# Patient Record
Sex: FEMALE | Race: WHITE | NOT HISPANIC OR LATINO | Employment: OTHER | URBAN - NONMETROPOLITAN AREA
[De-identification: names, ages, dates, MRNs, and addresses within clinical notes are randomized per-mention and may not be internally consistent; named-entity substitution may affect disease eponyms.]

---

## 2017-01-06 ENCOUNTER — HOSPITAL ENCOUNTER (OUTPATIENT)
Dept: GENERAL RADIOLOGY | Facility: HOSPITAL | Age: 72
Discharge: HOME | End: 2017-01-06
Attending: INTERNAL MEDICINE

## 2017-01-06 ENCOUNTER — APPOINTMENT (OUTPATIENT)
Dept: MAMMOGRAPHY | Facility: HOSPITAL | Age: 72
End: 2017-01-06
Attending: INTERNAL MEDICINE

## 2017-01-09 ENCOUNTER — LAB (OUTPATIENT)
Dept: INTERNAL MEDICINE | Facility: CLINIC | Age: 72
End: 2017-01-09

## 2017-01-09 DIAGNOSIS — R73.9 HYPERGLYCEMIA: ICD-10-CM

## 2017-01-09 DIAGNOSIS — E55.9 VITAMIN D DEFICIENCY: ICD-10-CM

## 2017-01-09 DIAGNOSIS — I10 ESSENTIAL HYPERTENSION: ICD-10-CM

## 2017-01-09 DIAGNOSIS — R53.83 OTHER FATIGUE: ICD-10-CM

## 2017-01-09 DIAGNOSIS — E78.5 HYPERLIPIDEMIA, UNSPECIFIED HYPERLIPIDEMIA TYPE: ICD-10-CM

## 2017-01-09 LAB
25(OH)D3 SERPL-MCNC: 28.2 NG/ML
ALBUMIN SERPL-MCNC: 4.3 G/DL (ref 3.2–4.8)
ALBUMIN/GLOB SERPL: 1.7 G/DL (ref 1.5–2.5)
ALP SERPL-CCNC: 78 U/L (ref 25–100)
ALT SERPL W P-5'-P-CCNC: 15 U/L (ref 7–40)
ANION GAP SERPL CALCULATED.3IONS-SCNC: 8 MMOL/L (ref 3–11)
ARTICHOKE IGE QN: 90 MG/DL (ref 0–130)
AST SERPL-CCNC: 23 U/L (ref 0–33)
BACTERIA UR QL AUTO: ABNORMAL /HPF
BASOPHILS # BLD AUTO: 0.04 10*3/MM3 (ref 0–0.2)
BASOPHILS NFR BLD AUTO: 0.5 % (ref 0–1)
BILIRUB SERPL-MCNC: 0.2 MG/DL (ref 0.3–1.2)
BILIRUB UR QL STRIP: NEGATIVE
BUN BLD-MCNC: 22 MG/DL (ref 9–23)
BUN/CREAT SERPL: 31.4 (ref 7–25)
CALCIUM SPEC-SCNC: 9.4 MG/DL (ref 8.7–10.4)
CHLORIDE SERPL-SCNC: 107 MMOL/L (ref 99–109)
CHOLEST SERPL-MCNC: 177 MG/DL (ref 0–200)
CK SERPL-CCNC: 82 U/L (ref 26–174)
CLARITY UR: CLEAR
CO2 SERPL-SCNC: 31 MMOL/L (ref 20–31)
COLOR UR: YELLOW
CREAT BLD-MCNC: 0.7 MG/DL (ref 0.6–1.3)
DEPRECATED RDW RBC AUTO: 49.4 FL (ref 37–54)
EOSINOPHIL # BLD AUTO: 0.16 10*3/MM3 (ref 0.1–0.3)
EOSINOPHIL NFR BLD AUTO: 2 % (ref 0–3)
ERYTHROCYTE [DISTWIDTH] IN BLOOD BY AUTOMATED COUNT: 14.6 % (ref 11.3–14.5)
GFR SERPL CREATININE-BSD FRML MDRD: 83 ML/MIN/1.73
GLOBULIN UR ELPH-MCNC: 2.5 GM/DL
GLUCOSE BLD-MCNC: 101 MG/DL (ref 70–100)
GLUCOSE UR STRIP-MCNC: NEGATIVE MG/DL
HBA1C MFR BLD: 6.2 % (ref 4.8–5.6)
HCT VFR BLD AUTO: 49 % (ref 34.5–44)
HDLC SERPL-MCNC: 66 MG/DL (ref 40–60)
HGB BLD-MCNC: 15.6 G/DL (ref 11.5–15.5)
HGB UR QL STRIP.AUTO: ABNORMAL
HYALINE CASTS UR QL AUTO: ABNORMAL /LPF
IMM GRANULOCYTES # BLD: 0.02 10*3/MM3 (ref 0–0.03)
IMM GRANULOCYTES NFR BLD: 0.3 % (ref 0–0.6)
KETONES UR QL STRIP: NEGATIVE
LEUKOCYTE ESTERASE UR QL STRIP.AUTO: NEGATIVE
LYMPHOCYTES # BLD AUTO: 3.28 10*3/MM3 (ref 0.6–4.8)
LYMPHOCYTES NFR BLD AUTO: 41.3 % (ref 24–44)
MCH RBC QN AUTO: 29.5 PG (ref 27–31)
MCHC RBC AUTO-ENTMCNC: 31.8 G/DL (ref 32–36)
MCV RBC AUTO: 92.6 FL (ref 80–99)
MONOCYTES # BLD AUTO: 0.42 10*3/MM3 (ref 0–1)
MONOCYTES NFR BLD AUTO: 5.3 % (ref 0–12)
NEUTROPHILS # BLD AUTO: 4.02 10*3/MM3 (ref 1.5–8.3)
NEUTROPHILS NFR BLD AUTO: 50.6 % (ref 41–71)
NITRITE UR QL STRIP: NEGATIVE
PH UR STRIP.AUTO: 6 [PH] (ref 5–8)
PLATELET # BLD AUTO: 256 10*3/MM3 (ref 150–450)
PMV BLD AUTO: 11.2 FL (ref 6–12)
POTASSIUM BLD-SCNC: 4.1 MMOL/L (ref 3.5–5.5)
PROT SERPL-MCNC: 6.8 G/DL (ref 5.7–8.2)
PROT UR QL STRIP: ABNORMAL
RBC # BLD AUTO: 5.29 10*6/MM3 (ref 3.89–5.14)
RBC # UR: ABNORMAL /HPF
REF LAB TEST METHOD: ABNORMAL
SODIUM BLD-SCNC: 146 MMOL/L (ref 132–146)
SP GR UR STRIP: 1.02 (ref 1–1.03)
SQUAMOUS #/AREA URNS HPF: ABNORMAL /HPF
TRIGL SERPL-MCNC: 74 MG/DL (ref 0–150)
TSH SERPL DL<=0.05 MIU/L-ACNC: 2.7 MIU/ML (ref 0.35–5.35)
UROBILINOGEN UR QL STRIP: ABNORMAL
WBC NRBC COR # BLD: 7.94 10*3/MM3 (ref 3.5–10.8)
WBC UR QL AUTO: ABNORMAL /HPF

## 2017-01-09 PROCEDURE — 85025 COMPLETE CBC W/AUTO DIFF WBC: CPT | Performed by: INTERNAL MEDICINE

## 2017-01-09 PROCEDURE — 36415 COLL VENOUS BLD VENIPUNCTURE: CPT | Performed by: INTERNAL MEDICINE

## 2017-01-09 PROCEDURE — 82550 ASSAY OF CK (CPK): CPT | Performed by: INTERNAL MEDICINE

## 2017-01-09 PROCEDURE — 80061 LIPID PANEL: CPT | Performed by: INTERNAL MEDICINE

## 2017-01-09 PROCEDURE — 83036 HEMOGLOBIN GLYCOSYLATED A1C: CPT | Performed by: INTERNAL MEDICINE

## 2017-01-09 PROCEDURE — 82306 VITAMIN D 25 HYDROXY: CPT | Performed by: INTERNAL MEDICINE

## 2017-01-09 PROCEDURE — 81001 URINALYSIS AUTO W/SCOPE: CPT | Performed by: INTERNAL MEDICINE

## 2017-01-09 PROCEDURE — 84443 ASSAY THYROID STIM HORMONE: CPT | Performed by: INTERNAL MEDICINE

## 2017-01-09 PROCEDURE — 80053 COMPREHEN METABOLIC PANEL: CPT | Performed by: INTERNAL MEDICINE

## 2017-01-09 RX ORDER — NITROFURANTOIN MACROCRYSTALS 100 MG/1
100 CAPSULE ORAL 2 TIMES DAILY
Qty: 14 CAPSULE | Refills: 0 | Status: SHIPPED | OUTPATIENT
Start: 2017-01-09 | End: 2017-05-15

## 2017-01-16 RX ORDER — CLONAZEPAM 1 MG/1
TABLET ORAL
Qty: 30 TABLET | Refills: 1 | Status: SHIPPED | OUTPATIENT
Start: 2017-01-16 | End: 2017-03-11 | Stop reason: SDUPTHER

## 2017-01-16 RX ORDER — TRAZODONE HYDROCHLORIDE 50 MG/1
TABLET ORAL
Qty: 60 TABLET | Refills: 2 | Status: SHIPPED | OUTPATIENT
Start: 2017-01-16 | End: 2017-04-08 | Stop reason: SDUPTHER

## 2017-01-17 ENCOUNTER — OFFICE VISIT (OUTPATIENT)
Dept: INTERNAL MEDICINE | Facility: CLINIC | Age: 72
End: 2017-01-17

## 2017-01-17 VITALS
RESPIRATION RATE: 14 BRPM | BODY MASS INDEX: 20.83 KG/M2 | SYSTOLIC BLOOD PRESSURE: 100 MMHG | TEMPERATURE: 98.3 F | WEIGHT: 122 LBS | DIASTOLIC BLOOD PRESSURE: 62 MMHG | HEART RATE: 86 BPM | OXYGEN SATURATION: 96 % | HEIGHT: 64 IN

## 2017-01-17 DIAGNOSIS — N39.0 URINARY TRACT INFECTION WITH HEMATURIA, SITE UNSPECIFIED: ICD-10-CM

## 2017-01-17 DIAGNOSIS — J43.9 PULMONARY EMPHYSEMA, UNSPECIFIED EMPHYSEMA TYPE (HCC): ICD-10-CM

## 2017-01-17 DIAGNOSIS — E55.9 VITAMIN D DEFICIENCY: ICD-10-CM

## 2017-01-17 DIAGNOSIS — F41.9 ANXIETY: ICD-10-CM

## 2017-01-17 DIAGNOSIS — Z23 NEED FOR IMMUNIZATION AGAINST INFLUENZA: ICD-10-CM

## 2017-01-17 DIAGNOSIS — R31.9 HEMATURIA: ICD-10-CM

## 2017-01-17 DIAGNOSIS — N64.59 INVERSION OF NIPPLE: ICD-10-CM

## 2017-01-17 DIAGNOSIS — I10 ESSENTIAL HYPERTENSION: ICD-10-CM

## 2017-01-17 DIAGNOSIS — R31.9 URINARY TRACT INFECTION WITH HEMATURIA, SITE UNSPECIFIED: ICD-10-CM

## 2017-01-17 DIAGNOSIS — M79.7 FIBROMYALGIA: ICD-10-CM

## 2017-01-17 DIAGNOSIS — D35.00 ADRENAL ADENOMA, UNSPECIFIED LATERALITY: ICD-10-CM

## 2017-01-17 DIAGNOSIS — E78.5 HYPERLIPIDEMIA, UNSPECIFIED HYPERLIPIDEMIA TYPE: Primary | ICD-10-CM

## 2017-01-17 DIAGNOSIS — M81.0 OSTEOPOROSIS: ICD-10-CM

## 2017-01-17 PROCEDURE — G0008 ADMIN INFLUENZA VIRUS VAC: HCPCS | Performed by: INTERNAL MEDICINE

## 2017-01-17 PROCEDURE — 90662 IIV NO PRSV INCREASED AG IM: CPT | Performed by: INTERNAL MEDICINE

## 2017-01-17 PROCEDURE — 99214 OFFICE O/P EST MOD 30 MIN: CPT | Performed by: INTERNAL MEDICINE

## 2017-01-17 NOTE — MR AVS SNAPSHOT
Gabi Wells Octavia   1/17/2017 2:30 PM   Office Visit    Provider:  Paul Quinteros MD   Department:  [facility] PRIMARY CARE   Dept Phone:  [phone]                Your Full Care Plan              Your Updated Medication List          This list is accurate as of: 1/17/17  3:24 PM.  Always use your most recent med list.                * albuterol 1.25 MG/3ML nebulizer solution   Commonly known as:  ACCUNEB       * albuterol 108 (90 BASE) MCG/ACT inhaler   Commonly known as:  PROVENTIL HFA;VENTOLIN HFA   Inhale 2 puffs every 4 (four) hours as needed for wheezing.       CLARITIN 10 MG tablet   Generic drug:  loratadine       clonazePAM 1 MG tablet   Commonly known as:  KlonoPIN   take 1 tablet by mouth at bedtime if needed       cyanocobalamin 1000 MCG/ML injection       cyclobenzaprine 10 MG tablet   Commonly known as:  FLEXERIL   Take 1 tablet by mouth 3 (Three) Times a Day As Needed for muscle spasms.       DULoxetine 60 MG capsule   Commonly known as:  CYMBALTA   take 1 capsule by mouth once daily       fluticasone 50 MCG/ACT nasal spray   Commonly known as:  FLONASE       HYDROcodone-acetaminophen 7.5-325 MG per tablet   Commonly known as:  NORCO       lovastatin 40 MG tablet   Commonly known as:  MEVACOR   take 1 tablet by mouth at bedtime       nitrofurantoin 100 MG capsule   Commonly known as:  MACRODANTIN   Take 1 capsule by mouth 2 (Two) Times a Day.       pantoprazole 40 MG EC tablet   Commonly known as:  PROTONIX       sertraline 100 MG tablet   Commonly known as:  ZOLOFT   take 1.5 tablets by mouth at bedtime       traZODone 50 MG tablet   Commonly known as:  DESYREL   take 1 to 2 tablets by mouth at bedtime if needed for insomnia       ZOSTAVAX 64781 UNT/0.65ML injection   Generic drug:  zoster vaccine live PF       * Notice:  This list has 2 medication(s) that are the same as other medications prescribed for you. Read the directions carefully, and ask your doctor  "or other care provider to review them with you.            You Were Diagnosed With        Codes Comments    Hyperlipidemia, unspecified hyperlipidemia type    -  Primary ICD-10-CM: E78.5  ICD-9-CM: 272.4     Essential hypertension     ICD-10-CM: I10  ICD-9-CM: 401.9     Pulmonary emphysema, unspecified emphysema type     ICD-10-CM: J43.9  ICD-9-CM: 492.8     Vitamin D deficiency     ICD-10-CM: E55.9  ICD-9-CM: 268.9     Adrenal adenoma, unspecified laterality     ICD-10-CM: D35.00  ICD-9-CM: 227.0     Fibromyalgia     ICD-10-CM: M79.7  ICD-9-CM: 729.1     Osteoporosis     ICD-10-CM: M81.0  ICD-9-CM: 733.00     Inversion of nipple     ICD-10-CM: N64.59  ICD-9-CM: 611.79     Anxiety     ICD-10-CM: F41.9  ICD-9-CM: 300.00     Hematuria     ICD-10-CM: R31.9  ICD-9-CM: 599.70     Urinary tract infection with hematuria, site unspecified     ICD-10-CM: N39.0, R31.9  ICD-9-CM: 599.0       Instructions     None    Patient Instructions History      MyChart Signup     Our records indicate that you have declined [employer] signup. If you would like to sign up for [employer], please email [email or web address] or call [phone] to obtain an activation code.             Other Info from Your Visit           Allergies     No Known Allergies      Reason for Visit     Results Here for follow up on labs       Vital Signs     Blood Pressure Pulse Temperature Respirations Height Weight    100/62 86 98.3 °F (36.8 °C) 14 64\" (162.6 cm) 122 lb (55.3 kg)    Oxygen Saturation Body Mass Index Smoking Status             96% 20.94 kg/m2 Former Smoker         Problems and Diagnoses Noted     Benign tumor of adrenal gland    Anxiety problem    Fibromyalgia    Blood in urine    High cholesterol or triglycerides    High blood pressure    Inverted nipple    Osteoporosis    Emphysema    Urinary tract infection with hematuria    Vitamin D deficiency      No Longer an Issue     Nodular radiologic density      "

## 2017-01-17 NOTE — PROGRESS NOTES
Subjective   Gabi Dudley is a 71 y.o. female.     Chief Complaint   Patient presents with   • Results     Here for follow up on labs        History of Present Illness   still tob.emphysema improved after medications. Anxiety stable on medication, need refill. Insomnia patient really not medication. Hyperlipidemia stable on medication. Hypertension BP normal now, no cp or soa, glucose high, urine dip abnormal.vitD low. Heme +. Fibromyalgia stable on medication.tob smoke again, c/o allergy running nose and zyrtec helps some. Hematuria need urinalysis repeat. Adrenal gland tumor no significant changes, s/p colonoscopy multiple tubular adenoma polyps removed, c/o left nipple inversed since summer, with pain and mass    Current Outpatient Prescriptions:   •  albuterol (ACCUNEB) 1.25 MG/3ML nebulizer solution, Albuterol Sulfate 1.25 MG/3ML Inhalation Nebulization Solution; Patient Sig: Albuterol Sulfate 1.25 MG/3ML Inhalation Nebulization Solution USE 1 UNIT DOSE IN NEBULIZER EVERY 4 TO 6 HOURS AS NEEDED.; 100; 3; 21-Oct-2013; Active, Disp: , Rfl:   •  albuterol (PROVENTIL HFA;VENTOLIN HFA) 108 (90 BASE) MCG/ACT inhaler, Inhale 2 puffs every 4 (four) hours as needed for wheezing., Disp: 1 inhaler, Rfl: 5  •  clonazePAM (KlonoPIN) 1 MG tablet, take 1 tablet by mouth at bedtime if needed, Disp: 30 tablet, Rfl: 1  •  cyanocobalamin 1000 MCG/ML injection, , Disp: , Rfl:   •  cyclobenzaprine (FLEXERIL) 10 MG tablet, Take 1 tablet by mouth 3 (Three) Times a Day As Needed for muscle spasms., Disp: 30 tablet, Rfl: 3  •  DULoxetine (CYMBALTA) 60 MG capsule, take 1 capsule by mouth once daily, Disp: 30 capsule, Rfl: 3  •  fluticasone (FLONASE) 50 MCG/ACT nasal spray, into each nostril daily., Disp: , Rfl:   •  HYDROcodone-acetaminophen (NORCO) 7.5-325 MG per tablet, Take  by mouth., Disp: , Rfl:   •  loratadine (CLARITIN) 10 MG tablet, Take  by mouth., Disp: , Rfl:   •  lovastatin (MEVACOR) 40 MG tablet, take 1 tablet by mouth  at bedtime, Disp: 90 tablet, Rfl: 3  •  nitrofurantoin (MACRODANTIN) 100 MG capsule, Take 1 capsule by mouth 2 (Two) Times a Day., Disp: 14 capsule, Rfl: 0  •  pantoprazole (PROTONIX) 40 MG EC tablet, Take  by mouth., Disp: , Rfl:   •  sertraline (ZOLOFT) 100 MG tablet, take 1.5 tablets by mouth at bedtime, Disp: 45 tablet, Rfl: 5  •  traZODone (DESYREL) 50 MG tablet, take 1 to 2 tablets by mouth at bedtime if needed for insomnia, Disp: 60 tablet, Rfl: 2  •  ZOSTAVAX 41056 UNT/0.65ML injection, , Disp: , Rfl:     The following portions of the patient's history were reviewed and updated as appropriate: allergies, current medications, past family history, past medical history, past social history, past surgical history and problem list.    Review of Systems   Constitutional: Negative.    HENT: Negative.    Eyes: Negative.    Respiratory: Negative.    Cardiovascular: Negative.    Gastrointestinal: Negative.    Endocrine: Negative.    Genitourinary: Negative.    Musculoskeletal: Negative.    Skin: Negative.    Allergic/Immunologic: Negative.    Neurological: Negative.    Hematological: Negative.    Psychiatric/Behavioral: Negative.        Objective   Physical Exam   Constitutional: She is oriented to person, place, and time. She appears well-nourished.   Neck: Neck supple.   Cardiovascular: Normal rate, regular rhythm and normal heart sounds.    Pulmonary/Chest: Effort normal and breath sounds normal.   Abdominal: Bowel sounds are normal.   Neurological: She is alert and oriented to person, place, and time.   Skin: Skin is warm.   Psychiatric: She has a normal mood and affect.       All tests have been reviewed.    Assessment/Plan   There are no diagnoses linked to this encounter.          tob, CT lung 2014, lung nodule, scarring and emphysema, repeat CT lung no change 1/2017  hematuria on dip do micro showed UTI abx  hyperglycemia, do A1c 6.2  vitD low continue vitD3 1000u daily  anxiety continue zoloft 150mg and  klonopin 1mg daily prn , continue cymbalta  CT scan showed liver cyst, large kidney cyst, adrenal gland adenoma, kidney stones non-obstructing. repeat showed no change 1/2017  right knee OA on xray aleve prn knee brace help  hip pain s/p steroid helped improved  Hyperlipidemia stable on medicine continue  Hypertension no more low bp echo normal  Osteoporosis continue medication, refuse dexa   heme+, Colon polyps repeat 6/2019. EGD done  gastritis continue protonix  COPD continue medication. need to Quit tobacco. tudorsa, proair, continue O2 prn  Insomnia continue trazodone 100mg refill  Fibromyalgia stable watch continue medicine and follow up with pain clinic  vacUTD  Tension HA continue flexeril and naproxen  allergy flonase continue  sinus polyp per dentist, obtain rec.   Nipple retraction  mamm and US:negative pathology  tob to quit, RBC high, counselling  follow up in 4 mo

## 2017-03-13 RX ORDER — CLONAZEPAM 1 MG/1
TABLET ORAL
Qty: 30 TABLET | Refills: 1 | Status: SHIPPED | OUTPATIENT
Start: 2017-03-13 | End: 2017-05-13 | Stop reason: SDUPTHER

## 2017-04-10 RX ORDER — SERTRALINE HYDROCHLORIDE 100 MG/1
TABLET, FILM COATED ORAL
Qty: 45 TABLET | Refills: 5 | Status: SHIPPED | OUTPATIENT
Start: 2017-04-10 | End: 2017-09-19 | Stop reason: SDUPTHER

## 2017-04-10 RX ORDER — TRAZODONE HYDROCHLORIDE 50 MG/1
TABLET ORAL
Qty: 60 TABLET | Refills: 2 | Status: SHIPPED | OUTPATIENT
Start: 2017-04-10 | End: 2017-07-06 | Stop reason: SDUPTHER

## 2017-04-10 RX ORDER — DULOXETIN HYDROCHLORIDE 60 MG/1
CAPSULE, DELAYED RELEASE ORAL
Qty: 30 CAPSULE | Refills: 3 | Status: SHIPPED | OUTPATIENT
Start: 2017-04-10 | End: 2017-08-03 | Stop reason: SDUPTHER

## 2017-05-09 ENCOUNTER — TELEPHONE (OUTPATIENT)
Dept: INTERNAL MEDICINE | Facility: CLINIC | Age: 72
End: 2017-05-09

## 2017-05-09 RX ORDER — FLUTICASONE PROPIONATE 50 MCG
1 SPRAY, SUSPENSION (ML) NASAL DAILY
Qty: 1 EACH | Refills: 11 | Status: SHIPPED | OUTPATIENT
Start: 2017-05-09 | End: 2018-02-09 | Stop reason: SDUPTHER

## 2017-05-15 ENCOUNTER — OFFICE VISIT (OUTPATIENT)
Dept: INTERNAL MEDICINE | Facility: CLINIC | Age: 72
End: 2017-05-15

## 2017-05-15 VITALS
BODY MASS INDEX: 21.84 KG/M2 | TEMPERATURE: 97 F | SYSTOLIC BLOOD PRESSURE: 130 MMHG | RESPIRATION RATE: 14 BRPM | HEART RATE: 84 BPM | DIASTOLIC BLOOD PRESSURE: 78 MMHG | OXYGEN SATURATION: 96 % | HEIGHT: 63 IN | WEIGHT: 123.25 LBS

## 2017-05-15 DIAGNOSIS — R31.9 URINARY TRACT INFECTION WITH HEMATURIA, SITE UNSPECIFIED: ICD-10-CM

## 2017-05-15 DIAGNOSIS — M79.7 FIBROMYALGIA: ICD-10-CM

## 2017-05-15 DIAGNOSIS — N39.0 URINARY TRACT INFECTION WITH HEMATURIA, SITE UNSPECIFIED: ICD-10-CM

## 2017-05-15 DIAGNOSIS — I10 ESSENTIAL HYPERTENSION: ICD-10-CM

## 2017-05-15 DIAGNOSIS — N39.0 URINARY TRACT INFECTION, SITE UNSPECIFIED: Primary | ICD-10-CM

## 2017-05-15 DIAGNOSIS — M17.9 OSTEOARTHRITIS OF KNEE, UNSPECIFIED LATERALITY, UNSPECIFIED OSTEOARTHRITIS TYPE: ICD-10-CM

## 2017-05-15 DIAGNOSIS — J43.9 PULMONARY EMPHYSEMA, UNSPECIFIED EMPHYSEMA TYPE (HCC): ICD-10-CM

## 2017-05-15 DIAGNOSIS — M81.0 OSTEOPOROSIS: ICD-10-CM

## 2017-05-15 DIAGNOSIS — E78.5 HYPERLIPIDEMIA, UNSPECIFIED HYPERLIPIDEMIA TYPE: ICD-10-CM

## 2017-05-15 DIAGNOSIS — F41.9 ANXIETY: ICD-10-CM

## 2017-05-15 DIAGNOSIS — E55.9 VITAMIN D DEFICIENCY: ICD-10-CM

## 2017-05-15 LAB
APPEARANCE UR: CLEAR
BACTERIA #/AREA URNS HPF: ABNORMAL /HPF
BILIRUB UR QL STRIP: ABNORMAL
CASTS URNS MICRO: ABNORMAL
COLOR UR: YELLOW
CRYSTALS URNS MICRO: ABNORMAL
EPI CELLS #/AREA URNS HPF: ABNORMAL /HPF
GLUCOSE UR QL: NEGATIVE
HGB UR QL STRIP: NEGATIVE
KETONES UR QL STRIP: NEGATIVE
LEUKOCYTE ESTERASE UR QL STRIP: NEGATIVE
MUCOUS THREADS URNS QL MICRO: ABNORMAL /HPF
NITRITE UR QL STRIP: NEGATIVE
PH UR STRIP: 6 [PH] (ref 5–8)
PROT UR QL STRIP: ABNORMAL
RBC #/AREA URNS HPF: ABNORMAL /HPF
SP GR UR: ABNORMAL (ref 1–1.03)
UROBILINOGEN UR STRIP-MCNC: ABNORMAL MG/DL
WBC #/AREA URNS HPF: ABNORMAL /HPF

## 2017-05-15 PROCEDURE — 99214 OFFICE O/P EST MOD 30 MIN: CPT | Performed by: INTERNAL MEDICINE

## 2017-05-15 RX ORDER — CLONAZEPAM 1 MG/1
TABLET ORAL
Qty: 30 TABLET | Refills: 1 | Status: SHIPPED | OUTPATIENT
Start: 2017-05-15 | End: 2017-07-09 | Stop reason: SDUPTHER

## 2017-06-05 ENCOUNTER — OFFICE VISIT (OUTPATIENT)
Dept: INTERNAL MEDICINE | Facility: CLINIC | Age: 72
End: 2017-06-05

## 2017-06-05 ENCOUNTER — APPOINTMENT (OUTPATIENT)
Dept: CT IMAGING | Facility: HOSPITAL | Age: 72
End: 2017-06-05

## 2017-06-05 ENCOUNTER — HOSPITAL ENCOUNTER (EMERGENCY)
Facility: HOSPITAL | Age: 72
Discharge: HOME | End: 2017-06-05
Attending: EMERGENCY MEDICINE | Admitting: EMERGENCY MEDICINE

## 2017-06-05 VITALS
HEIGHT: 64 IN | TEMPERATURE: 97.9 F | DIASTOLIC BLOOD PRESSURE: 74 MMHG | BODY MASS INDEX: 21 KG/M2 | OXYGEN SATURATION: 93 % | HEART RATE: 91 BPM | WEIGHT: 123 LBS | RESPIRATION RATE: 18 BRPM | SYSTOLIC BLOOD PRESSURE: 94 MMHG

## 2017-06-05 VITALS
SYSTOLIC BLOOD PRESSURE: 80 MMHG | DIASTOLIC BLOOD PRESSURE: 50 MMHG | RESPIRATION RATE: 18 BRPM | BODY MASS INDEX: 21.79 KG/M2 | HEIGHT: 63 IN | WEIGHT: 123 LBS | OXYGEN SATURATION: 92 % | TEMPERATURE: 98.6 F | HEART RATE: 86 BPM

## 2017-06-05 DIAGNOSIS — R10.9 ABDOMINAL PAIN, UNSPECIFIED LOCATION: Primary | ICD-10-CM

## 2017-06-05 DIAGNOSIS — N39.0 URINARY TRACT INFECTION WITHOUT HEMATURIA, SITE UNSPECIFIED: ICD-10-CM

## 2017-06-05 DIAGNOSIS — R11.2 NAUSEA AND VOMITING, INTRACTABILITY OF VOMITING NOT SPECIFIED, UNSPECIFIED VOMITING TYPE: ICD-10-CM

## 2017-06-05 DIAGNOSIS — K52.9 ENTERITIS: Primary | ICD-10-CM

## 2017-06-05 LAB
ALBUMIN SERPL-MCNC: 4.3 G/DL (ref 3.5–5)
ALBUMIN/GLOB SERPL: 1.3 G/DL (ref 1–2)
ALP SERPL-CCNC: 80 U/L (ref 38–126)
ALT SERPL W P-5'-P-CCNC: 18 U/L (ref 13–69)
ANION GAP SERPL CALCULATED.3IONS-SCNC: 14.2 MMOL/L
AST SERPL-CCNC: 26 U/L (ref 15–46)
BACTERIA UR QL AUTO: ABNORMAL /HPF
BASOPHILS # BLD AUTO: 0.03 10*3/MM3 (ref 0–0.2)
BASOPHILS NFR BLD AUTO: 0.3 % (ref 0–2.5)
BILIRUB SERPL-MCNC: 0.5 MG/DL (ref 0.2–1.3)
BILIRUB UR QL STRIP: ABNORMAL
BUN BLD-MCNC: 24 MG/DL (ref 7–20)
BUN/CREAT SERPL: 30 (ref 7.1–23.5)
CALCIUM SPEC-SCNC: 9.3 MG/DL (ref 8.4–10.2)
CHLORIDE SERPL-SCNC: 98 MMOL/L (ref 98–107)
CLARITY UR: ABNORMAL
CO2 SERPL-SCNC: 33 MMOL/L (ref 26–30)
COD CRY URNS QL: ABNORMAL /HPF
COLOR UR: ABNORMAL
CREAT BLD-MCNC: 0.8 MG/DL (ref 0.6–1.3)
DEPRECATED RDW RBC AUTO: 46.5 FL (ref 37–54)
EOSINOPHIL # BLD AUTO: 0.01 10*3/MM3 (ref 0–0.7)
EOSINOPHIL NFR BLD AUTO: 0.1 % (ref 0–7)
ERYTHROCYTE [DISTWIDTH] IN BLOOD BY AUTOMATED COUNT: 13.9 % (ref 11.5–14.5)
GFR SERPL CREATININE-BSD FRML MDRD: 71 ML/MIN/1.73
GLOBULIN UR ELPH-MCNC: 3.2 GM/DL
GLUCOSE BLD-MCNC: 116 MG/DL (ref 74–98)
GLUCOSE UR STRIP-MCNC: NEGATIVE MG/DL
HCT VFR BLD AUTO: 50.1 % (ref 37–47)
HGB BLD-MCNC: 16.1 G/DL (ref 12–16)
HGB UR QL STRIP.AUTO: ABNORMAL
HOLD SPECIMEN: NORMAL
HOLD SPECIMEN: NORMAL
HYALINE CASTS UR QL AUTO: ABNORMAL /LPF
IMM GRANULOCYTES # BLD: 0.04 10*3/MM3 (ref 0–0.06)
IMM GRANULOCYTES NFR BLD: 0.4 % (ref 0–0.6)
KETONES UR QL STRIP: NEGATIVE
LEUKOCYTE ESTERASE UR QL STRIP.AUTO: ABNORMAL
LIPASE SERPL-CCNC: 61 U/L (ref 23–300)
LYMPHOCYTES # BLD AUTO: 2.38 10*3/MM3 (ref 0.6–3.4)
LYMPHOCYTES NFR BLD AUTO: 20.9 % (ref 10–50)
MCH RBC QN AUTO: 29.2 PG (ref 27–31)
MCHC RBC AUTO-ENTMCNC: 32.1 G/DL (ref 30–37)
MCV RBC AUTO: 90.9 FL (ref 81–99)
MONOCYTES # BLD AUTO: 0.61 10*3/MM3 (ref 0–0.9)
MONOCYTES NFR BLD AUTO: 5.4 % (ref 0–12)
MUCOUS THREADS URNS QL MICRO: ABNORMAL /HPF
NEUTROPHILS # BLD AUTO: 8.31 10*3/MM3 (ref 2–6.9)
NEUTROPHILS NFR BLD AUTO: 72.9 % (ref 37–80)
NITRITE UR QL STRIP: NEGATIVE
NRBC BLD MANUAL-RTO: 0 /100 WBC (ref 0–0)
PH UR STRIP.AUTO: 6 [PH] (ref 5–8)
PLATELET # BLD AUTO: 246 10*3/MM3 (ref 130–400)
PMV BLD AUTO: 10.4 FL (ref 6–12)
POTASSIUM BLD-SCNC: 3.2 MMOL/L (ref 3.5–5.1)
PROT SERPL-MCNC: 7.5 G/DL (ref 6.3–8.2)
PROT UR QL STRIP: ABNORMAL
RBC # BLD AUTO: 5.51 10*6/MM3 (ref 4.2–5.4)
RBC # UR: ABNORMAL /HPF
REF LAB TEST METHOD: ABNORMAL
SODIUM BLD-SCNC: 142 MMOL/L (ref 137–145)
SP GR UR STRIP: 1.02 (ref 1–1.03)
SQUAMOUS #/AREA URNS HPF: ABNORMAL /HPF
TRANS CELLS #/AREA URNS HPF: ABNORMAL /HPF
UROBILINOGEN UR QL STRIP: ABNORMAL
WBC NRBC COR # BLD: 11.38 10*3/MM3 (ref 4.8–10.8)
WBC UR QL AUTO: ABNORMAL /HPF
WHOLE BLOOD HOLD SPECIMEN: NORMAL
WHOLE BLOOD HOLD SPECIMEN: NORMAL
YEAST URNS QL MICRO: ABNORMAL /HPF

## 2017-06-05 PROCEDURE — 0 IOPAMIDOL 61 % SOLUTION: Performed by: EMERGENCY MEDICINE

## 2017-06-05 PROCEDURE — 99213 OFFICE O/P EST LOW 20 MIN: CPT | Performed by: INTERNAL MEDICINE

## 2017-06-05 PROCEDURE — 96376 TX/PRO/DX INJ SAME DRUG ADON: CPT

## 2017-06-05 PROCEDURE — 25010000002 MORPHINE PER 10 MG: Performed by: EMERGENCY MEDICINE

## 2017-06-05 PROCEDURE — 99283 EMERGENCY DEPT VISIT LOW MDM: CPT

## 2017-06-05 PROCEDURE — 85025 COMPLETE CBC W/AUTO DIFF WBC: CPT | Performed by: EMERGENCY MEDICINE

## 2017-06-05 PROCEDURE — 80053 COMPREHEN METABOLIC PANEL: CPT | Performed by: EMERGENCY MEDICINE

## 2017-06-05 PROCEDURE — 74177 CT ABD & PELVIS W/CONTRAST: CPT

## 2017-06-05 PROCEDURE — 25010000002 ONDANSETRON PER 1 MG: Performed by: EMERGENCY MEDICINE

## 2017-06-05 PROCEDURE — 96374 THER/PROPH/DIAG INJ IV PUSH: CPT

## 2017-06-05 PROCEDURE — 81001 URINALYSIS AUTO W/SCOPE: CPT | Performed by: EMERGENCY MEDICINE

## 2017-06-05 PROCEDURE — 96375 TX/PRO/DX INJ NEW DRUG ADDON: CPT

## 2017-06-05 PROCEDURE — 83690 ASSAY OF LIPASE: CPT | Performed by: EMERGENCY MEDICINE

## 2017-06-05 PROCEDURE — 96361 HYDRATE IV INFUSION ADD-ON: CPT

## 2017-06-05 PROCEDURE — 87086 URINE CULTURE/COLONY COUNT: CPT | Performed by: EMERGENCY MEDICINE

## 2017-06-05 RX ORDER — METRONIDAZOLE 500 MG/1
500 TABLET ORAL 3 TIMES DAILY
Qty: 21 TABLET | Refills: 0 | Status: SHIPPED | OUTPATIENT
Start: 2017-06-05 | End: 2017-06-12

## 2017-06-05 RX ORDER — MORPHINE SULFATE 4 MG/ML
4 INJECTION, SOLUTION INTRAMUSCULAR; INTRAVENOUS ONCE
Status: COMPLETED | OUTPATIENT
Start: 2017-06-05 | End: 2017-06-05

## 2017-06-05 RX ORDER — SODIUM CHLORIDE 0.9 % (FLUSH) 0.9 %
10 SYRINGE (ML) INJECTION AS NEEDED
Status: DISCONTINUED | OUTPATIENT
Start: 2017-06-05 | End: 2017-06-05 | Stop reason: HOSPADM

## 2017-06-05 RX ORDER — PROMETHAZINE HYDROCHLORIDE 25 MG/1
25 TABLET ORAL EVERY 6 HOURS PRN
Qty: 20 TABLET | Refills: 0 | Status: SHIPPED | OUTPATIENT
Start: 2017-06-05 | End: 2018-01-04

## 2017-06-05 RX ORDER — ONDANSETRON 2 MG/ML
4 INJECTION INTRAMUSCULAR; INTRAVENOUS ONCE
Status: COMPLETED | OUTPATIENT
Start: 2017-06-05 | End: 2017-06-05

## 2017-06-05 RX ORDER — PROMETHAZINE HYDROCHLORIDE 25 MG/1
25 TABLET ORAL EVERY 6 HOURS PRN
Qty: 30 TABLET | Refills: 1 | Status: SHIPPED | OUTPATIENT
Start: 2017-06-05 | End: 2017-12-26

## 2017-06-05 RX ORDER — CIPROFLOXACIN 500 MG/1
500 TABLET, FILM COATED ORAL 2 TIMES DAILY
Qty: 14 TABLET | Refills: 0 | Status: SHIPPED | OUTPATIENT
Start: 2017-06-05 | End: 2017-06-12

## 2017-06-05 RX ADMIN — IOPAMIDOL 99 ML: 612 INJECTION, SOLUTION INTRAVENOUS at 18:30

## 2017-06-05 RX ADMIN — MORPHINE SULFATE 4 MG: 4 INJECTION, SOLUTION INTRAMUSCULAR; INTRAVENOUS at 19:10

## 2017-06-05 RX ADMIN — MORPHINE SULFATE 4 MG: 4 INJECTION, SOLUTION INTRAMUSCULAR; INTRAVENOUS at 18:14

## 2017-06-05 RX ADMIN — SODIUM CHLORIDE 1000 ML: 9 INJECTION, SOLUTION INTRAVENOUS at 18:14

## 2017-06-05 RX ADMIN — ONDANSETRON 4 MG: 2 INJECTION INTRAMUSCULAR; INTRAVENOUS at 19:10

## 2017-06-05 RX ADMIN — ONDANSETRON 4 MG: 2 INJECTION INTRAMUSCULAR; INTRAVENOUS at 18:14

## 2017-06-05 NOTE — PROGRESS NOTES
Subjective   Gabi Dudley is a 72 y.o. female.     Chief Complaint   Patient presents with   • Vomiting     severe projectile - since yesterday    • Nausea   • Abdominal Pain     severe       History of Present Illness   Patient started to have abdominal pain right lower quadrant severe cramping in nature tenderness fever nausea projectile vomiting.  No diarrhea or constipation pain radiated to the back.  Denies any chill.  No appetite.    Current Outpatient Prescriptions:   •  albuterol (ACCUNEB) 1.25 MG/3ML nebulizer solution, Albuterol Sulfate 1.25 MG/3ML Inhalation Nebulization Solution; Patient Sig: Albuterol Sulfate 1.25 MG/3ML Inhalation Nebulization Solution USE 1 UNIT DOSE IN NEBULIZER EVERY 4 TO 6 HOURS AS NEEDED.; 100; 3; 21-Oct-2013; Active, Disp: , Rfl:   •  albuterol (ACCUNEB) 1.25 MG/3ML nebulizer solution, Take 3 mL by nebulization Every 6 (Six) Hours As Needed for Wheezing., Disp: 30 vial, Rfl: 3  •  albuterol (PROVENTIL HFA;VENTOLIN HFA) 108 (90 BASE) MCG/ACT inhaler, Inhale 2 puffs every 4 (four) hours as needed for wheezing., Disp: 1 inhaler, Rfl: 5  •  clonazePAM (KlonoPIN) 1 MG tablet, take 1 tablet by mouth at bedtime if needed, Disp: 30 tablet, Rfl: 1  •  cyclobenzaprine (FLEXERIL) 10 MG tablet, Take 1 tablet by mouth 3 (Three) Times a Day As Needed for muscle spasms., Disp: 30 tablet, Rfl: 3  •  DULoxetine (CYMBALTA) 60 MG capsule, take 1 capsule by mouth once daily, Disp: 30 capsule, Rfl: 3  •  fluticasone (FLONASE) 50 MCG/ACT nasal spray, 1 spray into each nostril Daily., Disp: 1 each, Rfl: 11  •  HYDROcodone-acetaminophen (NORCO) 7.5-325 MG per tablet, Take  by mouth 3 (Three) Times a Day., Disp: , Rfl:   •  loratadine (CLARITIN) 10 MG tablet, Take  by mouth., Disp: , Rfl:   •  lovastatin (MEVACOR) 40 MG tablet, take 1 tablet by mouth at bedtime, Disp: 90 tablet, Rfl: 3  •  pantoprazole (PROTONIX) 40 MG EC tablet, Take  by mouth As Needed., Disp: , Rfl:   •  sertraline (ZOLOFT) 100 MG  tablet, take 1.5 tablets by mouth at bedtime, Disp: 45 tablet, Rfl: 5  •  traZODone (DESYREL) 50 MG tablet, take 1 to 2 tablets by mouth at bedtime if needed for insomnia, Disp: 60 tablet, Rfl: 2  •  ZOSTAVAX [id number] UNT/0.65ML injection, , Disp: , Rfl:     The following portions of the patient's history were reviewed and updated as appropriate: allergies, current medications, past family history, past medical history, past social history, past surgical history and problem list.    Review of Systems   Constitutional: Negative.    Respiratory: Negative.    Cardiovascular: Negative.    Gastrointestinal: Positive for abdominal distention, abdominal pain and nausea. Negative for constipation and diarrhea.   Musculoskeletal: Negative.    Skin: Negative.    Neurological: Negative.    Psychiatric/Behavioral: Negative.        Objective   Physical Exam   Constitutional: She is oriented to person, place, and time. She appears well-nourished.   Neck: Neck supple.   Cardiovascular: Normal rate, regular rhythm and normal heart sounds.    Pulmonary/Chest: Effort normal and breath sounds normal.   Abdominal: Soft. Bowel sounds are normal. She exhibits no mass. There is tenderness. There is rebound. There is no guarding. No hernia.   Neurological: She is alert and oriented to person, place, and time.   Skin: Skin is warm.   Psychiatric: She has a normal mood and affect.       All tests have been reviewed.    Assessment/Plan   Diagnoses and all orders for this visit:    Abdominal pain, unspecified location            Abdominal pain with nausea vomiting tenderness rebound fever.  Referred to emergency room.[email or web address]

## 2017-06-05 NOTE — ED PROVIDER NOTES
Subjective   HPI Comments: 72-year-old female presenting with abdominal pain.  She states that yesterday she had severe diffuse lower abdominal pain, she had a subjective fever, went to sleep and the pain subsided.  Today the pain has been localized more to the right lower quadrant.  She's had multiple episodes of nausea and nonbloody/nonbilious vomiting.  She denies any diarrhea, constipation, urinary symptoms, chest pain, shortness of breath.  She states that several years ago she had pain similar to this, was admitted to the hospital and had an extensive workup and no diagnosis was made.      Review of Systems   Constitutional: Negative for chills and fever.   HENT: Negative for congestion, rhinorrhea and sore throat.    Eyes: Negative for pain.   Respiratory: Negative for cough and shortness of breath.    Cardiovascular: Negative for chest pain, palpitations and leg swelling.   Gastrointestinal: Positive for abdominal pain, nausea and vomiting. Negative for diarrhea.   Genitourinary: Negative for dysuria.   Musculoskeletal: Negative for arthralgias.   Skin: Negative for rash.   Neurological: Negative for weakness and numbness.   Psychiatric/Behavioral: Negative for behavioral problems.       Past Medical History:   Diagnosis Date   • Adrenal adenoma    • Anemia    • Anxiety    • Asthma    • Back pain    • Benign colonic polyp    • COPD (chronic obstructive pulmonary disease) 2005   • Depression    • Fibromyalgia    • Gastritis    • Generalized anxiety disorder    • Hemorrhoids    • History of blood transfusion    • History of endometriosis    • History of osteoarthritis    • Hypertension    • Kidney stone    • Liver cyst    • Nodular radiologic density    • Occult GI bleeding     · Last Impression: 03 Mar 2015  Likely secondary to erosive gastritis.  Additionally the   patient has history of vascular ectasias within the colon in the past.  There is no   history of anemia in the laboratory tests available.   Goyo Nunez (Gastroenterology)    • Osteoarthritis    • Renal cyst    • Sinus problem     2014   • Sinusitis    • Skin cancer     basal cell carcinoma   • Tobacco use    • Vitamin D deficiency    • Weight loss      · Last Impression: 03 Mar 2015  Improved.  Goyo Nunez (Gastroenterology)       No Known Allergies    Past Surgical History:   Procedure Laterality Date   • APPENDECTOMY  1980s   • CATARACT EXTRACTION  2013    both eyes   • COLONOSCOPY  2013    Dr Nunez, tubular adenoma   • COLONOSCOPY W/ BIOPSIES AND POLYPECTOMY     • HYSTERECTOMY  1980s    partial   • TONSILLECTOMY  1987   • UPPER GASTROINTESTINAL ENDOSCOPY  2015       Family History   Problem Relation Age of Onset   • Stomach cancer Brother    • Colon cancer Maternal Aunt    • No Known Problems Mother    • No Known Problems Father    • No Known Problems Sister    • No Known Problems Maternal Uncle    • No Known Problems Paternal Aunt    • No Known Problems Paternal Uncle    • No Known Problems Maternal Grandmother    • No Known Problems Maternal Grandfather    • No Known Problems Paternal Grandmother    • Lung cancer Paternal Grandfather    • Celiac disease Neg Hx    • Cirrhosis Neg Hx    • Colon polyps Neg Hx    • Crohn's disease Neg Hx    • Cystic fibrosis Neg Hx    • Esophageal cancer Neg Hx    • Hemochromatosis Neg Hx    • Inflammatory bowel disease Neg Hx    • Irritable bowel syndrome Neg Hx    • Liver cancer Neg Hx    • Liver disease Neg Hx    • Rectal cancer Neg Hx    • Ulcerative colitis Neg Hx    • Esdras's disease Neg Hx    • Alcohol abuse Neg Hx    • Pancreatitis Neg Hx        Social History     Social History   • Marital status:      Spouse name: N/A   • Number of children: N/A   • Years of education: N/A     Social History Main Topics   • Smoking status: Former Smoker     Types: Cigarettes   • Smokeless tobacco: Never Used      Comment: using nicotin patches   • Alcohol use No      Comment: glass of wine once/twice a year   •  Drug use: No   • Sexual activity: Defer     Other Topics Concern   • None     Social History Narrative           Objective   Physical Exam   Constitutional: She is oriented to person, place, and time. She appears well-developed and well-nourished. No distress.   HENT:   Head: Normocephalic and atraumatic.   Right Ear: External ear normal.   Left Ear: External ear normal.   Nose: Nose normal.   Mouth/Throat: Oropharynx is clear and moist.   Eyes: Conjunctivae and EOM are normal. Pupils are equal, round, and reactive to light.   Neck: Normal range of motion. Neck supple.   Cardiovascular: Normal rate, regular rhythm, normal heart sounds and intact distal pulses.    Pulmonary/Chest: Effort normal and breath sounds normal. No respiratory distress.   Abdominal: Soft. Bowel sounds are normal. She exhibits no distension. There is no rebound and no guarding.   Minimal diffuse tenderness, no rebound or guarding   Musculoskeletal: Normal range of motion. She exhibits no edema, tenderness or deformity.   Neurological: She is alert and oriented to person, place, and time.   Skin: Skin is warm and dry. No rash noted.   Psychiatric: She has a normal mood and affect. Her behavior is normal.   Nursing note and vitals reviewed.      Procedures         ED Course  ED Course                  MDM  Number of Diagnoses or Management Options  Enteritis:   Nausea and vomiting, intractability of vomiting not specified, unspecified vomiting type:   Urinary tract infection without hematuria, site unspecified:   Diagnosis management comments: 72-year-old female with abdominal pain, nausea/vomiting.  Well-developed, well-nourished elderly female in no distress with normal vital signs here and exam notable for lower abdominal tenderness.  Given her age and multiple surgeries in the past concern for obstruction versus other intra-abdominal pathology.  We'll treat symptoms and check labs, CT scan.  Disposition pending  workup.  -labs  -ua  -ct  -ivf  -iv meds    Ddx: obstruction, colitis, diverticulitis, stone, uti    Here is unremarkable other than a mild leukocytosis.  UA does have some signs of infection.  CT scan shows findings consistent with an enteritis.  No other acute findings.  She is feeling better.  She has remained stable throughout her stay here.  We'll cover with Cipro/Flagyl.  Very strict return precautions discussed.  She is comfortable with and understanding of the plan.      Final diagnoses:   Enteritis   Urinary tract infection without hematuria, site unspecified   Nausea and vomiting, intractability of vomiting not specified, unspecified vomiting type            Alberto Cox MD  06/05/17 2357

## 2017-06-07 LAB — BACTERIA SPEC AEROBE CULT: NO GROWTH

## 2017-07-06 RX ORDER — TRAZODONE HYDROCHLORIDE 50 MG/1
TABLET ORAL
Qty: 60 TABLET | Refills: 2 | Status: SHIPPED | OUTPATIENT
Start: 2017-07-06 | End: 2017-09-29 | Stop reason: SDUPTHER

## 2017-07-10 RX ORDER — CLONAZEPAM 1 MG/1
TABLET ORAL
Qty: 30 TABLET | Refills: 1 | Status: SHIPPED | OUTPATIENT
Start: 2017-07-10 | End: 2017-09-11 | Stop reason: SDUPTHER

## 2017-08-03 RX ORDER — DULOXETIN HYDROCHLORIDE 60 MG/1
CAPSULE, DELAYED RELEASE ORAL
Qty: 30 CAPSULE | Refills: 3 | Status: SHIPPED | OUTPATIENT
Start: 2017-08-03 | End: 2017-11-27 | Stop reason: SDUPTHER

## 2017-08-09 RX ORDER — LOVASTATIN 40 MG/1
TABLET ORAL
Qty: 90 TABLET | Refills: 3 | Status: SHIPPED | OUTPATIENT
Start: 2017-08-09 | End: 2018-03-16 | Stop reason: ALTCHOICE

## 2017-09-11 RX ORDER — CLONAZEPAM 1 MG/1
TABLET ORAL
Qty: 30 TABLET | Refills: 3 | Status: SHIPPED | OUTPATIENT
Start: 2017-09-11 | End: 2017-09-19 | Stop reason: SDUPTHER

## 2017-09-19 ENCOUNTER — OFFICE VISIT (OUTPATIENT)
Dept: INTERNAL MEDICINE | Facility: CLINIC | Age: 72
End: 2017-09-19

## 2017-09-19 VITALS
HEART RATE: 82 BPM | TEMPERATURE: 97.8 F | RESPIRATION RATE: 14 BRPM | DIASTOLIC BLOOD PRESSURE: 68 MMHG | HEIGHT: 64 IN | SYSTOLIC BLOOD PRESSURE: 106 MMHG | OXYGEN SATURATION: 94 % | WEIGHT: 120 LBS | BODY MASS INDEX: 20.49 KG/M2

## 2017-09-19 DIAGNOSIS — G44.209 TENSION HEADACHE: ICD-10-CM

## 2017-09-19 DIAGNOSIS — F41.9 ANXIETY: ICD-10-CM

## 2017-09-19 DIAGNOSIS — R31.9 HEMATURIA: ICD-10-CM

## 2017-09-19 DIAGNOSIS — E55.9 VITAMIN D DEFICIENCY: ICD-10-CM

## 2017-09-19 DIAGNOSIS — E78.5 HYPERLIPIDEMIA, UNSPECIFIED HYPERLIPIDEMIA TYPE: Primary | ICD-10-CM

## 2017-09-19 DIAGNOSIS — J43.9 PULMONARY EMPHYSEMA, UNSPECIFIED EMPHYSEMA TYPE (HCC): ICD-10-CM

## 2017-09-19 DIAGNOSIS — I10 ESSENTIAL HYPERTENSION: ICD-10-CM

## 2017-09-19 DIAGNOSIS — N39.0 URINARY TRACT INFECTION WITH HEMATURIA, SITE UNSPECIFIED: ICD-10-CM

## 2017-09-19 DIAGNOSIS — R31.9 URINARY TRACT INFECTION WITH HEMATURIA, SITE UNSPECIFIED: ICD-10-CM

## 2017-09-19 DIAGNOSIS — M17.9 OSTEOARTHRITIS OF KNEE, UNSPECIFIED LATERALITY, UNSPECIFIED OSTEOARTHRITIS TYPE: ICD-10-CM

## 2017-09-19 DIAGNOSIS — M79.7 FIBROMYALGIA: ICD-10-CM

## 2017-09-19 PROBLEM — R10.9 ABDOMINAL PAIN: Status: RESOLVED | Noted: 2017-06-05 | Resolved: 2017-09-19

## 2017-09-19 PROCEDURE — 99214 OFFICE O/P EST MOD 30 MIN: CPT | Performed by: INTERNAL MEDICINE

## 2017-09-19 RX ORDER — CLONAZEPAM 1 MG/1
1 TABLET ORAL 2 TIMES DAILY PRN
Qty: 30 TABLET | Refills: 3 | Status: SHIPPED | OUTPATIENT
Start: 2017-09-19 | End: 2018-02-09 | Stop reason: SDUPTHER

## 2017-09-19 RX ORDER — SERTRALINE HYDROCHLORIDE 100 MG/1
100 TABLET, FILM COATED ORAL DAILY
Qty: 30 TABLET | Refills: 5 | Status: SHIPPED | OUTPATIENT
Start: 2017-09-19 | End: 2018-02-09 | Stop reason: SDUPTHER

## 2017-09-19 NOTE — PROGRESS NOTES
Subjective   Gabi Dudley is a 72 y.o. female.     Chief Complaint   Patient presents with   • Hypertension     here for follow up    • Med Refill     clonazapam        History of Present Illness   Patient here for follow-up.  Abdominal pain resolved after antibiotics for enteritis.  Mild dysuria history of hematuria need to repeat.  Anxiety stable medication patient needs refill of clonazepam.  Hypertension stable medication.  Hyperlipidemia stable medication.  COPD stable on medication.  Insomnia stable medication.  Headache is stable medication.  Vitamin D low on supplement stable.    Current Outpatient Prescriptions:   •  albuterol (ACCUNEB) 1.25 MG/3ML nebulizer solution, Take 3 mL by nebulization Every 6 (Six) Hours As Needed for Wheezing., Disp: 30 vial, Rfl: 3  •  albuterol (PROVENTIL HFA;VENTOLIN HFA) 108 (90 BASE) MCG/ACT inhaler, Inhale 2 puffs every 4 (four) hours as needed for wheezing., Disp: 1 inhaler, Rfl: 5  •  clonazePAM (KlonoPIN) 1 MG tablet, take 1 tablet by mouth at bedtime, Disp: 30 tablet, Rfl: 3  •  cyclobenzaprine (FLEXERIL) 10 MG tablet, Take 1 tablet by mouth 3 (Three) Times a Day As Needed for muscle spasms., Disp: 30 tablet, Rfl: 3  •  DULoxetine (CYMBALTA) 60 MG capsule, take 1 capsule by mouth once daily, Disp: 30 capsule, Rfl: 3  •  fluticasone (FLONASE) 50 MCG/ACT nasal spray, 1 spray into each nostril Daily., Disp: 1 each, Rfl: 11  •  HYDROcodone-acetaminophen (NORCO) 7.5-325 MG per tablet, Take  by mouth 3 (Three) Times a Day., Disp: , Rfl:   •  loratadine (CLARITIN) 10 MG tablet, Take  by mouth., Disp: , Rfl:   •  lovastatin (MEVACOR) 40 MG tablet, take 1 tablet by mouth at bedtime, Disp: 90 tablet, Rfl: 3  •  pantoprazole (PROTONIX) 40 MG EC tablet, Take  by mouth As Needed., Disp: , Rfl:   •  promethazine (PHENERGAN) 25 MG tablet, Take 1 tablet by mouth Every 6 (Six) Hours As Needed for Nausea or Vomiting., Disp: 30 tablet, Rfl: 1  •  promethazine (PHENERGAN) 25 MG tablet,  Take 1 tablet by mouth Every 6 (Six) Hours As Needed for Nausea or Vomiting., Disp: 20 tablet, Rfl: 0  •  sertraline (ZOLOFT) 100 MG tablet, take 1.5 tablets by mouth at bedtime, Disp: 45 tablet, Rfl: 5  •  traZODone (DESYREL) 50 MG tablet, take 1 to 2 tablets by mouth at bedtime if needed for insomnia, Disp: 60 tablet, Rfl: 2  •  ZOSTAVAX [id number] UNT/0.65ML injection, , Disp: , Rfl:     The following portions of the patient's history were reviewed and updated as appropriate: allergies, current medications, past family history, past medical history, past social history, past surgical history and problem list.    Review of Systems   Constitutional: Negative.    HENT: Positive for sore throat.    Respiratory: Positive for cough.    Cardiovascular: Negative.    Gastrointestinal: Negative.    Musculoskeletal: Positive for arthralgias and myalgias.   Skin: Negative.    Neurological: Negative.    Psychiatric/Behavioral: Positive for agitation.       Objective   Physical Exam   Constitutional: She is oriented to person, place, and time. She appears well-developed and well-nourished.   HENT:   Head: Normocephalic and atraumatic.   Eyes: Conjunctivae are normal. Pupils are equal, round, and reactive to light.   Neck: Normal range of motion. Neck supple.   Cardiovascular: Normal rate, regular rhythm and normal heart sounds.    Pulmonary/Chest: Effort normal and breath sounds normal.   Abdominal: Bowel sounds are normal.   Musculoskeletal: She exhibits tenderness.   Neurological: She is alert and oriented to person, place, and time.   Skin: Skin is warm.   Psychiatric: She has a normal mood and affect.       All tests have been reviewed.    Assessment/Plan   There are no diagnoses linked to this encounter.          tob, CT lung 2014, lung nodule, scarring and emphysema, repeat CT lung no change 1/2017  hematuria on dip and recent dysuria do micro--  hyperglycemia, A1c 6.2 continue diet  vitD low continue vitD3 1000u  daily  anxiety continue zoloft 150mg change to 100mg  and klonopin 1mg daily prn , continue cymbalta 60, refill klonopin  URI start tylenol and mucinex and zyrtec start--  CT scan showed liver cyst, large kidney cyst, adrenal gland adenoma, kidney stones non-obstructing. repeat showed no change 1/2017  right knee OA on xray aleve prn knee brace help  hip pain s/p steroid helped improved  Hyperlipidemia stable on medicine continue  Hypertension no more low bp echo normal  Osteoporosis continue medication, refuse dexa   heme+, Colon polyps repeat 6/2019. EGD done  gastritis continue protonix  COPD continue medication. need to Quit tobacco. tudorsa, proair, continue O2 prn  Insomnia continue trazodone 100mg refill  Fibromyalgia stable watch continue medicine and follow up with pain clinic,with lortab, continue followup with pain clinic  VacUTD, pneumovax again 9/14/17, in pharmacy  Tension HA continue flexeril and naproxen  allergy flonase continue  sinus polyp per dentist, obtain rec.    Nipple retraction mamm and US:negative pathology  tob to quit, RBC high, recheck. counselling  follow up in 3 mo

## 2017-09-29 RX ORDER — TRAZODONE HYDROCHLORIDE 50 MG/1
TABLET ORAL
Qty: 60 TABLET | Refills: 2 | Status: SHIPPED | OUTPATIENT
Start: 2017-09-29 | End: 2017-12-27 | Stop reason: SDUPTHER

## 2017-11-20 ENCOUNTER — APPOINTMENT (OUTPATIENT)
Dept: ULTRASOUND IMAGING | Facility: HOSPITAL | Age: 72
End: 2017-11-20

## 2017-11-20 ENCOUNTER — HOSPITAL ENCOUNTER (OUTPATIENT)
Dept: ULTRASOUND IMAGING | Facility: HOSPITAL | Age: 72
Discharge: HOME | End: 2017-11-20
Admitting: NURSE PRACTITIONER

## 2017-11-20 DIAGNOSIS — R11.2 NAUSEA AND VOMITING, INTRACTABILITY OF VOMITING NOT SPECIFIED, UNSPECIFIED VOMITING TYPE: ICD-10-CM

## 2017-11-20 DIAGNOSIS — R10.11 RIGHT UPPER QUADRANT ABDOMINAL PAIN: ICD-10-CM

## 2017-11-20 DIAGNOSIS — K80.50 BILIARY PAIN: ICD-10-CM

## 2017-11-20 PROCEDURE — 76705 ECHO EXAM OF ABDOMEN: CPT

## 2017-11-21 ENCOUNTER — APPOINTMENT (OUTPATIENT)
Dept: CT IMAGING | Facility: HOSPITAL | Age: 72
End: 2017-11-21

## 2017-11-21 ENCOUNTER — HOSPITAL ENCOUNTER (EMERGENCY)
Facility: HOSPITAL | Age: 72
Discharge: HOME | End: 2017-11-21
Attending: STUDENT IN AN ORGANIZED HEALTH CARE EDUCATION/TRAINING PROGRAM | Admitting: STUDENT IN AN ORGANIZED HEALTH CARE EDUCATION/TRAINING PROGRAM

## 2017-11-21 VITALS
WEIGHT: 120 LBS | OXYGEN SATURATION: 94 % | BODY MASS INDEX: 19.99 KG/M2 | RESPIRATION RATE: 18 BRPM | SYSTOLIC BLOOD PRESSURE: 151 MMHG | HEART RATE: 78 BPM | TEMPERATURE: 97.9 F | HEIGHT: 65 IN | DIASTOLIC BLOOD PRESSURE: 87 MMHG

## 2017-11-21 DIAGNOSIS — D35.02 ADRENAL ADENOMA, LEFT: ICD-10-CM

## 2017-11-21 DIAGNOSIS — R10.84 DIFFUSE ABDOMINAL PAIN: Primary | ICD-10-CM

## 2017-11-21 DIAGNOSIS — K29.00 ACUTE GASTRITIS WITHOUT HEMORRHAGE, UNSPECIFIED GASTRITIS TYPE: ICD-10-CM

## 2017-11-21 LAB
ALBUMIN SERPL-MCNC: 3.6 G/DL (ref 3.5–5)
ALBUMIN/GLOB SERPL: 1.4 G/DL (ref 1–2)
ALP SERPL-CCNC: 60 U/L (ref 38–126)
ALT SERPL W P-5'-P-CCNC: 28 U/L (ref 13–69)
ANION GAP SERPL CALCULATED.3IONS-SCNC: 11 MMOL/L
AST SERPL-CCNC: 22 U/L (ref 15–46)
BASOPHILS # BLD AUTO: 0.05 10*3/MM3 (ref 0–0.2)
BASOPHILS NFR BLD AUTO: 0.6 % (ref 0–2.5)
BILIRUB SERPL-MCNC: 0.3 MG/DL (ref 0.2–1.3)
BILIRUB UR QL STRIP: NEGATIVE
BUN BLD-MCNC: 15 MG/DL (ref 7–20)
BUN/CREAT SERPL: 25 (ref 7.1–23.5)
CALCIUM SPEC-SCNC: 8.8 MG/DL (ref 8.4–10.2)
CHLORIDE SERPL-SCNC: 101 MMOL/L (ref 98–107)
CLARITY UR: CLEAR
CO2 SERPL-SCNC: 33 MMOL/L (ref 26–30)
COLOR UR: YELLOW
CREAT BLD-MCNC: 0.6 MG/DL (ref 0.6–1.3)
D-LACTATE SERPL-SCNC: 0.5 MMOL/L (ref 0.5–2)
DEPRECATED RDW RBC AUTO: 47.3 FL (ref 37–54)
EOSINOPHIL # BLD AUTO: 0.04 10*3/MM3 (ref 0–0.7)
EOSINOPHIL NFR BLD AUTO: 0.5 % (ref 0–7)
ERYTHROCYTE [DISTWIDTH] IN BLOOD BY AUTOMATED COUNT: 14.2 % (ref 11.5–14.5)
GFR SERPL CREATININE-BSD FRML MDRD: 99 ML/MIN/1.73
GLOBULIN UR ELPH-MCNC: 2.5 GM/DL
GLUCOSE BLD-MCNC: 87 MG/DL (ref 74–98)
GLUCOSE UR STRIP-MCNC: NEGATIVE MG/DL
HCT VFR BLD AUTO: 45.7 % (ref 37–47)
HGB BLD-MCNC: 14.3 G/DL (ref 12–16)
HGB UR QL STRIP.AUTO: NEGATIVE
HOLD SPECIMEN: NORMAL
HOLD SPECIMEN: NORMAL
IMM GRANULOCYTES # BLD: 0.02 10*3/MM3 (ref 0–0.06)
IMM GRANULOCYTES NFR BLD: 0.3 % (ref 0–0.6)
KETONES UR QL STRIP: NEGATIVE
LEUKOCYTE ESTERASE UR QL STRIP.AUTO: NEGATIVE
LIPASE SERPL-CCNC: 47 U/L (ref 23–300)
LYMPHOCYTES # BLD AUTO: 2.16 10*3/MM3 (ref 0.6–3.4)
LYMPHOCYTES NFR BLD AUTO: 27.8 % (ref 10–50)
MCH RBC QN AUTO: 28.3 PG (ref 27–31)
MCHC RBC AUTO-ENTMCNC: 31.3 G/DL (ref 30–37)
MCV RBC AUTO: 90.3 FL (ref 81–99)
MONOCYTES # BLD AUTO: 0.41 10*3/MM3 (ref 0–0.9)
MONOCYTES NFR BLD AUTO: 5.3 % (ref 0–12)
NEUTROPHILS # BLD AUTO: 5.1 10*3/MM3 (ref 2–6.9)
NEUTROPHILS NFR BLD AUTO: 65.5 % (ref 37–80)
NITRITE UR QL STRIP: NEGATIVE
NRBC BLD MANUAL-RTO: 0 /100 WBC (ref 0–0)
PH UR STRIP.AUTO: 8.5 [PH] (ref 5–8)
PLATELET # BLD AUTO: 232 10*3/MM3 (ref 130–400)
PMV BLD AUTO: 10.6 FL (ref 6–12)
POTASSIUM BLD-SCNC: 4 MMOL/L (ref 3.5–5.1)
PROT SERPL-MCNC: 6.1 G/DL (ref 6.3–8.2)
PROT UR QL STRIP: ABNORMAL
RBC # BLD AUTO: 5.06 10*6/MM3 (ref 4.2–5.4)
SODIUM BLD-SCNC: 141 MMOL/L (ref 137–145)
SP GR UR STRIP: 1.01 (ref 1–1.03)
UROBILINOGEN UR QL STRIP: ABNORMAL
WBC NRBC COR # BLD: 7.78 10*3/MM3 (ref 4.8–10.8)
WHOLE BLOOD HOLD SPECIMEN: NORMAL
WHOLE BLOOD HOLD SPECIMEN: NORMAL

## 2017-11-21 PROCEDURE — 83690 ASSAY OF LIPASE: CPT | Performed by: STUDENT IN AN ORGANIZED HEALTH CARE EDUCATION/TRAINING PROGRAM

## 2017-11-21 PROCEDURE — 25010000002 MORPHINE PER 10 MG: Performed by: STUDENT IN AN ORGANIZED HEALTH CARE EDUCATION/TRAINING PROGRAM

## 2017-11-21 PROCEDURE — 96375 TX/PRO/DX INJ NEW DRUG ADDON: CPT

## 2017-11-21 PROCEDURE — 96374 THER/PROPH/DIAG INJ IV PUSH: CPT

## 2017-11-21 PROCEDURE — 81003 URINALYSIS AUTO W/O SCOPE: CPT | Performed by: STUDENT IN AN ORGANIZED HEALTH CARE EDUCATION/TRAINING PROGRAM

## 2017-11-21 PROCEDURE — 0 IOPAMIDOL 61 % SOLUTION: Performed by: STUDENT IN AN ORGANIZED HEALTH CARE EDUCATION/TRAINING PROGRAM

## 2017-11-21 PROCEDURE — 99283 EMERGENCY DEPT VISIT LOW MDM: CPT

## 2017-11-21 PROCEDURE — 96361 HYDRATE IV INFUSION ADD-ON: CPT

## 2017-11-21 PROCEDURE — 74177 CT ABD & PELVIS W/CONTRAST: CPT

## 2017-11-21 PROCEDURE — 83605 ASSAY OF LACTIC ACID: CPT | Performed by: PHYSICIAN ASSISTANT

## 2017-11-21 PROCEDURE — 25010000002 ONDANSETRON PER 1 MG: Performed by: STUDENT IN AN ORGANIZED HEALTH CARE EDUCATION/TRAINING PROGRAM

## 2017-11-21 PROCEDURE — 80053 COMPREHEN METABOLIC PANEL: CPT | Performed by: STUDENT IN AN ORGANIZED HEALTH CARE EDUCATION/TRAINING PROGRAM

## 2017-11-21 PROCEDURE — 85025 COMPLETE CBC W/AUTO DIFF WBC: CPT | Performed by: STUDENT IN AN ORGANIZED HEALTH CARE EDUCATION/TRAINING PROGRAM

## 2017-11-21 RX ORDER — MORPHINE SULFATE 2 MG/ML
4 INJECTION, SOLUTION INTRAMUSCULAR; INTRAVENOUS ONCE
Status: COMPLETED | OUTPATIENT
Start: 2017-11-21 | End: 2017-11-21

## 2017-11-21 RX ORDER — SODIUM CHLORIDE 0.9 % (FLUSH) 0.9 %
10 SYRINGE (ML) INJECTION AS NEEDED
Status: DISCONTINUED | OUTPATIENT
Start: 2017-11-21 | End: 2017-11-21 | Stop reason: HOSPADM

## 2017-11-21 RX ORDER — ONDANSETRON 4 MG/1
4 TABLET, ORALLY DISINTEGRATING ORAL EVERY 6 HOURS PRN
Qty: 10 TABLET | Refills: 0 | Status: SHIPPED | OUTPATIENT
Start: 2017-11-21 | End: 2017-12-26

## 2017-11-21 RX ORDER — PANTOPRAZOLE SODIUM 40 MG/1
40 TABLET, DELAYED RELEASE ORAL DAILY
Qty: 30 TABLET | Refills: 0 | Status: SHIPPED | OUTPATIENT
Start: 2017-11-21 | End: 2018-09-19 | Stop reason: SDUPTHER

## 2017-11-21 RX ORDER — PANTOPRAZOLE SODIUM 40 MG/1
40 TABLET, DELAYED RELEASE ORAL ONCE
Status: COMPLETED | OUTPATIENT
Start: 2017-11-21 | End: 2017-11-21

## 2017-11-21 RX ORDER — FAMOTIDINE 10 MG/ML
20 INJECTION, SOLUTION INTRAVENOUS ONCE
Status: COMPLETED | OUTPATIENT
Start: 2017-11-21 | End: 2017-11-21

## 2017-11-21 RX ORDER — ONDANSETRON 2 MG/ML
4 INJECTION INTRAMUSCULAR; INTRAVENOUS ONCE
Status: COMPLETED | OUTPATIENT
Start: 2017-11-21 | End: 2017-11-21

## 2017-11-21 RX ADMIN — ONDANSETRON 4 MG: 2 INJECTION INTRAMUSCULAR; INTRAVENOUS at 18:31

## 2017-11-21 RX ADMIN — IOPAMIDOL 90 ML: 612 INJECTION, SOLUTION INTRAVENOUS at 19:05

## 2017-11-21 RX ADMIN — PANTOPRAZOLE SODIUM 40 MG: 40 TABLET, DELAYED RELEASE ORAL at 19:56

## 2017-11-21 RX ADMIN — SODIUM CHLORIDE 1000 ML: 9 INJECTION, SOLUTION INTRAVENOUS at 18:12

## 2017-11-21 RX ADMIN — FAMOTIDINE 20 MG: 10 INJECTION, SOLUTION INTRAVENOUS at 18:12

## 2017-11-21 RX ADMIN — MORPHINE SULFATE 4 MG: 2 INJECTION, SOLUTION INTRAMUSCULAR; INTRAVENOUS at 18:31

## 2017-11-21 NOTE — ED PROVIDER NOTES
Subjective   HPI Comments: 72-year-old female that is here with a one-week history of severe, sharp, stabbing pain in the epigastric, right upper quadrant regions that is now diffusely throughout the abdomen for about one week, progressively worsening.  Symptoms developed after eating pizza and salad one week ago.  Some nausea and vomiting without blood in the vomitus.  No diarrhea.  She is constipated.  Also complains of dysuria that is chronic in nature.  Apparently was seen at the [facility] for this and they performed a gallbladder ultrasound that showed no gallstones.  She is scheduled to have a HIDA scan on Friday.  Apparently has some plain films of her abdomen on 11/17/17 that revealed a possible ileus and was sent to the ER for this.  She was told that she might have a bowel obstruction on the phone today and was sent to the ER.  Her last bowel movement was Friday.    Aggravating factors: Palpation of the abdomen, eating and drinking.  Alleviating factors: None.  Treatment prior to arrival: Called urgent care and sent here.    She is status post appendectomy and hysterectomy.  Long-standing history of acid reflux and she is no longer taking Protonix daily.      History provided by:  Patient  History limited by: nothing.   used: No        Review of Systems   Constitutional: Negative.    HENT: Negative.    Eyes: Negative.    Respiratory: Negative.    Cardiovascular: Negative.  Negative for chest pain.   Gastrointestinal: Positive for abdominal pain, nausea and vomiting.   Endocrine: Negative.    Genitourinary: Negative for dysuria.   Musculoskeletal: Negative.    Skin: Negative.    Allergic/Immunologic: Negative.    Neurological: Negative.    Hematological: Negative.    Psychiatric/Behavioral: Negative.    All other systems reviewed and are negative.      Past Medical History:   Diagnosis Date   • Adrenal adenoma    • Anemia    • Anxiety    • Asthma    • Back pain    • Benign  colonic polyp    • COPD (chronic obstructive pulmonary disease) 2005   • Depression    • Fibromyalgia    • Gastritis    • Generalized anxiety disorder    • Hemorrhoids    • History of blood transfusion    • History of endometriosis    • History of osteoarthritis    • Hypertension    • Kidney stone    • Liver cyst    • Nodular radiologic density    • Occult GI bleeding     · Last Impression: 03 Mar 2015  Likely secondary to erosive gastritis.  Additionally the   patient has history of vascular ectasias within the colon in the past.  There is no   history of anemia in the laboratory tests available.  Goyo Nunez (Gastroenterology)    • Osteoarthritis    • Renal cyst    • Sinus problem     2014   • Sinusitis    • Skin cancer     basal cell carcinoma   • Tobacco use    • Vitamin D deficiency    • Weight loss      · Last Impression: 03 Mar 2015  Improved.  Goyo Nunez (Gastroenterology)       No Known Allergies    Past Surgical History:   Procedure Laterality Date   • APPENDECTOMY  1980s   • CATARACT EXTRACTION  2013    both eyes   • COLONOSCOPY  2013    Dr Nunez, tubular adenoma   • COLONOSCOPY W/ BIOPSIES AND POLYPECTOMY     • HYSTERECTOMY  1980s    partial   • TONSILLECTOMY  1987   • UPPER GASTROINTESTINAL ENDOSCOPY  2015       Family History   Problem Relation Age of Onset   • Stomach cancer Brother    • Colon cancer Maternal Aunt    • No Known Problems Mother    • No Known Problems Father    • No Known Problems Sister    • No Known Problems Maternal Uncle    • No Known Problems Paternal Aunt    • No Known Problems Paternal Uncle    • No Known Problems Maternal Grandmother    • No Known Problems Maternal Grandfather    • No Known Problems Paternal Grandmother    • Lung cancer Paternal Grandfather    • Celiac disease Neg Hx    • Cirrhosis Neg Hx    • Colon polyps Neg Hx    • Crohn's disease Neg Hx    • Cystic fibrosis Neg Hx    • Esophageal cancer Neg Hx    • Hemochromatosis Neg Hx    • Inflammatory bowel disease  Neg Hx    • Irritable bowel syndrome Neg Hx    • Liver cancer Neg Hx    • Liver disease Neg Hx    • Rectal cancer Neg Hx    • Ulcerative colitis Neg Hx    • Esdras's disease Neg Hx    • Alcohol abuse Neg Hx    • Pancreatitis Neg Hx        Social History     Social History   • Marital status:      Spouse name: N/A   • Number of children: N/A   • Years of education: N/A     Social History Main Topics   • Smoking status: Former Smoker     Types: Cigarettes   • Smokeless tobacco: Never Used      Comment: using nicotin patches   • Alcohol use No      Comment: glass of wine once/twice a year   • Drug use: No   • Sexual activity: Defer     Other Topics Concern   • None     Social History Narrative           Objective   Physical Exam   Constitutional: She is oriented to person, place, and time. She appears well-developed and well-nourished. No distress.   HENT:   Head: Normocephalic and atraumatic.   Right Ear: External ear normal.   Left Ear: External ear normal.   Eyes: EOM are normal. Pupils are equal, round, and reactive to light.   Neck: Normal range of motion. Neck supple.   Cardiovascular: Normal rate, regular rhythm and normal heart sounds.    Pulmonary/Chest: Effort normal and breath sounds normal. No stridor. She has no wheezes. She exhibits no tenderness.   Abdominal: Soft. She exhibits no distension. There is tenderness. There is no rebound and no guarding.   The patient has mild-to-moderate diffuse tenderness, worst in the epigastric region.  No rebound or guarding.  No distention noted.   Musculoskeletal: Normal range of motion. She exhibits no edema.   Neurological: She is alert and oriented to person, place, and time.   Skin: Skin is warm and dry. No rash noted. She is not diaphoretic.   Psychiatric: She has a normal mood and affect. Her behavior is normal. Judgment and thought content normal.   Nursing note and vitals reviewed.      Procedures         ED Course  ED Course   Comment By Time   I have  reviewed the patient's old records.  She had a flat and upright abdomen as well as a gallbladder ultrasound performed on 11/17/17 revealing a possible ileus on the x-rays and no gallstones on the ultrasound. Sandie Lugo PA-C 11/21 1754                  MDM  Number of Diagnoses or Management Options  Acute gastritis without hemorrhage, unspecified gastritis type: new and requires workup  Adrenal adenoma, left: new and requires workup  Diffuse abdominal pain: new and requires workup  Diagnosis management comments: I discussed the patient's CT findings with her including the left adrenal adenoma, bilateral intrarenal stones and gastritis.  We are treating for gastritis with Protonix and she understands she needs to see Dr. Meraz for consideration of stomach scope.  She also understands she needs to have this left adrenal adenoma followed up on by her primary care M.D.       Amount and/or Complexity of Data Reviewed  Clinical lab tests: reviewed and ordered  Tests in the radiology section of CPT®: ordered and reviewed    Risk of Complications, Morbidity, and/or Mortality  Presenting problems: moderate  Diagnostic procedures: low  Management options: moderate    Patient Progress  Patient progress: stable      Final diagnoses:   Diffuse abdominal pain   Acute gastritis without hemorrhage, unspecified gastritis type   Adrenal adenoma, left            Sandie Lugo PA-C  11/21/17 1932

## 2017-11-21 NOTE — PROGRESS NOTES
Please call the patient regarding her abnormal result.    Scheduled for HIDA scan Friday per patient.

## 2017-11-22 NOTE — DISCHARGE INSTRUCTIONS
Return to the ER for markedly worsening abdominal pain, fever, vomiting blood, passing blood through your bowels or having black tarry stool.  Follow-up with your family doctor tomorrow for abdomen reexamination, further workup, treatment and evaluation.    Follow-up with Dr. Meraz, gastroenterologist, for consideration of stomach scope.  Call for appointment.    Discontinue ibuprofen, Aleve.  Avoid greasy or spicy foods.  Avoid caffeine and cigarettes.

## 2017-11-24 ENCOUNTER — HOSPITAL ENCOUNTER (OUTPATIENT)
Dept: NUCLEAR MEDICINE | Facility: HOSPITAL | Age: 72
Discharge: HOME | End: 2017-11-24

## 2017-11-24 DIAGNOSIS — K80.50 BILIARY PAIN: ICD-10-CM

## 2017-11-24 DIAGNOSIS — R11.2 NAUSEA AND VOMITING, INTRACTABILITY OF VOMITING NOT SPECIFIED, UNSPECIFIED VOMITING TYPE: ICD-10-CM

## 2017-11-24 DIAGNOSIS — R10.11 RIGHT UPPER QUADRANT ABDOMINAL PAIN: ICD-10-CM

## 2017-11-24 PROCEDURE — 78227 HEPATOBIL SYST IMAGE W/DRUG: CPT

## 2017-11-24 PROCEDURE — A9537 TC99M MEBROFENIN: HCPCS | Performed by: NURSE PRACTITIONER

## 2017-11-24 PROCEDURE — 0 TECHNETIUM TC 99M MEBROFENIN KIT: Performed by: NURSE PRACTITIONER

## 2017-11-24 PROCEDURE — 25010000002 SINCALIDE PER 5 MCG: Performed by: NURSE PRACTITIONER

## 2017-11-24 RX ORDER — KIT FOR THE PREPARATION OF TECHNETIUM TC 99M MEBROFENIN 45 MG/10ML
1 INJECTION, POWDER, LYOPHILIZED, FOR SOLUTION INTRAVENOUS
Status: COMPLETED | OUTPATIENT
Start: 2017-11-24 | End: 2017-11-24

## 2017-11-24 RX ADMIN — MEBROFENIN 1 DOSE: 45 INJECTION, POWDER, LYOPHILIZED, FOR SOLUTION INTRAVENOUS at 12:19

## 2017-11-24 RX ADMIN — SINCALIDE 1.1 MCG: 5 INJECTION, POWDER, LYOPHILIZED, FOR SOLUTION INTRAVENOUS at 13:04

## 2017-11-27 RX ORDER — DULOXETIN HYDROCHLORIDE 60 MG/1
CAPSULE, DELAYED RELEASE ORAL
Qty: 30 CAPSULE | Refills: 3 | Status: SHIPPED | OUTPATIENT
Start: 2017-11-27 | End: 2018-02-09 | Stop reason: SDUPTHER

## 2017-12-18 RX ORDER — ALBUTEROL SULFATE 90 UG/1
2 AEROSOL, METERED RESPIRATORY (INHALATION) EVERY 4 HOURS PRN
Qty: 1 INHALER | Refills: 5 | Status: SHIPPED | OUTPATIENT
Start: 2017-12-18 | End: 2018-04-26 | Stop reason: SDUPTHER

## 2017-12-18 NOTE — TELEPHONE ENCOUNTER
Medication refill for Inhaler.    Patients medication has been sent to [facility] at [address] [place].    Patient would also like to schedule a mammogram.  She would like this done on the same day as her appointment on Jan 4th. Patient would like to have it done down stairs.    OK to order?

## 2017-12-26 ENCOUNTER — OFFICE VISIT (OUTPATIENT)
Dept: INTERNAL MEDICINE | Facility: CLINIC | Age: 72
End: 2017-12-26

## 2017-12-26 DIAGNOSIS — J01.00 ACUTE MAXILLARY SINUSITIS, RECURRENCE NOT SPECIFIED: ICD-10-CM

## 2017-12-26 DIAGNOSIS — R68.89 FLU-LIKE SYMPTOMS: Primary | ICD-10-CM

## 2017-12-26 LAB
EXPIRATION DATE: NORMAL
FLUAV AG NPH QL: NORMAL
FLUBV AG NPH QL: NORMAL
INTERNAL CONTROL: NORMAL
Lab: NORMAL

## 2017-12-26 PROCEDURE — 87804 INFLUENZA ASSAY W/OPTIC: CPT | Performed by: PHYSICIAN ASSISTANT

## 2017-12-26 PROCEDURE — 99213 OFFICE O/P EST LOW 20 MIN: CPT | Performed by: PHYSICIAN ASSISTANT

## 2017-12-26 RX ORDER — HYDROCODONE BITARTRATE AND ACETAMINOPHEN 10; 325 MG/1; MG/1
TABLET ORAL 4 TIMES DAILY PRN
Refills: 0 | COMMUNITY
Start: 2017-11-27 | End: 2021-03-11

## 2017-12-26 RX ORDER — AMOXICILLIN AND CLAVULANATE POTASSIUM 875; 125 MG/1; MG/1
1 TABLET, FILM COATED ORAL EVERY 12 HOURS SCHEDULED
Qty: 20 TABLET | Refills: 0 | Status: SHIPPED | OUTPATIENT
Start: 2017-12-26 | End: 2018-01-04

## 2017-12-27 RX ORDER — TRAZODONE HYDROCHLORIDE 50 MG/1
TABLET ORAL
Qty: 60 TABLET | Refills: 2 | Status: SHIPPED | OUTPATIENT
Start: 2017-12-27 | End: 2018-04-26 | Stop reason: SDUPTHER

## 2017-12-28 VITALS
WEIGHT: 116 LBS | DIASTOLIC BLOOD PRESSURE: 70 MMHG | SYSTOLIC BLOOD PRESSURE: 120 MMHG | OXYGEN SATURATION: 91 % | HEIGHT: 65 IN | HEART RATE: 93 BPM | TEMPERATURE: 97.3 F | BODY MASS INDEX: 19.33 KG/M2

## 2017-12-28 NOTE — PROGRESS NOTES
"Subjective     Chief Complaint: sinus pressure, cough    History of Present Illness     Gabi Dudley is a 72 y.o. female presenting with complaints of headache, sinus pressure, ear pain, rhinorrhea, productive cough. She has been using her albuterol inhaler more often at home. Denies fevers, chills.    The following portions of the patient's history were reviewed and updated as appropriate: current medications, allergies, PMH.    Review of Systems   Constitutional: Positive for fatigue. Negative for chills and fever.   HENT: Positive for congestion, ear pain, rhinorrhea and sinus pressure. Negative for sore throat.    Respiratory: Positive for cough and shortness of breath. Negative for chest tightness and wheezing.    Cardiovascular: Negative for chest pain, palpitations and leg swelling.   Gastrointestinal: Negative for abdominal pain, diarrhea, nausea and vomiting.   Neurological: Positive for headaches. Negative for dizziness, weakness and light-headedness.       Objective     Vitals:    12/26/17 1607 12/26/17 1627   BP: 120/70    Pulse: 93    Temp: 97.3 °F (36.3 °C)    SpO2: (!) 89% 91%   Weight: 52.6 kg (116 lb)    Height: 165.1 cm (65\")        Physical Exam   Constitutional:   Thin female, ill-appearing.   HENT:   Head: Normocephalic and atraumatic.   Right Ear: Tympanic membrane and external ear normal.   Left Ear: Tympanic membrane and external ear normal.   Nose: Rhinorrhea present. Right sinus exhibits maxillary sinus tenderness. Left sinus exhibits maxillary sinus tenderness.   Mouth/Throat: Posterior oropharyngeal erythema present.   Cardiovascular: Normal rate, regular rhythm and normal heart sounds.    Pulmonary/Chest: She has decreased breath sounds. She has no wheezes. She has no rales. She exhibits no tenderness.      Assessment/Plan     Diagnoses and all orders for this visit:    Flu-like symptoms  -     POC Influenza A / B    Acute maxillary sinusitis, not specified as recurrent  -     " amoxicillin-clavulanate (AUGMENTIN) 875-125 MG per tablet; Take 1 tablet by mouth Every 12 (Twelve) Hours for 10 days.    Influenza negative.   Rest, push fluids.  RTC if symptoms worsen or fail to improve.    Gardenia Huang PA-C  12/26/2017         Please note that portions of this note were completed with a voice recognition program. Efforts were made to edit dictation, but occasionally words are mistranscribed.

## 2018-01-04 ENCOUNTER — OFFICE VISIT (OUTPATIENT)
Dept: INTERNAL MEDICINE | Facility: CLINIC | Age: 73
End: 2018-01-04

## 2018-01-04 VITALS
WEIGHT: 114 LBS | OXYGEN SATURATION: 98 % | TEMPERATURE: 97.2 F | HEART RATE: 90 BPM | DIASTOLIC BLOOD PRESSURE: 60 MMHG | BODY MASS INDEX: 18.99 KG/M2 | SYSTOLIC BLOOD PRESSURE: 102 MMHG | HEIGHT: 65 IN | RESPIRATION RATE: 14 BRPM

## 2018-01-04 DIAGNOSIS — D35.00 ADRENAL ADENOMA, UNSPECIFIED LATERALITY: ICD-10-CM

## 2018-01-04 DIAGNOSIS — M79.7 FIBROMYALGIA: ICD-10-CM

## 2018-01-04 DIAGNOSIS — E55.9 VITAMIN D DEFICIENCY: ICD-10-CM

## 2018-01-04 DIAGNOSIS — I10 ESSENTIAL HYPERTENSION: ICD-10-CM

## 2018-01-04 DIAGNOSIS — N28.1 SIMPLE RENAL CYST: ICD-10-CM

## 2018-01-04 DIAGNOSIS — F41.9 ANXIETY: ICD-10-CM

## 2018-01-04 DIAGNOSIS — Z12.39 BREAST SCREENING: Primary | ICD-10-CM

## 2018-01-04 DIAGNOSIS — E78.5 HYPERLIPIDEMIA, UNSPECIFIED HYPERLIPIDEMIA TYPE: Primary | ICD-10-CM

## 2018-01-04 PROBLEM — N39.0 URINARY TRACT INFECTION WITH HEMATURIA: Status: RESOLVED | Noted: 2017-01-17 | Resolved: 2018-01-04

## 2018-01-04 PROBLEM — R31.9 URINARY TRACT INFECTION WITH HEMATURIA: Status: RESOLVED | Noted: 2017-01-17 | Resolved: 2018-01-04

## 2018-01-04 PROCEDURE — 99214 OFFICE O/P EST MOD 30 MIN: CPT | Performed by: INTERNAL MEDICINE

## 2018-01-04 NOTE — PROGRESS NOTES
Subjective   Gabi Dudley is a 72 y.o. female.     Chief Complaint   Patient presents with   • Follow-up     weight loss not intentional - has been sick    • Hypertension       History of Present Illness   Recent GE and weight loss 6 Ib. Getting better now , appetite normal and no n/v/d/c. Anxiety and depression on meds and patient still have some depression no SI. HTN stable on med one. Tob still. HLD stable on med and GERD stable on med    Current Outpatient Prescriptions:   •  albuterol (ACCUNEB) 1.25 MG/3ML nebulizer solution, Take 3 mL by nebulization Every 6 (Six) Hours As Needed for Wheezing., Disp: 30 vial, Rfl: 3  •  albuterol (PROVENTIL HFA;VENTOLIN HFA) 108 (90 Base) MCG/ACT inhaler, Inhale 2 puffs Every 4 (Four) Hours As Needed for Wheezing., Disp: 1 inhaler, Rfl: 5  •  clonazePAM (KlonoPIN) 1 MG tablet, Take 1 tablet by mouth 2 (Two) Times a Day As Needed for Seizures., Disp: 30 tablet, Rfl: 3  •  cyclobenzaprine (FLEXERIL) 10 MG tablet, Take 1 tablet by mouth 3 (Three) Times a Day As Needed for muscle spasms., Disp: 30 tablet, Rfl: 3  •  DULoxetine (CYMBALTA) 60 MG capsule, take 1 capsule by mouth once daily, Disp: 30 capsule, Rfl: 3  •  fluticasone (FLONASE) 50 MCG/ACT nasal spray, 1 spray into each nostril Daily., Disp: 1 each, Rfl: 11  •  HYDROcodone-acetaminophen (NORCO)  MG per tablet, take 1 tablet by mouth three times a day, Disp: , Rfl: 0  •  loratadine (CLARITIN) 10 MG tablet, Take  by mouth., Disp: , Rfl:   •  lovastatin (MEVACOR) 40 MG tablet, take 1 tablet by mouth at bedtime, Disp: 90 tablet, Rfl: 3  •  ondansetron ODT (ZOFRAN-ODT) 4 MG disintegrating tablet, Take 1 tablet by mouth Every 8 (Eight) Hours As Needed for Nausea or Vomiting., Disp: 15 tablet, Rfl: 1  •  pantoprazole (PROTONIX) 40 MG EC tablet, Take 1 tablet by mouth Daily., Disp: 30 tablet, Rfl: 0  •  promethazine (PHENERGAN) 25 MG tablet, Take 1 tablet by mouth Every 6 (Six) Hours As Needed for Nausea or Vomiting.,  Disp: 20 tablet, Rfl: 0  •  sertraline (ZOLOFT) 100 MG tablet, Take 1 tablet by mouth Daily., Disp: 30 tablet, Rfl: 5  •  traZODone (DESYREL) 50 MG tablet, take 1 to 2 tablets by mouth at bedtime if needed for insomnia, Disp: 60 tablet, Rfl: 2  •  VOLTAREN 1 % gel gel, , Disp: , Rfl: 0  •  ZOSTAVAX 44109 UNT/0.65ML injection, , Disp: , Rfl:     The following portions of the patient's history were reviewed and updated as appropriate: allergies, current medications, past family history, past medical history, past social history, past surgical history and problem list.    Review of Systems   Constitutional: Negative.    Respiratory: Negative.    Cardiovascular: Negative.    Gastrointestinal: Negative.    Musculoskeletal: Negative.    Skin: Negative.    Neurological: Negative.    Psychiatric/Behavioral: Negative.        Objective   Physical Exam   Constitutional: She is oriented to person, place, and time. She appears well-nourished.   Neck: Neck supple.   Cardiovascular: Normal rate, regular rhythm and normal heart sounds.    Pulmonary/Chest: Effort normal and breath sounds normal.   Abdominal: Bowel sounds are normal.   Neurological: She is alert and oriented to person, place, and time.   Skin: Skin is warm.   Psychiatric: She has a normal mood and affect.       All tests have been reviewed.    Assessment/Plan   There are no diagnoses linked to this encounter.           tob, CT lung 2014, lung nodule, scarring and emphysema, repeat CT lung no change 1/2017  hematuria repeat normal  hyperglycemia, A1c 6.2 continue diet  vitD low continue vitD3 1000u daily  anxiety continue zoloft 150mg change to 100mg  and klonopin 1mg daily prn , continue cymbalta 60, refill klonopin prn.   Weight loss 6 Ib over 6 week due to viral infection  CT scan showed liver cyst, large kidney cyst, adrenal gland adenoma, kidney stones non-obstructing. repeat showed no change 11/2017  right knee OA on xray aleve prn knee brace help  hip pain s/p  steroid helped improved  Hyperlipidemia stable on medicine continue  Hypertension no more low bp echo normal  Osteoporosis continue medication, refuse dexa   heme+, Colon polyps repeat 6/2019. EGD done  gastritis continue protonix  COPD continue medication. need to Quit tobacco. tudorsa, proair, continue O2 prn  Insomnia continue trazodone 100mg refill  Fibromyalgia stable watch continue medicine and follow up with pain clinic,with lortab, continue followup with pain clinic  VacUTD, pneumovax again 9/14/17, in pharmacy  Tension HA continue flexeril and naproxen  allergy flonase continue  sinus polyp per dentist, obtain rec.    Nipple retraction mamm and US:negative pathology  tob to quit,   Cysts in liver and kidney, watch  RBC high, recheck. normal  follow up in 3 mo

## 2018-01-08 ENCOUNTER — TRANSCRIBE ORDERS (OUTPATIENT)
Dept: MAMMOGRAPHY | Facility: HOSPITAL | Age: 73
End: 2018-01-08

## 2018-01-08 DIAGNOSIS — Z12.39 BREAST CANCER SCREENING: Primary | ICD-10-CM

## 2018-01-22 ENCOUNTER — OFFICE VISIT (OUTPATIENT)
Dept: INTERNAL MEDICINE | Facility: CLINIC | Age: 73
End: 2018-01-22

## 2018-01-22 ENCOUNTER — DOCUMENTATION (OUTPATIENT)
Dept: ADMINISTRATIVE | Facility: HOSPITAL | Age: 73
End: 2018-01-22

## 2018-01-22 ENCOUNTER — APPOINTMENT (OUTPATIENT)
Dept: CT IMAGING | Facility: HOSPITAL | Age: 73
End: 2018-01-22

## 2018-01-22 ENCOUNTER — HOSPITAL ENCOUNTER (EMERGENCY)
Facility: HOSPITAL | Age: 73
Discharge: HOME | End: 2018-01-22
Attending: EMERGENCY MEDICINE | Admitting: EMERGENCY MEDICINE

## 2018-01-22 VITALS
SYSTOLIC BLOOD PRESSURE: 115 MMHG | OXYGEN SATURATION: 92 % | DIASTOLIC BLOOD PRESSURE: 78 MMHG | WEIGHT: 110 LBS | HEIGHT: 65 IN | BODY MASS INDEX: 18.33 KG/M2 | HEART RATE: 112 BPM | TEMPERATURE: 98.1 F | RESPIRATION RATE: 18 BRPM

## 2018-01-22 VITALS
OXYGEN SATURATION: 89 % | TEMPERATURE: 94.6 F | HEIGHT: 65 IN | SYSTOLIC BLOOD PRESSURE: 118 MMHG | HEART RATE: 92 BPM | BODY MASS INDEX: 18.33 KG/M2 | DIASTOLIC BLOOD PRESSURE: 62 MMHG | WEIGHT: 110 LBS

## 2018-01-22 DIAGNOSIS — I31.39 PERICARDIAL EFFUSION: ICD-10-CM

## 2018-01-22 DIAGNOSIS — J44.1 COPD EXACERBATION (HCC): Primary | ICD-10-CM

## 2018-01-22 LAB
ALBUMIN SERPL-MCNC: 3.6 G/DL (ref 3.5–5)
ALBUMIN/GLOB SERPL: 1.1 G/DL (ref 1–2)
ALP SERPL-CCNC: 77 U/L (ref 38–126)
ALT SERPL W P-5'-P-CCNC: 17 U/L (ref 13–69)
ANION GAP SERPL CALCULATED.3IONS-SCNC: 11.7 MMOL/L
AST SERPL-CCNC: 17 U/L (ref 15–46)
BASOPHILS # BLD AUTO: 0.04 10*3/MM3 (ref 0–0.2)
BASOPHILS NFR BLD AUTO: 0.4 % (ref 0–2.5)
BILIRUB SERPL-MCNC: 0.4 MG/DL (ref 0.2–1.3)
BUN BLD-MCNC: 21 MG/DL (ref 7–20)
BUN/CREAT SERPL: 30 (ref 7.1–23.5)
CALCIUM SPEC-SCNC: 9.5 MG/DL (ref 8.4–10.2)
CHLORIDE SERPL-SCNC: 101 MMOL/L (ref 98–107)
CO2 SERPL-SCNC: 33 MMOL/L (ref 26–30)
CREAT BLD-MCNC: 0.7 MG/DL (ref 0.6–1.3)
DEPRECATED RDW RBC AUTO: 46.5 FL (ref 37–54)
EOSINOPHIL # BLD AUTO: 0.08 10*3/MM3 (ref 0–0.7)
EOSINOPHIL NFR BLD AUTO: 0.8 % (ref 0–7)
ERYTHROCYTE [DISTWIDTH] IN BLOOD BY AUTOMATED COUNT: 14.2 % (ref 11.5–14.5)
FLUAV AG NPH QL: NEGATIVE
FLUBV AG NPH QL IA: NEGATIVE
GFR SERPL CREATININE-BSD FRML MDRD: 83 ML/MIN/1.73
GLOBULIN UR ELPH-MCNC: 3.2 GM/DL
GLUCOSE BLD-MCNC: 175 MG/DL (ref 74–98)
HCT VFR BLD AUTO: 44.9 % (ref 37–47)
HGB BLD-MCNC: 13.9 G/DL (ref 12–16)
IMM GRANULOCYTES # BLD: 0.05 10*3/MM3 (ref 0–0.06)
IMM GRANULOCYTES NFR BLD: 0.5 % (ref 0–0.6)
LYMPHOCYTES # BLD AUTO: 2.15 10*3/MM3 (ref 0.6–3.4)
LYMPHOCYTES NFR BLD AUTO: 22.2 % (ref 10–50)
MCH RBC QN AUTO: 27.7 PG (ref 27–31)
MCHC RBC AUTO-ENTMCNC: 31 G/DL (ref 30–37)
MCV RBC AUTO: 89.6 FL (ref 81–99)
MONOCYTES # BLD AUTO: 0.63 10*3/MM3 (ref 0–0.9)
MONOCYTES NFR BLD AUTO: 6.5 % (ref 0–12)
NEUTROPHILS # BLD AUTO: 6.75 10*3/MM3 (ref 2–6.9)
NEUTROPHILS NFR BLD AUTO: 69.6 % (ref 37–80)
NRBC BLD MANUAL-RTO: 0 /100 WBC (ref 0–0)
PLATELET # BLD AUTO: 233 10*3/MM3 (ref 130–400)
PMV BLD AUTO: 11 FL (ref 6–12)
POTASSIUM BLD-SCNC: 3.7 MMOL/L (ref 3.5–5.1)
PROT SERPL-MCNC: 6.8 G/DL (ref 6.3–8.2)
RBC # BLD AUTO: 5.01 10*6/MM3 (ref 4.2–5.4)
S PYO AG THROAT QL: NEGATIVE
SODIUM BLD-SCNC: 142 MMOL/L (ref 137–145)
TROPONIN I SERPL-MCNC: <0.012 NG/ML (ref 0–0.03)
WBC NRBC COR # BLD: 9.7 10*3/MM3 (ref 4.8–10.8)

## 2018-01-22 PROCEDURE — 87880 STREP A ASSAY W/OPTIC: CPT | Performed by: EMERGENCY MEDICINE

## 2018-01-22 PROCEDURE — 99284 EMERGENCY DEPT VISIT MOD MDM: CPT

## 2018-01-22 PROCEDURE — 93005 ELECTROCARDIOGRAM TRACING: CPT | Performed by: PHYSICIAN ASSISTANT

## 2018-01-22 PROCEDURE — 87081 CULTURE SCREEN ONLY: CPT | Performed by: EMERGENCY MEDICINE

## 2018-01-22 PROCEDURE — 99214 OFFICE O/P EST MOD 30 MIN: CPT | Performed by: PHYSICIAN ASSISTANT

## 2018-01-22 PROCEDURE — 84484 ASSAY OF TROPONIN QUANT: CPT | Performed by: PHYSICIAN ASSISTANT

## 2018-01-22 PROCEDURE — 94640 AIRWAY INHALATION TREATMENT: CPT

## 2018-01-22 PROCEDURE — 71250 CT THORAX DX C-: CPT

## 2018-01-22 PROCEDURE — 80053 COMPREHEN METABOLIC PANEL: CPT | Performed by: PHYSICIAN ASSISTANT

## 2018-01-22 PROCEDURE — 87804 INFLUENZA ASSAY W/OPTIC: CPT | Performed by: EMERGENCY MEDICINE

## 2018-01-22 PROCEDURE — 85025 COMPLETE CBC W/AUTO DIFF WBC: CPT | Performed by: PHYSICIAN ASSISTANT

## 2018-01-22 PROCEDURE — 94799 UNLISTED PULMONARY SVC/PX: CPT

## 2018-01-22 PROCEDURE — 63710000001 PREDNISONE PER 5 MG: Performed by: PHYSICIAN ASSISTANT

## 2018-01-22 RX ORDER — DOXYCYCLINE 100 MG/1
100 CAPSULE ORAL ONCE
Status: COMPLETED | OUTPATIENT
Start: 2018-01-22 | End: 2018-01-22

## 2018-01-22 RX ORDER — DOXYCYCLINE 100 MG/1
100 CAPSULE ORAL 2 TIMES DAILY
Qty: 14 CAPSULE | Refills: 0 | Status: SHIPPED | OUTPATIENT
Start: 2018-01-22 | End: 2018-01-29

## 2018-01-22 RX ORDER — METHYLPREDNISOLONE 4 MG/1
TABLET ORAL
Qty: 21 EACH | Refills: 0 | Status: SHIPPED | OUTPATIENT
Start: 2018-01-22 | End: 2018-02-09

## 2018-01-22 RX ORDER — IPRATROPIUM BROMIDE AND ALBUTEROL SULFATE 2.5; .5 MG/3ML; MG/3ML
3 SOLUTION RESPIRATORY (INHALATION) ONCE
Status: COMPLETED | OUTPATIENT
Start: 2018-01-22 | End: 2018-01-22

## 2018-01-22 RX ADMIN — DOXYCYCLINE 100 MG: 100 CAPSULE ORAL at 20:21

## 2018-01-22 RX ADMIN — PREDNISONE 60 MG: 50 TABLET ORAL at 20:21

## 2018-01-22 RX ADMIN — SODIUM CHLORIDE 1000 ML: 9 INJECTION, SOLUTION INTRAVENOUS at 17:20

## 2018-01-22 RX ADMIN — IPRATROPIUM BROMIDE AND ALBUTEROL SULFATE 3 ML: .5; 3 SOLUTION RESPIRATORY (INHALATION) at 17:11

## 2018-01-22 NOTE — PROGRESS NOTES
"Subjective     Chief Complaint: congestion, cough    History of Present Illness     Gabi Dudley is a 72 y.o. female presenting with complaints of 2 weeks fever, chills, body aches, dizziness, sinus pressure, congestion, productive cough with thick green/yellow sputum, worsening shortness of breath and wheezing.  Experiences pain with deep breathing.  I saw the patient nearly one month ago and prescribed a course of Augmentin for acute sinusitis.  States she felt better for a few days and immediately felt sick again, this time much worse.  States she has no energy and no appetite.  Reports a 7 pound weight loss over the past month.  She's been wearing oxygen at home especially at night and having to use her albuterol inhaler more frequently.  Feels dizzy and lightheaded but denies any falls.  Denies chest pain, palpitations.    Daughter is present for the appointment.  States she has a history of pneumonia.    The following portions of the patient's history were reviewed and updated as appropriate: current medications, allergies, PMH.    Review of Systems   Constitutional: Positive for chills, fatigue and fever.   HENT: Positive for congestion, sinus pressure and sore throat. Negative for ear pain.    Respiratory: Positive for cough, shortness of breath and wheezing.    Cardiovascular: Negative for chest pain, palpitations and leg swelling.   Gastrointestinal: Negative for abdominal pain, blood in stool, constipation, diarrhea, nausea and vomiting.   Musculoskeletal: Positive for arthralgias and myalgias.   Neurological: Positive for dizziness, weakness, light-headedness and headaches.       Objective     Vitals:    01/22/18 1332   BP: 118/62   Pulse: 92   Temp: 94.6 °F (34.8 °C)   SpO2: (!) 89%   Weight: 49.9 kg (110 lb)   Height: 165.1 cm (65\")       Physical Exam   Constitutional: She is oriented to person, place, and time.   Frail.  Ill appearing.   HENT:   Head: Normocephalic and atraumatic.   Right Ear: " External ear normal.   Left Ear: External ear normal.   Nose: Right sinus exhibits frontal sinus tenderness. Left sinus exhibits frontal sinus tenderness.   Mouth/Throat: Oropharynx is clear and moist.   Eyes: EOM are normal. Pupils are equal, round, and reactive to light.   Neck: Normal range of motion. Neck supple.   Cardiovascular: Normal rate, regular rhythm and normal heart sounds.    Pulmonary/Chest: No respiratory distress. She has decreased breath sounds. She has wheezes in the right upper field and the right middle field.   Musculoskeletal: Normal range of motion.   Lymphadenopathy:     She has no cervical adenopathy.   Neurological: She is alert and oriented to person, place, and time.   Skin: Skin is warm and dry.   Psychiatric: She has a normal mood and affect.      Assessment/Plan     Diagnoses and all orders for this visit:    COPD exacerbation      COPD exacerbation, chest x-ray to rule out pneumonia.  O2 given in office, sats improved from 88 to 89%.  Patient's daughter states she will drive her to the ER.  I will attempt to get in touch with hospitalist regarding admission.    Gardenia Huang PA-C  01/22/2018         Please note that portions of this note were completed with a voice recognition program. Efforts were made to edit dictation, but occasionally words are mistranscribed.

## 2018-01-22 NOTE — ED PROVIDER NOTES
Subjective   HPI Comments: 72-year-old female presents with cough congestion intermittently for 2-3 weeks.  She's also had a sore throat body aches.  She states she has a history of COPD and she's dramatically on oxygen at home.  She also uses a nebulizer machine and an inhaler.  She was on antibiotics before [place] for COPD exacerbation.  She got better and symptoms returned.  She also states that she is being spitting up clear to gray sputum.      History provided by:  Patient   used: No        Review of Systems   Respiratory: Positive for cough, shortness of breath and wheezing.    All other systems reviewed and are negative.      Past Medical History:   Diagnosis Date   • Adrenal adenoma    • Anemia    • Anxiety    • Asthma    • Back pain    • Benign colonic polyp    • COPD (chronic obstructive pulmonary disease) 2005   • Depression    • Fibromyalgia    • Gastritis    • Generalized anxiety disorder    • Hemorrhoids    • History of blood transfusion    • History of endometriosis    • History of osteoarthritis    • Hypertension    • Kidney stone    • Liver cyst    • Nodular radiologic density    • Occult GI bleeding     · Last Impression: 03 Mar 2015  Likely secondary to erosive gastritis.  Additionally the   patient has history of vascular ectasias within the colon in the past.  There is no   history of anemia in the laboratory tests available.  Goyo Nunez (Gastroenterology)    • Osteoarthritis    • Renal cyst    • Sinus problem     2014   • Sinusitis    • Skin cancer     basal cell carcinoma   • Tobacco use    • Vitamin D deficiency    • Weight loss      · Last Impression: 03 Mar 2015  Improved.  Goyo Nunez (Gastroenterology)       No Known Allergies    Past Surgical History:   Procedure Laterality Date   • APPENDECTOMY  1980s   • CATARACT EXTRACTION  2013    both eyes   • COLONOSCOPY  2013    Dr Nunez, tubular adenoma   • COLONOSCOPY W/ BIOPSIES AND POLYPECTOMY     • HYSTERECTOMY   1980s    partial   • TONSILLECTOMY  1987   • UPPER GASTROINTESTINAL ENDOSCOPY  2015       Family History   Problem Relation Age of Onset   • Stomach cancer Brother    • Colon cancer Maternal Aunt    • No Known Problems Mother    • No Known Problems Father    • No Known Problems Sister    • No Known Problems Maternal Uncle    • No Known Problems Paternal Aunt    • No Known Problems Paternal Uncle    • No Known Problems Maternal Grandmother    • No Known Problems Maternal Grandfather    • No Known Problems Paternal Grandmother    • Lung cancer Paternal Grandfather    • Celiac disease Neg Hx    • Cirrhosis Neg Hx    • Colon polyps Neg Hx    • Crohn's disease Neg Hx    • Cystic fibrosis Neg Hx    • Esophageal cancer Neg Hx    • Hemochromatosis Neg Hx    • Inflammatory bowel disease Neg Hx    • Irritable bowel syndrome Neg Hx    • Liver cancer Neg Hx    • Liver disease Neg Hx    • Rectal cancer Neg Hx    • Ulcerative colitis Neg Hx    • Esdras's disease Neg Hx    • Alcohol abuse Neg Hx    • Pancreatitis Neg Hx        Social History     Social History   • Marital status:      Spouse name: N/A   • Number of children: N/A   • Years of education: N/A     Social History Main Topics   • Smoking status: Former Smoker     Types: Cigarettes   • Smokeless tobacco: Never Used      Comment: using nicotin patches   • Alcohol use No      Comment: glass of wine once/twice a year   • Drug use: No   • Sexual activity: Defer     Other Topics Concern   • None     Social History Narrative           Objective   Physical Exam   Constitutional: She is oriented to person, place, and time. She appears well-developed and well-nourished.   Eyes: EOM are normal.   Neck: Normal range of motion. Neck supple.   Cardiovascular: Normal rate and regular rhythm.    Pulmonary/Chest: Effort normal and breath sounds normal. She has no wheezes.   Abdominal: Soft. Bowel sounds are normal.   Musculoskeletal: Normal range of motion.   Neurological: She  is alert and oriented to person, place, and time. She has normal reflexes.   Skin: Skin is warm and dry.   Psychiatric: She has a normal mood and affect. Her behavior is normal.   Nursing note and vitals reviewed.      Procedures         ED Course  ED Course   Comment By Time   Discussed the EKG findings of PEG. fib and the moderate pericardial effusion with Dr. Dr. Siegel he is going to evaluate the patient in the emergency department Maximo Narayanan Jr., PA-C 01/22 1849   Dr. Siegel performed a bedside echo, he states that there is fluid but no cardiac tamponade.  He did not feel she needed emergent admission however she needs close follow-up with she and will arrange to see the patient within the next week.  I will treat her with antibiotics and steroids for COPD exacerbation Maximo Narayanan Jr., PA-C 01/22 1959                  [facility]    Final diagnoses:   COPD exacerbation   Pericardial effusion            Maximo Narayanan Jr., PA-C  01/22/18 2003

## 2018-01-23 ENCOUNTER — EPISODE CHANGES (OUTPATIENT)
Dept: CASE MANAGEMENT | Facility: OTHER | Age: 73
End: 2018-01-23

## 2018-01-23 NOTE — PROGRESS NOTES
Jero Siegel MD  CARDIOLOGY CONSULT    PATIENT: Gabi Dudley                                                   DATE: 18   Room/bed info not found  : 1945     PRIMARY PHYSICIAN: Paul Quinteros M.D.    CHIEF COMPLAINT: Paroxysmal atrial fibrillation, and pericardial effusion and known CAD    HISTORY OF PRESENT ILLNESS:  Patient is 72 y.o. old  female with risk profile for atherosclerotic cardiovascular disease comprising of history of long-standing hypertension with hypertensive heart disease, untreated dyslipidemia, glucose intolerance, age and continued tobacco abuse, who reportedly had diffuse atherosclerotic process including nonflow limiting CAD based on remote angiographic studies, who has history of apparent paroxysmal atrial fibrillation and presented emergency room on account of increasing shortness of air from last several weeks and was found to have radiological evidence of small pericardial effusion with suggestion of inflammatory exudate though without evidence of pericardial temponade.    Patient, who lives with her extended family, relates history of significant functional decline for more than a decade or so on account of rather advanced COPD with rather frequent exacerbations for last several months.  Patient apparently has not been able to walk significant distances on account of exertional dyspnea but she denies orthopnea, PND's or significant dependent edema.    Patient, who has had history of chest discomfort for about a month or so and retrosternal area, apparently has had more frequent episodes which occurred possibly several times a week though several of these episodes are not necessarily related with physical activities.  She describes vague sensation of heaviness across the chest without any definitive radiation into neck, jaw or shoulder area.    Patient, who reportedly had history of paroxysmal atrial fibrillation, volunteers history of occasional episode of  palpitation though she herself was not aware of any palpitation today.  There is no recent history of dizziness, presyncope or loss of consciousness.    Review of systems: constitutional:  Patient believes that she has lost substantial weight and has been afebrile. HEENT: No history of nasal discharge/nasal obstruction or sore throat. CNS: No history of headache and no history of visual complaints, seizure disorder, or sensory or motor complaints. BRONCHOPULMONARY: Patient has no history of cough, pleuritic chest pain or hemoptysis. GI: No history of nausea, vomiting, hematemesis, melena; No history of rectal bleeding : No history of nocturia, hematuria or dysuria; MUSCULOSKELETAL: No history of myalgias or athralgias DERM: No history of skin rash. ENDO: No history of cold or heat intolerance or thyromegaly. HEMATOPOIETEC: No history of bleeding from any site, anemia or lymphadenopathy. PSYCH: No history depression or anxiety; SLEEP: Normal sleeping pattern    PAST MEDICAL HISTORY:   1. Atherosclerotic Cardiovascular Disease:   • History of known CAD based on remote angiographic studies apparently 2005.  She was found to have evidence of nonflow limiting plaque involving several different vascular territories.  Patient apparently has been on intermittent antiplatelet therapy.  • History of discomfort involving lower extremities which is thought to reflect possible peripheral arterial disease though prior workup is not available.  2. History of hypertension with hypertensive heart disease.  Patient is not known to have secondary etiologies of hypertension.  She does have prior history of hematuria and has large renal cyst.  She does have prior history of adrenal gland adenoma and nephrolithiasis.  3. History of long-standing dyslipidemia for which patient was on statin which was discontinued for unspecified reason.  4. History of atrial fibrillation for which patient has multiple substrates.  There is no prior  history of stroke and patient has not been on anticoagulant therapy.  5. History of diffuse osteoarthrosis including right knee osteoarthrosis.  She also has history of hip pain and is known to have osteoporosis.  Patient is known to have fibromyalgia as well.  Patient has been under care of pain management clinic and has been on Lortab.  Patient apparently has history of headache and has been on when necessary naproxen therapy.  6. History of anemia.  She is known to have heme-positive stools and has undergone polypectomy under care of Dr. Aguilar.  She also has history of gastritis.  7. History of known underlying COPD with apparent respiratory failure.  Patient apparently has been on when necessary supplemental O2.  8. History of depression and anxiety state.  Patient has prior history of insomnia and has been on trazodone therapy.  9. History of hepatic and renal cysts.    PAST SURGICAL HISTORY:    1. History of hysterectomy  2. History of appendectomy    SOCIAL HISTORY: Patient is single and is living with her daughter and granddaughter.  Patient had smoked for several decades now.  She had worked as a hairdresser for about 30 years.    FAMILY HISTORY: Her mother apparently  complications of peripheral arterial disease.  Father lived to be 88.    PHYSICAL EXAMINATION:  GENERAL: Very pleasant adult female There were no vitals taken for this visit. There is no height or weight on file to calculate BMI. HEENT:  Head: Atraumatic, normocephalic, No tenderness over paranasal sinuses, external nares normal. No oral or nasal mucosal lesion. Sclera non-icteric ocular movements are normal with pupils reacting both to light and accommodations. Ocular fundi not seen. NECK: Carotid upstroke is normal, there are no carotid bruits, no JVD, no thyromegaly, no cervical or axillary lymphadenopathy. HEART: [place]ity beat is not displaced, no thrill is appreciated and both heart sounds are normal.  There is no murmur or rub audible.  BRONCHOPULMONARY: Patient has decreased air entry bilaterally with bronchovesicular sounds. No adventious sounds audible except for or crackles. GI & : Soft, no tenderness elicited and no viscera are palpable. Bowel sounds are positive and there is no renal bruits audible. EXTREMITIES:  There is no radio-femoral delay .  Distal vessels are poorly palpable and there are no femoral bruits audible. Patient does not have dependent edema. DERM: No skin rash. CNS: No gross motor neurological deficit. MUSCULOSKELETAL: No joint swelling notice and patient has normal range of motion in lower extremity.     [unfilled]  Wt Readings from Last 7 Encounters:   01/22/18 49.9 kg (110 lb)   01/22/18 49.9 kg (110 lb)   01/04/18 51.7 kg (114 lb)   12/26/17 52.6 kg (116 lb)   11/21/17 54.4 kg (120 lb)   11/17/17 54.9 kg (121 lb)   09/19/17 54.4 kg (120 lb)     LABS:     Results from last 7 days  Lab Units 01/22/18  1715   WBC 10*3/mm3 9.70   HEMOGLOBIN g/dL 13.9   HEMATOCRIT % 44.9   PLATELETS 10*3/mm3 233       Results from last 7 days  Lab Units 01/22/18  1716   SODIUM mmol/L 142   POTASSIUM mmol/L 3.7   CHLORIDE mmol/L 101   CO2 mmol/L 33.0*   BUN mg/dL 21*   CREATININE mg/dL 0.70   GLUCOSE mg/dL 175*   CALCIUM mg/dL 9.5       Results from last 7 days  Lab Units 01/22/18  1716   TROPONIN I ng/mL <0.012              Lab Results   Component Value Date    HGBA1C 6.20 (H) 01/09/2017           RADIOLOGY: Imaging Results (last 24 hours)     ** No results found for the last 24 hours. **          No Known Allergies       ASSESSMENT /PLAN:    1. Patient is 72 y.o. with risk profile for atherosclerotic cardiovascular disease as outlined previously, who presented emergency room on account of worsening of her baseline shortness of air and on subsequent telemetry she was found to have paroxysmal atrial fibrillation.  Patient does appear to have multiple substrates for dysrhythmias including hypertensive heart disease, underlying COPD as well as  pericardial effusion.  Pathophysiology of atrial dysrhythmia and its implications have been discussed with the patient extensively.  She would likely merit introduction of low-dose beta blocker therapy given known underlying CAD.  As for as potential for intramural thrombus and cerebral thromboembolism and stroke, patient does appear to have relatively low risk though if she is diabetic and she would likely merit introduction of long-term anticoagulant therapy preferably with newer and novel anticoagulant agents.  All these issues have been discussed with the patient and she would likely merit long-term monitoring to ascertain A. fib burden.  2. Patient was found to have small epicardial effusion with suggestion of inflammatory exudate which may reflect infectious versus inflammatory processes the given history of weight loss possibility of malignancy needs to be entertained she would likely merit complete echocardiographic studies as well as surveillance.  Clinically and echocardiographically there was no evidence of pericardial temponade.  All these issues have been discussed with the patient and her family extensively.  3. Patient has history of known CAD plaque based on remote angiographic studies.Pathogenesis of vascular plaques, their progression and especially potential for their rupture leading to vascular events including future potential for fatal and non-fatal myocardial infarction and cerebrovascular morbidity and mortality were discussed in details and strategies for reduction of cardiovascular events were emphasized as well.  Patient has had history of intermittent chest discomfort with both typical and atypical features but was found to have stable cardiac markers.  She would likely merit further workup which included scintigraphic or angiographic studies.  Patient would be strong candidate for lifelong antiplatelet therapy, beta blocker therapy and statin.  4. Patient has a significant shortness of  air for last several months which conceivably reflects COPD with rather frequent exacerbations.  She may merit assessment of LV and RV function and may merit CT scan of the PE protocol.  5. Need for absolute and prompt smoking cessation was discussed as patient remains at significant risk of sudden cardiac death secondary to plaque rupture. Multiple potential strategies for smoking cessation were discussed with the patient in detail patient was appraised of different options including nicotine replacement as well as potential pharmacotherapeutic options.   6. Other issues noted include history of chronic pain syndrome and history of multiple gastrointestinal issues.  She also appears to have rather gauze COPD with respiratory failure..                   In closing, I sincerely appreciate opportunity to participate in care of this patient. If I can be of any further assistance with the management of this patient, please don’t hesitate to contact me.    DORA TRAVIS M.D. [facility]

## 2018-01-24 LAB — BACTERIA SPEC AEROBE CULT: NORMAL

## 2018-01-30 ENCOUNTER — TELEPHONE (OUTPATIENT)
Dept: INTERNAL MEDICINE | Facility: CLINIC | Age: 73
End: 2018-01-30

## 2018-01-30 NOTE — TELEPHONE ENCOUNTER
PT WAS CONFUSED AS TO WHY SHE NEEDS TO SEE DR TRAVIS WITHOUT SEEING DR PRADHAN FIRST. I LET HER KNOW THAT WE CAN GET HER IN HERE BEFORE SHE HAS TO SEE ANGY. PT WAS OK WITH THAT SO I SCHEDULED HER.

## 2018-01-30 NOTE — TELEPHONE ENCOUNTER
Patient called and wants to speak with someone about some concerns she has from recent er visit and wants to now dr cooper opinion on dr gunn. Call her at [phone]

## 2018-02-07 ENCOUNTER — PATIENT OUTREACH (OUTPATIENT)
Dept: CASE MANAGEMENT | Facility: OTHER | Age: 73
End: 2018-02-07

## 2018-02-07 NOTE — OUTREACH NOTE
CA contacted patient for HRCM.  Explained role of CA and contact info given to patient.  Stated she is feeling better than she did when in the ER.  Stated she completed her medication given from ER.  Patient given phone number to the 24/7 Nurse Line ([phone]).  Assured patient knew of  Urgent Care location and phone number, in case needed.  Patient voiced clear understanding of MD appt dates, PCP this Fri., 2/9, and Dr. Siegel (Cardio) the next Fri., 2/16.  Reviewed Gaps in Care and need to schedule Annual Wellness Visit.  She said she would ask to get AWV scheduled while in the PCP office this week.  No questions or concerns voiced at his time.  Will continue to follow for HRCM, as needed.

## 2018-02-09 ENCOUNTER — OFFICE VISIT (OUTPATIENT)
Dept: INTERNAL MEDICINE | Facility: CLINIC | Age: 73
End: 2018-02-09

## 2018-02-09 VITALS
WEIGHT: 115 LBS | RESPIRATION RATE: 14 BRPM | HEIGHT: 65 IN | TEMPERATURE: 98.2 F | DIASTOLIC BLOOD PRESSURE: 60 MMHG | SYSTOLIC BLOOD PRESSURE: 110 MMHG | HEART RATE: 106 BPM | BODY MASS INDEX: 19.16 KG/M2 | OXYGEN SATURATION: 94 %

## 2018-02-09 DIAGNOSIS — I31.39 PERICARDIAL EFFUSION: ICD-10-CM

## 2018-02-09 DIAGNOSIS — E78.5 HYPERLIPIDEMIA, UNSPECIFIED HYPERLIPIDEMIA TYPE: Primary | ICD-10-CM

## 2018-02-09 DIAGNOSIS — F41.9 ANXIETY: ICD-10-CM

## 2018-02-09 DIAGNOSIS — I10 ESSENTIAL HYPERTENSION: ICD-10-CM

## 2018-02-09 DIAGNOSIS — I48.91 ATRIAL FIBRILLATION, UNSPECIFIED TYPE (HCC): ICD-10-CM

## 2018-02-09 DIAGNOSIS — M79.7 FIBROMYALGIA: ICD-10-CM

## 2018-02-09 DIAGNOSIS — E55.9 VITAMIN D DEFICIENCY: ICD-10-CM

## 2018-02-09 PROCEDURE — 99214 OFFICE O/P EST MOD 30 MIN: CPT | Performed by: INTERNAL MEDICINE

## 2018-02-09 RX ORDER — FLUTICASONE PROPIONATE 50 MCG
1 SPRAY, SUSPENSION (ML) NASAL DAILY
Qty: 1 BOTTLE | Refills: 6 | Status: SHIPPED | OUTPATIENT
Start: 2018-02-09 | End: 2019-02-19 | Stop reason: SDUPTHER

## 2018-02-09 RX ORDER — DULOXETIN HYDROCHLORIDE 60 MG/1
60 CAPSULE, DELAYED RELEASE ORAL 2 TIMES DAILY
Qty: 60 CAPSULE | Refills: 3 | Status: SHIPPED | OUTPATIENT
Start: 2018-02-09 | End: 2018-06-16 | Stop reason: SDUPTHER

## 2018-02-09 RX ORDER — CLONAZEPAM 1 MG/1
1 TABLET ORAL NIGHTLY PRN
Qty: 30 TABLET | Refills: 3 | Status: SHIPPED | OUTPATIENT
Start: 2018-02-09 | End: 2018-04-26 | Stop reason: SDUPTHER

## 2018-02-09 NOTE — PROGRESS NOTES
Subjective   Gabi Dudley is a 72 y.o. female.     Chief Complaint   Patient presents with   • Follow-up     discuss upcoming apt with Dr. Siegel       History of Present Illness    to emergency room for pneumonia desaturation in the hospital patient was discovered to have paroxysmal atrial fibrillation and also pericardial effusion.  Patient was seen by cardiologist doing well now denies any chest pain or chest pressure.  Patient does complain of pain all over mainly in the muscle area neck muscle showed the back leg on.  Patient has a fibromyalgia history. Patient self discontinued Neurontin in the past.  Patient is on Lortab by pain clinic.  Recently patient is stressed to the max due to illness.  Blood pressure stable on medication.  COPD patient occasional smoker still.    Current Outpatient Prescriptions:   •  albuterol (ACCUNEB) 1.25 MG/3ML nebulizer solution, Take 3 mL by nebulization Every 6 (Six) Hours As Needed for Wheezing., Disp: 30 vial, Rfl: 3  •  albuterol (PROVENTIL HFA;VENTOLIN HFA) 108 (90 Base) MCG/ACT inhaler, Inhale 2 puffs Every 4 (Four) Hours As Needed for Wheezing., Disp: 1 inhaler, Rfl: 5  •  clonazePAM (KlonoPIN) 1 MG tablet, Take 1 tablet by mouth 2 (Two) Times a Day As Needed for Seizures., Disp: 30 tablet, Rfl: 3  •  DULoxetine (CYMBALTA) 60 MG capsule, take 1 capsule by mouth once daily, Disp: 30 capsule, Rfl: 3  •  fluticasone (FLONASE) 50 MCG/ACT nasal spray, 1 spray into each nostril Daily., Disp: 1 each, Rfl: 11  •  HYDROcodone-acetaminophen (NORCO)  MG per tablet, take 1 tablet by mouth three times a day, Disp: , Rfl: 0  •  loratadine (CLARITIN) 10 MG tablet, Take  by mouth., Disp: , Rfl:   •  lovastatin (MEVACOR) 40 MG tablet, take 1 tablet by mouth at bedtime, Disp: 90 tablet, Rfl: 3  •  ondansetron ODT (ZOFRAN-ODT) 4 MG disintegrating tablet, Take 1 tablet by mouth Every 8 (Eight) Hours As Needed for Nausea or Vomiting., Disp: 15 tablet, Rfl: 1  •  pantoprazole  (PROTONIX) 40 MG EC tablet, Take 1 tablet by mouth Daily., Disp: 30 tablet, Rfl: 0  •  sertraline (ZOLOFT) 100 MG tablet, Take 1 tablet by mouth Daily., Disp: 30 tablet, Rfl: 5  •  traZODone (DESYREL) 50 MG tablet, take 1 to 2 tablets by mouth at bedtime if needed for insomnia, Disp: 60 tablet, Rfl: 2    The following portions of the patient's history were reviewed and updated as appropriate: allergies, current medications, past family history, past medical history, past social history, past surgical history and problem list.    Review of Systems   Constitutional: Negative.    Respiratory: Negative.    Cardiovascular: Positive for palpitations.   Gastrointestinal: Negative.    Musculoskeletal: Positive for arthralgias and myalgias.        Diffuse fibromyalgia tender points   Skin: Negative.    Neurological: Negative.    Psychiatric/Behavioral: Positive for agitation. The patient is nervous/anxious.         Depression       Objective   Physical Exam   Constitutional: She is oriented to person, place, and time. She appears well-developed and well-nourished.   Neck: Neck supple.   Cardiovascular: Normal rate, regular rhythm and normal heart sounds.    Pulmonary/Chest: Effort normal and breath sounds normal.   Abdominal: Bowel sounds are normal.   Musculoskeletal: She exhibits tenderness (fibromyalgia tender).   Neurological: She is alert and oriented to person, place, and time.   Skin: Skin is warm.   Psychiatric: She has a normal mood and affect.   Anxious and depressed       All tests have been reviewed.    Assessment/Plan   There are no diagnoses linked to this encounter.           tob, CT lung 2014, lung nodule, scarring and emphysema, repeat CT lung no change 1/2017  hematuria repeat normal  hyperglycemia, A1c 6.2 continue diet  vitD low continue vitD3 1000u daily  anxiety continue zoloft 100mg change to 50.   and klonopin 1mg daily prn , continue cymbalta 60,increase to 60 bid. refill klonopin prn. --  Weight  loss 6 Ib over 6 week due to viral infection  CT scan showed liver cyst, large kidney cyst, adrenal gland adenoma, kidney stones non-obstructing. repeat showed no change 11/2017  right knee OA on xray aleve prn knee brace help  hip pain s/p steroid helped improved  Hyperlipidemia stable on medicine continue  Hypertension no more low bp echo normal without med  Osteoporosis continue medication, refuse dexa   heme+, Colon polyps repeat 6/2019. EGD done  gastritis continue protonix  COPD continue medication. need to Quit tobacco. tudorsa, proair, continue O2 prn  Insomnia continue trazodone 100mg refill  Fibromyalgia stable watch continue medicine and follow up with pain clinic,with lortab, continue followup with pain clinic patient is going to discuss with pain doctor regarding Neurontin or increase of clonazepam  VacUTD, pneumovax again 9/14/17, in pharmacy  Tension HA continue flexeril and naproxen  allergy flonase continue  sinus polyp per dentist, obtain rec.    Nipple retraction mamm and US:negative pathology  tob to quit,   Cysts in liver and kidney, watch  A.fib and pericardia eff, follow cardio  follow up in 1 mo

## 2018-03-16 ENCOUNTER — OFFICE VISIT (OUTPATIENT)
Dept: INTERNAL MEDICINE | Facility: CLINIC | Age: 73
End: 2018-03-16

## 2018-03-16 VITALS
HEIGHT: 65 IN | SYSTOLIC BLOOD PRESSURE: 94 MMHG | HEART RATE: 72 BPM | DIASTOLIC BLOOD PRESSURE: 60 MMHG | OXYGEN SATURATION: 95 % | WEIGHT: 108.8 LBS | TEMPERATURE: 97 F | BODY MASS INDEX: 18.13 KG/M2

## 2018-03-16 DIAGNOSIS — I48.91 ATRIAL FIBRILLATION, UNSPECIFIED TYPE (HCC): ICD-10-CM

## 2018-03-16 DIAGNOSIS — M81.0 OSTEOPOROSIS, UNSPECIFIED OSTEOPOROSIS TYPE, UNSPECIFIED PATHOLOGICAL FRACTURE PRESENCE: ICD-10-CM

## 2018-03-16 DIAGNOSIS — F41.9 ANXIETY: ICD-10-CM

## 2018-03-16 DIAGNOSIS — E55.9 VITAMIN D DEFICIENCY: ICD-10-CM

## 2018-03-16 DIAGNOSIS — M17.9 OSTEOARTHRITIS OF KNEE, UNSPECIFIED LATERALITY, UNSPECIFIED OSTEOARTHRITIS TYPE: ICD-10-CM

## 2018-03-16 DIAGNOSIS — D35.00 ADRENAL ADENOMA, UNSPECIFIED LATERALITY: ICD-10-CM

## 2018-03-16 DIAGNOSIS — E78.5 HYPERLIPIDEMIA, UNSPECIFIED HYPERLIPIDEMIA TYPE: Primary | ICD-10-CM

## 2018-03-16 DIAGNOSIS — M79.7 FIBROMYALGIA: ICD-10-CM

## 2018-03-16 DIAGNOSIS — I10 ESSENTIAL HYPERTENSION: ICD-10-CM

## 2018-03-16 DIAGNOSIS — G47.00 INSOMNIA, UNSPECIFIED TYPE: ICD-10-CM

## 2018-03-16 DIAGNOSIS — N28.89 RENAL MASS: ICD-10-CM

## 2018-03-16 DIAGNOSIS — J43.9 PULMONARY EMPHYSEMA, UNSPECIFIED EMPHYSEMA TYPE (HCC): ICD-10-CM

## 2018-03-16 PROCEDURE — 99214 OFFICE O/P EST MOD 30 MIN: CPT | Performed by: INTERNAL MEDICINE

## 2018-03-16 RX ORDER — ATORVASTATIN CALCIUM 20 MG/1
20 TABLET, FILM COATED ORAL DAILY
Refills: 0 | COMMUNITY
Start: 2018-03-06 | End: 2019-04-29 | Stop reason: SDUPTHER

## 2018-03-16 NOTE — PROGRESS NOTES
Subjective   Gabi Dudley is a 73 y.o. female.     Chief Complaint   Patient presents with   • Hyperlipidemia     1mo f/u   • Other     Patient also following up on cardiology, stress test results.   • Sleep Apnea     f/u       History of Present Illness   Patient here for follow-up.  Anxiety stable on medication.  Patient still has some anxiousness even though.  CT scan from the emergency room showed kidney mass need a further workup.  Hyperlipidemia on medication stable.  Blood pressure low normal without medications.  COPD encourage patient to quit smoking.  Patient denies any significant short of breath.  Fibromyalgia improved after increase Cymbalta.  Atrial fibrillation pericardial effusion patient is seeing cardiologist.  Sleep apnea questionnaire by cardiologist patient is seeing cardiologist for that.    Current Outpatient Prescriptions:   •  albuterol (ACCUNEB) 1.25 MG/3ML nebulizer solution, Take 3 mL by nebulization Every 6 (Six) Hours As Needed for Wheezing., Disp: 30 vial, Rfl: 3  •  albuterol (PROVENTIL HFA;VENTOLIN HFA) 108 (90 Base) MCG/ACT inhaler, Inhale 2 puffs Every 4 (Four) Hours As Needed for Wheezing., Disp: 1 inhaler, Rfl: 5  •  clonazePAM (KlonoPIN) 1 MG tablet, Take 1 tablet by mouth At Night As Needed for Seizures., Disp: 30 tablet, Rfl: 3  •  DULoxetine (CYMBALTA) 60 MG capsule, Take 1 capsule by mouth 2 (Two) Times a Day., Disp: 60 capsule, Rfl: 3  •  fluticasone (FLONASE) 50 MCG/ACT nasal spray, 1 spray into each nostril Daily., Disp: 1 bottle, Rfl: 6  •  HYDROcodone-acetaminophen (NORCO)  MG per tablet, take 1 tablet by mouth three times a day, Disp: , Rfl: 0  •  loratadine (CLARITIN) 10 MG tablet, Take  by mouth., Disp: , Rfl:   •  ondansetron ODT (ZOFRAN-ODT) 4 MG disintegrating tablet, Take 1 tablet by mouth Every 8 (Eight) Hours As Needed for Nausea or Vomiting., Disp: 15 tablet, Rfl: 1  •  pantoprazole (PROTONIX) 40 MG EC tablet, Take 1 tablet by mouth Daily., Disp: 30  tablet, Rfl: 0  •  sertraline (ZOLOFT) 50 MG tablet, Take 1 tablet by mouth Daily., Disp: 30 tablet, Rfl: 3  •  traZODone (DESYREL) 50 MG tablet, take 1 to 2 tablets by mouth at bedtime if needed for insomnia, Disp: 60 tablet, Rfl: 2  •  atorvastatin (LIPITOR) 20 MG tablet, Take 20 mg by mouth Daily., Disp: , Rfl: 0    The following portions of the patient's history were reviewed and updated as appropriate: allergies, current medications, past family history, past medical history, past social history, past surgical history and problem list.    Review of Systems   Constitutional: Negative.    Respiratory: Negative.    Cardiovascular: Negative.    Gastrointestinal: Negative.    Musculoskeletal: Positive for arthralgias.   Skin: Negative.    Neurological: Negative.    Psychiatric/Behavioral: Negative.        Objective   Physical Exam   Constitutional: She is oriented to person, place, and time. She appears well-nourished.   Neck: Neck supple.   Cardiovascular: Normal rate, regular rhythm and normal heart sounds.    Pulmonary/Chest: Effort normal and breath sounds normal.   Abdominal: Bowel sounds are normal.   Musculoskeletal: She exhibits tenderness.   Neurological: She is alert and oriented to person, place, and time.   Skin: Skin is warm.   Psychiatric: She has a normal mood and affect.       All tests have been reviewed.    Assessment/Plan   There are no diagnoses linked to this encounter.               tob, CT lung 2014, lung nodule, scarring and emphysema,  CT lung no change 1/2018  hematuria repeat normal  hyperglycemia, A1c 6.2 continue diet  vitD low continue vitD3 1000u daily  anxiety continue zoloft 50.   and klonopin 1mg daily prn , continue cymbalta 60 bid.   CT scan showed liver cyst, large kidney cyst, adrenal gland adenoma, kidney stones non-obstructing. repeat showed no change 1/2018  right knee OA on xray aleve prn knee brace help  hip pain s/p steroid helped improved  Hyperlipidemia stable on  medicine continue  Hypertension no more low bp echo normal without med, watch  Osteoporosis continue medication, refuse dexa   heme+, Colon polyps repeat 6/2019. EGD done  gastritis continue protonix  COPD continue medication. need to Quit tobacco. tudorsa, proair, continue O2 prn,   Insomnia continue trazodone 100mg refill  Fibromyalgia stable watch continue medicine and follow up with pain clinic,with lortab, continue followup with pain clinic  increased cymbalta helps--  VacUTD, pneumovax again 9/14/17, in pharmacy  Tension HA continue flexeril and naproxen  allergy flonase continue  sinus polyp per dentist, obtain rec.    Nipple retraction mamm and US:negative pathology  tob to quit,   Cysts in liver and kidney, watch  Kidney lesion do renal image CT renal protocol--  A.fib and pericardia eff, follow cardio  follow up in 1 mo medicare [employer]

## 2018-04-04 ENCOUNTER — HOSPITAL ENCOUNTER (OUTPATIENT)
Dept: CT IMAGING | Facility: HOSPITAL | Age: 73
Discharge: HOME | End: 2018-04-04
Attending: INTERNAL MEDICINE | Admitting: INTERNAL MEDICINE

## 2018-04-04 LAB — CREAT BLDA-MCNC: 0.8 MG/DL (ref 0.6–1.3)

## 2018-04-04 PROCEDURE — 0 IOPAMIDOL PER 1 ML: Performed by: INTERNAL MEDICINE

## 2018-04-04 PROCEDURE — 74170 CT ABD WO CNTRST FLWD CNTRST: CPT

## 2018-04-04 PROCEDURE — 82565 ASSAY OF CREATININE: CPT

## 2018-04-04 RX ADMIN — IOPAMIDOL 95 ML: 755 INJECTION, SOLUTION INTRAVENOUS at 13:25

## 2018-04-26 ENCOUNTER — OFFICE VISIT (OUTPATIENT)
Dept: INTERNAL MEDICINE | Facility: CLINIC | Age: 73
End: 2018-04-26

## 2018-04-26 VITALS
HEART RATE: 70 BPM | SYSTOLIC BLOOD PRESSURE: 122 MMHG | HEIGHT: 65 IN | OXYGEN SATURATION: 94 % | BODY MASS INDEX: 18.49 KG/M2 | TEMPERATURE: 97.6 F | WEIGHT: 111 LBS | RESPIRATION RATE: 14 BRPM | DIASTOLIC BLOOD PRESSURE: 60 MMHG

## 2018-04-26 DIAGNOSIS — K57.90 DIVERTICULOSIS OF INTESTINE WITHOUT BLEEDING, UNSPECIFIED INTESTINAL TRACT LOCATION: ICD-10-CM

## 2018-04-26 DIAGNOSIS — M81.0 OSTEOPOROSIS, UNSPECIFIED OSTEOPOROSIS TYPE, UNSPECIFIED PATHOLOGICAL FRACTURE PRESENCE: ICD-10-CM

## 2018-04-26 DIAGNOSIS — E55.9 VITAMIN D DEFICIENCY: ICD-10-CM

## 2018-04-26 DIAGNOSIS — E78.5 HYPERLIPIDEMIA, UNSPECIFIED HYPERLIPIDEMIA TYPE: Primary | ICD-10-CM

## 2018-04-26 DIAGNOSIS — M17.9 OSTEOARTHRITIS OF KNEE, UNSPECIFIED LATERALITY, UNSPECIFIED OSTEOARTHRITIS TYPE: ICD-10-CM

## 2018-04-26 DIAGNOSIS — J20.9 ACUTE BRONCHITIS, UNSPECIFIED ORGANISM: ICD-10-CM

## 2018-04-26 DIAGNOSIS — I10 ESSENTIAL HYPERTENSION: ICD-10-CM

## 2018-04-26 DIAGNOSIS — F41.9 ANXIETY: ICD-10-CM

## 2018-04-26 DIAGNOSIS — G47.00 INSOMNIA, UNSPECIFIED TYPE: ICD-10-CM

## 2018-04-26 DIAGNOSIS — M79.7 FIBROMYALGIA: ICD-10-CM

## 2018-04-26 DIAGNOSIS — D35.00 ADRENAL ADENOMA, UNSPECIFIED LATERALITY: ICD-10-CM

## 2018-04-26 DIAGNOSIS — J43.9 PULMONARY EMPHYSEMA, UNSPECIFIED EMPHYSEMA TYPE (HCC): ICD-10-CM

## 2018-04-26 DIAGNOSIS — R53.83 OTHER FATIGUE: ICD-10-CM

## 2018-04-26 DIAGNOSIS — N28.1 SIMPLE RENAL CYST: ICD-10-CM

## 2018-04-26 PROCEDURE — G0439 PPPS, SUBSEQ VISIT: HCPCS | Performed by: INTERNAL MEDICINE

## 2018-04-26 RX ORDER — TRAZODONE HYDROCHLORIDE 50 MG/1
50 TABLET ORAL NIGHTLY
Qty: 60 TABLET | Refills: 2 | Status: SHIPPED | OUTPATIENT
Start: 2018-04-26 | End: 2019-03-18 | Stop reason: SDUPTHER

## 2018-04-26 RX ORDER — BUDESONIDE AND FORMOTEROL FUMARATE DIHYDRATE 160; 4.5 UG/1; UG/1
2 AEROSOL RESPIRATORY (INHALATION)
Qty: 1 INHALER | Refills: 12 | Status: SHIPPED | OUTPATIENT
Start: 2018-04-26 | End: 2019-04-29 | Stop reason: SDUPTHER

## 2018-04-26 RX ORDER — ALBUTEROL SULFATE 1.25 MG/3ML
1 SOLUTION RESPIRATORY (INHALATION) EVERY 6 HOURS PRN
Qty: 30 VIAL | Refills: 3 | Status: SHIPPED | OUTPATIENT
Start: 2018-04-26 | End: 2018-05-30 | Stop reason: SDUPTHER

## 2018-04-26 RX ORDER — CLONAZEPAM 1 MG/1
1 TABLET ORAL NIGHTLY PRN
Qty: 30 TABLET | Refills: 3 | Status: SHIPPED | OUTPATIENT
Start: 2018-04-26 | End: 2018-11-17 | Stop reason: SDUPTHER

## 2018-04-26 RX ORDER — ALBUTEROL SULFATE 90 UG/1
2 AEROSOL, METERED RESPIRATORY (INHALATION) EVERY 4 HOURS PRN
Qty: 1 INHALER | Refills: 5 | Status: SHIPPED | OUTPATIENT
Start: 2018-04-26 | End: 2020-09-08

## 2018-04-26 NOTE — PROGRESS NOTES
QUICK REFERENCE INFORMATION:  The ABCs of the Annual Wellness Visit    Subsequent Medicare Wellness Visit    HEALTH RISK ASSESSMENT    1945    Recent Hospitalizations:  No hospitalization(s) within the last year..        Current Medical Providers:  Patient Care Team:  Paul Quinteros MD as PCP - General  Paul Quinteros MD as PCP - Family Medicine  Akiko Polo MD as PCP - Claims Attributed  Ioana Garrido RN as Care Coordinator (Population Health)        Smoking Status:  History   Smoking Status   • Former Smoker   • Types: Cigarettes   Smokeless Tobacco   • Never Used     Comment: using nicotin patches       Alcohol Consumption:  History   Alcohol Use No     Comment: glass of wine once/twice a year       Depression Screen:   PHQ-2/PHQ-9 Depression Screening 12/20/2016   Little interest or pleasure in doing things 0   Feeling down, depressed, or hopeless 1   Trouble falling or staying asleep, or sleeping too much 0   Feeling tired or having little energy 0   Poor appetite or overeating 0   Feeling bad about yourself - or that you are a failure or have let yourself or your family down 0   Trouble concentrating on things, such as reading the newspaper or watching television 0   Moving or speaking so slowly that other people could have noticed. Or the opposite - being so fidgety or restless that you have been moving around a lot more than usual 0   Thoughts that you would be better off dead, or of hurting yourself in some way 0   Total Score 1   If you checked off any problems, how difficult have these problems made it for you to do your work, take care of things at home, or get along with other people? Not difficult at all       Health Habits and Functional and Cognitive Screening:  Functional & Cognitive Status 12/20/2016   Do you have difficulty preparing food and eating? No   Do you have difficulty bathing yourself, getting dressed or grooming yourself? No   Do you have difficulty using the toilet? No   Do  you have difficulty moving around from place to place? No   In the past year have you fallen or experienced a near fall? No   Do you need help using the phone?  No   Are you deaf or do you have serious difficulty hearing?  No   Do you need help with transportation? No   Do you need help shopping? No   Do you need help preparing meals?  No   Do you need help with housework?  No   Do you need help with laundry? No   Do you need help taking your medications? No   Do you need help managing money? No   Do you have difficulty concentrating, remembering or making decisions? No           Does the patient have evidence of cognitive impairment? No    Aspirin use counseling: Taking ASA appropriately as indicated      Recent Lab Results:  CMP:  Lab Results   Component Value Date    GLU 93 11/17/2017    BUN 21 (H) 01/22/2018    CREATININE 0.80 04/04/2018    EGFRIFNONA 83 01/22/2018    EGFRIFAFRI 74 11/17/2017    BCR 30.0 (H) 01/22/2018     01/22/2018    K 3.7 01/22/2018    CO2 33.0 (H) 01/22/2018    CALCIUM 9.5 01/22/2018    PROTENTOTREF 7.3 11/17/2017    ALBUMIN 3.60 01/22/2018    LABGLOBREF 2.9 11/17/2017    LABIL2 1.1 01/22/2018    BILITOT 0.4 01/22/2018    ALKPHOS 77 01/22/2018    AST 17 01/22/2018    ALT 17 01/22/2018     Lipid Panel:  Lab Results   Component Value Date    CHOL 177 01/09/2017    TRIG 74 01/09/2017    HDL 66 (H) 01/09/2017     HbA1c:  Lab Results   Component Value Date    HGBA1C 6.20 (H) 01/09/2017       Visual Acuity:  No exam data present    Age-appropriate Screening Schedule:  Refer to the list below for future screening recommendations based on patient's age, sex and/or medical conditions. Orders for these recommended tests are listed in the plan section. The patient has been provided with a written plan.    Health Maintenance   Topic Date Due   • PNEUMOCOCCAL VACCINES (65+ LOW/MEDIUM RISK) (2 of 2 - PPSV23) 10/12/2013   • LIPID PANEL  01/09/2018   • MAMMOGRAM  05/17/2018   • INFLUENZA VACCINE   08/01/2018   • DXA SCAN  01/17/2019   • TDAP/TD VACCINES (2 - Td) 02/02/2020   • COLONOSCOPY  06/21/2026   • ZOSTER VACCINE  Completed        Subjective   History of Present Illness    Gabi Dudley is a 73 y.o. female who presents for an Subsequent Wellness Visit.Patient here for Medicare wellness.  Anxiety stable medication.  Hyperglycemia on diet.  Vitamin D low on supplement stable.  Hyperlipidemia stable medication.  Hypertension stable without medication.  COPD patient still smokes patient is not taking any inhaler self discontinued.  Fibromyalgia patient is seeing pain doctor on pain medicine.  Atrial fibrillation stable left adrenal tumor benign on x-ray.  Patient also has liver cyst and kidney cysts and nonobstructing kidney stone.    The following portions of the patient's history were reviewed and updated as appropriate: allergies, current medications, past family history, past medical history, past social history, past surgical history and problem list.    Outpatient Medications Prior to Visit   Medication Sig Dispense Refill   • albuterol (ACCUNEB) 1.25 MG/3ML nebulizer solution Take 3 mL by nebulization Every 6 (Six) Hours As Needed for Wheezing. 30 vial 3   • albuterol (PROVENTIL HFA;VENTOLIN HFA) 108 (90 Base) MCG/ACT inhaler Inhale 2 puffs Every 4 (Four) Hours As Needed for Wheezing. 1 inhaler 5   • atorvastatin (LIPITOR) 20 MG tablet Take 20 mg by mouth Daily.  0   • clonazePAM (KlonoPIN) 1 MG tablet Take 1 tablet by mouth At Night As Needed for Seizures. 30 tablet 3   • DULoxetine (CYMBALTA) 60 MG capsule Take 1 capsule by mouth 2 (Two) Times a Day. 60 capsule 3   • fluticasone (FLONASE) 50 MCG/ACT nasal spray 1 spray into each nostril Daily. 1 bottle 6   • HYDROcodone-acetaminophen (NORCO)  MG per tablet take 1 tablet by mouth three times a day  0   • loratadine (CLARITIN) 10 MG tablet Take  by mouth.     • ondansetron ODT (ZOFRAN-ODT) 4 MG disintegrating tablet Take 1 tablet by mouth  Every 8 (Eight) Hours As Needed for Nausea or Vomiting. 15 tablet 1   • pantoprazole (PROTONIX) 40 MG EC tablet Take 1 tablet by mouth Daily. 30 tablet 0   • sertraline (ZOLOFT) 50 MG tablet Take 1 tablet by mouth Daily. 30 tablet 3   • traZODone (DESYREL) 50 MG tablet take 1 to 2 tablets by mouth at bedtime if needed for insomnia 60 tablet 2     No facility-administered medications prior to visit.        Patient Active Problem List   Diagnosis   • Adrenal adenoma   • Anxiety   • Fatigue   • Fibromyalgia   • Hyperlipidemia   • Hypertension   • Insomnia   • Osteoarthritis of knee   • Osteoporosis   • Pulmonary emphysema   • Simple renal cyst   • Tension headache   • Vitamin D deficiency   • Diverticulosis   • Inversion of nipple   • Atrial fibrillation   • Pericardial effusion       Advance Care Planning:  has NO advance directive - information provided to the patient today    Identification of Risk Factors:  Risk factors include: inactivity.    Review of Systems   Constitutional: Negative.    Respiratory: Negative.    Cardiovascular: Negative.    Gastrointestinal: Negative.    Musculoskeletal: Negative.    Skin: Negative.    Neurological: Negative.    Psychiatric/Behavioral: Negative.        Compared to one year ago, the patient feels her physical health is the same.  Compared to one year ago, the patient feels her mental health is the same.    Objective     Physical Exam   Constitutional: She is oriented to person, place, and time. She appears well-nourished.   Neck: Neck supple.   Cardiovascular: Normal rate, regular rhythm and normal heart sounds.    Pulmonary/Chest: Effort normal. She has wheezes.   Abdominal: Bowel sounds are normal.   Neurological: She is alert and oriented to person, place, and time.   Skin: Skin is warm.   Psychiatric: She has a normal mood and affect.       Vitals:    04/26/18 1343   BP: 122/60   Pulse: 70   Resp: 14   Temp: 97.6 °F (36.4 °C)   SpO2: 94%   Weight: 50.3 kg (111 lb)   Height:  "165.1 cm (65\")       Patient's Body mass index is 18.47 kg/m². BMI is within normal parameters. No follow-up required.      Assessment/Plan   Patient Self-Management and Personalized Health Advice  The patient has been provided with information about: diet and exercise and preventive services including:   · Advance directive.    Visit Diagnoses:    ICD-10-CM ICD-9-CM   1. Acute bronchitis, unspecified organism J20.9 466.0       No orders of the defined types were placed in this encounter.      Outpatient Encounter Prescriptions as of 4/26/2018   Medication Sig Dispense Refill   • albuterol (ACCUNEB) 1.25 MG/3ML nebulizer solution Take 3 mL by nebulization Every 6 (Six) Hours As Needed for Wheezing. 30 vial 3   • albuterol (PROVENTIL HFA;VENTOLIN HFA) 108 (90 Base) MCG/ACT inhaler Inhale 2 puffs Every 4 (Four) Hours As Needed for Wheezing. 1 inhaler 5   • atorvastatin (LIPITOR) 20 MG tablet Take 20 mg by mouth Daily.  0   • clonazePAM (KlonoPIN) 1 MG tablet Take 1 tablet by mouth At Night As Needed for Seizures. 30 tablet 3   • DULoxetine (CYMBALTA) 60 MG capsule Take 1 capsule by mouth 2 (Two) Times a Day. 60 capsule 3   • fluticasone (FLONASE) 50 MCG/ACT nasal spray 1 spray into each nostril Daily. 1 bottle 6   • HYDROcodone-acetaminophen (NORCO)  MG per tablet take 1 tablet by mouth three times a day  0   • loratadine (CLARITIN) 10 MG tablet Take  by mouth.     • ondansetron ODT (ZOFRAN-ODT) 4 MG disintegrating tablet Take 1 tablet by mouth Every 8 (Eight) Hours As Needed for Nausea or Vomiting. 15 tablet 1   • pantoprazole (PROTONIX) 40 MG EC tablet Take 1 tablet by mouth Daily. 30 tablet 0   • sertraline (ZOLOFT) 50 MG tablet Take 1 tablet by mouth Daily. 30 tablet 3   • traZODone (DESYREL) 50 MG tablet take 1 to 2 tablets by mouth at bedtime if needed for insomnia 60 tablet 2     No facility-administered encounter medications on file as of 4/26/2018.        Reviewed use of high risk medication in the " elderly: yes  Reviewed for potential of harmful drug interactions in the elderly: no    Follow Up:  No Follow-up on file.     An After Visit Summary and PPPS with all of these plans were given to the patient.         tob, CT lung 2014, lung nodule, scarring and emphysema,  CT lung no change 1/2018  hyperglycemia, A1c 6.2 continue diet  vitD low continue vitD3 1000u daily  anxiety continue zoloft 50.   and klonopin 1mg daily prn , continue cymbalta 60 bid.   CT scan showed liver cyst, large kidney cyst, adrenal gland adenoma, kidney stones non-obstructing. repeat showed no change 1/2018  right knee OA on xray aleve prn knee brace help  hip pain s/p steroid helped improved  Hyperlipidemia stable on medicine continue  Hypertension no more low bp echo normal without med, watch  Osteoporosis continue medication, refuse dexa   heme+, Colon polyps repeat 6/2019. EGD done  gastritis continue protonix  COPD continue medication. need to Quit tobacco. tudorsa, proair, continue O2 prn, restart symbicort   Insomnia continue trazodone 100mg refill  Fibromyalgia stable watch continue medicine and follow up with pain clinic,with lortab, continue followup with pain clinic  VacUTD, pneumovax again 9/14/17, in pharmacy  Tension HA continue flexeril and naproxen  allergy flonase continue  sinus polyp per dentist, obtain rec.    Nipple retraction mamm and US:negative pathology  tob to quit,   Cysts in liver and kidney, watch  Kidney lesion renal image CT renal protocol cyst and stone non obstructing  A.fib and pericardia eff, follow cardio  Left adrenal tu benign per radilogist   Liver cyst on CT watch      3weeks after labs

## 2018-05-22 LAB
25(OH)D3+25(OH)D2 SERPL-MCNC: 23.5 NG/ML
ALBUMIN SERPL-MCNC: 3.8 G/DL (ref 3.5–5)
ALBUMIN/GLOB SERPL: 1.5 G/DL (ref 1–2)
ALP SERPL-CCNC: 110 U/L (ref 38–126)
ALT SERPL-CCNC: 25 U/L (ref 13–69)
APPEARANCE UR: ABNORMAL
AST SERPL-CCNC: 25 U/L (ref 15–46)
BACTERIA #/AREA URNS HPF: ABNORMAL /HPF
BASOPHILS # BLD AUTO: 0.07 10*3/MM3 (ref 0–0.2)
BASOPHILS NFR BLD AUTO: 0.8 % (ref 0–2.5)
BILIRUB SERPL-MCNC: 0.3 MG/DL (ref 0.2–1.3)
BILIRUB UR QL STRIP: ABNORMAL
BUN SERPL-MCNC: 19 MG/DL (ref 7–20)
BUN/CREAT SERPL: 31.7 (ref 7.1–23.5)
CALCIUM SERPL-MCNC: 8.9 MG/DL (ref 8.4–10.2)
CHLORIDE SERPL-SCNC: 102 MMOL/L (ref 98–107)
CHOLEST SERPL-MCNC: 137 MG/DL (ref 0–199)
CO2 SERPL-SCNC: 31 MMOL/L (ref 26–30)
COLOR UR: ABNORMAL
CREAT SERPL-MCNC: 0.6 MG/DL (ref 0.6–1.3)
CRYSTALS URNS MICRO: ABNORMAL
EOSINOPHIL # BLD AUTO: 0.11 10*3/MM3 (ref 0–0.7)
EOSINOPHIL NFR BLD AUTO: 1.3 % (ref 0–7)
EPI CELLS #/AREA URNS HPF: ABNORMAL /HPF
ERYTHROCYTE [DISTWIDTH] IN BLOOD BY AUTOMATED COUNT: 15.6 % (ref 11.5–14.5)
GFR SERPLBLD CREATININE-BSD FMLA CKD-EPI: 119 ML/MIN/1.73
GFR SERPLBLD CREATININE-BSD FMLA CKD-EPI: 99 ML/MIN/1.73
GLOBULIN SER CALC-MCNC: 2.6 GM/DL
GLUCOSE SERPL-MCNC: 94 MG/DL (ref 74–98)
GLUCOSE UR QL: NEGATIVE
HCT VFR BLD AUTO: 46 % (ref 37–47)
HCV AB S/CO SERPL IA: <0.1 S/CO RATIO (ref 0–0.9)
HDLC SERPL-MCNC: 70 MG/DL (ref 40–60)
HGB BLD-MCNC: 14.4 G/DL (ref 12–16)
HGB UR QL STRIP: NEGATIVE
IMM GRANULOCYTES # BLD: 0.02 10*3/MM3 (ref 0–0.06)
IMM GRANULOCYTES NFR BLD: 0.2 % (ref 0–0.6)
KETONES UR QL STRIP: ABNORMAL
LDLC SERPL CALC-MCNC: 51 MG/DL (ref 0–99)
LEUKOCYTE ESTERASE UR QL STRIP: ABNORMAL
LYMPHOCYTES # BLD AUTO: 2.49 10*3/MM3 (ref 0.6–3.4)
LYMPHOCYTES NFR BLD AUTO: 29.5 % (ref 10–50)
MCH RBC QN AUTO: 28.4 PG (ref 27–31)
MCHC RBC AUTO-ENTMCNC: 31.3 G/DL (ref 30–37)
MCV RBC AUTO: 90.7 FL (ref 81–99)
MONOCYTES # BLD AUTO: 0.42 10*3/MM3 (ref 0–0.9)
MONOCYTES NFR BLD AUTO: 5 % (ref 0–12)
MUCOUS THREADS URNS QL MICRO: ABNORMAL /HPF
NEUTROPHILS # BLD AUTO: 5.34 10*3/MM3 (ref 2–6.9)
NEUTROPHILS NFR BLD AUTO: 63.2 % (ref 37–80)
NITRITE UR QL STRIP: NEGATIVE
NRBC BLD AUTO-RTO: 0 /100 WBC (ref 0–0)
PH UR STRIP: 6.5 [PH] (ref 5–8)
PLATELET # BLD AUTO: 232 10*3/MM3 (ref 130–400)
POTASSIUM SERPL-SCNC: 4.1 MMOL/L (ref 3.5–5.1)
PROT SERPL-MCNC: 6.4 G/DL (ref 6.3–8.2)
PROT UR QL STRIP: ABNORMAL
RBC # BLD AUTO: 5.07 10*6/MM3 (ref 4.2–5.4)
RBC #/AREA URNS HPF: ABNORMAL /HPF
SODIUM SERPL-SCNC: 141 MMOL/L (ref 137–145)
SP GR UR: 1.02 (ref 1–1.03)
TRIGL SERPL-MCNC: 82 MG/DL
TSH SERPL DL<=0.005 MIU/L-ACNC: 2.22 MIU/ML (ref 0.47–4.68)
UROBILINOGEN UR STRIP-MCNC: ABNORMAL MG/DL
VLDLC SERPL CALC-MCNC: 16.4 MG/DL
WBC # BLD AUTO: 8.45 10*3/MM3 (ref 4.8–10.8)
WBC #/AREA URNS HPF: ABNORMAL /HPF

## 2018-05-22 RX ORDER — CEPHALEXIN 500 MG/1
500 CAPSULE ORAL 3 TIMES DAILY
Qty: 21 CAPSULE | Refills: 0 | Status: SHIPPED | OUTPATIENT
Start: 2018-05-22 | End: 2019-04-29

## 2018-05-24 ENCOUNTER — OFFICE VISIT (OUTPATIENT)
Dept: INTERNAL MEDICINE | Facility: CLINIC | Age: 73
End: 2018-05-24

## 2018-05-24 VITALS
SYSTOLIC BLOOD PRESSURE: 122 MMHG | OXYGEN SATURATION: 95 % | HEART RATE: 46 BPM | WEIGHT: 111 LBS | RESPIRATION RATE: 14 BRPM | TEMPERATURE: 97.1 F | HEIGHT: 65 IN | BODY MASS INDEX: 18.49 KG/M2 | DIASTOLIC BLOOD PRESSURE: 80 MMHG

## 2018-05-24 DIAGNOSIS — M79.7 FIBROMYALGIA: ICD-10-CM

## 2018-05-24 DIAGNOSIS — I48.91 ATRIAL FIBRILLATION, UNSPECIFIED TYPE (HCC): ICD-10-CM

## 2018-05-24 DIAGNOSIS — J43.9 PULMONARY EMPHYSEMA, UNSPECIFIED EMPHYSEMA TYPE (HCC): ICD-10-CM

## 2018-05-24 DIAGNOSIS — E78.5 HYPERLIPIDEMIA, UNSPECIFIED HYPERLIPIDEMIA TYPE: Primary | ICD-10-CM

## 2018-05-24 DIAGNOSIS — G44.209 TENSION HEADACHE: ICD-10-CM

## 2018-05-24 DIAGNOSIS — F41.9 ANXIETY: ICD-10-CM

## 2018-05-24 DIAGNOSIS — M17.9 OSTEOARTHRITIS OF KNEE, UNSPECIFIED LATERALITY, UNSPECIFIED OSTEOARTHRITIS TYPE: ICD-10-CM

## 2018-05-24 DIAGNOSIS — I31.39 PERICARDIAL EFFUSION: ICD-10-CM

## 2018-05-24 DIAGNOSIS — I10 ESSENTIAL HYPERTENSION: ICD-10-CM

## 2018-05-24 PROCEDURE — 99214 OFFICE O/P EST MOD 30 MIN: CPT | Performed by: INTERNAL MEDICINE

## 2018-05-24 NOTE — PROGRESS NOTES
Subjective   Gabi Dudley is a 73 y.o. female.     Chief Complaint   Patient presents with   • Follow-up       History of Present Illness   Patient here for follow-up.  The hyperlipidemia stable on medication.  Hypertension stable  on diet.  COPD stable medication now breathing improved.  No short of breath.  Vitamin D low on supplemented.  Anxiety stable on medication.  Osteoporosis on medication stable.  Patient still smokes.  H of fibrillation stable now.    Current Outpatient Prescriptions:   •  albuterol (ACCUNEB) 1.25 MG/3ML nebulizer solution, Take 3 mL by nebulization Every 6 (Six) Hours As Needed for Wheezing., Disp: 30 vial, Rfl: 3  •  albuterol (PROVENTIL HFA;VENTOLIN HFA) 108 (90 Base) MCG/ACT inhaler, Inhale 2 puffs Every 4 (Four) Hours As Needed for Wheezing., Disp: 1 inhaler, Rfl: 5  •  atorvastatin (LIPITOR) 20 MG tablet, Take 20 mg by mouth Daily., Disp: , Rfl: 0  •  budesonide-formoterol (SYMBICORT) 160-4.5 MCG/ACT inhaler, Inhale 2 puffs 2 (Two) Times a Day., Disp: 1 inhaler, Rfl: 12  •  cephalexin (KEFLEX) 500 MG capsule, Take 1 capsule by mouth 3 (Three) Times a Day., Disp: 21 capsule, Rfl: 0  •  clonazePAM (KlonoPIN) 1 MG tablet, Take 1 tablet by mouth At Night As Needed for Seizures., Disp: 30 tablet, Rfl: 3  •  DULoxetine (CYMBALTA) 60 MG capsule, Take 1 capsule by mouth 2 (Two) Times a Day., Disp: 60 capsule, Rfl: 3  •  fluticasone (FLONASE) 50 MCG/ACT nasal spray, 1 spray into each nostril Daily., Disp: 1 bottle, Rfl: 6  •  HYDROcodone-acetaminophen (NORCO)  MG per tablet, take 1 tablet by mouth three times a day, Disp: , Rfl: 0  •  loratadine (CLARITIN) 10 MG tablet, Take  by mouth., Disp: , Rfl:   •  ondansetron ODT (ZOFRAN-ODT) 4 MG disintegrating tablet, Take 1 tablet by mouth Every 8 (Eight) Hours As Needed for Nausea or Vomiting., Disp: 15 tablet, Rfl: 1  •  pantoprazole (PROTONIX) 40 MG EC tablet, Take 1 tablet by mouth Daily., Disp: 30 tablet, Rfl: 0  •  sertraline (ZOLOFT)  50 MG tablet, Take 1 tablet by mouth Daily., Disp: 30 tablet, Rfl: 3  •  traZODone (DESYREL) 50 MG tablet, Take 1 tablet by mouth Every Night., Disp: 60 tablet, Rfl: 2    The following portions of the patient's history were reviewed and updated as appropriate: allergies, current medications, past family history, past medical history, past social history, past surgical history and problem list.    Review of Systems   Constitutional: Negative.    Respiratory: Negative.    Cardiovascular: Negative.    Gastrointestinal: Negative.    Musculoskeletal: Negative.    Skin: Negative.    Neurological: Negative.    Psychiatric/Behavioral: Negative.        Objective   Physical Exam   Constitutional: She is oriented to person, place, and time. She appears well-nourished.   Neck: Neck supple.   Cardiovascular: Normal rate, regular rhythm and normal heart sounds.    Pulmonary/Chest: Effort normal and breath sounds normal.   Abdominal: Bowel sounds are normal.   Neurological: She is alert and oriented to person, place, and time.   Skin: Skin is warm.   Psychiatric: She has a normal mood and affect.       All tests have been reviewed.    Assessment/Plan   There are no diagnoses linked to this encounter.            tob, CT lung 2014, lung nodule, scarring and emphysema,  CT lung no change 1/2018  hyperglycemia, continue diet  vitD low continue vitD3 1000u daily  anxiety continue zoloft 50.   and klonopin 1mg daily prn , continue cymbalta 60 bid.   CT scan showed liver cyst, large kidney cyst, adrenal gland adenoma, kidney stones non-obstructing. repeat showed no change 1/2018  right knee OA on xray aleve prn knee brace help  hip pain s/p steroid helped improved  Hyperlipidemia stable on medicine continue  Hypertension no more low bp echo normal without med, watch  Osteoporosis continue medication, refuse dexa   heme+, Colon polyps repeat 6/2019. EGD done  gastritis continue protonix  COPD continue medication. need to Quit tobacco.  tudorsa, proair, continue O2 prn, CONTINUE symbicort   Insomnia continue trazodone 100mg refill  Fibromyalgia stable watch continue medicine and follow up with pain clinic,with lortab, continue followup with pain clinic  VacUTD, pneumovax again 9/14/17, in pharmacy  Tension HA continue flexeril and naproxen  allergy flonase continue  sinus polyp per dentist, obtain rec.    Nipple retraction mamm and US:negative pathology  tob to quit,   Cysts in liver and kidney, watch  Kidney lesion renal image CT renal protocol cyst and stone non obstructing  A.fib and pericardia eff, follow cardio  Left adrenal tu benign per radilogist   Liver cyst on CT watch      4MO

## 2018-05-30 ENCOUNTER — TELEPHONE (OUTPATIENT)
Dept: INTERNAL MEDICINE | Facility: CLINIC | Age: 73
End: 2018-05-30

## 2018-05-30 DIAGNOSIS — J20.9 ACUTE BRONCHITIS, UNSPECIFIED ORGANISM: ICD-10-CM

## 2018-05-30 RX ORDER — ALBUTEROL SULFATE 1.25 MG/3ML
SOLUTION RESPIRATORY (INHALATION)
Qty: 30 VIAL | Refills: 3 | Status: SHIPPED | OUTPATIENT
Start: 2018-05-30 | End: 2019-04-29 | Stop reason: SDUPTHER

## 2018-05-30 NOTE — TELEPHONE ENCOUNTER
[facility] on [address] said you were taking care of DWO form on this patient. Stated they couldn't fill without this form.

## 2018-05-30 NOTE — TELEPHONE ENCOUNTER
"PATIENT'S DAUGHTER ROSSY CALLED AND SAID THAT THEY ARE HAVING TROUBLE PICKING UP THE ALBUTEROL FOR THE NEBULIZER. THE PHARMACY IS NEEDING A \"DWO\" WRITTEN BEFORE MEDICARE PART B WILL PAY FOR THE SUPPLIES. IF YOU HAVE ANY QUESTIONS YOU CAN GIVE HER A CALL BACK [phone].  "

## 2018-06-18 RX ORDER — DULOXETIN HYDROCHLORIDE 60 MG/1
CAPSULE, DELAYED RELEASE ORAL
Qty: 60 CAPSULE | Refills: 3 | Status: SHIPPED | OUTPATIENT
Start: 2018-06-18 | End: 2018-10-15 | Stop reason: SDUPTHER

## 2018-09-19 RX ORDER — PANTOPRAZOLE SODIUM 40 MG/1
TABLET, DELAYED RELEASE ORAL
Qty: 30 TABLET | Refills: 5 | Status: SHIPPED | OUTPATIENT
Start: 2018-09-19 | End: 2019-02-12 | Stop reason: SDUPTHER

## 2018-10-15 RX ORDER — DULOXETIN HYDROCHLORIDE 60 MG/1
CAPSULE, DELAYED RELEASE ORAL
Qty: 60 CAPSULE | Refills: 3 | Status: SHIPPED | OUTPATIENT
Start: 2018-10-15 | End: 2019-02-12 | Stop reason: SDUPTHER

## 2018-10-22 ENCOUNTER — OFFICE VISIT (OUTPATIENT)
Dept: INTERNAL MEDICINE | Facility: CLINIC | Age: 73
End: 2018-10-22

## 2018-10-22 VITALS
SYSTOLIC BLOOD PRESSURE: 122 MMHG | BODY MASS INDEX: 19.33 KG/M2 | DIASTOLIC BLOOD PRESSURE: 78 MMHG | HEIGHT: 65 IN | WEIGHT: 116 LBS | HEART RATE: 77 BPM | OXYGEN SATURATION: 95 %

## 2018-10-22 DIAGNOSIS — F41.9 ANXIETY: ICD-10-CM

## 2018-10-22 DIAGNOSIS — I48.91 ATRIAL FIBRILLATION, UNSPECIFIED TYPE (HCC): ICD-10-CM

## 2018-10-22 DIAGNOSIS — I10 ESSENTIAL HYPERTENSION: ICD-10-CM

## 2018-10-22 DIAGNOSIS — E78.5 HYPERLIPIDEMIA, UNSPECIFIED HYPERLIPIDEMIA TYPE: Primary | ICD-10-CM

## 2018-10-22 DIAGNOSIS — M79.7 FIBROMYALGIA: ICD-10-CM

## 2018-10-22 DIAGNOSIS — J43.9 PULMONARY EMPHYSEMA, UNSPECIFIED EMPHYSEMA TYPE (HCC): ICD-10-CM

## 2018-10-22 PROCEDURE — 99214 OFFICE O/P EST MOD 30 MIN: CPT | Performed by: INTERNAL MEDICINE

## 2018-10-22 RX ORDER — SERTRALINE HYDROCHLORIDE 100 MG/1
100 TABLET, FILM COATED ORAL DAILY
Qty: 30 TABLET | Refills: 5 | Status: SHIPPED | OUTPATIENT
Start: 2018-10-22 | End: 2019-02-12 | Stop reason: SDUPTHER

## 2018-10-22 NOTE — PROGRESS NOTES
Subjective   Gabi Dudley is a 73 y.o. female.     Chief Complaint   Patient presents with   • Follow-up     5 month       History of Present Illness   Patient here for follow-up.  Hyperglycemia on diet to stable.  Anxiety still some on medication.  Hyperlipidemia on medication stable need the blood tests.  Hypertension stable medication.  COPD stable and now. Insomnia on med still occa sleep problems    Current Outpatient Prescriptions:   •  albuterol (ACCUNEB) 1.25 MG/3ML nebulizer solution, 1 ampule Q6H. J44.1- J96.21, Disp: 30 vial, Rfl: 3  •  albuterol (PROVENTIL HFA;VENTOLIN HFA) 108 (90 Base) MCG/ACT inhaler, Inhale 2 puffs Every 4 (Four) Hours As Needed for Wheezing., Disp: 1 inhaler, Rfl: 5  •  atorvastatin (LIPITOR) 20 MG tablet, Take 20 mg by mouth Daily., Disp: , Rfl: 0  •  budesonide-formoterol (SYMBICORT) 160-4.5 MCG/ACT inhaler, Inhale 2 puffs 2 (Two) Times a Day., Disp: 1 inhaler, Rfl: 12  •  cephalexin (KEFLEX) 500 MG capsule, Take 1 capsule by mouth 3 (Three) Times a Day., Disp: 21 capsule, Rfl: 0  •  clonazePAM (KlonoPIN) 1 MG tablet, Take 1 tablet by mouth At Night As Needed for Seizures., Disp: 30 tablet, Rfl: 3  •  DULoxetine (CYMBALTA) 60 MG capsule, take 1 capsule by mouth twice a day, Disp: 60 capsule, Rfl: 3  •  fluticasone (FLONASE) 50 MCG/ACT nasal spray, 1 spray into each nostril Daily., Disp: 1 bottle, Rfl: 6  •  HYDROcodone-acetaminophen (NORCO)  MG per tablet, take 1 tablet by mouth three times a day, Disp: , Rfl: 0  •  loratadine (CLARITIN) 10 MG tablet, Take  by mouth., Disp: , Rfl:   •  ondansetron ODT (ZOFRAN-ODT) 4 MG disintegrating tablet, Take 1 tablet by mouth Every 8 (Eight) Hours As Needed for Nausea or Vomiting., Disp: 15 tablet, Rfl: 1  •  pantoprazole (PROTONIX) 40 MG EC tablet, take 1 tablet by mouth every morning 30 MINUTES BEFORE BREAKFAST, Disp: 30 tablet, Rfl: 5  •  sertraline (ZOLOFT) 50 MG tablet, take 1 tablet by mouth once daily, Disp: 30 tablet, Rfl:  3  •  traZODone (DESYREL) 50 MG tablet, Take 1 tablet by mouth Every Night., Disp: 60 tablet, Rfl: 2    The following portions of the patient's history were reviewed and updated as appropriate: allergies, current medications, past family history, past medical history, past social history, past surgical history and problem list.    Review of Systems   Constitutional: Negative.    Respiratory: Negative.    Cardiovascular: Negative.    Gastrointestinal: Negative.    Musculoskeletal: Negative.    Skin: Negative.    Neurological: Negative.    Psychiatric/Behavioral: Negative.        Objective   Physical Exam   Constitutional: She is oriented to person, place, and time. She appears well-nourished.   Neck: Neck supple.   Cardiovascular: Normal rate, regular rhythm and normal heart sounds.    Pulmonary/Chest: Effort normal and breath sounds normal.   Abdominal: Bowel sounds are normal.   Neurological: She is alert and oriented to person, place, and time.   Skin: Skin is warm.   Psychiatric: She has a normal mood and affect.       All tests have been reviewed.    Assessment/Plan   There are no diagnoses linked to this encounter.            tob, CT lung 2014, lung nodule, scarring and emphysema,  CT lung no change 1/2018  hyperglycemia, continue diet  vitD low continue vitD3 1000u daily  anxiety continue zoloft 50 increase to 100mg daily.   and klonopin 1mg daily prn , continue cymbalta 60 bid.   CT scan showed liver cyst, large kidney cyst, adrenal gland adenoma, kidney stones non-obstructing. repeat showed no change 1/2018  right knee OA on xray aleve prn knee brace help  hip pain s/p steroid helped improved  Hyperlipidemia stable on medicine continue  Hypertension no more low bp echo normal without med, watch  Osteoporosis continue medication, refuse dexa   heme+, Colon polyps repeat 6/2019. EGD done  gastritis continue protonix  COPD continue medication. need to Quit tobacco. tudorsa, proair, continue O2 prn, symbicort    Insomnia continue trazodone 100mg refill  Fibromyalgia stable watch continue medicine and follow up with pain clinic,with lortab  VacUTD, pneumovax again 9/14/17, in pharmacy  Tension HA continue flexeril and naproxen  allergy flonase continue  sinus polyp per dentist, obtain rec.    Nipple retraction mamm and US:negative pathology  tob to quit,   Cysts in liver and kidney, watch  Kidney lesion renal image CT renal protocol cyst and stone non obstructing  A.fib and pericardia eff, follow cardio  Left adrenal tu benign per radilogist   Liver cyst on CT watch  Right neck SK watch for now, patient to call if increase in size or change in color      6 MO wellness

## 2018-11-02 ENCOUNTER — EPISODE CHANGES (OUTPATIENT)
Dept: CASE MANAGEMENT | Facility: OTHER | Age: 73
End: 2018-11-02

## 2018-11-05 ENCOUNTER — EPISODE CHANGES (OUTPATIENT)
Dept: CASE MANAGEMENT | Facility: OTHER | Age: 73
End: 2018-11-05

## 2018-11-06 ENCOUNTER — EPISODE CHANGES (OUTPATIENT)
Dept: CASE MANAGEMENT | Facility: OTHER | Age: 73
End: 2018-11-06

## 2018-11-09 ENCOUNTER — EPISODE CHANGES (OUTPATIENT)
Dept: CASE MANAGEMENT | Facility: OTHER | Age: 73
End: 2018-11-09

## 2018-11-18 ENCOUNTER — PATIENT OUTREACH (OUTPATIENT)
Dept: CASE MANAGEMENT | Facility: OTHER | Age: 73
End: 2018-11-18

## 2018-11-19 ENCOUNTER — TELEPHONE (OUTPATIENT)
Dept: INTERNAL MEDICINE | Facility: CLINIC | Age: 73
End: 2018-11-19

## 2018-11-19 RX ORDER — CLONAZEPAM 1 MG/1
TABLET ORAL
Qty: 30 TABLET | Refills: 3 | Status: SHIPPED | OUTPATIENT
Start: 2018-11-19 | End: 2019-03-21 | Stop reason: SDUPTHER

## 2019-01-02 ENCOUNTER — OFFICE VISIT (OUTPATIENT)
Dept: INTERNAL MEDICINE | Facility: CLINIC | Age: 74
End: 2019-01-02

## 2019-01-02 VITALS
TEMPERATURE: 98.5 F | SYSTOLIC BLOOD PRESSURE: 104 MMHG | BODY MASS INDEX: 18.99 KG/M2 | RESPIRATION RATE: 16 BRPM | WEIGHT: 114 LBS | OXYGEN SATURATION: 98 % | HEIGHT: 65 IN | HEART RATE: 94 BPM | DIASTOLIC BLOOD PRESSURE: 62 MMHG

## 2019-01-02 DIAGNOSIS — R53.83 FATIGUE, UNSPECIFIED TYPE: ICD-10-CM

## 2019-01-02 DIAGNOSIS — J44.1 COPD EXACERBATION (HCC): ICD-10-CM

## 2019-01-02 DIAGNOSIS — M19.90 ARTHRITIS: Primary | ICD-10-CM

## 2019-01-02 PROCEDURE — 99214 OFFICE O/P EST MOD 30 MIN: CPT | Performed by: INTERNAL MEDICINE

## 2019-01-02 RX ORDER — DOXYCYCLINE 100 MG/1
100 CAPSULE ORAL EVERY 12 HOURS SCHEDULED
Qty: 28 CAPSULE | Refills: 0 | Status: SHIPPED | OUTPATIENT
Start: 2019-01-02 | End: 2019-04-29

## 2019-01-02 RX ORDER — METHYLPREDNISOLONE 4 MG/1
TABLET ORAL
Qty: 21 EACH | Refills: 0 | Status: SHIPPED | OUTPATIENT
Start: 2019-01-02 | End: 2019-04-29

## 2019-01-02 NOTE — PROGRESS NOTES
Subjective     Patient ID: Gabi Dudley is a 73 y.o. female. Patient is here for management of multiple medical problems.     Chief Complaint   Patient presents with   • URI     patient coughing, chest feels tight, nasal drainage is yellow x 3 to 4 days     History of Present Illness       Cough and nasal discharge. X 4 days and getting worse. occ fever and chills. Advanced copd and asthma. Has home o2 and portable o2.    Has o2 for travel but not needed till now.      otc meds: mucinex and alk cold med.    Pt grew up sleeping in tents and on the ground.  [place], [place], [place].  This was holiday time for her.  Many ticks.       The following portions of the patient's history were reviewed and updated as appropriate: allergies, current medications, past family history, past medical history, past social history, past surgical history and problem list.    Review of Systems   Constitutional: Positive for fatigue.   Respiratory: Positive for cough and shortness of breath.    Cardiovascular: Negative for chest pain, palpitations and leg swelling.   Gastrointestinal: Negative for abdominal distention.   Musculoskeletal: Negative for arthralgias.   Psychiatric/Behavioral: Negative for sleep disturbance.   All other systems reviewed and are negative.      Current Outpatient Medications:   •  albuterol (ACCUNEB) 1.25 MG/3ML nebulizer solution, 1 ampule Q6H. J44.1- J96.21, Disp: 30 vial, Rfl: 3  •  albuterol (PROVENTIL HFA;VENTOLIN HFA) 108 (90 Base) MCG/ACT inhaler, Inhale 2 puffs Every 4 (Four) Hours As Needed for Wheezing., Disp: 1 inhaler, Rfl: 5  •  atorvastatin (LIPITOR) 20 MG tablet, Take 20 mg by mouth Daily., Disp: , Rfl: 0  •  budesonide-formoterol (SYMBICORT) 160-4.5 MCG/ACT inhaler, Inhale 2 puffs 2 (Two) Times a Day., Disp: 1 inhaler, Rfl: 12  •  cephalexin (KEFLEX) 500 MG capsule, Take 1 capsule by mouth 3 (Three) Times a Day., Disp: 21 capsule, Rfl: 0  •  clonazePAM (KlonoPIN) 1 MG tablet, take 1 tablet by  "mouth every evening if needed for SEIZURES, Disp: 30 tablet, Rfl: 3  •  DULoxetine (CYMBALTA) 60 MG capsule, take 1 capsule by mouth twice a day, Disp: 60 capsule, Rfl: 3  •  fluticasone (FLONASE) 50 MCG/ACT nasal spray, 1 spray into each nostril Daily., Disp: 1 bottle, Rfl: 6  •  HYDROcodone-acetaminophen (NORCO)  MG per tablet, take 1 tablet by mouth three times a day, Disp: , Rfl: 0  •  loratadine (CLARITIN) 10 MG tablet, Take  by mouth., Disp: , Rfl:   •  ondansetron ODT (ZOFRAN-ODT) 4 MG disintegrating tablet, Take 1 tablet by mouth Every 8 (Eight) Hours As Needed for Nausea or Vomiting., Disp: 15 tablet, Rfl: 1  •  pantoprazole (PROTONIX) 40 MG EC tablet, take 1 tablet by mouth every morning 30 MINUTES BEFORE BREAKFAST, Disp: 30 tablet, Rfl: 5  •  sertraline (ZOLOFT) 100 MG tablet, Take 1 tablet by mouth Daily., Disp: 30 tablet, Rfl: 5  •  traZODone (DESYREL) 50 MG tablet, Take 1 tablet by mouth Every Night., Disp: 60 tablet, Rfl: 2  •  doxycycline (MONODOX) 100 MG capsule, Take 1 capsule by mouth Every 12 (Twelve) Hours., Disp: 28 capsule, Rfl: 0  •  MethylPREDNISolone (MEDROL, ELBA,) 4 MG tablet, Take as directed on package instructions., Disp: 21 each, Rfl: 0    Objective      Blood pressure 104/62, pulse 94, temperature 98.5 °F (36.9 °C), temperature source Oral, resp. rate 16, height 165.1 cm (65\"), weight 51.7 kg (114 lb), SpO2 98 %.    Physical Exam     General Appearance:    Alert, cooperative, no distress, appears stated age   Head:    Normocephalic, without obvious abnormality, atraumatic   Eyes:    PERRL, conjunctiva/corneas clear, EOM's intact   Ears:    Normal TM's and external ear canals, both ears   Nose:   Nares normal, septum midline, mucosa normal, no drainage   or sinus tenderness   Throat:   Lips, mucosa, and tongue normal; teeth and gums normal   Neck:   Supple, symmetrical, trachea midline, no adenopathy;        thyroid:  No enlargement/tenderness/nodules; no carotid    bruit or JVD "   Back:     Symmetric, no curvature, ROM normal, no CVA tenderness   Lungs:     Clear to auscultation bilaterally, respirations unlabored   Chest wall:    No tenderness or deformity   Heart:    Regular rate and rhythm, S1 and S2 normal, no murmur,        rub or gallop   Abdomen:     Soft, non-tender, bowel sounds active all four quadrants,     no masses, no organomegaly   Extremities:   Extremities normal, atraumatic, no cyanosis or edema   Pulses:   2+ and symmetric all extremities   Skin:   Skin color, texture, turgor normal, no rashes or lesions   Lymph nodes:   Cervical, supraclavicular, and axillary nodes normal   Neurologic:   CNII-XII intact. Normal strength, sensation and reflexes       throughout      Results for orders placed or performed in visit on 04/26/18   Comprehensive Metabolic Panel   Result Value Ref Range    Glucose 94 74 - 98 mg/dL    BUN 19 7 - 20 mg/dL    Creatinine 0.60 0.60 - 1.30 mg/dL    eGFR Non African Am 99 >60 mL/min/1.73    eGFR African Am 119 >60 mL/min/1.73    BUN/Creatinine Ratio 31.7 (H) 7.1 - 23.5    Sodium 141 137 - 145 mmol/L    Potassium 4.1 3.5 - 5.1 mmol/L    Chloride 102 98 - 107 mmol/L    Total CO2 31.0 (H) 26.0 - 30.0 mmol/L    Calcium 8.9 8.4 - 10.2 mg/dL    Total Protein 6.4 6.3 - 8.2 g/dL    Albumin 3.80 3.50 - 5.00 g/dL    Globulin 2.6 gm/dL    A/G Ratio 1.5 1.0 - 2.0 g/dL    Total Bilirubin 0.3 0.2 - 1.3 mg/dL    Alkaline Phosphatase 110 38 - 126 U/L    AST (SGOT) 25 15 - 46 U/L    ALT (SGPT) 25 13 - 69 U/L   Lipid Panel   Result Value Ref Range    Total Cholesterol 137 0 - 199 mg/dL    Triglycerides 82 <150 mg/dL    HDL Cholesterol 70 (H) 40 - 60 mg/dL    VLDL Cholesterol 16.4 mg/dL    LDL Cholesterol  51 0 - 99 mg/dL   TSH   Result Value Ref Range    TSH 2.220 0.470 - 4.680 mIU/mL   Urinalysis With / Microscopic If Indicated - Urine, Clean Catch   Result Value Ref Range    Specific Gravity, UA 1.025 1.005 - 1.030    pH, UA 6.5 5.0 - 8.0    Color, UA See below: (A)      Appearance, UA See below: (A) Clear    Leukocytes, UA See below: (A) Negative    Protein See below: (A) Negative    Glucose, UA Negative Negative    Ketones Trace (A) Negative    Blood, UA Negative Negative    Bilirubin, UA See below: (A) Negative    Urobilinogen, UA Comment     Nitrite, UA Negative Negative   Vitamin D 25 Hydroxy   Result Value Ref Range    25 Hydroxy, Vitamin D 23.5 ng/ml   Hepatitis C Antibody   Result Value Ref Range    Hep C Virus Ab <0.1 0.0 - 0.9 s/co ratio   Microscopic Examination   Result Value Ref Range    WBC, UA 6-12 (A) None Seen /HPF    RBC, UA 0-2 (A) None Seen /HPF    Epithelial Cells (non renal) 3-6 (A) /HPF    Crystal Type Comment     Mucus, UA See below: (A) /HPF    Bacteria, UA Trace (A) None Seen /HPF   CBC & Differential   Result Value Ref Range    WBC 8.45 4.80 - 10.80 10*3/mm3    RBC 5.07 4.20 - 5.40 10*6/mm3    Hemoglobin 14.4 12.0 - 16.0 g/dL    Hematocrit 46.0 37.0 - 47.0 %    MCV 90.7 81.0 - 99.0 fL    MCH 28.4 27.0 - 31.0 pg    MCHC 31.3 30.0 - 37.0 g/dL    RDW 15.6 (H) 11.5 - 14.5 %    Platelets 232 130 - 400 10*3/mm3    Neutrophil Rel % 63.2 37.0 - 80.0 %    Lymphocyte Rel % 29.5 10.0 - 50.0 %    Monocyte Rel % 5.0 0.0 - 12.0 %    Eosinophil Rel % 1.3 0.0 - 7.0 %    Basophil Rel % 0.8 0.0 - 2.5 %    Neutrophils Absolute 5.34 2.00 - 6.90 10*3/mm3    Lymphocytes Absolute 2.49 0.60 - 3.40 10*3/mm3    Monocytes Absolute 0.42 0.00 - 0.90 10*3/mm3    Eosinophils Absolute 0.11 0.00 - 0.70 10*3/mm3    Basophils Absolute 0.07 0.00 - 0.20 10*3/mm3    Immature Granulocyte Rel % 0.2 0.0 - 0.6 %    Immature Grans Absolute 0.02 0.00 - 0.06 10*3/mm3    nRBC 0.0 0.0 - 0.0 /100 WBC         Assessment/Plan       Pt with arthritis and copd with excerebration.  Arthritis never worked up.      Pt grew up in [place]  Many tick bites in past.  Pt will watch for improvement on doxy and if so will call and ask for a month long rx.  Steroids will only last a short time one week. Will give  2 weeks of doxy.      Gabi was seen today for uri.    Diagnoses and all orders for this visit:    Arthritis  -     [place] Spotted Fever, IgM  -     [place] Spotted Fever, IgG  -     Ehrlichia Antibody Panel  -     Lyme Disease, PCR  -     Ehrlichia Chaffeensis PCR  -     Rheumatoid Factor  -     Sedimentation Rate  -     Uric Acid  -     Cyclic Citrul Peptide Antibody, IgG / IgA  -     Antistreptolysin O Titer  -     C-reactive Protein    COPD exacerbation (CMS/HCC)  -     doxycycline (MONODOX) 100 MG capsule; Take 1 capsule by mouth Every 12 (Twelve) Hours.  -     MethylPREDNISolone (MEDROL, ELBA,) 4 MG tablet; Take as directed on package instructions.  -     [place] Spotted Fever, IgM  -     [place] Spotted Fever, IgG  -     Ehrlichia Antibody Panel  -     Lyme Disease, PCR  -     Ehrlichia Chaffeensis PCR  -     Rheumatoid Factor  -     Sedimentation Rate  -     Uric Acid  -     Cyclic Citrul Peptide Antibody, IgG / IgA  -     Antistreptolysin O Titer  -     C-reactive Protein    Fatigue, unspecified type  -     [place] Spotted Fever, IgM  -     [place] Spotted Fever, IgG  -     Ehrlichia Antibody Panel  -     Lyme Disease, PCR  -     Ehrlichia Chaffeensis PCR  -     Rheumatoid Factor  -     Sedimentation Rate  -     Uric Acid  -     Cyclic Citrul Peptide Antibody, IgG / IgA  -     Antistreptolysin O Titer  -     C-reactive Protein    pt will f/u with Dr cooper.        No Follow-up on file.          There are no Patient Instructions on file for this visit.     Alberto Cartagena MD    Assessment/Plan

## 2019-01-05 ENCOUNTER — DOCUMENTATION (OUTPATIENT)
Dept: INTERNAL MEDICINE | Facility: CLINIC | Age: 74
End: 2019-01-05

## 2019-01-05 ENCOUNTER — TELEPHONE (OUTPATIENT)
Dept: INTERNAL MEDICINE | Facility: CLINIC | Age: 74
End: 2019-01-05

## 2019-01-05 RX ORDER — AMOXICILLIN AND CLAVULANATE POTASSIUM 875; 125 MG/1; MG/1
1 TABLET, FILM COATED ORAL 2 TIMES DAILY
Qty: 14 TABLET | Refills: 0 | Status: SHIPPED | OUTPATIENT
Start: 2019-01-05 | End: 2019-01-12

## 2019-01-05 NOTE — TELEPHONE ENCOUNTER
Patients daughter called and wanted to know if a stronger medication could be called in for her mother other than doxycycline. She was seen by  this past week. She said she isn't much better.     Please advise patients daughter  [phone]    Confirmed Pharmacy

## 2019-02-13 RX ORDER — PANTOPRAZOLE SODIUM 40 MG/1
TABLET, DELAYED RELEASE ORAL
Qty: 30 TABLET | Refills: 5 | Status: SHIPPED | OUTPATIENT
Start: 2019-02-13 | End: 2020-01-23

## 2019-02-13 RX ORDER — DULOXETIN HYDROCHLORIDE 60 MG/1
CAPSULE, DELAYED RELEASE ORAL
Qty: 60 CAPSULE | Refills: 2 | Status: SHIPPED | OUTPATIENT
Start: 2019-02-13 | End: 2019-12-08 | Stop reason: SDUPTHER

## 2019-02-13 RX ORDER — SERTRALINE HYDROCHLORIDE 100 MG/1
TABLET, FILM COATED ORAL
Qty: 90 TABLET | Refills: 0 | Status: SHIPPED | OUTPATIENT
Start: 2019-02-13 | End: 2019-04-29 | Stop reason: SDUPTHER

## 2019-02-20 RX ORDER — FLUTICASONE PROPIONATE 50 MCG
SPRAY, SUSPENSION (ML) NASAL
Qty: 48 G | Refills: 6 | Status: SHIPPED | OUTPATIENT
Start: 2019-02-20 | End: 2019-10-24

## 2019-03-18 RX ORDER — TRAZODONE HYDROCHLORIDE 50 MG/1
TABLET ORAL
Qty: 180 TABLET | Refills: 0 | Status: SHIPPED | OUTPATIENT
Start: 2019-03-18 | End: 2019-09-18 | Stop reason: SDUPTHER

## 2019-03-21 RX ORDER — CLONAZEPAM 1 MG/1
TABLET ORAL
Qty: 30 TABLET | Refills: 3 | Status: SHIPPED | OUTPATIENT
Start: 2019-03-21 | End: 2019-07-20 | Stop reason: SDUPTHER

## 2019-04-29 ENCOUNTER — OFFICE VISIT (OUTPATIENT)
Dept: INTERNAL MEDICINE | Facility: CLINIC | Age: 74
End: 2019-04-29

## 2019-04-29 VITALS
WEIGHT: 122.8 LBS | SYSTOLIC BLOOD PRESSURE: 124 MMHG | TEMPERATURE: 96.4 F | BODY MASS INDEX: 20.46 KG/M2 | HEART RATE: 88 BPM | OXYGEN SATURATION: 94 % | HEIGHT: 65 IN | DIASTOLIC BLOOD PRESSURE: 76 MMHG

## 2019-04-29 DIAGNOSIS — E78.5 HYPERLIPIDEMIA, UNSPECIFIED HYPERLIPIDEMIA TYPE: Primary | ICD-10-CM

## 2019-04-29 DIAGNOSIS — F41.9 ANXIETY: ICD-10-CM

## 2019-04-29 DIAGNOSIS — G47.00 INSOMNIA, UNSPECIFIED TYPE: ICD-10-CM

## 2019-04-29 DIAGNOSIS — N64.59 INVERSION OF NIPPLE: ICD-10-CM

## 2019-04-29 DIAGNOSIS — M81.0 OSTEOPOROSIS, UNSPECIFIED OSTEOPOROSIS TYPE, UNSPECIFIED PATHOLOGICAL FRACTURE PRESENCE: ICD-10-CM

## 2019-04-29 DIAGNOSIS — E55.9 VITAMIN D DEFICIENCY: ICD-10-CM

## 2019-04-29 DIAGNOSIS — Z12.39 BREAST CANCER SCREENING: ICD-10-CM

## 2019-04-29 DIAGNOSIS — G44.209 TENSION HEADACHE: ICD-10-CM

## 2019-04-29 DIAGNOSIS — Z87.891 PERSONAL HISTORY OF TOBACCO USE, PRESENTING HAZARDS TO HEALTH: ICD-10-CM

## 2019-04-29 DIAGNOSIS — D35.00 ADRENAL ADENOMA, UNSPECIFIED LATERALITY: ICD-10-CM

## 2019-04-29 DIAGNOSIS — J43.9 PULMONARY EMPHYSEMA, UNSPECIFIED EMPHYSEMA TYPE (HCC): ICD-10-CM

## 2019-04-29 DIAGNOSIS — Z12.11 COLON CANCER SCREENING: ICD-10-CM

## 2019-04-29 DIAGNOSIS — M79.7 FIBROMYALGIA: ICD-10-CM

## 2019-04-29 DIAGNOSIS — N28.1 SIMPLE RENAL CYST: ICD-10-CM

## 2019-04-29 DIAGNOSIS — M17.9 OSTEOARTHRITIS OF KNEE, UNSPECIFIED LATERALITY, UNSPECIFIED OSTEOARTHRITIS TYPE: ICD-10-CM

## 2019-04-29 DIAGNOSIS — J20.9 ACUTE BRONCHITIS, UNSPECIFIED ORGANISM: ICD-10-CM

## 2019-04-29 DIAGNOSIS — K57.90 DIVERTICULOSIS OF INTESTINE WITHOUT BLEEDING, UNSPECIFIED INTESTINAL TRACT LOCATION: ICD-10-CM

## 2019-04-29 DIAGNOSIS — I31.39 PERICARDIAL EFFUSION: ICD-10-CM

## 2019-04-29 DIAGNOSIS — I10 ESSENTIAL HYPERTENSION: ICD-10-CM

## 2019-04-29 DIAGNOSIS — I48.91 ATRIAL FIBRILLATION, UNSPECIFIED TYPE (HCC): ICD-10-CM

## 2019-04-29 PROCEDURE — G0439 PPPS, SUBSEQ VISIT: HCPCS | Performed by: INTERNAL MEDICINE

## 2019-04-29 RX ORDER — SERTRALINE HYDROCHLORIDE 100 MG/1
100 TABLET, FILM COATED ORAL DAILY
Qty: 90 TABLET | Refills: 0 | Status: SHIPPED | OUTPATIENT
Start: 2019-04-29 | End: 2019-09-05 | Stop reason: SDUPTHER

## 2019-04-29 RX ORDER — BUDESONIDE AND FORMOTEROL FUMARATE DIHYDRATE 160; 4.5 UG/1; UG/1
2 AEROSOL RESPIRATORY (INHALATION)
Qty: 1 INHALER | Refills: 12 | Status: SHIPPED | OUTPATIENT
Start: 2019-04-29 | End: 2020-09-08 | Stop reason: SDUPTHER

## 2019-04-29 RX ORDER — ATORVASTATIN CALCIUM 20 MG/1
20 TABLET, FILM COATED ORAL DAILY
Qty: 90 TABLET | Refills: 3 | Status: SHIPPED | OUTPATIENT
Start: 2019-04-29 | End: 2020-03-05 | Stop reason: SDUPTHER

## 2019-04-29 RX ORDER — ALBUTEROL SULFATE 1.25 MG/3ML
SOLUTION RESPIRATORY (INHALATION)
Qty: 30 VIAL | Refills: 3 | Status: SHIPPED | OUTPATIENT
Start: 2019-04-29 | End: 2020-02-03 | Stop reason: SDUPTHER

## 2019-04-29 NOTE — PROGRESS NOTES
Patient here for annual wellness check QUICK REFERENCE INFORMATION:  The ABCs of the Annual Wellness Visit    Subsequent Medicare Wellness Visit     HEALTH RISK ASSESSMENT    : 1945    Recent Hospitalizations:  No hospitalization(s) within the last year..  ccc      Current Medical Providers:  Patient Care Team:  Paul Quinteros MD as PCP - General  Paul Quinteros MD as PCP - Family Medicine  Akiko Polo MD as PCP - Claims Attributed        Smoking Status:  Social History     Tobacco Use   Smoking Status Light Tobacco Smoker   • Types: Cigarettes   Smokeless Tobacco Never Used   Tobacco Comment    Uses Nicotine patches occasionally.        Alcohol Consumption:  Social History     Substance and Sexual Activity   Alcohol Use No       Depression Screen:   PHQ-2/PHQ-9 Depression Screening 2019   Little interest or pleasure in doing things 0   Feeling down, depressed, or hopeless 1   Trouble falling or staying asleep, or sleeping too much -   Feeling tired or having little energy -   Poor appetite or overeating -   Feeling bad about yourself - or that you are a failure or have let yourself or your family down -   Trouble concentrating on things, such as reading the newspaper or watching television -   Moving or speaking so slowly that other people could have noticed. Or the opposite - being so fidgety or restless that you have been moving around a lot more than usual -   Thoughts that you would be better off dead, or of hurting yourself in some way -   Total Score 1   If you checked off any problems, how difficult have these problems made it for you to do your work, take care of things at home, or get along with other people? -       Health Habits and Functional and Cognitive Screening:  Functional & Cognitive Status 2019   Do you have difficulty preparing food and eating? No   Do you have difficulty bathing yourself, getting dressed or grooming yourself? No   Do you have difficulty using the toilet?  No   Do you have difficulty moving around from place to place? No   Do you have trouble with steps or getting out of a bed or a chair? No   In the past year have you fallen or experienced a near fall? No   Current Diet Well Balanced Diet   Dental Exam Up to date   Eye Exam Up to date   Exercise (times per week) 7 times per week   Current Exercise Activities Include Housecleaning   Do you need help using the phone?  No   Are you deaf or do you have serious difficulty hearing?  No   Do you need help with transportation? No   Do you need help shopping? No   Do you need help preparing meals?  No   Do you need help with housework?  No   Do you need help with laundry? No   Do you need help taking your medications? No   Do you need help managing money? No   Do you ever drive or ride in a car without wearing a seat belt? No   Have you felt unusual stress, anger or loneliness in the last month? No   Who do you live with? Child   If you need help, do you have trouble finding someone available to you? No   Have you been bothered in the last four weeks by sexual problems? No   Do you have difficulty concentrating, remembering or making decisions? Yes           Does the patient have evidence of cognitive impairment? No    Asiprin use counseling: Start ASA 81 mg daily       Recent Lab Results:    Lab Results   Component Value Date    GLU 94 05/21/2018     Lab Results   Component Value Date    HGBA1C 6.20 (H) 01/09/2017     Lab Results   Component Value Date    CHOL 177 01/09/2017    TRIG 82 05/21/2018    HDL 70 (H) 05/21/2018    VLDL 16.4 05/21/2018           Age-appropriate Screening Schedule:  Refer to the list below for future screening recommendations based on patient's age, sex and/or medical conditions. Orders for these recommended tests are listed in the plan section. The patient has been provided with a written plan.    Health Maintenance   Topic Date Due   • ZOSTER VACCINE (2 of 2) 06/10/2016   • MAMMOGRAM  01/06/2019    • DXA SCAN  01/17/2019   • LIPID PANEL  05/21/2019   • INFLUENZA VACCINE  08/01/2019   • TDAP/TD VACCINES (2 - Td) 02/02/2020   • COLONOSCOPY  06/21/2026   • PNEUMOCOCCAL VACCINES (65+ LOW/MEDIUM RISK)  Completed        Subjective   History of Present Illness  Patient here for annual wellness check.  Anxiety stable on medication.  The vitamin D stable on supplement.  Patient still smokes even though patient smokes 1 cigarette a day now.  Hyperlipidemia stable on medication.  Hypertension stable without medication.  COPD stable now.  Insomnia on medication stable.  Fibromyalgia stable patient is seeing pain clinic on pain medication.  Atrial fibrillation patient is seeing cardiologist   Gabi Russell Dudley is a 74 y.o. female who presents for an Annual Wellness Visit.    The following portions of the patient's history were reviewed and updated as appropriate: allergies, current medications, past family history, past medical history, past social history, past surgical history and problem list.    Outpatient Medications Prior to Visit   Medication Sig Dispense Refill   • albuterol (PROVENTIL HFA;VENTOLIN HFA) 108 (90 Base) MCG/ACT inhaler Inhale 2 puffs Every 4 (Four) Hours As Needed for Wheezing. 1 inhaler 5   • DULoxetine (CYMBALTA) 60 MG capsule take 1 capsule by mouth twice a day 60 capsule 2   • fluticasone (FLONASE) 50 MCG/ACT nasal spray instill 1 spray into each nostril once daily 48 g 6   • HYDROcodone-acetaminophen (NORCO)  MG per tablet take 1 tablet by mouth three times a day  0   • loratadine (CLARITIN) 10 MG tablet Take  by mouth.     • pantoprazole (PROTONIX) 40 MG EC tablet take 1 tablet by mouth every morning 30 MINUTES BEFORE BREAKFAST 30 tablet 5   • traZODone (DESYREL) 50 MG tablet take 1 tablet by mouth every evening 180 tablet 0   • albuterol (ACCUNEB) 1.25 MG/3ML nebulizer solution 1 ampule Q6H. J44.1- J96.21 30 vial 3   • atorvastatin (LIPITOR) 20 MG tablet Take 20 mg by mouth Daily.  0   •  budesonide-formoterol (SYMBICORT) 160-4.5 MCG/ACT inhaler Inhale 2 puffs 2 (Two) Times a Day. 1 inhaler 12   • sertraline (ZOLOFT) 100 MG tablet take 1 tablet by mouth once daily 90 tablet 0   • clonazePAM (KlonoPIN) 1 MG tablet take 1 tablet by mouth every evening if needed 30 tablet 3   • cephalexin (KEFLEX) 500 MG capsule Take 1 capsule by mouth 3 (Three) Times a Day. 21 capsule 0   • doxycycline (MONODOX) 100 MG capsule Take 1 capsule by mouth Every 12 (Twelve) Hours. 28 capsule 0   • MethylPREDNISolone (MEDROL, ELBA,) 4 MG tablet Take as directed on package instructions. 21 each 0   • ondansetron ODT (ZOFRAN-ODT) 4 MG disintegrating tablet Take 1 tablet by mouth Every 8 (Eight) Hours As Needed for Nausea or Vomiting. 15 tablet 1     No facility-administered medications prior to visit.        Patient Active Problem List   Diagnosis   • Adrenal adenoma   • Anxiety   • Fatigue   • Fibromyalgia   • Hyperlipidemia   • Hypertension   • Insomnia   • Osteoarthritis of knee   • Osteoporosis   • Pulmonary emphysema (CMS/HCC)   • Simple renal cyst   • Tension headache   • Vitamin D deficiency   • Diverticulosis   • Inversion of nipple   • Atrial fibrillation (CMS/HCC)   • Pericardial effusion       Advance Care Planning:  Patient does not have an advance directive - not interested in additional information    Identification of Risk Factors:  Risk factors include: inactivity.    Review of Systems   Constitutional: Negative.    Respiratory: Negative.    Cardiovascular: Negative.    Gastrointestinal: Negative.    Skin: Negative.    Psychiatric/Behavioral: Negative.        Compared to one year ago, the patient feels her physical health is the same.  Compared to one year ago, the patient feels her mental health is the same.    Objective     Physical Exam   Constitutional: She is oriented to person, place, and time. She appears well-developed and well-nourished.   Neck: Neck supple.   Cardiovascular: Normal rate, regular rhythm  "and normal heart sounds.   Pulmonary/Chest: Effort normal and breath sounds normal.   Abdominal: Soft. Bowel sounds are normal.   Neurological: She is alert and oriented to person, place, and time.   Psychiatric: She has a normal mood and affect. Her behavior is normal.       Vitals:    04/29/19 1410   BP: 124/76   Pulse: 88   Temp: 96.4 °F (35.8 °C)   TempSrc: Temporal   SpO2: 94%   Weight: 55.7 kg (122 lb 12.8 oz)   Height: 165.1 cm (65\")   PainSc:   6   PainLoc: Shoulder       Patient's Body mass index is 20.43 kg/m². BMI is within normal parameters. No follow-up required..      Assessment/Plan   Patient Self-Management and Personalized Health Advice  The patient has been provided with information about: exercise and preventive services including:   · Advance directive.    Visit Diagnoses:    ICD-10-CM ICD-9-CM   1. Acute bronchitis, unspecified organism J20.9 466.0       No orders of the defined types were placed in this encounter.      Outpatient Encounter Medications as of 4/29/2019   Medication Sig Dispense Refill   • albuterol (ACCUNEB) 1.25 MG/3ML nebulizer solution 1 ampule Q6H. J44.1- J96.21 30 vial 3   • albuterol (PROVENTIL HFA;VENTOLIN HFA) 108 (90 Base) MCG/ACT inhaler Inhale 2 puffs Every 4 (Four) Hours As Needed for Wheezing. 1 inhaler 5   • atorvastatin (LIPITOR) 20 MG tablet Take 1 tablet by mouth Daily. 90 tablet 3   • budesonide-formoterol (SYMBICORT) 160-4.5 MCG/ACT inhaler Inhale 2 puffs 2 (Two) Times a Day. 1 inhaler 12   • DULoxetine (CYMBALTA) 60 MG capsule take 1 capsule by mouth twice a day 60 capsule 2   • fluticasone (FLONASE) 50 MCG/ACT nasal spray instill 1 spray into each nostril once daily 48 g 6   • HYDROcodone-acetaminophen (NORCO)  MG per tablet take 1 tablet by mouth three times a day  0   • loratadine (CLARITIN) 10 MG tablet Take  by mouth.     • pantoprazole (PROTONIX) 40 MG EC tablet take 1 tablet by mouth every morning 30 MINUTES BEFORE BREAKFAST 30 tablet 5   • " sertraline (ZOLOFT) 100 MG tablet Take 1 tablet by mouth Daily. 90 tablet 0   • traZODone (DESYREL) 50 MG tablet take 1 tablet by mouth every evening 180 tablet 0   • [DISCONTINUED] albuterol (ACCUNEB) 1.25 MG/3ML nebulizer solution 1 ampule Q6H. J44.1- J96.21 30 vial 3   • [DISCONTINUED] atorvastatin (LIPITOR) 20 MG tablet Take 20 mg by mouth Daily.  0   • [DISCONTINUED] budesonide-formoterol (SYMBICORT) 160-4.5 MCG/ACT inhaler Inhale 2 puffs 2 (Two) Times a Day. 1 inhaler 12   • [DISCONTINUED] sertraline (ZOLOFT) 100 MG tablet take 1 tablet by mouth once daily 90 tablet 0   • clonazePAM (KlonoPIN) 1 MG tablet take 1 tablet by mouth every evening if needed 30 tablet 3   • [DISCONTINUED] cephalexin (KEFLEX) 500 MG capsule Take 1 capsule by mouth 3 (Three) Times a Day. 21 capsule 0   • [DISCONTINUED] doxycycline (MONODOX) 100 MG capsule Take 1 capsule by mouth Every 12 (Twelve) Hours. 28 capsule 0   • [DISCONTINUED] MethylPREDNISolone (MEDROL, ELBA,) 4 MG tablet Take as directed on package instructions. 21 each 0   • [DISCONTINUED] ondansetron ODT (ZOFRAN-ODT) 4 MG disintegrating tablet Take 1 tablet by mouth Every 8 (Eight) Hours As Needed for Nausea or Vomiting. 15 tablet 1     No facility-administered encounter medications on file as of 4/29/2019.        Reviewed use of high risk medication in the elderly: yes  Reviewed for potential of harmful drug interactions in the elderly: no    Follow Up:  No Follow-up on file.     An After Visit Summary and PPPS with all of these plans were given to the patient.            tob, CT lung 2014, lung nodule, scarring and emphysema,  CT lung no change 1/2018. Resume low dose CT --  hyperglycemia, continue diet  vitD low continue vitD3 1000u daily  anxiety continue zoloft 100mg daily.   and klonopin 1mg daily prn , continue cymbalta 60 bid.   CT scan showed liver cyst, large kidney cyst, adrenal gland adenoma, kidney stones non-obstructing. repeat showed no change 1/2018  right  knee OA on xray aleve prn knee brace help  hip pain s/p steroid helped improved  Hyperlipidemia stable on medicine continue  Hypertension normal without med,   Osteoporosis continue medication, refuse dexa   heme+, Colon polyps repeat 6/2019. EGD done  gastritis continue protonix  COPD continue medication. need to Quit tobacco. tudorsa, proair, continue O2 prn, symbicort   Insomnia continue trazodone 100mg refill  Fibromyalgia stable watch continue medicine and follow up with pain clinic,with lortab  VacUTD, pneumovax again 9/14/17, in pharmacy  Tension HA continue flexeril and naproxen  allergy flonase continue  sinus polyp per dentist, obtain rec.    Nipple retraction mamm and US:negative pathology  tob to quit,   Cysts in liver and kidney, watch  Kidney lesion renal image CT renal protocol cyst and stone non obstructing  A.fib and pericardia eff, follow cardio  Left adrenal tu benign per radilogist   Liver cyst on CT watch  Right neck SK watch for now, patient to call if increase in size or change in color  1 mo after labs

## 2019-05-02 ENCOUNTER — TRANSCRIBE ORDERS (OUTPATIENT)
Dept: ADMINISTRATIVE | Facility: HOSPITAL | Age: 74
End: 2019-05-02

## 2019-05-08 ENCOUNTER — APPOINTMENT (OUTPATIENT)
Dept: CT IMAGING | Facility: HOSPITAL | Age: 74
End: 2019-05-08

## 2019-05-21 ENCOUNTER — HOSPITAL ENCOUNTER (OUTPATIENT)
Dept: CT IMAGING | Facility: HOSPITAL | Age: 74
Discharge: HOME | End: 2019-05-21
Admitting: INTERNAL MEDICINE

## 2019-05-21 PROCEDURE — G0297 LDCT FOR LUNG CA SCREEN: HCPCS

## 2019-06-07 ENCOUNTER — APPOINTMENT (OUTPATIENT)
Dept: OTHER | Facility: HOSPITAL | Age: 74
End: 2019-06-07

## 2019-06-07 ENCOUNTER — HOSPITAL ENCOUNTER (OUTPATIENT)
Dept: MAMMOGRAPHY | Facility: HOSPITAL | Age: 74
Discharge: HOME | End: 2019-06-07
Admitting: INTERNAL MEDICINE

## 2019-06-07 DIAGNOSIS — Z92.89 HX OF MAMMOGRAM: ICD-10-CM

## 2019-06-07 PROCEDURE — 77067 SCR MAMMO BI INCL CAD: CPT

## 2019-06-07 PROCEDURE — 77067 SCR MAMMO BI INCL CAD: CPT | Performed by: RADIOLOGY

## 2019-06-07 PROCEDURE — 77063 BREAST TOMOSYNTHESIS BI: CPT

## 2019-06-07 PROCEDURE — 77063 BREAST TOMOSYNTHESIS BI: CPT | Performed by: RADIOLOGY

## 2019-06-24 ENCOUNTER — OFFICE VISIT (OUTPATIENT)
Dept: GASTROENTEROLOGY | Facility: CLINIC | Age: 74
End: 2019-06-24

## 2019-06-24 VITALS
BODY MASS INDEX: 20.49 KG/M2 | HEIGHT: 65 IN | DIASTOLIC BLOOD PRESSURE: 76 MMHG | HEART RATE: 84 BPM | SYSTOLIC BLOOD PRESSURE: 119 MMHG | TEMPERATURE: 97.4 F | RESPIRATION RATE: 16 BRPM | WEIGHT: 123 LBS

## 2019-06-24 DIAGNOSIS — Z12.11 COLON CANCER SCREENING: Primary | ICD-10-CM

## 2019-06-24 DIAGNOSIS — R12 HEARTBURN: Chronic | ICD-10-CM

## 2019-06-24 PROCEDURE — 99213 OFFICE O/P EST LOW 20 MIN: CPT | Performed by: NURSE PRACTITIONER

## 2019-06-24 RX ORDER — SODIUM CHLORIDE 9 MG/ML
70 INJECTION, SOLUTION INTRAVENOUS CONTINUOUS PRN
Status: CANCELLED | OUTPATIENT
Start: 2019-07-24

## 2019-06-24 RX ORDER — ASPIRIN 81 MG/1
81 TABLET ORAL DAILY
COMMUNITY
End: 2020-01-07

## 2019-06-24 NOTE — PROGRESS NOTES
Chief Complaint   Patient presents with   • Colon Cancer Screening     The patient denies recent change in bowel habits. There is no diarrhea or constipation. There is no history of abdominal pain. There is no history of overt GI bleed (hematemesis melena or hematochezia). The patient denies nausea or vomiting. There is a history of heartburn. Heartburn is well controlled with Pantoprazole. The patient denies dysphagia or odynophagia. There is no history of recent significant weight loss. There is no history of liver disease in the past. There is a family history of colon cancer in an aunt. The patient's last colonoscopy was in 2016.    Heartburn   She complains of heartburn. She reports no abdominal pain, no chest pain, no coughing or no nausea. This is a chronic problem. Episode onset: over 5 years ago. The problem occurs occasionally. The problem has been unchanged. The heartburn duration is an hour. The heartburn is located in the substernum. The heartburn is of mild intensity. The heartburn does not wake her from sleep. Nothing aggravates the symptoms. Pertinent negatives include no fatigue. She has tried a PPI for the symptoms. The treatment provided significant relief. Past procedures include an EGD (2015).     Review of Systems   Constitutional: Negative for appetite change, chills, fatigue, fever and unexpected weight change.   HENT: Negative for mouth sores, nosebleeds and trouble swallowing.    Eyes: Negative for discharge and redness.   Respiratory: Negative for apnea, cough and shortness of breath.    Cardiovascular: Negative for chest pain, palpitations and leg swelling.   Gastrointestinal: Positive for heartburn. Negative for abdominal distention, abdominal pain, anal bleeding, blood in stool, constipation, diarrhea, nausea and vomiting.   Endocrine: Negative for cold intolerance, heat intolerance and polydipsia.   Genitourinary: Negative for dysuria, hematuria and urgency.   Musculoskeletal: Positive  for arthralgias. Negative for joint swelling and myalgias.   Skin: Negative for rash.   Allergic/Immunologic: Negative for food allergies and immunocompromised state.   Neurological: Positive for headaches. Negative for dizziness, seizures and syncope.   Hematological: Negative for adenopathy. Bruises/bleeds easily.   Psychiatric/Behavioral: Negative for dysphoric mood. The patient is nervous/anxious. The patient is not hyperactive.      Patient Active Problem List   Diagnosis   • Adrenal adenoma   • Anxiety   • Fibromyalgia   • Hyperlipidemia   • Hypertension   • Insomnia   • Osteoarthritis of knee   • Osteoporosis   • Pulmonary emphysema (CMS/HCC)   • Simple renal cyst   • Tension headache   • Vitamin D deficiency   • Diverticulosis   • Inversion of nipple   • Atrial fibrillation (CMS/HCC)   • Pericardial effusion   • Heartburn     Past Medical History:   Diagnosis Date   • Adrenal adenoma    • Anemia    • Anxiety    • Asthma    • Back pain    • Benign colonic polyp    • COPD (chronic obstructive pulmonary disease) (CMS/HCC) 2005   • Depression    • Fibromyalgia    • Gastritis    • Generalized anxiety disorder    • Hemorrhoids    • History of blood transfusion    • History of endometriosis    • History of osteoarthritis    • Hypertension    • Kidney stone    • Liver cyst    • Nodular radiologic density    • Osteoarthritis    • Renal cyst    • Sinus problem     2014   • Sinusitis    • Skin cancer     basal cell carcinoma   • Tobacco use    • Vitamin D deficiency      Past Surgical History:   Procedure Laterality Date   • APPENDECTOMY  1980s   • CATARACT EXTRACTION  2013    both eyes   • COLONOSCOPY  03/11/2013   • COLONOSCOPY  06/21/2016   • COLONOSCOPY W/ BIOPSIES AND POLYPECTOMY     • HYSTERECTOMY  1980s    partial   • TONSILLECTOMY  1987   • UPPER GASTROINTESTINAL ENDOSCOPY  01/13/2015     Family History   Problem Relation Age of Onset   • Stomach cancer Brother    • Colon cancer Maternal Aunt    • Brain cancer  "Father    • Lung cancer Paternal Grandfather    • Breast cancer Neg Hx    • Ovarian cancer Neg Hx      Social History     Tobacco Use   • Smoking status: Light Tobacco Smoker     Types: Cigarettes   • Smokeless tobacco: Never Used   • Tobacco comment: Uses Nicotine patches occasionally.    Substance Use Topics   • Alcohol use: No       Current Outpatient Medications:   •  albuterol (ACCUNEB) 1.25 MG/3ML nebulizer solution, 1 ampule Q6H. J44.1- J96.21, Disp: 30 vial, Rfl: 3  •  albuterol (PROVENTIL HFA;VENTOLIN HFA) 108 (90 Base) MCG/ACT inhaler, Inhale 2 puffs Every 4 (Four) Hours As Needed for Wheezing., Disp: 1 inhaler, Rfl: 5  •  aspirin 81 MG EC tablet, Take 81 mg by mouth Daily., Disp: , Rfl:   •  atorvastatin (LIPITOR) 20 MG tablet, Take 1 tablet by mouth Daily., Disp: 90 tablet, Rfl: 3  •  budesonide-formoterol (SYMBICORT) 160-4.5 MCG/ACT inhaler, Inhale 2 puffs 2 (Two) Times a Day., Disp: 1 inhaler, Rfl: 12  •  clonazePAM (KlonoPIN) 1 MG tablet, take 1 tablet by mouth every evening if needed, Disp: 30 tablet, Rfl: 3  •  DULoxetine (CYMBALTA) 60 MG capsule, take 1 capsule by mouth twice a day, Disp: 60 capsule, Rfl: 2  •  fluticasone (FLONASE) 50 MCG/ACT nasal spray, instill 1 spray into each nostril once daily, Disp: 48 g, Rfl: 6  •  HYDROcodone-acetaminophen (NORCO)  MG per tablet, take 1 tablet by mouth three times a day, Disp: , Rfl: 0  •  loratadine (CLARITIN) 10 MG tablet, Take  by mouth., Disp: , Rfl:   •  pantoprazole (PROTONIX) 40 MG EC tablet, take 1 tablet by mouth every morning 30 MINUTES BEFORE BREAKFAST, Disp: 30 tablet, Rfl: 5  •  sertraline (ZOLOFT) 100 MG tablet, Take 1 tablet by mouth Daily., Disp: 90 tablet, Rfl: 0  •  traZODone (DESYREL) 50 MG tablet, take 1 tablet by mouth every evening, Disp: 180 tablet, Rfl: 0    No Known Allergies    /76   Pulse 84   Temp 97.4 °F (36.3 °C)   Resp 16   Ht 165.1 cm (65\")   Wt 55.8 kg (123 lb)   BMI 20.47 kg/m²     Physical Exam "   Constitutional: She is oriented to person, place, and time. She appears well-developed and well-nourished. No distress.   HENT:   Head: Normocephalic and atraumatic.   Right Ear: Hearing and external ear normal.   Left Ear: Hearing and external ear normal.   Nose: Nose normal.   Mouth/Throat: Oropharynx is clear and moist and mucous membranes are normal. Mucous membranes are not pale, not dry and not cyanotic. No oral lesions. No oropharyngeal exudate.   Eyes: Conjunctivae and EOM are normal. Right eye exhibits no discharge. Left eye exhibits no discharge.   Neck: Trachea normal. Neck supple. No JVD present. No edema present. No thyroid mass and no thyromegaly present.   Cardiovascular: Normal rate, regular rhythm, S2 normal and normal heart sounds. Exam reveals no gallop, no S3 and no friction rub.   No murmur heard.  Pulmonary/Chest: Effort normal and breath sounds normal. No respiratory distress. She exhibits no tenderness.   Abdominal: Normal appearance and bowel sounds are normal. She exhibits no distension, no ascites and no mass. There is no splenomegaly or hepatomegaly. There is no tenderness. There is no rigidity, no rebound and no guarding. No hernia.     Vascular Status -  Her right foot exhibits no edema. Her left foot exhibits no edema.  Lymphadenopathy:     She has no cervical adenopathy.        Left: No supraclavicular adenopathy present.   Neurological: She is alert and oriented to person, place, and time. She has normal strength. No cranial nerve deficit or sensory deficit.   Skin: No rash noted. She is not diaphoretic. No cyanosis. No pallor. Nails show no clubbing.   Psychiatric: She has a normal mood and affect.   Nursing note and vitals reviewed.  Stigmata of chronic liver disease:  None.  Asterixis:  None.    Procedures:  Upon review of records:    EGD dated 1/13/2015 reveals esophagitis.  Erosive erythematous gastritis.  Evidence of healed ulceration.  Small polypoid area noted.  Duodenum  second portion biopsy reveals no pathologic alterations.  Negative for celiac disease.  Negative for microorganisms, inflammation or atypia.  Antrum and body biopsy reveals reactive gastropathy, chemical gastropathy.  No H. pylori identified.  Intestinal metaplasia without dysplasia.  Negative for atrophy, ulceration or malignancy.     Colonoscopy dated 3/11/2013 reveals colon polyp.  Scant vascular ectasia.  Left-sided diverticulosis.  Internal hemorrhoids.  Transverse colon polyp biopsy reveals tubular adenoma, no evidence of high-grade dysplasia.  Cecal polyp biopsy reveals tubular adenoma, no evidence of high-grade dysplasia.  Descending colon polyp reveals tubular adenoma, no evidence of high-grade dysplasia.  Distal descending colon polyp biopsy reveals tubular adenoma, no evidence of high-grade dysplasia.    Colonoscopy dated 6/21/2016 reveals left-sided diverticulosis.  Colon polyps including flat lesion in ascending colon.  Internal hemorrhoids.  Ascending colon polyp biopsy reveals sessile serrated adenoma with focal low-grade conventional adenomatous change.  Transverse colon polyp biopsy reveals sessile serrated adenoma.  Tubular adenoma.  Colon splenic flexure polyp biopsy reveals tubular adenoma.  Descending colon polyp biopsy reveals tubular adenoma.  Sigmoid colon polyp biopsy reveals hyperplastic polyps.    Assessment:      ICD-10-CM ICD-9-CM   1. Colon cancer screening Z12.11 V76.51   2. Heartburn R12 787.1     Plan/  Patient Instructions   1. Antireflux measures: Avoid fried, fatty foods, alcohol, chocolate, coffee, tea,  soft drinks, peppermint and spearmint, spicy foods, tomatoes and tomato based foods, onion based foods, and smoking. Other antireflux measures include weight reduction if overweight, avoiding tight clothing around the abdomen, elevating the head of the bed 6 inches with blocks under the head board, and don't drink or eat before going to bed and avoid lying down immediately after  meals.  2. Pantoprazole 40 mg 1 tablet by mouth in the am 30 minutes before breakfast.  3. Colonoscopy: Description of the procedure, risks, benefits, alternatives and options, including nonoperative options, were discussed with the patient in detail. The patient understands and wishes to proceed.     REGINA Peng

## 2019-07-03 ENCOUNTER — HOSPITAL ENCOUNTER (OUTPATIENT)
Dept: MAMMOGRAPHY | Facility: HOSPITAL | Age: 74
Discharge: HOME | End: 2019-07-03
Admitting: RADIOLOGY

## 2019-07-03 ENCOUNTER — HOSPITAL ENCOUNTER (OUTPATIENT)
Dept: ULTRASOUND IMAGING | Facility: HOSPITAL | Age: 74
Discharge: HOME | End: 2019-07-03

## 2019-07-03 DIAGNOSIS — R92.8 ABNORMAL MAMMOGRAM: ICD-10-CM

## 2019-07-03 PROCEDURE — G0279 TOMOSYNTHESIS, MAMMO: HCPCS | Performed by: RADIOLOGY

## 2019-07-03 PROCEDURE — 77065 DX MAMMO INCL CAD UNI: CPT

## 2019-07-03 PROCEDURE — G0279 TOMOSYNTHESIS, MAMMO: HCPCS

## 2019-07-03 PROCEDURE — 76642 ULTRASOUND BREAST LIMITED: CPT

## 2019-07-03 PROCEDURE — 77065 DX MAMMO INCL CAD UNI: CPT | Performed by: RADIOLOGY

## 2019-07-03 PROCEDURE — 76642 ULTRASOUND BREAST LIMITED: CPT | Performed by: RADIOLOGY

## 2019-07-07 DIAGNOSIS — N64.59 INVERSION OF NIPPLE: Primary | ICD-10-CM

## 2019-07-10 PROBLEM — Z12.11 COLON CANCER SCREENING: Status: ACTIVE | Noted: 2019-07-10

## 2019-07-22 RX ORDER — CLONAZEPAM 1 MG/1
1 TABLET ORAL NIGHTLY PRN
Qty: 30 TABLET | Refills: 3 | Status: SHIPPED | OUTPATIENT
Start: 2019-07-22 | End: 2019-10-19 | Stop reason: SDUPTHER

## 2019-07-22 NOTE — TELEPHONE ENCOUNTER
Pt canceled her appt for June, doesn't have another follow up scheduled. KAUSHAL hardy. Please advise

## 2019-07-24 ENCOUNTER — ANESTHESIA (OUTPATIENT)
Dept: GASTROENTEROLOGY | Facility: HOSPITAL | Age: 74
End: 2019-07-24

## 2019-07-24 ENCOUNTER — HOSPITAL ENCOUNTER (OUTPATIENT)
Facility: HOSPITAL | Age: 74
Setting detail: HOSPITAL OUTPATIENT SURGERY
Discharge: HOME | End: 2019-07-24
Attending: INTERNAL MEDICINE | Admitting: INTERNAL MEDICINE

## 2019-07-24 ENCOUNTER — ANESTHESIA EVENT (OUTPATIENT)
Dept: GASTROENTEROLOGY | Facility: HOSPITAL | Age: 74
End: 2019-07-24

## 2019-07-24 VITALS
TEMPERATURE: 98.1 F | HEIGHT: 65 IN | SYSTOLIC BLOOD PRESSURE: 116 MMHG | RESPIRATION RATE: 16 BRPM | HEART RATE: 68 BPM | OXYGEN SATURATION: 94 % | DIASTOLIC BLOOD PRESSURE: 70 MMHG | BODY MASS INDEX: 20.49 KG/M2 | WEIGHT: 123 LBS

## 2019-07-24 DIAGNOSIS — Z12.11 COLON CANCER SCREENING: ICD-10-CM

## 2019-07-24 PROCEDURE — 45385 COLONOSCOPY W/LESION REMOVAL: CPT | Performed by: INTERNAL MEDICINE

## 2019-07-24 PROCEDURE — 25010000002 FENTANYL CITRATE (PF) 100 MCG/2ML SOLUTION: Performed by: NURSE ANESTHETIST, CERTIFIED REGISTERED

## 2019-07-24 PROCEDURE — 25010000002 MIDAZOLAM PER 1 MG: Performed by: NURSE ANESTHETIST, CERTIFIED REGISTERED

## 2019-07-24 PROCEDURE — 45380 COLONOSCOPY AND BIOPSY: CPT | Performed by: INTERNAL MEDICINE

## 2019-07-24 PROCEDURE — 25010000002 PROPOFOL 1000 MG/100ML EMULSION: Performed by: NURSE ANESTHETIST, CERTIFIED REGISTERED

## 2019-07-24 RX ORDER — MIDAZOLAM HYDROCHLORIDE 1 MG/ML
INJECTION INTRAMUSCULAR; INTRAVENOUS AS NEEDED
Status: DISCONTINUED | OUTPATIENT
Start: 2019-07-24 | End: 2019-07-24 | Stop reason: SURG

## 2019-07-24 RX ORDER — SODIUM CHLORIDE 0.9 % (FLUSH) 0.9 %
3 SYRINGE (ML) INJECTION AS NEEDED
Status: DISCONTINUED | OUTPATIENT
Start: 2019-07-24 | End: 2019-07-24 | Stop reason: HOSPADM

## 2019-07-24 RX ORDER — PROPOFOL 10 MG/ML
INJECTION, EMULSION INTRAVENOUS AS NEEDED
Status: DISCONTINUED | OUTPATIENT
Start: 2019-07-24 | End: 2019-07-24 | Stop reason: SURG

## 2019-07-24 RX ORDER — LIDOCAINE 50 MG/G
OINTMENT TOPICAL AS NEEDED
Status: DISCONTINUED | OUTPATIENT
Start: 2019-07-24 | End: 2019-07-24 | Stop reason: HOSPADM

## 2019-07-24 RX ORDER — SIMETHICONE 20 MG/.3ML
EMULSION ORAL AS NEEDED
Status: DISCONTINUED | OUTPATIENT
Start: 2019-07-24 | End: 2019-07-24 | Stop reason: HOSPADM

## 2019-07-24 RX ORDER — FENTANYL CITRATE 50 UG/ML
INJECTION, SOLUTION INTRAMUSCULAR; INTRAVENOUS AS NEEDED
Status: DISCONTINUED | OUTPATIENT
Start: 2019-07-24 | End: 2019-07-24 | Stop reason: SURG

## 2019-07-24 RX ORDER — SODIUM CHLORIDE 9 MG/ML
70 INJECTION, SOLUTION INTRAVENOUS CONTINUOUS PRN
Status: DISCONTINUED | OUTPATIENT
Start: 2019-07-24 | End: 2019-07-24 | Stop reason: HOSPADM

## 2019-07-24 RX ADMIN — PROPOFOL 50 MG: 10 INJECTION, EMULSION INTRAVENOUS at 10:00

## 2019-07-24 RX ADMIN — FENTANYL CITRATE 100 MCG: 50 INJECTION, SOLUTION INTRAMUSCULAR; INTRAVENOUS at 09:30

## 2019-07-24 RX ADMIN — SODIUM CHLORIDE 70 ML/HR: 9 INJECTION, SOLUTION INTRAVENOUS at 08:40

## 2019-07-24 RX ADMIN — PROPOFOL 50 MG: 10 INJECTION, EMULSION INTRAVENOUS at 10:20

## 2019-07-24 RX ADMIN — MIDAZOLAM HYDROCHLORIDE 2 MG: 1 INJECTION, SOLUTION INTRAMUSCULAR; INTRAVENOUS at 09:30

## 2019-07-24 RX ADMIN — PROPOFOL 50 MG: 10 INJECTION, EMULSION INTRAVENOUS at 09:45

## 2019-07-24 RX ADMIN — PROPOFOL 50 MG: 10 INJECTION, EMULSION INTRAVENOUS at 09:30

## 2019-07-24 NOTE — ANESTHESIA PREPROCEDURE EVALUATION
Anesthesia Evaluation     history of anesthetic complications: PONV  NPO Solid Status: > 8 hours  NPO Liquid Status: > 8 hours           Airway   Mallampati: II  TM distance: >3 FB  Neck ROM: full  No difficulty expected  Dental - normal exam     Pulmonary - normal exam   (+) COPD mild, asthma, shortness of breath,   Cardiovascular - normal exam  Exercise tolerance: good (4-7 METS)    (+) hypertension well controlled less than 2 medications, dysrhythmias, pericardial effusion, hyperlipidemia,       Neuro/Psych  (+) headaches,     GI/Hepatic/Renal/Endo    (+)  GERD well controlled,  liver disease fatty liver disease,     Musculoskeletal     (+) back pain,   Abdominal  - normal exam   Substance History      OB/GYN          Other   (+) arthritis   history of cancer                    Anesthesia Plan    ASA 3     MAC     intravenous induction   Anesthetic plan, all risks, benefits, and alternatives have been provided, discussed and informed consent has been obtained with: patient.    Plan discussed with CRNA.

## 2019-07-24 NOTE — DISCHARGE INSTRUCTIONS
Rest today  No pushing,pulling,tugging,heavy lifting, or strenuous activity   No major decision making,driving,or drinking alcoholic beverages for 24 hours due to the sedation you received  Always use good hand hygiene/washing technique  No driving on pain medication.    To assist you in voiding:  Drink plenty of fluids  Listen to running water while attempting to void.    If you are unable to urinate and you have an uncomfortable urge to void or it has been   6 hours since you were discharged, return to the Emergency Room.    ************************************************************************************    Postprocedure instructions:    1. Nothing by mouth to fully alert.  2. Once fully alert may have clear liquid diet.  3. Advance diet as tolerated.  4. Vital signs as routine.    Diet:     1. Low-fat diet.  2. [place] All Bran-Bran Buds 1/4 cup daily.  3. May add Honey Bunches of Oats with Almonds 1/4 cup for taste.  4. Use almond milk, 1 or 2% milk.  5. Drink liberal amounts of water.    Blood Thinner Directions:    Avoid Aspirin & other NSAIDS for _7__ days. Tylenol is okay.    Treatments:  If still constipated may use over-the-counter [place] magnesium caplet. Take one caplet one orally at night.    Other Instructions:    Call [facility] at [phone] or come to the Emergency Department if you experience the following: Chest pain, abdominal pain, bleeding (vomiting of blood or coffee colored material, black stools or mari blood in stools), fever/chills, nausea and vomiting or dizziness.      Follow-up:  DR. CHIRAG NUNEZ in 4 weeks.Office phone # [phone].    Follow-up colonoscopy: in 3 years.    ************************************************************************************    Notes to the patient and the family from Dr. Nunez.    Dear patient/family member,    Today your colon was examined extensively from rectum to cecum and beyond into the small intestine twice.   Findings  on today's procedure are as follows:    1. Diverticulosis. These are benign outpouchings in the colon.   2. Colon polyps. 11 were found and removed.   3. No cancer. No inflammation or colitis. No suggestion of Crohn's disease.  4. Internal hemorrhoids.    Recommendations:    1. As above.  2. Use CORTIZONE 10 OINTMENT (hydrocortisone 1% ointment) over the counter. AVOID CREAM OR GEL.  Apply anorectally  2-3 times a day for 1 week.  May need to use a liner to protect the garments.        Should you have more questions please do not hesitate to talk to the nurse who can call me and let me talk to you.      I hope you feel better.    Goyo Nunez M.D., FACP, FACG.

## 2019-07-24 NOTE — OP NOTE
PROCEDURE:  Colonoscopy to the terminal ileum with       DATE OF PROCEDURE:  July 24, 2019    REFERRING PROVIDER:  Paul Quinteros MD     INSTRUMENT USED:  Olympus PCF H 190 videocolonoscope.      INDICATIONS OF THE PROCEDURE: Colon cancer screening.    PREVIOUS COLONOSCOPY: 2016.  Multiple colon polyps and flat lesion in the ascending colon.    BIOPSIES: Ascending colon: One cold biopsy polypectomy.  One cold snare polypectomy (not retrieved).  Transverse colon: 2 cold biopsy polypectomies.  Descending colon: One hot snare polypectomy.  Sigmoid colon: 1 cold biopsy polypectomy.  Rectum: 5 cold biopsy polypectomies.      PHOTOGRAPHS:  Photographs were included in the medical records.     MEDICATIONS:  MAC.       CONSENT/PREPROCEDURE EVALUATION:  Risks, benefits, alternatives and options of the procedure including risks of sedation/anesthesia were discussed with the patient and informed consent was obtained prior to the procedure.  History and physical examination were performed and nothing precluded the test.      REPORT:  The patient was placed in left lateral decubitus position and a digital examination was performed.  Once under the influence of IV sedation, the instrument was inserted into the rectum and advanced under direct vision to cecum which was identified by the ileocecal valve, triradiate folds and appendiceal orifice. The scope was then maneuvered into the terminal ileum.        FINDINGS:      Digital rectal examination:  Good anal tone.  No perianal pathology.  No mass.        Terminal ileum:  7-8 cm.  Normal.     Cecum and ascending colon: A 3 mm sessile polyp was removed with cold biopsy forceps in the ascending colon.  A 5 mm sessile polyp was removed with cold snare.  However this could not be retrieved.  Scant diverticulosis involving the right colon.     Hepatic flexure, transverse colon, splenic flexure: Scant diverticulosis involving the transverse colon.  2, 3 mm sessile polyps in the  transverse colon were removed with biopsy forceps.         Descending colon, sigmoid colon and rectum: Diverticulosis.  An 8 mm sessile polyp in the descending colon was removed with hot snare.  In the sigmoid colon a 3 mm sessile polyp was removed with cold biopsy forceps.  In the rectum 5 small 3 mm size polyps were removed with cold biopsy forceps.  A retroflex examination within the rectum revealed internal hemorrhoids.        The scope was then straightened, the lower GI tract was decompressed, and the scope was pulled out of the patient.  The patient tolerated the procedure well.  There were no immediate complications and the patient was transferred in stable condition for post procedure observation.      TECHNICAL DATA:   1. Henderson prep score: 8 (3+2+3).    2. Anus to cecal time: 4  minutes.  3. Difficulty of examination:  Average.  The colon was noted to be significantly tortuous, redundant and spastic.  4. Withdrawal time: 25 minutes.  5. Procedure time: 1 hour and 1 minute.  6. Retroflex examination in right colon: Yes.    7. Second look Rectum to cecum with decompression: Yes.    DIAGNOSES:    1. Pandiverticulosis more pronounced in the left colon as compared to the transverse colon and right colon.  2. Colon polyps.  3-8 mm in size.  11 were removed.     3. Internal hemorrhoids.    RECOMMENDATIONS:     1. Dietary instructions.  2. Follow biopsies.    3. Follow-up:      4. Followup colonoscopy:  3 years in view of multiple colon polyps significantly tortuous redundant and spastic colon.       Thank you very much for letting me participate in the care of this patient. Please do not hesitate to call me if you have any questions.

## 2019-07-24 NOTE — ADDENDUM NOTE
Addendum  created 07/24/19 1142 by Tanmay Carranza CRNA    Intraprocedure Event edited, Intraprocedure Staff edited

## 2019-07-24 NOTE — INTERVAL H&P NOTE
"  H&P reviewed. The patient was examined and there are no changes to the H&P.       No recent shortness of breath or chest pains.      Blood pressure 103/63, pulse 89, temperature 99 °F (37.2 °C), temperature source Temporal, resp. rate 18, height 165.1 cm (65\"), weight 55.8 kg (123 lb), SpO2 91 %, not currently breastfeeding.    Chest: clear to auscultation.  Cardiac exam: No S3 no murmurs.   Abdomen: soft bowel sounds present nondistended nontender.        "

## 2019-07-24 NOTE — ANESTHESIA POSTPROCEDURE EVALUATION
Patient: Gabi Dudley    Procedure Summary     Date:  07/24/19 Room / Location:  [facility] ENDOSCOPY 2 / [facility] ENDOSCOPY    Anesthesia Start:  0933 Anesthesia Stop:  1105    Procedure:  COLONOSCOPY W/ COLD FORCEP POLYPECTOMIES; HOT SNARE POLYPECTOMIES; COLD SNARE POLYPECTOMY (N/A Anus) Diagnosis:       Diverticulosis of colon      Colon polyps      Internal hemorrhoid      (Colon cancer screening [Z12.11])    Surgeon:  Goyo Nunez MD Provider:  Tanmay Carranza CRNA    Anesthesia Type:  MAC ASA Status:  3          Anesthesia Type: MAC  Last vitals  BP   117/91 (07/24/19 1050)   Temp   98.1 °F (36.7 °C) (07/24/19 1050)   Pulse   80 (07/24/19 1050)   Resp   16 (07/24/19 1050)     SpO2   95 % (07/24/19 1050)     Post Anesthesia Care and Evaluation    Patient location during evaluation: bedside  Patient participation: complete - patient participated  Level of consciousness: awake  Pain score: 0  Pain management: adequate  Airway patency: patent  Anesthetic complications: No anesthetic complications  PONV Status: controlled  Cardiovascular status: acceptable and stable  Respiratory status: acceptable and room air  Hydration status: acceptable

## 2019-08-07 LAB
LAB AP CASE REPORT: NORMAL
PATH REPORT.ADDENDUM SPEC: NORMAL
PATH REPORT.FINAL DX SPEC: NORMAL

## 2019-08-14 RX ORDER — ALBUTEROL SULFATE 90 UG/1
AEROSOL, METERED RESPIRATORY (INHALATION)
Qty: 18 G | Refills: 5 | Status: SHIPPED | OUTPATIENT
Start: 2019-08-14 | End: 2020-10-05 | Stop reason: SDUPTHER

## 2019-09-05 RX ORDER — SERTRALINE HYDROCHLORIDE 100 MG/1
TABLET, FILM COATED ORAL
Qty: 90 TABLET | Refills: 0 | Status: SHIPPED | OUTPATIENT
Start: 2019-09-05 | End: 2019-10-19 | Stop reason: SDUPTHER

## 2019-09-18 RX ORDER — TRAZODONE HYDROCHLORIDE 50 MG/1
TABLET ORAL
Qty: 30 TABLET | Refills: 0 | Status: SHIPPED | OUTPATIENT
Start: 2019-09-18 | End: 2019-10-19 | Stop reason: SDUPTHER

## 2019-10-19 ENCOUNTER — OFFICE VISIT (OUTPATIENT)
Dept: INTERNAL MEDICINE | Facility: CLINIC | Age: 74
End: 2019-10-19

## 2019-10-19 VITALS
WEIGHT: 121 LBS | SYSTOLIC BLOOD PRESSURE: 108 MMHG | TEMPERATURE: 97 F | BODY MASS INDEX: 20.16 KG/M2 | HEIGHT: 65 IN | DIASTOLIC BLOOD PRESSURE: 82 MMHG | OXYGEN SATURATION: 96 % | HEART RATE: 93 BPM | RESPIRATION RATE: 18 BRPM

## 2019-10-19 DIAGNOSIS — J43.9 PULMONARY EMPHYSEMA, UNSPECIFIED EMPHYSEMA TYPE (HCC): ICD-10-CM

## 2019-10-19 DIAGNOSIS — M79.7 FIBROMYALGIA: ICD-10-CM

## 2019-10-19 DIAGNOSIS — E78.5 HYPERLIPIDEMIA, UNSPECIFIED HYPERLIPIDEMIA TYPE: Primary | ICD-10-CM

## 2019-10-19 DIAGNOSIS — M81.0 OSTEOPOROSIS, UNSPECIFIED OSTEOPOROSIS TYPE, UNSPECIFIED PATHOLOGICAL FRACTURE PRESENCE: ICD-10-CM

## 2019-10-19 DIAGNOSIS — I31.39 PERICARDIAL EFFUSION: ICD-10-CM

## 2019-10-19 DIAGNOSIS — N64.59 INVERSION OF NIPPLE: ICD-10-CM

## 2019-10-19 DIAGNOSIS — M17.9 OSTEOARTHRITIS OF KNEE, UNSPECIFIED LATERALITY, UNSPECIFIED OSTEOARTHRITIS TYPE: ICD-10-CM

## 2019-10-19 DIAGNOSIS — F41.9 ANXIETY: ICD-10-CM

## 2019-10-19 DIAGNOSIS — G47.00 INSOMNIA, UNSPECIFIED TYPE: ICD-10-CM

## 2019-10-19 DIAGNOSIS — I10 ESSENTIAL HYPERTENSION: ICD-10-CM

## 2019-10-19 DIAGNOSIS — G44.209 TENSION HEADACHE: ICD-10-CM

## 2019-10-19 DIAGNOSIS — D35.00 ADRENAL ADENOMA, UNSPECIFIED LATERALITY: ICD-10-CM

## 2019-10-19 DIAGNOSIS — E55.9 VITAMIN D DEFICIENCY: ICD-10-CM

## 2019-10-19 DIAGNOSIS — I48.91 ATRIAL FIBRILLATION, UNSPECIFIED TYPE (HCC): ICD-10-CM

## 2019-10-19 PROBLEM — Z12.11 COLON CANCER SCREENING: Status: RESOLVED | Noted: 2019-07-10 | Resolved: 2019-10-19

## 2019-10-19 PROCEDURE — G0008 ADMIN INFLUENZA VIRUS VAC: HCPCS | Performed by: INTERNAL MEDICINE

## 2019-10-19 PROCEDURE — 99214 OFFICE O/P EST MOD 30 MIN: CPT | Performed by: INTERNAL MEDICINE

## 2019-10-19 PROCEDURE — 90653 IIV ADJUVANT VACCINE IM: CPT | Performed by: INTERNAL MEDICINE

## 2019-10-19 RX ORDER — CLONAZEPAM 1 MG/1
TABLET ORAL
Qty: 30 TABLET | Refills: 1 | Status: SHIPPED | OUTPATIENT
Start: 2019-10-19 | End: 2020-05-22

## 2019-10-19 RX ORDER — SERTRALINE HYDROCHLORIDE 100 MG/1
TABLET, FILM COATED ORAL
Qty: 135 TABLET | Refills: 1 | Status: SHIPPED | OUTPATIENT
Start: 2019-10-19 | End: 2020-03-05 | Stop reason: SDUPTHER

## 2019-10-19 RX ORDER — AZELASTINE HYDROCHLORIDE 137 UG/1
1 SPRAY, METERED NASAL 2 TIMES DAILY
Qty: 30 ML | Refills: 1 | Status: SHIPPED | OUTPATIENT
Start: 2019-10-19 | End: 2021-11-24 | Stop reason: SDUPTHER

## 2019-10-19 RX ORDER — TRAZODONE HYDROCHLORIDE 100 MG/1
100 TABLET ORAL EVERY EVENING
Qty: 30 TABLET | Refills: 1 | Status: SHIPPED | OUTPATIENT
Start: 2019-10-19 | End: 2019-12-11 | Stop reason: SDUPTHER

## 2019-10-19 NOTE — PROGRESS NOTES
Subjective   Gabi Dudley is a 74 y.o. female.     Chief Complaint   Patient presents with   • Follow-up     1 mo f/u for HLD, HTN.       History of Present Illness   Patient here for follow-up of.  Patient still feels very anxious.  Patient is taking clonazepam 1 mg daily usually at nighttime.  Patient requests take clonazepam in the daytime and then increase trazodone to 100 mg.  Patient actually was told to take daytime only as needed in the past.  Patient is taking medicine basically daily.  Patient still has a lot of anxiety.  Also complains nasal congestion runny nose.  Over-the-counter medicines Zyrtec Claritin Flonase no significant help.  Patient refused to see ENT doctor for sinus polyp.  Fibromyalgia on Lortab patient is seeing pain clinic.  Hypertension stable without medication.  Hyperlipidemia stable on medication.  Osteoporosis on medication stable.  COPD patient requests nebulizer to be refilled.  Nipple retraction mammogram no significant disease.  Patient still smokes.  Atrial fibrillation patient seen by  cardiologist.    Current Outpatient Medications:   •  albuterol (ACCUNEB) 1.25 MG/3ML nebulizer solution, 1 ampule Q6H. J44.1- J96.21, Disp: 30 vial, Rfl: 3  •  albuterol (PROVENTIL HFA;VENTOLIN HFA) 108 (90 Base) MCG/ACT inhaler, Inhale 2 puffs Every 4 (Four) Hours As Needed for Wheezing., Disp: 1 inhaler, Rfl: 5  •  amoxicillin-clavulanate (AUGMENTIN) 875-125 MG per tablet, Take 1 tablet by mouth Every 12 (Twelve) Hours for 7 days., Disp: 14 tablet, Rfl: 0  •  aspirin 81 MG EC tablet, Take 81 mg by mouth Daily., Disp: , Rfl:   •  atorvastatin (LIPITOR) 20 MG tablet, Take 1 tablet by mouth Daily., Disp: 90 tablet, Rfl: 3  •  budesonide-formoterol (SYMBICORT) 160-4.5 MCG/ACT inhaler, Inhale 2 puffs 2 (Two) Times a Day., Disp: 1 inhaler, Rfl: 12  •  clonazePAM (KlonoPIN) 1 MG tablet, Take 1 tablet by mouth At Night As Needed (anxiety)., Disp: 30 tablet, Rfl: 3  •  cyanocobalamin 1000 MCG/ML  injection, Inject 1 mL into the appropriate muscle as directed by prescriber Every 28 (Twenty-Eight) Days for 280 days., Disp: 10 mL, Rfl: 0  •  DULoxetine (CYMBALTA) 60 MG capsule, take 1 capsule by mouth twice a day, Disp: 60 capsule, Rfl: 2  •  fluticasone (FLONASE) 50 MCG/ACT nasal spray, instill 1 spray into each nostril once daily, Disp: 48 g, Rfl: 6  •  HYDROcodone-acetaminophen (NORCO)  MG per tablet, take 1 tablet by mouth three times a day, Disp: , Rfl: 0  •  loratadine (CLARITIN) 10 MG tablet, Take  by mouth., Disp: , Rfl:   •  metoclopramide (REGLAN) 5 MG tablet, See Admin Instructions., Disp: , Rfl: 0  •  pantoprazole (PROTONIX) 40 MG EC tablet, take 1 tablet by mouth every morning 30 MINUTES BEFORE BREAKFAST, Disp: 30 tablet, Rfl: 5  •  predniSONE (DELTASONE) 10 MG (21) tablet pack, Daily taper- 6/5/4/3/2/1, Disp: 21 tablet, Rfl: 0  •  RA LAXATIVE 5 MG EC tablet, See Admin Instructions., Disp: , Rfl: 0  •  RA MAGNESIUM CITRATE 1.745 GM/30ML solution solution, See Admin Instructions., Disp: , Rfl: 0  •  sertraline (ZOLOFT) 100 MG tablet, take 1 tablet by mouth once daily, Disp: 90 tablet, Rfl: 0  •  traZODone (DESYREL) 50 MG tablet, take 1 tablet by mouth every evening, Disp: 30 tablet, Rfl: 0  •  VENTOLIN  (90 Base) MCG/ACT inhaler, inhale 2 puffs by mouth every 4 hours if needed for wheezing, Disp: 18 g, Rfl: 5    The following portions of the patient's history were reviewed and updated as appropriate: allergies, current medications, past family history, past medical history, past social history, past surgical history and problem list.    Review of Systems   Constitutional: Negative.    HENT: Positive for congestion, rhinorrhea and sinus pressure.    Respiratory: Positive for shortness of breath.    Cardiovascular: Negative.  Negative for chest pain, palpitations and leg swelling.   Gastrointestinal: Negative.    Musculoskeletal: Positive for arthralgias and myalgias.   Skin: Negative.     Neurological: Negative.    Psychiatric/Behavioral: Positive for agitation and sleep disturbance. The patient is nervous/anxious.        Objective   Physical Exam   Constitutional: She is oriented to person, place, and time. She appears well-developed and well-nourished.   HENT:   Head: Normocephalic and atraumatic.   TM cloudy   Neck: Neck supple.   Cardiovascular: Normal rate, regular rhythm and normal heart sounds.   Pulmonary/Chest: Effort normal and breath sounds normal.   Abdominal: Bowel sounds are normal.   Musculoskeletal: She exhibits tenderness.   Neurological: She is alert and oriented to person, place, and time.   Skin: Skin is warm.   Psychiatric:   anxious       All tests have been reviewed.    Assessment/Plan   There are no diagnoses linked to this encounter.            tob, CT lung 2014, lung nodule, scarring and emphysema. Stable low dose CT repeat 4/29/19  hyperglycemia, continue diet  vitD low continue vitD3 1000u daily  anxiety continue zoloft 100mg daily increased to 150 mg daily.   and klonopin 1mg, 1/2 tab po prn , again emphasized as needed basis.  Continue cymbalta 60 bid.  Patient refused to take BuSpar or see psychiatrist.  CT scan showed liver cyst, large kidney cyst, adrenal gland adenoma, kidney stones non-obstructing. repeat showed no change 1/2018  right knee OA on xray aleve prn knee brace help  hip pain s/p steroid helped improved  Hyperlipidemia stable on medicine continue need a blood tests  Hypertension normal without med,   Osteoporosis continue medication, refuse dexa   heme+, Colon polyps repeat 6/2019. EGD done  gastritis continue protonix  COPD continue medication. need to Quit tobacco. tudorsa, proair, continue O2 prn, symbicort refill nebulizer  Insomnia continue trazodone 100mg   Fibromyalgia stable watch continue medicine and follow up with pain clinic,with lortab  VacUTD, pneumovax again 9/14/17, in pharmacy flu shot today  Tension HA continue flexeril and  naproxen  allergy flonase continue  sinus polyp per dentist, obtain rec.  ref to ENT.patient refuses, azelastine and Claritin D 5 mg in the morning  Nipple retraction mamm and US:negative pathology  tob to quit,   Cysts in liver and kidney, watch  Kidney lesion renal image CT renal protocol cyst and stone non obstructing  A.fib and pericardia eff, follow cardio patient to schedule  Left adrenal tu benign per radilogist   Liver cyst on CT watch  Right neck SK watch for now, patient to call if increase in size or change in color  Colon 7/24/19, repeat in 3 years  1 mo after labs

## 2019-10-24 ENCOUNTER — OFFICE VISIT (OUTPATIENT)
Dept: GASTROENTEROLOGY | Facility: CLINIC | Age: 74
End: 2019-10-24

## 2019-10-24 VITALS
SYSTOLIC BLOOD PRESSURE: 138 MMHG | WEIGHT: 124 LBS | RESPIRATION RATE: 20 BRPM | HEART RATE: 95 BPM | DIASTOLIC BLOOD PRESSURE: 85 MMHG | BODY MASS INDEX: 20.66 KG/M2 | TEMPERATURE: 97.3 F | HEIGHT: 65 IN

## 2019-10-24 DIAGNOSIS — R12 HEARTBURN: ICD-10-CM

## 2019-10-24 DIAGNOSIS — R21 SKIN RASH: ICD-10-CM

## 2019-10-24 DIAGNOSIS — R10.31 RIGHT LOWER QUADRANT ABDOMINAL PAIN: Primary | ICD-10-CM

## 2019-10-24 DIAGNOSIS — R14.0 ABDOMINAL DISTENTION: ICD-10-CM

## 2019-10-24 DIAGNOSIS — R14.0 BLOATING: ICD-10-CM

## 2019-10-24 PROCEDURE — 99214 OFFICE O/P EST MOD 30 MIN: CPT | Performed by: INTERNAL MEDICINE

## 2019-10-24 PROCEDURE — 99406 BEHAV CHNG SMOKING 3-10 MIN: CPT | Performed by: INTERNAL MEDICINE

## 2019-10-28 ENCOUNTER — TELEPHONE (OUTPATIENT)
Dept: INTERNAL MEDICINE | Facility: CLINIC | Age: 74
End: 2019-10-28

## 2019-10-28 NOTE — TELEPHONE ENCOUNTER
Pt called and stated that she has been on antibiotics for possible pneumonia but has now turned into a yeast infection, pt is wondering if you can call in some Diflucan to help with this? She uses [facility] [place].

## 2019-10-29 RX ORDER — FLUCONAZOLE 50 MG/1
50 TABLET ORAL DAILY
Qty: 1 TABLET | Refills: 0 | Status: SHIPPED | OUTPATIENT
Start: 2019-10-29 | End: 2020-01-07

## 2019-10-29 NOTE — TELEPHONE ENCOUNTER
Pt's daughter called back and stated that her mother has an ear infection in her right ear. She is having pain in the right ear and vertigo. She is requesting amoxicillin as the Augmentin was harsh on her stomach.    Her pharmacy is the [facility] the [facility].

## 2019-10-30 NOTE — TELEPHONE ENCOUNTER
Malu call and said the pharmacy has not received a prescription for the Amoxacillin for the pt. Malu is requesting it be resent.

## 2019-10-31 RX ORDER — AMOXICILLIN 500 MG/1
500 CAPSULE ORAL 3 TIMES DAILY
Qty: 21 CAPSULE | Refills: 0 | Status: SHIPPED | OUTPATIENT
Start: 2019-10-31 | End: 2020-01-07

## 2019-10-31 RX ORDER — AMOXICILLIN 500 MG/1
1000 CAPSULE ORAL 3 TIMES DAILY
Qty: 7 CAPSULE | Refills: 0 | Status: SHIPPED | OUTPATIENT
Start: 2019-10-31 | End: 2019-10-31 | Stop reason: SDUPTHER

## 2019-11-19 LAB
25(OH)D3+25(OH)D2 SERPL-MCNC: 14.7 NG/ML (ref 30–100)
ALBUMIN SERPL-MCNC: 4.5 G/DL (ref 3.5–5.2)
ALBUMIN/GLOB SERPL: 2 G/DL
ALP SERPL-CCNC: 79 U/L (ref 39–117)
ALT SERPL-CCNC: 17 U/L (ref 1–33)
APPEARANCE UR: CLEAR
AST SERPL-CCNC: 22 U/L (ref 1–32)
BACTERIA #/AREA URNS HPF: ABNORMAL /HPF
BASOPHILS # BLD AUTO: 0.04 10*3/MM3 (ref 0–0.2)
BASOPHILS NFR BLD AUTO: 0.6 % (ref 0–1.5)
BILIRUB SERPL-MCNC: 0.2 MG/DL (ref 0.2–1.2)
BILIRUB UR QL STRIP: NEGATIVE
BUN SERPL-MCNC: 25 MG/DL (ref 8–23)
BUN/CREAT SERPL: 32.9 (ref 7–25)
CALCIUM SERPL-MCNC: 9 MG/DL (ref 8.6–10.5)
CHLORIDE SERPL-SCNC: 102 MMOL/L (ref 98–107)
CHOLEST SERPL-MCNC: 178 MG/DL (ref 0–200)
CK SERPL-CCNC: 77 U/L (ref 20–180)
CO2 SERPL-SCNC: 30.9 MMOL/L (ref 22–29)
COLOR UR: ABNORMAL
CREAT SERPL-MCNC: 0.76 MG/DL (ref 0.57–1)
EOSINOPHIL # BLD AUTO: 0.04 10*3/MM3 (ref 0–0.4)
EOSINOPHIL NFR BLD AUTO: 0.6 % (ref 0.3–6.2)
EPI CELLS #/AREA URNS HPF: ABNORMAL /HPF
ERYTHROCYTE [DISTWIDTH] IN BLOOD BY AUTOMATED COUNT: 13.6 % (ref 12.3–15.4)
GLOBULIN SER CALC-MCNC: 2.2 GM/DL
GLUCOSE SERPL-MCNC: 99 MG/DL (ref 65–99)
GLUCOSE UR QL: NEGATIVE
HCT VFR BLD AUTO: 43.3 % (ref 34–46.6)
HDLC SERPL-MCNC: 74 MG/DL (ref 40–60)
HGB BLD-MCNC: 14.4 G/DL (ref 12–15.9)
HGB UR QL STRIP: NEGATIVE
IGA SERPL-MCNC: 199 MG/DL (ref 64–422)
IMM GRANULOCYTES # BLD AUTO: 0.02 10*3/MM3 (ref 0–0.05)
IMM GRANULOCYTES NFR BLD AUTO: 0.3 % (ref 0–0.5)
KETONES UR QL STRIP: ABNORMAL
LDLC SERPL CALC-MCNC: 93 MG/DL (ref 0–100)
LEUKOCYTE ESTERASE UR QL STRIP: ABNORMAL
LYMPHOCYTES # BLD AUTO: 1.33 10*3/MM3 (ref 0.7–3.1)
LYMPHOCYTES NFR BLD AUTO: 20.8 % (ref 19.6–45.3)
MCH RBC QN AUTO: 29.6 PG (ref 26.6–33)
MCHC RBC AUTO-ENTMCNC: 33.3 G/DL (ref 31.5–35.7)
MCV RBC AUTO: 88.9 FL (ref 79–97)
MONOCYTES # BLD AUTO: 0.33 10*3/MM3 (ref 0.1–0.9)
MONOCYTES NFR BLD AUTO: 5.2 % (ref 5–12)
NEUTROPHILS # BLD AUTO: 4.63 10*3/MM3 (ref 1.7–7)
NEUTROPHILS NFR BLD AUTO: 72.5 % (ref 42.7–76)
NITRITE UR QL STRIP: NEGATIVE
NRBC BLD AUTO-RTO: 0 /100 WBC (ref 0–0.2)
PH UR STRIP: 5.5 [PH] (ref 5–8)
PLATELET # BLD AUTO: 256 10*3/MM3 (ref 140–450)
POTASSIUM SERPL-SCNC: 4.4 MMOL/L (ref 3.5–5.2)
PROT SERPL-MCNC: 6.7 G/DL (ref 6–8.5)
PROT UR QL STRIP: ABNORMAL
RBC # BLD AUTO: 4.87 10*6/MM3 (ref 3.77–5.28)
RBC #/AREA URNS HPF: ABNORMAL /HPF
SODIUM SERPL-SCNC: 142 MMOL/L (ref 136–145)
SP GR UR: ABNORMAL (ref 1–1.03)
TRIGL SERPL-MCNC: 55 MG/DL (ref 0–150)
TSH SERPL DL<=0.005 MIU/L-ACNC: 2.03 UIU/ML (ref 0.27–4.2)
TTG IGA SER-ACNC: <2 U/ML (ref 0–3)
UROBILINOGEN UR STRIP-MCNC: ABNORMAL MG/DL
VLDLC SERPL CALC-MCNC: 11 MG/DL
WBC # BLD AUTO: 6.39 10*3/MM3 (ref 3.4–10.8)
WBC #/AREA URNS HPF: ABNORMAL /HPF

## 2019-11-19 RX ORDER — CLONAZEPAM 1 MG/1
TABLET ORAL
Qty: 120 TABLET | Refills: 0 | Status: SHIPPED | OUTPATIENT
Start: 2019-11-19 | End: 2020-01-07

## 2019-11-20 ENCOUNTER — OFFICE VISIT (OUTPATIENT)
Dept: INTERNAL MEDICINE | Facility: CLINIC | Age: 74
End: 2019-11-20

## 2019-11-20 VITALS
SYSTOLIC BLOOD PRESSURE: 118 MMHG | TEMPERATURE: 97 F | DIASTOLIC BLOOD PRESSURE: 82 MMHG | HEART RATE: 84 BPM | RESPIRATION RATE: 18 BRPM | WEIGHT: 123.12 LBS | BODY MASS INDEX: 20.51 KG/M2 | OXYGEN SATURATION: 93 % | HEIGHT: 65 IN

## 2019-11-20 DIAGNOSIS — J32.9 SINUSITIS, UNSPECIFIED CHRONICITY, UNSPECIFIED LOCATION: ICD-10-CM

## 2019-11-20 DIAGNOSIS — F41.9 ANXIETY: Primary | ICD-10-CM

## 2019-11-20 DIAGNOSIS — I10 ESSENTIAL HYPERTENSION: ICD-10-CM

## 2019-11-20 DIAGNOSIS — E78.5 HYPERLIPIDEMIA, UNSPECIFIED HYPERLIPIDEMIA TYPE: ICD-10-CM

## 2019-11-20 DIAGNOSIS — I48.91 ATRIAL FIBRILLATION, UNSPECIFIED TYPE (HCC): ICD-10-CM

## 2019-11-20 DIAGNOSIS — J43.9 PULMONARY EMPHYSEMA, UNSPECIFIED EMPHYSEMA TYPE (HCC): ICD-10-CM

## 2019-11-20 DIAGNOSIS — Z72.0 TOBACCO USE: ICD-10-CM

## 2019-11-20 PROCEDURE — 99214 OFFICE O/P EST MOD 30 MIN: CPT | Performed by: INTERNAL MEDICINE

## 2019-11-20 NOTE — PROGRESS NOTES
Subjective   Gabi Dudley is a 74 y.o. female.     Chief Complaint   Patient presents with   • Follow-up     1 mo f/u labs   • URI     pt c/o runny nose and a lot of sinus headaches.       History of Present Illness   Patient here for follow-up of.  Anxiety improved after increase Zoloft to 150 mg daily.  Blood pressure stable now.  Hyperlipidemia stable.  Tobacco use the patient states already cut down.  Emphysema stable now.  Atrial fibrillation patient yet to see a cardiologist.  Sinus congestion improved after medication.    Current Outpatient Medications:   •  albuterol (ACCUNEB) 1.25 MG/3ML nebulizer solution, 1 ampule Q6H. J44.1- J96.21, Disp: 30 vial, Rfl: 3  •  albuterol (PROVENTIL HFA;VENTOLIN HFA) 108 (90 Base) MCG/ACT inhaler, Inhale 2 puffs Every 4 (Four) Hours As Needed for Wheezing., Disp: 1 inhaler, Rfl: 5  •  amoxicillin (AMOXIL) 500 MG capsule, Take 1 capsule by mouth 3 (Three) Times a Day., Disp: 21 capsule, Rfl: 0  •  aspirin 81 MG EC tablet, Take 81 mg by mouth Daily., Disp: , Rfl:   •  atorvastatin (LIPITOR) 20 MG tablet, Take 1 tablet by mouth Daily., Disp: 90 tablet, Rfl: 3  •  Azelastine HCl 137 MCG/SPRAY solution, 1 spray into the nostril(s) as directed by provider 2 (Two) Times a Day., Disp: 30 mL, Rfl: 1  •  budesonide-formoterol (SYMBICORT) 160-4.5 MCG/ACT inhaler, Inhale 2 puffs 2 (Two) Times a Day., Disp: 1 inhaler, Rfl: 12  •  clonazePAM (KlonoPIN) 1 MG tablet, 1/2 tab bid po prn, Disp: 30 tablet, Rfl: 1  •  clonazePAM (KlonoPIN) 1 MG tablet, take 1 tablet by mouth every evening if needed for anxiety, Disp: 120 tablet, Rfl: 0  •  cyanocobalamin 1000 MCG/ML injection, Inject 1 mL into the appropriate muscle as directed by prescriber Every 28 (Twenty-Eight) Days for 280 days., Disp: 10 mL, Rfl: 0  •  DULoxetine (CYMBALTA) 60 MG capsule, take 1 capsule by mouth twice a day, Disp: 60 capsule, Rfl: 2  •  fluconazole (DIFLUCAN) 50 MG tablet, Take 1 tablet by mouth Daily., Disp: 1 tablet,  Rfl: 0  •  HYDROcodone-acetaminophen (NORCO)  MG per tablet, take 1 tablet by mouth three times a day, Disp: , Rfl: 0  •  loratadine (CLARITIN) 10 MG tablet, Take  by mouth., Disp: , Rfl:   •  pantoprazole (PROTONIX) 40 MG EC tablet, take 1 tablet by mouth every morning 30 MINUTES BEFORE BREAKFAST, Disp: 30 tablet, Rfl: 5  •  predniSONE (DELTASONE) 10 MG (21) tablet pack, Daily taper- 6/5/4/3/2/1, Disp: 21 tablet, Rfl: 0  •  sertraline (ZOLOFT) 100 MG tablet, 1 and 1/2 tab daily po, Disp: 135 tablet, Rfl: 1  •  traZODone (DESYREL) 100 MG tablet, Take 1 tablet by mouth Every Evening., Disp: 30 tablet, Rfl: 1  •  VENTOLIN  (90 Base) MCG/ACT inhaler, inhale 2 puffs by mouth every 4 hours if needed for wheezing, Disp: 18 g, Rfl: 5    The following portions of the patient's history were reviewed and updated as appropriate: allergies, current medications, past family history, past medical history, past social history, past surgical history and problem list.    Review of Systems   Constitutional: Negative.    HENT: Positive for congestion and rhinorrhea.    Eyes: Negative.    Respiratory: Negative.    Cardiovascular: Negative.    Gastrointestinal: Negative.    Endocrine: Negative.    Genitourinary: Negative.    Musculoskeletal: Negative.    Skin: Negative.    Allergic/Immunologic: Negative.    Neurological: Negative.    Hematological: Negative.    Psychiatric/Behavioral: Negative.        Objective   Physical Exam   Constitutional: She is oriented to person, place, and time. She appears well-developed and well-nourished.   Neck: Neck supple.   Cardiovascular: Normal rate, regular rhythm and normal heart sounds.   Pulmonary/Chest: Effort normal and breath sounds normal.   Abdominal: Bowel sounds are normal.   Neurological: She is alert and oriented to person, place, and time.   Skin: Skin is warm.   Psychiatric: She has a normal mood and affect.       All tests have been reviewed.    Assessment/Plan   Diagnoses  and all orders for this visit:    Anxiety continue medication    Essential hypertension continue medication    Hyperlipidemia, unspecified hyperlipidemia type continue medication    Tobacco use encourage patient to quit    Pulmonary emphysema, unspecified emphysema type (CMS/HCC) continue medication    Atrial fibrillation, unspecified type (CMS/HCC)  -     Ambulatory Referral to Cardiology    Sinusitis, unspecified chronicity, unspecified location continue medication      Follow-up in 3 months           tob, CT lung 2014, lung nodule, scarring and emphysema. Stable low dose CT repeat 4/29/19  hyperglycemia, continue diet  vitD low continue vitD3 1000u daily  anxiety continue zoloft 100mg daily increased to 150 mg daily.   and klonopin 1mg, 1/2 tab po prn , again emphasized as needed basis.  Continue cymbalta 60 bid.  Patient refused to take BuSpar or see psychiatrist.  CT scan showed liver cyst, large kidney cyst, adrenal gland adenoma, kidney stones non-obstructing. repeat showed no change 1/2018  right knee OA on xray aleve prn knee brace help  hip pain s/p steroid helped improved  Hyperlipidemia stable on medicine continue need a blood tests  Hypertension normal without med,   Osteoporosis continue medication, refuse dexa   heme+, Colon polyps repeat 6/2019. EGD done  gastritis continue protonix  COPD continue medication. need to Quit tobacco. tudorsa, proair, continue O2 prn, symbicort refill nebulizer  Insomnia continue trazodone 100mg   Fibromyalgia stable watch continue medicine and follow up with pain clinic,with lortab  VacUTD, pneumovax again 9/14/17, in pharmacy flu shot today  Tension HA continue flexeril and naproxen  allergy flonase continue  sinus polyp per dentist, obtain rec.  ref to ENT.patient refuses, azelastine and Claritin D 5 mg in the morning  Nipple retraction mamm and US:negative pathology  tob to quit,   Cysts in liver and kidney, watch  Kidney lesion renal image CT renal protocol cyst and  stone non obstructing  A.fib and pericardia eff, follow cardio patient to schedule  Left adrenal tu benign per radilogist   Liver cyst on CT watch  Right neck SK watch for now, patient to call if increase in size or change in color  Colon 7/24/19, repeat in 3 years  1 mo after labs

## 2019-12-09 RX ORDER — DULOXETIN HYDROCHLORIDE 60 MG/1
CAPSULE, DELAYED RELEASE ORAL
Qty: 180 CAPSULE | Refills: 0 | Status: SHIPPED | OUTPATIENT
Start: 2019-12-09 | End: 2020-03-05 | Stop reason: SDUPTHER

## 2019-12-11 RX ORDER — TRAZODONE HYDROCHLORIDE 100 MG/1
100 TABLET ORAL EVERY EVENING
Qty: 60 TABLET | Refills: 0 | Status: SHIPPED | OUTPATIENT
Start: 2019-12-11 | End: 2020-02-24

## 2020-01-07 ENCOUNTER — CONSULT (OUTPATIENT)
Dept: CARDIOLOGY | Facility: CLINIC | Age: 75
End: 2020-01-07

## 2020-01-07 VITALS
DIASTOLIC BLOOD PRESSURE: 64 MMHG | HEART RATE: 88 BPM | WEIGHT: 119 LBS | HEIGHT: 65 IN | BODY MASS INDEX: 19.83 KG/M2 | OXYGEN SATURATION: 96 % | SYSTOLIC BLOOD PRESSURE: 84 MMHG

## 2020-01-07 DIAGNOSIS — G90.9 AUTONOMIC DYSFUNCTION: ICD-10-CM

## 2020-01-07 DIAGNOSIS — I10 ESSENTIAL HYPERTENSION: ICD-10-CM

## 2020-01-07 DIAGNOSIS — I48.0 PAROXYSMAL ATRIAL FIBRILLATION (HCC): Primary | ICD-10-CM

## 2020-01-07 DIAGNOSIS — I25.10 CORONARY ARTERY DISEASE INVOLVING NATIVE CORONARY ARTERY OF NATIVE HEART WITHOUT ANGINA PECTORIS: ICD-10-CM

## 2020-01-07 DIAGNOSIS — Z72.0 TOBACCO USE: ICD-10-CM

## 2020-01-07 PROCEDURE — 93000 ELECTROCARDIOGRAM COMPLETE: CPT | Performed by: INTERNAL MEDICINE

## 2020-01-07 PROCEDURE — 99204 OFFICE O/P NEW MOD 45 MIN: CPT | Performed by: INTERNAL MEDICINE

## 2020-01-07 NOTE — PROGRESS NOTES
"     [facility] Cardiology OP Consult Note    Gabi Dudley  [id number]  2020    Referred By: Paul Quinteros MD    Chief Complaint: Paroxysmal atrial fibrillation    History of Present Illness:   Mrs. Gabi Dudley is a 74 y.o. female who presents to the Cardiology Clinic for evaluation of atrial fibrillation.  The patient has a past medical history significant for hypertension, dyslipidemia, prior pulmonary embolism, fibromyalgia, anxiety, and chronic tobacco use complicated by COPD.  Her past cardiac history is significant for atrial fibrillation, with an ECG in  demonstrating atrial fibrillation with a ventricular rate of 118 bpm.  It does not appear she was started on anticoagulation at that time.  Since her initial diagnosis, it appears she has continued to have episodes of paroxysmal atrial fibrillation.  Her cardiac history also reportedly includes nonobstructive coronary artery disease based on remote angiography.  In terms of the patient's atrial fibrillation, it is currently unclear if she is having symptomatic episodes of PAF.  The patient does report intermittent episodes of a \"pounding\" heart rate.  The episodes tend to be brief and are not associated with dizziness, lightheadedness, or syncope.  The patient does, however, report frequent orthostatic symptoms as well as prior syncopal episodes when rising from a lying to standing position quickly.  On review of prior records, this has limited the ability to start beta-blocker therapy in the past.  At this time, the patient denies chest pain or exertional chest discomfort.  She reports chronic exertional dyspnea, which is related to her underlying COPD and ongoing tobacco use.  She also reports general fatigue, which also appears to be chronic and significantly progressive over the past year.  Denies orthopnea, PND, or significant lower extremity edema.  No other specific complaints at this time.    Past Cardiac Testin. " Last Coronary Angio: Reportedly remote, report unavailable  2. Prior Stress Testing: Remote, with report unavailable  3. Last Echo: None  4. Prior Holter Monitor: None    Review of Systems:   Review of Systems   Constitutional: Positive for fatigue. Negative for activity change, appetite change, chills, diaphoresis, fever, unexpected weight gain and unexpected weight loss.   Respiratory: Negative for cough, chest tightness, shortness of breath and wheezing.    Cardiovascular: Positive for palpitations. Negative for chest pain and leg swelling.   Gastrointestinal: Negative for abdominal pain, anal bleeding, blood in stool and GERD.   Endocrine: Negative for cold intolerance and heat intolerance.   Genitourinary: Negative for hematuria.   Neurological: Positive for dizziness, syncope and light-headedness. Negative for weakness.   Hematological: Does not bruise/bleed easily.   Psychiatric/Behavioral: Negative for depressed mood and stress. The patient is not nervous/anxious.        Past Medical History:   Past Medical History:   Diagnosis Date   • Adrenal adenoma    • Anemia    • Anxiety    • Arrhythmia    • Asthma    • Back pain    • Benign colonic polyp    • Chronic bronchitis (CMS/HCC)    • Chronic bronchitis with COPD (chronic obstructive pulmonary disease) (CMS/HCC)    • COPD (chronic obstructive pulmonary disease) (CMS/HCC) 2005   • Depression    • Environmental and seasonal allergies    • Fibromyalgia    • Fibromyalgia    • Gastritis    • Generalized anxiety disorder    • GERD (gastroesophageal reflux disease)    • H/O mammogram    • Hemorrhoids    • History of blood transfusion 1985   • History of blood transfusion 1985   • History of echocardiogram    • History of endometriosis    • History of nuclear stress test    • History of osteoarthritis    • Hypertension    • IBS (irritable bowel syndrome)    • Inverted nipple    • Kidney disease    • Kidney stone    • Liver cyst    • Nodular radiologic density    •  Nodule of left lung    • On home oxygen therapy     2 liters NC QHS   • Osteoarthritis    • Osteoporosis    • PONV (postoperative nausea and vomiting)    • Renal cyst    • Sinus problem     2014   • Sinusitis    • Skin cancer     basal cell carcinoma   • SOB (shortness of breath)    • Tobacco use    • Vitamin D deficiency        Past Surgical History:   Past Surgical History:   Procedure Laterality Date   • APPENDECTOMY  1980s   • CARDIAC CATHETERIZATION  1975   • CATARACT EXTRACTION  2013    both eyes   • COLONOSCOPY  03/11/2013   • COLONOSCOPY  06/21/2016   • COLONOSCOPY N/A 7/24/2019    Procedure: COLONOSCOPY W/ COLD FORCEP POLYPECTOMIES; HOT SNARE POLYPECTOMIES; COLD SNARE POLYPECTOMY;  Surgeon: Goyo Nunez MD;  Location: [facility] ENDOSCOPY;  Service: Gastroenterology   • COLONOSCOPY W/ BIOPSIES AND POLYPECTOMY     • HYSTERECTOMY  1980s    partial   • TONSILLECTOMY  1987   • UPPER GASTROINTESTINAL ENDOSCOPY  01/13/2015   • VAGINAL DELIVERY      x2       Family History:   Family History   Problem Relation Age of Onset   • Stomach cancer Brother    • Colon cancer Maternal Aunt    • Brain cancer Father    • Arthritis Father    • Hypertension Father    • Lung cancer Paternal Grandfather    • Breast cancer Neg Hx    • Ovarian cancer Neg Hx        Social History:   Social History     Socioeconomic History   • Marital status:      Spouse name: Not on file   • Number of children: Not on file   • Years of education: Not on file   • Highest education level: Not on file   Tobacco Use   • Smoking status: Current Every Day Smoker     Packs/day: 0.50     Years: 30.00     Pack years: 15.00     Types: Cigarettes   • Smokeless tobacco: Never Used   • Tobacco comment: Uses Nicotine patches occasionally.    Substance and Sexual Activity   • Alcohol use: No   • Drug use: No   • Sexual activity: Defer       Medications:     Current Outpatient Medications:   •  albuterol (ACCUNEB) 1.25 MG/3ML nebulizer solution, 1 ampule  Q6H. J44.1- J96.21, Disp: 30 vial, Rfl: 3  •  albuterol (PROVENTIL HFA;VENTOLIN HFA) 108 (90 Base) MCG/ACT inhaler, Inhale 2 puffs Every 4 (Four) Hours As Needed for Wheezing., Disp: 1 inhaler, Rfl: 5  •  atorvastatin (LIPITOR) 20 MG tablet, Take 1 tablet by mouth Daily., Disp: 90 tablet, Rfl: 3  •  Azelastine HCl 137 MCG/SPRAY solution, 1 spray into the nostril(s) as directed by provider 2 (Two) Times a Day., Disp: 30 mL, Rfl: 1  •  budesonide-formoterol (SYMBICORT) 160-4.5 MCG/ACT inhaler, Inhale 2 puffs 2 (Two) Times a Day., Disp: 1 inhaler, Rfl: 12  •  clonazePAM (KlonoPIN) 1 MG tablet, 1/2 tab bid po prn, Disp: 30 tablet, Rfl: 1  •  cyanocobalamin 1000 MCG/ML injection, Inject 1 mL into the appropriate muscle as directed by prescriber Every 28 (Twenty-Eight) Days for 280 days., Disp: 10 mL, Rfl: 0  •  DULoxetine (CYMBALTA) 60 MG capsule, take 1 capsule by mouth twice a day, Disp: 180 capsule, Rfl: 0  •  HYDROcodone-acetaminophen (NORCO)  MG per tablet, take 1 tablet by mouth three times a day, Disp: , Rfl: 0  •  loratadine (CLARITIN) 10 MG tablet, Take  by mouth., Disp: , Rfl:   •  pantoprazole (PROTONIX) 40 MG EC tablet, take 1 tablet by mouth every morning 30 MINUTES BEFORE BREAKFAST, Disp: 30 tablet, Rfl: 5  •  sertraline (ZOLOFT) 100 MG tablet, 1 and 1/2 tab daily po, Disp: 135 tablet, Rfl: 1  •  traZODone (DESYREL) 100 MG tablet, take 1 tablet by mouth every evening, Disp: 60 tablet, Rfl: 0  •  VENTOLIN  (90 Base) MCG/ACT inhaler, inhale 2 puffs by mouth every 4 hours if needed for wheezing, Disp: 18 g, Rfl: 5  •  apixaban (ELIQUIS) 5 MG tablet tablet, Take 1 tablet by mouth Every 12 (Twelve) Hours., Disp: 60 tablet, Rfl: 6    Allergies:   No Known Allergies    Physical Exam:  Vital Signs:   Vitals:    01/07/20 1358 01/07/20 1408 01/07/20 1409   BP: 110/70 110/64 (!) 84/64   BP Location: Right arm Left arm Left arm   Patient Position: Sitting Sitting Standing   Cuff Size: Adult Adult Adult  "  Pulse: 88     SpO2: 96%     Weight: 54 kg (119 lb)     Height: 165.1 cm (65\")         Physical Exam   Constitutional: She is oriented to person, place, and time. She appears well-developed and well-nourished. No distress.   HENT:   Head: Normocephalic and atraumatic.   Moist Mucous Membranes.    Eyes: Pupils are equal, round, and reactive to light. EOM are normal. No scleral icterus.   Neck: No tracheal deviation present.   Cardiovascular: Normal rate, regular rhythm, normal heart sounds and intact distal pulses. Exam reveals no gallop and no friction rub.   No murmur heard.  Normal JVD.  No peripheral edema   Pulmonary/Chest: Effort normal. No stridor. No respiratory distress. She has no rales. She exhibits no tenderness.   Scattered expiratory wheezes with prolonged expiratory phase.   Abdominal: Soft. Bowel sounds are normal. She exhibits no distension. There is no tenderness. There is no rebound and no guarding.   Musculoskeletal: Normal range of motion. She exhibits no edema.   Lymphadenopathy:     She has no cervical adenopathy.   Neurological: She is alert and oriented to person, place, and time.   Skin: Skin is warm and dry. She is not diaphoretic. No erythema.   Psychiatric: She has a normal mood and affect. Her behavior is normal.       Results Review:   I reviewed the patient's new clinical results.  I personally viewed and interpreted the patient's EKG/Telemetry data  Labs reviewed from 11/18/2019  1.  TSH: 2.03  2.  Lipid profile: Total cholesterol 178, triglycerides 55, HDL 74, LDL 93  3.  CMP: Creatinine 0.76, GFR 75, sodium 142, potassium 4.4  4.  CBC: Hemoglobin 14.4, platelets 256      ECG 12 Lead  Date/Time: 1/7/2020 4:43 PM  Performed by: Taiwo Curry MD  Authorized by: Taiwo Curry MD   Comparison: not compared with previous ECG   Rhythm: sinus rhythm  Rate: normal  Conduction: conduction normal  QRS axis: normal    Clinical impression: normal ECG            Assessment / Plan: "     1.  Paroxysmal atrial fibrillation   --Known history of paroxysmal atrial fibrillation  --ECG today shows sinus rhythm  --Currently denies palpitations, however unclear if symptomatic as the patient does complain of fatigue  --Given elevated CHADS2-VASc score of 4 discussed the risks and benefits of anticoagulation, and will start Eliquis for CVA prophylaxis  --Given symptomatic orthostatic hypotension, the patient is unlikely to be able to tolerate rate control medications other than digoxin  --If the patient continues to experience fatigue at the time of her next follow-up, will then consider utpatient cardiac monitoring to assess burden of atrial fibrillation and rate control  --Follow-up in 3 months, or sooner if required    2.  Coronary artery disease   --Reported history of nonobstructive coronary artery disease based on remote angiography  --Currently without chest pain or anginal symptoms  --ECG shows sinus rhythm without acute or prior ischemic changes  --If persistent or progressive fatigue, may then consider echocardiogram and MPS to further rule out obstructive CAD as contributing to her fatigue  --Continue atorvastatin  --Will hold aspirin for now, given patient's concerns regarding bleeding in the setting of starting therapeutic anticoagulation.  Reconsider starting aspirin 81 mg if the patient tolerates Eliquis without significant bleeding or bruising    3.  Essential hypertension  --History of hypertension, however currently normotensive    4.  Autonomic dysfunction  --The patient presents with orthostatic symptoms and orthostatic vitals today, likely due to underlying autonomic dysfunction  --Discussed conservative measures regarding hydration, slow changes in position, etc...  --If persistent orthostatic symptoms, may then consider initiation of Midrin or fluticasone  --Reevaluate symptoms at the time of next follow-up    5.  Chronic tobacco use  --Complicated by COPD, which may be contributing  to fatigue and mild exertional dyspnea  --Strongly advised cessation      Preventative Cardiology:   Tobacco Cessation: Cessation Counseling Provided   Obstructive Sleep Apnea Screening: Deffered  AAA Screening: N/A  Peripheral Arterial Disease Screening: N/A    Follow Up:   Return in about 3 months (around 4/7/2020).      Thank you for allowing me to participate in the care of your patient. Please to not hesitate to contact me with additional questions or concerns.     VIDAL Curry MD  Interventional Cardiology   01/07/2020  2:17 PM

## 2020-01-23 RX ORDER — PANTOPRAZOLE SODIUM 40 MG/1
TABLET, DELAYED RELEASE ORAL
Qty: 90 TABLET | Refills: 0 | Status: SHIPPED | OUTPATIENT
Start: 2020-01-23 | End: 2020-03-05 | Stop reason: SDUPTHER

## 2020-01-31 ENCOUNTER — TELEPHONE (OUTPATIENT)
Dept: INTERNAL MEDICINE | Facility: CLINIC | Age: 75
End: 2020-01-31

## 2020-01-31 NOTE — TELEPHONE ENCOUNTER
Patient's daughter called in to request a refill of albuterol (ACCUNEB) 1.25 MG/3ML nebulizer solution. Patient's daughter stated she was informed by the pharmacy that they need a diagnosis code in order to process the refill. Please call and advise.    [facility] on [facility] in [place] confirmed      Patient's daughter call back [phone]

## 2020-02-03 DIAGNOSIS — J20.9 ACUTE BRONCHITIS, UNSPECIFIED ORGANISM: ICD-10-CM

## 2020-02-03 RX ORDER — ALBUTEROL SULFATE 1.25 MG/3ML
SOLUTION RESPIRATORY (INHALATION)
Qty: 30 VIAL | Refills: 3 | Status: SHIPPED | OUTPATIENT
Start: 2020-02-03 | End: 2020-09-08

## 2020-02-24 RX ORDER — TRAZODONE HYDROCHLORIDE 100 MG/1
100 TABLET ORAL EVERY EVENING
Qty: 60 TABLET | Refills: 5 | Status: SHIPPED | OUTPATIENT
Start: 2020-02-24 | End: 2020-03-05 | Stop reason: SDUPTHER

## 2020-02-26 ENCOUNTER — TELEPHONE (OUTPATIENT)
Dept: CARDIOLOGY | Facility: CLINIC | Age: 75
End: 2020-02-26

## 2020-03-05 ENCOUNTER — OFFICE VISIT (OUTPATIENT)
Dept: INTERNAL MEDICINE | Facility: CLINIC | Age: 75
End: 2020-03-05

## 2020-03-05 VITALS
SYSTOLIC BLOOD PRESSURE: 122 MMHG | RESPIRATION RATE: 16 BRPM | HEART RATE: 89 BPM | BODY MASS INDEX: 20.49 KG/M2 | WEIGHT: 123 LBS | DIASTOLIC BLOOD PRESSURE: 64 MMHG | OXYGEN SATURATION: 93 % | HEIGHT: 65 IN | TEMPERATURE: 97.3 F

## 2020-03-05 DIAGNOSIS — I48.0 PAROXYSMAL ATRIAL FIBRILLATION (HCC): Primary | ICD-10-CM

## 2020-03-05 DIAGNOSIS — M17.9 OSTEOARTHRITIS OF KNEE, UNSPECIFIED LATERALITY, UNSPECIFIED OSTEOARTHRITIS TYPE: ICD-10-CM

## 2020-03-05 DIAGNOSIS — E53.8 B12 DEFICIENCY: ICD-10-CM

## 2020-03-05 DIAGNOSIS — R12 HEARTBURN: Chronic | ICD-10-CM

## 2020-03-05 DIAGNOSIS — I10 ESSENTIAL HYPERTENSION: ICD-10-CM

## 2020-03-05 DIAGNOSIS — F41.9 ANXIETY: ICD-10-CM

## 2020-03-05 DIAGNOSIS — J43.9 PULMONARY EMPHYSEMA, UNSPECIFIED EMPHYSEMA TYPE (HCC): ICD-10-CM

## 2020-03-05 DIAGNOSIS — E55.9 VITAMIN D DEFICIENCY: ICD-10-CM

## 2020-03-05 DIAGNOSIS — Z72.0 TOBACCO USE: ICD-10-CM

## 2020-03-05 DIAGNOSIS — M79.7 FIBROMYALGIA: ICD-10-CM

## 2020-03-05 DIAGNOSIS — I25.10 CORONARY ARTERY DISEASE INVOLVING NATIVE CORONARY ARTERY OF NATIVE HEART WITHOUT ANGINA PECTORIS: ICD-10-CM

## 2020-03-05 DIAGNOSIS — G47.00 INSOMNIA, UNSPECIFIED TYPE: ICD-10-CM

## 2020-03-05 DIAGNOSIS — M81.0 OSTEOPOROSIS, UNSPECIFIED OSTEOPOROSIS TYPE, UNSPECIFIED PATHOLOGICAL FRACTURE PRESENCE: ICD-10-CM

## 2020-03-05 DIAGNOSIS — G44.209 TENSION HEADACHE: ICD-10-CM

## 2020-03-05 PROCEDURE — 99214 OFFICE O/P EST MOD 30 MIN: CPT | Performed by: INTERNAL MEDICINE

## 2020-03-05 RX ORDER — SERTRALINE HYDROCHLORIDE 100 MG/1
TABLET, FILM COATED ORAL
Qty: 135 TABLET | Refills: 1 | Status: SHIPPED | OUTPATIENT
Start: 2020-03-05 | End: 2020-11-23

## 2020-03-05 RX ORDER — TRAZODONE HYDROCHLORIDE 100 MG/1
100 TABLET ORAL EVERY EVENING
Qty: 60 TABLET | Refills: 5 | Status: SHIPPED | OUTPATIENT
Start: 2020-03-05 | End: 2021-03-05

## 2020-03-05 RX ORDER — ATORVASTATIN CALCIUM 20 MG/1
20 TABLET, FILM COATED ORAL DAILY
Qty: 90 TABLET | Refills: 3 | Status: SHIPPED | OUTPATIENT
Start: 2020-03-05 | End: 2020-08-04

## 2020-03-05 RX ORDER — DULOXETIN HYDROCHLORIDE 60 MG/1
60 CAPSULE, DELAYED RELEASE ORAL 2 TIMES DAILY
Qty: 180 CAPSULE | Refills: 0 | Status: SHIPPED | OUTPATIENT
Start: 2020-03-05 | End: 2020-08-04

## 2020-03-05 RX ORDER — PANTOPRAZOLE SODIUM 40 MG/1
40 TABLET, DELAYED RELEASE ORAL DAILY
Qty: 90 TABLET | Refills: 0 | Status: SHIPPED | OUTPATIENT
Start: 2020-03-05 | End: 2020-08-04

## 2020-03-05 NOTE — PROGRESS NOTES
Subjective   Gabi Dudley is a 75 y.o. female.     Chief Complaint   Patient presents with   • Hyperlipidemia   • Hypertension       History of Present Illness   Patient here for follow-up.  Hypertension stable on medication.  Hyperlipidemia stable on medication.  Atrial fibrillation on Eliquis is stable.  CAD stable.  Anxiety depression stable on medication.  Fibromyalgia patient is still taking Lortab from pain clinic.  The vitamin D is low now.    Current Outpatient Medications:   •  albuterol (ACCUNEB) 1.25 MG/3ML nebulizer solution, 1 ampule Q6H. J43.9, Disp: 30 vial, Rfl: 3  •  albuterol (PROVENTIL HFA;VENTOLIN HFA) 108 (90 Base) MCG/ACT inhaler, Inhale 2 puffs Every 4 (Four) Hours As Needed for Wheezing., Disp: 1 inhaler, Rfl: 5  •  apixaban (ELIQUIS) 5 MG tablet tablet, Take 1 tablet by mouth Every 12 (Twelve) Hours., Disp: 60 tablet, Rfl: 6  •  atorvastatin (LIPITOR) 20 MG tablet, Take 1 tablet by mouth Daily., Disp: 90 tablet, Rfl: 3  •  Azelastine HCl 137 MCG/SPRAY solution, 1 spray into the nostril(s) as directed by provider 2 (Two) Times a Day., Disp: 30 mL, Rfl: 1  •  budesonide-formoterol (SYMBICORT) 160-4.5 MCG/ACT inhaler, Inhale 2 puffs 2 (Two) Times a Day., Disp: 1 inhaler, Rfl: 12  •  clonazePAM (KlonoPIN) 1 MG tablet, 1/2 tab bid po prn, Disp: 30 tablet, Rfl: 1  •  cyanocobalamin 1000 MCG/ML injection, Inject 1 mL into the appropriate muscle as directed by prescriber Every 28 (Twenty-Eight) Days for 280 days., Disp: 10 mL, Rfl: 0  •  DULoxetine (CYMBALTA) 60 MG capsule, Take 1 capsule by mouth 2 (Two) Times a Day., Disp: 180 capsule, Rfl: 0  •  HYDROcodone-acetaminophen (NORCO)  MG per tablet, take 1 tablet by mouth three times a day, Disp: , Rfl: 0  •  pantoprazole (PROTONIX) 40 MG EC tablet, Take 1 tablet by mouth Daily., Disp: 90 tablet, Rfl: 0  •  sertraline (ZOLOFT) 100 MG tablet, 1 and 1/2 tab daily po, Disp: 135 tablet, Rfl: 1  •  traZODone (DESYREL) 100 MG tablet, Take 1 tablet  by mouth Every Evening., Disp: 60 tablet, Rfl: 5  •  VENTOLIN  (90 Base) MCG/ACT inhaler, inhale 2 puffs by mouth every 4 hours if needed for wheezing, Disp: 18 g, Rfl: 5    The following portions of the patient's history were reviewed and updated as appropriate: allergies, current medications, past family history, past medical history, past social history, past surgical history and problem list.    Review of Systems   Constitutional: Negative.    Respiratory: Negative.    Cardiovascular: Negative.    Gastrointestinal: Negative.    Musculoskeletal: Negative.    Skin: Negative.    Neurological: Negative.    Psychiatric/Behavioral: Negative.        Objective   Physical Exam   Constitutional: She is oriented to person, place, and time. She appears well-nourished.   Neck: Neck supple.   Cardiovascular: Normal rate, regular rhythm and normal heart sounds.   Pulmonary/Chest: Effort normal and breath sounds normal.   Abdominal: Bowel sounds are normal.   Neurological: She is alert and oriented to person, place, and time.   Skin: Skin is warm.   Psychiatric: She has a normal mood and affect.       All tests have been reviewed.    Assessment/Plan   Diagnoses and all orders for this visit:    Paroxysmal atrial fibrillation (CMS/HCC) continue Eliquis    Coronary artery disease involving native coronary artery of native heart without angina pectoris continue medication    Pulmonary emphysema, unspecified emphysema type (CMS/HCC)    Fibromyalgia continue Lortab up from pain clinic    Anxiety continue clonazepam encourage patient take only as needed    Insomnia, unspecified type    Tobacco use encouraged to quit    Osteoarthritis of knee, unspecified laterality, unspecified osteoarthritis type    Essential hypertension continue medication    Heartburn    Vitamin D deficiency    Tension headache    Osteoporosis, unspecified osteoporosis type, unspecified pathological fracture presence    Other orders  -     DULoxetine  (CYMBALTA) 60 MG capsule; Take 1 capsule by mouth 2 (Two) Times a Day.  -     pantoprazole (PROTONIX) 40 MG EC tablet; Take 1 tablet by mouth Daily.  -     sertraline (ZOLOFT) 100 MG tablet; 1 and 1/2 tab daily po  -     traZODone (DESYREL) 100 MG tablet; Take 1 tablet by mouth Every Evening.  -     atorvastatin (LIPITOR) 20 MG tablet; Take 1 tablet by mouth Daily.    2 months forWellness check after blood tests           historical data below   tob, CT lung 2014, lung nodule, scarring and emphysema. Stable low dose CT repeat 4/29/19  hyperglycemia, continue diet  vitD low continue vitD3 1000u daily  anxiety continue zoloft 100mg daily increased to 150 mg daily.   and klonopin 1mg, 1/2 tab po prn , again emphasized as needed basis.  Continue cymbalta 60 bid.  Patient refused to take BuSpar or see psychiatrist.  CT scan showed liver cyst, large kidney cyst, adrenal gland adenoma, kidney stones non-obstructing. repeat showed no change 1/2018  right knee OA on xray aleve prn knee brace help  hip pain s/p steroid helped improved  Hyperlipidemia stable on medicine continue need a blood tests  Hypertension normal without med,   Osteoporosis continue medication, refuse dexa   heme+, Colon polyps repeat 6/2019. EGD done  gastritis continue protonix  COPD continue medication. need to Quit tobacco. tudorsa, proair, continue O2 prn, symbicort refill nebulizer  Insomnia continue trazodone 100mg   Fibromyalgia stable watch continue medicine and follow up with pain clinic,with lortab  VacUTD, pneumovax again 9/14/17, in pharmacy flu shot today  Tension HA continue flexeril and naproxen  allergy flonase continue  sinus polyp per dentist, obtain rec.  ref to ENT.patient refuses, azelastine and Claritin D 5 mg in the morning  Nipple retraction mamm and US:negative pathology  tob to quit,   Cysts in liver and kidney, watch  Kidney lesion renal image CT renal protocol cyst and stone non obstructing  A.fib and pericardia eff, follow  cardio patient to schedule  Left adrenal tu benign per radilogist   Liver cyst on CT watch  Right neck SK watch for now, patient to call if increase in size or change in color  Colon 7/24/19, repeat in 3 years

## 2020-03-06 NOTE — TELEPHONE ENCOUNTER
Please have the patient schedule an appointment with me to discuss switching anticoagulants.     Thanks

## 2020-03-10 NOTE — TELEPHONE ENCOUNTER
Attempted to contact patient again. Could not leave vm.  LCM at [facility] number for patient it call back.

## 2020-03-24 ENCOUNTER — TELEPHONE (OUTPATIENT)
Dept: CARDIOLOGY | Facility: CLINIC | Age: 75
End: 2020-03-24

## 2020-03-24 ENCOUNTER — TELEPHONE (OUTPATIENT)
Dept: INTERNAL MEDICINE | Facility: CLINIC | Age: 75
End: 2020-03-24

## 2020-03-24 NOTE — TELEPHONE ENCOUNTER
I have spoken to the patient regarding options for anticoagulation, and is decided to switch from Eliquis to Xarelto due to the risk of coronavirus if she had to come the lab for INR checks.  Please check for samples of Xarelto, and if we have 2-month supply of Xarelto 20 mg p.o. daily, please call the patient and notify her she can come to  the samples.  She should then start the Xarelto 24 hours after her last dose of Eliquis.  Patient additionally be scheduled for a follow-up appointment just prior to her Xarelto samples run out.

## 2020-03-24 NOTE — TELEPHONE ENCOUNTER
Patient has appointment today and daughter states she needs to cancel it due to epidemic.  Daughter wanted to know if you can change patient's eliquis to something else without her coming in for an appointment. Advise.

## 2020-03-24 NOTE — TELEPHONE ENCOUNTER
Called patient daughter and advised her we didn't have any samples of Eliquis, I told her that where we had no drugs reps coming right now we were low on samples.

## 2020-03-24 NOTE — TELEPHONE ENCOUNTER
Patient's daughter Malu would like to know if we have samples of Eliquis available that the patient could have. Malu stated the patient is prescribed Eliquis but it is too expensive.    Please call and advise. Malu's call back [phone]

## 2020-04-03 ENCOUNTER — OFFICE VISIT (OUTPATIENT)
Dept: INTERNAL MEDICINE | Facility: CLINIC | Age: 75
End: 2020-04-03

## 2020-04-03 ENCOUNTER — TELEPHONE (OUTPATIENT)
Dept: INTERNAL MEDICINE | Facility: CLINIC | Age: 75
End: 2020-04-03

## 2020-04-03 DIAGNOSIS — R10.33 PERIUMBILICAL ABDOMINAL PAIN: Primary | ICD-10-CM

## 2020-04-03 PROCEDURE — G2025 DIS SITE TELE SVCS RHC/FQHC: HCPCS | Performed by: INTERNAL MEDICINE

## 2020-04-03 NOTE — TELEPHONE ENCOUNTER
Patient called and requested a prescription to be called in, in regards to a stomach bug. Patient doesn't believe its COVID 19 related but she has been vomiting.  Please return call and advise.

## 2020-04-03 NOTE — PROGRESS NOTES
You have chosen to receive care through a telephone visit today. Do you consent to use a telephone visit for your medical care today? Yes    Telephone visit discussed with   Bony Fong Yin MD Joann Layne    Patient complains 3-day ago after eating some nuts patient developed abdominal pain umbilical area nausea vomiting pain radiated to the back on Wednesday pain is much improved already still has some nausea no vomiting denies any diarrhea constipation except loose stool.  Patient does have decreased appetite denies any fever chills no upper respiratory symptoms no UTI symptoms patient complains pain is about 4 out of 10 when resting self tenderness.  Phenergan helps with nausea.    Review of systems  Abdominal pain nausea loss of appetite loose stool no fever chills no upper respiratory symptoms    Assessment and plan     Mid abdominal pain possible due to indigestion or viral GE.  Encourage patient to do Phenergan, Tylenol, clear liquids    Abdominal pain 4 out of 10 had radiation to the back 2 days ago suspecting possible gallbladder versus a pancreatic pathology.  Patient declined to have any lab tests or CT scan done at this point patient does have some tenderness in umbilical area.  Agree to have Phenergan clear liquids for now if condition gets worse we may have to do tests.  Patient will let us know.    This visit has been rescheduled as a phone visit to comply with patient safety concerns in accordance with CDC recommendations. Total time of discussion was 14 minutes.

## 2020-04-27 ENCOUNTER — TELEPHONE (OUTPATIENT)
Dept: INTERNAL MEDICINE | Facility: CLINIC | Age: 75
End: 2020-04-27

## 2020-04-27 NOTE — TELEPHONE ENCOUNTER
Patient's daughter , Malu, called requesting that Dr. Quinteros order for the patient a backpack portable oxygen tank from [facility]. Patient was told by [facility] that she can't get one without an order.    Please call once this has been complete.

## 2020-05-18 DIAGNOSIS — I48.0 PAROXYSMAL ATRIAL FIBRILLATION (HCC): ICD-10-CM

## 2020-05-18 DIAGNOSIS — I25.10 CORONARY ARTERY DISEASE INVOLVING NATIVE CORONARY ARTERY OF NATIVE HEART WITHOUT ANGINA PECTORIS: Primary | ICD-10-CM

## 2020-05-22 RX ORDER — CLONAZEPAM 1 MG/1
TABLET ORAL
Qty: 90 TABLET | Refills: 0 | Status: SHIPPED | OUTPATIENT
Start: 2020-05-22 | End: 2020-09-28

## 2020-05-26 ENCOUNTER — OFFICE VISIT (OUTPATIENT)
Dept: CARDIOLOGY | Facility: CLINIC | Age: 75
End: 2020-05-26

## 2020-05-26 VITALS
BODY MASS INDEX: 20.16 KG/M2 | DIASTOLIC BLOOD PRESSURE: 74 MMHG | HEIGHT: 65 IN | WEIGHT: 121 LBS | HEART RATE: 99 BPM | SYSTOLIC BLOOD PRESSURE: 120 MMHG | OXYGEN SATURATION: 98 %

## 2020-05-26 DIAGNOSIS — R53.82 CHRONIC FATIGUE: ICD-10-CM

## 2020-05-26 DIAGNOSIS — Z72.0 TOBACCO USE: ICD-10-CM

## 2020-05-26 DIAGNOSIS — I10 ESSENTIAL HYPERTENSION: ICD-10-CM

## 2020-05-26 DIAGNOSIS — I25.10 CORONARY ARTERY DISEASE INVOLVING NATIVE CORONARY ARTERY OF NATIVE HEART WITHOUT ANGINA PECTORIS: ICD-10-CM

## 2020-05-26 DIAGNOSIS — I48.0 PAROXYSMAL ATRIAL FIBRILLATION (HCC): Primary | ICD-10-CM

## 2020-05-26 DIAGNOSIS — M79.7 FIBROMYALGIA: ICD-10-CM

## 2020-05-26 PROCEDURE — 99214 OFFICE O/P EST MOD 30 MIN: CPT | Performed by: INTERNAL MEDICINE

## 2020-05-26 NOTE — PROGRESS NOTES
"             [facility] Cardiology Office Follow Up Note    Gabi Dudley  [id number]  2020    Primary Care Provider: Paul Quinteros MD    Chief Complaint: Regular follow-up    History of Present Illness:   Mrs. Gabi Dudley is a 75 y.o. female who presents to the Cardiology Clinic for follow-up of paroxysmal atrial fibrillation.  The patient has a past medical history significant for hypertension, dyslipidemia, prior pulmonary embolism, fibromyalgia, anxiety, and chronic tobacco use complicated by COPD.  Her past cardiac history is significant for atrial fibrillation, with an ECG in  demonstrating atrial fibrillation with a ventricular rate of 118 bpm.   The patient also has a history of nonobstructive coronary artery disease, reportedly diagnosed on remote coronary angiography.  She presents the cardiology clinic today for regular follow-up.  Since her last appointment, the patient notes persistent fatigue.  She reports that her fatigue is intermittent, with \"some days being worse than others.\"  She does report that her fatigue tends to limit her daily activities.  She denies any recurrent episodes of palpitations or symptoms to suggest recurrent symptomatic atrial fibrillation.  She notes improvement in her orthostatic symptoms since her last appointment, with intermittent dizziness and lightheadedness when standing too quickly.  She denies exertional chest pain or chest discomfort.  No orthopnea, lower extremity edema, or PND.  No presyncopal or syncopal events.  She continues to have chronic exertional dyspnea, in the setting of COPD with ongoing tobacco use.  No other specific complaints at this time.     Past Cardiac Testin. Last Coronary Angio:  , reportedly nonobstructive disease.  Report unavailable  2. Prior Stress Testing: Remote, with report unavailable  3. Last Echo: None  4. Prior Holter Monitor: None    Review of Systems:   Review of Systems   Constitutional: " Positive for fatigue. Negative for activity change, appetite change, chills, diaphoresis, fever, unexpected weight gain and unexpected weight loss.   Eyes: Negative for blurred vision and double vision.   Respiratory: Negative for cough, chest tightness, shortness of breath and wheezing.    Cardiovascular: Negative for chest pain, palpitations and leg swelling.   Gastrointestinal: Negative for abdominal pain, anal bleeding, blood in stool and GERD.   Endocrine: Negative for cold intolerance and heat intolerance.   Genitourinary: Negative for hematuria.   Neurological: Positive for dizziness and light-headedness. Negative for syncope and weakness.   Hematological: Does not bruise/bleed easily.   Psychiatric/Behavioral: Negative for depressed mood and stress. The patient is not nervous/anxious.        I have reviewed and/or updated the patient's past medical, past surgical, family, social history, problem list and allergies as appropriate.     Medications:     Current Outpatient Medications:   •  albuterol (ACCUNEB) 1.25 MG/3ML nebulizer solution, 1 ampule Q6H. J43.9, Disp: 30 vial, Rfl: 3  •  albuterol (PROVENTIL HFA;VENTOLIN HFA) 108 (90 Base) MCG/ACT inhaler, Inhale 2 puffs Every 4 (Four) Hours As Needed for Wheezing., Disp: 1 inhaler, Rfl: 5  •  atorvastatin (LIPITOR) 20 MG tablet, Take 1 tablet by mouth Daily., Disp: 90 tablet, Rfl: 3  •  Azelastine HCl 137 MCG/SPRAY solution, 1 spray into the nostril(s) as directed by provider 2 (Two) Times a Day., Disp: 30 mL, Rfl: 1  •  clonazePAM (KlonoPIN) 1 MG tablet, TAKE 1 TABLET BY MOUTH EVERY EVENING IF NEEDED FOR ANXIETY, Disp: 90 tablet, Rfl: 0  •  cyanocobalamin 1000 MCG/ML injection, Inject 1 mL into the appropriate muscle as directed by prescriber Every 28 (Twenty-Eight) Days for 280 days., Disp: 10 mL, Rfl: 0  •  DULoxetine (CYMBALTA) 60 MG capsule, Take 1 capsule by mouth 2 (Two) Times a Day., Disp: 180 capsule, Rfl: 0  •  HYDROcodone-acetaminophen (NORCO)   "MG per tablet, take 1 tablet by mouth three times a day, Disp: , Rfl: 0  •  pantoprazole (PROTONIX) 40 MG EC tablet, Take 1 tablet by mouth Daily., Disp: 90 tablet, Rfl: 0  •  rivaroxaban (Xarelto) 20 MG tablet, Take 1 tablet by mouth Daily., Disp: 30 tablet, Rfl: 6  •  sertraline (ZOLOFT) 100 MG tablet, 1 and 1/2 tab daily po, Disp: 135 tablet, Rfl: 1  •  traZODone (DESYREL) 100 MG tablet, Take 1 tablet by mouth Every Evening., Disp: 60 tablet, Rfl: 5  •  budesonide-formoterol (SYMBICORT) 160-4.5 MCG/ACT inhaler, Inhale 2 puffs 2 (Two) Times a Day., Disp: 1 inhaler, Rfl: 12  •  VENTOLIN  (90 Base) MCG/ACT inhaler, inhale 2 puffs by mouth every 4 hours if needed for wheezing, Disp: 18 g, Rfl: 5    Physical Exam:  Vital Signs:   Vitals:    05/26/20 1435   BP: 120/74   BP Location: Left arm   Patient Position: Sitting   Cuff Size: Adult   Pulse: 99   SpO2: 98%   Weight: 54.9 kg (121 lb)   Height: 165.1 cm (65\")       Physical Exam   Constitutional: She is oriented to person, place, and time. She appears well-developed and well-nourished.   Sitting in chair in no acute distress.   HENT:   Head: Normocephalic and atraumatic.   Moist Mucous Membranes.    Eyes: Pupils are equal, round, and reactive to light. EOM are normal. No scleral icterus.   Neck: No tracheal deviation present.   Cardiovascular: Normal rate, regular rhythm, normal heart sounds and intact distal pulses. Exam reveals no gallop and no friction rub.   No murmur heard.  Normal JVD.  No significant murmurs.   Pulmonary/Chest: Effort normal. No stridor. No respiratory distress. She has no rales. She exhibits no tenderness.   Few scattered wheezes.  Prolonged expiratory phase.   Abdominal: Soft. Bowel sounds are normal. She exhibits no distension. There is no tenderness. There is no rebound and no guarding.   Musculoskeletal: Normal range of motion. She exhibits no edema.   Lymphadenopathy:     She has no cervical adenopathy.   Neurological: She is " alert and oriented to person, place, and time.   Skin: Skin is warm and dry. She is not diaphoretic. No erythema.   Psychiatric: She has a normal mood and affect. Her behavior is normal.       Results Review:   I reviewed the patient's new clinical results.        Assessment / Plan:     1.  Paroxysmal atrial fibrillation   --Known history of paroxysmal atrial fibrillation  --No palpitations, however fatigue could potentially be related to recurrent PAF and will therefore proceed with outpatient cardiac monitoring to recurrent PAF and assess burden  --Given elevated CHADS2-VASc score of 4 discussed the risks and benefits of anticoagulation, and will start Eliquis for CVA prophylaxis  --Given symptomatic orthostatic hypotension, the patient is unlikely to be able to tolerate rate control medications other than digoxin  --Follow-up in 3 months, or sooner if required     2.  Coronary artery disease   --Reported history of nonobstructive coronary artery disease based on remote angiography  --Denies anginal symptoms  --Given the the potential of fatigue being an anginal equivalent, if echocardiogram and outpatient cardiac monitoring unremarkable will then proceed with MPS  --Continue atorvastatin     3.  Essential hypertension  --Remains normotensive without antihypertensive therapy    4.  Fatigue  --Multiple potential underlying etiologies for fatigue including fibromyalgia, COPD, deconditioning, etc.  --Will obtain echocardiogram to rule out underlying structural or valvular abnormalities  --Outpatient cardiac monitoring to evaluate for recurrent PAF which could be due to underlying etiology for fatigue  --Patient results of echo and cardiac monitoring, will consider MPS to rule out ischemia as contributing to fatigue     5. Autonomic dysfunction  --History of autonomic dysfunction with intermittent symptoms of orthostasis  --Symptoms currently appear to be minimal  --If worsening of symptoms, may then consider  initiation of Miodrin or fluticasone     6. Chronic tobacco use  --Complicated by COPD, which may be contributing to fatigue and mild exertional dyspnea  --Strongly advised cessation        Preventative Cardiology:   Tobacco Cessation: Cessation Counseling Provided   Obstructive Sleep Apnea Screening: Deffered  AAA Screening: N/A  Peripheral Arterial Disease Screening: N/A    Follow Up:   Return in about 3 months (around 8/26/2020).      Thank you for allowing me to participate in the care of your patient. Please to not hesitate to contact me with additional questions or concerns.     VIDAL Curry MD  Interventional Cardiology   05/26/2020  2:51 PM

## 2020-06-16 DIAGNOSIS — I25.10 CORONARY ARTERY DISEASE INVOLVING NATIVE CORONARY ARTERY OF NATIVE HEART WITHOUT ANGINA PECTORIS: ICD-10-CM

## 2020-06-16 NOTE — TELEPHONE ENCOUNTER
Pt called asking for samples of Xarelto. Samples was sent at  for Pts daughter. Pt was also asking about PA for Eliquis.

## 2020-06-25 ENCOUNTER — OFFICE VISIT (OUTPATIENT)
Dept: INTERNAL MEDICINE | Facility: CLINIC | Age: 75
End: 2020-06-25

## 2020-06-25 VITALS
TEMPERATURE: 97.1 F | BODY MASS INDEX: 20.16 KG/M2 | HEIGHT: 65 IN | OXYGEN SATURATION: 94 % | DIASTOLIC BLOOD PRESSURE: 74 MMHG | WEIGHT: 121 LBS | RESPIRATION RATE: 16 BRPM | SYSTOLIC BLOOD PRESSURE: 122 MMHG | HEART RATE: 83 BPM

## 2020-06-25 DIAGNOSIS — K57.90 DIVERTICULOSIS: ICD-10-CM

## 2020-06-25 DIAGNOSIS — R53.82 CHRONIC FATIGUE: ICD-10-CM

## 2020-06-25 DIAGNOSIS — I48.0 PAROXYSMAL ATRIAL FIBRILLATION (HCC): ICD-10-CM

## 2020-06-25 DIAGNOSIS — M17.9 OSTEOARTHRITIS OF KNEE, UNSPECIFIED LATERALITY, UNSPECIFIED OSTEOARTHRITIS TYPE: ICD-10-CM

## 2020-06-25 DIAGNOSIS — I31.39 PERICARDIAL EFFUSION: ICD-10-CM

## 2020-06-25 DIAGNOSIS — N64.59 INVERSION OF NIPPLE: ICD-10-CM

## 2020-06-25 DIAGNOSIS — R12 HEARTBURN: Chronic | ICD-10-CM

## 2020-06-25 DIAGNOSIS — R91.1 LUNG NODULE: ICD-10-CM

## 2020-06-25 DIAGNOSIS — N28.1 SIMPLE RENAL CYST: ICD-10-CM

## 2020-06-25 DIAGNOSIS — G47.00 INSOMNIA, UNSPECIFIED TYPE: ICD-10-CM

## 2020-06-25 DIAGNOSIS — I25.10 CORONARY ARTERY DISEASE INVOLVING NATIVE CORONARY ARTERY OF NATIVE HEART WITHOUT ANGINA PECTORIS: ICD-10-CM

## 2020-06-25 DIAGNOSIS — I10 ESSENTIAL HYPERTENSION: Primary | ICD-10-CM

## 2020-06-25 DIAGNOSIS — M79.7 FIBROMYALGIA: ICD-10-CM

## 2020-06-25 DIAGNOSIS — E55.9 VITAMIN D DEFICIENCY: ICD-10-CM

## 2020-06-25 DIAGNOSIS — G44.209 TENSION HEADACHE: ICD-10-CM

## 2020-06-25 DIAGNOSIS — G90.9 AUTONOMIC DYSFUNCTION: ICD-10-CM

## 2020-06-25 DIAGNOSIS — M81.0 OSTEOPOROSIS, UNSPECIFIED OSTEOPOROSIS TYPE, UNSPECIFIED PATHOLOGICAL FRACTURE PRESENCE: ICD-10-CM

## 2020-06-25 DIAGNOSIS — R10.31 RIGHT LOWER QUADRANT ABDOMINAL PAIN: ICD-10-CM

## 2020-06-25 DIAGNOSIS — Z72.0 TOBACCO USE: ICD-10-CM

## 2020-06-25 DIAGNOSIS — D35.00 ADRENAL ADENOMA, UNSPECIFIED LATERALITY: ICD-10-CM

## 2020-06-25 DIAGNOSIS — F41.9 ANXIETY: ICD-10-CM

## 2020-06-25 DIAGNOSIS — J43.9 PULMONARY EMPHYSEMA, UNSPECIFIED EMPHYSEMA TYPE (HCC): ICD-10-CM

## 2020-06-25 PROBLEM — R10.33 PERIUMBILICAL ABDOMINAL PAIN: Status: RESOLVED | Noted: 2020-04-03 | Resolved: 2020-06-25

## 2020-06-25 PROCEDURE — G0439 PPPS, SUBSEQ VISIT: HCPCS | Performed by: INTERNAL MEDICINE

## 2020-06-25 NOTE — PROGRESS NOTES
The ABCs of the Annual Wellness Visit  Subsequent Medicare Wellness Visit    Chief Complaint   Patient presents with   • Medicare Wellness-subsequent   • Headache     Pt states that she has been having headaches due to having samples of Xarelto from Dr. Curry.        Subjective   History of Present Illness:  Gabi Dudley is a 75 y.o. female who presents for a Subsequent Medicare Wellness Visit.  Patient here for follow-up.  Also Medicare wellness hypertension stable on medication for hyperlipidemia stable on medication.  GERD stable.  The patient complains a headache comes and goes Tylenol helps some, none now.  Also complains of right lower abdominal pain comes and goes tenderness no nausea vomiting diarrhea constipation.  History of a lung nodule needs to be followed up.  Depression anxiety stable on medication.  Atrial fibrillation stable on medication.    HEALTH RISK ASSESSMENT    Recent Hospitalizations:  No hospitalization(s) within the last year.    Current Medical Providers:  Patient Care Team:  Paul Quinteros MD as PCP - General  Paul Quinteros MD as PCP - Family Medicine  Sly Tabor MD as PCP - Claims Attributed    Smoking Status:  Social History     Tobacco Use   Smoking Status Current Every Day Smoker   • Packs/day: 0.50   • Years: 30.00   • Pack years: 15.00   • Types: Cigarettes   Smokeless Tobacco Never Used   Tobacco Comment    Uses Nicotine patches occasionally.        Alcohol Consumption:  Social History     Substance and Sexual Activity   Alcohol Use No       Depression Screen:   PHQ-2/PHQ-9 Depression Screening 4/29/2019   Little interest or pleasure in doing things 0   Feeling down, depressed, or hopeless 1   Trouble falling or staying asleep, or sleeping too much -   Feeling tired or having little energy -   Poor appetite or overeating -   Feeling bad about yourself - or that you are a failure or have let yourself or your family down -   Trouble concentrating on things, such as  reading the newspaper or watching television -   Moving or speaking so slowly that other people could have noticed. Or the opposite - being so fidgety or restless that you have been moving around a lot more than usual -   Thoughts that you would be better off dead, or of hurting yourself in some way -   Total Score 1   If you checked off any problems, how difficult have these problems made it for you to do your work, take care of things at home, or get along with other people? -       Fall Risk Screen:  LAURE Fall Risk Assessment has not been completed.    Health Habits and Functional and Cognitive Screening:  Functional & Cognitive Status 6/25/2020   Do you have difficulty preparing food and eating? No   Do you have difficulty bathing yourself, getting dressed or grooming yourself? No   Do you have difficulty using the toilet? No   Do you have difficulty moving around from place to place? No   Do you have trouble with steps or getting out of a bed or a chair? No   Current Diet Well Balanced Diet   Dental Exam Up to date   Eye Exam Up to date   Exercise (times per week) 3 times per week   Current Exercise Activities Include Walking   Do you need help using the phone?  No   Are you deaf or do you have serious difficulty hearing?  No   Do you need help with transportation? No   Do you need help shopping? No   Do you need help preparing meals?  No   Do you need help with housework?  No   Do you need help with laundry? No   Do you need help taking your medications? No   Do you need help managing money? No   Do you ever drive or ride in a car without wearing a seat belt? No   Have you felt unusual stress, anger or loneliness in the last month? Yes   Who do you live with? Child   If you need help, do you have trouble finding someone available to you? No   Have you been bothered in the last four weeks by sexual problems? No   Do you have difficulty concentrating, remembering or making decisions? No         Does the  patient have evidence of cognitive impairment? No    Asprin use counseling:Does not need ASA (and currently is not on it)    Age-appropriate Screening Schedule:  Refer to the list below for future screening recommendations based on patient's age, sex and/or medical conditions. Orders for these recommended tests are listed in the plan section. The patient has been provided with a written plan.    Health Maintenance   Topic Date Due   • ZOSTER VACCINE (2 of 3) 06/10/2016   • TDAP/TD VACCINES (3 - Td) 07/25/2020   • INFLUENZA VACCINE  08/01/2020   • LIPID PANEL  11/18/2020   • MAMMOGRAM  07/03/2021   • COLONOSCOPY  07/24/2029   • DXA SCAN  Discontinued          The following portions of the patient's history were reviewed and updated as appropriate: allergies, current medications, past family history, past medical history, past social history, past surgical history and problem list.    Outpatient Medications Prior to Visit   Medication Sig Dispense Refill   • albuterol (ACCUNEB) 1.25 MG/3ML nebulizer solution 1 ampule Q6H. J43.9 30 vial 3   • albuterol (PROVENTIL HFA;VENTOLIN HFA) 108 (90 Base) MCG/ACT inhaler Inhale 2 puffs Every 4 (Four) Hours As Needed for Wheezing. 1 inhaler 5   • atorvastatin (LIPITOR) 20 MG tablet Take 1 tablet by mouth Daily. 90 tablet 3   • Azelastine HCl 137 MCG/SPRAY solution 1 spray into the nostril(s) as directed by provider 2 (Two) Times a Day. 30 mL 1   • budesonide-formoterol (SYMBICORT) 160-4.5 MCG/ACT inhaler Inhale 2 puffs 2 (Two) Times a Day. 1 inhaler 12   • clonazePAM (KlonoPIN) 1 MG tablet TAKE 1 TABLET BY MOUTH EVERY EVENING IF NEEDED FOR ANXIETY 90 tablet 0   • cyanocobalamin 1000 MCG/ML injection Inject 1 mL into the appropriate muscle as directed by prescriber Every 28 (Twenty-Eight) Days for 280 days. 10 mL 0   • DULoxetine (CYMBALTA) 60 MG capsule Take 1 capsule by mouth 2 (Two) Times a Day. 180 capsule 0   • HYDROcodone-acetaminophen (NORCO)  MG per tablet take 1  tablet by mouth three times a day  0   • pantoprazole (PROTONIX) 40 MG EC tablet Take 1 tablet by mouth Daily. 90 tablet 0   • rivaroxaban (Xarelto) 20 MG tablet Take 1 tablet by mouth Daily. 30 tablet 6   • sertraline (ZOLOFT) 100 MG tablet 1 and 1/2 tab daily po 135 tablet 1   • traZODone (DESYREL) 100 MG tablet Take 1 tablet by mouth Every Evening. 60 tablet 5   • VENTOLIN  (90 Base) MCG/ACT inhaler inhale 2 puffs by mouth every 4 hours if needed for wheezing 18 g 5     No facility-administered medications prior to visit.        Patient Active Problem List   Diagnosis   • Adrenal adenoma   • Anxiety   • Chronic fatigue   • Fibromyalgia   • Hypertension   • Insomnia   • Osteoarthritis of knee   • Osteoporosis   • Pulmonary emphysema (CMS/HCC)   • Simple renal cyst   • Tension headache   • Vitamin D deficiency   • Diverticulosis   • Inversion of nipple   • Paroxysmal atrial fibrillation (CMS/HCC)   • Pericardial effusion   • Heartburn   • Tobacco use   • Coronary artery disease involving native coronary artery of native heart without angina pectoris   • Autonomic dysfunction       Advanced Care Planning:  ACP discussion was held with the patient during this visit. Patient has an advance directive in EMR which is still valid.     Review of Systems   Constitutional: Negative.    Eyes: Negative.    Respiratory: Negative.    Cardiovascular: Negative.    Gastrointestinal: Positive for abdominal pain.   Endocrine: Negative.    Genitourinary: Negative.    Musculoskeletal: Negative.    Skin: Negative.    Allergic/Immunologic: Negative.    Neurological: Positive for headaches.   Hematological: Negative.    Psychiatric/Behavioral: Negative.        Compared to one year ago, the patient feels her physical health is the same.  Compared to one year ago, the patient feels her mental health is the same.    Reviewed chart for potential of high risk medication in the elderly: yes  Reviewed chart for potential of harmful drug  "interactions in the elderly:no    Objective         Vitals:    06/25/20 1430   BP: 122/74   Pulse: 83   Resp: 16   Temp: 97.1 °F (36.2 °C)   TempSrc: Temporal   SpO2: 94%   Weight: 54.9 kg (121 lb)   Height: 165.1 cm (65\")   PainSc: 0-No pain       Body mass index is 20.14 kg/m².  Discussed the patient's BMI with her. The BMI is in the acceptable range.    Physical Exam   Constitutional: She is oriented to person, place, and time. She appears well-developed and well-nourished.   HENT:   Head: Normocephalic and atraumatic.   Right Ear: External ear normal.   Left Ear: External ear normal.   Nose: Nose normal.   Mouth/Throat: Oropharynx is clear and moist.   Eyes: Pupils are equal, round, and reactive to light. Conjunctivae and EOM are normal.   Neck: Normal range of motion. Neck supple.   Cardiovascular: Normal rate, regular rhythm, normal heart sounds and intact distal pulses.   Pulmonary/Chest: Effort normal and breath sounds normal.   Abdominal: Soft. Bowel sounds are normal. There is tenderness ( right mid and lower abd tender mild).   Musculoskeletal: Normal range of motion.   Neurological: She is alert and oriented to person, place, and time. She has normal reflexes.   Skin: Skin is warm and dry.   Psychiatric: She has a normal mood and affect. Her behavior is normal. Judgment and thought content normal.             Assessment/Plan   Medicare Risks and Personalized Health Plan  CMS Preventative Services Quick Reference  Advance Directive Discussion    The above risks/problems have been discussed with the patient.  Pertinent information has been shared with the patient in the After Visit Summary.  Follow up plans and orders are seen below in the Assessment/Plan Section.    Diagnoses and all orders for this visit:    1. Essential hypertension (Primary)    2. Paroxysmal atrial fibrillation (CMS/HCC)    3. Pericardial effusion    4. Coronary artery disease involving native coronary artery of native heart without " angina pectoris    5. Pulmonary emphysema, unspecified emphysema type (CMS/HCC)    6. Heartburn    7. Vitamin D deficiency    8. Diverticulosis    9. Adrenal adenoma, unspecified laterality  -     CT Abdomen Pelvis With & Without Contrast    10. Fibromyalgia    11. Tension headache tylenol prn for now    12. Autonomic dysfunction    13. Osteoarthritis of knee, unspecified laterality, unspecified osteoarthritis type    14. Osteoporosis, unspecified osteoporosis type, unspecified pathological fracture presence    15. Simple renal cyst    16. Anxiety cont med    17. Chronic fatigue    18. Insomnia, unspecified type    19. Tobacco use    20. Inversion of nipple    21. Lung nodule  -     CT Chest Without Contrast    22. Right lower quadrant abdominal pain  -     CT Abdomen Pelvis With & Without Contrast    Vaccine dexa  next  Follow Up:  Return in about 6 months (around 12/25/2020) for Recheck.   1 mo after lab and CT  An After Visit Summary and PPPS were given to the patient.         historical data below   tob, CT lung 2014, lung nodule, scarring and emphysema. Stable low dose CT repeat   hyperglycemia, continue diet  vitD low continue vitD3 1000u daily  anxiety continue zoloft 150 mg daily.   and klonopin 1mg, 1/2 tab po prn , again emphasized as needed basis.  Continue cymbalta 60 bid.  Patient refused to take BuSpar or see psychiatrist.  CT scan showed liver cyst, large kidney cyst, adrenal gland adenoma, kidney stones non-obstructing. repeat showed no change 1/2018  right knee OA on xray aleve prn knee brace help  hip pain s/p steroid helped improved  Hyperlipidemia stable on medicine continue need a blood tests  Hypertension normal without med,   Osteoporosis continue medication, refuse dexa   heme+, Colon polyps repeat 6/2019. EGD done  gastritis continue protonix  COPD continue medication. need to Quit tobacco. tudorsa, proair, continue O2 prn, symbicort refill nebulizer  Insomnia continue trazodone 100mg    Fibromyalgia stable watch continue medicine and follow up with pain clinic,with lortab  VacUTD, pneumovax again 9/14/17, in pharmacy flu shot today  Tension HA continue flexeril and naproxen  allergy flonase continue  sinus polyp per dentist, obtain rec.  ref to ENT.patient refuses, azelastine and Claritin D 5 mg in the morning  Nipple retraction mamm and US:negative pathology  tob to quit,   Cysts in liver and kidney, watch  Kidney lesion renal image CT renal protocol cyst and stone non obstructing  A.fib and pericardia eff, follow cardio patient to schedule  Left adrenal tu benign per radilogist   Liver cyst on CT watch  Right neck SK watch for now, patient to call if increase in size or change in color  Colon 7/24/19, repeat in 3 years

## 2020-07-02 ENCOUNTER — HOSPITAL ENCOUNTER (OUTPATIENT)
Dept: CT IMAGING | Facility: HOSPITAL | Age: 75
Discharge: HOME | End: 2020-07-02

## 2020-07-02 ENCOUNTER — HOSPITAL ENCOUNTER (OUTPATIENT)
Dept: CT IMAGING | Facility: HOSPITAL | Age: 75
Discharge: HOME | End: 2020-07-02
Admitting: INTERNAL MEDICINE

## 2020-07-02 LAB — CREAT BLDA-MCNC: 0.9 MG/DL (ref 0.6–1.3)

## 2020-07-02 PROCEDURE — 82565 ASSAY OF CREATININE: CPT

## 2020-07-02 PROCEDURE — 74178 CT ABD&PLV WO CNTR FLWD CNTR: CPT

## 2020-07-02 PROCEDURE — 71250 CT THORAX DX C-: CPT

## 2020-07-02 PROCEDURE — 25010000002 IOPAMIDOL 61 % SOLUTION: Performed by: INTERNAL MEDICINE

## 2020-07-02 RX ADMIN — IOPAMIDOL 100 ML: 612 INJECTION, SOLUTION INTRAVENOUS at 16:07

## 2020-07-08 RX ORDER — DOXYCYCLINE 50 MG/1
50 CAPSULE ORAL 2 TIMES DAILY
Qty: 28 CAPSULE | Refills: 0 | Status: SHIPPED | OUTPATIENT
Start: 2020-07-08 | End: 2020-09-08

## 2020-07-16 ENCOUNTER — OFFICE VISIT (OUTPATIENT)
Dept: INTERNAL MEDICINE | Facility: CLINIC | Age: 75
End: 2020-07-16

## 2020-07-16 VITALS
TEMPERATURE: 97.3 F | BODY MASS INDEX: 20.16 KG/M2 | HEIGHT: 65 IN | SYSTOLIC BLOOD PRESSURE: 122 MMHG | RESPIRATION RATE: 16 BRPM | WEIGHT: 121 LBS | OXYGEN SATURATION: 93 % | DIASTOLIC BLOOD PRESSURE: 84 MMHG | HEART RATE: 91 BPM

## 2020-07-16 DIAGNOSIS — N28.1 SIMPLE RENAL CYST: ICD-10-CM

## 2020-07-16 DIAGNOSIS — L98.9 SKIN LESION: ICD-10-CM

## 2020-07-16 DIAGNOSIS — E78.5 HYPERLIPIDEMIA, UNSPECIFIED HYPERLIPIDEMIA TYPE: ICD-10-CM

## 2020-07-16 DIAGNOSIS — K21.9 GASTROESOPHAGEAL REFLUX DISEASE WITHOUT ESOPHAGITIS: ICD-10-CM

## 2020-07-16 DIAGNOSIS — D35.00 ADRENAL ADENOMA, UNSPECIFIED LATERALITY: ICD-10-CM

## 2020-07-16 DIAGNOSIS — K80.21 CALCULUS OF GALLBLADDER WITH BILIARY OBSTRUCTION BUT WITHOUT CHOLECYSTITIS: ICD-10-CM

## 2020-07-16 DIAGNOSIS — J98.4 LUNG ABNORMALITY: ICD-10-CM

## 2020-07-16 DIAGNOSIS — R32 URINARY INCONTINENCE, UNSPECIFIED TYPE: ICD-10-CM

## 2020-07-16 DIAGNOSIS — J43.9 PULMONARY EMPHYSEMA, UNSPECIFIED EMPHYSEMA TYPE (HCC): ICD-10-CM

## 2020-07-16 DIAGNOSIS — I48.0 PAROXYSMAL ATRIAL FIBRILLATION (HCC): ICD-10-CM

## 2020-07-16 DIAGNOSIS — R53.82 CHRONIC FATIGUE: ICD-10-CM

## 2020-07-16 DIAGNOSIS — Z72.0 TOBACCO USE: ICD-10-CM

## 2020-07-16 DIAGNOSIS — I10 ESSENTIAL HYPERTENSION: Primary | ICD-10-CM

## 2020-07-16 PROBLEM — R12 HEARTBURN: Chronic | Status: RESOLVED | Noted: 2019-06-24 | Resolved: 2020-07-16

## 2020-07-16 PROCEDURE — 99214 OFFICE O/P EST MOD 30 MIN: CPT | Performed by: INTERNAL MEDICINE

## 2020-07-16 RX ORDER — OXYBUTYNIN CHLORIDE 10 MG/1
10 TABLET, EXTENDED RELEASE ORAL DAILY
Qty: 30 TABLET | Refills: 1 | Status: SHIPPED | OUTPATIENT
Start: 2020-07-16 | End: 2020-08-17

## 2020-07-16 NOTE — PROGRESS NOTES
Subjective   Gabi Dudley is a 75 y.o. female.     Chief Complaint   Patient presents with   • Pneumonia     Pt f/u on pneumonia from CT       History of Present Illness   Patient here for follow-up of.  CT scan of the chest that showed patchy  opacification patient is taking antibiotics now need to repeat in 1 month CT scan.  Abdominal CT scan showed gallstone otherwise adrenal tumor renal cysts liver cysts no change.  Patient occasional have a abdominal pain.  Still has right upper quadrant pain comes and goes.  Also complains neck skin lesion patient worried about history of skin cancer.  Still smokes.  Also complains of urine incontinence cannot wait for too long patient does use a depend.  Blood pressure stable now.  Atrial fibrillation stable on medication.  Emphysema stable.    Current Outpatient Medications:   •  albuterol (ACCUNEB) 1.25 MG/3ML nebulizer solution, 1 ampule Q6H. J43.9, Disp: 30 vial, Rfl: 3  •  albuterol (PROVENTIL HFA;VENTOLIN HFA) 108 (90 Base) MCG/ACT inhaler, Inhale 2 puffs Every 4 (Four) Hours As Needed for Wheezing., Disp: 1 inhaler, Rfl: 5  •  atorvastatin (LIPITOR) 20 MG tablet, Take 1 tablet by mouth Daily., Disp: 90 tablet, Rfl: 3  •  Azelastine HCl 137 MCG/SPRAY solution, 1 spray into the nostril(s) as directed by provider 2 (Two) Times a Day., Disp: 30 mL, Rfl: 1  •  budesonide-formoterol (SYMBICORT) 160-4.5 MCG/ACT inhaler, Inhale 2 puffs 2 (Two) Times a Day., Disp: 1 inhaler, Rfl: 12  •  clonazePAM (KlonoPIN) 1 MG tablet, TAKE 1 TABLET BY MOUTH EVERY EVENING IF NEEDED FOR ANXIETY, Disp: 90 tablet, Rfl: 0  •  cyanocobalamin 1000 MCG/ML injection, Inject 1 mL into the appropriate muscle as directed by prescriber Every 28 (Twenty-Eight) Days for 280 days., Disp: 10 mL, Rfl: 0  •  doxycycline (MONODOX) 50 MG capsule, Take 1 capsule by mouth 2 (Two) Times a Day., Disp: 28 capsule, Rfl: 0  •  DULoxetine (CYMBALTA) 60 MG capsule, Take 1 capsule by mouth 2 (Two) Times a Day., Disp:  180 capsule, Rfl: 0  •  HYDROcodone-acetaminophen (NORCO)  MG per tablet, take 1 tablet by mouth three times a day, Disp: , Rfl: 0  •  pantoprazole (PROTONIX) 40 MG EC tablet, Take 1 tablet by mouth Daily., Disp: 90 tablet, Rfl: 0  •  rivaroxaban (Xarelto) 20 MG tablet, Take 1 tablet by mouth Daily., Disp: 30 tablet, Rfl: 6  •  sertraline (ZOLOFT) 100 MG tablet, 1 and 1/2 tab daily po, Disp: 135 tablet, Rfl: 1  •  traZODone (DESYREL) 100 MG tablet, Take 1 tablet by mouth Every Evening., Disp: 60 tablet, Rfl: 5  •  VENTOLIN  (90 Base) MCG/ACT inhaler, inhale 2 puffs by mouth every 4 hours if needed for wheezing, Disp: 18 g, Rfl: 5  •  oxybutynin XL (Ditropan XL) 10 MG 24 hr tablet, Take 1 tablet by mouth Daily., Disp: 30 tablet, Rfl: 1    The following portions of the patient's history were reviewed and updated as appropriate: allergies, current medications, past family history, past medical history, past social history, past surgical history and problem list.    Review of Systems   Constitutional: Negative.    Respiratory: Negative.    Cardiovascular: Negative.    Gastrointestinal: Negative.    Musculoskeletal: Negative.    Skin: Negative.         Skin lesion neck right   Neurological: Negative.    Psychiatric/Behavioral: Negative.        Objective   Physical Exam   Constitutional: She is oriented to person, place, and time. She appears well-nourished.   Neck: Neck supple.   Cardiovascular: Normal rate, regular rhythm and normal heart sounds.   Pulmonary/Chest: Effort normal and breath sounds normal.   Abdominal: Bowel sounds are normal.   Neurological: She is alert and oriented to person, place, and time.   Skin: Skin is warm.   Right neck skin lesion    Psychiatric: She has a normal mood and affect.       All tests have been reviewed.    Assessment/Plan   Diagnoses and all orders for this visit:    Essential hypertension continue medication    Paroxysmal atrial fibrillation (CMS/HCC) continue  medication    Pulmonary emphysema, unspecified emphysema type (CMS/HCC) continue medication    Adrenal adenoma, unspecified laterality no change on CT scan    Simple renal cyst no change on CT scan    Chronic fatigue stable    Tobacco use  To quit  Gastroesophageal reflux disease without esophagitis continue medication stable now on medication    Hyperlipidemia, unspecified hyperlipidemia type continue medication    Lung abnormality patchy infiltrate repeat in 1 months  -     CT Chest Without Contrast; Future    Skin lesion need to do a biopsy next time    Urinary incontinence, unspecified type initiate medication  -     oxybutynin XL (Ditropan XL) 10 MG 24 hr tablet; Take 1 tablet by mouth Daily.    Calculus of gallbladder with biliary obstruction but without cholecystitis right upper quadrant pain comes and goes patient declines to see surgeon for the time being  Follow-up within 5 weeks              historical data below   tob, CT lung 2014, lung nodule, scarring and emphysema. Stable low dose CT repeat   hyperglycemia, continue diet  vitD low continue vitD3 1000u daily  anxiety continue zoloft 150 mg daily.   and klonopin 1mg, 1/2 tab po prn , again emphasized as needed basis.  Continue cymbalta 60 bid.  Patient refused to take BuSpar or see psychiatrist.  CT scan showed liver cyst, large kidney cyst, adrenal gland adenoma, kidney stones non-obstructing. repeat showed no change 6/2020,  Positive gallstone patient wants to wait to see surgeon.  Abdominal pain comes and goes  right knee OA on xray aleve prn knee brace help  hip pain s/p steroid helped improved  Hyperlipidemia stable on medicine continue need a blood tests  Hypertension normal without med,   Osteoporosis continue medication, refuse dexa   heme+, Colon polyps repeat 6/2019. EGD done  gastritis continue protonix  COPD continue medication. need to Quit tobacco. tudorsa, proair, continue O2 prn, symbicort refill nebulizer  Insomnia continue trazodone  100mg   Fibromyalgia stable watch continue medicine and follow up with pain clinic,with lortab  VacUTD, pneumovax again 9/14/17, in pharmacy flu shot today  Tension HA continue flexeril and naproxen  allergy flonase continue  sinus polyp per dentist, obtain rec.  ref to ENT.patient refuses, azelastine and Claritin D 5 mg in the morning  Nipple retraction mamm and US:negative pathology  tob to quit,   Cysts in liver and kidney, watch  Kidney lesion renal image CT renal protocol cyst and stone non obstructing  A.fib and pericardia eff, follow cardio patient to schedule  Left adrenal tu benign per radilogist   Liver cyst on CT watch  Right neck SK watch for now, patient to call if increase in size or change in color  Colon 7/24/19, repeat in 3 years

## 2020-07-21 DIAGNOSIS — I25.10 CORONARY ARTERY DISEASE INVOLVING NATIVE CORONARY ARTERY OF NATIVE HEART WITHOUT ANGINA PECTORIS: ICD-10-CM

## 2020-07-30 ENCOUNTER — TELEPHONE (OUTPATIENT)
Dept: INTERNAL MEDICINE | Facility: CLINIC | Age: 75
End: 2020-07-30

## 2020-07-30 NOTE — TELEPHONE ENCOUNTER
Patient's daughter called and stated that the patient has an appointment 8/5/20, needs a repeat CT Scan.  Issue with order from Dr. Quinteros: [facility]  Where does the scan need to be performed?    [facility] Callback: [phone]    Please advise

## 2020-07-30 NOTE — TELEPHONE ENCOUNTER
Is patient suppose to have repeat CT scan?? I cannot find a new order in chart for a new CT scan to be done. Please advise?

## 2020-07-31 ENCOUNTER — TELEPHONE (OUTPATIENT)
Dept: INTERNAL MEDICINE | Facility: CLINIC | Age: 75
End: 2020-07-31

## 2020-07-31 NOTE — TELEPHONE ENCOUNTER
Called [facility] patient's daughter in regards to her CT scan, I notified her that patient can get CT done downstairs as long as insurance approves this. I did give the daughter a number to call to downstairs to ask more questions about the CT scan.

## 2020-07-31 NOTE — TELEPHONE ENCOUNTER
CALLING REGARDING THE CT SCAN AND THE HOSPITAL SAID YOU CHECKED THE WRONG BOX SO THEY ARE UNABLE TO SCHEDULE IT. SHE WOULD LIKE TO TALK TO TRICIA CARRIZALES (DAUGHTER) PH: [phone]

## 2020-07-31 NOTE — TELEPHONE ENCOUNTER
Patient states she will discuss with Dr. Quinteros about having another CT scan done at her next appointment with him next Wednesday

## 2020-08-04 ENCOUNTER — HOSPITAL ENCOUNTER (OUTPATIENT)
Dept: CT IMAGING | Facility: HOSPITAL | Age: 75
Discharge: HOME | End: 2020-08-04
Admitting: INTERNAL MEDICINE

## 2020-08-04 DIAGNOSIS — J98.4 LUNG ABNORMALITY: ICD-10-CM

## 2020-08-04 PROCEDURE — 71250 CT THORAX DX C-: CPT

## 2020-08-04 RX ORDER — ATORVASTATIN CALCIUM 20 MG/1
TABLET, FILM COATED ORAL
Qty: 90 TABLET | Refills: 3 | Status: SHIPPED | OUTPATIENT
Start: 2020-08-04 | End: 2021-08-11

## 2020-08-04 RX ORDER — DULOXETIN HYDROCHLORIDE 60 MG/1
CAPSULE, DELAYED RELEASE ORAL
Qty: 180 CAPSULE | Refills: 1 | Status: SHIPPED | OUTPATIENT
Start: 2020-08-04 | End: 2021-02-22

## 2020-08-04 RX ORDER — PANTOPRAZOLE SODIUM 40 MG/1
40 TABLET, DELAYED RELEASE ORAL DAILY
Qty: 90 TABLET | Refills: 3 | Status: SHIPPED | OUTPATIENT
Start: 2020-08-04 | End: 2021-10-05

## 2020-08-05 ENCOUNTER — TELEPHONE (OUTPATIENT)
Dept: CARDIOLOGY | Facility: CLINIC | Age: 75
End: 2020-08-05

## 2020-08-05 ENCOUNTER — OFFICE VISIT (OUTPATIENT)
Dept: INTERNAL MEDICINE | Facility: CLINIC | Age: 75
End: 2020-08-05

## 2020-08-05 VITALS
HEIGHT: 65 IN | HEART RATE: 94 BPM | RESPIRATION RATE: 16 BRPM | TEMPERATURE: 97.8 F | BODY MASS INDEX: 20.68 KG/M2 | OXYGEN SATURATION: 93 % | WEIGHT: 124.12 LBS | DIASTOLIC BLOOD PRESSURE: 72 MMHG | SYSTOLIC BLOOD PRESSURE: 110 MMHG

## 2020-08-05 DIAGNOSIS — Z72.0 TOBACCO USE: ICD-10-CM

## 2020-08-05 DIAGNOSIS — I31.39 PERICARDIAL EFFUSION: ICD-10-CM

## 2020-08-05 DIAGNOSIS — I25.10 CORONARY ARTERY DISEASE INVOLVING NATIVE CORONARY ARTERY OF NATIVE HEART WITHOUT ANGINA PECTORIS: ICD-10-CM

## 2020-08-05 DIAGNOSIS — J98.4 LUNG ABNORMALITY: ICD-10-CM

## 2020-08-05 DIAGNOSIS — L98.9 SKIN LESION OF NECK: ICD-10-CM

## 2020-08-05 DIAGNOSIS — K21.9 GASTROESOPHAGEAL REFLUX DISEASE WITHOUT ESOPHAGITIS: ICD-10-CM

## 2020-08-05 DIAGNOSIS — J43.9 PULMONARY EMPHYSEMA, UNSPECIFIED EMPHYSEMA TYPE (HCC): ICD-10-CM

## 2020-08-05 DIAGNOSIS — K80.21 CALCULUS OF GALLBLADDER WITH BILIARY OBSTRUCTION BUT WITHOUT CHOLECYSTITIS: ICD-10-CM

## 2020-08-05 DIAGNOSIS — E55.9 VITAMIN D DEFICIENCY: ICD-10-CM

## 2020-08-05 DIAGNOSIS — E78.5 HYPERLIPIDEMIA, UNSPECIFIED HYPERLIPIDEMIA TYPE: Primary | ICD-10-CM

## 2020-08-05 DIAGNOSIS — N39.41 URGE INCONTINENCE: ICD-10-CM

## 2020-08-05 DIAGNOSIS — I48.0 PAROXYSMAL ATRIAL FIBRILLATION (HCC): ICD-10-CM

## 2020-08-05 DIAGNOSIS — I10 ESSENTIAL HYPERTENSION: ICD-10-CM

## 2020-08-05 DIAGNOSIS — D35.00 ADRENAL ADENOMA, UNSPECIFIED LATERALITY: ICD-10-CM

## 2020-08-05 PROCEDURE — 11104 PUNCH BX SKIN SINGLE LESION: CPT | Performed by: INTERNAL MEDICINE

## 2020-08-05 PROCEDURE — 99214 OFFICE O/P EST MOD 30 MIN: CPT | Performed by: INTERNAL MEDICINE

## 2020-08-05 NOTE — TELEPHONE ENCOUNTER
Pt called asking for samples of Xarelto. We are currently out of the 20mg and Pts daughter states that she was given 15mg last time per your okay. Is it okay if we give her 15mg again? Or is it okay to do 10mg tablet and she double the dosing? Please advise.

## 2020-08-05 NOTE — PROGRESS NOTES
Subjective   Gabi Dudley is a 75 y.o. female.     Chief Complaint   Patient presents with   • Imaging Only     f/u on CT results        History of Present Illness   Patient here for follow-up.  Lung nodule opacity improved after antibiotics.  Patient states air movement improved transiently is able to be coughed up.  Urine incontinence improved after Ditropan.  Right-sided neck nevus now change in shape patient worried about it patient requests to have it removed for biopsy.  Blood pressure stable now.  GERD stable now.  Denies any abdominal pain now even though patient has a gallstone.  Patient still smokes.    Current Outpatient Medications:   •  albuterol (ACCUNEB) 1.25 MG/3ML nebulizer solution, 1 ampule Q6H. J43.9, Disp: 30 vial, Rfl: 3  •  albuterol (PROVENTIL HFA;VENTOLIN HFA) 108 (90 Base) MCG/ACT inhaler, Inhale 2 puffs Every 4 (Four) Hours As Needed for Wheezing., Disp: 1 inhaler, Rfl: 5  •  atorvastatin (LIPITOR) 20 MG tablet, TAKE 1 TABLET BY MOUTH ONCE DAILY, Disp: 90 tablet, Rfl: 3  •  Azelastine HCl 137 MCG/SPRAY solution, 1 spray into the nostril(s) as directed by provider 2 (Two) Times a Day., Disp: 30 mL, Rfl: 1  •  budesonide-formoterol (SYMBICORT) 160-4.5 MCG/ACT inhaler, Inhale 2 puffs 2 (Two) Times a Day., Disp: 1 inhaler, Rfl: 12  •  clonazePAM (KlonoPIN) 1 MG tablet, TAKE 1 TABLET BY MOUTH EVERY EVENING IF NEEDED FOR ANXIETY, Disp: 90 tablet, Rfl: 0  •  doxycycline (MONODOX) 50 MG capsule, Take 1 capsule by mouth 2 (Two) Times a Day., Disp: 28 capsule, Rfl: 0  •  DULoxetine (CYMBALTA) 60 MG capsule, TAKE ONE CAPSULE BY MOUTH TWICE DAILY, Disp: 180 capsule, Rfl: 1  •  HYDROcodone-acetaminophen (NORCO)  MG per tablet, take 1 tablet by mouth three times a day, Disp: , Rfl: 0  •  oxybutynin XL (Ditropan XL) 10 MG 24 hr tablet, Take 1 tablet by mouth Daily., Disp: 30 tablet, Rfl: 1  •  pantoprazole (PROTONIX) 40 MG EC tablet, TAKE 1 TABLET BY MOUTH DAILY, Disp: 90 tablet, Rfl: 3  •   rivaroxaban (Xarelto) 20 MG tablet, Take 1 tablet by mouth Daily., Disp: 30 tablet, Rfl: 6  •  sertraline (ZOLOFT) 100 MG tablet, 1 and 1/2 tab daily po, Disp: 135 tablet, Rfl: 1  •  traZODone (DESYREL) 100 MG tablet, Take 1 tablet by mouth Every Evening., Disp: 60 tablet, Rfl: 5  •  VENTOLIN  (90 Base) MCG/ACT inhaler, inhale 2 puffs by mouth every 4 hours if needed for wheezing, Disp: 18 g, Rfl: 5    The following portions of the patient's history were reviewed and updated as appropriate: allergies, current medications, past family history, past medical history, past social history, past surgical history and problem list.    Review of Systems   Constitutional: Negative.    Respiratory: Negative.    Cardiovascular: Negative.    Gastrointestinal: Negative.  Negative for abdominal pain.   Genitourinary: Positive for difficulty urinating.   Musculoskeletal: Negative.    Skin:        Right posterior neck nevus change in size   Neurological: Negative.    Psychiatric/Behavioral: Negative.        Objective   Physical Exam   Constitutional: She is oriented to person, place, and time. She appears well-developed and well-nourished.   Neck: Neck supple.   Cardiovascular: Normal rate, regular rhythm and normal heart sounds.   Pulmonary/Chest: Effort normal and breath sounds normal.   Abdominal: Bowel sounds are normal.   Neurological: She is alert and oriented to person, place, and time.   Skin: Skin is warm.   Right posterior neck 2 mm nevus changing in size   Psychiatric: She has a normal mood and affect.       All tests have been reviewed.    Assessment/Plan   Diagnoses and all orders for this visit:    Hyperlipidemia, unspecified hyperlipidemia type    Essential hypertension    Paroxysmal atrial fibrillation (CMS/HCC)    Pericardial effusion    Coronary artery disease involving native coronary artery of native heart without angina pectoris    Pulmonary emphysema, unspecified emphysema type (CMS/HCC)    Lung  abnormality    Vitamin D deficiency    Gastroesophageal reflux disease without esophagitis    Calculus of gallbladder with biliary obstruction but without cholecystitis    Adrenal adenoma, unspecified laterality    Tobacco use    Skin lesion of neck  -     Reference Histopathology    Urge incontinence    Other orders  -     Biopsy        Follow-up in 1 month may need to repeat CT scan of the chest to follow-up with opacity.      Essential hypertension continue medication     Paroxysmal atrial fibrillation (CMS/HCC) continue medication     Pulmonary emphysema, unspecified emphysema type (CMS/HCC) continue medication     Adrenal adenoma, unspecified laterality no change on CT scan    Lung opacification stable after medicine, repeat in a month--     Simple renal cyst no change on CT scan     Chronic fatigue stable     Tobacco use To quit    Gastroesophageal reflux disease without esophagitis continue medication stable now on medication     Hyperlipidemia, unspecified hyperlipidemia type continue medication     Skin lesion  biopsy today     Urinary incontinence,improved after med     Calculus of gallbladder with biliary obstruction but without cholecystitis right upper quadrant pain comes and goes patient declines to see surgeon for the time being        Biopsy  Date/Time: 8/5/2020 5:16 PM  Performed by: Paul Quinteros MD  Authorized by: Paul Quinteros MD     Procedure Details - Skin Biopsy:     Body area: head/neck    Head/neck location: neck    Initial size (mm): 2    Malignancy: benign lesion      Destruction method: punch biopsy      Comments:   Right posterior neck dark skin lesion                  historical data below   tob, CT lung 2014, lung nodule, scarring and emphysema. Stable low dose CT repeat   hyperglycemia, continue diet  vitD low continue vitD3 1000u daily  anxiety continue zoloft 150 mg daily.   and klonopin 1mg, 1/2 tab po prn , again emphasized as needed basis.  Continue cymbalta 60 bid.  Patient  refused to take BuSpar or see psychiatrist.  CT scan showed liver cyst, large kidney cyst, adrenal gland adenoma, kidney stones non-obstructing. repeat showed no change 6/2020,  Positive gallstone patient wants to wait to see surgeon.  Abdominal pain comes and goes  right knee OA on xray aleve prn knee brace help  hip pain s/p steroid helped improved  Hyperlipidemia stable on medicine continue need a blood tests  Hypertension normal without med,   Osteoporosis continue medication, refuse dexa   heme+, Colon polyps repeat 6/2019. EGD done  gastritis continue protonix  COPD continue medication. need to Quit tobacco. tudorsa, proair, continue O2 prn, symbicort refill nebulizer  Insomnia continue trazodone 100mg   Fibromyalgia stable watch continue medicine and follow up with pain clinic,with lortab  VacUTD, pneumovax again 9/14/17, in pharmacy flu shot today  Tension HA continue flexeril and naproxen  allergy flonase continue  sinus polyp per dentist, obtain rec.  ref to ENT.patient refuses, azelastine and Claritin D 5 mg in the morning  Nipple retraction mamm and US:negative pathology  tob to quit,   Cysts in liver and kidney, watch  Kidney lesion renal image CT renal protocol cyst and stone non obstructing  A.fib and pericardia eff, follow cardio patient to schedule  Left adrenal tu benign per radilogist   Liver cyst on CT watch  Right neck SK watch for now, patient to call if increase in size or change in color  Colon 7/24/19, repeat in 3 years

## 2020-08-10 DIAGNOSIS — I25.10 CORONARY ARTERY DISEASE INVOLVING NATIVE CORONARY ARTERY OF NATIVE HEART WITHOUT ANGINA PECTORIS: ICD-10-CM

## 2020-08-10 NOTE — TELEPHONE ENCOUNTER
"Per    \"OK to give 15 mg samples for now, we will discuss anticoagulants at next follow-up\"  Samples left at  for Pt .   "

## 2020-08-16 DIAGNOSIS — R32 URINARY INCONTINENCE, UNSPECIFIED TYPE: ICD-10-CM

## 2020-08-17 RX ORDER — OXYBUTYNIN CHLORIDE 10 MG/1
10 TABLET, EXTENDED RELEASE ORAL DAILY
Qty: 90 TABLET | Refills: 1 | Status: SHIPPED | OUTPATIENT
Start: 2020-08-17 | End: 2020-09-22

## 2020-08-25 ENCOUNTER — OFFICE VISIT (OUTPATIENT)
Dept: CARDIOLOGY | Facility: CLINIC | Age: 75
End: 2020-08-25

## 2020-08-25 VITALS
OXYGEN SATURATION: 97 % | DIASTOLIC BLOOD PRESSURE: 62 MMHG | SYSTOLIC BLOOD PRESSURE: 112 MMHG | HEIGHT: 65 IN | BODY MASS INDEX: 20.66 KG/M2 | HEART RATE: 91 BPM | WEIGHT: 124 LBS

## 2020-08-25 DIAGNOSIS — I48.0 PAROXYSMAL ATRIAL FIBRILLATION (HCC): Primary | ICD-10-CM

## 2020-08-25 DIAGNOSIS — I10 ESSENTIAL HYPERTENSION: ICD-10-CM

## 2020-08-25 DIAGNOSIS — E78.5 HYPERLIPIDEMIA, UNSPECIFIED HYPERLIPIDEMIA TYPE: ICD-10-CM

## 2020-08-25 DIAGNOSIS — Z72.0 TOBACCO USE: ICD-10-CM

## 2020-08-25 DIAGNOSIS — I25.10 CORONARY ARTERY DISEASE INVOLVING NATIVE CORONARY ARTERY OF NATIVE HEART WITHOUT ANGINA PECTORIS: ICD-10-CM

## 2020-08-25 PROCEDURE — 99213 OFFICE O/P EST LOW 20 MIN: CPT | Performed by: INTERNAL MEDICINE

## 2020-08-25 NOTE — PROGRESS NOTES
[facility] Office Follow Up Note    Gabi Dudley  [id number]  2020    Primary Care Provider: Paul Quinteros MD    Chief Complaint: Regular follow-up    History of Present Illness:   Mrs. Gabi Dudley is a 75 y.o. female who presents to the Cardiology Clinic for  regular follow-up.  The patient has a past medical history significant for hypertension, dyslipidemia, prior pulmonary embolism, fibromyalgia, anxiety, and chronic tobacco use complicated by COPD.  Her past cardiac history is significant for atrial fibrillation, with an ECG in  demonstrating atrial fibrillation with a ventricular rate of 118 bpm. The patient also has a history of nonobstructive coronary artery disease, reportedly diagnosed on remote coronary angiography.  She presents the cardiology clinic today for regular follow-up.  Since her last appointment, the patient reports she has been well.  She denies any significant changes in her health.  She denies any recent episodes of palpitations.  No dizziness, lightheadedness, or presyncopal symptoms.  No syncopal events.  She denies exertional dyspnea.  No exertional chest pain or anginal symptoms.  She continues to tolerate Xarelto without significant bleeding or bruising.  No specific complaints at this time.        Past Cardiac Testin. Last Coronary Angio:  , reportedly nonobstructive disease.  Report unavailable  2. Prior Stress Testing: Remote, with report unavailable  3. Last Echo: 2020   1.  Normal left ventricular size with low normal LV systolic function, LVEF 50-55%.   2.  Mild concentric LVH.   3.  Normal LV diastolic filling pattern.   4.  Mild right ventricular dilation with normal RV systolic function.   5.  Normal left and right atrial size.   6.  Trace MR and TR.  4. Prior Holter Monitor: 48-hour Holter 2020   1.  The predominant rhythm is sinus rhythm with an average heart rate 90 bpm.   2.  Normal atrioventricular  "conduction.   3.  No sustained supraventricular or ventricular arrhythmias.   4.  No episodes of paroxysmal atrial fibrillation.   5.  Rare supraventricular ectopy with isolated PACs, burden <0.1%.   6.  Rare ventricular ectopy with isolated and pairs of PVCs, burden <0.1%.   7.  One symptomatic episode of \"anxiety, chest pain, shortness of air\" corresponded to NSR at 91 bpm.    Review of Systems:   Review of Systems   Constitutional: Negative for activity change, appetite change, chills, diaphoresis, fatigue, fever, unexpected weight gain and unexpected weight loss.   Eyes: Negative for blurred vision and double vision.   Respiratory: Negative for cough, chest tightness, shortness of breath and wheezing.    Cardiovascular: Negative for chest pain, palpitations and leg swelling.   Gastrointestinal: Negative for abdominal pain, anal bleeding, blood in stool and GERD.   Endocrine: Negative for cold intolerance and heat intolerance.   Genitourinary: Negative for hematuria.   Neurological: Negative for dizziness, syncope, weakness and light-headedness.   Hematological: Does not bruise/bleed easily.   Psychiatric/Behavioral: Negative for depressed mood and stress. The patient is not nervous/anxious.        I have reviewed and/or updated the patient's past medical, past surgical, family, social history, problem list and allergies as appropriate.     Medications:     Current Outpatient Medications:   •  albuterol (ACCUNEB) 1.25 MG/3ML nebulizer solution, 1 ampule Q6H. J43.9, Disp: 30 vial, Rfl: 3  •  albuterol (PROVENTIL HFA;VENTOLIN HFA) 108 (90 Base) MCG/ACT inhaler, Inhale 2 puffs Every 4 (Four) Hours As Needed for Wheezing., Disp: 1 inhaler, Rfl: 5  •  atorvastatin (LIPITOR) 20 MG tablet, TAKE 1 TABLET BY MOUTH ONCE DAILY, Disp: 90 tablet, Rfl: 3  •  Azelastine HCl 137 MCG/SPRAY solution, 1 spray into the nostril(s) as directed by provider 2 (Two) Times a Day., Disp: 30 mL, Rfl: 1  •  clonazePAM (KlonoPIN) 1 MG tablet, " "TAKE 1 TABLET BY MOUTH EVERY EVENING IF NEEDED FOR ANXIETY, Disp: 90 tablet, Rfl: 0  •  DULoxetine (CYMBALTA) 60 MG capsule, TAKE ONE CAPSULE BY MOUTH TWICE DAILY, Disp: 180 capsule, Rfl: 1  •  HYDROcodone-acetaminophen (NORCO)  MG per tablet, take 1 tablet by mouth three times a day, Disp: , Rfl: 0  •  oxybutynin XL (DITROPAN-XL) 10 MG 24 hr tablet, TAKE 1 TABLET BY MOUTH DAILY, Disp: 90 tablet, Rfl: 1  •  pantoprazole (PROTONIX) 40 MG EC tablet, TAKE 1 TABLET BY MOUTH DAILY, Disp: 90 tablet, Rfl: 3  •  sertraline (ZOLOFT) 100 MG tablet, 1 and 1/2 tab daily po, Disp: 135 tablet, Rfl: 1  •  traZODone (DESYREL) 100 MG tablet, Take 1 tablet by mouth Every Evening., Disp: 60 tablet, Rfl: 5  •  VENTOLIN  (90 Base) MCG/ACT inhaler, inhale 2 puffs by mouth every 4 hours if needed for wheezing, Disp: 18 g, Rfl: 5  •  budesonide-formoterol (SYMBICORT) 160-4.5 MCG/ACT inhaler, Inhale 2 puffs 2 (Two) Times a Day., Disp: 1 inhaler, Rfl: 12  •  doxycycline (MONODOX) 50 MG capsule, Take 1 capsule by mouth 2 (Two) Times a Day., Disp: 28 capsule, Rfl: 0  •  rivaroxaban (Xarelto) 20 MG tablet, Take 1 tablet by mouth Daily., Disp: 30 tablet, Rfl: 6    Physical Exam:  Vital Signs:   Vitals:    08/25/20 1526   BP: 112/62   BP Location: Right arm   Patient Position: Sitting   Cuff Size: Adult   Pulse: 91   SpO2: 97%   Weight: 56.2 kg (124 lb)   Height: 165.1 cm (65\")       Physical Exam   Constitutional: She is oriented to person, place, and time. She appears well-developed and well-nourished. No distress.   HENT:   Head: Normocephalic and atraumatic.   Moist Mucous Membranes.    Eyes: Pupils are equal, round, and reactive to light. EOM are normal. No scleral icterus.   Neck: No tracheal deviation present.   Cardiovascular: Normal rate, regular rhythm, normal heart sounds and intact distal pulses. Exam reveals no gallop and no friction rub.   No murmur heard.  Normal JVD.   Pulmonary/Chest: Effort normal and breath sounds " normal. No stridor. No respiratory distress. She has no wheezes. She has no rales. She exhibits no tenderness.   Abdominal: Soft. Bowel sounds are normal. She exhibits no distension. There is no tenderness. There is no rebound and no guarding.   Musculoskeletal: Normal range of motion. She exhibits no edema.   Lymphadenopathy:     She has no cervical adenopathy.   Neurological: She is alert and oriented to person, place, and time.   Skin: Skin is warm and dry. She is not diaphoretic. No erythema.   Psychiatric: She has a normal mood and affect. Her behavior is normal.       Results Review:   I reviewed the patient's new clinical results.        Assessment / Plan:     1.  Paroxysmal atrial fibrillation   --Known history of paroxysmal atrial fibrillation  --Currently appears to be asymptomatic, no recent palpitations  --Given elevated CHADS2-VASc score of 4, continue Xarelto for CVA prophylaxis  --Given financial concerns, will look into options for financial assistance with either Xarelto or Eliquis.  If unable to improve the price, may then need to consider Coumadin  --Unlikely to be able to tolerate AV taya blockade due to orthostatic hypotension  --Follow-up in 6 months, or sooner if required     2.  Coronary artery disease   --History of nonobstructive coronary artery disease based on remote coronary angiography  --No current anginal symptoms  --Continue atorvastatin      3.  Essential hypertension  --Normotensive today     4.  Autonomic dysfunction  --History of autonomic dysfunction with intermittent symptoms of orthostasis  --Orthostatic symptoms remain minimal, no progression since last follow-up  --If worsening of symptoms, may then consider initiation of Miodrin or fluticasone     5.  Chronic tobacco use  --Complete cessation advised        Preventative Cardiology:   Tobacco Cessation: Cessation Counseling Provided   Obstructive Sleep Apnea Screening: Deffered  AAA Screening: N/A  Peripheral Arterial  Disease Screening: N/A    Follow Up:   Return in about 6 months (around 2/25/2021).      Thank you for allowing me to participate in the care of your patient. Please to not hesitate to contact me with additional questions or concerns.     VIDAL Curry MD  Interventional Cardiology   08/25/2020  3:31 PM

## 2020-09-08 ENCOUNTER — OFFICE VISIT (OUTPATIENT)
Dept: INTERNAL MEDICINE | Facility: CLINIC | Age: 75
End: 2020-09-08

## 2020-09-08 VITALS
SYSTOLIC BLOOD PRESSURE: 110 MMHG | TEMPERATURE: 97.7 F | BODY MASS INDEX: 20.99 KG/M2 | DIASTOLIC BLOOD PRESSURE: 74 MMHG | HEART RATE: 97 BPM | WEIGHT: 126 LBS | HEIGHT: 65 IN | OXYGEN SATURATION: 94 %

## 2020-09-08 DIAGNOSIS — I48.0 PAROXYSMAL ATRIAL FIBRILLATION (HCC): ICD-10-CM

## 2020-09-08 DIAGNOSIS — K80.21 CALCULUS OF GALLBLADDER WITH BILIARY OBSTRUCTION BUT WITHOUT CHOLECYSTITIS: ICD-10-CM

## 2020-09-08 DIAGNOSIS — E55.9 VITAMIN D DEFICIENCY: ICD-10-CM

## 2020-09-08 DIAGNOSIS — K21.9 GASTROESOPHAGEAL REFLUX DISEASE WITHOUT ESOPHAGITIS: ICD-10-CM

## 2020-09-08 DIAGNOSIS — E78.5 HYPERLIPIDEMIA, UNSPECIFIED HYPERLIPIDEMIA TYPE: ICD-10-CM

## 2020-09-08 DIAGNOSIS — Z23 NEED FOR IMMUNIZATION AGAINST INFLUENZA: Primary | ICD-10-CM

## 2020-09-08 DIAGNOSIS — D35.00 ADRENAL ADENOMA, UNSPECIFIED LATERALITY: ICD-10-CM

## 2020-09-08 DIAGNOSIS — N39.41 URGE INCONTINENCE: ICD-10-CM

## 2020-09-08 DIAGNOSIS — I10 ESSENTIAL HYPERTENSION: ICD-10-CM

## 2020-09-08 DIAGNOSIS — J43.9 PULMONARY EMPHYSEMA, UNSPECIFIED EMPHYSEMA TYPE (HCC): ICD-10-CM

## 2020-09-08 DIAGNOSIS — Z72.0 TOBACCO USE: ICD-10-CM

## 2020-09-08 PROBLEM — L98.9 SKIN LESION OF NECK: Status: RESOLVED | Noted: 2020-07-16 | Resolved: 2020-09-08

## 2020-09-08 PROCEDURE — 90694 VACC AIIV4 NO PRSRV 0.5ML IM: CPT | Performed by: INTERNAL MEDICINE

## 2020-09-08 PROCEDURE — G0008 ADMIN INFLUENZA VIRUS VAC: HCPCS | Performed by: INTERNAL MEDICINE

## 2020-09-08 PROCEDURE — 99214 OFFICE O/P EST MOD 30 MIN: CPT | Performed by: INTERNAL MEDICINE

## 2020-09-08 RX ORDER — IPRATROPIUM BROMIDE AND ALBUTEROL SULFATE 2.5; .5 MG/3ML; MG/3ML
3 SOLUTION RESPIRATORY (INHALATION) EVERY 4 HOURS PRN
Qty: 360 ML | Refills: 11 | Status: SHIPPED | OUTPATIENT
Start: 2020-09-08 | End: 2021-12-06

## 2020-09-08 RX ORDER — BUDESONIDE AND FORMOTEROL FUMARATE DIHYDRATE 160; 4.5 UG/1; UG/1
2 AEROSOL RESPIRATORY (INHALATION)
Qty: 1 INHALER | Refills: 12 | Status: SHIPPED | OUTPATIENT
Start: 2020-09-08 | End: 2021-10-12

## 2020-09-08 NOTE — PROGRESS NOTES
Subjective   Gabi Dudley is a 75 y.o. female.     Chief Complaint   Patient presents with   • Hyperlipidemia     1 mo f/u    • Hypertension       History of Present Illness   Patient here for follow-up.  Hyperlipidemia stable on medication.  Atrial fibrillation stable on medication.  Hypertension stable on medication.  Pulmonary emphysema patient is not taking Symbicort due to price.  Lung nodule no significant changes.  GERD stable on medication.  Gallstone patient complains of occasional right upper quadrant abdominal pain radiated to the back.  Patient still smokes off and on.  Patient does have some wheezing.  Skin lesion benign urge incontinence improved after medication.    Current Outpatient Medications:   •  atorvastatin (LIPITOR) 20 MG tablet, TAKE 1 TABLET BY MOUTH ONCE DAILY, Disp: 90 tablet, Rfl: 3  •  Azelastine HCl 137 MCG/SPRAY solution, 1 spray into the nostril(s) as directed by provider 2 (Two) Times a Day., Disp: 30 mL, Rfl: 1  •  budesonide-formoterol (Symbicort) 160-4.5 MCG/ACT inhaler, Inhale 2 puffs 2 (Two) Times a Day., Disp: 1 inhaler, Rfl: 12  •  clonazePAM (KlonoPIN) 1 MG tablet, TAKE 1 TABLET BY MOUTH EVERY EVENING IF NEEDED FOR ANXIETY, Disp: 90 tablet, Rfl: 0  •  DULoxetine (CYMBALTA) 60 MG capsule, TAKE ONE CAPSULE BY MOUTH TWICE DAILY, Disp: 180 capsule, Rfl: 1  •  HYDROcodone-acetaminophen (NORCO)  MG per tablet, take 1 tablet by mouth three times a day, Disp: , Rfl: 0  •  oxybutynin XL (DITROPAN-XL) 10 MG 24 hr tablet, TAKE 1 TABLET BY MOUTH DAILY, Disp: 90 tablet, Rfl: 1  •  pantoprazole (PROTONIX) 40 MG EC tablet, TAKE 1 TABLET BY MOUTH DAILY, Disp: 90 tablet, Rfl: 3  •  rivaroxaban (Xarelto) 20 MG tablet, Take 1 tablet by mouth Daily., Disp: 30 tablet, Rfl: 6  •  sertraline (ZOLOFT) 100 MG tablet, 1 and 1/2 tab daily po, Disp: 135 tablet, Rfl: 1  •  traZODone (DESYREL) 100 MG tablet, Take 1 tablet by mouth Every Evening., Disp: 60 tablet, Rfl: 5  •  VENTOLIN  (90  Base) MCG/ACT inhaler, inhale 2 puffs by mouth every 4 hours if needed for wheezing, Disp: 18 g, Rfl: 5  •  ipratropium-albuterol (DUO-NEB) 0.5-2.5 mg/3 ml nebulizer, Take 3 mL by nebulization Every 4 (Four) Hours As Needed for Wheezing., Disp: 360 mL, Rfl: 11    The following portions of the patient's history were reviewed and updated as appropriate: allergies, current medications, past family history, past medical history, past social history, past surgical history and problem list.    Review of Systems   Constitutional: Negative.    Respiratory: Negative.    Cardiovascular: Negative.    Gastrointestinal: Negative.    Musculoskeletal: Negative.    Skin: Negative.    Neurological: Negative.    Psychiatric/Behavioral: Negative.        Objective   Physical Exam   Constitutional: She is oriented to person, place, and time. She appears well-developed and well-nourished.   Neck: Neck supple.   Cardiovascular: Normal rate, regular rhythm and normal heart sounds.   Pulmonary/Chest: Effort normal and breath sounds normal.   Abdominal: Bowel sounds are normal.   Neurological: She is alert and oriented to person, place, and time.   Skin: Skin is warm.   Psychiatric: She has a normal mood and affect.       All tests have been reviewed.    Assessment/Plan   Diagnoses and all orders for this visit:    Need for immunization against influenza  -     Fluad Quad 65+ yrs ([id number])    Hyperlipidemia, continue medication    Essential hypertension continue medication    Paroxysmal atrial fibrillation (CMS/HCC) continue medication    Gastroesophageal reflux disease without esophagitis continue medication    Tobacco use to quit    Urge incontinence continue medication    Pulmonary emphysema, unspecified emphysema type (CMS/HCC) resume Symbicort and initiate DuoNeb  -     ipratropium-albuterol (DUO-NEB) 0.5-2.5 mg/3 ml nebulizer; Take 3 mL by nebulization Every 4 (Four) Hours As Needed for Wheezing.    Vitamin D deficiency continue  supplement    Adrenal adenoma, unspecified laterality no significant changes on July 2020 CT scan    Calculus of gallbladder with biliary obstruction but without cholecystitis  -     Ambulatory Referral to General Surgery  2 months after blood tests               ----historical data below   tob, CT lung 2014, lung nodule, scarring and emphysema. Stable low dose CT   anxiety continue zoloft 150 mg daily.   and klonopin 1mg, 1/2 tab po prn , again emphasized as needed basis.  Continue cymbalta 60 bid.  Patient refused to take BuSpar or see psychiatrist.  CT scan showed liver cyst, large kidney cyst, adrenal gland adenoma, kidney stones non-obstructing. repeat showed no change 7/2020,  Positive gallstone patient wants to wait to see surgeon.  Abdominal pain comes and goes  right knee OA on xray aleve prn knee brace help  hip pain s/p steroid helped improved  Osteoporosis continue medication, refuse dexa   heme+, Colon polyps repeat 6/2019. EGD done  gastritis continue protonix  COPD continue medication. need to Quit tobacco. tudorsa, proair, continue O2 prn, symbicort refill nebulizer  Fibromyalgia stable watch continue medicine and follow up with pain clinic,with lortab  VacUTD, pneumovax again 9/14/17, in pharmacy flu shot today  Tension HA continue flexeril and naproxen  allergy flonase continue  sinus polyp per dentist, obtain rec.  ref to ENT.patient refuses, azelastine and Claritin D 5 mg in the morning  Nipple retraction mamm and US:negative pathology  tob to quit,   Cysts in liver and kidney, watch  Kidney lesion renal image CT renal protocol cyst and stone non obstructing  A.fib and pericardia eff, follow cardio patient to schedule  Left adrenal tu benign stable per radilogist   Liver cyst on CT watch  Right neck SK watch for now, patient to call if increase in size or change in color  Colon 7/24/19, repeat in 3 years

## 2020-09-09 DIAGNOSIS — I25.10 CORONARY ARTERY DISEASE INVOLVING NATIVE CORONARY ARTERY OF NATIVE HEART WITHOUT ANGINA PECTORIS: ICD-10-CM

## 2020-09-22 ENCOUNTER — TELEPHONE (OUTPATIENT)
Dept: CARDIOLOGY | Facility: CLINIC | Age: 75
End: 2020-09-22

## 2020-09-22 DIAGNOSIS — R32 URINARY INCONTINENCE, UNSPECIFIED TYPE: ICD-10-CM

## 2020-09-22 RX ORDER — OXYBUTYNIN CHLORIDE 10 MG/1
10 TABLET, EXTENDED RELEASE ORAL DAILY
Qty: 90 TABLET | Refills: 1 | Status: SHIPPED | OUTPATIENT
Start: 2020-09-22 | End: 2021-12-28

## 2020-09-22 NOTE — TELEPHONE ENCOUNTER
Pt is wanting to switch from xarelto back to eliquis. Pt daughter states that she is more fatigued, and has more complaints of headaches with the xarelto, and has less bruising with eliquis. Is it okay to do a tier reduction on eliquis? Please advise.

## 2020-09-24 ENCOUNTER — TELEPHONE (OUTPATIENT)
Dept: CARDIOLOGY | Facility: CLINIC | Age: 75
End: 2020-09-24

## 2020-09-25 ENCOUNTER — TELEPHONE (OUTPATIENT)
Dept: CARDIOLOGY | Facility: CLINIC | Age: 75
End: 2020-09-25

## 2020-09-28 RX ORDER — CLONAZEPAM 1 MG/1
TABLET ORAL
Qty: 30 TABLET | Refills: 0 | Status: SHIPPED | OUTPATIENT
Start: 2020-09-28 | End: 2020-11-03

## 2020-10-05 RX ORDER — ALBUTEROL SULFATE 90 UG/1
2 AEROSOL, METERED RESPIRATORY (INHALATION) EVERY 4 HOURS PRN
Qty: 18 G | Refills: 5 | Status: SHIPPED | OUTPATIENT
Start: 2020-10-05 | End: 2020-12-14 | Stop reason: SDUPTHER

## 2020-10-05 RX ORDER — ALBUTEROL SULFATE 90 UG/1
2 AEROSOL, METERED RESPIRATORY (INHALATION) EVERY 4 HOURS PRN
Qty: 18 G | Refills: 5 | Status: CANCELLED | OUTPATIENT
Start: 2020-10-05

## 2020-10-05 NOTE — TELEPHONE ENCOUNTER
PHARMACY CALLED REQUESTING A REFILL FOR:  VENTOLIN  (90 Base) MCG/ACT inhaler    [id number] [place] [address] [facility] - [phone]  - [phone] FX

## 2020-10-14 NOTE — PROGRESS NOTES
Patient: Gabi Dudley    YOB: 1945    Date: 10/16/2020    Primary Care Provider: Paul Quinteros MD    Chief Complaint   Patient presents with   • Establish Care   • Cholelithiasis     Patient states she has severe pain, and nausea,       SUBJECTIVE:    History of present illness:  Patient is in the office today for evaluation and consultation of right upper quadrant pain. Patient states  occasional right upper quadrant abdominal pain radiated to the back. Patient does have a history of GERD however it is stable with medication. Patient complains of severe right upper quadrant pain and nausea, for the last couple of months.  CT scan and ultrasound confirm multiple gallstones.  Patient has fatty food intolerance, pain right upper quadrant radiates to her back and is at times 9 out of 10 intensity.  The last 1-1/2 to 2 hours after eating.  No rectal bleeding or change in bowel habits.  Patient was cleared by cardiology 2 weeks ago for surgery.  Patient uses oxygen for COPD on a as needed basis only.    The following portions of the patient's history were reviewed and updated as appropriate: allergies, current medications, past family history, past medical history, past social history, past surgical history and problem list.      Review of Systems   Constitutional: Negative for chills, fever and unexpected weight change.   HENT: Negative for hearing loss, trouble swallowing and voice change.    Eyes: Negative for visual disturbance.   Respiratory: Negative for apnea, cough, chest tightness, shortness of breath and wheezing.    Cardiovascular: Negative for chest pain, palpitations and leg swelling.   Gastrointestinal: Positive for abdominal pain and nausea. Negative for abdominal distention, anal bleeding, blood in stool, constipation, diarrhea, rectal pain and vomiting.   Endocrine: Negative for cold intolerance and heat intolerance.   Genitourinary: Negative for difficulty urinating, dysuria and flank  pain.   Musculoskeletal: Negative for back pain and gait problem.   Skin: Negative for color change, rash and wound.   Neurological: Negative for dizziness, syncope, speech difficulty, weakness, light-headedness, numbness and headaches.   Hematological: Negative for adenopathy. Does not bruise/bleed easily.   Psychiatric/Behavioral: Negative for confusion. The patient is not nervous/anxious.        Allergies:  No Known Allergies    Medications:    Current Outpatient Medications:   •  albuterol sulfate HFA (Ventolin HFA) 108 (90 Base) MCG/ACT inhaler, Inhale 2 puffs Every 4 (Four) Hours As Needed for Wheezing., Disp: 18 g, Rfl: 5  •  apixaban (ELIQUIS) 5 MG tablet tablet, Take 5 mg by mouth 2 (Two) Times a Day., Disp: , Rfl:   •  atorvastatin (LIPITOR) 20 MG tablet, TAKE 1 TABLET BY MOUTH ONCE DAILY, Disp: 90 tablet, Rfl: 3  •  Azelastine HCl 137 MCG/SPRAY solution, 1 spray into the nostril(s) as directed by provider 2 (Two) Times a Day., Disp: 30 mL, Rfl: 1  •  clonazePAM (KlonoPIN) 1 MG tablet, TAKE 1 TABLET BY MOUTH EVERY EVENING AS NEEDED FOR ANXIETY, Disp: 30 tablet, Rfl: 0  •  DULoxetine (CYMBALTA) 60 MG capsule, TAKE ONE CAPSULE BY MOUTH TWICE DAILY, Disp: 180 capsule, Rfl: 1  •  HYDROcodone-acetaminophen (NORCO)  MG per tablet, take 1 tablet by mouth three times a day, Disp: , Rfl: 0  •  ipratropium-albuterol (DUO-NEB) 0.5-2.5 mg/3 ml nebulizer, Take 3 mL by nebulization Every 4 (Four) Hours As Needed for Wheezing., Disp: 360 mL, Rfl: 11  •  oxybutynin XL (DITROPAN-XL) 10 MG 24 hr tablet, TAKE 1 TABLET BY MOUTH DAILY, Disp: 90 tablet, Rfl: 1  •  pantoprazole (PROTONIX) 40 MG EC tablet, TAKE 1 TABLET BY MOUTH DAILY, Disp: 90 tablet, Rfl: 3  •  sertraline (ZOLOFT) 100 MG tablet, 1 and 1/2 tab daily po, Disp: 135 tablet, Rfl: 1  •  traZODone (DESYREL) 100 MG tablet, Take 1 tablet by mouth Every Evening., Disp: 60 tablet, Rfl: 5  •  budesonide-formoterol (Symbicort) 160-4.5 MCG/ACT inhaler, Inhale 2 puffs 2  (Two) Times a Day., Disp: 1 inhaler, Rfl: 12  •  rivaroxaban (Xarelto) 20 MG tablet, Take 1 tablet by mouth Daily., Disp: 30 tablet, Rfl: 6    History:  Past Medical History:   Diagnosis Date   • Adrenal adenoma    • Anemia    • Anxiety    • Arrhythmia    • Asthma    • Back pain    • Benign colonic polyp    • Cholelithiasis    • Chronic bronchitis (CMS/HCC)    • Chronic bronchitis with COPD (chronic obstructive pulmonary disease) (CMS/HCC)    • COPD (chronic obstructive pulmonary disease) (CMS/HCC) 2005   • Depression    • Environmental and seasonal allergies    • Fibromyalgia    • Fibromyalgia    • Gastritis    • Generalized anxiety disorder    • GERD (gastroesophageal reflux disease)    • H/O mammogram    • Hemorrhoids    • History of blood transfusion 1985   • History of blood transfusion 1985   • History of echocardiogram    • History of endometriosis    • History of nuclear stress test    • History of osteoarthritis    • Hypertension    • IBS (irritable bowel syndrome)    • Inverted nipple    • Kidney disease    • Kidney stone    • Liver cyst    • Nodular radiologic density    • Nodule of left lung    • On home oxygen therapy     2 liters NC QHS   • Osteoarthritis    • Osteoporosis    • PONV (postoperative nausea and vomiting)    • Renal cyst    • Sinus problem     2014   • Sinusitis    • Skin cancer     basal cell carcinoma   • SOB (shortness of breath)    • Tobacco use    • Vitamin D deficiency        Past Surgical History:   Procedure Laterality Date   • APPENDECTOMY  1980s   • CARDIAC CATHETERIZATION  1975   • CATARACT EXTRACTION  2013    both eyes   • COLONOSCOPY  03/11/2013   • COLONOSCOPY  06/21/2016   • COLONOSCOPY N/A 7/24/2019    Procedure: COLONOSCOPY W/ COLD FORCEP POLYPECTOMIES; HOT SNARE POLYPECTOMIES; COLD SNARE POLYPECTOMY;  Surgeon: Goyo Nunez MD;  Location: [facility] ENDOSCOPY;  Service: Gastroenterology   • COLONOSCOPY W/ BIOPSIES AND POLYPECTOMY     • HYSTERECTOMY  1980s    partial   •  "TONSILLECTOMY  1987   • UPPER GASTROINTESTINAL ENDOSCOPY  01/13/2015   • VAGINAL DELIVERY      x2       Family History   Problem Relation Age of Onset   • Stomach cancer Brother    • Colon cancer Maternal Aunt    • Brain cancer Father    • Arthritis Father    • Hypertension Father    • Lung cancer Paternal Grandfather    • Breast cancer Neg Hx    • Ovarian cancer Neg Hx        Social History     Tobacco Use   • Smoking status: Current Every Day Smoker     Packs/day: 0.50     Years: 30.00     Pack years: 15.00     Types: Cigarettes   • Smokeless tobacco: Never Used   • Tobacco comment: Uses Nicotine patches occasionally.    Substance Use Topics   • Alcohol use: No   • Drug use: No        OBJECTIVE:    Vital Signs:   Vitals:    10/16/20 1505   BP: 100/60   Pulse: 96   Temp: 97 °F (36.1 °C)   SpO2: 98%   Weight: 57.4 kg (126 lb 9.6 oz)   Height: 165.1 cm (65\")       Physical Exam:   General Appearance:    Alert, cooperative, in no acute distress   Head:    Normocephalic, without obvious abnormality, atraumatic   Eyes:            Lids and lashes normal, conjunctivae and sclerae normal, no   icterus, no pallor, corneas clear, PERRLA   Ears:    Ears appear intact with no abnormalities noted   Throat:   No oral lesions, no thrush, oral mucosa moist   Neck:   No adenopathy, supple, trachea midline, no thyromegaly, no   carotid bruit, no JVD   Lungs:     Clear to auscultation,respirations regular, even and                  unlabored    Heart:    Regular rhythm and normal rate, normal S1 and S2, no            murmur, no gallop, no rub, no click   Chest Wall:    No abnormalities observed   Abdomen:     Normal bowel sounds, no masses, no organomegaly, soft        non-tender, non-distended, no guarding, no rebound                tenderness   Extremities:   Moves all extremities well, no edema, no cyanosis, no             redness   Pulses:   Pulses palpable and equal bilaterally   Skin:   No bleeding, bruising or rash   Lymph " nodes:   No palpable adenopathy   Neurologic:   Cranial nerves 2 - 12 grossly intact, sensation intact, DTR       present and equal bilaterally     Results Review:   I reviewed the patient's new clinical results.    Review of Systems was reviewed and confirmed as accurate as documented by the MA.    ASSESSMENT/PLAN:    1. Pain of upper abdomen    2. Calculus of gallbladder without cholecystitis without obstruction    3. Pulmonary emphysema, unspecified emphysema type (CMS/HCC)        Patient scheduled for laparoscopic cholecystectomy, risk of bleeding infection and possible bowel and duct injury discussed with the laparoscopic approach and she is agreeable.  Risk of bile leak also discussed and patient agreeable.    I discussed the patients findings and my recommendations with patient        Electronically signed by Sima Moses MD  10/16/20

## 2020-10-16 ENCOUNTER — OFFICE VISIT (OUTPATIENT)
Dept: SURGERY | Facility: CLINIC | Age: 75
End: 2020-10-16

## 2020-10-16 VITALS
TEMPERATURE: 97 F | WEIGHT: 126.6 LBS | HEIGHT: 65 IN | DIASTOLIC BLOOD PRESSURE: 60 MMHG | SYSTOLIC BLOOD PRESSURE: 100 MMHG | OXYGEN SATURATION: 98 % | BODY MASS INDEX: 21.09 KG/M2 | HEART RATE: 96 BPM

## 2020-10-16 DIAGNOSIS — K80.20 CALCULUS OF GALLBLADDER WITHOUT CHOLECYSTITIS WITHOUT OBSTRUCTION: ICD-10-CM

## 2020-10-16 DIAGNOSIS — J43.9 PULMONARY EMPHYSEMA, UNSPECIFIED EMPHYSEMA TYPE (HCC): ICD-10-CM

## 2020-10-16 DIAGNOSIS — Z01.818 PRE-OP TESTING: Primary | ICD-10-CM

## 2020-10-16 DIAGNOSIS — R10.10 PAIN OF UPPER ABDOMEN: Primary | ICD-10-CM

## 2020-10-16 PROCEDURE — 99203 OFFICE O/P NEW LOW 30 MIN: CPT | Performed by: SURGERY

## 2020-10-16 RX ORDER — SODIUM CHLORIDE 9 MG/ML
100 INJECTION, SOLUTION INTRAVENOUS CONTINUOUS
Status: CANCELLED | OUTPATIENT
Start: 2020-10-30

## 2020-10-27 ENCOUNTER — LAB (OUTPATIENT)
Dept: LAB | Facility: HOSPITAL | Age: 75
End: 2020-10-27

## 2020-10-27 ENCOUNTER — TELEPHONE (OUTPATIENT)
Dept: CARDIOLOGY | Facility: CLINIC | Age: 75
End: 2020-10-27

## 2020-10-27 ENCOUNTER — APPOINTMENT (OUTPATIENT)
Dept: PREADMISSION TESTING | Facility: HOSPITAL | Age: 75
End: 2020-10-27

## 2020-10-27 ENCOUNTER — HOSPITAL ENCOUNTER (OUTPATIENT)
Dept: GENERAL RADIOLOGY | Facility: HOSPITAL | Age: 75
Discharge: HOME | End: 2020-10-27

## 2020-10-27 VITALS
DIASTOLIC BLOOD PRESSURE: 68 MMHG | HEIGHT: 65 IN | WEIGHT: 129.8 LBS | HEART RATE: 95 BPM | BODY MASS INDEX: 21.63 KG/M2 | OXYGEN SATURATION: 95 % | SYSTOLIC BLOOD PRESSURE: 99 MMHG

## 2020-10-27 DIAGNOSIS — Z01.818 PRE-OP TESTING: Primary | ICD-10-CM

## 2020-10-27 LAB
ANION GAP SERPL CALCULATED.3IONS-SCNC: 7.5 MMOL/L (ref 5–15)
BUN SERPL-MCNC: 27 MG/DL (ref 8–23)
BUN/CREAT SERPL: 37 (ref 7–25)
CALCIUM SPEC-SCNC: 9.2 MG/DL (ref 8.6–10.5)
CHLORIDE SERPL-SCNC: 99 MMOL/L (ref 98–107)
CO2 SERPL-SCNC: 31.5 MMOL/L (ref 22–29)
CREAT SERPL-MCNC: 0.73 MG/DL (ref 0.57–1)
DEPRECATED RDW RBC AUTO: 45.3 FL (ref 37–54)
ERYTHROCYTE [DISTWIDTH] IN BLOOD BY AUTOMATED COUNT: 14.3 % (ref 12.3–15.4)
GFR SERPL CREATININE-BSD FRML MDRD: 78 ML/MIN/1.73
GLUCOSE SERPL-MCNC: 95 MG/DL (ref 65–99)
HCT VFR BLD AUTO: 36.9 % (ref 34–46.6)
HGB BLD-MCNC: 11.4 G/DL (ref 12–15.9)
MCH RBC QN AUTO: 26.8 PG (ref 26.6–33)
MCHC RBC AUTO-ENTMCNC: 30.9 G/DL (ref 31.5–35.7)
MCV RBC AUTO: 86.8 FL (ref 79–97)
PLATELET # BLD AUTO: 218 10*3/MM3 (ref 140–450)
PMV BLD AUTO: 10.3 FL (ref 6–12)
POTASSIUM SERPL-SCNC: 4.5 MMOL/L (ref 3.5–5.2)
QT INTERVAL: 424 MS
QTC INTERVAL: 486 MS
RBC # BLD AUTO: 4.25 10*6/MM3 (ref 3.77–5.28)
SODIUM SERPL-SCNC: 138 MMOL/L (ref 136–145)
WBC # BLD AUTO: 6.18 10*3/MM3 (ref 3.4–10.8)

## 2020-10-27 PROCEDURE — 80048 BASIC METABOLIC PNL TOTAL CA: CPT

## 2020-10-27 PROCEDURE — 71045 X-RAY EXAM CHEST 1 VIEW: CPT

## 2020-10-27 PROCEDURE — 36415 COLL VENOUS BLD VENIPUNCTURE: CPT

## 2020-10-27 PROCEDURE — C9803 HOPD COVID-19 SPEC COLLECT: HCPCS

## 2020-10-27 PROCEDURE — 93005 ELECTROCARDIOGRAM TRACING: CPT

## 2020-10-27 PROCEDURE — 85027 COMPLETE CBC AUTOMATED: CPT

## 2020-10-27 PROCEDURE — U0004 COV-19 TEST NON-CDC HGH THRU: HCPCS

## 2020-10-27 NOTE — TELEPHONE ENCOUNTER
Pt. Is to have gallbladder surgery on Friday 10-30 w/ Dr. Moses , she is on Xarelto.  Can she HOLD Xarelto? Does she need anything further with us?

## 2020-10-28 LAB
25(OH)D3+25(OH)D2 SERPL-MCNC: 43.5 NG/ML (ref 30–100)
ALBUMIN SERPL-MCNC: 4.3 G/DL (ref 3.5–5.2)
ALBUMIN/GLOB SERPL: 2.3 G/DL
ALP SERPL-CCNC: 71 U/L (ref 39–117)
ALT SERPL-CCNC: 14 U/L (ref 1–33)
AST SERPL-CCNC: 20 U/L (ref 1–32)
BASOPHILS # BLD AUTO: 0.06 10*3/MM3 (ref 0–0.2)
BASOPHILS NFR BLD AUTO: 0.9 % (ref 0–1.5)
BILIRUB SERPL-MCNC: 0.2 MG/DL (ref 0–1.2)
BUN SERPL-MCNC: 26 MG/DL (ref 8–23)
BUN/CREAT SERPL: 37.7 (ref 7–25)
CALCIUM SERPL-MCNC: 8.7 MG/DL (ref 8.6–10.5)
CHLORIDE SERPL-SCNC: 100 MMOL/L (ref 98–107)
CHOLEST SERPL-MCNC: 149 MG/DL (ref 0–200)
CK SERPL-CCNC: 121 U/L (ref 20–180)
CO2 SERPL-SCNC: 32.4 MMOL/L (ref 22–29)
CREAT SERPL-MCNC: 0.69 MG/DL (ref 0.57–1)
EOSINOPHIL # BLD AUTO: 0.1 10*3/MM3 (ref 0–0.4)
EOSINOPHIL NFR BLD AUTO: 1.5 % (ref 0.3–6.2)
ERYTHROCYTE [DISTWIDTH] IN BLOOD BY AUTOMATED COUNT: 13.7 % (ref 12.3–15.4)
GLOBULIN SER CALC-MCNC: 1.9 GM/DL
GLUCOSE SERPL-MCNC: 88 MG/DL (ref 65–99)
HCT VFR BLD AUTO: 35.4 % (ref 34–46.6)
HDLC SERPL-MCNC: 72 MG/DL (ref 40–60)
HGB BLD-MCNC: 11.1 G/DL (ref 12–15.9)
IMM GRANULOCYTES # BLD AUTO: 0.01 10*3/MM3 (ref 0–0.05)
IMM GRANULOCYTES NFR BLD AUTO: 0.2 % (ref 0–0.5)
LDLC SERPL CALC-MCNC: 63 MG/DL (ref 0–100)
LYMPHOCYTES # BLD AUTO: 1.91 10*3/MM3 (ref 0.7–3.1)
LYMPHOCYTES NFR BLD AUTO: 29.4 % (ref 19.6–45.3)
MCH RBC QN AUTO: 26.6 PG (ref 26.6–33)
MCHC RBC AUTO-ENTMCNC: 31.4 G/DL (ref 31.5–35.7)
MCV RBC AUTO: 84.7 FL (ref 79–97)
MONOCYTES # BLD AUTO: 0.45 10*3/MM3 (ref 0.1–0.9)
MONOCYTES NFR BLD AUTO: 6.9 % (ref 5–12)
NEUTROPHILS # BLD AUTO: 3.96 10*3/MM3 (ref 1.7–7)
NEUTROPHILS NFR BLD AUTO: 61.1 % (ref 42.7–76)
NRBC BLD AUTO-RTO: 0 /100 WBC (ref 0–0.2)
PLATELET # BLD AUTO: 228 10*3/MM3 (ref 140–450)
POTASSIUM SERPL-SCNC: 4.4 MMOL/L (ref 3.5–5.2)
PROT SERPL-MCNC: 6.2 G/DL (ref 6–8.5)
RBC # BLD AUTO: 4.18 10*6/MM3 (ref 3.77–5.28)
SARS-COV-2 RNA RESP QL NAA+PROBE: NOT DETECTED
SODIUM SERPL-SCNC: 140 MMOL/L (ref 136–145)
TRIGL SERPL-MCNC: 68 MG/DL (ref 0–150)
TSH SERPL DL<=0.005 MIU/L-ACNC: 2.91 UIU/ML (ref 0.27–4.2)
VIT B12 SERPL-MCNC: 608 PG/ML (ref 211–946)
VLDLC SERPL CALC-MCNC: 14 MG/DL (ref 5–40)
WBC # BLD AUTO: 6.49 10*3/MM3 (ref 3.4–10.8)

## 2020-10-30 ENCOUNTER — ANESTHESIA (OUTPATIENT)
Dept: PERIOP | Facility: HOSPITAL | Age: 75
End: 2020-10-30

## 2020-10-30 ENCOUNTER — ANESTHESIA EVENT (OUTPATIENT)
Dept: PERIOP | Facility: HOSPITAL | Age: 75
End: 2020-10-30

## 2020-10-30 ENCOUNTER — APPOINTMENT (OUTPATIENT)
Dept: GENERAL RADIOLOGY | Facility: HOSPITAL | Age: 75
End: 2020-10-30

## 2020-10-30 ENCOUNTER — HOSPITAL ENCOUNTER (OUTPATIENT)
Facility: HOSPITAL | Age: 75
Setting detail: HOSPITAL OUTPATIENT SURGERY
Discharge: HOME | End: 2020-10-30
Attending: SURGERY | Admitting: SURGERY

## 2020-10-30 VITALS
DIASTOLIC BLOOD PRESSURE: 61 MMHG | OXYGEN SATURATION: 91 % | HEART RATE: 73 BPM | RESPIRATION RATE: 18 BRPM | TEMPERATURE: 99 F | SYSTOLIC BLOOD PRESSURE: 103 MMHG

## 2020-10-30 DIAGNOSIS — R10.10 PAIN OF UPPER ABDOMEN: ICD-10-CM

## 2020-10-30 DIAGNOSIS — K80.20 CALCULUS OF GALLBLADDER WITHOUT CHOLECYSTITIS WITHOUT OBSTRUCTION: ICD-10-CM

## 2020-10-30 DIAGNOSIS — J43.9 PULMONARY EMPHYSEMA, UNSPECIFIED EMPHYSEMA TYPE (HCC): ICD-10-CM

## 2020-10-30 PROCEDURE — 25010000002 PROPOFOL 200 MG/20ML EMULSION: Performed by: NURSE ANESTHETIST, CERTIFIED REGISTERED

## 2020-10-30 PROCEDURE — 94640 AIRWAY INHALATION TREATMENT: CPT

## 2020-10-30 PROCEDURE — 25010000002 ONDANSETRON PER 1 MG: Performed by: NURSE ANESTHETIST, CERTIFIED REGISTERED

## 2020-10-30 PROCEDURE — 74300 X-RAY BILE DUCTS/PANCREAS: CPT

## 2020-10-30 PROCEDURE — 25010000002 FENTANYL CITRATE (PF) 100 MCG/2ML SOLUTION: Performed by: NURSE ANESTHETIST, CERTIFIED REGISTERED

## 2020-10-30 PROCEDURE — 25010000002 DEXAMETHASONE PER 1 MG: Performed by: NURSE ANESTHETIST, CERTIFIED REGISTERED

## 2020-10-30 PROCEDURE — 47563 LAPARO CHOLECYSTECTOMY/GRAPH: CPT | Performed by: SURGERY

## 2020-10-30 PROCEDURE — 88304 TISSUE EXAM BY PATHOLOGIST: CPT | Performed by: SURGERY

## 2020-10-30 PROCEDURE — 25010000002 HYDROMORPHONE 1 MG/ML SOLUTION

## 2020-10-30 PROCEDURE — 25010000002 IOPAMIDOL 61 % SOLUTION: Performed by: SURGERY

## 2020-10-30 PROCEDURE — 25010000003 AMPICILLIN-SULBACTAM PER 1.5 G: Performed by: SURGERY

## 2020-10-30 PROCEDURE — 25010000002 MIDAZOLAM PER 1MG: Performed by: NURSE ANESTHETIST, CERTIFIED REGISTERED

## 2020-10-30 DEVICE — LIGACLIP 10-M/L, 10MM ENDOSCOPIC ROTATING MULTIPLE CLIP APPLIERS
Type: IMPLANTABLE DEVICE | Site: ABDOMEN | Status: FUNCTIONAL
Brand: LIGACLIP

## 2020-10-30 RX ORDER — BUPIVACAINE HYDROCHLORIDE 5 MG/ML
INJECTION, SOLUTION EPIDURAL; INTRACAUDAL AS NEEDED
Status: DISCONTINUED | OUTPATIENT
Start: 2020-10-30 | End: 2020-10-30 | Stop reason: HOSPADM

## 2020-10-30 RX ORDER — ONDANSETRON 2 MG/ML
INJECTION INTRAMUSCULAR; INTRAVENOUS AS NEEDED
Status: DISCONTINUED | OUTPATIENT
Start: 2020-10-30 | End: 2020-10-30 | Stop reason: SURG

## 2020-10-30 RX ORDER — ROCURONIUM BROMIDE 10 MG/ML
INJECTION, SOLUTION INTRAVENOUS AS NEEDED
Status: DISCONTINUED | OUTPATIENT
Start: 2020-10-30 | End: 2020-10-30 | Stop reason: SURG

## 2020-10-30 RX ORDER — MAGNESIUM HYDROXIDE 1200 MG/15ML
LIQUID ORAL AS NEEDED
Status: DISCONTINUED | OUTPATIENT
Start: 2020-10-30 | End: 2020-10-30 | Stop reason: HOSPADM

## 2020-10-30 RX ORDER — PROPOFOL 10 MG/ML
INJECTION, EMULSION INTRAVENOUS AS NEEDED
Status: DISCONTINUED | OUTPATIENT
Start: 2020-10-30 | End: 2020-10-30 | Stop reason: SURG

## 2020-10-30 RX ORDER — HYDROCODONE BITARTRATE AND ACETAMINOPHEN 5; 325 MG/1; MG/1
1-2 TABLET ORAL EVERY 4 HOURS PRN
Qty: 14 TABLET | Refills: 0 | Status: SHIPPED | OUTPATIENT
Start: 2020-10-30 | End: 2020-11-28 | Stop reason: HOSPADM

## 2020-10-30 RX ORDER — FENTANYL CITRATE 50 UG/ML
INJECTION, SOLUTION INTRAMUSCULAR; INTRAVENOUS AS NEEDED
Status: DISCONTINUED | OUTPATIENT
Start: 2020-10-30 | End: 2020-10-30 | Stop reason: SURG

## 2020-10-30 RX ORDER — LIDOCAINE HYDROCHLORIDE 20 MG/ML
INJECTION, SOLUTION INTRAVENOUS AS NEEDED
Status: DISCONTINUED | OUTPATIENT
Start: 2020-10-30 | End: 2020-10-30 | Stop reason: SURG

## 2020-10-30 RX ORDER — ONDANSETRON 2 MG/ML
4 INJECTION INTRAMUSCULAR; INTRAVENOUS ONCE AS NEEDED
Status: DISCONTINUED | OUTPATIENT
Start: 2020-10-30 | End: 2020-10-30 | Stop reason: HOSPADM

## 2020-10-30 RX ORDER — DEXAMETHASONE SODIUM PHOSPHATE 4 MG/ML
INJECTION, SOLUTION INTRA-ARTICULAR; INTRALESIONAL; INTRAMUSCULAR; INTRAVENOUS; SOFT TISSUE AS NEEDED
Status: DISCONTINUED | OUTPATIENT
Start: 2020-10-30 | End: 2020-10-30 | Stop reason: SURG

## 2020-10-30 RX ORDER — SODIUM CHLORIDE 9 MG/ML
100 INJECTION, SOLUTION INTRAVENOUS CONTINUOUS
Status: DISCONTINUED | OUTPATIENT
Start: 2020-10-30 | End: 2020-10-30 | Stop reason: HOSPADM

## 2020-10-30 RX ORDER — MIDAZOLAM HYDROCHLORIDE 2 MG/2ML
1 INJECTION, SOLUTION INTRAMUSCULAR; INTRAVENOUS
Status: DISCONTINUED | OUTPATIENT
Start: 2020-10-30 | End: 2020-10-30 | Stop reason: HOSPADM

## 2020-10-30 RX ORDER — IPRATROPIUM BROMIDE AND ALBUTEROL SULFATE 2.5; .5 MG/3ML; MG/3ML
3 SOLUTION RESPIRATORY (INHALATION) ONCE
Status: COMPLETED | OUTPATIENT
Start: 2020-10-30 | End: 2020-10-30

## 2020-10-30 RX ADMIN — FENTANYL CITRATE 100 MCG: 50 INJECTION INTRAMUSCULAR; INTRAVENOUS at 08:51

## 2020-10-30 RX ADMIN — ONDANSETRON 4 MG: 2 INJECTION INTRAMUSCULAR; INTRAVENOUS at 08:43

## 2020-10-30 RX ADMIN — Medication 0.5 MG: at 09:47

## 2020-10-30 RX ADMIN — LIDOCAINE HYDROCHLORIDE 60 MG: 20 INJECTION, SOLUTION INTRAVENOUS at 08:31

## 2020-10-30 RX ADMIN — FENTANYL CITRATE 100 MCG: 50 INJECTION INTRAMUSCULAR; INTRAVENOUS at 08:31

## 2020-10-30 RX ADMIN — HYDROMORPHONE HYDROCHLORIDE 0.5 MG: 1 INJECTION, SOLUTION INTRAMUSCULAR; INTRAVENOUS; SUBCUTANEOUS at 10:03

## 2020-10-30 RX ADMIN — DEXAMETHASONE SODIUM PHOSPHATE 8 MG: 4 INJECTION, SOLUTION INTRAMUSCULAR; INTRAVENOUS at 08:43

## 2020-10-30 RX ADMIN — ROCURONIUM BROMIDE 30 MG: 10 INJECTION INTRAVENOUS at 08:34

## 2020-10-30 RX ADMIN — IPRATROPIUM BROMIDE AND ALBUTEROL SULFATE 3 ML: .5; 3 SOLUTION RESPIRATORY (INHALATION) at 07:39

## 2020-10-30 RX ADMIN — SODIUM CHLORIDE 100 ML/HR: 9 INJECTION, SOLUTION INTRAVENOUS at 07:32

## 2020-10-30 RX ADMIN — SODIUM CHLORIDE 3 G: 900 INJECTION INTRAVENOUS at 08:32

## 2020-10-30 RX ADMIN — HYDROMORPHONE HYDROCHLORIDE 0.5 MG: 1 INJECTION, SOLUTION INTRAMUSCULAR; INTRAVENOUS; SUBCUTANEOUS at 09:47

## 2020-10-30 RX ADMIN — MIDAZOLAM HYDROCHLORIDE 1 MG: 1 INJECTION, SOLUTION INTRAMUSCULAR; INTRAVENOUS at 07:51

## 2020-10-30 RX ADMIN — Medication 0.5 MG: at 10:03

## 2020-10-30 RX ADMIN — PROPOFOL 150 MG: 10 INJECTION, EMULSION INTRAVENOUS at 08:33

## 2020-10-30 NOTE — ANESTHESIA PROCEDURE NOTES
Airway  Urgency: elective    Date/Time: 10/30/2020 8:33 AM  Airway not difficult    General Information and Staff    Patient location during procedure: OR  CRNA: Duran Vogel CRNA    Indications and Patient Condition  Indications for airway management: airway protection    Preoxygenated: yes      Final Airway Details  Final airway type: endotracheal airway      Successful airway: ETT  Cuffed: yes   Successful intubation technique: direct laryngoscopy  Endotracheal tube insertion site: oral  Blade: Roland  Blade size: 2  ETT size (mm): 7.5  Cormack-Lehane Classification: grade I - full view of glottis  Placement verified by: chest auscultation and capnometry   Number of attempts at approach: 1

## 2020-10-30 NOTE — ANESTHESIA PREPROCEDURE EVALUATION
Anesthesia Evaluation     Patient summary reviewed and Nursing notes reviewed   history of anesthetic complications:  NPO Solid Status: > 8 hours  NPO Liquid Status: > 8 hours           Airway   Mallampati: II  TM distance: >3 FB  Neck ROM: full  No difficulty expected  Dental - normal exam     Pulmonary - normal exam   (+) COPD moderate, asthma,home oxygen, shortness of breath,   Cardiovascular - normal exam    ECG reviewed  Rhythm: regular  Rate: normal    (+) hypertension, CAD, dysrhythmias PAC, pericardial effusion, hyperlipidemia,       Neuro/Psych  (+) headaches, psychiatric history Anxiety and Depression,     GI/Hepatic/Renal/Endo    (+)  GERD,  liver disease, renal disease stones,     Musculoskeletal     (+) back pain,   Abdominal    Substance History      OB/GYN          Other   arthritis,    history of cancer    ROS/Med Hx Other: 1.  Normal left ventricular size with low normal LV systolic function, LVEF 50-55%.  2.  Mild concentric LVH.  3.  Normal LV diastolic filling pattern.  4.  Mild right ventricular dilation with normal RV systolic function.  5.  Normal left and right atrial size.  6.  Trace MR and TR.                Anesthesia Plan    ASA 4     general   (Risks and benefits discussed including risk of aspiration, recall and dental damage. All patient questions answered.    Patient told that a breathing tube will be used to manage the airway.    Will continue with plan of care.)  intravenous induction     Anesthetic plan, all risks, benefits, and alternatives have been provided, discussed and informed consent has been obtained with: patient.    Plan discussed with CRNA.

## 2020-10-30 NOTE — ANESTHESIA POSTPROCEDURE EVALUATION
Patient: Gabi Dudley    Procedure Summary     Date: 10/30/20 Room / Location: [facility] OR  /  [facility]    Anesthesia Start: 0830 Anesthesia Stop: 0930    Procedure: CHOLECYSTECTOMY LAPAROSCOPIC INTRAOPERATIVE CHOLANGIOGRAPHY (N/A Abdomen) Diagnosis:       Pain of upper abdomen      Calculus of gallbladder without cholecystitis without obstruction      Pulmonary emphysema, unspecified emphysema type (CMS/HCC)      (Pain of upper abdomen [R10.10])      (Calculus of gallbladder without cholecystitis without obstruction [K80.20])      (Pulmonary emphysema, unspecified emphysema type (CMS/HCC) [J43.9])    Surgeon: Sima Moses MD Provider: Duran oVgel CRNA    Anesthesia Type: general ASA Status: 4          Anesthesia Type: general    Vitals  Vitals Value Taken Time   /61 10/30/20 0929   Temp     Pulse 73 10/30/20 0930   Resp     SpO2 100 % 10/30/20 0930   Vitals shown include unvalidated device data.        Post Anesthesia Care and Evaluation    Patient location during evaluation: PACU  Patient participation: complete - patient participated  Level of consciousness: awake and alert and sleepy but conscious  Pain score: 2  Pain management: adequate  Airway patency: patent  Anesthetic complications: No anesthetic complications  PONV Status: none  Cardiovascular status: acceptable  Respiratory status: acceptable and face mask  Hydration status: acceptable

## 2020-11-02 LAB
LAB AP CASE REPORT: NORMAL
PATH REPORT.FINAL DX SPEC: NORMAL

## 2020-11-03 RX ORDER — CLONAZEPAM 1 MG/1
TABLET ORAL
Qty: 30 TABLET | Refills: 0 | Status: SHIPPED | OUTPATIENT
Start: 2020-11-03 | End: 2020-12-14 | Stop reason: SDUPTHER

## 2020-11-05 NOTE — PROGRESS NOTES
"Patient: Gabi Dudley    YOB: 1945    Date: 11/06/2020    Primary Care Provider: Paul Quinteros MD    Chief Complaint   Patient presents with   • Post-op     lap mamta       History of present illness:  I saw the patient in the office today as a followup from their recent laparoscopic cholecystectomy. The pathology report did show mild chronic cholecystitis. They state that they have done well and are having no complaints.    Vital Signs:   Vitals:    11/06/20 1332   BP: 90/62   Pulse: 98   Temp: 98.6 °F (37 °C)   SpO2: 99%   Weight: 58.5 kg (129 lb)   Height: 165.1 cm (65\")       Physical Exam:   General Appearance:    Alert, cooperative, in no acute distress   Abdomen:     no masses, no organomegaly, soft non-tender, non-distended, no guarding, wounds are well healed     Assessment / Plan :    1. Postoperative visit        I did discuss the situation with the patient today in the office and they have done well from their recent laparoscopic cholecystectomy.  I have released the patient back to normal activity, they understand that they need to be careful about heavy lifting.  I need to see the patient back in the office only if they are having further problems, they know to call me if they are.    Electronically signed by Sima Moses MD  11/06/20                  "

## 2020-11-06 ENCOUNTER — OFFICE VISIT (OUTPATIENT)
Dept: SURGERY | Facility: CLINIC | Age: 75
End: 2020-11-06

## 2020-11-06 VITALS
DIASTOLIC BLOOD PRESSURE: 62 MMHG | TEMPERATURE: 98.6 F | OXYGEN SATURATION: 99 % | WEIGHT: 129 LBS | HEART RATE: 98 BPM | HEIGHT: 65 IN | BODY MASS INDEX: 21.49 KG/M2 | SYSTOLIC BLOOD PRESSURE: 90 MMHG

## 2020-11-06 DIAGNOSIS — Z48.89 POSTOPERATIVE VISIT: Primary | ICD-10-CM

## 2020-11-06 PROCEDURE — 99024 POSTOP FOLLOW-UP VISIT: CPT | Performed by: SURGERY

## 2020-11-09 ENCOUNTER — OFFICE VISIT (OUTPATIENT)
Dept: INTERNAL MEDICINE | Facility: CLINIC | Age: 75
End: 2020-11-09

## 2020-11-09 VITALS
OXYGEN SATURATION: 94 % | HEIGHT: 65 IN | TEMPERATURE: 96.9 F | WEIGHT: 126.8 LBS | DIASTOLIC BLOOD PRESSURE: 66 MMHG | BODY MASS INDEX: 21.13 KG/M2 | HEART RATE: 91 BPM | SYSTOLIC BLOOD PRESSURE: 114 MMHG

## 2020-11-09 DIAGNOSIS — J43.9 PULMONARY EMPHYSEMA, UNSPECIFIED EMPHYSEMA TYPE (HCC): ICD-10-CM

## 2020-11-09 DIAGNOSIS — D64.9 ANEMIA, UNSPECIFIED TYPE: ICD-10-CM

## 2020-11-09 DIAGNOSIS — E78.5 HYPERLIPIDEMIA, UNSPECIFIED HYPERLIPIDEMIA TYPE: Primary | ICD-10-CM

## 2020-11-09 DIAGNOSIS — I10 ESSENTIAL HYPERTENSION: ICD-10-CM

## 2020-11-09 DIAGNOSIS — F41.9 ANXIETY: ICD-10-CM

## 2020-11-09 DIAGNOSIS — I48.0 PAROXYSMAL ATRIAL FIBRILLATION (HCC): ICD-10-CM

## 2020-11-09 PROCEDURE — 99214 OFFICE O/P EST MOD 30 MIN: CPT | Performed by: INTERNAL MEDICINE

## 2020-11-09 NOTE — PROGRESS NOTES
Subjective   Gabi Dudley is a 75 y.o. female.     Chief Complaint   Patient presents with   • Labs Only     2 mo f/u        History of Present Illness   Patient here for follow-up of.  October 30 patient had a gallbladder removed surgery.  Patient is doing well still some incision pain.  No diarrhea no fever no nausea vomiting patient has a good appetite.  Weight is stable.  Hypertension stable on medication.  Hyperlipidemia stable on medication.  Atrial fibrillation stable on medication.  Emphysema patient still on nighttime oxygen.  Anxiety stable on medication.  Hemoglobin decreased.also has sinus congestion and postnasal drainage. mucinex helps some.     Current Outpatient Medications:   •  albuterol sulfate HFA (Ventolin HFA) 108 (90 Base) MCG/ACT inhaler, Inhale 2 puffs Every 4 (Four) Hours As Needed for Wheezing., Disp: 18 g, Rfl: 5  •  apixaban (ELIQUIS) 5 MG tablet tablet, Take 5 mg by mouth 2 (Two) Times a Day., Disp: , Rfl:   •  atorvastatin (LIPITOR) 20 MG tablet, TAKE 1 TABLET BY MOUTH ONCE DAILY, Disp: 90 tablet, Rfl: 3  •  Azelastine HCl 137 MCG/SPRAY solution, 1 spray into the nostril(s) as directed by provider 2 (Two) Times a Day., Disp: 30 mL, Rfl: 1  •  Cholecalciferol (vitamin D3) 125 MCG (5000 UT) capsule capsule, Take 5,000 Units by mouth Daily., Disp: , Rfl:   •  clonazePAM (KlonoPIN) 1 MG tablet, TAKE 1 TABLET BY MOUTH EVERY EVENING AS NEEDED FOR ANXIETY, Disp: 30 tablet, Rfl: 0  •  DULoxetine (CYMBALTA) 60 MG capsule, TAKE ONE CAPSULE BY MOUTH TWICE DAILY, Disp: 180 capsule, Rfl: 1  •  HYDROcodone-acetaminophen (NORCO)  MG per tablet, take 1 tablet by mouth three times a day, Disp: , Rfl: 0  •  HYDROcodone-acetaminophen (NORCO) 5-325 MG per tablet, Take 1-2 tablets by mouth Every 4 (Four) Hours As Needed for pain, Disp: 14 tablet, Rfl: 0  •  ipratropium-albuterol (DUO-NEB) 0.5-2.5 mg/3 ml nebulizer, Take 3 mL by nebulization Every 4 (Four) Hours As Needed for Wheezing., Disp: 360  mL, Rfl: 11  •  MAGNESIUM-ZINC PO, Take 1 tablet by mouth Daily., Disp: , Rfl:   •  oxybutynin XL (DITROPAN-XL) 10 MG 24 hr tablet, TAKE 1 TABLET BY MOUTH DAILY, Disp: 90 tablet, Rfl: 1  •  pantoprazole (PROTONIX) 40 MG EC tablet, TAKE 1 TABLET BY MOUTH DAILY, Disp: 90 tablet, Rfl: 3  •  rivaroxaban (Xarelto) 20 MG tablet, Take 1 tablet by mouth Daily., Disp: 30 tablet, Rfl: 6  •  sertraline (ZOLOFT) 100 MG tablet, 1 and 1/2 tab daily po, Disp: 135 tablet, Rfl: 1  •  traZODone (DESYREL) 100 MG tablet, Take 1 tablet by mouth Every Evening., Disp: 60 tablet, Rfl: 5  •  budesonide-formoterol (Symbicort) 160-4.5 MCG/ACT inhaler, Inhale 2 puffs 2 (Two) Times a Day., Disp: 1 inhaler, Rfl: 12    The following portions of the patient's history were reviewed and updated as appropriate: allergies, current medications, past family history, past medical history, past social history, past surgical history and problem list.    Review of Systems   Constitutional: Negative.    Respiratory: Negative.    Cardiovascular: Negative.    Gastrointestinal: Negative.    Musculoskeletal: Negative.    Skin: Negative.    Neurological: Negative.    Psychiatric/Behavioral: Negative.        Objective   Physical Exam  Neck:      Musculoskeletal: Neck supple.   Cardiovascular:      Rate and Rhythm: Normal rate and regular rhythm.      Heart sounds: Normal heart sounds.   Pulmonary:      Effort: Pulmonary effort is normal.      Breath sounds: Normal breath sounds.   Abdominal:      General: Bowel sounds are normal.   Skin:     General: Skin is warm.   Neurological:      Mental Status: She is alert and oriented to person, place, and time.         All tests have been reviewed.    Assessment/Plan   Diagnoses and all orders for this visit:    Hyperlipidemia, unspecified hyperlipidemia type continue medication    Essential hypertension continue medication    Paroxysmal atrial fibrillation (CMS/HCC) continue medication    Pulmonary emphysema, unspecified  emphysema type (CMS/HCC) continue oxygen    Anxiety continue medication    Sinusitis, cont mucinex zyrtec and tylenol     Anemia, unspecified type repeat blood test  -     CBC & Differential    1 month follow-up                                  ----historical data below  S/p cholecystectomy   tob, CT lung 8/2020, lung nodule, scarring and emphysema.   anxiety continue zoloft 150 mg daily.   and klonopin 1mg, 1/2 tab po prn , again emphasized as needed basis.  Continue cymbalta 60 bid.  Patient refused to take BuSpar or see psychiatrist.  CT scan showed liver cyst, large kidney cyst, adrenal gland adenoma, kidney stones non-obstructing. repeat showed no change 7/2020,benign per radiologist  right knee OA on xray aleve prn knee brace help  hip pain s/p steroid helped improved  Osteoporosis continue medication, refuse dexa   heme+, Colon polyps repeat 6/2019. EGD done  COPD continue medication. need to Quit tobacco. tudorsa, proair, continue O2 prn, symbicort refill nebulizer  Fibromyalgia stable watch continue medicine and follow up with pain clinic,with lortab  Tension HA continue flexeril and naproxen  sinus polyp per dentist, obtain rec.  ref to ENT.patient refuses, azelastine and Claritin D 5 mg in the morning  Nipple retraction mamm and US:negative pathology  Cysts in liver and kidney, watch  Kidney lesion renal image CT renal protocol cyst and stone non obstructing  A.fib and pericardia eff, follow cardio patient to schedule  Liver cyst on CT watch  Right neck SK watch for now, patient to call if increase in size or change in color  Colon 7/24/19, repeat in 3 years

## 2020-11-23 ENCOUNTER — ANESTHESIA (OUTPATIENT)
Dept: PERIOP | Facility: HOSPITAL | Age: 75
End: 2020-11-23

## 2020-11-23 ENCOUNTER — APPOINTMENT (OUTPATIENT)
Dept: CT IMAGING | Facility: HOSPITAL | Age: 75
End: 2020-11-23

## 2020-11-23 ENCOUNTER — ANESTHESIA EVENT (OUTPATIENT)
Dept: PERIOP | Facility: HOSPITAL | Age: 75
End: 2020-11-23

## 2020-11-23 ENCOUNTER — OFFICE VISIT (OUTPATIENT)
Dept: SURGERY | Facility: CLINIC | Age: 75
End: 2020-11-23

## 2020-11-23 ENCOUNTER — APPOINTMENT (OUTPATIENT)
Dept: ULTRASOUND IMAGING | Facility: HOSPITAL | Age: 75
End: 2020-11-23

## 2020-11-23 ENCOUNTER — HOSPITAL ENCOUNTER (INPATIENT)
Facility: HOSPITAL | Age: 75
LOS: 5 days | Discharge: HOME | End: 2020-11-28
Attending: EMERGENCY MEDICINE | Admitting: SURGERY

## 2020-11-23 VITALS
HEIGHT: 65 IN | TEMPERATURE: 98.8 F | OXYGEN SATURATION: 98 % | WEIGHT: 126 LBS | SYSTOLIC BLOOD PRESSURE: 136 MMHG | HEART RATE: 81 BPM | DIASTOLIC BLOOD PRESSURE: 84 MMHG | BODY MASS INDEX: 20.99 KG/M2

## 2020-11-23 DIAGNOSIS — R10.84 GENERALIZED ABDOMINAL PAIN: Primary | ICD-10-CM

## 2020-11-23 DIAGNOSIS — K56.601 COMPLETE INTESTINAL OBSTRUCTION, UNSPECIFIED CAUSE (HCC): ICD-10-CM

## 2020-11-23 DIAGNOSIS — K56.2 SMALL BOWEL VOLVULUS (HCC): ICD-10-CM

## 2020-11-23 DIAGNOSIS — K56.2 VOLVULUS (HCC): Primary | ICD-10-CM

## 2020-11-23 DIAGNOSIS — K52.9 CHRONIC DIARRHEA: ICD-10-CM

## 2020-11-23 LAB
ALBUMIN SERPL-MCNC: 4 G/DL (ref 3.5–5.2)
ALBUMIN/GLOB SERPL: 1.3 G/DL
ALP SERPL-CCNC: 82 U/L (ref 39–117)
ALT SERPL W P-5'-P-CCNC: 10 U/L (ref 1–33)
ANION GAP SERPL CALCULATED.3IONS-SCNC: 9.3 MMOL/L (ref 5–15)
AST SERPL-CCNC: 14 U/L (ref 1–32)
BACTERIA UR QL AUTO: ABNORMAL /HPF
BASOPHILS # BLD AUTO: 0.04 10*3/MM3 (ref 0–0.2)
BASOPHILS NFR BLD AUTO: 0.4 % (ref 0–1.5)
BILIRUB SERPL-MCNC: 0.2 MG/DL (ref 0–1.2)
BILIRUB UR QL STRIP: NEGATIVE
BUN SERPL-MCNC: 33 MG/DL (ref 8–23)
BUN/CREAT SERPL: 44.6 (ref 7–25)
CALCIUM SPEC-SCNC: 9.7 MG/DL (ref 8.6–10.5)
CHLORIDE SERPL-SCNC: 100 MMOL/L (ref 98–107)
CLARITY UR: CLEAR
CO2 SERPL-SCNC: 30.7 MMOL/L (ref 22–29)
COD CRY URNS QL: ABNORMAL /HPF
COLOR UR: YELLOW
CREAT SERPL-MCNC: 0.74 MG/DL (ref 0.57–1)
D-LACTATE SERPL-SCNC: 1.2 MMOL/L (ref 0.5–2)
DEPRECATED RDW RBC AUTO: 45.5 FL (ref 37–54)
EOSINOPHIL # BLD AUTO: 0.01 10*3/MM3 (ref 0–0.4)
EOSINOPHIL NFR BLD AUTO: 0.1 % (ref 0.3–6.2)
ERYTHROCYTE [DISTWIDTH] IN BLOOD BY AUTOMATED COUNT: 14.6 % (ref 12.3–15.4)
GFR SERPL CREATININE-BSD FRML MDRD: 77 ML/MIN/1.73
GLOBULIN UR ELPH-MCNC: 3 GM/DL
GLUCOSE SERPL-MCNC: 116 MG/DL (ref 65–99)
GLUCOSE UR STRIP-MCNC: NEGATIVE MG/DL
HCT VFR BLD AUTO: 41.7 % (ref 34–46.6)
HGB BLD-MCNC: 12.9 G/DL (ref 12–15.9)
HGB UR QL STRIP.AUTO: ABNORMAL
HYALINE CASTS UR QL AUTO: ABNORMAL /LPF
IMM GRANULOCYTES # BLD AUTO: 0.03 10*3/MM3 (ref 0–0.05)
IMM GRANULOCYTES NFR BLD AUTO: 0.3 % (ref 0–0.5)
KETONES UR QL STRIP: ABNORMAL
LEUKOCYTE ESTERASE UR QL STRIP.AUTO: ABNORMAL
LIPASE SERPL-CCNC: 24 U/L (ref 13–60)
LYMPHOCYTES # BLD AUTO: 2.67 10*3/MM3 (ref 0.7–3.1)
LYMPHOCYTES NFR BLD AUTO: 23.8 % (ref 19.6–45.3)
MCH RBC QN AUTO: 26.4 PG (ref 26.6–33)
MCHC RBC AUTO-ENTMCNC: 30.9 G/DL (ref 31.5–35.7)
MCV RBC AUTO: 85.3 FL (ref 79–97)
MONOCYTES # BLD AUTO: 0.66 10*3/MM3 (ref 0.1–0.9)
MONOCYTES NFR BLD AUTO: 5.9 % (ref 5–12)
MUCOUS THREADS URNS QL MICRO: ABNORMAL /HPF
NEUTROPHILS NFR BLD AUTO: 69.5 % (ref 42.7–76)
NEUTROPHILS NFR BLD AUTO: 7.8 10*3/MM3 (ref 1.7–7)
NITRITE UR QL STRIP: NEGATIVE
NRBC BLD AUTO-RTO: 0 /100 WBC (ref 0–0.2)
PH UR STRIP.AUTO: 6 [PH] (ref 5–8)
PLATELET # BLD AUTO: 315 10*3/MM3 (ref 140–450)
PMV BLD AUTO: 10.1 FL (ref 6–12)
POTASSIUM SERPL-SCNC: 3.6 MMOL/L (ref 3.5–5.2)
PROT SERPL-MCNC: 7 G/DL (ref 6–8.5)
PROT UR QL STRIP: ABNORMAL
RBC # BLD AUTO: 4.89 10*6/MM3 (ref 3.77–5.28)
RBC # UR: ABNORMAL /HPF
REF LAB TEST METHOD: ABNORMAL
SARS-COV-2 RNA PNL SPEC NAA+PROBE: NOT DETECTED
SODIUM SERPL-SCNC: 140 MMOL/L (ref 136–145)
SP GR UR STRIP: 1.03 (ref 1–1.03)
SQUAMOUS #/AREA URNS HPF: ABNORMAL /HPF
UROBILINOGEN UR QL STRIP: ABNORMAL
WBC # BLD AUTO: 11.21 10*3/MM3 (ref 3.4–10.8)
WBC UR QL AUTO: ABNORMAL /HPF

## 2020-11-23 PROCEDURE — 81001 URINALYSIS AUTO W/SCOPE: CPT | Performed by: EMERGENCY MEDICINE

## 2020-11-23 PROCEDURE — 25010000003 CEFAZOLIN SODIUM-DEXTROSE 2-3 GM-%(50ML) RECONSTITUTED SOLUTION: Performed by: SURGERY

## 2020-11-23 PROCEDURE — 25010000002 HYDROMORPHONE PER 4 MG: Performed by: NURSE ANESTHETIST, CERTIFIED REGISTERED

## 2020-11-23 PROCEDURE — 83605 ASSAY OF LACTIC ACID: CPT | Performed by: EMERGENCY MEDICINE

## 2020-11-23 PROCEDURE — 0DTF0ZZ RESECTION OF RIGHT LARGE INTESTINE, OPEN APPROACH: ICD-10-PCS | Performed by: SURGERY

## 2020-11-23 PROCEDURE — 80053 COMPREHEN METABOLIC PANEL: CPT | Performed by: EMERGENCY MEDICINE

## 2020-11-23 PROCEDURE — 99222 1ST HOSP IP/OBS MODERATE 55: CPT | Performed by: SURGERY

## 2020-11-23 PROCEDURE — 44626 REPAIR BOWEL OPENING: CPT | Performed by: SURGERY

## 2020-11-23 PROCEDURE — 25010000002 PIPERACILLIN SOD-TAZOBACTAM PER 1 G: Performed by: EMERGENCY MEDICINE

## 2020-11-23 PROCEDURE — 0DN80ZZ RELEASE SMALL INTESTINE, OPEN APPROACH: ICD-10-PCS | Performed by: SURGERY

## 2020-11-23 PROCEDURE — 25010000002 HYDROMORPHONE 1 MG/ML SOLUTION: Performed by: EMERGENCY MEDICINE

## 2020-11-23 PROCEDURE — 25010000002 ONDANSETRON PER 1 MG: Performed by: NURSE ANESTHETIST, CERTIFIED REGISTERED

## 2020-11-23 PROCEDURE — 88307 TISSUE EXAM BY PATHOLOGIST: CPT | Performed by: SURGERY

## 2020-11-23 PROCEDURE — 0DSH0ZZ REPOSITION CECUM, OPEN APPROACH: ICD-10-PCS | Performed by: SURGERY

## 2020-11-23 PROCEDURE — 25010000002 ONDANSETRON PER 1 MG: Performed by: EMERGENCY MEDICINE

## 2020-11-23 PROCEDURE — 87635 SARS-COV-2 COVID-19 AMP PRB: CPT | Performed by: EMERGENCY MEDICINE

## 2020-11-23 PROCEDURE — 0DQV0ZZ REPAIR MESENTERY, OPEN APPROACH: ICD-10-PCS | Performed by: SURGERY

## 2020-11-23 PROCEDURE — 44050 REDUCE BOWEL OBSTRUCTION: CPT | Performed by: SURGERY

## 2020-11-23 PROCEDURE — 25010000002 PROPOFOL 200 MG/20ML EMULSION: Performed by: NURSE ANESTHETIST, CERTIFIED REGISTERED

## 2020-11-23 PROCEDURE — 99284 EMERGENCY DEPT VISIT MOD MDM: CPT

## 2020-11-23 PROCEDURE — 83690 ASSAY OF LIPASE: CPT | Performed by: EMERGENCY MEDICINE

## 2020-11-23 PROCEDURE — 25010000002 HYDROMORPHONE 1 MG/ML SOLUTION

## 2020-11-23 PROCEDURE — 85025 COMPLETE CBC W/AUTO DIFF WBC: CPT | Performed by: EMERGENCY MEDICINE

## 2020-11-23 PROCEDURE — 74176 CT ABD & PELVIS W/O CONTRAST: CPT

## 2020-11-23 PROCEDURE — 25010000002 MORPHINE PER 10 MG: Performed by: EMERGENCY MEDICINE

## 2020-11-23 PROCEDURE — 25010000002 FENTANYL CITRATE (PF) 100 MCG/2ML SOLUTION: Performed by: NURSE ANESTHETIST, CERTIFIED REGISTERED

## 2020-11-23 PROCEDURE — 25010000002 DEXAMETHASONE PER 1 MG: Performed by: NURSE ANESTHETIST, CERTIFIED REGISTERED

## 2020-11-23 PROCEDURE — 94799 UNLISTED PULMONARY SVC/PX: CPT

## 2020-11-23 DEVICE — PROXIMATE LINEAR CUTTER RELOAD (STNADARD) , BLUE, 55MM
Type: IMPLANTABLE DEVICE | Site: ABDOMEN | Status: FUNCTIONAL
Brand: PROXIMATE

## 2020-11-23 DEVICE — PROXIMATE RELOADABLE LINEAR CUTTER WITH SAFETY LOCK-OUT.  55MM LINEAR CUTTER.
Type: IMPLANTABLE DEVICE | Site: ABDOMEN | Status: FUNCTIONAL
Brand: PROXIMATE

## 2020-11-23 DEVICE — PROXIMATE RELOADABLE LINEAR STAPLER
Type: IMPLANTABLE DEVICE | Site: ABDOMEN | Status: FUNCTIONAL
Brand: PROXIMATE

## 2020-11-23 RX ORDER — NALOXONE HCL 0.4 MG/ML
0.1 VIAL (ML) INJECTION
Status: DISCONTINUED | OUTPATIENT
Start: 2020-11-23 | End: 2020-11-28 | Stop reason: HOSPADM

## 2020-11-23 RX ORDER — SODIUM CHLORIDE 0.9 % (FLUSH) 0.9 %
10 SYRINGE (ML) INJECTION AS NEEDED
Status: DISCONTINUED | OUTPATIENT
Start: 2020-11-23 | End: 2020-11-23 | Stop reason: HOSPADM

## 2020-11-23 RX ORDER — LORAZEPAM 2 MG/ML
0.25 INJECTION INTRAMUSCULAR AS NEEDED
Status: DISCONTINUED | OUTPATIENT
Start: 2020-11-23 | End: 2020-11-23 | Stop reason: HOSPADM

## 2020-11-23 RX ORDER — FENTANYL CITRATE 50 UG/ML
INJECTION, SOLUTION INTRAMUSCULAR; INTRAVENOUS AS NEEDED
Status: DISCONTINUED | OUTPATIENT
Start: 2020-11-23 | End: 2020-11-23 | Stop reason: SURG

## 2020-11-23 RX ORDER — MAGNESIUM HYDROXIDE 1200 MG/15ML
LIQUID ORAL AS NEEDED
Status: DISCONTINUED | OUTPATIENT
Start: 2020-11-23 | End: 2020-11-23 | Stop reason: HOSPADM

## 2020-11-23 RX ORDER — SODIUM CHLORIDE 0.9 % (FLUSH) 0.9 %
10 SYRINGE (ML) INJECTION AS NEEDED
Status: DISCONTINUED | OUTPATIENT
Start: 2020-11-23 | End: 2020-11-28 | Stop reason: HOSPADM

## 2020-11-23 RX ORDER — CEFAZOLIN SODIUM 2 G/50ML
2 SOLUTION INTRAVENOUS ONCE
Status: COMPLETED | OUTPATIENT
Start: 2020-11-23 | End: 2020-11-23

## 2020-11-23 RX ORDER — PROMETHAZINE HYDROCHLORIDE 25 MG/1
25 TABLET ORAL ONCE AS NEEDED
Status: DISCONTINUED | OUTPATIENT
Start: 2020-11-23 | End: 2020-11-23 | Stop reason: HOSPADM

## 2020-11-23 RX ORDER — ALBUTEROL SULFATE 2.5 MG/3ML
2.5 SOLUTION RESPIRATORY (INHALATION) EVERY 4 HOURS PRN
Status: DISCONTINUED | OUTPATIENT
Start: 2020-11-23 | End: 2020-11-28 | Stop reason: HOSPADM

## 2020-11-23 RX ORDER — ONDANSETRON 2 MG/ML
4 INJECTION INTRAMUSCULAR; INTRAVENOUS ONCE
Status: COMPLETED | OUTPATIENT
Start: 2020-11-23 | End: 2020-11-23

## 2020-11-23 RX ORDER — ONDANSETRON 2 MG/ML
4 INJECTION INTRAMUSCULAR; INTRAVENOUS EVERY 6 HOURS PRN
Status: DISCONTINUED | OUTPATIENT
Start: 2020-11-23 | End: 2020-11-28 | Stop reason: HOSPADM

## 2020-11-23 RX ORDER — MORPHINE SULFATE 4 MG/ML
4 INJECTION, SOLUTION INTRAMUSCULAR; INTRAVENOUS ONCE
Status: COMPLETED | OUTPATIENT
Start: 2020-11-23 | End: 2020-11-23

## 2020-11-23 RX ORDER — IPRATROPIUM BROMIDE AND ALBUTEROL SULFATE 2.5; .5 MG/3ML; MG/3ML
3 SOLUTION RESPIRATORY (INHALATION) EVERY 4 HOURS PRN
Status: DISCONTINUED | OUTPATIENT
Start: 2020-11-23 | End: 2020-11-28 | Stop reason: HOSPADM

## 2020-11-23 RX ORDER — ALBUTEROL SULFATE 90 UG/1
2 AEROSOL, METERED RESPIRATORY (INHALATION) EVERY 4 HOURS PRN
Status: DISCONTINUED | OUTPATIENT
Start: 2020-11-23 | End: 2020-11-23

## 2020-11-23 RX ORDER — KETAMINE HCL IN NACL, ISO-OSM 100MG/10ML
SYRINGE (ML) INJECTION AS NEEDED
Status: DISCONTINUED | OUTPATIENT
Start: 2020-11-23 | End: 2020-11-23 | Stop reason: SURG

## 2020-11-23 RX ORDER — SODIUM CHLORIDE 0.9 % (FLUSH) 0.9 %
3 SYRINGE (ML) INJECTION EVERY 12 HOURS SCHEDULED
Status: DISCONTINUED | OUTPATIENT
Start: 2020-11-23 | End: 2020-11-28 | Stop reason: HOSPADM

## 2020-11-23 RX ORDER — LIDOCAINE HYDROCHLORIDE 20 MG/ML
INJECTION, SOLUTION INTRAVENOUS AS NEEDED
Status: DISCONTINUED | OUTPATIENT
Start: 2020-11-23 | End: 2020-11-23 | Stop reason: SURG

## 2020-11-23 RX ORDER — ONDANSETRON 2 MG/ML
4 INJECTION INTRAMUSCULAR; INTRAVENOUS ONCE AS NEEDED
Status: DISCONTINUED | OUTPATIENT
Start: 2020-11-23 | End: 2020-11-23 | Stop reason: HOSPADM

## 2020-11-23 RX ORDER — HYDROMORPHONE HCL 110MG/55ML
PATIENT CONTROLLED ANALGESIA SYRINGE INTRAVENOUS AS NEEDED
Status: DISCONTINUED | OUTPATIENT
Start: 2020-11-23 | End: 2020-11-23 | Stop reason: SURG

## 2020-11-23 RX ORDER — ONDANSETRON 4 MG/1
4 TABLET, FILM COATED ORAL EVERY 6 HOURS PRN
Status: DISCONTINUED | OUTPATIENT
Start: 2020-11-23 | End: 2020-11-28 | Stop reason: HOSPADM

## 2020-11-23 RX ORDER — SERTRALINE HYDROCHLORIDE 100 MG/1
TABLET, FILM COATED ORAL
Qty: 135 TABLET | Refills: 1 | Status: SHIPPED | OUTPATIENT
Start: 2020-11-23 | End: 2021-05-12

## 2020-11-23 RX ORDER — BUDESONIDE AND FORMOTEROL FUMARATE DIHYDRATE 160; 4.5 UG/1; UG/1
2 AEROSOL RESPIRATORY (INHALATION)
Status: DISCONTINUED | OUTPATIENT
Start: 2020-11-23 | End: 2020-11-28 | Stop reason: HOSPADM

## 2020-11-23 RX ORDER — SODIUM CHLORIDE, SODIUM LACTATE, POTASSIUM CHLORIDE, CALCIUM CHLORIDE 600; 310; 30; 20 MG/100ML; MG/100ML; MG/100ML; MG/100ML
1000 INJECTION, SOLUTION INTRAVENOUS CONTINUOUS
Status: DISCONTINUED | OUTPATIENT
Start: 2020-11-23 | End: 2020-11-24

## 2020-11-23 RX ORDER — DEXTROSE AND SODIUM CHLORIDE 5; .45 G/100ML; G/100ML
50 INJECTION, SOLUTION INTRAVENOUS CONTINUOUS
Status: DISCONTINUED | OUTPATIENT
Start: 2020-11-23 | End: 2020-11-28 | Stop reason: HOSPADM

## 2020-11-23 RX ORDER — DEXAMETHASONE SODIUM PHOSPHATE 4 MG/ML
INJECTION, SOLUTION INTRA-ARTICULAR; INTRALESIONAL; INTRAMUSCULAR; INTRAVENOUS; SOFT TISSUE AS NEEDED
Status: DISCONTINUED | OUTPATIENT
Start: 2020-11-23 | End: 2020-11-23 | Stop reason: SURG

## 2020-11-23 RX ORDER — BUPIVACAINE HCL/0.9 % NACL/PF 0.125 %
PLASTIC BAG, INJECTION (ML) EPIDURAL AS NEEDED
Status: DISCONTINUED | OUTPATIENT
Start: 2020-11-23 | End: 2020-11-23 | Stop reason: SURG

## 2020-11-23 RX ORDER — FAMOTIDINE 10 MG/ML
20 INJECTION, SOLUTION INTRAVENOUS 2 TIMES DAILY
Status: DISCONTINUED | OUTPATIENT
Start: 2020-11-23 | End: 2020-11-28 | Stop reason: HOSPADM

## 2020-11-23 RX ORDER — ONDANSETRON 2 MG/ML
INJECTION INTRAMUSCULAR; INTRAVENOUS AS NEEDED
Status: DISCONTINUED | OUTPATIENT
Start: 2020-11-23 | End: 2020-11-23 | Stop reason: SURG

## 2020-11-23 RX ORDER — PROPOFOL 10 MG/ML
INJECTION, EMULSION INTRAVENOUS AS NEEDED
Status: DISCONTINUED | OUTPATIENT
Start: 2020-11-23 | End: 2020-11-23 | Stop reason: SURG

## 2020-11-23 RX ORDER — IPRATROPIUM BROMIDE AND ALBUTEROL SULFATE 2.5; .5 MG/3ML; MG/3ML
3 SOLUTION RESPIRATORY (INHALATION) ONCE AS NEEDED
Status: DISCONTINUED | OUTPATIENT
Start: 2020-11-23 | End: 2020-11-23 | Stop reason: HOSPADM

## 2020-11-23 RX ORDER — AZELASTINE 1 MG/ML
1 SPRAY, METERED NASAL 2 TIMES DAILY
Status: DISCONTINUED | OUTPATIENT
Start: 2020-11-23 | End: 2020-11-28 | Stop reason: HOSPADM

## 2020-11-23 RX ORDER — PROMETHAZINE HYDROCHLORIDE 25 MG/1
25 SUPPOSITORY RECTAL ONCE AS NEEDED
Status: DISCONTINUED | OUTPATIENT
Start: 2020-11-23 | End: 2020-11-23 | Stop reason: HOSPADM

## 2020-11-23 RX ORDER — HYDROCODONE BITARTRATE AND ACETAMINOPHEN 7.5; 325 MG/1; MG/1
1 TABLET ORAL EVERY 4 HOURS PRN
Status: DISCONTINUED | OUTPATIENT
Start: 2020-11-23 | End: 2020-11-24

## 2020-11-23 RX ORDER — SODIUM CHLORIDE 0.9 % (FLUSH) 0.9 %
3 SYRINGE (ML) INJECTION EVERY 12 HOURS SCHEDULED
Status: DISCONTINUED | OUTPATIENT
Start: 2020-11-23 | End: 2020-11-23 | Stop reason: HOSPADM

## 2020-11-23 RX ORDER — ROCURONIUM BROMIDE 10 MG/ML
INJECTION, SOLUTION INTRAVENOUS AS NEEDED
Status: DISCONTINUED | OUTPATIENT
Start: 2020-11-23 | End: 2020-11-23 | Stop reason: SURG

## 2020-11-23 RX ORDER — SODIUM CHLORIDE 0.9 % (FLUSH) 0.9 %
3-10 SYRINGE (ML) INJECTION AS NEEDED
Status: DISCONTINUED | OUTPATIENT
Start: 2020-11-23 | End: 2020-11-28 | Stop reason: HOSPADM

## 2020-11-23 RX ADMIN — CEFAZOLIN SODIUM 2 G: 2 SOLUTION INTRAVENOUS at 17:27

## 2020-11-23 RX ADMIN — ONDANSETRON 4 MG: 2 INJECTION, SOLUTION INTRAMUSCULAR; INTRAVENOUS at 15:37

## 2020-11-23 RX ADMIN — PROPOFOL 130 MG: 10 INJECTION, EMULSION INTRAVENOUS at 17:25

## 2020-11-23 RX ADMIN — SODIUM CHLORIDE, POTASSIUM CHLORIDE, SODIUM LACTATE AND CALCIUM CHLORIDE 1000 ML: 600; 310; 30; 20 INJECTION, SOLUTION INTRAVENOUS at 17:10

## 2020-11-23 RX ADMIN — Medication 200 MCG: at 17:35

## 2020-11-23 RX ADMIN — HYDROMORPHONE HYDROCHLORIDE 1 MG: 1 INJECTION, SOLUTION INTRAMUSCULAR; INTRAVENOUS; SUBCUTANEOUS at 16:54

## 2020-11-23 RX ADMIN — TAZOBACTAM SODIUM AND PIPERACILLIN SODIUM 3.38 G: 375; 3 INJECTION, SOLUTION INTRAVENOUS at 16:42

## 2020-11-23 RX ADMIN — MORPHINE SULFATE 4 MG: 4 INJECTION, SOLUTION INTRAMUSCULAR; INTRAVENOUS at 15:39

## 2020-11-23 RX ADMIN — Medication 30 MG: at 17:25

## 2020-11-23 RX ADMIN — HYDROMORPHONE HYDROCHLORIDE 0.5 MG: 2 INJECTION, SOLUTION INTRAMUSCULAR; INTRAVENOUS; SUBCUTANEOUS at 17:59

## 2020-11-23 RX ADMIN — Medication 0.5 MG: at 19:31

## 2020-11-23 RX ADMIN — HYDROCODONE BITARTRATE AND ACETAMINOPHEN 1 TABLET: 7.5; 325 TABLET ORAL at 21:13

## 2020-11-23 RX ADMIN — ROCURONIUM BROMIDE 30 MG: 10 INJECTION INTRAVENOUS at 18:21

## 2020-11-23 RX ADMIN — FENTANYL CITRATE 100 MCG: 50 INJECTION INTRAMUSCULAR; INTRAVENOUS at 17:25

## 2020-11-23 RX ADMIN — HYDROMORPHONE HYDROCHLORIDE 0.5 MG: 2 INJECTION, SOLUTION INTRAMUSCULAR; INTRAVENOUS; SUBCUTANEOUS at 17:36

## 2020-11-23 RX ADMIN — LIDOCAINE HYDROCHLORIDE 60 MG: 20 INJECTION, SOLUTION INTRAVENOUS at 17:25

## 2020-11-23 RX ADMIN — DEXAMETHASONE SODIUM PHOSPHATE 8 MG: 4 INJECTION, SOLUTION INTRAMUSCULAR; INTRAVENOUS at 17:25

## 2020-11-23 RX ADMIN — FAMOTIDINE 20 MG: 10 INJECTION INTRAVENOUS at 21:13

## 2020-11-23 RX ADMIN — SODIUM CHLORIDE, PRESERVATIVE FREE 3 ML: 5 INJECTION INTRAVENOUS at 21:31

## 2020-11-23 RX ADMIN — HYDROMORPHONE HYDROCHLORIDE 0.5 MG: 1 INJECTION, SOLUTION INTRAMUSCULAR; INTRAVENOUS; SUBCUTANEOUS at 19:31

## 2020-11-23 RX ADMIN — Medication 200 MCG: at 17:45

## 2020-11-23 RX ADMIN — DEXTROSE AND SODIUM CHLORIDE 100 ML/HR: 5; 450 INJECTION, SOLUTION INTRAVENOUS at 21:31

## 2020-11-23 RX ADMIN — ROCURONIUM BROMIDE 30 MG: 10 INJECTION INTRAVENOUS at 17:25

## 2020-11-23 RX ADMIN — ONDANSETRON 4 MG: 2 INJECTION INTRAMUSCULAR; INTRAVENOUS at 17:25

## 2020-11-23 NOTE — PROGRESS NOTES
"Patient: Gabi Dudley    YOB: 1945    Date: 11/23/2020    Primary Care Provider: Paul Quinteros MD    Chief Complaint   Patient presents with   • Post-op     lap mamta        History of present illness:  I saw the patient in the office today as a followup from their recent laparoscopic cholecystectomy, the pathology report did show mild chronic cholecystitis. She is complaining of nausea, vomiting, and diarrhea. She states she is miserable and rates her pain a solid 10.  Patient had been doing well, she is now 24 days status post laparoscopic cholecystectomy.  Pain started about 4 days ago.  Not severe in mainly epigastric region and diffuse throughout.  Associated diarrhea, nausea and vomiting.  Patient has sweats and chills but no fever.    Vital Signs:   Vitals:    11/23/20 1404   BP: 136/84   Pulse: 81   Temp: 98.8 °F (37.1 °C)   SpO2: 98%   Weight: 57.2 kg (126 lb)   Height: 165.1 cm (65\")       Physical Exam:   General Appearance:    Alert, cooperative, in no acute distress   Abdomen:     no masses, no organomegaly, soft and diffusely tender.  Some peritoneal signs and guarding.  No abdominal hernias.  All wounds look good with no redness or drainage.     Assessment / Plan :    1. Generalized abdominal pain    2. Chronic diarrhea        Patient appears concerning for abdominal pain, diarrhea and chills.  I have concerned she may have Covid symptoms and possibility of intra-abdominal pathology.  Recommend patient go to emergency room and get evaluated with CT scan and work-up.  May require admission for IV fluids and hydration and further work-up.    Electronically signed by Sima Moses MD  11/23/20                  "

## 2020-11-23 NOTE — ANESTHESIA PROCEDURE NOTES
Airway  Urgency: elective    Date/Time: 11/23/2020 5:25 PM  Airway not difficult    General Information and Staff    Patient location during procedure: OR  CRNA: Parish Rao CRNA    Indications and Patient Condition  Indications for airway management: airway protection    Preoxygenated: yes  MILS maintained throughout  Mask difficulty assessment: 1 - vent by mask    Final Airway Details  Final airway type: endotracheal airway      Successful airway: ETT  Cuffed: yes   Successful intubation technique: direct laryngoscopy  Facilitating devices/methods: intubating stylet  Endotracheal tube insertion site: oral  Blade: Jalen  Blade size: 3  ETT size (mm): 7.5  Cormack-Lehane Classification: grade I - full view of glottis  Placement verified by: chest auscultation and capnometry   Cuff volume (mL): 6  Measured from: teeth  ETT/EBT  to teeth (cm): 21  Number of attempts at approach: 1  Assessment: lips, teeth, and gum same as pre-op and atraumatic intubation    Additional Comments  Airway placed without problems. Dentition and lips as noted on pre-induction. ETT cuff inflated to minimal occlusive pressure. ETT secured. Upon close inspection patient was noted to have extraordinarily poor dentition. Large linear crack in what appears to be the right upper incisor. Pt missing multiple teeth and remaining teeth are in poor condition. Mouth, teeth, gums unchanged after instrumentation of airway.

## 2020-11-23 NOTE — ANESTHESIA PREPROCEDURE EVALUATION
Anesthesia Evaluation     Patient summary reviewed and Nursing notes reviewed   history of anesthetic complications: PONV  NPO Solid Status: Waived due to emergency  NPO Liquid Status: Waived due to emergency           Airway   Mallampati: II  TM distance: >3 FB  Neck ROM: full  No difficulty expected  Dental - normal exam     Pulmonary - normal exam   (+) COPD moderate, asthma,home oxygen, shortness of breath,   Cardiovascular - normal exam    ECG reviewed  PT is on anticoagulation therapy  Rhythm: regular  Rate: normal    (+) hypertension well controlled, CAD, dysrhythmias PAC, pericardial effusion, hyperlipidemia,       Neuro/Psych  (+) headaches, psychiatric history Anxiety and Depression,     GI/Hepatic/Renal/Endo    (+)  GERD,  liver disease, renal disease stones,     Musculoskeletal     (+) back pain,   Abdominal  - normal exam   Substance History - negative use     OB/GYN negative ob/gyn ROS         Other   arthritis,    history of cancer    ROS/Med Hx Other: 1.  Normal left ventricular size with low normal LV systolic function, LVEF 50-55%.  2.  Mild concentric LVH.  3.  Normal LV diastolic filling pattern.  4.  Mild right ventricular dilation with normal RV systolic function.  5.  Normal left and right atrial size.  6.  Trace MR and TR.        Adrenal adenoma  Anxiety  Chronic fatigue  Fibromyalgia  Hyperlipidemia  Hypertension  Insomnia  Osteoarthritis of knee  Osteoporosis  Pulmonary emphysema (CMS/HCC)  Simple renal cyst  Tension headache  Vitamin D deficiency  Diverticulosis  Inversion of nipple  Paroxysmal atrial fibrillation (CMS/HCC)  Pericardial effusion  Tobacco use  Coronary artery disease involving native coronary artery of native heart without angina pectoris  Autonomic dysfunction  Gastroesophageal reflux disease without esophagitis  Lung abnormality  Urinary incontinence  Calculus of gallbladder with biliary obstruction but without cholecystitis  Urge incontinence  Pain of upper  abdomen  Calculus of gallbladder without cholecystitis without obstruction  Anemia                        Anesthesia Plan    ASA 4 - emergent     general   (Risks and benefits of general anesthesia discussed with patient, including, aspiration, recall, dental damage, cardiac or respiratory compromise, stroke, fluctuations in blood pressure, seizure or death.     Pt advised that a endotracheal tube (ETT), laryngeal mask airway (LMA) or mask would be utilized to maintain the airway. Pt verbalized understanding and agreed to plan.)  intravenous induction     Anesthetic plan, all risks, benefits, and alternatives have been provided, discussed and informed consent has been obtained with: patient.    Plan discussed with CRNA.

## 2020-11-24 LAB
ANION GAP SERPL CALCULATED.3IONS-SCNC: 7 MMOL/L (ref 5–15)
BASOPHILS # BLD AUTO: 0.01 10*3/MM3 (ref 0–0.2)
BASOPHILS NFR BLD AUTO: 0.1 % (ref 0–1.5)
BUN SERPL-MCNC: 27 MG/DL (ref 8–23)
BUN/CREAT SERPL: 38 (ref 7–25)
CALCIUM SPEC-SCNC: 8.6 MG/DL (ref 8.6–10.5)
CHLORIDE SERPL-SCNC: 103 MMOL/L (ref 98–107)
CO2 SERPL-SCNC: 30 MMOL/L (ref 22–29)
CREAT SERPL-MCNC: 0.71 MG/DL (ref 0.57–1)
DEPRECATED RDW RBC AUTO: 46.2 FL (ref 37–54)
EOSINOPHIL # BLD AUTO: 0 10*3/MM3 (ref 0–0.4)
EOSINOPHIL NFR BLD AUTO: 0 % (ref 0.3–6.2)
ERYTHROCYTE [DISTWIDTH] IN BLOOD BY AUTOMATED COUNT: 14.6 % (ref 12.3–15.4)
GFR SERPL CREATININE-BSD FRML MDRD: 81 ML/MIN/1.73
GLUCOSE SERPL-MCNC: 169 MG/DL (ref 65–99)
HCT VFR BLD AUTO: 33.7 % (ref 34–46.6)
HGB BLD-MCNC: 10.3 G/DL (ref 12–15.9)
IMM GRANULOCYTES # BLD AUTO: 0.04 10*3/MM3 (ref 0–0.05)
IMM GRANULOCYTES NFR BLD AUTO: 0.4 % (ref 0–0.5)
LYMPHOCYTES # BLD AUTO: 0.96 10*3/MM3 (ref 0.7–3.1)
LYMPHOCYTES NFR BLD AUTO: 9.1 % (ref 19.6–45.3)
MCH RBC QN AUTO: 26.3 PG (ref 26.6–33)
MCHC RBC AUTO-ENTMCNC: 30.6 G/DL (ref 31.5–35.7)
MCV RBC AUTO: 86 FL (ref 79–97)
MONOCYTES # BLD AUTO: 0.62 10*3/MM3 (ref 0.1–0.9)
MONOCYTES NFR BLD AUTO: 5.9 % (ref 5–12)
NEUTROPHILS NFR BLD AUTO: 8.94 10*3/MM3 (ref 1.7–7)
NEUTROPHILS NFR BLD AUTO: 84.5 % (ref 42.7–76)
NRBC BLD AUTO-RTO: 0 /100 WBC (ref 0–0.2)
PLATELET # BLD AUTO: 219 10*3/MM3 (ref 140–450)
PMV BLD AUTO: 10.7 FL (ref 6–12)
POTASSIUM SERPL-SCNC: 3.9 MMOL/L (ref 3.5–5.2)
RBC # BLD AUTO: 3.92 10*6/MM3 (ref 3.77–5.28)
SODIUM SERPL-SCNC: 140 MMOL/L (ref 136–145)
WBC # BLD AUTO: 10.57 10*3/MM3 (ref 3.4–10.8)

## 2020-11-24 PROCEDURE — 25010000002 ONDANSETRON PER 1 MG: Performed by: SURGERY

## 2020-11-24 PROCEDURE — 94664 DEMO&/EVAL PT USE INHALER: CPT

## 2020-11-24 PROCEDURE — 99222 1ST HOSP IP/OBS MODERATE 55: CPT | Performed by: FAMILY MEDICINE

## 2020-11-24 PROCEDURE — 94799 UNLISTED PULMONARY SVC/PX: CPT

## 2020-11-24 PROCEDURE — 80048 BASIC METABOLIC PNL TOTAL CA: CPT | Performed by: SURGERY

## 2020-11-24 PROCEDURE — 85025 COMPLETE CBC W/AUTO DIFF WBC: CPT | Performed by: SURGERY

## 2020-11-24 PROCEDURE — 97161 PT EVAL LOW COMPLEX 20 MIN: CPT

## 2020-11-24 PROCEDURE — 99024 POSTOP FOLLOW-UP VISIT: CPT | Performed by: SURGERY

## 2020-11-24 PROCEDURE — 25010000002 HYDROMORPHONE 1 MG/ML SOLUTION: Performed by: SURGERY

## 2020-11-24 PROCEDURE — 94640 AIRWAY INHALATION TREATMENT: CPT

## 2020-11-24 RX ORDER — ATORVASTATIN CALCIUM 20 MG/1
20 TABLET, FILM COATED ORAL DAILY
Status: DISCONTINUED | OUTPATIENT
Start: 2020-11-24 | End: 2020-11-28 | Stop reason: HOSPADM

## 2020-11-24 RX ORDER — HYDROCODONE BITARTRATE AND ACETAMINOPHEN 10; 325 MG/1; MG/1
1 TABLET ORAL EVERY 4 HOURS PRN
Status: DISCONTINUED | OUTPATIENT
Start: 2020-11-24 | End: 2020-11-28 | Stop reason: HOSPADM

## 2020-11-24 RX ORDER — TRAZODONE HYDROCHLORIDE 50 MG/1
100 TABLET ORAL EVERY EVENING
Status: DISCONTINUED | OUTPATIENT
Start: 2020-11-24 | End: 2020-11-28 | Stop reason: HOSPADM

## 2020-11-24 RX ORDER — AMOXICILLIN 250 MG
1 CAPSULE ORAL NIGHTLY
Status: DISCONTINUED | OUTPATIENT
Start: 2020-11-24 | End: 2020-11-28 | Stop reason: HOSPADM

## 2020-11-24 RX ORDER — DULOXETIN HYDROCHLORIDE 30 MG/1
60 CAPSULE, DELAYED RELEASE ORAL 2 TIMES DAILY
Status: DISCONTINUED | OUTPATIENT
Start: 2020-11-24 | End: 2020-11-28 | Stop reason: HOSPADM

## 2020-11-24 RX ORDER — POLYETHYLENE GLYCOL 3350 17 G/17G
17 POWDER, FOR SOLUTION ORAL DAILY PRN
Status: DISCONTINUED | OUTPATIENT
Start: 2020-11-24 | End: 2020-11-28 | Stop reason: HOSPADM

## 2020-11-24 RX ADMIN — BUDESONIDE AND FORMOTEROL FUMARATE DIHYDRATE 2 PUFF: 160; 4.5 AEROSOL RESPIRATORY (INHALATION) at 20:58

## 2020-11-24 RX ADMIN — HYDROMORPHONE HYDROCHLORIDE 0.5 MG: 1 INJECTION, SOLUTION INTRAMUSCULAR; INTRAVENOUS; SUBCUTANEOUS at 20:21

## 2020-11-24 RX ADMIN — DEXTROSE AND SODIUM CHLORIDE 100 ML/HR: 5; 450 INJECTION, SOLUTION INTRAVENOUS at 17:55

## 2020-11-24 RX ADMIN — TRAZODONE HYDROCHLORIDE 100 MG: 50 TABLET ORAL at 17:53

## 2020-11-24 RX ADMIN — HYDROMORPHONE HYDROCHLORIDE 0.5 MG: 1 INJECTION, SOLUTION INTRAMUSCULAR; INTRAVENOUS; SUBCUTANEOUS at 10:25

## 2020-11-24 RX ADMIN — HYDROCODONE BITARTRATE AND ACETAMINOPHEN 1 TABLET: 10; 325 TABLET ORAL at 14:11

## 2020-11-24 RX ADMIN — HYDROCODONE BITARTRATE AND ACETAMINOPHEN 1 TABLET: 10; 325 TABLET ORAL at 05:44

## 2020-11-24 RX ADMIN — AZELASTINE HYDROCHLORIDE 1 SPRAY: 137 SPRAY, METERED NASAL at 21:00

## 2020-11-24 RX ADMIN — AZELASTINE HYDROCHLORIDE 1 SPRAY: 137 SPRAY, METERED NASAL at 09:21

## 2020-11-24 RX ADMIN — FAMOTIDINE 20 MG: 10 INJECTION INTRAVENOUS at 09:02

## 2020-11-24 RX ADMIN — HYDROCODONE BITARTRATE AND ACETAMINOPHEN 1 TABLET: 10; 325 TABLET ORAL at 10:13

## 2020-11-24 RX ADMIN — ONDANSETRON 4 MG: 2 INJECTION INTRAMUSCULAR; INTRAVENOUS at 17:53

## 2020-11-24 RX ADMIN — HYDROMORPHONE HYDROCHLORIDE 0.5 MG: 1 INJECTION, SOLUTION INTRAMUSCULAR; INTRAVENOUS; SUBCUTANEOUS at 15:29

## 2020-11-24 RX ADMIN — HYDROCODONE BITARTRATE AND ACETAMINOPHEN 1 TABLET: 10; 325 TABLET ORAL at 23:05

## 2020-11-24 RX ADMIN — BUDESONIDE AND FORMOTEROL FUMARATE DIHYDRATE 2 PUFF: 160; 4.5 AEROSOL RESPIRATORY (INHALATION) at 07:21

## 2020-11-24 RX ADMIN — HYDROMORPHONE HYDROCHLORIDE 0.5 MG: 1 INJECTION, SOLUTION INTRAMUSCULAR; INTRAVENOUS; SUBCUTANEOUS at 17:53

## 2020-11-24 RX ADMIN — DEXTROSE AND SODIUM CHLORIDE 100 ML/HR: 5; 450 INJECTION, SOLUTION INTRAVENOUS at 06:42

## 2020-11-24 RX ADMIN — ATORVASTATIN CALCIUM 20 MG: 20 TABLET, FILM COATED ORAL at 09:02

## 2020-11-24 RX ADMIN — DULOXETINE HYDROCHLORIDE 60 MG: 30 CAPSULE, DELAYED RELEASE ORAL at 09:02

## 2020-11-24 RX ADMIN — ONDANSETRON 4 MG: 2 INJECTION INTRAMUSCULAR; INTRAVENOUS at 11:04

## 2020-11-24 RX ADMIN — DOCUSATE SODIUM 50MG AND SENNOSIDES 8.6MG 1 TABLET: 8.6; 5 TABLET, FILM COATED ORAL at 20:21

## 2020-11-24 RX ADMIN — SODIUM CHLORIDE, PRESERVATIVE FREE 3 ML: 5 INJECTION INTRAVENOUS at 20:44

## 2020-11-24 RX ADMIN — HYDROCODONE BITARTRATE AND ACETAMINOPHEN 1 TABLET: 7.5; 325 TABLET ORAL at 01:08

## 2020-11-24 RX ADMIN — SODIUM CHLORIDE, PRESERVATIVE FREE 3 ML: 5 INJECTION INTRAVENOUS at 09:04

## 2020-11-24 RX ADMIN — HYDROMORPHONE HYDROCHLORIDE 0.5 MG: 1 INJECTION, SOLUTION INTRAMUSCULAR; INTRAVENOUS; SUBCUTANEOUS at 12:31

## 2020-11-24 RX ADMIN — HYDROMORPHONE HYDROCHLORIDE 0.5 MG: 1 INJECTION, SOLUTION INTRAMUSCULAR; INTRAVENOUS; SUBCUTANEOUS at 04:43

## 2020-11-24 RX ADMIN — HYDROMORPHONE HYDROCHLORIDE 0.5 MG: 1 INJECTION, SOLUTION INTRAMUSCULAR; INTRAVENOUS; SUBCUTANEOUS at 07:46

## 2020-11-24 RX ADMIN — DULOXETINE HYDROCHLORIDE 60 MG: 30 CAPSULE, DELAYED RELEASE ORAL at 20:21

## 2020-11-24 RX ADMIN — FAMOTIDINE 20 MG: 10 INJECTION INTRAVENOUS at 20:21

## 2020-11-24 NOTE — ANESTHESIA POSTPROCEDURE EVALUATION
Patient: Gabi Dudley    Procedure Summary     Date: 11/23/20 Room / Location: [facility] OR 4 /  [facility] OR    Anesthesia Start: 1722 Anesthesia Stop: 1909    Procedure: colectomy, right, closure of enterotomy x 2, reduction of internal volvulus (N/A Abdomen) Diagnosis:     Surgeon: Sima Moses MD Provider: Tanmay Carranza CRNA    Anesthesia Type: general ASA Status: 4 - Emergent          Anesthesia Type: general    Vitals  Vitals Value Taken Time   /82 11/23/20 2005   Temp 100.6 °F (38.1 °C) 11/23/20 2005   Pulse 91 11/23/20 2010   Resp 16 11/23/20 2005   SpO2 94 % 11/23/20 2010   Vitals shown include unvalidated device data.        Post Anesthesia Care and Evaluation    Patient location during evaluation: PACU  Patient participation: complete - patient participated  Level of consciousness: awake and sleepy but conscious  Pain management: adequate  Airway patency: patent  Anesthetic complications: No anesthetic complications  PONV Status: none  Cardiovascular status: acceptable and hemodynamically stable  Respiratory status: acceptable, room air, nonlabored ventilation and spontaneous ventilation  Hydration status: acceptable

## 2020-11-25 PROCEDURE — 94799 UNLISTED PULMONARY SVC/PX: CPT

## 2020-11-25 PROCEDURE — 25010000002 ONDANSETRON PER 1 MG: Performed by: SURGERY

## 2020-11-25 PROCEDURE — 97530 THERAPEUTIC ACTIVITIES: CPT

## 2020-11-25 PROCEDURE — 97110 THERAPEUTIC EXERCISES: CPT

## 2020-11-25 PROCEDURE — 25010000002 PROCHLORPERAZINE 10 MG/2ML SOLUTION: Performed by: FAMILY MEDICINE

## 2020-11-25 PROCEDURE — 99231 SBSQ HOSP IP/OBS SF/LOW 25: CPT | Performed by: INTERNAL MEDICINE

## 2020-11-25 PROCEDURE — 99024 POSTOP FOLLOW-UP VISIT: CPT | Performed by: SURGERY

## 2020-11-25 PROCEDURE — 25010000002 HYDROMORPHONE 1 MG/ML SOLUTION: Performed by: SURGERY

## 2020-11-25 RX ORDER — PROCHLORPERAZINE EDISYLATE 5 MG/ML
10 INJECTION INTRAMUSCULAR; INTRAVENOUS ONCE
Status: COMPLETED | OUTPATIENT
Start: 2020-11-25 | End: 2020-11-25

## 2020-11-25 RX ADMIN — TRAZODONE HYDROCHLORIDE 100 MG: 50 TABLET ORAL at 17:52

## 2020-11-25 RX ADMIN — HYDROMORPHONE HYDROCHLORIDE 0.5 MG: 1 INJECTION, SOLUTION INTRAMUSCULAR; INTRAVENOUS; SUBCUTANEOUS at 20:39

## 2020-11-25 RX ADMIN — SODIUM CHLORIDE, PRESERVATIVE FREE 3 ML: 5 INJECTION INTRAVENOUS at 09:01

## 2020-11-25 RX ADMIN — SODIUM CHLORIDE, PRESERVATIVE FREE 3 ML: 5 INJECTION INTRAVENOUS at 21:44

## 2020-11-25 RX ADMIN — PROCHLORPERAZINE EDISYLATE 10 MG: 5 INJECTION INTRAMUSCULAR; INTRAVENOUS at 17:52

## 2020-11-25 RX ADMIN — HYDROMORPHONE HYDROCHLORIDE 0.5 MG: 1 INJECTION, SOLUTION INTRAMUSCULAR; INTRAVENOUS; SUBCUTANEOUS at 09:00

## 2020-11-25 RX ADMIN — BUDESONIDE AND FORMOTEROL FUMARATE DIHYDRATE 2 PUFF: 160; 4.5 AEROSOL RESPIRATORY (INHALATION) at 20:28

## 2020-11-25 RX ADMIN — HYDROCODONE BITARTRATE AND ACETAMINOPHEN 1 TABLET: 10; 325 TABLET ORAL at 04:15

## 2020-11-25 RX ADMIN — HYDROCODONE BITARTRATE AND ACETAMINOPHEN 1 TABLET: 10; 325 TABLET ORAL at 16:37

## 2020-11-25 RX ADMIN — AZELASTINE HYDROCHLORIDE 1 SPRAY: 137 SPRAY, METERED NASAL at 09:00

## 2020-11-25 RX ADMIN — HYDROMORPHONE HYDROCHLORIDE 0.5 MG: 1 INJECTION, SOLUTION INTRAMUSCULAR; INTRAVENOUS; SUBCUTANEOUS at 01:03

## 2020-11-25 RX ADMIN — HYDROMORPHONE HYDROCHLORIDE 0.5 MG: 1 INJECTION, SOLUTION INTRAMUSCULAR; INTRAVENOUS; SUBCUTANEOUS at 18:04

## 2020-11-25 RX ADMIN — HYDROMORPHONE HYDROCHLORIDE 0.5 MG: 1 INJECTION, SOLUTION INTRAMUSCULAR; INTRAVENOUS; SUBCUTANEOUS at 04:57

## 2020-11-25 RX ADMIN — HYDROCODONE BITARTRATE AND ACETAMINOPHEN 1 TABLET: 10; 325 TABLET ORAL at 12:15

## 2020-11-25 RX ADMIN — HYDROMORPHONE HYDROCHLORIDE 0.5 MG: 1 INJECTION, SOLUTION INTRAMUSCULAR; INTRAVENOUS; SUBCUTANEOUS at 11:31

## 2020-11-25 RX ADMIN — ATORVASTATIN CALCIUM 20 MG: 20 TABLET, FILM COATED ORAL at 09:00

## 2020-11-25 RX ADMIN — FAMOTIDINE 20 MG: 10 INJECTION INTRAVENOUS at 20:39

## 2020-11-25 RX ADMIN — DULOXETINE HYDROCHLORIDE 60 MG: 30 CAPSULE, DELAYED RELEASE ORAL at 09:00

## 2020-11-25 RX ADMIN — ONDANSETRON 4 MG: 2 INJECTION INTRAMUSCULAR; INTRAVENOUS at 15:06

## 2020-11-25 RX ADMIN — DOCUSATE SODIUM 50MG AND SENNOSIDES 8.6MG 1 TABLET: 8.6; 5 TABLET, FILM COATED ORAL at 20:40

## 2020-11-25 RX ADMIN — DEXTROSE AND SODIUM CHLORIDE 100 ML/HR: 5; 450 INJECTION, SOLUTION INTRAVENOUS at 04:57

## 2020-11-25 RX ADMIN — FAMOTIDINE 20 MG: 10 INJECTION INTRAVENOUS at 09:00

## 2020-11-25 RX ADMIN — AZELASTINE HYDROCHLORIDE 1 SPRAY: 137 SPRAY, METERED NASAL at 20:40

## 2020-11-25 RX ADMIN — SODIUM CHLORIDE, PRESERVATIVE FREE 10 ML: 5 INJECTION INTRAVENOUS at 20:40

## 2020-11-25 RX ADMIN — DULOXETINE HYDROCHLORIDE 60 MG: 30 CAPSULE, DELAYED RELEASE ORAL at 20:40

## 2020-11-26 PROCEDURE — 94799 UNLISTED PULMONARY SVC/PX: CPT

## 2020-11-26 PROCEDURE — 99231 SBSQ HOSP IP/OBS SF/LOW 25: CPT | Performed by: INTERNAL MEDICINE

## 2020-11-26 PROCEDURE — 25010000002 HYDROMORPHONE 1 MG/ML SOLUTION: Performed by: SURGERY

## 2020-11-26 PROCEDURE — 99024 POSTOP FOLLOW-UP VISIT: CPT | Performed by: SURGERY

## 2020-11-26 PROCEDURE — 97116 GAIT TRAINING THERAPY: CPT

## 2020-11-26 RX ADMIN — HYDROCODONE BITARTRATE AND ACETAMINOPHEN 1 TABLET: 10; 325 TABLET ORAL at 18:36

## 2020-11-26 RX ADMIN — SODIUM CHLORIDE, PRESERVATIVE FREE 3 ML: 5 INJECTION INTRAVENOUS at 21:12

## 2020-11-26 RX ADMIN — FAMOTIDINE 20 MG: 10 INJECTION INTRAVENOUS at 08:23

## 2020-11-26 RX ADMIN — DULOXETINE HYDROCHLORIDE 60 MG: 30 CAPSULE, DELAYED RELEASE ORAL at 20:43

## 2020-11-26 RX ADMIN — AZELASTINE HYDROCHLORIDE 1 SPRAY: 137 SPRAY, METERED NASAL at 20:45

## 2020-11-26 RX ADMIN — HYDROCODONE BITARTRATE AND ACETAMINOPHEN 1 TABLET: 10; 325 TABLET ORAL at 11:52

## 2020-11-26 RX ADMIN — ATORVASTATIN CALCIUM 20 MG: 20 TABLET, FILM COATED ORAL at 08:23

## 2020-11-26 RX ADMIN — HYDROMORPHONE HYDROCHLORIDE 0.5 MG: 1 INJECTION, SOLUTION INTRAMUSCULAR; INTRAVENOUS; SUBCUTANEOUS at 17:43

## 2020-11-26 RX ADMIN — HYDROMORPHONE HYDROCHLORIDE 0.5 MG: 1 INJECTION, SOLUTION INTRAMUSCULAR; INTRAVENOUS; SUBCUTANEOUS at 08:23

## 2020-11-26 RX ADMIN — HYDROMORPHONE HYDROCHLORIDE 0.5 MG: 1 INJECTION, SOLUTION INTRAMUSCULAR; INTRAVENOUS; SUBCUTANEOUS at 19:37

## 2020-11-26 RX ADMIN — DEXTROSE AND SODIUM CHLORIDE 75 ML/HR: 5; 450 INJECTION, SOLUTION INTRAVENOUS at 17:39

## 2020-11-26 RX ADMIN — HYDROCODONE BITARTRATE AND ACETAMINOPHEN 1 TABLET: 10; 325 TABLET ORAL at 06:53

## 2020-11-26 RX ADMIN — SODIUM CHLORIDE, PRESERVATIVE FREE 3 ML: 5 INJECTION INTRAVENOUS at 08:23

## 2020-11-26 RX ADMIN — DOCUSATE SODIUM 50MG AND SENNOSIDES 8.6MG 1 TABLET: 8.6; 5 TABLET, FILM COATED ORAL at 20:43

## 2020-11-26 RX ADMIN — BUDESONIDE AND FORMOTEROL FUMARATE DIHYDRATE 2 PUFF: 160; 4.5 AEROSOL RESPIRATORY (INHALATION) at 07:16

## 2020-11-26 RX ADMIN — DULOXETINE HYDROCHLORIDE 60 MG: 30 CAPSULE, DELAYED RELEASE ORAL at 08:23

## 2020-11-26 RX ADMIN — HYDROMORPHONE HYDROCHLORIDE 0.5 MG: 1 INJECTION, SOLUTION INTRAMUSCULAR; INTRAVENOUS; SUBCUTANEOUS at 15:30

## 2020-11-26 RX ADMIN — FAMOTIDINE 20 MG: 10 INJECTION INTRAVENOUS at 20:43

## 2020-11-26 RX ADMIN — HYDROMORPHONE HYDROCHLORIDE 0.5 MG: 1 INJECTION, SOLUTION INTRAMUSCULAR; INTRAVENOUS; SUBCUTANEOUS at 23:06

## 2020-11-26 RX ADMIN — HYDROMORPHONE HYDROCHLORIDE 0.5 MG: 1 INJECTION, SOLUTION INTRAMUSCULAR; INTRAVENOUS; SUBCUTANEOUS at 11:04

## 2020-11-26 RX ADMIN — BUDESONIDE AND FORMOTEROL FUMARATE DIHYDRATE 2 PUFF: 160; 4.5 AEROSOL RESPIRATORY (INHALATION) at 19:21

## 2020-11-26 RX ADMIN — HYDROMORPHONE HYDROCHLORIDE 0.5 MG: 1 INJECTION, SOLUTION INTRAMUSCULAR; INTRAVENOUS; SUBCUTANEOUS at 01:25

## 2020-11-26 RX ADMIN — AZELASTINE HYDROCHLORIDE 1 SPRAY: 137 SPRAY, METERED NASAL at 08:35

## 2020-11-26 RX ADMIN — TRAZODONE HYDROCHLORIDE 100 MG: 50 TABLET ORAL at 17:36

## 2020-11-27 PROCEDURE — 99231 SBSQ HOSP IP/OBS SF/LOW 25: CPT | Performed by: INTERNAL MEDICINE

## 2020-11-27 PROCEDURE — 94799 UNLISTED PULMONARY SVC/PX: CPT

## 2020-11-27 PROCEDURE — 25010000002 HYDROMORPHONE 1 MG/ML SOLUTION: Performed by: SURGERY

## 2020-11-27 PROCEDURE — 97116 GAIT TRAINING THERAPY: CPT

## 2020-11-27 PROCEDURE — 25010000002 ONDANSETRON PER 1 MG: Performed by: SURGERY

## 2020-11-27 PROCEDURE — 99024 POSTOP FOLLOW-UP VISIT: CPT | Performed by: SURGERY

## 2020-11-27 RX ORDER — ACETAMINOPHEN 500 MG
500 TABLET ORAL EVERY 6 HOURS PRN
Status: DISCONTINUED | OUTPATIENT
Start: 2020-11-27 | End: 2020-11-28 | Stop reason: HOSPADM

## 2020-11-27 RX ADMIN — DEXTROSE AND SODIUM CHLORIDE 75 ML/HR: 5; 450 INJECTION, SOLUTION INTRAVENOUS at 08:34

## 2020-11-27 RX ADMIN — DOCUSATE SODIUM 50MG AND SENNOSIDES 8.6MG 1 TABLET: 8.6; 5 TABLET, FILM COATED ORAL at 22:32

## 2020-11-27 RX ADMIN — FAMOTIDINE 20 MG: 10 INJECTION INTRAVENOUS at 08:39

## 2020-11-27 RX ADMIN — HYDROCODONE BITARTRATE AND ACETAMINOPHEN 1 TABLET: 10; 325 TABLET ORAL at 10:00

## 2020-11-27 RX ADMIN — HYDROCODONE BITARTRATE AND ACETAMINOPHEN 1 TABLET: 10; 325 TABLET ORAL at 06:12

## 2020-11-27 RX ADMIN — HYDROCODONE BITARTRATE AND ACETAMINOPHEN 1 TABLET: 10; 325 TABLET ORAL at 15:40

## 2020-11-27 RX ADMIN — FAMOTIDINE 20 MG: 10 INJECTION INTRAVENOUS at 22:32

## 2020-11-27 RX ADMIN — AZELASTINE HYDROCHLORIDE 1 SPRAY: 137 SPRAY, METERED NASAL at 08:35

## 2020-11-27 RX ADMIN — DULOXETINE HYDROCHLORIDE 60 MG: 30 CAPSULE, DELAYED RELEASE ORAL at 22:32

## 2020-11-27 RX ADMIN — DULOXETINE HYDROCHLORIDE 60 MG: 30 CAPSULE, DELAYED RELEASE ORAL at 08:39

## 2020-11-27 RX ADMIN — BUDESONIDE AND FORMOTEROL FUMARATE DIHYDRATE 2 PUFF: 160; 4.5 AEROSOL RESPIRATORY (INHALATION) at 19:31

## 2020-11-27 RX ADMIN — SODIUM CHLORIDE, PRESERVATIVE FREE 3 ML: 5 INJECTION INTRAVENOUS at 08:38

## 2020-11-27 RX ADMIN — TRAZODONE HYDROCHLORIDE 100 MG: 50 TABLET ORAL at 17:50

## 2020-11-27 RX ADMIN — HYDROMORPHONE HYDROCHLORIDE 0.5 MG: 1 INJECTION, SOLUTION INTRAMUSCULAR; INTRAVENOUS; SUBCUTANEOUS at 08:39

## 2020-11-27 RX ADMIN — HYDROCODONE BITARTRATE AND ACETAMINOPHEN 1 TABLET: 10; 325 TABLET ORAL at 22:54

## 2020-11-27 RX ADMIN — SODIUM CHLORIDE, PRESERVATIVE FREE 3 ML: 5 INJECTION INTRAVENOUS at 22:32

## 2020-11-27 RX ADMIN — ONDANSETRON 4 MG: 2 INJECTION INTRAMUSCULAR; INTRAVENOUS at 08:39

## 2020-11-27 RX ADMIN — AZELASTINE HYDROCHLORIDE 1 SPRAY: 137 SPRAY, METERED NASAL at 22:32

## 2020-11-27 RX ADMIN — ATORVASTATIN CALCIUM 20 MG: 20 TABLET, FILM COATED ORAL at 08:39

## 2020-11-27 RX ADMIN — HYDROCODONE BITARTRATE AND ACETAMINOPHEN 1 TABLET: 10; 325 TABLET ORAL at 00:45

## 2020-11-27 RX ADMIN — BUDESONIDE AND FORMOTEROL FUMARATE DIHYDRATE 2 PUFF: 160; 4.5 AEROSOL RESPIRATORY (INHALATION) at 07:19

## 2020-11-28 ENCOUNTER — READMISSION MANAGEMENT (OUTPATIENT)
Dept: CALL CENTER | Facility: HOSPITAL | Age: 75
End: 2020-11-28

## 2020-11-28 VITALS
BODY MASS INDEX: 21.49 KG/M2 | RESPIRATION RATE: 18 BRPM | SYSTOLIC BLOOD PRESSURE: 120 MMHG | TEMPERATURE: 97.5 F | HEIGHT: 65 IN | OXYGEN SATURATION: 98 % | HEART RATE: 97 BPM | WEIGHT: 128.97 LBS | DIASTOLIC BLOOD PRESSURE: 86 MMHG

## 2020-11-28 LAB
ANION GAP SERPL CALCULATED.3IONS-SCNC: 8.6 MMOL/L (ref 5–15)
BUN SERPL-MCNC: 9 MG/DL (ref 8–23)
BUN/CREAT SERPL: 17.3 (ref 7–25)
CALCIUM SPEC-SCNC: 8.7 MG/DL (ref 8.6–10.5)
CHLORIDE SERPL-SCNC: 102 MMOL/L (ref 98–107)
CO2 SERPL-SCNC: 33.4 MMOL/L (ref 22–29)
CREAT SERPL-MCNC: 0.52 MG/DL (ref 0.57–1)
DEPRECATED RDW RBC AUTO: 46.6 FL (ref 37–54)
ERYTHROCYTE [DISTWIDTH] IN BLOOD BY AUTOMATED COUNT: 14.7 % (ref 12.3–15.4)
GFR SERPL CREATININE-BSD FRML MDRD: 116 ML/MIN/1.73
GLUCOSE SERPL-MCNC: 103 MG/DL (ref 65–99)
HCT VFR BLD AUTO: 32.9 % (ref 34–46.6)
HGB BLD-MCNC: 10 G/DL (ref 12–15.9)
MCH RBC QN AUTO: 26.4 PG (ref 26.6–33)
MCHC RBC AUTO-ENTMCNC: 30.4 G/DL (ref 31.5–35.7)
MCV RBC AUTO: 86.8 FL (ref 79–97)
PLATELET # BLD AUTO: 210 10*3/MM3 (ref 140–450)
PMV BLD AUTO: 10.2 FL (ref 6–12)
POTASSIUM SERPL-SCNC: 3 MMOL/L (ref 3.5–5.2)
RBC # BLD AUTO: 3.79 10*6/MM3 (ref 3.77–5.28)
SODIUM SERPL-SCNC: 144 MMOL/L (ref 136–145)
WBC # BLD AUTO: 5.14 10*3/MM3 (ref 3.4–10.8)

## 2020-11-28 PROCEDURE — 99024 POSTOP FOLLOW-UP VISIT: CPT | Performed by: SURGERY

## 2020-11-28 PROCEDURE — 80048 BASIC METABOLIC PNL TOTAL CA: CPT | Performed by: SURGERY

## 2020-11-28 PROCEDURE — 94799 UNLISTED PULMONARY SVC/PX: CPT

## 2020-11-28 PROCEDURE — 85027 COMPLETE CBC AUTOMATED: CPT | Performed by: SURGERY

## 2020-11-28 RX ORDER — POTASSIUM CHLORIDE 1.5 G/1.77G
40 POWDER, FOR SOLUTION ORAL ONCE
Status: COMPLETED | OUTPATIENT
Start: 2020-11-28 | End: 2020-11-28

## 2020-11-28 RX ORDER — POTASSIUM CHLORIDE 1.5 G/1.77G
40 POWDER, FOR SOLUTION ORAL DAILY
Qty: 2 PACKET | Refills: 0 | Status: SHIPPED | OUTPATIENT
Start: 2020-11-28 | End: 2021-03-11 | Stop reason: SDUPTHER

## 2020-11-28 RX ADMIN — FAMOTIDINE 20 MG: 10 INJECTION INTRAVENOUS at 09:48

## 2020-11-28 RX ADMIN — POTASSIUM CHLORIDE 40 MEQ: 1.5 POWDER, FOR SOLUTION ORAL at 11:01

## 2020-11-28 RX ADMIN — HYDROCODONE BITARTRATE AND ACETAMINOPHEN 1 TABLET: 10; 325 TABLET ORAL at 03:31

## 2020-11-28 RX ADMIN — SODIUM CHLORIDE, PRESERVATIVE FREE 3 ML: 5 INJECTION INTRAVENOUS at 09:48

## 2020-11-28 RX ADMIN — ATORVASTATIN CALCIUM 20 MG: 20 TABLET, FILM COATED ORAL at 09:48

## 2020-11-28 RX ADMIN — BUDESONIDE AND FORMOTEROL FUMARATE DIHYDRATE 2 PUFF: 160; 4.5 AEROSOL RESPIRATORY (INHALATION) at 06:33

## 2020-11-28 RX ADMIN — DULOXETINE HYDROCHLORIDE 60 MG: 30 CAPSULE, DELAYED RELEASE ORAL at 09:48

## 2020-11-28 RX ADMIN — AZELASTINE HYDROCHLORIDE 1 SPRAY: 137 SPRAY, METERED NASAL at 09:48

## 2020-11-28 NOTE — OUTREACH NOTE
Prep Survey      Responses   [facility] patient discharged from?  [place]   Is LACE score < 7 ?  No   Eligibility  [facility]   Date of Admission  11/23/20   Date of Discharge  11/28/20   Discharge Disposition  Home or Self Care   Discharge diagnosis  colectomy, right, closure of enterotomy x 2, reduction of internal volvulus   Does the patient have one of the following disease processes/diagnoses(primary or secondary)?  General Surgery   Does the patient have Home health ordered?  No   Is there a DME ordered?  No   Prep survey completed?  Yes          Cait Wells RN

## 2020-11-30 ENCOUNTER — TRANSITIONAL CARE MANAGEMENT TELEPHONE ENCOUNTER (OUTPATIENT)
Dept: CALL CENTER | Facility: HOSPITAL | Age: 75
End: 2020-11-30

## 2020-11-30 NOTE — OUTREACH NOTE
Call Center TCM Note      Responses   [facility] patient discharged from?  [place]   Does the patient have one of the following disease processes/diagnoses(primary or secondary)?  General Surgery   TCM attempt successful?  Yes   Call start time  1620   Call end time  1622   Discharge diagnosis  colectomy, right, closure of enterotomy x 2, reduction of internal volvulus   Does the patient have all medications related to this admission filled (includes all antibiotics, pain medications, etc.)  Yes   Is the patient taking all medications as directed (includes completed medication regime)?  Yes   Does the patient have a follow up appointment scheduled with their surgeon?  Yes   Has the patient kept scheduled appointments due by today?  N/A   Comments  f/u with surgeon on 12/7,  f/u with PCP Dr. Quinteros on 12/9   Psychosocial issues?  No   Did the patient receive a copy of their discharge instructions?  Yes   Nursing interventions  Reviewed instructions with patient   What is the patient's perception of their health status since discharge?  Improving   Nursing interventions  Nurse provided patient education   Is the patient /caregiver able to teach back basic post-op care?  Continue use of incentive spirometry at least 1 week post discharge, Practice 'cough and deep breath', Drive as instructed by MD in discharge instructions, Take showers only when approved by MD-sponge bathe until then, No tub bath, swimming, or hot tub until instructed by MD, Keep incision areas clean,dry and protected, Do not remove steri-strips, Lifting as instructed by MD in discharge instructions   Is the patient/caregiver able to teach back signs and symptoms of incisional infection?  Fever   Is the patient/caregiver able to teach back steps to recovery at home?  Set small, achievable goals for return to baseline health, Rest and rebuild strength, gradually increase activity   Is the patient/caregiver able to teach back the hierarchy of who to  call/visit for symptoms/problems? PCP, Specialist, Home health nurse, Urgent Care, ED, 911  Yes   TCM call completed?  Yes   Wrap up additional comments  States she is doing well, confirmed f/u appts, no questions or concerns.          Jessie Wood RN    11/30/2020, 16:22 EST

## 2020-12-01 LAB
LAB AP CASE REPORT: NORMAL
PATH REPORT.FINAL DX SPEC: NORMAL

## 2020-12-07 ENCOUNTER — OFFICE VISIT (OUTPATIENT)
Dept: SURGERY | Facility: CLINIC | Age: 75
End: 2020-12-07

## 2020-12-07 VITALS — BODY MASS INDEX: 21.33 KG/M2 | HEIGHT: 65 IN | WEIGHT: 128 LBS

## 2020-12-07 DIAGNOSIS — Z48.89 POSTOPERATIVE VISIT: Primary | ICD-10-CM

## 2020-12-07 PROCEDURE — 99024 POSTOP FOLLOW-UP VISIT: CPT | Performed by: SURGERY

## 2020-12-07 NOTE — PROGRESS NOTES
Patient: Gabi Dudley    YOB: 1945    Date: 12/07/2020    Primary Care Provider: Paul Quinteros MD    No chief complaint on file.      History of present illness:  I saw the patient in the office today as a followup from their recent hernia repair.  They state that they have done well and are having no complaints.    Vital Signs:   There were no vitals filed for this visit.    Physical Exam:   General Appearance:    Alert, cooperative, in no acute distress   Abdomen:     no masses, no organomegaly, soft non-tender, non-distended, no guarding, wounds are well healed, no evidence of recurrent hernia     Assessment / Plan:    No diagnosis found.    I did discuss the situation with the patient today in the office and they have done well from their recent herniorraphy.  I have released the patient back to normal activity, they understand that they need to be careful about heavy lifting.  I need to see the patient back in the office only if they are having further problems, they know to call me if they are.    Electronically signed by Racquel Dean  12/07/20

## 2020-12-07 NOTE — PROGRESS NOTES
"Patient: Gabi Dudley    YOB: 1945    Date: 12/07/2020    Primary Care Provider: Paul Quinteros MD    Chief Complaint   Patient presents with   • Post-op     colectomy        History of present illness:  I saw the patient in the office today as a followup from their recent right hemicolectomy.  They state that they have done well and are having no complaints.    Vital Signs:   Vitals:    12/07/20 1622   Weight: 58.1 kg (128 lb)   Height: 165.1 cm (65\")       Physical Exam:   General Appearance:    Alert, cooperative, in no acute distress   Abdomen:     no masses, no organomegaly, soft non-tender, non-distended, no guarding, wounds are well healed     Assessment / Plan:    1. Postoperative visit        I did discuss the situation with the patient today in the office and they have done well from their recent right hemicolectomy.  I have released the patient back to normal activity, they understand that they need to be careful about heavy lifting.  I need to see the patient back in the office only if they are having further problems, they know to call me if they are.  Continue light duty for 2 more weeks.  Follow-up in 1 year for colonoscopy.      Electronically signed by Sima Moses MD  12/08/20                    "

## 2020-12-08 ENCOUNTER — READMISSION MANAGEMENT (OUTPATIENT)
Dept: CALL CENTER | Facility: HOSPITAL | Age: 75
End: 2020-12-08

## 2020-12-08 NOTE — OUTREACH NOTE
ASSESSMENT:  1. Dysphagia, unspecified type    2. Gastroesophageal reflux disease without esophagitis    3. Systemic sclerosis (CMS/HCC)        PLAN:  · Considerable time was spent reviewing the records and discussing them with the patient. There are multiple questions and concerns are addressed in detail.  · Lifestyle and diet modification was recommended  · Schedule EGD for dysphagia and screening colonoscopy with Dr. Finn.  · Continue Protonix.  · Recommended eating slowly with small bites. Avoid foods that are problematic.  · I will follow-up with her after her test results are available.  Darian Haley MD, I want to thank you for allowing me to take part in their care.      CHIEF COMPLAINTS / REASON FOR CONSULTATION:  Chief Complaint   Patient presents with   • Dysphagia         PRESENT HISTORY:  Yuliya Mitchell is a 77 year old is a pleasant female.    I am seeing this patient in consultation at the request of Darian Haley MD for above-mentioned symptoms.    Patient presents to clinic today for concerns with dysphagia. She's here with her . She is noticed over the last month or so that she's having a harder time with swallowing. Typically with meats or breads. She feels like the food is stuck in the back of her throat. She is a pressure sensation but no pain. She has to wait until the food passes before she can eat again. She is not having to induce vomiting to relieve the symptoms. Her appetite is slightly decreased with a slight decrease in p.o. intake. But she still feels hungry. Her weight is stable.    She denies any melena bright red blood per rectum.    Denies any odynophagia or dysphagia.    REVIEW OF SYSTEMS:  Review of all other symptoms are negative except for the ones mentioned above    ENDOSCOPIC HISTORY:  · EGD 11/2/2011 with Dr. Mcdermott, revealed normal esophagus. Biopsies reveal reflux esophagitis.    PERSONAL HISTORY:  She is  with one daughter.  She is a  General Surgery Week 2 Survey      Responses   [facility], [place], operated by [facility] patient discharged from?  [facility]   Does the patient have one of the following disease processes/diagnoses(primary or secondary)?  General Surgery   Week 2 attempt successful?  Yes   Call start time  1220   Call end time  1226   Discharge diagnosis  colectomy, right, closure of enterotomy x 2, reduction of internal volvulus   Meds reviewed with patient/caregiver?  Yes   Is the patient having any side effects they believe may be caused by any medication additions or changes?  No   Does the patient have all medications related to this admission filled (includes all antibiotics, pain medications, etc.)  Yes   Is the patient taking all medications as directed (includes completed medication regime)?  Yes   Does the patient have a follow up appointment scheduled with their surgeon?  Yes   Has the patient kept scheduled appointments due by today?  Yes   Comments  f/u with surgeon on 12/7,  f/u with PCP Dr. Quinteros on 12/9   Has home health visited the patient within 72 hours of discharge?  N/A   Psychosocial issues?  No   Did the patient receive a copy of their discharge instructions?  Yes   Nursing interventions  Reviewed instructions with patient   What is the patient's perception of their health status since discharge?  Improving   Nursing interventions  Nurse provided patient education   Is the patient /caregiver able to teach back basic post-op care?  Continue use of incentive spirometry at least 1 week post discharge, Practice 'cough and deep breath', Keep incision areas clean,dry and protected, Lifting as instructed by MD in discharge instructions, No tub bath, swimming, or hot tub until instructed by MD, Take showers only when approved by MD-sponge bathe until then   Is the patient/caregiver able to teach back signs and symptoms of incisional infection?  Increased redness, swelling or pain at the incisonal site, Increased drainage or bleeding, Incisional  nonsmoker.  Rare alcohol use.    FAMILY HISTORY:  Family history is negative for colon, liver, stomach cancer.  No family history of IBD.      PHYSICAL EXAM:  Visit Vitals  /60 (BP Location: [facility], Patient Position: Sitting, Cuff Size: Regular)   Pulse 80   Wt 62.6 kg   BMI 26.07 kg/m²     Pleasant female sitting in chair, with appropriate mood and appearance, with no signs of acute distress.  Body mass index is 26.07 kg/m².  CENTRAL NERVOUS SYSTEM: The patient is awake, alert, oriented x3 with intact recent and remote memory.  No gross motor or sensory deficits.   EYES: No icterus noted on conjunctival exam.   EAR AND NOSE AND THROAT: No lesion of the ears, nose or throat. Oral, nasal and tongue mucosa normal. Lips with no cyanosis. Teeth and gums appear grossly normal.   NECK: Supple. No masses. No thyroid enlargement. Trachea is centered. JVD negative.  LUNGS: Bilateral air entry is fair. No wheezing or rhonchi heard.  CHEST: Clear with no use of accessory muscles. Bilaterally symmetrical.  CARDIOVASCULAR SYSTEM: S1, S2 present. No systolic murmur. No abdominal bruit. Femoral, pedal and popliteal pulses are intact. No peripheral edema.  ABDOMEN: Soft. Bowel sounds are present. No hepatosplenomegaly appreciated. No masses felt. No rebound, rigidity, guarding.  LYMPHATICS: No lymph nodes in the neck, supraclavicular area  MUSCULOSKELETAL: Gait is normal. Nails have no cyanosis. No clubbing. Full range of motion in the arms and legs.  SKIN: Intact and warm. No spider angiomas. No jaundice.    LABS AND IMAGING STUDIES REIVIEWED IN [facility] IN DETAIL.  Past medical history, past surgical history, medications, allergies, social history and family history reviewed with the patient and in [facility].    Past Medical History:   Diagnosis Date   • Allergy    • CAD (coronary artery disease)    • Degenerative lumbar disc    • Disorder of bone and cartilage, unspecified 06/08/2010   • Esophageal dysmotility    • GERD  warmth, Pus or odor from incision, Fever   Is the patient/caregiver able to teach back steps to recovery at home?  Set small, achievable goals for return to baseline health, Rest and rebuild strength, gradually increase activity, Eat a well-balance diet, Make a list of questions for surgeon's appointment   If the patient is a current smoker, are they able to teach back resources for cessation?  Not a smoker   Is the patient/caregiver able to teach back the hierarchy of who to call/visit for symptoms/problems? PCP, Specialist, Home health nurse, Urgent Care, ED, 911  Yes   Additional teach back comments  Says she is doing well, not smoking, incisions are healing. Saw surgeon yesterday.   Week 2 call completed?  Yes   Wrap up additional comments  Resumed her eliquis yesterday, staples removed, incision looked good.           Melly You RN   (gastroesophageal reflux disease)    • H/O heart bypass surgery    • HLD (hyperlipidemia)    • HTN (hypertension)    • Hypothyroidism    • Knee osteoarthritis    • Malignant neoplasm (CMS/HCC)     thyroid   • Osteoporosis    • Raynaud's syndrome    • Systemic sclerosis (CMS/HCC)     schleraderma       Past Surgical History:   Procedure Laterality Date   • Amputation Right 11/29/2017    Willsey/index/partial amp   • Colonoscopy diagnostic  10/23/2007   • Coronary artery bypass graft  10/17/2005   • Esophagogastroduodenoscopy transoral flex w/bx single or mult  11/02/2011   • Eye surgery     • Iridotomy / iridectomy  09/27/2006   • Joint replacement  12/1/14    kelly total knee arthroplasty   • Left heart cath,percutaneous  10/12/2005   • Remv cataract extracap insert lens  01/18/2012; 01/11/2012   • Thyroidectomy  1995   • Tubal ligation         Current Outpatient Prescriptions   Medication Sig Dispense Refill   • Propylene Glycol-Glycerin (SOOTHE) 0.6-0.6 % Solution 1 drop in each eye twice daily.     • Insulin Pen Needle (PEN NEEDLES) 32G X 4 MM Misc 1 each daily. To use with Forteo 90 each 3   • levothyroxine (SYNTHROID, LEVOTHROID) 75 MCG tablet TAKE 1 TABLET BY MOUTH EVERY OTHER DAY ALTERNATING WITH 50 MCG 90 tablet 0   • levothyroxine (SYNTHROID, LEVOTHROID) 50 MCG tablet ALTERNATING 50 MCG AND 75 MCG EVERY OTHER DAY 90 tablet 0   • CALCIUM CARBONATE-VITAMIN D PO Take 1 tablet by mouth daily.     • cholecalciferol (VITAMIN D3) 1000 UNITS tablet Take 1,000 Units by mouth daily.     • isosorbide mononitrate (IMDUR) 60 MG 24 hr tablet TAKE 1 TABLET BY MOUTH DAILY 90 tablet 3   • simvastatin (ZOCOR) 40 MG tablet TAKE 1 TABLET BY MOUTH EVERY EVENING 90 tablet 3   • DILT- MG 24 hr capsule TAKE 1 CAPSULE BY MOUTH DAILY 90 capsule 3   • aspirin 81 MG tablet Take 81 mg by mouth daily.     • CARBOXYMethylcellulose (REFRESH PLUS) 0.5 % SOLN Apply 1 drop to eye 4 times daily as needed. Indications: Irritation of the  Eye     • Bimatoprost (LUMIGAN) 0.01 % SOLN Place 1 drop into both eyes Every evening.     • CycloSPORINE (RESTASIS OP) Apply 1 drop to eye every 12 hours. To each eye     • Multiple Vitamins-Minerals (MULTIVITAMIN PO) Take 1 tablet by mouth daily.     • pantoprazole (PROTONIX) 40 MG tablet Take 40 mg by mouth daily.     • Polyvinyl Alcohol-Povidone (FRESHKOTE OP) Place 1 drop into both eyes 2 times daily.       No current facility-administered medications for this visit.        ALLERGIES:   Allergen Reactions   • Augmentin [Amoclan] DIARRHEA   • Lipitor [Atorvastatin Calcium] Muscle Spasm   • Meloxicam SHORTNESS OF BREATH   • Sulfa Drugs Cross Reactors HIVES   • Combigan [Brimonidine Tartrate-Timolol] CARDIAC DISTURBANCES     HTN       Social History     Social History   • Marital status:      Spouse name: Bryce   • Number of children: 1   • Years of education: N/A     Social History Main Topics   • Smoking status: Never Smoker   • Smokeless tobacco: Never Used   • Alcohol use Yes      Comment: 1-2 per month   • Drug use: No   • Sexual activity: Not Asked     Other Topics Concern   • Caffeine Concern Yes     2 cups of coffee   • Sleep Concern No   • Special Diet No   • Exercise Yes     Grandchildren keep her busy. She joined [employer] and [employer]. She does twice a week   • Bike Helmet Yes     Doesn't ride anymore.   • Seat Belt Yes     Social History Narrative    She is  with one child. SHe has 2 grandchildren..She is retired. SHe used to be a correctional linda at the correction. SHe graduated from high school. SHe mostly does housework, gardening, and taking care of grandchildren. SHe does drive. She likes to work in the garden but harder with her knees and having to kneel. THey bought another house. THey are busy.        Family History   Problem Relation Age of Onset   • Heart disease Mother    • High blood pressure Mother    • High cholesterol Mother    • Heart disease Father    • High blood  pressure Father    • Heart disease Sister    • Heart disease Brother    • Heart disease Brother    • Heart disease Brother        Patient's history and exam were discussed with Dr. Finn. Plan of care was agreed upon.

## 2020-12-09 ENCOUNTER — OFFICE VISIT (OUTPATIENT)
Dept: INTERNAL MEDICINE | Facility: CLINIC | Age: 75
End: 2020-12-09

## 2020-12-09 VITALS
TEMPERATURE: 96.9 F | HEART RATE: 80 BPM | WEIGHT: 122 LBS | HEIGHT: 65 IN | DIASTOLIC BLOOD PRESSURE: 74 MMHG | BODY MASS INDEX: 20.33 KG/M2 | SYSTOLIC BLOOD PRESSURE: 110 MMHG | OXYGEN SATURATION: 94 %

## 2020-12-09 DIAGNOSIS — K56.2 VOLVULUS (HCC): ICD-10-CM

## 2020-12-09 DIAGNOSIS — K21.9 GASTROESOPHAGEAL REFLUX DISEASE WITHOUT ESOPHAGITIS: ICD-10-CM

## 2020-12-09 DIAGNOSIS — J43.9 PULMONARY EMPHYSEMA, UNSPECIFIED EMPHYSEMA TYPE (HCC): ICD-10-CM

## 2020-12-09 DIAGNOSIS — I10 ESSENTIAL HYPERTENSION: ICD-10-CM

## 2020-12-09 DIAGNOSIS — E87.6 HYPOKALEMIA: ICD-10-CM

## 2020-12-09 DIAGNOSIS — I48.0 PAROXYSMAL ATRIAL FIBRILLATION (HCC): ICD-10-CM

## 2020-12-09 DIAGNOSIS — D64.9 ANEMIA, UNSPECIFIED TYPE: ICD-10-CM

## 2020-12-09 DIAGNOSIS — E78.5 HYPERLIPIDEMIA, UNSPECIFIED HYPERLIPIDEMIA TYPE: Primary | ICD-10-CM

## 2020-12-09 DIAGNOSIS — F41.9 ANXIETY: ICD-10-CM

## 2020-12-09 DIAGNOSIS — Z72.0 TOBACCO USE: ICD-10-CM

## 2020-12-09 PROBLEM — R10.10 PAIN OF UPPER ABDOMEN: Status: RESOLVED | Noted: 2020-10-16 | Resolved: 2020-12-09

## 2020-12-09 PROCEDURE — 99214 OFFICE O/P EST MOD 30 MIN: CPT | Performed by: INTERNAL MEDICINE

## 2020-12-09 NOTE — PROGRESS NOTES
Subjective   Gabi Dudley is a 75 y.o. female.     Chief Complaint   Patient presents with   • Hypertension     1 mo f/u    • Hyperlipidemia       History of Present Illness   November 23 patient had intestinal volvulus surgery.  After surgery patient doing well.  Blood test showed hemoglobin is low and potassium is low.  Patient still has less than ideal appetite.  No nausea vomiting abdominal pain diarrhea constipation.  Atrial fibrillation stable on medication.  Anxiety on medication stable.  Emphysema stable on medication.  The GERD stable on medication.  Patient quit tobacco already for 3 weeks.  Hyperlipidemia stable on medication.  Blood pressure stable now    Current Outpatient Medications:   •  albuterol sulfate HFA (Ventolin HFA) 108 (90 Base) MCG/ACT inhaler, Inhale 2 puffs Every 4 (Four) Hours As Needed for Wheezing., Disp: 18 g, Rfl: 5  •  apixaban (ELIQUIS) 5 MG tablet tablet, Take 5 mg by mouth 2 (Two) Times a Day., Disp: , Rfl:   •  atorvastatin (LIPITOR) 20 MG tablet, TAKE 1 TABLET BY MOUTH ONCE DAILY, Disp: 90 tablet, Rfl: 3  •  Azelastine HCl 137 MCG/SPRAY solution, 1 spray into the nostril(s) as directed by provider 2 (Two) Times a Day., Disp: 30 mL, Rfl: 1  •  Cholecalciferol (vitamin D3) 125 MCG (5000 UT) capsule capsule, Take 5,000 Units by mouth Daily., Disp: , Rfl:   •  clonazePAM (KlonoPIN) 1 MG tablet, TAKE 1 TABLET BY MOUTH EVERY EVENING AS NEEDED FOR ANXIETY, Disp: 30 tablet, Rfl: 0  •  DULoxetine (CYMBALTA) 60 MG capsule, TAKE ONE CAPSULE BY MOUTH TWICE DAILY, Disp: 180 capsule, Rfl: 1  •  HYDROcodone-acetaminophen (NORCO)  MG per tablet, 4 (Four) Times a Day As Needed., Disp: , Rfl: 0  •  ipratropium-albuterol (DUO-NEB) 0.5-2.5 mg/3 ml nebulizer, Take 3 mL by nebulization Every 4 (Four) Hours As Needed for Wheezing., Disp: 360 mL, Rfl: 11  •  MAGNESIUM-ZINC PO, Take 1 tablet by mouth Daily., Disp: , Rfl:   •  oxybutynin XL (DITROPAN-XL) 10 MG 24 hr tablet, TAKE 1 TABLET BY  MOUTH DAILY, Disp: 90 tablet, Rfl: 1  •  pantoprazole (PROTONIX) 40 MG EC tablet, TAKE 1 TABLET BY MOUTH DAILY, Disp: 90 tablet, Rfl: 3  •  potassium chloride (KLOR-CON) 20 MEQ packet, Take 40 mEq by mouth Daily., Disp: 2 packet, Rfl: 0  •  sertraline (ZOLOFT) 100 MG tablet, TAKE 1 AND 1/2 TABLETS BY MOUTH DAILY, Disp: 135 tablet, Rfl: 1  •  traZODone (DESYREL) 100 MG tablet, Take 1 tablet by mouth Every Evening., Disp: 60 tablet, Rfl: 5  •  budesonide-formoterol (Symbicort) 160-4.5 MCG/ACT inhaler, Inhale 2 puffs 2 (Two) Times a Day., Disp: 1 inhaler, Rfl: 12    The following portions of the patient's history were reviewed and updated as appropriate: allergies, current medications, past family history, past medical history, past social history, past surgical history and problem list.    Review of Systems   Constitutional: Negative.    Respiratory: Negative.    Cardiovascular: Negative.    Gastrointestinal: Negative.    Musculoskeletal: Negative.    Skin: Negative.    Neurological: Negative.    Psychiatric/Behavioral: Negative.        Objective   Physical Exam  Neck:      Musculoskeletal: Neck supple.   Cardiovascular:      Rate and Rhythm: Normal rate and regular rhythm.      Heart sounds: Normal heart sounds.   Pulmonary:      Effort: Pulmonary effort is normal.      Breath sounds: Normal breath sounds.   Abdominal:      General: Bowel sounds are normal.   Skin:     General: Skin is warm.   Neurological:      Mental Status: She is alert and oriented to person, place, and time.         All tests have been reviewed.    Assessment/Plan   Diagnoses and all orders for this visit:    Hyperlipidemia, unspecified hyperlipidemia type continue medication    Essential hypertension stable without medication    Paroxysmal atrial fibrillation (CMS/HCC) continue medication    Anxiety continue medication as needed    Anemia, unspecified type repeat blood test  -     CBC & Differential    Pulmonary emphysema, unspecified emphysema  type (CMS/HCC) continue medication    Gastroesophageal reflux disease without esophagitis continue medication    Volvulus (CMS/HCC) status post a surgery and doing well    Tobacco use quit for 3 weeks already    Hypokalemia increase po and ensure.  Repeat today potassium only  -     Basic Metabolic Panel    1 mo after labs             ----historical data below  S/p cholecystectomy   tob, CT lung 8/2020, lung nodule, scarring and emphysema.   anxiety continue zoloft 150 mg daily.   and klonopin 1mg, 1/2 tab po prn , again emphasized as needed basis.  Continue cymbalta 60 bid.  Patient refused to take BuSpar or see psychiatrist.  CT scan showed liver cyst, large kidney cyst, adrenal gland adenoma, kidney stones non-obstructing. repeat showed no change 7/2020,benign per radiologist  right knee OA on xray aleve prn knee brace help  hip pain s/p steroid helped improved  Osteoporosis continue medication, refuse dexa   heme+, Colon polyps repeat 6/2019. EGD done  COPD continue medication. need to Quit tobacco. tudorsa, proair, continue O2 prn, symbicort refill nebulizer  Fibromyalgia stable watch continue medicine and follow up with pain clinic,with lortab  Tension HA continue flexeril and naproxen  sinus polyp per dentist, obtain rec.  ref to ENT.patient refuses, azelastine and Claritin D 5 mg in the morning  Nipple retraction mamm and US:negative pathology  Cysts in liver and kidney, watch  Kidney lesion renal image CT renal protocol cyst and stone non obstructing  A.fib and pericardia eff, follow cardio patient to schedule  Liver cyst on CT watch  Right neck SK watch for now, patient to call if increase in size or change in color  Colon 7/24/19, repeat in 3 years

## 2020-12-10 LAB — POTASSIUM SERPL-SCNC: 5 MMOL/L (ref 3.5–5.2)

## 2020-12-14 RX ORDER — CLONAZEPAM 1 MG/1
TABLET ORAL
Qty: 60 TABLET | Refills: 1 | Status: SHIPPED | OUTPATIENT
Start: 2020-12-14 | End: 2021-05-12

## 2020-12-14 RX ORDER — ALBUTEROL SULFATE 90 UG/1
2 AEROSOL, METERED RESPIRATORY (INHALATION) EVERY 4 HOURS PRN
Qty: 18 G | Refills: 5 | Status: SHIPPED | OUTPATIENT
Start: 2020-12-14 | End: 2021-12-14

## 2020-12-14 NOTE — TELEPHONE ENCOUNTER
Caller: [facility] #[id number] - [place] - [email or web address] [email or web address] [place] [address] - [phone] PH - [phone] FX    Relationship: Pharmacy    Best call back number: [phone]    Medication needed:   Requested Prescriptions     Pending Prescriptions Disp Refills   • albuterol sulfate HFA (Ventolin HFA) 108 (90 Base) MCG/ACT inhaler 18 g 5     Sig: Inhale 2 puffs Every 4 (Four) Hours As Needed for Wheezing.   • clonazePAM (KlonoPIN) 1 MG tablet 30 tablet 0       When do you need the refill by: ASAP    What details did the patient provide when requesting the medication: PHARMACY CALLED AND REQUESTED REFILLS ON ABOVE MEDICATION    Does the patient have less than a 3 day supply:  [x] Yes  [] No    What is the patient's preferred pharmacy: [facility] #[id number] - [place], [place] - [address] [place] AT [facility] [email or web address] [place] [address] - [phone] PH - [phone] FX

## 2021-01-08 LAB
BASOPHILS # BLD AUTO: 0.06 10*3/MM3 (ref 0–0.2)
BASOPHILS NFR BLD AUTO: 0.8 % (ref 0–1.5)
BUN SERPL-MCNC: 22 MG/DL (ref 8–23)
BUN/CREAT SERPL: 30.1 (ref 7–25)
CALCIUM SERPL-MCNC: 9.1 MG/DL (ref 8.6–10.5)
CHLORIDE SERPL-SCNC: 101 MMOL/L (ref 98–107)
CO2 SERPL-SCNC: 29.8 MMOL/L (ref 22–29)
CREAT SERPL-MCNC: 0.73 MG/DL (ref 0.57–1)
EOSINOPHIL # BLD AUTO: 0.1 10*3/MM3 (ref 0–0.4)
EOSINOPHIL NFR BLD AUTO: 1.3 % (ref 0.3–6.2)
ERYTHROCYTE [DISTWIDTH] IN BLOOD BY AUTOMATED COUNT: 14.8 % (ref 12.3–15.4)
GLUCOSE SERPL-MCNC: 102 MG/DL (ref 65–99)
HCT VFR BLD AUTO: 36.6 % (ref 34–46.6)
HGB BLD-MCNC: 11.6 G/DL (ref 12–15.9)
IMM GRANULOCYTES # BLD AUTO: 0.02 10*3/MM3 (ref 0–0.05)
IMM GRANULOCYTES NFR BLD AUTO: 0.3 % (ref 0–0.5)
LYMPHOCYTES # BLD AUTO: 1.77 10*3/MM3 (ref 0.7–3.1)
LYMPHOCYTES NFR BLD AUTO: 23.3 % (ref 19.6–45.3)
MCH RBC QN AUTO: 26.4 PG (ref 26.6–33)
MCHC RBC AUTO-ENTMCNC: 31.7 G/DL (ref 31.5–35.7)
MCV RBC AUTO: 83.2 FL (ref 79–97)
MONOCYTES # BLD AUTO: 0.54 10*3/MM3 (ref 0.1–0.9)
MONOCYTES NFR BLD AUTO: 7.1 % (ref 5–12)
NEUTROPHILS # BLD AUTO: 5.12 10*3/MM3 (ref 1.7–7)
NEUTROPHILS NFR BLD AUTO: 67.2 % (ref 42.7–76)
NRBC BLD AUTO-RTO: 0 /100 WBC (ref 0–0.2)
PLATELET # BLD AUTO: 282 10*3/MM3 (ref 140–450)
POTASSIUM SERPL-SCNC: 4.3 MMOL/L (ref 3.5–5.2)
RBC # BLD AUTO: 4.4 10*6/MM3 (ref 3.77–5.28)
SODIUM SERPL-SCNC: 138 MMOL/L (ref 136–145)
WBC # BLD AUTO: 7.61 10*3/MM3 (ref 3.4–10.8)

## 2021-01-11 ENCOUNTER — OFFICE VISIT (OUTPATIENT)
Dept: INTERNAL MEDICINE | Facility: CLINIC | Age: 76
End: 2021-01-11

## 2021-01-11 VITALS
TEMPERATURE: 96.8 F | HEIGHT: 65 IN | HEART RATE: 87 BPM | OXYGEN SATURATION: 93 % | SYSTOLIC BLOOD PRESSURE: 102 MMHG | DIASTOLIC BLOOD PRESSURE: 66 MMHG | BODY MASS INDEX: 22.33 KG/M2 | WEIGHT: 134 LBS

## 2021-01-11 DIAGNOSIS — K21.9 GASTROESOPHAGEAL REFLUX DISEASE WITHOUT ESOPHAGITIS: ICD-10-CM

## 2021-01-11 DIAGNOSIS — F41.9 ANXIETY: ICD-10-CM

## 2021-01-11 DIAGNOSIS — I10 ESSENTIAL HYPERTENSION: ICD-10-CM

## 2021-01-11 DIAGNOSIS — I48.0 PAROXYSMAL ATRIAL FIBRILLATION (HCC): ICD-10-CM

## 2021-01-11 DIAGNOSIS — G47.00 INSOMNIA, UNSPECIFIED TYPE: ICD-10-CM

## 2021-01-11 DIAGNOSIS — E78.5 HYPERLIPIDEMIA, UNSPECIFIED HYPERLIPIDEMIA TYPE: ICD-10-CM

## 2021-01-11 DIAGNOSIS — D64.9 ANEMIA, UNSPECIFIED TYPE: Primary | ICD-10-CM

## 2021-01-11 PROBLEM — I31.39 PERICARDIAL EFFUSION: Status: RESOLVED | Noted: 2018-02-09 | Resolved: 2021-01-11

## 2021-01-11 PROBLEM — K56.2 VOLVULUS: Status: RESOLVED | Noted: 2020-11-23 | Resolved: 2021-01-11

## 2021-01-11 PROBLEM — Z72.0 TOBACCO USE: Status: RESOLVED | Noted: 2019-11-20 | Resolved: 2021-01-11

## 2021-01-11 PROCEDURE — 99214 OFFICE O/P EST MOD 30 MIN: CPT | Performed by: INTERNAL MEDICINE

## 2021-01-11 NOTE — PROGRESS NOTES
Subjective   Gabi Dudley is a 75 y.o. female.     Chief Complaint   Patient presents with   • Hyperlipidemia     1 mo f/u    • Hypertension       History of Present Illness   Patient here for follow-up.  Anemia improved after iron p.o.  Anxiety slightly worse due to recent political situation.  Atrial fibrillation stable on medication.  Hypertension stable on medication.  Hyperlipidemia stable on medication.  GERD stable on medication.  Insomnia improved on medication.    Current Outpatient Medications:   •  albuterol sulfate HFA (Ventolin HFA) 108 (90 Base) MCG/ACT inhaler, Inhale 2 puffs Every 4 (Four) Hours As Needed for Wheezing., Disp: 18 g, Rfl: 5  •  apixaban (ELIQUIS) 5 MG tablet tablet, Take 5 mg by mouth 2 (Two) Times a Day., Disp: , Rfl:   •  atorvastatin (LIPITOR) 20 MG tablet, TAKE 1 TABLET BY MOUTH ONCE DAILY, Disp: 90 tablet, Rfl: 3  •  Azelastine HCl 137 MCG/SPRAY solution, 1 spray into the nostril(s) as directed by provider 2 (Two) Times a Day., Disp: 30 mL, Rfl: 1  •  Cholecalciferol (vitamin D3) 125 MCG (5000 UT) capsule capsule, Take 5,000 Units by mouth Daily., Disp: , Rfl:   •  clonazePAM (KlonoPIN) 1 MG tablet, 1 po prn daily, Disp: 60 tablet, Rfl: 1  •  DULoxetine (CYMBALTA) 60 MG capsule, TAKE ONE CAPSULE BY MOUTH TWICE DAILY, Disp: 180 capsule, Rfl: 1  •  HYDROcodone-acetaminophen (NORCO)  MG per tablet, 4 (Four) Times a Day As Needed., Disp: , Rfl: 0  •  ipratropium-albuterol (DUO-NEB) 0.5-2.5 mg/3 ml nebulizer, Take 3 mL by nebulization Every 4 (Four) Hours As Needed for Wheezing., Disp: 360 mL, Rfl: 11  •  MAGNESIUM-ZINC PO, Take 1 tablet by mouth Daily., Disp: , Rfl:   •  oxybutynin XL (DITROPAN-XL) 10 MG 24 hr tablet, TAKE 1 TABLET BY MOUTH DAILY, Disp: 90 tablet, Rfl: 1  •  pantoprazole (PROTONIX) 40 MG EC tablet, TAKE 1 TABLET BY MOUTH DAILY, Disp: 90 tablet, Rfl: 3  •  potassium chloride (KLOR-CON) 20 MEQ packet, Take 40 mEq by mouth Daily., Disp: 2 packet, Rfl: 0  •   sertraline (ZOLOFT) 100 MG tablet, TAKE 1 AND 1/2 TABLETS BY MOUTH DAILY, Disp: 135 tablet, Rfl: 1  •  traZODone (DESYREL) 100 MG tablet, Take 1 tablet by mouth Every Evening., Disp: 60 tablet, Rfl: 5  •  budesonide-formoterol (Symbicort) 160-4.5 MCG/ACT inhaler, Inhale 2 puffs 2 (Two) Times a Day., Disp: 1 inhaler, Rfl: 12    The following portions of the patient's history were reviewed and updated as appropriate: allergies, current medications, past family history, past medical history, past social history, past surgical history and problem list.    Review of Systems   Constitutional: Negative.    Respiratory: Negative.    Cardiovascular: Negative.    Gastrointestinal: Negative.    Musculoskeletal: Negative.    Skin: Negative.    Neurological: Negative.    Psychiatric/Behavioral: Negative.        Objective   Physical Exam  Neck:      Musculoskeletal: Neck supple.   Cardiovascular:      Rate and Rhythm: Normal rate and regular rhythm.      Heart sounds: Normal heart sounds.   Pulmonary:      Effort: Pulmonary effort is normal.      Breath sounds: Normal breath sounds.   Abdominal:      General: Bowel sounds are normal.   Skin:     General: Skin is warm.   Neurological:      Mental Status: She is alert and oriented to person, place, and time.         All tests have been reviewed.    Assessment/Plan   Diagnoses and all orders for this visit:    Anemia, improved after iron pill continue    Anxiety cont med patient declined to have more medication.  Counseling today.    Paroxysmal atrial fibrillation (CMS/HCC) cont med    Essential hypertension cont med    Hyperlipidemia, cont med    Gastroesophageal reflux disease without esophagitis cont med    Insomnia, cont med    5 mo wellness              ----historical data below  S/p cholecystectomy   tob, quit already. CT lung 8/2020, lung nodule, scarring and emphysema.   anxiety continue zoloft 150 mg daily.   and klonopin 1mg, 1/2 tab po prn , again emphasized as needed  basis.  Continue cymbalta 60 bid.  Patient refused to take BuSpar or see psychiatrist.  CT scan showed liver cyst, large kidney cyst, adrenal gland adenoma, kidney stones non-obstructing. repeat showed no change 7/2020,benign per radiologist  right knee OA on xray aleve prn knee brace help  hip pain s/p steroid helped improved  Osteoporosis continue medication, refuse dexa   heme+, Colon polyps repeat 6/2019. EGD done  COPD continue medication. need to Quit tobacco. tudorsa, proair, continue O2 prn, symbicort refill nebulizer  Fibromyalgia stable watch continue medicine and follow up with pain clinic,with lortab  Tension HA continue flexeril and naproxen  sinus polyp per dentist, obtain rec.  ref to ENT.patient refuses, azelastine and Claritin D 5 mg in the morning  Nipple retraction mamm and US:negative pathology  Cysts in liver and kidney, watch  Kidney lesion renal image CT renal protocol cyst and stone non obstructing  A.fib and pericardia eff, follow cardio patient to schedule  Liver cyst on CT watch  Right neck SK watch for now, patient to call if increase in size or change in color  Colon 7/24/19, repeat in 3 years

## 2021-02-22 RX ORDER — DULOXETIN HYDROCHLORIDE 60 MG/1
CAPSULE, DELAYED RELEASE ORAL
Qty: 180 CAPSULE | Refills: 1 | Status: SHIPPED | OUTPATIENT
Start: 2021-02-22 | End: 2021-08-09

## 2021-03-06 RX ORDER — TRAZODONE HYDROCHLORIDE 100 MG/1
100 TABLET ORAL EVERY EVENING
Qty: 90 TABLET | Refills: 3 | Status: SHIPPED | OUTPATIENT
Start: 2021-03-06 | End: 2022-02-17 | Stop reason: SDUPTHER

## 2021-03-11 ENCOUNTER — OFFICE VISIT (OUTPATIENT)
Dept: CARDIOLOGY | Facility: CLINIC | Age: 76
End: 2021-03-11

## 2021-03-11 VITALS
OXYGEN SATURATION: 96 % | WEIGHT: 138.6 LBS | BODY MASS INDEX: 23.09 KG/M2 | DIASTOLIC BLOOD PRESSURE: 68 MMHG | SYSTOLIC BLOOD PRESSURE: 92 MMHG | RESPIRATION RATE: 18 BRPM | HEIGHT: 65 IN | HEART RATE: 88 BPM

## 2021-03-11 DIAGNOSIS — I10 ESSENTIAL HYPERTENSION: ICD-10-CM

## 2021-03-11 DIAGNOSIS — I48.0 PAROXYSMAL ATRIAL FIBRILLATION (HCC): ICD-10-CM

## 2021-03-11 DIAGNOSIS — I25.10 CORONARY ARTERY DISEASE INVOLVING NATIVE CORONARY ARTERY OF NATIVE HEART WITHOUT ANGINA PECTORIS: Primary | ICD-10-CM

## 2021-03-11 PROCEDURE — 93000 ELECTROCARDIOGRAM COMPLETE: CPT | Performed by: NURSE PRACTITIONER

## 2021-03-11 PROCEDURE — 99213 OFFICE O/P EST LOW 20 MIN: CPT | Performed by: NURSE PRACTITIONER

## 2021-03-11 NOTE — PROGRESS NOTES
"             [facility] Cardiology Office Follow Up Note    Gabi Dudley  [id number]  2021    Primary Care Provider: Paul Quinteros MD   Referring Provider: No ref. provider found    Chief Complaint: Routine follow-up    History of Present Illness:   Mrs. Gabi Dudley is a 76 y.o. female who presents to the Cardiology Clinic for routine follow-up.  The patient has a past medical history significant for hypertension, dyslipidemia, prior pulmonary embolism, fibromyalgia, anxiety, and chronic tobacco use complicated by COPD.  Her past cardiac history is significant for atrial fibrillation, with an ECG in  demonstrating atrial fibrillation with a ventricular rate of 118 bpm. The patient also has a history of nonobstructive coronary artery disease, reportedly diagnosed on remote coronary angiography.  She presents the cardiology clinic today for regular follow-up.  Since her last appointment, the patient reports that she had removal of her gallbladder last November with subsequent bowel resection due to a \"blockage\" in her intestines.  Patient states she spent the majority of the holidays in recovery, but adds that she feels much better now.  She denies chest pain, lower extremity edema, palpitations, and syncope.  She complains of shortness of breath today, but attributes this to her COPD.  She is concerned because cannot afford her Symbicort inhaler.  She is maintaining all of her other medications without perceived side effects.  She denies any blood or bleeding problems secondary to Eliquis.  No other complaints or concerns at this time.      Past Cardiac Testin. Last Coronary Angio:  , reportedly nonobstructive disease.  Report unavailable  2. Prior Stress Testing: Remote, with report unavailable  3. Last Echo: 2020              1.  Normal left ventricular size with low normal LV systolic function, LVEF 50-55%.              2.  Mild concentric LVH.              3.  Normal " "LV diastolic filling pattern.              4.  Mild right ventricular dilation with normal RV systolic function.              5.  Normal left and right atrial size.              6.  Trace MR and TR.  4. Prior Holter Monitor: 48-hour Holter 6/16/2020              1.  The predominant rhythm is sinus rhythm with an average heart rate 90 bpm.              2.  Normal atrioventricular conduction.              3.  No sustained supraventricular or ventricular arrhythmias.              4.  No episodes of paroxysmal atrial fibrillation.              5.  Rare supraventricular ectopy with isolated PACs, burden <0.1%.              6.  Rare ventricular ectopy with isolated and pairs of PVCs, burden <0.1%.              7.  One symptomatic episode of \"anxiety, chest pain, shortness of air\" corresponded to NSR at 91 bpm.    Review of Systems:   Review of Systems   Constitutional: Negative for activity change, chills, diaphoresis, fatigue, fever and unexpected weight gain.   Eyes: Negative for blurred vision and visual disturbance.   Respiratory: Negative for apnea, cough, chest tightness, shortness of breath and wheezing.    Cardiovascular: Negative for chest pain, palpitations and leg swelling.   Gastrointestinal: Negative for abdominal distention, blood in stool, GERD and indigestion.   Endocrine: Negative for cold intolerance and heat intolerance.   Genitourinary: Negative for hematuria.   Musculoskeletal: Negative for gait problem, joint swelling and myalgias.   Skin: Negative for color change, pallor and bruise.   Neurological: Negative for dizziness, seizures, syncope, weakness, light-headedness, numbness, headache and confusion.   Hematological: Does not bruise/bleed easily.   Psychiatric/Behavioral: Negative for behavioral problems, sleep disturbance, suicidal ideas and depressed mood.     I have reviewed and confirmed the accuracy of the ROS as documented by the MA/LPN/RN REGINA Harrington    I have reviewed and/or " updated the patient's past medical, past surgical, family, social history, problem list and allergies as appropriate.     Medications:     Current Outpatient Medications:   •  albuterol sulfate HFA (Ventolin HFA) 108 (90 Base) MCG/ACT inhaler, Inhale 2 puffs Every 4 (Four) Hours As Needed for Wheezing., Disp: 18 g, Rfl: 5  •  apixaban (ELIQUIS) 5 MG tablet tablet, Take 1 tablet by mouth Every 12 (Twelve) Hours., Disp: 60 tablet, Rfl: 0  •  atorvastatin (LIPITOR) 20 MG tablet, TAKE 1 TABLET BY MOUTH ONCE DAILY, Disp: 90 tablet, Rfl: 3  •  Azelastine HCl 137 MCG/SPRAY solution, 1 spray into the nostril(s) as directed by provider 2 (Two) Times a Day., Disp: 30 mL, Rfl: 1  •  Cholecalciferol (vitamin D3) 125 MCG (5000 UT) capsule capsule, Take 5,000 Units by mouth Daily., Disp: , Rfl:   •  clonazePAM (KlonoPIN) 1 MG tablet, 1 po prn daily, Disp: 60 tablet, Rfl: 1  •  DULoxetine (CYMBALTA) 60 MG capsule, TAKE ONE CAPSULE BY MOUTH TWICE DAILY, Disp: 180 capsule, Rfl: 1  •  HYDROcodone-acetaminophen (NORCO)  MG per tablet, 4 (Four) Times a Day As Needed., Disp: , Rfl: 0  •  ipratropium-albuterol (DUO-NEB) 0.5-2.5 mg/3 ml nebulizer, Take 3 mL by nebulization Every 4 (Four) Hours As Needed for Wheezing., Disp: 360 mL, Rfl: 11  •  oxybutynin XL (DITROPAN-XL) 10 MG 24 hr tablet, TAKE 1 TABLET BY MOUTH DAILY, Disp: 90 tablet, Rfl: 1  •  pantoprazole (PROTONIX) 40 MG EC tablet, TAKE 1 TABLET BY MOUTH DAILY, Disp: 90 tablet, Rfl: 3  •  sertraline (ZOLOFT) 100 MG tablet, TAKE 1 AND 1/2 TABLETS BY MOUTH DAILY, Disp: 135 tablet, Rfl: 1  •  traZODone (DESYREL) 100 MG tablet, TAKE 1 TABLET BY MOUTH EVERY EVENING, Disp: 90 tablet, Rfl: 3  •  budesonide-formoterol (Symbicort) 160-4.5 MCG/ACT inhaler, Inhale 2 puffs 2 (Two) Times a Day., Disp: 1 inhaler, Rfl: 12  •  MAGNESIUM-ZINC PO, Take 1 tablet by mouth Daily., Disp: , Rfl:   •  potassium chloride (KLOR-CON) 20 MEQ packet, Take 40 mEq by mouth Daily., Disp: 2 packet, Rfl:  "0    Physical Exam:  Vital Signs:   Vitals:    03/11/21 1533   BP: 92/68   BP Location: Left arm   Patient Position: Sitting   Cuff Size: Adult   Pulse: 88   Resp: 18   SpO2: 96%   Weight: 62.9 kg (138 lb 9.6 oz)   Height: 165.1 cm (65\")     Body mass index is 23.06 kg/m².    Physical Exam  Vitals and nursing note reviewed.   Constitutional:       General: She is not in acute distress.     Appearance: Normal appearance. She is well-developed.   HENT:      Head: Normocephalic and atraumatic.      Mouth/Throat:      Mouth: Mucous membranes are moist.   Eyes:      General: No scleral icterus.     Extraocular Movements: Extraocular movements intact.   Neck:      Trachea: Trachea normal.   Cardiovascular:      Rate and Rhythm: Normal rate and regular rhythm.      Pulses: Normal pulses.      Heart sounds: Normal heart sounds. No murmur. No friction rub. No gallop.    Pulmonary:      Effort: Pulmonary effort is normal.      Breath sounds: Normal breath sounds.   Abdominal:      Palpations: Abdomen is soft.      Tenderness: There is no abdominal tenderness.   Musculoskeletal:         General: Normal range of motion.      Cervical back: Neck supple.      Right lower leg: No edema.      Left lower leg: No edema.   Skin:     General: Skin is warm and dry.      Findings: No bruising, lesion or rash.   Neurological:      Mental Status: She is alert and oriented to person, place, and time.      Motor: No weakness.      Gait: Gait normal.   Psychiatric:         Mood and Affect: Mood normal.         Behavior: Behavior normal. Behavior is cooperative.         Thought Content: Thought content does not include suicidal ideation.         Results Review:   I reviewed the patient's new clinical results.      ECG 12 Lead    Date/Time: 3/18/2021 3:58 PM  Performed by: Britt Farrar APRN  Authorized by: Britt Farrra APRN   Comparison: compared with previous ECG     Clinical impression: normal ECG            Assessment / " Plan:   Diagnoses and all orders for this visit:    1. Coronary artery disease involving native coronary artery of native heart without angina pectoris (Primary)  - Stable and angina free    2. Paroxysmal atrial fibrillation (CMS/HCC)  - Maintained on Eliquis for CVA/embolic prophylaxis    3. Essential hypertension  - Excellent blood pressure control        Preventative Cardiology:   Tobacco Cessation: N/A   Obstructive Sleep Apnea Screening: N/A  AAA Screening: N/A  Peripheral Arterial Disease Screening: N/A  Advance Care Planning: ACP discussion was declined by the patient. Patient does not have an advance directive, declines further assistance.     Follow Up:   Return in about 6 months (around 9/11/2021) for Follow-up with Dr. Curry.      Thank you for allowing me to participate in the care of your patient. Please to not hesitate to contact me with additional questions or concerns.     REGINA Flower

## 2021-04-04 DIAGNOSIS — I48.0 PAROXYSMAL ATRIAL FIBRILLATION (HCC): Primary | ICD-10-CM

## 2021-04-05 RX ORDER — APIXABAN 5 MG/1
TABLET, FILM COATED ORAL
Qty: 60 TABLET | Refills: 4 | Status: SHIPPED | OUTPATIENT
Start: 2021-04-05 | End: 2021-05-12 | Stop reason: SDUPTHER

## 2021-05-12 ENCOUNTER — TELEPHONE (OUTPATIENT)
Dept: CARDIOLOGY | Facility: CLINIC | Age: 76
End: 2021-05-12

## 2021-05-12 DIAGNOSIS — I48.0 PAROXYSMAL ATRIAL FIBRILLATION (HCC): ICD-10-CM

## 2021-05-12 NOTE — TELEPHONE ENCOUNTER
Last office visit  01/11/2021  Next scheduled appointment with extender 05/13/2021    No controlled substance agreement on file  No UDS in chart.

## 2021-05-12 NOTE — TELEPHONE ENCOUNTER
Ms. Aguilar called states Eliquis will be $329.34 @ [facility] states $282 is going toward deductible. [employer] is not offering a lower Tier for this medication per the Cover MyMeds site. I explained to the daughter after the deductible is met then the cost should be $47.00 per month.    Daughter will come  samples since her mother can not afford the deductible.

## 2021-05-14 RX ORDER — CLONAZEPAM 1 MG/1
TABLET ORAL
Qty: 30 TABLET | Refills: 0 | Status: SHIPPED | OUTPATIENT
Start: 2021-05-14 | End: 2021-07-18

## 2021-05-14 RX ORDER — SERTRALINE HYDROCHLORIDE 100 MG/1
TABLET, FILM COATED ORAL
Qty: 180 TABLET | Refills: 3 | Status: SHIPPED | OUTPATIENT
Start: 2021-05-14 | End: 2022-05-26

## 2021-06-25 ENCOUNTER — TRANSCRIBE ORDERS (OUTPATIENT)
Dept: ADMINISTRATIVE | Facility: HOSPITAL | Age: 76
End: 2021-06-25

## 2021-06-25 ENCOUNTER — HOSPITAL ENCOUNTER (OUTPATIENT)
Dept: GENERAL RADIOLOGY | Facility: HOSPITAL | Age: 76
Discharge: HOME | End: 2021-06-25
Admitting: PAIN MEDICINE

## 2021-06-25 DIAGNOSIS — M25.562 PAIN IN BOTH KNEES, UNSPECIFIED CHRONICITY: Primary | ICD-10-CM

## 2021-06-25 DIAGNOSIS — M25.561 PAIN IN BOTH KNEES, UNSPECIFIED CHRONICITY: Primary | ICD-10-CM

## 2021-06-25 PROCEDURE — 73560 X-RAY EXAM OF KNEE 1 OR 2: CPT

## 2021-06-28 ENCOUNTER — OFFICE VISIT (OUTPATIENT)
Dept: INTERNAL MEDICINE | Facility: CLINIC | Age: 76
End: 2021-06-28

## 2021-06-28 VITALS
WEIGHT: 148 LBS | SYSTOLIC BLOOD PRESSURE: 142 MMHG | HEART RATE: 94 BPM | DIASTOLIC BLOOD PRESSURE: 90 MMHG | OXYGEN SATURATION: 94 % | HEIGHT: 65 IN | BODY MASS INDEX: 24.66 KG/M2 | TEMPERATURE: 95.7 F

## 2021-06-28 DIAGNOSIS — E55.9 VITAMIN D DEFICIENCY: ICD-10-CM

## 2021-06-28 DIAGNOSIS — Z00.00 WELLNESS EXAMINATION: ICD-10-CM

## 2021-06-28 DIAGNOSIS — E55.9 VITAMIN D DEFICIENCY, UNSPECIFIED: ICD-10-CM

## 2021-06-28 DIAGNOSIS — M81.0 OSTEOPOROSIS, UNSPECIFIED OSTEOPOROSIS TYPE, UNSPECIFIED PATHOLOGICAL FRACTURE PRESENCE: ICD-10-CM

## 2021-06-28 DIAGNOSIS — E78.5 HYPERLIPIDEMIA, UNSPECIFIED HYPERLIPIDEMIA TYPE: ICD-10-CM

## 2021-06-28 DIAGNOSIS — M79.7 FIBROMYALGIA: ICD-10-CM

## 2021-06-28 DIAGNOSIS — I10 ESSENTIAL HYPERTENSION: ICD-10-CM

## 2021-06-28 DIAGNOSIS — N39.41 URGE INCONTINENCE: ICD-10-CM

## 2021-06-28 DIAGNOSIS — K57.90 DIVERTICULOSIS: ICD-10-CM

## 2021-06-28 DIAGNOSIS — Z87.891 PERSONAL HISTORY OF TOBACCO USE, PRESENTING HAZARDS TO HEALTH: ICD-10-CM

## 2021-06-28 DIAGNOSIS — M17.9 OSTEOARTHRITIS OF KNEE, UNSPECIFIED LATERALITY, UNSPECIFIED OSTEOARTHRITIS TYPE: ICD-10-CM

## 2021-06-28 DIAGNOSIS — K80.20 CALCULUS OF GALLBLADDER WITHOUT CHOLECYSTITIS WITHOUT OBSTRUCTION: ICD-10-CM

## 2021-06-28 DIAGNOSIS — D35.00 ADRENAL ADENOMA, UNSPECIFIED LATERALITY: ICD-10-CM

## 2021-06-28 DIAGNOSIS — L08.1 ERYTHRASMA: ICD-10-CM

## 2021-06-28 DIAGNOSIS — K21.9 GASTROESOPHAGEAL REFLUX DISEASE WITHOUT ESOPHAGITIS: ICD-10-CM

## 2021-06-28 DIAGNOSIS — D64.9 ANEMIA, UNSPECIFIED TYPE: ICD-10-CM

## 2021-06-28 DIAGNOSIS — I48.0 PAROXYSMAL ATRIAL FIBRILLATION (HCC): Primary | ICD-10-CM

## 2021-06-28 DIAGNOSIS — N28.1 SIMPLE RENAL CYST: ICD-10-CM

## 2021-06-28 DIAGNOSIS — F41.9 ANXIETY: ICD-10-CM

## 2021-06-28 DIAGNOSIS — I25.10 CORONARY ARTERY DISEASE INVOLVING NATIVE CORONARY ARTERY OF NATIVE HEART WITHOUT ANGINA PECTORIS: ICD-10-CM

## 2021-06-28 PROBLEM — R32 URINARY INCONTINENCE: Status: RESOLVED | Noted: 2020-07-16 | Resolved: 2021-06-28

## 2021-06-28 PROBLEM — K80.21 CALCULUS OF GALLBLADDER WITH BILIARY OBSTRUCTION BUT WITHOUT CHOLECYSTITIS: Status: RESOLVED | Noted: 2020-07-16 | Resolved: 2021-06-28

## 2021-06-28 PROCEDURE — 99214 OFFICE O/P EST MOD 30 MIN: CPT | Performed by: INTERNAL MEDICINE

## 2021-06-28 PROCEDURE — G0439 PPPS, SUBSEQ VISIT: HCPCS | Performed by: INTERNAL MEDICINE

## 2021-06-28 RX ORDER — CLOTRIMAZOLE AND BETAMETHASONE DIPROPIONATE 10; .64 MG/G; MG/G
CREAM TOPICAL 2 TIMES DAILY
Qty: 45 G | Refills: 1 | Status: SHIPPED | OUTPATIENT
Start: 2021-06-28 | End: 2021-10-12 | Stop reason: SDUPTHER

## 2021-06-28 RX ORDER — HYDROCODONE BITARTRATE AND ACETAMINOPHEN 10; 325 MG/1; MG/1
1 TABLET ORAL 4 TIMES DAILY PRN
COMMUNITY
Start: 2021-06-18 | End: 2022-11-24 | Stop reason: HOSPADM

## 2021-06-28 NOTE — PROGRESS NOTES
The ABCs of the Annual Wellness Visit  Subsequent Medicare Wellness Visit    No chief complaint on file.      Subjective   History of Present Illness:  Gabi Dudley is a 76 y.o. female who presents for a Subsequent Medicare Wellness Visit.  Patient here for Medicare wellness also has medical problems to be discussed.  Patient complains rash under both breasts erythematous itchy over-the-counter measure no help its been there for several weeks.  No fever chills.  Patient still has back pain fibromyalgia on medication helping some.  Atrial fibrillation stable on medication.  Blood pressure on the low side no dizziness no passing out.  Hyperlipidemia stable on medication.  GERD stable.  Hemoglobin slightly decreased.  Last the CT scan showed adrenal gland normal.  Anxiety stable on medication.  Patient also has a history of osteoporosis.  HEALTH RISK ASSESSMENT    Recent Hospitalizations:  No hospitalization(s) within the last year.    Current Medical Providers:  Patient Care Team:  Paul Quinteros MD as PCP - General (Internal Medicine)  Sima Moses MD as Consulting Physician (General Surgery)  Taiwo Curry MD as Consulting Physician (Cardiology)    Smoking Status:  Social History     Tobacco Use   Smoking Status Former Smoker   • Packs/day: 0.25   • Years: 30.00   • Pack years: 7.50   • Types: Cigarettes   • Quit date: 2020   • Years since quittin.6   Smokeless Tobacco Never Used       Alcohol Consumption:  Social History     Substance and Sexual Activity   Alcohol Use No       Depression Screen:   PHQ-2/PHQ-9 Depression Screening 2021   Little interest or pleasure in doing things 0   Feeling down, depressed, or hopeless 0   Trouble falling or staying asleep, or sleeping too much -   Feeling tired or having little energy -   Poor appetite or overeating -   Feeling bad about yourself - or that you are a failure or have let yourself or your family down -   Trouble concentrating on things,  such as reading the newspaper or watching television -   Moving or speaking so slowly that other people could have noticed. Or the opposite - being so fidgety or restless that you have been moving around a lot more than usual -   Thoughts that you would be better off dead, or of hurting yourself in some way -   Total Score 0   If you checked off any problems, how difficult have these problems made it for you to do your work, take care of things at home, or get along with other people? -       Fall Risk Screen:  LAURE Fall Risk Assessment has not been completed.    Health Habits and Functional and Cognitive Screening:  Functional & Cognitive Status 6/25/2020   Do you have difficulty preparing food and eating? No   Do you have difficulty bathing yourself, getting dressed or grooming yourself? No   Do you have difficulty using the toilet? No   Do you have difficulty moving around from place to place? No   Do you have trouble with steps or getting out of a bed or a chair? No   Current Diet Well Balanced Diet   Dental Exam Up to date   Eye Exam Up to date   Exercise (times per week) 3 times per week   Current Exercise Activities Include Walking   Do you need help using the phone?  No   Are you deaf or do you have serious difficulty hearing?  No   Do you need help with transportation? No   Do you need help shopping? No   Do you need help preparing meals?  No   Do you need help with housework?  No   Do you need help with laundry? No   Do you need help taking your medications? No   Do you need help managing money? No   Do you ever drive or ride in a car without wearing a seat belt? No   Have you felt unusual stress, anger or loneliness in the last month? Yes   Who do you live with? Child   If you need help, do you have trouble finding someone available to you? No   Have you been bothered in the last four weeks by sexual problems? No   Do you have difficulty concentrating, remembering or making decisions? No         Does  the patient have evidence of cognitive impairment? No    Asprin use counseling:Taking ASA appropriately as indicated    Age-appropriate Screening Schedule:  Refer to the list below for future screening recommendations based on patient's age, sex and/or medical conditions. Orders for these recommended tests are listed in the plan section. The patient has been provided with a written plan.    Health Maintenance   Topic Date Due   • ZOSTER VACCINE (2 of 3) 06/10/2016   • TDAP/TD VACCINES (3 - Td or Tdap) 07/25/2020   • MAMMOGRAM  07/03/2021   • INFLUENZA VACCINE  08/01/2021   • LIPID PANEL  10/27/2021   • DXA SCAN  Discontinued          The following portions of the patient's history were reviewed and updated as appropriate: allergies, current medications, past family history, past medical history, past social history, past surgical history and problem list.    Outpatient Medications Prior to Visit   Medication Sig Dispense Refill   • albuterol sulfate HFA (Ventolin HFA) 108 (90 Base) MCG/ACT inhaler Inhale 2 puffs Every 4 (Four) Hours As Needed for Wheezing. 18 g 5   • apixaban (Eliquis) 5 MG tablet tablet Take 1 tablet by mouth Every 12 (Twelve) Hours. 60 tablet 4   • atorvastatin (LIPITOR) 20 MG tablet TAKE 1 TABLET BY MOUTH ONCE DAILY 90 tablet 3   • Azelastine HCl 137 MCG/SPRAY solution 1 spray into the nostril(s) as directed by provider 2 (Two) Times a Day. 30 mL 1   • budesonide-formoterol (Symbicort) 160-4.5 MCG/ACT inhaler Inhale 2 puffs 2 (Two) Times a Day. 1 inhaler 12   • Cholecalciferol (vitamin D3) 125 MCG (5000 UT) capsule capsule Take 5,000 Units by mouth Daily.     • clonazePAM (KlonoPIN) 1 MG tablet TAKE 1 TABLET BY MOUTH DAILY AS NEEDED 30 tablet 0   • DULoxetine (CYMBALTA) 60 MG capsule TAKE ONE CAPSULE BY MOUTH TWICE DAILY 180 capsule 1   • ipratropium-albuterol (DUO-NEB) 0.5-2.5 mg/3 ml nebulizer Take 3 mL by nebulization Every 4 (Four) Hours As Needed for Wheezing. 360 mL 11   • oxybutynin XL  (DITROPAN-XL) 10 MG 24 hr tablet TAKE 1 TABLET BY MOUTH DAILY 90 tablet 1   • pantoprazole (PROTONIX) 40 MG EC tablet TAKE 1 TABLET BY MOUTH DAILY 90 tablet 3   • sertraline (ZOLOFT) 100 MG tablet TAKE 1 AND 1/2 TABLETS BY MOUTH DAILY 180 tablet 3   • traZODone (DESYREL) 100 MG tablet TAKE 1 TABLET BY MOUTH EVERY EVENING 90 tablet 3     No facility-administered medications prior to visit.       Patient Active Problem List   Diagnosis   • Adrenal adenoma   • Anxiety   • Chronic fatigue   • Fibromyalgia   • Hyperlipidemia   • Hypertension   • Insomnia   • Osteoarthritis of knee   • Osteoporosis   • Pulmonary emphysema (CMS/HCC)   • Simple renal cyst   • Tension headache   • Vitamin D deficiency   • Diverticulosis   • Inversion of nipple   • Paroxysmal atrial fibrillation (CMS/HCC)   • Coronary artery disease involving native coronary artery of native heart without angina pectoris   • Autonomic dysfunction   • Gastroesophageal reflux disease without esophagitis   • Lung abnormality   • Urge incontinence   • Calculus of gallbladder without cholecystitis without obstruction   • Anemia   • Erythrasma       Advanced Care Planning:  ACP discussion was held with the patient during this visit. Patient does not have an advance directive, declines further assistance.    Review of Systems   Constitutional: Negative.    Respiratory: Negative.    Cardiovascular: Negative.    Gastrointestinal: Negative.    Musculoskeletal: Negative.    Skin: Positive for rash.   Neurological: Negative.    Psychiatric/Behavioral: Negative.        Compared to one year ago, the patient feels her physical health is the same.  Compared to one year ago, the patient feels her mental health is the same.    Reviewed chart for potential of high risk medication in the elderly: yes  Reviewed chart for potential of harmful drug interactions in the elderly:no    Objective       There were no vitals filed for this visit.    There is no height or weight on file to  calculate BMI.  Discussed the patient's BMI with her. The BMI is in the acceptable range.    Physical Exam  Cardiovascular:      Rate and Rhythm: Normal rate and regular rhythm.      Heart sounds: Normal heart sounds.   Pulmonary:      Effort: Pulmonary effort is normal.      Breath sounds: Normal breath sounds.   Abdominal:      General: Bowel sounds are normal.   Musculoskeletal:      Cervical back: Neck supple.   Skin:     General: Skin is warm.      Findings: Rash present.   Neurological:      Mental Status: She is alert and oriented to person, place, and time.   Psychiatric:         Mood and Affect: Mood normal.         Behavior: Behavior normal.               Assessment/Plan   Medicare Risks and Personalized Health Plan  CMS Preventative Services Quick Reference  Advance Directive Discussion    The above risks/problems have been discussed with the patient.  Pertinent information has been shared with the patient in the After Visit Summary.  Follow up plans and orders are seen below in the Assessment/Plan Section.    Diagnoses and all orders for this visit:    1. Paroxysmal atrial fibrillation (CMS/[facility]) (Primary) cont med    2. Essential hypertension cont diet and watch     3. Hyperlipidemia, cont med    4. Coronary artery disease involving native coronary artery of native heart without angina pectoris cont med    5. Vitamin D deficiency cont med    6. Gastroesophageal reflux disease without esophagitis cont med    7. Diverticulosis    9. Urge incontinence    10. Simple renal cyst    11. Anemia, unspecified type check lab    12. Adrenal adenoma, 11/2020 negative     13. Anxiety cont med    14. Osteoporosis, do dexa     15. Osteoarthritis of knee, unspecified laterality, unspecified osteoarthritis type cont med    LBP follow up pain clinic    16. Fibromyalgia follow pain clinic    17. Erythrasma initiate med  -     clotrimazole-betamethasone (Lotrisone) 1-0.05 % cream; Apply  topically to the appropriate area as  directed 2 (Two) Times a Day.  Dispense: 45 g; Refill: 1    18. Wellness examination  -     CBC & Differential  -     Comprehensive Metabolic Panel  -     Lipid Panel  -     TSH  -     Vitamin D 25 Hydroxy  -     CK    19. Vitamin D deficiency, unspecified   -     Vitamin D 25 Hydroxy      Follow Up:  No follow-ups on file.     An After Visit Summary and PPPS were given to the patient.     6 mo and do lab now   ----historical data below  S/p cholecystectomy   tob, quit already. CT lung 8/2020, lung nodule, scarring and emphysema.   anxiety continue zoloft 150 mg daily.   and klonopin 1mg, 1/2 tab po prn , again emphasized as needed basis.  Continue cymbalta 60 bid.  Patient refused to take BuSpar or see psychiatrist.  CT scan showed liver cyst, large kidney cyst, adrenal gland adenoma, kidney stones non-obstructing. repeat showed no change 7/2020,benign per radiologist  right knee OA on xray aleve prn knee brace help  hip pain s/p steroid helped improved  Osteoporosis continue medication, refuse dexa   heme+, Colon polyps repeat 6/2019. EGD done  COPD continue medication. need to Quit tobacco. tudorsa, proair, continue O2 prn, symbicort refill nebulizer  Fibromyalgia stable watch continue medicine and follow up with pain clinic,with lortab  Tension HA continue flexeril and naproxen  sinus polyp per dentist, obtain rec.  ref to ENT.patient refuses, azelastine and Claritin D 5 mg in the morning  Nipple retraction mamm and US:negative pathology  Cysts in liver and kidney, watch  Kidney lesion renal image CT renal protocol cyst and stone non obstructing  A.fib and pericardia eff, follow cardio patient to schedule  Liver cyst on CT watch  Right neck SK watch for now, patient to call if increase in size or change in color  Colon 7/24/19, repeat in 3 years

## 2021-06-29 ENCOUNTER — TELEPHONE (OUTPATIENT)
Dept: INTERNAL MEDICINE | Facility: CLINIC | Age: 76
End: 2021-06-29

## 2021-06-29 NOTE — TELEPHONE ENCOUNTER
Caller: Malu Aguilar    Relationship: Emergency Contact    Best call back number: [phone]    What medication are you requesting: ANTIBIOTICS    What are your current symptoms: BACK PAIN, URGENT URINATION, FREQUENT URINATION, BURNING WITH URINATION    How long have you been experiencing symptoms: 1 DAY    Have you had these symptoms before:    [x] Yes  [] No    Have you been treated for these symptoms before:   [x] Yes  [] No    If a prescription is needed, what is your preferred pharmacy and phone number: [id number] [address] [employer] [facility] [employer] [facility] - [phone]  - [phone] FX

## 2021-06-29 NOTE — TELEPHONE ENCOUNTER
Contacted patient's daughter (on release) and notified that patient needed UA in order to be prescribed antibiotics; daughter expressed understanding and stated she would come in on MA schedule tomorrow to leave sample.

## 2021-07-15 ENCOUNTER — APPOINTMENT (OUTPATIENT)
Dept: BONE DENSITY | Facility: HOSPITAL | Age: 76
End: 2021-07-15

## 2021-07-15 ENCOUNTER — HOSPITAL ENCOUNTER (OUTPATIENT)
Dept: CT IMAGING | Facility: HOSPITAL | Age: 76
Discharge: HOME | End: 2021-07-15

## 2021-07-15 DIAGNOSIS — Z87.891 PERSONAL HISTORY OF TOBACCO USE, PRESENTING HAZARDS TO HEALTH: ICD-10-CM

## 2021-07-15 PROCEDURE — 77080 DXA BONE DENSITY AXIAL: CPT

## 2021-07-15 PROCEDURE — 71271 CT THORAX LUNG CANCER SCR C-: CPT

## 2021-07-18 RX ORDER — CLONAZEPAM 1 MG/1
TABLET ORAL
Qty: 30 TABLET | Refills: 0 | Status: SHIPPED | OUTPATIENT
Start: 2021-07-18 | End: 2021-08-19

## 2021-07-20 DIAGNOSIS — M81.0 OSTEOPOROSIS, UNSPECIFIED OSTEOPOROSIS TYPE, UNSPECIFIED PATHOLOGICAL FRACTURE PRESENCE: Primary | ICD-10-CM

## 2021-07-21 DIAGNOSIS — R91.8 LUNG MASS: Primary | ICD-10-CM

## 2021-08-04 DIAGNOSIS — I48.0 PAROXYSMAL ATRIAL FIBRILLATION (HCC): ICD-10-CM

## 2021-08-09 RX ORDER — DULOXETIN HYDROCHLORIDE 60 MG/1
CAPSULE, DELAYED RELEASE ORAL
Qty: 180 CAPSULE | Refills: 3 | Status: SHIPPED | OUTPATIENT
Start: 2021-08-09 | End: 2022-12-05 | Stop reason: SDUPTHER

## 2021-08-09 NOTE — TELEPHONE ENCOUNTER
Rx Refill Note  Requested Prescriptions     Pending Prescriptions Disp Refills   • DULoxetine (CYMBALTA) 60 MG capsule [Pharmacy Med Name: DULOXETINE DR 60MG CAPSULES] 180 capsule 1     Sig: TAKE ONE CAPSULE BY MOUTH TWICE DAILY      Last office visit with prescribing clinician: 6/28/2021      Next office visit with prescribing clinician: 1/3/2022            Jerome Castillo MA  08/09/21, 14:43 EDT

## 2021-08-11 RX ORDER — ATORVASTATIN CALCIUM 20 MG/1
TABLET, FILM COATED ORAL
Qty: 90 TABLET | Refills: 3 | Status: SHIPPED | OUTPATIENT
Start: 2021-08-11 | End: 2023-03-06

## 2021-08-11 NOTE — TELEPHONE ENCOUNTER
Rx Refill Note  Requested Prescriptions     Pending Prescriptions Disp Refills   • atorvastatin (LIPITOR) 20 MG tablet [Pharmacy Med Name: ATORVASTATIN 20MG TABLETS] 90 tablet 3     Sig: TAKE 1 TABLET BY MOUTH EVERY DAY      Last office visit with prescribing clinician: 6/28/2021      Next office visit with prescribing clinician: 1/3/2022       {Jackie Leone MA  08/11/21, 10:38 EDT

## 2021-08-19 RX ORDER — CLONAZEPAM 1 MG/1
TABLET ORAL
Qty: 30 TABLET | Refills: 0 | Status: SHIPPED | OUTPATIENT
Start: 2021-08-19 | End: 2021-09-20

## 2021-08-19 NOTE — TELEPHONE ENCOUNTER
Rx Refill Note  Requested Prescriptions     Pending Prescriptions Disp Refills   • clonazePAM (KlonoPIN) 1 MG tablet [Pharmacy Med Name: CLONAZEPAM 1MG TABLETS] 30 tablet 0     Sig: TAKE 1 TABLET BY MOUTH DAILY AS NEEDED      Last office visit with prescribing clinician: 6/28/2021      Next office visit with prescribing clinician: 1/3/2022            Jackie Leone MA  08/19/21, 08:36 EDT       No controlled substance agreement on file  No UDS in chart

## 2021-09-07 ENCOUNTER — OFFICE VISIT (OUTPATIENT)
Dept: CARDIOLOGY | Facility: CLINIC | Age: 76
End: 2021-09-07

## 2021-09-07 VITALS
RESPIRATION RATE: 18 BRPM | WEIGHT: 156 LBS | DIASTOLIC BLOOD PRESSURE: 62 MMHG | SYSTOLIC BLOOD PRESSURE: 98 MMHG | HEART RATE: 99 BPM | HEIGHT: 65 IN | OXYGEN SATURATION: 92 % | BODY MASS INDEX: 25.99 KG/M2

## 2021-09-07 DIAGNOSIS — I25.10 CORONARY ARTERY DISEASE INVOLVING NATIVE CORONARY ARTERY OF NATIVE HEART WITHOUT ANGINA PECTORIS: ICD-10-CM

## 2021-09-07 DIAGNOSIS — I48.0 PAROXYSMAL ATRIAL FIBRILLATION (HCC): Primary | ICD-10-CM

## 2021-09-07 DIAGNOSIS — I10 ESSENTIAL HYPERTENSION: ICD-10-CM

## 2021-09-07 PROCEDURE — 99214 OFFICE O/P EST MOD 30 MIN: CPT | Performed by: INTERNAL MEDICINE

## 2021-09-07 PROCEDURE — 93000 ELECTROCARDIOGRAM COMPLETE: CPT | Performed by: INTERNAL MEDICINE

## 2021-09-07 NOTE — PROGRESS NOTES
Psychiatric Cardiology Office Follow Up Note    Gabi Dudley  [id number]  2021    Primary Care Provider: Paul Quinteros MD    Chief Complaint: Routine follow-up    History of Present Illness:   Mrs. Gabi Dudley is a 76 y.o. female who presents to the Cardiology Clinic for  regular follow-up.  The patient has a past medical history significant for hypertension, dyslipidemia, prior pulmonary embolism, fibromyalgia, anxiety, and chronic tobacco use complicated by COPD.  Her past cardiac history is significant for atrial fibrillation, with an ECG in  demonstrating atrial fibrillation with a ventricular rate of 118 bpm. The patient also has a history of nonobstructive coronary artery disease, reportedly diagnosed on remote coronary angiography.   She returns to cardiology clinic today for routine follow-up.  Since her last appointment, the patient underwent surgery for cholecystectomy in the setting of cholelithiasis, as well as subsequent abdominal surgery for a cecal volvulus.  Since undergoing surgery, the patient reports persistent fatigue.  She additionally reports chronic exertional dyspnea, in the setting of underlying COPD.  She reports her primary complaint today is persistent exertional dyspnea.  She does not currently follow with pulmonology.  Denies chest pain or exertional chest discomfort.  No significant palpitations.  No orthopnea, PND, or lower extremity swelling.  No other specific complaints at this time.     Past Cardiac Testin. Last Coronary Angio:  , reportedly nonobstructive disease.  Report unavailable  2. Prior Stress Testing: Remote, with report unavailable  3. Last Echo: 2020              1.  Normal left ventricular size with low normal LV systolic function, LVEF 50-55%.              2.  Mild concentric LVH.              3.  Normal LV diastolic filling pattern.              4.  Mild right ventricular dilation with normal RV systolic  "function.              5.  Normal left and right atrial size.              6.  Trace MR and TR.  4. Prior Holter Monitor: 48-hour Holter 6/16/2020              1.  The predominant rhythm is sinus rhythm with an average heart rate 90 bpm.              2.  Normal atrioventricular conduction.              3.  No sustained supraventricular or ventricular arrhythmias.              4.  No episodes of paroxysmal atrial fibrillation.              5.  Rare supraventricular ectopy with isolated PACs, burden <0.1%.              6.  Rare ventricular ectopy with isolated and pairs of PVCs, burden <0.1%.              7.  One symptomatic episode of \"anxiety, chest pain, shortness of air\" corresponded to NSR at 91 bpm.    Review of Systems:   Review of Systems   Constitutional: Positive for fatigue. Negative for activity change, appetite change, chills, diaphoresis, fever, unexpected weight gain and unexpected weight loss.   Eyes: Negative for blurred vision and double vision.   Respiratory: Positive for shortness of breath. Negative for cough, chest tightness and wheezing.    Cardiovascular: Negative for chest pain, palpitations and leg swelling.   Gastrointestinal: Negative for abdominal pain, anal bleeding, blood in stool and GERD.   Endocrine: Negative for cold intolerance and heat intolerance.   Genitourinary: Negative for hematuria.   Neurological: Negative for dizziness, syncope, weakness and light-headedness.   Hematological: Does not bruise/bleed easily.   Psychiatric/Behavioral: Negative for depressed mood and stress. The patient is not nervous/anxious.        I have reviewed and/or updated the patient's past medical, past surgical, family, social history, problem list and allergies as appropriate.     Medications:     Current Outpatient Medications:   •  albuterol sulfate HFA (Ventolin HFA) 108 (90 Base) MCG/ACT inhaler, Inhale 2 puffs Every 4 (Four) Hours As Needed for Wheezing., Disp: 18 g, Rfl: 5  •  apixaban " (Eliquis) 5 MG tablet tablet, Take 1 tablet by mouth Every 12 (Twelve) Hours., Disp: 60 tablet, Rfl: 4  •  atorvastatin (LIPITOR) 20 MG tablet, TAKE 1 TABLET BY MOUTH EVERY DAY, Disp: 90 tablet, Rfl: 3  •  Azelastine HCl 137 MCG/SPRAY solution, 1 spray into the nostril(s) as directed by provider 2 (Two) Times a Day., Disp: 30 mL, Rfl: 1  •  benzonatate (TESSALON) 200 MG capsule, Take 1 capsule by mouth 3 (Three) Times a Day As Needed for Cough., Disp: 20 capsule, Rfl: 0  •  Cholecalciferol (vitamin D3) 125 MCG (5000 UT) capsule capsule, Take 5,000 Units by mouth Daily., Disp: , Rfl:   •  clonazePAM (KlonoPIN) 1 MG tablet, TAKE 1 TABLET BY MOUTH DAILY AS NEEDED, Disp: 30 tablet, Rfl: 0  •  DULoxetine (CYMBALTA) 60 MG capsule, TAKE ONE CAPSULE BY MOUTH TWICE DAILY, Disp: 180 capsule, Rfl: 3  •  HYDROcodone-acetaminophen (NORCO)  MG per tablet, Take 1 tablet by mouth 4 (Four) Times a Day As Needed., Disp: , Rfl:   •  ipratropium-albuterol (DUO-NEB) 0.5-2.5 mg/3 ml nebulizer, Take 3 mL by nebulization Every 4 (Four) Hours As Needed for Wheezing., Disp: 360 mL, Rfl: 11  •  O2 (OXYGEN), Inhale 2 L/min As Needed., Disp: , Rfl:   •  oxybutynin XL (DITROPAN-XL) 10 MG 24 hr tablet, TAKE 1 TABLET BY MOUTH DAILY, Disp: 90 tablet, Rfl: 1  •  pantoprazole (PROTONIX) 40 MG EC tablet, TAKE 1 TABLET BY MOUTH DAILY, Disp: 90 tablet, Rfl: 3  •  sertraline (ZOLOFT) 100 MG tablet, TAKE 1 AND 1/2 TABLETS BY MOUTH DAILY, Disp: 180 tablet, Rfl: 3  •  traZODone (DESYREL) 100 MG tablet, TAKE 1 TABLET BY MOUTH EVERY EVENING, Disp: 90 tablet, Rfl: 3  •  budesonide-formoterol (Symbicort) 160-4.5 MCG/ACT inhaler, Inhale 2 puffs 2 (Two) Times a Day., Disp: 1 inhaler, Rfl: 12  •  clotrimazole-betamethasone (Lotrisone) 1-0.05 % cream, Apply  topically to the appropriate area as directed 2 (Two) Times a Day., Disp: 45 g, Rfl: 1  •  predniSONE (DELTASONE) 20 MG tablet, Day 1-5 take 1 tab po three times a day every 8 hours, day 6-10 take 1 tab  "po two times a day, day 11-15 take one tab by mouth daily., Disp: 30 tablet, Rfl: 0    Physical Exam:  Vital Signs:   Vitals:    09/07/21 1412   BP: 98/62   BP Location: Right arm   Patient Position: Sitting   Pulse: 99   Resp: 18   SpO2: 92%  Comment: ROOM AIR   Weight: 70.8 kg (156 lb)   Height: 165.1 cm (65\")       Physical Exam  Constitutional:       General: She is not in acute distress.     Appearance: Normal appearance. She is well-developed. She is not diaphoretic.   HENT:      Head: Normocephalic and atraumatic.   Eyes:      General: No scleral icterus.     Pupils: Pupils are equal, round, and reactive to light.   Neck:      Trachea: No tracheal deviation.   Cardiovascular:      Rate and Rhythm: Normal rate and regular rhythm.      Heart sounds: Normal heart sounds. No murmur heard.   No friction rub. No gallop.       Comments: Normal JVD.  Pulmonary:      Effort: No respiratory distress.      Breath sounds: No stridor. No wheezing or rales.      Comments: Poor air movement bilaterally with prolonged expiratory phase  Chest:      Chest wall: No tenderness.   Abdominal:      General: Bowel sounds are normal. There is no distension.      Palpations: Abdomen is soft.      Tenderness: There is no abdominal tenderness. There is no guarding or rebound.   Musculoskeletal:         General: No swelling. Normal range of motion.      Cervical back: Neck supple. No tenderness.   Lymphadenopathy:      Cervical: No cervical adenopathy.   Skin:     General: Skin is warm and dry.      Findings: No erythema.   Neurological:      General: No focal deficit present.      Mental Status: She is alert and oriented to person, place, and time.   Psychiatric:         Mood and Affect: Mood normal.         Behavior: Behavior normal.         Results Review:   I reviewed the patient's new clinical results.  I personally viewed and interpreted the patient's EKG/Telemetry data      ECG 12 Lead    Date/Time: 9/7/2021 2:51 PM  Performed by: " Taiwo Curry MD  Authorized by: Taiwo Curry MD   Comparison: not compared with previous ECG   Rhythm: sinus rhythm and sinus arrhythmia  Rate: normal  QRS axis: normal  Other findings: non-specific ST-T wave changes    Clinical impression: abnormal EKG            Assessment / Plan:     1.  Paroxysmal atrial fibrillation   --Known history of paroxysmal atrial fibrillation  --Remains asymptomatic  --ECG today continues to show NSR  --CHADS2-VASc score of 4, continue Eliquis for CVA prophylaxis  --Follow-up in 6 months, or sooner if required     2.  Coronary artery disease   --History of nonobstructive coronary artery disease based on remote coronary angiography  --Continues to do well without chest pain or exertional anginal symptoms  --Continue atorvastatin      3.  Essential hypertension  --Remains controlled today     4.  Autonomic dysfunction  --History of autonomic dysfunction with intermittent symptoms of orthostasis  --Symptoms remain intermittent, do not limit daily activities     5.  Chronic tobacco use  --Prior cessation    6.  COPD  --Currently appears to be poorly controlled  --Advised to discuss with her PCP, consider referral to pulmonology          Follow Up:   Return in about 6 months (around 3/7/2022).      Thank you for allowing me to participate in the care of your patient. Please to not hesitate to contact me with additional questions or concerns.     VIDAL Curry MD  Interventional Cardiology   09/07/2021  14:08 EDT

## 2021-09-08 ENCOUNTER — TELEPHONE (OUTPATIENT)
Dept: CARDIOLOGY | Facility: CLINIC | Age: 76
End: 2021-09-08

## 2021-09-08 NOTE — TELEPHONE ENCOUNTER
Submitted tier reduction request for Eliquis 5 mg for price reduction. Will update patient with response.     Prescription Drug insurance information    [facility] Medicare Part D    Rx BIN [id number]  Rx PCN [id number]  ID [id number]  GROUP

## 2021-09-09 ENCOUNTER — TELEPHONE (OUTPATIENT)
Dept: CARDIOLOGY | Facility: CLINIC | Age: 76
End: 2021-09-09

## 2021-09-09 NOTE — TELEPHONE ENCOUNTER
Attempted to call pt to update in regards to Eliquis. Received update that tier reduction was approved. Contacted [facility], 90 day supply will cost pt $15 dollars. If pt prefers 30 day, it will be $5.   LVM for pt to return phone call.

## 2021-09-15 ENCOUNTER — TELEPHONE (OUTPATIENT)
Dept: INTERNAL MEDICINE | Facility: CLINIC | Age: 76
End: 2021-09-15

## 2021-09-15 DIAGNOSIS — J43.9 PULMONARY EMPHYSEMA, UNSPECIFIED EMPHYSEMA TYPE (HCC): Primary | ICD-10-CM

## 2021-09-15 NOTE — TELEPHONE ENCOUNTER
Caller: Malu Aguilar    Relationship: Emergency Contact    Best call back number: [phone]    Equipment requested: REGULAR OXYGEN SUPPLIES    Reason for the request: NEEDS REPLACEMENT     Prescribing Provider: LORNE    Additional information or concerns: PATIENT WOULD NEED ALL THE SUPPLIES FOR HER OXYGEN     PLEASE ADVISE

## 2021-09-17 NOTE — TELEPHONE ENCOUNTER
Contacted patient's daughter (on release) and she stated that [facility] has already delivered supplies but they are needing a new script. Please place order for home oxygen. Patient also has a home condenser and travel tank and I will add that to the DME order form once order for oxygen has been placed.

## 2021-09-20 RX ORDER — CLONAZEPAM 1 MG/1
TABLET ORAL
Qty: 30 TABLET | Refills: 0 | Status: SHIPPED | OUTPATIENT
Start: 2021-09-20 | End: 2021-11-09 | Stop reason: SDUPTHER

## 2021-09-20 NOTE — TELEPHONE ENCOUNTER
Rx Refill Note  Requested Prescriptions     Pending Prescriptions Disp Refills   • clonazePAM (KlonoPIN) 1 MG tablet [Pharmacy Med Name: CLONAZEPAM 1MG TABLETS] 30 tablet      Sig: TAKE 1 TABLET BY MOUTH DAILY AS NEEDED      Last office visit with prescribing clinician: 6/28/2021      Next office visit with prescribing clinician: 1/3/2022            JARON GRIFFIN MA  09/20/21, 17:04 EDT     UDS: N/A  CSA: N/A

## 2021-10-05 RX ORDER — PANTOPRAZOLE SODIUM 40 MG/1
40 TABLET, DELAYED RELEASE ORAL DAILY
Qty: 90 TABLET | Refills: 3 | Status: SHIPPED | OUTPATIENT
Start: 2021-10-05 | End: 2022-10-17

## 2021-10-05 NOTE — TELEPHONE ENCOUNTER
Rx Refill Note  Requested Prescriptions     Pending Prescriptions Disp Refills   • pantoprazole (PROTONIX) 40 MG EC tablet [Pharmacy Med Name: PANTOPRAZOLE 40MG TABLETS] 90 tablet 3     Sig: TAKE 1 TABLET BY MOUTH DAILY      Last office visit with prescribing clinician: 6/28/2021      Next office visit with prescribing clinician: 1/3/2022            Jackie Leone MA  10/05/21, 17:24 EDT

## 2021-10-11 ENCOUNTER — OFFICE VISIT (OUTPATIENT)
Dept: CARDIOLOGY | Facility: CLINIC | Age: 76
End: 2021-10-11

## 2021-10-11 VITALS
HEIGHT: 65 IN | DIASTOLIC BLOOD PRESSURE: 92 MMHG | HEART RATE: 103 BPM | WEIGHT: 161 LBS | RESPIRATION RATE: 18 BRPM | OXYGEN SATURATION: 98 % | SYSTOLIC BLOOD PRESSURE: 142 MMHG | BODY MASS INDEX: 26.82 KG/M2

## 2021-10-11 DIAGNOSIS — L08.1 ERYTHRASMA: Primary | ICD-10-CM

## 2021-10-11 DIAGNOSIS — I25.10 CORONARY ARTERY DISEASE INVOLVING NATIVE CORONARY ARTERY OF NATIVE HEART WITHOUT ANGINA PECTORIS: ICD-10-CM

## 2021-10-11 DIAGNOSIS — I48.0 PAROXYSMAL ATRIAL FIBRILLATION (HCC): ICD-10-CM

## 2021-10-11 PROCEDURE — 99214 OFFICE O/P EST MOD 30 MIN: CPT | Performed by: INTERNAL MEDICINE

## 2021-10-11 PROCEDURE — 93000 ELECTROCARDIOGRAM COMPLETE: CPT | Performed by: INTERNAL MEDICINE

## 2021-10-11 NOTE — PROGRESS NOTES
"             [facility] Cardiology Office Follow Up Note    Gabi Dudley  [id number]  10/11/2021    Primary Care Provider: Paul Quinteros MD    Chief Complaint: Routine follow-up    History of Present Illness:   Mrs. Gabi Dudley is a 76 y.o. female who presents to the Cardiology Clinic for  regular follow-up.  The patient has a past medical history significant for hypertension, dyslipidemia, prior pulmonary embolism, fibromyalgia, anxiety, and chronic tobacco use complicated by COPD.  Her past cardiac history is significant for atrial fibrillation and nonobstructive coronary artery disease diagnosed on remote coronary angiogram in approximately .   She returns to cardiology clinic today for routine regular follow-up.  Since her last appointment, the patient reports an approximate 2-3-week history of chest pain.  She describes a \"sharp\" discomfort located on her bilateral lateral chest wall which is worse with deep inspiration as well as leaning forward.  He reports he has been treated with outpatient antibiotics and steroids, with minimal improvement in her symptoms.  She denies any associated nausea, vomiting, or diaphoresis.  She denies any association of ambulation with her chest discomfort.  No associated palpitations.  She reports he is planning to undergo a CT scan to further evaluate her chest pain.  No other specific complaints at this time.     Past Cardiac Testin. Last Coronary Angio:  , reportedly nonobstructive disease.  Report unavailable  2. Prior Stress Testing: Remote, with report unavailable  3. Last Echo: 2020              1.  Normal left ventricular size with low normal LV systolic function, LVEF 50-55%.              2.  Mild concentric LVH.              3.  Normal LV diastolic filling pattern.              4.  Mild right ventricular dilation with normal RV systolic function.              5.  Normal left and right atrial size.              6.  Trace MR and " "TR.  4. Prior Holter Monitor: 48-hour Holter 6/16/2020              1.  The predominant rhythm is sinus rhythm with an average heart rate 90 bpm.              2.  Normal atrioventricular conduction.              3.  No sustained supraventricular or ventricular arrhythmias.              4.  No episodes of paroxysmal atrial fibrillation.              5.  Rare supraventricular ectopy with isolated PACs, burden <0.1%.              6.  Rare ventricular ectopy with isolated and pairs of PVCs, burden <0.1%.              7.  One symptomatic episode of \"anxiety, chest pain, shortness of air\" corresponded to NSR at 91 bpm.    Review of Systems:   Review of Systems   Constitutional: Negative for activity change, appetite change, chills, diaphoresis, fatigue, fever, unexpected weight gain and unexpected weight loss.   Eyes: Negative for blurred vision and double vision.   Respiratory: Positive for shortness of breath. Negative for cough, chest tightness and wheezing.    Cardiovascular: Positive for chest pain. Negative for palpitations and leg swelling.   Gastrointestinal: Negative for abdominal pain, anal bleeding, blood in stool and GERD.   Endocrine: Negative for cold intolerance and heat intolerance.   Genitourinary: Negative for hematuria.   Neurological: Negative for dizziness, syncope, weakness and light-headedness.   Hematological: Does not bruise/bleed easily.   Psychiatric/Behavioral: Negative for depressed mood and stress. The patient is not nervous/anxious.        I have reviewed and/or updated the patient's past medical, past surgical, family, social history, problem list and allergies as appropriate.     Medications:     Current Outpatient Medications:   •  albuterol sulfate HFA (Ventolin HFA) 108 (90 Base) MCG/ACT inhaler, Inhale 2 puffs Every 4 (Four) Hours As Needed for Wheezing., Disp: 18 g, Rfl: 5  •  apixaban (Eliquis) 5 MG tablet tablet, Take 1 tablet by mouth Every 12 (Twelve) Hours., Disp: 60 tablet, Rfl: " "4  •  atorvastatin (LIPITOR) 20 MG tablet, TAKE 1 TABLET BY MOUTH EVERY DAY, Disp: 90 tablet, Rfl: 3  •  Azelastine HCl 137 MCG/SPRAY solution, 1 spray into the nostril(s) as directed by provider 2 (Two) Times a Day., Disp: 30 mL, Rfl: 1  •  benzonatate (TESSALON) 200 MG capsule, Take 1 capsule by mouth 3 (Three) Times a Day As Needed for Cough., Disp: 20 capsule, Rfl: 0  •  budesonide-formoterol (Symbicort) 160-4.5 MCG/ACT inhaler, Inhale 2 puffs 2 (Two) Times a Day., Disp: 1 inhaler, Rfl: 12  •  Cholecalciferol (vitamin D3) 125 MCG (5000 UT) capsule capsule, Take 5,000 Units by mouth Daily., Disp: , Rfl:   •  clonazePAM (KlonoPIN) 1 MG tablet, TAKE 1 TABLET BY MOUTH DAILY AS NEEDED, Disp: 30 tablet, Rfl: 0  •  clotrimazole-betamethasone (Lotrisone) 1-0.05 % cream, Apply  topically to the appropriate area as directed 2 (Two) Times a Day., Disp: 45 g, Rfl: 1  •  DULoxetine (CYMBALTA) 60 MG capsule, TAKE ONE CAPSULE BY MOUTH TWICE DAILY, Disp: 180 capsule, Rfl: 3  •  HYDROcodone-acetaminophen (NORCO)  MG per tablet, Take 1 tablet by mouth 4 (Four) Times a Day As Needed., Disp: , Rfl:   •  ipratropium-albuterol (DUO-NEB) 0.5-2.5 mg/3 ml nebulizer, Take 3 mL by nebulization Every 4 (Four) Hours As Needed for Wheezing., Disp: 360 mL, Rfl: 11  •  O2 (OXYGEN), Inhale 2 L/min As Needed., Disp: , Rfl:   •  oxybutynin XL (DITROPAN-XL) 10 MG 24 hr tablet, TAKE 1 TABLET BY MOUTH DAILY, Disp: 90 tablet, Rfl: 1  •  pantoprazole (PROTONIX) 40 MG EC tablet, TAKE 1 TABLET BY MOUTH DAILY, Disp: 90 tablet, Rfl: 3  •  sertraline (ZOLOFT) 100 MG tablet, TAKE 1 AND 1/2 TABLETS BY MOUTH DAILY, Disp: 180 tablet, Rfl: 3  •  traZODone (DESYREL) 100 MG tablet, TAKE 1 TABLET BY MOUTH EVERY EVENING, Disp: 90 tablet, Rfl: 3    Physical Exam:  Vital Signs:   Vitals:    10/11/21 1311   BP: 142/92   BP Location: Right arm   Patient Position: Sitting   Pulse: 103   Resp: 18   SpO2: 98%   Weight: 73 kg (161 lb)   Height: 165.1 cm (65\") "       Physical Exam  Constitutional:       General: She is not in acute distress.     Appearance: She is well-developed. She is not diaphoretic.   HENT:      Head: Normocephalic and atraumatic.   Eyes:      General: No scleral icterus.     Pupils: Pupils are equal, round, and reactive to light.   Neck:      Trachea: No tracheal deviation.   Cardiovascular:      Rate and Rhythm: Normal rate and regular rhythm.      Heart sounds: Normal heart sounds. No murmur heard.  No friction rub. No gallop.       Comments: Normal JVD.  Pulmonary:      Effort: Pulmonary effort is normal. No respiratory distress.      Breath sounds: No stridor. No wheezing or rales.      Comments: Diminished air movement bilaterally.  On supplemental O2 via nasal cannula.  Chest:      Chest wall: No tenderness.   Abdominal:      General: Bowel sounds are normal. There is no distension.      Palpations: Abdomen is soft.      Tenderness: There is no abdominal tenderness. There is no guarding or rebound.   Musculoskeletal:         General: No swelling. Normal range of motion.      Cervical back: Neck supple. No tenderness.   Lymphadenopathy:      Cervical: No cervical adenopathy.   Skin:     General: Skin is warm and dry.      Findings: No erythema.   Neurological:      General: No focal deficit present.      Mental Status: She is alert and oriented to person, place, and time.   Psychiatric:         Mood and Affect: Mood normal.         Behavior: Behavior normal.         Results Review:   I reviewed the patient's new clinical results.  I personally viewed and interpreted the patient's EKG/Telemetry data      ECG 12 Lead    Date/Time: 10/11/2021 3:29 PM  Performed by: Taiwo Curry MD  Authorized by: Taiwo Curry MD   Comparison: not compared with previous ECG   Rhythm: sinus rhythm  Rate: normal  QRS axis: normal  Other findings: left atrial abnormality  Clinical impression comment: Borderline ECG.              Assessment / Plan:      1.  Paroxysmal atrial fibrillation   --ECG today continues show NSR  --Remains asymptomatic  --CHADS2-VASc score of 4, continue Eliquis for CVA prophylaxis  --If recurrent, would consider ventricular rate control strategy     2.  Coronary artery disease   --History of nonobstructive coronary artery disease based on remote coronary angiography  --Episodes of chest pain are atypical in character, most consistent with pleuritic pain  --Given low suspicion for ischemia, will defer ischemic evaluation for now  --If chest pain is persistent with no clear pulmonary etiology identified, will would then consider MPS  --Continue atorvastatin      3.  Essential hypertension  --Hypertensive today, however BP generally controlled in the past  --Will consider Norvasc if hypertension persists     4.  Autonomic dysfunction  --No recent orthostatic symptoms     5.  Chronic tobacco use  --Prior cessation     6.  COPD  --Will refer to pulmonology for further management per patient request    Follow Up:   Return in about 3 months (around 1/11/2022).      Thank you for allowing me to participate in the care of your patient. Please to not hesitate to contact me with additional questions or concerns.     VIDAL Curry MD  Interventional Cardiology   10/11/2021  13:01 EDT

## 2021-10-12 ENCOUNTER — TELEPHONE (OUTPATIENT)
Dept: INTERNAL MEDICINE | Facility: CLINIC | Age: 76
End: 2021-10-12

## 2021-10-12 ENCOUNTER — HOSPITAL ENCOUNTER (OUTPATIENT)
Dept: GENERAL RADIOLOGY | Facility: HOSPITAL | Age: 76
Discharge: HOME | End: 2021-10-12
Admitting: NURSE PRACTITIONER

## 2021-10-12 ENCOUNTER — OFFICE VISIT (OUTPATIENT)
Dept: INTERNAL MEDICINE | Facility: CLINIC | Age: 76
End: 2021-10-12

## 2021-10-12 VITALS
SYSTOLIC BLOOD PRESSURE: 118 MMHG | WEIGHT: 162.4 LBS | BODY MASS INDEX: 27.06 KG/M2 | HEART RATE: 88 BPM | OXYGEN SATURATION: 96 % | DIASTOLIC BLOOD PRESSURE: 58 MMHG | TEMPERATURE: 97.3 F | HEIGHT: 65 IN | RESPIRATION RATE: 18 BRPM

## 2021-10-12 DIAGNOSIS — J40 BRONCHITIS: Primary | ICD-10-CM

## 2021-10-12 DIAGNOSIS — L08.1 ERYTHRASMA: ICD-10-CM

## 2021-10-12 DIAGNOSIS — R07.81 PLEURITIC PAIN: ICD-10-CM

## 2021-10-12 DIAGNOSIS — J41.0 SIMPLE CHRONIC BRONCHITIS (HCC): ICD-10-CM

## 2021-10-12 PROCEDURE — 71046 X-RAY EXAM CHEST 2 VIEWS: CPT

## 2021-10-12 PROCEDURE — 99214 OFFICE O/P EST MOD 30 MIN: CPT | Performed by: NURSE PRACTITIONER

## 2021-10-12 RX ORDER — METHOCARBAMOL 750 MG/1
750 TABLET, FILM COATED ORAL 2 TIMES DAILY PRN
Qty: 30 TABLET | Refills: 0 | Status: SHIPPED | OUTPATIENT
Start: 2021-10-12 | End: 2021-11-09 | Stop reason: SDUPTHER

## 2021-10-12 RX ORDER — CLOTRIMAZOLE AND BETAMETHASONE DIPROPIONATE 10; .64 MG/G; MG/G
CREAM TOPICAL 2 TIMES DAILY
Qty: 45 G | Refills: 1 | Status: SHIPPED | OUTPATIENT
Start: 2021-10-12

## 2021-10-12 RX ORDER — BUDESONIDE AND FORMOTEROL FUMARATE DIHYDRATE 160; 4.5 UG/1; UG/1
2 AEROSOL RESPIRATORY (INHALATION)
Status: CANCELLED | OUTPATIENT
Start: 2021-10-12

## 2021-10-12 NOTE — PROGRESS NOTES
Office Visit      Patient Name: Gabi Dudley  : 1945   MRN: [id number]   Care Team: Patient Care Team:  Paul Quinteros MD as PCP - General (Internal Medicine)  Sima Moses MD as Consulting Physician (General Surgery)  Taiwo Curry MD as Consulting Physician (Cardiology)    Chief Complaint  Pneumonia and Pleurisy    Subjective     Subjective      Gabi Dudley presents to [facility] for pneumonia and pleuritic pain. Has been treated for this problem twice by [facility] license of [facility] care- first with doxycycline and prednisone burst and lastly with ceftin, depo-medrol, and prednisone on . A CT scan was ordered by her PCP and is scheduled for 10/15. She has also been referred to pulmonary but appointment is not until January. Denies fever, lack of appetite, hemoptysis, non-productive cough, loss of taste or smell, and shortness of breath.  She continues to have severe pain in the ribs more so on the right anterior chest wall that radiates posteriorly.  She has had no x-ray imaging since the symptoms began.  She is currently on 2L/NC. She has been tested for COVID and was negative.   Has been using mucinex BID but has not seem to help symptoms much.  She does have a history of COPD and had previously been on Symbicort, she was unable to afford this so has not been able to use in several years.  She had does have an albuterol rescue inhaler that she has been using it helps minimally, using at least twice per day.    Review of Systems   Constitutional: Positive for fatigue. Negative for chills and fever.   HENT: Negative for congestion, postnasal drip, sinus pressure, sneezing, sore throat, swollen glands and trouble swallowing.    Respiratory: Positive for cough and shortness of breath. Negative for chest tightness and wheezing.    Cardiovascular: Positive for chest pain (Right anterior chest wall).   Gastrointestinal: Negative for abdominal pain, diarrhea, nausea and vomiting.  "  Neurological: Negative for headache.   Psychiatric/Behavioral: Negative for sleep disturbance.       Objective     Objective   Vital Signs:   /58   Pulse 88   Temp 97.3 °F (36.3 °C) (Temporal)   Resp 18   Ht 165.1 cm (65\")   Wt 73.7 kg (162 lb 6.4 oz)   SpO2 96% Comment: on 2 L of O2  BMI 27.02 kg/m²     Physical Exam  Constitutional:       General: She is not in acute distress.     Appearance: Normal appearance. She is ill-appearing (Appears in acute pain). She is not toxic-appearing.   HENT:      Right Ear: Tympanic membrane and ear canal normal. No middle ear effusion. Tympanic membrane is not bulging.      Left Ear: Tympanic membrane and ear canal normal.  No middle ear effusion. Tympanic membrane is not bulging.      Nose: Nose normal. No congestion or rhinorrhea.      Right Sinus: No maxillary sinus tenderness or frontal sinus tenderness.      Left Sinus: No maxillary sinus tenderness or frontal sinus tenderness.      Mouth/Throat:      Mouth: Mucous membranes are moist.      Pharynx: No posterior oropharyngeal erythema.   Eyes:      Pupils: Pupils are equal, round, and reactive to light.   Cardiovascular:      Rate and Rhythm: Normal rate and regular rhythm.      Heart sounds: Normal heart sounds. No murmur heard.      Pulmonary:      Effort: Pulmonary effort is normal. No respiratory distress.      Breath sounds: Wheezing (Faint wheezing in right lobe) present.      Comments: Difficulty getting a deep breath    Chest:      Chest wall: Tenderness (Right anterior and posterior chest wall) present.   Abdominal:      General: Bowel sounds are normal. There is no distension.      Palpations: Abdomen is soft.      Tenderness: There is no abdominal tenderness.   Musculoskeletal:         General: Deformity present.      Cervical back: Neck supple. No tenderness.      Comments: Using wheelchair     Lymphadenopathy:      Head:      Right side of head: No submental, submandibular or tonsillar adenopathy. "      Left side of head: No submental, submandibular or tonsillar adenopathy.      Cervical: No cervical adenopathy.   Skin:     General: Skin is warm and dry.      Findings: No rash.   Neurological:      Mental Status: She is alert.   Psychiatric:         Mood and Affect: Mood normal.         Behavior: Behavior normal.        Assessment / Plan      Assessment/Plan   Problem List Items Addressed This Visit        Other    Erythrasma    Relevant Medications    clotrimazole-betamethasone (Lotrisone) 1-0.05 % cream  Refill provided.      Other Visit Diagnoses     Bronchitis    -  Primary    Relevant Medications    tiotropium bromide-olodaterol (STIOLTO RESPIMAT) 2.5-2.5 MCG/ACT aerosol solution inhaler    Other Relevant Orders    XR Chest 2 View    Pleuritic pain        Relevant Orders    XR Chest 2 View    Simple chronic bronchitis (HCC)        Relevant Medications    tiotropium bromide-olodaterol (STIOLTO RESPIMAT) 2.5-2.5 MCG/ACT aerosol solution inhaler    We will perform x-ray today to rule out any acute idiopathic fracture due to recent steroid use.  Suspect her pain is related to elevation of the pleura.  Low-dose muscle relaxer given and side effects discussed with her and daughter of drowsiness, advised to take sparingly.  Recommend lidocaine patches, Voltaren gel, and heat to the site as needed.  Discussed counter pressure and advised to take at least 10 deep breaths every hour to prevent further pneumonia.  Will treat as appropriate based upon x-ray results, pulmonary exam nonsignificant today.  I recommend starting daily LABA/LAMA therapy and ordered today,.  Albuterol only on an as-needed basis, hope to decrease use with initiation of daily inhaler therapy.  Advised to keep appointment for CT on Friday.  Return with worsening or persisting symptoms.  Return precautions discussed.           Follow Up   Return if symptoms worsen or fail to improve.  Patient was given instructions and counseling regarding her  condition or for health maintenance advice. Please see specific information pulled into the AVS if appropriate.     REGINA Fortune  [facility]

## 2021-10-13 DIAGNOSIS — M80.00XA OSTEOPOROSIS WITH CURRENT PATHOLOGICAL FRACTURE, UNSPECIFIED OSTEOPOROSIS TYPE, INITIAL ENCOUNTER: ICD-10-CM

## 2021-10-13 DIAGNOSIS — S22.050A COMPRESSION FRACTURE OF T5 VERTEBRA, INITIAL ENCOUNTER (HCC): Primary | ICD-10-CM

## 2021-10-15 ENCOUNTER — HOSPITAL ENCOUNTER (OUTPATIENT)
Dept: CT IMAGING | Facility: HOSPITAL | Age: 76
Discharge: HOME | End: 2021-10-15

## 2021-10-15 DIAGNOSIS — M80.00XA OSTEOPOROSIS WITH CURRENT PATHOLOGICAL FRACTURE, UNSPECIFIED OSTEOPOROSIS TYPE, INITIAL ENCOUNTER: ICD-10-CM

## 2021-10-15 DIAGNOSIS — R91.8 LUNG MASS: ICD-10-CM

## 2021-10-15 DIAGNOSIS — S22.050A COMPRESSION FRACTURE OF T5 VERTEBRA, INITIAL ENCOUNTER (HCC): ICD-10-CM

## 2021-10-15 PROCEDURE — 72128 CT CHEST SPINE W/O DYE: CPT

## 2021-10-15 PROCEDURE — 71250 CT THORAX DX C-: CPT

## 2021-10-25 ENCOUNTER — APPOINTMENT (OUTPATIENT)
Dept: CT IMAGING | Facility: HOSPITAL | Age: 76
End: 2021-10-25

## 2021-11-09 ENCOUNTER — OFFICE VISIT (OUTPATIENT)
Dept: INTERNAL MEDICINE | Facility: CLINIC | Age: 76
End: 2021-11-09

## 2021-11-09 VITALS
HEIGHT: 65 IN | HEART RATE: 64 BPM | OXYGEN SATURATION: 99 % | WEIGHT: 164 LBS | TEMPERATURE: 96.6 F | SYSTOLIC BLOOD PRESSURE: 120 MMHG | BODY MASS INDEX: 27.32 KG/M2 | DIASTOLIC BLOOD PRESSURE: 74 MMHG

## 2021-11-09 DIAGNOSIS — F41.9 ANXIETY: ICD-10-CM

## 2021-11-09 DIAGNOSIS — E55.9 VITAMIN D DEFICIENCY: ICD-10-CM

## 2021-11-09 DIAGNOSIS — M54.6 THORACIC BACK PAIN, UNSPECIFIED BACK PAIN LATERALITY, UNSPECIFIED CHRONICITY: ICD-10-CM

## 2021-11-09 DIAGNOSIS — Z01.818 PREOP EXAMINATION: ICD-10-CM

## 2021-11-09 DIAGNOSIS — E78.5 HYPERLIPIDEMIA, UNSPECIFIED HYPERLIPIDEMIA TYPE: ICD-10-CM

## 2021-11-09 DIAGNOSIS — J44.1 COPD WITH ACUTE EXACERBATION (HCC): ICD-10-CM

## 2021-11-09 DIAGNOSIS — R94.5 ABNORMAL RESULTS OF LIVER FUNCTION STUDIES: ICD-10-CM

## 2021-11-09 DIAGNOSIS — R79.1 ABNORMAL COAGULATION PROFILE: ICD-10-CM

## 2021-11-09 DIAGNOSIS — M81.0 OSTEOPOROSIS, UNSPECIFIED OSTEOPOROSIS TYPE, UNSPECIFIED PATHOLOGICAL FRACTURE PRESENCE: ICD-10-CM

## 2021-11-09 DIAGNOSIS — D64.9 ANEMIA, UNSPECIFIED TYPE: ICD-10-CM

## 2021-11-09 DIAGNOSIS — I10 PRIMARY HYPERTENSION: Primary | ICD-10-CM

## 2021-11-09 PROBLEM — S22.050A COMPRESSION FRACTURE OF T5 VERTEBRA: Status: ACTIVE | Noted: 2021-11-09

## 2021-11-09 PROCEDURE — 90662 IIV NO PRSV INCREASED AG IM: CPT | Performed by: INTERNAL MEDICINE

## 2021-11-09 PROCEDURE — G0008 ADMIN INFLUENZA VIRUS VAC: HCPCS | Performed by: INTERNAL MEDICINE

## 2021-11-09 PROCEDURE — 99214 OFFICE O/P EST MOD 30 MIN: CPT | Performed by: INTERNAL MEDICINE

## 2021-11-09 RX ORDER — OXYCODONE HYDROCHLORIDE AND ACETAMINOPHEN 5; 325 MG/1; MG/1
TABLET ORAL
COMMUNITY
Start: 2021-11-01 | End: 2022-01-11

## 2021-11-09 RX ORDER — BENZONATATE 200 MG/1
200 CAPSULE ORAL 3 TIMES DAILY PRN
Qty: 20 CAPSULE | Refills: 0 | Status: SHIPPED | OUTPATIENT
Start: 2021-11-09 | End: 2021-11-17 | Stop reason: SDUPTHER

## 2021-11-09 RX ORDER — CLONAZEPAM 1 MG/1
1 TABLET ORAL DAILY PRN
Qty: 30 TABLET | Refills: 0 | Status: SHIPPED | OUTPATIENT
Start: 2021-11-09 | End: 2021-12-02 | Stop reason: SDUPTHER

## 2021-11-09 RX ORDER — METHOCARBAMOL 750 MG/1
750 TABLET, FILM COATED ORAL 2 TIMES DAILY PRN
Qty: 30 TABLET | Refills: 0 | Status: SHIPPED | OUTPATIENT
Start: 2021-11-09 | End: 2021-12-02 | Stop reason: SDUPTHER

## 2021-11-09 NOTE — PROGRESS NOTES
Subjective   Gabi Dudley is a 76 y.o. female.     Chief Complaint   Patient presents with   • Pre-op Exam     back surgery; [place] [facility]    • Back Pain       History of Present Illness       Current Outpatient Medications:   •  albuterol sulfate HFA (Ventolin HFA) 108 (90 Base) MCG/ACT inhaler, Inhale 2 puffs Every 4 (Four) Hours As Needed for Wheezing., Disp: 18 g, Rfl: 5  •  apixaban (Eliquis) 5 MG tablet tablet, Take 1 tablet by mouth Every 12 (Twelve) Hours., Disp: 60 tablet, Rfl: 4  •  atorvastatin (LIPITOR) 20 MG tablet, TAKE 1 TABLET BY MOUTH EVERY DAY, Disp: 90 tablet, Rfl: 3  •  Azelastine HCl 137 MCG/SPRAY solution, 1 spray into the nostril(s) as directed by provider 2 (Two) Times a Day., Disp: 30 mL, Rfl: 1  •  benzonatate (TESSALON) 200 MG capsule, Take 1 capsule by mouth 3 (Three) Times a Day As Needed for Cough., Disp: 20 capsule, Rfl: 0  •  Cholecalciferol (vitamin D3) 125 MCG (5000 UT) capsule capsule, Take 5,000 Units by mouth Daily., Disp: , Rfl:   •  clonazePAM (KlonoPIN) 1 MG tablet, TAKE 1 TABLET BY MOUTH DAILY AS NEEDED, Disp: 30 tablet, Rfl: 0  •  clotrimazole-betamethasone (Lotrisone) 1-0.05 % cream, Apply  topically to the appropriate area as directed 2 (Two) Times a Day., Disp: 45 g, Rfl: 1  •  DULoxetine (CYMBALTA) 60 MG capsule, TAKE ONE CAPSULE BY MOUTH TWICE DAILY, Disp: 180 capsule, Rfl: 3  •  HYDROcodone-acetaminophen (NORCO)  MG per tablet, Take 1 tablet by mouth 4 (Four) Times a Day As Needed., Disp: , Rfl:   •  ipratropium-albuterol (DUO-NEB) 0.5-2.5 mg/3 ml nebulizer, Take 3 mL by nebulization Every 4 (Four) Hours As Needed for Wheezing., Disp: 360 mL, Rfl: 11  •  methocarbamol (ROBAXIN) 750 MG tablet, Take 1 tablet by mouth 2 (Two) Times a Day As Needed for Muscle Spasms., Disp: 30 tablet, Rfl: 0  •  O2 (OXYGEN), Inhale 2 L/min As Needed., Disp: , Rfl:   •  oxybutynin XL (DITROPAN-XL) 10 MG 24 hr tablet, TAKE 1 TABLET BY MOUTH DAILY, Disp: 90 tablet, Rfl: 1  •   oxyCODONE-acetaminophen (PERCOCET) 5-325 MG per tablet, TAKE 1/2 TO 1 TABLET BY MOUTH ONCE DAILY AS NEEDED FOR PAIN, Disp: , Rfl:   •  pantoprazole (PROTONIX) 40 MG EC tablet, TAKE 1 TABLET BY MOUTH DAILY, Disp: 90 tablet, Rfl: 3  •  sertraline (ZOLOFT) 100 MG tablet, TAKE 1 AND 1/2 TABLETS BY MOUTH DAILY, Disp: 180 tablet, Rfl: 3  •  tiotropium bromide-olodaterol (STIOLTO RESPIMAT) 2.5-2.5 MCG/ACT aerosol solution inhaler, Inhale 2 puffs Daily., Disp: 4 g, Rfl: 1  •  traZODone (DESYREL) 100 MG tablet, TAKE 1 TABLET BY MOUTH EVERY EVENING, Disp: 90 tablet, Rfl: 3    {Common H&P Review Areas:00831}    Review of Systems    Objective   Physical Exam    All tests have been reviewed.    Assessment/Plan   Diagnoses and all orders for this visit:    Primary hypertension    Hyperlipidemia, unspecified hyperlipidemia type    Anemia, unspecified type    Osteoporosis, unspecified osteoporosis type, unspecified pathological fracture presence    Vitamin D deficiency    COPD with acute exacerbation (HCC)    Other orders  -     oxyCODONE-acetaminophen (PERCOCET) 5-325 MG per tablet; TAKE 1/2 TO 1 TABLET BY MOUTH ONCE DAILY AS NEEDED FOR PAIN  -     Fluzone High-Dose 65+yrs (2248-3601)              ----historical data below  S/p cholecystectomy   tob, quit already. CT lung 8/2020, lung nodule, scarring and emphysema.   anxiety continue zoloft 150 mg daily.   and klonopin 1mg, 1/2 tab po prn , again emphasized as needed basis.  Continue cymbalta 60 bid.  Patient refused to take BuSpar or see psychiatrist.  CT scan showed liver cyst, large kidney cyst, adrenal gland adenoma, kidney stones non-obstructing. repeat showed no change 7/2020,benign per radiologist  right knee OA on xray aleve prn knee brace help  hip pain s/p steroid helped improved  Osteoporosis continue medication, refuse dexa   heme+, Colon polyps repeat 6/2019. EGD done  COPD continue medication. need to Quit tobacco. tudorsa, proair, continue O2 prn, symbicort refill  nebulizer  Fibromyalgia stable watch continue medicine and follow up with pain clinic,with lortab  Tension HA continue flexeril and naproxen  sinus polyp per dentist, obtain rec.  ref to ENT.patient refuses, azelastine and Claritin D 5 mg in the morning  Nipple retraction mamm and US:negative pathology  Cysts in liver and kidney, watch  Kidney lesion renal image CT renal protocol cyst and stone non obstructing  A.fib and pericardia eff, follow cardio patient to schedule  Liver cyst on CT watch  Right neck SK watch for now, patient to call if increase in size or change in color  Colon 7/24/19, repeat in 3 years

## 2021-11-09 NOTE — PROGRESS NOTES
Subjective   Gabi Dudley is a 76 y.o. female who presents to the office today for a preoperative consultation at the request of surgeon  who plans on performing kyphoplasty . This consultation is requested for the specific conditions prompting preoperative evaluation (i.e. because of potential affect on operative risk): a.fib. Planned anesthesia: general. The patient has the following known anesthesia issues: NA. Patients bleeding risk: no recent abnormal bleeding. Patient does not have objections to receiving blood products if needed.    The following portions of the patient's history were reviewed and updated as appropriate: allergies, current medications, past family history, past medical history, past social history, past surgical history and problem list.    Review of Systems  Review of Systems   Constitutional: Negative.    Respiratory: Negative.    Cardiovascular: Negative.    Gastrointestinal: Negative.    Musculoskeletal: Positive for back pain.   Skin: Negative.    Neurological: Negative.    Psychiatric/Behavioral: Negative.        Objective   Physical Exam  Constitutional:       Appearance: Normal appearance.   Cardiovascular:      Rate and Rhythm: Normal rate and regular rhythm.      Heart sounds: Normal heart sounds.   Pulmonary:      Effort: Pulmonary effort is normal.      Breath sounds: Normal breath sounds.   Abdominal:      General: Bowel sounds are normal.   Musculoskeletal:         General: Tenderness present.      Cervical back: Neck supple.   Skin:     General: Skin is warm.   Neurological:      Mental Status: She is alert and oriented to person, place, and time.   Psychiatric:         Behavior: Behavior normal.         All  tests have been reviewed.    Cardiographics  ECG: normal sinus rhythm, no blocks or conduction defects, no ischemic changes  Echocardiogram: not done    Imaging  Chest x-ray: normal     Lab Review   pending    Assessment/Plan   76 y.o. female with planned surgery as  above.    Known risk factors for perioperative complications: Coronary disease        Cardiac Risk Estimation:     Current medications which may produce withdrawal symptoms if withheld perioperatively:     1. Preoperative workup as follows hemoglobin, hematocrit, electrolytes, creatinine, glucose, liver function studies, coagulation studies, urinalysis (urinary tract instrumentation planned).  2. Change in medication regimen before surgery: none, continue medication regimen including morning of surgery, with sip of water.  3. Prophylaxis for cardiac events with perioperative beta-blockers: not indicated.  4. Deep vein thrombosis prophylaxis postoperatively:regimen to be chosen by surgical team.            T5 compression fracture pending kyphosis.  Upon approval by cardiologist and acceptable labs patient will be released for surgery.  osteoporosis pending to see rheum  eliques patient is to discuss with cardiology  Do labs  Patient is on oxygen now due to T5 compression.   Mild wheeze cont nebulizer.

## 2021-11-16 LAB
APTT PPP: 40.5 SECONDS (ref 24.5–37.2)
INR PPP: 1.07 (ref 0.9–1.1)
PROTHROMBIN TIME: 14.5 SECONDS (ref 12–15.1)

## 2021-11-17 ENCOUNTER — OFFICE VISIT (OUTPATIENT)
Dept: INTERNAL MEDICINE | Facility: CLINIC | Age: 76
End: 2021-11-17

## 2021-11-17 VITALS
SYSTOLIC BLOOD PRESSURE: 151 MMHG | DIASTOLIC BLOOD PRESSURE: 93 MMHG | OXYGEN SATURATION: 96 % | HEART RATE: 77 BPM | HEIGHT: 65 IN | TEMPERATURE: 97.1 F | BODY MASS INDEX: 27.29 KG/M2

## 2021-11-17 DIAGNOSIS — S22.050D COMPRESSION FRACTURE OF T5 VERTEBRA WITH ROUTINE HEALING, SUBSEQUENT ENCOUNTER: ICD-10-CM

## 2021-11-17 DIAGNOSIS — N39.0 URINARY TRACT INFECTION WITHOUT HEMATURIA, SITE UNSPECIFIED: ICD-10-CM

## 2021-11-17 DIAGNOSIS — J44.1 COPD WITH ACUTE EXACERBATION (HCC): ICD-10-CM

## 2021-11-17 DIAGNOSIS — R11.0 NAUSEA: Primary | ICD-10-CM

## 2021-11-17 DIAGNOSIS — I10 PRIMARY HYPERTENSION: ICD-10-CM

## 2021-11-17 DIAGNOSIS — I48.0 PAROXYSMAL ATRIAL FIBRILLATION (HCC): ICD-10-CM

## 2021-11-17 LAB
25(OH)D3+25(OH)D2 SERPL-MCNC: 22.6 NG/ML (ref 30–100)
ALBUMIN SERPL-MCNC: 4.4 G/DL (ref 3.5–5.2)
ALBUMIN/GLOB SERPL: 2.1 G/DL
ALP SERPL-CCNC: 93 U/L (ref 39–117)
ALT SERPL-CCNC: 14 U/L (ref 1–33)
APPEARANCE UR: ABNORMAL
AST SERPL-CCNC: 20 U/L (ref 1–32)
BACTERIA #/AREA URNS HPF: ABNORMAL /[HPF]
BASOPHILS # BLD AUTO: 0.1 X10E3/UL (ref 0–0.2)
BASOPHILS NFR BLD AUTO: 1 %
BILIRUB SERPL-MCNC: 0.2 MG/DL (ref 0–1.2)
BILIRUB UR QL STRIP: NEGATIVE
BUN SERPL-MCNC: 25 MG/DL (ref 8–23)
BUN/CREAT SERPL: 29.8 (ref 7–25)
CALCIUM SERPL-MCNC: 8.8 MG/DL (ref 8.6–10.5)
CASTS URNS MICRO: ABNORMAL
CASTS URNS QL MICRO: PRESENT /LPF
CHLORIDE SERPL-SCNC: 98 MMOL/L (ref 98–107)
CHOLEST SERPL-MCNC: 164 MG/DL (ref 0–200)
CK SERPL-CCNC: 126 U/L (ref 20–180)
CO2 SERPL-SCNC: 31.7 MMOL/L (ref 22–29)
COLOR UR: YELLOW
CREAT SERPL-MCNC: 0.84 MG/DL (ref 0.57–1)
EOSINOPHIL # BLD AUTO: 0.1 X10E3/UL (ref 0–0.4)
EOSINOPHIL NFR BLD AUTO: 2 %
EPI CELLS #/AREA URNS HPF: ABNORMAL /HPF (ref 0–10)
ERYTHROCYTE [DISTWIDTH] IN BLOOD BY AUTOMATED COUNT: 14.3 % (ref 11.7–15.4)
GLOBULIN SER CALC-MCNC: 2.1 GM/DL
GLUCOSE SERPL-MCNC: 92 MG/DL (ref 65–99)
GLUCOSE UR QL: NEGATIVE
HCT VFR BLD AUTO: 40.3 % (ref 34–46.6)
HDLC SERPL-MCNC: 71 MG/DL (ref 40–60)
HGB BLD-MCNC: 12.7 G/DL (ref 11.1–15.9)
HGB UR QL STRIP: ABNORMAL
IMM GRANULOCYTES # BLD AUTO: 0 X10E3/UL (ref 0–0.1)
IMM GRANULOCYTES NFR BLD AUTO: 0 %
KETONES UR QL STRIP: ABNORMAL
LDLC SERPL CALC-MCNC: 76 MG/DL (ref 0–100)
LEUKOCYTE ESTERASE UR QL STRIP: ABNORMAL
LYMPHOCYTES # BLD AUTO: 1.9 X10E3/UL (ref 0.7–3.1)
LYMPHOCYTES NFR BLD AUTO: 25 %
MCH RBC QN AUTO: 27.7 PG (ref 26.6–33)
MCHC RBC AUTO-ENTMCNC: 31.5 G/DL (ref 31.5–35.7)
MCV RBC AUTO: 88 FL (ref 79–97)
MICRO URNS: ABNORMAL
MONOCYTES # BLD AUTO: 0.5 X10E3/UL (ref 0.1–0.9)
MONOCYTES NFR BLD AUTO: 6 %
MUCOUS THREADS URNS QL MICRO: PRESENT
NEUTROPHILS # BLD AUTO: 5.2 X10E3/UL (ref 1.4–7)
NEUTROPHILS NFR BLD AUTO: 66 %
NITRITE UR QL STRIP: NEGATIVE
PH UR STRIP: 5.5 [PH] (ref 5–7.5)
PLATELET # BLD AUTO: 244 X10E3/UL (ref 150–450)
POTASSIUM SERPL-SCNC: 4.3 MMOL/L (ref 3.5–5.2)
PROT SERPL-MCNC: 6.5 G/DL (ref 6–8.5)
PROT UR QL STRIP: ABNORMAL
RBC # BLD AUTO: 4.59 X10E6/UL (ref 3.77–5.28)
RBC #/AREA URNS HPF: >30 /HPF (ref 0–2)
SODIUM SERPL-SCNC: 139 MMOL/L (ref 136–145)
SP GR UR: >=1.03 (ref 1–1.03)
TRIGL SERPL-MCNC: 92 MG/DL (ref 0–150)
TSH SERPL DL<=0.005 MIU/L-ACNC: 2.12 UIU/ML (ref 0.27–4.2)
UROBILINOGEN UR STRIP-MCNC: 1 MG/DL (ref 0.2–1)
VLDLC SERPL CALC-MCNC: 17 MG/DL (ref 5–40)
WBC # BLD AUTO: 7.8 X10E3/UL (ref 3.4–10.8)
WBC #/AREA URNS HPF: ABNORMAL /HPF (ref 0–5)

## 2021-11-17 PROCEDURE — 99214 OFFICE O/P EST MOD 30 MIN: CPT | Performed by: INTERNAL MEDICINE

## 2021-11-17 RX ORDER — BENZONATATE 200 MG/1
200 CAPSULE ORAL 3 TIMES DAILY PRN
Qty: 20 CAPSULE | Refills: 0 | Status: SHIPPED | OUTPATIENT
Start: 2021-11-17 | End: 2021-12-23

## 2021-11-17 RX ORDER — SULFAMETHOXAZOLE AND TRIMETHOPRIM 800; 160 MG/1; MG/1
1 TABLET ORAL 2 TIMES DAILY
Qty: 14 TABLET | Refills: 0 | Status: SHIPPED | OUTPATIENT
Start: 2021-11-17 | End: 2021-12-03

## 2021-11-17 RX ORDER — PROMETHAZINE HYDROCHLORIDE 25 MG/1
12.5 TABLET ORAL EVERY 6 HOURS PRN
Qty: 30 TABLET | Refills: 0 | OUTPATIENT
Start: 2021-11-17 | End: 2022-01-11

## 2021-11-17 NOTE — PROGRESS NOTES
Subjective   Gabi Dudley is a 76 y.o. female.     Chief Complaint   Patient presents with   • Hypertension   • Hyperlipidemia   • Anemia   • Abnormal coagulation profile   • Back Pain       History of Present Illness   Patient here for follow up preop . Still has a lot of back pain with T5 compression fracture pending kyphoplasty.labs are acceptable except PTT elevated. A.fib hx but now NSR. On eliquis now for CVA prophylaxis. Patient to check with cardiology regarding holding eliquis. Patient is also c/o nausea and cough due to pain and chest compression. Urine test is positive for UTI.     Current Outpatient Medications:   •  albuterol sulfate HFA (Ventolin HFA) 108 (90 Base) MCG/ACT inhaler, Inhale 2 puffs Every 4 (Four) Hours As Needed for Wheezing., Disp: 18 g, Rfl: 5  •  apixaban (Eliquis) 5 MG tablet tablet, Take 1 tablet by mouth Every 12 (Twelve) Hours., Disp: 60 tablet, Rfl: 4  •  atorvastatin (LIPITOR) 20 MG tablet, TAKE 1 TABLET BY MOUTH EVERY DAY, Disp: 90 tablet, Rfl: 3  •  Azelastine HCl 137 MCG/SPRAY solution, 1 spray into the nostril(s) as directed by provider 2 (Two) Times a Day., Disp: 30 mL, Rfl: 1  •  benzonatate (TESSALON) 200 MG capsule, Take 1 capsule by mouth 3 (Three) Times a Day As Needed for Cough., Disp: 20 capsule, Rfl: 0  •  Cholecalciferol (vitamin D3) 125 MCG (5000 UT) capsule capsule, Take 5,000 Units by mouth Daily., Disp: , Rfl:   •  clonazePAM (KlonoPIN) 1 MG tablet, Take 1 tablet by mouth Daily As Needed for Anxiety., Disp: 30 tablet, Rfl: 0  •  clotrimazole-betamethasone (Lotrisone) 1-0.05 % cream, Apply  topically to the appropriate area as directed 2 (Two) Times a Day., Disp: 45 g, Rfl: 1  •  DULoxetine (CYMBALTA) 60 MG capsule, TAKE ONE CAPSULE BY MOUTH TWICE DAILY, Disp: 180 capsule, Rfl: 3  •  HYDROcodone-acetaminophen (NORCO)  MG per tablet, Take 1 tablet by mouth 4 (Four) Times a Day As Needed., Disp: , Rfl:   •  ipratropium-albuterol (DUO-NEB) 0.5-2.5 mg/3 ml  nebulizer, Take 3 mL by nebulization Every 4 (Four) Hours As Needed for Wheezing., Disp: 360 mL, Rfl: 11  •  methocarbamol (ROBAXIN) 750 MG tablet, Take 1 tablet by mouth 2 (Two) Times a Day As Needed for Muscle Spasms., Disp: 30 tablet, Rfl: 0  •  O2 (OXYGEN), Inhale 2 L/min As Needed., Disp: , Rfl:   •  oxybutynin XL (DITROPAN-XL) 10 MG 24 hr tablet, TAKE 1 TABLET BY MOUTH DAILY, Disp: 90 tablet, Rfl: 1  •  oxyCODONE-acetaminophen (PERCOCET) 5-325 MG per tablet, TAKE 1/2 TO 1 TABLET BY MOUTH ONCE DAILY AS NEEDED FOR PAIN, Disp: , Rfl:   •  pantoprazole (PROTONIX) 40 MG EC tablet, TAKE 1 TABLET BY MOUTH DAILY, Disp: 90 tablet, Rfl: 3  •  promethazine (PHENERGAN) 25 MG tablet, Take 0.5 tablets by mouth Every 6 (Six) Hours As Needed for Nausea or Vomiting., Disp: 30 tablet, Rfl: 0  •  sertraline (ZOLOFT) 100 MG tablet, TAKE 1 AND 1/2 TABLETS BY MOUTH DAILY, Disp: 180 tablet, Rfl: 3  •  sulfamethoxazole-trimethoprim (Bactrim DS) 800-160 MG per tablet, Take 1 tablet by mouth 2 (Two) Times a Day., Disp: 14 tablet, Rfl: 0  •  tiotropium bromide-olodaterol (STIOLTO RESPIMAT) 2.5-2.5 MCG/ACT aerosol solution inhaler, Inhale 2 puffs Daily., Disp: 4 g, Rfl: 1  •  traZODone (DESYREL) 100 MG tablet, TAKE 1 TABLET BY MOUTH EVERY EVENING, Disp: 90 tablet, Rfl: 3    The following portions of the patient's history were reviewed and updated as appropriate: allergies, current medications, past family history, past medical history, past social history, past surgical history and problem list.    Review of Systems   Constitutional: Negative.    Respiratory: Negative.    Cardiovascular: Negative.    Gastrointestinal: Negative.    Musculoskeletal: Positive for back pain.   Skin: Negative.    Neurological: Negative.    Psychiatric/Behavioral: Positive for agitation. The patient is nervous/anxious.        Objective   Physical Exam  Constitutional:       General: She is in acute distress.   Cardiovascular:      Rate and Rhythm: Normal rate  and regular rhythm.      Heart sounds: Normal heart sounds.   Pulmonary:      Effort: Pulmonary effort is normal.      Breath sounds: Normal breath sounds.   Musculoskeletal:         General: Tenderness present.   Skin:     General: Skin is warm.   Neurological:      Mental Status: She is alert and oriented to person, place, and time.   Psychiatric:      Comments: In pain and crying         All tests have been reviewed.    Assessment/Plan   Diagnoses and all orders for this visit:    Nausea  -     promethazine (PHENERGAN) 25 MG tablet; Take 0.5 tablets by mouth Every 6 (Six) Hours As Needed for Nausea or Vomiting.    COPD with acute exacerbation (HCC)  -     benzonatate (TESSALON) 200 MG capsule; Take 1 capsule by mouth 3 (Three) Times a Day As Needed for Cough.    Urinary tract infection without hematuria, site unspecified  -     sulfamethoxazole-trimethoprim (Bactrim DS) 800-160 MG per tablet; Take 1 tablet by mouth 2 (Two) Times a Day.    Primary hypertension    Paroxysmal atrial fibrillation (HCC) on eliquis and pending to see cardio for clearance for kyphoplasty.     Compression fracture of T5 vertebra with routine healing, subsequent encounter on pain med.              T5 compression fracture pending kyphosis.  Asked patient to liberalize narcotic pain medicine use   Upon approval by cardiologist for discontinuation of Eliquis before surgery.  Patient states it is difficult to get hold of cardiologist.  Patient's pain is very severe.  Reviewed patient's cardiologist visit in October showed patient was in normal sinus rhythm and Eliquis was mainly for prophylaxis of CVA.  I would clear for patient to have surgery after discontinue Eliquis for 3 days.  PTT elevated probably due to Eliquis.   Nausea initiate medication  Cough refill medication  UTI initiate medication  osteoporosis pending to see rheum  Patient is on oxygen now due to T5 compression and a history of COPD.   cont nebulizer.

## 2021-11-24 RX ORDER — AZELASTINE HYDROCHLORIDE 137 UG/1
1 SPRAY, METERED NASAL 2 TIMES DAILY
Qty: 30 ML | Refills: 5 | Status: SHIPPED | OUTPATIENT
Start: 2021-11-24 | End: 2023-01-26 | Stop reason: HOSPADM

## 2021-11-24 NOTE — TELEPHONE ENCOUNTER
Rx Refill Note  Requested Prescriptions     Pending Prescriptions Disp Refills   • Azelastine HCl 137 MCG/SPRAY solution 30 mL 1     Si spray into the nostril(s) as directed by provider 2 (Two) Times a Day.      Last office visit with prescribing clinician: 2021      Next office visit with prescribing clinician: 1/3/2022            Radha Aly LPN  21, 17:29 EST

## 2021-11-24 NOTE — TELEPHONE ENCOUNTER
Caller: [facility] #[id number] - [place] - [address] AT [facility] [address] - [phone] PH - [phone] FX    Relationship: Pharmacy    Best call back number: [phone]    Requested Prescriptions:   Requested Prescriptions     Pending Prescriptions Disp Refills   • Azelastine HCl 137 MCG/SPRAY solution 30 mL 1     Si spray into the nostril(s) as directed by provider 2 (Two) Times a Day.        Pharmacy where request should be sent: [facility] #[id number] - [place] - [address] AT [facility] [address] - [phone] PH - [phone] FX     Additional details provided by patient: PATIENT IS OUT OF MEDICATION     Does the patient have less than a 3 day supply:  [x] Yes  [] No    Rafa Spencer Rep   21 14:44 EST

## 2021-12-02 DIAGNOSIS — F41.9 ANXIETY: ICD-10-CM

## 2021-12-02 DIAGNOSIS — M54.6 THORACIC BACK PAIN, UNSPECIFIED BACK PAIN LATERALITY, UNSPECIFIED CHRONICITY: ICD-10-CM

## 2021-12-02 RX ORDER — CLONAZEPAM 1 MG/1
1 TABLET ORAL DAILY PRN
Qty: 30 TABLET | Refills: 0 | Status: SHIPPED | OUTPATIENT
Start: 2021-12-02 | End: 2022-01-14

## 2021-12-02 RX ORDER — METHOCARBAMOL 750 MG/1
750 TABLET, FILM COATED ORAL 2 TIMES DAILY PRN
Qty: 30 TABLET | Refills: 0 | OUTPATIENT
Start: 2021-12-02 | End: 2022-01-11

## 2021-12-02 NOTE — TELEPHONE ENCOUNTER
Caller: Debra Aguilarly    Relationship: Emergency Contact    Best call back number: [phone]    Requested Prescriptions:   Requested Prescriptions     Pending Prescriptions Disp Refills   • methocarbamol (ROBAXIN) 750 MG tablet 30 tablet 0     Sig: Take 1 tablet by mouth 2 (Two) Times a Day As Needed for Muscle Spasms.   • clonazePAM (KlonoPIN) 1 MG tablet 30 tablet 0     Sig: Take 1 tablet by mouth Daily As Needed for Anxiety.        Pharmacy where request should be sent: [id number] [address] AT [facility] [employer] [facility] - [phone]  - [phone] FX     Additional details provided by patient: PATIENT IS OUT OF ROBAXIN AND HAS ABOUT 1 WEEK REMAINING ON KLONOPIN.     Does the patient have less than a 3 day supply:  [x] Yes  [] No    Rafa Marie Rep   12/02/21 12:08 EST

## 2021-12-02 NOTE — TELEPHONE ENCOUNTER
Rx Refill Note  Requested Prescriptions     Pending Prescriptions Disp Refills   • methocarbamol (ROBAXIN) 750 MG tablet 30 tablet 0     Sig: Take 1 tablet by mouth 2 (Two) Times a Day As Needed for Muscle Spasms.   • clonazePAM (KlonoPIN) 1 MG tablet 30 tablet 0     Sig: Take 1 tablet by mouth Daily As Needed for Anxiety.      Last office visit with prescribing clinician: 11/17/2021      Next office visit with prescribing clinician: 1/3/2022            JARON GRIFFIN MA  12/02/21, 13:56 EST     CSA: N/A  UDS: N/A

## 2021-12-03 ENCOUNTER — OFFICE VISIT (OUTPATIENT)
Dept: INTERNAL MEDICINE | Facility: CLINIC | Age: 76
End: 2021-12-03

## 2021-12-03 DIAGNOSIS — S22.050G COMPRESSION FRACTURE OF T5 VERTEBRA WITH DELAYED HEALING, SUBSEQUENT ENCOUNTER: Primary | ICD-10-CM

## 2021-12-03 DIAGNOSIS — J44.1 COPD WITH ACUTE EXACERBATION (HCC): ICD-10-CM

## 2021-12-03 DIAGNOSIS — J43.9 PULMONARY EMPHYSEMA, UNSPECIFIED EMPHYSEMA TYPE (HCC): ICD-10-CM

## 2021-12-03 PROCEDURE — 99215 OFFICE O/P EST HI 40 MIN: CPT | Performed by: NURSE PRACTITIONER

## 2021-12-03 PROCEDURE — 96372 THER/PROPH/DIAG INJ SC/IM: CPT | Performed by: NURSE PRACTITIONER

## 2021-12-03 RX ORDER — IPRATROPIUM BROMIDE AND ALBUTEROL SULFATE 2.5; .5 MG/3ML; MG/3ML
3 SOLUTION RESPIRATORY (INHALATION) ONCE
Status: COMPLETED | OUTPATIENT
Start: 2021-12-03 | End: 2021-12-03

## 2021-12-03 RX ORDER — DOXYCYCLINE HYCLATE 100 MG/1
100 CAPSULE ORAL 2 TIMES DAILY
Qty: 14 CAPSULE | Refills: 0 | Status: SHIPPED | OUTPATIENT
Start: 2021-12-03 | End: 2021-12-10

## 2021-12-03 RX ORDER — METHYLPREDNISOLONE SODIUM SUCCINATE 40 MG/ML
40 INJECTION, POWDER, LYOPHILIZED, FOR SOLUTION INTRAMUSCULAR; INTRAVENOUS ONCE
Status: COMPLETED | OUTPATIENT
Start: 2021-12-03 | End: 2021-12-03

## 2021-12-03 RX ORDER — PREDNISONE 20 MG/1
40 TABLET ORAL DAILY
Qty: 10 TABLET | Refills: 0 | Status: SHIPPED | OUTPATIENT
Start: 2021-12-03 | End: 2021-12-08

## 2021-12-03 RX ADMIN — METHYLPREDNISOLONE SODIUM SUCCINATE 40 MG: 40 INJECTION, POWDER, LYOPHILIZED, FOR SOLUTION INTRAMUSCULAR; INTRAVENOUS at 18:26

## 2021-12-03 RX ADMIN — IPRATROPIUM BROMIDE AND ALBUTEROL SULFATE 3 ML: 2.5; .5 SOLUTION RESPIRATORY (INHALATION) at 18:27

## 2021-12-03 NOTE — PROGRESS NOTES
Office Visit      Patient Name: Gabi Dudley  : 1945   MRN: [id number]   Care Team: Patient Care Team:  Paul Quinteros MD as PCP - General (Internal Medicine)  Sima Moses MD as Consulting Physician (General Surgery)  Taiwo Curry MD as Consulting Physician (Cardiology)    Chief Complaint  Spinal fracture (needs referral to ortho spine in [place] )    Subjective     Subjective      Gabi Dudley presents to [facility] for referral. Known compression fracture of T5 and was seeing Dr. Kapadia for management. Attempted kyphoplasty and unsuccessful as she was not able to be positioned on the table with adequate oxygen saturation to perform the procedure. Daughter is here today helping with history and not satisfied with care at recent orthopedic office. She is requesting referral to [place] for second opinion.   Continues to have severe back pain despite seeing pain management and being on narcotics. It is hard for her to take deep breaths and especially difficult to cough. Prior to fracture she had been on 2L/NC as needed for many years that is now needed continuously for most of the day. She feels as though she can't get a deep breath because of the severity of her pain. Has a severe cough today but unable to cough anything up because she states she does not have the strength.   Endorses wheezing, shortness of breath, cough, and productive yellow sputum when she is able to cough.   Denies chest pain other than obvious posterior pain from fracture. Currently in a back brace. She has not used any nebulizer treatments today.     Review of Systems   Constitutional: Positive for fatigue. Negative for chills and fever.   HENT: Positive for congestion. Negative for postnasal drip, sinus pressure, sneezing, sore throat, swollen glands and trouble swallowing.    Respiratory: Positive for cough, chest tightness, shortness of breath and wheezing.    Cardiovascular:  "Negative for chest pain.   Gastrointestinal: Negative for abdominal pain, diarrhea, nausea and vomiting.   Musculoskeletal: Positive for arthralgias and back pain.   Neurological: Negative for headache.   Psychiatric/Behavioral: Positive for sleep disturbance.       Objective     Objective   Vital Signs:   /84   Pulse 100   Temp 96.6 °F (35.9 °C) (Temporal)   Ht 165.1 cm (65\")   SpO2 95% Comment: 2L/NC continuously after nebulizer treatment  BMI 27.29 kg/m²     Physical Exam  Vitals and nursing note reviewed.   Constitutional:       General: She is not in acute distress.     Appearance: She is ill-appearing (acute on chronically ill appearing elderly woman). She is not toxic-appearing.   HENT:      Right Ear: No middle ear effusion. Tympanic membrane is not bulging.      Left Ear:  No middle ear effusion. Tympanic membrane is not bulging.      Nose: Nose normal. No congestion or rhinorrhea.      Right Sinus: No maxillary sinus tenderness or frontal sinus tenderness.      Left Sinus: No maxillary sinus tenderness or frontal sinus tenderness.      Mouth/Throat:      Mouth: Mucous membranes are moist.      Pharynx: No posterior oropharyngeal erythema.   Eyes:      Pupils: Pupils are equal, round, and reactive to light.   Cardiovascular:      Rate and Rhythm: Normal rate and regular rhythm.      Heart sounds: Normal heart sounds. No murmur heard.      Pulmonary:      Effort: Tachypnea and accessory muscle usage present. No respiratory distress.      Breath sounds: Decreased air movement (tightness noted throughout lung fields prior to neb) present. Wheezing and rales present.   Musculoskeletal:      Cervical back: Neck supple. No tenderness.      Comments: Back brace in place, unable to fully assess ROM due to severity of pain and respiratory status     Lymphadenopathy:      Head:      Right side of head: No submental, submandibular or tonsillar adenopathy.      Left side of head: No submental, submandibular " or tonsillar adenopathy.      Cervical: No cervical adenopathy.   Skin:     General: Skin is warm and dry.      Findings: No rash.   Neurological:      Mental Status: She is alert.   Psychiatric:         Mood and Affect: Mood normal.         Behavior: Behavior normal.          Assessment / Plan      Assessment/Plan   Problem List Items Addressed This Visit        Neuro    Compression fracture of T5 vertebra (HCC) - Primary    Relevant Orders    Ambulatory Referral to Orthopedic Surgery    Continues to have severe pain despite adequate pain management and conservative measures, now effecting respiratory status. Referral placed for second opinion on surgical intervention. Recommend following up with pain management and keep appointment with ortho. Try to remain as active as possible.        Pulmonary and Pneumonias    COPD with acute exacerbation (HCC)    Relevant Medications    predniSONE (DELTASONE) 20 MG tablet    Nebulizer treatment given in office today with moderate improvement in respiratory status. IM solu-medrol followed by steroid taper beginning tomorrow in addition to 7 day course of doxycycline BID and mucinex BID. Albuterol as needed. Discussed side effects of medication and recommend taking with food. Continuous oxygen, monitor 02 saturation at home. Her and daughter have both been educated to go to the ED if she is not improving over the weekend or acutely worsens and they verbalize understanding. She is high risk for worsening due to the severity of her pain coupled with increase in oxygen requirement and I have expressed these concerns with them in the office today.         I spent 59 minutes caring for Gabi on this date of service. This time includes time spent by me in the following activities:preparing for the visit, performing a medically appropriate examination and/or evaluation , counseling and educating the patient/family/caregiver, ordering medications, tests, or procedures, referring and  communicating with other health care professionals  and documenting information in the medical record     Follow Up   Return in about 1 week (around 12/10/2021) for Next scheduled follow up.  Patient was given instructions and counseling regarding her condition or for health maintenance advice. Please see specific information pulled into the AVS if appropriate.     REGINA Fortune  [facility]

## 2021-12-05 VITALS
HEIGHT: 65 IN | DIASTOLIC BLOOD PRESSURE: 84 MMHG | BODY MASS INDEX: 27.29 KG/M2 | HEART RATE: 100 BPM | TEMPERATURE: 96.6 F | SYSTOLIC BLOOD PRESSURE: 124 MMHG | OXYGEN SATURATION: 95 %

## 2021-12-06 RX ORDER — IPRATROPIUM BROMIDE AND ALBUTEROL SULFATE 2.5; .5 MG/3ML; MG/3ML
SOLUTION RESPIRATORY (INHALATION)
Qty: 360 ML | Refills: 11 | Status: SHIPPED | OUTPATIENT
Start: 2021-12-06 | End: 2023-01-19 | Stop reason: SDUPTHER

## 2021-12-06 NOTE — TELEPHONE ENCOUNTER
Rx Refill Note  Requested Prescriptions     Pending Prescriptions Disp Refills   • ipratropium-albuterol (DUO-NEB) 0.5-2.5 mg/3 ml nebulizer [Pharmacy Med Name: IPRATROPI/ALB 0.5/3MG INH SL 30X3ML] 360 mL 11     Sig: USE 3 ML VIA NEBULIZER EVERY 4 HOURS AS NEEDED FOR WHEEZING      Last office visit with prescribing clinician: 11/17/2021      Next office visit with prescribing clinician: 1/3/2022            JARON GRIFFIN MA  12/06/21, 09:06 EST

## 2021-12-14 ENCOUNTER — APPOINTMENT (OUTPATIENT)
Dept: CT IMAGING | Facility: HOSPITAL | Age: 76
End: 2021-12-14

## 2021-12-14 ENCOUNTER — HOSPITAL ENCOUNTER (EMERGENCY)
Facility: HOSPITAL | Age: 76
Discharge: HOME | End: 2021-12-15
Attending: FAMILY MEDICINE | Admitting: FAMILY MEDICINE

## 2021-12-14 DIAGNOSIS — J44.1 COPD WITH ACUTE EXACERBATION (HCC): Primary | ICD-10-CM

## 2021-12-14 DIAGNOSIS — R19.7 DIARRHEA, UNSPECIFIED TYPE: Primary | ICD-10-CM

## 2021-12-14 LAB
ADV 40+41 DNA STL QL NAA+NON-PROBE: NOT DETECTED
ALBUMIN SERPL-MCNC: 4.2 G/DL (ref 3.5–5.2)
ALBUMIN/GLOB SERPL: 1.2 G/DL
ALP SERPL-CCNC: 141 U/L (ref 39–117)
ALT SERPL W P-5'-P-CCNC: 12 U/L (ref 1–33)
ANION GAP SERPL CALCULATED.3IONS-SCNC: 12.2 MMOL/L (ref 5–15)
AST SERPL-CCNC: 22 U/L (ref 1–32)
ASTRO TYP 1-8 RNA STL QL NAA+NON-PROBE: NOT DETECTED
BACTERIA UR QL AUTO: ABNORMAL /HPF
BASOPHILS # BLD AUTO: 0.03 10*3/MM3 (ref 0–0.2)
BASOPHILS NFR BLD AUTO: 0.2 % (ref 0–1.5)
BILIRUB SERPL-MCNC: 0.4 MG/DL (ref 0–1.2)
BILIRUB UR QL STRIP: NEGATIVE
BUN SERPL-MCNC: 17 MG/DL (ref 8–23)
BUN/CREAT SERPL: 28.3 (ref 7–25)
C CAYETANENSIS DNA STL QL NAA+NON-PROBE: NOT DETECTED
C COLI+JEJ+UPSA DNA STL QL NAA+NON-PROBE: NOT DETECTED
CALCIUM SPEC-SCNC: 9.9 MG/DL (ref 8.6–10.5)
CHLORIDE SERPL-SCNC: 99 MMOL/L (ref 98–107)
CLARITY UR: CLEAR
CO2 SERPL-SCNC: 27.8 MMOL/L (ref 22–29)
COD CRY URNS QL: ABNORMAL /HPF
COLOR UR: YELLOW
CREAT SERPL-MCNC: 0.6 MG/DL (ref 0.57–1)
CRYPTOSP DNA STL QL NAA+NON-PROBE: NOT DETECTED
DEPRECATED RDW RBC AUTO: 48.1 FL (ref 37–54)
E HISTOLYT DNA STL QL NAA+NON-PROBE: NOT DETECTED
EAEC PAA PLAS AGGR+AATA ST NAA+NON-PRB: NOT DETECTED
EC STX1+STX2 GENES STL QL NAA+NON-PROBE: NOT DETECTED
EOSINOPHIL # BLD AUTO: 0.01 10*3/MM3 (ref 0–0.4)
EOSINOPHIL NFR BLD AUTO: 0.1 % (ref 0.3–6.2)
EPEC EAE GENE STL QL NAA+NON-PROBE: NOT DETECTED
ERYTHROCYTE [DISTWIDTH] IN BLOOD BY AUTOMATED COUNT: 14.6 % (ref 12.3–15.4)
ETEC LTA+ST1A+ST1B TOX ST NAA+NON-PROBE: NOT DETECTED
G LAMBLIA DNA STL QL NAA+NON-PROBE: NOT DETECTED
GFR SERPL CREATININE-BSD FRML MDRD: 98 ML/MIN/1.73
GLOBULIN UR ELPH-MCNC: 3.5 GM/DL
GLUCOSE SERPL-MCNC: 125 MG/DL (ref 65–99)
GLUCOSE UR STRIP-MCNC: NEGATIVE MG/DL
HCT VFR BLD AUTO: 47.3 % (ref 34–46.6)
HGB BLD-MCNC: 15 G/DL (ref 12–15.9)
HGB UR QL STRIP.AUTO: NEGATIVE
HOLD SPECIMEN: NORMAL
HOLD SPECIMEN: NORMAL
HYALINE CASTS UR QL AUTO: ABNORMAL /LPF
IMM GRANULOCYTES # BLD AUTO: 0.06 10*3/MM3 (ref 0–0.05)
IMM GRANULOCYTES NFR BLD AUTO: 0.5 % (ref 0–0.5)
KETONES UR QL STRIP: ABNORMAL
LEUKOCYTE ESTERASE UR QL STRIP.AUTO: ABNORMAL
LIPASE SERPL-CCNC: 27 U/L (ref 13–60)
LYMPHOCYTES # BLD AUTO: 1.17 10*3/MM3 (ref 0.7–3.1)
LYMPHOCYTES NFR BLD AUTO: 9.6 % (ref 19.6–45.3)
MCH RBC QN AUTO: 28.7 PG (ref 26.6–33)
MCHC RBC AUTO-ENTMCNC: 31.7 G/DL (ref 31.5–35.7)
MCV RBC AUTO: 90.6 FL (ref 79–97)
MONOCYTES # BLD AUTO: 0.68 10*3/MM3 (ref 0.1–0.9)
MONOCYTES NFR BLD AUTO: 5.6 % (ref 5–12)
MUCOUS THREADS URNS QL MICRO: ABNORMAL /HPF
NEUTROPHILS NFR BLD AUTO: 10.2 10*3/MM3 (ref 1.7–7)
NEUTROPHILS NFR BLD AUTO: 84 % (ref 42.7–76)
NITRITE UR QL STRIP: NEGATIVE
NOROVIRUS GI+II RNA STL QL NAA+NON-PROBE: NOT DETECTED
NRBC BLD AUTO-RTO: 0 /100 WBC (ref 0–0.2)
P SHIGELLOIDES DNA STL QL NAA+NON-PROBE: NOT DETECTED
PH UR STRIP.AUTO: 8 [PH] (ref 5–8)
PLATELET # BLD AUTO: 271 10*3/MM3 (ref 140–450)
PMV BLD AUTO: 10 FL (ref 6–12)
POTASSIUM SERPL-SCNC: 4.2 MMOL/L (ref 3.5–5.2)
PROT SERPL-MCNC: 7.7 G/DL (ref 6–8.5)
PROT UR QL STRIP: ABNORMAL
RBC # BLD AUTO: 5.22 10*6/MM3 (ref 3.77–5.28)
RBC # UR STRIP: ABNORMAL /HPF
REF LAB TEST METHOD: ABNORMAL
RVA RNA STL QL NAA+NON-PROBE: NOT DETECTED
S ENT+BONG DNA STL QL NAA+NON-PROBE: NOT DETECTED
SAPO I+II+IV+V RNA STL QL NAA+NON-PROBE: NOT DETECTED
SHIGELLA SP+EIEC IPAH ST NAA+NON-PROBE: NOT DETECTED
SODIUM SERPL-SCNC: 139 MMOL/L (ref 136–145)
SP GR UR STRIP: 1.02 (ref 1–1.03)
SQUAMOUS #/AREA URNS HPF: ABNORMAL /HPF
UROBILINOGEN UR QL STRIP: ABNORMAL
V CHOL+PARA+VUL DNA STL QL NAA+NON-PROBE: NOT DETECTED
V CHOLERAE DNA STL QL NAA+NON-PROBE: NOT DETECTED
WBC # UR STRIP: ABNORMAL /HPF
WBC NRBC COR # BLD: 12.15 10*3/MM3 (ref 3.4–10.8)
WHOLE BLOOD HOLD SPECIMEN: NORMAL
WHOLE BLOOD HOLD SPECIMEN: NORMAL
Y ENTEROCOL DNA STL QL NAA+NON-PROBE: NOT DETECTED

## 2021-12-14 PROCEDURE — 85025 COMPLETE CBC W/AUTO DIFF WBC: CPT | Performed by: FAMILY MEDICINE

## 2021-12-14 PROCEDURE — 0 MORPHINE SULFATE 4 MG/ML SOLUTION: Performed by: FAMILY MEDICINE

## 2021-12-14 PROCEDURE — 83690 ASSAY OF LIPASE: CPT | Performed by: FAMILY MEDICINE

## 2021-12-14 PROCEDURE — 81001 URINALYSIS AUTO W/SCOPE: CPT | Performed by: FAMILY MEDICINE

## 2021-12-14 PROCEDURE — 0097U HC BIOFIRE FILMARRAY GI PANEL: CPT | Performed by: PHYSICIAN ASSISTANT

## 2021-12-14 PROCEDURE — 25010000002 ONDANSETRON PER 1 MG: Performed by: FAMILY MEDICINE

## 2021-12-14 PROCEDURE — 99284 EMERGENCY DEPT VISIT MOD MDM: CPT

## 2021-12-14 PROCEDURE — 74177 CT ABD & PELVIS W/CONTRAST: CPT

## 2021-12-14 PROCEDURE — 96374 THER/PROPH/DIAG INJ IV PUSH: CPT

## 2021-12-14 PROCEDURE — 25010000002 IOPAMIDOL 61 % SOLUTION: Performed by: FAMILY MEDICINE

## 2021-12-14 PROCEDURE — 96375 TX/PRO/DX INJ NEW DRUG ADDON: CPT

## 2021-12-14 PROCEDURE — 96361 HYDRATE IV INFUSION ADD-ON: CPT

## 2021-12-14 PROCEDURE — 80053 COMPREHEN METABOLIC PANEL: CPT | Performed by: FAMILY MEDICINE

## 2021-12-14 RX ORDER — ONDANSETRON 2 MG/ML
4 INJECTION INTRAMUSCULAR; INTRAVENOUS ONCE
Status: COMPLETED | OUTPATIENT
Start: 2021-12-14 | End: 2021-12-14

## 2021-12-14 RX ORDER — SODIUM CHLORIDE 0.9 % (FLUSH) 0.9 %
10 SYRINGE (ML) INJECTION AS NEEDED
Status: DISCONTINUED | OUTPATIENT
Start: 2021-12-14 | End: 2021-12-15 | Stop reason: HOSPADM

## 2021-12-14 RX ORDER — LOPERAMIDE HYDROCHLORIDE 2 MG/1
2 CAPSULE ORAL 4 TIMES DAILY PRN
Qty: 20 CAPSULE | Refills: 0 | Status: SHIPPED | OUTPATIENT
Start: 2021-12-14 | End: 2023-01-26 | Stop reason: HOSPADM

## 2021-12-14 RX ORDER — DIPHENOXYLATE HYDROCHLORIDE AND ATROPINE SULFATE 2.5; .025 MG/1; MG/1
1 TABLET ORAL ONCE
Status: COMPLETED | OUTPATIENT
Start: 2021-12-14 | End: 2021-12-14

## 2021-12-14 RX ORDER — ALBUTEROL SULFATE 90 UG/1
AEROSOL, METERED RESPIRATORY (INHALATION)
Qty: 18 G | Refills: 5 | Status: SHIPPED | OUTPATIENT
Start: 2021-12-14 | End: 2023-03-08

## 2021-12-14 RX ORDER — MORPHINE SULFATE 4 MG/ML
4 INJECTION, SOLUTION INTRAMUSCULAR; INTRAVENOUS ONCE
Status: COMPLETED | OUTPATIENT
Start: 2021-12-14 | End: 2021-12-14

## 2021-12-14 RX ADMIN — DIPHENOXYLATE HYDROCHLORIDE AND ATROPINE SULFATE 1 TABLET: 2.5; .025 TABLET ORAL at 23:19

## 2021-12-14 RX ADMIN — SODIUM CHLORIDE 1000 ML: 9 INJECTION, SOLUTION INTRAVENOUS at 22:41

## 2021-12-14 RX ADMIN — ONDANSETRON 4 MG: 2 INJECTION INTRAMUSCULAR; INTRAVENOUS at 23:19

## 2021-12-14 RX ADMIN — MORPHINE SULFATE 4 MG: 4 INJECTION INTRAVENOUS at 23:21

## 2021-12-14 RX ADMIN — IOPAMIDOL 100 ML: 612 INJECTION, SOLUTION INTRAVENOUS at 21:31

## 2021-12-14 NOTE — TELEPHONE ENCOUNTER
Rx Refill Note  Requested Prescriptions     Pending Prescriptions Disp Refills   • albuterol sulfate  (90 Base) MCG/ACT inhaler [Pharmacy Med Name: ALBUTEROL HFA INH(200 PUFFS)18GM] 18 g 5     Sig: INHALE 2 PUFFS BY MOUTH EVERY 4 HOURS AS NEEDED FOR WHEEZING      Last office visit with prescribing clinician: 11/17/2021      Next office visit with prescribing clinician: 1/3/2022            JARON GRIFFIN MA  12/14/21, 17:28 EST

## 2021-12-15 VITALS
HEIGHT: 65 IN | WEIGHT: 150 LBS | DIASTOLIC BLOOD PRESSURE: 98 MMHG | RESPIRATION RATE: 18 BRPM | SYSTOLIC BLOOD PRESSURE: 142 MMHG | TEMPERATURE: 98.1 F | HEART RATE: 88 BPM | OXYGEN SATURATION: 98 % | BODY MASS INDEX: 24.99 KG/M2

## 2021-12-15 NOTE — ED PROVIDER NOTES
Subjective   76-year-old female presents with diarrhea, she said diarrhea for 2 days, multiple episodes over the last several days. She has some lower abdominal cramping. She was recently on doxycycline for bronchitis for 7 days and the diarrhea started 2 days ago. The symptoms are worse with any eating or drinking.      History provided by:  Patient   used: No        Review of Systems   Gastrointestinal: Positive for abdominal pain, diarrhea and nausea.   All other systems reviewed and are negative.      Past Medical History:   Diagnosis Date   • Adrenal adenoma    • Anemia    • Arrhythmia    • Asthma    • Atrial fibrillation (HCC)    • Back pain    • Benign colonic polyp    • Benign tumor of adrenal gland    • Cataract     bilateral   • Cholelithiasis    • Chronic bronchitis (HCC)    • Chronic bronchitis with COPD (chronic obstructive pulmonary disease) (HCC)    • COPD (chronic obstructive pulmonary disease) (HCC) 2005   • Coronary artery disease    • Depression    • Elevated cholesterol    • Environmental and seasonal allergies    • Fibromyalgia    • Gastritis    • Generalized anxiety disorder    • GERD (gastroesophageal reflux disease)    • H/O mammogram    • Hemorrhoids    • History of blood transfusion 1985   • History of blood transfusion 1985   • History of echocardiogram    • History of endometriosis    • History of nuclear stress test    • Hypertension    • IBS (irritable bowel syndrome)    • Impaired functional mobility, balance, gait, and endurance    • Inverted nipple    • Kidney disease    • Kidney stone    • Liver cyst    • Nodular radiologic density    • Nodule of left lung    • On home oxygen therapy     2 liters NC QHS   • Osteoarthritis    • Osteoporosis    • PONV (postoperative nausea and vomiting)    • Renal cyst    • Sinus problem     2014   • Sinusitis    • Skin cancer     basal cell carcinoma   • SOB (shortness of breath)    • Tobacco use    • Urinary frequency    •  Vitamin D deficiency    • Wears glasses    • Wears partial dentures     upper plate       No Known Allergies    Past Surgical History:   Procedure Laterality Date   • APPENDECTOMY     • CARDIAC CATHETERIZATION     • CATARACT EXTRACTION      both eyes   • CHOLECYSTECTOMY WITH INTRAOPERATIVE CHOLANGIOGRAM N/A 10/30/2020    Procedure: CHOLECYSTECTOMY LAPAROSCOPIC INTRAOPERATIVE CHOLANGIOGRAPHY;  Surgeon: Sima Moses MD;  Location: [facility] OR;  Service: General;  Laterality: N/A;   • COLONOSCOPY  2013   • COLONOSCOPY  2016   • COLONOSCOPY N/A 2019    Procedure: COLONOSCOPY W/ COLD FORCEP POLYPECTOMIES; HOT SNARE POLYPECTOMIES; COLD SNARE POLYPECTOMY;  Surgeon: Goyo Nunez MD;  Location: [facility] ENDOSCOPY;  Service: Gastroenterology   • COLONOSCOPY W/ BIOPSIES AND POLYPECTOMY     • EXPLORATORY LAPAROTOMY N/A 2020    Procedure: colectomy, right, closure of enterotomy x 2, reduction of internal volvulus;  Surgeon: Sima Moses MD;  Location: [facility];  Service: General;  Laterality: N/A;   • HYSTERECTOMY      partial   • LYSIS OF ABDOMINAL ADHESIONS     • TONSILLECTOMY     • UPPER GASTROINTESTINAL ENDOSCOPY  2015   • VAGINAL DELIVERY      x2       Family History   Problem Relation Age of Onset   • Stomach cancer Brother    • Colon cancer Maternal Aunt    • Brain cancer Father    • Arthritis Father    • Hypertension Father    • Lung cancer Paternal Grandfather    • Breast cancer Neg Hx    • Ovarian cancer Neg Hx        Social History     Socioeconomic History   • Marital status:    Tobacco Use   • Smoking status: Former Smoker     Packs/day: 0.25     Years: 30.00     Pack years: 7.50     Types: Cigarettes     Quit date: 2020     Years since quittin.1   • Smokeless tobacco: Never Used   Vaping Use   • Vaping Use: Never used   Substance and Sexual Activity   • Alcohol use: No   • Drug use: No   • Sexual activity: Defer           Objective    Physical Exam  Vitals and nursing note reviewed.   Constitutional:       Appearance: She is well-developed.   HENT:      Head: Normocephalic.   Cardiovascular:      Rate and Rhythm: Normal rate and regular rhythm.   Pulmonary:      Effort: Pulmonary effort is normal.      Breath sounds: Normal breath sounds.   Abdominal:      General: Bowel sounds are normal.      Palpations: Abdomen is soft.   Musculoskeletal:         General: Normal range of motion.      Cervical back: Normal range of motion and neck supple.   Skin:     General: Skin is warm and dry.   Neurological:      Mental Status: She is alert and oriented to person, place, and time.      Deep Tendon Reflexes: Reflexes are normal and symmetric.         Procedures           ED Course                                                 MDM  Number of Diagnoses or Management Options  Diarrhea, unspecified type: new and requires workup     Amount and/or Complexity of Data Reviewed  Clinical lab tests: reviewed    Risk of Complications, Morbidity, and/or Mortality  Presenting problems: low  Diagnostic procedures: minimal  Management options: low    Patient Progress  Patient progress: stable      Final diagnoses:   Diarrhea, unspecified type       ED Disposition  ED Disposition     ED Disposition Condition Comment    Discharge Stable           Paul Quinteros MD  [address]  [facility] [address] [phone]    Schedule an appointment as soon as possible for a visit       [facility] Emergency Department  [address] [address] [phone]    If symptoms worsen         Medication List      New Prescriptions    loperamide 2 MG capsule  Commonly known as: IMODIUM  Take 1 capsule by mouth 4 (Four) Times a Day As Needed for Diarrhea.           Where to Get Your Medications      These medications were sent to [id number] - [facility] - [address] [facility] [employer] [place]   - [phone]  - [phone] FX  [address] [facility] [address]    Phone: [phone]   · loperamide 2 MG capsule          Maximo Narayanan Jr., PA-C  12/14/21 [id number]

## 2021-12-23 ENCOUNTER — OFFICE VISIT (OUTPATIENT)
Dept: INTERNAL MEDICINE | Facility: CLINIC | Age: 76
End: 2021-12-23

## 2021-12-23 VITALS
OXYGEN SATURATION: 91 % | HEART RATE: 109 BPM | WEIGHT: 138 LBS | HEIGHT: 65 IN | TEMPERATURE: 96.6 F | SYSTOLIC BLOOD PRESSURE: 122 MMHG | BODY MASS INDEX: 22.99 KG/M2 | DIASTOLIC BLOOD PRESSURE: 80 MMHG

## 2021-12-23 DIAGNOSIS — J43.8 OTHER EMPHYSEMA (HCC): ICD-10-CM

## 2021-12-23 DIAGNOSIS — M81.0 OSTEOPOROSIS, UNSPECIFIED OSTEOPOROSIS TYPE, UNSPECIFIED PATHOLOGICAL FRACTURE PRESENCE: ICD-10-CM

## 2021-12-23 DIAGNOSIS — R91.1 LUNG NODULE: ICD-10-CM

## 2021-12-23 DIAGNOSIS — S22.050G COMPRESSION FRACTURE OF T5 VERTEBRA WITH DELAYED HEALING, SUBSEQUENT ENCOUNTER: ICD-10-CM

## 2021-12-23 DIAGNOSIS — I48.0 PAROXYSMAL ATRIAL FIBRILLATION (HCC): ICD-10-CM

## 2021-12-23 DIAGNOSIS — I10 PRIMARY HYPERTENSION: Primary | ICD-10-CM

## 2021-12-23 DIAGNOSIS — D35.00 ADRENAL ADENOMA, UNSPECIFIED LATERALITY: ICD-10-CM

## 2021-12-23 DIAGNOSIS — K59.1 FUNCTIONAL DIARRHEA: ICD-10-CM

## 2021-12-23 PROBLEM — N39.0 URINARY TRACT INFECTION WITHOUT HEMATURIA: Status: RESOLVED | Noted: 2017-01-17 | Resolved: 2021-12-23

## 2021-12-23 PROBLEM — J41.0 SIMPLE CHRONIC BRONCHITIS: Status: ACTIVE | Noted: 2021-12-23

## 2021-12-23 PROBLEM — J44.1 COPD WITH ACUTE EXACERBATION: Status: RESOLVED | Noted: 2021-11-17 | Resolved: 2021-12-23

## 2021-12-23 PROCEDURE — 99214 OFFICE O/P EST MOD 30 MIN: CPT | Performed by: INTERNAL MEDICINE

## 2021-12-23 NOTE — PROGRESS NOTES
Subjective   Gabi Dudley is a 76 y.o. female.     Chief Complaint   Patient presents with   • Hospital Follow Up Visit   • Diarrhea     symptoms resolved   • Nausea       History of Present Illness   Patient here for follow-up of.  T5 compression fracture improved with kyphoplasty.  Diarrhea recently seen by emergency doctor resolved.  No more nausea.  Hypertension stable on medication.  Atrial fibrillation stable on medication.  Osteoporosis patient is seeing rheumatologist.  Recent CT scan showed suspicious nodule in the right lower lung.  Also adrenal adenoma on CT scan.  COPD stable patient is using oxygen.    Current Outpatient Medications:   •  albuterol sulfate  (90 Base) MCG/ACT inhaler, INHALE 2 PUFFS BY MOUTH EVERY 4 HOURS AS NEEDED FOR WHEEZING, Disp: 18 g, Rfl: 5  •  apixaban (Eliquis) 5 MG tablet tablet, Take 1 tablet by mouth Every 12 (Twelve) Hours., Disp: 60 tablet, Rfl: 4  •  atorvastatin (LIPITOR) 20 MG tablet, TAKE 1 TABLET BY MOUTH EVERY DAY, Disp: 90 tablet, Rfl: 3  •  Azelastine HCl 137 MCG/SPRAY solution, 1 spray into the nostril(s) as directed by provider 2 (Two) Times a Day., Disp: 30 mL, Rfl: 5  •  Cholecalciferol (vitamin D3) 125 MCG (5000 UT) capsule capsule, Take 5,000 Units by mouth Daily., Disp: , Rfl:   •  clonazePAM (KlonoPIN) 1 MG tablet, Take 1 tablet by mouth Daily As Needed for Anxiety., Disp: 30 tablet, Rfl: 0  •  clotrimazole-betamethasone (Lotrisone) 1-0.05 % cream, Apply  topically to the appropriate area as directed 2 (Two) Times a Day., Disp: 45 g, Rfl: 1  •  DULoxetine (CYMBALTA) 60 MG capsule, TAKE ONE CAPSULE BY MOUTH TWICE DAILY, Disp: 180 capsule, Rfl: 3  •  HYDROcodone-acetaminophen (NORCO)  MG per tablet, Take 1 tablet by mouth 4 (Four) Times a Day As Needed., Disp: , Rfl:   •  ipratropium-albuterol (DUO-NEB) 0.5-2.5 mg/3 ml nebulizer, USE 3 ML VIA NEBULIZER EVERY 4 HOURS AS NEEDED FOR WHEEZING, Disp: 360 mL, Rfl: 11  •  loperamide (IMODIUM) 2 MG  capsule, Take 1 capsule by mouth 4 (Four) Times a Day As Needed for Diarrhea., Disp: 20 capsule, Rfl: 0  •  methocarbamol (ROBAXIN) 750 MG tablet, Take 1 tablet by mouth 2 (Two) Times a Day As Needed for Muscle Spasms., Disp: 30 tablet, Rfl: 0  •  O2 (OXYGEN), Inhale 2 L/min As Needed., Disp: , Rfl:   •  oxybutynin XL (DITROPAN-XL) 10 MG 24 hr tablet, TAKE 1 TABLET BY MOUTH DAILY, Disp: 90 tablet, Rfl: 1  •  oxyCODONE-acetaminophen (PERCOCET) 5-325 MG per tablet, TAKE 1/2 TO 1 TABLET BY MOUTH ONCE DAILY AS NEEDED FOR PAIN, Disp: , Rfl:   •  pantoprazole (PROTONIX) 40 MG EC tablet, TAKE 1 TABLET BY MOUTH DAILY, Disp: 90 tablet, Rfl: 3  •  promethazine (PHENERGAN) 25 MG tablet, Take 0.5 tablets by mouth Every 6 (Six) Hours As Needed for Nausea or Vomiting., Disp: 30 tablet, Rfl: 0  •  sertraline (ZOLOFT) 100 MG tablet, TAKE 1 AND 1/2 TABLETS BY MOUTH DAILY, Disp: 180 tablet, Rfl: 3  •  traZODone (DESYREL) 100 MG tablet, TAKE 1 TABLET BY MOUTH EVERY EVENING, Disp: 90 tablet, Rfl: 3    The following portions of the patient's history were reviewed and updated as appropriate: allergies, current medications, past family history, past medical history, past social history, past surgical history and problem list.    Review of Systems   Constitutional: Negative.    Respiratory: Negative.    Cardiovascular: Negative.    Gastrointestinal: Negative.    Musculoskeletal: Positive for back pain.   Skin: Negative.    Neurological: Negative.    Psychiatric/Behavioral: Negative.        Objective   Physical Exam  Cardiovascular:      Rate and Rhythm: Normal rate and regular rhythm.      Heart sounds: Normal heart sounds.   Pulmonary:      Effort: Pulmonary effort is normal.      Breath sounds: Normal breath sounds.   Abdominal:      General: Bowel sounds are normal.   Musculoskeletal:         General: Tenderness present.      Cervical back: Neck supple.   Skin:     General: Skin is warm.   Neurological:      Mental Status: She is alert  and oriented to person, place, and time.         All tests have been reviewed.    Assessment/Plan   Diagnoses and all orders for this visit:    Primary hypertension continue medication    Paroxysmal atrial fibrillation (HCC) continue medication    Compression fracture of T5 vertebra with delayed healing, subsequent encounter follow-up with neurosurgery    Osteoporosis, unspecified osteoporosis type, unspecified pathological fracture presence follow-up with rheumatology    Adrenal adenoma, unspecified laterality refer to urology  -     Ambulatory Referral to Urology    Other emphysema (HCC) follow-up with pulmonology and to continue medication for now    Functional diarrhea resolved    Lung nodule  -     NM PET/CT Whole Body              6 mo and do lab now   ----historical data below  S/p cholecystectomy   tob, quit already. CT lung 8/2020, lung nodule, scarring and emphysema.   anxiety continue zoloft 150 mg daily.   and klonopin 1mg, 1/2 tab po prn , again emphasized as needed basis.  Continue cymbalta 60 bid.  Patient refused to take BuSpar or see psychiatrist.  CT scan showed liver cyst, large kidney cyst, adrenal gland adenoma, kidney stones non-obstructing. repeat showed no change 7/2020,benign per radiologist  right knee OA on xray aleve prn knee brace help  hip pain s/p steroid helped improved  Osteoporosis continue medication, refuse dexa   heme+, Colon polyps repeat 6/2019. EGD done  COPD continue medication. need to Quit tobacco. tudorsa, proair, continue O2 prn, symbicort refill nebulizer  Fibromyalgia stable watch continue medicine and follow up with pain clinic,with lortab  Tension HA continue flexeril and naproxen  sinus polyp per dentist, obtain rec.  ref to ENT.patient refuses, azelastine and Claritin D 5 mg in the morning  Nipple retraction mamm and US:negative pathology  Cysts in liver and kidney, watch  Kidney lesion renal image CT renal protocol cyst and stone non obstructing  A.fib and  pericardia eff, follow cardio patient to schedule  Liver cyst on CT watch  Right neck SK watch for now, patient to call if increase in size or change in color  Colon 7/24/19, repeat in 3 years  CT scan showed diverticulosis

## 2021-12-28 DIAGNOSIS — R32 URINARY INCONTINENCE, UNSPECIFIED TYPE: ICD-10-CM

## 2021-12-28 RX ORDER — OXYBUTYNIN CHLORIDE 10 MG/1
10 TABLET, EXTENDED RELEASE ORAL DAILY
Qty: 90 TABLET | Refills: 3 | Status: SHIPPED | OUTPATIENT
Start: 2021-12-28 | End: 2022-04-20 | Stop reason: SDUPTHER

## 2022-01-11 PROCEDURE — U0004 COV-19 TEST NON-CDC HGH THRU: HCPCS | Performed by: PHYSICIAN ASSISTANT

## 2022-01-11 PROCEDURE — U0005 INFEC AGEN DETEC AMPLI PROBE: HCPCS | Performed by: PHYSICIAN ASSISTANT

## 2022-01-14 DIAGNOSIS — F41.9 ANXIETY: ICD-10-CM

## 2022-01-14 RX ORDER — CLONAZEPAM 1 MG/1
1 TABLET ORAL DAILY PRN
Qty: 30 TABLET | Refills: 0 | Status: SHIPPED | OUTPATIENT
Start: 2022-01-14 | End: 2022-02-13

## 2022-01-14 NOTE — TELEPHONE ENCOUNTER
Rx Refill Note  Requested Prescriptions     Pending Prescriptions Disp Refills   • clonazePAM (KlonoPIN) 1 MG tablet [Pharmacy Med Name: CLONAZEPAM 1MG TABLETS] 30 tablet 0     Sig: TAKE 1 TABLET BY MOUTH DAILY AS NEEDED FOR ANXIETY      Last office visit with prescribing clinician: 12/23/2021      Next office visit with prescribing clinician: 3/23/2022            Jackie Leone MA  01/14/22, 12:30 EST

## 2022-02-11 ENCOUNTER — TELEPHONE (OUTPATIENT)
Dept: INTERNAL MEDICINE | Facility: CLINIC | Age: 77
End: 2022-02-11

## 2022-02-11 DIAGNOSIS — F41.9 ANXIETY: ICD-10-CM

## 2022-02-11 NOTE — TELEPHONE ENCOUNTER
Caller: Malu Aguilar    Relationship: Emergency Contact    Best call back number: [phone]    Who are you requesting to speak with (clinical staff, provider,  specific staff member): CLINICAL STAFF    What was the call regarding: ROSSY REQUESTED A CALL BACK TO DISCUSS PATIENT'S UPCOMING APPOINTMENT WITH GERMAN [facility]AL ON 2/17/22,THE PATIENT'S OXYGEN USE, AND OXYGEN SUPPLIES.    Do you require a callback: YES

## 2022-02-11 NOTE — TELEPHONE ENCOUNTER
.Rx Refill Note  Requested Prescriptions     Pending Prescriptions Disp Refills    clonazePAM (KlonoPIN) 1 MG tablet [Pharmacy Med Name: CLONAZEPAM 1MG TABLETS] 30 tablet      Sig: TAKE 1 TABLET BY MOUTH DAILY AS NEEDED FOR ANXIETY      Last office visit with prescribing clinician: 12/23/2021      Next office visit with prescribing clinician: 3/23/2022            Doyle Laura MA  02/11/22, 15:22 EST   
done

## 2022-02-13 RX ORDER — CLONAZEPAM 1 MG/1
1 TABLET ORAL DAILY PRN
Qty: 30 TABLET | Refills: 0 | Status: SHIPPED | OUTPATIENT
Start: 2022-02-13 | End: 2022-03-03 | Stop reason: SDUPTHER

## 2022-02-14 ENCOUNTER — OFFICE VISIT (OUTPATIENT)
Dept: CARDIOLOGY | Facility: CLINIC | Age: 77
End: 2022-02-14

## 2022-02-14 VITALS
BODY MASS INDEX: 26.33 KG/M2 | HEIGHT: 65 IN | SYSTOLIC BLOOD PRESSURE: 100 MMHG | OXYGEN SATURATION: 96 % | HEART RATE: 100 BPM | RESPIRATION RATE: 18 BRPM | WEIGHT: 158 LBS | DIASTOLIC BLOOD PRESSURE: 68 MMHG

## 2022-02-14 DIAGNOSIS — E78.00 PURE HYPERCHOLESTEROLEMIA: ICD-10-CM

## 2022-02-14 DIAGNOSIS — I25.10 CORONARY ARTERY DISEASE INVOLVING NATIVE CORONARY ARTERY OF NATIVE HEART WITHOUT ANGINA PECTORIS: ICD-10-CM

## 2022-02-14 DIAGNOSIS — I48.0 PAROXYSMAL ATRIAL FIBRILLATION: Primary | ICD-10-CM

## 2022-02-14 DIAGNOSIS — I10 PRIMARY HYPERTENSION: ICD-10-CM

## 2022-02-14 PROCEDURE — 93000 ELECTROCARDIOGRAM COMPLETE: CPT | Performed by: INTERNAL MEDICINE

## 2022-02-14 PROCEDURE — 99214 OFFICE O/P EST MOD 30 MIN: CPT | Performed by: INTERNAL MEDICINE

## 2022-02-14 NOTE — PROGRESS NOTES
"             [facility] Cardiology Office Follow Up Note    Gabi Dudley  [id number]  2022    Primary Care Provider: Paul Quinteros MD    Chief Complaint: Routine follow-up    History of Present Illness:   Mrs. Gabi Dudley is a 76 y.o. female who presents to the Cardiology Clinic for  regular follow-up.  The patient has a past medical history significant for hypertension, dyslipidemia, prior pulmonary embolism, fibromyalgia, anxiety, and chronic tobacco use complicated by COPD.  Her past cardiac history is significant for atrial fibrillation and nonobstructive coronary artery disease diagnosed on remote coronary angiogram in approximately .   She presents the cardiology clinic today for routine follow-up.  Since her last appointment, the patient denies any significant changes in her health.  She reports occasional episodes of a \"sharp\" chest pain located in her anterior chest.  The episodes last 1 to 2 seconds before resolving spontaneously.  She denies any association with exertion.  She does report rare episodes of palpitations, which may last several minutes before resolving spontaneously.  No associated dizziness, lightheadedness, or presyncopal symptoms.  No history of syncope.  She continues to tolerate Eliquis without significant bleeding or bruising.  No other specific complaints today.    Past Cardiac Testin. Last Coronary Angio:  , reportedly nonobstructive disease.  Report unavailable  2. Prior Stress Testing: Remote, with report unavailable  3. Last Echo: 2020              1.  Normal left ventricular size with low normal LV systolic function, LVEF 50-55%.              2.  Mild concentric LVH.              3.  Normal LV diastolic filling pattern.              4.  Mild right ventricular dilation with normal RV systolic function.              5.  Normal left and right atrial size.              6.  Trace MR and TR.  4. Prior Holter Monitor: 48-hour Holter " "6/16/2020              1.  The predominant rhythm is sinus rhythm with an average heart rate 90 bpm.              2.  Normal atrioventricular conduction.              3.  No sustained supraventricular or ventricular arrhythmias.              4.  No episodes of paroxysmal atrial fibrillation.              5.  Rare supraventricular ectopy with isolated PACs, burden <0.1%.              6.  Rare ventricular ectopy with isolated and pairs of PVCs, burden <0.1%.              7.  One symptomatic episode of \"anxiety, chest pain, shortness of air\" corresponded to NSR at 91 bpm.    Review of Systems:   Review of Systems   Constitutional: Negative for activity change, appetite change, chills, diaphoresis, fatigue, fever, unexpected weight gain and unexpected weight loss.   Eyes: Negative for blurred vision and double vision.   Respiratory: Positive for shortness of breath. Negative for cough, chest tightness and wheezing.    Cardiovascular: Positive for chest pain and palpitations. Negative for leg swelling.   Gastrointestinal: Negative for abdominal pain, anal bleeding, blood in stool and GERD.   Endocrine: Negative for cold intolerance and heat intolerance.   Genitourinary: Negative for hematuria.   Neurological: Negative for dizziness, syncope, weakness and light-headedness.   Hematological: Does not bruise/bleed easily.   Psychiatric/Behavioral: Negative for depressed mood and stress. The patient is not nervous/anxious.        I have reviewed and/or updated the patient's past medical, past surgical, family, social history, problem list and allergies as appropriate.     Medications:     Current Outpatient Medications:   •  albuterol sulfate  (90 Base) MCG/ACT inhaler, INHALE 2 PUFFS BY MOUTH EVERY 4 HOURS AS NEEDED FOR WHEEZING, Disp: 18 g, Rfl: 5  •  apixaban (Eliquis) 5 MG tablet tablet, Take 1 tablet by mouth Every 12 (Twelve) Hours., Disp: 60 tablet, Rfl: 4  •  atorvastatin (LIPITOR) 20 MG tablet, TAKE 1 TABLET " BY MOUTH EVERY DAY, Disp: 90 tablet, Rfl: 3  •  Azelastine HCl 137 MCG/SPRAY solution, 1 spray into the nostril(s) as directed by provider 2 (Two) Times a Day., Disp: 30 mL, Rfl: 5  •  clonazePAM (KlonoPIN) 1 MG tablet, TAKE 1 TABLET BY MOUTH DAILY AS NEEDED FOR ANXIETY, Disp: 30 tablet, Rfl: 0  •  clotrimazole-betamethasone (Lotrisone) 1-0.05 % cream, Apply  topically to the appropriate area as directed 2 (Two) Times a Day., Disp: 45 g, Rfl: 1  •  DULoxetine (CYMBALTA) 60 MG capsule, TAKE ONE CAPSULE BY MOUTH TWICE DAILY, Disp: 180 capsule, Rfl: 3  •  HYDROcodone-acetaminophen (NORCO)  MG per tablet, Take 1 tablet by mouth 4 (Four) Times a Day As Needed., Disp: , Rfl:   •  ipratropium-albuterol (DUO-NEB) 0.5-2.5 mg/3 ml nebulizer, USE 3 ML VIA NEBULIZER EVERY 4 HOURS AS NEEDED FOR WHEEZING, Disp: 360 mL, Rfl: 11  •  loperamide (IMODIUM) 2 MG capsule, Take 1 capsule by mouth 4 (Four) Times a Day As Needed for Diarrhea., Disp: 20 capsule, Rfl: 0  •  O2 (OXYGEN), Inhale 2 L/min As Needed., Disp: , Rfl:   •  ondansetron (ZOFRAN) 4 MG tablet, Take 1 tablet by mouth Every 8 (Eight) Hours As Needed for Nausea or Vomiting., Disp: 9 tablet, Rfl: 0  •  oxybutynin XL (DITROPAN-XL) 10 MG 24 hr tablet, TAKE 1 TABLET BY MOUTH DAILY, Disp: 90 tablet, Rfl: 3  •  pantoprazole (PROTONIX) 40 MG EC tablet, TAKE 1 TABLET BY MOUTH DAILY, Disp: 90 tablet, Rfl: 3  •  sertraline (ZOLOFT) 100 MG tablet, TAKE 1 AND 1/2 TABLETS BY MOUTH DAILY, Disp: 180 tablet, Rfl: 3  •  traZODone (DESYREL) 100 MG tablet, TAKE 1 TABLET BY MOUTH EVERY EVENING, Disp: 90 tablet, Rfl: 3  •  azithromycin (Zithromax Z-Demian) 250 MG tablet, Take 2 tablets the first day, then 1 tablet daily for 4 days., Disp: 6 tablet, Rfl: 0  •  Cholecalciferol (vitamin D3) 125 MCG (5000 UT) capsule capsule, Take 5,000 Units by mouth Daily., Disp: , Rfl:   •  methylPREDNISolone (MEDROL) 4 MG dose pack, Take as directed on package instructions., Disp: 1 each, Rfl: 0    Physical  "Exam:  Vital Signs:   Vitals:    02/14/22 1614   BP: 100/68   BP Location: Right arm   Patient Position: Sitting   Pulse: 100   Resp: 18   SpO2: 96%  Comment: ON 2 LPM   Weight: 71.7 kg (158 lb)   Height: 165.1 cm (65\")       Physical Exam  Constitutional:       General: She is not in acute distress.     Appearance: Normal appearance. She is well-developed. She is not diaphoretic.   HENT:      Head: Normocephalic and atraumatic.   Eyes:      General: No scleral icterus.     Pupils: Pupils are equal, round, and reactive to light.   Neck:      Trachea: No tracheal deviation.   Cardiovascular:      Rate and Rhythm: Normal rate and regular rhythm.      Heart sounds: Normal heart sounds. No murmur heard.  No friction rub. No gallop.       Comments: Normal JVD.  Pulmonary:      Effort: Pulmonary effort is normal. No respiratory distress.      Breath sounds: Normal breath sounds. No stridor. No wheezing or rales.      Comments: Supplemental O2 via nasal cannula  Chest:      Chest wall: No tenderness.   Abdominal:      General: Bowel sounds are normal. There is no distension.      Palpations: Abdomen is soft.      Tenderness: There is no abdominal tenderness. There is no guarding or rebound.   Musculoskeletal:         General: No swelling. Normal range of motion.      Cervical back: Neck supple.   Lymphadenopathy:      Cervical: No cervical adenopathy.   Skin:     General: Skin is warm and dry.      Findings: No erythema.   Neurological:      General: No focal deficit present.      Mental Status: She is alert and oriented to person, place, and time.   Psychiatric:         Mood and Affect: Mood normal.         Behavior: Behavior normal.         Results Review:   I reviewed the patient's new clinical results.  I personally viewed and interpreted the patient's EKG/Telemetry data      ECG 12 Lead    Date/Time: 2/14/2022 9:32 PM  Performed by: Taiwo Curry MD  Authorized by: Taiwo Curry MD   Comparison: not compared " with previous ECG   Rhythm: sinus rhythm  Rate: normal  QRS axis: normal    Clinical impression: normal ECG            Assessment / Plan:     1.  Paroxysmal atrial fibrillation   --Rare episodes of palpitations of unclear etiology, potentially related to recurrent PAF  --ECG today shows NSR  --Outpatient cardiac monitoring 2020 showed no recurrent episodes of PAF, reported symptoms corresponded to NSR  --CHADS2-VASc score of 4, continue Eliquis for CVA prophylaxis  --Given episodes of palpitations are rare minimally symptomatic, will defer repeat outpatient cardiac monitoring for now, reconsider if increased frequency or duration of palpitations     2.  Coronary artery disease   --History of nonobstructive coronary artery disease based on remote coronary angiography  --Continues to have episodes of noncardiac chest pain, most likely musculoskeletal in etiology  --Given chest pain appears to be noncardiac, will defer ischemic evaluation for now  --Continue atorvastatin      3.  Essential hypertension  --Normotensive today     4.  Autonomic dysfunction  --History of autonomic dysfunction, currently without autonomic symptoms     5.  Chronic tobacco use  --Prior cessation     6.  COPD  --Previously for referred to pulmonology, however the patient did not make her follow-up appointment  --Currently declining repeat referral to pulmonology    Follow Up:   Return in about 6 months (around 8/14/2022).      Thank you for allowing me to participate in the care of your patient. Please to not hesitate to contact me with additional questions or concerns.     VIDAL Curry MD  Interventional Cardiology   02/14/2022  16:26 EST

## 2022-02-14 NOTE — TELEPHONE ENCOUNTER
Caller: Malu Aguilar    Relationship to patient: Emergency Contact    Best call back number: [phone]    Patient is needing: ROSSY WOULD LIKE A CALL FROM DR PRADHAN ABOUT THE OXYGEN AND A REFERRAL FOR RHEUMATOLOGY.

## 2022-02-17 ENCOUNTER — OFFICE VISIT (OUTPATIENT)
Dept: INTERNAL MEDICINE | Facility: CLINIC | Age: 77
End: 2022-02-17

## 2022-02-17 VITALS
TEMPERATURE: 97.3 F | DIASTOLIC BLOOD PRESSURE: 70 MMHG | HEART RATE: 101 BPM | WEIGHT: 160.4 LBS | HEIGHT: 65 IN | OXYGEN SATURATION: 93 % | SYSTOLIC BLOOD PRESSURE: 128 MMHG | BODY MASS INDEX: 26.73 KG/M2

## 2022-02-17 DIAGNOSIS — F51.04 PSYCHOPHYSIOLOGICAL INSOMNIA: ICD-10-CM

## 2022-02-17 DIAGNOSIS — R09.02 HYPOXIA: ICD-10-CM

## 2022-02-17 DIAGNOSIS — J43.9 PULMONARY EMPHYSEMA, UNSPECIFIED EMPHYSEMA TYPE: ICD-10-CM

## 2022-02-17 DIAGNOSIS — R91.8 LUNG MASS: Primary | ICD-10-CM

## 2022-02-17 DIAGNOSIS — Z99.81 ON HOME OXYGEN THERAPY: ICD-10-CM

## 2022-02-17 PROCEDURE — 99214 OFFICE O/P EST MOD 30 MIN: CPT | Performed by: NURSE PRACTITIONER

## 2022-02-17 RX ORDER — TRAZODONE HYDROCHLORIDE 150 MG/1
150 TABLET ORAL EVERY EVENING
Qty: 90 TABLET | Refills: 0 | Status: SHIPPED | OUTPATIENT
Start: 2022-02-17 | End: 2022-06-17 | Stop reason: SDUPTHER

## 2022-02-17 NOTE — PROGRESS NOTES
Office Visit      Patient Name: Gabi Dudley  : 1945   MRN: [id number]   Care Team: Patient Care Team:  Paul Quinteros MD as PCP - General (Internal Medicine)  Sima Moses MD as Consulting Physician (General Surgery)  Taiwo Curry MD as Consulting Physician (Cardiology)    Chief Complaint  Hypertension (follow up and oxygen supplies ), Emphysema, and Lung Nodule    Subjective     Subjective      Gabi Dudley presents to [facility] for home oxygen supplies, insomnia concerns, and lung nodule.  Daughter is accompanying her today helping with the history  Needing a new prescription for home oxygen.  She is now wearing home oxygen continuously at 2 L via nasal cannula, this does help maintain her oxygen saturation greater than 90%.  Recently diagnosed with a lung nodule and PET scan was ordered, she has not been able to perform yet needing to get scheduled.  She continues to feel short of breath especially on exertion, feels like she is at her baseline today.  She is using her prescribed inhalers which include as needed albuterol and duo nebs.  She denies any acute needs from a pulmonary standpoint today.  A 6-minute walk test was attempted unable to be performed due to hypoxia at rest.  Upon removal of oxygen her saturation dropped to 87% within 1 minute.  Procedure was stopped and oxygen was placed back on at 2 L nasal cannula, easily came back up to 93% oxygen saturation.    She is acutely complaining of insomnia today.  Is on trazodone 100 mg, used to work but now having to use half of a Klonopin (0.5 mg) in addition to her trazodone to get good rest.  Therefore she does not have enough Klonopin during the daytime when she is anxious.  Daughter would like to try to go up on the trazodone and inquiring about this today.    Review of Systems   Constitutional: Negative for appetite change, fatigue and fever.   HENT: Negative for congestion, sore throat and  "trouble swallowing.    Eyes: Negative for blurred vision and visual disturbance.   Respiratory: Positive for shortness of breath (baseline). Negative for cough and wheezing.    Cardiovascular: Negative for chest pain, palpitations and leg swelling.   Gastrointestinal: Negative for abdominal pain, blood in stool, constipation, diarrhea and nausea.   Endocrine: Negative for polydipsia, polyphagia and polyuria.   Genitourinary: Negative for dysuria.   Musculoskeletal: Negative for arthralgias, back pain and myalgias.   Skin: Negative for rash.   Neurological: Negative for dizziness, weakness, light-headedness and headache.   Psychiatric/Behavioral: Negative for sleep disturbance and depressed mood. The patient is not nervous/anxious.        Objective     Objective   Vital Signs:   /70   Pulse 101   Temp 97.3 °F (36.3 °C) (Temporal)   Ht 165.1 cm (65\")   Wt 72.8 kg (160 lb 6.4 oz)   SpO2 93% Comment: currently on 2L of O2  BMI 26.69 kg/m²     Physical Exam  Vitals and nursing note reviewed.   Constitutional:       General: She is not in acute distress.     Appearance: Normal appearance. She is ill-appearing (chronically ill appearing). She is not toxic-appearing.   Eyes:      Pupils: Pupils are equal, round, and reactive to light.   Cardiovascular:      Rate and Rhythm: Normal rate and regular rhythm.      Heart sounds: Normal heart sounds. No murmur heard.      Pulmonary:      Effort: Pulmonary effort is normal. No respiratory distress.      Breath sounds: Decreased air movement present. Wheezing (faint) present.      Comments: On 2L/NC  Abdominal:      General: Bowel sounds are normal. There is no distension.      Palpations: Abdomen is soft.      Tenderness: There is no abdominal tenderness.   Musculoskeletal:      Cervical back: Neck supple. No tenderness.      Comments: Using wheelchair due to weakness/shortness of breath     Skin:     General: Skin is warm and dry.      Findings: No rash. "   Neurological:      General: No focal deficit present.      Mental Status: She is alert.   Psychiatric:         Mood and Affect: Mood normal.         Behavior: Behavior normal.          Assessment / Plan      Assessment/Plan   Problem List Items Addressed This Visit        Pulmonary and Pneumonias    Pulmonary emphysema (HCC)    Relevant Orders    Oxygen Therapy    Continue albuterol as needed, would benefit from maintenance inhaler will discuss with PCP. Home oxygen order updated. Recommend compliance with oxygen, try to stay as active as possible, and given number for scheduling to schedule PET scan.        Sleep    Insomnia    Relevant Medications    traZODone (DESYREL) 150 MG tablet    Increase trazodone, stop klonopin at bedtime. Discussed sleep hygiene, limit caffeine, and take medication as prescribed. Keep follow-up with PCP in 3 weeks.       Other Visit Diagnoses     Lung mass    -  Primary    Relevant Orders    Oxygen Therapy    Encouraged to have PET scan performed, given number for scheduling. Home oxygen order updated.     On home oxygen therapy        Relevant Orders    Oxygen Therapy    Hypoxia        Relevant Orders    Oxygen Therapy           Follow Up   Return for Keep follow-up with PCP.  Patient was given instructions and counseling regarding her condition or for health maintenance advice. Please see specific information pulled into the AVS if appropriate.     REGINA Fortune  [facility]

## 2022-02-18 DIAGNOSIS — R91.1 LUNG NODULE: Primary | ICD-10-CM

## 2022-03-03 DIAGNOSIS — F41.9 ANXIETY: ICD-10-CM

## 2022-03-03 RX ORDER — CLONAZEPAM 1 MG/1
1 TABLET ORAL DAILY PRN
Qty: 30 TABLET | Refills: 0 | Status: SHIPPED | OUTPATIENT
Start: 2022-03-03 | End: 2022-05-09 | Stop reason: SDUPTHER

## 2022-03-03 NOTE — TELEPHONE ENCOUNTER
Caller: Gabi Dudley    Relationship: Self    Best call back number: [phone]     Requested Prescriptions:   Requested Prescriptions     Pending Prescriptions Disp Refills   • clonazePAM (KlonoPIN) 1 MG tablet 30 tablet 0     Sig: Take 1 tablet by mouth Daily As Needed for Anxiety.        Pharmacy where request should be sent: [id number] [address] [place] [address] [employer] [place] AT [facility] [employer] [facility] - [phone]  - [phone] FX     Additional details provided by patient: N/A    Does the patient have less than a 3 day supply:  [] Yes  [x] No    Rafa Alfaro Rep   03/03/22 13:57 EST

## 2022-03-03 NOTE — TELEPHONE ENCOUNTER
Rx Refill Note  Requested Prescriptions     Pending Prescriptions Disp Refills   • clonazePAM (KlonoPIN) 1 MG tablet 30 tablet 0     Sig: Take 1 tablet by mouth Daily As Needed for Anxiety.      Last office visit with prescribing clinician: 12/23/2021      Next office visit with prescribing clinician: 4/20/2022            JARON GRIFFIN MA  03/03/22, 14:03 EST     CSA: N/A  UDS: N/A

## 2022-03-07 ENCOUNTER — TELEPHONE (OUTPATIENT)
Dept: INTERNAL MEDICINE | Facility: CLINIC | Age: 77
End: 2022-03-07

## 2022-03-07 NOTE — TELEPHONE ENCOUNTER
Kenna with [facility] called and left vm on referral line. She would like to speak with nurse regarding pet scan order. Call her at [phone].

## 2022-03-08 DIAGNOSIS — R91.1 LUNG NODULE: Primary | ICD-10-CM

## 2022-03-08 NOTE — TELEPHONE ENCOUNTER
Contacted Kenna and she stated that a whole body PET scan is not appropriate of a lung nodule and she is asking that order be changed to code [id number]; skull to mid back    Please advise and order if appropriate. They are requesting this order by 2 PM today

## 2022-03-08 NOTE — TELEPHONE ENCOUNTER
Kenna has called back and needs to speak with nurse regarding order prior to 2pm today. They have to have the order finalized by 2pm.

## 2022-03-09 ENCOUNTER — HOSPITAL ENCOUNTER (OUTPATIENT)
Dept: PET IMAGING | Facility: HOSPITAL | Age: 77
Discharge: HOME | End: 2022-03-09

## 2022-03-09 ENCOUNTER — APPOINTMENT (OUTPATIENT)
Dept: PET IMAGING | Facility: HOSPITAL | Age: 77
End: 2022-03-09

## 2022-03-09 ENCOUNTER — HOSPITAL ENCOUNTER (OUTPATIENT)
Dept: PET IMAGING | Facility: HOSPITAL | Age: 77
End: 2022-03-09

## 2022-03-09 DIAGNOSIS — R91.1 LUNG NODULE: ICD-10-CM

## 2022-03-09 LAB — GLUCOSE BLDC GLUCOMTR-MCNC: 97 MG/DL (ref 70–130)

## 2022-03-09 PROCEDURE — A9552 F18 FDG: HCPCS | Performed by: INTERNAL MEDICINE

## 2022-03-09 PROCEDURE — 0 FLUDEOXYGLUCOSE F18 SOLUTION: Performed by: INTERNAL MEDICINE

## 2022-03-09 PROCEDURE — 78815 PET IMAGE W/CT SKULL-THIGH: CPT

## 2022-03-09 PROCEDURE — 82962 GLUCOSE BLOOD TEST: CPT

## 2022-03-09 RX ADMIN — FLUDEOXYGLUCOSE F18 1 DOSE: 300 INJECTION INTRAVENOUS at 14:45

## 2022-03-15 ENCOUNTER — HOSPITAL ENCOUNTER (OUTPATIENT)
Dept: INFUSION THERAPY | Facility: HOSPITAL | Age: 77
Setting detail: INFUSION SERIES
Discharge: HOME | End: 2022-03-15

## 2022-03-15 VITALS
WEIGHT: 160 LBS | DIASTOLIC BLOOD PRESSURE: 71 MMHG | OXYGEN SATURATION: 96 % | BODY MASS INDEX: 26.66 KG/M2 | TEMPERATURE: 97.7 F | RESPIRATION RATE: 18 BRPM | SYSTOLIC BLOOD PRESSURE: 104 MMHG | HEIGHT: 65 IN | HEART RATE: 98 BPM

## 2022-03-15 DIAGNOSIS — M81.0 AGE-RELATED OSTEOPOROSIS WITHOUT CURRENT PATHOLOGICAL FRACTURE: Primary | ICD-10-CM

## 2022-03-15 PROCEDURE — 96401 CHEMO ANTI-NEOPL SQ/IM: CPT

## 2022-03-15 PROCEDURE — 25010000002 DENOSUMAB 60 MG/ML SOLUTION PREFILLED SYRINGE: Performed by: INTERNAL MEDICINE

## 2022-03-15 PROCEDURE — 96372 THER/PROPH/DIAG INJ SC/IM: CPT

## 2022-03-15 RX ADMIN — DENOSUMAB 60 MG: 60 INJECTION SUBCUTANEOUS at 15:24

## 2022-03-17 ENCOUNTER — OFFICE VISIT (OUTPATIENT)
Dept: PULMONOLOGY | Facility: CLINIC | Age: 77
End: 2022-03-17

## 2022-03-17 ENCOUNTER — LAB (OUTPATIENT)
Dept: LAB | Facility: HOSPITAL | Age: 77
End: 2022-03-17

## 2022-03-17 ENCOUNTER — HOSPITAL ENCOUNTER (OUTPATIENT)
Dept: CARDIOLOGY | Facility: HOSPITAL | Age: 77
Discharge: HOME | End: 2022-03-17

## 2022-03-17 VITALS
OXYGEN SATURATION: 88 % | RESPIRATION RATE: 18 BRPM | HEART RATE: 104 BPM | DIASTOLIC BLOOD PRESSURE: 68 MMHG | WEIGHT: 159 LBS | SYSTOLIC BLOOD PRESSURE: 118 MMHG | HEIGHT: 65 IN | BODY MASS INDEX: 26.49 KG/M2

## 2022-03-17 DIAGNOSIS — J43.9 PULMONARY EMPHYSEMA, UNSPECIFIED EMPHYSEMA TYPE: ICD-10-CM

## 2022-03-17 DIAGNOSIS — R09.02 HYPOXIA: ICD-10-CM

## 2022-03-17 DIAGNOSIS — J44.9 CHRONIC OBSTRUCTIVE PULMONARY DISEASE, UNSPECIFIED COPD TYPE: ICD-10-CM

## 2022-03-17 DIAGNOSIS — R06.02 SHORTNESS OF BREATH: Primary | ICD-10-CM

## 2022-03-17 DIAGNOSIS — Z87.891 PERSONAL HISTORY OF TOBACCO USE, PRESENTING HAZARDS TO HEALTH: ICD-10-CM

## 2022-03-17 DIAGNOSIS — R06.02 SHORTNESS OF BREATH: ICD-10-CM

## 2022-03-17 LAB
A-A DO2: 73.4 MMHG
ARTERIAL PATENCY WRIST A: POSITIVE
ATMOSPHERIC PRESS: 732 MMHG
BASE EXCESS BLDA CALC-SCNC: 9.5 MMOL/L (ref 0–2)
BDY SITE: ABNORMAL
COHGB MFR BLD: 0.8 % (ref 0–2)
GAS FLOW AIRWAY: 2 LPM
HCO3 BLDA-SCNC: 36.4 MMOL/L (ref 22–28)
HCT VFR BLD CALC: 36.4 %
INHALED O2 CONCENTRATION: 28 %
Lab: ABNORMAL
METHGB BLD QL: 0.5 % (ref 0–1.5)
MODALITY: ABNORMAL
NOTE: ABNORMAL
OXYHGB MFR BLDV: 86.9 % (ref 94–99)
PCO2 BLDA: 59.8 MM HG (ref 35–45)
PCO2 TEMP ADJ BLD: ABNORMAL MM[HG]
PH BLDA: 7.39 PH UNITS (ref 7.35–7.45)
PH, TEMP CORRECTED: ABNORMAL
PO2 BLDA: 51.7 MM HG (ref 75–100)
PO2 TEMP ADJ BLD: ABNORMAL MM[HG]
SAO2 % BLDCOA: 88 % (ref 94–100)
VENTILATOR MODE: ABNORMAL

## 2022-03-17 PROCEDURE — 99204 OFFICE O/P NEW MOD 45 MIN: CPT | Performed by: INTERNAL MEDICINE

## 2022-03-17 PROCEDURE — 36600 WITHDRAWAL OF ARTERIAL BLOOD: CPT | Performed by: INTERNAL MEDICINE

## 2022-03-17 PROCEDURE — 36415 COLL VENOUS BLD VENIPUNCTURE: CPT

## 2022-03-17 PROCEDURE — 83050 HGB METHEMOGLOBIN QUAN: CPT | Performed by: INTERNAL MEDICINE

## 2022-03-17 PROCEDURE — 82103 ALPHA-1-ANTITRYPSIN TOTAL: CPT

## 2022-03-17 PROCEDURE — 82104 ALPHA-1-ANTITRYPSIN PHENO: CPT

## 2022-03-17 PROCEDURE — 82375 ASSAY CARBOXYHB QUANT: CPT | Performed by: INTERNAL MEDICINE

## 2022-03-17 PROCEDURE — 82805 BLOOD GASES W/O2 SATURATION: CPT | Performed by: INTERNAL MEDICINE

## 2022-03-17 RX ORDER — ATORVASTATIN CALCIUM 20 MG/1
1 TABLET, FILM COATED ORAL DAILY
COMMUNITY
Start: 2021-12-01 | End: 2022-04-20 | Stop reason: SDUPTHER

## 2022-03-17 RX ORDER — SULFAMETHOXAZOLE AND TRIMETHOPRIM 800; 160 MG/1; MG/1
TABLET ORAL
COMMUNITY
Start: 2021-11-17 | End: 2022-04-20

## 2022-03-17 RX ORDER — DULOXETIN HYDROCHLORIDE 60 MG/1
1 CAPSULE, DELAYED RELEASE ORAL 2 TIMES DAILY
COMMUNITY
Start: 2021-11-15 | End: 2022-04-20 | Stop reason: SDUPTHER

## 2022-03-17 RX ORDER — TIOTROPIUM BROMIDE AND OLODATEROL 3.124; 2.736 UG/1; UG/1
SPRAY, METERED RESPIRATORY (INHALATION)
COMMUNITY
Start: 2021-10-12 | End: 2022-03-17

## 2022-03-17 RX ORDER — PROMETHAZINE HYDROCHLORIDE 25 MG/1
TABLET ORAL
COMMUNITY
Start: 2021-11-17 | End: 2022-04-20

## 2022-03-17 RX ORDER — SODIUM FLUORIDE 5 MG/G
GEL, DENTIFRICE DENTAL
COMMUNITY
Start: 2021-11-11 | End: 2023-01-26 | Stop reason: HOSPADM

## 2022-03-17 RX ORDER — BUDESONIDE, GLYCOPYRROLATE, AND FORMOTEROL FUMARATE 160; 9; 4.8 UG/1; UG/1; UG/1
2 AEROSOL, METERED RESPIRATORY (INHALATION) 2 TIMES DAILY
Qty: 1 EACH | Refills: 5 | Status: SHIPPED | OUTPATIENT
Start: 2022-03-17 | End: 2022-05-04

## 2022-03-17 NOTE — PROGRESS NOTES
CONSULT NOTE    Requested by:   Taiwo Curry MD Yin, Arthur G, MD      Chief Complaint   Patient presents with   • Consult   • Breathing Problem       Subjective:  Gabi Dudley is a 77 y.o. female.     History of Present Illness   Patient comes in today for evaluation of shortness of breath. Patient says that for the past few years, she has been noticing that her exercise tolerance has been declining. The patient has had days when walking any significant distance is difficult to do without stopping in the middle, to catch her breath.     Patient also complains of some cough and phlegm production that occurs on most mornings out of the week, for most weeks out of the month, for the past few years. Patient used to smoke 1 pack per day for the past 30-35 years and quit smoking in November 2020. She also was a .    she is currently on oxygen.     She had a bronchitis 4-5 months ago.      The following portions of the patient's history were reviewed and updated as appropriate: allergies, current medications, past family history, past medical history, past social history and past surgical history.    Review of Systems   HENT: Positive for sinus pressure and sneezing. Negative for sore throat.    Respiratory: Positive for cough, chest tightness, shortness of breath and wheezing.    Cardiovascular: Positive for palpitations. Negative for leg swelling.   Psychiatric/Behavioral: Negative for sleep disturbance.   All other systems reviewed and are negative.      Past Medical History:   Diagnosis Date   • Adrenal adenoma    • Anemia    • Arrhythmia    • Asthma    • Atrial fibrillation (HCC)    • Back pain    • Benign colonic polyp    • Benign tumor of adrenal gland    • Cataract     bilateral   • Cholelithiasis    • Chronic bronchitis (HCC)    • Chronic bronchitis with COPD (chronic obstructive pulmonary disease) ([facility])    • COPD (chronic obstructive pulmonary disease) ([facility]) 2005   • Coronary artery  "disease    • Depression    • Elevated cholesterol    • Environmental and seasonal allergies    • Fibromyalgia    • Gastritis    • Generalized anxiety disorder    • GERD (gastroesophageal reflux disease)    • H/O mammogram    • Hemorrhoids    • History of blood transfusion    • History of blood transfusion    • History of echocardiogram    • History of endometriosis    • History of nuclear stress test    • Hypertension    • IBS (irritable bowel syndrome)    • Impaired functional mobility, balance, gait, and endurance    • Inverted nipple    • Kidney disease    • Kidney stone    • Liver cyst    • Nodular radiologic density    • Nodule of left lung    • On home oxygen therapy     2 liters NC QHS   • Osteoarthritis    • Osteoporosis    • PONV (postoperative nausea and vomiting)    • Renal cyst    • Sinus problem        • Sinusitis    • Skin cancer     basal cell carcinoma   • SOB (shortness of breath)    • Tobacco use    • Urinary frequency    • Vitamin D deficiency    • Wears glasses    • Wears partial dentures     upper plate       Social History     Tobacco Use   • Smoking status: Former Smoker     Packs/day: 0.25     Years: 30.00     Pack years: 7.50     Types: Cigarettes     Quit date: 2020     Years since quittin.3   • Smokeless tobacco: Never Used   Substance Use Topics   • Alcohol use: No         Objective:  Visit Vitals  /68   Pulse 104   Resp 18   Ht 165.1 cm (65\")   Wt 72.1 kg (159 lb)   SpO2 (!) 88% Comment: on RA at rest.   BMI 26.46 kg/m²   O2Sat: 94% on 2-3 LPM at rest.     Physical Exam  Vitals reviewed.   Constitutional:       Appearance: She is well-developed.   HENT:      Head:      Comments: No acute lesions noted     Nose:      Comments: Mild nasal erythema noted.     Mouth/Throat:      Mouth: Mucous membranes are moist.   Neck:      Vascular: No JVD.   Cardiovascular:      Rate and Rhythm: Normal rate and regular rhythm.   Pulmonary:      Effort: Pulmonary effort is " normal.      Breath sounds: Wheezing present. No rales.      Comments: Somewhat hyperresonant to percussion.  Somewhat decreased air entry.  Mild scattered wheezing noted.   Musculoskeletal:      Cervical back: Neck supple.      Right lower leg: No edema.      Left lower leg: No edema.      Comments: Used a wheelchair.     Skin:     General: Skin is warm and dry.   Neurological:      Mental Status: She is alert and oriented to person, place, and time.   Psychiatric:         Mood and Affect: Mood normal.         Behavior: Behavior normal.         Assessment/Plan:  Diagnoses and all orders for this visit:    1. Shortness of breath (Primary)  -     Pulmonary Function Test; Future  -     Alpha - 1 - Antitrypsin Phenotype; Future  -     Blood Gas, Arterial -With Co-Ox Panel: Yes; Future    2. Chronic obstructive pulmonary disease, unspecified COPD type (HCC)  -     Pulmonary Function Test; Future  -     Alpha - 1 - Antitrypsin Phenotype; Future  -     Blood Gas, Arterial -With Co-Ox Panel: Yes; Future    3. Pulmonary emphysema, unspecified emphysema type (HCC)  -     Pulmonary Function Test; Future  -     Alpha - 1 - Antitrypsin Phenotype; Future  -     Blood Gas, Arterial -With Co-Ox Panel: Yes; Future    4. Hypoxia    5. Personal history of tobacco use, presenting hazards to health  Comments:  1 PPD for 30-35 years. Quit in Nov 2020.    Other orders  -     Budeson-Glycopyrrol-Formoterol (Breztri Aerosphere) 160-9-4.8 MCG/ACT aerosol inhaler; Inhale 2 puffs 2 (Two) Times a Day. Rinse mouth with water after use.  Dispense: 1 each; Refill: 5        Return in about 3 months (around 6/17/2022) for Recheck, PFT F/U, Labs, For George ([place]), ...Also 8 mths w/Larissa.    DISCUSSION(if any):  Last chest x-ray was reviewed personally and the results were shared with the patient.  Images reviewed personally.   Results for orders placed during the hospital encounter of 01/11/22    XR Chest 2 View    Narrative  FINAL  REPORT    CLINICAL HISTORY:  Cough and shortness of breath    FINDINGS:  Two views of the chest were obtained. The heart size and  pulmonary vascularity are within normal limits.  The mediastinum  is normal.  There is mild bibasilar atelectasis or scarring.  There is no pneumothorax.  The bony thorax is intact.    Impression  Mild bibasilar atelectasis or scarring.    Authenticated by Maximo Epps III, MD on 01/12/2022  09:56:12 AM      Last CT scan was reviewed in great detail with the patient. Images reviewed personally.   Results for orders placed during the hospital encounter of 10/15/21    CT Chest Without Contrast Diagnostic    Narrative  CT SCAN OF THE CHEST   10/15/2021 4:54 PM    HISTORY: Lung mass    COMPARISON: CT chest 07/15/2021    PROCEDURE: Axial images were obtained from the lung apex to the mid  abdomen by computed tomography. This study was performed with techniques  to keep radiation doses as low as reasonably achievable, (ALARA).  Individualized dose reduction techniques using automated exposure control or adjustment of mA and/or kV according to the patient size were employed.    FINDINGS:    CHEST: Pulmonary emphysema is noted. The previous nodular opacities in  the right upper lobe have resolved compared to July. This indicates it  was an infectious process that has resolved in the interval. No  suspicious nodule or mass. Minimal debris in the trachea. Aortic valve  calcification noted. Coronary artery plaque is seen. No adenopathy.  Normal caliber abdominal aorta.    Imaging through the upper abdomen shows left nephrolithiasis. Cyst of  each kidney, largest on the right measuring up to 5 cm with partial  peripheral calcification. Cholecystectomy. Tiny left hepatic lobe  lesion, likely cyst. 2.8 cm left adrenal adenoma is stable. T5  compression fracture, refer to dedicated CT thoracic spine.    Impression  1. Resolved right upper lobe opacities compared to July. No evidence of  malignancy.  2. T5 fracture.      This report was finalized on 10/16/2021 9:09 AM by Dr Moustapha Restrepo DO.      I have also reviewed her provider's office note that mentions hypoxemia, pulmonary emphysema and lung mass?.     I also reviewed her last echocardiogram and shared the results with her.   Results for orders placed during the hospital encounter of 06/15/20    Adult Transthoracic Echo Complete W/ Cont if Necessary Per Protocol    Interpretation Summary  1.  Normal left ventricular size with low normal LV systolic function, LVEF 50-55%.  2.  Mild concentric LVH.  3.  Normal LV diastolic filling pattern.  4.  Mild right ventricular dilation with normal RV systolic function.  5.  Normal left and right atrial size.  6.  Trace MR and TR.    ===========================  ===========================    PFTs were ordered for follow up visit.     Laboratory workup was also reviewed which showed   Lab Results   Component Value Date    HGB 15.0 12/14/2021    HGB 12.7 11/16/2021    HGB 11.6 (L) 01/07/2021   ,    Lab Results   Component Value Date    EOSABS 0.01 12/14/2021    EOSABS 0.1 11/16/2021    EOSABS 0.10 01/07/2021    & Laboratory workup also showed   Lab Results   Component Value Date    CO2 27.8 12/14/2021     ===========================  ===========================    Orders as above    Based on history and physical exam, it seems that her shortness of breath is most likely from obstructive lung disease.     Patient was educated on compliance with, and the correct method of using the pulmonary medicines.     Side effects, of prescribed medicines, discussed.    I have told her that we will made further recommendations, based on clinical response.     Patient was given reading material, as appropriate.     The patient was advised to continue oxygen 24/7.    I have encouraged the patient to undergo ABG testing as it may reveal hypercarbia.    If the ABG does reveal hypercarbia, then 1 option would be to consider  noninvasive ventilation device or BiPAP.    As far as abnormality on CT scan was concerned, it appears to have resolved based on the latest CT scan from October 2021.    The PET scan images were also reviewed which did not show any hypermetabolic nodule/opacities within the lung parenchyma.    I have encouraged the patient to call or schedule a visit earlier, if there are any concerns.    The following have been ordered, either before or at the time of the next visit.    Laboratory work-up  PFTs/spirometry      Dictated utilizing Dragon dictation.    This document was electronically signed by Herbert Poe MD on 03/17/22 at 14:43 EDT

## 2022-03-21 DIAGNOSIS — I48.0 PAROXYSMAL ATRIAL FIBRILLATION: ICD-10-CM

## 2022-03-22 LAB
A1AT PHENOTYP SERPL IFE: NORMAL
A1AT SERPL-MCNC: 163 MG/DL (ref 101–187)

## 2022-04-11 ENCOUNTER — DOCUMENTATION (OUTPATIENT)
Dept: PULMONOLOGY | Facility: CLINIC | Age: 77
End: 2022-04-11

## 2022-04-11 DIAGNOSIS — I48.0 PAROXYSMAL ATRIAL FIBRILLATION: ICD-10-CM

## 2022-04-11 DIAGNOSIS — J44.9 CHRONIC OBSTRUCTIVE PULMONARY DISEASE, UNSPECIFIED COPD TYPE: Primary | ICD-10-CM

## 2022-04-11 DIAGNOSIS — J43.9 PULMONARY EMPHYSEMA, UNSPECIFIED EMPHYSEMA TYPE: ICD-10-CM

## 2022-04-11 RX ORDER — APIXABAN 5 MG/1
TABLET, FILM COATED ORAL
Qty: 60 TABLET | Refills: 4 | OUTPATIENT
Start: 2022-04-11

## 2022-04-11 NOTE — PROGRESS NOTES
Called the patient's daughter and spoke to her about the ABG findings.    Lab Results   Component Value Date    PHART 7.393 03/17/2022    SWK9HQI 59.8 (C) 03/17/2022    PO2ART 51.7 (L) 03/17/2022    V1YXLAVA 88.0 (L) 03/17/2022    CARBOXYHGB 0.8 03/17/2022       Her ABG does suggest the need for NIV device especially given her history of recent exacerbations.  She will however need to undergo PFTs and these will be scheduled to be done in the hospital.    The patient will need to use the noninvasive ventilator for nocturnal and daytime use. Due to severity of the condition, BiPAP alone would be insufficient to maintain adequate tidal volumes.  Severe COPD seems to be the primary cause of chronic respiratory failure in this patient requiring noninvasive ventilator. There maybe serious detriment to the patient's health, including the possibility of recurrent exacerbations, worsening symptoms/respiratory status etc., if noninvasive ventilator is not used.    Once the patient's NIV device is arranged, I would suggest the following settings.    Vt: 400 ml; Rate: 14/min; PS min: 6; PS Max: 25; EPAP min: 4; EPAP Max: 14.  AVAPS rate: Auto  Max Pressure: 25.  3LPM oxygen bled in.  Heated humidifier.  Use during sleep.    Please send me compliance in 4-5 weeks  Adjustment can be made for patient comfort.  Minimum target tidal volume is 350 ml.    This document was electronically signed by Herbert Poe MD on 04/11/22 at 13:07 EDT

## 2022-04-13 ENCOUNTER — TELEPHONE (OUTPATIENT)
Dept: CARDIOLOGY | Facility: CLINIC | Age: 77
End: 2022-04-13

## 2022-04-13 DIAGNOSIS — I48.0 PAROXYSMAL ATRIAL FIBRILLATION: ICD-10-CM

## 2022-04-13 NOTE — TELEPHONE ENCOUNTER
Patient called office requesting Eliquis samples. She has been out of this medication x3 days now. We curently do not have any samples in office and have contacted the drug rep numerous times. She is unable to use the co-pay cards due to having medicare insurance. Patient is requesting to be changed to a different medication.

## 2022-04-14 NOTE — TELEPHONE ENCOUNTER
Pts daughter called back stating the coupon does not work for Pt and was asking if we had gotten samples. Pts daughter was told we had just received some and I will leave some at the  for her.

## 2022-04-20 ENCOUNTER — OFFICE VISIT (OUTPATIENT)
Dept: INTERNAL MEDICINE | Facility: CLINIC | Age: 77
End: 2022-04-20

## 2022-04-20 VITALS
SYSTOLIC BLOOD PRESSURE: 120 MMHG | TEMPERATURE: 96.8 F | WEIGHT: 165 LBS | DIASTOLIC BLOOD PRESSURE: 74 MMHG | BODY MASS INDEX: 27.49 KG/M2 | OXYGEN SATURATION: 90 % | HEIGHT: 65 IN | HEART RATE: 104 BPM

## 2022-04-20 DIAGNOSIS — R11.0 NAUSEA: ICD-10-CM

## 2022-04-20 DIAGNOSIS — E55.9 VITAMIN D DEFICIENCY, UNSPECIFIED: ICD-10-CM

## 2022-04-20 DIAGNOSIS — R04.0 EPISTAXIS: ICD-10-CM

## 2022-04-20 DIAGNOSIS — E78.00 PURE HYPERCHOLESTEROLEMIA: ICD-10-CM

## 2022-04-20 DIAGNOSIS — I10 PRIMARY HYPERTENSION: Primary | ICD-10-CM

## 2022-04-20 DIAGNOSIS — R32 URINARY INCONTINENCE, UNSPECIFIED TYPE: ICD-10-CM

## 2022-04-20 DIAGNOSIS — K21.9 GASTROESOPHAGEAL REFLUX DISEASE WITHOUT ESOPHAGITIS: ICD-10-CM

## 2022-04-20 DIAGNOSIS — R91.1 LUNG NODULE: ICD-10-CM

## 2022-04-20 DIAGNOSIS — Z00.00 WELLNESS EXAMINATION: ICD-10-CM

## 2022-04-20 DIAGNOSIS — D35.00 ADRENAL ADENOMA, UNSPECIFIED LATERALITY: ICD-10-CM

## 2022-04-20 DIAGNOSIS — J42 CHRONIC BRONCHITIS, UNSPECIFIED CHRONIC BRONCHITIS TYPE: ICD-10-CM

## 2022-04-20 PROBLEM — K59.1 FUNCTIONAL DIARRHEA: Status: RESOLVED | Noted: 2021-12-23 | Resolved: 2022-04-20

## 2022-04-20 PROBLEM — D64.9 ANEMIA: Status: RESOLVED | Noted: 2020-11-09 | Resolved: 2022-04-20

## 2022-04-20 PROBLEM — J98.4 LUNG ABNORMALITY: Status: RESOLVED | Noted: 2020-07-16 | Resolved: 2022-04-20

## 2022-04-20 PROCEDURE — 99214 OFFICE O/P EST MOD 30 MIN: CPT | Performed by: INTERNAL MEDICINE

## 2022-04-20 RX ORDER — PROMETHAZINE HYDROCHLORIDE 25 MG/1
25 TABLET ORAL EVERY 8 HOURS PRN
Qty: 30 TABLET | Refills: 1 | Status: SHIPPED | OUTPATIENT
Start: 2022-04-20 | End: 2023-01-26 | Stop reason: HOSPADM

## 2022-04-20 RX ORDER — OXYBUTYNIN CHLORIDE 10 MG/1
10 TABLET, EXTENDED RELEASE ORAL DAILY
Qty: 90 TABLET | Refills: 3 | Status: SHIPPED | OUTPATIENT
Start: 2022-04-20 | End: 2022-08-04 | Stop reason: SDDI

## 2022-04-20 RX ORDER — BENZONATATE 100 MG/1
100 CAPSULE ORAL 3 TIMES DAILY PRN
Qty: 60 CAPSULE | Refills: 1 | Status: SHIPPED | OUTPATIENT
Start: 2022-04-20 | End: 2022-12-05 | Stop reason: SDUPTHER

## 2022-04-20 NOTE — PROGRESS NOTES
Subjective   Gabi Dudley is a 77 y.o. female.     Chief Complaint   Patient presents with   • Follow-up   • Hypertension       History of Present Illness   Patient here for follow-up. COPD stable medication patient still has some chronic cough. Blood pressure stable on medication. Hyperlipidemia stable medication. Reflux disease occasional nausea. Lung nodule on PET scan 4 mm. Sinusitis patient is taking medication occasional epistaxis from the right nostril. Your incontinence is stable medication. Vitamin D deficiency supplement stable. Osteoporosis on medication stable.    Current Outpatient Medications:   •  albuterol sulfate  (90 Base) MCG/ACT inhaler, INHALE 2 PUFFS BY MOUTH EVERY 4 HOURS AS NEEDED FOR WHEEZING, Disp: 18 g, Rfl: 5  •  apixaban (Eliquis) 5 MG tablet tablet, Take 1 tablet by mouth Every 12 (Twelve) Hours., Disp: 60 tablet, Rfl: 11  •  atorvastatin (LIPITOR) 20 MG tablet, TAKE 1 TABLET BY MOUTH EVERY DAY, Disp: 90 tablet, Rfl: 3  •  Azelastine HCl 137 MCG/SPRAY solution, 1 spray into the nostril(s) as directed by provider 2 (Two) Times a Day., Disp: 30 mL, Rfl: 5  •  Budeson-Glycopyrrol-Formoterol (Breztri Aerosphere) 160-9-4.8 MCG/ACT aerosol inhaler, Inhale 2 puffs 2 (Two) Times a Day. Rinse mouth with water after use., Disp: 1 each, Rfl: 5  •  Cholecalciferol (vitamin D3) 125 MCG (5000 UT) capsule capsule, Take 5,000 Units by mouth Daily., Disp: , Rfl:   •  clonazePAM (KlonoPIN) 1 MG tablet, Take 1 tablet by mouth Daily As Needed for Anxiety., Disp: 30 tablet, Rfl: 0  •  clotrimazole-betamethasone (Lotrisone) 1-0.05 % cream, Apply  topically to the appropriate area as directed 2 (Two) Times a Day., Disp: 45 g, Rfl: 1  •  DULoxetine (CYMBALTA) 60 MG capsule, TAKE ONE CAPSULE BY MOUTH TWICE DAILY, Disp: 180 capsule, Rfl: 3  •  HYDROcodone-acetaminophen (NORCO)  MG per tablet, Take 1 tablet by mouth 4 (Four) Times a Day As Needed., Disp: , Rfl:   •  ipratropium-albuterol (DUO-NEB)  0.5-2.5 mg/3 ml nebulizer, USE 3 ML VIA NEBULIZER EVERY 4 HOURS AS NEEDED FOR WHEEZING, Disp: 360 mL, Rfl: 11  •  loperamide (IMODIUM) 2 MG capsule, Take 1 capsule by mouth 4 (Four) Times a Day As Needed for Diarrhea., Disp: 20 capsule, Rfl: 0  •  O2 (OXYGEN), Inhale 2 L/min As Needed., Disp: , Rfl:   •  ondansetron (ZOFRAN) 4 MG tablet, Take 1 tablet by mouth Every 8 (Eight) Hours As Needed for Nausea or Vomiting., Disp: 9 tablet, Rfl: 0  •  oxybutynin XL (DITROPAN-XL) 10 MG 24 hr tablet, Take 1 tablet by mouth Daily., Disp: 90 tablet, Rfl: 3  •  pantoprazole (PROTONIX) 40 MG EC tablet, TAKE 1 TABLET BY MOUTH DAILY, Disp: 90 tablet, Rfl: 3  •  sertraline (ZOLOFT) 100 MG tablet, TAKE 1 AND 1/2 TABLETS BY MOUTH DAILY, Disp: 180 tablet, Rfl: 3  •  Sodium Fluoride 1.1 % gel, USE AS DIRECTED ON LABEL, Disp: , Rfl:   •  traZODone (DESYREL) 150 MG tablet, Take 1 tablet by mouth Every Evening., Disp: 90 tablet, Rfl: 0  •  benzonatate (Tessalon Perles) 100 MG capsule, Take 1 capsule by mouth 3 (Three) Times a Day As Needed for Cough., Disp: 60 capsule, Rfl: 1  •  promethazine (PHENERGAN) 25 MG tablet, Take 1 tablet by mouth Every 8 (Eight) Hours As Needed for Nausea or Vomiting., Disp: 30 tablet, Rfl: 1    The following portions of the patient's history were reviewed and updated as appropriate: allergies, current medications, past family history, past medical history, past social history, past surgical history and problem list.    Review of Systems   Constitutional: Negative.    HENT: Positive for congestion.    Respiratory: Positive for cough.    Cardiovascular: Negative.    Gastrointestinal: Positive for nausea.   Musculoskeletal: Negative.    Skin: Negative.    Neurological: Negative.    Psychiatric/Behavioral: Negative.        Objective   Physical Exam  Cardiovascular:      Rate and Rhythm: Normal rate and regular rhythm.      Heart sounds: Normal heart sounds.   Pulmonary:      Effort: Pulmonary effort is normal.       Breath sounds: Normal breath sounds.   Abdominal:      General: Bowel sounds are normal.   Musculoskeletal:      Cervical back: Neck supple.   Skin:     General: Skin is warm.   Neurological:      Mental Status: She is alert and oriented to person, place, and time.         All tests have been reviewed.    Assessment/Plan   Diagnoses and all orders for this visit:    Primary hypertension continue medication    Pure hypercholesterolemia continue medication    Chronic bronchitis, unspecified chronic bronchitis type (HCC) initiate medication  -     benzonatate (Tessalon Perles) 100 MG capsule; Take 1 capsule by mouth 3 (Three) Times a Day As Needed for Cough.    Nausea initiate medication  -     promethazine (PHENERGAN) 25 MG tablet; Take 1 tablet by mouth Every 8 (Eight) Hours As Needed for Nausea or Vomiting.    Gastroesophageal reflux disease without esophagitis continue medication    Adrenal adenoma, pending neurology    Lung nodule 4 mm repeat March next year    Wellness examination next time  -     CBC & Differential  -     Comprehensive Metabolic Panel  -     Lipid Panel  -     TSH  -     Vitamin D 25 Hydroxy    Vitamin D deficiency, unspecified   -     Vitamin D 25 Hydroxy    Urinary incontinence, unspecified type refill today  -     oxybutynin XL (DITROPAN-XL) 10 MG 24 hr tablet; Take 1 tablet by mouth Daily.    Epistaxis probably due to allergy patient denies any bleeding. Patient declines to see ENT.    Three months for wellness after labs          Primary hypertension continue medication     Paroxysmal atrial fibrillation (HCC) continue medication     Compression fracture of T5, T7  Pain resolved without procedure      Osteoporosis, unspecified osteoporosis type, unspecified pathological fracture presence follow-up with rheumatology on prolia      Adrenal adenoma, unspecified laterality refer to urology  -     Ambulatory Referral to Urology 5/2022      Other emphysema (HCC) follow-up with pulmonology       Lung nodule  -     NM PET/CT Whole Body negative except small nodule 4mm repeat 3/2022    Sinusitis on CT cont med otc      6 mo and do lab now   ----historical data below  S/p cholecystectomy   tob, quit already. CT lung 8/2020, lung nodule, scarring and emphysema.   anxiety continue zoloft 150 mg daily.   and klonopin 1mg, 1/2 tab po prn , again emphasized as needed basis.  Continue cymbalta 60 bid.  Patient refused to take BuSpar or see psychiatrist.  CT scan showed liver cyst, large kidney cyst, adrenal gland adenoma, kidney stones non-obstructing. repeat showed no change 7/2020,benign per radiologist  right knee OA on xray aleve prn knee brace help  hip pain s/p steroid helped improved  Osteoporosis continue medication, refuse dexa   heme+, Colon polyps repeat 6/2019. EGD done  COPD continue medication. need to Quit tobacco. tudorsa, proair, continue O2 prn, symbicort refill nebulizer  Fibromyalgia stable watch continue medicine and follow up with pain clinic,with lortab  Tension HA continue flexeril and naproxen  sinus polyp per dentist, obtain rec.  ref to ENT.patient refuses, azelastine and Claritin D 5 mg in the morning  Nipple retraction mamm and US:negative pathology  Cysts in liver and kidney, watch  Kidney lesion renal image CT renal protocol cyst and stone non obstructing  A.fib and pericardia eff, follow cardio patient to schedule  Liver cyst on CT watch  Right neck SK watch for now, patient to call if increase in size or change in color  Colon 7/24/19, repeat in 3 years  CT scan showed diverticulosis

## 2022-05-02 ENCOUNTER — OFFICE VISIT (OUTPATIENT)
Dept: UROLOGY | Facility: CLINIC | Age: 77
End: 2022-05-02

## 2022-05-02 VITALS
WEIGHT: 165 LBS | HEIGHT: 65 IN | SYSTOLIC BLOOD PRESSURE: 122 MMHG | BODY MASS INDEX: 27.49 KG/M2 | DIASTOLIC BLOOD PRESSURE: 78 MMHG | HEART RATE: 100 BPM | OXYGEN SATURATION: 99 % | TEMPERATURE: 96.9 F

## 2022-05-02 DIAGNOSIS — D35.02 ADENOMA OF LEFT ADRENAL GLAND: Primary | ICD-10-CM

## 2022-05-02 DIAGNOSIS — N39.41 URGE INCONTINENCE: ICD-10-CM

## 2022-05-02 PROCEDURE — 99203 OFFICE O/P NEW LOW 30 MIN: CPT | Performed by: UROLOGY

## 2022-05-02 RX ORDER — DEXAMETHASONE 1 MG
1 TABLET ORAL ONCE
Qty: 1 TABLET | Refills: 0 | Status: SHIPPED | OUTPATIENT
Start: 2022-05-02 | End: 2022-05-03

## 2022-05-02 NOTE — PROGRESS NOTES
Chief Complaint  Adrenal mass    Referring Provider:  Dr. Neli CISNEROS  Ms. Dudley is a 77 y.o. female with below past medical history who presents with 2.8 cm left adrenal mass, incidentally found on pulmonary nodule work-up blood PET CT.    She says she has excessive sweating sometimes during the daytime.  She admits to an extreme fear of close spaces, and does have panic attacks, but it is usually related to claustrophobia and does not happen without provocation.  She denies difficulty controlling blood pressure or blood sugar.    She does have a history of urge incontinence and is on Ditropan.    Past Medical History  Past Medical History:   Diagnosis Date   • Adrenal adenoma    • Anemia    • Arrhythmia    • Asthma    • Atrial fibrillation (HCC)    • Back pain    • Benign colonic polyp    • Benign tumor of adrenal gland    • Cataract     bilateral   • Cholelithiasis    • Chronic bronchitis (HCC)    • Chronic bronchitis with COPD (chronic obstructive pulmonary disease) (HCC)    • COPD (chronic obstructive pulmonary disease) (HCC) 2005   • Coronary artery disease    • Depression    • Elevated cholesterol    • Environmental and seasonal allergies    • Fibromyalgia    • Gastritis    • Generalized anxiety disorder    • GERD (gastroesophageal reflux disease)    • H/O mammogram    • Hemorrhoids    • History of blood transfusion 1985   • History of blood transfusion 1985   • History of echocardiogram    • History of endometriosis    • History of nuclear stress test    • Hypertension    • IBS (irritable bowel syndrome)    • Impaired functional mobility, balance, gait, and endurance    • Inverted nipple    • Kidney disease    • Kidney stone    • Liver cyst    • Nodular radiologic density    • Nodule of left lung    • On home oxygen therapy     2 liters NC QHS   • Osteoarthritis    • Osteoporosis    • PONV (postoperative nausea and vomiting)    • Renal cyst    • Sinus problem     2014   • Sinusitis    • Skin cancer     basal  cell carcinoma   • SOB (shortness of breath)    • Tobacco use    • Urinary frequency    • Vitamin D deficiency    • Wears glasses    • Wears partial dentures     upper plate       Past Surgical History  Past Surgical History:   Procedure Laterality Date   • APPENDECTOMY  1980s   • CARDIAC CATHETERIZATION  2003   • CATARACT EXTRACTION  2013    both eyes   • CHOLECYSTECTOMY WITH INTRAOPERATIVE CHOLANGIOGRAM N/A 10/30/2020    Procedure: CHOLECYSTECTOMY LAPAROSCOPIC INTRAOPERATIVE CHOLANGIOGRAPHY;  Surgeon: Sima Moses MD;  Location: [facility] OR;  Service: General;  Laterality: N/A;   • COLONOSCOPY  03/11/2013   • COLONOSCOPY  06/21/2016   • COLONOSCOPY N/A 7/24/2019    Procedure: COLONOSCOPY W/ COLD FORCEP POLYPECTOMIES; HOT SNARE POLYPECTOMIES; COLD SNARE POLYPECTOMY;  Surgeon: Goyo Nunez MD;  Location: [facility] ENDOSCOPY;  Service: Gastroenterology   • COLONOSCOPY W/ BIOPSIES AND POLYPECTOMY     • EXPLORATORY LAPAROTOMY N/A 11/23/2020    Procedure: colectomy, right, closure of enterotomy x 2, reduction of internal volvulus;  Surgeon: Sima Moses MD;  Location: [facility] OR;  Service: General;  Laterality: N/A;   • HYSTERECTOMY  1980s    partial   • LYSIS OF ABDOMINAL ADHESIONS     • TONSILLECTOMY  1987   • UPPER GASTROINTESTINAL ENDOSCOPY  01/13/2015   • VAGINAL DELIVERY      x2       Medications    Current Outpatient Medications:   •  albuterol sulfate  (90 Base) MCG/ACT inhaler, INHALE 2 PUFFS BY MOUTH EVERY 4 HOURS AS NEEDED FOR WHEEZING, Disp: 18 g, Rfl: 5  •  apixaban (Eliquis) 5 MG tablet tablet, Take 1 tablet by mouth Every 12 (Twelve) Hours., Disp: 60 tablet, Rfl: 11  •  atorvastatin (LIPITOR) 20 MG tablet, TAKE 1 TABLET BY MOUTH EVERY DAY, Disp: 90 tablet, Rfl: 3  •  Azelastine HCl 137 MCG/SPRAY solution, 1 spray into the nostril(s) as directed by provider 2 (Two) Times a Day., Disp: 30 mL, Rfl: 5  •  benzonatate (Tessalon Perles) 100 MG capsule, Take 1 capsule by mouth 3 (Three) Times a Day  As Needed for Cough., Disp: 60 capsule, Rfl: 1  •  Budeson-Glycopyrrol-Formoterol (Breztri Aerosphere) 160-9-4.8 MCG/ACT aerosol inhaler, Inhale 2 puffs 2 (Two) Times a Day. Rinse mouth with water after use., Disp: 1 each, Rfl: 5  •  Cholecalciferol (vitamin D3) 125 MCG (5000 UT) capsule capsule, Take 5,000 Units by mouth Daily., Disp: , Rfl:   •  clonazePAM (KlonoPIN) 1 MG tablet, Take 1 tablet by mouth Daily As Needed for Anxiety., Disp: 30 tablet, Rfl: 0  •  clotrimazole-betamethasone (Lotrisone) 1-0.05 % cream, Apply  topically to the appropriate area as directed 2 (Two) Times a Day., Disp: 45 g, Rfl: 1  •  DULoxetine (CYMBALTA) 60 MG capsule, TAKE ONE CAPSULE BY MOUTH TWICE DAILY, Disp: 180 capsule, Rfl: 3  •  HYDROcodone-acetaminophen (NORCO)  MG per tablet, Take 1 tablet by mouth 4 (Four) Times a Day As Needed., Disp: , Rfl:   •  ipratropium-albuterol (DUO-NEB) 0.5-2.5 mg/3 ml nebulizer, USE 3 ML VIA NEBULIZER EVERY 4 HOURS AS NEEDED FOR WHEEZING, Disp: 360 mL, Rfl: 11  •  loperamide (IMODIUM) 2 MG capsule, Take 1 capsule by mouth 4 (Four) Times a Day As Needed for Diarrhea., Disp: 20 capsule, Rfl: 0  •  O2 (OXYGEN), Inhale 2 L/min As Needed., Disp: , Rfl:   •  ondansetron (ZOFRAN) 4 MG tablet, Take 1 tablet by mouth Every 8 (Eight) Hours As Needed for Nausea or Vomiting., Disp: 9 tablet, Rfl: 0  •  oxybutynin XL (DITROPAN-XL) 10 MG 24 hr tablet, Take 1 tablet by mouth Daily., Disp: 90 tablet, Rfl: 3  •  pantoprazole (PROTONIX) 40 MG EC tablet, TAKE 1 TABLET BY MOUTH DAILY, Disp: 90 tablet, Rfl: 3  •  promethazine (PHENERGAN) 25 MG tablet, Take 1 tablet by mouth Every 8 (Eight) Hours As Needed for Nausea or Vomiting., Disp: 30 tablet, Rfl: 1  •  sertraline (ZOLOFT) 100 MG tablet, TAKE 1 AND 1/2 TABLETS BY MOUTH DAILY, Disp: 180 tablet, Rfl: 3  •  Sodium Fluoride 1.1 % gel, USE AS DIRECTED ON LABEL, Disp: , Rfl:   •  traZODone (DESYREL) 150 MG tablet, Take 1 tablet by mouth Every Evening., Disp: 90  tablet, Rfl: 0    Allergies  Allergies   Allergen Reactions   • Doxycycline GI Intolerance       Social History  Social History     Socioeconomic History   • Marital status:    Tobacco Use   • Smoking status: Former Smoker     Packs/day: 0.25     Years: 30.00     Pack years: 7.50     Types: Cigarettes     Quit date: 2020     Years since quittin.4   • Smokeless tobacco: Never Used   Vaping Use   • Vaping Use: Never used   Substance and Sexual Activity   • Alcohol use: No   • Drug use: No   • Sexual activity: Defer     Family History  She has no family history of adrenal cancer.    Review of Systems  Review of systems was notable for COPD, atrial fibrillation.    Physical Exam  There were no vitals taken for this visit.  Physical exam was notable for on 2 L COPD, tripoding.    Labs  Brief Urine Lab Results  (Last result in the past 365 days)      Color   Clarity   Blood   Leuk Est   Nitrite   Protein   CREAT   Urine HCG        21 Yellow   Clear   Negative   Trace   Negative   100 mg/dL (2+)                      Lab Results   Component Value Date    GLUCOSE 125 (H) 2021    CALCIUM 9.9 2021     2021    K 4.2 2021    CO2 27.8 2021    CL 99 2021    BUN 17 2021    CREATININE 0.60 2021    EGFRIFAFRI 80 2021    EGFRIFNONA 98 2021    BCR 28.3 (H) 2021    ANIONGAP 12.2 2021       Lab Results   Component Value Date    WBC 12.15 (H) 2021    HGB 15.0 2021    HCT 47.3 (H) 2021    MCV 90.6 2021     2021         Radiographic Studies  NM PET/CT Skull Base to Mid Thigh  Result Date: 3/9/2022   1.  Severe age-indeterminate compression fractures at T5 and T7 levels. These are age-indeterminate but new from  and a chest radiograph of 10/12/2021. 2.  No abnormal hypermetabolic activity seen within the neck, chest, abdomen, or pelvis. 3.  Mucosal thickening and air-fluid level in the right  maxillary sinus with some associated hypermetabolic activity.  Findings would be consistent with acute on chronic right maxillary sinusitis. 4.  Noncalcified 4 mm right lower lobe pulmonary nodule.  This is too small for reliable detection with PET scanning.  No hypermetabolic activity seen within the chest.  Follow-up CT scan of the chest could be performed in 12 months to document stability of this nodule.  This report was finalized on 3/9/2022 5:05 PM by Awais Villavicencio MD.      I reviewed her labs and imaging      Assessment  Ms. Dudley is a 77 y.o. female with 2.8 cm left adrenal nodule/mass, incidentally identified.  This has been known to her for several years.  It is likely benign.  Her symptoms are most likely compatible with anxiety/claustrophobia, however we will make sure that the tumor is not functional.  Chronic stable urge incontinence on Ditropan    Plan  1.  Functional work-up with plasma metanephrines, dexamethasone suppression test, and BMP  2.  Follow-up in 3 weeks to discuss labs.  We will discuss her urge incontinence at that time as well.  3.  CT adrenal mass protocol with washout in 6 months to assure stability      Dajuan Blackman MD

## 2022-05-04 ENCOUNTER — HOSPITAL ENCOUNTER (OUTPATIENT)
Dept: PULMONOLOGY | Facility: HOSPITAL | Age: 77
Discharge: HOME | End: 2022-05-04
Admitting: INTERNAL MEDICINE

## 2022-05-04 DIAGNOSIS — J43.9 PULMONARY EMPHYSEMA, UNSPECIFIED EMPHYSEMA TYPE: ICD-10-CM

## 2022-05-04 DIAGNOSIS — J44.9 CHRONIC OBSTRUCTIVE PULMONARY DISEASE, UNSPECIFIED COPD TYPE: ICD-10-CM

## 2022-05-04 PROCEDURE — 94060 EVALUATION OF WHEEZING: CPT

## 2022-05-04 PROCEDURE — 94726 PLETHYSMOGRAPHY LUNG VOLUMES: CPT

## 2022-05-04 PROCEDURE — 94060 EVALUATION OF WHEEZING: CPT | Performed by: INTERNAL MEDICINE

## 2022-05-04 PROCEDURE — 63710000001 ALBUTEROL SULFATE HFA 108 (90 BASE) MCG/ACT AEROSOL SOLUTION 8.5 G INHALER: Performed by: INTERNAL MEDICINE

## 2022-05-04 PROCEDURE — 94726 PLETHYSMOGRAPHY LUNG VOLUMES: CPT | Performed by: INTERNAL MEDICINE

## 2022-05-04 PROCEDURE — A9270 NON-COVERED ITEM OR SERVICE: HCPCS | Performed by: INTERNAL MEDICINE

## 2022-05-04 PROCEDURE — 94640 AIRWAY INHALATION TREATMENT: CPT

## 2022-05-04 RX ORDER — ALBUTEROL SULFATE 90 UG/1
2 AEROSOL, METERED RESPIRATORY (INHALATION)
Status: DISCONTINUED | OUTPATIENT
Start: 2022-05-04 | End: 2022-05-05 | Stop reason: HOSPADM

## 2022-05-04 RX ADMIN — ALBUTEROL SULFATE 2 PUFF: 90 AEROSOL, METERED RESPIRATORY (INHALATION) at 14:29

## 2022-05-06 LAB
25(OH)D3+25(OH)D2 SERPL-MCNC: 21.8 NG/ML (ref 30–100)
ALBUMIN SERPL-MCNC: 4.3 G/DL (ref 3.5–5.2)
ALBUMIN/GLOB SERPL: 1.5 G/DL
ALP SERPL-CCNC: 84 U/L (ref 39–117)
ALT SERPL-CCNC: 11 U/L (ref 1–33)
AST SERPL-CCNC: 17 U/L (ref 1–32)
BASOPHILS # BLD AUTO: 0.03 10*3/MM3 (ref 0–0.2)
BASOPHILS NFR BLD AUTO: 0.5 % (ref 0–1.5)
BILIRUB SERPL-MCNC: 0.2 MG/DL (ref 0–1.2)
BUN SERPL-MCNC: 15 MG/DL (ref 8–23)
BUN/CREAT SERPL: 22.7 (ref 7–25)
CALCIUM SERPL-MCNC: 9 MG/DL (ref 8.6–10.5)
CHLORIDE SERPL-SCNC: 103 MMOL/L (ref 98–107)
CHOLEST SERPL-MCNC: 165 MG/DL (ref 0–200)
CO2 SERPL-SCNC: 27.6 MMOL/L (ref 22–29)
CREAT SERPL-MCNC: 0.66 MG/DL (ref 0.57–1)
EGFRCR SERPLBLD CKD-EPI 2021: 91.6 ML/MIN/1.73
EOSINOPHIL # BLD AUTO: 0 10*3/MM3 (ref 0–0.4)
EOSINOPHIL NFR BLD AUTO: 0 % (ref 0.3–6.2)
ERYTHROCYTE [DISTWIDTH] IN BLOOD BY AUTOMATED COUNT: 13.6 % (ref 12.3–15.4)
GLOBULIN SER CALC-MCNC: 2.8 GM/DL
GLUCOSE SERPL-MCNC: 127 MG/DL (ref 65–99)
HCT VFR BLD AUTO: 39.4 % (ref 34–46.6)
HDLC SERPL-MCNC: 79 MG/DL (ref 40–60)
HGB BLD-MCNC: 12.3 G/DL (ref 12–15.9)
IMM GRANULOCYTES # BLD AUTO: 0.02 10*3/MM3 (ref 0–0.05)
IMM GRANULOCYTES NFR BLD AUTO: 0.3 % (ref 0–0.5)
LDLC SERPL CALC-MCNC: 74 MG/DL (ref 0–100)
LYMPHOCYTES # BLD AUTO: 1.25 10*3/MM3 (ref 0.7–3.1)
LYMPHOCYTES NFR BLD AUTO: 19.5 % (ref 19.6–45.3)
MCH RBC QN AUTO: 26.1 PG (ref 26.6–33)
MCHC RBC AUTO-ENTMCNC: 31.2 G/DL (ref 31.5–35.7)
MCV RBC AUTO: 83.7 FL (ref 79–97)
MONOCYTES # BLD AUTO: 0.28 10*3/MM3 (ref 0.1–0.9)
MONOCYTES NFR BLD AUTO: 4.4 % (ref 5–12)
NEUTROPHILS # BLD AUTO: 4.84 10*3/MM3 (ref 1.7–7)
NEUTROPHILS NFR BLD AUTO: 75.3 % (ref 42.7–76)
NRBC BLD AUTO-RTO: 0 /100 WBC (ref 0–0.2)
PLATELET # BLD AUTO: 296 10*3/MM3 (ref 140–450)
POTASSIUM SERPL-SCNC: 4.6 MMOL/L (ref 3.5–5.2)
PROT SERPL-MCNC: 7.1 G/DL (ref 6–8.5)
RBC # BLD AUTO: 4.71 10*6/MM3 (ref 3.77–5.28)
SODIUM SERPL-SCNC: 144 MMOL/L (ref 136–145)
TRIGL SERPL-MCNC: 62 MG/DL (ref 0–150)
TSH SERPL DL<=0.005 MIU/L-ACNC: 0.63 UIU/ML (ref 0.27–4.2)
VLDLC SERPL CALC-MCNC: 12 MG/DL (ref 5–40)
WBC # BLD AUTO: 6.42 10*3/MM3 (ref 3.4–10.8)

## 2022-05-09 DIAGNOSIS — F41.9 ANXIETY: ICD-10-CM

## 2022-05-09 NOTE — TELEPHONE ENCOUNTER
Caller: Debra Aguilarly    Relationship: Emergency Contact    Best call back number:     [phone]    Requested Prescriptions:   Requested Prescriptions     Pending Prescriptions Disp Refills   • clonazePAM (KlonoPIN) 1 MG tablet 30 tablet 0     Sig: Take 1 tablet by mouth Daily As Needed for Anxiety.      Pharmacy where request should be sent: [facility] [place] [facility] [address]  - [phone]  - [phone] FX     Additional details provided by patient:     CALLER STATED PATIENT IS COMPLETELY OUT OF THE MEDICATION    Does the patient have less than a 3 day supply:  [x] Yes  [] No    Britt Rooney, Rafa Rep   05/09/22 16:27 EDT     DR PRADHAN

## 2022-05-10 RX ORDER — CLONAZEPAM 1 MG/1
1 TABLET ORAL DAILY PRN
Qty: 30 TABLET | Refills: 0 | Status: SHIPPED | OUTPATIENT
Start: 2022-05-10 | End: 2022-06-14 | Stop reason: SDUPTHER

## 2022-05-10 NOTE — TELEPHONE ENCOUNTER
Rx Refill Note  Requested Prescriptions     Pending Prescriptions Disp Refills   • clonazePAM (KlonoPIN) 1 MG tablet 30 tablet 0     Sig: Take 1 tablet by mouth Daily As Needed for Anxiety.      Last office visit with prescribing clinician: 4/20/2022      Next office visit with prescribing clinician: 7/5/2022            LETICIA CHURCH MA  05/10/22, 08:09 EDT

## 2022-05-24 ENCOUNTER — OFFICE VISIT (OUTPATIENT)
Dept: UROLOGY | Facility: CLINIC | Age: 77
End: 2022-05-24

## 2022-05-24 DIAGNOSIS — N39.41 URGE INCONTINENCE: ICD-10-CM

## 2022-05-24 DIAGNOSIS — D35.02 ADENOMA OF LEFT ADRENAL GLAND: Primary | ICD-10-CM

## 2022-05-24 PROCEDURE — 99442 PR PHYS/QHP TELEPHONE EVALUATION 11-20 MIN: CPT | Performed by: UROLOGY

## 2022-05-25 NOTE — PROGRESS NOTES
11-minute telephone conversation with the patient's daughter.    All her labs for work-up of the adrenal adenoma were normal/as expected, indicating no abnormal hormone production.  We will see her in 6 months with repeat CT adrenal mass protocol as planned regarding this.    We then discussed the urge incontinence.  The patient's daughter thinks that she is likely still using at least 1-2 pads a day, despite oxybutynin.  I expressed concern with long-term anticholinergics for elderly patients, and the strong evidence linking this medication class to dementia/cognitive dysfunction.  We discussed alternative therapies like Botox and InterStim.  The patient's daughter will do some research and we will discuss it at next visit.    You have chosen to receive care through a telephone visit. Do you consent to use a telephone visit for your medical care today? Yes

## 2022-05-26 RX ORDER — SERTRALINE HYDROCHLORIDE 100 MG/1
TABLET, FILM COATED ORAL
Qty: 135 TABLET | Refills: 3 | Status: SHIPPED | OUTPATIENT
Start: 2022-05-26 | End: 2022-11-01

## 2022-05-26 NOTE — TELEPHONE ENCOUNTER
Rx Refill Note  Requested Prescriptions     Pending Prescriptions Disp Refills   • sertraline (ZOLOFT) 100 MG tablet [Pharmacy Med Name: Sertraline HCl 100MG TABS] 135 tablet      Sig: TAKE 1 & 1/2 TABLET BY MOUTH DAILY      Last office visit with prescribing clinician: 4/20/2022      Next office visit with prescribing clinician: 7/5/2022            Jackie Leone MA  05/26/22, 15:58 EDT

## 2022-06-02 DIAGNOSIS — I48.0 PAROXYSMAL ATRIAL FIBRILLATION: ICD-10-CM

## 2022-06-14 DIAGNOSIS — F41.9 ANXIETY: ICD-10-CM

## 2022-06-14 RX ORDER — CLONAZEPAM 1 MG/1
1 TABLET ORAL DAILY PRN
Qty: 30 TABLET | Refills: 0 | Status: SHIPPED | OUTPATIENT
Start: 2022-06-14 | End: 2022-07-12

## 2022-06-14 NOTE — TELEPHONE ENCOUNTER
Incoming Refill Request      Medication requested (name and dose): clonazePAM (KlonoPIN) 1 MG tablet    Pharmacy where request should be sent: [facility], [place], operated [facility]    Best call back number: [phone]     Rafa Ham Rep  06/14/22, 16:01 EDT

## 2022-06-14 NOTE — TELEPHONE ENCOUNTER
Rx Refill Note  Requested Prescriptions     Pending Prescriptions Disp Refills   • clonazePAM (KlonoPIN) 1 MG tablet 30 tablet 0     Sig: Take 1 tablet by mouth Daily As Needed for Anxiety.      Last office visit with prescribing clinician: 4/20/2022      Next office visit with prescribing clinician: 7/5/2022            Jackie Leone MA  06/14/22, 16:26 EDT     No CSA on file  No UDS on file

## 2022-06-17 DIAGNOSIS — F51.04 PSYCHOPHYSIOLOGICAL INSOMNIA: ICD-10-CM

## 2022-06-17 NOTE — TELEPHONE ENCOUNTER
Rx Refill Note  Requested Prescriptions     Pending Prescriptions Disp Refills   • traZODone (DESYREL) 150 MG tablet 90 tablet 0     Sig: Take 1 tablet by mouth Every Evening.      Last office visit with prescribing clinician: 4/20/2022      Next office visit with prescribing clinician: 7/5/2022            LETICIA CHURCH MA  06/17/22, 16:54 EDT

## 2022-06-18 RX ORDER — TRAZODONE HYDROCHLORIDE 150 MG/1
150 TABLET ORAL EVERY EVENING
Qty: 90 TABLET | Refills: 0 | Status: SHIPPED | OUTPATIENT
Start: 2022-06-18 | End: 2022-12-05 | Stop reason: SDUPTHER

## 2022-06-20 ENCOUNTER — OFFICE VISIT (OUTPATIENT)
Dept: PULMONOLOGY | Facility: CLINIC | Age: 77
End: 2022-06-20

## 2022-06-20 VITALS
DIASTOLIC BLOOD PRESSURE: 64 MMHG | HEIGHT: 65 IN | SYSTOLIC BLOOD PRESSURE: 110 MMHG | BODY MASS INDEX: 26.49 KG/M2 | WEIGHT: 159 LBS | HEART RATE: 96 BPM | OXYGEN SATURATION: 96 %

## 2022-06-20 DIAGNOSIS — Z87.891 HISTORY OF CIGARETTE SMOKING: ICD-10-CM

## 2022-06-20 DIAGNOSIS — R11.0 NAUSEA: ICD-10-CM

## 2022-06-20 DIAGNOSIS — H35.30 MACULAR DEGENERATION OF LEFT EYE, UNSPECIFIED TYPE: ICD-10-CM

## 2022-06-20 DIAGNOSIS — J96.11 CHRONIC RESPIRATORY FAILURE WITH HYPOXIA AND HYPERCAPNIA: ICD-10-CM

## 2022-06-20 DIAGNOSIS — J44.9 COPD, SEVERE: Primary | ICD-10-CM

## 2022-06-20 DIAGNOSIS — J96.12 CHRONIC RESPIRATORY FAILURE WITH HYPOXIA AND HYPERCAPNIA: ICD-10-CM

## 2022-06-20 PROCEDURE — 94664 DEMO&/EVAL PT USE INHALER: CPT | Performed by: NURSE PRACTITIONER

## 2022-06-20 PROCEDURE — 99215 OFFICE O/P EST HI 40 MIN: CPT | Performed by: NURSE PRACTITIONER

## 2022-06-20 RX ORDER — ONDANSETRON 4 MG/1
4 TABLET, FILM COATED ORAL EVERY 8 HOURS PRN
Qty: 90 TABLET | Refills: 0 | Status: SHIPPED | OUTPATIENT
Start: 2022-06-20 | End: 2022-12-14 | Stop reason: SDUPTHER

## 2022-06-20 NOTE — PROGRESS NOTES
Follow Up Office Visit      Patient Name: Gabi Dudley    Chief Complaint:    Chief Complaint   Patient presents with   • Follow-up   • Shortness of Breath       History of Present Illness: Gabi Dudley is a 77 y.o. female who is here today for follow up of COPD.  Since last visit, her PFTs revealed severe obstruction and her ABG revealed chronic hypercapnia. Noninvasive ventilation was subsequently ordered by Dr. Poe. The patient reports that she never heard anything from the [employer] and has never received a machine, however. She is nervous about the use of NIV due to concerns about feeling claustrophobic. [employer] is the supplier of her supplemental oxygen. She notes that she has been unable to afford inhalers secondary to her high deductible. She has been using Breztri though has only been relying on samples. She has currently been without Breztri for about 1 month now. She uses albuterol a couple of times per day. She has had back pain due to a T5 fracture and is now ambulating with a walker. She has established care with ortho and was not a surgical candidate due to the severity of her lung disease. She has also now established with rheumatology, Dr. Domínguez, and has been started on Prolia. She has had chronic nausea since having bowel obstruction surgery in 2020 for which she takes Phenergan and Zofran. She requests a Zofran refill. She also has macular degeneration of the left eye, for which she has required injections.  She requests referral to an ophthalmologist who may be in the [facility] system.    Duration: Asthma since childhood  Severity: Severe dyspnea  Timing: Daily  Context: Mild exertion  Associated Symptoms: No current cough, + intermittent wheezing, no fevers, no hemoptysis, no unintended weight loss  Modifying Factors: Worse with exertion and weather changes.  Improves with rest, oxygen, inhalers.  Supplemental Oxygen: 2L NC  Last Steroids: January 2022  Number of  exacerbations per year: 3-4    Subjective      Review of Systems:  Review of Systems   Constitutional: Negative for fever and unexpected weight change.   Respiratory: Positive for shortness of breath and wheezing. Negative for cough.    Cardiovascular: Negative for leg swelling.   Allergic/Immunologic: Positive for environmental allergies.      Past Medical History:   Past Medical History:   Diagnosis Date   • Adrenal adenoma    • Anemia    • Arrhythmia    • Asthma    • Atrial fibrillation (HCC)    • Back pain    • Benign colonic polyp    • Benign tumor of adrenal gland    • Cataract     bilateral   • Cholelithiasis    • Chronic bronchitis (HCC)    • Chronic bronchitis with COPD (chronic obstructive pulmonary disease) (HCC)    • COPD (chronic obstructive pulmonary disease) (HCC) 2005   • Coronary artery disease    • Depression    • Elevated cholesterol    • Environmental and seasonal allergies    • Fibromyalgia    • Gastritis    • Generalized anxiety disorder    • GERD (gastroesophageal reflux disease)    • H/O mammogram    • Hemorrhoids    • History of blood transfusion 1985   • History of blood transfusion 1985   • History of echocardiogram    • History of endometriosis    • History of nuclear stress test    • Hypertension    • IBS (irritable bowel syndrome)    • Impaired functional mobility, balance, gait, and endurance    • Inverted nipple    • Kidney disease    • Kidney stone    • Liver cyst    • Nodular radiologic density    • Nodule of left lung    • On home oxygen therapy     2 liters NC QHS   • Osteoarthritis    • Osteoporosis    • PONV (postoperative nausea and vomiting)    • Renal cyst    • Sinus problem     2014   • Sinusitis    • Skin cancer     basal cell carcinoma   • SOB (shortness of breath)    • Tobacco use    • Urinary frequency    • Vitamin D deficiency    • Wears glasses    • Wears partial dentures     upper plate       Past Surgical History:   Past Surgical History:   Procedure Laterality Date    • APPENDECTOMY     • CARDIAC CATHETERIZATION     • CATARACT EXTRACTION      both eyes   • CHOLECYSTECTOMY WITH INTRAOPERATIVE CHOLANGIOGRAM N/A 10/30/2020    Procedure: CHOLECYSTECTOMY LAPAROSCOPIC INTRAOPERATIVE CHOLANGIOGRAPHY;  Surgeon: Sima Moses MD;  Location: [facility] OR;  Service: General;  Laterality: N/A;   • COLONOSCOPY  2013   • COLONOSCOPY  2016   • COLONOSCOPY N/A 2019    Procedure: COLONOSCOPY W/ COLD FORCEP POLYPECTOMIES; HOT SNARE POLYPECTOMIES; COLD SNARE POLYPECTOMY;  Surgeon: Goyo Nunez MD;  Location: [facility] ENDOSCOPY;  Service: Gastroenterology   • COLONOSCOPY W/ BIOPSIES AND POLYPECTOMY     • EXPLORATORY LAPAROTOMY N/A 2020    Procedure: colectomy, right, closure of enterotomy x 2, reduction of internal volvulus;  Surgeon: Sima Moses MD;  Location: [facility] OR;  Service: General;  Laterality: N/A;   • HYSTERECTOMY      partial   • LYSIS OF ABDOMINAL ADHESIONS     • TONSILLECTOMY     • UPPER GASTROINTESTINAL ENDOSCOPY  2015   • VAGINAL DELIVERY      x2       Family History:   Family History   Problem Relation Age of Onset   • Stomach cancer Brother    • Colon cancer Maternal Aunt    • Brain cancer Father    • Arthritis Father    • Hypertension Father    • Lung cancer Paternal Grandfather    • Breast cancer Neg Hx    • Ovarian cancer Neg Hx        Social History:   Social History     Socioeconomic History   • Marital status:    Tobacco Use   • Smoking status: Former Smoker     Packs/day: 0.25     Years: 30.00     Pack years: 7.50     Types: Cigarettes     Quit date: 2020     Years since quittin.6   • Smokeless tobacco: Never Used   Vaping Use   • Vaping Use: Never used   Substance and Sexual Activity   • Alcohol use: No   • Drug use: No   • Sexual activity: Defer       Current Medications:     Current Outpatient Medications:   •  albuterol sulfate  (90 Base) MCG/ACT inhaler, INHALE 2 PUFFS BY MOUTH EVERY 4  HOURS AS NEEDED FOR WHEEZING, Disp: 18 g, Rfl: 5  •  apixaban (Eliquis) 5 MG tablet tablet, Take 1 tablet by mouth Every 12 (Twelve) Hours., Disp: 60 tablet, Rfl: 11  •  atorvastatin (LIPITOR) 20 MG tablet, TAKE 1 TABLET BY MOUTH EVERY DAY, Disp: 90 tablet, Rfl: 3  •  Azelastine HCl 137 MCG/SPRAY solution, 1 spray into the nostril(s) as directed by provider 2 (Two) Times a Day., Disp: 30 mL, Rfl: 5  •  benzonatate (Tessalon Perles) 100 MG capsule, Take 1 capsule by mouth 3 (Three) Times a Day As Needed for Cough., Disp: 60 capsule, Rfl: 1  •  Cholecalciferol (vitamin D3) 125 MCG (5000 UT) capsule capsule, Take 5,000 Units by mouth Daily., Disp: , Rfl:   •  clonazePAM (KlonoPIN) 1 MG tablet, Take 1 tablet by mouth Daily As Needed for Anxiety., Disp: 30 tablet, Rfl: 0  •  clotrimazole-betamethasone (Lotrisone) 1-0.05 % cream, Apply  topically to the appropriate area as directed 2 (Two) Times a Day., Disp: 45 g, Rfl: 1  •  DULoxetine (CYMBALTA) 60 MG capsule, TAKE ONE CAPSULE BY MOUTH TWICE DAILY, Disp: 180 capsule, Rfl: 3  •  HYDROcodone-acetaminophen (NORCO)  MG per tablet, Take 1 tablet by mouth 4 (Four) Times a Day As Needed., Disp: , Rfl:   •  ipratropium-albuterol (DUO-NEB) 0.5-2.5 mg/3 ml nebulizer, USE 3 ML VIA NEBULIZER EVERY 4 HOURS AS NEEDED FOR WHEEZING, Disp: 360 mL, Rfl: 11  •  loperamide (IMODIUM) 2 MG capsule, Take 1 capsule by mouth 4 (Four) Times a Day As Needed for Diarrhea., Disp: 20 capsule, Rfl: 0  •  O2 (OXYGEN), Inhale 2 L/min As Needed., Disp: , Rfl:   •  ondansetron (ZOFRAN) 4 MG tablet, Take 1 tablet by mouth Every 8 (Eight) Hours As Needed for Nausea or Vomiting., Disp: 9 tablet, Rfl: 0  •  oxybutynin XL (DITROPAN-XL) 10 MG 24 hr tablet, Take 1 tablet by mouth Daily., Disp: 90 tablet, Rfl: 3  •  pantoprazole (PROTONIX) 40 MG EC tablet, TAKE 1 TABLET BY MOUTH DAILY, Disp: 90 tablet, Rfl: 3  •  promethazine (PHENERGAN) 25 MG tablet, Take 1 tablet by mouth Every 8 (Eight) Hours As Needed  "for Nausea or Vomiting., Disp: 30 tablet, Rfl: 1  •  sertraline (ZOLOFT) 100 MG tablet, TAKE 1 & 1/2 TABLET BY MOUTH DAILY, Disp: 135 tablet, Rfl: 3  •  Sodium Fluoride 1.1 % gel, USE AS DIRECTED ON LABEL, Disp: , Rfl:   •  traZODone (DESYREL) 150 MG tablet, Take 1 tablet by mouth Every Evening., Disp: 90 tablet, Rfl: 0     Allergies:   Allergies   Allergen Reactions   • Doxycycline GI Intolerance       Objective     Physical Exam:  Vital Signs:   Vitals:    06/20/22 1514 06/20/22 1515   BP: 110/64    Pulse: 96    SpO2: 91%  Comment: room air 96%  Comment: on 2lpm   Weight: 72.1 kg (159 lb)    Height: 165.1 cm (65\")      Body mass index is 26.46 kg/m².    Physical Exam  Constitutional:       General: She is not in acute distress.     Appearance: She is not toxic-appearing.      Comments: Wearing supplemental oxygen   HENT:      Head: Normocephalic and atraumatic.   Eyes:      Extraocular Movements: Extraocular movements intact.   Cardiovascular:      Rate and Rhythm: Normal rate and regular rhythm.      Heart sounds: Normal heart sounds.   Pulmonary:      Effort: Pulmonary effort is normal.      Comments: Diminished throughout with faint scattered wheezing  Abdominal:      General: There is no distension.   Musculoskeletal:         General: No swelling.      Cervical back: Neck supple.      Comments: Deconditioned, ambulates with a rolling walker   Skin:     General: Skin is warm and dry.      Findings: No rash.   Neurological:      General: No focal deficit present.      Mental Status: She is alert and oriented to person, place, and time.   Psychiatric:         Mood and Affect: Mood normal.         Behavior: Behavior normal.       Results Review:   Alpha - 1 - Antitrypsin Phenotype (Order #[id number]) on 3/17/22    Site   Date Value Ref Range Status   03/17/2022 Right Radial  Final     Glenn's Test   Date Value Ref Range Status   03/17/2022 Positive  Final     pH, Arterial   Date Value Ref Range Status   03/17/2022 " 7.393 7.350 - 7.450 pH units Final     pCO2, Arterial   Date Value Ref Range Status   03/17/2022 59.8 (C) 35.0 - 45.0 mm Hg Final     Comment:     83 Value above reference range     pO2, Arterial   Date Value Ref Range Status   03/17/2022 51.7 (L) 75.0 - 100.0 mm Hg Final     Comment:     85 Value below critical limit     HCO3, Arterial   Date Value Ref Range Status   03/17/2022 36.4 (H) 22.0 - 28.0 mmol/L Final     Comment:     83 Value above reference range     Base Excess, Arterial   Date Value Ref Range Status   03/17/2022 9.5 (H) 0.0 - 2.0 mmol/L Final     Comment:     83 Value above reference range     O2 Saturation, Arterial   Date Value Ref Range Status   03/17/2022 88.0 (L) 94.0 - 100.0 % Final     Comment:     84 Value below reference range     Hematocrit, Blood Gas   Date Value Ref Range Status   03/17/2022 36.4 % Final     Comment:     84 Value below reference range     Oxyhemoglobin   Date Value Ref Range Status   03/17/2022 86.9 (L) 94 - 99 % Final     Comment:     84 Value below reference range     Methemoglobin   Date Value Ref Range Status   03/17/2022 0.50 0.00 - 1.50 % Final     Carboxyhemoglobin   Date Value Ref Range Status   03/17/2022 0.8 0 - 2 % Final     Barometric Pressure for Blood Gas   Date Value Ref Range Status   03/17/2022 732 mmHg Final     Modality   Date Value Ref Range Status   03/17/2022 Nasal Cannula  Final     FIO2   Date Value Ref Range Status   03/17/2022 28 % Final     WBC   Date Value Ref Range Status   05/06/2022 6.42 3.40 - 10.80 10*3/mm3 Final     RBC   Date Value Ref Range Status   05/06/2022 4.71 3.77 - 5.28 10*6/mm3 Final     Hemoglobin   Date Value Ref Range Status   05/06/2022 12.3 12.0 - 15.9 g/dL Final   12/14/2021 15.0 12.0 - 15.9 g/dL Final     Hematocrit   Date Value Ref Range Status   05/06/2022 39.4 34.0 - 46.6 % Final   12/14/2021 47.3 (H) 34.0 - 46.6 % Final     MCV   Date Value Ref Range Status   05/06/2022 83.7 79.0 - 97.0 fL Final   12/14/2021 90.6 79.0  - 97.0 fL Final     MCH   Date Value Ref Range Status   05/06/2022 26.1 (L) 26.6 - 33.0 pg Final   12/14/2021 28.7 26.6 - 33.0 pg Final     MCHC   Date Value Ref Range Status   05/06/2022 31.2 (L) 31.5 - 35.7 g/dL Final   12/14/2021 31.7 31.5 - 35.7 g/dL Final     RDW   Date Value Ref Range Status   05/06/2022 13.6 12.3 - 15.4 % Final   12/14/2021 14.6 12.3 - 15.4 % Final     RDW-SD   Date Value Ref Range Status   12/14/2021 48.1 37.0 - 54.0 fl Final     MPV   Date Value Ref Range Status   12/14/2021 10.0 6.0 - 12.0 fL Final     Platelets   Date Value Ref Range Status   05/06/2022 296 140 - 450 10*3/mm3 Final   12/14/2021 271 140 - 450 10*3/mm3 Final     Neutrophil Rel %   Date Value Ref Range Status   05/06/2022 75.3 42.7 - 76.0 % Final     Neutrophil %   Date Value Ref Range Status   12/14/2021 84.0 (H) 42.7 - 76.0 % Final     Lymphocyte Rel %   Date Value Ref Range Status   05/06/2022 19.5 (L) 19.6 - 45.3 % Final     Lymphocyte %   Date Value Ref Range Status   12/14/2021 9.6 (L) 19.6 - 45.3 % Final     Monocyte Rel %   Date Value Ref Range Status   05/06/2022 4.4 (L) 5.0 - 12.0 % Final     Monocyte %   Date Value Ref Range Status   12/14/2021 5.6 5.0 - 12.0 % Final     Eosinophil Rel %   Date Value Ref Range Status   05/06/2022 0.0 (L) 0.3 - 6.2 % Final     Eosinophil %   Date Value Ref Range Status   12/14/2021 0.1 (L) 0.3 - 6.2 % Final     Basophil Rel %   Date Value Ref Range Status   05/06/2022 0.5 0.0 - 1.5 % Final     Basophil %   Date Value Ref Range Status   12/14/2021 0.2 0.0 - 1.5 % Final     Immature Grans %   Date Value Ref Range Status   12/14/2021 0.5 0.0 - 0.5 % Final     Neutrophils Absolute   Date Value Ref Range Status   05/06/2022 4.84 1.70 - 7.00 10*3/mm3 Final     Neutrophils, Absolute   Date Value Ref Range Status   12/14/2021 10.20 (H) 1.70 - 7.00 10*3/mm3 Final     Lymphocytes Absolute   Date Value Ref Range Status   05/06/2022 1.25 0.70 - 3.10 10*3/mm3 Final     Lymphocytes, Absolute    Date Value Ref Range Status   12/14/2021 1.17 0.70 - 3.10 10*3/mm3 Final     Monocytes Absolute   Date Value Ref Range Status   05/06/2022 0.28 0.10 - 0.90 10*3/mm3 Final     Monocytes, Absolute   Date Value Ref Range Status   12/14/2021 0.68 0.10 - 0.90 10*3/mm3 Final     Eosinophils Absolute   Date Value Ref Range Status   05/06/2022 0.00 0.00 - 0.40 10*3/mm3 Final     Eosinophils, Absolute   Date Value Ref Range Status   12/14/2021 0.01 0.00 - 0.40 10*3/mm3 Final     Basophils Absolute   Date Value Ref Range Status   05/06/2022 0.03 0.00 - 0.20 10*3/mm3 Final     Basophils, Absolute   Date Value Ref Range Status   12/14/2021 0.03 0.00 - 0.20 10*3/mm3 Final     Immature Grans, Absolute   Date Value Ref Range Status   12/14/2021 0.06 (H) 0.00 - 0.05 10*3/mm3 Final     nRBC   Date Value Ref Range Status   05/06/2022 0.0 0.0 - 0.2 /100 WBC Final   12/14/2021 0.0 0.0 - 0.2 /100 WBC Final     Lab Results   Component Value Date    GLUCOSE 127 (H) 05/06/2022    BUN 15 05/06/2022    CREATININE 0.66 05/06/2022    EGFRIFNONA 98 12/14/2021    EGFRIFAFRI 80 11/16/2021    BCR 22.7 05/06/2022    K 4.6 05/06/2022    CO2 27.6 05/06/2022    CALCIUM 9.0 05/06/2022    PROTENTOTREF 7.1 05/06/2022    ALBUMIN 4.30 05/06/2022    LABIL2 1.5 05/06/2022    AST 17 05/06/2022    ALT 11 05/06/2022     Results for orders placed during the hospital encounter of 06/15/20    Adult Transthoracic Echo Complete W/ Cont if Necessary Per Protocol    Interpretation Summary  1.  Normal left ventricular size with low normal LV systolic function, LVEF 50-55%.  2.  Mild concentric LVH.  3.  Normal LV diastolic filling pattern.  4.  Mild right ventricular dilation with normal RV systolic function.  5.  Normal left and right atrial size.  6.  Trace MR and TR.    May 2022 PFTs showed severe obstruction with significant bronchodilator response.  Postbronchodilator FEV1 of 31%.  Air trapping noted.    Assessment / Plan      Assessment/Plan:   Diagnoses and  all orders for this visit:    1. COPD, severe (HCC) (Primary)  -     Fluticasone-Umeclidin-Vilant (Trelegy Ellipta) 200-62.5-25 MCG/INH inhaler; Inhale 1 puff Daily for 30 days. Rinse mouth out after use  Dispense: 1 each; Refill: 5  -     Ambulatory Referral to Disease State Management  Patient with severe COPD per review of recent PFTs which show FEV1 of 31%.  Normal alpha-1 antitrypsin phenotype.  Will not continue use of Breztri, which she has been out of for approximately 1 month now.  She reports a longstanding history of asthma.  She will be started on high-dose Trelegy and referred to the medication management clinic for patient assistance as she has difficulty affording inhalers.We discussed the risk and benefits of inhaled corticosteroids. Patient instructed to take them on a regular basis as prescribed. Patient instructed to rinse their mouth out after each use. Appropriate inhaler technique demonstrated today in clinic.     2. Chronic respiratory failure with hypoxia and hypercapnia (HCC)  Continues on supplemental oxygen.  Patient with severe COPD and frequent exacerbations.  Chronic hypercapnia noted per review of recent ABG.  Noninvasive ventilation has been ordered previously, patient has not yet been contacted about receiving a machine.  Our office respiratory therapist has been asked to follow-up on this order to ensure that patient receives the ventilator.  Patient was counseled on the importance of compliance with NIV use. The patient was counseled on the need to use sedating medications cautiously, as this can worsen their respiratory status.     3. Macular degeneration of left eye, unspecified type  -     Ambulatory Referral to Ophthalmology    4. Nausea  -     ondansetron (ZOFRAN) 4 MG tablet; Take 1 tablet by mouth Every 8 (Eight) Hours As Needed for Nausea or Vomiting.  Dispense: 90 tablet; Refill: 0    5. History of cigarette smoking  Patient will continue with annual lung cancer screening  scans via her PCP.    Follow Up:   Keep pending November 2022 pulmonary clinic appointment  The patient was counseled on diagnostic results, risks and benefits of treatment options, risk factor modifications and the importance of treatment compliance. The patient was advised to contact the clinic with concerns or worsening symptoms.     REGINA Mason   Pulmonary Medicine Ted     Greater than 60 minutes was spent in the care of this patient today.  Please note that portions of this note may have been completed with a voice recognition program. Efforts were made to edit the dictations, but occasionally words are mistranscribed

## 2022-06-29 ENCOUNTER — TELEPHONE (OUTPATIENT)
Dept: CARDIOLOGY | Facility: CLINIC | Age: 77
End: 2022-06-29

## 2022-06-29 DIAGNOSIS — I48.0 PAROXYSMAL ATRIAL FIBRILLATION: ICD-10-CM

## 2022-06-29 NOTE — TELEPHONE ENCOUNTER
Caller: Malu Aguilar    Relationship: DAUGHTER    Best call back number: [phone]    Requested Prescriptions:   Requested Prescriptions      No prescriptions requested or ordered in this encounter       Additional details provided by patient: PT HAS BEEN GETTING SAMPLES OF ELIQUIS 5MG DUE TO COST - PT HAS 1 WEEK LEFT AND WAS WONDERING IF SHE COULD GET MORE - PTS DAUGHTER WOULD LIKE A CALL BACK TO CONFIRM    Does the patient have less than a 3 day supply:  [] Yes  [x] No    Rafa Abad Rep   06/29/22 10:34 EDT

## 2022-07-12 DIAGNOSIS — F41.9 ANXIETY: ICD-10-CM

## 2022-07-12 RX ORDER — CLONAZEPAM 1 MG/1
1 TABLET ORAL DAILY PRN
Qty: 30 TABLET | Refills: 0 | Status: SHIPPED | OUTPATIENT
Start: 2022-07-12 | End: 2022-08-24

## 2022-07-12 NOTE — TELEPHONE ENCOUNTER
Rx Refill Note  Requested Prescriptions     Pending Prescriptions Disp Refills   • clonazePAM (KlonoPIN) 1 MG tablet [Pharmacy Med Name: clonazePAM 1MG TABS*] 30 tablet      Sig: TAKE 1 TABLET BY MOUTH DAILY AS NEEDED FOR ANXIETY.      Last office visit with prescribing clinician: 4/20/2022      Next office visit with prescribing clinician: 8/4/2022            Jackie Leone MA  07/12/22, 16:17 EDT       No controlled substance agreement on file  No UDS in chart

## 2022-07-27 ENCOUNTER — TELEPHONE (OUTPATIENT)
Dept: CARDIOLOGY | Facility: CLINIC | Age: 77
End: 2022-07-27

## 2022-07-27 ENCOUNTER — TELEMEDICINE (OUTPATIENT)
Dept: CARDIOLOGY | Facility: CLINIC | Age: 77
End: 2022-07-27

## 2022-07-27 DIAGNOSIS — I48.0 PAROXYSMAL ATRIAL FIBRILLATION: ICD-10-CM

## 2022-07-27 DIAGNOSIS — I48.0 PAROXYSMAL ATRIAL FIBRILLATION: Primary | ICD-10-CM

## 2022-07-27 DIAGNOSIS — I25.10 CORONARY ARTERY DISEASE INVOLVING NATIVE CORONARY ARTERY OF NATIVE HEART WITHOUT ANGINA PECTORIS: ICD-10-CM

## 2022-07-27 PROCEDURE — 99213 OFFICE O/P EST LOW 20 MIN: CPT | Performed by: INTERNAL MEDICINE

## 2022-07-27 NOTE — TELEPHONE ENCOUNTER
Caller: ALL NAIK     Relationship: DAUGHTER    Best call back number: [phone]    What is the best time to reach you:ANY    Who are you requesting to speak with (clinical staff, provider,  specific staff member): CLINICAL    What was the call regarding: PT NEEDS A ELIQUIS, SHE HAS A WEEK SUPPLY REMAINING  Do you require a callback: YES

## 2022-07-28 ENCOUNTER — HOSPITAL ENCOUNTER (OUTPATIENT)
Dept: GENERAL RADIOLOGY | Facility: HOSPITAL | Age: 77
Discharge: HOME | End: 2022-07-28
Admitting: NURSE PRACTITIONER

## 2022-07-28 ENCOUNTER — OFFICE VISIT (OUTPATIENT)
Dept: PULMONOLOGY | Facility: CLINIC | Age: 77
End: 2022-07-28

## 2022-07-28 VITALS
DIASTOLIC BLOOD PRESSURE: 82 MMHG | HEIGHT: 65 IN | OXYGEN SATURATION: 86 % | RESPIRATION RATE: 18 BRPM | BODY MASS INDEX: 26.99 KG/M2 | WEIGHT: 162 LBS | HEART RATE: 108 BPM | SYSTOLIC BLOOD PRESSURE: 126 MMHG

## 2022-07-28 DIAGNOSIS — R07.89 CHEST PAIN, ATYPICAL: ICD-10-CM

## 2022-07-28 DIAGNOSIS — J44.9 COPD, SEVERE: ICD-10-CM

## 2022-07-28 DIAGNOSIS — R06.02 SOB (SHORTNESS OF BREATH): ICD-10-CM

## 2022-07-28 DIAGNOSIS — R06.02 SOB (SHORTNESS OF BREATH): Primary | ICD-10-CM

## 2022-07-28 DIAGNOSIS — J96.11 CHRONIC RESPIRATORY FAILURE WITH HYPOXIA AND HYPERCAPNIA: ICD-10-CM

## 2022-07-28 DIAGNOSIS — R09.02 HYPOXIA: ICD-10-CM

## 2022-07-28 DIAGNOSIS — J96.12 CHRONIC RESPIRATORY FAILURE WITH HYPOXIA AND HYPERCAPNIA: ICD-10-CM

## 2022-07-28 PROCEDURE — 99214 OFFICE O/P EST MOD 30 MIN: CPT | Performed by: NURSE PRACTITIONER

## 2022-07-28 PROCEDURE — 71046 X-RAY EXAM CHEST 2 VIEWS: CPT

## 2022-08-04 ENCOUNTER — OFFICE VISIT (OUTPATIENT)
Dept: INTERNAL MEDICINE | Facility: CLINIC | Age: 77
End: 2022-08-04

## 2022-08-04 VITALS
BODY MASS INDEX: 27.32 KG/M2 | WEIGHT: 164 LBS | TEMPERATURE: 96.7 F | HEART RATE: 99 BPM | OXYGEN SATURATION: 96 % | DIASTOLIC BLOOD PRESSURE: 80 MMHG | HEIGHT: 65 IN | SYSTOLIC BLOOD PRESSURE: 120 MMHG

## 2022-08-04 DIAGNOSIS — I48.0 PAROXYSMAL ATRIAL FIBRILLATION: Primary | ICD-10-CM

## 2022-08-04 DIAGNOSIS — N39.41 URGE INCONTINENCE: ICD-10-CM

## 2022-08-04 DIAGNOSIS — D35.00 ADRENAL ADENOMA, UNSPECIFIED LATERALITY: ICD-10-CM

## 2022-08-04 DIAGNOSIS — M79.7 FIBROMYALGIA: ICD-10-CM

## 2022-08-04 DIAGNOSIS — F41.9 ANXIETY: ICD-10-CM

## 2022-08-04 DIAGNOSIS — R91.1 LUNG NODULE: ICD-10-CM

## 2022-08-04 DIAGNOSIS — G44.209 TENSION HEADACHE: ICD-10-CM

## 2022-08-04 DIAGNOSIS — E78.00 PURE HYPERCHOLESTEROLEMIA: ICD-10-CM

## 2022-08-04 DIAGNOSIS — L08.1 ERYTHRASMA: ICD-10-CM

## 2022-08-04 DIAGNOSIS — I10 PRIMARY HYPERTENSION: ICD-10-CM

## 2022-08-04 DIAGNOSIS — I25.10 CORONARY ARTERY DISEASE INVOLVING NATIVE CORONARY ARTERY OF NATIVE HEART WITHOUT ANGINA PECTORIS: ICD-10-CM

## 2022-08-04 DIAGNOSIS — E55.9 VITAMIN D DEFICIENCY: ICD-10-CM

## 2022-08-04 DIAGNOSIS — R53.82 CHRONIC FATIGUE: ICD-10-CM

## 2022-08-04 DIAGNOSIS — G47.00 INSOMNIA, UNSPECIFIED TYPE: ICD-10-CM

## 2022-08-04 DIAGNOSIS — M17.9 OSTEOARTHRITIS OF KNEE, UNSPECIFIED LATERALITY, UNSPECIFIED OSTEOARTHRITIS TYPE: ICD-10-CM

## 2022-08-04 DIAGNOSIS — K21.9 GASTROESOPHAGEAL REFLUX DISEASE WITHOUT ESOPHAGITIS: ICD-10-CM

## 2022-08-04 DIAGNOSIS — J43.9 PULMONARY EMPHYSEMA, UNSPECIFIED EMPHYSEMA TYPE: ICD-10-CM

## 2022-08-04 DIAGNOSIS — M81.0 AGE-RELATED OSTEOPOROSIS WITHOUT CURRENT PATHOLOGICAL FRACTURE: ICD-10-CM

## 2022-08-04 PROBLEM — R04.0 EPISTAXIS: Status: RESOLVED | Noted: 2022-04-20 | Resolved: 2022-08-04

## 2022-08-04 PROBLEM — R11.0 NAUSEA: Status: RESOLVED | Noted: 2021-11-17 | Resolved: 2022-08-04

## 2022-08-04 PROBLEM — J42 CHRONIC BRONCHITIS: Status: RESOLVED | Noted: 2021-12-23 | Resolved: 2022-08-04

## 2022-08-04 PROBLEM — K80.20 CALCULUS OF GALLBLADDER WITHOUT CHOLECYSTITIS WITHOUT OBSTRUCTION: Status: RESOLVED | Noted: 2020-10-16 | Resolved: 2022-08-04

## 2022-08-04 PROCEDURE — 1170F FXNL STATUS ASSESSED: CPT | Performed by: INTERNAL MEDICINE

## 2022-08-04 PROCEDURE — G0439 PPPS, SUBSEQ VISIT: HCPCS | Performed by: INTERNAL MEDICINE

## 2022-08-04 PROCEDURE — 1159F MED LIST DOCD IN RCRD: CPT | Performed by: INTERNAL MEDICINE

## 2022-08-04 PROCEDURE — 1125F AMNT PAIN NOTED PAIN PRSNT: CPT | Performed by: INTERNAL MEDICINE

## 2022-08-04 RX ORDER — CETIRIZINE HYDROCHLORIDE 10 MG/1
10 TABLET ORAL DAILY
Qty: 30 TABLET | Refills: 2 | Status: SHIPPED | OUTPATIENT
Start: 2022-08-04

## 2022-08-04 NOTE — PROGRESS NOTES
The ABCs of the Annual Wellness Visit  Subsequent Medicare Wellness Visit    Chief Complaint   Patient presents with   • Medicare Wellness-subsequent      Subjective    History of Present Illness:  Gabi Dudley is a 77 y.o. female who presents for a Subsequent Medicare Wellness Visit.    The following portions of the patient's history were reviewed and   updated as appropriate: allergies, current medications, past family history, past medical history, past social history, past surgical history and problem list.    Compared to one year ago, the patient feels her physical   health is the same.    Compared to one year ago, the patient feels her mental   health is the same.    Recent Hospitalizations:  She was not admitted to the hospital during the last year.       Current Medical Providers:  Patient Care Team:  Paul Quinteros MD as PCP - General (Internal Medicine)  Sima Moses MD as Consulting Physician (General Surgery)  Taiwo Curry MD as Consulting Physician (Cardiology)    Outpatient Medications Prior to Visit   Medication Sig Dispense Refill   • albuterol sulfate  (90 Base) MCG/ACT inhaler INHALE 2 PUFFS BY MOUTH EVERY 4 HOURS AS NEEDED FOR WHEEZING 18 g 5   • apixaban (Eliquis) 5 MG tablet tablet Take 1 tablet by mouth Every 12 (Twelve) Hours. 60 tablet 11   • atorvastatin (LIPITOR) 20 MG tablet TAKE 1 TABLET BY MOUTH EVERY DAY 90 tablet 3   • Azelastine HCl 137 MCG/SPRAY solution 1 spray into the nostril(s) as directed by provider 2 (Two) Times a Day. 30 mL 5   • benzonatate (Tessalon Perles) 100 MG capsule Take 1 capsule by mouth 3 (Three) Times a Day As Needed for Cough. 60 capsule 1   • Cholecalciferol (vitamin D3) 125 MCG (5000 UT) capsule capsule Take 5,000 Units by mouth Daily.     • clonazePAM (KlonoPIN) 1 MG tablet TAKE 1 TABLET BY MOUTH DAILY AS NEEDED FOR ANXIETY. 30 tablet 0   • clotrimazole-betamethasone (Lotrisone) 1-0.05 % cream Apply  topically to the appropriate area as  directed 2 (Two) Times a Day. 45 g 1   • DULoxetine (CYMBALTA) 60 MG capsule TAKE ONE CAPSULE BY MOUTH TWICE DAILY 180 capsule 3   • HYDROcodone-acetaminophen (NORCO)  MG per tablet Take 1 tablet by mouth 4 (Four) Times a Day As Needed.     • ipratropium-albuterol (DUO-NEB) 0.5-2.5 mg/3 ml nebulizer USE 3 ML VIA NEBULIZER EVERY 4 HOURS AS NEEDED FOR WHEEZING 360 mL 11   • loperamide (IMODIUM) 2 MG capsule Take 1 capsule by mouth 4 (Four) Times a Day As Needed for Diarrhea. 20 capsule 0   • O2 (OXYGEN) Inhale 2 L/min As Needed.     • ondansetron (ZOFRAN) 4 MG tablet Take 1 tablet by mouth Every 8 (Eight) Hours As Needed for Nausea or Vomiting. 90 tablet 0   • pantoprazole (PROTONIX) 40 MG EC tablet TAKE 1 TABLET BY MOUTH DAILY 90 tablet 3   • promethazine (PHENERGAN) 25 MG tablet Take 1 tablet by mouth Every 8 (Eight) Hours As Needed for Nausea or Vomiting. 30 tablet 1   • sertraline (ZOLOFT) 100 MG tablet TAKE 1 & 1/2 TABLET BY MOUTH DAILY 135 tablet 3   • Sodium Fluoride 1.1 % gel USE AS DIRECTED ON LABEL     • traZODone (DESYREL) 150 MG tablet Take 1 tablet by mouth Every Evening. 90 tablet 0   • oxybutynin XL (DITROPAN-XL) 10 MG 24 hr tablet Take 1 tablet by mouth Daily. 90 tablet 3     No facility-administered medications prior to visit.       Opioid medication/s are on active medication list.  and I have evaluated her active treatment plan and pain score trends (see table).  Vitals:    08/04/22 1549   PainSc:   5     I have reviewed the chart for potential of high risk medication and harmful drug interactions in the elderly.            Aspirin is not on active medication list.  Aspirin use is not indicated based on review of current medical condition/s. Risk of harm outweighs potential benefits.  .    Patient Active Problem List   Diagnosis   • Adrenal adenoma   • Anxiety   • Chronic fatigue   • Fibromyalgia   • Hyperlipidemia   • Hypertension   • Insomnia   • Osteoarthritis of knee   • Osteoporosis   •  "Pulmonary emphysema (HCC)   • Simple renal cyst   • Tension headache   • Vitamin D deficiency   • Diverticulosis   • Inversion of nipple   • Paroxysmal atrial fibrillation (HCC)   • Coronary artery disease involving native coronary artery of native heart without angina pectoris   • Autonomic dysfunction   • Gastroesophageal reflux disease without esophagitis   • Urge incontinence   • Calculus of gallbladder without cholecystitis without obstruction   • Erythrasma   • Compression fracture of T5 vertebra (HCC)   • Lung nodule     Advance Care Planning  Advance Directive is not on file.  ACP discussion was held with the patient during this visit. Patient does not have an advance directive, information provided.          Objective    Vitals:    22 1549   BP: 120/80   Pulse: 99   Temp: 96.7 °F (35.9 °C)   SpO2: 96%  Comment: 2 liters of O2   Weight: 74.4 kg (164 lb)   Height: 165.1 cm (65\")   PainSc:   5     Estimated body mass index is 27.29 kg/m² as calculated from the following:    Height as of this encounter: 165.1 cm (65\").    Weight as of this encounter: 74.4 kg (164 lb).    BMI is >= 25 and <30. (Overweight) The following options were offered after discussion;: exercise counseling/recommendations      Does the patient have evidence of cognitive impairment? No    Physical Exam            HEALTH RISK ASSESSMENT    Smoking Status:  Social History     Tobacco Use   Smoking Status Former Smoker   • Packs/day: 0.25   • Years: 30.00   • Pack years: 7.50   • Types: Cigarettes   • Quit date: 2020   • Years since quittin.7   Smokeless Tobacco Never Used     Alcohol Consumption:  Social History     Substance and Sexual Activity   Alcohol Use No     Fall Risk Screen:    STEADI Fall Risk Assessment was completed, and patient is at MODERATE risk for falls. Assessment completed on:2022    Depression Screening:  PHQ-2/PHQ-9 Depression Screening 2022   Retired PHQ-9 Total Score -   Retired Total Score - "   Little Interest or Pleasure in Doing Things 0-->not at all   Feeling Down, Depressed or Hopeless 1-->several days   PHQ-9: Brief Depression Severity Measure Score 1       Health Habits and Functional and Cognitive Screening:  Functional & Cognitive Status 8/4/2022   Do you have difficulty preparing food and eating? No   Do you have difficulty bathing yourself, getting dressed or grooming yourself? Yes   Do you have difficulty using the toilet? No   Do you have difficulty moving around from place to place? Yes   Do you have trouble with steps or getting out of a bed or a chair? Yes   Current Diet Unhealthy Diet   Dental Exam Up to date        Dental Exam Comment 2022   Eye Exam Up to date        Eye Exam Comment 04/2022   Exercise (times per week) 0 times per week   Current Exercises Include No Regular Exercise   Current Exercise Activities Include -   Do you need help using the phone?  No   Are you deaf or do you have serious difficulty hearing?  Yes   Do you need help with transportation? No   Do you need help shopping? Yes   Do you need help preparing meals?  Yes   Do you need help with housework?  Yes   Do you need help with laundry? Yes   Do you need help taking your medications? Yes   Do you need help managing money? No   Do you ever drive or ride in a car without wearing a seat belt? No   Have you felt unusual stress, anger or loneliness in the last month? Yes   Who do you live with? Child   If you need help, do you have trouble finding someone available to you? No   Have you been bothered in the last four weeks by sexual problems? No   Do you have difficulty concentrating, remembering or making decisions? Yes       Age-appropriate Screening Schedule:  Refer to the list below for future screening recommendations based on patient's age, sex and/or medical conditions. Orders for these recommended tests are listed in the plan section. The patient has been provided with a written plan.    Health Maintenance    Topic Date Due   • MAMMOGRAM  07/03/2021   • ZOSTER VACCINE (2 of 3) 08/05/2026 (Originally 6/10/2016)   • TDAP/TD VACCINES (3 - Td or Tdap) 08/11/2027 (Originally 7/25/2020)   • INFLUENZA VACCINE  10/01/2022   • LIPID PANEL  05/06/2023   • DXA SCAN  Discontinued              Assessment & Plan   CMS Preventative Services Quick Reference  Risk Factors Identified During Encounter  Inactivity/Sedentary  The above risks/problems have been discussed with the patient.  Follow up actions/plans if indicated are seen below in the Assessment/Plan Section.  Pertinent information has been shared with the patient in the After Visit Summary.    Diagnoses and all orders for this visit:    1. Paroxysmal atrial fibrillation (HCC) (Primary) follow cardio    2. Primary hypertension cont med    3. Pure hypercholesterolemia cont med    4. Coronary artery disease involving native coronary artery of native heart without angina pectoris    5. Vitamin D deficiency start vit D3 2000iu daily     6. Gastroesophageal reflux disease without esophagitis cont med    7. Allergy sinus congestion, start zyrtec and flonase     8. Urge incontinence self d/c'd med due to dry mouth     9. Adrenal adenoma, follow uro     10. Anxiety cont med    11. Age-related osteoporosis without current pathological fracture, cont prolia from Rheum    12. Osteoarthritis of knee, unspecified laterality, unspecified osteoarthritis type    13. Fibromyalgia    14. Tension headache    15. Pulmonary emphysema, unspecified emphysema type (HCC)    16. Lung nodule repeat 3/2023    17. Insomnia, unspecified type    18. Erythrasma cont med    19. Chronic fatigue    Patient needs portable concentrator  patient needs handicap parking      Follow Up:   No follow-ups on file.     An After Visit Summary and PPPS were made available to the patient.                    ----historical data below  S/p cholecystectomy   tob, quit already. CT lung 8/2020, lung nodule, scarring and  emphysema.   anxiety continue zoloft 150 mg daily.   and klonopin 1mg, 1/2 tab po prn , again emphasized as needed basis.  Continue cymbalta 60 bid.  Patient refused to take BuSpar or see psychiatrist.  CT scan showed liver cyst, large kidney cyst, adrenal gland adenoma, kidney stones non-obstructing. repeat showed no change 7/2020,benign per radiologist  right knee OA on xray aleve prn knee brace help  hip pain s/p steroid helped improved  Osteoporosis continue medication, refuse dexa   heme+, Colon polyps repeat 6/2019. EGD done  COPD continue medication. need to Quit tobacco. tudorsa, proair, continue O2 prn, symbicort refill nebulizer  Fibromyalgia stable watch continue medicine and follow up with pain clinic,with lortab  Tension HA continue flexeril and naproxen  sinus polyp per dentist, obtain rec.  ref to ENT.patient refuses, azelastine and Claritin D 5 mg in the morning  Nipple retraction mamm and US:negative pathology  Cysts in liver and kidney, watch  Kidney lesion renal image CT renal protocol cyst and stone non obstructing  A.fib and pericardia eff, follow cardio patient to schedule  Liver cyst on CT watch  Right neck SK watch for now, patient to call if increase in size or change in color  Colon 7/24/19, repeat in 3 years, patient refuses to repeat 8/4/22  CT scan showed diverticulosis

## 2022-08-09 NOTE — PROGRESS NOTES
[facility] Follow Up Note    Gabi Dudley  [id number]  2022    Primary Care Provider: Paul Quinteros MD    Chief Complaint: Routine follow-up    History of Present Illness:   Mrs. Gabi Dudley is a 76 y.o. female who is being seen by telemedicine video visit for routine follow-up.  The patient has a past medical history significant for hypertension, dyslipidemia, prior pulmonary embolism, fibromyalgia, anxiety, and chronic tobacco use complicated by COPD.  Her past cardiac history is significant for atrial fibrillation and nonobstructive coronary artery disease diagnosed on remote coronary angiogram in approximately .  Today, the patient reports she is doing well from a cardiac standpoint.  She has not had any significant recurrent episodes of palpitations.  She continues tolerate Eliquis without significant bleeding or bruising.  No chest pain or exertional chest discomfort.  No specific complaints today.     Past Cardiac Testin. Last Coronary Angio:  , reportedly nonobstructive disease.  Report unavailable  2. Prior Stress Testing: Remote, with report unavailable  3. Last Echo: 2020              1.  Normal left ventricular size with low normal LV systolic function, LVEF 50-55%.              2.  Mild concentric LVH.              3.  Normal LV diastolic filling pattern.              4.  Mild right ventricular dilation with normal RV systolic function.              5.  Normal left and right atrial size.              6.  Trace MR and TR.  4. Prior Holter Monitor: 48-hour Holter 2020              1.  The predominant rhythm is sinus rhythm with an average heart rate 90 bpm.              2.  Normal atrioventricular conduction.              3.  No sustained supraventricular or ventricular arrhythmias.              4.  No episodes of paroxysmal atrial fibrillation.              5.  Rare supraventricular ectopy with isolated PACs, burden  "<0.1%.              6.  Rare ventricular ectopy with isolated and pairs of PVCs, burden <0.1%.              7.  One symptomatic episode of \"anxiety, chest pain, shortness of air\" corresponded to NSR at 91 bpm.    Review of Systems:   Review of Systems   Constitutional: Negative for activity change, appetite change, chills, diaphoresis, fatigue, fever, unexpected weight gain and unexpected weight loss.   Eyes: Negative for blurred vision and double vision.   Respiratory: Negative for cough, chest tightness, shortness of breath and wheezing.    Cardiovascular: Negative for chest pain, palpitations and leg swelling.   Gastrointestinal: Negative for abdominal pain, anal bleeding, blood in stool and GERD.   Endocrine: Negative for cold intolerance and heat intolerance.   Genitourinary: Negative for hematuria.   Neurological: Negative for dizziness, syncope, weakness and light-headedness.   Hematological: Does not bruise/bleed easily.   Psychiatric/Behavioral: Negative for depressed mood and stress. The patient is not nervous/anxious.        I have reviewed and/or updated the patient's past medical, past surgical, family, social history, problem list and allergies as appropriate.     Medications:     Current Outpatient Medications:   •  albuterol sulfate  (90 Base) MCG/ACT inhaler, INHALE 2 PUFFS BY MOUTH EVERY 4 HOURS AS NEEDED FOR WHEEZING, Disp: 18 g, Rfl: 5  •  apixaban (Eliquis) 5 MG tablet tablet, Take 1 tablet by mouth Every 12 (Twelve) Hours., Disp: 60 tablet, Rfl: 11  •  atorvastatin (LIPITOR) 20 MG tablet, TAKE 1 TABLET BY MOUTH EVERY DAY, Disp: 90 tablet, Rfl: 3  •  Azelastine HCl 137 MCG/SPRAY solution, 1 spray into the nostril(s) as directed by provider 2 (Two) Times a Day., Disp: 30 mL, Rfl: 5  •  benzonatate (Tessalon Perles) 100 MG capsule, Take 1 capsule by mouth 3 (Three) Times a Day As Needed for Cough., Disp: 60 capsule, Rfl: 1  •  cetirizine (zyrTEC) 10 MG tablet, Take 1 tablet by mouth Daily., " Disp: 30 tablet, Rfl: 2  •  Cholecalciferol (vitamin D3) 125 MCG (5000 UT) capsule capsule, Take 5,000 Units by mouth Daily., Disp: , Rfl:   •  clonazePAM (KlonoPIN) 1 MG tablet, TAKE 1 TABLET BY MOUTH DAILY AS NEEDED FOR ANXIETY., Disp: 30 tablet, Rfl: 0  •  clotrimazole-betamethasone (Lotrisone) 1-0.05 % cream, Apply  topically to the appropriate area as directed 2 (Two) Times a Day., Disp: 45 g, Rfl: 1  •  DULoxetine (CYMBALTA) 60 MG capsule, TAKE ONE CAPSULE BY MOUTH TWICE DAILY, Disp: 180 capsule, Rfl: 3  •  HYDROcodone-acetaminophen (NORCO)  MG per tablet, Take 1 tablet by mouth 4 (Four) Times a Day As Needed., Disp: , Rfl:   •  ipratropium-albuterol (DUO-NEB) 0.5-2.5 mg/3 ml nebulizer, USE 3 ML VIA NEBULIZER EVERY 4 HOURS AS NEEDED FOR WHEEZING, Disp: 360 mL, Rfl: 11  •  loperamide (IMODIUM) 2 MG capsule, Take 1 capsule by mouth 4 (Four) Times a Day As Needed for Diarrhea., Disp: 20 capsule, Rfl: 0  •  O2 (OXYGEN), Inhale 2 L/min As Needed., Disp: , Rfl:   •  ondansetron (ZOFRAN) 4 MG tablet, Take 1 tablet by mouth Every 8 (Eight) Hours As Needed for Nausea or Vomiting., Disp: 90 tablet, Rfl: 0  •  pantoprazole (PROTONIX) 40 MG EC tablet, TAKE 1 TABLET BY MOUTH DAILY, Disp: 90 tablet, Rfl: 3  •  promethazine (PHENERGAN) 25 MG tablet, Take 1 tablet by mouth Every 8 (Eight) Hours As Needed for Nausea or Vomiting., Disp: 30 tablet, Rfl: 1  •  sertraline (ZOLOFT) 100 MG tablet, TAKE 1 & 1/2 TABLET BY MOUTH DAILY, Disp: 135 tablet, Rfl: 3  •  Sodium Fluoride 1.1 % gel, USE AS DIRECTED ON LABEL, Disp: , Rfl:   •  traZODone (DESYREL) 150 MG tablet, Take 1 tablet by mouth Every Evening., Disp: 90 tablet, Rfl: 0    Physical Exam:  Vital Signs: There were no vitals filed for this visit.    Physical Exam not completed given telemedicine video visit    Results Review:   I reviewed the patient's new clinical results.      Assessment / Plan:     1.  Paroxysmal atrial fibrillation   --No significant episodes of  palpitations  --Outpatient cardiac monitoring 2020 showed no recurrent episodes of PAF, reported symptoms corresponded to NSR  --CHADS2-VASc score of 4, continue Eliquis for CVA prophylaxis     2.  Coronary artery disease   --History of nonobstructive coronary artery disease based on remote coronary angiography  --No current chest pain or anginal symptoms  --Continue atorvastatin      3.  Essential hypertension  -- Continue current antihypertensives     4.  Autonomic dysfunction  --History of autonomic dysfunction, currently without autonomic symptoms       Follow Up:   No follow-ups on file.      Thank you for allowing me to participate in the care of your patient. Please to not hesitate to contact me with additional questions or concerns.     VIDAL Curry MD  Interventional Cardiology   07/27/2022  14:39 EDT

## 2022-08-18 ENCOUNTER — TELEPHONE (OUTPATIENT)
Dept: CARDIOLOGY | Facility: CLINIC | Age: 77
End: 2022-08-18

## 2022-08-23 DIAGNOSIS — F41.9 ANXIETY: ICD-10-CM

## 2022-08-24 ENCOUNTER — TELEPHONE (OUTPATIENT)
Dept: INTERNAL MEDICINE | Facility: CLINIC | Age: 77
End: 2022-08-24

## 2022-08-24 DIAGNOSIS — F41.9 ANXIETY: ICD-10-CM

## 2022-08-24 DIAGNOSIS — I48.0 PAROXYSMAL ATRIAL FIBRILLATION: ICD-10-CM

## 2022-08-24 RX ORDER — CLONAZEPAM 1 MG/1
1 TABLET ORAL DAILY PRN
Qty: 30 TABLET | Refills: 1 | OUTPATIENT
Start: 2022-08-24

## 2022-08-24 RX ORDER — CLONAZEPAM 1 MG/1
1 TABLET ORAL DAILY PRN
Qty: 30 TABLET | Refills: 1 | Status: SHIPPED | OUTPATIENT
Start: 2022-08-24 | End: 2022-09-23 | Stop reason: SDUPTHER

## 2022-08-24 NOTE — TELEPHONE ENCOUNTER
Caller: Malu Aguilar    Relationship: Emergency Contact    Best call back number: [phone]    Equipment requested: PORTABLE OXYGEN CONCENTRATOR    Reason for the request: PATIENT NEEDED THE PORTABLE ONE AND NOT THE REGUALR    Prescribing Provider: DR PRADHAN    Additional information or concerns:

## 2022-08-24 NOTE — TELEPHONE ENCOUNTER
Rx Refill Note  Requested Prescriptions     Pending Prescriptions Disp Refills   • clonazePAM (KlonoPIN) 1 MG tablet [Pharmacy Med Name: clonazePAM 1MG TABS*] 30 tablet      Sig: TAKE 1 TABLET BY MOUTH DAILY AS NEEDED FOR ANXIETY      Last office visit with prescribing clinician: 8/4/2022      Next office visit with prescribing clinician: 12/5/2022            LETICIA CHURCH MA  08/24/22, 10:08 EDT

## 2022-08-24 NOTE — TELEPHONE ENCOUNTER
Caller: Malu Aguilar    Relationship: Emergency Contact    Best call back number: [phone]    Requested Prescriptions:   Requested Prescriptions     Pending Prescriptions Disp Refills   • clonazePAM (KlonoPIN) 1 MG tablet 30 tablet 1     Sig: Take 1 tablet by mouth Daily As Needed for Anxiety.        Pharmacy where request should be sent: [facility] [address] [place]  - [phone]  - [phone] FX     Additional details provided by patient:     Does the patient have less than a 3 day supply:  [x] Yes  [] No    Rafa Metz Rep   08/24/22 14:59 EDT

## 2022-08-25 NOTE — TELEPHONE ENCOUNTER
Contacted patient's daughter (on release) and notified that she would need to contact pulmonology since Dr. Poe has been the one to previously ordered the oxygen and he placed order for full tank. Daughter expressed understanding

## 2022-09-12 ENCOUNTER — APPOINTMENT (OUTPATIENT)
Dept: INFUSION THERAPY | Facility: HOSPITAL | Age: 77
End: 2022-09-12

## 2022-09-14 ENCOUNTER — TELEPHONE (OUTPATIENT)
Dept: CARDIOLOGY | Facility: CLINIC | Age: 77
End: 2022-09-14

## 2022-09-14 DIAGNOSIS — I48.0 PAROXYSMAL ATRIAL FIBRILLATION: ICD-10-CM

## 2022-09-21 ENCOUNTER — TELEPHONE (OUTPATIENT)
Dept: CARDIOLOGY | Facility: CLINIC | Age: 77
End: 2022-09-21

## 2022-09-21 NOTE — TELEPHONE ENCOUNTER
Caller: Gabi Dudley    Relationship to patient: Self    Best call back number:[phone]    Patient is needing: PT HAD TO RESCHEDULE APPOINTMENT 9.21.22 TO 10.5.22. SHE IS CURRENTLY ON ELIQUIS BUT SHE STATED THAT WAS GOING TO BE CHANGED TO XARELTO AND THEY WERE CHECKING TO SEE IF THE AID TO HELP PAY FOR MEDICATION WAS APPROVED BECAUSE SHE ONLY HAS ENOUGH ELIQUIS UNTIL Sunday .

## 2022-09-22 ENCOUNTER — APPOINTMENT (OUTPATIENT)
Dept: INFUSION THERAPY | Facility: HOSPITAL | Age: 77
End: 2022-09-22

## 2022-09-22 NOTE — TELEPHONE ENCOUNTER
Spoke with patient's daughter. States she is still taking Eliquis. Informed daughter that a PA/Tiering reduction was submitted for the Xarelto to try and get the cost down, but was denied. Informed her about patient assistance for both and that we have samples of the Eliquis that we are able to provide for her today. Also explained we typically have a larger supply of samples of Xarelto, due to less patient's needing them. If needed she could be switched as well upon doctors orders.

## 2022-09-23 DIAGNOSIS — F41.9 ANXIETY: ICD-10-CM

## 2022-09-23 RX ORDER — CLONAZEPAM 1 MG/1
1 TABLET ORAL DAILY PRN
Qty: 30 TABLET | Refills: 0 | Status: SHIPPED | OUTPATIENT
Start: 2022-09-23 | End: 2022-10-17 | Stop reason: SDUPTHER

## 2022-09-23 NOTE — TELEPHONE ENCOUNTER
Caller: JeffMalu    Relationship: Emergency Contact    Best call back number: [phone]    Requested Prescriptions:   Requested Prescriptions     Pending Prescriptions Disp Refills   • clonazePAM (KlonoPIN) 1 MG tablet 30 tablet 1     Sig: Take 1 tablet by mouth Daily As Needed for Anxiety.        Pharmacy where request should be sent: [facility] [facility] [address]  - [phone]  - [phone] FX     Additional details provided by patient: PATIENT WOULD NEED THIS BEFORE 12 PM TODAY SO THAT PHARMACY WILL BE ABLE TO REFILL THEM FOR PATIENT BECAUSE SHE IS COMPLETELY OUT         Does the patient have less than a 3 day supply:  [x] Yes  [] No    Rafa Alejandre Rep   09/23/22 11:16 EDT

## 2022-09-23 NOTE — TELEPHONE ENCOUNTER
Rx Refill Note  Requested Prescriptions     Pending Prescriptions Disp Refills   • clonazePAM (KlonoPIN) 1 MG tablet 30 tablet 1     Sig: Take 1 tablet by mouth Daily As Needed for Anxiety.      Last office visit with prescribing clinician: 8/4/2022      Next office visit with prescribing clinician: 12/5/2022            Jackie Leone MA  09/23/22, 11:40 EDT     Please advise since provider is out of the office and you are on call.     I contacted patient's daughter (on release) and advised her that request may not be able to be reviewed before requested time since PCP was out of the office and I had to send this to the on-call provider who was currently seeing patients in office. Patient's daughter expressed understanding and requested prescriptions be sent to different pharmacy since preferred pharmacy closed at 12:00 today. Pharmacy updated to reflect requested location.

## 2022-09-26 RX ORDER — RIVAROXABAN 20 MG/1
TABLET, FILM COATED ORAL
Qty: 30 TABLET | Refills: 3 | OUTPATIENT
Start: 2022-09-26

## 2022-09-30 ENCOUNTER — HOSPITAL ENCOUNTER (OUTPATIENT)
Dept: INFUSION THERAPY | Facility: HOSPITAL | Age: 77
Setting detail: INFUSION SERIES
Discharge: HOME | End: 2022-09-30

## 2022-09-30 VITALS
RESPIRATION RATE: 18 BRPM | SYSTOLIC BLOOD PRESSURE: 146 MMHG | OXYGEN SATURATION: 92 % | HEART RATE: 95 BPM | DIASTOLIC BLOOD PRESSURE: 76 MMHG | TEMPERATURE: 98.1 F

## 2022-09-30 DIAGNOSIS — M81.0 AGE-RELATED OSTEOPOROSIS WITHOUT CURRENT PATHOLOGICAL FRACTURE: Primary | ICD-10-CM

## 2022-09-30 PROCEDURE — 25010000002 DENOSUMAB 60 MG/ML SOLUTION PREFILLED SYRINGE: Performed by: INTERNAL MEDICINE

## 2022-09-30 PROCEDURE — 96372 THER/PROPH/DIAG INJ SC/IM: CPT

## 2022-09-30 PROCEDURE — 96401 CHEMO ANTI-NEOPL SQ/IM: CPT

## 2022-09-30 RX ADMIN — DENOSUMAB 60 MG: 60 INJECTION SUBCUTANEOUS at 15:16

## 2022-09-30 NOTE — CODE DOCUMENTATION
Information pamphlet on prolia provided to patient.  Pt verbalizes understanding of treatment risks and benefits.  No further questions at this time

## 2022-10-13 NOTE — TELEPHONE ENCOUNTER
Pts daughter states that Pt is still taking Eliquis due to the Cost of Xarelto unable to be lowered. Per Monalisa Tucker to give samples of Eliquis

## 2022-10-17 DIAGNOSIS — F41.9 ANXIETY: ICD-10-CM

## 2022-10-17 RX ORDER — PANTOPRAZOLE SODIUM 40 MG/1
40 TABLET, DELAYED RELEASE ORAL DAILY
Qty: 90 TABLET | Refills: 3 | Status: SHIPPED | OUTPATIENT
Start: 2022-10-17

## 2022-10-17 RX ORDER — CLONAZEPAM 1 MG/1
1 TABLET ORAL DAILY PRN
Qty: 30 TABLET | Refills: 0 | Status: SHIPPED | OUTPATIENT
Start: 2022-10-17 | End: 2022-12-12 | Stop reason: SDUPTHER

## 2022-10-17 NOTE — TELEPHONE ENCOUNTER
Caller: JeffMalu    Relationship: Emergency Contact    Best call back number: [phone]    Requested Prescriptions:   Requested Prescriptions     Pending Prescriptions Disp Refills   • clonazePAM (KlonoPIN) 1 MG tablet 30 tablet 0     Sig: Take 1 tablet by mouth Daily As Needed for Anxiety.        Pharmacy where request should be sent: [facility] [address] [place]  - [phone]  - [phone] FX     Additional details provided by patient: PATIENT IS OUT OF THIS MEDICATION.    Does the patient have less than a 3 day supply:  [x] Yes  [] No    Rafa Micheals Rep   10/17/22 14:38 EDT

## 2022-10-17 NOTE — TELEPHONE ENCOUNTER
Rx Refill Note  Requested Prescriptions     Pending Prescriptions Disp Refills   • pantoprazole (PROTONIX) 40 MG EC tablet [Pharmacy Med Name: Pantoprazole Sodium 40MG TBEC] 30 tablet      Sig: TAKE 1 TABLET BY MOUTH DAILY      Last office visit with prescribing clinician: 8/4/2022      Next office visit with prescribing clinician: 12/5/2022            Jackie Leone MA  10/17/22, 15:48 EDT

## 2022-10-17 NOTE — TELEPHONE ENCOUNTER
Rx Refill Note  Requested Prescriptions     Pending Prescriptions Disp Refills   • clonazePAM (KlonoPIN) 1 MG tablet 30 tablet 0     Sig: Take 1 tablet by mouth Daily As Needed for Anxiety.      Last office visit with prescribing clinician: 8/4/2022      Next office visit with prescribing clinician: 10/17/2022            Jackie Leone MA  10/17/22, 15:47 EDT

## 2022-11-01 ENCOUNTER — APPOINTMENT (OUTPATIENT)
Dept: GENERAL RADIOLOGY | Facility: HOSPITAL | Age: 77
End: 2022-11-01

## 2022-11-01 ENCOUNTER — OFFICE VISIT (OUTPATIENT)
Dept: CARDIOLOGY | Facility: CLINIC | Age: 77
End: 2022-11-01

## 2022-11-01 ENCOUNTER — HOSPITAL ENCOUNTER (EMERGENCY)
Facility: HOSPITAL | Age: 77
Discharge: HOME | End: 2022-11-01
Attending: EMERGENCY MEDICINE | Admitting: EMERGENCY MEDICINE

## 2022-11-01 VITALS
OXYGEN SATURATION: 91 % | RESPIRATION RATE: 18 BRPM | TEMPERATURE: 98.4 F | SYSTOLIC BLOOD PRESSURE: 149 MMHG | BODY MASS INDEX: 26.99 KG/M2 | WEIGHT: 162 LBS | HEART RATE: 99 BPM | HEIGHT: 65 IN | DIASTOLIC BLOOD PRESSURE: 88 MMHG

## 2022-11-01 VITALS
RESPIRATION RATE: 20 BRPM | HEIGHT: 65 IN | OXYGEN SATURATION: 97 % | SYSTOLIC BLOOD PRESSURE: 122 MMHG | HEART RATE: 65 BPM | WEIGHT: 161 LBS | DIASTOLIC BLOOD PRESSURE: 62 MMHG | BODY MASS INDEX: 26.82 KG/M2

## 2022-11-01 DIAGNOSIS — R06.02 SHORTNESS OF BREATH: ICD-10-CM

## 2022-11-01 DIAGNOSIS — R42 DIZZINESS: ICD-10-CM

## 2022-11-01 DIAGNOSIS — I48.0 PAROXYSMAL ATRIAL FIBRILLATION: ICD-10-CM

## 2022-11-01 DIAGNOSIS — R07.9 CHEST PAIN, UNSPECIFIED TYPE: ICD-10-CM

## 2022-11-01 DIAGNOSIS — J44.1 COPD EXACERBATION: ICD-10-CM

## 2022-11-01 DIAGNOSIS — J18.9 PNEUMONIA OF LEFT LOWER LOBE DUE TO INFECTIOUS ORGANISM: Primary | ICD-10-CM

## 2022-11-01 DIAGNOSIS — I48.0 PAROXYSMAL ATRIAL FIBRILLATION: Primary | ICD-10-CM

## 2022-11-01 LAB
ALBUMIN SERPL-MCNC: 4.1 G/DL (ref 3.5–5.2)
ALBUMIN/GLOB SERPL: 1.4 G/DL
ALP SERPL-CCNC: 74 U/L (ref 39–117)
ALT SERPL W P-5'-P-CCNC: 13 U/L (ref 1–33)
ANION GAP SERPL CALCULATED.3IONS-SCNC: 5.9 MMOL/L (ref 5–15)
AST SERPL-CCNC: 17 U/L (ref 1–32)
BASOPHILS # BLD AUTO: 0.04 10*3/MM3 (ref 0–0.2)
BASOPHILS NFR BLD AUTO: 0.5 % (ref 0–1.5)
BILIRUB SERPL-MCNC: 0.2 MG/DL (ref 0–1.2)
BUN SERPL-MCNC: 25 MG/DL (ref 8–23)
BUN/CREAT SERPL: 32.1 (ref 7–25)
CALCIUM SPEC-SCNC: 9.3 MG/DL (ref 8.6–10.5)
CHLORIDE SERPL-SCNC: 100 MMOL/L (ref 98–107)
CO2 SERPL-SCNC: 37.1 MMOL/L (ref 22–29)
CREAT SERPL-MCNC: 0.78 MG/DL (ref 0.57–1)
D-LACTATE SERPL-SCNC: 0.7 MMOL/L (ref 0.5–2)
DEPRECATED RDW RBC AUTO: 46.8 FL (ref 37–54)
EGFRCR SERPLBLD CKD-EPI 2021: 79.3 ML/MIN/1.73
EOSINOPHIL # BLD AUTO: 0.11 10*3/MM3 (ref 0–0.4)
EOSINOPHIL NFR BLD AUTO: 1.4 % (ref 0.3–6.2)
ERYTHROCYTE [DISTWIDTH] IN BLOOD BY AUTOMATED COUNT: 15.6 % (ref 12.3–15.4)
GLOBULIN UR ELPH-MCNC: 2.9 GM/DL
GLUCOSE SERPL-MCNC: 105 MG/DL (ref 65–99)
HCT VFR BLD AUTO: 37.9 % (ref 34–46.6)
HGB BLD-MCNC: 10.9 G/DL (ref 12–15.9)
HYPOCHROMIA BLD QL: NORMAL
IMM GRANULOCYTES # BLD AUTO: 0.02 10*3/MM3 (ref 0–0.05)
IMM GRANULOCYTES NFR BLD AUTO: 0.3 % (ref 0–0.5)
LYMPHOCYTES # BLD AUTO: 1.83 10*3/MM3 (ref 0.7–3.1)
LYMPHOCYTES NFR BLD AUTO: 23.9 % (ref 19.6–45.3)
MCH RBC QN AUTO: 24 PG (ref 26.6–33)
MCHC RBC AUTO-ENTMCNC: 28.8 G/DL (ref 31.5–35.7)
MCV RBC AUTO: 83.3 FL (ref 79–97)
MONOCYTES # BLD AUTO: 0.54 10*3/MM3 (ref 0.1–0.9)
MONOCYTES NFR BLD AUTO: 7 % (ref 5–12)
NEUTROPHILS NFR BLD AUTO: 5.12 10*3/MM3 (ref 1.7–7)
NEUTROPHILS NFR BLD AUTO: 66.9 % (ref 42.7–76)
NRBC BLD AUTO-RTO: 0 /100 WBC (ref 0–0.2)
NT-PROBNP SERPL-MCNC: 1274 PG/ML (ref 0–1800)
PLAT MORPH BLD: NORMAL
PLATELET # BLD AUTO: 286 10*3/MM3 (ref 140–450)
PMV BLD AUTO: 10.1 FL (ref 6–12)
POTASSIUM SERPL-SCNC: 4.7 MMOL/L (ref 3.5–5.2)
PROCALCITONIN SERPL-MCNC: 0.05 NG/ML (ref 0–0.25)
PROT SERPL-MCNC: 7 G/DL (ref 6–8.5)
RBC # BLD AUTO: 4.55 10*6/MM3 (ref 3.77–5.28)
SARS-COV-2 RNA PNL SPEC NAA+PROBE: NOT DETECTED
SODIUM SERPL-SCNC: 143 MMOL/L (ref 136–145)
TROPONIN T SERPL-MCNC: <0.01 NG/ML (ref 0–0.03)
WBC MORPH BLD: NORMAL
WBC NRBC COR # BLD: 7.66 10*3/MM3 (ref 3.4–10.8)

## 2022-11-01 PROCEDURE — 99283 EMERGENCY DEPT VISIT LOW MDM: CPT

## 2022-11-01 PROCEDURE — 84484 ASSAY OF TROPONIN QUANT: CPT | Performed by: PHYSICIAN ASSISTANT

## 2022-11-01 PROCEDURE — 80053 COMPREHEN METABOLIC PANEL: CPT | Performed by: PHYSICIAN ASSISTANT

## 2022-11-01 PROCEDURE — 87040 BLOOD CULTURE FOR BACTERIA: CPT | Performed by: PHYSICIAN ASSISTANT

## 2022-11-01 PROCEDURE — 99284 EMERGENCY DEPT VISIT MOD MDM: CPT

## 2022-11-01 PROCEDURE — 87635 SARS-COV-2 COVID-19 AMP PRB: CPT | Performed by: PHYSICIAN ASSISTANT

## 2022-11-01 PROCEDURE — 83605 ASSAY OF LACTIC ACID: CPT | Performed by: PHYSICIAN ASSISTANT

## 2022-11-01 PROCEDURE — 83880 ASSAY OF NATRIURETIC PEPTIDE: CPT | Performed by: PHYSICIAN ASSISTANT

## 2022-11-01 PROCEDURE — 85025 COMPLETE CBC W/AUTO DIFF WBC: CPT | Performed by: PHYSICIAN ASSISTANT

## 2022-11-01 PROCEDURE — 25010000002 METHYLPREDNISOLONE PER 125 MG: Performed by: PHYSICIAN ASSISTANT

## 2022-11-01 PROCEDURE — 84145 PROCALCITONIN (PCT): CPT | Performed by: PHYSICIAN ASSISTANT

## 2022-11-01 PROCEDURE — 93005 ELECTROCARDIOGRAM TRACING: CPT | Performed by: EMERGENCY MEDICINE

## 2022-11-01 PROCEDURE — 85007 BL SMEAR W/DIFF WBC COUNT: CPT | Performed by: PHYSICIAN ASSISTANT

## 2022-11-01 PROCEDURE — 94640 AIRWAY INHALATION TREATMENT: CPT

## 2022-11-01 PROCEDURE — 36415 COLL VENOUS BLD VENIPUNCTURE: CPT

## 2022-11-01 PROCEDURE — 99214 OFFICE O/P EST MOD 30 MIN: CPT | Performed by: NURSE PRACTITIONER

## 2022-11-01 PROCEDURE — 96375 TX/PRO/DX INJ NEW DRUG ADDON: CPT

## 2022-11-01 PROCEDURE — 96365 THER/PROPH/DIAG IV INF INIT: CPT

## 2022-11-01 PROCEDURE — 71045 X-RAY EXAM CHEST 1 VIEW: CPT

## 2022-11-01 PROCEDURE — 25010000002 MAGNESIUM SULFATE 2 GM/50ML SOLUTION: Performed by: PHYSICIAN ASSISTANT

## 2022-11-01 RX ORDER — AZITHROMYCIN 250 MG/1
500 TABLET, FILM COATED ORAL ONCE
Status: COMPLETED | OUTPATIENT
Start: 2022-11-01 | End: 2022-11-01

## 2022-11-01 RX ORDER — MAGNESIUM SULFATE HEPTAHYDRATE 40 MG/ML
2 INJECTION, SOLUTION INTRAVENOUS ONCE
Status: COMPLETED | OUTPATIENT
Start: 2022-11-01 | End: 2022-11-01

## 2022-11-01 RX ORDER — AZITHROMYCIN 250 MG/1
TABLET, FILM COATED ORAL
Qty: 6 TABLET | Refills: 0 | Status: SHIPPED | OUTPATIENT
Start: 2022-11-01 | End: 2022-11-24 | Stop reason: HOSPADM

## 2022-11-01 RX ORDER — METHYLPREDNISOLONE SODIUM SUCCINATE 125 MG/2ML
125 INJECTION, POWDER, LYOPHILIZED, FOR SOLUTION INTRAMUSCULAR; INTRAVENOUS ONCE
Status: COMPLETED | OUTPATIENT
Start: 2022-11-01 | End: 2022-11-01

## 2022-11-01 RX ORDER — IPRATROPIUM BROMIDE AND ALBUTEROL SULFATE 2.5; .5 MG/3ML; MG/3ML
9 SOLUTION RESPIRATORY (INHALATION) ONCE
Status: COMPLETED | OUTPATIENT
Start: 2022-11-01 | End: 2022-11-01

## 2022-11-01 RX ORDER — PREDNISONE 50 MG/1
50 TABLET ORAL DAILY
Qty: 4 TABLET | Refills: 0 | Status: SHIPPED | OUTPATIENT
Start: 2022-11-01 | End: 2022-11-05

## 2022-11-01 RX ORDER — AMOXICILLIN AND CLAVULANATE POTASSIUM 875; 125 MG/1; MG/1
1 TABLET, FILM COATED ORAL 2 TIMES DAILY
Qty: 14 TABLET | Refills: 0 | Status: SHIPPED | OUTPATIENT
Start: 2022-11-01 | End: 2022-11-08

## 2022-11-01 RX ORDER — SERTRALINE HYDROCHLORIDE 100 MG/1
TABLET, FILM COATED ORAL
Qty: 135 TABLET | Refills: 3 | Status: SHIPPED | OUTPATIENT
Start: 2022-11-01

## 2022-11-01 RX ORDER — AMOXICILLIN AND CLAVULANATE POTASSIUM 875; 125 MG/1; MG/1
1 TABLET, FILM COATED ORAL ONCE
Status: COMPLETED | OUTPATIENT
Start: 2022-11-01 | End: 2022-11-01

## 2022-11-01 RX ADMIN — AMOXICILLIN AND CLAVULANATE POTASSIUM 1 TABLET: 875; 125 TABLET, FILM COATED ORAL at 19:08

## 2022-11-01 RX ADMIN — AZITHROMYCIN MONOHYDRATE 500 MG: 250 TABLET ORAL at 19:08

## 2022-11-01 RX ADMIN — MAGNESIUM SULFATE HEPTAHYDRATE 2 G: 40 INJECTION, SOLUTION INTRAVENOUS at 17:27

## 2022-11-01 RX ADMIN — METHYLPREDNISOLONE SODIUM SUCCINATE 125 MG: 125 INJECTION, POWDER, FOR SOLUTION INTRAMUSCULAR; INTRAVENOUS at 17:27

## 2022-11-01 RX ADMIN — IPRATROPIUM BROMIDE AND ALBUTEROL SULFATE 9 ML: 2.5; .5 SOLUTION RESPIRATORY (INHALATION) at 18:06

## 2022-11-01 NOTE — ED PROVIDER NOTES
Subjective   History of Present Illness  Patient is a 77-year-old female with history of COPD on 2 L at baseline, atrial fibrillation on Eliquis, IBS, fibromyalgia, HLD, and osteoporosis on Prolia who presents to the emergency department with 1 week of worsening shortness of breath.  Over the last week, she has had difficulty even walking short distances without getting very dyspneic.  She has not had worsening cough, fever, lower extremity edema, or sore throat.  She has not been on steroids in over a year because of her osteoporosis and need for Prolia after she had a osteoporotic fracture in her spine.  She went to see her cardiologist for follow-up and was sent to the ED because of her increased work of breathing as well as her high heart rate.  She has been eating and drinking normally without vomiting or diarrhea.  She denies any melena or hematochezia but daughter at the bedside notes that she is constantly craving ice.  She denies any other complaints at this time including dysuria, cardiac chest pain, abdominal pain, or lightheadedness.        Review of Systems   Constitutional: Negative.    HENT: Negative.    Eyes: Negative.    Respiratory: Positive for shortness of breath.    Cardiovascular: Negative.    Gastrointestinal: Negative.    Endocrine: Negative.    Genitourinary: Negative.    Musculoskeletal: Negative.    Skin: Negative.    Allergic/Immunologic: Negative.    Neurological: Negative.    Hematological: Negative.    Psychiatric/Behavioral: Negative.        Past Medical History:   Diagnosis Date   • Adrenal adenoma    • Anemia    • Arrhythmia    • Asthma    • Atrial fibrillation (HCC)    • Back pain    • Benign colonic polyp    • Benign tumor of adrenal gland    • Cataract     bilateral   • Cholelithiasis    • Chronic bronchitis ([facility])    • Chronic bronchitis with COPD (chronic obstructive pulmonary disease) ([facility])    • COPD (chronic obstructive pulmonary disease) ([facility]) 2005   • Coronary artery disease     • Depression    • Elevated cholesterol    • Environmental and seasonal allergies    • Fibromyalgia    • Gastritis    • Generalized anxiety disorder    • GERD (gastroesophageal reflux disease)    • H/O mammogram    • Hemorrhoids    • History of blood transfusion 1985   • History of blood transfusion 1985   • History of echocardiogram    • History of endometriosis    • History of nuclear stress test    • Hypertension    • IBS (irritable bowel syndrome)    • Impaired functional mobility, balance, gait, and endurance    • Inverted nipple    • Kidney disease    • Kidney stone    • Liver cyst    • Nodular radiologic density    • Nodule of left lung    • On home oxygen therapy     2 liters [place] QHS   • Osteoarthritis    • Osteoporosis    • PONV (postoperative nausea and vomiting)    • Renal cyst    • Sinus problem     2014   • Sinusitis    • Skin cancer     basal cell carcinoma   • SOB (shortness of breath)    • Tobacco use    • Urinary frequency    • Vitamin D deficiency    • Wears glasses    • Wears partial dentures     upper plate       Allergies   Allergen Reactions   • Doxycycline GI Intolerance       Past Surgical History:   Procedure Laterality Date   • APPENDECTOMY  1980s   • CARDIAC CATHETERIZATION  2003   • CATARACT EXTRACTION  2013    both eyes   • CHOLECYSTECTOMY WITH INTRAOPERATIVE CHOLANGIOGRAM N/A 10/30/2020    Procedure: CHOLECYSTECTOMY LAPAROSCOPIC INTRAOPERATIVE CHOLANGIOGRAPHY;  Surgeon: Sima Moses MD;  Location: [facility] OR;  Service: General;  Laterality: N/A;   • COLONOSCOPY  03/11/2013   • COLONOSCOPY  06/21/2016   • COLONOSCOPY N/A 7/24/2019    Procedure: COLONOSCOPY W/ COLD FORCEP POLYPECTOMIES; HOT SNARE POLYPECTOMIES; COLD SNARE POLYPECTOMY;  Surgeon: Goyo Nunez MD;  Location: [facility] ENDOSCOPY;  Service: Gastroenterology   • COLONOSCOPY W/ BIOPSIES AND POLYPECTOMY     • EXPLORATORY LAPAROTOMY N/A 11/23/2020    Procedure: colectomy, right, closure of enterotomy x 2, reduction of internal  volvulus;  Surgeon: Sima Moses MD;  Location: [facility];  Service: General;  Laterality: N/A;   • HYSTERECTOMY      partial   • LYSIS OF ABDOMINAL ADHESIONS     • TONSILLECTOMY     • UPPER GASTROINTESTINAL ENDOSCOPY  2015   • VAGINAL DELIVERY      x2       Family History   Problem Relation Age of Onset   • Stomach cancer Brother    • Colon cancer Maternal Aunt    • Brain cancer Father    • Arthritis Father    • Hypertension Father    • Lung cancer Paternal Grandfather    • Breast cancer Neg Hx    • Ovarian cancer Neg Hx        Social History     Socioeconomic History   • Marital status:    Tobacco Use   • Smoking status: Former     Packs/day: 0.25     Years: 30.00     Pack years: 7.50     Types: Cigarettes     Quit date: 2020     Years since quittin.0   • Smokeless tobacco: Never   Vaping Use   • Vaping Use: Never used   Substance and Sexual Activity   • Alcohol use: No   • Drug use: No   • Sexual activity: Defer           Objective   Physical Exam  Vitals and nursing note reviewed.   Constitutional:       General: She is not in acute distress.     Appearance: Normal appearance.   HENT:      Head: Normocephalic.      Right Ear: External ear normal.      Left Ear: External ear normal.      Nose: Nose normal.      Mouth/Throat:      Mouth: Mucous membranes are moist.   Eyes:      Extraocular Movements: Extraocular movements intact.   Cardiovascular:      Rate and Rhythm: Tachycardia present. Rhythm irregular.   Pulmonary:      Effort: Tachypnea present.      Breath sounds: Decreased breath sounds present.   Chest:      Chest wall: No tenderness.   Abdominal:      General: Abdomen is flat.      Palpations: Abdomen is soft.      Tenderness: There is no abdominal tenderness.   Musculoskeletal:         General: Normal range of motion.      Cervical back: Normal range of motion.      Right lower leg: No edema.      Left lower leg: No edema.   Skin:     General: Skin is warm.    Neurological:      General: No focal deficit present.      Mental Status: She is alert and oriented to person, place, and time.   Psychiatric:         Mood and Affect: Mood normal.         Behavior: Behavior normal.         Procedures           ED Course  ED Course as of 11/01/22 1901 Tue Nov 01, 2022   1841 EKG interpreted by me reveals sinus tachycardia with a rate of 105 bpm.  There are frequent premature supraventricular complexes.  This is an abnormal appearing EKG. [TB]      ED Course User Index  [TB] Rosa Plata MD                                           MDM  Number of Diagnoses or Management Options  Diagnosis management comments: Patient is a 77-year-old female with a history of COPD on 2 L nasal cannula at all times who presents to the ED from her cardiology clinic for concerns of 1 week of worsening shortness of breath.  On arrival, her oxygen saturation is 93% on 2 L but she has very diminished lung sounds bilaterally and feels like she cannot take a deep breath.  She is mildly tachypneic.  Differential considered included COPD exacerbation, pneumonia, viral URI, heart failure, anemia, etc.  Work-up included basic labs, blood cultures, lactate, procalcitonin, respiratory swab, EKG, chest x-ray.  Patient given a continuous DuoNeb, 125 mg of methylprednisolone, 2 g of IV magnesium with significant improvement in her air movement and subjective shortness of breath on reevaluation.  Labs were reviewed and were pertinent for mild anemia with a hemoglobin of 10.9, not significantly changed from her baseline, BNP of 1200 which is normal for age, normal troponin, and a nonischemic EKG.  After her breathing treatment, steroids, magnesium, her heart rate improved to the 80s.  Chest x-ray as read by myself appears to show may be an area of increased density in the left lower lobe concerning for developing pneumonia.  With significantly improved symptoms and now normal vital signs, she is  appropriate for trial of outpatient antibiotics along with a very short course of steroids given her history of osteoporosis and more frequent breathing treatments.  She has been doing 1 nebulizer treatment a day and I will encourage her to do 2-4 for the next 2 to 3 days.  She was given strict return precautions.       Amount and/or Complexity of Data Reviewed  Decide to obtain previous medical records or to obtain history from someone other than the patient: yes        Final diagnoses:   Pneumonia of left lower lobe due to infectious organism   COPD exacerbation (HCC)       ED Disposition  ED Disposition     ED Disposition   Discharge    Condition   Stable    Comment   --             Paul Quinteros MD  [address] [facility] [address]  [facility] [address] [phone]               Medication List      New Prescriptions    amoxicillin-clavulanate 875-125 MG per tablet  Commonly known as: AUGMENTIN  Take 1 tablet by mouth 2 (Two) Times a Day for 7 days.     azithromycin 250 MG tablet  Commonly known as: ZITHROMAX  Take two tablets on day 1 and one tablet each day after     predniSONE 50 MG tablet  Commonly known as: DELTASONE  Take 1 tablet by mouth Daily for 4 days.           Where to Get Your Medications      These medications were sent to [facility] - [place], [place] - [address] [place] [address] [phone]  - [phone] [address] [address]    Phone: [phone]   · amoxicillin-clavulanate 875-125 MG per tablet  · azithromycin 250 MG tablet  · predniSONE 50 MG tablet  · sertraline 100 MG tablet          Bella Covarrubias PA  11/01/22 0070

## 2022-11-01 NOTE — PROGRESS NOTES
"             [facility] Cardiology Office Follow Up Note    Gabi Dudley  [id number]  11/01/2022    Primary Care Provider: Paul Quinteros MD   Referring Provider: No ref. provider found    Chief Complaint: Shortness of breath    History of Present Illness:   Mrs. Gabi Dudley is a 77 y.o. female who presents to the Cardiology Clinic for routine follow-up.  The patient has a past medical history of coronary artery disease, paroxysmal atrial fibrillation, hypertension, and autonomic dysfunction.  She presents today with shortness of breath, dizziness, and weakness which has been worsening over the past 1 to 2 weeks.  She reports the sensation of her heart racing multiple times a day.  She also reports tightness across her chest that occurs across her chest at rest.  This has been occurring nearly every day, but the duration is unknown.  She reports diaphoresis that is \"worse than usual\" for an unspecified amount of time.  She denies She also reports dark urine, but denies dysuria or foul smelling urine. She has been taking her medication as prescribed including Xarelto without significant bruising or bleeding.  She denies dark, tarry, stool.  She reports orthopnea, but denies lower extremity edema.  She has increased her portable oxygen to 4 L with no improvement of dyspnea.  She states that within the past week she has \"crawled across the floor\" because she is so weak.  She reports being worried about her declining health because there has been no one at home to help her recently (daughter when to [place] on vacation, but is here today in the lobby).  She denies fever and chills.  She reports a nonproductive cough.  She offers no other complaints or concerns at this time.    Review of Systems:   Review of Systems   Constitutional: Positive for activity change, diaphoresis and fatigue. Negative for chills, fever and unexpected weight gain.   Eyes: Negative for blurred vision and visual disturbance. "   Respiratory: Positive for cough and shortness of breath. Negative for apnea, chest tightness and wheezing.    Cardiovascular: Positive for chest pain and palpitations. Negative for leg swelling.   Gastrointestinal: Negative for abdominal distention, blood in stool, GERD and indigestion.   Endocrine: Negative for cold intolerance and heat intolerance.   Genitourinary: Negative for hematuria.   Musculoskeletal: Negative for gait problem, joint swelling and myalgias.   Skin: Negative for color change, pallor and wound.   Neurological: Positive for dizziness and weakness. Negative for seizures, syncope, light-headedness, numbness, headache and confusion.   Hematological: Does not bruise/bleed easily.   Psychiatric/Behavioral: Negative for behavioral problems, sleep disturbance, suicidal ideas and depressed mood. The patient is nervous/anxious.      I have reviewed and confirmed the accuracy of the ROS as documented by the MA/LPN/RN REGINA Harrington    I have reviewed and/or updated the patient's past medical, past surgical, family, social history, problem list and allergies as appropriate.     Medications:     Current Outpatient Medications:   •  albuterol sulfate  (90 Base) MCG/ACT inhaler, INHALE 2 PUFFS BY MOUTH EVERY 4 HOURS AS NEEDED FOR WHEEZING, Disp: 18 g, Rfl: 5  •  apixaban (Eliquis) 5 MG tablet tablet, Take 1 tablet by mouth Every 12 (Twelve) Hours., Disp: 60 tablet, Rfl: 11  •  atorvastatin (LIPITOR) 20 MG tablet, TAKE 1 TABLET BY MOUTH EVERY DAY, Disp: 90 tablet, Rfl: 3  •  Azelastine HCl 137 MCG/SPRAY solution, 1 spray into the nostril(s) as directed by provider 2 (Two) Times a Day., Disp: 30 mL, Rfl: 5  •  benzonatate (Tessalon Perles) 100 MG capsule, Take 1 capsule by mouth 3 (Three) Times a Day As Needed for Cough., Disp: 60 capsule, Rfl: 1  •  cetirizine (zyrTEC) 10 MG tablet, Take 1 tablet by mouth Daily., Disp: 30 tablet, Rfl: 2  •  Cholecalciferol (vitamin D3) 125 MCG (5000 UT)  capsule capsule, Take 5,000 Units by mouth Daily., Disp: , Rfl:   •  clonazePAM (KlonoPIN) 1 MG tablet, Take 1 tablet by mouth Daily As Needed for Anxiety., Disp: 30 tablet, Rfl: 0  •  clotrimazole-betamethasone (Lotrisone) 1-0.05 % cream, Apply  topically to the appropriate area as directed 2 (Two) Times a Day., Disp: 45 g, Rfl: 1  •  DULoxetine (CYMBALTA) 60 MG capsule, TAKE ONE CAPSULE BY MOUTH TWICE DAILY, Disp: 180 capsule, Rfl: 3  •  HYDROcodone-acetaminophen (NORCO)  MG per tablet, Take 1 tablet by mouth 4 (Four) Times a Day As Needed., Disp: , Rfl:   •  ipratropium-albuterol (DUO-NEB) 0.5-2.5 mg/3 ml nebulizer, USE 3 ML VIA NEBULIZER EVERY 4 HOURS AS NEEDED FOR WHEEZING, Disp: 360 mL, Rfl: 11  •  loperamide (IMODIUM) 2 MG capsule, Take 1 capsule by mouth 4 (Four) Times a Day As Needed for Diarrhea., Disp: 20 capsule, Rfl: 0  •  O2 (OXYGEN), Inhale 2 L/min As Needed., Disp: , Rfl:   •  ondansetron (ZOFRAN) 4 MG tablet, Take 1 tablet by mouth Every 8 (Eight) Hours As Needed for Nausea or Vomiting., Disp: 90 tablet, Rfl: 0  •  pantoprazole (PROTONIX) 40 MG EC tablet, TAKE 1 TABLET BY MOUTH DAILY, Disp: 90 tablet, Rfl: 3  •  promethazine (PHENERGAN) 25 MG tablet, Take 1 tablet by mouth Every 8 (Eight) Hours As Needed for Nausea or Vomiting., Disp: 30 tablet, Rfl: 1  •  rivaroxaban (Xarelto) 20 MG tablet, Take 1 tablet by mouth Daily., Disp: 30 tablet, Rfl: 3  •  Sodium Fluoride 1.1 % gel, USE AS DIRECTED ON LABEL, Disp: , Rfl:   •  traZODone (DESYREL) 150 MG tablet, Take 1 tablet by mouth Every Evening., Disp: 90 tablet, Rfl: 0  •  azithromycin (ZITHROMAX) 250 MG tablet, Take two tablets on day 1 and one tablet each day after, Disp: 6 tablet, Rfl: 0  •  oxyCODONE-acetaminophen (PERCOCET) 7.5-325 MG per tablet, Take 1 tablet by mouth Every 4 (Four) Hours As Needed., Disp: , Rfl:   •  sertraline (ZOLOFT) 100 MG tablet, TAKE 1 & 1/2 TABLET BY MOUTH DAILY, Disp: 135 tablet, Rfl: 3    Physical Exam:  Vital  "Signs:   Vitals:    11/01/22 1550   BP: 122/62   BP Location: Left arm   Patient Position: Sitting   Pulse: 65   Resp: 20   SpO2: 97%  Comment: 2 LPM O2   Weight: 73 kg (161 lb)   Height: 165.1 cm (65\")     Body mass index is 26.79 kg/m².    Physical Exam  Vitals and nursing note reviewed.   Constitutional:       General: She is in acute distress.      Appearance: She is well-developed. She is ill-appearing.   HENT:      Head: Normocephalic and atraumatic.      Mouth/Throat:      Mouth: Mucous membranes are moist.   Eyes:      General: No scleral icterus.     Extraocular Movements: Extraocular movements intact.   Neck:      Trachea: Trachea normal.   Cardiovascular:      Rate and Rhythm: Regular rhythm. Tachycardia present.      Pulses: Normal pulses.      Heart sounds: Normal heart sounds. No murmur heard.    No friction rub. No gallop.   Pulmonary:      Effort: Accessory muscle usage present.      Breath sounds: Rhonchi present.   Abdominal:      Palpations: Abdomen is soft.      Tenderness: There is no abdominal tenderness.   Musculoskeletal:         General: Normal range of motion.      Cervical back: Neck supple.      Right lower leg: No edema.      Left lower leg: No edema.   Skin:     General: Skin is warm and dry.      Findings: No bruising, lesion or rash.   Neurological:      Mental Status: She is oriented to person, place, and time.      Motor: Weakness present.   Psychiatric:         Mood and Affect: Mood normal.         Behavior: Behavior normal. Behavior is cooperative.         Thought Content: Thought content does not include suicidal ideation.         Results Review:   I reviewed the patient's new clinical results.      ECG 12 Lead    Date/Time: 11/2/2022 7:20 AM  Performed by: Britt Farrar APRN  Authorized by: Britt Farrar APRN   Comparison: compared with previous ECG from 2/14/2022  Rhythm: sinus tachycardia  Ectopy: atrial premature contractions  Rate: tachycardic  BPM: " 100  Other findings: non-specific ST-T wave changes  Comments: Borderline ECG        Assessment / Plan:      1. Paroxysmal atrial fibrillation   --Recent sensation of heart racing  --Mild tachycardia on ECG today  --Outpatient cardiac monitoring 2020 showed no recurrent episodes of PAF, reported symptoms corresponded to NSR  --CHADS2-VASc score of 4, continue Xarelto for CVA prophylaxis     2. Coronary artery disease   --History of nonobstructive coronary artery disease based on remote coronary angiography  --Nonexertional chest pain with increasing frequency of episodes, worsening dyspnea  --Plan for MPS to rule out ischemia  --Continue atorvastatin      3.  Essential hypertension  --Well controlled     4.  Autonomic dysfunction  --History of autonomic dysfunction  --Symptomatic    Due to the nature of her symptoms, the patient agrees to go to the ED to rule out acute cause of symptoms.  She can still proceed with cardiac testing as outpatient.           Follow Up:   DEFERRED f/u appt for now.  Patient is going to the ED for evaluation and treatment.    Thank you for allowing me to participate in the care of your patient. Please to not hesitate to contact me with additional questions or concerns.     REGINA Flower

## 2022-11-01 NOTE — DISCHARGE INSTRUCTIONS
Take your antibiotics for the full course even if all of your symptoms resolve.  Temporarily start using your nebulizer more frequently, at least 3-4 times a day for the next 2 or 3 days.  If you develop high fevers or worsening shortness of breath, immediately return to the emergency department.

## 2022-11-01 NOTE — TELEPHONE ENCOUNTER
Rx Refill Note  Requested Prescriptions     Pending Prescriptions Disp Refills   • sertraline (ZOLOFT) 100 MG tablet [Pharmacy Med Name: Sertraline HCl 100MG TABS] 135 tablet 3     Sig: TAKE 1 & 1/2 TABLET BY MOUTH DAILY      Last office visit with prescribing clinician: 8/4/2022      Next office visit with prescribing clinician: 12/5/2022            Jackie Leone MA  11/01/22, 17:18 EDT

## 2022-11-02 ENCOUNTER — APPOINTMENT (OUTPATIENT)
Dept: CT IMAGING | Facility: HOSPITAL | Age: 77
End: 2022-11-02

## 2022-11-02 PROCEDURE — 93000 ELECTROCARDIOGRAM COMPLETE: CPT | Performed by: NURSE PRACTITIONER

## 2022-11-04 ENCOUNTER — PATIENT OUTREACH (OUTPATIENT)
Dept: CASE MANAGEMENT | Facility: OTHER | Age: 77
End: 2022-11-04

## 2022-11-04 NOTE — OUTREACH NOTE
AMBULATORY CASE MANAGEMENT NOTE    Name and Relationship of Patient/Support Person:  -     Patient Outreach    RN-ACM outreach to patient.  Conversation this date with daughter, Malu.  Patient had an ED visit at [facility] 11/01/22.  Clinical impressions noted as pneumonia of left lower lobe due to infectious organism and COPD exacerbation.  Patient was treated and discharged to home to follow with primary care.  Medication changes noted at discharge include the addition of amoxicillin, azithromycin, and prednisone.    Per daughter, patient is slowly improving and is compliant with medications.  Daughter is available for assistance as needed.  Patient has f/u with pulmonology scheduled for 11/14/22.  No unmet needs/questions/concerns for RN-ACM to address were voiced.         AMBREEN FERRERA  Ambulatory Case Management    11/4/2022, 15:52 EDT

## 2022-11-06 LAB
BACTERIA SPEC AEROBE CULT: NORMAL
BACTERIA SPEC AEROBE CULT: NORMAL

## 2022-11-14 ENCOUNTER — OFFICE VISIT (OUTPATIENT)
Dept: PULMONOLOGY | Facility: CLINIC | Age: 77
End: 2022-11-14

## 2022-11-14 VITALS
HEART RATE: 115 BPM | SYSTOLIC BLOOD PRESSURE: 150 MMHG | HEIGHT: 65 IN | DIASTOLIC BLOOD PRESSURE: 98 MMHG | BODY MASS INDEX: 26.66 KG/M2 | WEIGHT: 160 LBS

## 2022-11-14 DIAGNOSIS — G47.34 NOCTURNAL HYPOXIA: ICD-10-CM

## 2022-11-14 DIAGNOSIS — J96.11 CHRONIC RESPIRATORY FAILURE WITH HYPOXIA AND HYPERCAPNIA: ICD-10-CM

## 2022-11-14 DIAGNOSIS — J96.12 CHRONIC RESPIRATORY FAILURE WITH HYPOXIA AND HYPERCAPNIA: ICD-10-CM

## 2022-11-14 DIAGNOSIS — R09.02 EXERCISE HYPOXEMIA: ICD-10-CM

## 2022-11-14 DIAGNOSIS — J44.9 COPD, SEVERE: Primary | ICD-10-CM

## 2022-11-14 PROCEDURE — 99214 OFFICE O/P EST MOD 30 MIN: CPT | Performed by: NURSE PRACTITIONER

## 2022-11-14 RX ORDER — OXYCODONE AND ACETAMINOPHEN 7.5; 325 MG/1; MG/1
1 TABLET ORAL EVERY 4 HOURS PRN
COMMUNITY

## 2022-11-14 NOTE — PROGRESS NOTES
"Chief Complaint   Patient presents with   • Shortness of Breath   • Follow-up         Subjective   Gabi Dudley is a 77 y.o. female.     History of Present Illness   She f/u for SOB today.     She c/o of being in a lot of pain and she gets up and down a lot and had difficulty using NIV. She states she is a restless sleeper and has insomnia.     Patient comes today for follow up of shortness of breath and COPD.     Symptoms have been stable since the last clinic visit. Patient reports no recent exacerbations.     She has been using duonebs only twice a day due to cost of inhalers.     Exercise tolerance has also remained stable.     Quit smoking 2 years ago.      The following portions of the patient's history were reviewed and updated as appropriate: allergies, current medications, past family history, past medical history, past social history and past surgical history.        Review of Systems   HENT: Positive for sinus pressure and sneezing. Negative for sore throat.    Respiratory: Positive for cough (some), chest tightness, shortness of breath and wheezing.        Objective   Visit Vitals  /98   Pulse 115   Ht 165.1 cm (65\")   Wt 72.6 kg (160 lb)   BMI 26.63 kg/m²   oxygen saturation 93% on room air.         Physical Exam  Vitals reviewed.   HENT:      Head: Atraumatic.      Mouth/Throat:      Mouth: Mucous membranes are moist.   Eyes:      Extraocular Movements: Extraocular movements intact.   Cardiovascular:      Rate and Rhythm: Normal rate and regular rhythm.   Pulmonary:      Effort: Pulmonary effort is normal. No respiratory distress.      Comments: Somewhat decreased air entry without wheezing.  Musculoskeletal:      Cervical back: Neck supple.      Comments: Gait with rollator.    Skin:     General: Skin is warm.   Neurological:      Mental Status: She is alert and oriented to person, place, and time.           Assessment & Plan   Diagnoses and all orders for this visit:    1. COPD, severe (HCC) " (Primary)    2. Chronic respiratory failure with hypoxia and hypercapnia (HCC)    3. Exercise hypoxemia    4. Nocturnal hypoxia           Return in about 4 months (around 3/14/2023) for Recheck, For Dr. Poe.    DISCUSSION (if any):  She uses NIV intermittently throughout the day and night. She does not use it for prolonged periods for multiple reasons.     I have asked her to continue using noninvasive ventilation as much as possible during the day and at night.  She verbalizes understanding, continue using the machine.    I will asked the office staff to obtain a compliance from the [employer].    She had severe obstruction with FEV1 of 31% on PFT from May 2022.     I have provided her with Breztri samples today. She has difficulty affording inhalers due to copay/insurance coverage.     She should continue using duoneb as needed.     Compliance with medications stressed.     Side effects of prescribed medications discussed with the patient    I discussed possibility of lung transplant evaluation and she is interested in having the evaluation. However, she would like to wait until after the holidays due to her daughter having a busy work schedule.       Dictated utilizing Dragon dictation.    This document was electronically signed by REGINA Flores November 21, 2022  14:55 EST

## 2022-11-15 ENCOUNTER — TELEPHONE (OUTPATIENT)
Dept: CARDIOLOGY | Facility: CLINIC | Age: 77
End: 2022-11-15

## 2022-11-15 NOTE — TELEPHONE ENCOUNTER
Follow-up appt was deferred at the time of her visit, with instructions for the patient to call when she was discharged from the ED.  She was discharged that day and treated for left lower lobe pneumonia (troponin and BNP WNL).      When her Holter study has been resulted, we can make the determination on her follow-up.  If it is normal, please let me know and she can be scheduled for a 6 month follow-up.  As it stands right now, she has no follow-up scheduled.    Thanks!

## 2022-11-16 ENCOUNTER — APPOINTMENT (OUTPATIENT)
Dept: CT IMAGING | Facility: HOSPITAL | Age: 77
End: 2022-11-16

## 2022-11-17 ENCOUNTER — APPOINTMENT (OUTPATIENT)
Dept: NUCLEAR MEDICINE | Facility: HOSPITAL | Age: 77
End: 2022-11-17

## 2022-11-21 ENCOUNTER — APPOINTMENT (OUTPATIENT)
Dept: GENERAL RADIOLOGY | Facility: HOSPITAL | Age: 77
DRG: 190 | End: 2022-11-21
Payer: MEDICARE

## 2022-11-21 ENCOUNTER — HOSPITAL ENCOUNTER (INPATIENT)
Facility: HOSPITAL | Age: 77
LOS: 2 days | Discharge: HOME HEALTH | DRG: 190 | End: 2022-11-24
Attending: EMERGENCY MEDICINE | Admitting: STUDENT IN AN ORGANIZED HEALTH CARE EDUCATION/TRAINING PROGRAM
Payer: MEDICARE

## 2022-11-21 DIAGNOSIS — J40 BRONCHITIS: ICD-10-CM

## 2022-11-21 DIAGNOSIS — J96.21 ACUTE ON CHRONIC RESPIRATORY FAILURE WITH HYPOXIA: ICD-10-CM

## 2022-11-21 DIAGNOSIS — F51.04 PSYCHOPHYSIOLOGICAL INSOMNIA: ICD-10-CM

## 2022-11-21 DIAGNOSIS — J44.1 COPD EXACERBATION: Primary | ICD-10-CM

## 2022-11-21 PROBLEM — E66.3 OVERWEIGHT (BMI 25.0-29.9): Status: ACTIVE | Noted: 2022-11-21

## 2022-11-21 LAB
A-A DO2: 186.3 MMHG
ALBUMIN SERPL-MCNC: 3.8 G/DL (ref 3.5–5.2)
ALBUMIN/GLOB SERPL: 1.3 G/DL
ALP SERPL-CCNC: 69 U/L (ref 39–117)
ALT SERPL W P-5'-P-CCNC: 13 U/L (ref 1–33)
ANION GAP SERPL CALCULATED.3IONS-SCNC: 8.7 MMOL/L (ref 5–15)
ANISOCYTOSIS BLD QL: NORMAL
ARTERIAL PATENCY WRIST A: ABNORMAL
AST SERPL-CCNC: 17 U/L (ref 1–32)
ATMOSPHERIC PRESS: 744 MMHG
B PARAPERT DNA SPEC QL NAA+PROBE: NOT DETECTED
B PERT DNA SPEC QL NAA+PROBE: NOT DETECTED
BASE EXCESS BLDA CALC-SCNC: 7.1 MMOL/L (ref 0–2)
BASOPHILS # BLD AUTO: 0.04 10*3/MM3 (ref 0–0.2)
BASOPHILS NFR BLD AUTO: 0.3 % (ref 0–1.5)
BDY SITE: ABNORMAL
BILIRUB SERPL-MCNC: 0.2 MG/DL (ref 0–1.2)
BUN SERPL-MCNC: 19 MG/DL (ref 8–23)
BUN/CREAT SERPL: 29.7 (ref 7–25)
C PNEUM DNA NPH QL NAA+NON-PROBE: NOT DETECTED
CALCIUM SPEC-SCNC: 9.1 MG/DL (ref 8.6–10.5)
CHLORIDE SERPL-SCNC: 96 MMOL/L (ref 98–107)
CO2 SERPL-SCNC: 32.3 MMOL/L (ref 22–29)
COHGB MFR BLD: 1.2 % (ref 0–2)
CREAT SERPL-MCNC: 0.64 MG/DL (ref 0.57–1)
D DIMER PPP FEU-MCNC: 0.42 MCGFEU/ML (ref 0–0.57)
D-LACTATE SERPL-SCNC: 1.5 MMOL/L (ref 0.5–2)
DEPRECATED RDW RBC AUTO: 47.8 FL (ref 37–54)
EGFRCR SERPLBLD CKD-EPI 2021: 92.3 ML/MIN/1.73
EOSINOPHIL # BLD AUTO: 0.03 10*3/MM3 (ref 0–0.4)
EOSINOPHIL NFR BLD AUTO: 0.2 % (ref 0.3–6.2)
ERYTHROCYTE [DISTWIDTH] IN BLOOD BY AUTOMATED COUNT: 16.6 % (ref 12.3–15.4)
FLUAV SUBTYP SPEC NAA+PROBE: NOT DETECTED
FLUBV RNA ISLT QL NAA+PROBE: NOT DETECTED
GAS FLOW AIRWAY: 6 LPM
GLOBULIN UR ELPH-MCNC: 3 GM/DL
GLUCOSE SERPL-MCNC: 153 MG/DL (ref 65–99)
HADV DNA SPEC NAA+PROBE: NOT DETECTED
HCO3 BLDA-SCNC: 32.1 MMOL/L (ref 22–28)
HCOV 229E RNA SPEC QL NAA+PROBE: NOT DETECTED
HCOV HKU1 RNA SPEC QL NAA+PROBE: NOT DETECTED
HCOV NL63 RNA SPEC QL NAA+PROBE: NOT DETECTED
HCOV OC43 RNA SPEC QL NAA+PROBE: NOT DETECTED
HCT VFR BLD AUTO: 33.8 % (ref 34–46.6)
HCT VFR BLD CALC: 30.6 %
HGB BLD-MCNC: 9.8 G/DL (ref 12–15.9)
HMPV RNA NPH QL NAA+NON-PROBE: NOT DETECTED
HOLD SPECIMEN: NORMAL
HOLD SPECIMEN: NORMAL
HPIV1 RNA ISLT QL NAA+PROBE: NOT DETECTED
HPIV2 RNA SPEC QL NAA+PROBE: NOT DETECTED
HPIV3 RNA NPH QL NAA+PROBE: NOT DETECTED
HPIV4 P GENE NPH QL NAA+PROBE: NOT DETECTED
HYPOCHROMIA BLD QL: NORMAL
IMM GRANULOCYTES # BLD AUTO: 0.05 10*3/MM3 (ref 0–0.05)
IMM GRANULOCYTES NFR BLD AUTO: 0.4 % (ref 0–0.5)
INHALED O2 CONCENTRATION: 44 %
LIPASE SERPL-CCNC: 17 U/L (ref 13–60)
LYMPHOCYTES # BLD AUTO: 0.58 10*3/MM3 (ref 0.7–3.1)
LYMPHOCYTES NFR BLD AUTO: 4.7 % (ref 19.6–45.3)
Lab: ABNORMAL
M PNEUMO IGG SER IA-ACNC: NOT DETECTED
MCH RBC QN AUTO: 23.4 PG (ref 26.6–33)
MCHC RBC AUTO-ENTMCNC: 29 G/DL (ref 31.5–35.7)
MCV RBC AUTO: 80.9 FL (ref 79–97)
METHGB BLD QL: 0.7 % (ref 0–1.5)
MODALITY: ABNORMAL
MONOCYTES # BLD AUTO: 0.46 10*3/MM3 (ref 0.1–0.9)
MONOCYTES NFR BLD AUTO: 3.7 % (ref 5–12)
NEUTROPHILS NFR BLD AUTO: 11.17 10*3/MM3 (ref 1.7–7)
NEUTROPHILS NFR BLD AUTO: 90.7 % (ref 42.7–76)
NOTE: ABNORMAL
NRBC BLD AUTO-RTO: 0 /100 WBC (ref 0–0.2)
NT-PROBNP SERPL-MCNC: 531 PG/ML (ref 0–1800)
OXYHGB MFR BLDV: 93.3 % (ref 94–99)
PCO2 BLDA: 47 MM HG (ref 35–45)
PCO2 TEMP ADJ BLD: ABNORMAL MM[HG]
PH BLDA: 7.44 PH UNITS (ref 7.35–7.45)
PH, TEMP CORRECTED: ABNORMAL
PLATELET # BLD AUTO: 256 10*3/MM3 (ref 140–450)
PMV BLD AUTO: 10.4 FL (ref 6–12)
PO2 BLDA: 68.9 MM HG (ref 75–100)
PO2 TEMP ADJ BLD: ABNORMAL MM[HG]
POTASSIUM SERPL-SCNC: 4.1 MMOL/L (ref 3.5–5.2)
PROCALCITONIN SERPL-MCNC: 0.1 NG/ML (ref 0–0.25)
PROT SERPL-MCNC: 6.8 G/DL (ref 6–8.5)
RBC # BLD AUTO: 4.18 10*6/MM3 (ref 3.77–5.28)
RHINOVIRUS RNA SPEC NAA+PROBE: NOT DETECTED
RSV RNA NPH QL NAA+NON-PROBE: NOT DETECTED
SAO2 % BLDCOA: 95.1 % (ref 94–100)
SARS-COV-2 RNA NPH QL NAA+NON-PROBE: NOT DETECTED
SMALL PLATELETS BLD QL SMEAR: ADEQUATE
SODIUM SERPL-SCNC: 137 MMOL/L (ref 136–145)
STOMATOCYTES BLD QL SMEAR: NORMAL
TROPONIN T SERPL-MCNC: <0.01 NG/ML (ref 0–0.03)
VENTILATOR MODE: ABNORMAL
WBC MORPH BLD: NORMAL
WBC NRBC COR # BLD: 12.33 10*3/MM3 (ref 3.4–10.8)
WHOLE BLOOD HOLD COAG: NORMAL
WHOLE BLOOD HOLD SPECIMEN: NORMAL

## 2022-11-21 PROCEDURE — 71045 X-RAY EXAM CHEST 1 VIEW: CPT

## 2022-11-21 PROCEDURE — 25010000002 METHYLPREDNISOLONE PER 125 MG: Performed by: EMERGENCY MEDICINE

## 2022-11-21 PROCEDURE — 99223 1ST HOSP IP/OBS HIGH 75: CPT | Performed by: INTERNAL MEDICINE

## 2022-11-21 PROCEDURE — 84145 PROCALCITONIN (PCT): CPT | Performed by: EMERGENCY MEDICINE

## 2022-11-21 PROCEDURE — 83605 ASSAY OF LACTIC ACID: CPT | Performed by: EMERGENCY MEDICINE

## 2022-11-21 PROCEDURE — 94799 UNLISTED PULMONARY SVC/PX: CPT

## 2022-11-21 PROCEDURE — 0202U NFCT DS 22 TRGT SARS-COV-2: CPT | Performed by: EMERGENCY MEDICINE

## 2022-11-21 PROCEDURE — 25010000002 METHYLPREDNISOLONE PER 40 MG: Performed by: INTERNAL MEDICINE

## 2022-11-21 PROCEDURE — 82375 ASSAY CARBOXYHB QUANT: CPT

## 2022-11-21 PROCEDURE — 85025 COMPLETE CBC W/AUTO DIFF WBC: CPT

## 2022-11-21 PROCEDURE — 25010000002 CEFTRIAXONE SODIUM-DEXTROSE 1-3.74 GM-%(50ML) RECONSTITUTED SOLUTION: Performed by: EMERGENCY MEDICINE

## 2022-11-21 PROCEDURE — 94640 AIRWAY INHALATION TREATMENT: CPT

## 2022-11-21 PROCEDURE — 87040 BLOOD CULTURE FOR BACTERIA: CPT | Performed by: EMERGENCY MEDICINE

## 2022-11-21 PROCEDURE — 36415 COLL VENOUS BLD VENIPUNCTURE: CPT

## 2022-11-21 PROCEDURE — 83690 ASSAY OF LIPASE: CPT | Performed by: EMERGENCY MEDICINE

## 2022-11-21 PROCEDURE — G0378 HOSPITAL OBSERVATION PER HR: HCPCS

## 2022-11-21 PROCEDURE — 83880 ASSAY OF NATRIURETIC PEPTIDE: CPT

## 2022-11-21 PROCEDURE — 80053 COMPREHEN METABOLIC PANEL: CPT

## 2022-11-21 PROCEDURE — 99285 EMERGENCY DEPT VISIT HI MDM: CPT

## 2022-11-21 PROCEDURE — 82805 BLOOD GASES W/O2 SATURATION: CPT

## 2022-11-21 PROCEDURE — 93005 ELECTROCARDIOGRAM TRACING: CPT | Performed by: EMERGENCY MEDICINE

## 2022-11-21 PROCEDURE — 36600 WITHDRAWAL OF ARTERIAL BLOOD: CPT

## 2022-11-21 PROCEDURE — 84484 ASSAY OF TROPONIN QUANT: CPT

## 2022-11-21 PROCEDURE — 94761 N-INVAS EAR/PLS OXIMETRY MLT: CPT

## 2022-11-21 PROCEDURE — 83050 HGB METHEMOGLOBIN QUAN: CPT

## 2022-11-21 PROCEDURE — 25010000002 MAGNESIUM SULFATE 2 GM/50ML SOLUTION: Performed by: EMERGENCY MEDICINE

## 2022-11-21 PROCEDURE — 85007 BL SMEAR W/DIFF WBC COUNT: CPT

## 2022-11-21 PROCEDURE — 85379 FIBRIN DEGRADATION QUANT: CPT | Performed by: EMERGENCY MEDICINE

## 2022-11-21 RX ORDER — METHYLPREDNISOLONE SODIUM SUCCINATE 40 MG/ML
40 INJECTION, POWDER, LYOPHILIZED, FOR SOLUTION INTRAMUSCULAR; INTRAVENOUS EVERY 12 HOURS
Status: DISCONTINUED | OUTPATIENT
Start: 2022-11-21 | End: 2022-11-24 | Stop reason: HOSPADM

## 2022-11-21 RX ORDER — IPRATROPIUM BROMIDE AND ALBUTEROL SULFATE 2.5; .5 MG/3ML; MG/3ML
3 SOLUTION RESPIRATORY (INHALATION) EVERY 4 HOURS PRN
Status: DISCONTINUED | OUTPATIENT
Start: 2022-11-21 | End: 2022-11-24 | Stop reason: HOSPADM

## 2022-11-21 RX ORDER — METHYLPREDNISOLONE SODIUM SUCCINATE 125 MG/2ML
125 INJECTION, POWDER, LYOPHILIZED, FOR SOLUTION INTRAMUSCULAR; INTRAVENOUS ONCE
Status: COMPLETED | OUTPATIENT
Start: 2022-11-21 | End: 2022-11-21

## 2022-11-21 RX ORDER — IPRATROPIUM BROMIDE AND ALBUTEROL SULFATE 2.5; .5 MG/3ML; MG/3ML
3 SOLUTION RESPIRATORY (INHALATION) ONCE
Status: COMPLETED | OUTPATIENT
Start: 2022-11-21 | End: 2022-11-21

## 2022-11-21 RX ORDER — BENZONATATE 100 MG/1
100 CAPSULE ORAL 3 TIMES DAILY PRN
Status: DISCONTINUED | OUTPATIENT
Start: 2022-11-21 | End: 2022-11-24 | Stop reason: HOSPADM

## 2022-11-21 RX ORDER — CEFTRIAXONE 1 G/50ML
1 INJECTION, SOLUTION INTRAVENOUS ONCE
Status: COMPLETED | OUTPATIENT
Start: 2022-11-21 | End: 2022-11-22

## 2022-11-21 RX ORDER — GUAIFENESIN 600 MG/1
600 TABLET, EXTENDED RELEASE ORAL EVERY 12 HOURS SCHEDULED
Status: DISCONTINUED | OUTPATIENT
Start: 2022-11-21 | End: 2022-11-24 | Stop reason: HOSPADM

## 2022-11-21 RX ORDER — SODIUM CHLORIDE 0.9 % (FLUSH) 0.9 %
10 SYRINGE (ML) INJECTION AS NEEDED
Status: DISCONTINUED | OUTPATIENT
Start: 2022-11-21 | End: 2022-11-24 | Stop reason: HOSPADM

## 2022-11-21 RX ORDER — MAGNESIUM SULFATE HEPTAHYDRATE 40 MG/ML
2 INJECTION, SOLUTION INTRAVENOUS ONCE
Status: COMPLETED | OUTPATIENT
Start: 2022-11-21 | End: 2022-11-21

## 2022-11-21 RX ORDER — IPRATROPIUM BROMIDE AND ALBUTEROL SULFATE 2.5; .5 MG/3ML; MG/3ML
3 SOLUTION RESPIRATORY (INHALATION)
Status: DISCONTINUED | OUTPATIENT
Start: 2022-11-21 | End: 2022-11-24 | Stop reason: HOSPADM

## 2022-11-21 RX ADMIN — IPRATROPIUM BROMIDE AND ALBUTEROL SULFATE 3 ML: 2.5; .5 SOLUTION RESPIRATORY (INHALATION) at 18:53

## 2022-11-21 RX ADMIN — METHYLPREDNISOLONE SODIUM SUCCINATE 40 MG: 40 INJECTION, POWDER, FOR SOLUTION INTRAMUSCULAR; INTRAVENOUS at 21:43

## 2022-11-21 RX ADMIN — IPRATROPIUM BROMIDE AND ALBUTEROL SULFATE 3 ML: .5; 3 SOLUTION RESPIRATORY (INHALATION) at 13:21

## 2022-11-21 RX ADMIN — MAGNESIUM SULFATE HEPTAHYDRATE 2 G: 40 INJECTION, SOLUTION INTRAVENOUS at 10:23

## 2022-11-21 RX ADMIN — CEFTRIAXONE 1 G: 1 INJECTION, SOLUTION INTRAVENOUS at 13:53

## 2022-11-21 RX ADMIN — Medication 10 ML: at 21:43

## 2022-11-21 RX ADMIN — GUAIFENESIN 600 MG: 600 TABLET ORAL at 21:43

## 2022-11-21 RX ADMIN — METHYLPREDNISOLONE SODIUM SUCCINATE 125 MG: 125 INJECTION, POWDER, FOR SOLUTION INTRAMUSCULAR; INTRAVENOUS at 10:23

## 2022-11-21 NOTE — ED PROVIDER NOTES
"Subjective   History of Present Illness  77-year-old female presenting with \"I cannot breathe\".  She states that this morning she began feeling short of breath, increased cough nonproductive.  No fevers, chest pain, nausea, vomiting.  She does have history of COPD.  She apparently wears 2 to 3 L at home and was placed on 6 L via EMS.        Review of Systems   Constitutional: Negative.    HENT: Negative.    Eyes: Negative.    Respiratory: Positive for cough and shortness of breath.    Cardiovascular: Negative.    Gastrointestinal: Negative.    Genitourinary: Negative.    Musculoskeletal: Negative.    Skin: Negative.    Neurological: Negative.    Psychiatric/Behavioral: Negative.        Past Medical History:   Diagnosis Date   • Adrenal adenoma    • Anemia    • Arrhythmia    • Asthma    • Atrial fibrillation (HCC)    • Back pain    • Benign colonic polyp    • Benign tumor of adrenal gland    • Cataract     bilateral   • Cholelithiasis    • Chronic bronchitis (HCC)    • Chronic bronchitis with COPD (chronic obstructive pulmonary disease) (HCC)    • COPD (chronic obstructive pulmonary disease) (HCC) 2005   • Coronary artery disease    • Depression    • Elevated cholesterol    • Environmental and seasonal allergies    • Fibromyalgia    • Gastritis    • Generalized anxiety disorder    • GERD (gastroesophageal reflux disease)    • H/O mammogram    • Hemorrhoids    • History of blood transfusion 1985   • History of blood transfusion 1985   • History of echocardiogram    • History of endometriosis    • History of nuclear stress test    • Hypertension    • IBS (irritable bowel syndrome)    • Impaired functional mobility, balance, gait, and endurance    • Inverted nipple    • Kidney disease    • Kidney stone    • Liver cyst    • Nodular radiologic density    • Nodule of left lung    • On home oxygen therapy     2 liters NC QHS   • Osteoarthritis    • Osteoporosis    • PONV (postoperative nausea and vomiting)    • Renal cyst  "   • Sinus problem        • Sinusitis    • Skin cancer     basal cell carcinoma   • SOB (shortness of breath)    • Tobacco use    • Urinary frequency    • Vitamin D deficiency    • Wears glasses    • Wears partial dentures     upper plate       Allergies   Allergen Reactions   • Doxycycline GI Intolerance       Past Surgical History:   Procedure Laterality Date   • APPENDECTOMY     • CARDIAC CATHETERIZATION     • CATARACT EXTRACTION      both eyes   • CHOLECYSTECTOMY WITH INTRAOPERATIVE CHOLANGIOGRAM N/A 10/30/2020    Procedure: CHOLECYSTECTOMY LAPAROSCOPIC INTRAOPERATIVE CHOLANGIOGRAPHY;  Surgeon: Sima Moses MD;  Location: [facility] OR;  Service: General;  Laterality: N/A;   • COLONOSCOPY  2013   • COLONOSCOPY  2016   • COLONOSCOPY N/A 2019    Procedure: COLONOSCOPY W/ COLD FORCEP POLYPECTOMIES; HOT SNARE POLYPECTOMIES; COLD SNARE POLYPECTOMY;  Surgeon: Goyo Nunez MD;  Location: [facility] ENDOSCOPY;  Service: Gastroenterology   • COLONOSCOPY W/ BIOPSIES AND POLYPECTOMY     • EXPLORATORY LAPAROTOMY N/A 2020    Procedure: colectomy, right, closure of enterotomy x 2, reduction of internal volvulus;  Surgeon: Sima Moses MD;  Location: [facility] OR;  Service: General;  Laterality: N/A;   • HYSTERECTOMY      partial   • LYSIS OF ABDOMINAL ADHESIONS     • TONSILLECTOMY     • UPPER GASTROINTESTINAL ENDOSCOPY  2015   • VAGINAL DELIVERY      x2       Family History   Problem Relation Age of Onset   • Stomach cancer Brother    • Colon cancer Maternal Aunt    • Brain cancer Father    • Arthritis Father    • Hypertension Father    • Lung cancer Paternal Grandfather    • Breast cancer Neg Hx    • Ovarian cancer Neg Hx        Social History     Socioeconomic History   • Marital status:    Tobacco Use   • Smoking status: Former     Packs/day: 0.25     Years: 30.00     Pack years: 7.50     Types: Cigarettes     Quit date: 2020     Years since quittin.0    • Smokeless tobacco: Never   Vaping Use   • Vaping Use: Never used   Substance and Sexual Activity   • Alcohol use: No   • Drug use: No   • Sexual activity: Defer           Objective   Physical Exam  Constitutional:       General: She is not in acute distress.     Appearance: She is not toxic-appearing or diaphoretic.      Comments: Chronically ill-appearing   HENT:      Head: Normocephalic and atraumatic.      Right Ear: External ear normal.      Left Ear: External ear normal.      Nose: Nose normal.      Mouth/Throat:      Mouth: Mucous membranes are moist.      Pharynx: Oropharynx is clear.   Eyes:      Extraocular Movements: Extraocular movements intact.      Conjunctiva/sclera: Conjunctivae normal.      Pupils: Pupils are equal, round, and reactive to light.   Cardiovascular:      Rate and Rhythm: Regular rhythm.      Pulses: Normal pulses.      Heart sounds: Normal heart sounds.      Comments: Mild tachycardia  Pulmonary:      Effort: No respiratory distress.      Comments: Diminished throughout, increased work of breathing  Abdominal:      General: Bowel sounds are normal. There is no distension.      Tenderness: There is no abdominal tenderness.   Musculoskeletal:         General: No swelling, tenderness or deformity. Normal range of motion.      Cervical back: Normal range of motion.   Skin:     General: Skin is warm and dry.      Capillary Refill: Capillary refill takes less than 2 seconds.      Findings: No rash.   Neurological:      General: No focal deficit present.      Mental Status: She is alert and oriented to person, place, and time.   Psychiatric:         Mood and Affect: Mood normal.         Behavior: Behavior normal.         Procedures           ED Course                                           MDM  Number of Diagnoses or Management Options  Acute on chronic respiratory failure with hypoxia (HCC)  Bronchitis  COPD exacerbation (HCC)  Diagnosis management comments: 77-year-old female with  difficulty breathing.  Chronically ill-appearing lady who has tachycardia and increased work of breathing on exam.  Will obtain labs to include D-dimer.  Will give symptomatic treatment.  Disposition pending.    DDx: COPD, pneumonia, PE, viral illness    EKG interpreted by me: Sinus tachycardia, no acute ST changes, some nonspecific T waves, this is an abnormal EKG    Labs notable for mild leukocytosis.  Viral panel negative.  Chest x-ray with findings consistent with bronchitis.  She still feels dyspneic despite treatment here in the ER and is still requiring increased supplemental oxygen.  Discussed with Dr. Kerley for admission.       Amount and/or Complexity of Data Reviewed  Decide to obtain previous medical records or to obtain history from someone other than the patient: yes        Final diagnoses:   COPD exacerbation (HCC)   Bronchitis   Acute on chronic respiratory failure with hypoxia (HCC)        Alberto Cox MD  11/21/22 6050

## 2022-11-21 NOTE — H&P
[facility]   HISTORY AND PHYSICAL      Name:  Gabi Dudley   Age:  77 y.o.  Sex:  female  :  1945  MRN:  [id number]   Visit Number:  [id number]  Admission Date:  2022  Date Of Service:  22  Primary Care Physician:  Paul Quinteros MD    Chief Complaint:     Cough and shortness of breath.    History Of Presenting Illness:      Ms. Dudley is a 77-year-old female with history of atrial fibrillation on anticoagulation, COPD on home oxygen at 3 L, coronary artery disease, GERD, osteoarthritis was brought to the emergency room by EMS to the emergency room with symptoms of cough and increased shortness of breath.  Patient states that she was in the emergency room on 2022 with similar complaints and was subsequently discharged home on Augmentin, azithromycin and prednisone.  She completed the course but never felt better and subsequently started having significant shortness of breath.  In the last couple days, patient has been progressively dyspneic and has been having trouble to walk to the bathroom and back.  Patient became significantly distressed today when she was in the bathroom and had called her daughter to help her.  She was subsequently was brought to the emergency room by EMS.  She denies any chest pain.  No prior history of coronary artery disease or CHF.  She follows up with Dr. Poe who is her pulmonologist.  Patient has a BiPAP at home but unfortunately she has not been compliant.  She states that she quit smoking 2 years ago.  Her daughter lives with her but her daughter works during the day.    In the emergency room, she was afebrile but tachycardic at 116, with initial blood pressure of 170/105.  She was hypoxic when EMS saw her and they had to put her on 6 L of nasal cannula oxygen and she came in with saturations of 98% on the 6 L.  Blood work including CMP and CBC was unremarkable except for a glucose of 153, WBC of 12 and a hemoglobin of 9.8.   Troponin T, proBNP, lactic acid, lipase and procalcitonin levels were within normal limits.  D-dimer level was also within normal limits.  Respiratory panel including COVID-19 was within normal limits.  Patient recently had an echocardiogram on 11/2/2022 which showed normal left ventricular ejection fraction of 60% with normal diastolic filling without any significant valvular abnormalities.  Right ventricular size and systolic function was also normal.  Chest x-ray done in the emergency room showed emphysema with possible bronchitis no focal consolidation was noted.  Blood cultures were drawn in the emergency room.  Patient was given bronchodilators, IV magnesium, IV Solu-Medrol as well as a dose of ceftriaxone in the emergency room and is currently being admitted to the medical floor with telemetry.    Review Of Systems:    All systems were reviewed and negative except as mentioned in history of presenting illness, assessment and plan.    Past Medical History: Patient  has a past medical history of Adrenal adenoma, Anemia, Arrhythmia, Asthma, Atrial fibrillation (HCC), Back pain, Benign colonic polyp, Benign tumor of adrenal gland, Cataract, Cholelithiasis, Chronic bronchitis (HCC), Chronic bronchitis with COPD (chronic obstructive pulmonary disease) (HCC), COPD (chronic obstructive pulmonary disease) (HCC) (2005), Coronary artery disease, Depression, Elevated cholesterol, Environmental and seasonal allergies, Fibromyalgia, Gastritis, Generalized anxiety disorder, GERD (gastroesophageal reflux disease), H/O mammogram, Hemorrhoids, History of blood transfusion (1985), History of blood transfusion (1985), History of echocardiogram, History of endometriosis, History of nuclear stress test, Hypertension, IBS (irritable bowel syndrome), Impaired functional mobility, balance, gait, and endurance, Inverted nipple, Kidney disease, Kidney stone, Liver cyst, Nodular radiologic density, Nodule of left lung, On home oxygen  therapy, Osteoarthritis, Osteoporosis, PONV (postoperative nausea and vomiting), Renal cyst, Sinus problem, Sinusitis, Skin cancer, SOB (shortness of breath), Tobacco use, Urinary frequency, Vitamin D deficiency, Wears glasses, and Wears partial dentures.    Past Surgical History: Patient  has a past surgical history that includes Appendectomy (1980s); Tonsillectomy (1987); Colonoscopy w/ biopsies and polypectomy; Colonoscopy (03/11/2013); Colonoscopy (06/21/2016); Upper gastrointestinal endoscopy (01/13/2015); Vaginal delivery; Colonoscopy (N/A, 7/24/2019); Cataract extraction (2013); Hysterectomy (1980s); Cardiac catheterization (2003); Abdominal adhesion surgery; cholecystectomy with intraoperative cholangiogram (N/A, 10/30/2020); and Exploratory Laparotomy (N/A, 11/23/2020).    Social History: Patient  reports that she quit smoking about 2 years ago. Her smoking use included cigarettes. She has a 7.50 pack-year smoking history. She has never used smokeless tobacco. She reports that she does not drink alcohol and does not use drugs.    Family History:  Patient family history includes Arthritis in her father; Brain cancer in her father; Colon cancer in her maternal aunt; Hypertension in her father; Lung cancer in her paternal grandfather; Stomach cancer in her brother.     Allergies:      Doxycycline    Home Medications:    Prior to Admission Medications     Prescriptions Last Dose Informant Patient Reported? Taking?    albuterol sulfate  (90 Base) MCG/ACT inhaler   No No    INHALE 2 PUFFS BY MOUTH EVERY 4 HOURS AS NEEDED FOR WHEEZING    apixaban (Eliquis) 5 MG tablet tablet   No No    Take 1 tablet by mouth Every 12 (Twelve) Hours.    atorvastatin (LIPITOR) 20 MG tablet   No No    TAKE 1 TABLET BY MOUTH EVERY DAY    Azelastine HCl 137 MCG/SPRAY solution   No No    1 spray into the nostril(s) as directed by provider 2 (Two) Times a Day.    azithromycin (ZITHROMAX) 250 MG tablet   No No    Take two tablets  on day 1 and one tablet each day after    benzonatate (Tessalon Perles) 100 MG capsule   No No    Take 1 capsule by mouth 3 (Three) Times a Day As Needed for Cough.    cetirizine (zyrTEC) 10 MG tablet   No No    Take 1 tablet by mouth Daily.    Cholecalciferol (vitamin D3) 125 MCG (5000 UT) capsule capsule  Self Yes No    Take 5,000 Units by mouth Daily.    clonazePAM (KlonoPIN) 1 MG tablet   No No    Take 1 tablet by mouth Daily As Needed for Anxiety.    clotrimazole-betamethasone (Lotrisone) 1-0.05 % cream   No No    Apply  topically to the appropriate area as directed 2 (Two) Times a Day.    DULoxetine (CYMBALTA) 60 MG capsule   No No    TAKE ONE CAPSULE BY MOUTH TWICE DAILY    HYDROcodone-acetaminophen (NORCO)  MG per tablet   Yes No    Take 1 tablet by mouth 4 (Four) Times a Day As Needed.    ipratropium-albuterol (DUO-NEB) 0.5-2.5 mg/3 ml nebulizer   No No    USE 3 ML VIA NEBULIZER EVERY 4 HOURS AS NEEDED FOR WHEEZING    loperamide (IMODIUM) 2 MG capsule   No No    Take 1 capsule by mouth 4 (Four) Times a Day As Needed for Diarrhea.    O2 (OXYGEN)   Yes No    Inhale 2 L/min As Needed.    ondansetron (ZOFRAN) 4 MG tablet   No No    Take 1 tablet by mouth Every 8 (Eight) Hours As Needed for Nausea or Vomiting.    oxyCODONE-acetaminophen (PERCOCET) 7.5-325 MG per tablet   Yes No    Take 1 tablet by mouth Every 4 (Four) Hours As Needed.    pantoprazole (PROTONIX) 40 MG EC tablet   No No    TAKE 1 TABLET BY MOUTH DAILY    promethazine (PHENERGAN) 25 MG tablet   No No    Take 1 tablet by mouth Every 8 (Eight) Hours As Needed for Nausea or Vomiting.    rivaroxaban (Xarelto) 20 MG tablet   No No    Take 1 tablet by mouth Daily.    sertraline (ZOLOFT) 100 MG tablet   No No    TAKE 1 & 1/2 TABLET BY MOUTH DAILY    Sodium Fluoride 1.1 % gel   Yes No    USE AS DIRECTED ON LABEL    traZODone (DESYREL) 150 MG tablet   No No    Take 1 tablet by mouth Every Evening.        ED Medications:    Medications   sodium  "chloride 0.9 % flush 10 mL (has no administration in time range)   cefTRIAXone (ROCEPHIN) IVPB 1 g/50ml dextrose (premix) (1 g Intravenous New Bag 11/21/22 1353)   methylPREDNISolone sodium succinate (SOLU-Medrol) injection 125 mg (125 mg Intravenous Given 11/21/22 1023)   magnesium sulfate 2g/50 mL (PREMIX) infusion (0 g Intravenous Stopped 11/21/22 1121)   ipratropium-albuterol (DUO-NEB) nebulizer solution 3 mL (3 mL Nebulization Given 11/21/22 1321)     Vital Signs:  Temp:  [98.5 °F (36.9 °C)] 98.5 °F (36.9 °C)  Heart Rate:  [109-116] 113  Resp:  [20-22] 20  BP: (132-183)/() 132/107        11/21/22  1007 11/21/22  1030   Weight: 75.3 kg (166 lb) 75.3 kg (166 lb)     Body mass index is 27.62 kg/m².    Physical Exam:     Most recent vital Signs: BP (!) 132/107   Pulse 113   Temp 98.5 °F (36.9 °C) (Oral)   Resp 20   Ht 165.1 cm (65\")   Wt 75.3 kg (166 lb)   SpO2 91%   BMI 27.62 kg/m²     Physical Exam  Constitutional:       General: She is in acute distress.      Appearance: She is obese. She is ill-appearing.   HENT:      Head: Normocephalic and atraumatic.      Right Ear: External ear normal.      Left Ear: External ear normal.      Nose: Nose normal.      Mouth/Throat:      Mouth: Mucous membranes are moist.   Eyes:      Extraocular Movements: Extraocular movements intact.      Conjunctiva/sclera: Conjunctivae normal.   Cardiovascular:      Rate and Rhythm: Regular rhythm. Tachycardia present.      Pulses: Normal pulses.      Heart sounds: Normal heart sounds. No murmur heard.  Pulmonary:      Breath sounds: No wheezing or rales.      Comments: Accessory muscle use noted.  Breath sounds are decreased bilaterally.  No wheezing or crackles heard.  Abdominal:      General: Bowel sounds are normal.      Palpations: Abdomen is soft.      Tenderness: There is no abdominal tenderness. There is no guarding or rebound.      Comments: Obese   Musculoskeletal:         General: Normal range of motion.      " Cervical back: Neck supple. No rigidity.      Right lower leg: Edema present.      Left lower leg: Edema present.      Comments: 1+ pitting ankle edema noted.   Skin:     General: Skin is warm.      Findings: No erythema or rash.   Neurological:      General: No focal deficit present.      Mental Status: She is alert and oriented to person, place, and time. Mental status is at baseline.   Psychiatric:         Mood and Affect: Mood normal.         Behavior: Behavior normal.       Laboratory data:    I have reviewed the labs done in the emergency room.    Results from last 7 days   Lab Units 11/21/22  1019   SODIUM mmol/L 137   POTASSIUM mmol/L 4.1   CHLORIDE mmol/L 96*   CO2 mmol/L 32.3*   BUN mg/dL 19   CREATININE mg/dL 0.64   CALCIUM mg/dL 9.1   BILIRUBIN mg/dL 0.2   ALK PHOS U/L 69   ALT (SGPT) U/L 13   AST (SGOT) U/L 17   GLUCOSE mg/dL 153*     Results from last 7 days   Lab Units 11/21/22  1019   WBC 10*3/mm3 12.33*   HEMOGLOBIN g/dL 9.8*   HEMATOCRIT % 33.8*   PLATELETS 10*3/mm3 256         Results from last 7 days   Lab Units 11/21/22  1019   TROPONIN T ng/mL <0.010     Results from last 7 days   Lab Units 11/21/22  1019   PROBNP pg/mL 531.0         Results from last 7 days   Lab Units 11/21/22  1019   LIPASE U/L 17     Results from last 7 days   Lab Units 11/21/22  1027   PH, ARTERIAL pH units 7.443   PO2 ART mm Hg 68.9*   PCO2, ARTERIAL mm Hg 47.0*   HCO3 ART mmol/L 32.1*     EKG:      EKG done in the emergency room was reviewed by me.  It shows sinus tachycardia at 114 bpm.  Normal axis.  No significant ST-T changes were noted.    Radiology:    XR Chest 1 View    Result Date: 11/21/2022  CLINICAL INDICATION:  SOA Triage Protocol shortness of breath.  EXAMINATION TECHNIQUE: XR CHEST 1 VW-  COMPARISON: Radiographs 11/01/2022.  FINDINGS: No dense focal airspace consolidation. Emphysema. Bibasilar linear and reticular opacities. No pneumothorax or pleural effusion. Mild bilateral perihilar fullness/prominent  pulmonary arteries. Mild bronchial wall thickening. Heart is normal in size. Aortic atherosclerosis and tortuosity. No acute osseous or soft tissue abnormality. Degenerative changes of the thoracic spine and bilateral shoulder joints. No free air under hemidiaphragms.      Emphysema. Possible bronchitis. No dense focal airspace consolidation.    Images personally reviewed, interpreted and dictated by SHAMIKA Sanchez.       This report was signed and finalized on 11/21/2022 11:05 AM by SHAMIKA Sanchez.    Assessment:    1. Acute on chronic hypoxic respiratory failure, POA.  2. Acute COPD exacerbation, POA.  3. Paroxysmal atrial fibrillation on apixaban.  4. Essential hypertension.  5. Chronic anemia.    Plan:    Patient is currently being admitted to the medical floor with telemetry as an inpatient.  Anticipate more than 2 days of hospital stay due to her significant respiratory distress and respiratory failure.    Respiratory failure/COPD exacerbation.   - Patient will be continued on nasal cannula oxygen at 6 L and we will continue to down titrate to her baseline levels as tolerated.  - Continue bronchodilators, budesonide, IV Solu-Medrol and mucolytic agents.  - Continue BiPAP therapy at night and I have strongly advised her to be compliant with her home BiPAP regimen.  - Fortunately she is not a current smoker and declined nicotine patch.  - We will continue antibiotics therapy with Rocephin for a total of 5 days.    Essential hypertension/paroxysmal atrial fibrillation.  - Continue home medications including apixaban.    We will consult physical and occupational therapy.  Her CODE STATUS is full code.    Risk Assessment: Moderate  DVT Prophylaxis: Apixaban  Code Status: Full  Diet: Cardiac    Advance Care Planning      ACP discussion was held with the patient during this visit. Patient does not have an advance directive, information provided.         Lenny Dewitt MD  11/21/22  14:06 EST    Dictated  utilizing Dragon dictation.

## 2022-11-22 PROCEDURE — 94799 UNLISTED PULMONARY SVC/PX: CPT

## 2022-11-22 PROCEDURE — 94664 DEMO&/EVAL PT USE INHALER: CPT

## 2022-11-22 PROCEDURE — 97165 OT EVAL LOW COMPLEX 30 MIN: CPT

## 2022-11-22 PROCEDURE — 25010000002 METHYLPREDNISOLONE PER 40 MG: Performed by: INTERNAL MEDICINE

## 2022-11-22 PROCEDURE — 94761 N-INVAS EAR/PLS OXIMETRY MLT: CPT

## 2022-11-22 PROCEDURE — 97162 PT EVAL MOD COMPLEX 30 MIN: CPT

## 2022-11-22 PROCEDURE — 99232 SBSQ HOSP IP/OBS MODERATE 35: CPT | Performed by: FAMILY MEDICINE

## 2022-11-22 RX ORDER — CETIRIZINE HYDROCHLORIDE 10 MG/1
10 TABLET ORAL DAILY
Status: DISCONTINUED | OUTPATIENT
Start: 2022-11-22 | End: 2022-11-24 | Stop reason: HOSPADM

## 2022-11-22 RX ORDER — CLONAZEPAM 0.5 MG/1
1 TABLET ORAL DAILY PRN
Status: DISCONTINUED | OUTPATIENT
Start: 2022-11-22 | End: 2022-11-24 | Stop reason: HOSPADM

## 2022-11-22 RX ORDER — DULOXETIN HYDROCHLORIDE 30 MG/1
60 CAPSULE, DELAYED RELEASE ORAL 2 TIMES DAILY
Status: DISCONTINUED | OUTPATIENT
Start: 2022-11-22 | End: 2022-11-24 | Stop reason: HOSPADM

## 2022-11-22 RX ORDER — TRAZODONE HYDROCHLORIDE 50 MG/1
150 TABLET ORAL EVERY EVENING
Status: DISCONTINUED | OUTPATIENT
Start: 2022-11-22 | End: 2022-11-24 | Stop reason: HOSPADM

## 2022-11-22 RX ORDER — GUAIFENESIN 600 MG/1
1200 TABLET, EXTENDED RELEASE ORAL 2 TIMES DAILY
COMMUNITY
End: 2022-12-05

## 2022-11-22 RX ORDER — FLUTICASONE PROPIONATE 50 MCG
2 SPRAY, SUSPENSION (ML) NASAL DAILY
COMMUNITY

## 2022-11-22 RX ORDER — PANTOPRAZOLE SODIUM 40 MG/1
40 TABLET, DELAYED RELEASE ORAL DAILY
Status: DISCONTINUED | OUTPATIENT
Start: 2022-11-22 | End: 2022-11-24 | Stop reason: HOSPADM

## 2022-11-22 RX ORDER — ALBUTEROL SULFATE 2.5 MG/3ML
2.5 SOLUTION RESPIRATORY (INHALATION) EVERY 6 HOURS PRN
Status: DISCONTINUED | OUTPATIENT
Start: 2022-11-22 | End: 2022-11-24 | Stop reason: HOSPADM

## 2022-11-22 RX ORDER — OXYCODONE AND ACETAMINOPHEN 7.5; 325 MG/1; MG/1
1 TABLET ORAL EVERY 6 HOURS PRN
Status: DISCONTINUED | OUTPATIENT
Start: 2022-11-22 | End: 2022-11-24 | Stop reason: HOSPADM

## 2022-11-22 RX ORDER — ATORVASTATIN CALCIUM 20 MG/1
20 TABLET, FILM COATED ORAL DAILY
Status: DISCONTINUED | OUTPATIENT
Start: 2022-11-22 | End: 2022-11-24 | Stop reason: HOSPADM

## 2022-11-22 RX ADMIN — APIXABAN 5 MG: 5 TABLET, FILM COATED ORAL at 21:17

## 2022-11-22 RX ADMIN — IPRATROPIUM BROMIDE AND ALBUTEROL SULFATE 3 ML: 2.5; .5 SOLUTION RESPIRATORY (INHALATION) at 07:04

## 2022-11-22 RX ADMIN — CETIRIZINE HYDROCHLORIDE 10 MG: 10 TABLET, FILM COATED ORAL at 08:55

## 2022-11-22 RX ADMIN — METHYLPREDNISOLONE SODIUM SUCCINATE 40 MG: 40 INJECTION, POWDER, FOR SOLUTION INTRAMUSCULAR; INTRAVENOUS at 21:17

## 2022-11-22 RX ADMIN — Medication 10 ML: at 21:17

## 2022-11-22 RX ADMIN — OXYCODONE HYDROCHLORIDE AND ACETAMINOPHEN 1 TABLET: 7.5; 325 TABLET ORAL at 23:30

## 2022-11-22 RX ADMIN — IPRATROPIUM BROMIDE AND ALBUTEROL SULFATE 3 ML: 2.5; .5 SOLUTION RESPIRATORY (INHALATION) at 20:26

## 2022-11-22 RX ADMIN — METHYLPREDNISOLONE SODIUM SUCCINATE 40 MG: 40 INJECTION, POWDER, FOR SOLUTION INTRAMUSCULAR; INTRAVENOUS at 09:41

## 2022-11-22 RX ADMIN — Medication 10 ML: at 21:18

## 2022-11-22 RX ADMIN — GUAIFENESIN 600 MG: 600 TABLET ORAL at 08:55

## 2022-11-22 RX ADMIN — DULOXETINE HYDROCHLORIDE 60 MG: 30 CAPSULE, DELAYED RELEASE ORAL at 21:17

## 2022-11-22 RX ADMIN — SERTRALINE 100 MG: 50 TABLET, FILM COATED ORAL at 08:55

## 2022-11-22 RX ADMIN — PANTOPRAZOLE SODIUM 40 MG: 40 TABLET, DELAYED RELEASE ORAL at 08:55

## 2022-11-22 RX ADMIN — DULOXETINE HYDROCHLORIDE 60 MG: 30 CAPSULE, DELAYED RELEASE ORAL at 08:55

## 2022-11-22 RX ADMIN — GUAIFENESIN 600 MG: 600 TABLET ORAL at 21:17

## 2022-11-22 RX ADMIN — ATORVASTATIN CALCIUM 20 MG: 20 TABLET, FILM COATED ORAL at 08:55

## 2022-11-22 RX ADMIN — IPRATROPIUM BROMIDE AND ALBUTEROL SULFATE 3 ML: 2.5; .5 SOLUTION RESPIRATORY (INHALATION) at 13:11

## 2022-11-22 RX ADMIN — APIXABAN 5 MG: 5 TABLET, FILM COATED ORAL at 08:55

## 2022-11-22 RX ADMIN — TRAZODONE HYDROCHLORIDE 150 MG: 50 TABLET ORAL at 16:04

## 2022-11-22 NOTE — PROGRESS NOTES
[facility]    Name:  Gabi Dudley   Age:  77 y.o.  Sex:  female  :  1945  MRN:  [id number]   Visit Number:  [id number]  Admission Date:  2022  Date Of Service:  22  Primary Care Physician:  Paul Quinteros MD     LOS: 0 days :    Chief Complaint:      Cough and shortness of breath.    Subjective:    Patient was seen and examined at bedside.  Daughter and son at bedside.  Patient was laying comfortably in bed and unlabored on breathing.  Patient continues to have cough and wheezing and reports that her respiratory status has improved however not back to baseline yet.  Patient was titrated down to 4 L of oxygen.  Other vitals stable.  Patient denies any other complaints.  Patient became tearful when we were discussing about CODE STATUS as it was not ordered on her file previously.  Patient confirmed that her CODE STATUS is full.    Hospital Course:    Ms. Dudley is a 77-year-old female with history of atrial fibrillation on anticoagulation, COPD on home oxygen at 3 L, coronary artery disease, GERD, osteoarthritis was brought to the emergency room by EMS to the emergency room with symptoms of cough and increased shortness of breath.  Patient states that she was in the emergency room on 2022 with similar complaints and was subsequently discharged home on Augmentin, azithromycin and prednisone.  She completed the course but never felt better and subsequently started having significant shortness of breath.  In the last couple days, patient has been progressively dyspneic and has been having trouble to walk to the bathroom and back.  Patient became significantly distressed today when she was in the bathroom and had called her daughter to help her.  She was subsequently was brought to the emergency room by EMS.  She denies any chest pain.  No prior history of coronary artery disease or CHF.  She follows up with Dr. Poe who is her pulmonologist.   Patient has a BiPAP at home but unfortunately she has not been compliant.  She states that she quit smoking 2 years ago.  Her daughter lives with her but her daughter works during the day.     In the emergency room, she was afebrile but tachycardic at 116, with initial blood pressure of 170/105.  She was hypoxic when EMS saw her and they had to put her on 6 L of nasal cannula oxygen and she came in with saturations of 98% on the 6 L.  Blood work including CMP and CBC was unremarkable except for a glucose of 153, WBC of 12 and a hemoglobin of 9.8.  Troponin T, proBNP, lactic acid, lipase and procalcitonin levels were within normal limits.  D-dimer level was also within normal limits.  Respiratory panel including COVID-19 was within normal limits.  Patient recently had an echocardiogram on 11/2/2022 which showed normal left ventricular ejection fraction of 60% with normal diastolic filling without any significant valvular abnormalities.  Right ventricular size and systolic function was also normal.  Chest x-ray done in the emergency room showed emphysema with possible bronchitis no focal consolidation was noted.  Blood cultures were drawn in the emergency room.  Patient was given bronchodilators, IV magnesium, IV Solu-Medrol as well as a dose of ceftriaxone in the emergency room and is currently being admitted to the medical floor with telemetry.    Review of Systems:     All systems were reviewed and negative except as mentioned in subjective, assessment and plan.    Vital Signs:    Temp:  [98.5 °F (36.9 °C)-99.5 °F (37.5 °C)] 98.5 °F (36.9 °C)  Heart Rate:  [] 103  Resp:  [18-22] 18  BP: (129-183)/() 129/75    Intake and output:    I/O last 3 completed shifts:  In: 50 [I.V.:50]  Out: 300 [Urine:300]  No intake/output data recorded.    Physical Examination:    General Appearance:  Alert and cooperative.  No acute distress.   Head:  Atraumatic and normocephalic.   Eyes: Conjunctivae and sclerae normal, no  icterus. No pallor.   Throat: No oral lesions, no thrush, oral mucosa moist.   Neck: Supple, trachea midline, no thyromegaly.   Lungs:   Breath sounds heard bilaterally equally.  Decreased air movement bilaterally.  Mild expiratory wheezing.  Prolonged expiration.   Heart:  Normal S1 and S2, no murmur, no gallop, no rub. No JVD.   Abdomen:   Normal bowel sounds, no masses, no organomegaly. Soft, nontender, nondistended, no rebound tenderness.   Extremities: Supple, no edema, no cyanosis, no clubbing.   Skin: No bleeding or rash.   Neurologic: Alert and oriented x 3. No facial asymmetry. Moves all four limbs. No tremors.      Laboratory results:    Results from last 7 days   Lab Units 11/21/22  1019   SODIUM mmol/L 137   POTASSIUM mmol/L 4.1   CHLORIDE mmol/L 96*   CO2 mmol/L 32.3*   BUN mg/dL 19   CREATININE mg/dL 0.64   CALCIUM mg/dL 9.1   BILIRUBIN mg/dL 0.2   ALK PHOS U/L 69   ALT (SGPT) U/L 13   AST (SGOT) U/L 17   GLUCOSE mg/dL 153*     Results from last 7 days   Lab Units 11/21/22  1019   WBC 10*3/mm3 12.33*   HEMOGLOBIN g/dL 9.8*   HEMATOCRIT % 33.8*   PLATELETS 10*3/mm3 256         Results from last 7 days   Lab Units 11/21/22  1019   TROPONIN T ng/mL <0.010     Results from last 7 days   Lab Units 11/21/22  1310 11/21/22  1305   BLOODCX  No growth at less than 24 hours No growth at less than 24 hours     Recent Labs     03/17/22  1525 11/21/22  1027   PHART 7.393 7.443   BZN1NTV 59.8* 47.0*   PO2ART 51.7* 68.9*   DIK6PQN 36.4* 32.1*   BASEEXCESS 9.5* 7.1*      I have reviewed the patient's laboratory results.    Radiology results:    XR Chest 1 View    Result Date: 11/21/2022  CLINICAL INDICATION:  SOA Triage Protocol shortness of breath.  EXAMINATION TECHNIQUE: XR CHEST 1 VW-  COMPARISON: Radiographs 11/01/2022.  FINDINGS: No dense focal airspace consolidation. Emphysema. Bibasilar linear and reticular opacities. No pneumothorax or pleural effusion. Mild bilateral perihilar fullness/prominent pulmonary  arteries. Mild bronchial wall thickening. Heart is normal in size. Aortic atherosclerosis and tortuosity. No acute osseous or soft tissue abnormality. Degenerative changes of the thoracic spine and bilateral shoulder joints. No free air under hemidiaphragms.      Impression: Emphysema. Possible bronchitis. No dense focal airspace consolidation.    Images personally reviewed, interpreted and dictated by SHAMIKA Sanchez.       This report was signed and finalized on 11/21/2022 11:05 AM by SHAMIKA Sanchez.    I have reviewed the patient's radiology reports.    Medication Review:     I have reviewed the patient's active and prn medications.     Problem List:      COPD exacerbation (HCC)    Essential hypertension    Paroxysmal atrial fibrillation (HCC)    Gastroesophageal reflux disease without esophagitis    Overweight (BMI 25.0-29.9)    Acute on chronic respiratory failure with hypoxia (HCC)      Assessment:    1. Acute on chronic hypoxic respiratory failure, POA.  2. Acute COPD exacerbation, POA.  3. Paroxysmal atrial fibrillation on apixaban.  4. Essential hypertension.  5. Chronic anemia.    Plan:      Respiratory failure/COPD exacerbation.   - Patient will be continued on nasal cannula oxygen, she was titrated down to 4 L and we will continue to down titrate to her baseline levels as tolerated.  Patient on 2 L at all times at baseline.  - Continue bronchodilators, budesonide, IV Solu-Medrol and mucolytic agents.  We will taper steroids down as she improves.  - Continue BiPAP therapy at night and I have strongly advised her to be compliant with her home BiPAP regimen.  - Fortunately she is not a current smoker and declined nicotine patch.  - Procalcitonin negative, no indication for antibiotics at this time, patient has finished antibiotics as an outpatient prior to admission.   - D-dimer negative     Essential hypertension/paroxysmal atrial fibrillation.  - Continue home medications including  apixaban.     Consulted physical and occupational therapy.   Continue home meds as warranted.  Further orders as indicated per clinical course.    DVT Prophylaxis: Apixaban  Code Status: Full  Diet: Cardiac  Discharge Plan: Likely home in 2 to 3 days    Tom Keyes MD  11/22/22  07:45 EST    Dictated utilizing Dragon dictation.

## 2022-11-22 NOTE — PLAN OF CARE
Goal Outcome Evaluation:  Plan of Care Reviewed With: patient        Progress: no change  Outcome Evaluation: Admitted from ER. VSS. O2 down to 3L from 6L. Will continue to monitor.

## 2022-11-22 NOTE — THERAPY EVALUATION
Patient Name: Gabi Dudley  : 1945    MRN: [id number]                              Today's Date: 2022       Admit Date: 2022    Visit Dx:     ICD-10-CM ICD-9-CM   1. COPD exacerbation (HCC)  J44.1 491.21   2. Bronchitis  J40 490   3. Acute on chronic respiratory failure with hypoxia (HCC)  J96.21 518.84     799.02     Patient Active Problem List   Diagnosis   • Adrenal adenoma   • Anxiety   • Chronic fatigue   • Fibromyalgia   • Hyperlipidemia   • Essential hypertension   • Insomnia   • Osteoarthritis of knee   • Osteoporosis   • Pulmonary emphysema (HCC)   • Simple renal cyst   • Tension headache   • Vitamin D deficiency   • Diverticulosis   • Inversion of nipple   • Paroxysmal atrial fibrillation (HCC)   • Coronary artery disease involving native coronary artery of native heart without angina pectoris   • Autonomic dysfunction   • Gastroesophageal reflux disease without esophagitis   • Urge incontinence   • Erythrasma   • Compression fracture of T5 vertebra (HCC)   • Lung nodule   • COPD exacerbation (HCC)   • Overweight (BMI 25.0-29.9)   • Acute on chronic respiratory failure with hypoxia (HCC)     Past Medical History:   Diagnosis Date   • Adrenal adenoma    • Anemia    • Arrhythmia    • Asthma    • Atrial fibrillation (HCC)    • Back pain    • Benign colonic polyp    • Benign tumor of adrenal gland    • Cataract     bilateral   • Cholelithiasis    • Chronic bronchitis (HCC)    • Chronic bronchitis with COPD (chronic obstructive pulmonary disease) (HCC)    • COPD (chronic obstructive pulmonary disease) (HCC)    • Coronary artery disease    • Depression    • Elevated cholesterol    • Environmental and seasonal allergies    • Fibromyalgia    • Gastritis    • Generalized anxiety disorder    • GERD (gastroesophageal reflux disease)    • H/O mammogram    • Hemorrhoids    • History of blood transfusion    • History of blood transfusion    • History of echocardiogram    • History of  endometriosis    • History of nuclear stress test    • Hypertension    • IBS (irritable bowel syndrome)    • Impaired functional mobility, balance, gait, and endurance    • Inverted nipple    • Kidney disease    • Kidney stone    • Liver cyst    • Nodular radiologic density    • Nodule of left lung    • On home oxygen therapy     2 liters NC QHS   • Osteoarthritis    • Osteoporosis    • PONV (postoperative nausea and vomiting)    • Renal cyst    • Sinus problem     2014   • Sinusitis    • Skin cancer     basal cell carcinoma   • SOB (shortness of breath)    • Tobacco use    • Urinary frequency    • Vitamin D deficiency    • Wears glasses    • Wears partial dentures     upper plate     Past Surgical History:   Procedure Laterality Date   • APPENDECTOMY  1980s   • CARDIAC CATHETERIZATION  2003   • CATARACT EXTRACTION  2013    both eyes   • CHOLECYSTECTOMY WITH INTRAOPERATIVE CHOLANGIOGRAM N/A 10/30/2020    Procedure: CHOLECYSTECTOMY LAPAROSCOPIC INTRAOPERATIVE CHOLANGIOGRAPHY;  Surgeon: Sima Moses MD;  Location: [facility] OR;  Service: General;  Laterality: N/A;   • COLONOSCOPY  03/11/2013   • COLONOSCOPY  06/21/2016   • COLONOSCOPY N/A 7/24/2019    Procedure: COLONOSCOPY W/ COLD FORCEP POLYPECTOMIES; HOT SNARE POLYPECTOMIES; COLD SNARE POLYPECTOMY;  Surgeon: Goyo Nunez MD;  Location: [facility] ENDOSCOPY;  Service: Gastroenterology   • COLONOSCOPY W/ BIOPSIES AND POLYPECTOMY     • EXPLORATORY LAPAROTOMY N/A 11/23/2020    Procedure: colectomy, right, closure of enterotomy x 2, reduction of internal volvulus;  Surgeon: Sima Moses MD;  Location: [facility] OR;  Service: General;  Laterality: N/A;   • HYSTERECTOMY  1980s    partial   • LYSIS OF ABDOMINAL ADHESIONS     • TONSILLECTOMY  1987   • UPPER GASTROINTESTINAL ENDOSCOPY  01/13/2015   • VAGINAL DELIVERY      x2      General Information     Row Name 11/22/22 1400          OT Time and Intention    Document Type evaluation  -AH     Mode of Treatment occupational  therapy  -[facility] Name 11/22/22 1400          General Information    Patient Profile Reviewed yes  -     Prior Level of Function min assist:;ADL's;all household mobility  -     Existing Precautions/Restrictions oxygen therapy device and L/min;fall  -     Barriers to Rehab previous functional deficit;medically complex  -[facility] Name 11/22/22 1400          Occupational Profile    Reason for Services/Referral (Occupational Profile) ADL decline  -[facility] Name 11/22/22 1400          Living Environment    People in Home child(ashely), adult  -[facility] Name 11/22/22 1400          Home Main Entrance    Number of Stairs, Main Entrance three  -     Stair Railings, Main Entrance none  -[facility] Name 11/22/22 1400          Stairs Within Home, Primary    Stairs, Within Home, Primary 16 steps to the level Eileen lives on, railing on L side  -     Number of Stairs, Within Home, Primary other (see comments)  -     Stair Railings, Within Home, Primary railing on left side (ascending)  -[facility] Name 11/22/22 1400          Cognition    Orientation Status (Cognition) oriented x 4  -[facility] Name 11/22/22 1400          Safety Issues, Functional Mobility    Safety Issues Affecting Function (Mobility) impulsivity;insight into deficits/self-awareness;safety precaution awareness;safety precautions follow-through/compliance  -     Impairments Affecting Function (Mobility) endurance/activity tolerance;strength;shortness of breath;pain  -           User Key  (r) = Recorded By, (t) = Taken By, (c) = Cosigned By    Initials Name Provider Type     Clarita Ritter Occupational Therapist                 Mobility/ADL's     [facility] Name 11/22/22 1405          Bed Mobility    Bed Mobility bed mobility (all) activities  -     All Activities, [place] (Bed Mobility) modified independence  -     Assistive Device (Bed Mobility) head of bed elevated  -[facility] Name 11/22/22 1400          Transfers    Transfers sit-stand  transfer  -[facility] Name 11/22/22 1408          Sit-Stand Transfer    Sit-Stand [place] (Transfers) contact guard  -     Assistive Device (Sit-Stand Transfers) walker, front-wheeled  -[facility] Name 11/22/22 1408          Functional Mobility    Functional Mobility- Ind. Level contact guard assist  -     Functional Mobility- Device walker, front-wheeled  -     Functional Mobility-Distance (Feet) 30  -     Functional Mobility- Safety Issues supplemental O2  -     Functional Mobility- Comment pt noted to get soa with minimal activity  -[facility] Name 11/22/22 1408          Activities of Daily Living    BADL Assessment/Intervention bathing;upper body dressing;lower body dressing;grooming;feeding;toileting  -[facility] Name 11/22/22 1408          Bathing Assessment/Intervention    [place] Level (Bathing) minimum assist (75% patient effort)  -[facility] Name 11/22/22 1408          Upper Body Dressing Assessment/Training    [place] Level (Upper Body Dressing) independent  -AH     Row Name 11/22/22 1408          Lower Body Dressing Assessment/Training    [place] Level (Lower Body Dressing) set up  -AH     Row Name 11/22/22 1408          Grooming Assessment/Training    [place] Level (Grooming) set up  -AH     Row Name 11/22/22 1408          Self-Feeding Assessment/Training    [place] Level (Feeding) independent  -AH     Row Name 11/22/22 1408          Toileting Assessment/Training    [place] Level (Toileting) standby assist  -           User Key  (r) = Recorded By, (t) = Taken By, (c) = Cosigned By    Initials Name Provider Type     Clarita Ritter Occupational Therapist               Obj/Interventions     [facility] Name 11/22/22 1413          Sensory Assessment (Somatosensory)    Sensory Assessment (Somatosensory) sensation intact  -[facility] Name 11/22/22 1413          Vision Assessment/Intervention    Visual Impairment/Limitations WFL;corrective lenses full-time  -[facility]  Name 11/22/22 1413          Range of Motion Comprehensive    General Range of Motion bilateral upper extremity ROM WFL  -     Row Name 11/22/22 1413          Strength Comprehensive (MMT)    Comment, General Manual Muscle Testing (MMT) Assessment BUE 4-/5  -           User Key  (r) = Recorded By, (t) = Taken By, (c) = Cosigned By    Initials Name Provider Type     Clarita Ritter Occupational Therapist               Goals/Plan     Row Name 11/22/22 1419          Transfer Goal 1 (OT)    Activity/Assistive Device (Transfer Goal 1, OT) sit-to-stand/stand-to-sit;walker, rolling  -     [place] Level/Cues Needed (Transfer Goal 1, OT) standby assist  -     Time Frame (Transfer Goal 1, OT) by discharge  -     Progress/Outcome (Transfer Goal 1, OT) goal ongoing  -     Row Name 11/22/22 1419          Bathing Goal 1 (OT)    Activity/Device (Bathing Goal 1, OT) bathing skills, all  -     [place] Level/Cues Needed (Bathing Goal 1, OT) set-up required  -     Time Frame (Bathing Goal 1, OT) long term goal (LTG);1 week  -     Progress/Outcomes (Bathing Goal 1, OT) goal ongoing  -     Row Name 11/22/22 1419          Dressing Goal 1 (OT)    Activity/Device (Dressing Goal 1, OT) lower body dressing  -     [place]/Cues Needed (Dressing Goal 1, OT) standby assist  -     Time Frame (Dressing Goal 1, OT) by discharge  -     Progress/Outcome (Dressing Goal 1, OT) goal ongoing  -[facility] Name 11/22/22 1419          Strength Goal 1 (OT)    Strength Goal 1 (OT) Pt will tolerate UB strengthening ex using theraband for resistance.  -     Time Frame (Strength Goal 1, OT) by discharge  -     Progress/Outcome (Strength Goal 1, OT) goal ongoing  -[facility] Name 11/22/22 1419          Problem Specific Goal 1 (OT)    Problem Specific Goal 1 (OT) Pt will tolerate standing to perform grooming tasks at sink.  -     Time Frame (Problem Specific Goal 1, OT) by discharge  -     Progress/Outcome (Problem  Specific Goal 1, OT) goal ongoing  -[facility] Name 11/22/22 1419          Therapy Assessment/Plan (OT)    Planned Therapy Interventions (OT) activity tolerance training;BADL retraining;patient/caregiver education/training;transfer/mobility retraining;strengthening exercise  -           User Key  (r) = Recorded By, (t) = Taken By, (c) = Cosigned By    Initials Name Provider Type     Clarita Ritter Occupational Therapist               Clinical Impression     [facility] Name 11/22/22 1413          Pain Assessment    Pretreatment Pain Rating 6/10  -     Posttreatment Pain Rating 6/10  -     Pain Location generalized  -     Pain Intervention(s) Repositioned;Ambulation/increased activity  -[facility] Name 11/22/22 1413          Plan of Care Review    Plan of Care Reviewed With patient;daughter  -     Progress no change  -     Outcome Evaluation Pt seen for OT evaluation today.  Pt presents with weakness, decreased endurance and decreased independence with self care and functional mobility tasks.  Pt on 5L during evaluation today.  Pt noted to be somewhat impulsive with activity, cues to slow down her pace.  Pt walked 30' with RW, had to return to her bed d/t decreased endurance and soa.  Pt is expected to benefit from skilled OT to improve her activity tolerance and independence with ADL tasks.  -[facility] Name 11/22/22 1413          Therapy Assessment/Plan (OT)    Patient/Family Therapy Goal Statement (OT) d/c home  -     Rehab Potential (OT) good, to achieve stated therapy goals  -     Criteria for Skilled Therapeutic Interventions Met (OT) yes;skilled treatment is necessary  -     Therapy Frequency (OT) 3 times/wk  -[facility] Name 11/22/22 1413          Therapy Plan Review/Discharge Plan (OT)    Anticipated Discharge Disposition (OT) home with home health  -[facility] Name 11/22/22 1413          Vital Signs    Pretreatment Heart Rate (beats/min) 116  -     Intratreatment Heart Rate (beats/min) 123   -     Posttreatment Heart Rate (beats/min) 111  -AH     Pre SpO2 (%) 98  -AH     O2 Delivery Pre Treatment supplemental O2  5L  -AH     Intra SpO2 (%) 91  -AH     O2 Delivery Intra Treatment supplemental O2  -AH     Post SpO2 (%) 98  -AH     O2 Delivery Post Treatment supplemental O2  -AH     Row Name 11/22/22 1413          Positioning and Restraints    Pre-Treatment Position in bed  -     Post Treatment Position bed  -AH     In Bed sitting EOB;call light within reach;encouraged to call for assist;with family/caregiver  -           User Key  (r) = Recorded By, (t) = Taken By, (c) = Cosigned By    Initials Name Provider Type    Clarita River Occupational Therapist               Outcome Measures     Row Name 11/22/22 1422          How much help from another is currently needed...    Putting on and taking off regular lower body clothing? 3  -AH     Bathing (including washing, rinsing, and drying) 3  -AH     Toileting (which includes using toilet bed pan or urinal) 3  -AH     Putting on and taking off regular upper body clothing 4  -AH     Taking care of personal grooming (such as brushing teeth) 4  -AH     Eating meals 4  -     AM-[facility] 6 Clicks Score (OT) 21  -     Row Name 11/22/22 0800          How much help from another person do you currently need...    Turning from your back to your side while in flat bed without using bedrails? 4  -LA     Moving from lying on back to sitting on the side of a flat bed without bedrails? 3  -LA     Moving to and from a bed to a chair (including a wheelchair)? 4  -LA     Standing up from a chair using your arms (e.g., wheelchair, bedside chair)? 4  -LA     Climbing 3-5 steps with a railing? 3  -LA     To walk in hospital room? 3  -LA     AM-[facility] 6 Clicks Score (PT) 21  -LA     Highest level of mobility 6 --> Walked 10 steps or more  -LA     Row Name 11/22/22 1422          Functional Assessment    Outcome Measure Options AM-[facility]AC 6 Clicks Daily Activity (OT)  -            User Key  (r) = Recorded By, (t) = Taken By, (c) = Cosigned By    Initials Name Provider Type     Clarita Ritter Occupational Therapist    Harry Lewis RN Registered Nurse                Occupational Therapy Education     Title: PT OT SLP Therapies (In Progress)     Topic: Occupational Therapy (In Progress)     Point: ADL training (Done)     Description:   Instruct learner(s) on proper safety adaptation and remediation techniques during self care or transfers.   Instruct in proper use of assistive devices.              Learning Progress Summary           Patient Acceptance, E,TB, VU by  at 11/22/2022 8020    Comment: Role of OT/POC                   Point: Home exercise program (Not Started)     Description:   Instruct learner(s) on appropriate technique for monitoring, assisting and/or progressing therapeutic exercises/activities.              Learner Progress:  Not documented in this visit.          Point: Precautions (Not Started)     Description:   Instruct learner(s) on prescribed precautions during self-care and functional transfers.              Learner Progress:  Not documented in this visit.          Point: Body mechanics (Not Started)     Description:   Instruct learner(s) on proper positioning and spine alignment during self-care, functional mobility activities and/or exercises.              Learner Progress:  Not documented in this visit.                      User Key     Initials Effective Dates Name Provider Type Discipline     06/16/21 -  Clarita Ritter Occupational Therapist OT              OT Recommendation and Plan  Planned Therapy Interventions (OT): activity tolerance training, BADL retraining, patient/caregiver education/training, transfer/mobility retraining, strengthening exercise  Therapy Frequency (OT): 3 times/wk  Plan of Care Review  Plan of Care Reviewed With: patient, daughter  Progress: no change  Outcome Evaluation: Pt seen for OT evaluation today.  Pt presents with weakness,  decreased endurance and decreased independence with self care and functional mobility tasks.  Pt on 5L during evaluation today.  Pt noted to be somewhat impulsive with activity, cues to slow down her pace.  Pt walked 30' with RW, had to return to her bed d/t decreased endurance and soa.  Pt is expected to benefit from skilled OT to improve her activity tolerance and independence with ADL tasks.     Time Calculation:    Time Calculation- OT     Row Name 11/22/22 1424             Time Calculation- OT    OT Start Time 1338  -      OT Received On 11/22/22  -      OT Goal Re-Cert Due Date 12/02/22  -         Untimed Charges    OT Eval/Re-eval Minutes 40  -AH         Total Minutes    Untimed Charges Total Minutes 40  -AH       Total Minutes 40  -AH            User Key  (r) = Recorded By, (t) = Taken By, (c) = Cosigned By    Initials Name Provider Type    Clarita River Occupational Therapist              Therapy Charges for Today     Code Description Service Date Service Provider Modifiers Qty    76882702539 HC OT EVAL LOW COMPLEXITY 3 11/22/2022 Clarita Ritter GO 1               Clarita Ritter  11/22/2022

## 2022-11-22 NOTE — CASE MANAGEMENT/SOCIAL WORK
Continued Stay Note  FRANCIS Jean     Patient Name: Gabi Dudley  MRN: [id number]  Today's Date: 11/22/2022    Admit Date: 11/21/2022        Discharge Plan     Row Name 11/22/22 1544       Plan    Final Note Spoke to [facility] Confirmed they are her DME original order is for 2 LNC continuously .Will need new studies and order for greater than that oxygen requirements  Inquired about a POC  will need the same new orders and studies for this if pt is on 4- 5 Lnc will not qualify for POC               Discharge Codes    No documentation.                     Pamela Dubon RN

## 2022-11-22 NOTE — PLAN OF CARE
Goal Outcome Evaluation:  Plan of Care Reviewed With: patient, daughter        Progress: no change  Outcome Evaluation: Pt participated in PT eval this date. Pt presents with deficits in strength, gait and activity tolerance. Pt transferred to EOB on 5L O2 with MI. Pt very quick to engage and complete mobility due to SOA. Pt walked 30 ft with Rwx with cues to slow pace and CGA. Pt's O2 sats 91% after activity. Pt is expected to benefit from skilled PTx during this inpatient stay to address deficits in strength, gait and mobility.

## 2022-11-22 NOTE — CONSULTS
"Adult Nutrition  Assessment/PES    Patient Name:  Gabi Dudley  YOB: 1945  MRN: [id number]  Admit Date:  11/21/2022    Assessment Date:  11/22/2022    Comments:    Pt normal wt for age - overweight diet education not appropriate.     1. Continue current diet order as medically appropriate.   2. Encourage PO intake to meet 50% of estimated needs.     RD available PRN.      Reason for Assessment     Row Name 11/22/22 1025          Reason for Assessment    Reason For Assessment identified at risk by screening criteria;per organizational policy                  Labs/Tests/Procedures/Meds     Row Name 11/22/22 1026          Labs/Procedures/Meds    Lab Results Reviewed reviewed, pertinent     Lab Results Comments High: glucose        Medications    Pertinent Medications Reviewed reviewed, pertinent     Pertinent Medications Comments eliquis, lipitor, methylprednisolone, protonix                Physical Findings     Row Name 11/22/22 1026          Physical Findings    Overall Physical Appearance normal wt for age                Estimated/Assessed Needs - Anthropometrics     Row Name 11/22/22 1028 11/22/22 1027       Anthropometrics    Height 165.1 cm (65\") --    Age for Calculations -- 77    Height for Calculation -- 1.651 m (5' 5\")    Weight for Calculation -- 73.9 kg (163 lb)       Estimated/Assessed Needs    Additional Documentation -- Estimated Calorie Needs (Group);Carroll-St. Jeor Equation (Group);Protein Requirements (Group);Fluid Requirements (Group)       Estimated Calorie Needs    Estimated Calorie Requirement (kcal/day) -- 1729       Carroll-St. Jeor Equation    RMR (Carroll-St. Jeor Equation) -- 1235.235    Carroll-St. Jeor Activity Factors -- 1.4 - 1.5    Activity Factors (Carroll-St. Jeor) -- 0429.329 - 1852.8587       Protein Requirements    Weight Used For Protein Calculations -- 73.9 kg (163 lb)    Est Protein Requirement Amount (gms/kg) -- 1.0 gm protein    Estimated Protein Requirements " "(gms/day) -- 73.94       Fluid Requirements    Fluid Requirements (mL/day) -- 1729  1mL/kcal    RDA Method (mL) -- 1729    Row Name 11/22/22 0343          Anthropometrics    Weight 74.2 kg (163 lb 9.3 oz)                Nutrition Prescription Ordered     Row Name 11/22/22 1028          Nutrition Prescription PO    Current PO Diet Regular     Fluid Consistency Thin     Common Modifiers Cardiac                Evaluation of Received Nutrient/Fluid Intake     Row Name 11/22/22 1028          Intake Assessment    Energy/Calorie Requirement Assessment meeting needs     Protein Requirement Assessment meeting needs        PO Evaluation    Number of Days PO Intake Evaluated 1 day     Number of Meals 1     % PO Intake 50        Vitals    Height 165.1 cm (65\")                   Problem/Interventions:   Problem 1     Row Name 11/22/22 1029          Nutrition Diagnoses Problem 1    Problem 1 No Nutrition Diagnosis at this Time                      Intervention Goal     Row Name 11/22/22 1029          Intervention Goal    General Maintain nutrition;Improved nutrition related lab(s);Reduce/improve symptoms;Meet nutritional needs for age/condition;Disease management/therapy     PO Establish PO;Tolerate PO;Increase intake;Maintain intake;Continue positive trend;Meet estimated needs     Weight Maintain weight                Nutrition Intervention     Row Name 11/22/22 1029          Nutrition Intervention    RD/Tech Action Follow Tx progress;Care plan reviewd;Await begin PO;Encourage intake                Nutrition Prescription     Row Name 11/22/22 1029          Nutrition Prescription PO    New PO Prescription Ordered? No, recommended        Other Orders    Obtain Weight Daily     Obtain Weight Ordered? No, recommended     Supplement Vitamin mineral supplement     Supplement Ordered? No, recommended                Education/Evaluation     Row Name 11/22/22 1029          Education    Education Will Instruct as appropriate        " Monitor/Evaluation    Monitor Per protocol;PO intake;Pertinent labs;Weight;Skin status;Symptoms                 Electronically signed by:  Sergey Arredondo RD  11/22/22 10:31 EST

## 2022-11-22 NOTE — PLAN OF CARE
Goal Outcome Evaluation:  Plan of Care Reviewed With: patient, daughter        Progress: no change  Outcome Evaluation: Pt seen for OT evaluation today.  Pt presents with weakness, decreased endurance and decreased independence with self care and functional mobility tasks.  Pt on 5L during evaluation today.  Pt noted to be somewhat impulsive with activity, cues to slow down her pace.  Pt walked 30' with RW, had to return to her bed d/t decreased endurance and soa.  Pt is expected to benefit from skilled OT to improve her activity tolerance and independence with ADL tasks.

## 2022-11-22 NOTE — CASE MANAGEMENT/SOCIAL WORK
Discharge Planning Assessment  [facility]     Patient Name: Gabi Dudley  MRN: [id number]  Today's Date: 2022    Admit Date: 2022    Plan: Cm spoke with pt and daughterMalu at bedside to complete DCP. Pt confirms her name, , phone nubmer and address. She states she is tired and requests her daughter finish discussing DCp. Pt PCP is Dr Quinteros and she uses [facility]. Pt has lived with daughter for 11 yrs. Dtr assists with bathing, transportation, meal prep chores and shopping. Pt uses rolling walker at baseline and requires oxygen 2L continuous provided by [employer]. (order confirmed). Daughter states they have at POC for home and use portable tanks they find cumbersome to transport due to pt using walker. They request pt be ordere POC (portable) at discharge. She states they usually pack all 6 portable tanks when they travel as they are unsure how many pt will need and dont want to run out. Daughter agrees to bring portable tank at [place] to transport her home. Dtr and pt verbalize consers about expense of inhaler ($400) and Eliquis but have been receiving samples. Plan is for pt to return home at [place]. Cm contact card left at bedside.   Discharge Needs Assessment     Row Name 22 1608       Discharge Needs Assessment    Equipment Currently Used at Home bipap;shower chair;oxygen;nebulizer;pulse ox    Concerns to be Addressed discharge planning    Row Name 22 1600       Living Environment    People in Home child(ashely), adult;grandchild(ashely)    Name(s) of People in Home mello Toribior and 10 yo grand daughter    Current Living Arrangements home    Duration at Residence 12 yrs    Potentially Unsafe Housing Conditions unable to assess    Primary Care Provided by other (see comments)  dtr-Malu    Provides Primary Care For no one, unable/limited ability to care for self    Family Caregiver if Needed child(ashely), adult    Family Caregiver Names Malu    Quality of Family Relationships  helpful;involved    Able to Return to Prior Arrangements yes       Resource/Environmental Concerns    Transportation Concerns none       Transition Planning    Patient/Family Anticipates Transition to home with family    Patient/Family Anticipated Services at Transition other (see comments)  TBD by clinical course    Transportation Anticipated family or friend will provide       Discharge Needs Assessment    Readmission Within the Last 30 Days no previous admission in last 30 days    Equipment Currently Used at Home walker, rolling;oxygen;pulse ox;bipap    Concerns to be Addressed discharge planning    Concerns Comments pt requesting small POC rather than small portable tanks due to hx back fx and difficulty transporting numerous tanks.    Anticipated Changes Related to Illness none    Equipment Needed After Discharge none    Provided Post Acute Provider List? N/A    Provided Post Acute Provider Quality & Resource List? N/A    Current Discharge Risk physical impairment               Discharge Plan     Row Name 22 1609       Plan    Plan Cm spoke with pt and daughterMalu at bedside to complete DCP. Pt confirms her name, , phone nubmer and address. She states she is tired and requests her daughter finish discussing DCp. Pt PCP is Dr Quinteros and she uses [facility]. Pt has lived with daughter for 11 yrs. Dtr assists with bathing, transportation, meal prep chores and shopping. Pt uses rolling walker at baseline and requires oxygen 2L continuous provided by [employer]. (order confirmed). Daughter states they have at POC for home and use portable tanks they find cumbersome to transport due to pt using walker. They request pt be ordere POC (portable) at discharge. She states they usually pack all 6 portable tanks when they travel as they are unsure how many pt will need and dont want to run out. Daughter agrees to bring portable tank at [place] to transport her home. Dtr and pt verbalize consers about expense of  inhaler ($400) and Eliquis but have been receiving samples. Plan is for pt to return home at [place]. Cm contact card left at bedside.    Final Discharge Disposition Code 01 - home or self-care    Row Name 11/22/22 1544       Plan    Final Note Spoke to [facility] Confirmed they are her DME orginal order is for 2 LNC continously .Will need new studies and order for greater than that oxygen requirmets Inquired aboiut a POC  will need the same new orders and studies for thsi if pt is on 4- 5 Lnc will not qualify for POC              Continued Care and Services - Admitted Since 11/21/2022     Durable Medical Equipment     Service Provider Request Status Selected Services Address Phone Fax Patient Preferred    [facility] [place]  Selected Durable Medical Equipment [address], [place] [address] [phone] --       Internal Comment last updated by Malu Holden, APRN 11/22/2022 1608    Oxygen order verified for 2L cont.                        Selected Continued Care - Episodes Includes continued care and service providers with selected services from the active episodes listed below    High Risk Care Management Episode start date: 11/2/2022   There are no active outsourced providers for this episode.                  Demographic Summary     Row Name 11/22/22 1546       General Information    Admission Type inpatient    Arrived From emergency department    Required Notices Provided Important Message from Medicare    Referral Source admission list    Reason for Consult discharge planning    Preferred Language English       Contact Information    Permission Granted to Share Info With family/designee    Contact Information Obtained for     Contact Information Comments Malu Aguilar- dtr- [phone]               Functional Status     Row Name 11/22/22 1559       Functional Status, IADL    Medications assistive person    Meal Preparation assistive person    Housekeeping assistive person     Laundry assistive person    Shopping assistive person    IADL Comments mellor, Malu quinn               Psychosocial    No documentation.                Abuse/Neglect    No documentation.                Legal    No documentation.                Substance Abuse    No documentation.                Patient Forms    No documentation.                   Malu Horan, APRN

## 2022-11-23 PROCEDURE — 94664 DEMO&/EVAL PT USE INHALER: CPT

## 2022-11-23 PROCEDURE — 94761 N-INVAS EAR/PLS OXIMETRY MLT: CPT

## 2022-11-23 PROCEDURE — 25010000002 METHYLPREDNISOLONE PER 40 MG: Performed by: INTERNAL MEDICINE

## 2022-11-23 PROCEDURE — 97530 THERAPEUTIC ACTIVITIES: CPT

## 2022-11-23 PROCEDURE — 97116 GAIT TRAINING THERAPY: CPT

## 2022-11-23 PROCEDURE — 94799 UNLISTED PULMONARY SVC/PX: CPT

## 2022-11-23 PROCEDURE — 97535 SELF CARE MNGMENT TRAINING: CPT

## 2022-11-23 PROCEDURE — 97110 THERAPEUTIC EXERCISES: CPT

## 2022-11-23 PROCEDURE — 99232 SBSQ HOSP IP/OBS MODERATE 35: CPT | Performed by: FAMILY MEDICINE

## 2022-11-23 RX ADMIN — CLONAZEPAM 1 MG: 0.5 TABLET ORAL at 15:07

## 2022-11-23 RX ADMIN — METHYLPREDNISOLONE SODIUM SUCCINATE 40 MG: 40 INJECTION, POWDER, FOR SOLUTION INTRAMUSCULAR; INTRAVENOUS at 21:11

## 2022-11-23 RX ADMIN — DULOXETINE HYDROCHLORIDE 60 MG: 30 CAPSULE, DELAYED RELEASE ORAL at 21:11

## 2022-11-23 RX ADMIN — Medication 10 ML: at 21:11

## 2022-11-23 RX ADMIN — IPRATROPIUM BROMIDE AND ALBUTEROL SULFATE 3 ML: 2.5; .5 SOLUTION RESPIRATORY (INHALATION) at 13:05

## 2022-11-23 RX ADMIN — TRAZODONE HYDROCHLORIDE 150 MG: 50 TABLET ORAL at 21:11

## 2022-11-23 RX ADMIN — IPRATROPIUM BROMIDE AND ALBUTEROL SULFATE 3 ML: 2.5; .5 SOLUTION RESPIRATORY (INHALATION) at 19:32

## 2022-11-23 RX ADMIN — CETIRIZINE HYDROCHLORIDE 10 MG: 10 TABLET, FILM COATED ORAL at 08:34

## 2022-11-23 RX ADMIN — IPRATROPIUM BROMIDE AND ALBUTEROL SULFATE 3 ML: 2.5; .5 SOLUTION RESPIRATORY (INHALATION) at 01:22

## 2022-11-23 RX ADMIN — OXYCODONE HYDROCHLORIDE AND ACETAMINOPHEN 1 TABLET: 7.5; 325 TABLET ORAL at 08:34

## 2022-11-23 RX ADMIN — SERTRALINE 100 MG: 50 TABLET, FILM COATED ORAL at 08:34

## 2022-11-23 RX ADMIN — OXYCODONE HYDROCHLORIDE AND ACETAMINOPHEN 1 TABLET: 7.5; 325 TABLET ORAL at 21:11

## 2022-11-23 RX ADMIN — GUAIFENESIN 600 MG: 600 TABLET ORAL at 08:34

## 2022-11-23 RX ADMIN — PANTOPRAZOLE SODIUM 40 MG: 40 TABLET, DELAYED RELEASE ORAL at 08:34

## 2022-11-23 RX ADMIN — GUAIFENESIN 600 MG: 600 TABLET ORAL at 21:11

## 2022-11-23 RX ADMIN — DULOXETINE HYDROCHLORIDE 60 MG: 30 CAPSULE, DELAYED RELEASE ORAL at 08:34

## 2022-11-23 RX ADMIN — Medication 10 ML: at 08:35

## 2022-11-23 RX ADMIN — APIXABAN 5 MG: 5 TABLET, FILM COATED ORAL at 21:11

## 2022-11-23 RX ADMIN — APIXABAN 5 MG: 5 TABLET, FILM COATED ORAL at 08:35

## 2022-11-23 RX ADMIN — METHYLPREDNISOLONE SODIUM SUCCINATE 40 MG: 40 INJECTION, POWDER, FOR SOLUTION INTRAMUSCULAR; INTRAVENOUS at 08:36

## 2022-11-23 RX ADMIN — ATORVASTATIN CALCIUM 20 MG: 20 TABLET, FILM COATED ORAL at 08:34

## 2022-11-23 RX ADMIN — IPRATROPIUM BROMIDE AND ALBUTEROL SULFATE 3 ML: 2.5; .5 SOLUTION RESPIRATORY (INHALATION) at 07:04

## 2022-11-23 NOTE — PLAN OF CARE
Goal Outcome Evaluation:  Plan of Care Reviewed With: patient, daughter        Progress: improving   Eileen is now on 2L NC. She ambulates with walker and stand-by assist. Anxiety and pain managed per MAR.

## 2022-11-23 NOTE — PLAN OF CARE
Problem: Adult Inpatient Plan of Care  Goal: Plan of Care Review  Recent Flowsheet Documentation  Taken 11/23/2022 1551 by Bebeto David, [facility]  Progress: improving  Plan of Care Reviewed With: patient  Outcome Evaluation: Pt recieved supine in bed and willing to particiapte with treatment. Pt was able to transfer to EOB  without assistance. Pt progressed gait distance to 40' and 60' cga/sba with rw.  Pt  did require seated rest between bouts of ambulation. Pt requires vc's for pacing and pace of breathing.  See flowsheet for details   Goal Outcome Evaluation:  Plan of Care Reviewed With: patient        Progress: improving

## 2022-11-23 NOTE — PLAN OF CARE
Goal Outcome Evaluation:  Plan of Care Reviewed With: patient        Progress: improving  Outcome Evaluation: Pt received sitting EOB. Pt agreeable to B UE exercise. Pt performed 10 reps of bicep curls each arm with 1 minute rest break in between sets due to fatigue and SOA. Pt performed 30 seconds of boxing in front of body with B UE for endurance and activity tolerance w/o rest break. Pt completed 10 reps of shoulder abd/add and elbow flex/ext with 1 minute rest break in between reps d/t fatigue. Pt SBA for sit to stand with rw and gait belt, ambulated 22' to commode and utilized grab bar and rw with CGA for toilet transfer. Pt CGA for toileting d/t impulsivity. Pt ambulated 22' back to bed with CGA and rw. Pt SBA for stand to sit and mod I to return to fowlers in bed. Pt continues to progress with POC and requires frequent rest breaks during session. Pt 02 consistently >95% throughout session.

## 2022-11-23 NOTE — THERAPY TREATMENT NOTE
Patient Name: Gabi Dudley  : 1945    MRN: [id number]                              Today's Date: 2022       Admit Date: 2022    Visit Dx:     ICD-10-CM ICD-9-CM   1. COPD exacerbation (HCC)  J44.1 491.21   2. Bronchitis  J40 490   3. Acute on chronic respiratory failure with hypoxia (HCC)  J96.21 518.84     799.02     Patient Active Problem List   Diagnosis   • Adrenal adenoma   • Anxiety   • Chronic fatigue   • Fibromyalgia   • Hyperlipidemia   • Essential hypertension   • Insomnia   • Osteoarthritis of knee   • Osteoporosis   • Pulmonary emphysema (HCC)   • Simple renal cyst   • Tension headache   • Vitamin D deficiency   • Diverticulosis   • Inversion of nipple   • Paroxysmal atrial fibrillation (HCC)   • Coronary artery disease involving native coronary artery of native heart without angina pectoris   • Autonomic dysfunction   • Gastroesophageal reflux disease without esophagitis   • Urge incontinence   • Erythrasma   • Compression fracture of T5 vertebra (HCC)   • Lung nodule   • COPD exacerbation (HCC)   • Overweight (BMI 25.0-29.9)   • Acute on chronic respiratory failure with hypoxia (HCC)     Past Medical History:   Diagnosis Date   • Adrenal adenoma    • Anemia    • Arrhythmia    • Asthma    • Atrial fibrillation (HCC)    • Back pain    • Benign colonic polyp    • Benign tumor of adrenal gland    • Cataract     bilateral   • Cholelithiasis    • Chronic bronchitis (HCC)    • Chronic bronchitis with COPD (chronic obstructive pulmonary disease) (HCC)    • COPD (chronic obstructive pulmonary disease) (HCC)    • Coronary artery disease    • Depression    • Elevated cholesterol    • Environmental and seasonal allergies    • Fibromyalgia    • Gastritis    • Generalized anxiety disorder    • GERD (gastroesophageal reflux disease)    • H/O mammogram    • Hemorrhoids    • History of blood transfusion    • History of blood transfusion    • History of echocardiogram    • History of  endometriosis    • History of nuclear stress test    • Hypertension    • IBS (irritable bowel syndrome)    • Impaired functional mobility, balance, gait, and endurance    • Inverted nipple    • Kidney disease    • Kidney stone    • Liver cyst    • Nodular radiologic density    • Nodule of left lung    • On home oxygen therapy     2 liters NC QHS   • Osteoarthritis    • Osteoporosis    • PONV (postoperative nausea and vomiting)    • Renal cyst    • Sinus problem     2014   • Sinusitis    • Skin cancer     basal cell carcinoma   • SOB (shortness of breath)    • Tobacco use    • Urinary frequency    • Vitamin D deficiency    • Wears glasses    • Wears partial dentures     upper plate     Past Surgical History:   Procedure Laterality Date   • APPENDECTOMY  1980s   • CARDIAC CATHETERIZATION  2003   • CATARACT EXTRACTION  2013    both eyes   • CHOLECYSTECTOMY WITH INTRAOPERATIVE CHOLANGIOGRAM N/A 10/30/2020    Procedure: CHOLECYSTECTOMY LAPAROSCOPIC INTRAOPERATIVE CHOLANGIOGRAPHY;  Surgeon: Sima Moses MD;  Location: [facility] OR;  Service: General;  Laterality: N/A;   • COLONOSCOPY  03/11/2013   • COLONOSCOPY  06/21/2016   • COLONOSCOPY N/A 7/24/2019    Procedure: COLONOSCOPY W/ COLD FORCEP POLYPECTOMIES; HOT SNARE POLYPECTOMIES; COLD SNARE POLYPECTOMY;  Surgeon: Goyo Nunez MD;  Location: [facility] ENDOSCOPY;  Service: Gastroenterology   • COLONOSCOPY W/ BIOPSIES AND POLYPECTOMY     • EXPLORATORY LAPAROTOMY N/A 11/23/2020    Procedure: colectomy, right, closure of enterotomy x 2, reduction of internal volvulus;  Surgeon: Sima Moses MD;  Location: [facility] OR;  Service: General;  Laterality: N/A;   • HYSTERECTOMY  1980s    partial   • LYSIS OF ABDOMINAL ADHESIONS     • TONSILLECTOMY  1987   • UPPER GASTROINTESTINAL ENDOSCOPY  01/13/2015   • VAGINAL DELIVERY      x2      General Information     Row Name 11/23/22 1604          OT Time and Intention    Document Type therapy note (daily note)  -SP     Mode of  Treatment occupational therapy  -SP     Row Name 11/23/22 1602          General Information    Patient Profile Reviewed yes  -SP     Existing Precautions/Restrictions oxygen therapy device and L/min;fall  -SP           User Key  (r) = Recorded By, (t) = Taken By, (c) = Cosigned By    Initials Name Provider Type    SP Zandra Cox OT Occupational Therapist                 Mobility/ADL's     Row Name 11/23/22 1602          Bed Mobility    Bed Mobility sit-supine  -SP     All Activities, [place] (Bed Mobility) modified independence  -SP     Assistive Device (Bed Mobility) head of bed elevated  -SP     Row Name 11/23/22 1602          Transfers    Transfers sit-stand transfer  -SP     Row Name 11/23/22 1602          Sit-Stand Transfer    Sit-Stand [place] (Transfers) standby assist;verbal cues  -SP     Assistive Device (Sit-Stand Transfers) walker, front-wheeled  -SP     Row Name 11/23/22 1602          Functional Mobility    Functional Mobility- Ind. Level contact guard assist  -SP     Functional Mobility- Device walker, front-wheeled;other (see comments)  gait belt  -SP     Functional Mobility-Distance (Feet) 24  -SP     Functional Mobility- Safety Issues supplemental O2  -SP     Row Name 11/23/22 1602          Activities of Daily Living    BADL Assessment/Intervention toileting  -SP     Row Name 11/23/22 1602          Toileting Assessment/Training    [place] Level (Toileting) contact guard assist;verbal cues  -SP     Assistive Devices (Toileting) commode;grab bar/safety frame  -SP     Position (Toileting) unsupported sitting  -SP           User Key  (r) = Recorded By, (t) = Taken By, (c) = Cosigned By    Initials Name Provider Type    SP Zandra Cox OT Occupational Therapist               Obj/Interventions     Row Name 11/23/22 1606          Shoulder (Therapeutic Exercise)    Shoulder (Therapeutic Exercise) strengthening exercise  -SP     Shoulder Strengthening (Therapeutic Exercise)  bilateral;flexion;extension;horizontal aBduction/aDduction;10 repetitions  -SP     Row Name 11/23/22 1606          Elbow/Forearm (Therapeutic Exercise)    Elbow/Forearm (Therapeutic Exercise) strengthening exercise  -SP     Elbow/Forearm Strengthening (Therapeutic Exercise) bilateral;10 repetitions  -SP     Row Name 11/23/22 1606          Motor Skills    Therapeutic Exercise shoulder;elbow/forearm  -SP     Row Name 11/23/22 1606          Balance    Balance Assessment sitting dynamic balance  -SP     Dynamic Sitting Balance supervision  -SP     Comment, Balance Pt sat EOB x15 minutes while performing UE exercise with supervision  -SP           User Key  (r) = Recorded By, (t) = Taken By, (c) = Cosigned By    Initials Name Provider Type    Zandra Butt OT Occupational Therapist               Goals/Plan    No documentation.                Clinical Impression     Row Name 11/23/22 1607          Pain Assessment    Pretreatment Pain Rating 0/10 - no pain  -SP     Posttreatment Pain Rating 0/10 - no pain  -SP     Row Name 11/23/22 1607          Plan of Care Review    Plan of Care Reviewed With patient  -SP     Progress improving  -SP     Outcome Evaluation Pt received sitting EOB. Pt agreeable to B UE exercise. Pt performed 10 reps of bicep curls each arm with 1 minute rest break in between sets due to fatigue and SOA. Pt performed 30 seconds of boxing in front of body with B UE for endurance and activity tolerance w/o rest break. Pt completed 10 reps of shoulder abd/add and elbow flex/ext with 1 minute rest break in between reps d/t fatigue. Pt SBA for sit to stand with rw and gait belt, [facility] and [facility] grab bar and rw with CGA for toilet transfer. Pt CGA for toileting d/t impulsivity. Pt ambulated 22' back to bed with CGA and rw. Pt SBA for stand to sit and mod I to return to fowlers in bed. Pt continues to progress with POC and requires frequent rest breaks during session. Pt 02 consistently  >95% throughout session.  -SP     Row Name 11/23/22 1607          Vital Signs    Pre SpO2 (%) 96  -SP     O2 Delivery Pre Treatment room air  3.5L  -SP     Intra SpO2 (%) 95  -SP     O2 Delivery Intra Treatment room air  4L  -SP     Post SpO2 (%) 97  -SP     O2 Delivery Post Treatment room air  3.5L  -SP     Pre Patient Position Sitting  -SP     Intra Patient Position Standing  -SP     Post Patient Position Sitting  -SP     Row Name 11/23/22 1607          Positioning and Restraints    Pre-Treatment Position in bed  -SP     Post Treatment Position bed  -SP     In Bed fowlers;call light within reach;encouraged to call for assist  -SP           User Key  (r) = Recorded By, (t) = Taken By, (c) = Cosigned By    Initials Name Provider Type    Zandra Butt OT Occupational Therapist               Outcome Measures     Row Name 11/23/22 1611          How much help from another is currently needed...    Putting on and taking off regular lower body clothing? 3  -SP     Bathing (including washing, rinsing, and drying) 3  -SP     Toileting (which includes using toilet bed pan or urinal) 3  -SP     Putting on and taking off regular upper body clothing 4  -SP     Taking care of personal grooming (such as brushing teeth) 4  -SP     Eating meals 4  -SP     AM-PAC 6 Clicks Score (OT) 21  -SP     Row Name 11/23/22 1552 11/23/22 0834       How much help from another person do you currently need...    Turning from your back to your side while in flat bed without using bedrails? 4  -RM 4  -MT    Moving from lying on back to sitting on the side of a flat bed without bedrails? 4  -RM 3  -MT    Moving to and from a bed to a chair (including a wheelchair)? 4  -RM 4  -MT    Standing up from a chair using your arms (e.g., wheelchair, bedside chair)? 4  -RM 4  -MT    Climbing 3-5 steps with a railing? 2  -RM 3  -MT    To walk in hospital room? 3  -RM 3  -MT    AM-PAC 6 Clicks Score (PT) 21  -RM 21  -MT    Highest level of mobility 6 -->  Walked 10 steps or more  -RM 6 --> Walked 10 steps or more  -MT    Row Name 11/23/22 1611 11/23/22 1552       Functional Assessment    Outcome Measure Options AM-PAC 6 Clicks Daily Activity (OT)  -SP AM-PAC 6 Clicks Basic Mobility (PT)  -RM          User Key  (r) = Recorded By, (t) = Taken By, (c) = Cosigned By    Initials Name Provider Type    RM Bebeto David, PTA Physical Therapist Assistant    Olga De Jesus, RN Registered Nurse    Zandra Butt, OT Occupational Therapist                Occupational Therapy Education     Title: PT OT SLP Therapies (In Progress)     Topic: Occupational Therapy (In Progress)     Point: ADL training (Done)     Description:   Instruct learner(s) on proper safety adaptation and remediation techniques during self care or transfers.   Instruct in proper use of assistive devices.              Learning Progress Summary           Patient Acceptance, E, DU by SP at 11/23/2022 1612    Comment: Pt edcuated on pacing with B UE exercise program to facilitate endurance and strengthening. Pt demonstrates listening through use of rest breaks.    Acceptance, E,TB, VU by  at 11/22/2022 1423    Comment: Role of OT/POC                   Point: Home exercise program (Done)     Description:   Instruct learner(s) on appropriate technique for monitoring, assisting and/or progressing therapeutic exercises/activities.              Learning Progress Summary           Patient Acceptance, E, DU by SP at 11/23/2022 1612    Comment: Pt edcuated on pacing with B UE exercise program to facilitate endurance and strengthening. Pt demonstrates listening through use of rest breaks.                   Point: Precautions (Not Started)     Description:   Instruct learner(s) on prescribed precautions during self-care and functional transfers.              Learner Progress:  Not documented in this visit.          Point: Body mechanics (Not Started)     Description:   Instruct learner(s) on proper positioning  and spine alignment during self-care, functional mobility activities and/or exercises.              Learner Progress:  Not documented in this visit.                      User Key     Initials Effective Dates Name Provider Type Discipline     06/16/21 -  Clarita Ritter Occupational Therapist OT    SP 09/08/22 -  Zandra Cox OT Occupational Therapist OT              OT Recommendation and Plan     Plan of Care Review  Plan of Care Reviewed With: patient  Progress: improving  Outcome Evaluation: Pt received sitting EOB. Pt agreeable to B UE exercise. Pt performed 10 reps of bicep curls each arm with 1 minute rest break in between sets due to fatigue and SOA. Pt performed 30 seconds of boxing in front of body with B UE for endurance and activity tolerance w/o rest break. Pt completed 10 reps of shoulder abd/add and elbow flex/ext with 1 minute rest break in between reps d/t fatigue. Pt SBA for sit to stand with rw and gait belt, ambulated 22' to commode and utilized grab bar and rw with CGA for toilet transfer. Pt CGA for toileting d/t impulsivity. Pt ambulated 22' back to bed with CGA and rw. Pt SBA for stand to sit and mod I to return to fowlers in bed. Pt continues to progress with POC and requires frequent rest breaks during session. Pt 02 consistently >95% throughout session.     Time Calculation:    Time Calculation- OT     Row Name 11/23/22 1613 11/23/22 1558          Time Calculation- OT    OT Start Time 1527  -SP --     OT Stop Time 1600  -SP --     OT Time Calculation (min) 33 min  -SP --     OT Received On 11/23/22  -SP --     OT Goal Re-Cert Due Date 12/02/22  -SP --        Timed Charges    05906 - OT Therapeutic Exercise Minutes 21 -SP --     96844 - Gait Training Minutes  -- 30  -RM     60753 - OT Self Care/Mgmt Minutes 11 -SP --        Total Minutes    Timed Charges Total Minutes 32  -SP 30  -RM      Total Minutes 32  -SP 30  -RM           User Key  (r) = Recorded By, (t) = Taken By, (c) = Cosigned  By    Initials Name Provider Type    Bebeto Laird, PTA Physical Therapist Assistant    Zandra Butt OT Occupational Therapist              Therapy Charges for Today     Code Description Service Date Service Provider Modifiers Qty    72985163015 HC OT THER PROC EA 15 MIN 11/23/2022 Zandra Cox OT GO 1    [id number] HC OT SELF CARE/MGMT/TRAIN EA 15 MIN 11/23/2022 Zandra Cox OT GO 1               Zandra Cox OT  11/23/2022

## 2022-11-23 NOTE — PLAN OF CARE
Goal Outcome Evaluation:              Outcome Evaluation: Alert and orientec x4.   VSS with intermittent elevated heart rate when up to commode.  Complaint of pain controlled with ordered PRN medication (see MAR).  Continued cardiac monitoring per order.  IV Solu-Medrol (see MAR) administered per order.  Incentive spirometer use encouraged and observed.  No additional events noted during shift.  Will continue to monitor patient.

## 2022-11-23 NOTE — THERAPY TREATMENT NOTE
Patient Name: Gabi Dudley  : 1945    MRN: [id number]                              Today's Date: 2022       Admit Date: 2022    Visit Dx:     ICD-10-CM ICD-9-CM   1. COPD exacerbation (HCC)  J44.1 491.21   2. Bronchitis  J40 490   3. Acute on chronic respiratory failure with hypoxia (HCC)  J96.21 518.84     799.02     Patient Active Problem List   Diagnosis   • Adrenal adenoma   • Anxiety   • Chronic fatigue   • Fibromyalgia   • Hyperlipidemia   • Essential hypertension   • Insomnia   • Osteoarthritis of knee   • Osteoporosis   • Pulmonary emphysema (HCC)   • Simple renal cyst   • Tension headache   • Vitamin D deficiency   • Diverticulosis   • Inversion of nipple   • Paroxysmal atrial fibrillation (HCC)   • Coronary artery disease involving native coronary artery of native heart without angina pectoris   • Autonomic dysfunction   • Gastroesophageal reflux disease without esophagitis   • Urge incontinence   • Erythrasma   • Compression fracture of T5 vertebra (HCC)   • Lung nodule   • COPD exacerbation (HCC)   • Overweight (BMI 25.0-29.9)   • Acute on chronic respiratory failure with hypoxia (HCC)     Past Medical History:   Diagnosis Date   • Adrenal adenoma    • Anemia    • Arrhythmia    • Asthma    • Atrial fibrillation (HCC)    • Back pain    • Benign colonic polyp    • Benign tumor of adrenal gland    • Cataract     bilateral   • Cholelithiasis    • Chronic bronchitis (HCC)    • Chronic bronchitis with COPD (chronic obstructive pulmonary disease) (HCC)    • COPD (chronic obstructive pulmonary disease) (HCC)    • Coronary artery disease    • Depression    • Elevated cholesterol    • Environmental and seasonal allergies    • Fibromyalgia    • Gastritis    • Generalized anxiety disorder    • GERD (gastroesophageal reflux disease)    • H/O mammogram    • Hemorrhoids    • History of blood transfusion    • History of blood transfusion    • History of echocardiogram    • History of  endometriosis    • History of nuclear stress test    • Hypertension    • IBS (irritable bowel syndrome)    • Impaired functional mobility, balance, gait, and endurance    • Inverted nipple    • Kidney disease    • Kidney stone    • Liver cyst    • Nodular radiologic density    • Nodule of left lung    • On home oxygen therapy     2 liters NC QHS   • Osteoarthritis    • Osteoporosis    • PONV (postoperative nausea and vomiting)    • Renal cyst    • Sinus problem     2014   • Sinusitis    • Skin cancer     basal cell carcinoma   • SOB (shortness of breath)    • Tobacco use    • Urinary frequency    • Vitamin D deficiency    • Wears glasses    • Wears partial dentures     upper plate     Past Surgical History:   Procedure Laterality Date   • APPENDECTOMY  1980s   • CARDIAC CATHETERIZATION  2003   • CATARACT EXTRACTION  2013    both eyes   • CHOLECYSTECTOMY WITH INTRAOPERATIVE CHOLANGIOGRAM N/A 10/30/2020    Procedure: CHOLECYSTECTOMY LAPAROSCOPIC INTRAOPERATIVE CHOLANGIOGRAPHY;  Surgeon: Sima Moses MD;  Location: [facility] OR;  Service: General;  Laterality: N/A;   • COLONOSCOPY  03/11/2013   • COLONOSCOPY  06/21/2016   • COLONOSCOPY N/A 7/24/2019    Procedure: COLONOSCOPY W/ COLD FORCEP POLYPECTOMIES; HOT SNARE POLYPECTOMIES; COLD SNARE POLYPECTOMY;  Surgeon: Goyo Nunez MD;  Location: [facility] ENDOSCOPY;  Service: Gastroenterology   • COLONOSCOPY W/ BIOPSIES AND POLYPECTOMY     • EXPLORATORY LAPAROTOMY N/A 11/23/2020    Procedure: colectomy, right, closure of enterotomy x 2, reduction of internal volvulus;  Surgeon: Sima Moses MD;  Location: [facility] OR;  Service: General;  Laterality: N/A;   • HYSTERECTOMY  1980s    partial   • LYSIS OF ABDOMINAL ADHESIONS     • TONSILLECTOMY  1987   • UPPER GASTROINTESTINAL ENDOSCOPY  01/13/2015   • VAGINAL DELIVERY      x2      General Information     Row Name 11/23/22 1538          Physical Therapy Time and Intention    Document Type therapy note (daily note)  -      Mode of Treatment physical therapy  -RM     Row Name 11/23/22 1538          General Information    Patient Profile Reviewed yes  -RM     Existing Precautions/Restrictions oxygen therapy device and L/min;fall  3L pnc  -RM     Row Name 11/23/22 1538          Cognition    Orientation Status (Cognition) oriented x 4  -RM     Row Name 11/23/22 1538          Safety Issues, Functional Mobility    Impairments Affecting Function (Mobility) endurance/activity tolerance;strength;shortness of breath;pain  -RM           User Key  (r) = Recorded By, (t) = Taken By, (c) = Cosigned By    Initials Name Provider Type    Bebeto Laird, RAYRAY Physical Therapist Assistant               Mobility     Row Name 11/23/22 1550          Bed Mobility    All Activities, [place] (Bed Mobility) modified independence  -RM     Row Name 11/23/22 1550          Sit-Stand Transfer    Sit-Stand [place] (Transfers) standby assist;verbal cues  -RM     Assistive Device (Sit-Stand Transfers) walker, front-wheeled  -RM     Row Name 11/23/22 1550          Gait/Stairs (Locomotion)    [place] Level (Gait) standby assist;contact guard;verbal cues;nonverbal cues (demo/gesture)  -RM     Assistive Device (Gait) walker, front-wheeled  -RM     Distance in Feet (Gait) 40' and 60' with seated rest  -RM     Deviations/Abnormal Patterns (Gait) stride length decreased  -RM     Bilateral Gait Deviations forward flexed posture  -RM           User Key  (r) = Recorded By, (t) = Taken By, (c) = Cosigned By    Initials Name Provider Type    Bebeto Laird, RAYRAY Physical Therapist Assistant               Obj/Interventions    No documentation.                Goals/Plan    No documentation.                Clinical Impression     Row Name 11/23/22 1551          Pain    Pretreatment Pain Rating 0/10 - no pain  -RM     Posttreatment Pain Rating 0/10 - no pain  -RM     Row Name 11/23/22 1551          Plan of Care Review    Plan of Care Reviewed With patient   -RM     Progress improving  -RM     Outcome Evaluation Pt recieved supine in bed and willing to particiapte with treatment. Pt was able to transfer to EOB  without assistance. Pt progressed gait distance to 40' and 60' cga/sba with rw.  Pt  did require seated rest between bouts of ambulation. Pt requires vc's for pacing and pace of breathing.  See flowsheet for details  -RM     Row Name 11/23/22 1551          Vital Signs    Pre SpO2 (%) 100  -RM     O2 Delivery Pre Treatment supplemental O2  -RM     Intra SpO2 (%) 90  -RM     O2 Delivery Intra Treatment supplemental O2  -RM     Post SpO2 (%) 95  -RM     O2 Delivery Post Treatment supplemental O2  -RM     Pre Patient Position Supine  -RM     Intra Patient Position Standing  -RM     Post Patient Position Sitting  -RM     Row Name 11/23/22 1551          Positioning and Restraints    Pre-Treatment Position in bed  -RM     Post Treatment Position bed  -RM     In Bed sitting EOB;call light within reach;with OT  -RM           User Key  (r) = Recorded By, (t) = Taken By, (c) = Cosigned By    Initials Name Provider Type    RM Bebeto David, PTA Physical Therapist Assistant               Outcome Measures     Row Name 11/23/22 1552 11/23/22 0834       How much help from another person do you currently need...    Turning from your back to your side while in flat bed without using bedrails? 4  -RM 4  -MT    Moving from lying on back to sitting on the side of a flat bed without bedrails? 4  -RM 3  -MT    Moving to and from a bed to a chair (including a wheelchair)? 4  -RM 4  -MT    Standing up from a chair using your arms (e.g., wheelchair, bedside chair)? 4  -RM 4  -MT    Climbing 3-5 steps with a railing? 2  -RM 3  -MT    To walk in hospital room? 3  -RM 3  -MT    AM-PAC 6 Clicks Score (PT) 21  -RM 21  -MT    Highest level of mobility 6 --> Walked 10 steps or more  -RM 6 --> Walked 10 steps or more  -MT    Row Name 11/23/22 1558          Functional Assessment    Outcome  Measure Options AM-PAC 6 Clicks Basic Mobility (PT)  -           User Key  (r) = Recorded By, (t) = Taken By, (c) = Cosigned By    Initials Name Provider Type    RM Bebeto David, RAYRAY Physical Therapist Assistant    Olga De Jesus RN Registered Nurse                             Physical Therapy Education     Title: PT OT SLP Therapies (In Progress)     Topic: Physical Therapy (In Progress)     Point: Mobility training (Done)     Learning Progress Summary           Patient Acceptance, E,TB,D, VU,NR by RM at 11/23/2022 1553    Comment: Importance of activity and pacing    Acceptance, E, VU by MS at 11/22/2022 1605    Comment: importance of mobility                   Point: Home exercise program (Done)     Learning Progress Summary           Patient Acceptance, E, VU by MS at 11/22/2022 1605    Comment: importance of mobility                   Point: Body mechanics (Not Started)     Learner Progress:  Not documented in this visit.          Point: Precautions (Not Started)     Learner Progress:  Not documented in this visit.                      User Key     Initials Effective Dates Name Provider Type Discipline     06/16/21 -  Bebeto David PTA Physical Therapist Assistant PT    MS 07/01/22 -  Farzad Crowder PT Physical Therapist PT              PT Recommendation and Plan     Plan of Care Reviewed With: patient  Progress: improving  Outcome Evaluation: Pt recieved supine in bed and willing to particiapte with treatment. Pt was able to transfer to EOB  without assistance. Pt progressed gait distance to 40' and 60' cga/sba with rw.  Pt  did require seated rest between bouts of ambulation. Pt requires vc's for pacing and pace of breathing.  See flowsheet for details     Time Calculation:    PT Charges     Row Name 11/23/22 0962             Time Calculation    Start Time 1445  -RM      Stop Time 1530  -RM      Time Calculation (min) 45 min  -RM      PT Received On 11/23/22  -RM      PT Goal Re-Cert  Due Date 12/02/22  -RM         Time Calculation- PT    Total Timed Code Minutes- PT 45 minute(s)  -RM         Timed Charges    55170 - Gait Training Minutes  30  -RM      59579 - PT Therapeutic Activity Minutes 15  -RM         Total Minutes    Timed Charges Total Minutes 45  -RM       Total Minutes 45  -RM            User Key  (r) = Recorded By, (t) = Taken By, (c) = Cosigned By    Initials Name Provider Type    Bebeto Laird, PTA Physical Therapist Assistant              Therapy Charges for Today     Code Description Service Date Service Provider Modifiers Qty    09617362631 HC GAIT TRAINING EA 15 MIN 11/23/2022 Bebeto David, PTA GP 2    31764417788 HC PT THERAPEUTIC ACT EA 15 MIN 11/23/2022 Bebeto David, PTA GP 1          PT G-Codes  Outcome Measure Options: AM-PAC 6 Clicks Basic Mobility (PT)  AM-PAC 6 Clicks Score (PT): 21  AM-PAC 6 Clicks Score (OT): 21       Bebeto David PTA  11/23/2022

## 2022-11-24 ENCOUNTER — READMISSION MANAGEMENT (OUTPATIENT)
Dept: CALL CENTER | Facility: HOSPITAL | Age: 77
End: 2022-11-24

## 2022-11-24 VITALS
SYSTOLIC BLOOD PRESSURE: 153 MMHG | WEIGHT: 164.46 LBS | TEMPERATURE: 97.6 F | RESPIRATION RATE: 16 BRPM | HEART RATE: 97 BPM | OXYGEN SATURATION: 98 % | BODY MASS INDEX: 27.4 KG/M2 | HEIGHT: 65 IN | DIASTOLIC BLOOD PRESSURE: 83 MMHG

## 2022-11-24 PROCEDURE — 94761 N-INVAS EAR/PLS OXIMETRY MLT: CPT

## 2022-11-24 PROCEDURE — 94799 UNLISTED PULMONARY SVC/PX: CPT

## 2022-11-24 PROCEDURE — 25010000002 METHYLPREDNISOLONE PER 40 MG: Performed by: INTERNAL MEDICINE

## 2022-11-24 PROCEDURE — 94664 DEMO&/EVAL PT USE INHALER: CPT

## 2022-11-24 PROCEDURE — G0008 ADMIN INFLUENZA VIRUS VAC: HCPCS | Performed by: STUDENT IN AN ORGANIZED HEALTH CARE EDUCATION/TRAINING PROGRAM

## 2022-11-24 PROCEDURE — 99238 HOSP IP/OBS DSCHRG MGMT 30/<: CPT | Performed by: FAMILY MEDICINE

## 2022-11-24 PROCEDURE — 25010000002 INFLUENZA VAC HIGH-DOSE QUAD 0.7 ML SUSPENSION PREFILLED SYRINGE: Performed by: STUDENT IN AN ORGANIZED HEALTH CARE EDUCATION/TRAINING PROGRAM

## 2022-11-24 PROCEDURE — 90662 IIV NO PRSV INCREASED AG IM: CPT | Performed by: STUDENT IN AN ORGANIZED HEALTH CARE EDUCATION/TRAINING PROGRAM

## 2022-11-24 RX ORDER — PREDNISONE 10 MG/1
TABLET ORAL
Qty: 21 TABLET | Refills: 0 | Status: SHIPPED | OUTPATIENT
Start: 2022-11-24 | End: 2022-12-03

## 2022-11-24 RX ADMIN — OXYCODONE HYDROCHLORIDE AND ACETAMINOPHEN 1 TABLET: 7.5; 325 TABLET ORAL at 13:36

## 2022-11-24 RX ADMIN — IPRATROPIUM BROMIDE AND ALBUTEROL SULFATE 3 ML: 2.5; .5 SOLUTION RESPIRATORY (INHALATION) at 00:37

## 2022-11-24 RX ADMIN — SERTRALINE 100 MG: 50 TABLET, FILM COATED ORAL at 08:28

## 2022-11-24 RX ADMIN — CETIRIZINE HYDROCHLORIDE 10 MG: 10 TABLET, FILM COATED ORAL at 08:28

## 2022-11-24 RX ADMIN — APIXABAN 5 MG: 5 TABLET, FILM COATED ORAL at 08:29

## 2022-11-24 RX ADMIN — IPRATROPIUM BROMIDE AND ALBUTEROL SULFATE 3 ML: 2.5; .5 SOLUTION RESPIRATORY (INHALATION) at 07:13

## 2022-11-24 RX ADMIN — ATORVASTATIN CALCIUM 20 MG: 20 TABLET, FILM COATED ORAL at 08:28

## 2022-11-24 RX ADMIN — DULOXETINE HYDROCHLORIDE 60 MG: 30 CAPSULE, DELAYED RELEASE ORAL at 08:28

## 2022-11-24 RX ADMIN — Medication 10 ML: at 08:29

## 2022-11-24 RX ADMIN — METHYLPREDNISOLONE SODIUM SUCCINATE 40 MG: 40 INJECTION, POWDER, FOR SOLUTION INTRAMUSCULAR; INTRAVENOUS at 08:29

## 2022-11-24 RX ADMIN — GUAIFENESIN 600 MG: 600 TABLET ORAL at 08:29

## 2022-11-24 RX ADMIN — CLONAZEPAM 1 MG: 0.5 TABLET ORAL at 13:36

## 2022-11-24 RX ADMIN — INFLUENZA A VIRUS A/VICTORIA/2570/2019 IVR-215 (H1N1) ANTIGEN (FORMALDEHYDE INACTIVATED), INFLUENZA A VIRUS A/DARWIN/9/2021 SAN-010 (H3N2) ANTIGEN (FORMALDEHYDE INACTIVATED), INFLUENZA B VIRUS B/PHUKET/3073/2013 ANTIGEN (FORMALDEHYDE INACTIVATED), AND INFLUENZA B VIRUS B/MICHIGAN/01/2021 ANTIGEN (FORMALDEHYDE INACTIVATED) 0.7 ML: 60; 60; 60; 60 INJECTION, SUSPENSION INTRAMUSCULAR at 14:22

## 2022-11-24 RX ADMIN — PANTOPRAZOLE SODIUM 40 MG: 40 TABLET, DELAYED RELEASE ORAL at 08:28

## 2022-11-24 NOTE — PLAN OF CARE
Goal Outcome Evaluation:  Plan of Care Reviewed With: patient        Progress: improving  Outcome Evaluation: Pt resting well. VSS. Pt currently on 2 liters nasal cannula.

## 2022-11-24 NOTE — CASE MANAGEMENT/SOCIAL WORK
Case Management/Social Work    Patient Name:  Gabi Dudley  YOB: 1945  MRN: [id number]  Admit Date:  11/21/2022    Upon DC pt's daughter's requests that they try to get HH and they would be open to trying [facility]. CSW explained to daughter that this will be determined by pt's insurance and if it will cover and we are unable to call today as it is Thanksgiving.     Electronically signed by:  Elena Hess  11/24/22 13:15 EST

## 2022-11-24 NOTE — CASE MANAGEMENT/SOCIAL WORK
Case Management Discharge Note      Final Note: Spoke to [facility] Confirmed they are her DME orginal order is for 2 LNC continously .Will need new studies and order for greater than that oxygen requirmets Inquired aboiut a POC  will need the same new orders and studies for thsi if pt is on 4- 5 Lnc will not qualify for POC          Durable Medical Equipment     Service Provider Selected Services Address Phone Fax Patient Preferred    [facility] [employer] [facility] [employer] [address], [facility] [address] [phone] --       Internal Comment last updated by Malu Holden, APRN 11/22/2022 1608    Oxygen order verified for 2L cont.                       Transportation Services  Private: Car    Final Discharge Disposition Code: 01 - home or self-care

## 2022-11-24 NOTE — OUTREACH NOTE
Prep Survey    Flowsheet Row Responses   [facility] facility patient discharged from? [place]   Is LACE score < 7 ? No   Emergency Room discharge w/ pulse ox? No   Eligibility TCM   [facility]   Date of Admission 11/21/22   Date of Discharge 11/24/22   Discharge Disposition Home or Self Care   Discharge diagnosis A/C hypoxic resp failure, Acute COPD exacerbation, A-fib, chronic anemia   Does the patient have one of the following disease processes/diagnoses(primary or secondary)? COPD   Does the patient have Home health ordered? Yes   What is the Home health agency?  HH ordered   Is there a DME ordered? Yes   What DME was ordered? has O2 from [facility]   Prep survey completed? Yes          BROOKE HOLBROOK - Registered Nurse

## 2022-11-24 NOTE — PLAN OF CARE
Goal Outcome Evaluation:  Plan of Care Reviewed With: patient, daughter, son        Progress: improving   CHETNA is back to her home baseline of 2 L NC. She is A+O x 4. Anxiety and pain managed per MAR. She is SR on cardiac telemetry. She is discharged home with daughter to transport and assist as needed. Discharge teaching complete. Follow- up appointments will be provided Monday by US. Home health has been ordered and  will arrange that after the holiday.

## 2022-11-24 NOTE — DISCHARGE SUMMARY
[facility]   DISCHARGE SUMMARY      Name:  Gabi Dudley   Age:  77 y.o.  Sex:  female  :  1945  MRN:  [id number]   Visit Number:  [id number]    Admission Date:  2022  Date of Discharge:  2022  Primary Care Physician:  Paul Quinteros MD    Important issues to note:    Patient was discharged on her home oxygen of 2 L.    Steroids with prednisone taper on discharge.    Follow-up with PCP and pulmonology as an outpatient.    Discharge Diagnoses:     1. Acute on chronic hypoxic respiratory failure, POA.  2. Acute COPD exacerbation, POA.  3. Paroxysmal atrial fibrillation on apixaban.  4. Essential hypertension.  5. Chronic anemia.    Problem List:     Active Hospital Problems    Diagnosis  POA   • **COPD exacerbation (HCC) [J44.1]  Yes   • Overweight (BMI 25.0-29.9) [E66.3]  Yes   • Acute on chronic respiratory failure with hypoxia (HCC) [J96.21]  Yes   • Gastroesophageal reflux disease without esophagitis [K21.9]  Yes   • Paroxysmal atrial fibrillation (HCC) [I48.0]  Yes   • Essential hypertension [I10]  Yes      Resolved Hospital Problems   No resolved problems to display.     Presenting Problem:    Chief Complaint   Patient presents with   • Shortness of Breath      Consults:     Consulting Physician(s)             None          Procedures Performed:        History of presenting illness/Hospital Course:    Ms. Dudley is a 77-year-old female with history of atrial fibrillation on anticoagulation, COPD on home oxygen at 2 to 3 L, coronary artery disease, GERD, osteoarthritis was brought to the emergency room by EMS to the emergency room with symptoms of cough and increased shortness of breath.  Patient states that she was in the emergency room on 2022 with similar complaints and was subsequently discharged home on Augmentin, azithromycin and prednisone.  She completed the course but never felt better and subsequently started having significant shortness of breath.   In the last couple days, patient has been progressively dyspneic and has been having trouble to walk to the bathroom and back.  Patient became significantly distressed today when she was in the bathroom and had called her daughter to help her.  She was subsequently was brought to the emergency room by EMS.  She denies any chest pain.  No prior history of coronary artery disease or CHF.  She follows up with Dr. Poe who is her pulmonologist.  Patient has a BiPAP at home but unfortunately she has not been compliant.  She states that she quit smoking 2 years ago.  Her daughter lives with her but her daughter works during the day.     In the emergency room, she was afebrile but tachycardic at 116, with initial blood pressure of 170/105.  She was hypoxic when EMS saw her and they had to put her on 6 L of nasal cannula oxygen and she came in with saturations of 98% on the 6 L.  Blood work including CMP and CBC was unremarkable except for a glucose of 153, WBC of 12 and a hemoglobin of 9.8.  Troponin T, proBNP, lactic acid, lipase and procalcitonin levels were within normal limits.  D-dimer level was also within normal limits.  Respiratory panel including COVID-19 was within normal limits.  Patient recently had an echocardiogram on 11/2/2022 which showed normal left ventricular ejection fraction of 60% with normal diastolic filling without any significant valvular abnormalities.  Right ventricular size and systolic function was also normal.  Chest x-ray done in the emergency room showed emphysema with possible bronchitis no focal consolidation was noted.  Blood cultures were drawn in the emergency room.  Patient was given bronchodilators, IV magnesium, IV Solu-Medrol as well as a dose of ceftriaxone in the emergency room and is currently being admitted to the medical floor with telemetry.    Patient was admitted and treated with IV Solu-Medrol which was tapered down as patient improved.  Antibiotics were not indicated  and Pro-Aung was negative.  D-dimer was negative.  Patient respiratory status progressively improved and was titrated down to 2 L of oxygen which is her home baseline oxygen requirement.  Patient was seen and examined on day of discharge.  Patient reports feeling better and doing overall good this morning.  Patient did not have any acute complaints and is ready to go home to celebrate the holiday with her family.  Patient started on steroids taper and COPD management at home.  Patient follow-up with her PCP and pulmonology as an outpatient.  Patient verbalized understanding and agreeable plan.  All questions were answered to her satisfaction.     Vital Signs:    Temp:  [97.6 °F (36.4 °C)-98.3 °F (36.8 °C)] 97.6 °F (36.4 °C)  Heart Rate:  [] 97  Resp:  [16-20] 16  BP: (127-163)/(72-95) 153/83    Physical Exam:    General Appearance:  Alert and cooperative.  No acute distress.   Head:  Atraumatic and normocephalic.   Eyes: Conjunctivae and sclerae normal, no icterus. No pallor.   Ears:  Ears with no abnormalities noted.   Throat: No oral lesions, no thrush, oral mucosa moist.   Neck: Supple, trachea midline, no thyromegaly.   Presented Stephy lungs:   Breath sounds heard bilaterally equally.  Decreased air movement bilaterally.    Improving mild expiratory wheezing.  Prolonged expiration.  Unlabored.   Heart:  Normal S1 and S2, no murmur, no gallop, no rub. No JVD.   Abdomen:   Normal bowel sounds, no masses, no organomegaly. Soft, nontender, nondistended, no rebound tenderness.   Extremities: Supple, no edema, no cyanosis, no clubbing.   Pulses: Pulses palpable bilaterally.   Skin: No bleeding or rash.   Neurologic: Alert and oriented x 3. No facial asymmetry. Moves all four limbs. No tremors.     Pertinent Lab Results:     Results from last 7 days   Lab Units 11/21/22  1019   SODIUM mmol/L 137   POTASSIUM mmol/L 4.1   CHLORIDE mmol/L 96*   CO2 mmol/L 32.3*   BUN mg/dL 19   CREATININE mg/dL 0.64   CALCIUM mg/dL  9.1   BILIRUBIN mg/dL 0.2   ALK PHOS U/L 69   ALT (SGPT) U/L 13   AST (SGOT) U/L 17   GLUCOSE mg/dL 153*     Results from last 7 days   Lab Units 11/21/22  1019   WBC 10*3/mm3 12.33*   HEMOGLOBIN g/dL 9.8*   HEMATOCRIT % 33.8*   PLATELETS 10*3/mm3 256         Results from last 7 days   Lab Units 11/21/22  1019   TROPONIN T ng/mL <0.010     Results from last 7 days   Lab Units 11/21/22  1019   PROBNP pg/mL 531.0         Results from last 7 days   Lab Units 11/21/22  1019   LIPASE U/L 17     Results from last 7 days   Lab Units 11/21/22  1027   PH, ARTERIAL pH units 7.443   PO2 ART mm Hg 68.9*   PCO2, ARTERIAL mm Hg 47.0*   HCO3 ART mmol/L 32.1*     Results from last 7 days   Lab Units 11/21/22  1310 11/21/22  1305   BLOODCX  No growth at 2 days No growth at 2 days       Pertinent Radiology Results:    Imaging Results (All)     Procedure Component Value Units Date/Time    XR Chest 1 View [[id number]] Collected: 11/21/22 1102     Updated: 11/21/22 1107    Narrative:      CLINICAL INDICATION:    SOA Triage Protocol shortness of breath.      EXAMINATION TECHNIQUE:   XR CHEST 1 VW-     COMPARISON:  Radiographs 11/01/2022.     FINDINGS:  No dense focal airspace consolidation. Emphysema. Bibasilar linear and  reticular opacities. No pneumothorax or pleural effusion. Mild bilateral  perihilar fullness/prominent pulmonary arteries. Mild bronchial wall  thickening. Heart is normal in size. Aortic atherosclerosis and  tortuosity. No acute osseous or soft tissue abnormality. Degenerative  changes of the thoracic spine and bilateral shoulder joints. No free air  under hemidiaphragms.       Impression:      Emphysema. Possible bronchitis. No dense focal airspace consolidation.           Images personally reviewed, interpreted and dictated by SHAMIKA Sanchez.                This report was signed and finalized on 11/21/2022 11:05 AM by SHAMIKA Sanchez.          Echo:    Results for orders placed in visit on  11/02/22    Adult Transthoracic Echo Complete W/ Cont if Necessary Per Protocol    Interpretation Summary  1.  Normal left ventricular size and systolic function, LVEF 60-65%.  2.  Mild concentric LVH.  3.  Normal LV diastolic filling pattern.  4.  Normal right ventricular size and systolic function.  5.  Mildly increased left atrial volume index.  6.  No significant valvular abnormalities.    Condition on Discharge:      Stable.    Code status during the hospital stay:    Code Status and Medical Interventions:   Ordered at: 11/22/22 0746     Code Status (Patient has no pulse and is not breathing):    CPR (Attempt to Resuscitate)     Medical Interventions (Patient has pulse or is breathing):    Full Support     Release to patient:    Routine Release     Discharge Disposition:    Home or Self Care    Discharge Medications:       Discharge Medications      New Medications      Instructions Start Date   predniSONE 10 MG tablet  Commonly known as: DELTASONE   Take 4 tablets by mouth Daily for 3 days, THEN 2 tablets Daily for 3 days, THEN 1 tablet Daily for 3 days.   Start Date: November 24, 2022        Changes to Medications      Instructions Start Date   benzonatate 100 MG capsule  Commonly known as: Tessalon Perles  What changed: additional instructions   100 mg, Oral, 3 Times Daily PRN      ondansetron 4 MG tablet  Commonly known as: ZOFRAN  What changed: additional instructions   4 mg, Oral, Every 8 Hours PRN      traZODone 150 MG tablet  Commonly known as: DESYREL  What changed: additional instructions   150 mg, Oral, Every Evening         Continue These Medications      Instructions Start Date   albuterol sulfate  (90 Base) MCG/ACT inhaler  Commonly known as: PROVENTIL HFA;VENTOLIN HFA;PROAIR HFA   INHALE 2 PUFFS BY MOUTH EVERY 4 HOURS AS NEEDED FOR WHEEZING      apixaban 5 MG tablet tablet  Commonly known as: Eliquis   5 mg, Oral, Every 12 Hours      atorvastatin 20 MG tablet  Commonly known as: LIPITOR    TAKE 1 TABLET BY MOUTH EVERY DAY      Azelastine HCl 137 MCG/SPRAY solution   137 mcg, Nasal, 2 Times Daily      Budeson-Glycopyrrol-Formoterol 160-9-4.8 MCG/ACT aerosol inhaler  Commonly known as: BREZTRI   2 puffs, Inhalation, 2 Times Daily, Alternates between Trelegy and Breztri from month to month depending on which samples PCP has available.      cetirizine 10 MG tablet  Commonly known as: zyrTEC   10 mg, Oral, Daily      clonazePAM 1 MG tablet  Commonly known as: KlonoPIN   1 mg, Oral, Daily PRN      clotrimazole-betamethasone 1-0.05 % cream  Commonly known as: Lotrisone   Topical, 2 Times Daily      DULoxetine 60 MG capsule  Commonly known as: CYMBALTA   TAKE ONE CAPSULE BY MOUTH TWICE DAILY      fluticasone 50 MCG/ACT nasal spray  Commonly known as: FLONASE   2 sprays, Nasal, Daily      Fluticasone-Umeclidin-Vilant 100-62.5-25 MCG/ACT inhaler  Commonly known as: TRELEGY   1 puff, Inhalation, Daily - RT, Alternates between Trelegy and Breztri from month to month depending on which samples PCP has available.      guaiFENesin 600 MG 12 hr tablet  Commonly known as: MUCINEX   1,200 mg, Oral, 2 Times Daily, PRN for productive cough      ipratropium-albuterol 0.5-2.5 mg/3 ml nebulizer  Commonly known as: DUO-NEB   USE 3 ML VIA NEBULIZER EVERY 4 HOURS AS NEEDED FOR WHEEZING      loperamide 2 MG capsule  Commonly known as: IMODIUM   2 mg, Oral, 4 Times Daily PRN      O2  Commonly known as: OXYGEN   2 L/min, Inhalation, As Needed      oxyCODONE-acetaminophen 7.5-325 MG per tablet  Commonly known as: PERCOCET   1 tablet, Oral, Every 4 Hours PRN      pantoprazole 40 MG EC tablet  Commonly known as: PROTONIX   40 mg, Oral, Daily      promethazine 25 MG tablet  Commonly known as: PHENERGAN   25 mg, Oral, Every 8 Hours PRN      sertraline 100 MG tablet  Commonly known as: ZOLOFT   TAKE 1 & 1/2 TABLET BY MOUTH DAILY      Sodium Fluoride 1.1 % gel   USE AS DIRECTED ON LABEL      vitamin D3 125 MCG (5000 UT) capsule  capsule   5,000 Units, Oral, Daily         Stop These Medications    azithromycin 250 MG tablet  Commonly known as: ZITHROMAX     HYDROcodone-acetaminophen  MG per tablet  Commonly known as: NORCO     rivaroxaban 20 MG tablet  Commonly known as: Xarelto          Discharge Diet:     Diet Instructions     Diet: Cardiac      Discharge Diet: Cardiac        Activity at Discharge:   As tolerated    Follow-up Appointments:     Contact information for follow-up providers     Paul Quinteros MD Follow up in 3 day(s).    Specialty: Internal Medicine  Contact information:  [address]  [address]  [facility] [address]  [phone]             Herbert Poe MD Follow up in 2 week(s).    Specialties: Pulmonary Disease, Sleep Medicine  Contact information:  [address]  [facility] [address]  [phone]                   Contact information for after-discharge care     Durable Medical Equipment     [facility]    Service: Durable Medical Equipment  Contact information:  [address]  [place] [address]  [phone]                           Future Appointments   Date Time Provider Department Center   12/5/2022  4:00 PM Paul Quinteros MD [facility]   12/14/2022  5:45 PM [facility] CT 1 BH [facility]   3/15/2023  2:45 PM Herbert Poe MD [facility]   3/31/2023  3:30 PM  4 - CHAIR 1  [facility] INF BH [facility] OP[facility]     Test Results Pending at Discharge:    Pending Labs     Order Current Status    Blood Culture With LILIANA - Blood, Arm, Left Preliminary result    Blood Culture With LILIANA - Blood, Hand, Left Preliminary result             Tom Keyes MD  11/24/22  11:55 EST    Time: I spent 27  minutes on this discharge activity which included: face-to-face encounter with the patient, reviewing the data in the system, coordination of the care with the nursing staff as well as consultants, documentation, and entering orders.     Dictated utilizing Dragon dictation.

## 2022-11-24 NOTE — DISCHARGE INSTRUCTIONS
- Continue oxygen at home as previously.  -Take steroids taper as prescribed, avoid stopping steroids suddenly.  -Follow-up with your primary care provider in 2 to 3 days for continued care.  Follow-up with pulmonologist-in the office in 2 weeks.

## 2022-11-24 NOTE — PROGRESS NOTES
[facility]    Name:  Gabi Dudley   Age:  77 y.o.  Sex:  female  :  1945  MRN:  [id number]   Visit Number:  [id number]  Admission Date:  2022  Date Of Service:  22  Primary Care Physician:  Paul Quinteros MD     LOS: 1 day :    Chief Complaint:      Cough and shortness of breath.    Subjective:    Patient was seen and examined at bedside. Patient was laying comfortably in bed and unlabored on breathing.  Patient was sleeping when I entered the room however was easily arousable.  Patient reports continued improvement today and her wheezing is improving.  She had to be increased on her oxygen to 4 to 5 L overnight, still having dyspnea which she was trying to go to the restroom.  No other complaints otherwise.  She is currently down to 3 L of oxygen.  Other vitals stable.    Hospital Course:    Ms. Dudley is a 77-year-old female with history of atrial fibrillation on anticoagulation, COPD on home oxygen at 3 L, coronary artery disease, GERD, osteoarthritis was brought to the emergency room by EMS to the emergency room with symptoms of cough and increased shortness of breath.  Patient states that she was in the emergency room on 2022 with similar complaints and was subsequently discharged home on Augmentin, azithromycin and prednisone.  She completed the course but never felt better and subsequently started having significant shortness of breath.  In the last couple days, patient has been progressively dyspneic and has been having trouble to walk to the bathroom and back.  Patient became significantly distressed today when she was in the bathroom and had called her daughter to help her.  She was subsequently was brought to the emergency room by EMS.  She denies any chest pain.  No prior history of coronary artery disease or CHF.  She follows up with Dr. Poe who is her pulmonologist.  Patient has a BiPAP at home but unfortunately she has not been  compliant.  She states that she quit smoking 2 years ago.  Her daughter lives with her but her daughter works during the day.     In the emergency room, she was afebrile but tachycardic at 116, with initial blood pressure of 170/105.  She was hypoxic when EMS saw her and they had to put her on 6 L of nasal cannula oxygen and she came in with saturations of 98% on the 6 L.  Blood work including CMP and CBC was unremarkable except for a glucose of 153, WBC of 12 and a hemoglobin of 9.8.  Troponin T, proBNP, lactic acid, lipase and procalcitonin levels were within normal limits.  D-dimer level was also within normal limits.  Respiratory panel including COVID-19 was within normal limits.  Patient recently had an echocardiogram on 11/2/2022 which showed normal left ventricular ejection fraction of 60% with normal diastolic filling without any significant valvular abnormalities.  Right ventricular size and systolic function was also normal.  Chest x-ray done in the emergency room showed emphysema with possible bronchitis no focal consolidation was noted.  Blood cultures were drawn in the emergency room.  Patient was given bronchodilators, IV magnesium, IV Solu-Medrol as well as a dose of ceftriaxone in the emergency room and is currently being admitted to the medical floor with telemetry.    Review of Systems:     All systems were reviewed and negative except as mentioned in subjective, assessment and plan.    Vital Signs:    Temp:  [98.1 °F (36.7 °C)-98.7 °F (37.1 °C)] 98.1 °F (36.7 °C)  Heart Rate:  [] 104  Resp:  [18-20] 20  BP: (117-164)/(70-95) 141/95    Intake and output:    I/O last 3 completed shifts:  In: 1800 [P.O.:1800]  Out: -   No intake/output data recorded.    Physical Examination:    General Appearance:  Alert and cooperative.  No acute distress.   Head:  Atraumatic and normocephalic.   Eyes: Conjunctivae and sclerae normal, no icterus. No pallor.   Throat: No oral lesions, no thrush, oral mucosa  moist.   Neck: Supple, trachea midline, no thyromegaly.   Lungs:   Breath sounds heard bilaterally equally.  Decreased air movement bilaterally.  Mild expiratory wheezing.  Prolonged expiration.   Heart:  Normal S1 and S2, no murmur, no gallop, no rub. No JVD.   Abdomen:   Normal bowel sounds, no masses, no organomegaly. Soft, nontender, nondistended, no rebound tenderness.   Extremities: Supple, no edema, no cyanosis, no clubbing.   Skin: No bleeding or rash.   Neurologic: Alert and oriented x 3. No facial asymmetry. Moves all four limbs. No tremors.      Laboratory results:    Results from last 7 days   Lab Units 11/21/22  1019   SODIUM mmol/L 137   POTASSIUM mmol/L 4.1   CHLORIDE mmol/L 96*   CO2 mmol/L 32.3*   BUN mg/dL 19   CREATININE mg/dL 0.64   CALCIUM mg/dL 9.1   BILIRUBIN mg/dL 0.2   ALK PHOS U/L 69   ALT (SGPT) U/L 13   AST (SGOT) U/L 17   GLUCOSE mg/dL 153*     Results from last 7 days   Lab Units 11/21/22  1019   WBC 10*3/mm3 12.33*   HEMOGLOBIN g/dL 9.8*   HEMATOCRIT % 33.8*   PLATELETS 10*3/mm3 256         Results from last 7 days   Lab Units 11/21/22  1019   TROPONIN T ng/mL <0.010     Results from last 7 days   Lab Units 11/21/22  1310 11/21/22  1305   BLOODCX  No growth at 2 days No growth at 2 days     Recent Labs     03/17/22  1525 11/21/22  1027   PHART 7.393 7.443   KHH1VJL 59.8* 47.0*   PO2ART 51.7* 68.9*   [id number] 36.4* 32.1*   BASEEXCESS 9.5* 7.1*      I have reviewed the patient's laboratory results.    Radiology results:    No radiology results from the last 24 hrs  I have reviewed the patient's radiology reports.    Medication Review:     I have reviewed the patient's active and prn medications.     Problem List:      COPD exacerbation (HCC)    Essential hypertension    Paroxysmal atrial fibrillation (HCC)    Gastroesophageal reflux disease without esophagitis    Overweight (BMI 25.0-29.9)    Acute on chronic respiratory failure with hypoxia (HCC)      Assessment:    1. Acute on chronic  hypoxic respiratory failure, POA.  2. Acute COPD exacerbation, POA.  3. Paroxysmal atrial fibrillation on apixaban.  4. Essential hypertension.  5. Chronic anemia.    Plan:      Respiratory failure/COPD exacerbation.   - Patient will be continued on nasal cannula oxygen, she was titrated down to 4 L and we will continue to down titrate to her baseline levels as tolerated.  Patient on 2 L at all times at baseline.  - Continue bronchodilators, budesonide, IV Solu-Medrol and mucolytic agents.  We will taper steroids down as she improves.  Starting p.o. prednisone from tomorrow.  - Continue BiPAP therapy at night and I have strongly advised her to be compliant with her home BiPAP regimen.  - Fortunately she is not a current smoker and declined nicotine patch.  - Procalcitonin negative, no indication for antibiotics at this time, patient has finished antibiotics as an outpatient prior to admission.   - D-dimer negative.      Essential hypertension/paroxysmal atrial fibrillation.  - Continue home medications including apixaban.     Consulted physical and occupational therapy.   Continue home meds as warranted.  Further orders as indicated per clinical course.    DVT Prophylaxis: Apixaban  Code Status: Full  Diet: Cardiac  Discharge Plan: Likely home in 2 to 3 days    Tom Keyes MD  11/23/22  19:15 EST    Dictated utilizing Dragon dictation.

## 2022-11-25 ENCOUNTER — TRANSITIONAL CARE MANAGEMENT TELEPHONE ENCOUNTER (OUTPATIENT)
Dept: CALL CENTER | Facility: HOSPITAL | Age: 77
End: 2022-11-25

## 2022-11-25 ENCOUNTER — HOME HEALTH ADMISSION (OUTPATIENT)
Dept: HOME HEALTH SERVICES | Facility: HOME HEALTHCARE | Age: 77
End: 2022-11-25
Payer: MEDICARE

## 2022-11-25 NOTE — OUTREACH NOTE
Call Center TCM Note    Flowsheet Row Responses   [facility] patient discharged from? [facility]   Does the patient have one of the following disease processes/diagnoses(primary or secondary)? COPD   TCM attempt successful? No   Unsuccessful attempts Attempt 1  [No updated verbal release on file from PCP group ]   Call Status --  [No personalized VM ]          Cande Serrano RN    11/25/2022, 15:29 EST

## 2022-11-25 NOTE — OUTREACH NOTE
Call Center TCM Note    Flowsheet Row Responses   [facility] patient discharged from? [facility]   Does the patient have one of the following disease processes/diagnoses(primary or secondary)? COPD   TCM attempt successful? No   Unsuccessful attempts Attempt 2          Cande Serrano RN    11/25/2022, 15:47 EST

## 2022-11-26 LAB
BACTERIA SPEC AEROBE CULT: NORMAL
BACTERIA SPEC AEROBE CULT: NORMAL

## 2022-11-27 ENCOUNTER — HOME CARE VISIT (OUTPATIENT)
Dept: HOME HEALTH SERVICES | Facility: HOME HEALTHCARE | Age: 77
End: 2022-11-27
Payer: MEDICARE

## 2022-11-27 ENCOUNTER — TRANSITIONAL CARE MANAGEMENT TELEPHONE ENCOUNTER (OUTPATIENT)
Dept: CALL CENTER | Facility: HOSPITAL | Age: 77
End: 2022-11-27

## 2022-11-27 VITALS
DIASTOLIC BLOOD PRESSURE: 72 MMHG | OXYGEN SATURATION: 100 % | HEIGHT: 65 IN | WEIGHT: 160 LBS | SYSTOLIC BLOOD PRESSURE: 138 MMHG | RESPIRATION RATE: 18 BRPM | TEMPERATURE: 98.3 F | BODY MASS INDEX: 26.66 KG/M2 | HEART RATE: 88 BPM

## 2022-11-27 PROCEDURE — G0299 HHS/HOSPICE OF RN EA 15 MIN: HCPCS

## 2022-11-27 NOTE — OUTREACH NOTE
Call Center TCM Note    Flowsheet Row Responses   [facility] [place] patient discharged from? [facility]   Does the patient have one of the following disease processes/diagnoses(primary or secondary)? COPD   TCM attempt successful? No   Unsuccessful attempts Attempt 3          Nevin Banks RN    11/27/2022, 11:04 EST

## 2022-11-27 NOTE — HOME HEALTH
SOC Note: Patient with recent hospitilization related to COPD exacerbation/pneumonia. Patient reports very weakened state and is not able to perform ADL's independently. Patient on 2-3L O2 currently. Patient appeared very anxious during SOC visit. Family present and reassurred patient. Currently all lobes with rhochi and left upper lung with wheezes, airway found to be tight and breathing slightly labored. Family and patient report pulmonary status is much imporved since going into the hospital. Currently has Bipap that she reports she rarely uses. Home is multilevel and patient is not able to leave top floor. Exiting home is taxing and requires 2 person assist. Daughter is assisting with medications at this time. Daughter would like to be contacted to schedule all visits and family has requested only one discipline per day.     Home Health ordered for: disciplines SN/PT/OT    Reason for Hosp/Primary Dx/Co-morbidities: COPD exacerbation    Focus of Care: Cardiopulmonary assessments, disease teaching, O2 teaching related to COPD exacerbation    Current Functional status/mobility/DME: requires assistance with ambulation and all ADL's    HB status/Living Arrangements: temporary short term assistance    Skin Integrity/wound status: c/d/i    Code Status: Full    Fall Risk: Yes    POC confirmed with patient and daughter on 11/27/22    Plan for next visit: Cardiopulmonary assessmnet, disease teaching

## 2022-11-29 ENCOUNTER — HOME CARE VISIT (OUTPATIENT)
Dept: HOME HEALTH SERVICES | Facility: HOME HEALTHCARE | Age: 77
End: 2022-11-29
Payer: MEDICARE

## 2022-11-29 VITALS
SYSTOLIC BLOOD PRESSURE: 135 MMHG | OXYGEN SATURATION: 97 % | RESPIRATION RATE: 16 BRPM | HEART RATE: 101 BPM | DIASTOLIC BLOOD PRESSURE: 86 MMHG

## 2022-11-29 PROCEDURE — G0151 HHCP-SERV OF PT,EA 15 MIN: HCPCS

## 2022-11-29 NOTE — HOME HEALTH
"Ms. Dudley is a 77-year-old female with history of atrial fibrillation on anticoagulation, COPD on home oxygen at 2 to 3 L, coronary artery disease, GERD, osteoarthritis, recently admitted for emphysema with possible bronchitis no focal consolidation     Pt reports she uses rollator outside of home requiring assist to navigate oxygen tank. She lives in two story home with daughter; 8 steps to enter/exit home.   Pt's goals include improved tolerance to activity and \"wants to be able to stand long enough to bake.\"    During eval, pt able to perform 53 sec of ambulation; O2 at 97% and HR at 114 bpm following."

## 2022-11-30 ENCOUNTER — HOME CARE VISIT (OUTPATIENT)
Dept: HOME HEALTH SERVICES | Facility: HOME HEALTHCARE | Age: 77
End: 2022-11-30
Payer: MEDICARE

## 2022-11-30 VITALS
DIASTOLIC BLOOD PRESSURE: 81 MMHG | OXYGEN SATURATION: 95 % | HEART RATE: 113 BPM | SYSTOLIC BLOOD PRESSURE: 138 MMHG | TEMPERATURE: 97.6 F | RESPIRATION RATE: 24 BRPM

## 2022-11-30 PROCEDURE — G0495 RN CARE TRAIN/EDU IN HH: HCPCS

## 2022-11-30 NOTE — HOME HEALTH
ROUTINE SNV NOTE:    Patient ambulatory to open back door. Patient observed holding on to walls and furniture to ambulate. Patient noted to have increased WOB. She reports increased SOB with activity. Oxygen is in place. O2 sat WNL. RR 24. RR returns to normal with rest. Patient instructed on energy conservation and pursed lip breathing. Assessed respiratory status. Will continue with teaching and education COPD disease process and home managment.

## 2022-12-01 ENCOUNTER — HOME CARE VISIT (OUTPATIENT)
Dept: HOME HEALTH SERVICES | Facility: HOME HEALTHCARE | Age: 77
End: 2022-12-01
Payer: MEDICARE

## 2022-12-01 NOTE — CASE COMMUNICATION
Patient missed a HHAIDE visit from [facility] on 12-1-22.     Reason: Patient cancelled HHA visit.       For your records only.   As per home health protocol, MD must be notified of missed/cancelled visits; therefore the prescribed frequency was not met.

## 2022-12-02 ENCOUNTER — HOME CARE VISIT (OUTPATIENT)
Dept: HOME HEALTH SERVICES | Facility: HOME HEALTHCARE | Age: 77
End: 2022-12-02
Payer: MEDICARE

## 2022-12-02 PROCEDURE — G0300 HHS/HOSPICE OF LPN EA 15 MIN: HCPCS

## 2022-12-05 ENCOUNTER — OFFICE VISIT (OUTPATIENT)
Dept: INTERNAL MEDICINE | Facility: CLINIC | Age: 77
End: 2022-12-05

## 2022-12-05 VITALS
WEIGHT: 161 LBS | TEMPERATURE: 96.6 F | SYSTOLIC BLOOD PRESSURE: 142 MMHG | BODY MASS INDEX: 26.82 KG/M2 | HEIGHT: 65 IN | OXYGEN SATURATION: 96 % | DIASTOLIC BLOOD PRESSURE: 90 MMHG | HEART RATE: 97 BPM

## 2022-12-05 DIAGNOSIS — F51.04 PSYCHOPHYSIOLOGICAL INSOMNIA: ICD-10-CM

## 2022-12-05 DIAGNOSIS — F41.9 ANXIETY: Primary | ICD-10-CM

## 2022-12-05 DIAGNOSIS — D64.9 ANEMIA, UNSPECIFIED TYPE: ICD-10-CM

## 2022-12-05 DIAGNOSIS — J42 CHRONIC BRONCHITIS, UNSPECIFIED CHRONIC BRONCHITIS TYPE: ICD-10-CM

## 2022-12-05 PROCEDURE — 1111F DSCHRG MED/CURRENT MED MERGE: CPT | Performed by: INTERNAL MEDICINE

## 2022-12-05 PROCEDURE — 99495 TRANSJ CARE MGMT MOD F2F 14D: CPT | Performed by: INTERNAL MEDICINE

## 2022-12-05 RX ORDER — TRAZODONE HYDROCHLORIDE 100 MG/1
TABLET ORAL
Qty: 30 TABLET | Refills: 2 | Status: SHIPPED | OUTPATIENT
Start: 2022-12-05

## 2022-12-05 RX ORDER — DULOXETIN HYDROCHLORIDE 60 MG/1
60 CAPSULE, DELAYED RELEASE ORAL 2 TIMES DAILY
Qty: 180 CAPSULE | Refills: 3 | Status: SHIPPED | OUTPATIENT
Start: 2022-12-05 | End: 2023-01-19 | Stop reason: SDUPTHER

## 2022-12-05 RX ORDER — BENZONATATE 100 MG/1
100 CAPSULE ORAL 3 TIMES DAILY PRN
Qty: 60 CAPSULE | Refills: 1 | Status: SHIPPED | OUTPATIENT
Start: 2022-12-05

## 2022-12-05 NOTE — PROGRESS NOTES
Subjective   Gabi Dudley is a 77 y.o. female.     Chief Complaint   Patient presents with   • Follow-up   • Hypertension   • Hyperlipidemia   • Insomnia     Discuss medication- wishes to have smaller doses        History of Present Illness       Current Outpatient Medications:   •  albuterol sulfate  (90 Base) MCG/ACT inhaler, INHALE 2 PUFFS BY MOUTH EVERY 4 HOURS AS NEEDED FOR WHEEZING, Disp: 18 g, Rfl: 5  •  apixaban (Eliquis) 5 MG tablet tablet, Take 1 tablet by mouth Every 12 (Twelve) Hours., Disp: 60 tablet, Rfl: 11  •  atorvastatin (LIPITOR) 20 MG tablet, TAKE 1 TABLET BY MOUTH EVERY DAY, Disp: 90 tablet, Rfl: 3  •  Azelastine HCl 137 MCG/SPRAY solution, 1 spray into the nostril(s) as directed by provider 2 (Two) Times a Day., Disp: 30 mL, Rfl: 5  •  benzonatate (Tessalon Perles) 100 MG capsule, Take 1 capsule by mouth 3 (Three) Times a Day As Needed for Cough., Disp: 60 capsule, Rfl: 1  •  Budeson-Glycopyrrol-Formoterol (BREZTRI) 160-9-4.8 MCG/ACT aerosol inhaler, Inhale 2 puffs 2 (Two) Times a Day. Alternates between Trelegy and Breztri from month to month depending on which samples PCP has available., Disp: , Rfl:   •  cetirizine (zyrTEC) 10 MG tablet, Take 1 tablet by mouth Daily., Disp: 30 tablet, Rfl: 2  •  Cholecalciferol (vitamin D3) 125 MCG (5000 UT) capsule capsule, Take 5,000 Units by mouth Daily., Disp: , Rfl:   •  clonazePAM (KlonoPIN) 1 MG tablet, Take 1 tablet by mouth Daily As Needed for Anxiety., Disp: 30 tablet, Rfl: 0  •  clotrimazole-betamethasone (Lotrisone) 1-0.05 % cream, Apply  topically to the appropriate area as directed 2 (Two) Times a Day., Disp: 45 g, Rfl: 1  •  DULoxetine (CYMBALTA) 60 MG capsule, Take 1 capsule by mouth 2 (Two) Times a Day., Disp: 180 capsule, Rfl: 3  •  fluticasone (FLONASE) 50 MCG/ACT nasal spray, 2 sprays into the nostril(s) as directed by provider Daily., Disp: , Rfl:   •  Fluticasone-Umeclidin-Vilant (TRELEGY) 100-62.5-25 MCG/ACT inhaler, Inhale 1  puff Daily. [facility] from month to month depending on which samples PCP has available., Disp: , Rfl:   •  ipratropium-albuterol (DUO-NEB) 0.5-2.5 mg/3 ml nebulizer, USE 3 ML VIA NEBULIZER EVERY 4 HOURS AS NEEDED FOR WHEEZING, Disp: 360 mL, Rfl: 11  •  loperamide (IMODIUM) 2 MG capsule, Take 1 capsule by mouth 4 (Four) Times a Day As Needed for Diarrhea., Disp: 20 capsule, Rfl: 0  •  O2 (OXYGEN), Inhale 2 L/min As Needed., Disp: , Rfl:   •  ondansetron (ZOFRAN) 4 MG tablet, Take 1 tablet by mouth Every 8 (Eight) Hours As Needed for Nausea or Vomiting., Disp: 90 tablet, Rfl: 0  •  oxyCODONE-acetaminophen (PERCOCET) 7.5-325 MG per tablet, Take 1 tablet by mouth Every 4 (Four) Hours As Needed., Disp: , Rfl:   •  pantoprazole (PROTONIX) 40 MG EC tablet, TAKE 1 TABLET BY MOUTH DAILY, Disp: 90 tablet, Rfl: 3  •  promethazine (PHENERGAN) 25 MG tablet, Take 1 tablet by mouth Every 8 (Eight) Hours As Needed for Nausea or Vomiting., Disp: 30 tablet, Rfl: 1  •  sertraline (ZOLOFT) 100 MG tablet, TAKE 1 & 1/2 TABLET BY MOUTH DAILY, Disp: 135 tablet, Rfl: 3  •  Sodium Fluoride 1.1 % gel, USE AS DIRECTED ON LABEL, Disp: , Rfl:   •  traZODone (DESYREL) 100 MG tablet, 1 qhs po prn, Disp: 30 tablet, Rfl: 2    The following portions of the patient's history were reviewed and updated as appropriate: allergies, current medications, past family history, past medical history, past social history, past surgical history and problem list.    Review of Systems   Constitutional: Positive for diaphoresis and fatigue. Negative for chills and fever.   Respiratory: Positive for cough. Negative for shortness of breath.    Cardiovascular: Negative.    Gastrointestinal: Negative.    Musculoskeletal: Negative.    Skin: Negative.    Neurological: Negative.    Psychiatric/Behavioral: Negative.        Objective   Physical Exam  Constitutional:       Appearance: Normal appearance.   HENT:      Head: Atraumatic.      Right  Ear: Tympanic membrane normal.      Left Ear: Tympanic membrane normal.   Cardiovascular:      Rate and Rhythm: Normal rate and regular rhythm.      Heart sounds: Normal heart sounds.   Pulmonary:      Effort: Pulmonary effort is normal.      Breath sounds: Wheezing and rhonchi present.   Abdominal:      General: Bowel sounds are normal.   Musculoskeletal:      Cervical back: Neck supple.   Skin:     General: Skin is warm.   Neurological:      Mental Status: She is alert and oriented to person, place, and time.   Psychiatric:         Behavior: Behavior normal.         All tests have been reviewed.    Assessment & Plan   Diagnoses and all orders for this visit:    Anxiety  -     DULoxetine (CYMBALTA) 60 MG capsule; Take 1 capsule by mouth 2 (Two) Times a Day.    Chronic bronchitis, unspecified chronic bronchitis type (HCC)  -     benzonatate (Tessalon Perles) 100 MG capsule; Take 1 capsule by mouth 3 (Three) Times a Day As Needed for Cough.  -     XR Chest PA & Lateral  -     Basic metabolic panel    Psychophysiological insomnia  -     traZODone (DESYREL) 100 MG tablet; 1 qhs po prn    Anemia, unspecified type  -     CBC & Differential      Need concentrator

## 2022-12-05 NOTE — PROGRESS NOTES
Transitional Care Follow Up Visit  Subjective     Gabi Dudley is a 77 y.o. female who presents for a transitional care management visit.    Within 48 business hours after discharge our office contacted her via telephone to coordinate her care and needs.      I reviewed and discussed the details of that call along with the discharge summary, hospital problems, inpatient lab results, inpatient diagnostic studies, and consultation reports with Gabi.     Current outpatient and discharge medications have been reconciled for the patient.  Reviewed by: Paul Quinteros MD      Date of TCM Phone Call 11/24/2022   [facility]   Date of Admission 11/21/2022   Date of Discharge 11/24/2022   Discharge Disposition Home or Self Care     Risk for Readmission (LACE) Score: 9 (11/24/2022  6:00 AM)      History of Present Illness   Course During Hospital Stay: Patient here for follow-up 2 weeks ago patient discharged from hospital due to respiratory distress COPD exacerbation.  After antibiotics respiratory treatment steroids patient's condition gradually getting better.  Patient denies any short of breath now cough is also improved.  O2 sat on 2 L oxygen 96%.  Patient still feels very weak sweaty and lightheadedness when standing up.  Blood tests in the hospital showed anemia and chest x-ray questionable bronchitis.  Blood pressure slightly elevated.  Patient complains of ear discomfort.  Patient is also in need of portable concentrator.     The following portions of the patient's history were reviewed and updated as appropriate: allergies, current medications, past family history, past medical history, past social history, past surgical history and problem list.    Review of Systems   Constitutional: Positive for diaphoresis and fatigue. Negative for chills and fever.   Respiratory: Positive for cough and wheezing. Negative for shortness of breath.    Cardiovascular: Negative.  Negative for chest pain.   Gastrointestinal:  Negative.    Musculoskeletal: Negative.    Skin: Negative.    Neurological: Negative.    Psychiatric/Behavioral: Positive for agitation.       Objective   Physical Exam  Constitutional:       Appearance: Normal appearance.   HENT:      Right Ear: Tympanic membrane normal.      Left Ear: Tympanic membrane normal.   Cardiovascular:      Rate and Rhythm: Normal rate and regular rhythm.      Heart sounds: Normal heart sounds.   Pulmonary:      Effort: Pulmonary effort is normal.      Breath sounds: Wheezing and rhonchi present.   Abdominal:      General: Bowel sounds are normal.   Musculoskeletal:      Cervical back: Neck supple.   Skin:     General: Skin is warm.   Neurological:      Mental Status: She is alert and oriented to person, place, and time.   Psychiatric:         Behavior: Behavior normal.      Comments: Patient is agitated         Assessment & Plan   Diagnoses and all orders for this visit:    1. Anxiety (Primary) refill medication  -     DULoxetine (CYMBALTA) 60 MG capsule; Take 1 capsule by mouth 2 (Two) Times a Day.  Dispense: 180 capsule; Refill: 3    2. Chronic bronchitis, unspecified chronic bronchitis type (HCC) continue inhaler oxygen.  Patient is in need of portable oxygen concentrator.  Check chest x-ray and blood tests  -     benzonatate (Tessalon Perles) 100 MG capsule; Take 1 capsule by mouth 3 (Three) Times a Day As Needed for Cough.  Dispense: 60 capsule; Refill: 1  -     XR Chest PA & Lateral  -     Basic metabolic panel    3. Psychophysiological insomnia stable on medication continue medication  -     traZODone (DESYREL) 100 MG tablet; 1 qhs po prn  Dispense: 30 tablet; Refill: 2    4. Anemia, unspecified type repeat blood tests  -     CBC & Differential    Lightheadedness encourage patient to slow to rise    Diaphoresis possible due to COPD exacerbation recently continue to watch for now    Ear discomfort exam unremarkable continue decongestant Zyrtec daily    Follow-up in 1 month.  Call  if condition worse

## 2022-12-06 ENCOUNTER — HOME CARE VISIT (OUTPATIENT)
Dept: HOME HEALTH SERVICES | Facility: HOME HEALTHCARE | Age: 77
End: 2022-12-06
Payer: MEDICARE

## 2022-12-06 VITALS
RESPIRATION RATE: 20 BRPM | DIASTOLIC BLOOD PRESSURE: 90 MMHG | HEART RATE: 100 BPM | TEMPERATURE: 97.8 F | OXYGEN SATURATION: 95 % | SYSTOLIC BLOOD PRESSURE: 145 MMHG

## 2022-12-06 PROCEDURE — G0300 HHS/HOSPICE OF LPN EA 15 MIN: HCPCS

## 2022-12-06 PROCEDURE — G0157 HHC PT ASSISTANT EA 15: HCPCS

## 2022-12-07 ENCOUNTER — HOME CARE VISIT (OUTPATIENT)
Dept: HOME HEALTH SERVICES | Facility: HOME HEALTHCARE | Age: 77
End: 2022-12-07
Payer: MEDICARE

## 2022-12-07 VITALS
RESPIRATION RATE: 18 BRPM | OXYGEN SATURATION: 98 % | HEART RATE: 67 BPM | SYSTOLIC BLOOD PRESSURE: 106 MMHG | TEMPERATURE: 97.9 F | DIASTOLIC BLOOD PRESSURE: 67 MMHG

## 2022-12-07 PROCEDURE — G0152 HHCP-SERV OF OT,EA 15 MIN: HCPCS

## 2022-12-07 PROCEDURE — G0156 HHCP-SVS OF AIDE,EA 15 MIN: HCPCS

## 2022-12-09 ENCOUNTER — HOME CARE VISIT (OUTPATIENT)
Dept: HOME HEALTH SERVICES | Facility: HOME HEALTHCARE | Age: 77
End: 2022-12-09
Payer: MEDICARE

## 2022-12-09 VITALS
RESPIRATION RATE: 20 BRPM | HEART RATE: 98 BPM | TEMPERATURE: 96.8 F | SYSTOLIC BLOOD PRESSURE: 138 MMHG | DIASTOLIC BLOOD PRESSURE: 80 MMHG

## 2022-12-09 NOTE — HOME HEALTH
Routine Visit Note:     Skill/education provided: Educated on oxygen safety procedures    Patient/caregiver response: patient verbalised understanding of instructions    Plan for next visit: Continue with plan of care as set    Other pertinent info: Patient states she has an appointment with Dr. Kapoor on 12/5/22

## 2022-12-11 NOTE — CASE COMMUNICATION
Patient missed a Skilled RN visit from [facility] on 12.9.2022.     Reason: Per patient caregiver/family, that stsated that patient wanted to cancel visit after confirming today and would like to resume next week.       For your records only.   As per home health protocol, MD must be notified of missed/cancelled visits; therefore the prescribed frequency was not met.

## 2022-12-12 ENCOUNTER — TELEPHONE (OUTPATIENT)
Dept: CARDIOLOGY | Facility: CLINIC | Age: 77
End: 2022-12-12

## 2022-12-12 VITALS
SYSTOLIC BLOOD PRESSURE: 133 MMHG | HEART RATE: 107 BPM | DIASTOLIC BLOOD PRESSURE: 87 MMHG | OXYGEN SATURATION: 94 % | RESPIRATION RATE: 17 BRPM

## 2022-12-12 DIAGNOSIS — F41.9 ANXIETY: ICD-10-CM

## 2022-12-12 DIAGNOSIS — R11.0 NAUSEA: ICD-10-CM

## 2022-12-12 PROCEDURE — G0180 MD CERTIFICATION HHA PATIENT: HCPCS | Performed by: INTERNAL MEDICINE

## 2022-12-12 RX ORDER — ONDANSETRON 4 MG/1
4 TABLET, FILM COATED ORAL EVERY 8 HOURS PRN
Qty: 90 TABLET | Refills: 0 | Status: CANCELLED | OUTPATIENT
Start: 2022-12-12

## 2022-12-12 NOTE — TELEPHONE ENCOUNTER
CONTACTED ALL. WE ARE CURRENTLY OUT OF SAMPLES RIGHT NOW. ADVISED THAT WE HAVE SOME SAMPLES ON THE WAY AND TO CALL OFFICE BACK Wednesday.

## 2022-12-12 NOTE — TELEPHONE ENCOUNTER
Caller: Malu Aguilar    Relationship: Emergency Contact    Best call back number: [phone]    What is the best time to reach you: ANYTIME    What was the call regarding: WOULD LIKE TO KNOW IF WE HAVE ANY ELIQUIS 5MG SAMPLES FOR THE PATIENT. ONLY HAS 2 DAYS REMAINING.     Do you require a callback: YES

## 2022-12-12 NOTE — TELEPHONE ENCOUNTER
Caller: Debra Aguilarly    Relationship: Emergency Contact    Best call back number:     [phone]     Requested Prescriptions:   Requested Prescriptions     Pending Prescriptions Disp Refills   • ondansetron (ZOFRAN) 4 MG tablet 90 tablet 0     Sig: Take 1 tablet by mouth Every 8 (Eight) Hours As Needed for Nausea or Vomiting.   • clonazePAM (KlonoPIN) 1 MG tablet 30 tablet 0     Sig: Take 1 tablet by mouth Daily As Needed for Anxiety.        [address]  [address] [phone] [facility] [phone]       Additional details provided by patient: PATIENT IS ALMOST OUT OF THE ZOFRAN MEDICATION   SHE WANTS TO GO AHEAD AND REQUEST THE CLONAZEPAM FOR HER NEXT REFILL    Does the patient have less than a 3 day supply:  [x] Yes  [] No    Would you like a call back once the refill request has been completed: [x] Yes [] No    If the office needs to give you a call back, can they leave a voicemail: [] Yes [] No

## 2022-12-12 NOTE — TELEPHONE ENCOUNTER
Rx Refill Note  Requested Prescriptions     Pending Prescriptions Disp Refills   • ondansetron (ZOFRAN) 4 MG tablet 90 tablet 0     Sig: Take 1 tablet by mouth Every 8 (Eight) Hours As Needed for Nausea or Vomiting.   • clonazePAM (KlonoPIN) 1 MG tablet 30 tablet 0     Sig: Take 1 tablet by mouth Daily As Needed for Anxiety.      Last office visit with prescribing clinician: 12/5/2022     Next office visit with prescribing clinician: Visit date not found                         Would you like a call back once the refill request has been completed: [] Yes [] No    If the office needs to give you a call back, can they leave a voicemail: [] Yes [] No    Jackie Leone MA  12/12/22, 10:57 EST     No controlled substance agreement on file  No UDS in chart  Last fill of controlled 10/17/2022

## 2022-12-12 NOTE — HOME HEALTH
Routine Visit Note: OT INIUTIAL EVAL    Skill/education provided: OT EVAL COMPLETED. OT PROVIDED SKILLED INSTRUCTION ON EC/WS, AE/AT DURING ADLS AND TRANSFERS, HOME SAFETY ED MODS.    Patient/caregiver response: PT VERBALIZED OR DEMOS UNDERSTANDING OF INSTRUCTION. PT IN AGREEMENT WITH POC.    Plan for next visit: ADLS AND TRANSFER TRAINING USING EC/WS AND AE/AT TRAINING.

## 2022-12-13 ENCOUNTER — HOME CARE VISIT (OUTPATIENT)
Dept: HOME HEALTH SERVICES | Facility: HOME HEALTHCARE | Age: 77
End: 2022-12-13
Payer: MEDICARE

## 2022-12-13 DIAGNOSIS — R11.0 NAUSEA: ICD-10-CM

## 2022-12-13 PROCEDURE — G0300 HHS/HOSPICE OF LPN EA 15 MIN: HCPCS

## 2022-12-13 RX ORDER — ONDANSETRON 4 MG/1
TABLET, FILM COATED ORAL
Qty: 90 TABLET | Refills: 0 | OUTPATIENT
Start: 2022-12-13

## 2022-12-14 ENCOUNTER — HOME CARE VISIT (OUTPATIENT)
Dept: HOME HEALTH SERVICES | Facility: HOME HEALTHCARE | Age: 77
End: 2022-12-14
Payer: MEDICARE

## 2022-12-14 ENCOUNTER — APPOINTMENT (OUTPATIENT)
Dept: CT IMAGING | Facility: HOSPITAL | Age: 77
End: 2022-12-14

## 2022-12-14 VITALS
DIASTOLIC BLOOD PRESSURE: 84 MMHG | RESPIRATION RATE: 18 BRPM | HEART RATE: 100 BPM | SYSTOLIC BLOOD PRESSURE: 153 MMHG | OXYGEN SATURATION: 93 % | TEMPERATURE: 97.2 F

## 2022-12-14 VITALS
SYSTOLIC BLOOD PRESSURE: 142 MMHG | OXYGEN SATURATION: 91 % | TEMPERATURE: 97.9 F | DIASTOLIC BLOOD PRESSURE: 83 MMHG | RESPIRATION RATE: 20 BRPM | HEART RATE: 99 BPM

## 2022-12-14 DIAGNOSIS — R11.0 NAUSEA: ICD-10-CM

## 2022-12-14 PROCEDURE — G0152 HHCP-SERV OF OT,EA 15 MIN: HCPCS

## 2022-12-14 RX ORDER — CLONAZEPAM 1 MG/1
1 TABLET ORAL DAILY PRN
Qty: 30 TABLET | Refills: 0 | Status: SHIPPED | OUTPATIENT
Start: 2022-12-14 | End: 2023-01-19 | Stop reason: SDUPTHER

## 2022-12-14 RX ORDER — ONDANSETRON 4 MG/1
4 TABLET, FILM COATED ORAL EVERY 8 HOURS PRN
Qty: 90 TABLET | Refills: 0 | Status: SHIPPED | OUTPATIENT
Start: 2022-12-14

## 2022-12-14 NOTE — HOME HEALTH
Routine Visit Note: LPN    Skill/education provided: medication frequency, dose, purpose and side effects.    Patient/caregiver response: patient verbalized understanding of instructions    Plan for next visit: Continue with plan of care as set    Other pertinent info: n/a

## 2022-12-14 NOTE — HOME HEALTH
Routine Visit Note:     Skill/education provided: medication frequency, dose, purpose and side effects.    Patient/caregiver response: patient verbalized understanding of instructions    Plan for next visit: Continue with plan of care as set    Other pertinent info: n/a

## 2022-12-15 ENCOUNTER — HOME CARE VISIT (OUTPATIENT)
Dept: HOME HEALTH SERVICES | Facility: HOME HEALTHCARE | Age: 77
End: 2022-12-15
Payer: MEDICARE

## 2022-12-15 DIAGNOSIS — I48.0 PAROXYSMAL ATRIAL FIBRILLATION: ICD-10-CM

## 2022-12-16 ENCOUNTER — HOME CARE VISIT (OUTPATIENT)
Dept: HOME HEALTH SERVICES | Facility: HOME HEALTHCARE | Age: 77
End: 2022-12-16
Payer: MEDICARE

## 2022-12-20 ENCOUNTER — HOME CARE VISIT (OUTPATIENT)
Dept: HOME HEALTH SERVICES | Facility: HOME HEALTHCARE | Age: 77
End: 2022-12-20
Payer: MEDICARE

## 2022-12-20 PROCEDURE — G0300 HHS/HOSPICE OF LPN EA 15 MIN: HCPCS

## 2022-12-21 ENCOUNTER — HOME CARE VISIT (OUTPATIENT)
Dept: HOME HEALTH SERVICES | Facility: HOME HEALTHCARE | Age: 77
End: 2022-12-21
Payer: MEDICARE

## 2022-12-21 PROCEDURE — G0152 HHCP-SERV OF OT,EA 15 MIN: HCPCS

## 2022-12-22 ENCOUNTER — HOME CARE VISIT (OUTPATIENT)
Dept: HOME HEALTH SERVICES | Facility: HOME HEALTHCARE | Age: 77
End: 2022-12-22
Payer: MEDICARE

## 2022-12-22 NOTE — CASE COMMUNICATION
Patient missed a AIDE visit from [facility] on 12-22-22.     Reason: Patient refused.       For your records only.   As per home health protocol, MD must be notified of missed/cancelled visits; therefore the prescribed frequency was not met.

## 2022-12-23 ENCOUNTER — HOME CARE VISIT (OUTPATIENT)
Dept: HOME HEALTH SERVICES | Facility: HOME HEALTHCARE | Age: 77
End: 2022-12-23
Payer: MEDICARE

## 2022-12-27 ENCOUNTER — HOME CARE VISIT (OUTPATIENT)
Dept: HOME HEALTH SERVICES | Facility: HOME HEALTHCARE | Age: 77
End: 2022-12-27
Payer: MEDICARE

## 2022-12-27 NOTE — CASE COMMUNICATION
Patient missed a HHAIDE visit from [facility] on 12-27-22.     Reason: Patient-Family haven't responded to schedule HHA visits have reached out by phone and text serveral time with no feeback.       For your records only.   As per home health protocol, MD must be notified of missed/cancelled visits; therefore the prescribed frequency was not met.

## 2022-12-28 ENCOUNTER — HOME CARE VISIT (OUTPATIENT)
Dept: HOME HEALTH SERVICES | Facility: HOME HEALTHCARE | Age: 77
End: 2022-12-28
Payer: MEDICARE

## 2022-12-28 VITALS
HEART RATE: 87 BPM | RESPIRATION RATE: 16 BRPM | DIASTOLIC BLOOD PRESSURE: 74 MMHG | SYSTOLIC BLOOD PRESSURE: 131 MMHG | OXYGEN SATURATION: 92 %

## 2022-12-28 VITALS — RESPIRATION RATE: 20 BRPM | OXYGEN SATURATION: 97 % | HEART RATE: 96 BPM

## 2022-12-28 PROCEDURE — G0495 RN CARE TRAIN/EDU IN HH: HCPCS

## 2022-12-28 NOTE — HOME HEALTH
Routine Visit Note: OT ROUTINE VISIT    Skill/education provided: OT PROVIDED SKILLED INSTRUCTION ON UE HEP FOR STRENGTH, ADL RTETRAINING USING AE/AT AND EC/WS AND HOME SAFETY ED/MODS.    Patient/caregiver response: PT TOLERATED TX WELL. PROGRESSING ANS EXPECTED WITH POC. OT SUSPECTS GOOD FOLLOWTHROUGH WITH HOME PROGRAM BASED ON PT TEACH BACK.    Plan for next visit: CONTINUE THER EX/ACTS FOR UE STRENGTH, TRANSFER TRAINING AND IADL RETRAINING.

## 2022-12-28 NOTE — HOME HEALTH
Routine Visit Note:     Skill/education provided: Educated pain management for chronic pain. Assessed respiratory status.    Patient/caregiver response: Verbalized understanding.    Plan for next visit: Continue with POC.    Other pertinent info: Patient request discountinuation of HHA services.

## 2022-12-29 VITALS
DIASTOLIC BLOOD PRESSURE: 86 MMHG | SYSTOLIC BLOOD PRESSURE: 140 MMHG | HEART RATE: 87 BPM | TEMPERATURE: 97 F | RESPIRATION RATE: 20 BRPM | OXYGEN SATURATION: 95 %

## 2022-12-29 NOTE — HOME HEALTH
Routine visit: LPN    Skill/education provided: Assessment and educated on oxygen safety, medication purpose, dosage, frequency, and side effects.    Patient/caregiver response: patient verbalized understanding of instructions    Plan for next visit: Continue with plan of care as set    Other pertinent info: n/a

## 2022-12-30 ENCOUNTER — HOME CARE VISIT (OUTPATIENT)
Dept: HOME HEALTH SERVICES | Facility: HOME HEALTHCARE | Age: 77
End: 2022-12-30
Payer: MEDICARE

## 2022-12-30 PROCEDURE — G0152 HHCP-SERV OF OT,EA 15 MIN: HCPCS

## 2023-01-02 VITALS
OXYGEN SATURATION: 92 % | DIASTOLIC BLOOD PRESSURE: 79 MMHG | HEART RATE: 81 BPM | RESPIRATION RATE: 16 BRPM | SYSTOLIC BLOOD PRESSURE: 130 MMHG | HEART RATE: 84 BPM | DIASTOLIC BLOOD PRESSURE: 84 MMHG | OXYGEN SATURATION: 93 % | RESPIRATION RATE: 16 BRPM | SYSTOLIC BLOOD PRESSURE: 137 MMHG

## 2023-01-03 ENCOUNTER — HOME CARE VISIT (OUTPATIENT)
Dept: HOME HEALTH SERVICES | Facility: HOME HEALTHCARE | Age: 78
End: 2023-01-03
Payer: MEDICARE

## 2023-01-03 DIAGNOSIS — I48.0 PAROXYSMAL ATRIAL FIBRILLATION: ICD-10-CM

## 2023-01-03 PROCEDURE — G0300 HHS/HOSPICE OF LPN EA 15 MIN: HCPCS

## 2023-01-03 NOTE — HOME HEALTH
Routine Visit Note: OT ROUTINE VISIT    Skill/education provided: OT PROVIDED SKILLED INSTRUCTION ON ADLS/IADLS AND UE HEP.    Patient/caregiver response: PT PROGRESSING AS EXPECTED WITH POC.    Plan for next visit: CONTINUE OT PER POC.

## 2023-01-03 NOTE — HOME HEALTH
Routine Visit Note: OT ROUTINE VISIT    Skill/education provided: OT PROVIDED SKILLED INSTRUCTION ON UE HEP FOR STRENGTH AND ENDURANCE. OT PROVIDED ADL/IADL RETRAINING USING EC/WS AND AE/AT. PT INSTRUCTED ON HOME SAFETY ED/MODS FOR FALL PREVENTION.    Patient/caregiver response: PT TOLERATED TX WELL. PROGRESSING AS EXPECTED WITH POC.    Plan for next visit: CONTINUE OT PER POC.

## 2023-01-04 ENCOUNTER — HOME CARE VISIT (OUTPATIENT)
Dept: HOME HEALTH SERVICES | Facility: HOME HEALTHCARE | Age: 78
End: 2023-01-04
Payer: MEDICARE

## 2023-01-04 VITALS
DIASTOLIC BLOOD PRESSURE: 83 MMHG | SYSTOLIC BLOOD PRESSURE: 155 MMHG | RESPIRATION RATE: 16 BRPM | HEART RATE: 96 BPM | OXYGEN SATURATION: 96 %

## 2023-01-04 PROCEDURE — G0151 HHCP-SERV OF PT,EA 15 MIN: HCPCS

## 2023-01-05 NOTE — HOME HEALTH
Pt discharged from PT at her request. Pt has only had 1 visit between evaluation and discharge due to cancellations, per PTA. Pt reports she is near baseline function and does not need skilled PT at this time. Discharged with goals partially met.

## 2023-01-06 ENCOUNTER — HOME CARE VISIT (OUTPATIENT)
Dept: HOME HEALTH SERVICES | Facility: HOME HEALTHCARE | Age: 78
End: 2023-01-06
Payer: MEDICARE

## 2023-01-09 ENCOUNTER — HOME CARE VISIT (OUTPATIENT)
Dept: HOME HEALTH SERVICES | Facility: HOME HEALTHCARE | Age: 78
End: 2023-01-09
Payer: MEDICARE

## 2023-01-09 PROCEDURE — G0300 HHS/HOSPICE OF LPN EA 15 MIN: HCPCS

## 2023-01-12 NOTE — CASE COMMUNICATION
Patient missed a PTA visit from [facility] on 12/16/22.    Reason: Patient's family caregiver had called to cancel visit. No reason given.      For your records only.   As per home health protocol, MD must be notified of missed/cancelled visits; therefore the prescribed frequency was not met.

## 2023-01-12 NOTE — CASE COMMUNICATION
Patient missed a PTA visit from [facility] on 12/23/22.    Reason: PTA was unable to reach patient by phone. He was unable to attempt driveby visit due to inclement weather.      For your records only.   As per home health protocol, MD must be notified of missed/cancelled visits; therefore the prescribed frequency was not met.

## 2023-01-13 ENCOUNTER — HOME CARE VISIT (OUTPATIENT)
Dept: HOME HEALTH SERVICES | Facility: HOME HEALTHCARE | Age: 78
End: 2023-01-13
Payer: MEDICARE

## 2023-01-13 PROCEDURE — G0152 HHCP-SERV OF OT,EA 15 MIN: HCPCS

## 2023-01-16 ENCOUNTER — HOME CARE VISIT (OUTPATIENT)
Dept: HOME HEALTH SERVICES | Facility: HOME HEALTHCARE | Age: 78
End: 2023-01-16
Payer: MEDICARE

## 2023-01-16 PROCEDURE — G0495 RN CARE TRAIN/EDU IN HH: HCPCS

## 2023-01-17 VITALS
SYSTOLIC BLOOD PRESSURE: 142 MMHG | OXYGEN SATURATION: 95 % | HEART RATE: 68 BPM | TEMPERATURE: 97.3 F | DIASTOLIC BLOOD PRESSURE: 83 MMHG | RESPIRATION RATE: 22 BRPM

## 2023-01-17 NOTE — HOME HEALTH
The following d/c instructions were provided to patient during visit.    Keep appointments with providers as scheduled.    Contact your provider if you develop any s/s of chest pain, SOB, dizziness, diaphoresis, uncontrolled n/v, and/or syncopy/near syncopy.    Follow the prescribed diet ordered by your provider.

## 2023-01-19 VITALS
HEART RATE: 78 BPM | OXYGEN SATURATION: 97 % | RESPIRATION RATE: 15 BRPM | DIASTOLIC BLOOD PRESSURE: 70 MMHG | SYSTOLIC BLOOD PRESSURE: 127 MMHG

## 2023-01-19 DIAGNOSIS — J43.9 PULMONARY EMPHYSEMA, UNSPECIFIED EMPHYSEMA TYPE: ICD-10-CM

## 2023-01-19 DIAGNOSIS — F41.9 ANXIETY: ICD-10-CM

## 2023-01-19 RX ORDER — IPRATROPIUM BROMIDE AND ALBUTEROL SULFATE 2.5; .5 MG/3ML; MG/3ML
SOLUTION RESPIRATORY (INHALATION)
Qty: 360 ML | Refills: 11 | Status: SHIPPED | OUTPATIENT
Start: 2023-01-19

## 2023-01-19 RX ORDER — CLONAZEPAM 1 MG/1
1 TABLET ORAL DAILY PRN
Qty: 30 TABLET | Refills: 0 | Status: SHIPPED | OUTPATIENT
Start: 2023-01-19 | End: 2023-03-14 | Stop reason: SDUPTHER

## 2023-01-19 RX ORDER — DULOXETIN HYDROCHLORIDE 60 MG/1
60 CAPSULE, DELAYED RELEASE ORAL 2 TIMES DAILY
Qty: 180 CAPSULE | Refills: 3 | Status: SHIPPED | OUTPATIENT
Start: 2023-01-19

## 2023-01-19 NOTE — TELEPHONE ENCOUNTER
Rx Refill Note  Requested Prescriptions     Pending Prescriptions Disp Refills   • clonazePAM (KlonoPIN) 1 MG tablet 30 tablet 0     Sig: Take 1 tablet by mouth Daily As Needed for Anxiety.   • ipratropium-albuterol (DUO-NEB) 0.5-2.5 mg/3 ml nebulizer 360 mL 11   • DULoxetine (CYMBALTA) 60 MG capsule 180 capsule 3     Sig: Take 1 capsule by mouth 2 (Two) Times a Day.      Last office visit with prescribing clinician: 12/5/2022   Last telemedicine visit with prescribing clinician: 3/14/2023   Next office visit with prescribing clinician: 3/14/2023     UDS: not on file  CSA: not on file  {    Shayna Dewitt MA  01/19/23, 14:55 EST

## 2023-01-19 NOTE — HOME HEALTH
Routine Visit Note: OT DISCHARGE VISIT    Skill/education provided: OT PROVIDED SKILLED INSTRUCTION ON UE HEP, ADLS/IADLS, HOME SAFETY, AE/AT AND EC/WS.     Patient/caregiver response: PT MET GOALS AS STATED ON POC. PT IN AGREEMENT WITH DC.

## 2023-01-19 NOTE — TELEPHONE ENCOUNTER
.    Caller: Malu Aguilar    Relationship: Emergency Contact    Best call back number: [phone]    Requested Prescriptions:   Requested Prescriptions     Pending Prescriptions Disp Refills   • clonazePAM (KlonoPIN) 1 MG tablet 30 tablet 0     Sig: Take 1 tablet by mouth Daily As Needed for Anxiety.   • ipratropium-albuterol (DUO-NEB) 0.5-2.5 mg/3 ml nebulizer 360 mL 11   • DULoxetine (CYMBALTA) 60 MG capsule 180 capsule 3     Sig: Take 1 capsule by mouth 2 (Two) Times a Day.        Pharmacy where request should be sent: [address] - [phone]  - [phone] [facility]     Additional details provided by patient: OUT OF MEDICATION     Does the patient have less than a 3 day supply:  [x] Yes  [] No    Would you like a call back once the refill request has been completed: [] Yes [x] No    If the office needs to give you a call back, can they leave a voicemail: [] Yes [x] No    Rafa Silverman Rep   01/19/23 13:07 EST

## 2023-01-22 VITALS
HEART RATE: 101 BPM | SYSTOLIC BLOOD PRESSURE: 155 MMHG | OXYGEN SATURATION: 95 % | TEMPERATURE: 97.4 F | DIASTOLIC BLOOD PRESSURE: 94 MMHG | RESPIRATION RATE: 16 BRPM

## 2023-01-23 ENCOUNTER — HOSPITAL ENCOUNTER (OUTPATIENT)
Facility: HOSPITAL | Age: 78
Setting detail: OBSERVATION
Discharge: HOME | End: 2023-01-26
Attending: EMERGENCY MEDICINE | Admitting: INTERNAL MEDICINE
Payer: MEDICARE

## 2023-01-23 ENCOUNTER — TELEPHONE (OUTPATIENT)
Dept: CARDIOLOGY | Facility: CLINIC | Age: 78
End: 2023-01-23
Payer: MEDICARE

## 2023-01-23 ENCOUNTER — APPOINTMENT (OUTPATIENT)
Dept: GENERAL RADIOLOGY | Facility: HOSPITAL | Age: 78
End: 2023-01-23
Payer: MEDICARE

## 2023-01-23 VITALS
OXYGEN SATURATION: 94 % | RESPIRATION RATE: 20 BRPM | HEART RATE: 81 BPM | DIASTOLIC BLOOD PRESSURE: 88 MMHG | SYSTOLIC BLOOD PRESSURE: 153 MMHG | TEMPERATURE: 96 F

## 2023-01-23 DIAGNOSIS — J44.1 COPD WITH ACUTE EXACERBATION: Primary | ICD-10-CM

## 2023-01-23 DIAGNOSIS — D64.9 ANEMIA, UNSPECIFIED TYPE: ICD-10-CM

## 2023-01-23 DIAGNOSIS — J96.22 ACUTE ON CHRONIC RESPIRATORY FAILURE WITH HYPERCAPNIA: ICD-10-CM

## 2023-01-23 PROBLEM — J96.21 ACUTE ON CHRONIC RESPIRATORY FAILURE WITH HYPOXIA AND HYPERCAPNIA: Status: ACTIVE | Noted: 2023-01-23

## 2023-01-23 LAB
A-A DO2: ABNORMAL
ALBUMIN SERPL-MCNC: 4 G/DL (ref 3.5–5.2)
ALBUMIN/GLOB SERPL: 1.7 G/DL
ALP SERPL-CCNC: 65 U/L (ref 39–117)
ALT SERPL W P-5'-P-CCNC: 11 U/L (ref 1–33)
ANION GAP SERPL CALCULATED.3IONS-SCNC: 7.3 MMOL/L (ref 5–15)
ARTERIAL PATENCY WRIST A: POSITIVE
AST SERPL-CCNC: 19 U/L (ref 1–32)
ATMOSPHERIC PRESS: 730 MMHG
B PARAPERT DNA SPEC QL NAA+PROBE: NOT DETECTED
B PERT DNA SPEC QL NAA+PROBE: NOT DETECTED
BASE EXCESS BLDA CALC-SCNC: 8.7 MMOL/L (ref 0–2)
BASOPHILS # BLD AUTO: 0.07 10*3/MM3 (ref 0–0.2)
BASOPHILS NFR BLD AUTO: 0.9 % (ref 0–1.5)
BDY SITE: ABNORMAL
BILIRUB SERPL-MCNC: <0.2 MG/DL (ref 0–1.2)
BUN SERPL-MCNC: 18 MG/DL (ref 8–23)
BUN/CREAT SERPL: 35.3 (ref 7–25)
C PNEUM DNA NPH QL NAA+NON-PROBE: NOT DETECTED
CALCIUM SPEC-SCNC: 8.4 MG/DL (ref 8.6–10.5)
CHLORIDE SERPL-SCNC: 101 MMOL/L (ref 98–107)
CO2 SERPL-SCNC: 32.7 MMOL/L (ref 22–29)
COHGB MFR BLD: 0.9 % (ref 0–2)
CREAT SERPL-MCNC: 0.51 MG/DL (ref 0.57–1)
D DIMER PPP FEU-MCNC: 0.33 MCGFEU/ML (ref 0–0.75)
D-LACTATE SERPL-SCNC: 1.2 MMOL/L (ref 0.5–2)
DEPRECATED RDW RBC AUTO: 45.5 FL (ref 37–54)
EGFRCR SERPLBLD CKD-EPI 2021: 97.5 ML/MIN/1.73
EOSINOPHIL # BLD AUTO: 0.08 10*3/MM3 (ref 0–0.4)
EOSINOPHIL NFR BLD AUTO: 1 % (ref 0.3–6.2)
ERYTHROCYTE [DISTWIDTH] IN BLOOD BY AUTOMATED COUNT: 15.9 % (ref 12.3–15.4)
FLUAV SUBTYP SPEC NAA+PROBE: NOT DETECTED
FLUBV RNA ISLT QL NAA+PROBE: NOT DETECTED
GAS FLOW AIRWAY: 3 LPM
GLOBULIN UR ELPH-MCNC: 2.4 GM/DL
GLUCOSE SERPL-MCNC: 102 MG/DL (ref 65–99)
HADV DNA SPEC NAA+PROBE: NOT DETECTED
HCO3 BLDA-SCNC: 36 MMOL/L (ref 22–28)
HCOV 229E RNA SPEC QL NAA+PROBE: NOT DETECTED
HCOV HKU1 RNA SPEC QL NAA+PROBE: NOT DETECTED
HCOV NL63 RNA SPEC QL NAA+PROBE: NOT DETECTED
HCOV OC43 RNA SPEC QL NAA+PROBE: NOT DETECTED
HCT VFR BLD AUTO: 31 % (ref 34–46.6)
HCT VFR BLD CALC: 26.1 %
HGB BLD-MCNC: 8.6 G/DL (ref 12–15.9)
HMPV RNA NPH QL NAA+NON-PROBE: NOT DETECTED
HOLD SPECIMEN: NORMAL
HOLD SPECIMEN: NORMAL
HPIV1 RNA ISLT QL NAA+PROBE: NOT DETECTED
HPIV2 RNA SPEC QL NAA+PROBE: NOT DETECTED
HPIV3 RNA NPH QL NAA+PROBE: NOT DETECTED
HPIV4 P GENE NPH QL NAA+PROBE: NOT DETECTED
HYPOCHROMIA BLD QL: NORMAL
IMM GRANULOCYTES # BLD AUTO: 0.03 10*3/MM3 (ref 0–0.05)
IMM GRANULOCYTES NFR BLD AUTO: 0.4 % (ref 0–0.5)
IRON 24H UR-MRATE: 14 MCG/DL (ref 37–145)
IRON SATN MFR SERPL: 3 % (ref 20–50)
LYMPHOCYTES # BLD AUTO: 1.53 10*3/MM3 (ref 0.7–3.1)
LYMPHOCYTES NFR BLD AUTO: 18.8 % (ref 19.6–45.3)
Lab: ABNORMAL
Lab: ABNORMAL
M PNEUMO IGG SER IA-ACNC: NOT DETECTED
MCH RBC QN AUTO: 21.7 PG (ref 26.6–33)
MCHC RBC AUTO-ENTMCNC: 27.7 G/DL (ref 31.5–35.7)
MCV RBC AUTO: 78.1 FL (ref 79–97)
METHGB BLD QL: 0.9 % (ref 0–1.5)
MODALITY: ABNORMAL
MONOCYTES # BLD AUTO: 0.66 10*3/MM3 (ref 0.1–0.9)
MONOCYTES NFR BLD AUTO: 8.1 % (ref 5–12)
NEUTROPHILS NFR BLD AUTO: 5.79 10*3/MM3 (ref 1.7–7)
NEUTROPHILS NFR BLD AUTO: 70.8 % (ref 42.7–76)
NOTE: ABNORMAL
NOTIFIED BY: ABNORMAL
NOTIFIED WHO: ABNORMAL
NRBC BLD AUTO-RTO: 0 /100 WBC (ref 0–0.2)
NT-PROBNP SERPL-MCNC: 1182 PG/ML (ref 0–1800)
OXYHGB MFR BLDV: 97.8 % (ref 94–99)
PCO2 BLDA: 67.6 MM HG (ref 35–45)
PCO2 TEMP ADJ BLD: ABNORMAL MM[HG]
PH BLDA: 7.33 PH UNITS (ref 7.35–7.45)
PH, TEMP CORRECTED: ABNORMAL
PLATELET # BLD AUTO: 361 10*3/MM3 (ref 140–450)
PMV BLD AUTO: 10 FL (ref 6–12)
PO2 BLDA: 156 MM HG (ref 75–100)
PO2 TEMP ADJ BLD: ABNORMAL MM[HG]
POTASSIUM SERPL-SCNC: 3.7 MMOL/L (ref 3.5–5.2)
PROCALCITONIN SERPL-MCNC: 0.05 NG/ML (ref 0–0.25)
PROT SERPL-MCNC: 6.4 G/DL (ref 6–8.5)
RBC # BLD AUTO: 3.97 10*6/MM3 (ref 3.77–5.28)
RHINOVIRUS RNA SPEC NAA+PROBE: NOT DETECTED
RSV RNA NPH QL NAA+NON-PROBE: NOT DETECTED
SAO2 % BLDCOA: 99.6 % (ref 94–100)
SARS-COV-2 RNA NPH QL NAA+NON-PROBE: NOT DETECTED
SMALL PLATELETS BLD QL SMEAR: ADEQUATE
SODIUM SERPL-SCNC: 141 MMOL/L (ref 136–145)
TIBC SERPL-MCNC: 510 MCG/DL (ref 298–536)
TRANSFERRIN SERPL-MCNC: 342 MG/DL (ref 200–360)
TROPONIN T SERPL-MCNC: <0.01 NG/ML (ref 0–0.03)
VENTILATOR MODE: ABNORMAL
WBC MORPH BLD: NORMAL
WBC NRBC COR # BLD: 8.16 10*3/MM3 (ref 3.4–10.8)
WHOLE BLOOD HOLD COAG: NORMAL
WHOLE BLOOD HOLD SPECIMEN: NORMAL

## 2023-01-23 PROCEDURE — 94799 UNLISTED PULMONARY SVC/PX: CPT

## 2023-01-23 PROCEDURE — 85007 BL SMEAR W/DIFF WBC COUNT: CPT | Performed by: EMERGENCY MEDICINE

## 2023-01-23 PROCEDURE — 83050 HGB METHEMOGLOBIN QUAN: CPT

## 2023-01-23 PROCEDURE — 85379 FIBRIN DEGRADATION QUANT: CPT | Performed by: EMERGENCY MEDICINE

## 2023-01-23 PROCEDURE — G0378 HOSPITAL OBSERVATION PER HR: HCPCS

## 2023-01-23 PROCEDURE — 84484 ASSAY OF TROPONIN QUANT: CPT | Performed by: EMERGENCY MEDICINE

## 2023-01-23 PROCEDURE — 25010000002 FUROSEMIDE PER 20 MG: Performed by: FAMILY MEDICINE

## 2023-01-23 PROCEDURE — 97162 PT EVAL MOD COMPLEX 30 MIN: CPT

## 2023-01-23 PROCEDURE — 36415 COLL VENOUS BLD VENIPUNCTURE: CPT

## 2023-01-23 PROCEDURE — 96376 TX/PRO/DX INJ SAME DRUG ADON: CPT

## 2023-01-23 PROCEDURE — 25010000002 METHYLPREDNISOLONE PER 40 MG: Performed by: FAMILY MEDICINE

## 2023-01-23 PROCEDURE — 94664 DEMO&/EVAL PT USE INHALER: CPT

## 2023-01-23 PROCEDURE — 36600 WITHDRAWAL OF ARTERIAL BLOOD: CPT

## 2023-01-23 PROCEDURE — 83540 ASSAY OF IRON: CPT | Performed by: FAMILY MEDICINE

## 2023-01-23 PROCEDURE — 82805 BLOOD GASES W/O2 SATURATION: CPT

## 2023-01-23 PROCEDURE — 99285 EMERGENCY DEPT VISIT HI MDM: CPT

## 2023-01-23 PROCEDURE — 83880 ASSAY OF NATRIURETIC PEPTIDE: CPT | Performed by: EMERGENCY MEDICINE

## 2023-01-23 PROCEDURE — 0202U NFCT DS 22 TRGT SARS-COV-2: CPT | Performed by: EMERGENCY MEDICINE

## 2023-01-23 PROCEDURE — 80053 COMPREHEN METABOLIC PANEL: CPT | Performed by: EMERGENCY MEDICINE

## 2023-01-23 PROCEDURE — 83605 ASSAY OF LACTIC ACID: CPT | Performed by: EMERGENCY MEDICINE

## 2023-01-23 PROCEDURE — 99223 1ST HOSP IP/OBS HIGH 75: CPT | Performed by: FAMILY MEDICINE

## 2023-01-23 PROCEDURE — 84145 PROCALCITONIN (PCT): CPT | Performed by: EMERGENCY MEDICINE

## 2023-01-23 PROCEDURE — 71045 X-RAY EXAM CHEST 1 VIEW: CPT

## 2023-01-23 PROCEDURE — 25010000002 METHYLPREDNISOLONE PER 125 MG: Performed by: EMERGENCY MEDICINE

## 2023-01-23 PROCEDURE — 97165 OT EVAL LOW COMPLEX 30 MIN: CPT

## 2023-01-23 PROCEDURE — 25010000002 ONDANSETRON PER 1 MG: Performed by: FAMILY MEDICINE

## 2023-01-23 PROCEDURE — 93005 ELECTROCARDIOGRAM TRACING: CPT | Performed by: EMERGENCY MEDICINE

## 2023-01-23 PROCEDURE — 84466 ASSAY OF TRANSFERRIN: CPT | Performed by: FAMILY MEDICINE

## 2023-01-23 PROCEDURE — 96375 TX/PRO/DX INJ NEW DRUG ADDON: CPT

## 2023-01-23 PROCEDURE — 94640 AIRWAY INHALATION TREATMENT: CPT

## 2023-01-23 PROCEDURE — 96374 THER/PROPH/DIAG INJ IV PUSH: CPT

## 2023-01-23 PROCEDURE — 85025 COMPLETE CBC W/AUTO DIFF WBC: CPT | Performed by: EMERGENCY MEDICINE

## 2023-01-23 PROCEDURE — 82375 ASSAY CARBOXYHB QUANT: CPT

## 2023-01-23 PROCEDURE — 94761 N-INVAS EAR/PLS OXIMETRY MLT: CPT

## 2023-01-23 RX ORDER — IPRATROPIUM BROMIDE AND ALBUTEROL SULFATE 2.5; .5 MG/3ML; MG/3ML
3 SOLUTION RESPIRATORY (INHALATION)
Status: DISCONTINUED | OUTPATIENT
Start: 2023-01-23 | End: 2023-01-26 | Stop reason: HOSPADM

## 2023-01-23 RX ORDER — FUROSEMIDE 10 MG/ML
40 INJECTION INTRAMUSCULAR; INTRAVENOUS ONCE
Status: COMPLETED | OUTPATIENT
Start: 2023-01-23 | End: 2023-01-23

## 2023-01-23 RX ORDER — CLONAZEPAM 0.5 MG/1
1 TABLET ORAL DAILY PRN
Status: DISCONTINUED | OUTPATIENT
Start: 2023-01-23 | End: 2023-01-26 | Stop reason: HOSPADM

## 2023-01-23 RX ORDER — IPRATROPIUM BROMIDE AND ALBUTEROL SULFATE 2.5; .5 MG/3ML; MG/3ML
3 SOLUTION RESPIRATORY (INHALATION) ONCE
Status: COMPLETED | OUTPATIENT
Start: 2023-01-23 | End: 2023-01-23

## 2023-01-23 RX ORDER — ACETAMINOPHEN 160 MG/5ML
650 SOLUTION ORAL EVERY 4 HOURS PRN
Status: DISCONTINUED | OUTPATIENT
Start: 2023-01-23 | End: 2023-01-26 | Stop reason: HOSPADM

## 2023-01-23 RX ORDER — BUDESONIDE 0.5 MG/2ML
0.5 INHALANT ORAL
Status: DISCONTINUED | OUTPATIENT
Start: 2023-01-23 | End: 2023-01-26 | Stop reason: HOSPADM

## 2023-01-23 RX ORDER — SODIUM CHLORIDE 9 MG/ML
40 INJECTION, SOLUTION INTRAVENOUS AS NEEDED
Status: DISCONTINUED | OUTPATIENT
Start: 2023-01-23 | End: 2023-01-26 | Stop reason: HOSPADM

## 2023-01-23 RX ORDER — BENZONATATE 100 MG/1
100 CAPSULE ORAL 3 TIMES DAILY PRN
Status: DISCONTINUED | OUTPATIENT
Start: 2023-01-23 | End: 2023-01-26 | Stop reason: HOSPADM

## 2023-01-23 RX ORDER — ALBUTEROL SULFATE 90 UG/1
2 AEROSOL, METERED RESPIRATORY (INHALATION) ONCE
Status: COMPLETED | OUTPATIENT
Start: 2023-01-23 | End: 2023-01-23

## 2023-01-23 RX ORDER — CHOLECALCIFEROL (VITAMIN D3) 125 MCG
5 CAPSULE ORAL NIGHTLY PRN
Status: DISCONTINUED | OUTPATIENT
Start: 2023-01-23 | End: 2023-01-26 | Stop reason: HOSPADM

## 2023-01-23 RX ORDER — SODIUM CHLORIDE 0.9 % (FLUSH) 0.9 %
10 SYRINGE (ML) INJECTION EVERY 12 HOURS SCHEDULED
Status: DISCONTINUED | OUTPATIENT
Start: 2023-01-23 | End: 2023-01-26 | Stop reason: HOSPADM

## 2023-01-23 RX ORDER — PANTOPRAZOLE SODIUM 40 MG/1
40 TABLET, DELAYED RELEASE ORAL DAILY
Status: DISCONTINUED | OUTPATIENT
Start: 2023-01-23 | End: 2023-01-26 | Stop reason: HOSPADM

## 2023-01-23 RX ORDER — ALUMINA, MAGNESIA, AND SIMETHICONE 2400; 2400; 240 MG/30ML; MG/30ML; MG/30ML
15 SUSPENSION ORAL EVERY 6 HOURS PRN
Status: DISCONTINUED | OUTPATIENT
Start: 2023-01-23 | End: 2023-01-26 | Stop reason: HOSPADM

## 2023-01-23 RX ORDER — POTASSIUM CHLORIDE 750 MG/1
40 CAPSULE, EXTENDED RELEASE ORAL ONCE
Status: COMPLETED | OUTPATIENT
Start: 2023-01-23 | End: 2023-01-23

## 2023-01-23 RX ORDER — ONDANSETRON 4 MG/1
4 TABLET, FILM COATED ORAL EVERY 6 HOURS PRN
Status: DISCONTINUED | OUTPATIENT
Start: 2023-01-23 | End: 2023-01-26 | Stop reason: HOSPADM

## 2023-01-23 RX ORDER — ONDANSETRON 2 MG/ML
4 INJECTION INTRAMUSCULAR; INTRAVENOUS EVERY 6 HOURS PRN
Status: DISCONTINUED | OUTPATIENT
Start: 2023-01-23 | End: 2023-01-26 | Stop reason: HOSPADM

## 2023-01-23 RX ORDER — FLUTICASONE PROPIONATE 50 MCG
2 SPRAY, SUSPENSION (ML) NASAL DAILY
Status: DISCONTINUED | OUTPATIENT
Start: 2023-01-23 | End: 2023-01-26 | Stop reason: HOSPADM

## 2023-01-23 RX ORDER — TRAZODONE HYDROCHLORIDE 50 MG/1
100 TABLET ORAL NIGHTLY PRN
Status: DISCONTINUED | OUTPATIENT
Start: 2023-01-23 | End: 2023-01-26 | Stop reason: HOSPADM

## 2023-01-23 RX ORDER — IPRATROPIUM BROMIDE AND ALBUTEROL SULFATE 2.5; .5 MG/3ML; MG/3ML
SOLUTION RESPIRATORY (INHALATION)
Status: COMPLETED
Start: 2023-01-23 | End: 2023-01-23

## 2023-01-23 RX ORDER — METHYLPREDNISOLONE SODIUM SUCCINATE 125 MG/2ML
125 INJECTION, POWDER, LYOPHILIZED, FOR SOLUTION INTRAMUSCULAR; INTRAVENOUS ONCE
Status: COMPLETED | OUTPATIENT
Start: 2023-01-23 | End: 2023-01-23

## 2023-01-23 RX ORDER — ALPRAZOLAM 0.5 MG/1
0.5 TABLET ORAL EVERY 8 HOURS PRN
Status: DISCONTINUED | OUTPATIENT
Start: 2023-01-23 | End: 2023-01-26 | Stop reason: HOSPADM

## 2023-01-23 RX ORDER — ACETAMINOPHEN 325 MG/1
650 TABLET ORAL EVERY 4 HOURS PRN
Status: DISCONTINUED | OUTPATIENT
Start: 2023-01-23 | End: 2023-01-26 | Stop reason: HOSPADM

## 2023-01-23 RX ORDER — DULOXETIN HYDROCHLORIDE 30 MG/1
60 CAPSULE, DELAYED RELEASE ORAL 2 TIMES DAILY
Status: DISCONTINUED | OUTPATIENT
Start: 2023-01-23 | End: 2023-01-26 | Stop reason: HOSPADM

## 2023-01-23 RX ORDER — OXYCODONE AND ACETAMINOPHEN 7.5; 325 MG/1; MG/1
1 TABLET ORAL EVERY 6 HOURS PRN
Status: DISCONTINUED | OUTPATIENT
Start: 2023-01-23 | End: 2023-01-26 | Stop reason: HOSPADM

## 2023-01-23 RX ORDER — SODIUM CHLORIDE 0.9 % (FLUSH) 0.9 %
10 SYRINGE (ML) INJECTION AS NEEDED
Status: DISCONTINUED | OUTPATIENT
Start: 2023-01-23 | End: 2023-01-26 | Stop reason: HOSPADM

## 2023-01-23 RX ORDER — ATORVASTATIN CALCIUM 20 MG/1
20 TABLET, FILM COATED ORAL DAILY
Status: DISCONTINUED | OUTPATIENT
Start: 2023-01-23 | End: 2023-01-26 | Stop reason: HOSPADM

## 2023-01-23 RX ORDER — METHYLPREDNISOLONE SODIUM SUCCINATE 40 MG/ML
40 INJECTION, POWDER, LYOPHILIZED, FOR SOLUTION INTRAMUSCULAR; INTRAVENOUS EVERY 8 HOURS
Status: DISCONTINUED | OUTPATIENT
Start: 2023-01-23 | End: 2023-01-26 | Stop reason: HOSPADM

## 2023-01-23 RX ORDER — ACETAMINOPHEN 650 MG/1
650 SUPPOSITORY RECTAL EVERY 4 HOURS PRN
Status: DISCONTINUED | OUTPATIENT
Start: 2023-01-23 | End: 2023-01-26 | Stop reason: HOSPADM

## 2023-01-23 RX ADMIN — OXYCODONE HYDROCHLORIDE AND ACETAMINOPHEN 1 TABLET: 7.5; 325 TABLET ORAL at 12:39

## 2023-01-23 RX ADMIN — METHYLPREDNISOLONE SODIUM SUCCINATE 40 MG: 40 INJECTION, POWDER, FOR SOLUTION INTRAMUSCULAR; INTRAVENOUS at 09:20

## 2023-01-23 RX ADMIN — APIXABAN 5 MG: 5 TABLET, FILM COATED ORAL at 09:21

## 2023-01-23 RX ADMIN — IPRATROPIUM BROMIDE AND ALBUTEROL SULFATE 3 ML: 2.5; .5 SOLUTION RESPIRATORY (INHALATION) at 12:11

## 2023-01-23 RX ADMIN — FLUTICASONE PROPIONATE 2 SPRAY: 50 SPRAY, METERED NASAL at 11:09

## 2023-01-23 RX ADMIN — BUDESONIDE 0.5 MG: 0.5 SUSPENSION RESPIRATORY (INHALATION) at 06:57

## 2023-01-23 RX ADMIN — SERTRALINE 150 MG: 50 TABLET, FILM COATED ORAL at 09:21

## 2023-01-23 RX ADMIN — ONDANSETRON 4 MG: 2 INJECTION INTRAMUSCULAR; INTRAVENOUS at 15:09

## 2023-01-23 RX ADMIN — DULOXETINE HYDROCHLORIDE 60 MG: 30 CAPSULE, DELAYED RELEASE ORAL at 09:21

## 2023-01-23 RX ADMIN — METHYLPREDNISOLONE SODIUM SUCCINATE 40 MG: 40 INJECTION, POWDER, FOR SOLUTION INTRAMUSCULAR; INTRAVENOUS at 18:01

## 2023-01-23 RX ADMIN — Medication 10 ML: at 09:42

## 2023-01-23 RX ADMIN — IPRATROPIUM BROMIDE AND ALBUTEROL SULFATE 3 ML: 2.5; .5 SOLUTION RESPIRATORY (INHALATION) at 06:58

## 2023-01-23 RX ADMIN — IPRATROPIUM BROMIDE AND ALBUTEROL SULFATE 3 ML: .5; 3 SOLUTION RESPIRATORY (INHALATION) at 07:23

## 2023-01-23 RX ADMIN — ACETAMINOPHEN 650 MG: 325 TABLET, FILM COATED ORAL at 15:16

## 2023-01-23 RX ADMIN — CLONAZEPAM 1 MG: 0.5 TABLET ORAL at 18:01

## 2023-01-23 RX ADMIN — POTASSIUM CHLORIDE 40 MEQ: 10 CAPSULE, COATED, EXTENDED RELEASE ORAL at 02:32

## 2023-01-23 RX ADMIN — METHYLPREDNISOLONE SODIUM SUCCINATE 125 MG: 125 INJECTION, POWDER, FOR SOLUTION INTRAMUSCULAR; INTRAVENOUS at 01:11

## 2023-01-23 RX ADMIN — POTASSIUM CHLORIDE 40 MEQ: 10 CAPSULE, COATED, EXTENDED RELEASE ORAL at 09:21

## 2023-01-23 RX ADMIN — FUROSEMIDE 40 MG: 10 INJECTION, SOLUTION INTRAMUSCULAR; INTRAVENOUS at 02:31

## 2023-01-23 RX ADMIN — ALBUTEROL SULFATE 2 PUFF: 90 AEROSOL, METERED RESPIRATORY (INHALATION) at 01:11

## 2023-01-23 RX ADMIN — BUDESONIDE 0.5 MG: 0.5 SUSPENSION RESPIRATORY (INHALATION) at 19:53

## 2023-01-23 RX ADMIN — PANTOPRAZOLE SODIUM 40 MG: 40 TABLET, DELAYED RELEASE ORAL at 09:21

## 2023-01-23 RX ADMIN — ALUMINUM HYDROXIDE, MAGNESIUM HYDROXIDE, AND DIMETHICONE 15 ML: 400; 400; 40 SUSPENSION ORAL at 14:17

## 2023-01-23 RX ADMIN — ATORVASTATIN CALCIUM 20 MG: 20 TABLET, FILM COATED ORAL at 09:21

## 2023-01-23 RX ADMIN — IPRATROPIUM BROMIDE AND ALBUTEROL SULFATE 3 ML: 2.5; .5 SOLUTION RESPIRATORY (INHALATION) at 19:53

## 2023-01-23 RX ADMIN — TRAZODONE HYDROCHLORIDE 100 MG: 50 TABLET ORAL at 21:36

## 2023-01-23 RX ADMIN — IPRATROPIUM BROMIDE AND ALBUTEROL SULFATE 3 ML: 2.5; .5 SOLUTION RESPIRATORY (INHALATION) at 07:23

## 2023-01-23 RX ADMIN — DULOXETINE HYDROCHLORIDE 60 MG: 30 CAPSULE, DELAYED RELEASE ORAL at 21:35

## 2023-01-23 RX ADMIN — Medication 10 ML: at 22:12

## 2023-01-23 RX ADMIN — IPRATROPIUM BROMIDE AND ALBUTEROL SULFATE 3 ML: 2.5; .5 SOLUTION RESPIRATORY (INHALATION) at 02:49

## 2023-01-23 RX ADMIN — OXYCODONE HYDROCHLORIDE AND ACETAMINOPHEN 1 TABLET: 7.5; 325 TABLET ORAL at 21:36

## 2023-01-23 RX ADMIN — APIXABAN 5 MG: 5 TABLET, FILM COATED ORAL at 21:35

## 2023-01-24 PROBLEM — D50.9 IRON DEFICIENCY ANEMIA: Status: ACTIVE | Noted: 2023-01-24

## 2023-01-24 PROBLEM — Z74.09 IMPAIRED MOBILITY AND ADLS: Status: ACTIVE | Noted: 2023-01-24

## 2023-01-24 PROBLEM — Z78.9 IMPAIRED MOBILITY AND ADLS: Status: ACTIVE | Noted: 2023-01-24

## 2023-01-24 LAB
ANION GAP SERPL CALCULATED.3IONS-SCNC: 4.3 MMOL/L (ref 5–15)
ANISOCYTOSIS BLD QL: NORMAL
BASOPHILS # BLD AUTO: 0 10*3/MM3 (ref 0–0.2)
BASOPHILS NFR BLD AUTO: 0 % (ref 0–1.5)
BUN SERPL-MCNC: 31 MG/DL (ref 8–23)
BUN/CREAT SERPL: 52.5 (ref 7–25)
CALCIUM SPEC-SCNC: 9.1 MG/DL (ref 8.6–10.5)
CHLORIDE SERPL-SCNC: 104 MMOL/L (ref 98–107)
CO2 SERPL-SCNC: 35.7 MMOL/L (ref 22–29)
CREAT SERPL-MCNC: 0.59 MG/DL (ref 0.57–1)
DEPRECATED RDW RBC AUTO: 45.5 FL (ref 37–54)
EGFRCR SERPLBLD CKD-EPI 2021: 94.1 ML/MIN/1.73
ELLIPTOCYTES BLD QL SMEAR: NORMAL
EOSINOPHIL # BLD AUTO: 0 10*3/MM3 (ref 0–0.4)
EOSINOPHIL NFR BLD AUTO: 0 % (ref 0.3–6.2)
ERYTHROCYTE [DISTWIDTH] IN BLOOD BY AUTOMATED COUNT: 16 % (ref 12.3–15.4)
GLUCOSE SERPL-MCNC: 163 MG/DL (ref 65–99)
HCT VFR BLD AUTO: 31.1 % (ref 34–46.6)
HGB BLD-MCNC: 8.6 G/DL (ref 12–15.9)
HYPOCHROMIA BLD QL: NORMAL
IMM GRANULOCYTES # BLD AUTO: 0.04 10*3/MM3 (ref 0–0.05)
IMM GRANULOCYTES NFR BLD AUTO: 0.5 % (ref 0–0.5)
LYMPHOCYTES # BLD AUTO: 0.48 10*3/MM3 (ref 0.7–3.1)
LYMPHOCYTES NFR BLD AUTO: 5.6 % (ref 19.6–45.3)
MCH RBC QN AUTO: 21.6 PG (ref 26.6–33)
MCHC RBC AUTO-ENTMCNC: 27.7 G/DL (ref 31.5–35.7)
MCV RBC AUTO: 77.9 FL (ref 79–97)
MONOCYTES # BLD AUTO: 0.21 10*3/MM3 (ref 0.1–0.9)
MONOCYTES NFR BLD AUTO: 2.4 % (ref 5–12)
NEUTROPHILS NFR BLD AUTO: 7.88 10*3/MM3 (ref 1.7–7)
NEUTROPHILS NFR BLD AUTO: 91.5 % (ref 42.7–76)
NRBC BLD AUTO-RTO: 0 /100 WBC (ref 0–0.2)
PLATELET # BLD AUTO: 319 10*3/MM3 (ref 140–450)
PMV BLD AUTO: 9.2 FL (ref 6–12)
POIKILOCYTOSIS BLD QL SMEAR: NORMAL
POTASSIUM SERPL-SCNC: 4.8 MMOL/L (ref 3.5–5.2)
RBC # BLD AUTO: 3.99 10*6/MM3 (ref 3.77–5.28)
SMALL PLATELETS BLD QL SMEAR: ADEQUATE
SODIUM SERPL-SCNC: 144 MMOL/L (ref 136–145)
WBC MORPH BLD: NORMAL
WBC NRBC COR # BLD: 8.61 10*3/MM3 (ref 3.4–10.8)

## 2023-01-24 PROCEDURE — G0378 HOSPITAL OBSERVATION PER HR: HCPCS

## 2023-01-24 PROCEDURE — 94799 UNLISTED PULMONARY SVC/PX: CPT

## 2023-01-24 PROCEDURE — 94664 DEMO&/EVAL PT USE INHALER: CPT

## 2023-01-24 PROCEDURE — 80048 BASIC METABOLIC PNL TOTAL CA: CPT | Performed by: INTERNAL MEDICINE

## 2023-01-24 PROCEDURE — 25010000002 METHYLPREDNISOLONE PER 40 MG: Performed by: FAMILY MEDICINE

## 2023-01-24 PROCEDURE — 96376 TX/PRO/DX INJ SAME DRUG ADON: CPT

## 2023-01-24 PROCEDURE — 94761 N-INVAS EAR/PLS OXIMETRY MLT: CPT

## 2023-01-24 PROCEDURE — 85007 BL SMEAR W/DIFF WBC COUNT: CPT | Performed by: INTERNAL MEDICINE

## 2023-01-24 PROCEDURE — 99232 SBSQ HOSP IP/OBS MODERATE 35: CPT | Performed by: INTERNAL MEDICINE

## 2023-01-24 PROCEDURE — 85025 COMPLETE CBC W/AUTO DIFF WBC: CPT | Performed by: INTERNAL MEDICINE

## 2023-01-24 PROCEDURE — 97116 GAIT TRAINING THERAPY: CPT

## 2023-01-24 RX ORDER — FERROUS SULFATE TAB EC 324 MG (65 MG FE EQUIVALENT) 324 (65 FE) MG
324 TABLET DELAYED RESPONSE ORAL
Status: DISCONTINUED | OUTPATIENT
Start: 2023-01-25 | End: 2023-01-26 | Stop reason: HOSPADM

## 2023-01-24 RX ADMIN — METHYLPREDNISOLONE SODIUM SUCCINATE 40 MG: 40 INJECTION, POWDER, FOR SOLUTION INTRAMUSCULAR; INTRAVENOUS at 17:41

## 2023-01-24 RX ADMIN — TRAZODONE HYDROCHLORIDE 100 MG: 50 TABLET ORAL at 21:23

## 2023-01-24 RX ADMIN — BUDESONIDE 0.5 MG: 0.5 SUSPENSION RESPIRATORY (INHALATION) at 06:52

## 2023-01-24 RX ADMIN — Medication 10 ML: at 09:26

## 2023-01-24 RX ADMIN — SERTRALINE 150 MG: 50 TABLET, FILM COATED ORAL at 09:25

## 2023-01-24 RX ADMIN — APIXABAN 5 MG: 5 TABLET, FILM COATED ORAL at 09:25

## 2023-01-24 RX ADMIN — FLUTICASONE PROPIONATE 2 SPRAY: 50 SPRAY, METERED NASAL at 12:26

## 2023-01-24 RX ADMIN — CLONAZEPAM 1 MG: 0.5 TABLET ORAL at 21:30

## 2023-01-24 RX ADMIN — METHYLPREDNISOLONE SODIUM SUCCINATE 40 MG: 40 INJECTION, POWDER, FOR SOLUTION INTRAMUSCULAR; INTRAVENOUS at 09:25

## 2023-01-24 RX ADMIN — DULOXETINE HYDROCHLORIDE 60 MG: 30 CAPSULE, DELAYED RELEASE ORAL at 09:25

## 2023-01-24 RX ADMIN — IPRATROPIUM BROMIDE AND ALBUTEROL SULFATE 3 ML: 2.5; .5 SOLUTION RESPIRATORY (INHALATION) at 12:25

## 2023-01-24 RX ADMIN — PANTOPRAZOLE SODIUM 40 MG: 40 TABLET, DELAYED RELEASE ORAL at 09:25

## 2023-01-24 RX ADMIN — ALPRAZOLAM 0.5 MG: 0.5 TABLET ORAL at 12:26

## 2023-01-24 RX ADMIN — METHYLPREDNISOLONE SODIUM SUCCINATE 40 MG: 40 INJECTION, POWDER, FOR SOLUTION INTRAMUSCULAR; INTRAVENOUS at 00:06

## 2023-01-24 RX ADMIN — ATORVASTATIN CALCIUM 20 MG: 20 TABLET, FILM COATED ORAL at 09:25

## 2023-01-24 RX ADMIN — OXYCODONE HYDROCHLORIDE AND ACETAMINOPHEN 1 TABLET: 7.5; 325 TABLET ORAL at 21:24

## 2023-01-24 RX ADMIN — IPRATROPIUM BROMIDE AND ALBUTEROL SULFATE 3 ML: 2.5; .5 SOLUTION RESPIRATORY (INHALATION) at 06:52

## 2023-01-24 RX ADMIN — APIXABAN 5 MG: 5 TABLET, FILM COATED ORAL at 21:24

## 2023-01-24 RX ADMIN — DULOXETINE HYDROCHLORIDE 60 MG: 30 CAPSULE, DELAYED RELEASE ORAL at 21:24

## 2023-01-24 RX ADMIN — OXYCODONE HYDROCHLORIDE AND ACETAMINOPHEN 1 TABLET: 7.5; 325 TABLET ORAL at 09:25

## 2023-01-25 LAB
ANION GAP SERPL CALCULATED.3IONS-SCNC: 4.4 MMOL/L (ref 5–15)
BUN SERPL-MCNC: 34 MG/DL (ref 8–23)
BUN/CREAT SERPL: 54.8 (ref 7–25)
CALCIUM SPEC-SCNC: 8.8 MG/DL (ref 8.6–10.5)
CHLORIDE SERPL-SCNC: 102 MMOL/L (ref 98–107)
CO2 SERPL-SCNC: 35.6 MMOL/L (ref 22–29)
CREAT SERPL-MCNC: 0.62 MG/DL (ref 0.57–1)
DEPRECATED RDW RBC AUTO: 46.5 FL (ref 37–54)
EGFRCR SERPLBLD CKD-EPI 2021: 93 ML/MIN/1.73
ERYTHROCYTE [DISTWIDTH] IN BLOOD BY AUTOMATED COUNT: 16.2 % (ref 12.3–15.4)
GLUCOSE SERPL-MCNC: 156 MG/DL (ref 65–99)
HCT VFR BLD AUTO: 30.9 % (ref 34–46.6)
HGB BLD-MCNC: 8.9 G/DL (ref 12–15.9)
MCH RBC QN AUTO: 22.5 PG (ref 26.6–33)
MCHC RBC AUTO-ENTMCNC: 28.8 G/DL (ref 31.5–35.7)
MCV RBC AUTO: 78.2 FL (ref 79–97)
PLATELET # BLD AUTO: 333 10*3/MM3 (ref 140–450)
PMV BLD AUTO: 10.7 FL (ref 6–12)
POTASSIUM SERPL-SCNC: 4.5 MMOL/L (ref 3.5–5.2)
RBC # BLD AUTO: 3.95 10*6/MM3 (ref 3.77–5.28)
SODIUM SERPL-SCNC: 142 MMOL/L (ref 136–145)
WBC NRBC COR # BLD: 13.01 10*3/MM3 (ref 3.4–10.8)

## 2023-01-25 PROCEDURE — 94799 UNLISTED PULMONARY SVC/PX: CPT

## 2023-01-25 PROCEDURE — 80048 BASIC METABOLIC PNL TOTAL CA: CPT | Performed by: INTERNAL MEDICINE

## 2023-01-25 PROCEDURE — 96376 TX/PRO/DX INJ SAME DRUG ADON: CPT

## 2023-01-25 PROCEDURE — 97535 SELF CARE MNGMENT TRAINING: CPT

## 2023-01-25 PROCEDURE — 97530 THERAPEUTIC ACTIVITIES: CPT

## 2023-01-25 PROCEDURE — 94761 N-INVAS EAR/PLS OXIMETRY MLT: CPT

## 2023-01-25 PROCEDURE — 94664 DEMO&/EVAL PT USE INHALER: CPT

## 2023-01-25 PROCEDURE — 99232 SBSQ HOSP IP/OBS MODERATE 35: CPT | Performed by: INTERNAL MEDICINE

## 2023-01-25 PROCEDURE — 85027 COMPLETE CBC AUTOMATED: CPT | Performed by: INTERNAL MEDICINE

## 2023-01-25 PROCEDURE — G0378 HOSPITAL OBSERVATION PER HR: HCPCS

## 2023-01-25 PROCEDURE — 25010000002 METHYLPREDNISOLONE PER 40 MG: Performed by: FAMILY MEDICINE

## 2023-01-25 RX ADMIN — DULOXETINE HYDROCHLORIDE 60 MG: 30 CAPSULE, DELAYED RELEASE ORAL at 08:50

## 2023-01-25 RX ADMIN — Medication 10 ML: at 21:37

## 2023-01-25 RX ADMIN — FLUTICASONE PROPIONATE 2 SPRAY: 50 SPRAY, METERED NASAL at 08:50

## 2023-01-25 RX ADMIN — BUDESONIDE 0.5 MG: 0.5 SUSPENSION RESPIRATORY (INHALATION) at 06:48

## 2023-01-25 RX ADMIN — ALPRAZOLAM 0.5 MG: 0.5 TABLET ORAL at 21:38

## 2023-01-25 RX ADMIN — APIXABAN 5 MG: 5 TABLET, FILM COATED ORAL at 08:50

## 2023-01-25 RX ADMIN — APIXABAN 5 MG: 5 TABLET, FILM COATED ORAL at 21:37

## 2023-01-25 RX ADMIN — SERTRALINE 150 MG: 50 TABLET, FILM COATED ORAL at 08:50

## 2023-01-25 RX ADMIN — CLONAZEPAM 1 MG: 0.5 TABLET ORAL at 18:15

## 2023-01-25 RX ADMIN — ATORVASTATIN CALCIUM 20 MG: 20 TABLET, FILM COATED ORAL at 08:50

## 2023-01-25 RX ADMIN — METHYLPREDNISOLONE SODIUM SUCCINATE 40 MG: 40 INJECTION, POWDER, FOR SOLUTION INTRAMUSCULAR; INTRAVENOUS at 18:05

## 2023-01-25 RX ADMIN — METHYLPREDNISOLONE SODIUM SUCCINATE 40 MG: 40 INJECTION, POWDER, FOR SOLUTION INTRAMUSCULAR; INTRAVENOUS at 08:50

## 2023-01-25 RX ADMIN — ALPRAZOLAM 0.5 MG: 0.5 TABLET ORAL at 01:13

## 2023-01-25 RX ADMIN — IPRATROPIUM BROMIDE AND ALBUTEROL SULFATE 3 ML: 2.5; .5 SOLUTION RESPIRATORY (INHALATION) at 22:30

## 2023-01-25 RX ADMIN — IPRATROPIUM BROMIDE AND ALBUTEROL SULFATE 3 ML: 2.5; .5 SOLUTION RESPIRATORY (INHALATION) at 12:08

## 2023-01-25 RX ADMIN — PANTOPRAZOLE SODIUM 40 MG: 40 TABLET, DELAYED RELEASE ORAL at 08:50

## 2023-01-25 RX ADMIN — METHYLPREDNISOLONE SODIUM SUCCINATE 40 MG: 40 INJECTION, POWDER, FOR SOLUTION INTRAMUSCULAR; INTRAVENOUS at 00:03

## 2023-01-25 RX ADMIN — IPRATROPIUM BROMIDE AND ALBUTEROL SULFATE 3 ML: 2.5; .5 SOLUTION RESPIRATORY (INHALATION) at 06:48

## 2023-01-25 RX ADMIN — DULOXETINE HYDROCHLORIDE 60 MG: 30 CAPSULE, DELAYED RELEASE ORAL at 21:38

## 2023-01-25 RX ADMIN — ALUMINUM HYDROXIDE, MAGNESIUM HYDROXIDE, AND DIMETHICONE 15 ML: 400; 400; 40 SUSPENSION ORAL at 21:40

## 2023-01-25 RX ADMIN — Medication 10 ML: at 08:50

## 2023-01-25 RX ADMIN — BENZONATATE 100 MG: 100 CAPSULE ORAL at 01:13

## 2023-01-25 RX ADMIN — OXYCODONE HYDROCHLORIDE AND ACETAMINOPHEN 1 TABLET: 7.5; 325 TABLET ORAL at 13:16

## 2023-01-25 RX ADMIN — FERROUS SULFATE TAB EC 324 MG (65 MG FE EQUIVALENT) 324 MG: 324 (65 FE) TABLET DELAYED RESPONSE at 09:01

## 2023-01-26 ENCOUNTER — TELEPHONE (OUTPATIENT)
Dept: INTERNAL MEDICINE | Facility: CLINIC | Age: 78
End: 2023-01-26
Payer: MEDICARE

## 2023-01-26 ENCOUNTER — READMISSION MANAGEMENT (OUTPATIENT)
Dept: CALL CENTER | Facility: HOSPITAL | Age: 78
End: 2023-01-26
Payer: MEDICARE

## 2023-01-26 VITALS
TEMPERATURE: 98.2 F | DIASTOLIC BLOOD PRESSURE: 80 MMHG | HEART RATE: 100 BPM | HEIGHT: 66 IN | WEIGHT: 161.38 LBS | RESPIRATION RATE: 22 BRPM | OXYGEN SATURATION: 97 % | BODY MASS INDEX: 25.94 KG/M2 | SYSTOLIC BLOOD PRESSURE: 159 MMHG

## 2023-01-26 LAB
ANION GAP SERPL CALCULATED.3IONS-SCNC: 5.4 MMOL/L (ref 5–15)
BUN SERPL-MCNC: 33 MG/DL (ref 8–23)
BUN/CREAT SERPL: 52.4 (ref 7–25)
CALCIUM SPEC-SCNC: 9.2 MG/DL (ref 8.6–10.5)
CHLORIDE SERPL-SCNC: 101 MMOL/L (ref 98–107)
CO2 SERPL-SCNC: 35.6 MMOL/L (ref 22–29)
CREAT SERPL-MCNC: 0.63 MG/DL (ref 0.57–1)
DEPRECATED RDW RBC AUTO: 46.1 FL (ref 37–54)
EGFRCR SERPLBLD CKD-EPI 2021: 92.6 ML/MIN/1.73
ERYTHROCYTE [DISTWIDTH] IN BLOOD BY AUTOMATED COUNT: 16 % (ref 12.3–15.4)
GLUCOSE SERPL-MCNC: 141 MG/DL (ref 65–99)
HCT VFR BLD AUTO: 33.7 % (ref 34–46.6)
HGB BLD-MCNC: 9.6 G/DL (ref 12–15.9)
MCH RBC QN AUTO: 22.3 PG (ref 26.6–33)
MCHC RBC AUTO-ENTMCNC: 28.5 G/DL (ref 31.5–35.7)
MCV RBC AUTO: 78.2 FL (ref 79–97)
PLATELET # BLD AUTO: 306 10*3/MM3 (ref 140–450)
PMV BLD AUTO: 10.6 FL (ref 6–12)
POTASSIUM SERPL-SCNC: 4.5 MMOL/L (ref 3.5–5.2)
RBC # BLD AUTO: 4.31 10*6/MM3 (ref 3.77–5.28)
SODIUM SERPL-SCNC: 142 MMOL/L (ref 136–145)
WBC NRBC COR # BLD: 12.94 10*3/MM3 (ref 3.4–10.8)

## 2023-01-26 PROCEDURE — 85027 COMPLETE CBC AUTOMATED: CPT | Performed by: INTERNAL MEDICINE

## 2023-01-26 PROCEDURE — 25010000002 METHYLPREDNISOLONE PER 40 MG: Performed by: FAMILY MEDICINE

## 2023-01-26 PROCEDURE — 96376 TX/PRO/DX INJ SAME DRUG ADON: CPT

## 2023-01-26 PROCEDURE — 80048 BASIC METABOLIC PNL TOTAL CA: CPT | Performed by: INTERNAL MEDICINE

## 2023-01-26 PROCEDURE — 94664 DEMO&/EVAL PT USE INHALER: CPT

## 2023-01-26 PROCEDURE — G0378 HOSPITAL OBSERVATION PER HR: HCPCS

## 2023-01-26 PROCEDURE — 94799 UNLISTED PULMONARY SVC/PX: CPT

## 2023-01-26 PROCEDURE — 99239 HOSP IP/OBS DSCHRG MGMT >30: CPT | Performed by: INTERNAL MEDICINE

## 2023-01-26 PROCEDURE — 94761 N-INVAS EAR/PLS OXIMETRY MLT: CPT

## 2023-01-26 RX ORDER — PREDNISONE 20 MG/1
40 TABLET ORAL DAILY
Qty: 6 TABLET | Refills: 0 | Status: SHIPPED | OUTPATIENT
Start: 2023-01-26 | End: 2023-01-29

## 2023-01-26 RX ADMIN — PANTOPRAZOLE SODIUM 40 MG: 40 TABLET, DELAYED RELEASE ORAL at 08:09

## 2023-01-26 RX ADMIN — DULOXETINE HYDROCHLORIDE 60 MG: 30 CAPSULE, DELAYED RELEASE ORAL at 08:09

## 2023-01-26 RX ADMIN — BUDESONIDE 0.5 MG: 0.5 SUSPENSION RESPIRATORY (INHALATION) at 07:01

## 2023-01-26 RX ADMIN — ATORVASTATIN CALCIUM 20 MG: 20 TABLET, FILM COATED ORAL at 08:09

## 2023-01-26 RX ADMIN — SERTRALINE 150 MG: 50 TABLET, FILM COATED ORAL at 08:09

## 2023-01-26 RX ADMIN — IPRATROPIUM BROMIDE AND ALBUTEROL SULFATE 3 ML: 2.5; .5 SOLUTION RESPIRATORY (INHALATION) at 07:00

## 2023-01-26 RX ADMIN — ALPRAZOLAM 0.5 MG: 0.5 TABLET ORAL at 09:55

## 2023-01-26 RX ADMIN — METHYLPREDNISOLONE SODIUM SUCCINATE 40 MG: 40 INJECTION, POWDER, FOR SOLUTION INTRAMUSCULAR; INTRAVENOUS at 08:10

## 2023-01-26 RX ADMIN — METHYLPREDNISOLONE SODIUM SUCCINATE 40 MG: 40 INJECTION, POWDER, FOR SOLUTION INTRAMUSCULAR; INTRAVENOUS at 02:14

## 2023-01-26 RX ADMIN — FLUTICASONE PROPIONATE 2 SPRAY: 50 SPRAY, METERED NASAL at 08:10

## 2023-01-26 RX ADMIN — APIXABAN 5 MG: 5 TABLET, FILM COATED ORAL at 08:09

## 2023-01-26 RX ADMIN — FERROUS SULFATE TAB EC 324 MG (65 MG FE EQUIVALENT) 324 MG: 324 (65 FE) TABLET DELAYED RESPONSE at 08:10

## 2023-01-26 RX ADMIN — Medication 10 ML: at 08:10

## 2023-01-26 NOTE — OUTREACH NOTE
Prep Survey    Flowsheet Row Responses   [facility] facility patient discharged from? [place]   Is LACE score < 7 ? No   Eligibility [facility]   Date of Admission 01/23/23   Date of Discharge 01/26/23   Discharge Disposition Home or Self Care   Discharge diagnosis COPD exacerbation   Does the patient have one of the following disease processes/diagnoses(primary or secondary)? COPD   Does the patient have Home health ordered? No   Is there a DME ordered? Yes   What DME was ordered? oxygen company is [employer].    Prep survey completed? Yes          ANNABELLE UGARTE - Registered Nurse

## 2023-01-27 ENCOUNTER — TRANSITIONAL CARE MANAGEMENT TELEPHONE ENCOUNTER (OUTPATIENT)
Dept: CALL CENTER | Facility: HOSPITAL | Age: 78
End: 2023-01-27
Payer: MEDICARE

## 2023-01-27 NOTE — OUTREACH NOTE
Call Center TCM Note    Flowsheet Row Responses   [facility] patient discharged from? [facility]   Does the patient have one of the following disease processes/diagnoses(primary or secondary)? COPD   TCM attempt successful? Yes   Call start time 1545   Call end time 1553   Discharge diagnosis COPD exacerbation   Person spoke with today (if not patient) and relationship Patient   Meds reviewed with patient/caregiver? Yes   Is the patient having any side effects they believe may be caused by any medication additions or changes? No   Does the patient have all medications ordered at discharge? Yes   Is the patient taking all medications as directed (includes completed medication regime)? Yes   Comments Cardiology 2/13/23 at 11:30 AM,  [facility] clinic 2/7/23   Does the patient have an appointment with their PCP within 7 days of discharge? Yes  [2/1/23 at 3:30 PM]   Pulse Ox monitoring Intermittent   Pulse Ox device source Patient   O2 Sat comments 93-97% 2LO2   O2 Sat: education provided Sat levels, Monitoring frequency, When to seek care   O2 Sat education comments sats remaining below 90% call 911   Psychosocial issues? No   Did the patient receive a copy of their discharge instructions? Yes   Nursing interventions Reviewed instructions with patient   What is the patient's perception of their health status since discharge? Improving   Nursing Interventions Nurse provided patient education   Are the patient's immunizations up to date?  --  [Pt will inquire about at PCP fu appt on 2/1/23]   If the patient is a current smoker, are they able to teach back resources for cessation? Not a smoker   Is the patient/caregiver able to teach back the hierarchy of who to call/visit for symptoms/problems? PCP, Specialist, Home health nurse, Urgent Care, ED, 911 Yes   Is the patient able to teach back COPD zones? Yes   Nursing interventions Education provided on various zones   Patient reports what zone on this call? Green Zone    Green Zone Reports doing well, Breathing without shortness of breath, Slept well last night, Appetite is good, Usual activity and exercise level   Green Zone interventions: Take daily medications   TCM call completed? Yes   Wrap up additional comments Pt states she is doing ok, but report some stomach cramps. Reviewed AVS/medications with pt. Pt verified PCP fu appt on 2/1/23.   Call end time 1553   Would this patient benefit from a Referral to [facility] Social Work? No   Is the patient interested in additional calls from an ambulatory ?  NOTE:  applies to high risk patients requiring additional follow-up. No          Daisy Fam RN    1/27/2023, 15:54 EST

## 2023-02-06 ENCOUNTER — READMISSION MANAGEMENT (OUTPATIENT)
Dept: CALL CENTER | Facility: HOSPITAL | Age: 78
End: 2023-02-06
Payer: MEDICARE

## 2023-02-06 NOTE — OUTREACH NOTE
COPD/PN Week 2 Survey    Flowsheet Row Responses   [facility]   Does the patient have one of the following disease processes/diagnoses(primary or secondary)? COPD   Week 2 attempt successful? No   Unsuccessful attempts Attempt 1          EARLE NICK - Registered Nurse   Statement Selected

## 2023-02-07 ENCOUNTER — DISEASE STATE MANAGEMENT VISIT (OUTPATIENT)
Dept: PHARMACY | Facility: HOSPITAL | Age: 78
End: 2023-02-07
Payer: MEDICARE

## 2023-02-07 ENCOUNTER — TELEPHONE (OUTPATIENT)
Dept: CARDIOLOGY | Facility: CLINIC | Age: 78
End: 2023-02-07
Payer: MEDICARE

## 2023-02-07 VITALS
WEIGHT: 160 LBS | DIASTOLIC BLOOD PRESSURE: 82 MMHG | OXYGEN SATURATION: 98 % | SYSTOLIC BLOOD PRESSURE: 136 MMHG | HEIGHT: 66 IN | HEART RATE: 102 BPM | BODY MASS INDEX: 25.71 KG/M2

## 2023-02-07 DIAGNOSIS — J96.12 CHRONIC RESPIRATORY FAILURE WITH HYPOXIA AND HYPERCAPNIA: ICD-10-CM

## 2023-02-07 DIAGNOSIS — Z87.891 PERSONAL HISTORY OF NICOTINE DEPENDENCE: ICD-10-CM

## 2023-02-07 DIAGNOSIS — Z12.2 ENCOUNTER FOR SCREENING FOR LUNG CANCER: ICD-10-CM

## 2023-02-07 DIAGNOSIS — J44.9 CHRONIC OBSTRUCTIVE PULMONARY DISEASE, UNSPECIFIED COPD TYPE: Primary | ICD-10-CM

## 2023-02-07 DIAGNOSIS — J96.11 CHRONIC RESPIRATORY FAILURE WITH HYPOXIA AND HYPERCAPNIA: ICD-10-CM

## 2023-02-07 PROCEDURE — 99214 OFFICE O/P EST MOD 30 MIN: CPT | Performed by: NURSE PRACTITIONER

## 2023-02-07 RX ORDER — BUDESONIDE, GLYCOPYRROLATE, AND FORMOTEROL FUMARATE 160; 9; 4.8 UG/1; UG/1; UG/1
2 AEROSOL, METERED RESPIRATORY (INHALATION) 2 TIMES DAILY
Qty: 3 EACH | Refills: 3
Start: 2023-02-07

## 2023-02-07 RX ORDER — ARFORMOTEROL TARTRATE 15 UG/2ML
15 SOLUTION RESPIRATORY (INHALATION)
Qty: 120 ML | Refills: 5 | Status: CANCELLED | OUTPATIENT
Start: 2023-02-07

## 2023-02-07 RX ORDER — REVEFENACIN 175 UG/3ML
175 SOLUTION RESPIRATORY (INHALATION)
Qty: 90 ML | Refills: 5 | Status: CANCELLED | OUTPATIENT
Start: 2023-02-07

## 2023-02-07 RX ORDER — BUDESONIDE 0.5 MG/2ML
0.5 INHALANT ORAL
Qty: 1 EACH | Refills: 5 | Status: CANCELLED | OUTPATIENT
Start: 2023-02-07

## 2023-02-07 NOTE — PROGRESS NOTES
"     Follow Up Office Visit      Patient Name: Gabi Dudley    Chief Complaint:  Hospital follow up      History of Present Illness: Gabi Dudley is a 77 y.o. female who is here today for follow up of recent hospitalization at [facility], [place] for management of acute respiratory failure secondary to acute COPD exacerbation, at which time she admitted that she does not use her trilogy ventilator consistently. She was d/c home to complete a course of prednisone.    Since the time of hospital discharge, patient notes that she is doing \"pretty good\" as her dyspnea has returned to her baseline. She struggles with anxiety which also worsens her breathing. She also has insomnia and has a hard time tolerating her trilogy ventilator. She says that the NIV is too much trouble when she has to get up to use the bathroom in the middle of the night.  She has had difficulty affording inhalers and is using either Trelegy or Breztri sample, what ever she is able to obtain from the clinic when needed.  Patient assistance for Trelegy was submitted, though did not qualify due to need for out-of-pocket expense.  Maintains smoking cessation.    Severity: Severe COPD  Timing: Daily  Associated Symptoms: Exertional dyspnea, +cough, no current wheezing, no fevers, no hemoptysis  Modifying Factors: Worse with exertion and weather changes and anxiety.  Supplemental Oxygen: Yes  Last Hospitalization: January 2023    Subjective      Review of Systems:  Review of Systems   Constitutional: Positive for fatigue. Negative for fever and unexpected weight change.   Respiratory: Positive for cough and shortness of breath. Negative for wheezing.    Cardiovascular: Negative for chest pain and leg swelling.   Allergic/Immunologic: Positive for environmental allergies.   Psychiatric/Behavioral: Positive for sleep disturbance. The patient is nervous/anxious.      Past Medical History:   Past Medical History:   Diagnosis Date   • Adrenal adenoma    • Anemia    • " Arrhythmia    • Asthma    • Atrial fibrillation (HCC)    • Back pain    • Benign colonic polyp    • Benign tumor of adrenal gland    • Cataract     bilateral   • Cholelithiasis    • Chronic bronchitis (HCC)    • Chronic bronchitis with COPD (chronic obstructive pulmonary disease) (HCC)    • COPD (chronic obstructive pulmonary disease) (HCC) 2005   • Coronary artery disease    • Depression    • Elevated cholesterol    • Environmental and seasonal allergies    • Fibromyalgia    • Gastritis    • Generalized anxiety disorder    • GERD (gastroesophageal reflux disease)    • H/O mammogram    • Hemorrhoids    • History of blood transfusion 1985   • History of blood transfusion 1985   • History of echocardiogram    • History of endometriosis    • History of nuclear stress test    • Hypertension    • IBS (irritable bowel syndrome)    • Impaired functional mobility, balance, gait, and endurance    • Impaired mobility    • Inverted nipple    • Kidney disease    • Kidney stone    • Liver cyst    • Nodular radiologic density    • Nodule of left lung    • On home oxygen therapy     2 liters NC QHS   • Osteoarthritis    • Osteoporosis    • PONV (postoperative nausea and vomiting)    • Renal cyst    • Sinus problem     2014   • Sinusitis    • Skin cancer     basal cell carcinoma   • SOB (shortness of breath)    • Tobacco use    • Urinary frequency    • Vitamin D deficiency    • Wears glasses    • Wears partial dentures     upper plate       Past Surgical History:   Past Surgical History:   Procedure Laterality Date   • APPENDECTOMY  1980s   • CARDIAC CATHETERIZATION  2003   • CATARACT EXTRACTION  2013    both eyes   • CHOLECYSTECTOMY WITH INTRAOPERATIVE CHOLANGIOGRAM N/A 10/30/2020    Procedure: CHOLECYSTECTOMY LAPAROSCOPIC INTRAOPERATIVE CHOLANGIOGRAPHY;  Surgeon: Sima Moses MD;  Location: [facility];  Service: General;  Laterality: N/A;   • COLONOSCOPY  03/11/2013   • COLONOSCOPY  06/21/2016   • COLONOSCOPY N/A 7/24/2019     Procedure: COLONOSCOPY W/ COLD FORCEP POLYPECTOMIES; HOT SNARE POLYPECTOMIES; COLD SNARE POLYPECTOMY;  Surgeon: Goyo Nunez MD;  Location: [facility] ENDOSCOPY;  Service: Gastroenterology   • COLONOSCOPY W/ BIOPSIES AND POLYPECTOMY     • EXPLORATORY LAPAROTOMY N/A 2020    Procedure: colectomy, right, closure of enterotomy x 2, reduction of internal volvulus;  Surgeon: Sima Moses MD;  Location: [facility] OR;  Service: General;  Laterality: N/A;   • HYSTERECTOMY      partial   • LYSIS OF ABDOMINAL ADHESIONS     • TONSILLECTOMY     • UPPER GASTROINTESTINAL ENDOSCOPY  2015   • VAGINAL DELIVERY      x2       Family History:   Family History   Problem Relation Age of Onset   • Stomach cancer Brother    • Colon cancer Maternal Aunt    • Brain cancer Father    • Arthritis Father    • Hypertension Father    • Lung cancer Paternal Grandfather    • Breast cancer Neg Hx    • Ovarian cancer Neg Hx        Social History:   Social History     Socioeconomic History   • Marital status:    Tobacco Use   • Smoking status: Former     Packs/day: 0.25     Years: 30.00     Pack years: 7.50     Types: Cigarettes     Quit date: 2020     Years since quittin.2     Passive exposure: Past   • Smokeless tobacco: Never   Vaping Use   • Vaping Use: Never used   Substance and Sexual Activity   • Alcohol use: No   • Drug use: No   • Sexual activity: Defer       Current Medications:     Current Outpatient Medications:   •  albuterol sulfate  (90 Base) MCG/ACT inhaler, INHALE 2 PUFFS BY MOUTH EVERY 4 HOURS AS NEEDED FOR WHEEZING, Disp: 18 g, Rfl: 5  •  apixaban (Eliquis) 5 MG tablet tablet, Take 1 tablet by mouth Every 12 (Twelve) Hours., Disp: 60 tablet, Rfl: 11  •  atorvastatin (LIPITOR) 20 MG tablet, TAKE 1 TABLET BY MOUTH EVERY DAY, Disp: 90 tablet, Rfl: 3  •  benzonatate (Tessalon Perles) 100 MG capsule, Take 1 capsule by mouth 3 (Three) Times a Day As Needed for Cough., Disp: 60 capsule, Rfl: 1  •   cetirizine (zyrTEC) 10 MG tablet, Take 1 tablet by mouth Daily., Disp: 30 tablet, Rfl: 2  •  Cholecalciferol (vitamin D3) 125 MCG (5000 UT) capsule capsule, Take 5,000 Units by mouth Daily., Disp: , Rfl:   •  clonazePAM (KlonoPIN) 1 MG tablet, Take 1 tablet by mouth Daily As Needed for Anxiety., Disp: 30 tablet, Rfl: 0  •  clotrimazole-betamethasone (Lotrisone) 1-0.05 % cream, Apply  topically to the appropriate area as directed 2 (Two) Times a Day., Disp: 45 g, Rfl: 1  •  DULoxetine (CYMBALTA) 60 MG capsule, Take 1 capsule by mouth 2 (Two) Times a Day. (Patient taking differently: Take 60 mg by mouth Daily.), Disp: 180 capsule, Rfl: 3  •  fluticasone (FLONASE) 50 MCG/ACT nasal spray, 2 sprays into the nostril(s) as directed by provider Daily., Disp: , Rfl:   •  ipratropium-albuterol (DUO-NEB) 0.5-2.5 mg/3 ml nebulizer, USE 3 mL VIA NEBULIZER EVERY 4 HOURS AS NEEDED FOR WHEEZING, Disp: 360 mL, Rfl: 11  •  O2 (OXYGEN), Inhale 2 L/min As Needed., Disp: , Rfl:   •  ondansetron (ZOFRAN) 4 MG tablet, Take 1 tablet by mouth Every 8 (Eight) Hours As Needed for Nausea or Vomiting., Disp: 90 tablet, Rfl: 0  •  oxyCODONE-acetaminophen (PERCOCET) 7.5-325 MG per tablet, Take 1 tablet by mouth Every 4 (Four) Hours As Needed., Disp: , Rfl:   •  pantoprazole (PROTONIX) 40 MG EC tablet, TAKE 1 TABLET BY MOUTH DAILY, Disp: 90 tablet, Rfl: 3  •  sertraline (ZOLOFT) 100 MG tablet, TAKE 1 & 1/2 TABLET BY MOUTH DAILY, Disp: 135 tablet, Rfl: 3  •  traZODone (DESYREL) 100 MG tablet, 1 qhs po prn, Disp: 30 tablet, Rfl: 2  •  Budeson-Glycopyrrol-Formoterol (Breztri Aerosphere) 160-9-4.8 MCG/ACT aerosol inhaler, Inhale 2 puffs 2 (Two) Times a Day. Rinse mouth out after use, Disp: 3 each, Rfl: 3     Allergies:   Allergies   Allergen Reactions   • Doxycycline GI Intolerance       Objective     Physical Exam:  Vital Signs:   Vitals:    02/07/23 1457   BP: 136/82   BP Location: Left arm   Patient Position: Sitting   Cuff Size: Adult   Pulse: 102  "  SpO2: 98%   Weight: 72.6 kg (160 lb)   Height: 167.6 cm (66\")     Body mass index is 25.82 kg/m².    Physical Exam  Vitals reviewed.   Constitutional:       General: She is not in acute distress.     Appearance: She is not toxic-appearing.      Comments: + Wearing supplemental oxygen   HENT:      Head: Normocephalic and atraumatic.   Eyes:      Extraocular Movements: Extraocular movements intact.      Conjunctiva/sclera: Conjunctivae normal.      Pupils: Pupils are equal, round, and reactive to light.   Cardiovascular:      Rate and Rhythm: Normal rate.      Heart sounds: Normal heart sounds.   Pulmonary:      Effort: Pulmonary effort is normal.      Breath sounds: Normal breath sounds.   Abdominal:      General: There is no distension.   Musculoskeletal:         General: No swelling.      Cervical back: Neck supple.      Comments: Deconditioned, in a wheelchair   Skin:     General: Skin is warm and dry.      Findings: No rash.   Neurological:      General: No focal deficit present.      Mental Status: She is alert and oriented to person, place, and time.   Psychiatric:         Mood and Affect: Mood normal.         Behavior: Behavior normal.       Results Review:   Site   Date Value Ref Range Status   01/23/2023 Right Radial  Final     Glenn's Test   Date Value Ref Range Status   01/23/2023 Positive  Final     pH, Arterial   Date Value Ref Range Status   01/23/2023 7.334 (L) 7.350 - 7.450 pH units Final     Comment:     84 Value below reference range     pCO2, Arterial   Date Value Ref Range Status   01/23/2023 67.6 (C) 35.0 - 45.0 mm Hg Final     Comment:     86 Value above critical limit     pO2, Arterial   Date Value Ref Range Status   01/23/2023 156.0 (H) 75.0 - 100.0 mm Hg Final     Comment:     83 Value above reference range     HCO3, Arterial   Date Value Ref Range Status   01/23/2023 36.0 (H) 22.0 - 28.0 mmol/L Final     Comment:     83 Value above reference range     Base Excess, Arterial   Date Value Ref " Range Status   01/23/2023 8.7 (H) 0.0 - 2.0 mmol/L Final     Comment:     83 Value above reference range     O2 Saturation, Arterial   Date Value Ref Range Status   01/23/2023 99.6 94.0 - 100.0 % Final     Comment:     83 Value above reference range     Hematocrit, Blood Gas   Date Value Ref Range Status   01/23/2023 26.1 % Final     Comment:     84 Value below reference range     Oxyhemoglobin   Date Value Ref Range Status   01/23/2023 97.8 94 - 99 % Final     Methemoglobin   Date Value Ref Range Status   01/23/2023 0.90 0.00 - 1.50 % Final     Carboxyhemoglobin   Date Value Ref Range Status   01/23/2023 0.9 0 - 2 % Final     Barometric Pressure for Blood Gas   Date Value Ref Range Status   01/23/2023 730 mmHg Final     Modality   Date Value Ref Range Status   01/23/2023 Nasal Cannula  Final     FIO2   Date Value Ref Range Status   11/21/2022 44 % Final     May 2022 PFTs showed severe obstruction, with significant bronchodilator response. Post BD FEV1: 31%, FEV1/FVC ratio was 50. No restriction, but air-trapping suggested.    WBC   Date Value Ref Range Status   01/26/2023 12.94 (H) 3.40 - 10.80 10*3/mm3 Final   05/06/2022 6.42 3.40 - 10.80 10*3/mm3 Final     RBC   Date Value Ref Range Status   01/26/2023 4.31 3.77 - 5.28 10*6/mm3 Final   05/06/2022 4.71 3.77 - 5.28 10*6/mm3 Final     Hemoglobin   Date Value Ref Range Status   01/26/2023 9.6 (L) 12.0 - 15.9 g/dL Final     Hematocrit   Date Value Ref Range Status   01/26/2023 33.7 (L) 34.0 - 46.6 % Final     MCV   Date Value Ref Range Status   01/26/2023 78.2 (L) 79.0 - 97.0 fL Final     MCH   Date Value Ref Range Status   01/26/2023 22.3 (L) 26.6 - 33.0 pg Final     MCHC   Date Value Ref Range Status   01/26/2023 28.5 (L) 31.5 - 35.7 g/dL Final     RDW   Date Value Ref Range Status   01/26/2023 16.0 (H) 12.3 - 15.4 % Final     RDW-SD   Date Value Ref Range Status   01/26/2023 46.1 37.0 - 54.0 fl Final     MPV   Date Value Ref Range Status   01/26/2023 10.6 6.0 -  12.0 fL Final     Platelets   Date Value Ref Range Status   01/26/2023 306 140 - 450 10*3/mm3 Final     Neutrophil %   Date Value Ref Range Status   01/24/2023 91.5 (H) 42.7 - 76.0 % Final     Lymphocyte %   Date Value Ref Range Status   01/24/2023 5.6 (L) 19.6 - 45.3 % Final     Monocyte %   Date Value Ref Range Status   01/24/2023 2.4 (L) 5.0 - 12.0 % Final     Eosinophil %   Date Value Ref Range Status   01/24/2023 0.0 (L) 0.3 - 6.2 % Final     Basophil %   Date Value Ref Range Status   01/24/2023 0.0 0.0 - 1.5 % Final     Immature Grans %   Date Value Ref Range Status   01/24/2023 0.5 0.0 - 0.5 % Final     Neutrophils, Absolute   Date Value Ref Range Status   01/24/2023 7.88 (H) 1.70 - 7.00 10*3/mm3 Final     Lymphocytes, Absolute   Date Value Ref Range Status   01/24/2023 0.48 (L) 0.70 - 3.10 10*3/mm3 Final     Monocytes, Absolute   Date Value Ref Range Status   01/24/2023 0.21 0.10 - 0.90 10*3/mm3 Final     Eosinophils, Absolute   Date Value Ref Range Status   01/24/2023 0.00 0.00 - 0.40 10*3/mm3 Final     Basophils, Absolute   Date Value Ref Range Status   01/24/2023 0.00 0.00 - 0.20 10*3/mm3 Final     Immature Grans, Absolute   Date Value Ref Range Status   01/24/2023 0.04 0.00 - 0.05 10*3/mm3 Final     nRBC   Date Value Ref Range Status   01/24/2023 0.0 0.0 - 0.2 /100 WBC Final     Lab Results   Component Value Date    GLUCOSE 141 (H) 01/26/2023    BUN 33 (H) 01/26/2023    CREATININE 0.63 01/26/2023    EGFRRESULT 91.6 05/06/2022    EGFR 92.6 01/26/2023    BCR 52.4 (H) 01/26/2023    K 4.5 01/26/2023    CO2 35.6 (H) 01/26/2023    CALCIUM 9.2 01/26/2023    PROTENTOTREF 7.1 05/06/2022    ALBUMIN 4.0 01/23/2023    LABIL2 1.5 05/06/2022    AST 19 01/23/2023    ALT 11 01/23/2023 January 2023 chest x-ray was unremarkable.  Underlying emphysema.    Assessment / Plan      Assessment/Plan:   Diagnoses and all orders for this visit:    1. Chronic obstructive pulmonary disease, unspecified COPD type (HCC)  (Primary)  -     Budeson-Glycopyrrol-Formoterol (Breztri Aerosphere) 160-9-4.8 MCG/ACT aerosol inhaler; Inhale 2 puffs 2 (Two) Times a Day. Rinse mouth out after use  Dispense: 3 each; Refill: 3  Will submit patient assistance paperwork for Breztri as it appears that patient will qualify for this and will no longer have to rely on samples. We discussed the risk and benefits of inhaled corticosteroids. Patient instructed to take them on a regular basis as prescribed. Patient instructed to rinse their mouth out after each use.    2. Chronic respiratory failure with hypoxia and hypercapnia (HCC)  -     Miscellaneous DME  Review of ABGs reveals chronic hypercapnic respiratory failure and has had a recent hosptialization. Difficulty tolerating use of Trilogy ventilator at night. Ordering [id number] ventilator to provide proper respiratory support due to patient's severe COPD and chronic respiratory failure as this device will likely be more tolerable for her for longer periods of time throughout the day and night and will also provide her with easier mobility. Absence of respiratory support can lead to declining health status or untimely readmission to the hospital.     3. Personal history of nicotine dependence  -      CT Chest Low Dose Cancer Screening WO; Future  Ongoing cessation advised.    4. Encounter for screening for lung cancer  -      CT Chest Low Dose Cancer Screening WO; Future  Patient with greater than 30-pack-year smoking history, and therefore low-dose lung cancer screening is recommended.  Shared decision making counseling including risks and benefits of low-dose lung cancer screening, follow-up diagnostic testing that may be indicated and how comorbid conditions would affect diagnosis/treatment, false positive rates, and total radiation exposure.  Patient wishes to undergo screening, seen as they would be willing to seek diagnosis and treatment if necessitated by results.    Follow Up:   Return in about  3 months (around 5/7/2023) for Recheck.  The patient was counseled on diagnostic results, risks and benefits of treatment options, risk factor modifications and the importance of treatment compliance. The patient was advised to contact the clinic with concerns or worsening symptoms.     Doris Juárez, REGINA   Pulmonary Medicine [place]

## 2023-02-07 NOTE — TELEPHONE ENCOUNTER
Patient is needing to have teeth extracted and is needing instructions for holding her Eliquis. Please advise.

## 2023-02-09 ENCOUNTER — OFFICE VISIT (OUTPATIENT)
Dept: INTERNAL MEDICINE | Facility: CLINIC | Age: 78
End: 2023-02-09
Payer: MEDICARE

## 2023-02-09 VITALS
HEART RATE: 105 BPM | TEMPERATURE: 97.2 F | SYSTOLIC BLOOD PRESSURE: 146 MMHG | OXYGEN SATURATION: 99 % | WEIGHT: 159 LBS | BODY MASS INDEX: 25.55 KG/M2 | HEIGHT: 66 IN | DIASTOLIC BLOOD PRESSURE: 82 MMHG

## 2023-02-09 DIAGNOSIS — J96.21 ACUTE ON CHRONIC RESPIRATORY FAILURE WITH HYPOXIA AND HYPERCAPNIA: ICD-10-CM

## 2023-02-09 DIAGNOSIS — J42 CHRONIC BRONCHITIS, UNSPECIFIED CHRONIC BRONCHITIS TYPE: ICD-10-CM

## 2023-02-09 DIAGNOSIS — I10 ESSENTIAL HYPERTENSION: ICD-10-CM

## 2023-02-09 DIAGNOSIS — D50.0 IRON DEFICIENCY ANEMIA DUE TO CHRONIC BLOOD LOSS: Primary | ICD-10-CM

## 2023-02-09 DIAGNOSIS — I48.0 PAROXYSMAL ATRIAL FIBRILLATION: ICD-10-CM

## 2023-02-09 DIAGNOSIS — K21.9 GASTROESOPHAGEAL REFLUX DISEASE WITHOUT ESOPHAGITIS: ICD-10-CM

## 2023-02-09 DIAGNOSIS — F51.04 PSYCHOPHYSIOLOGICAL INSOMNIA: ICD-10-CM

## 2023-02-09 DIAGNOSIS — M17.0 PRIMARY OSTEOARTHRITIS OF BOTH KNEES: ICD-10-CM

## 2023-02-09 DIAGNOSIS — E78.2 MIXED HYPERLIPIDEMIA: ICD-10-CM

## 2023-02-09 DIAGNOSIS — R11.0 NAUSEA: ICD-10-CM

## 2023-02-09 DIAGNOSIS — J96.22 ACUTE ON CHRONIC RESPIRATORY FAILURE WITH HYPOXIA AND HYPERCAPNIA: ICD-10-CM

## 2023-02-09 DIAGNOSIS — F41.9 ANXIETY: ICD-10-CM

## 2023-02-09 PROCEDURE — 99495 TRANSJ CARE MGMT MOD F2F 14D: CPT | Performed by: INTERNAL MEDICINE

## 2023-02-09 PROCEDURE — 1111F DSCHRG MED/CURRENT MED MERGE: CPT | Performed by: INTERNAL MEDICINE

## 2023-02-09 RX ORDER — BENZONATATE 100 MG/1
100 CAPSULE ORAL 3 TIMES DAILY PRN
Qty: 60 CAPSULE | Refills: 1 | Status: CANCELLED | OUTPATIENT
Start: 2023-02-09

## 2023-02-09 RX ORDER — ATORVASTATIN CALCIUM 20 MG/1
20 TABLET, FILM COATED ORAL DAILY
Qty: 90 TABLET | Refills: 3 | Status: CANCELLED | OUTPATIENT
Start: 2023-02-09

## 2023-02-09 RX ORDER — PANTOPRAZOLE SODIUM 40 MG/1
40 TABLET, DELAYED RELEASE ORAL DAILY
Qty: 90 TABLET | Refills: 3 | Status: CANCELLED | OUTPATIENT
Start: 2023-02-09

## 2023-02-09 RX ORDER — CLONAZEPAM 1 MG/1
1 TABLET ORAL DAILY PRN
Qty: 30 TABLET | Refills: 0 | Status: CANCELLED | OUTPATIENT
Start: 2023-02-09

## 2023-02-09 RX ORDER — ONDANSETRON 4 MG/1
4 TABLET, FILM COATED ORAL EVERY 8 HOURS PRN
Qty: 90 TABLET | Refills: 0 | Status: CANCELLED | OUTPATIENT
Start: 2023-02-09

## 2023-02-09 RX ORDER — SERTRALINE HYDROCHLORIDE 100 MG/1
TABLET, FILM COATED ORAL
Qty: 135 TABLET | Refills: 3 | Status: CANCELLED | OUTPATIENT
Start: 2023-02-09

## 2023-02-09 RX ORDER — TRAZODONE HYDROCHLORIDE 100 MG/1
TABLET ORAL
Qty: 90 TABLET | Refills: 3 | Status: CANCELLED | OUTPATIENT
Start: 2023-02-09

## 2023-02-09 NOTE — PROGRESS NOTES
Transitional Care Follow Up Visit  Subjective     Gabi Dudley is a 77 y.o. female who presents for a transitional care management visit.    Within 48 business hours after discharge our office contacted her via telephone to coordinate her care and needs.      I reviewed and discussed the details of that call along with the discharge summary, hospital problems, inpatient lab results, inpatient diagnostic studies, and consultation reports with Gabi.     Current outpatient and discharge medications have been reconciled for the patient.  Reviewed by: Paul Quinteros MD      Date of TCM Phone Call 1/26/2023   [facility]   Date of Admission 1/23/2023   Date of Discharge 1/26/2023   Discharge Disposition Home or Self Care     Risk for Readmission (LACE) Score: 7 (1/26/2023  6:00 AM)      History of Present Illness   Course During Hospital Stay: Patient here for transitional care.  Recent acute respiratory failure discharged from hospital patient denies any short of breath now white blood cell elevated needs to be followed up.Anemia needs to be worked up hemoglobin still low.  Patient also complains leg weakness knee joint pain.  Still anxious patient needs medicine refill.  Occasional nausea patient needs medicine refill.  Atrial fibrillation stable on medication.  Hypertension stable on medication.  GERD stable on medication.     The following portions of the patient's history were reviewed and updated as appropriate: allergies, current medications, past family history, past medical history, past social history, past surgical history and problem list.    Review of Systems   Respiratory: Negative.    Cardiovascular: Negative.    Gastrointestinal: Positive for nausea.   Musculoskeletal: Positive for arthralgias.   Skin: Negative.    Neurological: Positive for weakness.   Psychiatric/Behavioral: Positive for sleep disturbance.       Objective   Physical Exam  Cardiovascular:      Rate and Rhythm: Normal rate and  regular rhythm.      Heart sounds: Normal heart sounds.   Pulmonary:      Effort: Pulmonary effort is normal.      Breath sounds: Normal breath sounds.   Abdominal:      General: Bowel sounds are normal.   Musculoskeletal:      Cervical back: Neck supple.   Skin:     General: Skin is warm.   Neurological:      Mental Status: She is alert and oriented to person, place, and time.         Assessment & Plan   Diagnoses and all orders for this visit:    1. Iron deficiency anemia due to chronic blood loss (Primary) refer to GI    2. Chronic bronchitis, unspecified chronic bronchitis type (HCC) continue present management    3. Anxiety refer to psych refill clonazepam decrease Cymbalta to 60 mg daily patient states twice a day is no help.    4. Nausea stable on Zoloft continue medication refill today    5. Psychophysiological insomnia stable on medication    6. Acute on chronic respiratory failure with hypoxia and hypercapnia (HCC)    7. Paroxysmal atrial fibrillation (HCC)    8. Mixed hyperlipidemia stable on medication    9. Essential hypertension stable on medication continue medication    10. Gastroesophageal reflux disease without esophagitis stable on medication    11. Primary osteoarthritis of both knees Tylenol arthritis and lower extremity weakness refer to physical therapy      1 month follow-up          COPD exacerbation (HCC)  --Detailed Medical Reasoning: As evidenced by increased work of breathing sputum production cough  --Plan: as above       Iron deficiency anemia  --Detailed Medical Reasoning: As evidenced by iron studies  --Plan: We will start iron supplement recommend colonoscopy outpatient       Impaired mobility and ADLs  --Detailed Medical Reasoning: As evidenced by decline in ADL patient reported  --Plan: PT OT following patient declines short-term rehab, declines

## 2023-02-10 ENCOUNTER — READMISSION MANAGEMENT (OUTPATIENT)
Dept: CALL CENTER | Facility: HOSPITAL | Age: 78
End: 2023-02-10
Payer: MEDICARE

## 2023-02-10 NOTE — OUTREACH NOTE
COPD/PN Week 2 Survey    Flowsheet Row Responses   [facility]   Does the patient have one of the following disease processes/diagnoses(primary or secondary)? COPD   Week 2 attempt successful? No   Unsuccessful attempts Attempt 2          ANNABELLE JAVIER - Registered Nurse

## 2023-02-13 ENCOUNTER — OFFICE VISIT (OUTPATIENT)
Dept: CARDIOLOGY | Facility: CLINIC | Age: 78
End: 2023-02-13
Payer: MEDICARE

## 2023-02-13 VITALS
SYSTOLIC BLOOD PRESSURE: 112 MMHG | RESPIRATION RATE: 18 BRPM | HEART RATE: 99 BPM | WEIGHT: 161 LBS | DIASTOLIC BLOOD PRESSURE: 60 MMHG | OXYGEN SATURATION: 100 % | BODY MASS INDEX: 25.88 KG/M2 | HEIGHT: 66 IN

## 2023-02-13 DIAGNOSIS — I10 ESSENTIAL HYPERTENSION: ICD-10-CM

## 2023-02-13 DIAGNOSIS — I25.10 CORONARY ARTERY DISEASE INVOLVING NATIVE CORONARY ARTERY OF NATIVE HEART WITHOUT ANGINA PECTORIS: ICD-10-CM

## 2023-02-13 DIAGNOSIS — J43.1 PANLOBULAR EMPHYSEMA: ICD-10-CM

## 2023-02-13 DIAGNOSIS — R07.9 CHEST PAIN, UNSPECIFIED TYPE: Primary | ICD-10-CM

## 2023-02-13 DIAGNOSIS — I48.0 PAROXYSMAL ATRIAL FIBRILLATION: ICD-10-CM

## 2023-02-13 PROBLEM — J44.1 COPD WITH ACUTE EXACERBATION: Status: RESOLVED | Noted: 2023-01-23 | Resolved: 2023-02-13

## 2023-02-13 PROCEDURE — 93000 ELECTROCARDIOGRAM COMPLETE: CPT | Performed by: INTERNAL MEDICINE

## 2023-02-13 PROCEDURE — 99214 OFFICE O/P EST MOD 30 MIN: CPT | Performed by: INTERNAL MEDICINE

## 2023-02-13 NOTE — PROGRESS NOTES
"             [facility] Cardiology Office Follow Up Note    Gabi Dudley  [id number]  2023    Primary Care Provider: Paul Quinteros MD    Chief Complaint: Routine follow-up    History of Present Illness:   Mrs. Gabi Dudley is a 77 y.o. female who  presents to Cardiology clinic today for routine follow-up. The patient has a past medical history significant for hypertension, dyslipidemia, prior pulmonary embolism, fibromyalgia, anxiety, and chronic tobacco use complicated by COPD.  Her past cardiac history is significant for atrial fibrillation and nonobstructive coronary artery disease diagnosed on remote coronary angiogram in approximately .  She presents today for routine follow-up.  Since her last appointment, the patient was hospitalized in  for COPD exacerbation.  Since discharge, the patient does report steady improvement in her dyspnea.  She remains on continuous supplemental O2 via nasal cannula.  She does report occasional episodes of a \"heaviness\" located in her central chest.  She denies any association with exertion.  No associated nausea, vomiting, or diaphoresis.  In terms of her atrial fibrillation, she has not had any significant episodes of palpitations.  She continues to tolerate Eliquis without significant bleeding or bruising.  No other specific complaints today.    Past Cardiac Testin. Last Coronary Angio:  , reportedly nonobstructive disease.  Report unavailable  2. Prior Stress Testing: Remote, with report unavailable  3. Last Echo:     1.  Normal left ventricular size and systolic function, LVEF 60-65%.    2.  Mild concentric LVH.    3.  Normal LV diastolic filling pattern.    4.  Normal right ventricular size and systolic function.    5.  Mildly increased left atrial volume index.    6.  No significant valvular abnormalities.  4. Prior Holter Monitor: 48-hour Holter 2020              1.  The predominant rhythm is sinus rhythm with an " "average heart rate 90 bpm.              2.  Normal atrioventricular conduction.              3.  No sustained supraventricular or ventricular arrhythmias.              4.  No episodes of paroxysmal atrial fibrillation.              5.  Rare supraventricular ectopy with isolated PACs, burden <0.1%.              6.  Rare ventricular ectopy with isolated and pairs of PVCs, burden <0.1%.              7.  One symptomatic episode of \"anxiety, chest pain, shortness of air\" corresponded to NSR at 91 bpm.    Review of Systems:   Review of Systems   Constitutional: Negative for activity change, appetite change, chills, diaphoresis, fatigue, fever, unexpected weight gain and unexpected weight loss.   Eyes: Negative for blurred vision.   Respiratory: Positive for chest tightness and shortness of breath. Negative for cough and wheezing.    Cardiovascular: Negative for chest pain, palpitations and leg swelling.   Gastrointestinal: Negative for abdominal pain, anal bleeding, blood in stool and GERD.   Endocrine: Negative for cold intolerance and heat intolerance.   Genitourinary: Negative for hematuria.   Neurological: Negative for dizziness, syncope, weakness and light-headedness.   Hematological: Does not bruise/bleed easily.   Psychiatric/Behavioral: Negative for depressed mood and stress. The patient is nervous/anxious.        I have reviewed and/or updated the patient's past medical, past surgical, family, social history, problem list and allergies as appropriate.     Medications:     Current Outpatient Medications:   •  albuterol sulfate  (90 Base) MCG/ACT inhaler, INHALE 2 PUFFS BY MOUTH EVERY 4 HOURS AS NEEDED FOR WHEEZING, Disp: 18 g, Rfl: 5  •  apixaban (Eliquis) 5 MG tablet tablet, Take 1 tablet by mouth Every 12 (Twelve) Hours., Disp: 60 tablet, Rfl: 11  •  atorvastatin (LIPITOR) 20 MG tablet, TAKE 1 TABLET BY MOUTH EVERY DAY, Disp: 90 tablet, Rfl: 3  •  benzonatate (Tessalon Perles) 100 MG capsule, Take 1 " capsule by mouth 3 (Three) Times a Day As Needed for Cough., Disp: 60 capsule, Rfl: 1  •  Budeson-Glycopyrrol-Formoterol (Breztri Aerosphere) 160-9-4.8 MCG/ACT aerosol inhaler, Inhale 2 puffs 2 (Two) Times a Day. Rinse mouth out after use, Disp: 3 each, Rfl: 3  •  cetirizine (zyrTEC) 10 MG tablet, Take 1 tablet by mouth Daily., Disp: 30 tablet, Rfl: 2  •  Cholecalciferol (vitamin D3) 125 MCG (5000 UT) capsule capsule, Take 5,000 Units by mouth Daily., Disp: , Rfl:   •  clonazePAM (KlonoPIN) 1 MG tablet, Take 1 tablet by mouth Daily As Needed for Anxiety., Disp: 30 tablet, Rfl: 0  •  clotrimazole-betamethasone (Lotrisone) 1-0.05 % cream, Apply  topically to the appropriate area as directed 2 (Two) Times a Day., Disp: 45 g, Rfl: 1  •  DULoxetine (CYMBALTA) 60 MG capsule, Take 1 capsule by mouth 2 (Two) Times a Day. (Patient taking differently: Take 60 mg by mouth Daily.), Disp: 180 capsule, Rfl: 3  •  fluticasone (FLONASE) 50 MCG/ACT nasal spray, 2 sprays into the nostril(s) as directed by provider Daily., Disp: , Rfl:   •  ipratropium-albuterol (DUO-NEB) 0.5-2.5 mg/3 ml nebulizer, USE 3 mL VIA NEBULIZER EVERY 4 HOURS AS NEEDED FOR WHEEZING, Disp: 360 mL, Rfl: 11  •  O2 (OXYGEN), Inhale 2 L/min As Needed., Disp: , Rfl:   •  ondansetron (ZOFRAN) 4 MG tablet, Take 1 tablet by mouth Every 8 (Eight) Hours As Needed for Nausea or Vomiting., Disp: 90 tablet, Rfl: 0  •  oxyCODONE-acetaminophen (PERCOCET) 7.5-325 MG per tablet, Take 1 tablet by mouth Every 4 (Four) Hours As Needed., Disp: , Rfl:   •  pantoprazole (PROTONIX) 40 MG EC tablet, TAKE 1 TABLET BY MOUTH DAILY, Disp: 90 tablet, Rfl: 3  •  sertraline (ZOLOFT) 100 MG tablet, TAKE 1 & 1/2 TABLET BY MOUTH DAILY, Disp: 135 tablet, Rfl: 3  •  traZODone (DESYREL) 100 MG tablet, 1 qhs po prn, Disp: 30 tablet, Rfl: 2    Physical Exam:  Vital Signs:   Vitals:    02/13/23 1152   BP: 112/60   BP Location: Right arm   Pulse: 99   Resp: 18   SpO2: 100%  Comment: 3lpm   Weight: 73  "kg (161 lb)   Height: 167.6 cm (66\")       Physical Exam  Constitutional:       General: She is not in acute distress.     Appearance: Normal appearance. She is well-developed. She is not diaphoretic.   HENT:      Head: Normocephalic and atraumatic.   Eyes:      General: No scleral icterus.     Pupils: Pupils are equal, round, and reactive to light.   Neck:      Trachea: No tracheal deviation.   Cardiovascular:      Rate and Rhythm: Normal rate and regular rhythm.      Heart sounds: Normal heart sounds. No murmur heard.    No friction rub. No gallop.      Comments: Normal JVD.  Pulmonary:      Effort: Pulmonary effort is normal. No respiratory distress.      Breath sounds: No stridor. No wheezing or rales.      Comments: Prolonged expiratory phase.  On continuous supplemental O2  Chest:      Chest wall: No tenderness.   Abdominal:      General: Bowel sounds are normal. There is no distension.      Palpations: Abdomen is soft.      Tenderness: There is no abdominal tenderness. There is no guarding or rebound.   Musculoskeletal:         General: No swelling. Normal range of motion.      Cervical back: Neck supple. No tenderness.   Lymphadenopathy:      Cervical: No cervical adenopathy.   Skin:     General: Skin is warm and dry.      Findings: No erythema.   Neurological:      General: No focal deficit present.      Mental Status: She is alert and oriented to person, place, and time.   Psychiatric:         Mood and Affect: Mood normal.         Behavior: Behavior normal.         Results Review:   I reviewed the patient's new clinical results.  I personally viewed and interpreted the patient's EKG/Telemetry data      ECG 12 Lead    Date/Time: 2/13/2023 12:23 PM  Performed by: Taiwo Curry MD  Authorized by: Taiwo Curry MD   Comparison: not compared with previous ECG   Rhythm: sinus rhythm  Rate: normal  QRS axis: normal  Other findings: prolonged QTc interval    Clinical impression: abnormal " EKG            Assessment / Plan:     1.  Paroxysmal atrial fibrillation   -- ECG today continues to show NSR  --Rare episodes of palpitations, typically brief in duration  --Last cardiac monitoring 2020 showed no recurrent episodes of PAF, reported symptoms corresponded to NSR  --CHADS2-VASc score of 4, continue Eliquis for CVA prophylaxis  --Follow-up in 6 months, sooner if required     2.  Coronary artery disease   --History of nonobstructive coronary artery disease based on remote coronary angiography  --Intermittent episodes of chest pain, atypical in character and possibly related to underlying COPD  --Given chest pain in the setting of known nonobstructive CAD, will proceed with PET MPS to further rule out ischemia contributing to current symptoms  --Continue atorvastatin      3.  Essential hypertension  --  BP adequately controlled with current medications     4.  COPD  -- Managed by pulmonology      Follow Up:   Return in about 6 months (around 8/13/2023).      Thank you for allowing me to participate in the care of your patient. Please to not hesitate to contact me with additional questions or concerns.     VIDAL Curry MD  Interventional Cardiology   02/13/2023  11:47 EST

## 2023-02-27 DIAGNOSIS — I48.0 PAROXYSMAL ATRIAL FIBRILLATION: ICD-10-CM

## 2023-03-06 ENCOUNTER — TELEPHONE (OUTPATIENT)
Dept: INTERNAL MEDICINE | Facility: CLINIC | Age: 78
End: 2023-03-06
Payer: MEDICARE

## 2023-03-06 RX ORDER — METHOCARBAMOL 500 MG/1
TABLET, FILM COATED ORAL
Qty: 30 TABLET | Refills: 1 | Status: SHIPPED | OUTPATIENT
Start: 2023-03-06

## 2023-03-06 RX ORDER — ATORVASTATIN CALCIUM 20 MG/1
TABLET, FILM COATED ORAL
Qty: 90 TABLET | Refills: 3 | Status: SHIPPED | OUTPATIENT
Start: 2023-03-06

## 2023-03-06 NOTE — TELEPHONE ENCOUNTER
Caller: Malu Aguilar    Relationship: Emergency Contact    Best call back number:     Requested Prescriptions:   Requested Prescriptions      No prescriptions requested or ordered in this encounter      METHOCARBAMOL 750 MG    Pharmacy where request should be sent:       [facility] [address] [place]  - [phone] [facility] [phone] FX     Does the patient have less than a 3 day supply:    [x] Yes  [] No    Would you like a call back once the refill request has been completed: [] Yes [x] No    If the office needs to give you a call back, can they leave a voicemail: [] Yes [x] No    Samira Sanches, PCT   03/06/23 14:29 EST

## 2023-03-06 NOTE — TELEPHONE ENCOUNTER
Rx Refill Note  Requested Prescriptions     Pending Prescriptions Disp Refills   • methocarbamol (ROBAXIN) 750 MG tablet 30 tablet 1     Sig: Take 1 tablet by mouth 4 (Four) Times a Day As Needed for Muscle Spasms.      Last office visit with prescribing clinician: 2/9/2023   Last telemedicine visit with prescribing clinician: 3/14/2023   Next office visit with prescribing clinician: 3/14/2023                         Would you like a call back once the refill request has been completed: [] Yes [] No    If the office needs to give you a call back, can they leave a voicemail: [] Yes [] No    Britt Peoples LPN  03/06/23, 14:49 EST

## 2023-03-06 NOTE — TELEPHONE ENCOUNTER
Rx Refill Note  Requested Prescriptions     Pending Prescriptions Disp Refills   • atorvastatin (LIPITOR) 20 MG tablet [Pharmacy Med Name: Atorvastatin Calcium 20MG TABS] 30 tablet      Sig: TAKE 1 TABLET BY MOUTH DAILY      Last office visit with prescribing clinician: 2/9/2023     Next office visit with prescribing clinician: 3/6/2023                         Would you like a call back once the refill request has been completed: [] Yes [] No    If the office needs to give you a call back, can they leave a voicemail: [] Yes [] No    Jackie Leone MA  03/06/23, 17:21 EST

## 2023-03-07 DIAGNOSIS — J44.1 COPD WITH ACUTE EXACERBATION: ICD-10-CM

## 2023-03-08 ENCOUNTER — TELEPHONE (OUTPATIENT)
Dept: INTERNAL MEDICINE | Facility: CLINIC | Age: 78
End: 2023-03-08
Payer: MEDICARE

## 2023-03-08 RX ORDER — ALBUTEROL SULFATE 90 UG/1
AEROSOL, METERED RESPIRATORY (INHALATION)
Qty: 18 G | Refills: 5 | Status: SHIPPED | OUTPATIENT
Start: 2023-03-08

## 2023-03-08 NOTE — TELEPHONE ENCOUNTER
NM PET/CT Whole Body [[id number]] (Order [id number])  Order  Date: 2/18/2022 Department: [facility] PRIMARY CARE Ordering/Authorizing: Paul Quinteros MD      This order is too old to be used and has been cancelled.    Lan

## 2023-03-14 DIAGNOSIS — F41.9 ANXIETY: ICD-10-CM

## 2023-03-14 RX ORDER — CLONAZEPAM 1 MG/1
1 TABLET ORAL DAILY PRN
Qty: 30 TABLET | Refills: 0 | Status: SHIPPED | OUTPATIENT
Start: 2023-03-14

## 2023-03-14 NOTE — TELEPHONE ENCOUNTER
Caller: Malu Aguilar    Relationship: Emergency Contact    Best call back number: [phone]    Requested Prescriptions:   Requested Prescriptions     Pending Prescriptions Disp Refills   • clonazePAM (KlonoPIN) 1 MG tablet 30 tablet 0     Sig: Take 1 tablet by mouth Daily As Needed for Anxiety.        Pharmacy where request should be sent: [facility] [facility] [address]  - [phone]  - [phone] FX     Additional details provided by patient: MALU STATED THAT PATIENT HAS 2 TABLETS LEFT AND RESCHEDULED HER APPOINTMENT TODAY DUE TO HER NOT FEELING WELL     Does the patient have less than a 3 day supply:  [x] Yes  [] No    Would you like a call back once the refill request has been completed: [x] Yes [] No    If the office needs to give you a call back, can they leave a voicemail: [x] Yes [] No    Rafa Alejandre Rep   03/14/23 10:28 EDT

## 2023-03-14 NOTE — TELEPHONE ENCOUNTER
Rx Refill Note  Requested Prescriptions     Pending Prescriptions Disp Refills   • clonazePAM (KlonoPIN) 1 MG tablet 30 tablet 0     Sig: Take 1 tablet by mouth Daily As Needed for Anxiety.      Last office visit with prescribing clinician: 2/9/2023     Next office visit with prescribing clinician: 5/15/2023                         Would you like a call back once the refill request has been completed: [] Yes [] No    If the office needs to give you a call back, can they leave a voicemail: [] Yes [] No    Jackie Leone MA  03/14/23, 12:37 EDT     No controlled substance agreement on file  No UDS on file  Last medication fill 01/19/2023

## 2023-03-15 ENCOUNTER — HOSPITAL ENCOUNTER (OUTPATIENT)
Dept: GENERAL RADIOLOGY | Facility: HOSPITAL | Age: 78
Discharge: HOME | End: 2023-03-15
Payer: MEDICARE

## 2023-03-15 ENCOUNTER — LAB (OUTPATIENT)
Dept: LAB | Facility: HOSPITAL | Age: 78
End: 2023-03-15
Payer: MEDICARE

## 2023-03-15 PROCEDURE — 85025 COMPLETE CBC W/AUTO DIFF WBC: CPT | Performed by: INTERNAL MEDICINE

## 2023-03-15 PROCEDURE — 80048 BASIC METABOLIC PNL TOTAL CA: CPT | Performed by: INTERNAL MEDICINE

## 2023-03-15 PROCEDURE — 71046 X-RAY EXAM CHEST 2 VIEWS: CPT

## 2023-03-15 PROCEDURE — 36415 COLL VENOUS BLD VENIPUNCTURE: CPT | Performed by: INTERNAL MEDICINE

## 2023-03-16 ENCOUNTER — TELEPHONE (OUTPATIENT)
Dept: INTERNAL MEDICINE | Facility: CLINIC | Age: 78
End: 2023-03-16
Payer: MEDICARE

## 2023-03-16 ENCOUNTER — APPOINTMENT (OUTPATIENT)
Dept: GENERAL RADIOLOGY | Facility: HOSPITAL | Age: 78
End: 2023-03-16
Payer: MEDICARE

## 2023-03-16 ENCOUNTER — HOSPITAL ENCOUNTER (EMERGENCY)
Facility: HOSPITAL | Age: 78
Discharge: HOME | End: 2023-03-17
Attending: STUDENT IN AN ORGANIZED HEALTH CARE EDUCATION/TRAINING PROGRAM | Admitting: STUDENT IN AN ORGANIZED HEALTH CARE EDUCATION/TRAINING PROGRAM
Payer: MEDICARE

## 2023-03-16 DIAGNOSIS — J44.1 COPD EXACERBATION: ICD-10-CM

## 2023-03-16 DIAGNOSIS — J44.1 COPD WITH EXACERBATION: Primary | ICD-10-CM

## 2023-03-16 DIAGNOSIS — D64.9 ANEMIA, UNSPECIFIED TYPE: Primary | ICD-10-CM

## 2023-03-16 DIAGNOSIS — J34.89 SINUS PAIN: ICD-10-CM

## 2023-03-16 LAB
ALBUMIN SERPL-MCNC: 4 G/DL (ref 3.5–5.2)
ALBUMIN/GLOB SERPL: 1.4 G/DL
ALP SERPL-CCNC: 83 U/L (ref 39–117)
ALT SERPL W P-5'-P-CCNC: 15 U/L (ref 1–33)
ANION GAP SERPL CALCULATED.3IONS-SCNC: 3.8 MMOL/L (ref 5–15)
ANION GAP SERPL CALCULATED.3IONS-SCNC: 8 MMOL/L (ref 5–15)
ANISOCYTOSIS BLD QL: NORMAL
AST SERPL-CCNC: 23 U/L (ref 1–32)
BASOPHILS # BLD AUTO: 0.05 10*3/MM3 (ref 0–0.2)
BASOPHILS NFR BLD AUTO: 0.7 % (ref 0–1.5)
BASOPHILS NFR BLD AUTO: 0.7 % (ref 0–1.5)
BASOPHILS NFR BLD AUTO: 0.9 % (ref 0–1.5)
BILIRUB SERPL-MCNC: <0.2 MG/DL (ref 0–1.2)
BUN SERPL-MCNC: 16 MG/DL (ref 8–23)
BUN SERPL-MCNC: 20 MG/DL (ref 8–23)
BUN/CREAT SERPL: 19.8 (ref 7–25)
BUN/CREAT SERPL: 30.3 (ref 7–25)
CALCIUM SPEC-SCNC: 9.1 MG/DL (ref 8.6–10.5)
CALCIUM SPEC-SCNC: 9.6 MG/DL (ref 8.6–10.5)
CHLORIDE SERPL-SCNC: 101 MMOL/L (ref 98–107)
CHLORIDE SERPL-SCNC: 97 MMOL/L (ref 98–107)
CLUMPED PLATELETS: PRESENT
CO2 SERPL-SCNC: 34 MMOL/L (ref 22–29)
CO2 SERPL-SCNC: 37.2 MMOL/L (ref 22–29)
CREAT SERPL-MCNC: 0.66 MG/DL (ref 0.57–1)
CREAT SERPL-MCNC: 0.81 MG/DL (ref 0.57–1)
DEPRECATED RDW RBC AUTO: 43.7 FL (ref 37–54)
DEPRECATED RDW RBC AUTO: 46.6 FL (ref 37–54)
DEPRECATED RDW RBC AUTO: 47.6 FL (ref 37–54)
EGFRCR SERPLBLD CKD-EPI 2021: 75.3 ML/MIN/1.73
EGFRCR SERPLBLD CKD-EPI 2021: 91 ML/MIN/1.73
ELLIPTOCYTES BLD QL SMEAR: NORMAL
EOSINOPHIL # BLD AUTO: 0.07 10*3/MM3 (ref 0–0.4)
EOSINOPHIL # BLD AUTO: 0.1 10*3/MM3 (ref 0–0.4)
EOSINOPHIL # BLD AUTO: 0.15 10*3/MM3 (ref 0–0.4)
EOSINOPHIL NFR BLD AUTO: 1.3 % (ref 0.3–6.2)
EOSINOPHIL NFR BLD AUTO: 1.4 % (ref 0.3–6.2)
EOSINOPHIL NFR BLD AUTO: 2 % (ref 0.3–6.2)
ERYTHROCYTE [DISTWIDTH] IN BLOOD BY AUTOMATED COUNT: 15.9 % (ref 12.3–15.4)
ERYTHROCYTE [DISTWIDTH] IN BLOOD BY AUTOMATED COUNT: 17.1 % (ref 12.3–15.4)
ERYTHROCYTE [DISTWIDTH] IN BLOOD BY AUTOMATED COUNT: 17.2 % (ref 12.3–15.4)
GLOBULIN UR ELPH-MCNC: 2.8 GM/DL
GLUCOSE SERPL-MCNC: 126 MG/DL (ref 65–99)
GLUCOSE SERPL-MCNC: 89 MG/DL (ref 65–99)
HCT VFR BLD AUTO: 27.6 % (ref 34–46.6)
HCT VFR BLD AUTO: 28.1 % (ref 34–46.6)
HCT VFR BLD AUTO: 30 % (ref 34–46.6)
HGB BLD-MCNC: 7.5 G/DL (ref 12–15.9)
HGB BLD-MCNC: 7.9 G/DL (ref 12–15.9)
HGB BLD-MCNC: 8.2 G/DL (ref 12–15.9)
HYPOCHROMIA BLD QL: NORMAL
IMM GRANULOCYTES # BLD AUTO: 0.01 10*3/MM3 (ref 0–0.05)
IMM GRANULOCYTES # BLD AUTO: 0.02 10*3/MM3 (ref 0–0.05)
IMM GRANULOCYTES # BLD AUTO: 0.03 10*3/MM3 (ref 0–0.05)
IMM GRANULOCYTES NFR BLD AUTO: 0.1 % (ref 0–0.5)
IMM GRANULOCYTES NFR BLD AUTO: 0.3 % (ref 0–0.5)
IMM GRANULOCYTES NFR BLD AUTO: 0.6 % (ref 0–0.5)
LYMPHOCYTES # BLD AUTO: 1.19 10*3/MM3 (ref 0.7–3.1)
LYMPHOCYTES # BLD AUTO: 1.26 10*3/MM3 (ref 0.7–3.1)
LYMPHOCYTES # BLD AUTO: 1.56 10*3/MM3 (ref 0.7–3.1)
LYMPHOCYTES NFR BLD AUTO: 16.8 % (ref 19.6–45.3)
LYMPHOCYTES NFR BLD AUTO: 21.5 % (ref 19.6–45.3)
LYMPHOCYTES NFR BLD AUTO: 22.6 % (ref 19.6–45.3)
MCH RBC QN AUTO: 20.7 PG (ref 26.6–33)
MCH RBC QN AUTO: 20.8 PG (ref 26.6–33)
MCH RBC QN AUTO: 21.4 PG (ref 26.6–33)
MCHC RBC AUTO-ENTMCNC: 27.2 G/DL (ref 31.5–35.7)
MCHC RBC AUTO-ENTMCNC: 27.3 G/DL (ref 31.5–35.7)
MCHC RBC AUTO-ENTMCNC: 28.1 G/DL (ref 31.5–35.7)
MCV RBC AUTO: 75.8 FL (ref 79–97)
MCV RBC AUTO: 75.9 FL (ref 79–97)
MCV RBC AUTO: 76.5 FL (ref 79–97)
MICROCYTES BLD QL: NORMAL
MONOCYTES # BLD AUTO: 0.29 10*3/MM3 (ref 0.1–0.9)
MONOCYTES # BLD AUTO: 0.5 10*3/MM3 (ref 0.1–0.9)
MONOCYTES # BLD AUTO: 0.5 10*3/MM3 (ref 0.1–0.9)
MONOCYTES NFR BLD AUTO: 5.5 % (ref 5–12)
MONOCYTES NFR BLD AUTO: 6.7 % (ref 5–12)
MONOCYTES NFR BLD AUTO: 6.9 % (ref 5–12)
NEUTROPHILS NFR BLD AUTO: 3.64 10*3/MM3 (ref 1.7–7)
NEUTROPHILS NFR BLD AUTO: 5.04 10*3/MM3 (ref 1.7–7)
NEUTROPHILS NFR BLD AUTO: 5.52 10*3/MM3 (ref 1.7–7)
NEUTROPHILS NFR BLD AUTO: 69.1 % (ref 42.7–76)
NEUTROPHILS NFR BLD AUTO: 69.2 % (ref 42.7–76)
NEUTROPHILS NFR BLD AUTO: 73.7 % (ref 42.7–76)
NRBC BLD AUTO-RTO: 0 /100 WBC (ref 0–0.2)
NT-PROBNP SERPL-MCNC: 975.8 PG/ML (ref 0–1800)
PLATELET # BLD AUTO: 336 10*3/MM3 (ref 140–450)
PLATELET # BLD AUTO: 359 10*3/MM3 (ref 140–450)
PLATELET # BLD AUTO: ABNORMAL 10*3/UL
PMV BLD AUTO: 10.2 FL (ref 6–12)
PMV BLD AUTO: 10.2 FL (ref 6–12)
PMV BLD AUTO: ABNORMAL FL
POTASSIUM SERPL-SCNC: 3.8 MMOL/L (ref 3.5–5.2)
POTASSIUM SERPL-SCNC: 4.3 MMOL/L (ref 3.5–5.2)
PROT SERPL-MCNC: 6.8 G/DL (ref 6–8.5)
RBC # BLD AUTO: 3.61 10*6/MM3 (ref 3.77–5.28)
RBC # BLD AUTO: 3.7 10*6/MM3 (ref 3.77–5.28)
RBC # BLD AUTO: 3.96 10*6/MM3 (ref 3.77–5.28)
SMALL PLATELETS BLD QL SMEAR: NORMAL
SODIUM SERPL-SCNC: 139 MMOL/L (ref 136–145)
SODIUM SERPL-SCNC: 142 MMOL/L (ref 136–145)
TARGETS BLD QL SMEAR: NORMAL
TROPONIN T SERPL HS-MCNC: 22 NG/L
WBC MORPH BLD: NORMAL
WBC NRBC COR # BLD: 5.27 10*3/MM3 (ref 3.4–10.8)
WBC NRBC COR # BLD: 7.27 10*3/MM3 (ref 3.4–10.8)
WBC NRBC COR # BLD: 7.49 10*3/MM3 (ref 3.4–10.8)

## 2023-03-16 PROCEDURE — 96374 THER/PROPH/DIAG INJ IV PUSH: CPT

## 2023-03-16 PROCEDURE — 80053 COMPREHEN METABOLIC PANEL: CPT | Performed by: PHYSICIAN ASSISTANT

## 2023-03-16 PROCEDURE — 85025 COMPLETE CBC W/AUTO DIFF WBC: CPT | Performed by: STUDENT IN AN ORGANIZED HEALTH CARE EDUCATION/TRAINING PROGRAM

## 2023-03-16 PROCEDURE — 85007 BL SMEAR W/DIFF WBC COUNT: CPT | Performed by: PHYSICIAN ASSISTANT

## 2023-03-16 PROCEDURE — 99284 EMERGENCY DEPT VISIT MOD MDM: CPT

## 2023-03-16 PROCEDURE — 99283 EMERGENCY DEPT VISIT LOW MDM: CPT

## 2023-03-16 PROCEDURE — 93005 ELECTROCARDIOGRAM TRACING: CPT | Performed by: PHYSICIAN ASSISTANT

## 2023-03-16 PROCEDURE — 84484 ASSAY OF TROPONIN QUANT: CPT | Performed by: PHYSICIAN ASSISTANT

## 2023-03-16 PROCEDURE — 71045 X-RAY EXAM CHEST 1 VIEW: CPT

## 2023-03-16 PROCEDURE — 83880 ASSAY OF NATRIURETIC PEPTIDE: CPT | Performed by: PHYSICIAN ASSISTANT

## 2023-03-16 PROCEDURE — 36415 COLL VENOUS BLD VENIPUNCTURE: CPT

## 2023-03-16 PROCEDURE — 85025 COMPLETE CBC W/AUTO DIFF WBC: CPT | Performed by: PHYSICIAN ASSISTANT

## 2023-03-16 RX ORDER — SODIUM CHLORIDE 0.9 % (FLUSH) 0.9 %
10 SYRINGE (ML) INJECTION AS NEEDED
Status: DISCONTINUED | OUTPATIENT
Start: 2023-03-16 | End: 2023-03-17 | Stop reason: HOSPADM

## 2023-03-16 NOTE — TELEPHONE ENCOUNTER
Caller: Malu Aguilar    Relationship: Emergency Contact    Best call back number:[phone]       What was the call regarding: PATIENT'S DAUGHTER CALLED TO REQUEST A PRESCRIPTION FOR A REPLACEMENT HOES AND MASK FOR A NEBULIZER.  THIS WILL NEEDS TO GO TO [facility] [phone].    Do you require a callback:YES

## 2023-03-17 VITALS
RESPIRATION RATE: 16 BRPM | OXYGEN SATURATION: 95 % | HEART RATE: 97 BPM | HEIGHT: 65 IN | SYSTOLIC BLOOD PRESSURE: 148 MMHG | TEMPERATURE: 98.9 F | DIASTOLIC BLOOD PRESSURE: 109 MMHG | BODY MASS INDEX: 26.66 KG/M2 | WEIGHT: 160 LBS

## 2023-03-17 LAB
GEN 5 2HR TROPONIN T REFLEX: 26 NG/L
HOLD SPECIMEN: NORMAL
TROPONIN T DELTA: 4 NG/L
WHOLE BLOOD HOLD SPECIMEN: NORMAL

## 2023-03-17 PROCEDURE — 96374 THER/PROPH/DIAG INJ IV PUSH: CPT

## 2023-03-17 PROCEDURE — 25010000002 METHYLPREDNISOLONE PER 125 MG: Performed by: PHYSICIAN ASSISTANT

## 2023-03-17 PROCEDURE — 84484 ASSAY OF TROPONIN QUANT: CPT | Performed by: PHYSICIAN ASSISTANT

## 2023-03-17 RX ORDER — AMOXICILLIN AND CLAVULANATE POTASSIUM 875; 125 MG/1; MG/1
1 TABLET, FILM COATED ORAL EVERY 12 HOURS
Qty: 20 TABLET | Refills: 0 | Status: SHIPPED | OUTPATIENT
Start: 2023-03-17 | End: 2023-03-27

## 2023-03-17 RX ORDER — PREDNISONE 20 MG/1
40 TABLET ORAL DAILY
Qty: 10 TABLET | Refills: 0 | Status: SHIPPED | OUTPATIENT
Start: 2023-03-17 | End: 2023-03-17

## 2023-03-17 RX ORDER — METHYLPREDNISOLONE SODIUM SUCCINATE 125 MG/2ML
125 INJECTION, POWDER, LYOPHILIZED, FOR SOLUTION INTRAMUSCULAR; INTRAVENOUS ONCE
Status: COMPLETED | OUTPATIENT
Start: 2023-03-17 | End: 2023-03-17

## 2023-03-17 RX ORDER — FLUCONAZOLE 150 MG/1
150 TABLET ORAL ONCE
Qty: 1 TABLET | Refills: 0 | Status: SHIPPED | OUTPATIENT
Start: 2023-03-17 | End: 2023-03-17

## 2023-03-17 RX ADMIN — METHYLPREDNISOLONE SODIUM SUCCINATE 125 MG: 125 INJECTION, POWDER, FOR SOLUTION INTRAMUSCULAR; INTRAVENOUS at 00:21

## 2023-03-17 NOTE — DISCHARGE INSTRUCTIONS
We initially called some steroids into the pharmacy however after reconsideration I do not believe that this is a good idea for you to take if there is any chance that you have GI bleeding.  The prescription has been sent and we cannot call back however please do not fill it and do not take the prednisone.

## 2023-03-17 NOTE — ED PROVIDER NOTES
Subjective  History of Present Illness:    Chief Complaint:   Chief Complaint   Patient presents with   • Abnormal Lab      History of Present Illness: Gabi Dudley is a 78 y.o. female who presents to the emergency department complaining of low hemoglobin on outpatient labs.  Patient comes in complaining of low hemoglobin drawn on outpatient labs recently.  She states in January of this year she was admitted with pneumonia and was had labs to check for anemia ordered at that appointment, due to busy schedule she was unable to get those labs performed until just a few days ago.  She was called [facility] and told her hemoglobin was 7.9 and to come to the ER as she had had a significant drop in her hemoglobin.  Patient does have A-fib on Eliquis.  Denies blood in her stool.  Is a COPD patient oxygen dependent 2 to 3 L continuously.  States she has had exertional dyspnea tightness in her chest and fatigue with exertion ongoing since January, unchanged today.  She has no history of ACS.  Has referral placed to gastroenterology but has yet to have an appointment scheduled.  Denies any history of GI bleeding.  Onset: Labs noted none a day or 2 ago  Duration: Ongoing, shortness of breath and exertional dyspnea present since January  Exacerbating / Alleviating factors: Worse with activity  Associated symptoms: None      Nurses Notes reviewed and agree, including vitals, allergies, social history and prior medical history.     Review of Systems   Constitutional: Negative.    HENT: Negative.    Eyes: Negative.    Respiratory: Positive for chest tightness and shortness of breath.    Cardiovascular: Negative.    Gastrointestinal: Negative.  Negative for blood in stool.   Genitourinary: Negative.    Musculoskeletal: Negative.    Skin: Negative.    Neurological: Negative.    Psychiatric/Behavioral: Negative.        Past Medical History:   Diagnosis Date   • Adrenal adenoma    • Anemia    • Arrhythmia    • Asthma    • Atrial  fibrillation (HCC)    • Back pain    • Benign colonic polyp    • Benign tumor of adrenal gland    • Cataract     bilateral   • Cholelithiasis    • Chronic bronchitis (HCC)    • Chronic bronchitis with COPD (chronic obstructive pulmonary disease) (HCC)    • COPD (chronic obstructive pulmonary disease) (HCC) 2005   • Coronary artery disease    • Depression    • Elevated cholesterol    • Environmental and seasonal allergies    • Fibromyalgia    • Gastritis    • Generalized anxiety disorder    • GERD (gastroesophageal reflux disease)    • H/O mammogram    • Hemorrhoids    • History of blood transfusion 1985   • History of blood transfusion 1985   • History of echocardiogram    • History of endometriosis    • History of nuclear stress test    • Hypertension    • IBS (irritable bowel syndrome)    • Impaired functional mobility, balance, gait, and endurance    • Impaired mobility    • Inverted nipple    • Kidney disease    • Kidney stone    • Liver cyst    • Nodular radiologic density    • Nodule of left lung    • On home oxygen therapy     2 liters NC QHS   • Osteoarthritis    • Osteoporosis    • PONV (postoperative nausea and vomiting)    • Renal cyst    • Sinus problem     2014   • Sinusitis    • Skin cancer     basal cell carcinoma   • SOB (shortness of breath)    • Tobacco use    • Urinary frequency    • Vitamin D deficiency    • Wears glasses    • Wears partial dentures     upper plate       Allergies:    Doxycycline      Past Surgical History:   Procedure Laterality Date   • APPENDECTOMY  1980s   • CARDIAC CATHETERIZATION  2003   • CATARACT EXTRACTION  2013    both eyes   • CHOLECYSTECTOMY WITH INTRAOPERATIVE CHOLANGIOGRAM N/A 10/30/2020    Procedure: CHOLECYSTECTOMY LAPAROSCOPIC INTRAOPERATIVE CHOLANGIOGRAPHY;  Surgeon: Sima Moses MD;  Location: [facility];  Service: General;  Laterality: N/A;   • COLONOSCOPY  03/11/2013   • COLONOSCOPY  06/21/2016   • COLONOSCOPY N/A 7/24/2019    Procedure: COLONOSCOPY W/  "COLD FORCEP POLYPECTOMIES; HOT SNARE POLYPECTOMIES; COLD SNARE POLYPECTOMY;  Surgeon: Goyo Nunez MD;  Location: [facility] ENDOSCOPY;  Service: Gastroenterology   • COLONOSCOPY W/ BIOPSIES AND POLYPECTOMY     • EXPLORATORY LAPAROTOMY N/A 2020    Procedure: colectomy, right, closure of enterotomy x 2, reduction of internal volvulus;  Surgeon: Sima Moses MD;  Location: [facility] OR;  Service: General;  Laterality: N/A;   • HYSTERECTOMY      partial   • LYSIS OF ABDOMINAL ADHESIONS     • TONSILLECTOMY     • UPPER GASTROINTESTINAL ENDOSCOPY  2015   • VAGINAL DELIVERY      x2         Social History     Socioeconomic History   • Marital status:    Tobacco Use   • Smoking status: Former     Packs/day: 0.25     Years: 30.00     Pack years: 7.50     Types: Cigarettes     Quit date: 2020     Years since quittin.3     Passive exposure: Past   • Smokeless tobacco: Never   Vaping Use   • Vaping Use: Never used   Substance and Sexual Activity   • Alcohol use: No   • Drug use: No   • Sexual activity: Defer         Family History   Problem Relation Age of Onset   • Stomach cancer Brother    • Colon cancer Maternal Aunt    • Brain cancer Father    • Arthritis Father    • Hypertension Father    • Lung cancer Paternal Grandfather    • Breast cancer Neg Hx    • Ovarian cancer Neg Hx        Objective  Physical Exam:  /82   Pulse 97   Temp 98.9 °F (37.2 °C) (Oral)   Resp 16   Ht 165.1 cm (65\")   Wt 72.6 kg (160 lb)   SpO2 95%   BMI 26.63 kg/m²      Physical Exam  Vitals and nursing note reviewed.   Constitutional:       General: She is not in acute distress.     Appearance: Normal appearance. She is normal weight. She is not ill-appearing, toxic-appearing or diaphoretic.   HENT:      Head: Normocephalic and atraumatic.      Nose: Nose normal.   Eyes:      Extraocular Movements: Extraocular movements intact.   Cardiovascular:      Rate and Rhythm: Normal rate and regular rhythm. "   Pulmonary:      Effort: Pulmonary effort is normal. No respiratory distress.      Breath sounds: No decreased air movement. Wheezing present.   Abdominal:      General: Abdomen is flat.      Palpations: Abdomen is soft.      Tenderness: There is no abdominal tenderness. There is no guarding or rebound.   Musculoskeletal:         General: Normal range of motion.      Cervical back: Normal range of motion.   Skin:     General: Skin is warm and dry.   Neurological:      General: No focal deficit present.      Mental Status: She is alert. Mental status is at baseline.   Psychiatric:         Mood and Affect: Mood normal.         Behavior: Behavior normal.           Procedures    ED Course:    ED Course as of 03/17/23 0149   Thu Mar 16, 2023   2339 EKG interpreted by me, normal sinus rhythm with no concerning ST changes noted, rate of 88 [JE]      ED Course User Index  [JE] John Norton MD       Lab Results (last 24 hours)     Procedure Component Value Units Date/Time    CBC & Differential [[id number]]  (Abnormal) Collected: 03/16/23 2259    Specimen: Blood Updated: 03/16/23 2337    Narrative:      The following orders were created for panel order CBC & Differential.  Procedure                               Abnormality         Status                     ---------                               -----------         ------                     CBC Auto Differential[[id number]]        Abnormal            Final result               Scan Slide[[id number]]                                       Final result                 Please view results for these tests on the individual orders.    Comprehensive Metabolic Panel [[id number]]  (Abnormal) Collected: 03/16/23 2259    Specimen: Blood Updated: 03/16/23 2321     Glucose 126 mg/dL      BUN 20 mg/dL      Creatinine 0.66 mg/dL      Sodium 139 mmol/L      Potassium 3.8 mmol/L      Comment: Slight hemolysis detected by analyzer. Results may be affected.        Chloride 97 mmol/L       CO2 34.0 mmol/L      Calcium 9.1 mg/dL      Total Protein 6.8 g/dL      Albumin 4.0 g/dL      ALT (SGPT) 15 U/L      AST (SGOT) 23 U/L      Alkaline Phosphatase 83 U/L      Total Bilirubin <0.2 mg/dL      Globulin 2.8 gm/dL      A/G Ratio 1.4 g/dL      BUN/Creatinine Ratio 30.3     Anion Gap 8.0 mmol/L      eGFR 91.0 mL/min/1.73     Narrative:      GFR Normal >60  Chronic Kidney Disease <60  Kidney Failure <15    The GFR formula is only valid for adults with stable renal function between ages 18 and 70.    BNP [[id number]]  (Normal) Collected: 03/16/23 2259    Specimen: Blood Updated: 03/16/23 2324     proBNP 975.8 pg/mL     Narrative:      Among patients with dyspnea, NT-proBNP is highly sensitive for the detection of acute congestive heart failure. In addition NT-proBNP of <300 pg/ml effectively rules out acute congestive heart failure with 99% negative predictive value.    Results may be falsely decreased if patient taking Biotin.      High Sensitivity Troponin T [[id number]]  (Abnormal) Collected: 03/16/23 2259    Specimen: Blood Updated: 03/16/23 2324     HS Troponin T 22 ng/L     Narrative:      High Sensitive Troponin T Reference Range:  <10.0 ng/L- Negative Female for AMI  <15.0 ng/L- Negative Male for AMI  >=10 - Abnormal Female indicating possible myocardial injury.  >=15 - Abnormal Male indicating possible myocardial injury.   Clinicians would have to utilize clinical acumen, EKG, Troponin, and serial changes to determine if it is an Acute Myocardial Infarction or myocardial injury due to an underlying chronic condition.         CBC Auto Differential [[id number]]  (Abnormal) Collected: 03/16/23 2259    Specimen: Blood Updated: 03/16/23 2334     WBC 5.27 10*3/mm3      RBC 3.96 10*6/mm3      Hemoglobin 8.2 g/dL      Hematocrit 30.0 %      MCV 75.8 fL      MCH 20.7 pg      MCHC 27.3 g/dL      RDW 17.1 %      RDW-SD 46.6 fl      MPV --     Comment: Unable to perform        Platelets --     Comment: Unable to  report due to platelet clumps        Neutrophil % 69.1 %      Lymphocyte % 22.6 %      Monocyte % 5.5 %      Eosinophil % 1.3 %      Basophil % 0.9 %      Immature Grans % 0.6 %      Neutrophils, Absolute 3.64 10*3/mm3      Lymphocytes, Absolute 1.19 10*3/mm3      Monocytes, Absolute 0.29 10*3/mm3      Eosinophils, Absolute 0.07 10*3/mm3      Basophils, Absolute 0.05 10*3/mm3      Immature Grans, Absolute 0.03 10*3/mm3      nRBC 0.0 /100 WBC     Scan Slide [[id number]] Collected: 03/16/23 2259    Specimen: Blood Updated: 03/16/23 2337     Anisocytosis Mod/2+     Elliptocytes Slight/1+     Hypochromia Mod/2+     Microcytes Slight/1+     Target Cells Slight/1+     WBC Morphology Normal     Platelet Estimate --     Comment: Unable to report due to platelet clumps        Clumped Platelets Present    CBC Auto Differential [[id number]]  (Abnormal) Collected: 03/16/23 2347    Specimen: Blood Updated: 03/16/23 2357     WBC 7.27 10*3/mm3      RBC 3.61 10*6/mm3      Hemoglobin 7.5 g/dL      Hematocrit 27.6 %      MCV 76.5 fL      MCH 20.8 pg      MCHC 27.2 g/dL      RDW 17.2 %      RDW-SD 47.6 fl      MPV 10.2 fL      Platelets 336 10*3/mm3      Neutrophil % 69.2 %      Lymphocyte % 21.5 %      Monocyte % 6.9 %      Eosinophil % 1.4 %      Basophil % 0.7 %      Immature Grans % 0.3 %      Neutrophils, Absolute 5.04 10*3/mm3      Lymphocytes, Absolute 1.56 10*3/mm3      Monocytes, Absolute 0.50 10*3/mm3      Eosinophils, Absolute 0.10 10*3/mm3      Basophils, Absolute 0.05 10*3/mm3      Immature Grans, Absolute 0.02 10*3/mm3      nRBC 0.0 /100 WBC     High Sensitivity Troponin T 2Hr [[id number]]  (Abnormal) Collected: 03/17/23 0102    Specimen: Blood Updated: 03/17/23 0131     HS Troponin T 26 ng/L      Troponin T Delta 4 ng/L     Narrative:      High Sensitive Troponin T Reference Range:  <10.0 ng/L- Negative Female for AMI  <15.0 ng/L- Negative Male for AMI  >=10 - Abnormal Female indicating possible myocardial injury.  >=15 -  Abnormal Male indicating possible myocardial injury.   Clinicians would have to utilize clinical acumen, EKG, Troponin, and serial changes to determine if it is an Acute Myocardial Infarction or myocardial injury due to an underlying chronic condition.                XR Chest PA & Lateral    Result Date: 3/16/2023  CLINICAL INDICATION:  brochitis; J42-Unspecified chronic bronchitis shortness of breath.  EXAMINATION TECHNIQUE: XR CHEST PA AND LATERAL-  COMPARISON: Radiographs 01/23/2023.  FINDINGS: Stable cardiomegaly. Aortic atherosclerosis and tortuosity. Prominent pulmonary gagan with enlarged pulmonary arteries. Mild diffuse bronchial wall thickening. Bibasilar linear opacities, likely atelectasis. Chronic prominent interstitium. No dense consolidation. No pneumothorax or large pleural effusion. Degenerative changes of the spine. No acute soft tissue abnormality. No free air under hemidiaphragms. Emphysema.      Impression: Emphysema. Bronchitis. Bibasilar atelectasis. No dense consolidation. Mild cardiomegaly. Enlarged pulmonary arteries. Probable cor pulmonale.    Images personally reviewed, interpreted and dictated by SHAMIKA Sanchez.       This report was signed and finalized on 3/16/2023 10:15 AM by SHAMIKA Sanchez.         THIEN Dudley is a 78 y.o. female who presents to the emergency department for evaluation of shortness of breath with exertion and reportedly low hemoglobin    Differential diagnosis includes anemia, GI bleeding, COPD exacerbation among other etiologies.    CBC, CMP, troponin, EKG, chest x-ray ordered for further evaluation of the patient's presentation.    Chart review if available included outside testing, previous visits, prior labs, prior imaging, available notes from prior evaluations or visits with specialists, medication list, allergies, past medical history, past surgical history when applicable.    Patient was treated with Solu-Medrol for wheezing and COPD  exacerbation    Patient's hemoglobin is 7.5 here however she has been chronically anemic with readings of 8 and 9 g of hemoglobin recently.  She reportedly has a history of nonobstructive CAD. States she has had some chronic tightness in her chest since January of this year and today is no different, no worse. She is on Eliquis, denies any known GI bleeding.  Discussed case labs and management with Dr. Norton no indication for emergent transfusion.  No indication for emergent endoscopy as she is hemodynamically stable at this time.  We will have her follow-up with her PCP regarding this anemia and return to the ER for any new or worsening symptoms.  Her and her daughter are comfortable with and understanding of the plan.  Patient does complain of sinus pain and pressure requesting an antibiotic for sinus infection as well as prednisone for her COPD.    Do note slight elevation in troponin of 4 points however patient denies any new or worsening chest pain or shortness of breath other than her chronic that she has had since January.  No ischemic changes on EKG.  Did discuss this value with Dr. Norton and again recommends discharge home and close outpatient follow-up.    Plan for disposition is discharged home.  Patient/family comfortable with and understanding of the plan.    I did call the patient's pharmacy to leave a message to cancel the prednisone as well has not worsened any GI bleeding.    Final diagnoses:   Anemia, unspecified type   COPD exacerbation (HCC)   Sinus pain        Donald Rowell PA-C  03/17/23 0147       Donald Rowell PA-C  03/17/23 0149

## 2023-03-20 ENCOUNTER — PATIENT OUTREACH (OUTPATIENT)
Dept: CASE MANAGEMENT | Facility: OTHER | Age: 78
End: 2023-03-20
Payer: MEDICARE

## 2023-03-20 DIAGNOSIS — J96.22 ACUTE ON CHRONIC RESPIRATORY FAILURE WITH HYPOXIA AND HYPERCAPNIA: ICD-10-CM

## 2023-03-20 DIAGNOSIS — J44.1 COPD EXACERBATION: Primary | ICD-10-CM

## 2023-03-20 DIAGNOSIS — J96.21 ACUTE ON CHRONIC RESPIRATORY FAILURE WITH HYPOXIA AND HYPERCAPNIA: ICD-10-CM

## 2023-03-20 NOTE — OUTREACH NOTE
AMBULATORY CASE MANAGEMENT NOTE    Name and Relationship of Patient/Support Person: Gabi Dudley - Self    Patient Outreach    AMB  outreach with Patient. Patient had an ED visit with [facility] on 03/16/23. Patient was treated and discharged to home with primary care and gastroentrology follow up as instructed. Medication changes at discharge include the addition of Fluconazole and Amoxicillin-Pot Clavulanate.    Patient states that she was up all night coughing and had just gone back to sleep. She verbalized not feeling well and having a dentist appointment at 2:30 this afternoon. Reports that stopped her Eliquis based on instruction from the ED and that her daughter is at work.    Reinforced ED instructions to not take prescribed steroids and to follow up with GI. Patient declined contact information for Gastro and declined assistance with scheduling an appointment.     Audible wheezes could be heard throughout call and Patient reported compliance with oxygen. Call was kept brief at Patient's request. Will attempt follow up at a later time to review benefits of CCM.    Soledad JACOBS  Ambulatory Case Management    3/20/2023, 10:13 EDT

## 2023-03-22 ENCOUNTER — HOSPITAL ENCOUNTER (EMERGENCY)
Facility: HOSPITAL | Age: 78
Discharge: HOME | End: 2023-03-22
Attending: EMERGENCY MEDICINE
Payer: MEDICARE

## 2023-03-22 ENCOUNTER — APPOINTMENT (OUTPATIENT)
Dept: CT IMAGING | Facility: HOSPITAL | Age: 78
End: 2023-03-22
Payer: MEDICARE

## 2023-03-22 VITALS
SYSTOLIC BLOOD PRESSURE: 135 MMHG | WEIGHT: 160 LBS | HEART RATE: 92 BPM | BODY MASS INDEX: 26.66 KG/M2 | RESPIRATION RATE: 16 BRPM | OXYGEN SATURATION: 99 % | TEMPERATURE: 98 F | DIASTOLIC BLOOD PRESSURE: 93 MMHG | HEIGHT: 65 IN

## 2023-03-22 DIAGNOSIS — D64.9 CHRONIC ANEMIA: ICD-10-CM

## 2023-03-22 DIAGNOSIS — K52.9 COLITIS: Primary | ICD-10-CM

## 2023-03-22 DIAGNOSIS — D64.89 OTHER SPECIFIED ANEMIAS: ICD-10-CM

## 2023-03-22 LAB
ALBUMIN SERPL-MCNC: 4 G/DL (ref 3.5–5.2)
ALBUMIN/GLOB SERPL: 1.3 G/DL
ALP SERPL-CCNC: 84 U/L (ref 39–117)
ALT SERPL W P-5'-P-CCNC: 10 U/L (ref 1–33)
ANION GAP SERPL CALCULATED.3IONS-SCNC: 12.6 MMOL/L (ref 5–15)
ANISOCYTOSIS BLD QL: NORMAL
AST SERPL-CCNC: 18 U/L (ref 1–32)
BACTERIA UR QL AUTO: ABNORMAL /HPF
BASOPHILS # BLD AUTO: 0.05 10*3/MM3 (ref 0–0.2)
BASOPHILS NFR BLD AUTO: 0.6 % (ref 0–1.5)
BILIRUB SERPL-MCNC: 0.2 MG/DL (ref 0–1.2)
BILIRUB UR QL STRIP: ABNORMAL
BUN SERPL-MCNC: 14 MG/DL (ref 8–23)
BUN/CREAT SERPL: 23.3 (ref 7–25)
CALCIUM SPEC-SCNC: 9.2 MG/DL (ref 8.6–10.5)
CHLORIDE SERPL-SCNC: 98 MMOL/L (ref 98–107)
CLARITY UR: ABNORMAL
CO2 SERPL-SCNC: 29.4 MMOL/L (ref 22–29)
COLOR UR: ABNORMAL
CREAT SERPL-MCNC: 0.6 MG/DL (ref 0.57–1)
DEPRECATED RDW RBC AUTO: 46 FL (ref 37–54)
EGFRCR SERPLBLD CKD-EPI 2021: 93.2 ML/MIN/1.73
EOSINOPHIL # BLD AUTO: 0.01 10*3/MM3 (ref 0–0.4)
EOSINOPHIL NFR BLD AUTO: 0.1 % (ref 0.3–6.2)
ERYTHROCYTE [DISTWIDTH] IN BLOOD BY AUTOMATED COUNT: 17.1 % (ref 12.3–15.4)
GLOBULIN UR ELPH-MCNC: 3.2 GM/DL
GLUCOSE SERPL-MCNC: 132 MG/DL (ref 65–99)
GLUCOSE UR STRIP-MCNC: NEGATIVE MG/DL
HCT VFR BLD AUTO: 35.7 % (ref 34–46.6)
HGB BLD-MCNC: 9.6 G/DL (ref 12–15.9)
HGB UR QL STRIP.AUTO: NEGATIVE
HOLD SPECIMEN: NORMAL
HOLD SPECIMEN: NORMAL
HYALINE CASTS UR QL AUTO: ABNORMAL /LPF
HYPOCHROMIA BLD QL: NORMAL
IMM GRANULOCYTES # BLD AUTO: 0.04 10*3/MM3 (ref 0–0.05)
IMM GRANULOCYTES NFR BLD AUTO: 0.5 % (ref 0–0.5)
KETONES UR QL STRIP: ABNORMAL
LEUKOCYTE ESTERASE UR QL STRIP.AUTO: ABNORMAL
LIPASE SERPL-CCNC: 27 U/L (ref 13–60)
LYMPHOCYTES # BLD AUTO: 0.97 10*3/MM3 (ref 0.7–3.1)
LYMPHOCYTES NFR BLD AUTO: 11.5 % (ref 19.6–45.3)
MCH RBC QN AUTO: 20.2 PG (ref 26.6–33)
MCHC RBC AUTO-ENTMCNC: 26.9 G/DL (ref 31.5–35.7)
MCV RBC AUTO: 75 FL (ref 79–97)
MONOCYTES # BLD AUTO: 0.47 10*3/MM3 (ref 0.1–0.9)
MONOCYTES NFR BLD AUTO: 5.6 % (ref 5–12)
MUCOUS THREADS URNS QL MICRO: ABNORMAL /HPF
NEUTROPHILS NFR BLD AUTO: 6.9 10*3/MM3 (ref 1.7–7)
NEUTROPHILS NFR BLD AUTO: 81.7 % (ref 42.7–76)
NITRITE UR QL STRIP: NEGATIVE
NRBC BLD AUTO-RTO: 0 /100 WBC (ref 0–0.2)
PH UR STRIP.AUTO: 6 [PH] (ref 5–8)
PLATELET # BLD AUTO: 304 10*3/MM3 (ref 140–450)
PMV BLD AUTO: 11.1 FL (ref 6–12)
POTASSIUM SERPL-SCNC: 4.3 MMOL/L (ref 3.5–5.2)
PROT SERPL-MCNC: 7.2 G/DL (ref 6–8.5)
PROT UR QL STRIP: ABNORMAL
RBC # BLD AUTO: 4.76 10*6/MM3 (ref 3.77–5.28)
RBC # UR STRIP: ABNORMAL /HPF
REF LAB TEST METHOD: ABNORMAL
SMALL PLATELETS BLD QL SMEAR: ADEQUATE
SODIUM SERPL-SCNC: 140 MMOL/L (ref 136–145)
SP GR UR STRIP: >=1.03 (ref 1–1.03)
SQUAMOUS #/AREA URNS HPF: ABNORMAL /HPF
UROBILINOGEN UR QL STRIP: ABNORMAL
WBC # UR STRIP: ABNORMAL /HPF
WBC MORPH BLD: NORMAL
WBC NRBC COR # BLD: 8.44 10*3/MM3 (ref 3.4–10.8)
WHOLE BLOOD HOLD COAG: NORMAL
WHOLE BLOOD HOLD SPECIMEN: NORMAL

## 2023-03-22 PROCEDURE — 83690 ASSAY OF LIPASE: CPT | Performed by: EMERGENCY MEDICINE

## 2023-03-22 PROCEDURE — 80053 COMPREHEN METABOLIC PANEL: CPT | Performed by: EMERGENCY MEDICINE

## 2023-03-22 PROCEDURE — 96361 HYDRATE IV INFUSION ADD-ON: CPT

## 2023-03-22 PROCEDURE — 74177 CT ABD & PELVIS W/CONTRAST: CPT

## 2023-03-22 PROCEDURE — 25010000002 ONDANSETRON PER 1 MG: Performed by: EMERGENCY MEDICINE

## 2023-03-22 PROCEDURE — 36415 COLL VENOUS BLD VENIPUNCTURE: CPT

## 2023-03-22 PROCEDURE — 96374 THER/PROPH/DIAG INJ IV PUSH: CPT

## 2023-03-22 PROCEDURE — 81001 URINALYSIS AUTO W/SCOPE: CPT | Performed by: EMERGENCY MEDICINE

## 2023-03-22 PROCEDURE — 85025 COMPLETE CBC W/AUTO DIFF WBC: CPT | Performed by: EMERGENCY MEDICINE

## 2023-03-22 PROCEDURE — 25510000001 IOPAMIDOL 61 % SOLUTION: Performed by: EMERGENCY MEDICINE

## 2023-03-22 PROCEDURE — 99284 EMERGENCY DEPT VISIT MOD MDM: CPT

## 2023-03-22 PROCEDURE — 85007 BL SMEAR W/DIFF WBC COUNT: CPT | Performed by: EMERGENCY MEDICINE

## 2023-03-22 RX ORDER — ONDANSETRON 2 MG/ML
4 INJECTION INTRAMUSCULAR; INTRAVENOUS ONCE
Status: COMPLETED | OUTPATIENT
Start: 2023-03-22 | End: 2023-03-22

## 2023-03-22 RX ORDER — SODIUM CHLORIDE 0.9 % (FLUSH) 0.9 %
10 SYRINGE (ML) INJECTION AS NEEDED
Status: DISCONTINUED | OUTPATIENT
Start: 2023-03-22 | End: 2023-03-23 | Stop reason: HOSPADM

## 2023-03-22 RX ORDER — ONDANSETRON 4 MG/1
4 TABLET, ORALLY DISINTEGRATING ORAL EVERY 8 HOURS PRN
Qty: 12 TABLET | Refills: 0 | Status: SHIPPED | OUTPATIENT
Start: 2023-03-22

## 2023-03-22 RX ORDER — PSEUDOEPHEDRINE HCL 30 MG
30 TABLET ORAL ONCE
Status: COMPLETED | OUTPATIENT
Start: 2023-03-22 | End: 2023-03-22

## 2023-03-22 RX ADMIN — IOPAMIDOL 100 ML: 612 INJECTION, SOLUTION INTRAVENOUS at 21:06

## 2023-03-22 RX ADMIN — PSEUDOEPHEDRINE HCL 30 MG: 30 TABLET, FILM COATED ORAL at 23:03

## 2023-03-22 RX ADMIN — SODIUM CHLORIDE 1000 ML: 9 INJECTION, SOLUTION INTRAVENOUS at 20:09

## 2023-03-22 RX ADMIN — ONDANSETRON 4 MG: 2 INJECTION INTRAMUSCULAR; INTRAVENOUS at 19:44

## 2023-03-23 NOTE — ED PROVIDER NOTES
Subjective  History of Present Illness:    Chief Complaint: Diarrhea  History of Present Illness: 78-year-old female presents with nausea and diarrhea, beginning yesterday.  No longer on anticoagulation as it was being held for dental procedure, she has been anemic, and PCP advised her to stop her anticoagulation, patient reports nonbloody watery diarrhea which was profuse yesterday and improved today.  Currently on Augmentin, has 5 more days on prescription.  Reports generalized weakness  Nurses Notes reviewed and agree, including vitals, allergies, social history and prior medical history.     REVIEW OF SYSTEMS: All systems reviewed and not pertinent unless noted.  Review of Systems      Positive for: Nausea diarrhea, generalized weakness    Negative for: Fever GI bleeding flank pain urinary symptoms    Past Medical History:   Diagnosis Date   • Adrenal adenoma    • Anemia    • Arrhythmia    • Asthma    • Atrial fibrillation (HCC)    • Back pain    • Benign colonic polyp    • Benign tumor of adrenal gland    • Cataract     bilateral   • Cholelithiasis    • Chronic bronchitis (HCC)    • Chronic bronchitis with COPD (chronic obstructive pulmonary disease) (HCC)    • COPD (chronic obstructive pulmonary disease) (HCC) 2005   • Coronary artery disease    • Depression    • Elevated cholesterol    • Environmental and seasonal allergies    • Fibromyalgia    • Gastritis    • Generalized anxiety disorder    • GERD (gastroesophageal reflux disease)    • H/O mammogram    • Hemorrhoids    • History of blood transfusion 1985   • History of blood transfusion 1985   • History of echocardiogram    • History of endometriosis    • History of nuclear stress test    • Hypertension    • IBS (irritable bowel syndrome)    • Impaired functional mobility, balance, gait, and endurance    • Impaired mobility    • Inverted nipple    • Kidney disease    • Kidney stone    • Liver cyst    • Nodular radiologic density    • Nodule of left lung     • On home oxygen therapy     2 liters NC QHS   • Osteoarthritis    • Osteoporosis    • PONV (postoperative nausea and vomiting)    • Renal cyst    • Sinus problem        • Sinusitis    • Skin cancer     basal cell carcinoma   • SOB (shortness of breath)    • Tobacco use    • Urinary frequency    • Vitamin D deficiency    • Wears glasses    • Wears partial dentures     upper plate       Allergies:    Doxycycline      Past Surgical History:   Procedure Laterality Date   • APPENDECTOMY     • CARDIAC CATHETERIZATION     • CATARACT EXTRACTION      both eyes   • CHOLECYSTECTOMY WITH INTRAOPERATIVE CHOLANGIOGRAM N/A 10/30/2020    Procedure: CHOLECYSTECTOMY LAPAROSCOPIC INTRAOPERATIVE CHOLANGIOGRAPHY;  Surgeon: Sima Moses MD;  Location: [facility] OR;  Service: General;  Laterality: N/A;   • COLONOSCOPY  2013   • COLONOSCOPY  2016   • COLONOSCOPY N/A 2019    Procedure: COLONOSCOPY W/ COLD FORCEP POLYPECTOMIES; HOT SNARE POLYPECTOMIES; COLD SNARE POLYPECTOMY;  Surgeon: Goyo Nunez MD;  Location: [facility] ENDOSCOPY;  Service: Gastroenterology   • COLONOSCOPY W/ BIOPSIES AND POLYPECTOMY     • EXPLORATORY LAPAROTOMY N/A 2020    Procedure: colectomy, right, closure of enterotomy x 2, reduction of internal volvulus;  Surgeon: Sima Moses MD;  Location: [facility] OR;  Service: General;  Laterality: N/A;   • HYSTERECTOMY      partial   • LYSIS OF ABDOMINAL ADHESIONS     • TONSILLECTOMY     • UPPER GASTROINTESTINAL ENDOSCOPY  2015   • VAGINAL DELIVERY      x2         Social History     Socioeconomic History   • Marital status:    Tobacco Use   • Smoking status: Former     Packs/day: 0.25     Years: 30.00     Pack years: 7.50     Types: Cigarettes     Quit date: 2020     Years since quittin.3     Passive exposure: Past   • Smokeless tobacco: Never   Vaping Use   • Vaping Use: Never used   Substance and Sexual Activity   • Alcohol use: No   • Drug use: No  "  • Sexual activity: Defer         Family History   Problem Relation Age of Onset   • Stomach cancer Brother    • Colon cancer Maternal Aunt    • Brain cancer Father    • Arthritis Father    • Hypertension Father    • Lung cancer Paternal Grandfather    • Breast cancer Neg Hx    • Ovarian cancer Neg Hx        Objective  Physical Exam:  /93   Pulse 92   Temp 98 °F (36.7 °C)   Resp 16   Ht 165.1 cm (65\")   Wt 72.6 kg (160 lb)   SpO2 99%   BMI 26.63 kg/m²      Physical Exam    CONSTITUTIONAL: Well developed, nontoxic 78-year-old female,  in no acute distress.  VITAL SIGNS: per nursing, reviewed and noted  SKIN: exposed skin with no rashes, ulcerations or petechiae  EYES: Grossly EOMI, no icterus  ENT: Normal voice.  Patient maintained wearing a mask throughout patient encounter due to coronavirus pandemic  RESPIRATORY:  No increased work of breathing. No retractions.   CARDIOVASCULAR:  regular rate and rhythm, no murmurs.  Good Peripheral pulses. Good cap refill to extremities.   GI: Abdomen soft, nonspecific diffuse tenderness without rebound tenderness or, normal bowel sounds. No hernia. No ascites.  MUSCULOSKELETAL: Age-appropriate bulk and tone, moves all 4 extremities  NEUROLOGIC: Alert, oriented x 3. No gross deficits. GCS 15.   PSYCH: appropriate affect.  : no bladder tenderness or distention, no CVA tenderness    Procedures    ED Course:      Lab Results (last 24 hours)     Procedure Component Value Units Date/Time    CBC & Differential [[id number]]  (Abnormal) Collected: 03/22/23 1940    Specimen: Blood Updated: 03/22/23 2001    Narrative:      The following orders were created for panel order CBC & Differential.  Procedure                               Abnormality         Status                     ---------                               -----------         ------                     CBC Auto Differential[[id number]]        Abnormal            Final result               Scan Slide[[id number]]      "                                  Final result                 Please view results for these tests on the individual orders.    Comprehensive Metabolic Panel [[id number]]  (Abnormal) Collected: 03/22/23 1940    Specimen: Blood Updated: 03/22/23 2007     Glucose 132 mg/dL      BUN 14 mg/dL      Creatinine 0.60 mg/dL      Sodium 140 mmol/L      Potassium 4.3 mmol/L      Chloride 98 mmol/L      CO2 29.4 mmol/L      Calcium 9.2 mg/dL      Total Protein 7.2 g/dL      Albumin 4.0 g/dL      ALT (SGPT) 10 U/L      AST (SGOT) 18 U/L      Alkaline Phosphatase 84 U/L      Total Bilirubin 0.2 mg/dL      Globulin 3.2 gm/dL      A/G Ratio 1.3 g/dL      BUN/Creatinine Ratio 23.3     Anion Gap 12.6 mmol/L      eGFR 93.2 mL/min/1.73     Narrative:      GFR Normal >60  Chronic Kidney Disease <60  Kidney Failure <15    The GFR formula is only valid for adults with stable renal function between ages 18 and 70.    Lipase [[id number]]  (Normal) Collected: 03/22/23 1940    Specimen: Blood Updated: 03/22/23 2007     Lipase 27 U/L     CBC Auto Differential [[id number]]  (Abnormal) Collected: 03/22/23 1940    Specimen: Blood Updated: 03/22/23 1949     WBC 8.44 10*3/mm3      RBC 4.76 10*6/mm3      Hemoglobin 9.6 g/dL      Hematocrit 35.7 %      MCV 75.0 fL      MCH 20.2 pg      MCHC 26.9 g/dL      RDW 17.1 %      RDW-SD 46.0 fl      MPV 11.1 fL      Platelets 304 10*3/mm3      Neutrophil % 81.7 %      Lymphocyte % 11.5 %      Monocyte % 5.6 %      Eosinophil % 0.1 %      Basophil % 0.6 %      Immature Grans % 0.5 %      Neutrophils, Absolute 6.90 10*3/mm3      Lymphocytes, Absolute 0.97 10*3/mm3      Monocytes, Absolute 0.47 10*3/mm3      Eosinophils, Absolute 0.01 10*3/mm3      Basophils, Absolute 0.05 10*3/mm3      Immature Grans, Absolute 0.04 10*3/mm3      nRBC 0.0 /100 WBC     Scan Slide [[id number]] Collected: 03/22/23 1940    Specimen: Blood Updated: 03/22/23 2001     Anisocytosis Slight/1+     Hypochromia Slight/1+     WBC Morphology  Normal     Platelet Estimate Adequate    Urinalysis With Microscopic If Indicated (No Culture) - Urine, Clean Catch [[id number]]  (Abnormal) Collected: 03/22/23 2023    Specimen: Urine, Clean Catch Updated: 03/22/23 2034     Color, UA Dark Yellow     Appearance, UA Cloudy     pH, UA 6.0     Specific Gravity, UA >=1.030     Glucose, UA Negative     Ketones, UA 15 mg/dL (1+)     Bilirubin, UA Small (1+)     Blood, UA Negative     Protein,  mg/dL (2+)     Leuk Esterase, UA Small (1+)     Nitrite, UA Negative     Urobilinogen, UA 1.0 E.U./dL    Urinalysis, Microscopic Only - Urine, Clean Catch [[id number]]  (Abnormal) Collected: 03/22/23 2023    Specimen: Urine, Clean Catch Updated: 03/22/23 2040     RBC, UA None Seen /HPF      WBC, UA 0-2 /HPF      Bacteria, UA None Seen /HPF      Squamous Epithelial Cells, UA None Seen /HPF      Hyaline Casts, UA None Seen /LPF      Mucus, UA Large/3+ /HPF      Methodology Manual Light Microscopy           No radiology results from the last 24 hrs       [facility]         Medical Decision Making:    Initial impression of presenting illness: 78-year-old female presents with nausea and diarrhea, beginning yesterday.  No longer on anticoagulation as it was being held for dental procedure, she has been anemic, and PCP advised her to stop her anticoagulation, patient reports nonbloody watery diarrhea which was profuse yesterday and improved today.  Currently on Augmentin,    DDX: includes but is not limited to: Colitis diverticulitis, viral gastroenteritis         Patient arrives normotensive afebrile no tachycardia oxygen saturations 99% with vitals interpreted by myself.     Pertinent features from physical exam: Mild diffuse abdominal tenderness without rebound tenderness or guarding, nontoxic.    Initial diagnostic plan: IV fluids basic labs, CT, stool studies antiemetic    Results from initial plan were reviewed and interpreted by me revealing patient unable to provide stool studies,  monitor emergency department 4+ hours, CT suggestive of mild colitis.  UA with mild ketones otherwise no acute finding.  Normal white cell count, hemoglobin 9.6 improved compared to previous, hematocrit 35.7, platelets 304, nonactionable CMP    Diagnostic information from other sources: Patient    Interventions / Re-evaluation: Feeling better after intervention    Results/clinical rationale were discussed with patient    Consultations/Discussion of results with other physicians: None    Disposition plan: Patient was discharged in home stable condition.  Counseled on supportive care, outpatient follow-up. Return precaution discussed.  Patient/family was understanding and agreeable with plan  Continue Augmentin, will add outpatient lab requisition for stool studies, GI referral for chronic anemia, colitis  -----      Final diagnoses:   Colitis   Chronic anemia   Other specified anemias        Bakari Ceja, DO  03/22/23 [id number]

## 2023-03-27 LAB
ADV 40+41 DNA STL QL NAA+NON-PROBE: NOT DETECTED
ASTRO TYP 1-8 RNA STL QL NAA+NON-PROBE: NOT DETECTED
C CAYETANENSIS DNA STL QL NAA+NON-PROBE: NOT DETECTED
C COLI+JEJ+UPSA DNA STL QL NAA+NON-PROBE: NOT DETECTED
C DIFF GDH + TOXINS A+B STL QL IA.RAPID: NEGATIVE
C DIFF GDH + TOXINS A+B STL QL IA.RAPID: NEGATIVE
CRYPTOSP DNA STL QL NAA+NON-PROBE: NOT DETECTED
E HISTOLYT DNA STL QL NAA+NON-PROBE: NOT DETECTED
EAEC PAA PLAS AGGR+AATA ST NAA+NON-PRB: NOT DETECTED
EC STX1+STX2 GENES STL QL NAA+NON-PROBE: NOT DETECTED
EPEC EAE GENE STL QL NAA+NON-PROBE: DETECTED
ETEC LTA+ST1A+ST1B TOX ST NAA+NON-PROBE: NOT DETECTED
G LAMBLIA DNA STL QL NAA+NON-PROBE: NOT DETECTED
NOROVIRUS GI+II RNA STL QL NAA+NON-PROBE: NOT DETECTED
P SHIGELLOIDES DNA STL QL NAA+NON-PROBE: NOT DETECTED
RVA RNA STL QL NAA+NON-PROBE: NOT DETECTED
S ENT+BONG DNA STL QL NAA+NON-PROBE: NOT DETECTED
SAPO I+II+IV+V RNA STL QL NAA+NON-PROBE: NOT DETECTED
SHIGELLA SP+EIEC IPAH ST NAA+NON-PROBE: NOT DETECTED
V CHOL+PARA+VUL DNA STL QL NAA+NON-PROBE: NOT DETECTED
V CHOLERAE DNA STL QL NAA+NON-PROBE: NOT DETECTED
Y ENTEROCOL DNA STL QL NAA+NON-PROBE: NOT DETECTED

## 2023-03-27 PROCEDURE — 87324 CLOSTRIDIUM AG IA: CPT | Performed by: EMERGENCY MEDICINE

## 2023-03-27 PROCEDURE — 87507 IADNA-DNA/RNA PROBE TQ 12-25: CPT | Performed by: EMERGENCY MEDICINE

## 2023-03-27 PROCEDURE — 87449 NOS EACH ORGANISM AG IA: CPT | Performed by: EMERGENCY MEDICINE

## 2023-03-28 ENCOUNTER — TELEPHONE (OUTPATIENT)
Dept: CASE MANAGEMENT | Facility: OTHER | Age: 78
End: 2023-03-28
Payer: MEDICARE

## 2023-03-28 NOTE — TELEPHONE ENCOUNTER
ANANT CM outreach with Patient. Left a message regarding information on the Chronic Care Management program and requested a return call to Angus Rowe Mgr at [phone].

## 2023-03-30 ENCOUNTER — TELEPHONE (OUTPATIENT)
Dept: CASE MANAGEMENT | Facility: OTHER | Age: 78
End: 2023-03-30
Payer: MEDICARE

## 2023-04-03 ENCOUNTER — TELEPHONE (OUTPATIENT)
Dept: CASE MANAGEMENT | Facility: OTHER | Age: 78
End: 2023-04-03

## 2023-04-03 ENCOUNTER — PATIENT OUTREACH (OUTPATIENT)
Dept: CASE MANAGEMENT | Facility: OTHER | Age: 78
End: 2023-04-03
Payer: MEDICARE

## 2023-04-03 DIAGNOSIS — J44.1 COPD EXACERBATION: Primary | ICD-10-CM

## 2023-04-03 DIAGNOSIS — F41.9 ANXIETY: Primary | ICD-10-CM

## 2023-04-03 DIAGNOSIS — F41.9 ANXIETY: ICD-10-CM

## 2023-04-03 NOTE — OUTREACH NOTE
AMBULATORY CASE MANAGEMENT NOTE    Name and Relationship of Patient/Support Person: JeffMalu - Emergency Contact    Patient Outreach    AMB  outreach with Patient. Daughter/Farida answered for Patient and reported that she was sleeping. Patient was identified for pro active outreach based on ED visit with [facility] on 03/16 and again on 03/22/23.     Dtr/Farida reports that she has rescheduled the missed appointments and that her mom is doing well and was able to complete her dental appointment where all her top teeth where pulled and she now has temporary dentures. Farida is planning on arranging private sitters with the Patient to assist while she is at work and she has a list of insurance approved providers. She requested a new referral to behavioral health for medication management related to Patient's anxiety.     Dtr/Farida has a strong understanding of Patient's medical status and expressed appreciation for outreach. Farida was agreeable to follow up call up in about a month. Reinforced contact information for [facility].    Care Coordination    Care coordination with Primary Care. Requested new referral for behavioral health because the last referral was closed before the Dtr/Patient were able to schedule an appointment.    Soledad JACOBS  Ambulatory Case Management    4/3/2023, 14:20 EDT

## 2023-04-19 ENCOUNTER — HOSPITAL ENCOUNTER (OUTPATIENT)
Dept: INFUSION THERAPY | Facility: HOSPITAL | Age: 78
Discharge: HOME | End: 2023-04-19
Admitting: INTERNAL MEDICINE
Payer: MEDICARE

## 2023-04-19 VITALS — RESPIRATION RATE: 18 BRPM | TEMPERATURE: 97.8 F

## 2023-04-19 DIAGNOSIS — M81.0 AGE-RELATED OSTEOPOROSIS WITHOUT CURRENT PATHOLOGICAL FRACTURE: Primary | ICD-10-CM

## 2023-04-19 PROCEDURE — 25010000002 DENOSUMAB 60 MG/ML SOLUTION PREFILLED SYRINGE: Performed by: INTERNAL MEDICINE

## 2023-04-19 PROCEDURE — 96372 THER/PROPH/DIAG INJ SC/IM: CPT

## 2023-04-19 RX ADMIN — DENOSUMAB 60 MG: 60 INJECTION SUBCUTANEOUS at 15:18

## 2023-04-25 ENCOUNTER — HOSPITAL ENCOUNTER (OUTPATIENT)
Dept: CT IMAGING | Facility: HOSPITAL | Age: 78
Discharge: HOME | End: 2023-04-25
Payer: MEDICARE

## 2023-04-25 ENCOUNTER — HOSPITAL ENCOUNTER (OUTPATIENT)
Dept: CARDIOLOGY | Facility: HOSPITAL | Age: 78
Discharge: HOME | End: 2023-04-25
Payer: MEDICARE

## 2023-04-25 DIAGNOSIS — Z87.891 PERSONAL HISTORY OF NICOTINE DEPENDENCE: ICD-10-CM

## 2023-04-25 DIAGNOSIS — R07.9 CHEST PAIN, UNSPECIFIED TYPE: ICD-10-CM

## 2023-04-25 DIAGNOSIS — Z12.2 ENCOUNTER FOR SCREENING FOR LUNG CANCER: ICD-10-CM

## 2023-04-25 LAB
MAXIMAL PREDICTED HEART RATE: 144 BPM
STRESS TARGET HR: 122 BPM

## 2023-04-25 PROCEDURE — 71271 CT THORAX LUNG CANCER SCR C-: CPT

## 2023-05-01 ENCOUNTER — TELEPHONE (OUTPATIENT)
Dept: CARDIOLOGY | Facility: CLINIC | Age: 78
End: 2023-05-01
Payer: MEDICARE

## 2023-05-01 ENCOUNTER — APPOINTMENT (OUTPATIENT)
Dept: GENERAL RADIOLOGY | Facility: HOSPITAL | Age: 78
DRG: 190 | End: 2023-05-01
Payer: MEDICARE

## 2023-05-01 ENCOUNTER — HOSPITAL ENCOUNTER (EMERGENCY)
Facility: HOSPITAL | Age: 78
Discharge: HOME | DRG: 190 | End: 2023-05-02
Attending: EMERGENCY MEDICINE | Admitting: EMERGENCY MEDICINE
Payer: MEDICARE

## 2023-05-01 ENCOUNTER — APPOINTMENT (OUTPATIENT)
Dept: CT IMAGING | Facility: HOSPITAL | Age: 78
DRG: 190 | End: 2023-05-01
Payer: MEDICARE

## 2023-05-01 DIAGNOSIS — I48.91 ATRIAL FIBRILLATION WITH RVR: Primary | ICD-10-CM

## 2023-05-01 LAB
ALBUMIN SERPL-MCNC: 3.8 G/DL (ref 3.5–5.2)
ALBUMIN/GLOB SERPL: 1.4 G/DL
ALP SERPL-CCNC: 91 U/L (ref 39–117)
ALT SERPL W P-5'-P-CCNC: 28 U/L (ref 1–33)
ANION GAP SERPL CALCULATED.3IONS-SCNC: 7.7 MMOL/L (ref 5–15)
ANISOCYTOSIS BLD QL: NORMAL
AST SERPL-CCNC: 35 U/L (ref 1–32)
BASOPHILS # BLD AUTO: 0.05 10*3/MM3 (ref 0–0.2)
BASOPHILS NFR BLD AUTO: 0.7 % (ref 0–1.5)
BILIRUB SERPL-MCNC: <0.2 MG/DL (ref 0–1.2)
BUN SERPL-MCNC: 19 MG/DL (ref 8–23)
BUN/CREAT SERPL: 27.5 (ref 7–25)
CALCIUM SPEC-SCNC: 8.8 MG/DL (ref 8.6–10.5)
CHLORIDE SERPL-SCNC: 101 MMOL/L (ref 98–107)
CO2 SERPL-SCNC: 34.3 MMOL/L (ref 22–29)
CREAT SERPL-MCNC: 0.69 MG/DL (ref 0.57–1)
DEPRECATED RDW RBC AUTO: 49.6 FL (ref 37–54)
EGFRCR SERPLBLD CKD-EPI 2021: 90.1 ML/MIN/1.73
EOSINOPHIL # BLD AUTO: 0.19 10*3/MM3 (ref 0–0.4)
EOSINOPHIL NFR BLD AUTO: 2.6 % (ref 0.3–6.2)
ERYTHROCYTE [DISTWIDTH] IN BLOOD BY AUTOMATED COUNT: 18.1 % (ref 12.3–15.4)
FLUAV RNA RESP QL NAA+PROBE: NOT DETECTED
FLUBV RNA RESP QL NAA+PROBE: NOT DETECTED
GLOBULIN UR ELPH-MCNC: 2.7 GM/DL
GLUCOSE SERPL-MCNC: 117 MG/DL (ref 65–99)
HCT VFR BLD AUTO: 29.2 % (ref 34–46.6)
HGB BLD-MCNC: 7.8 G/DL (ref 12–15.9)
HOLD SPECIMEN: NORMAL
HOLD SPECIMEN: NORMAL
HYPOCHROMIA BLD QL: NORMAL
IMM GRANULOCYTES # BLD AUTO: 0.02 10*3/MM3 (ref 0–0.05)
IMM GRANULOCYTES NFR BLD AUTO: 0.3 % (ref 0–0.5)
LYMPHOCYTES # BLD AUTO: 1.51 10*3/MM3 (ref 0.7–3.1)
LYMPHOCYTES NFR BLD AUTO: 20.7 % (ref 19.6–45.3)
MCH RBC QN AUTO: 20.5 PG (ref 26.6–33)
MCHC RBC AUTO-ENTMCNC: 26.7 G/DL (ref 31.5–35.7)
MCV RBC AUTO: 76.8 FL (ref 79–97)
MICROCYTES BLD QL: NORMAL
MONOCYTES # BLD AUTO: 0.52 10*3/MM3 (ref 0.1–0.9)
MONOCYTES NFR BLD AUTO: 7.1 % (ref 5–12)
NEUTROPHILS NFR BLD AUTO: 4.99 10*3/MM3 (ref 1.7–7)
NEUTROPHILS NFR BLD AUTO: 68.6 % (ref 42.7–76)
NRBC BLD AUTO-RTO: 0 /100 WBC (ref 0–0.2)
NT-PROBNP SERPL-MCNC: 3010 PG/ML (ref 0–1800)
PLATELET # BLD AUTO: 195 10*3/MM3 (ref 140–450)
PMV BLD AUTO: 9.7 FL (ref 6–12)
POTASSIUM SERPL-SCNC: 3.7 MMOL/L (ref 3.5–5.2)
PROT SERPL-MCNC: 6.5 G/DL (ref 6–8.5)
RBC # BLD AUTO: 3.8 10*6/MM3 (ref 3.77–5.28)
S PYO AG THROAT QL: NEGATIVE
SARS-COV-2 RNA RESP QL NAA+PROBE: NOT DETECTED
SMALL PLATELETS BLD QL SMEAR: ADEQUATE
SODIUM SERPL-SCNC: 143 MMOL/L (ref 136–145)
TROPONIN T SERPL HS-MCNC: 20 NG/L
TROPONIN T SERPL HS-MCNC: 22 NG/L
WBC MORPH BLD: NORMAL
WBC NRBC COR # BLD: 7.28 10*3/MM3 (ref 3.4–10.8)
WHOLE BLOOD HOLD COAG: NORMAL
WHOLE BLOOD HOLD SPECIMEN: NORMAL

## 2023-05-01 PROCEDURE — 85007 BL SMEAR W/DIFF WBC COUNT: CPT

## 2023-05-01 PROCEDURE — 96375 TX/PRO/DX INJ NEW DRUG ADDON: CPT

## 2023-05-01 PROCEDURE — 94640 AIRWAY INHALATION TREATMENT: CPT

## 2023-05-01 PROCEDURE — 96374 THER/PROPH/DIAG INJ IV PUSH: CPT

## 2023-05-01 PROCEDURE — 84484 ASSAY OF TROPONIN QUANT: CPT

## 2023-05-01 PROCEDURE — 87636 SARSCOV2 & INF A&B AMP PRB: CPT

## 2023-05-01 PROCEDURE — 80053 COMPREHEN METABOLIC PANEL: CPT | Performed by: EMERGENCY MEDICINE

## 2023-05-01 PROCEDURE — 71045 X-RAY EXAM CHEST 1 VIEW: CPT

## 2023-05-01 PROCEDURE — 25010000002 FUROSEMIDE PER 20 MG

## 2023-05-01 PROCEDURE — 99284 EMERGENCY DEPT VISIT MOD MDM: CPT

## 2023-05-01 PROCEDURE — 84484 ASSAY OF TROPONIN QUANT: CPT | Performed by: EMERGENCY MEDICINE

## 2023-05-01 PROCEDURE — 87880 STREP A ASSAY W/OPTIC: CPT

## 2023-05-01 PROCEDURE — 85025 COMPLETE CBC W/AUTO DIFF WBC: CPT

## 2023-05-01 PROCEDURE — 83880 ASSAY OF NATRIURETIC PEPTIDE: CPT | Performed by: EMERGENCY MEDICINE

## 2023-05-01 PROCEDURE — 71275 CT ANGIOGRAPHY CHEST: CPT

## 2023-05-01 PROCEDURE — 36415 COLL VENOUS BLD VENIPUNCTURE: CPT

## 2023-05-01 PROCEDURE — 93005 ELECTROCARDIOGRAM TRACING: CPT | Performed by: EMERGENCY MEDICINE

## 2023-05-01 PROCEDURE — 87081 CULTURE SCREEN ONLY: CPT

## 2023-05-01 PROCEDURE — 93005 ELECTROCARDIOGRAM TRACING: CPT

## 2023-05-01 PROCEDURE — 25010000002 METHYLPREDNISOLONE PER 125 MG

## 2023-05-01 PROCEDURE — 25510000001 IOPAMIDOL 61 % SOLUTION: Performed by: EMERGENCY MEDICINE

## 2023-05-01 RX ORDER — METHYLPREDNISOLONE SODIUM SUCCINATE 125 MG/2ML
125 INJECTION, POWDER, LYOPHILIZED, FOR SOLUTION INTRAMUSCULAR; INTRAVENOUS ONCE
Status: COMPLETED | OUTPATIENT
Start: 2023-05-01 | End: 2023-05-01

## 2023-05-01 RX ORDER — FUROSEMIDE 10 MG/ML
40 INJECTION INTRAMUSCULAR; INTRAVENOUS ONCE
Status: COMPLETED | OUTPATIENT
Start: 2023-05-01 | End: 2023-05-01

## 2023-05-01 RX ORDER — IPRATROPIUM BROMIDE AND ALBUTEROL SULFATE 2.5; .5 MG/3ML; MG/3ML
3 SOLUTION RESPIRATORY (INHALATION) ONCE
Status: COMPLETED | OUTPATIENT
Start: 2023-05-01 | End: 2023-05-01

## 2023-05-01 RX ORDER — DILTIAZEM HYDROCHLORIDE 5 MG/ML
20 INJECTION INTRAVENOUS ONCE
Status: COMPLETED | OUTPATIENT
Start: 2023-05-01 | End: 2023-05-01

## 2023-05-01 RX ORDER — SODIUM CHLORIDE 0.9 % (FLUSH) 0.9 %
10 SYRINGE (ML) INJECTION AS NEEDED
Status: DISCONTINUED | OUTPATIENT
Start: 2023-05-01 | End: 2023-05-02 | Stop reason: HOSPADM

## 2023-05-01 RX ADMIN — IPRATROPIUM BROMIDE AND ALBUTEROL SULFATE 3 ML: .5; 3 SOLUTION RESPIRATORY (INHALATION) at 22:29

## 2023-05-01 RX ADMIN — IOPAMIDOL 100 ML: 612 INJECTION, SOLUTION INTRAVENOUS at 23:30

## 2023-05-01 RX ADMIN — DILTIAZEM HYDROCHLORIDE 20 MG: 5 INJECTION, SOLUTION INTRAVENOUS at 22:18

## 2023-05-01 RX ADMIN — FUROSEMIDE 40 MG: 10 INJECTION, SOLUTION INTRAMUSCULAR; INTRAVENOUS at 21:38

## 2023-05-01 RX ADMIN — METHYLPREDNISOLONE SODIUM SUCCINATE 125 MG: 125 INJECTION, POWDER, FOR SOLUTION INTRAMUSCULAR; INTRAVENOUS at 21:37

## 2023-05-01 NOTE — TELEPHONE ENCOUNTER
Returned phone call to daughter. Informed her I will have samples at the  for . While on the phone, patients daughter states that pt is very claustrophobic and was prescribed klonopin by her PCP. She didn't take it during the first test because she was afraid it would effect the results. Patient still has this medication and would like to know if she can take it before her upcoming NM stress.

## 2023-05-01 NOTE — TELEPHONE ENCOUNTER
Dr. Curry says patient is okay to take Klonopin for her stress test.    If we have samples to give to the patient today, that is great.  But if they need long-term patient assistance, will you refer the family to the appropriate website or assist them?  I just do not know what to do when patients run out tomorrow and we have no samples here to offer them.

## 2023-05-01 NOTE — TELEPHONE ENCOUNTER
Caller: Malu Aguilar    Relationship: Emergency Contact    Best call back number: [phone]    What is the best time to reach you: ANY    What was the call regarding: ATTEMPTING TO GET SAMPLES OF ELIQUIS FOR HER MOTHER AIXA ERIC. SHE STATES HER MOTHER WILL BE OUT WHEN SHE TAKES HER LAST ONE TONIGHT.    Do you require a callback: YES

## 2023-05-02 VITALS
DIASTOLIC BLOOD PRESSURE: 82 MMHG | RESPIRATION RATE: 20 BRPM | HEART RATE: 88 BPM | WEIGHT: 160 LBS | BODY MASS INDEX: 26.66 KG/M2 | HEIGHT: 65 IN | OXYGEN SATURATION: 94 % | SYSTOLIC BLOOD PRESSURE: 149 MMHG | TEMPERATURE: 99.1 F

## 2023-05-02 RX ORDER — POTASSIUM CHLORIDE 750 MG/1
20 TABLET, FILM COATED, EXTENDED RELEASE ORAL DAILY
Qty: 6 TABLET | Refills: 0 | Status: SHIPPED | OUTPATIENT
Start: 2023-05-02 | End: 2023-05-05

## 2023-05-02 RX ORDER — DILTIAZEM HYDROCHLORIDE 120 MG/1
120 CAPSULE, COATED, EXTENDED RELEASE ORAL ONCE
Status: COMPLETED | OUTPATIENT
Start: 2023-05-02 | End: 2023-05-02

## 2023-05-02 RX ORDER — DILTIAZEM HYDROCHLORIDE 120 MG/1
240 CAPSULE, COATED, EXTENDED RELEASE ORAL DAILY
Qty: 30 CAPSULE | Refills: 1 | Status: SHIPPED | OUTPATIENT
Start: 2023-05-02 | End: 2023-05-27 | Stop reason: SDUPTHER

## 2023-05-02 RX ORDER — FUROSEMIDE 40 MG/1
40 TABLET ORAL DAILY
Qty: 3 TABLET | Refills: 0 | Status: SHIPPED | OUTPATIENT
Start: 2023-05-02 | End: 2023-05-22

## 2023-05-02 RX ADMIN — DILTIAZEM HYDROCHLORIDE 120 MG: 120 CAPSULE, COATED, EXTENDED RELEASE ORAL at 00:42

## 2023-05-02 NOTE — ED PROVIDER NOTES
Subjective  History of Present Illness:    Patient is a 78-year-old female here today with shortness of breath and swelling.  She states that she has been dealing with upper respiratory tract type symptoms for approximately 1 week.  Was seen by a local urgent treatment center on Friday and was given amoxicillin for her probable sinus infection due to her ear pressure, sinus drainage, and sore throat.  She reports having a history of COPD and asthma, wears 3 L nasal cannula at all times, but has been experiencing worsening shortness of breath than usual.  She also feels like her eyes have been puffy as well as her legs, noting that her socks have been feeling tight.  She denies having a history of chronic kidney disease or heart failure.  Does report having a history of atrial fibrillation and takes Eliquis.  No fever that she is aware of, she denies chest pain, nausea, vomiting, or diarrhea.    Nurses Notes reviewed and agree, including vitals, allergies, social history and prior medical history.     REVIEW OF SYSTEMS: All systems reviewed and not pertinent unless noted.  Review of Systems    Past Medical History:   Diagnosis Date   • Adrenal adenoma    • Anemia    • Arrhythmia    • Asthma    • Atrial fibrillation    • Back pain    • Benign colonic polyp    • Benign tumor of adrenal gland    • Cataract     bilateral   • Cholelithiasis    • Chronic bronchitis    • Chronic bronchitis with COPD (chronic obstructive pulmonary disease)    • COPD (chronic obstructive pulmonary disease) 2005   • Coronary artery disease    • Depression    • Elevated cholesterol    • Environmental and seasonal allergies    • Fibromyalgia    • Gastritis    • Generalized anxiety disorder    • GERD (gastroesophageal reflux disease)    • H/O mammogram    • Hemorrhoids    • History of blood transfusion 1985   • History of blood transfusion 1985   • History of echocardiogram    • History of endometriosis    • History of nuclear stress test    •  Hypertension    • IBS (irritable bowel syndrome)    • Impaired functional mobility, balance, gait, and endurance    • Impaired mobility    • Inverted nipple    • Kidney disease    • Kidney stone    • Liver cyst    • Nodular radiologic density    • Nodule of left lung    • On home oxygen therapy     2 liters NC QHS   • Osteoarthritis    • Osteoporosis    • PONV (postoperative nausea and vomiting)    • Renal cyst    • Sinus problem     2014   • Sinusitis    • Skin cancer     basal cell carcinoma   • SOB (shortness of breath)    • Tobacco use    • Urinary frequency    • Vitamin D deficiency    • Wears glasses    • Wears partial dentures     upper plate       Allergies:    Doxycycline      Past Surgical History:   Procedure Laterality Date   • APPENDECTOMY  1980s   • CARDIAC CATHETERIZATION  2003   • CATARACT EXTRACTION  2013    both eyes   • CHOLECYSTECTOMY WITH INTRAOPERATIVE CHOLANGIOGRAM N/A 10/30/2020    Procedure: CHOLECYSTECTOMY LAPAROSCOPIC INTRAOPERATIVE CHOLANGIOGRAPHY;  Surgeon: Sima Moses MD;  Location: [facility] OR;  Service: General;  Laterality: N/A;   • COLONOSCOPY  03/11/2013   • COLONOSCOPY  06/21/2016   • COLONOSCOPY N/A 7/24/2019    Procedure: COLONOSCOPY W/ COLD FORCEP POLYPECTOMIES; HOT SNARE POLYPECTOMIES; COLD SNARE POLYPECTOMY;  Surgeon: Goyo Nunez MD;  Location: [facility] ENDOSCOPY;  Service: Gastroenterology   • COLONOSCOPY W/ BIOPSIES AND POLYPECTOMY     • EXPLORATORY LAPAROTOMY N/A 11/23/2020    Procedure: colectomy, right, closure of enterotomy x 2, reduction of internal volvulus;  Surgeon: Sima Moses MD;  Location: [facility] OR;  Service: General;  Laterality: N/A;   • HYSTERECTOMY  1980s    partial   • LYSIS OF ABDOMINAL ADHESIONS     • TONSILLECTOMY  1987   • UPPER GASTROINTESTINAL ENDOSCOPY  01/13/2015   • VAGINAL DELIVERY      x2         Social History     Socioeconomic History   • Marital status:    Tobacco Use   • Smoking status: Former     Packs/day: 0.25     Years:  "30.00     Pack years: 7.50     Types: Cigarettes     Quit date: 2020     Years since quittin.4     Passive exposure: Past   • Smokeless tobacco: Never   Vaping Use   • Vaping Use: Never used   Substance and Sexual Activity   • Alcohol use: No   • Drug use: No   • Sexual activity: Defer         Family History   Problem Relation Age of Onset   • Stomach cancer Brother    • Colon cancer Maternal Aunt    • Brain cancer Father    • Arthritis Father    • Hypertension Father    • Lung cancer Paternal Grandfather    • Breast cancer Neg Hx    • Ovarian cancer Neg Hx        Objective  Physical Exam:  /82   Pulse 88   Temp 99.1 °F (37.3 °C) (Oral)   Resp 20   Ht 165.1 cm (65\")   Wt 72.6 kg (160 lb)   SpO2 94%   BMI 26.63 kg/m²      Physical Exam  Vitals and nursing note reviewed.   Constitutional:       Appearance: Normal appearance.   HENT:      Head: Normocephalic and atraumatic.      Right Ear: Ear canal and external ear normal. A middle ear effusion is present.      Left Ear: Ear canal and external ear normal. A middle ear effusion is present.      Nose: Nose normal.      Mouth/Throat:      Mouth: Mucous membranes are moist.      Pharynx: Oropharynx is clear. Uvula midline. Posterior oropharyngeal erythema present.   Eyes:      Extraocular Movements: Extraocular movements intact.      Conjunctiva/sclera: Conjunctivae normal.      Pupils: Pupils are equal, round, and reactive to light.   Neck:      Vascular: No carotid bruit.   Cardiovascular:      Rate and Rhythm: Normal rate and regular rhythm.      Pulses: Normal pulses.      Heart sounds: Normal heart sounds.   Pulmonary:      Effort: Pulmonary effort is normal.      Breath sounds: Decreased breath sounds and wheezing present.   Abdominal:      General: Abdomen is flat. Bowel sounds are normal. There is no distension.      Palpations: Abdomen is soft.      Tenderness: There is no abdominal tenderness.   Musculoskeletal:      Cervical back: Neck " supple. No tenderness.      Right lower leg: No edema.      Left lower leg: No edema.   Lymphadenopathy:      Cervical: No cervical adenopathy.   Skin:     General: Skin is warm and dry.      Capillary Refill: Capillary refill takes less than 2 seconds.   Neurological:      General: No focal deficit present.      Mental Status: She is alert and oriented to person, place, and time.   Psychiatric:         Mood and Affect: Mood normal.         Behavior: Behavior normal.         Procedures    ED Course:    ED Course as of 05/02/23 0116   Mon May 01, 2023   2003 EKG interpreted by me: Sinus tachycardia, PVC, some nonspecific ST/T changes throughout, this is an abnormal EKG [MP]      ED Course User Index  [MP] Alberto Cox MD       Lab Results (last 24 hours)     Procedure Component Value Units Date/Time    CBC & Differential [[id number]]  (Abnormal) Collected: 05/01/23 1959    Specimen: Blood Updated: 05/01/23 2019    Narrative:      The following orders were created for panel order CBC & Differential.  Procedure                               Abnormality         Status                     ---------                               -----------         ------                     CBC Auto Differential[[id number]]        Abnormal            Final result               Scan Slide[[id number]]                                       Final result                 Please view results for these tests on the individual orders.    Comprehensive Metabolic Panel [[id number]]  (Abnormal) Collected: 05/01/23 1959    Specimen: Blood Updated: 05/01/23 2037     Glucose 117 mg/dL      BUN 19 mg/dL      Creatinine 0.69 mg/dL      Sodium 143 mmol/L      Potassium 3.7 mmol/L      Chloride 101 mmol/L      CO2 34.3 mmol/L      Calcium 8.8 mg/dL      Total Protein 6.5 g/dL      Albumin 3.8 g/dL      ALT (SGPT) 28 U/L      AST (SGOT) 35 U/L      Alkaline Phosphatase 91 U/L      Total Bilirubin <0.2 mg/dL      Globulin 2.7 gm/dL      A/G Ratio 1.4  g/dL      BUN/Creatinine Ratio 27.5     Anion Gap 7.7 mmol/L      eGFR 90.1 mL/min/1.73     Narrative:      GFR Normal >60  Chronic Kidney Disease <60  Kidney Failure <15    The GFR formula is only valid for adults with stable renal function between ages 18 and 70.    BNP [[id number]]  (Abnormal) Collected: 05/01/23 1959    Specimen: Blood Updated: 05/01/23 2038     proBNP 3,010.0 pg/mL     Narrative:      Among patients with dyspnea, NT-proBNP is highly sensitive for the detection of acute congestive heart failure. In addition NT-proBNP of <300 pg/ml effectively rules out acute congestive heart failure with 99% negative predictive value.      Single High Sensitivity Troponin T [[id number]]  (Abnormal) Collected: 05/01/23 1959    Specimen: Blood Updated: 05/01/23 2034     HS Troponin T 20 ng/L     Narrative:      High Sensitive Troponin T Reference Range:  <10.0 ng/L- Negative Female for AMI  <15.0 ng/L- Negative Male for AMI  >=10 - Abnormal Female indicating possible myocardial injury.  >=15 - Abnormal Male indicating possible myocardial injury.   Clinicians would have to utilize clinical acumen, EKG, Troponin, and serial changes to determine if it is an Acute Myocardial Infarction or myocardial injury due to an underlying chronic condition.         CBC Auto Differential [[id number]]  (Abnormal) Collected: 05/01/23 1959    Specimen: Blood Updated: 05/01/23 2007     WBC 7.28 10*3/mm3      RBC 3.80 10*6/mm3      Hemoglobin 7.8 g/dL      Hematocrit 29.2 %      MCV 76.8 fL      MCH 20.5 pg      MCHC 26.7 g/dL      RDW 18.1 %      RDW-SD 49.6 fl      MPV 9.7 fL      Platelets 195 10*3/mm3      Neutrophil % 68.6 %      Lymphocyte % 20.7 %      Monocyte % 7.1 %      Eosinophil % 2.6 %      Basophil % 0.7 %      Immature Grans % 0.3 %      Neutrophils, Absolute 4.99 10*3/mm3      Lymphocytes, Absolute 1.51 10*3/mm3      Monocytes, Absolute 0.52 10*3/mm3      Eosinophils, Absolute 0.19 10*3/mm3      Basophils, Absolute 0.05  10*3/mm3      Immature Grans, Absolute 0.02 10*3/mm3      nRBC 0.0 /100 WBC     Scan Slide [[id number]] Collected: 05/01/23 1959    Specimen: Blood Updated: 05/01/23 2019     Anisocytosis Slight/1+     Hypochromia Slight/1+     Microcytes Slight/1+     WBC Morphology Normal     Platelet Estimate Adequate    COVID-19 and FLU A/B PCR - Swab, Nasopharynx [[id number]]  (Normal) Collected: 05/01/23 2137    Specimen: Swab from Nasopharynx Updated: 05/01/23 2211     COVID19 Not Detected     Influenza A PCR Not Detected     Influenza B PCR Not Detected    Narrative:      Fact sheet for providers: https://www.fda.gov/media/774165/download    Fact sheet for patients: https://www.fda.gov/media/162801/download    Test performed by PCR.    Rapid Strep A Screen - Swab, Throat [[id number]]  (Normal) Collected: 05/01/23 2137    Specimen: Swab from Throat Updated: 05/01/23 2157     Strep A Ag Negative    Beta Strep Culture, Throat - Swab, Throat [[id number]] Collected: 05/01/23 2137    Specimen: Swab from Throat Updated: 05/01/23 2157    Single High Sensitivity Troponin T [[id number]]  (Abnormal) Collected: 05/01/23 2307    Specimen: Blood Updated: 05/01/23 2332     HS Troponin T 22 ng/L     Narrative:      High Sensitive Troponin T Reference Range:  <10.0 ng/L- Negative Female for AMI  <15.0 ng/L- Negative Male for AMI  >=10 - Abnormal Female indicating possible myocardial injury.  >=15 - Abnormal Male indicating possible myocardial injury.   Clinicians would have to utilize clinical acumen, EKG, Troponin, and serial changes to determine if it is an Acute Myocardial Infarction or myocardial injury due to an underlying chronic condition.                CT Angiogram Chest Pulmonary Embolism    Result Date: 5/2/2023  FINAL REPORT TECHNIQUE: Multiple axial CT images were obtained through the chest following IV contrast using a CTA/PE protocol.  3D/MIP reconstruction images were also performed. This study was performed with techniques to  keep radiation doses as low as reasonably achievable (ALARA). Individualized dose reduction techniques using automated exposure control or adjustment of mA and/or kV according to the patient's size were employed. CLINICAL HISTORY: Pulmonary embolism (PE) suspected, unknown D-dimer FINDINGS: PAs and aorta: No pulmonary embolus.  Thoracic aorta is unremarkable.   There is coronary artery disease. Heart/mediastinum: No evidence for right heart strain.  No pericardial effusion.    There is cardiomegaly.  Lungs: There are severe emphysema.  There is an 11 mm noncalcified pulmonary nodule in the right lower lobe best visualized on image 171 of series 5.  Lymph nodes: No pathologically enlarged thoracic lymph nodes.  Pleura: No pneumothorax or pleural effusion. Chest Wall: No chest wall contusion.  Bones: No acute fracture. Upper abdomen: No acute findings in the upper abdomen.     Impression: No pulmonary embolus.   Severe emphysema.  Coronary artery disease and cardiomegaly.  11 mm noncalcified pulmonary nodule in the right lower lobe.  Follow-up with chest CT in 3 months according to the Fleischner Society criteria. Authenticated and Electronically Signed by Anjel Lee MD on 05/02/2023 12:25:37 AM         MDM    Initial impression of presenting illness: Shortness of breath    DDX: includes but is not limited to: Congestive heart failure, atrial fibrillation with RVR, other cardiac arrhythmia, volume overload, pneumonia, PE    Patient arrives hemodynamically stable, maintaining 94% on 3 L nasal cannula with vitals interpreted by myself.     Pertinent features from physical exam: Expiratory wheezing noted on exam, diminished breath sounds throughout.  Mild periorbital edema    Initial diagnostic plan: Chest x-ray, EKG, and basic labs including COVID/flu swab and strep swab.    Results from initial plan were reviewed and interpreted by me revealing nonactionable CMP.  Normal white blood count on her CBC is 7.28.  Mild  anemia with hemoglobin of 7.8 and hematocrit of 29.2.  Initial troponin of 22 and a BNP of 3000.  She is negative for strep, COVID, and flu.    Diagnostic information from other sources: Medical record    Interventions / Re-evaluation: Patient received Solu-Medrol, and a DuoNeb to help with her shortness of breath.  Patient had witnessed atrial fibrillation with rapid ventricular response during her visit.  She was given 20 mg of IV diltiazem which helped return her back to a sinus rhythm in the 70 to 90 bpm range.  She was then given an oral dose of diltiazem 120 mg.  Patient was also noted to have a significant desaturation in her O2 level when being assisted to the bedside commode.  She dropped into the 70s on her 3 L nasal cannula, however did recover once placed back in bed.  A CT of the chest was evaluated to rule out a PE as the patient reported that she was not taking her Eliquis as directed due to cost issues.    The CT was normal however there was evidence of a nodule that the radiologist recommended following up in the future.  After discussing the results with the patient and the daughter at the bedside, the patient was requesting to be discharged home.  She was given a short course of furosemide and potassium to help treat the mild edema she was experiencing, especially in her lower extremities.  Advised her to notify Dr. Curry tomorrow to schedule follow-up appointment.  Advised to return to the ER for any new or worsening symptoms.    Results/clinical rationale were discussed with Dr. Cox    Consultations/Discussion of results with other physicians: None    Disposition plan: Patient discharged home in stable condition.  -----    Final diagnoses:   Atrial fibrillation with RVR        Royce Wong, APRN  05/02/23 0116

## 2023-05-02 NOTE — ED NOTES
Assisted pt to bs commode. Pt sat 63% with oxygen on at 4LNC when ambulating. Returned pt back to bed and raised oxygen to 10LNC. Instructed pt to take deep breaths. Pt returned to normal 02 sat of 95%. Tapered pt back down to 4LNC. Pt 02 95% att. RT administering duoneb att. Will continue to monitor.

## 2023-05-02 NOTE — DISCHARGE INSTRUCTIONS
First dose of diltiazem provided in the ER,  the prescription and take as directed.  Use the furosemide and potassium for the next 3 days to help with decreasing your swelling.  Call Dr. Curry and make a follow-up appointment to discuss your atrial fibrillation.  Continue your Eliquis.

## 2023-05-03 LAB — BACTERIA SPEC AEROBE CULT: NORMAL

## 2023-05-05 ENCOUNTER — APPOINTMENT (OUTPATIENT)
Dept: GENERAL RADIOLOGY | Facility: HOSPITAL | Age: 78
DRG: 190 | End: 2023-05-05
Payer: MEDICARE

## 2023-05-05 ENCOUNTER — HOSPITAL ENCOUNTER (INPATIENT)
Facility: HOSPITAL | Age: 78
LOS: 6 days | Discharge: NURSING, REHAB OR LONG-TERM CARE | DRG: 190 | End: 2023-05-11
Attending: EMERGENCY MEDICINE
Payer: MEDICARE

## 2023-05-05 DIAGNOSIS — J96.21 ACUTE ON CHRONIC RESPIRATORY FAILURE WITH HYPOXIA AND HYPERCAPNIA: ICD-10-CM

## 2023-05-05 DIAGNOSIS — J44.1 COPD EXACERBATION: Primary | ICD-10-CM

## 2023-05-05 DIAGNOSIS — F41.9 ANXIETY: ICD-10-CM

## 2023-05-05 DIAGNOSIS — J96.22 ACUTE ON CHRONIC RESPIRATORY FAILURE WITH HYPOXIA AND HYPERCAPNIA: ICD-10-CM

## 2023-05-05 DIAGNOSIS — J96.21 ACUTE ON CHRONIC RESPIRATORY FAILURE WITH HYPOXIA: ICD-10-CM

## 2023-05-05 LAB
ALBUMIN SERPL-MCNC: 3.9 G/DL (ref 3.5–5.2)
ALBUMIN SERPL-MCNC: 4.1 G/DL (ref 3.5–5.2)
ALBUMIN/GLOB SERPL: 1.5 G/DL
ALBUMIN/GLOB SERPL: 1.6 G/DL
ALP SERPL-CCNC: 86 U/L (ref 39–117)
ALP SERPL-CCNC: 93 U/L (ref 39–117)
ALT SERPL W P-5'-P-CCNC: 19 U/L (ref 1–33)
ALT SERPL W P-5'-P-CCNC: 21 U/L (ref 1–33)
ANION GAP SERPL CALCULATED.3IONS-SCNC: 6.4 MMOL/L (ref 5–15)
ANION GAP SERPL CALCULATED.3IONS-SCNC: 8.9 MMOL/L (ref 5–15)
ANISOCYTOSIS BLD QL: NORMAL
AST SERPL-CCNC: 16 U/L (ref 1–32)
AST SERPL-CCNC: 19 U/L (ref 1–32)
ATMOSPHERIC PRESS: 735 MMHG
BASE EXCESS BLDV CALC-SCNC: 12.4 MMOL/L (ref 0–2)
BASOPHILS # BLD AUTO: 0.04 10*3/MM3 (ref 0–0.2)
BASOPHILS NFR BLD AUTO: 0.5 % (ref 0–1.5)
BDY SITE: ABNORMAL
BILIRUB SERPL-MCNC: <0.2 MG/DL (ref 0–1.2)
BILIRUB SERPL-MCNC: <0.2 MG/DL (ref 0–1.2)
BUN SERPL-MCNC: 21 MG/DL (ref 8–23)
BUN SERPL-MCNC: 22 MG/DL (ref 8–23)
BUN/CREAT SERPL: 28.4 (ref 7–25)
BUN/CREAT SERPL: 36.7 (ref 7–25)
CALCIUM SPEC-SCNC: 8.4 MG/DL (ref 8.6–10.5)
CALCIUM SPEC-SCNC: 8.9 MG/DL (ref 8.6–10.5)
CHLORIDE SERPL-SCNC: 96 MMOL/L (ref 98–107)
CHLORIDE SERPL-SCNC: 99 MMOL/L (ref 98–107)
CO2 SERPL-SCNC: 33.1 MMOL/L (ref 22–29)
CO2 SERPL-SCNC: 36.6 MMOL/L (ref 22–29)
COHGB MFR BLD: 1.3 % (ref 0–5)
CREAT SERPL-MCNC: 0.6 MG/DL (ref 0.57–1)
CREAT SERPL-MCNC: 0.74 MG/DL (ref 0.57–1)
D-LACTATE SERPL-SCNC: 2 MMOL/L (ref 0.5–2)
DEPRECATED RDW RBC AUTO: 51.1 FL (ref 37–54)
DEPRECATED RDW RBC AUTO: 51.5 FL (ref 37–54)
EGFRCR SERPLBLD CKD-EPI 2021: 84 ML/MIN/1.73
EGFRCR SERPLBLD CKD-EPI 2021: 93.2 ML/MIN/1.73
EOSINOPHIL # BLD AUTO: 0.14 10*3/MM3 (ref 0–0.4)
EOSINOPHIL NFR BLD AUTO: 1.6 % (ref 0.3–6.2)
ERYTHROCYTE [DISTWIDTH] IN BLOOD BY AUTOMATED COUNT: 18 % (ref 12.3–15.4)
ERYTHROCYTE [DISTWIDTH] IN BLOOD BY AUTOMATED COUNT: 18.3 % (ref 12.3–15.4)
FERRITIN SERPL-MCNC: 7.99 NG/ML (ref 13–150)
GEN 5 2HR TROPONIN T REFLEX: 17 NG/L
GLOBULIN UR ELPH-MCNC: 2.5 GM/DL
GLOBULIN UR ELPH-MCNC: 2.7 GM/DL
GLUCOSE SERPL-MCNC: 155 MG/DL (ref 65–99)
GLUCOSE SERPL-MCNC: 174 MG/DL (ref 65–99)
HCO3 BLDV-SCNC: 39.8 MMOL/L (ref 22–28)
HCT VFR BLD AUTO: 27.8 % (ref 34–46.6)
HCT VFR BLD AUTO: 28.5 % (ref 34–46.6)
HGB BLD-MCNC: 7.3 G/DL (ref 12–15.9)
HGB BLD-MCNC: 7.7 G/DL (ref 12–15.9)
HOLD SPECIMEN: NORMAL
HOLD SPECIMEN: NORMAL
HYPOCHROMIA BLD QL: NORMAL
IMM GRANULOCYTES # BLD AUTO: 0.04 10*3/MM3 (ref 0–0.05)
IMM GRANULOCYTES NFR BLD AUTO: 0.5 % (ref 0–0.5)
IRON 24H UR-MRATE: 11 MCG/DL (ref 37–145)
IRON SATN MFR SERPL: 2 % (ref 20–50)
LYMPHOCYTES # BLD AUTO: 1.58 10*3/MM3 (ref 0.7–3.1)
LYMPHOCYTES NFR BLD AUTO: 18.5 % (ref 19.6–45.3)
Lab: ABNORMAL
MCH RBC QN AUTO: 20.6 PG (ref 26.6–33)
MCH RBC QN AUTO: 20.8 PG (ref 26.6–33)
MCHC RBC AUTO-ENTMCNC: 26.3 G/DL (ref 31.5–35.7)
MCHC RBC AUTO-ENTMCNC: 27 G/DL (ref 31.5–35.7)
MCV RBC AUTO: 76.8 FL (ref 79–97)
MCV RBC AUTO: 78.3 FL (ref 79–97)
METHGB BLD QL: 1 % (ref 0–3)
MODALITY: ABNORMAL
MONOCYTES # BLD AUTO: 0.67 10*3/MM3 (ref 0.1–0.9)
MONOCYTES NFR BLD AUTO: 7.8 % (ref 5–12)
NEUTROPHILS NFR BLD AUTO: 6.07 10*3/MM3 (ref 1.7–7)
NEUTROPHILS NFR BLD AUTO: 71.1 % (ref 42.7–76)
NOTE: ABNORMAL
NRBC BLD AUTO-RTO: 0 /100 WBC (ref 0–0.2)
OXYHGB MFR BLDV: 56.4 % (ref 40–70)
PCO2 BLDV: 73.9 MM HG (ref 40–50)
PH BLDV: 7.34 PH UNITS (ref 7.32–7.42)
PLATELET # BLD AUTO: 245 10*3/MM3 (ref 140–450)
PLATELET # BLD AUTO: 272 10*3/MM3 (ref 140–450)
PMV BLD AUTO: 10.1 FL (ref 6–12)
PMV BLD AUTO: 10.7 FL (ref 6–12)
PO2 BLDV: 31.4 MM HG (ref 30–50)
POIKILOCYTOSIS BLD QL SMEAR: NORMAL
POTASSIUM SERPL-SCNC: 4 MMOL/L (ref 3.5–5.2)
POTASSIUM SERPL-SCNC: 4.2 MMOL/L (ref 3.5–5.2)
PROCALCITONIN SERPL-MCNC: 0.06 NG/ML (ref 0–0.25)
PROT SERPL-MCNC: 6.4 G/DL (ref 6–8.5)
PROT SERPL-MCNC: 6.8 G/DL (ref 6–8.5)
RBC # BLD AUTO: 3.55 10*6/MM3 (ref 3.77–5.28)
RBC # BLD AUTO: 3.71 10*6/MM3 (ref 3.77–5.28)
SAO2 % BLDCOV: 57.7 % (ref 45–75)
SMALL PLATELETS BLD QL SMEAR: ADEQUATE
SODIUM SERPL-SCNC: 138 MMOL/L (ref 136–145)
SODIUM SERPL-SCNC: 142 MMOL/L (ref 136–145)
TIBC SERPL-MCNC: 523 MCG/DL (ref 298–536)
TRANSFERRIN SERPL-MCNC: 351 MG/DL (ref 200–360)
TROPONIN T DELTA: -3 NG/L
TROPONIN T SERPL HS-MCNC: 20 NG/L
VENTILATOR MODE: ABNORMAL
WBC MORPH BLD: NORMAL
WBC NRBC COR # BLD: 8.54 10*3/MM3 (ref 3.4–10.8)
WBC NRBC COR # BLD: 8.8 10*3/MM3 (ref 3.4–10.8)
WHOLE BLOOD HOLD COAG: NORMAL
WHOLE BLOOD HOLD SPECIMEN: NORMAL

## 2023-05-05 PROCEDURE — 25010000002 METHYLPREDNISOLONE PER 125 MG: Performed by: EMERGENCY MEDICINE

## 2023-05-05 PROCEDURE — 94640 AIRWAY INHALATION TREATMENT: CPT

## 2023-05-05 PROCEDURE — 36415 COLL VENOUS BLD VENIPUNCTURE: CPT

## 2023-05-05 PROCEDURE — 82805 BLOOD GASES W/O2 SATURATION: CPT

## 2023-05-05 PROCEDURE — 83605 ASSAY OF LACTIC ACID: CPT | Performed by: EMERGENCY MEDICINE

## 2023-05-05 PROCEDURE — 83540 ASSAY OF IRON: CPT | Performed by: INTERNAL MEDICINE

## 2023-05-05 PROCEDURE — 82820 HEMOGLOBIN-OXYGEN AFFINITY: CPT

## 2023-05-05 PROCEDURE — 71045 X-RAY EXAM CHEST 1 VIEW: CPT

## 2023-05-05 PROCEDURE — 84484 ASSAY OF TROPONIN QUANT: CPT | Performed by: EMERGENCY MEDICINE

## 2023-05-05 PROCEDURE — 84466 ASSAY OF TRANSFERRIN: CPT | Performed by: INTERNAL MEDICINE

## 2023-05-05 PROCEDURE — 25010000002 MORPHINE PER 10 MG: Performed by: EMERGENCY MEDICINE

## 2023-05-05 PROCEDURE — 94799 UNLISTED PULMONARY SVC/PX: CPT

## 2023-05-05 PROCEDURE — 82728 ASSAY OF FERRITIN: CPT | Performed by: INTERNAL MEDICINE

## 2023-05-05 PROCEDURE — 85025 COMPLETE CBC W/AUTO DIFF WBC: CPT | Performed by: EMERGENCY MEDICINE

## 2023-05-05 PROCEDURE — 99223 1ST HOSP IP/OBS HIGH 75: CPT | Performed by: INTERNAL MEDICINE

## 2023-05-05 PROCEDURE — 63710000001 PREDNISONE PER 1 MG: Performed by: INTERNAL MEDICINE

## 2023-05-05 PROCEDURE — 25010000002 AZITHROMYCIN PER 500 MG: Performed by: INTERNAL MEDICINE

## 2023-05-05 PROCEDURE — 94664 DEMO&/EVAL PT USE INHALER: CPT

## 2023-05-05 PROCEDURE — 80053 COMPREHEN METABOLIC PANEL: CPT | Performed by: INTERNAL MEDICINE

## 2023-05-05 PROCEDURE — 93005 ELECTROCARDIOGRAM TRACING: CPT | Performed by: EMERGENCY MEDICINE

## 2023-05-05 PROCEDURE — 94761 N-INVAS EAR/PLS OXIMETRY MLT: CPT

## 2023-05-05 PROCEDURE — 99285 EMERGENCY DEPT VISIT HI MDM: CPT

## 2023-05-05 PROCEDURE — 85027 COMPLETE CBC AUTOMATED: CPT | Performed by: INTERNAL MEDICINE

## 2023-05-05 PROCEDURE — 80053 COMPREHEN METABOLIC PANEL: CPT | Performed by: EMERGENCY MEDICINE

## 2023-05-05 PROCEDURE — 85007 BL SMEAR W/DIFF WBC COUNT: CPT | Performed by: EMERGENCY MEDICINE

## 2023-05-05 PROCEDURE — 84145 PROCALCITONIN (PCT): CPT | Performed by: EMERGENCY MEDICINE

## 2023-05-05 PROCEDURE — 25010000002 NA FERRIC GLUC CPLX PER 12.5 MG: Performed by: FAMILY MEDICINE

## 2023-05-05 RX ORDER — CALCIUM CARBONATE 500 MG/1
2 TABLET, CHEWABLE ORAL 2 TIMES DAILY PRN
Status: DISCONTINUED | OUTPATIENT
Start: 2023-05-05 | End: 2023-05-11 | Stop reason: HOSPADM

## 2023-05-05 RX ORDER — FERROUS SULFATE TAB EC 324 MG (65 MG FE EQUIVALENT) 324 (65 FE) MG
324 TABLET DELAYED RESPONSE ORAL
Status: DISCONTINUED | OUTPATIENT
Start: 2023-05-05 | End: 2023-05-11 | Stop reason: HOSPADM

## 2023-05-05 RX ORDER — SODIUM CHLORIDE 0.9 % (FLUSH) 0.9 %
10 SYRINGE (ML) INJECTION AS NEEDED
Status: DISCONTINUED | OUTPATIENT
Start: 2023-05-05 | End: 2023-05-11 | Stop reason: HOSPADM

## 2023-05-05 RX ORDER — DILTIAZEM HYDROCHLORIDE 240 MG/1
240 CAPSULE, COATED, EXTENDED RELEASE ORAL DAILY
Status: DISCONTINUED | OUTPATIENT
Start: 2023-05-05 | End: 2023-05-11 | Stop reason: HOSPADM

## 2023-05-05 RX ORDER — SODIUM CHLORIDE 9 MG/ML
40 INJECTION, SOLUTION INTRAVENOUS AS NEEDED
Status: DISCONTINUED | OUTPATIENT
Start: 2023-05-05 | End: 2023-05-11 | Stop reason: HOSPADM

## 2023-05-05 RX ORDER — OXYCODONE AND ACETAMINOPHEN 7.5; 325 MG/1; MG/1
1 TABLET ORAL EVERY 4 HOURS PRN
Status: DISCONTINUED | OUTPATIENT
Start: 2023-05-05 | End: 2023-05-11 | Stop reason: HOSPADM

## 2023-05-05 RX ORDER — PANTOPRAZOLE SODIUM 40 MG/1
40 TABLET, DELAYED RELEASE ORAL DAILY
Status: DISCONTINUED | OUTPATIENT
Start: 2023-05-05 | End: 2023-05-11 | Stop reason: HOSPADM

## 2023-05-05 RX ORDER — ONDANSETRON 2 MG/ML
4 INJECTION INTRAMUSCULAR; INTRAVENOUS EVERY 6 HOURS PRN
Status: DISCONTINUED | OUTPATIENT
Start: 2023-05-05 | End: 2023-05-11 | Stop reason: HOSPADM

## 2023-05-05 RX ORDER — CLOTRIMAZOLE AND BETAMETHASONE DIPROPIONATE 10; .64 MG/G; MG/G
CREAM TOPICAL 2 TIMES DAILY
Status: DISCONTINUED | OUTPATIENT
Start: 2023-05-05 | End: 2023-05-11 | Stop reason: HOSPADM

## 2023-05-05 RX ORDER — IPRATROPIUM BROMIDE AND ALBUTEROL SULFATE 2.5; .5 MG/3ML; MG/3ML
3 SOLUTION RESPIRATORY (INHALATION) ONCE
Status: COMPLETED | OUTPATIENT
Start: 2023-05-05 | End: 2023-05-05

## 2023-05-05 RX ORDER — GUAIFENESIN/DEXTROMETHORPHAN 100-10MG/5
10 SYRUP ORAL EVERY 6 HOURS PRN
Status: DISCONTINUED | OUTPATIENT
Start: 2023-05-05 | End: 2023-05-11 | Stop reason: HOSPADM

## 2023-05-05 RX ORDER — BENZONATATE 100 MG/1
100 CAPSULE ORAL 3 TIMES DAILY PRN
Status: DISCONTINUED | OUTPATIENT
Start: 2023-05-05 | End: 2023-05-11 | Stop reason: HOSPADM

## 2023-05-05 RX ORDER — CHOLECALCIFEROL (VITAMIN D3) 125 MCG
5 CAPSULE ORAL NIGHTLY
Status: DISCONTINUED | OUTPATIENT
Start: 2023-05-05 | End: 2023-05-11 | Stop reason: HOSPADM

## 2023-05-05 RX ORDER — IPRATROPIUM BROMIDE AND ALBUTEROL SULFATE 2.5; .5 MG/3ML; MG/3ML
3 SOLUTION RESPIRATORY (INHALATION) EVERY 4 HOURS PRN
Status: DISCONTINUED | OUTPATIENT
Start: 2023-05-05 | End: 2023-05-11 | Stop reason: HOSPADM

## 2023-05-05 RX ORDER — ATORVASTATIN CALCIUM 20 MG/1
20 TABLET, FILM COATED ORAL DAILY
Status: DISCONTINUED | OUTPATIENT
Start: 2023-05-05 | End: 2023-05-11 | Stop reason: HOSPADM

## 2023-05-05 RX ORDER — METHYLPREDNISOLONE SODIUM SUCCINATE 125 MG/2ML
125 INJECTION, POWDER, LYOPHILIZED, FOR SOLUTION INTRAMUSCULAR; INTRAVENOUS ONCE
Status: COMPLETED | OUTPATIENT
Start: 2023-05-05 | End: 2023-05-05

## 2023-05-05 RX ORDER — CETIRIZINE HYDROCHLORIDE 10 MG/1
10 TABLET ORAL DAILY
Status: DISCONTINUED | OUTPATIENT
Start: 2023-05-05 | End: 2023-05-11 | Stop reason: HOSPADM

## 2023-05-05 RX ORDER — ASPIRIN 325 MG
325 TABLET ORAL ONCE
Status: COMPLETED | OUTPATIENT
Start: 2023-05-05 | End: 2023-05-05

## 2023-05-05 RX ORDER — PREDNISONE 20 MG/1
40 TABLET ORAL
Status: DISCONTINUED | OUTPATIENT
Start: 2023-05-05 | End: 2023-05-08

## 2023-05-05 RX ORDER — FLUTICASONE PROPIONATE 50 MCG
2 SPRAY, SUSPENSION (ML) NASAL DAILY
Status: DISCONTINUED | OUTPATIENT
Start: 2023-05-05 | End: 2023-05-11 | Stop reason: HOSPADM

## 2023-05-05 RX ORDER — ALBUTEROL SULFATE 90 UG/1
2 AEROSOL, METERED RESPIRATORY (INHALATION) EVERY 4 HOURS PRN
Status: DISCONTINUED | OUTPATIENT
Start: 2023-05-05 | End: 2023-05-11 | Stop reason: HOSPADM

## 2023-05-05 RX ORDER — ACETAMINOPHEN 325 MG/1
650 TABLET ORAL EVERY 4 HOURS PRN
Status: DISCONTINUED | OUTPATIENT
Start: 2023-05-05 | End: 2023-05-11 | Stop reason: HOSPADM

## 2023-05-05 RX ORDER — BUDESONIDE AND FORMOTEROL FUMARATE DIHYDRATE 160; 4.5 UG/1; UG/1
2 AEROSOL RESPIRATORY (INHALATION)
Status: DISCONTINUED | OUTPATIENT
Start: 2023-05-05 | End: 2023-05-11 | Stop reason: HOSPADM

## 2023-05-05 RX ORDER — BISACODYL 5 MG/1
5 TABLET, DELAYED RELEASE ORAL DAILY PRN
Status: DISCONTINUED | OUTPATIENT
Start: 2023-05-05 | End: 2023-05-11 | Stop reason: HOSPADM

## 2023-05-05 RX ORDER — POLYETHYLENE GLYCOL 3350 17 G/17G
17 POWDER, FOR SOLUTION ORAL DAILY PRN
Status: DISCONTINUED | OUTPATIENT
Start: 2023-05-05 | End: 2023-05-11 | Stop reason: HOSPADM

## 2023-05-05 RX ORDER — ONDANSETRON 4 MG/1
4 TABLET, FILM COATED ORAL EVERY 6 HOURS PRN
Status: DISCONTINUED | OUTPATIENT
Start: 2023-05-05 | End: 2023-05-11 | Stop reason: HOSPADM

## 2023-05-05 RX ORDER — TRAZODONE HYDROCHLORIDE 50 MG/1
100 TABLET ORAL NIGHTLY PRN
Status: DISCONTINUED | OUTPATIENT
Start: 2023-05-05 | End: 2023-05-11 | Stop reason: HOSPADM

## 2023-05-05 RX ORDER — DULOXETIN HYDROCHLORIDE 30 MG/1
60 CAPSULE, DELAYED RELEASE ORAL DAILY
Status: DISCONTINUED | OUTPATIENT
Start: 2023-05-05 | End: 2023-05-11 | Stop reason: HOSPADM

## 2023-05-05 RX ORDER — BISACODYL 10 MG
10 SUPPOSITORY, RECTAL RECTAL DAILY PRN
Status: DISCONTINUED | OUTPATIENT
Start: 2023-05-05 | End: 2023-05-11 | Stop reason: HOSPADM

## 2023-05-05 RX ORDER — CLONAZEPAM 0.5 MG/1
1 TABLET ORAL DAILY PRN
Status: DISCONTINUED | OUTPATIENT
Start: 2023-05-05 | End: 2023-05-11 | Stop reason: HOSPADM

## 2023-05-05 RX ORDER — AMOXICILLIN 250 MG
2 CAPSULE ORAL 2 TIMES DAILY
Status: DISCONTINUED | OUTPATIENT
Start: 2023-05-05 | End: 2023-05-11 | Stop reason: HOSPADM

## 2023-05-05 RX ORDER — SODIUM CHLORIDE 0.9 % (FLUSH) 0.9 %
10 SYRINGE (ML) INJECTION EVERY 12 HOURS SCHEDULED
Status: DISCONTINUED | OUTPATIENT
Start: 2023-05-05 | End: 2023-05-11 | Stop reason: HOSPADM

## 2023-05-05 RX ORDER — MORPHINE SULFATE 2 MG/ML
2 INJECTION, SOLUTION INTRAMUSCULAR; INTRAVENOUS ONCE
Status: COMPLETED | OUTPATIENT
Start: 2023-05-05 | End: 2023-05-05

## 2023-05-05 RX ORDER — ONDANSETRON 4 MG/1
4 TABLET, FILM COATED ORAL EVERY 8 HOURS PRN
Status: DISCONTINUED | OUTPATIENT
Start: 2023-05-05 | End: 2023-05-11 | Stop reason: HOSPADM

## 2023-05-05 RX ADMIN — Medication 10 ML: at 08:30

## 2023-05-05 RX ADMIN — BUDESONIDE AND FORMOTEROL FUMARATE DIHYDRATE 2 PUFF: 160; 4.5 AEROSOL RESPIRATORY (INHALATION) at 19:49

## 2023-05-05 RX ADMIN — METHYLPREDNISOLONE SODIUM SUCCINATE 125 MG: 125 INJECTION, POWDER, FOR SOLUTION INTRAMUSCULAR; INTRAVENOUS at 01:29

## 2023-05-05 RX ADMIN — PREDNISONE 40 MG: 20 TABLET ORAL at 08:30

## 2023-05-05 RX ADMIN — FLUTICASONE PROPIONATE 2 SPRAY: 50 SPRAY, METERED NASAL at 08:31

## 2023-05-05 RX ADMIN — ATORVASTATIN CALCIUM 20 MG: 20 TABLET, FILM COATED ORAL at 08:29

## 2023-05-05 RX ADMIN — CLOTRIMAZOLE AND BETAMETHASONE DIPROPIONATE: 10; .5 CREAM TOPICAL at 08:31

## 2023-05-05 RX ADMIN — BENZONATATE 100 MG: 100 CAPSULE ORAL at 18:40

## 2023-05-05 RX ADMIN — APIXABAN 5 MG: 5 TABLET, FILM COATED ORAL at 05:51

## 2023-05-05 RX ADMIN — SODIUM CHLORIDE 500 MG: 900 INJECTION, SOLUTION INTRAVENOUS at 05:48

## 2023-05-05 RX ADMIN — MORPHINE SULFATE 2 MG: 2 INJECTION, SOLUTION INTRAMUSCULAR; INTRAVENOUS at 01:25

## 2023-05-05 RX ADMIN — APIXABAN 5 MG: 5 TABLET, FILM COATED ORAL at 19:05

## 2023-05-05 RX ADMIN — IPRATROPIUM BROMIDE AND ALBUTEROL SULFATE 3 ML: .5; 3 SOLUTION RESPIRATORY (INHALATION) at 19:55

## 2023-05-05 RX ADMIN — CLOTRIMAZOLE AND BETAMETHASONE DIPROPIONATE: 10; .5 CREAM TOPICAL at 20:04

## 2023-05-05 RX ADMIN — OXYCODONE HYDROCHLORIDE AND ACETAMINOPHEN 1 TABLET: 7.5; 325 TABLET ORAL at 16:11

## 2023-05-05 RX ADMIN — ASPIRIN 325 MG ORAL TABLET 325 MG: 325 PILL ORAL at 01:25

## 2023-05-05 RX ADMIN — BENZONATATE 100 MG: 100 CAPSULE ORAL at 06:02

## 2023-05-05 RX ADMIN — OXYCODONE HYDROCHLORIDE AND ACETAMINOPHEN 1 TABLET: 7.5; 325 TABLET ORAL at 20:03

## 2023-05-05 RX ADMIN — Medication 5 MG: at 20:02

## 2023-05-05 RX ADMIN — CETIRIZINE HYDROCHLORIDE 10 MG: 10 TABLET, FILM COATED ORAL at 08:29

## 2023-05-05 RX ADMIN — IPRATROPIUM BROMIDE AND ALBUTEROL SULFATE 3 ML: .5; 3 SOLUTION RESPIRATORY (INHALATION) at 01:50

## 2023-05-05 RX ADMIN — DULOXETINE 60 MG: 30 CAPSULE, DELAYED RELEASE ORAL at 08:29

## 2023-05-05 RX ADMIN — TRAZODONE HYDROCHLORIDE 100 MG: 50 TABLET ORAL at 20:02

## 2023-05-05 RX ADMIN — TIOTROPIUM BROMIDE INHALATION SPRAY 2 PUFF: 3.12 SPRAY, METERED RESPIRATORY (INHALATION) at 07:23

## 2023-05-05 RX ADMIN — DILTIAZEM HYDROCHLORIDE 240 MG: 240 CAPSULE, COATED, EXTENDED RELEASE ORAL at 08:29

## 2023-05-05 RX ADMIN — SENNOSIDES AND DOCUSATE SODIUM 2 TABLET: 50; 8.6 TABLET ORAL at 20:02

## 2023-05-05 RX ADMIN — SODIUM CHLORIDE 125 MG: 900 INJECTION INTRAVENOUS at 14:08

## 2023-05-05 RX ADMIN — SENNOSIDES AND DOCUSATE SODIUM 2 TABLET: 50; 8.6 TABLET ORAL at 08:29

## 2023-05-05 RX ADMIN — PANTOPRAZOLE SODIUM 40 MG: 40 TABLET, DELAYED RELEASE ORAL at 08:29

## 2023-05-05 RX ADMIN — FERROUS SULFATE TAB EC 324 MG (65 MG FE EQUIVALENT) 324 MG: 324 (65 FE) TABLET DELAYED RESPONSE at 08:29

## 2023-05-05 RX ADMIN — CLONAZEPAM 1 MG: 0.5 TABLET ORAL at 06:02

## 2023-05-05 RX ADMIN — SERTRALINE 150 MG: 50 TABLET, FILM COATED ORAL at 08:29

## 2023-05-05 RX ADMIN — BUDESONIDE AND FORMOTEROL FUMARATE DIHYDRATE 2 PUFF: 160; 4.5 AEROSOL RESPIRATORY (INHALATION) at 07:23

## 2023-05-05 RX ADMIN — Medication 10 ML: at 20:04

## 2023-05-05 RX ADMIN — Medication 5000 UNITS: at 08:29

## 2023-05-05 NOTE — CASE MANAGEMENT/SOCIAL WORK
Discharge Planning Assessment   Jean     Patient Name: Gabi Dudley  MRN: [id number]  Today's Date: 5/5/2023    Admit Date: 5/5/2023    Plan: Pt plans to dc to STR. Referrals will be sent, however pt declined therapy today and facility admissions are closed on weekends. Cm will f/u on mon. Pt has [employer] though unknown company she does not use consistently and o2 with [employer].   Discharge Needs Assessment     Row Name 05/05/23 1448       Living Environment    People in Home child(ashely), adult    Name(s) of People in Home Malu Aguilar/dtr    Current Living Arrangements home    Duration at Residence 13 yrs.    Potentially Unsafe Housing Conditions unable to assess    Primary Care Provided by other (see comments)  dtr    Provides Primary Care For no one, unable/limited ability to care for self    Family Caregiver if Needed child(ashely), adult    Family Caregiver Names pt reports she needs caregiver available to assist while her dtr is at work    Quality of Family Relationships helpful;involved    Able to Return to Prior Arrangements no  wants str    Living Arrangement Comments lives with dtrMalu       Resource/Environmental Concerns    Resource/Environmental Concerns none    Transportation Concerns none       Food Insecurity    Within the past 12 months, you worried that your food would run out before you got the money to buy more. Never true    Within the past 12 months, the food you bought just didn't last and you didn't have money to get more. Never true       Transition Planning    Patient/Family Anticipates Transition to inpatient rehabilitation facility    Patient/Family Anticipated Services at Transition skilled nursing    Transportation Anticipated family or friend will provide       Discharge Needs Assessment    Readmission Within the Last 30 Days no previous admission in last 30 days    Equipment Currently Used at Home commode;nebulizer;oxygen;noninvasive ventilator;pulse ox;respiratory  supplies;shower chair;rollator    Concerns to be Addressed discharge planning    Concerns Comments wants STR. Dtr reports she has list of personal caregivers and needs to call them.    Anticipated Changes Related to Illness inability to care for self    Equipment Needed After Discharge none    Discharge Facility/Level of Care Needs nursing facility, skilled    Provided Post Acute Provider List? Yes    Post Acute Provider List Inpatient Rehab    Provided Post Acute Provider Quality & Resource List? Yes    Post Acute Provider Quality and Resource List Inpatient Rehab    Delivered To Patient;Support Person    Support Person Malu Aguilar    Method of Delivery Telephone    Current Discharge Risk chronically ill;cognitively impaired;dependent with mobility/activities of daily living;physical impairment               Discharge Plan     Row Name [phone]       Plan    Plan Pt plans to dc to STR. Referrals will be sent, however pt declined therapy today and facility admissions are closed on weekends. Cm will f/u on mon. Pt has [employer] though unknown company she does not use consistnetly and o2 with [employer].    Plan Comments Cm talked with pt at bedside to assess DC needs. Pt appears to have difficulty understanding/processing CM questions. She was eventually able to confirm she needs help with ADLs, like toileting,  while her dtr is at work. She expresses desire to goo to STR at dc. CM agrees to send referrals and emphasized the importance of her working with therapy in order for insurance to appprove STR. Cm spoke with dtr, Malu Aguilar over the phone to discuss dcp. She confirms pt name,  address. Pt has lives with Malu for 13 yrs and dependent on her for bathing, dressing, med reminder, transport, meal prep, shopping. PCP is Dr Quinteros and she [facility]. No LW or POA. Pt and dtr agreeable to STR. Facility options int county discussed. Dtr states she has a copy of prersonal caregivers she plans to call  also. DME includes [employer] (unknown company), O2/[employer], shower chair, nebulizer, pulse ox, rollator she uses always, and [facility]. Cm to send STR referrals. Rehab notes will need sent mon.              Continued Care and Services - Admitted Since 5/5/2023    Coordination has not been started for this encounter.     Selected Continued Care - Episodes Includes continued care and service providers with selected services from the active episodes listed below    High Risk Care Management Episode start date: 4/3/2023   There are no active outsourced providers for this episode.               Expected Discharge Date and Time     Expected Discharge Date Expected Discharge Time    May 8, 2023          Demographic Summary     Row Name 05/05/23 1443       General Information    Admission Type inpatient    Arrived From emergency department    Referral Source admission list    Reason for Consult discharge planning    Preferred Language English       Contact Information    Permission Granted to Share Info With family/designee    Contact Information Obtained for     Contact Information Comments Malu Aguilar-dtr-[phone]               Functional Status     Row Name 05/05/23 1444       Functional Status    Usual Activity Tolerance fair    Current Activity Tolerance fair    Functional Status Comments dependent on dtr for assist with bathing, dressing, med reminders, meal prep, transportation, shopping       Physical Activity    On average, how many days per week do you engage in moderate to strenuous exercise (like a brisk walk)? 0 days    On average, how many minutes do you engage in exercise at this level? 0 min    Number of minutes of exercise per week 0       Assessment of Health Literacy    How often do you have someone help you read hospital materials? Sometimes    How often do you have problems learning about your medical condition because of difficulty understanding written information? Sometimes    How often do  you have a problem understanding what is told to you about your medical condition? Sometimes    How confident are you filling out medical forms by yourself? Somewhat    Health Literacy Fair       Functional Status, IADL    Medications assistive person    Meal Preparation assistive person    Housekeeping completely dependent    Laundry completely dependent    Shopping completely dependent       Mental Status Summary    Mental Status Comments pt demonstrates delayed cognition/thought processes. has difficulty with fluent communication when question asked. Requires extended time to process question and offer response       Employment/    Current or Previous Occupation not applicable               Psychosocial     Row Name 05/05/23 1448       Developmental Stage (Eriksson's)    Developmental Stage Stage 8 (67 years-death/Late Adulthood) Integrity vs. Despair               Abuse/Neglect    No documentation.                Legal    No documentation.                Substance Abuse    No documentation.                Patient Forms    No documentation.                   Malu Horan, APRN

## 2023-05-05 NOTE — THERAPY EVALUATION
Patient declined OT eval d/t no sleep. Nursing reports patient has been up and down to [facility] several times. Will follow up at later date.

## 2023-05-05 NOTE — PROGRESS NOTES
"Adult Nutrition  Assessment/PES    Patient Name:  Gabi Dudley  YOB: 1945  MRN: [id number]  Admit Date:  5/5/2023    Assessment Date:  5/5/2023    Comments:    Pt w/ good PO intake at breakfast time, eating 100% of meal. No nutrition-related concerns at this time. Continue to encourage PO intake to meet 50% of estimated needs. RD to follow-up and available PRN.      Reason for Assessment     Row Name 05/05/23 1539          Reason for Assessment    Reason For Assessment per organizational policy     Diagnosis pulmonary disease                  Labs/Tests/Procedures/Meds     Row Name 05/05/23 1539          Labs/Procedures/Meds    Lab Results Reviewed reviewed, pertinent        Medications    Pertinent Medications Reviewed reviewed, pertinent     Pertinent Medications Comments ferrous sulfate                Physical Findings     Row Name 05/05/23 1540          Physical Findings    Overall Physical Appearance normal wt for age                Estimated/Assessed Needs - Anthropometrics     Row Name 05/05/23 1542          Anthropometrics    Height for Calculation 1.651 m (5' 5\")     Weight for Calculation 73 kg (161 lb)        Estimated/Assessed Needs    Additional Documentation Estimated Calorie Needs (Group);KCAL/KG (Group);Protein Requirements (Group);Fluid Requirements (Group)        Estimated Calorie Needs    Estimated Calorie Requirement (kcal/day) 2000        KCAL/KG    KCAL/KG 25 Kcal/Kg (kcal);30 Kcal/Kg (kcal)     25 Kcal/Kg (kcal) 1825.725     30 Kcal/Kg (kcal) 2190.87        Protein Requirements    Weight Used For Protein Calculations 73 kg (161 lb)     Est Protein Requirement Amount (gms/kg) 1.2 gm protein     Estimated Protein Requirements (gms/day) 87.63        Fluid Requirements    Fluid Requirements (mL/day) 2000     RDA Method (mL) 2000                Nutrition Prescription Ordered     Row Name 05/05/23 1540          Nutrition Prescription PO    Current PO Diet Regular     Fluid " Consistency Thin                Evaluation of Received Nutrient/Fluid Intake     Row Name 05/05/23 1540          Intake Assessment    Energy/Calorie Requirement Assessment meeting needs     Protein Requirement Assessment meeting needs        PO Evaluation    Number of Days PO Intake Evaluated 1 day     Number of Meals 1     % PO Intake 100                   Problem/Interventions:   Problem 1     Row Name 05/05/23 1540          Nutrition Diagnoses Problem 1    Problem 1 No Nutrition Diagnosis at this Time                      Intervention Goal     Row Name 05/05/23 1540          Intervention Goal    General Maintain nutrition;Improved nutrition related lab(s);Reduce/improve symptoms;Meet nutritional needs for age/condition;Disease management/therapy     PO Establish PO;Tolerate PO;Increase intake;Maintain intake;Continue positive trend;Meet estimated needs     Weight Maintain weight                Nutrition Intervention     Row Name 05/05/23 1541          Nutrition Intervention    RD/Tech Action Follow Tx progress;Care plan reviewd;Await begin PO;Encourage intake                Nutrition Prescription     Row Name 05/05/23 1541          Nutrition Prescription PO    New PO Prescription Ordered? No, recommended        Other Orders    Obtain Weight Daily     Obtain Weight Ordered? No, recommended     Supplement Vitamin mineral supplement     Supplement Ordered? No, recommended     Labs Mg++;Na+;K+     Labs Ordered? No, recommended                Education/Evaluation     Row Name 05/05/23 1541          Education    Education Will Instruct as appropriate        Monitor/Evaluation    Monitor Per protocol;PO intake;Pertinent labs;Weight;Skin status;Symptoms                 Electronically signed by:  Sergey Arredondo RD  05/05/23 15:43 EDT

## 2023-05-05 NOTE — H&P
[facility]   HISTORY AND PHYSICAL      Name:  Gabi Dudley   Age:  78 y.o.  Sex:  female  :  1945  MRN:  [id number]   Visit Number:  [id number]  Admission Date:  2023  Date Of Service:  23  Primary Care Physician:  Paul Quinteros MD    Chief Complaint:     Shortness of air    History Of Presenting Illness:      78-year-old lady with COPD and chronic respiratory failure on home oxygen 3 to 4 L presents following an episode of chest tightness.  She reports a gradually progressive decline in her ability to perform her activities of daily living without dyspnea.  She reports that with any activity at all even doing the laundry or walking around the house she is becoming short of breath.  She reports that she was recently treated with some antibiotics and steroids for this without much improvement.  The episode she had today occurred with some mild activity which caused her dyspnea tachypnea and then chest tightness which radiated all over her chest and both of her arms and both of her jaws.  Of note they had attempted to do a stress test on her she reports 2 weeks ago but she was unable to complete it due to anxiety.  She also had pain in her ears during the episode.  By the time I am seeing her in the emergency department after receiving steroids and nebulizer treatments her breathing is back near her baseline where it has been for the last few weeks which is quite poor.  She continues to desaturate in the emergency department just going to the bedside commode.  She is fearful about going home and about her inability to do the things that she needs to at home and the lack of help that she has at home.  She elects to be full code.    Pertinent findings: pH 7.34, PCO2 73.9, troponin 20 and then 17, hemoglobin 7.7, MCV 76.8, chest x-ray shows an interstitial prominence which is unchanged since her film from May 1, 2023.  EKG shows sinus rhythm with non-specific T wave  changes P mitrale    ED Medications:    Medications   sodium chloride 0.9 % flush 10 mL (has no administration in time range)   aspirin tablet 325 mg (325 mg Oral Given 5/5/23 0125)   methylPREDNISolone sodium succinate (SOLU-Medrol) injection 125 mg (125 mg Intravenous Given 5/5/23 0129)   ipratropium-albuterol (DUO-NEB) nebulizer solution 3 mL (3 mL Nebulization Given 5/5/23 0150)   Morphine sulfate (PF) injection 2 mg (2 mg Intravenous Given 5/5/23 0125)       Edited by: Milton Urban DO at 5/5/2023 0515     Review Of Systems:    All systems were reviewed and negative except as mentioned in history of presenting illness, assessment and plan.    Past Medical History: Patient  has a past medical history of Adrenal adenoma, Anemia, Arrhythmia, Asthma, Atrial fibrillation, Back pain, Benign colonic polyp, Benign tumor of adrenal gland, Cataract, Cholelithiasis, Chronic bronchitis, Chronic bronchitis with COPD (chronic obstructive pulmonary disease), COPD (chronic obstructive pulmonary disease) (2005), Coronary artery disease, Depression, Elevated cholesterol, Environmental and seasonal allergies, Fibromyalgia, Gastritis, Generalized anxiety disorder, GERD (gastroesophageal reflux disease), H/O mammogram, Hemorrhoids, History of blood transfusion (1985), History of blood transfusion (1985), History of echocardiogram, History of endometriosis, History of nuclear stress test, Hypertension, IBS (irritable bowel syndrome), Impaired functional mobility, balance, gait, and endurance, Impaired mobility, Inverted nipple, Kidney disease, Kidney stone, Liver cyst, Nodular radiologic density, Nodule of left lung, On home oxygen therapy, Osteoarthritis, Osteoporosis, PONV (postoperative nausea and vomiting), Renal cyst, Sinus problem, Sinusitis, Skin cancer, SOB (shortness of breath), Tobacco use, Urinary frequency, Vitamin D deficiency, Wears glasses, and Wears partial dentures.    Past Surgical History: Patient  has a  past surgical history that includes Appendectomy (1980s); Tonsillectomy (1987); Colonoscopy w/ biopsies and polypectomy; Colonoscopy (03/11/2013); Colonoscopy (06/21/2016); Upper gastrointestinal endoscopy (01/13/2015); Vaginal delivery; Colonoscopy (N/A, 7/24/2019); Cataract extraction (2013); Hysterectomy (1980s); Cardiac catheterization (2003); Abdominal adhesion surgery; cholecystectomy with intraoperative cholangiogram (N/A, 10/30/2020); and Exploratory Laparotomy (N/A, 11/23/2020).    Social History: Patient  reports that she quit smoking about 2 years ago. Her smoking use included cigarettes. She has a 7.50 pack-year smoking history. She has been exposed to tobacco smoke. She has never used smokeless tobacco. She reports that she does not drink alcohol and does not use drugs.    Family History:  Patient's family history has been reviewed and found to be noncontributory.     Allergies:      Doxycycline    Home Medications:    Prior to Admission Medications     Prescriptions Last Dose Informant Patient Reported? Taking?    albuterol sulfate  (90 Base) MCG/ACT inhaler   No No    INHALE 2 PUFFS BY MOUTH EVERY FOUR HOURS AS NEEDED FOR WHEEZING    apixaban (Eliquis) 5 MG tablet tablet   No No    Take 1 tablet by mouth Every 12 (Twelve) Hours.    atorvastatin (LIPITOR) 20 MG tablet   No No    TAKE 1 TABLET BY MOUTH DAILY    benzonatate (Tessalon Perles) 100 MG capsule   No No    Take 1 capsule by mouth 3 (Three) Times a Day As Needed for Cough.    Budeson-Glycopyrrol-Formoterol (Breztri Aerosphere) 160-9-4.8 MCG/ACT aerosol inhaler   No No    Inhale 2 puffs 2 (Two) Times a Day. Rinse mouth out after use    cetirizine (zyrTEC) 10 MG tablet   No No    Take 1 tablet by mouth Daily.    Cholecalciferol (vitamin D3) 125 MCG (5000 UT) capsule capsule  Self Yes No    Take 1 capsule by mouth Daily.    clonazePAM (KlonoPIN) 1 MG tablet   No No    Take 1 tablet by mouth Daily As Needed for Anxiety.     clotrimazole-betamethasone (Lotrisone) 1-0.05 % cream   No No    Apply  topically to the appropriate area as directed 2 (Two) Times a Day.    dilTIAZem CD (CARDIZEM CD) 120 MG 24 hr capsule   No No    Take 2 capsules by mouth Daily.    DULoxetine (CYMBALTA) 60 MG capsule   No No    Take 1 capsule by mouth 2 (Two) Times a Day.    Patient taking differently:  Take 1 capsule by mouth Daily.    fluconazole (DIFLUCAN) 150 MG tablet   Yes No    fluticasone (FLONASE) 50 MCG/ACT nasal spray  Self, Child Yes No    2 sprays into the nostril(s) as directed by provider Daily.    furosemide (LASIX) 40 MG tablet   No No    Take 1 tablet by mouth Daily for 3 days.    ipratropium-albuterol (DUO-NEB) 0.5-2.5 mg/3 ml nebulizer   No No    USE 3 mL VIA NEBULIZER EVERY 4 HOURS AS NEEDED FOR WHEEZING    methocarbamol (Robaxin) 500 MG tablet   No No    1 qid po prn    O2 (OXYGEN)   Yes No    Inhale 2 L/min As Needed.    ondansetron (ZOFRAN) 4 MG tablet   No No    Take 1 tablet by mouth Every 8 (Eight) Hours As Needed for Nausea or Vomiting.    ondansetron ODT (ZOFRAN-ODT) 4 MG disintegrating tablet   No No    Place 1 tablet on the tongue Every 8 (Eight) Hours As Needed for Nausea.    oxyCODONE-acetaminophen (PERCOCET) 7.5-325 MG per tablet   Yes No    Take 1 tablet by mouth Every 4 (Four) Hours As Needed.    pantoprazole (PROTONIX) 40 MG EC tablet   No No    TAKE 1 TABLET BY MOUTH DAILY    potassium chloride 10 MEQ CR tablet   No No    Take 2 tablets by mouth Daily for 3 days.    sertraline (ZOLOFT) 100 MG tablet   No No    TAKE 1 & 1/2 TABLET BY MOUTH DAILY    traZODone (DESYREL) 100 MG tablet   No No    1 qhs po prn            Vital Signs:  Temp:  [98 °F (36.7 °C)] 98 °F (36.7 °C)  Heart Rate:  [77-89] 82  Resp:  [18-22] 22  BP: (114-174)/(42-75) 114/74        05/05/23  0057   Weight: 72.6 kg (160 lb)     Body mass index is 26.63 kg/m².    Physical Exam:     Most recent vital Signs: /74   Pulse 82   Temp 98 °F (36.7 °C)  "(Axillary)   Resp 22   Ht 165.1 cm (65\")   Wt 72.6 kg (160 lb)   SpO2 97%   BMI 26.63 kg/m²     Constitutional: Awake, alert  Eyes: PERRLA, sclerae anicteric, no conjunctival injection  HENT: NCAT, mucous membranes moist  Neck: Supple, no thyromegaly, no lymphadenopathy, trachea midline  Respiratory: Minimal audible breath sounds bilaterally, nonlabored respirations   Cardiovascular: RRR, no murmurs, rubs, or gallops, palpable pedal pulses bilaterally  Gastrointestinal: Positive bowel sounds, soft, nontender, nondistended  Musculoskeletal: No bilateral ankle edema, no clubbing or cyanosis to extremities  Psychiatric: Appropriate affect, cooperative  Neurologic: Oriented x 3, speech clear  Skin: No rashes  Edited by: Milton Urban DO at 5/5/2023 6216      Laboratory data:    I have reviewed the labs done in the emergency room.    Results from last 7 days   Lab Units 05/05/23  0100 05/01/23 1959   SODIUM mmol/L 142 143   POTASSIUM mmol/L 4.0 3.7   CHLORIDE mmol/L 99 101   CO2 mmol/L 36.6* 34.3*   BUN mg/dL 21 19   CREATININE mg/dL 0.74 0.69   CALCIUM mg/dL 8.9 8.8   BILIRUBIN mg/dL <0.2 <0.2   ALK PHOS U/L 93 91   ALT (SGPT) U/L 21 28   AST (SGOT) U/L 19 35*   GLUCOSE mg/dL 155* 117*     Results from last 7 days   Lab Units 05/05/23  0100 05/01/23 1959   WBC 10*3/mm3 8.54 7.28   HEMOGLOBIN g/dL 7.7* 7.8*   HEMATOCRIT % 28.5* 29.2*   PLATELETS 10*3/mm3 272 195         Results from last 7 days   Lab Units 05/05/23  0308 05/05/23  0100 05/01/23  2307   HSTROP T ng/L 17* 20* 22*     Results from last 7 days   Lab Units 05/01/23 1959   PROBNP pg/mL 3,010.0*                       Invalid input(s): USDES,  BLOODU, NITRITITE, BACT, EP    Pain Management Panel          View : No data to display.                      EKG:      Sinus rhythm with nonspecific ST-T wave changes    Radiology:    XR Chest 1 View    Result Date: 5/2/2023  PROCEDURE: XR CHEST 1 VW-    HISTORY: SOA Triage Protocol, short of breath for 2-3 " days  COMPARISON: March 16, 2023.  FINDINGS: The heart is enlarged, stable. The mediastinum is unremarkable. Mild interstitial disease bilaterally is stable. There is no pneumothorax. There are no acute osseous abnormalities.      Mild interstitial disease bilaterally appears stable.        Images were reviewed, interpreted, and dictated by Dr. Angeles Collins MD Transcribed by Reyna Gerard PA-C.  This report was signed and finalized on 5/2/2023 9:12 AM by Angeles Collins MD.    CT Angiogram Chest Pulmonary Embolism    Result Date: 5/2/2023  FINAL REPORT TECHNIQUE: Multiple axial CT images were obtained through the chest following IV contrast using a CTA/PE protocol.  3D/MIP reconstruction images were also performed. This study was performed with techniques to keep radiation doses as low as reasonably achievable (ALARA). Individualized dose reduction techniques using automated exposure control or adjustment of mA and/or kV according to the patient's size were employed. CLINICAL HISTORY: Pulmonary embolism (PE) suspected, unknown D-dimer FINDINGS: PAs and aorta: No pulmonary embolus.  Thoracic aorta is unremarkable.   There is coronary artery disease. Heart/mediastinum: No evidence for right heart strain.  No pericardial effusion.    There is cardiomegaly.  Lungs: There are severe emphysema.  There is an 11 mm noncalcified pulmonary nodule in the right lower lobe best visualized on image 171 of series 5.  Lymph nodes: No pathologically enlarged thoracic lymph nodes.  Pleura: No pneumothorax or pleural effusion. Chest Wall: No chest wall contusion.  Bones: No acute fracture. Upper abdomen: No acute findings in the upper abdomen.     No pulmonary embolus.   Severe emphysema.  Coronary artery disease and cardiomegaly.  11 mm noncalcified pulmonary nodule in the right lower lobe.  Follow-up with chest CT in 3 months according to the Fleischner Society criteria. Authenticated and Electronically Signed by Anjel Lee MD on  05/02/2023 12:25:37 AM      Assessment/Plan:      Acute on chronic respiratory failure with hypoxia    COPD exacerbation  --Detailed Medical Reasoning: Need 2 of 3 for Acute respiratory failure: pO2 less than 60 mm Hg (hypoxemia) or O2 sat <90% (yes). pCO2 greater than 50 mm Hg (hypercapnia) with pH less than 7.35 (no). Signs and symptoms of acute respiratory distress (RR greater than 20 or less than 10, wheezing, nasal flaring, accessory muscle use): (Yes). --(TYLER Farmer, The Hospital[employer], 2019)    --Plan: We will provide steroids nebulizer treatments and antibiotics      Coronary artery disease involving native coronary artery of native heart with angina pectoris  --Detailed Medical Reasoning: Patient had an episode of ischemic sounding chest pain provoked by respiratory distress does not appear she is having acute coronary syndrome.  --Plan: Monitor for any further events of chest pain if this recurs may consider consult to cardiology.      Iron deficiency anemia  --Detailed Medical Reasoning: Patient was severely iron deficient in January.  --Plan: Recheck iron stores if still low consider IV iron.  Order ferrous sulfate.      Impaired mobility and ADLs  --Detailed Medical Reasoning: Patient reports she is unhappy at home does not have the social supports she needs cannot perform her ADLs like she wants.  --Plan: We will have her assessed for short-term rehab by PT and OT and case management    Disposition: Anticipate discharge to short-term rehab in 2 to 3 days    Prophylaxis: Continue Eliquis for atrial fibrillation          Edited by: Milton Urban,  at 5/5/2023 0516           Risk Assessment: High  DVT Prophylaxis: Eliquis  Code Status:   Code Status and Medical Interventions:   Ordered at: 05/05/23 0525     Code Status (Patient has no pulse and is not breathing):    CPR (Attempt to Resuscitate)     Medical Interventions (Patient has pulse or is breathing):    Full Support      Diet:   Dietary Orders  (From admission, onward)     Start     Ordered    05/05/23 0100  NPO Diet NPO Type: Strict NPO  (Chest Pain > 35 (Standing Order) - KRYSTAL)  Diet Effective Now        Question:  NPO Type  Answer:  Strict NPO    05/05/23 0100                 Advance Care Planning   ACP discussion was held with the patient during this visit. Patient does not have an advance directive, information provided.           Milton Urban DO  05/05/23  05:26 EDT    Dictated utilizing Dragon dictation.

## 2023-05-05 NOTE — DISCHARGE PLACEMENT REQUEST
"Wants STR- will send rehab notes SAMSON Dudley Aixa Wells (78 y.o. Female)     Date of Birth   1945    Social Security Number       Address   [address] [place] [address]    Home Phone   [phone]    MRN   [id number]       Restorationism   Mosque    Marital Status                               Admission Date   5/5/23    Admission Type   Emergency    Admitting Provider       Attending Provider   Tom Keyes MD    Department, Room/Bed   [facility] INTENSIVE CARE, I01/1       Discharge Date       Discharge Disposition       Discharge Destination                               Attending Provider: Tom Keyes MD    Allergies: Doxycycline    Isolation: None   Infection: None   Code Status: CPR    Ht: 165.1 cm (65\")   Wt: 73.2 kg (161 lb 6 oz)    Admission Cmt: None   Principal Problem: Acute on chronic respiratory failure with hypoxia [J96.21]                 Active Insurance as of 5/5/2023     Primary Coverage     Payor Plan Insurance Group Employer/Plan Group    MEDICARE MEDICARE A & B      Payor Plan Address Payor Plan Phone Number Payor Plan Fax Number Effective Dates    [address] [phone]  2/1/2012 - None Entered    [facility] [address]       Subscriber Name Subscriber Birth Date Member ID       AIXA DUDLEY 1945 [id number]           Secondary Coverage     Payor Plan Insurance Group Employer/Plan Group    AARP MC SUP AARP HEALTH CARE OPTIONS      Payor Plan Address Payor Plan Phone Number Payor Plan Fax Number Effective Dates    [facility] [phone]  1/1/2016 - None Entered    [address]       [facility] [address]       Subscriber Name Subscriber Birth Date Member ID       AIXA DUDLEY 1945 [id number]                 Emergency Contacts      (Rel.) Home Phone Work Phone Mobile Phone    Malu Aguilar (Daughter) [phone] -- [phone]               History & Physical      Milton Urban, DO at 05/05/23 0525      "       [facility]   HISTORY AND PHYSICAL      Name:  Gabi Dudley   Age:  78 y.o.  Sex:  female  :  1945  MRN:  [id number]   Visit Number:  [id number]  Admission Date:  2023  Date Of Service:  23  Primary Care Physician:  Paul Quinteros MD    Chief Complaint:     Shortness of air    History Of Presenting Illness:      78-year-old lady with COPD and chronic respiratory failure on home oxygen 3 to 4 L presents following an episode of chest tightness.  She reports a gradually progressive decline in her ability to perform her activities of daily living without dyspnea.  She reports that with any activity at all even doing the laundry or walking around the house she is becoming short of breath.  She reports that she was recently treated with some antibiotics and steroids for this without much improvement.  The episode she had today occurred with some mild activity which caused her dyspnea tachypnea and then chest tightness which radiated all over her chest and both of her arms and both of her jaws.  Of note they had attempted to do a stress test on her she reports 2 weeks ago but she was unable to complete it due to anxiety.  She also had pain in her ears during the episode.  By the time I am seeing her in the emergency department after receiving steroids and nebulizer treatments her breathing is back near her baseline where it has been for the last few weeks which is quite poor.  She continues to desaturate in the emergency department just going to the bedside commode.  She is fearful about going home and about her inability to do the things that she needs to at home and the lack of help that she has at home.  She elects to be full code.    Pertinent findings: pH 7.34, PCO2 73.9, troponin 20 and then 17, hemoglobin 7.7, MCV 76.8, chest x-ray shows an interstitial prominence which is unchanged since her film from May 1, 2023.  EKG shows sinus rhythm with non-specific T wave  changes P mitrale    ED Medications:    Medications   sodium chloride 0.9 % flush 10 mL (has no administration in time range)   aspirin tablet 325 mg (325 mg Oral Given 5/5/23 0125)   methylPREDNISolone sodium succinate (SOLU-Medrol) injection 125 mg (125 mg Intravenous Given 5/5/23 0129)   ipratropium-albuterol (DUO-NEB) nebulizer solution 3 mL (3 mL Nebulization Given 5/5/23 0150)   Morphine sulfate (PF) injection 2 mg (2 mg Intravenous Given 5/5/23 0125)       Edited by: Milton Urban DO at 5/5/2023 0515     Review Of Systems:    All systems were reviewed and negative except as mentioned in history of presenting illness, assessment and plan.    Past Medical History: Patient  has a past medical history of Adrenal adenoma, Anemia, Arrhythmia, Asthma, Atrial fibrillation, Back pain, Benign colonic polyp, Benign tumor of adrenal gland, Cataract, Cholelithiasis, Chronic bronchitis, Chronic bronchitis with COPD (chronic obstructive pulmonary disease), COPD (chronic obstructive pulmonary disease) (2005), Coronary artery disease, Depression, Elevated cholesterol, Environmental and seasonal allergies, Fibromyalgia, Gastritis, Generalized anxiety disorder, GERD (gastroesophageal reflux disease), H/O mammogram, Hemorrhoids, History of blood transfusion (1985), History of blood transfusion (1985), History of echocardiogram, History of endometriosis, History of nuclear stress test, Hypertension, IBS (irritable bowel syndrome), Impaired functional mobility, balance, gait, and endurance, Impaired mobility, Inverted nipple, Kidney disease, Kidney stone, Liver cyst, Nodular radiologic density, Nodule of left lung, On home oxygen therapy, Osteoarthritis, Osteoporosis, PONV (postoperative nausea and vomiting), Renal cyst, Sinus problem, Sinusitis, Skin cancer, SOB (shortness of breath), Tobacco use, Urinary frequency, Vitamin D deficiency, Wears glasses, and Wears partial dentures.    Past Surgical History: Patient  has a  past surgical history that includes Appendectomy (1980s); Tonsillectomy (1987); Colonoscopy w/ biopsies and polypectomy; Colonoscopy (03/11/2013); Colonoscopy (06/21/2016); Upper gastrointestinal endoscopy (01/13/2015); Vaginal delivery; Colonoscopy (N/A, 7/24/2019); Cataract extraction (2013); Hysterectomy (1980s); Cardiac catheterization (2003); Abdominal adhesion surgery; cholecystectomy with intraoperative cholangiogram (N/A, 10/30/2020); and Exploratory Laparotomy (N/A, 11/23/2020).    Social History: Patient  reports that she quit smoking about 2 years ago. Her smoking use included cigarettes. She has a 7.50 pack-year smoking history. She has been exposed to tobacco smoke. She has never used smokeless tobacco. She reports that she does not drink alcohol and does not use drugs.    Family History:  Patient's family history has been reviewed and found to be noncontributory.     Allergies:      Doxycycline    Home Medications:    Prior to Admission Medications     Prescriptions Last Dose Informant Patient Reported? Taking?    albuterol sulfate  (90 Base) MCG/ACT inhaler   No No    INHALE 2 PUFFS BY MOUTH EVERY FOUR HOURS AS NEEDED FOR WHEEZING    apixaban (Eliquis) 5 MG tablet tablet   No No    Take 1 tablet by mouth Every 12 (Twelve) Hours.    atorvastatin (LIPITOR) 20 MG tablet   No No    TAKE 1 TABLET BY MOUTH DAILY    benzonatate (Tessalon Perles) 100 MG capsule   No No    Take 1 capsule by mouth 3 (Three) Times a Day As Needed for Cough.    Budeson-Glycopyrrol-Formoterol (Breztri Aerosphere) 160-9-4.8 MCG/ACT aerosol inhaler   No No    Inhale 2 puffs 2 (Two) Times a Day. Rinse mouth out after use    cetirizine (zyrTEC) 10 MG tablet   No No    Take 1 tablet by mouth Daily.    Cholecalciferol (vitamin D3) 125 MCG (5000 UT) capsule capsule  Self Yes No    Take 1 capsule by mouth Daily.    clonazePAM (KlonoPIN) 1 MG tablet   No No    Take 1 tablet by mouth Daily As Needed for Anxiety.     clotrimazole-betamethasone (Lotrisone) 1-0.05 % cream   No No    Apply  topically to the appropriate area as directed 2 (Two) Times a Day.    dilTIAZem CD (CARDIZEM CD) 120 MG 24 hr capsule   No No    Take 2 capsules by mouth Daily.    DULoxetine (CYMBALTA) 60 MG capsule   No No    Take 1 capsule by mouth 2 (Two) Times a Day.    Patient taking differently:  Take 1 capsule by mouth Daily.    fluconazole (DIFLUCAN) 150 MG tablet   Yes No    fluticasone (FLONASE) 50 MCG/ACT nasal spray  Self, Child Yes No    2 sprays into the nostril(s) as directed by provider Daily.    furosemide (LASIX) 40 MG tablet   No No    Take 1 tablet by mouth Daily for 3 days.    ipratropium-albuterol (DUO-NEB) 0.5-2.5 mg/3 ml nebulizer   No No    USE 3 mL VIA NEBULIZER EVERY 4 HOURS AS NEEDED FOR WHEEZING    methocarbamol (Robaxin) 500 MG tablet   No No    1 qid po prn    O2 (OXYGEN)   Yes No    Inhale 2 L/min As Needed.    ondansetron (ZOFRAN) 4 MG tablet   No No    Take 1 tablet by mouth Every 8 (Eight) Hours As Needed for Nausea or Vomiting.    ondansetron ODT (ZOFRAN-ODT) 4 MG disintegrating tablet   No No    Place 1 tablet on the tongue Every 8 (Eight) Hours As Needed for Nausea.    oxyCODONE-acetaminophen (PERCOCET) 7.5-325 MG per tablet   Yes No    Take 1 tablet by mouth Every 4 (Four) Hours As Needed.    pantoprazole (PROTONIX) 40 MG EC tablet   No No    TAKE 1 TABLET BY MOUTH DAILY    potassium chloride 10 MEQ CR tablet   No No    Take 2 tablets by mouth Daily for 3 days.    sertraline (ZOLOFT) 100 MG tablet   No No    TAKE 1 & 1/2 TABLET BY MOUTH DAILY    traZODone (DESYREL) 100 MG tablet   No No    1 qhs po prn            Vital Signs:  Temp:  [98 °F (36.7 °C)] 98 °F (36.7 °C)  Heart Rate:  [77-89] 82  Resp:  [18-22] 22  BP: (114-174)/(42-75) 114/74        05/05/23  0057   Weight: 72.6 kg (160 lb)     Body mass index is 26.63 kg/m².    Physical Exam:     Most recent vital Signs: /74   Pulse 82   Temp 98 °F (36.7 °C)  "(Axillary)   Resp 22   Ht 165.1 cm (65\")   Wt 72.6 kg (160 lb)   SpO2 97%   BMI 26.63 kg/m²     Constitutional: Awake, alert  Eyes: PERRLA, sclerae anicteric, no conjunctival injection  HENT: NCAT, mucous membranes moist  Neck: Supple, no thyromegaly, no lymphadenopathy, trachea midline  Respiratory: Minimal audible breath sounds bilaterally, nonlabored respirations   Cardiovascular: RRR, no murmurs, rubs, or gallops, palpable pedal pulses bilaterally  Gastrointestinal: Positive bowel sounds, soft, nontender, nondistended  Musculoskeletal: No bilateral ankle edema, no clubbing or cyanosis to extremities  Psychiatric: Appropriate affect, cooperative  Neurologic: Oriented x 3, speech clear  Skin: No rashes  Edited by: Milton Urban DO at 5/5/2023 9119      Laboratory data:    I have reviewed the labs done in the emergency room.    Results from last 7 days   Lab Units 05/05/23  0100 05/01/23 1959   SODIUM mmol/L 142 143   POTASSIUM mmol/L 4.0 3.7   CHLORIDE mmol/L 99 101   CO2 mmol/L 36.6* 34.3*   BUN mg/dL 21 19   CREATININE mg/dL 0.74 0.69   CALCIUM mg/dL 8.9 8.8   BILIRUBIN mg/dL <0.2 <0.2   ALK PHOS U/L 93 91   ALT (SGPT) U/L 21 28   AST (SGOT) U/L 19 35*   GLUCOSE mg/dL 155* 117*     Results from last 7 days   Lab Units 05/05/23  0100 05/01/23 1959   WBC 10*3/mm3 8.54 7.28   HEMOGLOBIN g/dL 7.7* 7.8*   HEMATOCRIT % 28.5* 29.2*   PLATELETS 10*3/mm3 272 195         Results from last 7 days   Lab Units 05/05/23  0308 05/05/23  0100 05/01/23  2307   HSTROP T ng/L 17* 20* 22*     Results from last 7 days   Lab Units 05/01/23 1959   PROBNP pg/mL 3,010.0*                       Invalid input(s): USDES,  BLOODU, NITRITITE, BACT, EP    Pain Management Panel          View : No data to display.                      EKG:      Sinus rhythm with nonspecific ST-T wave changes    Radiology:    XR Chest 1 View    Result Date: 5/2/2023  PROCEDURE: XR CHEST 1 VW-    HISTORY: SOA Triage Protocol, short of breath for 2-3 " days  COMPARISON: March 16, 2023.  FINDINGS: The heart is enlarged, stable. The mediastinum is unremarkable. Mild interstitial disease bilaterally is stable. There is no pneumothorax. There are no acute osseous abnormalities.      Mild interstitial disease bilaterally appears stable.        Images were reviewed, interpreted, and dictated by Dr. Angeles Collins MD Transcribed by Reyna Gerard PA-C.  This report was signed and finalized on 5/2/2023 9:12 AM by Angeles Collins MD.    CT Angiogram Chest Pulmonary Embolism    Result Date: 5/2/2023  FINAL REPORT TECHNIQUE: Multiple axial CT images were obtained through the chest following IV contrast using a CTA/PE protocol.  3D/MIP reconstruction images were also performed. This study was performed with techniques to keep radiation doses as low as reasonably achievable (ALARA). Individualized dose reduction techniques using automated exposure control or adjustment of mA and/or kV according to the patient's size were employed. CLINICAL HISTORY: Pulmonary embolism (PE) suspected, unknown D-dimer FINDINGS: PAs and aorta: No pulmonary embolus.  Thoracic aorta is unremarkable.   There is coronary artery disease. Heart/mediastinum: No evidence for right heart strain.  No pericardial effusion.    There is cardiomegaly.  Lungs: There are severe emphysema.  There is an 11 mm noncalcified pulmonary nodule in the right lower lobe best visualized on image 171 of series 5.  Lymph nodes: No pathologically enlarged thoracic lymph nodes.  Pleura: No pneumothorax or pleural effusion. Chest Wall: No chest wall contusion.  Bones: No acute fracture. Upper abdomen: No acute findings in the upper abdomen.     No pulmonary embolus.   Severe emphysema.  Coronary artery disease and cardiomegaly.  11 mm noncalcified pulmonary nodule in the right lower lobe.  Follow-up with chest CT in 3 months according to the Fleischner Society criteria. Authenticated and Electronically Signed by Anjel Lee MD on  05/02/2023 12:25:37 AM      Assessment/Plan:      Acute on chronic respiratory failure with hypoxia    COPD exacerbation  --Detailed Medical Reasoning: Need 2 of 3 for Acute respiratory failure: pO2 less than 60 mm Hg (hypoxemia) or O2 sat <90% (yes). pCO2 greater than 50 mm Hg (hypercapnia) with pH less than 7.35 (no). Signs and symptoms of acute respiratory distress (RR greater than 20 or less than 10, wheezing, nasal flaring, accessory muscle use): (Yes). --(TYLER Farmer, The Hospitalist, 2019)    --Plan: We will provide steroids nebulizer treatments and antibiotics      Coronary artery disease involving native coronary artery of native heart with angina pectoris  --Detailed Medical Reasoning: Patient had an episode of ischemic sounding chest pain provoked by respiratory distress does not appear she is having acute coronary syndrome.  --Plan: Monitor for any further events of chest pain if this recurs may consider consult to cardiology.      Iron deficiency anemia  --Detailed Medical Reasoning: Patient was severely iron deficient in January.  --Plan: Recheck iron stores if still low consider IV iron.  Order ferrous sulfate.      Impaired mobility and ADLs  --Detailed Medical Reasoning: Patient reports she is unhappy at home does not have the social supports she needs cannot perform her ADLs like she wants.  --Plan: We will have her assessed for short-term rehab by PT and OT and case management    Disposition: Anticipate discharge to short-term rehab in 2 to 3 days    Prophylaxis: Continue Eliquis for atrial fibrillation          Edited by: Milton Urban,  at 5/5/2023 0516           Risk Assessment: High  DVT Prophylaxis: Eliquis  Code Status:   Code Status and Medical Interventions:   Ordered at: 05/05/23 0525     Code Status (Patient has no pulse and is not breathing):    CPR (Attempt to Resuscitate)     Medical Interventions (Patient has pulse or is breathing):    Full Support      Diet:   Dietary Orders  (From admission, onward)     Start     Ordered    05/05/23 0100  NPO Diet NPO Type: Strict NPO  (Chest Pain > 35 (Standing Order) - [facility])  Diet Effective Now        Question:  NPO Type  Answer:  Strict NPO    05/05/23 0100                 Advance Care Planning   ACP discussion was held with the patient during this visit. Patient does not have an advance directive, information provided.          Milton Urban DO  05/05/23  05:26 EDT    Dictated utilizing Dragon dictation.      Electronically signed by Milton Urban DO at 05/05/23 0528         Current Facility-Administered Medications   Medication Dose Route Frequency Provider Last Rate Last Admin   • acetaminophen (TYLENOL) tablet 650 mg  650 mg Oral Q4H PRN Milton Urban DO       • albuterol sulfate HFA (PROVENTIL HFA;VENTOLIN HFA;PROAIR HFA) inhaler 2 puff  2 puff Inhalation Q4H PRN Milton Urban DO       • apixaban (ELIQUIS) tablet 5 mg  5 mg Oral Q12H Milton Urban DO   5 mg at 05/05/23 0551   • atorvastatin (LIPITOR) tablet 20 mg  20 mg Oral Daily Milton Urban DO   20 mg at 05/05/23 0829   • azithromycin (ZITHROMAX) 500 mg 0.9% NaCl (Add-vantage) 250 mL  500 mg Intravenous Q24H Milton Urban DO   500 mg at 05/05/23 0548   • benzonatate (TESSALON) capsule 100 mg  100 mg Oral TID PRN Milton Urban DO   100 mg at 05/05/23 0602   • sennosides-docusate (PERICOLACE) 8.6-50 MG per tablet 2 tablet  2 tablet Oral BID Milton Urban DO   2 tablet at 05/05/23 0829    And   • polyethylene glycol (MIRALAX) packet 17 g  17 g Oral Daily PRN Milton Urban DO        And   • bisacodyl (DULCOLAX) EC tablet 5 mg  5 mg Oral Daily PRN Milton Urban DO        And   • bisacodyl (DULCOLAX) suppository 10 mg  10 mg Rectal Daily PRN Milton Urban DO       • budesonide-formoterol (SYMBICORT) 160-4.5 MCG/ACT inhaler 2 puff  2 puff Inhalation BID - RT Milton Urban, DO   2 puff at 05/05/23 0723   • calcium  carbonate (TUMS) chewable tablet 500 mg (200 mg elemental)  2 tablet Oral BID PRN Milton Urban, DO       • Calcium Replacement - Follow Nurse / BPA Driven Protocol   Does not apply PRN Milton Urban DO       • cetirizine (zyrTEC) tablet 10 mg  10 mg Oral Daily Milton Urban DO   10 mg at 05/05/23 0829   • clonazePAM (KlonoPIN) tablet 1 mg  1 mg Oral Daily PRN Milton Urban DO   1 mg at 05/05/23 0602   • clotrimazole-betamethasone (LOTRISONE) 1-0.05 % cream   Topical BID Milton Urban DO   Given at 05/05/23 0831   • dilTIAZem CD (CARDIZEM CD) 24 hr capsule 240 mg  240 mg Oral Daily Milton Urban DO   240 mg at 05/05/23 0829   • DULoxetine (CYMBALTA) DR capsule 60 mg  60 mg Oral Daily Milton Urban DO   60 mg at 05/05/23 0829   • ferrous sulfate EC tablet 324 mg  324 mg Oral Daily With Breakfast Milton Urban DO   324 mg at 05/05/23 0829   • fluticasone (FLONASE) 50 MCG/ACT nasal spray 2 spray  2 spray Nasal Daily Milton Urban DO   2 spray at 05/05/23 0831   • guaiFENesin-dextromethorphan (ROBITUSSIN DM) 100-10 MG/5ML syrup 10 mL  10 mL Oral Q6H PRN Milton Urban DO       • ipratropium-albuterol (DUO-NEB) nebulizer solution 3 mL  3 mL Nebulization Q4H PRN Milton Urban DO       • Magnesium Standard Dose Replacement - Follow Nurse / BPA Driven Protocol   Does not apply PRN Milton Urban DO       • melatonin tablet 5 mg  5 mg Oral Nightly Milton Urban DO       • ondansetron (ZOFRAN) tablet 4 mg  4 mg Oral Q6H PRN Milton Urban DO        Or   • ondansetron (ZOFRAN) injection 4 mg  4 mg Intravenous Q6H PRN Milton Urban DO       • ondansetron (ZOFRAN) tablet 4 mg  4 mg Oral Q8H PRN Milton Urban, DO       • oxyCODONE-acetaminophen (PERCOCET) 7.5-325 MG per tablet 1 tablet  1 tablet Oral Q4H PRN Milton Urban, DO       • pantoprazole (PROTONIX) EC tablet 40 mg  40 mg Oral Daily Milton Urban, DO   40  mg at 05/05/23 0829   • Phosphorus Replacement - Follow Nurse / BPA Driven Protocol   Does not apply PRN Milton Urban DO       • Potassium Replacement - Follow Nurse / BPA Driven Protocol   Does not apply PRN Milton Urban DO       • predniSONE (DELTASONE) tablet 40 mg  40 mg Oral Daily With Breakfast Milton Urban DO   40 mg at 05/05/23 0830   • sertraline (ZOLOFT) tablet 150 mg  150 mg Oral Daily Milton Urban DO   150 mg at 05/05/23 0829   • sodium chloride 0.9 % flush 10 mL  10 mL Intravenous PRN Milton Urban, DO       • sodium chloride 0.9 % flush 10 mL  10 mL Intravenous Q12H Milton Urban DO   10 mL at 05/05/23 0830   • sodium chloride 0.9 % flush 10 mL  10 mL Intravenous PRN Milton Urban, DO       • sodium chloride 0.9 % infusion 40 mL  40 mL Intravenous PRN Milton Urban DO       • tiotropium (SPIRIVA RESPIMAT) 2.5 mcg/act aerosol solution inhaler  2 puff Inhalation Daily - RT Milton Urban DO   2 puff at 05/05/23 0723   • traZODone (DESYREL) tablet 100 mg  100 mg Oral Nightly PRN Milton Urban DO       • vitamin D3 capsule 5,000 Units  5,000 Units Oral Daily Milton Urban DO   5,000 Units at 05/05/23 0829        Physical Therapy Notes (most recent note)      Farzad Crowder, PT at 05/05/23 1119  Version 1 of 1       PT eval attempted this date. Pt states she had just been up to [facility] and needs to rest today. PT to f/u with Pt tomorrow.       Electronically signed by Farzad Crowder, PT at 05/05/23 1119          Occupational Therapy Notes (most recent note)      Aimee Allan, OT at 05/05/23 0930        Patient declined OT eval d/t no sleep. Nursing reports patient has been up and down to [facility] several times. Will follow up at later date.     Electronically signed by Aimee Allan OT at 05/05/23 1218

## 2023-05-05 NOTE — THERAPY EVALUATION
PT jr attempted this date. Pt states she had just been up to [facility] and needs to rest today. PT to f/u with Pt tomorrow.

## 2023-05-05 NOTE — ED PROVIDER NOTES
Subjective  History of Present Illness:    Chief Complaint: Short of breath cough wheezing chest tightness  History of Present Illness: 78-year-old female end-stage COPD 3 to 4 L nasal cannula requirement, history of anticoagulated A-fib, here with above complaints.  States she had an episode of shortness of breath cough and tightness tonight.  Lives by herself.  Has been on amoxicillin for at least a week, was seen and evaluated emergency department on May 1, had extensive work-up at that time including CT chest PE protocol, also had exacerbation of A-fib which converted after rate control    Nurses Notes reviewed and agree, including vitals, allergies, social history and prior medical history.     REVIEW OF SYSTEMS: All systems reviewed and not pertinent unless noted.  Review of Systems      Positive for: Short of breath cough wheezing chest tightness    Negative for: Fever hemoptysis palpitations edema    Past Medical History:   Diagnosis Date   • Adrenal adenoma    • Anemia    • Arrhythmia    • Asthma    • Atrial fibrillation    • Back pain    • Benign colonic polyp    • Benign tumor of adrenal gland    • Cataract     bilateral   • Cholelithiasis    • Chronic bronchitis    • Chronic bronchitis with COPD (chronic obstructive pulmonary disease)    • COPD (chronic obstructive pulmonary disease) 2005   • Coronary artery disease    • Depression    • Elevated cholesterol    • Environmental and seasonal allergies    • Fibromyalgia    • Gastritis    • Generalized anxiety disorder    • GERD (gastroesophageal reflux disease)    • H/O mammogram    • Hemorrhoids    • History of blood transfusion 1985   • History of blood transfusion 1985   • History of echocardiogram    • History of endometriosis    • History of nuclear stress test    • Hypertension    • IBS (irritable bowel syndrome)    • Impaired functional mobility, balance, gait, and endurance    • Impaired mobility    • Inverted nipple    • Kidney disease    • Kidney  stone    • Liver cyst    • Nodular radiologic density    • Nodule of left lung    • On home oxygen therapy     2 liters NC QHS   • Osteoarthritis    • Osteoporosis    • PONV (postoperative nausea and vomiting)    • Renal cyst    • Sinus problem        • Sinusitis    • Skin cancer     basal cell carcinoma   • SOB (shortness of breath)    • Tobacco use    • Urinary frequency    • Vitamin D deficiency    • Wears glasses    • Wears partial dentures     upper plate       Allergies:    Doxycycline      Past Surgical History:   Procedure Laterality Date   • APPENDECTOMY     • CARDIAC CATHETERIZATION     • CATARACT EXTRACTION      both eyes   • CHOLECYSTECTOMY WITH INTRAOPERATIVE CHOLANGIOGRAM N/A 10/30/2020    Procedure: CHOLECYSTECTOMY LAPAROSCOPIC INTRAOPERATIVE CHOLANGIOGRAPHY;  Surgeon: Sima Moses MD;  Location: [facility] OR;  Service: General;  Laterality: N/A;   • COLONOSCOPY  2013   • COLONOSCOPY  2016   • COLONOSCOPY N/A 2019    Procedure: COLONOSCOPY W/ COLD FORCEP POLYPECTOMIES; HOT SNARE POLYPECTOMIES; COLD SNARE POLYPECTOMY;  Surgeon: Goyo Nunez MD;  Location: [facility] ENDOSCOPY;  Service: Gastroenterology   • COLONOSCOPY W/ BIOPSIES AND POLYPECTOMY     • EXPLORATORY LAPAROTOMY N/A 2020    Procedure: colectomy, right, closure of enterotomy x 2, reduction of internal volvulus;  Surgeon: Sima Moses MD;  Location: [facility] OR;  Service: General;  Laterality: N/A;   • HYSTERECTOMY      partial   • LYSIS OF ABDOMINAL ADHESIONS     • TONSILLECTOMY     • UPPER GASTROINTESTINAL ENDOSCOPY  2015   • VAGINAL DELIVERY      x2         Social History     Socioeconomic History   • Marital status:    Tobacco Use   • Smoking status: Former     Packs/day: 0.25     Years: 30.00     Pack years: 7.50     Types: Cigarettes     Quit date: 2020     Years since quittin.5     Passive exposure: Past   • Smokeless tobacco: Never   Vaping Use   • Vaping Use:  "Never used   Substance and Sexual Activity   • Alcohol use: No   • Drug use: No   • Sexual activity: Defer         Family History   Problem Relation Age of Onset   • Stomach cancer Brother    • Colon cancer Maternal Aunt    • Brain cancer Father    • Arthritis Father    • Hypertension Father    • Lung cancer Paternal Grandfather    • Breast cancer Neg Hx    • Ovarian cancer Neg Hx        Objective  Physical Exam:  /74   Pulse 82   Temp 98 °F (36.7 °C) (Axillary)   Resp 22   Ht 165.1 cm (65\")   Wt 72.6 kg (160 lb)   SpO2 91%   BMI 26.63 kg/m²      Physical Exam    CONSTITUTIONAL: Well developed, chronically ill-appearing 78-year-old female, with some slight increased work of breathing,.  VITAL SIGNS: per nursing, reviewed and noted  SKIN: exposed skin with no rashes, ulcerations or petechiae  EYES: Grossly EOMI, no icterus  ENT: Normal voice.  Moist mucous membranes   RESPIRATORY: Slightly increased work of breathing, diffuse wheezes, coarse rhonchi, harsh cough  CARDIOVASCULAR:   Extremities pink and warm.  Good cap refill to extremities.   GI: Abdomen without distention   MUSCULOSKELETAL: Age-appropriate bulk and tone, moves all 4 extremities  NEUROLOGIC: Alert, oriented x 3. No gross deficits. GCS 15.   PSYCH: appropriate affect.  : no bladder tenderness or distention, no CVA tenderness    Procedures    ED Course:    Lab Results (last 24 hours)     Procedure Component Value Units Date/Time    CBC & Differential [[id number]]  (Abnormal) Collected: 05/05/23 0100    Specimen: Blood Updated: 05/05/23 0120    Narrative:      The following orders were created for panel order CBC & Differential.  Procedure                               Abnormality         Status                     ---------                               -----------         ------                     CBC Auto Differential[[id number]]        Abnormal            Final result               Scan Slide[[id number]]                                   "     Final result                 Please view results for these tests on the individual orders.    Comprehensive Metabolic Panel [[id number]]  (Abnormal) Collected: 05/05/23 0100    Specimen: Blood Updated: 05/05/23 0125     Glucose 155 mg/dL      BUN 21 mg/dL      Creatinine 0.74 mg/dL      Sodium 142 mmol/L      Potassium 4.0 mmol/L      Chloride 99 mmol/L      CO2 36.6 mmol/L      Calcium 8.9 mg/dL      Total Protein 6.8 g/dL      Albumin 4.1 g/dL      ALT (SGPT) 21 U/L      AST (SGOT) 19 U/L      Alkaline Phosphatase 93 U/L      Total Bilirubin <0.2 mg/dL      Globulin 2.7 gm/dL      A/G Ratio 1.5 g/dL      BUN/Creatinine Ratio 28.4     Anion Gap 6.4 mmol/L      eGFR 84.0 mL/min/1.73     Narrative:      GFR Normal >60  Chronic Kidney Disease <60  Kidney Failure <15    The GFR formula is only valid for adults with stable renal function between ages 18 and 70.    High Sensitivity Troponin T [[id number]]  (Abnormal) Collected: 05/05/23 0100    Specimen: Blood Updated: 05/05/23 0127     HS Troponin T 20 ng/L     Narrative:      High Sensitive Troponin T Reference Range:  <10.0 ng/L- Negative Female for AMI  <15.0 ng/L- Negative Male for AMI  >=10 - Abnormal Female indicating possible myocardial injury.  >=15 - Abnormal Male indicating possible myocardial injury.   Clinicians would have to utilize clinical acumen, EKG, Troponin, and serial changes to determine if it is an Acute Myocardial Infarction or myocardial injury due to an underlying chronic condition.         CBC Auto Differential [[id number]]  (Abnormal) Collected: 05/05/23 0100    Specimen: Blood Updated: 05/05/23 0120     WBC 8.54 10*3/mm3      RBC 3.71 10*6/mm3      Hemoglobin 7.7 g/dL      Hematocrit 28.5 %      MCV 76.8 fL      MCH 20.8 pg      MCHC 27.0 g/dL      RDW 18.3 %      RDW-SD 51.1 fl      MPV 10.1 fL      Platelets 272 10*3/mm3      Neutrophil % 71.1 %      Lymphocyte % 18.5 %      Monocyte % 7.8 %      Eosinophil % 1.6 %      Basophil % 0.5 %   "    Immature Grans % 0.5 %      Neutrophils, Absolute 6.07 10*3/mm3      Lymphocytes, Absolute 1.58 10*3/mm3      Monocytes, Absolute 0.67 10*3/mm3      Eosinophils, Absolute 0.14 10*3/mm3      Basophils, Absolute 0.04 10*3/mm3      Immature Grans, Absolute 0.04 10*3/mm3      nRBC 0.0 /100 WBC     Scan Slide [[id number]] Collected: 05/05/23 0100    Specimen: Blood Updated: 05/05/23 0120     Anisocytosis Slight/1+     Hypochromia Slight/1+     Poikilocytes Slight/1+     WBC Morphology Normal     Platelet Estimate Adequate    Procalcitonin [[id number]]  (Normal) Collected: 05/05/23 0100    Specimen: Blood Updated: 05/05/23 0136     Procalcitonin 0.06 ng/mL     Narrative:      As a Marker for Sepsis (Non-Neonates):    1. <0.5 ng/mL represents a low risk of severe sepsis and/or septic shock.  2. >2 ng/mL represents a high risk of severe sepsis and/or septic shock.    As a Marker for Lower Respiratory Tract Infections that require antibiotic therapy:    PCT on Admission    Antibiotic Therapy       6-12 Hrs later    >0.5                Strongly Recommended  >0.25 - <0.5        Recommended   0.1 - 0.25          Discouraged              Remeasure/reassess PCT  <0.1                Strongly Discouraged     Remeasure/reassess PCT    As 28 day mortality risk marker: \"Change in Procalcitonin Result\" (>80% or <=80%) if Day 0 (or Day 1) and Day 4 values are available. Refer to http://www.[facility]-pct-calculator.com    Change in PCT <=80%  A decrease of PCT levels below or equal to 80% defines a positive change in PCT test result representing a higher risk for 28-day all-cause mortality of patients diagnosed with severe sepsis for septic shock.    Change in PCT >80%  A decrease of PCT levels of more than 80% defines a negative change in PCT result representing a lower risk for 28-day all-cause mortality of patients diagnosed with severe sepsis or septic shock.       Lactic Acid, Plasma [[id number]]  (Normal) Collected: 05/05/23 0126    " Specimen: Blood Updated: 05/05/23 0144     Lactate 2.0 mmol/L     Blood Gas, Venous With Co-Ox [[id number]]  (Abnormal) Collected: 05/05/23 0154    Specimen: Venous Blood Updated: 05/05/23 0154     Site IVC     pH, Venous 7.340 pH Units      pCO2, Venous 73.9 mm Hg      Comment: 83 Value above reference range        pO2, Venous 31.4 mm Hg      HCO3, Venous 39.8 mmol/L      Comment: 83 Value above reference range        Base Excess, Venous 12.4 mmol/L      Comment: 83 Value above reference range        O2 Saturation, Venous 57.7 %      Oxyhemoglobin Venous 56.4 %      Comment: 84 Value below reference range        Methemoglobin Venous 1.0 %      Carboxyhemoglobin Venous 1.3 %      Barometric Pressure for Blood Gas 735 mmHg      Modality Room Air     Ventilator Mode NA     Note --     Collected by RN     Comment: Meter: [id number]     :  [id number]       High Sensitivity Troponin T 2Hr [[id number]]  (Abnormal) Collected: 05/05/23 0308    Specimen: Blood Updated: 05/05/23 0333     HS Troponin T 17 ng/L      Troponin T Delta -3 ng/L     Narrative:      High Sensitive Troponin T Reference Range:  <10.0 ng/L- Negative Female for AMI  <15.0 ng/L- Negative Male for AMI  >=10 - Abnormal Female indicating possible myocardial injury.  >=15 - Abnormal Male indicating possible myocardial injury.   Clinicians would have to utilize clinical acumen, EKG, Troponin, and serial changes to determine if it is an Acute Myocardial Infarction or myocardial injury due to an underlying chronic condition.                No radiology results from the last 24 hrs     [facility]    ED Course as of 05/05/23 0449   Fri May 05, 2023   0109 EKG interpreted by me reveals sinus rhythm at a rate 91.  Occasional PVCs.  No ischemic changes. [PF]   0408 Dr. Urban will evaluate patient at the bedside [PF]      ED Course User Index  [PF] Bakari Ceja,        Medical Decision Making:    Initial impression of presenting illness: 78-year-old female  history of COPD A-fib, multiple medical issues, lives by herself, here with shortness of breath, productive cough, chest tightness, wheezing    DDX: includes but is not limited to: Pneumonia, COPD exacerbation,         Patient arrives normotensive afebrile no tachycardia oxygen saturations 90% on baseline 4 L with vitals interpreted by myself.     Pertinent features from physical exam: Diffuse wheezes, coarse rhonchi and harsh cough no edema.    Initial diagnostic plan: Short of breath protocol labs and chest x-ray.  Solu-Medrol duo nebs, blood gas    Results from initial plan were reviewed and interpreted by me revealing chest x-ray interpreted by me suggest chronic interstitial changes.  pH 7.34.  PCO2 73.9.  Initial troponin 21, follow-up troponin 17, normal lactate and procalcitonin.  Stable chronic anemia hemoglobin 7.7 hematocrit 28.5, last on file hemoglobin is 7.8.  Nonactionable CMP, normal white cell count.  Diagnostic information from other sources: Old record review    Interventions / Re-evaluation: Patient had persistent wheezes rhonchi and harsh cough after ER interventions of steroids and nebs.  Patient had oxygen desaturations into the low 80s with light activity just sitting up in the bed, or during conversation    Results/clinical rationale were discussed with patient    Consultations/Discussion of results with other physicians: Dr. Urban hospitalist  For admission for COPD exacerbation    Disposition plan: He will evaluate emergency department, for admission  -----      Final diagnoses:   COPD exacerbation        Bakari Ceja,   05/09/23 0115

## 2023-05-06 LAB
ANION GAP SERPL CALCULATED.3IONS-SCNC: 7.7 MMOL/L (ref 5–15)
BUN SERPL-MCNC: 19 MG/DL (ref 8–23)
BUN/CREAT SERPL: 38 (ref 7–25)
CALCIUM SPEC-SCNC: 8.5 MG/DL (ref 8.6–10.5)
CHLORIDE SERPL-SCNC: 102 MMOL/L (ref 98–107)
CO2 SERPL-SCNC: 33.3 MMOL/L (ref 22–29)
CREAT SERPL-MCNC: 0.5 MG/DL (ref 0.57–1)
DEPRECATED RDW RBC AUTO: 50.7 FL (ref 37–54)
EGFRCR SERPLBLD CKD-EPI 2021: 97.3 ML/MIN/1.73
ERYTHROCYTE [DISTWIDTH] IN BLOOD BY AUTOMATED COUNT: 18.2 % (ref 12.3–15.4)
GLUCOSE SERPL-MCNC: 120 MG/DL (ref 65–99)
HCT VFR BLD AUTO: 27.2 % (ref 34–46.6)
HGB BLD-MCNC: 7.2 G/DL (ref 12–15.9)
MCH RBC QN AUTO: 20.5 PG (ref 26.6–33)
MCHC RBC AUTO-ENTMCNC: 26.5 G/DL (ref 31.5–35.7)
MCV RBC AUTO: 77.5 FL (ref 79–97)
PLATELET # BLD AUTO: 252 10*3/MM3 (ref 140–450)
PMV BLD AUTO: 10.9 FL (ref 6–12)
POTASSIUM SERPL-SCNC: 4 MMOL/L (ref 3.5–5.2)
RBC # BLD AUTO: 3.51 10*6/MM3 (ref 3.77–5.28)
SODIUM SERPL-SCNC: 143 MMOL/L (ref 136–145)
WBC NRBC COR # BLD: 9.33 10*3/MM3 (ref 3.4–10.8)

## 2023-05-06 PROCEDURE — 25010000002 AZITHROMYCIN PER 500 MG: Performed by: INTERNAL MEDICINE

## 2023-05-06 PROCEDURE — 99232 SBSQ HOSP IP/OBS MODERATE 35: CPT | Performed by: FAMILY MEDICINE

## 2023-05-06 PROCEDURE — 85027 COMPLETE CBC AUTOMATED: CPT | Performed by: FAMILY MEDICINE

## 2023-05-06 PROCEDURE — 94761 N-INVAS EAR/PLS OXIMETRY MLT: CPT

## 2023-05-06 PROCEDURE — 94664 DEMO&/EVAL PT USE INHALER: CPT

## 2023-05-06 PROCEDURE — 63710000001 PREDNISONE PER 1 MG: Performed by: INTERNAL MEDICINE

## 2023-05-06 PROCEDURE — 97161 PT EVAL LOW COMPLEX 20 MIN: CPT

## 2023-05-06 PROCEDURE — 94799 UNLISTED PULMONARY SVC/PX: CPT

## 2023-05-06 PROCEDURE — 80048 BASIC METABOLIC PNL TOTAL CA: CPT | Performed by: FAMILY MEDICINE

## 2023-05-06 RX ORDER — ECHINACEA PURPUREA EXTRACT 125 MG
2 TABLET ORAL AS NEEDED
Status: DISCONTINUED | OUTPATIENT
Start: 2023-05-06 | End: 2023-05-11 | Stop reason: HOSPADM

## 2023-05-06 RX ADMIN — PREDNISONE 40 MG: 20 TABLET ORAL at 09:02

## 2023-05-06 RX ADMIN — PANTOPRAZOLE SODIUM 40 MG: 40 TABLET, DELAYED RELEASE ORAL at 09:03

## 2023-05-06 RX ADMIN — OXYCODONE HYDROCHLORIDE AND ACETAMINOPHEN 1 TABLET: 7.5; 325 TABLET ORAL at 20:48

## 2023-05-06 RX ADMIN — SENNOSIDES AND DOCUSATE SODIUM 2 TABLET: 50; 8.6 TABLET ORAL at 09:02

## 2023-05-06 RX ADMIN — CLOTRIMAZOLE AND BETAMETHASONE DIPROPIONATE: 10; .5 CREAM TOPICAL at 09:01

## 2023-05-06 RX ADMIN — OXYCODONE HYDROCHLORIDE AND ACETAMINOPHEN 1 TABLET: 7.5; 325 TABLET ORAL at 09:29

## 2023-05-06 RX ADMIN — CETIRIZINE HYDROCHLORIDE 10 MG: 10 TABLET, FILM COATED ORAL at 09:01

## 2023-05-06 RX ADMIN — BENZONATATE 100 MG: 100 CAPSULE ORAL at 10:00

## 2023-05-06 RX ADMIN — BUDESONIDE AND FORMOTEROL FUMARATE DIHYDRATE 2 PUFF: 160; 4.5 AEROSOL RESPIRATORY (INHALATION) at 07:11

## 2023-05-06 RX ADMIN — APIXABAN 5 MG: 5 TABLET, FILM COATED ORAL at 17:55

## 2023-05-06 RX ADMIN — OXYCODONE HYDROCHLORIDE AND ACETAMINOPHEN 1 TABLET: 7.5; 325 TABLET ORAL at 15:24

## 2023-05-06 RX ADMIN — FLUTICASONE PROPIONATE 2 SPRAY: 50 SPRAY, METERED NASAL at 09:01

## 2023-05-06 RX ADMIN — CLONAZEPAM 1 MG: 0.5 TABLET ORAL at 17:55

## 2023-05-06 RX ADMIN — Medication 10 ML: at 20:49

## 2023-05-06 RX ADMIN — SODIUM CHLORIDE 500 MG: 900 INJECTION, SOLUTION INTRAVENOUS at 06:04

## 2023-05-06 RX ADMIN — Medication 10 ML: at 09:04

## 2023-05-06 RX ADMIN — SERTRALINE 150 MG: 50 TABLET, FILM COATED ORAL at 09:02

## 2023-05-06 RX ADMIN — DILTIAZEM HYDROCHLORIDE 240 MG: 240 CAPSULE, COATED, EXTENDED RELEASE ORAL at 09:03

## 2023-05-06 RX ADMIN — APIXABAN 5 MG: 5 TABLET, FILM COATED ORAL at 06:04

## 2023-05-06 RX ADMIN — DULOXETINE 60 MG: 30 CAPSULE, DELAYED RELEASE ORAL at 09:02

## 2023-05-06 RX ADMIN — FERROUS SULFATE TAB EC 324 MG (65 MG FE EQUIVALENT) 324 MG: 324 (65 FE) TABLET DELAYED RESPONSE at 09:02

## 2023-05-06 RX ADMIN — TIOTROPIUM BROMIDE INHALATION SPRAY 2 PUFF: 3.12 SPRAY, METERED RESPIRATORY (INHALATION) at 07:11

## 2023-05-06 RX ADMIN — Medication 5000 UNITS: at 09:01

## 2023-05-06 RX ADMIN — ATORVASTATIN CALCIUM 20 MG: 20 TABLET, FILM COATED ORAL at 09:03

## 2023-05-06 RX ADMIN — TRAZODONE HYDROCHLORIDE 100 MG: 50 TABLET ORAL at 20:47

## 2023-05-06 NOTE — PROGRESS NOTES
[facility]    Name:  Gabi Dudley   Age:  78 y.o.  Sex:  female  :  1945  MRN:  [id number]   Visit Number:  [id number]  Admission Date:  2023  Date Of Service:  23  Primary Care Physician:  Paul Quinteros MD     LOS: 1 day :    Chief Complaint:      Shortness of breath    Subjective:    Patient was seen and examined at bedside today.  Patient sitting up comfortably in bed with no distress.  Patient still has cough that is productive and wheezing.  patient is more awake and energetic today and oriented x3.  Patient reports dry nose and sometimes she notices dry blood coming from her nose from using the oxygen chronically.  No other complaints.  Patient denies chest pain.  Patient tolerating p.o. well.  Patient on 4 L through nasal cannula which is her baseline oxygen requirement.  Patient otherwise hemodynamically stable.  Patient requesting rehab for placement.    Hospital Course:    78-year-old lady with COPD and chronic respiratory failure on home oxygen 3 to 4 L presents following an episode of chest tightness.  She reports a gradually progressive decline in her ability to perform her activities of daily living without dyspnea.  She reports that with any activity at all even doing the laundry or walking around the house she is becoming short of breath.  She reports that she was recently treated with some antibiotics and steroids for this without much improvement.  The episode she had today occurred with some mild activity which caused her dyspnea tachypnea and then chest tightness which radiated all over her chest and both of her arms and both of her jaws.  Of note they had attempted to do a stress test on her she reports 2 weeks ago but she was unable to complete it due to anxiety.  She also had pain in her ears during the episode.  By the time I am seeing her in the emergency department after receiving steroids and nebulizer treatments her  breathing is back near her baseline where it has been for the last few weeks which is quite poor.  She continues to desaturate in the emergency department just going to the bedside commode.  She is fearful about going home and about her inability to do the things that she needs to at home and the lack of help that she has at home.  She elects to be full code.    Pertinent findings: pH 7.34, PCO2 73.9, troponin 20 and then 17, hemoglobin 7.7, MCV 76.8, chest x-ray shows an interstitial prominence which is unchanged since her film from May 1, 2023.  EKG shows sinus rhythm with non-specific T wave changes     Review of Systems:     All systems were reviewed and negative except as mentioned in subjective, assessment and plan.    Vital Signs:    Temp:  [97.4 °F (36.3 °C)-98.8 °F (37.1 °C)] 98.2 °F (36.8 °C)  Heart Rate:  [68-96] 72  Resp:  [17-21] 18  BP: (101-149)/(55-75) 101/59    Intake and output:    I/O last 3 completed shifts:  In: 1040 [P.O.:540; IV Piggyback:500]  Out: 2750 [Urine:2750]  I/O this shift:  In: 480 [P.O.:480]  Out: -     Physical Examination:    General Appearance:  Alert and cooperative.  Chronically ill-appearing.   Head:  Atraumatic and normocephalic.   Eyes: Conjunctivae and sclerae normal, no icterus. No pallor.   Throat: No oral lesions, no thrush, oral mucosa moist.   Neck: Supple, trachea midline, no thyromegaly.   Lungs:   Breath sounds heard bilaterally equally.  Bilateral diffuse expiratory wheezing, no crackles, decreased air movement bilaterally.  Nonlabored respirations.  Coughing noted.    Heart:  Normal S1 and S2, no murmur, no gallop, no rub. No JVD.   Abdomen:   Normal bowel sounds, no masses, no organomegaly. Soft, nontender, nondistended, no rebound tenderness.   Extremities: Supple, no edema, no cyanosis, no clubbing.   Skin: No bleeding or rash.   Neurologic: Alert and oriented x 3. No facial asymmetry. Moves all four limbs. No tremors.      Laboratory results:    Results from  last 7 days   Lab Units 05/06/23  0618 05/05/23  0629 05/05/23  0100 05/01/23  1959   SODIUM mmol/L 143 138 142 143   POTASSIUM mmol/L 4.0 4.2 4.0 3.7   CHLORIDE mmol/L 102 96* 99 101   CO2 mmol/L 33.3* 33.1* 36.6* 34.3*   BUN mg/dL 19 22 21 19   CREATININE mg/dL 0.50* 0.60 0.74 0.69   CALCIUM mg/dL 8.5* 8.4* 8.9 8.8   BILIRUBIN mg/dL  --  <0.2 <0.2 <0.2   ALK PHOS U/L  --  86 93 91   ALT (SGPT) U/L  --  19 21 28   AST (SGOT) U/L  --  16 19 35*   GLUCOSE mg/dL 120* 174* 155* 117*     Results from last 7 days   Lab Units 05/06/23  0618 05/05/23  0629 05/05/23  0100   WBC 10*3/mm3 9.33 8.80 8.54   HEMOGLOBIN g/dL 7.2* 7.3* 7.7*   HEMATOCRIT % 27.2* 27.8* 28.5*   PLATELETS 10*3/mm3 252 245 272         Results from last 7 days   Lab Units 05/05/23  0308 05/05/23  0100 05/01/23  2307   HSTROP T ng/L 17* 20* 22*         Recent Labs     11/21/22  1027 01/23/23  0050   PHART 7.443 7.334*   MLV4BGN 47.0* 67.6*   PO2ART 68.9* 156.0*   HGB4MXM 32.1* 36.0*   BASEEXCESS 7.1* 8.7*      I have reviewed the patient's laboratory results.    Radiology results:    XR Chest 1 View    Result Date: 5/5/2023  PROCEDURE: XR CHEST 1 VW-  HISTORY: Chest Pain Triage Protocol, shortness of breath for a few days  COMPARISON: 05/01/2023.  FINDINGS: The heart is mildly enlarged, stable. Patient is rotated to the right. There is mild interstitial disease bilaterally which is stable from prior exam.. The lungs are clear. The mediastinum is unremarkable. There is no pneumothorax.  There are no acute osseous abnormalities. Apical lordotic positioning noted.       Impression: Stable chest..    This report was signed and finalized on 5/5/2023 7:56 AM by Angeles Collins MD.    I have reviewed the patient's radiology reports.    Medication Review:     I have reviewed the patient's active and prn medications.     Problem List:      Acute on chronic respiratory failure with hypoxia    Coronary artery disease involving native coronary artery of native heart  without angina pectoris    COPD exacerbation    Iron deficiency anemia    Impaired mobility and ADLs      Assessment:    Acute on chronic respiratory failure with hypoxia, POA  Acute COPD exacerbation, POA  Elevated troponin, likely demand ischemia, POA  Coronary artery disease  Acute on chronic iron deficiency anemia, POA  Chronic A-fib, on Eliquis  Impaired mobility and ADLs    Plan:    Acute on chronic respiratory failure with hypoxia  COPD exacerbation  - Continue supplemental oxygen to keep saturation above 90%.  Patient titrated down to her baseline oxygen requirement of 4 L through nasal cannula.  - Continue prednisone 40 mg daily.   -Continue nebulizer treatments  - On azithromycin for 3 days for COPD exacerbation coverage.  -Continue supportive care.     Elevated troponin  -likely demand ischemia and chronically elevated in the settings of respiratory failure.  -Patient currently with no chest pain, low suspicion for ACS  -Troponin plateaued and trended down.    Iron deficiency anemia, chronic  -Patient was severely iron deficient in January. No obvious source of bleeding.  -Iron stores low.   -s/p IV iron.    -Started on ferrous sulfate.  -Hemoglobin at 7.2 today, will continue to monitor and transfuse as indicated to keep hemoglobin above 7.       Impaired mobility and ADLs  -Patient reports she is unhappy at home does not have the social supports she needs cannot perform her ADLs like she wants.  - assessed by PT and OT and case management, plan on going to short-term rehab.    Further orders as indicated per clinical course.    DVT Prophylaxis: On Eliquis  Code Status: Full  Diet: Regular as tolerated  Discharge Plan: To rehab once bed becomes available.  Referral sent.    Tom Keyes MD  05/06/23  09:59 EDT    Dictated utilizing Dragon dictation.

## 2023-05-06 NOTE — PLAN OF CARE
"Goal Outcome Evaluation:  Plan of Care Reviewed With: patient           Outcome Evaluation: PT evaluation completed this date with pt initially not understanding purpose of PT evaluation. Per pt she had just wokeup and thought therapist was from an outside therapy center and not hospital staff. Once this confusion resolved pt was cooperative throughout PT evaluation. Pt was oriented to self, place, and situation and her O2 sat was 92% on 4 L NC O2 with no c/o pain. Pt was modified independent with coming to sit on EOB with HOB raised. Pt needed rest of 2 min to \"catch her breath\" before [place] [facility] [place]BA and cues for safety. Pt held BSC arm rest to pivot and declined use of FWW for transfer. CGA for assist with hygiene and to return to sitting on EOB. Pt was short of breath with O2 sat at 89%. As pt sat on EOB she would lean to the R into propped pillows due to fatigue. Once back to bed pt's O2 saturation was raised to 98% and pt noticably less anxious. Pt presents with deficits in endurance, balance, and strength. She is expected to improve her functional mobility with continued PT services prior to d/c.  "

## 2023-05-06 NOTE — THERAPY EVALUATION
Patient Name: Gabi Dudley  : 1945    MRN: [id number]                              Today's Date: 2023       Admit Date: 2023    Visit Dx:     ICD-10-CM ICD-9-CM   1. COPD exacerbation  J44.1 491.21     Patient Active Problem List   Diagnosis   • Adrenal adenoma   • Anxiety   • Chronic fatigue   • Fibromyalgia   • Hyperlipidemia   • Essential hypertension   • Insomnia   • Osteoarthritis of knee   • Osteoporosis   • Pulmonary emphysema (HCC)   • Simple renal cyst   • Tension headache   • Vitamin D deficiency   • Diverticulosis   • Inversion of nipple   • Paroxysmal atrial fibrillation (HCC)   • Coronary artery disease involving native coronary artery of native heart without angina pectoris   • Autonomic dysfunction   • Gastroesophageal reflux disease without esophagitis   • Urge incontinence   • Erythrasma   • Compression fracture of T5 vertebra   • Lung nodule   • COPD exacerbation   • Overweight (BMI 25.0-29.9)   • Acute on chronic respiratory failure with hypoxia   • Acute on chronic respiratory failure with hypoxia and hypercapnia   • Iron deficiency anemia   • Impaired mobility and ADLs     Past Medical History:   Diagnosis Date   • Adrenal adenoma    • Anemia    • Arrhythmia    • Asthma    • Atrial fibrillation    • Back pain    • Benign colonic polyp    • Benign tumor of adrenal gland    • Cataract     bilateral   • Cholelithiasis    • Chronic bronchitis    • Chronic bronchitis with COPD (chronic obstructive pulmonary disease)    • COPD (chronic obstructive pulmonary disease)    • Coronary artery disease    • Depression    • Elevated cholesterol    • Environmental and seasonal allergies    • Fibromyalgia    • Gastritis    • Generalized anxiety disorder    • GERD (gastroesophageal reflux disease)    • H/O mammogram    • Hemorrhoids    • History of blood transfusion    • History of blood transfusion    • History of echocardiogram    • History of endometriosis    • History of nuclear  stress test    • Hypertension    • IBS (irritable bowel syndrome)    • Impaired functional mobility, balance, gait, and endurance    • Impaired mobility    • Inverted nipple    • Kidney disease    • Kidney stone    • Liver cyst    • Nodular radiologic density    • Nodule of left lung    • On home oxygen therapy     2 liters NC QHS   • Osteoarthritis    • Osteoporosis    • PONV (postoperative nausea and vomiting)    • Renal cyst    • Sinus problem     2014   • Sinusitis    • Skin cancer     basal cell carcinoma   • SOB (shortness of breath)    • Tobacco use    • Urinary frequency    • Vitamin D deficiency    • Wears glasses    • Wears partial dentures     upper plate     Past Surgical History:   Procedure Laterality Date   • APPENDECTOMY  1980s   • CARDIAC CATHETERIZATION  2003   • CATARACT EXTRACTION  2013    both eyes   • CHOLECYSTECTOMY WITH INTRAOPERATIVE CHOLANGIOGRAM N/A 10/30/2020    Procedure: CHOLECYSTECTOMY LAPAROSCOPIC INTRAOPERATIVE CHOLANGIOGRAPHY;  Surgeon: Sima Moses MD;  Location: [facility] OR;  Service: General;  Laterality: N/A;   • COLONOSCOPY  03/11/2013   • COLONOSCOPY  06/21/2016   • COLONOSCOPY N/A 7/24/2019    Procedure: COLONOSCOPY W/ COLD FORCEP POLYPECTOMIES; HOT SNARE POLYPECTOMIES; COLD SNARE POLYPECTOMY;  Surgeon: Goyo Nunez MD;  Location: [facility] ENDOSCOPY;  Service: Gastroenterology   • COLONOSCOPY W/ BIOPSIES AND POLYPECTOMY     • EXPLORATORY LAPAROTOMY N/A 11/23/2020    Procedure: colectomy, right, closure of enterotomy x 2, reduction of internal volvulus;  Surgeon: Sima Moses MD;  Location: [facility] OR;  Service: General;  Laterality: N/A;   • HYSTERECTOMY  1980s    partial   • LYSIS OF ABDOMINAL ADHESIONS     • TONSILLECTOMY  1987   • UPPER GASTROINTESTINAL ENDOSCOPY  01/13/2015   • VAGINAL DELIVERY      x2      General Information     Row Name 05/06/23 2825          Physical Therapy Time and Intention    Document Type evaluation  -TW     Mode of Treatment physical  therapy  -     Row Name 05/06/23 1515          General Information    Patient Profile Reviewed yes  -TW     Prior Level of Function independent:;gait;min assist:;ADL's  pt states she has rollator, FWW, shower chair, BSC, and home O2. States everything taking longer recently due to increased O2 needs and recent illnesses. Dtr and granddaughter leave in early am and are not home until late evening during the week.  -TW     Existing Precautions/Restrictions fall;oxygen therapy device and L/min  -TW     Barriers to Rehab medically complex;previous functional deficit;cognitive status  -     Row Name 05/06/23 1515          Living Environment    People in Home child(ashely), adult;grandchild(ashely)  -TW     Row Name 05/06/23 1515          Home Main Entrance    Number of Stairs, Main Entrance none;other (see comments)  -     Row Name 05/06/23 1515          Stairs Within Home, Primary    Stairs, Within Home, Primary pt has been moved to lower level of home where they have set up a mini refrigerator and small kitchen so pt does not have to do 16 steps required to get onto the main level of the home.  -TW     Number of Stairs, Within Home, Primary other (see comments)  -     Row Name 05/06/23 1515          Cognition    Orientation Status (Cognition) oriented to;person;place;situation;verbal cues/prompts needed for orientation;other (see comments)  -     Row Name 05/06/23 1515          Safety Issues, Functional Mobility    Comment, Safety Issues/Impairments (Mobility) Pt was initially in a sleep/awake state when therapist entered her room and pt did not initially understand that therapist was an employee of the hospital that would be performing her PT evaluation. Once pt understood why therapist was there pt agreed to PT evaluation.  -TW           User Key  (r) = Recorded By, (t) = Taken By, (c) = Cosigned By    Initials Name Provider Type    TW Alice Laura, PT Physical Therapist               Mobility     Row Name  05/06/23 1515          Bed Mobility    Bed Mobility supine-sit;sit-supine  -TW     Supine-Sit [place] (Bed Mobility) modified independence  -TW     Sit-Supine [place] (Bed Mobility) modified independence  -TW     Assistive Device (Bed Mobility) head of bed elevated;bed rails  -TW     Comment, (Bed Mobility) pt adjusted the HOB herself to assist her in getting sitting on the EOB. Pt needed to rest once in sitting as she became short of breath with O2 sat at 92% on 4 L NC O2  -TW     Row Name 05/06/23 1515          Transfers    Comment, (Transfers) pt declined use of FWW for transfer to [facility], stating that is how she does it.  -TW     Row Name 05/06/23 1515          Bed-Chair Transfer    Bed-Chair [place] (Transfers) standby assist;verbal cues;contact guard  -TW     Assistive Device (Bed-Chair Transfers) other (see comments)  -TW     Row Name 05/06/23 1515          Sit-Stand Transfer    Sit-Stand [place] (Transfers) standby assist  -TW     Assistive Device (Sit-Stand Transfers) other (see comments)  -     Row Name 05/06/23 1515          Gait/Stairs (Locomotion)    [place] Level (Gait) not tested  -TW     Comment, (Gait/Stairs) Pt's gait not tested this date as she became short of breath and increased anxiety with sitting on EOB as well as during transfers with extended time to recover.  -TW           User Key  (r) = Recorded By, (t) = Taken By, (c) = Cosigned By    Initials Name Provider Type    TW Alice Laura, PT Physical Therapist               Obj/Interventions     Row Name 05/06/23 1515          Range of Motion Comprehensive    Comment, General Range of Motion BLE grossly WFLs  -TW     Row Name 05/06/23 1515          Strength Comprehensive (MMT)    Comment, General Manual Muscle Testing (MMT) Assessment BLE grossly 3-5 to 4-/5  -TW     Row Name 05/06/23 1515          Balance    Balance Assessment sitting static balance;sitting dynamic balance;sit to stand dynamic balance;standing static  balance;standing dynamic balance  -TW     Static Sitting Balance standby assist  -TW     Dynamic Sitting Balance standby assist  -TW     Position, Sitting Balance unsupported;sitting edge of bed  -TW     Sit to Stand Dynamic Balance standby assist;verbal cues  -TW     Static Standing Balance standby assist  -TW     Dynamic Standing Balance standby assist  -TW     Position/Device Used, Standing Balance supported;unsupported;other (see comments)  -TW     Comment, Balance Pt would fatigue in sitting on EOB and lean to the R to rest on propped pillows. Pt demonstrated increased anxiety post [place] [facility] Pt held armrest of C to pivot and declined use of FWW.  -TW           User Key  (r) = Recorded By, (t) = Taken By, (c) = Cosigned By    Initials Name Provider Type    TW Alice Laura, PT Physical Therapist               Goals/Plan     Row Name 05/06/23 1515          Bed Mobility Goal 1 (PT)    Activity/Assistive Device (Bed Mobility Goal 1, PT) bed mobility activities, all  -TW     [place] Level/Cues Needed (Bed Mobility Goal 1, PT) independent  -TW     Time Frame (Bed Mobility Goal 1, PT) short term goal (STG);1 week  -TW     Strategies/Barriers (Bed Mobility Goal 1, PT) with bed flat and no rails  -TW     Progress/Outcomes (Bed Mobility Goal 1, PT) goal ongoing  -TW     Row Name 05/06/23 1515          Transfer Goal 1 (PT)    Activity/Assistive Device (Transfer Goal 1, PT) sit-to-stand/stand-to-sit;bed-to-chair/chair-to-bed;toilet;other (see comments)  -TW     [place] Level/Cues Needed (Transfer Goal 1, PT) modified independence  -TW     Time Frame (Transfer Goal 1, PT) short term goal (STG);2 weeks  -TW     Strategies/Barriers (Transfers Goal 1, PT) with O2 sat remaining above 90% with appropriate assistive device  -TW     Progress/Outcome (Transfer Goal 1, PT) goal ongoing  -TW     Row Name 05/06/23 1515          Gait Training Goal 1 (PT)    Activity/Assistive Device (Gait Training Goal  "1, PT) gait (walking locomotion);assistive device use;increase energy conservation;increase endurance/gait distance;walker, rolling  -TW     [place] Level (Gait Training Goal 1, PT) standby assist  -TW     Distance (Gait Training Goal 1, PT) 100 ft  -TW     Time Frame (Gait Training Goal 1, PT) 2 weeks  -TW     Strategies/Barriers (Gait Training Goal 1, PT) with O2 sat avoe 90% throughout  -TW     Progress/Outcome (Gait Training Goal 1, PT) goal ongoing  -TW     Row Name 05/06/23 1515          Problem Specific Goal 1 (PT)    Problem Specific Goal 1 (PT) pt to sit on EOB in midline with O2 sat above 90% for 4 min  -TW     Progress/Outcome (Problem Specific Goal 1, PT) goal ongoing  -TW     Row Name 05/06/23 1515          Patient Education Goal (PT)    Activity (Patient Education Goal, PT) BLE ther ex 1 x 15  -TW     [place]/Cues/Accuracy (Memory Goal 2, PT) demonstrates adequately  -TW     Time Frame (Patient Education Goal, PT) 2 weeks  -TW     Progress/Outcome (Patient Education Goal, PT) goal ongoing  -TW     Row Name 05/06/23 1515          Therapy Assessment/Plan (PT)    Planned Therapy Interventions (PT) balance training;bed mobility training;gait training;patient/family education;transfer training;strengthening  -TW           User Key  (r) = Recorded By, (t) = Taken By, (c) = Cosigned By    Initials Name Provider Type    TW Alice Laura, PT Physical Therapist               Clinical Impression     Row Name 05/06/23 1515          Pain    Pretreatment Pain Rating 0/10 - no pain  -TW     Posttreatment Pain Rating 0/10 - no pain  -TW     Pre/Posttreatment Pain Comment pt had no specific c/o pain only of being short of breath and anxious when she could not \"catch my breath\"  -TW     Pain Intervention(s) Emotional support  -TW     Row Name 05/06/23 1515          Plan of Care Review    Plan of Care Reviewed With patient  -TW     Outcome Evaluation PT evaluation completed this date with pt initially not " "understanding purpose of PT evaluation. Per pt she had just wokeup and thought therapist was from an outside therapy center and not hospital staff. Once this confusion resolved pt was cooperative throughout PT evaluation. Pt was oriented to self, place, and situation and her O2 sat was 92% on 4 L NC O2 with no c/o pain. Pt was modified independent with coming to sit on EOB with HOB raised. Pt needed rest of 2 min to \"catch her breath\" before transferring to [facility] with close SBA and cues for safety. Pt held BSC arm rest to pivot and declined use of FWW for transfer. CGA for assist with hygiene and to return to sitting on EOB. Pt was short of breath with O2 sat at 89%. As pt sat on EOB she would lean to the R into propped pillows due to fatigue. Once back to bed pt's O2 saturation was raised to 98% and pt noticably less anxious. Pt presents with deficits in endurance, balance, and strength. She is expected to improve her functional mobility with continued PT services prior to d/c.  -TW     Row Name 05/06/23 1517          Therapy Assessment/Plan (PT)    Patient/Family Therapy Goals Statement (PT) to go to rehab to improve so that I can take care of myself  -TW     Rehab Potential (PT) good, to achieve stated therapy goals  -TW     Criteria for Skilled Interventions Met (PT) yes;meets criteria  -TW     Therapy Frequency (PT) 5 times/wk  -TW     Predicted Duration of Therapy Intervention (PT) 2 wks  -TW     Row Name 05/06/23 1515          Vital Signs    Pretreatment Heart Rate (beats/min) 86  -TW     Intratreatment Heart Rate (beats/min) 89  -TW     Posttreatment Heart Rate (beats/min) 87  -TW     Pre SpO2 (%) 92  -TW     O2 Delivery Pre Treatment supplemental O2  4 L  -TW     Intra SpO2 (%) 89  -TW     O2 Delivery Intra Treatment supplemental O2  4 L  -TW     Post SpO2 (%) 98  -TW     O2 Delivery Post Treatment supplemental O2  4 L  -TW     Pre Patient Position Supine  -TW     Intra Patient Position Standing  -TW     Post " Patient Position Supine  -TW     Row Name 05/06/23 1515          Positioning and Restraints    Pre-Treatment Position in bed  -TW     Post Treatment Position bed  -TW     In Bed fowlers;call light within reach;encouraged to call for assist;exit alarm on;notified nsg;side rails up x3  -TW           User Key  (r) = Recorded By, (t) = Taken By, (c) = Cosigned By    Initials Name Provider Type    Alice Avila, HANS Physical Therapist               Outcome Measures     Row Name 05/06/23 1515 05/06/23 0800       How much help from another person do you currently need...    Turning from your back to your side while in flat bed without using bedrails? 3  -TW 3  -EW    Moving from lying on back to sitting on the side of a flat bed without bedrails? 3  -TW 3  -EW    Moving to and from a bed to a chair (including a wheelchair)? 3  -TW 3  -EW    Standing up from a chair using your arms (e.g., wheelchair, bedside chair)? 4  -TW 3  -EW    Climbing 3-5 steps with a railing? 2  -TW 3  -EW    To walk in hospital room? 3  -TW 3  -EW    AM-PAC 6 Clicks Score (PT) 18  -TW 18  -EW    Highest level of mobility 6 --> Walked 10 steps or more  -TW 6 --> Walked 10 steps or more  -EW    Row Name 05/06/23 1515          Functional Assessment    Outcome Measure Options AM-PAC 6 Clicks Basic Mobility (PT)  -TW           User Key  (r) = Recorded By, (t) = Taken By, (c) = Cosigned By    Initials Name Provider Type    Alice Avila, HANS Physical Therapist    Danita Oliveira, RN Registered Nurse                             Physical Therapy Education     Title: PT OT SLP Therapies (In Progress)     Topic: Physical Therapy (In Progress)     Point: Mobility training (Done)     Learning Progress Summary           Patient Acceptance, E, VU by KENNETH at 5/6/2023 1515    Comment: Pt education on the purpose of PT evaluation and pt's POC/goals                   Point: Home exercise program (Not Started)     Learner Progress:  Not documented in this  "visit.          Point: Body mechanics (Not Started)     Learner Progress:  Not documented in this visit.          Point: Precautions (Not Started)     Learner Progress:  Not documented in this visit.                      User Key     Initials Effective Dates Name Provider Type Discipline    TW 06/16/21 -  Alice Laura, PT Physical Therapist PT              PT Recommendation and Plan  Planned Therapy Interventions (PT): balance training, bed mobility training, gait training, patient/family education, transfer training, strengthening  Plan of Care Reviewed With: patient  Outcome Evaluation: PT evaluation completed this date with pt initially not understanding purpose of PT evaluation. Per pt she had just wokeup and thought therapist was from an outside therapy center and not hospital staff. Once this confusion resolved pt was cooperative throughout PT evaluation. Pt was oriented to self, place, and situation and her O2 sat was 92% on 4 L NC O2 with no c/o pain. Pt was modified independent with coming to sit on EOB with HOB raised. Pt needed rest of 2 min to \"catch her breath\" before transferring to [facility] with close SBA and cues for safety. Pt held BSC arm rest to pivot and declined use of FWW for transfer. CGA for assist with hygiene and to return to sitting on EOB. Pt was short of breath with O2 sat at 89%. As pt sat on EOB she would lean to the R into propped pillows due to fatigue. Once back to bed pt's O2 saturation was raised to 98% and pt noticably less anxious. Pt presents with deficits in endurance, balance, and strength. She is expected to improve her functional mobility with continued PT services prior to d/c.     Time Calculation:    PT Charges     Row Name 05/06/23 1515             Time Calculation    Stop Time 1515  -TW      PT Received On 05/06/23 -TW      PT Goal Re-Cert Due Date 05/16/23  -TW            User Key  (r) = Recorded By, (t) = Taken By, (c) = Cosigned By    Initials Name Provider Type    TW " Alice Laura, PT Physical Therapist              Therapy Charges for Today     Code Description Service Date Service Provider Modifiers Qty    16993858781 HC PT EVAL LOW COMPLEXITY 3 5/6/2023 Alice Laura, PT GP 1          PT G-Codes  Outcome Measure Options: AM-PAC 6 Clicks Basic Mobility (PT)  AM-PAC 6 Clicks Score (PT): 18  PT Discharge Summary  Anticipated Discharge Disposition (PT): inpatient rehabilitation facility    Alice Laura PT  5/6/2023

## 2023-05-06 NOTE — PLAN OF CARE
Problem: Fall Injury Risk  Goal: Absence of Fall and Fall-Related Injury  Outcome: Ongoing, Progressing  Intervention: Promote Injury-Free Environment  Recent Flowsheet Documentation  Taken 5/6/2023 0800 by Amanda Garcia RN  Safety Promotion/Fall Prevention: safety round/check completed     Problem: COPD (Chronic Obstructive Pulmonary Disease) Comorbidity  Goal: Maintenance of COPD Symptom Control  Outcome: Ongoing, Progressing     Problem: Hypertension Comorbidity  Goal: Blood Pressure in Desired Range  Outcome: Ongoing, Progressing   Goal Outcome Evaluation:

## 2023-05-06 NOTE — PLAN OF CARE
Goal Outcome Evaluation:               No acute events during shift. VSS, SA on tele. 4L nc. Pt slept through shift. No other changes in pt condition. Will continue to monitor.

## 2023-05-07 LAB
HCT VFR BLD AUTO: 26.7 % (ref 34–46.6)
HGB BLD-MCNC: 7.1 G/DL (ref 12–15.9)

## 2023-05-07 PROCEDURE — 94799 UNLISTED PULMONARY SVC/PX: CPT

## 2023-05-07 PROCEDURE — 94664 DEMO&/EVAL PT USE INHALER: CPT

## 2023-05-07 PROCEDURE — 99232 SBSQ HOSP IP/OBS MODERATE 35: CPT | Performed by: FAMILY MEDICINE

## 2023-05-07 PROCEDURE — 63710000001 PREDNISONE PER 1 MG: Performed by: INTERNAL MEDICINE

## 2023-05-07 PROCEDURE — 94761 N-INVAS EAR/PLS OXIMETRY MLT: CPT

## 2023-05-07 PROCEDURE — 85018 HEMOGLOBIN: CPT | Performed by: FAMILY MEDICINE

## 2023-05-07 PROCEDURE — 85014 HEMATOCRIT: CPT | Performed by: FAMILY MEDICINE

## 2023-05-07 PROCEDURE — 25010000002 AZITHROMYCIN PER 500 MG: Performed by: INTERNAL MEDICINE

## 2023-05-07 RX ORDER — METHOCARBAMOL 500 MG/1
500 TABLET, FILM COATED ORAL EVERY 6 HOURS PRN
Status: DISCONTINUED | OUTPATIENT
Start: 2023-05-07 | End: 2023-05-11 | Stop reason: HOSPADM

## 2023-05-07 RX ADMIN — Medication 5000 UNITS: at 08:26

## 2023-05-07 RX ADMIN — FERROUS SULFATE TAB EC 324 MG (65 MG FE EQUIVALENT) 324 MG: 324 (65 FE) TABLET DELAYED RESPONSE at 08:27

## 2023-05-07 RX ADMIN — CLONAZEPAM 1 MG: 0.5 TABLET ORAL at 17:57

## 2023-05-07 RX ADMIN — OXYCODONE HYDROCHLORIDE AND ACETAMINOPHEN 1 TABLET: 7.5; 325 TABLET ORAL at 17:57

## 2023-05-07 RX ADMIN — PREDNISONE 40 MG: 20 TABLET ORAL at 08:27

## 2023-05-07 RX ADMIN — CETIRIZINE HYDROCHLORIDE 10 MG: 10 TABLET, FILM COATED ORAL at 08:27

## 2023-05-07 RX ADMIN — ATORVASTATIN CALCIUM 20 MG: 20 TABLET, FILM COATED ORAL at 08:27

## 2023-05-07 RX ADMIN — BUDESONIDE AND FORMOTEROL FUMARATE DIHYDRATE 2 PUFF: 160; 4.5 AEROSOL RESPIRATORY (INHALATION) at 20:01

## 2023-05-07 RX ADMIN — TRAZODONE HYDROCHLORIDE 100 MG: 50 TABLET ORAL at 20:51

## 2023-05-07 RX ADMIN — Medication 10 ML: at 09:00

## 2023-05-07 RX ADMIN — APIXABAN 5 MG: 5 TABLET, FILM COATED ORAL at 17:52

## 2023-05-07 RX ADMIN — DULOXETINE 60 MG: 30 CAPSULE, DELAYED RELEASE ORAL at 08:27

## 2023-05-07 RX ADMIN — APIXABAN 5 MG: 5 TABLET, FILM COATED ORAL at 05:48

## 2023-05-07 RX ADMIN — OXYCODONE HYDROCHLORIDE AND ACETAMINOPHEN 1 TABLET: 7.5; 325 TABLET ORAL at 09:05

## 2023-05-07 RX ADMIN — TIOTROPIUM BROMIDE INHALATION SPRAY 2 PUFF: 3.12 SPRAY, METERED RESPIRATORY (INHALATION) at 07:08

## 2023-05-07 RX ADMIN — SODIUM CHLORIDE 500 MG: 900 INJECTION, SOLUTION INTRAVENOUS at 05:48

## 2023-05-07 RX ADMIN — Medication 10 ML: at 20:51

## 2023-05-07 RX ADMIN — BUDESONIDE AND FORMOTEROL FUMARATE DIHYDRATE 2 PUFF: 160; 4.5 AEROSOL RESPIRATORY (INHALATION) at 07:08

## 2023-05-07 RX ADMIN — PANTOPRAZOLE SODIUM 40 MG: 40 TABLET, DELAYED RELEASE ORAL at 08:27

## 2023-05-07 RX ADMIN — SERTRALINE 150 MG: 50 TABLET, FILM COATED ORAL at 08:26

## 2023-05-07 RX ADMIN — DILTIAZEM HYDROCHLORIDE 240 MG: 240 CAPSULE, COATED, EXTENDED RELEASE ORAL at 08:27

## 2023-05-07 NOTE — PROGRESS NOTES
[facility]    Name:  Gabi Dudley   Age:  78 y.o.  Sex:  female  :  1945  MRN:  [id number]   Visit Number:  [id number]  Admission Date:  2023  Date Of Service:  23  Primary Care Physician:  Paul Quinteros MD     LOS: 2 days :    Chief Complaint:      Shortness of breath    Subjective:    Patient was seen and examined at bedside today.  Patient was sleeping comfortably in bed when I entered the room.  Patient was easily arousable.  Patient denies any complaints today.  Reports continues to have cough that is productive however the color is lightening.  Patient denies any chest pain or shortness of breath at rest.  Was titrated down to 2 L nasal cannula.  Otherwise hemodynamically stable and afebrile.  No other acute events overnight.    Hospital Course:    78-year-old lady with COPD and chronic respiratory failure on home oxygen 3 to 4 L presents following an episode of chest tightness.  She reports a gradually progressive decline in her ability to perform her activities of daily living without dyspnea.  She reports that with any activity at all even doing the laundry or walking around the house she is becoming short of breath.  She reports that she was recently treated with some antibiotics and steroids for this without much improvement.  The episode she had today occurred with some mild activity which caused her dyspnea tachypnea and then chest tightness which radiated all over her chest and both of her arms and both of her jaws.  Of note they had attempted to do a stress test on her she reports 2 weeks ago but she was unable to complete it due to anxiety.  She also had pain in her ears during the episode.  By the time I am seeing her in the emergency department after receiving steroids and nebulizer treatments her breathing is back near her baseline where it has been for the last few weeks which is quite poor.  She continues to desaturate in the  emergency department just going to the bedside commode.  She is fearful about going home and about her inability to do the things that she needs to at home and the lack of help that she has at home.  She elects to be full code.    Pertinent findings: pH 7.34, PCO2 73.9, troponin 20 and then 17, hemoglobin 7.7, MCV 76.8, chest x-ray shows an interstitial prominence which is unchanged since her film from May 1, 2023.  EKG shows sinus rhythm with non-specific T wave changes     Review of Systems:     All systems were reviewed and negative except as mentioned in subjective, assessment and plan.    Vital Signs:    Temp:  [98.2 °F (36.8 °C)-98.6 °F (37 °C)] 98.4 °F (36.9 °C)  Heart Rate:  [68-94] 90  Resp:  [18-20] 18  BP: (114-144)/(53-69) 115/61    Intake and output:    I/O last 3 completed shifts:  In: 1590 [P.O.:840; IV Piggyback:750]  Out: 2225 [Urine:2225]  I/O this shift:  In: 240 [P.O.:240]  Out: 400 [Urine:400]    Physical Examination:    General Appearance:  Alert and cooperative.  Chronically ill-appearing.   Head:  Atraumatic and normocephalic.   Eyes: Conjunctivae and sclerae normal, no icterus. No pallor.   Throat: No oral lesions, no thrush, oral mucosa moist.   Neck: Supple, trachea midline, no thyromegaly.   Lungs:   Breath sounds heard bilaterally equally.  Bilateral diffuse expiratory wheezing, no crackles, decreased air movement bilaterally.  Nonlabored respirations.  Coughing noted.    Heart:  Normal S1 and S2, no murmur, no gallop, no rub. No JVD.   Abdomen:   Normal bowel sounds, no masses, no organomegaly. Soft, nontender, nondistended, no rebound tenderness.   Extremities: Supple, no edema, no cyanosis, no clubbing.   Skin: No bleeding or rash.   Neurologic: Alert and oriented x 3. No facial asymmetry. Moves all four limbs. No tremors.      Laboratory results:    Results from last 7 days   Lab Units 05/06/23  0618 05/05/23  0629 05/05/23  0100 05/01/23  1959   SODIUM mmol/L 143 138 142 143    POTASSIUM mmol/L 4.0 4.2 4.0 3.7   CHLORIDE mmol/L 102 96* 99 101   CO2 mmol/L 33.3* 33.1* 36.6* 34.3*   BUN mg/dL 19 22 21 19   CREATININE mg/dL 0.50* 0.60 0.74 0.69   CALCIUM mg/dL 8.5* 8.4* 8.9 8.8   BILIRUBIN mg/dL  --  <0.2 <0.2 <0.2   ALK PHOS U/L  --  86 93 91   ALT (SGPT) U/L  --  19 21 28   AST (SGOT) U/L  --  16 19 35*   GLUCOSE mg/dL 120* 174* 155* 117*     Results from last 7 days   Lab Units 05/06/23  0618 05/05/23  0629 05/05/23  0100   WBC 10*3/mm3 9.33 8.80 8.54   HEMOGLOBIN g/dL 7.2* 7.3* 7.7*   HEMATOCRIT % 27.2* 27.8* 28.5*   PLATELETS 10*3/mm3 252 245 272         Results from last 7 days   Lab Units 05/05/23  0308 05/05/23  0100 05/01/23  2307   HSTROP T ng/L 17* 20* 22*         Recent Labs     11/21/22  1027 01/23/23  0050   PHART 7.443 7.334*   SZJ4IUC 47.0* 67.6*   PO2ART 68.9* 156.0*   AAB0HRG 32.1* 36.0*   BASEEXCESS 7.1* 8.7*      I have reviewed the patient's laboratory results.    Radiology results:    No radiology results from the last 24 hrs  I have reviewed the patient's radiology reports.    Medication Review:     I have reviewed the patient's active and prn medications.     Problem List:      Acute on chronic respiratory failure with hypoxia    Coronary artery disease involving native coronary artery of native heart without angina pectoris    COPD exacerbation    Iron deficiency anemia    Impaired mobility and ADLs      Assessment:    Acute on chronic respiratory failure with hypoxia, POA  Acute COPD exacerbation, POA  Elevated troponin, likely demand ischemia, POA  Coronary artery disease  Acute on chronic iron deficiency anemia, POA  Chronic A-fib, on Eliquis  Impaired mobility and ADLs    Plan:    Acute on chronic respiratory failure with hypoxia  COPD exacerbation  - Continue supplemental oxygen to keep saturation above 90%.  Patient titrated down to her baseline oxygen requirement of 4 L through nasal cannula.  - Continue prednisone 40 mg daily.   -Continue nebulizer  treatments  -Finished azithromycin for 3 days.  -Continue supportive care.     Elevated troponin  -likely demand ischemia and chronically elevated in the settings of respiratory failure.  -Patient currently with no chest pain, low suspicion for ACS  -Troponin plateaued and trended down.    Iron deficiency anemia, chronic  -Patient was severely iron deficient in January. No obvious source of bleeding.  -Iron stores low.   -s/p IV iron.    -Started on ferrous sulfate.  -Hemoglobin at 7.1 today, will continue to monitor and transfuse as indicated to keep hemoglobin above 7.       Impaired mobility and ADLs  -Patient reports she is unhappy at home does not have the social supports she needs cannot perform her ADLs like she wants.  - assessed by PT and OT and case management, plan on going to short-term rehab.    Further orders as indicated per clinical course.    DVT Prophylaxis: On Eliquis  Code Status: Full  Diet: Regular as tolerated  Discharge Plan: To rehab once bed becomes available.  Referral sent.    Tom Keyes MD  05/07/23  09:45 EDT    Dictated utilizing Dragon dictation.

## 2023-05-07 NOTE — THERAPY TREATMENT NOTE
Pt declined physical therapy d/t just finishing with shower and back is having muscle spasms, with pt notifying nursing. Physical therapy to f/u with pt at later date

## 2023-05-07 NOTE — PLAN OF CARE
Goal Outcome Evaluation:              Problem: Fall Injury Risk  Goal: Absence of Fall and Fall-Related Injury  5/7/2023 1723 by Danita Lunsofrd RN  Outcome: Ongoing, Progressing  5/7/2023 1723 by Danita Lunsford RN  Outcome: Ongoing, Progressing     Problem: COPD (Chronic Obstructive Pulmonary Disease) Comorbidity  Goal: Maintenance of COPD Symptom Control  5/7/2023 1723 by Danita Lunsford RN  Outcome: Ongoing, Progressing  5/7/2023 1723 by Danita Lunsford RN  Outcome: Ongoing, Progressing     Problem: Hypertension Comorbidity  Goal: Blood Pressure in Desired Range  5/7/2023 1723 by Danita Lunsford RN  Outcome: Ongoing, Progressing  5/7/2023 1723 by Danita Lunsford RN  Outcome: Ongoing, Progressing     Problem: Dysrhythmia  Goal: Normalized Cardiac Rhythm  5/7/2023 1723 by Danita Lunsford RN  Outcome: Ongoing, Progressing  5/7/2023 1723 by Danita Lunsford RN  Outcome: Ongoing, Progressing     Problem: Gas Exchange Impaired  Goal: Optimal Gas Exchange  5/7/2023 1723 by Danita Lunsford RN  Outcome: Ongoing, Progressing  5/7/2023 1723 by Danita Lunsford RN  Outcome: Ongoing, Progressing

## 2023-05-07 NOTE — PLAN OF CARE
Goal Outcome Evaluation:                 No acute events during shift. VSS, SR on tele. 4L HFNC. No other changes in pt condition. Will continue to monitor.

## 2023-05-08 LAB
HCT VFR BLD AUTO: 26.7 % (ref 34–46.6)
HGB BLD-MCNC: 7.1 G/DL (ref 12–15.9)

## 2023-05-08 PROCEDURE — 94799 UNLISTED PULMONARY SVC/PX: CPT

## 2023-05-08 PROCEDURE — 85014 HEMATOCRIT: CPT | Performed by: FAMILY MEDICINE

## 2023-05-08 PROCEDURE — 99232 SBSQ HOSP IP/OBS MODERATE 35: CPT | Performed by: FAMILY MEDICINE

## 2023-05-08 PROCEDURE — 94664 DEMO&/EVAL PT USE INHALER: CPT

## 2023-05-08 PROCEDURE — 94761 N-INVAS EAR/PLS OXIMETRY MLT: CPT

## 2023-05-08 PROCEDURE — 97165 OT EVAL LOW COMPLEX 30 MIN: CPT

## 2023-05-08 PROCEDURE — 85018 HEMOGLOBIN: CPT | Performed by: FAMILY MEDICINE

## 2023-05-08 PROCEDURE — 63710000001 PREDNISONE PER 1 MG: Performed by: INTERNAL MEDICINE

## 2023-05-08 PROCEDURE — 97530 THERAPEUTIC ACTIVITIES: CPT

## 2023-05-08 PROCEDURE — 97116 GAIT TRAINING THERAPY: CPT

## 2023-05-08 RX ORDER — PREDNISONE 10 MG/1
10 TABLET ORAL DAILY
Status: DISCONTINUED | OUTPATIENT
Start: 2023-05-15 | End: 2023-05-11 | Stop reason: HOSPADM

## 2023-05-08 RX ORDER — PREDNISONE 20 MG/1
20 TABLET ORAL DAILY
Status: DISCONTINUED | OUTPATIENT
Start: 2023-05-12 | End: 2023-05-11 | Stop reason: HOSPADM

## 2023-05-08 RX ORDER — PREDNISONE 20 MG/1
40 TABLET ORAL DAILY
Status: COMPLETED | OUTPATIENT
Start: 2023-05-09 | End: 2023-05-11

## 2023-05-08 RX ADMIN — SERTRALINE 150 MG: 50 TABLET, FILM COATED ORAL at 09:07

## 2023-05-08 RX ADMIN — CLOTRIMAZOLE AND BETAMETHASONE DIPROPIONATE: 10; .5 CREAM TOPICAL at 20:57

## 2023-05-08 RX ADMIN — PREDNISONE 40 MG: 20 TABLET ORAL at 09:07

## 2023-05-08 RX ADMIN — Medication 10 ML: at 20:57

## 2023-05-08 RX ADMIN — DULOXETINE 60 MG: 30 CAPSULE, DELAYED RELEASE ORAL at 09:06

## 2023-05-08 RX ADMIN — Medication 5000 UNITS: at 09:07

## 2023-05-08 RX ADMIN — OXYCODONE HYDROCHLORIDE AND ACETAMINOPHEN 1 TABLET: 7.5; 325 TABLET ORAL at 10:23

## 2023-05-08 RX ADMIN — DILTIAZEM HYDROCHLORIDE 240 MG: 240 CAPSULE, COATED, EXTENDED RELEASE ORAL at 09:07

## 2023-05-08 RX ADMIN — APIXABAN 5 MG: 5 TABLET, FILM COATED ORAL at 05:56

## 2023-05-08 RX ADMIN — OXYCODONE HYDROCHLORIDE AND ACETAMINOPHEN 1 TABLET: 7.5; 325 TABLET ORAL at 21:02

## 2023-05-08 RX ADMIN — CALCIUM CARBONATE (ANTACID) CHEW TAB 500 MG 2 TABLET: 500 CHEW TAB at 10:26

## 2023-05-08 RX ADMIN — FLUTICASONE PROPIONATE 2 SPRAY: 50 SPRAY, METERED NASAL at 09:10

## 2023-05-08 RX ADMIN — PANTOPRAZOLE SODIUM 40 MG: 40 TABLET, DELAYED RELEASE ORAL at 09:07

## 2023-05-08 RX ADMIN — TRAZODONE HYDROCHLORIDE 100 MG: 50 TABLET ORAL at 20:56

## 2023-05-08 RX ADMIN — BUDESONIDE AND FORMOTEROL FUMARATE DIHYDRATE 2 PUFF: 160; 4.5 AEROSOL RESPIRATORY (INHALATION) at 07:29

## 2023-05-08 RX ADMIN — IPRATROPIUM BROMIDE AND ALBUTEROL SULFATE 3 ML: .5; 3 SOLUTION RESPIRATORY (INHALATION) at 21:19

## 2023-05-08 RX ADMIN — CETIRIZINE HYDROCHLORIDE 10 MG: 10 TABLET, FILM COATED ORAL at 09:07

## 2023-05-08 RX ADMIN — CLOTRIMAZOLE AND BETAMETHASONE DIPROPIONATE: 10; .5 CREAM TOPICAL at 10:26

## 2023-05-08 RX ADMIN — FERROUS SULFATE TAB EC 324 MG (65 MG FE EQUIVALENT) 324 MG: 324 (65 FE) TABLET DELAYED RESPONSE at 09:07

## 2023-05-08 RX ADMIN — BUDESONIDE AND FORMOTEROL FUMARATE DIHYDRATE 2 PUFF: 160; 4.5 AEROSOL RESPIRATORY (INHALATION) at 20:01

## 2023-05-08 RX ADMIN — APIXABAN 5 MG: 5 TABLET, FILM COATED ORAL at 17:33

## 2023-05-08 RX ADMIN — Medication 10 ML: at 09:07

## 2023-05-08 RX ADMIN — TIOTROPIUM BROMIDE INHALATION SPRAY 2 PUFF: 3.12 SPRAY, METERED RESPIRATORY (INHALATION) at 07:30

## 2023-05-08 RX ADMIN — ATORVASTATIN CALCIUM 20 MG: 20 TABLET, FILM COATED ORAL at 09:07

## 2023-05-08 RX ADMIN — CLONAZEPAM 1 MG: 0.5 TABLET ORAL at 20:56

## 2023-05-08 NOTE — PLAN OF CARE
Goal Outcome Evaluation:  Plan of Care Reviewed With: patient        Progress: improving  Outcome Evaluation: VSS; remains on 4L nc; no acute events overnight; waiting on bed at rehab

## 2023-05-08 NOTE — THERAPY TREATMENT NOTE
Patient Name: Gabi Dudley  : 1945    MRN: [id number]                              Today's Date: 2023       Admit Date: 2023    Visit Dx:     ICD-10-CM ICD-9-CM   1. COPD exacerbation  J44.1 491.21     Patient Active Problem List   Diagnosis   • Adrenal adenoma   • Anxiety   • Chronic fatigue   • Fibromyalgia   • Hyperlipidemia   • Essential hypertension   • Insomnia   • Osteoarthritis of knee   • Osteoporosis   • Pulmonary emphysema (HCC)   • Simple renal cyst   • Tension headache   • Vitamin D deficiency   • Diverticulosis   • Inversion of nipple   • Paroxysmal atrial fibrillation (HCC)   • Coronary artery disease involving native coronary artery of native heart without angina pectoris   • Autonomic dysfunction   • Gastroesophageal reflux disease without esophagitis   • Urge incontinence   • Erythrasma   • Compression fracture of T5 vertebra   • Lung nodule   • COPD exacerbation   • Overweight (BMI 25.0-29.9)   • Acute on chronic respiratory failure with hypoxia   • Acute on chronic respiratory failure with hypoxia and hypercapnia   • Iron deficiency anemia   • Impaired mobility and ADLs     Past Medical History:   Diagnosis Date   • Adrenal adenoma    • Anemia    • Arrhythmia    • Asthma    • Atrial fibrillation    • Back pain    • Benign colonic polyp    • Benign tumor of adrenal gland    • Cataract     bilateral   • Cholelithiasis    • Chronic bronchitis    • Chronic bronchitis with COPD (chronic obstructive pulmonary disease)    • COPD (chronic obstructive pulmonary disease)    • Coronary artery disease    • Depression    • Elevated cholesterol    • Environmental and seasonal allergies    • Fibromyalgia    • Gastritis    • Generalized anxiety disorder    • GERD (gastroesophageal reflux disease)    • H/O mammogram    • Hemorrhoids    • History of blood transfusion    • History of blood transfusion    • History of echocardiogram    • History of endometriosis    • History of nuclear  stress test    • Hypertension    • IBS (irritable bowel syndrome)    • Impaired functional mobility, balance, gait, and endurance    • Impaired mobility    • Inverted nipple    • Kidney disease    • Kidney stone    • Liver cyst    • Nodular radiologic density    • Nodule of left lung    • On home oxygen therapy     2 liters NC QHS   • Osteoarthritis    • Osteoporosis    • PONV (postoperative nausea and vomiting)    • Renal cyst    • Sinus problem     2014   • Sinusitis    • Skin cancer     basal cell carcinoma   • SOB (shortness of breath)    • Tobacco use    • Urinary frequency    • Vitamin D deficiency    • Wears glasses    • Wears partial dentures     upper plate     Past Surgical History:   Procedure Laterality Date   • APPENDECTOMY  1980s   • CARDIAC CATHETERIZATION  2003   • CATARACT EXTRACTION  2013    both eyes   • CHOLECYSTECTOMY WITH INTRAOPERATIVE CHOLANGIOGRAM N/A 10/30/2020    Procedure: CHOLECYSTECTOMY LAPAROSCOPIC INTRAOPERATIVE CHOLANGIOGRAPHY;  Surgeon: Sima Moses MD;  Location: [facility] OR;  Service: General;  Laterality: N/A;   • COLONOSCOPY  03/11/2013   • COLONOSCOPY  06/21/2016   • COLONOSCOPY N/A 7/24/2019    Procedure: COLONOSCOPY W/ COLD FORCEP POLYPECTOMIES; HOT SNARE POLYPECTOMIES; COLD SNARE POLYPECTOMY;  Surgeon: Goyo Nunez MD;  Location: [facility] ENDOSCOPY;  Service: Gastroenterology   • COLONOSCOPY W/ BIOPSIES AND POLYPECTOMY     • EXPLORATORY LAPAROTOMY N/A 11/23/2020    Procedure: colectomy, right, closure of enterotomy x 2, reduction of internal volvulus;  Surgeon: Sima Moses MD;  Location: [facility] OR;  Service: General;  Laterality: N/A;   • HYSTERECTOMY  1980s    partial   • LYSIS OF ABDOMINAL ADHESIONS     • TONSILLECTOMY  1987   • UPPER GASTROINTESTINAL ENDOSCOPY  01/13/2015   • VAGINAL DELIVERY      x2      General Information     Row Name 05/08/23 1526          Physical Therapy Time and Intention    Document Type therapy note (daily note)  -     Mode of Treatment  physical therapy  -RM     Row Name 05/08/23 1526          General Information    Patient Profile Reviewed yes  -RM     Existing Precautions/Restrictions fall;oxygen therapy device and L/min  4L  -RM     Row Name 05/08/23 1526          Cognition    Orientation Status (Cognition) oriented x 4  -RM     Row Name 05/08/23 1526          Safety Issues, Functional Mobility    Safety Issues Affecting Function (Mobility) safety precautions follow-through/compliance  -RM     Impairments Affecting Function (Mobility) endurance/activity tolerance;shortness of breath;strength  -RM           User Key  (r) = Recorded By, (t) = Taken By, (c) = Cosigned By    Initials Name Provider Type    Bebeto Laird, RAYRAY Physical Therapist Assistant               Mobility     Row Name 05/08/23 1528          Bed Mobility    Supine-Sit [place] (Bed Mobility) modified independence  -RM     Assistive Device (Bed Mobility) bed rails;head of bed elevated  -RM     Row Name 05/08/23 1528          Sit-Stand Transfer    Sit-Stand [place] (Transfers) standby assist  -RM     Assistive Device (Sit-Stand Transfers) walker, front-wheeled  -RM     Row Name 05/08/23 1528          Gait/Stairs (Locomotion)    [place] Level (Gait) contact guard;verbal cues  -RM     Assistive Device (Gait) walker, front-wheeled  -RM     Distance in Feet (Gait) 44' x 2 with one seated rest at midpoint  -RM     Deviations/Abnormal Patterns (Gait) gait speed decreased;base of support, narrow;stride length decreased  -RM     Bilateral Gait Deviations forward flexed posture  -RM           User Key  (r) = Recorded By, (t) = Taken By, (c) = Cosigned By    Initials Name Provider Type    Bebeto Laird PTA Physical Therapist Assistant               Obj/Interventions    No documentation.                Goals/Plan    No documentation.                Clinical Impression     Row Name 05/08/23 1529          Pain    Pretreatment Pain Rating 7/10  -RM     Posttreatment  Pain Rating 0/10 - no pain  -RM     Pain Location - back;head  -RM     Row Name 05/08/23 1529          Plan of Care Review    Plan of Care Reviewed With patient  -RM     Progress improving  -RM     Outcome Evaluation Pt R sidlying but willing to work with therapy.  Pt transfered to EOB with mod ind.  Pt performed sit to stand transfer from EOB with sba with use of rw.  Pt ambulated 44' x 2 with one seated rest d/t feeling SOA for  which symptoms resolved in approx x1-2 minutes.  Pt was noted to participate with treatment on 4L pnc and maintain O2 saturation no lower than 94%.  See flowsheet for details.  -RM     Row Name 05/08/23 1529          Vital Signs    Pre SpO2 (%) 99  -RM     O2 Delivery Pre Treatment supplemental O2  4L  -RM     Intra SpO2 (%) 94  -RM     O2 Delivery Intra Treatment supplemental O2  -RM     Post SpO2 (%) 95  -RM     O2 Delivery Post Treatment supplemental O2  -RM     Pre Patient Position Side Lying  -RM     Intra Patient Position Standing  -RM     Post Patient Position Sitting  -RM     Row Name 05/08/23 1529          Positioning and Restraints    Pre-Treatment Position in bed  -RM     Post Treatment Position chair  -RM     In Chair reclined;call light within reach;encouraged to call for assist;notified nsg  -RM           User Key  (r) = Recorded By, (t) = Taken By, (c) = Cosigned By    Initials Name Provider Type    RM Bebeto David, PTA Physical Therapist Assistant               Outcome Measures     Row Name 05/08/23 1534          How much help from another person do you currently need...    Turning from your back to your side while in flat bed without using bedrails? 4  -RM     Moving from lying on back to sitting on the side of a flat bed without bedrails? 4  -RM     Moving to and from a bed to a chair (including a wheelchair)? 3  -RM     Standing up from a chair using your arms (e.g., wheelchair, bedside chair)? 4  -RM     Climbing 3-5 steps with a railing? 2  -RM     To walk in  hospital room? 3  -RM     AM-PAC 6 Clicks Score (PT) 20  -RM     Highest level of mobility 6 --> Walked 10 steps or more  -     Row Name 05/08/23 1534 05/08/23 1233       Functional Assessment    Outcome Measure Options AM-PAC 6 Clicks Basic Mobility (PT)  -RM AM-PAC 6 Clicks Daily Activity (OT)  -SD          User Key  (r) = Recorded By, (t) = Taken By, (c) = Cosigned By    Initials Name Provider Type    RM Bebeto David, PTA Physical Therapist Assistant    Aimee Bolanos OT Occupational Therapist                             Physical Therapy Education     Title: PT OT SLP Therapies (In Progress)     Topic: Physical Therapy (In Progress)     Point: Mobility training (Done)     Learning Progress Summary           Patient Acceptance, E,TB,D, VU,NR by  at 5/8/2023 1534    Comment: pacing and pursed lip breathing with activity    Acceptance, E, VU by TW at 5/6/2023 1515    Comment: Pt education on the purpose of PT evaluation and pt's POC/goals                   Point: Home exercise program (Not Started)     Learner Progress:  Not documented in this visit.          Point: Body mechanics (Not Started)     Learner Progress:  Not documented in this visit.          Point: Precautions (Not Started)     Learner Progress:  Not documented in this visit.                      User Key     Initials Effective Dates Name Provider Type Discipline     06/16/21 -  Bebeto David PTA Physical Therapist Assistant PT     06/16/21 -  Alice Laura, PT Physical Therapist PT              PT Recommendation and Plan     Plan of Care Reviewed With: patient  Progress: improving  Outcome Evaluation: Pt R sidlying but willing to work with therapy.  Pt transfered to EOB with mod ind.  Pt performed sit to stand transfer from EOB with sba with use of rw.  Pt ambulated 44' x 2 with one seated rest d/t feeling SOA for  which symptoms resolved in approx x1-2 minutes.  Pt was noted to participate with treatment on 4L pnc and maintain  O2 saturation no lower than 94%.  See flowsheet for details.     Time Calculation:    PT Charges     Row Name 05/08/23 1535             Time Calculation    Start Time 1424  -RM      Stop Time 1500  -RM      Time Calculation (min) 36 min  -RM      PT Received On 05/08/23  -RM      PT Goal Re-Cert Due Date 05/16/23  -RM         Time Calculation- PT    Total Timed Code Minutes- PT 36 minute(s)  -RM         Timed Charges    40267 - Gait Training Minutes  25  -RM      65498 - PT Therapeutic Activity Minutes 11  -RM         Total Minutes    Timed Charges Total Minutes 36  -RM       Total Minutes 36  -RM            User Key  (r) = Recorded By, (t) = Taken By, (c) = Cosigned By    Initials Name Provider Type    Bebeto Laird, PTA Physical Therapist Assistant              Therapy Charges for Today     Code Description Service Date Service Provider Modifiers Qty    40768832151 HC GAIT TRAINING EA 15 MIN 5/8/2023 Bebeto David, PTA GP 1    93075032439 HC PT THERAPEUTIC ACT EA 15 MIN 5/8/2023 Bebeto David, PTA GP 1          PT G-Codes  Outcome Measure Options: AM-PAC 6 Clicks Basic Mobility (PT)  AM-PAC 6 Clicks Score (PT): 20  AM-PAC 6 Clicks Score (OT): 19       Bebeto David PTA  5/8/2023

## 2023-05-08 NOTE — PLAN OF CARE
Goal Outcome Evaluation:  Plan of Care Reviewed With: patient        Progress: improving  Outcome Evaluation: Pt R sidlying but willing to work with therapy.  Pt transfered to EOB with mod ind.  Pt performed sit to stand transfer from EOB with sba with use of rw.  Pt ambulated 44' x 2 with one seated rest d/t feeling SOA for  which symptoms resolved in approx x1-2 minutes.  Pt was noted to participate with treatment on 4L pnc and maintain O2 saturation no lower than 94%.  See flowsheet for details.

## 2023-05-08 NOTE — PROGRESS NOTES
[facility]    Name:  Gabi Dudley   Age:  78 y.o.  Sex:  female  :  1945  MRN:  [id number]   Visit Number:  [id number]  Admission Date:  2023  Date Of Service:  23  Primary Care Physician:  Paul Quinteros MD     LOS: 3 days :    Chief Complaint:      Shortness of breath    Subjective:    Patient was seen and examined at bedside today.  Patient was lying comfortably in bed with no distress.  Patient reports still has cough however it is improving, she has dry nose from using the oxygen otherwise no acute complaints today.  Patient has not used nasal saline yet.  Patient is pending placement for rehab.  Patient remains on 3 L through nasal cannula which is baseline for her, otherwise hemodynamically stable.    Hospital Course:    78-year-old lady with COPD and chronic respiratory failure on home oxygen 3 to 4 L presents following an episode of chest tightness.  She reports a gradually progressive decline in her ability to perform her activities of daily living without dyspnea.  She reports that with any activity at all even doing the laundry or walking around the house she is becoming short of breath.  She reports that she was recently treated with some antibiotics and steroids for this without much improvement.  The episode she had today occurred with some mild activity which caused her dyspnea tachypnea and then chest tightness which radiated all over her chest and both of her arms and both of her jaws.  Of note they had attempted to do a stress test on her she reports 2 weeks ago but she was unable to complete it due to anxiety.  She also had pain in her ears during the episode.  By the time I am seeing her in the emergency department after receiving steroids and nebulizer treatments her breathing is back near her baseline where it has been for the last few weeks which is quite poor.  She continues to desaturate in the emergency department just  going to the bedside commode.  She is fearful about going home and about her inability to do the things that she needs to at home and the lack of help that she has at home.  She elects to be full code.    Pertinent findings: pH 7.34, PCO2 73.9, troponin 20 and then 17, hemoglobin 7.7, MCV 76.8, chest x-ray shows an interstitial prominence which is unchanged since her film from May 1, 2023.  EKG shows sinus rhythm with non-specific T wave changes     Review of Systems:     All systems were reviewed and negative except as mentioned in subjective, assessment and plan.    Vital Signs:    Temp:  [97.3 °F (36.3 °C)-98.9 °F (37.2 °C)] 98.9 °F (37.2 °C)  Heart Rate:  [71-99] 77  Resp:  [18-22] 18  BP: (107-173)/(48-90) 134/63    Intake and output:    I/O last 3 completed shifts:  In: 730 [P.O.:480; IV Piggyback:250]  Out: 2200 [Urine:2200]  No intake/output data recorded.    Physical Examination:    General Appearance:  Alert and cooperative.  Chronically ill-appearing.   Head:  Atraumatic and normocephalic.   Eyes: Conjunctivae and sclerae normal, no icterus. No pallor.   Throat: No oral lesions, no thrush, oral mucosa moist.   Neck: Supple, trachea midline, no thyromegaly.   Lungs:   Breath sounds heard bilaterally equally.  Bilateral diffuse expiratory wheezing, no crackles, decreased air movement bilaterally.  Nonlabored respirations.  Coughing noted.    Heart:  Normal S1 and S2, no murmur, no gallop, no rub. No JVD.   Abdomen:   Normal bowel sounds, no masses, no organomegaly. Soft, nontender, nondistended, no rebound tenderness.   Extremities: Supple, no edema, no cyanosis, no clubbing.   Skin: No bleeding or rash.   Neurologic: Alert and oriented x 3. No facial asymmetry. Moves all four limbs. No tremors.      Laboratory results:    Results from last 7 days   Lab Units 05/06/23  0618 05/05/23  0629 05/05/23  0100 05/01/23  1959   SODIUM mmol/L 143 138 142 143   POTASSIUM mmol/L 4.0 4.2 4.0 3.7   CHLORIDE mmol/L 102  96* 99 101   CO2 mmol/L 33.3* 33.1* 36.6* 34.3*   BUN mg/dL 19 22 21 19   CREATININE mg/dL 0.50* 0.60 0.74 0.69   CALCIUM mg/dL 8.5* 8.4* 8.9 8.8   BILIRUBIN mg/dL  --  <0.2 <0.2 <0.2   ALK PHOS U/L  --  86 93 91   ALT (SGPT) U/L  --  19 21 28   AST (SGOT) U/L  --  16 19 35*   GLUCOSE mg/dL 120* 174* 155* 117*     Results from last 7 days   Lab Units 05/08/23  0528 05/07/23  1011 05/06/23  0618 05/05/23  0629 05/05/23  0100   WBC 10*3/mm3  --   --  9.33 8.80 8.54   HEMOGLOBIN g/dL 7.1* 7.1* 7.2* 7.3* 7.7*   HEMATOCRIT % 26.7* 26.7* 27.2* 27.8* 28.5*   PLATELETS 10*3/mm3  --   --  252 245 272         Results from last 7 days   Lab Units 05/05/23  0308 05/05/23  0100 05/01/23  2307   HSTROP T ng/L 17* 20* 22*         Recent Labs     11/21/22  1027 01/23/23  0050   PHART 7.443 7.334*   ACI9XML 47.0* 67.6*   PO2ART 68.9* 156.0*   RKG5QZR 32.1* 36.0*   BASEEXCESS 7.1* 8.7*      I have reviewed the patient's laboratory results.    Radiology results:    No radiology results from the last 24 hrs  I have reviewed the patient's radiology reports.    Medication Review:     I have reviewed the patient's active and prn medications.     Problem List:      Acute on chronic respiratory failure with hypoxia    Coronary artery disease involving native coronary artery of native heart without angina pectoris    COPD exacerbation    Iron deficiency anemia    Impaired mobility and ADLs      Assessment:    Acute on chronic respiratory failure with hypoxia, POA  Acute COPD exacerbation, POA  Elevated troponin, likely demand ischemia, POA  Coronary artery disease  Acute on chronic iron deficiency anemia, POA  Chronic A-fib, on Eliquis  Impaired mobility and ADLs    Plan:    Acute on chronic respiratory failure with hypoxia  COPD exacerbation  - Continue supplemental oxygen to keep saturation above 90%.  Patient titrated down to her baseline oxygen requirement of 3 to 4 L through nasal cannula.  -Will order prednisone taper with 40 mg daily  x3, then 20 mg daily x3, then 10 mg daily x3.  -Continue nebulizer treatments  -Finished azithromycin for 3 days.  -Continue supportive care.     Elevated troponin  -likely demand ischemia and chronically elevated in the settings of respiratory failure.  -Patient currently with no chest pain, low suspicion for ACS  -Troponin plateaued and trended down.    Iron deficiency anemia, chronic  -Patient was severely iron deficient in January. No obvious source of bleeding.  -Iron stores low.   -s/p IV iron.    -Started on ferrous sulfate.  -Hemoglobin stabilized at 7.1 today, will continue to monitor and transfuse as indicated to keep hemoglobin above 7.       Impaired mobility and ADLs  -Patient reports she is unhappy at home does not have the social supports she needs cannot perform her ADLs like she wants.  - assessed by PT and OT and case management, plan on going to short-term rehab.  Accepted and agreeable to [facility], pending bed availability.    Further orders as indicated per clinical course.    DVT Prophylaxis: On Eliquis  Code Status: Full  Diet: Regular as tolerated  Discharge Plan: To rehab once bed becomes available.     Tom Keyes MD  05/08/23  08:36 EDT    Dictated utilizing Dragon dictation.

## 2023-05-08 NOTE — THERAPY EVALUATION
Patient Name: Gabi Dudley  : 1945    MRN: [id number]                              Today's Date: 2023       Admit Date: 2023    Visit Dx:     ICD-10-CM ICD-9-CM   1. COPD exacerbation  J44.1 491.21     Patient Active Problem List   Diagnosis   • Adrenal adenoma   • Anxiety   • Chronic fatigue   • Fibromyalgia   • Hyperlipidemia   • Essential hypertension   • Insomnia   • Osteoarthritis of knee   • Osteoporosis   • Pulmonary emphysema (HCC)   • Simple renal cyst   • Tension headache   • Vitamin D deficiency   • Diverticulosis   • Inversion of nipple   • Paroxysmal atrial fibrillation (HCC)   • Coronary artery disease involving native coronary artery of native heart without angina pectoris   • Autonomic dysfunction   • Gastroesophageal reflux disease without esophagitis   • Urge incontinence   • Erythrasma   • Compression fracture of T5 vertebra   • Lung nodule   • COPD exacerbation   • Overweight (BMI 25.0-29.9)   • Acute on chronic respiratory failure with hypoxia   • Acute on chronic respiratory failure with hypoxia and hypercapnia   • Iron deficiency anemia   • Impaired mobility and ADLs     Past Medical History:   Diagnosis Date   • Adrenal adenoma    • Anemia    • Arrhythmia    • Asthma    • Atrial fibrillation    • Back pain    • Benign colonic polyp    • Benign tumor of adrenal gland    • Cataract     bilateral   • Cholelithiasis    • Chronic bronchitis    • Chronic bronchitis with COPD (chronic obstructive pulmonary disease)    • COPD (chronic obstructive pulmonary disease)    • Coronary artery disease    • Depression    • Elevated cholesterol    • Environmental and seasonal allergies    • Fibromyalgia    • Gastritis    • Generalized anxiety disorder    • GERD (gastroesophageal reflux disease)    • H/O mammogram    • Hemorrhoids    • History of blood transfusion    • History of blood transfusion    • History of echocardiogram    • History of endometriosis    • History of nuclear  stress test    • Hypertension    • IBS (irritable bowel syndrome)    • Impaired functional mobility, balance, gait, and endurance    • Impaired mobility    • Inverted nipple    • Kidney disease    • Kidney stone    • Liver cyst    • Nodular radiologic density    • Nodule of left lung    • On home oxygen therapy     2 liters NC QHS   • Osteoarthritis    • Osteoporosis    • PONV (postoperative nausea and vomiting)    • Renal cyst    • Sinus problem     2014   • Sinusitis    • Skin cancer     basal cell carcinoma   • SOB (shortness of breath)    • Tobacco use    • Urinary frequency    • Vitamin D deficiency    • Wears glasses    • Wears partial dentures     upper plate     Past Surgical History:   Procedure Laterality Date   • APPENDECTOMY  1980s   • CARDIAC CATHETERIZATION  2003   • CATARACT EXTRACTION  2013    both eyes   • CHOLECYSTECTOMY WITH INTRAOPERATIVE CHOLANGIOGRAM N/A 10/30/2020    Procedure: CHOLECYSTECTOMY LAPAROSCOPIC INTRAOPERATIVE CHOLANGIOGRAPHY;  Surgeon: Sima Moses MD;  Location: [facility] OR;  Service: General;  Laterality: N/A;   • COLONOSCOPY  03/11/2013   • COLONOSCOPY  06/21/2016   • COLONOSCOPY N/A 7/24/2019    Procedure: COLONOSCOPY W/ COLD FORCEP POLYPECTOMIES; HOT SNARE POLYPECTOMIES; COLD SNARE POLYPECTOMY;  Surgeon: Goyo Nunez MD;  Location: [facility] ENDOSCOPY;  Service: Gastroenterology   • COLONOSCOPY W/ BIOPSIES AND POLYPECTOMY     • EXPLORATORY LAPAROTOMY N/A 11/23/2020    Procedure: colectomy, right, closure of enterotomy x 2, reduction of internal volvulus;  Surgeon: Sima Moses MD;  Location: [facility] OR;  Service: General;  Laterality: N/A;   • HYSTERECTOMY  1980s    partial   • LYSIS OF ABDOMINAL ADHESIONS     • TONSILLECTOMY  1987   • UPPER GASTROINTESTINAL ENDOSCOPY  01/13/2015   • VAGINAL DELIVERY      x2      General Information     Row Name 05/08/23 1226          OT Time and Intention    Document Type evaluation  -SD     Mode of Treatment occupational therapy  -SD      Row Name 05/08/23 1226          General Information    Patient Profile Reviewed yes  -SD     Prior Level of Function independent:;all household mobility;ADL's  daughter assists with IADLs and bathing  -SD     Existing Precautions/Restrictions fall;oxygen therapy device and L/min  -SD     Barriers to Rehab medically complex;previous functional deficit  -SD     Row Name 05/08/23 1226          Living Environment    People in Home child(ashely), adult;grandchild(ashely)  -SD     Row Name 05/08/23 1226          Home Main Entrance    Number of Stairs, Main Entrance none  -SD     Row Name 05/08/23 1226          Stairs Within Home, Primary    Stairs, Within Home, Primary stays on lower level of home; everything is accessible  -SD     Row Name 05/08/23 1226          Cognition    Orientation Status (Cognition) oriented x 4  -SD     Row Name 05/08/23 1226          Safety Issues, Functional Mobility    Safety Issues Affecting Function (Mobility) safety precautions follow-through/compliance  -SD     Impairments Affecting Function (Mobility) endurance/activity tolerance;shortness of breath;strength  -SD           User Key  (r) = Recorded By, (t) = Taken By, (c) = Cosigned By    Initials Name Provider Type    SD Aimee Allan, OT Occupational Therapist                 Mobility/ADL's     Row Name 05/08/23 1227          Bed Mobility    Bed Mobility supine-sit  -SD     Supine-Sit [place] (Bed Mobility) modified independence  -SD     Assistive Device (Bed Mobility) bed rails;head of bed elevated  -SD     Row Name 05/08/23 1227          Transfers    Transfers sit-stand transfer  -SD     Row Name 05/08/23 1227          Sit-Stand Transfer    Sit-Stand [place] (Transfers) standby assist  -SD     Assistive Device (Sit-Stand Transfers) walker, front-wheeled  -SD     Row Name 05/08/23 1227          Functional Mobility    Functional Mobility- Ind. Level contact guard assist  -SD     Functional Mobility- Device walker, front-wheeled   -SD     Functional Mobility-Distance (Feet) 56  -SD     Functional Mobility- Safety Issues supplemental O2  -SD     [facility] Name 05/08/23 1227          Activities of Daily Living    BADL Assessment/Intervention bathing;upper body dressing;lower body dressing;grooming;feeding;toileting  -SD     [facility] Name 05/08/23 1227          Bathing Assessment/Intervention    [place] Level (Bathing) minimum assist (75% patient effort)  -SD     [facility] Name 05/08/23 1227          Upper Body Dressing Assessment/Training    [place] Level (Upper Body Dressing) standby assist  -SD     [facility] Name 05/08/23 1227          Lower Body Dressing Assessment/Training    [place] Level (Lower Body Dressing) minimum assist (75% patient effort)  -SD     [facility] Name 05/08/23 1227          Grooming Assessment/Training    [place] Level (Grooming) standby assist  -SD     Row Name 05/08/23 1227          Self-Feeding Assessment/Training    [place] Level (Feeding) supervision  -SD     Row Name 05/08/23 1227          Toileting Assessment/Training    [place] Level (Toileting) minimum assist (75% patient effort)  -SD     Assistive Devices (Toileting) commode, bedside without drop arms  -SD           User Key  (r) = Recorded By, (t) = Taken By, (c) = Cosigned By    Initials Name Provider Type    Aimee Bolanos OT Occupational Therapist               Obj/Interventions     [facility] Name 05/08/23 1229          Range of Motion Comprehensive    General Range of Motion bilateral upper extremity ROM WFL  -SD     [facility] Name 05/08/23 1229          Strength Comprehensive (MMT)    General Manual Muscle Testing (MMT) Assessment upper extremity strength deficits identified  -SD     Comment, General Manual Muscle Testing (MMT) Assessment 3+/5 - 4-/5  -SD           User Key  (r) = Recorded By, (t) = Taken By, (c) = Cosigned By    Initials Name Provider Type    Aimee Bolanos OT Occupational Therapist               Goals/Plan     [facility] Name 05/08/23 1232           Bathing Goal 1 (OT)    Activity/Device (Bathing Goal 1, OT) bathing skills, all  -SD     [place] Level/Cues Needed (Bathing Goal 1, OT) standby assist  -SD     Time Frame (Bathing Goal 1, OT) long term goal (LTG)  -SD     Progress/Outcomes (Bathing Goal 1, OT) goal ongoing  -SD     Row Name 05/08/23 1232          Dressing Goal 1 (OT)    Activity/Device (Dressing Goal 1, OT) lower body dressing  -SD     [place]/Cues Needed (Dressing Goal 1, OT) contact guard required  -SD     Time Frame (Dressing Goal 1, OT) 2 weeks  -SD     Progress/Outcome (Dressing Goal 1, OT) goal ongoing  -SD     Row Name 05/08/23 1232          Toileting Goal 1 (OT)    Activity/Device (Toileting Goal 1, OT) toileting skills, all;commode  -SD     [place] Level/Cues Needed (Toileting Goal 1, OT) contact guard required  -SD     Time Frame (Toileting Goal 1, OT) 2 weeks  -SD     Progress/Outcome (Toileting Goal 1, OT) goal ongoing  -SD     Row Name 05/08/23 1232          Strength Goal 1 (OT)    Strength Goal 1 (OT) Patient to perform UB ther ex as tolerated  -SD     Time Frame (Strength Goal 1, OT) long term goal (LTG)  -SD     Progress/Outcome (Strength Goal 1, OT) goal ongoing  -SD     Row Name 05/08/23 1232          Therapy Assessment/Plan (OT)    Planned Therapy Interventions (OT) activity tolerance training;adaptive equipment training;BADL retraining;patient/caregiver education/training;ROM/therapeutic exercise;strengthening exercise;transfer/mobility retraining  -SD           User Key  (r) = Recorded By, (t) = Taken By, (c) = Cosigned By    Initials Name Provider Type    SD Aimee Allan OT Occupational Therapist               Clinical Impression     Row Name 05/08/23 1229          Pain Assessment    Pretreatment Pain Rating 8/10  -SD     Posttreatment Pain Rating 8/10  -SD     Pain Location - back;head  -SD     Pain Intervention(s) Repositioned;Ambulation/increased activity  -SD     Row Name 05/08/23 1223           Plan of Care Review    Plan of Care Reviewed With patient  -SD     Progress no change  -SD     Outcome Evaluation OT eval completed. Patient sidelying in bed, Ox4, c/o 8/10 head and nack ache. Patient is on 3L O2 satting 95%. Patient moved to EOB with mod ind, required min A for LBD. Performed sit to stand with SBA, walked 56' using RW with CGA. Requires min A for bathing and toileting. Patient is expected to benefit from continued OT services prior to DC and wishes to go to [facility] prior to returning home.  -SD     Row Name 05/08/23 1229          Therapy Assessment/Plan (OT)    Patient/Family Therapy Goal Statement (OT) rehab  -SD     Rehab Potential (OT) good, to achieve stated therapy goals  -SD     Criteria for Skilled Therapeutic Interventions Met (OT) skilled treatment is necessary  -SD     Therapy Frequency (OT) 3 times/wk  5 times if indicated  -SD     Row Name 05/08/23 1229          Therapy Plan Review/Discharge Plan (OT)    Anticipated Discharge Disposition (OT) inpatient rehabilitation facility  -SD     Row Name 05/08/23 1229          Vital Signs    Pre SpO2 (%) 95  -SD     O2 Delivery Pre Treatment supplemental O2  -SD     O2 Delivery Intra Treatment supplemental O2  -SD     Post SpO2 (%) 95  -SD     O2 Delivery Post Treatment supplemental O2  -SD     Row Name 05/08/23 1229          Positioning and Restraints    Pre-Treatment Position in bed  -SD     Post Treatment Position chair  -SD     In Chair reclined;call light within reach;encouraged to call for assist  -SD           User Key  (r) = Recorded By, (t) = Taken By, (c) = Cosigned By    Initials Name Provider Type    Aimee Bolanos OT Occupational Therapist               Outcome Measures     Row Name 05/08/23 1233          How much help from another is currently needed...    Putting on and taking off regular lower body clothing? 3  -SD     Bathing (including washing, rinsing, and drying) 3  -SD     Toileting (which includes using toilet bed pan or  urinal) 3  -SD     Putting on and taking off regular upper body clothing 3  -SD     Taking care of personal grooming (such as brushing teeth) 3  -SD     Eating meals 4  -SD     AM-PAC 6 Clicks Score (OT) 19  -SD     Row Name 05/08/23 1233          Functional Assessment    Outcome Measure Options AM-PAC 6 Clicks Daily Activity (OT)  -SD           User Key  (r) = Recorded By, (t) = Taken By, (c) = Cosigned By    Initials Name Provider Type    SD Aimee Allan OT Occupational Therapist                Occupational Therapy Education     Title: PT OT SLP Therapies (In Progress)     Topic: Occupational Therapy (In Progress)     Point: ADL training (Done)     Description:   Instruct learner(s) on proper safety adaptation and remediation techniques during self care or transfers.   Instruct in proper use of assistive devices.              Learning Progress Summary           Patient Acceptance, E,TB, VU by SD at 5/8/2023 1233    Comment: OT POC                   Point: Home exercise program (Not Started)     Description:   Instruct learner(s) on appropriate technique for monitoring, assisting and/or progressing therapeutic exercises/activities.              Learner Progress:  Not documented in this visit.          Point: Precautions (Not Started)     Description:   Instruct learner(s) on prescribed precautions during self-care and functional transfers.              Learner Progress:  Not documented in this visit.          Point: Body mechanics (Not Started)     Description:   Instruct learner(s) on proper positioning and spine alignment during self-care, functional mobility activities and/or exercises.              Learner Progress:  Not documented in this visit.                      User Key     Initials Effective Dates Name Provider Type Discipline    SD 06/16/21 -  Aimee Allan OT Occupational Therapist OT              OT Recommendation and Plan  Planned Therapy Interventions (OT): activity tolerance training,  adaptive equipment training, BADL retraining, patient/caregiver education/training, ROM/therapeutic exercise, strengthening exercise, transfer/mobility retraining  Therapy Frequency (OT): 3 times/wk (5 times if indicated)  Plan of Care Review  Plan of Care Reviewed With: patient  Progress: no change  Outcome Evaluation: OT eval completed. Patient sidelying in bed, Ox4, c/o 8/10 head and nack ache. Patient is on 3L O2 satting 95%. Patient moved to EOB with mod ind, required min A for LBD. Performed sit to stand with SBA, walked 56' using RW with CGA. Requires min A for bathing and toileting. Patient is expected to benefit from continued OT services prior to DC and wishes to go to [facility] prior to returning home.     Time Calculation:    Time Calculation- OT     Row Name 05/08/23 1234             Time Calculation- OT    OT Start Time 1106  -SD      OT Received On 05/08/23  -SD      OT Goal Re-Cert Due Date 05/18/23  -SD         Untimed Charges    OT Eval/Re-eval Minutes 45  -SD         Total Minutes    Untimed Charges Total Minutes 45  -SD       Total Minutes 45  -SD            User Key  (r) = Recorded By, (t) = Taken By, (c) = Cosigned By    Initials Name Provider Type    SD Aimee Allan OT Occupational Therapist              Therapy Charges for Today     Code Description Service Date Service Provider Modifiers Qty    [id number]  OT EVAL LOW COMPLEXITY 3 5/8/2023 Aimee Allan OT GO 1               Aimee Allan OT  5/8/2023

## 2023-05-08 NOTE — PROGRESS NOTES
Adult Nutrition  Assessment/PES    Patient Name:  Gabi Dudley  YOB: 1945  MRN: [id number]  Admit Date:  5/5/2023    Assessment Date:  5/8/2023    Comments:    Pt continues on a regular diet, PO intake remains adequate. Currently on NC per RN documentation and flowsheets. Will continue to monitor for PO adequacy, weight, meds, labs and overall nutrition status and will F/U. Available PRN.      Reason for Assessment     Row Name 05/08/23 1336          Reason for Assessment    Reason For Assessment follow-up protocol                  Labs/Tests/Procedures/Meds     Row Name 05/08/23 1336          Labs/Procedures/Meds    Lab Results Reviewed reviewed        Medications    Pertinent Medications Reviewed reviewed, pertinent     Pertinent Medications Comments lipitor, ferrous sulfate, protonix, docusate, D3                Physical Findings     Row Name 05/08/23 1336          Physical Findings    Overall Physical Appearance normal weight for age, Jed score 19                  Nutrition Prescription Ordered     Row Name 05/08/23 1337          Nutrition Prescription PO    Current PO Diet Regular     Fluid Consistency Thin                Evaluation of Received Nutrient/Fluid Intake     Row Name 05/08/23 1338          Intake Assessment    Energy/Calorie Requirement Assessment meeting needs     Protein Requirement Assessment meeting needs        PO Evaluation    Number of Days PO Intake Evaluated 2 days     Number of Meals 3     % PO Intake 66.7                   Problem/Interventions:   Problem 1     Row Name 05/08/23 1339          Nutrition Diagnoses Problem 1    Problem 1 No Nutrition Diagnosis at this Time                      Intervention Goal     Row Name 05/08/23 1331          Intervention Goal    General Maintain nutrition;Meet nutritional needs for age/condition     PO Maintain intake;Tolerate PO;Meet estimated needs     Weight Maintain weight                Nutrition Intervention     Row Name  05/08/23 1338          Nutrition Intervention    RD/Tech Action Follow Tx progress;Encourage intake;Care plan reviewd                Nutrition Prescription     Row Name 05/08/23 1338          Other Orders    Obtain Weight Daily     Obtain Weight Ordered? No, recommended     Supplement Vitamin mineral supplement     Supplement Ordered? No, recommended     Labs K+;Phos;Mg++;Na+     Labs Ordered? No, recommended                Education/Evaluation     Row Name 05/08/23 1338          Education    Education No discharge needs identified at this time        Monitor/Evaluation    Monitor Per protocol;PO intake;Weight;Skin status;Symptoms;Pertinent labs                 Electronically signed by:  Irma Nugent RD  05/08/23 13:39 EDT

## 2023-05-08 NOTE — CASE MANAGEMENT/SOCIAL WORK
Case Management/Social Work    Patient Name:  Gabi Dudley  YOB: 1945  MRN: [id number]  Admit Date:  5/5/2023        SW met with pt at bedside regarding [facility] able to offer a bed. Pt is agreeable and would like this SW to contact daughter, Malu, to update and confirm. SW contacted Daughter who is agreeable to this placement. Per facility pt could come either Wednesday or Thursday, unsure at this time. SW updated team. Facility will let this SW know what day pt can come. Pt will need COVID test closer to d/c. SW following.     Electronically signed by:  SAGAR Willard  05/08/23 14:15 EDT

## 2023-05-08 NOTE — PLAN OF CARE
Goal Outcome Evaluation:  Plan of Care Reviewed With: patient        Progress: no change  Outcome Evaluation: OT eval completed. Patient sidelying in bed, Ox4, c/o 8/10 head and nack ache. Patient is on 3L O2 satting 95%. Patient moved to EOB with mod ind, required min A for LBD. Performed sit to stand with SBA, walked 56' using RW with CGA. Requires min A for bathing and toileting. Patient is expected to benefit from continued OT services prior to DC and wishes to go to [facility] prior to returning home.

## 2023-05-08 NOTE — PLAN OF CARE
Goal Outcome Evaluation:  Plan of Care Reviewed With: patient           Outcome Evaluation: Patient had no acute events today.

## 2023-05-09 LAB
HCT VFR BLD AUTO: 28 % (ref 34–46.6)
HGB BLD-MCNC: 7.5 G/DL (ref 12–15.9)

## 2023-05-09 PROCEDURE — 94664 DEMO&/EVAL PT USE INHALER: CPT

## 2023-05-09 PROCEDURE — 94799 UNLISTED PULMONARY SVC/PX: CPT

## 2023-05-09 PROCEDURE — 99232 SBSQ HOSP IP/OBS MODERATE 35: CPT | Performed by: FAMILY MEDICINE

## 2023-05-09 PROCEDURE — 63710000001 PREDNISONE PER 1 MG: Performed by: FAMILY MEDICINE

## 2023-05-09 PROCEDURE — 85018 HEMOGLOBIN: CPT | Performed by: FAMILY MEDICINE

## 2023-05-09 PROCEDURE — 85014 HEMATOCRIT: CPT | Performed by: FAMILY MEDICINE

## 2023-05-09 PROCEDURE — 63710000001 ONDANSETRON PER 8 MG: Performed by: INTERNAL MEDICINE

## 2023-05-09 RX ADMIN — ONDANSETRON 4 MG: 4 TABLET ORAL at 12:21

## 2023-05-09 RX ADMIN — Medication 5000 UNITS: at 08:29

## 2023-05-09 RX ADMIN — APIXABAN 5 MG: 5 TABLET, FILM COATED ORAL at 06:10

## 2023-05-09 RX ADMIN — BUDESONIDE AND FORMOTEROL FUMARATE DIHYDRATE 2 PUFF: 160; 4.5 AEROSOL RESPIRATORY (INHALATION) at 07:15

## 2023-05-09 RX ADMIN — SERTRALINE 150 MG: 50 TABLET, FILM COATED ORAL at 08:29

## 2023-05-09 RX ADMIN — TIOTROPIUM BROMIDE INHALATION SPRAY 2 PUFF: 3.12 SPRAY, METERED RESPIRATORY (INHALATION) at 07:15

## 2023-05-09 RX ADMIN — CALCIUM CARBONATE (ANTACID) CHEW TAB 500 MG 2 TABLET: 500 CHEW TAB at 12:21

## 2023-05-09 RX ADMIN — OXYCODONE HYDROCHLORIDE AND ACETAMINOPHEN 1 TABLET: 7.5; 325 TABLET ORAL at 21:08

## 2023-05-09 RX ADMIN — DILTIAZEM HYDROCHLORIDE 240 MG: 240 CAPSULE, COATED, EXTENDED RELEASE ORAL at 08:29

## 2023-05-09 RX ADMIN — Medication 10 ML: at 21:08

## 2023-05-09 RX ADMIN — CETIRIZINE HYDROCHLORIDE 10 MG: 10 TABLET, FILM COATED ORAL at 08:29

## 2023-05-09 RX ADMIN — CLONAZEPAM 1 MG: 0.5 TABLET ORAL at 21:08

## 2023-05-09 RX ADMIN — FERROUS SULFATE TAB EC 324 MG (65 MG FE EQUIVALENT) 324 MG: 324 (65 FE) TABLET DELAYED RESPONSE at 08:29

## 2023-05-09 RX ADMIN — PREDNISONE 40 MG: 20 TABLET ORAL at 08:29

## 2023-05-09 RX ADMIN — CLOTRIMAZOLE AND BETAMETHASONE DIPROPIONATE: 10; .5 CREAM TOPICAL at 08:32

## 2023-05-09 RX ADMIN — TRAZODONE HYDROCHLORIDE 100 MG: 50 TABLET ORAL at 21:08

## 2023-05-09 RX ADMIN — Medication 10 ML: at 08:33

## 2023-05-09 RX ADMIN — DULOXETINE 60 MG: 30 CAPSULE, DELAYED RELEASE ORAL at 08:29

## 2023-05-09 RX ADMIN — PANTOPRAZOLE SODIUM 40 MG: 40 TABLET, DELAYED RELEASE ORAL at 08:32

## 2023-05-09 RX ADMIN — OXYCODONE HYDROCHLORIDE AND ACETAMINOPHEN 1 TABLET: 7.5; 325 TABLET ORAL at 13:18

## 2023-05-09 RX ADMIN — FLUTICASONE PROPIONATE 2 SPRAY: 50 SPRAY, METERED NASAL at 08:32

## 2023-05-09 RX ADMIN — APIXABAN 5 MG: 5 TABLET, FILM COATED ORAL at 17:34

## 2023-05-09 RX ADMIN — BUDESONIDE AND FORMOTEROL FUMARATE DIHYDRATE 2 PUFF: 160; 4.5 AEROSOL RESPIRATORY (INHALATION) at 19:36

## 2023-05-09 RX ADMIN — ATORVASTATIN CALCIUM 20 MG: 20 TABLET, FILM COATED ORAL at 08:30

## 2023-05-09 NOTE — PLAN OF CARE
Goal Outcome Evaluation:  Plan of Care Reviewed With: patient           Outcome Evaluation: Patient had no acute events today. Patient tearful about going to rehab tomorrow.

## 2023-05-09 NOTE — PLAN OF CARE
Goal Outcome Evaluation:  Plan of Care Reviewed With: patient        Progress: improving  Outcome Evaluation: VSS; no acute events noted; plan for rehab upon dc

## 2023-05-09 NOTE — THERAPY TREATMENT NOTE
Attempted to see pt x2 today, pt declined earlier this morning as she had just gotten cleaned up.  This afternoon, pt sleeping soundly.  Will follow up with pt tomorrow.

## 2023-05-09 NOTE — PROGRESS NOTES
[facility]    [facility]    Name:  Gabi Dudley   Age:  78 y.o.  Sex:  female  :  1945  MRN:  [id number]   Visit Number:  [id number]  Admission Date:  2023  Date Of Service:  23  Primary Care Physician:  Paul Quinteros MD     LOS: 4 days :    Chief Complaint:      Shortness of breath    Subjective:    Patient was seen and examined at bedside today.  Patient sitting in the chair today and appears comfortable with no distress.  Patient is not tachypneic or labored on her breathing.  Patient reports doing well today and denies any acute complaints.  Patient pending placement for rehab. Patient remains on 3 L through nasal cannula which is baseline for her, otherwise hemodynamically stable.    Hospital Course:    78-year-old lady with COPD and chronic respiratory failure on home oxygen 3 to 4 L presents following an episode of chest tightness.  She reports a gradually progressive decline in her ability to perform her activities of daily living without dyspnea.  She reports that with any activity at all even doing the laundry or walking around the house she is becoming short of breath.  She reports that she was recently treated with some antibiotics and steroids for this without much improvement.  The episode she had today occurred with some mild activity which caused her dyspnea tachypnea and then chest tightness which radiated all over her chest and both of her arms and both of her jaws.  Of note they had attempted to do a stress test on her she reports 2 weeks ago but she was unable to complete it due to anxiety.  She also had pain in her ears during the episode.  By the time I am seeing her in the emergency department after receiving steroids and nebulizer treatments her breathing is back near her baseline where it has been for the last few weeks which is quite poor.  She continues to desaturate in the emergency department just going to the bedside commode.  She is  fearful about going home and about her inability to do the things that she needs to at home and the lack of help that she has at home.  She elects to be full code.    Pertinent findings: pH 7.34, PCO2 73.9, troponin 20 and then 17, hemoglobin 7.7, MCV 76.8, chest x-ray shows an interstitial prominence which is unchanged since her film from May 1, 2023.  EKG shows sinus rhythm with non-specific T wave changes     Review of Systems:     All systems were reviewed and negative except as mentioned in subjective, assessment and plan.    Vital Signs:    Temp:  [97.6 °F (36.4 °C)-98.3 °F (36.8 °C)] 97.6 °F (36.4 °C)  Heart Rate:  [69-89] 71  Resp:  [16-22] 16  BP: (113-161)/(52-80) 129/63    Intake and output:    I/O last 3 completed shifts:  In: 480 [P.O.:480]  Out: 1700 [Urine:1700]  No intake/output data recorded.    Physical Examination:    General Appearance:  Alert and cooperative.  Chronically ill-appearing.   Head:  Atraumatic and normocephalic.   Eyes: Conjunctivae and sclerae normal, no icterus. No pallor.   Throat: No oral lesions, no thrush, oral mucosa moist.   Neck: Supple, trachea midline, no thyromegaly.   Lungs:   Breath sounds heard bilaterally equally.  Bilateral diffuse expiratory wheezing, no crackles, decreased air movement bilaterally.  Nonlabored respirations.  Coughing noted.    Heart:  Normal S1 and S2, no murmur, no gallop, no rub. No JVD.   Abdomen:   Normal bowel sounds, no masses, no organomegaly. Soft, nontender, nondistended, no rebound tenderness.   Extremities: Supple, no edema, no cyanosis, no clubbing.   Skin: No bleeding or rash.   Neurologic: Alert and oriented x 3. No facial asymmetry. Moves all four limbs. No tremors.      Laboratory results:    Results from last 7 days   Lab Units 05/06/23  0618 05/05/23  0629 05/05/23  0100   SODIUM mmol/L 143 138 142   POTASSIUM mmol/L 4.0 4.2 4.0   CHLORIDE mmol/L 102 96* 99   CO2 mmol/L 33.3* 33.1* 36.6*   BUN mg/dL 19 22 21   CREATININE mg/dL  0.50* 0.60 0.74   CALCIUM mg/dL 8.5* 8.4* 8.9   BILIRUBIN mg/dL  --  <0.2 <0.2   ALK PHOS U/L  --  86 93   ALT (SGPT) U/L  --  19 21   AST (SGOT) U/L  --  16 19   GLUCOSE mg/dL 120* 174* 155*     Results from last 7 days   Lab Units 05/09/23  0646 05/08/23  0528 05/07/23  1011 05/06/23  0618 05/05/23  0629 05/05/23  0100   WBC 10*3/mm3  --   --   --  9.33 8.80 8.54   HEMOGLOBIN g/dL 7.5* 7.1* 7.1* 7.2* 7.3* 7.7*   HEMATOCRIT % 28.0* 26.7* 26.7* 27.2* 27.8* 28.5*   PLATELETS 10*3/mm3  --   --   --  252 245 272         Results from last 7 days   Lab Units 05/05/23  0308 05/05/23  0100   HSTROP T ng/L 17* 20*         Recent Labs     11/21/22  1027 01/23/23  0050   PHART 7.443 7.334*   [id number] 47.0* 67.6*   PO2ART 68.9* 156.0*   RPB8MWH 32.1* 36.0*   BASEEXCESS 7.1* 8.7*      I have reviewed the patient's laboratory results.    Radiology results:    No radiology results from the last 24 hrs  I have reviewed the patient's radiology reports.    Medication Review:     I have reviewed the patient's active and prn medications.     Problem List:      Acute on chronic respiratory failure with hypoxia    Coronary artery disease involving native coronary artery of native heart without angina pectoris    COPD exacerbation    Iron deficiency anemia    Impaired mobility and ADLs      Assessment:    Acute on chronic respiratory failure with hypoxia, POA  Acute COPD exacerbation, POA  Elevated troponin, likely demand ischemia, POA  Coronary artery disease  Acute on chronic iron deficiency anemia, POA  Chronic A-fib, on Eliquis  Impaired mobility and ADLs    Plan:    Acute on chronic respiratory failure with hypoxia  COPD exacerbation  - Continue supplemental oxygen to keep saturation above 90%.  Patient titrated down to her baseline oxygen requirement of 3 to 4 L through nasal cannula.  -Will order prednisone taper with 40 mg daily x3, then 20 mg daily x3, then 10 mg daily x3.  -Continue nebulizer treatments  -Finished azithromycin  for 3 days.  -Continue supportive care.     Elevated troponin  -likely demand ischemia and chronically elevated in the settings of respiratory failure.  -Patient currently with no chest pain, low suspicion for ACS  -Troponin plateaued and trended down.    Iron deficiency anemia, chronic  -Patient was severely iron deficient in January. No obvious source of bleeding.  -Iron stores low.   -s/p IV iron.    -Started on ferrous sulfate.  -Hemoglobin stabilized at 7.5 today, will continue to monitor and transfuse as indicated to keep hemoglobin above 7.       Impaired mobility and ADLs  -Patient reports she is unhappy at home does not have the social supports she needs cannot perform her ADLs like she wants.  - assessed by PT and OT and case management, plan on going to short-term rehab.  Accepted and agreeable to [facility], pending bed availability.    Further orders as indicated per clinical course.    DVT Prophylaxis: On Eliquis  Code Status: Full  Diet: Regular as tolerated  Discharge Plan: To rehab once bed becomes available.     Tom Keyes MD  05/09/23  07:51 EDT    Dictated utilizing Dragon dictation.

## 2023-05-10 LAB — SARS-COV-2 AG RESP QL IA.RAPID: NORMAL

## 2023-05-10 PROCEDURE — 94799 UNLISTED PULMONARY SVC/PX: CPT

## 2023-05-10 PROCEDURE — 63710000001 PREDNISONE PER 1 MG: Performed by: FAMILY MEDICINE

## 2023-05-10 PROCEDURE — 94760 N-INVAS EAR/PLS OXIMETRY 1: CPT

## 2023-05-10 PROCEDURE — 94761 N-INVAS EAR/PLS OXIMETRY MLT: CPT

## 2023-05-10 PROCEDURE — 99232 SBSQ HOSP IP/OBS MODERATE 35: CPT | Performed by: FAMILY MEDICINE

## 2023-05-10 PROCEDURE — 97530 THERAPEUTIC ACTIVITIES: CPT

## 2023-05-10 PROCEDURE — 94664 DEMO&/EVAL PT USE INHALER: CPT

## 2023-05-10 PROCEDURE — 87426 SARSCOV CORONAVIRUS AG IA: CPT | Performed by: FAMILY MEDICINE

## 2023-05-10 RX ADMIN — METHOCARBAMOL 500 MG: 500 TABLET ORAL at 21:24

## 2023-05-10 RX ADMIN — Medication 10 ML: at 21:24

## 2023-05-10 RX ADMIN — PREDNISONE 40 MG: 20 TABLET ORAL at 08:19

## 2023-05-10 RX ADMIN — APIXABAN 5 MG: 5 TABLET, FILM COATED ORAL at 18:01

## 2023-05-10 RX ADMIN — CETIRIZINE HYDROCHLORIDE 10 MG: 10 TABLET, FILM COATED ORAL at 08:19

## 2023-05-10 RX ADMIN — BUDESONIDE AND FORMOTEROL FUMARATE DIHYDRATE 2 PUFF: 160; 4.5 AEROSOL RESPIRATORY (INHALATION) at 07:09

## 2023-05-10 RX ADMIN — DULOXETINE 60 MG: 30 CAPSULE, DELAYED RELEASE ORAL at 08:19

## 2023-05-10 RX ADMIN — APIXABAN 5 MG: 5 TABLET, FILM COATED ORAL at 06:13

## 2023-05-10 RX ADMIN — DILTIAZEM HYDROCHLORIDE 240 MG: 240 CAPSULE, COATED, EXTENDED RELEASE ORAL at 08:19

## 2023-05-10 RX ADMIN — CLOTRIMAZOLE AND BETAMETHASONE DIPROPIONATE: 10; .5 CREAM TOPICAL at 21:37

## 2023-05-10 RX ADMIN — CLONAZEPAM 1 MG: 0.5 TABLET ORAL at 21:24

## 2023-05-10 RX ADMIN — PANTOPRAZOLE SODIUM 40 MG: 40 TABLET, DELAYED RELEASE ORAL at 08:19

## 2023-05-10 RX ADMIN — FLUTICASONE PROPIONATE 2 SPRAY: 50 SPRAY, METERED NASAL at 08:21

## 2023-05-10 RX ADMIN — OXYCODONE HYDROCHLORIDE AND ACETAMINOPHEN 1 TABLET: 7.5; 325 TABLET ORAL at 21:24

## 2023-05-10 RX ADMIN — TRAZODONE HYDROCHLORIDE 100 MG: 50 TABLET ORAL at 21:26

## 2023-05-10 RX ADMIN — ATORVASTATIN CALCIUM 20 MG: 20 TABLET, FILM COATED ORAL at 08:19

## 2023-05-10 RX ADMIN — Medication 5000 UNITS: at 08:21

## 2023-05-10 RX ADMIN — SERTRALINE 150 MG: 50 TABLET, FILM COATED ORAL at 08:19

## 2023-05-10 RX ADMIN — BUDESONIDE AND FORMOTEROL FUMARATE DIHYDRATE 2 PUFF: 160; 4.5 AEROSOL RESPIRATORY (INHALATION) at 19:56

## 2023-05-10 RX ADMIN — CLOTRIMAZOLE AND BETAMETHASONE DIPROPIONATE: 10; .5 CREAM TOPICAL at 08:22

## 2023-05-10 RX ADMIN — FERROUS SULFATE TAB EC 324 MG (65 MG FE EQUIVALENT) 324 MG: 324 (65 FE) TABLET DELAYED RESPONSE at 08:22

## 2023-05-10 RX ADMIN — Medication 5 MG: at 21:25

## 2023-05-10 RX ADMIN — OXYCODONE HYDROCHLORIDE AND ACETAMINOPHEN 1 TABLET: 7.5; 325 TABLET ORAL at 16:11

## 2023-05-10 RX ADMIN — TIOTROPIUM BROMIDE INHALATION SPRAY 2 PUFF: 3.12 SPRAY, METERED RESPIRATORY (INHALATION) at 07:09

## 2023-05-10 RX ADMIN — CALCIUM CARBONATE (ANTACID) CHEW TAB 500 MG 2 TABLET: 500 CHEW TAB at 10:15

## 2023-05-10 RX ADMIN — SENNOSIDES AND DOCUSATE SODIUM 1 TABLET: 50; 8.6 TABLET ORAL at 21:25

## 2023-05-10 RX ADMIN — Medication 10 ML: at 08:21

## 2023-05-10 NOTE — CASE MANAGEMENT/SOCIAL WORK
Case Management/Social Work    Patient Name:  Gabi Dudley  YOB: 1945  MRN: [id number]  Admit Date:  5/5/2023        LIBIA spoke with [facility] who states pt able to come to facility on Thursday 5/11/23 for STR. LIBIA updated treatment team. Pt will need COVID test.       Electronically signed by:  SAGAR Willard  05/10/23 08:24 EDT

## 2023-05-10 NOTE — THERAPY TREATMENT NOTE
Patient Name: Gabi Dudley  : 1945    MRN: [id number]                              Today's Date: 5/10/2023       Admit Date: 2023    Visit Dx:     ICD-10-CM ICD-9-CM   1. COPD exacerbation  J44.1 491.21     Patient Active Problem List   Diagnosis   • Adrenal adenoma   • Anxiety   • Chronic fatigue   • Fibromyalgia   • Hyperlipidemia   • Essential hypertension   • Insomnia   • Osteoarthritis of knee   • Osteoporosis   • Pulmonary emphysema (HCC)   • Simple renal cyst   • Tension headache   • Vitamin D deficiency   • Diverticulosis   • Inversion of nipple   • Paroxysmal atrial fibrillation (HCC)   • Coronary artery disease involving native coronary artery of native heart without angina pectoris   • Autonomic dysfunction   • Gastroesophageal reflux disease without esophagitis   • Urge incontinence   • Erythrasma   • Compression fracture of T5 vertebra   • Lung nodule   • COPD exacerbation   • Overweight (BMI 25.0-29.9)   • Acute on chronic respiratory failure with hypoxia   • Acute on chronic respiratory failure with hypoxia and hypercapnia   • Iron deficiency anemia   • Impaired mobility and ADLs     Past Medical History:   Diagnosis Date   • Adrenal adenoma    • Anemia    • Arrhythmia    • Asthma    • Atrial fibrillation    • Back pain    • Benign colonic polyp    • Benign tumor of adrenal gland    • Cataract     bilateral   • Cholelithiasis    • Chronic bronchitis    • Chronic bronchitis with COPD (chronic obstructive pulmonary disease)    • COPD (chronic obstructive pulmonary disease)    • Coronary artery disease    • Depression    • Elevated cholesterol    • Environmental and seasonal allergies    • Fibromyalgia    • Gastritis    • Generalized anxiety disorder    • GERD (gastroesophageal reflux disease)    • H/O mammogram    • Hemorrhoids    • History of blood transfusion    • History of blood transfusion    • History of echocardiogram    • History of endometriosis    • History of nuclear  stress test    • Hypertension    • IBS (irritable bowel syndrome)    • Impaired functional mobility, balance, gait, and endurance    • Impaired mobility    • Inverted nipple    • Kidney disease    • Kidney stone    • Liver cyst    • Nodular radiologic density    • Nodule of left lung    • On home oxygen therapy     2 liters NC QHS   • Osteoarthritis    • Osteoporosis    • PONV (postoperative nausea and vomiting)    • Renal cyst    • Sinus problem     2014   • Sinusitis    • Skin cancer     basal cell carcinoma   • SOB (shortness of breath)    • Tobacco use    • Urinary frequency    • Vitamin D deficiency    • Wears glasses    • Wears partial dentures     upper plate     Past Surgical History:   Procedure Laterality Date   • APPENDECTOMY  1980s   • CARDIAC CATHETERIZATION  2003   • CATARACT EXTRACTION  2013    both eyes   • CHOLECYSTECTOMY WITH INTRAOPERATIVE CHOLANGIOGRAM N/A 10/30/2020    Procedure: CHOLECYSTECTOMY LAPAROSCOPIC INTRAOPERATIVE CHOLANGIOGRAPHY;  Surgeon: Sima Moses MD;  Location: [facility] OR;  Service: General;  Laterality: N/A;   • COLONOSCOPY  03/11/2013   • COLONOSCOPY  06/21/2016   • COLONOSCOPY N/A 7/24/2019    Procedure: COLONOSCOPY W/ COLD FORCEP POLYPECTOMIES; HOT SNARE POLYPECTOMIES; COLD SNARE POLYPECTOMY;  Surgeon: Goyo Nunez MD;  Location: [facility] ENDOSCOPY;  Service: Gastroenterology   • COLONOSCOPY W/ BIOPSIES AND POLYPECTOMY     • EXPLORATORY LAPAROTOMY N/A 11/23/2020    Procedure: colectomy, right, closure of enterotomy x 2, reduction of internal volvulus;  Surgeon: Sima Moses MD;  Location: [facility] OR;  Service: General;  Laterality: N/A;   • HYSTERECTOMY  1980s    partial   • LYSIS OF ABDOMINAL ADHESIONS     • TONSILLECTOMY  1987   • UPPER GASTROINTESTINAL ENDOSCOPY  01/13/2015   • VAGINAL DELIVERY      x2      General Information     Row Name 05/10/23 1504          Physical Therapy Time and Intention    Document Type therapy note (daily note)  -MS     Mode of Treatment  physical therapy  -MS     Row Name 05/10/23 1504          General Information    Patient Profile Reviewed yes  -MS           User Key  (r) = Recorded By, (t) = Taken By, (c) = Cosigned By    Initials Name Provider Type    Farzad Arndt PT Physical Therapist               Mobility     Row Name 05/10/23 1505          Sit-Stand Transfer    Sit-Stand Bonner (Transfers) standby assist  -MS     Row Name 05/10/23 1505          Gait/Stairs (Locomotion)    Bonner Level (Gait) standby assist  -MS     Distance in Feet (Gait) 3 ft  -MS     Deviations/Abnormal Patterns (Gait) festinating/shuffling;base of support, narrow  -MS     Bilateral Gait Deviations forward flexed posture  -MS           User Key  (r) = Recorded By, (t) = Taken By, (c) = Cosigned By    Initials Name Provider Type    Farzad Arndt PT Physical Therapist               Obj/Interventions     Row Name 05/10/23 1506          Motor Skills    Motor Skills --  Pt performed ADL's at EOB, dressing, washed her face, brushing her teeth  -MS     Row Name 05/10/23 1506          Balance    Static Sitting Balance standby assist  -MS     Dynamic Sitting Balance maximum assist  -MS     Position, Sitting Balance sitting edge of bed;unsupported  -MS     Static Standing Balance standby assist  -MS     Dynamic Standing Balance standby assist  -MS     Position/Device Used, Standing Balance unsupported  -MS           User Key  (r) = Recorded By, (t) = Taken By, (c) = Cosigned By    Initials Name Provider Type    Farzad Arndt PT Physical Therapist               Goals/Plan    No documentation.                Clinical Impression     Row Name 05/10/23 1507          Pain    Pretreatment Pain Rating 0/10 - no pain  -MS     Posttreatment Pain Rating 0/10 - no pain  -MS     Row Name 05/10/23 1507          Plan of Care Review    Plan of Care Reviewed With patient  -MS     Progress improving  -MS     Outcome Evaluation Pt participated in PTx this date. Pt  sitting EOB performing ADL's. Pt able to wash her face, brush her teeth and dress her LE's with SBA. pt t/f'd too [place] [facility] [place]. Pt progressing, cont per POC  -MS     Row Name 05/10/23 1507          Therapy Assessment/Plan (PT)    Rehab Potential (PT) good, to achieve stated therapy goals  -MS     Criteria for Skilled Interventions Met (PT) yes;meets criteria  -MS     Therapy Frequency (PT) 5 times/wk  -MS     Row Name 05/10/23 1507          Vital Signs    O2 Delivery Pre Treatment supplemental O2  -MS     O2 Delivery Intra Treatment supplemental O2  -MS     O2 Delivery Post Treatment supplemental O2  -MS     Pre Patient Position Sitting  -MS     Intra Patient Position Standing  -MS     Post Patient Position Sitting  -MS     Row Name 05/10/23 1507          Positioning and Restraints    Pre-Treatment Position in bed  -MS     Post Treatment Position bed  -MS     In Bed sitting EOB;encouraged to call for assist  -MS           User Key  (r) = Recorded By, (t) = Taken By, (c) = Cosigned By    Initials Name Provider Type    Farzad Arndt, PT Physical Therapist               Outcome Measures     Row Name 05/10/23 1510 05/10/23 0835       How much help from another person do you currently need...    Turning from your back to your side while in flat bed without using bedrails? 4  -MS 4  -BM    Moving from lying on back to sitting on the side of a flat bed without bedrails? 4  -MS 4  -BM    Moving to and from a bed to a chair (including a wheelchair)? 4  -MS 3  -BM    Standing up from a chair using your arms (e.g., wheelchair, bedside chair)? 4  -MS 4  -BM    Climbing 3-5 steps with a railing? 3  -MS 2  -BM    To walk in hospital room? 3  -MS 3  -BM    AM-PAC 6 Clicks Score (PT) 22  -MS 20  -BM    Highest level of mobility 7 --> Walked 25 feet or more  -MS 6 --> Walked 10 steps or more  -BM          User Key  (r) = Recorded By, (t) = Taken By, (c) = Cosigned By    Initials Name Provider Type    MS Crowder  Farzad, PT Physical Therapist    Shonda Adams, RN Registered Nurse                             Physical Therapy Education     Title: PT OT SLP Therapies (In Progress)     Topic: Physical Therapy (In Progress)     Point: Mobility training (Done)     Learning Progress Summary           Patient Acceptance, E, VU by MS at 5/10/2023 1511    Comment: importance of mobility    Acceptance, E,TB,D, VU,NR by  at 5/8/2023 1534    Comment: pacing and pursed lip breathing with activity    Acceptance, E, VU by TW at 5/6/2023 1515    Comment: Pt education on the purpose of PT evaluation and pt's POC/goals                   Point: Home exercise program (Done)     Learning Progress Summary           Patient Acceptance, E, VU by MS at 5/10/2023 1511    Comment: importance of mobility                   Point: Body mechanics (Not Started)     Learner Progress:  Not documented in this visit.          Point: Precautions (Not Started)     Learner Progress:  Not documented in this visit.                      User Key     Initials Effective Dates Name Provider Type Discipline     06/16/21 -  Bebeto David, PTA Physical Therapist Assistant PT    TW 06/16/21 -  Alice Laura, HANS Physical Therapist PT    MS 07/01/22 -  Farzad Crowder, HANS Physical Therapist PT              PT Recommendation and Plan     Plan of Care Reviewed With: patient  Progress: improving  Outcome Evaluation: Pt participated in PTx this date. Pt sitting EOB performing ADL's. Pt able to wash her face, brush her teeth and dress her LE's with SBA. [place] [facility] [place]. Pt progressing, cont per POC     Time Calculation:    PT Charges     Row Name 05/10/23 1511             Time Calculation    Start Time 0934  -MS      Stop Time 0958  -MS      Time Calculation (min) 24 min  -MS      PT Received On 05/10/23  -MS      PT Goal Re-Cert Due Date 05/20/23  -MS         Timed Charges    51633 - PT Therapeutic Activity Minutes 24  -MS         Total Minutes     Timed Charges Total Minutes 24  -MS       Total Minutes 24  -MS            User Key  (r) = Recorded By, (t) = Taken By, (c) = Cosigned By    Initials Name Provider Type    Farzad Arndt, HANS Physical Therapist              Therapy Charges for Today     Code Description Service Date Service Provider Modifiers Qty    [id number]  PT THERAPEUTIC ACT EA 15 MIN 5/10/2023 Frazad Crowder PT GP 2          PT G-Codes  Outcome Measure Options: AM-PAC 6 Clicks Basic Mobility (PT)  AM-PAC 6 Clicks Score (PT): 22  AM-PAC 6 Clicks Score (OT): 19  PT Discharge Summary  Anticipated Discharge Disposition (PT): inpatient rehabilitation facility    Farzad Crowder PT  5/10/2023

## 2023-05-10 NOTE — PLAN OF CARE
Goal Outcome Evaluation:  Plan of Care Reviewed With: patient        Progress: improving  Outcome Evaluation: VSS; no acute events noted; waiting for rehab bed

## 2023-05-10 NOTE — THERAPY TREATMENT NOTE
Pt sleeping this morning, pt worked with PT earlier and changed her underwear, pants and washed herself up.  OT will follow up with pt another time.

## 2023-05-10 NOTE — PLAN OF CARE
Goal Outcome Evaluation:  Plan of Care Reviewed With: patient        Progress: improving  Outcome Evaluation: Pt participated in PTx this date. Pt sitting EOB performing ADL's. Pt able to wash her face, brush her teeth and dress her LE's with SBA. pt t/f'd too and from [facility] with SBA. Pt progressing, cont per POC

## 2023-05-10 NOTE — PROGRESS NOTES
"    [facility]    PROGRESS NOTE    Name:  Gabi Dudley   Age:  78 y.o.  Sex:  female  :  1945  MRN:  [id number]   Visit Number:  [id number]  Admission Date:  2023  Date Of Service:  05/10/23  Primary Care Physician:  Paul Quinteros MD     LOS: 5 days :    Chief Complaint:      Shortness of breath    Subjective:    Patient was seen and examined at bedside today.  Patient laying comfortably in bed and sleeping when I entered the room.  Patient woke up and reported that she has been doing much better on her breathing today and reports that today \"has been the best\".  She denies any other acute complaints today.  No acute events overnight.  Patient remains on 2 L through nasal cannula which is baseline for her, otherwise hemodynamically stable.  Pending placement for rehab.    Hospital Course:    78-year-old lady with COPD and chronic respiratory failure on home oxygen 3 to 4 L presents following an episode of chest tightness.  She reports a gradually progressive decline in her ability to perform her activities of daily living without dyspnea.  She reports that with any activity at all even doing the laundry or walking around the house she is becoming short of breath.  She reports that she was recently treated with some antibiotics and steroids for this without much improvement.  The episode she had today occurred with some mild activity which caused her dyspnea tachypnea and then chest tightness which radiated all over her chest and both of her arms and both of her jaws.  Of note they had attempted to do a stress test on her she reports 2 weeks ago but she was unable to complete it due to anxiety.  She also had pain in her ears during the episode.  By the time I am seeing her in the emergency department after receiving steroids and nebulizer treatments her breathing is back near her baseline where it has been for the last few weeks which is quite poor.  She continues to " desaturate in the emergency department just going to the bedside commode.  She is fearful about going home and about her inability to do the things that she needs to at home and the lack of help that she has at home.  She elects to be full code.    Pertinent findings: pH 7.34, PCO2 73.9, troponin 20 and then 17, hemoglobin 7.7, MCV 76.8, chest x-ray shows an interstitial prominence which is unchanged since her film from May 1, 2023.  EKG shows sinus rhythm with non-specific T wave changes     Review of Systems:     All systems were reviewed and negative except as mentioned in subjective, assessment and plan.    Vital Signs:    Temp:  [97.3 °F (36.3 °C)-98.4 °F (36.9 °C)] 97.8 °F (36.6 °C)  Heart Rate:  [] 73  Resp:  [16-19] 16  BP: (103-130)/(55-86) 125/65    Intake and output:    I/O last 3 completed shifts:  In: 120 [P.O.:120]  Out: 1600 [Urine:1600]  I/O this shift:  In: 240 [P.O.:240]  Out: -     Physical Examination:    General Appearance:  Alert and cooperative.  Chronically ill-appearing.   Head:  Atraumatic and normocephalic.   Eyes: Conjunctivae and sclerae normal, no icterus. No pallor.   Throat: No oral lesions, no thrush, oral mucosa moist.   Neck: Supple, trachea midline, no thyromegaly.   Lungs:   Breath sounds heard bilaterally equally.  Bilateral diffuse expiratory wheezing, no crackles, decreased air movement bilaterally.  Nonlabored respirations.  Coughing noted.    Heart:  Normal S1 and S2, no murmur, no gallop, no rub. No JVD.   Abdomen:   Normal bowel sounds, no masses, no organomegaly. Soft, nontender, nondistended, no rebound tenderness.   Extremities: Supple, no edema, no cyanosis, no clubbing.   Skin: No bleeding or rash.   Neurologic: Alert and oriented x 3. No facial asymmetry. Moves all four limbs. No tremors.      Laboratory results:    Results from last 7 days   Lab Units 05/06/23  0618 05/05/23  0629 05/05/23  0100   SODIUM mmol/L 143 138 142   POTASSIUM mmol/L 4.0 4.2 4.0    CHLORIDE mmol/L 102 96* 99   CO2 mmol/L 33.3* 33.1* 36.6*   BUN mg/dL 19 22 21   CREATININE mg/dL 0.50* 0.60 0.74   CALCIUM mg/dL 8.5* 8.4* 8.9   BILIRUBIN mg/dL  --  <0.2 <0.2   ALK PHOS U/L  --  86 93   ALT (SGPT) U/L  --  19 21   AST (SGOT) U/L  --  16 19   GLUCOSE mg/dL 120* 174* 155*     Results from last 7 days   Lab Units 05/09/23  0646 05/08/23  0528 05/07/23  1011 05/06/23  0618 05/05/23  0629 05/05/23  0100   WBC 10*3/mm3  --   --   --  9.33 8.80 8.54   HEMOGLOBIN g/dL 7.5* 7.1* 7.1* 7.2* 7.3* 7.7*   HEMATOCRIT % 28.0* 26.7* 26.7* 27.2* 27.8* 28.5*   PLATELETS 10*3/mm3  --   --   --  252 245 272         Results from last 7 days   Lab Units 05/05/23  0308 05/05/23  0100   HSTROP T ng/L 17* 20*         Recent Labs     11/21/22  1027 01/23/23  0050   PHART 7.443 7.334*   MPH8GRW 47.0* 67.6*   PO2ART 68.9* 156.0*   MPP7UPE 32.1* 36.0*   BASEEXCESS 7.1* 8.7*      I have reviewed the patient's laboratory results.    Radiology results:    No radiology results from the last 24 hrs  I have reviewed the patient's radiology reports.    Medication Review:     I have reviewed the patient's active and prn medications.     Problem List:      Acute on chronic respiratory failure with hypoxia    Coronary artery disease involving native coronary artery of native heart without angina pectoris    COPD exacerbation    Iron deficiency anemia    Impaired mobility and ADLs      Assessment:    Acute on chronic respiratory failure with hypoxia, POA  Acute COPD exacerbation, POA  Elevated troponin, likely demand ischemia, POA  Coronary artery disease  Acute on chronic iron deficiency anemia, POA  Chronic A-fib, on Eliquis  Impaired mobility and ADLs    Plan:    Acute on chronic respiratory failure with hypoxia  COPD exacerbation  - Continue supplemental oxygen to keep saturation above 90%.  Patient titrated down to her baseline oxygen requirement of 3 to 4 L through nasal cannula.  -Will order prednisone taper with 40 mg daily x3,  then 20 mg daily x3, then 10 mg daily x3.  -Continue nebulizer treatments  -Finished azithromycin for 3 days.  -Continue supportive care.     Elevated troponin  -likely demand ischemia and chronically elevated in the settings of respiratory failure.  -Patient currently with no chest pain, low suspicion for ACS  -Troponin plateaued and trended down.    Iron deficiency anemia, chronic  -Patient was severely iron deficient in January. No obvious source of bleeding.  -Iron stores low.   -s/p IV iron.    -Started on ferrous sulfate.  -Hemoglobin stabilized, will continue to monitor and transfuse as indicated to keep hemoglobin above 7.       Impaired mobility and ADLs  -Patient reports she is unhappy at home does not have the social supports she needs cannot perform her ADLs like she wants.  - assessed by PT and OT and case management, plan on going to short-term rehab.  Accepted and agreeable to [facility], pending bed availability.    Further orders as indicated per clinical course.    DVT Prophylaxis: On Eliquis  Code Status: Full  Diet: Regular as tolerated  Discharge Plan: To rehab once bed becomes available.  Hopefully will have bed in a.    Tom Keyes MD  05/10/23  11:00 EDT    Dictated utilizing Dragon dictation.

## 2023-05-11 VITALS
OXYGEN SATURATION: 100 % | RESPIRATION RATE: 18 BRPM | TEMPERATURE: 98.6 F | SYSTOLIC BLOOD PRESSURE: 113 MMHG | HEIGHT: 65 IN | DIASTOLIC BLOOD PRESSURE: 65 MMHG | WEIGHT: 166.67 LBS | BODY MASS INDEX: 27.77 KG/M2 | HEART RATE: 67 BPM

## 2023-05-11 DIAGNOSIS — J96.22 ACUTE ON CHRONIC RESPIRATORY FAILURE WITH HYPOXIA AND HYPERCAPNIA: ICD-10-CM

## 2023-05-11 DIAGNOSIS — J44.1 COPD EXACERBATION: ICD-10-CM

## 2023-05-11 DIAGNOSIS — F41.9 ANXIETY: ICD-10-CM

## 2023-05-11 DIAGNOSIS — J96.21 ACUTE ON CHRONIC RESPIRATORY FAILURE WITH HYPOXIA AND HYPERCAPNIA: ICD-10-CM

## 2023-05-11 LAB — SARS-COV-2 RNA RESP QL NAA+PROBE: NOT DETECTED

## 2023-05-11 PROCEDURE — 63710000001 PREDNISONE PER 1 MG: Performed by: FAMILY MEDICINE

## 2023-05-11 PROCEDURE — 87635 SARS-COV-2 COVID-19 AMP PRB: CPT | Performed by: FAMILY MEDICINE

## 2023-05-11 PROCEDURE — 94799 UNLISTED PULMONARY SVC/PX: CPT

## 2023-05-11 PROCEDURE — 94761 N-INVAS EAR/PLS OXIMETRY MLT: CPT

## 2023-05-11 PROCEDURE — 99238 HOSP IP/OBS DSCHRG MGMT 30/<: CPT | Performed by: FAMILY MEDICINE

## 2023-05-11 PROCEDURE — 94664 DEMO&/EVAL PT USE INHALER: CPT

## 2023-05-11 RX ORDER — ECHINACEA PURPUREA EXTRACT 125 MG
2 TABLET ORAL AS NEEDED
Refills: 12
Start: 2023-05-11

## 2023-05-11 RX ORDER — CLONAZEPAM 1 MG/1
1 TABLET ORAL DAILY PRN
Qty: 3 TABLET | Refills: 0 | Status: SHIPPED | OUTPATIENT
Start: 2023-05-11 | End: 2023-05-11 | Stop reason: SDUPTHER

## 2023-05-11 RX ORDER — FERROUS SULFATE TAB EC 324 MG (65 MG FE EQUIVALENT) 324 (65 FE) MG
324 TABLET DELAYED RESPONSE ORAL
Qty: 30 TABLET
Start: 2023-05-12

## 2023-05-11 RX ORDER — GUAIFENESIN/DEXTROMETHORPHAN 100-10MG/5
10 SYRUP ORAL EVERY 6 HOURS PRN
Start: 2023-05-11 | End: 2023-06-02

## 2023-05-11 RX ORDER — PREDNISONE 20 MG/1
40 TABLET ORAL DAILY
Qty: 7 TABLET | Refills: 0 | Status: SHIPPED | OUTPATIENT
Start: 2023-05-11 | End: 2023-05-12

## 2023-05-11 RX ORDER — PREDNISONE 10 MG/1
10 TABLET ORAL DAILY
Qty: 3 TABLET | Refills: 0 | Status: SHIPPED | OUTPATIENT
Start: 2023-05-15 | End: 2023-05-11

## 2023-05-11 RX ORDER — CLONAZEPAM 1 MG/1
1 TABLET ORAL DAILY PRN
Qty: 30 TABLET | Refills: 3 | Status: SHIPPED | OUTPATIENT
Start: 2023-05-11 | End: 2023-05-12 | Stop reason: SDUPTHER

## 2023-05-11 RX ORDER — OXYCODONE AND ACETAMINOPHEN 7.5; 325 MG/1; MG/1
1 TABLET ORAL EVERY 4 HOURS PRN
Qty: 180 TABLET | Refills: 0 | Status: SHIPPED | OUTPATIENT
Start: 2023-05-11

## 2023-05-11 RX ORDER — PREDNISONE 20 MG/1
20 TABLET ORAL DAILY
Qty: 3 TABLET | Refills: 0 | Status: SHIPPED | OUTPATIENT
Start: 2023-05-12 | End: 2023-05-11

## 2023-05-11 RX ORDER — OXYCODONE AND ACETAMINOPHEN 7.5; 325 MG/1; MG/1
1 TABLET ORAL EVERY 8 HOURS PRN
Qty: 9 TABLET | Refills: 0 | Status: SHIPPED | OUTPATIENT
Start: 2023-05-11 | End: 2023-05-11 | Stop reason: SDUPTHER

## 2023-05-11 RX ADMIN — ATORVASTATIN CALCIUM 20 MG: 20 TABLET, FILM COATED ORAL at 09:45

## 2023-05-11 RX ADMIN — APIXABAN 5 MG: 5 TABLET, FILM COATED ORAL at 05:27

## 2023-05-11 RX ADMIN — DILTIAZEM HYDROCHLORIDE 240 MG: 240 CAPSULE, COATED, EXTENDED RELEASE ORAL at 09:44

## 2023-05-11 RX ADMIN — OXYCODONE HYDROCHLORIDE AND ACETAMINOPHEN 1 TABLET: 7.5; 325 TABLET ORAL at 09:54

## 2023-05-11 RX ADMIN — Medication 10 ML: at 09:45

## 2023-05-11 RX ADMIN — BUDESONIDE AND FORMOTEROL FUMARATE DIHYDRATE 2 PUFF: 160; 4.5 AEROSOL RESPIRATORY (INHALATION) at 07:05

## 2023-05-11 RX ADMIN — FERROUS SULFATE TAB EC 324 MG (65 MG FE EQUIVALENT) 324 MG: 324 (65 FE) TABLET DELAYED RESPONSE at 09:45

## 2023-05-11 RX ADMIN — SERTRALINE 150 MG: 50 TABLET, FILM COATED ORAL at 09:43

## 2023-05-11 RX ADMIN — OXYCODONE HYDROCHLORIDE AND ACETAMINOPHEN 1 TABLET: 7.5; 325 TABLET ORAL at 05:38

## 2023-05-11 RX ADMIN — CETIRIZINE HYDROCHLORIDE 10 MG: 10 TABLET, FILM COATED ORAL at 09:43

## 2023-05-11 RX ADMIN — DULOXETINE 60 MG: 30 CAPSULE, DELAYED RELEASE ORAL at 09:43

## 2023-05-11 RX ADMIN — METHOCARBAMOL 500 MG: 500 TABLET ORAL at 09:54

## 2023-05-11 RX ADMIN — CLOTRIMAZOLE AND BETAMETHASONE DIPROPIONATE: 10; .5 CREAM TOPICAL at 10:32

## 2023-05-11 RX ADMIN — TIOTROPIUM BROMIDE INHALATION SPRAY 2 PUFF: 3.12 SPRAY, METERED RESPIRATORY (INHALATION) at 07:05

## 2023-05-11 RX ADMIN — FLUTICASONE PROPIONATE 2 SPRAY: 50 SPRAY, METERED NASAL at 09:45

## 2023-05-11 RX ADMIN — Medication 5000 UNITS: at 09:44

## 2023-05-11 RX ADMIN — PANTOPRAZOLE SODIUM 40 MG: 40 TABLET, DELAYED RELEASE ORAL at 09:43

## 2023-05-11 RX ADMIN — PREDNISONE 40 MG: 20 TABLET ORAL at 09:42

## 2023-05-11 NOTE — TELEPHONE ENCOUNTER
(NEW ADMIT)    [facility]ENE REQUESTING MED REFILL FOR CLONAZEPAM 1 MG AND PERCOCET 7.5-325 MG.    DIRECTIONS: CLONAZEPAM 1 MG, 1 TAB PO QD PRN.    PERCOCET 7.5-325 MG 1 TAB PO Q 4 HRS PRN.

## 2023-05-11 NOTE — DISCHARGE INSTRUCTIONS
-Continue steroids as prescribed, avoid stopping steroids suddenly.  -Follow-up with your primary care provider in 2 to 3 days for recheck and continued care.  -Follow-up with pulmonology as an outpatient as previously.

## 2023-05-11 NOTE — PLAN OF CARE
Goal Outcome Evaluation:           Progress: improving  Outcome Evaluation: VSS on 3L O2 via humidified nasal cannula (3L O2 baseline).  Alert and oriented x4.  Complaints of pain and  muscle spasm controlled with PRN medication (see MAR).  SR with rare PVC noted on telemetry.  Up to bedside commode; tolerated well.  No acute events noted during shift.  Possible discharge today.  Will continue to monitor patient.

## 2023-05-11 NOTE — CASE MANAGEMENT/SOCIAL WORK
Case Management Discharge Note      Final Note: Pt. will be discharging to [facility] today via EMS.    Provided Post Acute Provider List?: Yes  Post Acute Provider List: Inpatient Rehab  Provided Post Acute Provider Quality & Resource List?: Yes  Post Acute Provider Quality and Resource List: Inpatient Rehab  Delivered To: Patient, Support Person  Support Person: Malu Aguilar  Method of Delivery: Telephone    Selected Continued Care - Admitted Since 5/5/2023     Destination Coordination complete.    Service Provider Selected Services Address Phone Fax Patient Preferred    [facility] [address] [facility] [id number] [phone] --          Durable Medical Equipment Coordination complete.    Service Provider Selected Services Address Phone Fax Patient Preferred    [facility] -- [phone] --       Internal Comment last updated by Malu Holden, APRN 5/5/2023 1518    Supplies pt NIV               [facility] [address] [place], [place] [address] DR ZAMORA [address] [place] [id number] [phone] --       Internal Comment last updated by Malu Holden, APRN 5/5/2023 1519    Supplies pt O2                     Dialysis/Infusion    No services have been selected for the patient.              Home Medical Care    No services have been selected for the patient.              Therapy    No services have been selected for the patient.              Community Resources    No services have been selected for the patient.              Community & DME    No services have been selected for the patient.                Selected Continued Care - Episodes Includes continued care and service providers with selected services from the active episodes listed below    High Risk Care Management Episode start date: 4/3/2023   There are no active outsourced providers for this episode.                Transportation Services  Private: Car  Ambulance: [facility]    Final Discharge Disposition Code: 03 - skilled nursing facility (SNF)

## 2023-05-11 NOTE — DISCHARGE SUMMARY
[facility]   DISCHARGE SUMMARY      Name:  Gabi Dudley   Age:  78 y.o.  Sex:  female  :  1945  MRN:  [id number]   Visit Number:  [id number]    Admission Date:  2023  Date of Discharge:  2023  Primary Care Physician:  Paul Quinteros MD    Important issues to note:    -Patient will be continued on steroids taper.  -Patient to continue ferrous sulfate and follow-up with her PCP to check on her levels and continued care.  -Follow-up with pulmonology as an outpatient as previously.  -Patient is stable for discharge to [place] for rehab.    Discharge Diagnoses:     Acute on chronic respiratory failure with hypoxia, POA, improved  Acute COPD exacerbation, POA, improved  Elevated troponin, likely demand ischemia, POA  Coronary artery disease  Acute on chronic iron deficiency anemia, POA  Chronic A-fib, on Eliquis  Impaired mobility and ADLs       Problem List:     Active Hospital Problems    Diagnosis  POA   • **Acute on chronic respiratory failure with hypoxia [J96.21]  Yes   • Impaired mobility and ADLs [Z74.09, Z78.9]  Yes   • Iron deficiency anemia [D50.9]  Yes   • COPD exacerbation [J44.1]  Yes   • Coronary artery disease involving native coronary artery of native heart without angina pectoris [I25.10]  Yes      Resolved Hospital Problems   No resolved problems to display.     Presenting Problem:    Chief Complaint   Patient presents with   • Chest Pain   • Shortness of Breath      Consults:     Consulting Physician(s)             None          Procedures Performed:        History of presenting illness/Hospital Course:    78-year-old lady with COPD and chronic respiratory failure on home oxygen 3 to 4 L presents following an episode of chest tightness.  She reports a gradually progressive decline in her ability to perform her activities of daily living without dyspnea.  She reports that with any activity at all even doing the laundry or walking around the house she is  becoming short of breath.  She reports that she was recently treated with some antibiotics and steroids for this without much improvement.  The episode she had today occurred with some mild activity which caused her dyspnea tachypnea and then chest tightness which radiated all over her chest and both of her arms and both of her jaws.  Of note they had attempted to do a stress test on her she reports 2 weeks ago but she was unable to complete it due to anxiety.  She also had pain in her ears during the episode.  By the time I am seeing her in the emergency department after receiving steroids and nebulizer treatments her breathing is back near her baseline where it has been for the last few weeks which is quite poor.  She continues to desaturate in the emergency department just going to the bedside commode.  She is fearful about going home and about her inability to do the things that she needs to at home and the lack of help that she has at home.  She elects to be full code.    Pertinent findings: pH 7.34, PCO2 73.9, troponin 20 and then 17, hemoglobin 7.7, MCV 76.8, chest x-ray shows an interstitial prominence which is unchanged since her film from May 1, 2023.  EKG shows sinus rhythm with non-specific T wave changes.     Acute on chronic respiratory failure with hypoxia  COPD exacerbation  - Continue supplemental oxygen to keep saturation above 90%.  Patient titrated down to her baseline oxygen requirement of 3 to 4 L through nasal cannula.  -on prednisone taper with 40 mg daily x3, then 20 mg daily x3, then 10 mg daily x3.  -Continue nebulizer treatments  -Finished azithromycin for 3 days.  -Continue supportive care.     Iron deficiency anemia, chronic  -Patient was severely iron deficient in January. No obvious source of bleeding.  -Iron stores low.   -s/p IV iron.    -Started on ferrous sulfate.  -Hemoglobin stabilized, will continue to monitor and transfuse as indicated to keep hemoglobin above 7.   -To  follow-up with PCP on discharge.      Impaired mobility and ADLs  -Patient reports she is unhappy at home does not have the social supports she needs cannot perform her ADLs like she wants.  - assessed by PT and OT and case management, plan on going to short-term rehab.  Accepted and agreeable to [facility].    Patient was seen and examined on day of discharge.  Patient sitting on the side of the bed, brushing her teeth and appears comfortable with no distress.  Patient unlabored and nontachypneic.  Patient reports feeling better today and is ready for discharge to rehab.  Patient denies any acute complaints this morning.  Instructed on management and plan.  Patient verbalized understanding and agreeable plan.  All questions were answered to her satisfaction.      Vital Signs:    Temp:  [97.7 °F (36.5 °C)-98.6 °F (37 °C)] 98.6 °F (37 °C)  Heart Rate:  [] 67  Resp:  [18-22] 18  BP: ()/(53-65) 113/65    Physical Exam:    General Appearance:  Alert and cooperative.  Chronically ill-appearing.   Head:  Atraumatic and normocephalic.   Eyes: Conjunctivae and sclerae normal, no icterus. No pallor.   Ears:  Ears with no abnormalities noted.   Throat: No oral lesions, no thrush, oral mucosa moist.   Neck: Supple, trachea midline, no thyromegaly.   Back:   No kyphoscoliosis present. No tenderness to palpation.   Lungs:   Breath sounds heard bilaterally equally.  No crackles.  Mild expiratory wheezing improving.  Prolonged expiration.  Nonlabored.  On supplemental oxygen.   Heart:  Normal S1 and S2, no murmur, no gallop, no rub. No JVD.   Abdomen:   Normal bowel sounds, no masses, no organomegaly. Soft, nontender, nondistended, no rebound tenderness.   Extremities: Supple, no edema, no cyanosis, no clubbing.   Pulses: Pulses palpable bilaterally.   Skin: No bleeding or rash.   Neurologic: Alert and oriented x 3. No facial asymmetry. Moves all four limbs. No tremors.     Pertinent Lab Results:     Results from last 7  days   Lab Units 05/06/23  0618 05/05/23  0629 05/05/23  0100   SODIUM mmol/L 143 138 142   POTASSIUM mmol/L 4.0 4.2 4.0   CHLORIDE mmol/L 102 96* 99   CO2 mmol/L 33.3* 33.1* 36.6*   BUN mg/dL 19 22 21   CREATININE mg/dL 0.50* 0.60 0.74   CALCIUM mg/dL 8.5* 8.4* 8.9   BILIRUBIN mg/dL  --  <0.2 <0.2   ALK PHOS U/L  --  86 93   ALT (SGPT) U/L  --  19 21   AST (SGOT) U/L  --  16 19   GLUCOSE mg/dL 120* 174* 155*     Results from last 7 days   Lab Units 05/09/23  0646 05/08/23  0528 05/07/23  1011 05/06/23  0618 05/05/23  0629 05/05/23  0100   WBC 10*3/mm3  --   --   --  9.33 8.80 8.54   HEMOGLOBIN g/dL 7.5* 7.1* 7.1* 7.2* 7.3* 7.7*   HEMATOCRIT % 28.0* 26.7* 26.7* 27.2* 27.8* 28.5*   PLATELETS 10*3/mm3  --   --   --  252 245 272         Results from last 7 days   Lab Units 05/05/23  0308 05/05/23  0100   HSTROP T ng/L 17* 20*                           Pertinent Radiology Results:    Imaging Results (All)     Procedure Component Value Units Date/Time    XR Chest 1 View [[id number]] Collected: 05/05/23 0755     Updated: 05/05/23 0758    Narrative:      PROCEDURE: XR CHEST 1 VW-     HISTORY: Chest Pain Triage Protocol, shortness of breath for a few days     COMPARISON: 05/01/2023.     FINDINGS: The heart is mildly enlarged, stable. Patient is rotated to  the right. There is mild interstitial disease bilaterally which is  stable from prior exam.. The lungs are clear. The mediastinum is  unremarkable. There is no pneumothorax.  There are no acute osseous  abnormalities. Apical lordotic positioning noted.         Impression:      Stable chest..           This report was signed and finalized on 5/5/2023 7:56 AM by Angeles Collins MD.          Echo:    Results for orders placed in visit on 11/02/22    Adult Transthoracic Echo Complete W/ Cont if Necessary Per Protocol    Interpretation Summary  1.  Normal left ventricular size and systolic function, LVEF 60-65%.  2.  Mild concentric LVH.  3.  Normal LV diastolic filling  pattern.  4.  Normal right ventricular size and systolic function.  5.  Mildly increased left atrial volume index.  6.  No significant valvular abnormalities.    Condition on Discharge:      Stable.    Code status during the hospital stay:    Code Status and Medical Interventions:   Ordered at: 05/05/23 0525     Code Status (Patient has no pulse and is not breathing):    CPR (Attempt to Resuscitate)     Medical Interventions (Patient has pulse or is breathing):    Full Support     Discharge Disposition:    Rehab Facility or Unit (DC - External)    Discharge Medications:       Discharge Medications      New Medications      Instructions Start Date   ferrous sulfate 324 (65 Fe) MG tablet delayed-release EC tablet   324 mg, Oral, Daily With Breakfast   Start Date: May 12, 2023     guaiFENesin-dextromethorphan 100-10 MG/5ML syrup  Commonly known as: ROBITUSSIN DM   10 mL, Oral, Every 6 Hours PRN      predniSONE 20 MG tablet  Commonly known as: DELTASONE   Take 2 tablets by mouth Daily for 1 dose, then 1 tablet daily for 3 days, then 1/2 tablet daily for 3 days      sodium chloride 0.65 % nasal spray   2 sprays, Nasal, As Needed         Changes to Medications      Instructions Start Date   DULoxetine 60 MG capsule  Commonly known as: CYMBALTA  What changed: when to take this   60 mg, Oral, 2 Times Daily      oxyCODONE-acetaminophen 7.5-325 MG per tablet  Commonly known as: PERCOCET  What changed:   · when to take this  · reasons to take this   1 tablet, Oral, Every 8 Hours PRN         Continue These Medications      Instructions Start Date   albuterol sulfate  (90 Base) MCG/ACT inhaler  Commonly known as: PROVENTIL HFA;VENTOLIN HFA;PROAIR HFA   INHALE 2 PUFFS BY MOUTH EVERY FOUR HOURS AS NEEDED FOR WHEEZING      apixaban 5 MG tablet tablet  Commonly known as: Eliquis   5 mg, Oral, Every 12 Hours      atorvastatin 20 MG tablet  Commonly known as: LIPITOR   TAKE 1 TABLET BY MOUTH DAILY      benzonatate 100 MG  capsule  Commonly known as: Tessalon Perles   100 mg, Oral, 3 Times Daily PRN      Breztri Aerosphere 160-9-4.8 MCG/ACT aerosol inhaler  Generic drug: Budeson-Glycopyrrol-Formoterol   2 puffs, Inhalation, 2 Times Daily, Rinse mouth out after use      cetirizine 10 MG tablet  Commonly known as: zyrTEC   10 mg, Oral, Daily      clonazePAM 1 MG tablet  Commonly known as: KlonoPIN   1 mg, Oral, Daily PRN      clotrimazole-betamethasone 1-0.05 % cream  Commonly known as: Lotrisone   Topical, 2 Times Daily      dilTIAZem  MG 24 hr capsule  Commonly known as: CARDIZEM CD   240 mg, Oral, Daily      fluticasone 50 MCG/ACT nasal spray  Commonly known as: FLONASE   2 sprays, Nasal, Daily      furosemide 40 MG tablet  Commonly known as: LASIX   40 mg, Oral, Daily      ipratropium-albuterol 0.5-2.5 mg/3 ml nebulizer  Commonly known as: DUO-NEB   USE 3 mL VIA NEBULIZER EVERY 4 HOURS AS NEEDED FOR WHEEZING      methocarbamol 500 MG tablet  Commonly known as: Robaxin   1 qid po prn      O2  Commonly known as: OXYGEN   2 L/min, Inhalation, As Needed      ondansetron 4 MG tablet  Commonly known as: ZOFRAN   4 mg, Oral, Every 8 Hours PRN      ondansetron ODT 4 MG disintegrating tablet  Commonly known as: ZOFRAN-ODT   4 mg, Translingual, Every 8 Hours PRN      pantoprazole 40 MG EC tablet  Commonly known as: PROTONIX   40 mg, Oral, Daily      sertraline 100 MG tablet  Commonly known as: ZOLOFT   TAKE 1 & 1/2 TABLET BY MOUTH DAILY      traZODone 100 MG tablet  Commonly known as: DESYREL   1 qhs po prn      vitamin D3 125 MCG (5000 UT) capsule capsule   5,000 Units, Oral, Daily         Stop These Medications    fluconazole 150 MG tablet  Commonly known as: DIFLUCAN        ASK your doctor about these medications      Instructions Start Date   potassium chloride 10 MEQ CR tablet  Ask about: Should I take this medication?   20 mEq, Oral, Daily           Discharge Diet:   Cardiac    Activity at Discharge:   As tolerated    Follow-up  Appointments:    Additional Instructions for the Follow-ups that You Need to Schedule     Ambulatory Referral to Case Management Disease Education   As directed      Which Programs?: Disease Education            Contact information for follow-up providers     Paul Quinteros MD Follow up in 3 day(s).    Specialty: Internal Medicine  Why: going to rehab for now  Contact information:  [address]  [address]  [facility] [address]  [phone]             Doris Juárez APRN Follow up on 5/19/2023.    Specialties: Pulmonary Disease, Nurse Practitioner  Why: @10:`15am  Contact information:  [address]  [facility] [address]  [phone]             [facility] CASE MANAGEMENT [place] .    Specialty: Population Health                 Contact information for after-discharge care     Destination     [facility] .    Service: Skilled Nursing  Contact information:  [address]  [facility] [address] [phone]                 Durable Medical Equipment     [facility] .    Service: Durable Medical Equipment  Contact information:  [address] [place] [address]  [facility] [address]  [phone]           [facility] .    Service: Durable Medical Equipment  Contact information:  [phone]                           Future Appointments   Date Time Provider Department Center   5/19/2023 10:15 AM Doris Juárez APRN [facility]   5/30/2023  3:30 PM Britt Farrar APRN [facility]   7/17/2023  2:00 PM Pedrito Avilez APRN [facility]  [facility]   8/14/2023  4:00 PM Taiwo Curry MD [facility] CD [facility]   10/20/2023  3:00 PM  4 - CHAIR 1 BH [facility] OP INF [facility]     Test Results Pending at Discharge:           Tom Keyes MD  05/11/23  12:51 EDT    Time: I spent 27 minutes on this discharge activity which included: face-to-face encounter with the patient, reviewing the data in the system, coordination  of the care with the nursing staff as well as consultants, documentation, and entering orders.     Dictated utilizing Dragon dictation.

## 2023-05-12 DIAGNOSIS — F41.9 ANXIETY: ICD-10-CM

## 2023-05-12 RX ORDER — CLONAZEPAM 1 MG/1
1 TABLET ORAL 2 TIMES DAILY PRN
Qty: 60 TABLET | Refills: 3 | Status: SHIPPED | OUTPATIENT
Start: 2023-05-12

## 2023-05-12 NOTE — TELEPHONE ENCOUNTER
(NEW SCRIPT)    IRENE REQUESTING MED REFILL FOR CLONAZEPAM 1 MG.    DIRECTIONS: CLONAZEPAM 1 MG 1 TAB PO BID PRN.

## 2023-05-15 NOTE — TELEPHONE ENCOUNTER
Per chart review Patient was discharged from [facility] to [place] for short term rehab on 05/11/23.

## 2023-05-16 ENCOUNTER — TELEPHONE (OUTPATIENT)
Dept: CASE MANAGEMENT | Facility: OTHER | Age: 78
End: 2023-05-16
Payer: MEDICARE

## 2023-05-16 NOTE — TELEPHONE ENCOUNTER
[facility] CM outreach with Patient. Left a voice mail requesting a return call to Angus Rowe Mgr at [phone].

## 2023-05-18 ENCOUNTER — TELEPHONE (OUTPATIENT)
Dept: INTERNAL MEDICINE | Facility: CLINIC | Age: 78
End: 2023-05-18
Payer: MEDICARE

## 2023-05-18 NOTE — TELEPHONE ENCOUNTER
Incoming Refill Request      Medication requested (name and dose): LASIX  PREDNISONE  SYMBICORT INHALER    Pharmacy where request should be sent: [facility]    Additional details provided by patient: PTS DAUGHTER WANTS TO KNOW IF LASIX AND MEDROL PACK COULD BE SENT IN. PT HAD A WEEK LONG STAY IN HOSPITAL AND IS STARTING TO SWELL.     Best call back number: [phone]    Does the patient have less than a 3 day supply:  [x] Yes  [] No    Rafa Smith Rep  05/18/23, 11:32 EDT

## 2023-05-22 ENCOUNTER — APPOINTMENT (OUTPATIENT)
Dept: CT IMAGING | Facility: HOSPITAL | Age: 78
DRG: 189 | End: 2023-05-22
Payer: MEDICARE

## 2023-05-22 ENCOUNTER — HOSPITAL ENCOUNTER (INPATIENT)
Facility: HOSPITAL | Age: 78
LOS: 3 days | Discharge: HOME | DRG: 189 | End: 2023-05-25
Attending: EMERGENCY MEDICINE | Admitting: INTERNAL MEDICINE
Payer: MEDICARE

## 2023-05-22 ENCOUNTER — APPOINTMENT (OUTPATIENT)
Dept: GENERAL RADIOLOGY | Facility: HOSPITAL | Age: 78
DRG: 189 | End: 2023-05-22
Payer: MEDICARE

## 2023-05-22 DIAGNOSIS — J43.9 PULMONARY EMPHYSEMA, UNSPECIFIED EMPHYSEMA TYPE: ICD-10-CM
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
## 2023-05-22 DIAGNOSIS — J44.1 COPD EXACERBATION: ICD-10-CM

## 2023-05-22 DIAGNOSIS — J18.9 HAP (HOSPITAL-ACQUIRED PNEUMONIA): ICD-10-CM

## 2023-05-22 DIAGNOSIS — A41.9 SEPSIS, DUE TO UNSPECIFIED ORGANISM, UNSPECIFIED WHETHER ACUTE ORGAN DYSFUNCTION PRESENT: ICD-10-CM

## 2023-05-22 DIAGNOSIS — J96.21 ACUTE ON CHRONIC RESPIRATORY FAILURE WITH HYPOXIA: ICD-10-CM

## 2023-05-22 DIAGNOSIS — E87.29 RESPIRATORY ACIDOSIS: ICD-10-CM

## 2023-05-22 DIAGNOSIS — J96.01 ACUTE RESPIRATORY FAILURE WITH HYPOXIA AND HYPERCAPNIA: Primary | ICD-10-CM

## 2023-05-22 DIAGNOSIS — J96.02 ACUTE RESPIRATORY FAILURE WITH HYPOXIA AND HYPERCAPNIA: Primary | ICD-10-CM

## 2023-05-22 LAB
A-A DO2: ABNORMAL
ALBUMIN SERPL-MCNC: 3.8 G/DL (ref 3.5–5.2)
ALBUMIN/GLOB SERPL: 1.5 G/DL
ALP SERPL-CCNC: 70 U/L (ref 39–117)
ALT SERPL W P-5'-P-CCNC: 30 U/L (ref 1–33)
ANION GAP SERPL CALCULATED.3IONS-SCNC: 7 MMOL/L (ref 5–15)
ANISOCYTOSIS BLD QL: NORMAL
ARTERIAL PATENCY WRIST A: POSITIVE
AST SERPL-CCNC: 26 U/L (ref 1–32)
ATMOSPHERIC PRESS: 735 MMHG
BASE EXCESS BLDA CALC-SCNC: 10.6 MMOL/L (ref 0–2)
BASOPHILS # BLD AUTO: 0.04 10*3/MM3 (ref 0–0.2)
BASOPHILS NFR BLD AUTO: 0.3 % (ref 0–1.5)
BDY SITE: ABNORMAL
BILIRUB SERPL-MCNC: 0.2 MG/DL (ref 0–1.2)
BUN SERPL-MCNC: 24 MG/DL (ref 8–23)
BUN/CREAT SERPL: 37.5 (ref 7–25)
CALCIUM SPEC-SCNC: 8.6 MG/DL (ref 8.6–10.5)
CHLORIDE SERPL-SCNC: 95 MMOL/L (ref 98–107)
CO2 SERPL-SCNC: 35 MMOL/L (ref 22–29)
COHGB MFR BLD: 2.3 % (ref 0–2)
CREAT SERPL-MCNC: 0.64 MG/DL (ref 0.57–1)
DEPRECATED RDW RBC AUTO: 66 FL (ref 37–54)
EGFRCR SERPLBLD CKD-EPI 2021: 91.7 ML/MIN/1.73
EOSINOPHIL # BLD AUTO: 0.01 10*3/MM3 (ref 0–0.4)
EOSINOPHIL NFR BLD AUTO: 0.1 % (ref 0.3–6.2)
ERYTHROCYTE [DISTWIDTH] IN BLOOD BY AUTOMATED COUNT: 21.9 % (ref 12.3–15.4)
GAS FLOW AIRWAY: 6 LPM
GLOBULIN UR ELPH-MCNC: 2.5 GM/DL
GLUCOSE SERPL-MCNC: 135 MG/DL (ref 65–99)
HCO3 BLDA-SCNC: 38.1 MMOL/L (ref 22–28)
HCT VFR BLD AUTO: 26.4 % (ref 34–46.6)
HCT VFR BLD CALC: 22.3 %
HGB BLD-MCNC: 7 G/DL (ref 12–15.9)
HOLD SPECIMEN: NORMAL
HOLD SPECIMEN: NORMAL
HYPOCHROMIA BLD QL: NORMAL
IMM GRANULOCYTES # BLD AUTO: 0.06 10*3/MM3 (ref 0–0.05)
IMM GRANULOCYTES NFR BLD AUTO: 0.5 % (ref 0–0.5)
LYMPHOCYTES # BLD AUTO: 0.71 10*3/MM3 (ref 0.7–3.1)
LYMPHOCYTES NFR BLD AUTO: 5.9 % (ref 19.6–45.3)
Lab: ABNORMAL
Lab: ABNORMAL
MCH RBC QN AUTO: 22.1 PG (ref 26.6–33)
MCHC RBC AUTO-ENTMCNC: 26.5 G/DL (ref 31.5–35.7)
MCV RBC AUTO: 83.3 FL (ref 79–97)
METHGB BLD QL: 1 % (ref 0–1.5)
MODALITY: ABNORMAL
MONOCYTES # BLD AUTO: 0.57 10*3/MM3 (ref 0.1–0.9)
MONOCYTES NFR BLD AUTO: 4.7 % (ref 5–12)
NEUTROPHILS NFR BLD AUTO: 10.68 10*3/MM3 (ref 1.7–7)
NEUTROPHILS NFR BLD AUTO: 88.5 % (ref 42.7–76)
NOTE: ABNORMAL
NOTIFIED BY: ABNORMAL
NOTIFIED WHO: ABNORMAL
NRBC BLD AUTO-RTO: 0 /100 WBC (ref 0–0.2)
NT-PROBNP SERPL-MCNC: 1664 PG/ML (ref 0–1800)
OXYHGB MFR BLDV: 90 % (ref 94–99)
PCO2 BLDA: 74.3 MM HG (ref 35–45)
PCO2 TEMP ADJ BLD: ABNORMAL MM[HG]
PH BLDA: 7.32 PH UNITS (ref 7.35–7.45)
PH, TEMP CORRECTED: ABNORMAL
PLATELET # BLD AUTO: 188 10*3/MM3 (ref 140–450)
PMV BLD AUTO: 9.7 FL (ref 6–12)
PO2 BLDA: 69.2 MM HG (ref 75–100)
PO2 TEMP ADJ BLD: ABNORMAL MM[HG]
POTASSIUM SERPL-SCNC: 4.5 MMOL/L (ref 3.5–5.2)
PROT SERPL-MCNC: 6.3 G/DL (ref 6–8.5)
RBC # BLD AUTO: 3.17 10*6/MM3 (ref 3.77–5.28)
SAO2 % BLDCOA: 93.1 % (ref 94–100)
SMALL PLATELETS BLD QL SMEAR: ADEQUATE
SODIUM SERPL-SCNC: 137 MMOL/L (ref 136–145)
TROPONIN T SERPL HS-MCNC: 20 NG/L
VENTILATOR MODE: ABNORMAL
WBC MORPH BLD: NORMAL
WBC NRBC COR # BLD: 12.07 10*3/MM3 (ref 3.4–10.8)
WHOLE BLOOD HOLD COAG: NORMAL
WHOLE BLOOD HOLD SPECIMEN: NORMAL

## 2023-05-22 PROCEDURE — 83880 ASSAY OF NATRIURETIC PEPTIDE: CPT

## 2023-05-22 PROCEDURE — 71275 CT ANGIOGRAPHY CHEST: CPT

## 2023-05-22 PROCEDURE — 82375 ASSAY CARBOXYHB QUANT: CPT

## 2023-05-22 PROCEDURE — 80053 COMPREHEN METABOLIC PANEL: CPT

## 2023-05-22 PROCEDURE — 99285 EMERGENCY DEPT VISIT HI MDM: CPT

## 2023-05-22 PROCEDURE — 25510000001 IOPAMIDOL 61 % SOLUTION: Performed by: EMERGENCY MEDICINE

## 2023-05-22 PROCEDURE — 83050 HGB METHEMOGLOBIN QUAN: CPT

## 2023-05-22 PROCEDURE — 71045 X-RAY EXAM CHEST 1 VIEW: CPT

## 2023-05-22 PROCEDURE — 85007 BL SMEAR W/DIFF WBC COUNT: CPT

## 2023-05-22 PROCEDURE — 25010000002 LORAZEPAM PER 2 MG: Performed by: EMERGENCY MEDICINE

## 2023-05-22 PROCEDURE — 87040 BLOOD CULTURE FOR BACTERIA: CPT | Performed by: PHYSICIAN ASSISTANT

## 2023-05-22 PROCEDURE — 85025 COMPLETE CBC W/AUTO DIFF WBC: CPT

## 2023-05-22 PROCEDURE — 25010000002 PIPERACILLIN SOD-TAZOBACTAM PER 1 G: Performed by: PHYSICIAN ASSISTANT

## 2023-05-22 PROCEDURE — 36600 WITHDRAWAL OF ARTERIAL BLOOD: CPT

## 2023-05-22 PROCEDURE — 82805 BLOOD GASES W/O2 SATURATION: CPT

## 2023-05-22 PROCEDURE — 25010000002 ONDANSETRON PER 1 MG: Performed by: EMERGENCY MEDICINE

## 2023-05-22 PROCEDURE — 36415 COLL VENOUS BLD VENIPUNCTURE: CPT

## 2023-05-22 PROCEDURE — 94799 UNLISTED PULMONARY SVC/PX: CPT

## 2023-05-22 PROCEDURE — 94660 CPAP INITIATION&MGMT: CPT

## 2023-05-22 PROCEDURE — 84484 ASSAY OF TROPONIN QUANT: CPT

## 2023-05-22 PROCEDURE — 93005 ELECTROCARDIOGRAM TRACING: CPT

## 2023-05-22 PROCEDURE — 99223 1ST HOSP IP/OBS HIGH 75: CPT | Performed by: INTERNAL MEDICINE

## 2023-05-22 PROCEDURE — 25010000002 MORPHINE PER 10 MG: Performed by: EMERGENCY MEDICINE

## 2023-05-22 RX ORDER — SODIUM CHLORIDE 0.9 % (FLUSH) 0.9 %
10 SYRINGE (ML) INJECTION EVERY 12 HOURS SCHEDULED
Status: DISCONTINUED | OUTPATIENT
Start: 2023-05-23 | End: 2023-05-25 | Stop reason: HOSPADM

## 2023-05-22 RX ORDER — ONDANSETRON 2 MG/ML
4 INJECTION INTRAMUSCULAR; INTRAVENOUS EVERY 6 HOURS PRN
Status: DISCONTINUED | OUTPATIENT
Start: 2023-05-22 | End: 2023-05-25 | Stop reason: HOSPADM

## 2023-05-22 RX ORDER — LORAZEPAM 2 MG/ML
0.5 INJECTION INTRAMUSCULAR ONCE
Status: COMPLETED | OUTPATIENT
Start: 2023-05-22 | End: 2023-05-22

## 2023-05-22 RX ORDER — ACETAMINOPHEN 650 MG/1
650 SUPPOSITORY RECTAL EVERY 4 HOURS PRN
Status: DISCONTINUED | OUTPATIENT
Start: 2023-05-22 | End: 2023-05-25 | Stop reason: HOSPADM

## 2023-05-22 RX ORDER — DULOXETIN HYDROCHLORIDE 30 MG/1
60 CAPSULE, DELAYED RELEASE ORAL DAILY
Status: DISCONTINUED | OUTPATIENT
Start: 2023-05-23 | End: 2023-05-25 | Stop reason: HOSPADM

## 2023-05-22 RX ORDER — IPRATROPIUM BROMIDE AND ALBUTEROL SULFATE 2.5; .5 MG/3ML; MG/3ML
3 SOLUTION RESPIRATORY (INHALATION) EVERY 4 HOURS PRN
Status: DISCONTINUED | OUTPATIENT
Start: 2023-05-22 | End: 2023-05-25 | Stop reason: HOSPADM

## 2023-05-22 RX ORDER — POLYETHYLENE GLYCOL 3350 17 G/17G
17 POWDER, FOR SOLUTION ORAL DAILY PRN
Status: DISCONTINUED | OUTPATIENT
Start: 2023-05-22 | End: 2023-05-25 | Stop reason: HOSPADM

## 2023-05-22 RX ORDER — OXYCODONE AND ACETAMINOPHEN 7.5; 325 MG/1; MG/1
1 TABLET ORAL EVERY 4 HOURS PRN
Status: DISCONTINUED | OUTPATIENT
Start: 2023-05-22 | End: 2023-05-25 | Stop reason: HOSPADM

## 2023-05-22 RX ORDER — L.ACID,PARA/B.BIFIDUM/S.THERM 8B CELL
1 CAPSULE ORAL 2 TIMES DAILY
Status: DISCONTINUED | OUTPATIENT
Start: 2023-05-23 | End: 2023-05-25 | Stop reason: HOSPADM

## 2023-05-22 RX ORDER — BENZONATATE 100 MG/1
100 CAPSULE ORAL 3 TIMES DAILY PRN
Status: DISCONTINUED | OUTPATIENT
Start: 2023-05-22 | End: 2023-05-25 | Stop reason: HOSPADM

## 2023-05-22 RX ORDER — BISACODYL 5 MG/1
5 TABLET, DELAYED RELEASE ORAL DAILY PRN
Status: DISCONTINUED | OUTPATIENT
Start: 2023-05-22 | End: 2023-05-25 | Stop reason: HOSPADM

## 2023-05-22 RX ORDER — MORPHINE SULFATE 2 MG/ML
2 INJECTION, SOLUTION INTRAMUSCULAR; INTRAVENOUS ONCE
Status: COMPLETED | OUTPATIENT
Start: 2023-05-22 | End: 2023-05-22

## 2023-05-22 RX ORDER — PANTOPRAZOLE SODIUM 40 MG/1
40 TABLET, DELAYED RELEASE ORAL DAILY
Status: DISCONTINUED | OUTPATIENT
Start: 2023-05-23 | End: 2023-05-25 | Stop reason: HOSPADM

## 2023-05-22 RX ORDER — SODIUM CHLORIDE 0.9 % (FLUSH) 0.9 %
10 SYRINGE (ML) INJECTION AS NEEDED
Status: DISCONTINUED | OUTPATIENT
Start: 2023-05-22 | End: 2023-05-25 | Stop reason: HOSPADM

## 2023-05-22 RX ORDER — CETIRIZINE HYDROCHLORIDE 10 MG/1
10 TABLET ORAL DAILY
Status: DISCONTINUED | OUTPATIENT
Start: 2023-05-23 | End: 2023-05-25 | Stop reason: HOSPADM

## 2023-05-22 RX ORDER — ONDANSETRON 2 MG/ML
4 INJECTION INTRAMUSCULAR; INTRAVENOUS ONCE
Status: COMPLETED | OUTPATIENT
Start: 2023-05-22 | End: 2023-05-22

## 2023-05-22 RX ORDER — CEFTRIAXONE 1 G/50ML
1 INJECTION, SOLUTION INTRAVENOUS ONCE
Status: DISCONTINUED | OUTPATIENT
Start: 2023-05-22 | End: 2023-05-22

## 2023-05-22 RX ORDER — BUDESONIDE 0.5 MG/2ML
0.5 INHALANT ORAL
Status: DISCONTINUED | OUTPATIENT
Start: 2023-05-23 | End: 2023-05-25 | Stop reason: HOSPADM

## 2023-05-22 RX ORDER — GUAIFENESIN/DEXTROMETHORPHAN 100-10MG/5
10 SYRUP ORAL EVERY 6 HOURS PRN
Status: DISCONTINUED | OUTPATIENT
Start: 2023-05-22 | End: 2023-05-25 | Stop reason: HOSPADM

## 2023-05-22 RX ORDER — AMOXICILLIN 250 MG
2 CAPSULE ORAL 2 TIMES DAILY
Status: DISCONTINUED | OUTPATIENT
Start: 2023-05-23 | End: 2023-05-25 | Stop reason: HOSPADM

## 2023-05-22 RX ORDER — METHYLPREDNISOLONE SODIUM SUCCINATE 40 MG/ML
40 INJECTION, POWDER, LYOPHILIZED, FOR SOLUTION INTRAMUSCULAR; INTRAVENOUS EVERY 12 HOURS
Status: DISCONTINUED | OUTPATIENT
Start: 2023-05-23 | End: 2023-05-23 | Stop reason: RX

## 2023-05-22 RX ORDER — IPRATROPIUM BROMIDE AND ALBUTEROL SULFATE 2.5; .5 MG/3ML; MG/3ML
3 SOLUTION RESPIRATORY (INHALATION)
Status: DISCONTINUED | OUTPATIENT
Start: 2023-05-23 | End: 2023-05-25 | Stop reason: HOSPADM

## 2023-05-22 RX ORDER — TRAZODONE HYDROCHLORIDE 50 MG/1
100 TABLET ORAL NIGHTLY PRN
Status: DISCONTINUED | OUTPATIENT
Start: 2023-05-22 | End: 2023-05-25 | Stop reason: HOSPADM

## 2023-05-22 RX ORDER — FLUTICASONE PROPIONATE 50 MCG
2 SPRAY, SUSPENSION (ML) NASAL DAILY
Status: DISCONTINUED | OUTPATIENT
Start: 2023-05-23 | End: 2023-05-25 | Stop reason: HOSPADM

## 2023-05-22 RX ORDER — CLONAZEPAM 0.5 MG/1
1 TABLET ORAL 2 TIMES DAILY PRN
Status: DISCONTINUED | OUTPATIENT
Start: 2023-05-22 | End: 2023-05-25 | Stop reason: HOSPADM

## 2023-05-22 RX ORDER — BISACODYL 10 MG
10 SUPPOSITORY, RECTAL RECTAL DAILY PRN
Status: DISCONTINUED | OUTPATIENT
Start: 2023-05-22 | End: 2023-05-25 | Stop reason: HOSPADM

## 2023-05-22 RX ORDER — ACETAMINOPHEN 160 MG/5ML
650 SOLUTION ORAL EVERY 4 HOURS PRN
Status: DISCONTINUED | OUTPATIENT
Start: 2023-05-22 | End: 2023-05-25 | Stop reason: HOSPADM

## 2023-05-22 RX ORDER — SODIUM CHLORIDE 9 MG/ML
40 INJECTION, SOLUTION INTRAVENOUS AS NEEDED
Status: DISCONTINUED | OUTPATIENT
Start: 2023-05-22 | End: 2023-05-25 | Stop reason: HOSPADM

## 2023-05-22 RX ORDER — ACETAMINOPHEN 325 MG/1
650 TABLET ORAL EVERY 4 HOURS PRN
Status: DISCONTINUED | OUTPATIENT
Start: 2023-05-22 | End: 2023-05-25 | Stop reason: HOSPADM

## 2023-05-22 RX ORDER — ATORVASTATIN CALCIUM 20 MG/1
20 TABLET, FILM COATED ORAL DAILY
Status: DISCONTINUED | OUTPATIENT
Start: 2023-05-23 | End: 2023-05-25 | Stop reason: HOSPADM

## 2023-05-22 RX ADMIN — ONDANSETRON 4 MG: 2 INJECTION INTRAMUSCULAR; INTRAVENOUS at 22:21

## 2023-05-22 RX ADMIN — LORAZEPAM 0.5 MG: 2 INJECTION INTRAMUSCULAR; INTRAVENOUS at 19:02

## 2023-05-22 RX ADMIN — MORPHINE SULFATE 2 MG: 2 INJECTION, SOLUTION INTRAMUSCULAR; INTRAVENOUS at 22:21

## 2023-05-22 RX ADMIN — TAZOBACTAM SODIUM AND PIPERACILLIN SODIUM 3.38 G: 375; 3 INJECTION, SOLUTION INTRAVENOUS at 22:23

## 2023-05-22 RX ADMIN — IOPAMIDOL 100 ML: 612 INJECTION, SOLUTION INTRAVENOUS at 20:29

## 2023-05-22 NOTE — ED PROVIDER NOTES
Subjective  History of Present Illness:    Chief Complaint: Shortness of breath.  History of Present Illness: 78-year-old female with history of COPD, hypertension, hyperlipidemia, A-fib, COPD on oxygen presents with shortness of breath for 1 to 2 days despite using oxygen and nebs at home.  Onset: Gradual onset  Duration: Worse over the last several days  Exacerbating / Alleviating factors: Has been using oxygen and taking a nap, but no improvement, also has a cough and congestion  Associated symptoms: Weakness      Nurses Notes reviewed and agree, including vitals, allergies, social history and prior medical history.     REVIEW OF SYSTEMS: All systems reviewed and not pertinent unless noted.    Review of Systems   Constitutional: Positive for chills.   Respiratory: Positive for cough, shortness of breath and wheezing.    Neurological: Positive for weakness.   All other systems reviewed and are negative.      Past Medical History:   Diagnosis Date   • Adrenal adenoma    • Anemia    • Arrhythmia    • Asthma    • Atrial fibrillation    • Back pain    • Benign colonic polyp    • Benign tumor of adrenal gland    • Cataract     bilateral   • Cholelithiasis    • Chronic bronchitis    • Chronic bronchitis with COPD (chronic obstructive pulmonary disease)    • COPD (chronic obstructive pulmonary disease) 2005   • Coronary artery disease    • Depression    • Elevated cholesterol    • Environmental and seasonal allergies    • Fibromyalgia    • Gastritis    • Generalized anxiety disorder    • GERD (gastroesophageal reflux disease)    • H/O mammogram    • Hemorrhoids    • History of blood transfusion 1985   • History of blood transfusion 1985   • History of echocardiogram    • History of endometriosis    • History of nuclear stress test    • Hypertension    • IBS (irritable bowel syndrome)    • Impaired functional mobility, balance, gait, and endurance    • Impaired mobility    • Inverted nipple    • Kidney disease    • Kidney  stone    • Liver cyst    • Nodular radiologic density    • Nodule of left lung    • On home oxygen therapy     2 liters NC QHS   • Osteoarthritis    • Osteoporosis    • PONV (postoperative nausea and vomiting)    • Renal cyst    • Sinus problem        • Sinusitis    • Skin cancer     basal cell carcinoma   • SOB (shortness of breath)    • Tobacco use    • Urinary frequency    • Vitamin D deficiency    • Wears glasses    • Wears partial dentures     upper plate       Allergies:    Doxycycline      Past Surgical History:   Procedure Laterality Date   • APPENDECTOMY     • CARDIAC CATHETERIZATION     • CATARACT EXTRACTION      both eyes   • CHOLECYSTECTOMY WITH INTRAOPERATIVE CHOLANGIOGRAM N/A 10/30/2020    Procedure: CHOLECYSTECTOMY LAPAROSCOPIC INTRAOPERATIVE CHOLANGIOGRAPHY;  Surgeon: Sima Moses MD;  Location: [facility] OR;  Service: General;  Laterality: N/A;   • COLONOSCOPY  2013   • COLONOSCOPY  2016   • COLONOSCOPY N/A 2019    Procedure: COLONOSCOPY W/ COLD FORCEP POLYPECTOMIES; HOT SNARE POLYPECTOMIES; COLD SNARE POLYPECTOMY;  Surgeon: Goyo Nunez MD;  Location: [facility] ENDOSCOPY;  Service: Gastroenterology   • COLONOSCOPY W/ BIOPSIES AND POLYPECTOMY     • EXPLORATORY LAPAROTOMY N/A 2020    Procedure: colectomy, right, closure of enterotomy x 2, reduction of internal volvulus;  Surgeon: Sima Moses MD;  Location: [facility] OR;  Service: General;  Laterality: N/A;   • HYSTERECTOMY      partial   • LYSIS OF ABDOMINAL ADHESIONS     • TONSILLECTOMY     • UPPER GASTROINTESTINAL ENDOSCOPY  2015   • VAGINAL DELIVERY      x2         Social History     Socioeconomic History   • Marital status:    Tobacco Use   • Smoking status: Former     Packs/day: 0.25     Years: 30.00     Pack years: 7.50     Types: Cigarettes     Quit date: 2020     Years since quittin.5     Passive exposure: Past   • Smokeless tobacco: Never   Vaping Use   • Vaping Use:  "Never used   Substance and Sexual Activity   • Alcohol use: No   • Drug use: No   • Sexual activity: Defer         Family History   Problem Relation Age of Onset   • Stomach cancer Brother    • Colon cancer Maternal Aunt    • Brain cancer Father    • Arthritis Father    • Hypertension Father    • Lung cancer Paternal Grandfather    • Breast cancer Neg Hx    • Ovarian cancer Neg Hx        Objective  Physical Exam:  /70 (BP Location: Left arm, Patient Position: Lying)   Pulse 80   Temp 98.5 °F (36.9 °C) (Oral)   Resp 19   Ht 165.1 cm (65\")   Wt 75.6 kg (166 lb 10.7 oz)   SpO2 98%   BMI 27.73 kg/m²      Physical Exam  Vitals and nursing note reviewed.   Constitutional:       Appearance: She is well-developed. She is ill-appearing.   Cardiovascular:      Rate and Rhythm: Normal rate and regular rhythm.   Pulmonary:      Effort: Pulmonary effort is normal.      Breath sounds: Decreased breath sounds and wheezing present.   Abdominal:      General: Bowel sounds are normal.      Palpations: Abdomen is soft.   Musculoskeletal:         General: Normal range of motion.      Cervical back: Normal range of motion and neck supple.   Skin:     General: Skin is warm and dry.   Neurological:      Mental Status: She is alert and oriented to person, place, and time.      Deep Tendon Reflexes: Reflexes are normal and symmetric.           Procedures    ED Course:    ED Course as of 05/22/23 2234   Mon May 22, 2023   1811 EKG interpreted by me.  Sinus rhythm.  Rate of 87.  No ST segment or T wave changes.  Normal EKG. [CG]   1845 Respiratory failure, hypoxia with hypercapnia, patient will be placed on BiPAP [CS]      ED Course User Index  [CG] Jd Lucas,   [CS] Maximo Narayanan Jr., PASharifC       Lab Results (last 24 hours)     Procedure Component Value Units Date/Time    CBC & Differential [[id number]]  (Abnormal) Collected: 05/22/23 1811    Specimen: Blood Updated: 05/22/23 1902    Narrative:      The following " orders were created for panel order CBC & Differential.  Procedure                               Abnormality         Status                     ---------                               -----------         ------                     CBC Auto Differential[[id number]]        Abnormal            Final result               Scan Slide[[id number]]                                       Final result                 Please view results for these tests on the individual orders.    Comprehensive Metabolic Panel [[id number]]  (Abnormal) Collected: 05/22/23 1811    Specimen: Blood Updated: 05/22/23 1834     Glucose 135 mg/dL      BUN 24 mg/dL      Creatinine 0.64 mg/dL      Sodium 137 mmol/L      Potassium 4.5 mmol/L      Chloride 95 mmol/L      CO2 35.0 mmol/L      Calcium 8.6 mg/dL      Total Protein 6.3 g/dL      Albumin 3.8 g/dL      ALT (SGPT) 30 U/L      AST (SGOT) 26 U/L      Alkaline Phosphatase 70 U/L      Total Bilirubin 0.2 mg/dL      Globulin 2.5 gm/dL      A/G Ratio 1.5 g/dL      BUN/Creatinine Ratio 37.5     Anion Gap 7.0 mmol/L      eGFR 91.7 mL/min/1.73     Narrative:      GFR Normal >60  Chronic Kidney Disease <60  Kidney Failure <15    The GFR formula is only valid for adults with stable renal function between ages 18 and 70.    BNP [[id number]]  (Normal) Collected: 05/22/23 1811    Specimen: Blood Updated: 05/22/23 1837     proBNP 1,664.0 pg/mL     Narrative:      Among patients with dyspnea, NT-proBNP is highly sensitive for the detection of acute congestive heart failure. In addition NT-proBNP of <300 pg/ml effectively rules out acute congestive heart failure with 99% negative predictive value.    Results may be falsely decreased if patient taking Biotin.      Single High Sensitivity Troponin T [[id number]]  (Abnormal) Collected: 05/22/23 1811    Specimen: Blood Updated: 05/22/23 1837     HS Troponin T 20 ng/L     Narrative:      High Sensitive Troponin T Reference Range:  <10.0 ng/L- Negative Female for AMI  <15.0  ng/L- Negative Male for AMI  >=10 - Abnormal Female indicating possible myocardial injury.  >=15 - Abnormal Male indicating possible myocardial injury.   Clinicians would have to utilize clinical acumen, EKG, Troponin, and serial changes to determine if it is an Acute Myocardial Infarction or myocardial injury due to an underlying chronic condition.         CBC Auto Differential [[id number]]  (Abnormal) Collected: 05/22/23 1811    Specimen: Blood Updated: 05/22/23 1902     WBC 12.07 10*3/mm3      RBC 3.17 10*6/mm3      Hemoglobin 7.0 g/dL      Hematocrit 26.4 %      MCV 83.3 fL      MCH 22.1 pg      MCHC 26.5 g/dL      RDW 21.9 %      RDW-SD 66.0 fl      MPV 9.7 fL      Platelets 188 10*3/mm3      Neutrophil % 88.5 %      Lymphocyte % 5.9 %      Monocyte % 4.7 %      Eosinophil % 0.1 %      Basophil % 0.3 %      Immature Grans % 0.5 %      Neutrophils, Absolute 10.68 10*3/mm3      Lymphocytes, Absolute 0.71 10*3/mm3      Monocytes, Absolute 0.57 10*3/mm3      Eosinophils, Absolute 0.01 10*3/mm3      Basophils, Absolute 0.04 10*3/mm3      Immature Grans, Absolute 0.06 10*3/mm3      nRBC 0.0 /100 WBC     Scan Slide [[id number]] Collected: 05/22/23 1811    Specimen: Blood Updated: 05/22/23 1902     Anisocytosis Mod/2+     Hypochromia Mod/2+     WBC Morphology Normal     Platelet Estimate Adequate    Blood Gas, Arterial With Co-Ox [[id number]]  (Abnormal) Collected: 05/22/23 1843    Specimen: Arterial Blood Updated: 05/22/23 1843     Site Right Radial     Glenn's Test Positive     pH, Arterial 7.318 pH units      Comment: 84 Value below reference range        pCO2, Arterial 74.3 mm Hg      Comment: 86 Value above critical limit        pO2, Arterial 69.2 mm Hg      Comment: 84 Value below reference range        HCO3, Arterial 38.1 mmol/L      Comment: 83 Value above reference range        Base Excess, Arterial 10.6 mmol/L      Comment: 83 Value above reference range        O2 Saturation, Arterial 93.1 %      Comment: 84  Value below reference range        Hematocrit, Blood Gas 22.3 %      Comment: 84 Value below reference range        Oxyhemoglobin 90.0 %      Comment: 84 Value below reference range        Methemoglobin 1.00 %      Carboxyhemoglobin 2.3 %      A-a DO2 --     Comment: UNABLE TO CALCULATE        Barometric Pressure for Blood Gas 735 mmHg      Modality Nasal Cannula     Flow Rate 6.0 lpm      Ventilator Mode NA     Note --     Notified Who XAVIER PARKS     Notified By [id number]     Notified Time 05/22/2023 18:44     Collected by KALEIGH     Comment: Meter: [id number]     :  [id number]        pH, Temp Corrected --     pCO2, Temperature Corrected --     pO2, Temperature Corrected --    Blood Culture - Blood, Arm, Left [[id number]] Collected: 05/22/23 2204    Specimen: Blood from Arm, Left Updated: 05/22/23 2222    Blood Culture - Blood, Arm, Left [[id number]] Collected: 05/22/23 2217    Specimen: Blood from Arm, Left Updated: 05/22/23 2223           No radiology results from the last 24 hrs       Medical Decision Making  78-year-old female presents with shortness of breath, hypoxia, cough, congestion, weakness and chills.  Just recently discharged from the hospital with respiratory failure, pneumonia, she does have a history of COPD oxygen dependent.  Differential would include hospital-acquired pneumonia, pneumonia, COVID, flu, respiratory failure, hypercapnia, sepsis.  Upon evaluation the patient did have wheezing and decreased breath sounds, she was hypoxic, she was requiring increased oxygen due to hypoxia, she had an IV established, she was put on cardiac monitoring, she had a neb treatment, and a CT scan of her chest.  CT scan showed pneumonia, her ABG showed hypercapnia, she was placed on BiPAP.  Her labs showed elevated white count, cultures were drawn, and she was put on Zosyn for hospital-acquired pneumonia.  I discussed the case with my supervising physician as well as the hospitalist, she will be  admitted to [facility].    30 minutes of critical care provided. This time excludes other billable procedures. Time does include preparation of documents, medical consultations, review of old records, and direct bedside care. Patient is at high risk for life-threatening deterioration due to sepsis, requiring broad-spectrum antibiotics, and multiple fluid boluses.  Respiratory failure hypoxia and hypercapnia, requiring BiPAP, and admission..     Acute respiratory failure with hypoxia and hypercapnia: acute illness or injury  HAP (hospital-acquired pneumonia): acute illness or injury  Respiratory acidosis: acute illness or injury  Sepsis, due to unspecified organism, unspecified whether acute organ dysfunction present: acute illness or injury  Amount and/or Complexity of Data Reviewed  Labs: ordered. Decision-making details documented in ED Course.  Radiology: ordered. Decision-making details documented in ED Course.  ECG/medicine tests: ordered. Decision-making details documented in ED Course.      Risk  Prescription drug management.  Decision regarding hospitalization.            Final diagnoses:   Acute respiratory failure with hypoxia and hypercapnia   HAP (hospital-acquired pneumonia)   Respiratory acidosis   Sepsis, due to unspecified organism, unspecified whether acute organ dysfunction present        Maximo Narayanan Jr., PA-C  05/22/23 [id number]

## 2023-05-23 PROBLEM — J69.0 ASPIRATION PNEUMONIA OF RIGHT LOWER LOBE: Status: ACTIVE | Noted: 2023-05-23

## 2023-05-23 LAB
ANION GAP SERPL CALCULATED.3IONS-SCNC: 6.7 MMOL/L (ref 5–15)
BUN SERPL-MCNC: 18 MG/DL (ref 8–23)
BUN/CREAT SERPL: 30.5 (ref 7–25)
CALCIUM SPEC-SCNC: 8.5 MG/DL (ref 8.6–10.5)
CHLORIDE SERPL-SCNC: 100 MMOL/L (ref 98–107)
CO2 SERPL-SCNC: 34.3 MMOL/L (ref 22–29)
CREAT SERPL-MCNC: 0.59 MG/DL (ref 0.57–1)
DEPRECATED RDW RBC AUTO: 66.2 FL (ref 37–54)
EGFRCR SERPLBLD CKD-EPI 2021: 93.5 ML/MIN/1.73
ERYTHROCYTE [DISTWIDTH] IN BLOOD BY AUTOMATED COUNT: 21.8 % (ref 12.3–15.4)
GLUCOSE SERPL-MCNC: 143 MG/DL (ref 65–99)
HCT VFR BLD AUTO: 28.8 % (ref 34–46.6)
HGB BLD-MCNC: 7.6 G/DL (ref 12–15.9)
MCH RBC QN AUTO: 22.2 PG (ref 26.6–33)
MCHC RBC AUTO-ENTMCNC: 26.4 G/DL (ref 31.5–35.7)
MCV RBC AUTO: 84 FL (ref 79–97)
MRSA DNA SPEC QL NAA+PROBE: ABNORMAL
PLATELET # BLD AUTO: 189 10*3/MM3 (ref 140–450)
PMV BLD AUTO: 10.2 FL (ref 6–12)
POTASSIUM SERPL-SCNC: 4.8 MMOL/L (ref 3.5–5.2)
RBC # BLD AUTO: 3.43 10*6/MM3 (ref 3.77–5.28)
SODIUM SERPL-SCNC: 141 MMOL/L (ref 136–145)
WBC NRBC COR # BLD: 7.86 10*3/MM3 (ref 3.4–10.8)

## 2023-05-23 PROCEDURE — 94799 UNLISTED PULMONARY SVC/PX: CPT

## 2023-05-23 PROCEDURE — 94640 AIRWAY INHALATION TREATMENT: CPT

## 2023-05-23 PROCEDURE — 94761 N-INVAS EAR/PLS OXIMETRY MLT: CPT

## 2023-05-23 PROCEDURE — 87641 MR-STAPH DNA AMP PROBE: CPT | Performed by: INTERNAL MEDICINE

## 2023-05-23 PROCEDURE — 94664 DEMO&/EVAL PT USE INHALER: CPT

## 2023-05-23 PROCEDURE — 85027 COMPLETE CBC AUTOMATED: CPT | Performed by: INTERNAL MEDICINE

## 2023-05-23 PROCEDURE — 80048 BASIC METABOLIC PNL TOTAL CA: CPT | Performed by: INTERNAL MEDICINE

## 2023-05-23 PROCEDURE — 99232 SBSQ HOSP IP/OBS MODERATE 35: CPT | Performed by: FAMILY MEDICINE

## 2023-05-23 PROCEDURE — 94660 CPAP INITIATION&MGMT: CPT

## 2023-05-23 PROCEDURE — 25010000002 METHYLPREDNISOLONE PER 40 MG: Performed by: INTERNAL MEDICINE

## 2023-05-23 PROCEDURE — 25010000002 PIPERACILLIN SOD-TAZOBACTAM PER 1 G: Performed by: INTERNAL MEDICINE

## 2023-05-23 RX ORDER — METHYLPREDNISOLONE SODIUM SUCCINATE 125 MG/2ML
40 INJECTION, POWDER, LYOPHILIZED, FOR SOLUTION INTRAMUSCULAR; INTRAVENOUS EVERY 12 HOURS
Status: DISCONTINUED | OUTPATIENT
Start: 2023-05-24 | End: 2023-05-24

## 2023-05-23 RX ADMIN — CLONAZEPAM 1 MG: 0.5 TABLET ORAL at 00:13

## 2023-05-23 RX ADMIN — METHYLPREDNISOLONE SODIUM SUCCINATE 40 MG: 40 INJECTION, POWDER, LYOPHILIZED, FOR SOLUTION INTRAMUSCULAR; INTRAVENOUS at 12:26

## 2023-05-23 RX ADMIN — APIXABAN 5 MG: 5 TABLET, FILM COATED ORAL at 00:14

## 2023-05-23 RX ADMIN — METHYLPREDNISOLONE SODIUM SUCCINATE 40 MG: 40 INJECTION, POWDER, LYOPHILIZED, FOR SOLUTION INTRAMUSCULAR; INTRAVENOUS at 00:14

## 2023-05-23 RX ADMIN — SENNOSIDES AND DOCUSATE SODIUM 2 TABLET: 50; 8.6 TABLET ORAL at 20:07

## 2023-05-23 RX ADMIN — CETIRIZINE HYDROCHLORIDE 10 MG: 10 TABLET, FILM COATED ORAL at 08:39

## 2023-05-23 RX ADMIN — TAZOBACTAM SODIUM AND PIPERACILLIN SODIUM 3.38 G: 375; 3 INJECTION, SOLUTION INTRAVENOUS at 12:26

## 2023-05-23 RX ADMIN — Medication 1 CAPSULE: at 20:06

## 2023-05-23 RX ADMIN — MUPIROCIN: 20 OINTMENT TOPICAL at 20:07

## 2023-05-23 RX ADMIN — Medication 1 CAPSULE: at 00:13

## 2023-05-23 RX ADMIN — TAZOBACTAM SODIUM AND PIPERACILLIN SODIUM 3.38 G: 375; 3 INJECTION, SOLUTION INTRAVENOUS at 20:07

## 2023-05-23 RX ADMIN — TRAZODONE HYDROCHLORIDE 100 MG: 50 TABLET ORAL at 00:22

## 2023-05-23 RX ADMIN — SENNOSIDES AND DOCUSATE SODIUM 2 TABLET: 50; 8.6 TABLET ORAL at 08:39

## 2023-05-23 RX ADMIN — IPRATROPIUM BROMIDE AND ALBUTEROL SULFATE 3 ML: .5; 3 SOLUTION RESPIRATORY (INHALATION) at 01:03

## 2023-05-23 RX ADMIN — BUDESONIDE INHALATION 0.5 MG: 0.5 SUSPENSION RESPIRATORY (INHALATION) at 01:03

## 2023-05-23 RX ADMIN — CLONAZEPAM 1 MG: 0.5 TABLET ORAL at 20:07

## 2023-05-23 RX ADMIN — APIXABAN 5 MG: 5 TABLET, FILM COATED ORAL at 12:26

## 2023-05-23 RX ADMIN — DULOXETINE HYDROCHLORIDE 60 MG: 30 CAPSULE, DELAYED RELEASE ORAL at 08:39

## 2023-05-23 RX ADMIN — TAZOBACTAM SODIUM AND PIPERACILLIN SODIUM 3.38 G: 375; 3 INJECTION, SOLUTION INTRAVENOUS at 04:07

## 2023-05-23 RX ADMIN — Medication 1 CAPSULE: at 08:39

## 2023-05-23 RX ADMIN — IPRATROPIUM BROMIDE AND ALBUTEROL SULFATE 3 ML: .5; 3 SOLUTION RESPIRATORY (INHALATION) at 07:10

## 2023-05-23 RX ADMIN — BUDESONIDE INHALATION 0.5 MG: 0.5 SUSPENSION RESPIRATORY (INHALATION) at 18:44

## 2023-05-23 RX ADMIN — FLUTICASONE PROPIONATE 2 SPRAY: 50 SPRAY, METERED NASAL at 08:47

## 2023-05-23 RX ADMIN — ATORVASTATIN CALCIUM 20 MG: 20 TABLET, FILM COATED ORAL at 08:39

## 2023-05-23 RX ADMIN — OXYCODONE HYDROCHLORIDE AND ACETAMINOPHEN 1 TABLET: 7.5; 325 TABLET ORAL at 00:14

## 2023-05-23 RX ADMIN — IPRATROPIUM BROMIDE AND ALBUTEROL SULFATE 3 ML: .5; 3 SOLUTION RESPIRATORY (INHALATION) at 13:06

## 2023-05-23 RX ADMIN — PANTOPRAZOLE SODIUM 40 MG: 40 TABLET, DELAYED RELEASE ORAL at 05:54

## 2023-05-23 RX ADMIN — IPRATROPIUM BROMIDE AND ALBUTEROL SULFATE 3 ML: .5; 3 SOLUTION RESPIRATORY (INHALATION) at 18:44

## 2023-05-23 RX ADMIN — TRAZODONE HYDROCHLORIDE 100 MG: 50 TABLET ORAL at 20:07

## 2023-05-23 RX ADMIN — SODIUM CHLORIDE 1000 ML: 9 INJECTION, SOLUTION INTRAVENOUS at 00:33

## 2023-05-23 RX ADMIN — BUDESONIDE INHALATION 0.5 MG: 0.5 SUSPENSION RESPIRATORY (INHALATION) at 07:10

## 2023-05-23 RX ADMIN — Medication 10 ML: at 00:14

## 2023-05-23 RX ADMIN — Medication 10 ML: at 20:07

## 2023-05-23 RX ADMIN — SENNOSIDES AND DOCUSATE SODIUM 1 TABLET: 50; 8.6 TABLET ORAL at 00:14

## 2023-05-23 NOTE — H&P
[facility]   HISTORY AND PHYSICAL      Name:  Gabi Dudley   Age:  78 y.o.  Sex:  female  :  1945  MRN:  [id number]   Visit Number:  [id number]  Admission Date:  2023  Date Of Service:  23  Primary Care Physician:  Paul Quinteros MD    Chief Complaint:     Shortness of breath.    History Of Presenting Illness:      Ms. Dudley is a 78-year-old female with history of atrial fibrillation on apixaban, COPD on home oxygen at 3 L, coronary artery disease, GERD, who was recently discharged from this hospital on 2023 after treatment of COPD exacerbation was brought to the emergency room by EMS with symptoms of shortness of breath that has been worsening in the last couple of days.  Patient states that she has been doing fairly well until about 2 days ago when she developed worsening shortness of breath and cough.  She has been using the flutter valve at home but states that it has been increasing her cough.  According to the daughter, patient does have trilogy machine at home but has not been compliant.  She quit smoking in .  She does live with her daughter and uses a walker for ambulation.  Patient denies any history of fever or chest pain.    In the emergency room, she was afebrile and hemodynamically stable but required 6 L of nasal cannula oxygen to maintain saturations above 94%.  Initial ABG showed a pH of 7.32, PCO2 74, PO2 69 and bicarb of 38 on 6 L of nasal cannula oxygen.  Initial troponin T was 20.  proBNP was 1664.  CMP and CBC was unremarkable except for a white count of 12 and a hemoglobin of 7 which seems to be her baseline.  Blood cultures were drawn in the emergency room.  CT angiogram done in the emergency room was negative for pulmonary embolism but showed right upper middle and lower lobe infiltrate.  Incidental finding of left adrenal lesion at 25 mm was noted.  Patient was given IV antibiotics therapy with Zosyn, morphine and Zofran in the  emergency room and is currently being admitted to the medical floor with telemetry.    Review Of Systems:    All systems were reviewed and negative except as mentioned in history of presenting illness, assessment and plan.    Past Medical History: Patient  has a past medical history of Adrenal adenoma, Anemia, Arrhythmia, Asthma, Atrial fibrillation, Back pain, Benign colonic polyp, Benign tumor of adrenal gland, Cataract, Cholelithiasis, Chronic bronchitis, Chronic bronchitis with COPD (chronic obstructive pulmonary disease), COPD (chronic obstructive pulmonary disease) (2005), Coronary artery disease, Depression, Elevated cholesterol, Environmental and seasonal allergies, Fibromyalgia, Gastritis, Generalized anxiety disorder, GERD (gastroesophageal reflux disease), H/O mammogram, Hemorrhoids, History of blood transfusion (1985), History of blood transfusion (1985), History of echocardiogram, History of endometriosis, History of nuclear stress test, Hypertension, IBS (irritable bowel syndrome), Impaired functional mobility, balance, gait, and endurance, Impaired mobility, Inverted nipple, Kidney disease, Kidney stone, Liver cyst, Nodular radiologic density, Nodule of left lung, On home oxygen therapy, Osteoarthritis, Osteoporosis, PONV (postoperative nausea and vomiting), Renal cyst, Sinus problem, Sinusitis, Skin cancer, SOB (shortness of breath), Tobacco use, Urinary frequency, Vitamin D deficiency, Wears glasses, and Wears partial dentures.    Past Surgical History: Patient  has a past surgical history that includes Appendectomy (1980s); Tonsillectomy (1987); Colonoscopy w/ biopsies and polypectomy; Colonoscopy (03/11/2013); Colonoscopy (06/21/2016); Upper gastrointestinal endoscopy (01/13/2015); Vaginal delivery; Colonoscopy (N/A, 7/24/2019); Cataract extraction (2013); Hysterectomy (1980s); Cardiac catheterization (2003); Abdominal adhesion surgery; cholecystectomy with intraoperative cholangiogram (N/A,  10/30/2020); and Exploratory Laparotomy (N/A, 11/23/2020).    Social History: Patient  reports that she quit smoking about 2 years ago. Her smoking use included cigarettes. She has a 7.50 pack-year smoking history. She has been exposed to tobacco smoke. She has never used smokeless tobacco. She reports that she does not drink alcohol and does not use drugs.    Family History:  Patient family history includes Arthritis in her father; Brain cancer in her father; Colon cancer in her maternal aunt; Hypertension in her father; Lung cancer in her paternal grandfather; Stomach cancer in her brother.    Allergies:      Doxycycline    Home Medications:    Prior to Admission Medications     Prescriptions Last Dose Informant Patient Reported? Taking?    albuterol sulfate  (90 Base) MCG/ACT inhaler   No No    INHALE 2 PUFFS BY MOUTH EVERY FOUR HOURS AS NEEDED FOR WHEEZING    apixaban (Eliquis) 5 MG tablet tablet   No No    Take 1 tablet by mouth Every 12 (Twelve) Hours.    atorvastatin (LIPITOR) 20 MG tablet   No No    TAKE 1 TABLET BY MOUTH DAILY    benzonatate (Tessalon Perles) 100 MG capsule   No No    Take 1 capsule by mouth 3 (Three) Times a Day As Needed for Cough.    Patient not taking:  Reported on 5/9/2023    Budeson-Glycopyrrol-Formoterol (Breztri Aerosphere) 160-9-4.8 MCG/ACT aerosol inhaler   No No    Inhale 2 puffs 2 (Two) Times a Day. Rinse mouth out after use    cetirizine (zyrTEC) 10 MG tablet   No No    Take 1 tablet by mouth Daily.    Cholecalciferol (vitamin D3) 125 MCG (5000 UT) capsule capsule  Self Yes No    Take 1 capsule by mouth Daily.    clonazePAM (KlonoPIN) 1 MG tablet   No No    Take 1 tablet by mouth 2 (Two) Times a Day As Needed for Anxiety.    clotrimazole-betamethasone (Lotrisone) 1-0.05 % cream   No No    Apply  topically to the appropriate area as directed 2 (Two) Times a Day.    Patient not taking:  Reported on 5/9/2023    dilTIAZem CD (CARDIZEM CD) 120 MG 24 hr capsule   No No    Take  2 capsules by mouth Daily.    DULoxetine (CYMBALTA) 60 MG capsule   No No    Take 1 capsule by mouth 2 (Two) Times a Day.    Patient taking differently:  Take 1 capsule by mouth Daily.    ferrous sulfate 324 (65 Fe) MG tablet delayed-release EC tablet   No No    Take 1 tablet by mouth Daily With Breakfast.    fluticasone (FLONASE) 50 MCG/ACT nasal spray  Self, Child Yes No    2 sprays into the nostril(s) as directed by provider Daily.    furosemide (LASIX) 40 MG tablet   No No    Take 1 tablet by mouth Daily for 3 days.    Patient not taking:  Reported on 5/9/2023    guaiFENesin-dextromethorphan (ROBITUSSIN DM) 100-10 MG/5ML syrup   No No    Take 10 mL by mouth Every 6 (Six) Hours As Needed for Cough.    ipratropium-albuterol (DUO-NEB) 0.5-2.5 mg/3 ml nebulizer   No No    USE 3 mL VIA NEBULIZER EVERY 4 HOURS AS NEEDED FOR WHEEZING    methocarbamol (Robaxin) 500 MG tablet   No No    1 qid po prn    O2 (OXYGEN)   Yes No    Inhale 2 L/min As Needed.    ondansetron (ZOFRAN) 4 MG tablet   No No    Take 1 tablet by mouth Every 8 (Eight) Hours As Needed for Nausea or Vomiting.    Patient not taking:  Reported on 5/9/2023    ondansetron ODT (ZOFRAN-ODT) 4 MG disintegrating tablet   No No    Place 1 tablet on the tongue Every 8 (Eight) Hours As Needed for Nausea.    oxyCODONE-acetaminophen (PERCOCET) 7.5-325 MG per tablet   No No    Take 1 tablet by mouth Every 4 (Four) Hours As Needed for Moderate Pain.    pantoprazole (PROTONIX) 40 MG EC tablet   No No    TAKE 1 TABLET BY MOUTH DAILY    sertraline (ZOLOFT) 100 MG tablet   No No    TAKE 1 & 1/2 TABLET BY MOUTH DAILY    sodium chloride 0.65 % nasal spray   No No    2 sprays into the nostril(s) as directed by provider As Needed (dry nose).    traZODone (DESYREL) 100 MG tablet   No No    1 qhs po prn        ED Medications:    Medications   sodium chloride 0.9 % flush 10 mL (has no administration in time range)   piperacillin-tazobactam (ZOSYN) 3.375 g in iso-osmotic dextrose  "50 ml (premix) (has no administration in time range)   Morphine sulfate (PF) injection 2 mg (has no administration in time range)   ondansetron (ZOFRAN) injection 4 mg (has no administration in time range)   LORazepam (ATIVAN) injection 0.5 mg (0.5 mg Intravenous Given 5/22/23 1902)   iopamidol (ISOVUE-300) 61 % injection 100 mL (100 mL Intravenous Given 5/22/23 2029)     Vital Signs:  Temp:  [98.5 °F (36.9 °C)] 98.5 °F (36.9 °C)  Heart Rate:  [80-89] 80  Resp:  [19-20] 19  BP: (119-143)/() 128/70        05/22/23 1805   Weight: 75.6 kg (166 lb 10.7 oz)     Body mass index is 27.73 kg/m².    Physical Exam:     Most recent vital Signs: /70 (BP Location: Left arm, Patient Position: Lying)   Pulse 80   Temp 98.5 °F (36.9 °C) (Oral)   Resp 19   Ht 165.1 cm (65\")   Wt 75.6 kg (166 lb 10.7 oz)   SpO2 98%   BMI 27.73 kg/m²     Physical Exam  Constitutional:       General: She is in acute distress.      Appearance: She is obese. She is ill-appearing.   HENT:      Head: Normocephalic and atraumatic.      Right Ear: External ear normal.      Left Ear: External ear normal.      Nose: Nose normal.      Mouth/Throat:      Mouth: Mucous membranes are moist.   Eyes:      Extraocular Movements: Extraocular movements intact.      Conjunctiva/sclera: Conjunctivae normal.   Cardiovascular:      Rate and Rhythm: Normal rate. Rhythm irregular.      Pulses: Normal pulses.      Heart sounds: Normal heart sounds. No murmur heard.  Pulmonary:      Effort: Pulmonary effort is normal.      Breath sounds: Wheezing and rales present.      Comments: Bilateral scattered wheezing and basal crackles heard.  Abdominal:      General: Bowel sounds are normal.      Palpations: Abdomen is soft.      Tenderness: There is no abdominal tenderness. There is no guarding or rebound.      Comments: Obese abdomen.   Musculoskeletal:         General: Normal range of motion.      Cervical back: Neck supple.      Right lower leg: Edema present. "      Left lower leg: Edema present.      Comments: 2+ pitting edema noted in bilateral lower extremities up to the knees.   Skin:     General: Skin is warm.      Findings: No erythema or rash.   Neurological:      General: No focal deficit present.      Mental Status: She is alert and oriented to person, place, and time. Mental status is at baseline.   Psychiatric:         Mood and Affect: Mood normal.         Behavior: Behavior normal.       Laboratory data:    I have reviewed the labs done in the emergency room.    Results from last 7 days   Lab Units 05/22/23  1811   SODIUM mmol/L 137   POTASSIUM mmol/L 4.5   CHLORIDE mmol/L 95*   CO2 mmol/L 35.0*   BUN mg/dL 24*   CREATININE mg/dL 0.64   CALCIUM mg/dL 8.6   BILIRUBIN mg/dL 0.2   ALK PHOS U/L 70   ALT (SGPT) U/L 30   AST (SGOT) U/L 26   GLUCOSE mg/dL 135*     Results from last 7 days   Lab Units 05/22/23  1811   WBC 10*3/mm3 12.07*   HEMOGLOBIN g/dL 7.0*   HEMATOCRIT % 26.4*   PLATELETS 10*3/mm3 188         Results from last 7 days   Lab Units 05/22/23  1811   HSTROP T ng/L 20*     Results from last 7 days   Lab Units 05/22/23  1811   PROBNP pg/mL 1,664.0       Results from last 7 days   Lab Units 05/22/23  1843   PH, ARTERIAL pH units 7.318*   PO2 ART mm Hg 69.2*   PCO2, ARTERIAL mm Hg 74.3*   HCO3 ART mmol/L 38.1*     EKG:      EKG done in the emergency room was reviewed by me.  It shows sinus rhythm at 87 bpm.  Normal axis.  No significant ST-T changes were noted.    Radiology:    Preliminary report of the CT angiogram of the chest done in the emergency room is as follows:    FINDINGS:  There is a 25 mm hypodense nonspecific left adrenal lesion. There is no axillary adenopathy. There is no hilar or mediastinal adenopathy. The heart size is normal. There is no pericardial or pleural effusion. No filling defects are identified to suggest PE. There is no aortic dissection. Limited images of the upper abdomen are unremarkable. There is hyperinflation. There is  consolidation in the medial right upper lobe, inferior right middle lobe and right lower lobe. No suspicious nodule is identified. Multiple wedge compression fractures appear chronic.  IMPRESSION: No PE or dissection. Patchy airspace consolidation probably related to pneumonia. Follow-up to resolution recommended.    Assessment:    1. Acute on chronic hypoxic and hypercapnic respiratory failure, POA.  2. Acute COPD exacerbation.  3. Right middle and lower lobe healthcare associated pneumonia, unable to classify further, POA.  4. Paroxysmal atrial fibrillation on apixaban.  5. Essential hypertension.  6. Chronic anemia.    Plan:    Respiratory failure/COPD exacerbation/pneumonia.  - Continue nasal cannula oxygen as well as intermittent BiPAP therapy to keep pulse oxygen saturations above 90%.  - Continue bronchodilators, budesonide, IV Solu-Medrol and mucolytic agents.  - Blood cultures have been done in the emergency room and we will obtain sputum culture and nasal swab screen for MRSA.  - Continue IV antibiotics therapy with piperacillin/tazobactam.  - Lactobacillus supplements.    Atrial fibrillation/essential hypertension.  - Continue home medications including apixaban, atorvastatin, Cardizem CD.    I have discussed the patient's condition and treatment plan with her daughter Farida who is at the bedside.  Patient does not have a living will but wants to be full code.    Discussed with nursing staff at the bedside.    Risk Assessment: Moderate  DVT Prophylaxis: Apixaban  Code Status: Full  Diet: Cardiac    Advance Care Planning      ACP discussion was held with the patient during this visit. Patient does not have an advance directive, information provided.         Lenny Dewitt MD  05/22/23  22:21 EDT    Dictated utilizing Dragon dictation.

## 2023-05-23 NOTE — PLAN OF CARE
Problem: Adult Inpatient Plan of Care  Goal: Plan of Care Review  Outcome: Ongoing, Progressing     Problem: Fall Injury Risk  Goal: Absence of Fall and Fall-Related Injury  Outcome: Ongoing, Progressing  Intervention: Identify and Manage Contributors  Recent Flowsheet Documentation  Taken 5/23/2023 0200 by Ashleigh Pedro RN  Medication Review/Management: medications reviewed  Intervention: Promote Injury-Free Environment  Recent Flowsheet Documentation  Taken 5/23/2023 0200 by Ashleigh Pedro RN  Safety Promotion/Fall Prevention:   activity supervised   assistive device/personal items within reach   clutter free environment maintained   safety round/check completed     Problem: Pain Acute  Goal: Acceptable Pain Control and Functional Ability  Outcome: Ongoing, Progressing  Intervention: Prevent or Manage Pain  Recent Flowsheet Documentation  Taken 5/23/2023 0200 by Ashleigh Pedro RN  Medication Review/Management: medications reviewed     Problem: Gas Exchange Impaired  Goal: Optimal Gas Exchange  Outcome: Ongoing, Progressing  Intervention: Optimize Oxygenation and Ventilation  Recent Flowsheet Documentation  Taken 5/23/2023 0200 by Ashleigh Pedro RN  Head of Bed (HOB) Positioning: HOB elevated     Problem: Fluid Imbalance (Pneumonia)  Goal: Fluid Balance  Outcome: Ongoing, Progressing     Problem: Respiratory Compromise (Pneumonia)  Goal: Effective Oxygenation and Ventilation  Outcome: Ongoing, Progressing  Intervention: Promote Airway Secretion Clearance  Recent Flowsheet Documentation  Taken 5/23/2023 0100 by Ashleigh Pedro RN  Cough And Deep Breathing: done independently per patient  Intervention: Optimize Oxygenation and Ventilation  Recent Flowsheet Documentation  Taken 5/23/2023 0200 by Ashleigh Pedro RN  Head of Bed (HOB) Positioning: [facility] elevated   Goal Outcome Evaluation:      Plan of care reviewed with patient. Daughter present at bedside during admission. Patient admitted for  Respiratory Failure. Patient is frustrated on arrival to unit and  wants to be left alone. Reassured patient and provided emotional support during admission process. Patient arrived on 6L NC which was able to be titrated down to 4L NC. With exertion patient will desat. Patient states she is noncompliant with her CPAP at home. Daughter verbalizes patient has had some dental issues which has caused some chewing and swallowing difficulty. Patient passed bedside swallow. SLP consult placed. Patient did rest on bipap later tonight. Need for respiratory culture, cough is currently nonproductive. No other significant events this shift.

## 2023-05-23 NOTE — PROGRESS NOTES
[facility]    Name:  Gabi Dudley   Age:  78 y.o.  Sex:  female  :  1945  MRN:  [id number]   Visit Number:  [id number]  Admission Date:  2023  Date Of Service:  23  Primary Care Physician:  Paul Quinteros MD     LOS: 1 day :    Chief Complaint:      Shortness of breath.    Subjective:    Patient was seen and examined at bedside.  Patient laying comfortably in bed and sleeping when I entered the room.  Patient easily arousable.  She denies any concerns today.  Reports improvement overall when asked.  Patient only requesting that she wants to sleep this morning.  No other complaints or pain.  Patient on 4 L nasal cannula otherwise afebrile and hemodynamically stable.    Hospital Course:    Ms. Dudley is a 78-year-old female with history of atrial fibrillation on apixaban, COPD on home oxygen at 3 L, coronary artery disease, GERD, who was recently discharged from this hospital on 2023 after treatment of COPD exacerbation was brought to the emergency room by EMS with symptoms of shortness of breath that has been worsening in the last couple of days.  Patient states that she has been doing fairly well until about 2 days ago when she developed worsening shortness of breath and cough.  She has been using the flutter valve at home but states that it has been increasing her cough.  According to the daughter, patient does have trilogy machine at home but has not been compliant.  She quit smoking in .  She does live with her daughter and uses a walker for ambulation.  Patient denies any history of fever or chest pain.    Patient was discharged to [facility] on her last admission, she stayed there for a day then signed herself AGAINST MEDICAL ADVICE and went home.      In the emergency room, she was afebrile and hemodynamically stable but required 6 L of nasal cannula oxygen to maintain saturations above 94%.  Initial ABG showed a pH of 7.32, PCO2  74, PO2 69 and bicarb of 38 on 6 L of nasal cannula oxygen.  Initial troponin T was 20.  proBNP was 1664.  CMP and CBC was unremarkable except for a white count of 12 and a hemoglobin of 7 which seems to be her baseline.  Blood cultures were drawn in the emergency room.  CT angiogram done in the emergency room was negative for pulmonary embolism but showed right upper middle and lower lobe infiltrate.  Incidental finding of left adrenal lesion at 25 mm was noted.  Patient was given IV antibiotics therapy with Zosyn, morphine and Zofran in the emergency room and is currently being admitted to the medical floor with telemetry.    Review of Systems:     All systems were reviewed and negative except as mentioned in subjective, assessment and plan.    Vital Signs:    Temp:  [97.2 °F (36.2 °C)-98.5 °F (36.9 °C)] 97.5 °F (36.4 °C)  Heart Rate:  [78-89] 79  Resp:  [16-20] 18  BP: (119-143)/() 128/66    Intake and output:    I/O last 3 completed shifts:  In: 50 [IV Piggyback:50]  Out: -   No intake/output data recorded.    Physical Examination:    General Appearance:  Alert and cooperative.  Chronically ill-appearing.  No acute distress.   Head:  Atraumatic and normocephalic.   Eyes: Conjunctivae and sclerae normal, no icterus. No pallor.   Throat: No oral lesions, no thrush, oral mucosa moist.   Neck: Supple, trachea midline, no thyromegaly.   Lungs:   Breath sounds heard bilaterally equally.  Expiratory wheezing with faint bibasilar crackles heard.  Prolonged expiration.  Unlabored, nontachypneic.  On supplemental oxygen.    Heart:  Normal S1 and S2, no murmur, no gallop, no rub. No JVD.   Abdomen:   Normal bowel sounds, no masses, no organomegaly. Soft, nontender, nondistended, no rebound tenderness.   Extremities: Supple, no edema, no cyanosis, no clubbing.   Skin: No bleeding or rash.   Neurologic: Alert and oriented x 3. No facial asymmetry. Moves all four limbs. No tremors.      Laboratory results:    Results  from last 7 days   Lab Units 05/23/23  0632 05/22/23  1811   SODIUM mmol/L 141 137   POTASSIUM mmol/L 4.8 4.5   CHLORIDE mmol/L 100 95*   CO2 mmol/L 34.3* 35.0*   BUN mg/dL 18 24*   CREATININE mg/dL 0.59 0.64   CALCIUM mg/dL 8.5* 8.6   BILIRUBIN mg/dL  --  0.2   ALK PHOS U/L  --  70   ALT (SGPT) U/L  --  30   AST (SGOT) U/L  --  26   GLUCOSE mg/dL 143* 135*     Results from last 7 days   Lab Units 05/23/23  0632 05/22/23  1811   WBC 10*3/mm3 7.86 12.07*   HEMOGLOBIN g/dL 7.6* 7.0*   HEMATOCRIT % 28.8* 26.4*   PLATELETS 10*3/mm3 189 188         Results from last 7 days   Lab Units 05/22/23  1811   HSTROP T ng/L 20*         Recent Labs     11/21/22  1027 01/23/23  0050 05/22/23  1843   PHART 7.443 7.334* 7.318*   ONS6PLF 47.0* 67.6* 74.3*   PO2ART 68.9* 156.0* 69.2*   LKH3KCO 32.1* 36.0* 38.1*   BASEEXCESS 7.1* 8.7* 10.6*      I have reviewed the patient's laboratory results.    Radiology results:    XR Chest 1 View    Result Date: 5/23/2023  CLINICAL INDICATION:  SOA Triage Protocol shortness of breath.  EXAMINATION TECHNIQUE: XR CHEST 1 VW-  COMPARISON: Radiographs 05/05/2023.  FINDINGS: Right mid and lower lung zone ill-defined airspace opacities. Severe emphysema. Mild bibasilar scarring/atelectasis with increased reticulation. Mild cardiomegaly. Aortic atherosclerosis. Enlarged pulmonary arteries, suggestive of pulmonary arterial hypertension. Bilateral perihilar vascular crowding. No acute osseous or soft tissue abnormality. No free air under hemidiaphragms.      Impression: Right mid and lower lung zone ill-defined airspace opacities, concerning for pneumonia. Recommended follow up to resolution.  Images personally reviewed, interpreted and dictated by SHAMIKA Sanchez.       This report was signed and finalized on 5/23/2023 8:24 AM by SHAMIKA Sanchez.    CT Angiogram Chest Pulmonary Embolism    Result Date: 5/22/2023  FINAL REPORT TECHNIQUE: Postcontrast axial images of the chest were performed in a  CTA protocol. The study was performed with techniques to keep radiation dose as low as reasonably achievable, (ALARA). Individual dose reduction techniques using automated exposure control or adjustment of mA and/or kV according to the patient's size were employed. CLINICAL HISTORY: soa  weakness, mid-sternal cp, pressure FINDINGS: There is a 25 mm hypodense nonspecific left adrenal lesion. There is no axillary adenopathy.  There is no hilar or mediastinal adenopathy.  The heart size is normal.  There is no pericardial or pleural effusion.  No filling defects are identified to suggest PE.  There is no aortic dissection. Limited images of the upper abdomen are unremarkable.  There is hyperinflation.  There is consolidation in the medial right upper lobe, inferior right middle lobe and right lower lobe.  No suspicious nodule is identified.  Multiple wedge compression fractures appear chronic.     Impression: No PE or dissection.  Patchy airspace consolidation probably related to pneumonia.  Follow-up to resolution recommended. Authenticated and Electronically Signed by Jasiel Browne MD on 05/22/2023 10:34:38 PM    I have reviewed the patient's radiology reports.    Medication Review:     I have reviewed the patient's active and prn medications.     Problem List:      Acute respiratory failure with hypoxia and hypercapnia    Adrenal adenoma    Essential hypertension    Paroxysmal atrial fibrillation (HCC)    COPD exacerbation    Acute on chronic respiratory failure with hypoxia and hypercapnia    Impaired mobility and ADLs    Aspiration pneumonia of right lower lobe      Assessment:    1. Acute on chronic hypoxic and hypercapnic respiratory failure, POA.  2. Acute COPD exacerbation.  3. Right middle and lower lobe healthcare associated pneumonia, unable to classify further, POA.  4. Paroxysmal atrial fibrillation on apixaban.  5. Essential hypertension.  6. Chronic anemia.    Plan:    Respiratory failure/COPD  exacerbation/pneumonia.  - Continue nasal cannula oxygen as well as intermittent BiPAP therapy to keep pulse oxygen saturations above 90%.  Continue to titrate down as able to.  Patient on 3 L of oxygen at baseline.  - Continue bronchodilators, budesonids and mucolytic agents.  - follow-up on blood and sputum culture.  - nasal swab screen for MRSA positive, likely colonization, started Bactroban  - Continue IV antibiotics therapy with piperacillin/tazobactam x 5 days  - Lactobacillus supplements.     Atrial fibrillation/essential hypertension.  - Continue home medications including apixaban, atorvastatin, Cardizem CD.    Adrenal lesion on imaging  -Incidental finding of left adrenal lesion at 25 mm was noted.   -Follow-up as an outpatient.      Further orders as indicated per clinical course.     DVT Prophylaxis: Apixaban  Code Status: Full  Diet: Cardiac  Discharge Plan: Patient is willing to go home with her daughter on discharge.  Patient will likely need 1 to 2 days in the hospital.  Will discuss home health with the patient.    Tom Keyes MD  05/23/23  10:21 EDT    Dictated utilizing Dragon dictation.

## 2023-05-23 NOTE — PLAN OF CARE
Goal Outcome Evaluation:  Plan of Care Reviewed With: patient        Progress: no change  Outcome Evaluation: Patient got new IV access this afternoon. IV anbx administered as ordered. Patient put in contact isolation; see results. Will continue to monitor.

## 2023-05-23 NOTE — PROGRESS NOTES
Pharmacokinetic Consult - Piperacillin-tazobactam Dosing    Gabi Dudley is a 78 y.o. female who has been consulted to dose piperacillin-tazobactam for pneumonia.    Current Antimicrobial Therapy    Anti-Infectives (From admission, onward)      Ordered     Dose/Rate Route Frequency Start Stop    05/23/23 0001  piperacillin-tazobactam (ZOSYN) 3.375 g in iso-osmotic dextrose 50 ml (premix)        Ordering Provider: Lenny Dewitt MD    3.375 g  over 4 Hours Intravenous Every 8 Hours 05/23/23 0400 05/28/23 0359    05/22/23 2349  Pharmacy to Dose Zosyn        Ordering Provider: Lenny Dewitt MD     Does not apply Continuous PRN 05/22/23 2349 05/27/23 2348 05/22/23 2201  piperacillin-tazobactam (ZOSYN) 3.375 g in iso-osmotic dextrose 50 ml (premix)        Ordering Provider: Maximo Narayanan Jr., PA-C    3.375 g  over 30 Minutes Intravenous Once 05/22/23 2203 05/22/23 2301            Microbiology Results (last 10 days)       ** No results found for the last 240 hours. **             Allergies  Doxycycline    Relevant clinical data and objective history reviewed:  Creatinine   Date Value Ref Range Status   05/22/2023 0.64 0.57 - 1.00 mg/dL Final   07/02/2020 0.90 0.60 - 1.30 mg/dL Final     Comment:     Serial Number: [id number]:  [id number]     Estimated Creatinine Clearance: 76 mL/min (by C-G formula based on SCr of 0.64 mg/dL).  No intake/output data recorded.  Patient weight: 75.6 kg (166 lb 10.7 oz)    Asessment/Plan  Initiate piperacillin-tazobactam 3.375 g IV every 8 hours  Pharmacy will monitor Ms. Dudley's renal function and clinical status and adjust the piperacillin-tazobactam dose and/or frequency as needed.    Thank you,    Sarah Celeste [facility],PharmD  5/23/2023  00:04 EDT

## 2023-05-23 NOTE — THERAPY EVALUATION
Patient unavailable for OT eval this am d/t nursing starting IV; declined participation this afternoon d/t fatigue. Requests therapist check back tomorrow.

## 2023-05-23 NOTE — THERAPY EVALUATION
Attempted PT evaluation this date x 2. In am pt was in process of getting new IV placement and in pm pt states she is too fatigued to begin PT services this date. Per pt care tech pt had been up in the chair for awhile this p.m and did well with transfer. Will plan check back tomorrow.

## 2023-05-23 NOTE — THERAPY EVALUATION
Attempted eval this a.m. but pt. too tired to participate and requested SLP to come back later. Will f/u to see if pt. more willing to participate later.

## 2023-05-24 LAB
ANION GAP SERPL CALCULATED.3IONS-SCNC: 7.6 MMOL/L (ref 5–15)
BUN SERPL-MCNC: 20 MG/DL (ref 8–23)
BUN/CREAT SERPL: 33.3 (ref 7–25)
CALCIUM SPEC-SCNC: 8.4 MG/DL (ref 8.6–10.5)
CHLORIDE SERPL-SCNC: 104 MMOL/L (ref 98–107)
CO2 SERPL-SCNC: 32.4 MMOL/L (ref 22–29)
CREAT SERPL-MCNC: 0.6 MG/DL (ref 0.57–1)
DEPRECATED RDW RBC AUTO: 64.8 FL (ref 37–54)
EGFRCR SERPLBLD CKD-EPI 2021: 93.2 ML/MIN/1.73
ERYTHROCYTE [DISTWIDTH] IN BLOOD BY AUTOMATED COUNT: 21.8 % (ref 12.3–15.4)
GLUCOSE SERPL-MCNC: 144 MG/DL (ref 65–99)
HCT VFR BLD AUTO: 26.2 % (ref 34–46.6)
HGB BLD-MCNC: 7.1 G/DL (ref 12–15.9)
MCH RBC QN AUTO: 22.3 PG (ref 26.6–33)
MCHC RBC AUTO-ENTMCNC: 27.1 G/DL (ref 31.5–35.7)
MCV RBC AUTO: 82.1 FL (ref 79–97)
PLATELET # BLD AUTO: 200 10*3/MM3 (ref 140–450)
PMV BLD AUTO: 10 FL (ref 6–12)
POTASSIUM SERPL-SCNC: 4 MMOL/L (ref 3.5–5.2)
PROCALCITONIN SERPL-MCNC: 0.07 NG/ML (ref 0–0.25)
RBC # BLD AUTO: 3.19 10*6/MM3 (ref 3.77–5.28)
SODIUM SERPL-SCNC: 144 MMOL/L (ref 136–145)
WBC NRBC COR # BLD: 8.6 10*3/MM3 (ref 3.4–10.8)

## 2023-05-24 PROCEDURE — 94664 DEMO&/EVAL PT USE INHALER: CPT

## 2023-05-24 PROCEDURE — 80048 BASIC METABOLIC PNL TOTAL CA: CPT | Performed by: FAMILY MEDICINE

## 2023-05-24 PROCEDURE — 94799 UNLISTED PULMONARY SVC/PX: CPT

## 2023-05-24 PROCEDURE — 63710000001 PREDNISONE PER 1 MG: Performed by: FAMILY MEDICINE

## 2023-05-24 PROCEDURE — 94761 N-INVAS EAR/PLS OXIMETRY MLT: CPT

## 2023-05-24 PROCEDURE — 99232 SBSQ HOSP IP/OBS MODERATE 35: CPT | Performed by: FAMILY MEDICINE

## 2023-05-24 PROCEDURE — 25010000002 NA FERRIC GLUC CPLX PER 12.5 MG: Performed by: FAMILY MEDICINE

## 2023-05-24 PROCEDURE — 85027 COMPLETE CBC AUTOMATED: CPT | Performed by: FAMILY MEDICINE

## 2023-05-24 PROCEDURE — 25010000002 METHYLPREDNISOLONE PER 125 MG: Performed by: INTERNAL MEDICINE

## 2023-05-24 PROCEDURE — 84145 PROCALCITONIN (PCT): CPT | Performed by: FAMILY MEDICINE

## 2023-05-24 PROCEDURE — 97165 OT EVAL LOW COMPLEX 30 MIN: CPT

## 2023-05-24 PROCEDURE — 25010000002 PIPERACILLIN SOD-TAZOBACTAM PER 1 G: Performed by: INTERNAL MEDICINE

## 2023-05-24 PROCEDURE — 94660 CPAP INITIATION&MGMT: CPT

## 2023-05-24 PROCEDURE — 97161 PT EVAL LOW COMPLEX 20 MIN: CPT

## 2023-05-24 PROCEDURE — 92610 EVALUATE SWALLOWING FUNCTION: CPT

## 2023-05-24 RX ORDER — DILTIAZEM HYDROCHLORIDE 5 MG/ML
10 INJECTION INTRAVENOUS ONCE
Status: COMPLETED | OUTPATIENT
Start: 2023-05-24 | End: 2023-05-24

## 2023-05-24 RX ORDER — PREDNISONE 20 MG/1
40 TABLET ORAL
Status: DISCONTINUED | OUTPATIENT
Start: 2023-05-24 | End: 2023-05-25 | Stop reason: HOSPADM

## 2023-05-24 RX ORDER — FERROUS SULFATE TAB EC 324 MG (65 MG FE EQUIVALENT) 324 (65 FE) MG
324 TABLET DELAYED RESPONSE ORAL
Status: DISCONTINUED | OUTPATIENT
Start: 2023-05-25 | End: 2023-05-25 | Stop reason: HOSPADM

## 2023-05-24 RX ORDER — DILTIAZEM HYDROCHLORIDE 240 MG/1
240 CAPSULE, COATED, EXTENDED RELEASE ORAL
Status: DISCONTINUED | OUTPATIENT
Start: 2023-05-24 | End: 2023-05-25 | Stop reason: HOSPADM

## 2023-05-24 RX ADMIN — BUDESONIDE INHALATION 0.5 MG: 0.5 SUSPENSION RESPIRATORY (INHALATION) at 20:23

## 2023-05-24 RX ADMIN — PANTOPRAZOLE SODIUM 40 MG: 40 TABLET, DELAYED RELEASE ORAL at 06:04

## 2023-05-24 RX ADMIN — SENNOSIDES AND DOCUSATE SODIUM 2 TABLET: 50; 8.6 TABLET ORAL at 08:57

## 2023-05-24 RX ADMIN — METHYLPREDNISOLONE SODIUM SUCCINATE 40 MG: 125 INJECTION, POWDER, FOR SOLUTION INTRAMUSCULAR; INTRAVENOUS at 00:25

## 2023-05-24 RX ADMIN — IPRATROPIUM BROMIDE AND ALBUTEROL SULFATE 3 ML: .5; 3 SOLUTION RESPIRATORY (INHALATION) at 12:26

## 2023-05-24 RX ADMIN — APIXABAN 5 MG: 5 TABLET, FILM COATED ORAL at 06:04

## 2023-05-24 RX ADMIN — Medication 1 CAPSULE: at 20:03

## 2023-05-24 RX ADMIN — Medication 1 CAPSULE: at 08:56

## 2023-05-24 RX ADMIN — DILTIAZEM HYDROCHLORIDE 240 MG: 240 CAPSULE, COATED, EXTENDED RELEASE ORAL at 20:03

## 2023-05-24 RX ADMIN — POLYETHYLENE GLYCOL 3350 17 G: 17 POWDER, FOR SOLUTION ORAL at 09:05

## 2023-05-24 RX ADMIN — DILTIAZEM HYDROCHLORIDE 10 MG: 5 INJECTION INTRAVENOUS at 18:19

## 2023-05-24 RX ADMIN — MUPIROCIN: 20 OINTMENT TOPICAL at 08:56

## 2023-05-24 RX ADMIN — TAZOBACTAM SODIUM AND PIPERACILLIN SODIUM 3.38 G: 375; 3 INJECTION, SOLUTION INTRAVENOUS at 20:03

## 2023-05-24 RX ADMIN — Medication 10 ML: at 20:04

## 2023-05-24 RX ADMIN — IPRATROPIUM BROMIDE AND ALBUTEROL SULFATE 3 ML: .5; 3 SOLUTION RESPIRATORY (INHALATION) at 20:23

## 2023-05-24 RX ADMIN — IPRATROPIUM BROMIDE AND ALBUTEROL SULFATE 3 ML: .5; 3 SOLUTION RESPIRATORY (INHALATION) at 06:52

## 2023-05-24 RX ADMIN — TRAZODONE HYDROCHLORIDE 100 MG: 50 TABLET ORAL at 20:03

## 2023-05-24 RX ADMIN — IPRATROPIUM BROMIDE AND ALBUTEROL SULFATE 3 ML: .5; 3 SOLUTION RESPIRATORY (INHALATION) at 00:27

## 2023-05-24 RX ADMIN — TAZOBACTAM SODIUM AND PIPERACILLIN SODIUM 3.38 G: 375; 3 INJECTION, SOLUTION INTRAVENOUS at 13:00

## 2023-05-24 RX ADMIN — TAZOBACTAM SODIUM AND PIPERACILLIN SODIUM 3.38 G: 375; 3 INJECTION, SOLUTION INTRAVENOUS at 04:35

## 2023-05-24 RX ADMIN — ATORVASTATIN CALCIUM 20 MG: 20 TABLET, FILM COATED ORAL at 08:56

## 2023-05-24 RX ADMIN — Medication 10 ML: at 09:01

## 2023-05-24 RX ADMIN — DULOXETINE HYDROCHLORIDE 60 MG: 30 CAPSULE, DELAYED RELEASE ORAL at 08:57

## 2023-05-24 RX ADMIN — MUPIROCIN: 20 OINTMENT TOPICAL at 20:03

## 2023-05-24 RX ADMIN — PREDNISONE 40 MG: 20 TABLET ORAL at 08:57

## 2023-05-24 RX ADMIN — APIXABAN 5 MG: 5 TABLET, FILM COATED ORAL at 17:35

## 2023-05-24 RX ADMIN — OXYCODONE HYDROCHLORIDE AND ACETAMINOPHEN 1 TABLET: 7.5; 325 TABLET ORAL at 16:18

## 2023-05-24 RX ADMIN — CLONAZEPAM 1 MG: 0.5 TABLET ORAL at 20:03

## 2023-05-24 RX ADMIN — CETIRIZINE HYDROCHLORIDE 10 MG: 10 TABLET, FILM COATED ORAL at 08:56

## 2023-05-24 RX ADMIN — SODIUM CHLORIDE 125 MG: 9 INJECTION, SOLUTION INTRAVENOUS at 11:09

## 2023-05-24 RX ADMIN — FLUTICASONE PROPIONATE 2 SPRAY: 50 SPRAY, METERED NASAL at 08:58

## 2023-05-24 RX ADMIN — BUDESONIDE INHALATION 0.5 MG: 0.5 SUSPENSION RESPIRATORY (INHALATION) at 06:52

## 2023-05-24 NOTE — PLAN OF CARE
Goal Outcome Evaluation:  Plan of Care Reviewed With: patient        Progress: improving  Outcome Evaluation: VSS. Pt on 4 liters nasal cannula.

## 2023-05-24 NOTE — PLAN OF CARE
Goal Outcome Evaluation:  Plan of Care Reviewed With: patient        Progress: no change  Outcome Evaluation: OT eval completed. Patient supine in bed, nursing present at bedside. Patient Ox4, no complaints of pain, on 4L. Patient moved to EOB with modified ind. Patient transferred from EOB to BSC with SBA without AD, performed clothing management, perineal hygiene with SBA-CGA. Patient transferred from BSC to chair, declined additional mobility. Patient reports she lives in the den of her daughter's home and has all necessary DME, but wishes to have caregiver a couple hours a day to assist with self care while her daughter is at work. Patient is expected to benefit from continued OT services prior to DC and would benefit from HH services.

## 2023-05-24 NOTE — PROGRESS NOTES
[facility]    Name:  Gabi Dudley   Age:  78 y.o.  Sex:  female  :  1945  MRN:  [id number]   Visit Number:  [id number]  Admission Date:  2023  Date Of Service:  23  Primary Care Physician:  Paul Quinteros MD     LOS: 2 days :    Chief Complaint:      Shortness of breath.    Subjective:    Patient was seen and examined at bedside.  Patient laying comfortably in bed and sleeping when I entered the room.  Patient easily arousable and conversational.  Patient reports overall improvement today however still has generalized weakness and feels sleepy.  Patient reports concerns that she has no one to take care of her at home if she gets discharged today, she is planning on getting things set up for her to go home hopefully in the morning if she continues to do better.  Patient denies any pain or discomfort. Patient on 4 L nasal cannula otherwise afebrile and hemodynamically stable.    Hospital Course:    Ms. Dudley is a 78-year-old female with history of atrial fibrillation on apixaban, COPD on home oxygen at 3 L, coronary artery disease, GERD, who was recently discharged from this hospital on 2023 after treatment of COPD exacerbation was brought to the emergency room by EMS with symptoms of shortness of breath that has been worsening in the last couple of days.  Patient states that she has been doing fairly well until about 2 days ago when she developed worsening shortness of breath and cough.  She has been using the flutter valve at home but states that it has been increasing her cough.  According to the daughter, patient does have trilogy machine at home but has not been compliant.  She quit smoking in .  She does live with her daughter and uses a walker for ambulation.  Patient denies any history of fever or chest pain.    Patient was discharged to [facility] on her last admission, she stayed there for a day then signed herself AGAINST  MEDICAL ADVICE and went home.      In the emergency room, she was afebrile and hemodynamically stable but required 6 L of nasal cannula oxygen to maintain saturations above 94%.  Initial ABG showed a pH of 7.32, PCO2 74, PO2 69 and bicarb of 38 on 6 L of nasal cannula oxygen.  Initial troponin T was 20.  proBNP was 1664.  CMP and CBC was unremarkable except for a white count of 12 and a hemoglobin of 7 which seems to be her baseline.  Blood cultures were drawn in the emergency room.  CT angiogram done in the emergency room was negative for pulmonary embolism but showed right upper middle and lower lobe infiltrate.  Incidental finding of left adrenal lesion at 25 mm was noted.  Patient was given IV antibiotics therapy with Zosyn, morphine and Zofran in the emergency room and is currently being admitted to the medical floor with telemetry.    Review of Systems:     All systems were reviewed and negative except as mentioned in subjective, assessment and plan.    Vital Signs:    Temp:  [97.6 °F (36.4 °C)-99.1 °F (37.3 °C)] 97.6 °F (36.4 °C)  Heart Rate:  [] 98  Resp:  [16-26] 16  BP: (123-135)/(61-78) 135/61    Intake and output:    I/O last 3 completed shifts:  In: 1250 [P.O.:1200; IV Piggyback:50]  Out: -   I/O this shift:  In: 240 [P.O.:240]  Out: -     Physical Examination:    General Appearance:  Alert and cooperative.  Chronically ill-appearing.  No acute distress.   Head:  Atraumatic and normocephalic.   Eyes: Conjunctivae and sclerae normal, no icterus. No pallor.   Throat: No oral lesions, no thrush, oral mucosa moist.   Neck: Supple, trachea midline, no thyromegaly.   Lungs:   Breath sounds heard bilaterally equally. expiratory wheezing with faint bibasilar crackles heard, improving.  Prolonged expiration.  Unlabored, nontachypneic.  On supplemental oxygen.    Heart:  Normal S1 and S2, no murmur, no gallop, no rub. No JVD.   Abdomen:   Normal bowel sounds, no masses, no organomegaly. Soft, nontender,  nondistended, no rebound tenderness.   Extremities: Supple, no edema, no cyanosis, no clubbing.   Skin: No bleeding or rash.   Neurologic: Alert and oriented x 3. No facial asymmetry. Moves all four limbs. No tremors.      Laboratory results:    Results from last 7 days   Lab Units 05/24/23  0622 05/23/23  0632 05/22/23  1811   SODIUM mmol/L 144 141 137   POTASSIUM mmol/L 4.0 4.8 4.5   CHLORIDE mmol/L 104 100 95*   CO2 mmol/L 32.4* 34.3* 35.0*   BUN mg/dL 20 18 24*   CREATININE mg/dL 0.60 0.59 0.64   CALCIUM mg/dL 8.4* 8.5* 8.6   BILIRUBIN mg/dL  --   --  0.2   ALK PHOS U/L  --   --  70   ALT (SGPT) U/L  --   --  30   AST (SGOT) U/L  --   --  26   GLUCOSE mg/dL 144* 143* 135*     Results from last 7 days   Lab Units 05/24/23  0622 05/23/23  0632 05/22/23  1811   WBC 10*3/mm3 8.60 7.86 12.07*   HEMOGLOBIN g/dL 7.1* 7.6* 7.0*   HEMATOCRIT % 26.2* 28.8* 26.4*   PLATELETS 10*3/mm3 200 189 188         Results from last 7 days   Lab Units 05/22/23  1811   HSTROP T ng/L 20*     Results from last 7 days   Lab Units 05/22/23  2217 05/22/23  2204   BLOODCX  No growth at 24 hours No growth at 24 hours     Recent Labs     11/21/22  1027 01/23/23  0050 05/22/23  1843   PHART 7.443 7.334* 7.318*   OKP7GLV 47.0* 67.6* 74.3*   PO2ART 68.9* 156.0* 69.2*   UWK4XCW 32.1* 36.0* 38.1*   BASEEXCESS 7.1* 8.7* 10.6*      I have reviewed the patient's laboratory results.    Radiology results:    XR Chest 1 View    Result Date: 5/23/2023  CLINICAL INDICATION:  SOA Triage Protocol shortness of breath.  EXAMINATION TECHNIQUE: XR CHEST 1 VW-  COMPARISON: Radiographs 05/05/2023.  FINDINGS: Right mid and lower lung zone ill-defined airspace opacities. Severe emphysema. Mild bibasilar scarring/atelectasis with increased reticulation. Mild cardiomegaly. Aortic atherosclerosis. Enlarged pulmonary arteries, suggestive of pulmonary arterial hypertension. Bilateral perihilar vascular crowding. No acute osseous or soft tissue abnormality. No free air  under hemidiaphragms.      Impression: Right mid and lower lung zone ill-defined airspace opacities, concerning for pneumonia. Recommended follow up to resolution.  Images personally reviewed, interpreted and dictated by SHAMIKA Sanchez.       This report was signed and finalized on 5/23/2023 8:24 AM by SHAMIKA Sanchez.    CT Angiogram Chest Pulmonary Embolism    Result Date: 5/22/2023  FINAL REPORT TECHNIQUE: Postcontrast axial images of the chest were performed in a CTA protocol. The study was performed with techniques to keep radiation dose as low as reasonably achievable, (ALARA). Individual dose reduction techniques using automated exposure control or adjustment of mA and/or kV according to the patient's size were employed. CLINICAL HISTORY: soa  weakness, mid-sternal cp, pressure FINDINGS: There is a 25 mm hypodense nonspecific left adrenal lesion. There is no axillary adenopathy.  There is no hilar or mediastinal adenopathy.  The heart size is normal.  There is no pericardial or pleural effusion.  No filling defects are identified to suggest PE.  There is no aortic dissection. Limited images of the upper abdomen are unremarkable.  There is hyperinflation.  There is consolidation in the medial right upper lobe, inferior right middle lobe and right lower lobe.  No suspicious nodule is identified.  Multiple wedge compression fractures appear chronic.     Impression: No PE or dissection.  Patchy airspace consolidation probably related to pneumonia.  Follow-up to resolution recommended. Authenticated and Electronically Signed by Jasiel Browne MD on 05/22/2023 10:34:38 PM    I have reviewed the patient's radiology reports.    Medication Review:     I have reviewed the patient's active and prn medications.     Problem List:      Acute respiratory failure with hypoxia and hypercapnia    Adrenal adenoma    Essential hypertension    Paroxysmal atrial fibrillation (HCC)    COPD exacerbation    Acute on  chronic respiratory failure with hypoxia and hypercapnia    Impaired mobility and ADLs    Aspiration pneumonia of right lower lobe      Assessment:    1. Acute on chronic hypoxic and hypercapnic respiratory failure, POA.  2. Acute COPD exacerbation.  3. Right middle and lower lobe healthcare associated pneumonia, unable to classify further, POA.  4. Paroxysmal atrial fibrillation on apixaban.  5. Essential hypertension.  6. Chronic anemia.    Plan:    Respiratory failure/COPD exacerbation/pneumonia.  - Continue nasal cannula oxygen as well as intermittent BiPAP therapy to keep pulse oxygen saturations above 90%.  Continue to titrate down as able to.  Patient on 3 L of oxygen at baseline.  - Continue bronchodilators, budesonids and mucolytic agents.  - follow-up on blood and sputum culture.  - nasal swab screen for MRSA positive, likely colonization, started Bactroban  - Continue IV antibiotics therapy with piperacillin/tazobactam x 5 days, plan on switching her to p.o. antibiotics on discharge  - Lactobacillus supplements.  -Tapering Solu-Medrol down to p.o. prednisone 40 mg daily.    Chronic iron deficiency anemia  -Hemoglobin at 7.1, no overt bleeding, chronically low per previous admissions.  -We will give IV iron x1  -Restart iron supplements     Atrial fibrillation/essential hypertension.  - Continue home medications including apixaban, atorvastatin, Cardizem CD.    Adrenal lesion on imaging  -Incidental finding of left adrenal lesion at 25 mm was noted.   -Follow-up as an outpatient.      Further orders as indicated per clinical course.     DVT Prophylaxis: Apixaban  Code Status: Full  Diet: Cardiac  Discharge Plan: Patient is willing to go home with her daughter on discharge.  Patient will likely need 1 to 2 days in the hospital.  Will discuss home health with the patient.    Tom Keyes MD  05/24/23  08:43 EDT    Dictated utilizing Dragon dictation.

## 2023-05-24 NOTE — CASE MANAGEMENT/SOCIAL WORK
Continued Stay Note  FRANCIS Jean     Patient Name: Gabi Dudley  MRN: [id number]  Today's Date: 5/24/2023    Admit Date: 5/22/2023    Plan: Home with familyt   Discharge Plan     Row Name 05/24/23 1231       Plan    Plan Comments Spoke to pt regarding discharge plans .She is declining   rehab and home health . She is interested in assistance with personal care gave her the pathways book as a resource               Discharge Codes    No documentation.               Expected Discharge Date and Time     Expected Discharge Date Expected Discharge Time    May 26, 2023             Pamela Dubon RN

## 2023-05-24 NOTE — THERAPY EVALUATION
Patient Name: Gabi Dudley  : 1945    MRN: [id number]                              Today's Date: 2023       Admit Date: 2023    Visit Dx:     ICD-10-CM ICD-9-CM   1. Acute respiratory failure with hypoxia and hypercapnia  J96.01 518.81    J96.02    2. HAP (hospital-acquired pneumonia)  J18.9 486    Y95    3. Respiratory acidosis  E87.29 276.2   4. Sepsis, due to unspecified organism, unspecified whether acute organ dysfunction present  A41.9 038.9     995.91     Patient Active Problem List   Diagnosis   • Adrenal adenoma   • Anxiety   • Chronic fatigue   • Fibromyalgia   • Hyperlipidemia   • Essential hypertension   • Insomnia   • Osteoarthritis of knee   • Osteoporosis   • Pulmonary emphysema (HCC)   • Simple renal cyst   • Tension headache   • Vitamin D deficiency   • Diverticulosis   • Inversion of nipple   • Paroxysmal atrial fibrillation (HCC)   • Coronary artery disease involving native coronary artery of native heart without angina pectoris   • Autonomic dysfunction   • Gastroesophageal reflux disease without esophagitis   • Urge incontinence   • Erythrasma   • Compression fracture of T5 vertebra   • Lung nodule   • COPD exacerbation   • Overweight (BMI 25.0-29.9)   • Acute on chronic respiratory failure with hypoxia   • Acute on chronic respiratory failure with hypoxia and hypercapnia   • Iron deficiency anemia   • Impaired mobility and ADLs   • Acute respiratory failure with hypoxia and hypercapnia   • Aspiration pneumonia of right lower lobe     Past Medical History:   Diagnosis Date   • Adrenal adenoma    • Anemia    • Arrhythmia    • Asthma    • Atrial fibrillation    • Back pain    • Benign colonic polyp    • Benign tumor of adrenal gland    • Cataract     bilateral   • Cholelithiasis    • Chronic bronchitis    • Chronic bronchitis with COPD (chronic obstructive pulmonary disease)    • COPD (chronic obstructive pulmonary disease)    • Coronary artery disease    • Depression    •  Elevated cholesterol    • Environmental and seasonal allergies    • Fibromyalgia    • Gastritis    • Generalized anxiety disorder    • GERD (gastroesophageal reflux disease)    • H/O mammogram    • Hemorrhoids    • History of blood transfusion 1985   • History of blood transfusion 1985   • History of echocardiogram    • History of endometriosis    • History of nuclear stress test    • Hypertension    • IBS (irritable bowel syndrome)    • Impaired functional mobility, balance, gait, and endurance    • Impaired mobility    • Inverted nipple    • Kidney disease    • Kidney stone    • Liver cyst    • Nodular radiologic density    • Nodule of left lung    • On home oxygen therapy     2 liters NC QHS   • Osteoarthritis    • Osteoporosis    • PONV (postoperative nausea and vomiting)    • Renal cyst    • Sinus problem     2014   • Sinusitis    • Skin cancer     basal cell carcinoma   • SOB (shortness of breath)    • Tobacco use    • Urinary frequency    • Vitamin D deficiency    • Wears glasses    • Wears partial dentures     upper plate     Past Surgical History:   Procedure Laterality Date   • APPENDECTOMY  1980s   • CARDIAC CATHETERIZATION  2003   • CATARACT EXTRACTION  2013    both eyes   • CHOLECYSTECTOMY WITH INTRAOPERATIVE CHOLANGIOGRAM N/A 10/30/2020    Procedure: CHOLECYSTECTOMY LAPAROSCOPIC INTRAOPERATIVE CHOLANGIOGRAPHY;  Surgeon: Sima Moses MD;  Location: [facility] OR;  Service: General;  Laterality: N/A;   • COLONOSCOPY  03/11/2013   • COLONOSCOPY  06/21/2016   • COLONOSCOPY N/A 7/24/2019    Procedure: COLONOSCOPY W/ COLD FORCEP POLYPECTOMIES; HOT SNARE POLYPECTOMIES; COLD SNARE POLYPECTOMY;  Surgeon: Goyo Nunez MD;  Location: [facility] ENDOSCOPY;  Service: Gastroenterology   • COLONOSCOPY W/ BIOPSIES AND POLYPECTOMY     • EXPLORATORY LAPAROTOMY N/A 11/23/2020    Procedure: colectomy, right, closure of enterotomy x 2, reduction of internal volvulus;  Surgeon: Sima Moses MD;  Location: [facility] OR;   Service: General;  Laterality: N/A;   • HYSTERECTOMY  1980s    partial   • LYSIS OF ABDOMINAL ADHESIONS     • TONSILLECTOMY  1987   • UPPER GASTROINTESTINAL ENDOSCOPY  01/13/2015   • VAGINAL DELIVERY      x2      General Information     Row Name 05/24/23 1112          Physical Therapy Time and Intention    Document Type evaluation  -     Mode of Treatment physical therapy  -     Row Name 05/24/23 1112          General Information    Patient Profile Reviewed yes  -JR     Prior Level of Function independent:;all household mobility  -JR     Existing Precautions/Restrictions fall;oxygen therapy device and L/min  -     Barriers to Rehab previous functional deficit  -JR     Row Name 05/24/23 1112          Living Environment    People in Home child(ashely), adult  -JR     Row Name 05/24/23 1112          Home Main Entrance    Number of Stairs, Main Entrance none  -JR     Row Name 05/24/23 1112          Cognition    Orientation Status (Cognition) oriented x 4  -JR     Row Name 05/24/23 1112          Safety Issues, Functional Mobility    Safety Issues Affecting Function (Mobility) safety precautions follow-through/compliance;insight into deficits/self-awareness  -     Impairments Affecting Function (Mobility) endurance/activity tolerance;shortness of breath;strength  -           User Key  (r) = Recorded By, (t) = Taken By, (c) = Cosigned By    Initials Name Provider Type     Cait Tubbs, PT Physical Therapist               Mobility     Row Name 05/24/23 1112          Bed Mobility    Bed Mobility supine-sit  -     Supine-Sit Gaston (Bed Mobility) modified independence  -     Assistive Device (Bed Mobility) bed rails;head of bed elevated  -     Row Name 05/24/23 1112          Sit-Stand Transfer    Sit-Stand Gaston (Transfers) standby assist  -     Assistive Device (Sit-Stand Transfers) other (see comments)  gait belt and arm of BSC and chair  -     Row Name 05/24/23 1112          Gait/Stairs  (Locomotion)    [place] Level (Gait) contact guard;standby assist  -JR     Assistive Device (Gait) other (see comments)  gait belt and arm rest of BSC and chair  -JR     Distance in Feet (Gait) 3  -JR     Deviations/Abnormal Patterns (Gait) base of support, narrow;festinating/shuffling;stride length decreased  -JR     Bilateral Gait Deviations forward flexed posture;heel strike decreased  -JR           User Key  (r) = Recorded By, (t) = Taken By, (c) = Cosigned By    Initials Name Provider Type    Cait Ruvalcaba, PT Physical Therapist               Obj/Interventions     Row Name 05/24/23 1112          Range of Motion Comprehensive    General Range of Motion bilateral lower extremity ROM WFL  -JR     Row Name 05/24/23 1112          Strength Comprehensive (MMT)    Comment, General Manual Muscle Testing (MMT) Assessment BLE grossly 4-/5  -JR     Row Name 05/24/23 1112          Balance    Balance Assessment sitting static balance;sitting dynamic balance;sit to stand dynamic balance;standing static balance;standing dynamic balance  -JR     Static Sitting Balance modified independence  -JR     Dynamic Sitting Balance supervision  -JR     Position, Sitting Balance unsupported;sitting edge of bed  -JR     Sit to Stand Dynamic Balance contact guard  -JR     Static Standing Balance contact guard  -JR     Dynamic Standing Balance contact guard  -JR           User Key  (r) = Recorded By, (t) = Taken By, (c) = Cosigned By    Initials Name Provider Type    Cait Ruvalcaba, PT Physical Therapist               Goals/Plan     Row Name 05/24/23 1112          Transfer Goal 1 (PT)    Activity/Assistive Device (Transfer Goal 1, PT) sit-to-stand/stand-to-sit;bed-to-chair/chair-to-bed  -JR     [place] Level/Cues Needed (Transfer Goal 1, PT) modified independence  -JR     Time Frame (Transfer Goal 1, PT) short term goal (STG)  -JR     Progress/Outcome (Transfer Goal 1, PT) goal ongoing  -JR     Row Name 05/24/23 1112           Gait Training Goal 1 (PT)    Activity/Assistive Device (Gait Training Goal 1, PT) gait (walking locomotion);assistive device use  -JR     Mitchell Level (Gait Training Goal 1, PT) supervision required  -JR     Time Frame (Gait Training Goal 1, PT) short term goal (STG)  -JR     Progress/Outcome (Gait Training Goal 1, PT) goal ongoing  -JR     Row Name 05/24/23 1112          Patient Education Goal (PT)    Activity (Patient Education Goal, PT) Perform BLE exercises x 15  -JR     Mitchell/Cues/Accuracy (Memory Goal 2, PT) demonstrates adequately  -JR     Time Frame (Patient Education Goal, PT) short term goal (STG)  -JR     Progress/Outcome (Patient Education Goal, PT) goal ongoing  -JR     Row Name 05/24/23 1112          Therapy Assessment/Plan (PT)    Planned Therapy Interventions (PT) strengthening;balance training;transfer training;gait training;home exercise program;patient/family education  -           User Key  (r) = Recorded By, (t) = Taken By, (c) = Cosigned By    Initials Name Provider Type    JR Cait Tubbs, PT Physical Therapist               Clinical Impression     Row Name 05/24/23 1112          Pain    Pretreatment Pain Rating 0/10 - no pain  -JR     Posttreatment Pain Rating 0/10 - no pain  -JR     Additional Documentation Pain Scale: Numbers Pre/Post-Treatment (Group)  -     Row Name 05/24/23 1112          Plan of Care Review    Plan of Care Reviewed With patient  -JR     Progress no change  -     Outcome Evaluation Patient participated well in PT evaluation and demonstrates decreased strength, activity tolerance and overall mobility due to difficulty with SOA.  She requires SBA for transfers and CGA for 3 feet of gait with use of arm rests for support.  She is expected to benefit from additional PT services while hospitalized and upon discharge to home with home health care services.  -     Row Name 05/24/23 1112          Therapy Assessment/Plan (PT)    Patient/Family Therapy Goals  Statement (PT) Patient wants to go home  -     Rehab Potential (PT) good, to achieve stated therapy goals  -     Criteria for Skilled Interventions Met (PT) yes;meets criteria;skilled treatment is necessary  -     Therapy Frequency (PT) 5 times/wk  -     Row Name 05/24/23 1112          Positioning and Restraints    Pre-Treatment Position in bed  -JR     Post Treatment Position chair  -JR     In Chair reclined;call light within reach;encouraged to call for assist;exit alarm on;notified nsg  -           User Key  (r) = Recorded By, (t) = Taken By, (c) = Cosigned By    Initials Name Provider Type    Cait Ruvalcaba, PT Physical Therapist               Outcome Measures     Row Name 05/24/23 1112          How much help from another person do you currently need...    Turning from your back to your side while in flat bed without using bedrails? 4  -JR     Moving from lying on back to sitting on the side of a flat bed without bedrails? 4  -JR     Moving to and from a bed to a chair (including a wheelchair)? 3  -JR     Standing up from a chair using your arms (e.g., wheelchair, bedside chair)? 3  -JR     Climbing 3-5 steps with a railing? 2  -JR     To walk in hospital room? 3  -JR     AM-PAC 6 Clicks Score (PT) 19  -JR     Highest level of mobility 6 --> Walked 10 steps or more  -     Row Name 05/24/23 1254 05/24/23 1112       Functional Assessment    Outcome Measure Options AM-PAC 6 Clicks Daily Activity (OT)  -SD AM-PAC 6 Clicks Basic Mobility (PT)  -          User Key  (r) = Recorded By, (t) = Taken By, (c) = Cosigned By    Initials Name Provider Type    Cait Ruvalcaba, PT Physical Therapist    Aimee Bolanos OT Occupational Therapist                             Physical Therapy Education     Title: PT OT SLP Therapies (In Progress)     Topic: Physical Therapy (In Progress)     Point: Mobility training (Done)     Learning Progress Summary           Patient Acceptance, E,TB, VU by  at 5/24/2023 [id number]     Comment: Role of PT and POC                   Point: Home exercise program (Not Started)     Learner Progress:  Not documented in this visit.          Point: Body mechanics (Not Started)     Learner Progress:  Not documented in this visit.          Point: Precautions (Not Started)     Learner Progress:  Not documented in this visit.                      User Key     Initials Effective Dates Name Provider Type Discipline     03/23/22 -  Cait Tubbs PT Physical Therapist PT              PT Recommendation and Plan  Planned Therapy Interventions (PT): strengthening, balance training, transfer training, gait training, home exercise program, patient/family education  Plan of Care Reviewed With: patient  Progress: no change  Outcome Evaluation: Patient participated well in PT evaluation and demonstrates decreased strength, activity tolerance and overall mobility due to difficulty with SOA.  She requires SBA for transfers and CGA for 3 feet of gait with use of arm rests for support.  She is expected to benefit from additional PT services while hospitalized and upon discharge to home with home health care services.     Time Calculation:    PT Charges     Row Name 05/24/23 1815             Time Calculation    Start Time 1112  -JR      PT Received On 05/24/23  -      PT Goal Re-Cert Due Date 06/03/23  -         Untimed Charges    PT Eval/Re-eval Minutes 42  -JR         Total Minutes    Untimed Charges Total Minutes 42  -JR       Total Minutes 42  -JR            User Key  (r) = Recorded By, (t) = Taken By, (c) = Cosigned By    Initials Name Provider Type    Cait Ruvalcaba, PT Physical Therapist              Therapy Charges for Today     Code Description Service Date Service Provider Modifiers Qty    24734421224 HC PT EVAL LOW COMPLEXITY 3 5/24/2023 Cait Tubbs, PT GP 1          PT G-Codes  Outcome Measure Options: AM-PAC 6 Clicks Daily Activity (OT)  AM-PAC 6 Clicks Score (PT): 19  AM-PAC 6 Clicks Score (OT):  19  PT Discharge Summary  Anticipated Discharge Disposition (PT): home with home health    Cait Tubbs, PT  5/24/2023

## 2023-05-24 NOTE — PLAN OF CARE
Problem: Adult Inpatient Plan of Care  Goal: Plan of Care Review  Outcome: Ongoing, Progressing     Problem: Fall Injury Risk  Goal: Absence of Fall and Fall-Related Injury  Outcome: Ongoing, Progressing  Intervention: Identify and Manage Contributors  Recent Flowsheet Documentation  Taken 5/24/2023 0200 by Ashleigh Pedro RN  Medication Review/Management: medications reviewed  Taken 5/24/2023 0000 by Ashleigh Pedro RN  Medication Review/Management: medications reviewed  Taken 5/23/2023 2200 by Ashleigh Pedro RN  Medication Review/Management: medications reviewed  Taken 5/23/2023 2000 by Ashleigh Pedro RN  Medication Review/Management: medications reviewed  Intervention: Promote Injury-Free Environment  Recent Flowsheet Documentation  Taken 5/24/2023 0200 by Ashleigh Pedro RN  Safety Promotion/Fall Prevention:   activity supervised   assistive device/personal items within reach   clutter free environment maintained   safety round/check completed  Taken 5/24/2023 0000 by Ashleigh Pedro RN  Safety Promotion/Fall Prevention:   activity supervised   assistive device/personal items within reach   clutter free environment maintained   safety round/check completed  Taken 5/23/2023 2200 by Ashleigh Pedro RN  Safety Promotion/Fall Prevention:   activity supervised   clutter free environment maintained   assistive device/personal items within reach   safety round/check completed  Taken 5/23/2023 2000 by Ashleigh Pedro RN  Safety Promotion/Fall Prevention:   activity supervised   assistive device/personal items within reach   clutter free environment maintained   safety round/check completed     Problem: Pain Acute  Goal: Acceptable Pain Control and Functional Ability  Outcome: Ongoing, Progressing  Intervention: Prevent or Manage Pain  Recent Flowsheet Documentation  Taken 5/24/2023 0200 by Ashleigh Pedro RN  Medication Review/Management: medications reviewed  Taken 5/24/2023 0000 by  Ashleigh Pedro RN  Medication Review/Management: medications reviewed  Taken 5/23/2023 2200 by Ashleigh Pedro RN  Medication Review/Management: medications reviewed  Taken 5/23/2023 2000 by Ashleigh Pedro RN  Medication Review/Management: medications reviewed     Problem: Gas Exchange Impaired  Goal: Optimal Gas Exchange  Outcome: Ongoing, Progressing  Intervention: Optimize Oxygenation and Ventilation  Recent Flowsheet Documentation  Taken 5/24/2023 0200 by Ashleigh Pedro RN  Head of Bed (HOB) Positioning: HOB elevated  Taken 5/24/2023 0000 by Ashleigh Pedro RN  Head of Bed (HOB) Positioning: HOB elevated  Taken 5/23/2023 2200 by Ashleigh Pedro RN  Head of Bed (HOB) Positioning: HOB elevated  Taken 5/23/2023 2000 by Ashleigh Pedro RN  Head of Bed (HOB) Positioning: HOB elevated   Goal Outcome Evaluation:      Plan of care reviewed with patient. Patient has rested between care tonight. Patient gets agitated and frustrated anytime she is woken up. Patient verbalized she wants to be left alone and to sleep. Patient has worn bipap most of the night this shift. Patient remains on 4L when she is not wearing the bipap. This is patients baseline oxygen requirement. No other significant changes this shift.

## 2023-05-24 NOTE — THERAPY EVALUATION
Patient Name: Gabi Dudley  : 1945    MRN: [id number]                              Today's Date: 2023       Admit Date: 2023    Visit Dx:     ICD-10-CM ICD-9-CM   1. Acute respiratory failure with hypoxia and hypercapnia  J96.01 518.81    J96.02    2. HAP (hospital-acquired pneumonia)  J18.9 486    Y95    3. Respiratory acidosis  E87.29 276.2   4. Sepsis, due to unspecified organism, unspecified whether acute organ dysfunction present  A41.9 038.9     995.91     Patient Active Problem List   Diagnosis   • Adrenal adenoma   • Anxiety   • Chronic fatigue   • Fibromyalgia   • Hyperlipidemia   • Essential hypertension   • Insomnia   • Osteoarthritis of knee   • Osteoporosis   • Pulmonary emphysema (HCC)   • Simple renal cyst   • Tension headache   • Vitamin D deficiency   • Diverticulosis   • Inversion of nipple   • Paroxysmal atrial fibrillation (HCC)   • Coronary artery disease involving native coronary artery of native heart without angina pectoris   • Autonomic dysfunction   • Gastroesophageal reflux disease without esophagitis   • Urge incontinence   • Erythrasma   • Compression fracture of T5 vertebra   • Lung nodule   • COPD exacerbation   • Overweight (BMI 25.0-29.9)   • Acute on chronic respiratory failure with hypoxia   • Acute on chronic respiratory failure with hypoxia and hypercapnia   • Iron deficiency anemia   • Impaired mobility and ADLs   • Acute respiratory failure with hypoxia and hypercapnia   • Aspiration pneumonia of right lower lobe     Past Medical History:   Diagnosis Date   • Adrenal adenoma    • Anemia    • Arrhythmia    • Asthma    • Atrial fibrillation    • Back pain    • Benign colonic polyp    • Benign tumor of adrenal gland    • Cataract     bilateral   • Cholelithiasis    • Chronic bronchitis    • Chronic bronchitis with COPD (chronic obstructive pulmonary disease)    • COPD (chronic obstructive pulmonary disease)    • Coronary artery disease    • Depression    •  Elevated cholesterol    • Environmental and seasonal allergies    • Fibromyalgia    • Gastritis    • Generalized anxiety disorder    • GERD (gastroesophageal reflux disease)    • H/O mammogram    • Hemorrhoids    • History of blood transfusion 1985   • History of blood transfusion 1985   • History of echocardiogram    • History of endometriosis    • History of nuclear stress test    • Hypertension    • IBS (irritable bowel syndrome)    • Impaired functional mobility, balance, gait, and endurance    • Impaired mobility    • Inverted nipple    • Kidney disease    • Kidney stone    • Liver cyst    • Nodular radiologic density    • Nodule of left lung    • On home oxygen therapy     2 liters NC QHS   • Osteoarthritis    • Osteoporosis    • PONV (postoperative nausea and vomiting)    • Renal cyst    • Sinus problem     2014   • Sinusitis    • Skin cancer     basal cell carcinoma   • SOB (shortness of breath)    • Tobacco use    • Urinary frequency    • Vitamin D deficiency    • Wears glasses    • Wears partial dentures     upper plate     Past Surgical History:   Procedure Laterality Date   • APPENDECTOMY  1980s   • CARDIAC CATHETERIZATION  2003   • CATARACT EXTRACTION  2013    both eyes   • CHOLECYSTECTOMY WITH INTRAOPERATIVE CHOLANGIOGRAM N/A 10/30/2020    Procedure: CHOLECYSTECTOMY LAPAROSCOPIC INTRAOPERATIVE CHOLANGIOGRAPHY;  Surgeon: Sima Moses MD;  Location: [facility] OR;  Service: General;  Laterality: N/A;   • COLONOSCOPY  03/11/2013   • COLONOSCOPY  06/21/2016   • COLONOSCOPY N/A 7/24/2019    Procedure: COLONOSCOPY W/ COLD FORCEP POLYPECTOMIES; HOT SNARE POLYPECTOMIES; COLD SNARE POLYPECTOMY;  Surgeon: Goyo Nuenz MD;  Location: [facility] ENDOSCOPY;  Service: Gastroenterology   • COLONOSCOPY W/ BIOPSIES AND POLYPECTOMY     • EXPLORATORY LAPAROTOMY N/A 11/23/2020    Procedure: colectomy, right, closure of enterotomy x 2, reduction of internal volvulus;  Surgeon: Sima Moses MD;  Location: [facility] OR;   Service: General;  Laterality: N/A;   • HYSTERECTOMY  1980s    partial   • LYSIS OF ABDOMINAL ADHESIONS     • TONSILLECTOMY  1987   • UPPER GASTROINTESTINAL ENDOSCOPY  01/13/2015   • VAGINAL DELIVERY      x2      General Information     Row Name 05/24/23 1247          OT Time and Intention    Document Type evaluation  -SD     Mode of Treatment occupational therapy  -SD     Row Name 05/24/23 1247          General Information    Patient Profile Reviewed yes  -SD     Prior Level of Function independent:;all household mobility;ADL's  Lives in den of her daughter's home, has a rollator, BSC, SC, O2. Daughter works during the day and patient is intersted in caregivers a couple hours a day to assist with self care.  -SD     Existing Precautions/Restrictions fall;oxygen therapy device and L/min  -SD     Barriers to Rehab previous functional deficit  -SD     Row Name 05/24/23 1247          Living Environment    People in Home child(ashely), adult  -SD     Row Name 05/24/23 1247          Home Main Entrance    Number of Stairs, Main Entrance none  -SD     Row Name 05/24/23 1247          Cognition    Orientation Status (Cognition) oriented x 4  -SD     Row Name 05/24/23 1247          Safety Issues, Functional Mobility    Safety Issues Affecting Function (Mobility) safety precautions follow-through/compliance  -SD     Impairments Affecting Function (Mobility) endurance/activity tolerance;shortness of breath;strength  -SD           User Key  (r) = Recorded By, (t) = Taken By, (c) = Cosigned By    Initials Name Provider Type    Aimee Bolanos OT Occupational Therapist                 Mobility/ADL's     Row Name 05/24/23 1248          Bed Mobility    Bed Mobility supine-sit  -SD     Supine-Sit [place] (Bed Mobility) modified independence  -SD     Assistive Device (Bed Mobility) bed rails;head of bed elevated  -SD     Row Name 05/24/23 1248          Transfers    Transfers sit-stand transfer;toilet transfer  -SD     Row Name  05/24/23 1248          Sit-Stand Transfer    Sit-Stand [place] (Transfers) standby assist  -SD     Row Name 05/24/23 1248          Toilet Transfer    Type (Toilet Transfer) sit-stand;stand-sit  -SD     [place] Level (Toilet Transfer) standby assist  -SD     Assistive Device (Toilet Transfer) commode, bedside without drop arms  -SD     Row Name 05/24/23 1248          Activities of Daily Living    BADL Assessment/Intervention bathing;upper body dressing;lower body dressing;grooming;feeding;toileting  -SD     [facility] Name 05/24/23 1248          Bathing Assessment/Intervention    [place] Level (Bathing) minimum assist (75% patient effort)  -SD     [facility] Name 05/24/23 1248          Upper Body Dressing Assessment/Training    [place] Level (Upper Body Dressing) set up  -SD     [facility] Name 05/24/23 1248          Lower Body Dressing Assessment/Training    [place] Level (Lower Body Dressing) minimum assist (75% patient effort)  -SD     [facility] Name 05/24/23 1248          Grooming Assessment/Training    [place] Level (Grooming) standby assist  -SD     [facility] Name 05/24/23 1248          Self-Feeding Assessment/Training    [place] Level (Feeding) supervision  -SD     [facility] Name 05/24/23 1248          Toileting Assessment/Training    [place] Level (Toileting) contact guard assist  -SD     Assistive Devices (Toileting) commode, bedside without drop arms  -SD           User Key  (r) = Recorded By, (t) = Taken By, (c) = Cosigned By    Initials Name Provider Type    SD Aimee Allan OT Occupational Therapist               Obj/Interventions     Row Name 05/24/23 1249          Range of Motion Comprehensive    General Range of Motion bilateral upper extremity ROM WFL  -SD     Row Name 05/24/23 1249          Strength Comprehensive (MMT)    General Manual Muscle Testing (MMT) Assessment upper extremity strength deficits identified  -SD     Comment, General Manual Muscle Testing (MMT) Assessment 4-/5  -SD            User Key  (r) = Recorded By, (t) = Taken By, (c) = Cosigned By    Initials Name Provider Type    Aimee Bolanos OT Occupational Therapist               Goals/Plan     Row Name 05/24/23 1253          Transfer Goal 1 (OT)    Activity/Assistive Device (Transfer Goal 1, OT) sit-to-stand/stand-to-sit;walker, rolling  -SD     [place] Level/Cues Needed (Transfer Goal 1, OT) modified independence  -SD     Time Frame (Transfer Goal 1, OT) long term goal (LTG)  -SD     Progress/Outcome (Transfer Goal 1, OT) goal ongoing  -SD     Row Name 05/24/23 1253          Dressing Goal 1 (OT)    Activity/Device (Dressing Goal 1, OT) lower body dressing  -SD     [place]/Cues Needed (Dressing Goal 1, OT) standby assist  -SD     Time Frame (Dressing Goal 1, OT) 2 weeks  -SD     Progress/Outcome (Dressing Goal 1, OT) goal ongoing  -SD     Row Name 05/24/23 1253          Toileting Goal 1 (OT)    Activity/Device (Toileting Goal 1, OT) toileting skills, all;commode  -SD     [place] Level/Cues Needed (Toileting Goal 1, OT) standby assist  -SD     Time Frame (Toileting Goal 1, OT) 2 weeks  -SD     Progress/Outcome (Toileting Goal 1, OT) goal ongoing  -SD     Row Name 05/24/23 1253          Strength Goal 1 (OT)    Strength Goal 1 (OT) Patient to perform UB ther ex as tolerated  -SD     Time Frame (Strength Goal 1, OT) long term goal (LTG)  -SD     Progress/Outcome (Strength Goal 1, OT) goal ongoing  -SD     Row Name 05/24/23 1253          Therapy Assessment/Plan (OT)    Planned Therapy Interventions (OT) activity tolerance training;adaptive equipment training;BADL retraining;patient/caregiver education/training;ROM/therapeutic exercise;strengthening exercise;transfer/mobility retraining  -SD           User Key  (r) = Recorded By, (t) = Taken By, (c) = Cosigned By    Initials Name Provider Type    Aimee Bolanos OT Occupational Therapist               Clinical Impression     Row Name 05/24/23 1249          Pain  Assessment    Pretreatment Pain Rating 0/10 - no pain  -SD     Posttreatment Pain Rating 0/10 - no pain  -SD     Row Name 05/24/23 1249          Plan of Care Review    Plan of Care Reviewed With patient  -SD     Progress no change  -SD     Outcome Evaluation OT eval completed. Patient supine in bed, nursing present at bedside. Patient Ox4, no complaints of pain, on 4L. Patient moved to EOB with modified ind. Patient transferred from EOB to BS with SBA without AD, performed clothing management, perineal hygiene with SBA-CGA. Patient transferred from BSC to chair, declined additional mobility. Patient reports she lives in the den of her daughter's home and has all necessary DME, but wishes to have caregiver a couple hours a day to assist with self care while her daughter is at work. Patient is expected to benefit from continued OT services prior to DC and would benefit from  services.  -SD     Row Name 05/24/23 1249          Therapy Assessment/Plan (OT)    Patient/Family Therapy Goal Statement (OT) home with assist  -SD     Rehab Potential (OT) good, to achieve stated therapy goals  -SD     Criteria for Skilled Therapeutic Interventions Met (OT) skilled treatment is necessary  -SD     Therapy Frequency (OT) 3 times/wk  5 times if indicated  -SD     Row Name 05/24/23 1248          Therapy Plan Review/Discharge Plan (OT)    Anticipated Discharge Disposition (OT) home with home health;home with assist  -SD     Row Name 05/24/23 124          Vital Signs    O2 Delivery Pre Treatment supplemental O2  -SD     O2 Delivery Intra Treatment supplemental O2  -SD     Post SpO2 (%) 96  -SD     O2 Delivery Post Treatment supplemental O2  -SD     Row Name 05/24/23 1249          Positioning and Restraints    Pre-Treatment Position in bed  -SD     Post Treatment Position chair  -SD     In Chair reclined;call light within reach;encouraged to call for assist;exit alarm on;notified nsg  -SD           User Key  (r) = Recorded By, (t) =  Taken By, (c) = Cosigned By    Initials Name Provider Type    Aimee Bolanos OT Occupational Therapist               Outcome Measures     Row Name 05/24/23 1254          How much help from another is currently needed...    Putting on and taking off regular lower body clothing? 3  -SD     Bathing (including washing, rinsing, and drying) 3  -SD     Toileting (which includes using toilet bed pan or urinal) 3  -SD     Putting on and taking off regular upper body clothing 3  -SD     Taking care of personal grooming (such as brushing teeth) 3  -SD     Eating meals 4  -SD     AM-PAC 6 Clicks Score (OT) 19  -SD     Row Name 05/24/23 1254          Functional Assessment    Outcome Measure Options AM-PAC 6 Clicks Daily Activity (OT)  -SD           User Key  (r) = Recorded By, (t) = Taken By, (c) = Cosigned By    Initials Name Provider Type    Aimee Bolanos OT Occupational Therapist                Occupational Therapy Education     Title: PT OT SLP Therapies (In Progress)     Topic: Occupational Therapy (In Progress)     Point: ADL training (Done)     Description:   Instruct learner(s) on proper safety adaptation and remediation techniques during self care or transfers.   Instruct in proper use of assistive devices.              Learning Progress Summary           Patient Acceptance, E,TB, VU by SD at 5/24/2023 1255    Comment: OT POC                   Point: Home exercise program (Not Started)     Description:   Instruct learner(s) on appropriate technique for monitoring, assisting and/or progressing therapeutic exercises/activities.              Learner Progress:  Not documented in this visit.          Point: Precautions (Not Started)     Description:   Instruct learner(s) on prescribed precautions during self-care and functional transfers.              Learner Progress:  Not documented in this visit.          Point: Body mechanics (Not Started)     Description:   Instruct learner(s) on proper positioning and  spine alignment during self-care, functional mobility activities and/or exercises.              Learner Progress:  Not documented in this visit.                      User Key     Initials Effective Dates Name Provider Type Discipline    SD 06/16/21 -  Aimee Allan OT Occupational Therapist OT              OT Recommendation and Plan  Planned Therapy Interventions (OT): activity tolerance training, adaptive equipment training, BADL retraining, patient/caregiver education/training, ROM/therapeutic exercise, strengthening exercise, transfer/mobility retraining  Therapy Frequency (OT): 3 times/wk (5 times if indicated)  Plan of Care Review  Plan of Care Reviewed With: patient  Progress: no change  Outcome Evaluation: OT eval completed. Patient supine in bed, nursing present at bedside. Patient Ox4, no complaints of pain, on 4L. Patient moved to EOB with modified ind. Patient transferred from EOB to BSC with SBA without AD, performed clothing management, perineal hygiene with SBA-CGA. Patient transferred from BSC to chair, declined additional mobility. Patient reports she lives in the den of her daughter's home and has all necessary DME, but wishes to have caregiver a couple hours a day to assist with self care while her daughter is at work. Patient is expected to benefit from continued OT services prior to DC and would benefit from  services.     Time Calculation:    Time Calculation- OT     Row Name 05/24/23 1255             Time Calculation- OT    OT Start Time 1113  -SD      OT Received On 05/24/23  -SD      OT Goal Re-Cert Due Date 06/05/23  -SD         Untimed Charges    OT Eval/Re-eval Minutes 45  -SD         Total Minutes    Untimed Charges Total Minutes 45  -SD       Total Minutes 45  -SD            User Key  (r) = Recorded By, (t) = Taken By, (c) = Cosigned By    Initials Name Provider Type    SD Aimee Allan OT Occupational Therapist              Therapy Charges for Today     Code Description  Service Date Service Provider Modifiers Qty    [id number]  OT EVAL LOW COMPLEXITY 3 5/24/2023 Aimee Allan OT GO 1               Aimee Allan OT  5/24/2023

## 2023-05-24 NOTE — THERAPY EVALUATION
Acute Care - Speech Language Pathology   Swallow Initial Evaluation  Ted     Patient Name: Gabi Dudley  : 1945  MRN: [id number]  Today's Date: 2023               Admit Date: 2023    Visit Dx:     ICD-10-CM ICD-9-CM   1. Acute respiratory failure with hypoxia and hypercapnia  J96.01 518.81    J96.02    2. HAP (hospital-acquired pneumonia)  J18.9 486    Y95    3. Respiratory acidosis  E87.29 276.2   4. Sepsis, due to unspecified organism, unspecified whether acute organ dysfunction present  A41.9 038.9     995.91     Patient Active Problem List   Diagnosis   • Adrenal adenoma   • Anxiety   • Chronic fatigue   • Fibromyalgia   • Hyperlipidemia   • Essential hypertension   • Insomnia   • Osteoarthritis of knee   • Osteoporosis   • Pulmonary emphysema (HCC)   • Simple renal cyst   • Tension headache   • Vitamin D deficiency   • Diverticulosis   • Inversion of nipple   • Paroxysmal atrial fibrillation (HCC)   • Coronary artery disease involving native coronary artery of native heart without angina pectoris   • Autonomic dysfunction   • Gastroesophageal reflux disease without esophagitis   • Urge incontinence   • Erythrasma   • Compression fracture of T5 vertebra   • Lung nodule   • COPD exacerbation   • Overweight (BMI 25.0-29.9)   • Acute on chronic respiratory failure with hypoxia   • Acute on chronic respiratory failure with hypoxia and hypercapnia   • Iron deficiency anemia   • Impaired mobility and ADLs   • Acute respiratory failure with hypoxia and hypercapnia   • Aspiration pneumonia of right lower lobe     Past Medical History:   Diagnosis Date   • Adrenal adenoma    • Anemia    • Arrhythmia    • Asthma    • Atrial fibrillation    • Back pain    • Benign colonic polyp    • Benign tumor of adrenal gland    • Cataract     bilateral   • Cholelithiasis    • Chronic bronchitis    • Chronic bronchitis with COPD (chronic obstructive pulmonary disease)    • COPD (chronic obstructive pulmonary  disease) 2005   • Coronary artery disease    • Depression    • Elevated cholesterol    • Environmental and seasonal allergies    • Fibromyalgia    • Gastritis    • Generalized anxiety disorder    • GERD (gastroesophageal reflux disease)    • H/O mammogram    • Hemorrhoids    • History of blood transfusion 1985   • History of blood transfusion 1985   • History of echocardiogram    • History of endometriosis    • History of nuclear stress test    • Hypertension    • IBS (irritable bowel syndrome)    • Impaired functional mobility, balance, gait, and endurance    • Impaired mobility    • Inverted nipple    • Kidney disease    • Kidney stone    • Liver cyst    • Nodular radiologic density    • Nodule of left lung    • On home oxygen therapy     2 liters NC QHS   • Osteoarthritis    • Osteoporosis    • PONV (postoperative nausea and vomiting)    • Renal cyst    • Sinus problem     2014   • Sinusitis    • Skin cancer     basal cell carcinoma   • SOB (shortness of breath)    • Tobacco use    • Urinary frequency    • Vitamin D deficiency    • Wears glasses    • Wears partial dentures     upper plate     Past Surgical History:   Procedure Laterality Date   • APPENDECTOMY  1980s   • CARDIAC CATHETERIZATION  2003   • CATARACT EXTRACTION  2013    both eyes   • CHOLECYSTECTOMY WITH INTRAOPERATIVE CHOLANGIOGRAM N/A 10/30/2020    Procedure: CHOLECYSTECTOMY LAPAROSCOPIC INTRAOPERATIVE CHOLANGIOGRAPHY;  Surgeon: Sima Moses MD;  Location: [facility] OR;  Service: General;  Laterality: N/A;   • COLONOSCOPY  03/11/2013   • COLONOSCOPY  06/21/2016   • COLONOSCOPY N/A 7/24/2019    Procedure: COLONOSCOPY W/ COLD FORCEP POLYPECTOMIES; HOT SNARE POLYPECTOMIES; COLD SNARE POLYPECTOMY;  Surgeon: Goyo Nunez MD;  Location: [facility] ENDOSCOPY;  Service: Gastroenterology   • COLONOSCOPY W/ BIOPSIES AND POLYPECTOMY     • EXPLORATORY LAPAROTOMY N/A 11/23/2020    Procedure: colectomy, right, closure of enterotomy x 2, reduction of internal  volvulus;  Surgeon: Sima Moses MD;  Location: [facility];  Service: General;  Laterality: N/A;   • HYSTERECTOMY  1980s    partial   • LYSIS OF ABDOMINAL ADHESIONS     • TONSILLECTOMY  1987   • UPPER GASTROINTESTINAL ENDOSCOPY  01/13/2015   • VAGINAL DELIVERY      x2       SLP Recommendation and Plan  SLP Swallowing Diagnosis: suspected pharyngeal dysphagia, other (see comments), esophageal dysphagia (dental issues affect oral comfort with regular po solids) (05/24/23 1040)  SLP Diet Recommendation: regular textures, thin liquids (05/24/23 1040)  Recommended Precautions and Strategies: upright posture during/after eating, small bites of food and sips of liquid, chin tuck, reflux precautions, general aspiration precautions, other (see comments) (single sip-swallows, no consecutive swallows) (05/24/23 1040)  SLP Rec. for Method of Medication Administration: meds whole, with thin liquids, with puree, as tolerated (05/24/23 1040)     Monitor for Signs of Aspiration: yes, notify SLP if any concerns, cough, gurgly voice, throat clearing (05/24/23 1040)        Anticipated Discharge Disposition (SLP): home with assist (05/24/23 1040)     Therapy Frequency (Swallow): PRN (05/24/23 1040)  Predicted Duration Therapy Intervention (Days): until discharge (05/24/23 1040)                                        Progress: no change  Outcome Evaluation: Bedside eval of swallow completed with pt. seated upright in bed for po trials.  Pt. denied dysphagia but reported having new dentures in place and dental fragment(s) behind new dentures that bother her at times, but dentist is aware.  Oral mech was WFL.  Pt. was given trials of regular solid, puree, and thin liquid.  Oral phase was WFL with all trials.  Suspect mild pharyngeal phase dysphagia with pt. exhibiting cough 1x after large drink of H20 due to reduced bolus control, but no difficulty over multiple trials of thin liquid.  A compensatory chin tuck was also trialed without  any s/s aspiration with trials. Pt. is at risk of some swallow-breathe dyscoordination at times due to 02 dependance during and prior to admit.  Discussed with pt. with importance for using strategies for safety.  Considering her dental discomfort from the tooth fragment(s) and new dentures, pt. voiced complaints of eating some regular solids and would benefit from soft to chew diet to reduce complexity for ease of bolus prep.  D/W RN following eval who reported no observed difficulty when giving po meds.  Precautionary recommendations as follows:  1. soft to chew diet with thin liq as akhil, 2. meds whole with thin  liq or pudding/applesauce as akhil, 3. single sip-swallows, no consecutive swallows, 4. chin tuck with liquids, 5. aspiration precautions, 6. reflux precautions.      SWALLOW EVALUATION (last 72 hours)     [facility] Adult Swallow Evaluation     Row Name 05/24/23 1040                   Rehab Evaluation    Document Type evaluation  -TM        Subjective Information no complaints  -TM        Patient Observations alert;cooperative  -TM        Patient/Family/Caregiver Comments/Observations no family present  -TM        Patient Effort excellent  -TM           General Information    Patient Profile Reviewed yes  -TM        Pertinent History Of Current Problem pneu, GERD, cardiac, COPD  -TM        Current Method of Nutrition regular textures;thin liquids  -TM        Precautions/Limitations, Vision WFL with corrective lenses  -TM        Precautions/Limitations, Hearing WFL;for purposes of eval  -TM        Prior Level of Function-Communication WFL  -TM        Prior Level of Function-Swallowing no diet consistency restrictions  -TM        Plans/Goals Discussed with patient;other (see comments)  RN  -TM        Barriers to Rehab none identified  -TM        Patient's Goals for Discharge patient did not state  -TM           Pain    Additional Documentation Pain Scale: Numbers Pre/Post-Treatment (Group)  -TM           Pain  Scale: Numbers Pre/Post-Treatment    Pretreatment Pain Rating 0/10 - no pain  -TM        Posttreatment Pain Rating 0/10 - no pain  -TM           Oral Motor Structure and Function    Oral Lesions or Structural Abnormalities and/or variants none identified  -TM        Dentition Assessment other (see comments);upper dentures/partial in place;lower dentures/partial in place  dentures in place; pt. reported having dental fragment(s) behind new dentures that bother her at times but dentist aware  -TM        Secretion Management WNL/WFL  -TM        Mucosal Quality moist, healthy  -TM        Volitional Cough WFL  -TM           Oral Musculature and Cranial Nerve Assessment    Oral Motor General Assessment WFL  -TM           General Eating/Swallowing Observations    Respiratory Support Currently in Use nasal cannula  -TM        O2 Liters 4L  -TM        Eating/Swallowing Skills self-fed;fed by SLP  -TM        Positioning During Eating upright in bed  -TM        Utensils Used spoon;straw  -TM        Consistencies Trialed regular textures;pureed;thin liquids  -TM           Respiratory    Respiratory Status WFL;during swallowing/eating  -TM           Clinical Swallow Eval    Oral Prep Phase WFL  -TM        Oral Transit WFL  -TM        Oral Residue WFL  -TM        Pharyngeal Phase suspected pharyngeal impairment  -TM        Esophageal Phase suspected esophageal impairment  dx of GERD  -TM        Clinical Swallow Evaluation Summary Bedside eval of swallow completed with pt. seated upright in bed for po trials.  Pt. denied dysphagia but reported having new dentures in place and dental fragment(s) behind new dentures that bother her at times, but dentist is aware.  Oral mech was WFL.  Pt. was given trials of regular solid, puree, and thin liquid.  Oral phase was WFL with all trials.  Suspect mild pharyngeal phase dysphagia with pt. exhibiting cough 1x after large drink of H20 due to reduced bolus control, but no difficulty over  multiple trials of thin liquid.  A compensatory chin tuck was also trialed without any s/s aspiration with trials. Pt. is at risk of some swallow-breathe dyscoordination at times due to 02 dependance during and prior to admit.  Discussed with pt. with importance for using strategies for safety.  Considering her dental discomfort from the tooth fragment(s) and new dentures, pt. voiced complaints of eating some regular solids and would benefit from soft to chew diet to reduce complexity for ease of bolus prep.  D/W RN following eval who reported no observed difficulty when giving po meds.  Precautionary recommendations as follows:  1. soft to chew diet with thin liq as akhil, 2. meds whole with thin  liq or pudding/applesauce as akhil, 3. single sip-swallows, no consecutive swallows, 4. chin tuck with liquids, 5. aspiration precautions, 6. reflux precautions.  -TM           Pharyngeal Phase Concerns    Pharyngeal Phase Concerns cough  1x with large drink of thin liq  -TM        Cough thin  -TM           Esophageal Phase Concerns    Esophageal Phase Concerns other (see comments)  dx of GERD  -TM           SLP Evaluation Clinical Impression    SLP Swallowing Diagnosis suspected pharyngeal dysphagia;other (see comments);esophageal dysphagia  dental issues affect oral comfort with regular po solids  -TM        Functional Impact risk of aspiration/pneumonia  -TM           Recommendations    Therapy Frequency (Swallow) PRN  -TM        Predicted Duration Therapy Intervention (Days) until discharge  -TM        SLP Diet Recommendation regular textures;thin liquids  -TM        Recommended Precautions and Strategies upright posture during/after eating;small bites of food and sips of liquid;chin tuck;reflux precautions;general aspiration precautions;other (see comments)  single sip-swallows, no consecutive swallows  -TM        Oral Care Recommendations Denture Care;Oral Care BID/PRN  -TM        SLP Rec. for Method of Medication  Administration meds whole;with thin liquids;with puree;as tolerated  -        Monitor for Signs of Aspiration yes;notify SLP if any concerns;cough;gurgly voice;throat clearing  -TM        Anticipated Discharge Disposition (SLP) home with assist  -TM              User Key  (r) = Recorded By, (t) = Taken By, (c) = Cosigned By    Initials Name Effective Dates    TM Ashlyn Mendiola 06/16/21 -                 EDUCATION  The patient has been educated in the following areas:   Dysphagia (Swallowing Impairment) Oral Care/Hydration Modified Diet Instruction.              Time Calculation:    Time Calculation- SLP     Row Name 05/24/23 1143             Time Calculation- SLP    SLP Start Time 1040  -TM      SLP Received On 05/24/23  -         Untimed Charges    SLP Eval/Re-eval  ST Eval Oral Pharyng Swallow - 50078  -            User Key  (r) = Recorded By, (t) = Taken By, (c) = Cosigned By    Initials Name Provider Type     Ashlyn Mendiola Speech and Language Pathologist                Therapy Charges for Today     Code Description Service Date Service Provider Modifiers Qty    [id number]  ST EVAL ORAL PHARYNG SWALLOW 4 5/24/2023 Ashlyn Mendiola GN 1               Ashlyn Mendiola  5/24/2023

## 2023-05-24 NOTE — PLAN OF CARE
Goal Outcome Evaluation:           Progress: no change  Outcome Evaluation: Bedside eval of swallow completed with pt. seated upright in bed for po trials.  Pt. denied dysphagia but reported having new dentures in place and dental fragment(s) behind new dentures that bother her at times, but dentist is aware.  Oral mech was WFL.  Pt. was given trials of regular solid, puree, and thin liquid.  Oral phase was WFL with all trials.  Suspect mild pharyngeal phase dysphagia with pt. exhibiting cough 1x after large drink of H20 due to reduced bolus control, but no difficulty over multiple trials of thin liquid.  A compensatory chin tuck was also trialed without any s/s aspiration with trials. Pt. is at risk of some swallow-breathe dyscoordination at times due to 02 dependance during and prior to admit.  Discussed with pt. with importance for using strategies for safety.  Considering her dental discomfort from the tooth fragment(s) and new dentures, pt. voiced complaints of eating some regular solids and would benefit from soft to chew diet to reduce complexity for ease of bolus prep.  D/W RN following eval who reported no observed difficulty when giving po meds.  Precautionary recommendations as follows:  1. soft to chew diet with thin liq as akhil, 2. meds whole with thin  liq or pudding/applesauce as akhil, 3. single sip-swallows, no consecutive swallows, 4. chin tuck with liquids, 5. aspiration precautions, 6. reflux precautions.

## 2023-05-25 ENCOUNTER — READMISSION MANAGEMENT (OUTPATIENT)
Dept: CALL CENTER | Facility: HOSPITAL | Age: 78
End: 2023-05-25
Payer: MEDICARE

## 2023-05-25 VITALS
DIASTOLIC BLOOD PRESSURE: 70 MMHG | HEART RATE: 87 BPM | OXYGEN SATURATION: 100 % | WEIGHT: 166.01 LBS | SYSTOLIC BLOOD PRESSURE: 145 MMHG | TEMPERATURE: 98.9 F | RESPIRATION RATE: 20 BRPM | BODY MASS INDEX: 27.66 KG/M2 | HEIGHT: 65 IN

## 2023-05-25 PROCEDURE — 94799 UNLISTED PULMONARY SVC/PX: CPT

## 2023-05-25 PROCEDURE — 63710000001 PREDNISONE PER 1 MG: Performed by: FAMILY MEDICINE

## 2023-05-25 PROCEDURE — 94664 DEMO&/EVAL PT USE INHALER: CPT

## 2023-05-25 PROCEDURE — 94660 CPAP INITIATION&MGMT: CPT

## 2023-05-25 PROCEDURE — 94761 N-INVAS EAR/PLS OXIMETRY MLT: CPT

## 2023-05-25 PROCEDURE — 25010000002 PIPERACILLIN SOD-TAZOBACTAM PER 1 G: Performed by: INTERNAL MEDICINE

## 2023-05-25 PROCEDURE — 99239 HOSP IP/OBS DSCHRG MGMT >30: CPT | Performed by: FAMILY MEDICINE

## 2023-05-25 RX ORDER — CEFDINIR 300 MG/1
300 CAPSULE ORAL 2 TIMES DAILY
Qty: 10 CAPSULE | Refills: 0 | Status: SHIPPED | OUTPATIENT
Start: 2023-05-25 | End: 2023-05-26 | Stop reason: SDUPTHER

## 2023-05-25 RX ORDER — PREDNISONE 10 MG/1
TABLET ORAL
Qty: 21 TABLET | Refills: 0 | Status: SHIPPED | OUTPATIENT
Start: 2023-05-25 | End: 2023-05-26 | Stop reason: SDUPTHER

## 2023-05-25 RX ADMIN — ATORVASTATIN CALCIUM 20 MG: 20 TABLET, FILM COATED ORAL at 09:08

## 2023-05-25 RX ADMIN — MUPIROCIN 1 APPLICATION: 20 OINTMENT TOPICAL at 09:09

## 2023-05-25 RX ADMIN — APIXABAN 5 MG: 5 TABLET, FILM COATED ORAL at 05:42

## 2023-05-25 RX ADMIN — DULOXETINE HYDROCHLORIDE 60 MG: 30 CAPSULE, DELAYED RELEASE ORAL at 09:07

## 2023-05-25 RX ADMIN — PREDNISONE 40 MG: 20 TABLET ORAL at 09:07

## 2023-05-25 RX ADMIN — ACETAMINOPHEN 650 MG: 325 TABLET, FILM COATED ORAL at 10:40

## 2023-05-25 RX ADMIN — Medication 1 CAPSULE: at 09:08

## 2023-05-25 RX ADMIN — FLUTICASONE PROPIONATE 2 SPRAY: 50 SPRAY, METERED NASAL at 09:09

## 2023-05-25 RX ADMIN — PANTOPRAZOLE SODIUM 40 MG: 40 TABLET, DELAYED RELEASE ORAL at 05:42

## 2023-05-25 RX ADMIN — BUDESONIDE INHALATION 0.5 MG: 0.5 SUSPENSION RESPIRATORY (INHALATION) at 07:19

## 2023-05-25 RX ADMIN — CETIRIZINE HYDROCHLORIDE 10 MG: 10 TABLET, FILM COATED ORAL at 09:08

## 2023-05-25 RX ADMIN — FERROUS SULFATE TAB EC 324 MG (65 MG FE EQUIVALENT) 324 MG: 324 (65 FE) TABLET DELAYED RESPONSE at 09:08

## 2023-05-25 RX ADMIN — TAZOBACTAM SODIUM AND PIPERACILLIN SODIUM 3.38 G: 375; 3 INJECTION, SOLUTION INTRAVENOUS at 03:32

## 2023-05-25 RX ADMIN — CLONAZEPAM 1 MG: 0.5 TABLET ORAL at 10:41

## 2023-05-25 RX ADMIN — DILTIAZEM HYDROCHLORIDE 240 MG: 240 CAPSULE, COATED, EXTENDED RELEASE ORAL at 09:08

## 2023-05-25 RX ADMIN — Medication 10 ML: at 09:09

## 2023-05-25 RX ADMIN — OXYCODONE HYDROCHLORIDE AND ACETAMINOPHEN 1 TABLET: 7.5; 325 TABLET ORAL at 07:26

## 2023-05-25 RX ADMIN — IPRATROPIUM BROMIDE AND ALBUTEROL SULFATE 3 ML: .5; 3 SOLUTION RESPIRATORY (INHALATION) at 07:19

## 2023-05-25 RX ADMIN — IPRATROPIUM BROMIDE AND ALBUTEROL SULFATE 3 ML: .5; 3 SOLUTION RESPIRATORY (INHALATION) at 00:41

## 2023-05-25 NOTE — CASE MANAGEMENT/SOCIAL WORK
Case Management Discharge Note                Selected Continued Care - Discharged on 5/25/2023 Admission date: 5/22/2023 - Discharge disposition: Home or Self Care     Transportation Services  Private: Car    Final Discharge Disposition Code: 01 - home or self-care

## 2023-05-25 NOTE — PLAN OF CARE
Problem: Noninvasive Ventilation Acute  Goal: Effective Unassisted Ventilation and Oxygenation  Outcome: Ongoing, Progressing   Goal Outcome Evaluation:  Refused to wear

## 2023-05-25 NOTE — PROGRESS NOTES
[facility]    Name:  Gabi Dudley   Age:  78 y.o.  Sex:  female  :  1945  MRN:  [id number]   Visit Number:  [id number]  Admission Date:  2023  Date Of Service:  23  Primary Care Physician:  Paul Quinteros MD    Pertinent laboratory and radiology data were reviewed.    Vital signs:    Vital Signs (last 24 hours)        0700   0659  07   Most Recent      Temp (°F) 97.5 -  99.1    97.6 -  98.6     98.2 (36.8) 1926    Heart Rate 79 -  107    96 -  150     96 2032    Resp 16 -      16 -  18     18 2032    /64 -  133/78    135/61 -  176/86     161/81 1926    SpO2 (%) 96 -  100    94 -  98     98 2023          Notified by nursing staff at beginning of shift patient had went into rapid ventricular response with underlying rhythm of A-fib.  Patient resting, no changes in respiratory status, no chest pain.  Heart rates were consistently in the 140s to 150s.  On chart review, patient has known A-fib, is typically on diltiazem but had not been restarted during hospital stay it appears.  She was given 10 mg of IV diltiazem with rates down to the 115 range and was ordered oral diltiazem at 240 mg extended release for what she typically takes at home.  Currently rates appear to be improved in the low 100 range.  Will monitor.    Lucila Juan DO  23  21:09 EDT    Dictated utilizing Dragon dictation.

## 2023-05-25 NOTE — NURSING NOTE
Discussed lack of IV access with hospitalist, advised that patient would be d/c'd on PO abx and does not require another IV, patient unsure of pneumonia vaccine status, informed hospitalist, advised that patient can follow up outpatient for pna vaccine.

## 2023-05-25 NOTE — DISCHARGE SUMMARY
[facility]   DISCHARGE SUMMARY      Name:  Gabi Dudley   Age:  78 y.o.  Sex:  female  :  1945  MRN:  [id number]   Visit Number:  [id number]    Admission Date:  2023  Date of Discharge:  2023  Primary Care Physician:  Paul Quinteros MD    Important issues to note:    -Prescribed cefdinir to finish treatment of her pneumonia  -Steroids taper with prednisone on discharge  -Follow-up with pulmonology  -Follow-up with PCP for anemia and incidental adrenal gland lesion    Discharge Diagnoses:     1. Acute on chronic hypoxic and hypercapnic respiratory failure, POA.,  Improved  2. Acute COPD exacerbation.,  Improved  3. Right middle and lower lobe healthcare associated pneumonia, unable to classify further, POA.,  Improved  4. Paroxysmal atrial fibrillation on apixaban.  5. Essential hypertension.  6. Chronic anemia.    Problem List:     Active Hospital Problems    Diagnosis  POA   • **Acute respiratory failure with hypoxia and hypercapnia [J96.01, J96.02]  Yes   • Aspiration pneumonia of right lower lobe [J69.0]  Yes   • Impaired mobility and ADLs [Z74.09, Z78.9]  Yes   • Acute on chronic respiratory failure with hypoxia and hypercapnia [J96.21, J96.22]  Yes   • COPD exacerbation [J44.1]  Yes   • Paroxysmal atrial fibrillation (HCC) [I48.0]  Yes   • Adrenal adenoma [D35.00]  Yes   • Essential hypertension [I10]  Yes      Resolved Hospital Problems   No resolved problems to display.     Presenting Problem:    Chief Complaint   Patient presents with   • Shortness of Breath      Consults:     Consulting Physician(s)             None          Procedures Performed:        History of presenting illness/Hospital Course:    Ms. Dudley is a 78-year-old female with history of atrial fibrillation on apixaban, COPD on home oxygen at 3 L, coronary artery disease, GERD, who was recently discharged from this hospital on 2023 after treatment of COPD exacerbation was brought to the  emergency room by EMS with symptoms of shortness of breath that has been worsening in the last couple of days.  Patient states that she has been doing fairly well until about 2 days ago when she developed worsening shortness of breath and cough.  She has been using the flutter valve at home but states that it has been increasing her cough.  According to the daughter, patient does have trilogy machine at home but has not been compliant.  She quit smoking in 2020.  She does live with her daughter and uses a walker for ambulation.  Patient denies any history of fever or chest pain.     Patient was discharged to [facility] on her last admission, she stayed there for a day then signed herself AGAINST MEDICAL ADVICE and went home.      In the emergency room, she was afebrile and hemodynamically stable but required 6 L of nasal cannula oxygen to maintain saturations above 94%.  Initial ABG showed a pH of 7.32, PCO2 74, PO2 69 and bicarb of 38 on 6 L of nasal cannula oxygen.  Initial troponin T was 20.  proBNP was 1664.  CMP and CBC was unremarkable except for a white count of 12 and a hemoglobin of 7 which seems to be her baseline.  Blood cultures were drawn in the emergency room.  CT angiogram done in the emergency room was negative for pulmonary embolism but showed right upper middle and lower lobe infiltrate.  Incidental finding of left adrenal lesion at 25 mm was noted.  Patient was given IV antibiotics therapy with Zosyn, morphine and Zofran in the emergency room and is currently being admitted to the medical floor with telemetry.    Respiratory failure/COPD exacerbation/pneumonia.  - Continue nasal cannula oxygen as well as intermittent BiPAP therapy to keep pulse oxygen saturations above 90%.  Titrated down.  Patient on 3 L of oxygen at baseline.  - Continue bronchodilators, budesonids and mucolytic agents.  -Blood cultures remain negative.  - nasal swab screen for MRSA positive, likely colonization, started  Bactroban  - Received piperacillin/tazobactam, and switched to cefdinir on discharge.  Pro-Aung WNL.   - Lactobacillus supplements.  -Tapered Solu-Medrol down to p.o. prednisone.  Will be discharged on steroids taper at home.     Chronic iron deficiency anemia  -Hemoglobin at 7.1, no overt bleeding, chronically low per previous admissions.  - Given IV iron x1  -Restart iron supplements  -Instructed on following up with her PCP    Atrial fibrillation/essential hypertension.  - Continue home medications including apixaban, atorvastatin, Cardizem CD.  -Patient went into rapid ventricular response with a heart rate of 140s to 150s, noticed that Cardizem was not given during hospitalization, heart rate improved and was rate controlled after restarting her home Cardizem.  Heart rate currently in the 80s on discharge.     Adrenal lesion on imaging  -Incidental finding of left adrenal lesion at 25 mm was noted.   -Follow-up as an outpatient with her PCP.     Patient was seen and examined on day of discharge.  Patient reports feeling significantly better and is eager to go home.  Patient reports that her daughter is going to come and pick her up and will be taking care of her at home, she might hire someone to help her with her needs at home.  Patient denies any needs and declines home health or rehab.  Patient denies any chest pain.  Patient reports improvement on her respiratory status and denies any shortness of breath at rest.  Patient on her baseline supplemental oxygen and appears comfortable unlabored and with no distress.  Patient denies any acute complaints. Patient will be discharged on cefdinir and steroids to finish treatment.  Patient follow-up with pulmonology as an outpatient.  Patient to follow-up closely with her PCP for adrenal lesion on imaging and anemia.  Patient instructed in details on management and plan.  Patient verbalized understanding and agreeable.  All questions were answered to her  satisfaction.    Vital Signs:    Temp:  [98.2 °F (36.8 °C)-98.7 °F (37.1 °C)] 98.7 °F (37.1 °C)  Heart Rate:  [] 88  Resp:  [16-18] 16  BP: (116-176)/(63-86) 131/63    Physical Exam:    General Appearance:  Alert and cooperative.  Chronically ill-appearing.  No acute distress.   Head:  Atraumatic and normocephalic.   Eyes: Conjunctivae and sclerae normal, no icterus. No pallor.   Ears:  Ears with no abnormalities noted.   Throat: No oral lesions, no thrush, oral mucosa moist.   Neck: Supple, trachea midline, no thyromegaly.   Back:   No tenderness to palpation.   Lungs:   Breath sounds heard bilaterally equally.  Improving expiratory wheezing.  No crackles heard.  Prolonged expiration.  Unlabored, nontachypneic.     Heart:  Normal S1 and S2, no murmur, no gallop, no rub. No JVD.   Abdomen:   Normal bowel sounds, no masses, no organomegaly. Soft, nontender, nondistended, no rebound tenderness.   Extremities: Supple, no edema, no cyanosis, no clubbing.   Pulses: Pulses palpable bilaterally.   Skin: No bleeding or rash.   Neurologic: Alert and oriented x 3. No facial asymmetry. Moves all four limbs. No tremors.     Pertinent Lab Results:     Results from last 7 days   Lab Units 05/24/23  0622 05/23/23  0632 05/22/23  1811   SODIUM mmol/L 144 141 137   POTASSIUM mmol/L 4.0 4.8 4.5   CHLORIDE mmol/L 104 100 95*   CO2 mmol/L 32.4* 34.3* 35.0*   BUN mg/dL 20 18 24*   CREATININE mg/dL 0.60 0.59 0.64   CALCIUM mg/dL 8.4* 8.5* 8.6   BILIRUBIN mg/dL  --   --  0.2   ALK PHOS U/L  --   --  70   ALT (SGPT) U/L  --   --  30   AST (SGOT) U/L  --   --  26   GLUCOSE mg/dL 144* 143* 135*     Results from last 7 days   Lab Units 05/24/23  0622 05/23/23  0632 05/22/23  1811   WBC 10*3/mm3 8.60 7.86 12.07*   HEMOGLOBIN g/dL 7.1* 7.6* 7.0*   HEMATOCRIT % 26.2* 28.8* 26.4*   PLATELETS 10*3/mm3 200 189 188         Results from last 7 days   Lab Units 05/22/23  1811   HSTROP T ng/L 20*     Results from last 7 days   Lab Units  05/22/23  1811   PROBNP pg/mL 1,664.0             Results from last 7 days   Lab Units 05/22/23  1843   PH, ARTERIAL pH units 7.318*   PO2 ART mm Hg 69.2*   PCO2, ARTERIAL mm Hg 74.3*   HCO3 ART mmol/L 38.1*     Results from last 7 days   Lab Units 05/22/23  2217 05/22/23  2204   BLOODCX  No growth at 2 days No growth at 2 days       Pertinent Radiology Results:    Imaging Results (All)     Procedure Component Value Units Date/Time    XR Chest 1 View [[id number]] Collected: 05/23/23 0719     Updated: 05/23/23 0826    Narrative:      CLINICAL INDICATION:    SOA Triage Protocol shortness of breath.      EXAMINATION TECHNIQUE:   XR CHEST 1 VW-     COMPARISON:  Radiographs 05/05/2023.     FINDINGS:  Right mid and lower lung zone ill-defined airspace opacities. Severe  emphysema. Mild bibasilar scarring/atelectasis with increased  reticulation. Mild cardiomegaly. Aortic atherosclerosis. Enlarged  pulmonary arteries, suggestive of pulmonary arterial hypertension.  Bilateral perihilar vascular crowding. No acute osseous or soft tissue  abnormality. No free air under hemidiaphragms.       Impression:      Right mid and lower lung zone ill-defined airspace opacities, concerning  for pneumonia. Recommended follow up to resolution.     Images personally reviewed, interpreted and dictated by SHAMIKA Sanchez.                This report was signed and finalized on 5/23/2023 8:24 AM by SHAMIKA Sanchez.    CT Angiogram Chest Pulmonary Embolism [[id number]] Collected: 05/22/23 2234     Updated: 05/22/23 2236    Narrative:      FINAL REPORT    TECHNIQUE:  Postcontrast axial images of the chest were performed in a CTA  protocol. The study was performed with techniques to keep  radiation dose as low as reasonably achievable, (ALARA).  Individual dose reduction techniques using automated exposure  control or adjustment of mA and/or kV according to the patient's  size were employed.    CLINICAL HISTORY:  soa  weakness,  mid-sternal cp, pressure    FINDINGS:  There is a 25 mm hypodense nonspecific left adrenal lesion.  There is no axillary adenopathy.  There is no hilar or  mediastinal adenopathy.  The heart size is normal.  There is no  pericardial or pleural effusion.  No filling defects are  identified to suggest PE.  There is no aortic dissection.  Limited images of the upper abdomen are unremarkable.  There is  hyperinflation.  There is consolidation in the medial right  upper lobe, inferior right middle lobe and right lower lobe.  No  suspicious nodule is identified.  Multiple wedge compression  fractures appear chronic.      Impression:      No PE or dissection.  Patchy airspace consolidation probably  related to pneumonia.  Follow-up to resolution recommended.    Authenticated and Electronically Signed by Jasiel Browne MD  on 05/22/2023 10:34:38 PM          Echo:    Results for orders placed in visit on 11/02/22    Adult Transthoracic Echo Complete W/ Cont if Necessary Per Protocol    Interpretation Summary  1.  Normal left ventricular size and systolic function, LVEF 60-65%.  2.  Mild concentric LVH.  3.  Normal LV diastolic filling pattern.  4.  Normal right ventricular size and systolic function.  5.  Mildly increased left atrial volume index.  6.  No significant valvular abnormalities.    Condition on Discharge:      Stable.    Code status during the hospital stay:    Code Status and Medical Interventions:   Ordered at: 05/22/23 7588     Level Of Support Discussed With:    Health Care Surrogate    Patient     Code Status (Patient has no pulse and is not breathing):    CPR (Attempt to Resuscitate)     Medical Interventions (Patient has pulse or is breathing):    Full Support     Discharge Disposition:    Home or Self Care    Discharge Medications:       Discharge Medications      New Medications      Instructions Start Date   cefdinir 300 MG capsule  Commonly known as: OMNICEF   300 mg, Oral, 2 Times Daily       mupirocin 2 % ointment  Commonly known as: BACTROBAN   Topical, Every 12 Hours Scheduled      predniSONE 10 MG tablet  Commonly known as: DELTASONE   Take 4 tablets by mouth Daily for 3 days, THEN 2 tablets Daily for 3 days, THEN 1 tablet Daily for 3 days.   Start Date: May 25, 2023        Changes to Medications      Instructions Start Date   DULoxetine 60 MG capsule  Commonly known as: CYMBALTA  What changed: when to take this   60 mg, Oral, 2 Times Daily         Continue These Medications      Instructions Start Date   albuterol sulfate  (90 Base) MCG/ACT inhaler  Commonly known as: PROVENTIL HFA;VENTOLIN HFA;PROAIR HFA   INHALE 2 PUFFS BY MOUTH EVERY FOUR HOURS AS NEEDED FOR WHEEZING      apixaban 5 MG tablet tablet  Commonly known as: Eliquis   5 mg, Oral, Every 12 Hours      atorvastatin 20 MG tablet  Commonly known as: LIPITOR   TAKE 1 TABLET BY MOUTH DAILY      Breztri Aerosphere 160-9-4.8 MCG/ACT aerosol inhaler  Generic drug: Budeson-Glycopyrrol-Formoterol   2 puffs, Inhalation, 2 Times Daily, Rinse mouth out after use      cetirizine 10 MG tablet  Commonly known as: zyrTEC   10 mg, Oral, Daily      clonazePAM 1 MG tablet  Commonly known as: KlonoPIN   1 mg, Oral, 2 Times Daily PRN      dilTIAZem  MG 24 hr capsule  Commonly known as: CARDIZEM CD   240 mg, Oral, Daily      ferrous sulfate 324 (65 Fe) MG tablet delayed-release EC tablet   324 mg, Oral, Daily With Breakfast      fluticasone 50 MCG/ACT nasal spray  Commonly known as: FLONASE   2 sprays, Nasal, Daily      furosemide 40 MG tablet  Commonly known as: LASIX   40 mg, Oral, Daily      guaiFENesin-dextromethorphan 100-10 MG/5ML syrup  Commonly known as: ROBITUSSIN DM   10 mL, Oral, Every 6 Hours PRN      ipratropium-albuterol 0.5-2.5 mg/3 ml nebulizer  Commonly known as: DUO-NEB   USE 3 mL VIA NEBULIZER EVERY 4 HOURS AS NEEDED FOR WHEEZING      methocarbamol 500 MG tablet  Commonly known as: Robaxin   1 qid po prn      O2  Commonly  known as: OXYGEN   2 L/min, Inhalation, As Needed      ondansetron ODT 4 MG disintegrating tablet  Commonly known as: ZOFRAN-ODT   4 mg, Translingual, Every 8 Hours PRN      oxyCODONE-acetaminophen 7.5-325 MG per tablet  Commonly known as: PERCOCET   1 tablet, Oral, Every 4 Hours PRN      pantoprazole 40 MG EC tablet  Commonly known as: PROTONIX   40 mg, Oral, Daily      sertraline 100 MG tablet  Commonly known as: ZOLOFT   TAKE 1 & 1/2 TABLET BY MOUTH DAILY      sodium chloride 0.65 % nasal spray   2 sprays, Nasal, As Needed      traZODone 100 MG tablet  Commonly known as: DESYREL   1 qhs po prn      vitamin D3 125 MCG (5000 UT) capsule capsule   5,000 Units, Oral, Daily         Stop These Medications    benzonatate 100 MG capsule  Commonly known as: Tessalon Perles     clotrimazole-betamethasone 1-0.05 % cream  Commonly known as: Lotrisone     ondansetron 4 MG tablet  Commonly known as: ZOFRAN          Discharge Diet:     Diet Instructions     Diet: Cardiac Diets; Healthy Heart (2-3 Na+); Soft to Chew (NDD 3); Whole Meat; Thin (IDDSI 0)      Discharge Diet: Cardiac Diets    Cardiac Diet: Healthy Heart (2-3 Na+)    Texture: Soft to Chew (NDD 3)    Soft to Chew: Whole Meat    Fluid Consistency: Thin (IDDSI 0)        Activity at Discharge:   As tolerated    Follow-up Appointments:     Follow-up Information     Paul Quinteros MD Follow up in 3 day(s).    Specialty: Internal Medicine  Why: Anemia follow-up, incidental adrenal gland lesion on imaging  Contact information:  [address]  [address]  [facility] [id number] [phone]             Herbert Poe MD .    Specialties: Pulmonary Disease, Sleep Medicine  Why: Chronic respiratory failure, COPD  Contact information:  [address]  [address]  [facility] [id number] [phone]                       Future Appointments   Date Time Provider Department Center   5/30/2023  3:30 PM Britt Farrar APRN [facility] CD [facility]   7/17/2023  2:00 PM Fatmata  REGINA Molina [facility]   8/14/2023  4:00 PM Taiwo Curry MD [facility]   10/20/2023  3:00 PM RM 4 - CHAIR 1 BH [facility] OP INF [facility]     Test Results Pending at Discharge:    Pending Labs     Order Current Status    Blood Culture - Blood, Arm, Left Preliminary result    Blood Culture - Blood, Arm, Left Preliminary result             Tom Keyes MD  05/25/23  09:04 EDT    Time: I spent 37 minutes on this discharge activity which included: face-to-face encounter with the patient, reviewing the data in the system, coordination of the care with the nursing staff as well as consultants, documentation, and entering orders.     Dictated utilizing Dragon dictation.

## 2023-05-25 NOTE — PLAN OF CARE
Problem: Adult Inpatient Plan of Care  Goal: Plan of Care Review  Outcome: Ongoing, Progressing     Problem: Pain Acute  Goal: Acceptable Pain Control and Functional Ability  Outcome: Ongoing, Progressing  Intervention: Prevent or Manage Pain  Recent Flowsheet Documentation  Taken 5/25/2023 0200 by Ashleigh Pedro RN  Medication Review/Management: medications reviewed  Taken 5/25/2023 0000 by Ashleigh Pedro RN  Medication Review/Management: medications reviewed  Taken 5/24/2023 2200 by Ashleigh Pedro RN  Medication Review/Management: medications reviewed  Taken 5/24/2023 2000 by Ashleigh Pedro RN  Medication Review/Management: medications reviewed     Problem: Gas Exchange Impaired  Goal: Optimal Gas Exchange  Outcome: Ongoing, Progressing  Intervention: Optimize Oxygenation and Ventilation  Recent Flowsheet Documentation  Taken 5/25/2023 0200 by Ashleigh Pedro RN  Head of Bed (HOB) Positioning: HOB lowered  Taken 5/25/2023 0000 by Ashleigh Pedro RN  Head of Bed (HOB) Positioning: HOB lowered  Taken 5/24/2023 2200 by Ashleigh Pedro RN  Head of Bed (HOB) Positioning: HOB elevated  Taken 5/24/2023 2000 by Ashleigh Pedro RN  Head of Bed (HOB) Positioning: HOB elevated     Problem: Infection (Pneumonia)  Goal: Resolution of Infection Signs and Symptoms  Outcome: Ongoing, Progressing     Problem: Respiratory Compromise (Pneumonia)  Goal: Effective Oxygenation and Ventilation  Outcome: Ongoing, Progressing  Intervention: Optimize Oxygenation and Ventilation  Recent Flowsheet Documentation  Taken 5/25/2023 0200 by Ashleigh Pedro RN  Head of Bed (HOB) Positioning: HOB lowered  Taken 5/25/2023 0000 by Ashleigh Pedro RN  Head of Bed (HOB) Positioning: HOB lowered  Taken 5/24/2023 2200 by Ashleigh Pedro RN  Head of Bed (HOB) Positioning: HOB elevated  Taken 5/24/2023 2000 by Ashleigh Pedro RN  Head of Bed (HOB) Positioning: HOB elevated   Goal Outcome Evaluation:      Plan of  care reviewed with patient. Patient did have a jump in her HR as high as 150s. Patient was given a one time dose of IV Cardizem on day shift and then given oral cardizem tonight. Patient converted back to NSR early on in the shift. Patient remains on 4L NC which is patient's baseline. No other significant changes this shift. Possible discharge back home with family today.

## 2023-05-25 NOTE — NURSING NOTE
Contacted pt daughter Malu to inform her of d/c no answer, left voicemail message for her to call back

## 2023-05-25 NOTE — OUTREACH NOTE
Prep Survey    Flowsheet Row Responses   [facility] patient discharged from? [facility]   Is LACE score < 7 ? No   Eligibility [facility]   Date of Admission 05/22/23   Date of Discharge 05/25/23   Discharge Disposition Home or Self Care   Discharge diagnosis Acute COPD exacerbation, Right middle and lower lobe healthcare associated pneumonia   Does the patient have one of the following disease processes/diagnoses(primary or secondary)? Pneumonia   Does the patient have Home health ordered? No   Is there a DME ordered? No   Prep survey completed? Yes          Cait NICK - Registered Nurse

## 2023-05-25 NOTE — CASE MANAGEMENT/SOCIAL WORK
Continued Stay Note  FRANCIS Jean     Patient Name: Gabi Dudley  MRN: [id number]  Today's Date: 5/25/2023    Admit Date: 5/22/2023    Plan: spoke with pt in room this am; completed imm with pt; stated daughter will transport   Discharge Plan     Row Name 05/25/23 1134       Plan    Plan spoke with pt in room this am; completed imm with pt; stated daughter will transport and she does not want hh               Discharge Codes    No documentation.               Expected Discharge Date and Time     Expected Discharge Date Expected Discharge Time    May 25, 2023             Gale Thakkar RN

## 2023-05-25 NOTE — DISCHARGE INSTRUCTIONS
- Take and finish your antibiotics as prescribed.  -Take prednisone steroids taper as ordered, avoid stopping steroids suddenly.  -Follow-up with pulmonology as an outpatient as discussed.  -Follow-up with your primary care provider in 2 to 3 days for follow-up on adrenal gland lesion found on imaging, follow-up on anemia and continued care.

## 2023-05-26 ENCOUNTER — HOSPITAL ENCOUNTER (EMERGENCY)
Facility: HOSPITAL | Age: 78
Discharge: HOME | End: 2023-05-27
Attending: EMERGENCY MEDICINE
Payer: MEDICARE

## 2023-05-26 ENCOUNTER — TRANSITIONAL CARE MANAGEMENT TELEPHONE ENCOUNTER (OUTPATIENT)
Dept: CALL CENTER | Facility: HOSPITAL | Age: 78
End: 2023-05-26
Payer: MEDICARE

## 2023-05-26 ENCOUNTER — APPOINTMENT (OUTPATIENT)
Dept: GENERAL RADIOLOGY | Facility: HOSPITAL | Age: 78
End: 2023-05-26
Payer: MEDICARE

## 2023-05-26 ENCOUNTER — NURSE TRIAGE (OUTPATIENT)
Dept: CALL CENTER | Facility: HOSPITAL | Age: 78
End: 2023-05-26
Payer: MEDICARE

## 2023-05-26 DIAGNOSIS — I48.0 PAROXYSMAL ATRIAL FIBRILLATION: Primary | ICD-10-CM

## 2023-05-26 LAB
ALBUMIN SERPL-MCNC: 3.8 G/DL (ref 3.5–5.2)
ALBUMIN/GLOB SERPL: 1.7 G/DL
ALP SERPL-CCNC: 79 U/L (ref 39–117)
ALT SERPL W P-5'-P-CCNC: 15 U/L (ref 1–33)
ANION GAP SERPL CALCULATED.3IONS-SCNC: 9.9 MMOL/L (ref 5–15)
ANISOCYTOSIS BLD QL: NORMAL
AST SERPL-CCNC: 12 U/L (ref 1–32)
BASOPHILS # BLD AUTO: 0.01 10*3/MM3 (ref 0–0.2)
BASOPHILS NFR BLD AUTO: 0.1 % (ref 0–1.5)
BILIRUB SERPL-MCNC: <0.2 MG/DL (ref 0–1.2)
BUN SERPL-MCNC: 22 MG/DL (ref 8–23)
BUN/CREAT SERPL: 34.9 (ref 7–25)
CALCIUM SPEC-SCNC: 9.2 MG/DL (ref 8.6–10.5)
CHLORIDE SERPL-SCNC: 100 MMOL/L (ref 98–107)
CO2 SERPL-SCNC: 31.1 MMOL/L (ref 22–29)
CREAT SERPL-MCNC: 0.63 MG/DL (ref 0.57–1)
DEPRECATED RDW RBC AUTO: 66.3 FL (ref 37–54)
EGFRCR SERPLBLD CKD-EPI 2021: 92.1 ML/MIN/1.73
EOSINOPHIL # BLD AUTO: 0 10*3/MM3 (ref 0–0.4)
EOSINOPHIL NFR BLD AUTO: 0 % (ref 0.3–6.2)
ERYTHROCYTE [DISTWIDTH] IN BLOOD BY AUTOMATED COUNT: 22.4 % (ref 12.3–15.4)
GLOBULIN UR ELPH-MCNC: 2.3 GM/DL
GLUCOSE SERPL-MCNC: 141 MG/DL (ref 65–99)
HCT VFR BLD AUTO: 29.2 % (ref 34–46.6)
HGB BLD-MCNC: 7.8 G/DL (ref 12–15.9)
HOLD SPECIMEN: NORMAL
HOLD SPECIMEN: NORMAL
HYPOCHROMIA BLD QL: NORMAL
IMM GRANULOCYTES # BLD AUTO: 0.03 10*3/MM3 (ref 0–0.05)
IMM GRANULOCYTES NFR BLD AUTO: 0.4 % (ref 0–0.5)
LYMPHOCYTES # BLD AUTO: 0.35 10*3/MM3 (ref 0.7–3.1)
LYMPHOCYTES NFR BLD AUTO: 5.2 % (ref 19.6–45.3)
MAGNESIUM SERPL-MCNC: 1.6 MG/DL (ref 1.6–2.4)
MCH RBC QN AUTO: 21.9 PG (ref 26.6–33)
MCHC RBC AUTO-ENTMCNC: 26.7 G/DL (ref 31.5–35.7)
MCV RBC AUTO: 82 FL (ref 79–97)
MONOCYTES # BLD AUTO: 0.25 10*3/MM3 (ref 0.1–0.9)
MONOCYTES NFR BLD AUTO: 3.7 % (ref 5–12)
NEUTROPHILS NFR BLD AUTO: 6.13 10*3/MM3 (ref 1.7–7)
NEUTROPHILS NFR BLD AUTO: 90.6 % (ref 42.7–76)
NRBC BLD AUTO-RTO: 0 /100 WBC (ref 0–0.2)
PLATELET # BLD AUTO: 248 10*3/MM3 (ref 140–450)
PMV BLD AUTO: 10.2 FL (ref 6–12)
POLYCHROMASIA BLD QL SMEAR: NORMAL
POTASSIUM SERPL-SCNC: 4.1 MMOL/L (ref 3.5–5.2)
PROT SERPL-MCNC: 6.1 G/DL (ref 6–8.5)
RBC # BLD AUTO: 3.56 10*6/MM3 (ref 3.77–5.28)
SMALL PLATELETS BLD QL SMEAR: ADEQUATE
SODIUM SERPL-SCNC: 141 MMOL/L (ref 136–145)
TROPONIN T SERPL HS-MCNC: 16 NG/L
WBC MORPH BLD: NORMAL
WBC NRBC COR # BLD: 6.77 10*3/MM3 (ref 3.4–10.8)
WHOLE BLOOD HOLD COAG: NORMAL
WHOLE BLOOD HOLD SPECIMEN: NORMAL

## 2023-05-26 PROCEDURE — 99283 EMERGENCY DEPT VISIT LOW MDM: CPT

## 2023-05-26 PROCEDURE — 83735 ASSAY OF MAGNESIUM: CPT | Performed by: EMERGENCY MEDICINE

## 2023-05-26 PROCEDURE — 84484 ASSAY OF TROPONIN QUANT: CPT | Performed by: EMERGENCY MEDICINE

## 2023-05-26 PROCEDURE — 71045 X-RAY EXAM CHEST 1 VIEW: CPT

## 2023-05-26 PROCEDURE — 85025 COMPLETE CBC W/AUTO DIFF WBC: CPT | Performed by: EMERGENCY MEDICINE

## 2023-05-26 PROCEDURE — 80053 COMPREHEN METABOLIC PANEL: CPT | Performed by: EMERGENCY MEDICINE

## 2023-05-26 PROCEDURE — 85007 BL SMEAR W/DIFF WBC COUNT: CPT | Performed by: EMERGENCY MEDICINE

## 2023-05-26 PROCEDURE — 93005 ELECTROCARDIOGRAM TRACING: CPT | Performed by: EMERGENCY MEDICINE

## 2023-05-26 PROCEDURE — 36415 COLL VENOUS BLD VENIPUNCTURE: CPT

## 2023-05-26 RX ORDER — SODIUM CHLORIDE 0.9 % (FLUSH) 0.9 %
10 SYRINGE (ML) INJECTION AS NEEDED
Status: DISCONTINUED | OUTPATIENT
Start: 2023-05-26 | End: 2023-05-27 | Stop reason: HOSPADM

## 2023-05-26 RX ORDER — CEFDINIR 300 MG/1
300 CAPSULE ORAL 2 TIMES DAILY
Qty: 10 CAPSULE | Refills: 0 | Status: SHIPPED | OUTPATIENT
Start: 2023-05-26 | End: 2023-05-31

## 2023-05-26 RX ORDER — PREDNISONE 10 MG/1
TABLET ORAL
Qty: 21 TABLET | Refills: 0 | Status: SHIPPED | OUTPATIENT
Start: 2023-05-26 | End: 2023-06-04

## 2023-05-26 NOTE — OUTREACH NOTE
Call Center TCM Note    Flowsheet Row Responses   [facility] patient discharged from? [facility]   Does the patient have one of the following disease processes/diagnoses(primary or secondary)? Pneumonia   TCM attempt successful? Yes   Call start time 1516   Call end time 1519   Discharge diagnosis Acute COPD exacerbation, Right middle and lower lobe healthcare associated pneumonia   Meds reviewed with patient/caregiver? Yes   Is the patient having any side effects they believe may be caused by any medication additions or changes? No   Does the patient have all medications ordered at discharge? Yes   Is the patient taking all medications as directed (includes completed medication regime)? Yes   Comments Hospital f/u with Dr. Orourke on 6/2   Does the patient have an appointment with their PCP within 7 days of discharge? Yes   Has home health visited the patient within 72 hours of discharge? N/A   Psychosocial issues? No   Did the patient receive a copy of their discharge instructions? Yes   Nursing interventions Reviewed instructions with patient   What is the patient's perception of their health status since discharge? Improving   Nursing Interventions Nurse provided patient education   Is the patient/caregiver able to teach back the hierarchy of who to call/visit for symptoms/problems? PCP, Specialist, Home health nurse, Urgent Care, ED, 911 Yes   Is the patient/caregiver able to teach back signs and symptoms of worsening condition: Shortness of breath, Fever/chills, Chest pain   Is the patient/caregiver able to teach back importance of completing antibiotic course of treatment? Yes   TCM call completed? Yes   Wrap up additional comments Doing well, no questions, confirmed f/u appt with PCP for 6/2.   Call end time 1519   Would this patient benefit from a Referral to Amb Social Work? No   Is the patient interested in additional calls from an ambulatory ?  NOTE:  applies to high risk patients requiring  additional follow-up. No          Jessie Wood RN    5/26/2023, 15:20 EDT

## 2023-05-26 NOTE — OUTREACH NOTE
Call Center TCM Note    Flowsheet Row Responses   [facility] patient discharged from? [facility]   Does the patient have one of the following disease processes/diagnoses(primary or secondary)? Pneumonia   TCM attempt successful? No   Unsuccessful attempts Attempt 1          Jessie Wood RN    5/26/2023, 12:42 EDT

## 2023-05-27 VITALS
BODY MASS INDEX: 26.66 KG/M2 | OXYGEN SATURATION: 100 % | RESPIRATION RATE: 20 BRPM | HEIGHT: 65 IN | TEMPERATURE: 97.9 F | HEART RATE: 79 BPM | WEIGHT: 160 LBS | DIASTOLIC BLOOD PRESSURE: 82 MMHG | SYSTOLIC BLOOD PRESSURE: 158 MMHG

## 2023-05-27 LAB
BACTERIA SPEC AEROBE CULT: NORMAL
BACTERIA SPEC AEROBE CULT: NORMAL

## 2023-05-27 RX ORDER — DILTIAZEM HYDROCHLORIDE 120 MG/1
240 CAPSULE, COATED, EXTENDED RELEASE ORAL DAILY
Qty: 60 CAPSULE | Refills: 0 | Status: SHIPPED | OUTPATIENT
Start: 2023-05-27

## 2023-05-27 NOTE — ED PROVIDER NOTES
HPI: Gabi Dudley is a 78 y.o. female who presents to the emergency department complaining of palpitations.  Patient states that she was just discharged from the hospital yesterday after being treated for pneumonia.  She was also in A-fib, treated with Cardizem.  She notes that since being home her heart rate has been up and down, as high as 140s.  She tells me she was not told any parameters for her heart rate on discharge yesterday, called on-call nurse and was instructed to come to the ED.  She denies any chest pain, increased shortness of breath, fevers, chills, nausea, vomiting or other complaints.      REVIEW OF SYSTEMS: All other systems reviewed and are negative     PAST MEDICAL HISTORY:   Past Medical History:   Diagnosis Date   • Adrenal adenoma    • Anemia    • Arrhythmia    • Asthma    • Atrial fibrillation    • Back pain    • Benign colonic polyp    • Benign tumor of adrenal gland    • Cataract     bilateral   • Cholelithiasis    • Chronic bronchitis    • Chronic bronchitis with COPD (chronic obstructive pulmonary disease)    • COPD (chronic obstructive pulmonary disease) 2005   • Coronary artery disease    • Depression    • Elevated cholesterol    • Environmental and seasonal allergies    • Fibromyalgia    • Gastritis    • Generalized anxiety disorder    • GERD (gastroesophageal reflux disease)    • H/O mammogram    • Hemorrhoids    • History of blood transfusion 1985   • History of blood transfusion 1985   • History of echocardiogram    • History of endometriosis    • History of nuclear stress test    • Hypertension    • IBS (irritable bowel syndrome)    • Impaired functional mobility, balance, gait, and endurance    • Impaired mobility    • Inverted nipple    • Kidney disease    • Kidney stone    • Liver cyst    • Nodular radiologic density    • Nodule of left lung    • On home oxygen therapy     2 liters NC QHS   • Osteoarthritis    • Osteoporosis    • PONV (postoperative nausea and vomiting)     • Renal cyst    • Sinus problem        • Sinusitis    • Skin cancer     basal cell carcinoma   • SOB (shortness of breath)    • Tobacco use    • Urinary frequency    • Vitamin D deficiency    • Wears glasses    • Wears partial dentures     upper plate        FAMILY HISTORY:   Family History   Problem Relation Age of Onset   • Stomach cancer Brother    • Colon cancer Maternal Aunt    • Brain cancer Father    • Arthritis Father    • Hypertension Father    • Lung cancer Paternal Grandfather    • Breast cancer Neg Hx    • Ovarian cancer Neg Hx         SOCIAL HISTORY:   Social History     Socioeconomic History   • Marital status:    Tobacco Use   • Smoking status: Former     Packs/day: 0.25     Years: 30.00     Pack years: 7.50     Types: Cigarettes     Quit date: 2020     Years since quittin.5     Passive exposure: Past   • Smokeless tobacco: Never   Vaping Use   • Vaping Use: Never used   Substance and Sexual Activity   • Alcohol use: No   • Drug use: No   • Sexual activity: Defer        SURGICAL HISTORY:   Past Surgical History:   Procedure Laterality Date   • APPENDECTOMY     • CARDIAC CATHETERIZATION     • CATARACT EXTRACTION      both eyes   • CHOLECYSTECTOMY WITH INTRAOPERATIVE CHOLANGIOGRAM N/A 10/30/2020    Procedure: CHOLECYSTECTOMY LAPAROSCOPIC INTRAOPERATIVE CHOLANGIOGRAPHY;  Surgeon: Sima Moses MD;  Location: [facility] OR;  Service: General;  Laterality: N/A;   • COLONOSCOPY  2013   • COLONOSCOPY  2016   • COLONOSCOPY N/A 2019    Procedure: COLONOSCOPY W/ COLD FORCEP POLYPECTOMIES; HOT SNARE POLYPECTOMIES; COLD SNARE POLYPECTOMY;  Surgeon: Goyo Nunez MD;  Location: [facility] ENDOSCOPY;  Service: Gastroenterology   • COLONOSCOPY W/ BIOPSIES AND POLYPECTOMY     • EXPLORATORY LAPAROTOMY N/A 2020    Procedure: colectomy, right, closure of enterotomy x 2, reduction of internal volvulus;  Surgeon: Sima Moses MD;  Location: [facility] OR;  Service:  General;  Laterality: N/A;   • HYSTERECTOMY  1980s    partial   • LYSIS OF ABDOMINAL ADHESIONS     • TONSILLECTOMY  1987   • UPPER GASTROINTESTINAL ENDOSCOPY  01/13/2015   • VAGINAL DELIVERY      x2        ALLERGIES: Doxycycline       PHYSICAL EXAM:   VITAL SIGNS:   Vitals:    05/26/23 2216   BP: 102/83   Pulse: 119   Resp: 20   Temp: 97.9 °F (36.6 °C)   SpO2: 95%      CONSTITUTIONAL: Awake, well appearing, nontoxic   HENT: Atraumatic, normocephalic, oral mucosa moist, airway patent. Nares patent without drainage. External ears normal.   EYES: Conjunctivae clear, EOMI, PERRL   NECK: Trachea midline, nontender, supple   CARDIOVASCULAR: Irregularly irregular, rate 120s  PULMONARY/CHEST: Normal work of breathing. Clear to auscultation, no rhonchi, wheezes, or rales.  ABDOMINAL: Nondistended, soft, nontender, no rebound or guarding.  NEUROLOGIC: Nonfocal, moves all four extremities, no gross sensory or motor deficits.   EXTREMITIES: Trace lower extremity edema  SKIN: Warm, Dry, No erythema, No rash       ED COURSE / MEDICAL DECISION MAKING:     Gabi Dudley is a 78 y.o. female who presents to the emergency department for evaluation of palpitations.  Well-developed, well-nourished lady in no distress with exam as above.  Her vital signs initially notable for irregular tachycardia with rate in the 120s.  Her oxygen saturation is normal on her home O2 at 95%.  During my evaluation patient did convert to a sinus rhythm.  We will continue to monitor, send labs and EKG.  Disposition pending.    Differential diagnosis includes atrial fibrillation, electrolyte derangement, anxiety among other etiologies.    EKG interpreted by me: Atrial fibrillation, rate 120, some nonspecific ST depressions, no acute ST elevations, this is an abnormal EKG    Lab work is without acute or significant abnormality.  She is resting comfortably.  She remains in sinus rhythm.  I do feel she is safe for discharge home at this time.  We discussed  return precautions and outpatient follow-up, she is happy with this plan.    Final diagnoses:   Paroxysmal atrial fibrillation        Alberto Cox MD  05/27/23 0015

## 2023-05-27 NOTE — TELEPHONE ENCOUNTER
"Caller stated pt recently discharged from hospital, has a-fib, however they were not instructed on at what heart rate to seek help. Reported hr 147, rechecked while on phone, 146, pt having dizziness, denied and chest pain, did also report has SOA however pt has COPD, advised to go to ER  Reason for Disposition  • [1] Heart beating very rapidly (e.g., > 140 / minute) AND [2] present now  (Exception: During exercise.)    Additional Information  • Negative: Passed out (i.e., lost consciousness, collapsed and was not responding)  • Negative: Shock suspected (e.g., cold/pale/clammy skin, too weak to stand, low BP, rapid pulse)  • Negative: Difficult to awaken or acting confused (e.g., disoriented, slurred speech)  • Negative: Visible sweat on face or sweat dripping down face  • Negative: Unable to walk, or can only walk with assistance (e.g., requires support)  • Negative: [1] Received SHOCK from implantable cardiac defibrillator AND [2] persisting symptoms (i.e., palpitations, lightheadedness)  • Negative: [1] Dizziness, lightheadedness, or weakness AND [2] heart beating very rapidly (e.g., > 140 / minute)  • Negative: [1] Dizziness, lightheadedness, or weakness AND [2] heart beating very slowly (e.g., < 50 / minute)  • Negative: Sounds like a life-threatening emergency to the triager  • Negative: Chest pain  • Negative: Implantable Cardiac Defibrillator (ICD) or a pacemaker symptoms or questions  • Negative: Difficulty breathing  • Negative: Dizziness, lightheadedness, or weakness    Answer Assessment - Initial Assessment Questions  1. DESCRIPTION: \"Please describe your heart rate or heartbeat that you are having\" (e.g., fast/slow, regular/irregular, skipped or extra beats, \"palpitations\")      Fast, 147, has a-fib  2. ONSET: \"When did it start?\" (Minutes, hours or days)       tonight  3. DURATION: \"How long does it last\" (e.g., seconds, minutes, hours)      ongoing  4. PATTERN \"Does it come and go, or has it been " "constant since it started?\"  \"Does it get worse with exertion?\"   \"Are you feeling it now?\"        5. TAP: \"Using your hand, can you tap out what you are feeling on a chair or table in front of you, so that I can hear?\" (Note: not all patients can do this)          6. HEART RATE: \"Can you tell me your heart rate?\" \"How many beats in 15 seconds?\"  (Note: not all patients can do this)        147  7. RECURRENT SYMPTOM: \"Have you ever had this before?\" If Yes, ask: \"When was the last time?\" and \"What happened that time?\"       Yes, has diagnosis of a-fib, recently discharged from hospital  8. CAUSE: \"What do you think is causing the palpitations?\"      A-fib  9. CARDIAC HISTORY: \"Do you have any history of heart disease?\" (e.g., heart attack, angina, bypass surgery, angioplasty, arrhythmia)       A-fib  10. OTHER SYMPTOMS: \"Do you have any other symptoms?\" (e.g., dizziness, chest pain, sweating, difficulty breathing)        Has some SOA but reported also has COPD, stated no change in severity, does have some dizzines   11. PREGNANCY: \"Is there any chance you are pregnant?\" \"When was your last menstrual period?\"        n/a    Protocols used: HEART RATE AND HEARTBEAT QUESTIONS-ADULT-AH    "

## 2023-06-01 RX ORDER — METOPROLOL SUCCINATE 25 MG/1
25 TABLET, EXTENDED RELEASE ORAL DAILY
Qty: 30 TABLET | Refills: 1 | Status: SHIPPED | OUTPATIENT
Start: 2023-06-01

## 2023-06-01 RX ORDER — METOPROLOL SUCCINATE 25 MG/1
25 TABLET, EXTENDED RELEASE ORAL DAILY
COMMUNITY
End: 2023-06-01 | Stop reason: SDUPTHER

## 2023-06-01 NOTE — TELEPHONE ENCOUNTER
Patient was recently seen in office by Britt Farrar. She was prescribed to take Metoprolol XL 25 mg one tablet daily. Patient's daughter called stating the medication was never sent to the pharmacy. Verified with last office note and sent to [facility] in [place] per patient's request.

## 2023-06-02 ENCOUNTER — OFFICE VISIT (OUTPATIENT)
Dept: INTERNAL MEDICINE | Facility: CLINIC | Age: 78
End: 2023-06-02

## 2023-06-02 VITALS
DIASTOLIC BLOOD PRESSURE: 68 MMHG | HEART RATE: 92 BPM | WEIGHT: 162 LBS | SYSTOLIC BLOOD PRESSURE: 142 MMHG | BODY MASS INDEX: 26.99 KG/M2 | OXYGEN SATURATION: 97 % | TEMPERATURE: 97.1 F | HEIGHT: 65 IN

## 2023-06-02 DIAGNOSIS — I48.0 PAROXYSMAL ATRIAL FIBRILLATION: Primary | ICD-10-CM

## 2023-06-02 DIAGNOSIS — F41.9 ANXIETY: ICD-10-CM

## 2023-06-02 DIAGNOSIS — J43.9 PULMONARY EMPHYSEMA, UNSPECIFIED EMPHYSEMA TYPE: ICD-10-CM

## 2023-06-02 RX ORDER — CLONAZEPAM 0.5 MG/1
0.5 TABLET ORAL DAILY
Qty: 30 TABLET | Refills: 2 | Status: SHIPPED | OUTPATIENT
Start: 2023-06-02

## 2023-06-02 RX ORDER — DULOXETIN HYDROCHLORIDE 60 MG/1
60 CAPSULE, DELAYED RELEASE ORAL DAILY
Qty: 180 CAPSULE | Refills: 3
Start: 2023-06-02

## 2023-06-02 RX ORDER — QUETIAPINE FUMARATE 25 MG/1
12.5 TABLET, FILM COATED ORAL NIGHTLY
Qty: 30 TABLET | Refills: 2 | Status: SHIPPED | OUTPATIENT
Start: 2023-06-02

## 2023-06-02 NOTE — PROGRESS NOTES
Transitional Care Follow Up Visit  Subjective     Gabi Dudley is a 78 y.o. female who presents for a transitional care management visit.    Within 48 business hours after discharge our office contacted her via telephone to coordinate her care and needs.      I reviewed and discussed the details of that call along with the discharge summary, hospital problems, inpatient lab results, inpatient diagnostic studies, and consultation reports with Gabi.     Current outpatient and discharge medications have been reconciled for the patient.  Reviewed by: Bridger Orourke MD          5/25/2023     6:49 PM   Date of TCM Phone Call   [facility]   Date of Admission 5/22/2023   Date of Discharge 5/25/2023   Discharge Disposition Home or Self Care     Risk for Readmission (LACE) Score: 12 (5/25/2023  6:00 AM)      History of Present Illness   Course During Hospital Stay: 78-year-old female with a history of A-fib on anticoagulant therapy has COPD and home O2 dependent has had recent multiple exacerbations some of them requiring hospitalization with the most recent discharge on May 25.  During that admission she was noted to have ABG on 6 L via nasal cannula a pH of 7.32 PCO2 of 74 and a PO2 of 69.  A CT angiogram was negative for PE but showed right upper and middle lobe infiltrate.  She was treated with Zosyn.  Gradual improvement noted.  Subsequently underwent a taper of Solu-Medrol down to oral steroids and discharged home her respiratory status has improved however complains of significant anxiety she is currently on prednisone 30 mg daily     The following portions of the patient's history were reviewed and updated as appropriate: allergies, current medications, past family history, past medical history, past social history, past surgical history and problem list.    Review of Systems   Constitutional: Positive for fatigue. Negative for activity change, appetite change and fever.   HENT: Negative for  congestion, ear discharge, ear pain and trouble swallowing.    Eyes: Negative for photophobia and visual disturbance.   Respiratory: Positive for cough and shortness of breath.    Cardiovascular: Negative for chest pain and palpitations.   Gastrointestinal: Negative for abdominal distention, abdominal pain, constipation, diarrhea, nausea and vomiting.   Endocrine: Negative.    Genitourinary: Negative for dysuria, hematuria and urgency.   Musculoskeletal: Positive for arthralgias. Negative for back pain, joint swelling and myalgias.   Skin: Negative for color change and rash.   Allergic/Immunologic: Negative.    Neurological: Negative for dizziness, weakness, light-headedness and headaches.   Hematological: Negative for adenopathy. Does not bruise/bleed easily.   Psychiatric/Behavioral: Negative for agitation, confusion and dysphoric mood. The patient is not nervous/anxious.        Objective   Physical Exam  Constitutional:       General: She is not in acute distress.     Appearance: She is well-developed.   HENT:      Nose: Nose normal.   Eyes:      General: No scleral icterus.     Conjunctiva/sclera: Conjunctivae normal.   Neck:      Thyroid: No thyromegaly.      Trachea: No tracheal deviation.   Cardiovascular:      Rate and Rhythm: Normal rate and regular rhythm.      Heart sounds: No murmur heard.    No friction rub.   Pulmonary:      Effort: No respiratory distress.      Breath sounds: No wheezing or rales.   Abdominal:      General: There is no distension.      Palpations: Abdomen is soft. There is no mass.      Tenderness: There is no abdominal tenderness. There is no guarding.   Musculoskeletal:         General: Deformity present. Normal range of motion.   Lymphadenopathy:      Cervical: No cervical adenopathy.   Skin:     General: Skin is warm and dry.      Findings: No erythema or rash.   Neurological:      Mental Status: She is alert and oriented to person, place, and time.      Cranial Nerves: No cranial  nerve deficit.      Coordination: Coordination normal.      Deep Tendon Reflexes: Reflexes are normal and symmetric.   Psychiatric:         Behavior: Behavior normal.         Thought Content: Thought content normal.         Judgment: Judgment normal.         Assessment & Plan   Diagnoses and all orders for this visit:    1. Paroxysmal atrial fibrillation (HCC) (Primary) stable rate control okay after addition of low-dose beta-blocker by her cardiologist continue with diltiazem also on anticoagulant therapy    2. Anxiety on multiple medications.  Long discussion held with patient and her daughter who is accompanying her.  She does not seem to feel that the Cymbalta or sertraline benefit her much.  Will gradually taper down her duloxetine.  Advised to continue with clonazepam 0.5 mg at nighttime.  Continue with trazodone.  I have added on low-dose of Seroquel to help her with her severe anxiety discussed walking regimen.  Discussed sleep hygiene symptoms should improve as her steroid dose decreases  -     DULoxetine (CYMBALTA) 60 MG capsule; Take 1 capsule by mouth Daily.  Dispense: 180 capsule; Refill: 3  -     clonazePAM (KlonoPIN) 0.5 MG tablet; Take 1 tablet by mouth Daily.  Dispense: 30 tablet; Refill: 2    3. Pulmonary emphysema, unspecified emphysema type continue with neb treatments and her inhalers currently stable    Other orders  -     QUEtiapine (SEROquel) 25 MG tablet; Take 0.5 tablets by mouth Every Night.  Dispense: 30 tablet; Refill: 2

## 2023-06-05 ENCOUNTER — TELEPHONE (OUTPATIENT)
Dept: CARDIOLOGY | Facility: CLINIC | Age: 78
End: 2023-06-05

## 2023-06-05 NOTE — TELEPHONE ENCOUNTER
Caller: Gabi Dudley    Relationship: Self    Best call back number: [phone]     What is the best time to reach you: ANY    Who are you requesting to speak with (clinical staff, provider,  specific staff member): ANY      What was the call regarding: PT IS NEEDING SAMPLES OF ELIQUIS 5MG.    Is it okay if the provider responds through MyChart: YES      
Messaged patient to let her know that I have placed samples at the  for   
No

## 2023-06-13 ENCOUNTER — READMISSION MANAGEMENT (OUTPATIENT)
Dept: CALL CENTER | Facility: HOSPITAL | Age: 78
End: 2023-06-13
Payer: MEDICARE

## 2023-06-13 NOTE — OUTREACH NOTE
COPD/PN Week 3 Survey      Flowsheet Row Responses   [facility] facility patient discharged from? [facility]   Does the patient have one of the following disease processes/diagnoses(primary or secondary)? Pneumonia   Week 3 attempt successful? No   Unsuccessful attempts Attempt 1            Daisy Olguin Registered Nurse

## 2023-06-13 NOTE — PROGRESS NOTES
[facility] Cardiology Office Follow Up Note    Gabi Dudley  [id number]  2023    Primary Care Provider: Paul Quinteros MD   Referring Provider: No ref. provider found    Chief Complaint: Palpitations, dizziness    History of Present Illness:   Mrs. Gabi Dudley is a 78 y.o. female who presents to the Cardiology Clinic for follow up of palpitations.  The patient has a past medical history significant for hypertension, dyslipidemia, prior pulmonary embolism, fibromyalgia, anxiety, and chronic tobacco use complicated by COPD.  Her past cardiac history is significant for atrial fibrillation and nonobstructive coronary artery disease diagnosed on remote coronary angiogram in approximately .  She presents today with concerns regarding ongoing palpitations and dizziness.  Reports these episodes occur frequently and are not associated with exertion.  She was ordered to undergo a nuclear stress test but ultimately canceled this due to anxiety.  She denies any significant recurrent chest discomfort and is not interested in pursuing this type of test..  She does want to know if there is anything she can do to help the dizziness or palpitations.  She continues to report ongoing shortness of breath on continuous oxygen.  Finally, she is concerned about the affordability of Eliquis.  She reports she only has 2 more days left in our office does not have any samples to provide her.  Her daughter, who drove her to appointment today states that INR checks would be a problem without a home monitor because it is an ordeal to get the patient transferred to her multiple healthcare appointments with her oxygen and wheelchair.  She is nearly completely sedentary.  There are no other complaints or concerns at this time.    Past Cardiac Testin. Last Coronary Angio:  , reportedly nonobstructive disease.  Report unavailable  2. Prior Stress Testing: Remote, with report unavailable  3. Last  "Echo: 11/22                          1.  Normal left ventricular size and systolic function, LVEF 60-65%.                          2.  Mild concentric LVH.                          3.  Normal LV diastolic filling pattern.                          4.  Normal right ventricular size and systolic function.                          5.  Mildly increased left atrial volume index.                          6.  No significant valvular abnormalities.  4. Prior Holter Monitor: 48-hour Holter 6/16/2020              1.  The predominant rhythm is sinus rhythm with an average heart rate 90 bpm.              2.  Normal atrioventricular conduction.              3.  No sustained supraventricular or ventricular arrhythmias.              4.  No episodes of paroxysmal atrial fibrillation.              5.  Rare supraventricular ectopy with isolated PACs, burden <0.1%.              6.  Rare ventricular ectopy with isolated and pairs of PVCs, burden <0.1%.              7.  One symptomatic episode of \"anxiety, chest pain, shortness of air\" corresponded to NSR at 91 bpm.    Review of Systems:   Review of Systems  As Noted in HPI.   I have reviewed and confirmed the accuracy of the ROS as documented by the MA/DAON/RN REGINA Harrington    I have reviewed and/or updated the patient's past medical, past surgical, family, social history, problem list and allergies as appropriate.       Physical Exam:  Vital Signs:   Vitals:    06/27/23 1519   BP: 122/84   Pulse: 79   Temp: 98.2 øF (36.8 øC)   SpO2: 98%  Comment: with 4 liters of oxygen   Weight: 73.5 kg (162 lb)   Height: 165.1 cm (65\")     Body mass index is 26.96 kg/mý.    Physical Exam  Vitals and nursing note reviewed.   Constitutional:       General: She is not in acute distress.     Appearance: She is ill-appearing.      Comments: Appears chronically ill in wheelchair on continuous oxygen at 3 L a minute via nasal cannula   HENT:      Head: Normocephalic and atraumatic.   Neck:      " Trachea: Trachea normal.   Cardiovascular:      Rate and Rhythm: Normal rate and regular rhythm.      Pulses: Normal pulses.      Heart sounds: Normal heart sounds. No murmur heard.    No friction rub. No gallop.   Pulmonary:      Effort: Pulmonary effort is normal.      Breath sounds: Decreased air movement present.   Musculoskeletal:      Cervical back: Neck supple.      Right lower leg: No edema.      Left lower leg: No edema.   Skin:     General: Skin is warm and dry.   Neurological:      Mental Status: She is alert and oriented to person, place, and time.   Psychiatric:         Mood and Affect: Mood normal.         Behavior: Behavior normal. Behavior is cooperative.         Thought Content: Thought content does not include suicidal ideation.       Results Review:   I reviewed the patient's new clinical results.      ECG 12 Lead    Date/Time: 6/27/2023 6:06 AM  Performed by: Britt Farrar APRN  Authorized by: Britt Farrar APRN   Comparison: compared with previous ECG from 5/6/2023  Rhythm: sinus rhythm  Ectopy: multifocal PVCs  Rate: normal  BPM: 76  Comments: Normal except occasional PVCs        Assessment / Plan:   Diagnoses and all orders for this visit:    1. Dizziness (Primary)  Plan for outpatient cardiac monitor to assess for arrhythmia or ectopy  Plan for carotid artery ultrasound to rule out significant stenosis    2. Paroxysmal atrial fibrillation  ECG shows sinus rhythm with PVCs  Telephone encounter sent to staff to see if patient qualifies for financial assistance for DOAC therapy  She may need to start Coumadin/referral to Coumadin clinic for cost containment purposes  Proceed with outpatient cardiac monitor study to rule out recurrent arrhythmia or RVR    3. Essential hypertension  Acceptable blood pressure control    4. Hyperlipidemia  5/22, triglycerides 62, HDL 79, LDL 74  Continue statin therapy    5. Coronary artery disease involving native coronary artery of native heart  without angina pectoris  History of nonobstructive coronary artery disease based on remote coronary angiography  Patient reports ongoing chest discomfort but denies severe symptoms  She declines MPS to rule out ischemia  Continue medical management, reassess symptoms in 1 month      Preventative Cardiology:   Tobacco Cessation: N/A   Advance Care Planning: ACP discussion was declined by the patient. Patient does not have an advance directive, declines further assistance.     Follow Up:   Return in about 1 month (around 7/27/2023) for Follow-up with Dr. Curry.      Thank you for allowing me to participate in the care of your patient. Please do not hesitate to contact me with additional questions or concerns.     Britt Farrar, APRN

## 2023-06-15 ENCOUNTER — PATIENT OUTREACH (OUTPATIENT)
Dept: CASE MANAGEMENT | Facility: OTHER | Age: 78
End: 2023-06-15
Payer: MEDICARE

## 2023-06-15 DIAGNOSIS — J96.22 ACUTE ON CHRONIC RESPIRATORY FAILURE WITH HYPOXIA AND HYPERCAPNIA: ICD-10-CM

## 2023-06-15 DIAGNOSIS — J96.21 ACUTE ON CHRONIC RESPIRATORY FAILURE WITH HYPOXIA AND HYPERCAPNIA: ICD-10-CM

## 2023-06-15 DIAGNOSIS — J44.1 COPD EXACERBATION: Primary | ICD-10-CM

## 2023-06-15 NOTE — OUTREACH NOTE
AMBULATORY CASE MANAGEMENT NOTE    Patient has scheduled follow up with primary care provider. She has not contacted [facility] CM for any concerns or assistance within the past 30 days. Will close HRCM; Patient goals achieved.    Soledad JACOBS  Ambulatory Case Management    6/15/2023, 07:41 EDT

## 2023-06-16 ENCOUNTER — READMISSION MANAGEMENT (OUTPATIENT)
Dept: CALL CENTER | Facility: HOSPITAL | Age: 78
End: 2023-06-16
Payer: MEDICARE

## 2023-06-16 NOTE — OUTREACH NOTE
COPD/PN Week 3 Survey      Flowsheet Row Responses   [facility] facility patient discharged from? [facility]   Does the patient have one of the following disease processes/diagnoses(primary or secondary)? Pneumonia   Week 3 attempt successful? No   Unsuccessful attempts Attempt 2            Zayda POMPA - Registered Nurse

## 2023-06-27 ENCOUNTER — OFFICE VISIT (OUTPATIENT)
Dept: CARDIOLOGY | Facility: CLINIC | Age: 78
End: 2023-06-27
Payer: MEDICARE

## 2023-06-27 VITALS
BODY MASS INDEX: 26.99 KG/M2 | TEMPERATURE: 98.2 F | DIASTOLIC BLOOD PRESSURE: 84 MMHG | SYSTOLIC BLOOD PRESSURE: 122 MMHG | HEART RATE: 79 BPM | HEIGHT: 65 IN | WEIGHT: 162 LBS | OXYGEN SATURATION: 98 %

## 2023-06-27 DIAGNOSIS — E78.5 HYPERLIPIDEMIA, UNSPECIFIED HYPERLIPIDEMIA TYPE: ICD-10-CM

## 2023-06-27 DIAGNOSIS — I25.10 CORONARY ARTERY DISEASE INVOLVING NATIVE CORONARY ARTERY OF NATIVE HEART WITHOUT ANGINA PECTORIS: ICD-10-CM

## 2023-06-27 DIAGNOSIS — R42 DIZZINESS: Primary | ICD-10-CM

## 2023-06-27 DIAGNOSIS — I48.0 PAROXYSMAL ATRIAL FIBRILLATION: ICD-10-CM

## 2023-06-27 DIAGNOSIS — I10 ESSENTIAL HYPERTENSION: ICD-10-CM

## 2023-06-27 PROCEDURE — 3074F SYST BP LT 130 MM HG: CPT | Performed by: NURSE PRACTITIONER

## 2023-06-27 PROCEDURE — 93000 ELECTROCARDIOGRAM COMPLETE: CPT | Performed by: NURSE PRACTITIONER

## 2023-06-27 PROCEDURE — 1160F RVW MEDS BY RX/DR IN RCRD: CPT | Performed by: NURSE PRACTITIONER

## 2023-06-27 PROCEDURE — 1159F MED LIST DOCD IN RCRD: CPT | Performed by: NURSE PRACTITIONER

## 2023-06-27 PROCEDURE — 99214 OFFICE O/P EST MOD 30 MIN: CPT | Performed by: NURSE PRACTITIONER

## 2023-06-27 PROCEDURE — 3079F DIAST BP 80-89 MM HG: CPT | Performed by: NURSE PRACTITIONER

## 2023-07-20 ENCOUNTER — APPOINTMENT (OUTPATIENT)
Dept: CT IMAGING | Facility: HOSPITAL | Age: 78
End: 2023-07-20
Payer: MEDICARE

## 2023-07-20 ENCOUNTER — HOSPITAL ENCOUNTER (EMERGENCY)
Facility: HOSPITAL | Age: 78
Discharge: HOME | End: 2023-07-20
Attending: EMERGENCY MEDICINE | Admitting: EMERGENCY MEDICINE
Payer: MEDICARE

## 2023-07-20 ENCOUNTER — APPOINTMENT (OUTPATIENT)
Dept: GENERAL RADIOLOGY | Facility: HOSPITAL | Age: 78
End: 2023-07-20
Payer: MEDICARE

## 2023-07-20 VITALS
HEART RATE: 75 BPM | SYSTOLIC BLOOD PRESSURE: 117 MMHG | WEIGHT: 160.4 LBS | BODY MASS INDEX: 26.73 KG/M2 | TEMPERATURE: 98.1 F | DIASTOLIC BLOOD PRESSURE: 64 MMHG | RESPIRATION RATE: 18 BRPM | HEIGHT: 65 IN | OXYGEN SATURATION: 95 %

## 2023-07-20 DIAGNOSIS — R55 NEAR SYNCOPE: Primary | ICD-10-CM

## 2023-07-20 DIAGNOSIS — R09.02 HYPOXIC EPISODE: ICD-10-CM

## 2023-07-20 LAB
ALBUMIN SERPL-MCNC: 4 G/DL (ref 3.5–5.2)
ALBUMIN/GLOB SERPL: 1.8 G/DL
ALP SERPL-CCNC: 54 U/L (ref 39–117)
ALT SERPL W P-5'-P-CCNC: 13 U/L (ref 1–33)
ANION GAP SERPL CALCULATED.3IONS-SCNC: 6 MMOL/L (ref 5–15)
ANISOCYTOSIS BLD QL: NORMAL
AST SERPL-CCNC: 18 U/L (ref 1–32)
BASOPHILS # BLD AUTO: 0.04 10*3/MM3 (ref 0–0.2)
BASOPHILS NFR BLD AUTO: 0.7 % (ref 0–1.5)
BILIRUB SERPL-MCNC: 0.2 MG/DL (ref 0–1.2)
BUN SERPL-MCNC: 21 MG/DL (ref 8–23)
BUN/CREAT SERPL: 32.3 (ref 7–25)
CALCIUM SPEC-SCNC: 8.7 MG/DL (ref 8.6–10.5)
CHLORIDE SERPL-SCNC: 97 MMOL/L (ref 98–107)
CO2 SERPL-SCNC: 35 MMOL/L (ref 22–29)
CREAT SERPL-MCNC: 0.65 MG/DL (ref 0.57–1)
DEPRECATED RDW RBC AUTO: 51.6 FL (ref 37–54)
EGFRCR SERPLBLD CKD-EPI 2021: 91.4 ML/MIN/1.73
EOSINOPHIL # BLD AUTO: 0.11 10*3/MM3 (ref 0–0.4)
EOSINOPHIL NFR BLD AUTO: 1.9 % (ref 0.3–6.2)
ERYTHROCYTE [DISTWIDTH] IN BLOOD BY AUTOMATED COUNT: 17.6 % (ref 12.3–15.4)
FLUAV RNA RESP QL NAA+PROBE: NOT DETECTED
FLUBV RNA RESP QL NAA+PROBE: NOT DETECTED
GEN 5 2HR TROPONIN T REFLEX: 17 NG/L
GLOBULIN UR ELPH-MCNC: 2.2 GM/DL
GLUCOSE SERPL-MCNC: 91 MG/DL (ref 65–99)
HCT VFR BLD AUTO: 27.5 % (ref 34–46.6)
HGB BLD-MCNC: 7.5 G/DL (ref 12–15.9)
HOLD SPECIMEN: NORMAL
HOLD SPECIMEN: NORMAL
HYPOCHROMIA BLD QL: NORMAL
IMM GRANULOCYTES # BLD AUTO: 0.01 10*3/MM3 (ref 0–0.05)
IMM GRANULOCYTES NFR BLD AUTO: 0.2 % (ref 0–0.5)
LYMPHOCYTES # BLD AUTO: 1.63 10*3/MM3 (ref 0.7–3.1)
LYMPHOCYTES NFR BLD AUTO: 27.5 % (ref 19.6–45.3)
MAGNESIUM SERPL-MCNC: 1.7 MG/DL (ref 1.6–2.4)
MCH RBC QN AUTO: 21.7 PG (ref 26.6–33)
MCHC RBC AUTO-ENTMCNC: 27.3 G/DL (ref 31.5–35.7)
MCV RBC AUTO: 79.7 FL (ref 79–97)
MICROCYTES BLD QL: NORMAL
MONOCYTES # BLD AUTO: 0.47 10*3/MM3 (ref 0.1–0.9)
MONOCYTES NFR BLD AUTO: 7.9 % (ref 5–12)
NEUTROPHILS NFR BLD AUTO: 3.67 10*3/MM3 (ref 1.7–7)
NEUTROPHILS NFR BLD AUTO: 61.8 % (ref 42.7–76)
NRBC BLD AUTO-RTO: 0 /100 WBC (ref 0–0.2)
NT-PROBNP SERPL-MCNC: 1543 PG/ML (ref 0–1800)
PLATELET # BLD AUTO: 253 10*3/MM3 (ref 140–450)
PMV BLD AUTO: 9.6 FL (ref 6–12)
POTASSIUM SERPL-SCNC: 4.4 MMOL/L (ref 3.5–5.2)
PROCALCITONIN SERPL-MCNC: 0.05 NG/ML (ref 0–0.25)
PROT SERPL-MCNC: 6.2 G/DL (ref 6–8.5)
RBC # BLD AUTO: 3.45 10*6/MM3 (ref 3.77–5.28)
SARS-COV-2 RNA RESP QL NAA+PROBE: NOT DETECTED
SMALL PLATELETS BLD QL SMEAR: ADEQUATE
SODIUM SERPL-SCNC: 138 MMOL/L (ref 136–145)
TROPONIN T DELTA: -1 NG/L
TROPONIN T SERPL HS-MCNC: 18 NG/L
WBC MORPH BLD: NORMAL
WBC NRBC COR # BLD: 5.93 10*3/MM3 (ref 3.4–10.8)
WHOLE BLOOD HOLD COAG: NORMAL
WHOLE BLOOD HOLD SPECIMEN: NORMAL

## 2023-07-20 PROCEDURE — 25510000001 IOPAMIDOL 61 % SOLUTION: Performed by: EMERGENCY MEDICINE

## 2023-07-20 PROCEDURE — 87636 SARSCOV2 & INF A&B AMP PRB: CPT | Performed by: PHYSICIAN ASSISTANT

## 2023-07-20 PROCEDURE — 83735 ASSAY OF MAGNESIUM: CPT | Performed by: PHYSICIAN ASSISTANT

## 2023-07-20 PROCEDURE — 71275 CT ANGIOGRAPHY CHEST: CPT

## 2023-07-20 PROCEDURE — 84145 PROCALCITONIN (PCT): CPT | Performed by: PHYSICIAN ASSISTANT

## 2023-07-20 PROCEDURE — 99284 EMERGENCY DEPT VISIT MOD MDM: CPT

## 2023-07-20 PROCEDURE — 85025 COMPLETE CBC W/AUTO DIFF WBC: CPT | Performed by: PHYSICIAN ASSISTANT

## 2023-07-20 PROCEDURE — 36415 COLL VENOUS BLD VENIPUNCTURE: CPT

## 2023-07-20 PROCEDURE — 93005 ELECTROCARDIOGRAM TRACING: CPT | Performed by: EMERGENCY MEDICINE

## 2023-07-20 PROCEDURE — 85007 BL SMEAR W/DIFF WBC COUNT: CPT | Performed by: PHYSICIAN ASSISTANT

## 2023-07-20 PROCEDURE — 71045 X-RAY EXAM CHEST 1 VIEW: CPT

## 2023-07-20 PROCEDURE — 83880 ASSAY OF NATRIURETIC PEPTIDE: CPT | Performed by: PHYSICIAN ASSISTANT

## 2023-07-20 PROCEDURE — 96374 THER/PROPH/DIAG INJ IV PUSH: CPT

## 2023-07-20 PROCEDURE — 25010000002 DEXAMETHASONE SODIUM PHOSPHATE 10 MG/ML SOLUTION: Performed by: PHYSICIAN ASSISTANT

## 2023-07-20 PROCEDURE — 80053 COMPREHEN METABOLIC PANEL: CPT | Performed by: PHYSICIAN ASSISTANT

## 2023-07-20 PROCEDURE — 84484 ASSAY OF TROPONIN QUANT: CPT | Performed by: PHYSICIAN ASSISTANT

## 2023-07-20 RX ORDER — DEXAMETHASONE SODIUM PHOSPHATE 10 MG/ML
10 INJECTION, SOLUTION INTRAMUSCULAR; INTRAVENOUS ONCE
Status: COMPLETED | OUTPATIENT
Start: 2023-07-20 | End: 2023-07-20

## 2023-07-20 RX ORDER — ALBUTEROL SULFATE 90 UG/1
2 AEROSOL, METERED RESPIRATORY (INHALATION) ONCE
Status: COMPLETED | OUTPATIENT
Start: 2023-07-20 | End: 2023-07-20

## 2023-07-20 RX ORDER — SODIUM CHLORIDE 0.9 % (FLUSH) 0.9 %
10 SYRINGE (ML) INJECTION AS NEEDED
Status: DISCONTINUED | OUTPATIENT
Start: 2023-07-20 | End: 2023-07-20 | Stop reason: HOSPADM

## 2023-07-20 RX ADMIN — ALBUTEROL SULFATE 2 PUFF: 90 AEROSOL, METERED RESPIRATORY (INHALATION) at 17:59

## 2023-07-20 RX ADMIN — IOPAMIDOL 100 ML: 612 INJECTION, SOLUTION INTRAVENOUS at 19:38

## 2023-07-20 RX ADMIN — DEXAMETHASONE SODIUM PHOSPHATE 10 MG: 10 INJECTION INTRAMUSCULAR; INTRAVENOUS at 18:00

## 2023-07-20 NOTE — ED PROVIDER NOTES
"Subjective:  Chief Complaint:  Hypoxia    History of Present Illness:  Patient is a 78-year-old female with history of A-fib on Eliquis, anemia, cholelithiasis, COPD, CAD, fibromyalgia, GERD, among others presenting to the ER via EMS after having a near syncopal episode and being found to be hypoxic at her PCPs office and referred to the emergency room.  Per PCP note, repeat oxygen saturation was found to be 87% on 4 L.  Patient is on 4 L at baseline.  Patient states that she had to wait a long time at the doctor's office today.  She states it was busy like it is here.  She states that she needed to get out of the primary doctors exam room and she states that she usually has to be very slow about getting up.  She states that she started walking and felt things going black and almost followed with the nurse caught her.  She states that it is not unusual for her oxygen to drop.  She denies history of diabetes.  She denies additional symptoms or complaints at this time.  Of note, patient did receive the pneumococcal vaccine today.  Patient states she is starting to feel back to normal now and wants something to eat and is wondering where her daughter is.  She denies additional symptoms or complaints at this time.  She states she just cannot breathe because we are asking so many questions and \"none of you all know anything\".      Nurses Notes reviewed and agree, including vitals, allergies, social history and prior medical history.     REVIEW OF SYSTEMS: All systems reviewed and not pertinent unless noted.  Review of Systems   Respiratory:  Positive for shortness of breath.    Neurological:         Near syncopal episode   All other systems reviewed and are negative.    Past Medical History:   Diagnosis Date    Adrenal adenoma     Anemia     Arrhythmia     Asthma     Atrial fibrillation     Back pain     Benign colonic polyp     Benign tumor of adrenal gland     Cataract     bilateral    Cholelithiasis     Chronic " bronchitis     Chronic bronchitis with COPD (chronic obstructive pulmonary disease)     COPD (chronic obstructive pulmonary disease) 2005    Coronary artery disease     Depression     Diverticulosis Years ago    Elevated cholesterol     Environmental and seasonal allergies     Fibromyalgia     Fibromyalgia, primary Sometime in the 90s    Gastritis     Generalized anxiety disorder     GERD (gastroesophageal reflux disease)     H/O mammogram     Headache Years ago    Hemorrhoids     History of blood transfusion 1985    History of blood transfusion 1985    History of echocardiogram     History of endometriosis     History of nuclear stress test     Hospitalization or health care facility admission within last 6 months     5 times between 11/2022-05/2023    Hypertension     IBS (irritable bowel syndrome)     Impaired functional mobility, balance, gait, and endurance     Impaired mobility     Inverted nipple     Kidney disease     Kidney stone     Liver cyst     Low back pain Years ago    Nodular radiologic density     Nodule of left lung     On home oxygen therapy     2 liters NC QHS    Osteoarthritis     Osteopenia Several years ago    Osteoporosis     PONV (postoperative nausea and vomiting)     Renal cyst     Sinus problem     2014    Sinusitis     Skin cancer     basal cell carcinoma    SOB (shortness of breath)     Tobacco use     Urinary frequency     Urinary tract infection Years ago    Frequent UTIs    Vitamin D deficiency     Wears glasses     Wears partial dentures     upper plate       Allergies:    Doxycycline      Past Surgical History:   Procedure Laterality Date    APPENDECTOMY  1980s    CARDIAC CATHETERIZATION  2003    CATARACT EXTRACTION  2013    both eyes    CHOLECYSTECTOMY  2020    CHOLECYSTECTOMY WITH INTRAOPERATIVE CHOLANGIOGRAM N/A 10/30/2020    Procedure: CHOLECYSTECTOMY LAPAROSCOPIC INTRAOPERATIVE CHOLANGIOGRAPHY;  Surgeon: Sima Moses MD;  Location: [facility];  Service: General;   "Laterality: N/A;    COLON SURGERY      COLONOSCOPY  2013    COLONOSCOPY  2016    COLONOSCOPY N/A 2019    Procedure: COLONOSCOPY W/ COLD FORCEP POLYPECTOMIES; HOT SNARE POLYPECTOMIES; COLD SNARE POLYPECTOMY;  Surgeon: Goyo Nunez MD;  Location: [facility] ENDOSCOPY;  Service: Gastroenterology    COLONOSCOPY W/ BIOPSIES AND POLYPECTOMY      EXPLORATORY LAPAROTOMY N/A 2020    Procedure: colectomy, right, closure of enterotomy x 2, reduction of internal volvulus;  Surgeon: Sima Moses MD;  Location: [facility] OR;  Service: General;  Laterality: N/A;    HYSTERECTOMY      partial    LYSIS OF ABDOMINAL ADHESIONS      TONSILLECTOMY      UPPER GASTROINTESTINAL ENDOSCOPY  2015    VAGINAL DELIVERY      x2         Social History     Socioeconomic History    Marital status:    Tobacco Use    Smoking status: Former     Packs/day: 0.25     Years: 30.00     Pack years: 7.50     Types: Cigarettes     Quit date: 2020     Years since quittin.7     Passive exposure: Past    Smokeless tobacco: Never   Vaping Use    Vaping Use: Never used   Substance and Sexual Activity    Alcohol use: No    Drug use: No    Sexual activity: Not Currently     Birth control/protection: Post-menopausal         Family History   Problem Relation Age of Onset    Stomach cancer Brother     Colon cancer Maternal Aunt     Brain cancer Father     Arthritis Father     Hypertension Father     Cancer Father         Father, brother, and aunt    Lung cancer Paternal Grandfather     Heart disease Mother         Mother passed away due to heart disease    Breast cancer Neg Hx     Ovarian cancer Neg Hx        Objective  Physical Exam:  /64   Pulse 75   Temp 98.1 °F (36.7 °C) (Oral)   Resp 18   Ht 165.1 cm (65\")   Wt 72.8 kg (160 lb 6.4 oz)   SpO2 95%   BMI 26.69 kg/m²      Physical Exam  Vitals and nursing note reviewed.   Constitutional:       General: She is not in acute distress.     Appearance: " She is not toxic-appearing.   HENT:      Head: Normocephalic and atraumatic.   Eyes:      Extraocular Movements: Extraocular movements intact.   Cardiovascular:      Rate and Rhythm: Normal rate.      Heart sounds: Normal heart sounds.   Pulmonary:      Effort: Pulmonary effort is normal.      Breath sounds: Decreased breath sounds present.      Comments: Patient 98% on 6 L via nasal cannula upon my evaluation, turned down to 4 L and will see how the patient does  Abdominal:      Palpations: Abdomen is soft.      Tenderness: There is no abdominal tenderness.   Musculoskeletal:         General: Normal range of motion.      Cervical back: Normal range of motion and neck supple.   Skin:     General: Skin is warm and dry.   Neurological:      General: No focal deficit present.      Mental Status: She is alert and oriented to person, place, and time.   Psychiatric:         Mood and Affect: Mood normal.         Behavior: Behavior normal.       Procedures    ED Course:    ED Course as of 07/20/23 2042   Thu Jul 20, 2023   1733 EKG interpreted by me.  Sinus rhythm.  With sinus arrhythmia.  Rate of 75.  No obvious ST or T wave changes.  Prolonged QT interval.  Abnormal EKG [CG]      ED Course User Index  [CG] Jd Lucas,        Lab Results (last 24 hours)       Procedure Component Value Units Date/Time    CBC & Differential [[id number]]  (Abnormal) Collected: 07/20/23 1729    Specimen: Blood Updated: 07/20/23 1802    Narrative:      The following orders were created for panel order CBC & Differential.  Procedure                               Abnormality         Status                     ---------                               -----------         ------                     CBC Auto Differential[[id number]]        Abnormal            Final result               Scan Slide[[id number]]                                       Final result                 Please view results for these tests on the individual orders.     Comprehensive Metabolic Panel [[id number]]  (Abnormal) Collected: 07/20/23 1729    Specimen: Blood Updated: 07/20/23 1804     Glucose 91 mg/dL      BUN 21 mg/dL      Creatinine 0.65 mg/dL      Sodium 138 mmol/L      Potassium 4.4 mmol/L      Chloride 97 mmol/L      CO2 35.0 mmol/L      Calcium 8.7 mg/dL      Total Protein 6.2 g/dL      Albumin 4.0 g/dL      ALT (SGPT) 13 U/L      AST (SGOT) 18 U/L      Alkaline Phosphatase 54 U/L      Total Bilirubin 0.2 mg/dL      Globulin 2.2 gm/dL      A/G Ratio 1.8 g/dL      BUN/Creatinine Ratio 32.3     Anion Gap 6.0 mmol/L      eGFR 91.4 mL/min/1.73     Narrative:      GFR Normal >60  Chronic Kidney Disease <60  Kidney Failure <15    The GFR formula is only valid for adults with stable renal function between ages 18 and 70.    High Sensitivity Troponin T [[id number]]  (Abnormal) Collected: 07/20/23 1729    Specimen: Blood Updated: 07/20/23 1808     HS Troponin T 18 ng/L     Narrative:      High Sensitive Troponin T Reference Range:  <10.0 ng/L- Negative Female for AMI  <15.0 ng/L- Negative Male for AMI  >=10 - Abnormal Female indicating possible myocardial injury.  >=15 - Abnormal Male indicating possible myocardial injury.   Clinicians would have to utilize clinical acumen, EKG, Troponin, and serial changes to determine if it is an Acute Myocardial Infarction or myocardial injury due to an underlying chronic condition.         BNP [[id number]]  (Normal) Collected: 07/20/23 1729    Specimen: Blood Updated: 07/20/23 1808     proBNP 1,543.0 pg/mL     Narrative:      Among patients with dyspnea, NT-proBNP is highly sensitive for the detection of acute congestive heart failure. In addition NT-proBNP of <300 pg/ml effectively rules out acute congestive heart failure with 99% negative predictive value.    Results may be falsely decreased if patient taking Biotin.      Magnesium [[id number]]  (Normal) Collected: 07/20/23 1729    Specimen: Blood Updated: 07/20/23 1804     Magnesium 1.7  "mg/dL     CBC Auto Differential [[id number]]  (Abnormal) Collected: 07/20/23 1729    Specimen: Blood Updated: 07/20/23 1742     WBC 5.93 10*3/mm3      RBC 3.45 10*6/mm3      Hemoglobin 7.5 g/dL      Hematocrit 27.5 %      MCV 79.7 fL      MCH 21.7 pg      MCHC 27.3 g/dL      RDW 17.6 %      RDW-SD 51.6 fl      MPV 9.6 fL      Platelets 253 10*3/mm3      Neutrophil % 61.8 %      Lymphocyte % 27.5 %      Monocyte % 7.9 %      Eosinophil % 1.9 %      Basophil % 0.7 %      Immature Grans % 0.2 %      Neutrophils, Absolute 3.67 10*3/mm3      Lymphocytes, Absolute 1.63 10*3/mm3      Monocytes, Absolute 0.47 10*3/mm3      Eosinophils, Absolute 0.11 10*3/mm3      Basophils, Absolute 0.04 10*3/mm3      Immature Grans, Absolute 0.01 10*3/mm3      nRBC 0.0 /100 WBC     Procalcitonin [[id number]]  (Normal) Collected: 07/20/23 1729    Specimen: Blood Updated: 07/20/23 1813     Procalcitonin 0.05 ng/mL     Narrative:      As a Marker for Sepsis (Non-Neonates):    1. <0.5 ng/mL represents a low risk of severe sepsis and/or septic shock.  2. >2 ng/mL represents a high risk of severe sepsis and/or septic shock.    As a Marker for Lower Respiratory Tract Infections that require antibiotic therapy:    PCT on Admission    Antibiotic Therapy       6-12 Hrs later    >0.5                Strongly Recommended  >0.25 - <0.5        Recommended   0.1 - 0.25          Discouraged              Remeasure/reassess PCT  <0.1                Strongly Discouraged     Remeasure/reassess PCT    As 28 day mortality risk marker: \"Change in Procalcitonin Result\" (>80% or <=80%) if Day 0 (or Day 1) and Day 4 values are available. Refer to http://www.[email or web address]-pct-calculator.com    Change in PCT <=80%  A decrease of PCT levels below or equal to 80% defines a positive change in PCT test result representing a higher risk for 28-day all-cause mortality of patients diagnosed with severe sepsis for septic shock.    Change in PCT >80%  A decrease of PCT levels of more " than 80% defines a negative change in PCT result representing a lower risk for 28-day all-cause mortality of patients diagnosed with severe sepsis or septic shock.       Scan Slide [[id number]] Collected: 07/20/23 1729    Specimen: Blood Updated: 07/20/23 1802     Anisocytosis Slight/1+     Hypochromia Mod/2+     Microcytes Slight/1+     WBC Morphology Normal     Platelet Estimate Adequate    COVID-19 and FLU A/B PCR - Swab, Nasopharynx [[id number]]  (Normal) Collected: 07/20/23 1745    Specimen: Swab from Nasopharynx Updated: 07/20/23 1809     COVID19 Not Detected     Influenza A PCR Not Detected     Influenza B PCR Not Detected    Narrative:      Fact sheet for providers: https://www.fda.gov/media/183001/download    Fact sheet for patients: https://www.fda.gov/media/830759/download    Test performed by PCR.    High Sensitivity Troponin T 2Hr [[id number]]  (Abnormal) Collected: 07/20/23 1908    Specimen: Blood Updated: 07/20/23 1937     HS Troponin T 17 ng/L      Troponin T Delta -1 ng/L     Narrative:      High Sensitive Troponin T Reference Range:  <10.0 ng/L- Negative Female for AMI  <15.0 ng/L- Negative Male for AMI  >=10 - Abnormal Female indicating possible myocardial injury.  >=15 - Abnormal Male indicating possible myocardial injury.   Clinicians would have to utilize clinical acumen, EKG, Troponin, and serial changes to determine if it is an Acute Myocardial Infarction or myocardial injury due to an underlying chronic condition.                  CT Angiogram Chest    Result Date: 7/20/2023  FINAL REPORT TECHNIQUE: Postcontrast axial images of the chest were performed in a CTA protocol. The study was performed with techniques to keep radiation dose as low as reasonably achievable, (ALARA). Individual dose reduction techniques using automated exposure control or adjustment of mA and/or kV according to the patient's size were employed. CLINICAL HISTORY: widened mediastinum on CXR COPD, NEAR SYNCOPAL EPISODE AT  [facility] OFFICE TODAY. FINDINGS: There is a 30 mm left adrenal adenoma.  A 50 mm hyperdense lesion in the upper pole the right kidney with peripheral calcification consistent with a cyst.   Cholecystectomy clips are noted.  There is no axillary adenopathy.  There is no hilar or mediastinal adenopathy.   The heart size is normal.  There is no pericardial or pleural effusion.  No filling defects are identified to suggest PE.  There is an infrarenal abdominal aortic aneurysm.  There is no aortic dissection.   No suspicious infiltrate or nodule is identified.     Impression: No PE or dissection. Authenticated and Electronically Signed by Jasiel Browne MD on 07/20/2023 08:30:35 PM        [facility]     Amount and/or Complexity of Data Reviewed  Decide to obtain previous medical records or to obtain history from someone other than the patient: yes  Independent visualization of images, tracings, or specimens: yes    Patient was evaluated in the ER for shortness of breath and near syncopal episode, found to be hypoxic on 4 L nasal cannula which is what she normally wears at her PCPs office and was referred to the ER.  Patient is hemodynamically stable, afebrile, nontoxic-appearing on exam.  Per triage note, patient was 80% upon arrival.  EMS reported that patient was 94% on 6 L nasal cannula.  During my evaluation she is at 99% oxygen saturation at 6 L nasal cannula.  This was turned down to 4 L and we will see how the patient does.  Differential diagnosis includes but is not limited to vasovagal episode, reaction to pneumococcal injection, COPD exacerbation, CHF, pneumonia, among others.  Initial plan includes CBC, CMP, troponin, BNP, magnesium, procalcitonin, COVID/flu swab, EKG, chest x-ray, and treatment with IV Decadron and albuterol inhaler.    Labs are largely unremarkable with normal procalcitonin, troponin of 18 which decreased to 17.  Patient has chronically elevated troponins.  COVID and flu are negative.  BMP  within normal limits.  Patient is noted to be chronically anemic with hemoglobin of 7.5 which appears to be her baseline.  She has been doing well on her usual 4 L of oxygen since arriving in the ER maintaining oxygen saturations above 90.  Patient was given a Decadron injection and an albuterol inhaler in the ER.  Chest x-ray did reveal widened mediastinum and CTA was ordered for further evaluation.  It revealed no aortic dissection or PE.  It did incidentally reveal an infrarenal abdominal aortic aneurysm as well as a kidney cyst and adrenal adenoma which patient seems to be aware of.  Patient is more than ready for discharge.  She was advised to follow-up with her PCP for further outpatient evaluation if symptoms persist.  Precautions were given for return to the ER for any new or worsening symptoms.    Final diagnoses:   Near syncope   Hypoxic episode          Debra Diaz PA-C  07/20/23 2042

## 2023-07-21 NOTE — DISCHARGE INSTRUCTIONS
Continue using home oxygen.  Follow-up with your PCP for further outpatient evaluation.  Return to the ER for new or worsening symptoms or acute concerns.

## 2023-07-24 ENCOUNTER — REFERRAL TRIAGE (OUTPATIENT)
Dept: CASE MANAGEMENT | Facility: OTHER | Age: 78
End: 2023-07-24
Payer: MEDICARE

## 2023-07-24 ENCOUNTER — TELEPHONE (OUTPATIENT)
Dept: CARDIOLOGY | Facility: CLINIC | Age: 78
End: 2023-07-24
Payer: MEDICARE

## 2023-07-24 DIAGNOSIS — J96.22 ACUTE ON CHRONIC RESPIRATORY FAILURE WITH HYPOXIA AND HYPERCAPNIA: Primary | ICD-10-CM

## 2023-07-24 DIAGNOSIS — J44.1 COPD EXACERBATION: ICD-10-CM

## 2023-07-24 DIAGNOSIS — J96.21 ACUTE ON CHRONIC RESPIRATORY FAILURE WITH HYPOXIA AND HYPERCAPNIA: Primary | ICD-10-CM

## 2023-07-24 NOTE — TELEPHONE ENCOUNTER
Pts family called asking for samples of eliquis. We are currently out and having a difficult time getting it alejandra. Pts family was supposed to bring in paper work, verbal was given to the 6/29, and will bring that in for the Pt assistance. Pts family is asking if she can take a higher dose ASA. PA was denied for Eliquis as well

## 2023-07-25 RX ORDER — CETIRIZINE HYDROCHLORIDE 10 MG/1
10 TABLET ORAL DAILY
Qty: 30 TABLET | Refills: 2 | Status: SHIPPED | OUTPATIENT
Start: 2023-07-25

## 2023-07-25 NOTE — TELEPHONE ENCOUNTER
Aspirin is not indicated for stroke prevention for patients with PAF.  Options are Eliquis, Xarelto, and warfarin.

## 2023-07-26 ENCOUNTER — PATIENT OUTREACH (OUTPATIENT)
Dept: CASE MANAGEMENT | Facility: OTHER | Age: 78
End: 2023-07-26
Payer: MEDICARE

## 2023-07-26 DIAGNOSIS — J44.1 COPD EXACERBATION: Primary | ICD-10-CM

## 2023-07-26 DIAGNOSIS — F41.9 ANXIETY: ICD-10-CM

## 2023-07-26 DIAGNOSIS — J96.21 ACUTE ON CHRONIC RESPIRATORY FAILURE WITH HYPOXIA AND HYPERCAPNIA: Primary | ICD-10-CM

## 2023-07-26 DIAGNOSIS — J96.21 ACUTE ON CHRONIC RESPIRATORY FAILURE WITH HYPOXIA AND HYPERCAPNIA: ICD-10-CM

## 2023-07-26 DIAGNOSIS — J44.1 COPD EXACERBATION: ICD-10-CM

## 2023-07-26 DIAGNOSIS — J96.22 ACUTE ON CHRONIC RESPIRATORY FAILURE WITH HYPOXIA AND HYPERCAPNIA: Primary | ICD-10-CM

## 2023-07-26 DIAGNOSIS — J96.22 ACUTE ON CHRONIC RESPIRATORY FAILURE WITH HYPOXIA AND HYPERCAPNIA: ICD-10-CM

## 2023-07-26 NOTE — OUTREACH NOTE
AMBULATORY CASE MANAGEMENT NOTE    Name and Relationship of Patient/Support Person: JeffMalu - Emergency Contact    Patient Outreach    AMB CM outreach with Patient and daughter. Patient wants to remain at home and the daughter would like information/resources on home care givers. Provided contact information for local private agencies; [facility] at [phone]. AMB CM provided information on local home care based waiver programs and the [facility]; MAP 10 will be provided to the agency of their choice.    Reinforced contact information for [facility] for any questions or concerns.    Care Coordination    Care coordination with primary care and [facility]. Care coordination with Dr. Quinteros for completion of requested MAP 10 for home care based waiver services. Referral to [facility] for concerns and questions regarding MEDICAID process and home resources.    Soledad JACOBS  Ambulatory Case Management    7/26/2023, 11:35 EDT

## 2023-07-26 NOTE — OUTREACH NOTE
AMBULATORY CASE MANAGEMENT NOTE    Patient Outreach    AMB CM met with Patient and her daughter in the office last week and reviewed program options with them. Patient/Daughter are not interested in CCM but are agreeable to assistance for home services and waiver program information. Will close CCM; Patient declined.     Soledad JACOBS  Ambulatory Case Management    7/26/2023, 11:24 EDT

## 2023-07-30 ENCOUNTER — APPOINTMENT (OUTPATIENT)
Dept: GENERAL RADIOLOGY | Facility: HOSPITAL | Age: 78
End: 2023-07-30
Payer: MEDICARE

## 2023-07-30 ENCOUNTER — HOSPITAL ENCOUNTER (EMERGENCY)
Facility: HOSPITAL | Age: 78
Discharge: HOME | End: 2023-07-30
Attending: EMERGENCY MEDICINE | Admitting: EMERGENCY MEDICINE
Payer: MEDICARE

## 2023-07-30 ENCOUNTER — NURSE TRIAGE (OUTPATIENT)
Dept: CALL CENTER | Facility: HOSPITAL | Age: 78
End: 2023-07-30
Payer: MEDICARE

## 2023-07-30 VITALS
SYSTOLIC BLOOD PRESSURE: 130 MMHG | DIASTOLIC BLOOD PRESSURE: 71 MMHG | HEIGHT: 65 IN | OXYGEN SATURATION: 98 % | TEMPERATURE: 98.3 F | HEART RATE: 75 BPM | BODY MASS INDEX: 26.66 KG/M2 | WEIGHT: 160 LBS | RESPIRATION RATE: 18 BRPM

## 2023-07-30 DIAGNOSIS — R07.82 INTERCOSTAL PAIN: Primary | ICD-10-CM

## 2023-07-30 LAB
ALBUMIN SERPL-MCNC: 4.2 G/DL (ref 3.5–5.2)
ALBUMIN/GLOB SERPL: 1.9 G/DL
ALP SERPL-CCNC: 56 U/L (ref 39–117)
ALT SERPL W P-5'-P-CCNC: 25 U/L (ref 1–33)
ANION GAP SERPL CALCULATED.3IONS-SCNC: 7.6 MMOL/L (ref 5–15)
ANISOCYTOSIS BLD QL: NORMAL
AST SERPL-CCNC: 23 U/L (ref 1–32)
BASOPHILS # BLD AUTO: 0.05 10*3/MM3 (ref 0–0.2)
BASOPHILS NFR BLD AUTO: 0.6 % (ref 0–1.5)
BILIRUB SERPL-MCNC: 0.2 MG/DL (ref 0–1.2)
BUN SERPL-MCNC: 17 MG/DL (ref 8–23)
BUN/CREAT SERPL: 27 (ref 7–25)
CALCIUM SPEC-SCNC: 8.7 MG/DL (ref 8.6–10.5)
CHLORIDE SERPL-SCNC: 97 MMOL/L (ref 98–107)
CO2 SERPL-SCNC: 35.4 MMOL/L (ref 22–29)
CREAT SERPL-MCNC: 0.63 MG/DL (ref 0.57–1)
DEPRECATED RDW RBC AUTO: 52.4 FL (ref 37–54)
EGFRCR SERPLBLD CKD-EPI 2021: 92.1 ML/MIN/1.73
EOSINOPHIL # BLD AUTO: 0.16 10*3/MM3 (ref 0–0.4)
EOSINOPHIL NFR BLD AUTO: 2.1 % (ref 0.3–6.2)
ERYTHROCYTE [DISTWIDTH] IN BLOOD BY AUTOMATED COUNT: 17.8 % (ref 12.3–15.4)
GEN 5 2HR TROPONIN T REFLEX: 22 NG/L
GLOBULIN UR ELPH-MCNC: 2.2 GM/DL
GLUCOSE SERPL-MCNC: 89 MG/DL (ref 65–99)
HCT VFR BLD AUTO: 27.9 % (ref 34–46.6)
HGB BLD-MCNC: 7.6 G/DL (ref 12–15.9)
HOLD SPECIMEN: NORMAL
HOLD SPECIMEN: NORMAL
HYPOCHROMIA BLD QL: NORMAL
IMM GRANULOCYTES # BLD AUTO: 0.02 10*3/MM3 (ref 0–0.05)
IMM GRANULOCYTES NFR BLD AUTO: 0.3 % (ref 0–0.5)
LYMPHOCYTES # BLD AUTO: 1.71 10*3/MM3 (ref 0.7–3.1)
LYMPHOCYTES NFR BLD AUTO: 22 % (ref 19.6–45.3)
MCH RBC QN AUTO: 21.7 PG (ref 26.6–33)
MCHC RBC AUTO-ENTMCNC: 27.2 G/DL (ref 31.5–35.7)
MCV RBC AUTO: 79.7 FL (ref 79–97)
MONOCYTES # BLD AUTO: 0.58 10*3/MM3 (ref 0.1–0.9)
MONOCYTES NFR BLD AUTO: 7.5 % (ref 5–12)
NEUTROPHILS NFR BLD AUTO: 5.26 10*3/MM3 (ref 1.7–7)
NEUTROPHILS NFR BLD AUTO: 67.5 % (ref 42.7–76)
NRBC BLD AUTO-RTO: 0 /100 WBC (ref 0–0.2)
NT-PROBNP SERPL-MCNC: 1694 PG/ML (ref 0–1800)
PLATELET # BLD AUTO: 253 10*3/MM3 (ref 140–450)
PMV BLD AUTO: 10.4 FL (ref 6–12)
POTASSIUM SERPL-SCNC: 4.3 MMOL/L (ref 3.5–5.2)
PROT SERPL-MCNC: 6.4 G/DL (ref 6–8.5)
RBC # BLD AUTO: 3.5 10*6/MM3 (ref 3.77–5.28)
SMALL PLATELETS BLD QL SMEAR: ADEQUATE
SODIUM SERPL-SCNC: 140 MMOL/L (ref 136–145)
TROPONIN T DELTA: -1 NG/L
TROPONIN T SERPL HS-MCNC: 23 NG/L
WBC MORPH BLD: NORMAL
WBC NRBC COR # BLD: 7.78 10*3/MM3 (ref 3.4–10.8)
WHOLE BLOOD HOLD COAG: NORMAL
WHOLE BLOOD HOLD SPECIMEN: NORMAL

## 2023-07-30 PROCEDURE — 71045 X-RAY EXAM CHEST 1 VIEW: CPT

## 2023-07-30 PROCEDURE — 84484 ASSAY OF TROPONIN QUANT: CPT | Performed by: NURSE PRACTITIONER

## 2023-07-30 PROCEDURE — 83880 ASSAY OF NATRIURETIC PEPTIDE: CPT | Performed by: NURSE PRACTITIONER

## 2023-07-30 PROCEDURE — 36415 COLL VENOUS BLD VENIPUNCTURE: CPT

## 2023-07-30 PROCEDURE — 85025 COMPLETE CBC W/AUTO DIFF WBC: CPT | Performed by: NURSE PRACTITIONER

## 2023-07-30 PROCEDURE — 96374 THER/PROPH/DIAG INJ IV PUSH: CPT

## 2023-07-30 PROCEDURE — 80053 COMPREHEN METABOLIC PANEL: CPT | Performed by: NURSE PRACTITIONER

## 2023-07-30 PROCEDURE — 99283 EMERGENCY DEPT VISIT LOW MDM: CPT

## 2023-07-30 PROCEDURE — 85007 BL SMEAR W/DIFF WBC COUNT: CPT | Performed by: NURSE PRACTITIONER

## 2023-07-30 PROCEDURE — 25010000002 KETOROLAC TROMETHAMINE PER 15 MG: Performed by: NURSE PRACTITIONER

## 2023-07-30 RX ORDER — SODIUM CHLORIDE 0.9 % (FLUSH) 0.9 %
10 SYRINGE (ML) INJECTION AS NEEDED
Status: DISCONTINUED | OUTPATIENT
Start: 2023-07-30 | End: 2023-07-31 | Stop reason: HOSPADM

## 2023-07-30 RX ORDER — KETOROLAC TROMETHAMINE 30 MG/ML
15 INJECTION, SOLUTION INTRAMUSCULAR; INTRAVENOUS ONCE
Status: COMPLETED | OUTPATIENT
Start: 2023-07-30 | End: 2023-07-30

## 2023-07-30 RX ADMIN — KETOROLAC TROMETHAMINE 15 MG: 30 INJECTION, SOLUTION INTRAMUSCULAR; INTRAVENOUS at 23:27

## 2023-07-30 NOTE — TELEPHONE ENCOUNTER
Patient is having chest discomfort and feelings of fullness. Does not feel right. Ankles are swollen. On 02 and satting 97% but feels like something is wrong. She has not taken her Eliquis for 2 weeks because her Dr office ran out of samples. Advised to call EMS or get her to ED. Daughter with her and verbalized understanding  Reason for Disposition   Sounds like a life-threatening emergency to the triager    Additional Information   Negative: CARDIAC ARREST suspected   Negative: Breathing stopped   Negative: Anaphylactic reaction (life-threatening allergic reaction)   Negative: Asthma attack, severe (e.g., struggling for each breath, unable to speak)   Negative: Bleeding (severe) from skin AND can't be stopped   Negative: Bleeding (severe) from nose, mouth, vomiting, anus, vagina AND can't be stopped   Negative: Breathing difficulty, severe (e.g., struggling for each breath, unable to speak)   Negative: Burn, severe (e.g., burn area larger than 20 palms of hand [>10% BSA] with blisters)   Negative: Choking, severe (e.g., unable to breathe, talk)   Negative: Coma (e.g., unconscious, not responding to verbal or painful stimulus)   Negative: Cyanosis (bluish or gray lips or face)   Negative: Dehydration, severe (e.g., cold/pale/clammy skin, too weak to stand)   Negative: Drowning, near   Negative: Electrical shock, severe   Negative: Heart attack suspected   Negative: Homicidal threat   Negative: Hyperthermia (from heat exposure) > 105 F (40.5 C) rectally or > 104 F (40.0 C) orally   Negative: Hypothermia (from cold exposure) < 95 F (35 C) rectally or < 94 F (34.4 C) orally   Negative: Lightning strike injury   Negative: Meningococcal sepsis suspected (purple spots/dots with fever)   Negative: Mental status altered (confusion) now   Negative: Neck trauma, major (e.g., MVA, diving, trampoline, contact sports, fall > 10 feet or 3 meters, hanging)   Negative: Penetrating wound of chest or abdomen   Negative:  "Purpura/petechiae with fever (purple spots/dots with fever)   Negative: Respiratory distress, severe (e.g., struggling for each breath, unable to speak)   Negative: Seizure lasts > 5 minutes or first seizure ever   Negative: Seizure caused by head injury   Negative: Shock suspected (e.g., cold/pale/clammy skin, too weak to stand, low BP, rapid pulse)   Negative: Stroke suspected (e.g., sudden onset of weakness of the face, arm or leg on one side of the body)   Negative: Suicide attempt   Negative: Trauma, severe   Negative: Weakness, severe (i.e., unable to walk or barely able to walk, requires support) AND new-onset or worsening    Answer Assessment - Initial Assessment Questions  1. SYMPTOMS: \"What is the most serious symptom?\"      Patient is having chest discomfort and feelings of fullness. Does not feel right. Ankles are swollen. On 02 and satting 97% but feels like something is wrong. She has not taken her Eliquis for 2 weeks because her Dr office ran out of samples. Advised to call EMS or get her to ED.   2. AIRWAY: \"Are they breathing?\" (e.g., Yes, No)  (R/O: airway blockage, respiratory arrest)       Yes  3. BREATHING: \"Is there difficulty breathing?\" (e.g., Yes, No, Unknown; severity)  (R/O: respiratory distress)      Baseline with COPD and emphysema. On o2 at home. Sat is 97%  4. CIRCULATION: \"Are you feeling weak?\"  (e.g., Yes, No, Unknown; severity)  (R/O: shock)      Ankles are swelling at this time   5. BLEEDING: \"Is there any bleeding?\" (e.g., Yes, No) If Yes, ask: \"How bad is the bleeding?\" (e.g., actively dripping or spurting)  (R/O: hemorrhage)      no  6. CONSCIOUS: \"Are they awake and alert?\" (e.g., alert-oriented, confused, lethargic, stuporous, comatose) (R/O: altered mental status, coma)      yes    Protocols used: 911 Symptoms-ADULT-AH    "

## 2023-07-31 ENCOUNTER — TELEPHONE (OUTPATIENT)
Dept: CARDIOLOGY | Facility: CLINIC | Age: 78
End: 2023-07-31
Payer: MEDICARE

## 2023-07-31 DIAGNOSIS — I48.0 PAROXYSMAL ATRIAL FIBRILLATION: ICD-10-CM

## 2023-07-31 RX ORDER — DILTIAZEM HYDROCHLORIDE 120 MG/1
240 CAPSULE, COATED, EXTENDED RELEASE ORAL DAILY
Qty: 60 CAPSULE | Refills: 11 | Status: ON HOLD | OUTPATIENT
Start: 2023-07-31

## 2023-07-31 RX ORDER — METOPROLOL SUCCINATE 25 MG/1
25 TABLET, EXTENDED RELEASE ORAL DAILY
Qty: 30 TABLET | Refills: 11 | Status: ON HOLD | OUTPATIENT
Start: 2023-07-31

## 2023-08-01 ENCOUNTER — PATIENT OUTREACH (OUTPATIENT)
Dept: CASE MANAGEMENT | Facility: OTHER | Age: 78
End: 2023-08-01
Payer: MEDICARE

## 2023-08-03 ENCOUNTER — TELEPHONE (OUTPATIENT)
Dept: CARDIOLOGY | Facility: CLINIC | Age: 78
End: 2023-08-03
Payer: MEDICARE

## 2023-08-07 ENCOUNTER — APPOINTMENT (OUTPATIENT)
Dept: CT IMAGING | Facility: HOSPITAL | Age: 78
DRG: 329 | End: 2023-08-07
Payer: MEDICARE

## 2023-08-07 ENCOUNTER — HOSPITAL ENCOUNTER (INPATIENT)
Facility: HOSPITAL | Age: 78
LOS: 9 days | Discharge: HOME | DRG: 329 | End: 2023-08-18
Attending: EMERGENCY MEDICINE
Payer: MEDICARE

## 2023-08-07 ENCOUNTER — TELEPHONE (OUTPATIENT)
Dept: CARDIOLOGY | Facility: CLINIC | Age: 78
End: 2023-08-07
Payer: MEDICARE

## 2023-08-07 DIAGNOSIS — F41.9 ANXIETY: ICD-10-CM

## 2023-08-07 DIAGNOSIS — I25.10 CORONARY ARTERY DISEASE INVOLVING NATIVE CORONARY ARTERY OF NATIVE HEART WITHOUT ANGINA PECTORIS: ICD-10-CM

## 2023-08-07 DIAGNOSIS — J96.21 ACUTE ON CHRONIC RESPIRATORY FAILURE WITH HYPOXIA AND HYPERCAPNIA: ICD-10-CM

## 2023-08-07 DIAGNOSIS — K56.600 PARTIAL SMALL BOWEL OBSTRUCTION: Primary | ICD-10-CM

## 2023-08-07 DIAGNOSIS — J96.22 ACUTE ON CHRONIC RESPIRATORY FAILURE WITH HYPOXIA AND HYPERCAPNIA: ICD-10-CM

## 2023-08-07 DIAGNOSIS — R91.8 ABNORMAL CT SCAN OF LUNG: ICD-10-CM

## 2023-08-07 DIAGNOSIS — J44.1 COPD EXACERBATION: ICD-10-CM

## 2023-08-07 DIAGNOSIS — I48.91 ATRIAL FIBRILLATION WITH RAPID VENTRICULAR RESPONSE: ICD-10-CM

## 2023-08-07 LAB
ALBUMIN SERPL-MCNC: 4.3 G/DL (ref 3.5–5.2)
ALBUMIN/GLOB SERPL: 1.7 G/DL
ALP SERPL-CCNC: 59 U/L (ref 39–117)
ALT SERPL W P-5'-P-CCNC: 12 U/L (ref 1–33)
ANION GAP SERPL CALCULATED.3IONS-SCNC: 8.2 MMOL/L (ref 5–15)
AST SERPL-CCNC: 19 U/L (ref 1–32)
BACTERIA UR QL AUTO: ABNORMAL /HPF
BASOPHILS # BLD AUTO: 0.05 10*3/MM3 (ref 0–0.2)
BASOPHILS NFR BLD AUTO: 0.4 % (ref 0–1.5)
BILIRUB SERPL-MCNC: 0.3 MG/DL (ref 0–1.2)
BILIRUB UR QL STRIP: NEGATIVE
BUN SERPL-MCNC: 23 MG/DL (ref 8–23)
BUN/CREAT SERPL: 35.4 (ref 7–25)
CALCIUM SPEC-SCNC: 9.2 MG/DL (ref 8.6–10.5)
CHLORIDE SERPL-SCNC: 102 MMOL/L (ref 98–107)
CLARITY UR: CLEAR
CLUMPED PLATELETS: PRESENT
CO2 SERPL-SCNC: 34.8 MMOL/L (ref 22–29)
COLOR UR: YELLOW
CREAT SERPL-MCNC: 0.65 MG/DL (ref 0.57–1)
D-LACTATE SERPL-SCNC: 1 MMOL/L (ref 0.5–2)
DEPRECATED RDW RBC AUTO: 55.8 FL (ref 37–54)
EGFRCR SERPLBLD CKD-EPI 2021: 91.4 ML/MIN/1.73
EOSINOPHIL # BLD AUTO: 0.05 10*3/MM3 (ref 0–0.4)
EOSINOPHIL NFR BLD AUTO: 0.4 % (ref 0.3–6.2)
ERYTHROCYTE [DISTWIDTH] IN BLOOD BY AUTOMATED COUNT: 19.1 % (ref 12.3–15.4)
GLOBULIN UR ELPH-MCNC: 2.6 GM/DL
GLUCOSE SERPL-MCNC: 142 MG/DL (ref 65–99)
GLUCOSE UR STRIP-MCNC: NEGATIVE MG/DL
HCT VFR BLD AUTO: 33.9 % (ref 34–46.6)
HGB BLD-MCNC: 8.8 G/DL (ref 12–15.9)
HGB UR QL STRIP.AUTO: NEGATIVE
HOLD SPECIMEN: NORMAL
HOLD SPECIMEN: NORMAL
HYALINE CASTS UR QL AUTO: ABNORMAL /LPF
HYPOCHROMIA BLD QL: NORMAL
IMM GRANULOCYTES # BLD AUTO: 0.05 10*3/MM3 (ref 0–0.05)
IMM GRANULOCYTES NFR BLD AUTO: 0.4 % (ref 0–0.5)
KETONES UR QL STRIP: NEGATIVE
LEUKOCYTE ESTERASE UR QL STRIP.AUTO: NEGATIVE
LIPASE SERPL-CCNC: 23 U/L (ref 13–60)
LYMPHOCYTES # BLD AUTO: 1.24 10*3/MM3 (ref 0.7–3.1)
LYMPHOCYTES NFR BLD AUTO: 9.6 % (ref 19.6–45.3)
MCH RBC QN AUTO: 21.1 PG (ref 26.6–33)
MCHC RBC AUTO-ENTMCNC: 26 G/DL (ref 31.5–35.7)
MCV RBC AUTO: 81.3 FL (ref 79–97)
MONOCYTES # BLD AUTO: 0.61 10*3/MM3 (ref 0.1–0.9)
MONOCYTES NFR BLD AUTO: 4.7 % (ref 5–12)
MUCOUS THREADS URNS QL MICRO: ABNORMAL /HPF
NEUTROPHILS NFR BLD AUTO: 10.9 10*3/MM3 (ref 1.7–7)
NEUTROPHILS NFR BLD AUTO: 84.5 % (ref 42.7–76)
NITRITE UR QL STRIP: NEGATIVE
NRBC BLD AUTO-RTO: 0 /100 WBC (ref 0–0.2)
PH UR STRIP.AUTO: 6 [PH] (ref 5–8)
PLATELET # BLD AUTO: 112 10*3/MM3 (ref 140–450)
PMV BLD AUTO: ABNORMAL FL
POTASSIUM SERPL-SCNC: 4.4 MMOL/L (ref 3.5–5.2)
PROT SERPL-MCNC: 6.9 G/DL (ref 6–8.5)
PROT UR QL STRIP: ABNORMAL
RBC # BLD AUTO: 4.17 10*6/MM3 (ref 3.77–5.28)
RBC # UR STRIP: ABNORMAL /HPF
REF LAB TEST METHOD: ABNORMAL
SODIUM SERPL-SCNC: 145 MMOL/L (ref 136–145)
SP GR UR STRIP: >1.03 (ref 1–1.03)
SQUAMOUS #/AREA URNS HPF: ABNORMAL /HPF
STOMATOCYTES BLD QL SMEAR: NORMAL
UROBILINOGEN UR QL STRIP: ABNORMAL
WBC # UR STRIP: ABNORMAL /HPF
WBC MORPH BLD: NORMAL
WBC NRBC COR # BLD: 12.9 10*3/MM3 (ref 3.4–10.8)
WHOLE BLOOD HOLD COAG: NORMAL
WHOLE BLOOD HOLD SPECIMEN: NORMAL

## 2023-08-07 PROCEDURE — 25510000001 IOPAMIDOL 61 % SOLUTION: Performed by: EMERGENCY MEDICINE

## 2023-08-07 PROCEDURE — 93005 ELECTROCARDIOGRAM TRACING: CPT | Performed by: INTERNAL MEDICINE

## 2023-08-07 PROCEDURE — 25010000002 ONDANSETRON PER 1 MG: Performed by: EMERGENCY MEDICINE

## 2023-08-07 PROCEDURE — 83690 ASSAY OF LIPASE: CPT

## 2023-08-07 PROCEDURE — 99285 EMERGENCY DEPT VISIT HI MDM: CPT

## 2023-08-07 PROCEDURE — 81001 URINALYSIS AUTO W/SCOPE: CPT | Performed by: EMERGENCY MEDICINE

## 2023-08-07 PROCEDURE — 83605 ASSAY OF LACTIC ACID: CPT

## 2023-08-07 PROCEDURE — 99222 1ST HOSP IP/OBS MODERATE 55: CPT | Performed by: INTERNAL MEDICINE

## 2023-08-07 PROCEDURE — 25010000002 MORPHINE PER 10 MG: Performed by: INTERNAL MEDICINE

## 2023-08-07 PROCEDURE — 25010000002 ENOXAPARIN PER 10 MG: Performed by: INTERNAL MEDICINE

## 2023-08-07 PROCEDURE — G0378 HOSPITAL OBSERVATION PER HR: HCPCS

## 2023-08-07 PROCEDURE — 25010000002 MORPHINE PER 10 MG: Performed by: EMERGENCY MEDICINE

## 2023-08-07 PROCEDURE — 36415 COLL VENOUS BLD VENIPUNCTURE: CPT

## 2023-08-07 PROCEDURE — 85007 BL SMEAR W/DIFF WBC COUNT: CPT | Performed by: EMERGENCY MEDICINE

## 2023-08-07 PROCEDURE — 85025 COMPLETE CBC W/AUTO DIFF WBC: CPT

## 2023-08-07 PROCEDURE — 74177 CT ABD & PELVIS W/CONTRAST: CPT

## 2023-08-07 PROCEDURE — 80053 COMPREHEN METABOLIC PANEL: CPT

## 2023-08-07 RX ORDER — ONDANSETRON 2 MG/ML
4 INJECTION INTRAMUSCULAR; INTRAVENOUS ONCE
Status: COMPLETED | OUTPATIENT
Start: 2023-08-07 | End: 2023-08-07

## 2023-08-07 RX ORDER — SODIUM CHLORIDE 9 MG/ML
40 INJECTION, SOLUTION INTRAVENOUS AS NEEDED
Status: DISCONTINUED | OUTPATIENT
Start: 2023-08-07 | End: 2023-08-14 | Stop reason: SDUPTHER

## 2023-08-07 RX ORDER — KETOROLAC TROMETHAMINE 30 MG/ML
15 INJECTION, SOLUTION INTRAMUSCULAR; INTRAVENOUS EVERY 6 HOURS PRN
Status: DISPENSED | OUTPATIENT
Start: 2023-08-07 | End: 2023-08-12

## 2023-08-07 RX ORDER — NITROGLYCERIN 0.4 MG/1
0.4 TABLET SUBLINGUAL
Status: DISCONTINUED | OUTPATIENT
Start: 2023-08-07 | End: 2023-08-18 | Stop reason: HOSPADM

## 2023-08-07 RX ORDER — ACETAMINOPHEN 650 MG/1
650 SUPPOSITORY RECTAL EVERY 4 HOURS PRN
Status: DISCONTINUED | OUTPATIENT
Start: 2023-08-07 | End: 2023-08-14

## 2023-08-07 RX ORDER — AMOXICILLIN 250 MG
2 CAPSULE ORAL 2 TIMES DAILY
Status: DISCONTINUED | OUTPATIENT
Start: 2023-08-07 | End: 2023-08-14

## 2023-08-07 RX ORDER — SODIUM CHLORIDE 9 MG/ML
75 INJECTION, SOLUTION INTRAVENOUS CONTINUOUS
Status: DISCONTINUED | OUTPATIENT
Start: 2023-08-07 | End: 2023-08-08

## 2023-08-07 RX ORDER — ENOXAPARIN SODIUM 100 MG/ML
1 INJECTION SUBCUTANEOUS EVERY 12 HOURS
Status: DISCONTINUED | OUTPATIENT
Start: 2023-08-07 | End: 2023-08-16

## 2023-08-07 RX ORDER — NALOXONE HCL 0.4 MG/ML
0.4 VIAL (ML) INJECTION
Status: DISCONTINUED | OUTPATIENT
Start: 2023-08-07 | End: 2023-08-08

## 2023-08-07 RX ORDER — POLYETHYLENE GLYCOL 3350 17 G/17G
17 POWDER, FOR SOLUTION ORAL DAILY PRN
Status: DISCONTINUED | OUTPATIENT
Start: 2023-08-07 | End: 2023-08-14

## 2023-08-07 RX ORDER — METOPROLOL TARTRATE 5 MG/5ML
2.5 INJECTION INTRAVENOUS EVERY 6 HOURS
Status: DISCONTINUED | OUTPATIENT
Start: 2023-08-07 | End: 2023-08-10

## 2023-08-07 RX ORDER — ACETAMINOPHEN 325 MG/1
650 TABLET ORAL EVERY 4 HOURS PRN
Status: DISCONTINUED | OUTPATIENT
Start: 2023-08-07 | End: 2023-08-14

## 2023-08-07 RX ORDER — BISACODYL 5 MG/1
5 TABLET, DELAYED RELEASE ORAL DAILY PRN
Status: DISCONTINUED | OUTPATIENT
Start: 2023-08-07 | End: 2023-08-14

## 2023-08-07 RX ORDER — IPRATROPIUM BROMIDE AND ALBUTEROL SULFATE 2.5; .5 MG/3ML; MG/3ML
1.5 SOLUTION RESPIRATORY (INHALATION) EVERY 4 HOURS PRN
Status: DISCONTINUED | OUTPATIENT
Start: 2023-08-07 | End: 2023-08-18 | Stop reason: HOSPADM

## 2023-08-07 RX ORDER — SODIUM CHLORIDE 0.9 % (FLUSH) 0.9 %
10 SYRINGE (ML) INJECTION EVERY 12 HOURS SCHEDULED
Status: DISCONTINUED | OUTPATIENT
Start: 2023-08-07 | End: 2023-08-13

## 2023-08-07 RX ORDER — SODIUM CHLORIDE 0.9 % (FLUSH) 0.9 %
10 SYRINGE (ML) INJECTION AS NEEDED
Status: DISCONTINUED | OUTPATIENT
Start: 2023-08-07 | End: 2023-08-14 | Stop reason: SDUPTHER

## 2023-08-07 RX ORDER — BISACODYL 10 MG
10 SUPPOSITORY, RECTAL RECTAL DAILY PRN
Status: DISCONTINUED | OUTPATIENT
Start: 2023-08-07 | End: 2023-08-14

## 2023-08-07 RX ORDER — CHOLECALCIFEROL (VITAMIN D3) 125 MCG
5 CAPSULE ORAL NIGHTLY PRN
Status: DISCONTINUED | OUTPATIENT
Start: 2023-08-07 | End: 2023-08-14

## 2023-08-07 RX ORDER — MORPHINE SULFATE 2 MG/ML
1 INJECTION, SOLUTION INTRAMUSCULAR; INTRAVENOUS EVERY 4 HOURS PRN
Status: DISCONTINUED | OUTPATIENT
Start: 2023-08-07 | End: 2023-08-08

## 2023-08-07 RX ORDER — DILTIAZEM HYDROCHLORIDE 5 MG/ML
20 INJECTION INTRAVENOUS ONCE
Status: DISCONTINUED | OUTPATIENT
Start: 2023-08-07 | End: 2023-08-09

## 2023-08-07 RX ORDER — ONDANSETRON 2 MG/ML
4 INJECTION INTRAMUSCULAR; INTRAVENOUS EVERY 6 HOURS PRN
Status: DISCONTINUED | OUTPATIENT
Start: 2023-08-07 | End: 2023-08-13

## 2023-08-07 RX ORDER — ACETAMINOPHEN 160 MG/5ML
650 SOLUTION ORAL EVERY 4 HOURS PRN
Status: DISCONTINUED | OUTPATIENT
Start: 2023-08-07 | End: 2023-08-14

## 2023-08-07 RX ADMIN — MORPHINE SULFATE 4 MG: 4 INJECTION, SOLUTION INTRAMUSCULAR; INTRAVENOUS at 17:06

## 2023-08-07 RX ADMIN — IOPAMIDOL 100 ML: 612 INJECTION, SOLUTION INTRAVENOUS at 17:46

## 2023-08-07 RX ADMIN — ENOXAPARIN SODIUM 70 MG: 100 INJECTION SUBCUTANEOUS at 22:08

## 2023-08-07 RX ADMIN — SODIUM CHLORIDE 75 ML/HR: 9 INJECTION, SOLUTION INTRAVENOUS at 22:09

## 2023-08-07 RX ADMIN — MORPHINE SULFATE 4 MG: 4 INJECTION, SOLUTION INTRAMUSCULAR; INTRAVENOUS at 19:22

## 2023-08-07 RX ADMIN — MORPHINE SULFATE 1 MG: 2 INJECTION, SOLUTION INTRAMUSCULAR; INTRAVENOUS at 22:08

## 2023-08-07 RX ADMIN — SODIUM CHLORIDE 1000 ML: 9 INJECTION, SOLUTION INTRAVENOUS at 17:10

## 2023-08-07 RX ADMIN — Medication 10 ML: at 22:08

## 2023-08-07 RX ADMIN — ONDANSETRON 4 MG: 2 INJECTION INTRAMUSCULAR; INTRAVENOUS at 17:05

## 2023-08-07 RX ADMIN — METOPROLOL TARTRATE 2.5 MG: 1 INJECTION, SOLUTION INTRAVENOUS at 22:08

## 2023-08-07 NOTE — H&P
[facility]   HISTORY AND PHYSICAL      Name:  Gabi Dudley   Age:  78 y.o.  Sex:  female  :  1945  MRN:  [id number]   Visit Number:  [id number]  Admission Date:  2023  Date Of Service:  23  Primary Care Physician:  Paul Quinteros MD    Chief Complaint:     Abdominal pain    History Of Presenting Illness:      Patient is a chronically ill 78-year-old female history significant for hypertension, hyperlipidemia, depression/anxiety and CAD who presents to the emergency room with 3 to 4 days of progressively worsening abdominal pain.  Patient describes the pain as intermittent and sharp, diffuse across her abdomen.  Patient has chronic constipation so cannot recall her last bowel movement.  Admits to some mild nausea, denies vomiting.  Patient does have a history of small bowel obstruction which required colectomy .  States pain is similar but not as severe as previous episode.  Nothing makes it better or worse.  Patient does admit to still passing gas.  Upon arrival to the ER, patient afebrile hemodynamically stable and nonhypoxic.  CMP unremarkable except for glucose 142.  Lactate lipase normal.  WBC 13, hemoglobin hematocrit at baseline, platelets 112.  UA negative for urinary tract infection.  CT abdomen pelvis revealed mid small bowel dilation worrisome for partial small bowel obstruction.  Dr. Isaac agreed to consult hospital service asked to admit.    Review Of Systems:    All systems were reviewed and negative except as mentioned in history of presenting illness, assessment and plan.    Past Medical History: Patient  has a past medical history of Adrenal adenoma, Anemia, Arrhythmia, Asthma, Atrial fibrillation, Back pain, Benign colonic polyp, Benign tumor of adrenal gland, Cataract, Cholelithiasis, Chronic bronchitis, Chronic bronchitis with COPD (chronic obstructive pulmonary disease), COPD (chronic obstructive pulmonary disease) (), Coronary artery  disease, Depression, Diverticulosis (Years ago), Elevated cholesterol, Environmental and seasonal allergies, Fibromyalgia, Fibromyalgia, primary (Sometime in the 90s), Gastritis, Generalized anxiety disorder, GERD (gastroesophageal reflux disease), H/O mammogram, Headache (Years ago), Hemorrhoids, History of blood transfusion (1985), History of blood transfusion (1985), History of echocardiogram, History of endometriosis, History of nuclear stress test, Hospitalization or health care facility admission within last 6 months, Hypertension, IBS (irritable bowel syndrome), Impaired functional mobility, balance, gait, and endurance, Impaired mobility, Inverted nipple, Kidney disease, Kidney stone, Liver cyst, Low back pain (Years ago), Nodular radiologic density, Nodule of left lung, On home oxygen therapy, Osteoarthritis, Osteopenia (Several years ago), Osteoporosis, PONV (postoperative nausea and vomiting), Renal cyst, Sinus problem, Sinusitis, Skin cancer, SOB (shortness of breath), Tobacco use, Urinary frequency, Urinary tract infection (Years ago), Vitamin D deficiency, Wears glasses, and Wears partial dentures.    Past Surgical History: Patient  has a past surgical history that includes Appendectomy (1980s); Tonsillectomy (1987); Colonoscopy w/ biopsies and polypectomy; Colonoscopy (03/11/2013); Colonoscopy (06/21/2016); Upper gastrointestinal endoscopy (01/13/2015); Vaginal delivery; Colonoscopy (N/A, 07/24/2019); Cataract extraction (2013); Hysterectomy (1980s); Cardiac catheterization (2003); Abdominal adhesion surgery; cholecystectomy with intraoperative cholangiogram (N/A, 10/30/2020); Exploratory Laparotomy (N/A, 11/23/2020); Cholecystectomy (2020); and Colon surgery (2020).    Social History: Patient  reports that she quit smoking about 2 years ago. Her smoking use included cigarettes. She has a 7.50 pack-year smoking history. She has been exposed to tobacco smoke. She has never used smokeless tobacco. She  reports that she does not drink alcohol and does not use drugs.    Family History:  Patient's family history has been reviewed and found to be noncontributory.     Allergies:      Doxycycline    Home Medications:    Prior to Admission Medications       Prescriptions Last Dose Informant Patient Reported? Taking?    albuterol sulfate  (90 Base) MCG/ACT inhaler   No No    Inhale 2 puffs Every 4 (Four) Hours As Needed for Wheezing.    apixaban (Eliquis) 5 MG tablet tablet   No No    Take 1 tablet by mouth Every 12 (Twelve) Hours.    atorvastatin (LIPITOR) 20 MG tablet   No No    TAKE 1 TABLET BY MOUTH DAILY    Budeson-Glycopyrrol-Formoterol (Breztri Aerosphere) 160-9-4.8 MCG/ACT aerosol inhaler   No No    Inhale 2 puffs 2 (Two) Times a Day. Rinse mouth out after use    cetirizine (zyrTEC) 10 MG tablet   No No    TAKE 1 TABLET BY MOUTH DAILY.    Cholecalciferol (vitamin D3) 125 MCG (5000 UT) capsule capsule  Self Yes No    Take 1 capsule by mouth Daily.    clonazePAM (KlonoPIN) 0.5 MG tablet   No No    Take 1 tablet by mouth Daily.    dilTIAZem CD (CARDIZEM CD) 120 MG 24 hr capsule   No No    Take 2 capsules by mouth Daily.    DULoxetine (CYMBALTA) 60 MG capsule   No No    Take 1 capsule by mouth Daily.    ferrous sulfate 324 (65 Fe) MG tablet delayed-release EC tablet   No No    Take 1 tablet by mouth Daily With Breakfast.    fluticasone (FLONASE) 50 MCG/ACT nasal spray  Self, Child Yes No    2 sprays into the nostril(s) as directed by provider Daily.    furosemide (LASIX) 40 MG tablet   No No    Take 1 tablet by mouth Daily for 3 days.    ipratropium-albuterol (DUO-NEB) 0.5-2.5 mg/3 ml nebulizer   No No    USE 3 mL VIA NEBULIZER EVERY 4 HOURS AS NEEDED FOR WHEEZING    metoprolol succinate XL (TOPROL-XL) 25 MG 24 hr tablet   No No    Take 1 tablet by mouth Daily.    O2 (OXYGEN)   Yes No    Inhale 2 L/min As Needed.    oxyCODONE-acetaminophen (PERCOCET) 7.5-325 MG per tablet   No No    Take 1 tablet by mouth Every  "4 (Four) Hours As Needed for Moderate Pain.    pantoprazole (PROTONIX) 40 MG EC tablet   No No    TAKE 1 TABLET BY MOUTH DAILY    predniSONE (DELTASONE) 20 MG tablet   No No    Take 2 tablets by mouth Daily.    QUEtiapine (SEROquel) 25 MG tablet   No No    Take 0.5 tablets by mouth Every Night.    sertraline (ZOLOFT) 100 MG tablet   No No    TAKE 1 & 1/2 TABLET BY MOUTH DAILY    sodium chloride 0.65 % nasal spray   No No    2 sprays into the nostril(s) as directed by provider As Needed (dry nose).    traZODone (DESYREL) 100 MG tablet   No No    1 qhs po prn          ED Medications:    Medications   sodium chloride 0.9 % flush 10 mL (has no administration in time range)   dilTIAZem (CARDIZEM) injection 20 mg (0 mg Intravenous Hold 8/7/23 1830)   morphine injection 4 mg (has no administration in time range)   sodium chloride 0.9 % bolus 1,000 mL ( Intravenous Currently Infusing 8/7/23 1856)   morphine injection 4 mg (4 mg Intravenous Given 8/7/23 1706)   ondansetron (ZOFRAN) injection 4 mg (4 mg Intravenous Given 8/7/23 1705)   iopamidol (ISOVUE-300) 61 % injection 100 mL (100 mL Intravenous Given 8/7/23 1746)     Vital Signs:  Temp:  [97.6 øF (36.4 øC)] 97.6 øF (36.4 øC)  Heart Rate:  [81-95] 81  Resp:  [17] 17  BP: (128-135)/(85-96) 135/85        08/07/23  1514   Weight: 72.6 kg (160 lb)     Body mass index is 26.63 kg/mý.    Physical Exam:     Most recent vital Signs: /85   Pulse 81   Temp 97.6 øF (36.4 øC) (Oral)   Resp 17   Ht 165.1 cm (65\")   Wt 72.6 kg (160 lb)   SpO2 99%   BMI 26.63 kg/mý     Physical Exam  Vitals reviewed.   Constitutional:       Appearance: Normal appearance.   HENT:      Head: Normocephalic and atraumatic.      Right Ear: External ear normal.      Left Ear: External ear normal.      Mouth/Throat:      Mouth: Mucous membranes are moist.      Pharynx: Oropharynx is clear.   Eyes:      Extraocular Movements: Extraocular movements intact.      Conjunctiva/sclera: Conjunctivae " normal.   Cardiovascular:      Rate and Rhythm: Normal rate and regular rhythm.      Pulses: Normal pulses.      Heart sounds: Normal heart sounds.   Pulmonary:      Effort: Pulmonary effort is normal.      Breath sounds: Normal breath sounds.   Abdominal:      General: Bowel sounds are normal. There is no distension.      Comments: Only slight tenderness to palpation   Musculoskeletal:         General: Normal range of motion.      Right lower leg: No edema.      Left lower leg: No edema.   Skin:     General: Skin is warm and dry.   Neurological:      Mental Status: She is alert and oriented to person, place, and time. Mental status is at baseline.   Psychiatric:         Behavior: Behavior normal.       Laboratory data:    I have reviewed the labs done in the emergency room.    Results from last 7 days   Lab Units 08/07/23  1535   SODIUM mmol/L 145   POTASSIUM mmol/L 4.4   CHLORIDE mmol/L 102   CO2 mmol/L 34.8*   BUN mg/dL 23   CREATININE mg/dL 0.65   CALCIUM mg/dL 9.2   BILIRUBIN mg/dL 0.3   ALK PHOS U/L 59   ALT (SGPT) U/L 12   AST (SGOT) U/L 19   GLUCOSE mg/dL 142*     Results from last 7 days   Lab Units 08/07/23  1535   WBC 10*3/mm3 12.90*   HEMOGLOBIN g/dL 8.8*   HEMATOCRIT % 33.9*   PLATELETS 10*3/mm3 112*                     Results from last 7 days   Lab Units 08/07/23  1535   LIPASE U/L 23         Results from last 7 days   Lab Units 08/07/23  1817   COLOR UA  Yellow   GLUCOSE UA  Negative   KETONES UA  Negative   LEUKOCYTES UA  Negative   PH, URINE  6.0   BILIRUBIN UA  Negative   UROBILINOGEN UA  0.2 E.U./dL   RBC UA /HPF None Seen   WBC UA /HPF 3-5*       Pain Management Panel           No data to display                EKG:          Radiology:    No radiology results for the last 3 days    Assessment:    Partial small bowel obstruction.  POA  Paroxysmal A-fib on Eliquis  Hypertension  Hyperlipidemia  Depression/anxiety  GERD      Plan:    We will admit patient to the hospital for observation.   Anticipate less than 2 midnight stay.  After obtaining further history, patient states that she has not had any vomiting.  She is nondistended on exam.  We will hold on placement of NG tube.  Keep n.p.o. for bowel rest.  IV fluids.  Supportive care with pain control and antiemetics as necessary.  I have transition oral medications over to IV as indicated.  Hold Eliquis, initiate therapeutic Lovenox while NPO.  Dr. Isaac consulted and appreciate recommendations.    ADDENDUM:  At approximately 6 AM, notified by nursing staff that patient complaining of worsening abdominal pain and nausea.  Bowel sounds decreased on exam and abdomen slightly more distended.  Will place order for NG tube insertion.      Risk Assessment: Moderate  DVT Prophylaxis: Therapeutic Lovenox  Code Status: Full  Diet: N.p.o.    Advance Care Planning   ACP discussion was held with the patient during this visit. Patient does not have an advance directive, information provided.           George Durant DO  08/07/23  19:19 EDT    Dictated utilizing Dragon dictation.

## 2023-08-07 NOTE — ED PROVIDER NOTES
HPI: Gabi Dudley is a 78 y.o. female who presents to the emergency department complaining of abdominal pain.  Patient states that last night she developed severe lower abdominal discomfort.  This is a crampy type pain.  It is associated with nausea and vomiting.  She had several days of constipation.  She denies any fevers, chills, chest pain or shortness of breath.  She does note previous history of of small bowel obstruction requiring bowel resection.  She thinks that this feels similar.      REVIEW OF SYSTEMS: All other systems reviewed and are negative     PAST MEDICAL HISTORY:   Past Medical History:   Diagnosis Date    Adrenal adenoma     Anemia     Arrhythmia     Asthma     Atrial fibrillation     Back pain     Benign colonic polyp     Benign tumor of adrenal gland     Cataract     bilateral    Cholelithiasis     Chronic bronchitis     Chronic bronchitis with COPD (chronic obstructive pulmonary disease)     COPD (chronic obstructive pulmonary disease) 2005    Coronary artery disease     Depression     Diverticulosis Years ago    Elevated cholesterol     Environmental and seasonal allergies     Fibromyalgia     Fibromyalgia, primary Sometime in the 90s    Gastritis     Generalized anxiety disorder     GERD (gastroesophageal reflux disease)     H/O mammogram     Headache Years ago    Hemorrhoids     History of blood transfusion 1985    History of blood transfusion 1985    History of echocardiogram     History of endometriosis     History of nuclear stress test     Hospitalization or health care facility admission within last 6 months     5 times between 11/2022-05/2023    Hypertension     IBS (irritable bowel syndrome)     Impaired functional mobility, balance, gait, and endurance     Impaired mobility     Inverted nipple     Kidney disease     Kidney stone     Liver cyst     Low back pain Years ago    Nodular radiologic density     Nodule of left lung     On home oxygen therapy     2 liters NC QHS     Osteoarthritis     Osteopenia Several years ago    Osteoporosis     PONV (postoperative nausea and vomiting)     Renal cyst     Sinus problem         Sinusitis     Skin cancer     basal cell carcinoma    SOB (shortness of breath)     Tobacco use     Urinary frequency     Urinary tract infection Years ago    Frequent UTIs    Vitamin D deficiency     Wears glasses     Wears partial dentures     upper plate        FAMILY HISTORY:   Family History   Problem Relation Age of Onset    Stomach cancer Brother     Colon cancer Maternal Aunt     Brain cancer Father     Arthritis Father     Hypertension Father     Cancer Father         Father, brother, and aunt    Lung cancer Paternal Grandfather     Heart disease Mother         Mother passed away due to heart disease    Breast cancer Neg Hx     Ovarian cancer Neg Hx         SOCIAL HISTORY:   Social History     Socioeconomic History    Marital status:    Tobacco Use    Smoking status: Former     Packs/day: 0.25     Years: 30.00     Pack years: 7.50     Types: Cigarettes     Quit date: 2020     Years since quittin.7     Passive exposure: Past    Smokeless tobacco: Never   Vaping Use    Vaping Use: Never used   Substance and Sexual Activity    Alcohol use: No    Drug use: No    Sexual activity: Not Currently     Birth control/protection: Post-menopausal        SURGICAL HISTORY:   Past Surgical History:   Procedure Laterality Date    APPENDECTOMY      CARDIAC CATHETERIZATION      CATARACT EXTRACTION      both eyes    CHOLECYSTECTOMY      CHOLECYSTECTOMY WITH INTRAOPERATIVE CHOLANGIOGRAM N/A 10/30/2020    Procedure: CHOLECYSTECTOMY LAPAROSCOPIC INTRAOPERATIVE CHOLANGIOGRAPHY;  Surgeon: Sima Moses MD;  Location: [facility];  Service: General;  Laterality: N/A;    COLON SURGERY      COLONOSCOPY  2013    COLONOSCOPY  2016    COLONOSCOPY N/A 2019    Procedure: COLONOSCOPY W/ COLD FORCEP POLYPECTOMIES; HOT SNARE  POLYPECTOMIES; COLD SNARE POLYPECTOMY;  Surgeon: Goyo Nunez MD;  Location: [facility] ENDOSCOPY;  Service: Gastroenterology    COLONOSCOPY W/ BIOPSIES AND POLYPECTOMY      EXPLORATORY LAPAROTOMY N/A 11/23/2020    Procedure: colectomy, right, closure of enterotomy x 2, reduction of internal volvulus;  Surgeon: Sima Moses MD;  Location: [facility] OR;  Service: General;  Laterality: N/A;    HYSTERECTOMY  1980s    partial    LYSIS OF ABDOMINAL ADHESIONS      TONSILLECTOMY  1987    UPPER GASTROINTESTINAL ENDOSCOPY  01/13/2015    VAGINAL DELIVERY      x2        ALLERGIES: Doxycycline       PHYSICAL EXAM:   VITAL SIGNS:   Vitals:    08/07/23 1826   BP:    Pulse:    Resp:    Temp:    SpO2: 99%      CONSTITUTIONAL: Awake, well appearing, nontoxic   HENT: Atraumatic, normocephalic, oral mucosa moist, airway patent. Nares patent without drainage. External ears normal.   EYES: Conjunctivae clear, EOMI, PERRL   NECK: Trachea midline, nontender, supple   CARDIOVASCULAR: Irregularly irregular, rate 90s to 100s PULMONARY/CHEST: Normal work of breathing. Clear to auscultation, no rhonchi, wheezes, or rales.  ABDOMINAL: Nondistended, soft, diffuse tenderness to minimal palpation, no rebound or guarding.  NEUROLOGIC: Nonfocal, moves all four extremities, no gross sensory or motor deficits.   EXTREMITIES: No clubbing, cyanosis, or edema   SKIN: Warm, Dry, No erythema, No rash       ED COURSE / MEDICAL DECISION MAKING:     Gabi Dudley is a 78 y.o. female who presents to the emergency department for evaluation of abdominal pain.  Well-developed, well-nourished lady in no distress with exam as above.  Her vital signs are normal.  Her oxygen saturation is normal on her home O2 at 95%.  Her exam is notable for some lower abdominal tenderness.  Will obtain labs and imaging.  Will give IV fluids and symptomatic treatment.  Disposition pending.    Differential diagnosis includes diverticulitis, obstruction, UTI, kidney stone among  other etiologies.    EKG interpreted by me: Atrial fibrillation, rate 114, no acute ST elevations, this is an abnormal EKG    Repeat EKG interpreted by me: Sinus rhythm, normal rate, no acute ST/T changes, this is a normal EKG    Lab work is nonactionable.  Patient did convert to a sinus rhythm.  CT scan reveals findings concerning for a partial small bowel obstruction.  Discussed with Dr. Isaac who agreed with NG tube.  Discussed with Dr. Durant for admission.  Patient updated and is agreeable with plan of care.    Final diagnoses:   Partial small bowel obstruction   Atrial fibrillation with rapid ventricular response        Alberto Cox MD  08/07/23 [id number]

## 2023-08-07 NOTE — TELEPHONE ENCOUNTER
Caller: Malu Aguilar    Relationship to patient: Emergency Contact    Best call back number: [phone]    Chief complaint: OTHER APPTS    Type of visit: FOLLOW UP    Requested date: LATE AFTERNOON WHEN AVAILABLE.      If rescheduling, when is the original appointment: 09.26.23     Additional notes:PATIENT HAD APPT IN [place] SAME DAY AS PREVIOUS APPT. HUB RESCHEDULED AND ADDED TO WAITLIST. PATIENT PREFERS LATE AFTERNOON IF THERE IS SOONER AVAILABILITY.

## 2023-08-08 ENCOUNTER — APPOINTMENT (OUTPATIENT)
Dept: GENERAL RADIOLOGY | Facility: HOSPITAL | Age: 78
DRG: 329 | End: 2023-08-08
Payer: MEDICARE

## 2023-08-08 ENCOUNTER — APPOINTMENT (OUTPATIENT)
Dept: CT IMAGING | Facility: HOSPITAL | Age: 78
DRG: 329 | End: 2023-08-08
Payer: MEDICARE

## 2023-08-08 LAB
ANION GAP SERPL CALCULATED.3IONS-SCNC: 6.2 MMOL/L (ref 5–15)
BASOPHILS # BLD AUTO: 0.04 10*3/MM3 (ref 0–0.2)
BASOPHILS NFR BLD AUTO: 0.4 % (ref 0–1.5)
BUN SERPL-MCNC: 24 MG/DL (ref 8–23)
BUN/CREAT SERPL: 43.6 (ref 7–25)
CALCIUM SPEC-SCNC: 8.7 MG/DL (ref 8.6–10.5)
CHLORIDE SERPL-SCNC: 104 MMOL/L (ref 98–107)
CO2 SERPL-SCNC: 35.8 MMOL/L (ref 22–29)
CREAT SERPL-MCNC: 0.55 MG/DL (ref 0.57–1)
DEPRECATED RDW RBC AUTO: 55.4 FL (ref 37–54)
EGFRCR SERPLBLD CKD-EPI 2021: 95.1 ML/MIN/1.73
EOSINOPHIL # BLD AUTO: 0.01 10*3/MM3 (ref 0–0.4)
EOSINOPHIL NFR BLD AUTO: 0.1 % (ref 0.3–6.2)
ERYTHROCYTE [DISTWIDTH] IN BLOOD BY AUTOMATED COUNT: 18.7 % (ref 12.3–15.4)
GLUCOSE SERPL-MCNC: 142 MG/DL (ref 65–99)
HCT VFR BLD AUTO: 31.8 % (ref 34–46.6)
HGB BLD-MCNC: 8.5 G/DL (ref 12–15.9)
HYPOCHROMIA BLD QL: NORMAL
IMM GRANULOCYTES # BLD AUTO: 0.03 10*3/MM3 (ref 0–0.05)
IMM GRANULOCYTES NFR BLD AUTO: 0.3 % (ref 0–0.5)
LYMPHOCYTES # BLD AUTO: 0.35 10*3/MM3 (ref 0.7–3.1)
LYMPHOCYTES NFR BLD AUTO: 3.1 % (ref 19.6–45.3)
MCH RBC QN AUTO: 22 PG (ref 26.6–33)
MCHC RBC AUTO-ENTMCNC: 26.7 G/DL (ref 31.5–35.7)
MCV RBC AUTO: 82.2 FL (ref 79–97)
MONOCYTES # BLD AUTO: 0.33 10*3/MM3 (ref 0.1–0.9)
MONOCYTES NFR BLD AUTO: 2.9 % (ref 5–12)
NEUTROPHILS NFR BLD AUTO: 10.52 10*3/MM3 (ref 1.7–7)
NEUTROPHILS NFR BLD AUTO: 93.2 % (ref 42.7–76)
NRBC BLD AUTO-RTO: 0 /100 WBC (ref 0–0.2)
PLATELET # BLD AUTO: 160 10*3/MM3 (ref 140–450)
PMV BLD AUTO: 11.4 FL (ref 6–12)
POTASSIUM SERPL-SCNC: 4 MMOL/L (ref 3.5–5.2)
RBC # BLD AUTO: 3.87 10*6/MM3 (ref 3.77–5.28)
SMALL PLATELETS BLD QL SMEAR: ADEQUATE
SODIUM SERPL-SCNC: 146 MMOL/L (ref 136–145)
WBC MORPH BLD: NORMAL
WBC NRBC COR # BLD: 11.28 10*3/MM3 (ref 3.4–10.8)

## 2023-08-08 PROCEDURE — 85025 COMPLETE CBC W/AUTO DIFF WBC: CPT | Performed by: INTERNAL MEDICINE

## 2023-08-08 PROCEDURE — 85007 BL SMEAR W/DIFF WBC COUNT: CPT | Performed by: INTERNAL MEDICINE

## 2023-08-08 PROCEDURE — 25010000002 KETOROLAC TROMETHAMINE PER 15 MG: Performed by: INTERNAL MEDICINE

## 2023-08-08 PROCEDURE — 86900 BLOOD TYPING SEROLOGIC ABO: CPT

## 2023-08-08 PROCEDURE — 25010000002 ENOXAPARIN PER 10 MG: Performed by: INTERNAL MEDICINE

## 2023-08-08 PROCEDURE — 74177 CT ABD & PELVIS W/CONTRAST: CPT

## 2023-08-08 PROCEDURE — 25010000002 MORPHINE PER 10 MG: Performed by: INTERNAL MEDICINE

## 2023-08-08 PROCEDURE — 25510000001 IOPAMIDOL 61 % SOLUTION: Performed by: INTERNAL MEDICINE

## 2023-08-08 PROCEDURE — 86901 BLOOD TYPING SEROLOGIC RH(D): CPT

## 2023-08-08 PROCEDURE — 25010000002 PROCHLORPERAZINE 10 MG/2ML SOLUTION: Performed by: INTERNAL MEDICINE

## 2023-08-08 PROCEDURE — 25010000002 ONDANSETRON PER 1 MG: Performed by: INTERNAL MEDICINE

## 2023-08-08 PROCEDURE — 99222 1ST HOSP IP/OBS MODERATE 55: CPT | Performed by: STUDENT IN AN ORGANIZED HEALTH CARE EDUCATION/TRAINING PROGRAM

## 2023-08-08 PROCEDURE — 99232 SBSQ HOSP IP/OBS MODERATE 35: CPT | Performed by: INTERNAL MEDICINE

## 2023-08-08 PROCEDURE — G0378 HOSPITAL OBSERVATION PER HR: HCPCS

## 2023-08-08 PROCEDURE — 74018 RADEX ABDOMEN 1 VIEW: CPT

## 2023-08-08 PROCEDURE — 80048 BASIC METABOLIC PNL TOTAL CA: CPT | Performed by: INTERNAL MEDICINE

## 2023-08-08 PROCEDURE — 25010000002 MORPHINE PER 10 MG: Performed by: STUDENT IN AN ORGANIZED HEALTH CARE EDUCATION/TRAINING PROGRAM

## 2023-08-08 PROCEDURE — 0 DIATRIZOATE MEGLUMINE & SODIUM PER 1 ML: Performed by: INTERNAL MEDICINE

## 2023-08-08 PROCEDURE — 25010000002 LORAZEPAM PER 2 MG: Performed by: INTERNAL MEDICINE

## 2023-08-08 RX ORDER — SODIUM CHLORIDE, SODIUM LACTATE, POTASSIUM CHLORIDE, CALCIUM CHLORIDE 600; 310; 30; 20 MG/100ML; MG/100ML; MG/100ML; MG/100ML
100 INJECTION, SOLUTION INTRAVENOUS CONTINUOUS
Status: DISCONTINUED | OUTPATIENT
Start: 2023-08-08 | End: 2023-08-11

## 2023-08-08 RX ORDER — METOPROLOL TARTRATE 5 MG/5ML
5 INJECTION INTRAVENOUS EVERY 4 HOURS PRN
Status: DISCONTINUED | OUTPATIENT
Start: 2023-08-08 | End: 2023-08-09

## 2023-08-08 RX ORDER — PROCHLORPERAZINE EDISYLATE 5 MG/ML
5 INJECTION INTRAMUSCULAR; INTRAVENOUS EVERY 6 HOURS PRN
Status: DISCONTINUED | OUTPATIENT
Start: 2023-08-08 | End: 2023-08-14

## 2023-08-08 RX ORDER — MORPHINE SULFATE 2 MG/ML
2 INJECTION, SOLUTION INTRAMUSCULAR; INTRAVENOUS EVERY 4 HOURS PRN
Status: DISCONTINUED | OUTPATIENT
Start: 2023-08-08 | End: 2023-08-09

## 2023-08-08 RX ORDER — PROCHLORPERAZINE MALEATE 10 MG
5 TABLET ORAL EVERY 6 HOURS PRN
Status: DISCONTINUED | OUTPATIENT
Start: 2023-08-08 | End: 2023-08-14

## 2023-08-08 RX ORDER — NALOXONE HCL 0.4 MG/ML
0.4 VIAL (ML) INJECTION
Status: DISCONTINUED | OUTPATIENT
Start: 2023-08-08 | End: 2023-08-18 | Stop reason: HOSPADM

## 2023-08-08 RX ORDER — LORAZEPAM 2 MG/ML
1 INJECTION INTRAMUSCULAR EVERY 6 HOURS PRN
Status: DISCONTINUED | OUTPATIENT
Start: 2023-08-08 | End: 2023-08-13

## 2023-08-08 RX ORDER — PROCHLORPERAZINE 25 MG
25 SUPPOSITORY, RECTAL RECTAL EVERY 12 HOURS PRN
Status: DISCONTINUED | OUTPATIENT
Start: 2023-08-08 | End: 2023-08-14

## 2023-08-08 RX ORDER — MORPHINE SULFATE 2 MG/ML
2 INJECTION, SOLUTION INTRAMUSCULAR; INTRAVENOUS
Status: DISCONTINUED | OUTPATIENT
Start: 2023-08-08 | End: 2023-08-09 | Stop reason: SDUPTHER

## 2023-08-08 RX ADMIN — IOPAMIDOL 100 ML: 612 INJECTION, SOLUTION INTRAVENOUS at 17:54

## 2023-08-08 RX ADMIN — ENOXAPARIN SODIUM 70 MG: 100 INJECTION SUBCUTANEOUS at 08:30

## 2023-08-08 RX ADMIN — METOPROLOL TARTRATE 2.5 MG: 1 INJECTION, SOLUTION INTRAVENOUS at 08:30

## 2023-08-08 RX ADMIN — DIATRIZOATE MEGLUMINE AND DIATRIZOATE SODIUM 30 ML: 660; 100 LIQUID ORAL; RECTAL at 17:54

## 2023-08-08 RX ADMIN — METOPROLOL TARTRATE 2.5 MG: 1 INJECTION, SOLUTION INTRAVENOUS at 21:28

## 2023-08-08 RX ADMIN — KETOROLAC TROMETHAMINE 15 MG: 30 INJECTION, SOLUTION INTRAMUSCULAR; INTRAVENOUS at 11:07

## 2023-08-08 RX ADMIN — PROCHLORPERAZINE EDISYLATE 5 MG: 5 INJECTION INTRAMUSCULAR; INTRAVENOUS at 21:28

## 2023-08-08 RX ADMIN — LORAZEPAM 1 MG: 2 INJECTION INTRAMUSCULAR; INTRAVENOUS at 13:15

## 2023-08-08 RX ADMIN — PROCHLORPERAZINE EDISYLATE 5 MG: 5 INJECTION INTRAMUSCULAR; INTRAVENOUS at 06:40

## 2023-08-08 RX ADMIN — Medication 2 SPRAY: at 21:28

## 2023-08-08 RX ADMIN — KETOROLAC TROMETHAMINE 15 MG: 30 INJECTION, SOLUTION INTRAMUSCULAR; INTRAVENOUS at 01:29

## 2023-08-08 RX ADMIN — SODIUM CHLORIDE, POTASSIUM CHLORIDE, SODIUM LACTATE AND CALCIUM CHLORIDE 100 ML/HR: 600; 310; 30; 20 INJECTION, SOLUTION INTRAVENOUS at 15:48

## 2023-08-08 RX ADMIN — ONDANSETRON 4 MG: 2 INJECTION INTRAMUSCULAR; INTRAVENOUS at 04:46

## 2023-08-08 RX ADMIN — MORPHINE SULFATE 2 MG: 2 INJECTION, SOLUTION INTRAMUSCULAR; INTRAVENOUS at 21:29

## 2023-08-08 RX ADMIN — SODIUM CHLORIDE, POTASSIUM CHLORIDE, SODIUM LACTATE AND CALCIUM CHLORIDE 500 ML: 600; 310; 30; 20 INJECTION, SOLUTION INTRAVENOUS at 15:47

## 2023-08-08 RX ADMIN — METOPROLOL TARTRATE 2.5 MG: 1 INJECTION, SOLUTION INTRAVENOUS at 04:15

## 2023-08-08 RX ADMIN — METOPROLOL TARTRATE 5 MG: 1 INJECTION, SOLUTION INTRAVENOUS at 15:38

## 2023-08-08 RX ADMIN — MORPHINE SULFATE 1 MG: 2 INJECTION, SOLUTION INTRAMUSCULAR; INTRAVENOUS at 08:30

## 2023-08-08 RX ADMIN — MORPHINE SULFATE 1 MG: 2 INJECTION, SOLUTION INTRAMUSCULAR; INTRAVENOUS at 04:18

## 2023-08-08 RX ADMIN — METOPROLOL TARTRATE 2.5 MG: 1 INJECTION, SOLUTION INTRAVENOUS at 15:14

## 2023-08-08 RX ADMIN — LORAZEPAM 1 MG: 2 INJECTION INTRAMUSCULAR; INTRAVENOUS at 06:41

## 2023-08-08 RX ADMIN — LORAZEPAM 1 MG: 2 INJECTION INTRAMUSCULAR; INTRAVENOUS at 21:28

## 2023-08-08 RX ADMIN — Medication 10 ML: at 08:32

## 2023-08-08 NOTE — PROGRESS NOTES
[facility]    [facility]    Name:  Gabi Dudley   Age:  78 y.o.  Sex:  female  :  1945  MRN:  [id number]   Visit Number:  [id number]  Admission Date:  2023  Date Of Service:  23  Primary Care Physician:  Paul Quinteros MD     LOS: 0 days :    Chief Complaint:      Abdominal pain    Subjective:    2023 Pain not well controlled increased morphine dose from 1 mg to 2 mg.  Dr. Isaac note reviewed.  Vitals and labs reviewed.  Complained of dry mouth.    History Of Presenting Illness:       Patient is a chronically ill 78-year-old female history significant for hypertension, hyperlipidemia, depression/anxiety and CAD who presents to the emergency room with 3 to 4 days of progressively worsening abdominal pain.  Patient describes the pain as intermittent and sharp, diffuse across her abdomen.  Patient has chronic constipation so cannot recall her last bowel movement.  Admits to some mild nausea, denies vomiting.  Patient does have a history of small bowel obstruction which required colectomy .  States pain is similar but not as severe as previous episode.  Nothing makes it better or worse.  Patient does admit to still passing gas.  Upon arrival to the ER, patient afebrile hemodynamically stable and nonhypoxic.  CMP unremarkable except for glucose 142.  Lactate lipase normal.  WBC 13, hemoglobin hematocrit at baseline, platelets 112.  UA negative for urinary tract infection.  CT abdomen pelvis revealed mid small bowel dilation worrisome for partial small bowel obstruction.  Dr. Isaac agreed to consult hospital service asked to admit.  Edited by: Milton Urban DO at 2023 1584     Review of Systems:     All systems were reviewed and negative except as mentioned in subjective, assessment and plan.    Vital Signs:    Temp:  [97.6 øF (36.4 øC)-98.7 øF (37.1 øC)] 97.6 øF (36.4 øC)  Heart Rate:  [] 133  Resp:  [18-22] 18  BP: (108-148)/(59-85)  148/75    Intake and output:    I/O last 3 completed shifts:  In: 696.3 [P.O.:40; I.V.:656.3]  Out: 400 [Urine:400]  I/O this shift:  In: -   Out: 100 [Urine:100]    Physical Examination:    Physical Exam  Vitals reviewed.   Constitutional:       Appearance: Normal appearance.   HENT:      Head: Normocephalic and atraumatic.      Right Ear: External ear normal.      Left Ear: External ear normal.      Mouth/Throat:      Mouth: Mucous membranes are moist.      Pharynx: Oropharynx is clear.   Eyes:      Extraocular Movements: Extraocular movements intact.      Conjunctiva/sclera: Conjunctivae normal.   Cardiovascular:      Rate and Rhythm: Normal rate and regular rhythm.      Pulses: Normal pulses.      Heart sounds: Normal heart sounds.   Pulmonary:      Effort: Pulmonary effort is normal.      Breath sounds: Normal breath sounds.   Abdominal:      General: Bowel sounds are normal. There is no distension. Firmness in RLQ chronic     Comments: Only slight tenderness to palpation   Musculoskeletal:         General: Normal range of motion.      Right lower leg: No edema.      Left lower leg: No edema.   Skin:     General: Skin is warm and dry.   Neurological:      Mental Status: She is alert and oriented to person, place, and time. Mental status is at baseline.   Psychiatric:         Behavior: Behavior normal  Edited by: Milton Urban DO at 8/8/2023 1526     Laboratory results:    Results from last 7 days   Lab Units 08/08/23  0523 08/07/23  1535   SODIUM mmol/L 146* 145   POTASSIUM mmol/L 4.0 4.4   CHLORIDE mmol/L 104 102   CO2 mmol/L 35.8* 34.8*   BUN mg/dL 24* 23   CREATININE mg/dL 0.55* 0.65   CALCIUM mg/dL 8.7 9.2   BILIRUBIN mg/dL  --  0.3   ALK PHOS U/L  --  59   ALT (SGPT) U/L  --  12   AST (SGOT) U/L  --  19   GLUCOSE mg/dL 142* 142*     Results from last 7 days   Lab Units 08/08/23  0523 08/07/23  1535   WBC 10*3/mm3 11.28* 12.90*   HEMOGLOBIN g/dL 8.5* 8.8*   HEMATOCRIT % 31.8* 33.9*   PLATELETS  10*3/mm3 160 112*                 Recent Labs     11/21/22  1027 01/23/23  0050 05/22/23  1843   PHART 7.443 7.334* 7.318*   EWB4UVH 47.0* 67.6* 74.3*   PO2ART 68.9* 156.0* 69.2*   [id number] 32.1* 36.0* 38.1*   BASEEXCESS 7.1* 8.7* 10.6*      I have reviewed the patient's laboratory results.    Radiology results:    CT Abdomen Pelvis With Contrast    Result Date: 8/7/2023  FINAL REPORT TECHNIQUE: Routine axial images through the abdomen and pelvis were obtained following IV contrast administration. CLINICAL HISTORY: lower abd pain cramps, n/v, constipation, h/o obstruction feels similar COMPARISON: 3/22/2023 FINDINGS: Abdomen: The gallbladder has been removed.  There is no change in the 2.9 cm left adrenal nodule.  In the absence of known malignancy this is most likely an adenoma.  Follow-up CT in 9 - 12 monhs to confirm stability is recommended.  There are bilateral renal cysts.  The solid abdominal organs and ureters are otherwise unremarkable.  There are new moderately dilated multiple loops of mid small bowel.  The distal small bowel is normal in caliber and a partial bowel obstruction is not excluded.  The site and etiology of the obstruction is not apparent.  There are postoperative changes of the cecum.  The GI tract is otherwise unremarkable.  The appendix is not visualized and may have been removed. Pelvis: The urinary bladder is unremarkable.  Uterus and ovaries are absent.  There is no pelvic or abdominal ascites, adenopathy or acute osseous abnormality.     Impression: Mid small bowel dilation worrisome for partial bowel obstruction of uncertain etiology. Stable adrenal nodule, consider follow-up CT to confirm stability. Authenticated and Electronically Signed by Fidel Knapp M.D. on 08/07/2023 07:32:37 PM    XR Abdomen KUB    Result Date: 8/8/2023  PROCEDURE: XR ABDOMEN KUB-  HISTORY: NG TUBE PLACEMENT; K56.600-Partial intestinal obstruction, unspecified as to cause; I48.91-Unspecified atrial fibrillation   COMPARISON: None.  FINDINGS: Single view of the abdomen demonstrates a nasogastric tube seen coursing below the diaphragm with the tip terminating in the distal stomach/proximal duodenum. There is minimal right basilar atelectasis or scarring. The heart is normal in size.      Impression: NG tube coursing below diaphragm with the tip terminating the distal stomach/proximal duodenum.     Images were reviewed, interpreted, and dictated by Dr. Angeles Collins MD Transcribed by Mickey Perez PA-C.  This report was signed and finalized on 8/8/2023 8:18 AM by Angeles Collins MD.     I have reviewed the patient's radiology reports.    Medication Review:     I have reviewed the patient's active and prn medications.     Problem List:      Partial small bowel obstruction      Assessment/Plan:    Partial small bowel obstruction.    History of colectomy due to obstruction in 2018  Paroxysmal A-fib with RVR   Hypertension  Hyperlipidemia  Depression/anxiety  GERD    -NG tube in place  -Gastrografin challenge per Dr. Isaac  - added prn lopressor for RVR, Eliquis held  -Ordered fluid bolus and topical spray for dry mouth        Edited by: Milton Urban DO at 8/8/2023 1534       DVT Prophylaxis: Lovenox  Code Status:   Code Status and Medical Interventions:   Ordered at: 08/07/23 2023     Code Status (Patient has no pulse and is not breathing):    CPR (Attempt to Resuscitate)     Medical Interventions (Patient has pulse or is breathing):    Full Support     Release to patient:    Routine Release      Diet:   Dietary Orders (From admission, onward)       Start     Ordered    08/07/23 2023  NPO Diet NPO Type: Strict NPO  Diet Effective Now        Question:  NPO Type  Answer:  Strict NPO    08/07/23 2023                   Discharge Plan: Disposition: home at [place]    Milton Urban DO  08/08/23  15:34 EDT    Dictated utilizing Dragon dictation.

## 2023-08-08 NOTE — PLAN OF CARE
Goal Outcome Evaluation:  Plan of Care Reviewed With: patient        Progress: no change  Outcome Evaluation: New admit overnight. Patient's pain has been well controlled. no episodes of nausea or vomiting overnight. No bowel movements but still passing kostas. Plan of care ongoing.

## 2023-08-08 NOTE — CONSULTS
Referring Provider: No ref. provider found    Reason for Consultation: Abdominal pain, partial obstruction    Patient Care Team:  Paul Quinteros MD as PCP - General (Internal Medicine)  Sima Moses MD as Consulting Physician (General Surgery)  Taiwo Curry MD as Consulting Physician (Cardiology)  Soledad Stauffer, RN as Ambulatory  ([facility])    Chief complaint   Chief Complaint   Patient presents with    Abdominal Pain       SUBJECTIVE:    History of present illness:  Patient is a 78 year old chronically ill female with PMH HTN, HLD, CAD, chronic pain on narcotics, depression/anxiety, colectomy in 2018 secondary to obstruction who presented to ED with 3 days of lower abdominal pain that was progressively worsening. Pain is now diffuse. Patient states that she also feels weak and nauseous. She has not vomited. Patient has chronic constipation and states that her last bowel movement was 3 days ago. She reports continuing to pass flatus. Nothing has made her pain better or worse.     Review of Systems:    Review of Systems - General ROS: negative for - chills, fatigue, fever, hot flashes, malaise or night sweats  Psychological ROS: negative for - Depression or anxiety  HEENT ROS: negative for -  No nasal/oral pharynx drainage or pain. No acute visual complaints.  Respiratory ROS: negative for - Shortness of breath, cough or hemoptysis.  Cardiovascular ROS: negative for - Chest pain or palpitations. No edema  Gastrointestinal ROS: positive for abdominal pain and nausea  Genito-Urinary ROS: negative for - dysuria or hematuria  Musculoskeletal ROS: negative for - gait disturbance or muscle pain  Neurological ROS: negative for - dizziness, gait disturbance, memory loss, numbness/tingling or seizures  Skin: no rash    History  Past Medical History:   Diagnosis Date    Adrenal adenoma     Anemia     Arrhythmia     Asthma     Atrial fibrillation     Back pain     Benign colonic polyp      Benign tumor of adrenal gland     Cataract     bilateral    Cholelithiasis     Chronic bronchitis     Chronic bronchitis with COPD (chronic obstructive pulmonary disease)     COPD (chronic obstructive pulmonary disease) 2005    Coronary artery disease     Depression     Diverticulosis Years ago    Elevated cholesterol     Environmental and seasonal allergies     Fibromyalgia     Fibromyalgia, primary Sometime in the 90s    Gastritis     Generalized anxiety disorder     GERD (gastroesophageal reflux disease)     H/O mammogram     Headache Years ago    Hemorrhoids     History of blood transfusion 1985    History of blood transfusion 1985    History of echocardiogram     History of endometriosis     History of nuclear stress test     Hospitalization or health care facility admission within last 6 months     5 times between 11/2022-05/2023    Hypertension     IBS (irritable bowel syndrome)     Impaired functional mobility, balance, gait, and endurance     Impaired mobility     Inverted nipple     Kidney disease     Kidney stone     Liver cyst     Low back pain Years ago    Nodular radiologic density     Nodule of left lung     On home oxygen therapy     2 liters NC QHS    Osteoarthritis     Osteopenia Several years ago    Osteoporosis     PONV (postoperative nausea and vomiting)     Renal cyst     Sinus problem     2014    Sinusitis     Skin cancer     basal cell carcinoma    SOB (shortness of breath)     Tobacco use     Urinary frequency     Urinary tract infection Years ago    Frequent UTIs    Vitamin D deficiency     Wears glasses     Wears partial dentures     upper plate       Past Surgical History:   Procedure Laterality Date    APPENDECTOMY  1980s    CARDIAC CATHETERIZATION  2003    CATARACT EXTRACTION  2013    both eyes    CHOLECYSTECTOMY  2020    CHOLECYSTECTOMY WITH INTRAOPERATIVE CHOLANGIOGRAM N/A 10/30/2020    Procedure: CHOLECYSTECTOMY LAPAROSCOPIC INTRAOPERATIVE CHOLANGIOGRAPHY;  Surgeon: Sima Moses,  MD;  Location: [facility] OR;  Service: General;  Laterality: N/A;    COLON SURGERY      COLONOSCOPY  2013    COLONOSCOPY  2016    COLONOSCOPY N/A 2019    Procedure: COLONOSCOPY W/ COLD FORCEP POLYPECTOMIES; HOT SNARE POLYPECTOMIES; COLD SNARE POLYPECTOMY;  Surgeon: Goyo Nunez MD;  Location: [facility] ENDOSCOPY;  Service: Gastroenterology    COLONOSCOPY W/ BIOPSIES AND POLYPECTOMY      EXPLORATORY LAPAROTOMY N/A 2020    Procedure: colectomy, right, closure of enterotomy x 2, reduction of internal volvulus;  Surgeon: Sima Moses MD;  Location: [facility] OR;  Service: General;  Laterality: N/A;    HYSTERECTOMY      partial    LYSIS OF ABDOMINAL ADHESIONS      TONSILLECTOMY      UPPER GASTROINTESTINAL ENDOSCOPY  2015    VAGINAL DELIVERY      x2       Family History   Problem Relation Age of Onset    Stomach cancer Brother     Colon cancer Maternal Aunt     Brain cancer Father     Arthritis Father     Hypertension Father     Cancer Father         Father, brother, and aunt    Lung cancer Paternal Grandfather     Heart disease Mother         Mother passed away due to heart disease    Breast cancer Neg Hx     Ovarian cancer Neg Hx        Social History     Socioeconomic History    Marital status:    Tobacco Use    Smoking status: Former     Packs/day: 0.25     Years: 30.00     Pack years: 7.50     Types: Cigarettes     Quit date: 2020     Years since quittin.7     Passive exposure: Past    Smokeless tobacco: Never   Vaping Use    Vaping Use: Never used   Substance and Sexual Activity    Alcohol use: No    Drug use: No    Sexual activity: Not Currently     Birth control/protection: Post-menopausal       Allergies   Allergen Reactions    Doxycycline GI Intolerance       OBJECTIVE:    Medications  Current Facility-Administered Medications   Medication Dose Route Frequency Provider Last Rate Last Admin    acetaminophen (TYLENOL) tablet 650 mg  650 mg Oral Q4H PRN  George Durant DO        Or    acetaminophen (TYLENOL) 160 MG/5ML solution 650 mg  650 mg Oral Q4H PRN George Durant DO        Or    acetaminophen (TYLENOL) suppository 650 mg  650 mg Rectal Q4H PRN George Durant DO        sennosides-docusate (PERICOLACE) 8.6-50 MG per tablet 2 tablet  2 tablet Oral BID George Durant DO        And    polyethylene glycol (MIRALAX) packet 17 g  17 g Oral Daily PRN George Durant DO        And    bisacodyl (DULCOLAX) EC tablet 5 mg  5 mg Oral Daily PRN George Durant DO        And    bisacodyl (DULCOLAX) suppository 10 mg  10 mg Rectal Daily PRN George Durant DO        dilTIAZem (CARDIZEM) injection 20 mg  20 mg Intravenous Once Alberto Cox MD        Enoxaparin Sodium (LOVENOX) syringe 70 mg  1 mg/kg Subcutaneous Q12H George Durant DO   70 mg at 08/08/23 0830    ipratropium-albuterol (DUO-NEB) nebulizer solution 1.5 mL  1.5 mL Nebulization Q4H PRN George Durant DO        ketorolac (TORADOL) injection 15 mg  15 mg Intravenous Q6H PRN George Durant DO   15 mg at 08/08/23 0129    LORazepam (ATIVAN) injection 1 mg  1 mg Intravenous Q6H PRN George Durant DO   1 mg at 08/08/23 0641    melatonin tablet 5 mg  5 mg Oral Nightly PRN George Durant DO        metoprolol tartrate (LOPRESSOR) injection 2.5 mg  2.5 mg Intravenous Q6H George Durant DO   2.5 mg at 08/08/23 0830    morphine injection 2 mg  2 mg Intravenous Q2H PRN Bianca Isaac DO        Morphine sulfate (PF) injection 1 mg  1 mg Intravenous Q4H PRN George Durant DO   1 mg at 08/08/23 0830    And    naloxone (NARCAN) injection 0.4 mg  0.4 mg Intravenous Q5 Min PRN George Durant DO        nitroglycerin (NITROSTAT) SL tablet 0.4 mg  0.4 mg Sublingual Q5 Min PRN George Durant DO        ondansetron (ZOFRAN) injection 4 mg  4 mg Intravenous Q6H PRN George Durant DO   4 mg at 08/08/23 0446    Pharmacy to Dose enoxaparin (LOVENOX)   Does not apply Continuous PRN George Durant, DO         prochlorperazine (COMPAZINE) injection 5 mg  5 mg Intravenous Q6H PRN George Durant, DO   5 mg at 08/08/23 0640    Or    prochlorperazine (COMPAZINE) tablet 5 mg  5 mg Oral Q6H PRN George Durant, DO        Or    prochlorperazine (COMPAZINE) suppository 25 mg  25 mg Rectal Q12H PRN George Durant, DO        sodium chloride 0.9 % flush 10 mL  10 mL Intravenous PRN Alberto Cox MD        sodium chloride 0.9 % flush 10 mL  10 mL Intravenous Q12H George Durant, DO   10 mL at 08/08/23 0832    sodium chloride 0.9 % flush 10 mL  10 mL Intravenous PRN George Durant, DO        sodium chloride 0.9 % infusion 40 mL  40 mL Intravenous PRN George Durant, DO        sodium chloride 0.9 % infusion  75 mL/hr Intravenous Continuous George Durant, DO 75 mL/hr at 08/08/23 0654 75 mL/hr at 08/08/23 0654         Vital Signs   Temp:  [97.6 øF (36.4 øC)-98.7 øF (37.1 øC)] 97.8 øF (36.6 øC)  Heart Rate:  [67-95] 77  Resp:  [17-22] 18  BP: (108-136)/(59-96) 122/64    Physical Exam:     General Appearance:  Alert, ill appearing, appears uncomfortable.   Head:  Atraumatic and normocephalic, without obvious abnormality.   Eyes:          PERRLA, conjunctivae and sclerae normal, no Icterus. No pallor. Extraocular movements are within normal limits.   Ears:  Ears appear intact with no abnormalities noted.   Throat: No oral lesions, no thrush, oral mucosa moist.   Neck: Supple, trachea midline, no thyromegaly, no carotid bruit.   Back:   No kyphoscoliosis present. No tenderness to palpation,   range of motion normal.   Respiratory/Lungs:   Breath sounds heard bilaterally equally.  No crackles or wheezing. No Pleural rub or bronchial breathing. Normal respiratory effort.    Cardiovascular/Heart:  Normal S1 and S2, no murmur. No edema   GI/Abdomen:   Soft, diffusely tender to palpation, mildly distended, hypoactive bowel sounds, non-peritoneal               Musculoskeletal/ Extremities:   Moves all extremities well    Pulses: Pulses palpable and equal bilaterally   Skin: No bleeding, bruising or rash, no induration   Lymph nodes: No palpable adenopathy   Psychiatric : Alert and oriented x3.  No depression or anxiety    Neurologic: Cranial nerves 2 - 12 grossly intact, sensation intact, Motor power is normal and equal bilaterally.     Results Review:  Lab Results (last 24 hours)       Procedure Component Value Units Date/Time    Basic Metabolic Panel [[id number]]  (Abnormal) Collected: 08/08/23 0523    Specimen: Blood Updated: 08/08/23 0618     Glucose 142 mg/dL      BUN 24 mg/dL      Creatinine 0.55 mg/dL      Sodium 146 mmol/L      Potassium 4.0 mmol/L      Chloride 104 mmol/L      CO2 35.8 mmol/L      Calcium 8.7 mg/dL      BUN/Creatinine Ratio 43.6     Anion Gap 6.2 mmol/L      eGFR 95.1 mL/min/1.73     Narrative:      GFR Normal >60  Chronic Kidney Disease <60  Kidney Failure <15    The GFR formula is only valid for adults with stable renal function between ages 18 and 70.    Scan Slide [[id number]] Collected: 08/08/23 0523    Specimen: Blood Updated: 08/08/23 0618     Hypochromia Slight/1+     WBC Morphology Normal     Platelet Estimate Adequate    CBC Auto Differential [[id number]]  (Abnormal) Collected: 08/08/23 0523    Specimen: Blood Updated: 08/08/23 0617     WBC 11.28 10*3/mm3      RBC 3.87 10*6/mm3      Hemoglobin 8.5 g/dL      Hematocrit 31.8 %      MCV 82.2 fL      MCH 22.0 pg      MCHC 26.7 g/dL      RDW 18.7 %      RDW-SD 55.4 fl      MPV 11.4 fL      Platelets 160 10*3/mm3      Neutrophil % 93.2 %      Lymphocyte % 3.1 %      Monocyte % 2.9 %      Eosinophil % 0.1 %      Basophil % 0.4 %      Immature Grans % 0.3 %      Neutrophils, Absolute 10.52 10*3/mm3      Lymphocytes, Absolute 0.35 10*3/mm3      Monocytes, Absolute 0.33 10*3/mm3      Eosinophils, Absolute 0.01 10*3/mm3      Basophils, Absolute 0.04 10*3/mm3      Immature Grans, Absolute 0.03 10*3/mm3      nRBC 0.0 /100 WBC     Urinalysis, Microscopic Only -  Urine, Clean Catch [[id number]]  (Abnormal) Collected: 08/07/23 1817    Specimen: Urine, Clean Catch Updated: 08/07/23 1847     RBC, UA None Seen /HPF      WBC, UA 3-5 /HPF      Bacteria, UA 1+ /HPF      Squamous Epithelial Cells, UA 0-2 /HPF      Hyaline Casts, UA None Seen /LPF      Mucus, UA Large/3+ /HPF      Methodology Manual Light Microscopy    Urinalysis With Microscopic If Indicated (No Culture) - Urine, Clean Catch [[id number]]  (Abnormal) Collected: 08/07/23 1817    Specimen: Urine, Clean Catch Updated: 08/07/23 1835     Color, UA Yellow     Appearance, UA Clear     pH, UA 6.0     Specific Gravity, UA >1.030     Glucose, UA Negative     Ketones, UA Negative     Bilirubin, UA Negative     Blood, UA Negative     Protein, UA 30 mg/dL (1+)     Leuk Esterase, UA Negative     Nitrite, UA Negative     Urobilinogen, UA 0.2 E.U./dL    [place] Draw [[id number]] Collected: 08/07/23 1535    Specimen: Blood Updated: 08/07/23 1646    Narrative:      The following orders were created for panel order [place] Draw.  Procedure                               Abnormality         Status                     ---------                               -----------         ------                     Green Top (Gel)[[id number]]                                  Final result               Lavender Top[[id number]]                                     Final result               Gold Top - SST[[id number]]                                   Final result               Light Blue Top[[id number]]                                   Final result                 Please view results for these tests on the individual orders.    Gold Top - SST [[id number]] Collected: 08/07/23 1535    Specimen: Blood Updated: 08/07/23 1646     Extra Tube Hold for add-ons.     Comment: Auto resulted.       Light Blue Top [[id number]] Collected: 08/07/23 1535    Specimen: Blood Updated: 08/07/23 1646     Extra Tube Hold for add-ons.     Comment: Auto resulted       Green Top (Gel)  [[id number]] Collected: 08/07/23 1535    Specimen: Blood Updated: 08/07/23 1646     Extra Tube Hold for add-ons.     Comment: Auto resulted.       Lavender Top [[id number]] Collected: 08/07/23 1535    Specimen: Blood Updated: 08/07/23 1646     Extra Tube hold for add-on     Comment: Auto resulted       CBC & Differential [[id number]]  (Abnormal) Collected: 08/07/23 1535    Specimen: Blood Updated: 08/07/23 1607    Narrative:      The following orders were created for panel order CBC & Differential.  Procedure                               Abnormality         Status                     ---------                               -----------         ------                     CBC Auto Differential[[id number]]        Abnormal            Final result               Scan Slide[[id number]]                                       Final result                 Please view results for these tests on the individual orders.    Scan Slide [[id number]] Collected: 08/07/23 1535    Specimen: Blood Updated: 08/07/23 1607     Hypochromia Slight/1+     Stomatocytes Mod/2+     WBC Morphology Normal     Clumped Platelets Present    CBC Auto Differential [[id number]]  (Abnormal) Collected: 08/07/23 1535    Specimen: Blood Updated: 08/07/23 1603     WBC 12.90 10*3/mm3      RBC 4.17 10*6/mm3      Hemoglobin 8.8 g/dL      Hematocrit 33.9 %      MCV 81.3 fL      MCH 21.1 pg      MCHC 26.0 g/dL      RDW 19.1 %      RDW-SD 55.8 fl      MPV --     Comment: Unable to calculate        Platelets 112 10*3/mm3      Neutrophil % 84.5 %      Lymphocyte % 9.6 %      Monocyte % 4.7 %      Eosinophil % 0.4 %      Basophil % 0.4 %      Immature Grans % 0.4 %      Neutrophils, Absolute 10.90 10*3/mm3      Lymphocytes, Absolute 1.24 10*3/mm3      Monocytes, Absolute 0.61 10*3/mm3      Eosinophils, Absolute 0.05 10*3/mm3      Basophils, Absolute 0.05 10*3/mm3      Immature Grans, Absolute 0.05 10*3/mm3      nRBC 0.0 /100 WBC     Comprehensive Metabolic Panel [[id number]]   (Abnormal) Collected: 08/07/23 1535    Specimen: Blood Updated: 08/07/23 1602     Glucose 142 mg/dL      BUN 23 mg/dL      Creatinine 0.65 mg/dL      Sodium 145 mmol/L      Potassium 4.4 mmol/L      Comment: Slight hemolysis detected by analyzer. Results may be affected.        Chloride 102 mmol/L      CO2 34.8 mmol/L      Calcium 9.2 mg/dL      Total Protein 6.9 g/dL      Albumin 4.3 g/dL      ALT (SGPT) 12 U/L      AST (SGOT) 19 U/L      Comment: Slight hemolysis detected by analyzer. Results may be affected.        Alkaline Phosphatase 59 U/L      Total Bilirubin 0.3 mg/dL      Globulin 2.6 gm/dL      A/G Ratio 1.7 g/dL      BUN/Creatinine Ratio 35.4     Anion Gap 8.2 mmol/L      eGFR 91.4 mL/min/1.73     Narrative:      GFR Normal >60  Chronic Kidney Disease <60  Kidney Failure <15    The GFR formula is only valid for adults with stable renal function between ages 18 and 70.    Lipase [[id number]]  (Normal) Collected: 08/07/23 1535    Specimen: Blood Updated: 08/07/23 1600     Lipase 23 U/L     Lactic Acid, Plasma [[id number]]  (Normal) Collected: 08/07/23 1535    Specimen: Blood Updated: 08/07/23 1556     Lactate 1.0 mmol/L             ASSESSMENT/PLAN:      Partial small bowel obstruction    Patient is a 78 year old female with history of colectomy secondary to obstruction in 2018. She presented to the ED yesterday evening with abdominal pain that felt similar to previous obstruction. CT abdomen and pelvis was completed and demonstrates dilation of the mid small bowel with concern for partial obstruction. Initially patient had active bowel sounds and a relatively benign abdomen, however, overnight she developed worsening pain, nausea, and diminished bowel sounds so an NG was placed for decompression at 7AM. Currently patient reports passing flatus and still has hypoactive bowel sounds. We will continue to decompress her until this afternoon and then proceed with a gastrograffin challenge. I encouraged patient to  ambulate as much as possible. I have also added scheduled morphine as patient reports being on narcotics 4 times daily at home. She understands the plan and all questions were answered.     I discussed the patients findings and my recommendations with patient    Bianca Isaac DO  08/08/23  10:12 EDT

## 2023-08-08 NOTE — CASE MANAGEMENT/SOCIAL WORK
Discharge Planning Assessment  [facility]     Patient Name: Gabi Dudley  MRN: [id number]  Today's Date: 8/8/2023    Admit Date: 8/7/2023    Plan: Plans to return home with daughter. Has own transportation.   Discharge Needs Assessment       Row Name 08/08/23 1435       Living Environment    People in Home child(ashely), adult    Name(s) of People in Home Farida/Daughter    Current Living Arrangements home    Duration at Residence 13-15 years    Potentially Unsafe Housing Conditions unable to assess    Primary Care Provided by self    Provides Primary Care For no one    Family Caregiver if Needed child(ashely), adult    Family Caregiver Names Malu/Daughter    Quality of Family Relationships unable to assess    Able to Return to Prior Arrangements yes       Resource/Environmental Concerns    Resource/Environmental Concerns none    Transportation Concerns none       Food Insecurity    Within the past 12 months, you worried that your food would run out before you got the money to buy more. Never true    Within the past 12 months, the food you bought just didn't last and you didn't have money to get more. Never true       Transition Planning    Patient/Family Anticipates Transition to home with family    Patient/Family Anticipated Services at Transition     Transportation Anticipated family or friend will provide       Discharge Needs Assessment    Readmission Within the Last 30 Days no previous admission in last 30 days    Concerns to be Addressed denies needs/concerns at this time    Concerns Comments Daughter stated No needs/services at this time.    Anticipated Changes Related to Illness inability to care for self    Equipment Needed After Discharge none    Provided Post Acute Provider List? N/A    Current Discharge Risk chronically ill;cognitively impaired                   Discharge Plan       Row Name 08/08/23 1438       Plan    Plan Plans to return home with daughter. Has own transportation.     Roadmap to Recovery Yes    Patient/Family in Agreement with Plan yes    Provided Post Acute Provider List? N/A    Provided Post Acute Provider Quality & Resource List? N/A    Plan Comments CM spoke with pt. and daughter at bedside to discuss DCP. Daughter confirmed name, , address, insurance and phone numbers. No LW and No POA. PCP confirmed last seen 2 weeks ago. Pharmacy used is [facility]. Daughter agreed to meds to bed. Daughter states (I) with most ADL's and (I) with mobility uses a rollator with all mobility.  Equipment at home= 02/[employer]erocare, Trilogy/[employer]iem[employer], Nebs, Resp. Supplies, Shower chair, BSC, Pulse Oximeter at home. Plans to DC back home with daughter. Daughter Denies any needs/services at this time.    Final Discharge Disposition Code 01 - home or self-care                  Continued Care and Services - Admitted Since 2023    Coordination has not been started for this encounter.       Selected Continued Care - Episodes Includes continued care and service providers with selected services from the active episodes listed below      High Risk Care Management Episode start date: 2023   There are no active outsourced providers for this episode.                 Selected Continued Care - Prior Encounters Includes continued care and service providers with selected services from prior encounters from 2023 to 2023      Discharged on 2023 Admission date: 2023 - Discharge disposition: Skilled Nursing Facility (DC - External)      Destination       Service Provider Selected Services Address Phone Fax Patient Preferred    [facility] [address] [address] [phone] --              Durable Medical Equipment       Service Provider Selected Services Address Phone Fax Patient Preferred    [facility] [facility] -- [phone] --       Internal Comment last updated by  Malu Holden, APRN 5/5/2023 1518    Supplies pt NIV               MARIELENA  [employer]UL[employer] [address] [place] [address] [phone] --       Internal Comment last updated by Malu Holden, APRN 5/5/2023 1519    Supplies pt O2                                     Expected Discharge Date and Time       Expected Discharge Date Expected Discharge Time    Aug 9, 2023            Demographic Summary       Row Name 08/08/23 1253       General Information    Admission Type inpatient    Arrived From emergency department    Required Notices Provided Observation Status Notice    Referral Source admission list    Reason for Consult discharge planning    Preferred Language English       Contact Information    Permission Granted to Share Info With     Contact Information Obtained for                    Functional Status       Row Name 08/08/23 1254       Functional Status    Usual Activity Tolerance poor    Current Activity Tolerance poor       Physical Activity    On average, how many days per week do you engage in moderate to strenuous exercise (like a brisk walk)? 0 days    On average, how many minutes do you engage in exercise at this level? 0 min    Number of minutes of exercise per week 0       Assessment of Health Literacy    How often do you have someone help you read hospital materials? Always    How often do you have problems learning about your medical condition because of difficulty understanding written information? Often    How often do you have a problem understanding what is told to you about your medical condition? Often    How confident are you filling out medical forms by yourself? Not at all    Health Literacy Low       Functional Status, IADL    Medications assistive person    Meal Preparation assistive person    Housekeeping assistive person    Laundry assistive person    Shopping assistive person    IADL Comments Daughter  states she does some meal preps by herself. Needs (A) with other functions.       Mental Status    General Appearance WDL WDL       Mental Status Summary    Recent Changes in Mental Status/Cognitive Functioning no changes       Employment/    Employment Status disabled                   Psychosocial       Row Name 08/08/23 9505       Developmental Stage (Eriksson's)    Developmental Stage Stage 8 (65 years-death/Late Adulthood) Integrity vs. Despair                   Abuse/Neglect    No documentation.                  Legal    No documentation.                  Substance Abuse    No documentation.                  Patient Forms    No documentation.                     Ashlyn Palacio RN

## 2023-08-08 NOTE — PLAN OF CARE
Goal Outcome Evaluation:  Plan of Care Reviewed With: patient              Pt tolerating treatments well throughout shift. She received contrast via NG tube this shift for CT this evening. Pt tolerated contrast well with no N/V. No new onset pain or distyress throughout shift. Pt continues to have increased HR with Lopressor PRN dose given at 1545. PRN dose Lopressor effective, however.

## 2023-08-08 NOTE — PROGRESS NOTES
"Adult Nutrition  Assessment/PES    Patient Name:  Gabi Dudley  YOB: 1945  MRN: [id number]  Admit Date:  8/7/2023    Assessment Date:  8/8/2023    Comments:     Pt currently NPO d/t partial small bowel obstruction - will order Boost Breeze BID once diet advances. RD to follow-up and available PRN.      Reason for Assessment       Row Name 08/08/23 1051          Reason for Assessment    Reason For Assessment identified at risk by screening criteria;per organizational policy     Diagnosis gastrointestinal disease     Identified At Risk by Screening Criteria difficulty chewing/swallowing;MST SCORE 2+                      Labs/Tests/Procedures/Meds       Row Name 08/08/23 1052          Labs/Procedures/Meds    Lab Results Reviewed reviewed, pertinent     Lab Results Comments High: Na, BUN  Low: Cr        Medications    Pertinent Medications Reviewed reviewed, pertinent     Pertinent Medications Comments docusate, lovenox                    Physical Findings       Row Name 08/08/23 1102          Physical Findings    Overall Physical Appearance normal wt for age                    Estimated/Assessed Needs - Anthropometrics       Row Name 08/08/23 1103 08/08/23 0500       Anthropometrics    Weight -- 72.7 kg (160 lb 4.4 oz)    Height for Calculation 1.651 m (5' 5\") --    Weight for Calculation 72.6 kg (160 lb) --       Estimated/Assessed Needs    Additional Documentation Estimated Calorie Needs (Group);KCAL/KG (Group);Protein Requirements (Group);Fluid Requirements (Group) --       Estimated Calorie Needs    Estimated Calorie Requirement (kcal/day) 1814 --       KCAL/KG    KCAL/KG 30 Kcal/Kg (kcal);25 Kcal/Kg (kcal) --    25 Kcal/Kg (kcal) 1814.4 --    30 Kcal/Kg (kcal) 2177.28 --       Protein Requirements    Weight Used For Protein Calculations 72.6 kg (160 lb) --    Est Protein Requirement Amount (gms/kg) 1.0 gm protein --    Estimated Protein Requirements (gms/day) 72.58 --       Fluid Requirements    " Fluid Requirements (mL/day) 1814  1mL/kcal --    RDA Method (mL) 1814 --                   Nutrition Prescription Ordered       Row Name 08/08/23 1103          Nutrition Prescription PO    Current PO Diet NPO                    Evaluation of Received Nutrient/Fluid Intake       Row Name 08/08/23 1104          Intake Assessment    Energy/Calorie Requirement Assessment not meeting needs     Protein Requirement Assessment not meeting needs        PO Evaluation    Number of Days PO Intake Evaluated Insufficient Data                       Problem/Interventions:   Problem 1       Row Name 08/08/23 1104          Nutrition Diagnoses Problem 1    Problem 1 Altered GI Function     Etiology (related to) Medical Diagnosis     Gastrointestinal Constipation;SBO     Signs/Symptoms (evidenced by) NPO                          Intervention Goal       Row Name 08/08/23 1104          Intervention Goal    General Maintain nutrition;Reduce/improve symptoms;Improved nutrition related lab(s);Meet nutritional needs for age/condition;Disease management/therapy     PO Advance diet;Establish PO     Weight Maintain weight                    Nutrition Intervention       Row Name 08/08/23 1104          Nutrition Intervention    RD/Tech Action Await begin PO;Follow Tx progress;Care plan reviewd;Encourage intake;Recommend/ordered     Recommended/Ordered Supplement                    Nutrition Prescription       Row Name 08/08/23 1105          Nutrition Prescription PO    PO Prescription Begin/change supplement     Supplement Boost Breeze     Supplement Frequency 2 times a day     New PO Prescription Ordered? Yes        Other Orders    Obtain Weight Daily     Obtain Weight Ordered? No, recommended     Supplement Vitamin mineral supplement     Supplement Ordered? No, recommended     Labs Mg++;Na+;K+;Phos     Labs Ordered? No, recommended                    Education/Evaluation       Row Name 08/08/23 1105          Education    Education Will Instruct  as appropriate        Monitor/Evaluation    Monitor Per protocol;PO intake;Supplement intake;Pertinent labs;Weight;Skin status                     Electronically signed by:  Sergey Arredondo RD  08/08/23 11:05 EDT

## 2023-08-09 ENCOUNTER — ANESTHESIA EVENT CONVERTED (OUTPATIENT)
Dept: ANESTHESIOLOGY | Facility: HOSPITAL | Age: 78
DRG: 329 | End: 2023-08-09
Payer: MEDICARE

## 2023-08-09 ENCOUNTER — ANESTHESIA (OUTPATIENT)
Dept: PERIOP | Facility: HOSPITAL | Age: 78
DRG: 329 | End: 2023-08-09
Payer: MEDICARE

## 2023-08-09 ENCOUNTER — APPOINTMENT (OUTPATIENT)
Dept: GENERAL RADIOLOGY | Facility: HOSPITAL | Age: 78
DRG: 329 | End: 2023-08-09
Payer: MEDICARE

## 2023-08-09 ENCOUNTER — ANESTHESIA (OUTPATIENT)
Dept: ICU | Facility: HOSPITAL | Age: 78
DRG: 329 | End: 2023-08-09
Payer: MEDICARE

## 2023-08-09 ENCOUNTER — ANESTHESIA EVENT (OUTPATIENT)
Dept: PERIOP | Facility: HOSPITAL | Age: 78
DRG: 329 | End: 2023-08-09
Payer: MEDICARE

## 2023-08-09 LAB
ABO GROUP BLD: NORMAL
ABO GROUP BLD: NORMAL
ANION GAP SERPL CALCULATED.3IONS-SCNC: 5.1 MMOL/L (ref 5–15)
BLD GP AB SCN SERPL QL: NEGATIVE
BUN SERPL-MCNC: 29 MG/DL (ref 8–23)
BUN/CREAT SERPL: 53.7 (ref 7–25)
CALCIUM SPEC-SCNC: 8.1 MG/DL (ref 8.6–10.5)
CHLORIDE SERPL-SCNC: 103 MMOL/L (ref 98–107)
CO2 SERPL-SCNC: 37.9 MMOL/L (ref 22–29)
CREAT SERPL-MCNC: 0.54 MG/DL (ref 0.57–1)
D-LACTATE SERPL-SCNC: 0.8 MMOL/L (ref 0.5–2)
DEPRECATED RDW RBC AUTO: 55.3 FL (ref 37–54)
EGFRCR SERPLBLD CKD-EPI 2021: 95.6 ML/MIN/1.73
ERYTHROCYTE [DISTWIDTH] IN BLOOD BY AUTOMATED COUNT: 19 % (ref 12.3–15.4)
GLUCOSE SERPL-MCNC: 115 MG/DL (ref 65–99)
HCT VFR BLD AUTO: 29.4 % (ref 34–46.6)
HGB BLD-MCNC: 7.8 G/DL (ref 12–15.9)
IRON 24H UR-MRATE: 19 MCG/DL (ref 37–145)
IRON SATN MFR SERPL: 4 % (ref 20–50)
MCH RBC QN AUTO: 21.5 PG (ref 26.6–33)
MCHC RBC AUTO-ENTMCNC: 26.5 G/DL (ref 31.5–35.7)
MCV RBC AUTO: 81.2 FL (ref 79–97)
PLATELET # BLD AUTO: 143 10*3/MM3 (ref 140–450)
PMV BLD AUTO: 10.4 FL (ref 6–12)
POTASSIUM SERPL-SCNC: 3.6 MMOL/L (ref 3.5–5.2)
PROCALCITONIN SERPL-MCNC: 0.11 NG/ML (ref 0–0.25)
QT INTERVAL: 426 MS
QTC INTERVAL: 456 MS
RBC # BLD AUTO: 3.62 10*6/MM3 (ref 3.77–5.28)
RH BLD: POSITIVE
RH BLD: POSITIVE
SODIUM SERPL-SCNC: 146 MMOL/L (ref 136–145)
T&S EXPIRATION DATE: NORMAL
TIBC SERPL-MCNC: 472 MCG/DL (ref 298–536)
TRANSFERRIN SERPL-MCNC: 317 MG/DL (ref 200–360)
WBC NRBC COR # BLD: 4.05 10*3/MM3 (ref 3.4–10.8)

## 2023-08-09 PROCEDURE — 25010000002 AMIODARONE IN DEXTROSE 5% 360-4.14 MG/200ML-% SOLUTION: Performed by: INTERNAL MEDICINE

## 2023-08-09 PROCEDURE — 0DN80ZZ RELEASE SMALL INTESTINE, OPEN APPROACH: ICD-10-PCS | Performed by: STUDENT IN AN ORGANIZED HEALTH CARE EDUCATION/TRAINING PROGRAM

## 2023-08-09 PROCEDURE — 25010000002 ONDANSETRON PER 1 MG: Performed by: INTERNAL MEDICINE

## 2023-08-09 PROCEDURE — 85027 COMPLETE CBC AUTOMATED: CPT | Performed by: INTERNAL MEDICINE

## 2023-08-09 PROCEDURE — 25010000002 FENTANYL CITRATE (PF) 100 MCG/2ML SOLUTION: Performed by: NURSE ANESTHETIST, CERTIFIED REGISTERED

## 2023-08-09 PROCEDURE — 86900 BLOOD TYPING SEROLOGIC ABO: CPT

## 2023-08-09 PROCEDURE — 80048 BASIC METABOLIC PNL TOTAL CA: CPT | Performed by: INTERNAL MEDICINE

## 2023-08-09 PROCEDURE — 25010000002 HYDROMORPHONE 1 MG/ML SOLUTION: Performed by: INTERNAL MEDICINE

## 2023-08-09 PROCEDURE — 25010000002 PROPOFOL 200 MG/20ML EMULSION: Performed by: NURSE ANESTHETIST, CERTIFIED REGISTERED

## 2023-08-09 PROCEDURE — 84145 PROCALCITONIN (PCT): CPT | Performed by: STUDENT IN AN ORGANIZED HEALTH CARE EDUCATION/TRAINING PROGRAM

## 2023-08-09 PROCEDURE — 71045 X-RAY EXAM CHEST 1 VIEW: CPT

## 2023-08-09 PROCEDURE — 25010000002 MORPHINE PER 10 MG: Performed by: STUDENT IN AN ORGANIZED HEALTH CARE EDUCATION/TRAINING PROGRAM

## 2023-08-09 PROCEDURE — 94799 UNLISTED PULMONARY SVC/PX: CPT

## 2023-08-09 PROCEDURE — 36561 INSERT TUNNELED CV CATH: CPT | Performed by: STUDENT IN AN ORGANIZED HEALTH CARE EDUCATION/TRAINING PROGRAM

## 2023-08-09 PROCEDURE — 02HV33Z INSERTION OF INFUSION DEVICE INTO SUPERIOR VENA CAVA, PERCUTANEOUS APPROACH: ICD-10-PCS | Performed by: STUDENT IN AN ORGANIZED HEALTH CARE EDUCATION/TRAINING PROGRAM

## 2023-08-09 PROCEDURE — 25010000002 BUPIVACAINE (PF) 0.5 % SOLUTION: Performed by: NURSE ANESTHETIST, CERTIFIED REGISTERED

## 2023-08-09 PROCEDURE — 25010000002 PROCHLORPERAZINE 10 MG/2ML SOLUTION: Performed by: INTERNAL MEDICINE

## 2023-08-09 PROCEDURE — 44055 CORRECT MALROTATION OF BOWEL: CPT | Performed by: STUDENT IN AN ORGANIZED HEALTH CARE EDUCATION/TRAINING PROGRAM

## 2023-08-09 PROCEDURE — 83605 ASSAY OF LACTIC ACID: CPT | Performed by: STUDENT IN AN ORGANIZED HEALTH CARE EDUCATION/TRAINING PROGRAM

## 2023-08-09 PROCEDURE — 99232 SBSQ HOSP IP/OBS MODERATE 35: CPT | Performed by: INTERNAL MEDICINE

## 2023-08-09 PROCEDURE — 25010000002 SUGAMMADEX 200 MG/2ML SOLUTION: Performed by: NURSE ANESTHETIST, CERTIFIED REGISTERED

## 2023-08-09 PROCEDURE — 03HY32Z INSERTION OF MONITORING DEVICE INTO UPPER ARTERY, PERCUTANEOUS APPROACH: ICD-10-PCS | Performed by: STUDENT IN AN ORGANIZED HEALTH CARE EDUCATION/TRAINING PROGRAM

## 2023-08-09 PROCEDURE — P9016 RBC LEUKOCYTES REDUCED: HCPCS

## 2023-08-09 PROCEDURE — 31500 INSERT EMERGENCY AIRWAY: CPT

## 2023-08-09 PROCEDURE — 0DS80ZZ REPOSITION SMALL INTESTINE, OPEN APPROACH: ICD-10-PCS | Performed by: STUDENT IN AN ORGANIZED HEALTH CARE EDUCATION/TRAINING PROGRAM

## 2023-08-09 PROCEDURE — 25010000002 KETOROLAC TROMETHAMINE PER 15 MG: Performed by: INTERNAL MEDICINE

## 2023-08-09 PROCEDURE — 25010000002 AMIODARONE IN DEXTROSE 5% 150-4.21 MG/100ML-% SOLUTION: Performed by: INTERNAL MEDICINE

## 2023-08-09 PROCEDURE — 86920 COMPATIBILITY TEST SPIN: CPT

## 2023-08-09 PROCEDURE — 86900 BLOOD TYPING SEROLOGIC ABO: CPT | Performed by: STUDENT IN AN ORGANIZED HEALTH CARE EDUCATION/TRAINING PROGRAM

## 2023-08-09 PROCEDURE — 86850 RBC ANTIBODY SCREEN: CPT | Performed by: STUDENT IN AN ORGANIZED HEALTH CARE EDUCATION/TRAINING PROGRAM

## 2023-08-09 PROCEDURE — 76937 US GUIDE VASCULAR ACCESS: CPT | Performed by: STUDENT IN AN ORGANIZED HEALTH CARE EDUCATION/TRAINING PROGRAM

## 2023-08-09 PROCEDURE — 25010000002 HYDROCORTISONE SOD SUC (PF) 100 MG RECONSTITUTED SOLUTION: Performed by: NURSE ANESTHETIST, CERTIFIED REGISTERED

## 2023-08-09 PROCEDURE — 86901 BLOOD TYPING SEROLOGIC RH(D): CPT | Performed by: STUDENT IN AN ORGANIZED HEALTH CARE EDUCATION/TRAINING PROGRAM

## 2023-08-09 PROCEDURE — 25010000002 ENOXAPARIN PER 10 MG: Performed by: INTERNAL MEDICINE

## 2023-08-09 PROCEDURE — 25010000002 HYDRALAZINE PER 20 MG: Performed by: INTERNAL MEDICINE

## 2023-08-09 PROCEDURE — 94002 VENT MGMT INPAT INIT DAY: CPT

## 2023-08-09 PROCEDURE — 25010000002 SUCCINYLCHOLINE PER 20 MG: Performed by: NURSE ANESTHETIST, CERTIFIED REGISTERED

## 2023-08-09 PROCEDURE — 25010000002 MORPHINE PER 10 MG: Performed by: INTERNAL MEDICINE

## 2023-08-09 PROCEDURE — 44055 CORRECT MALROTATION OF BOWEL: CPT | Performed by: SURGERY

## 2023-08-09 PROCEDURE — 36430 TRANSFUSION BLD/BLD COMPNT: CPT

## 2023-08-09 PROCEDURE — 25010000002 ONDANSETRON PER 1 MG: Performed by: NURSE ANESTHETIST, CERTIFIED REGISTERED

## 2023-08-09 PROCEDURE — 84466 ASSAY OF TRANSFERRIN: CPT | Performed by: INTERNAL MEDICINE

## 2023-08-09 PROCEDURE — 25010000002 HYDROMORPHONE 1 MG/ML SOLUTION: Performed by: NURSE ANESTHETIST, CERTIFIED REGISTERED

## 2023-08-09 PROCEDURE — 25010000002 LORAZEPAM PER 2 MG: Performed by: INTERNAL MEDICINE

## 2023-08-09 PROCEDURE — 94761 N-INVAS EAR/PLS OXIMETRY MLT: CPT

## 2023-08-09 PROCEDURE — 0 CEFAZOLIN SODIUM-DEXTROSE 2-3 GM-%(50ML) RECONSTITUTED SOLUTION: Performed by: STUDENT IN AN ORGANIZED HEALTH CARE EDUCATION/TRAINING PROGRAM

## 2023-08-09 PROCEDURE — 83540 ASSAY OF IRON: CPT | Performed by: INTERNAL MEDICINE

## 2023-08-09 RX ORDER — PROPOFOL 10 MG/ML
INJECTION, EMULSION INTRAVENOUS AS NEEDED
Status: DISCONTINUED | OUTPATIENT
Start: 2023-08-09 | End: 2023-08-09 | Stop reason: SURG

## 2023-08-09 RX ORDER — ONDANSETRON 2 MG/ML
INJECTION INTRAMUSCULAR; INTRAVENOUS AS NEEDED
Status: DISCONTINUED | OUTPATIENT
Start: 2023-08-09 | End: 2023-08-09 | Stop reason: SURG

## 2023-08-09 RX ORDER — KETAMINE HCL IN NACL, ISO-OSM 100MG/10ML
SYRINGE (ML) INJECTION AS NEEDED
Status: DISCONTINUED | OUTPATIENT
Start: 2023-08-09 | End: 2023-08-09 | Stop reason: SURG

## 2023-08-09 RX ORDER — ALBUTEROL SULFATE 90 UG/1
AEROSOL, METERED RESPIRATORY (INHALATION) AS NEEDED
Status: DISCONTINUED | OUTPATIENT
Start: 2023-08-09 | End: 2023-08-09 | Stop reason: SURG

## 2023-08-09 RX ORDER — SODIUM CHLORIDE 0.9 % (FLUSH) 0.9 %
10 SYRINGE (ML) INJECTION AS NEEDED
Status: DISCONTINUED | OUTPATIENT
Start: 2023-08-09 | End: 2023-08-09 | Stop reason: HOSPADM

## 2023-08-09 RX ORDER — ROCURONIUM BROMIDE 10 MG/ML
INJECTION, SOLUTION INTRAVENOUS AS NEEDED
Status: DISCONTINUED | OUTPATIENT
Start: 2023-08-09 | End: 2023-08-09 | Stop reason: SURG

## 2023-08-09 RX ORDER — DILTIAZEM HYDROCHLORIDE 5 MG/ML
10 INJECTION INTRAVENOUS EVERY 6 HOURS PRN
Status: DISCONTINUED | OUTPATIENT
Start: 2023-08-09 | End: 2023-08-11

## 2023-08-09 RX ORDER — SUCCINYLCHOLINE CHLORIDE 20 MG/ML
INJECTION INTRAMUSCULAR; INTRAVENOUS AS NEEDED
Status: DISCONTINUED | OUTPATIENT
Start: 2023-08-09 | End: 2023-08-09 | Stop reason: SURG

## 2023-08-09 RX ORDER — HYDRALAZINE HYDROCHLORIDE 20 MG/ML
10 INJECTION INTRAMUSCULAR; INTRAVENOUS EVERY 4 HOURS PRN
Status: DISCONTINUED | OUTPATIENT
Start: 2023-08-09 | End: 2023-08-18 | Stop reason: HOSPADM

## 2023-08-09 RX ORDER — PREDNISONE 10 MG/1
10 TABLET ORAL DAILY
COMMUNITY
End: 2023-08-18 | Stop reason: HOSPADM

## 2023-08-09 RX ORDER — DILTIAZEM HYDROCHLORIDE 5 MG/ML
0.25 INJECTION INTRAVENOUS ONCE AS NEEDED
Status: COMPLETED | OUTPATIENT
Start: 2023-08-09 | End: 2023-08-10

## 2023-08-09 RX ORDER — MAGNESIUM HYDROXIDE 1200 MG/15ML
LIQUID ORAL AS NEEDED
Status: DISCONTINUED | OUTPATIENT
Start: 2023-08-09 | End: 2023-08-09 | Stop reason: HOSPADM

## 2023-08-09 RX ORDER — BUPIVACAINE HCL/0.9 % NACL/PF 0.125 %
PLASTIC BAG, INJECTION (ML) EPIDURAL AS NEEDED
Status: DISCONTINUED | OUTPATIENT
Start: 2023-08-09 | End: 2023-08-09 | Stop reason: SURG

## 2023-08-09 RX ORDER — IPRATROPIUM BROMIDE AND ALBUTEROL SULFATE 2.5; .5 MG/3ML; MG/3ML
3 SOLUTION RESPIRATORY (INHALATION) ONCE AS NEEDED
Status: DISCONTINUED | OUTPATIENT
Start: 2023-08-09 | End: 2023-08-15

## 2023-08-09 RX ORDER — ETOMIDATE 2 MG/ML
INJECTION INTRAVENOUS AS NEEDED
Status: DISCONTINUED | OUTPATIENT
Start: 2023-08-09 | End: 2023-08-09 | Stop reason: SURG

## 2023-08-09 RX ORDER — SODIUM CHLORIDE, SODIUM LACTATE, POTASSIUM CHLORIDE, CALCIUM CHLORIDE 600; 310; 30; 20 MG/100ML; MG/100ML; MG/100ML; MG/100ML
1000 INJECTION, SOLUTION INTRAVENOUS CONTINUOUS
Status: ACTIVE | OUTPATIENT
Start: 2023-08-09 | End: 2023-08-11

## 2023-08-09 RX ORDER — BUPIVACAINE HYDROCHLORIDE 5 MG/ML
INJECTION, SOLUTION EPIDURAL; INTRACAUDAL
Status: COMPLETED | OUTPATIENT
Start: 2023-08-09 | End: 2023-08-09

## 2023-08-09 RX ORDER — ONDANSETRON 2 MG/ML
4 INJECTION INTRAMUSCULAR; INTRAVENOUS ONCE AS NEEDED
Status: COMPLETED | OUTPATIENT
Start: 2023-08-09 | End: 2023-08-15

## 2023-08-09 RX ORDER — FENTANYL CITRATE 50 UG/ML
INJECTION, SOLUTION INTRAMUSCULAR; INTRAVENOUS AS NEEDED
Status: DISCONTINUED | OUTPATIENT
Start: 2023-08-09 | End: 2023-08-09 | Stop reason: SURG

## 2023-08-09 RX ORDER — CEFAZOLIN SODIUM 2 G/50ML
2000 SOLUTION INTRAVENOUS ONCE
Status: COMPLETED | OUTPATIENT
Start: 2023-08-09 | End: 2023-08-09

## 2023-08-09 RX ORDER — LIDOCAINE HYDROCHLORIDE 20 MG/ML
INJECTION, SOLUTION INTRAVENOUS AS NEEDED
Status: DISCONTINUED | OUTPATIENT
Start: 2023-08-09 | End: 2023-08-09 | Stop reason: SURG

## 2023-08-09 RX ADMIN — METOPROLOL TARTRATE 2.5 MG: 1 INJECTION, SOLUTION INTRAVENOUS at 03:08

## 2023-08-09 RX ADMIN — ALBUTEROL SULFATE 12 PUFF: 90 AEROSOL, METERED RESPIRATORY (INHALATION) at 12:03

## 2023-08-09 RX ADMIN — BUPIVACAINE HYDROCHLORIDE 12.5 ML: 5 INJECTION, SOLUTION EPIDURAL; INTRACAUDAL; PERINEURAL at 13:27

## 2023-08-09 RX ADMIN — METOPROLOL TARTRATE 2.5 MG: 1 INJECTION, SOLUTION INTRAVENOUS at 21:26

## 2023-08-09 RX ADMIN — ROCURONIUM BROMIDE 30 MG: 10 INJECTION, SOLUTION INTRAVENOUS at 10:57

## 2023-08-09 RX ADMIN — DILTIAZEM HYDROCHLORIDE 10 MG: 5 INJECTION INTRAVENOUS at 05:09

## 2023-08-09 RX ADMIN — SUGAMMADEX 200 MG: 100 INJECTION, SOLUTION INTRAVENOUS at 12:40

## 2023-08-09 RX ADMIN — HYDROMORPHONE HYDROCHLORIDE 0.25 MG: 1 INJECTION, SOLUTION INTRAMUSCULAR; INTRAVENOUS; SUBCUTANEOUS at 15:02

## 2023-08-09 RX ADMIN — LORAZEPAM 1 MG: 2 INJECTION INTRAMUSCULAR; INTRAVENOUS at 03:08

## 2023-08-09 RX ADMIN — Medication 10 ML: at 21:31

## 2023-08-09 RX ADMIN — ETOMIDATE 15 MG: 2 INJECTION, SOLUTION INTRAVENOUS at 10:49

## 2023-08-09 RX ADMIN — SENNOSIDES AND DOCUSATE SODIUM 2 TABLET: 50; 8.6 TABLET ORAL at 21:27

## 2023-08-09 RX ADMIN — AMIODARONE HYDROCHLORIDE 1 MG/MIN: 1.8 INJECTION, SOLUTION INTRAVENOUS at 09:12

## 2023-08-09 RX ADMIN — SODIUM CHLORIDE, POTASSIUM CHLORIDE, SODIUM LACTATE AND CALCIUM CHLORIDE 100 ML/HR: 600; 310; 30; 20 INJECTION, SOLUTION INTRAVENOUS at 10:11

## 2023-08-09 RX ADMIN — AMIODARONE HYDROCHLORIDE 0.5 MG/MIN: 1.8 INJECTION, SOLUTION INTRAVENOUS at 15:05

## 2023-08-09 RX ADMIN — HYDRALAZINE HYDROCHLORIDE 10 MG: 20 INJECTION, SOLUTION INTRAMUSCULAR; INTRAVENOUS at 18:55

## 2023-08-09 RX ADMIN — SODIUM CHLORIDE, POTASSIUM CHLORIDE, SODIUM LACTATE AND CALCIUM CHLORIDE 100 ML/HR: 600; 310; 30; 20 INJECTION, SOLUTION INTRAVENOUS at 09:17

## 2023-08-09 RX ADMIN — PROCHLORPERAZINE EDISYLATE 5 MG: 5 INJECTION INTRAMUSCULAR; INTRAVENOUS at 03:08

## 2023-08-09 RX ADMIN — METOPROLOL TARTRATE 5 MG: 1 INJECTION, SOLUTION INTRAVENOUS at 03:09

## 2023-08-09 RX ADMIN — ENOXAPARIN SODIUM 70 MG: 100 INJECTION SUBCUTANEOUS at 09:04

## 2023-08-09 RX ADMIN — Medication 10 MG: at 10:27

## 2023-08-09 RX ADMIN — Medication 100 MCG: at 11:01

## 2023-08-09 RX ADMIN — SUGAMMADEX 200 MG: 100 INJECTION, SOLUTION INTRAVENOUS at 13:00

## 2023-08-09 RX ADMIN — BUPIVACAINE HYDROCHLORIDE 12.5 ML: 5 INJECTION, SOLUTION EPIDURAL; INTRACAUDAL; PERINEURAL at 13:24

## 2023-08-09 RX ADMIN — MORPHINE SULFATE 2 MG: 2 INJECTION, SOLUTION INTRAMUSCULAR; INTRAVENOUS at 20:16

## 2023-08-09 RX ADMIN — HYDROMORPHONE HYDROCHLORIDE 0.25 MG: 1 INJECTION, SOLUTION INTRAMUSCULAR; INTRAVENOUS; SUBCUTANEOUS at 19:05

## 2023-08-09 RX ADMIN — ROCURONIUM BROMIDE 10 MG: 10 INJECTION, SOLUTION INTRAVENOUS at 10:49

## 2023-08-09 RX ADMIN — PROPOFOL 10 MG: 10 INJECTION, EMULSION INTRAVENOUS at 10:35

## 2023-08-09 RX ADMIN — HYDROMORPHONE HYDROCHLORIDE 0.25 MG: 1 INJECTION, SOLUTION INTRAMUSCULAR; INTRAVENOUS; SUBCUTANEOUS at 21:13

## 2023-08-09 RX ADMIN — KETOROLAC TROMETHAMINE 15 MG: 30 INJECTION, SOLUTION INTRAMUSCULAR; INTRAVENOUS at 17:27

## 2023-08-09 RX ADMIN — PROPOFOL 10 MG: 10 INJECTION, EMULSION INTRAVENOUS at 10:27

## 2023-08-09 RX ADMIN — HYDROCORTISONE SODIUM SUCCINATE 100 MG: 100 INJECTION, POWDER, FOR SOLUTION INTRAMUSCULAR; INTRAVENOUS at 10:27

## 2023-08-09 RX ADMIN — ENOXAPARIN SODIUM 70 MG: 100 INJECTION SUBCUTANEOUS at 21:26

## 2023-08-09 RX ADMIN — Medication 100 MCG: at 10:55

## 2023-08-09 RX ADMIN — Medication 100 MCG: at 11:05

## 2023-08-09 RX ADMIN — LIDOCAINE HYDROCHLORIDE 50 MG: 20 INJECTION, SOLUTION INTRAVENOUS at 10:57

## 2023-08-09 RX ADMIN — METOPROLOL TARTRATE 2.5 MG: 1 INJECTION, SOLUTION INTRAVENOUS at 16:14

## 2023-08-09 RX ADMIN — MORPHINE SULFATE 2 MG: 2 INJECTION, SOLUTION INTRAMUSCULAR; INTRAVENOUS at 00:57

## 2023-08-09 RX ADMIN — HYDROMORPHONE HYDROCHLORIDE 1 MG: 1 INJECTION, SOLUTION INTRAMUSCULAR; INTRAVENOUS; SUBCUTANEOUS at 22:03

## 2023-08-09 RX ADMIN — SODIUM CHLORIDE, POTASSIUM CHLORIDE, SODIUM LACTATE AND CALCIUM CHLORIDE 1000 ML: 600; 310; 30; 20 INJECTION, SOLUTION INTRAVENOUS at 10:11

## 2023-08-09 RX ADMIN — Medication 100 MCG: at 10:57

## 2023-08-09 RX ADMIN — FENTANYL CITRATE 50 MCG: 50 INJECTION INTRAMUSCULAR; INTRAVENOUS at 11:35

## 2023-08-09 RX ADMIN — AMIODARONE HYDROCHLORIDE 150 MG: 1.5 INJECTION, SOLUTION INTRAVENOUS at 09:00

## 2023-08-09 RX ADMIN — SODIUM CHLORIDE, POTASSIUM CHLORIDE, SODIUM LACTATE AND CALCIUM CHLORIDE 100 ML/HR: 600; 310; 30; 20 INJECTION, SOLUTION INTRAVENOUS at 17:34

## 2023-08-09 RX ADMIN — ONDANSETRON 4 MG: 2 INJECTION INTRAMUSCULAR; INTRAVENOUS at 12:46

## 2023-08-09 RX ADMIN — CEFAZOLIN SODIUM 2 G: 2 SOLUTION INTRAVENOUS at 11:07

## 2023-08-09 RX ADMIN — FENTANYL CITRATE 50 MCG: 50 INJECTION INTRAMUSCULAR; INTRAVENOUS at 11:50

## 2023-08-09 RX ADMIN — SODIUM CHLORIDE 5 MG/HR: 900 INJECTION, SOLUTION INTRAVENOUS at 06:42

## 2023-08-09 RX ADMIN — Medication 100 MCG: at 10:52

## 2023-08-09 RX ADMIN — MORPHINE SULFATE 2 MG: 2 INJECTION, SOLUTION INTRAMUSCULAR; INTRAVENOUS at 13:50

## 2023-08-09 RX ADMIN — ONDANSETRON 4 MG: 2 INJECTION INTRAMUSCULAR; INTRAVENOUS at 00:57

## 2023-08-09 RX ADMIN — DILTIAZEM HYDROCHLORIDE 10 MG: 5 INJECTION INTRAVENOUS at 21:36

## 2023-08-09 RX ADMIN — HYDROMORPHONE HYDROCHLORIDE 0.25 MG: 1 INJECTION, SOLUTION INTRAMUSCULAR; INTRAVENOUS; SUBCUTANEOUS at 16:36

## 2023-08-09 RX ADMIN — SUCCINYLCHOLINE CHLORIDE 160 MG: 20 INJECTION, SOLUTION INTRAMUSCULAR; INTRAVENOUS at 10:49

## 2023-08-09 NOTE — PROGRESS NOTES
[facility]    Name:  Gabi Dudley   Age:  78 y.o.  Sex:  female  :  1945  MRN:  [id number]   Visit Number:  [id number]  Admission Date:  2023  Date Of Service:  23  Primary Care Physician:  Paul Quinteros MD    Patient was noted to have persistent atrial fibrillation with rapid ventricular rate despite receiving IV metoprolol.  Unfortunately, she is unable to take any oral medications as she has high-grade small bowel obstruction and is currently on NG tube aspiration.    Vital signs:    Vital Signs (last 24 hours)          07 0659    Most Recent      Temp (øF) 97.6 -  98.7    97.4 -  98.5     97.8 (36.6) 326    Heart Rate 67 -  95    98 -  165     116  0517    Resp 17 -  22    16 -  20     20  0326    /65 -  136/59    120/75 -  156/64     130/61  0517    SpO2 (%) 93 -  100    92 -  100     92 326     Patient was given IV Cardizem with initial improvement in tachycardia but subsequently had tachycardia ranging into the 140s.  She was moved to the telemetry floor and I have started her on Cardizem drip to control her atrial fibrillation with tachycardia.  Discussed with nursing staff at the bedside.    Lenny Dewitt MD  23  06:28 EDT    Dictated utilizing Dragon dictation.

## 2023-08-09 NOTE — PROGRESS NOTES
[facility]    Name:  Gabi Dudley   Age:  78 y.o.  Sex:  female  :  1945  MRN:  [id number]   Visit Number:  [id number]  Admission Date:  2023  Date Of Service:  23  Primary Care Physician:  Paul Quinteros MD     LOS: 0 days :    Chief Complaint:      Abdominal pain    Subjective:    2023 patient with RVR overnight was started on Cardizem but that did not help.  Persistent pain discussed with Dr. Isaac taking to the OR.  Discussed with Dr. Curry who recommended amiodarone and discussed perioperative risk management with Dr. Isaac.  Hospital course:  2023 Pain not well controlled increased morphine dose from 1 mg to 2 mg    History Of Presenting Illness:       Patient is a chronically ill 78-year-old female history significant for hypertension, hyperlipidemia, depression/anxiety and CAD who presents to the emergency room with 3 to 4 days of progressively worsening abdominal pain.  Patient describes the pain as intermittent and sharp, diffuse across her abdomen.  Patient has chronic constipation so cannot recall her last bowel movement.  Admits to some mild nausea, denies vomiting.  Patient does have a history of small bowel obstruction which required colectomy 2018.  States pain is similar but not as severe as previous episode.  Nothing makes it better or worse.  Patient does admit to still passing gas.  Upon arrival to the ER, patient afebrile hemodynamically stable and nonhypoxic.  CMP unremarkable except for glucose 142.  Lactate lipase normal.  WBC 13, hemoglobin hematocrit at baseline, platelets 112.  UA negative for urinary tract infection.  CT abdomen pelvis revealed mid small bowel dilation worrisome for partial small bowel obstruction.  Dr. Isaac agreed to consult hospital service asked to admit.  Edited by: Milton Urban DO at 2023 1009     Review of Systems:     All systems were reviewed and negative except as  mentioned in subjective, assessment and plan.    Vital Signs:    Temp:  [97.4 øF (36.3 øC)-98.7 øF (37.1 øC)] 98.7 øF (37.1 øC)  Heart Rate:  [] 135  Resp:  [16-20] 19  BP: (106-156)/(61-99) 106/80    Intake and output:    I/O last 3 completed shifts:  In: 2783.8 [P.O.:40; I.V.:2743.8]  Out: 1400 [Urine:500; Emesis/NG output:900]  No intake/output data recorded.    Physical Examination:    Physical Exam  Vitals reviewed.   Constitutional:       Appearance: Ill-appearing  HENT:      Head: Normocephalic and atraumatic.      Right Ear: External ear normal.      Left Ear: External ear normal.      Mouth/Throat:      Mouth: Mucous membranes are moist.      Pharynx: Oropharynx is clear.   Eyes:      Extraocular Movements: Extraocular movements intact.      Conjunctiva/sclera: Conjunctivae normal.   Cardiovascular:      Rate and Rhythm: Normal rate and regular rhythm.      Pulses: Normal pulses.      Heart sounds: Normal heart sounds.   Pulmonary:      Effort: Pulmonary effort is normal.      Breath sounds: Expiratory wheeze noted bilaterally.  Abdominal:      General: Bowel sounds are normal. There is no distension. Firmness in RLQ chronic     Comments: Moderate diffuse abdominal tenderness  Musculoskeletal:         General: Normal range of motion.      Right lower leg: No edema.      Left lower leg: No edema.   Skin:     General: Skin is warm and dry.   Neurological:      Mental Status: She decreased level of consciousness, answers questions appropriately  Psychiatric:         Behavior: Behavior normal  Edited by: Milton Urban DO at 8/9/2023 1009     Laboratory results:    Results from last 7 days   Lab Units 08/09/23  0701 08/08/23  0523 08/07/23  1535   SODIUM mmol/L 146* 146* 145   POTASSIUM mmol/L 3.6 4.0 4.4   CHLORIDE mmol/L 103 104 102   CO2 mmol/L 37.9* 35.8* 34.8*   BUN mg/dL 29* 24* 23   CREATININE mg/dL 0.54* 0.55* 0.65   CALCIUM mg/dL 8.1* 8.7 9.2   BILIRUBIN mg/dL  --   --  0.3   ALK PHOS U/L   --   --  59   ALT (SGPT) U/L  --   --  12   AST (SGOT) U/L  --   --  19   GLUCOSE mg/dL 115* 142* 142*     Results from last 7 days   Lab Units 08/09/23  0701 08/08/23  0523 08/07/23  1535   WBC 10*3/mm3 4.05 11.28* 12.90*   HEMOGLOBIN g/dL 7.8* 8.5* 8.8*   HEMATOCRIT % 29.4* 31.8* 33.9*   PLATELETS 10*3/mm3 143 160 112*                 Recent Labs     11/21/22  1027 01/23/23  0050 05/22/23  1843   PHART 7.443 7.334* 7.318*   XXB1MFS 47.0* 67.6* 74.3*   PO2ART 68.9* 156.0* 69.2*   EJI1YSS 32.1* 36.0* 38.1*   BASEEXCESS 7.1* 8.7* 10.6*      I have reviewed the patient's laboratory results.    Radiology results:    CT Abdomen Pelvis With Contrast    Result Date: 8/8/2023  FINAL REPORT TECHNIQUE: Routine axial images through the abdomen and pelvis were obtained following IV contrast administration. CLINICAL HISTORY: Small bowel obstruction FOLLOW UP FROM YESTERDAY ORAL CONTRAST GIVEN VIA NG TUBE 6 HOURS PRIOR. COMPARISON: 8/7/2023 FINDINGS: Abdomen: The gallbladder has been removed.  Again seen are bilateral renal cysts.  There is a 2.9 cm left adrenal nodule, likely an adenoma in the absence of a known malignancy.  The other solid abdominal organs and ureters are unremarkable. There are moderately dilated multiple loops of proximal and mid small bowel with collapsed distal small bowel consistent with a high-grade small bowel obstruction.  No convincing passage of contrast into the collapsed distal small bowel.  There are postoperative changes of the cecum, possibly an appendectomy. There is left-sided diverticulosis.  The GI tract is otherwise unremarkable.  Pelvis: The urinary bladder is unremarkable.  The uterus and ovaries are not visualized.  There is no pelvic or abdominal ascites, adenopathy or acute osseous abnormality.     Impression: High-grade distal small bowel obstruction. Authenticated and Electronically Signed by Fidel Knapp M.D. on 08/08/2023 07:47:16 PM    CT Abdomen Pelvis With Contrast    Result Date:  8/7/2023  FINAL REPORT TECHNIQUE: Routine axial images through the abdomen and pelvis were obtained following IV contrast administration. CLINICAL HISTORY: lower abd pain cramps, n/v, constipation, h/o obstruction feels similar COMPARISON: 3/22/2023 FINDINGS: Abdomen: The gallbladder has been removed.  There is no change in the 2.9 cm left adrenal nodule.  In the absence of known malignancy this is most likely an adenoma.  Follow-up CT in 9 - 12 monhs to confirm stability is recommended.  There are bilateral renal cysts.  The solid abdominal organs and ureters are otherwise unremarkable.  There are new moderately dilated multiple loops of mid small bowel.  The distal small bowel is normal in caliber and a partial bowel obstruction is not excluded.  The site and etiology of the obstruction is not apparent.  There are postoperative changes of the cecum.  The GI tract is otherwise unremarkable.  The appendix is not visualized and may have been removed. Pelvis: The urinary bladder is unremarkable.  Uterus and ovaries are absent.  There is no pelvic or abdominal ascites, adenopathy or acute osseous abnormality.     Impression: Mid small bowel dilation worrisome for partial bowel obstruction of uncertain etiology. Stable adrenal nodule, consider follow-up CT to confirm stability. Authenticated and Electronically Signed by Fidel Knapp M.D. on 08/07/2023 07:32:37 PM    XR Abdomen KUB    Result Date: 8/8/2023  PROCEDURE: XR ABDOMEN KUB-  HISTORY: NG TUBE PLACEMENT; K56.600-Partial intestinal obstruction, unspecified as to cause; I48.91-Unspecified atrial fibrillation  COMPARISON: None.  FINDINGS: Single view of the abdomen demonstrates a nasogastric tube seen coursing below the diaphragm with the tip terminating in the distal stomach/proximal duodenum. There is minimal right basilar atelectasis or scarring. The heart is normal in size.      Impression: NG tube coursing below diaphragm with the tip terminating the distal  stomach/proximal duodenum.     Images were reviewed, interpreted, and dictated by Dr. Angeles Collins MD Transcribed by Mickey Perez PA-C.  This report was signed and finalized on 8/8/2023 8:18 AM by Angeles Collins MD.     I have reviewed the patient's radiology reports.    Medication Review:     I have reviewed the patient's active and prn medications.     Problem List:      Partial small bowel obstruction      Assessment/Plan:    Partial small bowel obstruction.    History of colectomy due to obstruction in 2018  Paroxysmal A-fib with RVR   Hypertension  Hyperlipidemia  Depression/anxiety  GERD    -To the OR 8/9/2023 Dr. Isaac, then to the ICU  -NG tube in place  -Gastrografin challenge per Dr. Isaac 8/8/2023  -Transition to amiodarone per Dr. Curry's recommendations 8/9/2023  -Increased wheezing check a chest x-ray  -will check iron panel          Edited by: Milton Urban DO at 8/9/2023 1009       DVT Prophylaxis: Prophylaxis: lovenox treatment dose on MAR hold  Code Status:   Code Status and Medical Interventions:   Ordered at: 08/07/23 2023     Code Status (Patient has no pulse and is not breathing):    CPR (Attempt to Resuscitate)     Medical Interventions (Patient has pulse or is breathing):    Full Support     Release to patient:    Routine Release      Diet:   Dietary Orders (From admission, onward)       Start     Ordered    08/07/23 2023  NPO Diet NPO Type: Strict NPO  Diet Effective Now        Question:  NPO Type  Answer:  Strict NPO    08/07/23 2023                   Discharge Plan: Anticipate home at [place]    Milton Urban DO  08/09/23  10:09 EDT    Dictated utilizing Dragon dictation.

## 2023-08-09 NOTE — CONSULTS
[facility] Cardiology Consult Note    Gabi Dudley  1945  [id number]  23    Requesting Physician: No ref. provider found    Chief Complaint: Abdominal pain    History of Present Illness:   Mrs. Gabi Dudley is a 78 y.o. female who is being seen by Cardiology for atrial fibrillation with rapid ventricular rates.  The patient has a past medical history significant for hypertension, dyslipidemia, prior pulmonary embolism, fibromyalgia, anxiety, and chronic tobacco use complicated by COPD.  Her past cardiac history is significant for paroxysmal atrial fibrillation and nonobstructive coronary artery disease diagnosed on remote coronary angiogram in approximately .  She initially presented to the [facility] on 2023 for evaluation of abdominal pain.  She reports a several day history of abdominal pain, which was progressively worsening and associated with nausea.  Upon workup in the emergency department, the patient was found to have a mild leukocytosis with a WBC of 12.9.  Subsequent CT imaging revealed evidence of a small bowel obstruction.  The patient was initially treated conservatively, however is now being considered for surgical intervention.  Upon admission, the patient was in sinus rhythm.  At approximately 230 this morning, the patient went into atrial fibrillation with RVR and was subsequent started on a Cardizem drip.  Cardiology has now been consulted for further recommendations.    Past Cardiac Testin. Last Coronary Angio:  , reportedly nonobstructive disease.  Report unavailable  2. Prior Stress Testing: Remote, with report unavailable  3. Last Echo:                           1.  Normal left ventricular size and systolic function, LVEF 60-65%.                          2.  Mild concentric LVH.                          3.  Normal LV diastolic filling pattern.                          4.  Normal right ventricular size and systolic  "function.                          5.  Mildly increased left atrial volume index.                          6.  No significant valvular abnormalities.  4. Prior Holter Monitor: 48-hour Holter 6/16/2020              1.  The predominant rhythm is sinus rhythm with an average heart rate 90 bpm.              2.  Normal atrioventricular conduction.              3.  No sustained supraventricular or ventricular arrhythmias.              4.  No episodes of paroxysmal atrial fibrillation.              5.  Rare supraventricular ectopy with isolated PACs, burden <0.1%.              6.  Rare ventricular ectopy with isolated and pairs of PVCs, burden <0.1%.              7.  One symptomatic episode of \"anxiety, chest pain, shortness of air\" corresponded to NSR at 91 bpm.    Review of Systems:   Review of Systems   Constitutional:  Positive for fatigue. Negative for activity change, appetite change, chills, diaphoresis, fever, unexpected weight gain and unexpected weight loss.   Respiratory:  Negative for cough, chest tightness, shortness of breath and wheezing.    Cardiovascular:  Positive for palpitations. Negative for chest pain and leg swelling.   Gastrointestinal:  Positive for abdominal pain. Negative for anal bleeding, blood in stool and GERD.   Endocrine: Negative for cold intolerance and heat intolerance.   Genitourinary:  Negative for hematuria.   Neurological:  Negative for dizziness, syncope, weakness and light-headedness.   Hematological:  Does not bruise/bleed easily.   Psychiatric/Behavioral:  Negative for depressed mood and stress. The patient is not nervous/anxious.      Past Medical History:   Past Medical History:   Diagnosis Date    Adrenal adenoma     Anemia     Arrhythmia     Asthma     Atrial fibrillation     Back pain     Benign colonic polyp     Benign tumor of adrenal gland     Cataract     bilateral    Cholelithiasis     Chronic bronchitis     Chronic bronchitis with COPD (chronic obstructive pulmonary " disease)     COPD (chronic obstructive pulmonary disease) 2005    Coronary artery disease     Depression     Diverticulosis Years ago    Elevated cholesterol     Environmental and seasonal allergies     Fibromyalgia     Fibromyalgia, primary Sometime in the 90s    Gastritis     Generalized anxiety disorder     GERD (gastroesophageal reflux disease)     H/O mammogram     Headache Years ago    Hemorrhoids     History of blood transfusion 1985    History of blood transfusion 1985    History of echocardiogram     History of endometriosis     History of nuclear stress test     Hospitalization or health care facility admission within last 6 months     5 times between 11/2022-05/2023    Hypertension     IBS (irritable bowel syndrome)     Impaired functional mobility, balance, gait, and endurance     Impaired mobility     Inverted nipple     Kidney disease     Kidney stone     Liver cyst     Low back pain Years ago    Nodular radiologic density     Nodule of left lung     On home oxygen therapy     2 liters NC QHS    Osteoarthritis     Osteopenia Several years ago    Osteoporosis     PONV (postoperative nausea and vomiting)     Renal cyst     Sinus problem     2014    Sinusitis     Skin cancer     basal cell carcinoma    SOB (shortness of breath)     Tobacco use     Urinary frequency     Urinary tract infection Years ago    Frequent UTIs    Vitamin D deficiency     Wears glasses     Wears partial dentures     upper plate       Past Surgical History:   Past Surgical History:   Procedure Laterality Date    APPENDECTOMY  1980s    CARDIAC CATHETERIZATION  2003    CATARACT EXTRACTION  2013    both eyes    CHOLECYSTECTOMY  2020    CHOLECYSTECTOMY WITH INTRAOPERATIVE CHOLANGIOGRAM N/A 10/30/2020    Procedure: CHOLECYSTECTOMY LAPAROSCOPIC INTRAOPERATIVE CHOLANGIOGRAPHY;  Surgeon: Sima Moses MD;  Location: [facility];  Service: General;  Laterality: N/A;    COLON SURGERY  2020    COLONOSCOPY  03/11/2013    COLONOSCOPY   2016    COLONOSCOPY N/A 2019    Procedure: COLONOSCOPY W/ COLD FORCEP POLYPECTOMIES; HOT SNARE POLYPECTOMIES; COLD SNARE POLYPECTOMY;  Surgeon: Goyo Nunez MD;  Location: [facility] ENDOSCOPY;  Service: Gastroenterology    COLONOSCOPY W/ BIOPSIES AND POLYPECTOMY      EXPLORATORY LAPAROTOMY N/A 2020    Procedure: colectomy, right, closure of enterotomy x 2, reduction of internal volvulus;  Surgeon: Sima Moses MD;  Location: [facility] OR;  Service: General;  Laterality: N/A;    HYSTERECTOMY  1980s    partial    LYSIS OF ABDOMINAL ADHESIONS      TONSILLECTOMY      UPPER GASTROINTESTINAL ENDOSCOPY  2015    VAGINAL DELIVERY      x2       Family History:   Family History   Problem Relation Age of Onset    Stomach cancer Brother     Colon cancer Maternal Aunt     Brain cancer Father     Arthritis Father     Hypertension Father     Cancer Father         Father, brother, and aunt    Lung cancer Paternal Grandfather     Heart disease Mother         Mother passed away due to heart disease    Breast cancer Neg Hx     Ovarian cancer Neg Hx        Social History:   Social History     Socioeconomic History    Marital status:    Tobacco Use    Smoking status: Former     Packs/day: 0.25     Years: 30.00     Pack years: 7.50     Types: Cigarettes     Quit date: 2020     Years since quittin.7     Passive exposure: Past    Smokeless tobacco: Never   Vaping Use    Vaping Use: Never used   Substance and Sexual Activity    Alcohol use: No    Drug use: No    Sexual activity: Not Currently     Birth control/protection: Post-menopausal       Medications:     Current Facility-Administered Medications:     acetaminophen (TYLENOL) tablet 650 mg, 650 mg, Oral, Q4H PRN **OR** acetaminophen (TYLENOL) 160 MG/5ML solution 650 mg, 650 mg, Oral, Q4H PRN **OR** acetaminophen (TYLENOL) suppository 650 mg, 650 mg, Rectal, Q4H PRN, George Durant DO    amiodarone 150 mg in 100 mL D5W (loading dose), 150  mg, Intravenous, Once **FOLLOWED BY** amiodarone 360 mg in 200 mL D5W infusion, 1 mg/min, Intravenous, Continuous **FOLLOWED BY** amiodarone 360 mg in 200 mL D5W infusion, 0.5 mg/min, Intravenous, Continuous, Milton Urban DO    sennosides-docusate (PERICOLACE) 8.6-50 MG per tablet 2 tablet, 2 tablet, Oral, BID **AND** polyethylene glycol (MIRALAX) packet 17 g, 17 g, Oral, Daily PRN **AND** bisacodyl (DULCOLAX) EC tablet 5 mg, 5 mg, Oral, Daily PRN **AND** bisacodyl (DULCOLAX) suppository 10 mg, 10 mg, Rectal, Daily PRN, George Durant DO    dilTIAZem (CARDIZEM) injection 10 mg, 10 mg, Intravenous, Q6H PRN, Lenny Dewitt MD, 10 mg at 08/09/23 0509    Enoxaparin Sodium (LOVENOX) syringe 70 mg, 1 mg/kg, Subcutaneous, Q12H, George Durant DO, 70 mg at 08/08/23 0830    glycerin 35 % liquid 35% 2 spray, 2 spray, Mouth/Throat, Q2H PRN, Milton Urban DO, 2 spray at 08/08/23 2128    ipratropium-albuterol (DUO-NEB) nebulizer solution 1.5 mL, 1.5 mL, Nebulization, Q4H PRN, George Durant DO    ketorolac (TORADOL) injection 15 mg, 15 mg, Intravenous, Q6H PRN, George Durant DO, 15 mg at 08/08/23 1107    lactated ringers infusion, 100 mL/hr, Intravenous, Continuous, Milton Urban DO, Last Rate: 100 mL/hr at 08/09/23 0600, 100 mL/hr at 08/09/23 0600    LORazepam (ATIVAN) injection 1 mg, 1 mg, Intravenous, Q6H PRN, George Durant DO, 1 mg at 08/09/23 0308    melatonin tablet 5 mg, 5 mg, Oral, Nightly PRN, Bhargav, George, DO    metoprolol tartrate (LOPRESSOR) injection 2.5 mg, 2.5 mg, Intravenous, Q6H, George Durant DO, 2.5 mg at 08/09/23 0308    Morphine sulfate (PF) injection 2 mg, 2 mg, Intravenous, Q2H PRN, Bianca Isaac DO, 2 mg at 08/08/23 2129    Morphine sulfate (PF) injection 2 mg, 2 mg, Intravenous, Q4H PRN, 2 mg at 08/09/23 0057 **AND** naloxone (NARCAN) injection 0.4 mg, 0.4 mg, Intravenous, Q5 Min PRN, Milton Urban,     nitroglycerin (NITROSTAT) SL tablet 0.4 mg, 0.4  mg, Sublingual, Q5 Min PRN, George Durant DO    ondansetron (ZOFRAN) injection 4 mg, 4 mg, Intravenous, Q6H PRN, George Durant DO, 4 mg at 08/09/23 0057    Pharmacy to Dose enoxaparin (LOVENOX), , Does not apply, Continuous PRN, George Durant DO    prochlorperazine (COMPAZINE) injection 5 mg, 5 mg, Intravenous, Q6H PRN, 5 mg at 08/09/23 0308 **OR** prochlorperazine (COMPAZINE) tablet 5 mg, 5 mg, Oral, Q6H PRN **OR** prochlorperazine (COMPAZINE) suppository 25 mg, 25 mg, Rectal, Q12H PRN, George Durant DO    sodium chloride 0.9 % flush 10 mL, 10 mL, Intravenous, PRN, Alberto Cox MD    sodium chloride 0.9 % flush 10 mL, 10 mL, Intravenous, Q12H, George Durant DO, 10 mL at 08/08/23 0832    sodium chloride 0.9 % flush 10 mL, 10 mL, Intravenous, PRN, George Durant DO    sodium chloride 0.9 % infusion 40 mL, 40 mL, Intravenous, PRN, George Durant DO    Allergies:   Allergies   Allergen Reactions    Doxycycline GI Intolerance       Physical Exam:  Vital Signs:   Vitals:    08/09/23 0517 08/09/23 0642 08/09/23 0709 08/09/23 0721   BP: 130/61 129/91 130/80 106/80   BP Location:    Left arm   Patient Position:    Lying   Pulse: 116 109 (!) 144 (!) 150   Resp:    20   Temp:    98.7 øF (37.1 øC)   TempSrc:    Oral   SpO2:    99%   Weight:       Height:           Physical Exam  Vitals and nursing note reviewed.   Constitutional:       General: She is not in acute distress.     Appearance: She is well-developed. She is not diaphoretic.   HENT:      Head: Normocephalic and atraumatic.   Eyes:      General: No scleral icterus.     Pupils: Pupils are equal, round, and reactive to light.   Neck:      Trachea: No tracheal deviation.   Cardiovascular:      Rate and Rhythm: Tachycardia present. Rhythm irregular.      Heart sounds: Normal heart sounds. No murmur heard.    No friction rub. No gallop.      Comments: Normal JVD.  Pulmonary:      Effort: Pulmonary effort is normal. No respiratory distress.       Breath sounds: Normal breath sounds. No stridor. No wheezing or rales.   Chest:      Chest wall: No tenderness.   Abdominal:      General: There is distension.      Palpations: Abdomen is soft.      Tenderness: There is abdominal tenderness.   Musculoskeletal:         General: No swelling. Normal range of motion.      Cervical back: Neck supple. No tenderness.   Lymphadenopathy:      Cervical: No cervical adenopathy.   Skin:     General: Skin is warm and dry.      Findings: No erythema.   Neurological:      General: No focal deficit present.      Mental Status: She is alert and oriented to person, place, and time.   Psychiatric:         Mood and Affect: Mood normal.         Behavior: Behavior normal.       Results Review:   Results from last 7 days   Lab Units 08/09/23  0701 08/08/23 0523 08/07/23  1535   SODIUM mmol/L 146*   < > 145   POTASSIUM mmol/L 3.6   < > 4.4   CHLORIDE mmol/L 103   < > 102   CO2 mmol/L 37.9*   < > 34.8*   BUN mg/dL 29*   < > 23   CREATININE mg/dL 0.54*   < > 0.65   CALCIUM mg/dL 8.1*   < > 9.2   BILIRUBIN mg/dL  --   --  0.3   ALK PHOS U/L  --   --  59   ALT (SGPT) U/L  --   --  12   AST (SGOT) U/L  --   --  19   GLUCOSE mg/dL 115*   < > 142*    < > = values in this interval not displayed.         Results from last 7 days   Lab Units 08/09/23  0701 08/08/23 0523 08/07/23  1535   WBC 10*3/mm3 4.05 11.28* 12.90*   HEMOGLOBIN g/dL 7.8* 8.5* 8.8*   HEMATOCRIT % 29.4* 31.8* 33.9*   PLATELETS 10*3/mm3 143 160 112*                   I personally viewed and interpreted the patient's EKG/Telemetry data     Assessment:  1.  Paroxysmal atrial fibrillation with rapid ventricular rates  2.  Nonobstructive coronary artery disease  3.  Small bowel obstruction  4.  Hypertension  5.  Hyperlipidemia    Plan:  1.  Paroxysmal atrial fibrillation with rapid ventricular rates  --Suspect atrial fibrillation being exacerbated by small bowel obstruction  --Ventricular rate poorly controlled  --Discontinue  diltiazem drip, start IV amiodarone with bolus and drip to attempt chemical cardioversion  --Can use as needed IV diltiazem or metoprolol if needed for additional rate control    2.  Preoperative cardiac risk assessment  --No current anginal symptoms, coronary artery disease nonobstructive on last assessment  --Currently in A-fib with RVR, however hemodynamically stable  --The patient is at moderate risk for adverse perioperative cardiac events, however given the need for surgery appears to be more urgent recommend proceeding with surgical intervention for small bowel obstruction  --If difficulty with RVR intraoperatively can use as needed diltiazem or metoprolol. If decompensation from RVR would then proceed with cardioversion to restore sinus rhythm  --Continue IV amiodarone in the perioperative setting      Thank you for allowing me to participate in the care of your patient. Please do not hesitate to contact me with additional questions or concerns.     VIDAL Curry MD  Interventional Cardiology   08/09/23  08:48 EDT

## 2023-08-09 NOTE — PROCEDURES
PATIENT:    Gabi Dudley    DATE OF SURGERY:  8/9/2023    PHYSICIAN: Bianca Isaac DO    REFERRING PHYSICIAN:  No ref. provider found    YOB: 1945    PREOPERATIVE DIAGNOSIS:   1) At risk for hemodynamic instability  2) Need for central access    POSTOPERATIVE DIAGNOSIS:  Same    PROCEDURE:       1)  Right Internal Jugular placement    2)  Right radial arterial line placement   3)  Ultrasound guidance    EBL:  Less than 50 cc    COMPLICATIONS:  None    ANESTHESIA:  Local    OPERATIVE PROCEDURE:  The patient was placed in the supine position. Right Internal Jugular region was then prepped and draped in the usual fashion.  Full sterile precautions were performed.  Timeout was also performed.  1% lidocaine was used locally.  A large-bore needle was used to gain access to the vein with ultrasound guidance.  A guidewire was inserted through the introducer needle. The needle was withdrawn and replaced with a dilator or the guidewire. The dilator was withdrawn and a triple lumen central catheter was inserted over the guidewire. The guidewire was removed. The catheter was anchored in place with suture.  All ports aspirated and flushed well.  Dressing was applied.  There were no complications.  Patient tolerated the procedure well.  There was scant blood loss throughout the procedure and there was no ongoing bleeding at conclusion.  Chest x-ray will be ordered to confirm placement.    The right radial artery was visualized with ultrasound. Using an Arrow radial arterial line kit, the artery was cannulated and bright pulsatile blood return was noted. The guidewire and catheter were threaded without difficulty. The catheter was attached to transducer and a good waveform was noted. The line was sutured to the skin with 3-0 silk. A sterile dressing was applied.        Bianca sIaac DO  8/9/2023  12:45 EDT   yes

## 2023-08-09 NOTE — ANESTHESIA POSTPROCEDURE EVALUATION
Patient: Gabi Dudley    Procedure Summary       Date: 08/09/23 Room / Location: [facility] OR  /  [facility]    Anesthesia Start: 1015 Anesthesia Stop: 1327    Procedure: LAPAROTOMY EXPLORATORY WITH LYIS OF ADHESIONS AND  CENTRAL LINE INSERTION (Abdomen) Diagnosis:     Surgeons: Bianca Isaac DO Provider: Elena Torres CRNA    Anesthesia Type: general, Michelle ASA Status: 4 - Emergent            Anesthesia Type: general, [place]    Vitals  Vitals Value Taken Time   /84 08/09/23 1345   Temp     Pulse 102 08/09/23 1350   Resp     SpO2 100 % 08/09/23 1350   Vitals shown include unvalidated device data.        Post Anesthesia Care and Evaluation    Patient location during evaluation: ICU  Patient participation: complete - patient cannot participate  Level of consciousness: awake and alert, responsive to physical stimuli, lethargic and sleepy but conscious  Pain score: 0  Pain management: adequate    Airway patency: patent (ETT)  Anesthetic complications: No anesthetic complications  PONV Status: none  Cardiovascular status: acceptable and hemodynamically stable  Respiratory status: intubated  Hydration status: acceptable    Comments: Vitals signs as noted in nursing documentation as per protocol.

## 2023-08-09 NOTE — OP NOTE
PROCEDURE DATE:  8/9/2023    SURGEON: Bianca Isaac DO    FIRST ASSIST: Gabe Bowden MD    PREOPERATIVE DIAGNOSIS: High grade small bowel obstruction    POSTOPERATIVE DIAGNOSIS: Same    PROCEDURE:   1) Exploratory laparotomy  2) Lysis of adhesions  3) Reduction of small bowel volvulus    ANESTHESIA: GETA    EBL: <50cc    SPECIMENS: None    INDICATIONS FOR THE PROCEDURE:  Patient is a 78 year old female with multiple comorobidities and a history of multiple bowel obstructions requiring surgical intervention who presented to the ED with complaint of abdominal pain and nausea. Initially, diagnosis was consistent with partial obstruction, however, she developed a complete high grade obstruction that was worsening with conservative management alone.     DESCRIPTION OF THE PROCEDURE:  The patient was seen and examined in the preoperative holding area on the day of surgery and there are no changes to the medical history.  The patient was then taken to the operating room and placed supine on the operating table where a timeout was performed using the WHO checklist.  The patient received appropriate preoperative antibiotics. A Lui catheter was placed. A central line and arterial line were inserted (see separate procedure note). The abdomen was prepped and draped in the usual sterile fashion.    A midline laparotomy incision was made over the patient's previous abdominal scar and deepened to the anterior abdominal fascia. Preexisting prolene sutures were cut with heavy scissors. The fascia was elevated and incised and the abdominal cavity was entered. Heavy midline omental adhesions were encountered and these were meticulously lysed with sharply and with electrocautery. I did consult with my colleague, Dr. Bowden, intraoperatively to assist during surgery. After significant lysis of adhesions, the small bowel was encountered and eviscerated. The remaining right colon was also identified. The anastomosis was intact  and did not appear to be involved in the obstruction. The distended small bowel was run from the ligament of Treitz until an area of torsion was encountered in the right lower quadrant. The small bowel had volvulized around itself secondary to a single thick adhesive band. The small bowel was distally decompressed. The band was lysed and the bowel was detorsed. The small bowel appeared pink and healthy and there was free flow of its contents from proximally to distally. The rest of the abdomen was inspected and no additional abnormalities were found. Hemostasis was assured. The bowel was returned to the abdominal cavity. The NG was checked for placement within the stomach.     The midline incision was closed with interrupted 0 prolene in figure eight fashion. The wound was irrigated with sterile saline. The skin was closed with skin staples and a sterile dressing was applied. Patient tolerated the procedure well and will be going to the ICU for post operative recovery.     DISPOSITION: ICU    STATUS: Stable     COMPLICATIONS: None apparent

## 2023-08-09 NOTE — ANESTHESIA PREPROCEDURE EVALUATION
Anesthesia Evaluation     Patient summary reviewed and Nursing notes reviewed   history of anesthetic complications:  PONV  NPO Solid Status: > 8 hours  NPO Liquid Status: > 8 hours           Airway   Mallampati: II  TM distance: >3 FB  Neck ROM: full  No difficulty expected  Dental - normal exam     Pulmonary - normal exam   (+) pneumonia , a smoker Former, COPD, asthma,home oxygen, shortness of breath    ROS comment: Pulmonary Function Test Interpretation     Pulmonary function test reviewed     Acceptable per ATS criteria.     Severe obstruction, with significant bronchodilator response.                  Post BD FEV1: 31 %  FEV1/FVC ratio was 50.     No restriction, but air-trapping suggested.     Repeat PFTs, if clinically indicated/necessary.    Cardiovascular - normal exam  Exercise tolerance: poor (<4 METS)    (+) hypertension, CADdysrhythmias Atrial Fib, hyperlipidemia    ROS comment: 1.  Normal left ventricular size and systolic function, LVEF 60-65%.  2.  Mild concentric LVH.  3.  Normal LV diastolic filling pattern.  4.  Normal right ventricular size and systolic function.  5.  Mildly increased left atrial volume index.  6.  No significant valvular abnormalities.      Neuro/Psych  (+) headaches, psychiatric history  GI/Hepatic/Renal/Endo    (+) GERD, liver disease, renal disease    Musculoskeletal     (+) back pain, myalgias  Abdominal    Substance History      OB/GYN          Other   arthritis,chronic steroid use    history of cancer    ROS/Med Hx Other: Adrenal adenoma  Anxiety  Chronic fatigue  Fibromyalgia  Hyperlipidemia  Essential hypertension  Insomnia  Osteoarthritis of knee  Osteoporosis  Pulmonary emphysema (HCC)  Simple renal cyst  Tension headache  Vitamin D deficiency  Diverticulosis  Inversion of nipple  Paroxysmal atrial fibrillation (HCC)  Coronary artery disease involving native coronary artery of native heart without angina pectoris  Autonomic dysfunction  Gastroesophageal reflux  disease without esophagitis  Urge incontinence  Erythrasma  Compression fracture of T5 vertebra  Lung nodule  COPD exacerbation  Overweight (BMI 25.0-29.9)  Acute on chronic respiratory failure with hypoxia  Acute on chronic respiratory failure with hypoxia and hypercapnia  Iron deficiency anemia  Impaired mobility and ADLs  Acute respiratory failure with hypoxia and hypercapnia  Aspiration pneumonia of right lower lobe  Partial small bowel obstruction                    Anesthesia Plan    ASA 4 - emergent     general and Michelle     (Risks and benefits discussed including risk of aspiration, recall and dental damage. All patient questions answered. Will continue with POC.)  intravenous induction     Anesthetic plan, risks, benefits, and alternatives have been provided, discussed and informed consent has been obtained with: spouse/significant other and child.    Plan discussed with CRNA.    CODE STATUS:    Code Status (Patient has no pulse and is not breathing): CPR (Attempt to Resuscitate)  Medical Interventions (Patient has pulse or is breathing): Full Support  Release to patient: Routine Release

## 2023-08-09 NOTE — PLAN OF CARE
Goal Outcome Evaluation:  Plan of Care Reviewed With: patient        Progress: declining  Outcome Evaluation: Patient's cardiac rhythm changed overnight into an Atrial Fib with RVR. MD notified and medications given. Very little improvement after medications so patient was transfered to 3rd floor for start of cardiac drip. Plan of care ongoing.

## 2023-08-09 NOTE — ANESTHESIA PROCEDURE NOTES
Peripheral Block    Pre-sedation assessment completed: 8/9/2023 10:15 AM    Patient reassessed immediately prior to procedure    Start time: 8/9/2023 1:23 PM  Stop time: 8/9/2023 1:27 PM  Reason for block: at surgeon's request and post-op pain management  Performed by  CRNA/CAA: Elena Torres, CRNA  Preanesthetic Checklist  Completed: patient identified, IV checked, site marked, risks and benefits discussed, surgical consent, monitors and equipment checked, pre-op evaluation and timeout performed  Prep:  Pt Position: supine  Sterile barriers:gloves, cap, sterile barriers and mask  Prep: ChloraPrep  Patient monitoring: blood pressure monitoring, continuous pulse oximetry and EKG  Procedure    Sedation: yes  Performed under: general  Guidance:ultrasound guided    ULTRASOUND INTERPRETATION.  Using ultrasound guidance a 20 G gauge needle was placed in close proximity to the nerve, at which point, under ultrasound guidance anesthetic was injected in the area of the nerve and spread of the anesthesia was seen on ultrasound in close proximity thereto.  There were no abnormalities seen on ultrasound; a digital image was taken; and the patient tolerated the procedure with no complications. Images:still images obtained    Laterality:Bilateral  Block Type:TAP  Injection Technique:single-shot  Needle Type:echogenic  Resistance on Injection: none    Medications Used: bupivacaine PF (MARCAINE) injection 0.5% - Injection   12.5 mL - 8/9/2023 1:24:00 PM   12.5 mL - 8/9/2023 1:27:00 PM      Medications  Preservative Free Saline:25ml  Comment:Block Injection: LA dose divided between Right and Left Block  Adjuncts per total volume of LA:        If required, intravenous sedation was given -- see meds on anesthesia record.    0.5% Marcaine mixed to make 0.25% marcaine     Post Assessment  Injection Assessment: negative aspiration for heme, no paresthesia on injection and incremental injection  Patient Tolerance:comfortable  throughout block  Complications:no  Additional Notes  Procedure:      BILATERAL TAP BLOCKS                             Patient analgesia was achieved with General Anesthesia    The pt was placed in the Supine Position and under Ultrasound guidance, an echogenic or touhy needle was advanced with Normal Saline hydro dissection of tissue.  The Internal Oblique and Transversus Abdominus muscles were visualized.  At or before the aponeurosis of Internal Oblique, the local anesthetic spread was visualized in the Transversus Abdominus Plane. Injection was made incrementally with aspiration every 5 mls.  There was no intravascular injection;  injection pressure was normal; there was no neural injection; and the procedure was completed without difficulty.

## 2023-08-09 NOTE — ANESTHESIA PROCEDURE NOTES
Airway  Urgency: elective    Date/Time: 8/9/2023 10:50 AM  Airway not difficult    General Information and Staff    Patient location during procedure: OR  CRNA/CAA: Elena Torres CRNA    Indications and Patient Condition  Indications for airway management: airway protection    Preoxygenated: yes  MILS not maintained throughout  Mask difficulty assessment: 0 - not attempted    Final Airway Details  Final airway type: endotracheal airway      Successful airway: ETT  Cuffed: yes   Successful intubation technique: direct laryngoscopy and RSI  Facilitating devices/methods: intubating stylet  Endotracheal tube insertion site: oral  Blade: Jalen  Blade size: 3  ETT size (mm): 7.0  Cormack-Lehane Classification: grade I - full view of glottis  Placement verified by: chest auscultation and capnometry   Cuff volume (mL): 5  Measured from: lips  ETT/EBT  to lips (cm): 20  Number of attempts at approach: 1  Assessment: lips, teeth, and gum same as pre-op and atraumatic intubation    Additional Comments  Negative epigastric sounds, Breath sound equal bilaterally with symmetric chest rise and fall

## 2023-08-10 ENCOUNTER — ANESTHESIA EVENT (OUTPATIENT)
Dept: ICU | Facility: HOSPITAL | Age: 78
DRG: 329 | End: 2023-08-10
Payer: MEDICARE

## 2023-08-10 LAB
ALBUMIN SERPL-MCNC: 3.6 G/DL (ref 3.5–5.2)
ALBUMIN/GLOB SERPL: 1.6 G/DL
ALP SERPL-CCNC: 45 U/L (ref 39–117)
ALT SERPL W P-5'-P-CCNC: 9 U/L (ref 1–33)
ANION GAP SERPL CALCULATED.3IONS-SCNC: 6.9 MMOL/L (ref 5–15)
AST SERPL-CCNC: 14 U/L (ref 1–32)
BILIRUB SERPL-MCNC: 0.3 MG/DL (ref 0–1.2)
BUN SERPL-MCNC: 30 MG/DL (ref 8–23)
BUN/CREAT SERPL: 41.7 (ref 7–25)
CALCIUM SPEC-SCNC: 7.7 MG/DL (ref 8.6–10.5)
CHLORIDE SERPL-SCNC: 107 MMOL/L (ref 98–107)
CO2 SERPL-SCNC: 37.1 MMOL/L (ref 22–29)
CREAT SERPL-MCNC: 0.72 MG/DL (ref 0.57–1)
D-LACTATE SERPL-SCNC: 1.6 MMOL/L (ref 0.5–2)
DEPRECATED RDW RBC AUTO: 56.8 FL (ref 37–54)
EGFRCR SERPLBLD CKD-EPI 2021: 86.8 ML/MIN/1.73
ERYTHROCYTE [DISTWIDTH] IN BLOOD BY AUTOMATED COUNT: 18.9 % (ref 12.3–15.4)
GLOBULIN UR ELPH-MCNC: 2.2 GM/DL
GLUCOSE BLDC GLUCOMTR-MCNC: 151 MG/DL (ref 70–130)
GLUCOSE SERPL-MCNC: 155 MG/DL (ref 65–99)
HCT VFR BLD AUTO: 35.6 % (ref 34–46.6)
HGB BLD-MCNC: 9.6 G/DL (ref 12–15.9)
MCH RBC QN AUTO: 22.6 PG (ref 26.6–33)
MCHC RBC AUTO-ENTMCNC: 27 G/DL (ref 31.5–35.7)
MCV RBC AUTO: 83.8 FL (ref 79–97)
PLATELET # BLD AUTO: 172 10*3/MM3 (ref 140–450)
PMV BLD AUTO: 11.5 FL (ref 6–12)
POTASSIUM SERPL-SCNC: 3.8 MMOL/L (ref 3.5–5.2)
PROT SERPL-MCNC: 5.8 G/DL (ref 6–8.5)
RBC # BLD AUTO: 4.25 10*6/MM3 (ref 3.77–5.28)
SODIUM SERPL-SCNC: 151 MMOL/L (ref 136–145)
WBC NRBC COR # BLD: 6.29 10*3/MM3 (ref 3.4–10.8)

## 2023-08-10 PROCEDURE — 99232 SBSQ HOSP IP/OBS MODERATE 35: CPT | Performed by: INTERNAL MEDICINE

## 2023-08-10 PROCEDURE — 25010000002 AMIODARONE IN DEXTROSE 5% 360-4.14 MG/200ML-% SOLUTION: Performed by: INTERNAL MEDICINE

## 2023-08-10 PROCEDURE — 97162 PT EVAL MOD COMPLEX 30 MIN: CPT

## 2023-08-10 PROCEDURE — 82948 REAGENT STRIP/BLOOD GLUCOSE: CPT

## 2023-08-10 PROCEDURE — 25010000002 KETOROLAC TROMETHAMINE PER 15 MG: Performed by: INTERNAL MEDICINE

## 2023-08-10 PROCEDURE — 85027 COMPLETE CBC AUTOMATED: CPT | Performed by: INTERNAL MEDICINE

## 2023-08-10 PROCEDURE — 25010000002 HYDROMORPHONE 1 MG/ML SOLUTION: Performed by: INTERNAL MEDICINE

## 2023-08-10 PROCEDURE — 25010000002 HYDRALAZINE PER 20 MG: Performed by: INTERNAL MEDICINE

## 2023-08-10 PROCEDURE — 99024 POSTOP FOLLOW-UP VISIT: CPT | Performed by: STUDENT IN AN ORGANIZED HEALTH CARE EDUCATION/TRAINING PROGRAM

## 2023-08-10 PROCEDURE — 97166 OT EVAL MOD COMPLEX 45 MIN: CPT

## 2023-08-10 PROCEDURE — 80053 COMPREHEN METABOLIC PANEL: CPT | Performed by: INTERNAL MEDICINE

## 2023-08-10 PROCEDURE — 25010000002 LORAZEPAM PER 2 MG: Performed by: INTERNAL MEDICINE

## 2023-08-10 PROCEDURE — 25010000002 ENOXAPARIN PER 10 MG: Performed by: INTERNAL MEDICINE

## 2023-08-10 PROCEDURE — 83605 ASSAY OF LACTIC ACID: CPT | Performed by: INTERNAL MEDICINE

## 2023-08-10 RX ORDER — SODIUM CHLORIDE 0.9 % (FLUSH) 0.9 %
10 SYRINGE (ML) INJECTION EVERY 12 HOURS SCHEDULED
Status: DISCONTINUED | OUTPATIENT
Start: 2023-08-10 | End: 2023-08-17

## 2023-08-10 RX ORDER — METOPROLOL TARTRATE 5 MG/5ML
5 INJECTION INTRAVENOUS EVERY 6 HOURS
Status: DISCONTINUED | OUTPATIENT
Start: 2023-08-10 | End: 2023-08-14

## 2023-08-10 RX ORDER — METOPROLOL TARTRATE 5 MG/5ML
5 INJECTION INTRAVENOUS EVERY 4 HOURS PRN
Status: DISCONTINUED | OUTPATIENT
Start: 2023-08-10 | End: 2023-08-13

## 2023-08-10 RX ORDER — SODIUM CHLORIDE 0.9 % (FLUSH) 0.9 %
20 SYRINGE (ML) INJECTION AS NEEDED
Status: DISCONTINUED | OUTPATIENT
Start: 2023-08-10 | End: 2023-08-18 | Stop reason: HOSPADM

## 2023-08-10 RX ORDER — SODIUM CHLORIDE 9 MG/ML
40 INJECTION, SOLUTION INTRAVENOUS AS NEEDED
Status: DISCONTINUED | OUTPATIENT
Start: 2023-08-10 | End: 2023-08-18 | Stop reason: HOSPADM

## 2023-08-10 RX ORDER — SODIUM CHLORIDE 0.9 % (FLUSH) 0.9 %
10 SYRINGE (ML) INJECTION AS NEEDED
Status: DISCONTINUED | OUTPATIENT
Start: 2023-08-10 | End: 2023-08-18 | Stop reason: HOSPADM

## 2023-08-10 RX ADMIN — HYDRALAZINE HYDROCHLORIDE 10 MG: 20 INJECTION, SOLUTION INTRAMUSCULAR; INTRAVENOUS at 16:26

## 2023-08-10 RX ADMIN — AMIODARONE HYDROCHLORIDE 0.5 MG/MIN: 1.8 INJECTION, SOLUTION INTRAVENOUS at 03:35

## 2023-08-10 RX ADMIN — Medication 10 ML: at 21:33

## 2023-08-10 RX ADMIN — KETOROLAC TROMETHAMINE 15 MG: 30 INJECTION, SOLUTION INTRAMUSCULAR; INTRAVENOUS at 16:47

## 2023-08-10 RX ADMIN — HYDROMORPHONE HYDROCHLORIDE 2 MG: 1 INJECTION, SOLUTION INTRAMUSCULAR; INTRAVENOUS; SUBCUTANEOUS at 17:07

## 2023-08-10 RX ADMIN — ENOXAPARIN SODIUM 70 MG: 100 INJECTION SUBCUTANEOUS at 08:25

## 2023-08-10 RX ADMIN — METOPROLOL TARTRATE 5 MG: 1 INJECTION, SOLUTION INTRAVENOUS at 20:22

## 2023-08-10 RX ADMIN — KETOROLAC TROMETHAMINE 15 MG: 30 INJECTION, SOLUTION INTRAMUSCULAR; INTRAVENOUS at 07:17

## 2023-08-10 RX ADMIN — AMIODARONE HYDROCHLORIDE 0.5 MG/MIN: 1.8 INJECTION, SOLUTION INTRAVENOUS at 15:56

## 2023-08-10 RX ADMIN — DILTIAZEM HYDROCHLORIDE 18.8 MG: 5 INJECTION INTRAVENOUS at 04:34

## 2023-08-10 RX ADMIN — METOPROLOL TARTRATE 5 MG: 1 INJECTION, SOLUTION INTRAVENOUS at 23:46

## 2023-08-10 RX ADMIN — Medication 10 ML: at 20:23

## 2023-08-10 RX ADMIN — LORAZEPAM 1 MG: 2 INJECTION INTRAMUSCULAR; INTRAVENOUS at 02:04

## 2023-08-10 RX ADMIN — Medication 10 ML: at 08:30

## 2023-08-10 RX ADMIN — Medication 2 SPRAY: at 19:41

## 2023-08-10 RX ADMIN — METOPROLOL TARTRATE 5 MG: 1 INJECTION, SOLUTION INTRAVENOUS at 08:25

## 2023-08-10 RX ADMIN — METOPROLOL TARTRATE 5 MG: 1 INJECTION, SOLUTION INTRAVENOUS at 15:37

## 2023-08-10 RX ADMIN — HYDROMORPHONE HYDROCHLORIDE 1 MG: 1 INJECTION, SOLUTION INTRAMUSCULAR; INTRAVENOUS; SUBCUTANEOUS at 03:18

## 2023-08-10 RX ADMIN — SODIUM CHLORIDE, POTASSIUM CHLORIDE, SODIUM LACTATE AND CALCIUM CHLORIDE 100 ML/HR: 600; 310; 30; 20 INJECTION, SOLUTION INTRAVENOUS at 15:37

## 2023-08-10 RX ADMIN — HYDROMORPHONE HYDROCHLORIDE 1 MG: 1 INJECTION, SOLUTION INTRAMUSCULAR; INTRAVENOUS; SUBCUTANEOUS at 14:00

## 2023-08-10 RX ADMIN — Medication 10 ML: at 09:05

## 2023-08-10 RX ADMIN — HYDROMORPHONE HYDROCHLORIDE 2 MG: 1 INJECTION, SOLUTION INTRAMUSCULAR; INTRAVENOUS; SUBCUTANEOUS at 21:32

## 2023-08-10 RX ADMIN — HYDROMORPHONE HYDROCHLORIDE 1 MG: 1 INJECTION, SOLUTION INTRAMUSCULAR; INTRAVENOUS; SUBCUTANEOUS at 09:05

## 2023-08-10 RX ADMIN — KETOROLAC TROMETHAMINE 15 MG: 30 INJECTION, SOLUTION INTRAMUSCULAR; INTRAVENOUS at 22:51

## 2023-08-10 RX ADMIN — HYDROMORPHONE HYDROCHLORIDE 1 MG: 1 INJECTION, SOLUTION INTRAMUSCULAR; INTRAVENOUS; SUBCUTANEOUS at 00:22

## 2023-08-10 RX ADMIN — METOPROLOL TARTRATE 5 MG: 1 INJECTION, SOLUTION INTRAVENOUS at 07:16

## 2023-08-10 RX ADMIN — HYDROMORPHONE HYDROCHLORIDE 2 MG: 1 INJECTION, SOLUTION INTRAMUSCULAR; INTRAVENOUS; SUBCUTANEOUS at 23:46

## 2023-08-10 RX ADMIN — HYDRALAZINE HYDROCHLORIDE 10 MG: 20 INJECTION, SOLUTION INTRAMUSCULAR; INTRAVENOUS at 07:17

## 2023-08-10 RX ADMIN — HYDROMORPHONE HYDROCHLORIDE 2 MG: 1 INJECTION, SOLUTION INTRAMUSCULAR; INTRAVENOUS; SUBCUTANEOUS at 19:41

## 2023-08-10 RX ADMIN — SODIUM CHLORIDE, POTASSIUM CHLORIDE, SODIUM LACTATE AND CALCIUM CHLORIDE 100 ML/HR: 600; 310; 30; 20 INJECTION, SOLUTION INTRAVENOUS at 05:23

## 2023-08-10 RX ADMIN — ENOXAPARIN SODIUM 70 MG: 100 INJECTION SUBCUTANEOUS at 20:22

## 2023-08-10 RX ADMIN — METOPROLOL TARTRATE 2.5 MG: 1 INJECTION, SOLUTION INTRAVENOUS at 02:18

## 2023-08-10 NOTE — CASE MANAGEMENT/SOCIAL WORK
Discharge Planning Assessment  [facility]     Patient Name: Gabi Dudley  MRN: [id number]  Today's Date: 8/10/2023    Admit Date: 8/7/2023    Plan: Plans to return home with daughter. Has own transportation.   Discharge Needs Assessment    No documentation.                  Discharge Plan       Row Name 08/10/23 1511       Plan    Plan Comments home with daughter  will need home health at discharge                  Continued Care and Services - Admitted Since 8/7/2023       Durable Medical Equipment       Service Provider Request Status Selected Services Address Phone Fax Patient Preferred    MARIELENA CHUNG Pending - No Request Sent N/A [facility] [address] [phone] --    [facility] Pending - No Request Sent N/A -- [phone] --                  Selected Continued Care - Episodes Includes continued care and service providers with selected services from the active episodes listed below      High Risk Care Management Episode start date: 7/26/2023   There are no active outsourced providers for this episode.                 Selected Continued Care - Prior Encounters Includes continued care and service providers with selected services from prior encounters from 5/9/2023 to 8/10/2023      Discharged on 5/11/2023 Admission date: 5/5/2023 - Discharge disposition: Skilled Nursing Facility (DC - External)      Destination       Service Provider Selected Services Address Phone Fax Patient Preferred    [facility] [address] [facility] [id number] [phone] --              Durable Medical Equipment       Service Provider Selected Services Address Phone Fax Patient Preferred    [facility] Durable Medical Equipment -- [phone] --       Internal Comment last updated by Malu Holden, APRN 5/5/2023 1518    Supplies pt NIV               MARIELENA CHUNG  Durable Medical Equipment [address] [place] [phone] --       Internal Comment last updated by Malu Holden, APRN 5/5/2023 1519    Supplies pt O2                                     Expected Discharge Date and Time       Expected Discharge Date Expected Discharge Time    Aug 12, 2023            Demographic Summary    No documentation.                  Functional Status    No documentation.                  Psychosocial    No documentation.                  Abuse/Neglect    No documentation.                  Legal    No documentation.                  Substance Abuse    No documentation.                  Patient Forms    No documentation.                     Pamela De RN

## 2023-08-10 NOTE — PLAN OF CARE
"Goal Outcome Evaluation:  Plan of Care Reviewed With: patient        Progress: no change  Outcome Evaluation: OT eval completed. Patient supine in bed, sleeping soundly; is on O2, NG tube and urinary catheter intact. Patient awakens to name and is able to state her name, further orientation unable to obtain. Patient is confused, with speech being non-sensical and decreased ability to follow commands, mostly keeps eyes closed. Patient moved to EOB with TD x 2, is agitated and fearful sitting EOB and repeatedly says \"let me go, let me go\". Patient is too drowsy and confused to advance mobility further this date. Patient returned to supine and placed in R sidelying with pillows placed for pressure reduction. Notified RN of patient's performance. Patient to continue with OT services prior to DC, advancing mobility/participation with functional tasks as appropriate in order to return to PLOF.      Anticipated Discharge Disposition (OT): inpatient rehabilitation facility  "

## 2023-08-10 NOTE — PROGRESS NOTES
[facility]    Name:  Gabi Dudley   Age:  78 y.o.  Sex:  female  :  1945  MRN:  [id number]   Visit Number:  [id number]  Admission Date:  2023  Date Of Service:  08/10/23  Primary Care Physician:  Paul Quinteros MD     LOS: 1 day :    Chief Complaint:      Pop pain    Subjective:    8/10/2023: Patient converted to sinus rhythm this morning.  Dr. Curry's note reviewed.  Was taken to the OR for adhesiolysis by Dr. Isaac with assist from Dr. Bowden last evening.  Currently n.p.o. with NG tube in place.  Abdominal pain persist receiving IV pain medicine including Dilaudid and Toradol.  Discussed with nursing.  Hospital course:  2023 patient with RVR overnight was started on Cardizem but that did not help.  Persistent pain discussed with Dr. Isaac taking  to the OR.  Discussed with Dr. Curry who recommended amiodarone and discussed perioperative risk management with Dr. Isaac.  2023 Pain not well controlled increased morphine dose from 1 mg to 2 mg    History Of Presenting Illness:       Patient is a chronically ill 78-year-old female history significant for hypertension, hyperlipidemia, depression/anxiety and CAD who presents to the emergency room with 3 to 4 days of progressively worsening abdominal pain.  Patient describes the pain as intermittent and sharp, diffuse across her abdomen.  Patient has chronic constipation so cannot recall her last bowel movement.  Admits to some mild nausea, denies vomiting.  Patient does have a history of small bowel obstruction which required colectomy .  States pain is similar but not as severe as previous episode.  Nothing makes it better or worse.  Patient does admit to still passing gas.  Upon arrival to the ER, patient afebrile hemodynamically stable and nonhypoxic.  CMP unremarkable except for glucose 142.  Lactate lipase normal.  WBC 13, hemoglobin hematocrit at baseline, platelets 112.  UA negative for  urinary tract infection.  CT abdomen pelvis revealed mid small bowel dilation worrisome for partial small bowel obstruction.  Dr. Isaac agreed to consult hospital service asked to admit.  Edited by: Milton Urban DO at 8/10/2023 1108     Review of Systems:     All systems were reviewed and negative except as mentioned in subjective, assessment and plan.    Vital Signs:    Temp:  [97.5 øF (36.4 øC)-98.3 øF (36.8 øC)] 98 øF (36.7 øC)  Heart Rate:  [] 90  Resp:  [12-20] 14  BP: (107-180)/() 119/65  FiO2 (%):  [50 %] 50 %    Intake and output:    I/O last 3 completed shifts:  In: 81669 [I.V.:9725; Blood:300]  Out: 1450 [Urine:500; Emesis/NG output:900; Blood:50]  I/O this shift:  In: -   Out: 80 [Urine:80]    Physical Examination:    Physical Exam  Vitals reviewed.   Constitutional:       Appearance: Ill-appearing but slightly improved since yesterday  HENT:      Head: Normocephalic and atraumatic.      Right Ear: External ear normal.      Left Ear: External ear normal.      Mouth/Throat:      Mouth: Mucous membranes are moist.      Pharynx: Oropharynx is clear.   Eyes:      Extraocular Movements: Extraocular movements intact.      Conjunctiva/sclera: Conjunctivae normal.   Cardiovascular:      Rate and Rhythm: Normal rate and regular rhythm.      Pulses: Normal pulses.      Heart sounds: Normal heart sounds.   Pulmonary:      Effort: Pulmonary effort is normal.      Breath sounds: Expiratory wheeze noted bilaterally.  Abdominal:      General: Bowel sounds are decreased.  Mild distension.     Comments: Moderate diffuse abdominal tenderness  Musculoskeletal:         General: Normal range of motion.      Right lower leg: No edema.      Left lower leg: No edema.   Skin:     General: Skin is warm and dry.   Neurological:      Mental Status: She decreased level of consciousness, answers questions appropriately  Psychiatric:         Behavior: Behavior normal  Edited by: Milton Urban DO at 8/10/2023  1108     Laboratory results:    Results from last 7 days   Lab Units 08/10/23  0342 08/09/23  0701 08/08/23  0523 08/07/23  1535   SODIUM mmol/L 151* 146* 146* 145   POTASSIUM mmol/L 3.8 3.6 4.0 4.4   CHLORIDE mmol/L 107 103 104 102   CO2 mmol/L 37.1* 37.9* 35.8* 34.8*   BUN mg/dL 30* 29* 24* 23   CREATININE mg/dL 0.72 0.54* 0.55* 0.65   CALCIUM mg/dL 7.7* 8.1* 8.7 9.2   BILIRUBIN mg/dL 0.3  --   --  0.3   ALK PHOS U/L 45  --   --  59   ALT (SGPT) U/L 9  --   --  12   AST (SGOT) U/L 14  --   --  19   GLUCOSE mg/dL 155* 115* 142* 142*     Results from last 7 days   Lab Units 08/10/23  0342 08/09/23  0701 08/08/23  0523   WBC 10*3/mm3 6.29 4.05 11.28*   HEMOGLOBIN g/dL 9.6* 7.8* 8.5*   HEMATOCRIT % 35.6 29.4* 31.8*   PLATELETS 10*3/mm3 172 143 160                 Recent Labs     11/21/22  1027 01/23/23  0050 05/22/23  1843   PHART 7.443 7.334* 7.318*   TSI9GBC 47.0* 67.6* 74.3*   PO2ART 68.9* 156.0* 69.2*   BDZ1LOF 32.1* 36.0* 38.1*   BASEEXCESS 7.1* 8.7* 10.6*      I have reviewed the patient's laboratory results.    Radiology results:    CT Abdomen Pelvis With Contrast    Result Date: 8/8/2023  FINAL REPORT TECHNIQUE: Routine axial images through the abdomen and pelvis were obtained following IV contrast administration. CLINICAL HISTORY: Small bowel obstruction FOLLOW UP FROM YESTERDAY ORAL CONTRAST GIVEN VIA NG TUBE 6 HOURS PRIOR. COMPARISON: 8/7/2023 FINDINGS: Abdomen: The gallbladder has been removed.  Again seen are bilateral renal cysts.  There is a 2.9 cm left adrenal nodule, likely an adenoma in the absence of a known malignancy.  The other solid abdominal organs and ureters are unremarkable. There are moderately dilated multiple loops of proximal and mid small bowel with collapsed distal small bowel consistent with a high-grade small bowel obstruction.  No convincing passage of contrast into the collapsed distal small bowel.  There are postoperative changes of the cecum, possibly an appendectomy. There is  left-sided diverticulosis.  The GI tract is otherwise unremarkable.  Pelvis: The urinary bladder is unremarkable.  The uterus and ovaries are not visualized.  There is no pelvic or abdominal ascites, adenopathy or acute osseous abnormality.     Impression: High-grade distal small bowel obstruction. Authenticated and Electronically Signed by Fidel Knapp M.D. on 08/08/2023 07:47:16 PM    XR Chest 1 View    Result Date: 8/9/2023  PROCEDURE: XR CHEST 1 VW-  HISTORY: Line placement; K56.600-Partial intestinal obstruction, unspecified as to cause; I48.91-Unspecified atrial fibrillation  COMPARISON: None.  FINDINGS: Nasogastric tube seen with the tip coursing below the level the diaphragm. There is a new right IJ catheter with the tip terminate in the mid SVC. The patient is rotated to the right. The heart is normal in size. The mediastinum is unremarkable. There is stable mild interstitial disease within the lungs. There is no pneumothorax.  There are no acute osseous abnormalities.      Impression: Stable mild interstitial disease.  Support tubes and lines as described.     Images were reviewed, interpreted, and dictated by Dr. Angeles Collins MD Transcribed by Mickey Perez PA-C.  This report was signed and finalized on 8/9/2023 3:17 PM by Angeles Collins MD.      XR Chest 1 View    Result Date: 8/9/2023  PROCEDURE: XR CHEST 1 VW-  HISTORY: wheezing; K56.600-Partial intestinal obstruction, unspecified as to cause; I48.91-Unspecified atrial fibrillation  COMPARISON: July 30, 2023.  FINDINGS: The heart is enlarged, stable from the prior exam. NG tube has been placed since the prior study and extends below the level the diaphragm below the inferior extent of this image. Mild elevation of the right hemidiaphragm noted. No acute infiltrate seen.. . The mediastinum is unremarkable. There is no pneumothorax.  There are no acute osseous abnormalities.      Impression: Interval placement of NG tube extending below the level of the  diaphragm otherwise stable chest..      This report was signed and finalized on 8/9/2023 10:11 AM by Angeles Collins MD.      Peripheral Block    Result Date: 8/9/2023  Elena Torres CRNA     8/9/2023 12:46 PM Peripheral Block Pre-sedation assessment completed: 8/9/2023 10:15 AM Patient reassessed immediately prior to procedure Start time: 8/9/2023 1:23 PM Stop time: 8/9/2023 1:27 PM Reason for block: at surgeon's request and post-op pain management Performed by CRNA/CAA: Elena Torres CRNA Preanesthetic Checklist Completed: patient identified, IV checked, site marked, risks and benefits discussed, surgical consent, monitors and equipment checked, pre-op evaluation and timeout performed Prep: Pt Position: supine Sterile barriers:gloves, cap, sterile barriers and mask Prep: ChloraPrep Patient monitoring: blood pressure monitoring, continuous pulse oximetry and EKG Procedure Sedation: yes Performed under: general Guidance:ultrasound guided ULTRASOUND INTERPRETATION.  Using ultrasound guidance a 20 G gauge needle was placed in close proximity to the nerve, at which point, under ultrasound guidance anesthetic was injected in the area of the nerve and spread of the anesthesia was seen on ultrasound in close proximity thereto.  There were no abnormalities seen on ultrasound; a digital image was taken; and the patient tolerated the procedure with no complications. Images:still images obtained Laterality:Bilateral Block Type:TAP Injection Technique:single-shot Needle Type:echogenic Resistance on Injection: none Medications Used: bupivacaine PF (MARCAINE) injection 0.5% - Injection  12.5 mL - 8/9/2023 1:24:00 PM  12.5 mL - 8/9/2023 1:27:00 PM Medications Preservative Free Saline:25ml Comment:Block Injection: LA dose divided between Right and Left Block Adjuncts per total volume of LA: If required, intravenous sedation was given -- see meds on anesthesia record. 0.5% Marcaine mixed to make 0.25% marcaine Post  Assessment Injection Assessment: negative aspiration for heme, no paresthesia on injection and incremental injection Patient Tolerance:comfortable throughout block Complications:no Additional Notes Procedure:      BILATERAL TAP BLOCKS                          Patient analgesia was achieved with General Anesthesia The pt was placed in the Supine Position and under Ultrasound guidance, an echogenic or touhy needle was advanced with Normal Saline hydro dissection of tissue.  The Internal Oblique and Transversus Abdominus muscles were visualized.  At or before the aponeurosis of Internal Oblique, the local anesthetic spread was visualized in the Transversus Abdominus Plane. Injection was made incrementally with aspiration every 5 mls.  There was no intravascular injection;  injection pressure was normal; there was no neural injection; and the procedure was completed without difficulty.    I have reviewed the patient's radiology reports.    Medication Review:     I have reviewed the patient's active and prn medications.     Problem List:      Partial small bowel obstruction      Assessment/Plan:    Partial small bowel obstruction.    History of colectomy due to obstruction in 2018  Paroxysmal A-fib with RVR   Hypertension  Hyperlipidemia  Depression/anxiety  GERD    -Status post adhesiolysis 8/9/2023 Dr. Isaac  -NG tube in place  -Continue IV amiodarone per Dr. Curry's recommendations transition to oral once tolerating  -Increased wheezing check a chest x-ray        Edited by: Milton Urban DO at 8/10/2023 1108       DVT Prophylaxis: Lovenox  Code Status:   Code Status and Medical Interventions:   Ordered at: 08/07/23 2023     Code Status (Patient has no pulse and is not breathing):    CPR (Attempt to Resuscitate)     Medical Interventions (Patient has pulse or is breathing):    Full Support     Release to patient:    Routine Release      Diet:   Dietary Orders (From admission, onward)       Start     Ordered     08/07/23 2023  NPO Diet NPO Type: Strict NPO  Diet Effective Now        Question:  NPO Type  Answer:  Strict NPO    08/07/23 2023                   Discharge Plan: Home at discharge in 3 to 4 days, consult PT OT    Milton Urban DO  08/10/23  11:11 EDT    Dictated utilizing Dragon dictation.

## 2023-08-10 NOTE — PROGRESS NOTES
[facility] Cardiology IP Progress Note    Gabi Dudley  1945  [id number]  08/10/23    Subjective:   Mrs. Gabi Dudley is a 78 y.o. female seen in follow-up for paroxysmal atrial fibrillation with rapid ventricular rates.  No acute overnight events.  On review of telemetry, had brief recurrent PAF with RVR overnight.  Currently in sinus rhythm.  This morning, the patient reports she is feeling somewhat better postoperatively.  Currently denies palpitations.  No other specific complaints.    Review of Systems:   Review of Systems   Constitutional:  Positive for fatigue. Negative for activity change, appetite change, chills, diaphoresis, fever, unexpected weight gain and unexpected weight loss.   Respiratory:  Negative for cough, chest tightness, shortness of breath and wheezing.    Cardiovascular:  Negative for chest pain, palpitations and leg swelling.   Gastrointestinal:  Positive for abdominal pain. Negative for anal bleeding, blood in stool and GERD.   Genitourinary:  Negative for hematuria.   Neurological:  Negative for dizziness, syncope, weakness and light-headedness.   Hematological:  Does not bruise/bleed easily.   Psychiatric/Behavioral:  Negative for depressed mood and stress. The patient is not nervous/anxious.      I have reviewed and/or updated the patient's past medical, past surgical, family history, social history, problem list and allergies as appropriate in the chart.     Physical Exam:  Vital Signs:   Vitals:    08/10/23 0842 08/10/23 0845 08/10/23 0900 08/10/23 0915   BP:       BP Location:       Patient Position:       Pulse: 84 85 88 89   Resp:       Temp:       TempSrc:       SpO2:  97% 96% 96%   Weight:       Height:           Physical Exam  Vitals and nursing note reviewed.   Constitutional:       General: She is not in acute distress.     Appearance: She is well-developed. She is not diaphoretic.   HENT:      Head: Normocephalic and atraumatic.   Eyes:       General: No scleral icterus.     Pupils: Pupils are equal, round, and reactive to light.   Neck:      Trachea: No tracheal deviation.   Cardiovascular:      Rate and Rhythm: Normal rate and regular rhythm.      Heart sounds: Normal heart sounds. No murmur heard.    No friction rub. No gallop.      Comments: Normal JVD.  Pulmonary:      Effort: Pulmonary effort is normal. No respiratory distress.      Breath sounds: Normal breath sounds. No stridor. No wheezing or rales.   Chest:      Chest wall: No tenderness.   Abdominal:      General: Bowel sounds are normal. There is no distension.      Palpations: Abdomen is soft.      Tenderness: There is no abdominal tenderness. There is no guarding or rebound.   Musculoskeletal:         General: No swelling. Normal range of motion.   Skin:     General: Skin is warm and dry.      Findings: No erythema.   Neurological:      General: No focal deficit present.      Mental Status: She is alert and oriented to person, place, and time.   Psychiatric:         Mood and Affect: Mood normal.         Behavior: Behavior normal.       Results Review:   Results from last 7 days   Lab Units 08/10/23  0342   SODIUM mmol/L 151*   POTASSIUM mmol/L 3.8   CHLORIDE mmol/L 107   CO2 mmol/L 37.1*   BUN mg/dL 30*   CREATININE mg/dL 0.72   CALCIUM mg/dL 7.7*   BILIRUBIN mg/dL 0.3   ALK PHOS U/L 45   ALT (SGPT) U/L 9   AST (SGOT) U/L 14   GLUCOSE mg/dL 155*         Results from last 7 days   Lab Units 08/10/23  0342 08/09/23  0701 08/08/23  0523   WBC 10*3/mm3 6.29 4.05 11.28*   HEMOGLOBIN g/dL 9.6* 7.8* 8.5*   HEMATOCRIT % 35.6 29.4* 31.8*   PLATELETS 10*3/mm3 172 143 160                   I personally viewed and interpreted the patient's EKG/Telemetry data     Medications:   )  Current Facility-Administered Medications:     acetaminophen (TYLENOL) tablet 650 mg, 650 mg, Oral, Q4H PRN **OR** acetaminophen (TYLENOL) 160 MG/5ML solution 650 mg, 650 mg, Oral, Q4H PRN **OR** acetaminophen (TYLENOL)  suppository 650 mg, 650 mg, Rectal, Q4H PRN, Milton Urban,     sennosides-docusate (PERICOLACE) 8.6-50 MG per tablet 2 tablet, 2 tablet, Oral, BID, 2 tablet at 08/09/23 2127 **AND** polyethylene glycol (MIRALAX) packet 17 g, 17 g, Oral, Daily PRN **AND** bisacodyl (DULCOLAX) EC tablet 5 mg, 5 mg, Oral, Daily PRN **AND** bisacodyl (DULCOLAX) suppository 10 mg, 10 mg, Rectal, Daily PRN, Milton Urban,     dilTIAZem (CARDIZEM) injection 10 mg, 10 mg, Intravenous, Q6H PRN, Milton Urban, , 10 mg at 08/09/23 2136    Enoxaparin Sodium (LOVENOX) syringe 70 mg, 1 mg/kg, Subcutaneous, Q12H, Milton Urban, , 70 mg at 08/10/23 0825    glycerin 35 % liquid 35% 2 spray, 2 spray, Mouth/Throat, Q2H PRN, Milton Urban, DO, 2 spray at 08/08/23 2128    hydrALAZINE (APRESOLINE) injection 10 mg, 10 mg, Intravenous, Q4H PRN, Lenny Dewitt MD, 10 mg at 08/10/23 0717    HYDROmorphone (DILAUDID) injection 1 mg, 1 mg, Intravenous, Q2H PRN, Lenny Dewitt MD, 1 mg at 08/10/23 0905    ipratropium-albuterol (DUO-NEB) nebulizer solution 1.5 mL, 1.5 mL, Nebulization, Q4H PRN, Milton Urban,     ipratropium-albuterol (DUO-NEB) nebulizer solution 3 mL, 3 mL, Nebulization, Once PRN, Milton Urban,     ketorolac (TORADOL) injection 15 mg, 15 mg, Intravenous, Q6H PRN, Milton Urban, , 15 mg at 08/10/23 0717    lactated ringers infusion 1,000 mL, 1,000 mL, Intravenous, Continuous, Poncho, Bianca B, DO, Last Rate: 25 mL/hr at 08/09/23 1015, Restarted at 08/09/23 1026    lactated ringers infusion, 100 mL/hr, Intravenous, Continuous, Milton Urban DO, Last Rate: 100 mL/hr at 08/10/23 0523, 100 mL/hr at 08/10/23 0523    LORazepam (ATIVAN) injection 1 mg, 1 mg, Intravenous, Q6H PRN, Milton Urban DO, 1 mg at 08/10/23 0204    melatonin tablet 5 mg, 5 mg, Oral, Nightly PRN, Milton Urban DO    metoprolol tartrate (LOPRESSOR) injection 5 mg, 5 mg, Intravenous, Q6H, Renate,  MD Lenny, 5 mg at 08/10/23 0825    metoprolol tartrate (LOPRESSOR) injection 5 mg, 5 mg, Intravenous, Q4H PRN, Milton Urban DO, 5 mg at 08/10/23 0716    [DISCONTINUED] Morphine sulfate (PF) injection 2 mg, 2 mg, Intravenous, Q4H PRN, 2 mg at 08/09/23 2016 **AND** naloxone (NARCAN) injection 0.4 mg, 0.4 mg, Intravenous, Q5 Min PRN, Milton Urban, DO    nitroglycerin (NITROSTAT) SL tablet 0.4 mg, 0.4 mg, Sublingual, Q5 Min PRN, Milton Urban,     ondansetron (ZOFRAN) injection 4 mg, 4 mg, Intravenous, Q6H PRN, Milton Urban, , 4 mg at 08/09/23 0057    ondansetron (ZOFRAN) injection 4 mg, 4 mg, Intravenous, Once PRN, Milton Urban,     Pharmacy to Dose enoxaparin (LOVENOX), , Does not apply, Continuous PRN, Milton Urban,     prochlorperazine (COMPAZINE) injection 5 mg, 5 mg, Intravenous, Q6H PRN, 5 mg at 08/09/23 0308 **OR** prochlorperazine (COMPAZINE) tablet 5 mg, 5 mg, Oral, Q6H PRN **OR** prochlorperazine (COMPAZINE) suppository 25 mg, 25 mg, Rectal, Q12H PRN, Milton Urban, DO    sodium chloride 0.9 % flush 10 mL, 10 mL, Intravenous, PRN, Milton Urban, DO, 10 mL at 08/10/23 0905    sodium chloride 0.9 % flush 10 mL, 10 mL, Intravenous, Q12H, Milton Urban, DO, 10 mL at 08/10/23 0830    sodium chloride 0.9 % flush 10 mL, 10 mL, Intravenous, PRN, Milton Urban, DO    sodium chloride 0.9 % infusion 40 mL, 40 mL, Intravenous, PRN, Milton Urban, DO    Assessment:  1.  Paroxysmal atrial fibrillation with rapid ventricular rates  2.  Nonobstructive coronary artery disease  3.  Small bowel obstruction secondary to volvulus  4.  Hypertension  5.  Hyperlipidemia     Plan:  1.  Paroxysmal atrial fibrillation with rapid ventricular rates  -- Converted to sinus rhythm yesterday, brief recurrent PAF overnight however return to sinus rhythm this morning  -- Continue IV amiodarone for rhythm control while n.p.o.  -- Once tolerating p.o. intake,  transition to oral amiodarone loading  -- Restart home metoprolol once tolerating p.o.  -- Restart Eliquis when appropriate from a surgical standpoint for CVA prophylaxis      Thank you for allowing me to participate in the care of your patient. Please to not hesitate to contact me with additional questions or concerns.     VIDAL Curry MD  Interventional Cardiology   08/10/23  10:09 EDT

## 2023-08-10 NOTE — PROGRESS NOTES
LOS: 1 day   Patient Care Team:  Paul Quinteros MD as PCP - General (Internal Medicine)  Sima Moses MD as Consulting Physician (General Surgery)  Taiwo Curry MD as Consulting Physician (Cardiology)  Soledad Stauffer, EFRAÍN as Ambulatory  ([facility])       Chief Complaint: POD1 exploratory laparotomy, lysis of adhesions, reduction of small bowel volvulus    HPI: Patient seen and examined at bedside with nursing. She developed atrial fibrillation overnight again and then spontaneously converted to NSR this AM. Patient has had abdominal pain, controlled with dilaudid and toradol. She is somewhat encephalopathic. NG with about 400 cc dark gastric effluent overnight. UOP adequate approximately 80 cc/hr.     Vital Signs  Temp:  [97.5 øF (36.4 øC)-98.3 øF (36.8 øC)] 98 øF (36.7 øC)  Heart Rate:  [] 92  Resp:  [12-20] 14  BP: (107-180)/() 119/65  FiO2 (%):  [50 %] 50 %    Physical Exam:     General Appearance:  Alert, but somewhat confused.    Respiratory/Lungs:   Breath sounds heard bilaterally equally.  No crackles or wheezing. No Pleural rub or bronchial breathing. Normal respiratory effort.    Cardiovascular/Heart:  Normal S1 and S2, no murmur. Normal sinus.    GI/Abdomen:   Soft, appropriately tender to palpation, midline dressing clean, dry, and intact. Bowel sounds present in LUQ, hypoactive. NG in place with dark green gastric effluent in canister.                Musculoskeletal/ Extremities:   Moves all extremities well   Skin: Diffuse bruising at different stages of healing secondary to DVT ppx and recent falls at home   Psychiatric : Alert and oriented x 1-2   Neurologic: Cranial nerves 2 - 12 grossly intact, sensation intact, Motor power is normal and equal bilaterally.     Results Review:       Lab Results (last 24 hours)       Procedure Component Value Units Date/Time    CBC (No Diff) [[id number]]  (Abnormal) Collected: 08/10/23 0342    Specimen: Blood Updated:  08/10/23 0501     WBC 6.29 10*3/mm3      RBC 4.25 10*6/mm3      Hemoglobin 9.6 g/dL      Hematocrit 35.6 %      MCV 83.8 fL      MCH 22.6 pg      MCHC 27.0 g/dL      RDW 18.9 %      RDW-SD 56.8 fl      MPV 11.5 fL      Platelets 172 10*3/mm3     Comprehensive Metabolic Panel [[id number]]  (Abnormal) Collected: 08/10/23 0342    Specimen: Blood Updated: 08/10/23 0435     Glucose 155 mg/dL      BUN 30 mg/dL      Creatinine 0.72 mg/dL      Sodium 151 mmol/L      Potassium 3.8 mmol/L      Chloride 107 mmol/L      CO2 37.1 mmol/L      Calcium 7.7 mg/dL      Total Protein 5.8 g/dL      Albumin 3.6 g/dL      ALT (SGPT) 9 U/L      AST (SGOT) 14 U/L      Alkaline Phosphatase 45 U/L      Total Bilirubin 0.3 mg/dL      Globulin 2.2 gm/dL      A/G Ratio 1.6 g/dL      BUN/Creatinine Ratio 41.7     Anion Gap 6.9 mmol/L      eGFR 86.8 mL/min/1.73     Narrative:      GFR Normal >60  Chronic Kidney Disease <60  Kidney Failure <15    The GFR formula is only valid for adults with stable renal function between ages 18 and 70.    Lactic Acid, Plasma [[id number]]  (Normal) Collected: 08/10/23 0342    Specimen: Blood Updated: 08/10/23 0430     Lactate 1.6 mmol/L     POC Glucose Once [[id number]]  (Abnormal) Collected: 08/10/23 0315    Specimen: Blood Updated: 08/10/23 0316     Glucose 151 mg/dL      Comment: Serial Number: [id number]:  [id number]                   Assessment & Plan       Partial small bowel obstruction    Patient is POD1 exploratory laparotomy, adhesiolysis, and reduction of small bowel volvulus. She is doing well with the exception of abdominal pain that is somewhat difficult to control. She reports being on home narcotics for pain. Currently it appears to be well controlled with IV dilaudid and Toradol. I anticipate prolonged post operative ileus. Maintain NPO status and NG to LIS. She does have some hypoactive bowel sounds which is reassuring. Dr. Curry's recommendations appreciated, continue amiodarone gtt. Discontinue  Lui catheter and initiate external urinary catheter. Encourage ambulation, patient okay to work with PT/OT. Labs reviewed, hemoglobin stable after transfusion of 1 U PRBC 8/9/23. Anticipate one more day in ICU and possible transfer to telemetry floor tomorrow.     Bianca Isaac DO  08/10/23  11:18 EDT

## 2023-08-10 NOTE — CONSULTS
Patient: Gabi Dudley  Procedure(s):  LAPAROTOMY EXPLORATORY WITH LYIS OF ADHESIONS AND  CENTRAL LINE INSERTION  Anesthesia type: general, Michelle    Patient location: ICU  Last vitals:   Vitals:    08/10/23 0915   BP:    Pulse: 89   Resp:    Temp:    SpO2: 96%     Level of consciousness: awake, alert, and confused    Post-anesthesia pain: adequate analgesia  Airway patency: patent  Respiratory: nasal cannula  Cardiovascular: stable and pt in atrial fibrillation   Hydration: euvolemic    Anesthetic complications: no, pt sitting in bed on nasal cannula, is able to communicate, pain controlled with IV medication, RN at bedside

## 2023-08-10 NOTE — THERAPY EVALUATION
Patient Name: Gabi Dudley  : 1945    MRN: [id number]                              Today's Date: 8/10/2023       Admit Date: 2023    Visit Dx:     ICD-10-CM ICD-9-CM   1. Partial small bowel obstruction  K56.600 560.9   2. Atrial fibrillation with rapid ventricular response  I48.91 427.31     Patient Active Problem List   Diagnosis    Adrenal adenoma    Anxiety    Chronic fatigue    Fibromyalgia    Hyperlipidemia    Essential hypertension    Insomnia    Osteoarthritis of knee    Osteoporosis    Pulmonary emphysema    Simple renal cyst    Tension headache    Vitamin D deficiency    Diverticulosis    Inversion of nipple    Paroxysmal atrial fibrillation    Coronary artery disease involving native coronary artery of native heart without angina pectoris    Autonomic dysfunction    Gastroesophageal reflux disease without esophagitis    Urge incontinence    Erythrasma    Compression fracture of T5 vertebra    Lung nodule    COPD exacerbation    Overweight (BMI 25.0-29.9)    Acute on chronic respiratory failure with hypoxia    Acute on chronic respiratory failure with hypoxia and hypercapnia    Iron deficiency anemia    Impaired mobility and ADLs    Acute respiratory failure with hypoxia and hypercapnia    Aspiration pneumonia of right lower lobe    Partial small bowel obstruction     Past Medical History:   Diagnosis Date    Adrenal adenoma     Anemia     Arrhythmia     Asthma     Atrial fibrillation     Back pain     Benign colonic polyp     Benign tumor of adrenal gland     Cataract     bilateral    Cholelithiasis     Chronic bronchitis     Chronic bronchitis with COPD (chronic obstructive pulmonary disease)     COPD (chronic obstructive pulmonary disease)     Coronary artery disease     Depression     Diverticulosis Years ago    Elevated cholesterol     Environmental and seasonal allergies     Fibromyalgia     Fibromyalgia, primary Sometime in the s    Gastritis     Generalized anxiety disorder      GERD (gastroesophageal reflux disease)     H/O mammogram     Headache Years ago    Hemorrhoids     History of blood transfusion 1985    History of blood transfusion 1985    History of echocardiogram     History of endometriosis     History of nuclear stress test     Hospitalization or health care facility admission within last 6 months     5 times between 11/2022-05/2023    Hypertension     IBS (irritable bowel syndrome)     Impaired functional mobility, balance, gait, and endurance     Impaired mobility     Inverted nipple     Kidney disease     Kidney stone     Liver cyst     Low back pain Years ago    Nodular radiologic density     Nodule of left lung     On home oxygen therapy     2 liters NC QHS    Osteoarthritis     Osteopenia Several years ago    Osteoporosis     PONV (postoperative nausea and vomiting)     Renal cyst     Sinus problem     2014    Sinusitis     Skin cancer     basal cell carcinoma    SOB (shortness of breath)     Tobacco use     Urinary frequency     Urinary tract infection Years ago    Frequent UTIs    Vitamin D deficiency     Wears glasses     Wears partial dentures     upper plate     Past Surgical History:   Procedure Laterality Date    APPENDECTOMY  1980s    CARDIAC CATHETERIZATION  2003    CATARACT EXTRACTION  2013    both eyes    CHOLECYSTECTOMY  2020    CHOLECYSTECTOMY WITH INTRAOPERATIVE CHOLANGIOGRAM N/A 10/30/2020    Procedure: CHOLECYSTECTOMY LAPAROSCOPIC INTRAOPERATIVE CHOLANGIOGRAPHY;  Surgeon: Sima Moses MD;  Location: [facility] OR;  Service: General;  Laterality: N/A;    COLON SURGERY  2020    COLONOSCOPY  03/11/2013    COLONOSCOPY  06/21/2016    COLONOSCOPY N/A 07/24/2019    Procedure: COLONOSCOPY W/ COLD FORCEP POLYPECTOMIES; HOT SNARE POLYPECTOMIES; COLD SNARE POLYPECTOMY;  Surgeon: Gyoo Nunez MD;  Location: [facility] ENDOSCOPY;  Service: Gastroenterology    COLONOSCOPY W/ BIOPSIES AND POLYPECTOMY      EXPLORATORY LAPAROTOMY N/A 11/23/2020    Procedure: colectomy,  right, closure of enterotomy x 2, reduction of internal volvulus;  Surgeon: Sima Moses MD;  Location:  [facility] OR;  Service: General;  Laterality: N/A;    EXPLORATORY LAPAROTOMY N/A 8/9/2023    Procedure: LAPAROTOMY EXPLORATORY WITH LYIS OF ADHESIONS AND  CENTRAL LINE INSERTION;  Surgeon: Bianca Isaac DO;  Location:  [facility] OR;  Service: General;  Laterality: N/A;    HYSTERECTOMY  1980s    partial    LYSIS OF ABDOMINAL ADHESIONS      TONSILLECTOMY  1987    UPPER GASTROINTESTINAL ENDOSCOPY  01/13/2015    VAGINAL DELIVERY      x2      General Information       Row Name 08/10/23 [facility]          OT Time and Intention    Document Type evaluation  -SD     Mode of Treatment occupational therapy  -SD       Row Name 08/10/23 1350          General Information    Patient Profile Reviewed yes  -SD     Prior Level of Function independent:;all household mobility;ADL's  Previously was ind using a rollator, has a SC and BSC. Lives in basement of her daughter's home, has a kitchen downstairs, doesn't have to go upstairs.  -SD     Existing Precautions/Restrictions fall;oxygen therapy device and L/min;NPO;other (see comments)  on 3L O2, NG tube in place, urinary catheter in place  -SD     Barriers to Rehab medically complex;previous functional deficit;cognitive status  -SD       Row Name 08/10/23 1350          Living Environment    People in Home child(ashely), adult  -SD       Row Name 08/10/23 1350          Stairs Within Home, Primary    Stairs, Within Home, Primary lives in basement of her daughter's home  -SD       Row Name 08/10/23 1350          Cognition    Orientation Status (Cognition) unable/difficult to assess;other (see comments)  confused, non-sensical speech  -SD       Row Name 08/10/23 1352          Safety Issues, Functional Mobility    Safety Issues Affecting Function (Mobility) ability to follow commands;awareness of need for assistance;friction/shear risk;insight into deficits/self-awareness;judgment;safety  "precaution awareness;safety precautions follow-through/compliance;sequencing abilities  -SD     Impairments Affecting Function (Mobility) balance;cognition;coordination;endurance/activity tolerance;motor control;motor planning;pain;postural/trunk control;shortness of breath;strength  -SD               User Key  (r) = Recorded By, (t) = Taken By, (c) = Cosigned By      Initials Name Provider Type    SD Aimee Allan OT Occupational Therapist                     Mobility/ADL's       Row Name 08/10/23 [facility]8          Bed Mobility    Bed Mobility supine-sit;sit-supine  -SD     All Activities, [place] (Bed Mobility) dependent (less than 25% patient effort);2 person assist  -SD     Supine-Sit [place] (Bed Mobility) dependent (less than 25% patient effort);2 person assist  -SD     Sit-Supine [place] (Bed Mobility) dependent (less than 25% patient effort);2 person assist  -SD     Bed Mobility, Safety Issues decreased use of arms for pushing/pulling;cognitive deficits limit understanding;decreased use of legs for bridging/pushing;impaired trunk control for bed mobility  -SD     Assistive Device (Bed Mobility) bed rails;draw sheet;head of bed elevated  -SD     Comment, (Bed Mobility) EOB x 1 min, agitated and fearful. Repeatedly states \"let me go.\"  -SD       Row Name 08/10/23 1358          Transfers    Transfers sit-stand transfer  -SD       Row Name 08/10/23 1358          Bed-Chair Transfer    Bed-Chair [place] (Transfers) unable to assess  -SD       Row Name 08/10/23 1358          Sit-Stand Transfer    Sit-Stand [place] (Transfers) unable to assess  -SD       Row Name 08/10/23 1358          Functional Mobility    Functional Mobility- Ind. Level not appropriate to assess  -SD       Row Name 08/10/23 1358          Activities of Daily Living    BADL Assessment/Intervention bathing;upper body dressing;lower body dressing;grooming;feeding;toileting  -SD       Row Name 08/10/23 1358          Bathing " Assessment/Intervention    [place] Level (Bathing) dependent (less than 25% patient effort)  -SD       [facility] Name 08/10/23 1358          Upper Body Dressing Assessment/Training    [place] Level (Upper Body Dressing) dependent (less than 25% patient effort)  -SD       Row Name 08/10/23 1358          Lower Body Dressing Assessment/Training    [place] Level (Lower Body Dressing) dependent (less than 25% patient effort)  -SD       Row Name 08/10/23 1358          Grooming Assessment/Training    [place] Level (Grooming) dependent (less than 25% patient effort)  -SD       Row Name 08/10/23 1358          Self-Feeding Assessment/Training    [place] Level (Feeding) dependent (less than 25% patient effort);other (see comments)  NPO, NG in place  -SD       Row Name 08/10/23 1358          Toileting Assessment/Training    [place] Level (Toileting) dependent (less than 25% patient effort)  -SD               User Key  (r) = Recorded By, (t) = Taken By, (c) = Cosigned By      Initials Name Provider Type    Aimee Bolanos OT Occupational Therapist                   Obj/Interventions       Row Name 08/10/23 1401          Range of Motion Comprehensive    General Range of Motion bilateral upper extremity ROM WFL  -SD       Row Name 08/10/23 1401          Strength Comprehensive (MMT)    General Manual Muscle Testing (MMT) Assessment upper extremity strength deficits identified  -SD               User Key  (r) = Recorded By, (t) = Taken By, (c) = Cosigned By      Initials Name Provider Type    Aimee Bolanos OT Occupational Therapist                   Goals/Plan       Row Name 08/10/23 1411          Transfer Goal 1 (OT)    Activity/Assistive Device (Transfer Goal 1, OT) sit-to-stand/stand-to-sit;walker, rolling  -SD     [place] Level/Cues Needed (Transfer Goal 1, OT) minimum assist (75% or more patient effort)  -SD     Time Frame (Transfer Goal 1, OT) long term goal (LTG)  -SD      Progress/Outcome (Transfer Goal 1, OT) goal ongoing  -SD       Row Name 08/10/23 1411          Dressing Goal 1 (OT)    Activity/Device (Dressing Goal 1, OT) upper body dressing  -SD     [place]/Cues Needed (Dressing Goal 1, OT) minimum assist (75% or more patient effort)  -SD     Time Frame (Dressing Goal 1, OT) 2 weeks  -SD     Progress/Outcome (Dressing Goal 1, OT) goal ongoing  -SD       Row Name 08/10/23 1411          Toileting Goal 1 (OT)    Activity/Device (Toileting Goal 1, OT) toileting skills, all;commode, bedside without drop arms  -SD     [place] Level/Cues Needed (Toileting Goal 1, OT) minimum assist (75% or more patient effort)  -SD     Time Frame (Toileting Goal 1, OT) 2 weeks  -SD     Progress/Outcome (Toileting Goal 1, OT) goal ongoing  -SD       Row Name 08/10/23 1411          Strength Goal 1 (OT)    Strength Goal 1 (OT) Patient to perform UB ther ex as tolerated  -SD     Time Frame (Strength Goal 1, OT) long term goal (LTG)  -SD     Progress/Outcome (Strength Goal 1, OT) goal ongoing  -SD       Row Name 08/10/23 1411          Therapy Assessment/Plan (OT)    Planned Therapy Interventions (OT) activity tolerance training;adaptive equipment training;BADL retraining;patient/caregiver education/training;ROM/therapeutic exercise;strengthening exercise;transfer/mobility retraining  -SD               User Key  (r) = Recorded By, (t) = Taken By, (c) = Cosigned By      Initials Name Provider Type    Aimee Bolanos OT Occupational Therapist                   Clinical Impression       Row Name 08/10/23 1401          Pain Assessment    Additional Documentation Pain Scale: FACES Pre/Post-Treatment (Group)  -SD       Row Name 08/10/23 1401          Pain Scale: FACES Pre/Post-Treatment    Pain: FACES Scale, Pretreatment 4-->hurts little more  -SD     Posttreatment Pain Rating 4-->hurts little more  -SD     Pain Location - abdomen  -SD       Row Name 08/10/23 1401          Plan of Care Review     "Plan of Care Reviewed With patient  -SD     Progress no change  -SD     Outcome Evaluation OT eval completed. Patient supine in bed, sleeping soundly; is on O2, NG tube and urinary catheter intact. Patient awakens to name and is able to state her name, further orientation unable to obtain. Patient is confused, with speech being non-sensical and decreased ability to follow commands, mostly keeps eyes closed. Patient moved to EOB with TD x 2, is agitated and fearful sitting EOB and repeatedly says \"let me go, let me go\". Patient is too drowsy and confused to advance mobility further this date. Patient returned to supine and placed in R sidelying with pillows placed for pressure reduction. Notified RN of patient's performance. Patient to continue with OT services prior to DC, advancing mobility/participation with functional tasks as appropriate in order to return to PLOF.  -SD       Row Name 08/10/23 1401          Therapy Assessment/Plan (OT)    Patient/Family Therapy Goal Statement (OT) advance mobility  -SD     Rehab Potential (OT) fair, will monitor progress closely  -SD     Criteria for Skilled Therapeutic Interventions Met (OT) skilled treatment is necessary  -SD     Therapy Frequency (OT) 3 times/wk  5 times if indicated  -SD       Row Name 08/10/23 1401          Therapy Plan Review/Discharge Plan (OT)    Anticipated Discharge Disposition (OT) inpatient rehabilitation facility  -SD       Row Name 08/10/23 1401          Vital Signs    O2 Delivery Pre Treatment supplemental O2  -SD     O2 Delivery Intra Treatment supplemental O2  -SD     O2 Delivery Post Treatment supplemental O2  -SD       Row Name 08/10/23 1401          Positioning and Restraints    Pre-Treatment Position in bed  -SD     Post Treatment Position bed  -SD     In Bed side lying right;call light within reach;encouraged to call for assist;patient within staff view;notified nsg  -SD               User Key  (r) = Recorded By, (t) = Taken By, (c) = " Cosigned By      Initials Name Provider Type    Aimee Bolanos OT Occupational Therapist                   Outcome Measures       Row Name 08/10/23 1411          How much help from another is currently needed...    Putting on and taking off regular lower body clothing? 1  -SD     Bathing (including washing, rinsing, and drying) 1  -SD     Toileting (which includes using toilet bed pan or urinal) 1  -SD     Putting on and taking off regular upper body clothing 1  -SD     Taking care of personal grooming (such as brushing teeth) 1  -SD     Eating meals 1  -SD     AM-PAC 6 Clicks Score (OT) 6  -SD       Row Name 08/10/23 1411          Functional Assessment    Outcome Measure Options AM-PAC 6 Clicks Daily Activity (OT)  -SD               User Key  (r) = Recorded By, (t) = Taken By, (c) = Cosigned By      Initials Name Provider Type    Aimee Bolanos OT Occupational Therapist                    Occupational Therapy Education       Title: PT OT SLP Therapies (In Progress)       Topic: Occupational Therapy (In Progress)       Point: ADL training (In Progress)       Description:   Instruct learner(s) on proper safety adaptation and remediation techniques during self care or transfers.   Instruct in proper use of assistive devices.                  Learning Progress Summary             Patient Nonacceptance, E, NL,NR by SD at 8/10/2023 1412    Comment: OT POC                         Point: Home exercise program (Not Started)       Description:   Instruct learner(s) on appropriate technique for monitoring, assisting and/or progressing therapeutic exercises/activities.                  Learner Progress:  Not documented in this visit.              Point: Precautions (Not Started)       Description:   Instruct learner(s) on prescribed precautions during self-care and functional transfers.                  Learner Progress:  Not documented in this visit.              Point: Body mechanics (Not Started)        "Description:   Instruct learner(s) on proper positioning and spine alignment during self-care, functional mobility activities and/or exercises.                  Learner Progress:  Not documented in this visit.                              User Key       Initials Effective Dates Name Provider Type Discipline    SD 06/16/21 -  Aimee Allan OT Occupational Therapist OT                  OT Recommendation and Plan  Planned Therapy Interventions (OT): activity tolerance training, adaptive equipment training, BADL retraining, patient/caregiver education/training, ROM/therapeutic exercise, strengthening exercise, transfer/mobility retraining  Therapy Frequency (OT): 3 times/wk (5 times if indicated)  Plan of Care Review  Plan of Care Reviewed With: patient  Progress: no change  Outcome Evaluation: OT eval completed. Patient supine in bed, sleeping soundly; is on O2, NG tube and urinary catheter intact. Patient awakens to name and is able to state her name, further orientation unable to obtain. Patient is confused, with speech being non-sensical and decreased ability to follow commands, mostly keeps eyes closed. Patient moved to EOB with TD x 2, is agitated and fearful sitting EOB and repeatedly says \"let me go, let me go\". Patient is too drowsy and confused to advance mobility further this date. Patient returned to supine and placed in R sidelying with pillows placed for pressure reduction. Notified RN of patient's performance. Patient to continue with OT services prior to DC, advancing mobility/participation with functional tasks as appropriate in order to return to PLOF.     Time Calculation:   Evaluation Complexity (OT)  Review Occupational Profile/Medical/Therapy History Complexity: expanded/moderate complexity  Assessment, Occupational Performance/Identification of Deficit Complexity: 3-5 performance deficits  Clinical Decision Making Complexity (OT): detailed assessment/moderate complexity  Overall Complexity of " Evaluation (OT): moderate complexity     Time Calculation- OT       Row Name 08/10/23 1412             Time Calculation- OT    OT Start Time 1110  -SD      OT Received On 08/10/23  -SD      OT Goal Re-Cert Due Date 08/23/23  -SD         Untimed Charges    OT Eval/Re-eval Minutes 45  -SD         Total Minutes    Untimed Charges Total Minutes 45  -SD       Total Minutes 45  -SD                User Key  (r) = Recorded By, (t) = Taken By, (c) = Cosigned By      Initials Name Provider Type    Aimee Bolanos OT Occupational Therapist                  Therapy Charges for Today       Code Description Service Date Service Provider Modifiers Qty    09322081549 HC OT EVAL MOD COMPLEXITY 3 8/10/2023 Aimee Allan OT GO 1                 Aimee Allan OT  8/10/2023

## 2023-08-10 NOTE — PROGRESS NOTES
Adult Nutrition  Assessment/PES    Patient Name:  Gabi Dudley  YOB: 1945  MRN: [id number]  Admit Date:  8/7/2023    Assessment Date:  8/10/2023    Comments:       Pt NPO day 3 s/p exploratory laparotomy w/ partial small bowel obstruction. Will continue to monitor and follow-up     Reason for Assessment       Row Name 08/10/23 1522          Reason for Assessment    Reason For Assessment identified at risk by screening criteria;per organizational policy;follow-up protocol     Diagnosis gastrointestinal disease     Identified At Risk by Screening Criteria difficulty chewing/swallowing;MST SCORE 2+                      Labs/Tests/Procedures/Meds       Row Name 08/10/23 1522          Labs/Procedures/Meds    Lab Results Reviewed reviewed, pertinent     Lab Results Comments High: Na, BUN        Medications    Pertinent Medications Reviewed reviewed, pertinent     Pertinent Medications Comments lovenox, docusate                    Physical Findings       Row Name 08/10/23 1523          Physical Findings    Overall Physical Appearance normal wt for age, midline abdomenal incision, NG-tube                      Nutrition Prescription Ordered       Row Name 08/10/23 1528          Nutrition Prescription PO    Current PO Diet NPO                    Evaluation of Received Nutrient/Fluid Intake       Row Name 08/10/23 1529          Intake Assessment    Energy/Calorie Requirement Assessment not meeting needs     Protein Requirement Assessment not meeting needs                       Problem/Interventions:   Problem 1       Row Name 08/10/23 1529          Nutrition Diagnoses Problem 1    Problem 1 Altered GI Function     Etiology (related to) Medical Diagnosis     Gastrointestinal Constipation;SBO     Signs/Symptoms (evidenced by) NPO                          Intervention Goal       Row Name 08/10/23 1529          Intervention Goal    General Maintain nutrition;Improved nutrition related lab(s);Reduce/improve  symptoms;Meet nutritional needs for age/condition;Disease management/therapy     PO Establish PO;Advance diet     Weight Maintain weight                    Nutrition Intervention       Row Name 08/10/23 1529          Nutrition Intervention    RD/Tech Action Follow Tx progress;Care plan reviewd;Await begin PO                    Nutrition Prescription       Row Name 08/10/23 1529          Nutrition Prescription PO    New PO Prescription Ordered? No, recommended        Other Orders    Obtain Weight Daily     Obtain Weight Ordered? No, recommended     Supplement Vitamin mineral supplement     Supplement Ordered? No, recommended     Labs Mg++;Na+;Phos;K+                    Education/Evaluation       Row Name 08/10/23 1529          Education    Education Will Instruct as appropriate        Monitor/Evaluation    Monitor Per protocol;PO intake;Weight;Skin status;Pertinent labs;Symptoms                     Electronically signed by:  Sergey Arredondo RD  08/10/23 15:38 EDT

## 2023-08-10 NOTE — PLAN OF CARE
Goal Outcome Evaluation:  Plan of Care Reviewed With: patient, family        Progress: improving  Outcome Evaluation: Amiodarone gtt continued per cardiology, pt converted to NSR in AM. NG tube remained intact and hooked up to suction throughout shift, with moderate output throughout shift. Pt remains confused but easily reoriented. PRN analgesia and antihypertensives given during shift. urine output minimal, martinez catheter d/c'd.

## 2023-08-10 NOTE — PLAN OF CARE
Goal Outcome Evaluation:  Plan of Care Reviewed With: (P) patient           Outcome Evaluation: (P) PT evaluation completed today. Upon entry she was supine in bed, A/O to her name, and on supplemental O2. She had fearful and incoherant speech throughout duration of evaluation. Patient was dependent with all mobility. She was brought to EOB supported dependently x1 for 5 minutes before being layed back down due to patient requesting it. She was returned to comfortable position in bed. Patient is expected to benefit from skilled PT prior to D/C in order to improve activity tolerance.      Anticipated Discharge Disposition (PT): (P) inpatient rehabilitation facility, skilled nursing facility

## 2023-08-10 NOTE — THERAPY EVALUATION
Patient Name: Gabi Dudley  : 1945    MRN: [id number]                              Today's Date: 8/10/2023       Admit Date: 2023    Visit Dx:     ICD-10-CM ICD-9-CM   1. Partial small bowel obstruction  K56.600 560.9   2. Atrial fibrillation with rapid ventricular response  I48.91 427.31     Patient Active Problem List   Diagnosis    Adrenal adenoma    Anxiety    Chronic fatigue    Fibromyalgia    Hyperlipidemia    Essential hypertension    Insomnia    Osteoarthritis of knee    Osteoporosis    Pulmonary emphysema    Simple renal cyst    Tension headache    Vitamin D deficiency    Diverticulosis    Inversion of nipple    Paroxysmal atrial fibrillation    Coronary artery disease involving native coronary artery of native heart without angina pectoris    Autonomic dysfunction    Gastroesophageal reflux disease without esophagitis    Urge incontinence    Erythrasma    Compression fracture of T5 vertebra    Lung nodule    COPD exacerbation    Overweight (BMI 25.0-29.9)    Acute on chronic respiratory failure with hypoxia    Acute on chronic respiratory failure with hypoxia and hypercapnia    Iron deficiency anemia    Impaired mobility and ADLs    Acute respiratory failure with hypoxia and hypercapnia    Aspiration pneumonia of right lower lobe    Partial small bowel obstruction     Past Medical History:   Diagnosis Date    Adrenal adenoma     Anemia     Arrhythmia     Asthma     Atrial fibrillation     Back pain     Benign colonic polyp     Benign tumor of adrenal gland     Cataract     bilateral    Cholelithiasis     Chronic bronchitis     Chronic bronchitis with COPD (chronic obstructive pulmonary disease)     COPD (chronic obstructive pulmonary disease)     Coronary artery disease     Depression     Diverticulosis Years ago    Elevated cholesterol     Environmental and seasonal allergies     Fibromyalgia     Fibromyalgia, primary Sometime in the s    Gastritis     Generalized anxiety disorder      GERD (gastroesophageal reflux disease)     H/O mammogram     Headache Years ago    Hemorrhoids     History of blood transfusion 1985    History of blood transfusion 1985    History of echocardiogram     History of endometriosis     History of nuclear stress test     Hospitalization or health care facility admission within last 6 months     5 times between 11/2022-05/2023    Hypertension     IBS (irritable bowel syndrome)     Impaired functional mobility, balance, gait, and endurance     Impaired mobility     Inverted nipple     Kidney disease     Kidney stone     Liver cyst     Low back pain Years ago    Nodular radiologic density     Nodule of left lung     On home oxygen therapy     2 liters NC QHS    Osteoarthritis     Osteopenia Several years ago    Osteoporosis     PONV (postoperative nausea and vomiting)     Renal cyst     Sinus problem     2014    Sinusitis     Skin cancer     basal cell carcinoma    SOB (shortness of breath)     Tobacco use     Urinary frequency     Urinary tract infection Years ago    Frequent UTIs    Vitamin D deficiency     Wears glasses     Wears partial dentures     upper plate     Past Surgical History:   Procedure Laterality Date    APPENDECTOMY  1980s    CARDIAC CATHETERIZATION  2003    CATARACT EXTRACTION  2013    both eyes    CHOLECYSTECTOMY  2020    CHOLECYSTECTOMY WITH INTRAOPERATIVE CHOLANGIOGRAM N/A 10/30/2020    Procedure: CHOLECYSTECTOMY LAPAROSCOPIC INTRAOPERATIVE CHOLANGIOGRAPHY;  Surgeon: Sima Moses MD;  Location: [facility] OR;  Service: General;  Laterality: N/A;    COLON SURGERY  2020    COLONOSCOPY  03/11/2013    COLONOSCOPY  06/21/2016    COLONOSCOPY N/A 07/24/2019    Procedure: COLONOSCOPY W/ COLD FORCEP POLYPECTOMIES; HOT SNARE POLYPECTOMIES; COLD SNARE POLYPECTOMY;  Surgeon: Goyo Nunez MD;  Location: [facility] ENDOSCOPY;  Service: Gastroenterology    COLONOSCOPY W/ BIOPSIES AND POLYPECTOMY      EXPLORATORY LAPAROTOMY N/A 11/23/2020    Procedure: colectomy,  right, closure of enterotomy x 2, reduction of internal volvulus;  Surgeon: Sima Moses MD;  Location:  [facility] OR;  Service: General;  Laterality: N/A;    EXPLORATORY LAPAROTOMY N/A 8/9/2023    Procedure: LAPAROTOMY EXPLORATORY WITH LYIS OF ADHESIONS AND  CENTRAL LINE INSERTION;  Surgeon: Bianca Isaac DO;  Location:  [facility] OR;  Service: General;  Laterality: N/A;    HYSTERECTOMY  1980s    partial    LYSIS OF ABDOMINAL ADHESIONS      TONSILLECTOMY  1987    UPPER GASTROINTESTINAL ENDOSCOPY  01/13/2015    VAGINAL DELIVERY      x2      General Information       Row Name 08/10/23 1106          Physical Therapy Time and Intention    Document Type evaluation (P)   -NL     Mode of Treatment physical therapy (P)   -NL       Row Name 08/10/23 1106          General Information    Patient Profile Reviewed yes (P)   -NL     Prior Level of Function independent:;gait;min assist:;ADL's (P)   -NL     Existing Precautions/Restrictions fall;oxygen therapy device and L/min (P)   -NL     Barriers to Rehab previous functional deficit;medically complex;cognitive status (P)   -NL       Row Name 08/10/23 1106          Living Environment    People in Home child(ashely), adult (P)   -NL       Row Name 08/10/23 1106          Home Main Entrance    Number of Stairs, Main Entrance none (P)   -NL       Row Name 08/10/23 1106          Stairs Within Home, Primary    Number of Stairs, Within Home, Primary other (see comments) (P)   16 but she does not go up them.  -NL       Row Name 08/10/23 1106          Cognition    Orientation Status (Cognition) oriented to;person (P)   -NL       Row Name 08/10/23 1106          Safety Issues, Functional Mobility    Safety Issues Affecting Function (Mobility) ability to follow commands;awareness of need for assistance;insight into deficits/self-awareness;safety precaution awareness;safety precautions follow-through/compliance (P)   -NL     Impairments Affecting Function (Mobility)  cognition;strength;pain;endurance/activity tolerance;balance (P)   -NL     Cognitive Impairments, Mobility Safety/Performance attention;insight into deficits/self-awareness;safety precaution awareness;safety precaution follow-through (P)   -NL               User Key  (r) = Recorded By, (t) = Taken By, (c) = Cosigned By      Initials Name Provider Type    Carlo Ceballos, PT Student PT Student                   Mobility       Row Name 08/10/23 1106          Bed Mobility    Bed Mobility bed mobility (all) activities (P)   -NL     All Activities, [place] (Bed Mobility) dependent (less than 25% patient effort);2 person assist (P)   -NL     Assistive Device (Bed Mobility) head of bed elevated;draw sheet (P)   -NL       [facility] Name 08/10/23 1106          Bed-Chair Transfer    Bed-Chair [place] (Transfers) not tested (P)   -NL       Row Name 08/10/23 1106          Sit-Stand Transfer    Sit-Stand [place] (Transfers) not tested (P)   -NL       Row Name 08/10/23 1106          Gait/Stairs (Locomotion)    [place] Level (Gait) not tested (P)   -NL               User Key  (r) = Recorded By, (t) = Taken By, (c) = Cosigned By      Initials Name Provider Type    Carlo Ceballos, PT Student PT Student                   Obj/Interventions       Row Name 08/10/23 1106          Range of Motion Comprehensive    General Range of Motion lower extremity range of motion deficits identified (P)   -NL       Row Name 08/10/23 1106          Strength Comprehensive (MMT)    General Manual Muscle Testing (MMT) Assessment lower extremity strength deficits identified (P)   -NL     Comment, General Manual Muscle Testing (MMT) Assessment BLE gross strength 1/5 (P)   -NL       Row Name 08/10/23 1106          Balance    Comment, Balance Unable to assess (P)   -NL       Row Name 08/10/23 1106          Sensory Assessment (Somatosensory)    Sensory Assessment (Somatosensory) not tested (P)   -NL               User Key  (r) = Recorded By,  (t) = Taken By, (c) = Cosigned By      Initials Name Provider Type    NL Carlo Gruber, PT Student PT Student                   Goals/Plan       Row Name 08/10/23 1106          Bed Mobility Goal 1 (PT)    Activity/Assistive Device (Bed Mobility Goal 1, PT) rolling to left;rolling to right;supine to sit (P)   -NL     [place] Level/Cues Needed (Bed Mobility Goal 1, PT) moderate assist (50-74% patient effort) (P)   -NL     Time Frame (Bed Mobility Goal 1, PT) short term goal (STG);5 days (P)   -NL     Progress/Outcomes (Bed Mobility Goal 1, PT) goal ongoing (P)   -NL       Row Name 08/10/23 1106          Transfer Goal 1 (PT)    Activity/Assistive Device (Transfer Goal 1, PT) sit-to-stand/stand-to-sit;walker, rolling (P)   -NL     [place] Level/Cues Needed (Transfer Goal 1, PT) maximum assist (25-49% patient effort) (P)   -NL     Time Frame (Transfer Goal 1, PT) long term goal (LTG);10 days (P)   -NL     Progress/Outcome (Transfer Goal 1, PT) goal ongoing (P)   -NL       Row Name 08/10/23 1106          Patient Education Goal (PT)    Activity (Patient Education Goal, PT) Patient will demonstrate HEP with independence. (P)   -NL     [place]/Cues/Accuracy (Memory Goal 2, PT) demonstrates adequately;independent;verbalizes understanding (P)   -NL     Time Frame (Patient Education Goal, PT) long term goal (LTG);1 week (P)   -NL     Progress/Outcome (Patient Education Goal, PT) goal ongoing (P)   -NL               User Key  (r) = Recorded By, (t) = Taken By, (c) = Cosigned By      Initials Name Provider Type    Carlo Ceballos, PT Student PT Student                   Clinical Impression       Row Name 08/10/23 1106          Pain    Additional Documentation Pain Scale: FACES Pre/Post-Treatment (Group) (P)   -NL       Row Name 08/10/23 1106          Pain Scale: FACES Pre/Post-Treatment    Pain: FACES Scale, Pretreatment 4-->hurts little more (P)   -NL     Posttreatment Pain Rating 4-->hurts little more (P)   -NL        Row Name 08/10/23 1106          Plan of Care Review    Plan of Care Reviewed With patient (P)   -NL     Outcome Evaluation PT evaluation completed today. Upon entry she was supine in bed, A/O to her name, and on supplemental O2. She had fearful and incoherant speech throughout duration of evaluation. Patient was dependent with all mobility. She was brought to EOB supported dependently x1 for 5 minutes before being layed back down due to patient requesting it. She was returned to comfortable position in bed. Patient is expected to benefit from skilled PT prior to D/C in order to improve activity tolerance. (P)   -NL       Row Name 08/10/23 1106          Therapy Assessment/Plan (PT)    Patient/Family Therapy Goals Statement (PT) To return home. (P)   -NL     Rehab Potential (PT) good, to achieve stated therapy goals (P)   -NL     Criteria for Skilled Interventions Met (PT) yes;meets criteria;skilled treatment is necessary (P)   -NL     Therapy Frequency (PT) 5 times/wk (P)   -NL       Row Name 08/10/23 1106          Vital Signs    O2 Delivery Pre Treatment supplemental O2 (P)   -NL     O2 Delivery Intra Treatment supplemental O2 (P)   -NL     O2 Delivery Post Treatment supplemental O2 (P)   -NL     Pre Patient Position Supine (P)   -NL     Intra Patient Position Supine (P)   -NL     Post Patient Position Supine (P)   -NL       Row Name 08/10/23 1106          Positioning and Restraints    Pre-Treatment Position in bed (P)   -NL     Post Treatment Position bed (P)   -NL     In Bed supine;call light within reach;encouraged to call for assist;patient within staff view (P)   -NL               User Key  (r) = Recorded By, (t) = Taken By, (c) = Cosigned By      Initials Name Provider Type    Carlo Ceballos, PT Student PT Student                   Outcome Measures       Row Name 08/10/23 1106          How much help from another person do you currently need...    Turning from your back to your side while in flat bed  without using bedrails? 1 (P)   -NL     Moving from lying on back to sitting on the side of a flat bed without bedrails? 1 (P)   -NL     Moving to and from a bed to a chair (including a wheelchair)? 1 (P)   -NL     Standing up from a chair using your arms (e.g., wheelchair, bedside chair)? 1 (P)   -NL     Climbing 3-5 steps with a railing? 1 (P)   -NL     To walk in hospital room? 1 (P)   -NL     AM-PAC 6 Clicks Score (PT) 6 (P)   -NL     Highest level of mobility 2 --> Bed activities/dependent transfer (P)   -NL       Row Name 08/10/23 1411 08/10/23 1106       Functional Assessment    Outcome Measure Options AM-PAC 6 Clicks Daily Activity (OT)  -SD AM-PAC 6 Clicks Basic Mobility (PT) (P)   -NL              User Key  (r) = Recorded By, (t) = Taken By, (c) = Cosigned By      Initials Name Provider Type    SD Aimee Allan, OT Occupational Therapist    Carlo Ceballos, PT Student PT Student                                 Physical Therapy Education       Title: PT OT SLP Therapies (In Progress)       Topic: Physical Therapy (In Progress)       Point: Mobility training (In Progress)       Learning Progress Summary             Patient Acceptance, E, NR,NL by NL at 8/10/2023 1106    Comment: Patient motivated to participate in movement due to its benefits.                         Point: Home exercise program (Not Started)       Learner Progress:  Not documented in this visit.              Point: Body mechanics (Not Started)       Learner Progress:  Not documented in this visit.              Point: Precautions (Not Started)       Learner Progress:  Not documented in this visit.                              User Key       Initials Effective Dates Name Provider Type Discipline    NL 07/13/23 -  Carlo Gruber, PT Student PT Student PT                  PT Recommendation and Plan     Plan of Care Reviewed With: (P) patient  Outcome Evaluation: (P) PT evaluation completed today. Upon entry she was supine in bed, A/O to her  name, and on supplemental O2. She had fearful and incoherant speech throughout duration of evaluation. Patient was dependent with all mobility. She was brought to EOB supported dependently x1 for 5 minutes before being layed back down due to patient requesting it. She was returned to comfortable position in bed. Patient is expected to benefit from skilled PT prior to D/C in order to improve activity tolerance.     Time Calculation:   PT Evaluation Complexity  History, PT Evaluation Complexity: (P) 3 or more personal factors and/or comorbidities  Examination of Body Systems (PT Eval Complexity): (P) total of 4 or more elements  Clinical Presentation (PT Evaluation Complexity): (P) evolving  Clinical Decision Making (PT Evaluation Complexity): (P) moderate complexity  Overall Complexity (PT Evaluation Complexity): (P) moderate complexity     PT Charges       Row Name 08/10/23 1106             Time Calculation    Start Time 1106 (P)   -NL      PT Received On 08/10/23 (P)   -NL      PT Goal Re-Cert Due Date 08/20/23 (P)   -NL         Untimed Charges    PT Eval/Re-eval Minutes 40 (P)   -NL         Total Minutes    Untimed Charges Total Minutes 40 (P)   -NL       Total Minutes 40 (P)   -NL                User Key  (r) = Recorded By, (t) = Taken By, (c) = Cosigned By      Initials Name Provider Type    NL Carlo Gruber, PT Student PT Student                  Therapy Charges for Today       Code Description Service Date Service Provider Modifiers Qty    [id number]  PT EVAL MOD COMPLEXITY 3 8/10/2023 Carlo Gruber, PT Student GP 1            PT G-Codes  Outcome Measure Options: AM-PAC 6 Clicks Daily Activity (OT)  AM-PAC 6 Clicks Score (PT): (P) 6  AM-PAC 6 Clicks Score (OT): 6  PT Discharge Summary  Anticipated Discharge Disposition (PT): (P) inpatient rehabilitation facility, skilled nursing facility    Carlo Gruber PT Student  8/10/2023

## 2023-08-10 NOTE — PLAN OF CARE
Goal Outcome Evaluation:  Plan of Care Reviewed With: patient           Outcome Evaluation: Pt required 50% venti mask through the night due to mouth breathing.  Amiodarone drip ongoing.  Pt in and out of atrial fib 140's.

## 2023-08-11 ENCOUNTER — APPOINTMENT (OUTPATIENT)
Dept: CT IMAGING | Facility: HOSPITAL | Age: 78
DRG: 329 | End: 2023-08-11
Payer: MEDICARE

## 2023-08-11 ENCOUNTER — APPOINTMENT (OUTPATIENT)
Dept: MRI IMAGING | Facility: HOSPITAL | Age: 78
DRG: 329 | End: 2023-08-11
Payer: MEDICARE

## 2023-08-11 LAB
A-A DO2: 16.2 MMHG
A-A DO2: 79.5 MMHG
A-A DO2: 89.3 MMHG
ALBUMIN SERPL-MCNC: 3.4 G/DL (ref 3.5–5.2)
ALBUMIN/GLOB SERPL: 1.5 G/DL
ALP SERPL-CCNC: 53 U/L (ref 39–117)
ALT SERPL W P-5'-P-CCNC: 24 U/L (ref 1–33)
AMMONIA BLD-SCNC: 60 UMOL/L (ref 11–51)
ANION GAP SERPL CALCULATED.3IONS-SCNC: 4.1 MMOL/L (ref 5–15)
ANION GAP SERPL CALCULATED.3IONS-SCNC: 4.7 MMOL/L (ref 5–15)
ARTERIAL PATENCY WRIST A: ABNORMAL
AST SERPL-CCNC: 28 U/L (ref 1–32)
ATMOSPHERIC PRESS: 730 MMHG
ATMOSPHERIC PRESS: 731 MMHG
ATMOSPHERIC PRESS: 731 MMHG
BASE EXCESS BLDA CALC-SCNC: 12.2 MMOL/L (ref 0–2)
BASE EXCESS BLDA CALC-SCNC: 12.7 MMOL/L (ref 0–2)
BASE EXCESS BLDA CALC-SCNC: 13.9 MMOL/L (ref 0–2)
BDY SITE: ABNORMAL
BILIRUB SERPL-MCNC: 0.2 MG/DL (ref 0–1.2)
BUN SERPL-MCNC: 37 MG/DL (ref 8–23)
BUN SERPL-MCNC: 38 MG/DL (ref 8–23)
BUN/CREAT SERPL: 57.8 (ref 7–25)
BUN/CREAT SERPL: 79.2 (ref 7–25)
CALCIUM SPEC-SCNC: 7.3 MG/DL (ref 8.6–10.5)
CALCIUM SPEC-SCNC: 7.4 MG/DL (ref 8.6–10.5)
CHLORIDE SERPL-SCNC: 108 MMOL/L (ref 98–107)
CHLORIDE SERPL-SCNC: 109 MMOL/L (ref 98–107)
CO2 SERPL-SCNC: 37.9 MMOL/L (ref 22–29)
CO2 SERPL-SCNC: 38.3 MMOL/L (ref 22–29)
COHGB MFR BLD: 1 % (ref 0–2)
COHGB MFR BLD: 1.7 % (ref 0–2)
COHGB MFR BLD: 1.8 % (ref 0–2)
CREAT SERPL-MCNC: 0.48 MG/DL (ref 0.57–1)
CREAT SERPL-MCNC: 0.64 MG/DL (ref 0.57–1)
CREAT UR-MCNC: 105.2 MG/DL
DEPRECATED RDW RBC AUTO: 61.9 FL (ref 37–54)
EGFRCR SERPLBLD CKD-EPI 2021: 91.7 ML/MIN/1.73
EGFRCR SERPLBLD CKD-EPI 2021: 98.3 ML/MIN/1.73
ERYTHROCYTE [DISTWIDTH] IN BLOOD BY AUTOMATED COUNT: 19.5 % (ref 12.3–15.4)
GAS FLOW AIRWAY: 4 LPM
GLOBULIN UR ELPH-MCNC: 2.3 GM/DL
GLUCOSE BLDC GLUCOMTR-MCNC: 161 MG/DL (ref 70–130)
GLUCOSE SERPL-MCNC: 139 MG/DL (ref 65–99)
GLUCOSE SERPL-MCNC: 156 MG/DL (ref 65–99)
HCO3 BLDA-SCNC: 39.2 MMOL/L (ref 22–28)
HCO3 BLDA-SCNC: 41.4 MMOL/L (ref 22–28)
HCO3 BLDA-SCNC: 41.6 MMOL/L (ref 22–28)
HCT VFR BLD AUTO: 34.9 % (ref 34–46.6)
HCT VFR BLD CALC: 22.9 %
HCT VFR BLD CALC: 25.2 %
HCT VFR BLD CALC: 25.9 %
HGB BLD-MCNC: 9.3 G/DL (ref 12–15.9)
INHALED O2 CONCENTRATION: 35 %
INHALED O2 CONCENTRATION: 36 %
INHALED O2 CONCENTRATION: 40 %
Lab: ABNORMAL
MCH RBC QN AUTO: 23.2 PG (ref 26.6–33)
MCHC RBC AUTO-ENTMCNC: 26.6 G/DL (ref 31.5–35.7)
MCV RBC AUTO: 87 FL (ref 79–97)
METHGB BLD QL: 0.8 % (ref 0–1.5)
METHGB BLD QL: 1 % (ref 0–1.5)
METHGB BLD QL: 1 % (ref 0–1.5)
MODALITY: ABNORMAL
NOTE: ABNORMAL
NOTIFIED BY: ABNORMAL
NOTIFIED WHO: ABNORMAL
OSMOLALITY SERPL: 326 MOSM/KG (ref 280–301)
OSMOLALITY UR: 401 MOSM/KG
OXYHGB MFR BLDV: 94.6 % (ref 94–99)
OXYHGB MFR BLDV: 95.5 % (ref 94–99)
OXYHGB MFR BLDV: 96.7 % (ref 94–99)
PCO2 BLDA: 69.9 MM HG (ref 35–45)
PCO2 BLDA: 74.6 MM HG (ref 35–45)
PCO2 BLDA: 88.1 MM HG (ref 35–45)
PCO2 TEMP ADJ BLD: ABNORMAL MM[HG]
PH BLDA: 7.28 PH UNITS (ref 7.35–7.45)
PH BLDA: 7.35 PH UNITS (ref 7.35–7.45)
PH BLDA: 7.36 PH UNITS (ref 7.35–7.45)
PH, TEMP CORRECTED: ABNORMAL
PLATELET # BLD AUTO: 174 10*3/MM3 (ref 140–450)
PMV BLD AUTO: 11.6 FL (ref 6–12)
PO2 BLDA: 102 MM HG (ref 75–100)
PO2 BLDA: 133 MM HG (ref 75–100)
PO2 BLDA: 82.2 MM HG (ref 75–100)
PO2 TEMP ADJ BLD: ABNORMAL MM[HG]
POTASSIUM SERPL-SCNC: 3.6 MMOL/L (ref 3.5–5.2)
POTASSIUM SERPL-SCNC: 3.9 MMOL/L (ref 3.5–5.2)
PROT SERPL-MCNC: 5.7 G/DL (ref 6–8.5)
RBC # BLD AUTO: 4.01 10*6/MM3 (ref 3.77–5.28)
SAO2 % BLDCOA: 97.3 % (ref 94–100)
SAO2 % BLDCOA: 97.3 % (ref 94–100)
SAO2 % BLDCOA: 99.4 % (ref 94–100)
SODIUM SERPL-SCNC: 151 MMOL/L (ref 136–145)
SODIUM SERPL-SCNC: 151 MMOL/L (ref 136–145)
SODIUM UR-SCNC: <20 MMOL/L
VENTILATOR MODE: ABNORMAL
WBC NRBC COR # BLD: 8.94 10*3/MM3 (ref 3.4–10.8)

## 2023-08-11 PROCEDURE — 99232 SBSQ HOSP IP/OBS MODERATE 35: CPT | Performed by: INTERNAL MEDICINE

## 2023-08-11 PROCEDURE — 82570 ASSAY OF URINE CREATININE: CPT | Performed by: INTERNAL MEDICINE

## 2023-08-11 PROCEDURE — 94799 UNLISTED PULMONARY SVC/PX: CPT

## 2023-08-11 PROCEDURE — 94761 N-INVAS EAR/PLS OXIMETRY MLT: CPT

## 2023-08-11 PROCEDURE — 94640 AIRWAY INHALATION TREATMENT: CPT

## 2023-08-11 PROCEDURE — 25010000002 KETOROLAC TROMETHAMINE PER 15 MG: Performed by: INTERNAL MEDICINE

## 2023-08-11 PROCEDURE — 25010000002 HYDROMORPHONE 1 MG/ML SOLUTION: Performed by: INTERNAL MEDICINE

## 2023-08-11 PROCEDURE — 70450 CT HEAD/BRAIN W/O DYE: CPT

## 2023-08-11 PROCEDURE — 99223 1ST HOSP IP/OBS HIGH 75: CPT | Performed by: INTERNAL MEDICINE

## 2023-08-11 PROCEDURE — 25010000002 LORAZEPAM PER 2 MG: Performed by: INTERNAL MEDICINE

## 2023-08-11 PROCEDURE — 99222 1ST HOSP IP/OBS MODERATE 55: CPT | Performed by: PSYCHIATRY & NEUROLOGY

## 2023-08-11 PROCEDURE — 83935 ASSAY OF URINE OSMOLALITY: CPT | Performed by: INTERNAL MEDICINE

## 2023-08-11 PROCEDURE — 85027 COMPLETE CBC AUTOMATED: CPT | Performed by: INTERNAL MEDICINE

## 2023-08-11 PROCEDURE — 25010000002 ENOXAPARIN PER 10 MG: Performed by: INTERNAL MEDICINE

## 2023-08-11 PROCEDURE — 83050 HGB METHEMOGLOBIN QUAN: CPT

## 2023-08-11 PROCEDURE — 84300 ASSAY OF URINE SODIUM: CPT | Performed by: INTERNAL MEDICINE

## 2023-08-11 PROCEDURE — 0 POTASSIUM CHLORIDE PER 2 MEQ: Performed by: INTERNAL MEDICINE

## 2023-08-11 PROCEDURE — 94660 CPAP INITIATION&MGMT: CPT

## 2023-08-11 PROCEDURE — 25010000002 AMIODARONE IN DEXTROSE 5% 360-4.14 MG/200ML-% SOLUTION: Performed by: INTERNAL MEDICINE

## 2023-08-11 PROCEDURE — 99024 POSTOP FOLLOW-UP VISIT: CPT | Performed by: STUDENT IN AN ORGANIZED HEALTH CARE EDUCATION/TRAINING PROGRAM

## 2023-08-11 PROCEDURE — 82805 BLOOD GASES W/O2 SATURATION: CPT

## 2023-08-11 PROCEDURE — 70551 MRI BRAIN STEM W/O DYE: CPT

## 2023-08-11 PROCEDURE — 25010000002 HYDROMORPHONE 1 MG/ML SOLUTION: Performed by: STUDENT IN AN ORGANIZED HEALTH CARE EDUCATION/TRAINING PROGRAM

## 2023-08-11 PROCEDURE — 82140 ASSAY OF AMMONIA: CPT | Performed by: INTERNAL MEDICINE

## 2023-08-11 PROCEDURE — 82948 REAGENT STRIP/BLOOD GLUCOSE: CPT

## 2023-08-11 PROCEDURE — 82375 ASSAY CARBOXYHB QUANT: CPT

## 2023-08-11 PROCEDURE — 83930 ASSAY OF BLOOD OSMOLALITY: CPT | Performed by: INTERNAL MEDICINE

## 2023-08-11 PROCEDURE — 80053 COMPREHEN METABOLIC PANEL: CPT | Performed by: INTERNAL MEDICINE

## 2023-08-11 RX ORDER — POTASSIUM CHLORIDE 29.8 MG/ML
20 INJECTION INTRAVENOUS
Status: COMPLETED | OUTPATIENT
Start: 2023-08-11 | End: 2023-08-11

## 2023-08-11 RX ORDER — DEXMEDETOMIDINE HYDROCHLORIDE 4 UG/ML
INJECTION, SOLUTION INTRAVENOUS
Status: COMPLETED
Start: 2023-08-11 | End: 2023-08-11

## 2023-08-11 RX ORDER — DILTIAZEM HYDROCHLORIDE 5 MG/ML
10 INJECTION INTRAVENOUS EVERY 6 HOURS PRN
Status: DISCONTINUED | OUTPATIENT
Start: 2023-08-11 | End: 2023-08-18 | Stop reason: HOSPADM

## 2023-08-11 RX ORDER — DEXMEDETOMIDINE HYDROCHLORIDE 4 UG/ML
0.2 INJECTION, SOLUTION INTRAVENOUS
Status: DISCONTINUED | OUTPATIENT
Start: 2023-08-11 | End: 2023-08-13

## 2023-08-11 RX ORDER — ARFORMOTEROL TARTRATE 15 UG/2ML
15 SOLUTION RESPIRATORY (INHALATION)
Status: DISCONTINUED | OUTPATIENT
Start: 2023-08-11 | End: 2023-08-16

## 2023-08-11 RX ORDER — DEXTROSE MONOHYDRATE, SODIUM CHLORIDE, AND POTASSIUM CHLORIDE 50; 1.49; 4.5 G/1000ML; G/1000ML; G/1000ML
175 INJECTION, SOLUTION INTRAVENOUS CONTINUOUS
Status: DISCONTINUED | OUTPATIENT
Start: 2023-08-11 | End: 2023-08-12

## 2023-08-11 RX ORDER — IPRATROPIUM BROMIDE AND ALBUTEROL SULFATE 2.5; .5 MG/3ML; MG/3ML
3 SOLUTION RESPIRATORY (INHALATION)
Status: DISCONTINUED | OUTPATIENT
Start: 2023-08-11 | End: 2023-08-12

## 2023-08-11 RX ORDER — BUDESONIDE 0.5 MG/2ML
1 INHALANT ORAL
Status: DISCONTINUED | OUTPATIENT
Start: 2023-08-11 | End: 2023-08-16

## 2023-08-11 RX ADMIN — HYDROMORPHONE HYDROCHLORIDE 2 MG: 1 INJECTION, SOLUTION INTRAMUSCULAR; INTRAVENOUS; SUBCUTANEOUS at 02:04

## 2023-08-11 RX ADMIN — LORAZEPAM 1 MG: 2 INJECTION INTRAMUSCULAR; INTRAVENOUS at 04:05

## 2023-08-11 RX ADMIN — KETOROLAC TROMETHAMINE 15 MG: 30 INJECTION, SOLUTION INTRAMUSCULAR; INTRAVENOUS at 11:18

## 2023-08-11 RX ADMIN — ENOXAPARIN SODIUM 70 MG: 100 INJECTION SUBCUTANEOUS at 08:44

## 2023-08-11 RX ADMIN — IPRATROPIUM BROMIDE AND ALBUTEROL SULFATE 3 ML: .5; 3 SOLUTION RESPIRATORY (INHALATION) at 18:01

## 2023-08-11 RX ADMIN — LORAZEPAM 1 MG: 2 INJECTION INTRAMUSCULAR; INTRAVENOUS at 11:24

## 2023-08-11 RX ADMIN — KETOROLAC TROMETHAMINE 15 MG: 30 INJECTION, SOLUTION INTRAMUSCULAR; INTRAVENOUS at 17:56

## 2023-08-11 RX ADMIN — Medication 10 ML: at 21:26

## 2023-08-11 RX ADMIN — METOPROLOL TARTRATE 5 MG: 1 INJECTION, SOLUTION INTRAVENOUS at 08:44

## 2023-08-11 RX ADMIN — IPRATROPIUM BROMIDE AND ALBUTEROL SULFATE 3 ML: .5; 3 SOLUTION RESPIRATORY (INHALATION) at 16:00

## 2023-08-11 RX ADMIN — Medication 10 ML: at 08:45

## 2023-08-11 RX ADMIN — Medication 10 ML: at 08:46

## 2023-08-11 RX ADMIN — HYDROMORPHONE HYDROCHLORIDE 1 MG: 1 INJECTION, SOLUTION INTRAMUSCULAR; INTRAVENOUS; SUBCUTANEOUS at 23:33

## 2023-08-11 RX ADMIN — AMIODARONE HYDROCHLORIDE 0.5 MG/MIN: 1.8 INJECTION, SOLUTION INTRAVENOUS at 03:20

## 2023-08-11 RX ADMIN — Medication 10 ML: at 08:44

## 2023-08-11 RX ADMIN — POTASSIUM CHLORIDE 20 MEQ: 29.8 INJECTION, SOLUTION INTRAVENOUS at 21:26

## 2023-08-11 RX ADMIN — METOPROLOL TARTRATE 5 MG: 1 INJECTION, SOLUTION INTRAVENOUS at 04:26

## 2023-08-11 RX ADMIN — DILTIAZEM HYDROCHLORIDE 10 MG: 5 INJECTION, SOLUTION INTRAVENOUS at 01:58

## 2023-08-11 RX ADMIN — HYDROMORPHONE HYDROCHLORIDE 0.5 MG: 1 INJECTION, SOLUTION INTRAMUSCULAR; INTRAVENOUS; SUBCUTANEOUS at 15:06

## 2023-08-11 RX ADMIN — POTASSIUM CHLORIDE 20 MEQ: 29.8 INJECTION, SOLUTION INTRAVENOUS at 22:55

## 2023-08-11 RX ADMIN — DEXMEDETOMIDINE HYDROCHLORIDE 400 MCG: 400 INJECTION INTRAVENOUS at 14:42

## 2023-08-11 RX ADMIN — AMIODARONE HYDROCHLORIDE 0.5 MG/MIN: 1.8 INJECTION, SOLUTION INTRAVENOUS at 14:41

## 2023-08-11 RX ADMIN — HYDROMORPHONE HYDROCHLORIDE 0.5 MG: 1 INJECTION, SOLUTION INTRAMUSCULAR; INTRAVENOUS; SUBCUTANEOUS at 18:32

## 2023-08-11 RX ADMIN — BUDESONIDE 1 MG: 0.5 INHALANT RESPIRATORY (INHALATION) at 18:01

## 2023-08-11 RX ADMIN — HYDROMORPHONE HYDROCHLORIDE 2 MG: 1 INJECTION, SOLUTION INTRAMUSCULAR; INTRAVENOUS; SUBCUTANEOUS at 07:27

## 2023-08-11 RX ADMIN — POTASSIUM CHLORIDE, DEXTROSE MONOHYDRATE AND SODIUM CHLORIDE 125 ML/HR: 150; 5; 450 INJECTION, SOLUTION INTRAVENOUS at 17:47

## 2023-08-11 RX ADMIN — ARFORMOTEROL TARTRATE 15 MCG: 15 SOLUTION RESPIRATORY (INHALATION) at 18:03

## 2023-08-11 RX ADMIN — METOPROLOL TARTRATE 5 MG: 1 INJECTION, SOLUTION INTRAVENOUS at 17:11

## 2023-08-11 RX ADMIN — HYDROMORPHONE HYDROCHLORIDE 0.5 MG: 1 INJECTION, SOLUTION INTRAMUSCULAR; INTRAVENOUS; SUBCUTANEOUS at 22:58

## 2023-08-11 RX ADMIN — METOPROLOL TARTRATE 5 MG: 1 INJECTION, SOLUTION INTRAVENOUS at 02:38

## 2023-08-11 RX ADMIN — DEXMEDETOMIDINE HYDROCHLORIDE 1 MCG/KG/HR: 400 INJECTION INTRAVENOUS at 17:58

## 2023-08-11 RX ADMIN — POTASSIUM CHLORIDE, DEXTROSE MONOHYDRATE AND SODIUM CHLORIDE 125 ML/HR: 150; 5; 450 INJECTION, SOLUTION INTRAVENOUS at 08:43

## 2023-08-11 RX ADMIN — Medication 2 SPRAY: at 23:00

## 2023-08-11 RX ADMIN — ENOXAPARIN SODIUM 70 MG: 100 INJECTION SUBCUTANEOUS at 21:26

## 2023-08-11 RX ADMIN — METOPROLOL TARTRATE 5 MG: 1 INJECTION, SOLUTION INTRAVENOUS at 21:27

## 2023-08-11 NOTE — CONSULTS
"DOS: 2023  NAME: Gabi Dudley   : 1945  PCP: Paul Quinteros MD  CC: Altered mentation  Referring MD: Milton Urban MD    Neurological Problem and Interval History:  78 y.o. right-handed white female with a Hx of recent abdominal surgery for volvulus fixation by Dr. Masha Isaac was noted by Dr. Megha Poe, the intensivist that she was not much responsive.  For that reason an altered mentation stage was noted and neurology consult was obtained.    When I saw the patient with the help of the dedicated telemedicine device I noticed that she had a BiPAP device on her face to keep her well ventilated.  I requested the nurse Lori to remove the BiPAP device from her face and to wake her up.  We suctioned her and noticed that she had a good cough response and she started waking up.  I also requested her to pinch the left upper extremity and she started waking up and started saying: \"Oh no\".  Then when we pinch her again she was not moving the left upper extremity away from the noxious stimulation but with her right hand she reached out to Lori and started saying \"why are you doing this to me?\"  When it was repeated again I noticed that she did not move the left upper extremity away from the noxious stimulation but she reached out with her right hand and started tapping the wrist of the registered nurse saying \"do not do that to me.\".  When removed down to the extremities I noticed that she was wiggling the toes and moving the right leg but not so much of the left leg.  She already had a CT scan done which is not showing any stroke but a lot of cortical atrophy.  For that reason I discussed about getting an urgent MRI of the brain without contrast with a stroke protocol to look for any right MCA territory infarct.  It is not exactly clear when this happened as she was last seen normal yesterday and also she had undergone surgical intervention the day before under general anesthesia.    She will not be a " candidate for any strong blood thinner because she had a recent surgery for the bowels.  She does not have any clips except for abdominal post incision closure clips which is superficial but not in her guts which I noticed through her procedure report.  The patient has been taken off the intravenous Precedex drip and for that reason she is more awake at this point.    Past Medical/Surgical Hx:  Past Medical History:   Diagnosis Date    Adrenal adenoma     Anemia     Arrhythmia     Asthma     Atrial fibrillation     Back pain     Benign colonic polyp     Benign tumor of adrenal gland     Cataract     bilateral    Cholelithiasis     Chronic bronchitis     Chronic bronchitis with COPD (chronic obstructive pulmonary disease)     COPD (chronic obstructive pulmonary disease) 2005    Coronary artery disease     Depression     Diverticulosis Years ago    Elevated cholesterol     Environmental and seasonal allergies     Fibromyalgia     Fibromyalgia, primary Sometime in the 90s    Gastritis     Generalized anxiety disorder     GERD (gastroesophageal reflux disease)     H/O mammogram     Headache Years ago    Hemorrhoids     History of blood transfusion 1985    History of blood transfusion 1985    History of echocardiogram     History of endometriosis     History of nuclear stress test     Hospitalization or health care facility admission within last 6 months     5 times between 11/2022-05/2023    Hypertension     IBS (irritable bowel syndrome)     Impaired functional mobility, balance, gait, and endurance     Impaired mobility     Inverted nipple     Kidney disease     Kidney stone     Liver cyst     Low back pain Years ago    Nodular radiologic density     Nodule of left lung     On home oxygen therapy     2 liters NC QHS    Osteoarthritis     Osteopenia Several years ago    Osteoporosis     PONV (postoperative nausea and vomiting)     Renal cyst     Sinus problem     2014    Sinusitis     Skin cancer     basal cell  carcinoma    SOB (shortness of breath)     Tobacco use     Urinary frequency     Urinary tract infection Years ago    Frequent UTIs    Vitamin D deficiency     Wears glasses     Wears partial dentures     upper plate     Past Surgical History:   Procedure Laterality Date    APPENDECTOMY  1980s    CARDIAC CATHETERIZATION  2003    CATARACT EXTRACTION  2013    both eyes    CHOLECYSTECTOMY  2020    CHOLECYSTECTOMY WITH INTRAOPERATIVE CHOLANGIOGRAM N/A 10/30/2020    Procedure: CHOLECYSTECTOMY LAPAROSCOPIC INTRAOPERATIVE CHOLANGIOGRAPHY;  Surgeon: Sima Moses MD;  Location: [facility] OR;  Service: General;  Laterality: N/A;    COLON SURGERY  2020    COLONOSCOPY  03/11/2013    COLONOSCOPY  06/21/2016    COLONOSCOPY N/A 07/24/2019    Procedure: COLONOSCOPY W/ COLD FORCEP POLYPECTOMIES; HOT SNARE POLYPECTOMIES; COLD SNARE POLYPECTOMY;  Surgeon: Goyo Nunez MD;  Location: [facility] ENDOSCOPY;  Service: Gastroenterology    COLONOSCOPY W/ BIOPSIES AND POLYPECTOMY      EXPLORATORY LAPAROTOMY N/A 11/23/2020    Procedure: colectomy, right, closure of enterotomy x 2, reduction of internal volvulus;  Surgeon: Sima Moses MD;  Location: [facility] OR;  Service: General;  Laterality: N/A;    EXPLORATORY LAPAROTOMY N/A 8/9/2023    Procedure: LAPAROTOMY EXPLORATORY WITH LYIS OF ADHESIONS AND  CENTRAL LINE INSERTION;  Surgeon: Bianca Isaac DO;  Location: [facility] OR;  Service: General;  Laterality: N/A;    HYSTERECTOMY  1980s    partial    LYSIS OF ABDOMINAL ADHESIONS      TONSILLECTOMY  1987    UPPER GASTROINTESTINAL ENDOSCOPY  01/13/2015    VAGINAL DELIVERY      x2       Review of Systems:    Constitutional: Pleasant lady currently resting comfortably with a BiPAP device.  Cardiovascular: No chest pain or palpitations noted.  Respiratory: To avoid intubation the patient had been placed on a BiPAP device but her oxygen saturation has been holding good so far.  Gastrointestinal: Recently had volvulus reduction surgery performed  by Dr. Masha Isaac.  Please review her procedure notes for the details.  Genitourinary: Has a Lui catheter in place.  Musculoskeletal: Moving the right side well.  Does not move the left side much.  Dermatological: Incisional site seems to be clean.  Neurological: Hard to calculate NIH stroke scale but it seems that she has weakness in the left upper and lower extremities and her speech is normal and she is moving her head back-and-forth.  Psychiatric: Unable to assess at this time.  Ophthalmological: Unable to assess at this time.          Medications On Admission  Medications Prior to Admission   Medication Sig Dispense Refill Last Dose    apixaban (Eliquis) 5 MG tablet tablet Take 1 tablet by mouth Every 12 (Twelve) Hours. 60 tablet 11 Past Week    atorvastatin (LIPITOR) 20 MG tablet TAKE 1 TABLET BY MOUTH DAILY 90 tablet 3 Past Week    Budeson-Glycopyrrol-Formoterol (Breztri Aerosphere) 160-9-4.8 MCG/ACT aerosol inhaler Inhale 2 puffs 2 (Two) Times a Day. Rinse mouth out after use 3 each 3 Past Week    cetirizine (zyrTEC) 10 MG tablet TAKE 1 TABLET BY MOUTH DAILY. 30 tablet 2 Past Week    clonazePAM (KlonoPIN) 0.5 MG tablet Take 1 tablet by mouth Daily. 30 tablet 2 Past Week    dilTIAZem CD (CARDIZEM CD) 120 MG 24 hr capsule Take 2 capsules by mouth Daily. 60 capsule 11 Past Week    DULoxetine (CYMBALTA) 60 MG capsule Take 1 capsule by mouth Daily. 180 capsule 3 Past Week    fluticasone (FLONASE) 50 MCG/ACT nasal spray 2 sprays into the nostril(s) as directed by provider Daily.   Past Week    metoprolol succinate XL (TOPROL-XL) 25 MG 24 hr tablet Take 1 tablet by mouth Daily. 30 tablet 11 Past Week    predniSONE (DELTASONE) 10 MG tablet Take 1 tablet by mouth Daily.   Past Week    albuterol sulfate  (90 Base) MCG/ACT inhaler Inhale 2 puffs Every 4 (Four) Hours As Needed for Wheezing. 18 g 5 Unknown    Cholecalciferol (vitamin D3) 125 MCG (5000 UT) capsule capsule Take 1 capsule by mouth Daily.    Unknown    ferrous sulfate 324 (65 Fe) MG tablet delayed-release EC tablet Take 1 tablet by mouth Daily With Breakfast. (Patient not taking: Reported on 2023) 30 tablet  Not Taking    ipratropium-albuterol (DUO-NEB) 0.5-2.5 mg/3 ml nebulizer USE 3 mL VIA NEBULIZER EVERY 4 HOURS AS NEEDED FOR WHEEZING 360 mL 11 Unknown    O2 (OXYGEN) Inhale 2 L/min As Needed.   Unknown    oxyCODONE-acetaminophen (PERCOCET) 7.5-325 MG per tablet Take 1 tablet by mouth Every 4 (Four) Hours As Needed for Moderate Pain. (Patient taking differently: Take 1 tablet by mouth Every 6 (Six) Hours As Needed for Moderate Pain.) 180 tablet 0 Unknown    pantoprazole (PROTONIX) 40 MG EC tablet TAKE 1 TABLET BY MOUTH DAILY 90 tablet 3 Unknown    predniSONE (DELTASONE) 20 MG tablet Take 2 tablets by mouth Daily. (Patient not taking: Reported on 2023) 10 tablet 0 Not Taking    QUEtiapine (SEROquel) 25 MG tablet Take 0.5 tablets by mouth Every Night. 30 tablet 2 Unknown    sertraline (ZOLOFT) 100 MG tablet TAKE 1 & 1/2 TABLET BY MOUTH DAILY 135 tablet 3 Unknown    sodium chloride 0.65 % nasal spray 2 sprays into the nostril(s) as directed by provider As Needed (dry nose).  12 Unknown    traZODone (DESYREL) 100 MG tablet 1 qhs po prn 30 tablet 2 Unknown       Allergies:  Allergies   Allergen Reactions    Morphine Confusion, Irritability and Hallucinations    Doxycycline GI Intolerance       Social Hx:  Social History     Socioeconomic History    Marital status:    Tobacco Use    Smoking status: Former     Packs/day: 0.25     Years: 30.00     Pack years: 7.50     Types: Cigarettes     Quit date: 2020     Years since quittin.7     Passive exposure: Past    Smokeless tobacco: Never   Vaping Use    Vaping Use: Never used   Substance and Sexual Activity    Alcohol use: No    Drug use: No    Sexual activity: Not Currently     Birth control/protection: Post-menopausal       Family Hx:  Family History   Problem Relation Age of Onset     Stomach cancer Brother     Colon cancer Maternal Aunt     Brain cancer Father     Arthritis Father     Hypertension Father     Cancer Father         Father, brother, and aunt    Lung cancer Paternal Grandfather     Heart disease Mother         Mother passed away due to heart disease    Breast cancer Neg Hx     Ovarian cancer Neg Hx        Review of Imaging (Interpretation of images not reports): CT scan of the abdomen and pelvis with contrast from August 8, 2023 was reviewed that showed the following:    FINDINGS:  Abdomen: The gallbladder has been removed.  Again seen are  bilateral renal cysts.  There is a 2.9 cm left adrenal nodule,  likely an adenoma in the absence of a known malignancy.  The  other solid abdominal organs and ureters are unremarkable. There  are moderately dilated multiple loops of proximal and mid small  bowel with collapsed distal small bowel consistent with a  high-grade small bowel obstruction.  No convincing passage of  contrast into the collapsed distal small bowel.  There are  postoperative changes of the cecum, possibly an appendectomy.  There is left-sided diverticulosis.  The GI tract is otherwise  unremarkable.  Pelvis: The urinary bladder is unremarkable.  The  uterus and ovaries are not visualized.  There is no pelvic or  abdominal ascites, adenopathy or acute osseous abnormality.     IMPRESSION:  High-grade distal small bowel obstruction.    CT Head Without Contrast    Result Date: 8/11/2023  PROCEDURE: CT HEAD WO CONTRAST-  HISTORY: Mental status change, unknown cause; K56.600-Partial intestinal obstruction, unspecified as to cause; I48.91-Unspecified atrial fibrillation  COMPARISON: None.  TECHNIQUE: Multiple axial CT images were performed from the foramen magnum to the vertex. Individualized dose reduction techniques using automated exposure control or adjustment of mA and/or kV according to the patient size were employed.  FINDINGS: There is moderate, age-appropriate  generalized cerebral atrophy. The ventricles are enlarged, appropriate for atrophy. There is mild asymmetry of the frontal horns of the lateral ventricles. Mild small vessel ischemic disease noted. There is no evidence of edema or hemorrhage.  No masses are identified. No extra-axial fluid is seen. The paranasal sinuses are unremarkable.      Impression: Atrophy  without acute process.       This report was signed and finalized on 8/11/2023 9:49 AM by Angeles Collins MD.         Reviewed the MRI films in details  on the PACS that shows the following:    FINDINGS:  Diffusion-weighted images show no evidence of acute infarction.  The ventricles are dilated, but proportionate to the degree of  generalized atrophy. Periventricular and deep white matter  signal abnormality is consistent with changes of chronic small  vessel ischemia. There is no mass or shift of midline  structures. There is no intracranial hemorrhage. No significant  sinus or osseous abnormality is seen. Appropriate signal flow  voids are seen in the major intracranial vessels on T2-weighted  images.     IMPRESSION:  No acute intracranial abnormality. Specifically, no infarction  is identified on diffusion-weighted imaging.    Additional Tests Performed: A recent portable chest x-ray shows the following:    COMPARISON: None.     FINDINGS: Nasogastric tube seen with the tip coursing below the level  the diaphragm. There is a new right IJ catheter with the tip terminate  in the mid SVC. The patient is rotated to the right. The heart is normal  in size. The mediastinum is unremarkable. There is stable mild  interstitial disease within the lungs. There is no pneumothorax.  There  are no acute osseous abnormalities.     IMPRESSION:  Stable mild interstitial disease.     Support tubes and lines as described.       Results for orders placed in visit on 11/02/22    Adult Transthoracic Echo Complete W/ Cont if Necessary Per Protocol    Interpretation Summary  1.   "Normal left ventricular size and systolic function, LVEF 60-65%.  2.  Mild concentric LVH.  3.  Normal LV diastolic filling pattern.  4.  Normal right ventricular size and systolic function.  5.  Mildly increased left atrial volume index.  6.  No significant valvular abnormalities.            Laboratory Results:   Lab Results   Component Value Date    GLUCOSE 156 (H) 08/11/2023    CALCIUM 7.3 (L) 08/11/2023     (H) 08/11/2023    K 3.6 08/11/2023    CO2 37.9 (H) 08/11/2023     (H) 08/11/2023    BUN 38 (H) 08/11/2023    CREATININE 0.48 (L) 08/11/2023    EGFRIFAFRI 80 11/16/2021    EGFRIFNONA 98 12/14/2021    BCR 79.2 (H) 08/11/2023    ANIONGAP 4.1 (L) 08/11/2023     Lab Results   Component Value Date    WBC 8.94 08/11/2023    HGB 9.3 (L) 08/11/2023    HCT 34.9 08/11/2023    MCV 87.0 08/11/2023     08/11/2023     Lab Results   Component Value Date    CHOL 177 01/09/2017    CHOL 157 07/11/2016     Lab Results   Component Value Date    HDL 79 (H) 05/06/2022    HDL 71 (H) 11/16/2021    HDL 72 (H) 10/27/2020     Lab Results   Component Value Date    LDL 74 05/06/2022    LDL 76 11/16/2021    LDL 63 10/27/2020     Lab Results   Component Value Date    TRIG 62 05/06/2022    TRIG 92 11/16/2021    TRIG 68 10/27/2020     Lab Results   Component Value Date    HGBA1C 6.20 (H) 01/09/2017     Lab Results   Component Value Date    INR 1.07 11/16/2021    INR 0.97 12/28/2015    PROTIME 10.2 12/28/2015     Lab Results   Component Value Date    [id number] 608 10/27/2020     No results found for: FOLATE         Physical Examination:  /66   Pulse 65   Temp 97.8 øF (36.6 øC) (Temporal)   Resp 22   Ht 165.1 cm (65\")   Wt 76.4 kg (168 lb 6.9 oz)   SpO2 98%   BMI 28.03 kg/mý   General Appearance:   Well developed, well nourished, well groomed, alert, and cooperative.  HEENT: Normocephalic.    Neck and Spine: Normal range of motion.  Normal alignment. No mass or tenderness. No bruits.    Extremities:    No edema " or deformities. Normal joint ROM.   Skin:    No rashes or birth marks.    Neurological examination:  Higher Integrative  Function: Hard to perform a full cognitive evaluation but she was arousable to her name and she started saying full sentences as discussed above when noxious stimulation was applied to different parts of the body..  CN II:  Pupils are equal, round, and reactive to light. Normal visual acuity and visual fields.    CN III IV VI: Extraocular movements are full without nystagmus.   CN V:  Normal facial sensation and strength of muscles of mastication.  CN VII:  Facial movements are symmetric. No weakness.  CN VIII: Auditory acuity is normal.  CN IX & X: Symmetric palatal movement.  CN XI:  Sternocleidomastoid and trapezius are normal.  No weakness.  CN XII:  The tongue is midline.  No atrophy or fasciculations.  Motor:  She was moving the right upper and lower extremities well.  The left upper extremity she did not withdraw to the painful stimuli and same thing was noted in the left lower extremity which she did not withdraw to the painful stimuli.  She moves the right side well without any issues..  Sensation: She can appreciate noxious stimulation in all extremities..  Station and Gait: Unable to assess at this time..    Coordination:  Although she could not comprehend how to do a formal finger-to-nose coordination testing I noticed she was coordinated enough to bring her right arm above her chest to move the wrist of the registered nurse away from her so that she does not pinch her anymore..    NIHSS:    Baseline  0-->Alert: keenly responsive  1-->Answers one question correctly  0-->Performs both tasks correctly  0=normal  0=No visual loss  0=Normal symmetric movement  3-->No effort against gravity: limb falls  0-->No drift: limb holds 90 (or 45) degrees for full 10 secs  3-->No effort against gravity: limb falls  0-->No drift: limb holds 90 (or 45) degrees for full 10 secs  0=Absent  0=Normal; no  sensory loss  0=No aphasia, normal  0=Normal  0=No abnormality    Total score: 7    Diagnoses / Discussion:  78 y.o. who presents with Sx of acute abdomen initially and then had surgical intervention.  However it was noted by the intensivist that she was not much responsive like earlier she was and so neurology consultation was performed.  Currently it seems that she has paucity of movement of the left side and she is not moving the left upper extremity of the left lower extremity as she is doing on the right side although she can appreciate noxious stimulation in the left side well.  She has been talking in full sentences as discussed above.  However the MRI is not showing any evidence of any acute ischemia    Plan:  Patient is not showing any evidence of any acute ischemia on the neuroimaging.    Try to avoid narcotics and sedatives to see how she does over the next 24 hours..    I have discussed the above with the care team.  She will be followed up by the inpatient telemetry neurology team.  Time spent with patient: 70 minutes in face-to-face evaluation and management of the patient using the dedicated telemedicine device without any interruption with the help of Lori Gardiner the intensive care unit nurse with the patient located at the [facility] and myself at a remote location.    Electronically signed by Jamal Simon MD, 08/11/23, 5:46 PM EDT.    Dictated using Dragon dictation.

## 2023-08-11 NOTE — PLAN OF CARE
Goal Outcome Evaluation:  Plan of Care Reviewed With: patient, son           Outcome Evaluation: Amio gtt continued per order. Patient has been in afib most of shift but converted this AM. VSS on 3LNC. NG to LIWS, external catheter in place. Minimal UOP. PRN medications given throughout shift. Patient appears in pain most of the time. Currently resting comfortably. No complaints at this time.

## 2023-08-11 NOTE — PROGRESS NOTES
LOS: 2 days   Patient Care Team:  Paul Quinteros MD as PCP - General (Internal Medicine)  Sima Moses MD as Consulting Physician (General Surgery)  Taiwo Curry MD as Consulting Physician (Cardiology)  Soledad Stauffer, RN as Ambulatory  ([facility])       Chief Complaint: POD2 exploratory laparotomy, adhesiolysis, reduction of small bowel volvulus    HPI: Patient seen and examined in ICU at bedside. Patient has become progressively more encephalopathic and is now on BiPAP. She is restrained and A&Ox0. She is not complaining of pain. Lui was removed yesterday and she has required multiple straight catheterizations. No flatus, no bowel movement. NG in place with 400 cc overnight.     Vital Signs  Temp:  [97.3 øF (36.3 øC)-98.6 øF (37 øC)] 97.8 øF (36.6 øC)  Heart Rate:  [] 80  Resp:  [16-20] 16  BP: ()/(59-90) 126/66    Physical Exam:     General Appearance:  Awake and agitated, BiPAP in place.    Respiratory/Lungs:   Breath sounds heard bilaterally equally.  No crackles or wheezing. No Pleural rub or bronchial breathing. Normal respiratory effort.    Cardiovascular/Heart:  Normal S1 and S2, no murmur. No edema   GI/Abdomen:   Soft, non-tender to palpation, somewhat distended, dressing taken down, incision is clean, dry, and intact, no drainage.  Bowel sounds present in RUQ and LUQ.              Musculoskeletal/ Extremities:   Moves all extremities well   Skin: Diffuse bruising from recent falls   Psychiatric : Agitated, not answering questions appropriately, A&Ox0   Neurologic: Cranial nerves 2 - 12 grossly intact, sensation intact, Motor power is normal and equal bilaterally.     Results Review:       Lab Results (last 24 hours)       Procedure Component Value Units Date/Time    Ammonia [[id number]]  (Abnormal) Collected: 08/11/23 1208    Specimen: Blood Updated: 08/11/23 1238     Ammonia 60 umol/L     Blood Gas, Arterial With Co-Ox [[id number]]  (Abnormal) Collected:  08/11/23 1215    Specimen: Arterial Blood Updated: 08/11/23 1215     Site Arterial Line     Glenn's Test N/A     pH, Arterial 7.282 pH units      Comment: 84 Value below reference range        pCO2, Arterial 88.1 mm Hg      Comment: 86 Value above critical limit        pO2, Arterial 133.0 mm Hg      Comment: 83 Value above reference range        HCO3, Arterial 41.6 mmol/L      Comment: 83 Value above reference range        Base Excess, Arterial 12.7 mmol/L      Comment: 83 Value above reference range        O2 Saturation, Arterial 99.4 %      Comment: 83 Value above reference range        Hematocrit, Blood Gas 25.9 %      Comment: 84 Value below reference range        Oxyhemoglobin 96.7 %      Methemoglobin 1.00 %      Carboxyhemoglobin 1.7 %      A-a DO2 16.2 mmHg      Barometric Pressure for Blood Gas 731 mmHg      Modality Nasal Cannula     FIO2 36 %      Flow Rate 4.0 lpm      Ventilator Mode NA     Note --     Notified Who RN     Notified By MAY     Notified Time 08/11/2023 12:16     Collected by CJ     Comment: Meter: [id number]     :  MAY        pH, Temp Corrected --     pCO2, Temperature Corrected --     pO2, Temperature Corrected --    POC Glucose Once [[id number]]  (Abnormal) Collected: 08/11/23 1128    Specimen: Blood Updated: 08/11/23 1132     Glucose 161 mg/dL      Comment: Serial Number: [id number]:  [id number]       Sodium, Urine, Random - Urine, Clean Catch [[id number]] Collected: 08/11/23 0846    Specimen: Urine, Clean Catch Updated: 08/11/23 1013     Sodium, Urine <20 mmol/L     Narrative:      Reference intervals for random urine have not been established.  Clinical usage is dependent upon physician's interpretation in combination with other laboratory tests.       Osmolality, Urine - Urine, Clean Catch [[id number]] Collected: 08/11/23 0847    Specimen: Urine, Clean Catch Updated: 08/11/23 0917    Creatinine Urine Random (kidney function) GFR component - Urine, Clean Catch  [[id number]] Collected: 08/11/23 0847    Specimen: Urine, Clean Catch Updated: 08/11/23 0917    Osmolality, Serum [[id number]] Collected: 08/11/23 0809    Specimen: Blood Updated: 08/11/23 0816    Comprehensive Metabolic Panel [[id number]]  (Abnormal) Collected: 08/11/23 0359    Specimen: Blood Updated: 08/11/23 0455     Glucose 139 mg/dL      BUN 37 mg/dL      Creatinine 0.64 mg/dL      Sodium 151 mmol/L      Potassium 3.9 mmol/L      Chloride 108 mmol/L      CO2 38.3 mmol/L      Calcium 7.4 mg/dL      Total Protein 5.7 g/dL      Albumin 3.4 g/dL      ALT (SGPT) 24 U/L      AST (SGOT) 28 U/L      Alkaline Phosphatase 53 U/L      Total Bilirubin 0.2 mg/dL      Globulin 2.3 gm/dL      A/G Ratio 1.5 g/dL      BUN/Creatinine Ratio 57.8     Anion Gap 4.7 mmol/L      eGFR 91.7 mL/min/1.73     Narrative:      GFR Normal >60  Chronic Kidney Disease <60  Kidney Failure <15    The GFR formula is only valid for adults with stable renal function between ages 18 and 70.    CBC (No Diff) [[id number]]  (Abnormal) Collected: 08/11/23 0359    Specimen: Blood Updated: 08/11/23 0452     WBC 8.94 10*3/mm3      RBC 4.01 10*6/mm3      Hemoglobin 9.3 g/dL      Hematocrit 34.9 %      MCV 87.0 fL      MCH 23.2 pg      MCHC 26.6 g/dL      RDW 19.5 %      RDW-SD 61.9 fl      MPV 11.6 fL      Platelets 174 10*3/mm3                 Assessment & Plan       Partial small bowel obstruction    Patient is POD2 exploratory laparotomy, adhesiolysis, and reduction of small bowel volvulus. Patient has become progressively more encephalopathic that is likely multi-factorial secondary to dilaudid and respiratory failure. ABG showing significant hypercarbia. She has a history of severe COPD on steroids. Consulted pulmonology service. Decreased dilaudid dose, use sparingly if patient is complaining of severe pain. Currently no complaints even with palpation. Abdominal examination is benign. Maintain NG to LIS. If patient requires further straight  catheterization, insert Lui catheter.     Bianca Isaac DO  08/11/23  12:55 EDT

## 2023-08-11 NOTE — THERAPY TREATMENT NOTE
Pt treatment held d/t pt responding to painful stimuli only at this time. Will f/u with pt tomorrow.

## 2023-08-11 NOTE — PROGRESS NOTES
[facility]    Name:  Gabi Dudley   Age:  78 y.o.  Sex:  female  :  1945  MRN:  [id number]   Visit Number:  [id number]  Admission Date:  2023  Date Of Service:  23  Primary Care Physician:  Paul Quinteros MD     LOS: 2 days :    Chief Complaint:      Unable to    Subjective:    2023 patient with disorientation this morning.  Decreased pain medicine dose ordered CT of the head which was negative for acute process.  Hospital course:  8/10/2023: Patient converted to sinus rhythm this morning.  Dr. Curry's note reviewed.  Was taken to the OR for adhesiolysis by Dr. Isaac with assist from Dr. Bowden last evening.  Currently n.p.o. with NG tube in place.  2023 patient with RVR overnight was started on Cardizem but that did not help.  Persistent pain discussed with Dr. Isaac taking  to the OR.  Discussed with Dr. Curry who recommended amiodarone and discussed perioperative risk management with Dr. Isaac.  2023 Pain not well controlled increased morphine dose from 1 mg to 2 mg    History Of Presenting Illness:       Patient is a chronically ill 78-year-old female history significant for hypertension, hyperlipidemia, depression/anxiety and CAD who presents to the emergency room with 3 to 4 days of progressively worsening abdominal pain.  Patient describes the pain as intermittent and sharp, diffuse across her abdomen.  Patient has chronic constipation so cannot recall her last bowel movement.  Admits to some mild nausea, denies vomiting.  Patient does have a history of small bowel obstruction which required colectomy .  States pain is similar but not as severe as previous episode.  Nothing makes it better or worse.  Patient does admit to still passing gas.  Upon arrival to the ER, patient afebrile hemodynamically stable and nonhypoxic.  CMP unremarkable except for glucose 142.  Lactate lipase normal.  WBC 13, hemoglobin hematocrit at  baseline, platelets 112.  UA negative for urinary tract infection.  CT abdomen pelvis revealed mid small bowel dilation worrisome for partial small bowel obstruction.  Dr. Isaac agreed to consult hospital service asked to admit.  Edited by: Milton rUban DO at 8/11/2023 1151     Review of Systems:     All systems were reviewed and negative except as mentioned in subjective, assessment and plan.    Vital Signs:    Temp:  [97.3 øF (36.3 øC)-98.6 øF (37 øC)] 97.8 øF (36.6 øC)  Heart Rate:  [] 80  Resp:  [16-20] 16  BP: ()/(59-90) 126/66    Intake and output:    I/O last 3 completed shifts:  In: 6255 [I.V.:6255]  Out: 1725 [Urine:1125; Emesis/NG output:600]  I/O this shift:  In: -   Out: 250 [Urine:250]    Physical Examination:    Physical Exam  Vitals reviewed.   Constitutional:       Appearance: Ill-appearing not answering questions or following commands  HENT:      Head: Normocephalic and atraumatic.      Right Ear: External ear normal.      Left Ear: External ear normal.      Mouth/Throat:      Mouth: Mucous membranes are moist.      Pharynx: Oropharynx is clear.   Eyes:      Extraocular Movements: Extraocular movements intact.      Conjunctiva/sclera: Conjunctivae normal.   Cardiovascular:      Rate and Rhythm: Normal rate and regular rhythm.      Pulses: Normal pulses.      Heart sounds: Normal heart sounds.   Pulmonary:      Effort: Pulmonary effort is normal.      Breath sounds: Expiratory wheeze noted bilaterally.  Abdominal:      General: Bowel sounds are decreased.  Mild distension.     Comments: No rebound or guarding  Musculoskeletal:         General: Normal range of motion.      Right lower leg: No edema.      Left lower leg: No edema.   Skin:     General: Skin is warm and dry.   Neurological:      Mental Status: She decreased level of consciousness, not answering questions  Psychiatric:         Behavior: Behavior normal  Edited by: Milton Urban DO at 8/11/2023 1943      Laboratory results:    Results from last 7 days   Lab Units 08/11/23  0359 08/10/23  0342 08/09/23  0701 08/08/23  0523 08/07/23  1535   SODIUM mmol/L 151* 151* 146*   < > 145   POTASSIUM mmol/L 3.9 3.8 3.6   < > 4.4   CHLORIDE mmol/L 108* 107 103   < > 102   CO2 mmol/L 38.3* 37.1* 37.9*   < > 34.8*   BUN mg/dL 37* 30* 29*   < > 23   CREATININE mg/dL 0.64 0.72 0.54*   < > 0.65   CALCIUM mg/dL 7.4* 7.7* 8.1*   < > 9.2   BILIRUBIN mg/dL 0.2 0.3  --   --  0.3   ALK PHOS U/L 53 45  --   --  59   ALT (SGPT) U/L 24 9  --   --  12   AST (SGOT) U/L 28 14  --   --  19   GLUCOSE mg/dL 139* 155* 115*   < > 142*    < > = values in this interval not displayed.     Results from last 7 days   Lab Units 08/11/23  0359 08/10/23  0342 08/09/23  0701   WBC 10*3/mm3 8.94 6.29 4.05   HEMOGLOBIN g/dL 9.3* 9.6* 7.8*   HEMATOCRIT % 34.9 35.6 29.4*   PLATELETS 10*3/mm3 174 172 143                 Recent Labs     11/21/22  1027 01/23/23  0050 05/22/23  1843   PHART 7.443 7.334* 7.318*   PNS0AOM 47.0* 67.6* 74.3*   PO2ART 68.9* 156.0* 69.2*   UOG5LXC 32.1* 36.0* 38.1*   BASEEXCESS 7.1* 8.7* 10.6*      I have reviewed the patient's laboratory results.    Radiology results:    CT Head Without Contrast    Result Date: 8/11/2023  PROCEDURE: CT HEAD WO CONTRAST-  HISTORY: Mental status change, unknown cause; K56.600-Partial intestinal obstruction, unspecified as to cause; I48.91-Unspecified atrial fibrillation  COMPARISON: None.  TECHNIQUE: Multiple axial CT images were performed from the foramen magnum to the vertex. Individualized dose reduction techniques using automated exposure control or adjustment of mA and/or kV according to the patient size were employed.  FINDINGS: There is moderate, age-appropriate generalized cerebral atrophy. The ventricles are enlarged, appropriate for atrophy. There is mild asymmetry of the frontal horns of the lateral ventricles. Mild small vessel ischemic disease noted. There is no evidence of edema or  hemorrhage.  No masses are identified. No extra-axial fluid is seen. The paranasal sinuses are unremarkable.      Impression: Atrophy  without acute process.       This report was signed and finalized on 8/11/2023 9:49 AM by Angeles Collins MD.      XR Chest 1 View    Result Date: 8/9/2023  PROCEDURE: XR CHEST 1 VW-  HISTORY: Line placement; K56.600-Partial intestinal obstruction, unspecified as to cause; I48.91-Unspecified atrial fibrillation  COMPARISON: None.  FINDINGS: Nasogastric tube seen with the tip coursing below the level the diaphragm. There is a new right IJ catheter with the tip terminate in the mid SVC. The patient is rotated to the right. The heart is normal in size. The mediastinum is unremarkable. There is stable mild interstitial disease within the lungs. There is no pneumothorax.  There are no acute osseous abnormalities.      Impression: Stable mild interstitial disease.  Support tubes and lines as described.     Images were reviewed, interpreted, and dictated by Dr. Angeles Collins MD Transcribed by Mickey Perez PA-C.  This report was signed and finalized on 8/9/2023 3:17 PM by Angeles Collins MD.      Peripheral Block    Result Date: 8/9/2023  Elena Torres CRNA     8/9/2023 12:46 PM Peripheral Block Pre-sedation assessment completed: 8/9/2023 10:15 AM Patient reassessed immediately prior to procedure Start time: 8/9/2023 1:23 PM Stop time: 8/9/2023 1:27 PM Reason for block: at surgeon's request and post-op pain management Performed by CRNA/CAA: Elena Torres CRNA Preanesthetic Checklist Completed: patient identified, IV checked, site marked, risks and benefits discussed, surgical consent, monitors and equipment checked, pre-op evaluation and timeout performed Prep: Pt Position: supine Sterile barriers:gloves, cap, sterile barriers and mask Prep: ChloraPrep Patient monitoring: blood pressure monitoring, continuous pulse oximetry and EKG Procedure Sedation: yes Performed under: general  Guidance:ultrasound guided ULTRASOUND INTERPRETATION.  Using ultrasound guidance a 20 G gauge needle was placed in close proximity to the nerve, at which point, under ultrasound guidance anesthetic was injected in the area of the nerve and spread of the anesthesia was seen on ultrasound in close proximity thereto.  There were no abnormalities seen on ultrasound; a digital image was taken; and the patient tolerated the procedure with no complications. Images:still images obtained Laterality:Bilateral Block Type:TAP Injection Technique:single-shot Needle Type:echogenic Resistance on Injection: none Medications Used: bupivacaine PF (MARCAINE) injection 0.5% - Injection  12.5 mL - 8/9/2023 1:24:00 PM  12.5 mL - 8/9/2023 1:27:00 PM Medications Preservative Free Saline:25ml Comment:Block Injection: LA dose divided between Right and Left Block Adjuncts per total volume of LA: If required, intravenous sedation was given -- see meds on anesthesia record. 0.5% Marcaine mixed to make 0.25% marcaine Post Assessment Injection Assessment: negative aspiration for heme, no paresthesia on injection and incremental injection Patient Tolerance:comfortable throughout block Complications:no Additional Notes Procedure:      BILATERAL TAP BLOCKS                          Patient analgesia was achieved with General Anesthesia The pt was placed in the Supine Position and under Ultrasound guidance, an echogenic or touhy needle was advanced with Normal Saline hydro dissection of tissue.  The Internal Oblique and Transversus Abdominus muscles were visualized.  At or before the aponeurosis of Internal Oblique, the local anesthetic spread was visualized in the Transversus Abdominus Plane. Injection was made incrementally with aspiration every 5 mls.  There was no intravascular injection;  injection pressure was normal; there was no neural injection; and the procedure was completed without difficulty.    I have reviewed the patient's radiology  reports.    Medication Review:     I have reviewed the patient's active and prn medications.     Problem List:      Partial small bowel obstruction      Assessment/Plan:    Partial small bowel obstruction due to volvulus.    History of colectomy due to obstruction in 2018  Paroxysmal A-fib with RVR   Hypertension  Hyperlipidemia  Depression/anxiety  GERD  Delirium    -Status post adhesiolysis 8/9/2023 Dr. Isaac  -NG tube in place, receiving IV fluids  -Continue IV amiodarone per Dr. Curry's recommendations transition to oral once tolerating  -8/11/2023 given worsening cognition CT of the head was ordered which was stable.  Will check a arterial blood gas, blood sugar normal, BUN only mildly elevated, no leukocytosis, will check an ammonia level, decreased pain medicine, will consult neurology        Edited by: Milton Urban DO at 8/11/2023 1154       DVT Prophylaxis: Lovenox  Code Status:   Code Status and Medical Interventions:   Ordered at: 08/07/23 2023     Code Status (Patient has no pulse and is not breathing):    CPR (Attempt to Resuscitate)     Medical Interventions (Patient has pulse or is breathing):    Full Support     Release to patient:    Routine Release      Diet:   Dietary Orders (From admission, onward)       Start     Ordered    08/07/23 2023  NPO Diet NPO Type: Strict NPO  Diet Effective Now        Question:  NPO Type  Answer:  Strict NPO    08/07/23 2023                   Discharge Plan: Anticipate short-term rehab discharge in 3 to 5 days    Milton Urban DO  08/11/23  12:14 EDT    Dictated utilizing Dragon dictation.

## 2023-08-11 NOTE — CONSULTS
Date of admission:  8/7/2023    Date of consultation:   August 11, 2023    Requested by:   Hospitalist Service and general surgery    PCP: Paul Quinteros MD    Reason:  Acute Respiratory Failure.     History of Present Illness:  78 y.o. female with past medical history significant for severe COPD and somewhat noncompliance who was initially admitted with abdominal pain and was found to have partial small bowel obstruction.  She was taken to the OR on 8/9/2023 and underwent exploratory laparotomy, lysis of adhesions and reduction of small bowel volvulus.  Over the past 24 to 48 hours, the patient has become somewhat more disoriented/lethargic and an ABG was performed that showed significant respiratory acidosis.    she was placed on PAP device and pulmonary consultation was requested for further recommendations.     No further history is available from the patient, as she is on the PAP device.    Review of System: Could not be obtained, as the patient is on PAP device.     Past Medical History: Pertinent history reviewed, as appropriate. Negative, except noted below or in HPI.  If this history could not be obtained due to a reason, the reason is listed in the HPI.  Past Medical History:   Diagnosis Date    Adrenal adenoma     Anemia     Arrhythmia     Asthma     Atrial fibrillation     Back pain     Benign colonic polyp     Benign tumor of adrenal gland     Cataract     bilateral    Cholelithiasis     Chronic bronchitis     Chronic bronchitis with COPD (chronic obstructive pulmonary disease)     COPD (chronic obstructive pulmonary disease) 2005    Coronary artery disease     Depression     Diverticulosis Years ago    Elevated cholesterol     Environmental and seasonal allergies     Fibromyalgia     Fibromyalgia, primary Sometime in the 90s    Gastritis     Generalized anxiety disorder     GERD (gastroesophageal reflux disease)     H/O mammogram     Headache Years ago    Hemorrhoids     History of blood transfusion  1985    History of blood transfusion 1985    History of echocardiogram     History of endometriosis     History of nuclear stress test     Hospitalization or health care facility admission within last 6 months     5 times between 11/2022-05/2023    Hypertension     IBS (irritable bowel syndrome)     Impaired functional mobility, balance, gait, and endurance     Impaired mobility     Inverted nipple     Kidney disease     Kidney stone     Liver cyst     Low back pain Years ago    Nodular radiologic density     Nodule of left lung     On home oxygen therapy     2 liters NC QHS    Osteoarthritis     Osteopenia Several years ago    Osteoporosis     PONV (postoperative nausea and vomiting)     Renal cyst     Sinus problem     2014    Sinusitis     Skin cancer     basal cell carcinoma    SOB (shortness of breath)     Tobacco use     Urinary frequency     Urinary tract infection Years ago    Frequent UTIs    Vitamin D deficiency     Wears glasses     Wears partial dentures     upper plate         Past Surgical History: Pertinent history reviewed, as appropriate. Negative, except noted below or in HPI. If this history could not be obtained due to a reason, the reason is listed in the HPI.  Past Surgical History:   Procedure Laterality Date    APPENDECTOMY  1980s    CARDIAC CATHETERIZATION  2003    CATARACT EXTRACTION  2013    both eyes    CHOLECYSTECTOMY  2020    CHOLECYSTECTOMY WITH INTRAOPERATIVE CHOLANGIOGRAM N/A 10/30/2020    Procedure: CHOLECYSTECTOMY LAPAROSCOPIC INTRAOPERATIVE CHOLANGIOGRAPHY;  Surgeon: Sima Moses MD;  Location: [facility] OR;  Service: General;  Laterality: N/A;    COLON SURGERY  2020    COLONOSCOPY  03/11/2013    COLONOSCOPY  06/21/2016    COLONOSCOPY N/A 07/24/2019    Procedure: COLONOSCOPY W/ COLD FORCEP POLYPECTOMIES; HOT SNARE POLYPECTOMIES; COLD SNARE POLYPECTOMY;  Surgeon: Goyo Nunez MD;  Location: [facility] ENDOSCOPY;  Service: Gastroenterology    COLONOSCOPY W/ BIOPSIES AND POLYPECTOMY       EXPLORATORY LAPAROTOMY N/A 2020    Procedure: colectomy, right, closure of enterotomy x 2, reduction of internal volvulus;  Surgeon: Sima Mosse MD;  Location: [facility] OR;  Service: General;  Laterality: N/A;    EXPLORATORY LAPAROTOMY N/A 2023    Procedure: LAPAROTOMY EXPLORATORY WITH LYIS OF ADHESIONS AND  CENTRAL LINE INSERTION;  Surgeon: Bianca Isaac DO;  Location: [facility] OR;  Service: General;  Laterality: N/A;    HYSTERECTOMY  1980s    partial    LYSIS OF ABDOMINAL ADHESIONS      TONSILLECTOMY      UPPER GASTROINTESTINAL ENDOSCOPY  2015    VAGINAL DELIVERY      x2         Family History: Pertinent history reviewed, as appropriate. Negative, except noted below or in HPI. If this history could not be obtained due to a reason, the reason is listed in the HPI.  Family History   Problem Relation Age of Onset    Stomach cancer Brother     Colon cancer Maternal Aunt     Brain cancer Father     Arthritis Father     Hypertension Father     Cancer Father         Father, brother, and aunt    Lung cancer Paternal Grandfather     Heart disease Mother         Mother passed away due to heart disease    Breast cancer Neg Hx     Ovarian cancer Neg Hx          Social History: Pertinent history reviewed, as appropriate. Negative, except noted below or in HPI. If this history could not be obtained due to a reason, the reason is listed in the HPI.  Social History     Socioeconomic History    Marital status:    Tobacco Use    Smoking status: Former     Packs/day: 0.25     Years: 30.00     Pack years: 7.50     Types: Cigarettes     Quit date: 2020     Years since quittin.7     Passive exposure: Past    Smokeless tobacco: Never   Vaping Use    Vaping Use: Never used   Substance and Sexual Activity    Alcohol use: No    Drug use: No    Sexual activity: Not Currently     Birth control/protection: Post-menopausal             Physical Exam:  /66   Pulse 80   Temp 97.8 øF (36.6 øC)  "(Temporal)   Resp 16   Ht 165.1 cm (65\")   Wt 76.4 kg (168 lb 6.9 oz)   SpO2 96%   BMI 28.03 kg/mý     NG tube in place  Right IJ CVP line in place.  Day #2  Right radial arterial line in place.  Day #2    Constitutional:            Vital signs reviewed            Patient is currently on PAP device    Eyes:            Extraocular movement was intact.            Pupils appeared equal            Conjunctiva: Pink    ENT:             Patient was on the PAP device               NG tube in place.    Neck:             Supple. No JVD noted.              Thyroid gland did not seem to be enlarged    Cardiovascular:              S1 + S2. Regular at this time    Lungs/Respiratory:            Transmitted breath sounds bilaterally with significantly diminished air entry             Percussion could not be performed at this time.            Decreased Air Entry Bilaterally with no obvious wheezing heard.    Abdomen/GI:            Postsurgical.  Overall soft.  Bowel sounds difficult to appreciate.  No obvious organomegaly noted.    Musculoskeletal/Extremities:             Gait could not be assessed at this time.              No clubbing in the upper extremities             No clubbing, cyanosis noted in the upper extremities.             No edema noted in the lower extremities bilaterally.    Neurologic:             Did open eyes to loud verbal stimulus.              Did not appear to be able to follow simple commands              Exam was limited since the patient was on PAP device.    Psychiatric:             Exam was limited since the patient was on PAP device.    Skin:             No obvious rash noted.             Warm and dry.      Labs: Reviewed. Pertinent labs were noted.     Results from last 7 days   Lab Units 08/11/23  0359 08/10/23  0342 08/09/23  0701 08/08/23  0523 08/07/23  1535   WBC 10*3/mm3 8.94 6.29 4.05 11.28* 12.90*   HEMOGLOBIN g/dL 9.3* 9.6* 7.8* 8.5* 8.8*   HEMATOCRIT % 34.9 35.6 29.4* 31.8* 33.9* "   PLATELETS 10*3/mm3 174 172 143 160 112*   NEUTROPHIL % %  --   --   --  93.2* 84.5*   NEUTROS ABS 10*3/mm3  --   --   --  10.52* 10.90*   EOSINOPHIL % %  --   --   --  0.1* 0.4   EOS ABS 10*3/mm3  --   --   --  0.01 0.05   LYMPHOCYTE % %  --   --   --  3.1* 9.6*   LYMPHS ABS 10*3/mm3  --   --   --  0.35* 1.24       Lab Results   Component Value Date    PROCALCITO 0.11 08/09/2023    PROCALCITO 0.05 07/20/2023    PROCALCITO 0.07 05/24/2023       No results found for: CRP    No results found for: SEDRATE    Lab Results   Component Value Date    PROBNP 1,694.0 07/30/2023    PROBNP 1,543.0 07/20/2023    PROBNP 1,664.0 05/22/2023       Results from last 7 days   Lab Units 08/11/23  0359 08/10/23  0342 08/09/23  0701 08/08/23  0523 08/07/23  1535   SODIUM mmol/L 151* 151* 146*   < > 145   POTASSIUM mmol/L 3.9 3.8 3.6   < > 4.4   CHLORIDE mmol/L 108* 107 103   < > 102   CO2 mmol/L 38.3* 37.1* 37.9*   < > 34.8*   BUN mg/dL 37* 30* 29*   < > 23   CREATININE mg/dL 0.64 0.72 0.54*   < > 0.65   CALCIUM mg/dL 7.4* 7.7* 8.1*   < > 9.2   ANION GAP mmol/L 4.7* 6.9 5.1   < > 8.2   BILIRUBIN mg/dL 0.2 0.3  --   --  0.3   ALK PHOS U/L 53 45  --   --  59   ALT (SGPT) U/L 24 9  --   --  12   AST (SGOT) U/L 28 14  --   --  19   GLUCOSE mg/dL 139* 155* 115*   < > 142*   TOTAL PROTEIN g/dL 5.7* 5.8*  --   --  6.9   ALBUMIN g/dL 3.4* 3.6  --   --  4.3    < > = values in this interval not displayed.             Lab Results   Component Value Date    TSH 0.633 05/06/2022    TSH 2.120 11/16/2021    TSH 2.910 10/27/2020       No results found for: FREET4    Lab Results   Component Value Date    INR 1.07 11/16/2021    INR 0.97 12/28/2015       Lab Results   Component Value Date    CKTOTAL 126 11/16/2021    CKTOTAL 121 10/27/2020    CKTOTAL 77 11/18/2019       No components found for: HSTROPT    Lab Results   Component Value Date    TROPONINT 22 (H) 07/30/2023    TROPONINT 23 (H) 07/30/2023    TROPONINT 17 (H) 07/20/2023       No results found  for: DDIMER    Brief Urine Lab Results  (Last result in the past 365 days)        Color   Clarity   Blood   Leuk Est   Nitrite   Protein   CREAT   Urine HCG        08/07/23 1817 Yellow   Clear   Negative   Negative   Negative   30 mg/dL (1+)                     Micro: As of August 11, 2023   No results found for: RESPCX  No results found for: BCIDPCR  Lab Results   Component Value Date    BLOODCX No growth at 5 days 05/22/2023    BLOODCX No growth at 5 days 05/22/2023    BLOODCX No growth at 5 days 11/21/2022     Lab Results   Component Value Date    URINECX No growth 06/05/2017     No results found for: MRSACX  Lab Results   Component Value Date    MRSAPCR MRSA Detected (A) 05/23/2023     No results found for: URCX  No components found for: LOWRESPCF  No results found for: THROATCX  No results found for: CULTURES  No components found for: STREPBCX  No results found for: STREPPNEUAG  No results found for: LEGIONELLA  No results found for: MYCOPLASCX  No results found for: GCCX  No results found for: WOUNDCX  No results found for: BODYFLDCX    Lab Results   Component Value Date    FLU Negative 01/22/2018    FLU Negative 01/22/2018       Lab Results   Component Value Date    ADENOVIRUS Not Detected 03/27/2023     Lab Results   Component Value Date    QU761F Not Detected 01/23/2023     Lab Results   Component Value Date    CVHKU1 Not Detected 01/23/2023     Lab Results   Component Value Date    CVNL63 Not Detected 01/23/2023     Lab Results   Component Value Date    CVOC43 Not Detected 01/23/2023     Lab Results   Component Value Date    HUMETPNEVS Not Detected 01/23/2023     Lab Results   Component Value Date    HURVEV Not Detected 01/23/2023     Lab Results   Component Value Date    FLUBPCR Not Detected 07/20/2023     Lab Results   Component Value Date    PARAINFLUE Not Detected 01/23/2023     Lab Results   Component Value Date    PARAFLUV2 Not Detected 01/23/2023     Lab Results   Component Value Date    PARAFLUV3  Not Detected 01/23/2023     Lab Results   Component Value Date    PARAFLUV4 Not Detected 01/23/2023     Lab Results   Component Value Date    BPERTPCR Not Detected 01/23/2023     No results found for: [id number]  Lab Results   Component Value Date    CPNEUPCR Not Detected 01/23/2023     Lab Results   Component Value Date    MPNEUMO Not Detected 01/23/2023     Lab Results   Component Value Date    FLUAPCR Not Detected 07/20/2023     No results found for: FLUAH3  No results found for: FLUAH1  Lab Results   Component Value Date    RSV Not Detected 01/23/2023     Lab Results   Component Value Date    BPARAPCR Not Detected 01/23/2023       COVID 19:  Lab Results   Component Value Date    COVID19 Not Detected 07/20/2023           No results found for: THCURSCR  No results found for: PCPUR  No results found for: COCAINEUR  No results found for: METAMPSCNUR  No results found for: LABOPIASCN  No results found for: AMPHETSCREEN  No results found for: LABBENZSCN  No results found for: TRICYCLICSCN  No results found for: LABMETHSCN  No results found for: BARBITSCNUR  No results found for: OXYCODONESCN  No results found for: PROPOXSCN  No results found for: BUPRENORSCNU  No results found for: ETHANOLMGDL  No results found for: ETOHPCT      ABG: Reviewed  Recent Labs     01/23/23  0050 05/22/23  1843 08/11/23  1215   PHART 7.334* 7.318* 7.282*   RJI1RSY 67.6* 74.3* 88.1*   PO2ART 156.0* 69.2* 133.0*   ZFP6OEU 36.0* 38.1* 41.6*   BASEEXCESS 8.7* 10.6* 12.7*         Lab Results   Component Value Date    LACTATE 1.6 08/10/2023    LACTATE 0.8 08/09/2023    LACTATE 1.0 08/07/2023         Imaging Study: Latest imaging studies was reviewed personally.   Imaging Results (Last 72 Hours)       Procedure Component Value Units Date/Time    CT Head Without Contrast [[id number]] Collected: 08/11/23 0949     Updated: 08/11/23 0952    Narrative:      PROCEDURE: CT HEAD WO CONTRAST-     HISTORY: Mental status change, unknown cause; K56.600-Partial  intestinal  obstruction, unspecified as to cause; I48.91-Unspecified atrial  fibrillation     COMPARISON: None.     TECHNIQUE: Multiple axial CT images were performed from the foramen  magnum to the vertex. Individualized dose reduction techniques using  automated exposure control or adjustment of mA and/or kV according to  the patient size were employed.      FINDINGS: There is moderate, age-appropriate generalized cerebral  atrophy. The ventricles are enlarged, appropriate for atrophy. There is  mild asymmetry of the frontal horns of the lateral ventricles. Mild  small vessel ischemic disease noted. There is no evidence of edema or  hemorrhage.  No masses are identified. No extra-axial fluid is seen. The  paranasal sinuses are unremarkable.       Impression:      Atrophy  without acute process.                    This report was signed and finalized on 8/11/2023 9:49 AM by Angeles Collins MD.       XR Chest 1 View [[id number]] Collected: 08/09/23 1513     Updated: 08/09/23 1520    Narrative:      PROCEDURE: XR CHEST 1 VW-     HISTORY: Line placement; K56.600-Partial intestinal obstruction,  unspecified as to cause; I48.91-Unspecified atrial fibrillation     COMPARISON: None.     FINDINGS: Nasogastric tube seen with the tip coursing below the level  the diaphragm. There is a new right IJ catheter with the tip terminate  in the mid SVC. The patient is rotated to the right. The heart is normal  in size. The mediastinum is unremarkable. There is stable mild  interstitial disease within the lungs. There is no pneumothorax.  There  are no acute osseous abnormalities.       Impression:      Stable mild interstitial disease.     Support tubes and lines as described.              Images were reviewed, interpreted, and dictated by Dr. Angeles Collins MD  Transcribed by Mickey Perez PA-C.     This report was signed and finalized on 8/9/2023 3:17 PM by Angeles Collins MD.       XR Chest 1 View [[id number]] Collected: 08/09/23 100      Updated: 08/09/23 1013    Narrative:      PROCEDURE: XR CHEST 1 VW-     HISTORY: wheezing; K56.600-Partial intestinal obstruction, unspecified  as to cause; I48.91-Unspecified atrial fibrillation     COMPARISON: July 30, 2023.     FINDINGS: The heart is enlarged, stable from the prior exam. NG tube has  been placed since the prior study and extends below the level the  diaphragm below the inferior extent of this image. Mild elevation of the  right hemidiaphragm noted. No acute infiltrate seen.. . The mediastinum  is unremarkable. There is no pneumothorax.  There are no acute osseous  abnormalities.       Impression:      Interval placement of NG tube extending below the level of  the diaphragm otherwise stable chest..                 This report was signed and finalized on 8/9/2023 10:11 AM by Angeles Collins MD.       CT Abdomen Pelvis With Contrast [[id number]] Collected: 08/1947     Updated: 08/08/23 1948    Narrative:      FINAL REPORT    TECHNIQUE:  Routine axial images through the abdomen and pelvis were  obtained following IV contrast administration.    CLINICAL HISTORY:  Small bowel obstruction FOLLOW UP FROM YESTERDAY ORAL CONTRAST  GIVEN VIA NG TUBE 6 HOURS PRIOR.    COMPARISON:  8/7/2023    FINDINGS:  Abdomen: The gallbladder has been removed.  Again seen are  bilateral renal cysts.  There is a 2.9 cm left adrenal nodule,  likely an adenoma in the absence of a known malignancy.  The  other solid abdominal organs and ureters are unremarkable. There  are moderately dilated multiple loops of proximal and mid small  bowel with collapsed distal small bowel consistent with a  high-grade small bowel obstruction.  No convincing passage of  contrast into the collapsed distal small bowel.  There are  postoperative changes of the cecum, possibly an appendectomy.  There is left-sided diverticulosis.  The GI tract is otherwise  unremarkable.  Pelvis: The urinary bladder is unremarkable.  The  uterus and ovaries  are not visualized.  There is no pelvic or  abdominal ascites, adenopathy or acute osseous abnormality.      Impression:      High-grade distal small bowel obstruction.    Authenticated and Electronically Signed by Fidel Knapp M.D. on  08/08/2023 07:47:16 PM            ECHO:  Results for orders placed in visit on 11/02/22    Adult Transthoracic Echo Complete W/ Cont if Necessary Per Protocol    Interpretation Summary  1.  Normal left ventricular size and systolic function, LVEF 60-65%.  2.  Mild concentric LVH.  3.  Normal LV diastolic filling pattern.  4.  Normal right ventricular size and systolic function.  5.  Mildly increased left atrial volume index.  6.  No significant valvular abnormalities.      Results for orders placed during the hospital encounter of 06/15/20    Adult Transthoracic Echo Complete W/ Cont if Necessary Per Protocol    Interpretation Summary  1.  Normal left ventricular size with low normal LV systolic function, LVEF 50-55%.  2.  Mild concentric LVH.  3.  Normal LV diastolic filling pattern.  4.  Mild right ventricular dilation with normal RV systolic function.  5.  Normal left and right atrial size.  6.  Trace MR and TR.        amiodarone, 0.5 mg/min, Last Rate: 0.5 mg/min (08/11/23 0320)  dextrose 5 % and sodium chloride 0.45 % with KCl 20 mEq/L, 125 mL/hr, Last Rate: 125 mL/hr (08/11/23 0843)  Pharmacy to Dose enoxaparin (LOVENOX),             Assessment:  1.  Acute encephalopathy   2.  Acute hypercarbic respiratory Failure.  3.  Severe COPD (last FEV1 of 31% predicted)  4.  Chronic respiratory acidosis  5.  Noncompliance  6.  Heavy ex-smoker  7.  Hypernatremia    Discussion/Recommendations:   If the patient's encephalopathy worsens, I would suggest avoiding benzodiazepines and opiates but will suggest using Precedex instead.    I have asked the nursing staff to use Precedex if needed.    The patient's encephalopathy is likely multifactorial with likely contribution from significant acute  hypercarbic respiratory failure.    She also has hypernatremia and was receiving opiates which could have contributed and causing acute encephalopathy.    There may be a component of critical illness, causing encephalopathy/delirium, as well.    Since the patient continues to have respiratory acidosis despite being on PAP device, she will be started on AVAPS.    ABG will be repeated in the next few hours     We will also repeat ABG in the morning.      Chest X Ray will be ordered as appropriate.     Nebulized treatments have been adjusted.    Given the fact that she has severe COPD I will start the patient on nebulized treatments as they are definitely needed in this patient who underwent abdominal surgery under general anesthesia.    As per my review of the chart, her last PFTs from 2022 confirmed severe COPD with FEV1 of only 31% predicted.    In reviewing previous notes, she also appears to be somewhat noncompliant with her use of NIV device at home.    Her latest ABG confirms worsening chronic respiratory acidosis along with component of acute respiratory acidosis.      If she continues to worsen with her respiratory status, then she may have to be considered for intubation and mechanical ventilation.    In case the patient requires mechanical ventilation, her prognosis will definitely be guarded to poor, given the severity of COPD and chronic respiratory acidosis.    We will have a discussion with the patient regarding the need to be compliant with NIV device at home and will also confirm her compliance with long-term inhalers    The plan was discussed with the nursing staff.    Recommendations were also discussed with the referring provider.     I would like to thank you for the opportunity to participate in the care of this patient.  We will communicate changes and recommendations, if and when necessary.      This document was electronically signed by Herbert Poe MD on 08/11/23 at 13:02  EDT      Dictated utilizing Dragon dictation.

## 2023-08-12 ENCOUNTER — APPOINTMENT (OUTPATIENT)
Dept: GENERAL RADIOLOGY | Facility: HOSPITAL | Age: 78
DRG: 329 | End: 2023-08-12
Payer: MEDICARE

## 2023-08-12 ENCOUNTER — APPOINTMENT (OUTPATIENT)
Dept: CT IMAGING | Facility: HOSPITAL | Age: 78
DRG: 329 | End: 2023-08-12
Payer: MEDICARE

## 2023-08-12 LAB
A-A DO2: 48.8 MMHG
ALBUMIN SERPL-MCNC: 2.9 G/DL (ref 3.5–5.2)
ALBUMIN/GLOB SERPL: 1.4 G/DL
ALP SERPL-CCNC: 42 U/L (ref 39–117)
ALT SERPL W P-5'-P-CCNC: 16 U/L (ref 1–33)
ANION GAP SERPL CALCULATED.3IONS-SCNC: 4.5 MMOL/L (ref 5–15)
ANION GAP SERPL CALCULATED.3IONS-SCNC: 7.8 MMOL/L (ref 5–15)
ARTERIAL PATENCY WRIST A: ABNORMAL
AST SERPL-CCNC: 14 U/L (ref 1–32)
ATMOSPHERIC PRESS: 733 MMHG
BASE EXCESS BLDA CALC-SCNC: 11.7 MMOL/L (ref 0–2)
BDY SITE: ABNORMAL
BILIRUB SERPL-MCNC: 0.2 MG/DL (ref 0–1.2)
BUN SERPL-MCNC: 34 MG/DL (ref 8–23)
BUN SERPL-MCNC: 40 MG/DL (ref 8–23)
BUN/CREAT SERPL: 68 (ref 7–25)
BUN/CREAT SERPL: 69 (ref 7–25)
CALCIUM SPEC-SCNC: 6.6 MG/DL (ref 8.6–10.5)
CALCIUM SPEC-SCNC: 7.1 MG/DL (ref 8.6–10.5)
CHLORIDE SERPL-SCNC: 111 MMOL/L (ref 98–107)
CHLORIDE SERPL-SCNC: 112 MMOL/L (ref 98–107)
CO2 SERPL-SCNC: 28.2 MMOL/L (ref 22–29)
CO2 SERPL-SCNC: 35.5 MMOL/L (ref 22–29)
COHGB MFR BLD: 1.6 % (ref 0–2)
CREAT SERPL-MCNC: 0.5 MG/DL (ref 0.57–1)
CREAT SERPL-MCNC: 0.58 MG/DL (ref 0.57–1)
DEPRECATED RDW RBC AUTO: 60.7 FL (ref 37–54)
EGFRCR SERPLBLD CKD-EPI 2021: 93.9 ML/MIN/1.73
EGFRCR SERPLBLD CKD-EPI 2021: 97.3 ML/MIN/1.73
ERYTHROCYTE [DISTWIDTH] IN BLOOD BY AUTOMATED COUNT: 19.4 % (ref 12.3–15.4)
GLOBULIN UR ELPH-MCNC: 2.1 GM/DL
GLUCOSE SERPL-MCNC: 145 MG/DL (ref 65–99)
GLUCOSE SERPL-MCNC: 159 MG/DL (ref 65–99)
HCO3 BLDA-SCNC: 37.1 MMOL/L (ref 22–28)
HCT VFR BLD AUTO: 27.7 % (ref 34–46.6)
HCT VFR BLD AUTO: 32.3 % (ref 34–46.6)
HCT VFR BLD CALC: 22.8 %
HGB BLD-MCNC: 7.3 G/DL (ref 12–15.9)
HGB BLD-MCNC: 9.2 G/DL (ref 12–15.9)
INHALED O2 CONCENTRATION: 35 %
MAGNESIUM SERPL-MCNC: 2.3 MG/DL (ref 1.6–2.4)
MCH RBC QN AUTO: 22.7 PG (ref 26.6–33)
MCHC RBC AUTO-ENTMCNC: 26.4 G/DL (ref 31.5–35.7)
MCV RBC AUTO: 86.3 FL (ref 79–97)
METHGB BLD QL: 0.8 % (ref 0–1.5)
MODALITY: ABNORMAL
NOTE: ABNORMAL
OXYHGB MFR BLDV: 97.3 % (ref 94–99)
PCO2 BLDA: 54.5 MM HG (ref 35–45)
PCO2 TEMP ADJ BLD: ABNORMAL MM[HG]
PH BLDA: 7.44 PH UNITS (ref 7.35–7.45)
PH, TEMP CORRECTED: ABNORMAL
PHOSPHATE SERPL-MCNC: 0.3 MG/DL (ref 2.5–4.5)
PHOSPHATE SERPL-MCNC: 1.5 MG/DL (ref 2.5–4.5)
PLATELET # BLD AUTO: 115 10*3/MM3 (ref 140–450)
PMV BLD AUTO: 12.1 FL (ref 6–12)
PO2 BLDA: 131 MM HG (ref 75–100)
PO2 TEMP ADJ BLD: ABNORMAL MM[HG]
POTASSIUM SERPL-SCNC: 3.9 MMOL/L (ref 3.5–5.2)
POTASSIUM SERPL-SCNC: 4.3 MMOL/L (ref 3.5–5.2)
POTASSIUM SERPL-SCNC: 4.3 MMOL/L (ref 3.5–5.2)
PROT SERPL-MCNC: 5 G/DL (ref 6–8.5)
RBC # BLD AUTO: 3.21 10*6/MM3 (ref 3.77–5.28)
SAO2 % BLDCOA: 99.7 % (ref 94–100)
SODIUM SERPL-SCNC: 148 MMOL/L (ref 136–145)
SODIUM SERPL-SCNC: 151 MMOL/L (ref 136–145)
VENTILATOR MODE: ABNORMAL
WBC NRBC COR # BLD: 3.96 10*3/MM3 (ref 3.4–10.8)

## 2023-08-12 PROCEDURE — 25010000002 KETOROLAC TROMETHAMINE PER 15 MG: Performed by: INTERNAL MEDICINE

## 2023-08-12 PROCEDURE — 25010000002 LORAZEPAM PER 2 MG: Performed by: INTERNAL MEDICINE

## 2023-08-12 PROCEDURE — 94799 UNLISTED PULMONARY SVC/PX: CPT

## 2023-08-12 PROCEDURE — 99024 POSTOP FOLLOW-UP VISIT: CPT | Performed by: SURGERY

## 2023-08-12 PROCEDURE — 25010000002 FUROSEMIDE PER 20 MG

## 2023-08-12 PROCEDURE — 25010000002 DIPHENHYDRAMINE PER 50 MG

## 2023-08-12 PROCEDURE — 85018 HEMOGLOBIN: CPT | Performed by: INTERNAL MEDICINE

## 2023-08-12 PROCEDURE — 25010000002 VANCOMYCIN 5 G RECONSTITUTED SOLUTION: Performed by: INTERNAL MEDICINE

## 2023-08-12 PROCEDURE — 84132 ASSAY OF SERUM POTASSIUM: CPT | Performed by: INTERNAL MEDICINE

## 2023-08-12 PROCEDURE — 99232 SBSQ HOSP IP/OBS MODERATE 35: CPT | Performed by: PSYCHIATRY & NEUROLOGY

## 2023-08-12 PROCEDURE — 25010000002 PIPERACILLIN SOD-TAZOBACTAM PER 1 G: Performed by: INTERNAL MEDICINE

## 2023-08-12 PROCEDURE — 83050 HGB METHEMOGLOBIN QUAN: CPT

## 2023-08-12 PROCEDURE — 84100 ASSAY OF PHOSPHORUS: CPT | Performed by: INTERNAL MEDICINE

## 2023-08-12 PROCEDURE — 25010000002 METHYLPREDNISOLONE PER 125 MG

## 2023-08-12 PROCEDURE — 82805 BLOOD GASES W/O2 SATURATION: CPT

## 2023-08-12 PROCEDURE — 25010000002 HALOPERIDOL LACTATE PER 5 MG: Performed by: INTERNAL MEDICINE

## 2023-08-12 PROCEDURE — 25010000002 CALCIUM GLUCONATE-NACL 1-0.675 GM/50ML-% SOLUTION: Performed by: INTERNAL MEDICINE

## 2023-08-12 PROCEDURE — 94660 CPAP INITIATION&MGMT: CPT

## 2023-08-12 PROCEDURE — 86900 BLOOD TYPING SEROLOGIC ABO: CPT

## 2023-08-12 PROCEDURE — 25010000002 HYDRALAZINE PER 20 MG: Performed by: INTERNAL MEDICINE

## 2023-08-12 PROCEDURE — 85027 COMPLETE CBC AUTOMATED: CPT | Performed by: INTERNAL MEDICINE

## 2023-08-12 PROCEDURE — 36430 TRANSFUSION BLD/BLD COMPNT: CPT

## 2023-08-12 PROCEDURE — 25010000002 CHLOROTHIAZIDE PER 500 MG: Performed by: INTERNAL MEDICINE

## 2023-08-12 PROCEDURE — 82375 ASSAY CARBOXYHB QUANT: CPT

## 2023-08-12 PROCEDURE — P9016 RBC LEUKOCYTES REDUCED: HCPCS

## 2023-08-12 PROCEDURE — 80053 COMPREHEN METABOLIC PANEL: CPT | Performed by: INTERNAL MEDICINE

## 2023-08-12 PROCEDURE — 25010000002 NITROGLYCERIN 200 MCG/ML SOLUTION

## 2023-08-12 PROCEDURE — 25010000002 AMIODARONE IN DEXTROSE 5% 360-4.14 MG/200ML-% SOLUTION: Performed by: INTERNAL MEDICINE

## 2023-08-12 PROCEDURE — 25010000002 HALOPERIDOL LACTATE PER 5 MG

## 2023-08-12 PROCEDURE — 83735 ASSAY OF MAGNESIUM: CPT | Performed by: INTERNAL MEDICINE

## 2023-08-12 PROCEDURE — 25010000002 HYDROMORPHONE 1 MG/ML SOLUTION: Performed by: INTERNAL MEDICINE

## 2023-08-12 PROCEDURE — 94761 N-INVAS EAR/PLS OXIMETRY MLT: CPT

## 2023-08-12 PROCEDURE — 99233 SBSQ HOSP IP/OBS HIGH 50: CPT | Performed by: INTERNAL MEDICINE

## 2023-08-12 PROCEDURE — 71045 X-RAY EXAM CHEST 1 VIEW: CPT

## 2023-08-12 PROCEDURE — 85014 HEMATOCRIT: CPT | Performed by: INTERNAL MEDICINE

## 2023-08-12 PROCEDURE — 25010000002 CALCIUM GLUCONATE 2-0.675 GM/100ML-% SOLUTION: Performed by: INTERNAL MEDICINE

## 2023-08-12 PROCEDURE — 25010000002 ENOXAPARIN PER 10 MG: Performed by: INTERNAL MEDICINE

## 2023-08-12 RX ORDER — METHYLPREDNISOLONE SODIUM SUCCINATE 125 MG/2ML
INJECTION, POWDER, LYOPHILIZED, FOR SOLUTION INTRAMUSCULAR; INTRAVENOUS
Status: COMPLETED
Start: 2023-08-12 | End: 2023-08-13

## 2023-08-12 RX ORDER — HALOPERIDOL 5 MG/ML
5 INJECTION INTRAMUSCULAR EVERY 6 HOURS PRN
Status: DISCONTINUED | OUTPATIENT
Start: 2023-08-12 | End: 2023-08-13

## 2023-08-12 RX ORDER — CHLOROTHIAZIDE SODIUM 500 MG/1
500 INJECTION INTRAVENOUS ONCE
Status: COMPLETED | OUTPATIENT
Start: 2023-08-12 | End: 2023-08-12

## 2023-08-12 RX ORDER — IPRATROPIUM BROMIDE AND ALBUTEROL SULFATE 2.5; .5 MG/3ML; MG/3ML
3 SOLUTION RESPIRATORY (INHALATION)
Status: DISCONTINUED | OUTPATIENT
Start: 2023-08-12 | End: 2023-08-17

## 2023-08-12 RX ORDER — FUROSEMIDE 10 MG/ML
INJECTION INTRAMUSCULAR; INTRAVENOUS
Status: COMPLETED
Start: 2023-08-12 | End: 2023-08-12

## 2023-08-12 RX ORDER — CALCIUM GLUCONATE 20 MG/ML
1000 INJECTION, SOLUTION INTRAVENOUS
Status: COMPLETED | OUTPATIENT
Start: 2023-08-12 | End: 2023-08-12

## 2023-08-12 RX ORDER — NITROGLYCERIN 20 MG/100ML
INJECTION INTRAVENOUS
Status: COMPLETED
Start: 2023-08-12 | End: 2023-08-12

## 2023-08-12 RX ORDER — HALOPERIDOL 5 MG/ML
INJECTION INTRAMUSCULAR
Status: COMPLETED
Start: 2023-08-12 | End: 2023-08-12

## 2023-08-12 RX ORDER — VANCOMYCIN/0.9 % SOD CHLORIDE 1.5G/250ML
20 PLASTIC BAG, INJECTION (ML) INTRAVENOUS ONCE
Status: COMPLETED | OUTPATIENT
Start: 2023-08-12 | End: 2023-08-12

## 2023-08-12 RX ORDER — HALOPERIDOL 5 MG/ML
2 INJECTION INTRAMUSCULAR EVERY 6 HOURS PRN
Status: DISCONTINUED | OUTPATIENT
Start: 2023-08-12 | End: 2023-08-12

## 2023-08-12 RX ORDER — CALCIUM GLUCONATE 20 MG/ML
2000 INJECTION, SOLUTION INTRAVENOUS
Status: COMPLETED | OUTPATIENT
Start: 2023-08-12 | End: 2023-08-13

## 2023-08-12 RX ORDER — METHYLPREDNISOLONE SODIUM SUCCINATE 40 MG/ML
40 INJECTION, POWDER, LYOPHILIZED, FOR SOLUTION INTRAMUSCULAR; INTRAVENOUS EVERY 8 HOURS
Status: DISCONTINUED | OUTPATIENT
Start: 2023-08-12 | End: 2023-08-13

## 2023-08-12 RX ORDER — DIPHENHYDRAMINE HYDROCHLORIDE 50 MG/ML
50 INJECTION INTRAMUSCULAR; INTRAVENOUS ONCE
Status: COMPLETED | OUTPATIENT
Start: 2023-08-12 | End: 2023-08-12

## 2023-08-12 RX ORDER — FUROSEMIDE 10 MG/ML
40 INJECTION INTRAMUSCULAR; INTRAVENOUS
Status: DISCONTINUED | OUTPATIENT
Start: 2023-08-12 | End: 2023-08-12

## 2023-08-12 RX ORDER — DIPHENHYDRAMINE HYDROCHLORIDE 50 MG/ML
INJECTION INTRAMUSCULAR; INTRAVENOUS
Status: COMPLETED
Start: 2023-08-12 | End: 2023-08-12

## 2023-08-12 RX ADMIN — CALCIUM GLUCONATE 2000 MG: 20 INJECTION, SOLUTION INTRAVENOUS at 21:10

## 2023-08-12 RX ADMIN — CHLOROTHIAZIDE SODIUM 500 MG: 500 INJECTION, POWDER, LYOPHILIZED, FOR SOLUTION INTRAVENOUS at 18:12

## 2023-08-12 RX ADMIN — CALCIUM GLUCONATE 2000 MG: 20 INJECTION, SOLUTION INTRAVENOUS at 23:11

## 2023-08-12 RX ADMIN — CALCIUM GLUCONATE 2000 MG: 20 INJECTION, SOLUTION INTRAVENOUS at 19:14

## 2023-08-12 RX ADMIN — HALOPERIDOL 2 MG: 5 INJECTION INTRAMUSCULAR at 11:32

## 2023-08-12 RX ADMIN — KETOROLAC TROMETHAMINE 15 MG: 30 INJECTION, SOLUTION INTRAMUSCULAR; INTRAVENOUS at 00:10

## 2023-08-12 RX ADMIN — DIPHENHYDRAMINE HYDROCHLORIDE 50 MG: 50 INJECTION INTRAMUSCULAR; INTRAVENOUS at 13:15

## 2023-08-12 RX ADMIN — METHYLPREDNISOLONE SODIUM SUCCINATE 125 MG: 125 INJECTION, POWDER, LYOPHILIZED, FOR SOLUTION INTRAMUSCULAR; INTRAVENOUS at 17:18

## 2023-08-12 RX ADMIN — AMIODARONE HYDROCHLORIDE 0.5 MG/MIN: 1.8 INJECTION, SOLUTION INTRAVENOUS at 05:08

## 2023-08-12 RX ADMIN — HYDROMORPHONE HYDROCHLORIDE 1 MG: 1 INJECTION, SOLUTION INTRAMUSCULAR; INTRAVENOUS; SUBCUTANEOUS at 09:54

## 2023-08-12 RX ADMIN — VANCOMYCIN HYDROCHLORIDE 1500 MG: 5 INJECTION, POWDER, LYOPHILIZED, FOR SOLUTION INTRAVENOUS at 18:38

## 2023-08-12 RX ADMIN — HYDROMORPHONE HYDROCHLORIDE 1 MG: 1 INJECTION, SOLUTION INTRAMUSCULAR; INTRAVENOUS; SUBCUTANEOUS at 20:09

## 2023-08-12 RX ADMIN — TIOTROPIUM BROMIDE INHALATION SPRAY 2 PUFF: 3.12 SPRAY, METERED RESPIRATORY (INHALATION) at 08:52

## 2023-08-12 RX ADMIN — DIPHENHYDRAMINE HYDROCHLORIDE: 50 INJECTION, SOLUTION INTRAMUSCULAR; INTRAVENOUS at 15:14

## 2023-08-12 RX ADMIN — Medication 10 ML: at 08:51

## 2023-08-12 RX ADMIN — KETOROLAC TROMETHAMINE 15 MG: 30 INJECTION, SOLUTION INTRAMUSCULAR; INTRAVENOUS at 05:55

## 2023-08-12 RX ADMIN — DIPHENHYDRAMINE HYDROCHLORIDE 50 MG: 50 INJECTION, SOLUTION INTRAMUSCULAR; INTRAVENOUS at 13:15

## 2023-08-12 RX ADMIN — FUROSEMIDE 40 MG: 10 INJECTION INTRAMUSCULAR; INTRAVENOUS at 17:18

## 2023-08-12 RX ADMIN — IPRATROPIUM BROMIDE AND ALBUTEROL SULFATE 3 ML: 2.5; .5 SOLUTION RESPIRATORY (INHALATION) at 18:14

## 2023-08-12 RX ADMIN — DEXMEDETOMIDINE HYDROCHLORIDE 1.5 MCG/KG/HR: 400 INJECTION INTRAVENOUS at 02:24

## 2023-08-12 RX ADMIN — DEXMEDETOMIDINE HYDROCHLORIDE 1 MCG/KG/HR: 400 INJECTION INTRAVENOUS at 15:13

## 2023-08-12 RX ADMIN — NITROGLYCERIN 50000 MCG: 20 INJECTION INTRAVENOUS at 17:23

## 2023-08-12 RX ADMIN — POTASSIUM CHLORIDE, DEXTROSE MONOHYDRATE AND SODIUM CHLORIDE 175 ML/HR: 150; 5; 450 INJECTION, SOLUTION INTRAVENOUS at 10:59

## 2023-08-12 RX ADMIN — HYDROMORPHONE HYDROCHLORIDE 1 MG: 1 INJECTION, SOLUTION INTRAMUSCULAR; INTRAVENOUS; SUBCUTANEOUS at 01:24

## 2023-08-12 RX ADMIN — ENOXAPARIN SODIUM 70 MG: 100 INJECTION SUBCUTANEOUS at 08:52

## 2023-08-12 RX ADMIN — METOPROLOL TARTRATE 5 MG: 1 INJECTION, SOLUTION INTRAVENOUS at 08:51

## 2023-08-12 RX ADMIN — SODIUM PHOSPHATE, MONOBASIC, MONOHYDRATE AND SODIUM PHOSPHATE, DIBASIC, ANHYDROUS 15 MMOL: 276; 142 INJECTION, SOLUTION INTRAVENOUS at 17:46

## 2023-08-12 RX ADMIN — AMIODARONE HYDROCHLORIDE 0.5 MG/MIN: 1.8 INJECTION, SOLUTION INTRAVENOUS at 15:19

## 2023-08-12 RX ADMIN — FUROSEMIDE 40 MG: 10 INJECTION, SOLUTION INTRAMUSCULAR; INTRAVENOUS at 17:18

## 2023-08-12 RX ADMIN — Medication 10 ML: at 20:10

## 2023-08-12 RX ADMIN — BUDESONIDE 1 MG: 0.5 INHALANT RESPIRATORY (INHALATION) at 07:03

## 2023-08-12 RX ADMIN — HYDROMORPHONE HYDROCHLORIDE 1 MG: 1 INJECTION, SOLUTION INTRAMUSCULAR; INTRAVENOUS; SUBCUTANEOUS at 15:47

## 2023-08-12 RX ADMIN — HYDROMORPHONE HYDROCHLORIDE 1 MG: 1 INJECTION, SOLUTION INTRAMUSCULAR; INTRAVENOUS; SUBCUTANEOUS at 22:35

## 2023-08-12 RX ADMIN — METOPROLOL TARTRATE 5 MG: 1 INJECTION, SOLUTION INTRAVENOUS at 20:26

## 2023-08-12 RX ADMIN — DEXMEDETOMIDINE HYDROCHLORIDE 1.5 MCG/KG/HR: 400 INJECTION INTRAVENOUS at 23:53

## 2023-08-12 RX ADMIN — SODIUM PHOSPHATE, MONOBASIC, MONOHYDRATE AND SODIUM PHOSPHATE, DIBASIC, ANHYDROUS 15 MMOL: 276; 142 INJECTION, SOLUTION INTRAVENOUS at 10:36

## 2023-08-12 RX ADMIN — IPRATROPIUM BROMIDE AND ALBUTEROL SULFATE 3 ML: 2.5; .5 SOLUTION RESPIRATORY (INHALATION) at 07:03

## 2023-08-12 RX ADMIN — METOPROLOL TARTRATE 5 MG: 1 INJECTION, SOLUTION INTRAVENOUS at 15:19

## 2023-08-12 RX ADMIN — Medication 2 SPRAY: at 07:47

## 2023-08-12 RX ADMIN — POTASSIUM CHLORIDE, DEXTROSE MONOHYDRATE AND SODIUM CHLORIDE 125 ML/HR: 150; 5; 450 INJECTION, SOLUTION INTRAVENOUS at 03:36

## 2023-08-12 RX ADMIN — HALOPERIDOL LACTATE 2 MG: 5 INJECTION, SOLUTION INTRAMUSCULAR at 11:32

## 2023-08-12 RX ADMIN — SODIUM PHOSPHATE, MONOBASIC, MONOHYDRATE AND SODIUM PHOSPHATE, DIBASIC, ANHYDROUS 15 MMOL: 276; 142 INJECTION, SOLUTION INTRAVENOUS at 09:00

## 2023-08-12 RX ADMIN — HYDRALAZINE HYDROCHLORIDE 10 MG: 20 INJECTION, SOLUTION INTRAMUSCULAR; INTRAVENOUS at 23:11

## 2023-08-12 RX ADMIN — DEXMEDETOMIDINE HYDROCHLORIDE 1 MCG/KG/HR: 400 INJECTION INTRAVENOUS at 07:15

## 2023-08-12 RX ADMIN — ARFORMOTEROL TARTRATE 15 MCG: 15 SOLUTION RESPIRATORY (INHALATION) at 07:03

## 2023-08-12 RX ADMIN — DEXMEDETOMIDINE HYDROCHLORIDE 1.5 MCG/KG/HR: 400 INJECTION INTRAVENOUS at 10:59

## 2023-08-12 RX ADMIN — IPRATROPIUM BROMIDE AND ALBUTEROL SULFATE 3 ML: 2.5; .5 SOLUTION RESPIRATORY (INHALATION) at 12:10

## 2023-08-12 RX ADMIN — KETOROLAC TROMETHAMINE 15 MG: 30 INJECTION, SOLUTION INTRAMUSCULAR; INTRAVENOUS at 18:28

## 2023-08-12 RX ADMIN — HALOPERIDOL LACTATE 5 MG: 5 INJECTION, SOLUTION INTRAMUSCULAR at 12:32

## 2023-08-12 RX ADMIN — Medication 10 ML: at 20:09

## 2023-08-12 RX ADMIN — Medication 5 MG: at 20:09

## 2023-08-12 RX ADMIN — CALCIUM GLUCONATE 1000 MG: 20 INJECTION, SOLUTION INTRAVENOUS at 18:17

## 2023-08-12 RX ADMIN — HYDRALAZINE HYDROCHLORIDE 10 MG: 20 INJECTION, SOLUTION INTRAMUSCULAR; INTRAVENOUS at 11:25

## 2023-08-12 RX ADMIN — ENOXAPARIN SODIUM 70 MG: 100 INJECTION SUBCUTANEOUS at 20:09

## 2023-08-12 RX ADMIN — IPRATROPIUM BROMIDE AND ALBUTEROL SULFATE 1.5 ML: .5; 3 SOLUTION RESPIRATORY (INHALATION) at 16:59

## 2023-08-12 RX ADMIN — METOPROLOL TARTRATE 5 MG: 1 INJECTION, SOLUTION INTRAVENOUS at 03:35

## 2023-08-12 RX ADMIN — PIPERACILLIN SODIUM AND TAZOBACTAM SODIUM 3.38 G: 3; .375 INJECTION, SOLUTION INTRAVENOUS at 23:53

## 2023-08-12 RX ADMIN — HYDRALAZINE HYDROCHLORIDE 10 MG: 20 INJECTION, SOLUTION INTRAMUSCULAR; INTRAVENOUS at 16:41

## 2023-08-12 RX ADMIN — SODIUM PHOSPHATE, MONOBASIC, MONOHYDRATE AND SODIUM PHOSPHATE, DIBASIC, ANHYDROUS 15 MMOL: 276; 142 INJECTION, SOLUTION INTRAVENOUS at 07:39

## 2023-08-12 RX ADMIN — LORAZEPAM 1 MG: 2 INJECTION INTRAMUSCULAR; INTRAVENOUS at 11:47

## 2023-08-12 RX ADMIN — KETOROLAC TROMETHAMINE 15 MG: 30 INJECTION, SOLUTION INTRAMUSCULAR; INTRAVENOUS at 12:04

## 2023-08-12 RX ADMIN — Medication 10 ML: at 08:52

## 2023-08-12 RX ADMIN — DEXMEDETOMIDINE HYDROCHLORIDE 1 MCG/KG/HR: 400 INJECTION INTRAVENOUS at 18:12

## 2023-08-12 RX ADMIN — BUDESONIDE 1 MG: 0.5 INHALANT RESPIRATORY (INHALATION) at 18:14

## 2023-08-12 RX ADMIN — ARFORMOTEROL TARTRATE 15 MCG: 15 SOLUTION RESPIRATORY (INHALATION) at 18:14

## 2023-08-12 RX ADMIN — HYDROMORPHONE HYDROCHLORIDE 1 MG: 1 INJECTION, SOLUTION INTRAMUSCULAR; INTRAVENOUS; SUBCUTANEOUS at 03:35

## 2023-08-12 RX ADMIN — LORAZEPAM 1 MG: 2 INJECTION INTRAMUSCULAR; INTRAVENOUS at 23:41

## 2023-08-12 RX ADMIN — TAZOBACTAM SODIUM AND PIPERACILLIN SODIUM 3.38 G: 375; 3 INJECTION, SOLUTION INTRAVENOUS at 17:45

## 2023-08-12 RX ADMIN — SODIUM PHOSPHATE, MONOBASIC, MONOHYDRATE AND SODIUM PHOSPHATE, DIBASIC, ANHYDROUS 15 MMOL: 276; 142 INJECTION, SOLUTION INTRAVENOUS at 19:48

## 2023-08-12 RX ADMIN — LORAZEPAM 1 MG: 2 INJECTION INTRAMUSCULAR; INTRAVENOUS at 05:55

## 2023-08-12 NOTE — PROGRESS NOTES
Adult Nutrition  Assessment/PES    Patient Name:  Gabi Dudley  YOB: 1945  MRN: [id number]  Admit Date:  8/7/2023    Assessment Date:  8/12/2023    Comments:     Pt NPO day 5 w/ NG-tube - spoke w/ pt's nurse and there is currently no plan to advance diet since pt still has a decent amount of drainage. Phosphorus dangerously low but is receiving replacement IV. Recommend pt diet be advanced or PN be started when medically appropriate. RD to follow-up and available PRN.      Reason for Assessment       Row Name 08/12/23 1038          Reason for Assessment    Reason For Assessment identified at risk by screening criteria;per organizational policy;follow-up protocol     Diagnosis gastrointestinal disease     Identified At Risk by Screening Criteria difficulty chewing/swallowing;MST SCORE 2+                      Labs/Tests/Procedures/Meds       Row Name 08/12/23 1038          Labs/Procedures/Meds    Lab Results Reviewed reviewed, pertinent     Lab Results Comments High: Na, BUN  Low: phosphorus        Medications    Pertinent Medications Reviewed reviewed, pertinent                    Physical Findings       Row Name 08/12/23 1046          Physical Findings    Overall Physical Appearance normal wt for age, midline abdomen incision, NG-tube                    Estimated/Assessed Needs - Anthropometrics       Row Name 08/12/23 0300          Anthropometrics    Weight 75.7 kg (166 lb 14.2 oz)                    Nutrition Prescription Ordered       Row Name 08/12/23 1048          Nutrition Prescription PO    Current PO Diet NPO                    Evaluation of Received Nutrient/Fluid Intake       Row Name 08/12/23 1048          Intake Assessment    Energy/Calorie Requirement Assessment not meeting needs     Protein Requirement Assessment not meeting needs        PO Evaluation    Number of Days PO Intake Evaluated Insufficient Data                       Problem/Interventions:   Problem 1       Row Name  08/12/23 1048          Nutrition Diagnoses Problem 1    Problem 1 Altered GI Function     Etiology (related to) Medical Diagnosis     Gastrointestinal Constipation;SBO     Signs/Symptoms (evidenced by) NPO                          Intervention Goal       Row Name 08/12/23 1049          Intervention Goal    General Maintain nutrition;Improved nutrition related lab(s);Reduce/improve symptoms;Meet nutritional needs for age/condition;Disease management/therapy     PO Establish PO;Meet estimated needs     Weight Maintain weight                    Nutrition Intervention       Row Name 08/12/23 1053          Nutrition Intervention    RD/Tech Action Follow Tx progress;Care plan reviewd;Await begin PO                    Nutrition Prescription       Row Name 08/12/23 1053          Nutrition Prescription PO    New PO Prescription Ordered? No, recommended        Other Orders    Obtain Weight Daily     Obtain Weight Ordered? No, recommended     Supplement Vitamin mineral supplement     Supplement Ordered? No, recommended     Labs Mg++;Na+;K+;Phos     Labs Ordered? No, recommended                    Education/Evaluation       Row Name 08/12/23 1056          Education    Education Will Instruct as appropriate        Monitor/Evaluation    Monitor Per protocol;PO intake;Supplement intake;Pertinent labs;Skin status;Weight;Symptoms                     Electronically signed by:  Sergey Arredondo RD  08/12/23 10:56 EDT

## 2023-08-12 NOTE — THERAPY TREATMENT NOTE
Hold per nsg d/t pt with increased confusion and fatigue noted today. Physical therapy to f/u at later date

## 2023-08-12 NOTE — PLAN OF CARE
Goal Outcome Evaluation:  Plan of Care Reviewed With: patient           Outcome Evaluation: Patient with intermittent confusion and agitation throughout shift. She wore bipap until ~ 0400. Now on 3LNC with O2 >90%. Precedex titrated up and down throughout shift in congruence with agitation. PRN's administered. Patient c/o unrelieved pain no matter what medication is given. Patient is easily redirected but very forgetful. No other complaints at this time.

## 2023-08-12 NOTE — PROGRESS NOTES
LOS: 3 days   Patient Care Team:  Paul Quinteros MD as PCP - General (Internal Medicine)  Sima Moses MD as Consulting Physician (General Surgery)  Taiwo Curry MD as Consulting Physician (Cardiology)  Soledad Stauffer, EFRAÍN as Ambulatory  ([facility])           HPI: Patient currently not arousable having recently received medications for agitation.    ROS:    Vital Signs  Temp:  [97 øF (36.1 øC)-98.6 øF (37 øC)] 97 øF (36.1 øC)  Heart Rate:  [] 63  Resp:  [13-35] 17  BP: (136-171)/(60-77) 171/77  Arterial Line BP: (140-167)/(65-77) 167/77    Physical Exam:     General Appearance:  Currently sleeping and sedated   GI/Abdomen:   Soft with mild distention.  No significant tenderness noted.  Wound is without evidence of infection.  Bowel sounds present     Results Review:       Lab Results (last 24 hours)       Procedure Component Value Units Date/Time    Phosphorus [[id number]]  (Abnormal) Collected: 08/12/23 1541    Specimen: Blood, Arterial Line Updated: 08/12/23 1627     Phosphorus 1.5 mg/dL     Hemoglobin & Hematocrit, Blood [[id number]]  (Abnormal) Collected: 08/12/23 1541    Specimen: Blood, Arterial Line Updated: 08/12/23 1616     Hemoglobin 9.2 g/dL      Hematocrit 32.3 %     Blood Gas, Arterial With Co-Ox [[id number]]  (Abnormal) Collected: 08/12/23 0717    Specimen: Arterial Blood Updated: 08/12/23 0717     Site Arterial Line     Glenn's Test N/A     pH, Arterial 7.441 pH units      pCO2, Arterial 54.5 mm Hg      Comment: 83 Value above reference range        pO2, Arterial 131.0 mm Hg      Comment: 83 Value above reference range        HCO3, Arterial 37.1 mmol/L      Comment: 83 Value above reference range        Base Excess, Arterial 11.7 mmol/L      Comment: 83 Value above reference range        O2 Saturation, Arterial 99.7 %      Comment: 83 Value above reference range        Hematocrit, Blood Gas 22.8 %      Comment: 84 Value below reference range        Oxyhemoglobin  97.3 %      Methemoglobin 0.80 %      Carboxyhemoglobin 1.6 %      A-a DO2 48.8 mmHg      Barometric Pressure for Blood Gas 733 mmHg      Modality BiPap     FIO2 35 %      Ventilator Mode NA     Comment: Meter: [id number]     :  [id number]        Note --     pH, Temp Corrected --     pCO2, Temperature Corrected --     pO2, Temperature Corrected --    Phosphorus [[id number]]  (Abnormal) Collected: 08/12/23 0412    Specimen: Blood Updated: 08/12/23 0643     Phosphorus 0.3 mg/dL     Magnesium [[id number]]  (Normal) Collected: 08/12/23 0412    Specimen: Blood Updated: 08/12/23 0629     Magnesium 2.3 mg/dL     Comprehensive Metabolic Panel [[id number]]  (Abnormal) Collected: 08/12/23 0412    Specimen: Blood Updated: 08/12/23 0629     Glucose 145 mg/dL      BUN 40 mg/dL      Creatinine 0.58 mg/dL      Sodium 151 mmol/L      Potassium 4.3 mmol/L      Chloride 111 mmol/L      CO2 35.5 mmol/L      Calcium 7.1 mg/dL      Total Protein 5.0 g/dL      Albumin 2.9 g/dL      ALT (SGPT) 16 U/L      AST (SGOT) 14 U/L      Alkaline Phosphatase 42 U/L      Total Bilirubin 0.2 mg/dL      Globulin 2.1 gm/dL      A/G Ratio 1.4 g/dL      BUN/Creatinine Ratio 69.0     Anion Gap 4.5 mmol/L      eGFR 93.9 mL/min/1.73     Narrative:      GFR Normal >60  Chronic Kidney Disease <60  Kidney Failure <15    The GFR formula is only valid for adults with stable renal function between ages 18 and 70.    Potassium [[id number]]  (Normal) Collected: 08/12/23 0412    Specimen: Blood Updated: 08/12/23 0629     Potassium 4.3 mmol/L     CBC (No Diff) [[id number]]  (Abnormal) Collected: 08/12/23 0412    Specimen: Blood Updated: 08/12/23 0556     WBC 3.96 10*3/mm3      RBC 3.21 10*6/mm3      Hemoglobin 7.3 g/dL      Hematocrit 27.7 %      MCV 86.3 fL      MCH 22.7 pg      MCHC 26.4 g/dL      RDW 19.4 %      RDW-SD 60.7 fl      MPV 12.1 fL      Platelets 115 10*3/mm3     Osmolality, Serum [[id number]]  (Abnormal) Collected: 08/11/23 0809    Specimen:  Blood Updated: 08/11/23 2017     Osmolality 326 mOsm/kg     Osmolality, Urine - Urine, Clean Catch [[id number]] Collected: 08/11/23 0847    Specimen: Urine, Clean Catch Updated: 08/11/23 1956     Osmolality, Urine 401 mOsm/kg     Narrative:      Osmo Normal Reference Ranges:    Random:  mOsm/kg H2O, depending on fluid intake.  Random: >850 mOsm/kg H20, after 12 hour fluid restriction.    24 Hour: 300-900 mOsm/kg H2O.    Creatinine Urine Random (kidney function) GFR component - Urine, Clean Catch [[id number]] Collected: 08/11/23 0847    Specimen: Urine, Clean Catch Updated: 08/11/23 1901     Creatinine, Urine 105.2 mg/dL     Narrative:      Reference intervals for random urine have not been established.  Clinical usage is dependent upon physician's interpretation in combination with other laboratory tests.                   Assessment & Plan       Partial small bowel obstruction    Postop day #3  Awaiting bowel function return after exploratory laparotomy with lysis of adhesions for high-grade bowel obstruction.  Abdominal exam is reassuring.  In the meantime continue the NG tube.    Gabe Bowden MD  08/12/23  16:34 EDT

## 2023-08-12 NOTE — PROGRESS NOTES
"DOS: 2023  NAME: Azalea Dudley   : 1945  PCP: Paul Quinteros MD  Chief Complaint   Patient presents with    Abdominal Pain       Chief complaint: Initially she was sleepy because of the effects of the Precedex drip.  Subjective: She was arousable and able to tell me her name \"Azalea Wells\", Russell being her maiden name.  She knew that she was in [place] but could not tell me her date of birth or the current month.  She followed one-step commands only.  She was able to  her upper and lower extremities only for brief intervals of time as she says she is weak all over.  She also voiced that she is very hungry.    Objective:  Vital signs: /60 (BP Location: Left arm, Patient Position: Lying)   Pulse 62   Temp 98.5 øF (36.9 øC) (Oral)   Resp 20   Ht 165.1 cm (65\")   Wt 75.7 kg (166 lb 14.2 oz)   SpO2 94%   BMI 27.77 kg/mý    Gen: NAD, vitals reviewed  MS: Improving since yesterday and following simple commands better than yesterday.  CN: Cranials 2-12: No focal deficits noted.  Motor: Muscles of both upper and lower extremities show good bulk with generalized weakness but able to overcome gravity without any issues.  Sensory: Has good sensations bilaterally.  Coordination: Normal finger-to-nose coordination noted.  She was able to adjust her intranasal cannula.  Gait: Not tested at this time as she is too weak for weightbearing.    ROS:  General: Pleasant lady improving from her underlying major surgery asking for food.  Neurological: As opposed to yesterday when she was not moving the left side at all.    Laboratory results:  Lab Results   Component Value Date    GLUCOSE 145 (H) 2023    CALCIUM 7.1 (L) 2023     (H) 2023    K 4.3 2023    K 4.3 2023    CO2 35.5 (H) 2023     (H) 2023    BUN 40 (H) 2023    CREATININE 0.58 2023    EGFRIFAFRI 80 2021    EGFRIFNONA 98 2021    BCR 69.0 (H) 2023    ANIONGAP 4.5 (L) " 08/12/2023     Lab Results   Component Value Date    WBC 3.96 08/12/2023    HGB 7.3 (L) 08/12/2023    HCT 27.7 (L) 08/12/2023    MCV 86.3 08/12/2023     (L) 08/12/2023     Lab Results   Component Value Date    LDL 74 05/06/2022    LDL 76 11/16/2021    LDL 63 10/27/2020            Review of labs: Low hemoglobin and hematocrit noted at 7.3 and 27.7 respectively.  The renal clearance is normal.  The sodium is elevated at 151.     CMP:        Lab 08/12/23  0412 08/11/23  1533 08/11/23  0359 08/10/23  0342 08/09/23  0701 08/08/23  0523 08/07/23  1535   SODIUM 151* 151* 151* 151* 146*   < > 145   POTASSIUM 4.3  4.3 3.6 3.9 3.8 3.6   < > 4.4   CHLORIDE 111* 109* 108* 107 103   < > 102   CO2 35.5* 37.9* 38.3* 37.1* 37.9*   < > 34.8*   ANION GAP 4.5* 4.1* 4.7* 6.9 5.1   < > 8.2   BUN 40* 38* 37* 30* 29*   < > 23   CREATININE 0.58 0.48* 0.64 0.72 0.54*   < > 0.65   EGFR 93.9 98.3 91.7 86.8 95.6   < > 91.4   GLUCOSE 145* 156* 139* 155* 115*   < > 142*   CALCIUM 7.1* 7.3* 7.4* 7.7* 8.1*   < > 9.2   MAGNESIUM 2.3  --   --   --   --   --   --    PHOSPHORUS 0.3*  --   --   --   --   --   --    TOTAL PROTEIN 5.0*  --  5.7* 5.8*  --   --  6.9   ALBUMIN 2.9*  --  3.4* 3.6  --   --  4.3   GLOBULIN 2.1  --  2.3 2.2  --   --  2.6   ALT (SGPT) 16  --  24 9  --   --  12   AST (SGOT) 14  --  28 14  --   --  19   BILIRUBIN 0.2  --  0.2 0.3  --   --  0.3   ALK PHOS 42  --  53 45  --   --  59   LIPASE  --   --   --   --   --   --  23    < > = values in this interval not displayed.                  Lab Results   Component Value Date    DZANCVQU14 608 10/27/2020           Lab Results   Component Value Date    HGBA1C 6.20 (H) 01/09/2017           Review and interpretation of imaging: The CT of the abdomen and pelvis with contrast performed on August 8, 2023 shows the following:    FINDINGS:  Abdomen: The gallbladder has been removed.  Again seen are  bilateral renal cysts.  There is a 2.9 cm left adrenal nodule,  likely an adenoma in the  absence of a known malignancy.  The  other solid abdominal organs and ureters are unremarkable. There  are moderately dilated multiple loops of proximal and mid small  bowel with collapsed distal small bowel consistent with a  high-grade small bowel obstruction.  No convincing passage of  contrast into the collapsed distal small bowel.  There are  postoperative changes of the cecum, possibly an appendectomy.  There is left-sided diverticulosis.  The GI tract is otherwise  unremarkable.  Pelvis: The urinary bladder is unremarkable.  The  uterus and ovaries are not visualized.  There is no pelvic or  abdominal ascites, adenopathy or acute osseous abnormality.     IMPRESSION:  High-grade distal small bowel obstruction.       CT Head Without Contrast    Result Date: 8/11/2023  PROCEDURE: CT HEAD WO CONTRAST-  HISTORY: Mental status change, unknown cause; K56.600-Partial intestinal obstruction, unspecified as to cause; I48.91-Unspecified atrial fibrillation  COMPARISON: None.  TECHNIQUE: Multiple axial CT images were performed from the foramen magnum to the vertex. Individualized dose reduction techniques using automated exposure control or adjustment of mA and/or kV according to the patient size were employed.  FINDINGS: There is moderate, age-appropriate generalized cerebral atrophy. The ventricles are enlarged, appropriate for atrophy. There is mild asymmetry of the frontal horns of the lateral ventricles. Mild small vessel ischemic disease noted. There is no evidence of edema or hemorrhage.  No masses are identified. No extra-axial fluid is seen. The paranasal sinuses are unremarkable.      Impression: Atrophy  without acute process.       This report was signed and finalized on 8/11/2023 9:49 AM by Angeles Collins MD.         MRI Brain Without Contrast    Result Date: 8/11/2023  FINAL REPORT TECHNIQUE: Multiplanar imaging was performed of the brain without gadolinium infusion. CLINICAL HISTORY: Acute right MCA stroke,  AMS, RECENT SURGERY FOR SBO. FINDINGS: Diffusion-weighted images show no evidence of acute infarction. The ventricles are dilated, but proportionate to the degree of generalized atrophy. Periventricular and deep white matter signal abnormality is consistent with changes of chronic small vessel ischemia. There is no mass or shift of midline structures. There is no intracranial hemorrhage. No significant sinus or osseous abnormality is seen. Appropriate signal flow voids are seen in the major intracranial vessels on T2-weighted images.     Impression: No acute intracranial abnormality. Specifically, no infarction is identified on diffusion-weighted imaging. Authenticated and Electronically Signed by Fidel Knapp M.D. on 08/11/2023 07:50:48 PM      Workup to date:    Results for orders placed in visit on 11/02/22    Adult Transthoracic Echo Complete W/ Cont if Necessary Per Protocol    Interpretation Summary  1.  Normal left ventricular size and systolic function, LVEF 60-65%.  2.  Mild concentric LVH.  3.  Normal LV diastolic filling pattern.  4.  Normal right ventricular size and systolic function.  5.  Mildly increased left atrial volume index.  6.  No significant valvular abnormalities.       Diagnoses: Patient with recent surgical correction of her volvulus improving clinically.      Comment: The there is improvement of her neurological status and she is moving all her extremities.    Plan:  1.  Avoid narcotics and sedatives as much as possible.  2.  Try to wean off the Precedex drip.  3.  She has been asking for food at this point which is a good sign.  4.  Try to mobilize her body getting physical therapy and Occupational Therapy on board.    Discussed with Lori Gardiner the registered nurse in the intensive care unit setting    Spent a total of 30 minutes in face-to-face evaluation and management of the patient using the dedicated telemedicine device without any interruption with the help of Lori Gardiner  the ICU nurse with the patient located at the [facility] and myself at a remote location.    Electronically signed by Jamal Simon MD, 08/12/23, 9:35 AM EDT.

## 2023-08-12 NOTE — PROGRESS NOTES
[facility]    [facility]    Name:  Gabi Dudley   Age:  78 y.o.  Sex:  female  :  1945  MRN:  [id number]   Visit Number:  [id number]  Admission Date:  2023  Date Of Service:  23  Primary Care Physician:  Paul Quinteros MD     LOS: 3 days :    Chief Complaint:      agitation    Subjective:    2023: Saw patient at the bedside more awake conversant today.  Family had concerns addressed. Dr. Simon saw. MRI reviewed with family at bedside. Patient had increased alertness but also too increased agitation for which I added medication in hopes of controlling this. Not hematuria in martinez bag but good urine output gave 1 u prbc.  Hospital course:  2023 patient with disorientation this morning.  Decreased pain medicine dose ordered CT of the head which was negative for acute process.  8/10/2023: Patient converted to sinus rhythm this morning.  Dr. Curry's note reviewed.  Was taken to the OR for adhesiolysis by Dr. Isaac with assist from Dr. Bowden last evening.  Currently n.p.o. with NG tube in place.  2023 patient with RVR overnight was started on Cardizem but that did not help.  Persistent pain discussed with Dr. Isaac taking  to the OR.  Discussed with Dr. Curry who recommended amiodarone and discussed perioperative risk management with Dr. Isaac.  2023 Pain not well controlled increased morphine dose from 1 mg to 2 mg    History Of Presenting Illness:       Patient is a chronically ill 78-year-old female history significant for hypertension, hyperlipidemia, depression/anxiety and CAD who presents to the emergency room with 3 to 4 days of progressively worsening abdominal pain.  Patient describes the pain as intermittent and sharp, diffuse across her abdomen.  Patient has chronic constipation so cannot recall her last bowel movement.  Admits to some mild nausea, denies vomiting.  Patient does have a history of small bowel obstruction which required  colectomy 2018.  States pain is similar but not as severe as previous episode.  Nothing makes it better or worse.  Patient does admit to still passing gas.  Upon arrival to the ER, patient afebrile hemodynamically stable and nonhypoxic.  CMP unremarkable except for glucose 142.  Lactate lipase normal.  WBC 13, hemoglobin hematocrit at baseline, platelets 112.  UA negative for urinary tract infection.  CT abdomen pelvis revealed mid small bowel dilation worrisome for partial small bowel obstruction.  Dr. Isaac agreed to consult hospital service asked to admit.  Edited by: Milton Urban DO at 8/12/2023 [id number]     Review of Systems:     All systems were reviewed and negative except as mentioned in subjective, assessment and plan.    Vital Signs:    Temp:  [97.4 øF (36.3 øC)-98.6 øF (37 øC)] 98.3 øF (36.8 øC)  Heart Rate:  [57-81] 65  Resp:  [13-35] 31  BP: (136-171)/(60-77) 171/77  Arterial Line BP: (140-167)/(65-77) 167/77    Intake and output:    I/O last 3 completed shifts:  In: 2483.5 [I.V.:2483.5]  Out: 2555 [Urine:1530; Emesis/NG output:1025]  I/O this shift:  In: 379 [Blood:379]  Out: -     Physical Examination:    Physical Exam  Vitals reviewed.   Constitutional:       Appearance: Ill-appearing answering questions agitated  HENT:      Head: Normocephalic and atraumatic.      Right Ear: External ear normal.      Left Ear: External ear normal.      Mouth/Throat:      Mouth: Mucous membranes are moist.      Pharynx: Oropharynx is clear.   Eyes:      Extraocular Movements: Extraocular movements intact.      Conjunctiva/sclera: Conjunctivae normal.   Cardiovascular:      Rate and Rhythm: Normal rate and regular rhythm.      Pulses: Normal pulses.      Heart sounds: Normal heart sounds.   Pulmonary:      Effort: Pulmonary effort is normal.      Breath sounds: Expiratory wheeze noted bilaterally.  Abdominal:      General: Bowel sounds are improved.  Mild distension.     Comments: No rebound or  guarding  Musculoskeletal:         General: Normal range of motion.      Right lower leg: No edema.      Left lower leg: No edema.   Skin:     General: Skin is warm and dry.   Neurological:      Mental Status: Alert answering questions but showing signs of visual hallucinations believing that the blood that she was receiving was coming from a tin can trying to climb out of bed pulling at lines showing signs of delirium.  Psychiatric:         Behavior: Behavior appears agitated delirious having hallucinations  Edited by: Milton Urban, DO at 8/12/2023 1258     Laboratory results:    Results from last 7 days   Lab Units 08/12/23  0412 08/11/23  1533 08/11/23  0359 08/10/23  0342   SODIUM mmol/L 151* 151* 151* 151*   POTASSIUM mmol/L 4.3  4.3 3.6 3.9 3.8   CHLORIDE mmol/L 111* 109* 108* 107   CO2 mmol/L 35.5* 37.9* 38.3* 37.1*   BUN mg/dL 40* 38* 37* 30*   CREATININE mg/dL 0.58 0.48* 0.64 0.72   CALCIUM mg/dL 7.1* 7.3* 7.4* 7.7*   BILIRUBIN mg/dL 0.2  --  0.2 0.3   ALK PHOS U/L 42  --  53 45   ALT (SGPT) U/L 16  --  24 9   AST (SGOT) U/L 14  --  28 14   GLUCOSE mg/dL 145* 156* 139* 155*     Results from last 7 days   Lab Units 08/12/23  0412 08/11/23  0359 08/10/23  0342   WBC 10*3/mm3 3.96 8.94 6.29   HEMOGLOBIN g/dL 7.3* 9.3* 9.6*   HEMATOCRIT % 27.7* 34.9 35.6   PLATELETS 10*3/mm3 115* 174 172                 Recent Labs     08/11/23  1335 08/11/23  1558 08/12/23  0717   PHART 7.353 7.357 7.441   HGE5KCF 74.6* 69.9* 54.5*   PO2ART 102.0* 82.2 131.0*   IEJ2GGF 41.4* 39.2* 37.1*   BASEEXCESS 13.9* 12.2* 11.7*      I have reviewed the patient's laboratory results.    Radiology results:    CT Head Without Contrast    Result Date: 8/11/2023  PROCEDURE: CT HEAD WO CONTRAST-  HISTORY: Mental status change, unknown cause; K56.600-Partial intestinal obstruction, unspecified as to cause; I48.91-Unspecified atrial fibrillation  COMPARISON: None.  TECHNIQUE: Multiple axial CT images were performed from the foramen magnum  to the vertex. Individualized dose reduction techniques using automated exposure control or adjustment of mA and/or kV according to the patient size were employed.  FINDINGS: There is moderate, age-appropriate generalized cerebral atrophy. The ventricles are enlarged, appropriate for atrophy. There is mild asymmetry of the frontal horns of the lateral ventricles. Mild small vessel ischemic disease noted. There is no evidence of edema or hemorrhage.  No masses are identified. No extra-axial fluid is seen. The paranasal sinuses are unremarkable.      Impression: Atrophy  without acute process.       This report was signed and finalized on 8/11/2023 9:49 AM by Angeles Collins MD.      MRI Brain Without Contrast    Result Date: 8/11/2023  FINAL REPORT TECHNIQUE: Multiplanar imaging was performed of the brain without gadolinium infusion. CLINICAL HISTORY: Acute right MCA stroke, AMS, RECENT SURGERY FOR SBO. FINDINGS: Diffusion-weighted images show no evidence of acute infarction. The ventricles are dilated, but proportionate to the degree of generalized atrophy. Periventricular and deep white matter signal abnormality is consistent with changes of chronic small vessel ischemia. There is no mass or shift of midline structures. There is no intracranial hemorrhage. No significant sinus or osseous abnormality is seen. Appropriate signal flow voids are seen in the major intracranial vessels on T2-weighted images.     Impression: No acute intracranial abnormality. Specifically, no infarction is identified on diffusion-weighted imaging. Authenticated and Electronically Signed by Fidel Knapp M.D. on 08/11/2023 07:50:48 PM   I have reviewed the patient's radiology reports.    Medication Review:     I have reviewed the patient's active and prn medications.     Problem List:      Partial small bowel obstruction      Assessment/Plan:    Partial small bowel obstruction due to volvulus.    History of colectomy due to obstruction in  2018  Paroxysmal A-fib with RVR   Hypertension  Hyperlipidemia  Depression/anxiety  GERD  Delirium    -Status post adhesiolysis 8/9/2023 Dr. Isaac  -NG tube in place, receiving IV fluids  -Continue IV amiodarone per Dr. Curry's recommendations transition to oral once tolerating  -appreciate Neurology recommendations, no focal deficits now communicating, however with hyperactive delirium, giving haldol, benadryl, ativan, precedex attempting to control behaviors.  - monitor urine output and hematuria            Edited by: Milton Urban DO at 8/12/2023 1258       DVT Prophylaxis: Prophylaxis: lovenox treatment dose   Code Status:   Code Status and Medical Interventions:   Ordered at: 08/07/23 2023     Code Status (Patient has no pulse and is not breathing):    CPR (Attempt to Resuscitate)     Medical Interventions (Patient has pulse or is breathing):    Full Support     Release to patient:    Routine Release      Diet:   Dietary Orders (From admission, onward)       Start     Ordered    08/07/23 2023  NPO Diet NPO Type: Strict NPO  Diet Effective Now        Question:  NPO Type  Answer:  Strict NPO    08/07/23 2023                   Discharge Plan: Disposition: Anticipate short-term rehab discharge in 4 days    Milton Urban DO  08/12/23  12:58 EDT    Dictated utilizing Dragon dictation.

## 2023-08-12 NOTE — PROGRESS NOTES
Pharmacokinetic Consult - Zosyn Dosing  Gabi Dudley is a 78 y.o. female who has been consulted to dose Zosyn for Pneumo    Current Antimicrobial Therapy    Anti-Infectives (From admission, onward)      Ordered     Dose/Rate Route Frequency Start Stop    08/12/23 1722  vancomycin 1000 mg/250 mL 0.9% NS (vial-mate)        Ordering Provider: Milton Urban DO    1,000 mg  over 60 Minutes Intravenous Every 12 Hours 08/13/23 0600 08/18/23 0559    08/12/23 1722  vancomycin IVPB 1500 mg in 0.9% NaCl (Premix) 500 mL        Ordering Provider: Milton Urban DO    20 mg/kg x 75.7 kg  333.3 mL/hr over 90 Minutes Intravenous Once 08/12/23 1815      08/12/23 1712  Pharmacy to Dose Zosyn        Ordering Provider: Milton Urban DO     Does not apply Continuous PRN 08/12/23 1712 08/17/23 1711    08/12/23 1712  Pharmacy to dose vancomycin        Ordering Provider: Milton Urban DO     Does not apply Continuous PRN 08/12/23 1712 08/17/23 1711    08/09/23 1000  ceFAZolin Sodium-Dextrose (ANCEF) IVPB (duplex) 2,000 mg        Ordering Provider: Bianca Isaac DO    2,000 mg  over 30 Minutes Intravenous Once 08/09/23 1100 08/09/23 1107            Microbiology Results (last 10 days)       ** No results found for the last 240 hours. **             Allergies  Morphine and Doxycycline    Relevant clinical data and objective history reviewed:  Creatinine   Date Value Ref Range Status   08/12/2023 0.50 (L) 0.57 - 1.00 mg/dL Final   07/02/2020 0.90 0.60 - 1.30 mg/dL Final     Comment:     Serial Number: [id number]:  [id number]     Estimated Creatinine Clearance: 97.5 mL/min (A) (by C-G formula based on SCr of 0.5 mg/dL (L)).  I/O last 3 completed shifts:  In: 2483.5 [I.V.:2483.5]  Out: 2555 [Urine:1530; Emesis/NG output:1025]  Patient weight: 75.7 kg (166 lb 14.2 oz)    Asessment/Plan  Initiate Zosyn 3.375g IV every 8 hours per extended infusion  Pharmacy will monitor Ms. Dudley's renal function and clinical  status and adjust the Zosyn dose and/or frequency as needed.    Thanks,   Socorro Ferris, PharmD  8/12/2023  17:22 EDT

## 2023-08-12 NOTE — PROGRESS NOTES
"Pharmacy Consult-Vancomycin Dosing    Gabi Dudley is a  78 y.o. female receiving vancomycin therapy.     Indication: Pneumo  Consulting Provider: Rajni    Goal AUC: 400-600 mg/:L*hr    Current Antimicrobial Therapy  Anti-Infectives (From admission, onward)      Ordered     Dose/Rate Route Frequency Start Stop    08/12/23 1712  Pharmacy to Dose Zosyn        Ordering Provider: Milton Urban DO     Does not apply Continuous PRN 08/12/23 1712 08/17/23 1711    08/12/23 1712  Pharmacy to dose vancomycin        Ordering Provider: Milton Urban DO     Does not apply Continuous PRN 08/12/23 1712 08/17/23 1711    08/09/23 1000  ceFAZolin Sodium-Dextrose (ANCEF) IVPB (duplex) 2,000 mg        Ordering Provider: Bianca Isaac DO    2,000 mg  over 30 Minutes Intravenous Once 08/09/23 1100 08/09/23 1107            Labs  Results from last 7 days   Lab Units 08/12/23  1541 08/12/23  0412 08/11/23  1533 08/11/23  0359 08/10/23  0342   WBC 10*3/mm3  --  3.96  --  8.94 6.29   CREATININE mg/dL 0.50* 0.58 0.48* 0.64 0.72      Estimated Creatinine Clearance: 97.5 mL/min (A) (by C-G formula based on SCr of 0.5 mg/dL (L)).  Temp Readings from Last 1 Encounters:   08/12/23 97 øF (36.1 øC) (Temporal)       Microbiology Culture results  Microbiology Results (last 10 days)       ** No results found for the last 240 hours. **            Evaluation of Dosing     Last Dose Received in the ED/Outside Facility: NONE  Is Patient on Dialysis or Renal Replacement: NO    Ht - 165.1 cm (65\")  Wt - 75.7 kg (166 lb 14.2 oz)    Evaluation of Level                      [employer] AUC Calculation    Current AUC:   N/A mg/L*hr    Predicted Steady State AUC on Current Dose: 460 mg/L*hr  _________________________________    Predicted Steady State AUC on New Dose:   N/A mg/L*hr    Assessment/Plan    Pharmacy to dose vancomycin for Pneumo. Goal -600 mg/L*hr.  Patient initiated on loading dose of vancomycin 1500mg (~19.8 mg/kg) IV on " 8/12 @ 1800 followed by a maintenance dose of vancomycin 1000mg (~13.2 mg/kg) IV Q12hr on 8/13 @ 0600.  Assess clearance by vancomycin random level on 8/14 @ 0400.  Pharmacy will continue to monitor renal function, cultures and sensitivities, and clinical status to adjust regimen as necessary.      Thank you,  Socorro Ferris, PharmD  08/12/23 17:15 EDT

## 2023-08-12 NOTE — PLAN OF CARE
Goal Outcome Evaluation: Pt not tolerating removal of bipap due to elevated bp, sob, wheezing and anxiety. Pt now resting well on bipap, vss, no distress or sob noted.

## 2023-08-13 ENCOUNTER — APPOINTMENT (OUTPATIENT)
Dept: CT IMAGING | Facility: HOSPITAL | Age: 78
DRG: 329 | End: 2023-08-13
Payer: MEDICARE

## 2023-08-13 ENCOUNTER — APPOINTMENT (OUTPATIENT)
Dept: GENERAL RADIOLOGY | Facility: HOSPITAL | Age: 78
DRG: 329 | End: 2023-08-13
Payer: MEDICARE

## 2023-08-13 LAB
A-A DO2: 38.8 MMHG
A-A DO2: 48.6 MMHG
ALBUMIN SERPL-MCNC: 3.2 G/DL (ref 3.5–5.2)
ALBUMIN/GLOB SERPL: 1.5 G/DL
ALP SERPL-CCNC: 53 U/L (ref 39–117)
ALT SERPL W P-5'-P-CCNC: 21 U/L (ref 1–33)
ANION GAP SERPL CALCULATED.3IONS-SCNC: 10.3 MMOL/L (ref 5–15)
ANION GAP SERPL CALCULATED.3IONS-SCNC: 13.3 MMOL/L (ref 5–15)
ARTERIAL PATENCY WRIST A: ABNORMAL
ARTERIAL PATENCY WRIST A: ABNORMAL
AST SERPL-CCNC: 19 U/L (ref 1–32)
ATMOSPHERIC PRESS: 732 MMHG
ATMOSPHERIC PRESS: 734 MMHG
BASE EXCESS BLDA CALC-SCNC: 4.2 MMOL/L (ref 0–2)
BASE EXCESS BLDA CALC-SCNC: 4.8 MMOL/L (ref 0–2)
BDY SITE: ABNORMAL
BDY SITE: ABNORMAL
BH BB BLOOD EXPIRATION DATE: NORMAL
BH BB BLOOD TYPE BARCODE: 5100
BH BB DISPENSE STATUS: NORMAL
BH BB PRODUCT CODE: NORMAL
BH BB UNIT NUMBER: NORMAL
BILIRUB SERPL-MCNC: 0.4 MG/DL (ref 0–1.2)
BUN SERPL-MCNC: 31 MG/DL (ref 8–23)
BUN SERPL-MCNC: 31 MG/DL (ref 8–23)
BUN/CREAT SERPL: 43.1 (ref 7–25)
BUN/CREAT SERPL: 53.4 (ref 7–25)
CALCIUM SPEC-SCNC: 7.6 MG/DL (ref 8.6–10.5)
CALCIUM SPEC-SCNC: 8.2 MG/DL (ref 8.6–10.5)
CHLORIDE SERPL-SCNC: 106 MMOL/L (ref 98–107)
CHLORIDE SERPL-SCNC: 110 MMOL/L (ref 98–107)
CO2 SERPL-SCNC: 28.7 MMOL/L (ref 22–29)
CO2 SERPL-SCNC: 30.7 MMOL/L (ref 22–29)
COHGB MFR BLD: 0.4 % (ref 0–2)
COHGB MFR BLD: 0.5 % (ref 0–2)
CREAT SERPL-MCNC: 0.58 MG/DL (ref 0.57–1)
CREAT SERPL-MCNC: 0.72 MG/DL (ref 0.57–1)
CROSSMATCH INTERPRETATION: NORMAL
DEPRECATED RDW RBC AUTO: 58.8 FL (ref 37–54)
EGFRCR SERPLBLD CKD-EPI 2021: 86.8 ML/MIN/1.73
EGFRCR SERPLBLD CKD-EPI 2021: 93.9 ML/MIN/1.73
ERYTHROCYTE [DISTWIDTH] IN BLOOD BY AUTOMATED COUNT: 19.9 % (ref 12.3–15.4)
FOLATE SERPL-MCNC: 14.9 NG/ML (ref 4.78–24.2)
GLOBULIN UR ELPH-MCNC: 2.1 GM/DL
GLUCOSE SERPL-MCNC: 150 MG/DL (ref 65–99)
GLUCOSE SERPL-MCNC: 150 MG/DL (ref 65–99)
HCO3 BLDA-SCNC: 29.7 MMOL/L (ref 22–28)
HCO3 BLDA-SCNC: 30.1 MMOL/L (ref 22–28)
HCT VFR BLD AUTO: 32.5 % (ref 34–46.6)
HCT VFR BLD CALC: 30.2 %
HCT VFR BLD CALC: 33 %
HGB BLD-MCNC: 9.6 G/DL (ref 12–15.9)
INHALED O2 CONCENTRATION: 30 %
INHALED O2 CONCENTRATION: 30 %
Lab: ABNORMAL
MCH RBC QN AUTO: 24.1 PG (ref 26.6–33)
MCHC RBC AUTO-ENTMCNC: 29.5 G/DL (ref 31.5–35.7)
MCV RBC AUTO: 81.7 FL (ref 79–97)
METHGB BLD QL: 0.4 % (ref 0–1.5)
METHGB BLD QL: 0.6 % (ref 0–1.5)
MODALITY: ABNORMAL
MODALITY: ABNORMAL
MRSA DNA SPEC QL NAA+PROBE: NORMAL
NOTE: ABNORMAL
NOTE: ABNORMAL
OXYHGB MFR BLDV: 96 % (ref 94–99)
OXYHGB MFR BLDV: 96.4 % (ref 94–99)
PCO2 BLDA: 45 MM HG (ref 35–45)
PCO2 BLDA: 49.9 MM HG (ref 35–45)
PCO2 TEMP ADJ BLD: ABNORMAL MM[HG]
PCO2 TEMP ADJ BLD: ABNORMAL MM[HG]
PH BLDA: 7.39 PH UNITS (ref 7.35–7.45)
PH BLDA: 7.43 PH UNITS (ref 7.35–7.45)
PH, TEMP CORRECTED: ABNORMAL
PH, TEMP CORRECTED: ABNORMAL
PHOSPHATE SERPL-MCNC: 4.7 MG/DL (ref 2.5–4.5)
PLATELET # BLD AUTO: 133 10*3/MM3 (ref 140–450)
PMV BLD AUTO: 11.4 FL (ref 6–12)
PO2 BLDA: 107 MM HG (ref 75–100)
PO2 BLDA: 112 MM HG (ref 75–100)
PO2 TEMP ADJ BLD: ABNORMAL MM[HG]
PO2 TEMP ADJ BLD: ABNORMAL MM[HG]
POTASSIUM SERPL-SCNC: 3.3 MMOL/L (ref 3.5–5.2)
POTASSIUM SERPL-SCNC: 3.4 MMOL/L (ref 3.5–5.2)
PROT SERPL-MCNC: 5.3 G/DL (ref 6–8.5)
RBC # BLD AUTO: 3.98 10*6/MM3 (ref 3.77–5.28)
SAO2 % BLDCOA: 97 % (ref 94–100)
SAO2 % BLDCOA: 97.3 % (ref 94–100)
SODIUM SERPL-SCNC: 149 MMOL/L (ref 136–145)
SODIUM SERPL-SCNC: 150 MMOL/L (ref 136–145)
UNIT  ABO: NORMAL
UNIT  RH: NORMAL
VENTILATOR MODE: ABNORMAL
VENTILATOR MODE: ABNORMAL
VIT B12 BLD-MCNC: 433 PG/ML (ref 211–946)
WBC NRBC COR # BLD: 5.17 10*3/MM3 (ref 3.4–10.8)

## 2023-08-13 PROCEDURE — 25010000002 METHYLPREDNISOLONE PER 40 MG: Performed by: INTERNAL MEDICINE

## 2023-08-13 PROCEDURE — 85027 COMPLETE CBC AUTOMATED: CPT | Performed by: INTERNAL MEDICINE

## 2023-08-13 PROCEDURE — 84100 ASSAY OF PHOSPHORUS: CPT | Performed by: INTERNAL MEDICINE

## 2023-08-13 PROCEDURE — 94799 UNLISTED PULMONARY SVC/PX: CPT

## 2023-08-13 PROCEDURE — 25010000002 DIPHENHYDRAMINE PER 50 MG: Performed by: INTERNAL MEDICINE

## 2023-08-13 PROCEDURE — 94761 N-INVAS EAR/PLS OXIMETRY MLT: CPT

## 2023-08-13 PROCEDURE — 25010000002 KETOROLAC TROMETHAMINE PER 15 MG: Performed by: INTERNAL MEDICINE

## 2023-08-13 PROCEDURE — 0 POTASSIUM CHLORIDE PER 2 MEQ: Performed by: INTERNAL MEDICINE

## 2023-08-13 PROCEDURE — 70360 X-RAY EXAM OF NECK: CPT

## 2023-08-13 PROCEDURE — 82805 BLOOD GASES W/O2 SATURATION: CPT

## 2023-08-13 PROCEDURE — 94660 CPAP INITIATION&MGMT: CPT

## 2023-08-13 PROCEDURE — 25010000002 DIPHENHYDRAMINE PER 50 MG

## 2023-08-13 PROCEDURE — 25010000002 PIPERACILLIN SOD-TAZOBACTAM PER 1 G: Performed by: INTERNAL MEDICINE

## 2023-08-13 PROCEDURE — 25010000002 LORAZEPAM PER 2 MG: Performed by: INTERNAL MEDICINE

## 2023-08-13 PROCEDURE — 99232 SBSQ HOSP IP/OBS MODERATE 35: CPT | Performed by: PSYCHIATRY & NEUROLOGY

## 2023-08-13 PROCEDURE — 99232 SBSQ HOSP IP/OBS MODERATE 35: CPT | Performed by: INTERNAL MEDICINE

## 2023-08-13 PROCEDURE — 25010000002 ZIPRASIDONE MESYLATE PER 10 MG

## 2023-08-13 PROCEDURE — 25010000002 VANCOMYCIN 1 G RECONSTITUTED SOLUTION: Performed by: INTERNAL MEDICINE

## 2023-08-13 PROCEDURE — 25010000002 CHLOROTHIAZIDE PER 500 MG: Performed by: INTERNAL MEDICINE

## 2023-08-13 PROCEDURE — 25010000002 HALOPERIDOL LACTATE PER 5 MG: Performed by: INTERNAL MEDICINE

## 2023-08-13 PROCEDURE — 87641 MR-STAPH DNA AMP PROBE: CPT | Performed by: INTERNAL MEDICINE

## 2023-08-13 PROCEDURE — 25010000002 HYDROMORPHONE 1 MG/ML SOLUTION: Performed by: INTERNAL MEDICINE

## 2023-08-13 PROCEDURE — 80053 COMPREHEN METABOLIC PANEL: CPT | Performed by: INTERNAL MEDICINE

## 2023-08-13 PROCEDURE — 70490 CT SOFT TISSUE NECK W/O DYE: CPT

## 2023-08-13 PROCEDURE — 83050 HGB METHEMOGLOBIN QUAN: CPT

## 2023-08-13 PROCEDURE — 25010000002 AMIODARONE IN DEXTROSE 5% 360-4.14 MG/200ML-% SOLUTION: Performed by: INTERNAL MEDICINE

## 2023-08-13 PROCEDURE — 71045 X-RAY EXAM CHEST 1 VIEW: CPT

## 2023-08-13 PROCEDURE — 25010000002 HYDRALAZINE PER 20 MG: Performed by: INTERNAL MEDICINE

## 2023-08-13 PROCEDURE — 99024 POSTOP FOLLOW-UP VISIT: CPT | Performed by: SURGERY

## 2023-08-13 PROCEDURE — 71250 CT THORAX DX C-: CPT

## 2023-08-13 PROCEDURE — 25010000002 METHYLPREDNISOLONE PER 125 MG

## 2023-08-13 PROCEDURE — 82746 ASSAY OF FOLIC ACID SERUM: CPT | Performed by: PSYCHIATRY & NEUROLOGY

## 2023-08-13 PROCEDURE — 25010000002 ENOXAPARIN PER 10 MG: Performed by: INTERNAL MEDICINE

## 2023-08-13 PROCEDURE — 82375 ASSAY CARBOXYHB QUANT: CPT

## 2023-08-13 PROCEDURE — 82607 VITAMIN B-12: CPT | Performed by: PSYCHIATRY & NEUROLOGY

## 2023-08-13 RX ORDER — LORAZEPAM 2 MG/ML
1 INJECTION INTRAMUSCULAR EVERY 4 HOURS PRN
Status: DISCONTINUED | OUTPATIENT
Start: 2023-08-13 | End: 2023-08-18 | Stop reason: HOSPADM

## 2023-08-13 RX ORDER — POTASSIUM CHLORIDE 29.8 MG/ML
20 INJECTION INTRAVENOUS
Status: COMPLETED | OUTPATIENT
Start: 2023-08-13 | End: 2023-08-13

## 2023-08-13 RX ORDER — DIPHENHYDRAMINE HYDROCHLORIDE 50 MG/ML
INJECTION INTRAMUSCULAR; INTRAVENOUS
Status: COMPLETED
Start: 2023-08-13 | End: 2023-08-13

## 2023-08-13 RX ORDER — KETOROLAC TROMETHAMINE 30 MG/ML
30 INJECTION, SOLUTION INTRAMUSCULAR; INTRAVENOUS ONCE AS NEEDED
Status: COMPLETED | OUTPATIENT
Start: 2023-08-13 | End: 2023-08-13

## 2023-08-13 RX ORDER — METHYLPREDNISOLONE SODIUM SUCCINATE 40 MG/ML
40 INJECTION, POWDER, LYOPHILIZED, FOR SOLUTION INTRAMUSCULAR; INTRAVENOUS EVERY 8 HOURS
Status: DISCONTINUED | OUTPATIENT
Start: 2023-08-14 | End: 2023-08-14

## 2023-08-13 RX ORDER — ZIPRASIDONE MESYLATE 20 MG/ML
INJECTION, POWDER, LYOPHILIZED, FOR SOLUTION INTRAMUSCULAR
Status: COMPLETED
Start: 2023-08-13 | End: 2023-08-13

## 2023-08-13 RX ORDER — LABETALOL HYDROCHLORIDE 5 MG/ML
10 INJECTION, SOLUTION INTRAVENOUS EVERY 4 HOURS PRN
Status: DISCONTINUED | OUTPATIENT
Start: 2023-08-13 | End: 2023-08-18 | Stop reason: HOSPADM

## 2023-08-13 RX ORDER — METHYLPREDNISOLONE SODIUM SUCCINATE 125 MG/2ML
INJECTION, POWDER, LYOPHILIZED, FOR SOLUTION INTRAMUSCULAR; INTRAVENOUS
Status: COMPLETED
Start: 2023-08-13 | End: 2023-08-13

## 2023-08-13 RX ORDER — METHYLPREDNISOLONE SODIUM SUCCINATE 125 MG/2ML
60 INJECTION, POWDER, LYOPHILIZED, FOR SOLUTION INTRAMUSCULAR; INTRAVENOUS EVERY 8 HOURS
Status: DISCONTINUED | OUTPATIENT
Start: 2023-08-13 | End: 2023-08-13

## 2023-08-13 RX ORDER — CHLOROTHIAZIDE SODIUM 500 MG/1
500 INJECTION INTRAVENOUS ONCE
Status: COMPLETED | OUTPATIENT
Start: 2023-08-13 | End: 2023-08-13

## 2023-08-13 RX ORDER — DIPHENHYDRAMINE HYDROCHLORIDE 50 MG/ML
50 INJECTION INTRAMUSCULAR; INTRAVENOUS EVERY 6 HOURS
Status: DISCONTINUED | OUTPATIENT
Start: 2023-08-13 | End: 2023-08-15

## 2023-08-13 RX ORDER — FLUTICASONE PROPIONATE 50 MCG
2 SPRAY, SUSPENSION (ML) NASAL 2 TIMES DAILY
Status: DISCONTINUED | OUTPATIENT
Start: 2023-08-13 | End: 2023-08-18 | Stop reason: HOSPADM

## 2023-08-13 RX ORDER — ZIPRASIDONE MESYLATE 20 MG/ML
10 INJECTION, POWDER, LYOPHILIZED, FOR SOLUTION INTRAMUSCULAR EVERY 4 HOURS PRN
Status: DISCONTINUED | OUTPATIENT
Start: 2023-08-13 | End: 2023-08-18 | Stop reason: HOSPADM

## 2023-08-13 RX ADMIN — DIPHENHYDRAMINE HYDROCHLORIDE 50 MG: 50 INJECTION INTRAMUSCULAR; INTRAVENOUS at 17:50

## 2023-08-13 RX ADMIN — HYDRALAZINE HYDROCHLORIDE 10 MG: 20 INJECTION, SOLUTION INTRAMUSCULAR; INTRAVENOUS at 09:03

## 2023-08-13 RX ADMIN — HYDROMORPHONE HYDROCHLORIDE 1 MG: 1 INJECTION, SOLUTION INTRAMUSCULAR; INTRAVENOUS; SUBCUTANEOUS at 22:42

## 2023-08-13 RX ADMIN — KETOROLAC TROMETHAMINE 30 MG: 30 INJECTION, SOLUTION INTRAMUSCULAR; INTRAVENOUS at 03:45

## 2023-08-13 RX ADMIN — DEXMEDETOMIDINE HYDROCHLORIDE 1.5 MCG/KG/HR: 400 INJECTION INTRAVENOUS at 07:04

## 2023-08-13 RX ADMIN — DIPHENHYDRAMINE HYDROCHLORIDE 50 MG: 50 INJECTION INTRAMUSCULAR; INTRAVENOUS at 10:27

## 2023-08-13 RX ADMIN — METHYLPREDNISOLONE SODIUM SUCCINATE 40 MG: 40 INJECTION, POWDER, FOR SOLUTION INTRAMUSCULAR; INTRAVENOUS at 02:34

## 2023-08-13 RX ADMIN — PIPERACILLIN SODIUM AND TAZOBACTAM SODIUM 3.38 G: 3; .375 INJECTION, SOLUTION INTRAVENOUS at 15:36

## 2023-08-13 RX ADMIN — HYDRALAZINE HYDROCHLORIDE 10 MG: 20 INJECTION, SOLUTION INTRAMUSCULAR; INTRAVENOUS at 05:18

## 2023-08-13 RX ADMIN — BUDESONIDE 1 MG: 0.5 INHALANT RESPIRATORY (INHALATION) at 18:10

## 2023-08-13 RX ADMIN — HYDROMORPHONE HYDROCHLORIDE 1 MG: 1 INJECTION, SOLUTION INTRAMUSCULAR; INTRAVENOUS; SUBCUTANEOUS at 12:33

## 2023-08-13 RX ADMIN — POTASSIUM CHLORIDE 20 MEQ: 29.8 INJECTION, SOLUTION INTRAVENOUS at 10:37

## 2023-08-13 RX ADMIN — Medication 10 ML: at 20:49

## 2023-08-13 RX ADMIN — HALOPERIDOL LACTATE 5 MG: 5 INJECTION, SOLUTION INTRAMUSCULAR at 09:51

## 2023-08-13 RX ADMIN — Medication 10 ML: at 08:39

## 2023-08-13 RX ADMIN — RACEPINEPHRINE HYDROCHLORIDE 0.5 ML: 11.25 SOLUTION RESPIRATORY (INHALATION) at 10:01

## 2023-08-13 RX ADMIN — METHYLPREDNISOLONE SODIUM SUCCINATE 60 MG: 125 INJECTION, POWDER, LYOPHILIZED, FOR SOLUTION INTRAMUSCULAR; INTRAVENOUS at 10:28

## 2023-08-13 RX ADMIN — IPRATROPIUM BROMIDE AND ALBUTEROL SULFATE 3 ML: 2.5; .5 SOLUTION RESPIRATORY (INHALATION) at 18:10

## 2023-08-13 RX ADMIN — ENOXAPARIN SODIUM 70 MG: 100 INJECTION SUBCUTANEOUS at 20:48

## 2023-08-13 RX ADMIN — METHYLPREDNISOLONE SODIUM SUCCINATE 60 MG: 125 INJECTION, POWDER, LYOPHILIZED, FOR SOLUTION INTRAMUSCULAR; INTRAVENOUS at 12:35

## 2023-08-13 RX ADMIN — ZIPRASIDONE MESYLATE 10 MG: 20 INJECTION, POWDER, LYOPHILIZED, FOR SOLUTION INTRAMUSCULAR at 22:43

## 2023-08-13 RX ADMIN — ARFORMOTEROL TARTRATE 15 MCG: 15 SOLUTION RESPIRATORY (INHALATION) at 07:07

## 2023-08-13 RX ADMIN — SODIUM CHLORIDE 1000 MG: 900 INJECTION, SOLUTION INTRAVENOUS at 05:15

## 2023-08-13 RX ADMIN — POTASSIUM CHLORIDE 20 MEQ: 29.8 INJECTION, SOLUTION INTRAVENOUS at 20:35

## 2023-08-13 RX ADMIN — POTASSIUM CHLORIDE 20 MEQ: 29.8 INJECTION, SOLUTION INTRAVENOUS at 19:18

## 2023-08-13 RX ADMIN — IPRATROPIUM BROMIDE AND ALBUTEROL SULFATE 3 ML: 2.5; .5 SOLUTION RESPIRATORY (INHALATION) at 07:07

## 2023-08-13 RX ADMIN — POTASSIUM CHLORIDE 20 MEQ: 29.8 INJECTION, SOLUTION INTRAVENOUS at 08:40

## 2023-08-13 RX ADMIN — HYDROMORPHONE HYDROCHLORIDE 1 MG: 1 INJECTION, SOLUTION INTRAMUSCULAR; INTRAVENOUS; SUBCUTANEOUS at 19:59

## 2023-08-13 RX ADMIN — LORAZEPAM 1 MG: 2 INJECTION INTRAMUSCULAR; INTRAVENOUS at 09:51

## 2023-08-13 RX ADMIN — METOPROLOL TARTRATE 5 MG: 1 INJECTION, SOLUTION INTRAVENOUS at 15:36

## 2023-08-13 RX ADMIN — METHYLPREDNISOLONE SODIUM SUCCINATE 60 MG: 125 INJECTION, POWDER, FOR SOLUTION INTRAMUSCULAR; INTRAVENOUS at 12:35

## 2023-08-13 RX ADMIN — PIPERACILLIN SODIUM AND TAZOBACTAM SODIUM 3.38 G: 3; .375 INJECTION, SOLUTION INTRAVENOUS at 08:39

## 2023-08-13 RX ADMIN — METHYLPREDNISOLONE SODIUM SUCCINATE 60 MG: 125 INJECTION, POWDER, LYOPHILIZED, FOR SOLUTION INTRAMUSCULAR; INTRAVENOUS at 17:50

## 2023-08-13 RX ADMIN — ARFORMOTEROL TARTRATE 15 MCG: 15 SOLUTION RESPIRATORY (INHALATION) at 18:10

## 2023-08-13 RX ADMIN — AMIODARONE HYDROCHLORIDE 0.5 MG/MIN: 1.8 INJECTION, SOLUTION INTRAVENOUS at 02:34

## 2023-08-13 RX ADMIN — SODIUM CHLORIDE 1000 MG: 900 INJECTION, SOLUTION INTRAVENOUS at 17:51

## 2023-08-13 RX ADMIN — DIPHENHYDRAMINE HYDROCHLORIDE 50 MG: 50 INJECTION INTRAMUSCULAR; INTRAVENOUS at 23:43

## 2023-08-13 RX ADMIN — HALOPERIDOL LACTATE 5 MG: 5 INJECTION, SOLUTION INTRAMUSCULAR at 21:16

## 2023-08-13 RX ADMIN — SODIUM CHLORIDE 500 ML: 9 INJECTION, SOLUTION INTRAVENOUS at 00:33

## 2023-08-13 RX ADMIN — DEXMEDETOMIDINE HYDROCHLORIDE 0.2 MCG/KG/HR: 400 INJECTION INTRAVENOUS at 10:56

## 2023-08-13 RX ADMIN — DEXMEDETOMIDINE HYDROCHLORIDE 0.2 MCG/KG/HR: 400 INJECTION INTRAVENOUS at 15:18

## 2023-08-13 RX ADMIN — HALOPERIDOL LACTATE 5 MG: 5 INJECTION, SOLUTION INTRAMUSCULAR at 15:36

## 2023-08-13 RX ADMIN — ENOXAPARIN SODIUM 70 MG: 100 INJECTION SUBCUTANEOUS at 08:39

## 2023-08-13 RX ADMIN — Medication 10 ML: at 08:38

## 2023-08-13 RX ADMIN — LORAZEPAM 1 MG: 2 INJECTION INTRAMUSCULAR; INTRAVENOUS at 21:37

## 2023-08-13 RX ADMIN — METOPROLOL TARTRATE 5 MG: 1 INJECTION, SOLUTION INTRAVENOUS at 08:39

## 2023-08-13 RX ADMIN — IPRATROPIUM BROMIDE AND ALBUTEROL SULFATE 1.5 ML: .5; 3 SOLUTION RESPIRATORY (INHALATION) at 09:55

## 2023-08-13 RX ADMIN — DEXMEDETOMIDINE HYDROCHLORIDE 1.5 MCG/KG/HR: 400 INJECTION INTRAVENOUS at 04:19

## 2023-08-13 RX ADMIN — Medication 10 ML: at 20:36

## 2023-08-13 RX ADMIN — Medication 10 ML: at 20:52

## 2023-08-13 RX ADMIN — BUDESONIDE 1 MG: 0.5 INHALANT RESPIRATORY (INHALATION) at 07:07

## 2023-08-13 RX ADMIN — TIOTROPIUM BROMIDE INHALATION SPRAY 2 PUFF: 3.12 SPRAY, METERED RESPIRATORY (INHALATION) at 08:41

## 2023-08-13 RX ADMIN — DEXMEDETOMIDINE HYDROCHLORIDE 0.2 MCG/KG/HR: 400 INJECTION INTRAVENOUS at 17:56

## 2023-08-13 RX ADMIN — METOPROLOL TARTRATE 5 MG: 1 INJECTION, SOLUTION INTRAVENOUS at 02:34

## 2023-08-13 RX ADMIN — PIPERACILLIN SODIUM AND TAZOBACTAM SODIUM 3.38 G: 3; .375 INJECTION, SOLUTION INTRAVENOUS at 23:44

## 2023-08-13 RX ADMIN — AMIODARONE HYDROCHLORIDE 0.5 MG/MIN: 1.8 INJECTION, SOLUTION INTRAVENOUS at 14:00

## 2023-08-13 RX ADMIN — CHLOROTHIAZIDE SODIUM 500 MG: 500 INJECTION, POWDER, LYOPHILIZED, FOR SOLUTION INTRAVENOUS at 08:38

## 2023-08-13 RX ADMIN — METOPROLOL TARTRATE 5 MG: 1 INJECTION, SOLUTION INTRAVENOUS at 20:48

## 2023-08-13 RX ADMIN — HYDROMORPHONE HYDROCHLORIDE 1 MG: 1 INJECTION, SOLUTION INTRAMUSCULAR; INTRAVENOUS; SUBCUTANEOUS at 03:29

## 2023-08-13 RX ADMIN — FLUTICASONE PROPIONATE 2 SPRAY: 50 SPRAY, METERED NASAL at 14:22

## 2023-08-13 RX ADMIN — FLUTICASONE PROPIONATE 2 SPRAY: 50 SPRAY, METERED NASAL at 20:35

## 2023-08-13 RX ADMIN — HYDRALAZINE HYDROCHLORIDE 10 MG: 20 INJECTION, SOLUTION INTRAMUSCULAR; INTRAVENOUS at 22:21

## 2023-08-13 RX ADMIN — METHYLPREDNISOLONE SODIUM SUCCINATE 40 MG: 40 INJECTION, POWDER, FOR SOLUTION INTRAMUSCULAR; INTRAVENOUS at 10:00

## 2023-08-13 NOTE — PROGRESS NOTES
[facility]    Name:  Gabi Dudley   Age:  78 y.o.  Sex:  female  :  1945  MRN:  [id number]   Visit Number:  [id number]  Admission Date:  2023  Date Of Service:  23  Primary Care Physician:  Paul Quinteros MD     LOS: 4 days :    Chief Complaint:      Shortness of air    Subjective:    2023: Patient had an episode of respiratory distress today.  Was trying to come off the BiPAP.  Had some upper airway sounds concerning for stridor.  Was given medications to help BiPAP synchrony including Haldol Benadryl Ativan.  ABG reviewed.  Following this her heart rate normalized from the 160s to the 80s went from atrial fibrillation back to sinus rhythm.  Went down for a CT which ruled out tracheal obstruction.  Updated Dr. Poe on these changes.  Discussed with family.  Hospital course:  2023: Saw patient at the bedside more awake conversant today.  Family had concerns addressed. Dr. Simon saw. MRI reviewed with family at bedside. Patient had increased alertness but also too increased agitation for which I added medication in hopes of controlling this. Not hematuria in martinez bag but good urine output gave 1 u prbc.  2023 patient with disorientation this morning.  Decreased pain medicine dose ordered CT of the head which was negative for acute process.  8/10/2023: Patient converted to sinus rhythm this morning.  Dr. Curry's note reviewed.  Was taken to the OR for adhesiolysis by Dr. Isaac with assist from Dr. Bowden last evening.  Currently n.p.o. with NG tube in place.  2023 patient with RVR overnight was started on Cardizem but that did not help.  Persistent pain discussed with Dr. Isaac taking  to the OR.  Discussed with Dr. Curry who recommended amiodarone and discussed perioperative risk management with Dr. Isaac.  2023 Pain not well controlled increased morphine dose from 1 mg to 2 mg    History Of Presenting Illness:        Patient is a chronically ill 78-year-old female history significant for hypertension, hyperlipidemia, depression/anxiety and CAD who presents to the emergency room with 3 to 4 days of progressively worsening abdominal pain.  Patient describes the pain as intermittent and sharp, diffuse across her abdomen.  Patient has chronic constipation so cannot recall her last bowel movement.  Admits to some mild nausea, denies vomiting.  Patient does have a history of small bowel obstruction which required colectomy 2018.  States pain is similar but not as severe as previous episode.  Nothing makes it better or worse.  Patient does admit to still passing gas.  Upon arrival to the ER, patient afebrile hemodynamically stable and nonhypoxic.  CMP unremarkable except for glucose 142.  Lactate lipase normal.  WBC 13, hemoglobin hematocrit at baseline, platelets 112.  UA negative for urinary tract infection.  CT abdomen pelvis revealed mid small bowel dilation worrisome for partial small bowel obstruction.  Dr. Isaac agreed to consult hospital service asked to admit.  Edited by: Milton Urban DO at 8/13/2023 1230     Review of Systems:     All systems were reviewed and negative except as mentioned in subjective, assessment and plan.    Vital Signs:    Temp:  [97 øF (36.1 øC)-99.1 øF (37.3 øC)] 97.6 øF (36.4 øC)  Heart Rate:  [] 64  Resp:  [17-35] 23  BP: (107-189)/() 144/75    Intake and output:    I/O last 3 completed shifts:  In: 3578.6 [I.V.:2820.6; Blood:379; IV Piggyback:379]  Out: 4105 [Urine:3280; Emesis/NG output:825]  I/O this shift:  In: 856 [I.V.:706; IV Piggyback:150]  Out: 75 [Urine:75]    Physical Examination:    Physical Exam  Vitals reviewed.   Constitutional:       Appearance: Ill-appearing answering questions agitated  HENT:      Head: Normocephalic and atraumatic.      Right Ear: External ear normal.      Left Ear: External ear normal.      Mouth/Throat:      Mouth: Mucous membranes are moist.       Pharynx: Oropharynx is clear.   Eyes:      Extraocular Movements: Extraocular movements intact.      Conjunctiva/sclera: Conjunctivae normal.   Cardiovascular:      Rate and Rhythm: Normal rate and regular rhythm.      Pulses: Normal pulses.      Heart sounds: Normal heart sounds.   Pulmonary:      Effort: Pulmonary effort is labored respiratory rate is increased     Breath sounds: Diminished breath sounds bilaterally with transmitted upper airway sounds  Abdominal:      General: Bowel sounds are improved.  Mild distension.     Comments: No rebound or guarding  Musculoskeletal:         General: Normal range of motion.      Right lower leg: No edema.      Left lower leg: No edema.   Skin:     General: Skin is warm and dry.   Neurological:      Mental Status: Alert was more appropriate cognitively today as compared to yesterday however she was agitated due to her respiratory distress more so than any delirium or hallucinations at the time my exam.  Psychiatric:         Behavior: Behavior appears appropriate given her respiratory distress  Edited by: Milton Urban DO at 8/13/2023 1230     Laboratory results:    Results from last 7 days   Lab Units 08/13/23  0352 08/12/23  1541 08/12/23  0412 08/11/23  1533 08/11/23  0359   SODIUM mmol/L 150* 148* 151*   < > 151*   POTASSIUM mmol/L 3.3* 3.9 4.3  4.3   < > 3.9   CHLORIDE mmol/L 106 112* 111*   < > 108*   CO2 mmol/L 30.7* 28.2 35.5*   < > 38.3*   BUN mg/dL 31* 34* 40*   < > 37*   CREATININE mg/dL 0.72 0.50* 0.58   < > 0.64   CALCIUM mg/dL 8.2* 6.6* 7.1*   < > 7.4*   BILIRUBIN mg/dL 0.4  --  0.2  --  0.2   ALK PHOS U/L 53  --  42  --  53   ALT (SGPT) U/L 21  --  16  --  24   AST (SGOT) U/L 19  --  14  --  28   GLUCOSE mg/dL 150* 159* 145*   < > 139*    < > = values in this interval not displayed.     Results from last 7 days   Lab Units 08/13/23  0352 08/12/23  1541 08/12/23  0412 08/11/23  0359   WBC 10*3/mm3 5.17  --  3.96 8.94   HEMOGLOBIN g/dL 9.6* 9.2*  7.3* 9.3*   HEMATOCRIT % 32.5* 32.3* 27.7* 34.9   PLATELETS 10*3/mm3 133*  --  115* 174                 Recent Labs     08/12/23  0717 08/13/23  0339 08/13/23  1030   PHART 7.441 7.428 7.388   NJH8KXS 54.5* 45.0 49.9*   PO2ART 131.0* 107.0* 112.0*   TKM1ABM 37.1* 29.7* 30.1*   BASEEXCESS 11.7* 4.8* 4.2*      I have reviewed the patient's laboratory results.    Radiology results:    XR Neck Soft Tissue    Result Date: 8/13/2023  Neck soft tissue  HISTORY: Stridor  FINDINGS: 2 views of the neck were obtained. The base of the neck is obscured by the shoulders on the lateral view. A right jugular deep line is present. A nasogastric tube is also noted. The visualized airway appears normal. The epiglottis appears normal.      Impression: No focal airway abnormality identified.  This report was signed and finalized on 8/13/2023 12:08 PM by Maximo Epps MD.      CT Soft Tissue Neck Without Contrast    Result Date: 8/13/2023  PROCEDURE: CT SOFT TISSUE NECK WO CONTRAST-  HISTORY: Epiglottitis or tonsillitis suspected; K56.600-Partial intestinal obstruction, unspecified as to cause; I48.91-Unspecified atrial fibrillation  TECHNIQUE:  Thin section axial CT images were obtained through the neck without intravenous contrast administration. Sagittal and coronal reformatted images were also obtained. This study was performed with techniques to keep radiation doses as low as reasonably achievable, (ALARA). Individualized dose reduction techniques using automated exposure control or adjustment of mA and/or kV according to the patient size were employed.  COMPARISON: None.  FINDINGS: A feeding tube and right jugular deep line are present. The nasopharynx and oropharynx have an unremarkable appearance. The epiglottis has an unremarkable appearance. The hypopharynx has an unremarkable appearance. There is some motion at the level of the larynx which otherwise appears normal. The thyroid gland is unremarkable. Moderate bilateral  carotid artery calcifications are noted. Mild mucosal thickening is seen of the right sphenoid sinus. Moderate degenerative changes are noted of the cervical spine.       Impression: No CT evidence of epiglottitis or tonsillitis identified..       This report was signed and finalized on 8/13/2023 11:46 AM by Maximo Epps MD.      CT Chest Without Contrast Diagnostic    Result Date: 8/13/2023  PROCEDURE: CT CHEST WO CONTRAST DIAGNOSTIC-  HISTORY: Abnormal xray - lung opacity/opacities; K56.600-Partial intestinal obstruction, unspecified as to cause; I48.91-Unspecified atrial fibrillation  COMPARISON: July 20, 2023 CT  FINDINGS: Axial CT images of the chest were obtained without contrast. Coronal and sagittal reformatted images were also obtained. This study was performed with techniques to keep radiation doses as low as reasonably achievable, (ALARA). Individualized dose reduction techniques using automated exposure control or adjustment of mA and/or kV according to the patient size were employed.  A right jugular deep line and feeding tube are present. There is no evidence of mediastinal mass or adenopathy. No axillary mass or adenopathy is identified. Mild emphysema is noted. There are new small nodular opacities in the posterior left upper lobe which are likely inflammatory. Small bilateral pleural effusions are seen with mild bibasilar atelectasis. No chest wall abnormality is identified. Limited images of the upper abdomen reveal postoperative changes from cholecystectomy. There are several small nonobstructing renal stones. A 5.5 cm cystic mass is seen at the lateral aspect of the right kidney with calcification in its wall. A mildly complicated cyst is favored. There is mild anasarca.       Impression: New small nodular opacities in the posterior left upper lobe which are likely inflammatory.  Small pleural effusions with mild bibasilar atelectasis.  Cystic lateral right renal mass, favor a mildly  complicated cyst.    This report was signed and finalized on 8/13/2023 11:42 AM by Maximo Epps MD.      MRI Brain Without Contrast    Result Date: 8/11/2023  FINAL REPORT TECHNIQUE: Multiplanar imaging was performed of the brain without gadolinium infusion. CLINICAL HISTORY: Acute right MCA stroke, AMS, RECENT SURGERY FOR SBO. FINDINGS: Diffusion-weighted images show no evidence of acute infarction. The ventricles are dilated, but proportionate to the degree of generalized atrophy. Periventricular and deep white matter signal abnormality is consistent with changes of chronic small vessel ischemia. There is no mass or shift of midline structures. There is no intracranial hemorrhage. No significant sinus or osseous abnormality is seen. Appropriate signal flow voids are seen in the major intracranial vessels on T2-weighted images.     Impression: No acute intracranial abnormality. Specifically, no infarction is identified on diffusion-weighted imaging. Authenticated and Electronically Signed by Fidel Knapp M.D. on 08/11/2023 07:50:48 PM    XR Chest 1 View    Result Date: 8/13/2023  PROCEDURE: XR CHEST 1 VW-  HISTORY: sob; K56.600-Partial intestinal obstruction, unspecified as to cause; I48.91-Unspecified atrial fibrillation   COMPARISON: August 12, 2023.  FINDINGS: Cardiomegaly is noted. A nasogastric tube and right jugular deep line remain in place. There are persistent mild left lung base opacities, favor atelectasis. There is no pneumothorax. The bony thorax in intact.      Impression: Stable mild left lung base opacities, favor atelectasis.    This report was signed and finalized on 8/13/2023 11:48 AM by Maximo Epps MD.      XR Chest 1 View    Result Date: 8/12/2023  FINAL REPORT CLINICAL HISTORY: sob FINDINGS: A right IJ catheter is in good position.  An NG tube passes below the diaphragm. The heart is normal in size. The mediastinum is unremarkable. Prominent perihilar interstitial changes are noted, likely  secondary to vascular congestion. There is no pneumothorax. Osseous structures unremarkable.     Impression: Prominent interstitial changes are likely secondary to vascular congestion.  Continued followup recommended. Authenticated and Electronically Signed by Jasiel Browne MD on 08/12/2023 05:31:09 PM   I have reviewed the patient's radiology reports.    Medication Review:     I have reviewed the patient's active and prn medications.     Problem List:      Partial small bowel obstruction      Assessment/Plan:    Partial small bowel obstruction due to volvulus.    History of colectomy due to obstruction in 2018  Paroxysmal A-fib with RVR   Hypertension  Hyperlipidemia  Depression/anxiety  GERD  Delirium    -Status post adhesiolysis 8/9/2023 Dr. Isaac  -NG tube in place, IV fluids on hold for concern for increasing volume overload.  -Continue IV amiodarone per Dr. Curry's recommendations transition to oral once tolerating  -appreciate Neurology recommendations, no focal deficits now communicating, however with hyperactive delirium, giving haldol, benadryl, ativan, precedex attempting to calm her agitation.  -Started racemic epinephrine increased her IV Solu-Medrol dose.  Continuing IV antibiotics.  Redosed with Diuril.  8/13/2023.  -Hematuria resolved.  Suspect was traumatic due to agitation and pulling at lines.            Edited by: Milton Urban,  at 8/13/2023 1231       DVT Prophylaxis: Lovenox treatment dose  Code Status:   Code Status and Medical Interventions:   Ordered at: 08/07/23 2023     Code Status (Patient has no pulse and is not breathing):    CPR (Attempt to Resuscitate)     Medical Interventions (Patient has pulse or is breathing):    Full Support     Release to patient:    Routine Release      Diet:   Dietary Orders (From admission, onward)       Start     Ordered    08/07/23 2023  NPO Diet NPO Type: Strict NPO  Diet Effective Now        Question:  NPO Type  Answer:  Strict NPO     08/07/23 2023                   Discharge Plan: Disposition: Anticipate short-term rehab discharge in 3 days    Milton Urban DO  08/13/23  12:31 EDT    Dictated utilizing Dragon dictation.

## 2023-08-13 NOTE — PLAN OF CARE
Goal Outcome Evaluation:           Progress: no change  Outcome Evaluation: Amio and precedex gtt continued per order. VSS while kept on BIPAP for most of the night. NG to LIWS, external catheter in place. UOP has improved since given lasix. PRN medications given throughout shift with multiple attempts to use alternative methods for comfort and pain relief. Patient appears in pain most of the time. Pt does not seem to tolerate being off BIPAP, begis to spiral into axiousness and SOB. will continue to monitor.

## 2023-08-13 NOTE — NURSING NOTE
"2330: pt requested water swabs, CPAP mask was removed slightly to give access to mouth. Pt began to become hyper anxious and began saying \" I need to sit up.\" Pts mask was reapplied and comfort measures were given to patient with instruction to deep breathe and to focus on breathes.  Pt voiced wanting to take mask of describing \"I can't breathe\" but was instructing the patient to try and focus on breathing with the machine. Pt became diaphorteitc, tachycardic in the 160s, and BPS 250s/120s. Ativan per MAR was administered shortly after multiple failed attempts to ease patient but all unsuccessful. Pt quickly became calmand appeared sleeping after ativan was administer through CVC access. BP became normotensive, breathing became normal but heart rate remained tachycardic. Dr. Rojas was contacted and was let know of the events that took place. 500cc bolus of NS was ordered and administered for pressure support. UOP has been adequate throughout shift and will continue to monitor. Pt otherwise remained stable.   "

## 2023-08-13 NOTE — PROGRESS NOTES
"DOS: 2023  NAME: Gabi Dudley   : 1945  PCP: Paul Quinteros MD  Chief Complaint   Patient presents with    Abdominal Pain       Chief complaint: Patient currently on the BiPAP device to help with her breathing problems.  Subjective: Earlier she had interacted with her daughter at the bedside without any issues seems to be improving neurologically.    Objective:  Vital signs: /75   Pulse 73   Temp 97.6 øF (36.4 øC) (Temporal)   Resp 23   Ht 165.1 cm (65\")   Wt 77.2 kg (170 lb 3.1 oz)   SpO2 98%   BMI 28.32 kg/mý    Gen: NAD, vitals reviewed  MS: Unable to assess at this time because she is on the BiPAP to help her sleep and she got a dose of Haldol and Benadryl.  CN: Pupils are equally reacting to light and accommodation.  Extraocular muscles are intact.  Facial muscles seem to be intact.  Grimaces symmetrically to painful stimuli.  Motor: Moves all her extremities.  Sensory: Withdraws to painful stimuli.  Coordination: Unable to test at this time.  Gait: Not weightbearing at this time.    ROS:  General: Patient had respiratory distress for which reason she is currently being followed closely by the intensivist.  Neurological: No focal neurological deficits noted.    Laboratory results:  Lab Results   Component Value Date    GLUCOSE 150 (H) 2023    CALCIUM 8.2 (L) 2023     (H) 2023    K 3.3 (L) 2023    CO2 30.7 (H) 2023     2023    BUN 31 (H) 2023    CREATININE 0.72 2023    EGFRIFAFRI 80 2021    EGFRIFNONA 98 2021    BCR 43.1 (H) 2023    ANIONGAP 13.3 2023     Lab Results   Component Value Date    WBC 5.17 2023    HGB 9.6 (L) 2023    HCT 32.5 (L) 2023    MCV 81.7 2023     (L) 2023     Lab Results   Component Value Date    LDL 74 2022    LDL 76 2021    LDL 63 10/27/2020            Review of labs: Potassium is low at 3.3 and the hemoglobin is low at 9.6 and " hematocrit is low at 32.5 with a platelet count being low at 133,000.     CMP:        Lab 08/13/23  0352 08/13/23  0139 08/12/23  1541 08/12/23  0412 08/11/23  1533 08/11/23  0359 08/10/23  0342 08/08/23  0523 08/07/23  1535   SODIUM 150*  --  148* 151* 151* 151* 151*   < > 145   POTASSIUM 3.3*  --  3.9 4.3  4.3 3.6 3.9 3.8   < > 4.4   CHLORIDE 106  --  112* 111* 109* 108* 107   < > 102   CO2 30.7*  --  28.2 35.5* 37.9* 38.3* 37.1*   < > 34.8*   ANION GAP 13.3  --  7.8 4.5* 4.1* 4.7* 6.9   < > 8.2   BUN 31*  --  34* 40* 38* 37* 30*   < > 23   CREATININE 0.72  --  0.50* 0.58 0.48* 0.64 0.72   < > 0.65   EGFR 86.8  --  97.3 93.9 98.3 91.7 86.8   < > 91.4   GLUCOSE 150*  --  159* 145* 156* 139* 155*   < > 142*   CALCIUM 8.2*  --  6.6* 7.1* 7.3* 7.4* 7.7*   < > 9.2   MAGNESIUM  --   --   --  2.3  --   --   --   --   --    PHOSPHORUS  --  4.7* 1.5* 0.3*  --   --   --   --   --    TOTAL PROTEIN 5.3*  --   --  5.0*  --  5.7* 5.8*  --  6.9   ALBUMIN 3.2*  --   --  2.9*  --  3.4* 3.6  --  4.3   GLOBULIN 2.1  --   --  2.1  --  2.3 2.2  --  2.6   ALT (SGPT) 21  --   --  16  --  24 9  --  12   AST (SGOT) 19  --   --  14  --  28 14  --  19   BILIRUBIN 0.4  --   --  0.2  --  0.2 0.3  --  0.3   ALK PHOS 53  --   --  42  --  53 45  --  59   LIPASE  --   --   --   --   --   --   --   --  23    < > = values in this interval not displayed.                  Lab Results   Component Value Date    LINAHAWE34 433 08/13/2023       Lab Results   Component Value Date    FOLATE 14.90 08/13/2023       Lab Results   Component Value Date    HGBA1C 6.20 (H) 01/09/2017           Review and interpretation of imaging: CT scan of the soft tissues of the neck shows the following:    COMPARISON: None.     FINDINGS: A feeding tube and right jugular deep line are present. The  nasopharynx and oropharynx have an unremarkable appearance. The  epiglottis has an unremarkable appearance. The hypopharynx has an  unremarkable appearance. There is some motion at  the level of the larynx  which otherwise appears normal. The thyroid gland is unremarkable.  Moderate bilateral carotid artery calcifications are noted. Mild mucosal  thickening is seen of the right sphenoid sinus. Moderate degenerative  changes are noted of the cervical spine.        IMPRESSION:  No CT evidence of epiglottitis or tonsillitis identified..    CT Head Without Contrast    Result Date: 8/11/2023  PROCEDURE: CT HEAD WO CONTRAST-  HISTORY: Mental status change, unknown cause; K56.600-Partial intestinal obstruction, unspecified as to cause; I48.91-Unspecified atrial fibrillation  COMPARISON: None.  TECHNIQUE: Multiple axial CT images were performed from the foramen magnum to the vertex. Individualized dose reduction techniques using automated exposure control or adjustment of mA and/or kV according to the patient size were employed.  FINDINGS: There is moderate, age-appropriate generalized cerebral atrophy. The ventricles are enlarged, appropriate for atrophy. There is mild asymmetry of the frontal horns of the lateral ventricles. Mild small vessel ischemic disease noted. There is no evidence of edema or hemorrhage.  No masses are identified. No extra-axial fluid is seen. The paranasal sinuses are unremarkable.      Impression: Atrophy  without acute process.       This report was signed and finalized on 8/11/2023 9:49 AM by Angeles Collins MD.         MRI Brain Without Contrast    Result Date: 8/11/2023  FINAL REPORT TECHNIQUE: Multiplanar imaging was performed of the brain without gadolinium infusion. CLINICAL HISTORY: Acute right MCA stroke, AMS, RECENT SURGERY FOR SBO. FINDINGS: Diffusion-weighted images show no evidence of acute infarction. The ventricles are dilated, but proportionate to the degree of generalized atrophy. Periventricular and deep white matter signal abnormality is consistent with changes of chronic small vessel ischemia. There is no mass or shift of midline structures. There is no  intracranial hemorrhage. No significant sinus or osseous abnormality is seen. Appropriate signal flow voids are seen in the major intracranial vessels on T2-weighted images.     Impression: No acute intracranial abnormality. Specifically, no infarction is identified on diffusion-weighted imaging. Authenticated and Electronically Signed by Fidel Knapp M.D. on 08/11/2023 07:50:48 PM      Workup to date: CT of the chest without contrast showed the following:    FINDINGS: Axial CT images of the chest were obtained without contrast.  Coronal and sagittal reformatted images were also obtained. This study  was performed with techniques to keep radiation doses as low as  reasonably achievable, (ALARA). Individualized dose reduction techniques  using automated exposure control or adjustment of mA and/or kV according  to the patient size were employed.     A right jugular deep line and feeding tube are present. There is no  evidence of mediastinal mass or adenopathy. No axillary mass or  adenopathy is identified. Mild emphysema is noted. There are new small  nodular opacities in the posterior left upper lobe which are likely  inflammatory. Small bilateral pleural effusions are seen with mild  bibasilar atelectasis. No chest wall abnormality is identified. Limited  images of the upper abdomen reveal postoperative changes from  cholecystectomy. There are several small nonobstructing renal stones. A  5.5 cm cystic mass is seen at the lateral aspect of the right kidney  with calcification in its wall. A mildly complicated cyst is favored.  There is mild anasarca.        IMPRESSION:  New small nodular opacities in the posterior left upper lobe  which are likely inflammatory.     Small pleural effusions with mild bibasilar atelectasis.     Cystic lateral right renal mass, favor a mildly complicated cyst.    Results for orders placed in visit on 11/02/22    Adult Transthoracic Echo Complete W/ Cont if Necessary Per  Protocol    Interpretation Summary  1.  Normal left ventricular size and systolic function, LVEF 60-65%.  2.  Mild concentric LVH.  3.  Normal LV diastolic filling pattern.  4.  Normal right ventricular size and systolic function.  5.  Mildly increased left atrial volume index.  6.  No significant valvular abnormalities.       Diagnoses: Patient with metabolic encephalopathy causing mental status changes as well as strokelike symptoms.      Comment: I reviewed the MRI films and details on the PACS along with the patient's daughter at the bedside and it was discussed that she did not have any acute stroke.    Plan:  1.  To correct underlying metabolic encephalopathy.  2.  To address the ventilatory decompensation at this time.  3.  Patient also has pleural effusions which are being addressed by the hospitalist and intensivist.  4.  To avoid sedatives and narcotics as much as possible.    Discussed with the patient's daughter at the bedside as well as the primary team and I do not have any further suggestions at this time and will be signing off.    Spent a total of 30 minutes in face-to-face evaluation and management of the patient using the dedicated telemedicine device without any interruption with the help of the rounding nurse with the patient located at the [facility] and myself at a remote location.    Electronically signed by Jamal Simon MD, 08/13/23, 12:49 PM EDT.

## 2023-08-13 NOTE — PROGRESS NOTES
LOS: 4 days   Patient Care Team:  Paul Quinteros MD as PCP - General (Internal Medicine)  Sima Moses MD as Consulting Physician (General Surgery)  Taiwo Curry MD as Consulting Physician (Cardiology)  Soledad Stauffer, EFRAÍN as Ambulatory  ([facility])           HPI: Patient recently had a dose of sedation and is only slightly arousable.    ROS:    Vital Signs  Temp:  [97 øF (36.1 øC)-99.1 øF (37.3 øC)] 97.7 øF (36.5 øC)  Heart Rate:  [] 66  Resp:  [17-35] 21  BP: (107-189)/() 144/75    Physical Exam:     General Appearance:  Alert and cooperative, not in any acute distress.   Cardiovascular/Heart:  Normal S1 and S2,    GI/Abdomen:   Soft with mild distention.  Few bowel sounds are audible.  No significant tenderness.  Wound without evidence of infection.                Results Review:       Lab Results (last 24 hours)       Procedure Component Value Units Date/Time    Basic Metabolic Panel [[id number]]  (Abnormal) Collected: 08/13/23 1350    Specimen: Blood Updated: 08/13/23 1413     Glucose 150 mg/dL      BUN 31 mg/dL      Creatinine 0.58 mg/dL      Sodium 149 mmol/L      Potassium 3.4 mmol/L      Chloride 110 mmol/L      CO2 28.7 mmol/L      Calcium 7.6 mg/dL      BUN/Creatinine Ratio 53.4     Anion Gap 10.3 mmol/L      eGFR 93.9 mL/min/1.73     Narrative:      GFR Normal >60  Chronic Kidney Disease <60  Kidney Failure <15    The GFR formula is only valid for adults with stable renal function between ages 18 and 70.    Folate [[id number]]  (Normal) Collected: 08/13/23 0352    Specimen: Blood Updated: 08/13/23 1233     Folate 14.90 ng/mL     Narrative:      Results may be falsely increased if patient taking Biotin.      Vitamin B12 [[id number]]  (Normal) Collected: 08/13/23 0352    Specimen: Blood Updated: 08/13/23 1233     Vitamin B-12 433 pg/mL     Narrative:      Results may be falsely increased if patient taking Biotin.      Blood Gas, Arterial With Co-Ox [[id number]]   (Abnormal) Collected: 08/13/23 1030    Specimen: Arterial Blood Updated: 08/13/23 1030     Site Arterial Line     Glenn's Test N/A     pH, Arterial 7.388 pH units      pCO2, Arterial 49.9 mm Hg      Comment: 83 Value above reference range        pO2, Arterial 112.0 mm Hg      Comment: 83 Value above reference range        HCO3, Arterial 30.1 mmol/L      Comment: 83 Value above reference range        Base Excess, Arterial 4.2 mmol/L      Comment: 83 Value above reference range        O2 Saturation, Arterial 97.0 %      Hematocrit, Blood Gas 33.0 %      Comment: 84 Value below reference range        Oxyhemoglobin 96.0 %      Methemoglobin 0.60 %      Carboxyhemoglobin 0.4 %      A-a DO2 38.8 mmHg      Barometric Pressure for Blood Gas 734 mmHg      Modality BiPap     FIO2 30 %      Ventilator Mode NA     Comment: Meter: [id number]     :  [id number]        Note --     pH, Temp Corrected --     pCO2, Temperature Corrected --     pO2, Temperature Corrected --    Comprehensive Metabolic Panel [[id number]]  (Abnormal) Collected: 08/13/23 0352    Specimen: Blood Updated: 08/13/23 0623     Glucose 150 mg/dL      BUN 31 mg/dL      Creatinine 0.72 mg/dL      Sodium 150 mmol/L      Potassium 3.3 mmol/L      Chloride 106 mmol/L      CO2 30.7 mmol/L      Calcium 8.2 mg/dL      Total Protein 5.3 g/dL      Albumin 3.2 g/dL      ALT (SGPT) 21 U/L      AST (SGOT) 19 U/L      Alkaline Phosphatase 53 U/L      Total Bilirubin 0.4 mg/dL      Globulin 2.1 gm/dL      A/G Ratio 1.5 g/dL      BUN/Creatinine Ratio 43.1     Anion Gap 13.3 mmol/L      eGFR 86.8 mL/min/1.73     Narrative:      GFR Normal >60  Chronic Kidney Disease <60  Kidney Failure <15    The GFR formula is only valid for adults with stable renal function between ages 18 and 70.    CBC (No Diff) [[id number]]  (Abnormal) Collected: 08/13/23 0352    Specimen: Blood Updated: 08/13/23 0601     WBC 5.17 10*3/mm3      RBC 3.98 10*6/mm3      Hemoglobin 9.6 g/dL       Hematocrit 32.5 %      MCV 81.7 fL      MCH 24.1 pg      MCHC 29.5 g/dL      RDW 19.9 %      RDW-SD 58.8 fl      MPV 11.4 fL      Platelets 133 10*3/mm3     Blood Gas, Arterial With Co-Ox [[id number]]  (Abnormal) Collected: 08/13/23 0339    Specimen: Arterial Blood Updated: 08/13/23 0339     Site Arterial Line     Glenn's Test N/A     pH, Arterial 7.428 pH units      pCO2, Arterial 45.0 mm Hg      pO2, Arterial 107.0 mm Hg      HCO3, Arterial 29.7 mmol/L      Comment: 83 Value above reference range        Base Excess, Arterial 4.8 mmol/L      Comment: 83 Value above reference range        O2 Saturation, Arterial 97.3 %      Hematocrit, Blood Gas 30.2 %      Comment: 84 Value below reference range        Oxyhemoglobin 96.4 %      Methemoglobin 0.40 %      Carboxyhemoglobin 0.5 %      A-a DO2 48.6 mmHg      Barometric Pressure for Blood Gas 732 mmHg      Modality BiPap     FIO2 30 %      Ventilator Mode AVAP     Note --     Collected by      Comment: Meter: [id number]     :  [id number]        pH, Temp Corrected --     pCO2, Temperature Corrected --     pO2, Temperature Corrected --    Phosphorus [[id number]]  (Abnormal) Collected: 08/13/23 0139    Specimen: Blood Updated: 08/13/23 0205     Phosphorus 4.7 mg/dL     Basic Metabolic Panel [[id number]]  (Abnormal) Collected: 08/12/23 1541    Specimen: Blood, Arterial Line Updated: 08/12/23 1700     Glucose 159 mg/dL      BUN 34 mg/dL      Creatinine 0.50 mg/dL      Sodium 148 mmol/L      Potassium 3.9 mmol/L      Chloride 112 mmol/L      CO2 28.2 mmol/L      Calcium 6.6 mg/dL      BUN/Creatinine Ratio 68.0     Anion Gap 7.8 mmol/L      eGFR 97.3 mL/min/1.73     Narrative:      GFR Normal >60  Chronic Kidney Disease <60  Kidney Failure <15    The GFR formula is only valid for adults with stable renal function between ages 18 and 70.    Phosphorus [[id number]]  (Abnormal) Collected: 08/12/23 1541    Specimen: Blood, Arterial Line Updated: 08/12/23 4056      Phosphorus 1.5 mg/dL     Hemoglobin & Hematocrit, Blood [[id number]]  (Abnormal) Collected: 08/12/23 1541    Specimen: Blood, Arterial Line Updated: 08/12/23 1616     Hemoglobin 9.2 g/dL      Hematocrit 32.3 %                 Assessment & Plan       Partial small bowel obstruction    Patient stable from a surgical standpoint.  Having issues with significant agitation requiring medication.  Continuing to await bowel function return.  Continue NG tube for now.    Gabe Bowden MD  08/13/23  15:58 EDT

## 2023-08-14 LAB
ALBUMIN SERPL-MCNC: 3.5 G/DL (ref 3.5–5.2)
ALBUMIN/GLOB SERPL: 1.8 G/DL
ALP SERPL-CCNC: 48 U/L (ref 39–117)
ALT SERPL W P-5'-P-CCNC: 22 U/L (ref 1–33)
ANION GAP SERPL CALCULATED.3IONS-SCNC: 9.4 MMOL/L (ref 5–15)
AST SERPL-CCNC: 21 U/L (ref 1–32)
BILIRUB SERPL-MCNC: 0.4 MG/DL (ref 0–1.2)
BUN SERPL-MCNC: 30 MG/DL (ref 8–23)
BUN/CREAT SERPL: 50.8 (ref 7–25)
CALCIUM SPEC-SCNC: 7.8 MG/DL (ref 8.6–10.5)
CHLORIDE SERPL-SCNC: 107 MMOL/L (ref 98–107)
CO2 SERPL-SCNC: 31.6 MMOL/L (ref 22–29)
CREAT SERPL-MCNC: 0.59 MG/DL (ref 0.57–1)
DEPRECATED RDW RBC AUTO: 62.5 FL (ref 37–54)
EGFRCR SERPLBLD CKD-EPI 2021: 93.5 ML/MIN/1.73
ERYTHROCYTE [DISTWIDTH] IN BLOOD BY AUTOMATED COUNT: 20.9 % (ref 12.3–15.4)
GLOBULIN UR ELPH-MCNC: 1.9 GM/DL
GLUCOSE SERPL-MCNC: 125 MG/DL (ref 65–99)
HCT VFR BLD AUTO: 32.3 % (ref 34–46.6)
HGB BLD-MCNC: 9.3 G/DL (ref 12–15.9)
MCH RBC QN AUTO: 23.6 PG (ref 26.6–33)
MCHC RBC AUTO-ENTMCNC: 28.8 G/DL (ref 31.5–35.7)
MCV RBC AUTO: 82 FL (ref 79–97)
PLATELET # BLD AUTO: 172 10*3/MM3 (ref 140–450)
PMV BLD AUTO: 11.8 FL (ref 6–12)
POTASSIUM SERPL-SCNC: 3.4 MMOL/L (ref 3.5–5.2)
POTASSIUM SERPL-SCNC: 3.4 MMOL/L (ref 3.5–5.2)
POTASSIUM SERPL-SCNC: 3.6 MMOL/L (ref 3.5–5.2)
PROT SERPL-MCNC: 5.4 G/DL (ref 6–8.5)
RBC # BLD AUTO: 3.94 10*6/MM3 (ref 3.77–5.28)
SODIUM SERPL-SCNC: 148 MMOL/L (ref 136–145)
VANCOMYCIN SERPL-MCNC: 10.1 MCG/ML (ref 5–40)
WBC NRBC COR # BLD: 8.02 10*3/MM3 (ref 3.4–10.8)

## 2023-08-14 PROCEDURE — 25010000002 LORAZEPAM PER 2 MG: Performed by: INTERNAL MEDICINE

## 2023-08-14 PROCEDURE — 25010000002 HYDRALAZINE PER 20 MG: Performed by: INTERNAL MEDICINE

## 2023-08-14 PROCEDURE — 25010000002 DIPHENHYDRAMINE PER 50 MG: Performed by: INTERNAL MEDICINE

## 2023-08-14 PROCEDURE — 25010000002 ZIPRASIDONE MESYLATE PER 10 MG: Performed by: INTERNAL MEDICINE

## 2023-08-14 PROCEDURE — 25010000002 PIPERACILLIN SOD-TAZOBACTAM PER 1 G: Performed by: INTERNAL MEDICINE

## 2023-08-14 PROCEDURE — 25010000002 VANCOMYCIN 1 G RECONSTITUTED SOLUTION: Performed by: INTERNAL MEDICINE

## 2023-08-14 PROCEDURE — 94760 N-INVAS EAR/PLS OXIMETRY 1: CPT

## 2023-08-14 PROCEDURE — 94799 UNLISTED PULMONARY SVC/PX: CPT

## 2023-08-14 PROCEDURE — 99233 SBSQ HOSP IP/OBS HIGH 50: CPT | Performed by: INTERNAL MEDICINE

## 2023-08-14 PROCEDURE — 99024 POSTOP FOLLOW-UP VISIT: CPT | Performed by: STUDENT IN AN ORGANIZED HEALTH CARE EDUCATION/TRAINING PROGRAM

## 2023-08-14 PROCEDURE — 99291 CRITICAL CARE FIRST HOUR: CPT | Performed by: INTERNAL MEDICINE

## 2023-08-14 PROCEDURE — 84132 ASSAY OF SERUM POTASSIUM: CPT | Performed by: INTERNAL MEDICINE

## 2023-08-14 PROCEDURE — 25010000002 ENOXAPARIN PER 10 MG: Performed by: INTERNAL MEDICINE

## 2023-08-14 PROCEDURE — 25010000002 AMIODARONE IN DEXTROSE 5% 360-4.14 MG/200ML-% SOLUTION: Performed by: INTERNAL MEDICINE

## 2023-08-14 PROCEDURE — 0 POTASSIUM CHLORIDE PER 2 MEQ: Performed by: INTERNAL MEDICINE

## 2023-08-14 PROCEDURE — 94761 N-INVAS EAR/PLS OXIMETRY MLT: CPT

## 2023-08-14 PROCEDURE — 25010000002 LABETALOL 5 MG/ML SOLUTION: Performed by: INTERNAL MEDICINE

## 2023-08-14 PROCEDURE — 25010000002 METHYLPREDNISOLONE PER 40 MG: Performed by: INTERNAL MEDICINE

## 2023-08-14 PROCEDURE — 85027 COMPLETE CBC AUTOMATED: CPT | Performed by: INTERNAL MEDICINE

## 2023-08-14 PROCEDURE — 94660 CPAP INITIATION&MGMT: CPT

## 2023-08-14 PROCEDURE — 25010000002 HYDROMORPHONE 1 MG/ML SOLUTION: Performed by: INTERNAL MEDICINE

## 2023-08-14 PROCEDURE — 80053 COMPREHEN METABOLIC PANEL: CPT | Performed by: INTERNAL MEDICINE

## 2023-08-14 PROCEDURE — 25010000002 CHLOROTHIAZIDE PER 500 MG: Performed by: INTERNAL MEDICINE

## 2023-08-14 PROCEDURE — 80202 ASSAY OF VANCOMYCIN: CPT | Performed by: INTERNAL MEDICINE

## 2023-08-14 RX ORDER — CHOLECALCIFEROL (VITAMIN D3) 125 MCG
5 CAPSULE ORAL NIGHTLY PRN
Status: DISCONTINUED | OUTPATIENT
Start: 2023-08-14 | End: 2023-08-16

## 2023-08-14 RX ORDER — POLYETHYLENE GLYCOL 3350 17 G/17G
17 POWDER, FOR SOLUTION ORAL DAILY PRN
Status: DISCONTINUED | OUTPATIENT
Start: 2023-08-14 | End: 2023-08-16

## 2023-08-14 RX ORDER — TRAZODONE HYDROCHLORIDE 50 MG/1
100 TABLET ORAL NIGHTLY
Status: DISCONTINUED | OUTPATIENT
Start: 2023-08-14 | End: 2023-08-16

## 2023-08-14 RX ORDER — CHLOROTHIAZIDE SODIUM 500 MG/1
500 INJECTION INTRAVENOUS DAILY
Status: COMPLETED | OUTPATIENT
Start: 2023-08-14 | End: 2023-08-16

## 2023-08-14 RX ORDER — METHYLPREDNISOLONE SODIUM SUCCINATE 125 MG/2ML
60 INJECTION, POWDER, LYOPHILIZED, FOR SOLUTION INTRAMUSCULAR; INTRAVENOUS DAILY
Status: DISCONTINUED | OUTPATIENT
Start: 2023-08-15 | End: 2023-08-16

## 2023-08-14 RX ORDER — ACETAMINOPHEN 325 MG/1
650 TABLET ORAL EVERY 4 HOURS PRN
Status: DISCONTINUED | OUTPATIENT
Start: 2023-08-14 | End: 2023-08-16

## 2023-08-14 RX ORDER — METOPROLOL TARTRATE 5 MG/5ML
5 INJECTION INTRAVENOUS EVERY 6 HOURS PRN
Status: DISCONTINUED | OUTPATIENT
Start: 2023-08-14 | End: 2023-08-18 | Stop reason: HOSPADM

## 2023-08-14 RX ORDER — METOPROLOL SUCCINATE 25 MG/1
25 TABLET, EXTENDED RELEASE ORAL DAILY
Status: DISCONTINUED | OUTPATIENT
Start: 2023-08-14 | End: 2023-08-14

## 2023-08-14 RX ORDER — AMIODARONE HYDROCHLORIDE 200 MG/1
200 TABLET ORAL
Status: DISCONTINUED | OUTPATIENT
Start: 2023-08-14 | End: 2023-08-16

## 2023-08-14 RX ORDER — DILTIAZEM HYDROCHLORIDE 120 MG/1
240 CAPSULE, COATED, EXTENDED RELEASE ORAL DAILY
Status: DISCONTINUED | OUTPATIENT
Start: 2023-08-14 | End: 2023-08-14

## 2023-08-14 RX ORDER — CLONAZEPAM 0.5 MG/1
0.5 TABLET ORAL DAILY
Status: DISCONTINUED | OUTPATIENT
Start: 2023-08-14 | End: 2023-08-16

## 2023-08-14 RX ORDER — PROCHLORPERAZINE 25 MG
25 SUPPOSITORY, RECTAL RECTAL EVERY 12 HOURS PRN
Status: DISCONTINUED | OUTPATIENT
Start: 2023-08-14 | End: 2023-08-16

## 2023-08-14 RX ORDER — DULOXETIN HYDROCHLORIDE 30 MG/1
60 CAPSULE, DELAYED RELEASE ORAL DAILY
Status: DISCONTINUED | OUTPATIENT
Start: 2023-08-14 | End: 2023-08-18 | Stop reason: HOSPADM

## 2023-08-14 RX ORDER — PROCHLORPERAZINE EDISYLATE 5 MG/ML
5 INJECTION INTRAMUSCULAR; INTRAVENOUS EVERY 6 HOURS PRN
Status: DISCONTINUED | OUTPATIENT
Start: 2023-08-14 | End: 2023-08-16

## 2023-08-14 RX ORDER — QUETIAPINE FUMARATE 25 MG/1
12.5 TABLET, FILM COATED ORAL NIGHTLY
Status: DISCONTINUED | OUTPATIENT
Start: 2023-08-14 | End: 2023-08-16

## 2023-08-14 RX ORDER — PROCHLORPERAZINE MALEATE 10 MG
5 TABLET ORAL EVERY 6 HOURS PRN
Status: DISCONTINUED | OUTPATIENT
Start: 2023-08-14 | End: 2023-08-16

## 2023-08-14 RX ORDER — POTASSIUM CHLORIDE 29.8 MG/ML
20 INJECTION INTRAVENOUS
Status: COMPLETED | OUTPATIENT
Start: 2023-08-14 | End: 2023-08-14

## 2023-08-14 RX ORDER — BISACODYL 10 MG
10 SUPPOSITORY, RECTAL RECTAL DAILY PRN
Status: DISCONTINUED | OUTPATIENT
Start: 2023-08-14 | End: 2023-08-16

## 2023-08-14 RX ORDER — DOCUSATE SODIUM 50 MG/5 ML
100 LIQUID (ML) ORAL 2 TIMES DAILY
Status: DISCONTINUED | OUTPATIENT
Start: 2023-08-14 | End: 2023-08-16

## 2023-08-14 RX ORDER — ACETAMINOPHEN 160 MG/5ML
650 SOLUTION ORAL EVERY 4 HOURS PRN
Status: DISCONTINUED | OUTPATIENT
Start: 2023-08-14 | End: 2023-08-16

## 2023-08-14 RX ORDER — POTASSIUM CHLORIDE 1.5 G/1.58G
40 POWDER, FOR SOLUTION ORAL EVERY 4 HOURS
Status: COMPLETED | OUTPATIENT
Start: 2023-08-14 | End: 2023-08-14

## 2023-08-14 RX ORDER — ACETAMINOPHEN 650 MG/1
650 SUPPOSITORY RECTAL EVERY 4 HOURS PRN
Status: DISCONTINUED | OUTPATIENT
Start: 2023-08-14 | End: 2023-08-16

## 2023-08-14 RX ADMIN — Medication 2 SPRAY: at 15:03

## 2023-08-14 RX ADMIN — LABETALOL HYDROCHLORIDE 10 MG: 5 INJECTION, SOLUTION INTRAVENOUS at 01:29

## 2023-08-14 RX ADMIN — PIPERACILLIN SODIUM AND TAZOBACTAM SODIUM 3.38 G: 3; .375 INJECTION, SOLUTION INTRAVENOUS at 23:51

## 2023-08-14 RX ADMIN — FLUTICASONE PROPIONATE 2 SPRAY: 50 SPRAY, METERED NASAL at 20:47

## 2023-08-14 RX ADMIN — METHYLPREDNISOLONE SODIUM SUCCINATE 40 MG: 40 INJECTION, POWDER, FOR SOLUTION INTRAMUSCULAR; INTRAVENOUS at 09:23

## 2023-08-14 RX ADMIN — ENOXAPARIN SODIUM 70 MG: 100 INJECTION SUBCUTANEOUS at 09:23

## 2023-08-14 RX ADMIN — Medication 10 ML: at 09:24

## 2023-08-14 RX ADMIN — HYDRALAZINE HYDROCHLORIDE 10 MG: 20 INJECTION, SOLUTION INTRAMUSCULAR; INTRAVENOUS at 05:12

## 2023-08-14 RX ADMIN — POTASSIUM CHLORIDE 20 MEQ: 400 INJECTION, SOLUTION INTRAVENOUS at 10:11

## 2023-08-14 RX ADMIN — DIPHENHYDRAMINE HYDROCHLORIDE 50 MG: 50 INJECTION INTRAMUSCULAR; INTRAVENOUS at 11:33

## 2023-08-14 RX ADMIN — ENOXAPARIN SODIUM 70 MG: 100 INJECTION SUBCUTANEOUS at 20:46

## 2023-08-14 RX ADMIN — ARFORMOTEROL TARTRATE 15 MCG: 15 SOLUTION RESPIRATORY (INHALATION) at 07:00

## 2023-08-14 RX ADMIN — DIPHENHYDRAMINE HYDROCHLORIDE 50 MG: 50 INJECTION INTRAMUSCULAR; INTRAVENOUS at 16:17

## 2023-08-14 RX ADMIN — SERTRALINE 100 MG: 50 TABLET, FILM COATED ORAL at 20:46

## 2023-08-14 RX ADMIN — AMIODARONE HYDROCHLORIDE 200 MG: 200 TABLET ORAL at 17:55

## 2023-08-14 RX ADMIN — BUDESONIDE 1 MG: 0.5 INHALANT RESPIRATORY (INHALATION) at 07:00

## 2023-08-14 RX ADMIN — ARFORMOTEROL TARTRATE 15 MCG: 15 SOLUTION RESPIRATORY (INHALATION) at 20:00

## 2023-08-14 RX ADMIN — HYDROMORPHONE HYDROCHLORIDE 1 MG: 1 INJECTION, SOLUTION INTRAMUSCULAR; INTRAVENOUS; SUBCUTANEOUS at 05:12

## 2023-08-14 RX ADMIN — PIPERACILLIN SODIUM AND TAZOBACTAM SODIUM 3.38 G: 3; .375 INJECTION, SOLUTION INTRAVENOUS at 16:17

## 2023-08-14 RX ADMIN — BUDESONIDE 1 MG: 0.5 INHALANT RESPIRATORY (INHALATION) at 20:00

## 2023-08-14 RX ADMIN — SODIUM CHLORIDE 1000 MG: 900 INJECTION, SOLUTION INTRAVENOUS at 05:13

## 2023-08-14 RX ADMIN — DIPHENHYDRAMINE HYDROCHLORIDE 50 MG: 50 INJECTION INTRAMUSCULAR; INTRAVENOUS at 05:12

## 2023-08-14 RX ADMIN — IPRATROPIUM BROMIDE AND ALBUTEROL SULFATE 3 ML: 2.5; .5 SOLUTION RESPIRATORY (INHALATION) at 12:11

## 2023-08-14 RX ADMIN — METHYLPREDNISOLONE SODIUM SUCCINATE 40 MG: 40 INJECTION, POWDER, FOR SOLUTION INTRAMUSCULAR; INTRAVENOUS at 02:59

## 2023-08-14 RX ADMIN — POTASSIUM CHLORIDE 20 MEQ: 400 INJECTION, SOLUTION INTRAVENOUS at 08:50

## 2023-08-14 RX ADMIN — Medication 2 SPRAY: at 20:47

## 2023-08-14 RX ADMIN — IPRATROPIUM BROMIDE AND ALBUTEROL SULFATE 3 ML: 2.5; .5 SOLUTION RESPIRATORY (INHALATION) at 07:00

## 2023-08-14 RX ADMIN — Medication 10 ML: at 20:47

## 2023-08-14 RX ADMIN — NYSTATIN 500000 UNITS: 100000 SUSPENSION ORAL at 23:01

## 2023-08-14 RX ADMIN — Medication 10 ML: at 14:25

## 2023-08-14 RX ADMIN — METOPROLOL TARTRATE 5 MG: 1 INJECTION, SOLUTION INTRAVENOUS at 14:25

## 2023-08-14 RX ADMIN — METOPROLOL TARTRATE 5 MG: 1 INJECTION, SOLUTION INTRAVENOUS at 02:59

## 2023-08-14 RX ADMIN — DILTIAZEM HYDROCHLORIDE 60 MG: 30 TABLET, FILM COATED ORAL at 23:51

## 2023-08-14 RX ADMIN — POTASSIUM CHLORIDE 40 MEQ: 1.5 POWDER, FOR SOLUTION ORAL at 20:48

## 2023-08-14 RX ADMIN — TRAZODONE HYDROCHLORIDE 100 MG: 50 TABLET ORAL at 20:46

## 2023-08-14 RX ADMIN — METOPROLOL TARTRATE 5 MG: 1 INJECTION, SOLUTION INTRAVENOUS at 09:22

## 2023-08-14 RX ADMIN — FLUTICASONE PROPIONATE 2 SPRAY: 50 SPRAY, METERED NASAL at 09:24

## 2023-08-14 RX ADMIN — QUETIAPINE FUMARATE 12.5 MG: 25 TABLET ORAL at 20:46

## 2023-08-14 RX ADMIN — LORAZEPAM 1 MG: 2 INJECTION INTRAMUSCULAR; INTRAVENOUS at 02:18

## 2023-08-14 RX ADMIN — Medication 10 ML: at 09:23

## 2023-08-14 RX ADMIN — CLONAZEPAM 0.5 MG: 0.5 TABLET ORAL at 11:33

## 2023-08-14 RX ADMIN — IPRATROPIUM BROMIDE AND ALBUTEROL SULFATE 3 ML: 2.5; .5 SOLUTION RESPIRATORY (INHALATION) at 20:00

## 2023-08-14 RX ADMIN — ZIPRASIDONE MESYLATE 10 MG: 20 INJECTION, POWDER, LYOPHILIZED, FOR SOLUTION INTRAMUSCULAR at 04:15

## 2023-08-14 RX ADMIN — LABETALOL HYDROCHLORIDE 10 MG: 5 INJECTION, SOLUTION INTRAVENOUS at 15:41

## 2023-08-14 RX ADMIN — DULOXETINE HYDROCHLORIDE 60 MG: 30 CAPSULE, DELAYED RELEASE ORAL at 11:33

## 2023-08-14 RX ADMIN — METOPROLOL TARTRATE 12.5 MG: 25 TABLET, FILM COATED ORAL at 17:55

## 2023-08-14 RX ADMIN — PIPERACILLIN SODIUM AND TAZOBACTAM SODIUM 3.38 G: 3; .375 INJECTION, SOLUTION INTRAVENOUS at 09:23

## 2023-08-14 RX ADMIN — Medication 10 ML: at 20:46

## 2023-08-14 RX ADMIN — DILTIAZEM HYDROCHLORIDE 60 MG: 30 TABLET, FILM COATED ORAL at 17:54

## 2023-08-14 RX ADMIN — HYDROMORPHONE HYDROCHLORIDE 1 MG: 1 INJECTION, SOLUTION INTRAMUSCULAR; INTRAVENOUS; SUBCUTANEOUS at 00:33

## 2023-08-14 RX ADMIN — AMIODARONE HYDROCHLORIDE 0.5 MG/MIN: 1.8 INJECTION, SOLUTION INTRAVENOUS at 13:30

## 2023-08-14 RX ADMIN — AMIODARONE HYDROCHLORIDE 0.5 MG/MIN: 1.8 INJECTION, SOLUTION INTRAVENOUS at 01:12

## 2023-08-14 RX ADMIN — POTASSIUM CHLORIDE 40 MEQ: 1.5 POWDER, FOR SOLUTION ORAL at 23:51

## 2023-08-14 RX ADMIN — ZIPRASIDONE MESYLATE 10 MG: 20 INJECTION, POWDER, LYOPHILIZED, FOR SOLUTION INTRAMUSCULAR at 23:51

## 2023-08-14 RX ADMIN — CHLOROTHIAZIDE SODIUM 500 MG: 500 INJECTION, POWDER, LYOPHILIZED, FOR SOLUTION INTRAVENOUS at 13:30

## 2023-08-14 RX ADMIN — DIPHENHYDRAMINE HYDROCHLORIDE 50 MG: 50 INJECTION INTRAMUSCULAR; INTRAVENOUS at 23:00

## 2023-08-14 NOTE — PROGRESS NOTES
[facility]    Name:  Gabi Dudley   Age:  78 y.o.  Sex:  female  :  1945  MRN:  [id number]   Visit Number:  [id number]  Admission Date:  2023  Date Of Service:  23  Primary Care Physician:  Paul Quinteros MD     LOS: 5 days :    Chief Complaint:      Anxiety    Subjective:    2023 patient continues to have episodes of agitation please see Dr. Dewitt note from overnight.  Currently comfortable on the BiPAP.  Still with NG output about 500 overnight per Gio the night nurse.  Discussed with Dr. Poe and Dr. Isaac. D/w Farida her daughter. D/w Pharmacy resuming meds. Numerous serotonergic meds. Reducing zoloft dose. Has had bm the last two days.    Hospital course:  2023: Patient had an episode of respiratory distress today.  Was trying to come off the BiPAP.  Had some upper airway sounds concerning for stridor.  Was given medications to help BiPAP synchrony including Haldol Benadryl Ativan.  ABG reviewed.  Following this her heart rate normalized from the 160s to the 80s went from atrial fibrillation back to sinus rhythm.  Went down for a CT which ruled out tracheal obstruction.  Updated Dr. Poe on these changes.  Discussed with family.  2023: Saw patient at the bedside more awake conversant today.  Family had concerns addressed. Dr. Simon saw. MRI reviewed with family at bedside. Patient had increased alertness but also too increased agitation for which I added medication in hopes of controlling this. Not hematuria in martinez bag but good urine output gave 1 u prbc.  2023 patient with disorientation this morning.  Decreased pain medicine dose ordered CT of the head which was negative for acute process.  8/10/2023: Patient converted to sinus rhythm this morning.  Dr. Curry's note reviewed.  Was taken to the OR for adhesiolysis by Dr. Isaac with assist from Dr. Bowden last evening.  Currently n.p.o. with NG tube in place.  2023  patient with RVR overnight was started on Cardizem but that did not help.  Persistent pain discussed with Dr. Isaac taking  to the OR.  Discussed with Dr. Curry who recommended amiodarone and discussed perioperative risk management with Dr. Isaac.  8/8/2023 Pain not well controlled increased morphine dose from 1 mg to 2 mg    History Of Presenting Illness:       Patient is a chronically ill 78-year-old female history significant for hypertension, hyperlipidemia, depression/anxiety and CAD who presents to the emergency room with 3 to 4 days of progressively worsening abdominal pain.  Patient describes the pain as intermittent and sharp, diffuse across her abdomen.  Patient has chronic constipation so cannot recall her last bowel movement.  Admits to some mild nausea, denies vomiting.  Patient does have a history of small bowel obstruction which required colectomy 2018.  States pain is similar but not as severe as previous episode.  Nothing makes it better or worse.  Patient does admit to still passing gas.  Upon arrival to the ER, patient afebrile hemodynamically stable and nonhypoxic.  CMP unremarkable except for glucose 142.  Lactate lipase normal.  WBC 13, hemoglobin hematocrit at baseline, platelets 112.  UA negative for urinary tract infection.  CT abdomen pelvis revealed mid small bowel dilation worrisome for partial small bowel obstruction.  Dr. Isaac agreed to consult hospital service asked to admit.  Edited by: Milton Urban DO at 8/14/2023 1111     Review of Systems:     All systems were reviewed and negative except as mentioned in subjective, assessment and plan.    Vital Signs:    Temp:  [97 øF (36.1 øC)-97.7 øF (36.5 øC)] 97 øF (36.1 øC)  Heart Rate:  [] 76  Resp:  [12-25] 22  BP: (162-207)/() 170/75    Intake and output:    I/O last 3 completed shifts:  In: 2034.3 [I.V.:1806.3; IV Piggyback:228]  Out: 3275 [Urine:2925; Emesis/NG output:350]  I/O this shift:  In: -   Out: 125  [Urine:125]    Physical Examination:    Physical Exam  Vitals reviewed.   Constitutional:       Appearance: Ill-appearing answering questions less agitated today  HENT:      Head: Normocephalic and atraumatic.      Right Ear: External ear normal.      Left Ear: External ear normal.      Mouth/Throat:      Mouth: Mucous membranes are moist.      Pharynx: Oropharynx is clear.   Eyes:      Extraocular Movements: Extraocular movements intact.      Conjunctiva/sclera: Conjunctivae normal.   Cardiovascular:      Rate and Rhythm: Normal rate and regular rhythm.      Pulses: Normal pulses.      Heart sounds: Normal heart sounds.   Pulmonary:      Effort: Pulmonary effort is labored respiratory rate is increased     Breath sounds: audible breath sounds today still diminished, less wheeze  Abdominal:      General: Bowel sounds are improved.  Mild distension.     Comments: No rebound or guarding  Musculoskeletal:         General: Normal range of motion.      Right lower leg: No edema.      Left lower leg: No edema.   Skin:     General: Skin is warm and dry.   Neurological:      Mental Status: continues to improve cognitively  Psychiatric:         Behavior: Behavior appears appropriate given her respiratory distress  Edited by: Milton Urban DO at 8/14/2023 1103     Laboratory results:    Results from last 7 days   Lab Units 08/14/23  0427 08/14/23  0216 08/13/23  1350 08/13/23  0352 08/12/23  1541 08/12/23  0412   SODIUM mmol/L 148*  --  149* 150*   < > 151*   POTASSIUM mmol/L 3.4* 3.4* 3.4* 3.3*   < > 4.3  4.3   CHLORIDE mmol/L 107  --  110* 106   < > 111*   CO2 mmol/L 31.6*  --  28.7 30.7*   < > 35.5*   BUN mg/dL 30*  --  31* 31*   < > 40*   CREATININE mg/dL 0.59  --  0.58 0.72   < > 0.58   CALCIUM mg/dL 7.8*  --  7.6* 8.2*   < > 7.1*   BILIRUBIN mg/dL 0.4  --   --  0.4  --  0.2   ALK PHOS U/L 48  --   --  53  --  42   ALT (SGPT) U/L 22  --   --  21  --  16   AST (SGOT) U/L 21  --   --  19  --  14   GLUCOSE mg/dL  125*  --  150* 150*   < > 145*    < > = values in this interval not displayed.     Results from last 7 days   Lab Units 08/14/23  0427 08/13/23  0352 08/12/23  1541 08/12/23  0412   WBC 10*3/mm3 8.02 5.17  --  3.96   HEMOGLOBIN g/dL 9.3* 9.6* 9.2* 7.3*   HEMATOCRIT % 32.3* 32.5* 32.3* 27.7*   PLATELETS 10*3/mm3 172 133*  --  115*                 Recent Labs     08/12/23  0717 08/13/23  0339 08/13/23  1030   PHART 7.441 7.428 7.388   KRE8XCK 54.5* 45.0 49.9*   PO2ART 131.0* 107.0* 112.0*   PKH0HOX 37.1* 29.7* 30.1*   BASEEXCESS 11.7* 4.8* 4.2*      I have reviewed the patient's laboratory results.    Radiology results:    XR Neck Soft Tissue    Result Date: 8/13/2023  Neck soft tissue  HISTORY: Stridor  FINDINGS: 2 views of the neck were obtained. The base of the neck is obscured by the shoulders on the lateral view. A right jugular deep line is present. A nasogastric tube is also noted. The visualized airway appears normal. The epiglottis appears normal.      Impression: No focal airway abnormality identified.  This report was signed and finalized on 8/13/2023 12:08 PM by Maximo Epps MD.      CT Soft Tissue Neck Without Contrast    Result Date: 8/13/2023  PROCEDURE: CT SOFT TISSUE NECK WO CONTRAST-  HISTORY: Epiglottitis or tonsillitis suspected; K56.600-Partial intestinal obstruction, unspecified as to cause; I48.91-Unspecified atrial fibrillation  TECHNIQUE:  Thin section axial CT images were obtained through the neck without intravenous contrast administration. Sagittal and coronal reformatted images were also obtained. This study was performed with techniques to keep radiation doses as low as reasonably achievable, (ALARA). Individualized dose reduction techniques using automated exposure control or adjustment of mA and/or kV according to the patient size were employed.  COMPARISON: None.  FINDINGS: A feeding tube and right jugular deep line are present. The nasopharynx and oropharynx have an unremarkable  appearance. The epiglottis has an unremarkable appearance. The hypopharynx has an unremarkable appearance. There is some motion at the level of the larynx which otherwise appears normal. The thyroid gland is unremarkable. Moderate bilateral carotid artery calcifications are noted. Mild mucosal thickening is seen of the right sphenoid sinus. Moderate degenerative changes are noted of the cervical spine.       Impression: No CT evidence of epiglottitis or tonsillitis identified..       This report was signed and finalized on 8/13/2023 11:46 AM by Maximo Epps MD.      CT Chest Without Contrast Diagnostic    Result Date: 8/13/2023  PROCEDURE: CT CHEST WO CONTRAST DIAGNOSTIC-  HISTORY: Abnormal xray - lung opacity/opacities; K56.600-Partial intestinal obstruction, unspecified as to cause; I48.91-Unspecified atrial fibrillation  COMPARISON: July 20, 2023 CT  FINDINGS: Axial CT images of the chest were obtained without contrast. Coronal and sagittal reformatted images were also obtained. This study was performed with techniques to keep radiation doses as low as reasonably achievable, (ALARA). Individualized dose reduction techniques using automated exposure control or adjustment of mA and/or kV according to the patient size were employed.  A right jugular deep line and feeding tube are present. There is no evidence of mediastinal mass or adenopathy. No axillary mass or adenopathy is identified. Mild emphysema is noted. There are new small nodular opacities in the posterior left upper lobe which are likely inflammatory. Small bilateral pleural effusions are seen with mild bibasilar atelectasis. No chest wall abnormality is identified. Limited images of the upper abdomen reveal postoperative changes from cholecystectomy. There are several small nonobstructing renal stones. A 5.5 cm cystic mass is seen at the lateral aspect of the right kidney with calcification in its wall. A mildly complicated cyst is favored. There is  mild anasarca.       Impression: New small nodular opacities in the posterior left upper lobe which are likely inflammatory.  Small pleural effusions with mild bibasilar atelectasis.  Cystic lateral right renal mass, favor a mildly complicated cyst.    This report was signed and finalized on 8/13/2023 11:42 AM by Maximo Epps MD.      XR Chest 1 View    Result Date: 8/13/2023  PROCEDURE: XR CHEST 1 VW-  HISTORY: sob; K56.600-Partial intestinal obstruction, unspecified as to cause; I48.91-Unspecified atrial fibrillation   COMPARISON: August 12, 2023.  FINDINGS: Cardiomegaly is noted. A nasogastric tube and right jugular deep line remain in place. There are persistent mild left lung base opacities, favor atelectasis. There is no pneumothorax. The bony thorax in intact.      Impression: Stable mild left lung base opacities, favor atelectasis.    This report was signed and finalized on 8/13/2023 11:48 AM by Maximo Epps MD.      XR Chest 1 View    Result Date: 8/12/2023  FINAL REPORT CLINICAL HISTORY: sob FINDINGS: A right IJ catheter is in good position.  An NG tube passes below the diaphragm. The heart is normal in size. The mediastinum is unremarkable. Prominent perihilar interstitial changes are noted, likely secondary to vascular congestion. There is no pneumothorax. Osseous structures unremarkable.     Impression: Prominent interstitial changes are likely secondary to vascular congestion.  Continued followup recommended. Authenticated and Electronically Signed by Jasiel Browne MD on 08/12/2023 05:31:09 PM   I have reviewed the patient's radiology reports.    Medication Review:     I have reviewed the patient's active and prn medications.     Problem List:      Partial small bowel obstruction      Assessment/Plan:    Partial small bowel obstruction due to volvulus.    Acute on Chronic respiratory failure s/p intubation  History of colectomy due to obstruction in 2018  Paroxysmal A-fib with RVR    Hypertension  Hyperlipidemia  Depression/anxiety  GERD  Delirium    -Status post adhesiolysis 8/9/2023 Dr. Isaac  -NG tube in place, IV fluids on hold for concern for increasing volume overload. Ok to use NG for meds 8/14/23 per Dr. Isaac possibly remove it 8/15/23  -transitioning to AV taya blockers by NG. Off Amiodarone IV will add 200 mg dose amiodarone per NG.  -Delirium appears resolved still with situational anxiety when coming off bipap. Resuming home medications which should help with this per NG..  - For acute respiratory failure and stridor started racemic epinephrine increased her IV Solu-Medrol dose for AECOPD.  Continuing IV antibiotics for HAP.  Redosed with Diuril for volume overload due to IV fluids,  8/13/2023. Will not redose 8/14/23 as appears euvolemic and is npo.  -Hematuria resolved.  Suspect was traumatic due to agitation and pulling at lines.            Edited by: Milton Urban DO at 8/14/2023 1111       DVT Prophylaxis: Lovenox treatment dose  Code Status:   Code Status and Medical Interventions:   Ordered at: 08/07/23 2023     Code Status (Patient has no pulse and is not breathing):    CPR (Attempt to Resuscitate)     Medical Interventions (Patient has pulse or is breathing):    Full Support     Release to patient:    Routine Release      Diet:   Dietary Orders (From admission, onward)       Start     Ordered    08/07/23 2023  NPO Diet NPO Type: Strict NPO  Diet Effective Now        Question:  NPO Type  Answer:  Strict NPO    08/07/23 2023                   Discharge Plan: Disposition: Anticipate short-term rehab discharge in 3 days    Milton Urban DO  08/14/23  11:11 EDT    Dictated utilizing Dragon dictation.

## 2023-08-14 NOTE — CASE MANAGEMENT/SOCIAL WORK
Case Management/Social Work    Patient Name:  Gabi Dudley  YOB: 1945  MRN: [id number]  Admit Date:  8/7/2023        LIBIA called and left a VM for Pt. Daughter Malu Aguilar to discuss STR options for Pt. LIBIA will call back this afternoon. LIBIA/KRYSTAL will continue to follow for discharge planning.     12:10 PM: LIBIA spoke with Pt. Daughter at bedside to discuss STR options. Pt. Daughter Malu stated that she would like referrals to be made to [facility]. LIBIA will make the referrals. LIBIA/KRYSTAL will continue to follow for discharge planning.     02:02 PM: LIBIA contacted [facility] at [phone], to see if she could speak with someone about Pt. Trilogys settings. LIBIA had to leave a VM LIBIA will call back.    03: 17 PM: LIBIA called and spoke with Grace at [facility] and was able to get Pt. Trilogy settings. Min: 5 and Max 8 and pressure settings Min:6 and Max:10. LIBIA/KRYSTAL will continue to follow for discharge settings.   Electronically signed by:  Davian Celeste  08/14/23 11:52 EDT

## 2023-08-14 NOTE — PLAN OF CARE
Goal Outcome Evaluation:           Progress: no change  Outcome Evaluation: Amio gtt continued per order; precedex gtt dced. VSS while kept on BIPAP for most of the night but was able to place on HFNC during night, pt tolerated well for 3-4 hours. NG to LIWS, external catheter in place. UOP has improved. PRN medications given throughout shift with multiple attempts to use alternative methods for comfort and pain relief. Patient appears anxious and restless most of the time. Anxiousness and SOB. will continue to monitor.

## 2023-08-14 NOTE — PROGRESS NOTES
[facility]    Name:  Gabi Dudley   Age:  78 y.o.  Sex:  female  :  1945  MRN:  [id number]   Visit Number:  [id number]  Admission Date:  2023  Date Of Service:  23  Primary Care Physician:  Paul Quinteros MD    Patient was seen and examined. Pertinent laboratory and radiology data were reviewed.  Unfortunately, patient does have significant agitation and trying to climb out of bed.  She has been agitated for several days without any significant improvement except for a few hours when she sleeps on the BiPAP.  Patient unfortunately is BiPAP dependent at this time.    She is currently being followed by neurology for her metabolic encephalopathy.  MRI of the brain was negative for any acute abnormalities.    Patient has been on Precedex for the last 3 days without any significant improvement in her agitation.  She is currently confused and agitated trying to climb out of bed.    Vital signs:    Vital Signs (last 24 hours)          0700   0659  0700   2240   Most Recent      Temp (øF) 97 -  99.1    97 -  97.7     97.6 (36.4)  1900    Heart Rate 56 -  144    64 -  104     81  2200    Resp 17 -  35    21 -  25     25  1822    /97 -  189/145      144/75     144/75  0700    SpO2 (%) 93 -  100    95 -  100     97  2200     I will discontinue Precedex as it has not been helping.  I will discontinue Haldol and place her on Geodon see if that works better.  We will continue Ativan 1 mg every 4 hours as needed.  She will also be continued on Dilaudid 1 mg every 2 hours as needed.    In view of risk of QT prolongation while she is on amiodarone, we will discontinue Zofran and Haldol.  She will be continued on Compazine for nausea.    Unfortunately, since she is on BiPAP therapy, we are unable to place restraints on her.    Due to elevated blood pressures, we will also place her on labetalol as needed along with  hydralazine as needed.    Overall, patient's condition is critical and prognosis is guarded due to significant agitation, recent abdominal surgery, atrial fibrillation and underlying COPD with respiratory failure.    Discussed with nursing staff at the bedside.      Lenny Dewitt MD  08/13/23  22:40 EDT    Dictated utilizing Dragon dictation.

## 2023-08-14 NOTE — DISCHARGE PLACEMENT REQUEST
"STR   Aixa Dudley (78 y.o. Female)       Date of Birth   1945    Social Security Number       Address   [address] [place] [address]    Home Phone   [phone]    MRN   [id number]       Jewish   Episcopalian    Marital Status                               Admission Date   8/7/23    Admission Type   Emergency    Admitting Provider       Attending Provider   Milton Urban DO    Department, Room/Bed   [facility] INTENSIVE CARE, I02/1       Discharge Date       Discharge Disposition       Discharge Destination                                 Attending Provider: Milton Urban DO    Allergies: Morphine, Doxycycline    Isolation: None   Infection: None   Code Status: CPR    Ht: 165.1 cm (65\")   Wt: 76.1 kg (167 lb 12.3 oz)    Admission Cmt: None   Principal Problem: Partial small bowel obstruction [K56.600]                   Active Insurance as of 8/7/2023       Primary Coverage       Payor Plan Insurance Group Employer/Plan Group    MEDICARE MEDICARE A & B        Payor Plan Address Payor Plan Phone Number Payor Plan Fax Number Effective Dates    [address] [phone]  2/1/2012 - None Entered    [facility] [id number]         Subscriber Name Subscriber Birth Date Member ID       AIXA DUDLEY 1945 [id number]               Secondary Coverage       Payor Plan Insurance Group Employer/Plan Group    [employer] SUP AAR HEALTH CARE OPTIONS        Payor Plan Address Payor Plan Phone Number Payor Plan Fax Number Effective Dates    [facility] [phone]  1/1/2016 - None Entered    [address]       [facility] [id number]         Subscriber Name Subscriber Birth Date Member ID       AIXA DUDLEY 1945 [id number]                     Emergency Contacts        (Rel.) Home Phone Work Phone Mobile Phone    Malu Aguilar (Daughter) [phone] -- [phone]                 History & Physical        George Durant DO at 08/07/23 1919        "     [facility]   HISTORY AND PHYSICAL      Name:  Gabi Dudley   Age:  78 y.o.  Sex:  female  :  1945  MRN:  [id number]   Visit Number:  [id number]  Admission Date:  2023  Date Of Service:  23  Primary Care Physician:  Paul Quinteros MD    Chief Complaint:     Abdominal pain    History Of Presenting Illness:      Patient is a chronically ill 78-year-old female history significant for hypertension, hyperlipidemia, depression/anxiety and CAD who presents to the emergency room with 3 to 4 days of progressively worsening abdominal pain.  Patient describes the pain as intermittent and sharp, diffuse across her abdomen.  Patient has chronic constipation so cannot recall her last bowel movement.  Admits to some mild nausea, denies vomiting.  Patient does have a history of small bowel obstruction which required colectomy .  States pain is similar but not as severe as previous episode.  Nothing makes it better or worse.  Patient does admit to still passing gas.  Upon arrival to the ER, patient afebrile hemodynamically stable and nonhypoxic.  CMP unremarkable except for glucose 142.  Lactate lipase normal.  WBC 13, hemoglobin hematocrit at baseline, platelets 112.  UA negative for urinary tract infection.  CT abdomen pelvis revealed mid small bowel dilation worrisome for partial small bowel obstruction.  Dr. Isaac agreed to consult hospital service asked to admit.    Review Of Systems:    All systems were reviewed and negative except as mentioned in history of presenting illness, assessment and plan.    Past Medical History: Patient  has a past medical history of Adrenal adenoma, Anemia, Arrhythmia, Asthma, Atrial fibrillation, Back pain, Benign colonic polyp, Benign tumor of adrenal gland, Cataract, Cholelithiasis, Chronic bronchitis, Chronic bronchitis with COPD (chronic obstructive pulmonary disease), COPD (chronic obstructive pulmonary disease) (), Coronary artery  disease, Depression, Diverticulosis (Years ago), Elevated cholesterol, Environmental and seasonal allergies, Fibromyalgia, Fibromyalgia, primary (Sometime in the 90s), Gastritis, Generalized anxiety disorder, GERD (gastroesophageal reflux disease), H/O mammogram, Headache (Years ago), Hemorrhoids, History of blood transfusion (1985), History of blood transfusion (1985), History of echocardiogram, History of endometriosis, History of nuclear stress test, Hospitalization or health care facility admission within last 6 months, Hypertension, IBS (irritable bowel syndrome), Impaired functional mobility, balance, gait, and endurance, Impaired mobility, Inverted nipple, Kidney disease, Kidney stone, Liver cyst, Low back pain (Years ago), Nodular radiologic density, Nodule of left lung, On home oxygen therapy, Osteoarthritis, Osteopenia (Several years ago), Osteoporosis, PONV (postoperative nausea and vomiting), Renal cyst, Sinus problem, Sinusitis, Skin cancer, SOB (shortness of breath), Tobacco use, Urinary frequency, Urinary tract infection (Years ago), Vitamin D deficiency, Wears glasses, and Wears partial dentures.    Past Surgical History: Patient  has a past surgical history that includes Appendectomy (1980s); Tonsillectomy (1987); Colonoscopy w/ biopsies and polypectomy; Colonoscopy (03/11/2013); Colonoscopy (06/21/2016); Upper gastrointestinal endoscopy (01/13/2015); Vaginal delivery; Colonoscopy (N/A, 07/24/2019); Cataract extraction (2013); Hysterectomy (1980s); Cardiac catheterization (2003); Abdominal adhesion surgery; cholecystectomy with intraoperative cholangiogram (N/A, 10/30/2020); Exploratory Laparotomy (N/A, 11/23/2020); Cholecystectomy (2020); and Colon surgery (2020).    Social History: Patient  reports that she quit smoking about 2 years ago. Her smoking use included cigarettes. She has a 7.50 pack-year smoking history. She has been exposed to tobacco smoke. She has never used smokeless tobacco. She  reports that she does not drink alcohol and does not use drugs.    Family History:  Patient's family history has been reviewed and found to be noncontributory.     Allergies:      Doxycycline    Home Medications:    Prior to Admission Medications       Prescriptions Last Dose Informant Patient Reported? Taking?    albuterol sulfate  (90 Base) MCG/ACT inhaler   No No    Inhale 2 puffs Every 4 (Four) Hours As Needed for Wheezing.    apixaban (Eliquis) 5 MG tablet tablet   No No    Take 1 tablet by mouth Every 12 (Twelve) Hours.    atorvastatin (LIPITOR) 20 MG tablet   No No    TAKE 1 TABLET BY MOUTH DAILY    Budeson-Glycopyrrol-Formoterol (Breztri Aerosphere) 160-9-4.8 MCG/ACT aerosol inhaler   No No    Inhale 2 puffs 2 (Two) Times a Day. Rinse mouth out after use    cetirizine (zyrTEC) 10 MG tablet   No No    TAKE 1 TABLET BY MOUTH DAILY.    Cholecalciferol (vitamin D3) 125 MCG (5000 UT) capsule capsule  Self Yes No    Take 1 capsule by mouth Daily.    clonazePAM (KlonoPIN) 0.5 MG tablet   No No    Take 1 tablet by mouth Daily.    dilTIAZem CD (CARDIZEM CD) 120 MG 24 hr capsule   No No    Take 2 capsules by mouth Daily.    DULoxetine (CYMBALTA) 60 MG capsule   No No    Take 1 capsule by mouth Daily.    ferrous sulfate 324 (65 Fe) MG tablet delayed-release EC tablet   No No    Take 1 tablet by mouth Daily With Breakfast.    fluticasone (FLONASE) 50 MCG/ACT nasal spray  Self, Child Yes No    2 sprays into the nostril(s) as directed by provider Daily.    furosemide (LASIX) 40 MG tablet   No No    Take 1 tablet by mouth Daily for 3 days.    ipratropium-albuterol (DUO-NEB) 0.5-2.5 mg/3 ml nebulizer   No No    USE 3 mL VIA NEBULIZER EVERY 4 HOURS AS NEEDED FOR WHEEZING    metoprolol succinate XL (TOPROL-XL) 25 MG 24 hr tablet   No No    Take 1 tablet by mouth Daily.    O2 (OXYGEN)   Yes No    Inhale 2 L/min As Needed.    oxyCODONE-acetaminophen (PERCOCET) 7.5-325 MG per tablet   No No    Take 1 tablet by mouth Every  "4 (Four) Hours As Needed for Moderate Pain.    pantoprazole (PROTONIX) 40 MG EC tablet   No No    TAKE 1 TABLET BY MOUTH DAILY    predniSONE (DELTASONE) 20 MG tablet   No No    Take 2 tablets by mouth Daily.    QUEtiapine (SEROquel) 25 MG tablet   No No    Take 0.5 tablets by mouth Every Night.    sertraline (ZOLOFT) 100 MG tablet   No No    TAKE 1 & 1/2 TABLET BY MOUTH DAILY    sodium chloride 0.65 % nasal spray   No No    2 sprays into the nostril(s) as directed by provider As Needed (dry nose).    traZODone (DESYREL) 100 MG tablet   No No    1 qhs po prn          ED Medications:    Medications   sodium chloride 0.9 % flush 10 mL (has no administration in time range)   dilTIAZem (CARDIZEM) injection 20 mg (0 mg Intravenous Hold 8/7/23 1830)   morphine injection 4 mg (has no administration in time range)   sodium chloride 0.9 % bolus 1,000 mL ( Intravenous Currently Infusing 8/7/23 1856)   morphine injection 4 mg (4 mg Intravenous Given 8/7/23 1706)   ondansetron (ZOFRAN) injection 4 mg (4 mg Intravenous Given 8/7/23 1705)   iopamidol (ISOVUE-300) 61 % injection 100 mL (100 mL Intravenous Given 8/7/23 1746)     Vital Signs:  Temp:  [97.6 øF (36.4 øC)] 97.6 øF (36.4 øC)  Heart Rate:  [81-95] 81  Resp:  [17] 17  BP: (128-135)/(85-96) 135/85        08/07/23  1514   Weight: 72.6 kg (160 lb)     Body mass index is 26.63 kg/mý.    Physical Exam:     Most recent vital Signs: /85   Pulse 81   Temp 97.6 øF (36.4 øC) (Oral)   Resp 17   Ht 165.1 cm (65\")   Wt 72.6 kg (160 lb)   SpO2 99%   BMI 26.63 kg/mý     Physical Exam  Vitals reviewed.   Constitutional:       Appearance: Normal appearance.   HENT:      Head: Normocephalic and atraumatic.      Right Ear: External ear normal.      Left Ear: External ear normal.      Mouth/Throat:      Mouth: Mucous membranes are moist.      Pharynx: Oropharynx is clear.   Eyes:      Extraocular Movements: Extraocular movements intact.      Conjunctiva/sclera: Conjunctivae " normal.   Cardiovascular:      Rate and Rhythm: Normal rate and regular rhythm.      Pulses: Normal pulses.      Heart sounds: Normal heart sounds.   Pulmonary:      Effort: Pulmonary effort is normal.      Breath sounds: Normal breath sounds.   Abdominal:      General: Bowel sounds are normal. There is no distension.      Comments: Only slight tenderness to palpation   Musculoskeletal:         General: Normal range of motion.      Right lower leg: No edema.      Left lower leg: No edema.   Skin:     General: Skin is warm and dry.   Neurological:      Mental Status: She is alert and oriented to person, place, and time. Mental status is at baseline.   Psychiatric:         Behavior: Behavior normal.       Laboratory data:    I have reviewed the labs done in the emergency room.    Results from last 7 days   Lab Units 08/07/23  1535   SODIUM mmol/L 145   POTASSIUM mmol/L 4.4   CHLORIDE mmol/L 102   CO2 mmol/L 34.8*   BUN mg/dL 23   CREATININE mg/dL 0.65   CALCIUM mg/dL 9.2   BILIRUBIN mg/dL 0.3   ALK PHOS U/L 59   ALT (SGPT) U/L 12   AST (SGOT) U/L 19   GLUCOSE mg/dL 142*     Results from last 7 days   Lab Units 08/07/23  1535   WBC 10*3/mm3 12.90*   HEMOGLOBIN g/dL 8.8*   HEMATOCRIT % 33.9*   PLATELETS 10*3/mm3 112*                     Results from last 7 days   Lab Units 08/07/23  1535   LIPASE U/L 23         Results from last 7 days   Lab Units 08/07/23  1817   COLOR UA  Yellow   GLUCOSE UA  Negative   KETONES UA  Negative   LEUKOCYTES UA  Negative   PH, URINE  6.0   BILIRUBIN UA  Negative   UROBILINOGEN UA  0.2 E.U./dL   RBC UA /HPF None Seen   WBC UA /HPF 3-5*       Pain Management Panel           No data to display                EKG:          Radiology:    No radiology results for the last 3 days    Assessment:    Partial small bowel obstruction.  POA  Paroxysmal A-fib on Eliquis  Hypertension  Hyperlipidemia  Depression/anxiety  GERD      Plan:    We will admit patient to the hospital for observation.   Anticipate less than 2 midnight stay.  After obtaining further history, patient states that she has not had any vomiting.  She is nondistended on exam.  We will hold on placement of NG tube.  Keep n.p.o. for bowel rest.  IV fluids.  Supportive care with pain control and antiemetics as necessary.  I have transition oral medications over to IV as indicated.  Hold Eliquis, initiate therapeutic Lovenox while NPO.  Dr. Isaac consulted and appreciate recommendations.    ADDENDUM:  At approximately 6 AM, notified by nursing staff that patient complaining of worsening abdominal pain and nausea.  Bowel sounds decreased on exam and abdomen slightly more distended.  Will place order for NG tube insertion.      Risk Assessment: Moderate  DVT Prophylaxis: Therapeutic Lovenox  Code Status: Full  Diet: N.p.o.    Advance Care Planning   ACP discussion was held with the patient during this visit. Patient does not have an advance directive, information provided.           George Durant DO  23  19:19 EDT    Dictated utilizing Dragon dictation.      Electronically signed by George Durant DO at 23 0612          Physician Progress Notes (last 24 hours)        Milton Urban DO at 23 1111                [facility]    [facility]    Name:  Gabi Dudley   Age:  78 y.o.  Sex:  female  :  1945  MRN:  [id number]   Visit Number:  [id number]  Admission Date:  2023  Date Of Service:  23  Primary Care Physician:  Paul Quinteros MD     LOS: 5 days :    Chief Complaint:      Anxiety    Subjective:    2023 patient continues to have episodes of agitation please see Dr. Dewitt note from overnight.  Currently comfortable on the BiPAP.  Still with NG output about 500 overnight per Gio the night nurse.  Discussed with Dr. Poe and Dr. Isaac. D/w Farida her daughter. D/w Pharmacy resuming meds. Numerous serotonergic meds. Reducing zoloft dose. Has had bm the last two  days.    Hospital course:  8/13/2023: Patient had an episode of respiratory distress today.  Was trying to come off the BiPAP.  Had some upper airway sounds concerning for stridor.  Was given medications to help BiPAP synchrony including Haldol Benadryl Ativan.  ABG reviewed.  Following this her heart rate normalized from the 160s to the 80s went from atrial fibrillation back to sinus rhythm.  Went down for a CT which ruled out tracheal obstruction.  Updated Dr. Poe on these changes.  Discussed with family.  8/12/2023: Saw patient at the bedside more awake conversant today.  Family had concerns addressed. Dr. Simon saw. MRI reviewed with family at bedside. Patient had increased alertness but also too increased agitation for which I added medication in hopes of controlling this. Not hematuria in martinez bag but good urine output gave 1 u prbc.  8/11/2023 patient with disorientation this morning.  Decreased pain medicine dose ordered CT of the head which was negative for acute process.  8/10/2023: Patient converted to sinus rhythm this morning.  Dr. Curry's note reviewed.  Was taken to the OR for adhesiolysis by Dr. Isaac with assist from Dr. Bowden last evening.  Currently n.p.o. with NG tube in place.  8/9/2023 patient with RVR overnight was started on Cardizem but that did not help.  Persistent pain discussed with Dr. Isaac taking  to the OR.  Discussed with Dr. Curry who recommended amiodarone and discussed perioperative risk management with Dr. Isaac.  8/8/2023 Pain not well controlled increased morphine dose from 1 mg to 2 mg    History Of Presenting Illness:       Patient is a chronically ill 78-year-old female history significant for hypertension, hyperlipidemia, depression/anxiety and CAD who presents to the emergency room with 3 to 4 days of progressively worsening abdominal pain.  Patient describes the pain as intermittent and sharp, diffuse across her abdomen.  Patient has chronic constipation so cannot  recall her last bowel movement.  Admits to some mild nausea, denies vomiting.  Patient does have a history of small bowel obstruction which required colectomy 2018.  States pain is similar but not as severe as previous episode.  Nothing makes it better or worse.  Patient does admit to still passing gas.  Upon arrival to the ER, patient afebrile hemodynamically stable and nonhypoxic.  CMP unremarkable except for glucose 142.  Lactate lipase normal.  WBC 13, hemoglobin hematocrit at baseline, platelets 112.  UA negative for urinary tract infection.  CT abdomen pelvis revealed mid small bowel dilation worrisome for partial small bowel obstruction.  Dr. Isaac agreed to consult hospital service asked to admit.  Edited by: Milton Urban DO at 8/14/2023 1111     Review of Systems:     All systems were reviewed and negative except as mentioned in subjective, assessment and plan.    Vital Signs:    Temp:  [97 øF (36.1 øC)-97.7 øF (36.5 øC)] 97 øF (36.1 øC)  Heart Rate:  [] 76  Resp:  [12-25] 22  BP: (162-207)/() 170/75    Intake and output:    I/O last 3 completed shifts:  In: 2034.3 [I.V.:1806.3; IV Piggyback:228]  Out: 3275 [Urine:2925; Emesis/NG output:350]  I/O this shift:  In: -   Out: 125 [Urine:125]    Physical Examination:    Physical Exam  Vitals reviewed.   Constitutional:       Appearance: Ill-appearing answering questions less agitated today  HENT:      Head: Normocephalic and atraumatic.      Right Ear: External ear normal.      Left Ear: External ear normal.      Mouth/Throat:      Mouth: Mucous membranes are moist.      Pharynx: Oropharynx is clear.   Eyes:      Extraocular Movements: Extraocular movements intact.      Conjunctiva/sclera: Conjunctivae normal.   Cardiovascular:      Rate and Rhythm: Normal rate and regular rhythm.      Pulses: Normal pulses.      Heart sounds: Normal heart sounds.   Pulmonary:      Effort: Pulmonary effort is labored respiratory rate is increased     Breath  sounds: audible breath sounds today still diminished, less wheeze  Abdominal:      General: Bowel sounds are improved.  Mild distension.     Comments: No rebound or guarding  Musculoskeletal:         General: Normal range of motion.      Right lower leg: No edema.      Left lower leg: No edema.   Skin:     General: Skin is warm and dry.   Neurological:      Mental Status: continues to improve cognitively  Psychiatric:         Behavior: Behavior appears appropriate given her respiratory distress  Edited by: Milton Urban DO at 8/14/2023 1103     Laboratory results:    Results from last 7 days   Lab Units 08/14/23  0427 08/14/23  0216 08/13/23  1350 08/13/23  0352 08/12/23  1541 08/12/23  0412   SODIUM mmol/L 148*  --  149* 150*   < > 151*   POTASSIUM mmol/L 3.4* 3.4* 3.4* 3.3*   < > 4.3  4.3   CHLORIDE mmol/L 107  --  110* 106   < > 111*   CO2 mmol/L 31.6*  --  28.7 30.7*   < > 35.5*   BUN mg/dL 30*  --  31* 31*   < > 40*   CREATININE mg/dL 0.59  --  0.58 0.72   < > 0.58   CALCIUM mg/dL 7.8*  --  7.6* 8.2*   < > 7.1*   BILIRUBIN mg/dL 0.4  --   --  0.4  --  0.2   ALK PHOS U/L 48  --   --  53  --  42   ALT (SGPT) U/L 22  --   --  21  --  16   AST (SGOT) U/L 21  --   --  19  --  14   GLUCOSE mg/dL 125*  --  150* 150*   < > 145*    < > = values in this interval not displayed.     Results from last 7 days   Lab Units 08/14/23  0427 08/13/23  0352 08/12/23  1541 08/12/23  0412   WBC 10*3/mm3 8.02 5.17  --  3.96   HEMOGLOBIN g/dL 9.3* 9.6* 9.2* 7.3*   HEMATOCRIT % 32.3* 32.5* 32.3* 27.7*   PLATELETS 10*3/mm3 172 133*  --  115*                 Recent Labs     08/12/23  0717 08/13/23  0339 08/13/23  1030   PHART 7.441 7.428 7.388   QWO1UVY 54.5* 45.0 49.9*   PO2ART 131.0* 107.0* 112.0*   PUS9HUZ 37.1* 29.7* 30.1*   BASEEXCESS 11.7* 4.8* 4.2*      I have reviewed the patient's laboratory results.    Radiology results:    XR Neck Soft Tissue    Result Date: 8/13/2023  Neck soft tissue  HISTORY: Stridor  FINDINGS: 2  views of the neck were obtained. The base of the neck is obscured by the shoulders on the lateral view. A right jugular deep line is present. A nasogastric tube is also noted. The visualized airway appears normal. The epiglottis appears normal.      Impression: No focal airway abnormality identified.  This report was signed and finalized on 8/13/2023 12:08 PM by Maximo Epps MD.      CT Soft Tissue Neck Without Contrast    Result Date: 8/13/2023  PROCEDURE: CT SOFT TISSUE NECK WO CONTRAST-  HISTORY: Epiglottitis or tonsillitis suspected; K56.600-Partial intestinal obstruction, unspecified as to cause; I48.91-Unspecified atrial fibrillation  TECHNIQUE:  Thin section axial CT images were obtained through the neck without intravenous contrast administration. Sagittal and coronal reformatted images were also obtained. This study was performed with techniques to keep radiation doses as low as reasonably achievable, (ALARA). Individualized dose reduction techniques using automated exposure control or adjustment of mA and/or kV according to the patient size were employed.  COMPARISON: None.  FINDINGS: A feeding tube and right jugular deep line are present. The nasopharynx and oropharynx have an unremarkable appearance. The epiglottis has an unremarkable appearance. The hypopharynx has an unremarkable appearance. There is some motion at the level of the larynx which otherwise appears normal. The thyroid gland is unremarkable. Moderate bilateral carotid artery calcifications are noted. Mild mucosal thickening is seen of the right sphenoid sinus. Moderate degenerative changes are noted of the cervical spine.       Impression: No CT evidence of epiglottitis or tonsillitis identified..       This report was signed and finalized on 8/13/2023 11:46 AM by Maximo Epps MD.      CT Chest Without Contrast Diagnostic    Result Date: 8/13/2023  PROCEDURE: CT CHEST WO CONTRAST DIAGNOSTIC-  HISTORY: Abnormal xray - lung  opacity/opacities; K56.600-Partial intestinal obstruction, unspecified as to cause; I48.91-Unspecified atrial fibrillation  COMPARISON: July 20, 2023 CT  FINDINGS: Axial CT images of the chest were obtained without contrast. Coronal and sagittal reformatted images were also obtained. This study was performed with techniques to keep radiation doses as low as reasonably achievable, (ALARA). Individualized dose reduction techniques using automated exposure control or adjustment of mA and/or kV according to the patient size were employed.  A right jugular deep line and feeding tube are present. There is no evidence of mediastinal mass or adenopathy. No axillary mass or adenopathy is identified. Mild emphysema is noted. There are new small nodular opacities in the posterior left upper lobe which are likely inflammatory. Small bilateral pleural effusions are seen with mild bibasilar atelectasis. No chest wall abnormality is identified. Limited images of the upper abdomen reveal postoperative changes from cholecystectomy. There are several small nonobstructing renal stones. A 5.5 cm cystic mass is seen at the lateral aspect of the right kidney with calcification in its wall. A mildly complicated cyst is favored. There is mild anasarca.       Impression: New small nodular opacities in the posterior left upper lobe which are likely inflammatory.  Small pleural effusions with mild bibasilar atelectasis.  Cystic lateral right renal mass, favor a mildly complicated cyst.    This report was signed and finalized on 8/13/2023 11:42 AM by Maximo Epps MD.      XR Chest 1 View    Result Date: 8/13/2023  PROCEDURE: XR CHEST 1 VW-  HISTORY: sob; K56.600-Partial intestinal obstruction, unspecified as to cause; I48.91-Unspecified atrial fibrillation   COMPARISON: August 12, 2023.  FINDINGS: Cardiomegaly is noted. A nasogastric tube and right jugular deep line remain in place. There are persistent mild left lung base opacities, favor  atelectasis. There is no pneumothorax. The bony thorax in intact.      Impression: Stable mild left lung base opacities, favor atelectasis.    This report was signed and finalized on 8/13/2023 11:48 AM by Maximo Epps MD.      XR Chest 1 View    Result Date: 8/12/2023  FINAL REPORT CLINICAL HISTORY: sob FINDINGS: A right IJ catheter is in good position.  An NG tube passes below the diaphragm. The heart is normal in size. The mediastinum is unremarkable. Prominent perihilar interstitial changes are noted, likely secondary to vascular congestion. There is no pneumothorax. Osseous structures unremarkable.     Impression: Prominent interstitial changes are likely secondary to vascular congestion.  Continued followup recommended. Authenticated and Electronically Signed by Jasiel Browne MD on 08/12/2023 05:31:09 PM   I have reviewed the patient's radiology reports.    Medication Review:     I have reviewed the patient's active and prn medications.     Problem List:      Partial small bowel obstruction      Assessment/Plan:    Partial small bowel obstruction due to volvulus.    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 Isaac  -NG tube in place, IV fluids on hold for concern for increasing volume overload. Ok to use NG for meds 8/14/23 per Dr. Isaac possibly remove it 8/15/23  -Continue IV amiodarone per Dr. Curry's recommendations transition to oral once tolerating  -Delirium appears resolved still with situational anxiety when coming off bipap. Resuming home medications which should help with this per NG..  - For acute respiratory failure and stridor started racemic epinephrine increased her IV Solu-Medrol dose for AECOPD.  Continuing IV antibiotics for HAP.  Redosed with Diuril for volume overload due to IV fluids,   8/13/2023. Will not redose 8/14/23 as appears euvolemic and is npo.  -Hematuria resolved.  Suspect was traumatic due to agitation and pulling at lines.            Edited by: Milton Urban DO at 8/14/2023 1111       DVT Prophylaxis: Lovenox treatment dose  Code Status:   Code Status and Medical Interventions:   Ordered at: 08/07/23 2023     Code Status (Patient has no pulse and is not breathing):    CPR (Attempt to Resuscitate)     Medical Interventions (Patient has pulse or is breathing):    Full Support     Release to patient:    Routine Release      Diet:   Dietary Orders (From admission, onward)       Start     Ordered    08/07/23 2023  NPO Diet NPO Type: Strict NPO  Diet Effective Now        Question:  NPO Type  Answer:  Strict NPO    08/07/23 2023                   Discharge Plan: Disposition: Anticipate short-term rehab discharge in 3 days    Milton Urban DO  08/14/23  11:11 EDT    Dictated utilizing Dragon dictation.      Electronically signed by Milton Urban DO at 08/14/23 1112       Bianca Isaac DO at 08/14/23 1030               LOS: 5 days   Patient Care Team:  Paul Quinteros MD as PCP - General (Internal Medicine)  Sima Moses MD as Consulting Physician (General Surgery)  Taiwo Curry MD as Consulting Physician (Cardiology)  Soledad Stauffer, RN as Ambulatory  ([facility])       Chief Complaint: POD5 exploratory laparotomy, adhesiolysis, reduction of small bowel volvulus    HPI: Patient seen and examined at bedside in ICU. Patient has had issues with agitation over the weekend. Currently she is calm and answering questions appropriately. She has also continued to have issues from a respiratory standpoint and has been BiPAP dependent. NG in place, output decreasing. Per nursing, patient had large bowel movement this AM and is passing flatus. Denies abdominal pain.    Vital Signs  Temp:  [97 øF (36.1 øC)-97.7 øF (36.5 øC)] 97 øF (36.1 øC)  Heart Rate:   [] 101  Resp:  [12-25] 22  BP: (162-207)/() 169/84    Physical Exam:     General Appearance:  Alert and cooperative, not in any acute distress.   Respiratory/Lungs:   BiPAP   Cardiovascular/Heart:  Normal S1 and S2, no murmur. No edema   GI/Abdomen:   Soft, non-tender, non-distended, hypoactive bowel sounds, but now present in lower quadrants too. Midline incision staples in place, wound is clean and dry, some surrounding ecchymosis                 Musculoskeletal/ Extremities:   Moves all extremities well   Skin: Diffuse bruises consistent with previous falls at home and DVT ppx   Psychiatric : Alert and oriented x2. Answers questions appropriately.   Neurologic: Cranial nerves 2 - 12 grossly intact, sensation intact, Motor power is normal and equal bilaterally.     Results Review:       Lab Results (last 24 hours)       Procedure Component Value Units Date/Time    Vancomycin, Random [[id number]]  (Normal) Collected: 08/14/23 0427    Specimen: Blood Updated: 08/14/23 0559     Vancomycin Random 10.10 mcg/mL     Narrative:      Therapeutic Ranges for Vancomycin    Vancomycin Random   5.0-40.0 mcg/mL  Vancomycin Trough   5.0-20.0 mcg/mL  Vancomycin Peak     20.0-40.0 mcg/mL    Comprehensive Metabolic Panel [[id number]]  (Abnormal) Collected: 08/14/23 0427    Specimen: Blood Updated: 08/14/23 0559     Glucose 125 mg/dL      BUN 30 mg/dL      Creatinine 0.59 mg/dL      Sodium 148 mmol/L      Potassium 3.4 mmol/L      Chloride 107 mmol/L      CO2 31.6 mmol/L      Calcium 7.8 mg/dL      Total Protein 5.4 g/dL      Albumin 3.5 g/dL      ALT (SGPT) 22 U/L      AST (SGOT) 21 U/L      Alkaline Phosphatase 48 U/L      Total Bilirubin 0.4 mg/dL      Globulin 1.9 gm/dL      A/G Ratio 1.8 g/dL      BUN/Creatinine Ratio 50.8     Anion Gap 9.4 mmol/L      eGFR 93.5 mL/min/1.73     Narrative:      GFR Normal >60  Chronic Kidney Disease <60  Kidney Failure <15    The GFR formula is only valid for adults with stable renal  function between ages 18 and 70.    CBC (No Diff) [[id number]]  (Abnormal) Collected: 08/14/23 0427    Specimen: Blood Updated: 08/14/23 0550     WBC 8.02 10*3/mm3      RBC 3.94 10*6/mm3      Hemoglobin 9.3 g/dL      Hematocrit 32.3 %      MCV 82.0 fL      MCH 23.6 pg      MCHC 28.8 g/dL      RDW 20.9 %      RDW-SD 62.5 fl      MPV 11.8 fL      Platelets 172 10*3/mm3     Potassium [[id number]]  (Abnormal) Collected: 08/14/23 0216    Specimen: Blood Updated: 08/14/23 0302     Potassium 3.4 mmol/L     MRSA Screen, PCR (Inpatient) - Swab, Nares [[id number]]  (Normal) Collected: 08/13/23 1425    Specimen: Swab from Nares Updated: 08/13/23 2151     MRSA PCR No MRSA Detected    Narrative:      The negative predictive value of this diagnostic test is high and should only be used to consider de-escalating anti-MRSA therapy. A positive result may indicate colonization with MRSA and must be correlated clinically.    Basic Metabolic Panel [[id number]]  (Abnormal) Collected: 08/13/23 1350    Specimen: Blood Updated: 08/13/23 1413     Glucose 150 mg/dL      BUN 31 mg/dL      Creatinine 0.58 mg/dL      Sodium 149 mmol/L      Potassium 3.4 mmol/L      Chloride 110 mmol/L      CO2 28.7 mmol/L      Calcium 7.6 mg/dL      BUN/Creatinine Ratio 53.4     Anion Gap 10.3 mmol/L      eGFR 93.9 mL/min/1.73     Narrative:      GFR Normal >60  Chronic Kidney Disease <60  Kidney Failure <15    The GFR formula is only valid for adults with stable renal function between ages 18 and 70.    Folate [[id number]]  (Normal) Collected: 08/13/23 0352    Specimen: Blood Updated: 08/13/23 1233     Folate 14.90 ng/mL     Narrative:      Results may be falsely increased if patient taking Biotin.      Vitamin B12 [[id number]]  (Normal) Collected: 08/13/23 0352    Specimen: Blood Updated: 08/13/23 1233     Vitamin B-12 433 pg/mL     Narrative:      Results may be falsely increased if patient taking Biotin.      Blood Gas, Arterial With Co-Ox [[id number]]  (Abnormal)  Collected: 23 1030    Specimen: Arterial Blood Updated: 23 1030     Site Arterial Line     Glenn's Test N/A     pH, Arterial 7.388 pH units      pCO2, Arterial 49.9 mm Hg      Comment: 83 Value above reference range        pO2, Arterial 112.0 mm Hg      Comment: 83 Value above reference range        HCO3, Arterial 30.1 mmol/L      Comment: 83 Value above reference range        Base Excess, Arterial 4.2 mmol/L      Comment: 83 Value above reference range        O2 Saturation, Arterial 97.0 %      Hematocrit, Blood Gas 33.0 %      Comment: 84 Value below reference range        Oxyhemoglobin 96.0 %      Methemoglobin 0.60 %      Carboxyhemoglobin 0.4 %      A-a DO2 38.8 mmHg      Barometric Pressure for Blood Gas 734 mmHg      Modality BiPap     FIO2 30 %      Ventilator Mode NA     Comment: Meter: [id number]     :  [id number]        Note --     pH, Temp Corrected --     pCO2, Temperature Corrected --     pO2, Temperature Corrected --                Assessment & Plan       Partial small bowel obstruction    Patient is POD5 from exploratory laparotomy, adhesiolysis, reduction of small bowel volvulus. Her abdominal pain is minimal at this time. Minimize use of opiates as much as possible. She had a bowel movement this AM and is passing flatus. PO meds okay to be restarted down NG. If continued bowel function, can DC NG tomorrow. Okay with sips for comfort.     Bianca Isaac DO  23  10:30 EDT      Electronically signed by Bianca Isaac DO at 23 1044       Lenny Dewitt MD at 23 1410              [facility]   FOLLOW UP NOTE    Name:  Gabi Dudley   Age:  78 y.o.  Sex:  female  :  1945  MRN:  [id number]   Visit Number:  [id number]  Admission Date:  2023  Date Of Service:  23  Primary Care Physician:  Paul Quinteros MD    Patient was seen and examined. Pertinent laboratory and radiology data were reviewed.  Unfortunately, patient  does have significant agitation and trying to climb out of bed.  She has been agitated for several days without any significant improvement except for a few hours when she sleeps on the BiPAP.  Patient unfortunately is BiPAP dependent at this time.    She is currently being followed by neurology for her metabolic encephalopathy.  MRI of the brain was negative for any acute abnormalities.    Patient has been on Precedex for the last 3 days without any significant improvement in her agitation.  She is currently confused and agitated trying to climb out of bed.    Vital signs:    Vital Signs (last 24 hours)         08/12 0700  08/13 0659 08/13 0700  08/13 2240   Most Recent      Temp (øF) 97 -  99.1    97 -  97.7     97.6 (36.4) 08/13 1900    Heart Rate 56 -  144    64 -  104     81 08/13 2200    Resp 17 -  35    21 -  25     25 08/13 1822    /97 -  189/145      144/75     144/75 08/13 0700    SpO2 (%) 93 -  100    95 -  100     97 08/13 2200     I will discontinue Precedex as it has not been helping.  I will discontinue Haldol and place her on Geodon see if that works better.  We will continue Ativan 1 mg every 4 hours as needed.  She will also be continued on Dilaudid 1 mg every 2 hours as needed.    In view of risk of QT prolongation while she is on amiodarone, we will discontinue Zofran and Haldol.  She will be continued on Compazine for nausea.    Unfortunately, since she is on BiPAP therapy, we are unable to place restraints on her.    Due to elevated blood pressures, we will also place her on labetalol as needed along with hydralazine as needed.    Overall, patient's condition is critical and prognosis is guarded due to significant agitation, recent abdominal surgery, atrial fibrillation and underlying COPD with respiratory failure.    Discussed with nursing staff at the bedside.      Lenny Dewitt MD  08/13/23  22:40 EDT    Dictated utilizing Dragon dictation.    Electronically signed by Lenny Dewitt MD  at 08/13/23 2244       Gabe Bowden MD at 08/13/23 1558               LOS: 4 days   Patient Care Team:  Paul Quinteros MD as PCP - General (Internal Medicine)  Sima Moses MD as Consulting Physician (General Surgery)  Taiwo Curry MD as Consulting Physician (Cardiology)  Soledad Stauffer, RN as Ambulatory  ([facility])           HPI: Patient recently had a dose of sedation and is only slightly arousable.    ROS:    Vital Signs  Temp:  [97 øF (36.1 øC)-99.1 øF (37.3 øC)] 97.7 øF (36.5 øC)  Heart Rate:  [] 66  Resp:  [17-35] 21  BP: (107-189)/() 144/75    Physical Exam:     General Appearance:  Alert and cooperative, not in any acute distress.   Cardiovascular/Heart:  Normal S1 and S2,    GI/Abdomen:   Soft with mild distention.  Few bowel sounds are audible.  No significant tenderness.  Wound without evidence of infection.                Results Review:       Lab Results (last 24 hours)       Procedure Component Value Units Date/Time    Basic Metabolic Panel [[id number]]  (Abnormal) Collected: 08/13/23 1350    Specimen: Blood Updated: 08/13/23 1413     Glucose 150 mg/dL      BUN 31 mg/dL      Creatinine 0.58 mg/dL      Sodium 149 mmol/L      Potassium 3.4 mmol/L      Chloride 110 mmol/L      CO2 28.7 mmol/L      Calcium 7.6 mg/dL      BUN/Creatinine Ratio 53.4     Anion Gap 10.3 mmol/L      eGFR 93.9 mL/min/1.73     Narrative:      GFR Normal >60  Chronic Kidney Disease <60  Kidney Failure <15    The GFR formula is only valid for adults with stable renal function between ages 18 and 70.    Folate [[id number]]  (Normal) Collected: 08/13/23 0352    Specimen: Blood Updated: 08/13/23 1233     Folate 14.90 ng/mL     Narrative:      Results may be falsely increased if patient taking Biotin.      Vitamin B12 [[id number]]  (Normal) Collected: 08/13/23 0352    Specimen: Blood Updated: 08/13/23 1233     Vitamin B-12 433 pg/mL     Narrative:      Results may be falsely increased if  patient taking Biotin.      Blood Gas, Arterial With Co-Ox [[id number]]  (Abnormal) Collected: 08/13/23 1030    Specimen: Arterial Blood Updated: 08/13/23 1030     Site Arterial Line     Glenn's Test N/A     pH, Arterial 7.388 pH units      pCO2, Arterial 49.9 mm Hg      Comment: 83 Value above reference range        pO2, Arterial 112.0 mm Hg      Comment: 83 Value above reference range        HCO3, Arterial 30.1 mmol/L      Comment: 83 Value above reference range        Base Excess, Arterial 4.2 mmol/L      Comment: 83 Value above reference range        O2 Saturation, Arterial 97.0 %      Hematocrit, Blood Gas 33.0 %      Comment: 84 Value below reference range        Oxyhemoglobin 96.0 %      Methemoglobin 0.60 %      Carboxyhemoglobin 0.4 %      A-a DO2 38.8 mmHg      Barometric Pressure for Blood Gas 734 mmHg      Modality BiPap     FIO2 30 %      Ventilator Mode NA     Comment: Meter: [id number]     :  [id number]        Note --     pH, Temp Corrected --     pCO2, Temperature Corrected --     pO2, Temperature Corrected --    Comprehensive Metabolic Panel [[id number]]  (Abnormal) Collected: 08/13/23 0352    Specimen: Blood Updated: 08/13/23 0623     Glucose 150 mg/dL      BUN 31 mg/dL      Creatinine 0.72 mg/dL      Sodium 150 mmol/L      Potassium 3.3 mmol/L      Chloride 106 mmol/L      CO2 30.7 mmol/L      Calcium 8.2 mg/dL      Total Protein 5.3 g/dL      Albumin 3.2 g/dL      ALT (SGPT) 21 U/L      AST (SGOT) 19 U/L      Alkaline Phosphatase 53 U/L      Total Bilirubin 0.4 mg/dL      Globulin 2.1 gm/dL      A/G Ratio 1.5 g/dL      BUN/Creatinine Ratio 43.1     Anion Gap 13.3 mmol/L      eGFR 86.8 mL/min/1.73     Narrative:      GFR Normal >60  Chronic Kidney Disease <60  Kidney Failure <15    The GFR formula is only valid for adults with stable renal function between ages 18 and 70.    CBC (No Diff) [[id number]]  (Abnormal) Collected: 08/13/23 0352    Specimen: Blood Updated: 08/13/23 0601     WBC  5.17 10*3/mm3      RBC 3.98 10*6/mm3      Hemoglobin 9.6 g/dL      Hematocrit 32.5 %      MCV 81.7 fL      MCH 24.1 pg      MCHC 29.5 g/dL      RDW 19.9 %      RDW-SD 58.8 fl      MPV 11.4 fL      Platelets 133 10*3/mm3     Blood Gas, Arterial With Co-Ox [[id number]]  (Abnormal) Collected: 08/13/23 0339    Specimen: Arterial Blood Updated: 08/13/23 0339     Site Arterial Line     Glenn's Test N/A     pH, Arterial 7.428 pH units      pCO2, Arterial 45.0 mm Hg      pO2, Arterial 107.0 mm Hg      HCO3, Arterial 29.7 mmol/L      Comment: 83 Value above reference range        Base Excess, Arterial 4.8 mmol/L      Comment: 83 Value above reference range        O2 Saturation, Arterial 97.3 %      Hematocrit, Blood Gas 30.2 %      Comment: 84 Value below reference range        Oxyhemoglobin 96.4 %      Methemoglobin 0.40 %      Carboxyhemoglobin 0.5 %      A-a DO2 48.6 mmHg      Barometric Pressure for Blood Gas 732 mmHg      Modality BiPap     FIO2 30 %      Ventilator Mode AVAP     Note --     Collected by      Comment: Meter: [id number]     :  [id number]        pH, Temp Corrected --     pCO2, Temperature Corrected --     pO2, Temperature Corrected --    Phosphorus [[id number]]  (Abnormal) Collected: 08/13/23 0139    Specimen: Blood Updated: 08/13/23 0205     Phosphorus 4.7 mg/dL     Basic Metabolic Panel [[id number]]  (Abnormal) Collected: 08/12/23 1541    Specimen: Blood, Arterial Line Updated: 08/12/23 1700     Glucose 159 mg/dL      BUN 34 mg/dL      Creatinine 0.50 mg/dL      Sodium 148 mmol/L      Potassium 3.9 mmol/L      Chloride 112 mmol/L      CO2 28.2 mmol/L      Calcium 6.6 mg/dL      BUN/Creatinine Ratio 68.0     Anion Gap 7.8 mmol/L      eGFR 97.3 mL/min/1.73     Narrative:      GFR Normal >60  Chronic Kidney Disease <60  Kidney Failure <15    The GFR formula is only valid for adults with stable renal function between ages 18 and 70.    Phosphorus [[id number]]  (Abnormal) Collected: 08/12/23 1541  "   Specimen: Blood, Arterial Line Updated: 23 1627     Phosphorus 1.5 mg/dL     Hemoglobin & Hematocrit, Blood [[id number]]  (Abnormal) Collected: 23 1541    Specimen: Blood, Arterial Line Updated: 23 1616     Hemoglobin 9.2 g/dL      Hematocrit 32.3 %                 Assessment & Plan       Partial small bowel obstruction    Patient stable from a surgical standpoint.  Having issues with significant agitation requiring medication.  Continuing to await bowel function return.  Continue NG tube for now.    Gabe Bowden MD  23  15:58 EDT       Electronically signed by Gabe Bowden MD at 23 1600       Jamal Simon MD at 23 1248          DOS: 2023  NAME: Gabi Dudley   : 1945  PCP: Paul Quinteros MD  Chief Complaint   Patient presents with    Abdominal Pain       Chief complaint: Patient currently on the BiPAP device to help with her breathing problems.  Subjective: Earlier she had interacted with her daughter at the bedside without any issues seems to be improving neurologically.    Objective:  Vital signs: /75   Pulse 73   Temp 97.6 øF (36.4 øC) (Temporal)   Resp 23   Ht 165.1 cm (65\")   Wt 77.2 kg (170 lb 3.1 oz)   SpO2 98%   BMI 28.32 kg/mý    Gen: NAD, vitals reviewed  MS: Unable to assess at this time because she is on the BiPAP to help her sleep and she got a dose of Haldol and Benadryl.  CN: Pupils are equally reacting to light and accommodation.  Extraocular muscles are intact.  Facial muscles seem to be intact.  Grimaces symmetrically to painful stimuli.  Motor: Moves all her extremities.  Sensory: Withdraws to painful stimuli.  Coordination: Unable to test at this time.  Gait: Not weightbearing at this time.    ROS:  General: Patient had respiratory distress for which reason she is currently being followed closely by the intensivist.  Neurological: No focal neurological deficits noted.    Laboratory results:  Lab Results "   Component Value Date    GLUCOSE 150 (H) 08/13/2023    CALCIUM 8.2 (L) 08/13/2023     (H) 08/13/2023    K 3.3 (L) 08/13/2023    CO2 30.7 (H) 08/13/2023     08/13/2023    BUN 31 (H) 08/13/2023    CREATININE 0.72 08/13/2023    EGFRIFAFRI 80 11/16/2021    EGFRIFNONA 98 12/14/2021    BCR 43.1 (H) 08/13/2023    ANIONGAP 13.3 08/13/2023     Lab Results   Component Value Date    WBC 5.17 08/13/2023    HGB 9.6 (L) 08/13/2023    HCT 32.5 (L) 08/13/2023    MCV 81.7 08/13/2023     (L) 08/13/2023     Lab Results   Component Value Date    LDL 74 05/06/2022    LDL 76 11/16/2021    LDL 63 10/27/2020            Review of labs: Potassium is low at 3.3 and the hemoglobin is low at 9.6 and hematocrit is low at 32.5 with a platelet count being low at 133,000.     CMP:        Lab 08/13/23  0352 08/13/23  0139 08/12/23  1541 08/12/23  0412 08/11/23  1533 08/11/23  0359 08/10/23  0342 08/08/23  0523 08/07/23  1535   SODIUM 150*  --  148* 151* 151* 151* 151*   < > 145   POTASSIUM 3.3*  --  3.9 4.3  4.3 3.6 3.9 3.8   < > 4.4   CHLORIDE 106  --  112* 111* 109* 108* 107   < > 102   CO2 30.7*  --  28.2 35.5* 37.9* 38.3* 37.1*   < > 34.8*   ANION GAP 13.3  --  7.8 4.5* 4.1* 4.7* 6.9   < > 8.2   BUN 31*  --  34* 40* 38* 37* 30*   < > 23   CREATININE 0.72  --  0.50* 0.58 0.48* 0.64 0.72   < > 0.65   EGFR 86.8  --  97.3 93.9 98.3 91.7 86.8   < > 91.4   GLUCOSE 150*  --  159* 145* 156* 139* 155*   < > 142*   CALCIUM 8.2*  --  6.6* 7.1* 7.3* 7.4* 7.7*   < > 9.2   MAGNESIUM  --   --   --  2.3  --   --   --   --   --    PHOSPHORUS  --  4.7* 1.5* 0.3*  --   --   --   --   --    TOTAL PROTEIN 5.3*  --   --  5.0*  --  5.7* 5.8*  --  6.9   ALBUMIN 3.2*  --   --  2.9*  --  3.4* 3.6  --  4.3   GLOBULIN 2.1  --   --  2.1  --  2.3 2.2  --  2.6   ALT (SGPT) 21  --   --  16  --  24 9  --  12   AST (SGOT) 19  --   --  14  --  28 14  --  19   BILIRUBIN 0.4  --   --  0.2  --  0.2 0.3  --  0.3   ALK PHOS 53  --   --  42  --  53 45  --  59    LIPASE  --   --   --   --   --   --   --   --  23    < > = values in this interval not displayed.                  Lab Results   Component Value Date    IGSTGVXZ10 433 08/13/2023       Lab Results   Component Value Date    FOLATE 14.90 08/13/2023       Lab Results   Component Value Date    HGBA1C 6.20 (H) 01/09/2017           Review and interpretation of imaging: CT scan of the soft tissues of the neck shows the following:    COMPARISON: None.     FINDINGS: A feeding tube and right jugular deep line are present. The  nasopharynx and oropharynx have an unremarkable appearance. The  epiglottis has an unremarkable appearance. The hypopharynx has an  unremarkable appearance. There is some motion at the level of the larynx  which otherwise appears normal. The thyroid gland is unremarkable.  Moderate bilateral carotid artery calcifications are noted. Mild mucosal  thickening is seen of the right sphenoid sinus. Moderate degenerative  changes are noted of the cervical spine.        IMPRESSION:  No CT evidence of epiglottitis or tonsillitis identified..    CT Head Without Contrast    Result Date: 8/11/2023  PROCEDURE: CT HEAD WO CONTRAST-  HISTORY: Mental status change, unknown cause; K56.600-Partial intestinal obstruction, unspecified as to cause; I48.91-Unspecified atrial fibrillation  COMPARISON: None.  TECHNIQUE: Multiple axial CT images were performed from the foramen magnum to the vertex. Individualized dose reduction techniques using automated exposure control or adjustment of mA and/or kV according to the patient size were employed.  FINDINGS: There is moderate, age-appropriate generalized cerebral atrophy. The ventricles are enlarged, appropriate for atrophy. There is mild asymmetry of the frontal horns of the lateral ventricles. Mild small vessel ischemic disease noted. There is no evidence of edema or hemorrhage.  No masses are identified. No extra-axial fluid is seen. The paranasal sinuses are unremarkable.       Impression: Atrophy  without acute process.       This report was signed and finalized on 8/11/2023 9:49 AM by Angeles Collins MD.         MRI Brain Without Contrast    Result Date: 8/11/2023  FINAL REPORT TECHNIQUE: Multiplanar imaging was performed of the brain without gadolinium infusion. CLINICAL HISTORY: Acute right MCA stroke, AMS, RECENT SURGERY FOR SBO. FINDINGS: Diffusion-weighted images show no evidence of acute infarction. The ventricles are dilated, but proportionate to the degree of generalized atrophy. Periventricular and deep white matter signal abnormality is consistent with changes of chronic small vessel ischemia. There is no mass or shift of midline structures. There is no intracranial hemorrhage. No significant sinus or osseous abnormality is seen. Appropriate signal flow voids are seen in the major intracranial vessels on T2-weighted images.     Impression: No acute intracranial abnormality. Specifically, no infarction is identified on diffusion-weighted imaging. Authenticated and Electronically Signed by Fidel Knapp M.D. on 08/11/2023 07:50:48 PM      Workup to date: CT of the chest without contrast showed the following:    FINDINGS: Axial CT images of the chest were obtained without contrast.  Coronal and sagittal reformatted images were also obtained. This study  was performed with techniques to keep radiation doses as low as  reasonably achievable, (ALARA). Individualized dose reduction techniques  using automated exposure control or adjustment of mA and/or kV according  to the patient size were employed.     A right jugular deep line and feeding tube are present. There is no  evidence of mediastinal mass or adenopathy. No axillary mass or  adenopathy is identified. Mild emphysema is noted. There are new small  nodular opacities in the posterior left upper lobe which are likely  inflammatory. Small bilateral pleural effusions are seen with mild  bibasilar atelectasis. No chest wall  abnormality is identified. Limited  images of the upper abdomen reveal postoperative changes from  cholecystectomy. There are several small nonobstructing renal stones. A  5.5 cm cystic mass is seen at the lateral aspect of the right kidney  with calcification in its wall. A mildly complicated cyst is favored.  There is mild anasarca.        IMPRESSION:  New small nodular opacities in the posterior left upper lobe  which are likely inflammatory.     Small pleural effusions with mild bibasilar atelectasis.     Cystic lateral right renal mass, favor a mildly complicated cyst.    Results for orders placed in visit on 11/02/22    Adult Transthoracic Echo Complete W/ Cont if Necessary Per Protocol    Interpretation Summary  1.  Normal left ventricular size and systolic function, LVEF 60-65%.  2.  Mild concentric LVH.  3.  Normal LV diastolic filling pattern.  4.  Normal right ventricular size and systolic function.  5.  Mildly increased left atrial volume index.  6.  No significant valvular abnormalities.       Diagnoses: Patient with metabolic encephalopathy causing mental status changes as well as strokelike symptoms.      Comment: I reviewed the MRI films and details on the PACS along with the patient's daughter at the bedside and it was discussed that she did not have any acute stroke.    Plan:  1.  To correct underlying metabolic encephalopathy.  2.  To address the ventilatory decompensation at this time.  3.  Patient also has pleural effusions which are being addressed by the hospitalist and intensivist.  4.  To avoid sedatives and narcotics as much as possible.    Discussed with the patient's daughter at the bedside as well as the primary team and I do not have any further suggestions at this time and will be signing off.    Spent a total of 30 minutes in face-to-face evaluation and management of the patient using the dedicated telemedicine device without any interruption with the help of the rounding nurse with  the patient located at the [facility] and myself at a remote location.    Electronically signed by Jamal Simon MD, 23, 12:49 PM EDT.      Electronically signed by Jamal Simon MD at 23 1305       Milton Urban,  at 23 1231                Psychiatric HOSPITALIST    PROGRESS NOTE    Name:  Gabi Dudley   Age:  78 y.o.  Sex:  female  :  1945  MRN:  [id number]   Visit Number:  [id number]  Admission Date:  2023  Date Of Service:  23  Primary Care Physician:  Paul Quinteros MD     LOS: 4 days :    Chief Complaint:      Shortness of air    Subjective:    2023: Patient had an episode of respiratory distress today.  Was trying to come off the BiPAP.  Had some upper airway sounds concerning for stridor.  Was given medications to help BiPAP synchrony including Haldol Benadryl Ativan.  ABG reviewed.  Following this her heart rate normalized from the 160s to the 80s went from atrial fibrillation back to sinus rhythm.  Went down for a CT which ruled out tracheal obstruction.  Updated Dr. Poe on these changes.  Discussed with family.  Hospital course:  2023: Saw patient at the bedside more awake conversant today.  Family had concerns addressed. Dr. Simon saw. MRI reviewed with family at bedside. Patient had increased alertness but also too increased agitation for which I added medication in hopes of controlling this. Not hematuria in martinez bag but good urine output gave 1 u prbc.  2023 patient with disorientation this morning.  Decreased pain medicine dose ordered CT of the head which was negative for acute process.  8/10/2023: Patient converted to sinus rhythm this morning.  Dr. Curry's note reviewed.  Was taken to the OR for adhesiolysis by Dr. Isaac with assist from Dr. Bowdne last evening.  Currently n.p.o. with NG tube in place.  2023 patient with RVR overnight was started on Cardizem but that did not help.   Persistent pain discussed with Dr. Isaac taking  to the OR.  Discussed with Dr. Curry who recommended amiodarone and discussed perioperative risk management with Dr. Isaac.  8/8/2023 Pain not well controlled increased morphine dose from 1 mg to 2 mg    History Of Presenting Illness:       Patient is a chronically ill 78-year-old female history significant for hypertension, hyperlipidemia, depression/anxiety and CAD who presents to the emergency room with 3 to 4 days of progressively worsening abdominal pain.  Patient describes the pain as intermittent and sharp, diffuse across her abdomen.  Patient has chronic constipation so cannot recall her last bowel movement.  Admits to some mild nausea, denies vomiting.  Patient does have a history of small bowel obstruction which required colectomy 2018.  States pain is similar but not as severe as previous episode.  Nothing makes it better or worse.  Patient does admit to still passing gas.  Upon arrival to the ER, patient afebrile hemodynamically stable and nonhypoxic.  CMP unremarkable except for glucose 142.  Lactate lipase normal.  WBC 13, hemoglobin hematocrit at baseline, platelets 112.  UA negative for urinary tract infection.  CT abdomen pelvis revealed mid small bowel dilation worrisome for partial small bowel obstruction.  Dr. Isaac agreed to consult hospital service asked to admit.  Edited by: Milton Urban,  at 8/13/2023 1230     Review of Systems:     All systems were reviewed and negative except as mentioned in subjective, assessment and plan.    Vital Signs:    Temp:  [97 øF (36.1 øC)-99.1 øF (37.3 øC)] 97.6 øF (36.4 øC)  Heart Rate:  [] 64  Resp:  [17-35] 23  BP: (107-189)/() 144/75    Intake and output:    I/O last 3 completed shifts:  In: 3578.6 [I.V.:2820.6; Blood:379; IV Piggyback:379]  Out: 4105 [Urine:3280; Emesis/NG output:825]  I/O this shift:  In: 856 [I.V.:706; IV Piggyback:150]  Out: 75 [Urine:75]    Physical  Examination:    Physical Exam  Vitals reviewed.   Constitutional:       Appearance: Ill-appearing answering questions agitated  HENT:      Head: Normocephalic and atraumatic.      Right Ear: External ear normal.      Left Ear: External ear normal.      Mouth/Throat:      Mouth: Mucous membranes are moist.      Pharynx: Oropharynx is clear.   Eyes:      Extraocular Movements: Extraocular movements intact.      Conjunctiva/sclera: Conjunctivae normal.   Cardiovascular:      Rate and Rhythm: Normal rate and regular rhythm.      Pulses: Normal pulses.      Heart sounds: Normal heart sounds.   Pulmonary:      Effort: Pulmonary effort is labored respiratory rate is increased     Breath sounds: Diminished breath sounds bilaterally with transmitted upper airway sounds  Abdominal:      General: Bowel sounds are improved.  Mild distension.     Comments: No rebound or guarding  Musculoskeletal:         General: Normal range of motion.      Right lower leg: No edema.      Left lower leg: No edema.   Skin:     General: Skin is warm and dry.   Neurological:      Mental Status: Alert was more appropriate cognitively today as compared to yesterday however she was agitated due to her respiratory distress more so than any delirium or hallucinations at the time my exam.  Psychiatric:         Behavior: Behavior appears appropriate given her respiratory distress  Edited by: Milton Urban DO at 8/13/2023 1230     Laboratory results:    Results from last 7 days   Lab Units 08/13/23  0352 08/12/23  1541 08/12/23  0412 08/11/23  1533 08/11/23  0359   SODIUM mmol/L 150* 148* 151*   < > 151*   POTASSIUM mmol/L 3.3* 3.9 4.3  4.3   < > 3.9   CHLORIDE mmol/L 106 112* 111*   < > 108*   CO2 mmol/L 30.7* 28.2 35.5*   < > 38.3*   BUN mg/dL 31* 34* 40*   < > 37*   CREATININE mg/dL 0.72 0.50* 0.58   < > 0.64   CALCIUM mg/dL 8.2* 6.6* 7.1*   < > 7.4*   BILIRUBIN mg/dL 0.4  --  0.2  --  0.2   ALK PHOS U/L 53  --  42  --  53   ALT (SGPT) U/L 21   --  16  --  24   AST (SGOT) U/L 19  --  14  --  28   GLUCOSE mg/dL 150* 159* 145*   < > 139*    < > = values in this interval not displayed.     Results from last 7 days   Lab Units 08/13/23  0352 08/12/23  1541 08/12/23  0412 08/11/23  0359   WBC 10*3/mm3 5.17  --  3.96 8.94   HEMOGLOBIN g/dL 9.6* 9.2* 7.3* 9.3*   HEMATOCRIT % 32.5* 32.3* 27.7* 34.9   PLATELETS 10*3/mm3 133*  --  115* 174                 Recent Labs     08/12/23  0717 08/13/23  0339 08/13/23  1030   PHART 7.441 7.428 7.388   BQI5LIE 54.5* 45.0 49.9*   PO2ART 131.0* 107.0* 112.0*   WEC4BDI 37.1* 29.7* 30.1*   BASEEXCESS 11.7* 4.8* 4.2*      I have reviewed the patient's laboratory results.    Radiology results:    XR Neck Soft Tissue    Result Date: 8/13/2023  Neck soft tissue  HISTORY: Stridor  FINDINGS: 2 views of the neck were obtained. The base of the neck is obscured by the shoulders on the lateral view. A right jugular deep line is present. A nasogastric tube is also noted. The visualized airway appears normal. The epiglottis appears normal.      Impression: No focal airway abnormality identified.  This report was signed and finalized on 8/13/2023 12:08 PM by Maximo Epps MD.      CT Soft Tissue Neck Without Contrast    Result Date: 8/13/2023  PROCEDURE: CT SOFT TISSUE NECK WO CONTRAST-  HISTORY: Epiglottitis or tonsillitis suspected; K56.600-Partial intestinal obstruction, unspecified as to cause; I48.91-Unspecified atrial fibrillation  TECHNIQUE:  Thin section axial CT images were obtained through the neck without intravenous contrast administration. Sagittal and coronal reformatted images were also obtained. This study was performed with techniques to keep radiation doses as low as reasonably achievable, (ALARA). Individualized dose reduction techniques using automated exposure control or adjustment of mA and/or kV according to the patient size were employed.  COMPARISON: None.  FINDINGS: A feeding tube and right jugular deep line are  present. The nasopharynx and oropharynx have an unremarkable appearance. The epiglottis has an unremarkable appearance. The hypopharynx has an unremarkable appearance. There is some motion at the level of the larynx which otherwise appears normal. The thyroid gland is unremarkable. Moderate bilateral carotid artery calcifications are noted. Mild mucosal thickening is seen of the right sphenoid sinus. Moderate degenerative changes are noted of the cervical spine.       Impression: No CT evidence of epiglottitis or tonsillitis identified..       This report was signed and finalized on 8/13/2023 11:46 AM by Maximo Epps MD.      CT Chest Without Contrast Diagnostic    Result Date: 8/13/2023  PROCEDURE: CT CHEST WO CONTRAST DIAGNOSTIC-  HISTORY: Abnormal xray - lung opacity/opacities; K56.600-Partial intestinal obstruction, unspecified as to cause; I48.91-Unspecified atrial fibrillation  COMPARISON: July 20, 2023 CT  FINDINGS: Axial CT images of the chest were obtained without contrast. Coronal and sagittal reformatted images were also obtained. This study was performed with techniques to keep radiation doses as low as reasonably achievable, (ALARA). Individualized dose reduction techniques using automated exposure control or adjustment of mA and/or kV according to the patient size were employed.  A right jugular deep line and feeding tube are present. There is no evidence of mediastinal mass or adenopathy. No axillary mass or adenopathy is identified. Mild emphysema is noted. There are new small nodular opacities in the posterior left upper lobe which are likely inflammatory. Small bilateral pleural effusions are seen with mild bibasilar atelectasis. No chest wall abnormality is identified. Limited images of the upper abdomen reveal postoperative changes from cholecystectomy. There are several small nonobstructing renal stones. A 5.5 cm cystic mass is seen at the lateral aspect of the right kidney with calcification  in its wall. A mildly complicated cyst is favored. There is mild anasarca.       Impression: New small nodular opacities in the posterior left upper lobe which are likely inflammatory.  Small pleural effusions with mild bibasilar atelectasis.  Cystic lateral right renal mass, favor a mildly complicated cyst.    This report was signed and finalized on 8/13/2023 11:42 AM by Maximo Epps MD.      MRI Brain Without Contrast    Result Date: 8/11/2023  FINAL REPORT TECHNIQUE: Multiplanar imaging was performed of the brain without gadolinium infusion. CLINICAL HISTORY: Acute right MCA stroke, AMS, RECENT SURGERY FOR SBO. FINDINGS: Diffusion-weighted images show no evidence of acute infarction. The ventricles are dilated, but proportionate to the degree of generalized atrophy. Periventricular and deep white matter signal abnormality is consistent with changes of chronic small vessel ischemia. There is no mass or shift of midline structures. There is no intracranial hemorrhage. No significant sinus or osseous abnormality is seen. Appropriate signal flow voids are seen in the major intracranial vessels on T2-weighted images.     Impression: No acute intracranial abnormality. Specifically, no infarction is identified on diffusion-weighted imaging. Authenticated and Electronically Signed by Fidel Knapp M.D. on 08/11/2023 07:50:48 PM    XR Chest 1 View    Result Date: 8/13/2023  PROCEDURE: XR CHEST 1 VW-  HISTORY: sob; K56.600-Partial intestinal obstruction, unspecified as to cause; I48.91-Unspecified atrial fibrillation   COMPARISON: August 12, 2023.  FINDINGS: Cardiomegaly is noted. A nasogastric tube and right jugular deep line remain in place. There are persistent mild left lung base opacities, favor atelectasis. There is no pneumothorax. The bony thorax in intact.      Impression: Stable mild left lung base opacities, favor atelectasis.    This report was signed and finalized on 8/13/2023 11:48 AM by Maximo Epps MD.       XR Chest 1 View    Result Date: 8/12/2023  FINAL REPORT CLINICAL HISTORY: sob FINDINGS: A right IJ catheter is in good position.  An NG tube passes below the diaphragm. The heart is normal in size. The mediastinum is unremarkable. Prominent perihilar interstitial changes are noted, likely secondary to vascular congestion. There is no pneumothorax. Osseous structures unremarkable.     Impression: Prominent interstitial changes are likely secondary to vascular congestion.  Continued followup recommended. Authenticated and Electronically Signed by Jasiel Browne MD on 08/12/2023 05:31:09 PM   I have reviewed the patient's radiology reports.    Medication Review:     I have reviewed the patient's active and prn medications.     Problem List:      Partial small bowel obstruction      Assessment/Plan:    Partial small bowel obstruction due to volvulus.    History of colectomy due to obstruction in 2018  Paroxysmal A-fib with RVR   Hypertension  Hyperlipidemia  Depression/anxiety  GERD  Delirium    -Status post adhesiolysis 8/9/2023 Dr. Isaac  -NG tube in place, IV fluids on hold for concern for increasing volume overload.  -Continue IV amiodarone per Dr. Curry's recommendations transition to oral once tolerating  -appreciate Neurology recommendations, no focal deficits now communicating, however with hyperactive delirium, giving haldol, benadryl, ativan, precedex attempting to calm her agitation.  -Started racemic epinephrine increased her IV Solu-Medrol dose.  Continuing IV antibiotics.  Redosed with Diuril.  8/13/2023.  -Hematuria resolved.  Suspect was traumatic due to agitation and pulling at lines.            Edited by: Milton Urban, DO at 8/13/2023 1231       DVT Prophylaxis: Lovenox treatment dose  Code Status:   Code Status and Medical Interventions:   Ordered at: 08/07/23 2023     Code Status (Patient has no pulse and is not breathing):    CPR (Attempt to Resuscitate)     Medical Interventions  (Patient has pulse or is breathing):    Full Support     Release to patient:    Routine Release      Diet:   Dietary Orders (From admission, onward)       Start     Ordered    08/07/23 2023  NPO Diet NPO Type: Strict NPO  Diet Effective Now        Question:  NPO Type  Answer:  Strict NPO    08/07/23 2023                   Discharge Plan: Disposition: Anticipate short-term rehab discharge in 3 days    Milton Urban DO  08/13/23  12:31 EDT    Dictated utilizing Dragon dictation.      Electronically signed by Milton Urban DO at 08/13/23 1232          Physical Therapy Notes (last 48 hours)        Bebeto David, PTA at 08/14/23 1139  Version 1 of 1         Pt held per MD. Will f/u with pt tomorrow.      Electronically signed by Bebeto David, PTA at 08/14/23 1201       Occupational Therapy Notes (last 48 hours)  Notes from 08/12/23 1214 through 08/14/23 1214   No notes exist for this encounter.

## 2023-08-14 NOTE — PROGRESS NOTES
LOS: 5 days   Patient Care Team:  Paul Quinteros MD as PCP - General (Internal Medicine)  Sima Moses MD as Consulting Physician (General Surgery)  Taiwo Curry MD as Consulting Physician (Cardiology)  Soledad Stauffer, RN as Ambulatory  ([facility])       Chief Complaint: POD5 exploratory laparotomy, adhesiolysis, reduction of small bowel volvulus    HPI: Patient seen and examined at bedside in ICU. Patient has had issues with agitation over the weekend. Currently she is calm and answering questions appropriately. She has also continued to have issues from a respiratory standpoint and has been BiPAP dependent. NG in place, output decreasing. Per nursing, patient had large bowel movement this AM and is passing flatus. Denies abdominal pain.    Vital Signs  Temp:  [97 øF (36.1 øC)-97.7 øF (36.5 øC)] 97 øF (36.1 øC)  Heart Rate:  [] 101  Resp:  [12-25] 22  BP: (162-207)/() 169/84    Physical Exam:     General Appearance:  Alert and cooperative, not in any acute distress.   Respiratory/Lungs:   BiPAP   Cardiovascular/Heart:  Normal S1 and S2, no murmur. No edema   GI/Abdomen:   Soft, non-tender, non-distended, hypoactive bowel sounds, but now present in lower quadrants too. Midline incision staples in place, wound is clean and dry, some surrounding ecchymosis                 Musculoskeletal/ Extremities:   Moves all extremities well   Skin: Diffuse bruises consistent with previous falls at home and DVT ppx   Psychiatric : Alert and oriented x2. Answers questions appropriately.   Neurologic: Cranial nerves 2 - 12 grossly intact, sensation intact, Motor power is normal and equal bilaterally.     Results Review:       Lab Results (last 24 hours)       Procedure Component Value Units Date/Time    Vancomycin, Random [[id number]]  (Normal) Collected: 08/14/23 0427    Specimen: Blood Updated: 08/14/23 0559     Vancomycin Random 10.10 mcg/mL     Narrative:      Therapeutic Ranges  for Vancomycin    Vancomycin Random   5.0-40.0 mcg/mL  Vancomycin Trough   5.0-20.0 mcg/mL  Vancomycin Peak     20.0-40.0 mcg/mL    Comprehensive Metabolic Panel [[id number]]  (Abnormal) Collected: 08/14/23 0427    Specimen: Blood Updated: 08/14/23 0559     Glucose 125 mg/dL      BUN 30 mg/dL      Creatinine 0.59 mg/dL      Sodium 148 mmol/L      Potassium 3.4 mmol/L      Chloride 107 mmol/L      CO2 31.6 mmol/L      Calcium 7.8 mg/dL      Total Protein 5.4 g/dL      Albumin 3.5 g/dL      ALT (SGPT) 22 U/L      AST (SGOT) 21 U/L      Alkaline Phosphatase 48 U/L      Total Bilirubin 0.4 mg/dL      Globulin 1.9 gm/dL      A/G Ratio 1.8 g/dL      BUN/Creatinine Ratio 50.8     Anion Gap 9.4 mmol/L      eGFR 93.5 mL/min/1.73     Narrative:      GFR Normal >60  Chronic Kidney Disease <60  Kidney Failure <15    The GFR formula is only valid for adults with stable renal function between ages 18 and 70.    CBC (No Diff) [[id number]]  (Abnormal) Collected: 08/14/23 0427    Specimen: Blood Updated: 08/14/23 0550     WBC 8.02 10*3/mm3      RBC 3.94 10*6/mm3      Hemoglobin 9.3 g/dL      Hematocrit 32.3 %      MCV 82.0 fL      MCH 23.6 pg      MCHC 28.8 g/dL      RDW 20.9 %      RDW-SD 62.5 fl      MPV 11.8 fL      Platelets 172 10*3/mm3     Potassium [[id number]]  (Abnormal) Collected: 08/14/23 0216    Specimen: Blood Updated: 08/14/23 0302     Potassium 3.4 mmol/L     MRSA Screen, PCR (Inpatient) - Swab, Nares [[id number]]  (Normal) Collected: 08/13/23 1425    Specimen: Swab from Nares Updated: 08/13/23 2151     MRSA PCR No MRSA Detected    Narrative:      The negative predictive value of this diagnostic test is high and should only be used to consider de-escalating anti-MRSA therapy. A positive result may indicate colonization with MRSA and must be correlated clinically.    Basic Metabolic Panel [[id number]]  (Abnormal) Collected: 08/13/23 1350    Specimen: Blood Updated: 08/13/23 1413     Glucose 150 mg/dL      BUN 31 mg/dL       Creatinine 0.58 mg/dL      Sodium 149 mmol/L      Potassium 3.4 mmol/L      Chloride 110 mmol/L      CO2 28.7 mmol/L      Calcium 7.6 mg/dL      BUN/Creatinine Ratio 53.4     Anion Gap 10.3 mmol/L      eGFR 93.9 mL/min/1.73     Narrative:      GFR Normal >60  Chronic Kidney Disease <60  Kidney Failure <15    The GFR formula is only valid for adults with stable renal function between ages 18 and 70.    Folate [[id number]]  (Normal) Collected: 08/13/23 0352    Specimen: Blood Updated: 08/13/23 1233     Folate 14.90 ng/mL     Narrative:      Results may be falsely increased if patient taking Biotin.      Vitamin B12 [[id number]]  (Normal) Collected: 08/13/23 0352    Specimen: Blood Updated: 08/13/23 1233     Vitamin B-12 433 pg/mL     Narrative:      Results may be falsely increased if patient taking Biotin.      Blood Gas, Arterial With Co-Ox [[id number]]  (Abnormal) Collected: 08/13/23 1030    Specimen: Arterial Blood Updated: 08/13/23 1030     Site Arterial Line     Glenn's Test N/A     pH, Arterial 7.388 pH units      pCO2, Arterial 49.9 mm Hg      Comment: 83 Value above reference range        pO2, Arterial 112.0 mm Hg      Comment: 83 Value above reference range        HCO3, Arterial 30.1 mmol/L      Comment: 83 Value above reference range        Base Excess, Arterial 4.2 mmol/L      Comment: 83 Value above reference range        O2 Saturation, Arterial 97.0 %      Hematocrit, Blood Gas 33.0 %      Comment: 84 Value below reference range        Oxyhemoglobin 96.0 %      Methemoglobin 0.60 %      Carboxyhemoglobin 0.4 %      A-a DO2 38.8 mmHg      Barometric Pressure for Blood Gas 734 mmHg      Modality BiPap     FIO2 30 %      Ventilator Mode NA     Comment: Meter: [id number]     :  [id number]        Note --     pH, Temp Corrected --     pCO2, Temperature Corrected --     pO2, Temperature Corrected --                Assessment & Plan       Partial small bowel obstruction    Patient is POD5 from  exploratory laparotomy, adhesiolysis, reduction of small bowel volvulus. Her abdominal pain is minimal at this time. Minimize use of opiates as much as possible. She had a bowel movement this AM and is passing flatus. PO meds okay to be restarted down NG. If continued bowel function, can DC NG tomorrow. Okay with sips for comfort.     Bianca Isaac DO  08/14/23  10:30 EDT

## 2023-08-14 NOTE — PLAN OF CARE
Goal Outcome Evaluation:  Plan of Care Reviewed With: patient, family        Progress: improving  Outcome Evaluation: Pt had 3 bms today. NGT output 100ml. Up to chair for approx 1 hr. Home meds restarted today. Pt denies pain. Incision with staples- moderate serosang drainage noted. Amio drip stopped. Pt has had a lot of anxiety today, but has been able to take a couple naps also.

## 2023-08-14 NOTE — PROGRESS NOTES
"  CC: Acute Respiratory Failure.  Acute encephalopathy    S: Currently on PAP therapy.  Appears more awake and alert.  Was able to answer some questions appropriately.    ROS: Could not be reliably obtained as the patient is on PAP therapy.     O:Vital signs reviewed. FiO2: 30 %.    /84   Pulse 79   Temp 97 øF (36.1 øC) (Temporal)   Resp 22   Ht 165.1 cm (65\")   Wt 76.1 kg (167 lb 12.3 oz)   SpO2 98%   BMI 27.92 kg/mý     Temp (24hrs), Av.4 øF (36.3 øC), Min:97 øF (36.1 øC), Max:97.7 øF (36.5 øC)      I & Os reviewed.   Intake/Output         23 0700 - 23 0659 23 0700 - 08/15/23 0659    Intake (ml) 1558.8 16.7    Output (ml) 1675 125    Net (ml) -116.2 -108.3    Last Weight 76.1 kg (167 lb 12.3 oz) --            Net IO Since Admission: 10,235.47 mL [23 0916]      CVP Line. Day # 7.   NG/OG tube present.     General/Constitutional: On PAP therapy. Appears to be in no obvious distress.  Eyes: PERRL.   Neck: Supple without JVD. No obvious masses noted.   Cardiovascular: S1 + S2.  Regular.  Lungs/Respiratory: Transmitted Breath sounds noted.  Mild, somewhat diffuse, wheezing heard.  Bibasilar crackles noted  GI/Abdomen: Postsurgical.  Soft. Bowel sounds hypoactive.  No obvious organomegaly noted.  Musculoskeletal/Extremities: Trace edema noted. Gait could not be assessed at this time, as the patient was laying in bed.   Neurologic: Was able to follow commands. Detailed exam couldn't be performed due to her being on PAP therapy.   Psych: AAOx3.   Skin: Appeared somewhat dry       Labs: Reviewed.   Results from last 7 days   Lab Units 23  0427 23  0352 23  1541 23  0412 23  0359 08/10/23  0342 23  0701 23  0523 23  1535   WBC 10*3/mm3 8.02 5.17  --  3.96 8.94 6.29   < > 11.28* 12.90*   HEMOGLOBIN g/dL 9.3* 9.6* 9.2* 7.3* 9.3* 9.6*   < > 8.5* 8.8*   HEMATOCRIT % 32.3* 32.5* 32.3* 27.7* 34.9 35.6   < > 31.8* 33.9*   PLATELETS 10*3/mm3 " 172 133*  --  115* 174 172   < > 160 112*   NEUTROPHIL % %  --   --   --   --   --   --   --  93.2* 84.5*   NEUTROS ABS 10*3/mm3  --   --   --   --   --   --   --  10.52* 10.90*   EOSINOPHIL % %  --   --   --   --   --   --   --  0.1* 0.4   EOS ABS 10*3/mm3  --   --   --   --   --   --   --  0.01 0.05   LYMPHOCYTE % %  --   --   --   --   --   --   --  3.1* 9.6*   LYMPHS ABS 10*3/mm3  --   --   --   --   --   --   --  0.35* 1.24    < > = values in this interval not displayed.       Lab Results   Component Value Date    PROCALCITO 0.11 08/09/2023    PROCALCITO 0.05 07/20/2023    PROCALCITO 0.07 05/24/2023       No results found for: CRP    No results found for: SEDRATE    Lab Results   Component Value Date    PROBNP 1,694.0 07/30/2023    PROBNP 1,543.0 07/20/2023    PROBNP 1,664.0 05/22/2023       Results from last 7 days   Lab Units 08/14/23  0427 08/14/23  0216 08/13/23  1350 08/13/23  0352 08/12/23  1541 08/12/23  0412   SODIUM mmol/L 148*  --  149* 150*   < > 151*   POTASSIUM mmol/L 3.4* 3.4* 3.4* 3.3*   < > 4.3  4.3   CHLORIDE mmol/L 107  --  110* 106   < > 111*   CO2 mmol/L 31.6*  --  28.7 30.7*   < > 35.5*   BUN mg/dL 30*  --  31* 31*   < > 40*   CREATININE mg/dL 0.59  --  0.58 0.72   < > 0.58   CALCIUM mg/dL 7.8*  --  7.6* 8.2*   < > 7.1*   ANION GAP mmol/L 9.4  --  10.3 13.3   < > 4.5*   BILIRUBIN mg/dL 0.4  --   --  0.4  --  0.2   ALK PHOS U/L 48  --   --  53  --  42   ALT (SGPT) U/L 22  --   --  21  --  16   AST (SGOT) U/L 21  --   --  19  --  14   GLUCOSE mg/dL 125*  --  150* 150*   < > 145*   TOTAL PROTEIN g/dL 5.4*  --   --  5.3*  --  5.0*   ALBUMIN g/dL 3.5  --   --  3.2*  --  2.9*    < > = values in this interval not displayed.       Results from last 7 days   Lab Units 08/13/23  0139 08/12/23  1541 08/12/23  0412   MAGNESIUM mg/dL  --   --  2.3   PHOSPHORUS mg/dL 4.7* 1.5* 0.3*             Lab Results   Component Value Date    CKTOTAL 126 11/16/2021    CKTOTAL 121 10/27/2020    CKTOTAL 77 11/18/2019        No components found for: HSTROPT    Lab Results   Component Value Date    TROPONINT 22 (H) 07/30/2023    TROPONINT 23 (H) 07/30/2023    TROPONINT 17 (H) 07/20/2023       No results found for: DDIMER    Brief Urine Lab Results  (Last result in the past 365 days)        Color   Clarity   Blood   Leuk Est   Nitrite   Protein   CREAT   Urine HCG        08/11/23 0847             105.2                 Lab Results   Component Value Date    TSH 0.633 05/06/2022    TSH 2.120 11/16/2021    TSH 2.910 10/27/2020       No results found for: FREET4      Micro: As of August 14, 2023   No results found for: RESPCX  No results found for: BCIDPCR  Lab Results   Component Value Date    BLOODCX No growth at 5 days 05/22/2023    BLOODCX No growth at 5 days 05/22/2023    BLOODCX No growth at 5 days 11/21/2022     Lab Results   Component Value Date    URINECX No growth 06/05/2017     No results found for: MRSACX  Lab Results   Component Value Date    MRSAPCR No MRSA Detected 08/13/2023    MRSAPCR MRSA Detected (A) 05/23/2023     No results found for: URCX  No components found for: LOWRESPCF  No results found for: THROATCX  No results found for: CULTURES  No components found for: STREPBCX  No results found for: STREPPNEUAG  No results found for: LEGIONELLA  No results found for: MYCOPLASCX  No results found for: GCCX  No results found for: WOUNDCX  No results found for: BODYFLDCX    Lab Results   Component Value Date    FLU Negative 01/22/2018    FLU Negative 01/22/2018       Lab Results   Component Value Date    ADENOVIRUS Not Detected 03/27/2023     Lab Results   Component Value Date    VY577K Not Detected 01/23/2023     Lab Results   Component Value Date    CVHKU1 Not Detected 01/23/2023     Lab Results   Component Value Date    CVNL63 Not Detected 01/23/2023     Lab Results   Component Value Date    CVOC43 Not Detected 01/23/2023     Lab Results   Component Value Date    HUMETPNEVS Not Detected 01/23/2023     Lab Results    Component Value Date    HURVEV Not Detected 01/23/2023     Lab Results   Component Value Date    FLUBPCR Not Detected 07/20/2023     Lab Results   Component Value Date    PARAINFLUE Not Detected 01/23/2023     Lab Results   Component Value Date    PARAFLUV2 Not Detected 01/23/2023     Lab Results   Component Value Date    PARAFLUV3 Not Detected 01/23/2023     Lab Results   Component Value Date    PARAFLUV4 Not Detected 01/23/2023     Lab Results   Component Value Date    BPERTPCR Not Detected 01/23/2023     No results found for: [id number]  Lab Results   Component Value Date    CPNEUPCR Not Detected 01/23/2023     Lab Results   Component Value Date    MPNEUMO Not Detected 01/23/2023     Lab Results   Component Value Date    FLUAPCR Not Detected 07/20/2023     No results found for: FLUAH3  No results found for: FLUAH1  Lab Results   Component Value Date    RSV Not Detected 01/23/2023     Lab Results   Component Value Date    BPARAPCR Not Detected 01/23/2023       COVID 19:  Lab Results   Component Value Date    COVID19 Not Detected 07/20/2023           ABG: Reviewed  Recent Labs     08/12/23  0717 08/13/23  0339 08/13/23  1030   PHART 7.441 7.428 7.388   XKY0HAH 54.5* 45.0 49.9*   PO2ART 131.0* 107.0* 112.0*   YUP8JOR 37.1* 29.7* 30.1*   BASEEXCESS 11.7* 4.8* 4.2*         Lab Results   Component Value Date    LACTATE 1.6 08/10/2023    LACTATE 0.8 08/09/2023    LACTATE 1.0 08/07/2023         Echo: Results for orders placed in visit on 11/02/22    Adult Transthoracic Echo Complete W/ Cont if Necessary Per Protocol    Interpretation Summary  1.  Normal left ventricular size and systolic function, LVEF 60-65%.  2.  Mild concentric LVH.  3.  Normal LV diastolic filling pattern.  4.  Normal right ventricular size and systolic function.  5.  Mildly increased left atrial volume index.  6.  No significant valvular abnormalities.      Results for orders placed during the hospital encounter of 06/15/20    Adult Transthoracic  Echo Complete W/ Cont if Necessary Per Protocol    Interpretation Summary  1.  Normal left ventricular size with low normal LV systolic function, LVEF 50-55%.  2.  Mild concentric LVH.  3.  Normal LV diastolic filling pattern.  4.  Mild right ventricular dilation with normal RV systolic function.  5.  Normal left and right atrial size.  6.  Trace MR and TR.        amiodarone, 0.5 mg/min, Last Rate: 0.5 mg/min (08/14/23 0700)  Pharmacy to Dose enoxaparin (LOVENOX),   Pharmacy to dose vancomycin,   Pharmacy to Dose Zosyn,         CXRay: Latest imaging study was reviewed personally.   Imaging Results (Last 24 Hours)       Procedure Component Value Units Date/Time    XR Neck Soft Tissue [[id number]] Collected: 08/13/23 1206     Updated: 08/13/23 1210    Narrative:      Neck soft tissue     HISTORY: Stridor     FINDINGS: 2 views of the neck were obtained. The base of the neck is  obscured by the shoulders on the lateral view. A right jugular deep line  is present. A nasogastric tube is also noted. The visualized airway  appears normal. The epiglottis appears normal.       Impression:      No focal airway abnormality identified.     This report was signed and finalized on 8/13/2023 12:08 PM by Maximo Epps MD.       XR Chest 1 View [[id number]] Collected: 08/13/23 1148     Updated: 08/13/23 1150    Narrative:      PROCEDURE: XR CHEST 1 VW-     HISTORY: sob; K56.600-Partial intestinal obstruction, unspecified as to  cause; I48.91-Unspecified atrial fibrillation        COMPARISON: August 12, 2023.     FINDINGS: Cardiomegaly is noted. A nasogastric tube and right jugular  deep line remain in place. There are persistent mild left lung base  opacities, favor atelectasis. There is no pneumothorax. The bony thorax  in intact.        Impression:      Stable mild left lung base opacities, favor atelectasis.           This report was signed and finalized on 8/13/2023 11:48 AM by Maximo Epps MD.       CT Soft Tissue Neck  Without Contrast [[id number]] Collected: 08/13/23 1142     Updated: 08/13/23 1148    Narrative:      PROCEDURE: CT SOFT TISSUE NECK WO CONTRAST-     HISTORY: Epiglottitis or tonsillitis suspected; K56.600-Partial  intestinal obstruction, unspecified as to cause; I48.91-Unspecified  atrial fibrillation     TECHNIQUE:  Thin section axial CT images were obtained through the neck  without intravenous contrast administration. Sagittal and coronal  reformatted images were also obtained. This study was performed with  techniques to keep radiation doses as low as reasonably achievable,  (ALARA). Individualized dose reduction techniques using automated  exposure control or adjustment of mA and/or kV according to the patient  size were employed.     COMPARISON: None.     FINDINGS: A feeding tube and right jugular deep line are present. The  nasopharynx and oropharynx have an unremarkable appearance. The  epiglottis has an unremarkable appearance. The hypopharynx has an  unremarkable appearance. There is some motion at the level of the larynx  which otherwise appears normal. The thyroid gland is unremarkable.  Moderate bilateral carotid artery calcifications are noted. Mild mucosal  thickening is seen of the right sphenoid sinus. Moderate degenerative  changes are noted of the cervical spine.          Impression:      No CT evidence of epiglottitis or tonsillitis identified..                    This report was signed and finalized on 8/13/2023 11:46 AM by Maximo Epps MD.       CT Chest Without Contrast Diagnostic [[id number]] Collected: 08/13/23 1139     Updated: 08/13/23 1144    Narrative:      PROCEDURE: CT CHEST WO CONTRAST DIAGNOSTIC-     HISTORY: Abnormal xray - lung opacity/opacities; K56.600-Partial  intestinal obstruction, unspecified as to cause; I48.91-Unspecified  atrial fibrillation     COMPARISON: July 20, 2023 CT     FINDINGS: Axial CT images of the chest were obtained without contrast.  Coronal and sagittal  reformatted images were also obtained. This study  was performed with techniques to keep radiation doses as low as  reasonably achievable, (ALARA). Individualized dose reduction techniques  using automated exposure control or adjustment of mA and/or kV according  to the patient size were employed.     A right jugular deep line and feeding tube are present. There is no  evidence of mediastinal mass or adenopathy. No axillary mass or  adenopathy is identified. Mild emphysema is noted. There are new small  nodular opacities in the posterior left upper lobe which are likely  inflammatory. Small bilateral pleural effusions are seen with mild  bibasilar atelectasis. No chest wall abnormality is identified. Limited  images of the upper abdomen reveal postoperative changes from  cholecystectomy. There are several small nonobstructing renal stones. A  5.5 cm cystic mass is seen at the lateral aspect of the right kidney  with calcification in its wall. A mildly complicated cyst is favored.  There is mild anasarca.          Impression:      New small nodular opacities in the posterior left upper lobe  which are likely inflammatory.     Small pleural effusions with mild bibasilar atelectasis.     Cystic lateral right renal mass, favor a mildly complicated cyst.           This report was signed and finalized on 8/13/2023 11:42 AM by Maximo Epps MD.                 Assessment & Recommendations/Plan:   1.  Acute hypercarbic respiratory Failure.  Reviewed multiple ABGs from the past few days  ABGs have definitely improved overall with compensated respiratory acidosis noted  We will continue NIV device at the current settings  Will ask case management to obtain settings on the patient's home NIV device.    2.  Acute encephalopathy   Somewhat improved  Was likely multifactorial  There definitely appears to be a component of delirium, likely due to critical illness  There may be a component of steroid causing an element of  psychosis as well  Currently off Precedex drip  There definitely appears to be a component of withdrawal from multiple SSRIs that she is on at home including Cymbalta, Seroquel and trazodone  May be beneficial if this can be resumed, once general surgery okays the use of NG tube    3.  Severe COPD   Last FEV1 of 31% predicted  Continue nebulized treatments  Apparently is on Breztri at home.  Will change Solu-Medrol to 60 mg daily  Once the patient can have oral intake, will consider switching to oral prednisone    4.  Chronic respiratory acidosis  Does appear to have some issues with noncompliance, with NIV device, at home.  May need to make some adjustments, after the settings are reviewed    5.  Anemia.    Hemoglobin has remained stable status post transfusion  Continue close monitoring    6.  Abnormal CT of the chest   Likely inflammatory  Due to significant COPD and history of smoking, may need to be followed outpatient with repeat CT in 8-12 weeks or so.  This will be discussed/decided close to discharge    7.  Heavy ex-smoker  Quit smoking a few years ago    8.  Hypernatremia  Slowly improving  Continue close follow-up    9.  Atrial fibrillation  Cardiology has evaluated the patient  On Lovenox  On amiodarone    10.  Fluid status  Will administer Diuril 500 mg daily for the next 3 days  Net IO Since Admission: 10,305.47 mL [08/14/23 1227]  Lab Results   Component Value Date    PROBNP 1,694.0 07/30/2023    PROBNP 1,543.0 07/20/2023    PROBNP 1,664.0 05/22/2023     11.  GI/nutrition  Nutrition per Dietician.   GI prophylaxis per admitting attending.    12.  DVT prophylaxis  Per admitting attending.    Critical Care time spent in direct patient care: 40 minutes including high complexity decision making to assess, manipulate, and support vital organ system failure in this individual who has impairment of one or more vital organ systems such that there is a high probability of imminent or life threatening  deterioration in the patient's condition.  This time includes multiple reassessments throughout the day, if needed and as appropriate.  This time excludes other billable procedures. Time does include preparation of documents, review of old records, coordinating care with other providers and direct bedside care.    We have reviewed patient's current orders and changes, if any, have been suggested to primary care team. Plan was also discussed with nursing staff, as necessary.     This document was electronically signed by Herbert Poe MD on 08/14/23 at 09:16 EDT      Dictated utilizing Dragon dictation.

## 2023-08-14 NOTE — PROGRESS NOTES
"Nutrition Services    Patient Name:  Gabi Dudley  YOB: 1945  MRN: [id number]  Admit Date:  8/7/2023    NPO x 7 days. NGT in place for suction. Per Dr. Bowden documentation yesterday, \"Continuing to await bowel function return. Continue NG tube for now\". If unable to advance diet, would plan to discuss initiation of PN. RD to F/U. Available PRN.     Electronically signed by:  Irma Nugent RD  08/14/23 09:26 EDT   "

## 2023-08-15 ENCOUNTER — APPOINTMENT (OUTPATIENT)
Dept: GENERAL RADIOLOGY | Facility: HOSPITAL | Age: 78
DRG: 329 | End: 2023-08-15
Payer: MEDICARE

## 2023-08-15 LAB
A-A DO2: ABNORMAL
ALBUMIN SERPL-MCNC: 3.4 G/DL (ref 3.5–5.2)
ALBUMIN/GLOB SERPL: 1.7 G/DL
ALP SERPL-CCNC: 48 U/L (ref 39–117)
ALT SERPL W P-5'-P-CCNC: 22 U/L (ref 1–33)
ANION GAP SERPL CALCULATED.3IONS-SCNC: 9.3 MMOL/L (ref 5–15)
ARTERIAL PATENCY WRIST A: ABNORMAL
AST SERPL-CCNC: 22 U/L (ref 1–32)
ATMOSPHERIC PRESS: 731 MMHG
BASE EXCESS BLDA CALC-SCNC: 5 MMOL/L (ref 0–2)
BDY SITE: ABNORMAL
BILIRUB SERPL-MCNC: 0.6 MG/DL (ref 0–1.2)
BUN SERPL-MCNC: 20 MG/DL (ref 8–23)
BUN/CREAT SERPL: 30.3 (ref 7–25)
CALCIUM SPEC-SCNC: 7.7 MG/DL (ref 8.6–10.5)
CHLORIDE SERPL-SCNC: 107 MMOL/L (ref 98–107)
CO2 SERPL-SCNC: 28.7 MMOL/L (ref 22–29)
COHGB MFR BLD: 0.3 % (ref 0–2)
CREAT SERPL-MCNC: 0.66 MG/DL (ref 0.57–1)
DEPRECATED RDW RBC AUTO: 63.7 FL (ref 37–54)
EGFRCR SERPLBLD CKD-EPI 2021: 91 ML/MIN/1.73
ERYTHROCYTE [DISTWIDTH] IN BLOOD BY AUTOMATED COUNT: 21.4 % (ref 12.3–15.4)
GAS FLOW AIRWAY: 3 LPM
GLOBULIN UR ELPH-MCNC: 2 GM/DL
GLUCOSE SERPL-MCNC: 81 MG/DL (ref 65–99)
HCO3 BLDA-SCNC: 31.5 MMOL/L (ref 22–28)
HCT VFR BLD AUTO: 34 % (ref 34–46.6)
HCT VFR BLD CALC: 30.9 %
HGB BLD-MCNC: 9.8 G/DL (ref 12–15.9)
Lab: ABNORMAL
MCH RBC QN AUTO: 23.7 PG (ref 26.6–33)
MCHC RBC AUTO-ENTMCNC: 28.8 G/DL (ref 31.5–35.7)
MCV RBC AUTO: 82.3 FL (ref 79–97)
METHGB BLD QL: 0.6 % (ref 0–1.5)
MODALITY: ABNORMAL
NOTE: ABNORMAL
NT-PROBNP SERPL-MCNC: ABNORMAL PG/ML (ref 0–1800)
OXYHGB MFR BLDV: 97.7 % (ref 94–99)
PCO2 BLDA: 55.3 MM HG (ref 35–45)
PCO2 TEMP ADJ BLD: ABNORMAL MM[HG]
PH BLDA: 7.36 PH UNITS (ref 7.35–7.45)
PH, TEMP CORRECTED: ABNORMAL
PLATELET # BLD AUTO: 199 10*3/MM3 (ref 140–450)
PMV BLD AUTO: 11.7 FL (ref 6–12)
PO2 BLDA: 306 MM HG (ref 75–100)
PO2 TEMP ADJ BLD: ABNORMAL MM[HG]
POTASSIUM SERPL-SCNC: 3.9 MMOL/L (ref 3.5–5.2)
PROCALCITONIN SERPL-MCNC: 0.12 NG/ML (ref 0–0.25)
PROT SERPL-MCNC: 5.4 G/DL (ref 6–8.5)
RBC # BLD AUTO: 4.13 10*6/MM3 (ref 3.77–5.28)
SAO2 % BLDCOA: 98.6 % (ref 94–100)
SODIUM SERPL-SCNC: 145 MMOL/L (ref 136–145)
VENTILATOR MODE: ABNORMAL
WBC NRBC COR # BLD: 10.86 10*3/MM3 (ref 3.4–10.8)

## 2023-08-15 PROCEDURE — 99024 POSTOP FOLLOW-UP VISIT: CPT | Performed by: STUDENT IN AN ORGANIZED HEALTH CARE EDUCATION/TRAINING PROGRAM

## 2023-08-15 PROCEDURE — 94799 UNLISTED PULMONARY SVC/PX: CPT

## 2023-08-15 PROCEDURE — 25010000002 DIPHENHYDRAMINE PER 50 MG: Performed by: INTERNAL MEDICINE

## 2023-08-15 PROCEDURE — 36600 WITHDRAWAL OF ARTERIAL BLOOD: CPT

## 2023-08-15 PROCEDURE — 25010000002 FUROSEMIDE PER 20 MG: Performed by: INTERNAL MEDICINE

## 2023-08-15 PROCEDURE — 84145 PROCALCITONIN (PCT): CPT | Performed by: INTERNAL MEDICINE

## 2023-08-15 PROCEDURE — 97535 SELF CARE MNGMENT TRAINING: CPT

## 2023-08-15 PROCEDURE — 82805 BLOOD GASES W/O2 SATURATION: CPT

## 2023-08-15 PROCEDURE — 25010000002 PIPERACILLIN SOD-TAZOBACTAM PER 1 G: Performed by: INTERNAL MEDICINE

## 2023-08-15 PROCEDURE — 85027 COMPLETE CBC AUTOMATED: CPT | Performed by: INTERNAL MEDICINE

## 2023-08-15 PROCEDURE — 25010000002 METHYLPREDNISOLONE PER 125 MG: Performed by: INTERNAL MEDICINE

## 2023-08-15 PROCEDURE — 25010000002 HYDRALAZINE PER 20 MG: Performed by: INTERNAL MEDICINE

## 2023-08-15 PROCEDURE — 83880 ASSAY OF NATRIURETIC PEPTIDE: CPT | Performed by: INTERNAL MEDICINE

## 2023-08-15 PROCEDURE — 94660 CPAP INITIATION&MGMT: CPT

## 2023-08-15 PROCEDURE — 99233 SBSQ HOSP IP/OBS HIGH 50: CPT | Performed by: FAMILY MEDICINE

## 2023-08-15 PROCEDURE — 92610 EVALUATE SWALLOWING FUNCTION: CPT

## 2023-08-15 PROCEDURE — 25010000002 ENOXAPARIN PER 10 MG: Performed by: INTERNAL MEDICINE

## 2023-08-15 PROCEDURE — 25010000002 CHLOROTHIAZIDE PER 500 MG: Performed by: INTERNAL MEDICINE

## 2023-08-15 PROCEDURE — 25010000002 ONDANSETRON PER 1 MG: Performed by: INTERNAL MEDICINE

## 2023-08-15 PROCEDURE — 94761 N-INVAS EAR/PLS OXIMETRY MLT: CPT

## 2023-08-15 PROCEDURE — 25010000002 HYDROMORPHONE 1 MG/ML SOLUTION: Performed by: INTERNAL MEDICINE

## 2023-08-15 PROCEDURE — 25010000002 LORAZEPAM PER 2 MG: Performed by: INTERNAL MEDICINE

## 2023-08-15 PROCEDURE — 80053 COMPREHEN METABOLIC PANEL: CPT | Performed by: INTERNAL MEDICINE

## 2023-08-15 PROCEDURE — 82375 ASSAY CARBOXYHB QUANT: CPT

## 2023-08-15 PROCEDURE — 71045 X-RAY EXAM CHEST 1 VIEW: CPT

## 2023-08-15 PROCEDURE — 99291 CRITICAL CARE FIRST HOUR: CPT | Performed by: INTERNAL MEDICINE

## 2023-08-15 PROCEDURE — 83050 HGB METHEMOGLOBIN QUAN: CPT

## 2023-08-15 PROCEDURE — 94664 DEMO&/EVAL PT USE INHALER: CPT

## 2023-08-15 PROCEDURE — 97530 THERAPEUTIC ACTIVITIES: CPT

## 2023-08-15 RX ORDER — HYDROXYZINE HYDROCHLORIDE 25 MG/ML
25 INJECTION, SOLUTION INTRAMUSCULAR EVERY 6 HOURS PRN
Status: DISCONTINUED | OUTPATIENT
Start: 2023-08-15 | End: 2023-08-18 | Stop reason: HOSPADM

## 2023-08-15 RX ORDER — ATORVASTATIN CALCIUM 20 MG/1
20 TABLET, FILM COATED ORAL DAILY
Status: DISCONTINUED | OUTPATIENT
Start: 2023-08-15 | End: 2023-08-18 | Stop reason: HOSPADM

## 2023-08-15 RX ORDER — CETIRIZINE HYDROCHLORIDE 10 MG/1
10 TABLET ORAL DAILY
Status: DISCONTINUED | OUTPATIENT
Start: 2023-08-15 | End: 2023-08-18 | Stop reason: HOSPADM

## 2023-08-15 RX ORDER — DIPHENHYDRAMINE HYDROCHLORIDE 50 MG/ML
25 INJECTION INTRAMUSCULAR; INTRAVENOUS EVERY 6 HOURS
Status: DISCONTINUED | OUTPATIENT
Start: 2023-08-15 | End: 2023-08-16

## 2023-08-15 RX ORDER — FUROSEMIDE 10 MG/ML
40 INJECTION INTRAMUSCULAR; INTRAVENOUS ONCE
Status: COMPLETED | OUTPATIENT
Start: 2023-08-15 | End: 2023-08-15

## 2023-08-15 RX ADMIN — BUDESONIDE 1 MG: 0.5 INHALANT RESPIRATORY (INHALATION) at 06:54

## 2023-08-15 RX ADMIN — Medication 10 ML: at 08:39

## 2023-08-15 RX ADMIN — DILTIAZEM HYDROCHLORIDE 60 MG: 30 TABLET, FILM COATED ORAL at 11:52

## 2023-08-15 RX ADMIN — PIPERACILLIN SODIUM AND TAZOBACTAM SODIUM 3.38 G: 3; .375 INJECTION, SOLUTION INTRAVENOUS at 08:38

## 2023-08-15 RX ADMIN — QUETIAPINE FUMARATE 12.5 MG: 25 TABLET ORAL at 20:45

## 2023-08-15 RX ADMIN — TRAZODONE HYDROCHLORIDE 100 MG: 50 TABLET ORAL at 20:47

## 2023-08-15 RX ADMIN — CLONAZEPAM 0.5 MG: 0.5 TABLET ORAL at 08:39

## 2023-08-15 RX ADMIN — DILTIAZEM HYDROCHLORIDE 60 MG: 30 TABLET, FILM COATED ORAL at 05:22

## 2023-08-15 RX ADMIN — NYSTATIN 500000 UNITS: 100000 SUSPENSION ORAL at 08:39

## 2023-08-15 RX ADMIN — Medication 10 ML: at 08:40

## 2023-08-15 RX ADMIN — ARFORMOTEROL TARTRATE 15 MCG: 15 SOLUTION RESPIRATORY (INHALATION) at 06:54

## 2023-08-15 RX ADMIN — ENOXAPARIN SODIUM 70 MG: 100 INJECTION SUBCUTANEOUS at 08:38

## 2023-08-15 RX ADMIN — HYDROMORPHONE HYDROCHLORIDE 1 MG: 1 INJECTION, SOLUTION INTRAMUSCULAR; INTRAVENOUS; SUBCUTANEOUS at 05:22

## 2023-08-15 RX ADMIN — IPRATROPIUM BROMIDE AND ALBUTEROL SULFATE 3 ML: 2.5; .5 SOLUTION RESPIRATORY (INHALATION) at 13:33

## 2023-08-15 RX ADMIN — FLUTICASONE PROPIONATE 2 SPRAY: 50 SPRAY, METERED NASAL at 20:48

## 2023-08-15 RX ADMIN — IPRATROPIUM BROMIDE AND ALBUTEROL SULFATE 3 ML: 2.5; .5 SOLUTION RESPIRATORY (INHALATION) at 06:54

## 2023-08-15 RX ADMIN — NYSTATIN 500000 UNITS: 100000 SUSPENSION ORAL at 20:47

## 2023-08-15 RX ADMIN — DIPHENHYDRAMINE HYDROCHLORIDE 25 MG: 50 INJECTION INTRAMUSCULAR; INTRAVENOUS at 22:43

## 2023-08-15 RX ADMIN — NYSTATIN 500000 UNITS: 100000 SUSPENSION ORAL at 11:52

## 2023-08-15 RX ADMIN — TIOTROPIUM BROMIDE INHALATION SPRAY 2 PUFF: 3.12 SPRAY, METERED RESPIRATORY (INHALATION) at 08:00

## 2023-08-15 RX ADMIN — PIPERACILLIN SODIUM AND TAZOBACTAM SODIUM 3.38 G: 3; .375 INJECTION, SOLUTION INTRAVENOUS at 17:57

## 2023-08-15 RX ADMIN — DIPHENHYDRAMINE HYDROCHLORIDE 50 MG: 50 INJECTION INTRAMUSCULAR; INTRAVENOUS at 11:52

## 2023-08-15 RX ADMIN — AMIODARONE HYDROCHLORIDE 200 MG: 200 TABLET ORAL at 08:39

## 2023-08-15 RX ADMIN — CHLOROTHIAZIDE SODIUM 500 MG: 500 INJECTION, POWDER, LYOPHILIZED, FOR SOLUTION INTRAVENOUS at 08:38

## 2023-08-15 RX ADMIN — ONDANSETRON 4 MG: 2 INJECTION INTRAMUSCULAR; INTRAVENOUS at 22:45

## 2023-08-15 RX ADMIN — DULOXETINE HYDROCHLORIDE 60 MG: 30 CAPSULE, DELAYED RELEASE ORAL at 08:38

## 2023-08-15 RX ADMIN — ENOXAPARIN SODIUM 70 MG: 100 INJECTION SUBCUTANEOUS at 20:48

## 2023-08-15 RX ADMIN — DIPHENHYDRAMINE HYDROCHLORIDE 25 MG: 50 INJECTION INTRAMUSCULAR; INTRAVENOUS at 17:57

## 2023-08-15 RX ADMIN — LORAZEPAM 1 MG: 2 INJECTION INTRAMUSCULAR; INTRAVENOUS at 10:15

## 2023-08-15 RX ADMIN — Medication 10 ML: at 21:09

## 2023-08-15 RX ADMIN — DILTIAZEM HYDROCHLORIDE 60 MG: 30 TABLET, FILM COATED ORAL at 17:57

## 2023-08-15 RX ADMIN — DIPHENHYDRAMINE HYDROCHLORIDE 50 MG: 50 INJECTION INTRAMUSCULAR; INTRAVENOUS at 04:34

## 2023-08-15 RX ADMIN — LORAZEPAM 1 MG: 2 INJECTION INTRAMUSCULAR; INTRAVENOUS at 01:29

## 2023-08-15 RX ADMIN — ATORVASTATIN CALCIUM 20 MG: 20 TABLET, FILM COATED ORAL at 15:04

## 2023-08-15 RX ADMIN — HYDRALAZINE HYDROCHLORIDE 10 MG: 20 INJECTION, SOLUTION INTRAMUSCULAR; INTRAVENOUS at 05:22

## 2023-08-15 RX ADMIN — METOPROLOL TARTRATE 12.5 MG: 25 TABLET, FILM COATED ORAL at 20:48

## 2023-08-15 RX ADMIN — METOPROLOL TARTRATE 12.5 MG: 25 TABLET, FILM COATED ORAL at 08:39

## 2023-08-15 RX ADMIN — FLUTICASONE PROPIONATE 2 SPRAY: 50 SPRAY, METERED NASAL at 08:41

## 2023-08-15 RX ADMIN — SERTRALINE 100 MG: 50 TABLET, FILM COATED ORAL at 20:48

## 2023-08-15 RX ADMIN — METHYLPREDNISOLONE SODIUM SUCCINATE 60 MG: 125 INJECTION, POWDER, LYOPHILIZED, FOR SOLUTION INTRAMUSCULAR; INTRAVENOUS at 08:39

## 2023-08-15 RX ADMIN — FUROSEMIDE 40 MG: 10 INJECTION, SOLUTION INTRAMUSCULAR; INTRAVENOUS at 15:04

## 2023-08-15 RX ADMIN — CETIRIZINE HYDROCHLORIDE 10 MG: 10 TABLET, FILM COATED ORAL at 15:05

## 2023-08-15 RX ADMIN — NYSTATIN 500000 UNITS: 100000 SUSPENSION ORAL at 17:57

## 2023-08-15 NOTE — PROGRESS NOTES
LOS: 6 days   Patient Care Team:  Paul Quinteros MD as PCP - General (Internal Medicine)  Sima Moses MD as Consulting Physician (General Surgery)  Taiwo Curry MD as Consulting Physician (Cardiology)  Soledad Stauffer, RN as Ambulatory  ([facility])       Chief Complaint: POD6 exploratory laparotomy, adhesiolysis, reduction of small bowel volvulus    HPI: Patient seen and examined in ICU. More alert today and off BiPAP. Answering questions appropriately. Denies abdominal pain. She has been out of bed and in a chair. She is using a bedside commode and has had multiple bowel movements. NG clamped since yesterday evening. Swallow evaluation is pending.    Vital Signs  Temp:  [97 øF (36.1 øC)-98.4 øF (36.9 øC)] 97.2 øF (36.2 øC)  Heart Rate:  [62-86] 86  Resp:  [17-24] 17  BP: (159-209)/(71-97) 182/97    Physical Exam:     General Appearance:  Alert and cooperative, not in any acute distress.   Respiratory/Lungs:   Breath sounds heard bilaterally equally.  No crackles or wheezing. No Pleural rub or bronchial breathing. Normal respiratory effort.    Cardiovascular/Heart:  Normal S1 and S2, no murmur. No edema   GI/Abdomen:   Soft, non-tender, non-distended, midline incision is clean, dry, and intact. No evidence of infection. Diffuse abdominal ecchymosis.               Musculoskeletal/ Extremities:   Moves all extremities well   Skin: Diffuse ecchymosis consistent with recent falls at home and DVT ppx   Psychiatric : Alert and oriented x3.  No depression or anxiety    Neurologic: Cranial nerves 2 - 12 grossly intact, sensation intact, Motor power is normal and equal bilaterally.     Results Review:       Lab Results (last 24 hours)       Procedure Component Value Units Date/Time    Blood Gas, Arterial With Co-Ox [[id number]]  (Abnormal) Collected: 08/15/23 0715    Specimen: Arterial Blood Updated: 08/15/23 0715     Site Left Radial     Glenn's Test N/A     pH, Arterial 7.364 pH units       pCO2, Arterial 55.3 mm Hg      Comment: 83 Value above reference range        pO2, Arterial 306.0 mm Hg      Comment: 83 Value above reference range        HCO3, Arterial 31.5 mmol/L      Comment: 83 Value above reference range        Base Excess, Arterial 5.0 mmol/L      Comment: 83 Value above reference range        O2 Saturation, Arterial 98.6 %      Hematocrit, Blood Gas 30.9 %      Comment: 84 Value below reference range        Oxyhemoglobin 97.7 %      Methemoglobin 0.60 %      Carboxyhemoglobin 0.3 %      A-a DO2 --     Comment: UNABLE TO CALCULATE        Barometric Pressure for Blood Gas 731 mmHg      Modality Nasal Cannula     Flow Rate 3.0 lpm      Ventilator Mode NA     Note --     Collected by CB     Comment: Meter: [id number]     :  [id number]        pH, Temp Corrected --     pCO2, Temperature Corrected --     pO2, Temperature Corrected --    Comprehensive Metabolic Panel [[id number]]  (Abnormal) Collected: 08/15/23 0422    Specimen: Blood Updated: 08/15/23 0537     Glucose 81 mg/dL      BUN 20 mg/dL      Creatinine 0.66 mg/dL      Sodium 145 mmol/L      Potassium 3.9 mmol/L      Chloride 107 mmol/L      CO2 28.7 mmol/L      Calcium 7.7 mg/dL      Total Protein 5.4 g/dL      Albumin 3.4 g/dL      ALT (SGPT) 22 U/L      AST (SGOT) 22 U/L      Alkaline Phosphatase 48 U/L      Total Bilirubin 0.6 mg/dL      Globulin 2.0 gm/dL      A/G Ratio 1.7 g/dL      BUN/Creatinine Ratio 30.3     Anion Gap 9.3 mmol/L      eGFR 91.0 mL/min/1.73     Narrative:      GFR Normal >60  Chronic Kidney Disease <60  Kidney Failure <15    The GFR formula is only valid for adults with stable renal function between ages 18 and 70.    CBC (No Diff) [[id number]]  (Abnormal) Collected: 08/15/23 0422    Specimen: Blood Updated: 08/15/23 0529     WBC 10.86 10*3/mm3      RBC 4.13 10*6/mm3      Hemoglobin 9.8 g/dL      Hematocrit 34.0 %      MCV 82.3 fL      MCH 23.7 pg      MCHC 28.8 g/dL      RDW 21.4 %      RDW-SD 63.7 fl       MPV 11.7 fL      Platelets 199 10*3/mm3     Potassium [[id number]]  (Normal) Collected: 08/14/23 1501    Specimen: Blood Updated: 08/14/23 1527     Potassium 3.6 mmol/L                 Assessment & Plan       Partial small bowel obstruction    Patient is POD6 exploratory laparotomy, adhesiolysis, reduction of small bowel volvulus. She is doing well post operatively from an abdominal standpoint. She has had good return of bowel function. Discontinued her NG. Swallow evaluation pending. If okay, she can start clear liquid diet. Continue to get patient OOB, hopeful for her to start working with PT/OT. Small increase in WBC count today noted. Patient no longer receiving amio gtt. Discussed with nursing to obtain peripheral access and D/C central line.     Bianca Isaac DO  08/15/23  11:13 EDT

## 2023-08-15 NOTE — PROGRESS NOTES
"  CC: Acute Respiratory Failure.  Acute encephalopathy    S: Currently out of bed in a chair.  Off NIV.  Appears awake and alert.  Was able to answer questions appropriately.    ROS: Is complaining of some weakness.  Also complains of some thirst.  Denies any shortness of breath but does admit to mild occasional cough.    O:Vital signs reviewed.   BP (!) 182/97   Pulse 86   Temp 97.4 øF (36.3 øC) (Temporal)   Resp 17   Ht 165.1 cm (65\")   Wt 76.1 kg (167 lb 12.3 oz)   SpO2 100%   BMI 27.92 kg/mý     Temp (24hrs), Av.5 øF (36.4 øC), Min:97 øF (36.1 øC), Max:98.4 øF (36.9 øC)      I & Os reviewed.   Intake/Output         23 0700 - 08/15/23 0659    Intake (ml) 655.3    Output (ml) 2455    Net (ml) -1799.7            Net IO Since Admission: 8,544.12 mL [08/15/23 0944]      CVP Line. Day # 8   NG/OG tube present.     General/Constitutional: Appears to be in no obvious distress.  Eyes: PERRL. EOM sluggish.   Neck: Supple without JVD. No obvious masses noted.   Cardiovascular: S1 + S2.  Regular.  Lungs/Respiratory: Decreased bilateral air entry noted.  Somewhat diffuse, wheezing heard.  Bibasilar crackles noted.  GI/Abdomen: Postsurgical.  Soft. Bowel sounds hypoactive.  No obvious organomegaly noted.  Musculoskeletal/Extremities: Trace edema noted. Gait could not be assessed at this time, as the patient was laying in bed.   Neurologic: Was able to follow commands.   Psych: AAOx3.  Appears to be somewhat anxious.  Skin: Appeared somewhat dry       Labs: Reviewed.   Results from last 7 days   Lab Units 08/15/23  0422 23  0427 23  0352 23  1541 23  0412 23  0359   WBC 10*3/mm3 10.86* 8.02 5.17  --  3.96 8.94   HEMOGLOBIN g/dL 9.8* 9.3* 9.6* 9.2* 7.3* 9.3*   HEMATOCRIT % 34.0 32.3* 32.5* 32.3* 27.7* 34.9   PLATELETS 10*3/mm3 199 172 133*  --  115* 174       Lab Results   Component Value Date    PROCALCITO 0.11 2023    PROCALCITO 0.05 2023    PROCALCITO 0.07 2023 "       No results found for: CRP    No results found for: SEDRATE    Lab Results   Component Value Date    PROBNP 1,694.0 07/30/2023    PROBNP 1,543.0 07/20/2023    PROBNP 1,664.0 05/22/2023       Results from last 7 days   Lab Units 08/15/23  0422 08/14/23  1501 08/14/23  0427 08/14/23  0216 08/13/23  1350 08/13/23  0352   SODIUM mmol/L 145  --  148*  --  149* 150*   POTASSIUM mmol/L 3.9 3.6 3.4*   < > 3.4* 3.3*   CHLORIDE mmol/L 107  --  107  --  110* 106   CO2 mmol/L 28.7  --  31.6*  --  28.7 30.7*   BUN mg/dL 20  --  30*  --  31* 31*   CREATININE mg/dL 0.66  --  0.59  --  0.58 0.72   CALCIUM mg/dL 7.7*  --  7.8*  --  7.6* 8.2*   ANION GAP mmol/L 9.3  --  9.4  --  10.3 13.3   BILIRUBIN mg/dL 0.6  --  0.4  --   --  0.4   ALK PHOS U/L 48  --  48  --   --  53   ALT (SGPT) U/L 22  --  22  --   --  21   AST (SGOT) U/L 22  --  21  --   --  19   GLUCOSE mg/dL 81  --  125*  --  150* 150*   TOTAL PROTEIN g/dL 5.4*  --  5.4*  --   --  5.3*   ALBUMIN g/dL 3.4*  --  3.5  --   --  3.2*    < > = values in this interval not displayed.       Results from last 7 days   Lab Units 08/13/23  0139 08/12/23  1541 08/12/23  0412   MAGNESIUM mg/dL  --   --  2.3   PHOSPHORUS mg/dL 4.7* 1.5* 0.3*             Lab Results   Component Value Date    CKTOTAL 126 11/16/2021    CKTOTAL 121 10/27/2020    CKTOTAL 77 11/18/2019       No components found for: HSTROPT    Lab Results   Component Value Date    TROPONINT 22 (H) 07/30/2023    TROPONINT 23 (H) 07/30/2023    TROPONINT 17 (H) 07/20/2023       No results found for: DDIMER    Brief Urine Lab Results  (Last result in the past 365 days)        Color   Clarity   Blood   Leuk Est   Nitrite   Protein   CREAT   Urine HCG        08/11/23 0847             105.2                 Lab Results   Component Value Date    TSH 0.633 05/06/2022    TSH 2.120 11/16/2021    TSH 2.910 10/27/2020       No results found for: FREET4      Micro: As of August 15, 2023   No results found for: RESPCX  No results found  for: BCIDPCR  Lab Results   Component Value Date    BLOODCX No growth at 5 days 05/22/2023    BLOODCX No growth at 5 days 05/22/2023    BLOODCX No growth at 5 days 11/21/2022     Lab Results   Component Value Date    URINECX No growth 06/05/2017     No results found for: MRSACX  Lab Results   Component Value Date    MRSAPCR No MRSA Detected 08/13/2023    MRSAPCR MRSA Detected (A) 05/23/2023     No results found for: URCX  No components found for: LOWRESPCF  No results found for: THROATCX  No results found for: CULTURES  No components found for: STREPBCX  No results found for: STREPPNEUAG  No results found for: LEGIONELLA  No results found for: MYCOPLASCX  No results found for: GCCX  No results found for: WOUNDCX  No results found for: BODYFLDCX    Lab Results   Component Value Date    FLU Negative 01/22/2018    FLU Negative 01/22/2018       Lab Results   Component Value Date    ADENOVIRUS Not Detected 03/27/2023     Lab Results   Component Value Date    IR958U Not Detected 01/23/2023     Lab Results   Component Value Date    CVHKU1 Not Detected 01/23/2023     Lab Results   Component Value Date    CVNL63 Not Detected 01/23/2023     Lab Results   Component Value Date    CVOC43 Not Detected 01/23/2023     Lab Results   Component Value Date    HUMETPNEVS Not Detected 01/23/2023     Lab Results   Component Value Date    HURVEV Not Detected 01/23/2023     Lab Results   Component Value Date    FLUBPCR Not Detected 07/20/2023     Lab Results   Component Value Date    PARAINFLUE Not Detected 01/23/2023     Lab Results   Component Value Date    PARAFLUV2 Not Detected 01/23/2023     Lab Results   Component Value Date    PARAFLUV3 Not Detected 01/23/2023     Lab Results   Component Value Date    PARAFLUV4 Not Detected 01/23/2023     Lab Results   Component Value Date    BPERTPCR Not Detected 01/23/2023     No results found for: [id number]  Lab Results   Component Value Date    CPNEUPCR Not Detected 01/23/2023     Lab Results    Component Value Date    MPNEUMO Not Detected 01/23/2023     Lab Results   Component Value Date    FLUAPCR Not Detected 07/20/2023     No results found for: FLUAH3  No results found for: FLUAH1  Lab Results   Component Value Date    RSV Not Detected 01/23/2023     Lab Results   Component Value Date    BPARAPCR Not Detected 01/23/2023       COVID 19:  Lab Results   Component Value Date    COVID19 Not Detected 07/20/2023           ABG: Reviewed  Recent Labs     08/13/23  0339 08/13/23  1030 08/15/23  0715   PHART 7.428 7.388 7.364   DKJ8OQO 45.0 49.9* 55.3*   PO2ART 107.0* 112.0* 306.0*   KUA2OQF 29.7* 30.1* 31.5*   BASEEXCESS 4.8* 4.2* 5.0*         Lab Results   Component Value Date    LACTATE 1.6 08/10/2023    LACTATE 0.8 08/09/2023    LACTATE 1.0 08/07/2023         Echo: Results for orders placed in visit on 11/02/22    Adult Transthoracic Echo Complete W/ Cont if Necessary Per Protocol    Interpretation Summary  1.  Normal left ventricular size and systolic function, LVEF 60-65%.  2.  Mild concentric LVH.  3.  Normal LV diastolic filling pattern.  4.  Normal right ventricular size and systolic function.  5.  Mildly increased left atrial volume index.  6.  No significant valvular abnormalities.      Results for orders placed during the hospital encounter of 06/15/20    Adult Transthoracic Echo Complete W/ Cont if Necessary Per Protocol    Interpretation Summary  1.  Normal left ventricular size with low normal LV systolic function, LVEF 50-55%.  2.  Mild concentric LVH.  3.  Normal LV diastolic filling pattern.  4.  Mild right ventricular dilation with normal RV systolic function.  5.  Normal left and right atrial size.  6.  Trace MR and TR.        Pharmacy to Dose enoxaparin (LOVENOX),   Pharmacy to Dose Zosyn,         CXRay: Latest imaging study was reviewed personally.   Imaging Results (Last 24 Hours)       Procedure Component Value Units Date/Time    XR Chest 1 View [[id number]] Collected: 08/15/23 0800      Updated: 08/15/23 0803    Narrative:      PROCEDURE: XR CHEST 1 VW-     HISTORY: Resp Failure.; K56.600-Partial intestinal obstruction,  unspecified as to cause; I48.91-Unspecified atrial fibrillation     COMPARISON: 2 days prior.     FINDINGS: The heart is mildly enlarged, stable. Patient is rotated to  the right. Right internal jugular catheter and NG tube are stable in  position.. Mild interstitial disease noted bilaterally, stable. There is  mild left basilar atelectasis or infiltrate similar to prior exam.. The  mediastinum is unremarkable. There is no pneumothorax.  There are no  acute osseous abnormalities.       Impression:      Stable chest..                 This report was signed and finalized on 8/15/2023 8:00 AM by Angeles Collins MD.                 Assessment & Recommendations/Plan:   1.  Acute hypercarbic respiratory Failure.  Today's ABG shows mild worsening respiratory acidosis.  We will suggest that patient use her NIV device regularly today and tonight.  Will ask case management to obtain actual prescription from the [facility], for the patient's home NIV device.  I also asked the nursing staff to asked the patient's daughter to bring her home mask which may allow the patient to use NIV device more regularly in the hospital.    2.  Acute encephalopathy/anxiety  Somewhat improved  Was likely multifactorial  There definitely appears to be a component of delirium, likely due to critical illness  There may be a component of steroid causing an element of psychosis as well  Has been off Precedex drip  There definitely appears to be a component of withdrawal from multiple SSRIs that she is on at home including Cymbalta, Seroquel and trazodone  It appears that surgical service has allowed for removal of NG tube and oral intake.  If and when possible, I would suggest resuming the patient's home medications including SSRIs.  We will decrease IV Benadryl to 25 mg every 6 hourly.  We will also order Vistaril  25 mg IM q. 8 hourly, as needed for anxiety.    3.  Severe COPD   Last FEV1 of 31% predicted  Continue nebulized treatments  Apparently is on Breztri at home.  Will continue Solu-Medrol 60 mg daily for now.  Once the patient can have oral intake, will consider switching to oral prednisone    4.  Chronic respiratory acidosis  Does appear to have some issues with noncompliance, with NIV device, at home.  May need to make some adjustments, after the settings are reviewed    5.  Anemia.    Hemoglobin has remained stable status post transfusion  Continue close monitoring    6.  Abnormal CT of the chest    Lab Results   Component Value Date    PROCALCITO 0.11 08/09/2023    PROCALCITO 0.05 07/20/2023    PROCALCITO 0.07 05/24/2023   Started on Zosyn on 8/13/2023, for possible pneumonia  We will suggest discontinuation of antibiotics within 3-5 days total, as clinical suspicion for pneumonia is very low.  The CT findings appear to suggest inflammatory changes rather than true consolidation/infiltrate.  We will repeat procalcitonin today  Due to significant COPD and history of smoking, may need to be followed outpatient with repeat CT in 8-12 weeks or so.  This will be discussed/decided close to discharge    7.  Heavy ex-smoker  Quit smoking a few years ago    8.  Hypernatremia  Mostly resolved  Continue close follow-up    9.  Atrial fibrillation  Cardiology has evaluated the patient  On Lovenox  On amiodarone    10.  Fluid status  Will add BNP to the blood in the lab.  Will administer Diuril 500 mg daily for 2 more days.  Started on 8/14/2023.  Net IO Since Admission: 8,544.12 mL [08/15/23 0944]  Lab Results   Component Value Date    PROBNP 1,694.0 07/30/2023    PROBNP 1,543.0 07/20/2023    PROBNP 1,664.0 05/22/2023     11.  GI/nutrition  Nutrition per Dietician.   GI prophylaxis per admitting attending.    12.  DVT prophylaxis  Per admitting attending.    Critical Care time spent in direct patient care: 37 minutes including  high complexity decision making to assess, manipulate, and support vital organ system failure in this individual who has impairment of one or more vital organ systems such that there is a high probability of imminent or life threatening deterioration in the patient's condition.  This time includes multiple reassessments throughout the day, if needed and as appropriate.  This time excludes other billable procedures. Time does include preparation of documents, review of old records, coordinating care with other providers and direct bedside care.    We have reviewed patient's current orders and changes, if any, have been suggested to primary care team. Plan was also discussed with nursing staff, as necessary.     This document was electronically signed by Herbert Poe MD on 08/15/23 at 09:44 EDT      Dictated utilizing Dragon dictation.

## 2023-08-15 NOTE — PLAN OF CARE
Goal Outcome Evaluation:              Outcome Evaluation: Bedside eval of swallow completed with pt. seated upright in bed for po trials. She was drowsy and confused but participated adequate with encouragement and intermittent prompting. Oral mech was remarkable for generalized weakness and no upper dentition. She was given trials of puree, nectar-thick, and thin liquids. Oral phase was adequate with trials presented without dysphagia. Her drowsiness, weakness and confusion at present, however, may affect safety with some solid foods. No overt s/s aspiration with trials presented, but pt. had decreased laryngeal elevation per palpation which may be due to overall weakness and may affect safety with liquids at times. Recommend: 1. initiate pureed diet with nectar-thick liq with meals; H20 between meals only and not with any other po, 2. meds crushed in pudding/applesaue, 3. aspiration precautions, 4. reflux precautions. SLP to f/u tomorrow to reassess for potential in diet upgrade to include some solids if pt. shows sufficient improvement for safety. D/W RN following eval.

## 2023-08-15 NOTE — PLAN OF CARE
"Goal Outcome Evaluation:  Plan of Care Reviewed With: patient           Outcome Evaluation: Patient had multiple BM's throughout shift, MD notified. Patient c/o pain periodically but unable to provide number or location other than \"all over\". VSS. Bipap on for most of shift. Removed ~ 0530 per patient request. 3LNC currently. Otherwise no complaints.         "

## 2023-08-15 NOTE — THERAPY EVALUATION
Acute Care - Speech Language Pathology   Swallow Initial Evaluation  Ted     Patient Name: Gabi Dudely  : 1945  MRN: [id number]  Today's Date: 8/15/2023               Admit Date: 2023    Visit Dx:     ICD-10-CM ICD-9-CM   1. Partial small bowel obstruction  K56.600 560.9   2. Atrial fibrillation with rapid ventricular response  I48.91 427.31     Patient Active Problem List   Diagnosis    Adrenal adenoma    Anxiety    Chronic fatigue    Fibromyalgia    Hyperlipidemia    Essential hypertension    Insomnia    Osteoarthritis of knee    Osteoporosis    Pulmonary emphysema    Simple renal cyst    Tension headache    Vitamin D deficiency    Diverticulosis    Inversion of nipple    Paroxysmal atrial fibrillation    Coronary artery disease involving native coronary artery of native heart without angina pectoris    Autonomic dysfunction    Gastroesophageal reflux disease without esophagitis    Urge incontinence    Erythrasma    Compression fracture of T5 vertebra    Lung nodule    COPD exacerbation    Overweight (BMI 25.0-29.9)    Acute on chronic respiratory failure with hypoxia    Acute on chronic respiratory failure with hypoxia and hypercapnia    Iron deficiency anemia    Impaired mobility and ADLs    Acute respiratory failure with hypoxia and hypercapnia    Aspiration pneumonia of right lower lobe    Partial small bowel obstruction     Past Medical History:   Diagnosis Date    Adrenal adenoma     Anemia     Arrhythmia     Asthma     Atrial fibrillation     Back pain     Benign colonic polyp     Benign tumor of adrenal gland     Cataract     bilateral    Cholelithiasis     Chronic bronchitis     Chronic bronchitis with COPD (chronic obstructive pulmonary disease)     COPD (chronic obstructive pulmonary disease)     Coronary artery disease     Depression     Diverticulosis Years ago    Elevated cholesterol     Environmental and seasonal allergies     Fibromyalgia     Fibromyalgia, primary Sometime  in the 90s    Gastritis     Generalized anxiety disorder     GERD (gastroesophageal reflux disease)     H/O mammogram     Headache Years ago    Hemorrhoids     History of blood transfusion 1985    History of blood transfusion 1985    History of echocardiogram     History of endometriosis     History of nuclear stress test     Hospitalization or health care facility admission within last 6 months     5 times between 11/2022-05/2023    Hypertension     IBS (irritable bowel syndrome)     Impaired functional mobility, balance, gait, and endurance     Impaired mobility     Inverted nipple     Kidney disease     Kidney stone     Liver cyst     Low back pain Years ago    Nodular radiologic density     Nodule of left lung     On home oxygen therapy     2 liters NC QHS    Osteoarthritis     Osteopenia Several years ago    Osteoporosis     PONV (postoperative nausea and vomiting)     Renal cyst     Sinus problem     2014    Sinusitis     Skin cancer     basal cell carcinoma    SOB (shortness of breath)     Tobacco use     Urinary frequency     Urinary tract infection Years ago    Frequent UTIs    Vitamin D deficiency     Wears glasses     Wears partial dentures     upper plate     Past Surgical History:   Procedure Laterality Date    APPENDECTOMY  1980s    CARDIAC CATHETERIZATION  2003    CATARACT EXTRACTION  2013    both eyes    CHOLECYSTECTOMY  2020    CHOLECYSTECTOMY WITH INTRAOPERATIVE CHOLANGIOGRAM N/A 10/30/2020    Procedure: CHOLECYSTECTOMY LAPAROSCOPIC INTRAOPERATIVE CHOLANGIOGRAPHY;  Surgeon: Sima Moses MD;  Location: [facility] OR;  Service: General;  Laterality: N/A;    COLON SURGERY  2020    COLONOSCOPY  03/11/2013    COLONOSCOPY  06/21/2016    COLONOSCOPY N/A 07/24/2019    Procedure: COLONOSCOPY W/ COLD FORCEP POLYPECTOMIES; HOT SNARE POLYPECTOMIES; COLD SNARE POLYPECTOMY;  Surgeon: Goyo Nunez MD;  Location: [facility] ENDOSCOPY;  Service: Gastroenterology    COLONOSCOPY W/ BIOPSIES AND POLYPECTOMY       EXPLORATORY LAPAROTOMY N/A 11/23/2020    Procedure: colectomy, right, closure of enterotomy x 2, reduction of internal volvulus;  Surgeon: Sima Moess MD;  Location: [facility] OR;  Service: General;  Laterality: N/A;    EXPLORATORY LAPAROTOMY N/A 8/9/2023    Procedure: LAPAROTOMY EXPLORATORY WITH LYIS OF ADHESIONS AND  CENTRAL LINE INSERTION;  Surgeon: Bianca Isaac DO;  Location: [facility] OR;  Service: General;  Laterality: N/A;    HYSTERECTOMY  1980s    partial    LYSIS OF ABDOMINAL ADHESIONS      TONSILLECTOMY  1987    UPPER GASTROINTESTINAL ENDOSCOPY  01/13/2015    VAGINAL DELIVERY      x2       SLP Recommendation and Plan  SLP Swallowing Diagnosis: functional oral phase, suspected pharyngeal dysphagia, esophageal dysphagia (08/15/23 1232)  SLP Diet Recommendation: puree, nectar thick liquids, water between meals after oral care, with supervision (08/15/23 1232)  Recommended Precautions and Strategies: upright posture during/after eating, general aspiration precautions, reflux precautions (08/15/23 1232)  SLP Rec. for Method of Medication Administration: meds whole, meds crushed, with puree, as tolerated (08/15/23 1232)     Monitor for Signs of Aspiration: yes, notify SLP if any concerns, cough, gurgly voice, throat clearing, right lower lobe infiltrates, pneumonia (08/15/23 1232)  Recommended Diagnostics: reassess via clinical swallow evaluation (08/15/23 1232)     Anticipated Discharge Disposition (SLP): unknown (08/15/23 1232)     Therapy Frequency (Swallow): PRN (08/15/23 1232)     Oral Care Recommendations: Denture Care, Swab, Toothbrush (08/15/23 1232)                                      Oral Care Recommendations: Denture Care, Swab, Toothbrush (08/15/23 1232)    Outcome Evaluation: Bedside eval of swallow completed with pt. seated upright in bed for po trials. She was drowsy and confused but participated adequate with encouragement and intermittent prompting. Oral mech was remarkable for generalized  weakness and no upper dentition. She was given trials of puree, nectar-thick, and thin liquids. Oral phase was adequate with trials presented without dysphagia. Her drowsiness, weakness and confusion at present, however, may affect safety with some solid foods. No overt s/s aspiration with trials presented, but pt. had decreased laryngeal elevation per palpation which may be due to overall weakness and may affect safety with liquids at times. Recommend: 1. initiate pureed diet with nectar-thick liq with meals; H20 between meals only and not with any other po, 2. meds crushed in pudding/applesaue, 3. aspiration precautions, 4. reflux precautions. SLP to f/u tomorrow to reassess for potential in diet upgrade to include some solids if pt. shows sufficient improvement for safety. D/W RN following eval.      SWALLOW EVALUATION (last 72 hours)       SLP Adult Swallow Evaluation       Row Name 08/15/23 1232                   Rehab Evaluation    Document Type evaluation  -TM        Subjective Information no complaints  -TM        Patient Observations cooperative  drowsy and confused  -TM        Patient/Family/Caregiver Comments/Observations no family present  -TM        Patient Effort adequate  -TM           General Information    Patient Profile Reviewed yes  -TM        Pertinent History Of Current Problem bowel obstruction, GERD, pneu hx  -TM        Current Method of Nutrition NPO  -TM        Precautions/Limitations, Vision WFL with corrective lenses  -TM        Precautions/Limitations, Hearing WFL;for purposes of eval  -TM        Prior Level of Function-Communication unknown  -TM        Prior Level of Function-Swallowing unknown  -TM        Plans/Goals Discussed with patient;other (see comments)  RN  -TM        Barriers to Rehab medically complex  -TM        Patient's Goals for Discharge patient did not state  -TM           Pain    Additional Documentation Pain Scale: Word Pre/Post-Treatment (Group)  -TM           Pain  "Scale: Numbers Pre/Post-Treatment    Pain Intervention(s) Repositioned  -TM           Pain Scale: FACES Pre/Post-Treatment    Pain: FACES Scale, Pretreatment 4-->hurts little more  -TM        Posttreatment Pain Rating 4-->hurts little more  -TM        Pain Location - other (see comments)  \"all over\"  -TM           Oral Motor Structure and Function    Oral Lesions or Structural Abnormalities and/or variants none identified  -TM        Dentition Assessment other (see comments);natural, present and adequate  no upper dentition; lower natural teeth only  -TM        Secretion Management WNL/WFL  -TM        Volitional Cough WFL  -TM           Oral Musculature and Cranial Nerve Assessment    Oral Motor General Assessment generalized oral motor weakness  -TM           General Eating/Swallowing Observations    Respiratory Support Currently in Use nasal cannula  -TM        O2 Liters 2L  -TM        Eating/Swallowing Skills fed by SLP  -TM        Positioning During Eating upright in bed  -TM        Utensils Used spoon;straw  -TM        Consistencies Trialed pureed;nectar/syrup-thick liquids;thin liquids  -TM        Pre SpO2 (%) 99  -TM        Post SpO2 (%) 99  -TM           Respiratory    Respiratory Status WFL;during swallowing/eating  -TM           Clinical Swallow Eval    Oral Prep Phase WFL  with trials presented  -TM        Oral Transit WFL  -TM        Oral Residue WFL  -TM        Pharyngeal Phase suspected pharyngeal impairment  -TM        Esophageal Phase suspected esophageal impairment  dx of GERD  -TM        Clinical Swallow Evaluation Summary Bedside eval of swallow completed with pt. seated upright in bed for po trials.  She was drowsy and confused but participated adequate with encouragement and intermittent prompting.  Oral mech was remarkable for generalized weakness and no upper dentition.  She was given trials of puree, nectar-thick, and thin liquids.  Oral phase was adequate with trials presented without " dysphagia.  Her drowsiness, weakness and confusion at present, however, may affect safety with some solid foods.  No overt s/s aspiration with trials presented, but pt. had decreased laryngeal elevation per palpation which may be due to overall weakness and may affect safety with liquids at times.  Recommend:  1. initiate pureed diet with nectar-thick liq with meals;  H20 between meals only and not with any other po, 2. meds crushed in pudding/applesaue, 3. aspiration precautions, 4. reflux precautions.  SLP to f/u tomorrow to reassess for potential in diet upgrade to  include some solids if pt. shows sufficient improvement for safety.  D/W RN following eval.  -TM           Pharyngeal Phase Concerns    Pharyngeal Phase Concerns other (see comments)  decreased laryngeal elevation per palpation  -TM           Esophageal Phase Concerns    Esophageal Phase Concerns other (see comments)  dx of GERD  -TM           SLP Evaluation Clinical Impression    SLP Swallowing Diagnosis functional oral phase;suspected pharyngeal dysphagia;esophageal dysphagia  -TM        Functional Impact risk of aspiration/pneumonia  -           SLP Treatment Clinical Impressions    Barriers to Overall Progress (SLP) Medically complex  -TM           Recommendations    Therapy Frequency (Swallow) PRN  -TM        SLP Diet Recommendation puree;nectar thick liquids;water between meals after oral care, with supervision  -        Recommended Diagnostics reassess via clinical swallow evaluation  -        Recommended Precautions and Strategies upright posture during/after eating;general aspiration precautions;reflux precautions  -        Oral Care Recommendations Denture Care;Swab;Toothbrush  -        SLP Rec. for Method of Medication Administration meds whole;meds crushed;with puree;as tolerated  -        Monitor for Signs of Aspiration yes;notify SLP if any concerns;cough;gurgly voice;throat clearing;right lower lobe infiltrates;pneumonia   -        Anticipated Discharge Disposition (SLP) unknown  -                  User Key  (r) = Recorded By, (t) = Taken By, (c) = Cosigned By      Initials Name Effective Dates    TM Ashlyn Mendiola 06/16/21 -                     EDUCATION  The patient has been educated in the following areas:   Dysphagia (Swallowing Impairment) Oral Care/Hydration Modified Diet Instruction.              Time Calculation:    Time Calculation- SLP       Row Name 08/15/23 1316             Time Calculation- SLP    SLP Start Time 1232  -TM      SLP Received On 08/15/23  -         Untimed Charges    SLP Eval/Re-eval  ST Eval Oral Pharyng Swallow - [id number]  -                User Key  (r) = Recorded By, (t) = Taken By, (c) = Cosigned By      Initials Name Provider Type     Ashlyn Mendiola Speech and Language Pathologist                    Therapy Charges for Today       Code Description Service Date Service Provider Modifiers Qty    [id number] HC ST EVAL ORAL PHARYNG SWALLOW 4 8/15/2023 Ashlyn Mendiola GN 1                 Ashlyn Mendiola  8/15/2023

## 2023-08-15 NOTE — PROGRESS NOTES
"    [facility]    Name:  Gabi Dudley   Age:  78 y.o.  Sex:  female  :  1945  MRN:  [id number]   Visit Number:  [id number]  Admission Date:  2023  Date Of Service:  08/15/23  Primary Care Physician:  Paul Quinteros MD     LOS: 6 days :    Chief Complaint:      Anxiety     Subjective:    Patient was seen and examined at bedside today.  Patient laying comfortably in bed with no distress.  Patient fully awake, alert and oriented x3.  Patient still has an NG tube this morning.  Patient reporting doing much better and has no abdominal pain she reports that her abdomen is only \" mildly touchy\".  She denies shortness of breath, chest pain or nausea and vomiting.  Patient feels that she can eat and feeling hungry.  Still has episodes of confusion per nursing at bedside.  Blood pressure slightly up today with BP of 182/97, as needed antihypertensives ordered.  Patient on 2 to 3 L through nasal cannula and was on BiPAP overnight.  Afebrile.  Patient had multiple bowel movements yesterday and another bowel movement today.    Hospital Course:    Patient is a chronically ill 78-year-old female history significant for hypertension, hyperlipidemia, depression/anxiety and CAD who presents to the emergency room with 3 to 4 days of progressively worsening abdominal pain.  Patient describes the pain as intermittent and sharp, diffuse across her abdomen.  Patient has chronic constipation so cannot recall her last bowel movement.  Admits to some mild nausea, denies vomiting.  Patient does have a history of small bowel obstruction which required colectomy .  States pain is similar but not as severe as previous episode.  Nothing makes it better or worse.  Patient does admit to still passing gas.    Upon arrival to the ER, patient afebrile hemodynamically stable and nonhypoxic.  CMP unremarkable except for glucose 142.  Lactate lipase normal.  WBC 13, hemoglobin hematocrit at " baseline, platelets 112.  UA negative for urinary tract infection.  CT abdomen pelvis revealed mid small bowel dilation worrisome for partial small bowel obstruction.  Dr. Isaac agreed to consult hospital service asked to admit.    Hospital course:  -8/14/2023 patient continues to have episodes of agitation please see Dr. Dewitt note from overnight.  Currently comfortable on the BiPAP.  Still with NG output about 500 overnight per Gio the night nurse.  Discussed with Dr. Poe and Dr. Isaac. D/w Farida her daughter. D/w Pharmacy resuming meds. Numerous serotonergic meds. Reducing zoloft dose. Has had bm the last two days.    -8/13/2023: Patient had an episode of respiratory distress today.  Was trying to come off the BiPAP.  Had some upper airway sounds concerning for stridor.  Was given medications to help BiPAP synchrony including Haldol Benadryl Ativan.  ABG reviewed.  Following this her heart rate normalized from the 160s to the 80s went from atrial fibrillation back to sinus rhythm.  Went down for a CT which ruled out tracheal obstruction.  Updated Dr. Poe on these changes.  Discussed with family.    -8/12/2023: Saw patient at the bedside more awake conversant today.  Family had concerns addressed. Dr. Simon saw. MRI reviewed with family at bedside. Patient had increased alertness but also too increased agitation for which I added medication in hopes of controlling this. Not hematuria in martinez bag but good urine output gave 1 u prbc.    -8/11/2023 patient with disorientation this morning.  Decreased pain medicine dose ordered CT of the head which was negative for acute process.    -8/10/2023: Patient converted to sinus rhythm this morning.  Dr. Curry's note reviewed.  Was taken to the OR for adhesiolysis by Dr. Isaac with assist from Dr. Bowden last evening.  Currently n.p.o. with NG tube in place.    -8/9/2023 patient with RVR overnight was started on Cardizem but that did not help.  Persistent pain  discussed with Dr. Isaac taking  to the OR.  Discussed with Dr. Curry who recommended amiodarone and discussed perioperative risk management with Dr. Isaac.  8/8/2023 Pain not well controlled increased morphine dose from 1 mg to 2 mg    Review of Systems:     All systems were reviewed and negative except as mentioned in subjective, assessment and plan.    Vital Signs:    Temp:  [97 øF (36.1 øC)-98.4 øF (36.9 øC)] 97.4 øF (36.3 øC)  Heart Rate:  [62-86] 86  Resp:  [17-24] 17  BP: (159-209)/(71-97) 182/97    Intake and output:    I/O last 3 completed shifts:  In: 872.8 [P.O.:10; I.V.:672.8; Other:190]  Out: 3080 [Urine:2980; Emesis/NG output:100]  No intake/output data recorded.    Physical Examination:    General Appearance:  Alert and cooperative.  Chronically ill-appearing.  No acute distress.   Head:  Atraumatic and normocephalic.   Eyes: Conjunctivae and sclerae normal, no icterus. No pallor.   Throat: No oral lesions, no thrush, oral mucosa moist.   Neck: Supple, trachea midline, no thyromegaly.   Lungs:   Breath sounds heard bilaterally equally.  Bilateral wheezing.  No crackles.  Prolonged expiration.  Decreased air movement bilaterally.    Heart:  Normal S1 and S2, no murmur, no gallop, no rub. No JVD.   Abdomen:   Normal bowel sounds, no masses, no organomegaly. Soft, nontender, nondistended, no rebound tenderness.  Midline incision appears clean with dry dressing, nontender to palpation.  No evidence of infection.   Extremities: Supple, no edema, no cyanosis, no clubbing.   Skin: No bleeding or rash.   Neurologic: Alert and oriented x 3. No facial asymmetry. Moves all four limbs. No tremors.      Laboratory results:    Results from last 7 days   Lab Units 08/15/23  0422 08/14/23  1501 08/14/23  0427 08/14/23  0216 08/13/23  1350 08/13/23  0352   SODIUM mmol/L 145  --  148*  --  149* 150*   POTASSIUM mmol/L 3.9 3.6 3.4*   < > 3.4* 3.3*   CHLORIDE mmol/L 107  --  107  --  110* 106   CO2 mmol/L 28.7  --   31.6*  --  28.7 30.7*   BUN mg/dL 20  --  30*  --  31* 31*   CREATININE mg/dL 0.66  --  0.59  --  0.58 0.72   CALCIUM mg/dL 7.7*  --  7.8*  --  7.6* 8.2*   BILIRUBIN mg/dL 0.6  --  0.4  --   --  0.4   ALK PHOS U/L 48  --  48  --   --  53   ALT (SGPT) U/L 22  --  22  --   --  21   AST (SGOT) U/L 22  --  21  --   --  19   GLUCOSE mg/dL 81  --  125*  --  150* 150*    < > = values in this interval not displayed.     Results from last 7 days   Lab Units 08/15/23  0422 08/14/23  0427 08/13/23  0352   WBC 10*3/mm3 10.86* 8.02 5.17   HEMOGLOBIN g/dL 9.8* 9.3* 9.6*   HEMATOCRIT % 34.0 32.3* 32.5*   PLATELETS 10*3/mm3 199 172 133*                 Recent Labs     08/13/23  0339 08/13/23  1030 08/15/23  0715   PHART 7.428 7.388 7.364   YJE8DGW 45.0 49.9* 55.3*   PO2ART 107.0* 112.0* 306.0*   PXU4SGB 29.7* 30.1* 31.5*   BASEEXCESS 4.8* 4.2* 5.0*      I have reviewed the patient's laboratory results.    Radiology results:    XR Neck Soft Tissue    Result Date: 8/13/2023  Neck soft tissue  HISTORY: Stridor  FINDINGS: 2 views of the neck were obtained. The base of the neck is obscured by the shoulders on the lateral view. A right jugular deep line is present. A nasogastric tube is also noted. The visualized airway appears normal. The epiglottis appears normal.      Impression: No focal airway abnormality identified.  This report was signed and finalized on 8/13/2023 12:08 PM by Maximo Epps MD.      CT Soft Tissue Neck Without Contrast    Result Date: 8/13/2023  PROCEDURE: CT SOFT TISSUE NECK WO CONTRAST-  HISTORY: Epiglottitis or tonsillitis suspected; K56.600-Partial intestinal obstruction, unspecified as to cause; I48.91-Unspecified atrial fibrillation  TECHNIQUE:  Thin section axial CT images were obtained through the neck without intravenous contrast administration. Sagittal and coronal reformatted images were also obtained. This study was performed with techniques to keep radiation doses as low as reasonably achievable,  (ALARA). Individualized dose reduction techniques using automated exposure control or adjustment of mA and/or kV according to the patient size were employed.  COMPARISON: None.  FINDINGS: A feeding tube and right jugular deep line are present. The nasopharynx and oropharynx have an unremarkable appearance. The epiglottis has an unremarkable appearance. The hypopharynx has an unremarkable appearance. There is some motion at the level of the larynx which otherwise appears normal. The thyroid gland is unremarkable. Moderate bilateral carotid artery calcifications are noted. Mild mucosal thickening is seen of the right sphenoid sinus. Moderate degenerative changes are noted of the cervical spine.       Impression: No CT evidence of epiglottitis or tonsillitis identified..       This report was signed and finalized on 8/13/2023 11:46 AM by Maximo Epps MD.      CT Chest Without Contrast Diagnostic    Result Date: 8/13/2023  PROCEDURE: CT CHEST WO CONTRAST DIAGNOSTIC-  HISTORY: Abnormal xray - lung opacity/opacities; K56.600-Partial intestinal obstruction, unspecified as to cause; I48.91-Unspecified atrial fibrillation  COMPARISON: July 20, 2023 CT  FINDINGS: Axial CT images of the chest were obtained without contrast. Coronal and sagittal reformatted images were also obtained. This study was performed with techniques to keep radiation doses as low as reasonably achievable, (ALARA). Individualized dose reduction techniques using automated exposure control or adjustment of mA and/or kV according to the patient size were employed.  A right jugular deep line and feeding tube are present. There is no evidence of mediastinal mass or adenopathy. No axillary mass or adenopathy is identified. Mild emphysema is noted. There are new small nodular opacities in the posterior left upper lobe which are likely inflammatory. Small bilateral pleural effusions are seen with mild bibasilar atelectasis. No chest wall abnormality is  identified. Limited images of the upper abdomen reveal postoperative changes from cholecystectomy. There are several small nonobstructing renal stones. A 5.5 cm cystic mass is seen at the lateral aspect of the right kidney with calcification in its wall. A mildly complicated cyst is favored. There is mild anasarca.       Impression: New small nodular opacities in the posterior left upper lobe which are likely inflammatory.  Small pleural effusions with mild bibasilar atelectasis.  Cystic lateral right renal mass, favor a mildly complicated cyst.    This report was signed and finalized on 8/13/2023 11:42 AM by Maximo Epps MD.      XR Chest 1 View    Result Date: 8/15/2023  PROCEDURE: XR CHEST 1 VW-  HISTORY: Resp Failure.; K56.600-Partial intestinal obstruction, unspecified as to cause; I48.91-Unspecified atrial fibrillation  COMPARISON: 2 days prior.  FINDINGS: The heart is mildly enlarged, stable. Patient is rotated to the right. Right internal jugular catheter and NG tube are stable in position.. Mild interstitial disease noted bilaterally, stable. There is mild left basilar atelectasis or infiltrate similar to prior exam.. The mediastinum is unremarkable. There is no pneumothorax.  There are no acute osseous abnormalities.      Impression: Stable chest..      This report was signed and finalized on 8/15/2023 8:00 AM by Angeles Collins MD.      XR Chest 1 View    Result Date: 8/13/2023  PROCEDURE: XR CHEST 1 VW-  HISTORY: sob; K56.600-Partial intestinal obstruction, unspecified as to cause; I48.91-Unspecified atrial fibrillation   COMPARISON: August 12, 2023.  FINDINGS: Cardiomegaly is noted. A nasogastric tube and right jugular deep line remain in place. There are persistent mild left lung base opacities, favor atelectasis. There is no pneumothorax. The bony thorax in intact.      Impression: Stable mild left lung base opacities, favor atelectasis.    This report was signed and finalized on 8/13/2023 11:48 AM by  Maximo Epps MD.     I have reviewed the patient's radiology reports.    Medication Review:     I have reviewed the patient's active and prn medications.     Problem List:      Partial small bowel obstruction      Assessment:    Partial small bowel obstruction due to volvulus.    Acute on Chronic respiratory failure s/p intubation  History of colectomy due to obstruction in 2018  Paroxysmal A-fib with RVR   Hypertension  Hyperlipidemia  Depression/anxiety  GERD  Delirium    Plan:    Partial small bowel obstruction due to volvulus.    -S/p exploratory laparotomy, adhesiolysis and reduction of small bowel volvulus by Dr. Isaac on 8/9/2023.  -Dr. Isaac with general surgery following, appreciate recommendations.  -Plan for removing NG tube and advancing diet per surgery.  Speech evaluation ordered.  -Continue pain control  -PT/OT consulted.  -Discontinue central line.    Acute on Chronic respiratory failure s/p intubation  HAP  Severe COPD  -Pulmonology following, appreciate their recommendations.  -Continue supplemental oxygen and NIV therapy, patient is currently on 2 to 3 L through nasal cannula.   -On IV Solu-Medrol 60 mg IV daily  -Bronchodilators  -Given Diuril  -Continuing IV Zosyn antibiotics for HAP.   - Due to significant COPD and history of smoking, need to be followed outpatient with repeat CT in 8-12 weeks or so.    Paroxysmal A-fib with RVR   -Cardiology consulted and evaluated the patient.  -On therapeutic Lovenox  -Continue po amiodarone, Lopressor and Cardizem  -Plan to switch her back to Eliquis once okay with surgery.    Hypertension  -Continue antihypertensives  -as needed hydralazine and labetalol.    Delirium, resolved  -Appears to be improved  -Neurology have evaluated the patient previously.   -Likely metabolic and due to being hospitalized and respiratory status.  -Continue Vistaril, Klonopin and Geodon as needed.    Anemia.    -Hemoglobin has remained stable status post  transfusion  -Continue close monitoring  -Hematuria resolved.  Suspect was traumatic due to agitation and pulling at lines.      -Further orders as indicated per clinical course.    DVT Prophylaxis: Therapeutic Lovenox  Code Status: Full  Diet: Advance to clear liquid diet per surgery recommendations  Discharge Plan: To be determined, likely 2 to 3 days in the hospital then to [facility].    Tom Keyes MD  08/15/23  09:27 EDT    Dictated utilizing Dragon dictation.

## 2023-08-15 NOTE — THERAPY TREATMENT NOTE
Patient Name: Gabi Dudley  : 1945    MRN: [id number]                              Today's Date: 8/15/2023       Admit Date: 2023    Visit Dx:     ICD-10-CM ICD-9-CM   1. Partial small bowel obstruction  K56.600 560.9   2. Atrial fibrillation with rapid ventricular response  I48.91 427.31     Patient Active Problem List   Diagnosis    Adrenal adenoma    Anxiety    Chronic fatigue    Fibromyalgia    Hyperlipidemia    Essential hypertension    Insomnia    Osteoarthritis of knee    Osteoporosis    Pulmonary emphysema    Simple renal cyst    Tension headache    Vitamin D deficiency    Diverticulosis    Inversion of nipple    Paroxysmal atrial fibrillation    Coronary artery disease involving native coronary artery of native heart without angina pectoris    Autonomic dysfunction    Gastroesophageal reflux disease without esophagitis    Urge incontinence    Erythrasma    Compression fracture of T5 vertebra    Lung nodule    COPD exacerbation    Overweight (BMI 25.0-29.9)    Acute on chronic respiratory failure with hypoxia    Acute on chronic respiratory failure with hypoxia and hypercapnia    Iron deficiency anemia    Impaired mobility and ADLs    Acute respiratory failure with hypoxia and hypercapnia    Aspiration pneumonia of right lower lobe    Partial small bowel obstruction     Past Medical History:   Diagnosis Date    Adrenal adenoma     Anemia     Arrhythmia     Asthma     Atrial fibrillation     Back pain     Benign colonic polyp     Benign tumor of adrenal gland     Cataract     bilateral    Cholelithiasis     Chronic bronchitis     Chronic bronchitis with COPD (chronic obstructive pulmonary disease)     COPD (chronic obstructive pulmonary disease)     Coronary artery disease     Depression     Diverticulosis Years ago    Elevated cholesterol     Environmental and seasonal allergies     Fibromyalgia     Fibromyalgia, primary Sometime in the s    Gastritis     Generalized anxiety disorder      GERD (gastroesophageal reflux disease)     H/O mammogram     Headache Years ago    Hemorrhoids     History of blood transfusion 1985    History of blood transfusion 1985    History of echocardiogram     History of endometriosis     History of nuclear stress test     Hospitalization or health care facility admission within last 6 months     5 times between 11/2022-05/2023    Hypertension     IBS (irritable bowel syndrome)     Impaired functional mobility, balance, gait, and endurance     Impaired mobility     Inverted nipple     Kidney disease     Kidney stone     Liver cyst     Low back pain Years ago    Nodular radiologic density     Nodule of left lung     On home oxygen therapy     2 liters NC QHS    Osteoarthritis     Osteopenia Several years ago    Osteoporosis     PONV (postoperative nausea and vomiting)     Renal cyst     Sinus problem     2014    Sinusitis     Skin cancer     basal cell carcinoma    SOB (shortness of breath)     Tobacco use     Urinary frequency     Urinary tract infection Years ago    Frequent UTIs    Vitamin D deficiency     Wears glasses     Wears partial dentures     upper plate     Past Surgical History:   Procedure Laterality Date    APPENDECTOMY  1980s    CARDIAC CATHETERIZATION  2003    CATARACT EXTRACTION  2013    both eyes    CHOLECYSTECTOMY  2020    CHOLECYSTECTOMY WITH INTRAOPERATIVE CHOLANGIOGRAM N/A 10/30/2020    Procedure: CHOLECYSTECTOMY LAPAROSCOPIC INTRAOPERATIVE CHOLANGIOGRAPHY;  Surgeon: Sima Moses MD;  Location: [facility];  Service: General;  Laterality: N/A;    COLON SURGERY  2020    COLONOSCOPY  03/11/2013    COLONOSCOPY  06/21/2016    COLONOSCOPY N/A 07/24/2019    Procedure: COLONOSCOPY W/ COLD FORCEP POLYPECTOMIES; HOT SNARE POLYPECTOMIES; COLD SNARE POLYPECTOMY;  Surgeon: Goyo Nunez MD;  Location: [facility];  Service: Gastroenterology    COLONOSCOPY W/ BIOPSIES AND POLYPECTOMY      EXPLORATORY LAPAROTOMY N/A 11/23/2020    Procedure: colectomy,  right, closure of enterotomy x 2, reduction of internal volvulus;  Surgeon: Sima Moses MD;  Location:  [facility];  Service: General;  Laterality: N/A;    EXPLORATORY LAPAROTOMY N/A 8/9/2023    Procedure: LAPAROTOMY EXPLORATORY WITH LYIS OF ADHESIONS AND  CENTRAL LINE INSERTION;  Surgeon: Bianca Isaac DO;  Location:  [facility];  Service: General;  Laterality: N/A;    HYSTERECTOMY  1980s    partial    LYSIS OF ABDOMINAL ADHESIONS      TONSILLECTOMY  1987    UPPER GASTROINTESTINAL ENDOSCOPY  01/13/2015    VAGINAL DELIVERY      x2      General Information       Row Name 08/15/23 1448          OT Time and Intention    Document Type therapy note (daily note)  -SP     Mode of Treatment occupational therapy  -SP       Row Name 08/15/23 1448          General Information    Patient Profile Reviewed yes  -SP     Existing Precautions/Restrictions fall;oxygen therapy device and L/min  -SP       Row Name 08/15/23 1448          Cognition    Orientation Status (Cognition) oriented to;person;place;situation  oriented to month, disoriented to year  -SP       Row Name 08/15/23 1448          Safety Issues, Functional Mobility    Impairments Affecting Function (Mobility) balance;endurance/activity tolerance;shortness of breath;strength;cognition  -SP     Cognitive Impairments, Mobility Safety/Performance insight into deficits/self-awareness;problem-solving/reasoning;awareness, need for assistance;impulsivity  -SP               User Key  (r) = Recorded By, (t) = Taken By, (c) = Cosigned By      Initials Name Provider Type    SP Zandra Cox OT Occupational Therapist                     Mobility/ADL's       Row Name 08/15/23 1449          Transfers    Transfers toilet transfer;sit-stand transfer  -SP       Row Name 08/15/23 1449          Sit-Stand Transfer    Sit-Stand [place] (Transfers) minimum assist (75% patient effort)  -SP     Assistive Device (Sit-Stand Transfers) other (see comments)  gait belt  -SP       Row Name  08/15/23 1449          Toilet Transfer    Type (Toilet Transfer) squat pivot  -SP     [place] Level (Toilet Transfer) moderate assist (50% patient effort);other (see comments)  -SP     Assistive Device (Toilet Transfer) other (see comments)  gait belt  -SP       Row Name 08/15/23 1449          Activities of Daily Living    BADL Assessment/Intervention toileting  -SP       Row Name 08/15/23 1449          Toileting Assessment/Training    [place] Level (Toileting) dependent (less than 25% patient effort)  -SP     Position (Toileting) supported standing  -SP               User Key  (r) = Recorded By, (t) = Taken By, (c) = Cosigned By      Initials Name Provider Type    Zandra Butt OT Occupational Therapist                   Obj/Interventions    No documentation.                  Goals/Plan    No documentation.                  Clinical Impression       Row Name 08/15/23 1450          Pain Assessment    Pretreatment Pain Rating 0/10 - no pain  -SP     Posttreatment Pain Rating 0/10 - no pain  -SP       Row Name 08/15/23 1450          Plan of Care Review    Plan of Care Reviewed With patient  -SP     Progress improving  -SP     Outcome Evaluation Pt participated in skilled OT interventions to address self care, transfers and endurance. Pt received sitting on the BSC on 3L O2 with sats >92%. Pt required min A to stand with gait belt and mod A to maintain static standing balance during perineal hygeine. Pt unable to participate in perineal hygeine secondary to poor endurance, balance and standing tolerance requiring total assist. Pt fatigued with one minute of standing and required seated rest break prior to standing again for perineal hygeine. Pt fatigued quickly and requested to sit back down. On third stand perineal hygeine completed and pt able to pivot to the chair with mod A. Pt positioned for comfort in the chair. She is progressing with skilled OT services and will continue to benfit from OT during  hospitilization to maximize independence.  -SP       Row Name 08/15/23 1450          Vital Signs    Pre SpO2 (%) --  3L  -SP     O2 Delivery Pre Treatment supplemental O2  -SP     Intra SpO2 (%) 96  3L  -SP     O2 Delivery Intra Treatment supplemental O2  -SP     Post SpO2 (%) 95  3L  -SP     O2 Delivery Post Treatment supplemental O2  -SP     Pre Patient Position Sitting  -SP     Intra Patient Position Standing  -SP     Post Patient Position Sitting  -SP       Row Name 08/15/23 1450          Positioning and Restraints    Pre-Treatment Position bedside commode  -SP     Post Treatment Position chair  -SP     In Chair reclined;call light within reach;encouraged to call for assist;exit alarm on;patient within staff view  -SP               User Key  (r) = Recorded By, (t) = Taken By, (c) = Cosigned By      Initials Name Provider Type    Zandra Butt OT Occupational Therapist                   Outcome Measures       Row Name 08/15/23 1458          How much help from another is currently needed...    Putting on and taking off regular lower body clothing? 2  -SP     Bathing (including washing, rinsing, and drying) 2  -SP     Toileting (which includes using toilet bed pan or urinal) 1  -SP     Putting on and taking off regular upper body clothing 2  -SP     Taking care of personal grooming (such as brushing teeth) 3  -SP     Eating meals 3  -SP     AM-PAC 6 Clicks Score (OT) 13  -SP       Row Name 08/15/23 1458          Functional Assessment    Outcome Measure Options AM-PAC 6 Clicks Daily Activity (OT)  -SP               User Key  (r) = Recorded By, (t) = Taken By, (c) = Cosigned By      Initials Name Provider Type    Zandra Butt OT Occupational Therapist                    Occupational Therapy Education       Title: PT OT SLP Therapies (In Progress)       Topic: Occupational Therapy (In Progress)       Point: ADL training (Done)       Description:   Instruct learner(s) on proper safety adaptation and remediation  techniques during self care or transfers.   Instruct in proper use of assistive devices.                  Learning Progress Summary             Patient Acceptance, E, DU by SP at 8/15/2023 145    Comment: Pt educated on value of OT services and continued participation in therapy. Pt agreeable and participated.    Acceptance, E, NR by AM at 8/13/2023 0452    Nonacceptance, E, NL,NR by SD at 8/10/2023 1412    Comment: OT POC                         Point: Home exercise program (Not Started)       Description:   Instruct learner(s) on appropriate technique for monitoring, assisting and/or progressing therapeutic exercises/activities.                  Learner Progress:  Not documented in this visit.              Point: Precautions (Not Started)       Description:   Instruct learner(s) on prescribed precautions during self-care and functional transfers.                  Learner Progress:  Not documented in this visit.              Point: Body mechanics (Not Started)       Description:   Instruct learner(s) on proper positioning and spine alignment during self-care, functional mobility activities and/or exercises.                  Learner Progress:  Not documented in this visit.                              User Key       Initials Effective Dates Name Provider Type Discipline    SD 06/16/21 -  Aimee Allan, OT Occupational Therapist OT    SP 09/08/22 -  Zandra Cox OT Occupational Therapist OT    AM 02/22/23 -  Gio Wang, RN Registered Nurse Nurse                  OT Recommendation and Plan     Plan of Care Review  Plan of Care Reviewed With: patient  Progress: improving  Outcome Evaluation: Pt participated in skilled OT interventions to address self care, transfers and endurance. Pt received sitting on the BSC on 3L O2 with sats >92%. Pt required min A to stand with gait belt and mod A to maintain static standing balance during perineal hygeine. Pt unable to participate in perineal hygeine secondary to poor  endurance, balance and standing tolerance requiring total assist. Pt fatigued with one minute of standing and required seated rest break prior to standing again for perineal hygeine. Pt fatigued quickly and requested to sit back down. On third stand perineal hygeine completed and pt able to pivot to the chair with mod A. Pt positioned for comfort in the chair. She is progressing with skilled OT services and will continue to benfit from OT during hospitilization to maximize independence.     Time Calculation:         Time Calculation- OT       Row Name 08/15/23 1459             Time Calculation- OT    OT Start Time 1428  -SP      OT Stop Time 1445  -SP      OT Time Calculation (min) 17 min  -SP      OT Received On 08/15/23  -SP      OT Goal Re-Cert Due Date 08/23/23  -SP         Timed Charges    65916 - OT Self Care/Mgmt Minutes 17  -SP         Total Minutes    Timed Charges Total Minutes 17  -SP       Total Minutes 17  -SP                User Key  (r) = Recorded By, (t) = Taken By, (c) = Cosigned By      Initials Name Provider Type    SP Zandra Cox OT Occupational Therapist                  Therapy Charges for Today       Code Description Service Date Service Provider Modifiers Qty    [id number] HC OT SELF CARE/MGMT/TRAIN EA 15 MIN 8/15/2023 Zandra Cox OT GO 1                 Zandra Cox OT  8/15/2023

## 2023-08-15 NOTE — CASE MANAGEMENT/SOCIAL WORK
Case Management/Social Work    Patient Name:  Gabi Dudley  YOB: 1945  MRN: [id number]  Admit Date:  8/7/2023        LIBIA spoke with Chelle at [facility] at [phone] to ask for an order with Pt. Tr[employer]'s settings. Chelle stated that she would send a message to the trilogy team and someone would call me back at [phone], LIBIA also provided Chelle with the fax number [phone]. LIBIA will call again and follow up if they do not call back. LIBIA/CM will continue to follow for discharge planning.       LIBIA was faxed [employer] settings and provided them to the Pt. Nurse. LIBIA also spoke with Magaly Chung at [facility], and she stated that she is going to wait to submit the Pt. for review until Pt. Is more medically stable and has seen therapy. LIBIA/CM will continue to follow for discharge planning.     Electronically signed by:  Davian Celeste  08/15/23 12:27 EDT

## 2023-08-15 NOTE — THERAPY TREATMENT NOTE
Patient Name: Gabi Dudley  : 1945    MRN: [id number]                              Today's Date: 8/15/2023       Admit Date: 2023    Visit Dx:     ICD-10-CM ICD-9-CM   1. Partial small bowel obstruction  K56.600 560.9   2. Atrial fibrillation with rapid ventricular response  I48.91 427.31     Patient Active Problem List   Diagnosis    Adrenal adenoma    Anxiety    Chronic fatigue    Fibromyalgia    Hyperlipidemia    Essential hypertension    Insomnia    Osteoarthritis of knee    Osteoporosis    Pulmonary emphysema    Simple renal cyst    Tension headache    Vitamin D deficiency    Diverticulosis    Inversion of nipple    Paroxysmal atrial fibrillation    Coronary artery disease involving native coronary artery of native heart without angina pectoris    Autonomic dysfunction    Gastroesophageal reflux disease without esophagitis    Urge incontinence    Erythrasma    Compression fracture of T5 vertebra    Lung nodule    COPD exacerbation    Overweight (BMI 25.0-29.9)    Acute on chronic respiratory failure with hypoxia    Acute on chronic respiratory failure with hypoxia and hypercapnia    Iron deficiency anemia    Impaired mobility and ADLs    Acute respiratory failure with hypoxia and hypercapnia    Aspiration pneumonia of right lower lobe    Partial small bowel obstruction     Past Medical History:   Diagnosis Date    Adrenal adenoma     Anemia     Arrhythmia     Asthma     Atrial fibrillation     Back pain     Benign colonic polyp     Benign tumor of adrenal gland     Cataract     bilateral    Cholelithiasis     Chronic bronchitis     Chronic bronchitis with COPD (chronic obstructive pulmonary disease)     COPD (chronic obstructive pulmonary disease)     Coronary artery disease     Depression     Diverticulosis Years ago    Elevated cholesterol     Environmental and seasonal allergies     Fibromyalgia     Fibromyalgia, primary Sometime in the s    Gastritis     Generalized anxiety disorder      GERD (gastroesophageal reflux disease)     H/O mammogram     Headache Years ago    Hemorrhoids     History of blood transfusion 1985    History of blood transfusion 1985    History of echocardiogram     History of endometriosis     History of nuclear stress test     Hospitalization or health care facility admission within last 6 months     5 times between 11/2022-05/2023    Hypertension     IBS (irritable bowel syndrome)     Impaired functional mobility, balance, gait, and endurance     Impaired mobility     Inverted nipple     Kidney disease     Kidney stone     Liver cyst     Low back pain Years ago    Nodular radiologic density     Nodule of left lung     On home oxygen therapy     2 liters NC QHS    Osteoarthritis     Osteopenia Several years ago    Osteoporosis     PONV (postoperative nausea and vomiting)     Renal cyst     Sinus problem     2014    Sinusitis     Skin cancer     basal cell carcinoma    SOB (shortness of breath)     Tobacco use     Urinary frequency     Urinary tract infection Years ago    Frequent UTIs    Vitamin D deficiency     Wears glasses     Wears partial dentures     upper plate     Past Surgical History:   Procedure Laterality Date    APPENDECTOMY  1980s    CARDIAC CATHETERIZATION  2003    CATARACT EXTRACTION  2013    both eyes    CHOLECYSTECTOMY  2020    CHOLECYSTECTOMY WITH INTRAOPERATIVE CHOLANGIOGRAM N/A 10/30/2020    Procedure: CHOLECYSTECTOMY LAPAROSCOPIC INTRAOPERATIVE CHOLANGIOGRAPHY;  Surgeon: Sima Moses MD;  Location: [facility] OR;  Service: General;  Laterality: N/A;    COLON SURGERY  2020    COLONOSCOPY  03/11/2013    COLONOSCOPY  06/21/2016    COLONOSCOPY N/A 07/24/2019    Procedure: COLONOSCOPY W/ COLD FORCEP POLYPECTOMIES; HOT SNARE POLYPECTOMIES; COLD SNARE POLYPECTOMY;  Surgeon: Goyo Nunez MD;  Location: [facility] ENDOSCOPY;  Service: Gastroenterology    COLONOSCOPY W/ BIOPSIES AND POLYPECTOMY      EXPLORATORY LAPAROTOMY N/A 11/23/2020    Procedure: colectomy,  right, closure of enterotomy x 2, reduction of internal volvulus;  Surgeon: Sima Moses MD;  Location:  [facility] OR;  Service: General;  Laterality: N/A;    EXPLORATORY LAPAROTOMY N/A 8/9/2023    Procedure: LAPAROTOMY EXPLORATORY WITH LYIS OF ADHESIONS AND  CENTRAL LINE INSERTION;  Surgeon: Bianca Isaac DO;  Location: [facility] OR;  Service: General;  Laterality: N/A;    HYSTERECTOMY  1980s    partial    LYSIS OF ABDOMINAL ADHESIONS      TONSILLECTOMY  1987    UPPER GASTROINTESTINAL ENDOSCOPY  01/13/2015    VAGINAL DELIVERY      x2      General Information       Row Name 08/15/23 1558          Physical Therapy Time and Intention    Document Type therapy note (daily note)  -     Mode of Treatment physical therapy  -       Row Name 08/15/23 1550          General Information    Patient Profile Reviewed yes  -RM     Existing Precautions/Restrictions fall;oxygen therapy device and L/min  3L  -RM       Row Name 08/15/23 1552          Cognition    Orientation Status (Cognition) oriented to;person;place;situation  -       Row Name 08/15/23 1555          Safety Issues, Functional Mobility    Safety Issues Affecting Function (Mobility) ability to follow commands;impulsivity;insight into deficits/self-awareness;safety precaution awareness;safety precautions follow-through/compliance  -RM     Impairments Affecting Function (Mobility) balance;endurance/activity tolerance;shortness of breath;strength;cognition  -RM     Cognitive Impairments, Mobility Safety/Performance attention;insight into deficits/self-awareness;safety precaution awareness;safety precaution follow-through  -RM               User Key  (r) = Recorded By, (t) = Taken By, (c) = Cosigned By      Initials Name Provider Type     Bebeto David, PTA Physical Therapist Assistant                   Mobility       Row Name 08/15/23 1559          Bed Mobility    Comment, (Bed Mobility) Pt sitting on BSC with nursing  -       Row Name 08/15/23 1552           Bed-Chair Transfer    Bed-Chair Todd (Transfers) moderate assist (50% patient effort);1 person to manage equipment  -RM     Assistive Device (Bed-Chair Transfers) other (see comments)  gait belt  -RM       Row Name 08/15/23 1559          Sit-Stand Transfer    Sit-Stand Todd (Transfers) minimum assist (75% patient effort)  -RM     Assistive Device (Sit-Stand Transfers) other (see comments)  gait belt  -RM     Comment, (Sit-Stand Transfer) x3  -RM       Row Name 08/15/23 1559          Gait/Stairs (Locomotion)    Todd Level (Gait) not tested  -RM               User Key  (r) = Recorded By, (t) = Taken By, (c) = Cosigned By      Initials Name Provider Type    RM Bebeto David, PTA Physical Therapist Assistant                   Obj/Interventions    No documentation.                  Goals/Plan    No documentation.                  Clinical Impression       Row Name 08/15/23 1600          Pain    Pretreatment Pain Rating 0/10 - no pain  -RM     Posttreatment Pain Rating 0/10 - no pain  -RM       Row Name 08/15/23 1600          Plan of Care Review    Plan of Care Reviewed With patient  -RM     Progress improving  -RM     Outcome Evaluation Pt sitting on BSC per nursing with 3L O2.  Pt performed x 3 sit to stand from BSC with min a . Pt however required mod a to maintain static stand. Each stand lasting 10-20 seconds.  Pt was mod a with gait belt to transfer from BSC to bedside recliner.  See flowsheet for details  -RM       Row Name 08/15/23 1600          Vital Signs    O2 Delivery Pre Treatment supplemental O2  -RM     O2 Delivery Intra Treatment supplemental O2  -RM     O2 Delivery Post Treatment supplemental O2  -RM     Pre Patient Position Sitting  -RM     Intra Patient Position Standing  -RM     Post Patient Position Sitting  -RM       Row Name 08/15/23 1600          Positioning and Restraints    Pre-Treatment Position bedside commode  -RM     Post Treatment Position chair  -RM     In  Chair reclined;call light within reach;encouraged to call for assist;notified nsg  -RM               User Key  (r) = Recorded By, (t) = Taken By, (c) = Cosigned By      Initials Name Provider Type    Bebeto Laird, RAYRAY Physical Therapist Assistant                   Outcome Measures       Row Name 08/15/23 1603          How much help from another person do you currently need...    Turning from your back to your side while in flat bed without using bedrails? 2  -RM     Moving from lying on back to sitting on the side of a flat bed without bedrails? 2  -RM     Moving to and from a bed to a chair (including a wheelchair)? 2  -RM     Standing up from a chair using your arms (e.g., wheelchair, bedside chair)? 2  -RM     Climbing 3-5 steps with a railing? 1  -RM     To walk in hospital room? 1  -RM     AM-PAC 6 Clicks Score (PT) 10  -RM     Highest level of mobility 4 --> Transferred to chair/commode  -       Row Name 08/15/23 1603 08/15/23 1458       Functional Assessment    Outcome Measure Options AM-PAC 6 Clicks Basic Mobility (PT)  -RM AM-PAC 6 Clicks Daily Activity (OT)  -SP              User Key  (r) = Recorded By, (t) = Taken By, (c) = Cosigned By      Initials Name Provider Type    Bebeto Laird, PTA Physical Therapist Assistant    Zandra Butt OT Occupational Therapist                                 Physical Therapy Education       Title: PT OT SLP Therapies (In Progress)       Topic: Physical Therapy (In Progress)       Point: Mobility training (Done)       Learning Progress Summary             Patient Acceptance, E,TB,D, VU,NR by  at 8/15/2023 1603    Comment: Importance of activity    Acceptance, E, NR by AM at 8/13/2023 0452    Acceptance, E, NR,NL by NL at 8/10/2023 1106    Comment: Patient motivated to participate in movement due to its benefits.                         Point: Home exercise program (Not Started)       Learner Progress:  Not documented in this visit.              Point:  Body mechanics (Not Started)       Learner Progress:  Not documented in this visit.              Point: Precautions (Not Started)       Learner Progress:  Not documented in this visit.                              User Key       Initials Effective Dates Name Provider Type Discipline     06/16/21 -  Bebeto David PTA Physical Therapist Assistant PT    AM 02/22/23 -  Gio Wang, RN Registered Nurse Nurse    NL 07/13/23 -  Carlo Gruber, HANS Student PT Student PT                  PT Recommendation and Plan     Plan of Care Reviewed With: patient  Progress: improving  Outcome Evaluation: Pt sitting on BSC per nursing with 3L O2.  Pt performed x 3 sit to stand from BSC with min a . Pt however required mod a to maintain static stand. Each stand lasting 10-20 seconds.  Pt was mod a with gait belt to transfer from BSC to bedside recliner.  See flowsheet for details     Time Calculation:         PT Charges       Row Name 08/15/23 1604             Time Calculation    Start Time 1428  -RM      Stop Time 1446  -RM      Time Calculation (min) 18 min  -RM      PT Received On 08/15/23  -RM      PT Goal Re-Cert Due Date 08/20/23  -RM         Time Calculation- PT    Total Timed Code Minutes- PT 18 minute(s)  -RM         Timed Charges    75537 - PT Therapeutic Activity Minutes 18  -RM         Total Minutes    Timed Charges Total Minutes 18  -RM       Total Minutes 18  -RM                User Key  (r) = Recorded By, (t) = Taken By, (c) = Cosigned By      Initials Name Provider Type     Bebeto David PTA Physical Therapist Assistant                  Therapy Charges for Today       Code Description Service Date Service Provider Modifiers Qty    81240466886 HC PT THERAPEUTIC ACT EA 15 MIN 8/15/2023 Bebeto David PTA GP 1            PT G-Codes  Outcome Measure Options: AM-PAC 6 Clicks Basic Mobility (PT)  AM-PAC 6 Clicks Score (PT): 10  AM-PAC 6 Clicks Score (OT): 13       Bebeto David PTA  8/15/2023

## 2023-08-15 NOTE — PLAN OF CARE
Goal Outcome Evaluation:  Plan of Care Reviewed With: patient        Progress: improving  Outcome Evaluation: Pt sitting on BSC per nursing with 3L O2.  Pt performed x 3 sit to stand from BSC with min a . Pt however required mod a to maintain static stand. Each stand lasting 10-20 seconds.  Pt was mod a with gait belt to transfer from BSC to bedside recliner.  See flowsheet for details

## 2023-08-15 NOTE — PLAN OF CARE
Goal Outcome Evaluation:   Pt. Has been up several times to recliner and bsc. Tldl removed and Ng. Pt. Has had several loose bms today. Have tried encourage bipap. Pt only agreed to wear from 12 to 1245  Vitals stable will continue to monitor.

## 2023-08-16 LAB
A-A DO2: 76.2 MMHG
ANION GAP SERPL CALCULATED.3IONS-SCNC: 8.1 MMOL/L (ref 5–15)
ANISOCYTOSIS BLD QL: NORMAL
ARTERIAL PATENCY WRIST A: ABNORMAL
ATMOSPHERIC PRESS: 734 MMHG
BASE EXCESS BLDA CALC-SCNC: 9.6 MMOL/L (ref 0–2)
BASOPHILS # BLD AUTO: 0.02 10*3/MM3 (ref 0–0.2)
BASOPHILS NFR BLD AUTO: 0.2 % (ref 0–1.5)
BDY SITE: ABNORMAL
BUN SERPL-MCNC: 19 MG/DL (ref 8–23)
BUN/CREAT SERPL: 26.8 (ref 7–25)
CALCIUM SPEC-SCNC: 7.7 MG/DL (ref 8.6–10.5)
CHLORIDE SERPL-SCNC: 103 MMOL/L (ref 98–107)
CO2 SERPL-SCNC: 32.9 MMOL/L (ref 22–29)
COHGB MFR BLD: 0.8 % (ref 0–2)
CREAT SERPL-MCNC: 0.71 MG/DL (ref 0.57–1)
DEPRECATED RDW RBC AUTO: 62.8 FL (ref 37–54)
EGFRCR SERPLBLD CKD-EPI 2021: 88.2 ML/MIN/1.73
EOSINOPHIL # BLD AUTO: 0 10*3/MM3 (ref 0–0.4)
EOSINOPHIL NFR BLD AUTO: 0 % (ref 0.3–6.2)
ERYTHROCYTE [DISTWIDTH] IN BLOOD BY AUTOMATED COUNT: 21.1 % (ref 12.3–15.4)
GAS FLOW AIRWAY: 3 LPM
GLUCOSE SERPL-MCNC: 99 MG/DL (ref 65–99)
HCO3 BLDA-SCNC: 36.8 MMOL/L (ref 22–28)
HCT VFR BLD AUTO: 34.3 % (ref 34–46.6)
HCT VFR BLD CALC: 30.4 %
HGB BLD-MCNC: 9.9 G/DL (ref 12–15.9)
HYPOCHROMIA BLD QL: NORMAL
IMM GRANULOCYTES # BLD AUTO: 0.08 10*3/MM3 (ref 0–0.05)
IMM GRANULOCYTES NFR BLD AUTO: 0.9 % (ref 0–0.5)
INHALED O2 CONCENTRATION: 32 %
LYMPHOCYTES # BLD AUTO: 0.54 10*3/MM3 (ref 0.7–3.1)
LYMPHOCYTES NFR BLD AUTO: 6 % (ref 19.6–45.3)
Lab: ABNORMAL
MCH RBC QN AUTO: 23.9 PG (ref 26.6–33)
MCHC RBC AUTO-ENTMCNC: 28.9 G/DL (ref 31.5–35.7)
MCV RBC AUTO: 82.9 FL (ref 79–97)
METHGB BLD QL: 0.7 % (ref 0–1.5)
MODALITY: ABNORMAL
MONOCYTES # BLD AUTO: 0.72 10*3/MM3 (ref 0.1–0.9)
MONOCYTES NFR BLD AUTO: 8 % (ref 5–12)
NEUTROPHILS NFR BLD AUTO: 7.6 10*3/MM3 (ref 1.7–7)
NEUTROPHILS NFR BLD AUTO: 84.9 % (ref 42.7–76)
NRBC BLD AUTO-RTO: 0 /100 WBC (ref 0–0.2)
OXYHGB MFR BLDV: 90.8 % (ref 94–99)
PCO2 BLDA: 65.5 MM HG (ref 35–45)
PCO2 TEMP ADJ BLD: ABNORMAL MM[HG]
PH BLDA: 7.36 PH UNITS (ref 7.35–7.45)
PH, TEMP CORRECTED: ABNORMAL
PLATELET # BLD AUTO: 202 10*3/MM3 (ref 140–450)
PMV BLD AUTO: 11.3 FL (ref 6–12)
PO2 BLDA: 70.7 MM HG (ref 75–100)
PO2 TEMP ADJ BLD: ABNORMAL MM[HG]
POIKILOCYTOSIS BLD QL SMEAR: NORMAL
POTASSIUM SERPL-SCNC: 3.7 MMOL/L (ref 3.5–5.2)
RBC # BLD AUTO: 4.14 10*6/MM3 (ref 3.77–5.28)
SAO2 % BLDCOA: 92.2 % (ref 94–100)
SMALL PLATELETS BLD QL SMEAR: ADEQUATE
SODIUM SERPL-SCNC: 144 MMOL/L (ref 136–145)
VENTILATOR MODE: ABNORMAL
WBC MORPH BLD: NORMAL
WBC NRBC COR # BLD: 8.96 10*3/MM3 (ref 3.4–10.8)

## 2023-08-16 PROCEDURE — 94799 UNLISTED PULMONARY SVC/PX: CPT

## 2023-08-16 PROCEDURE — 94664 DEMO&/EVAL PT USE INHALER: CPT

## 2023-08-16 PROCEDURE — 80048 BASIC METABOLIC PNL TOTAL CA: CPT | Performed by: FAMILY MEDICINE

## 2023-08-16 PROCEDURE — 25010000002 HYDROMORPHONE 1 MG/ML SOLUTION: Performed by: INTERNAL MEDICINE

## 2023-08-16 PROCEDURE — 97110 THERAPEUTIC EXERCISES: CPT

## 2023-08-16 PROCEDURE — 25010000002 PIPERACILLIN SOD-TAZOBACTAM PER 1 G: Performed by: INTERNAL MEDICINE

## 2023-08-16 PROCEDURE — 25010000002 ZIPRASIDONE MESYLATE PER 10 MG: Performed by: INTERNAL MEDICINE

## 2023-08-16 PROCEDURE — 85007 BL SMEAR W/DIFF WBC COUNT: CPT | Performed by: FAMILY MEDICINE

## 2023-08-16 PROCEDURE — 25010000002 CHLOROTHIAZIDE PER 500 MG: Performed by: INTERNAL MEDICINE

## 2023-08-16 PROCEDURE — 97535 SELF CARE MNGMENT TRAINING: CPT

## 2023-08-16 PROCEDURE — 97530 THERAPEUTIC ACTIVITIES: CPT

## 2023-08-16 PROCEDURE — 83050 HGB METHEMOGLOBIN QUAN: CPT

## 2023-08-16 PROCEDURE — 82805 BLOOD GASES W/O2 SATURATION: CPT

## 2023-08-16 PROCEDURE — 63710000001 PREDNISONE PER 1 MG: Performed by: INTERNAL MEDICINE

## 2023-08-16 PROCEDURE — 82375 ASSAY CARBOXYHB QUANT: CPT

## 2023-08-16 PROCEDURE — 36600 WITHDRAWAL OF ARTERIAL BLOOD: CPT

## 2023-08-16 PROCEDURE — 25010000002 METHYLPREDNISOLONE PER 125 MG: Performed by: INTERNAL MEDICINE

## 2023-08-16 PROCEDURE — 99024 POSTOP FOLLOW-UP VISIT: CPT | Performed by: STUDENT IN AN ORGANIZED HEALTH CARE EDUCATION/TRAINING PROGRAM

## 2023-08-16 PROCEDURE — 99232 SBSQ HOSP IP/OBS MODERATE 35: CPT | Performed by: FAMILY MEDICINE

## 2023-08-16 PROCEDURE — 25010000002 ENOXAPARIN PER 10 MG: Performed by: INTERNAL MEDICINE

## 2023-08-16 PROCEDURE — 97116 GAIT TRAINING THERAPY: CPT

## 2023-08-16 PROCEDURE — 92526 ORAL FUNCTION THERAPY: CPT

## 2023-08-16 PROCEDURE — 85025 COMPLETE CBC W/AUTO DIFF WBC: CPT | Performed by: FAMILY MEDICINE

## 2023-08-16 PROCEDURE — 94761 N-INVAS EAR/PLS OXIMETRY MLT: CPT

## 2023-08-16 PROCEDURE — 99233 SBSQ HOSP IP/OBS HIGH 50: CPT | Performed by: INTERNAL MEDICINE

## 2023-08-16 PROCEDURE — 25010000002 DIPHENHYDRAMINE PER 50 MG: Performed by: INTERNAL MEDICINE

## 2023-08-16 RX ORDER — QUETIAPINE FUMARATE 25 MG/1
12.5 TABLET, FILM COATED ORAL NIGHTLY
Status: DISCONTINUED | OUTPATIENT
Start: 2023-08-16 | End: 2023-08-18 | Stop reason: HOSPADM

## 2023-08-16 RX ORDER — ACETAMINOPHEN 650 MG/1
650 SUPPOSITORY RECTAL EVERY 4 HOURS PRN
Status: DISCONTINUED | OUTPATIENT
Start: 2023-08-16 | End: 2023-08-18 | Stop reason: HOSPADM

## 2023-08-16 RX ORDER — ACETAMINOPHEN 325 MG/1
650 TABLET ORAL EVERY 4 HOURS PRN
Status: DISCONTINUED | OUTPATIENT
Start: 2023-08-16 | End: 2023-08-18 | Stop reason: HOSPADM

## 2023-08-16 RX ORDER — CHOLECALCIFEROL (VITAMIN D3) 125 MCG
5 CAPSULE ORAL NIGHTLY PRN
Status: DISCONTINUED | OUTPATIENT
Start: 2023-08-16 | End: 2023-08-18 | Stop reason: HOSPADM

## 2023-08-16 RX ORDER — BUDESONIDE AND FORMOTEROL FUMARATE DIHYDRATE 160; 4.5 UG/1; UG/1
2 AEROSOL RESPIRATORY (INHALATION)
Status: DISCONTINUED | OUTPATIENT
Start: 2023-08-16 | End: 2023-08-18 | Stop reason: HOSPADM

## 2023-08-16 RX ORDER — AMIODARONE HYDROCHLORIDE 200 MG/1
200 TABLET ORAL
Status: DISCONTINUED | OUTPATIENT
Start: 2023-08-16 | End: 2023-08-18 | Stop reason: HOSPADM

## 2023-08-16 RX ORDER — DOCUSATE SODIUM 50 MG/5 ML
100 LIQUID (ML) ORAL 2 TIMES DAILY
Status: DISCONTINUED | OUTPATIENT
Start: 2023-08-16 | End: 2023-08-18 | Stop reason: HOSPADM

## 2023-08-16 RX ORDER — PROCHLORPERAZINE 25 MG
25 SUPPOSITORY, RECTAL RECTAL EVERY 12 HOURS PRN
Status: DISCONTINUED | OUTPATIENT
Start: 2023-08-16 | End: 2023-08-18 | Stop reason: HOSPADM

## 2023-08-16 RX ORDER — BISACODYL 10 MG
10 SUPPOSITORY, RECTAL RECTAL DAILY PRN
Status: DISCONTINUED | OUTPATIENT
Start: 2023-08-16 | End: 2023-08-18 | Stop reason: HOSPADM

## 2023-08-16 RX ORDER — TRAZODONE HYDROCHLORIDE 50 MG/1
100 TABLET ORAL NIGHTLY
Status: DISCONTINUED | OUTPATIENT
Start: 2023-08-16 | End: 2023-08-18 | Stop reason: HOSPADM

## 2023-08-16 RX ORDER — POLYETHYLENE GLYCOL 3350 17 G/17G
17 POWDER, FOR SOLUTION ORAL DAILY PRN
Status: DISCONTINUED | OUTPATIENT
Start: 2023-08-16 | End: 2023-08-18 | Stop reason: HOSPADM

## 2023-08-16 RX ORDER — ACETAMINOPHEN 160 MG/5ML
650 SOLUTION ORAL EVERY 4 HOURS PRN
Status: DISCONTINUED | OUTPATIENT
Start: 2023-08-16 | End: 2023-08-18 | Stop reason: HOSPADM

## 2023-08-16 RX ORDER — PREDNISONE 20 MG/1
40 TABLET ORAL
Status: COMPLETED | OUTPATIENT
Start: 2023-08-16 | End: 2023-08-18

## 2023-08-16 RX ORDER — CLONAZEPAM 0.5 MG/1
0.5 TABLET ORAL DAILY
Status: DISCONTINUED | OUTPATIENT
Start: 2023-08-16 | End: 2023-08-18 | Stop reason: HOSPADM

## 2023-08-16 RX ORDER — PROCHLORPERAZINE EDISYLATE 5 MG/ML
5 INJECTION INTRAMUSCULAR; INTRAVENOUS EVERY 6 HOURS PRN
Status: DISCONTINUED | OUTPATIENT
Start: 2023-08-16 | End: 2023-08-18 | Stop reason: HOSPADM

## 2023-08-16 RX ORDER — PROCHLORPERAZINE MALEATE 10 MG
5 TABLET ORAL EVERY 6 HOURS PRN
Status: DISCONTINUED | OUTPATIENT
Start: 2023-08-16 | End: 2023-08-18 | Stop reason: HOSPADM

## 2023-08-16 RX ADMIN — DULOXETINE HYDROCHLORIDE 60 MG: 30 CAPSULE, DELAYED RELEASE ORAL at 08:53

## 2023-08-16 RX ADMIN — DILTIAZEM HYDROCHLORIDE 60 MG: 30 TABLET, FILM COATED ORAL at 12:59

## 2023-08-16 RX ADMIN — HYDROMORPHONE HYDROCHLORIDE 1 MG: 1 INJECTION, SOLUTION INTRAMUSCULAR; INTRAVENOUS; SUBCUTANEOUS at 12:59

## 2023-08-16 RX ADMIN — NYSTATIN 500000 UNITS: 100000 SUSPENSION ORAL at 19:25

## 2023-08-16 RX ADMIN — BUDESONIDE AND FORMOTEROL FUMARATE DIHYDRATE 2 PUFF: 160; 4.5 AEROSOL RESPIRATORY (INHALATION) at 18:45

## 2023-08-16 RX ADMIN — DIPHENHYDRAMINE HYDROCHLORIDE 25 MG: 50 INJECTION INTRAMUSCULAR; INTRAVENOUS at 05:50

## 2023-08-16 RX ADMIN — Medication 10 ML: at 08:53

## 2023-08-16 RX ADMIN — Medication 10 ML: at 09:18

## 2023-08-16 RX ADMIN — ACETAMINOPHEN 650 MG: 325 TABLET, FILM COATED ORAL at 23:09

## 2023-08-16 RX ADMIN — PIPERACILLIN SODIUM AND TAZOBACTAM SODIUM 3.38 G: 3; .375 INJECTION, SOLUTION INTRAVENOUS at 23:20

## 2023-08-16 RX ADMIN — CETIRIZINE HYDROCHLORIDE 10 MG: 10 TABLET, FILM COATED ORAL at 08:53

## 2023-08-16 RX ADMIN — FLUTICASONE PROPIONATE 2 SPRAY: 50 SPRAY, METERED NASAL at 21:25

## 2023-08-16 RX ADMIN — FLUTICASONE PROPIONATE 2 SPRAY: 50 SPRAY, METERED NASAL at 08:59

## 2023-08-16 RX ADMIN — Medication 10 ML: at 09:00

## 2023-08-16 RX ADMIN — PREDNISONE 40 MG: 20 TABLET ORAL at 12:59

## 2023-08-16 RX ADMIN — ENOXAPARIN SODIUM 70 MG: 100 INJECTION SUBCUTANEOUS at 08:54

## 2023-08-16 RX ADMIN — NYSTATIN 500000 UNITS: 100000 SUSPENSION ORAL at 08:53

## 2023-08-16 RX ADMIN — METHYLPREDNISOLONE SODIUM SUCCINATE 60 MG: 125 INJECTION, POWDER, LYOPHILIZED, FOR SOLUTION INTRAMUSCULAR; INTRAVENOUS at 08:54

## 2023-08-16 RX ADMIN — PIPERACILLIN SODIUM AND TAZOBACTAM SODIUM 3.38 G: 3; .375 INJECTION, SOLUTION INTRAVENOUS at 08:42

## 2023-08-16 RX ADMIN — PIPERACILLIN SODIUM AND TAZOBACTAM SODIUM 3.38 G: 3; .375 INJECTION, SOLUTION INTRAVENOUS at 00:38

## 2023-08-16 RX ADMIN — Medication 5 MG: at 23:09

## 2023-08-16 RX ADMIN — Medication 10 ML: at 23:10

## 2023-08-16 RX ADMIN — AMIODARONE HYDROCHLORIDE 200 MG: 200 TABLET ORAL at 08:53

## 2023-08-16 RX ADMIN — BUDESONIDE 1 MG: 0.5 INHALANT RESPIRATORY (INHALATION) at 06:50

## 2023-08-16 RX ADMIN — PIPERACILLIN SODIUM AND TAZOBACTAM SODIUM 3.38 G: 3; .375 INJECTION, SOLUTION INTRAVENOUS at 16:40

## 2023-08-16 RX ADMIN — IPRATROPIUM BROMIDE AND ALBUTEROL SULFATE 3 ML: 2.5; .5 SOLUTION RESPIRATORY (INHALATION) at 06:50

## 2023-08-16 RX ADMIN — TIOTROPIUM BROMIDE INHALATION SPRAY 2 PUFF: 3.12 SPRAY, METERED RESPIRATORY (INHALATION) at 06:50

## 2023-08-16 RX ADMIN — ATORVASTATIN CALCIUM 20 MG: 20 TABLET, FILM COATED ORAL at 08:53

## 2023-08-16 RX ADMIN — TRAZODONE HYDROCHLORIDE 100 MG: 50 TABLET ORAL at 21:25

## 2023-08-16 RX ADMIN — DILTIAZEM HYDROCHLORIDE 60 MG: 30 TABLET, FILM COATED ORAL at 23:09

## 2023-08-16 RX ADMIN — SERTRALINE 100 MG: 50 TABLET, FILM COATED ORAL at 21:27

## 2023-08-16 RX ADMIN — CLONAZEPAM 0.5 MG: 0.5 TABLET ORAL at 08:53

## 2023-08-16 RX ADMIN — HYDROMORPHONE HYDROCHLORIDE 1 MG: 1 INJECTION, SOLUTION INTRAMUSCULAR; INTRAVENOUS; SUBCUTANEOUS at 02:05

## 2023-08-16 RX ADMIN — QUETIAPINE FUMARATE 12.5 MG: 25 TABLET ORAL at 21:26

## 2023-08-16 RX ADMIN — IPRATROPIUM BROMIDE AND ALBUTEROL SULFATE 3 ML: 2.5; .5 SOLUTION RESPIRATORY (INHALATION) at 13:22

## 2023-08-16 RX ADMIN — NYSTATIN 500000 UNITS: 100000 SUSPENSION ORAL at 12:59

## 2023-08-16 RX ADMIN — CHLOROTHIAZIDE SODIUM 500 MG: 500 INJECTION, POWDER, LYOPHILIZED, FOR SOLUTION INTRAVENOUS at 08:54

## 2023-08-16 RX ADMIN — IPRATROPIUM BROMIDE AND ALBUTEROL SULFATE 3 ML: 2.5; .5 SOLUTION RESPIRATORY (INHALATION) at 18:45

## 2023-08-16 RX ADMIN — DILTIAZEM HYDROCHLORIDE 60 MG: 30 TABLET, FILM COATED ORAL at 05:50

## 2023-08-16 RX ADMIN — DILTIAZEM HYDROCHLORIDE 60 MG: 30 TABLET, FILM COATED ORAL at 19:25

## 2023-08-16 RX ADMIN — METOPROLOL TARTRATE 12.5 MG: 25 TABLET, FILM COATED ORAL at 21:25

## 2023-08-16 RX ADMIN — METOPROLOL TARTRATE 12.5 MG: 25 TABLET, FILM COATED ORAL at 08:53

## 2023-08-16 RX ADMIN — DILTIAZEM HYDROCHLORIDE 60 MG: 30 TABLET, FILM COATED ORAL at 00:38

## 2023-08-16 RX ADMIN — ZIPRASIDONE MESYLATE 10 MG: 20 INJECTION, POWDER, LYOPHILIZED, FOR SOLUTION INTRAMUSCULAR at 04:32

## 2023-08-16 RX ADMIN — NYSTATIN 500000 UNITS: 100000 SUSPENSION ORAL at 21:26

## 2023-08-16 RX ADMIN — ARFORMOTEROL TARTRATE 15 MCG: 15 SOLUTION RESPIRATORY (INHALATION) at 06:50

## 2023-08-16 NOTE — PLAN OF CARE
Goal Outcome Evaluation:           Progress: no change  Outcome Evaluation: Pt ambulated to chair during day and diet advanced to mechanical soft; pt tolerating both without issues and is toerating well. Pt continues to refuses BIPAP, attempts to educate consistently. NC 4L placed when not on BIPAP. BM x1 today. pt has been in NSR most of shift but reverted once into afib. Will continue to monitor, pt is nonsymptomatic.

## 2023-08-16 NOTE — PROGRESS NOTES
[facility]    Name:  Gabi Dudley   Age:  78 y.o.  Sex:  female  :  1945  MRN:  [id number]   Visit Number:  [id number]  Admission Date:  2023  Date Of Service:  23  Primary Care Physician:  Paul Quinteros MD     LOS: 7 days :    Chief Complaint:      Anxiety     Subjective:    Patient was seen and examined at bedside today.  Patient seen sitting up on the side of the bed with family member at bedside. Patient fully AAOx3 and at baseline. Patient appears very comfortable, pleasant and more energetic today, she reports that she is excited about having ability to eat now.  She denies abdominal pain or nausea or vomiting.  Having bowel movements.  Patient was noncompliant with NIV device last night.  No other complaints currently.  Patient on 3 L per nasal cannula, otherwise vitals are stable.    Hospital Course:    Patient is a chronically ill 78-year-old female history significant for hypertension, hyperlipidemia, depression/anxiety and CAD who presents to the emergency room with 3 to 4 days of progressively worsening abdominal pain.  Patient describes the pain as intermittent and sharp, diffuse across her abdomen.  Patient has chronic constipation so cannot recall her last bowel movement.  Admits to some mild nausea, denies vomiting.  Patient does have a history of small bowel obstruction which required colectomy .  States pain is similar but not as severe as previous episode.  Nothing makes it better or worse.  Patient does admit to still passing gas.    Upon arrival to the ER, patient afebrile hemodynamically stable and nonhypoxic.  CMP unremarkable except for glucose 142.  Lactate lipase normal.  WBC 13, hemoglobin hematocrit at baseline, platelets 112.  UA negative for urinary tract infection.  CT abdomen pelvis revealed mid small bowel dilation worrisome for partial small bowel obstruction.  Dr. Isaac agreed to consult hospital service asked  to admit.    Hospital course:  -8/14/2023 patient continues to have episodes of agitation please see Dr. Dewitt note from overnight.  Currently comfortable on the BiPAP.  Still with NG output about 500 overnight per Gio the night nurse.  Discussed with Dr. Poe and Dr. Isaac. D/w Farida her daughter. D/w Pharmacy resuming meds. Numerous serotonergic meds. Reducing zoloft dose. Has had bm the last two days.    -8/13/2023: Patient had an episode of respiratory distress today.  Was trying to come off the BiPAP.  Had some upper airway sounds concerning for stridor.  Was given medications to help BiPAP synchrony including Haldol Benadryl Ativan.  ABG reviewed.  Following this her heart rate normalized from the 160s to the 80s went from atrial fibrillation back to sinus rhythm.  Went down for a CT which ruled out tracheal obstruction.  Updated Dr. Poe on these changes.  Discussed with family.    -8/12/2023: Saw patient at the bedside more awake conversant today.  Family had concerns addressed. Dr. Simon saw. MRI reviewed with family at bedside. Patient had increased alertness but also too increased agitation for which I added medication in hopes of controlling this. Not hematuria in martinez bag but good urine output gave 1 u prbc.    -8/11/2023 patient with disorientation this morning.  Decreased pain medicine dose ordered CT of the head which was negative for acute process.    -8/10/2023: Patient converted to sinus rhythm this morning.  Dr. Curry's note reviewed.  Was taken to the OR for adhesiolysis by Dr. Isaac with assist from Dr. Bowden last evening.  Currently n.p.o. with NG tube in place.    -8/9/2023 patient with RVR overnight was started on Cardizem but that did not help.  Persistent pain discussed with Dr. Isaac taking  to the OR.  Discussed with Dr. Curry who recommended amiodarone and discussed perioperative risk management with Dr. Isaac.  8/8/2023 Pain not well controlled increased morphine dose from 1 mg  to 2 mg    Review of Systems:     All systems were reviewed and negative except as mentioned in subjective, assessment and plan.    Vital Signs:    Temp:  [97.2 øF (36.2 øC)-98.7 øF (37.1 øC)] 97.8 øF (36.6 øC)  Heart Rate:  [62-89] 69  Resp:  [18-30] 18  BP: ()/(55-96) 120/55    Intake and output:    I/O last 3 completed shifts:  In: 801 [P.O.:340; I.V.:281; Other:180]  Out: 4345 [Urine:4225; Emesis/NG output:120]  I/O this shift:  In: 200 [P.O.:200]  Out: -     Physical Examination:    General Appearance:  Alert and cooperative.  Chronically ill-appearing.  No acute distress.   Head:  Atraumatic and normocephalic.   Eyes: Conjunctivae and sclerae normal, no icterus. No pallor.   Throat: No oral lesions, no thrush, oral mucosa moist.   Neck: Supple, trachea midline, no thyromegaly.   Lungs:   Breath sounds heard bilaterally equally.  Bilateral wheezing.  No crackles.  Prolonged expiration.  Decreased air movement bilaterally.    Heart:  Normal S1 and S2, no murmur, no gallop, no rub. No JVD.   Abdomen:   Normal bowel sounds, no masses, no organomegaly. Soft, nontender, nondistended, no rebound tenderness.  Midline incision appears clean with dry dressing, nontender to palpation.  No evidence of infection.   Extremities: Supple, no edema, no cyanosis, no clubbing.   Skin: No bleeding or rash.   Neurologic: Alert and oriented x 3. No facial asymmetry. Moves all four limbs. No tremors.      Laboratory results:    Results from last 7 days   Lab Units 08/16/23  0500 08/15/23  0422 08/14/23  1501 08/14/23  0427 08/13/23  1350 08/13/23  0352   SODIUM mmol/L 144 145  --  148*   < > 150*   POTASSIUM mmol/L 3.7 3.9 3.6 3.4*   < > 3.3*   CHLORIDE mmol/L 103 107  --  107   < > 106   CO2 mmol/L 32.9* 28.7  --  31.6*   < > 30.7*   BUN mg/dL 19 20  --  30*   < > 31*   CREATININE mg/dL 0.71 0.66  --  0.59   < > 0.72   CALCIUM mg/dL 7.7* 7.7*  --  7.8*   < > 8.2*   BILIRUBIN mg/dL  --  0.6  --  0.4  --  0.4   ALK PHOS U/L  --   48  --  48  --  53   ALT (SGPT) U/L  --  22  --  22  --  21   AST (SGOT) U/L  --  22  --  21  --  19   GLUCOSE mg/dL 99 81  --  125*   < > 150*    < > = values in this interval not displayed.       Results from last 7 days   Lab Units 08/16/23  0500 08/15/23  0422 08/14/23  0427   WBC 10*3/mm3 8.96 10.86* 8.02   HEMOGLOBIN g/dL 9.9* 9.8* 9.3*   HEMATOCRIT % 34.3 34.0 32.3*   PLATELETS 10*3/mm3 202 199 172                   Recent Labs     08/13/23  1030 08/15/23  0715 08/16/23  0731   PHART 7.388 7.364 7.358   ZUT7XDE 49.9* 55.3* 65.5*   PO2ART 112.0* 306.0* 70.7*   PPN5ZWB 30.1* 31.5* 36.8*   BASEEXCESS 4.2* 5.0* 9.6*        I have reviewed the patient's laboratory results.    Radiology results:    XR Chest 1 View    Result Date: 8/15/2023  PROCEDURE: XR CHEST 1 VW-  HISTORY: Resp Failure.; K56.600-Partial intestinal obstruction, unspecified as to cause; I48.91-Unspecified atrial fibrillation  COMPARISON: 2 days prior.  FINDINGS: The heart is mildly enlarged, stable. Patient is rotated to the right. Right internal jugular catheter and NG tube are stable in position.. Mild interstitial disease noted bilaterally, stable. There is mild left basilar atelectasis or infiltrate similar to prior exam.. The mediastinum is unremarkable. There is no pneumothorax.  There are no acute osseous abnormalities.      Impression: Stable chest..      This report was signed and finalized on 8/15/2023 8:00 AM by Angeles Collins MD.     I have reviewed the patient's radiology reports.    Medication Review:     I have reviewed the patient's active and prn medications.     Problem List:      Partial small bowel obstruction      Assessment:    Partial small bowel obstruction due to volvulus.    Acute on Chronic respiratory failure s/p intubation  History of colectomy due to obstruction in 2018  Paroxysmal A-fib with RVR   Hypertension  Hyperlipidemia  Depression/anxiety  GERD  Delirium    Plan:    Partial small bowel obstruction due to  volvulus.    -S/p exploratory laparotomy, adhesiolysis and reduction of small bowel volvulus by Dr. Isaac on 8/9/2023.  -Dr. Isaac with general surgery following, appreciate recommendations.  -Plan for removing NG tube and advancing diet per surgery.  Speech evaluation ordered.  -Continue pain control  -PT/OT consulted.    Acute on Chronic respiratory failure s/p intubation  HAP  Severe COPD  -Pulmonology following, appreciate their recommendations.  -Continue supplemental oxygen and NIV therapy, patient is currently on 2 to 3 L through nasal cannula.   -Switched to p.o. prednisone x3 days  -Bronchodilators  -Given Diuril  -Continuing IV Zosyn antibiotics for HAP x5 days.   - Due to significant COPD and history of smoking, need to be followed outpatient with repeat CT in 8-12 weeks or so.    Paroxysmal A-fib with RVR   -Cardiology consulted and evaluated the patient.  -Was on therapeutic Lovenox, will switch her back to Eliquis today.  -Continue po amiodarone, Lopressor and Cardizem    Hypertension  -Continue antihypertensives  -as needed hydralazine and labetalol.    Delirium, resolved  -Appears to be improved  -Neurology have evaluated the patient previously.   -Likely metabolic and due to being hospitalized and respiratory status.  -Continue Vistaril, Klonopin and Geodon as needed.    Anemia.    -Hemoglobin has remained stable status post transfusion  -Continue close monitoring  -Hematuria resolved.  Suspect was traumatic due to agitation and pulling at lines.      -Further orders as indicated per clinical course.    DVT Prophylaxis: Eliquis  Code Status: Full  Diet: Advance to regular as tolerated  Discharge Plan: To be determined, likely 1 to 2 days in the hospital then to [facility].    Tom Keyes MD  08/16/23  08:22 EDT    Dictated utilizing Dragon dictation.      meds at [facility]

## 2023-08-16 NOTE — THERAPY TREATMENT NOTE
Patient Name: Gabi Dudley  : 1945    MRN: [id number]                              Today's Date: 2023       Admit Date: 2023    Visit Dx:     ICD-10-CM ICD-9-CM   1. Partial small bowel obstruction  K56.600 560.9   2. Atrial fibrillation with rapid ventricular response  I48.91 427.31     Patient Active Problem List   Diagnosis    Adrenal adenoma    Anxiety    Chronic fatigue    Fibromyalgia    Hyperlipidemia    Essential hypertension    Insomnia    Osteoarthritis of knee    Osteoporosis    Pulmonary emphysema    Simple renal cyst    Tension headache    Vitamin D deficiency    Diverticulosis    Inversion of nipple    Paroxysmal atrial fibrillation    Coronary artery disease involving native coronary artery of native heart without angina pectoris    Autonomic dysfunction    Gastroesophageal reflux disease without esophagitis    Urge incontinence    Erythrasma    Compression fracture of T5 vertebra    Lung nodule    COPD exacerbation    Overweight (BMI 25.0-29.9)    Acute on chronic respiratory failure with hypoxia    Acute on chronic respiratory failure with hypoxia and hypercapnia    Iron deficiency anemia    Impaired mobility and ADLs    Acute respiratory failure with hypoxia and hypercapnia    Aspiration pneumonia of right lower lobe    Partial small bowel obstruction     Past Medical History:   Diagnosis Date    Adrenal adenoma     Anemia     Arrhythmia     Asthma     Atrial fibrillation     Back pain     Benign colonic polyp     Benign tumor of adrenal gland     Cataract     bilateral    Cholelithiasis     Chronic bronchitis     Chronic bronchitis with COPD (chronic obstructive pulmonary disease)     COPD (chronic obstructive pulmonary disease)     Coronary artery disease     Depression     Diverticulosis Years ago    Elevated cholesterol     Environmental and seasonal allergies     Fibromyalgia     Fibromyalgia, primary Sometime in the s    Gastritis     Generalized anxiety disorder      GERD (gastroesophageal reflux disease)     H/O mammogram     Headache Years ago    Hemorrhoids     History of blood transfusion 1985    History of blood transfusion 1985    History of echocardiogram     History of endometriosis     History of nuclear stress test     Hospitalization or health care facility admission within last 6 months     5 times between 11/2022-05/2023    Hypertension     IBS (irritable bowel syndrome)     Impaired functional mobility, balance, gait, and endurance     Impaired mobility     Inverted nipple     Kidney disease     Kidney stone     Liver cyst     Low back pain Years ago    Nodular radiologic density     Nodule of left lung     On home oxygen therapy     2 liters NC QHS    Osteoarthritis     Osteopenia Several years ago    Osteoporosis     PONV (postoperative nausea and vomiting)     Renal cyst     Sinus problem     2014    Sinusitis     Skin cancer     basal cell carcinoma    SOB (shortness of breath)     Tobacco use     Urinary frequency     Urinary tract infection Years ago    Frequent UTIs    Vitamin D deficiency     Wears glasses     Wears partial dentures     upper plate     Past Surgical History:   Procedure Laterality Date    APPENDECTOMY  1980s    CARDIAC CATHETERIZATION  2003    CATARACT EXTRACTION  2013    both eyes    CHOLECYSTECTOMY  2020    CHOLECYSTECTOMY WITH INTRAOPERATIVE CHOLANGIOGRAM N/A 10/30/2020    Procedure: CHOLECYSTECTOMY LAPAROSCOPIC INTRAOPERATIVE CHOLANGIOGRAPHY;  Surgeon: Sima Moses MD;  Location: [facility] OR;  Service: General;  Laterality: N/A;    COLON SURGERY  2020    COLONOSCOPY  03/11/2013    COLONOSCOPY  06/21/2016    COLONOSCOPY N/A 07/24/2019    Procedure: COLONOSCOPY W/ COLD FORCEP POLYPECTOMIES; HOT SNARE POLYPECTOMIES; COLD SNARE POLYPECTOMY;  Surgeon: Goyo Nunez MD;  Location: [facility] ENDOSCOPY;  Service: Gastroenterology    COLONOSCOPY W/ BIOPSIES AND POLYPECTOMY      EXPLORATORY LAPAROTOMY N/A 11/23/2020    Procedure: colectomy,  right, closure of enterotomy x 2, reduction of internal volvulus;  Surgeon: Sima Moses MD;  Location:  [facility] OR;  Service: General;  Laterality: N/A;    EXPLORATORY LAPAROTOMY N/A 8/9/2023    Procedure: LAPAROTOMY EXPLORATORY WITH LYIS OF ADHESIONS AND  CENTRAL LINE INSERTION;  Surgeon: Bianca Isaac DO;  Location: [facility] OR;  Service: General;  Laterality: N/A;    HYSTERECTOMY  1980s    partial    LYSIS OF ABDOMINAL ADHESIONS      TONSILLECTOMY  1987    UPPER GASTROINTESTINAL ENDOSCOPY  01/13/2015    VAGINAL DELIVERY      x2      General Information       Row Name 08/16/23 1521          Physical Therapy Time and Intention    Document Type therapy note (daily note)  -RM     Mode of Treatment physical therapy  -RM       Row Name 08/16/23 1521          General Information    Patient Profile Reviewed yes  -RM     Existing Precautions/Restrictions fall;oxygen therapy device and L/min  -RM       Row Name 08/16/23 1521          Cognition    Orientation Status (Cognition) oriented to;person;place;situation  -       Row Name 08/16/23 1521          Safety Issues, Functional Mobility    Impairments Affecting Function (Mobility) balance;endurance/activity tolerance;shortness of breath;strength;cognition  -RM               User Key  (r) = Recorded By, (t) = Taken By, (c) = Cosigned By      Initials Name Provider Type    RM Bebeto David, PTA Physical Therapist Assistant                   Mobility       Row Name 08/16/23 1527          Bed Mobility    Supine-Sit [place] (Bed Mobility) minimum assist (75% patient effort)  -RM     Assistive Device (Bed Mobility) bed rails;head of bed elevated  -       Row Name 08/16/23 1527          Sit-Stand Transfer    Sit-Stand [place] (Transfers) contact guard;minimum assist (75% patient effort)  -RM     Assistive Device (Sit-Stand Transfers) walker, front-wheeled  -       Row Name 08/16/23 1527          Gait/Stairs (Locomotion)    [place] Level (Gait)  contact guard;minimum assist (75% patient effort);verbal cues  -RM     Assistive Device (Gait) walker, front-wheeled  -RM     Distance in Feet (Gait) 4'  -RM     Deviations/Abnormal Patterns (Gait) base of support, narrow;stride length decreased;weight shifting decreased  -RM               User Key  (r) = Recorded By, (t) = Taken By, (c) = Cosigned By      Initials Name Provider Type    Bebeto Laird, RAYRAY Physical Therapist Assistant                   Obj/Interventions       Row Name 08/16/23 1529          Motor Skills    Therapeutic Exercise hip;knee;ankle  -RM       Row Name 08/16/23 1529          Hip (Therapeutic Exercise)    Hip (Therapeutic Exercise) strengthening exercise  -RM     Hip Strengthening (Therapeutic Exercise) bilateral;marching while seated;10 repetitions;2 sets  -RM       Row Name 08/16/23 1529          Knee (Therapeutic Exercise)    Knee (Therapeutic Exercise) strengthening exercise  -RM     Knee Strengthening (Therapeutic Exercise) bilateral;LAQ (long arc quad);10 repetitions  -RM       Row Name 08/16/23 1529          Ankle (Therapeutic Exercise)    Ankle (Therapeutic Exercise) AROM (active range of motion)  -RM     Ankle AROM (Therapeutic Exercise) bilateral;dorsiflexion;plantarflexion;10 repetitions  -RM               User Key  (r) = Recorded By, (t) = Taken By, (c) = Cosigned By      Initials Name Provider Type    Bebeto Laird, RAYRAY Physical Therapist Assistant                   Goals/Plan    No documentation.                  Clinical Impression       Row Name 08/16/23 1530          Pain    Pretreatment Pain Rating 8/10  -RM     Posttreatment Pain Rating 8/10  -RM     Pain Location generalized  -RM       Row Name 08/16/23 1530          Plan of Care Review    Plan of Care Reviewed With patient  -RM     Progress improving  -RM     Outcome Evaluation Pt supine in bed and willing to participate with treatment.  Pt transfered to EOB with min a.  Pt performed sit to stand with cga  with rw and ambulated 4' cgamin a with rw.  It was noted that pt was initially 97% with 3L sitting EOB dropped to 77% and was titrated up to 4L and rebounded to 91% sitting EOB. Pt transfering from bed to chair dropped to 88% and once in chair was able to maintain 90-92%.  See flowsheet for details  -RM       Row Name 08/16/23 1530          Vital Signs    Pre SpO2 (%) 97  -RM     O2 Delivery Pre Treatment supplemental O2  3  -RM     Intra SpO2 (%) 77  -RM     O2 Delivery Intra Treatment supplemental O2  3  -RM     Post SpO2 (%) 92  -RM     O2 Delivery Post Treatment supplemental O2  4  -RM     Pre Patient Position Supine  -RM     Intra Patient Position Standing  -RM     Post Patient Position Sitting  -RM       Row Name 08/16/23 1530          Positioning and Restraints    Pre-Treatment Position in bed  -RM     Post Treatment Position chair  -RM     In Chair sitting;call light within reach;encouraged to call for assist;notified nsg  -RM               User Key  (r) = Recorded By, (t) = Taken By, (c) = Cosigned By      Initials Name Provider Type    RM Bebeto David, PTA Physical Therapist Assistant                   Outcome Measures       Row Name 08/16/23 1535 08/16/23 0900       How much help from another person do you currently need...    Turning from your back to your side while in flat bed without using bedrails? 3  -RM 3  -AM    Moving from lying on back to sitting on the side of a flat bed without bedrails? 3  -RM 3  -AM    Moving to and from a bed to a chair (including a wheelchair)? 3  -RM 2  -AM    Standing up from a chair using your arms (e.g., wheelchair, bedside chair)? 3  -RM 3  -AM    Climbing 3-5 steps with a railing? 1  -RM 2  -AM    To walk in hospital room? 3  -RM 2  -AM    AM-PAC 6 Clicks Score (PT) 16  -RM 15  -AM    Highest level of mobility 5 --> Static standing  -RM 4 --> Transferred to chair/commode  -AM      Row Name 08/16/23 1535 08/16/23 1503       Functional Assessment    Outcome  Measure Options AM-PAC 6 Clicks Basic Mobility (PT)  - AM-PAC 6 Clicks Daily Activity (OT)  -SD              User Key  (r) = Recorded By, (t) = Taken By, (c) = Cosigned By      Initials Name Provider Type     Bebeto David, PTA Physical Therapist Assistant    Aimee Bolanos, OT Occupational Therapist    Gio Baker, RN Registered Nurse                                 Physical Therapy Education       Title: PT OT SLP Therapies (In Progress)       Topic: Physical Therapy (In Progress)       Point: Mobility training (Done)       Learning Progress Summary             Patient Acceptance, E,TB,D, VU,NR by  at 8/16/2023 1535    Comment: Pursed lip breathing   pacing   exercise techniques    Acceptance, E,TB,D, VU,NR by  at 8/15/2023 1603    Comment: Importance of activity    Acceptance, E, NR by AM at 8/13/2023 0452    Acceptance, E, NR,NL by NL at 8/10/2023 1106    Comment: Patient motivated to participate in movement due to its benefits.                         Point: Home exercise program (Done)       Learning Progress Summary             Patient Acceptance, E,TB,D, VU,NR by  at 8/16/2023 1535    Comment: Pursed lip breathing   pacing   exercise techniques                         Point: Body mechanics (Not Started)       Learner Progress:  Not documented in this visit.              Point: Precautions (Not Started)       Learner Progress:  Not documented in this visit.                              User Key       Initials Effective Dates Name Provider Type Discipline     06/16/21 -  Bebeto David, PTA Physical Therapist Assistant PT    AM 02/22/23 -  Gio Wang, RN Registered Nurse Nurse    NL 07/13/23 -  Carlo Gruber PT Student PT Student PT                  PT Recommendation and Plan     Plan of Care Reviewed With: patient  Progress: improving  Outcome Evaluation: Pt supine in bed and willing to participate with treatment.  Pt transfered to EOB with min a.  Pt performed sit to stand with cga  with rw and ambulated 4' cgamin a with rw.  It was noted that pt was initially 97% with 3L sitting EOB dropped to 77% and was titrated up to 4L and rebounded to 91% sitting EOB. Pt transfering from bed to chair dropped to 88% and once in chair was able to maintain 90-92%.  See flowsheet for details     Time Calculation:         PT Charges       Row Name 08/16/23 1536             Time Calculation    Start Time 1328  -RM      Stop Time 1407  -RM      Time Calculation (min) 39 min  -RM      PT Received On 08/16/23  -RM      PT Goal Re-Cert Due Date 08/20/23  -RM         Time Calculation- PT    Total Timed Code Minutes- PT 39 minute(s)  -RM         Timed Charges    03287 - PT Therapeutic Exercise Minutes 14  -RM      47976 - Gait Training Minutes  10  -RM      38737 - PT Therapeutic Activity Minutes 15  -RM         Total Minutes    Timed Charges Total Minutes 39  -RM       Total Minutes 39  -RM                User Key  (r) = Recorded By, (t) = Taken By, (c) = Cosigned By      Initials Name Provider Type    Bebeto Laird, PTA Physical Therapist Assistant                  Therapy Charges for Today       Code Description Service Date Service Provider Modifiers Qty    50827477628 HC PT THERAPEUTIC ACT EA 15 MIN 8/15/2023 Bebeto David, PTA GP 1    95475294925 HC PT THER PROC EA 15 MIN 8/16/2023 Bebeto David, PTA GP 1    04571185097 HC GAIT TRAINING EA 15 MIN 8/16/2023 Bebeto David, PTA GP 1    53438338764 HC PT THERAPEUTIC ACT EA 15 MIN 8/16/2023 Bebeto David, PTA GP 1            PT G-Codes  Outcome Measure Options: AM-PAC 6 Clicks Basic Mobility (PT)  AM-PAC 6 Clicks Score (PT): 16  AM-PAC 6 Clicks Score (OT): 15       Bebeto David PTA  8/16/2023

## 2023-08-16 NOTE — THERAPY RE-EVALUATION
Acute Care - Speech Language Pathology   Swallow Re-Evaluation  Ted     Patient Name: Gabi Dudley  : 1945  MRN: [id number]  Today's Date: 2023               Admit Date: 2023    Visit Dx:     ICD-10-CM ICD-9-CM   1. Partial small bowel obstruction  K56.600 560.9   2. Atrial fibrillation with rapid ventricular response  I48.91 427.31     Patient Active Problem List   Diagnosis    Adrenal adenoma    Anxiety    Chronic fatigue    Fibromyalgia    Hyperlipidemia    Essential hypertension    Insomnia    Osteoarthritis of knee    Osteoporosis    Pulmonary emphysema    Simple renal cyst    Tension headache    Vitamin D deficiency    Diverticulosis    Inversion of nipple    Paroxysmal atrial fibrillation    Coronary artery disease involving native coronary artery of native heart without angina pectoris    Autonomic dysfunction    Gastroesophageal reflux disease without esophagitis    Urge incontinence    Erythrasma    Compression fracture of T5 vertebra    Lung nodule    COPD exacerbation    Overweight (BMI 25.0-29.9)    Acute on chronic respiratory failure with hypoxia    Acute on chronic respiratory failure with hypoxia and hypercapnia    Iron deficiency anemia    Impaired mobility and ADLs    Acute respiratory failure with hypoxia and hypercapnia    Aspiration pneumonia of right lower lobe    Partial small bowel obstruction     Past Medical History:   Diagnosis Date    Adrenal adenoma     Anemia     Arrhythmia     Asthma     Atrial fibrillation     Back pain     Benign colonic polyp     Benign tumor of adrenal gland     Cataract     bilateral    Cholelithiasis     Chronic bronchitis     Chronic bronchitis with COPD (chronic obstructive pulmonary disease)     COPD (chronic obstructive pulmonary disease)     Coronary artery disease     Depression     Diverticulosis Years ago    Elevated cholesterol     Environmental and seasonal allergies     Fibromyalgia     Fibromyalgia, primary Sometime in  the 90s    Gastritis     Generalized anxiety disorder     GERD (gastroesophageal reflux disease)     H/O mammogram     Headache Years ago    Hemorrhoids     History of blood transfusion 1985    History of blood transfusion 1985    History of echocardiogram     History of endometriosis     History of nuclear stress test     Hospitalization or health care facility admission within last 6 months     5 times between 11/2022-05/2023    Hypertension     IBS (irritable bowel syndrome)     Impaired functional mobility, balance, gait, and endurance     Impaired mobility     Inverted nipple     Kidney disease     Kidney stone     Liver cyst     Low back pain Years ago    Nodular radiologic density     Nodule of left lung     On home oxygen therapy     2 liters NC QHS    Osteoarthritis     Osteopenia Several years ago    Osteoporosis     PONV (postoperative nausea and vomiting)     Renal cyst     Sinus problem     2014    Sinusitis     Skin cancer     basal cell carcinoma    SOB (shortness of breath)     Tobacco use     Urinary frequency     Urinary tract infection Years ago    Frequent UTIs    Vitamin D deficiency     Wears glasses     Wears partial dentures     upper plate     Past Surgical History:   Procedure Laterality Date    APPENDECTOMY  1980s    CARDIAC CATHETERIZATION  2003    CATARACT EXTRACTION  2013    both eyes    CHOLECYSTECTOMY  2020    CHOLECYSTECTOMY WITH INTRAOPERATIVE CHOLANGIOGRAM N/A 10/30/2020    Procedure: CHOLECYSTECTOMY LAPAROSCOPIC INTRAOPERATIVE CHOLANGIOGRAPHY;  Surgeon: Sima Moses MD;  Location: [facility] OR;  Service: General;  Laterality: N/A;    COLON SURGERY  2020    COLONOSCOPY  03/11/2013    COLONOSCOPY  06/21/2016    COLONOSCOPY N/A 07/24/2019    Procedure: COLONOSCOPY W/ COLD FORCEP POLYPECTOMIES; HOT SNARE POLYPECTOMIES; COLD SNARE POLYPECTOMY;  Surgeon: Goyo Nunez MD;  Location: [facility] ENDOSCOPY;  Service: Gastroenterology    COLONOSCOPY W/ BIOPSIES AND POLYPECTOMY       EXPLORATORY LAPAROTOMY N/A 11/23/2020    Procedure: colectomy, right, closure of enterotomy x 2, reduction of internal volvulus;  Surgeon: Sima Moses MD;  Location: [facility] OR;  Service: General;  Laterality: N/A;    EXPLORATORY LAPAROTOMY N/A 8/9/2023    Procedure: LAPAROTOMY EXPLORATORY WITH LYIS OF ADHESIONS AND  CENTRAL LINE INSERTION;  Surgeon: Bianca Isaac DO;  Location: [facility] OR;  Service: General;  Laterality: N/A;    HYSTERECTOMY  1980s    partial    LYSIS OF ABDOMINAL ADHESIONS      TONSILLECTOMY  1987    UPPER GASTROINTESTINAL ENDOSCOPY  01/13/2015    VAGINAL DELIVERY      x2       SLP Recommendation and Plan  SLP Swallowing Diagnosis: functional oral phase, suspected pharyngeal dysphagia, esophageal dysphagia (08/16/23 1239)  SLP Diet Recommendation: soft to chew textures, thin liquids, no mixed consistencies (08/16/23 1239)  Recommended Precautions and Strategies: upright posture during/after eating, general aspiration precautions, reflux precautions (08/16/23 1239)  SLP Rec. for Method of Medication Administration: meds whole, with puree, as tolerated, with thin liquids (08/16/23 1239)     Monitor for Signs of Aspiration: yes, notify SLP if any concerns, cough, gurgly voice, throat clearing, right lower lobe infiltrates, pneumonia (08/16/23 1239)  Recommended Diagnostics: other (see comments) (follow for diet akhil) (08/16/23 1239)  Swallow Criteria for Skilled Therapeutic Interventions Met:  (will follow for diet akhil) (08/16/23 1239)  Anticipated Discharge Disposition (SLP): unknown (08/16/23 1239)     Therapy Frequency (Swallow): PRN (08/16/23 1239)     Oral Care Recommendations: Denture Care, Swab, Toothbrush (08/16/23 1239)                                      Oral Care Recommendations: Denture Care, Swab, Toothbrush (08/16/23 1239)    Outcome Evaluation: Re-eval of pt.'s swallowing for diet upgrade completed. Pt. was given trials of regular solid and thin liquids. No overt s/s aspiration  or other difficulty with trials, but pt. continues to have some generalized weakness that may affect coordination and safety at times and delayed initiation of pharyngeal swallow per palpation. Recommend: 1. soft to chew diet with thin liq as akhil, 2.meds whole with thin liq or pudding/applesauce as akhil, 3. no mixed consistencies, 4. aspiration precautions, 5. reflux precautions. Will follow for diet akhil.      SWALLOW EVALUATION (last 72 hours)       SLP Adult Swallow Evaluation       Row Name 08/16/23 1239 08/15/23 1232                Rehab Evaluation    Document Type re-evaluation  -TM evaluation  -TM       Subjective Information no complaints  -TM no complaints  -TM       Patient Observations alert;cooperative  -TM cooperative  drowsy and confused  -TM       Patient/Family/Caregiver Comments/Observations no family present  -TM no family present  -TM       Patient Effort good  -TM adequate  -TM          General Information    Patient Profile Reviewed yes  -TM yes  -TM       Pertinent History Of Current Problem -- bowel obstruction, GERD, pneu hx  -TM       Current Method of Nutrition pureed;nectar/syrup-thick liquids;water between meals  -TM NPO  -TM       Precautions/Limitations, Vision WFL with corrective lenses  -TM WFL with corrective lenses  -TM       Precautions/Limitations, Hearing WFL;for purposes of eval  -TM WFL;for purposes of eval  -TM       Prior Level of Function-Communication WFL  -TM unknown  -TM       Prior Level of Function-Swallowing unknown;esophageal concerns;other (see comments)  dx of GERD  -TM unknown  -TM       Plans/Goals Discussed with patient;other (see comments)  RN  -TM patient;other (see comments)  RN  -TM       Barriers to Rehab medically complex  -TM medically complex  -TM       Patient's Goals for Discharge patient did not state  -TM patient did not state  -TM          Pain    Additional Documentation Pain Scale: Numbers Pre/Post-Treatment (Group)  -TM Pain Scale: Word  "Pre/Post-Treatment (Group)  -TM          Pain Scale: Numbers Pre/Post-Treatment    Pretreatment Pain Rating 0/10 - no pain  -TM --       Posttreatment Pain Rating 0/10 - no pain  -TM --       Pain Intervention(s) -- Repositioned  -TM          Pain Scale: FACES Pre/Post-Treatment    Pain: FACES Scale, Pretreatment -- 4-->hurts little more  -TM       Posttreatment Pain Rating -- 4-->hurts little more  -TM       Pain Location - -- other (see comments)  \"all over\"  -TM          Oral Motor Structure and Function    Oral Lesions or Structural Abnormalities and/or variants -- none identified  -TM       Dentition Assessment -- other (see comments);natural, present and adequate  no upper dentition; lower natural teeth only  -TM       Secretion Management WNL/WFL  -TM WNL/WFL  -TM       Volitional Cough -- WFL  -TM          Oral Musculature and Cranial Nerve Assessment    Oral Motor General Assessment generalized oral motor weakness  -TM generalized oral motor weakness  -TM          General Eating/Swallowing Observations    Respiratory Support Currently in Use nasal cannula  -TM nasal cannula  -TM       O2 Liters 3L  -TM 2L  -TM       Eating/Swallowing Skills -- fed by SLP  -TM       Positioning During Eating -- upright in bed  -TM       Utensils Used -- spoon;straw  -TM       Consistencies Trialed -- pureed;nectar/syrup-thick liquids;thin liquids  -TM       Pre SpO2 (%) -- 99  -TM       Post SpO2 (%) -- 99  -TM          Respiratory    Respiratory Status WFL;during swallowing/eating  -TM WFL;during swallowing/eating  -TM          Clinical Swallow Eval    Oral Prep Phase WFL  -TM WFL  with trials presented  -TM       Oral Transit WFL  -TM WFL  -TM       Oral Residue WFL  -TM WFL  -TM       Pharyngeal Phase no overt signs/symptoms of pharyngeal impairment  -TM suspected pharyngeal impairment  -TM       Esophageal Phase suspected esophageal impairment  GERD  -TM suspected esophageal impairment  dx of GERD  -TM       Clinical " Swallow Evaluation Summary Re-eval of pt.'s swallowing for diet upgrade completed.  Pt. was given trials of regular solid and thin liquids.  No overt s/s aspiration or other difficulty with trials, but pt. continues to have some generalized weakness that may affect coordination and safety at times and delayed initiation of pharyngeal swallow per palpation.  Recommend:  1. soft to chew diet with thin liq as akhil, 2.meds whole with thin liq or pudding/applesauce as akhil, 3. no mixed consistencies, 4. aspiration precautions, 5. reflux precautions.  Will follow for diet akhil.  -TM Bedside eval of swallow completed with pt. seated upright in bed for po trials.  She was drowsy and confused but participated adequate with encouragement and intermittent prompting.  Oral mech was remarkable for generalized weakness and no upper dentition.  She was given trials of puree, nectar-thick, and thin liquids.  Oral phase was adequate with trials presented without dysphagia.  Her drowsiness, weakness and confusion at present, however, may affect safety with some solid foods.  No overt s/s aspiration with trials presented, but pt. had decreased laryngeal elevation per palpation which may be due to overall weakness and may affect safety with liquids at times.  Recommend:  1. initiate pureed diet with nectar-thick liq with meals;  H20 between meals only and not with any other po, 2. meds crushed in pudding/applesaue, 3. aspiration precautions, 4. reflux precautions.  SLP to f/u tomorrow to reassess for potential in diet upgrade to  include some solids if pt. shows sufficient improvement for safety.  D/W RN following eval.  -TM          Pharyngeal Phase Concerns    Pharyngeal Phase Concerns other (see comments)  delay per palpation  -TM other (see comments)  decreased laryngeal elevation per palpation  -TM          Esophageal Phase Concerns    Esophageal Phase Concerns other (see comments)  GERD  -TM other (see comments)  dx of GERD  -TM           SLP Evaluation Clinical Impression    SLP Swallowing Diagnosis functional oral phase;suspected pharyngeal dysphagia;esophageal dysphagia  -TM functional oral phase;suspected pharyngeal dysphagia;esophageal dysphagia  -TM       Functional Impact risk of aspiration/pneumonia  -TM risk of aspiration/pneumonia  -TM       Swallow Criteria for Skilled Therapeutic Interventions Met --  will follow for diet akhil  -TM --          SLP Treatment Clinical Impressions    Barriers to Overall Progress (SLP) -- Medically complex  -TM          Recommendations    Therapy Frequency (Swallow) PRN  -TM PRN  -TM       SLP Diet Recommendation soft to chew textures;thin liquids;no mixed consistencies  -TM puree;nectar thick liquids;water between meals after oral care, with supervision  -TM       Recommended Diagnostics other (see comments)  follow for diet akhil  -TM reassess via clinical swallow evaluation  -TM       Recommended Precautions and Strategies upright posture during/after eating;general aspiration precautions;reflux precautions  -TM upright posture during/after eating;general aspiration precautions;reflux precautions  -TM       Oral Care Recommendations Denture Care;Swab;Toothbrush  -TM Denture Care;Swab;Toothbrush  -TM       SLP Rec. for Method of Medication Administration meds whole;with puree;as tolerated;with thin liquids  -TM meds whole;meds crushed;with puree;as tolerated  -TM       Monitor for Signs of Aspiration yes;notify SLP if any concerns;cough;gurgly voice;throat clearing;right lower lobe infiltrates;pneumonia  -TM yes;notify SLP if any concerns;cough;gurgly voice;throat clearing;right lower lobe infiltrates;pneumonia  -TM       Anticipated Discharge Disposition (SLP) unknown  -TM unknown  -TM                 User Key  (r) = Recorded By, (t) = Taken By, (c) = Cosigned By      Initials Name Effective Dates    Ashlyn Echeverria 06/16/21 -                     EDUCATION  The patient has been educated in the  following areas:   Dysphagia (Swallowing Impairment) Oral Care/Hydration Modified Diet Instruction.              Time Calculation:    Time Calculation- SLP       Row Name 08/16/23 1349             Time Calculation- SLP    SLP Start Time 1239  -TM      SLP Received On 08/16/23  -TM         Untimed Charges    [id number] Treatment Swallow Minutes 10  -TM         Total Minutes    Untimed Charges Total Minutes 10  -TM       Total Minutes 10  -TM                User Key  (r) = Recorded By, (t) = Taken By, (c) = Cosigned By      Initials Name Provider Type    TM Ashlyn Mendiola Speech and Language Pathologist                    Therapy Charges for Today       Code Description Service Date Service Provider Modifiers Qty    [id number]  ST EVAL ORAL PHARYNG SWALLOW 4 8/15/2023 Ashlyn Mendiola GN 1    [id number]  ST TREATMENT SWALLOW 1 8/16/2023 Ashlyn Mendiola 1                 Ashlyn Mendiola  8/16/2023

## 2023-08-16 NOTE — THERAPY TREATMENT NOTE
Patient Name: Gabi Dudley  : 1945    MRN: [id number]                              Today's Date: 2023       Admit Date: 2023    Visit Dx:     ICD-10-CM ICD-9-CM   1. Partial small bowel obstruction  K56.600 560.9   2. Atrial fibrillation with rapid ventricular response  I48.91 427.31     Patient Active Problem List   Diagnosis    Adrenal adenoma    Anxiety    Chronic fatigue    Fibromyalgia    Hyperlipidemia    Essential hypertension    Insomnia    Osteoarthritis of knee    Osteoporosis    Pulmonary emphysema    Simple renal cyst    Tension headache    Vitamin D deficiency    Diverticulosis    Inversion of nipple    Paroxysmal atrial fibrillation    Coronary artery disease involving native coronary artery of native heart without angina pectoris    Autonomic dysfunction    Gastroesophageal reflux disease without esophagitis    Urge incontinence    Erythrasma    Compression fracture of T5 vertebra    Lung nodule    COPD exacerbation    Overweight (BMI 25.0-29.9)    Acute on chronic respiratory failure with hypoxia    Acute on chronic respiratory failure with hypoxia and hypercapnia    Iron deficiency anemia    Impaired mobility and ADLs    Acute respiratory failure with hypoxia and hypercapnia    Aspiration pneumonia of right lower lobe    Partial small bowel obstruction     Past Medical History:   Diagnosis Date    Adrenal adenoma     Anemia     Arrhythmia     Asthma     Atrial fibrillation     Back pain     Benign colonic polyp     Benign tumor of adrenal gland     Cataract     bilateral    Cholelithiasis     Chronic bronchitis     Chronic bronchitis with COPD (chronic obstructive pulmonary disease)     COPD (chronic obstructive pulmonary disease)     Coronary artery disease     Depression     Diverticulosis Years ago    Elevated cholesterol     Environmental and seasonal allergies     Fibromyalgia     Fibromyalgia, primary Sometime in the s    Gastritis     Generalized anxiety disorder      GERD (gastroesophageal reflux disease)     H/O mammogram     Headache Years ago    Hemorrhoids     History of blood transfusion 1985    History of blood transfusion 1985    History of echocardiogram     History of endometriosis     History of nuclear stress test     Hospitalization or health care facility admission within last 6 months     5 times between 11/2022-05/2023    Hypertension     IBS (irritable bowel syndrome)     Impaired functional mobility, balance, gait, and endurance     Impaired mobility     Inverted nipple     Kidney disease     Kidney stone     Liver cyst     Low back pain Years ago    Nodular radiologic density     Nodule of left lung     On home oxygen therapy     2 liters NC QHS    Osteoarthritis     Osteopenia Several years ago    Osteoporosis     PONV (postoperative nausea and vomiting)     Renal cyst     Sinus problem     2014    Sinusitis     Skin cancer     basal cell carcinoma    SOB (shortness of breath)     Tobacco use     Urinary frequency     Urinary tract infection Years ago    Frequent UTIs    Vitamin D deficiency     Wears glasses     Wears partial dentures     upper plate     Past Surgical History:   Procedure Laterality Date    APPENDECTOMY  1980s    CARDIAC CATHETERIZATION  2003    CATARACT EXTRACTION  2013    both eyes    CHOLECYSTECTOMY  2020    CHOLECYSTECTOMY WITH INTRAOPERATIVE CHOLANGIOGRAM N/A 10/30/2020    Procedure: CHOLECYSTECTOMY LAPAROSCOPIC INTRAOPERATIVE CHOLANGIOGRAPHY;  Surgeon: Sima Moses MD;  Location: [facility];  Service: General;  Laterality: N/A;    COLON SURGERY  2020    COLONOSCOPY  03/11/2013    COLONOSCOPY  06/21/2016    COLONOSCOPY N/A 07/24/2019    Procedure: COLONOSCOPY W/ COLD FORCEP POLYPECTOMIES; HOT SNARE POLYPECTOMIES; COLD SNARE POLYPECTOMY;  Surgeon: Goyo Nunez MD;  Location: [facility];  Service: Gastroenterology    COLONOSCOPY W/ BIOPSIES AND POLYPECTOMY      EXPLORATORY LAPAROTOMY N/A 11/23/2020    Procedure: colectomy,  right, closure of enterotomy x 2, reduction of internal volvulus;  Surgeon: Sima Moses MD;  Location:  [facility];  Service: General;  Laterality: N/A;    EXPLORATORY LAPAROTOMY N/A 8/9/2023    Procedure: LAPAROTOMY EXPLORATORY WITH LYIS OF ADHESIONS AND  CENTRAL LINE INSERTION;  Surgeon: Bianca Isaac DO;  Location:  [facility];  Service: General;  Laterality: N/A;    HYSTERECTOMY  1980s    partial    LYSIS OF ABDOMINAL ADHESIONS      TONSILLECTOMY  1987    UPPER GASTROINTESTINAL ENDOSCOPY  01/13/2015    VAGINAL DELIVERY      x2      General Information       Row Name 08/16/23 1455          OT Time and Intention    Document Type therapy note (daily note)  -SD     Mode of Treatment occupational therapy  -SD       Row Name 08/16/23 1455          General Information    Patient Profile Reviewed yes  -SD               User Key  (r) = Recorded By, (t) = Taken By, (c) = Cosigned By      Initials Name Provider Type    SD Aimee Allan OT Occupational Therapist                     Mobility/ADL's       Row Name 08/16/23 1456          Bed Mobility    Supine-Sit [place] (Bed Mobility) minimum assist (75% patient effort)  -SD     Assistive Device (Bed Mobility) bed rails;head of bed elevated  -SD       Row Name 08/16/23 1456          Transfers    Transfers sit-stand transfer;bed-chair transfer  -SD       Row Name 08/16/23 1456          Bed-Chair Transfer    Bed-Chair [place] (Transfers) contact guard;minimum assist (75% patient effort)  -SD     Assistive Device (Bed-Chair Transfers) walker, front-wheeled  -SD       Row Name 08/16/23 1456          Sit-Stand Transfer    Sit-Stand [place] (Transfers) contact guard;minimum assist (75% patient effort)  -SD     Assistive Device (Sit-Stand Transfers) walker, front-wheeled  -SD       Row Name 08/16/23 1456          Bathing Assessment/Intervention    [place] Level (Bathing) upper body;upper extremities;minimum assist (75% patient effort)  -SD       Row  Name 08/16/23 1456          Upper Body Dressing Assessment/Training    [place] Level (Upper Body Dressing) don;minimum assist (75% patient effort)  -SD     Position (Upper Body Dressing) edge of bed sitting  -SD       Row Name 08/16/23 1456          Lower Body Dressing Assessment/Training    [place] Level (Lower Body Dressing) don;pants/bottoms;socks;moderate assist (50% patient effort)  -SD     Position (Lower Body Dressing) edge of bed sitting;supported standing  -SD       Row Name 08/16/23 1456          Grooming Assessment/Training    [place] Level (Grooming) hair care, combing/brushing;wash face, hands;standby assist  -SD     Position (Grooming) edge of bed sitting  -SD               User Key  (r) = Recorded By, (t) = Taken By, (c) = Cosigned By      Initials Name Provider Type    Aimee Bolanos OT Occupational Therapist                   Obj/Interventions    No documentation.                  Goals/Plan    No documentation.                  Clinical Impression       Row Name 08/16/23 1458          Pain Assessment    Pretreatment Pain Rating 8/10  -SD     Posttreatment Pain Rating 8/10  -SD     Pain Location generalized  -SD       Row Name 08/16/23 1458          Plan of Care Review    Plan of Care Reviewed With patient  -SD     Progress improving  -SD     Outcome Evaluation OT tx completed. Patient supine in bed, c/o 8/10 generalized pain. Patient is on 3L O2 satting 93%. Patient moved to EOB with min A, sats dropped to 77%, increased to 4L and with deep breathing exercises returned to 90. Patient performed UBB and UBD with min A, grooming tasks with SBA, required mod A to ofe socks and pull up. Patient performed sit to stand and tf to chair with CGA-Min A. Patient performed BUE AROM x 10, left sitting in chair with PT present. Continue OT POC  -SD       Row Name 08/16/23 1458          Vital Signs    Pre SpO2 (%) 93  -SD     O2 Delivery Pre Treatment supplemental O2  3L  -SD     Intra SpO2  (%) 77  -SD     O2 Delivery Intra Treatment supplemental O2  4L  -SD     Post SpO2 (%) 92  -SD     O2 Delivery Post Treatment supplemental O2  92  -SD       Row Name 08/16/23 1458          Positioning and Restraints    Pre-Treatment Position in bed  -SD     Post Treatment Position chair  -SD     In Chair sitting;call light within reach;encouraged to call for assist;with PT  -SD               User Key  (r) = Recorded By, (t) = Taken By, (c) = Cosigned By      Initials Name Provider Type    Aimee Bolanos OT Occupational Therapist                   Outcome Measures       Row Name 08/16/23 1503          How much help from another is currently needed...    Putting on and taking off regular lower body clothing? 2  -SD     Bathing (including washing, rinsing, and drying) 2  -SD     Toileting (which includes using toilet bed pan or urinal) 2  -SD     Putting on and taking off regular upper body clothing 3  -SD     Taking care of personal grooming (such as brushing teeth) 3  -SD     Eating meals 3  -SD     AM-PAC 6 Clicks Score (OT) 15  -SD       Row Name 08/16/23 0900          How much help from another person do you currently need...    Turning from your back to your side while in flat bed without using bedrails? 3  -AM     Moving from lying on back to sitting on the side of a flat bed without bedrails? 3  -AM     Moving to and from a bed to a chair (including a wheelchair)? 2  -AM     Standing up from a chair using your arms (e.g., wheelchair, bedside chair)? 3  -AM     Climbing 3-5 steps with a railing? 2  -AM     To walk in hospital room? 2  -AM     AM-PAC 6 Clicks Score (PT) 15  -AM     Highest level of mobility 4 --> Transferred to chair/commode  -AM       Row Name 08/16/23 1503          Functional Assessment    Outcome Measure Options AM-PAC 6 Clicks Daily Activity (OT)  -SD               User Key  (r) = Recorded By, (t) = Taken By, (c) = Cosigned By      Initials Name Provider Type    Aimee Bolanos OT  Occupational Therapist    Gio Baker, EFRAÍN Registered Nurse                    Occupational Therapy Education       Title: PT OT SLP Therapies (In Progress)       Topic: Occupational Therapy (In Progress)       Point: ADL training (Done)       Description:   Instruct learner(s) on proper safety adaptation and remediation techniques during self care or transfers.   Instruct in proper use of assistive devices.                  Learning Progress Summary             Patient Acceptance, E,TB, VU by SD at 8/16/2023 1503    Comment: Safety and sequencing during tf    Acceptance, E, DU by SP at 8/15/2023 1458    Comment: Pt educated on value of OT services and continued participation in therapy. Pt agreeable and participated.    Acceptance, E, NR by AM at 8/13/2023 0452    Nonacceptance, E, NL,NR by SD at 8/10/2023 1412    Comment: OT POC                         Point: Home exercise program (Not Started)       Description:   Instruct learner(s) on appropriate technique for monitoring, assisting and/or progressing therapeutic exercises/activities.                  Learner Progress:  Not documented in this visit.              Point: Precautions (Not Started)       Description:   Instruct learner(s) on prescribed precautions during self-care and functional transfers.                  Learner Progress:  Not documented in this visit.              Point: Body mechanics (Not Started)       Description:   Instruct learner(s) on proper positioning and spine alignment during self-care, functional mobility activities and/or exercises.                  Learner Progress:  Not documented in this visit.                              User Key       Initials Effective Dates Name Provider Type Discipline    SD 06/16/21 -  Aimee Allan OT Occupational Therapist OT    SP 09/08/22 -  Zandra Cox OT Occupational Therapist OT    TAMMI 02/22/23 -  Gio Wang, RN Registered Nurse Nurse                  OT Recommendation and Plan  Planned  Therapy Interventions (OT): activity tolerance training, adaptive equipment training, BADL retraining, patient/caregiver education/training, ROM/therapeutic exercise, strengthening exercise, transfer/mobility retraining  Therapy Frequency (OT): 3 times/wk (5 times if indicated)  Plan of Care Review  Plan of Care Reviewed With: patient  Progress: improving  Outcome Evaluation: OT tx completed. Patient supine in bed, c/o 8/10 generalized pain. Patient is on 3L O2 satting 93%. Patient moved to EOB with min A, sats dropped to 77%, increased to 4L and with deep breathing exercises returned to 90. Patient performed UBB and UBD with min A, grooming tasks with SBA, required mod A to ofe socks and pull up. Patient performed sit to stand and tf to chair with CGA-Min A. Patient performed BUE AROM x 10, left sitting in chair with PT present. Continue OT POC     Time Calculation:   Evaluation Complexity (OT)  Review Occupational Profile/Medical/Therapy History Complexity: expanded/moderate complexity  Assessment, Occupational Performance/Identification of Deficit Complexity: 3-5 performance deficits  Clinical Decision Making Complexity (OT): detailed assessment/moderate complexity  Overall Complexity of Evaluation (OT): moderate complexity     Time Calculation- OT       Row Name 08/16/23 1503             Time Calculation- OT    OT Start Time 1331  -SD      OT Stop Time 1403  -SD      OT Time Calculation (min) 32 min  -SD      OT Received On 08/16/23  -SD      OT Goal Re-Cert Due Date 08/23/23  -SD         Timed Charges    95544 - OT Therapeutic Exercise Minutes 10  -SD      62967 - OT Self Care/Mgmt Minutes 15  -SD         Total Minutes    Timed Charges Total Minutes 25  -SD       Total Minutes 25  -SD                User Key  (r) = Recorded By, (t) = Taken By, (c) = Cosigned By      Initials Name Provider Type    Aimee Bolanos, OT Occupational Therapist                  Therapy Charges for Today       Code Description  Service Date Service Provider Modifiers Qty    09832255569 HC OT THER PROC EA 15 MIN 8/16/2023 Aimee Allan OT GO 1    72026647253 HC OT SELF CARE/MGMT/TRAIN EA 15 MIN 8/16/2023 Aimee Allan OT GO 1                 Aimee Allan OT  8/16/2023

## 2023-08-16 NOTE — NURSING NOTE
Pt started becoming confused and anxious approximately 4 am.  Began seeing people in room that were not there.  Pt pulling at abdominal dressing and trying to get out of bed. Pt was straightened up in bed and Geodon was given.   Abd dressing changed.  Pt is currently awaked and still somewhat confused.      Pt has refused her bipap adamantly tonight.

## 2023-08-16 NOTE — PROGRESS NOTES
LOS: 7 days   Patient Care Team:  Paul Quinteros MD as PCP - General (Internal Medicine)  Sima Moses MD as Consulting Physician (General Surgery)  Taiwo Curry MD as Consulting Physician (Cardiology)  Soledad Stauffer, RN as Ambulatory  ([facility])       Chief Complaint: POD7 exploratory laparotomy, adhesiolysis, reduction of small bowel volvulus    HPI: Patient seen and examined in the chair in ICU. She is doing quite well, working with PT. Abdominal pain is minimal. She continues to have BMs and pass flatus. Mental status at baseline today, alert and answering questions appropriately. Tolerating her diet and denies nausea or vomiting.     Vital Signs  Temp:  [97.5 øF (36.4 øC)-98.7 øF (37.1 øC)] 98 øF (36.7 øC)  Heart Rate:  [] 73  Resp:  [18-30] 18  BP: ()/(50-96) 108/59    Physical Exam:     General Appearance:  Alert and cooperative, not in any acute distress.   Respiratory/Lungs:   Breath sounds heard bilaterally equally.  No crackles or wheezing. No Pleural rub or bronchial breathing. Normal respiratory effort.    Cardiovascular/Heart:  Normal S1 and S2, no murmur. No edema   GI/Abdomen:   Soft, appropriately tender to palpation, non-distended, incision clean, dry, and intact               Musculoskeletal/ Extremities:   Moves all extremities well   Skin: No bleeding, bruising or rash, no induration   Psychiatric : Alert and oriented x3.  No depression or anxiety    Neurologic: Cranial nerves 2 - 12 grossly intact, sensation intact, Motor power is normal and equal bilaterally.     Results Review:       Lab Results (last 24 hours)       Procedure Component Value Units Date/Time    Blood Gas, Arterial With Co-Ox [[id number]]  (Abnormal) Collected: 08/16/23 0731    Specimen: Arterial Blood Updated: 08/16/23 0732     Site Right Radial     Glenn's Test N/A     pH, Arterial 7.358 pH units      pCO2, Arterial 65.5 mm Hg      Comment: 83 Value above reference range         pO2, Arterial 70.7 mm Hg      Comment: 84 Value below reference range        HCO3, Arterial 36.8 mmol/L      Comment: 83 Value above reference range        Base Excess, Arterial 9.6 mmol/L      Comment: 83 Value above reference range        O2 Saturation, Arterial 92.2 %      Comment: 84 Value below reference range        Hematocrit, Blood Gas 30.4 %      Comment: 84 Value below reference range        Oxyhemoglobin 90.8 %      Comment: 84 Value below reference range        Methemoglobin 0.70 %      Carboxyhemoglobin 0.8 %      A-a DO2 76.2 mmHg      Barometric Pressure for Blood Gas 734 mmHg      Modality Nasal Cannula     FIO2 32 %      Flow Rate 3.0 lpm      Ventilator Mode NA     Collected by      Comment: Meter: [id number]     :  [id number]        pH, Temp Corrected --     pCO2, Temperature Corrected --     pO2, Temperature Corrected --    CBC & Differential [[id number]]  (Abnormal) Collected: 08/16/23 0500    Specimen: Blood Updated: 08/16/23 0542    Narrative:      The following orders were created for panel order CBC & Differential.  Procedure                               Abnormality         Status                     ---------                               -----------         ------                     CBC Auto Differential[[id number]]        Abnormal            Final result               Scan Slide[[id number]]                                       Final result                 Please view results for these tests on the individual orders.    Scan Slide [[id number]] Collected: 08/16/23 0500    Specimen: Blood Updated: 08/16/23 0542     Anisocytosis Mod/2+     Hypochromia Mod/2+     Poikilocytes Slight/1+     WBC Morphology Normal     Platelet Estimate Adequate    Basic Metabolic Panel [[id number]]  (Abnormal) Collected: 08/16/23 0500    Specimen: Blood Updated: 08/16/23 0536     Glucose 99 mg/dL      BUN 19 mg/dL      Creatinine 0.71 mg/dL      Sodium 144 mmol/L      Potassium 3.7 mmol/L       Chloride 103 mmol/L      CO2 32.9 mmol/L      Calcium 7.7 mg/dL      BUN/Creatinine Ratio 26.8     Anion Gap 8.1 mmol/L      eGFR 88.2 mL/min/1.73     Narrative:      GFR Normal >60  Chronic Kidney Disease <60  Kidney Failure <15    The GFR formula is only valid for adults with stable renal function between ages 18 and 70.    CBC Auto Differential [[id number]]  (Abnormal) Collected: 08/16/23 0500    Specimen: Blood Updated: 08/16/23 0523     WBC 8.96 10*3/mm3      RBC 4.14 10*6/mm3      Hemoglobin 9.9 g/dL      Hematocrit 34.3 %      MCV 82.9 fL      MCH 23.9 pg      MCHC 28.9 g/dL      RDW 21.1 %      RDW-SD 62.8 fl      MPV 11.3 fL      Platelets 202 10*3/mm3      Neutrophil % 84.9 %      Lymphocyte % 6.0 %      Monocyte % 8.0 %      Eosinophil % 0.0 %      Basophil % 0.2 %      Immature Grans % 0.9 %      Neutrophils, Absolute 7.60 10*3/mm3      Lymphocytes, Absolute 0.54 10*3/mm3      Monocytes, Absolute 0.72 10*3/mm3      Eosinophils, Absolute 0.00 10*3/mm3      Basophils, Absolute 0.02 10*3/mm3      Immature Grans, Absolute 0.08 10*3/mm3      nRBC 0.0 /100 WBC                 Assessment & Plan       Partial small bowel obstruction    Patient is POD7 from exploratory laparotomy, adhesiolysis, and reduction of small bowel volvulus. She continues to improve every day. Significantly more alert today, now appears to be at baseline mental status. Having bowel function, tolerating diet. Continue to advance as tolerated. She is stable for transfer out of the ICU from my standpoint. Continue to ambulate with assistance/PT.     Bianca Isaac DO  08/16/23  14:02 EDT

## 2023-08-16 NOTE — PLAN OF CARE
Goal Outcome Evaluation:  Plan of Care Reviewed With: patient        Progress: improving  Outcome Evaluation: Pt supine in bed and willing to participate with treatment.  Pt transfered to EOB with min a.  Pt performed sit to stand with cga with rw and ambulated 4' cgamin a with rw.  It was noted that pt was initially 97% with 3L sitting EOB dropped to 77% and was titrated up to 4L and rebounded to 91% sitting EOB. Pt transfering from bed to chair dropped to 88% and once in chair was able to maintain 90-92%.  See flowsheet for details

## 2023-08-16 NOTE — PROGRESS NOTES
Adult Nutrition  Assessment/PES    Patient Name:  Gabi Dudley  YOB: 1945  MRN: [id number]  Admit Date:  8/7/2023    Assessment Date:  8/16/2023    Comments:    Diet advanced to clears with nectar thick liquids yesterday. PO intakes averaging 25% x 3 meals. Unfortunately, Simply Thick is not compatible with Boost Breeze or other clear ONS and therefore will be unable to provide at this time. Will monitor for diet advancement and will order alternative supplement. Pt at high risk for refeeding syndrome, electrolytes Mg++, Phos, K+ should be monitored carefully. Will F/U. Available PRN.      Reason for Assessment       Row Name 08/16/23 0810          Reason for Assessment    Reason For Assessment follow-up protocol                      Labs/Tests/Procedures/Meds       Row Name 08/16/23 0810          Labs/Procedures/Meds    Lab Results Reviewed reviewed        Medications    Pertinent Medications Reviewed reviewed                    Physical Findings       Row Name 08/16/23 0811          Physical Findings    Overall Physical Appearance normal weight for age, post-op incision                    Estimated/Assessed Needs - Anthropometrics       Row Name 08/16/23 0300          Anthropometrics    Weight 76.7 kg (169 lb 1.5 oz)                    Nutrition Prescription Ordered       Row Name 08/16/23 0811          Nutrition Prescription PO    Current PO Diet Clear Liquid     Fluid Consistency Nectar/syrup thick                    Evaluation of Received Nutrient/Fluid Intake       Row Name 08/16/23 0819          Intake Assessment    Energy/Calorie Requirement Assessment not meeting needs     Protein Requirement Assessment not meeting needs        PO Evaluation    Number of Days PO Intake Evaluated 2 days     Number of Meals 3     % PO Intake 25                       Problem/Interventions:   Problem 1       Row Name 08/16/23 0819          Nutrition Diagnoses Problem 1    Problem 1 Altered GI Function      Etiology (related to) Medical Diagnosis     Gastrointestinal Constipation;SBO     Signs/Symptoms (evidenced by) NPO     Resolved? Yes                    Problem 2       Row Name 08/16/23 0819          Nutrition Diagnoses Problem 2    Problem 2 Inadequate Nutrient Intake     Etiology (related to) MNT for Treatment/Condition;Factors Affecting Nutrition     Signs/Symptoms (evidenced by) Report of Minimal PO Intake;Clear Liquid Diet                        Intervention Goal       Row Name 08/16/23 0820          Intervention Goal    General Maintain nutrition;Meet nutritional needs for age/condition;Disease management/therapy     PO Increase intake;Tolerate PO;Meet estimated needs     Weight Maintain weight                    Nutrition Intervention       Row Name 08/16/23 0820          Nutrition Intervention    RD/Tech Action Follow Tx progress;Encourage intake;Care plan reviewd                    Nutrition Prescription       Row Name 08/16/23 0820          Other Orders    Obtain Weight Daily     Obtain Weight Ordered? No, recommended     Supplement Vitamin mineral supplement     Supplement Ordered? No, recommended     Labs K+;Phos;Mg++;Na+     Labs Ordered? No, recommended                    Education/Evaluation       Row Name 08/16/23 0820          Education    Education Will Instruct as appropriate        Monitor/Evaluation    Monitor Per protocol;PO intake;Weight;Symptoms;Pertinent labs;Skin status                     Electronically signed by:  Irma Nugent RD  08/16/23 08:20 EDT

## 2023-08-16 NOTE — PLAN OF CARE
Goal Outcome Evaluation:  Plan of Care Reviewed With: patient        Progress: improving  Outcome Evaluation: OT tx completed. Patient supine in bed, c/o 8/10 generalized pain. Patient is on 3L O2 satting 93%. Patient moved to EOB with min A, sats dropped to 77%, increased to 4L and with deep breathing exercises returned to 90. Patient performed UBB and UBD with min A, grooming tasks with SBA, required mod A to ofe socks and pull up. Patient performed sit to stand and tf to chair with CGA-Min A. Patient performed BUE AROM x 10, left sitting in chair with PT present. Continue OT POC      Anticipated Discharge Disposition (OT): inpatient rehabilitation facility

## 2023-08-16 NOTE — PLAN OF CARE
Goal Outcome Evaluation:      Vss.  Pt has not been confused, but does continue to refuse bipap

## 2023-08-16 NOTE — CASE MANAGEMENT/SOCIAL WORK
Case Management/Social Work    Patient Name:  Gabi Dudley  YOB: 1945  MRN: [id number]  Admit Date:  8/7/2023    LIBIA spoke with pt. Daughter on the phone to provide her with an update on Pt. Discharging to [facility]. LIBIA explained that she spoke with Magaly Chung at [facility] and they are reviewing Pt. Therapy notes and will follow up with LIBIA/ LIBIA/KRYSTAL will continue to follow for discharge planning.           Electronically signed by:  Davian Celeste  08/16/23 12:44 EDT

## 2023-08-16 NOTE — PROGRESS NOTES
"  CC: Acute Respiratory Failure.  Acute encephalopathy    S: Currently sitting on the side of the bed.  Much more awake and alert today.  As per the nursing staff and the patient herself, she did not use NIV device last night.  Says she only has minimal pain at the site of surgery.  Does not remember the first few days in the hospital but is completely aware of the last 24-36 hours.  She is wondering when she could go home.  Upon further questioning, she does admit to not using her trilogy device at home on a regular basis.  She could not specifically describe any significant issues with the mask but does mention occasional issues with the pressure that seems to be \"too much\" on occasion.    ROS: Is complaining of some pain at the site of surgery.  Denies any shortness of breath but does admit to mild occasional cough.    O:Vital signs reviewed.   /55   Pulse 69   Temp 97.8 øF (36.6 øC) (Temporal)   Resp 18   Ht 165.1 cm (65\")   Wt 76.7 kg (169 lb 1.5 oz)   SpO2 98%   BMI 28.14 kg/mý     Temp (24hrs), Av.9 øF (36.6 øC), Min:97.2 øF (36.2 øC), Max:98.7 øF (37.1 øC)      I & Os reviewed.   Intake/Output         08/15/23 0700 - 23 0659 23 0700 - 23 0659    Intake (ml) 671 200    Output (ml) 3310 --    Net (ml) -2639 200    Last Weight 76.7 kg (169 lb 1.5 oz) --            Net IO Since Admission: 6,105.12 mL [23 0853]    General/Constitutional: Does not appear to be in any significant distress.  Eyes: PERRL. EOMI  Neck: Supple without JVD. No obvious masses noted.   Cardiovascular: S1 + S2.  Regular at this time with occasional irregular rhythm noted.  Lungs/Respiratory: Fair air entry noted.  No significant wheezing heard.  Bibasilar crackles noted.  GI/Abdomen: Postsurgical.  Soft. Bowel sounds hypoactive.  No obvious organomegaly noted.  Musculoskeletal/Extremities: Trace edema noted. Gait could not be assessed at this time, as the patient was laying in bed.   Neurologic: Was " able to follow commands.  Answer questions appropriately.  Psych: AAOx3.  Appears in good spirits today  Skin: Appeared somewhat dry       Labs: Reviewed.   Results from last 7 days   Lab Units 08/16/23  0500 08/15/23  0422 08/14/23 0427 08/13/23  0352 08/12/23  1541 08/12/23  0412   WBC 10*3/mm3 8.96 10.86* 8.02 5.17  --  3.96   HEMOGLOBIN g/dL 9.9* 9.8* 9.3* 9.6* 9.2* 7.3*   HEMATOCRIT % 34.3 34.0 32.3* 32.5* 32.3* 27.7*   PLATELETS 10*3/mm3 202 199 172 133*  --  115*   NEUTROPHIL % % 84.9*  --   --   --   --   --    NEUTROS ABS 10*3/mm3 7.60*  --   --   --   --   --    EOSINOPHIL % % 0.0*  --   --   --   --   --    EOS ABS 10*3/mm3 0.00  --   --   --   --   --    LYMPHOCYTE % % 6.0*  --   --   --   --   --    LYMPHS ABS 10*3/mm3 0.54*  --   --   --   --   --        Lab Results   Component Value Date    PROCALCITO 0.12 08/15/2023    PROCALCITO 0.11 08/09/2023    PROCALCITO 0.05 07/20/2023       No results found for: CRP    No results found for: SEDRATE    Lab Results   Component Value Date    PROBNP 10,558.0 (H) 08/15/2023    PROBNP 1,694.0 07/30/2023    PROBNP 1,543.0 07/20/2023       Results from last 7 days   Lab Units 08/16/23  0500 08/15/23  0422 08/14/23  1501 08/14/23  0427 08/13/23  1350 08/13/23  0352   SODIUM mmol/L 144 145  --  148*   < > 150*   POTASSIUM mmol/L 3.7 3.9 3.6 3.4*   < > 3.3*   CHLORIDE mmol/L 103 107  --  107   < > 106   CO2 mmol/L 32.9* 28.7  --  31.6*   < > 30.7*   BUN mg/dL 19 20  --  30*   < > 31*   CREATININE mg/dL 0.71 0.66  --  0.59   < > 0.72   CALCIUM mg/dL 7.7* 7.7*  --  7.8*   < > 8.2*   ANION GAP mmol/L 8.1 9.3  --  9.4   < > 13.3   BILIRUBIN mg/dL  --  0.6  --  0.4  --  0.4   ALK PHOS U/L  --  48  --  48  --  53   ALT (SGPT) U/L  --  22  --  22  --  21   AST (SGOT) U/L  --  22  --  21  --  19   GLUCOSE mg/dL 99 81  --  125*   < > 150*   TOTAL PROTEIN g/dL  --  5.4*  --  5.4*  --  5.3*   ALBUMIN g/dL  --  3.4*  --  3.5  --  3.2*    < > = values in this interval not displayed.        Results from last 7 days   Lab Units 08/13/23  0139 08/12/23  1541 08/12/23  0412   MAGNESIUM mg/dL  --   --  2.3   PHOSPHORUS mg/dL 4.7* 1.5* 0.3*             Lab Results   Component Value Date    CKTOTAL 126 11/16/2021    CKTOTAL 121 10/27/2020    CKTOTAL 77 11/18/2019       No components found for: HSTROPT    Lab Results   Component Value Date    TROPONINT 22 (H) 07/30/2023    TROPONINT 23 (H) 07/30/2023    TROPONINT 17 (H) 07/20/2023       No results found for: DDIMER    Brief Urine Lab Results  (Last result in the past 365 days)        Color   Clarity   Blood   Leuk Est   Nitrite   Protein   CREAT   Urine HCG        08/11/23 0847             105.2                 Lab Results   Component Value Date    TSH 0.633 05/06/2022    TSH 2.120 11/16/2021    TSH 2.910 10/27/2020       No results found for: FREET4      Micro: As of August 16, 2023   No results found for: RESPCX  No results found for: BCIDPCR  Lab Results   Component Value Date    BLOODCX No growth at 5 days 05/22/2023    BLOODCX No growth at 5 days 05/22/2023    BLOODCX No growth at 5 days 11/21/2022     Lab Results   Component Value Date    URINECX No growth 06/05/2017     No results found for: MRSACX  Lab Results   Component Value Date    MRSAPCR No MRSA Detected 08/13/2023    MRSAPCR MRSA Detected (A) 05/23/2023     No results found for: URCX  No components found for: LOWRESPCF  No results found for: THROATCX  No results found for: CULTURES  No components found for: STREPBCX  No results found for: STREPPNEUAG  No results found for: LEGIONELLA  No results found for: MYCOPLASCX  No results found for: GCCX  No results found for: WOUNDCX  No results found for: BODYFLDCX    Lab Results   Component Value Date    FLU Negative 01/22/2018    FLU Negative 01/22/2018       Lab Results   Component Value Date    ADENOVIRUS Not Detected 03/27/2023     Lab Results   Component Value Date    JN789C Not Detected 01/23/2023     Lab Results   Component Value Date     CVHKU1 Not Detected 01/23/2023     Lab Results   Component Value Date    CVNL63 Not Detected 01/23/2023     Lab Results   Component Value Date    CVOC43 Not Detected 01/23/2023     Lab Results   Component Value Date    HUMETPNEVS Not Detected 01/23/2023     Lab Results   Component Value Date    HURVEV Not Detected 01/23/2023     Lab Results   Component Value Date    FLUBPCR Not Detected 07/20/2023     Lab Results   Component Value Date    PARAINFLUE Not Detected 01/23/2023     Lab Results   Component Value Date    PARAFLUV2 Not Detected 01/23/2023     Lab Results   Component Value Date    PARAFLUV3 Not Detected 01/23/2023     Lab Results   Component Value Date    PARAFLUV4 Not Detected 01/23/2023     Lab Results   Component Value Date    BPERTPCR Not Detected 01/23/2023     No results found for: [id number]  Lab Results   Component Value Date    CPNEUPCR Not Detected 01/23/2023     Lab Results   Component Value Date    MPNEUMO Not Detected 01/23/2023     Lab Results   Component Value Date    FLUAPCR Not Detected 07/20/2023     No results found for: FLUAH3  No results found for: FLUAH1  Lab Results   Component Value Date    RSV Not Detected 01/23/2023     Lab Results   Component Value Date    BPARAPCR Not Detected 01/23/2023       COVID 19:  Lab Results   Component Value Date    COVID19 Not Detected 07/20/2023           ABG: Reviewed  Recent Labs     08/13/23  1030 08/15/23  0715 08/16/23  0731   PHART 7.388 7.364 7.358   CQL8ZEP 49.9* 55.3* 65.5*   PO2ART 112.0* 306.0* 70.7*   VGZ7ICA 30.1* 31.5* 36.8*   BASEEXCESS 4.2* 5.0* 9.6*         Lab Results   Component Value Date    LACTATE 1.6 08/10/2023    LACTATE 0.8 08/09/2023    LACTATE 1.0 08/07/2023         Echo: Results for orders placed in visit on 11/02/22    Adult Transthoracic Echo Complete W/ Cont if Necessary Per Protocol    Interpretation Summary  1.  Normal left ventricular size and systolic function, LVEF 60-65%.  2.  Mild concentric LVH.  3.  Normal LV  diastolic filling pattern.  4.  Normal right ventricular size and systolic function.  5.  Mildly increased left atrial volume index.  6.  No significant valvular abnormalities.      Results for orders placed during the hospital encounter of 06/15/20    Adult Transthoracic Echo Complete W/ Cont if Necessary Per Protocol    Interpretation Summary  1.  Normal left ventricular size with low normal LV systolic function, LVEF 50-55%.  2.  Mild concentric LVH.  3.  Normal LV diastolic filling pattern.  4.  Mild right ventricular dilation with normal RV systolic function.  5.  Normal left and right atrial size.  6.  Trace MR and TR.        Pharmacy to Dose enoxaparin (LOVENOX),   Pharmacy to Dose Zosyn,         CXRay: Latest imaging study was reviewed personally.   Imaging Results (Last 24 Hours)       ** No results found for the last 24 hours. **              Assessment & Recommendations/Plan:   1.  Acute hypercarbic respiratory Failure.  Today's ABG shows mild worsening respiratory acidosis, with at least mild worsening.  I had a long discussion with the patient, the presence of a family member, and told her about the importance to using NIV device on a regular basis.    I told the patient that she has severe COPD and hypercarbia seems to be worsening even at baseline.  I told her that if she remains noncompliant with NIV device, it is likely that her chronic hypercarbic respiratory failure will continue to worsen.  Waiting on actual prescription from the [employer], for the patient's home NIV device.  Will consider adjusting the settings, as needed.  I have asked the respiratory therapist to try AVAPS 1 or 2 hours on with 2-3 hours break throughout the day today.    2.  Acute encephalopathy/anxiety  Mostly resolved   Was likely multifactorial  Patient has been restarted on her home medications, including SSRIs.  I will discontinue IV Benadryl .  Can use Vistaril 25 mg IM q. 8 hourly, as needed for anxiety.    3.  Severe  COPD   Last FEV1 of 31% predicted  Will discontinue Pulmicort and Brovana  We will start Symbicort  Continue Spiriva  Apparently is on Breztri at home, which will be continued upon discharge.  Will discontinue Solu-Medrol and start the patient on oral prednisone for 3 more days    4.  Chronic respiratory acidosis  It is clear that the patient is noncompliant, with NIV device, at home, but her on admission and as demonstrated by review of ABG.  May need to make some adjustments, after the settings are reviewed    5.  Anemia.    Continue close monitoring    6.  Abnormal CT of the chest    Lab Results   Component Value Date    PROCALCITO 0.12 08/15/2023    PROCALCITO 0.11 08/09/2023    PROCALCITO 0.05 07/20/2023   Started on Zosyn on 8/13/2023, for possible pneumonia  We will suggest discontinuation of antibiotics within 3-5 days total, as clinical suspicion for pneumonia is very low.  Procalcitonin is unremarkable.  The CT findings appear to suggest inflammatory changes rather than true consolidation/infiltrate.  Due to significant COPD and history of smoking, may need to be followed outpatient with repeat CT in 8-12 weeks or so.  This will be ordered upon discharge    7.  Heavy ex-smoker  Quit smoking a few years ago    8.  Hypernatremia  Mostly resolved  Continue close follow-up    9.  Atrial fibrillation  Cardiology has evaluated the patient  On Lovenox  On amiodarone    10.  Fluid status  BNP was significantly elevated  Lasix was administered on 8/15/2023  Continue Diuril 500 mg for 1 more day, today  Net IO Since Admission: 6,105.12 mL [08/16/23 0853]  Lab Results   Component Value Date    PROBNP 10,558.0 (H) 08/15/2023    PROBNP 1,694.0 07/30/2023    PROBNP 1,543.0 07/20/2023     11.  GI/nutrition  Nutrition per Dietician.   GI prophylaxis per admitting attending.    12.  DVT prophylaxis  Per admitting attending.      We have reviewed patient's current orders and changes, if any, have been suggested to primary  care team. Plan was also discussed with nursing staff, as necessary.     This document was electronically signed by Herbert Poe MD on 08/16/23 at 08:53 EDT      Dictated utilizing Dragon dictation.

## 2023-08-16 NOTE — PLAN OF CARE
Goal Outcome Evaluation:              Outcome Evaluation: Re-eval of pt.'s swallowing for diet upgrade completed. Pt. was given trials of regular solid and thin liquids. No overt s/s aspiration or other difficulty with trials, but pt. continues to have some generalized weakness that may affect coordination and safety at times and delayed initiation of pharyngeal swallow per palpation. Recommend: 1. soft to chew diet with thin liq as akhil, 2.meds whole with thin liq or pudding/applesauce as akhil, 3. no mixed consistencies, 4. aspiration precautions, 5. reflux precautions. Will follow for diet akhil.

## 2023-08-17 LAB
A-A DO2: 55.6 MMHG
ANION GAP SERPL CALCULATED.3IONS-SCNC: 4.2 MMOL/L (ref 5–15)
ARTERIAL PATENCY WRIST A: ABNORMAL
ATMOSPHERIC PRESS: 732 MMHG
BASE EXCESS BLDA CALC-SCNC: 13.1 MMOL/L (ref 0–2)
BDY SITE: ABNORMAL
BUN SERPL-MCNC: 18 MG/DL (ref 8–23)
BUN/CREAT SERPL: 25.4 (ref 7–25)
CALCIUM SPEC-SCNC: 8.4 MG/DL (ref 8.6–10.5)
CHLORIDE SERPL-SCNC: 100 MMOL/L (ref 98–107)
CO2 SERPL-SCNC: 33.8 MMOL/L (ref 22–29)
COHGB MFR BLD: 0.7 % (ref 0–2)
CREAT SERPL-MCNC: 0.71 MG/DL (ref 0.57–1)
DEPRECATED RDW RBC AUTO: 62.3 FL (ref 37–54)
EGFRCR SERPLBLD CKD-EPI 2021: 88.2 ML/MIN/1.73
ERYTHROCYTE [DISTWIDTH] IN BLOOD BY AUTOMATED COUNT: 20.3 % (ref 12.3–15.4)
GLUCOSE SERPL-MCNC: 143 MG/DL (ref 65–99)
HCO3 BLDA-SCNC: 38.8 MMOL/L (ref 22–28)
HCT VFR BLD AUTO: 33.3 % (ref 34–46.6)
HCT VFR BLD CALC: 27.7 %
HGB BLD-MCNC: 9.5 G/DL (ref 12–15.9)
INHALED O2 CONCENTRATION: 30 %
Lab: ABNORMAL
MCH RBC QN AUTO: 23.9 PG (ref 26.6–33)
MCHC RBC AUTO-ENTMCNC: 28.5 G/DL (ref 31.5–35.7)
MCV RBC AUTO: 83.9 FL (ref 79–97)
METHGB BLD QL: 0.6 % (ref 0–1.5)
MODALITY: ABNORMAL
OXYHGB MFR BLDV: 95.3 % (ref 94–99)
PCO2 BLDA: 55.6 MM HG (ref 35–45)
PCO2 TEMP ADJ BLD: ABNORMAL MM[HG]
PH BLDA: 7.45 PH UNITS (ref 7.35–7.45)
PH, TEMP CORRECTED: ABNORMAL
PLATELET # BLD AUTO: 84 10*3/MM3 (ref 140–450)
PO2 BLDA: 88 MM HG (ref 75–100)
PO2 TEMP ADJ BLD: ABNORMAL MM[HG]
POTASSIUM SERPL-SCNC: 3.7 MMOL/L (ref 3.5–5.2)
RBC # BLD AUTO: 3.97 10*6/MM3 (ref 3.77–5.28)
SAO2 % BLDCOA: 96.5 % (ref 94–100)
SODIUM SERPL-SCNC: 138 MMOL/L (ref 136–145)
VENTILATOR MODE: ABNORMAL
WBC NRBC COR # BLD: 8.54 10*3/MM3 (ref 3.4–10.8)

## 2023-08-17 PROCEDURE — 83050 HGB METHEMOGLOBIN QUAN: CPT

## 2023-08-17 PROCEDURE — 63710000001 PREDNISONE PER 1 MG: Performed by: INTERNAL MEDICINE

## 2023-08-17 PROCEDURE — 94660 CPAP INITIATION&MGMT: CPT

## 2023-08-17 PROCEDURE — 94761 N-INVAS EAR/PLS OXIMETRY MLT: CPT

## 2023-08-17 PROCEDURE — 82375 ASSAY CARBOXYHB QUANT: CPT

## 2023-08-17 PROCEDURE — 94664 DEMO&/EVAL PT USE INHALER: CPT

## 2023-08-17 PROCEDURE — 25010000002 PIPERACILLIN SOD-TAZOBACTAM PER 1 G: Performed by: INTERNAL MEDICINE

## 2023-08-17 PROCEDURE — 82805 BLOOD GASES W/O2 SATURATION: CPT

## 2023-08-17 PROCEDURE — 94799 UNLISTED PULMONARY SVC/PX: CPT

## 2023-08-17 PROCEDURE — 99024 POSTOP FOLLOW-UP VISIT: CPT | Performed by: STUDENT IN AN ORGANIZED HEALTH CARE EDUCATION/TRAINING PROGRAM

## 2023-08-17 PROCEDURE — 36600 WITHDRAWAL OF ARTERIAL BLOOD: CPT

## 2023-08-17 PROCEDURE — 85027 COMPLETE CBC AUTOMATED: CPT | Performed by: FAMILY MEDICINE

## 2023-08-17 PROCEDURE — 99291 CRITICAL CARE FIRST HOUR: CPT | Performed by: INTERNAL MEDICINE

## 2023-08-17 PROCEDURE — 80048 BASIC METABOLIC PNL TOTAL CA: CPT | Performed by: FAMILY MEDICINE

## 2023-08-17 PROCEDURE — 99232 SBSQ HOSP IP/OBS MODERATE 35: CPT | Performed by: FAMILY MEDICINE

## 2023-08-17 RX ORDER — OXYCODONE AND ACETAMINOPHEN 7.5; 325 MG/1; MG/1
1 TABLET ORAL EVERY 6 HOURS PRN
Status: DISCONTINUED | OUTPATIENT
Start: 2023-08-17 | End: 2023-08-18 | Stop reason: HOSPADM

## 2023-08-17 RX ADMIN — CETIRIZINE HYDROCHLORIDE 10 MG: 10 TABLET, FILM COATED ORAL at 08:12

## 2023-08-17 RX ADMIN — QUETIAPINE FUMARATE 12.5 MG: 25 TABLET ORAL at 23:03

## 2023-08-17 RX ADMIN — DULOXETINE HYDROCHLORIDE 60 MG: 30 CAPSULE, DELAYED RELEASE ORAL at 08:12

## 2023-08-17 RX ADMIN — TIOTROPIUM BROMIDE INHALATION SPRAY 2 PUFF: 3.12 SPRAY, METERED RESPIRATORY (INHALATION) at 06:48

## 2023-08-17 RX ADMIN — NYSTATIN 500000 UNITS: 100000 SUSPENSION ORAL at 18:25

## 2023-08-17 RX ADMIN — BUDESONIDE AND FORMOTEROL FUMARATE DIHYDRATE 2 PUFF: 160; 4.5 AEROSOL RESPIRATORY (INHALATION) at 18:43

## 2023-08-17 RX ADMIN — PIPERACILLIN SODIUM AND TAZOBACTAM SODIUM 3.38 G: 3; .375 INJECTION, SOLUTION INTRAVENOUS at 08:20

## 2023-08-17 RX ADMIN — TRAZODONE HYDROCHLORIDE 100 MG: 50 TABLET ORAL at 23:04

## 2023-08-17 RX ADMIN — BUDESONIDE AND FORMOTEROL FUMARATE DIHYDRATE 2 PUFF: 160; 4.5 AEROSOL RESPIRATORY (INHALATION) at 06:48

## 2023-08-17 RX ADMIN — IPRATROPIUM BROMIDE AND ALBUTEROL SULFATE 1.5 ML: .5; 3 SOLUTION RESPIRATORY (INHALATION) at 18:43

## 2023-08-17 RX ADMIN — DILTIAZEM HYDROCHLORIDE 60 MG: 30 TABLET, FILM COATED ORAL at 08:13

## 2023-08-17 RX ADMIN — IPRATROPIUM BROMIDE AND ALBUTEROL SULFATE 3 ML: 2.5; .5 SOLUTION RESPIRATORY (INHALATION) at 06:48

## 2023-08-17 RX ADMIN — METOPROLOL TARTRATE 12.5 MG: 25 TABLET, FILM COATED ORAL at 23:04

## 2023-08-17 RX ADMIN — OXYCODONE HYDROCHLORIDE AND ACETAMINOPHEN 1 TABLET: 7.5; 325 TABLET ORAL at 18:25

## 2023-08-17 RX ADMIN — NYSTATIN 500000 UNITS: 100000 SUSPENSION ORAL at 08:12

## 2023-08-17 RX ADMIN — FLUTICASONE PROPIONATE 2 SPRAY: 50 SPRAY, METERED NASAL at 23:03

## 2023-08-17 RX ADMIN — SERTRALINE 100 MG: 50 TABLET, FILM COATED ORAL at 23:03

## 2023-08-17 RX ADMIN — ATORVASTATIN CALCIUM 20 MG: 20 TABLET, FILM COATED ORAL at 08:12

## 2023-08-17 RX ADMIN — AMIODARONE HYDROCHLORIDE 200 MG: 200 TABLET ORAL at 08:12

## 2023-08-17 RX ADMIN — PIPERACILLIN SODIUM AND TAZOBACTAM SODIUM 3.38 G: 3; .375 INJECTION, SOLUTION INTRAVENOUS at 16:10

## 2023-08-17 RX ADMIN — FLUTICASONE PROPIONATE 2 SPRAY: 50 SPRAY, METERED NASAL at 08:17

## 2023-08-17 RX ADMIN — PREDNISONE 40 MG: 20 TABLET ORAL at 08:12

## 2023-08-17 RX ADMIN — DILTIAZEM HYDROCHLORIDE 60 MG: 30 TABLET, FILM COATED ORAL at 23:06

## 2023-08-17 RX ADMIN — METOPROLOL TARTRATE 12.5 MG: 25 TABLET, FILM COATED ORAL at 08:12

## 2023-08-17 RX ADMIN — Medication 10 ML: at 08:16

## 2023-08-17 RX ADMIN — CLONAZEPAM 0.5 MG: 0.5 TABLET ORAL at 08:12

## 2023-08-17 RX ADMIN — ACETAMINOPHEN 650 MG: 325 TABLET, FILM COATED ORAL at 23:06

## 2023-08-17 RX ADMIN — DILTIAZEM HYDROCHLORIDE 60 MG: 30 TABLET, FILM COATED ORAL at 18:25

## 2023-08-17 NOTE — PROGRESS NOTES
"  CC: Acute Respiratory Failure.  Acute encephalopathy    S: According to nursing, patient did not wear NIV yesterday for more than 5 minutes.  However last night, was given Seroquel and trazodone then Klonopin and Cymbalta this morning around 815.  By 830, patient was very sleepy and nursing was able to place patient on NIV.  She has been wearing it since that time and has been more somnolent.    ROS: Unable to obtain due to mental status and BiPAP.  O:Vital signs reviewed.   /59 (BP Location: Left arm, Patient Position: Lying)   Pulse 72   Temp 99 øF (37.2 øC) (Temporal)   Resp 22   Ht 165.1 cm (65\")   Wt 74.5 kg (164 lb 3.9 oz)   SpO2 100%   BMI 27.33 kg/mý     Temp (24hrs), Av.1 øF (36.7 øC), Min:97.2 øF (36.2 øC), Max:99 øF (37.2 øC)      I & Os reviewed.   Intake/Output         23 0700 - 23 0659    Intake (ml) 1128    Output (ml) 785    Net (ml) 343    Last Weight 74.5 kg (164 lb 3.9 oz)            Net IO Since Admission: 6,248.12 mL [23 1039]    General/Constitutional: NIV in place, responds to noxious stimuli   eyes: Pupils equal, reactive to light   neck: Supple without JVD.   Cardiovascular: S1 + S2.  Regular rate   lungs/Respiratory: Good air entry, no wheezing or rhonchi appreciated   GI/Abdomen: Postsurgical.  Soft. Bowel sounds present, no obvious tenderness to palpation   musculoskeletal/Extremities: No significant pedal edema noted.  Deconditioning appreciated   neurologic: Able to fully assess given mental status  Psych: Unable to assess given mental status   skin: Appeared somewhat dry       Labs: Reviewed.   Results from last 7 days   Lab Units 23  0434 23  0500 08/15/23  0422 23  0427 23  0352   WBC 10*3/mm3 8.54 8.96 10.86* 8.02 5.17   HEMOGLOBIN g/dL 9.5* 9.9* 9.8* 9.3* 9.6*   HEMATOCRIT % 33.3* 34.3 34.0 32.3* 32.5*   PLATELETS 10*3/mm3 84* 202 199 172 133*   NEUTROPHIL % %  --  84.9*  --   --   --    NEUTROS ABS 10*3/mm3  --  7.60*  " --   --   --    EOSINOPHIL % %  --  0.0*  --   --   --    EOS ABS 10*3/mm3  --  0.00  --   --   --    LYMPHOCYTE % %  --  6.0*  --   --   --    LYMPHS ABS 10*3/mm3  --  0.54*  --   --   --        Lab Results   Component Value Date    PROCALCITO 0.12 08/15/2023    PROCALCITO 0.11 08/09/2023    PROCALCITO 0.05 07/20/2023       No results found for: CRP    No results found for: SEDRATE    Lab Results   Component Value Date    PROBNP 10,558.0 (H) 08/15/2023    PROBNP 1,694.0 07/30/2023    PROBNP 1,543.0 07/20/2023       Results from last 7 days   Lab Units 08/17/23  0434 08/16/23  0500 08/15/23  0422 08/14/23  1501 08/14/23  0427 08/13/23  1350 08/13/23  0352   SODIUM mmol/L 138 144 145  --  148*   < > 150*   POTASSIUM mmol/L 3.7 3.7 3.9   < > 3.4*   < > 3.3*   CHLORIDE mmol/L 100 103 107  --  107   < > 106   CO2 mmol/L 33.8* 32.9* 28.7  --  31.6*   < > 30.7*   BUN mg/dL 18 19 20  --  30*   < > 31*   CREATININE mg/dL 0.71 0.71 0.66  --  0.59   < > 0.72   CALCIUM mg/dL 8.4* 7.7* 7.7*  --  7.8*   < > 8.2*   ANION GAP mmol/L 4.2* 8.1 9.3  --  9.4   < > 13.3   BILIRUBIN mg/dL  --   --  0.6  --  0.4  --  0.4   ALK PHOS U/L  --   --  48  --  48  --  53   ALT (SGPT) U/L  --   --  22  --  22  --  21   AST (SGOT) U/L  --   --  22  --  21  --  19   GLUCOSE mg/dL 143* 99 81  --  125*   < > 150*   TOTAL PROTEIN g/dL  --   --  5.4*  --  5.4*  --  5.3*   ALBUMIN g/dL  --   --  3.4*  --  3.5  --  3.2*    < > = values in this interval not displayed.       Results from last 7 days   Lab Units 08/13/23  0139 08/12/23  1541 08/12/23  0412   MAGNESIUM mg/dL  --   --  2.3   PHOSPHORUS mg/dL 4.7* 1.5* 0.3*             Lab Results   Component Value Date    CKTOTAL 126 11/16/2021    CKTOTAL 121 10/27/2020    CKTOTAL 77 11/18/2019       No components found for: HSTROPT    Lab Results   Component Value Date    TROPONINT 22 (H) 07/30/2023    TROPONINT 23 (H) 07/30/2023    TROPONINT 17 (H) 07/20/2023       No results found for: DDIMER    Brief  Urine Lab Results  (Last result in the past 365 days)        Color   Clarity   Blood   Leuk Est   Nitrite   Protein   CREAT   Urine HCG        08/11/23 0847             105.2                 Lab Results   Component Value Date    TSH 0.633 05/06/2022    TSH 2.120 11/16/2021    TSH 2.910 10/27/2020       No results found for: FREET4      Micro: As of August 17, 2023   No results found for: RESPCX  No results found for: BCIDPCR  Lab Results   Component Value Date    BLOODCX No growth at 5 days 05/22/2023    BLOODCX No growth at 5 days 05/22/2023    BLOODCX No growth at 5 days 11/21/2022     Lab Results   Component Value Date    URINECX No growth 06/05/2017     No results found for: MRSACX  Lab Results   Component Value Date    MRSAPCR No MRSA Detected 08/13/2023    MRSAPCR MRSA Detected (A) 05/23/2023     No results found for: URCX  No components found for: LOWRESPCF  No results found for: THROATCX  No results found for: CULTURES  No components found for: STREPBCX  No results found for: STREPPNEUAG  No results found for: LEGIONELLA  No results found for: MYCOPLASCX  No results found for: GCCX  No results found for: WOUNDCX  No results found for: BODYFLDCX    Lab Results   Component Value Date    FLU Negative 01/22/2018    FLU Negative 01/22/2018       Lab Results   Component Value Date    ADENOVIRUS Not Detected 03/27/2023     Lab Results   Component Value Date    LP876W Not Detected 01/23/2023     Lab Results   Component Value Date    CVHKU1 Not Detected 01/23/2023     Lab Results   Component Value Date    CVNL63 Not Detected 01/23/2023     Lab Results   Component Value Date    CVOC43 Not Detected 01/23/2023     Lab Results   Component Value Date    HUMETPNEVS Not Detected 01/23/2023     Lab Results   Component Value Date    HURVEV Not Detected 01/23/2023     Lab Results   Component Value Date    FLUBPCR Not Detected 07/20/2023     Lab Results   Component Value Date    PARAINFLUE Not Detected 01/23/2023     Lab  Results   Component Value Date    PARAFLUV2 Not Detected 01/23/2023     Lab Results   Component Value Date    PARAFLUV3 Not Detected 01/23/2023     Lab Results   Component Value Date    PARAFLUV4 Not Detected 01/23/2023     Lab Results   Component Value Date    BPERTPCR Not Detected 01/23/2023     No results found for: [id number]  Lab Results   Component Value Date    CPNEUPCR Not Detected 01/23/2023     Lab Results   Component Value Date    MPNEUMO Not Detected 01/23/2023     Lab Results   Component Value Date    FLUAPCR Not Detected 07/20/2023     No results found for: FLUAH3  No results found for: FLUAH1  Lab Results   Component Value Date    RSV Not Detected 01/23/2023     Lab Results   Component Value Date    BPARAPCR Not Detected 01/23/2023       COVID 19:  Lab Results   Component Value Date    COVID19 Not Detected 07/20/2023           ABG: Reviewed  Recent Labs     08/15/23  0715 08/16/23  0731 08/17/23  0958   PHART 7.364 7.358 7.452*   LNZ3NTK 55.3* 65.5* 55.6*   PO2ART 306.0* 70.7* 88.0   VXN9SAH 31.5* 36.8* 38.8*   BASEEXCESS 5.0* 9.6* 13.1*         Lab Results   Component Value Date    LACTATE 1.6 08/10/2023    LACTATE 0.8 08/09/2023    LACTATE 1.0 08/07/2023         Echo: Results for orders placed in visit on 11/02/22    Adult Transthoracic Echo Complete W/ Cont if Necessary Per Protocol    Interpretation Summary  1.  Normal left ventricular size and systolic function, LVEF 60-65%.  2.  Mild concentric LVH.  3.  Normal LV diastolic filling pattern.  4.  Normal right ventricular size and systolic function.  5.  Mildly increased left atrial volume index.  6.  No significant valvular abnormalities.      Results for orders placed during the hospital encounter of 06/15/20    Adult Transthoracic Echo Complete W/ Cont if Necessary Per Protocol    Interpretation Summary  1.  Normal left ventricular size with low normal LV systolic function, LVEF 50-55%.  2.  Mild concentric LVH.  3.  Normal LV diastolic filling  pattern.  4.  Mild right ventricular dilation with normal RV systolic function.  5.  Normal left and right atrial size.  6.  Trace MR and TR.        Pharmacy to Dose Zosyn,         CXRay: Latest imaging study was reviewed personally.   Imaging Results (Last 24 Hours)       ** No results found for the last 24 hours. **              Assessment & Recommendations/Plan:   1.  Acute hypercarbic respiratory Failure.  Has been able to wear NIV this morning after sedating medications use.  Reviewed ABG and will adjust settings to mirror home settings    2.  Acute encephalopathy/anxiety  Had resolved, but seems to have recurred after multiple sedating medications.  Will need to consider dose adjustments and will defer to primary service.    3.  Severe COPD   Last FEV1 of 31% predicted  On Symbicort and Spiriva.  Will DC scheduled order of DuTania.  Apparently is on Breztri at home, which will be continued upon discharge.  Complete steroid taper    4.  Chronic respiratory acidosis  It is clear that the patient is noncompliant, with NIV device, at home, it is unclear how compliant she will be when she is home.    5.  Anemia.    Continue close monitoring    6.  Abnormal CT of the chest    Lab Results   Component Value Date    PROCALCITO 0.12 08/15/2023    PROCALCITO 0.11 08/09/2023    PROCALCITO 0.05 07/20/2023   Started on Zosyn on 8/13/2023, for possible pneumonia  Should complete course of Zosyn today.  Due to significant COPD and history of smoking, may need to be followed outpatient with repeat CT in 8-12 weeks or so.  This will be ordered upon discharge    7.  Heavy ex-smoker  Quit smoking a few years ago    8.  Atrial fibrillation  Cardiology has evaluated the patient  On Lovenox  On amiodarone    9.  Fluid status  BNP was significantly elevated  Off diuretics at this time, but will consider reordering if clinically indicated   Patient is still net positive since admission     Net IO Since Admission: 6,248.12 mL [08/17/23  1039]  Lab Results   Component Value Date    PROBNP 10,558.0 (H) 08/15/2023    PROBNP 1,694.0 07/30/2023    PROBNP 1,543.0 07/20/2023     Critical Care time spent in direct patient care: 35 minutes including high complexity decision making to assess, manipulate, and support vital organ system failure in this individual who has impairment of one or more vital organ systems such that there is a high probability of imminent or life threatening deterioration in the patient's condition. This time excludes other billable procedures. Time does include preparation of documents, review of old records, coordinating care with other providers and direct bedside care.   We have reviewed patient's current orders and changes, if any, have been suggested to primary care team. Plan was also discussed with nursing staff, as necessary.     This document was electronically signed by Juliet Quiroz MD on 08/17/23 at 10:39 EDT      Dictated utilizing Dragon dictation.

## 2023-08-17 NOTE — PROGRESS NOTES
LOS: 8 days   Patient Care Team:  Paul Quinteros MD as PCP - General (Internal Medicine)  Sima Moses MD as Consulting Physician (General Surgery)  Taiwo Curry MD as Consulting Physician (Cardiology)  Soledad Stauffer, EFRAÍN as Ambulatory  ([facility])       Chief Complaint: POD8 exploratory laparotomy, adhesiolysis, reduction of small bowel volvulus    HPI: Patient seen and examined at bedside in ICU. Per nursing, patient had significant amount of bright red blood from her midline incision. Was restarted on Eliquis yesterday. She has been less alert today, back on BiPAP secondary to hypercapnea. Abdominal pain well controlled. Continues to have BMs and passing flatus. Tolerating soft diet.      Vital Signs  Temp:  [97.2 øF (36.2 øC)-99 øF (37.2 øC)] 99 øF (37.2 øC)  Heart Rate:  [] 72  Resp:  [17-22] 22  BP: ()/() 141/59    Physical Exam:     General Appearance:  Less alert today, she is cooperative, no acute distress   Respiratory/Lungs:   On BiPAP   Cardiovascular/Heart:  Normal S1 and S2, no murmur. No edema   GI/Abdomen:   Soft, appropriately tender to palpation, mildly distended, midline incision with surrounding ecchymosis, minimal sanguinous drainage on current dressing.                Musculoskeletal/ Extremities:   Moves all extremities well   Skin: Diffuse ecchymosis secondary to falls at home, DVT ppx, around midline incision   Psychiatric : Alert and oriented x3.  No depression or anxiety    Neurologic: Cranial nerves 2 - 12 grossly intact, sensation intact, Motor power is normal and equal bilaterally.     Results Review:       Lab Results (last 24 hours)       Procedure Component Value Units Date/Time    Blood Gas, Arterial With Co-Ox [[id number]]  (Abnormal) Collected: 08/17/23 0958    Specimen: Arterial Blood Updated: 08/17/23 0958     Site Right Radial     Glenn's Test N/A     pH, Arterial 7.452 pH units      Comment: 83 Value above reference range         pCO2, Arterial 55.6 mm Hg      Comment: 83 Value above reference range        pO2, Arterial 88.0 mm Hg      HCO3, Arterial 38.8 mmol/L      Comment: 83 Value above reference range        Base Excess, Arterial 13.1 mmol/L      Comment: 83 Value above reference range        O2 Saturation, Arterial 96.5 %      Hematocrit, Blood Gas 27.7 %      Comment: 84 Value below reference range        Oxyhemoglobin 95.3 %      Methemoglobin 0.60 %      Carboxyhemoglobin 0.7 %      A-a DO2 55.6 mmHg      Barometric Pressure for Blood Gas 732 mmHg      Modality BiPap     FIO2 30 %      Ventilator Mode NA     Collected by cj     Comment: Meter: [id number]     :  MAY        pH, Temp Corrected --     pCO2, Temperature Corrected --     pO2, Temperature Corrected --    CBC (No Diff) [[id number]]  (Abnormal) Collected: 08/17/23 0434    Specimen: Blood Updated: 08/17/23 0542     WBC 8.54 10*3/mm3      RBC 3.97 10*6/mm3      Hemoglobin 9.5 g/dL      Hematocrit 33.3 %      MCV 83.9 fL      MCH 23.9 pg      MCHC 28.5 g/dL      RDW 20.3 %      RDW-SD 62.3 fl      Platelets 84 10*3/mm3     Narrative:      Slight platelet clumping seen, platelet estimate= decreased.    Basic Metabolic Panel [[id number]]  (Abnormal) Collected: 08/17/23 0434    Specimen: Blood Updated: 08/17/23 0520     Glucose 143 mg/dL      BUN 18 mg/dL      Creatinine 0.71 mg/dL      Sodium 138 mmol/L      Potassium 3.7 mmol/L      Chloride 100 mmol/L      CO2 33.8 mmol/L      Calcium 8.4 mg/dL      BUN/Creatinine Ratio 25.4     Anion Gap 4.2 mmol/L      eGFR 88.2 mL/min/1.73     Narrative:      GFR Normal >60  Chronic Kidney Disease <60  Kidney Failure <15    The GFR formula is only valid for adults with stable renal function between ages 18 and 70.                Assessment & Plan       Partial small bowel obstruction    Patient is POD8 from exploratory laparotomy, adhesiolysis, and reduction of small bowel volvulus. Nursing called overnight about laparotomy  incision sanguinous drainage. Removed 3 staples at bedside and interrogated fascia with Q-tip. No evidence of lower incision fascial dehiscence. Large subcutaneous hematoma in the incision likely from patient's postoperative delirium and hyperactivity. No active bleeding. Placed Kerlix packing and reapplied dressing. Continue with daily dressing changes or if saturated. Anticipate there will be drainage from incision especially if out of bed. May apply abdominal binder. Hold Eliquis overnight and monitor Hgb. Monitor wound closely for infection given hematoma and patient's immunosuppression.     Bianca Isaac DO  08/17/23  10:54 EDT

## 2023-08-17 NOTE — PROGRESS NOTES
[facility]    Name:  Gabi Dudley   Age:  78 y.o.  Sex:  female  :  1945  MRN:  [id number]   Visit Number:  [id number]  Admission Date:  2023  Date Of Service:  23  Primary Care Physician:  Paul Quinteros MD     LOS: 8 days :    Chief Complaint:      Anxiety    Subjective:    Patient was seen resting in bed with nurse at bedside. Patient was not alert/awake enough to follow commands or answer questions. Nurse reported that patient had been out of it and asleep since sometime early this morning. Patient was being placed on bipap.    Hospital Course:    Patient is a chronically ill 78-year-old female history significant for hypertension, hyperlipidemia, depression/anxiety and CAD who presents to the emergency room with 3 to 4 days of progressively worsening abdominal pain.  Patient describes the pain as intermittent and sharp, diffuse across her abdomen.  Patient has chronic constipation so cannot recall her last bowel movement.  Admits to some mild nausea, denies vomiting.  Patient does have a history of small bowel obstruction which required colectomy .  States pain is similar but not as severe as previous episode.  Nothing makes it better or worse.  Patient does admit to still passing gas.     Upon arrival to the ER, patient afebrile hemodynamically stable and nonhypoxic.  CMP unremarkable except for glucose 142.  Lactate lipase normal.  WBC 13, hemoglobin hematocrit at baseline, platelets 112.  UA negative for urinary tract infection.  CT abdomen pelvis revealed mid small bowel dilation worrisome for partial small bowel obstruction.  Dr. Isaac agreed to consult hospital service asked to admit.    Patient appears to be sleeping and not currently responding to questions/commands. Patient placed on bipap while asleep. PCO2 has increased to 65.5 and anion gap has dropped to 4.2.     Hospital course:  -2023 patient continues to have episodes  of agitation please see Dr. Dewitt note from overnight.  Currently comfortable on the BiPAP.  Still with NG output about 500 overnight per Gio the night nurse.  Discussed with Dr. Poe and Dr. Isaac. D/w Farida her daughter. D/w Pharmacy resuming meds. Numerous serotonergic meds. Reducing zoloft dose. Has had bm the last two days.     -8/13/2023: Patient had an episode of respiratory distress today.  Was trying to come off the BiPAP.  Had some upper airway sounds concerning for stridor.  Was given medications to help BiPAP synchrony including Haldol Benadryl Ativan.  ABG reviewed.  Following this her heart rate normalized from the 160s to the 80s went from atrial fibrillation back to sinus rhythm.  Went down for a CT which ruled out tracheal obstruction.  Updated Dr. Poe on these changes.  Discussed with family.     -8/12/2023: Saw patient at the bedside more awake conversant today.  Family had concerns addressed. Dr. Simon saw. MRI reviewed with family at bedside. Patient had increased alertness but also too increased agitation for which I added medication in hopes of controlling this. Not hematuria in martinez bag but good urine output gave 1 u prbc.     -8/11/2023 patient with disorientation this morning.  Decreased pain medicine dose ordered CT of the head which was negative for acute process.     -8/10/2023: Patient converted to sinus rhythm this morning.  Dr. Curry's note reviewed.  Was taken to the OR for adhesiolysis by Dr. Isaac with assist from Dr. Bowden last evening.  Currently n.p.o. with NG tube in place.     -8/9/2023 patient with RVR overnight was started on Cardizem but that did not help.  Persistent pain discussed with Dr. Isaac taking  to the OR.  Discussed with Dr. Curry who recommended amiodarone and discussed perioperative risk management with Dr. Isaac.  8/8/2023 Pain not well controlled increased morphine dose from 1 mg to 2 mg    Review of Systems:     All systems were reviewed and negative  except as mentioned in subjective, assessment and plan.    Vital Signs:    Temp:  [97.2 øF (36.2 øC)-99 øF (37.2 øC)] 99 øF (37.2 øC)  Heart Rate:  [] 72  Resp:  [17-22] 22  BP: ()/() 141/59    Intake and output:    I/O last 3 completed shifts:  In: 1407 [P.O.:1160; I.V.:247]  Out: 1775 [Urine:1775]  No intake/output data recorded.    Physical Examination:    General Appearance:  Asleep and unresponsive to questions and commands.   Head:  Atraumatic and normocephalic.   Eyes: Conjunctivae and sclerae normal, no icterus. No pallor.   Throat: No oral lesions, no thrush, oral mucosa moist.   Neck: Supple, trachea midline, no thyromegaly.   Lungs:   Breath sounds heard bilaterally equally.  No wheezing or crackles. No Pleural rub or bronchial breathing.   Heart:  Normal S1 and S2, no murmur, no gallop, no rub. No JVD.   Abdomen:   Normal bowel sounds, no masses, no organomegaly. Soft, nontender, nondistended, no rebound tenderness.   Extremities: Supple, no edema, no cyanosis, no clubbing.   Skin: Warm.   Neurologic: Alert and oriented x 3. No facial asymmetry. Moves all four limbs. No tremors.      Laboratory results:    Results from last 7 days   Lab Units 08/17/23  0434 08/16/23  0500 08/15/23  0422 08/14/23  1501 08/14/23  0427 08/13/23  1350 08/13/23  0352   SODIUM mmol/L 138 144 145  --  148*   < > 150*   POTASSIUM mmol/L 3.7 3.7 3.9   < > 3.4*   < > 3.3*   CHLORIDE mmol/L 100 103 107  --  107   < > 106   CO2 mmol/L 33.8* 32.9* 28.7  --  31.6*   < > 30.7*   BUN mg/dL 18 19 20  --  30*   < > 31*   CREATININE mg/dL 0.71 0.71 0.66  --  0.59   < > 0.72   CALCIUM mg/dL 8.4* 7.7* 7.7*  --  7.8*   < > 8.2*   BILIRUBIN mg/dL  --   --  0.6  --  0.4  --  0.4   ALK PHOS U/L  --   --  48  --  48  --  53   ALT (SGPT) U/L  --   --  22  --  22  --  21   AST (SGOT) U/L  --   --  22  --  21  --  19   GLUCOSE mg/dL 143* 99 81  --  125*   < > 150*    < > = values in this interval not displayed.     Results from  last 7 days   Lab Units 08/17/23  0434 08/16/23  0500 08/15/23  0422   WBC 10*3/mm3 8.54 8.96 10.86*   HEMOGLOBIN g/dL 9.5* 9.9* 9.8*   HEMATOCRIT % 33.3* 34.3 34.0   PLATELETS 10*3/mm3 84* 202 199                 Recent Labs     08/13/23  1030 08/15/23  0715 08/16/23  0731   PHART 7.388 7.364 7.358   WUK8KSI 49.9* 55.3* 65.5*   PO2ART 112.0* 306.0* 70.7*   MDG5EZP 30.1* 31.5* 36.8*   BASEEXCESS 4.2* 5.0* 9.6*      I have reviewed the patient's laboratory results.    Radiology results:    No radiology results from the last 24 hrs  I have reviewed the patient's radiology reports.    Medication Review:     I have reviewed the patient's active and prn medications.     Problem List:      Partial small bowel obstruction      Assessment:    Partial small bowel obstruction due to volvulus.    Acute on Chronic respiratory failure s/p intubation  History of colectomy due to obstruction in 2018  Paroxysmal A-fib with RVR   Hypertension  Hyperlipidemia  Depression/anxiety  GERD  Delirium    Plan:    Partial small bowel obstruction due to volvulus.    Exploratory laparotomy, adhesiolysis and reduction of small bowel volvulus by Dr. Isaac on 8/9/2023. Dr. Isaac with general surgery consulting appreciate recommendations. Speech evaluation ordered. Soft diet with thin liquids and no mixed consistencies recommended. Aspiration and Reflux precautions. Medications whole with thin liquids or pudding/applesauce as tolerated recommended. Continue pain control. PT/OT consulted.     Acute on Chronic respiratory failure s/p intubation  HAP  Severe COPD  Pulmonology following, appreciate recommendations. Continue supplemental oxygen and NIV therapy, patient was placed on bipap since sleeping. Continue p.o. prednisone and bronchodilators. Given Diuril. Continue IV Zosyn for HAP x4 days. Recommend outpatient follow-up with repeat CT in 8-12 weeks.     Paroxysmal A-fib with RVR   Cardiology consulted. Switched from Lovenox to Eliquis on  8/17/23. Continue amiodarone, Lopressor and Cardizem     Hypertension  Continue antihypertensives, as well as hydralazine and labetalol as needed     Delirium, resolved  Appears to be improved. Patient previously evaluated by neurology. Thought to be metabolic secondary to hospitalization and respiratory status. Continue Vistaril, Klonopin and Geodon as needed.     Anemia  Hemoglobin has remains stable since transfusion.Continue  monitoring. Hematuria resolved, likely traumatic due to agitation and pulling at lines.       DVT Prophylaxis: Eliquis  Code Status: Full  Diet: Advance as tolerated  Discharge Plan: At least 1 more day then to [facility], Medical Student  08/17/23  09:30 EDT    Dictated utilizing Dragon dictation.

## 2023-08-17 NOTE — PROGRESS NOTES
Adult Nutrition  Assessment/PES    Patient Name:  Gabi Dudley  YOB: 1945  MRN: [id number]  Admit Date:  8/7/2023    Assessment Date:  8/17/2023    Comments:    Diet advanced to soft with chopped meats, thin liquids. PO intakes averaging 31.3% x 4 meals. Will order Boost Plus BID to supplement intakes. Pt at high risk of refeeding syndrome, should monitor electrolytes Phos, K+, Mg++ closely. RD to F/U. Available PRN.      Reason for Assessment       Row Name 08/17/23 1242          Reason for Assessment    Reason For Assessment follow-up protocol                      Labs/Tests/Procedures/Meds       Row Name 08/17/23 1243          Labs/Procedures/Meds    Lab Results Reviewed reviewed        Medications    Pertinent Medications Reviewed reviewed                    Physical Findings       Row Name 08/17/23 1244          Physical Findings    Overall Physical Appearance normal weight, opst-op incision                      Nutrition Prescription Ordered       Row Name 08/17/23 1244          Nutrition Prescription PO    Current PO Diet Soft Texture     Texture Chopped     Fluid Consistency Thin                    Evaluation of Received Nutrient/Fluid Intake       Row Name 08/17/23 1244          Intake Assessment    Energy/Calorie Requirement Assessment not meeting needs     Protein Requirement Assessment not meeting needs        PO Evaluation    Number of Days PO Intake Evaluated 2 days     Number of Meals 4     % PO Intake 31.3                       Problem/Interventions:   Problem 1       Row Name 08/17/23 1245          Nutrition Diagnoses Problem 1    Problem 1 Altered GI Function     Etiology (related to) Medical Diagnosis     Gastrointestinal Constipation;SBO     Signs/Symptoms (evidenced by) NPO     Resolved? Yes                    Problem 2       Row Name 08/17/23 1245          Nutrition Diagnoses Problem 2    Problem 2 Inadequate Nutrient Intake     Etiology (related to) MNT for  Treatment/Condition;Factors Affecting Nutrition     Signs/Symptoms (evidenced by) Report of Minimal PO Intake                        Intervention Goal       Row Name 08/17/23 1245          Intervention Goal    General Maintain nutrition;Meet nutritional needs for age/condition;Disease management/therapy;Improved nutrition related lab(s)     PO Increase intake;Tolerate PO;Meet estimated needs     Weight Maintain weight                    Nutrition Intervention       Row Name 08/17/23 1245          Nutrition Intervention    RD/Tech Action Follow Tx progress;Encourage intake;Recommend/ordered;Care plan reviewd     Recommended/Ordered Supplement                    Nutrition Prescription       Row Name 08/17/23 1245          Nutrition Prescription PO    PO Prescription Begin/change supplement     Supplement Boost Plus     Supplement Frequency 2 times a day     New PO Prescription Ordered? Yes        Other Orders    Obtain Weight Daily     Obtain Weight Ordered? No, recommended     Supplement Vitamin mineral supplement     Supplement Ordered? No, recommended     Labs K+;Phos;Mg++;Na+     Labs Ordered? No, recommended                    Education/Evaluation       Row Name 08/17/23 1246          Education    Education Will Instruct as appropriate        Monitor/Evaluation    Monitor Per protocol;Supplement intake;Weight;PO intake;Skin status;Symptoms;Pertinent labs                     Electronically signed by:  Irma Nugent RD  08/17/23 12:46 EDT

## 2023-08-17 NOTE — PROGRESS NOTES
[facility]    Name:  Gabi Dudley   Age:  78 y.o.  Sex:  female  :  1945  MRN:  [id number]   Visit Number:  [id number]  Admission Date:  2023  Date Of Service:  23  Primary Care Physician:  Paul Quinteros MD     LOS: 8 days :    Chief Complaint:      Anxiety     Subjective:    Patient was seen and examined at bedside today.  Patient laying in bed comfortably and sleeping when I entered the room, easily arousable to verbal stimuli.  Patient continues to improve clinically daily.  She denies any complaints today.  Patient tolerating p.o. well.  Dr. Isaac has evaluated the patient and removed 3 staples today.  Recommended holding a dose of Eliquis.  Patient continues to be noncompliant with her BiPAP however has been agreeable to it over the past couple hours on the time of my evaluation.  Vitals has been stable and afebrile.    Hospital Course:    Patient is a chronically ill 78-year-old female history significant for hypertension, hyperlipidemia, depression/anxiety and CAD who presents to the emergency room with 3 to 4 days of progressively worsening abdominal pain.  Patient describes the pain as intermittent and sharp, diffuse across her abdomen.  Patient has chronic constipation so cannot recall her last bowel movement.  Admits to some mild nausea, denies vomiting.  Patient does have a history of small bowel obstruction which required colectomy .  States pain is similar but not as severe as previous episode.  Nothing makes it better or worse.  Patient does admit to still passing gas.    Upon arrival to the ER, patient afebrile hemodynamically stable and nonhypoxic.  CMP unremarkable except for glucose 142.  Lactate lipase normal.  WBC 13, hemoglobin hematocrit at baseline, platelets 112.  UA negative for urinary tract infection.  CT abdomen pelvis revealed mid small bowel dilation worrisome for partial small bowel obstruction.  Dr. Isaac  agreed to consult hospital service asked to admit.    Hospital course:  -8/14/2023 patient continues to have episodes of agitation please see Dr. Dewitt note from overnight.  Currently comfortable on the BiPAP.  Still with NG output about 500 overnight per Gio the night nurse.  Discussed with Dr. Poe and Dr. Isaac. D/w Fraida her daughter. D/w Pharmacy resuming meds. Numerous serotonergic meds. Reducing zoloft dose. Has had bm the last two days.    -8/13/2023: Patient had an episode of respiratory distress today.  Was trying to come off the BiPAP.  Had some upper airway sounds concerning for stridor.  Was given medications to help BiPAP synchrony including Haldol Benadryl Ativan.  ABG reviewed.  Following this her heart rate normalized from the 160s to the 80s went from atrial fibrillation back to sinus rhythm.  Went down for a CT which ruled out tracheal obstruction.  Updated Dr. Poe on these changes.  Discussed with family.    -8/12/2023: Saw patient at the bedside more awake conversant today.  Family had concerns addressed. Dr. Simon saw. MRI reviewed with family at bedside. Patient had increased alertness but also too increased agitation for which I added medication in hopes of controlling this. Not hematuria in martinez bag but good urine output gave 1 u prbc.    -8/11/2023 patient with disorientation this morning.  Decreased pain medicine dose ordered CT of the head which was negative for acute process.    -8/10/2023: Patient converted to sinus rhythm this morning.  Dr. Curry's note reviewed.  Was taken to the OR for adhesiolysis by Dr. Isaac with assist from Dr. Bowden last evening.  Currently n.p.o. with NG tube in place.    -8/9/2023 patient with RVR overnight was started on Cardizem but that did not help.  Persistent pain discussed with Dr. Isaac taking  to the OR.  Discussed with Dr. Curry who recommended amiodarone and discussed perioperative risk management with Dr. Isaac.  8/8/2023 Pain not well  controlled increased morphine dose from 1 mg to 2 mg    Review of Systems:     All systems were reviewed and negative except as mentioned in subjective, assessment and plan.    Vital Signs:    Temp:  [97.2 øF (36.2 øC)-99 øF (37.2 øC)] 99 øF (37.2 øC)  Heart Rate:  [] 72  Resp:  [17-22] 22  BP: ()/() 141/59    Intake and output:    I/O last 3 completed shifts:  In: 1407 [P.O.:1160; I.V.:247]  Out: 1775 [Urine:1775]  No intake/output data recorded.    Physical Examination:    General Appearance:  Alert and cooperative.  Chronically ill-appearing.  No acute distress.   Head:  Atraumatic and normocephalic.   Eyes: Conjunctivae and sclerae normal, no icterus. No pallor.   Throat: No oral lesions, no thrush, oral mucosa moist.   Neck: Supple, trachea midline, no thyromegaly.   Lungs:   Breath sounds heard bilaterally equally.  Bilateral wheezing.  No crackles.  Prolonged expiration.  Decreased air movement bilaterally.    Heart:  Normal S1 and S2, no murmur, no gallop, no rub. No JVD.   Abdomen:   Normal bowel sounds, no masses, no organomegaly. Soft, nontender, nondistended, no rebound tenderness.  Midline incision appears clean with dry dressing, nontender to palpation.  No evidence of infection or bleeding.   Extremities: Supple, no edema, no cyanosis, no clubbing.   Skin: No bleeding or rash.   Neurologic: Alert and oriented x 3. No facial asymmetry. Moves all four limbs. No tremors.      Laboratory results:    Results from last 7 days   Lab Units 08/17/23  0434 08/16/23  0500 08/15/23  0422 08/14/23  1501 08/14/23  0427 08/13/23  1350 08/13/23  0352   SODIUM mmol/L 138 144 145  --  148*   < > 150*   POTASSIUM mmol/L 3.7 3.7 3.9   < > 3.4*   < > 3.3*   CHLORIDE mmol/L 100 103 107  --  107   < > 106   CO2 mmol/L 33.8* 32.9* 28.7  --  31.6*   < > 30.7*   BUN mg/dL 18 19 20  --  30*   < > 31*   CREATININE mg/dL 0.71 0.71 0.66  --  0.59   < > 0.72   CALCIUM mg/dL 8.4* 7.7* 7.7*  --  7.8*   < > 8.2*    BILIRUBIN mg/dL  --   --  0.6  --  0.4  --  0.4   ALK PHOS U/L  --   --  48  --  48  --  53   ALT (SGPT) U/L  --   --  22  --  22  --  21   AST (SGOT) U/L  --   --  22  --  21  --  19   GLUCOSE mg/dL 143* 99 81  --  125*   < > 150*    < > = values in this interval not displayed.       Results from last 7 days   Lab Units 08/17/23  0434 08/16/23  0500 08/15/23  0422   WBC 10*3/mm3 8.54 8.96 10.86*   HEMOGLOBIN g/dL 9.5* 9.9* 9.8*   HEMATOCRIT % 33.3* 34.3 34.0   PLATELETS 10*3/mm3 84* 202 199                   Recent Labs     08/13/23  1030 08/15/23  0715 08/16/23  0731   PHART 7.388 7.364 7.358   PIJ6WSR 49.9* 55.3* 65.5*   PO2ART 112.0* 306.0* 70.7*   PPS4BIU 30.1* 31.5* 36.8*   BASEEXCESS 4.2* 5.0* 9.6*        I have reviewed the patient's laboratory results.    Radiology results:    No radiology results from the last 24 hrs  I have reviewed the patient's radiology reports.    Medication Review:     I have reviewed the patient's active and prn medications.     Problem List:      Partial small bowel obstruction      Assessment:    Partial small bowel obstruction due to volvulus.    Acute on Chronic respiratory failure s/p intubation  History of colectomy due to obstruction in 2018  Paroxysmal A-fib with RVR   Hypertension  Hyperlipidemia  Depression/anxiety  GERD  Delirium    Plan:    Partial small bowel obstruction due to volvulus.    -S/p exploratory laparotomy, adhesiolysis and reduction of small bowel volvulus by Dr. Isaac on 8/9/2023.  -Dr. Isaac with general surgery following, appreciate recommendations.  -Plan for removing NG tube and advancing diet per surgery.  Speech evaluation ordered.  -Continue pain control  -PT/OT consulted.    Acute on Chronic respiratory failure s/p intubation  HAP  Severe COPD  -Pulmonology following, appreciate their recommendations.  -Continue supplemental oxygen and NIV therapy, patient is currently on 2 to 3 L through nasal cannula.   -Switched to p.o. prednisone x3  days  -Bronchodilators  -Given Diuril, currently off diuretics  -Continuing IV Zosyn antibiotics for HAP x5 days.   - Due to significant COPD and history of smoking, need to be followed outpatient with repeat CT in 8-12 weeks or so.    Paroxysmal A-fib with RVR   -Cardiology consulted and evaluated the patient.  -Was on therapeutic Lovenox, will switch her back to Eliquis today.  -Continue po amiodarone, Lopressor and Cardizem    Hypertension  -Continue antihypertensives  -as needed hydralazine and labetalol.    Delirium, resolved  -Appears to be improved  -Neurology have evaluated the patient previously.   -Likely metabolic and due to being hospitalized and respiratory status.  -Continue Vistaril, Klonopin and Geodon as needed.    Anemia.    -Hemoglobin has remained stable status post transfusion  -Continue close monitoring  -Hematuria resolved.  Suspect was traumatic due to agitation and pulling at lines.      -Further orders as indicated per clinical course.  -Transferred patient to telemetry today.    DVT Prophylaxis: Eliquis  Code Status: Full  Diet: Advance to regular as tolerated  Discharge Plan: To be determined, likely 1 to 2 days in the hospital then to [facility].    Tom Keyes MD  08/17/23  09:54 EDT    Dictated utilizing Dragon dictation.

## 2023-08-17 NOTE — PROGRESS NOTES
Malnutrition Severity Assessment    Patient Name:  Gabi Dudley  YOB: 1945  MRN: [id number]  Admit Date:  8/7/2023    Patient meets criteria for : Moderate (non-severe) Malnutrition    Comments:   Pt w/ moderate malnutrition based on NFPE findings and pt NPO x 8 days until diet advanced on 8/15. Pt reports she does not like boost supplements and would prefer magic cup. RD to follow-up and available PRN.     Malnutrition Severity Assessment  Malnutrition Type: Acute Disease or Injury - Related Malnutrition  Malnutrition Type (last 8 hours)       Malnutrition Severity Assessment       Row Name 08/17/23 1609       Malnutrition Severity Assessment    Malnutrition Type Acute Disease or Injury - Related Malnutrition      Row Name 08/17/23 1609       Insufficient Energy Intake     Insufficient Energy Intake  < or equal to 50% of est. energy requirement for > or equal to 5d)      Row Name 08/17/23 1609       Muscle Loss    Temple Region None    Clavicle Bone Region None    Acromion Bone Region Moderate - acromion may slightly protrude    Scapular Bone Region Moderate - mild depression, bones may show slightly    Dorsal Hand Region None    Patellar Region None    Anterior Thigh Region None    Posterior Calf Region None      Row Name 08/17/23 1609       Fat Loss    Orbital Region  None    Upper Arm Region Moderate - some fat tissue, not ample    Thoracic & Lumbar Region None      Row Name 08/17/23 1609       Criteria Met (Must meet criteria for severity in at least 2 of these categories: M Wasting, Fat Loss, Fluid, Secondary Signs, Wt. Status, Intake)    Patient meets criteria for  Moderate (non-severe) Malnutrition                    Electronically signed by:  Sergey Arredondo RD  08/17/23 16:11 EDT

## 2023-08-17 NOTE — CASE MANAGEMENT/SOCIAL WORK
Case Management/Social Work    Patient Name:  Gabi Dudley  YOB: 1945  MRN: [id number]  Admit Date:  8/7/2023        LIBIA spoke with Magaly Chung at [facility] and Pt.has been approved to discharge there. LIBIA/KRYSTAL will continue to follow for discharge planning.     01:15 PM: LIBIA spoke with Pt. Daughter on the phone to explain that Pt. Will discharge to [facility] tomorrow and it would likely be before noon. LIBIA/KRYSTAL will continue to follow for discharge planning.       Electronically signed by:  Davian Celeste  08/17/23 09:07 EDT

## 2023-08-17 NOTE — PLAN OF CARE
Goal Outcome Evaluation:      Vitals stable.  Continues to refuse bipap machine despite education done this shift per myself and RT.  Minimal pain associated with coughing and turning.  Tylenol given.

## 2023-08-18 ENCOUNTER — READMISSION MANAGEMENT (OUTPATIENT)
Dept: CALL CENTER | Facility: HOSPITAL | Age: 78
End: 2023-08-18
Payer: MEDICARE

## 2023-08-18 VITALS
TEMPERATURE: 97.8 F | WEIGHT: 166.01 LBS | RESPIRATION RATE: 18 BRPM | SYSTOLIC BLOOD PRESSURE: 131 MMHG | HEIGHT: 65 IN | HEART RATE: 67 BPM | BODY MASS INDEX: 27.66 KG/M2 | DIASTOLIC BLOOD PRESSURE: 72 MMHG | OXYGEN SATURATION: 99 %

## 2023-08-18 PROBLEM — E44.0 MODERATE MALNUTRITION: Status: ACTIVE | Noted: 2023-08-18

## 2023-08-18 LAB
ANION GAP SERPL CALCULATED.3IONS-SCNC: 7.9 MMOL/L (ref 5–15)
BUN SERPL-MCNC: 23 MG/DL (ref 8–23)
BUN/CREAT SERPL: 26.1 (ref 7–25)
CALCIUM SPEC-SCNC: 8.7 MG/DL (ref 8.6–10.5)
CHLORIDE SERPL-SCNC: 101 MMOL/L (ref 98–107)
CO2 SERPL-SCNC: 34.1 MMOL/L (ref 22–29)
CREAT SERPL-MCNC: 0.88 MG/DL (ref 0.57–1)
DEPRECATED RDW RBC AUTO: 60.4 FL (ref 37–54)
EGFRCR SERPLBLD CKD-EPI 2021: 68.2 ML/MIN/1.73
ERYTHROCYTE [DISTWIDTH] IN BLOOD BY AUTOMATED COUNT: 20.5 % (ref 12.3–15.4)
GLUCOSE SERPL-MCNC: 110 MG/DL (ref 65–99)
HCT VFR BLD AUTO: 29.4 % (ref 34–46.6)
HGB BLD-MCNC: 8.7 G/DL (ref 12–15.9)
MCH RBC QN AUTO: 24.2 PG (ref 26.6–33)
MCHC RBC AUTO-ENTMCNC: 29.6 G/DL (ref 31.5–35.7)
MCV RBC AUTO: 81.9 FL (ref 79–97)
NT-PROBNP SERPL-MCNC: 1444 PG/ML (ref 0–1800)
PLATELET # BLD AUTO: 214 10*3/MM3 (ref 140–450)
PMV BLD AUTO: 10.8 FL (ref 6–12)
POTASSIUM SERPL-SCNC: 3.2 MMOL/L (ref 3.5–5.2)
RBC # BLD AUTO: 3.59 10*6/MM3 (ref 3.77–5.28)
SODIUM SERPL-SCNC: 143 MMOL/L (ref 136–145)
WBC NRBC COR # BLD: 11.33 10*3/MM3 (ref 3.4–10.8)

## 2023-08-18 PROCEDURE — 99291 CRITICAL CARE FIRST HOUR: CPT | Performed by: INTERNAL MEDICINE

## 2023-08-18 PROCEDURE — 94799 UNLISTED PULMONARY SVC/PX: CPT

## 2023-08-18 PROCEDURE — 94761 N-INVAS EAR/PLS OXIMETRY MLT: CPT

## 2023-08-18 PROCEDURE — 85027 COMPLETE CBC AUTOMATED: CPT | Performed by: FAMILY MEDICINE

## 2023-08-18 PROCEDURE — 99024 POSTOP FOLLOW-UP VISIT: CPT | Performed by: SURGERY

## 2023-08-18 PROCEDURE — 63710000001 PREDNISONE PER 1 MG: Performed by: INTERNAL MEDICINE

## 2023-08-18 PROCEDURE — 99239 HOSP IP/OBS DSCHRG MGMT >30: CPT | Performed by: FAMILY MEDICINE

## 2023-08-18 PROCEDURE — 94664 DEMO&/EVAL PT USE INHALER: CPT

## 2023-08-18 PROCEDURE — 25010000002 FUROSEMIDE PER 20 MG: Performed by: INTERNAL MEDICINE

## 2023-08-18 PROCEDURE — 94660 CPAP INITIATION&MGMT: CPT

## 2023-08-18 PROCEDURE — 80048 BASIC METABOLIC PNL TOTAL CA: CPT | Performed by: FAMILY MEDICINE

## 2023-08-18 PROCEDURE — 83880 ASSAY OF NATRIURETIC PEPTIDE: CPT | Performed by: INTERNAL MEDICINE

## 2023-08-18 RX ORDER — CLONAZEPAM 0.5 MG/1
0.5 TABLET ORAL DAILY
Qty: 3 TABLET | Refills: 0 | Status: SHIPPED | OUTPATIENT
Start: 2023-08-18 | End: 2023-08-21

## 2023-08-18 RX ORDER — POTASSIUM CHLORIDE 20 MEQ/1
20 TABLET, EXTENDED RELEASE ORAL DAILY
Qty: 3 TABLET | Refills: 0 | Status: SHIPPED | OUTPATIENT
Start: 2023-08-18 | End: 2023-08-21

## 2023-08-18 RX ORDER — FUROSEMIDE 10 MG/ML
40 INJECTION INTRAMUSCULAR; INTRAVENOUS DAILY
Status: DISCONTINUED | OUTPATIENT
Start: 2023-08-18 | End: 2023-08-18 | Stop reason: HOSPADM

## 2023-08-18 RX ORDER — POTASSIUM CHLORIDE 20 MEQ/1
40 TABLET, EXTENDED RELEASE ORAL EVERY 4 HOURS
Status: COMPLETED | OUTPATIENT
Start: 2023-08-18 | End: 2023-08-18

## 2023-08-18 RX ORDER — PREDNISONE 20 MG/1
40 TABLET ORAL
Qty: 2 TABLET | Refills: 0 | Status: CANCELLED | OUTPATIENT
Start: 2023-08-18 | End: 2023-08-19

## 2023-08-18 RX ORDER — OXYCODONE AND ACETAMINOPHEN 7.5; 325 MG/1; MG/1
1 TABLET ORAL EVERY 6 HOURS PRN
Qty: 12 TABLET | Refills: 0 | Status: SHIPPED | OUTPATIENT
Start: 2023-08-18 | End: 2023-08-21

## 2023-08-18 RX ORDER — POTASSIUM CHLORIDE 20 MEQ/1
20 TABLET, EXTENDED RELEASE ORAL DAILY
Status: DISCONTINUED | OUTPATIENT
Start: 2023-08-18 | End: 2023-08-18 | Stop reason: HOSPADM

## 2023-08-18 RX ORDER — AMIODARONE HYDROCHLORIDE 200 MG/1
200 TABLET ORAL
Start: 2023-08-18

## 2023-08-18 RX ORDER — ACETAZOLAMIDE 500 MG/5ML
250 INJECTION, POWDER, LYOPHILIZED, FOR SOLUTION INTRAVENOUS DAILY
Status: DISCONTINUED | OUTPATIENT
Start: 2023-08-18 | End: 2023-08-18 | Stop reason: HOSPADM

## 2023-08-18 RX ORDER — FUROSEMIDE 40 MG/1
40 TABLET ORAL DAILY
Qty: 3 TABLET | Refills: 0
Start: 2023-08-18 | End: 2023-08-21

## 2023-08-18 RX ADMIN — AMIODARONE HYDROCHLORIDE 200 MG: 200 TABLET ORAL at 09:05

## 2023-08-18 RX ADMIN — DILTIAZEM HYDROCHLORIDE 60 MG: 30 TABLET, FILM COATED ORAL at 11:01

## 2023-08-18 RX ADMIN — TIOTROPIUM BROMIDE INHALATION SPRAY 2 PUFF: 3.12 SPRAY, METERED RESPIRATORY (INHALATION) at 06:49

## 2023-08-18 RX ADMIN — ATORVASTATIN CALCIUM 20 MG: 20 TABLET, FILM COATED ORAL at 09:05

## 2023-08-18 RX ADMIN — CLONAZEPAM 0.5 MG: 0.5 TABLET ORAL at 09:05

## 2023-08-18 RX ADMIN — METOPROLOL TARTRATE 12.5 MG: 25 TABLET, FILM COATED ORAL at 09:05

## 2023-08-18 RX ADMIN — BUDESONIDE AND FORMOTEROL FUMARATE DIHYDRATE 2 PUFF: 160; 4.5 AEROSOL RESPIRATORY (INHALATION) at 06:49

## 2023-08-18 RX ADMIN — PREDNISONE 40 MG: 20 TABLET ORAL at 09:05

## 2023-08-18 RX ADMIN — FLUTICASONE PROPIONATE 2 SPRAY: 50 SPRAY, METERED NASAL at 09:15

## 2023-08-18 RX ADMIN — DULOXETINE HYDROCHLORIDE 60 MG: 30 CAPSULE, DELAYED RELEASE ORAL at 09:04

## 2023-08-18 RX ADMIN — NYSTATIN 500000 UNITS: 100000 SUSPENSION ORAL at 09:07

## 2023-08-18 RX ADMIN — DILTIAZEM HYDROCHLORIDE 60 MG: 30 TABLET, FILM COATED ORAL at 06:45

## 2023-08-18 RX ADMIN — POTASSIUM CHLORIDE 40 MEQ: 1500 TABLET, EXTENDED RELEASE ORAL at 06:45

## 2023-08-18 RX ADMIN — OXYCODONE HYDROCHLORIDE AND ACETAMINOPHEN 1 TABLET: 7.5; 325 TABLET ORAL at 03:19

## 2023-08-18 RX ADMIN — ACETAMINOPHEN 650 MG: 325 TABLET, FILM COATED ORAL at 09:14

## 2023-08-18 RX ADMIN — Medication 5 MG: at 02:08

## 2023-08-18 RX ADMIN — NYSTATIN 500000 UNITS: 100000 SUSPENSION ORAL at 11:01

## 2023-08-18 RX ADMIN — CETIRIZINE HYDROCHLORIDE 10 MG: 10 TABLET, FILM COATED ORAL at 09:05

## 2023-08-18 RX ADMIN — FUROSEMIDE 40 MG: 10 INJECTION, SOLUTION INTRAMUSCULAR; INTRAVENOUS at 10:59

## 2023-08-18 RX ADMIN — POTASSIUM CHLORIDE 40 MEQ: 1500 TABLET, EXTENDED RELEASE ORAL at 11:01

## 2023-08-18 NOTE — PLAN OF CARE
Goal Outcome Evaluation: pt. Very drowsy this morning and wore bipap for 2.5 hrs. Was a/o x4 this afternoon. Abd. Dressing monitored and vitals stable. Will continue to monitor.

## 2023-08-18 NOTE — DISCHARGE INSTRUCTIONS
-Follow-up with PCP in 2 to 3 days for recheck and continued care.  -Follow-up with cardiology as scheduled  -Follow-up with pulmonology as an outpatient as scheduled.

## 2023-08-18 NOTE — PLAN OF CARE
Goal Outcome Evaluation:      Vitals stable.  No more abdominal bleeding and pt has voided of bowel and urine this shift.

## 2023-08-18 NOTE — DISCHARGE SUMMARY
[facility]   DISCHARGE SUMMARY      Name:  Gabi Dudley   Age:  78 y.o.  Sex:  female  :  1945  MRN:  [id number]   Visit Number:  [id number]    Admission Date:  2023  Date of Discharge:  2023  Primary Care Physician:  Paul Quinteros MD    Important issues to note:    -wet-to-dry abdominal wound dressing change daily.  -Patient discharged to [facility] for rehab  -Follow-up with PCP, pulmonology, cardiology and general surgery    Discharge Diagnoses:     Partial small bowel obstruction due to volvulus.    Acute on Chronic respiratory failure s/p intubation  History of colectomy due to obstruction in 2018  Paroxysmal A-fib with RVR   Hypertension  Hyperlipidemia  Depression/anxiety  GERD  Delirium    Problem List:     Active Hospital Problems    Diagnosis  POA    **Partial small bowel obstruction [K56.600]  Yes    Moderate malnutrition [E44.0]  Yes      Resolved Hospital Problems   No resolved problems to display.     Presenting Problem:    Chief Complaint   Patient presents with    Abdominal Pain      Consults:     Consulting Physician(s)       Provider Relationship Specialty    Jamal Simon MD Consulting Physician Neurology    Herbert Poe MD Consulting Physician Pulmonary Disease    Bianca Isaac DO Consulting Physician General Surgery          Procedures Performed:    Procedure(s):  LAPAROTOMY EXPLORATORY WITH LYIS OF ADHESIONS AND  CENTRAL LINE INSERTION    History of presenting illness/Hospital Course:    Patient is a chronically ill 78-year-old female history significant for hypertension, hyperlipidemia, depression/anxiety and CAD who presents to the emergency room with 3 to 4 days of progressively worsening abdominal pain.  Patient describes the pain as intermittent and sharp, diffuse across her abdomen.  Patient has chronic constipation so cannot recall her last bowel movement.  Admits to some mild nausea, denies vomiting.  Patient does  have a history of small bowel obstruction which required colectomy 2018.  States pain is similar but not as severe as previous episode.  Nothing makes it better or worse.  Patient does admit to still passing gas.     Upon arrival to the ER, patient afebrile hemodynamically stable and nonhypoxic.  CMP unremarkable except for glucose 142.  Lactate lipase normal.  WBC 13, hemoglobin hematocrit at baseline, platelets 112.  UA negative for urinary tract infection.  CT abdomen pelvis revealed mid small bowel dilation worrisome for partial small bowel obstruction.  Dr. Isaac agreed to consult hospital service asked to admit.     Patient was admitted, she underwent an exploratory laparotomy, adhesiolysis and reduction of small bowel volvulus by Dr. Isaac on 8/9/2023.  Patient went into A-fib with RVR and cardiology was consulted, cardiology started patient on amiodarone.  Patient had delirium while hospitalized with neurology consulted and were following.  Her delirium all improved and resolved after that.  Pulmonology were consulted for her respiratory failure and chronic COPD.  Patient was recovering after that and was discharged to [facility] for rehab.    Partial small bowel obstruction due to volvulus.,  Improved  -S/p exploratory laparotomy, adhesiolysis and reduction of small bowel volvulus by Dr. Isaac on 8/9/2023.  -General surgery following.  -Tolerated p.o. well.  -Continued pain control  -PT/OT consulted.     Acute on Chronic respiratory failure s/p intubation, improved  HAP, resolved  Severe COPD, improved  -Pulmonology following, appreciate their recommendations.  -Continue supplemental oxygen and NIV therapy, patient is currently on 2 to 3 L through nasal cannula.   -Switched to p.o. prednisone x3 days  -Bronchodilators  -Given Diuril, and was discharged on 3 days of Lasix.  -Finished 5 days of IV Zosyn.  - Due to significant COPD and history of smoking, need to be followed outpatient with repeat CT in  8-12 weeks or so.  Instructed on following up with pulmonology.  Repeat CT scan ordered.     Paroxysmal A-fib with RVR, improved  -Cardiology consulted and evaluated the patient.  -Continued on Eliquis  -Continued po amiodarone, Lopressor and Cardizem     Hypertension  -Continue antihypertensives     Delirium, resolved  -Appears to be improved  -Neurology have evaluated the patient previously.   -Likely metabolic and due to being hospitalized and respiratory status.  -Continue Vistaril, Klonopin and Geodon as needed.     Anemia.    -Hemoglobin has remained stable status post transfusion  -Continue close monitoring  -Hematuria resolved.  Suspect was traumatic due to agitation and pulling at lines.     Patient was seen and examined on day of discharge.  Patient sitting up comfortably in bed with no distress.  Patient on 3 L through nasal cannula which is her baseline oxygen requirement.  Patient appears at baseline, fully awake, alert and oriented.  Patient expressed being thankful for all the care she has received and feels ready to go to [facility].  Patient denies any pain or discomfort. Patient with no abdominal pain, chest pain or shortness of breath. She has been tolerating p.o. well and having good bowel movements. Patient denies any acute complaints today. General surgery Dr. Moses cleared patient for discharge with daily wound dressing changes and follow-up with Dr. Isaac in the office.  Patient instructed in length about management and plan.  Patient counseled extensively about using her trilogy and following up closely with pulmonology in the office as planned.  Patient to follow-up with her PCP in 2 to 3 days for recheck and continued care.  Patient verbalized understanding and agreeable to plan.  All questions were answered to her satisfaction.    Vital Signs:    Temp:  [97.7 øF (36.5 øC)-98.9 øF (37.2 øC)] 97.8 øF (36.6 øC)  Heart Rate:  [] 66  Resp:  [14-20] 14  BP: ()/()  115/75    Physical Exam:    General Appearance:  Alert and cooperative.  Chronically ill-appearing.  No acute distress.  Appears more energetic today.   Head:  Atraumatic and normocephalic.   Eyes: Conjunctivae and sclerae normal, no icterus. No pallor.   Ears:  Ears with no abnormalities noted.   Throat: No oral lesions, no thrush, oral mucosa moist.   Neck: Supple, trachea midline, no thyromegaly.   Back:   No kyphoscoliosis present. No tenderness to palpation.   Lungs:   Breath sounds heard bilaterally equally.  No crackles or wheezing. Prolonged expiration. Decreased air movement bilaterally.  On 3 L per nasal cannula (baseline).   Heart:  Normal S1 and S2, no murmur, no gallop, no rub. No JVD.   Abdomen:   Normal bowel sounds, no masses, no organomegaly. Soft, nontender, nondistended, no rebound tenderness. Abdominal wall hematoma is resolving.  Dry clean dressing over the abdominal wound.  No signs of infection or bleeding.   Extremities: Supple, no edema, no cyanosis, no clubbing.   Pulses: Pulses palpable bilaterally.   Skin: No bleeding or rash.   Neurologic: Alert and oriented x 3. No facial asymmetry. Moves all four limbs. No tremors.     Pertinent Lab Results:     Results from last 7 days   Lab Units 08/18/23  0456 08/17/23  0434 08/16/23  0500 08/15/23  0422 08/14/23  1501 08/14/23  0427 08/13/23  1350 08/13/23  0352   SODIUM mmol/L 143 138 144 145  --  148*   < > 150*   POTASSIUM mmol/L 3.2* 3.7 3.7 3.9   < > 3.4*   < > 3.3*   CHLORIDE mmol/L 101 100 103 107  --  107   < > 106   CO2 mmol/L 34.1* 33.8* 32.9* 28.7  --  31.6*   < > 30.7*   BUN mg/dL 23 18 19 20  --  30*   < > 31*   CREATININE mg/dL 0.88 0.71 0.71 0.66  --  0.59   < > 0.72   CALCIUM mg/dL 8.7 8.4* 7.7* 7.7*  --  7.8*   < > 8.2*   BILIRUBIN mg/dL  --   --   --  0.6  --  0.4  --  0.4   ALK PHOS U/L  --   --   --  48  --  48  --  53   ALT (SGPT) U/L  --   --   --  22  --  22  --  21   AST (SGOT) U/L  --   --   --  22  --  21  --  19   GLUCOSE  mg/dL 110* 143* 99 81  --  125*   < > 150*    < > = values in this interval not displayed.     Results from last 7 days   Lab Units 08/18/23  0456 08/17/23  0434 08/16/23  0500   WBC 10*3/mm3 11.33* 8.54 8.96   HEMOGLOBIN g/dL 8.7* 9.5* 9.9*   HEMATOCRIT % 29.4* 33.3* 34.3   PLATELETS 10*3/mm3 214 84* 202             Results from last 7 days   Lab Units 08/18/23  0456   PROBNP pg/mL 1,444.0             Results from last 7 days   Lab Units 08/17/23  0958   PH, ARTERIAL pH units 7.452*   PO2 ART mm Hg 88.0   PCO2, ARTERIAL mm Hg 55.6*   HCO3 ART mmol/L 38.8*           Pertinent Radiology Results:    Imaging Results (All)       Procedure Component Value Units Date/Time    XR Chest 1 View [[id number]] Collected: 08/15/23 0800     Updated: 08/15/23 0803    Narrative:      PROCEDURE: XR CHEST 1 VW-     HISTORY: Resp Failure.; K56.600-Partial intestinal obstruction,  unspecified as to cause; I48.91-Unspecified atrial fibrillation     COMPARISON: 2 days prior.     FINDINGS: The heart is mildly enlarged, stable. Patient is rotated to  the right. Right internal jugular catheter and NG tube are stable in  position.. Mild interstitial disease noted bilaterally, stable. There is  mild left basilar atelectasis or infiltrate similar to prior exam.. The  mediastinum is unremarkable. There is no pneumothorax.  There are no  acute osseous abnormalities.       Impression:      Stable chest..                 This report was signed and finalized on 8/15/2023 8:00 AM by Angeles Collins MD.       XR Neck Soft Tissue [[id number]] Collected: 08/13/23 1206     Updated: 08/13/23 1210    Narrative:      Neck soft tissue     HISTORY: Stridor     FINDINGS: 2 views of the neck were obtained. The base of the neck is  obscured by the shoulders on the lateral view. A right jugular deep line  is present. A nasogastric tube is also noted. The visualized airway  appears normal. The epiglottis appears normal.       Impression:      No focal airway  abnormality identified.     This report was signed and finalized on 8/13/2023 12:08 PM by Maximo Epps MD.       XR Chest 1 View [[id number]] Collected: 08/13/23 1148     Updated: 08/13/23 1150    Narrative:      PROCEDURE: XR CHEST 1 VW-     HISTORY: sob; K56.600-Partial intestinal obstruction, unspecified as to  cause; I48.91-Unspecified atrial fibrillation        COMPARISON: August 12, 2023.     FINDINGS: Cardiomegaly is noted. A nasogastric tube and right jugular  deep line remain in place. There are persistent mild left lung base  opacities, favor atelectasis. There is no pneumothorax. The bony thorax  in intact.        Impression:      Stable mild left lung base opacities, favor atelectasis.           This report was signed and finalized on 8/13/2023 11:48 AM by Maximo Epps MD.       CT Soft Tissue Neck Without Contrast [[id number]] Collected: 08/13/23 1142     Updated: 08/13/23 1148    Narrative:      PROCEDURE: CT SOFT TISSUE NECK WO CONTRAST-     HISTORY: Epiglottitis or tonsillitis suspected; K56.600-Partial  intestinal obstruction, unspecified as to cause; I48.91-Unspecified  atrial fibrillation     TECHNIQUE:  Thin section axial CT images were obtained through the neck  without intravenous contrast administration. Sagittal and coronal  reformatted images were also obtained. This study was performed with  techniques to keep radiation doses as low as reasonably achievable,  (ALARA). Individualized dose reduction techniques using automated  exposure control or adjustment of mA and/or kV according to the patient  size were employed.     COMPARISON: None.     FINDINGS: A feeding tube and right jugular deep line are present. The  nasopharynx and oropharynx have an unremarkable appearance. The  epiglottis has an unremarkable appearance. The hypopharynx has an  unremarkable appearance. There is some motion at the level of the larynx  which otherwise appears normal. The thyroid gland is  unremarkable.  Moderate bilateral carotid artery calcifications are noted. Mild mucosal  thickening is seen of the right sphenoid sinus. Moderate degenerative  changes are noted of the cervical spine.          Impression:      No CT evidence of epiglottitis or tonsillitis identified..                    This report was signed and finalized on 8/13/2023 11:46 AM by Maximo Epps MD.       CT Chest Without Contrast Diagnostic [[id number]] Collected: 08/13/23 1139     Updated: 08/13/23 1144    Narrative:      PROCEDURE: CT CHEST WO CONTRAST DIAGNOSTIC-     HISTORY: Abnormal xray - lung opacity/opacities; K56.600-Partial  intestinal obstruction, unspecified as to cause; I48.91-Unspecified  atrial fibrillation     COMPARISON: July 20, 2023 CT     FINDINGS: Axial CT images of the chest were obtained without contrast.  Coronal and sagittal reformatted images were also obtained. This study  was performed with techniques to keep radiation doses as low as  reasonably achievable, (ALARA). Individualized dose reduction techniques  using automated exposure control or adjustment of mA and/or kV according  to the patient size were employed.     A right jugular deep line and feeding tube are present. There is no  evidence of mediastinal mass or adenopathy. No axillary mass or  adenopathy is identified. Mild emphysema is noted. There are new small  nodular opacities in the posterior left upper lobe which are likely  inflammatory. Small bilateral pleural effusions are seen with mild  bibasilar atelectasis. No chest wall abnormality is identified. Limited  images of the upper abdomen reveal postoperative changes from  cholecystectomy. There are several small nonobstructing renal stones. A  5.5 cm cystic mass is seen at the lateral aspect of the right kidney  with calcification in its wall. A mildly complicated cyst is favored.  There is mild anasarca.          Impression:      New small nodular opacities in the posterior left upper  lobe  which are likely inflammatory.     Small pleural effusions with mild bibasilar atelectasis.     Cystic lateral right renal mass, favor a mildly complicated cyst.           This report was signed and finalized on 8/13/2023 11:42 AM by Maximo Epps MD.       XR Chest 1 View [[id number]] Collected: 08/12/23 1731     Updated: 08/12/23 1732    Narrative:      FINAL REPORT    CLINICAL HISTORY:  sob    FINDINGS:  A right IJ catheter is in good position.  An NG tube passes  below the diaphragm. The heart is normal in size. The  mediastinum is unremarkable. Prominent perihilar interstitial  changes are noted, likely secondary to vascular congestion.  There is no pneumothorax. Osseous structures unremarkable.      Impression:      Prominent interstitial changes are likely secondary to vascular  congestion.  Continued followup recommended.    Authenticated and Electronically Signed by Jasiel Browne MD  on 08/12/2023 05:31:09 PM    MRI Brain Without Contrast [[id number]] Collected: 08/11/23 1950     Updated: 08/11/23 1951    Narrative:      FINAL REPORT    TECHNIQUE:  Multiplanar imaging was performed of the brain without  gadolinium infusion.    CLINICAL HISTORY:  Acute right MCA stroke, AMS, RECENT SURGERY FOR SBO.    FINDINGS:  Diffusion-weighted images show no evidence of acute infarction.  The ventricles are dilated, but proportionate to the degree of  generalized atrophy. Periventricular and deep white matter  signal abnormality is consistent with changes of chronic small  vessel ischemia. There is no mass or shift of midline  structures. There is no intracranial hemorrhage. No significant  sinus or osseous abnormality is seen. Appropriate signal flow  voids are seen in the major intracranial vessels on T2-weighted  images.      Impression:      No acute intracranial abnormality. Specifically, no infarction  is identified on diffusion-weighted imaging.    Authenticated and Electronically Signed by Fidel Knapp  M.D. on  08/11/2023 07:50:48 PM    CT Head Without Contrast [[id number]] Collected: 08/11/23 0949     Updated: 08/11/23 0952    Narrative:      PROCEDURE: CT HEAD WO CONTRAST-     HISTORY: Mental status change, unknown cause; K56.600-Partial intestinal  obstruction, unspecified as to cause; I48.91-Unspecified atrial  fibrillation     COMPARISON: None.     TECHNIQUE: Multiple axial CT images were performed from the foramen  magnum to the vertex. Individualized dose reduction techniques using  automated exposure control or adjustment of mA and/or kV according to  the patient size were employed.      FINDINGS: There is moderate, age-appropriate generalized cerebral  atrophy. The ventricles are enlarged, appropriate for atrophy. There is  mild asymmetry of the frontal horns of the lateral ventricles. Mild  small vessel ischemic disease noted. There is no evidence of edema or  hemorrhage.  No masses are identified. No extra-axial fluid is seen. The  paranasal sinuses are unremarkable.       Impression:      Atrophy  without acute process.                    This report was signed and finalized on 8/11/2023 9:49 AM by Angeles Collins MD.       XR Chest 1 View [[id number]] Collected: 08/09/23 1513     Updated: 08/09/23 1520    Narrative:      PROCEDURE: XR CHEST 1 VW-     HISTORY: Line placement; K56.600-Partial intestinal obstruction,  unspecified as to cause; I48.91-Unspecified atrial fibrillation     COMPARISON: None.     FINDINGS: Nasogastric tube seen with the tip coursing below the level  the diaphragm. There is a new right IJ catheter with the tip terminate  in the mid SVC. The patient is rotated to the right. The heart is normal  in size. The mediastinum is unremarkable. There is stable mild  interstitial disease within the lungs. There is no pneumothorax.  There  are no acute osseous abnormalities.       Impression:      Stable mild interstitial disease.     Support tubes and lines as described.              Images  were reviewed, interpreted, and dictated by Dr. Angeles Collins MD  Transcribed by Mickey Perez PA-C.     This report was signed and finalized on 8/9/2023 3:17 PM by Angeles Collins MD.       XR Chest 1 View [[id number]] Collected: 08/09/23 1009     Updated: 08/09/23 1013    Narrative:      PROCEDURE: XR CHEST 1 VW-     HISTORY: wheezing; K56.600-Partial intestinal obstruction, unspecified  as to cause; I48.91-Unspecified atrial fibrillation     COMPARISON: July 30, 2023.     FINDINGS: The heart is enlarged, stable from the prior exam. NG tube has  been placed since the prior study and extends below the level the  diaphragm below the inferior extent of this image. Mild elevation of the  right hemidiaphragm noted. No acute infiltrate seen.. . The mediastinum  is unremarkable. There is no pneumothorax.  There are no acute osseous  abnormalities.       Impression:      Interval placement of NG tube extending below the level of  the diaphragm otherwise stable chest..                 This report was signed and finalized on 8/9/2023 10:11 AM by Angeles Collins MD.       CT Abdomen Pelvis With Contrast [[id number]] Collected: 08/1945     Updated: 08/08/23 1948    Narrative:      FINAL REPORT    TECHNIQUE:  Routine axial images through the abdomen and pelvis were  obtained following IV contrast administration.    CLINICAL HISTORY:  Small bowel obstruction FOLLOW UP FROM YESTERDAY ORAL CONTRAST  GIVEN VIA NG TUBE 6 HOURS PRIOR.    COMPARISON:  8/7/2023    FINDINGS:  Abdomen: The gallbladder has been removed.  Again seen are  bilateral renal cysts.  There is a 2.9 cm left adrenal nodule,  likely an adenoma in the absence of a known malignancy.  The  other solid abdominal organs and ureters are unremarkable. There  are moderately dilated multiple loops of proximal and mid small  bowel with collapsed distal small bowel consistent with a  high-grade small bowel obstruction.  No convincing passage of  contrast into the collapsed  distal small bowel.  There are  postoperative changes of the cecum, possibly an appendectomy.  There is left-sided diverticulosis.  The GI tract is otherwise  unremarkable.  Pelvis: The urinary bladder is unremarkable.  The  uterus and ovaries are not visualized.  There is no pelvic or  abdominal ascites, adenopathy or acute osseous abnormality.      Impression:      High-grade distal small bowel obstruction.    Authenticated and Electronically Signed by Fidel Knapp M.D. on  08/08/2023 07:47:16 PM    XR Abdomen KUB [[id number]] Collected: 08/08/23 0714     Updated: 08/08/23 0821    Narrative:      PROCEDURE: XR ABDOMEN KUB-     HISTORY: NG TUBE PLACEMENT; K56.600-Partial intestinal obstruction,  unspecified as to cause; I48.91-Unspecified atrial fibrillation     COMPARISON: None.     FINDINGS: Single view of the abdomen demonstrates a nasogastric tube  seen coursing below the diaphragm with the tip terminating in the distal  stomach/proximal duodenum. There is minimal right basilar atelectasis or  scarring. The heart is normal in size.       Impression:      NG tube coursing below diaphragm with the tip terminating  the distal stomach/proximal duodenum.              Images were reviewed, interpreted, and dictated by Dr. Angeles Collins MD  Transcribed by Mickey Perez PA-C.     This report was signed and finalized on 8/8/2023 8:18 AM by Angeles Collins MD.       CT Abdomen Pelvis With Contrast [[id number]] Collected: 08/07/23 1932     Updated: 08/07/23 1933    Narrative:      FINAL REPORT    TECHNIQUE:  Routine axial images through the abdomen and pelvis were  obtained following IV contrast administration.    CLINICAL HISTORY:  lower abd pain cramps, n/v, constipation, h/o obstruction feels  similar    COMPARISON:  3/22/2023    FINDINGS:  Abdomen: The gallbladder has been removed.  There is no change in the 2.9 cm left adrenal nodule.  In the absence of known malignancy this is most likely an adenoma.  Follow-up CT in 9 - 12  [facility] to confirm stability is recommended.  There are bilateral   renal cysts.  The solid abdominal organs and ureters are otherwise unremarkable.  There are new moderately dilated multiple loops of mid small bowel.  The distal small bowel is normal in caliber and a partial bowel obstruction is not excluded.  The site   and etiology of the obstruction is not apparent.  There are postoperative changes of the cecum.  The GI tract is otherwise unremarkable.  The appendix is not visualized and may have been removed.    Pelvis: The urinary bladder is unremarkable.  Uterus and ovaries are absent.  There is no pelvic or abdominal ascites, adenopathy or acute osseous abnormality.      Impression:      Mid small bowel dilation worrisome for partial bowel obstruction of uncertain etiology.    Stable adrenal nodule, consider follow-up CT to confirm stability.    Authenticated and Electronically Signed by Fidel Knapp M.D. on  08/07/2023 07:32:37 PM            Echo:    Results for orders placed in visit on 11/02/22    Adult Transthoracic Echo Complete W/ Cont if Necessary Per Protocol    Interpretation Summary  1.  Normal left ventricular size and systolic function, LVEF 60-65%.  2.  Mild concentric LVH.  3.  Normal LV diastolic filling pattern.  4.  Normal right ventricular size and systolic function.  5.  Mildly increased left atrial volume index.  6.  No significant valvular abnormalities.    Condition on Discharge:      Stable.    Code status during the hospital stay:    Code Status and Medical Interventions:   Ordered at: 08/07/23 2023     Code Status (Patient has no pulse and is not breathing):    CPR (Attempt to Resuscitate)     Medical Interventions (Patient has pulse or is breathing):    Full Support     Release to patient:    Routine Release     Discharge Disposition:    Home or Self Care    Discharge Medications:       Discharge Medications        New Medications        Instructions Start Date   amiodarone 200 MG  tablet  Commonly known as: PACERONE   200 mg, Oral, Every 24 Hours Scheduled      ferrous sulfate 324 (65 Fe) MG tablet delayed-release EC tablet   324 mg, Oral, Daily With Breakfast      furosemide 40 MG tablet  Commonly known as: Lasix   40 mg, Oral, Daily      metoprolol tartrate 25 MG tablet  Commonly known as: LOPRESSOR   12.5 mg, Oral, Every 12 Hours Scheduled      potassium chloride 20 MEQ CR tablet  Commonly known as: K-DUR,KLOR-CON   20 mEq, Oral, Daily             Continue These Medications        Instructions Start Date   albuterol sulfate  (90 Base) MCG/ACT inhaler  Commonly known as: PROVENTIL HFA;VENTOLIN HFA;PROAIR HFA   2 puffs, Inhalation, Every 4 Hours PRN      apixaban 5 MG tablet tablet  Commonly known as: Eliquis   5 mg, Oral, Every 12 Hours      atorvastatin 20 MG tablet  Commonly known as: LIPITOR   TAKE 1 TABLET BY MOUTH DAILY      Bremalorietri Aerosphere 160-9-4.8 MCG/ACT aerosol inhaler  Generic drug: Budeson-Glycopyrrol-Formoterol   2 puffs, Inhalation, 2 Times Daily, Rinse mouth out after use      cetirizine 10 MG tablet  Commonly known as: zyrTEC   10 mg, Oral, Daily      clonazePAM 0.5 MG tablet  Commonly known as: KlonoPIN   0.5 mg, Oral, Daily      dilTIAZem  MG 24 hr capsule  Commonly known as: CARDIZEM CD   240 mg, Oral, Daily      DULoxetine 60 MG capsule  Commonly known as: CYMBALTA   60 mg, Oral, Daily      fluticasone 50 MCG/ACT nasal spray  Commonly known as: FLONASE   2 sprays, Nasal, Daily      ipratropium-albuterol 0.5-2.5 mg/3 ml nebulizer  Commonly known as: DUO-NEB   USE 3 mL VIA NEBULIZER EVERY 4 HOURS AS NEEDED FOR WHEEZING      O2  Commonly known as: OXYGEN   2 L/min, Inhalation, As Needed      oxyCODONE-acetaminophen 7.5-325 MG per tablet  Commonly known as: PERCOCET   1 tablet, Oral, Every 6 Hours PRN      pantoprazole 40 MG EC tablet  Commonly known as: PROTONIX   40 mg, Oral, Daily      QUEtiapine 25 MG tablet  Commonly known as: SEROquel   12.5 mg, Oral,  Nightly      sertraline 100 MG tablet  Commonly known as: ZOLOFT   TAKE 1 & 1/2 TABLET BY MOUTH DAILY      sodium chloride 0.65 % nasal spray   2 sprays, Nasal, As Needed      traZODone 100 MG tablet  Commonly known as: DESYREL   1 qhs po prn      vitamin D3 125 MCG (5000 UT) capsule capsule   5,000 Units, Oral, Daily             Stop These Medications      metoprolol succinate XL 25 MG 24 hr tablet  Commonly known as: TOPROL-XL     predniSONE 10 MG tablet  Commonly known as: DELTASONE     predniSONE 20 MG tablet  Commonly known as: DELTASONE            Discharge Diet:   Cardiac    Activity at Discharge:   As tolerated    Follow-up Appointments:    Additional Instructions for the Follow-ups that You Need to Schedule       Ambulatory Referral to Disease State Management   As directed      Follow-up in 14-21 days after discharge    Order Comments: Follow-up in 14-21 days after discharge    To dept: [facility] [[id number]]   What program(s) are you referring for?: COPD   Follow-up needed: No        CT Chest Without Contrast Diagnostic   Sep 28, 2023      Does this exam require Wilner Bronch Protocol?: No   Release to patient: Routine Release               Contact information for follow-up providers       Paul Quinteros MD Follow up in 3 day(s).    Specialty: Internal Medicine  Contact information:  [address]  [address]  [place] [id number] [phone]               Bianca Isaac, DO Follow up in 1 week(s).    Specialty: General Surgery  Contact information:  [address]  [address]  [place] [id number] [phone]                       Contact information for after-discharge care       Destination       [facility] .    Service: Inpatient Rehabilitation  Contact information:  [address] [facility] [address] [phone]                                 Future Appointments   Date Time Provider Department Center   9/26/2023  3:30 PM Britt Farrar,  [facility] CD BG R [facility]   10/20/2023  3:00 PM RM 4 - CHAIR 1 BH [facility] OP INF [facility]   12/18/2023  2:45 PM Doris Juárez APRN [facility]     Test Results Pending at Discharge:           Tom Keyes MD  08/18/23  10:35 EDT    Time: I spent 39 minutes on this discharge activity which included: face-to-face encounter with the patient, reviewing the data in the system, coordination of the care with the nursing staff as well as consultants, documentation, and entering orders.     Dictated utilizing Dragon dictation.

## 2023-08-18 NOTE — PROGRESS NOTES
LOS: 9 days   Patient Care Team:  Paul Quinteros MD as PCP - General (Internal Medicine)  Sima Moses MD as Consulting Physician (General Surgery)  Taiwo Curry MD as Consulting Physician (Cardiology)  Soledad Stauffer, EFRAÍN as Ambulatory  ([facility])      Chief Complaint: Postop oratory laparotomy      Interval History: Patient doing well, tolerating a diet and urinating fine.  No abdominal pain just uncomfortable.  Wound looks good with no significant drainage or discharge from dressings.    Patient Complaints: None    History taken from: patient    Vital Signs  Temp:  [97.7 øF (36.5 øC)-98.9 øF (37.2 øC)] 97.8 øF (36.6 øC)  Heart Rate:  [] 69  Resp:  [17-22] 18  BP: ()/() 136/62    Physical Exam:     General Appearance:    Alert, cooperative, in no acute distress   Head:    Normocephalic, without obvious abnormality, atraumatic   Lungs:     Clear to auscultation,respirations regular, even and                  unlabored    Heart:    Regular rhythm and normal rate, normal S1 and S2, no            murmur, no gallop, no rub, no click   Abdomen:     Normal bowel sounds, no masses, no organomegaly, soft    and nondistended.  Wound looks good, minimal drainage around the dressing.    Extremities:   Moves all extremities well, no edema, no cyanosis, no             redness   Pulses:   Pulses palpable and equal bilaterally   Skin:   No bleeding, bruising or rash        Results Review:       Lab Results (last 24 hours)       Procedure Component Value Units Date/Time    Basic Metabolic Panel [[id number]]  (Abnormal) Collected: 08/18/23 0456    Specimen: Blood Updated: 08/18/23 0528     Glucose 110 mg/dL      BUN 23 mg/dL      Creatinine 0.88 mg/dL      Sodium 143 mmol/L      Potassium 3.2 mmol/L      Chloride 101 mmol/L      CO2 34.1 mmol/L      Calcium 8.7 mg/dL      BUN/Creatinine Ratio 26.1     Anion Gap 7.9 mmol/L      eGFR 68.2 mL/min/1.73     Narrative:      GFR Normal  >60  Chronic Kidney Disease <60  Kidney Failure <15    The GFR formula is only valid for adults with stable renal function between ages 18 and 70.    CBC (No Diff) [[id number]]  (Abnormal) Collected: 08/18/23 0456    Specimen: Blood Updated: 08/18/23 0520     WBC 11.33 10*3/mm3      RBC 3.59 10*6/mm3      Hemoglobin 8.7 g/dL      Hematocrit 29.4 %      MCV 81.9 fL      MCH 24.2 pg      MCHC 29.6 g/dL      RDW 20.5 %      RDW-SD 60.4 fl      MPV 10.8 fL      Platelets 214 10*3/mm3     Blood Gas, Arterial With Co-Ox [[id number]]  (Abnormal) Collected: 08/17/23 0958    Specimen: Arterial Blood Updated: 08/17/23 0958     Site Right Radial     Glenn's Test N/A     pH, Arterial 7.452 pH units      Comment: 83 Value above reference range        pCO2, Arterial 55.6 mm Hg      Comment: 83 Value above reference range        pO2, Arterial 88.0 mm Hg      HCO3, Arterial 38.8 mmol/L      Comment: 83 Value above reference range        Base Excess, Arterial 13.1 mmol/L      Comment: 83 Value above reference range        O2 Saturation, Arterial 96.5 %      Hematocrit, Blood Gas 27.7 %      Comment: 84 Value below reference range        Oxyhemoglobin 95.3 %      Methemoglobin 0.60 %      Carboxyhemoglobin 0.7 %      A-a DO2 55.6 mmHg      Barometric Pressure for Blood Gas 732 mmHg      Modality BiPap     FIO2 30 %      Ventilator Mode NA     Collected by cj     Comment: Meter: [id number]     :  MAY        pH, Temp Corrected --     pCO2, Temperature Corrected --     pO2, Temperature Corrected --                Assessment & Plan       Partial small bowel obstruction      Postop, doing well.  Abdominal wall hematoma is resolving nicely, no evidence of infection no cellulitis.  No purulent drainage.  Dressing changed at bedside.  Patient okay to be discharged to [facility] today.  Follow-up with Dr. Isaac as scheduled.      Sima Moses MD  08/18/23  09:20 EDT

## 2023-08-18 NOTE — PROGRESS NOTES
"  CC: Acute Respiratory Failure.  Acute encephalopathy    S: Currently in bed.  Much more awake and alert today. She was able to use the NIV device last night.  [place] that it was somewhat easier to tolerate.  1 concern about her abdominal wound.  Was also asking if she could walk around the bed.    ROS: Is complaining of some pain at the site of surgery.  Denies any shortness of breath but does admit to mild occasional cough.    O:Vital signs reviewed.   /62   Pulse 69   Temp 97.8 øF (36.6 øC) (Temporal)   Resp 18   Ht 165.1 cm (65\")   Wt 75.3 kg (166 lb 0.1 oz)   SpO2 94%   BMI 27.62 kg/mý     Temp (24hrs), Av.2 øF (36.8 øC), Min:97.7 øF (36.5 øC), Max:98.9 øF (37.2 øC)      I & Os reviewed.   Intake/Output         23 0700 - 23 0659 23 0700 - 23 0659    Intake (ml) 600 480    Output (ml) 350 --    Net (ml) 250 480    Last Weight 75.3 kg (166 lb 0.1 oz) --            Net IO Since Admission: 6,978.12 mL [23 0918]    General/Constitutional: Does not appear to be in any significant distress.  Eyes: PERRL. EOMI  Neck: Supple without JVD. No obvious masses noted.   Cardiovascular: S1 + S2.  Regular at this time with occasional irregular rhythm noted.  Lungs/Respiratory: Fair air entry noted.  No significant wheezing heard.  Bibasilar crackles noted.  GI/Abdomen: Abdominal incision with possible superficial dehiscence.  No obvious purulent drainage noted.  The exposed subcutaneous area appears to be, expectedly, erythematous.  Bowel sounds positive.  Assessment of organomegaly was limited because of concerns regarding wound.  Musculoskeletal/Extremities: Trace edema noted. Gait could not be assessed at this time, as the patient was laying in bed.   Neurologic: Was able to follow commands.  Answer questions appropriately.  Psych: AAOx3.  Appears in good spirits today  Skin: Appeared somewhat dry       Labs: Reviewed.   Results from last 7 days   Lab Units 23  0456 " 08/17/23  0434 08/16/23  0500 08/15/23  0422 08/14/23  0427   WBC 10*3/mm3 11.33* 8.54 8.96 10.86* 8.02   HEMOGLOBIN g/dL 8.7* 9.5* 9.9* 9.8* 9.3*   HEMATOCRIT % 29.4* 33.3* 34.3 34.0 32.3*   PLATELETS 10*3/mm3 214 84* 202 199 172   NEUTROPHIL % %  --   --  84.9*  --   --    NEUTROS ABS 10*3/mm3  --   --  7.60*  --   --    EOSINOPHIL % %  --   --  0.0*  --   --    EOS ABS 10*3/mm3  --   --  0.00  --   --    LYMPHOCYTE % %  --   --  6.0*  --   --    LYMPHS ABS 10*3/mm3  --   --  0.54*  --   --        Lab Results   Component Value Date    PROCALCITO 0.12 08/15/2023    PROCALCITO 0.11 08/09/2023    PROCALCITO 0.05 07/20/2023       No results found for: CRP    No results found for: SEDRATE    Lab Results   Component Value Date    PROBNP 10,558.0 (H) 08/15/2023    PROBNP 1,694.0 07/30/2023    PROBNP 1,543.0 07/20/2023       Results from last 7 days   Lab Units 08/18/23  0456 08/17/23  0434 08/16/23  0500 08/15/23  0422 08/14/23  1501 08/14/23  0427 08/13/23  1350 08/13/23  0352   SODIUM mmol/L 143 138 144 145  --  148*   < > 150*   POTASSIUM mmol/L 3.2* 3.7 3.7 3.9   < > 3.4*   < > 3.3*   CHLORIDE mmol/L 101 100 103 107  --  107   < > 106   CO2 mmol/L 34.1* 33.8* 32.9* 28.7  --  31.6*   < > 30.7*   BUN mg/dL 23 18 19 20  --  30*   < > 31*   CREATININE mg/dL 0.88 0.71 0.71 0.66  --  0.59   < > 0.72   CALCIUM mg/dL 8.7 8.4* 7.7* 7.7*  --  7.8*   < > 8.2*   ANION GAP mmol/L 7.9 4.2* 8.1 9.3  --  9.4   < > 13.3   BILIRUBIN mg/dL  --   --   --  0.6  --  0.4  --  0.4   ALK PHOS U/L  --   --   --  48  --  48  --  53   ALT (SGPT) U/L  --   --   --  22  --  22  --  21   AST (SGOT) U/L  --   --   --  22  --  21  --  19   GLUCOSE mg/dL 110* 143* 99 81  --  125*   < > 150*   TOTAL PROTEIN g/dL  --   --   --  5.4*  --  5.4*  --  5.3*   ALBUMIN g/dL  --   --   --  3.4*  --  3.5  --  3.2*    < > = values in this interval not displayed.       Results from last 7 days   Lab Units 08/13/23  0139 08/12/23  1541 08/12/23  0412   MAGNESIUM  mg/dL  --   --  2.3   PHOSPHORUS mg/dL 4.7* 1.5* 0.3*             Lab Results   Component Value Date    CKTOTAL 126 11/16/2021    CKTOTAL 121 10/27/2020    CKTOTAL 77 11/18/2019       No components found for: HSTROPT    Lab Results   Component Value Date    TROPONINT 22 (H) 07/30/2023    TROPONINT 23 (H) 07/30/2023    TROPONINT 17 (H) 07/20/2023       No results found for: DDIMER    Brief Urine Lab Results  (Last result in the past 365 days)        Color   Clarity   Blood   Leuk Est   Nitrite   Protein   CREAT   Urine HCG        08/11/23 0847             105.2                 Lab Results   Component Value Date    TSH 0.633 05/06/2022    TSH 2.120 11/16/2021    TSH 2.910 10/27/2020       No results found for: FREET4      Micro: As of August 18, 2023   No results found for: RESPCX  No results found for: BCIDPCR  Lab Results   Component Value Date    BLOODCX No growth at 5 days 05/22/2023    BLOODCX No growth at 5 days 05/22/2023    BLOODCX No growth at 5 days 11/21/2022     Lab Results   Component Value Date    URINECX No growth 06/05/2017     No results found for: MRSACX  Lab Results   Component Value Date    MRSAPCR No MRSA Detected 08/13/2023    MRSAPCR MRSA Detected (A) 05/23/2023     No results found for: URCX  No components found for: LOWRESPCF  No results found for: THROATCX  No results found for: CULTURES  No components found for: STREPBCX  No results found for: STREPPNEUAG  No results found for: LEGIONELLA  No results found for: MYCOPLASCX  No results found for: GCCX  No results found for: WOUNDCX  No results found for: BODYFLDCX    Lab Results   Component Value Date    FLU Negative 01/22/2018    FLU Negative 01/22/2018       Lab Results   Component Value Date    ADENOVIRUS Not Detected 03/27/2023     Lab Results   Component Value Date    NL488U Not Detected 01/23/2023     Lab Results   Component Value Date    CVHKU1 Not Detected 01/23/2023     Lab Results   Component Value Date    CVNL63 Not Detected  01/23/2023     Lab Results   Component Value Date    CVOC43 Not Detected 01/23/2023     Lab Results   Component Value Date    HUMETPNEVS Not Detected 01/23/2023     Lab Results   Component Value Date    HURVEV Not Detected 01/23/2023     Lab Results   Component Value Date    FLUBPCR Not Detected 07/20/2023     Lab Results   Component Value Date    PARAINFLUE Not Detected 01/23/2023     Lab Results   Component Value Date    PARAFLUV2 Not Detected 01/23/2023     Lab Results   Component Value Date    PARAFLUV3 Not Detected 01/23/2023     Lab Results   Component Value Date    PARAFLUV4 Not Detected 01/23/2023     Lab Results   Component Value Date    BPERTPCR Not Detected 01/23/2023     No results found for: [id number]  Lab Results   Component Value Date    CPNEUPCR Not Detected 01/23/2023     Lab Results   Component Value Date    MPNEUMO Not Detected 01/23/2023     Lab Results   Component Value Date    FLUAPCR Not Detected 07/20/2023     No results found for: FLUAH3  No results found for: FLUAH1  Lab Results   Component Value Date    RSV Not Detected 01/23/2023     Lab Results   Component Value Date    BPARAPCR Not Detected 01/23/2023       COVID 19:  Lab Results   Component Value Date    COVID19 Not Detected 07/20/2023           ABG: Reviewed  Recent Labs     08/15/23  0715 08/16/23  0731 08/17/23  0958   PHART 7.364 7.358 7.452*   YOE3OQG 55.3* 65.5* 55.6*   PO2ART 306.0* 70.7* 88.0   TFV9MYP 31.5* 36.8* 38.8*   BASEEXCESS 5.0* 9.6* 13.1*         Lab Results   Component Value Date    LACTATE 1.6 08/10/2023    LACTATE 0.8 08/09/2023    LACTATE 1.0 08/07/2023         Echo: Results for orders placed in visit on 11/02/22    Adult Transthoracic Echo Complete W/ Cont if Necessary Per Protocol    Interpretation Summary  1.  Normal left ventricular size and systolic function, LVEF 60-65%.  2.  Mild concentric LVH.  3.  Normal LV diastolic filling pattern.  4.  Normal right ventricular size and systolic function.  5.  Mildly  increased left atrial volume index.  6.  No significant valvular abnormalities.      Results for orders placed during the hospital encounter of 06/15/20    Adult Transthoracic Echo Complete W/ Cont if Necessary Per Protocol    Interpretation Summary  1.  Normal left ventricular size with low normal LV systolic function, LVEF 50-55%.  2.  Mild concentric LVH.  3.  Normal LV diastolic filling pattern.  4.  Mild right ventricular dilation with normal RV systolic function.  5.  Normal left and right atrial size.  6.  Trace MR and TR.               CXRay: Latest imaging study was reviewed personally.   Imaging Results (Last 24 Hours)       ** No results found for the last 24 hours. **              Assessment & Recommendations/Plan:   1.  Acute hypercarbic respiratory Failure.  Latest ABG shows compensated respiratory acidosis, with mild respiratory alkalosis  It appears that the patient's AVAPS settings have been adjusted.  Slight adjustments made today as well.  We will repeat ABG as clinically indicated.    2.  Acute encephalopathy/anxiety  Mostly resolved.  Does appear to have waxing and waning issues with encephalopathy on occasion.  Is likely multifactorial  Patient has been restarted on her home medications, including SSRIs.  Can use Vistaril 25 mg IM q. 8 hourly, as needed for anxiety.    3.  Severe COPD   Last FEV1 of 31% predicted  Continue Spiriva and Symbicort.  Apparently is on Breztri at home, which will be continued upon discharge.  Finish course of prednisone today.  We will suggest not restarting prednisone, unless absolutely necessary, especially given concerns regarding wound healing/superficial dehiscence    4.  Chronic respiratory acidosis  It is clear that the patient is noncompliant, with NIV device, at home, but her on admission and as demonstrated by review of ABG.  Once again had a long discussion with the patient regarding the need to be compliant with NIV device at home.  Reviewed her home  settings, which actually match very closely with the current settings.    5.  Anemia.    Continue close monitoring  Results from last 7 days   Lab Units 08/18/23  0456 08/17/23  0434 08/16/23  0500   WBC 10*3/mm3 11.33* 8.54 8.96   HEMOGLOBIN g/dL 8.7* 9.5* 9.9*   PLATELETS 10*3/mm3 214 84* 202     6.  Abnormal CT of the chest    Lab Results   Component Value Date    PROCALCITO 0.12 08/15/2023    PROCALCITO 0.11 08/09/2023    PROCALCITO 0.05 07/20/2023   Procalcitonin has remained unremarkable.  The CT findings appear to suggest inflammatory changes rather than true consolidation/infiltrate.  Due to significant COPD and history of smoking, may need to be followed outpatient with repeat CT in 8-12 weeks or so.  This will be ordered upon discharge    7.  Heavy ex-smoker  Quit smoking a few years ago    8.  Atrial fibrillation  Cardiology has evaluated the patient  On amiodarone tablets.  Anticoagulation has been held due to concern regarding hematoma within the abdominal incision.    9.  Fluid status  Will repeat BNP today.  Lasix was administered on 8/15/2023  We will start the patient on IV diuretic therapy and continue it at least for the next 3 to 5 days  Net IO Since Admission: 6,978.12 mL [08/18/23 0918]  Lab Results   Component Value Date    PROBNP 10,558.0 (H) 08/15/2023    PROBNP 1,694.0 07/30/2023    PROBNP 1,543.0 07/20/2023     10.  Hypokalemia  Replacement ordered  Will need to be monitored  Will replace potassium with any diuretic therapy    11.  GI/nutrition  Nutrition per Dietician.   GI prophylaxis per admitting attending.    12.  DVT prophylaxis  Per admitting attending.    Critical Care time spent in direct patient care: 41 minutes including high complexity decision making to assess, manipulate, and support vital organ system failure in this individual who has impairment of one or more vital organ systems such that there is a high probability of imminent or life threatening deterioration in the  patient's condition.  This time includes multiple reassessments throughout the day, if needed and as appropriate.  This time excludes other billable procedures. Time does include preparation of documents, review of old records, coordinating care with other providers and direct bedside care.    We have reviewed patient's current orders and changes, if any, have been suggested to primary care team. Plan was also discussed with nursing staff, as necessary.     This document was electronically signed by Herbert Poe MD on 08/18/23 at 09:18 EDT      Dictated utilizing Dragon dictation.

## 2023-08-18 NOTE — OUTREACH NOTE
Prep Survey      Flowsheet Row Responses   [facility] facility patient discharged from? [facility]   Is LACE score < 7 ? No   Eligibility Not Eligible   What are the reasons patient is not eligible? [facility]  [[facility]]   Does the patient have one of the following disease processes/diagnoses(primary or secondary)? Other   Prep survey completed? Yes            JAGDISH POMPA - Registered Nurse

## 2023-08-22 ENCOUNTER — PATIENT OUTREACH (OUTPATIENT)
Dept: CASE MANAGEMENT | Facility: OTHER | Age: 78
End: 2023-08-22
Payer: MEDICARE

## 2023-08-22 DIAGNOSIS — J44.1 COPD EXACERBATION: Primary | ICD-10-CM

## 2023-08-22 DIAGNOSIS — J96.21 ACUTE ON CHRONIC RESPIRATORY FAILURE WITH HYPOXIA AND HYPERCAPNIA: ICD-10-CM

## 2023-08-22 DIAGNOSIS — J96.22 ACUTE ON CHRONIC RESPIRATORY FAILURE WITH HYPOXIA AND HYPERCAPNIA: ICD-10-CM

## 2023-08-22 NOTE — OUTREACH NOTE
AMBULATORY CASE MANAGEMENT NOTE    Care Coordination    Based on chart review Patient was admitted to [facility] on 08/07/23 for a small bowel obstruction and discharged to [facility] for short term rehab on 08/18/23. Patient/family has not contacted [facility] for any concerns or assistance within the past 30 days. Will close HRCM; Patient goals achieved.     Soledad JACOBS  Ambulatory Case Management    8/22/2023, 07:53 EDT

## 2023-08-29 ENCOUNTER — TELEPHONE (OUTPATIENT)
Dept: INTERNAL MEDICINE | Facility: CLINIC | Age: 78
End: 2023-08-29

## 2023-08-29 ENCOUNTER — TELEPHONE (OUTPATIENT)
Dept: SURGERY | Facility: CLINIC | Age: 78
End: 2023-08-29

## 2023-08-29 ENCOUNTER — READMISSION MANAGEMENT (OUTPATIENT)
Dept: CALL CENTER | Facility: HOSPITAL | Age: 78
End: 2023-08-29
Payer: MEDICARE

## 2023-08-29 NOTE — TELEPHONE ENCOUNTER
Caller: DENAE FROM [facility]    Relationship: HOME HEALTH NURSE    Best call back number: [phone]      What is the best time to reach you: ANY    Who are you requesting to speak with (clinical staff, provider,  specific staff member): CLINICAL STAFF    Do you know the name of the person who called: DENAE    What was the call regarding: NEEDS TO KNOW IF DR. IBANEZ WILL BE THE MD FOLLOWING THE PT FOR HOME HEALTH AS PT IS D/C'ING TODAY FROM [facility]    Is it okay if the provider responds through MyChart: NO

## 2023-08-29 NOTE — OUTREACH NOTE
Prep Survey      Flowsheet Row Responses   [facility]ecostal facility patient discharged from? Non-[facility]   Is LACE score < 7 ? Non- Discharge   Eligibility [facility]   Date of Discharge 08/30/23   Discharge diagnosis small bowel obstruction   Does the patient have one of the following disease processes/diagnoses(primary or secondary)? Other   Prep survey completed? Yes            Brianna HOLBROOK - Registered Nurse

## 2023-08-29 NOTE — PROGRESS NOTES
Patient: Gabi Dudley  YOB: 1945    Date: 09/01/2023    Primary Care Provider: Paul Quinteros MD    Chief Complaint   Patient presents with    Post-op     laporatomy       History: Patient is here for a post op laparotomy exploratory with lyis of adhesions and reduction of small bowel volvulus. She states that she is doing well. She is having regular bowel movements and eating normally at home. She still has some pain associated with the incision. There is a wound vac in place toward the inferior aspect which was placed while she was at rehab.     The following portions of the patient's history were reviewed and updated as appropriate: allergies, current medications, past family history, past medical history, past social history, past surgical history and problem list.    Review of Systems   Constitutional:  Negative for chills, fever and unexpected weight change.   HENT:  Negative for hearing loss, trouble swallowing and voice change.    Eyes:  Negative for visual disturbance.   Respiratory:  Negative for apnea, cough, chest tightness, shortness of breath and wheezing.    Cardiovascular:  Negative for chest pain, palpitations and leg swelling.   Gastrointestinal:  Positive for abdominal pain and rectal pain. Negative for abdominal distention, anal bleeding, blood in stool, constipation, diarrhea, nausea and vomiting.   Endocrine: Negative for cold intolerance and heat intolerance.   Genitourinary:  Negative for difficulty urinating, dysuria and flank pain.   Musculoskeletal:  Negative for back pain and gait problem.   Skin:  Negative for color change, rash and wound.   Neurological:  Negative for dizziness, syncope, speech difficulty, weakness, light-headedness, numbness and headaches.   Hematological:  Negative for adenopathy. Does not bruise/bleed easily.   Psychiatric/Behavioral:  Negative for confusion. The patient is not nervous/anxious.      Vital Signs  Vitals:    09/01/23 1057   BP: 102/62  "  Pulse: 65   Temp: 98.2 øF (36.8 øC)   SpO2: 96%   Weight: 67.6 kg (149 lb)   Height: 165.1 cm (65\")       Allergies:  Allergies   Allergen Reactions    Morphine Confusion, Irritability and Hallucinations    Doxycycline GI Intolerance       Medications:    Current Outpatient Medications:     albuterol sulfate  (90 Base) MCG/ACT inhaler, Inhale 2 puffs Every 4 (Four) Hours As Needed for Wheezing., Disp: 18 g, Rfl: 5    amiodarone (PACERONE) 200 MG tablet, Take 1 tablet by mouth Daily., Disp: , Rfl:     apixaban (Eliquis) 5 MG tablet tablet, Take 1 tablet by mouth Every 12 (Twelve) Hours., Disp: 60 tablet, Rfl: 11    atorvastatin (LIPITOR) 20 MG tablet, TAKE 1 TABLET BY MOUTH DAILY, Disp: 90 tablet, Rfl: 3    Budeson-Glycopyrrol-Formoterol (Breztri Aerosphere) 160-9-4.8 MCG/ACT aerosol inhaler, Inhale 2 puffs 2 (Two) Times a Day. Rinse mouth out after use, Disp: 3 each, Rfl: 3    cetirizine (zyrTEC) 10 MG tablet, TAKE 1 TABLET BY MOUTH DAILY., Disp: 30 tablet, Rfl: 2    Cholecalciferol (vitamin D3) 125 MCG (5000 UT) capsule capsule, Take 1 capsule by mouth Daily., Disp: , Rfl:     dilTIAZem CD (CARDIZEM CD) 120 MG 24 hr capsule, Take 2 capsules by mouth Daily., Disp: 60 capsule, Rfl: 11    DULoxetine (CYMBALTA) 60 MG capsule, Take 1 capsule by mouth Daily., Disp: 180 capsule, Rfl: 3    ferrous sulfate 324 (65 Fe) MG tablet delayed-release EC tablet, Take 1 tablet by mouth Daily With Breakfast., Disp: 30 tablet, Rfl:     fluticasone (FLONASE) 50 MCG/ACT nasal spray, 2 sprays into the nostril(s) as directed by provider Daily., Disp: , Rfl:     ipratropium-albuterol (DUO-NEB) 0.5-2.5 mg/3 ml nebulizer, USE 3 mL VIA NEBULIZER EVERY 4 HOURS AS NEEDED FOR WHEEZING, Disp: 360 mL, Rfl: 11    metoprolol tartrate (LOPRESSOR) 25 MG tablet, Take 0.5 tablets by mouth Every 12 (Twelve) Hours., Disp: , Rfl:     O2 (OXYGEN), Inhale 2 L/min As Needed., Disp: , Rfl:     pantoprazole (PROTONIX) 40 MG EC tablet, TAKE 1 TABLET BY " MOUTH DAILY, Disp: 90 tablet, Rfl: 3    QUEtiapine (SEROquel) 25 MG tablet, Take 0.5 tablets by mouth Every Night., Disp: 30 tablet, Rfl: 2    sertraline (ZOLOFT) 100 MG tablet, TAKE 1 & 1/2 TABLET BY MOUTH DAILY, Disp: 135 tablet, Rfl: 3    sodium chloride 0.65 % nasal spray, 2 sprays into the nostril(s) as directed by provider As Needed (dry nose)., Disp: , Rfl: 12    traZODone (DESYREL) 100 MG tablet, 1 qhs po prn, Disp: 30 tablet, Rfl: 2    clonazePAM (KlonoPIN) 0.5 MG tablet, Take 1 tablet by mouth Daily for 3 days., Disp: 3 tablet, Rfl: 0    furosemide (Lasix) 40 MG tablet, Take 1 tablet by mouth Daily for 3 days., Disp: 3 tablet, Rfl: 0    Physical Exam:   General Appearance:    Alert, cooperative, in no acute distress   Head:    Normocephalic, without obvious abnormality, atraumatic   Lungs:     Clear to auscultation,respirations regular, even and                  unlabored    Heart:    Regular rhythm and normal rate, normal S1 and S2, no            murmur, no gallop, no rub, no click   Abdomen:     Normal bowel sounds, no masses, no organomegaly, soft        non-tender, non-distended, no guarding, no rebound                tenderness, incision is well healing with small wound vac in place at inferior aspect. Staples in place. She has tenderness to palpation of the abdomen.    Extremities:   Moves all extremities well, no edema, no cyanosis, no             redness   Pulses:   Pulses palpable and equal bilaterally   Skin:   No bleeding, bruising or rash     Results Review:   I reviewed the patient's new clinical results.     Review of Systems was reviewed and confirmed as accurate as documented by the MA.    ASSESSMENT/PLAN:    1. Status post exploratory laparotomy    2. History of small bowel obstruction       I saw the patient today as her first post operative appointment. She has been doing well since her hospitalization and was just released from rehab. She has a wound vac on the inferior aspect of the  incision which was replaced today, good granulation tissue was noted in the base and it is quite superficial, measuring 5 x 1.5. 0.5 cm. She will continue to have her vac changed by home care every Monday, Wednesday, and Friday. She has a bulge on the anterior abdomen that may be her body habitus, but she is also high risk for an incisional hernia given her overlying respiratory problems and her complicated post operative course from an activity and respiratory standpoint. I will add a CT abdomen and pelvis onto her existing CT chest later this month. I will see her again in 2 weeks to evaluate the progress of her healing. She is agreeable to the plan and all questions were answered.     Electronically signed by Bianca Isaac DO  09/01/23

## 2023-08-29 NOTE — TELEPHONE ENCOUNTER
Caller: [facility]    Relationship to patient:     Best call back number:     New or established patient?  [] New  [x] Established    Date of discharge: 08/30/2023    Facility [facility] from: [facility]    Diagnosis/Symptoms: SMALL BOWEL OBSTRUCTION    Length of stay (If applicable):     Specialty Only: Did you see a [facility] health provider?    [] Yes  [x] No  If so, who?

## 2023-08-31 ENCOUNTER — TRANSITIONAL CARE MANAGEMENT TELEPHONE ENCOUNTER (OUTPATIENT)
Dept: CALL CENTER | Facility: HOSPITAL | Age: 78
End: 2023-08-31
Payer: MEDICARE

## 2023-08-31 NOTE — OUTREACH NOTE
Call Center TCM Note      Flowsheet Row Responses   [facility] patient discharged from? Non-BH   Does the patient have one of the following disease processes/diagnoses(primary or secondary)? Other   TCM attempt successful? No   Unsuccessful attempts Attempt 1   Call Status Left message  [no release, and mailbox is full. ]            Bhupinder Fuchs RN    8/31/2023, 15:34 EDT

## 2023-08-31 NOTE — OUTREACH NOTE
Call Center TCM Note      Flowsheet Row Responses   [facility] patient discharged from? Non-   Does the patient have one of the following disease processes/diagnoses(primary or secondary)? Other   TCM attempt successful? No   Unsuccessful attempts Attempt 2   Call Status Left message            Bhupinder Fuchs RN    8/31/2023, 17:10 EDT

## 2023-09-01 ENCOUNTER — TRANSITIONAL CARE MANAGEMENT TELEPHONE ENCOUNTER (OUTPATIENT)
Dept: CALL CENTER | Facility: HOSPITAL | Age: 78
End: 2023-09-01
Payer: MEDICARE

## 2023-09-01 ENCOUNTER — OFFICE VISIT (OUTPATIENT)
Dept: SURGERY | Facility: CLINIC | Age: 78
End: 2023-09-01
Payer: MEDICARE

## 2023-09-01 VITALS
OXYGEN SATURATION: 96 % | SYSTOLIC BLOOD PRESSURE: 102 MMHG | WEIGHT: 149 LBS | DIASTOLIC BLOOD PRESSURE: 62 MMHG | BODY MASS INDEX: 24.83 KG/M2 | TEMPERATURE: 98.2 F | HEART RATE: 65 BPM | HEIGHT: 65 IN

## 2023-09-01 DIAGNOSIS — Z87.19 HISTORY OF SMALL BOWEL OBSTRUCTION: ICD-10-CM

## 2023-09-01 DIAGNOSIS — Z98.890 STATUS POST EXPLORATORY LAPAROTOMY: Primary | ICD-10-CM

## 2023-09-01 NOTE — OUTREACH NOTE
Call Center TCM Note      Flowsheet Row Responses   [facility] patient discharged from? Non-BH   Does the patient have one of the following disease processes/diagnoses(primary or secondary)? Other   TCM attempt successful? No   Unsuccessful attempts Attempt 3   Call Status Voice mail issues  [Mailbox is full]   Does the patient have an appointment with their PCP within 7-14 days of discharge? Yes  [9/5/2023 at 11:30 AM]            Daisy Fam RN    9/1/2023, 13:21 EDT

## 2023-09-08 ENCOUNTER — TELEPHONE (OUTPATIENT)
Dept: SURGERY | Facility: CLINIC | Age: 78
End: 2023-09-08
Payer: MEDICARE

## 2023-09-08 ENCOUNTER — TELEPHONE (OUTPATIENT)
Dept: CARDIOLOGY | Facility: CLINIC | Age: 78
End: 2023-09-08
Payer: MEDICARE

## 2023-09-08 NOTE — TELEPHONE ENCOUNTER
I spoke to raul to inform her if the patient is refusing the wound vac then wet to dry dressing changes can be made daily.

## 2023-09-08 NOTE — TELEPHONE ENCOUNTER
Vickie from [facility] called and stated the pts woundvac is causing the pt pain. The wound is very shallow ( 3.5 x 1 x 0.2 ). The patient does not want to put the woundvac put back on. Vickie states there is significant decreased drainage. Best call back number is [phone]

## 2023-09-13 RX ORDER — FLUCONAZOLE 150 MG/1
150 TABLET ORAL ONCE
Qty: 1 TABLET | Refills: 0 | Status: SHIPPED | OUTPATIENT
Start: 2023-09-13 | End: 2023-09-13

## 2023-09-13 NOTE — PROGRESS NOTES
"Patient: Gabi Dudley    YOB: 1945    Date: 09/18/2023    Primary Care Provider: Paul Quinteros MD    Chief Complaint   Patient presents with    Post-op     LAPOCTOMY        History of present illness:  I saw the patient in the office today as a followup from their recent laparotomy exploratory with lyis of adhesions and reduction of small bowel volvulus. She has a small wound on the inferior aspect of the incision that was initially using a wound vac to heal, but has since switched to wet to dry. They state that she is getting much better and she is getting her strength back. She is tolerating a diet and having normal bowel movements.     Vital Signs:   Vitals:    09/18/23 1305   BP: 102/60   Pulse: 66   Temp: 98 °F (36.7 °C)   SpO2: 98%   Weight: 67.6 kg (149 lb)   Height: 165.1 cm (65\")       Physical Exam:   General Appearance:    Alert, cooperative, in no acute distress   Abdomen:     no masses, no organomegaly, soft non-tender, non-distended, no guarding, small open wound is well healing and practically closed. There is an area of firmness that is tender to palpation just to the right of the incision.      Assessment / Plan:    1. Status post exploratory laparotomy    2. Visit for wound check        I did discuss the situation with the patient today in the office and they have done well from their recent laparotomy exploratory with lyis of adhesions and reduction of small bowel volvulus. I have released the patient back to normal activity, they understand that they need to be careful about heavy lifting.  She can stop doing dressing changes and leave her wound open to air. She should wash it daily with gentle antibacterial soap and keep it clean and dry. She has a CT chest/abdomen/pelvis scheduled next week which we will use to evaluate the area of firmness by her incision. It is likely a hematoma. I will call them with the results. I need to see the patient back in the office only if they are " having further problems, they know to call me if they are.      Electronically signed by Bianca Isaac,   09/18/23

## 2023-09-13 NOTE — TELEPHONE ENCOUNTER
Patients daughter Malu called stating that he mother Gabi was released from [facility] with antibiotics. She has since developed a yeast infection. She tried to reach out to Stanford Alamo MD from [facility] and has been unable to reach him. Patient is requesting Diflucan for 2 days to be sent to [facility] please. Any questions please call Malu at [phone]. Thank you.

## 2023-09-14 NOTE — PROGRESS NOTES
[facility] Cardiology Office Follow Up Note    Gabi Dudley  [id number]  2023    Primary Care Provider: Krystina Saunders DO   Referring Provider: Tom Keyes MD    Chief Complaint: Follow-up of PAF    History of Present Illness:   Mrs. Gabi Dudley is a 78 y.o. female who presents to the Cardiology Clinic for follow up.  The patient has a past medical history significant for hypertension, dyslipidemia, prior pulmonary embolism, fibromyalgia, anxiety, and chronic tobacco use complicated by COPD.  Her past cardiac history is significant for atrial fibrillation and nonobstructive coronary artery disease diagnosed on remote coronary angiogram in approximately .  In , during an exploratory laparotomy and small bowel reduction, the patient went into A-fib with RVR.  She was started on amiodarone.  She was transition to oral amiodarone and discharged on home metoprolol and Eliquis.  She presents today for follow-up.  The patient reports feeling improved and is hospital discharge.  She did spend a considerable amount of recovery time at [facility].  She specifically denies chest pain, dyspnea, palpitations, dizziness, syncope.  She denies lower extremity edema.  The patient is accompanied by her daughter and they report the patient is often hypotensive at home and was certainly hypotensive at [facility].  They report home blood pressures < 120 mmHg.  She offers no other complaints or concerns at this time.    Past Cardiac Testin. Last Coronary Angio: , reportedly nonobstructive disease (report unavailable)  2. Prior Stress Testing: Remote (report unavailable)  3. Last Echo: 2022   1.  Normal left ventricular size and systolic function, LVEF 60-65%.  2.  Mild concentric LVH.  3.  Normal LV diastolic filling pattern.  4.  Normal right ventricular size and systolic function.  5.  Mildly increased left atrial volume index.  6.  No significant valvular  abnormalities.  4. Prior Holter Monitor: 6/27/2023   1.  The predominant rhythm is sinus rhythm with an average heart rate 84 bpm.  2.  Paroxysmal atrial fibrillation with occasional rapid ventricular rates up to 157 bpm.  AF burden 21%.  Longest episode 4 hours 35 minutes.  3.  Occasional supraventricular and ventricular ectopy.  4.  No symptom diary submitted.    Review of Systems:   Review of Systems  As Noted in HPI.   I have reviewed and confirmed the accuracy of the ROS as documented by the MA/LPN/RN REGINA Harrington    I have reviewed and/or updated the patient's past medical, past surgical, family, social history, problem list and allergies as appropriate.     Medications:     Current Outpatient Medications:     albuterol sulfate  (90 Base) MCG/ACT inhaler, Inhale 2 puffs Every 4 (Four) Hours As Needed for Wheezing., Disp: 18 g, Rfl: 5    amiodarone (PACERONE) 200 MG tablet, Take 1 tablet by mouth Daily., Disp: , Rfl:     apixaban (Eliquis) 5 MG tablet tablet, Take 1 tablet by mouth Every 12 (Twelve) Hours., Disp: 60 tablet, Rfl: 11    atorvastatin (LIPITOR) 20 MG tablet, TAKE 1 TABLET BY MOUTH DAILY, Disp: 90 tablet, Rfl: 3    Budeson-Glycopyrrol-Formoterol (Breztri Aerosphere) 160-9-4.8 MCG/ACT aerosol inhaler, Inhale 2 puffs 2 (Two) Times a Day. Rinse mouth out after use, Disp: 3 each, Rfl: 3    cefdinir (OMNICEF) 300 MG capsule, Take 1 capsule by mouth 2 (Two) Times a Day., Disp: 20 capsule, Rfl: 0    cetirizine (zyrTEC) 10 MG tablet, Take 1 tablet by mouth Daily., Disp: 30 tablet, Rfl: 2    Cholecalciferol (vitamin D3) 125 MCG (5000 UT) capsule capsule, Take 1 capsule by mouth Daily., Disp: , Rfl:     dilTIAZem CD (CARDIZEM CD) 180 MG 24 hr capsule, , Disp: , Rfl:     DULoxetine (CYMBALTA) 60 MG capsule, Take 1 capsule by mouth Daily., Disp: 180 capsule, Rfl: 3    ferrous sulfate 324 (65 Fe) MG tablet delayed-release EC tablet, Take 1 tablet by mouth Daily With Breakfast., Disp: 30  "tablet, Rfl:     fluticasone (FLONASE) 50 MCG/ACT nasal spray, 2 sprays into the nostril(s) as directed by provider Daily., Disp: , Rfl:     ipratropium-albuterol (DUO-NEB) 0.5-2.5 mg/3 ml nebulizer, USE 3 mL VIA NEBULIZER EVERY 4 HOURS AS NEEDED FOR WHEEZING, Disp: 360 mL, Rfl: 11    metoprolol tartrate (LOPRESSOR) 25 MG tablet, Take 0.5 tablets by mouth Every 12 (Twelve) Hours., Disp: , Rfl:     O2 (OXYGEN), Inhale 2 L/min As Needed., Disp: , Rfl:     pantoprazole (PROTONIX) 40 MG EC tablet, TAKE 1 TABLET BY MOUTH DAILY, Disp: 90 tablet, Rfl: 3    potassium chloride 10 MEQ CR tablet, 1 tablet., Disp: , Rfl:     QUEtiapine (SEROquel) 25 MG tablet, Take 0.5 tablets by mouth Every Night., Disp: 30 tablet, Rfl: 2    sertraline (ZOLOFT) 100 MG tablet, TAKE 1 & 1/2 TABLET BY MOUTH DAILY, Disp: 135 tablet, Rfl: 3    sodium chloride 0.65 % nasal spray, 2 sprays into the nostril(s) as directed by provider As Needed (dry nose)., Disp: , Rfl: 12    traZODone (DESYREL) 100 MG tablet, 1 qhs po prn, Disp: 30 tablet, Rfl: 2    clonazePAM (KlonoPIN) 0.5 MG tablet, Take 1 tablet by mouth Daily for 3 days., Disp: 3 tablet, Rfl: 0    furosemide (Lasix) 40 MG tablet, Take 1 tablet by mouth Daily for 3 days., Disp: 3 tablet, Rfl: 0    Physical Exam:  Vital Signs:   Vitals:    09/26/23 1538   BP: 140/92   BP Location: Left arm   Patient Position: Sitting   Cuff Size: Adult   Pulse: 77   SpO2: 97%   Weight: 66.2 kg (146 lb)   Height: 165.1 cm (65\")     Body mass index is 24.3 kg/m².    Physical Exam  Vitals and nursing note reviewed.   Constitutional:       General: She is not in acute distress.  HENT:      Head: Normocephalic and atraumatic.   Neck:      Trachea: Trachea normal.   Cardiovascular:      Rate and Rhythm: Normal rate and regular rhythm.      Pulses: Normal pulses.      Heart sounds: Normal heart sounds. No murmur heard.     No friction rub. No gallop.   Pulmonary:      Effort: Pulmonary effort is normal.      Breath " sounds: Normal breath sounds.   Musculoskeletal:      Cervical back: Neck supple.      Right lower leg: No edema.      Left lower leg: No edema.   Skin:     General: Skin is warm and dry.   Neurological:      Mental Status: She is alert and oriented to person, place, and time.   Psychiatric:         Mood and Affect: Mood normal.         Behavior: Behavior normal. Behavior is cooperative.         Thought Content: Thought content does not include suicidal ideation.         Results Review:   I reviewed the patient's new clinical results.      ECG 12 Lead    Date/Time: 9/26/2023 3:45 PM  Performed by: Britt Frarar APRN    Authorized by: Britt Farrar APRN  Rhythm: sinus rhythm  Rate: normal  QRS axis: normal    Clinical impression: normal ECG      Assessment / Plan:   Diagnoses and all orders for this visit:    1. Paroxysmal atrial fibrillation (Primary)  ECG today shows NSR  Continue amiodarone as antiarrhythmic  Continue metoprolol and diltiazem for rate control  Continue Eliquis for CVA prophylaxis resume routine follow-up          Preventative Cardiology:   Tobacco Cessation: N/A   Advance Care Planning: ACP discussion was declined by the patient. Patient does not have an advance directive, declines further assistance.     Follow Up:   Resume routine follow-up in January    Thank you for allowing me to participate in the care of your patient. Please do not hesitate to contact me with additional questions or concerns.     REGINA Flower

## 2023-09-18 ENCOUNTER — OFFICE VISIT (OUTPATIENT)
Dept: SURGERY | Facility: CLINIC | Age: 78
End: 2023-09-18
Payer: MEDICARE

## 2023-09-18 VITALS
BODY MASS INDEX: 24.83 KG/M2 | TEMPERATURE: 98 F | WEIGHT: 149 LBS | HEIGHT: 65 IN | OXYGEN SATURATION: 98 % | SYSTOLIC BLOOD PRESSURE: 102 MMHG | HEART RATE: 66 BPM | DIASTOLIC BLOOD PRESSURE: 60 MMHG

## 2023-09-18 DIAGNOSIS — Z51.89 VISIT FOR WOUND CHECK: ICD-10-CM

## 2023-09-18 DIAGNOSIS — Z98.890 STATUS POST EXPLORATORY LAPAROTOMY: Primary | ICD-10-CM

## 2023-09-18 RX ORDER — DILTIAZEM HYDROCHLORIDE 180 MG/1
CAPSULE, COATED, EXTENDED RELEASE ORAL
COMMUNITY
Start: 2023-08-29

## 2023-09-18 RX ORDER — POTASSIUM CHLORIDE 750 MG/1
10 TABLET, FILM COATED, EXTENDED RELEASE ORAL
COMMUNITY
Start: 2023-08-29

## 2023-09-19 ENCOUNTER — TELEPHONE (OUTPATIENT)
Dept: SURGERY | Facility: CLINIC | Age: 78
End: 2023-09-19
Payer: MEDICARE

## 2023-09-19 ENCOUNTER — OFFICE VISIT (OUTPATIENT)
Dept: INTERNAL MEDICINE | Facility: CLINIC | Age: 78
End: 2023-09-19
Payer: MEDICARE

## 2023-09-19 VITALS
WEIGHT: 147.12 LBS | SYSTOLIC BLOOD PRESSURE: 120 MMHG | TEMPERATURE: 96.8 F | BODY MASS INDEX: 24.51 KG/M2 | DIASTOLIC BLOOD PRESSURE: 60 MMHG | HEART RATE: 70 BPM | HEIGHT: 65 IN | OXYGEN SATURATION: 95 %

## 2023-09-19 DIAGNOSIS — R39.9 UTI SYMPTOMS: Primary | ICD-10-CM

## 2023-09-19 DIAGNOSIS — J01.10 ACUTE NON-RECURRENT FRONTAL SINUSITIS: ICD-10-CM

## 2023-09-19 DIAGNOSIS — J30.1 NON-SEASONAL ALLERGIC RHINITIS DUE TO POLLEN: ICD-10-CM

## 2023-09-19 DIAGNOSIS — R09.81 CONGESTION OF NASAL SINUS: ICD-10-CM

## 2023-09-19 LAB
BILIRUB BLD-MCNC: ABNORMAL MG/DL
CLARITY, POC: ABNORMAL
COLOR UR: ABNORMAL
EXPIRATION DATE: ABNORMAL
EXPIRATION DATE: ABNORMAL
EXPIRATION DATE: NORMAL
FLUAV AG UPPER RESP QL IA.RAPID: NOT DETECTED
FLUBV AG UPPER RESP QL IA.RAPID: DETECTED
GLUCOSE UR STRIP-MCNC: NEGATIVE MG/DL
INTERNAL CONTROL: ABNORMAL
INTERNAL CONTROL: NORMAL
KETONES UR QL: ABNORMAL
LEUKOCYTE EST, POC: ABNORMAL
Lab: ABNORMAL
Lab: ABNORMAL
Lab: NORMAL
NITRITE UR-MCNC: POSITIVE MG/ML
PH UR: 6 [PH] (ref 5–8)
PROT UR STRIP-MCNC: ABNORMAL MG/DL
RBC # UR STRIP: NEGATIVE /UL
S PYO AG THROAT QL: NEGATIVE
SARS-COV-2 AG UPPER RESP QL IA.RAPID: NOT DETECTED
SP GR UR: 1.03 (ref 1–1.03)
UROBILINOGEN UR QL: ABNORMAL

## 2023-09-19 RX ORDER — CETIRIZINE HYDROCHLORIDE 10 MG/1
10 TABLET ORAL DAILY
Qty: 30 TABLET | Refills: 2 | Status: SHIPPED | OUTPATIENT
Start: 2023-09-19

## 2023-09-19 RX ORDER — CEFDINIR 300 MG/1
300 CAPSULE ORAL 2 TIMES DAILY
Qty: 20 CAPSULE | Refills: 0 | Status: SHIPPED | OUTPATIENT
Start: 2023-09-19

## 2023-09-19 RX ORDER — FLUCONAZOLE 150 MG/1
150 TABLET ORAL ONCE
Qty: 1 TABLET | Refills: 0 | Status: SHIPPED | OUTPATIENT
Start: 2023-09-19 | End: 2023-09-19

## 2023-09-19 NOTE — TELEPHONE ENCOUNTER
Called Malu, patient's daughter to let her know to arrive at 4:45 for her appointment.  They added the abdomen/Pelvis to the CT scans.

## 2023-09-19 NOTE — PROGRESS NOTES
Subjective   Gabi Dudley is a 78 y.o. female.     History of Present Illness  Patient presents for hospital follow-up.  Had SBO with resection.  Has been doing well since.  Has been cleared by surgery.  Having good bowel movements.  No abdominal pain.  Patient also been having sinus pressure and pain for the past 2 weeks.  Started having large amounts of thick green mucus 2 days ago.  This was different from her previous symptoms.  Has been having dry cough.  Denies shortness of breath other than baseline.  Denies fever or chills.  Patient with 2 days of dysuria.  Burning on urination.  Urinary frequency.  No fevers or chills.  No nausea vomiting diarrhea.  Allergic rhinitis-patient is on Zyrtec chronically.  Does well on this normally.  Other than recent flareup she has been stable    The following portions of the patient's history were reviewed and updated as appropriate: allergies, current medications, past family history, past medical history, past social history, past surgical history, and problem list.    Review of Systems   All other systems reviewed and are negative.    Objective   Physical Exam  Vitals and nursing note reviewed.   Constitutional:       Appearance: Normal appearance.   HENT:      Head: Normocephalic and atraumatic.      Right Ear: External ear normal.      Left Ear: External ear normal.      Nose: Nose normal.      Mouth/Throat:      Mouth: Mucous membranes are moist.      Pharynx: Oropharynx is clear. No oropharyngeal exudate or posterior oropharyngeal erythema.   Eyes:      Extraocular Movements: Extraocular movements intact.      Conjunctiva/sclera: Conjunctivae normal.      Pupils: Pupils are equal, round, and reactive to light.   Cardiovascular:      Rate and Rhythm: Normal rate and regular rhythm.      Pulses: Normal pulses.      Heart sounds: Normal heart sounds.   Pulmonary:      Effort: Pulmonary effort is normal.      Breath sounds: Normal breath sounds.   Abdominal:       General: Abdomen is flat. Bowel sounds are normal.      Palpations: Abdomen is soft.   Musculoskeletal:         General: Normal range of motion.      Cervical back: Normal range of motion.   Skin:     General: Skin is warm.      Capillary Refill: Capillary refill takes less than 2 seconds.   Neurological:      General: No focal deficit present.      Mental Status: She is alert and oriented to person, place, and time. Mental status is at baseline.   Psychiatric:         Mood and Affect: Mood normal.         Behavior: Behavior normal.         Thought Content: Thought content normal.         Judgment: Judgment normal.       Assessment & Plan   Diagnoses and all orders for this visit:    1. UTI symptoms (Primary)  -     POCT urinalysis dipstick, automated    2. Congestion of nasal sinus  -     POCT SARS-CoV-2 Antigen TETE + Flu  -     POCT rapid strep A    3. Acute non-recurrent frontal sinusitis  -     cefdinir (OMNICEF) 300 MG capsule; Take 1 capsule by mouth 2 (Two) Times a Day.  Dispense: 20 capsule; Refill: 0  -     fluconazole (Diflucan) 150 MG tablet; Take 1 tablet by mouth 1 (One) Time for 1 dose. And repeat in 3 days  Dispense: 1 tablet; Refill: 0    4. Non-seasonal allergic rhinitis due to pollen  -     cetirizine (zyrTEC) 10 MG tablet; Take 1 tablet by mouth Daily.  Dispense: 30 tablet; Refill: 2    Refill Zyrtec  UA positive for leukocytes and nitrites  Start Omnicef to double cover sinusitis as well as UTI  Urine culture ordered  Diflucan ordered because patient gets yeast infections after antibiotics

## 2023-09-26 ENCOUNTER — OFFICE VISIT (OUTPATIENT)
Dept: CARDIOLOGY | Facility: CLINIC | Age: 78
End: 2023-09-26
Payer: MEDICARE

## 2023-09-26 VITALS
HEART RATE: 77 BPM | SYSTOLIC BLOOD PRESSURE: 126 MMHG | HEIGHT: 65 IN | BODY MASS INDEX: 24.32 KG/M2 | DIASTOLIC BLOOD PRESSURE: 68 MMHG | WEIGHT: 146 LBS | OXYGEN SATURATION: 97 %

## 2023-09-26 DIAGNOSIS — I48.0 PAROXYSMAL ATRIAL FIBRILLATION: Primary | ICD-10-CM

## 2023-09-27 ENCOUNTER — TELEPHONE (OUTPATIENT)
Dept: INTERNAL MEDICINE | Facility: CLINIC | Age: 78
End: 2023-09-27

## 2023-09-27 RX ORDER — AMIODARONE HYDROCHLORIDE 200 MG/1
200 TABLET ORAL
Qty: 90 TABLET | Refills: 3 | Status: SHIPPED | OUTPATIENT
Start: 2023-09-27

## 2023-09-27 RX ORDER — DILTIAZEM HYDROCHLORIDE 180 MG/1
180 CAPSULE, COATED, EXTENDED RELEASE ORAL DAILY
Qty: 90 CAPSULE | Refills: 3 | Status: SHIPPED | OUTPATIENT
Start: 2023-09-27

## 2023-09-27 NOTE — TELEPHONE ENCOUNTER
Pts daughter called stating that she is needing refills on Diltiazem, Metoprolol, and Amiodarone. Okay to send refills?

## 2023-09-27 NOTE — TELEPHONE ENCOUNTER
Caller: Malu Aguilar    Relationship: Emergency Contact    Best call back number: [phone]     Requested Prescriptions: CLODINACOLE? CREAM.  PATIENT NOT KNOW THE SPELLING  Requested Prescriptions      No prescriptions requested or ordered in this encounter        Pharmacy where request should be sent: [id number] - [place], [place] - [address]  - [phone]  - [phone] FX     Last office visit with prescribing clinician: 9/19/2023   Last telemedicine visit with prescribing clinician: Visit date not found   Next office visit with prescribing clinician: 12/21/2023     Additional details provided by patient: HAS NONE LEFT, FOR SKIN BREAKOUTS    Does the patient have less than a 3 day supply:  [x] Yes  [] No    Would you like a call back once the refill request has been completed: [x] Yes [] No    If the office needs to give you a call back, can they leave a voicemail: [] Yes [] No    Rafa De La Rosa Rep   09/27/23 09:02 EDT

## 2023-09-28 ENCOUNTER — HOSPITAL ENCOUNTER (OUTPATIENT)
Dept: CT IMAGING | Facility: HOSPITAL | Age: 78
Discharge: HOME | End: 2023-09-28
Payer: MEDICARE

## 2023-09-28 DIAGNOSIS — J96.21 ACUTE ON CHRONIC RESPIRATORY FAILURE WITH HYPOXIA AND HYPERCAPNIA: ICD-10-CM

## 2023-09-28 DIAGNOSIS — Z87.19 HISTORY OF SMALL BOWEL OBSTRUCTION: ICD-10-CM

## 2023-09-28 DIAGNOSIS — J44.1 COPD EXACERBATION: ICD-10-CM

## 2023-09-28 DIAGNOSIS — J96.22 ACUTE ON CHRONIC RESPIRATORY FAILURE WITH HYPOXIA AND HYPERCAPNIA: ICD-10-CM

## 2023-09-28 DIAGNOSIS — R91.8 ABNORMAL CT SCAN OF LUNG: ICD-10-CM

## 2023-09-28 DIAGNOSIS — Z98.890 STATUS POST EXPLORATORY LAPAROTOMY: ICD-10-CM

## 2023-09-28 LAB
BACTERIA UR CULT: ABNORMAL
OTHER ANTIBIOTIC SUSC ISLT: ABNORMAL

## 2023-09-28 PROCEDURE — 71250 CT THORAX DX C-: CPT

## 2023-09-28 PROCEDURE — 74176 CT ABD & PELVIS W/O CONTRAST: CPT

## 2023-09-28 RX ORDER — CLOTRIMAZOLE AND BETAMETHASONE DIPROPIONATE 10; .64 MG/G; MG/G
1 CREAM TOPICAL 2 TIMES DAILY
Qty: 45 G | Refills: 0 | Status: SHIPPED | OUTPATIENT
Start: 2023-09-28

## 2023-09-28 RX ORDER — SULFAMETHOXAZOLE AND TRIMETHOPRIM 800; 160 MG/1; MG/1
1 TABLET ORAL 2 TIMES DAILY
Qty: 14 TABLET | Refills: 0 | Status: SHIPPED | OUTPATIENT
Start: 2023-09-28 | End: 2023-09-28 | Stop reason: SDUPTHER

## 2023-09-28 RX ORDER — SULFAMETHOXAZOLE AND TRIMETHOPRIM 800; 160 MG/1; MG/1
1 TABLET ORAL 2 TIMES DAILY
Qty: 14 TABLET | Refills: 0 | Status: SHIPPED | OUTPATIENT
Start: 2023-09-28

## 2023-10-09 DIAGNOSIS — J30.1 NON-SEASONAL ALLERGIC RHINITIS DUE TO POLLEN: ICD-10-CM

## 2023-10-09 DIAGNOSIS — F41.9 ANXIETY: ICD-10-CM

## 2023-10-09 RX ORDER — ATORVASTATIN CALCIUM 20 MG/1
20 TABLET, FILM COATED ORAL DAILY
Qty: 90 TABLET | Refills: 3 | Status: SHIPPED | OUTPATIENT
Start: 2023-10-09

## 2023-10-09 RX ORDER — CETIRIZINE HYDROCHLORIDE 10 MG/1
10 TABLET ORAL DAILY
Qty: 30 TABLET | Refills: 2 | Status: SHIPPED | OUTPATIENT
Start: 2023-10-09

## 2023-10-09 RX ORDER — CLONAZEPAM 0.5 MG/1
0.5 TABLET ORAL DAILY
Qty: 60 TABLET | Refills: 2 | Status: SHIPPED | OUTPATIENT
Start: 2023-10-09

## 2023-10-09 RX ORDER — CLONAZEPAM 0.5 MG/1
0.5 TABLET ORAL DAILY
Qty: 3 TABLET | Refills: 0 | Status: CANCELLED | OUTPATIENT
Start: 2023-10-09 | End: 2023-10-12

## 2023-10-09 NOTE — TELEPHONE ENCOUNTER
Caller: Malu Aguilar    Relationship: Emergency Contact    Best call back number: [phone]     Requested Prescriptions:   Requested Prescriptions     Pending Prescriptions Disp Refills    atorvastatin (LIPITOR) 20 MG tablet 90 tablet 3     Sig: Take 1 tablet by mouth Daily.    cetirizine (zyrTEC) 10 MG tablet 30 tablet 2     Sig: Take 1 tablet by mouth Daily.    clonazePAM (KlonoPIN) 0.5 MG tablet 3 tablet 0     Sig: Take 1 tablet by mouth Daily for 3 days.   SEROQUEL 25 MG 1/2 TABLET HS.    Pharmacy where request should be sent: [facility] [id number] - [phone]  - [phone] FX     Last office visit with prescribing clinician: 9/19/2023   Last telemedicine visit with prescribing clinician: Visit date not found   Next office visit with prescribing clinician: 12/21/2023     Additional details provided by patient: SEROQUEL GIVEN AT [facility].  ENOUGH PILLS TO LAST UNTIL FRIDAY.      Does the patient have less than a 3 day supply:  [] Yes  [x] No    Would you like a call back once the refill request has been completed: [] Yes [x] No    If the office needs to give you a call back, can they leave a voicemail: [] Yes [x] No    Nevin Soto   10/09/23 11:36 EDT

## 2023-10-09 NOTE — TELEPHONE ENCOUNTER
Rx Refill Note  Requested Prescriptions     Pending Prescriptions Disp Refills    atorvastatin (LIPITOR) 20 MG tablet 90 tablet 3     Sig: Take 1 tablet by mouth Daily.    cetirizine (zyrTEC) 10 MG tablet 30 tablet 2     Sig: Take 1 tablet by mouth Daily.    clonazePAM (KlonoPIN) 0.5 MG tablet 3 tablet 0     Sig: Take 1 tablet by mouth Daily for 3 days.      Last office visit with prescribing clinician: 9/19/2023   Last telemedicine visit with prescribing clinician: Visit date not found   Next office visit with prescribing clinician: 12/21/2023           Adrianna Myers MA  10/09/23, 14:36 EDT

## 2023-10-16 RX ORDER — QUETIAPINE FUMARATE 25 MG/1
12.5 TABLET, FILM COATED ORAL NIGHTLY
Qty: 30 TABLET | Refills: 2 | Status: SHIPPED | OUTPATIENT
Start: 2023-10-16 | End: 2023-10-18 | Stop reason: SDUPTHER

## 2023-10-17 ENCOUNTER — TELEPHONE (OUTPATIENT)
Dept: INTERNAL MEDICINE | Facility: CLINIC | Age: 78
End: 2023-10-17

## 2023-10-17 NOTE — TELEPHONE ENCOUNTER
Caller: Malu Aguilar    Relationship: Emergency Contact    Best call back number:  [phone]    Requested Prescriptions:   Requested Prescriptions      No prescriptions requested or ordered in this encounter      clonazePAM (KlonoPIN) 0.5 MG tablet     Pharmacy where request should be sent:      [facility] - [id number] - [place] [address] [place]  - [phone]  - [phone] FX       Last office visit with prescribing clinician: 9/19/2023   Last telemedicine visit with prescribing clinician: Visit date not found   Next office visit with prescribing clinician: 12/21/2023     Additional details provided by patient:     PHARMACY DID NOT GET PRESCRIPTION.  PLEASE RE-SEND TO [place].  PATIENT IS COMPLETELY OUT OF MEDICATION    Does the patient have less than a 3 day supply:  [x] Yes  [] No    Would you like a call back once the refill request has been completed: [] Yes [x] No    If the office needs to give you a call back, can they leave a voicemail: [] Yes [x] No    Samira Sanches, DARINEL   10/17/23 12:25 EDT

## 2023-10-18 DIAGNOSIS — F41.9 ANXIETY: ICD-10-CM

## 2023-10-18 RX ORDER — QUETIAPINE FUMARATE 25 MG/1
12.5 TABLET, FILM COATED ORAL NIGHTLY
Qty: 30 TABLET | Refills: 2 | Status: SHIPPED | OUTPATIENT
Start: 2023-10-18

## 2023-10-18 RX ORDER — CLONAZEPAM 0.5 MG/1
0.5 TABLET ORAL DAILY
Qty: 60 TABLET | Refills: 2 | Status: SHIPPED | OUTPATIENT
Start: 2023-10-18

## 2023-10-26 ENCOUNTER — HOSPITAL ENCOUNTER (OUTPATIENT)
Dept: INFUSION THERAPY | Facility: HOSPITAL | Age: 78
Discharge: HOME | End: 2023-10-26
Admitting: INTERNAL MEDICINE
Payer: MEDICARE

## 2023-10-26 VITALS
DIASTOLIC BLOOD PRESSURE: 73 MMHG | SYSTOLIC BLOOD PRESSURE: 137 MMHG | HEART RATE: 73 BPM | RESPIRATION RATE: 20 BRPM | TEMPERATURE: 98 F

## 2023-10-26 DIAGNOSIS — M81.0 AGE-RELATED OSTEOPOROSIS WITHOUT CURRENT PATHOLOGICAL FRACTURE: Primary | ICD-10-CM

## 2023-10-26 PROCEDURE — 96372 THER/PROPH/DIAG INJ SC/IM: CPT

## 2023-10-26 PROCEDURE — 25010000002 DENOSUMAB 60 MG/ML SOLUTION PREFILLED SYRINGE: Performed by: INTERNAL MEDICINE

## 2023-10-26 RX ADMIN — DENOSUMAB 60 MG: 60 INJECTION SUBCUTANEOUS at 15:31

## 2023-10-26 NOTE — CODE DOCUMENTATION
Medication guide given and patient verbalizes understanding. Patient states she is on vitamin d supplements and will start on calcium supplements.

## 2023-10-31 ENCOUNTER — OFFICE VISIT (OUTPATIENT)
Dept: INTERNAL MEDICINE | Facility: CLINIC | Age: 78
End: 2023-10-31
Payer: MEDICARE

## 2023-10-31 VITALS
OXYGEN SATURATION: 92 % | BODY MASS INDEX: 24.16 KG/M2 | SYSTOLIC BLOOD PRESSURE: 122 MMHG | HEIGHT: 65 IN | HEART RATE: 74 BPM | DIASTOLIC BLOOD PRESSURE: 82 MMHG | WEIGHT: 145 LBS | TEMPERATURE: 97 F

## 2023-10-31 DIAGNOSIS — R39.9 UTI SYMPTOMS: ICD-10-CM

## 2023-10-31 DIAGNOSIS — R30.0 BURNING WITH URINATION: Primary | ICD-10-CM

## 2023-10-31 LAB
BILIRUB BLD-MCNC: ABNORMAL MG/DL
CLARITY, POC: ABNORMAL
COLOR UR: ABNORMAL
EXPIRATION DATE: ABNORMAL
GLUCOSE UR STRIP-MCNC: NEGATIVE MG/DL
KETONES UR QL: ABNORMAL
LEUKOCYTE EST, POC: ABNORMAL
Lab: ABNORMAL
NITRITE UR-MCNC: NEGATIVE MG/ML
PH UR: 6 [PH] (ref 5–8)
PROT UR STRIP-MCNC: ABNORMAL MG/DL
RBC # UR STRIP: ABNORMAL /UL
SP GR UR: 1.03 (ref 1–1.03)
UROBILINOGEN UR QL: NORMAL

## 2023-10-31 RX ORDER — FLUCONAZOLE 150 MG/1
150 TABLET ORAL ONCE
Qty: 1 TABLET | Refills: 0 | Status: SHIPPED | OUTPATIENT
Start: 2023-10-31 | End: 2023-10-31

## 2023-10-31 RX ORDER — SULFAMETHOXAZOLE AND TRIMETHOPRIM 800; 160 MG/1; MG/1
1 TABLET ORAL 2 TIMES DAILY
Qty: 14 TABLET | Refills: 0 | Status: SHIPPED | OUTPATIENT
Start: 2023-10-31

## 2023-10-31 RX ORDER — CLOTRIMAZOLE AND BETAMETHASONE DIPROPIONATE 10; .64 MG/G; MG/G
1 CREAM TOPICAL 2 TIMES DAILY
Qty: 45 G | Refills: 0 | Status: SHIPPED | OUTPATIENT
Start: 2023-10-31

## 2023-10-31 NOTE — PROGRESS NOTES
Subjective   Gabi Dudley is a 78 y.o. female.     History of Present Illness  Patient presents with 2 weeks of dysuria.  Burning on urination.  Slight blood on the toilet paper when she wipes.  Flank pain.  Nausea.  Confusion, states that she missed Sunday altogether and thought that Monday was Sunday.      The following portions of the patient's history were reviewed and updated as appropriate: allergies, current medications, past family history, past medical history, past social history, past surgical history, and problem list.    Review of Systems   All other systems reviewed and are negative.      Objective   Physical Exam  Vitals and nursing note reviewed.   Constitutional:       Appearance: Normal appearance.   HENT:      Head: Normocephalic and atraumatic.      Right Ear: External ear normal.      Left Ear: External ear normal.      Nose: Nose normal.      Mouth/Throat:      Mouth: Mucous membranes are moist.      Pharynx: Oropharynx is clear. No oropharyngeal exudate or posterior oropharyngeal erythema.   Eyes:      Extraocular Movements: Extraocular movements intact.      Conjunctiva/sclera: Conjunctivae normal.      Pupils: Pupils are equal, round, and reactive to light.   Cardiovascular:      Rate and Rhythm: Normal rate and regular rhythm.      Pulses: Normal pulses.      Heart sounds: Normal heart sounds.   Pulmonary:      Effort: Pulmonary effort is normal.      Breath sounds: Normal breath sounds.   Abdominal:      General: Abdomen is flat. Bowel sounds are normal.      Palpations: Abdomen is soft.   Musculoskeletal:         General: Normal range of motion.      Cervical back: Normal range of motion.   Skin:     General: Skin is warm.      Capillary Refill: Capillary refill takes less than 2 seconds.   Neurological:      General: No focal deficit present.      Mental Status: She is alert and oriented to person, place, and time. Mental status is at baseline.   Psychiatric:         Mood and Affect:  Mood normal.         Behavior: Behavior normal.         Thought Content: Thought content normal.         Judgment: Judgment normal.         Assessment & Plan   Diagnoses and all orders for this visit:    1. Burning with urination (Primary)  -     POCT urinalysis dipstick, automated  -     Urine Culture - Urine, Urine, Clean Catch  -     sulfamethoxazole-trimethoprim (Bactrim DS) 800-160 MG per tablet; Take 1 tablet by mouth 2 (Two) Times a Day.  Dispense: 14 tablet; Refill: 0    2. UTI symptoms  -     sulfamethoxazole-trimethoprim (Bactrim DS) 800-160 MG per tablet; Take 1 tablet by mouth 2 (Two) Times a Day.  Dispense: 14 tablet; Refill: 0    Other orders  -     fluconazole (DIFLUCAN) 150 MG tablet; Take 1 tablet by mouth 1 (One) Time for 1 dose.  Dispense: 1 tablet; Refill: 0  -     clotrimazole-betamethasone (Lotrisone) 1-0.05 % cream; Apply 1 application  topically to the appropriate area as directed 2 (Two) Times a Day.  Dispense: 45 g; Refill: 0    UA was pan positive.  Start on Bactrim as that is what her last urine culture was sensitive to.  Urine culture sent.  Patient no longer having confusion today.  Seen and fluconazole as she gets yeast infections from Bactrim

## 2023-11-01 DIAGNOSIS — F41.9 ANXIETY: ICD-10-CM

## 2023-11-01 RX ORDER — SERTRALINE HYDROCHLORIDE 100 MG/1
TABLET, FILM COATED ORAL
Qty: 90 TABLET | Refills: 3 | Status: SHIPPED | OUTPATIENT
Start: 2023-11-01

## 2023-11-01 RX ORDER — DULOXETIN HYDROCHLORIDE 60 MG/1
60 CAPSULE, DELAYED RELEASE ORAL DAILY
Qty: 90 CAPSULE | Refills: 3
Start: 2023-11-01

## 2023-11-01 NOTE — TELEPHONE ENCOUNTER
Please verify with patient/family that she is truly taking both zoloft and cymbalta.  These two medications are not routinely prescribed together.

## 2023-11-01 NOTE — TELEPHONE ENCOUNTER
Rx Refill Note  Requested Prescriptions     Pending Prescriptions Disp Refills    sertraline (ZOLOFT) 100 MG tablet [Pharmacy Med Name: Sertraline HCl 100MG TABS] 45 tablet      Sig: TAKE 1 & 1/2 TABLETS BY MOUTH DAILY      Last office visit with prescribing clinician: 10/31/2023   Last telemedicine visit with prescribing clinician: Visit date not found   Next office visit with prescribing clinician: 12/21/2023                         Would you like a call back once the refill request has been completed: [] Yes [] No    If the office needs to give you a call back, can they leave a voicemail: [] Yes [] No    Deena Hunt MA  11/01/23, 12:12 EDT

## 2023-11-02 LAB
BACTERIA UR CULT: NORMAL
BACTERIA UR CULT: NORMAL

## 2023-11-02 NOTE — TELEPHONE ENCOUNTER
I would not have approved the refill for an entire year, only 3 months, as she is not my patient and I personally do not agree with both medications being prescribed together.  It is Dr. Saunders's decision if she stays on both medications long term since she is the patient's PCP.

## 2023-11-15 DIAGNOSIS — F41.9 ANXIETY: ICD-10-CM

## 2023-11-15 DIAGNOSIS — F51.04 PSYCHOPHYSIOLOGICAL INSOMNIA: ICD-10-CM

## 2023-11-15 NOTE — TELEPHONE ENCOUNTER
Rx Refill Note  Requested Prescriptions     Pending Prescriptions Disp Refills    pantoprazole (PROTONIX) 40 MG EC tablet [Pharmacy Med Name: Pantoprazole Sodium 40MG TBEC] 30 tablet      Sig: TAKE 1 TABLET BY MOUTH DAILY    traZODone (DESYREL) 100 MG tablet [Pharmacy Med Name: traZODone HCl 100MG TABS*] 30 tablet 2     Si EVERY NIGHT AT BEDTIME BY MOUTH AS NEEDED      Last office visit with prescribing clinician: 10/31/2023   Last telemedicine visit with prescribing clinician: Visit date not found   Next office visit with prescribing clinician: 2023                         Would you like a call back once the refill request has been completed: [] Yes [] No    If the office needs to give you a call back, can they leave a voicemail: [] Yes [] No    Deena Hunt MA  11/15/23, 11:56 EST

## 2023-11-15 NOTE — TELEPHONE ENCOUNTER
Caller: Malu Aguilar    Relationship: Emergency Contact    Best call back number: [phone]    Requested Prescriptions:   Requested Prescriptions     Pending Prescriptions Disp Refills    pantoprazole (PROTONIX) 40 MG EC tablet [Pharmacy Med Name: Pantoprazole Sodium 40MG TBEC] 30 tablet      Sig: TAKE 1 TABLET BY MOUTH DAILY    traZODone (DESYREL) 100 MG tablet [Pharmacy Med Name: traZODone HCl 100MG TABS*] 30 tablet 2     Si EVERY NIGHT AT BEDTIME BY MOUTH AS NEEDED    clonazePAM (KlonoPIN) 0.5 MG tablet 60 tablet 2     Sig: Take 1 tablet by mouth Daily.        Pharmacy where request should be sent: [facility] [id number] [facility] [address]  - [phone] [facility] [phone] FX     Last office visit with prescribing clinician: 10/31/2023   Last telemedicine visit with prescribing clinician: Visit date not found   Next office visit with prescribing clinician: 2023     Additional details provided by patient: PATIENT IS OUT OF THE KLONOPIN    Does the patient have less than a 3 day supply:  [x] Yes  [] No    Would you like a call back once the refill request has been completed: [x] Yes [] No    If the office needs to give you a call back, can they leave a voicemail: [x] Yes [] No    Rafa Herbert Rep   11/15/23 12:18 EST

## 2023-11-16 RX ORDER — CLONAZEPAM 0.5 MG/1
0.5 TABLET ORAL DAILY
Qty: 60 TABLET | Refills: 2 | OUTPATIENT
Start: 2023-11-16

## 2023-11-16 RX ORDER — TRAZODONE HYDROCHLORIDE 100 MG/1
TABLET ORAL
Qty: 30 TABLET | Refills: 2 | Status: SHIPPED | OUTPATIENT
Start: 2023-11-16

## 2023-11-16 RX ORDER — PANTOPRAZOLE SODIUM 40 MG/1
40 TABLET, DELAYED RELEASE ORAL DAILY
Qty: 90 TABLET | Refills: 3 | Status: SHIPPED | OUTPATIENT
Start: 2023-11-16

## 2023-11-16 NOTE — TELEPHONE ENCOUNTER
I sent in 3 month supply of clonazepam 4 weeks ago, should be able to call pharmacy for refill on that

## 2023-12-27 ENCOUNTER — TELEPHONE (OUTPATIENT)
Dept: INTERNAL MEDICINE | Facility: CLINIC | Age: 78
End: 2023-12-27
Payer: MEDICARE

## 2023-12-27 RX ORDER — FLUCONAZOLE 150 MG/1
150 TABLET ORAL ONCE
Qty: 2 TABLET | Refills: 0 | Status: SHIPPED | OUTPATIENT
Start: 2023-12-27 | End: 2023-12-27

## 2023-12-27 NOTE — TELEPHONE ENCOUNTER
Caller: Malu Aguilar    Relationship: Emergency Contact    Best call back number: [phone]     What medication are you requesting: DIFLUCAN TWO DOSES    What are your current symptoms: YEAST INFECTION    How long have you been experiencing symptoms: 4 TO 5 DAYS    Have you had these symptoms before:    [x] Yes  [] No    Have you been treated for these symptoms before:   [x] Yes  [] No    If a prescription is needed, what is your preferred pharmacy and phone number: [facility] [place] [place] [address] [address] [facility] [phone] [facility] [phone]      Additional notes: PATIENT IS ON AN ANTIBIOTIC.  PATIENT WENT TO URGENT CARE AND THEY DIDN'T PRESCRIBE THE DIFLUCAN.     
Malu notified of Rx sent.   
Okay to send?   
31-Jan-2023 18:27

## 2023-12-28 ENCOUNTER — TELEPHONE (OUTPATIENT)
Dept: CARDIOLOGY | Facility: CLINIC | Age: 78
End: 2023-12-28
Payer: MEDICARE

## 2023-12-28 NOTE — TELEPHONE ENCOUNTER
LMOM TO RESCHEDULE APPT DUE TO DR. JOHNSTON OUT OF THE OFFICE ON 1-2-24.    OFFICE CAN SCHEDULE WITH DR. BEAVER.

## 2024-01-09 ENCOUNTER — OFFICE VISIT (OUTPATIENT)
Dept: INTERNAL MEDICINE | Facility: CLINIC | Age: 79
End: 2024-01-09
Payer: MEDICARE

## 2024-01-09 VITALS
HEIGHT: 65 IN | SYSTOLIC BLOOD PRESSURE: 102 MMHG | OXYGEN SATURATION: 100 % | HEART RATE: 90 BPM | DIASTOLIC BLOOD PRESSURE: 70 MMHG | TEMPERATURE: 97.1 F | WEIGHT: 143 LBS | BODY MASS INDEX: 23.82 KG/M2

## 2024-01-09 DIAGNOSIS — R39.9 UTI SYMPTOMS: ICD-10-CM

## 2024-01-09 DIAGNOSIS — M17.0 PRIMARY OSTEOARTHRITIS OF BOTH KNEES: ICD-10-CM

## 2024-01-09 DIAGNOSIS — F41.9 ANXIETY: Primary | ICD-10-CM

## 2024-01-09 LAB
BILIRUB BLD-MCNC: ABNORMAL MG/DL
CLARITY, POC: CLEAR
COLOR UR: YELLOW
EXPIRATION DATE: ABNORMAL
GLUCOSE UR STRIP-MCNC: NEGATIVE MG/DL
KETONES UR QL: ABNORMAL
LEUKOCYTE EST, POC: ABNORMAL
Lab: ABNORMAL
NITRITE UR-MCNC: NEGATIVE MG/ML
PH UR: 6 [PH] (ref 5–8)
PROT UR STRIP-MCNC: ABNORMAL MG/DL
RBC # UR STRIP: ABNORMAL /UL
SP GR UR: 1.02 (ref 1–1.03)
UROBILINOGEN UR QL: ABNORMAL

## 2024-01-09 PROCEDURE — 3078F DIAST BP <80 MM HG: CPT | Performed by: STUDENT IN AN ORGANIZED HEALTH CARE EDUCATION/TRAINING PROGRAM

## 2024-01-09 PROCEDURE — 3074F SYST BP LT 130 MM HG: CPT | Performed by: STUDENT IN AN ORGANIZED HEALTH CARE EDUCATION/TRAINING PROGRAM

## 2024-01-09 PROCEDURE — 99214 OFFICE O/P EST MOD 30 MIN: CPT | Performed by: STUDENT IN AN ORGANIZED HEALTH CARE EDUCATION/TRAINING PROGRAM

## 2024-01-09 PROCEDURE — 81003 URINALYSIS AUTO W/O SCOPE: CPT | Performed by: STUDENT IN AN ORGANIZED HEALTH CARE EDUCATION/TRAINING PROGRAM

## 2024-01-09 RX ORDER — SULFAMETHOXAZOLE AND TRIMETHOPRIM 800; 160 MG/1; MG/1
1 TABLET ORAL 2 TIMES DAILY
Qty: 14 TABLET | Refills: 0 | Status: SHIPPED | OUTPATIENT
Start: 2024-01-09

## 2024-01-09 RX ORDER — CLONAZEPAM 1 MG/1
1 TABLET ORAL 2 TIMES DAILY PRN
Qty: 180 TABLET | Refills: 0 | Status: SHIPPED | OUTPATIENT
Start: 2024-01-09

## 2024-01-09 NOTE — PROGRESS NOTES
Subjective   Gabi Dudley is a 78 y.o. female.     History of Present Illness  Patient presents for follow-up on chronic conditions today as well as complaints  Anxiety-has been increasingly worse since her Klonopin was cut down.  She used to take a milligram of Klonopin and she was cut down to half of 1.  She states the milligram did not work wonders but she cannot even tell that she is taking the half.  States she has been having panic attacks a lot and a lot of anxiety.  Denies suicidal thoughts.  Osteoarthritis-has been increasingly worse with the cold weather.  States she has pain in all joints.  Patient is complaining on burning of urination.      The following portions of the patient's history were reviewed and updated as appropriate: allergies, current medications, past family history, past medical history, past social history, past surgical history, and problem list.    Review of Systems   All other systems reviewed and are negative.      Objective   Physical Exam  Vitals and nursing note reviewed.   Constitutional:       Appearance: Normal appearance.   HENT:      Head: Normocephalic and atraumatic.      Right Ear: External ear normal.      Left Ear: External ear normal.      Nose: Nose normal.      Mouth/Throat:      Mouth: Mucous membranes are moist.      Pharynx: Oropharynx is clear. No oropharyngeal exudate or posterior oropharyngeal erythema.   Eyes:      Extraocular Movements: Extraocular movements intact.      Conjunctiva/sclera: Conjunctivae normal.      Pupils: Pupils are equal, round, and reactive to light.   Cardiovascular:      Rate and Rhythm: Normal rate and regular rhythm.      Pulses: Normal pulses.      Heart sounds: Normal heart sounds.   Pulmonary:      Effort: Pulmonary effort is normal.      Breath sounds: Normal breath sounds.   Abdominal:      General: Abdomen is flat. Bowel sounds are normal.      Palpations: Abdomen is soft.   Musculoskeletal:         General: Normal range of  motion.      Cervical back: Normal range of motion.   Skin:     General: Skin is warm.      Capillary Refill: Capillary refill takes less than 2 seconds.   Neurological:      General: No focal deficit present.      Mental Status: She is alert and oriented to person, place, and time. Mental status is at baseline.   Psychiatric:         Mood and Affect: Mood normal.         Behavior: Behavior normal.         Thought Content: Thought content normal.         Judgment: Judgment normal.         Assessment & Plan   Diagnoses and all orders for this visit:    1. Anxiety (Primary)  -     clonazePAM (KlonoPIN) 1 MG tablet; Take 1 tablet by mouth 2 (Two) Times a Day As Needed for Anxiety.  Dispense: 180 tablet; Refill: 0    2. Primary osteoarthritis of both knees  -     Diclofenac Sodium (VOLTAREN) 1 % gel gel; Apply 4 g topically to the appropriate area as directed 4 (Four) Times a Day As Needed (pain).  Dispense: 350 g; Refill: 11    3. UTI symptoms  -     POCT urinalysis dipstick, automated  -     sulfamethoxazole-trimethoprim (Bactrim DS) 800-160 MG per tablet; Take 1 tablet by mouth 2 (Two) Times a Day.  Dispense: 14 tablet; Refill: 0  -     Urine Culture - Urine, Urine, Clean Catch; Future    Increased dose of Klonopin due to patient's anxiety being uncontrolled and Klonopin working previously at the higher dose  Diclofenac gel for osteoarthritis  UA today pan positive  Culture ordered

## 2024-01-12 LAB
BACTERIA UR CULT: NORMAL

## 2024-01-15 RX ORDER — FLUCONAZOLE 100 MG/1
100 TABLET ORAL DAILY
Qty: 3 TABLET | Refills: 0 | Status: SHIPPED | OUTPATIENT
Start: 2024-01-15

## 2024-01-25 ENCOUNTER — APPOINTMENT (OUTPATIENT)
Dept: GENERAL RADIOLOGY | Facility: HOSPITAL | Age: 79
End: 2024-01-25
Payer: MEDICARE

## 2024-01-25 ENCOUNTER — APPOINTMENT (OUTPATIENT)
Dept: CT IMAGING | Facility: HOSPITAL | Age: 79
End: 2024-01-25
Payer: MEDICARE

## 2024-01-25 ENCOUNTER — HOSPITAL ENCOUNTER (EMERGENCY)
Facility: HOSPITAL | Age: 79
Discharge: HOME | End: 2024-01-25
Attending: STUDENT IN AN ORGANIZED HEALTH CARE EDUCATION/TRAINING PROGRAM
Payer: MEDICARE

## 2024-01-25 VITALS
HEIGHT: 65 IN | RESPIRATION RATE: 18 BRPM | HEART RATE: 59 BPM | OXYGEN SATURATION: 98 % | WEIGHT: 143 LBS | TEMPERATURE: 97.5 F | DIASTOLIC BLOOD PRESSURE: 65 MMHG | SYSTOLIC BLOOD PRESSURE: 109 MMHG | BODY MASS INDEX: 23.82 KG/M2

## 2024-01-25 DIAGNOSIS — S80.00XA CONTUSION OF KNEE, UNSPECIFIED LATERALITY, INITIAL ENCOUNTER: Primary | ICD-10-CM

## 2024-01-25 LAB
ALBUMIN SERPL-MCNC: 4 G/DL (ref 3.5–5.2)
ALBUMIN/GLOB SERPL: 1.7 G/DL
ALP SERPL-CCNC: 67 U/L (ref 39–117)
ALT SERPL W P-5'-P-CCNC: 21 U/L (ref 1–33)
ANION GAP SERPL CALCULATED.3IONS-SCNC: 4.4 MMOL/L (ref 5–15)
AST SERPL-CCNC: 24 U/L (ref 1–32)
BASOPHILS # BLD AUTO: 0.03 10*3/MM3 (ref 0–0.2)
BASOPHILS NFR BLD AUTO: 0.4 % (ref 0–1.5)
BILIRUB SERPL-MCNC: 0.2 MG/DL (ref 0–1.2)
BUN SERPL-MCNC: 30 MG/DL (ref 8–23)
BUN/CREAT SERPL: 49.2 (ref 7–25)
CALCIUM SPEC-SCNC: 8.4 MG/DL (ref 8.6–10.5)
CHLORIDE SERPL-SCNC: 96 MMOL/L (ref 98–107)
CO2 SERPL-SCNC: 38.6 MMOL/L (ref 22–29)
CREAT SERPL-MCNC: 0.61 MG/DL (ref 0.57–1)
DEPRECATED RDW RBC AUTO: 49.7 FL (ref 37–54)
EGFRCR SERPLBLD CKD-EPI 2021: 92.8 ML/MIN/1.73
EOSINOPHIL # BLD AUTO: 0.1 10*3/MM3 (ref 0–0.4)
EOSINOPHIL NFR BLD AUTO: 1.5 % (ref 0.3–6.2)
ERYTHROCYTE [DISTWIDTH] IN BLOOD BY AUTOMATED COUNT: 15.9 % (ref 12.3–15.4)
GLOBULIN UR ELPH-MCNC: 2.3 GM/DL
GLUCOSE SERPL-MCNC: 95 MG/DL (ref 65–99)
HCT VFR BLD AUTO: 30.9 % (ref 34–46.6)
HGB BLD-MCNC: 9 G/DL (ref 12–15.9)
IMM GRANULOCYTES # BLD AUTO: 0.01 10*3/MM3 (ref 0–0.05)
IMM GRANULOCYTES NFR BLD AUTO: 0.1 % (ref 0–0.5)
LYMPHOCYTES # BLD AUTO: 1.49 10*3/MM3 (ref 0.7–3.1)
LYMPHOCYTES NFR BLD AUTO: 22.2 % (ref 19.6–45.3)
MCH RBC QN AUTO: 25.3 PG (ref 26.6–33)
MCHC RBC AUTO-ENTMCNC: 29.1 G/DL (ref 31.5–35.7)
MCV RBC AUTO: 86.8 FL (ref 79–97)
MONOCYTES # BLD AUTO: 0.49 10*3/MM3 (ref 0.1–0.9)
MONOCYTES NFR BLD AUTO: 7.3 % (ref 5–12)
NEUTROPHILS NFR BLD AUTO: 4.59 10*3/MM3 (ref 1.7–7)
NEUTROPHILS NFR BLD AUTO: 68.5 % (ref 42.7–76)
NRBC BLD AUTO-RTO: 0 /100 WBC (ref 0–0.2)
PLATELET # BLD AUTO: 149 10*3/MM3 (ref 140–450)
PMV BLD AUTO: 10.5 FL (ref 6–12)
POTASSIUM SERPL-SCNC: 4.5 MMOL/L (ref 3.5–5.2)
PROT SERPL-MCNC: 6.3 G/DL (ref 6–8.5)
RBC # BLD AUTO: 3.56 10*6/MM3 (ref 3.77–5.28)
SODIUM SERPL-SCNC: 139 MMOL/L (ref 136–145)
WBC NRBC COR # BLD AUTO: 6.71 10*3/MM3 (ref 3.4–10.8)

## 2024-01-25 PROCEDURE — 36415 COLL VENOUS BLD VENIPUNCTURE: CPT

## 2024-01-25 PROCEDURE — 80053 COMPREHEN METABOLIC PANEL: CPT | Performed by: NURSE PRACTITIONER

## 2024-01-25 PROCEDURE — 73562 X-RAY EXAM OF KNEE 3: CPT

## 2024-01-25 PROCEDURE — 99284 EMERGENCY DEPT VISIT MOD MDM: CPT

## 2024-01-25 PROCEDURE — 85025 COMPLETE CBC W/AUTO DIFF WBC: CPT | Performed by: NURSE PRACTITIONER

## 2024-01-25 PROCEDURE — 70450 CT HEAD/BRAIN W/O DYE: CPT

## 2024-01-25 NOTE — ED PROVIDER NOTES
Subjective  History of Present Illness:    Chief Complaint: Bilateral knee pain, headache  History of Present Illness: This is a 78-year-old female patient comes into the ED accompanied by family member with complaints of bilateral knee pain, headache after a ground-level fall that happened earlier today.  Patient states she did have loss of consciousness, does take Eliquis for atrial fibrillation.      Nurses Notes reviewed and agree, including vitals, allergies, social history and prior medical history.       Allergies:    Morphine, Ativan [lorazepam], and Doxycycline      Past Surgical History:   Procedure Laterality Date    APPENDECTOMY  1980s    CARDIAC CATHETERIZATION  2003    CATARACT EXTRACTION  2013    both eyes    CHOLECYSTECTOMY  2020    CHOLECYSTECTOMY WITH INTRAOPERATIVE CHOLANGIOGRAM N/A 10/30/2020    Procedure: CHOLECYSTECTOMY LAPAROSCOPIC INTRAOPERATIVE CHOLANGIOGRAPHY;  Surgeon: Sima Moses MD;  Location: [facility] OR;  Service: General;  Laterality: N/A;    COLON SURGERY  2020    COLONOSCOPY  03/11/2013    COLONOSCOPY  06/21/2016    COLONOSCOPY N/A 07/24/2019    Procedure: COLONOSCOPY W/ COLD FORCEP POLYPECTOMIES; HOT SNARE POLYPECTOMIES; COLD SNARE POLYPECTOMY;  Surgeon: Goyo Nunez MD;  Location: [facility] ENDOSCOPY;  Service: Gastroenterology    COLONOSCOPY W/ BIOPSIES AND POLYPECTOMY      EXPLORATORY LAPAROTOMY N/A 11/23/2020    Procedure: colectomy, right, closure of enterotomy x 2, reduction of internal volvulus;  Surgeon: Sima Moses MD;  Location: [facility] OR;  Service: General;  Laterality: N/A;    EXPLORATORY LAPAROTOMY N/A 8/9/2023    Procedure: LAPAROTOMY EXPLORATORY WITH LYIS OF ADHESIONS AND  CENTRAL LINE INSERTION;  Surgeon: Bianca Isaac DO;  Location: [facility] OR;  Service: General;  Laterality: N/A;    HYSTERECTOMY  1980s    partial    LYSIS OF ABDOMINAL ADHESIONS      TONSILLECTOMY  1987    UPPER GASTROINTESTINAL ENDOSCOPY  01/13/2015    VAGINAL DELIVERY      x2          Social History     Socioeconomic History    Marital status:    Tobacco Use    Smoking status: Former     Packs/day: 0.25     Years: 30.00     Additional pack years: 0.00     Total pack years: 7.50     Types: Cigarettes     Quit date: 11/1/2020     Years since quitting: 3.2     Passive exposure: Past    Smokeless tobacco: Never   Vaping Use    Vaping Use: Never used   Substance and Sexual Activity    Alcohol use: No    Drug use: No    Sexual activity: Not Currently     Birth control/protection: Post-menopausal         Family History   Problem Relation Age of Onset    Stomach cancer Brother     Colon cancer Maternal Aunt     Brain cancer Father     Arthritis Father     Hypertension Father     Cancer Father         Father, brother, and aunt    Lung cancer Paternal Grandfather     Heart disease Mother         Mother passed away due to heart disease    Breast cancer Neg Hx     Ovarian cancer Neg Hx        REVIEW OF SYSTEMS: All systems reviewed and not pertinent unless noted.    Review of Systems   Musculoskeletal:  Positive for arthralgias and myalgias.   Neurological:  Positive for headaches.   All other systems reviewed and are negative.      Objective    Physical Exam  Vitals and nursing note reviewed.   Constitutional:       Appearance: Normal appearance.   HENT:      Head: Normocephalic and atraumatic.   Eyes:      Extraocular Movements: Extraocular movements intact.      Pupils: Pupils are equal, round, and reactive to light.   Cardiovascular:      Rate and Rhythm: Normal rate and regular rhythm.      Pulses: Normal pulses.      Heart sounds: Normal heart sounds.   Pulmonary:      Effort: Pulmonary effort is normal.      Breath sounds: Normal breath sounds.   Abdominal:      General: Abdomen is flat. Bowel sounds are normal.      Palpations: Abdomen is soft.   Musculoskeletal:         General: Tenderness present.      Cervical back: Normal range of motion and neck supple.      Comments: Mild  tenderness to palpation bilateral knee   Skin:     Capillary Refill: Capillary refill takes less than 2 seconds.   Neurological:      General: No focal deficit present.      Mental Status: She is alert and oriented to person, place, and time. Mental status is at baseline.      GCS: GCS eye subscore is 4. GCS verbal subscore is 5. GCS motor subscore is 6.      Sensory: Sensation is intact.      Motor: Motor function is intact.      Gait: Gait is intact.   Psychiatric:         Attention and Perception: Attention and perception normal.         Mood and Affect: Mood and affect normal.         Speech: Speech normal.         Behavior: Behavior normal. Behavior is cooperative.           Procedures    ED Course:         Lab Results (last 24 hours)       Procedure Component Value Units Date/Time    CBC & Differential [[id number]]  (Abnormal) Collected: 01/25/24 1519    Specimen: Blood Updated: 01/25/24 1551    Narrative:      The following orders were created for panel order CBC & Differential.  Procedure                               Abnormality         Status                     ---------                               -----------         ------                     CBC Auto Differential[[id number]]        Abnormal            Final result               Scan Slide[[id number]]                                                                    Please view results for these tests on the individual orders.    Comprehensive Metabolic Panel [[id number]]  (Abnormal) Collected: 01/25/24 1519    Specimen: Blood Updated: 01/25/24 1541     Glucose 95 mg/dL      BUN 30 mg/dL      Creatinine 0.61 mg/dL      Sodium 139 mmol/L      Potassium 4.5 mmol/L      Chloride 96 mmol/L      CO2 38.6 mmol/L      Calcium 8.4 mg/dL      Total Protein 6.3 g/dL      Albumin 4.0 g/dL      ALT (SGPT) 21 U/L      AST (SGOT) 24 U/L      Alkaline Phosphatase 67 U/L      Total Bilirubin 0.2 mg/dL      Globulin 2.3 gm/dL      A/G Ratio 1.7 g/dL      BUN/Creatinine  Ratio 49.2     Anion Gap 4.4 mmol/L      eGFR 92.8 mL/min/1.73     Narrative:      GFR Normal >60  Chronic Kidney Disease <60  Kidney Failure <15    The GFR formula is only valid for adults with stable renal function between ages 18 and 70.    CBC Auto Differential [[id number]]  (Abnormal) Collected: 01/25/24 1519    Specimen: Blood Updated: 01/25/24 1551     WBC 6.71 10*3/mm3      RBC 3.56 10*6/mm3      Hemoglobin 9.0 g/dL      Hematocrit 30.9 %      MCV 86.8 fL      MCH 25.3 pg      MCHC 29.1 g/dL      RDW 15.9 %      RDW-SD 49.7 fl      MPV 10.5 fL      Platelets 149 10*3/mm3      Neutrophil % 68.5 %      Lymphocyte % 22.2 %      Monocyte % 7.3 %      Eosinophil % 1.5 %      Basophil % 0.4 %      Immature Grans % 0.1 %      Neutrophils, Absolute 4.59 10*3/mm3      Lymphocytes, Absolute 1.49 10*3/mm3      Monocytes, Absolute 0.49 10*3/mm3      Eosinophils, Absolute 0.10 10*3/mm3      Basophils, Absolute 0.03 10*3/mm3      Immature Grans, Absolute 0.01 10*3/mm3      nRBC 0.0 /100 WBC              CT Head Without Contrast    Result Date: 1/25/2024  PROCEDURE: CT HEAD WO CONTRAST-  HISTORY: Ground-level fall with anticoagulation and headache  COMPARISON: None.  TECHNIQUE: Multiple axial CT images were performed from the foramen magnum to the vertex. Individualized dose reduction techniques using automated exposure control or adjustment of mA and/or kV according to the patient size were employed.  FINDINGS: There is moderate, age-appropriate generalized cerebral atrophy. The ventricles are enlarged. There is asymmetry of the frontal horns of the lateral ventricles, right smaller than left; suspect this is a chronic finding. No mass identified in the adjacent parenchyma. Mild small vessel ischemic disease noted. There is no evidence of edema or hemorrhage.  No masses are identified. No extra-axial fluid is seen. The paranasal sinuses are unremarkable.      Impression: Atrophy  without acute process.     CTDI: 32.69 mGy  DLP:606.97 mGy.cm  This report was signed and finalized on 1/25/2024 4:21 PM by Angeles Collins MD.      XR Knee 3 View Bilateral    Result Date: 1/25/2024  PROCEDURE: XR KNEE 3 VW BILATERAL-  History: Bilateral knee pain after ground-level fall  COMPARISON: None.  FINDINGS:  A 3 view exam demonstrates no acute fracture or dislocation. There is tricompartmental joint space narrowing bilaterally. There are articular calcifications bilaterally. No soft tissue abnormality is seen. There is no joint effusion.      Impression: No acute fracture.       Images were reviewed, interpreted, and dictated by Dr. Angeles Collins MD Transcribed by Reyna Gerard PA-C.  This report was signed and finalized on 1/25/2024 3:39 PM by Angeles Collins MD.        Medical Decision Making  This is a 78-year-old female patient comes into the ED accompanied by family member with complaints of bilateral knee pain, headache after a ground-level fall that happened earlier today.  Patient states she did have loss of consciousness, does take Eliquis for atrial fibrillation.    DDX: includes but is not limited to: Knee contusion, knee fracture, intracranial hemorrhage, other    Problems Addressed:  Contusion of knee, unspecified laterality, initial encounter: complicated acute illness or injury    Amount and/or Complexity of Data Reviewed  External Data Reviewed:      Details: I have personally reviewed labs, radiology EKG and notes from patient's chart  Labs: ordered. Decision-making details documented in ED Course.     Details: I have personally reviewed and documented all results  Radiology: ordered. Decision-making details documented in ED Course.     Details: I have personally reviewed and documented all results  Discussion of management or test interpretation with external provider(s): Discussed assessment, treatment and plan with ER attending    Risk  Risk Details: I have discussed with patient the finding of the test preformed today. Patient has  been diagnosed with contusion of the knee and will be discharged home.  Patient requested to follow-up with primary care provider within the next 7 days for reevaluation. Strict return precautions have been given and patient verbalizes understanding                  Final diagnoses:   Contusion of knee, unspecified laterality, initial encounter          Stanford Rhodes, REGINA  01/25/24 1713

## 2024-02-07 DIAGNOSIS — F41.9 ANXIETY: ICD-10-CM

## 2024-02-07 RX ORDER — DULOXETIN HYDROCHLORIDE 60 MG/1
60 CAPSULE, DELAYED RELEASE ORAL 2 TIMES DAILY
Qty: 60 CAPSULE | Refills: 1 | Status: SHIPPED | OUTPATIENT
Start: 2024-02-07

## 2024-02-12 DIAGNOSIS — F51.04 PSYCHOPHYSIOLOGICAL INSOMNIA: ICD-10-CM

## 2024-02-12 RX ORDER — TRAZODONE HYDROCHLORIDE 100 MG/1
TABLET ORAL
Qty: 30 TABLET | Refills: 2 | Status: SHIPPED | OUTPATIENT
Start: 2024-02-12

## 2024-02-26 DIAGNOSIS — J30.1 NON-SEASONAL ALLERGIC RHINITIS DUE TO POLLEN: ICD-10-CM

## 2024-02-26 RX ORDER — CETIRIZINE HYDROCHLORIDE 10 MG/1
10 TABLET ORAL DAILY
Qty: 90 TABLET | Refills: 3 | Status: SHIPPED | OUTPATIENT
Start: 2024-02-26

## 2024-03-11 ENCOUNTER — TELEPHONE (OUTPATIENT)
Dept: CASE MANAGEMENT | Facility: OTHER | Age: 79
End: 2024-03-11
Payer: MEDICARE

## 2024-03-21 ENCOUNTER — PATIENT OUTREACH (OUTPATIENT)
Dept: CASE MANAGEMENT | Facility: OTHER | Age: 79
End: 2024-03-21
Payer: MEDICARE

## 2024-03-21 DIAGNOSIS — I48.0 PAROXYSMAL ATRIAL FIBRILLATION: ICD-10-CM

## 2024-03-21 DIAGNOSIS — I10 ESSENTIAL HYPERTENSION: Primary | ICD-10-CM

## 2024-03-21 NOTE — OUTREACH NOTE
AMBULATORY CASE MANAGEMENT NOTE    Name and Relationship of Patient/Support Person: Malu Aguilar - Emergency Contact  Emergency Contact    Patient Outreach    AMB CM outreach with Patient's daughter. Patient was identified for pro active outreach. Purpose and benefit of HRCM/CCM program explained. Patient expressed appreciation for outreach and declined participation at this time. Took name and direct line to amb cm if any needs arise.     Education Documentation  No documentation found.        Jessica LEWIS  Ambulatory Case Management    3/21/2024, 11:12 EDT

## 2024-04-02 DIAGNOSIS — I48.0 PAROXYSMAL ATRIAL FIBRILLATION: ICD-10-CM

## 2024-04-02 NOTE — TELEPHONE ENCOUNTER
Eliquis samples given to pt on 04/02/2024  Qty 2   Lot # [id number]  Exp. Date: 7/2025  Patient assistance paperwork provided.

## 2024-04-08 DIAGNOSIS — R39.9 UTI SYMPTOMS: ICD-10-CM

## 2024-04-08 RX ORDER — SULFAMETHOXAZOLE AND TRIMETHOPRIM 800; 160 MG/1; MG/1
1 TABLET ORAL 2 TIMES DAILY
Qty: 14 TABLET | Refills: 0 | Status: SHIPPED | OUTPATIENT
Start: 2024-04-08

## 2024-04-08 NOTE — TELEPHONE ENCOUNTER
Rx Refill Note  Requested Prescriptions     Pending Prescriptions Disp Refills    sulfamethoxazole-trimethoprim (Bactrim DS) 800-160 MG per tablet 14 tablet 0     Sig: Take 1 tablet by mouth 2 (Two) Times a Day.      Last office visit with prescribing clinician: 1/9/2024   Last telemedicine visit with prescribing clinician: Visit date not found   Next office visit with prescribing clinician: Visit date not found     Deena Hunt MA  04/08/24, 14:52 EDT

## 2024-04-19 DIAGNOSIS — I48.0 PAROXYSMAL ATRIAL FIBRILLATION: ICD-10-CM

## 2024-04-26 ENCOUNTER — TELEPHONE (OUTPATIENT)
Dept: PULMONOLOGY | Facility: CLINIC | Age: 79
End: 2024-04-26
Payer: MEDICARE

## 2024-04-26 DIAGNOSIS — J42 CHRONIC BRONCHITIS, UNSPECIFIED CHRONIC BRONCHITIS TYPE: Primary | ICD-10-CM

## 2024-04-26 NOTE — TELEPHONE ENCOUNTER
Caller: Malu Aguilar    Relationship to patient: Emergency Contact    Best call back number: [phone] (home) [phone]      Patient is needing: PT REQUEST A RX BE SENT TO [facility] FOR A NEBULIZER MASK.

## 2024-04-29 RX ORDER — CLOTRIMAZOLE AND BETAMETHASONE DIPROPIONATE 10; .64 MG/G; MG/G
1 CREAM TOPICAL 2 TIMES DAILY
Qty: 45 G | Refills: 0 | Status: SHIPPED | OUTPATIENT
Start: 2024-04-29

## 2024-04-29 RX ORDER — ATORVASTATIN CALCIUM 20 MG/1
20 TABLET, FILM COATED ORAL DAILY
Qty: 90 TABLET | Refills: 3 | Status: SHIPPED | OUTPATIENT
Start: 2024-04-29

## 2024-04-29 RX ORDER — QUETIAPINE FUMARATE 25 MG/1
12.5 TABLET, FILM COATED ORAL NIGHTLY
Qty: 30 TABLET | Refills: 2 | Status: SHIPPED | OUTPATIENT
Start: 2024-04-29

## 2024-04-29 NOTE — TELEPHONE ENCOUNTER
Rx Refill Note  Requested Prescriptions     Pending Prescriptions Disp Refills    atorvastatin (LIPITOR) 20 MG tablet 90 tablet 3     Sig: Take 1 tablet by mouth Daily.    QUEtiapine (SEROquel) 25 MG tablet 30 tablet 2     Sig: Take 0.5 tablets by mouth Every Night.    clotrimazole-betamethasone (Lotrisone) 1-0.05 % cream 45 g 0     Sig: Apply 1 Application topically to the appropriate area as directed 2 (Two) Times a Day.      Last office visit with prescribing clinician: 1/9/2024   Last telemedicine visit with prescribing clinician: Visit date not found   Next office visit with prescribing clinician: Visit date not found       Deena Hunt MA  04/29/24, 08:24 EDT

## 2024-05-06 ENCOUNTER — OFFICE VISIT (OUTPATIENT)
Dept: CARDIOLOGY | Facility: CLINIC | Age: 79
End: 2024-05-06
Payer: MEDICARE

## 2024-05-06 VITALS — HEART RATE: 64 BPM | OXYGEN SATURATION: 97 % | DIASTOLIC BLOOD PRESSURE: 52 MMHG | SYSTOLIC BLOOD PRESSURE: 106 MMHG

## 2024-05-06 DIAGNOSIS — F51.04 PSYCHOPHYSIOLOGICAL INSOMNIA: ICD-10-CM

## 2024-05-06 DIAGNOSIS — I48.0 PAROXYSMAL ATRIAL FIBRILLATION: ICD-10-CM

## 2024-05-06 DIAGNOSIS — I25.10 CORONARY ARTERY DISEASE INVOLVING NATIVE CORONARY ARTERY OF NATIVE HEART WITHOUT ANGINA PECTORIS: ICD-10-CM

## 2024-05-06 DIAGNOSIS — I10 ESSENTIAL HYPERTENSION: ICD-10-CM

## 2024-05-06 DIAGNOSIS — I48.0 PAROXYSMAL ATRIAL FIBRILLATION: Primary | ICD-10-CM

## 2024-05-06 PROCEDURE — 93000 ELECTROCARDIOGRAM COMPLETE: CPT | Performed by: INTERNAL MEDICINE

## 2024-05-06 PROCEDURE — 3078F DIAST BP <80 MM HG: CPT | Performed by: INTERNAL MEDICINE

## 2024-05-06 PROCEDURE — 3074F SYST BP LT 130 MM HG: CPT | Performed by: INTERNAL MEDICINE

## 2024-05-06 PROCEDURE — 99214 OFFICE O/P EST MOD 30 MIN: CPT | Performed by: INTERNAL MEDICINE

## 2024-05-06 RX ORDER — TRAZODONE HYDROCHLORIDE 100 MG/1
TABLET ORAL
Qty: 90 TABLET | Refills: 3 | Status: SHIPPED | OUTPATIENT
Start: 2024-05-06

## 2024-05-31 DIAGNOSIS — I48.0 PAROXYSMAL ATRIAL FIBRILLATION: ICD-10-CM

## 2024-06-05 ENCOUNTER — OFFICE VISIT (OUTPATIENT)
Dept: INTERNAL MEDICINE | Facility: CLINIC | Age: 79
End: 2024-06-05
Payer: MEDICARE

## 2024-06-05 VITALS
HEART RATE: 88 BPM | HEIGHT: 65 IN | SYSTOLIC BLOOD PRESSURE: 120 MMHG | DIASTOLIC BLOOD PRESSURE: 68 MMHG | RESPIRATION RATE: 18 BRPM | WEIGHT: 150 LBS | OXYGEN SATURATION: 95 % | BODY MASS INDEX: 24.99 KG/M2 | TEMPERATURE: 96.2 F

## 2024-06-05 DIAGNOSIS — J44.1 COPD EXACERBATION: ICD-10-CM

## 2024-06-05 DIAGNOSIS — H81.13 BENIGN PAROXYSMAL POSITIONAL VERTIGO DUE TO BILATERAL VESTIBULAR DISORDER: Primary | ICD-10-CM

## 2024-06-05 PROCEDURE — 99214 OFFICE O/P EST MOD 30 MIN: CPT | Performed by: NURSE PRACTITIONER

## 2024-06-05 PROCEDURE — 1160F RVW MEDS BY RX/DR IN RCRD: CPT | Performed by: NURSE PRACTITIONER

## 2024-06-05 PROCEDURE — 3074F SYST BP LT 130 MM HG: CPT | Performed by: NURSE PRACTITIONER

## 2024-06-05 PROCEDURE — 1125F AMNT PAIN NOTED PAIN PRSNT: CPT | Performed by: NURSE PRACTITIONER

## 2024-06-05 PROCEDURE — 1159F MED LIST DOCD IN RCRD: CPT | Performed by: NURSE PRACTITIONER

## 2024-06-05 PROCEDURE — 3078F DIAST BP <80 MM HG: CPT | Performed by: NURSE PRACTITIONER

## 2024-06-05 RX ORDER — AMOXICILLIN AND CLAVULANATE POTASSIUM 875; 125 MG/1; MG/1
1 TABLET, FILM COATED ORAL 2 TIMES DAILY
Qty: 10 TABLET | Refills: 0 | Status: SHIPPED | OUTPATIENT
Start: 2024-06-05 | End: 2024-06-10

## 2024-06-05 RX ORDER — PREDNISONE 20 MG/1
20 TABLET ORAL 2 TIMES DAILY
Qty: 10 TABLET | Refills: 0 | Status: SHIPPED | OUTPATIENT
Start: 2024-06-05

## 2024-06-05 NOTE — PROGRESS NOTES
"  Office Visit      Patient Name: Gabi Dudley  : 1945   MRN: [id number]   Care Team: Patient Care Team:  Krystina Saunders DO as PCP - General (Family Medicine)  Sima Moses MD as Consulting Physician (General Surgery)  Taiwo Curry MD as Consulting Physician (Cardiology)    Chief Complaint  Abstract (ENT REFERRAL ) and Nasal Congestion (Chronic bronchitis, x2 weeks. Making it hard to breath. /Pt says she feels like her chest is congested as well.  )    Subjective     Subjective      Gabi Dudley presents to [facility] for ENT referral/COPD exacerbation.     Presents with daughter who is helping give HPI.   Per daughter, cardiologist would like for patient to see ENT as she is having persistent dizziness with certain movements, especially turning her head side to side. Concerned about vertigo.  Her blood pressure is stable. She also gets frequent ear infections and fluid in ear. Does complain of popping sensation and pressure behind her ears.   Experiences episodes of dizziness a few times a day associated with nausea.  CT of head performed last year, was negative. Has not been to physical therapy or ENT.    Daughter also states that she has sinus congestion and chronic bronchitis, worsened over the past two weeks. Is having wheezing, worsening cough, and shortness of breath. She is on 4L continuous O2.  Has not tried any medications for this.   Denies any chest pain, fever, chills, and sore throat.     Objective     Objective   Vital Signs:   /68 (BP Location: Right arm, Patient Position: Sitting, Cuff Size: Small Adult)   Pulse 88   Temp 96.2 °F (35.7 °C)   Resp 18   Ht 165.1 cm (65\")   Wt 68 kg (150 lb)   SpO2 95%   BMI 24.96 kg/m²     Physical Exam  Vitals and nursing note reviewed.   Constitutional:       General: She is not in acute distress.     Appearance: Normal appearance. She is ill-appearing (Chronically ill-appearing). She is not " toxic-appearing.   HENT:      Right Ear: A middle ear effusion is present.   Eyes:      Pupils: Pupils are equal, round, and reactive to light.   Neck:      Vascular: No carotid bruit.   Cardiovascular:      Rate and Rhythm: Normal rate and regular rhythm.      Heart sounds: Normal heart sounds. No murmur heard.  Pulmonary:      Effort: Pulmonary effort is normal. No respiratory distress.      Breath sounds: Examination of the right-lower field reveals decreased breath sounds. Examination of the left-lower field reveals decreased breath sounds. Decreased breath sounds (lower lobes bilaterally) and wheezing present. No rales.   Abdominal:      General: Bowel sounds are normal. There is no distension.      Palpations: Abdomen is soft.      Tenderness: There is no abdominal tenderness.   Musculoskeletal:      Cervical back: Neck supple. No tenderness.      Comments: Wheelchair     Skin:     General: Skin is warm and dry.      Findings: Bruising present. No rash.   Neurological:      General: No focal deficit present.      Mental Status: She is alert and oriented to person, place, and time.      Comments: Unable to perform Alexandra-Hallpike maneuver however she does have significant dizziness with sudden head movements noted in the office   Psychiatric:         Mood and Affect: Mood is anxious. Affect is tearful.         Behavior: Behavior normal.          Assessment / Plan      Assessment & Plan   Problem List Items Addressed This Visit       COPD exacerbation    Relevant Medications    predniSONE (DELTASONE) 20 MG tablet    Augmentin BID x 5 days with steroid burst. Recommend monistat for self reported yeast infections. Complete entire course, take with food. Mucinex and push fluids. Continue prescribed inhalers.      Other Visit Diagnoses       Benign paroxysmal positional vertigo due to bilateral vestibular disorder    -  Primary    Relevant Orders    Ambulatory Referral to Physical Therapy    Ambulatory Referral to ENT  (Otolaryngology)    Would benefit from vestibular PT and ENT referral, placed today. Discussed pathophysiology behind disease process.            I spent 32 minutes caring for Gabi on this date of service. This time includes time spent by me in the following activities:preparing for the visit, reviewing tests, obtaining and/or reviewing a separately obtained history, performing a medically appropriate examination and/or evaluation , counseling and educating the patient/family/caregiver, ordering medications, tests, or procedures, and documenting information in the medical record    BMI is within normal parameters. No other follow-up for BMI required.    This note accurately reflects work and decisions made by me.    Follow Up   No follow-ups on file.  Patient was given instructions and counseling regarding her condition or for health maintenance advice. Please see specific information pulled into the AVS if appropriate.     REGINA Fortune  [facility]

## 2024-06-13 ENCOUNTER — HOSPITAL ENCOUNTER (EMERGENCY)
Facility: HOSPITAL | Age: 79
Discharge: HOME | End: 2024-06-13
Attending: EMERGENCY MEDICINE
Payer: MEDICARE

## 2024-06-13 ENCOUNTER — APPOINTMENT (OUTPATIENT)
Dept: CT IMAGING | Facility: HOSPITAL | Age: 79
End: 2024-06-13
Payer: MEDICARE

## 2024-06-13 VITALS
TEMPERATURE: 98.3 F | SYSTOLIC BLOOD PRESSURE: 132 MMHG | HEIGHT: 65 IN | WEIGHT: 150 LBS | BODY MASS INDEX: 24.99 KG/M2 | RESPIRATION RATE: 18 BRPM | DIASTOLIC BLOOD PRESSURE: 68 MMHG | HEART RATE: 73 BPM | OXYGEN SATURATION: 97 %

## 2024-06-13 DIAGNOSIS — S32.030A CLOSED COMPRESSION FRACTURE OF L3 VERTEBRA, INITIAL ENCOUNTER: Primary | ICD-10-CM

## 2024-06-13 LAB
ALBUMIN SERPL-MCNC: 3.8 G/DL (ref 3.5–5.2)
ALBUMIN/GLOB SERPL: 1.5 G/DL
ALP SERPL-CCNC: 129 U/L (ref 39–117)
ALT SERPL W P-5'-P-CCNC: 28 U/L (ref 1–33)
ANION GAP SERPL CALCULATED.3IONS-SCNC: 5.8 MMOL/L (ref 5–15)
AST SERPL-CCNC: 24 U/L (ref 1–32)
BACTERIA UR QL AUTO: ABNORMAL /HPF
BASOPHILS # BLD AUTO: 0.02 10*3/MM3 (ref 0–0.2)
BASOPHILS NFR BLD AUTO: 0.2 % (ref 0–1.5)
BILIRUB SERPL-MCNC: 0.2 MG/DL (ref 0–1.2)
BILIRUB UR QL STRIP: NEGATIVE
BUN SERPL-MCNC: 26 MG/DL (ref 8–23)
BUN/CREAT SERPL: 42.6 (ref 7–25)
CALCIUM SPEC-SCNC: 9.6 MG/DL (ref 8.6–10.5)
CHLORIDE SERPL-SCNC: 99 MMOL/L (ref 98–107)
CLARITY UR: CLEAR
CO2 SERPL-SCNC: 37.2 MMOL/L (ref 22–29)
COD CRY URNS QL: ABNORMAL /HPF
COLOR UR: YELLOW
CREAT SERPL-MCNC: 0.61 MG/DL (ref 0.57–1)
DEPRECATED RDW RBC AUTO: 46.1 FL (ref 37–54)
EGFRCR SERPLBLD CKD-EPI 2021: 92.2 ML/MIN/1.73
EOSINOPHIL # BLD AUTO: 0.01 10*3/MM3 (ref 0–0.4)
EOSINOPHIL NFR BLD AUTO: 0.1 % (ref 0.3–6.2)
ERYTHROCYTE [DISTWIDTH] IN BLOOD BY AUTOMATED COUNT: 14.4 % (ref 12.3–15.4)
GLOBULIN UR ELPH-MCNC: 2.6 GM/DL
GLUCOSE SERPL-MCNC: 81 MG/DL (ref 65–99)
GLUCOSE UR STRIP-MCNC: NEGATIVE MG/DL
HCT VFR BLD AUTO: 35.9 % (ref 34–46.6)
HGB BLD-MCNC: 10.9 G/DL (ref 12–15.9)
HGB UR QL STRIP.AUTO: ABNORMAL
HYALINE CASTS UR QL AUTO: ABNORMAL /LPF
IMM GRANULOCYTES # BLD AUTO: 0.08 10*3/MM3 (ref 0–0.05)
IMM GRANULOCYTES NFR BLD AUTO: 0.8 % (ref 0–0.5)
KETONES UR QL STRIP: ABNORMAL
LEUKOCYTE ESTERASE UR QL STRIP.AUTO: ABNORMAL
LYMPHOCYTES # BLD AUTO: 1.08 10*3/MM3 (ref 0.7–3.1)
LYMPHOCYTES NFR BLD AUTO: 10.2 % (ref 19.6–45.3)
MCH RBC QN AUTO: 26.9 PG (ref 26.6–33)
MCHC RBC AUTO-ENTMCNC: 30.4 G/DL (ref 31.5–35.7)
MCV RBC AUTO: 88.6 FL (ref 79–97)
MONOCYTES # BLD AUTO: 0.91 10*3/MM3 (ref 0.1–0.9)
MONOCYTES NFR BLD AUTO: 8.6 % (ref 5–12)
NEUTROPHILS NFR BLD AUTO: 8.53 10*3/MM3 (ref 1.7–7)
NEUTROPHILS NFR BLD AUTO: 80.1 % (ref 42.7–76)
NITRITE UR QL STRIP: NEGATIVE
NRBC BLD AUTO-RTO: 0 /100 WBC (ref 0–0.2)
PH UR STRIP.AUTO: 5.5 [PH] (ref 5–8)
PLATELET # BLD AUTO: 218 10*3/MM3 (ref 140–450)
PMV BLD AUTO: 10.3 FL (ref 6–12)
POTASSIUM SERPL-SCNC: 4.2 MMOL/L (ref 3.5–5.2)
PROT SERPL-MCNC: 6.4 G/DL (ref 6–8.5)
PROT UR QL STRIP: NEGATIVE
RBC # BLD AUTO: 4.05 10*6/MM3 (ref 3.77–5.28)
RBC # UR STRIP: ABNORMAL /HPF
REF LAB TEST METHOD: ABNORMAL
SODIUM SERPL-SCNC: 142 MMOL/L (ref 136–145)
SP GR UR STRIP: 1.02 (ref 1–1.03)
SQUAMOUS #/AREA URNS HPF: ABNORMAL /HPF
UROBILINOGEN UR QL STRIP: ABNORMAL
WBC # UR STRIP: ABNORMAL /HPF
WBC NRBC COR # BLD AUTO: 10.63 10*3/MM3 (ref 3.4–10.8)

## 2024-06-13 PROCEDURE — 96376 TX/PRO/DX INJ SAME DRUG ADON: CPT

## 2024-06-13 PROCEDURE — 74176 CT ABD & PELVIS W/O CONTRAST: CPT

## 2024-06-13 PROCEDURE — 96374 THER/PROPH/DIAG INJ IV PUSH: CPT

## 2024-06-13 PROCEDURE — 80053 COMPREHEN METABOLIC PANEL: CPT | Performed by: EMERGENCY MEDICINE

## 2024-06-13 PROCEDURE — 72131 CT LUMBAR SPINE W/O DYE: CPT

## 2024-06-13 PROCEDURE — 85025 COMPLETE CBC W/AUTO DIFF WBC: CPT | Performed by: EMERGENCY MEDICINE

## 2024-06-13 PROCEDURE — 96375 TX/PRO/DX INJ NEW DRUG ADDON: CPT

## 2024-06-13 PROCEDURE — 25010000002 ONDANSETRON PER 1 MG: Performed by: EMERGENCY MEDICINE

## 2024-06-13 PROCEDURE — 99284 EMERGENCY DEPT VISIT MOD MDM: CPT

## 2024-06-13 PROCEDURE — 81001 URINALYSIS AUTO W/SCOPE: CPT | Performed by: EMERGENCY MEDICINE

## 2024-06-13 PROCEDURE — 25010000002 FENTANYL CITRATE (PF) 50 MCG/ML SOLUTION: Performed by: EMERGENCY MEDICINE

## 2024-06-13 RX ORDER — CYCLOBENZAPRINE HCL 5 MG
5 TABLET ORAL 3 TIMES DAILY PRN
Qty: 21 TABLET | Refills: 0 | Status: SHIPPED | OUTPATIENT
Start: 2024-06-13

## 2024-06-13 RX ORDER — ONDANSETRON 2 MG/ML
4 INJECTION INTRAMUSCULAR; INTRAVENOUS ONCE
Status: COMPLETED | OUTPATIENT
Start: 2024-06-13 | End: 2024-06-13

## 2024-06-13 RX ORDER — SODIUM CHLORIDE 0.9 % (FLUSH) 0.9 %
10 SYRINGE (ML) INJECTION AS NEEDED
Status: DISCONTINUED | OUTPATIENT
Start: 2024-06-13 | End: 2024-06-13 | Stop reason: HOSPADM

## 2024-06-13 RX ORDER — FENTANYL CITRATE 50 UG/ML
50 INJECTION, SOLUTION INTRAMUSCULAR; INTRAVENOUS ONCE
Status: COMPLETED | OUTPATIENT
Start: 2024-06-13 | End: 2024-06-13

## 2024-06-13 RX ORDER — CYCLOBENZAPRINE HCL 10 MG
5 TABLET ORAL ONCE
Status: DISCONTINUED | OUTPATIENT
Start: 2024-06-13 | End: 2024-06-13

## 2024-06-13 RX ADMIN — ONDANSETRON 4 MG: 2 INJECTION INTRAMUSCULAR; INTRAVENOUS at 17:49

## 2024-06-13 RX ADMIN — FENTANYL CITRATE 50 MCG: 50 INJECTION, SOLUTION INTRAMUSCULAR; INTRAVENOUS at 17:49

## 2024-06-13 RX ADMIN — FENTANYL CITRATE 50 MCG: 50 INJECTION, SOLUTION INTRAMUSCULAR; INTRAVENOUS at 20:34

## 2024-06-14 NOTE — ED NOTES
Patient discharged at this time. Assisted from ED in wheelchair by ED staff, family at side, education in patients hands

## 2024-06-19 DIAGNOSIS — F41.9 ANXIETY: ICD-10-CM

## 2024-06-19 RX ORDER — CLONAZEPAM 1 MG/1
1 TABLET ORAL 2 TIMES DAILY PRN
Qty: 60 TABLET | Refills: 0 | Status: SHIPPED | OUTPATIENT
Start: 2024-06-19

## 2024-06-19 NOTE — TELEPHONE ENCOUNTER
Rx Refill Note  Requested Prescriptions     Pending Prescriptions Disp Refills    clonazePAM (KlonoPIN) 1 MG tablet 180 tablet 0     Sig: Take 1 tablet by mouth 2 (Two) Times a Day As Needed for Anxiety.      Last office visit with prescribing clinician: 1/9/2024   Last telemedicine visit with prescribing clinician: Visit date not found   Next office visit with prescribing clinician: 7/29/2024                         Would you like a call back once the refill request has been completed: [] Yes [] No    If the office needs to give you a call back, can they leave a voicemail: [] Yes [] No    Deena Hunt MA  06/19/24, 10:11 EDT

## 2024-06-24 ENCOUNTER — TELEPHONE (OUTPATIENT)
Dept: INTERNAL MEDICINE | Facility: CLINIC | Age: 79
End: 2024-06-24

## 2024-06-24 NOTE — TELEPHONE ENCOUNTER
"Relay     \"Left pt daughter message that she can take her to [facility], if she thinks pt needs to go to [place] they can go to [facility] since I was not sure what was going on with patient per the note\"                 "

## 2024-06-24 NOTE — TELEPHONE ENCOUNTER
Caller: Malu Aguilar    Relationship: Emergency Contact    Best call back number: [phone]     What was the call regarding: PATIENTS DAUGHTER STATES THAT HER MOTHER NEEDS TO GO TO AN EMERGENCY DEPARTMENT FOR SOME ISSUES SHE IS HAVING AND WANTS TO KNOW WHICH ONE THAT [facility] THINKS SHE SHOULD GO TO.    Is it okay if the provider responds through MyChart:

## 2024-06-27 ENCOUNTER — HOSPITAL ENCOUNTER (EMERGENCY)
Facility: HOSPITAL | Age: 79
Discharge: HOME | End: 2024-06-28
Attending: EMERGENCY MEDICINE
Payer: MEDICARE

## 2024-06-27 VITALS
WEIGHT: 150 LBS | DIASTOLIC BLOOD PRESSURE: 72 MMHG | RESPIRATION RATE: 16 BRPM | BODY MASS INDEX: 24.99 KG/M2 | HEIGHT: 65 IN | TEMPERATURE: 98.6 F | SYSTOLIC BLOOD PRESSURE: 113 MMHG | HEART RATE: 78 BPM | OXYGEN SATURATION: 93 %

## 2024-06-27 DIAGNOSIS — M54.50 ACUTE MIDLINE LOW BACK PAIN WITHOUT SCIATICA: Primary | ICD-10-CM

## 2024-06-27 PROCEDURE — 96372 THER/PROPH/DIAG INJ SC/IM: CPT

## 2024-06-27 PROCEDURE — 99283 EMERGENCY DEPT VISIT LOW MDM: CPT

## 2024-06-28 PROCEDURE — 25010000002 KETOROLAC TROMETHAMINE PER 15 MG

## 2024-06-28 PROCEDURE — 96372 THER/PROPH/DIAG INJ SC/IM: CPT

## 2024-06-28 RX ORDER — OXYCODONE HYDROCHLORIDE 5 MG/1
5 TABLET ORAL ONCE
Status: COMPLETED | OUTPATIENT
Start: 2024-06-28 | End: 2024-06-28

## 2024-06-28 RX ORDER — OXYCODONE HYDROCHLORIDE 5 MG/1
TABLET ORAL
Status: COMPLETED
Start: 2024-06-28 | End: 2024-06-28

## 2024-06-28 RX ORDER — KETOROLAC TROMETHAMINE 30 MG/ML
INJECTION, SOLUTION INTRAMUSCULAR; INTRAVENOUS
Status: COMPLETED
Start: 2024-06-28 | End: 2024-06-28

## 2024-06-28 RX ORDER — KETOROLAC TROMETHAMINE 30 MG/ML
60 INJECTION, SOLUTION INTRAMUSCULAR; INTRAVENOUS ONCE
Status: COMPLETED | OUTPATIENT
Start: 2024-06-28 | End: 2024-06-28

## 2024-06-28 RX ADMIN — OXYCODONE HYDROCHLORIDE 5 MG: 5 TABLET ORAL at 04:39

## 2024-06-28 RX ADMIN — KETOROLAC TROMETHAMINE 60 MG: 30 INJECTION, SOLUTION INTRAMUSCULAR at 04:30

## 2024-06-28 RX ADMIN — KETOROLAC TROMETHAMINE 60 MG: 30 INJECTION, SOLUTION INTRAMUSCULAR; INTRAVENOUS at 04:30

## 2024-06-28 NOTE — ED PROVIDER NOTES
EMERGENCY DEPARTMENT ENCOUNTER    Pt Name: Gabi Dudley  MRN: [id number]  Pt :   1945  Room Number:  10/10  Date of encounter:  2024  PCP: Krystina Saunders DO  ED Provider: Maximo De Oliveira MD    Historian: Patient, daughter      HPI:  Chief Complaint: Back pain        Context: Gabi Dudley is a 79 y.o. female who presents to the ED c/o back pain.  Patient has past medical history significant for L3 superior endplate fracture that was diagnosed on .  Patient says she has been having persistent low back pain.  She denies having fever.  She denies having urinary retention, urinary incontinence or fecal incontinence.  She denies having fever.  She is the pain started to radiate to her right lower extremity.      PAST MEDICAL HISTORY  Past Medical History:   Diagnosis Date    Adrenal adenoma     Anemia     Arrhythmia     Asthma     Atrial fibrillation     Back pain     Benign colonic polyp     Benign tumor of adrenal gland     Cataract     bilateral    Cholelithiasis     Chronic bronchitis     Chronic bronchitis with COPD (chronic obstructive pulmonary disease)     COPD (chronic obstructive pulmonary disease)     Coronary artery disease     Depression     Diverticulosis Years ago    Elevated cholesterol     Environmental and seasonal allergies     Fibromyalgia     Fibromyalgia, primary Sometime in the 90s    Gastritis     Generalized anxiety disorder     GERD (gastroesophageal reflux disease)     H/O mammogram     Headache Years ago    Hemorrhoids     History of blood transfusion     History of blood transfusion     History of echocardiogram     History of endometriosis     History of nuclear stress test     Hospitalization or health care facility admission within last 6 months     5 times between 2022-2023    Hypertension     IBS (irritable bowel syndrome)     Impaired functional mobility, balance, gait, and endurance     Impaired mobility     Inverted nipple     Kidney  disease     Kidney stone     Liver cyst     Low back pain Years ago    Nodular radiologic density     Nodule of left lung     On home oxygen therapy     2 liters NC QHS    Osteoarthritis     Osteopenia Several years ago    Osteoporosis     PONV (postoperative nausea and vomiting)     Renal cyst     Sinus problem     2014    Sinusitis     Skin cancer     basal cell carcinoma    SOB (shortness of breath)     Tobacco use     Urinary frequency     Urinary tract infection Years ago    Frequent UTIs    Vitamin D deficiency     Wears glasses     Wears partial dentures     upper plate         PAST SURGICAL HISTORY  Past Surgical History:   Procedure Laterality Date    APPENDECTOMY  1980s    CARDIAC CATHETERIZATION  2003    CATARACT EXTRACTION  2013    both eyes    CHOLECYSTECTOMY  2020    CHOLECYSTECTOMY WITH INTRAOPERATIVE CHOLANGIOGRAM N/A 10/30/2020    Procedure: CHOLECYSTECTOMY LAPAROSCOPIC INTRAOPERATIVE CHOLANGIOGRAPHY;  Surgeon: Sima Moses MD;  Location: [facility] OR;  Service: General;  Laterality: N/A;    COLON SURGERY  2020    COLONOSCOPY  03/11/2013    COLONOSCOPY  06/21/2016    COLONOSCOPY N/A 07/24/2019    Procedure: COLONOSCOPY W/ COLD FORCEP POLYPECTOMIES; HOT SNARE POLYPECTOMIES; COLD SNARE POLYPECTOMY;  Surgeon: Goyo Nunez MD;  Location: [facility] ENDOSCOPY;  Service: Gastroenterology    COLONOSCOPY W/ BIOPSIES AND POLYPECTOMY      EXPLORATORY LAPAROTOMY N/A 11/23/2020    Procedure: colectomy, right, closure of enterotomy x 2, reduction of internal volvulus;  Surgeon: Sima Moses MD;  Location: [facility] OR;  Service: General;  Laterality: N/A;    EXPLORATORY LAPAROTOMY N/A 8/9/2023    Procedure: LAPAROTOMY EXPLORATORY WITH LYIS OF ADHESIONS AND  CENTRAL LINE INSERTION;  Surgeon: Bianca Isaac DO;  Location: [facility] OR;  Service: General;  Laterality: N/A;    HYSTERECTOMY  1980s    partial    LYSIS OF ABDOMINAL ADHESIONS      TONSILLECTOMY  1987    UPPER GASTROINTESTINAL ENDOSCOPY  01/13/2015     VAGINAL DELIVERY      x2         FAMILY HISTORY  Family History   Problem Relation Age of Onset    Stomach cancer Brother     Colon cancer Maternal Aunt     Brain cancer Father     Arthritis Father     Hypertension Father     Cancer Father         Father, brother, and aunt    Lung cancer Paternal Grandfather     Heart disease Mother         Mother passed away due to heart disease    Breast cancer Neg Hx     Ovarian cancer Neg Hx          SOCIAL HISTORY  Social History     Socioeconomic History    Marital status:    Tobacco Use    Smoking status: Former     Current packs/day: 0.00     Average packs/day: 0.3 packs/day for 30.0 years (7.5 ttl pk-yrs)     Types: Cigarettes     Start date: 11/1/1990     Quit date: 11/1/2020     Years since quitting: 3.6     Passive exposure: Past    Smokeless tobacco: Never   Vaping Use    Vaping status: Never Used   Substance and Sexual Activity    Alcohol use: No    Drug use: No    Sexual activity: Not Currently     Birth control/protection: Post-menopausal         ALLERGIES  Morphine, Ativan [lorazepam], and Doxycycline        REVIEW OF SYSTEMS    All systems reviewed and negative except for those discussed in HPI.       PHYSICAL EXAM    I have reviewed the triage vital signs and nursing notes.    ED Triage Vitals [06/27/24 2334]   Temp Heart Rate Resp BP SpO2   98.6 °F (37 °C) 78 16 113/72 93 %      Temp src Heart Rate Source Patient Position BP Location FiO2 (%)   Oral Monitor Sitting Left arm --         General: no acute distress, well-appearing, non-toxic  Skin: normal color, warm and dry  Head: normocephalic, atraumatic  Nose: normal nasal mucosa, no visible deformity.  Mouth: moist mucous membranes.  Neck: supple.  Chest: no retractions, no visible deformity  Cardiovascular: Regular rate and rhythm.  Lungs: clear to auscultation bilaterally.  Abdomen: soft, non-tender, non-distended. No rebound tenderness, no guarding.  No peritonitis.  Extremities: no cyanosis or edema.  Palpable radial pulses bilaterally. Palpable dorsalis pedis pulses bilaterally.  Feet warm and well-perfused bilaterally.  Neuro:  alert and oriented x3, no focal neurological deficits.  5 out of 5 strength about the bilateral upper and lower extremities.  Intact patellar and Achilles reflexes bilaterally.  Intact plantar and dorsiflexion of the feet bilaterally.  Psych:  appropriate mood and behavior.        LAB RESULTS  No results found for this or any previous visit (from the past 24 hour(s)).    If labs were ordered, I independently reviewed the results and considered them in treating the patient.  See medical decision making discussion section for my interpretation of lab results.        RADIOLOGY  No Radiology Exams Resulted Within Past 24 Hours    I ordered and independently reviewed the above noted radiographic studies.  See radiologist's dictation for official interpretation.      PROCEDURES    Procedures    No orders to display       MEDICATIONS GIVEN IN ER    Medications   oxyCODONE (ROXICODONE) 5 MG immediate release tablet  - ADS Override Pull (has no administration in time range)   ketorolac (TORADOL) 60 MG/2ML injection  - ADS Override Pull (has no administration in time range)         MEDICAL DECISION MAKING, PROGRESS, and CONSULTS    All labs, if obtained, have been independently reviewed by me.  All radiology studies, if obtained, have been reviewed by me and the radiologist dictating the report.  All EKG's, if obtained, have been independently viewed and interpreted by me/my attending physician.      Discussion below represents my analysis of pertinent findings related to patient's condition, differential diagnosis, treatment plan and final disposition.                         Differential diagnosis:    Differential diagnosis includes discitis, cellulitis, epidural abscess, pain related to fracture, cauda equina syndrome, other acute emergency.    Medical Decision Making Discussion:    Vitals are  reviewed and are normal.    I doubt acute cord compression as the patient has 5 out of 5 strength about the bilateral lower extremities, intact patellar and Achilles reflexes, no urinary retention, no urinary incontinence and no fecal incontinence.    Patient given IM Toradol and p.o. oxycodone for pain control.  She is discharged home with instructions to follow-up with spine and to return to the emerged department before then for any concerning symptoms, worsening symptoms or new concerns.      Additional sources:    - External (non-ED) record review: Emergency department note from [facility] from June 24 with chief complaint of constipation.  Documented that she had had 2 recent falls send CT scan of her abdomen and back showed a compression fracture of L3.  Also documented past history of COPD and osteoporosis.    Reviewed CT lumbar spine from June 13 which per radiology shows acute superior endplate fracture of L3.    Reviewed CT scan of the abdomen pelvis from June 13 documenting no acute disease per radiology.     Reviewed CT head and cervical spine from June 24 which was interpreted as negative for acute findings per radiology.    Shared Decision Making:  After my consideration of clinical presentation and any laboratory/radiology studies obtained, I discussed the findings with the patient/patient representative who is in agreement with the treatment plan and the final disposition.   Risks and benefits of discharge and/or observation/admission were discussed.    Orders placed during this visit:  No orders of the defined types were placed in this encounter.        AS OF 03:47 EDT VITALS:    BP - 113/72  HR - 78  TEMP - 98.6 °F (37 °C) (Oral)  O2 SATS - 93%                  DIAGNOSIS  Final diagnoses:   Acute midline low back pain without sciatica         DISPOSITION  Discharge      Please note that portions of this document were completed with voice recognition software.        Maximo De Oliveira MD  06/28/24 6038

## 2024-07-10 ENCOUNTER — HOSPITAL ENCOUNTER (EMERGENCY)
Facility: HOSPITAL | Age: 79
Discharge: HOME | End: 2024-07-10
Attending: STUDENT IN AN ORGANIZED HEALTH CARE EDUCATION/TRAINING PROGRAM
Payer: MEDICARE

## 2024-07-10 VITALS
WEIGHT: 150 LBS | BODY MASS INDEX: 24.99 KG/M2 | HEIGHT: 65 IN | DIASTOLIC BLOOD PRESSURE: 94 MMHG | OXYGEN SATURATION: 95 % | HEART RATE: 91 BPM | RESPIRATION RATE: 18 BRPM | TEMPERATURE: 98 F | SYSTOLIC BLOOD PRESSURE: 127 MMHG

## 2024-07-10 DIAGNOSIS — N39.0 ACUTE UTI: ICD-10-CM

## 2024-07-10 DIAGNOSIS — S32.030A COMPRESSION FRACTURE OF L3 VERTEBRA, INITIAL ENCOUNTER: Primary | ICD-10-CM

## 2024-07-10 LAB
BACTERIA UR QL AUTO: ABNORMAL /HPF
BILIRUB UR QL STRIP: NEGATIVE
CLARITY UR: CLEAR
COLOR UR: YELLOW
GLUCOSE UR STRIP-MCNC: NEGATIVE MG/DL
HGB UR QL STRIP.AUTO: ABNORMAL
HYALINE CASTS UR QL AUTO: ABNORMAL /LPF
KETONES UR QL STRIP: ABNORMAL
LEUKOCYTE ESTERASE UR QL STRIP.AUTO: ABNORMAL
NITRITE UR QL STRIP: NEGATIVE
PH UR STRIP.AUTO: 5.5 [PH] (ref 5–8)
PROT UR QL STRIP: ABNORMAL
RBC # UR STRIP: ABNORMAL /HPF
REF LAB TEST METHOD: ABNORMAL
SP GR UR STRIP: 1.03 (ref 1–1.03)
SQUAMOUS #/AREA URNS HPF: ABNORMAL /HPF
UROBILINOGEN UR QL STRIP: ABNORMAL
WBC # UR STRIP: ABNORMAL /HPF

## 2024-07-10 PROCEDURE — 63710000001 ONDANSETRON ODT 4 MG TABLET DISPERSIBLE: Performed by: PHYSICIAN ASSISTANT

## 2024-07-10 PROCEDURE — 87086 URINE CULTURE/COLONY COUNT: CPT | Performed by: PHYSICIAN ASSISTANT

## 2024-07-10 PROCEDURE — 81001 URINALYSIS AUTO W/SCOPE: CPT | Performed by: PHYSICIAN ASSISTANT

## 2024-07-10 PROCEDURE — 99283 EMERGENCY DEPT VISIT LOW MDM: CPT

## 2024-07-10 RX ORDER — CEPHALEXIN 500 MG/1
500 CAPSULE ORAL 4 TIMES DAILY
Qty: 27 CAPSULE | Refills: 0 | Status: SHIPPED | OUTPATIENT
Start: 2024-07-10 | End: 2024-07-17

## 2024-07-10 RX ORDER — OXYCODONE HYDROCHLORIDE AND ACETAMINOPHEN 5; 325 MG/1; MG/1
1 TABLET ORAL ONCE
Status: COMPLETED | OUTPATIENT
Start: 2024-07-10 | End: 2024-07-10

## 2024-07-10 RX ORDER — CEPHALEXIN 250 MG/1
500 CAPSULE ORAL ONCE
Status: COMPLETED | OUTPATIENT
Start: 2024-07-10 | End: 2024-07-10

## 2024-07-10 RX ORDER — ONDANSETRON 4 MG/1
4 TABLET, ORALLY DISINTEGRATING ORAL EVERY 8 HOURS PRN
Qty: 12 TABLET | Refills: 0 | Status: SHIPPED | OUTPATIENT
Start: 2024-07-10

## 2024-07-10 RX ORDER — ONDANSETRON 4 MG/1
4 TABLET, ORALLY DISINTEGRATING ORAL ONCE
Status: COMPLETED | OUTPATIENT
Start: 2024-07-10 | End: 2024-07-10

## 2024-07-10 RX ADMIN — OXYCODONE HYDROCHLORIDE AND ACETAMINOPHEN 1 TABLET: 5; 325 TABLET ORAL at 17:51

## 2024-07-10 RX ADMIN — CEPHALEXIN 500 MG: 250 CAPSULE ORAL at 18:45

## 2024-07-10 RX ADMIN — ONDANSETRON 4 MG: 4 TABLET, ORALLY DISINTEGRATING ORAL at 17:52

## 2024-07-10 NOTE — ED PROVIDER NOTES
Subjective:  Chief Complaint:  Back pain    History of Present Illness:  Patient is a 79-year-old female with history of anxiety, asthma, anemia, COPD on 4 L of oxygen at all times, depression, among others presenting to the ER with complaints of low back pain radiating to lower extremities.  Patient has a known L3 compression fracture which she was diagnosed with here after a fall last month.  Patient has since followed up with orthopedics on 6- and was advised to follow-up with pain management to possibly increase Percocet and also is giving referral to a surgeon for consideration of repair of the fracture.  Patient denies any urinary incontinence, fecal incontinence, saddle anesthesia.  She is wearing a back brace.  Denies any injuries since the last injury.  Patient is anxious, difficult historian.  Gets sidetracked easily.  States that she is a ball of nerves and is very tired of being at the doctors office.  Patient does note that she sometimes has some dysuria but states she is not experiencing that now but states she would like to be checked for UTI.  Denies additional symptoms or complaints at this time.      Nurses Notes reviewed and agree, including vitals, allergies, social history and prior medical history.     REVIEW OF SYSTEMS: All systems reviewed and not pertinent unless noted.  Review of Systems   Musculoskeletal:  Positive for back pain (radiating to legs).   All other systems reviewed and are negative.      Past Medical History:   Diagnosis Date    Adrenal adenoma     Anemia     Arrhythmia     Asthma     Atrial fibrillation     Back pain     Benign colonic polyp     Benign tumor of adrenal gland     Cataract     bilateral    Cholelithiasis     Chronic bronchitis     Chronic bronchitis with COPD (chronic obstructive pulmonary disease)     COPD (chronic obstructive pulmonary disease) 2005    Coronary artery disease     Depression     Diverticulosis Years ago    Elevated cholesterol      Environmental and seasonal allergies     Fibromyalgia     Fibromyalgia, primary Sometime in the 90s    Gastritis     Generalized anxiety disorder     GERD (gastroesophageal reflux disease)     H/O mammogram     Headache Years ago    Hemorrhoids     History of blood transfusion 1985    History of blood transfusion 1985    History of echocardiogram     History of endometriosis     History of nuclear stress test     Hospitalization or health care facility admission within last 6 months     5 times between 11/2022-05/2023    Hypertension     IBS (irritable bowel syndrome)     Impaired functional mobility, balance, gait, and endurance     Impaired mobility     Inverted nipple     Kidney disease     Kidney stone     Liver cyst     Low back pain Years ago    Nodular radiologic density     Nodule of left lung     On home oxygen therapy     2 liters NC QHS    Osteoarthritis     Osteopenia Several years ago    Osteoporosis     PONV (postoperative nausea and vomiting)     Renal cyst     Sinus problem     2014    Sinusitis     Skin cancer     basal cell carcinoma    SOB (shortness of breath)     Tobacco use     Urinary frequency     Urinary tract infection Years ago    Frequent UTIs    Vitamin D deficiency     Wears glasses     Wears partial dentures     upper plate       Allergies:    Morphine, Ativan [lorazepam], and Doxycycline      Past Surgical History:   Procedure Laterality Date    APPENDECTOMY  1980s    CARDIAC CATHETERIZATION  2003    CATARACT EXTRACTION  2013    both eyes    CHOLECYSTECTOMY  2020    CHOLECYSTECTOMY WITH INTRAOPERATIVE CHOLANGIOGRAM N/A 10/30/2020    Procedure: CHOLECYSTECTOMY LAPAROSCOPIC INTRAOPERATIVE CHOLANGIOGRAPHY;  Surgeon: Sima Moses MD;  Location: [facility];  Service: General;  Laterality: N/A;    COLON SURGERY  2020    COLONOSCOPY  03/11/2013    COLONOSCOPY  06/21/2016    COLONOSCOPY N/A 07/24/2019    Procedure: COLONOSCOPY W/ COLD FORCEP POLYPECTOMIES; HOT SNARE POLYPECTOMIES; COLD  "SNARE POLYPECTOMY;  Surgeon: Goyo Nunez MD;  Location: [facility] ENDOSCOPY;  Service: Gastroenterology    COLONOSCOPY W/ BIOPSIES AND POLYPECTOMY      EXPLORATORY LAPAROTOMY N/A 11/23/2020    Procedure: colectomy, right, closure of enterotomy x 2, reduction of internal volvulus;  Surgeon: Sima Moses MD;  Location: [facility] OR;  Service: General;  Laterality: N/A;    EXPLORATORY LAPAROTOMY N/A 8/9/2023    Procedure: LAPAROTOMY EXPLORATORY WITH LYIS OF ADHESIONS AND  CENTRAL LINE INSERTION;  Surgeon: Bianca Isaac DO;  Location: [facility] OR;  Service: General;  Laterality: N/A;    HYSTERECTOMY  1980s    partial    LYSIS OF ABDOMINAL ADHESIONS      TONSILLECTOMY  1987    UPPER GASTROINTESTINAL ENDOSCOPY  01/13/2015    VAGINAL DELIVERY      x2         Social History     Socioeconomic History    Marital status:    Tobacco Use    Smoking status: Former     Current packs/day: 0.00     Average packs/day: 0.3 packs/day for 30.0 years (7.5 ttl pk-yrs)     Types: Cigarettes     Start date: 11/1/1990     Quit date: 11/1/2020     Years since quitting: 3.6     Passive exposure: Past    Smokeless tobacco: Never   Vaping Use    Vaping status: Never Used   Substance and Sexual Activity    Alcohol use: No    Drug use: No    Sexual activity: Not Currently     Birth control/protection: Post-menopausal         Family History   Problem Relation Age of Onset    Stomach cancer Brother     Colon cancer Maternal Aunt     Brain cancer Father     Arthritis Father     Hypertension Father     Cancer Father         Father, brother, and aunt    Lung cancer Paternal Grandfather     Heart disease Mother         Mother passed away due to heart disease    Breast cancer Neg Hx     Ovarian cancer Neg Hx        Objective  Physical Exam:  /94   Pulse 91   Temp 98 °F (36.7 °C)   Resp 18   Ht 165.1 cm (65\")   Wt 68 kg (150 lb)   SpO2 95%   BMI 24.96 kg/m²      Physical Exam  Vitals and nursing note reviewed.   Constitutional:  "      General: She is not in acute distress.     Appearance: She is not toxic-appearing.   HENT:      Head: Normocephalic and atraumatic.      Right Ear: External ear normal.      Left Ear: External ear normal.      Nose: Nose normal.   Eyes:      Extraocular Movements: Extraocular movements intact.      Conjunctiva/sclera: Conjunctivae normal.   Cardiovascular:      Rate and Rhythm: Normal rate.   Pulmonary:      Effort: Pulmonary effort is normal. No respiratory distress.   Abdominal:      General: There is no distension.   Musculoskeletal:         General: Normal range of motion.      Cervical back: Normal range of motion and neck supple.      Comments: Wearing back brace, pain with ROM of lower back  No significant swelling or discoloration to bilateral lower extremities   Skin:     General: Skin is warm and dry.   Neurological:      General: No focal deficit present.      Mental Status: She is alert and oriented to person, place, and time.   Psychiatric:         Mood and Affect: Mood normal.         Behavior: Behavior normal.         Procedures    ED Course:         Lab Results (last 24 hours)       Procedure Component Value Units Date/Time    Urinalysis With Microscopic If Indicated (No Culture) - Urine, Clean Catch [[id number]]  (Abnormal) Collected: 07/10/24 1745    Specimen: Urine, Clean Catch Updated: 07/10/24 1754     Color, UA Yellow     Appearance, UA Clear     pH, UA 5.5     Specific Gravity, UA 1.026     Glucose, UA Negative     Ketones, UA Trace     Bilirubin, UA Negative     Blood, UA Trace     Protein, UA Trace     Leuk Esterase, UA Moderate (2+)     Nitrite, UA Negative     Urobilinogen, UA 0.2 E.U./dL    Urinalysis, Microscopic Only - Urine, Clean Catch [[id number]]  (Abnormal) Collected: 07/10/24 1745    Specimen: Urine, Clean Catch Updated: 07/10/24 1814     RBC, UA 0-2 /HPF      WBC, UA 3-5 /HPF      Bacteria, UA Trace /HPF      Squamous Epithelial Cells, UA 0-2 /HPF      Hyaline Casts, UA 3-6  /LPF      Methodology Manual Light Microscopy    Urine Culture - Urine, Urine, Clean Catch [[id number]] Collected: 07/10/24 1745    Specimen: Urine, Clean Catch Updated: 07/10/24 1849             No radiology results from the last 24 hrs       MDM  Patient was evaluated in the ER for continued lower back pain after being diagnosed with a compression fracture last month.  She is hemodynamically stable, afebrile, nontoxic-appearing on exam.  Denies any new injuries.  Does complain of intermittent dysuria and would like to be checked for UTI.  She takes Percocet and Robaxin prescribed by pain management.  States she has been taking these medicines at home.  Last orthopedic note advised patient to follow-up with pain management possibly have pain medications increased due to acute fracture.  Patient unsure if she has follow-up with them.  States she is at the doctor a lot.  Initial plan includes urinalysis and initial treatment with Zofran and Percocet.  Patient has no red flag symptoms today and is already being referred to a spinal surgeon.    Urinalysis is concerning for UTI with 3-5 white blood cells, trace bacteria, moderate leukocytes, no squamous epithelial cells.  Urine culture was sent.  Patient started on Keflex with first dose given in the ER.  Patient's daughter was initially not at bedside but upon reevaluation of the patient and discussion about discharge, daughter is at bedside and states that she has been caring for the patient by herself over the last 3 to 4 weeks and has been unable to work.  She states patient has been to multiple providers, multiple ER visits and she does not feel like anything is getting better.  Patient states that she has been unable to perform daily activities and has had to have help even getting to the toilet.  Daughter appears overwhelmed and requesting to speak with the  regarding options.  We do not have  in the ER today.  Called , Odilia  Sonido.  She is going to look into the patient chart and call back to discuss options.    Odilia called back to say that patient has Medicare A and B and would require a 3 night hospital stay to be placed in a rehab facility.  She states that she does not currently meet admission criteria for inpatient for 3 nights.  She states it would be impossible to place the patient if she was admitted.  She states that home health and OT is an option.  She did try to call the patient's daughter who is in the ER with her twice but states there was no answer.  Wrote down Odilia's number for the patient's daughter and discussed with her what had been relayed from the .  Advised her that she could call the  before leaving the ER if she wishes to ensure there is nothing else that we can do from our standpoint.  However, patient's daughter states that they are fine to be discharged now.  Advised follow-up with PCP and orthopedic spinal surgeon.  Precautions were given for return to the ER for any new or worsening symptoms.    Final diagnoses:   Compression fracture of L3 vertebra, initial encounter   Acute UTI          Debra Diaz PA-C  07/10/24 1920

## 2024-07-10 NOTE — DISCHARGE INSTRUCTIONS
Continue pain medications as prescribed.  Take antibiotics as prescribed.  Take Zofran as prescribed as needed for nausea related to pain medications.  Follow-up with your pain management provider for further outpatient evaluation of increased pain secondary to lumbar compression fracture.  Follow-up with spinal surgeon for definitive care as discussed with orthopedic specialist that you recently seen.  Return to the ER for new or worsening symptoms or acute concerns.

## 2024-07-10 NOTE — CASE MANAGEMENT/SOCIAL WORK
Case Management/Social Work    Patient Name:  Gabi Dudley  YOB: 1945  MRN: [id number]  Admit Date:  7/10/2024      On call Sw contacted by CHRISTOPH Krishnan regarding pt. States pts daughter is requesting pt be admitted for STR placement. Pt's daughter, Farida, has been providing care for pt for several weeks and pt does not seem to be improving.  Chart review was performed by this Sw. Pt's insurance is Medicare A and B. In order for pt to be eligible for STR, she would have to meet inpt criteria and have a three night inpt stay.  There is no guarantee pt would meet this qualification, as pt would be admitted as a social admit verses med management. Also, pts complaints at this time are from a preexisting compression fracture. Sw attempted to contact pt's daughter via the number listed on the facesheet with no answer. Spoke to CHRISTOPH Krishnan again and asked for pt's daughter to be given Sw number if she had any further questions.  At this time home health could be set up if pt's daughter is agreeable. Should home health orders be placed, this Sw will arrange home health in the morning.       Electronically signed by:  SAGAR Calderon  07/10/24 19:38 EDT

## 2024-07-12 LAB — BACTERIA SPEC AEROBE CULT: NORMAL

## 2024-07-19 DIAGNOSIS — F41.9 ANXIETY: ICD-10-CM

## 2024-07-19 RX ORDER — SERTRALINE HYDROCHLORIDE 100 MG/1
TABLET, FILM COATED ORAL
Qty: 45 TABLET | Refills: 0 | Status: ON HOLD | OUTPATIENT
Start: 2024-07-19

## 2024-07-22 ENCOUNTER — APPOINTMENT (OUTPATIENT)
Dept: CT IMAGING | Facility: HOSPITAL | Age: 79
End: 2024-07-22
Payer: MEDICARE

## 2024-07-22 ENCOUNTER — HOSPITAL ENCOUNTER (INPATIENT)
Facility: HOSPITAL | Age: 79
LOS: 4 days | Discharge: NURSING, REHAB OR LONG-TERM CARE | End: 2024-07-26
Attending: EMERGENCY MEDICINE | Admitting: INTERNAL MEDICINE
Payer: MEDICARE

## 2024-07-22 DIAGNOSIS — R10.9 ABDOMINAL PAIN, UNSPECIFIED ABDOMINAL LOCATION: ICD-10-CM

## 2024-07-22 DIAGNOSIS — F41.9 ANXIETY: ICD-10-CM

## 2024-07-22 DIAGNOSIS — I21.4 NSTEMI (NON-ST ELEVATED MYOCARDIAL INFARCTION): Primary | ICD-10-CM

## 2024-07-22 DIAGNOSIS — M62.08 DIASTASIS OF RECTUS ABDOMINIS: ICD-10-CM

## 2024-07-22 DIAGNOSIS — R11.2 NAUSEA AND VOMITING, UNSPECIFIED VOMITING TYPE: ICD-10-CM

## 2024-07-22 DIAGNOSIS — M17.0 PRIMARY OSTEOARTHRITIS OF BOTH KNEES: ICD-10-CM

## 2024-07-22 DIAGNOSIS — K57.92 DIVERTICULITIS: ICD-10-CM

## 2024-07-22 PROBLEM — J44.9 COPD (CHRONIC OBSTRUCTIVE PULMONARY DISEASE): Status: ACTIVE | Noted: 2022-11-21

## 2024-07-22 PROBLEM — K56.609 BOWEL OBSTRUCTION: Status: ACTIVE | Noted: 2024-07-22

## 2024-07-22 PROBLEM — R79.89 ELEVATED TROPONIN: Status: ACTIVE | Noted: 2024-07-22

## 2024-07-22 PROBLEM — K52.9 ENTERITIS: Status: ACTIVE | Noted: 2024-07-22

## 2024-07-22 LAB
ALBUMIN SERPL-MCNC: 3.4 G/DL (ref 3.5–5.2)
ALBUMIN/GLOB SERPL: 1.3 G/DL
ALP SERPL-CCNC: 192 U/L (ref 39–117)
ALT SERPL W P-5'-P-CCNC: 18 U/L (ref 1–33)
ANION GAP SERPL CALCULATED.3IONS-SCNC: 9.6 MMOL/L (ref 5–15)
AST SERPL-CCNC: 37 U/L (ref 1–32)
BASOPHILS # BLD AUTO: 0.02 10*3/MM3 (ref 0–0.2)
BASOPHILS NFR BLD AUTO: 0.1 % (ref 0–1.5)
BILIRUB SERPL-MCNC: 0.2 MG/DL (ref 0–1.2)
BUN SERPL-MCNC: 17 MG/DL (ref 8–23)
BUN/CREAT SERPL: 26.2 (ref 7–25)
CALCIUM SPEC-SCNC: 8.7 MG/DL (ref 8.6–10.5)
CHLORIDE SERPL-SCNC: 96 MMOL/L (ref 98–107)
CO2 SERPL-SCNC: 36.4 MMOL/L (ref 22–29)
CREAT SERPL-MCNC: 0.65 MG/DL (ref 0.57–1)
D-LACTATE SERPL-SCNC: 1.2 MMOL/L (ref 0.5–2)
DEPRECATED RDW RBC AUTO: 47.8 FL (ref 37–54)
EGFRCR SERPLBLD CKD-EPI 2021: 90.8 ML/MIN/1.73
EOSINOPHIL # BLD AUTO: 0 10*3/MM3 (ref 0–0.4)
EOSINOPHIL NFR BLD AUTO: 0 % (ref 0.3–6.2)
ERYTHROCYTE [DISTWIDTH] IN BLOOD BY AUTOMATED COUNT: 15 % (ref 12.3–15.4)
GEN 5 2HR TROPONIN T REFLEX: 259 NG/L
GLOBULIN UR ELPH-MCNC: 2.7 GM/DL
GLUCOSE SERPL-MCNC: 140 MG/DL (ref 65–99)
HCT VFR BLD AUTO: 37.4 % (ref 34–46.6)
HGB BLD-MCNC: 11.4 G/DL (ref 12–15.9)
HOLD SPECIMEN: NORMAL
IMM GRANULOCYTES # BLD AUTO: 0.08 10*3/MM3 (ref 0–0.05)
IMM GRANULOCYTES NFR BLD AUTO: 0.6 % (ref 0–0.5)
LIPASE SERPL-CCNC: 10 U/L (ref 13–60)
LYMPHOCYTES # BLD AUTO: 0.64 10*3/MM3 (ref 0.7–3.1)
LYMPHOCYTES NFR BLD AUTO: 4.7 % (ref 19.6–45.3)
MCH RBC QN AUTO: 26.8 PG (ref 26.6–33)
MCHC RBC AUTO-ENTMCNC: 30.5 G/DL (ref 31.5–35.7)
MCV RBC AUTO: 87.8 FL (ref 79–97)
MONOCYTES # BLD AUTO: 0.81 10*3/MM3 (ref 0.1–0.9)
MONOCYTES NFR BLD AUTO: 5.9 % (ref 5–12)
NEUTROPHILS NFR BLD AUTO: 12.17 10*3/MM3 (ref 1.7–7)
NEUTROPHILS NFR BLD AUTO: 88.7 % (ref 42.7–76)
NRBC BLD AUTO-RTO: 0 /100 WBC (ref 0–0.2)
PLATELET # BLD AUTO: 262 10*3/MM3 (ref 140–450)
PMV BLD AUTO: 10.4 FL (ref 6–12)
POTASSIUM SERPL-SCNC: 3.5 MMOL/L (ref 3.5–5.2)
PROT SERPL-MCNC: 6.1 G/DL (ref 6–8.5)
RBC # BLD AUTO: 4.26 10*6/MM3 (ref 3.77–5.28)
SODIUM SERPL-SCNC: 142 MMOL/L (ref 136–145)
TROPONIN T DELTA: -30 NG/L
TROPONIN T SERPL HS-MCNC: 289 NG/L
WBC NRBC COR # BLD AUTO: 13.72 10*3/MM3 (ref 3.4–10.8)
WHOLE BLOOD HOLD COAG: NORMAL
WHOLE BLOOD HOLD SPECIMEN: NORMAL

## 2024-07-22 PROCEDURE — 25010000002 METRONIDAZOLE 500 MG/100ML SOLUTION: Performed by: INTERNAL MEDICINE

## 2024-07-22 PROCEDURE — 25810000003 SODIUM CHLORIDE 0.9 % SOLUTION: Performed by: PHYSICIAN ASSISTANT

## 2024-07-22 PROCEDURE — 36415 COLL VENOUS BLD VENIPUNCTURE: CPT

## 2024-07-22 PROCEDURE — 99222 1ST HOSP IP/OBS MODERATE 55: CPT | Performed by: STUDENT IN AN ORGANIZED HEALTH CARE EDUCATION/TRAINING PROGRAM

## 2024-07-22 PROCEDURE — 84484 ASSAY OF TROPONIN QUANT: CPT | Performed by: EMERGENCY MEDICINE

## 2024-07-22 PROCEDURE — 25010000002 ONDANSETRON PER 1 MG: Performed by: PHYSICIAN ASSISTANT

## 2024-07-22 PROCEDURE — 25010000002 MORPHINE PER 10 MG: Performed by: INTERNAL MEDICINE

## 2024-07-22 PROCEDURE — 25510000001 IOPAMIDOL 61 % SOLUTION: Performed by: EMERGENCY MEDICINE

## 2024-07-22 PROCEDURE — 93005 ELECTROCARDIOGRAM TRACING: CPT | Performed by: PHYSICIAN ASSISTANT

## 2024-07-22 PROCEDURE — 25010000002 VANCOMYCIN HCL 1.25 G RECONSTITUTED SOLUTION 1 EACH VIAL: Performed by: PHYSICIAN ASSISTANT

## 2024-07-22 PROCEDURE — 87040 BLOOD CULTURE FOR BACTERIA: CPT | Performed by: EMERGENCY MEDICINE

## 2024-07-22 PROCEDURE — 25010000002 PIPERACILLIN SOD-TAZOBACTAM PER 1 G: Performed by: PHYSICIAN ASSISTANT

## 2024-07-22 PROCEDURE — 83605 ASSAY OF LACTIC ACID: CPT | Performed by: PHYSICIAN ASSISTANT

## 2024-07-22 PROCEDURE — 99285 EMERGENCY DEPT VISIT HI MDM: CPT

## 2024-07-22 PROCEDURE — 93005 ELECTROCARDIOGRAM TRACING: CPT | Performed by: EMERGENCY MEDICINE

## 2024-07-22 PROCEDURE — 25010000002 MORPHINE PER 10 MG: Performed by: EMERGENCY MEDICINE

## 2024-07-22 PROCEDURE — 85025 COMPLETE CBC W/AUTO DIFF WBC: CPT | Performed by: EMERGENCY MEDICINE

## 2024-07-22 PROCEDURE — 25810000003 SODIUM CHLORIDE 0.9 % SOLUTION 250 ML FLEX CONT: Performed by: PHYSICIAN ASSISTANT

## 2024-07-22 PROCEDURE — 99223 1ST HOSP IP/OBS HIGH 75: CPT | Performed by: INTERNAL MEDICINE

## 2024-07-22 PROCEDURE — 25010000002 CEFTRIAXONE PER 250 MG: Performed by: INTERNAL MEDICINE

## 2024-07-22 PROCEDURE — 74177 CT ABD & PELVIS W/CONTRAST: CPT

## 2024-07-22 PROCEDURE — 80053 COMPREHEN METABOLIC PANEL: CPT | Performed by: EMERGENCY MEDICINE

## 2024-07-22 PROCEDURE — 94640 AIRWAY INHALATION TREATMENT: CPT

## 2024-07-22 PROCEDURE — 83690 ASSAY OF LIPASE: CPT | Performed by: EMERGENCY MEDICINE

## 2024-07-22 RX ORDER — METRONIDAZOLE 500 MG/100ML
500 INJECTION, SOLUTION INTRAVENOUS EVERY 8 HOURS
Status: DISCONTINUED | OUTPATIENT
Start: 2024-07-22 | End: 2024-07-26 | Stop reason: HOSPADM

## 2024-07-22 RX ORDER — IPRATROPIUM BROMIDE AND ALBUTEROL SULFATE 2.5; .5 MG/3ML; MG/3ML
3 SOLUTION RESPIRATORY (INHALATION)
Status: DISCONTINUED | OUTPATIENT
Start: 2024-07-22 | End: 2024-07-22

## 2024-07-22 RX ORDER — TRAZODONE HYDROCHLORIDE 50 MG/1
100 TABLET ORAL NIGHTLY PRN
Status: DISCONTINUED | OUTPATIENT
Start: 2024-07-22 | End: 2024-07-26 | Stop reason: HOSPADM

## 2024-07-22 RX ORDER — ALBUTEROL SULFATE 2.5 MG/3ML
2.5 SOLUTION RESPIRATORY (INHALATION) EVERY 6 HOURS PRN
Status: DISCONTINUED | OUTPATIENT
Start: 2024-07-22 | End: 2024-07-26 | Stop reason: HOSPADM

## 2024-07-22 RX ORDER — POLYETHYLENE GLYCOL 3350 17 G/17G
17 POWDER, FOR SOLUTION ORAL DAILY PRN
Status: DISCONTINUED | OUTPATIENT
Start: 2024-07-22 | End: 2024-07-26 | Stop reason: HOSPADM

## 2024-07-22 RX ORDER — SODIUM CHLORIDE 9 MG/ML
40 INJECTION, SOLUTION INTRAVENOUS AS NEEDED
Status: DISCONTINUED | OUTPATIENT
Start: 2024-07-22 | End: 2024-07-26 | Stop reason: HOSPADM

## 2024-07-22 RX ORDER — SODIUM CHLORIDE 0.9 % (FLUSH) 0.9 %
10 SYRINGE (ML) INJECTION EVERY 12 HOURS SCHEDULED
Status: DISCONTINUED | OUTPATIENT
Start: 2024-07-22 | End: 2024-07-26 | Stop reason: HOSPADM

## 2024-07-22 RX ORDER — BISACODYL 5 MG/1
5 TABLET, DELAYED RELEASE ORAL DAILY PRN
Status: DISCONTINUED | OUTPATIENT
Start: 2024-07-22 | End: 2024-07-26 | Stop reason: HOSPADM

## 2024-07-22 RX ORDER — ONDANSETRON 4 MG/1
4 TABLET, ORALLY DISINTEGRATING ORAL EVERY 8 HOURS PRN
Status: DISCONTINUED | OUTPATIENT
Start: 2024-07-22 | End: 2024-07-26 | Stop reason: HOSPADM

## 2024-07-22 RX ORDER — BISACODYL 10 MG
10 SUPPOSITORY, RECTAL RECTAL DAILY PRN
Status: DISCONTINUED | OUTPATIENT
Start: 2024-07-22 | End: 2024-07-26 | Stop reason: HOSPADM

## 2024-07-22 RX ORDER — FLUTICASONE PROPIONATE 50 MCG
2 SPRAY, SUSPENSION (ML) NASAL DAILY
Status: DISCONTINUED | OUTPATIENT
Start: 2024-07-22 | End: 2024-07-26 | Stop reason: HOSPADM

## 2024-07-22 RX ORDER — ATORVASTATIN CALCIUM 20 MG/1
20 TABLET, FILM COATED ORAL DAILY
Status: DISCONTINUED | OUTPATIENT
Start: 2024-07-23 | End: 2024-07-23

## 2024-07-22 RX ORDER — SODIUM CHLORIDE 0.9 % (FLUSH) 0.9 %
10 SYRINGE (ML) INJECTION AS NEEDED
Status: DISCONTINUED | OUTPATIENT
Start: 2024-07-22 | End: 2024-07-26 | Stop reason: HOSPADM

## 2024-07-22 RX ORDER — PANTOPRAZOLE SODIUM 40 MG/1
40 TABLET, DELAYED RELEASE ORAL DAILY
Status: DISCONTINUED | OUTPATIENT
Start: 2024-07-22 | End: 2024-07-26 | Stop reason: HOSPADM

## 2024-07-22 RX ORDER — CETIRIZINE HYDROCHLORIDE 10 MG/1
10 TABLET ORAL DAILY
Status: DISCONTINUED | OUTPATIENT
Start: 2024-07-23 | End: 2024-07-26 | Stop reason: HOSPADM

## 2024-07-22 RX ORDER — IPRATROPIUM BROMIDE AND ALBUTEROL SULFATE 2.5; .5 MG/3ML; MG/3ML
3 SOLUTION RESPIRATORY (INHALATION)
Status: DISCONTINUED | OUTPATIENT
Start: 2024-07-22 | End: 2024-07-26 | Stop reason: HOSPADM

## 2024-07-22 RX ORDER — FERROUS SULFATE 324(65)MG
324 TABLET, DELAYED RELEASE (ENTERIC COATED) ORAL
Status: DISCONTINUED | OUTPATIENT
Start: 2024-07-23 | End: 2024-07-26 | Stop reason: HOSPADM

## 2024-07-22 RX ORDER — DILTIAZEM HYDROCHLORIDE 180 MG/1
180 CAPSULE, COATED, EXTENDED RELEASE ORAL DAILY
Status: DISCONTINUED | OUTPATIENT
Start: 2024-07-23 | End: 2024-07-23

## 2024-07-22 RX ORDER — METHOCARBAMOL 750 MG/1
750 TABLET, FILM COATED ORAL 4 TIMES DAILY
COMMUNITY

## 2024-07-22 RX ORDER — SODIUM CHLORIDE 9 MG/ML
INJECTION, SOLUTION INTRAVENOUS
Status: DISPENSED
Start: 2024-07-22 | End: 2024-07-23

## 2024-07-22 RX ORDER — DULOXETIN HYDROCHLORIDE 30 MG/1
60 CAPSULE, DELAYED RELEASE ORAL 2 TIMES DAILY
Status: DISCONTINUED | OUTPATIENT
Start: 2024-07-22 | End: 2024-07-26 | Stop reason: HOSPADM

## 2024-07-22 RX ORDER — CLONAZEPAM 1 MG/1
1 TABLET ORAL 2 TIMES DAILY PRN
Qty: 60 TABLET | Refills: 0 | Status: SHIPPED | OUTPATIENT
Start: 2024-07-22 | End: 2024-07-26

## 2024-07-22 RX ORDER — ECHINACEA PURPUREA EXTRACT 125 MG
2 TABLET ORAL AS NEEDED
Status: DISCONTINUED | OUTPATIENT
Start: 2024-07-22 | End: 2024-07-26 | Stop reason: HOSPADM

## 2024-07-22 RX ORDER — OXYCODONE AND ACETAMINOPHEN 7.5; 325 MG/1; MG/1
1 TABLET ORAL EVERY 4 HOURS PRN
Status: DISCONTINUED | OUTPATIENT
Start: 2024-07-22 | End: 2024-07-26 | Stop reason: HOSPADM

## 2024-07-22 RX ORDER — ONDANSETRON 4 MG/1
4 TABLET, ORALLY DISINTEGRATING ORAL EVERY 6 HOURS PRN
Status: DISCONTINUED | OUTPATIENT
Start: 2024-07-22 | End: 2024-07-26 | Stop reason: HOSPADM

## 2024-07-22 RX ORDER — ONDANSETRON 2 MG/ML
4 INJECTION INTRAMUSCULAR; INTRAVENOUS ONCE
Status: COMPLETED | OUTPATIENT
Start: 2024-07-22 | End: 2024-07-22

## 2024-07-22 RX ORDER — CLONAZEPAM 0.5 MG/1
1 TABLET ORAL 2 TIMES DAILY PRN
Status: DISCONTINUED | OUTPATIENT
Start: 2024-07-22 | End: 2024-07-26 | Stop reason: HOSPADM

## 2024-07-22 RX ORDER — CLOTRIMAZOLE AND BETAMETHASONE DIPROPIONATE 10; .64 MG/G; MG/G
1 CREAM TOPICAL 2 TIMES DAILY
Status: DISCONTINUED | OUTPATIENT
Start: 2024-07-22 | End: 2024-07-26 | Stop reason: HOSPADM

## 2024-07-22 RX ORDER — MORPHINE SULFATE 2 MG/ML
2 INJECTION, SOLUTION INTRAMUSCULAR; INTRAVENOUS ONCE
Status: COMPLETED | OUTPATIENT
Start: 2024-07-22 | End: 2024-07-22

## 2024-07-22 RX ORDER — MORPHINE SULFATE 2 MG/ML
2 INJECTION, SOLUTION INTRAMUSCULAR; INTRAVENOUS
Status: DISCONTINUED | OUTPATIENT
Start: 2024-07-22 | End: 2024-07-22

## 2024-07-22 RX ORDER — ONDANSETRON 2 MG/ML
4 INJECTION INTRAMUSCULAR; INTRAVENOUS EVERY 6 HOURS PRN
Status: DISCONTINUED | OUTPATIENT
Start: 2024-07-22 | End: 2024-07-26 | Stop reason: HOSPADM

## 2024-07-22 RX ORDER — NITROGLYCERIN 0.4 MG/1
0.4 TABLET SUBLINGUAL
Status: DISCONTINUED | OUTPATIENT
Start: 2024-07-22 | End: 2024-07-26 | Stop reason: HOSPADM

## 2024-07-22 RX ORDER — CALCIUM CARBONATE 500 MG/1
2 TABLET, CHEWABLE ORAL 2 TIMES DAILY PRN
Status: DISCONTINUED | OUTPATIENT
Start: 2024-07-22 | End: 2024-07-26 | Stop reason: HOSPADM

## 2024-07-22 RX ORDER — ACETAMINOPHEN 325 MG/1
650 TABLET ORAL EVERY 4 HOURS PRN
Status: DISCONTINUED | OUTPATIENT
Start: 2024-07-22 | End: 2024-07-26 | Stop reason: HOSPADM

## 2024-07-22 RX ORDER — AMOXICILLIN 250 MG
2 CAPSULE ORAL 2 TIMES DAILY
Status: DISCONTINUED | OUTPATIENT
Start: 2024-07-22 | End: 2024-07-26 | Stop reason: HOSPADM

## 2024-07-22 RX ORDER — QUETIAPINE FUMARATE 25 MG/1
12.5 TABLET, FILM COATED ORAL NIGHTLY
Status: DISCONTINUED | OUTPATIENT
Start: 2024-07-22 | End: 2024-07-26 | Stop reason: HOSPADM

## 2024-07-22 RX ORDER — BUDESONIDE 0.5 MG/2ML
0.5 INHALANT ORAL
Status: DISCONTINUED | OUTPATIENT
Start: 2024-07-22 | End: 2024-07-26 | Stop reason: HOSPADM

## 2024-07-22 RX ORDER — AMIODARONE HYDROCHLORIDE 200 MG/1
200 TABLET ORAL
Status: DISCONTINUED | OUTPATIENT
Start: 2024-07-23 | End: 2024-07-26 | Stop reason: HOSPADM

## 2024-07-22 RX ORDER — CYCLOBENZAPRINE HCL 10 MG
5 TABLET ORAL 3 TIMES DAILY PRN
Status: DISCONTINUED | OUTPATIENT
Start: 2024-07-22 | End: 2024-07-26 | Stop reason: HOSPADM

## 2024-07-22 RX ADMIN — CLOTRIMAZOLE AND BETAMETHASONE DIPROPIONATE 1 APPLICATION: 10; .5 CREAM TOPICAL at 22:39

## 2024-07-22 RX ADMIN — QUETIAPINE FUMARATE 12.5 MG: 25 TABLET ORAL at 21:01

## 2024-07-22 RX ADMIN — SODIUM CHLORIDE 1250 MG: 0.9 INJECTION, SOLUTION INTRAVENOUS at 14:19

## 2024-07-22 RX ADMIN — BUDESONIDE 0.5 MG: 0.5 INHALANT RESPIRATORY (INHALATION) at 19:29

## 2024-07-22 RX ADMIN — IPRATROPIUM BROMIDE AND ALBUTEROL SULFATE 3 ML: .5; 3 SOLUTION RESPIRATORY (INHALATION) at 19:29

## 2024-07-22 RX ADMIN — Medication 10 ML: at 21:02

## 2024-07-22 RX ADMIN — ONDANSETRON 4 MG: 2 INJECTION INTRAMUSCULAR; INTRAVENOUS at 10:24

## 2024-07-22 RX ADMIN — DULOXETINE HYDROCHLORIDE 60 MG: 30 CAPSULE, DELAYED RELEASE ORAL at 21:01

## 2024-07-22 RX ADMIN — MORPHINE SULFATE 2 MG: 2 INJECTION, SOLUTION INTRAMUSCULAR; INTRAVENOUS at 15:36

## 2024-07-22 RX ADMIN — PANTOPRAZOLE SODIUM 40 MG: 40 TABLET, DELAYED RELEASE ORAL at 17:49

## 2024-07-22 RX ADMIN — CEFTRIAXONE SODIUM 2000 MG: 2 INJECTION, POWDER, FOR SOLUTION INTRAMUSCULAR; INTRAVENOUS at 17:49

## 2024-07-22 RX ADMIN — Medication 5000 UNITS: at 17:49

## 2024-07-22 RX ADMIN — APIXABAN 5 MG: 5 TABLET, FILM COATED ORAL at 21:01

## 2024-07-22 RX ADMIN — PIPERACILLIN SODIUM AND TAZOBACTAM SODIUM 3.38 G: 3; .375 INJECTION, POWDER, LYOPHILIZED, FOR SOLUTION INTRAVENOUS at 14:16

## 2024-07-22 RX ADMIN — Medication 5 MG: at 21:01

## 2024-07-22 RX ADMIN — METRONIDAZOLE 500 MG: 5 INJECTION, SOLUTION INTRAVENOUS at 17:50

## 2024-07-22 RX ADMIN — IOPAMIDOL 100 ML: 612 INJECTION, SOLUTION INTRAVENOUS at 12:06

## 2024-07-22 RX ADMIN — SENNOSIDES AND DOCUSATE SODIUM 2 TABLET: 50; 8.6 TABLET ORAL at 21:01

## 2024-07-22 RX ADMIN — CLONAZEPAM 1 MG: 0.5 TABLET ORAL at 17:49

## 2024-07-22 RX ADMIN — MORPHINE SULFATE 4 MG: 4 INJECTION, SOLUTION INTRAMUSCULAR; INTRAVENOUS at 11:09

## 2024-07-22 RX ADMIN — SODIUM CHLORIDE 1000 ML: 9 INJECTION, SOLUTION INTRAVENOUS at 10:27

## 2024-07-22 RX ADMIN — MORPHINE SULFATE 4 MG: 4 INJECTION, SOLUTION INTRAMUSCULAR; INTRAVENOUS at 10:24

## 2024-07-22 NOTE — ED PROVIDER NOTES
Subjective:  Chief Complaint:  Abdominal pain    History of Present Illness:  Patient is a 79-year-old female with history of multiple comorbidities including A-fib, anemia, depression, CAD, COPD on 4 L of oxygen at all times, hemorrhoids, gastritis, GERD, osteoporosis, skin cancer, hypercholesterolemia, bowel obstruction status post surgery which she states was performed here by Dr. Isaac presenting to the ER with complaints of severe abdominal pain.  It appears that patient was admitted for partial small bowel obstruction on 8-7-2023 and discharged on 8- after an ex lap, adhesiolysis and reduction of small bowel volvulus.  She states this pain feels similar to previous bowel obstructions.  Patient states she has had some nausea and dry heaving.  States her last bowel movement was yesterday.  She was recently diagnosed with a vertebral fracture as well after a fall last month.  Patient denies additional symptoms or complaints at this time.      Nurses Notes reviewed and agree, including vitals, allergies, social history and prior medical history.     REVIEW OF SYSTEMS: All systems reviewed and not pertinent unless noted.  Review of Systems   Gastrointestinal:  Positive for abdominal pain, nausea and vomiting.   All other systems reviewed and are negative.      Past Medical History:   Diagnosis Date    Adrenal adenoma     Anemia     Arrhythmia     Asthma     Atrial fibrillation     Back pain     Benign colonic polyp     Benign tumor of adrenal gland     Cataract     bilateral    Cholelithiasis     Chronic bronchitis     Chronic bronchitis with COPD (chronic obstructive pulmonary disease)     COPD (chronic obstructive pulmonary disease) 2005    Coronary artery disease     Depression     Diverticulosis Years ago    Elevated cholesterol     Environmental and seasonal allergies     Fibromyalgia     Fibromyalgia, primary Sometime in the 90s    Gastritis     Generalized anxiety disorder     GERD (gastroesophageal  reflux disease)     H/O mammogram     Headache Years ago    Hemorrhoids     History of blood transfusion 1985    History of blood transfusion 1985    History of echocardiogram     History of endometriosis     History of nuclear stress test     Hospitalization or health care facility admission within last 6 months     5 times between 11/2022-05/2023    Hypertension     IBS (irritable bowel syndrome)     Impaired functional mobility, balance, gait, and endurance     Impaired mobility     Inverted nipple     Kidney disease     Kidney stone     Liver cyst     Low back pain Years ago    Nodular radiologic density     Nodule of left lung     On home oxygen therapy     2 liters NC QHS    Osteoarthritis     Osteopenia Several years ago    Osteoporosis     PONV (postoperative nausea and vomiting)     Renal cyst     Sinus problem     2014    Sinusitis     Skin cancer     basal cell carcinoma    SOB (shortness of breath)     Tobacco use     Urinary frequency     Urinary tract infection Years ago    Frequent UTIs    Vitamin D deficiency     Wears glasses     Wears partial dentures     upper plate       Allergies:    Ativan [lorazepam] and Doxycycline      Past Surgical History:   Procedure Laterality Date    APPENDECTOMY  1980s    CARDIAC CATHETERIZATION  2003    CATARACT EXTRACTION  2013    both eyes    CHOLECYSTECTOMY  2020    CHOLECYSTECTOMY WITH INTRAOPERATIVE CHOLANGIOGRAM N/A 10/30/2020    Procedure: CHOLECYSTECTOMY LAPAROSCOPIC INTRAOPERATIVE CHOLANGIOGRAPHY;  Surgeon: Sima Moses MD;  Location: [facility] OR;  Service: General;  Laterality: N/A;    COLON SURGERY  2020    COLONOSCOPY  03/11/2013    COLONOSCOPY  06/21/2016    COLONOSCOPY N/A 07/24/2019    Procedure: COLONOSCOPY W/ COLD FORCEP POLYPECTOMIES; HOT SNARE POLYPECTOMIES; COLD SNARE POLYPECTOMY;  Surgeon: Goyo Nunez MD;  Location: [facility] ENDOSCOPY;  Service: Gastroenterology    COLONOSCOPY W/ BIOPSIES AND POLYPECTOMY      EXPLORATORY LAPAROTOMY N/A  "11/23/2020    Procedure: colectomy, right, closure of enterotomy x 2, reduction of internal volvulus;  Surgeon: Sima Moses MD;  Location: [facility] OR;  Service: General;  Laterality: N/A;    EXPLORATORY LAPAROTOMY N/A 8/9/2023    Procedure: LAPAROTOMY EXPLORATORY WITH LYIS OF ADHESIONS AND  CENTRAL LINE INSERTION;  Surgeon: Bianca Isaac DO;  Location: [facility] OR;  Service: General;  Laterality: N/A;    HYSTERECTOMY  1980s    partial    LYSIS OF ABDOMINAL ADHESIONS      TONSILLECTOMY  1987    UPPER GASTROINTESTINAL ENDOSCOPY  01/13/2015    VAGINAL DELIVERY      x2         Social History     Socioeconomic History    Marital status:    Tobacco Use    Smoking status: Former     Current packs/day: 0.00     Average packs/day: 0.3 packs/day for 30.0 years (7.5 ttl pk-yrs)     Types: Cigarettes     Start date: 11/1/1990     Quit date: 11/1/2020     Years since quitting: 3.7     Passive exposure: Past    Smokeless tobacco: Never   Vaping Use    Vaping status: Never Used   Substance and Sexual Activity    Alcohol use: No    Drug use: No    Sexual activity: Not Currently     Birth control/protection: Post-menopausal         Family History   Problem Relation Age of Onset    Stomach cancer Brother     Colon cancer Maternal Aunt     Brain cancer Father     Arthritis Father     Hypertension Father     Cancer Father         Father, brother, and aunt    Lung cancer Paternal Grandfather     Heart disease Mother         Mother passed away due to heart disease    Breast cancer Neg Hx     Ovarian cancer Neg Hx        Objective  Physical Exam:  /85   Pulse 92   Temp 98.8 °F (37.1 °C) (Oral)   Resp 20   Ht 165.1 cm (65\")   Wt 68 kg (149 lb 14.6 oz)   SpO2 97%   BMI 24.95 kg/m²      Physical Exam  Vitals and nursing note reviewed.   Constitutional:       General: She is not in acute distress.     Appearance: She is not toxic-appearing.   HENT:      Head: Normocephalic and atraumatic.   Eyes:      Extraocular " Movements: Extraocular movements intact.   Cardiovascular:      Rate and Rhythm: Normal rate.      Heart sounds: Normal heart sounds.   Pulmonary:      Effort: Pulmonary effort is normal. No respiratory distress.   Abdominal:      General: There is no distension.      Palpations: Abdomen is soft.      Tenderness: There is generalized abdominal tenderness. There is guarding.   Skin:     General: Skin is warm and dry.   Neurological:      General: No focal deficit present.      Mental Status: She is alert and oriented to person, place, and time.   Psychiatric:         Mood and Affect: Mood normal.         Behavior: Behavior normal.         Procedures    ED Course:    ED Course as of 07/22/24 1357   Mon Jul 22, 2024   1049 Spoke with Dr. Curry, Cardiologist, regarding new mild ST elevation in V3 and V4 with no reciprocal changes, troponin elevated at 289. Appears patient's baseline is in 20s. She is on Eliquis. He recommended repeat EKG and holding off on Heparin and further intervention for now. CT is pending as there is clinical concern for bowel obstruction. EKG images sent to Dr. Curry. [AP]   1107 Patient's case discussed me by ANTOINETTE, patient presenting with abdominal pain and concern for obstruction.  We order to troponin due to nausea vomiting and upper abdominal pain, patient noted to have elevated troponin possible ST changes in lead V4 and V3.  Patient's case was discussed with cardiology, patient is on Eliquis, no indication to start heparin at this time per cardiology.  Will evaluate for concerns of obstruction, and further management.  Most likely admit patient for trending troponins. [CR]   1109   EKG Interpretation  0930  Evaluated and interpreted by emergency department physician    Rhythm: Normal Sinus Rhythm  Rate: Normal  Axis: Soledad  Ectopy: pac  Conduction: Normal  ST Segments: Normal  T Waves: Normal  Q Waves: None    Clinical Impression: Normal Sinus Rhythm    Sly Mehta DO   [CR]    1114 Attempted to get CT scan with oral contrast as well as IV contrast due to concern for bowel obstruction and history of bowel obstruction but patient not tolerating oral contrast and has refused to drink it per nursing staff.  Will get CT with just IV contrast. [AP]      ED Course User Index  [AP] Debra Diaz PA-C  [CR] Sly Mehta,        Lab Results (last 24 hours)       Procedure Component Value Units Date/Time    CBC & Differential [[id number]]  (Abnormal) Collected: 07/22/24 0929    Specimen: Blood Updated: 07/22/24 0941    Narrative:      The following orders were created for panel order CBC & Differential.  Procedure                               Abnormality         Status                     ---------                               -----------         ------                     CBC Auto Differential[[id number]]        Abnormal            Final result                 Please view results for these tests on the individual orders.    Comprehensive Metabolic Panel [[id number]]  (Abnormal) Collected: 07/22/24 0929    Specimen: Blood Updated: 07/22/24 1004     Glucose 140 mg/dL      BUN 17 mg/dL      Creatinine 0.65 mg/dL      Sodium 142 mmol/L      Potassium 3.5 mmol/L      Chloride 96 mmol/L      CO2 36.4 mmol/L      Calcium 8.7 mg/dL      Total Protein 6.1 g/dL      Albumin 3.4 g/dL      ALT (SGPT) 18 U/L      AST (SGOT) 37 U/L      Alkaline Phosphatase 192 U/L      Total Bilirubin 0.2 mg/dL      Globulin 2.7 gm/dL      A/G Ratio 1.3 g/dL      BUN/Creatinine Ratio 26.2     Anion Gap 9.6 mmol/L      eGFR 90.8 mL/min/1.73     Narrative:      GFR Normal >60  Chronic Kidney Disease <60  Kidney Failure <15    The GFR formula is only valid for adults with stable renal function between ages 18 and 70.    Lipase [[id number]]  (Abnormal) Collected: 07/22/24 0929    Specimen: Blood Updated: 07/22/24 1004     Lipase 10 U/L     Single High Sensitivity Troponin T [[id number]]  (Abnormal) Collected:  07/22/24 0929    Specimen: Blood Updated: 07/22/24 1017     HS Troponin T 289 ng/L     Narrative:      High Sensitive Troponin T Reference Range:  <14.0 ng/L- Negative Female for AMI  <22.0 ng/L- Negative Male for AMI  >=14 - Abnormal Female indicating possible myocardial injury.  >=22 - Abnormal Male indicating possible myocardial injury.   Clinicians would have to utilize clinical acumen, EKG, Troponin, and serial changes to determine if it is an Acute Myocardial Infarction or myocardial injury due to an underlying chronic condition.         CBC Auto Differential [[id number]]  (Abnormal) Collected: 07/22/24 0929    Specimen: Blood Updated: 07/22/24 0941     WBC 13.72 10*3/mm3      RBC 4.26 10*6/mm3      Hemoglobin 11.4 g/dL      Hematocrit 37.4 %      MCV 87.8 fL      MCH 26.8 pg      MCHC 30.5 g/dL      RDW 15.0 %      RDW-SD 47.8 fl      MPV 10.4 fL      Platelets 262 10*3/mm3      Neutrophil % 88.7 %      Lymphocyte % 4.7 %      Monocyte % 5.9 %      Eosinophil % 0.0 %      Basophil % 0.1 %      Immature Grans % 0.6 %      Neutrophils, Absolute 12.17 10*3/mm3      Lymphocytes, Absolute 0.64 10*3/mm3      Monocytes, Absolute 0.81 10*3/mm3      Eosinophils, Absolute 0.00 10*3/mm3      Basophils, Absolute 0.02 10*3/mm3      Immature Grans, Absolute 0.08 10*3/mm3      nRBC 0.0 /100 WBC     Lactic Acid, Plasma [[id number]]  (Normal) Collected: 07/22/24 0929    Specimen: Blood Updated: 07/22/24 1040     Lactate 1.2 mmol/L     High Sensitivity Troponin T 2Hr [[id number]]  (Abnormal) Collected: 07/22/24 1124    Specimen: Blood from Arm, Right Updated: 07/22/24 1208     HS Troponin T 259 ng/L      Troponin T Delta -30 ng/L     Narrative:      High Sensitive Troponin T Reference Range:  <14.0 ng/L- Negative Female for AMI  <22.0 ng/L- Negative Male for AMI  >=14 - Abnormal Female indicating possible myocardial injury.  >=22 - Abnormal Male indicating possible myocardial injury.   Clinicians would have to utilize clinical  acumen, EKG, Troponin, and serial changes to determine if it is an Acute Myocardial Infarction or myocardial injury due to an underlying chronic condition.                  CT Abdomen Pelvis With Contrast    Result Date: 7/22/2024  PROCEDURE: CT ABDOMEN PELVIS W CONTRAST-  HISTORY:  concern for bowel obstruction, h/o multiple bowel obstructions in the past  COMPARISON: June 13, 2024.  TECHNIQUE: Multiple axial CT images were obtained from the lung bases through the pubic symphysis following the administration of Isovue 300 and oral contrast.  FINDINGS:  ABDOMEN: The lung bases are clear. The heart is normal in size. The liver shows a stable hypodense hepatic cyst in the subdiaphragmatic left lobe. There is mild intra and extrahepatic biliary ductal dilatation with cholecystectomy clips. Findings are stable. Stable bilateral well-circumscribed hypodense renal lesions are seen compatible with cysts. 1 of these shows rim calcification which is stable. Vascular calcifications are present. There is no aortic aneurysm. There is mild dilation of the pancreatic duct which is stable. There is stable appearance of the midline diastases of the abdominal wall containing colon. There is a moderate stool burden of the ascending and proximal transverse colon to the level of the diastases. There are decompressed distal bowel loops to this. Findings may represent some level of obstruction. There is wall thickening of jejunal loops in the left abdomen possibly due to enteritis. The spleen is unremarkable. No adrenal mass is present.  The pancreas is normal. The aorta is normal in caliber. There is a severe compression fracture of L3 which appeared mild on the previous exam.  PELVIS: The appendix is not identified. There is colonic diverticulosis. There is new mild wall thickening of the proximal sigmoid colon and mild infiltration of the adjacent surrounding fat consistent with diverticulitis. The urinary bladder is unremarkable.  There is a minimal amount of free fluid in the pelvis. No rim-enhancing fluid collection is identified. There is an ileocolic anastomosis in the right lower quadrant. The appendix is surgically absent.      Impression: 1. Sigmoid diverticulitis. 2. Partial mild obstruction of the transverse colon secondary to abdominal wall diastases. 3. Significant worsening of L3 compression fracture. 4. Jejunal wall thickening may be due to possible enteritis.   CTDI: 5.29 mGy DLP: 249.1 mGy.cm   This study was performed with techniques to keep radiation doses as low as reasonably achievable (ALARA). Individualized dose reduction techniques using automated exposure control or adjustment of mA and/or kV according to the patient size were employed.      Images were reviewed, interpreted, and dictated by Dr. Angeles Collins MD Transcribed by Reyna Gerard PA-C.  This report was signed and finalized on 7/22/2024 1:27 PM by Angeles Collins MD.      HEART Score: 7    MDM     Amount and/or Complexity of Data Reviewed  Decide to obtain previous medical records or to obtain history from someone other than the patient: yes    Patient evaluated in the ER for severe abdominal pain, nausea, vomiting.  She is hemodynamically stable, afebrile, nontoxic-appearing on exam.  Differential diagnosis includes but is not limited to bowel obstruction, electrolyte abnormalities, dehydration, colitis, diverticulitis, UTI, ACS, cardiac arrhythmia, among others.  Initial plan includes CBC, CMP, lipase, lactate, troponin, urinalysis, EKG, CT abdomen pelvis with contrast.    White count elevated at 13 with left shift.  Nursing staff called to inform that troponins were in the 280s.  Patient's baseline troponin is in the 20s.  Discussed with ED attending, Dr. Mehta and repeat EKG ordered. 2nd troponin ordered.  Repeat EKG still reveals some mild elevations to V3 and V4.  No reciprocal changes.  Discussed with Dr. Curry, cardiology, as reported above under ED  course.  Consult order placed.    Repeat troponin 259, decreased by 30 points.  CT shows diverticulitis, possible obstruction of transverse colon from diastases.  Spoke with Dr. Isaac who has performed surgery on the patient previously.  She has reviewed scan and does not feel this is an acute obstruction but recommended broad-spectrum antibiotics and states she will see the patient upon admission.  Consult placed for her as well.  Spoke with Dr. Urban, hospitalist, who has accepted the patient for admission for further evaluation and management.    Final diagnoses:   NSTEMI (non-ST elevated myocardial infarction)   Diverticulitis   Diastasis of rectus abdominis   Abdominal pain, unspecified abdominal location   Nausea and vomiting, unspecified vomiting type          Debra Diaz, PA-C  07/22/24 5163

## 2024-07-22 NOTE — H&P
[facility]   HISTORY AND PHYSICAL      Name:  Gabi Dudley   Age:  79 y.o.  Sex:  female  :  1945  MRN:  [id number]   Visit Number:  [id number]  Admission Date:  2024  Date Of Service:  24  Primary Care Physician:  Krystina Saunders DO    Chief Complaint:     Abdominal discomfort    History Of Presenting Illness:      79 female PMHx: Atrial fibrillation, anemia, anxiety and depression, CAD, COPD on 4L, hemorrhoids, gastritis, bowel obstruction twice surgically treated adhesiolysis reduction of volvulus,  also with history of partial bowellresection. Chronic history of constipation due to opiates. Has been doing laxatives, miralax, enemas. Last night had hard bowel movement. Has developed severe nausea and dry heaves. They were concerned her abdominal pain and nausea might be suggestive of obstruction. CT concerning for partial mild obstruction. Dr. Isaac to consult. Had some chest pain overnight but much milder as compared to her abdominal symptoms. Also elevated troponin Dr. Curry to follow.  Full code.    Pertinent findings: HS trop 289 and then 259, WBC 13.72, CT abdomen enteritis/diverticultis, mild obstruction, worsened L3 compression fx, NSR p pulmonale and mitrale no ischemic changes    ED Medications:    Medications   sodium chloride 0.9 % flush 10 mL (has no administration in time range)   sodium chloride 0.9 % bolus 1,000 mL (0 mL Intravenous Stopped 24 1257)   ondansetron (ZOFRAN) injection 4 mg (4 mg Intravenous Given 24 1024)   morphine injection 4 mg (4 mg Intravenous Given 24 1024)   morphine injection 4 mg (4 mg Intravenous Given 24 1109)   iopamidol (ISOVUE-300) 61 % injection 100 mL (100 mL Intravenous Given 24 1206)   Vancomycin HCl 1,250 mg in sodium chloride 0.9 % 250 mL VTB (1,250 mg Intravenous New Bag 24 1419)   piperacillin-tazobactam (ZOSYN) IVPB 3.375 g IVPB in 100 mL NS (VTB) (3.375 g Intravenous New Bag  7/22/24 1416)   Morphine sulfate (PF) injection 2 mg (2 mg Intravenous Given 7/22/24 1536)       Edited by: Milton Urban DO at 7/22/2024 2056     Review Of Systems:    All systems were reviewed and negative except as mentioned in history of presenting illness, assessment and plan.    Past Medical History: Patient  has a past medical history of Adrenal adenoma, Anemia, Arrhythmia, Asthma, Atrial fibrillation, Back pain, Benign colonic polyp, Benign tumor of adrenal gland, Cataract, Cholelithiasis, Chronic bronchitis, Chronic bronchitis with COPD (chronic obstructive pulmonary disease), COPD (chronic obstructive pulmonary disease) (2005), Coronary artery disease, Depression, Diverticulosis (Years ago), Elevated cholesterol, Environmental and seasonal allergies, Fibromyalgia, Fibromyalgia, primary (Sometime in the 90s), Gastritis, Generalized anxiety disorder, GERD (gastroesophageal reflux disease), H/O mammogram, Headache (Years ago), Hemorrhoids, History of blood transfusion (1985), History of blood transfusion (1985), History of echocardiogram, History of endometriosis, History of nuclear stress test, Hospitalization or health care facility admission within last 6 months, Hypertension, IBS (irritable bowel syndrome), Impaired functional mobility, balance, gait, and endurance, Impaired mobility, Inverted nipple, Kidney disease, Kidney stone, Liver cyst, Low back pain (Years ago), Nodular radiologic density, Nodule of left lung, On home oxygen therapy, Osteoarthritis, Osteopenia (Several years ago), Osteoporosis, PONV (postoperative nausea and vomiting), Renal cyst, Sinus problem, Sinusitis, Skin cancer, SOB (shortness of breath), Tobacco use, Urinary frequency, Urinary tract infection (Years ago), Vitamin D deficiency, Wears glasses, and Wears partial dentures.    Past Surgical History: Patient  has a past surgical history that includes Appendectomy (1980s); Tonsillectomy (1987); Colonoscopy w/ biopsies and  polypectomy; Colonoscopy (03/11/2013); Colonoscopy (06/21/2016); Upper gastrointestinal endoscopy (01/13/2015); Vaginal delivery; Colonoscopy (N/A, 07/24/2019); Cataract extraction (2013); Hysterectomy (1980s); Cardiac catheterization (2003); Abdominal adhesion surgery; cholecystectomy with intraoperative cholangiogram (N/A, 10/30/2020); Exploratory Laparotomy (N/A, 11/23/2020); Cholecystectomy (2020); Colon surgery (2020); and Exploratory Laparotomy (N/A, 8/9/2023).    Social History: Patient  reports that she quit smoking about 3 years ago. Her smoking use included cigarettes. She started smoking about 33 years ago. She has a 7.5 pack-year smoking history. She has been exposed to tobacco smoke. She has never used smokeless tobacco. She reports that she does not drink alcohol and does not use drugs.    Family History:  Patient's family history has been reviewed and found to be noncontributory.     Allergies:      Ativan [lorazepam] and Doxycycline    Home Medications:    Prior to Admission Medications       Prescriptions Last Dose Informant Patient Reported? Taking?    albuterol sulfate  (90 Base) MCG/ACT inhaler   No No    Inhale 2 puffs Every 4 (Four) Hours As Needed for Wheezing.    amiodarone (PACERONE) 200 MG tablet   No No    Take 1 tablet by mouth Daily.    apixaban (Eliquis) 5 MG tablet tablet   No No    Take 1 tablet by mouth Every 12 (Twelve) Hours.    atorvastatin (LIPITOR) 20 MG tablet   No No    Take 1 tablet by mouth Daily.    Budeson-Glycopyrrol-Formoterol (Breztri Aerosphere) 160-9-4.8 MCG/ACT aerosol inhaler   No No    Inhale 2 puffs 2 (Two) Times a Day. Rinse mouth out after use    cetirizine (zyrTEC) 10 MG tablet   No No    TAKE 1 TABLET BY MOUTH DAILY.    Cholecalciferol (vitamin D3) 125 MCG (5000 UT) capsule capsule  Self Yes No    Take 1 capsule by mouth Daily.    clonazePAM (KlonoPIN) 1 MG tablet   No No    TAKE 1 TABLET BY MOUTH 2 (TWO) TIMES A DAY AS NEEDED FOR ANXIETY.     clotrimazole-betamethasone (Lotrisone) 1-0.05 % cream   No No    Apply 1 Application topically to the appropriate area as directed 2 (Two) Times a Day.    cyclobenzaprine (FLEXERIL) 5 MG tablet   No No    Take 1 tablet by mouth 3 (Three) Times a Day As Needed for Muscle Spasms.    Diclofenac Sodium (VOLTAREN) 1 % gel gel   No No    Apply 4 g topically to the appropriate area as directed 4 (Four) Times a Day As Needed (pain).    dilTIAZem CD (CARDIZEM CD) 180 MG 24 hr capsule   No No    Take 1 capsule by mouth Daily.    DULoxetine (CYMBALTA) 60 MG capsule   No No    TAKE 1 CAPSULE BY MOUTH 2 (TWO) TIMES A DAY.    ferrous sulfate 324 (65 Fe) MG tablet delayed-release EC tablet   No No    Take 1 tablet by mouth Daily With Breakfast.    fluconazole (Diflucan) 100 MG tablet   No No    Take 1 tablet by mouth Daily.    Patient not taking:  Reported on 6/5/2024    fluticasone (FLONASE) 50 MCG/ACT nasal spray  Self, Child Yes No    2 sprays into the nostril(s) as directed by provider Daily.    furosemide (Lasix) 40 MG tablet   No No    Take 1 tablet by mouth Daily for 3 days.    ipratropium-albuterol (DUO-NEB) 0.5-2.5 mg/3 ml nebulizer   No No    USE 3 mL VIA NEBULIZER EVERY 4 HOURS AS NEEDED FOR WHEEZING    methocarbamol (ROBAXIN) 750 MG tablet   Yes No    Take 1 tablet by mouth 4 (Four) Times a Day.    O2 (OXYGEN)   Yes No    Inhale 2 L/min As Needed.    ondansetron ODT (ZOFRAN-ODT) 4 MG disintegrating tablet   No No    Place 1 tablet on the tongue Every 8 (Eight) Hours As Needed for Vomiting or Nausea.    pantoprazole (PROTONIX) 40 MG EC tablet   No No    TAKE 1 TABLET BY MOUTH DAILY    potassium chloride 10 MEQ CR tablet   Yes No    1 tablet.    predniSONE (DELTASONE) 20 MG tablet   No No    Take 1 tablet by mouth 2 (Two) Times a Day.    QUEtiapine (SEROquel) 25 MG tablet   No No    Take 0.5 tablets by mouth Every Night.    sertraline (ZOLOFT) 100 MG tablet   No No    TAKE 1 & 1/2 TABLETS BY MOUTH DAILY    sodium  "chloride 0.65 % nasal spray   No No    2 sprays into the nostril(s) as directed by provider As Needed (dry nose).    traZODone (DESYREL) 100 MG tablet   No No    TAKE 1 TABLET EVERY NIGHT AT BEDTIME BY MOUTH AS NEEDED              Vital Signs:  Temp:  [98.4 °F (36.9 °C)-98.8 °F (37.1 °C)] 98.4 °F (36.9 °C)  Heart Rate:  [88-96] 92  Resp:  [18-20] 18  BP: (103-150)/(77-99) 138/92        07/22/24  0941   Weight: 68 kg (149 lb 14.6 oz)     Body mass index is 24.95 kg/m².    Physical Exam:     Most recent vital Signs: /92 (BP Location: Right arm, Patient Position: Lying)   Pulse 92   Temp 98.4 °F (36.9 °C) (Oral)   Resp 18   Ht 165.1 cm (65\")   Wt 68 kg (149 lb 14.6 oz)   SpO2 98%   BMI 24.95 kg/m²     Constitutional: Awake, alert  Eyes: PERRLA, sclerae anicteric, no conjunctival injection  HENT: NCAT, mucous membranes moist  Neck: Supple, no thyromegaly, no lymphadenopathy, trachea midline  Respiratory:diminished bilaterally, nonlabored respirations   Cardiovascular: RRR, no murmurs, rubs, or gallops, palpable pedal pulses bilaterally  Gastrointestinal: Positive bowel sounds, soft, diffusely tender and mildly distended, no peritonitis  Musculoskeletal: No bilateral ankle edema, no clubbing or cyanosis to extremities  Psychiatric: Appropriate affect, cooperative  Neurologic: Oriented x 3, speech clear  Skin: scattered ecchymosis  Edited by: Milton Urban DO at 7/22/2024 1574      Laboratory data:    I have reviewed the labs done in the emergency room.    Results from last 7 days   Lab Units 07/22/24  0929   SODIUM mmol/L 142   POTASSIUM mmol/L 3.5   CHLORIDE mmol/L 96*   CO2 mmol/L 36.4*   BUN mg/dL 17   CREATININE mg/dL 0.65   CALCIUM mg/dL 8.7   BILIRUBIN mg/dL 0.2   ALK PHOS U/L 192*   ALT (SGPT) U/L 18   AST (SGOT) U/L 37*   GLUCOSE mg/dL 140*     Results from last 7 days   Lab Units 07/22/24  0929   WBC 10*3/mm3 13.72*   HEMOGLOBIN g/dL 11.4*   HEMATOCRIT % 37.4   PLATELETS 10*3/mm3 262       " "  Results from last 7 days   Lab Units 07/22/24  1124 07/22/24  0929   HSTROP T ng/L 259* 289*             Results from last 7 days   Lab Units 07/22/24  0929   LIPASE U/L 10*               Invalid input(s): \"USDES\", \"NITRITITE\", \"BACT\", \"EP\"    Pain Management Panel          Latest Ref Rng & Units 8/11/2023   Pain Management Panel   Creatinine, Urine mg/dL 105.2        EKG:      NSR no ischemia biatrial abnormality    Radiology:    CT Abdomen Pelvis With Contrast    Result Date: 7/22/2024  PROCEDURE: CT ABDOMEN PELVIS W CONTRAST-  HISTORY:  concern for bowel obstruction, h/o multiple bowel obstructions in the past  COMPARISON: June 13, 2024.  TECHNIQUE: Multiple axial CT images were obtained from the lung bases through the pubic symphysis following the administration of Isovue 300 and oral contrast.  FINDINGS:  ABDOMEN: The lung bases are clear. The heart is normal in size. The liver shows a stable hypodense hepatic cyst in the subdiaphragmatic left lobe. There is mild intra and extrahepatic biliary ductal dilatation with cholecystectomy clips. Findings are stable. Stable bilateral well-circumscribed hypodense renal lesions are seen compatible with cysts. 1 of these shows rim calcification which is stable. Vascular calcifications are present. There is no aortic aneurysm. There is mild dilation of the pancreatic duct which is stable. There is stable appearance of the midline diastases of the abdominal wall containing colon. There is a moderate stool burden of the ascending and proximal transverse colon to the level of the diastases. There are decompressed distal bowel loops to this. Findings may represent some level of obstruction. There is wall thickening of jejunal loops in the left abdomen possibly due to enteritis. The spleen is unremarkable. No adrenal mass is present.  The pancreas is normal. The aorta is normal in caliber. There is a severe compression fracture of L3 which appeared mild on the previous exam.  " PELVIS: The appendix is not identified. There is colonic diverticulosis. There is new mild wall thickening of the proximal sigmoid colon and mild infiltration of the adjacent surrounding fat consistent with diverticulitis. The urinary bladder is unremarkable. There is a minimal amount of free fluid in the pelvis. No rim-enhancing fluid collection is identified. There is an ileocolic anastomosis in the right lower quadrant. The appendix is surgically absent.      1. Sigmoid diverticulitis. 2. Partial mild obstruction of the transverse colon secondary to abdominal wall diastases. 3. Significant worsening of L3 compression fracture. 4. Jejunal wall thickening may be due to possible enteritis.   CTDI: 5.29 mGy DLP: 249.1 mGy.cm   This study was performed with techniques to keep radiation doses as low as reasonably achievable (ALARA). Individualized dose reduction techniques using automated exposure control or adjustment of mA and/or kV according to the patient size were employed.      Images were reviewed, interpreted, and dictated by Dr. Angeles Collins MD Transcribed by Reyna Gerard PA-C.  This report was signed and finalized on 7/22/2024 1:27 PM by Angeles Collins MD.       Assessment/Plan:      Bowel obstruction    COPD (chronic obstructive pulmonary disease)    Coronary artery disease involving native coronary artery of native heart without angina pectoris    Impaired mobility and ADLs    Enteritis    Diverticulitis    Elevated troponin  -- suspect abdominal discomfort due to acute inflammation in the bowels and diastasis. Will continue to follow with general surgery. Continue antibiotics. Serial abdominal exams. Suspect troponin elevation due to GI distress. Is scheduled for epidural on Thursday with Dr. Kapadia so if she's still here please let him know.  -- Active Treatments: Rocephin Flagyl, bowel regimen, IV fluids, laxatives,   -- Pending Results: am labs, Cardiology recommendations, PT/OT        DVT Prophylaxis:  Eliquis  Code Status: Full   Diet:  NPO sips chips  Discharge Plan: anticipate 2 to 3 days admission and then home with home health        Edited by: Milton Urban DO at 7/22/2024 1623           Risk Assessment: high  DVT Prophylaxis: eliquis  Code Status:   Code Status and Medical Interventions:   Ordered at: 07/22/24 1625     Code Status (Patient has no pulse and is not breathing):    CPR (Attempt to Resuscitate)     Medical Interventions (Patient has pulse or is breathing):    Full Support      Diet:   Dietary Orders (From admission, onward)       Start     Ordered    07/22/24 1622  NPO Diet NPO Type: Strict NPO, Sips with Meds, Ice Chips  Diet Effective Now        Question Answer Comment   NPO Type Strict NPO    NPO Type Sips with Meds    NPO Type Ice Chips        07/22/24 1625                     Advance Care Planning   ACP discussion was held with the patient during this visit. Patient does not have an advance directive, declines further assistance.           Milton Urban DO  07/22/24  16:27 EDT    Dictated utilizing Dragon dictation.

## 2024-07-22 NOTE — Clinical Note
Level of Care: Telemetry [5]   Diagnosis: Bowel obstruction [998023]   Admitting Physician: KEI DAVENPORT [[id number]]   Certification: I Certify That Inpatient Hospital Services Are Medically Necessary For Greater Than 2 Midnights

## 2024-07-22 NOTE — CASE MANAGEMENT/SOCIAL WORK
Discharge Planning Assessment  [facility]     Patient Name: Gabi Dudley  MRN: [id number]  Today's Date: 7/22/2024    Admit Date: 7/22/2024    Plan: The patient is awake and able to answer questions.  Her daughter, Malu, is at bedside and she consents for her to be involved in her DC planning.  She is a current patient of Dr. Saunders and gets her medications from [facility].  She elects to enroll in Meds to Bed.  She has the following DME:  rollator, wheelchair, oxygen (Aerocare), nebulizer, and bedside commode.  She denies the need for additional DME.  She would like to have home health services for PT, OT, and Nursing.  She has voiced that she would not like to have [facility].  At the time of DC the patient plans to return to the home she shares with her daughter and new home health services.  Questions and concerns were addressed, IMM and home health care agencies delivered at the time of this conversation.  Will provide additional resources and information upon request.   Discharge Needs Assessment       Row Name 07/22/24 1417       Living Environment    People in Home child(ashely), adult    Name(s) of People in Home Denise Stanley    Current Living Arrangements home    Duration at Residence 13 years    Potentially Unsafe Housing Conditions none    In the past 12 months has the electric, gas, oil, or water company threatened to shut off services in your home? No    Primary Care Provided by self;child(ashely)    Provides Primary Care For no one, unable/limited ability to care for self    Family Caregiver if Needed none    Quality of Family Relationships helpful;involved;supportive    Able to Return to Prior Arrangements yes       Resource/Environmental Concerns    Resource/Environmental Concerns none    Transportation Concerns none       Transportation Needs    In the past 12 months, has lack of transportation kept you from medical appointments or from getting medications? no    In the past 12  months, has lack of transportation kept you from meetings, work, or from getting things needed for daily living? No       Food Insecurity    Within the past 12 months, you worried that your food would run out before you got the money to buy more. Never true    Within the past 12 months, the food you bought just didn't last and you didn't have money to get more. Never true       Transition Planning    Patient/Family Anticipates Transition to home with help/services    Patient/Family Anticipated Services at Transition none    Transportation Anticipated family or friend will provide       Discharge Needs Assessment    Readmission Within the Last 30 Days no previous admission in last 30 days    Equipment Currently Used at Home rollator;wheelchair;nebulizer;oxygen;commode    Concerns to be Addressed discharge planning    Anticipated Changes Related to Illness inability to care for self    Equipment Needed After Discharge none    Outpatient/Agency/Support Group Needs homecare agency    Discharge Facility/Level of Care Needs home with home health    Provided Post Acute Provider List? Yes    Post Acute Provider List Home Health    Provided Post Acute Provider Quality & Resource List? Yes    Post Acute Provider Quality and Resource List Home Health    Delivered To Patient    Method of Delivery In person    Patient's Choice of Community Agency(s) Any agency except [facility]                   Discharge Plan       Row Name 07/22/24 1420       Plan    Plan The patient is awake and able to answer questions.  Her daughter, Malu, is at bedside and she consents for her to be involved in her DC planning.  She is a current patient of Dr. Saunders and gets her medications from [facility].  She elects to enroll in Meds to Bed.  She has the following DME:  rollator, wheelchair, oxygen ([employer]erocare), nebulizer, and bedside commode.  She denies the need for additional DME.  She would like to have home health services for PT, OT, and  Nursing.  She has voiced that she would not like to have [facility].  At the time of DC the patient plans to return to the home she shares with her daughter and new home health services.  Questions and concerns were addressed, [facility] and home health care agencies delivered at the time of this conversation.  Will provide additional resources and information upon request.    Patient/Family in Agreement with Plan yes    Provided Post Acute Provider List? Yes    Post Acute Provider List Nursing Home    Provided Post Acute Provider Quality & Resource List? Yes    Post Acute Provider Quality and Resource List Home Health    Delivered To Patient    Method of Delivery In person    Plan Comments Will need  referral for PT/OT/Nursing (Not [facility])    Final Discharge Disposition Code 06 - home with home health care    Final Note Plans to DC home with daughter and home health                  Continued Care and Services - Admitted Since 7/22/2024    No active coordination exists for this encounter.          Demographic Summary       Row Name 07/22/24 1415       General Information    Admission Type inpatient    Arrived From emergency department    Required Notices Provided Important Message from Medicare    Referral Source admission list    Reason for Consult discharge planning    Preferred Language English       Contact Information    Permission Granted to Share Info With                    Functional Status       Row Name 07/22/24 1415       Functional Status    Usual Activity Tolerance good    Current Activity Tolerance poor       Physical Activity    On average, how many days per week do you engage in moderate to strenuous exercise (like a brisk walk)? 0 days    On average, how many minutes do you engage in exercise at this level? 0 min    Number of minutes of exercise per week 0       Assessment of Health Literacy    How often do you have someone help you read hospital materials? Occasionally    How often  do you have problems learning about your medical condition because of difficulty understanding written information? Occasionally    How often do you have a problem understanding what is told to you about your medical condition? Occasionally    How confident are you filling out medical forms by yourself? Quite a bit    Health Literacy Good       Functional Status, IADL    Medications assistive person    Meal Preparation assistive person    Housekeeping assistive person    Laundry assistive person    Shopping assistive person       Mental Status    General Appearance WDL WDL       Mental Status Summary    Recent Changes in Mental Status/Cognitive Functioning no changes                   Psychosocial       Row Name 07/22/24 1416       Values/Beliefs    Spiritual, Cultural Beliefs, Buddhism Practices, Values that Affect Care no       Behavior WDL    Behavior WDL WDL       Emotion Mood WDL    Emotion/Mood/Affect WDL WDL       Speech WDL    Speech WDL WDL       Perceptual State WDL    Perceptual State WDL WDL       Thought Process WDL    Thought Process WDL WDL       Intellectual Performance WDL    Intellectual Performance WDL WDL                   Abuse/Neglect       Row Name 07/22/24 1416       Personal Safety    Feels Unsafe at Home or Work/School no    Feels Threatened by Someone no    Does Anyone Try to Keep You From Having Contact with Others or Doing Things Outside Your Home? no    Physical Signs of Abuse Present no                   Legal       Row Name 07/22/24 1416       Financial Resource Strain    How hard is it for you to pay for the very basics like food, housing, medical care, and heating? Not hard                   Substance Abuse       Row Name 07/22/24 1416       Substance Use    Substance Use Status never used                   Patient Forms       Row Name 07/22/24 1427       Patient Forms    Important Message from Medicare (IMM) Delivered    Delivered to Support person  Malumuriel Aguilar, Daughter     Method of delivery In person                      Zara Huang RN

## 2024-07-22 NOTE — CONSULTS
Referring Provider: No ref. provider found    Reason for Consultation: Abdominal pain, diverticulitis, partial obstruction    Patient Care Team:  Krystina Saunders DO as PCP - General (Family Medicine)  Sima Moses MD as Consulting Physician (General Surgery)  Taiwo Curry MD as Consulting Physician (Cardiology)    Chief complaint   Chief Complaint   Patient presents with    Abdominal Pain     EMS reports pt c/o lower abd cramping and constipation worsening since last week. Pt has hx of recent fracture being treated with pain meds. Pt has hx bowel obstruction. Denies any N/V       SUBJECTIVE:    History of present illness:  Patient is a 79 year old female with PMH Afib on Eliquis, anemia, CAD, COPD on 4L, bowel obstruction, status post exploratory laparotomy with adhesiolysis, bowel resection, and reduction of volvulus 8/9/23 who presented to ED today with complaint of abdominal pain, nausea, vomiting, and diarrhea. Patient states she has had abdominal pain for the past 2 weeks that has been progressively getting worse. Patient's daughter is also at beside helping to provide history. Patient has had an issue with constipation over the past few weeks and has been requiring laxatives. Her last bowel movement was yesterday and was normal sized, but hard. Patient has continued to pass flatus. She states that her pain is all over her abdomen, but mostly in the left lower quadrant. Patient and daughter also state that she has not been eating or drinking well at home because it makes her sick to her stomach. Patient's last colonoscopy was over 10 years ago and was reportedly normal. She does have a history of diverticulitis/diverticulosis, last episode was over 40 years ago.     In the ED, her vital signs were within normal limits. Laboratory studies completed and significant for elevated troponin of 289 and a leukocytosis of 13.7. UA positive for UTI. Lactic acid normal. CT abdomen pelvis was completed  and demonstrated sigmoid diverticulitis and a partial mild obstruction of the transverse colon secondary to abdominal wall diastasis. Additionally jejunal wall thickening may be due to possible enteritis.     Review of Systems:    Review of Systems - General ROS: negative for - chills, fatigue, fever, hot flashes, malaise or night sweats  Psychological ROS: negative for - Depression or anxiety  HEENT ROS: negative for -  No nasal/oral pharynx drainage or pain. No acute visual complaints.  Respiratory ROS: negative for - Shortness of breath, cough or hemoptysis.  Cardiovascular ROS: negative for - Chest pain or palpitations. No edema  Gastrointestinal ROS: positive for abdominal pain, nausea, vomiting, diarrhea  Genito-Urinary ROS: negative for - dysuria or hematuria  Musculoskeletal ROS: negative for - gait disturbance or muscle pain  Neurological ROS: negative for - dizziness, gait disturbance, memory loss, numbness/tingling or seizures  Skin: no rash    History  Past Medical History:   Diagnosis Date    Adrenal adenoma     Anemia     Arrhythmia     Asthma     Atrial fibrillation     Back pain     Benign colonic polyp     Benign tumor of adrenal gland     Cataract     bilateral    Cholelithiasis     Chronic bronchitis     Chronic bronchitis with COPD (chronic obstructive pulmonary disease)     COPD (chronic obstructive pulmonary disease) 2005    Coronary artery disease     Depression     Diverticulosis Years ago    Elevated cholesterol     Environmental and seasonal allergies     Fibromyalgia     Fibromyalgia, primary Sometime in the 90s    Gastritis     Generalized anxiety disorder     GERD (gastroesophageal reflux disease)     H/O mammogram     Headache Years ago    Hemorrhoids     History of blood transfusion 1985    History of blood transfusion 1985    History of echocardiogram     History of endometriosis     History of nuclear stress test     Hospitalization or health care facility admission within last 6  months     5 times between 11/2022-05/2023    Hypertension     IBS (irritable bowel syndrome)     Impaired functional mobility, balance, gait, and endurance     Impaired mobility     Inverted nipple     Kidney disease     Kidney stone     Liver cyst     Low back pain Years ago    Nodular radiologic density     Nodule of left lung     On home oxygen therapy     2 liters NC QHS    Osteoarthritis     Osteopenia Several years ago    Osteoporosis     PONV (postoperative nausea and vomiting)     Renal cyst     Sinus problem     2014    Sinusitis     Skin cancer     basal cell carcinoma    SOB (shortness of breath)     Tobacco use     Urinary frequency     Urinary tract infection Years ago    Frequent UTIs    Vitamin D deficiency     Wears glasses     Wears partial dentures     upper plate       Past Surgical History:   Procedure Laterality Date    APPENDECTOMY  1980s    CARDIAC CATHETERIZATION  2003    CATARACT EXTRACTION  2013    both eyes    CHOLECYSTECTOMY  2020    CHOLECYSTECTOMY WITH INTRAOPERATIVE CHOLANGIOGRAM N/A 10/30/2020    Procedure: CHOLECYSTECTOMY LAPAROSCOPIC INTRAOPERATIVE CHOLANGIOGRAPHY;  Surgeon: Sima Moses MD;  [facility];  Service: General;  Laterality: N/A;    COLON SURGERY  2020    COLONOSCOPY  03/11/2013    COLONOSCOPY  06/21/2016    COLONOSCOPY N/A 07/24/2019    Procedure: COLONOSCOPY W/ COLD FORCEP POLYPECTOMIES; HOT SNARE POLYPECTOMIES; COLD SNARE POLYPECTOMY;  Surgeon: Goyo Nunez MD;  [facility];  Service: Gastroenterology    COLONOSCOPY W/ BIOPSIES AND POLYPECTOMY      EXPLORATORY LAPAROTOMY N/A 11/23/2020    Procedure: colectomy, right, closure of enterotomy x 2, reduction of internal volvulus;  Surgeon: Sima Moses MD;  [facility];  Service: General;  Laterality: N/A;    EXPLORATORY LAPAROTOMY N/A 8/9/2023    Procedure: LAPAROTOMY EXPLORATORY WITH LYIS OF ADHESIONS AND  CENTRAL LINE INSERTION;  Surgeon: Bianca Isaac DO;  Location:   [facility];  Service: General;  Laterality: N/A;    HYSTERECTOMY  1980s    partial    LYSIS OF ABDOMINAL ADHESIONS      TONSILLECTOMY  1987    UPPER GASTROINTESTINAL ENDOSCOPY  01/13/2015    VAGINAL DELIVERY      x2       Family History   Problem Relation Age of Onset    Stomach cancer Brother     Colon cancer Maternal Aunt     Brain cancer Father     Arthritis Father     Hypertension Father     Cancer Father         Father, brother, and aunt    Lung cancer Paternal Grandfather     Heart disease Mother         Mother passed away due to heart disease    Breast cancer Neg Hx     Ovarian cancer Neg Hx        Social History     Socioeconomic History    Marital status:    Tobacco Use    Smoking status: Former     Current packs/day: 0.00     Average packs/day: 0.3 packs/day for 30.0 years (7.5 ttl pk-yrs)     Types: Cigarettes     Start date: 11/1/1990     Quit date: 11/1/2020     Years since quitting: 3.7     Passive exposure: Past    Smokeless tobacco: Never   Vaping Use    Vaping status: Never Used   Substance and Sexual Activity    Alcohol use: No    Drug use: No    Sexual activity: Not Currently     Birth control/protection: Post-menopausal       Allergies   Allergen Reactions    Ativan [Lorazepam] Mental Status Change    Doxycycline GI Intolerance       OBJECTIVE:    Medications  Current Facility-Administered Medications   Medication Dose Route Frequency Provider Last Rate Last Admin    acetaminophen (TYLENOL) tablet 650 mg  650 mg Oral Q4H PRN Milton Urban DO        albuterol (PROVENTIL) nebulizer solution 0.083% 2.5 mg/3mL  2.5 mg Nebulization Q6H PRN Milton Urban DO        amiodarone (PACERONE) tablet 200 mg  200 mg Oral Q24H Milton Urban DO        apixaban (ELIQUIS) tablet 5 mg  5 mg Oral Q12H Milton Urban DO        atorvastatin (LIPITOR) tablet 20 mg  20 mg Oral Daily Milton Urban DO        sennosides-docusate (PERICOLACE) 8.6-50 MG per tablet 2 tablet  2 tablet Oral  BID Milton Urban,         And    polyethylene glycol (MIRALAX) packet 17 g  17 g Oral Daily PRN Milton Urban DO        And    bisacodyl (DULCOLAX) EC tablet 5 mg  5 mg Oral Daily PRN Milton Urban DO        And    bisacodyl (DULCOLAX) suppository 10 mg  10 mg Rectal Daily PRN Milton Urban DO        budesonide (PULMICORT) nebulizer solution 0.5 mg  0.5 mg Nebulization BID - RT Milton Urban DO        calcium carbonate (TUMS) chewable tablet 500 mg (200 mg elemental)  2 tablet Oral BID PRN Milton Urban DO        cefTRIAXone (ROCEPHIN) 2,000 mg in sodium chloride 0.9 % 100 mL IVPB-VTB  2,000 mg Intravenous Q24H Milton Urban DO        cetirizine (zyrTEC) tablet 10 mg  10 mg Oral Daily Milton Urban DO        clonazePAM (KlonoPIN) tablet 1 mg  1 mg Oral BID PRN Milton Urban DO        clotrimazole-betamethasone (LOTRISONE) 1-0.05 % cream 1 Application  1 application  Topical BID Milton Urban DO        cyclobenzaprine (FLEXERIL) tablet 5 mg  5 mg Oral TID PRN Milton Urban DO        Diclofenac Sodium (VOLTAREN) 1 % gel 4 g  4 g Topical 4x Daily PRN Milton Urban DO        dilTIAZem CD (CARDIZEM CD) 24 hr capsule 180 mg  180 mg Oral Daily Milton Urban DO        DULoxetine (CYMBALTA) DR capsule 60 mg  60 mg Oral BID Milton Urban DO        [START ON 7/23/2024] ferrous sulfate EC tablet 324 mg  324 mg Oral Daily With Breakfast Milton Urban DO        fluticasone (FLONASE) 50 MCG/ACT nasal spray 2 spray  2 spray Nasal Daily Milton Urban DO        ipratropium-albuterol (DUO-NEB) nebulizer solution 3 mL  3 mL Nebulization 4x Daily - RT Milton Urban DO        melatonin tablet 5 mg  5 mg Oral Nightly Milton Urban DO        metroNIDAZOLE (FLAGYL) IVPB 500 mg  500 mg Intravenous Q8H Milton Urban,         Morphine sulfate (PF) injection 2 mg  2 mg Intravenous Q2H PRN Milton Urban,  DO        nitroglycerin (NITROSTAT) SL tablet 0.4 mg  0.4 mg Sublingual Q5 Min PRN Milton Urban, DO        ondansetron ODT (ZOFRAN-ODT) disintegrating tablet 4 mg  4 mg Oral Q6H PRN Milton Urban, DO        Or    ondansetron (ZOFRAN) injection 4 mg  4 mg Intravenous Q6H PRN Milton Urban, DO        ondansetron ODT (ZOFRAN-ODT) disintegrating tablet 4 mg  4 mg Translingual Q8H PRN Milton Urban, DO        pantoprazole (PROTONIX) EC tablet 40 mg  40 mg Oral Daily Milton Urban, DO        QUEtiapine (SEROquel) tablet 12.5 mg  12.5 mg Oral Nightly Milton Urban, DO        sertraline (ZOLOFT) tablet 150 mg  150 mg Oral Daily Milton Urban, DO        sodium chloride 0.9 % flush 10 mL  10 mL Intravenous PRN Milton Urban, DO        sodium chloride 0.9 % flush 10 mL  10 mL Intravenous Q12H Milton Urban, DO        sodium chloride 0.9 % flush 10 mL  10 mL Intravenous PRN Milton Urban, DO        sodium chloride 0.9 % infusion 40 mL  40 mL Intravenous PRN Milton Urban, DO        sodium chloride nasal spray 2 spray  2 spray Nasal PRN Milton Urban, DO        traZODone (DESYREL) tablet 100 mg  100 mg Oral Nightly PRN Milton Urban, DO        vitamin D3 capsule 5,000 Units  5,000 Units Oral Daily Milton Urban, DO             Vital Signs   Temp:  [98.4 °F (36.9 °C)-98.8 °F (37.1 °C)] 98.4 °F (36.9 °C)  Heart Rate:  [88-96] 92  Resp:  [18-20] 18  BP: (103-150)/(77-99) 138/92    Physical Exam:     General Appearance:  Alert and cooperative, not in any acute distress.   Head:  Atraumatic and normocephalic, without obvious abnormality.   Eyes:          PERRLA, conjunctivae and sclerae normal, no Icterus. No pallor. Extraocular movements are within normal limits.   Throat: No oral lesions, no thrush, oral mucosa moist.   Neck: Supple, trachea midline, no thyromegaly, no carotid bruit.   Respiratory/Lungs:   Breath sounds heard bilaterally equally.   No crackles or wheezing. No Pleural rub or bronchial breathing. Normal respiratory effort.    Cardiovascular/Heart:  Normal S1 and S2, no murmur. No edema   GI/Abdomen:   Soft, tender to palpation greatest in the LLQ, non-distended, abdominal wall diastasis soft without evidence of incarceration. Active bowel sounds on auscultation.                 Musculoskeletal/ Extremities:   Moves all extremities well   Skin: No bleeding, bruising or rash, no induration   Psychiatric : Alert and oriented ×3.  No depression or anxiety    Neurologic: Cranial nerves 2 - 12 grossly intact, sensation intact, Motor power is normal and equal bilaterally.     Results Review:  Lab Results (last 24 hours)       Procedure Component Value Units Date/Time    [facility] Draw [[id number]] Collected: 07/22/24 0929    Specimen: Blood Updated: 07/22/24 1601    Narrative:      The following orders were created for panel order [facility] Draw.  Procedure                               Abnormality         Status                     ---------                               -----------         ------                     Green Top (Gel)[[id number]]                                  Final result               Lavender Top[[id number]]                                     Final result               Gold Top - SST[[id number]]                                                              Light Blue Top[[id number]]                                   Final result                 Please view results for these tests on the individual orders.    Blood Culture With LILIANA - Blood, Hand, Left [[id number]] Collected: 07/22/24 1337    Specimen: Blood from Hand, Left Updated: 07/22/24 1358    Blood Culture With LILIANA - Blood, Arm, Left [[id number]] Collected: 07/22/24 1337    Specimen: Blood from Arm, Left Updated: 07/22/24 1358    High Sensitivity Troponin T 2Hr [[id number]]  (Abnormal) Collected: 07/22/24 1124    Specimen: Blood from Arm, Right Updated: 07/22/24 1208     HS Troponin T 259  ng/L      Troponin T Delta -30 ng/L     Narrative:      High Sensitive Troponin T Reference Range:  <14.0 ng/L- Negative Female for AMI  <22.0 ng/L- Negative Male for AMI  >=14 - Abnormal Female indicating possible myocardial injury.  >=22 - Abnormal Male indicating possible myocardial injury.   Clinicians would have to utilize clinical acumen, EKG, Troponin, and serial changes to determine if it is an Acute Myocardial Infarction or myocardial injury due to an underlying chronic condition.         Lactic Acid, Plasma [[id number]]  (Normal) Collected: 07/22/24 0929    Specimen: Blood Updated: 07/22/24 1040     Lactate 1.2 mmol/L     Single High Sensitivity Troponin T [[id number]]  (Abnormal) Collected: 07/22/24 0929    Specimen: Blood Updated: 07/22/24 1017     HS Troponin T 289 ng/L     Narrative:      High Sensitive Troponin T Reference Range:  <14.0 ng/L- Negative Female for AMI  <22.0 ng/L- Negative Male for AMI  >=14 - Abnormal Female indicating possible myocardial injury.  >=22 - Abnormal Male indicating possible myocardial injury.   Clinicians would have to utilize clinical acumen, EKG, Troponin, and serial changes to determine if it is an Acute Myocardial Infarction or myocardial injury due to an underlying chronic condition.         Comprehensive Metabolic Panel [[id number]]  (Abnormal) Collected: 07/22/24 0929    Specimen: Blood Updated: 07/22/24 1004     Glucose 140 mg/dL      BUN 17 mg/dL      Creatinine 0.65 mg/dL      Sodium 142 mmol/L      Potassium 3.5 mmol/L      Chloride 96 mmol/L      CO2 36.4 mmol/L      Calcium 8.7 mg/dL      Total Protein 6.1 g/dL      Albumin 3.4 g/dL      ALT (SGPT) 18 U/L      AST (SGOT) 37 U/L      Alkaline Phosphatase 192 U/L      Total Bilirubin 0.2 mg/dL      Globulin 2.7 gm/dL      A/G Ratio 1.3 g/dL      BUN/Creatinine Ratio 26.2     Anion Gap 9.6 mmol/L      eGFR 90.8 mL/min/1.73     Narrative:      GFR Normal >60  Chronic Kidney Disease <60  Kidney Failure <15    The GFR  formula is only valid for adults with stable renal function between ages 18 and 70.    Lipase [[id number]]  (Abnormal) Collected: 07/22/24 0929    Specimen: Blood Updated: 07/22/24 1004     Lipase 10 U/L     Green Top (Gel) [[id number]] Collected: 07/22/24 0929    Specimen: Blood Updated: 07/22/24 0942     Extra Tube Hold for add-ons.     Comment: Auto resulted.       Lavender Top [[id number]] Collected: 07/22/24 0929    Specimen: Blood Updated: 07/22/24 0942     Extra Tube hold for add-on     Comment: Auto resulted       Light Blue Top [[id number]] Collected: 07/22/24 0929    Specimen: Blood Updated: 07/22/24 0942     Extra Tube Hold for add-ons.     Comment: Auto resulted       CBC & Differential [[id number]]  (Abnormal) Collected: 07/22/24 0929    Specimen: Blood Updated: 07/22/24 0941    Narrative:      The following orders were created for panel order CBC & Differential.  Procedure                               Abnormality         Status                     ---------                               -----------         ------                     CBC Auto Differential[[id number]]        Abnormal            Final result                 Please view results for these tests on the individual orders.    CBC Auto Differential [[id number]]  (Abnormal) Collected: 07/22/24 0929    Specimen: Blood Updated: 07/22/24 0941     WBC 13.72 10*3/mm3      RBC 4.26 10*6/mm3      Hemoglobin 11.4 g/dL      Hematocrit 37.4 %      MCV 87.8 fL      MCH 26.8 pg      MCHC 30.5 g/dL      RDW 15.0 %      RDW-SD 47.8 fl      MPV 10.4 fL      Platelets 262 10*3/mm3      Neutrophil % 88.7 %      Lymphocyte % 4.7 %      Monocyte % 5.9 %      Eosinophil % 0.0 %      Basophil % 0.1 %      Immature Grans % 0.6 %      Neutrophils, Absolute 12.17 10*3/mm3      Lymphocytes, Absolute 0.64 10*3/mm3      Monocytes, Absolute 0.81 10*3/mm3      Eosinophils, Absolute 0.00 10*3/mm3      Basophils, Absolute 0.02 10*3/mm3      Immature Grans, Absolute 0.08 10*3/mm3       nRBC 0.0 /100 WBC             ASSESSMENT/PLAN:      Bowel obstruction    Coronary artery disease involving native coronary artery of native heart without angina pectoris    COPD (chronic obstructive pulmonary disease)    Impaired mobility and ADLs    Enteritis    Diverticulitis    Elevated troponin    Patient is a 79 year old female admitted to the hospital with sigmoid diverticulitis and concern for possible bowel obstruction. Patient is most tender in the left lower quadrant and I think most of her symptoms stem from acute sigmoid diverticulitis likely instigated by recent constipation. She does have an abdominal wall diastasis, but there does not seem to be a significant obstruction here. On physical exam this area is soft and reducible. I recommend IV antibiotics, pain control, bowel rest, and IV fluids. I encouraged patient to ambulate as tolerated. This was also discussed with Dr. Urban at bedside. Cardiology also to evaluate patient with elevated troponin.     I discussed the patients findings and my recommendations with patient    Bianca B DO [place]  07/22/24  16:58 EDT

## 2024-07-22 NOTE — TELEPHONE ENCOUNTER
Rx Refill Note  Requested Prescriptions     Pending Prescriptions Disp Refills    clonazePAM (KlonoPIN) 1 MG tablet [Pharmacy Med Name: clonazePAM 1MG TABS*] 60 tablet      Sig: TAKE 1 TABLET BY MOUTH 2 (TWO) TIMES A DAY AS NEEDED FOR ANXIETY.      Last office visit with prescribing clinician: 06/05/2024   Last telemedicine visit with prescribing clinician: Visit date not found   Next office visit with prescribing clinician: 07/28/2024    Adrianna Myers MA  07/22/24, 10:24 EDT

## 2024-07-23 ENCOUNTER — APPOINTMENT (OUTPATIENT)
Dept: CARDIOLOGY | Facility: HOSPITAL | Age: 79
End: 2024-07-23
Payer: MEDICARE

## 2024-07-23 LAB
ALBUMIN SERPL-MCNC: 2.8 G/DL (ref 3.5–5.2)
ALBUMIN/GLOB SERPL: 1 G/DL
ALP SERPL-CCNC: 163 U/L (ref 39–117)
ALT SERPL W P-5'-P-CCNC: 20 U/L (ref 1–33)
ANION GAP SERPL CALCULATED.3IONS-SCNC: 12.7 MMOL/L (ref 5–15)
AST SERPL-CCNC: 56 U/L (ref 1–32)
BH CV ECHO MEAS - AO MAX PG: 11.7 MMHG
BH CV ECHO MEAS - AO MEAN PG: 6 MMHG
BH CV ECHO MEAS - AO ROOT DIAM: 2.7 CM
BH CV ECHO MEAS - AO V2 MAX: 171 CM/SEC
BH CV ECHO MEAS - AO V2 VTI: 26.7 CM
BH CV ECHO MEAS - AVA(I,D): 2.08 CM2
BH CV ECHO MEAS - EDV(CUBED): 73 ML
BH CV ECHO MEAS - EDV(MOD-SP2): 58.3 ML
BH CV ECHO MEAS - EDV(MOD-SP4): 98.4 ML
BH CV ECHO MEAS - EF(MOD-BP): 41.1 %
BH CV ECHO MEAS - EF(MOD-SP2): 42.7 %
BH CV ECHO MEAS - EF(MOD-SP4): 42.5 %
BH CV ECHO MEAS - ESV(CUBED): 24.9 ML
BH CV ECHO MEAS - ESV(MOD-SP2): 33.4 ML
BH CV ECHO MEAS - ESV(MOD-SP4): 56.6 ML
BH CV ECHO MEAS - FS: 30.1 %
BH CV ECHO MEAS - IVS/LVPW: 1.74 CM
BH CV ECHO MEAS - IVSD: 1.36 CM
BH CV ECHO MEAS - LA DIMENSION: 3.8 CM
BH CV ECHO MEAS - LAT PEAK E' VEL: 4.2 CM/SEC
BH CV ECHO MEAS - LV DIASTOLIC VOL/BSA (35-75): 56.4 CM2
BH CV ECHO MEAS - LV MASS(C)D: 149.9 GRAMS
BH CV ECHO MEAS - LV MAX PG: 10.4 MMHG
BH CV ECHO MEAS - LV MEAN PG: 5 MMHG
BH CV ECHO MEAS - LV SYSTOLIC VOL/BSA (12-30): 32.4 CM2
BH CV ECHO MEAS - LV V1 MAX: 161 CM/SEC
BH CV ECHO MEAS - LV V1 VTI: 24.8 CM
BH CV ECHO MEAS - LVIDD: 4.2 CM
BH CV ECHO MEAS - LVIDS: 2.9 CM
BH CV ECHO MEAS - LVOT AREA: 2.24 CM2
BH CV ECHO MEAS - LVOT DIAM: 1.69 CM
BH CV ECHO MEAS - LVPWD: 0.78 CM
BH CV ECHO MEAS - MED PEAK E' VEL: 3.6 CM/SEC
BH CV ECHO MEAS - MV A MAX VEL: 116 CM/SEC
BH CV ECHO MEAS - MV DEC SLOPE: 532 CM/SEC2
BH CV ECHO MEAS - MV DEC TIME: 0.1 SEC
BH CV ECHO MEAS - MV E MAX VEL: 55.1 CM/SEC
BH CV ECHO MEAS - MV E/A: 0.48
BH CV ECHO MEAS - MV MAX PG: 7.4 MMHG
BH CV ECHO MEAS - MV MEAN PG: 4 MMHG
BH CV ECHO MEAS - MV V2 VTI: 21.2 CM
BH CV ECHO MEAS - MVA(VTI): 2.6 CM2
BH CV ECHO MEAS - PA ACC TIME: 0.12 SEC
BH CV ECHO MEAS - PA V2 MAX: 122 CM/SEC
BH CV ECHO MEAS - RAP SYSTOLE: 3 MMHG
BH CV ECHO MEAS - RV MAX PG: 2.6 MMHG
BH CV ECHO MEAS - RV V1 MAX: 81.3 CM/SEC
BH CV ECHO MEAS - RV V1 VTI: 14.5 CM
BH CV ECHO MEAS - SV(LVOT): 55.6 ML
BH CV ECHO MEAS - SV(MOD-SP2): 24.9 ML
BH CV ECHO MEAS - SV(MOD-SP4): 41.8 ML
BH CV ECHO MEAS - SVI(LVOT): 31.9 ML/M2
BH CV ECHO MEAS - SVI(MOD-SP2): 14.3 ML/M2
BH CV ECHO MEAS - SVI(MOD-SP4): 23.9 ML/M2
BH CV ECHO MEAS - TAPSE (>1.6): 1.34 CM
BH CV ECHO MEASUREMENTS AVERAGE E/E' RATIO: 14.13
BH CV XLRA - RV BASE: 2.9 CM
BH CV XLRA - RV MID: 2.5 CM
BH CV XLRA - TDI S': 9.5 CM/SEC
BILIRUB SERPL-MCNC: 0.2 MG/DL (ref 0–1.2)
BUN SERPL-MCNC: 16 MG/DL (ref 8–23)
BUN/CREAT SERPL: 34.8 (ref 7–25)
CALCIUM SPEC-SCNC: 8.8 MG/DL (ref 8.6–10.5)
CHLORIDE SERPL-SCNC: 101 MMOL/L (ref 98–107)
CO2 SERPL-SCNC: 31.3 MMOL/L (ref 22–29)
CREAT SERPL-MCNC: 0.46 MG/DL (ref 0.57–1)
DEPRECATED RDW RBC AUTO: 49.5 FL (ref 37–54)
EGFRCR SERPLBLD CKD-EPI 2021: 98.7 ML/MIN/1.73
ERYTHROCYTE [DISTWIDTH] IN BLOOD BY AUTOMATED COUNT: 15.4 % (ref 12.3–15.4)
GLOBULIN UR ELPH-MCNC: 2.9 GM/DL
GLUCOSE SERPL-MCNC: 105 MG/DL (ref 65–99)
HCT VFR BLD AUTO: 34.6 % (ref 34–46.6)
HGB BLD-MCNC: 10.3 G/DL (ref 12–15.9)
LEFT ATRIUM VOLUME INDEX: 21.7 ML/M2
LV EF 2D ECHO EST: 40 %
MAGNESIUM SERPL-MCNC: 1.8 MG/DL (ref 1.6–2.4)
MCH RBC QN AUTO: 26.5 PG (ref 26.6–33)
MCHC RBC AUTO-ENTMCNC: 29.8 G/DL (ref 31.5–35.7)
MCV RBC AUTO: 88.9 FL (ref 79–97)
PHOSPHATE SERPL-MCNC: 3.3 MG/DL (ref 2.5–4.5)
PLATELET # BLD AUTO: 220 10*3/MM3 (ref 140–450)
PMV BLD AUTO: 11.2 FL (ref 6–12)
POTASSIUM SERPL-SCNC: 3.5 MMOL/L (ref 3.5–5.2)
PROT SERPL-MCNC: 5.7 G/DL (ref 6–8.5)
RBC # BLD AUTO: 3.89 10*6/MM3 (ref 3.77–5.28)
SODIUM SERPL-SCNC: 145 MMOL/L (ref 136–145)
TROPONIN T SERPL HS-MCNC: 375 NG/L
WBC NRBC COR # BLD AUTO: 12.38 10*3/MM3 (ref 3.4–10.8)

## 2024-07-23 PROCEDURE — 94799 UNLISTED PULMONARY SVC/PX: CPT

## 2024-07-23 PROCEDURE — 93308 TTE F-UP OR LMTD: CPT | Performed by: INTERNAL MEDICINE

## 2024-07-23 PROCEDURE — 93306 TTE W/DOPPLER COMPLETE: CPT | Performed by: INTERNAL MEDICINE

## 2024-07-23 PROCEDURE — 99222 1ST HOSP IP/OBS MODERATE 55: CPT | Performed by: INTERNAL MEDICINE

## 2024-07-23 PROCEDURE — 99232 SBSQ HOSP IP/OBS MODERATE 35: CPT | Performed by: STUDENT IN AN ORGANIZED HEALTH CARE EDUCATION/TRAINING PROGRAM

## 2024-07-23 PROCEDURE — 80053 COMPREHEN METABOLIC PANEL: CPT | Performed by: INTERNAL MEDICINE

## 2024-07-23 PROCEDURE — 99232 SBSQ HOSP IP/OBS MODERATE 35: CPT | Performed by: NURSE PRACTITIONER

## 2024-07-23 PROCEDURE — 93308 TTE F-UP OR LMTD: CPT

## 2024-07-23 PROCEDURE — 84100 ASSAY OF PHOSPHORUS: CPT | Performed by: INTERNAL MEDICINE

## 2024-07-23 PROCEDURE — 25010000002 CEFTRIAXONE PER 250 MG: Performed by: INTERNAL MEDICINE

## 2024-07-23 PROCEDURE — 25010000002 METRONIDAZOLE 500 MG/100ML SOLUTION: Performed by: INTERNAL MEDICINE

## 2024-07-23 PROCEDURE — 93306 TTE W/DOPPLER COMPLETE: CPT

## 2024-07-23 PROCEDURE — 84484 ASSAY OF TROPONIN QUANT: CPT | Performed by: INTERNAL MEDICINE

## 2024-07-23 PROCEDURE — 63710000001 ONDANSETRON ODT 4 MG TABLET DISPERSIBLE: Performed by: INTERNAL MEDICINE

## 2024-07-23 PROCEDURE — 25010000002 SULFUR HEXAFLUORIDE MICROSPH 60.7-25 MG RECONSTITUTED SUSPENSION: Performed by: INTERNAL MEDICINE

## 2024-07-23 PROCEDURE — 94664 DEMO&/EVAL PT USE INHALER: CPT

## 2024-07-23 PROCEDURE — 83735 ASSAY OF MAGNESIUM: CPT | Performed by: INTERNAL MEDICINE

## 2024-07-23 PROCEDURE — 85027 COMPLETE CBC AUTOMATED: CPT | Performed by: INTERNAL MEDICINE

## 2024-07-23 PROCEDURE — 94761 N-INVAS EAR/PLS OXIMETRY MLT: CPT

## 2024-07-23 RX ORDER — ATORVASTATIN CALCIUM 40 MG/1
40 TABLET, FILM COATED ORAL DAILY
Status: DISCONTINUED | OUTPATIENT
Start: 2024-07-24 | End: 2024-07-26 | Stop reason: HOSPADM

## 2024-07-23 RX ORDER — METOPROLOL SUCCINATE 25 MG/1
25 TABLET, EXTENDED RELEASE ORAL
Status: DISCONTINUED | OUTPATIENT
Start: 2024-07-23 | End: 2024-07-26 | Stop reason: HOSPADM

## 2024-07-23 RX ADMIN — Medication 5000 UNITS: at 09:14

## 2024-07-23 RX ADMIN — AMIODARONE HYDROCHLORIDE 200 MG: 200 TABLET ORAL at 09:15

## 2024-07-23 RX ADMIN — DULOXETINE HYDROCHLORIDE 60 MG: 30 CAPSULE, DELAYED RELEASE ORAL at 09:13

## 2024-07-23 RX ADMIN — SULFUR HEXAFLUORIDE 2 ML: KIT at 14:46

## 2024-07-23 RX ADMIN — QUETIAPINE FUMARATE 12.5 MG: 25 TABLET ORAL at 20:00

## 2024-07-23 RX ADMIN — APIXABAN 5 MG: 5 TABLET, FILM COATED ORAL at 09:14

## 2024-07-23 RX ADMIN — Medication 10 ML: at 09:15

## 2024-07-23 RX ADMIN — IPRATROPIUM BROMIDE AND ALBUTEROL SULFATE 3 ML: .5; 3 SOLUTION RESPIRATORY (INHALATION) at 18:29

## 2024-07-23 RX ADMIN — METOPROLOL SUCCINATE 25 MG: 25 TABLET, EXTENDED RELEASE ORAL at 12:15

## 2024-07-23 RX ADMIN — METRONIDAZOLE 500 MG: 5 INJECTION, SOLUTION INTRAVENOUS at 16:33

## 2024-07-23 RX ADMIN — OXYCODONE HYDROCHLORIDE AND ACETAMINOPHEN 1 TABLET: 7.5; 325 TABLET ORAL at 20:01

## 2024-07-23 RX ADMIN — IPRATROPIUM BROMIDE AND ALBUTEROL SULFATE 3 ML: .5; 3 SOLUTION RESPIRATORY (INHALATION) at 07:01

## 2024-07-23 RX ADMIN — ATORVASTATIN CALCIUM 20 MG: 20 TABLET, FILM COATED ORAL at 09:15

## 2024-07-23 RX ADMIN — METRONIDAZOLE 500 MG: 5 INJECTION, SOLUTION INTRAVENOUS at 00:57

## 2024-07-23 RX ADMIN — DULOXETINE HYDROCHLORIDE 60 MG: 30 CAPSULE, DELAYED RELEASE ORAL at 20:00

## 2024-07-23 RX ADMIN — CETIRIZINE HYDROCHLORIDE 10 MG: 10 TABLET, FILM COATED ORAL at 09:14

## 2024-07-23 RX ADMIN — CLOTRIMAZOLE AND BETAMETHASONE DIPROPIONATE 1 APPLICATION: 10; .5 CREAM TOPICAL at 09:14

## 2024-07-23 RX ADMIN — OXYCODONE HYDROCHLORIDE AND ACETAMINOPHEN 1 TABLET: 7.5; 325 TABLET ORAL at 04:56

## 2024-07-23 RX ADMIN — APIXABAN 5 MG: 5 TABLET, FILM COATED ORAL at 20:01

## 2024-07-23 RX ADMIN — BUDESONIDE 0.5 MG: 0.5 INHALANT RESPIRATORY (INHALATION) at 07:01

## 2024-07-23 RX ADMIN — ONDANSETRON 4 MG: 4 TABLET, ORALLY DISINTEGRATING ORAL at 20:14

## 2024-07-23 RX ADMIN — CLONAZEPAM 1 MG: 0.5 TABLET ORAL at 21:02

## 2024-07-23 RX ADMIN — CEFTRIAXONE SODIUM 2000 MG: 2 INJECTION, POWDER, FOR SOLUTION INTRAMUSCULAR; INTRAVENOUS at 16:33

## 2024-07-23 RX ADMIN — FLUTICASONE PROPIONATE 2 SPRAY: 50 SPRAY, METERED NASAL at 09:14

## 2024-07-23 RX ADMIN — FERROUS SULFATE TAB EC 324 MG (65 MG FE EQUIVALENT) 324 MG: 324 (65 FE) TABLET DELAYED RESPONSE at 09:14

## 2024-07-23 RX ADMIN — METRONIDAZOLE 500 MG: 5 INJECTION, SOLUTION INTRAVENOUS at 09:14

## 2024-07-23 RX ADMIN — SENNOSIDES AND DOCUSATE SODIUM 2 TABLET: 50; 8.6 TABLET ORAL at 20:01

## 2024-07-23 RX ADMIN — SENNOSIDES AND DOCUSATE SODIUM 2 TABLET: 50; 8.6 TABLET ORAL at 09:14

## 2024-07-23 RX ADMIN — BUDESONIDE 0.5 MG: 0.5 INHALANT RESPIRATORY (INHALATION) at 18:29

## 2024-07-23 RX ADMIN — Medication 10 ML: at 21:02

## 2024-07-23 RX ADMIN — SERTRALINE 150 MG: 50 TABLET, FILM COATED ORAL at 09:14

## 2024-07-23 RX ADMIN — PANTOPRAZOLE SODIUM 40 MG: 40 TABLET, DELAYED RELEASE ORAL at 09:14

## 2024-07-23 RX ADMIN — Medication 5 MG: at 20:00

## 2024-07-23 RX ADMIN — CLOTRIMAZOLE AND BETAMETHASONE DIPROPIONATE 1 APPLICATION: 10; .5 CREAM TOPICAL at 20:15

## 2024-07-23 RX ADMIN — DILTIAZEM HYDROCHLORIDE 180 MG: 180 CAPSULE, COATED, EXTENDED RELEASE ORAL at 09:14

## 2024-07-23 RX ADMIN — OXYCODONE HYDROCHLORIDE AND ACETAMINOPHEN 1 TABLET: 7.5; 325 TABLET ORAL at 15:57

## 2024-07-23 NOTE — PROGRESS NOTES
[facility]    Name:  Gabi Dudley   Age:  79 y.o.  Sex:  female  :  1945  MRN:  [id number]   Visit Number:  [id number]  Admission Date:  2024  Date Of Service:  24  Primary Care Physician:  Krystina Saunders DO     LOS: 1 day :    Chief Complaint:      Abdominal pain    Subjective:    Patient seen and examined.  She is sleepy.  States abdominal pain is much improved.  Denies any current chest pain.  Advised cardiology would be seeing her as well today.  Is passing some flatus.  Denies bowel movement.  Advised that she does take several sedating medications.  Constipation is her baseline.  She does have numerous bruises to her bilateral lower extremities.  States she falls often at home.    Hospital Course:    Ms. Dudley is a pleasant 79-year-old female with pertinent past medical history of atrial fibrillation on Eliquis, anemia, depression, coronary artery disease, COPD on 4 L of oxygen at baseline, gastritis, GERD, prior bowel obstruction status post adhesiolysis with bowel resection and reduction of volvulus in  per Dr. Isaac.  Patient presented to emergency department due to abdominal pain, nausea, vomiting, and diarrhea.  Onset 2 weeks ago with worsening symptoms noted.  Patient currently lives with her daughter.  Had noticed prior bowel movement hard.  Denied fever.  No recent colonoscopy.    Upon ED presentation patient hemodynamically stable.  Pertinent labs and imaging included HS trop 289 and then 259, WBC 13.72, CT abdomen enteritis/diverticultis, mild obstruction, worsened L3 compression fracture.  General surgeon Dr. Isaac consulted.  Hospitalist consulted for further medical management.  Patient continued on Rocephin and Flagyl with severe pain noted in left lower quadrant likely diverticulitis.  Patient continued with supportive IV fluids/Zofran/pain control.  Troponin was noted to increase to 375 with a.m. labs.  Cardiology  consulted.  Patient with intermittent chest pain noted over the past several weeks.  Cardiology suggested coronary angiogram with patient refusing at this time.  Cardiology recommended aspirin 81 mg daily, atorvastatin 40 mg at bedtime, transition diltiazem to low-dose metoprolol with echo pending.    Review of Systems:     All systems were reviewed and negative except as mentioned in subjective, assessment and plan.    Vital Signs:    Temp:  [97.6 °F (36.4 °C)-99.8 °F (37.7 °C)] 98.3 °F (36.8 °C)  Heart Rate:  [] 101  Resp:  [16-20] 18  BP: (103-155)/(64-92) 155/86    Intake and output:    I/O last 3 completed shifts:  In: 60 [P.O.:60]  Out: -   No intake/output data recorded.    Physical Examination:    General Appearance:  Alert and cooperative.  Chronically ill middle-aged female.   Head:  Atraumatic and normocephalic.   Eyes: Conjunctivae and sclerae normal, no icterus. No pallor.   Throat: No oral lesions, no thrush, oral mucosa moist.   Neck: Supple, trachea midline, no thyromegaly.   Lungs:   Breath sounds heard bilaterally equally.  No wheezing or crackles. No Pleural rub or bronchial breathing.  On 4 L nasal cannula unlabored.   Heart:  Normal S1 and S2, no murmur, no gallop, no rub. No JVD.   Abdomen:   Normal bowel sounds, no masses, no organomegaly. Soft, left lower quadrant tenderness with palpation noted, nondistended, no rebound tenderness.   Extremities: Supple, no edema, no cyanosis, no clubbing.   Skin: No bleeding or rash.  Scattered ecchymosis bilateral lower extremities.   Neurologic: Alert and oriented x 3. No facial asymmetry. Moves all four limbs. No tremors.  Generalized weakness.     Laboratory results:    Results from last 7 days   Lab Units 07/23/24  0635 07/22/24  0929   SODIUM mmol/L 145 142   POTASSIUM mmol/L 3.5 3.5   CHLORIDE mmol/L 101 96*   CO2 mmol/L 31.3* 36.4*   BUN mg/dL 16 17   CREATININE mg/dL 0.46* 0.65   CALCIUM mg/dL 8.8 8.7   BILIRUBIN mg/dL 0.2 0.2   ALK PHOS U/L  163* 192*   ALT (SGPT) U/L 20 18   AST (SGOT) U/L 56* 37*   GLUCOSE mg/dL 105* 140*     Results from last 7 days   Lab Units 07/23/24  0635 07/22/24  0929   WBC 10*3/mm3 12.38* 13.72*   HEMOGLOBIN g/dL 10.3* 11.4*   HEMATOCRIT % 34.6 37.4   PLATELETS 10*3/mm3 220 262         Results from last 7 days   Lab Units 07/23/24  0635 07/22/24  1124 07/22/24  0929   HSTROP T ng/L 375* 259* 289*     Results from last 7 days   Lab Units 07/22/24  1337   BLOODCX  No growth at less than 24 hours  No growth at less than 24 hours     Recent Labs     08/15/23  0715 08/16/23  0731 08/17/23  0958   PHART 7.364 7.358 7.452*   FVF1UYV 55.3* 65.5* 55.6*   PO2ART 306.0* 70.7* 88.0   FVX3LSR 31.5* 36.8* 38.8*   BASEEXCESS 5.0* 9.6* 13.1*      I have reviewed the patient's laboratory results.    Radiology results:    CT Abdomen Pelvis With Contrast    Result Date: 7/22/2024  PROCEDURE: CT ABDOMEN PELVIS W CONTRAST-  HISTORY:  concern for bowel obstruction, h/o multiple bowel obstructions in the past  COMPARISON: June 13, 2024.  TECHNIQUE: Multiple axial CT images were obtained from the lung bases through the pubic symphysis following the administration of Isovue 300 and oral contrast.  FINDINGS:  ABDOMEN: The lung bases are clear. The heart is normal in size. The liver shows a stable hypodense hepatic cyst in the subdiaphragmatic left lobe. There is mild intra and extrahepatic biliary ductal dilatation with cholecystectomy clips. Findings are stable. Stable bilateral well-circumscribed hypodense renal lesions are seen compatible with cysts. 1 of these shows rim calcification which is stable. Vascular calcifications are present. There is no aortic aneurysm. There is mild dilation of the pancreatic duct which is stable. There is stable appearance of the midline diastases of the abdominal wall containing colon. There is a moderate stool burden of the ascending and proximal transverse colon to the level of the diastases. There are  decompressed distal bowel loops to this. Findings may represent some level of obstruction. There is wall thickening of jejunal loops in the left abdomen possibly due to enteritis. The spleen is unremarkable. No adrenal mass is present.  The pancreas is normal. The aorta is normal in caliber. There is a severe compression fracture of L3 which appeared mild on the previous exam.  PELVIS: The appendix is not identified. There is colonic diverticulosis. There is new mild wall thickening of the proximal sigmoid colon and mild infiltration of the adjacent surrounding fat consistent with diverticulitis. The urinary bladder is unremarkable. There is a minimal amount of free fluid in the pelvis. No rim-enhancing fluid collection is identified. There is an ileocolic anastomosis in the right lower quadrant. The appendix is surgically absent.      Impression: 1. Sigmoid diverticulitis. 2. Partial mild obstruction of the transverse colon secondary to abdominal wall diastases. 3. Significant worsening of L3 compression fracture. 4. Jejunal wall thickening may be due to possible enteritis.   CTDI: 5.29 mGy DLP: 249.1 mGy.cm   This study was performed with techniques to keep radiation doses as low as reasonably achievable (ALARA). Individualized dose reduction techniques using automated exposure control or adjustment of mA and/or kV according to the patient size were employed.      Images were reviewed, interpreted, and dictated by Dr. Angeles Collins MD Transcribed by Reyna Gerard PA-C.  This report was signed and finalized on 7/22/2024 1:27 PM by Angeles Collins MD.     I have reviewed the patient's radiology reports.    Medication Review:     I have reviewed the patient's active and prn medications.     Problem List:      Bowel obstruction    Coronary artery disease involving native coronary artery of native heart without angina pectoris    COPD (chronic obstructive pulmonary disease)    Impaired mobility and ADLs    Enteritis     Diverticulitis    Elevated troponin      Assessment:    Left lower quadrant pain, acute diverticulitis, POA  Concern for possible partial bowel obstruction, POA  Elevated troponin, POA  Coronary artery disease  COPD on 4 L  Compression fracture L3  Atrial fibrillation on Eliquis    Plan:    Left lower quadrant pain/diverticulitis/questionable partial bowel obstruction  -Continue Rocephin and Flagyl for left lower quadrant pain likely acute diverticulitis  -General surgeon Dr. Isaac following.  -Will advance diet to clears.    Elevated troponin  -Cardiology following.  No current chest pain.  -Diltiazem discontinued and metoprolol added for antianginal purposes.  -Atorvastatin increased.  -Will add aspirin upon discharge.    -Continue home medications as appropriate.  -Patient does have follow-up with Dr. Kapadia on Thursday.  If still hospitalized will consult orthopedics.  -Attempted to call daughter Farida echeverria.  No answer.  -PT/OT evaluations pending.    I have reviewed the copied text and it is accurate as of 7/23/2024     DVT Prophylaxis: Eliquis  Code Status: Full code  Diet: Clears  Discharge Plan: Likely home tomorrow     Britt Nascimento, APRN  07/23/24  11:12 EDT    Dictated utilizing Dragon dictation.

## 2024-07-23 NOTE — THERAPY EVALUATION
Pt declined PT eval this date. Pt states that she is too tired and her legs will not work to hold her up today. PT to f/u tomorrow.

## 2024-07-23 NOTE — PLAN OF CARE
Problem: Adjustment to Illness (Sepsis/Septic Shock)  Goal: Optimal Coping  Outcome: Ongoing, Progressing     Problem: Bleeding (Sepsis/Septic Shock)  Goal: Absence of Bleeding  Outcome: Ongoing, Progressing     Problem: Glycemic Control Impaired (Sepsis/Septic Shock)  Goal: Blood Glucose Level Within Desired Range  Outcome: Ongoing, Progressing     Problem: Infection Progression (Sepsis/Septic Shock)  Goal: Absence of Infection Signs and Symptoms  Outcome: Ongoing, Progressing  Intervention: Initiate Sepsis Management  Recent Flowsheet Documentation  Taken 7/23/2024 0508 by Aleksey Solo RN  Infection Prevention: environmental surveillance performed  Taken 7/23/2024 0400 by Aleksey Solo RN  Infection Prevention:   environmental surveillance performed   hand hygiene promoted   rest/sleep promoted   single patient room provided  Taken 7/23/2024 0200 by Aleksey Solo RN  Infection Prevention:   environmental surveillance performed   rest/sleep promoted   single patient room provided  Taken 7/22/2024 2200 by Aleksey Solo RN  Infection Prevention:   hand hygiene promoted   single patient room provided   rest/sleep promoted  Taken 7/22/2024 2000 by Aleksey Solo RN  Infection Prevention:   hand hygiene promoted   rest/sleep promoted   single patient room provided  Intervention: Promote Recovery  Recent Flowsheet Documentation  Taken 7/23/2024 0508 by Aleksey Solo RN  Activity Management: activity minimized  Taken 7/23/2024 0400 by Aleksey Solo RN  Activity Management: activity minimized  Taken 7/23/2024 0200 by Aleksey Solo RN  Activity Management: activity minimized  Taken 7/23/2024 0000 by Aleksey Solo RN  Activity Management: activity minimized  Taken 7/22/2024 2200 by Aleksey Solo RN  Activity Management: activity minimized  Taken 7/22/2024 2000 by Aleksey Solo RN  Activity Management: activity minimized     Problem: Nutrition Impaired (Sepsis/Septic Shock)  Goal: Optimal Nutrition  Intake  Outcome: Ongoing, Progressing     Problem: Adult Inpatient Plan of Care  Goal: Plan of Care Review  Outcome: Ongoing, Progressing  Flowsheets (Taken 7/23/2024 0522)  Progress: improving  Plan of Care Reviewed With:   patient   daughter  Outcome Evaluation: VSS. pt able to rest for a few hours through night. pt up to BSC x1 assist. pt c/o abdominal pain following movement. PRN given. will continue to monitor.  Goal: Patient-Specific Goal (Individualized)  Outcome: Ongoing, Progressing  Goal: Absence of Hospital-Acquired Illness or Injury  Outcome: Ongoing, Progressing  Intervention: Identify and Manage Fall Risk  Recent Flowsheet Documentation  Taken 7/23/2024 0508 by Aleksey Solo RN  Safety Promotion/Fall Prevention: safety round/check completed  Taken 7/23/2024 0400 by Aleksey Solo RN  Safety Promotion/Fall Prevention: safety round/check completed  Taken 7/23/2024 0200 by Aleksey Solo RN  Safety Promotion/Fall Prevention: safety round/check completed  Taken 7/23/2024 0000 by Aleksey Solo RN  Safety Promotion/Fall Prevention: safety round/check completed  Taken 7/22/2024 2200 by Aleksey Solo RN  Safety Promotion/Fall Prevention: safety round/check completed  Taken 7/22/2024 2000 by Aleksey Solo RN  Safety Promotion/Fall Prevention:   activity supervised   assistive device/personal items within reach   fall prevention program maintained   lighting adjusted   safety round/check completed   room organization consistent  Intervention: Prevent Skin Injury  Recent Flowsheet Documentation  Taken 7/23/2024 0508 by Aleksey Solo RN  Body Position:   turned   right   legs elevated  Taken 7/23/2024 0400 by Aleksey Solo RN  Body Position: supine, legs elevated  Taken 7/23/2024 0200 by Aleksey Solo RN  Body Position:   turned   left  Taken 7/23/2024 0000 by Aleksey Solo RN  Body Position:   tilted   right  Taken 7/22/2024 2200 by Aleksey Solo RN  Body Position: supine, legs elevated  Taken 7/22/2024  2000 by Aleksey Solo RN  Body Position:   tilted   left   legs elevated  Intervention: Prevent and Manage VTE (Venous Thromboembolism) Risk  Recent Flowsheet Documentation  Taken 7/23/2024 0508 by Aleksey Solo RN  Activity Management: activity minimized  Taken 7/23/2024 0400 by Aleksey Solo RN  Activity Management: activity minimized  Taken 7/23/2024 0200 by Aleksey Solo RN  Activity Management: activity minimized  Taken 7/23/2024 0000 by Aleksey Solo RN  Activity Management: activity minimized  Taken 7/22/2024 2200 by Aleksey Solo RN  Activity Management: activity minimized  Taken 7/22/2024 2000 by Aleksey Solo RN  Activity Management: activity minimized  Range of Motion: active ROM (range of motion) encouraged  Intervention: Prevent Infection  Recent Flowsheet Documentation  Taken 7/23/2024 0508 by Aleksey Solo RN  Infection Prevention: environmental surveillance performed  Taken 7/23/2024 0400 by Aleksey Solo RN  Infection Prevention:   environmental surveillance performed   hand hygiene promoted   rest/sleep promoted   single patient room provided  Taken 7/23/2024 0200 by Aleksey Solo RN  Infection Prevention:   environmental surveillance performed   rest/sleep promoted   single patient room provided  Taken 7/22/2024 2200 by Aleksey Solo RN  Infection Prevention:   hand hygiene promoted   single patient room provided   rest/sleep promoted  Taken 7/22/2024 2000 by Aleksey Solo RN  Infection Prevention:   hand hygiene promoted   rest/sleep promoted   single patient room provided  Goal: Optimal Comfort and Wellbeing  Outcome: Ongoing, Progressing  Intervention: Monitor Pain and Promote Comfort  Recent Flowsheet Documentation  Taken 7/23/2024 0456 by Aleksey Solo RN  Pain Management Interventions:   see MAR   position adjusted  Intervention: Provide Person-Centered Care  Recent Flowsheet Documentation  Taken 7/22/2024 2000 by Aleksey Solo RN  Trust Relationship/Rapport:   care explained    reassurance provided  Goal: Readiness for Transition of Care  Outcome: Ongoing, Progressing     Problem: Skin Injury Risk Increased  Goal: Skin Health and Integrity  Outcome: Ongoing, Progressing  Intervention: Optimize Skin Protection  Recent Flowsheet Documentation  Taken 7/23/2024 0508 by Aleksey Solo RN  Head of Bed (HOB) Positioning: HOB lowered  Taken 7/23/2024 0400 by Aleksey Solo RN  Head of Bed (HOB) Positioning: HOB lowered  Taken 7/23/2024 0200 by Aleksey Solo RN  Head of Bed (HOB) Positioning: HOB lowered  Taken 7/23/2024 0000 by Aleksey Solo RN  Head of Bed (HOB) Positioning: HOB lowered  Taken 7/22/2024 2200 by Aleksey Solo RN  Head of Bed (HOB) Positioning: HOB lowered  Taken 7/22/2024 2000 by Aleksey Solo RN  Head of Bed (HOB) Positioning: HOB elevated     Problem: Fall Injury Risk  Goal: Absence of Fall and Fall-Related Injury  Outcome: Ongoing, Progressing  Intervention: Identify and Manage Contributors  Recent Flowsheet Documentation  Taken 7/22/2024 2000 by Aleksey Solo RN  Medication Review/Management: medications reviewed  Intervention: Promote Injury-Free Environment  Recent Flowsheet Documentation  Taken 7/23/2024 0508 by Aleksey Sloo RN  Safety Promotion/Fall Prevention: safety round/check completed  Taken 7/23/2024 0400 by Aleksey Solo RN  Safety Promotion/Fall Prevention: safety round/check completed  Taken 7/23/2024 0200 by Aleksey Solo RN  Safety Promotion/Fall Prevention: safety round/check completed  Taken 7/23/2024 0000 by Aleksey Solo RN  Safety Promotion/Fall Prevention: safety round/check completed  Taken 7/22/2024 2200 by Aleksey Solo RN  Safety Promotion/Fall Prevention: safety round/check completed  Taken 7/22/2024 2000 by Aleksey Solo RN  Safety Promotion/Fall Prevention:   activity supervised   assistive device/personal items within reach   fall prevention program maintained   lighting adjusted   safety round/check completed   room organization  consistent   Goal Outcome Evaluation:

## 2024-07-23 NOTE — CONSULTS
"     Psychiatric Cardiology Consult Note    Gabi Dudley  1945  [id number]  24    Requesting Physician: No ref. provider found    Chief Complaint: Abdominal pain    History of Present Illness:   Mrs. Gabi Dudley is a 79 y.o. female being seen by cardiology for elevated troponin level.  The patient has a past medical history significant for hypertension, dyslipidemia, prior pulmonary embolism, fibromyalgia, anxiety, and chronic tobacco use complicated by COPD.  Her past cardiac history is significant for atrial fibrillation and nonobstructive coronary artery disease diagnosed on remote coronary angiogram in approximately .  She presented to the [facility] for evaluation of abdominal pain.  On subsequent CT imaging, she was found to have evidence of bowel obstruction, which has been treated with conservative management.  While in the emergency department, initial troponin level was found to be elevated at 289.  It was initially downward trending at 259, however is elevated at 375 today.  On further questioning, the patient does have fluctuating episodes of chest pain.  The episodes of chest pain which the patient describes as a \"gas pain.\"  She denies any chest discomfort consistently associated with exertion.  Her nausea and abdominal pain have significantly improved since hospital admission.  Given her elevated troponin level, cardiology has been consulted for further recommendations.    Past Cardiac Testin. Last Coronary Angio:  , reportedly nonobstructive disease.  Report unavailable  2. Prior Stress Testing: Remote, with report unavailable  3. Last Echo:                           1.  Normal left ventricular size and systolic function, LVEF 60-65%.                          2.  Mild concentric LVH.                          3.  Normal LV diastolic filling pattern.                          4.  Normal right ventricular size and systolic function.     "                      5.  Mildly increased left atrial volume index.                          6.  No significant valvular abnormalities.  4. Prior Holter Monitor: 14-day Holter 7/23  1.  The predominant rhythm is sinus rhythm with an average heart rate 84 bpm.  2.  Paroxysmal atrial fibrillation with occasional rapid ventricular rates up to 157 bpm.  AF burden 21%.  Longest episode 4 hours 35 minutes.  3.  Occasional supraventricular and ventricular ectopy.  4.  No symptom diary submitted.       Review of Systems:   Review of Systems   Constitutional:  Negative for activity change, appetite change, chills, diaphoresis, fatigue, fever, unexpected weight gain and unexpected weight loss.   Eyes:  Negative for blurred vision and double vision.   Respiratory:  Negative for cough, chest tightness, shortness of breath and wheezing.    Cardiovascular:  Positive for chest pain. Negative for palpitations and leg swelling.   Gastrointestinal:  Positive for abdominal pain and nausea. Negative for anal bleeding, blood in stool and GERD.   Endocrine: Negative for cold intolerance and heat intolerance.   Genitourinary:  Negative for hematuria.   Neurological:  Negative for dizziness, syncope, weakness and light-headedness.   Hematological:  Does not bruise/bleed easily.   Psychiatric/Behavioral:  Negative for depressed mood and stress. The patient is not nervous/anxious.        Past Medical History:   Past Medical History:   Diagnosis Date    Adrenal adenoma     Anemia     Arrhythmia     Asthma     Atrial fibrillation     Back pain     Benign colonic polyp     Benign tumor of adrenal gland     Cataract     bilateral    Cholelithiasis     Chronic bronchitis     Chronic bronchitis with COPD (chronic obstructive pulmonary disease)     COPD (chronic obstructive pulmonary disease) 2005    Coronary artery disease     Depression     Diverticulosis Years ago    Elevated cholesterol     Environmental and seasonal allergies     Fibromyalgia      Fibromyalgia, primary Sometime in the 90s    Gastritis     Generalized anxiety disorder     GERD (gastroesophageal reflux disease)     H/O mammogram     Headache Years ago    Hemorrhoids     History of blood transfusion 1985    History of blood transfusion 1985    History of echocardiogram     History of endometriosis     History of nuclear stress test     Hospitalization or health care facility admission within last 6 months     5 times between 11/2022-05/2023    Hypertension     IBS (irritable bowel syndrome)     Impaired functional mobility, balance, gait, and endurance     Impaired mobility     Inverted nipple     Kidney disease     Kidney stone     Liver cyst     Low back pain Years ago    Nodular radiologic density     Nodule of left lung     On home oxygen therapy     2 liters NC QHS    Osteoarthritis     Osteopenia Several years ago    Osteoporosis     PONV (postoperative nausea and vomiting)     Renal cyst     Sinus problem     2014    Sinusitis     Skin cancer     basal cell carcinoma    SOB (shortness of breath)     Tobacco use     Urinary frequency     Urinary tract infection Years ago    Frequent UTIs    Vitamin D deficiency     Wears glasses     Wears partial dentures     upper plate       Past Surgical History:   Past Surgical History:   Procedure Laterality Date    APPENDECTOMY  1980s    CARDIAC CATHETERIZATION  2003    CATARACT EXTRACTION  2013    both eyes    CHOLECYSTECTOMY  2020    CHOLECYSTECTOMY WITH INTRAOPERATIVE CHOLANGIOGRAM N/A 10/30/2020    Procedure: CHOLECYSTECTOMY LAPAROSCOPIC INTRAOPERATIVE CHOLANGIOGRAPHY;  Surgeon: Sima Moses MD;  Location: [facility] OR;  Service: General;  Laterality: N/A;    COLON SURGERY  2020    COLONOSCOPY  03/11/2013    COLONOSCOPY  06/21/2016    COLONOSCOPY N/A 07/24/2019    Procedure: COLONOSCOPY W/ COLD FORCEP POLYPECTOMIES; HOT SNARE POLYPECTOMIES; COLD SNARE POLYPECTOMY;  Surgeon: Goyo Nunez MD;  Location: [facility] ENDOSCOPY;  Service:  Gastroenterology    COLONOSCOPY W/ BIOPSIES AND POLYPECTOMY      EXPLORATORY LAPAROTOMY N/A 11/23/2020    Procedure: colectomy, right, closure of enterotomy x 2, reduction of internal volvulus;  Surgeon: Sima Moses MD;  Location: [facility] OR;  Service: General;  Laterality: N/A;    EXPLORATORY LAPAROTOMY N/A 8/9/2023    Procedure: LAPAROTOMY EXPLORATORY WITH LYIS OF ADHESIONS AND  CENTRAL LINE INSERTION;  Surgeon: Bianca Isaac DO;  Location: [facility] OR;  Service: General;  Laterality: N/A;    HYSTERECTOMY  1980s    partial    LYSIS OF ABDOMINAL ADHESIONS      TONSILLECTOMY  1987    UPPER GASTROINTESTINAL ENDOSCOPY  01/13/2015    VAGINAL DELIVERY      x2       Family History:   Family History   Problem Relation Age of Onset    Stomach cancer Brother     Colon cancer Maternal Aunt     Brain cancer Father     Arthritis Father     Hypertension Father     Cancer Father         Father, brother, and aunt    Lung cancer Paternal Grandfather     Heart disease Mother         Mother passed away due to heart disease    Breast cancer Neg Hx     Ovarian cancer Neg Hx        Social History:   Social History     Socioeconomic History    Marital status:    Tobacco Use    Smoking status: Former     Current packs/day: 0.00     Average packs/day: 0.3 packs/day for 30.0 years (7.5 ttl pk-yrs)     Types: Cigarettes     Start date: 11/1/1990     Quit date: 11/1/2020     Years since quitting: 3.7     Passive exposure: Past    Smokeless tobacco: Never   Vaping Use    Vaping status: Never Used   Substance and Sexual Activity    Alcohol use: No    Drug use: No    Sexual activity: Not Currently     Birth control/protection: Post-menopausal       Medications:     Current Facility-Administered Medications:     acetaminophen (TYLENOL) tablet 650 mg, 650 mg, Oral, Q4H PRN, Milton Urban DO    albuterol (PROVENTIL) nebulizer solution 0.083% 2.5 mg/3mL, 2.5 mg, Nebulization, Q6H PRN, Milton Urban DO    amiodarone  (PACERONE) tablet 200 mg, 200 mg, Oral, Q24H, Milton Urbanith, DO, 200 mg at 07/23/24 0915    apixaban (ELIQUIS) tablet 5 mg, 5 mg, Oral, BID, Rajni, Milton Albert, DO, 5 mg at 07/23/24 0914    atorvastatin (LIPITOR) tablet 20 mg, 20 mg, Oral, Daily, Milton Urban Albert, DO, 20 mg at 07/23/24 0915    sennosides-docusate (PERICOLACE) 8.6-50 MG per tablet 2 tablet, 2 tablet, Oral, BID, 2 tablet at 07/23/24 0914 **AND** polyethylene glycol (MIRALAX) packet 17 g, 17 g, Oral, Daily PRN **AND** bisacodyl (DULCOLAX) EC tablet 5 mg, 5 mg, Oral, Daily PRN **AND** bisacodyl (DULCOLAX) suppository 10 mg, 10 mg, Rectal, Daily PRN, Milton Urban, DO    budesonide (PULMICORT) nebulizer solution 0.5 mg, 0.5 mg, Nebulization, BID - RT, Milton Urban Albert, DO, 0.5 mg at 07/23/24 0701    calcium carbonate (TUMS) chewable tablet 500 mg (200 mg elemental), 2 tablet, Oral, BID PRN, Milton Urban Albert, DO    cefTRIAXone (ROCEPHIN) 2,000 mg in sodium chloride 0.9 % 100 mL IVPB-VTB, 2,000 mg, Intravenous, Q24H, Milton Urban, DO, Last Rate: 200 mL/hr at 07/22/24 1749, 2,000 mg at 07/22/24 1749    cetirizine (zyrTEC) tablet 10 mg, 10 mg, Oral, Daily, Milton Urban Albert, DO, 10 mg at 07/23/24 0914    clonazePAM (KlonoPIN) tablet 1 mg, 1 mg, Oral, BID PRN, Milton Urbanith, DO, 1 mg at 07/22/24 1749    clotrimazole-betamethasone (LOTRISONE) 1-0.05 % cream 1 Application, 1 application , Topical, BID, Milton Urban DO, 1 Application at 07/23/24 0914    cyclobenzaprine (FLEXERIL) tablet 5 mg, 5 mg, Oral, TID PRN, Milton Urban DO    Diclofenac Sodium (VOLTAREN) 1 % gel 4 g, 4 g, Topical, 4x Daily PRN, Milton Urban DO    dilTIAZem CD (CARDIZEM CD) 24 hr capsule 180 mg, 180 mg, Oral, Daily, Milton Urban DO, 180 mg at 07/23/24 0914    DULoxetine (CYMBALTA) DR capsule 60 mg, 60 mg, Oral, BID, Milton Urban DO, 60 mg at 07/23/24 0913    ferrous sulfate EC tablet 324 mg, 324 mg, Oral, Daily  With Breakfast, Milton Urban DO, 324 mg at 07/23/24 0914    fluticasone (FLONASE) 50 MCG/ACT nasal spray 2 spray, 2 spray, Nasal, Daily, Milton Urban DO, 2 spray at 07/23/24 0914    ipratropium-albuterol (DUO-NEB) nebulizer solution 3 mL, 3 mL, Nebulization, Q6H While Awake - RT, Milton Urban DO, 3 mL at 07/23/24 0701    melatonin tablet 5 mg, 5 mg, Oral, Nightly, Milton Urban, DO, 5 mg at 07/22/24 2101    metroNIDAZOLE (FLAGYL) IVPB 500 mg, 500 mg, Intravenous, Q8H, Milton Urban DO, Last Rate: 200 mL/hr at 07/23/24 0914, 500 mg at 07/23/24 0914    nitroglycerin (NITROSTAT) SL tablet 0.4 mg, 0.4 mg, Sublingual, Q5 Min PRN, Milton Urban DO    ondansetron ODT (ZOFRAN-ODT) disintegrating tablet 4 mg, 4 mg, Oral, Q6H PRN **OR** ondansetron (ZOFRAN) injection 4 mg, 4 mg, Intravenous, Q6H PRN, Milton Urban DO    ondansetron ODT (ZOFRAN-ODT) disintegrating tablet 4 mg, 4 mg, Translingual, Q8H PRN, Milton Urban DO    oxyCODONE-acetaminophen (PERCOCET) 7.5-325 MG per tablet 1 tablet, 1 tablet, Oral, Q4H PRN, Milton Urban DO, 1 tablet at 07/23/24 0456    pantoprazole (PROTONIX) EC tablet 40 mg, 40 mg, Oral, Daily, Milton Urban, DO, 40 mg at 07/23/24 0914    QUEtiapine (SEROquel) tablet 12.5 mg, 12.5 mg, Oral, Nightly, Milton Urban, DO, 12.5 mg at 07/22/24 2101    sertraline (ZOLOFT) tablet 150 mg, 150 mg, Oral, Daily, Milton Urban, DO, 150 mg at 07/23/24 0914    sodium chloride 0.9 % flush 10 mL, 10 mL, Intravenous, PRN, Milton Urbanith, DO    sodium chloride 0.9 % flush 10 mL, 10 mL, Intravenous, Q12H, Milton Urban, DO, 10 mL at 07/23/24 0915    sodium chloride 0.9 % flush 10 mL, 10 mL, Intravenous, PRN, Milton Urban, DO    sodium chloride 0.9 % infusion 40 mL, 40 mL, Intravenous, PRN, Milton Urban, DO    sodium chloride nasal spray 2 spray, 2 spray, Nasal, PRN, Milton Urban, DO    traZODone (DESYREL)  tablet 100 mg, 100 mg, Oral, Nightly PRN, Milton Urban, DO    vitamin D3 capsule 5,000 Units, 5,000 Units, Oral, Daily, Milton Urban, DO, 5,000 Units at 07/23/24 0914    Allergies:   Allergies   Allergen Reactions    Ativan [Lorazepam] Mental Status Change    Doxycycline GI Intolerance       Physical Exam:  Vital Signs:   Vitals:    07/22/24 2351 07/23/24 0454 07/23/24 0701 07/23/24 0723   BP: 138/72 138/73  120/64   BP Location: Right arm Right arm  Right arm   Patient Position: Lying   Lying   Pulse:  97     Resp: 18 16 16 18   Temp: 99.8 °F (37.7 °C) 97.6 °F (36.4 °C)  98.7 °F (37.1 °C)   TempSrc: Axillary Oral  Oral   SpO2:  93%     Weight:       Height:           Physical Exam  Vitals and nursing note reviewed.   Constitutional:       General: She is not in acute distress.     Appearance: Normal appearance. She is well-developed. She is not diaphoretic.   HENT:      Head: Normocephalic and atraumatic.   Eyes:      General: No scleral icterus.     Pupils: Pupils are equal, round, and reactive to light.   Neck:      Trachea: No tracheal deviation.   Cardiovascular:      Rate and Rhythm: Normal rate and regular rhythm.      Heart sounds: Normal heart sounds. No murmur heard.     No friction rub. No gallop.      Comments: Normal JVD.  Pulmonary:      Effort: Pulmonary effort is normal. No respiratory distress.      Breath sounds: Normal breath sounds. No stridor. No wheezing or rales.   Chest:      Chest wall: No tenderness.   Abdominal:      General: Bowel sounds are normal. There is no distension.      Palpations: Abdomen is soft.      Tenderness: There is no abdominal tenderness. There is no guarding or rebound.   Musculoskeletal:         General: No swelling. Normal range of motion.      Cervical back: Neck supple. No tenderness.   Lymphadenopathy:      Cervical: No cervical adenopathy.   Skin:     General: Skin is warm and dry.      Findings: Bruising present. No erythema.   Neurological:       General: No focal deficit present.      Mental Status: She is alert and oriented to person, place, and time.   Psychiatric:         Mood and Affect: Mood normal.         Behavior: Behavior normal.         Results Review:   Results from last 7 days   Lab Units 07/23/24  0635   SODIUM mmol/L 145   POTASSIUM mmol/L 3.5   CHLORIDE mmol/L 101   CO2 mmol/L 31.3*   BUN mg/dL 16   CREATININE mg/dL 0.46*   CALCIUM mg/dL 8.8   BILIRUBIN mg/dL 0.2   ALK PHOS U/L 163*   ALT (SGPT) U/L 20   AST (SGOT) U/L 56*   GLUCOSE mg/dL 105*     Results from last 7 days   Lab Units 07/23/24  0635 07/22/24  1124 07/22/24  0929   HSTROP T ng/L 375* 259* 289*     Results from last 7 days   Lab Units 07/23/24  0635 07/22/24  0929   WBC 10*3/mm3 12.38* 13.72*   HEMOGLOBIN g/dL 10.3* 11.4*   HEMATOCRIT % 34.6 37.4   PLATELETS 10*3/mm3 220 262         Results from last 7 days   Lab Units 07/23/24  0635   MAGNESIUM mg/dL 1.8           I personally viewed and interpreted the patient's EKG/Telemetry data     Assessment:  1.  Coronary artery disease / Elevated troponin level  2.  Paroxysmal atrial fibrillation  3.  Bowel obstruction  4.  Hypertension  5.  COPD    Plan:  1.  Coronary artery disease  --History of nonobstructive coronary artery disease based on remote coronary angiogram  -- Suspect manned ischemia contributing to elevated troponin level, with high suspicion for progression of coronary artery disease since remote coronary angiogram  -- Current symptoms appear to be primarily related to bowel obstruction, however cannot rule out a component of chronic angina  -- Recommended coronary angiogram if bowel obstruction continues to improve, however the patient states she is not currently interested in an invasive ischemic evaluation  -- Recommend the addition of aspirin 81 mg daily  -- Recommend increasing atorvastatin to 40 mg nightly  -- Change diltiazem to low-dose metoprolol for additional antianginal therapy  -- If additional episodes of  chest discomfort will uptitrate antianginal therapy, if symptoms unable to be adequately controlled the patient indicates she may be willing to then reconsider coronary angiogram  -- Will repeat echocardiogram to reassess LVEF          Thank you for allowing me to participate in the care of your patient. Please do not hesitate to contact me with additional questions or concerns.     VIDAL Curry MD  Interventional Cardiology   07/23/24  10:27 EDT

## 2024-07-23 NOTE — PROGRESS NOTES
LOS: 1 day   Patient Care Team:  Krystina Saunders DO as PCP - General (Family Medicine)  Sima Moses MD as Consulting Physician (General Surgery)  Taiwo Curry MD as Consulting Physician (Cardiology)       Chief Complaint: Diverticulitis, abdominal pain, partial obstruction    HPI: Patient seen and examined with daughter at bedside. She did get some rest last night. Patient reports that her abdominal pain is much better. She has continued to pass flatus. She has not had a bowel movement. Denies nausea and vomiting.    Vital Signs  Temp:  [97.6 °F (36.4 °C)-99.8 °F (37.7 °C)] 98.7 °F (37.1 °C)  Heart Rate:  [82-97] 97  Resp:  [16-20] 18  BP: (103-150)/(64-99) 120/64    Physical Exam:     General Appearance:  Alert and cooperative, not in any acute distress.   Respiratory/Lungs:   Breath sounds heard bilaterally equally.  No crackles or wheezing. No Pleural rub or bronchial breathing. Normal respiratory effort.    Cardiovascular/Heart:  Normal S1 and S2, no murmur. No edema   GI/Abdomen:   Soft, non-distended, minimally tender to palpation, non-peritoneal, active bowel sounds                Musculoskeletal/ Extremities:   Moves all extremities well   Skin: No bleeding, bruising or rash, no induration   Psychiatric : Alert and oriented ×3.  No depression or anxiety    Neurologic: Cranial nerves 2 - 12 grossly intact, sensation intact, Motor power is normal and equal bilaterally.     Results Review:       Lab Results (last 24 hours)       Procedure Component Value Units Date/Time    High Sensitivity Troponin T [[id number]]  (Abnormal) Collected: 07/23/24 0635    Specimen: Blood Updated: 07/23/24 0753     HS Troponin T 375 ng/L     Narrative:      High Sensitive Troponin T Reference Range:  <14.0 ng/L- Negative Female for AMI  <22.0 ng/L- Negative Male for AMI  >=14 - Abnormal Female indicating possible myocardial injury.  >=22 - Abnormal Male indicating possible myocardial injury.   Clinicians would  have to utilize clinical acumen, EKG, Troponin, and serial changes to determine if it is an Acute Myocardial Infarction or myocardial injury due to an underlying chronic condition.         Comprehensive Metabolic Panel [[id number]]  (Abnormal) Collected: 07/23/24 0635    Specimen: Blood Updated: 07/23/24 0741     Glucose 105 mg/dL      BUN 16 mg/dL      Creatinine 0.46 mg/dL      Sodium 145 mmol/L      Potassium 3.5 mmol/L      Chloride 101 mmol/L      CO2 31.3 mmol/L      Calcium 8.8 mg/dL      Total Protein 5.7 g/dL      Albumin 2.8 g/dL      ALT (SGPT) 20 U/L      AST (SGOT) 56 U/L      Alkaline Phosphatase 163 U/L      Total Bilirubin 0.2 mg/dL      Globulin 2.9 gm/dL      A/G Ratio 1.0 g/dL      BUN/Creatinine Ratio 34.8     Anion Gap 12.7 mmol/L      eGFR 98.7 mL/min/1.73     Narrative:      GFR Normal >60  Chronic Kidney Disease <60  Kidney Failure <15    The GFR formula is only valid for adults with stable renal function between ages 18 and 70.    Magnesium [[id number]]  (Normal) Collected: 07/23/24 0635    Specimen: Blood Updated: 07/23/24 0741     Magnesium 1.8 mg/dL     Phosphorus [[id number]]  (Normal) Collected: 07/23/24 0635    Specimen: Blood Updated: 07/23/24 0741     Phosphorus 3.3 mg/dL     CBC (No Diff) [[id number]]  (Abnormal) Collected: 07/23/24 0635    Specimen: Blood Updated: 07/23/24 0717     WBC 12.38 10*3/mm3      RBC 3.89 10*6/mm3      Hemoglobin 10.3 g/dL      Hematocrit 34.6 %      MCV 88.9 fL      MCH 26.5 pg      MCHC 29.8 g/dL      RDW 15.4 %      RDW-SD 49.5 fl      MPV 11.2 fL      Platelets 220 10*3/mm3     Blood Culture With LILIANA - Blood, Hand, Left [[id number]]  (Normal) Collected: 07/22/24 1337    Specimen: Blood from Hand, Left Updated: 07/23/24 0200     Blood Culture No growth at less than 24 hours    Blood Culture With LILIANA - Blood, Arm, Left [[id number]]  (Normal) Collected: 07/22/24 5379    Specimen: Blood from Arm, Left Updated: 07/23/24 0200     Blood Culture No growth at less  than 24 hours    [place] Draw [[id number]] Collected: 07/22/24 0929    Specimen: Blood Updated: 07/22/24 1601    Narrative:      The following orders were created for panel order [place] Draw.  Procedure                               Abnormality         Status                     ---------                               -----------         ------                     Green Top (Gel)[[id number]]                                  Final result               Lavender Top[[id number]]                                     Final result               Gold Top - SST[[id number]]                                                              Light Blue Top[[id number]]                                   Final result                 Please view results for these tests on the individual orders.    High Sensitivity Troponin T 2Hr [[id number]]  (Abnormal) Collected: 07/22/24 1124    Specimen: Blood from Arm, Right Updated: 07/22/24 1208     HS Troponin T 259 ng/L      Troponin T Delta -30 ng/L     Narrative:      High Sensitive Troponin T Reference Range:  <14.0 ng/L- Negative Female for AMI  <22.0 ng/L- Negative Male for AMI  >=14 - Abnormal Female indicating possible myocardial injury.  >=22 - Abnormal Male indicating possible myocardial injury.   Clinicians would have to utilize clinical acumen, EKG, Troponin, and serial changes to determine if it is an Acute Myocardial Infarction or myocardial injury due to an underlying chronic condition.         Lactic Acid, Plasma [[id number]]  (Normal) Collected: 07/22/24 0929    Specimen: Blood Updated: 07/22/24 1040     Lactate 1.2 mmol/L     Single High Sensitivity Troponin T [[id number]]  (Abnormal) Collected: 07/22/24 0929    Specimen: Blood Updated: 07/22/24 1017     HS Troponin T 289 ng/L     Narrative:      High Sensitive Troponin T Reference Range:  <14.0 ng/L- Negative Female for AMI  <22.0 ng/L- Negative Male for AMI  >=14 - Abnormal Female indicating possible myocardial injury.  >=22 - Abnormal  Male indicating possible myocardial injury.   Clinicians would have to utilize clinical acumen, EKG, Troponin, and serial changes to determine if it is an Acute Myocardial Infarction or myocardial injury due to an underlying chronic condition.         Comprehensive Metabolic Panel [[id number]]  (Abnormal) Collected: 07/22/24 0929    Specimen: Blood Updated: 07/22/24 1004     Glucose 140 mg/dL      BUN 17 mg/dL      Creatinine 0.65 mg/dL      Sodium 142 mmol/L      Potassium 3.5 mmol/L      Chloride 96 mmol/L      CO2 36.4 mmol/L      Calcium 8.7 mg/dL      Total Protein 6.1 g/dL      Albumin 3.4 g/dL      ALT (SGPT) 18 U/L      AST (SGOT) 37 U/L      Alkaline Phosphatase 192 U/L      Total Bilirubin 0.2 mg/dL      Globulin 2.7 gm/dL      A/G Ratio 1.3 g/dL      BUN/Creatinine Ratio 26.2     Anion Gap 9.6 mmol/L      eGFR 90.8 mL/min/1.73     Narrative:      GFR Normal >60  Chronic Kidney Disease <60  Kidney Failure <15    The GFR formula is only valid for adults with stable renal function between ages 18 and 70.    Lipase [[id number]]  (Abnormal) Collected: 07/22/24 0929    Specimen: Blood Updated: 07/22/24 1004     Lipase 10 U/L     Green Top (Gel) [[id number]] Collected: 07/22/24 0929    Specimen: Blood Updated: 07/22/24 0942     Extra Tube Hold for add-ons.     Comment: Auto resulted.       Lavender Top [[id number]] Collected: 07/22/24 0929    Specimen: Blood Updated: 07/22/24 0942     Extra Tube hold for add-on     Comment: Auto resulted       Light Blue Top [[id number]] Collected: 07/22/24 0929    Specimen: Blood Updated: 07/22/24 0942     Extra Tube Hold for add-ons.     Comment: Auto resulted       CBC & Differential [[id number]]  (Abnormal) Collected: 07/22/24 0929    Specimen: Blood Updated: 07/22/24 0941    Narrative:      The following orders were created for panel order CBC & Differential.  Procedure                               Abnormality         Status                     ---------                                -----------         ------                     CBC Auto Differential[[id number]]        Abnormal            Final result                 Please view results for these tests on the individual orders.    CBC Auto Differential [[id number]]  (Abnormal) Collected: 07/22/24 0929    Specimen: Blood Updated: 07/22/24 0941     WBC 13.72 10*3/mm3      RBC 4.26 10*6/mm3      Hemoglobin 11.4 g/dL      Hematocrit 37.4 %      MCV 87.8 fL      MCH 26.8 pg      MCHC 30.5 g/dL      RDW 15.0 %      RDW-SD 47.8 fl      MPV 10.4 fL      Platelets 262 10*3/mm3      Neutrophil % 88.7 %      Lymphocyte % 4.7 %      Monocyte % 5.9 %      Eosinophil % 0.0 %      Basophil % 0.1 %      Immature Grans % 0.6 %      Neutrophils, Absolute 12.17 10*3/mm3      Lymphocytes, Absolute 0.64 10*3/mm3      Monocytes, Absolute 0.81 10*3/mm3      Eosinophils, Absolute 0.00 10*3/mm3      Basophils, Absolute 0.02 10*3/mm3      Immature Grans, Absolute 0.08 10*3/mm3      nRBC 0.0 /100 WBC                 Assessment & Plan       Bowel obstruction    Coronary artery disease involving native coronary artery of native heart without angina pectoris    COPD (chronic obstructive pulmonary disease)    Impaired mobility and ADLs    Enteritis    Diverticulitis    Elevated troponin    Patient is a 79 year old female admitted to the hospital with sigmoid diverticulitis and concern for possible bowel obstruction. Her physical exam is much improved today and she only has mild tenderness to deep palpation of the abdomen. She continues to pass flatus and has bowel sounds. Leukocytosis improved to 12.38 today. Continue IV fluids and antibiotics. May advance to clear liquids later today if patient's abdominal exam continues to improve. Patient's troponin did increase today to 375 from 259. Awaiting cardiology evaluation. Your medical management.     Bianca Isaac DO  07/23/24  08:47 EDT

## 2024-07-24 ENCOUNTER — APPOINTMENT (OUTPATIENT)
Dept: CT IMAGING | Facility: HOSPITAL | Age: 79
End: 2024-07-24
Payer: MEDICARE

## 2024-07-24 LAB
ANION GAP SERPL CALCULATED.3IONS-SCNC: 8.6 MMOL/L (ref 5–15)
BACTERIA UR QL AUTO: ABNORMAL /HPF
BASOPHILS # BLD AUTO: 0.02 10*3/MM3 (ref 0–0.2)
BASOPHILS NFR BLD AUTO: 0.2 % (ref 0–1.5)
BILIRUB UR QL STRIP: ABNORMAL
BUN SERPL-MCNC: 18 MG/DL (ref 8–23)
BUN/CREAT SERPL: 31.6 (ref 7–25)
CALCIUM SPEC-SCNC: 8.9 MG/DL (ref 8.6–10.5)
CHLORIDE SERPL-SCNC: 100 MMOL/L (ref 98–107)
CLARITY UR: ABNORMAL
CO2 SERPL-SCNC: 32.4 MMOL/L (ref 22–29)
COLOR UR: ABNORMAL
CREAT SERPL-MCNC: 0.57 MG/DL (ref 0.57–1)
DEPRECATED RDW RBC AUTO: 49.7 FL (ref 37–54)
EGFRCR SERPLBLD CKD-EPI 2021: 93.7 ML/MIN/1.73
EOSINOPHIL # BLD AUTO: 0.02 10*3/MM3 (ref 0–0.4)
EOSINOPHIL NFR BLD AUTO: 0.2 % (ref 0.3–6.2)
ERYTHROCYTE [DISTWIDTH] IN BLOOD BY AUTOMATED COUNT: 15.4 % (ref 12.3–15.4)
GLUCOSE SERPL-MCNC: 130 MG/DL (ref 65–99)
GLUCOSE UR STRIP-MCNC: NEGATIVE MG/DL
HCT VFR BLD AUTO: 34.2 % (ref 34–46.6)
HGB BLD-MCNC: 10.2 G/DL (ref 12–15.9)
HGB UR QL STRIP.AUTO: NEGATIVE
HYALINE CASTS UR QL AUTO: ABNORMAL /LPF
HYPOCHROMIA BLD QL: NORMAL
IMM GRANULOCYTES # BLD AUTO: 0.06 10*3/MM3 (ref 0–0.05)
IMM GRANULOCYTES NFR BLD AUTO: 0.5 % (ref 0–0.5)
KETONES UR QL STRIP: ABNORMAL
LEUKOCYTE ESTERASE UR QL STRIP.AUTO: ABNORMAL
LYMPHOCYTES # BLD AUTO: 0.49 10*3/MM3 (ref 0.7–3.1)
LYMPHOCYTES NFR BLD AUTO: 3.8 % (ref 19.6–45.3)
MCH RBC QN AUTO: 26.4 PG (ref 26.6–33)
MCHC RBC AUTO-ENTMCNC: 29.8 G/DL (ref 31.5–35.7)
MCV RBC AUTO: 88.4 FL (ref 79–97)
MONOCYTES # BLD AUTO: 0.72 10*3/MM3 (ref 0.1–0.9)
MONOCYTES NFR BLD AUTO: 5.6 % (ref 5–12)
MUCOUS THREADS URNS QL MICRO: ABNORMAL /HPF
NEUTROPHILS NFR BLD AUTO: 11.54 10*3/MM3 (ref 1.7–7)
NEUTROPHILS NFR BLD AUTO: 89.7 % (ref 42.7–76)
NITRITE UR QL STRIP: POSITIVE
NRBC BLD AUTO-RTO: 0 /100 WBC (ref 0–0.2)
PH UR STRIP.AUTO: 6 [PH] (ref 5–8)
PLAT MORPH BLD: NORMAL
PLATELET # BLD AUTO: 257 10*3/MM3 (ref 140–450)
PMV BLD AUTO: 11 FL (ref 6–12)
POTASSIUM SERPL-SCNC: 3.3 MMOL/L (ref 3.5–5.2)
POTASSIUM SERPL-SCNC: 4 MMOL/L (ref 3.5–5.2)
PROT UR QL STRIP: ABNORMAL
RBC # BLD AUTO: 3.87 10*6/MM3 (ref 3.77–5.28)
RBC # UR STRIP: ABNORMAL /HPF
REF LAB TEST METHOD: ABNORMAL
SODIUM SERPL-SCNC: 141 MMOL/L (ref 136–145)
SP GR UR STRIP: 1.03 (ref 1–1.03)
SQUAMOUS #/AREA URNS HPF: ABNORMAL /HPF
STARCH GRANULES URNS QL MICRO: ABNORMAL /HPF
UROBILINOGEN UR QL STRIP: ABNORMAL
WBC # UR STRIP: ABNORMAL /HPF
WBC MORPH BLD: NORMAL
WBC NRBC COR # BLD AUTO: 12.85 10*3/MM3 (ref 3.4–10.8)

## 2024-07-24 PROCEDURE — 80048 BASIC METABOLIC PNL TOTAL CA: CPT | Performed by: NURSE PRACTITIONER

## 2024-07-24 PROCEDURE — 81001 URINALYSIS AUTO W/SCOPE: CPT | Performed by: INTERNAL MEDICINE

## 2024-07-24 PROCEDURE — 25010000002 POTASSIUM CHLORIDE 10 MEQ/100ML SOLUTION: Performed by: NURSE PRACTITIONER

## 2024-07-24 PROCEDURE — 94761 N-INVAS EAR/PLS OXIMETRY MLT: CPT

## 2024-07-24 PROCEDURE — 94664 DEMO&/EVAL PT USE INHALER: CPT

## 2024-07-24 PROCEDURE — 25010000002 CEFTRIAXONE PER 250 MG: Performed by: INTERNAL MEDICINE

## 2024-07-24 PROCEDURE — 99232 SBSQ HOSP IP/OBS MODERATE 35: CPT | Performed by: NURSE PRACTITIONER

## 2024-07-24 PROCEDURE — 94799 UNLISTED PULMONARY SVC/PX: CPT

## 2024-07-24 PROCEDURE — 25810000003 SODIUM CHLORIDE 0.9 % SOLUTION: Performed by: NURSE PRACTITIONER

## 2024-07-24 PROCEDURE — 99232 SBSQ HOSP IP/OBS MODERATE 35: CPT | Performed by: STUDENT IN AN ORGANIZED HEALTH CARE EDUCATION/TRAINING PROGRAM

## 2024-07-24 PROCEDURE — 74176 CT ABD & PELVIS W/O CONTRAST: CPT

## 2024-07-24 PROCEDURE — 85007 BL SMEAR W/DIFF WBC COUNT: CPT | Performed by: NURSE PRACTITIONER

## 2024-07-24 PROCEDURE — 87086 URINE CULTURE/COLONY COUNT: CPT | Performed by: NURSE PRACTITIONER

## 2024-07-24 PROCEDURE — 85025 COMPLETE CBC W/AUTO DIFF WBC: CPT | Performed by: NURSE PRACTITIONER

## 2024-07-24 PROCEDURE — 25010000002 ONDANSETRON PER 1 MG: Performed by: INTERNAL MEDICINE

## 2024-07-24 PROCEDURE — 25010000002 METRONIDAZOLE 500 MG/100ML SOLUTION: Performed by: INTERNAL MEDICINE

## 2024-07-24 PROCEDURE — 84132 ASSAY OF SERUM POTASSIUM: CPT | Performed by: NURSE PRACTITIONER

## 2024-07-24 PROCEDURE — 99233 SBSQ HOSP IP/OBS HIGH 50: CPT | Performed by: INTERNAL MEDICINE

## 2024-07-24 RX ORDER — POTASSIUM CHLORIDE 750 MG/1
40 CAPSULE, EXTENDED RELEASE ORAL ONCE
Qty: 4 CAPSULE | Refills: 0 | Status: COMPLETED | OUTPATIENT
Start: 2024-07-24 | End: 2024-07-24

## 2024-07-24 RX ORDER — POTASSIUM CHLORIDE 7.45 MG/ML
10 INJECTION INTRAVENOUS
Status: DISCONTINUED | OUTPATIENT
Start: 2024-07-24 | End: 2024-07-24

## 2024-07-24 RX ORDER — POTASSIUM CHLORIDE 750 MG/1
30 CAPSULE, EXTENDED RELEASE ORAL ONCE
Qty: 3 CAPSULE | Refills: 0 | Status: COMPLETED | OUTPATIENT
Start: 2024-07-24 | End: 2024-07-24

## 2024-07-24 RX ORDER — SODIUM CHLORIDE 9 MG/ML
75 INJECTION, SOLUTION INTRAVENOUS CONTINUOUS
Status: DISCONTINUED | OUTPATIENT
Start: 2024-07-24 | End: 2024-07-25

## 2024-07-24 RX ADMIN — POTASSIUM CHLORIDE 10 MEQ: 7.46 INJECTION, SOLUTION INTRAVENOUS at 12:08

## 2024-07-24 RX ADMIN — Medication 10 ML: at 20:44

## 2024-07-24 RX ADMIN — SENNOSIDES AND DOCUSATE SODIUM 2 TABLET: 50; 8.6 TABLET ORAL at 08:34

## 2024-07-24 RX ADMIN — CETIRIZINE HYDROCHLORIDE 10 MG: 10 TABLET, FILM COATED ORAL at 08:34

## 2024-07-24 RX ADMIN — IPRATROPIUM BROMIDE AND ALBUTEROL SULFATE 3 ML: .5; 3 SOLUTION RESPIRATORY (INHALATION) at 14:15

## 2024-07-24 RX ADMIN — QUETIAPINE FUMARATE 12.5 MG: 25 TABLET ORAL at 20:43

## 2024-07-24 RX ADMIN — POTASSIUM CHLORIDE 30 MEQ: 750 CAPSULE, EXTENDED RELEASE ORAL at 13:56

## 2024-07-24 RX ADMIN — METRONIDAZOLE 500 MG: 5 INJECTION, SOLUTION INTRAVENOUS at 18:35

## 2024-07-24 RX ADMIN — FERROUS SULFATE TAB EC 324 MG (65 MG FE EQUIVALENT) 324 MG: 324 (65 FE) TABLET DELAYED RESPONSE at 08:34

## 2024-07-24 RX ADMIN — CLONAZEPAM 1 MG: 0.5 TABLET ORAL at 17:49

## 2024-07-24 RX ADMIN — Medication 5 MG: at 20:43

## 2024-07-24 RX ADMIN — ATORVASTATIN CALCIUM 40 MG: 40 TABLET, FILM COATED ORAL at 08:35

## 2024-07-24 RX ADMIN — DULOXETINE HYDROCHLORIDE 60 MG: 30 CAPSULE, DELAYED RELEASE ORAL at 08:35

## 2024-07-24 RX ADMIN — BUDESONIDE 0.5 MG: 0.5 INHALANT RESPIRATORY (INHALATION) at 06:59

## 2024-07-24 RX ADMIN — BUDESONIDE 0.5 MG: 0.5 INHALANT RESPIRATORY (INHALATION) at 18:56

## 2024-07-24 RX ADMIN — POTASSIUM CHLORIDE 40 MEQ: 750 CAPSULE, EXTENDED RELEASE ORAL at 17:34

## 2024-07-24 RX ADMIN — CLOTRIMAZOLE AND BETAMETHASONE DIPROPIONATE 1 APPLICATION: 10; .5 CREAM TOPICAL at 20:53

## 2024-07-24 RX ADMIN — CEFTRIAXONE SODIUM 2000 MG: 2 INJECTION, POWDER, FOR SOLUTION INTRAMUSCULAR; INTRAVENOUS at 17:33

## 2024-07-24 RX ADMIN — METRONIDAZOLE 500 MG: 5 INJECTION, SOLUTION INTRAVENOUS at 08:37

## 2024-07-24 RX ADMIN — APIXABAN 5 MG: 5 TABLET, FILM COATED ORAL at 20:44

## 2024-07-24 RX ADMIN — IPRATROPIUM BROMIDE AND ALBUTEROL SULFATE 3 ML: .5; 3 SOLUTION RESPIRATORY (INHALATION) at 18:56

## 2024-07-24 RX ADMIN — OXYCODONE HYDROCHLORIDE AND ACETAMINOPHEN 1 TABLET: 7.5; 325 TABLET ORAL at 12:08

## 2024-07-24 RX ADMIN — Medication 10 ML: at 08:37

## 2024-07-24 RX ADMIN — Medication 5000 UNITS: at 08:35

## 2024-07-24 RX ADMIN — SERTRALINE 150 MG: 50 TABLET, FILM COATED ORAL at 08:36

## 2024-07-24 RX ADMIN — DULOXETINE HYDROCHLORIDE 60 MG: 30 CAPSULE, DELAYED RELEASE ORAL at 20:44

## 2024-07-24 RX ADMIN — IPRATROPIUM BROMIDE AND ALBUTEROL SULFATE 3 ML: .5; 3 SOLUTION RESPIRATORY (INHALATION) at 06:59

## 2024-07-24 RX ADMIN — SODIUM CHLORIDE 75 ML/HR: 9 INJECTION, SOLUTION INTRAVENOUS at 18:35

## 2024-07-24 RX ADMIN — OXYCODONE HYDROCHLORIDE AND ACETAMINOPHEN 1 TABLET: 7.5; 325 TABLET ORAL at 05:37

## 2024-07-24 RX ADMIN — APIXABAN 5 MG: 5 TABLET, FILM COATED ORAL at 08:34

## 2024-07-24 RX ADMIN — METRONIDAZOLE 500 MG: 5 INJECTION, SOLUTION INTRAVENOUS at 01:22

## 2024-07-24 RX ADMIN — OXYCODONE HYDROCHLORIDE AND ACETAMINOPHEN 1 TABLET: 7.5; 325 TABLET ORAL at 01:28

## 2024-07-24 RX ADMIN — SENNOSIDES AND DOCUSATE SODIUM 2 TABLET: 50; 8.6 TABLET ORAL at 20:43

## 2024-07-24 RX ADMIN — AMIODARONE HYDROCHLORIDE 200 MG: 200 TABLET ORAL at 08:45

## 2024-07-24 RX ADMIN — PANTOPRAZOLE SODIUM 40 MG: 40 TABLET, DELAYED RELEASE ORAL at 08:35

## 2024-07-24 RX ADMIN — CYCLOBENZAPRINE 5 MG: 10 TABLET, FILM COATED ORAL at 04:59

## 2024-07-24 RX ADMIN — FLUTICASONE PROPIONATE 2 SPRAY: 50 SPRAY, METERED NASAL at 08:37

## 2024-07-24 RX ADMIN — Medication 10 ML: at 04:59

## 2024-07-24 RX ADMIN — ONDANSETRON 4 MG: 2 INJECTION INTRAMUSCULAR; INTRAVENOUS at 04:59

## 2024-07-24 RX ADMIN — CLOTRIMAZOLE AND BETAMETHASONE DIPROPIONATE 1 APPLICATION: 10; .5 CREAM TOPICAL at 08:38

## 2024-07-24 NOTE — CASE MANAGEMENT/SOCIAL WORK
Case Management/Social Work    Patient Name:  Gabi Dudley  YOB: 1945  MRN: [id number]  Admit Date:  7/22/2024        10:31 EDT  Met with patient at bedside. Patient wants to discharge home with home health once medically ready. CM will continue to follow.      Electronically signed by:  Jesus Manuel Mathis RN  07/24/24 10:31 EDT

## 2024-07-24 NOTE — PROGRESS NOTES
LOS: 2 days   Patient Care Team:  Krystina Saunders DO as PCP - General (Family Medicine)  Sima Moses MD as Consulting Physician (General Surgery)  Taiwo Curry MD as Consulting Physician (Cardiology)       Chief Complaint: Diverticulitis, abdominal pain, partial obstruction    HPI: Patient seen and examined at bedside. No acute events overnight. Patient continues to pass flatus and did have a bowel movement yesterday. She is tolerating a clear liquid diet and denies nausea or vomiting. She states her abdominal pain is improved from presentation, mostly localized to left lower quadrant.     Vital Signs  Temp:  [98 °F (36.7 °C)-99 °F (37.2 °C)] 99 °F (37.2 °C)  Heart Rate:  [] 85  Resp:  [16-18] 18  BP: ()/(64-90) 119/68    Physical Exam:     General Appearance:  Alert and cooperative, not in any acute distress.   Respiratory/Lungs:   Breath sounds heard bilaterally equally.  No crackles or wheezing. No Pleural rub or bronchial breathing. Normal respiratory effort.    Cardiovascular/Heart:  Normal S1 and S2, no murmur. No edema   GI/Abdomen:   Soft, mildly distended, diastasis soft with no evidence of incarceration, tender to palpation in LLQ, non-peritoneal, bowel sounds present                Musculoskeletal/ Extremities:   Moves all extremities well   Skin: No bleeding, bruising or rash, no induration   Psychiatric : Alert and oriented ×3.  No depression or anxiety    Neurologic: Cranial nerves 2 - 12 grossly intact, sensation intact, Motor power is normal and equal bilaterally.     Results Review:       Lab Results (last 24 hours)       Procedure Component Value Units Date/Time    CBC & Differential [[id number]]  (Abnormal) Collected: 07/24/24 0839    Specimen: Blood Updated: 07/24/24 0913    Narrative:      The following orders were created for panel order CBC & Differential.  Procedure                               Abnormality         Status                     ---------                                -----------         ------                     CBC Auto Differential[[id number]]        Abnormal            Final result               Scan Slide[[id number]]                                       Final result                 Please view results for these tests on the individual orders.    Scan Slide [[id number]] Collected: 07/24/24 0839    Specimen: Blood Updated: 07/24/24 0913     Hypochromia Slight/1+     WBC Morphology Normal     Platelet Morphology Normal    Basic Metabolic Panel [[id number]]  (Abnormal) Collected: 07/24/24 0839    Specimen: Blood Updated: 07/24/24 0909     Glucose 130 mg/dL      BUN 18 mg/dL      Creatinine 0.57 mg/dL      Sodium 141 mmol/L      Potassium 3.3 mmol/L      Chloride 100 mmol/L      CO2 32.4 mmol/L      Calcium 8.9 mg/dL      BUN/Creatinine Ratio 31.6     Anion Gap 8.6 mmol/L      eGFR 93.7 mL/min/1.73     Narrative:      GFR Normal >60  Chronic Kidney Disease <60  Kidney Failure <15    The GFR formula is only valid for adults with stable renal function between ages 18 and 70.    CBC Auto Differential [[id number]]  (Abnormal) Collected: 07/24/24 0839    Specimen: Blood Updated: 07/24/24 0850     WBC 12.85 10*3/mm3      RBC 3.87 10*6/mm3      Hemoglobin 10.2 g/dL      Hematocrit 34.2 %      MCV 88.4 fL      MCH 26.4 pg      MCHC 29.8 g/dL      RDW 15.4 %      RDW-SD 49.7 fl      MPV 11.0 fL      Platelets 257 10*3/mm3      Neutrophil % 89.7 %      Lymphocyte % 3.8 %      Monocyte % 5.6 %      Eosinophil % 0.2 %      Basophil % 0.2 %      Immature Grans % 0.5 %      Neutrophils, Absolute 11.54 10*3/mm3      Lymphocytes, Absolute 0.49 10*3/mm3      Monocytes, Absolute 0.72 10*3/mm3      Eosinophils, Absolute 0.02 10*3/mm3      Basophils, Absolute 0.02 10*3/mm3      Immature Grans, Absolute 0.06 10*3/mm3      nRBC 0.0 /100 WBC     Blood Culture With LILIANA - Blood, Hand, Left [[id number]]  (Normal) Collected: 07/22/24 1337    Specimen: Blood from Hand, Left Updated: 07/23/24  1400     Blood Culture No growth at 24 hours    Blood Culture With LILIANA - Blood, Arm, Left [[id number]]  (Normal) Collected: 07/22/24 1337    Specimen: Blood from Arm, Left Updated: 07/23/24 1400     Blood Culture No growth at 24 hours                Assessment & Plan       Bowel obstruction    Coronary artery disease involving native coronary artery of native heart without angina pectoris    COPD (chronic obstructive pulmonary disease)    Impaired mobility and ADLs    Enteritis    Diverticulitis    Elevated troponin    Patient is a 79 year old female admitted to the hospital with sigmoid diverticulitis and concern for possible bowel obstruction. Continues to have some abdominal pain, repeat CT abdomen ordered by primary and pending. Patient is passing flatus and having bowel movements. She is tolerating a clear liquid diet without nausea or vomiting. No indication for surgical intervention. Continue current care plan.     Bianca Isaac DO  07/24/24  12:35 EDT

## 2024-07-24 NOTE — PROGRESS NOTES
[facility] HOSPITALIST    PROGRESS NOTE    Name:  Gabi Dudley   Age:  79 y.o.  Sex:  female  :  1945  MRN:  [id number]   Visit Number:  [id number]  Admission Date:  2024  Date Of Service:  24  Primary Care Physician:  Krystina Saunders DO     LOS: 2 days :    Chief Complaint:      Abdominal pain    Subjective:    Patient seen and examined.  States she is aggravated this morning.  Is having gas pains.  Feels better after she passes gas.  Denies further chest pain.  Did not sleep well.  Denies rehab at this point.  However, states that she does not want home health either.  Not sure what she is going to do at this time.    Hospital Course:    Ms. Dudley is a pleasant 79-year-old female with pertinent past medical history of atrial fibrillation on Eliquis, anemia, depression, coronary artery disease, COPD on 4 L of oxygen at baseline, gastritis, GERD, prior bowel obstruction status post adhesiolysis with bowel resection and reduction of volvulus in  per Dr. Isaac.  Patient presented to emergency department due to abdominal pain, nausea, vomiting, and diarrhea.  Onset 2 weeks ago with worsening symptoms noted.  Patient currently lives with her daughter.  Had noticed prior bowel movement hard.  Denied fever.  No recent colonoscopy.    Upon ED presentation patient hemodynamically stable.  Pertinent labs and imaging included HS trop 289 and then 259, WBC 13.72, CT abdomen enteritis/diverticultis, mild obstruction, worsened L3 compression fracture.  General surgeon Dr. Isaac consulted.  Hospitalist consulted for further medical management.  Patient continued on Rocephin and Flagyl with severe pain noted in left lower quadrant likely diverticulitis.  Patient continued with supportive IV fluids/Zofran/pain control.  Troponin was noted to increase to 375 with a.m. labs.  Cardiology consulted.  Patient with intermittent chest pain noted over the past several weeks.  Cardiology  suggested coronary angiogram with patient refusing at this time.  Cardiology recommended aspirin 81 mg daily, atorvastatin 40 mg at bedtime, transition diltiazem to low-dose metoprolol with echo pending.  Unfortunately echo noted EF 36 to 40% with multiple regional wall motion abnormalities.  Patient noted to have worsening abdominal pain with repeat CT ordered.    Review of Systems:     All systems were reviewed and negative except as mentioned in subjective, assessment and plan.    Vital Signs:    Temp:  [98 °F (36.7 °C)-99 °F (37.2 °C)] 99 °F (37.2 °C)  Heart Rate:  [] 85  Resp:  [16-18] 18  BP: ()/(64-90) 119/68    Intake and output:    I/O last 3 completed shifts:  In: 540 [P.O.:540]  Out: -   I/O this shift:  In: 120 [P.O.:120]  Out: 200 [Urine:200]    Physical Examination:    General Appearance:  Alert and cooperative.  Chronically ill middle-aged female.  Agitated.   Head:  Atraumatic and normocephalic.   Eyes: Conjunctivae and sclerae normal, no icterus. No pallor.   Throat: No oral lesions, no thrush, oral mucosa moist.   Neck: Supple, trachea midline, no thyromegaly.   Lungs:   Breath sounds heard bilaterally equally.  No wheezing or crackles. No Pleural rub or bronchial breathing.  On 4 L nasal cannula unlabored.   Heart:  Normal S1 and S2, no murmur, no gallop, no rub. No JVD.   Abdomen:   Normal bowel sounds, no masses, no organomegaly. Soft, left lower quadrant tenderness with palpation noted, nondistended, no rebound tenderness.   Extremities: Supple, no edema, no cyanosis, no clubbing.   Skin: No bleeding or rash.  Scattered ecchymosis bilateral lower extremities.   Neurologic: Alert and oriented x 3. No facial asymmetry. Moves all four limbs. No tremors.  Generalized weakness.     Laboratory results:    Results from last 7 days   Lab Units 07/24/24  0839 07/23/24  0635 07/22/24  0929   SODIUM mmol/L 141 145 142   POTASSIUM mmol/L 3.3* 3.5 3.5   CHLORIDE mmol/L 100 101 96*   CO2 mmol/L  32.4* 31.3* 36.4*   BUN mg/dL 18 16 17   CREATININE mg/dL 0.57 0.46* 0.65   CALCIUM mg/dL 8.9 8.8 8.7   BILIRUBIN mg/dL  --  0.2 0.2   ALK PHOS U/L  --  163* 192*   ALT (SGPT) U/L  --  20 18   AST (SGOT) U/L  --  56* 37*   GLUCOSE mg/dL 130* 105* 140*     Results from last 7 days   Lab Units 07/24/24  0839 07/23/24  0635 07/22/24  0929   WBC 10*3/mm3 12.85* 12.38* 13.72*   HEMOGLOBIN g/dL 10.2* 10.3* 11.4*   HEMATOCRIT % 34.2 34.6 37.4   PLATELETS 10*3/mm3 257 220 262         Results from last 7 days   Lab Units 07/23/24  0635 07/22/24  1124 07/22/24  0929   HSTROP T ng/L 375* 259* 289*     Results from last 7 days   Lab Units 07/22/24  1337   BLOODCX  No growth at 24 hours  No growth at 24 hours     Recent Labs     08/15/23  0715 08/16/23  0731 08/17/23  0958   PHART 7.364 7.358 7.452*   DXW5KQD 55.3* 65.5* 55.6*   PO2ART 306.0* 70.7* 88.0   COC0ETW 31.5* 36.8* 38.8*   BASEEXCESS 5.0* 9.6* 13.1*      I have reviewed the patient's laboratory results.    Radiology results:    Adult Transthoracic Echo Limited W/ Cont if Necessary Per Protocol    Result Date: 7/23/2024    Left ventricular systolic function is mildly to moderately decreased. Left ventricular ejection fraction appears to be 36 - 40%.   Regional wall motion abnormalities as below.   No left ventricular thrombus identified by contrasted study.     Adult Transthoracic Echo Complete W/ Cont if Necessary Per Protocol    Result Date: 7/23/2024    Left ventricular systolic function is mildly decreased. Calculated left ventricular EF = 41.1% Left ventricular ejection fraction appears to be 41 - 45%.  Grade I diastolic dysfunction present.   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  No obvious large mass or thrombus was noted but small thrombus cannot be definitively excluded.  Recommend repeat limited echo with contrast for further evaluation.   Right ventricle of normal size with borderline systolic function.   Mild mitral valve regurgitation is  present with an anteriorly-directed jet noted.   A prominent Chiari network may be present in the right atrium.    I have reviewed the patient's radiology reports.    Medication Review:     I have reviewed the patient's active and prn medications.     Problem List:      Bowel obstruction    Coronary artery disease involving native coronary artery of native heart without angina pectoris    COPD (chronic obstructive pulmonary disease)    Impaired mobility and ADLs    Enteritis    Diverticulitis    Elevated troponin      Assessment:    Left lower quadrant pain, acute diverticulitis, POA  Concern for possible partial bowel obstruction, POA  Elevated troponin, POA  Cardiomyopathy  Coronary artery disease  COPD on 4 L  Compression fracture L3  Atrial fibrillation on Eliquis    Plan:    Left lower quadrant pain/diverticulitis/questionable partial bowel obstruction  -Continue Rocephin and Flagyl for left lower quadrant pain likely acute diverticulitis  -General surgeon Dr. Isaac following.  -Diet advanced to clears.  However, patient having worsening pain this morning for which she associates with flatus.  Repeat CT of abdomen pelvis pending.    Elevated troponin/cardiomyopathy  -Cardiology following.  No current chest pain.  -Diltiazem discontinued and metoprolol added  -Atorvastatin increased.  -Will add aspirin upon discharge.  -Cardiomyopathy noted with a EF 36 to 40% with echo with multiple regional wall motion abnormalities.  Could be stress-induced.  Cardiology continuing to follow.  Appreciate further recommendations.    -Continue home medications as appropriate.  -Patient does have follow-up with Dr. Kapadia on Thursday.  Updated patient currently admitted.  -Discussed with daughter Farida who thinks that patient would be agreeable to swing bed.  She had a good experience at [facility] in the past.  -PT/OT evaluations pending.    I have reviewed the copied text and it is accurate as of 7/24/2024     DVT  Prophylaxis: Eliquis  Code Status: Full code  Diet: Clears  Discharge Plan: TBD    Britt Nascimento, APRN  07/24/24  12:09 EDT    Dictated utilizing Dragon dictation.

## 2024-07-24 NOTE — PLAN OF CARE
Problem: Adjustment to Illness (Sepsis/Septic Shock)  Goal: Optimal Coping  7/24/2024 0414 by Aleksey Solo RN  Outcome: Ongoing, Progressing  7/24/2024 0414 by Aleksey Solo RN  Outcome: Ongoing, Progressing  Intervention: Optimize Psychosocial Adjustment to Illness  Recent Flowsheet Documentation  Taken 7/23/2024 2000 by Aleksey Solo RN  Family/Support System Care: caregiver stress acknowledged     Problem: Bleeding (Sepsis/Septic Shock)  Goal: Absence of Bleeding  7/24/2024 0414 by Aleksey Solo RN  Outcome: Ongoing, Progressing  7/24/2024 0414 by Aleksey Solo RN  Outcome: Ongoing, Progressing     Problem: Glycemic Control Impaired (Sepsis/Septic Shock)  Goal: Blood Glucose Level Within Desired Range  7/24/2024 0414 by Aleksey Solo RN  Outcome: Ongoing, Progressing  7/24/2024 0414 by Aleksey Solo RN  Outcome: Ongoing, Progressing     Problem: Infection Progression (Sepsis/Septic Shock)  Goal: Absence of Infection Signs and Symptoms  7/24/2024 0414 by Aleksey Solo RN  Outcome: Ongoing, Progressing  7/24/2024 0414 by Aleksey Solo RN  Outcome: Ongoing, Progressing  Intervention: Initiate Sepsis Management  Recent Flowsheet Documentation  Taken 7/24/2024 0400 by Aleksey Solo RN  Infection Prevention:   rest/sleep promoted   single patient room provided   hand hygiene promoted   environmental surveillance performed  Taken 7/24/2024 0200 by Aleksey Solo RN  Infection Prevention:   rest/sleep promoted   single patient room provided   equipment surfaces disinfected   environmental surveillance performed   hand hygiene promoted  Taken 7/24/2024 0137 by Aleksey Solo RN  Infection Prevention:   rest/sleep promoted   single patient room provided   equipment surfaces disinfected   environmental surveillance performed  Taken 7/24/2024 0000 by Aleksey Solo RN  Infection Prevention:   single patient room provided   rest/sleep promoted   hand hygiene promoted   environmental surveillance performed  Taken  7/23/2024 2200 by Aleksey Solo RN  Infection Prevention:   hand hygiene promoted   rest/sleep promoted   single patient room provided  Isolation Precautions: precautions maintained  Taken 7/23/2024 2000 by Aleksey Solo RN  Infection Prevention:   single patient room provided   rest/sleep promoted   hand hygiene promoted  Intervention: Promote Recovery  Recent Flowsheet Documentation  Taken 7/24/2024 0400 by Aleksey Solo RN  Activity Management: activity minimized  Sleep/Rest Enhancement: room darkened  Taken 7/24/2024 0200 by Aleksey Solo RN  Activity Management: activity minimized  Taken 7/24/2024 0137 by Aleksey Solo RN  Activity Management:   up to bedside commode   back to bed  Taken 7/24/2024 0000 by Aleksey Solo RN  Activity Management: activity minimized  Sleep/Rest Enhancement: room darkened  Taken 7/23/2024 2200 by Aleksey Solo RN  Activity Management: activity minimized  Taken 7/23/2024 2105 by Aleksey Solo RN  Activity Management: activity minimized  Taken 7/23/2024 2000 by Aleksey Solo RN  Activity Management: activity encouraged  Intervention: Promote Stabilization  Recent Flowsheet Documentation  Taken 7/24/2024 0400 by Aleksey Solo RN  Fluid/Electrolyte Management: fluids provided  Taken 7/24/2024 0000 by Aleksey Solo RN  Fluid/Electrolyte Management: fluids provided  Taken 7/23/2024 2200 by Aleksey Solo RN  Fluid/Electrolyte Management: fluids provided     Problem: Nutrition Impaired (Sepsis/Septic Shock)  Goal: Optimal Nutrition Intake  7/24/2024 0414 by Aleksey Solo RN  Outcome: Ongoing, Progressing  7/24/2024 0414 by Aleksey Solo RN  Outcome: Ongoing, Progressing     Problem: Adult Inpatient Plan of Care  Goal: Plan of Care Review  7/24/2024 0414 by Aleksey Solo RN  Outcome: Ongoing, Progressing  Flowsheets  Taken 7/24/2024 0414  Plan of Care Reviewed With:   patient   daughter  Outcome Evaluation: pt VSS. pt diet adavanced to clear liquids. pt restless part of night.  pt up to BSC a few times. daughter at bedside. will continue to monitor.  Taken 7/23/2024 0522  Progress: improving  7/24/2024 0414 by Aleksey Solo RN  Outcome: Ongoing, Progressing  Flowsheets (Taken 7/24/2024 0414)  Plan of Care Reviewed With:   patient   daughter  Outcome Evaluation: pt VSS. pt diet adavanced to clear liquids. pt restless part of night. pt up to BSC a few times. daughter at bedside. will continue to monitor.  Goal: Patient-Specific Goal (Individualized)  7/24/2024 0414 by Aleksey Solo RN  Outcome: Ongoing, Progressing  7/24/2024 0414 by Aleksey Solo RN  Outcome: Ongoing, Progressing  Goal: Absence of Hospital-Acquired Illness or Injury  7/24/2024 0414 by Aleksey Solo RN  Outcome: Ongoing, Progressing  7/24/2024 0414 by Aleksey Solo RN  Outcome: Ongoing, Progressing  Intervention: Identify and Manage Fall Risk  Recent Flowsheet Documentation  Taken 7/24/2024 0400 by Aleksey Solo RN  Safety Promotion/Fall Prevention:   activity supervised   assistive device/personal items within reach   room organization consistent   safety round/check completed  Taken 7/24/2024 0200 by Aleksey Solo RN  Safety Promotion/Fall Prevention: safety round/check completed  Taken 7/24/2024 0137 by Aleksey Solo RN  Safety Promotion/Fall Prevention:   activity supervised   safety round/check completed  Taken 7/24/2024 0000 by Aleksey Solo RN  Safety Promotion/Fall Prevention: safety round/check completed  Taken 7/23/2024 2200 by Aleksey Solo RN  Safety Promotion/Fall Prevention: safety round/check completed  Taken 7/23/2024 2000 by Aleksey Solo RN  Safety Promotion/Fall Prevention:   activity supervised   assistive device/personal items within reach   fall prevention program maintained   lighting adjusted   room organization consistent   safety round/check completed  Intervention: Prevent Skin Injury  Recent Flowsheet Documentation  Taken 7/24/2024 0400 by Aleksey Solo RN  Body Position: supine, legs  elevated  Taken 7/24/2024 0200 by Aleksey Solo RN  Body Position:   turned   right  Taken 7/24/2024 0137 by Aleksey Solo RN  Body Position: side-lying  Taken 7/24/2024 0000 by Aleksey Solo RN  Body Position: supine, legs elevated  Taken 7/23/2024 2200 by Aleksey Solo RN  Body Position:   turned   right   legs elevated  Taken 7/23/2024 2105 by Aleksey Solo RN  Body Position:   turned   right   legs elevated  Taken 7/23/2024 2000 by Aleksey Solo RN  Body Position:   turned   left  Skin Protection:   adhesive use limited   tubing/devices free from skin contact   skin-to-device areas padded   skin-to-skin areas padded  Intervention: Prevent and Manage VTE (Venous Thromboembolism) Risk  Recent Flowsheet Documentation  Taken 7/24/2024 0400 by Aleksey Solo RN  Activity Management: activity minimized  Taken 7/24/2024 0200 by Aleksey Solo RN  Activity Management: activity minimized  Taken 7/24/2024 0137 by Aleksey Solo RN  Activity Management:   up to bedside commode   back to bed  Taken 7/24/2024 0000 by Aleksey Solo RN  Activity Management: activity minimized  Taken 7/23/2024 2200 by Aleksey Solo RN  Activity Management: activity minimized  Taken 7/23/2024 2105 by Aleksey Solo RN  Activity Management: activity minimized  Taken 7/23/2024 2000 by Aleksey Solo RN  Activity Management: activity encouraged  Range of Motion: active ROM (range of motion) encouraged  Intervention: Prevent Infection  Recent Flowsheet Documentation  Taken 7/24/2024 0400 by Aleksey Solo RN  Infection Prevention:   rest/sleep promoted   single patient room provided   hand hygiene promoted   environmental surveillance performed  Taken 7/24/2024 0200 by Aleksey Solo RN  Infection Prevention:   rest/sleep promoted   single patient room provided   equipment surfaces disinfected   environmental surveillance performed   hand hygiene promoted  Taken 7/24/2024 0137 by Aleksey Solo RN  Infection Prevention:   rest/sleep  promoted   single patient room provided   equipment surfaces disinfected   environmental surveillance performed  Taken 7/24/2024 0000 by Aleksey Solo RN  Infection Prevention:   single patient room provided   rest/sleep promoted   hand hygiene promoted   environmental surveillance performed  Taken 7/23/2024 2200 by Aleksey Solo RN  Infection Prevention:   hand hygiene promoted   rest/sleep promoted   single patient room provided  Taken 7/23/2024 2000 by Aleksey Solo RN  Infection Prevention:   single patient room provided   rest/sleep promoted   hand hygiene promoted  Goal: Optimal Comfort and Wellbeing  7/24/2024 0414 by Aleksey Solo RN  Outcome: Ongoing, Progressing  7/24/2024 0414 by Aleksey Solo RN  Outcome: Ongoing, Progressing  Intervention: Monitor Pain and Promote Comfort  Recent Flowsheet Documentation  Taken 7/24/2024 0128 by Aleksey Solo RN  Pain Management Interventions:   position adjusted   see MAR  Taken 7/23/2024 2001 by Aleksey Solo RN  Pain Management Interventions:   see MAR   pillow support provided   position adjusted  Intervention: Provide Person-Centered Care  Recent Flowsheet Documentation  Taken 7/23/2024 2000 by Aleksey Solo RN  Trust Relationship/Rapport:   care explained   questions answered   questions encouraged   reassurance provided   thoughts/feelings acknowledged  Goal: Readiness for Transition of Care  7/24/2024 0414 by Aleksey Solo RN  Outcome: Ongoing, Progressing  7/24/2024 0414 by Aleksey Solo RN  Outcome: Ongoing, Progressing     Problem: Skin Injury Risk Increased  Goal: Skin Health and Integrity  7/24/2024 0414 by Aleksey Solo RN  Outcome: Ongoing, Progressing  7/24/2024 0414 by Aleksey Solo RN  Outcome: Ongoing, Progressing  Intervention: Optimize Skin Protection  Recent Flowsheet Documentation  Taken 7/24/2024 0400 by Aleksey Solo RN  Head of Bed (HOB) Positioning: HOB lowered  Taken 7/24/2024 0200 by Aleksey Solo RN  Head of Bed (HOB)  Positioning: HOB lowered  Taken 7/24/2024 0137 by Aleksey Solo RN  Head of Bed (HOB) Positioning: HOB lowered  Taken 7/24/2024 0000 by Aleksey Solo RN  Head of Bed (HOB) Positioning: HOB lowered  Taken 7/23/2024 2200 by Aleksey Solo RN  Head of Bed (HOB) Positioning: HOB lowered  Taken 7/23/2024 2105 by Aleksey Solo RN  Head of Bed (HOB) Positioning: HOB elevated  Taken 7/23/2024 2000 by Aleksey Solo RN  Pressure Reduction Techniques: weight shift assistance provided  Head of Bed (HOB) Positioning: HOB elevated  Skin Protection:   adhesive use limited   tubing/devices free from skin contact   skin-to-device areas padded   skin-to-skin areas padded     Problem: Fall Injury Risk  Goal: Absence of Fall and Fall-Related Injury  7/24/2024 0414 by Aleksey Solo RN  Outcome: Ongoing, Progressing  7/24/2024 0414 by Aleksey Solo RN  Outcome: Ongoing, Progressing  Intervention: Promote Injury-Free Environment  Recent Flowsheet Documentation  Taken 7/24/2024 0400 by Aleksey Solo RN  Safety Promotion/Fall Prevention:   activity supervised   assistive device/personal items within reach   room organization consistent   safety round/check completed  Taken 7/24/2024 0200 by Aleksey Solo RN  Safety Promotion/Fall Prevention: safety round/check completed  Taken 7/24/2024 0137 by Aleksey Solo RN  Safety Promotion/Fall Prevention:   activity supervised   safety round/check completed  Taken 7/24/2024 0000 by Aleksey Solo RN  Safety Promotion/Fall Prevention: safety round/check completed  Taken 7/23/2024 2200 by Aleksey Solo RN  Safety Promotion/Fall Prevention: safety round/check completed  Taken 7/23/2024 2000 by Aleksey Solo RN  Safety Promotion/Fall Prevention:   activity supervised   assistive device/personal items within reach   fall prevention program maintained   lighting adjusted   room organization consistent   safety round/check completed   Goal Outcome Evaluation:  Plan of Care Reviewed With: patient,  daughter        Progress: improving  Outcome Evaluation: VSS. pt able to rest for a few hours through night. pt up to BSC x1 assist. pt c/o abdominal pain following movement. PRN given. will continue to monitor.

## 2024-07-24 NOTE — PLAN OF CARE
Goal Outcome Evaluation:  Plan of Care Reviewed With: patient       Problem: Adjustment to Illness (Sepsis/Septic Shock)  Goal: Optimal Coping  Outcome: Ongoing, Progressing  Intervention: Optimize Psychosocial Adjustment to Illness  Recent Flowsheet Documentation  Taken 7/24/2024 0830 by Mell Wood RN  Family/Support System Care:   support provided   self-care encouraged     Problem: Bleeding (Sepsis/Septic Shock)  Goal: Absence of Bleeding  Outcome: Ongoing, Progressing     Problem: Glycemic Control Impaired (Sepsis/Septic Shock)  Goal: Blood Glucose Level Within Desired Range  Outcome: Ongoing, Progressing     Problem: Infection Progression (Sepsis/Septic Shock)  Goal: Absence of Infection Signs and Symptoms  Outcome: Ongoing, Progressing  Intervention: Initiate Sepsis Management  Recent Flowsheet Documentation  Taken 7/24/2024 1800 by Mell Wood RN  Infection Prevention:   single patient room provided   rest/sleep promoted   equipment surfaces disinfected   hand hygiene promoted   environmental surveillance performed  Taken 7/24/2024 1600 by Mell Wood RN  Infection Prevention:   single patient room provided   rest/sleep promoted   hand hygiene promoted   equipment surfaces disinfected   environmental surveillance performed  Taken 7/24/2024 1417 by Mell Wood RN  Infection Prevention:   single patient room provided   rest/sleep promoted   hand hygiene promoted   equipment surfaces disinfected   environmental surveillance performed  Taken 7/24/2024 1200 by Mell Wood RN  Infection Prevention:   single patient room provided   rest/sleep promoted   hand hygiene promoted   equipment surfaces disinfected   environmental surveillance performed  Taken 7/24/2024 1000 by Mell Wood RN  Infection Prevention:   single patient room provided   rest/sleep promoted   hand hygiene promoted   equipment surfaces disinfected   environmental surveillance performed  Taken 7/24/2024 0830 by Mell Wood  RN  Infection Prevention:   single patient room provided   rest/sleep promoted   hand hygiene promoted   equipment surfaces disinfected   environmental surveillance performed  Intervention: Promote Recovery  Recent Flowsheet Documentation  Taken 7/24/2024 1800 by Mell Wood RN  Activity Management:   activity encouraged   ambulated in room   ambulated to bathroom  Taken 7/24/2024 1600 by Mell Wood RN  Activity Management: activity encouraged  Taken 7/24/2024 1417 by Mell Wood RN  Activity Management: activity encouraged  Taken 7/24/2024 1200 by Mell Wood RN  Activity Management: activity encouraged  Taken 7/24/2024 1000 by Mell Wood RN  Activity Management: activity encouraged  Taken 7/24/2024 0830 by Mell Wood RN  Activity Management:   activity encouraged   ambulated in room   ambulated to bathroom     Problem: Nutrition Impaired (Sepsis/Septic Shock)  Goal: Optimal Nutrition Intake  Outcome: Ongoing, Progressing     Problem: Adult Inpatient Plan of Care  Goal: Plan of Care Review  Outcome: Ongoing, Progressing  Flowsheets (Taken 7/24/2024 1847)  Plan of Care Reviewed With: patient  Goal: Patient-Specific Goal (Individualized)  Outcome: Ongoing, Progressing  Goal: Absence of Hospital-Acquired Illness or Injury  Outcome: Ongoing, Progressing  Intervention: Identify and Manage Fall Risk  Recent Flowsheet Documentation  Taken 7/24/2024 1800 by Mell Wood RN  Safety Promotion/Fall Prevention:   activity supervised   assistive device/personal items within reach   clutter free environment maintained   toileting scheduled   safety round/check completed   room organization consistent   fall prevention program maintained   lighting adjusted   nonskid shoes/slippers when out of bed  Taken 7/24/2024 1600 by Mell Wood RN  Safety Promotion/Fall Prevention:   activity supervised   assistive device/personal items within reach   clutter free environment maintained   toileting scheduled    safety round/check completed   room organization consistent   fall prevention program maintained   lighting adjusted   nonskid shoes/slippers when out of bed  Taken 7/24/2024 1417 by Mell Wood RN  Safety Promotion/Fall Prevention:   activity supervised   assistive device/personal items within reach   clutter free environment maintained   safety round/check completed   toileting scheduled   room organization consistent   fall prevention program maintained   lighting adjusted   nonskid shoes/slippers when out of bed  Taken 7/24/2024 1200 by Mell Wood RN  Safety Promotion/Fall Prevention:   activity supervised   assistive device/personal items within reach   clutter free environment maintained   toileting scheduled   safety round/check completed   room organization consistent   fall prevention program maintained   lighting adjusted   nonskid shoes/slippers when out of bed  Taken 7/24/2024 1000 by Mell Wood RN  Safety Promotion/Fall Prevention:   activity supervised   assistive device/personal items within reach   clutter free environment maintained   toileting scheduled   safety round/check completed   room organization consistent   fall prevention program maintained   lighting adjusted   nonskid shoes/slippers when out of bed  Taken 7/24/2024 0830 by Mell Wood RN  Safety Promotion/Fall Prevention:   activity supervised   clutter free environment maintained   assistive device/personal items within reach   toileting scheduled   safety round/check completed   room organization consistent   fall prevention program maintained   lighting adjusted   nonskid shoes/slippers when out of bed  Intervention: Prevent Skin Injury  Recent Flowsheet Documentation  Taken 7/24/2024 1800 by Mell Wood RN  Skin Protection:   adhesive use limited   tubing/devices free from skin contact   skin-to-skin areas padded   skin-to-device areas padded  Taken 7/24/2024 1600 by Mell Wood RN  Body Position: (pt refused to  turn in bed.)   turned   right   position changed independently  Taken 7/24/2024 1417 by eMll Wood RN  Body Position:   sitting up in bed   position changed independently  Taken 7/24/2024 1200 by Mell Wood RN  Body Position:   tilted   right   position changed independently  Taken 7/24/2024 1000 by Mell Wood RN  Body Position:   turned   right   position changed independently  Skin Protection:   adhesive use limited   tubing/devices free from skin contact   skin-to-skin areas padded   skin-to-device areas padded  Taken 7/24/2024 0830 by Mell Wood RN  Body Position:   position changed independently   tilted   left  Skin Protection:   adhesive use limited   tubing/devices free from skin contact   skin-to-skin areas padded   skin-to-device areas padded   incontinence pads utilized  Intervention: Prevent and Manage VTE (Venous Thromboembolism) Risk  Recent Flowsheet Documentation  Taken 7/24/2024 1800 by Mell Wood RN  Activity Management:   activity encouraged   ambulated in room   ambulated to bathroom  Taken 7/24/2024 1600 by Mell Wood RN  Activity Management: activity encouraged  Taken 7/24/2024 1417 by Mell Wood RN  Activity Management: activity encouraged  Taken 7/24/2024 1200 by Mell Wood RN  Activity Management: activity encouraged  Taken 7/24/2024 1000 by Mell Wood RN  Activity Management: activity encouraged  Taken 7/24/2024 0830 by Mell Wood RN  Activity Management:   activity encouraged   ambulated in room   ambulated to bathroom  VTE Prevention/Management: (See MAR: eliquis)   bilateral   sequential compression devices off   patient refused intervention   other (see comments)  Range of Motion: active ROM (range of motion) encouraged  Intervention: Prevent Infection  Recent Flowsheet Documentation  Taken 7/24/2024 1800 by Mell Wood RN  Infection Prevention:   single patient room provided   rest/sleep promoted   equipment surfaces disinfected   hand  hygiene promoted   environmental surveillance performed  Taken 7/24/2024 1600 by Mell Wood RN  Infection Prevention:   single patient room provided   rest/sleep promoted   hand hygiene promoted   equipment surfaces disinfected   environmental surveillance performed  Taken 7/24/2024 1417 by Mell Wood RN  Infection Prevention:   single patient room provided   rest/sleep promoted   hand hygiene promoted   equipment surfaces disinfected   environmental surveillance performed  Taken 7/24/2024 1200 by Mell Wood RN  Infection Prevention:   single patient room provided   rest/sleep promoted   hand hygiene promoted   equipment surfaces disinfected   environmental surveillance performed  Taken 7/24/2024 1000 by Mell Wood RN  Infection Prevention:   single patient room provided   rest/sleep promoted   hand hygiene promoted   equipment surfaces disinfected   environmental surveillance performed  Taken 7/24/2024 0830 by Mell Wood RN  Infection Prevention:   single patient room provided   rest/sleep promoted   hand hygiene promoted   equipment surfaces disinfected   environmental surveillance performed  Goal: Optimal Comfort and Wellbeing  Outcome: Ongoing, Progressing  Intervention: Provide Person-Centered Care  Recent Flowsheet Documentation  Taken 7/24/2024 0830 by Mell Wood RN  Trust Relationship/Rapport:   care explained   choices provided   emotional support provided   empathic listening provided   questions answered   questions encouraged   reassurance provided   thoughts/feelings acknowledged  Goal: Readiness for Transition of Care  Outcome: Ongoing, Progressing     Problem: Skin Injury Risk Increased  Goal: Skin Health and Integrity  Outcome: Ongoing, Progressing  Intervention: Optimize Skin Protection  Recent Flowsheet Documentation  Taken 7/24/2024 1800 by Mell Wood RN  Head of Bed (HOB) Positioning: HOB elevated  Skin Protection:   adhesive use limited   tubing/devices free from skin  contact   skin-to-skin areas padded   skin-to-device areas padded  Taken 7/24/2024 1600 by Mell Wood RN  Head of Bed (HOB) Positioning: HOB elevated  Taken 7/24/2024 1417 by Mell Wood RN  Head of Bed (HOB) Positioning: HOB elevated  Taken 7/24/2024 1200 by Mell Wood RN  Head of Bed (HOB) Positioning: HOB elevated  Taken 7/24/2024 1000 by Mell Wood RN  Pressure Reduction Techniques:   frequent weight shift encouraged   weight shift assistance provided   heels elevated off bed   pressure points protected  Head of Bed (HOB) Positioning: HOB elevated  Skin Protection:   adhesive use limited   tubing/devices free from skin contact   skin-to-skin areas padded   skin-to-device areas padded  Taken 7/24/2024 0830 by Mell Wood RN  Pressure Reduction Techniques:   frequent weight shift encouraged   weight shift assistance provided   pressure points protected   heels elevated off bed  Head of Bed (HOB) Positioning: HOB elevated  Skin Protection:   adhesive use limited   tubing/devices free from skin contact   skin-to-skin areas padded   skin-to-device areas padded   incontinence pads utilized     Problem: Fall Injury Risk  Goal: Absence of Fall and Fall-Related Injury  Outcome: Ongoing, Progressing  Intervention: Identify and Manage Contributors  Recent Flowsheet Documentation  Taken 7/24/2024 1800 by Mell Wood RN  Medication Review/Management: medications reviewed  Taken 7/24/2024 1600 by Mell Wood RN  Medication Review/Management: medications reviewed  Self-Care Promotion:   independence encouraged   BADL personal objects within reach   BADL personal routines maintained   meal set-up provided  Taken 7/24/2024 1417 by Mell Wood RN  Medication Review/Management: medications reviewed  Taken 7/24/2024 1200 by Mell Wood RN  Medication Review/Management: medications reviewed  Self-Care Promotion:   independence encouraged   BADL personal objects within reach   BADL personal routines  maintained   meal set-up provided  Taken 7/24/2024 1000 by Mell Wood RN  Medication Review/Management: medications reviewed  Taken 7/24/2024 0830 by Mell Wood RN  Medication Review/Management: medications reviewed  Self-Care Promotion:   independence encouraged   BADL personal objects within reach   BADL personal routines maintained   meal set-up provided  Intervention: Promote Injury-Free Environment  Recent Flowsheet Documentation  Taken 7/24/2024 1800 by Mell Wood RN  Safety Promotion/Fall Prevention:   activity supervised   assistive device/personal items within reach   clutter free environment maintained   toileting scheduled   safety round/check completed   room organization consistent   fall prevention program maintained   lighting adjusted   nonskid shoes/slippers when out of bed  Taken 7/24/2024 1600 by Mell Wood RN  Safety Promotion/Fall Prevention:   activity supervised   assistive device/personal items within reach   clutter free environment maintained   toileting scheduled   safety round/check completed   room organization consistent   fall prevention program maintained   lighting adjusted   nonskid shoes/slippers when out of bed  Taken 7/24/2024 1417 by Mell Wood RN  Safety Promotion/Fall Prevention:   activity supervised   assistive device/personal items within reach   clutter free environment maintained   safety round/check completed   toileting scheduled   room organization consistent   fall prevention program maintained   lighting adjusted   nonskid shoes/slippers when out of bed  Taken 7/24/2024 1200 by Mell Wood RN  Safety Promotion/Fall Prevention:   activity supervised   assistive device/personal items within reach   clutter free environment maintained   toileting scheduled   safety round/check completed   room organization consistent   fall prevention program maintained   lighting adjusted   nonskid shoes/slippers when out of bed  Taken 7/24/2024 1000 by [place]  EFRAÍN [place]  Safety Promotion/Fall Prevention:   activity supervised   assistive device/personal items within reach   clutter free environment maintained   toileting scheduled   safety round/check completed   room organization consistent   fall prevention program maintained   lighting adjusted   nonskid shoes/slippers when out of bed  Taken 7/24/2024 0830 by Mell Wood, EFRAÍN  Safety Promotion/Fall Prevention:   activity supervised   clutter free environment maintained   assistive device/personal items within reach   toileting scheduled   safety round/check completed   room organization consistent   fall prevention program maintained   lighting adjusted   nonskid shoes/slippers when out of bed

## 2024-07-24 NOTE — THERAPY EVALUATION
PT order has been received and evaluation has been attempted; however, the patient declined working with therapy.  She reports she is going to go home with her daughter when she leaves the hospital.  PT will follow up tomorrow.

## 2024-07-24 NOTE — THERAPY EVALUATION
Pt declined OT evaluation today.  States she is going home with her daughter upon d/c from hospital.  Pt states she is weak, but continues to decline working with therapy.  Will attempt again tomorrow.

## 2024-07-24 NOTE — PROGRESS NOTES
"     [facility] Cardiology IP Progress Note    Gabi Dudley  1945  [id number]  07/24/24    Subjective:   Mrs. Gabi Dudley is a 79 y.o. female seen in follow-up.  No acute overnight events.  This morning, reports ongoing abdominal \"gas pain\" however overall improved compared to admission.  Currently denies chest pain or chest discomfort.    Review of Systems:   Review of Systems   Constitutional:  Negative for activity change, appetite change, chills, diaphoresis, fatigue, fever, unexpected weight gain and unexpected weight loss.   Respiratory:  Negative for cough, chest tightness, shortness of breath and wheezing.    Cardiovascular:  Negative for chest pain, palpitations and leg swelling.   Gastrointestinal:  Positive for abdominal pain. Negative for anal bleeding, blood in stool and GERD.   Genitourinary:  Negative for hematuria.   Neurological:  Negative for dizziness, syncope, weakness and light-headedness.   Hematological:  Does not bruise/bleed easily.   Psychiatric/Behavioral:  Negative for depressed mood and stress. The patient is not nervous/anxious.        I have reviewed and/or updated the patient's past medical, past surgical, family history, social history, problem list and allergies as appropriate in the chart.     Physical Exam:  Vital Signs:   Vitals:    07/24/24 0836 07/24/24 0841 07/24/24 1100 07/24/24 1112   BP: 91/64   119/68   BP Location:    Left arm   Patient Position:    Lying   Pulse:   84 85   Resp:    18   Temp:    99 °F (37.2 °C)   TempSrc:    Oral   SpO2:  98% 98% 99%   Weight:       Height:           Physical Exam  Vitals and nursing note reviewed.   Constitutional:       General: She is not in acute distress.     Appearance: Normal appearance. She is well-developed. She is not diaphoretic.   HENT:      Head: Normocephalic and atraumatic.   Eyes:      General: No scleral icterus.     Pupils: Pupils are equal, round, and reactive to light.   Neck:      Trachea: No " tracheal deviation.   Cardiovascular:      Rate and Rhythm: Normal rate and regular rhythm.      Heart sounds: Normal heart sounds. No murmur heard.     No friction rub. No gallop.      Comments: Normal JVD.  Pulmonary:      Effort: Pulmonary effort is normal. No respiratory distress.      Breath sounds: Normal breath sounds. No stridor. No wheezing or rales.   Chest:      Chest wall: No tenderness.   Abdominal:      General: Bowel sounds are normal. There is no distension.      Palpations: Abdomen is soft.      Tenderness: There is abdominal tenderness. There is no guarding or rebound.   Musculoskeletal:         General: No swelling. Normal range of motion.      Cervical back: Neck supple. No tenderness.   Lymphadenopathy:      Cervical: No cervical adenopathy.   Skin:     General: Skin is warm and dry.      Findings: No erythema.   Neurological:      General: No focal deficit present.      Mental Status: She is alert and oriented to person, place, and time.   Psychiatric:         Mood and Affect: Mood normal.         Behavior: Behavior normal.         Results Review:   Results from last 7 days   Lab Units 07/24/24  0839 07/23/24  0635   SODIUM mmol/L 141 145   POTASSIUM mmol/L 3.3* 3.5   CHLORIDE mmol/L 100 101   CO2 mmol/L 32.4* 31.3*   BUN mg/dL 18 16   CREATININE mg/dL 0.57 0.46*   CALCIUM mg/dL 8.9 8.8   BILIRUBIN mg/dL  --  0.2   ALK PHOS U/L  --  163*   ALT (SGPT) U/L  --  20   AST (SGOT) U/L  --  56*   GLUCOSE mg/dL 130* 105*     Results from last 7 days   Lab Units 07/23/24  0635 07/22/24  1124 07/22/24  0929   HSTROP T ng/L 375* 259* 289*     Results from last 7 days   Lab Units 07/24/24  0839 07/23/24  0635 07/22/24  0929   WBC 10*3/mm3 12.85* 12.38* 13.72*   HEMOGLOBIN g/dL 10.2* 10.3* 11.4*   HEMATOCRIT % 34.2 34.6 37.4   PLATELETS 10*3/mm3 257 220 262         Results from last 7 days   Lab Units 07/23/24  0635   MAGNESIUM mg/dL 1.8           I personally viewed and interpreted the patient's  EKG/Telemetry data     Medications:   )  Current Facility-Administered Medications:     acetaminophen (TYLENOL) tablet 650 mg, 650 mg, Oral, Q4H PRN, Milton Urban DO    albuterol (PROVENTIL) nebulizer solution 0.083% 2.5 mg/3mL, 2.5 mg, Nebulization, Q6H PRN, Milton Urban, DO    amiodarone (PACERONE) tablet 200 mg, 200 mg, Oral, Q24H, Milton Urban, DO, 200 mg at 07/24/24 0845    apixaban (ELIQUIS) tablet 5 mg, 5 mg, Oral, BID, Milton Urban, DO, 5 mg at 07/24/24 0834    atorvastatin (LIPITOR) tablet 40 mg, 40 mg, Oral, Daily, Azam, Britt J, APRN, 40 mg at 07/24/24 0835    sennosides-docusate (PERICOLACE) 8.6-50 MG per tablet 2 tablet, 2 tablet, Oral, BID, 2 tablet at 07/24/24 0834 **AND** polyethylene glycol (MIRALAX) packet 17 g, 17 g, Oral, Daily PRN **AND** bisacodyl (DULCOLAX) EC tablet 5 mg, 5 mg, Oral, Daily PRN **AND** bisacodyl (DULCOLAX) suppository 10 mg, 10 mg, Rectal, Daily PRN, Milton Urban, DO    budesonide (PULMICORT) nebulizer solution 0.5 mg, 0.5 mg, Nebulization, BID - RT, Milton Urban, , 0.5 mg at 07/24/24 0659    calcium carbonate (TUMS) chewable tablet 500 mg (200 mg elemental), 2 tablet, Oral, BID PRN, Milton Urban DO    cefTRIAXone (ROCEPHIN) 2,000 mg in sodium chloride 0.9 % 100 mL IVPB-VTB, 2,000 mg, Intravenous, Q24H, Milton Urban DO, Last Rate: 200 mL/hr at 07/23/24 1633, 2,000 mg at 07/23/24 1633    cetirizine (zyrTEC) tablet 10 mg, 10 mg, Oral, Daily, Milton Urban, DO, 10 mg at 07/24/24 0834    clonazePAM (KlonoPIN) tablet 1 mg, 1 mg, Oral, BID PRN, Milton Urban, DO, 1 mg at 07/23/24 2102    clotrimazole-betamethasone (LOTRISONE) 1-0.05 % cream 1 Application, 1 application , Topical, BID, Milton Urban, DO, 1 Application at 07/24/24 0838    cyclobenzaprine (FLEXERIL) tablet 5 mg, 5 mg, Oral, TID PRN, Milton Urban, DO, 5 mg at 07/24/24 045    Diclofenac Sodium (VOLTAREN) 1 % gel 4 g, 4 g, Topical,  4x Daily PRN, Milton Urban, DO    DULoxetine (CYMBALTA) DR capsule 60 mg, 60 mg, Oral, BID, Milton Urban, , 60 mg at 07/24/24 0835    ferrous sulfate EC tablet 324 mg, 324 mg, Oral, Daily With Breakfast, Milton Urban, DO, 324 mg at 07/24/24 0834    fluticasone (FLONASE) 50 MCG/ACT nasal spray 2 spray, 2 spray, Nasal, Daily, Milton Urban DO, 2 spray at 07/24/24 0837    ipratropium-albuterol (DUO-NEB) nebulizer solution 3 mL, 3 mL, Nebulization, Q6H While Awake - RT, Milton Urban, , 3 mL at 07/24/24 0659    melatonin tablet 5 mg, 5 mg, Oral, Nightly, Milton Urban, DO, 5 mg at 07/23/24 2000    metoprolol succinate XL (TOPROL-XL) 24 hr tablet 25 mg, 25 mg, Oral, Q24H, Azam, Britt CONLEY, APRN, 25 mg at 07/23/24 1215    metroNIDAZOLE (FLAGYL) IVPB 500 mg, 500 mg, Intravenous, Q8H, Milton Urban DO, Last Rate: 200 mL/hr at 07/24/24 0837, 500 mg at 07/24/24 0837    nitroglycerin (NITROSTAT) SL tablet 0.4 mg, 0.4 mg, Sublingual, Q5 Min PRN, Milton Urban, DO    ondansetron ODT (ZOFRAN-ODT) disintegrating tablet 4 mg, 4 mg, Oral, Q6H PRN **OR** ondansetron (ZOFRAN) injection 4 mg, 4 mg, Intravenous, Q6H PRN, Milton Urban, DO, 4 mg at 07/24/24 0459    ondansetron ODT (ZOFRAN-ODT) disintegrating tablet 4 mg, 4 mg, Translingual, Q8H PRN, Milton Urban, DO, 4 mg at 07/23/24 2014    oxyCODONE-acetaminophen (PERCOCET) 7.5-325 MG per tablet 1 tablet, 1 tablet, Oral, Q4H PRN, Milton Urban DO, 1 tablet at 07/24/24 1208    pantoprazole (PROTONIX) EC tablet 40 mg, 40 mg, Oral, Daily, Milton Urban DO, 40 mg at 07/24/24 0835    potassium chloride 10 mEq in 100 mL IVPB, 10 mEq, Intravenous, Q1H, Britt Nascimento APRN, Last Rate: 100 mL/hr at 07/24/24 1208, 10 mEq at 07/24/24 1208    Potassium Replacement - Follow Nurse / BPA Driven Protocol, , Does not apply, PRN, AzamBritt forde, APRN    QUEtiapine (SEROquel) tablet 12.5 mg, 12.5 mg, Oral, Nightly,  Milton Urban, DO, 12.5 mg at 07/23/24 2000    sertraline (ZOLOFT) tablet 150 mg, 150 mg, Oral, Daily, Milton Urban Albert, DO, 150 mg at 07/24/24 0836    sodium chloride 0.9 % flush 10 mL, 10 mL, Intravenous, PRN, Rajni, Milton Albert, DO    sodium chloride 0.9 % flush 10 mL, 10 mL, Intravenous, Q12H, Rajni, Milton Albert, DO, 10 mL at 07/24/24 0837    sodium chloride 0.9 % flush 10 mL, 10 mL, Intravenous, PRN, Rajni, Milton Albert, DO, 10 mL at 07/24/24 0459    sodium chloride 0.9 % infusion 40 mL, 40 mL, Intravenous, PRN, Milton Urban Albert, DO    sodium chloride nasal spray 2 spray, 2 spray, Nasal, PRN, Rajni, Milton Albert, DO    traZODone (DESYREL) tablet 100 mg, 100 mg, Oral, Nightly PRN, Milton Urban Albert, DO    vitamin D3 capsule 5,000 Units, 5,000 Units, Oral, Daily, Rajni, Milton Grangerith, DO, 5,000 Units at 07/24/24 0835    Assessment:  1.  Coronary artery disease / Elevated troponin level  2.  Paroxysmal atrial fibrillation  3.  Bowel obstruction  4.  Hypertension  5.  COPD     Plan:  1.  Coronary artery disease  --History of nonobstructive coronary artery disease based on remote coronary angiogram  -- Echocardiogram yesterday showed apical dyskinesis, LVEF approximately 40%  -- Several potential etiologies for cardiomyopathy including stress-induced, LAD territory disease/infarct  -- Given elevated troponin level and wall motion abnormalities on echocardiogram, again discussed invasive ischemic evaluation to further evaluate for ischemic etiology, however the patient remains hesitant to proceed and continues to opt for medical management  -- Will start aspirin 81 mg daily  -- Continue atorvastatin    2.  Cardiomyopathy  -- Echocardiogram shows apical dyskinesis with LVEF 40%  -- Some potential etiologies including ischemic etiology, stress-induced, etc.  -- Currently declining ischemic evaluation (as above)  -- Appears near euvolemic on exam  -- Continue metoprolol  -- Unlikely to currently tolerate  ACE inhibitor or ARB due to hypotension, reevaluate BP tomorrow      VIDAL Curry MD  Interventional Cardiology   07/24/24  12:41 EDT

## 2024-07-25 LAB
ANION GAP SERPL CALCULATED.3IONS-SCNC: 8.9 MMOL/L (ref 5–15)
ANISOCYTOSIS BLD QL: NORMAL
BASOPHILS # BLD AUTO: 0.02 10*3/MM3 (ref 0–0.2)
BASOPHILS NFR BLD AUTO: 0.2 % (ref 0–1.5)
BUN SERPL-MCNC: 22 MG/DL (ref 8–23)
BUN/CREAT SERPL: 43.1 (ref 7–25)
CALCIUM SPEC-SCNC: 8.7 MG/DL (ref 8.6–10.5)
CHLORIDE SERPL-SCNC: 104 MMOL/L (ref 98–107)
CO2 SERPL-SCNC: 34.1 MMOL/L (ref 22–29)
CREAT SERPL-MCNC: 0.51 MG/DL (ref 0.57–1)
DEPRECATED RDW RBC AUTO: 51.1 FL (ref 37–54)
EGFRCR SERPLBLD CKD-EPI 2021: 96.3 ML/MIN/1.73
ELLIPTOCYTES BLD QL SMEAR: NORMAL
EOSINOPHIL # BLD AUTO: 0.03 10*3/MM3 (ref 0–0.4)
EOSINOPHIL NFR BLD AUTO: 0.3 % (ref 0.3–6.2)
ERYTHROCYTE [DISTWIDTH] IN BLOOD BY AUTOMATED COUNT: 15.6 % (ref 12.3–15.4)
GLUCOSE SERPL-MCNC: 95 MG/DL (ref 65–99)
HCT VFR BLD AUTO: 31.7 % (ref 34–46.6)
HGB BLD-MCNC: 9.3 G/DL (ref 12–15.9)
HYPOCHROMIA BLD QL: NORMAL
IMM GRANULOCYTES # BLD AUTO: 0.05 10*3/MM3 (ref 0–0.05)
IMM GRANULOCYTES NFR BLD AUTO: 0.5 % (ref 0–0.5)
LYMPHOCYTES # BLD AUTO: 0.74 10*3/MM3 (ref 0.7–3.1)
LYMPHOCYTES NFR BLD AUTO: 7.7 % (ref 19.6–45.3)
MCH RBC QN AUTO: 26.5 PG (ref 26.6–33)
MCHC RBC AUTO-ENTMCNC: 29.3 G/DL (ref 31.5–35.7)
MCV RBC AUTO: 90.3 FL (ref 79–97)
MONOCYTES # BLD AUTO: 0.66 10*3/MM3 (ref 0.1–0.9)
MONOCYTES NFR BLD AUTO: 6.8 % (ref 5–12)
NEUTROPHILS NFR BLD AUTO: 8.15 10*3/MM3 (ref 1.7–7)
NEUTROPHILS NFR BLD AUTO: 84.5 % (ref 42.7–76)
NRBC BLD AUTO-RTO: 0 /100 WBC (ref 0–0.2)
PLATELET # BLD AUTO: 236 10*3/MM3 (ref 140–450)
PMV BLD AUTO: 11.4 FL (ref 6–12)
POIKILOCYTOSIS BLD QL SMEAR: NORMAL
POTASSIUM SERPL-SCNC: 4.5 MMOL/L (ref 3.5–5.2)
RBC # BLD AUTO: 3.51 10*6/MM3 (ref 3.77–5.28)
SMALL PLATELETS BLD QL SMEAR: ADEQUATE
SODIUM SERPL-SCNC: 147 MMOL/L (ref 136–145)
WBC MORPH BLD: NORMAL
WBC NRBC COR # BLD AUTO: 9.65 10*3/MM3 (ref 3.4–10.8)

## 2024-07-25 PROCEDURE — 85007 BL SMEAR W/DIFF WBC COUNT: CPT | Performed by: NURSE PRACTITIONER

## 2024-07-25 PROCEDURE — 94799 UNLISTED PULMONARY SVC/PX: CPT

## 2024-07-25 PROCEDURE — 94761 N-INVAS EAR/PLS OXIMETRY MLT: CPT

## 2024-07-25 PROCEDURE — 99232 SBSQ HOSP IP/OBS MODERATE 35: CPT | Performed by: SURGERY

## 2024-07-25 PROCEDURE — 80048 BASIC METABOLIC PNL TOTAL CA: CPT | Performed by: NURSE PRACTITIONER

## 2024-07-25 PROCEDURE — 97166 OT EVAL MOD COMPLEX 45 MIN: CPT

## 2024-07-25 PROCEDURE — 25010000002 CEFTRIAXONE PER 250 MG: Performed by: NURSE PRACTITIONER

## 2024-07-25 PROCEDURE — 94664 DEMO&/EVAL PT USE INHALER: CPT

## 2024-07-25 PROCEDURE — 99232 SBSQ HOSP IP/OBS MODERATE 35: CPT | Performed by: NURSE PRACTITIONER

## 2024-07-25 PROCEDURE — 85025 COMPLETE CBC W/AUTO DIFF WBC: CPT | Performed by: NURSE PRACTITIONER

## 2024-07-25 PROCEDURE — 25010000002 METRONIDAZOLE 500 MG/100ML SOLUTION: Performed by: INTERNAL MEDICINE

## 2024-07-25 PROCEDURE — 97162 PT EVAL MOD COMPLEX 30 MIN: CPT

## 2024-07-25 RX ADMIN — OXYCODONE HYDROCHLORIDE AND ACETAMINOPHEN 1 TABLET: 7.5; 325 TABLET ORAL at 19:15

## 2024-07-25 RX ADMIN — APIXABAN 5 MG: 5 TABLET, FILM COATED ORAL at 09:56

## 2024-07-25 RX ADMIN — FERROUS SULFATE TAB EC 324 MG (65 MG FE EQUIVALENT) 324 MG: 324 (65 FE) TABLET DELAYED RESPONSE at 09:56

## 2024-07-25 RX ADMIN — CETIRIZINE HYDROCHLORIDE 10 MG: 10 TABLET, FILM COATED ORAL at 09:56

## 2024-07-25 RX ADMIN — CLONAZEPAM 1 MG: 0.5 TABLET ORAL at 04:56

## 2024-07-25 RX ADMIN — Medication 10 ML: at 09:54

## 2024-07-25 RX ADMIN — DULOXETINE HYDROCHLORIDE 60 MG: 30 CAPSULE, DELAYED RELEASE ORAL at 09:56

## 2024-07-25 RX ADMIN — METRONIDAZOLE 500 MG: 5 INJECTION, SOLUTION INTRAVENOUS at 00:33

## 2024-07-25 RX ADMIN — SENNOSIDES AND DOCUSATE SODIUM 2 TABLET: 50; 8.6 TABLET ORAL at 09:56

## 2024-07-25 RX ADMIN — POLYETHYLENE GLYCOL 3350 17 G: 17 POWDER, FOR SOLUTION ORAL at 03:52

## 2024-07-25 RX ADMIN — FLUTICASONE PROPIONATE 2 SPRAY: 50 SPRAY, METERED NASAL at 09:55

## 2024-07-25 RX ADMIN — IPRATROPIUM BROMIDE AND ALBUTEROL SULFATE 3 ML: .5; 3 SOLUTION RESPIRATORY (INHALATION) at 12:44

## 2024-07-25 RX ADMIN — QUETIAPINE FUMARATE 12.5 MG: 25 TABLET ORAL at 22:45

## 2024-07-25 RX ADMIN — Medication 5000 UNITS: at 09:55

## 2024-07-25 RX ADMIN — CEFTRIAXONE SODIUM 2000 MG: 2 INJECTION, POWDER, FOR SOLUTION INTRAMUSCULAR; INTRAVENOUS at 16:40

## 2024-07-25 RX ADMIN — IPRATROPIUM BROMIDE AND ALBUTEROL SULFATE 3 ML: .5; 3 SOLUTION RESPIRATORY (INHALATION) at 19:43

## 2024-07-25 RX ADMIN — APIXABAN 5 MG: 5 TABLET, FILM COATED ORAL at 22:40

## 2024-07-25 RX ADMIN — IPRATROPIUM BROMIDE AND ALBUTEROL SULFATE 3 ML: .5; 3 SOLUTION RESPIRATORY (INHALATION) at 07:12

## 2024-07-25 RX ADMIN — OXYCODONE HYDROCHLORIDE AND ACETAMINOPHEN 1 TABLET: 7.5; 325 TABLET ORAL at 03:52

## 2024-07-25 RX ADMIN — CYCLOBENZAPRINE 5 MG: 10 TABLET, FILM COATED ORAL at 22:40

## 2024-07-25 RX ADMIN — SERTRALINE 150 MG: 50 TABLET, FILM COATED ORAL at 09:55

## 2024-07-25 RX ADMIN — ATORVASTATIN CALCIUM 40 MG: 40 TABLET, FILM COATED ORAL at 09:55

## 2024-07-25 RX ADMIN — METRONIDAZOLE 500 MG: 5 INJECTION, SOLUTION INTRAVENOUS at 09:54

## 2024-07-25 RX ADMIN — CLOTRIMAZOLE AND BETAMETHASONE DIPROPIONATE 1 APPLICATION: 10; .5 CREAM TOPICAL at 09:55

## 2024-07-25 RX ADMIN — CLOTRIMAZOLE AND BETAMETHASONE DIPROPIONATE 1 APPLICATION: 10; .5 CREAM TOPICAL at 22:49

## 2024-07-25 RX ADMIN — METOPROLOL SUCCINATE 25 MG: 25 TABLET, EXTENDED RELEASE ORAL at 09:55

## 2024-07-25 RX ADMIN — DULOXETINE HYDROCHLORIDE 60 MG: 30 CAPSULE, DELAYED RELEASE ORAL at 22:41

## 2024-07-25 RX ADMIN — Medication 5 MG: at 22:41

## 2024-07-25 RX ADMIN — BUDESONIDE 0.5 MG: 0.5 INHALANT RESPIRATORY (INHALATION) at 19:43

## 2024-07-25 RX ADMIN — AMIODARONE HYDROCHLORIDE 200 MG: 200 TABLET ORAL at 09:56

## 2024-07-25 RX ADMIN — METRONIDAZOLE 500 MG: 5 INJECTION, SOLUTION INTRAVENOUS at 16:40

## 2024-07-25 RX ADMIN — PANTOPRAZOLE SODIUM 40 MG: 40 TABLET, DELAYED RELEASE ORAL at 09:56

## 2024-07-25 RX ADMIN — ACETAMINOPHEN 650 MG: 325 TABLET, FILM COATED ORAL at 01:29

## 2024-07-25 RX ADMIN — BUDESONIDE 0.5 MG: 0.5 INHALANT RESPIRATORY (INHALATION) at 07:12

## 2024-07-25 NOTE — THERAPY EVALUATION
Patient Name: Gabi Dudley  : 1945    MRN: [id number]                              Today's Date: 2024       Admit Date: 2024    Visit Dx:     ICD-10-CM ICD-9-CM   1. NSTEMI (non-ST elevated myocardial infarction)  I21.4 410.70   2. Diverticulitis  K57.92 562.11   3. Diastasis of rectus abdominis  M62.08 728.84   4. Abdominal pain, unspecified abdominal location  R10.9 789.00   5. Nausea and vomiting, unspecified vomiting type  R11.2 787.01     Patient Active Problem List   Diagnosis    Adrenal adenoma    Anxiety    Chronic fatigue    Fibromyalgia    Hyperlipidemia    Essential hypertension    Insomnia    Osteoarthritis of knee    Osteoporosis    Pulmonary emphysema    Simple renal cyst    Tension headache    Vitamin D deficiency    Diverticulosis    Inversion of nipple    Paroxysmal atrial fibrillation    Coronary artery disease involving native coronary artery of native heart without angina pectoris    Autonomic dysfunction    Gastroesophageal reflux disease without esophagitis    Urge incontinence    Erythrasma    Compression fracture of T5 vertebra    Lung nodule    COPD (chronic obstructive pulmonary disease)    Overweight (BMI 25.0-29.9)    Acute on chronic respiratory failure with hypoxia    Acute on chronic respiratory failure with hypoxia and hypercapnia    Iron deficiency anemia    Impaired mobility and ADLs    Acute respiratory failure with hypoxia and hypercapnia    Aspiration pneumonia of right lower lobe    Partial small bowel obstruction    Moderate malnutrition    Bowel obstruction    Enteritis    Diverticulitis    Elevated troponin     Past Medical History:   Diagnosis Date    Adrenal adenoma     Anemia     Arrhythmia     Asthma     Atrial fibrillation     Back pain     Benign colonic polyp     Benign tumor of adrenal gland     Cataract     bilateral    Cholelithiasis     Chronic bronchitis     Chronic bronchitis with COPD (chronic obstructive pulmonary disease)     COPD (chronic  obstructive pulmonary disease) 2005    Coronary artery disease     Depression     Diverticulosis Years ago    Elevated cholesterol     Environmental and seasonal allergies     Fibromyalgia     Fibromyalgia, primary Sometime in the 90s    Gastritis     Generalized anxiety disorder     GERD (gastroesophageal reflux disease)     H/O mammogram     Headache Years ago    Hemorrhoids     History of blood transfusion 1985    History of blood transfusion 1985    History of echocardiogram     History of endometriosis     History of nuclear stress test     Hospitalization or health care facility admission within last 6 months     5 times between 11/2022-05/2023    Hypertension     IBS (irritable bowel syndrome)     Impaired functional mobility, balance, gait, and endurance     Impaired mobility     Inverted nipple     Kidney disease     Kidney stone     Liver cyst     Low back pain Years ago    Nodular radiologic density     Nodule of left lung     On home oxygen therapy     2 liters NC QHS    Osteoarthritis     Osteopenia Several years ago    Osteoporosis     PONV (postoperative nausea and vomiting)     Renal cyst     Sinus problem     2014    Sinusitis     Skin cancer     basal cell carcinoma    SOB (shortness of breath)     Tobacco use     Urinary frequency     Urinary tract infection Years ago    Frequent UTIs    Vitamin D deficiency     Wears glasses     Wears partial dentures     upper plate     Past Surgical History:   Procedure Laterality Date    APPENDECTOMY  1980s    CARDIAC CATHETERIZATION  2003    CATARACT EXTRACTION  2013    both eyes    CHOLECYSTECTOMY  2020    CHOLECYSTECTOMY WITH INTRAOPERATIVE CHOLANGIOGRAM N/A 10/30/2020    Procedure: CHOLECYSTECTOMY LAPAROSCOPIC INTRAOPERATIVE CHOLANGIOGRAPHY;  Surgeon: Sima Moses MD;  Location: [facility];  Service: General;  Laterality: N/A;    COLON SURGERY  2020    COLONOSCOPY  03/11/2013    COLONOSCOPY  06/21/2016    COLONOSCOPY N/A 07/24/2019    Procedure:  COLONOSCOPY W/ COLD FORCEP POLYPECTOMIES; HOT SNARE POLYPECTOMIES; COLD SNARE POLYPECTOMY;  Surgeon: Goyo Nunez MD;  Location: [facility];  Service: Gastroenterology    COLONOSCOPY W/ BIOPSIES AND POLYPECTOMY      EXPLORATORY LAPAROTOMY N/A 11/23/2020    Procedure: colectomy, right, closure of enterotomy x 2, reduction of internal volvulus;  Surgeon: Sima Moses MD;  Location: [facility];  Service: General;  Laterality: N/A;    EXPLORATORY LAPAROTOMY N/A 8/9/2023    Procedure: LAPAROTOMY EXPLORATORY WITH LYIS OF ADHESIONS AND  CENTRAL LINE INSERTION;  Surgeon: Bianca Isaac DO;  Location: [facility];  Service: General;  Laterality: N/A;    HYSTERECTOMY  1980s    partial    LYSIS OF ABDOMINAL ADHESIONS      TONSILLECTOMY  1987    UPPER GASTROINTESTINAL ENDOSCOPY  01/13/2015    VAGINAL DELIVERY      x2      General Information       Row Name 07/25/24 1238          Physical Therapy Time and Intention    Document Type evaluation  -MS     Mode of Treatment physical therapy  -MS       Row Name 07/25/24 1238          General Information    Patient Profile Reviewed yes  -MS     Prior Level of Function independent:;all household mobility;community mobility  -MS     Barriers to Rehab medically complex;previous functional deficit  -MS       Row Name 07/25/24 1238          Living Environment    People in Home child(ashely), adult  -MS       Row Name 07/25/24 1238          Home Main Entrance    Number of Stairs, Main Entrance none  -MS       Row Name 07/25/24 1238          Cognition    Orientation Status (Cognition) oriented x 4  -MS       Row Name 07/25/24 1238          Safety Issues, Functional Mobility    Safety Issues Affecting Function (Mobility) insight into deficits/self-awareness;safety precautions follow-through/compliance;positioning of assistive device;sequencing abilities;awareness of need for assistance;safety precaution awareness  -MS     Impairments Affecting Function (Mobility)  balance;endurance/activity tolerance;shortness of breath;strength;pain  -MS               User Key  (r) = Recorded By, (t) = Taken By, (c) = Cosigned By      Initials Name Provider Type    Farzad Arndt, HANS Physical Therapist                   Mobility       Row Name 07/25/24 1239          Bed Mobility    Bed Mobility rolling left  -MS     Rolling Left [place] (Bed Mobility) standby assist  -MS     Assistive Device (Bed Mobility) bed rails;draw sheet;head of bed elevated  -MS       Row Name 07/25/24 1239          Sit-Stand Transfer    Sit-Stand [place] (Transfers) not tested  -MS       Row Name 07/25/24 1239          Gait/Stairs (Locomotion)    [place] Level (Gait) not tested  -MS               User Key  (r) = Recorded By, (t) = Taken By, (c) = Cosigned By      Initials Name Provider Type    Farzad Arndt, PT Physical Therapist                   Obj/Interventions       Row Name 07/25/24 1241          Range of Motion Comprehensive    General Range of Motion bilateral lower extremity ROM WFL  -MS       Row Name 07/25/24 1241          Strength Comprehensive (MMT)    General Manual Muscle Testing (MMT) Assessment lower extremity strength deficits identified  -MS     Comment, General Manual Muscle Testing (MMT) Assessment Pt declined MMT due to pain  -MS               User Key  (r) = Recorded By, (t) = Taken By, (c) = Cosigned By      Initials Name Provider Type    Farzad Arndt, PT Physical Therapist                   Goals/Plan       Row Name 07/25/24 1254          Bed Mobility Goal 1 (PT)    Activity/Assistive Device (Bed Mobility Goal 1, PT) bed mobility activities, all  -MS     [place] Level/Cues Needed (Bed Mobility Goal 1, PT) standby assist  -MS     Time Frame (Bed Mobility Goal 1, PT) long term goal (LTG);2 weeks  -MS       Row Name 07/25/24 1254          Transfer Goal 1 (PT)    Activity/Assistive Device (Transfer Goal 1, PT) transfers, all;sit-to-stand/stand-to-sit  -MS      [place] Level/Cues Needed (Transfer Goal 1, PT) standby assist  -MS     Time Frame (Transfer Goal 1, PT) long term goal (LTG);2 weeks  -MS       Row Name 07/25/24 1254          Gait Training Goal 1 (PT)    Activity/Assistive Device (Gait Training Goal 1, PT) gait (walking locomotion);assistive device use  -MS     [place] Level (Gait Training Goal 1, PT) contact guard required  -MS     Distance (Gait Training Goal 1, PT) 10ft  -MS     Time Frame (Gait Training Goal 1, PT) long term goal (LTG);2 weeks  -MS       Row Name 07/25/24 1254          Patient Education Goal (PT)    Activity (Patient Education Goal, PT) Pt verbalizes understanding of importance of mobility and participation in PT tx  -MS     [place]/Cues/Accuracy (Memory Goal 2, PT) verbalizes understanding  -MS     Time Frame (Patient Education Goal, PT) short term goal (STG);2 weeks;1 week  -MS       Row Name 07/25/24 1254          Therapy Assessment/Plan (PT)    Planned Therapy Interventions (PT) balance training;bed mobility training;gait training;home exercise program;stair training;strengthening;ROM (range of motion);neuromuscular re-education;transfer training;patient/family education  -MS               User Key  (r) = Recorded By, (t) = Taken By, (c) = Cosigned By      Initials Name Provider Type    Farzad Arndt, PT Physical Therapist                   Clinical Impression       Row Name 07/25/24 1242          Pain    Pretreatment Pain Rating 10/10  -MS     Posttreatment Pain Rating 10/10  -MS     Pain Location generalized  -MS     Pain Intervention(s) Repositioned  -MS       Row Name 07/25/24 1242          Plan of Care Review    Plan of Care Reviewed With patient;daughter  -MS     Progress no change  -MS     Outcome Evaluation Pt participated in PT eval this date. Pt was independent with gait with rollator prior to June, in June she had a lumbar compression fx, and since then her daughter states she has mainly just been  transferring to a w/c.  Pt states that she is in alot of pain all over and is unable to do much. States she is uncomfortable and would like to be on her side to see if she can relieve her pain. Pt was SBA to roll to the L. Declined further OOB mobility due to pain. Pt states she would like to go home, but did not decline swing bed option when her daughter discussed it. Pt is expected to benefit from skilled PT tx during this inpatient stay to address deficits in strength, transfers and mobility.  -MS       Row Name 07/25/24 1242          Therapy Assessment/Plan (PT)    Rehab Potential (PT) good, to achieve stated therapy goals  -MS     Criteria for Skilled Interventions Met (PT) yes;meets criteria  -MS     Therapy Frequency (PT) 3 times/wk  -MS       Row Name 07/25/24 1242          Vital Signs    O2 Delivery Pre Treatment supplemental O2  -MS     O2 Delivery Intra Treatment supplemental O2  -MS     O2 Delivery Post Treatment supplemental O2  -MS     Pre Patient Position Supine  -MS     Intra Patient Position Side Lying  -MS     Post Patient Position Side Lying  -MS       Row Name 07/25/24 1242          Positioning and Restraints    Pre-Treatment Position in bed  -MS     Post Treatment Position bed  -MS     In Bed side lying left;call light within reach;encouraged to call for assist;patient within staff view  unable to set bed alarm as Pt was not centered due to being on her L side in the bed and Pt declined being moved further  -MS               User Key  (r) = Recorded By, (t) = Taken By, (c) = Cosigned By      Initials Name Provider Type    Farzad Arndt, PT Physical Therapist                   Outcome Measures       Row Name 07/25/24 1257          How much help from another person do you currently need...    Turning from your back to your side while in flat bed without using bedrails? 4  -MS     Moving from lying on back to sitting on the side of a flat bed without bedrails? 4  -MS     Moving to and from a  bed to a chair (including a wheelchair)? 3  -MS     Standing up from a chair using your arms (e.g., wheelchair, bedside chair)? 3  -MS     Climbing 3-5 steps with a railing? 1  -MS     To walk in hospital room? 2  -MS     AM-PAC 6 Clicks Score (PT) 17  -MS     Highest Level of Mobility Goal 5 --> Static standing  -MS               User Key  (r) = Recorded By, (t) = Taken By, (c) = Cosigned By      Initials Name Provider Type    MS Farzad Crowder PT Physical Therapist                                 Physical Therapy Education       Title: PT OT SLP Therapies (In Progress)       Topic: Physical Therapy (In Progress)       Point: Mobility training (Done)       Learning Progress Summary             Patient Acceptance, E, VU by MS at 7/25/2024 1257    Comment: importance of mobility                         Point: Home exercise program (Done)       Learning Progress Summary             Patient Acceptance, E, VU by MS at 7/25/2024 1257    Comment: importance of mobility                         Point: Body mechanics (Not Started)       Learner Progress:  Not documented in this visit.              Point: Precautions (Not Started)       Learner Progress:  Not documented in this visit.                              User Key       Initials Effective Dates Name Provider Type Discipline    MS 08/22/23 -  Farzad Crowder PT Physical Therapist PT                  PT Recommendation and Plan  Planned Therapy Interventions (PT): balance training, bed mobility training, gait training, home exercise program, stair training, strengthening, ROM (range of motion), neuromuscular re-education, transfer training, patient/family education  Plan of Care Reviewed With: patient, daughter  Progress: no change  Outcome Evaluation: Pt participated in PT eval this date. Pt was independent with gait with rollator prior to June, in June she had a lumbar compression fx, and since then her daughter states she has mainly just been transferring to a  w/c.  Pt states that she is in alot of pain all over and is unable to do much. States she is uncomfortable and would like to be on her side to see if she can relieve her pain. Pt was SBA to roll to the L. Declined further OOB mobility due to pain. Pt states she would like to go home, but did not decline swing bed option when her daughter discussed it. Pt is expected to benefit from skilled PT tx during this inpatient stay to address deficits in strength, transfers and mobility.     Time Calculation:   PT Evaluation Complexity  History, PT Evaluation Complexity: 1-2 personal factors and/or comorbidities  Examination of Body Systems (PT Eval Complexity): total of 3 or more elements  Clinical Presentation (PT Evaluation Complexity): evolving  Clinical Decision Making (PT Evaluation Complexity): moderate complexity  Overall Complexity (PT Evaluation Complexity): moderate complexity     PT Charges       Row Name 07/25/24 1258             Time Calculation    Start Time 1010  -MS      PT Received On 07/25/24  -MS      PT Goal Re-Cert Due Date 08/04/24  -MS         Untimed Charges    PT Eval/Re-eval Minutes 56  -MS         Total Minutes    Untimed Charges Total Minutes 56  -MS       Total Minutes 56  -MS                User Key  (r) = Recorded By, (t) = Taken By, (c) = Cosigned By      Initials Name Provider Type    Farzad Arndt, PT Physical Therapist                  Therapy Charges for Today       Code Description Service Date Service Provider Modifiers Qty    [id number] HC PT EVAL MOD COMPLEXITY 4 7/25/2024 Farzad Crowder PT GP 1            PT G-Codes  AM-PAC 6 Clicks Score (PT): 17  PT Discharge Summary  Anticipated Discharge Disposition (PT): skilled nursing facility, inpatient rehabilitation facility, home with assist, home with home health    Farzad Crowder PT  7/25/2024

## 2024-07-25 NOTE — PROGRESS NOTES
LOS: 3 days   Patient Care Team:  Krystina Saunders DO as PCP - General (Family Medicine)  Sima Moses MD as Consulting Physician (General Surgery)  Taiwo Curry MD as Consulting Physician (Cardiology)       Chief Complaint: Abdominal pain    HPI: Patient with continued mainly left lower abdominal pain.  She states that it is much improved from yesterday.  No nausea or vomiting.  Multiple bowel movements.    ROS:    Vital Signs  Temp:  [98 °F (36.7 °C)-99 °F (37.2 °C)] 98.8 °F (37.1 °C)  Heart Rate:  [80-85] 83  Resp:  [16-20] 18  BP: (104-115)/(57-69) 104/66    Physical Exam:     General Appearance:  Alert and cooperative, not in any acute distress.   Cardiovascular/Heart:  Normal S1 and S2,   GI/Abdomen:   Soft and nondistended with mild tenderness in the left lower quadrant.  No surgical findings     Results Review:       Lab Results (last 24 hours)       Procedure Component Value Units Date/Time    Urine Culture - Urine, Urine, Clean Catch [[id number]]  (Normal) Collected: 07/24/24 1600    Specimen: Urine, Clean Catch Updated: 07/25/24 1105     Urine Culture No growth    Basic Metabolic Panel [[id number]]  (Abnormal) Collected: 07/25/24 0657    Specimen: Blood Updated: 07/25/24 0856     Glucose 95 mg/dL      BUN 22 mg/dL      Creatinine 0.51 mg/dL      Sodium 147 mmol/L      Potassium 4.5 mmol/L      Chloride 104 mmol/L      CO2 34.1 mmol/L      Calcium 8.7 mg/dL      BUN/Creatinine Ratio 43.1     Anion Gap 8.9 mmol/L      eGFR 96.3 mL/min/1.73     Narrative:      GFR Normal >60  Chronic Kidney Disease <60  Kidney Failure <15    The GFR formula is only valid for adults with stable renal function between ages 18 and 70.    CBC & Differential [[id number]]  (Abnormal) Collected: 07/25/24 0657    Specimen: Blood Updated: 07/25/24 0852    Narrative:      The following orders were created for panel order CBC & Differential.  Procedure                               Abnormality         Status                      ---------                               -----------         ------                     CBC Auto Differential[[id number]]        Abnormal            Final result               Scan Slide[[id number]]                                       Final result                 Please view results for these tests on the individual orders.    Scan Slide [[id number]] Collected: 07/25/24 0657    Specimen: Blood Updated: 07/25/24 0852     Anisocytosis Slight/1+     Elliptocytes Slight/1+     Hypochromia Slight/1+     Poikilocytes Slight/1+     WBC Morphology Normal     Platelet Estimate Adequate    CBC Auto Differential [[id number]]  (Abnormal) Collected: 07/25/24 0657    Specimen: Blood Updated: 07/25/24 0852     WBC 9.65 10*3/mm3      RBC 3.51 10*6/mm3      Hemoglobin 9.3 g/dL      Hematocrit 31.7 %      MCV 90.3 fL      MCH 26.5 pg      MCHC 29.3 g/dL      RDW 15.6 %      RDW-SD 51.1 fl      MPV 11.4 fL      Platelets 236 10*3/mm3      Neutrophil % 84.5 %      Lymphocyte % 7.7 %      Monocyte % 6.8 %      Eosinophil % 0.3 %      Basophil % 0.2 %      Immature Grans % 0.5 %      Neutrophils, Absolute 8.15 10*3/mm3      Lymphocytes, Absolute 0.74 10*3/mm3      Monocytes, Absolute 0.66 10*3/mm3      Eosinophils, Absolute 0.03 10*3/mm3      Basophils, Absolute 0.02 10*3/mm3      Immature Grans, Absolute 0.05 10*3/mm3      nRBC 0.0 /100 WBC     Potassium [[id number]]  (Normal) Collected: 07/24/24 2124    Specimen: Blood Updated: 07/24/24 2157     Potassium 4.0 mmol/L     Urinalysis, Microscopic Only - Urine, Clean Catch [[id number]]  (Abnormal) Collected: 07/24/24 1600    Specimen: Urine, Clean Catch Updated: 07/24/24 1651     RBC, UA None Seen /HPF      WBC, UA 3-5 /HPF      Bacteria, UA 1+ /HPF      Squamous Epithelial Cells, UA 3-6 /HPF      Hyaline Casts, UA 3-6 /LPF      Mucus, UA Moderate/2+ /HPF      Starch, UA Small/1+ /HPF      Methodology Manual Light Microscopy    Urinalysis With Microscopic If Indicated (No Culture) -  Urine, Clean Catch [[id number]]  (Abnormal) Collected: 07/24/24 1600    Specimen: Urine, Clean Catch Updated: 07/24/24 1630     Color, UA Dark Yellow     Appearance, UA Cloudy     pH, UA 6.0     Specific Gravity, UA 1.028     Glucose, UA Negative     Ketones, UA Trace     Bilirubin, UA Small (1+)     Blood, UA Negative     Protein, UA 30 mg/dL (1+)     Leuk Esterase, UA Small (1+)     Nitrite, UA Positive     Urobilinogen, UA 0.2 E.U./dL    Blood Culture With LILIANA - Blood, Hand, Left [[id number]]  (Normal) Collected: 07/22/24 1337    Specimen: Blood from Hand, Left Updated: 07/24/24 1400     Blood Culture No growth at 2 days    Blood Culture With LILIANA - Blood, Arm, Left [[id number]]  (Normal) Collected: 07/22/24 1337    Specimen: Blood from Arm, Left Updated: 07/24/24 1400     Blood Culture No growth at 2 days                Assessment & Plan       Bowel obstruction    Coronary artery disease involving native coronary artery of native heart without angina pectoris    COPD (chronic obstructive pulmonary disease)    Impaired mobility and ADLs    Enteritis    Diverticulitis    Elevated troponin    Patient with diverticulitis and having bowel movements and no nausea or vomiting with improved symptoms.  No surgical findings on examination.  Continue antibiotics.  I will advance diet.    Medically no evidence of obstruction    Gabe Bowden MD  07/25/24  13:03 EDT

## 2024-07-25 NOTE — PLAN OF CARE
Goal Outcome Evaluation:  Plan of Care Reviewed With: patient       Patient is resting comfortably in bed, no acute distress noted, no c/o voiced. Will continue to monitor

## 2024-07-25 NOTE — DISCHARGE PLACEMENT REQUEST
"STR Referral     Octavia Aixa Wells (79 y.o. Female)       Date of Birth   1945    Social Security Number       Address   [address] [place] [address]    Home Phone   [phone]    MRN   [id number]       Alevism   Hindu    Marital Status                               Admission Date   7/22/24    Admission Type   Emergency    Admitting Provider   Milton Urban DO    Attending Provider   Milton Urban DO    Department, Room/Bed   [facility] TELEMETRY 3, 325/1       Discharge Date       Discharge Disposition       Discharge Destination                                 Attending Provider: Milton Urban DO    Allergies: Ativan [Lorazepam], Doxycycline    Isolation: None   Infection: None   Code Status: CPR    Ht: 165.1 cm (65\")   Wt: 68 kg (149 lb 14.6 oz)    Admission Cmt: None   Principal Problem: Bowel obstruction [K56.609]                   Active Insurance as of 7/22/2024       Primary Coverage       Payor Plan Insurance Group Employer/Plan Group    MEDICARE MEDICARE A & B        Payor Plan Address Payor Plan Phone Number Payor Plan Fax Number Effective Dates    [address] [phone]  2/1/2012 - None Entered    [facility] [id number]         Subscriber Name Subscriber Birth Date Member ID       AIXA ERIC 1945 [id number]               Secondary Coverage       Payor Plan Insurance Group Employer/Plan Group    AARP MC SUP AARP HEALTH CARE OPTIONS        Payor Plan Address Payor Plan Phone Number Payor Plan Fax Number Effective Dates    [facility] [phone]  1/1/2016 - None Entered    [address]       [facility] [address]         Subscriber Name Subscriber Birth Date Member ID       AIXA ERIC 1945 [id number]                     Emergency Contacts        (Rel.) Home Phone Work Phone Mobile Phone    Malu Aguilar (Daughter) [phone] -- [phone]                 History & Physical        Milton Urban " DO Albert at 24 1627            [facility]   HISTORY AND PHYSICAL      Name:  Gabi Dudley   Age:  79 y.o.  Sex:  female  :  1945  MRN:  [id number]   Visit Number:  [id number]  Admission Date:  2024  Date Of Service:  24  Primary Care Physician:  Krystina Saunders DO    Chief Complaint:     Abdominal discomfort    History Of Presenting Illness:      79 female PMHx: Atrial fibrillation, anemia, anxiety and depression, CAD, COPD on 4L, hemorrhoids, gastritis, bowel obstruction twice surgically treated adhesiolysis reduction of volvulus,  also with history of partial bowellresection. Chronic history of constipation due to opiates. Has been doing laxatives, miralax, enemas. Last night had hard bowel movement. Has developed severe nausea and dry heaves. They were concerned her abdominal pain and nausea might be suggestive of obstruction. CT concerning for partial mild obstruction. Dr. Isaac to consult. Had some chest pain overnight but much milder as compared to her abdominal symptoms. Also elevated troponin Dr. Curry to follow.  Full code.    Pertinent findings: HS trop 289 and then 259, WBC 13.72, CT abdomen enteritis/diverticultis, mild obstruction, worsened L3 compression fx, NSR p pulmonale and mitrale no ischemic changes    ED Medications:    Medications   sodium chloride 0.9 % flush 10 mL (has no administration in time range)   sodium chloride 0.9 % bolus 1,000 mL (0 mL Intravenous Stopped 24 1257)   ondansetron (ZOFRAN) injection 4 mg (4 mg Intravenous Given 24 1024)   morphine injection 4 mg (4 mg Intravenous Given 24 1024)   morphine injection 4 mg (4 mg Intravenous Given 24 1109)   iopamidol (ISOVUE-300) 61 % injection 100 mL (100 mL Intravenous Given 24 1206)   Vancomycin HCl 1,250 mg in sodium chloride 0.9 % 250 mL VTB (1,250 mg Intravenous New Bag 24 1419)   piperacillin-tazobactam (ZOSYN) IVPB 3.375 g IVPB in 100 mL NS  (VTB) (3.375 g Intravenous New Bag 7/22/24 1416)   Morphine sulfate (PF) injection 2 mg (2 mg Intravenous Given 7/22/24 1536)       Edited by: Milton Urban DO at 7/22/2024 1624     Review Of Systems:    All systems were reviewed and negative except as mentioned in history of presenting illness, assessment and plan.    Past Medical History: Patient  has a past medical history of Adrenal adenoma, Anemia, Arrhythmia, Asthma, Atrial fibrillation, Back pain, Benign colonic polyp, Benign tumor of adrenal gland, Cataract, Cholelithiasis, Chronic bronchitis, Chronic bronchitis with COPD (chronic obstructive pulmonary disease), COPD (chronic obstructive pulmonary disease) (2005), Coronary artery disease, Depression, Diverticulosis (Years ago), Elevated cholesterol, Environmental and seasonal allergies, Fibromyalgia, Fibromyalgia, primary (Sometime in the 90s), Gastritis, Generalized anxiety disorder, GERD (gastroesophageal reflux disease), H/O mammogram, Headache (Years ago), Hemorrhoids, History of blood transfusion (1985), History of blood transfusion (1985), History of echocardiogram, History of endometriosis, History of nuclear stress test, Hospitalization or health care facility admission within last 6 months, Hypertension, IBS (irritable bowel syndrome), Impaired functional mobility, balance, gait, and endurance, Impaired mobility, Inverted nipple, Kidney disease, Kidney stone, Liver cyst, Low back pain (Years ago), Nodular radiologic density, Nodule of left lung, On home oxygen therapy, Osteoarthritis, Osteopenia (Several years ago), Osteoporosis, PONV (postoperative nausea and vomiting), Renal cyst, Sinus problem, Sinusitis, Skin cancer, SOB (shortness of breath), Tobacco use, Urinary frequency, Urinary tract infection (Years ago), Vitamin D deficiency, Wears glasses, and Wears partial dentures.    Past Surgical History: Patient  has a past surgical history that includes Appendectomy (1980s); Tonsillectomy  (1987); Colonoscopy w/ biopsies and polypectomy; Colonoscopy (03/11/2013); Colonoscopy (06/21/2016); Upper gastrointestinal endoscopy (01/13/2015); Vaginal delivery; Colonoscopy (N/A, 07/24/2019); Cataract extraction (2013); Hysterectomy (1980s); Cardiac catheterization (2003); Abdominal adhesion surgery; cholecystectomy with intraoperative cholangiogram (N/A, 10/30/2020); Exploratory Laparotomy (N/A, 11/23/2020); Cholecystectomy (2020); Colon surgery (2020); and Exploratory Laparotomy (N/A, 8/9/2023).    Social History: Patient  reports that she quit smoking about 3 years ago. Her smoking use included cigarettes. She started smoking about 33 years ago. She has a 7.5 pack-year smoking history. She has been exposed to tobacco smoke. She has never used smokeless tobacco. She reports that she does not drink alcohol and does not use drugs.    Family History:  Patient's family history has been reviewed and found to be noncontributory.     Allergies:      Ativan [lorazepam] and Doxycycline    Home Medications:    Prior to Admission Medications       Prescriptions Last Dose Informant Patient Reported? Taking?    albuterol sulfate  (90 Base) MCG/ACT inhaler   No No    Inhale 2 puffs Every 4 (Four) Hours As Needed for Wheezing.    amiodarone (PACERONE) 200 MG tablet   No No    Take 1 tablet by mouth Daily.    apixaban (Eliquis) 5 MG tablet tablet   No No    Take 1 tablet by mouth Every 12 (Twelve) Hours.    atorvastatin (LIPITOR) 20 MG tablet   No No    Take 1 tablet by mouth Daily.    Budeson-Glycopyrrol-Formoterol (Breztri Aerosphere) 160-9-4.8 MCG/ACT aerosol inhaler   No No    Inhale 2 puffs 2 (Two) Times a Day. Rinse mouth out after use    cetirizine (zyrTEC) 10 MG tablet   No No    TAKE 1 TABLET BY MOUTH DAILY.    Cholecalciferol (vitamin D3) 125 MCG (5000 UT) capsule capsule  Self Yes No    Take 1 capsule by mouth Daily.    clonazePAM (KlonoPIN) 1 MG tablet   No No    TAKE 1 TABLET BY MOUTH 2 (TWO) TIMES A DAY  AS NEEDED FOR ANXIETY.    clotrimazole-betamethasone (Lotrisone) 1-0.05 % cream   No No    Apply 1 Application topically to the appropriate area as directed 2 (Two) Times a Day.    cyclobenzaprine (FLEXERIL) 5 MG tablet   No No    Take 1 tablet by mouth 3 (Three) Times a Day As Needed for Muscle Spasms.    Diclofenac Sodium (VOLTAREN) 1 % gel gel   No No    Apply 4 g topically to the appropriate area as directed 4 (Four) Times a Day As Needed (pain).    dilTIAZem CD (CARDIZEM CD) 180 MG 24 hr capsule   No No    Take 1 capsule by mouth Daily.    DULoxetine (CYMBALTA) 60 MG capsule   No No    TAKE 1 CAPSULE BY MOUTH 2 (TWO) TIMES A DAY.    ferrous sulfate 324 (65 Fe) MG tablet delayed-release EC tablet   No No    Take 1 tablet by mouth Daily With Breakfast.    fluconazole (Diflucan) 100 MG tablet   No No    Take 1 tablet by mouth Daily.    Patient not taking:  Reported on 6/5/2024    fluticasone (FLONASE) 50 MCG/ACT nasal spray  Self, Child Yes No    2 sprays into the nostril(s) as directed by provider Daily.    furosemide (Lasix) 40 MG tablet   No No    Take 1 tablet by mouth Daily for 3 days.    ipratropium-albuterol (DUO-NEB) 0.5-2.5 mg/3 ml nebulizer   No No    USE 3 mL VIA NEBULIZER EVERY 4 HOURS AS NEEDED FOR WHEEZING    methocarbamol (ROBAXIN) 750 MG tablet   Yes No    Take 1 tablet by mouth 4 (Four) Times a Day.    O2 (OXYGEN)   Yes No    Inhale 2 L/min As Needed.    ondansetron ODT (ZOFRAN-ODT) 4 MG disintegrating tablet   No No    Place 1 tablet on the tongue Every 8 (Eight) Hours As Needed for Vomiting or Nausea.    pantoprazole (PROTONIX) 40 MG EC tablet   No No    TAKE 1 TABLET BY MOUTH DAILY    potassium chloride 10 MEQ CR tablet   Yes No    1 tablet.    predniSONE (DELTASONE) 20 MG tablet   No No    Take 1 tablet by mouth 2 (Two) Times a Day.    QUEtiapine (SEROquel) 25 MG tablet   No No    Take 0.5 tablets by mouth Every Night.    sertraline (ZOLOFT) 100 MG tablet   No No    TAKE 1 & 1/2 TABLETS BY  "MOUTH DAILY    sodium chloride 0.65 % nasal spray   No No    2 sprays into the nostril(s) as directed by provider As Needed (dry nose).    traZODone (DESYREL) 100 MG tablet   No No    TAKE 1 TABLET EVERY NIGHT AT BEDTIME BY MOUTH AS NEEDED              Vital Signs:  Temp:  [98.4 °F (36.9 °C)-98.8 °F (37.1 °C)] 98.4 °F (36.9 °C)  Heart Rate:  [88-96] 92  Resp:  [18-20] 18  BP: (103-150)/(77-99) 138/92        07/22/24  0941   Weight: 68 kg (149 lb 14.6 oz)     Body mass index is 24.95 kg/m².    Physical Exam:     Most recent vital Signs: /92 (BP Location: Right arm, Patient Position: Lying)   Pulse 92   Temp 98.4 °F (36.9 °C) (Oral)   Resp 18   Ht 165.1 cm (65\")   Wt 68 kg (149 lb 14.6 oz)   SpO2 98%   BMI 24.95 kg/m²     Constitutional: Awake, alert  Eyes: PERRLA, sclerae anicteric, no conjunctival injection  HENT: NCAT, mucous membranes moist  Neck: Supple, no thyromegaly, no lymphadenopathy, trachea midline  Respiratory:diminished bilaterally, nonlabored respirations   Cardiovascular: RRR, no murmurs, rubs, or gallops, palpable pedal pulses bilaterally  Gastrointestinal: Positive bowel sounds, soft, diffusely tender and mildly distended, no peritonitis  Musculoskeletal: No bilateral ankle edema, no clubbing or cyanosis to extremities  Psychiatric: Appropriate affect, cooperative  Neurologic: Oriented x 3, speech clear  Skin: scattered ecchymosis  Edited by: Milton Urban DO at 7/22/2024 1614      Laboratory data:    I have reviewed the labs done in the emergency room.    Results from last 7 days   Lab Units 07/22/24  0929   SODIUM mmol/L 142   POTASSIUM mmol/L 3.5   CHLORIDE mmol/L 96*   CO2 mmol/L 36.4*   BUN mg/dL 17   CREATININE mg/dL 0.65   CALCIUM mg/dL 8.7   BILIRUBIN mg/dL 0.2   ALK PHOS U/L 192*   ALT (SGPT) U/L 18   AST (SGOT) U/L 37*   GLUCOSE mg/dL 140*     Results from last 7 days   Lab Units 07/22/24  0929   WBC 10*3/mm3 13.72*   HEMOGLOBIN g/dL 11.4*   HEMATOCRIT % 37.4   PLATELETS " "10*3/mm3 262         Results from last 7 days   Lab Units 07/22/24  1124 07/22/24  0929   HSTROP T ng/L 259* 289*             Results from last 7 days   Lab Units 07/22/24  0929   LIPASE U/L 10*               Invalid input(s): \"USDES\", \"NITRITITE\", \"BACT\", \"EP\"    Pain Management Panel          Latest Ref Rng & Units 8/11/2023   Pain Management Panel   Creatinine, Urine mg/dL 105.2        EKG:      NSR no ischemia biatrial abnormality    Radiology:    CT Abdomen Pelvis With Contrast    Result Date: 7/22/2024  PROCEDURE: CT ABDOMEN PELVIS W CONTRAST-  HISTORY:  concern for bowel obstruction, h/o multiple bowel obstructions in the past  COMPARISON: June 13, 2024.  TECHNIQUE: Multiple axial CT images were obtained from the lung bases through the pubic symphysis following the administration of Isovue 300 and oral contrast.  FINDINGS:  ABDOMEN: The lung bases are clear. The heart is normal in size. The liver shows a stable hypodense hepatic cyst in the subdiaphragmatic left lobe. There is mild intra and extrahepatic biliary ductal dilatation with cholecystectomy clips. Findings are stable. Stable bilateral well-circumscribed hypodense renal lesions are seen compatible with cysts. 1 of these shows rim calcification which is stable. Vascular calcifications are present. There is no aortic aneurysm. There is mild dilation of the pancreatic duct which is stable. There is stable appearance of the midline diastases of the abdominal wall containing colon. There is a moderate stool burden of the ascending and proximal transverse colon to the level of the diastases. There are decompressed distal bowel loops to this. Findings may represent some level of obstruction. There is wall thickening of jejunal loops in the left abdomen possibly due to enteritis. The spleen is unremarkable. No adrenal mass is present.  The pancreas is normal. The aorta is normal in caliber. There is a severe compression fracture of L3 which appeared mild on " the previous exam.  PELVIS: The appendix is not identified. There is colonic diverticulosis. There is new mild wall thickening of the proximal sigmoid colon and mild infiltration of the adjacent surrounding fat consistent with diverticulitis. The urinary bladder is unremarkable. There is a minimal amount of free fluid in the pelvis. No rim-enhancing fluid collection is identified. There is an ileocolic anastomosis in the right lower quadrant. The appendix is surgically absent.      1. Sigmoid diverticulitis. 2. Partial mild obstruction of the transverse colon secondary to abdominal wall diastases. 3. Significant worsening of L3 compression fracture. 4. Jejunal wall thickening may be due to possible enteritis.   CTDI: 5.29 mGy DLP: 249.1 mGy.cm   This study was performed with techniques to keep radiation doses as low as reasonably achievable (ALARA). Individualized dose reduction techniques using automated exposure control or adjustment of mA and/or kV according to the patient size were employed.      Images were reviewed, interpreted, and dictated by Dr. Angeles Collins MD Transcribed by Reyna Gerard PA-C.  This report was signed and finalized on 7/22/2024 1:27 PM by Angeles Collins MD.       Assessment/Plan:      Bowel obstruction    COPD (chronic obstructive pulmonary disease)    Coronary artery disease involving native coronary artery of native heart without angina pectoris    Impaired mobility and ADLs    Enteritis    Diverticulitis    Elevated troponin  -- suspect abdominal discomfort due to acute inflammation in the bowels and diastasis. Will continue to follow with general surgery. Continue antibiotics. Serial abdominal exams. Suspect troponin elevation due to GI distress. Is scheduled for epidural on Thursday with Dr. Kapadia so if she's still here please let him know.  -- Active Treatments: Rocephin Flagyl, bowel regimen, IV fluids, laxatives,   -- Pending Results: am labs, Cardiology recommendations,  PT/OT        DVT Prophylaxis: Eliquis  Code Status: Full   Diet:  NPO sips chips  Discharge Plan: anticipate 2 to 3 days admission and then home with home health        Edited by: Milton Urban DO at 7/22/2024 1623           Risk Assessment: high  DVT Prophylaxis: eliquis  Code Status:   Code Status and Medical Interventions:   Ordered at: 07/22/24 1625     Code Status (Patient has no pulse and is not breathing):    CPR (Attempt to Resuscitate)     Medical Interventions (Patient has pulse or is breathing):    Full Support      Diet:   Dietary Orders (From admission, onward)       Start     Ordered    07/22/24 1622  NPO Diet NPO Type: Strict NPO, Sips with Meds, Ice Chips  Diet Effective Now        Question Answer Comment   NPO Type Strict NPO    NPO Type Sips with Meds    NPO Type Ice Chips        07/22/24 1625                     Advance Care Planning  ACP discussion was held with the patient during this visit. Patient does not have an advance directive, declines further assistance.           Milton Urban DO  07/22/24  16:27 EDT    Dictated utilizing Dragon dictation.      Electronically signed by Milton Urban DO at 07/22/24 1629       Current Facility-Administered Medications   Medication Dose Route Frequency Provider Last Rate Last Admin    acetaminophen (TYLENOL) tablet 650 mg  650 mg Oral Q4H PRN Milton Urban DO   650 mg at 07/25/24 0129    albuterol (PROVENTIL) nebulizer solution 0.083% 2.5 mg/3mL  2.5 mg Nebulization Q6H PRN Milton Urban DO        amiodarone (PACERONE) tablet 200 mg  200 mg Oral Q24H Milton Urban DO   200 mg at 07/25/24 0956    apixaban (ELIQUIS) tablet 5 mg  5 mg Oral BID Milton Urban DO   5 mg at 07/25/24 0956    atorvastatin (LIPITOR) tablet 40 mg  40 mg Oral Daily Britt Nascimento APRN   40 mg at 07/25/24 0955    sennosides-docusate (PERICOLACE) 8.6-50 MG per tablet 2 tablet  2 tablet Oral BID Milton Urban DO   2 tablet at  07/25/24 0956    And    polyethylene glycol (MIRALAX) packet 17 g  17 g Oral Daily PRN Milton Urban, DO   17 g at 07/25/24 0352    And    bisacodyl (DULCOLAX) EC tablet 5 mg  5 mg Oral Daily PRN Milton Urban, DO        And    bisacodyl (DULCOLAX) suppository 10 mg  10 mg Rectal Daily PRN Milton Urban, DO        budesonide (PULMICORT) nebulizer solution 0.5 mg  0.5 mg Nebulization BID - RT Milton Urban, DO   0.5 mg at 07/25/24 0712    calcium carbonate (TUMS) chewable tablet 500 mg (200 mg elemental)  2 tablet Oral BID PRN Milton Urban DO        cefTRIAXone (ROCEPHIN) 2,000 mg in sodium chloride 0.9 % 100 mL IVPB-VTB  2,000 mg Intravenous Q24H Azam, Britt CONLEY, APRN 200 mL/hr at 07/24/24 1733 2,000 mg at 07/24/24 1733    cetirizine (zyrTEC) tablet 10 mg  10 mg Oral Daily Milton Urban, DO   10 mg at 07/25/24 0956    clonazePAM (KlonoPIN) tablet 1 mg  1 mg Oral BID PRN Milton Urban, DO   1 mg at 07/25/24 0456    clotrimazole-betamethasone (LOTRISONE) 1-0.05 % cream 1 Application  1 application  Topical BID Milton Urban, DO   1 Application at 07/25/24 0955    cyclobenzaprine (FLEXERIL) tablet 5 mg  5 mg Oral TID PRN Milton Urban, DO   5 mg at 07/24/24 0459    Diclofenac Sodium (VOLTAREN) 1 % gel 4 g  4 g Topical 4x Daily PRN Milton Urban, DO        DULoxetine (CYMBALTA) DR capsule 60 mg  60 mg Oral BID Milton Urban, DO   60 mg at 07/25/24 0956    ferrous sulfate EC tablet 324 mg  324 mg Oral Daily With Breakfast Milton Urban, DO   324 mg at 07/25/24 0956    fluticasone (FLONASE) 50 MCG/ACT nasal spray 2 spray  2 spray Nasal Daily Milton Urban DO   2 spray at 07/25/24 0955    ipratropium-albuterol (DUO-NEB) nebulizer solution 3 mL  3 mL Nebulization Q6H While Awake - RT Milton Urban DO   3 mL at 07/25/24 1244    melatonin tablet 5 mg  5 mg Oral Nightly Milton Urban DO   5 mg at 07/24/24 2043    metoprolol  succinate XL (TOPROL-XL) 24 hr tablet 25 mg  25 mg Oral Q24H Britt Nascimento APRN   25 mg at 07/25/24 0955    metroNIDAZOLE (FLAGYL) IVPB 500 mg  500 mg Intravenous Q8H Milton Urban,  mL/hr at 07/25/24 0954 500 mg at 07/25/24 0954    nitroglycerin (NITROSTAT) SL tablet 0.4 mg  0.4 mg Sublingual Q5 Min PRN Milton Urban, DO        ondansetron ODT (ZOFRAN-ODT) disintegrating tablet 4 mg  4 mg Oral Q6H PRN Milton Urban, DO        Or    ondansetron (ZOFRAN) injection 4 mg  4 mg Intravenous Q6H PRN Milton Urban, DO   4 mg at 07/24/24 0459    ondansetron ODT (ZOFRAN-ODT) disintegrating tablet 4 mg  4 mg Translingual Q8H PRN Milton Urban, DO   4 mg at 07/23/24 2014    oxyCODONE-acetaminophen (PERCOCET) 7.5-325 MG per tablet 1 tablet  1 tablet Oral Q4H PRN Milton Urban, DO   1 tablet at 07/25/24 0352    pantoprazole (PROTONIX) EC tablet 40 mg  40 mg Oral Daily Milton Urban, DO   40 mg at 07/25/24 0956    Potassium Replacement - Follow Nurse / BPA Driven Protocol   Does not apply PRN Britt Nascimento, APRN        QUEtiapine (SEROquel) tablet 12.5 mg  12.5 mg Oral Nightly Milton Urban, DO   12.5 mg at 07/24/24 2043    sertraline (ZOLOFT) tablet 150 mg  150 mg Oral Daily Milton Urban, DO   150 mg at 07/25/24 0955    sodium chloride 0.9 % flush 10 mL  10 mL Intravenous PRN Milton Urban, DO        sodium chloride 0.9 % flush 10 mL  10 mL Intravenous Q12H Milton Urban, DO   10 mL at 07/25/24 0954    sodium chloride 0.9 % flush 10 mL  10 mL Intravenous PRN Milton Urban, DO   10 mL at 07/24/24 0459    sodium chloride 0.9 % infusion 40 mL  40 mL Intravenous PRN Milton Urban,         sodium chloride nasal spray 2 spray  2 spray Nasal PRN Milton Urban,         traZODone (DESYREL) tablet 100 mg  100 mg Oral Nightly PRN Milton Urban DO        vitamin D3 capsule 5,000 Units  5,000 Units Oral Daily Milton Urban, DO    5,000 Units at 24 0955        Physical Therapy Notes (most recent note)        Farzad Crowder, PT at 24 1258  Version 1 of 1         Patient Name: Gabi Dudley  : 1945    MRN: [id number]                              Today's Date: 2024       Admit Date: 2024    Visit Dx:     ICD-10-CM ICD-9-CM   1. NSTEMI (non-ST elevated myocardial infarction)  I21.4 410.70   2. Diverticulitis  K57.92 562.11   3. Diastasis of rectus abdominis  M62.08 728.84   4. Abdominal pain, unspecified abdominal location  R10.9 789.00   5. Nausea and vomiting, unspecified vomiting type  R11.2 787.01     Patient Active Problem List   Diagnosis    Adrenal adenoma    Anxiety    Chronic fatigue    Fibromyalgia    Hyperlipidemia    Essential hypertension    Insomnia    Osteoarthritis of knee    Osteoporosis    Pulmonary emphysema    Simple renal cyst    Tension headache    Vitamin D deficiency    Diverticulosis    Inversion of nipple    Paroxysmal atrial fibrillation    Coronary artery disease involving native coronary artery of native heart without angina pectoris    Autonomic dysfunction    Gastroesophageal reflux disease without esophagitis    Urge incontinence    Erythrasma    Compression fracture of T5 vertebra    Lung nodule    COPD (chronic obstructive pulmonary disease)    Overweight (BMI 25.0-29.9)    Acute on chronic respiratory failure with hypoxia    Acute on chronic respiratory failure with hypoxia and hypercapnia    Iron deficiency anemia    Impaired mobility and ADLs    Acute respiratory failure with hypoxia and hypercapnia    Aspiration pneumonia of right lower lobe    Partial small bowel obstruction    Moderate malnutrition    Bowel obstruction    Enteritis    Diverticulitis    Elevated troponin     Past Medical History:   Diagnosis Date    Adrenal adenoma     Anemia     Arrhythmia     Asthma     Atrial fibrillation     Back pain     Benign colonic polyp     Benign tumor of adrenal gland     Cataract      bilateral    Cholelithiasis     Chronic bronchitis     Chronic bronchitis with COPD (chronic obstructive pulmonary disease)     COPD (chronic obstructive pulmonary disease) 2005    Coronary artery disease     Depression     Diverticulosis Years ago    Elevated cholesterol     Environmental and seasonal allergies     Fibromyalgia     Fibromyalgia, primary Sometime in the 90s    Gastritis     Generalized anxiety disorder     GERD (gastroesophageal reflux disease)     H/O mammogram     Headache Years ago    Hemorrhoids     History of blood transfusion 1985    History of blood transfusion 1985    History of echocardiogram     History of endometriosis     History of nuclear stress test     Hospitalization or health care facility admission within last 6 months     5 times between 11/2022-05/2023    Hypertension     IBS (irritable bowel syndrome)     Impaired functional mobility, balance, gait, and endurance     Impaired mobility     Inverted nipple     Kidney disease     Kidney stone     Liver cyst     Low back pain Years ago    Nodular radiologic density     Nodule of left lung     On home oxygen therapy     2 liters NC QHS    Osteoarthritis     Osteopenia Several years ago    Osteoporosis     PONV (postoperative nausea and vomiting)     Renal cyst     Sinus problem     2014    Sinusitis     Skin cancer     basal cell carcinoma    SOB (shortness of breath)     Tobacco use     Urinary frequency     Urinary tract infection Years ago    Frequent UTIs    Vitamin D deficiency     Wears glasses     Wears partial dentures     upper plate     Past Surgical History:   Procedure Laterality Date    APPENDECTOMY  1980s    CARDIAC CATHETERIZATION  2003    CATARACT EXTRACTION  2013    both eyes    CHOLECYSTECTOMY  2020    CHOLECYSTECTOMY WITH INTRAOPERATIVE CHOLANGIOGRAM N/A 10/30/2020    Procedure: CHOLECYSTECTOMY LAPAROSCOPIC INTRAOPERATIVE CHOLANGIOGRAPHY;  Surgeon: Sima Moses MD;  Location: [facility];  Service: General;   Laterality: N/A;    COLON SURGERY  2020    COLONOSCOPY  03/11/2013    COLONOSCOPY  06/21/2016    COLONOSCOPY N/A 07/24/2019    Procedure: COLONOSCOPY W/ COLD FORCEP POLYPECTOMIES; HOT SNARE POLYPECTOMIES; COLD SNARE POLYPECTOMY;  Surgeon: Goyo Nunez MD;  Location: [facility] ENDOSCOPY;  Service: Gastroenterology    COLONOSCOPY W/ BIOPSIES AND POLYPECTOMY      EXPLORATORY LAPAROTOMY N/A 11/23/2020    Procedure: colectomy, right, closure of enterotomy x 2, reduction of internal volvulus;  Surgeon: Sima Moses MD;  Location: [facility] OR;  Service: General;  Laterality: N/A;    EXPLORATORY LAPAROTOMY N/A 8/9/2023    Procedure: LAPAROTOMY EXPLORATORY WITH LYIS OF ADHESIONS AND  CENTRAL LINE INSERTION;  Surgeon: Bianca Isaac DO;  Location: [facility] OR;  Service: General;  Laterality: N/A;    HYSTERECTOMY  1980s    partial    LYSIS OF ABDOMINAL ADHESIONS      TONSILLECTOMY  1987    UPPER GASTROINTESTINAL ENDOSCOPY  01/13/2015    VAGINAL DELIVERY      x2      General Information       Row Name 07/25/24 1238          Physical Therapy Time and Intention    Document Type evaluation  -MS     Mode of Treatment physical therapy  -MS       Row Name 07/25/24 1238          General Information    Patient Profile Reviewed yes  -MS     Prior Level of Function independent:;all household mobility;community mobility  -MS     Barriers to Rehab medically complex;previous functional deficit  -MS       Row Name 07/25/24 1238          Living Environment    People in Home child(ashely), adult  -MS       Row Name 07/25/24 1238          Home Main Entrance    Number of Stairs, Main Entrance none  -MS       Row Name 07/25/24 1238          Cognition    Orientation Status (Cognition) oriented x 4  -MS       Row Name 07/25/24 1238          Safety Issues, Functional Mobility    Safety Issues Affecting Function (Mobility) insight into deficits/self-awareness;safety precautions follow-through/compliance;positioning of assistive device;sequencing  abilities;awareness of need for assistance;safety precaution awareness  -MS     Impairments Affecting Function (Mobility) balance;endurance/activity tolerance;shortness of breath;strength;pain  -MS               User Key  (r) = Recorded By, (t) = Taken By, (c) = Cosigned By      Initials Name Provider Type    MS Farzad Crowder, PT Physical Therapist                   Mobility       Row Name 07/25/24 1239          Bed Mobility    Bed Mobility rolling left  -MS     Rolling Left [place] (Bed Mobility) standby assist  -MS     Assistive Device (Bed Mobility) bed rails;draw sheet;head of bed elevated  -MS       Row Name 07/25/24 1239          Sit-Stand Transfer    Sit-Stand [place] (Transfers) not tested  -MS       Row Name 07/25/24 1239          Gait/Stairs (Locomotion)    [place] Level (Gait) not tested  -MS               User Key  (r) = Recorded By, (t) = Taken By, (c) = Cosigned By      Initials Name Provider Type    MS Farzda Crowder, PT Physical Therapist                   Obj/Interventions       Row Name 07/25/24 1241          Range of Motion Comprehensive    General Range of Motion bilateral lower extremity ROM WFL  -MS       Row Name 07/25/24 1241          Strength Comprehensive (MMT)    General Manual Muscle Testing (MMT) Assessment lower extremity strength deficits identified  -MS     Comment, General Manual Muscle Testing (MMT) Assessment Pt declined MMT due to pain  -MS               User Key  (r) = Recorded By, (t) = Taken By, (c) = Cosigned By      Initials Name Provider Type    Farzad Arndt, PT Physical Therapist                   Goals/Plan       Row Name 07/25/24 1254          Bed Mobility Goal 1 (PT)    Activity/Assistive Device (Bed Mobility Goal 1, PT) bed mobility activities, all  -MS     [place] Level/Cues Needed (Bed Mobility Goal 1, PT) standby assist  -MS     Time Frame (Bed Mobility Goal 1, PT) long term goal (LTG);2 weeks  -MS       Row Name 07/25/24 1258           Transfer Goal 1 (PT)    Activity/Assistive Device (Transfer Goal 1, PT) transfers, all;sit-to-stand/stand-to-sit  -MS     [place] Level/Cues Needed (Transfer Goal 1, PT) standby assist  -MS     Time Frame (Transfer Goal 1, PT) long term goal (LTG);2 weeks  -MS       Row Name 07/25/24 1254          Gait Training Goal 1 (PT)    Activity/Assistive Device (Gait Training Goal 1, PT) gait (walking locomotion);assistive device use  -MS     [place] Level (Gait Training Goal 1, PT) contact guard required  -MS     Distance (Gait Training Goal 1, PT) 10ft  -MS     Time Frame (Gait Training Goal 1, PT) long term goal (LTG);2 weeks  -MS       Row Name 07/25/24 1254          Patient Education Goal (PT)    Activity (Patient Education Goal, PT) Pt verbalizes understanding of importance of mobility and participation in PT tx  -MS     [place]/Cues/Accuracy (Memory Goal 2, PT) verbalizes understanding  -MS     Time Frame (Patient Education Goal, PT) short term goal (STG);2 weeks;1 week  -MS       Row Name 07/25/24 1254          Therapy Assessment/Plan (PT)    Planned Therapy Interventions (PT) balance training;bed mobility training;gait training;home exercise program;stair training;strengthening;ROM (range of motion);neuromuscular re-education;transfer training;patient/family education  -MS               User Key  (r) = Recorded By, (t) = Taken By, (c) = Cosigned By      Initials Name Provider Type    Farzad Arndt, PT Physical Therapist                   Clinical Impression       Row Name 07/25/24 1242          Pain    Pretreatment Pain Rating 10/10  -MS     Posttreatment Pain Rating 10/10  -MS     Pain Location generalized  -MS     Pain Intervention(s) Repositioned  -MS       Row Name 07/25/24 1242          Plan of Care Review    Plan of Care Reviewed With patient;daughter  -MS     Progress no change  -MS     Outcome Evaluation Pt participated in PT eval this date. Pt was independent with gait with rollator  prior to June, in June she had a lumbar compression fx, and since then her daughter states she has mainly just been transferring to a w/c.  Pt states that she is in alot of pain all over and is unable to do much. States she is uncomfortable and would like to be on her side to see if she can relieve her pain. Pt was SBA to roll to the L. Declined further OOB mobility due to pain. Pt states she would like to go home, but did not decline swing bed option when her daughter discussed it. Pt is expected to benefit from skilled PT tx during this inpatient stay to address deficits in strength, transfers and mobility.  -MS       Row Name 07/25/24 1242          Therapy Assessment/Plan (PT)    Rehab Potential (PT) good, to achieve stated therapy goals  -MS     Criteria for Skilled Interventions Met (PT) yes;meets criteria  -MS     Therapy Frequency (PT) 3 times/wk  -MS       Row Name 07/25/24 1242          Vital Signs    O2 Delivery Pre Treatment supplemental O2  -MS     O2 Delivery Intra Treatment supplemental O2  -MS     O2 Delivery Post Treatment supplemental O2  -MS     Pre Patient Position Supine  -MS     Intra Patient Position Side Lying  -MS     Post Patient Position Side Lying  -MS       Row Name 07/25/24 1242          Positioning and Restraints    Pre-Treatment Position in bed  -MS     Post Treatment Position bed  -MS     In Bed side lying left;call light within reach;encouraged to call for assist;patient within staff view  unable to set bed alarm as Pt was not centered due to being on her L side in the bed and Pt declined being moved further  -MS               User Key  (r) = Recorded By, (t) = Taken By, (c) = Cosigned By      Initials Name Provider Type    Farzad Arndt, PT Physical Therapist                   Outcome Measures       Row Name 07/25/24 1257          How much help from another person do you currently need...    Turning from your back to your side while in flat bed without using bedrails? 4  -MS      Moving from lying on back to sitting on the side of a flat bed without bedrails? 4  -MS     Moving to and from a bed to a chair (including a wheelchair)? 3  -MS     Standing up from a chair using your arms (e.g., wheelchair, bedside chair)? 3  -MS     Climbing 3-5 steps with a railing? 1  -MS     To walk in hospital room? 2  -MS     AM-PAC 6 Clicks Score (PT) 17  -MS     Highest Level of Mobility Goal 5 --> Static standing  -MS               User Key  (r) = Recorded By, (t) = Taken By, (c) = Cosigned By      Initials Name Provider Type    MS Farzad Crowder PT Physical Therapist                                 Physical Therapy Education       Title: PT OT SLP Therapies (In Progress)       Topic: Physical Therapy (In Progress)       Point: Mobility training (Done)       Learning Progress Summary             Patient Acceptance, E, VU by MS at 7/25/2024 1257    Comment: importance of mobility                         Point: Home exercise program (Done)       Learning Progress Summary             Patient Acceptance, E, VU by MS at 7/25/2024 1257    Comment: importance of mobility                         Point: Body mechanics (Not Started)       Learner Progress:  Not documented in this visit.              Point: Precautions (Not Started)       Learner Progress:  Not documented in this visit.                              User Key       Initials Effective Dates Name Provider Type Discipline    MS 08/22/23 -  Farzad Crowder PT Physical Therapist PT                  PT Recommendation and Plan  Planned Therapy Interventions (PT): balance training, bed mobility training, gait training, home exercise program, stair training, strengthening, ROM (range of motion), neuromuscular re-education, transfer training, patient/family education  Plan of Care Reviewed With: patient, daughter  Progress: no change  Outcome Evaluation: Pt participated in PT eval this date. Pt was independent with gait with rollator prior to June, in  June she had a lumbar compression fx, and since then her daughter states she has mainly just been transferring to a w/c.  Pt states that she is in alot of pain all over and is unable to do much. States she is uncomfortable and would like to be on her side to see if she can relieve her pain. Pt was SBA to roll to the L. Declined further OOB mobility due to pain. Pt states she would like to go home, but did not decline swing bed option when her daughter discussed it. Pt is expected to benefit from skilled PT tx during this inpatient stay to address deficits in strength, transfers and mobility.     Time Calculation:   PT Evaluation Complexity  History, PT Evaluation Complexity: 1-2 personal factors and/or comorbidities  Examination of Body Systems (PT Eval Complexity): total of 3 or more elements  Clinical Presentation (PT Evaluation Complexity): evolving  Clinical Decision Making (PT Evaluation Complexity): moderate complexity  Overall Complexity (PT Evaluation Complexity): moderate complexity     PT Charges       Row Name 07/25/24 1258             Time Calculation    Start Time 1010  -MS      PT Received On 07/25/24  -MS      PT Goal Re-Cert Due Date 08/04/24  -MS         Untimed Charges    PT Eval/Re-eval Minutes 56  -MS         Total Minutes    Untimed Charges Total Minutes 56  -MS       Total Minutes 56  -MS                User Key  (r) = Recorded By, (t) = Taken By, (c) = Cosigned By      Initials Name Provider Type    Farzad Arndt, PT Physical Therapist                  Therapy Charges for Today       Code Description Service Date Service Provider Modifiers Qty    34808880574 HC PT EVAL MOD COMPLEXITY 4 7/25/2024 Farzad Crowder, PT GP 1            PT G-Codes  AM-PAC 6 Clicks Score (PT): 17  PT Discharge Summary  Anticipated Discharge Disposition (PT): skilled nursing facility, inpatient rehabilitation facility, home with assist, home with home health    Farzad Crowder PT  7/25/2024      Electronically  signed by Farzad Crowder, PT at 24 [id number]          Occupational Therapy Notes (most recent note)        Aimee Allan, OT at 24 1321          Patient Name: Gabi Dudley  : 1945    MRN: [id number]                              Today's Date: 2024       Admit Date: 2024    Visit Dx:     ICD-10-CM ICD-9-CM   1. NSTEMI (non-ST elevated myocardial infarction)  I21.4 410.70   2. Diverticulitis  K57.92 562.11   3. Diastasis of rectus abdominis  M62.08 728.84   4. Abdominal pain, unspecified abdominal location  R10.9 789.00   5. Nausea and vomiting, unspecified vomiting type  R11.2 787.01     Patient Active Problem List   Diagnosis    Adrenal adenoma    Anxiety    Chronic fatigue    Fibromyalgia    Hyperlipidemia    Essential hypertension    Insomnia    Osteoarthritis of knee    Osteoporosis    Pulmonary emphysema    Simple renal cyst    Tension headache    Vitamin D deficiency    Diverticulosis    Inversion of nipple    Paroxysmal atrial fibrillation    Coronary artery disease involving native coronary artery of native heart without angina pectoris    Autonomic dysfunction    Gastroesophageal reflux disease without esophagitis    Urge incontinence    Erythrasma    Compression fracture of T5 vertebra    Lung nodule    COPD (chronic obstructive pulmonary disease)    Overweight (BMI 25.0-29.9)    Acute on chronic respiratory failure with hypoxia    Acute on chronic respiratory failure with hypoxia and hypercapnia    Iron deficiency anemia    Impaired mobility and ADLs    Acute respiratory failure with hypoxia and hypercapnia    Aspiration pneumonia of right lower lobe    Partial small bowel obstruction    Moderate malnutrition    Bowel obstruction    Enteritis    Diverticulitis    Elevated troponin     Past Medical History:   Diagnosis Date    Adrenal adenoma     Anemia     Arrhythmia     Asthma     Atrial fibrillation     Back pain     Benign colonic polyp     Benign tumor of adrenal gland      Cataract     bilateral    Cholelithiasis     Chronic bronchitis     Chronic bronchitis with COPD (chronic obstructive pulmonary disease)     COPD (chronic obstructive pulmonary disease) 2005    Coronary artery disease     Depression     Diverticulosis Years ago    Elevated cholesterol     Environmental and seasonal allergies     Fibromyalgia     Fibromyalgia, primary Sometime in the 90s    Gastritis     Generalized anxiety disorder     GERD (gastroesophageal reflux disease)     H/O mammogram     Headache Years ago    Hemorrhoids     History of blood transfusion 1985    History of blood transfusion 1985    History of echocardiogram     History of endometriosis     History of nuclear stress test     Hospitalization or health care facility admission within last 6 months     5 times between 11/2022-05/2023    Hypertension     IBS (irritable bowel syndrome)     Impaired functional mobility, balance, gait, and endurance     Impaired mobility     Inverted nipple     Kidney disease     Kidney stone     Liver cyst     Low back pain Years ago    Nodular radiologic density     Nodule of left lung     On home oxygen therapy     2 liters NC QHS    Osteoarthritis     Osteopenia Several years ago    Osteoporosis     PONV (postoperative nausea and vomiting)     Renal cyst     Sinus problem     2014    Sinusitis     Skin cancer     basal cell carcinoma    SOB (shortness of breath)     Tobacco use     Urinary frequency     Urinary tract infection Years ago    Frequent UTIs    Vitamin D deficiency     Wears glasses     Wears partial dentures     upper plate     Past Surgical History:   Procedure Laterality Date    APPENDECTOMY  1980s    CARDIAC CATHETERIZATION  2003    CATARACT EXTRACTION  2013    both eyes    CHOLECYSTECTOMY  2020    CHOLECYSTECTOMY WITH INTRAOPERATIVE CHOLANGIOGRAM N/A 10/30/2020    Procedure: CHOLECYSTECTOMY LAPAROSCOPIC INTRAOPERATIVE CHOLANGIOGRAPHY;  Surgeon: Sima Moses MD;  Location: [facility] OR;   Service: General;  Laterality: N/A;    COLON SURGERY  2020    COLONOSCOPY  03/11/2013    COLONOSCOPY  06/21/2016    COLONOSCOPY N/A 07/24/2019    Procedure: COLONOSCOPY W/ COLD FORCEP POLYPECTOMIES; HOT SNARE POLYPECTOMIES; COLD SNARE POLYPECTOMY;  Surgeon: Goyo Nunez MD;  Location: [facility] ENDOSCOPY;  Service: Gastroenterology    COLONOSCOPY W/ BIOPSIES AND POLYPECTOMY      EXPLORATORY LAPAROTOMY N/A 11/23/2020    Procedure: colectomy, right, closure of enterotomy x 2, reduction of internal volvulus;  Surgeon: Sima Moses MD;  Location: [facility] OR;  Service: General;  Laterality: N/A;    EXPLORATORY LAPAROTOMY N/A 8/9/2023    Procedure: LAPAROTOMY EXPLORATORY WITH LYIS OF ADHESIONS AND  CENTRAL LINE INSERTION;  Surgeon: Bianca Isaac DO;  Location: [facility] OR;  Service: General;  Laterality: N/A;    HYSTERECTOMY  1980s    partial    LYSIS OF ABDOMINAL ADHESIONS      TONSILLECTOMY  1987    UPPER GASTROINTESTINAL ENDOSCOPY  01/13/2015    VAGINAL DELIVERY      x2      General Information       Row Name 07/25/24 1301          OT Time and Intention    Document Type evaluation  -SD     Mode of Treatment occupational therapy  -SD       Row Name 07/25/24 1301          General Information    Patient Profile Reviewed yes  -SD     Prior Level of Function independent:;transfer;ADL's;min assist:;bathing  -SD     Existing Precautions/Restrictions fall;oxygen therapy device and L/min  -SD     Barriers to Rehab medically complex;previous functional deficit;ineffective coping  -SD       Row Name 07/25/24 1301          Living Environment    People in Home child(ashely), adult  -SD       Row Name 07/25/24 1301          Home Main Entrance    Number of Stairs, Main Entrance none  -SD       Row Name 07/25/24 1301          Stairs Within Home, Primary    Stairs, Within Home, Primary Split level home. Patient does not need to go up or down any steps. Den has been converted into a bedroom for patient  -SD     Number of Stairs,  Within Home, Primary none  -SD       [facility] Name 07/25/24 1301          Cognition    Orientation Status (Cognition) oriented x 4  -SD       [facility] Name 07/25/24 1301          Safety Issues, Functional Mobility    Safety Issues Affecting Function (Mobility) safety precautions follow-through/compliance;safety precaution awareness  -SD     Impairments Affecting Function (Mobility) balance;endurance/activity tolerance;strength;shortness of breath;pain  -SD               User Key  (r) = Recorded By, (t) = Taken By, (c) = Cosigned By      Initials Name Provider Type    Aimee Bolanos OT Occupational Therapist                     Mobility/ADL's       [facility] 07/25/24 1303          Bed Mobility    Bed Mobility scooting/bridging;rolling left  -SD     Rolling Left [place] (Bed Mobility) standby assist  -SD     Scooting/Bridging [place] (Bed Mobility) dependent (less than 25% patient effort)  -SD     Bed Mobility, Safety Issues decreased use of arms for pushing/pulling;decreased use of legs for bridging/pushing;impaired trunk control for bed mobility  -SD     Assistive Device (Bed Mobility) bed rails;draw sheet;head of bed elevated  -SD       Row Name 07/25/24 1303          Sit-Stand Transfer    Sit-Stand [place] (Transfers) not tested  -SD       Row Name 07/25/24 1303          Functional Mobility    Functional Mobility- Ind. Level not tested  -SD       Row Name 07/25/24 1303          Activities of Daily Living    BADL Assessment/Intervention bathing;upper body dressing;lower body dressing;grooming;feeding;toileting  -SD       Row Name 07/25/24 1303          Bathing Assessment/Intervention    [place] Level (Bathing) moderate assist (50% patient effort);maximum assist (25% patient effort)  -SD       Row Name 07/25/24 1303          Upper Body Dressing Assessment/Training    [place] Level (Upper Body Dressing) minimum assist (75% patient effort)  -SD       Row Name 07/25/24 1303          Lower Body  Dressing Assessment/Training    [place] Level (Lower Body Dressing) maximum assist (25% patient effort)  -SD       Row Name 07/25/24 1303          Grooming Assessment/Training    [place] Level (Grooming) moderate assist (50% patient effort)  -SD       Row Name 07/25/24 1303          Self-Feeding Assessment/Training    [place] Level (Feeding) supervision  -SD       Row Name 07/25/24 1303          Toileting Assessment/Training    [place] Level (Toileting) perform perineal hygiene;maximum assist (25% patient effort)  -SD     Assistive Devices (Toileting) commode, bedside without drop arms  -SD               User Key  (r) = Recorded By, (t) = Taken By, (c) = Cosigned By      Initials Name Provider Type    SD Aimee Allan OT Occupational Therapist                   Obj/Interventions       Row Name 07/25/24 1304          Range of Motion Comprehensive    General Range of Motion bilateral upper extremity ROM WFL  -SD       Row Name 07/25/24 1304          Strength Comprehensive (MMT)    General Manual Muscle Testing (MMT) Assessment upper extremity strength deficits identified  -SD     Comment, General Manual Muscle Testing (MMT) Assessment declined MMT, grossly 3+/5  -SD               User Key  (r) = Recorded By, (t) = Taken By, (c) = Cosigned By      Initials Name Provider Type    Aimee Bolanos OT Occupational Therapist                   Goals/Plan       Row Name 07/25/24 1317          Transfer Goal 1 (OT)    Activity/Assistive Device (Transfer Goal 1, OT) sit-to-stand/stand-to-sit;walker, rolling  -SD     [place] Level/Cues Needed (Transfer Goal 1, OT) contact guard required  -SD     Time Frame (Transfer Goal 1, OT) long term goal (LTG)  -SD     Progress/Outcome (Transfer Goal 1, OT) goal ongoing  -SD       Row Name 07/25/24 1317          Dressing Goal 1 (OT)    Activity/Device (Dressing Goal 1, OT) lower body dressing  -SD     [place]/Cues Needed (Dressing Goal 1, OT) minimum  assist (75% or more patient effort)  -SD     Time Frame (Dressing Goal 1, OT) 2 weeks  -SD     Progress/Outcome (Dressing Goal 1, OT) goal ongoing  -SD       Row Name 07/25/24 1317          Toileting Goal 1 (OT)    Activity/Device (Toileting Goal 1, OT) toileting skills, all;commode, bedside without drop arms  -SD     [place] Level/Cues Needed (Toileting Goal 1, OT) minimum assist (75% or more patient effort)  -SD     Time Frame (Toileting Goal 1, OT) 2 weeks  -SD     Progress/Outcome (Toileting Goal 1, OT) goal ongoing  -SD       Row Name 07/25/24 1317          Strength Goal 1 (OT)    Strength Goal 1 (OT) Patient to perform UB ther ex as tolerated  -SD     Time Frame (Strength Goal 1, OT) long term goal (LTG)  -SD     Progress/Outcome (Strength Goal 1, OT) goal ongoing  -SD       Row Name 07/25/24 1317          Therapy Assessment/Plan (OT)    Planned Therapy Interventions (OT) activity tolerance training;adaptive equipment training;BADL retraining;patient/caregiver education/training;strengthening exercise  -SD               User Key  (r) = Recorded By, (t) = Taken By, (c) = Cosigned By      Initials Name Provider Type    SD Aimee Allan OT Occupational Therapist                   Clinical Impression       Row Name 07/25/24 1304          Pain Assessment    Pretreatment Pain Rating 10/10  -SD     Posttreatment Pain Rating 10/10  -SD     Pain Location generalized  -SD     Pain Intervention(s) Repositioned;Ambulation/increased activity  -SD       Row Name 07/25/24 1300          Plan of Care Review    Plan of Care Reviewed With patient;daughter  -SD     Progress no change  -SD     Outcome Evaluation OT eval completed. Patient is supine in bed, daughter is present at bedside. Patient is on 4L O2 satting 99%, Ox4, reports 10/10 generalized pain. Patient lives with her daughter in a split foyer home, but lives in den and can avoid all steps. Patient has been doing tf's only since mid June d/t L3 compression  "fracture. Patient has a WC, rollator, BSC. Sponge bathes with her daughter assisting her. Patient's daughter is interested in swing bed for STR. Patient required TD to scoot up in bed, rolled to left with SBA. Pillows placed for comfort and offloading. Patient declined further participation d/t pain and \"just wanting to rest\". Patient to continue with OT services to address generalized weakness and ADL decline. Patient would benefit from STR, with DC disposition being dependent on what patient will agree to.  -SD       Row Name 07/25/24 1304          Therapy Assessment/Plan (OT)    Patient/Family Therapy Goal Statement (OT) home  -SD     Rehab Potential (OT) fair, will monitor progress closely  -SD     Criteria for Skilled Therapeutic Interventions Met (OT) skilled treatment is necessary  -SD     Therapy Frequency (OT) 3 times/wk  5 times if indicated  -SD       Row Name 07/25/24 1304          Therapy Plan Review/Discharge Plan (OT)    Anticipated Discharge Disposition (OT) inpatient rehabilitation facility  -SD       Row Name 07/25/24 1304          Vital Signs    Pre SpO2 (%) 99  -SD     O2 Delivery Pre Treatment supplemental O2  -SD     O2 Delivery Intra Treatment supplemental O2  -SD     O2 Delivery Post Treatment supplemental O2  -SD       Row Name 07/25/24 1304          Positioning and Restraints    Pre-Treatment Position in bed  -SD     Post Treatment Position bed  -SD     In Bed side lying left;call light within reach;encouraged to call for assist;exit alarm on  -SD               User Key  (r) = Recorded By, (t) = Taken By, (c) = Cosigned By      Initials Name Provider Type    Aimee Bolanos OT Occupational Therapist                   Outcome Measures       Row Name 07/25/24 1320          How much help from another is currently needed...    Putting on and taking off regular lower body clothing? 2  -SD     Bathing (including washing, rinsing, and drying) 2  -SD     Toileting (which includes using toilet " bed pan or urinal) 2  -SD     Putting on and taking off regular upper body clothing 3  -SD     Taking care of personal grooming (such as brushing teeth) 2  -SD     Eating meals 3  -SD     AM-PAC 6 Clicks Score (OT) 14  -SD       Row Name 07/25/24 1257 07/25/24 0800       How much help from another person do you currently need...    Turning from your back to your side while in flat bed without using bedrails? 4  -MS 4  -ML    Moving from lying on back to sitting on the side of a flat bed without bedrails? 4  -MS 4  -ML    Moving to and from a bed to a chair (including a wheelchair)? 3  -MS 3  -ML    Standing up from a chair using your arms (e.g., wheelchair, bedside chair)? 3  -MS 3  -ML    Climbing 3-5 steps with a railing? 1  -MS 1  -ML    To walk in hospital room? 2  -MS 2  -ML    AM-PAC 6 Clicks Score (PT) 17  -MS 17  -ML    Highest Level of Mobility Goal 5 --> Static standing  -MS 5 --> Static standing  -ML      Row Name 07/25/24 1320          Functional Assessment    Outcome Measure Options AM-PAC 6 Clicks Daily Activity (OT)  -SD               User Key  (r) = Recorded By, (t) = Taken By, (c) = Cosigned By      Initials Name Provider Type    Aimee Bolanos, OT Occupational Therapist    Farzad Arndt, PT Physical Therapist    Socorro Odell, RN Registered Nurse                    Occupational Therapy Education       Title: PT OT SLP Therapies (In Progress)       Topic: Occupational Therapy (In Progress)       Point: ADL training (Done)       Description:   Instruct learner(s) on proper safety adaptation and remediation techniques during self care or transfers.   Instruct in proper use of assistive devices.                  Learning Progress Summary             Patient Acceptance, E,TB, VU by SD at 7/25/2024 1320    Comment: OT POC                         Point: Home exercise program (Not Started)       Description:   Instruct learner(s) on appropriate technique for monitoring, assisting and/or  "progressing therapeutic exercises/activities.                  Learner Progress:  Not documented in this visit.              Point: Precautions (Not Started)       Description:   Instruct learner(s) on prescribed precautions during self-care and functional transfers.                  Learner Progress:  Not documented in this visit.              Point: Body mechanics (Not Started)       Description:   Instruct learner(s) on proper positioning and spine alignment during self-care, functional mobility activities and/or exercises.                  Learner Progress:  Not documented in this visit.                              User Key       Initials Effective Dates Name Provider Type Discipline    SD 06/16/21 -  Aimee Allan OT Occupational Therapist OT                  OT Recommendation and Plan  Planned Therapy Interventions (OT): activity tolerance training, adaptive equipment training, BADL retraining, patient/caregiver education/training, strengthening exercise  Therapy Frequency (OT): 3 times/wk (5 times if indicated)  Plan of Care Review  Plan of Care Reviewed With: patient, daughter  Progress: no change  Outcome Evaluation: OT eval completed. Patient is supine in bed, daughter is present at bedside. Patient is on 4L O2 satting 99%, Ox4, reports 10/10 generalized pain. Patient lives with her daughter in a split foyer home, but lives in [facility] and can avoid all steps. Patient has been doing tf's only since mid June d/t L3 compression fracture. Patient has a WC, rollator, BSC. Sponge bathes with her daughter assisting her. Patient's daughter is interested in swing bed for STR. Patient required TD to scoot up in bed, rolled to left with SBA. Pillows placed for comfort and offloading. Patient declined further participation d/t pain and \"just wanting to rest\". Patient to continue with OT services to address generalized weakness and ADL decline. Patient would benefit from STR, with DC disposition being dependent on " what patient will agree to.     Time Calculation:   Evaluation Complexity (OT)  Review Occupational Profile/Medical/Therapy History Complexity: expanded/moderate complexity  Assessment, Occupational Performance/Identification of Deficit Complexity: 3-5 performance deficits  Clinical Decision Making Complexity (OT): detailed assessment/moderate complexity  Overall Complexity of Evaluation (OT): moderate complexity     Time Calculation- OT       Row Name 07/25/24 1321             Time Calculation- OT    OT Start Time 1011  -SD      OT Received On 07/25/24  -SD      OT Goal Re-Cert Due Date 08/06/24  -SD         Untimed Charges    OT Eval/Re-eval Minutes 60  -SD         Total Minutes    Untimed Charges Total Minutes 60  -SD       Total Minutes 60  -SD                User Key  (r) = Recorded By, (t) = Taken By, (c) = Cosigned By      Initials Name Provider Type    Aimee Bolanos OT Occupational Therapist                  Therapy Charges for Today       Code Description Service Date Service Provider Modifiers Qty    [id number] HC OT EVAL MOD COMPLEXITY 4 7/25/2024 Aimee Allan OT GO 1                 Aimee Allan OT  7/25/2024    Electronically signed by Aimee Allan OT at 07/25/24 1322

## 2024-07-25 NOTE — THERAPY EVALUATION
Patient Name: Gabi Dudley  : 1945    MRN: [id number]                              Today's Date: 2024       Admit Date: 2024    Visit Dx:     ICD-10-CM ICD-9-CM   1. NSTEMI (non-ST elevated myocardial infarction)  I21.4 410.70   2. Diverticulitis  K57.92 562.11   3. Diastasis of rectus abdominis  M62.08 728.84   4. Abdominal pain, unspecified abdominal location  R10.9 789.00   5. Nausea and vomiting, unspecified vomiting type  R11.2 787.01     Patient Active Problem List   Diagnosis    Adrenal adenoma    Anxiety    Chronic fatigue    Fibromyalgia    Hyperlipidemia    Essential hypertension    Insomnia    Osteoarthritis of knee    Osteoporosis    Pulmonary emphysema    Simple renal cyst    Tension headache    Vitamin D deficiency    Diverticulosis    Inversion of nipple    Paroxysmal atrial fibrillation    Coronary artery disease involving native coronary artery of native heart without angina pectoris    Autonomic dysfunction    Gastroesophageal reflux disease without esophagitis    Urge incontinence    Erythrasma    Compression fracture of T5 vertebra    Lung nodule    COPD (chronic obstructive pulmonary disease)    Overweight (BMI 25.0-29.9)    Acute on chronic respiratory failure with hypoxia    Acute on chronic respiratory failure with hypoxia and hypercapnia    Iron deficiency anemia    Impaired mobility and ADLs    Acute respiratory failure with hypoxia and hypercapnia    Aspiration pneumonia of right lower lobe    Partial small bowel obstruction    Moderate malnutrition    Bowel obstruction    Enteritis    Diverticulitis    Elevated troponin     Past Medical History:   Diagnosis Date    Adrenal adenoma     Anemia     Arrhythmia     Asthma     Atrial fibrillation     Back pain     Benign colonic polyp     Benign tumor of adrenal gland     Cataract     bilateral    Cholelithiasis     Chronic bronchitis     Chronic bronchitis with COPD (chronic obstructive pulmonary disease)     COPD (chronic  obstructive pulmonary disease) 2005    Coronary artery disease     Depression     Diverticulosis Years ago    Elevated cholesterol     Environmental and seasonal allergies     Fibromyalgia     Fibromyalgia, primary Sometime in the 90s    Gastritis     Generalized anxiety disorder     GERD (gastroesophageal reflux disease)     H/O mammogram     Headache Years ago    Hemorrhoids     History of blood transfusion 1985    History of blood transfusion 1985    History of echocardiogram     History of endometriosis     History of nuclear stress test     Hospitalization or health care facility admission within last 6 months     5 times between 11/2022-05/2023    Hypertension     IBS (irritable bowel syndrome)     Impaired functional mobility, balance, gait, and endurance     Impaired mobility     Inverted nipple     Kidney disease     Kidney stone     Liver cyst     Low back pain Years ago    Nodular radiologic density     Nodule of left lung     On home oxygen therapy     2 liters NC QHS    Osteoarthritis     Osteopenia Several years ago    Osteoporosis     PONV (postoperative nausea and vomiting)     Renal cyst     Sinus problem     2014    Sinusitis     Skin cancer     basal cell carcinoma    SOB (shortness of breath)     Tobacco use     Urinary frequency     Urinary tract infection Years ago    Frequent UTIs    Vitamin D deficiency     Wears glasses     Wears partial dentures     upper plate     Past Surgical History:   Procedure Laterality Date    APPENDECTOMY  1980s    CARDIAC CATHETERIZATION  2003    CATARACT EXTRACTION  2013    both eyes    CHOLECYSTECTOMY  2020    CHOLECYSTECTOMY WITH INTRAOPERATIVE CHOLANGIOGRAM N/A 10/30/2020    Procedure: CHOLECYSTECTOMY LAPAROSCOPIC INTRAOPERATIVE CHOLANGIOGRAPHY;  Surgeon: Sima Moses MD;  Location: [facility];  Service: General;  Laterality: N/A;    COLON SURGERY  2020    COLONOSCOPY  03/11/2013    COLONOSCOPY  06/21/2016    COLONOSCOPY N/A 07/24/2019    Procedure:  COLONOSCOPY W/ COLD FORCEP POLYPECTOMIES; HOT SNARE POLYPECTOMIES; COLD SNARE POLYPECTOMY;  Surgeon: Goyo Nunez MD;  Location: [facility] ENDOSCOPY;  Service: Gastroenterology    COLONOSCOPY W/ BIOPSIES AND POLYPECTOMY      EXPLORATORY LAPAROTOMY N/A 11/23/2020    Procedure: colectomy, right, closure of enterotomy x 2, reduction of internal volvulus;  Surgeon: Sima Moses MD;  Location: [facility] OR;  Service: General;  Laterality: N/A;    EXPLORATORY LAPAROTOMY N/A 8/9/2023    Procedure: LAPAROTOMY EXPLORATORY WITH LYIS OF ADHESIONS AND  CENTRAL LINE INSERTION;  Surgeon: Bianca Isaac DO;  Location: [facility] OR;  Service: General;  Laterality: N/A;    HYSTERECTOMY  1980s    partial    LYSIS OF ABDOMINAL ADHESIONS      TONSILLECTOMY  1987    UPPER GASTROINTESTINAL ENDOSCOPY  01/13/2015    VAGINAL DELIVERY      x2      General Information       Row Name 07/25/24 1301          OT Time and Intention    Document Type evaluation  -SD     Mode of Treatment occupational therapy  -SD       Row Name 07/25/24 1301          General Information    Patient Profile Reviewed yes  -SD     Prior Level of Function independent:;transfer;ADL's;min assist:;bathing  -SD     Existing Precautions/Restrictions fall;oxygen therapy device and L/min  -SD     Barriers to Rehab medically complex;previous functional deficit;ineffective coping  -SD       Row Name 07/25/24 1301          Living Environment    People in Home child(ashely), adult  -SD       Row Name 07/25/24 1301          Home Main Entrance    Number of Stairs, Main Entrance none  -SD       Row Name 07/25/24 1301          Stairs Within Home, Primary    Stairs, Within Home, Primary Split level home. Patient does not need to go up or down any steps. Den has been converted into a bedroom for patient  -SD     Number of Stairs, Within Home, Primary none  -SD       Row Name 07/25/24 1301          Cognition    Orientation Status (Cognition) oriented x 4  -SD       Row Name 07/25/24 1301           Safety Issues, Functional Mobility    Safety Issues Affecting Function (Mobility) safety precautions follow-through/compliance;safety precaution awareness  -SD     Impairments Affecting Function (Mobility) balance;endurance/activity tolerance;strength;shortness of breath;pain  -SD               User Key  (r) = Recorded By, (t) = Taken By, (c) = Cosigned By      Initials Name Provider Type    SD Aimee Allan OT Occupational Therapist                     Mobility/ADL's       [facility] 07/25/24 1303          Bed Mobility    Bed Mobility scooting/bridging;rolling left  -SD     Rolling Left [place] (Bed Mobility) standby assist  -SD     Scooting/Bridging [place] (Bed Mobility) dependent (less than 25% patient effort)  -SD     Bed Mobility, Safety Issues decreased use of arms for pushing/pulling;decreased use of legs for bridging/pushing;impaired trunk control for bed mobility  -SD     Assistive Device (Bed Mobility) bed rails;draw sheet;head of bed elevated  -SD       Row Name 07/25/24 1303          Sit-Stand Transfer    Sit-Stand [place] (Transfers) not tested  -SD       Row Name 07/25/24 1303          Functional Mobility    Functional Mobility- Ind. Level not tested  -SD       Row Name 07/25/24 1303          Activities of Daily Living    BADL Assessment/Intervention bathing;upper body dressing;lower body dressing;grooming;feeding;toileting  -SD       Row Name 07/25/24 1303          Bathing Assessment/Intervention    [place] Level (Bathing) moderate assist (50% patient effort);maximum assist (25% patient effort)  -SD       Row Name 07/25/24 1303          Upper Body Dressing Assessment/Training    [place] Level (Upper Body Dressing) minimum assist (75% patient effort)  -SD       Row Name 07/25/24 1303          Lower Body Dressing Assessment/Training    [place] Level (Lower Body Dressing) maximum assist (25% patient effort)  -SD       Row Name 07/25/24 1303          Grooming  Assessment/Training    [place] Level (Grooming) moderate assist (50% patient effort)  -SD       [facility] Name 07/25/24 1303          Self-Feeding Assessment/Training    [place] Level (Feeding) supervision  -SD       Row Name 07/25/24 1303          Toileting Assessment/Training    [place] Level (Toileting) perform perineal hygiene;maximum assist (25% patient effort)  -SD     Assistive Devices (Toileting) commode, bedside without drop arms  -SD               User Key  (r) = Recorded By, (t) = Taken By, (c) = Cosigned By      Initials Name Provider Type    Aimee Bolanos OT Occupational Therapist                   Obj/Interventions       [facility] Name 07/25/24 1304          Range of Motion Comprehensive    General Range of Motion bilateral upper extremity ROM WFL  -SD       Row Name 07/25/24 1304          Strength Comprehensive (MMT)    General Manual Muscle Testing (MMT) Assessment upper extremity strength deficits identified  -SD     Comment, General Manual Muscle Testing (MMT) Assessment declined MMT, grossly 3+/5  -SD               User Key  (r) = Recorded By, (t) = Taken By, (c) = Cosigned By      Initials Name Provider Type    Aimee Bolanos OT Occupational Therapist                   Goals/Plan       Row Name 07/25/24 1317          Transfer Goal 1 (OT)    Activity/Assistive Device (Transfer Goal 1, OT) sit-to-stand/stand-to-sit;walker, rolling  -SD     [place] Level/Cues Needed (Transfer Goal 1, OT) contact guard required  -SD     Time Frame (Transfer Goal 1, OT) long term goal (LTG)  -SD     Progress/Outcome (Transfer Goal 1, OT) goal ongoing  -SD       Row Name 07/25/24 1317          Dressing Goal 1 (OT)    Activity/Device (Dressing Goal 1, OT) lower body dressing  -SD     [place]/Cues Needed (Dressing Goal 1, OT) minimum assist (75% or more patient effort)  -SD     Time Frame (Dressing Goal 1, OT) 2 weeks  -SD     Progress/Outcome (Dressing Goal 1, OT) goal ongoing  -[facility]  Name 07/25/24 1317          Toileting Goal 1 (OT)    Activity/Device (Toileting Goal 1, OT) toileting skills, all;commode, bedside without drop arms  -SD     [place] Level/Cues Needed (Toileting Goal 1, OT) minimum assist (75% or more patient effort)  -SD     Time Frame (Toileting Goal 1, OT) 2 weeks  -SD     Progress/Outcome (Toileting Goal 1, OT) goal ongoing  -SD       Row Name 07/25/24 1317          Strength Goal 1 (OT)    Strength Goal 1 (OT) Patient to perform UB ther ex as tolerated  -SD     Time Frame (Strength Goal 1, OT) long term goal (LTG)  -SD     Progress/Outcome (Strength Goal 1, OT) goal ongoing  -SD       Row Name 07/25/24 1317          Therapy Assessment/Plan (OT)    Planned Therapy Interventions (OT) activity tolerance training;adaptive equipment training;BADL retraining;patient/caregiver education/training;strengthening exercise  -SD               User Key  (r) = Recorded By, (t) = Taken By, (c) = Cosigned By      Initials Name Provider Type    Aimee Bolanos, OT Occupational Therapist                   Clinical Impression       Row Name 07/25/24 1309          Pain Assessment    Pretreatment Pain Rating 10/10  -SD     Posttreatment Pain Rating 10/10  -SD     Pain Location generalized  -SD     Pain Intervention(s) Repositioned;Ambulation/increased activity  -SD       Row Name 07/25/24 130          Plan of Care Review    Plan of Care Reviewed With patient;daughter  -SD     Progress no change  -SD     Outcome Evaluation OT eval completed. Patient is supine in bed, daughter is present at bedside. Patient is on 4L O2 satting 99%, Ox4, reports 10/10 generalized pain. Patient lives with her daughter in a split foyer home, but lives in den and can avoid all steps. Patient has been doing tf's only since mid June d/t L3 compression fracture. Patient has a WC, rollator, BSC. Sponge bathes with her daughter assisting her. Patient's daughter is interested in swing bed for STR. Patient required TD  "to scoot up in bed, rolled to left with SBA. Pillows placed for comfort and offloading. Patient declined further participation d/t pain and \"just wanting to rest\". Patient to continue with OT services to address generalized weakness and ADL decline. Patient would benefit from STR, with DC disposition being dependent on what patient will agree to.  -SD       Row Name 07/25/24 1304          Therapy Assessment/Plan (OT)    Patient/Family Therapy Goal Statement (OT) home  -SD     Rehab Potential (OT) fair, will monitor progress closely  -SD     Criteria for Skilled Therapeutic Interventions Met (OT) skilled treatment is necessary  -SD     Therapy Frequency (OT) 3 times/wk  5 times if indicated  -SD       Row Name 07/25/24 1304          Therapy Plan Review/Discharge Plan (OT)    Anticipated Discharge Disposition (OT) inpatient rehabilitation facility  -SD       Row Name 07/25/24 1304          Vital Signs    Pre SpO2 (%) 99  -SD     O2 Delivery Pre Treatment supplemental O2  -SD     O2 Delivery Intra Treatment supplemental O2  -SD     O2 Delivery Post Treatment supplemental O2  -SD       Row Name 07/25/24 1304          Positioning and Restraints    Pre-Treatment Position in bed  -SD     Post Treatment Position bed  -SD     In Bed side lying left;call light within reach;encouraged to call for assist;exit alarm on  -SD               User Key  (r) = Recorded By, (t) = Taken By, (c) = Cosigned By      Initials Name Provider Type    Aimee Bolanos OT Occupational Therapist                   Outcome Measures       Row Name 07/25/24 1326          How much help from another is currently needed...    Putting on and taking off regular lower body clothing? 2  -SD     Bathing (including washing, rinsing, and drying) 2  -SD     Toileting (which includes using toilet bed pan or urinal) 2  -SD     Putting on and taking off regular upper body clothing 3  -SD     Taking care of personal grooming (such as brushing teeth) 2  -SD     " Eating meals 3  -SD     AM-PAC 6 Clicks Score (OT) 14  -SD       Row Name 07/25/24 1257 07/25/24 0800       How much help from another person do you currently need...    Turning from your back to your side while in flat bed without using bedrails? 4  -MS 4  -ML    Moving from lying on back to sitting on the side of a flat bed without bedrails? 4  -MS 4  -ML    Moving to and from a bed to a chair (including a wheelchair)? 3  -MS 3  -ML    Standing up from a chair using your arms (e.g., wheelchair, bedside chair)? 3  -MS 3  -ML    Climbing 3-5 steps with a railing? 1  -MS 1  -ML    To walk in hospital room? 2  -MS 2  -ML    AM-PAC 6 Clicks Score (PT) 17  -MS 17  -ML    Highest Level of Mobility Goal 5 --> Static standing  -MS 5 --> Static standing  -ML      Row Name 07/25/24 1320          Functional Assessment    Outcome Measure Options AM-PAC 6 Clicks Daily Activity (OT)  -SD               User Key  (r) = Recorded By, (t) = Taken By, (c) = Cosigned By      Initials Name Provider Type    Aimee Bolanos OT Occupational Therapist    Farzad Arndt, PT Physical Therapist    Socorro Odell, RN Registered Nurse                    Occupational Therapy Education       Title: PT OT SLP Therapies (In Progress)       Topic: Occupational Therapy (In Progress)       Point: ADL training (Done)       Description:   Instruct learner(s) on proper safety adaptation and remediation techniques during self care or transfers.   Instruct in proper use of assistive devices.                  Learning Progress Summary             Patient Acceptance, E,TB, VU by SD at 7/25/2024 1320    Comment: OT POC                         Point: Home exercise program (Not Started)       Description:   Instruct learner(s) on appropriate technique for monitoring, assisting and/or progressing therapeutic exercises/activities.                  Learner Progress:  Not documented in this visit.              Point: Precautions (Not Started)        "Description:   Instruct learner(s) on prescribed precautions during self-care and functional transfers.                  Learner Progress:  Not documented in this visit.              Point: Body mechanics (Not Started)       Description:   Instruct learner(s) on proper positioning and spine alignment during self-care, functional mobility activities and/or exercises.                  Learner Progress:  Not documented in this visit.                              User Key       Initials Effective Dates Name Provider Type Discipline    SD 06/16/21 -  Aimee Allan OT Occupational Therapist OT                  OT Recommendation and Plan  Planned Therapy Interventions (OT): activity tolerance training, adaptive equipment training, BADL retraining, patient/caregiver education/training, strengthening exercise  Therapy Frequency (OT): 3 times/wk (5 times if indicated)  Plan of Care Review  Plan of Care Reviewed With: patient, daughter  Progress: no change  Outcome Evaluation: OT eval completed. Patient is supine in bed, daughter is present at bedside. Patient is on 4L O2 satting 99%, Ox4, reports 10/10 generalized pain. Patient lives with her daughter in a split foyer home, but lives in den and can avoid all steps. Patient has been doing tf's only since mid June d/t L3 compression fracture. Patient has a WC, rollator, BSC. Sponge bathes with her daughter assisting her. Patient's daughter is interested in swing bed for STR. Patient required TD to scoot up in bed, rolled to left with SBA. Pillows placed for comfort and offloading. Patient declined further participation d/t pain and \"just wanting to rest\". Patient to continue with OT services to address generalized weakness and ADL decline. Patient would benefit from STR, with DC disposition being dependent on what patient will agree to.     Time Calculation:   Evaluation Complexity (OT)  Review Occupational Profile/Medical/Therapy History Complexity: expanded/moderate " complexity  Assessment, Occupational Performance/Identification of Deficit Complexity: 3-5 performance deficits  Clinical Decision Making Complexity (OT): detailed assessment/moderate complexity  Overall Complexity of Evaluation (OT): moderate complexity     Time Calculation- OT       Row Name 07/25/24 1321             Time Calculation- OT    OT Start Time 1011  -SD      OT Received On 07/25/24  -SD      OT Goal Re-Cert Due Date 08/06/24  -SD         Untimed Charges    OT Eval/Re-eval Minutes 60  -SD         Total Minutes    Untimed Charges Total Minutes 60  -SD       Total Minutes 60  -SD                User Key  (r) = Recorded By, (t) = Taken By, (c) = Cosigned By      Initials Name Provider Type    SD Aimee Allan, OT Occupational Therapist                  Therapy Charges for Today       Code Description Service Date Service Provider Modifiers Qty    [id number]  OT EVAL MOD COMPLEXITY 4 7/25/2024 Aimee Allan OT GO 1                 Aimee Allan OT  7/25/2024

## 2024-07-25 NOTE — PROGRESS NOTES
[facility] HOSPITALIST    PROGRESS NOTE    Name:  Gabi Dudley   Age:  79 y.o.  Sex:  female  :  1945  MRN:  [id number]   Visit Number:  [id number]  Admission Date:  2024  Date Of Service:  24  Primary Care Physician:  Krystina Saunders DO     LOS: 3 days :    Chief Complaint:      Abdominal pain    Subjective:    Patient seen and examined.  Patient sleeping.  Agreeable to go to short-term rehab.  States abdominal pain improved.  Denies chest pain.    Hospital Course:    Ms. Dudley is a pleasant 79-year-old female with pertinent past medical history of atrial fibrillation on Eliquis, anemia, depression, coronary artery disease, COPD on 4 L of oxygen at baseline, gastritis, GERD, prior bowel obstruction status post adhesiolysis with bowel resection and reduction of volvulus in  per Dr. Isaac.  Patient presented to emergency department due to abdominal pain, nausea, vomiting, and diarrhea.  Onset 2 weeks ago with worsening symptoms noted.  Patient currently lives with her daughter.  Had noticed prior bowel movement hard.  Denied fever.  No recent colonoscopy.    Upon ED presentation patient hemodynamically stable.  Pertinent labs and imaging included HS trop 289 and then 259, WBC 13.72, CT abdomen enteritis/diverticultis, mild obstruction, worsened L3 compression fracture.  General surgeon Dr. Isaac consulted.  Hospitalist consulted for further medical management.  Patient continued on Rocephin and Flagyl with severe pain noted in left lower quadrant likely diverticulitis.  Patient continued with supportive IV fluids/Zofran/pain control.  Troponin was noted to increase to 375 with a.m. labs.  Cardiology consulted.  Patient with intermittent chest pain noted over the past several weeks.  Cardiology suggested coronary angiogram with patient refusing at this time.  Cardiology recommended aspirin 81 mg daily, atorvastatin 40 mg at bedtime, transition diltiazem to low-dose  metoprolol with echo pending.  Unfortunately echo noted EF 36 to 40% with multiple regional wall motion abnormalities.  Patient noted to have worsening abdominal pain with repeat CT ordered that revealed mild worsening of diverticulitis.  Fortunately patient with improvement in symptoms and tolerating clear liquid diet.  Patient agreeable for short-term rehab.  PT/OT/case management following.    Review of Systems:     All systems were reviewed and negative except as mentioned in subjective, assessment and plan.    Vital Signs:    Temp:  [98 °F (36.7 °C)-99 °F (37.2 °C)] 98.8 °F (37.1 °C)  Heart Rate:  [80-85] 83  Resp:  [16-20] 18  BP: (104-119)/(57-69) 104/66    Intake and output:    I/O last 3 completed shifts:  In: 1920 [P.O.:720; I.V.:1200]  Out: 400 [Urine:400]  No intake/output data recorded.    Physical Examination:    General Appearance:  Alert and cooperative.  Chronically ill middle-aged female.  Agitated.   Head:  Atraumatic and normocephalic.   Eyes: Conjunctivae and sclerae normal, no icterus. No pallor.   Throat: No oral lesions, no thrush, oral mucosa moist.   Neck: Supple, trachea midline, no thyromegaly.   Lungs:   Breath sounds heard bilaterally equally.  No wheezing or crackles. No Pleural rub or bronchial breathing.  On 4 L nasal cannula unlabored.   Heart:  Normal S1 and S2, no murmur, no gallop, no rub. No JVD.   Abdomen:   Normal bowel sounds, no masses, no organomegaly. Soft, left lower quadrant tenderness with palpation noted, nondistended, no rebound tenderness.   Extremities: Supple, no edema, no cyanosis, no clubbing.   Skin: No bleeding or rash.  Scattered ecchymosis bilateral lower extremities.   Neurologic: Alert and oriented x 3. No facial asymmetry. Moves all four limbs. No tremors.  Generalized weakness.     Laboratory results:    Results from last 7 days   Lab Units 07/25/24  0657 07/24/24  2124 07/24/24  0839 07/23/24  0635 07/22/24  0929   SODIUM mmol/L 147*  --  141 145 142    POTASSIUM mmol/L 4.5 4.0 3.3* 3.5 3.5   CHLORIDE mmol/L 104  --  100 101 96*   CO2 mmol/L 34.1*  --  32.4* 31.3* 36.4*   BUN mg/dL 22  --  18 16 17   CREATININE mg/dL 0.51*  --  0.57 0.46* 0.65   CALCIUM mg/dL 8.7  --  8.9 8.8 8.7   BILIRUBIN mg/dL  --   --   --  0.2 0.2   ALK PHOS U/L  --   --   --  163* 192*   ALT (SGPT) U/L  --   --   --  20 18   AST (SGOT) U/L  --   --   --  56* 37*   GLUCOSE mg/dL 95  --  130* 105* 140*     Results from last 7 days   Lab Units 07/25/24  0657 07/24/24  0839 07/23/24  0635   WBC 10*3/mm3 9.65 12.85* 12.38*   HEMOGLOBIN g/dL 9.3* 10.2* 10.3*   HEMATOCRIT % 31.7* 34.2 34.6   PLATELETS 10*3/mm3 236 257 220         Results from last 7 days   Lab Units 07/23/24  0635 07/22/24  1124 07/22/24  0929   HSTROP T ng/L 375* 259* 289*     Results from last 7 days   Lab Units 07/22/24  1337   BLOODCX  No growth at 2 days  No growth at 2 days     Recent Labs     08/15/23  0715 08/16/23  0731 08/17/23  0958   PHART 7.364 7.358 7.452*   XDD1ZCI 55.3* 65.5* 55.6*   PO2ART 306.0* 70.7* 88.0   UMS0HMO 31.5* 36.8* 38.8*   BASEEXCESS 5.0* 9.6* 13.1*      I have reviewed the patient's laboratory results.    Radiology results:    CT Abdomen Pelvis Without Contrast    Result Date: 7/24/2024  PROCEDURE: CT ABDOMEN PELVIS WO CONTRAST-  HISTORY: ABD pain; I21.4-Non-ST elevation (NSTEMI) myocardial infarction; K57.92-Diverticulitis of intestine, part unspecified, without perforation or abscess without bleeding; M62.08-Separation of muscle (nontraumatic), other site; R10.9-Unspecified abdominal pain; R11.2-Nausea with vomiting, unspecified, diagnosed with sigmoid diverticulitis 2 days prior.  COMPARISON: 2 days prior.  PROCEDURE: Axial images were obtained from the lung bases to the pubic symphysis by computed tomography. This study was performed with techniques to keep radiation doses as low as reasonably achievable, (ALARA). Individualized dose reduction techniques using automated exposure control or  adjustment of mA and/or kV according to the patient size were employed.  FINDINGS:  ABDOMEN: The lung bases are clear. The heart is proper size. The limited non-contrast images of the liver are unremarkable. There are metallic surgical clips in the right upper quadrant consistent with previous cholecystectomy. The spleen is unremarkable. No adrenal masses are seen. The aorta is normal in caliber. There is no significant free fluid or adenopathy.  There is no nephrolithiasis. There is no hydronephrosis. Noted are bilateral, circumscribed, hypodense renal lesions consistent with cysts; rim calcification is noted and the largest cyst on the right; findings are stable. Vascular calcifications noted.  PELVIS: The GI tract again demonstrates moderate stool burden in the ascending and proximal half of the transverse colon extending up to the midline abdominal wall diastases; this is not significantly changed from the prior exam. There are small bowel loops in the left side of the abdomen which are upper limits of normal, increased from prior exam. Again noted is the infiltration of the fat surrounding the mid sigmoid colon at the location of multiple diverticula. This has extended proximally with new wall thickening of the proximal/mid sigmoid colon. No definite free fluid is seen. No free air is identified. No definite extraluminal air identified. No rim-enhancing fluid collection seen. The appendix is not identified. The urinary bladder is partially filled and appears normal. There is no fluid or adenopathy. Severe L3 compression fracture again noted.      Impression: Mildly worsened sigmoid diverticulitis compared to prior exam with no abscess identified at this time.     CTDI: 5.85 mGy DLP:265.43 mGy.cm  This report was signed and finalized on 7/24/2024 2:13 PM by Angeles Collins MD.      Adult Transthoracic Echo Limited W/ Cont if Necessary Per Protocol    Result Date: 7/23/2024    Left ventricular systolic function is  mildly to moderately decreased. Left ventricular ejection fraction appears to be 36 - 40%.   Regional wall motion abnormalities as below.   No left ventricular thrombus identified by contrasted study.     Adult Transthoracic Echo Complete W/ Cont if Necessary Per Protocol    Result Date: 7/23/2024    Left ventricular systolic function is mildly decreased. Calculated left ventricular EF = 41.1% Left ventricular ejection fraction appears to be 41 - 45%.  Grade I diastolic dysfunction present.   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  No obvious large mass or thrombus was noted but small thrombus cannot be definitively excluded.  Recommend repeat limited echo with contrast for further evaluation.   Right ventricle of normal size with borderline systolic function.   Mild mitral valve regurgitation is present with an anteriorly-directed jet noted.   A prominent Chiari network may be present in the right atrium.    I have reviewed the patient's radiology reports.    Medication Review:     I have reviewed the patient's active and prn medications.     Problem List:      Bowel obstruction    Coronary artery disease involving native coronary artery of native heart without angina pectoris    COPD (chronic obstructive pulmonary disease)    Impaired mobility and ADLs    Enteritis    Diverticulitis    Elevated troponin      Assessment:    Left lower quadrant pain, acute diverticulitis, POA  Concern for possible partial bowel obstruction, POA  Elevated troponin, POA  Cardiomyopathy  Coronary artery disease  COPD on 4 L  Compression fracture L3  Atrial fibrillation on Eliquis    Plan:    Left lower quadrant pain/diverticulitis/questionable partial bowel obstruction  -Continue Rocephin and Flagyl for left lower quadrant pain likely acute diverticulitis  -General surgeon Dr. Isaac following.  -Diet advanced to clears.    -Passing flatus with bowel movement noted.  -Repeat CT did note mild worsening of diverticulitis.   Appears to have improved symptoms this morning with reassuring labs and vitals.  -Continue antibiotic therapy and clear liquid diet for now.  -Urinalysis appears infected.  Continue Rocephin with urine culture pending.  -Trend hypernatremia.    Elevated troponin/cardiomyopathy  -Cardiology following.  No current chest pain.  -Diltiazem discontinued and metoprolol added  -Atorvastatin increased.  -Will add aspirin upon discharge.  -Cardiomyopathy noted with a EF 36 to 40% with echo with multiple regional wall motion abnormalities.  Could be stress-induced.    -Add ACE/ARB once blood pressure able to tolerate.    -Continue home medications as appropriate.  -Patient does have follow-up with Dr. Kapadia on Thursday.  Updated patient currently admitted and will need to reschedule.  -Discussed with daughter Farida who thinks that patient would be agreeable to swing bed.  She had a good experience at [facility] in the past.  -PT/OT evaluations pending.    I have reviewed the copied text and it is accurate as of 7/25/2024     DVT Prophylaxis: Eliquis  Code Status: Full code  Diet: Clears  Discharge Plan: Short-term rehab pending.    Britt Nascimento, APRN  07/25/24  09:47 EDT    Dictated utilizing Dragon dictation.

## 2024-07-25 NOTE — PLAN OF CARE
Problem: Adjustment to Illness (Sepsis/Septic Shock)  Goal: Optimal Coping  Outcome: Ongoing, Progressing     Problem: Bleeding (Sepsis/Septic Shock)  Goal: Absence of Bleeding  Outcome: Ongoing, Progressing     Problem: Glycemic Control Impaired (Sepsis/Septic Shock)  Goal: Blood Glucose Level Within Desired Range  Outcome: Ongoing, Progressing     Problem: Infection Progression (Sepsis/Septic Shock)  Goal: Absence of Infection Signs and Symptoms  Outcome: Ongoing, Progressing  Intervention: Initiate Sepsis Management  Recent Flowsheet Documentation  Taken 7/25/2024 0000 by Ashleigh Pedro RN  Infection Prevention:   environmental surveillance performed   rest/sleep promoted  Taken 7/24/2024 2200 by Ashleigh Pedro RN  Infection Prevention:   environmental surveillance performed   rest/sleep promoted  Taken 7/24/2024 2045 by Ashleigh Pedro RN  Infection Prevention:   environmental surveillance performed   rest/sleep promoted  Intervention: Promote Recovery  Recent Flowsheet Documentation  Taken 7/25/2024 0000 by Ashleigh Pedro RN  Activity Management: activity minimized  Taken 7/24/2024 2200 by Ashleigh Pedro RN  Activity Management: activity minimized  Taken 7/24/2024 2045 by Ashleigh Pedro RN  Activity Management: activity minimized     Problem: Nutrition Impaired (Sepsis/Septic Shock)  Goal: Optimal Nutrition Intake  Outcome: Ongoing, Progressing     Problem: Adult Inpatient Plan of Care  Goal: Plan of Care Review  Outcome: Ongoing, Progressing  Goal: Patient-Specific Goal (Individualized)  Outcome: Ongoing, Progressing  Goal: Absence of Hospital-Acquired Illness or Injury  Outcome: Ongoing, Progressing  Intervention: Identify and Manage Fall Risk  Recent Flowsheet Documentation  Taken 7/25/2024 0000 by Ashleigh Pedro RN  Safety Promotion/Fall Prevention:   activity supervised   assistive device/personal items within reach   clutter free environment maintained   safety round/check  completed  Taken 7/24/2024 2200 by Ashleigh Pedro RN  Safety Promotion/Fall Prevention:   activity supervised   assistive device/personal items within reach   clutter free environment maintained   safety round/check completed  Taken 7/24/2024 2045 by Ashleigh Pedro RN  Safety Promotion/Fall Prevention:   activity supervised   assistive device/personal items within reach   clutter free environment maintained   safety round/check completed  Intervention: Prevent Skin Injury  Recent Flowsheet Documentation  Taken 7/25/2024 0000 by Ashleigh Pedro RN  Body Position:   position changed independently   supine, legs elevated  Taken 7/24/2024 2200 by Ashleigh Pedro RN  Body Position:   position changed independently   tilted   right  Taken 7/24/2024 2045 by Ashleigh Pedro RN  Body Position: position changed independently  Intervention: Prevent and Manage VTE (Venous Thromboembolism) Risk  Recent Flowsheet Documentation  Taken 7/25/2024 0000 by Ashleigh Pedro RN  Activity Management: activity minimized  Taken 7/24/2024 2200 by Ashleigh Pedro RN  Activity Management: activity minimized  Taken 7/24/2024 2045 by Ashleigh Pedro RN  Activity Management: activity minimized  Intervention: Prevent Infection  Recent Flowsheet Documentation  Taken 7/25/2024 0000 by Ashleigh Pedro RN  Infection Prevention:   environmental surveillance performed   rest/sleep promoted  Taken 7/24/2024 2200 by Ashleigh Pedro RN  Infection Prevention:   environmental surveillance performed   rest/sleep promoted  Taken 7/24/2024 2045 by Ashleigh Pedro RN  Infection Prevention:   environmental surveillance performed   rest/sleep promoted  Goal: Optimal Comfort and Wellbeing  Outcome: Ongoing, Progressing  Goal: Readiness for Transition of Care  Outcome: Ongoing, Progressing     Problem: Skin Injury Risk Increased  Goal: Skin Health and Integrity  Outcome: Ongoing, Progressing  Intervention: Optimize Skin  Protection  Recent Flowsheet Documentation  Taken 7/25/2024 0000 by Ashleigh Pedro RN  Head of Bed (HOB) Positioning: HOB elevated  Taken 7/24/2024 2200 by Ashleigh Pedro RN  Head of Bed (HOB) Positioning: HOB elevated  Taken 7/24/2024 2045 by Ashleigh Pedro RN  Head of Bed (HOB) Positioning: HOB elevated     Problem: Fall Injury Risk  Goal: Absence of Fall and Fall-Related Injury  Outcome: Ongoing, Progressing  Intervention: Identify and Manage Contributors  Recent Flowsheet Documentation  Taken 7/24/2024 2045 by Ashleigh Pedro RN  Medication Review/Management: medications reviewed  Intervention: Promote Injury-Free Environment  Recent Flowsheet Documentation  Taken 7/25/2024 0000 by Ashleigh Pedro RN  Safety Promotion/Fall Prevention:   activity supervised   assistive device/personal items within reach   clutter free environment maintained   safety round/check completed  Taken 7/24/2024 2200 by Ashleigh Pedro RN  Safety Promotion/Fall Prevention:   activity supervised   assistive device/personal items within reach   clutter free environment maintained   safety round/check completed  Taken 7/24/2024 2045 by Ashleigh Pedro RN  Safety Promotion/Fall Prevention:   activity supervised   assistive device/personal items within reach   clutter free environment maintained   safety round/check completed   Goal Outcome Evaluation:            Plan of care reviewed with patient and family at bedside.

## 2024-07-25 NOTE — PROGRESS NOTES
Dietitian Assessment    Patient Name: Gabi Dudley  YOB: 1945  MRN: [id number]  Admission date: 7/22/2024    Comment:    Pt NPO/clear liquid diet x 3 days. Will order Boost Breeze BID to help promote PO intake.    Clinical Nutrition Assessment      Reason for Assessment NPO/clear liquid x 3 days   H&P  Past Medical History:   Diagnosis Date    Adrenal adenoma     Anemia     Arrhythmia     Asthma     Atrial fibrillation     Back pain     Benign colonic polyp     Benign tumor of adrenal gland     Cataract     bilateral    Cholelithiasis     Chronic bronchitis     Chronic bronchitis with COPD (chronic obstructive pulmonary disease)     COPD (chronic obstructive pulmonary disease) 2005    Coronary artery disease     Depression     Diverticulosis Years ago    Elevated cholesterol     Environmental and seasonal allergies     Fibromyalgia     Fibromyalgia, primary Sometime in the 90s    Gastritis     Generalized anxiety disorder     GERD (gastroesophageal reflux disease)     H/O mammogram     Headache Years ago    Hemorrhoids     History of blood transfusion 1985    History of blood transfusion 1985    History of echocardiogram     History of endometriosis     History of nuclear stress test     Hospitalization or health care facility admission within last 6 months     5 times between 11/2022-05/2023    Hypertension     IBS (irritable bowel syndrome)     Impaired functional mobility, balance, gait, and endurance     Impaired mobility     Inverted nipple     Kidney disease     Kidney stone     Liver cyst     Low back pain Years ago    Nodular radiologic density     Nodule of left lung     On home oxygen therapy     2 liters NC QHS    Osteoarthritis     Osteopenia Several years ago    Osteoporosis     PONV (postoperative nausea and vomiting)     Renal cyst     Sinus problem     2014    Sinusitis     Skin cancer     basal cell carcinoma    SOB (shortness of breath)     Tobacco use     Urinary frequency      "Urinary tract infection Years ago    Frequent UTIs    Vitamin D deficiency     Wears glasses     Wears partial dentures     upper plate       Past Surgical History:   Procedure Laterality Date    APPENDECTOMY  1980s    CARDIAC CATHETERIZATION  2003    CATARACT EXTRACTION  2013    both eyes    CHOLECYSTECTOMY  2020    CHOLECYSTECTOMY WITH INTRAOPERATIVE CHOLANGIOGRAM N/A 10/30/2020    Procedure: CHOLECYSTECTOMY LAPAROSCOPIC INTRAOPERATIVE CHOLANGIOGRAPHY;  Surgeon: Sima Moses MD;  Location: [facility] OR;  Service: General;  Laterality: N/A;    COLON SURGERY  2020    COLONOSCOPY  03/11/2013    COLONOSCOPY  06/21/2016    COLONOSCOPY N/A 07/24/2019    Procedure: COLONOSCOPY W/ COLD FORCEP POLYPECTOMIES; HOT SNARE POLYPECTOMIES; COLD SNARE POLYPECTOMY;  Surgeon: Goyo Nunez MD;  Location: [facility] ENDOSCOPY;  Service: Gastroenterology    COLONOSCOPY W/ BIOPSIES AND POLYPECTOMY      EXPLORATORY LAPAROTOMY N/A 11/23/2020    Procedure: colectomy, right, closure of enterotomy x 2, reduction of internal volvulus;  Surgeon: Sima Moses MD;  Location: [facility] OR;  Service: General;  Laterality: N/A;    EXPLORATORY LAPAROTOMY N/A 8/9/2023    Procedure: LAPAROTOMY EXPLORATORY WITH LYIS OF ADHESIONS AND  CENTRAL LINE INSERTION;  Surgeon: Bianca Isaac DO;  Location: [facility] OR;  Service: General;  Laterality: N/A;    HYSTERECTOMY  1980s    partial    LYSIS OF ABDOMINAL ADHESIONS      TONSILLECTOMY  1987    UPPER GASTROINTESTINAL ENDOSCOPY  01/13/2015    VAGINAL DELIVERY      x2            Current Problems        Encounter Information        Trending Narrative     7/25: Pt NPO/clear liquid x 3 days d/t diverticulitis and concern for bowel obstruction. Average PO intake 37%. Will add boost breeze BID to promote PO intake.      Anthropometrics        Current Height, Weight Height: 165.1 cm (65\")  Weight: 68 kg (149 lb 14.6 oz) (07/23/24 1445)   Trending Weight Hx     This admission:              PTA:     Wt Readings from " Last 30 Encounters:   07/23/24 1445 68 kg (149 lb 14.6 oz)   07/23/24 1229 68 kg (149 lb 14.6 oz)   07/22/24 0941 68 kg (149 lb 14.6 oz)   07/10/24 1712 68 kg (150 lb)   06/27/24 2334 68 kg (150 lb)   06/13/24 1735 68 kg (150 lb)   06/05/24 1653 68 kg (150 lb)   01/25/24 1435 64.9 kg (143 lb)   01/09/24 1626 64.9 kg (143 lb)   12/11/23 1641 65.8 kg (145 lb)   10/31/23 1610 65.8 kg (145 lb)   09/26/23 1538 66.2 kg (146 lb)   09/19/23 1547 66.7 kg (147 lb 1.9 oz)   09/18/23 1305 67.6 kg (149 lb)   09/01/23 1057 67.6 kg (149 lb)   08/18/23 0400 75.3 kg (166 lb 0.1 oz)   08/17/23 0300 74.5 kg (164 lb 3.9 oz)   08/16/23 0300 76.7 kg (169 lb 1.5 oz)   08/14/23 0400 76.1 kg (167 lb 12.3 oz)   08/13/23 0300 77.2 kg (170 lb 3.1 oz)   08/12/23 0300 75.7 kg (166 lb 14.2 oz)   08/11/23 0300 76.4 kg (168 lb 6.9 oz)   08/10/23 0336 74 kg (163 lb 2.3 oz)   08/09/23 0444 75.1 kg (165 lb 9.1 oz)   08/08/23 0500 72.7 kg (160 lb 4.4 oz)   08/07/23 2001 72.7 kg (160 lb 4.4 oz)   08/07/23 1514 72.6 kg (160 lb)   07/30/23 1900 72.6 kg (160 lb)   07/20/23 1721 72.8 kg (160 lb 6.4 oz)   07/20/23 1524 72.6 kg (160 lb)   06/27/23 1519 73.5 kg (162 lb)   06/26/23 1332 73.5 kg (162 lb)   06/02/23 1525 73.5 kg (162 lb)   05/30/23 1339 73.2 kg (161 lb 6.4 oz)   05/26/23 2216 72.6 kg (160 lb)   05/25/23 0425 75.3 kg (166 lb 0.1 oz)   05/24/23 0333 75.1 kg (165 lb 9.1 oz)   05/23/23 0328 77.6 kg (171 lb 1.2 oz)   05/22/23 1805 75.6 kg (166 lb 10.7 oz)   05/11/23 0500 75.6 kg (166 lb 10.7 oz)   05/10/23 0411 75.7 kg (166 lb 14.2 oz)   05/09/23 0500 74.1 kg (163 lb 5.8 oz)   05/08/23 0305 74.1 kg (163 lb 5.8 oz)   05/06/23 0447 75.4 kg (166 lb 3.6 oz)   05/05/23 0538 73.2 kg (161 lb 6 oz)   05/05/23 0057 72.6 kg (160 lb)   05/01/23 1940 72.6 kg (160 lb)   03/22/23 1902 72.6 kg (160 lb)   03/16/23 1955 72.6 kg (160 lb)   02/13/23 1152 73 kg (161 lb)   02/09/23 1343 72.1 kg (159 lb)   02/07/23 1457 72.6 kg (160 lb)      BMI kg/m2 Body mass index is  24.95 kg/m².     Labs        Pertinent Labs     Results from last 7 days   Lab Units 07/24/24 2124 07/24/24  0839 07/23/24  0635 07/22/24  0929   SODIUM mmol/L  --  141 145 142   POTASSIUM mmol/L 4.0 3.3* 3.5 3.5   CHLORIDE mmol/L  --  100 101 96*   CO2 mmol/L  --  32.4* 31.3* 36.4*   BUN mg/dL  --  18 16 17   CREATININE mg/dL  --  0.57 0.46* 0.65   CALCIUM mg/dL  --  8.9 8.8 8.7   BILIRUBIN mg/dL  --   --  0.2 0.2   ALK PHOS U/L  --   --  163* 192*   ALT (SGPT) U/L  --   --  20 18   AST (SGOT) U/L  --   --  56* 37*   GLUCOSE mg/dL  --  130* 105* 140*       Results from last 7 days   Lab Units 07/24/24  0839 07/23/24  0635   MAGNESIUM mg/dL  --  1.8   PHOSPHORUS mg/dL  --  3.3   HEMOGLOBIN g/dL 10.2* 10.3*   HEMATOCRIT % 34.2 34.6       Lab Results   Component Value Date    HGBA1C 6.20 (H) 01/09/2017            Medications       Scheduled Medications amiodarone, 200 mg, Oral, Q24H  apixaban, 5 mg, Oral, BID  atorvastatin, 40 mg, Oral, Daily  budesonide, 0.5 mg, Nebulization, BID - RT  cefTRIAXone, 2,000 mg, Intravenous, Q24H  cetirizine, 10 mg, Oral, Daily  clotrimazole-betamethasone, 1 application , Topical, BID  DULoxetine, 60 mg, Oral, BID  ferrous sulfate, 324 mg, Oral, Daily With Breakfast  fluticasone, 2 spray, Nasal, Daily  ipratropium-albuterol, 3 mL, Nebulization, Q6H While Awake - RT  melatonin, 5 mg, Oral, Nightly  metoprolol succinate XL, 25 mg, Oral, Q24H  metroNIDAZOLE, 500 mg, Intravenous, Q8H  pantoprazole, 40 mg, Oral, Daily  QUEtiapine, 12.5 mg, Oral, Nightly  senna-docusate sodium, 2 tablet, Oral, BID  sertraline, 150 mg, Oral, Daily  sodium chloride, 10 mL, Intravenous, Q12H  vitamin D3, 5,000 Units, Oral, Daily        Infusions sodium chloride, 75 mL/hr, Last Rate: 75 mL/hr (07/24/24 1835)         PRN Medications   acetaminophen    albuterol    senna-docusate sodium **AND** polyethylene glycol **AND** bisacodyl **AND** bisacodyl    calcium carbonate    clonazePAM    cyclobenzaprine     Diclofenac Sodium    nitroglycerin    ondansetron ODT **OR** ondansetron    ondansetron ODT    oxyCODONE-acetaminophen    Potassium Replacement - Follow Nurse / BPA Driven Protocol    sodium chloride    sodium chloride    sodium chloride    sodium chloride    traZODone     Physical Findings        Trending Physical   Appearance, NFPE    --  Edema  None noted     Bowel Function LBM: 7/25     Tubes Peripheral IV     Chewing/Swallowing WNL     Skin WNL       Estimated/Assessed Needs       Energy Requirements    EST Needs, Method, Wt used 5276-9843 kcal (25-30 kcal/kg CBW)       Protein Requirements    EST Needs, Method, Wt used 54-68 g pro (.8-1 g pro/kg CBW)       Fluid Requirements     Estimated Needs (mL/day) 7295-1020 mL       Current Nutrition Orders & Evaluation of Intake       Oral Nutrition     Food Allergies NKFA   Current PO Diet Diet: Liquid; Clear Liquid; Fluid Consistency: Thin (IDDSI 0)   Supplement    PO Evaluation     Trending % PO Intake 37% x 2 meals       Nutrition Diagnosis         Nutrition Dx Problem 1 Altered GI function r/t diverticulitis AEB need for clear liquid diet.      Nutrition Dx Problem 2        Intervention Goal         Intervention Goal(s) Maintain CBW  PO intake meet >50% of estimated needs  Diet advancement per MD  Adhere to ONS     Nutrition Intervention        RD Action Will order Boost Breeze BID     Nutrition Prescription          Diet Prescription Clear liquid   Supplement Prescription Boost Breeze BID     Enteral Prescription        TPN Prescription      Monitor/Evaluation        Monitor Per protocol, I&O, PO intake, Supplement intake, Pertinent labs, Weight, Skin status, GI status, Symptoms, POC/GOC, Swallow function, Hemodynamic stability     RD to f/up    Electronically signed by:  Zandra Flores RD  07/25/24 07:56 EDT

## 2024-07-25 NOTE — CASE MANAGEMENT/SOCIAL WORK
Case Management/Social Work    Patient Name:  Gabi Dudley  YOB: 1945  MRN: [id number]  Admit Date:  7/22/2024        CM states pt/family wanting STR, referrals to any local facilities except to [facility]. Pt has not worked with therapy yet. SW will need therapy evaluations before able to send referrals. SW updated CM. SW following.     14:52 EDT SW sent referral to local facilities. [facility] following.       Electronically signed by:  SAGAR Willard  07/25/24 09:51 EDT

## 2024-07-25 NOTE — CASE MANAGEMENT/SOCIAL WORK
Case Management/Social Work    Patient Name:  Gabi Dudley  YOB: 1945  MRN: [id number]  Admit Date:  7/22/2024        09:34 EDT  Met with patient at bedside. Patient asked about STR. We discussed options and she requested referrals to [facility] except for [facility]. Informed patient that she would have to work with PT/OT prior to sending referrals. She stated she was agreeable. CM will continue to follow.      Electronically signed by:  Jesus Manuel Mathis RN  07/25/24 09:34 EDT

## 2024-07-25 NOTE — PLAN OF CARE
"Goal Outcome Evaluation:  Plan of Care Reviewed With: patient, daughter        Progress: no change  Outcome Evaluation: OT eval completed. Patient is supine in bed, daughter is present at bedside. Patient is on 4L O2 satting 99%, Ox4, reports 10/10 generalized pain. Patient lives with her daughter in a split foyer home, but lives in den and can avoid all steps. Patient has been doing tf's only since mid June d/t L3 compression fracture. Patient has a WC, rollator, BSC. Sponge bathes with her daughter assisting her. Patient's daughter is interested in swing bed for STR. Patient required TD to scoot up in bed, rolled to left with SBA. Pillows placed for comfort and offloading. Patient declined further participation d/t pain and \"just wanting to rest\". Patient to continue with OT services to address generalized weakness and ADL decline. Patient would benefit from STR, with DC disposition being dependent on what patient will agree to.      Anticipated Discharge Disposition (OT): inpatient rehabilitation facility                        "

## 2024-07-25 NOTE — PLAN OF CARE
Goal Outcome Evaluation:  Plan of Care Reviewed With: patient, daughter        Progress: no change  Outcome Evaluation: Pt participated in PT eval this date. Pt was independent with gait with rollator prior to June, in June she had a lumbar compression fx, and since then her daughter states she has mainly just been transferring to a w/c.  Pt states that she is in alot of pain all over and is unable to do much. States she is uncomfortable and would like to be on her side to see if she can relieve her pain. Pt was SBA to roll to the L. Declined further OOB mobility due to pain. Pt states she would like to go home, but did not decline swing bed option when her daughter discussed it. Pt is expected to benefit from skilled PT tx during this inpatient stay to address deficits in strength, transfers and mobility.      Anticipated Discharge Disposition (PT): skilled nursing facility, inpatient rehabilitation facility, home with assist, home with home health

## 2024-07-26 VITALS
DIASTOLIC BLOOD PRESSURE: 65 MMHG | TEMPERATURE: 98.5 F | OXYGEN SATURATION: 96 % | HEIGHT: 65 IN | BODY MASS INDEX: 24.98 KG/M2 | RESPIRATION RATE: 22 BRPM | SYSTOLIC BLOOD PRESSURE: 127 MMHG | HEART RATE: 78 BPM | WEIGHT: 149.91 LBS

## 2024-07-26 LAB
ANION GAP SERPL CALCULATED.3IONS-SCNC: 4.2 MMOL/L (ref 5–15)
BACTERIA SPEC AEROBE CULT: NO GROWTH
BASOPHILS # BLD AUTO: 0.02 10*3/MM3 (ref 0–0.2)
BASOPHILS NFR BLD AUTO: 0.3 % (ref 0–1.5)
BUN SERPL-MCNC: 19 MG/DL (ref 8–23)
BUN/CREAT SERPL: 37.3 (ref 7–25)
CALCIUM SPEC-SCNC: 8.6 MG/DL (ref 8.6–10.5)
CHLORIDE SERPL-SCNC: 102 MMOL/L (ref 98–107)
CO2 SERPL-SCNC: 34.8 MMOL/L (ref 22–29)
CREAT SERPL-MCNC: 0.51 MG/DL (ref 0.57–1)
DEPRECATED RDW RBC AUTO: 50.6 FL (ref 37–54)
EGFRCR SERPLBLD CKD-EPI 2021: 96.3 ML/MIN/1.73
EOSINOPHIL # BLD AUTO: 0.08 10*3/MM3 (ref 0–0.4)
EOSINOPHIL NFR BLD AUTO: 1.1 % (ref 0.3–6.2)
ERYTHROCYTE [DISTWIDTH] IN BLOOD BY AUTOMATED COUNT: 15.6 % (ref 12.3–15.4)
GLUCOSE SERPL-MCNC: 99 MG/DL (ref 65–99)
HCT VFR BLD AUTO: 31 % (ref 34–46.6)
HGB BLD-MCNC: 9.1 G/DL (ref 12–15.9)
HYPOCHROMIA BLD QL: NORMAL
IMM GRANULOCYTES # BLD AUTO: 0.04 10*3/MM3 (ref 0–0.05)
IMM GRANULOCYTES NFR BLD AUTO: 0.5 % (ref 0–0.5)
LYMPHOCYTES # BLD AUTO: 0.95 10*3/MM3 (ref 0.7–3.1)
LYMPHOCYTES NFR BLD AUTO: 12.6 % (ref 19.6–45.3)
MCH RBC QN AUTO: 26.4 PG (ref 26.6–33)
MCHC RBC AUTO-ENTMCNC: 29.4 G/DL (ref 31.5–35.7)
MCV RBC AUTO: 89.9 FL (ref 79–97)
MONOCYTES # BLD AUTO: 0.54 10*3/MM3 (ref 0.1–0.9)
MONOCYTES NFR BLD AUTO: 7.2 % (ref 5–12)
NEUTROPHILS NFR BLD AUTO: 5.92 10*3/MM3 (ref 1.7–7)
NEUTROPHILS NFR BLD AUTO: 78.3 % (ref 42.7–76)
NRBC BLD AUTO-RTO: 0 /100 WBC (ref 0–0.2)
OVALOCYTES BLD QL SMEAR: NORMAL
PLATELET # BLD AUTO: 217 10*3/MM3 (ref 140–450)
PMV BLD AUTO: 11.3 FL (ref 6–12)
POTASSIUM SERPL-SCNC: 3.5 MMOL/L (ref 3.5–5.2)
RBC # BLD AUTO: 3.45 10*6/MM3 (ref 3.77–5.28)
SMALL PLATELETS BLD QL SMEAR: ADEQUATE
SODIUM SERPL-SCNC: 141 MMOL/L (ref 136–145)
STOMATOCYTES BLD QL SMEAR: NORMAL
WBC MORPH BLD: NORMAL
WBC NRBC COR # BLD AUTO: 7.55 10*3/MM3 (ref 3.4–10.8)

## 2024-07-26 PROCEDURE — 25010000002 METRONIDAZOLE 500 MG/100ML SOLUTION: Performed by: INTERNAL MEDICINE

## 2024-07-26 PROCEDURE — 99239 HOSP IP/OBS DSCHRG MGMT >30: CPT | Performed by: INTERNAL MEDICINE

## 2024-07-26 PROCEDURE — 97530 THERAPEUTIC ACTIVITIES: CPT

## 2024-07-26 PROCEDURE — 94664 DEMO&/EVAL PT USE INHALER: CPT

## 2024-07-26 PROCEDURE — 85007 BL SMEAR W/DIFF WBC COUNT: CPT | Performed by: NURSE PRACTITIONER

## 2024-07-26 PROCEDURE — 25010000002 ONDANSETRON PER 1 MG: Performed by: INTERNAL MEDICINE

## 2024-07-26 PROCEDURE — 94761 N-INVAS EAR/PLS OXIMETRY MLT: CPT

## 2024-07-26 PROCEDURE — 80048 BASIC METABOLIC PNL TOTAL CA: CPT | Performed by: NURSE PRACTITIONER

## 2024-07-26 PROCEDURE — 94799 UNLISTED PULMONARY SVC/PX: CPT

## 2024-07-26 PROCEDURE — 99232 SBSQ HOSP IP/OBS MODERATE 35: CPT | Performed by: STUDENT IN AN ORGANIZED HEALTH CARE EDUCATION/TRAINING PROGRAM

## 2024-07-26 PROCEDURE — 85025 COMPLETE CBC W/AUTO DIFF WBC: CPT | Performed by: NURSE PRACTITIONER

## 2024-07-26 RX ORDER — CLONAZEPAM 1 MG/1
1 TABLET ORAL 2 TIMES DAILY PRN
Qty: 7 TABLET | Refills: 0 | Status: SHIPPED | OUTPATIENT
Start: 2024-07-26

## 2024-07-26 RX ORDER — METHOCARBAMOL 750 MG/1
750 TABLET, FILM COATED ORAL 4 TIMES DAILY
Status: DISCONTINUED | OUTPATIENT
Start: 2024-07-26 | End: 2024-07-26 | Stop reason: HOSPADM

## 2024-07-26 RX ORDER — ATORVASTATIN CALCIUM 40 MG/1
40 TABLET, FILM COATED ORAL DAILY
Qty: 30 TABLET | Refills: 0 | Status: SHIPPED | OUTPATIENT
Start: 2024-07-27 | End: 2024-08-26

## 2024-07-26 RX ORDER — METRONIDAZOLE 500 MG/1
500 TABLET ORAL 3 TIMES DAILY
Qty: 15 TABLET | Refills: 0 | Status: SHIPPED | OUTPATIENT
Start: 2024-07-26 | End: 2024-07-31

## 2024-07-26 RX ORDER — OXYCODONE AND ACETAMINOPHEN 7.5; 325 MG/1; MG/1
1 TABLET ORAL EVERY 4 HOURS PRN
Qty: 5 TABLET | Refills: 0 | Status: SHIPPED | OUTPATIENT
Start: 2024-07-26

## 2024-07-26 RX ORDER — CEFDINIR 300 MG/1
300 CAPSULE ORAL 2 TIMES DAILY
Qty: 10 CAPSULE | Refills: 0 | Status: SHIPPED | OUTPATIENT
Start: 2024-07-26 | End: 2024-07-31

## 2024-07-26 RX ORDER — METOPROLOL SUCCINATE 25 MG/1
25 TABLET, EXTENDED RELEASE ORAL
Qty: 30 TABLET | Refills: 0 | Status: SHIPPED | OUTPATIENT
Start: 2024-07-27 | End: 2024-08-26

## 2024-07-26 RX ADMIN — APIXABAN 5 MG: 5 TABLET, FILM COATED ORAL at 08:06

## 2024-07-26 RX ADMIN — Medication 5000 UNITS: at 08:06

## 2024-07-26 RX ADMIN — Medication 10 ML: at 08:27

## 2024-07-26 RX ADMIN — IPRATROPIUM BROMIDE AND ALBUTEROL SULFATE 3 ML: .5; 3 SOLUTION RESPIRATORY (INHALATION) at 06:35

## 2024-07-26 RX ADMIN — METOPROLOL SUCCINATE 25 MG: 25 TABLET, EXTENDED RELEASE ORAL at 08:06

## 2024-07-26 RX ADMIN — PANTOPRAZOLE SODIUM 40 MG: 40 TABLET, DELAYED RELEASE ORAL at 08:07

## 2024-07-26 RX ADMIN — SERTRALINE 150 MG: 50 TABLET, FILM COATED ORAL at 08:06

## 2024-07-26 RX ADMIN — OXYCODONE HYDROCHLORIDE AND ACETAMINOPHEN 1 TABLET: 7.5; 325 TABLET ORAL at 06:33

## 2024-07-26 RX ADMIN — CYCLOBENZAPRINE 5 MG: 10 TABLET, FILM COATED ORAL at 08:07

## 2024-07-26 RX ADMIN — CETIRIZINE HYDROCHLORIDE 10 MG: 10 TABLET, FILM COATED ORAL at 08:06

## 2024-07-26 RX ADMIN — FERROUS SULFATE TAB EC 324 MG (65 MG FE EQUIVALENT) 324 MG: 324 (65 FE) TABLET DELAYED RESPONSE at 08:07

## 2024-07-26 RX ADMIN — FLUTICASONE PROPIONATE 2 SPRAY: 50 SPRAY, METERED NASAL at 08:05

## 2024-07-26 RX ADMIN — METRONIDAZOLE 500 MG: 5 INJECTION, SOLUTION INTRAVENOUS at 08:26

## 2024-07-26 RX ADMIN — SENNOSIDES AND DOCUSATE SODIUM 2 TABLET: 50; 8.6 TABLET ORAL at 08:06

## 2024-07-26 RX ADMIN — ATORVASTATIN CALCIUM 40 MG: 40 TABLET, FILM COATED ORAL at 08:06

## 2024-07-26 RX ADMIN — ONDANSETRON 4 MG: 2 INJECTION INTRAMUSCULAR; INTRAVENOUS at 08:05

## 2024-07-26 RX ADMIN — METHOCARBAMOL 750 MG: 750 TABLET, FILM COATED ORAL at 11:25

## 2024-07-26 RX ADMIN — OXYCODONE HYDROCHLORIDE AND ACETAMINOPHEN 1 TABLET: 7.5; 325 TABLET ORAL at 13:29

## 2024-07-26 RX ADMIN — DULOXETINE HYDROCHLORIDE 60 MG: 30 CAPSULE, DELAYED RELEASE ORAL at 08:06

## 2024-07-26 RX ADMIN — METRONIDAZOLE 500 MG: 5 INJECTION, SOLUTION INTRAVENOUS at 01:42

## 2024-07-26 RX ADMIN — BUDESONIDE 0.5 MG: 0.5 INHALANT RESPIRATORY (INHALATION) at 06:35

## 2024-07-26 RX ADMIN — AMIODARONE HYDROCHLORIDE 200 MG: 200 TABLET ORAL at 08:06

## 2024-07-26 RX ADMIN — CLOTRIMAZOLE AND BETAMETHASONE DIPROPIONATE 1 APPLICATION: 10; .5 CREAM TOPICAL at 08:07

## 2024-07-26 NOTE — PLAN OF CARE
Goal Outcome Evaluation:              Outcome Evaluation: Patient had frequent bowel movements overnight. Vital signs have remained stable and will continue to monitor. No overnight events to report.

## 2024-07-26 NOTE — PLAN OF CARE
Goal Outcome Evaluation:  Plan of Care Reviewed With: patient        Progress: improving  Outcome Evaluation: Pt lying supine upon OT arrival. Pt agreeable to tx. Pt able to complete supine to sit EOB with CGA. Pt able to complete STS with CGA to min A and able to complete fxl mobility to chair with HHA and CGA. Pt able to sit in chair with chair alarm activated and needs in reach. OT to continue per POC.

## 2024-07-26 NOTE — PROGRESS NOTES
"Dietitian Follow-up    Patient Name: Gabi Dudley  YOB: 1945  MRN: [id number]  Admission date: 7/22/2024    Comment:      Clinical Nutrition Follow-up   Encounter Information        Trending Narrative     7/26: Diet advanced to full liquids. RD will adjust ONS to boost plus BID.    7/25: Pt NPO/clear liquid x 3 days d/t diverticulitis and concern for bowel obstruction. Average PO intake 37%. Will add boost breeze BID to promote PO intake.      Anthropometrics        Current Height, Weight Height: 165.1 cm (65\")  Weight: 68 kg (149 lb 14.6 oz) (07/23/24 1445)       Trending Weight Hx     This admission:              PTA:     Wt Readings from Last 30 Encounters:   07/23/24 1445 68 kg (149 lb 14.6 oz)   07/23/24 1229 68 kg (149 lb 14.6 oz)   07/22/24 0941 68 kg (149 lb 14.6 oz)   07/10/24 1712 68 kg (150 lb)   06/27/24 2334 68 kg (150 lb)   06/13/24 1735 68 kg (150 lb)   06/05/24 1653 68 kg (150 lb)   01/25/24 1435 64.9 kg (143 lb)   01/09/24 1626 64.9 kg (143 lb)   12/11/23 1641 65.8 kg (145 lb)   10/31/23 1610 65.8 kg (145 lb)   09/26/23 1538 66.2 kg (146 lb)   09/19/23 1547 66.7 kg (147 lb 1.9 oz)   09/18/23 1305 67.6 kg (149 lb)   09/01/23 1057 67.6 kg (149 lb)   08/18/23 0400 75.3 kg (166 lb 0.1 oz)   08/17/23 0300 74.5 kg (164 lb 3.9 oz)   08/16/23 0300 76.7 kg (169 lb 1.5 oz)   08/14/23 0400 76.1 kg (167 lb 12.3 oz)   08/13/23 0300 77.2 kg (170 lb 3.1 oz)   08/12/23 0300 75.7 kg (166 lb 14.2 oz)   08/11/23 0300 76.4 kg (168 lb 6.9 oz)   08/10/23 0336 74 kg (163 lb 2.3 oz)   08/09/23 0444 75.1 kg (165 lb 9.1 oz)   08/08/23 0500 72.7 kg (160 lb 4.4 oz)   08/07/23 2001 72.7 kg (160 lb 4.4 oz)   08/07/23 1514 72.6 kg (160 lb)   07/30/23 1900 72.6 kg (160 lb)   07/20/23 1721 72.8 kg (160 lb 6.4 oz)   07/20/23 1524 72.6 kg (160 lb)   06/27/23 1519 73.5 kg (162 lb)   06/26/23 1332 73.5 kg (162 lb)   06/02/23 1525 73.5 kg (162 lb)   05/30/23 1339 73.2 kg (161 lb 6.4 oz)   05/26/23 2216 72.6 kg (160 lb) "   05/25/23 0425 75.3 kg (166 lb 0.1 oz)   05/24/23 0333 75.1 kg (165 lb 9.1 oz)   05/23/23 0328 77.6 kg (171 lb 1.2 oz)   05/22/23 1805 75.6 kg (166 lb 10.7 oz)   05/11/23 0500 75.6 kg (166 lb 10.7 oz)   05/10/23 0411 75.7 kg (166 lb 14.2 oz)   05/09/23 0500 74.1 kg (163 lb 5.8 oz)   05/08/23 0305 74.1 kg (163 lb 5.8 oz)   05/06/23 0447 75.4 kg (166 lb 3.6 oz)   05/05/23 0538 73.2 kg (161 lb 6 oz)   05/05/23 0057 72.6 kg (160 lb)   05/01/23 1940 72.6 kg (160 lb)   03/22/23 1902 72.6 kg (160 lb)   03/16/23 1955 72.6 kg (160 lb)   02/13/23 1152 73 kg (161 lb)   02/09/23 1343 72.1 kg (159 lb)   02/07/23 1457 72.6 kg (160 lb)      BMI kg/m2 Body mass index is 24.95 kg/m².     Labs        Pertinent Labs Results from last 7 days   Lab Units 07/26/24  0534 07/25/24  0657 07/24/24  2124 07/24/24  0839 07/23/24  0635 07/22/24  0929   SODIUM mmol/L 141 147*  --  141 145 142   POTASSIUM mmol/L 3.5 4.5 4.0 3.3* 3.5 3.5   CHLORIDE mmol/L 102 104  --  100 101 96*   CO2 mmol/L 34.8* 34.1*  --  32.4* 31.3* 36.4*   BUN mg/dL 19 22  --  18 16 17   CREATININE mg/dL 0.51* 0.51*  --  0.57 0.46* 0.65   CALCIUM mg/dL 8.6 8.7  --  8.9 8.8 8.7   BILIRUBIN mg/dL  --   --   --   --  0.2 0.2   ALK PHOS U/L  --   --   --   --  163* 192*   ALT (SGPT) U/L  --   --   --   --  20 18   AST (SGOT) U/L  --   --   --   --  56* 37*   GLUCOSE mg/dL 99 95  --  130* 105* 140*     Results from last 7 days   Lab Units 07/26/24  0534 07/24/24  0839 07/23/24  0635   MAGNESIUM mg/dL  --   --  1.8   PHOSPHORUS mg/dL  --   --  3.3   HEMOGLOBIN g/dL 9.1*   < > 10.3*   HEMATOCRIT % 31.0*   < > 34.6    < > = values in this interval not displayed.         Medications    Scheduled Medications amiodarone, 200 mg, Oral, Q24H  apixaban, 5 mg, Oral, BID  atorvastatin, 40 mg, Oral, Daily  budesonide, 0.5 mg, Nebulization, BID - RT  cefTRIAXone, 2,000 mg, Intravenous, Q24H  cetirizine, 10 mg, Oral, Daily  clotrimazole-betamethasone, 1 application , Topical, BID  DULoxetine,  60 mg, Oral, BID  ferrous sulfate, 324 mg, Oral, Daily With Breakfast  fluticasone, 2 spray, Nasal, Daily  ipratropium-albuterol, 3 mL, Nebulization, Q6H While Awake - RT  melatonin, 5 mg, Oral, Nightly  methocarbamol, 750 mg, Oral, 4x Daily  metoprolol succinate XL, 25 mg, Oral, Q24H  metroNIDAZOLE, 500 mg, Intravenous, Q8H  pantoprazole, 40 mg, Oral, Daily  QUEtiapine, 12.5 mg, Oral, Nightly  senna-docusate sodium, 2 tablet, Oral, BID  sertraline, 150 mg, Oral, Daily  sodium chloride, 10 mL, Intravenous, Q12H  vitamin D3, 5,000 Units, Oral, Daily        Infusions      PRN Medications   acetaminophen    albuterol    senna-docusate sodium **AND** polyethylene glycol **AND** bisacodyl **AND** bisacodyl    calcium carbonate    clonazePAM    cyclobenzaprine    Diclofenac Sodium    nitroglycerin    ondansetron ODT **OR** ondansetron    ondansetron ODT    oxyCODONE-acetaminophen    Potassium Replacement - Follow Nurse / BPA Driven Protocol    sodium chloride    sodium chloride    sodium chloride    sodium chloride    traZODone     Physical Findings        Trending Physical   Appearance, NFPE    --  Edema  None noted     Bowel Function LBM: 7/25     Tubes Peripheral IV     Chewing/Swallowing WNL     Skin WNL     --  Current Nutrition Orders & Evaluation of Intake       Oral Nutrition     Food Allergies NKFA   Current PO Diet Diet: Liquid; Full Liquid; Fluid Consistency: Thin (IDDSI 0)   Supplement Boost Breeze BID   PO Evaluation     Trending % PO Intake 75% x 2 meals     Nutrition Diagnosis         Nutrition Dx Problem 1 Altered GI function r/t diverticulitis AEB need for full liquid diet.       Nutrition Dx Problem 2        Intervention Goal         Intervention Goal(s) Maintain CBW  PO intake meet >50% of estimated needs  Diet advancement per MD  Adhere to ONS     Nutrition Intervention        RD Action Adjust ONS order     Nutrition Prescription          Diet Prescription Full liquid   Supplement Prescription Boost  plus BID   Enteral Nutrition Prescription     TPN Prescription       Monitor/Evaluation        Monitor Per protocol, I&O, PO intake, Supplement intake, Pertinent labs, Weight, Skin status, GI status, Symptoms, POC/GOC, Swallow function, Hemodynamic stability     RD to f/up    Electronically signed by:  Zandra Flores RD  07/26/24 11:51 EDT

## 2024-07-26 NOTE — THERAPY TREATMENT NOTE
Patient Name: Gabi Dudley  : 1945    MRN: [id number]                              Today's Date: 2024       Admit Date: 2024    Visit Dx:     ICD-10-CM ICD-9-CM   1. NSTEMI (non-ST elevated myocardial infarction)  I21.4 410.70   2. Diverticulitis  K57.92 562.11   3. Diastasis of rectus abdominis  M62.08 728.84   4. Abdominal pain, unspecified abdominal location  R10.9 789.00   5. Nausea and vomiting, unspecified vomiting type  R11.2 787.01   6. Anxiety  F41.9 300.00   7. Primary osteoarthritis of both knees  M17.0 715.16     Patient Active Problem List   Diagnosis    Adrenal adenoma    Anxiety    Chronic fatigue    Fibromyalgia    Hyperlipidemia    Essential hypertension    Insomnia    Osteoarthritis of knee    Osteoporosis    Pulmonary emphysema    Simple renal cyst    Tension headache    Vitamin D deficiency    Diverticulosis    Inversion of nipple    Paroxysmal atrial fibrillation    Coronary artery disease involving native coronary artery of native heart without angina pectoris    Autonomic dysfunction    Gastroesophageal reflux disease without esophagitis    Urge incontinence    Erythrasma    Compression fracture of T5 vertebra    Lung nodule    COPD (chronic obstructive pulmonary disease)    Overweight (BMI 25.0-29.9)    Acute on chronic respiratory failure with hypoxia    Acute on chronic respiratory failure with hypoxia and hypercapnia    Iron deficiency anemia    Impaired mobility and ADLs    Acute respiratory failure with hypoxia and hypercapnia    Aspiration pneumonia of right lower lobe    Partial small bowel obstruction    Moderate malnutrition    Bowel obstruction    Enteritis    Diverticulitis    Elevated troponin     Past Medical History:   Diagnosis Date    Adrenal adenoma     Anemia     Arrhythmia     Asthma     Atrial fibrillation     Back pain     Benign colonic polyp     Benign tumor of adrenal gland     Cataract     bilateral    Cholelithiasis     Chronic bronchitis      Chronic bronchitis with COPD (chronic obstructive pulmonary disease)     COPD (chronic obstructive pulmonary disease) 2005    Coronary artery disease     Depression     Diverticulosis Years ago    Elevated cholesterol     Environmental and seasonal allergies     Fibromyalgia     Fibromyalgia, primary Sometime in the 90s    Gastritis     Generalized anxiety disorder     GERD (gastroesophageal reflux disease)     H/O mammogram     Headache Years ago    Hemorrhoids     History of blood transfusion 1985    History of blood transfusion 1985    History of echocardiogram     History of endometriosis     History of nuclear stress test     Hospitalization or health care facility admission within last 6 months     5 times between 11/2022-05/2023    Hypertension     IBS (irritable bowel syndrome)     Impaired functional mobility, balance, gait, and endurance     Impaired mobility     Inverted nipple     Kidney disease     Kidney stone     Liver cyst     Low back pain Years ago    Nodular radiologic density     Nodule of left lung     On home oxygen therapy     2 liters NC QHS    Osteoarthritis     Osteopenia Several years ago    Osteoporosis     PONV (postoperative nausea and vomiting)     Renal cyst     Sinus problem     2014    Sinusitis     Skin cancer     basal cell carcinoma    SOB (shortness of breath)     Tobacco use     Urinary frequency     Urinary tract infection Years ago    Frequent UTIs    Vitamin D deficiency     Wears glasses     Wears partial dentures     upper plate     Past Surgical History:   Procedure Laterality Date    APPENDECTOMY  1980s    CARDIAC CATHETERIZATION  2003    CATARACT EXTRACTION  2013    both eyes    CHOLECYSTECTOMY  2020    CHOLECYSTECTOMY WITH INTRAOPERATIVE CHOLANGIOGRAM N/A 10/30/2020    Procedure: CHOLECYSTECTOMY LAPAROSCOPIC INTRAOPERATIVE CHOLANGIOGRAPHY;  Surgeon: Sima Moses MD;  Location: [facility];  Service: General;  Laterality: N/A;    COLON SURGERY  2020    COLONOSCOPY   03/11/2013    COLONOSCOPY  06/21/2016    COLONOSCOPY N/A 07/24/2019    Procedure: COLONOSCOPY W/ COLD FORCEP POLYPECTOMIES; HOT SNARE POLYPECTOMIES; COLD SNARE POLYPECTOMY;  Surgeon: Goyo Nunez MD;  Location: [facility] ENDOSCOPY;  Service: Gastroenterology    COLONOSCOPY W/ BIOPSIES AND POLYPECTOMY      EXPLORATORY LAPAROTOMY N/A 11/23/2020    Procedure: colectomy, right, closure of enterotomy x 2, reduction of internal volvulus;  Surgeon: Sima Moses MD;  Location: [facility] OR;  Service: General;  Laterality: N/A;    EXPLORATORY LAPAROTOMY N/A 8/9/2023    Procedure: LAPAROTOMY EXPLORATORY WITH LYIS OF ADHESIONS AND  CENTRAL LINE INSERTION;  Surgeon: Bianca Isaac DO;  Location: [facility] OR;  Service: General;  Laterality: N/A;    HYSTERECTOMY  1980s    partial    LYSIS OF ABDOMINAL ADHESIONS      TONSILLECTOMY  1987    UPPER GASTROINTESTINAL ENDOSCOPY  01/13/2015    VAGINAL DELIVERY      x2      General Information       Row Name 07/26/24 1303          Physical Therapy Time and Intention    Document Type therapy note (daily note)  -RM     Mode of Treatment physical therapy  -RM       Row Name 07/26/24 1303          General Information    Patient Profile Reviewed yes  -RM     Existing Precautions/Restrictions fall;oxygen therapy device and L/min  -RM       Row Name 07/26/24 1303          Cognition    Orientation Status (Cognition) oriented x 4  -RM       Row Name 07/26/24 1303          Safety Issues, Functional Mobility    Safety Issues Affecting Function (Mobility) safety precaution awareness;safety precautions follow-through/compliance  -RM     Impairments Affecting Function (Mobility) balance;endurance/activity tolerance;strength;shortness of breath;pain  -RM               User Key  (r) = Recorded By, (t) = Taken By, (c) = Cosigned By      Initials Name Provider Type    RM Bebeto David, PTA Physical Therapist Assistant                   Mobility       Row Name 07/26/24 1305          Bed Mobility     Bed Mobility supine-sit  -RM     Supine-Sit [place] (Bed Mobility) contact guard;verbal cues  -RM     Assistive Device (Bed Mobility) bed rails;head of bed elevated  -RM       Row Name 07/26/24 1305          Bed-Chair Transfer    Bed-Chair [place] (Transfers) contact guard;minimum assist (75% patient effort);verbal cues;nonverbal cues (demo/gesture)  -RM     Assistive Device (Bed-Chair Transfers) other (see comments)  gait belt  -RM       Row Name 07/26/24 1305          Sit-Stand Transfer    Sit-Stand [place] (Transfers) contact guard;minimum assist (75% patient effort);verbal cues  HHA  -RM     Assistive Device (Sit-Stand Transfers) other (see comments)  gait belt  -RM               User Key  (r) = Recorded By, (t) = Taken By, (c) = Cosigned By      Initials Name Provider Type    Bebeto Laird, PTA Physical Therapist Assistant                   Obj/Interventions    No documentation.                  Goals/Plan    No documentation.                  Clinical Impression       Row Name 07/26/24 1320          Pain    Pretreatment Pain Rating 8/10  -RM     Posttreatment Pain Rating 8/10  -RM     Pain Location - abdomen  -RM     Pain Intervention(s) Repositioned;Ambulation/increased activity  -RM       Row Name 07/26/24 1320          Plan of Care Review    Plan of Care Reviewed With patient  -RM     Progress improving  -RM     Outcome Evaluation Pt  supine in bed and  willing to participate withv treatment. Pt performed bed mobility with cga, STS from EOB with gait belt cga/min a and transferred bed to chair with cga/min a with use of gait belt. See flowsheet for details.  Cont PT per POC progressing to goals as pt tolerates.  -RM               User Key  (r) = Recorded By, (t) = Taken By, (c) = Cosigned By      Initials Name Provider Type    Bebeto Laird, PTA Physical Therapist Assistant                   Outcome Measures       Row Name 07/26/24 1324 07/26/24 0800       How much help from  another person do you currently need...    Turning from your back to your side while in flat bed without using bedrails? 4  -RM 4  -ML    Moving from lying on back to sitting on the side of a flat bed without bedrails? 4  -RM 4  -ML    Moving to and from a bed to a chair (including a wheelchair)? 3  -RM 3  -ML    Standing up from a chair using your arms (e.g., wheelchair, bedside chair)? 3  -RM 3  -ML    Climbing 3-5 steps with a railing? 2  -RM 2  -ML    To walk in hospital room? 3  -RM 2  -ML    AM-PAC 6 Clicks Score (PT) 19  -RM 18  -ML    Highest Level of Mobility Goal 6 --> Walk 10 steps or more  -RM 6 --> Walk 10 steps or more  -ML      Row Name 07/26/24 1324          Functional Assessment    Outcome Measure Options AM-PAC 6 Clicks Basic Mobility (PT)  -RM               User Key  (r) = Recorded By, (t) = Taken By, (c) = Cosigned By      Initials Name Provider Type    RM Bebeto David, PTA Physical Therapist Assistant    Socorro Odell RN Registered Nurse                                 Physical Therapy Education       Title: PT OT SLP Therapies (Resolved)       Topic: Physical Therapy (Resolved)       Point: Mobility training (Resolved)       Learning Progress Summary             Patient Acceptance, E, VU by MS at 7/25/2024 1257    Comment: importance of mobility                         Point: Home exercise program (Resolved)       Learning Progress Summary             Patient Acceptance, E, VU by MS at 7/25/2024 1257    Comment: importance of mobility                         Point: Body mechanics (Resolved)       Learner Progress:  Not documented in this visit.              Point: Precautions (Resolved)       Learner Progress:  Not documented in this visit.                              User Key       Initials Effective Dates Name Provider Type Discipline    MS 08/22/23 -  Farzad Crowder, PT Physical Therapist PT                  PT Recommendation and Plan     Plan of Care Reviewed With:  patient  Progress: improving  Outcome Evaluation: Pt  supine in bed and  willing to participate withv treatment. Pt performed bed mobility with cga, STS from EOB with gait belt cga/min a and transferred bed to chair with cga/min a with use of gait belt. See flowsheet for details.  Cont PT per POC progressing to goals as pt tolerates.     Time Calculation:         PT Charges       Row Name 07/26/24 1324             Time Calculation    Start Time 1159  -RM      Stop Time 1210  -RM      Time Calculation (min) 11 min  -RM      PT Received On 07/26/24  -RM      PT Goal Re-Cert Due Date 08/04/24  -RM         Time Calculation- PT    Total Timed Code Minutes- PT 11 minute(s)  -RM         Timed Charges    91400 - PT Therapeutic Activity Minutes 11  -RM         Total Minutes    Timed Charges Total Minutes 11  -RM       Total Minutes 11  -RM                User Key  (r) = Recorded By, (t) = Taken By, (c) = Cosigned By      Initials Name Provider Type    Bebeto Laird PTA Physical Therapist Assistant                  Therapy Charges for Today       Code Description Service Date Service Provider Modifiers Qty    11448309297  PT THERAPEUTIC ACT EA 15 MIN 7/26/2024 Bebeto David PTA GP 1            PT G-Codes  Outcome Measure Options: AM-PAC 6 Clicks Basic Mobility (PT)  AM-PAC 6 Clicks Score (PT): 19  AM-PAC 6 Clicks Score (OT): 14       Bebeto David PTA  7/26/2024

## 2024-07-26 NOTE — PROGRESS NOTES
LOS: 4 days   Patient Care Team:  Krystina Saunders DO as PCP - General (Family Medicine)  Sima Moses MD as Consulting Physician (General Surgery)  Taiwo Curry MD as Consulting Physician (Cardiology)       Chief Complaint: Diverticulitis, partial bowel obstruction    HPI: Patient seen and examined today at bedside. States she was up all night with diarrhea and people bothering her. Abdominal pain is improved. She is tolerating a diet and denies nausea/vomiting.    Vital Signs  Temp:  [98.5 °F (36.9 °C)-99.6 °F (37.6 °C)] 98.5 °F (36.9 °C)  Heart Rate:  [74-82] 78  Resp:  [18-22] 22  BP: (106-127)/(56-67) 127/65    Physical Exam:     General Appearance:  Alert and cooperative, not in any acute distress.   Respiratory/Lungs:   Breath sounds heard bilaterally equally.  No crackles or wheezing. No Pleural rub or bronchial breathing. Normal respiratory effort.    Cardiovascular/Heart:  Normal S1 and S2, no murmur. No edema   GI/Abdomen:   Soft, non-tender, non-distended             Musculoskeletal/ Extremities:   Moves all extremities well   Skin: No bleeding, bruising or rash, no induration   Psychiatric : Alert and oriented ×3.  No depression or anxiety    Neurologic: Cranial nerves 2 - 12 grossly intact, sensation intact, Motor power is normal and equal bilaterally.     Results Review:       Lab Results (last 24 hours)       Procedure Component Value Units Date/Time    Urine Culture - Urine, Urine, Clean Catch [[id number]]  (Normal) Collected: 07/24/24 1600    Specimen: Urine, Clean Catch Updated: 07/26/24 1230     Urine Culture No growth    CBC & Differential [[id number]]  (Abnormal) Collected: 07/26/24 0534    Specimen: Blood Updated: 07/26/24 0630    Narrative:      The following orders were created for panel order CBC & Differential.  Procedure                               Abnormality         Status                     ---------                               -----------         ------                      CBC Auto Differential[[id number]]        Abnormal            Final result               Scan Slide[[id number]]                                       Final result                 Please view results for these tests on the individual orders.    Scan Slide [[id number]] Collected: 07/26/24 0534    Specimen: Blood Updated: 07/26/24 0630     Hypochromia Slight/1+     Ovalocytes Slight/1+     Stomatocytes Slight/1+     WBC Morphology Normal     Platelet Estimate Adequate    CBC Auto Differential [[id number]]  (Abnormal) Collected: 07/26/24 0534    Specimen: Blood Updated: 07/26/24 0618     WBC 7.55 10*3/mm3      RBC 3.45 10*6/mm3      Hemoglobin 9.1 g/dL      Hematocrit 31.0 %      MCV 89.9 fL      MCH 26.4 pg      MCHC 29.4 g/dL      RDW 15.6 %      RDW-SD 50.6 fl      MPV 11.3 fL      Platelets 217 10*3/mm3      Neutrophil % 78.3 %      Lymphocyte % 12.6 %      Monocyte % 7.2 %      Eosinophil % 1.1 %      Basophil % 0.3 %      Immature Grans % 0.5 %      Neutrophils, Absolute 5.92 10*3/mm3      Lymphocytes, Absolute 0.95 10*3/mm3      Monocytes, Absolute 0.54 10*3/mm3      Eosinophils, Absolute 0.08 10*3/mm3      Basophils, Absolute 0.02 10*3/mm3      Immature Grans, Absolute 0.04 10*3/mm3      nRBC 0.0 /100 WBC     Basic Metabolic Panel [[id number]]  (Abnormal) Collected: 07/26/24 0534    Specimen: Blood Updated: 07/26/24 0614     Glucose 99 mg/dL      BUN 19 mg/dL      Creatinine 0.51 mg/dL      Sodium 141 mmol/L      Potassium 3.5 mmol/L      Chloride 102 mmol/L      CO2 34.8 mmol/L      Calcium 8.6 mg/dL      BUN/Creatinine Ratio 37.3     Anion Gap 4.2 mmol/L      eGFR 96.3 mL/min/1.73     Narrative:      GFR Normal >60  Chronic Kidney Disease <60  Kidney Failure <15    The GFR formula is only valid for adults with stable renal function between ages 18 and 70.    Blood Culture With LILIANA - Blood, Hand, Left [[id number]]  (Normal) Collected: 07/22/24 1337    Specimen: Blood from Hand, Left Updated: 07/25/24 1400      Blood Culture No growth at 3 days    Blood Culture With LILIANA - Blood, Arm, Left [[id number]]  (Normal) Collected: 07/22/24 1337    Specimen: Blood from Arm, Left Updated: 07/25/24 1400     Blood Culture No growth at 3 days                Assessment & Plan       Bowel obstruction    Coronary artery disease involving native coronary artery of native heart without angina pectoris    COPD (chronic obstructive pulmonary disease)    Impaired mobility and ADLs    Enteritis    Diverticulitis    Elevated troponin    Patient is a 79 year old female admitted to the hospital with sigmoid diverticulitis and concern for possible bowel obstruction. Patient has slowly improved throughout the week. She has minimal pain, is having bowel movements, and tolerating a diet without nausea/vomiting. Leukocytosis resolved. Patient is stable for discharge to rehab today from surgery standpoint.     Bianca Isaac DO  07/26/24  12:47 EDT

## 2024-07-26 NOTE — DISCHARGE SUMMARY
[facility]   DISCHARGE SUMMARY      Name:  Gabi Dudley   Age:  79 y.o.  Sex:  female  :  1945  MRN:  [id number]   Visit Number:  [id number]    Admission Date:  2024  Date of Discharge:  2024  Primary Care Physician:  Krystina Saunders,     Discharge Diagnoses:     Left lower quadrant pain, acute diverticulitis, POA  Concern for possible partial bowel obstruction, POA  Elevated troponin, POA  Cardiomyopathy  Coronary artery disease  COPD on 4 L  Compression fracture L3  Atrial fibrillation on Eliquis       Problem List:     Active Hospital Problems    Diagnosis  POA    **Bowel obstruction [K56.609]  Yes    Enteritis [K52.9]  Yes    Diverticulitis [K57.92]  Yes    Elevated troponin [R79.89]  Yes    Impaired mobility and ADLs [Z74.09, Z78.9]  Yes    COPD (chronic obstructive pulmonary disease) [J44.9]  Yes    Coronary artery disease involving native coronary artery of native heart without angina pectoris [I25.10]  Yes      Resolved Hospital Problems   No resolved problems to display.     Presenting Problem:    Chief Complaint   Patient presents with    Abdominal Pain     EMS reports pt c/o lower abd cramping and constipation worsening since last week. Pt has hx of recent fracture being treated with pain meds. Pt has hx bowel obstruction. Denies any N/V      Consults:     Consulting Physician(s)         Provider   Role Specialty     Taiwo Curry MD      Consulting Physician Cardiology     Bianca Isaac DO      Consulting Physician General Surgery          Procedures Performed:        History of presenting illness/Hospital Course:    Ms. Dudley is a pleasant 79-year-old female with pertinent past medical history of atrial fibrillation on Eliquis, anemia, depression, coronary artery disease, COPD on 4 L of oxygen at baseline, gastritis, GERD, prior bowel obstruction status post adhesiolysis with bowel resection and reduction of volvulus in  per   [place]. Patient presented to emergency department due to abdominal pain, nausea, vomiting, and diarrhea.   Upon ED presentation patient hemodynamically stable.  Pertinent labs and imaging included HS trop 289 and then 259, WBC 13.72, CT abdomen enteritis/diverticultis, mild obstruction, worsened L3 compression fracture.  General surgeon Dr. Isaac consulted.  Hospitalist consulted for further medical management.  Patient continued on Rocephin and Flagyl with severe pain noted in left lower quadrant likely diverticulitis.  Patient continued with supportive IV fluids/Zofran/pain control.  Troponin was noted to increase to 375 with a.m. labs.  Cardiology consulted.  Patient with intermittent chest pain noted over the past several weeks.  Cardiology suggested coronary angiogram with patient refusing at this time.  Cardiology recommended aspirin 81 mg daily, atorvastatin 40 mg at bedtime, transition diltiazem to low-dose metoprolol with echo pending.  Unfortunately echo noted EF 36 to 40% with multiple regional wall motion abnormalities.  Patient noted to have worsening abdominal pain with repeat CT ordered that revealed mild worsening of diverticulitis.  Fortunately patient with improvement in symptoms and tolerating clear liquid diet.  Blood and urine cultures remain negative.  Patient continued on oral Flagyl and Omnicef for treatment of diverticulitis.  Diet advanced as tolerated.  Follow-up with primary physician.  Follow-up with cardiology.  Patient already has follow-up arranged with orthopedic surgery, Dr. Kapadia.  Discharged to short-term rehab.    Vital Signs:    Temp:  [98.5 °F (36.9 °C)-99.6 °F (37.6 °C)] 98.5 °F (36.9 °C)  Heart Rate:  [74-82] 78  Resp:  [18-22] 22  BP: (106-127)/(56-67) 127/65    Physical Exam:    General Appearance:  Alert and cooperative.  Chronically ill-appearing, no acute distress   Head:  Atraumatic and normocephalic.   Eyes: Conjunctivae and sclerae normal, no icterus. No pallor.    Ears:  Ears with no abnormalities noted.   Throat: No oral lesions, no thrush, oral mucosa moist.   Neck: Supple, trachea midline, no thyromegaly.       Lungs:   Breath sounds heard bilaterally equally.  No crackles or wheezing. No Pleural rub or bronchial breathing.   Heart:  Normal S1 and S2, no murmur, No JVD.   Abdomen:   Normal bowel sounds, Soft, nontender, nondistended, no rebound tenderness.   Extremities: Supple, no edema, no cyanosis, no clubbing.   Pulses: Pulses palpable bilaterally.   Skin: No bleeding or rash.   Neurologic: Alert and oriented x 3. No facial asymmetry. Moves all four limbs.      Pertinent Lab Results:     Results from last 7 days   Lab Units 07/26/24  0534 07/25/24  0657 07/24/24  2124 07/24/24  0839 07/23/24  0635 07/22/24  0929   SODIUM mmol/L 141 147*  --  141 145 142   POTASSIUM mmol/L 3.5 4.5 4.0 3.3* 3.5 3.5   CHLORIDE mmol/L 102 104  --  100 101 96*   CO2 mmol/L 34.8* 34.1*  --  32.4* 31.3* 36.4*   BUN mg/dL 19 22  --  18 16 17   CREATININE mg/dL 0.51* 0.51*  --  0.57 0.46* 0.65   CALCIUM mg/dL 8.6 8.7  --  8.9 8.8 8.7   BILIRUBIN mg/dL  --   --   --   --  0.2 0.2   ALK PHOS U/L  --   --   --   --  163* 192*   ALT (SGPT) U/L  --   --   --   --  20 18   AST (SGOT) U/L  --   --   --   --  56* 37*   GLUCOSE mg/dL 99 95  --  130* 105* 140*     Results from last 7 days   Lab Units 07/26/24  0534 07/25/24  0657 07/24/24  0839   WBC 10*3/mm3 7.55 9.65 12.85*   HEMOGLOBIN g/dL 9.1* 9.3* 10.2*   HEMATOCRIT % 31.0* 31.7* 34.2   PLATELETS 10*3/mm3 217 236 257         Results from last 7 days   Lab Units 07/23/24  0635 07/22/24  1124 07/22/24  0929   HSTROP T ng/L 375* 259* 289*             Results from last 7 days   Lab Units 07/22/24  0929   LIPASE U/L 10*         Results from last 7 days   Lab Units 07/24/24  1600 07/22/24  1337   BLOODCX   --  No growth at 3 days  No growth at 3 days   URINECX  No growth  --        Pertinent Radiology Results:    Imaging Results (All)       Procedure  Component Value Units Date/Time    CT Abdomen Pelvis Without Contrast [[id number]] Collected: 07/24/24 1405     Updated: 07/24/24 1415    Narrative:      PROCEDURE: CT ABDOMEN PELVIS WO CONTRAST-     HISTORY: ABD pain; I21.4-Non-ST elevation (NSTEMI) myocardial  infarction; K57.92-Diverticulitis of intestine, part unspecified,  without perforation or abscess without bleeding; M62.08-Separation of  muscle (nontraumatic), other site; R10.9-Unspecified abdominal pain;  R11.2-Nausea with vomiting, unspecified, diagnosed with sigmoid  diverticulitis 2 days prior.     COMPARISON: 2 days prior.     PROCEDURE: Axial images were obtained from the lung bases to the pubic  symphysis by computed tomography. This study was performed with  techniques to keep radiation doses as low as reasonably achievable,  (ALARA). Individualized dose reduction techniques using automated  exposure control or adjustment of mA and/or kV according to the patient  size were employed.     FINDINGS:     ABDOMEN: The lung bases are clear. The heart is proper size. The limited  non-contrast images of the liver are unremarkable. There are metallic  surgical clips in the right upper quadrant consistent with previous  cholecystectomy. The spleen is unremarkable. No adrenal masses are seen.  The aorta is normal in caliber. There is no significant free fluid or  adenopathy.  There is no nephrolithiasis. There is no hydronephrosis.  Noted are bilateral, circumscribed, hypodense renal lesions consistent  with cysts; rim calcification is noted and the largest cyst on the  right; findings are stable. Vascular calcifications noted.     PELVIS: The GI tract again demonstrates moderate stool burden in the  ascending and proximal half of the transverse colon extending up to the  midline abdominal wall diastases; this is not significantly changed from  the prior exam. There are small bowel loops in the left side of the  abdomen which are upper limits of normal,  increased from prior exam.  Again noted is the infiltration of the fat surrounding the mid sigmoid  colon at the location of multiple diverticula. This has extended  proximally with new wall thickening of the proximal/mid sigmoid colon.  No definite free fluid is seen. No free air is identified. No definite  extraluminal air identified. No rim-enhancing fluid collection seen. The  appendix is not identified. The urinary bladder is partially filled and  appears normal. There is no fluid or adenopathy. Severe L3 compression  fracture again noted.       Impression:      Mildly worsened sigmoid diverticulitis compared to prior  exam with no abscess identified at this time.              CTDI: 5.85 mGy  DLP:265.43 mGy.cm     This report was signed and finalized on 7/24/2024 2:13 PM by Angeles Collins MD.       CT Abdomen Pelvis With Contrast [[id number]] Collected: 07/22/24 1321     Updated: 07/22/24 1329    Narrative:      PROCEDURE: CT ABDOMEN PELVIS W CONTRAST-     HISTORY:  concern for bowel obstruction, h/o multiple bowel obstructions  in the past     COMPARISON: June 13, 2024.     TECHNIQUE: Multiple axial CT images were obtained from the lung bases  through the pubic symphysis following the administration of Isovue 300  and oral contrast.     FINDINGS:     ABDOMEN: The lung bases are clear. The heart is normal in size. The  liver shows a stable hypodense hepatic cyst in the subdiaphragmatic left  lobe. There is mild intra and extrahepatic biliary ductal dilatation  with cholecystectomy clips. Findings are stable. Stable bilateral  well-circumscribed hypodense renal lesions are seen compatible with  cysts. 1 of these shows rim calcification which is stable. Vascular  calcifications are present. There is no aortic aneurysm. There is mild  dilation of the pancreatic duct which is stable. There is stable  appearance of the midline diastases of the abdominal wall containing  colon. There is a moderate stool burden of the  ascending and proximal  transverse colon to the level of the diastases. There are decompressed  distal bowel loops to this. Findings may represent some level of  obstruction. There is wall thickening of jejunal loops in the left  abdomen possibly due to enteritis. The spleen is unremarkable. No  adrenal mass is present.  The pancreas is normal. The aorta is normal in  caliber. There is a severe compression fracture of L3 which appeared  mild on the previous exam.     PELVIS: The appendix is not identified. There is colonic diverticulosis.  There is new mild wall thickening of the proximal sigmoid colon and mild  infiltration of the adjacent surrounding fat consistent with  diverticulitis. The urinary bladder is unremarkable. There is a minimal  amount of free fluid in the pelvis. No rim-enhancing fluid collection is  identified. There is an ileocolic anastomosis in the right lower  quadrant. The appendix is surgically absent.        Impression:      1. Sigmoid diverticulitis.  2. Partial mild obstruction of the transverse colon secondary to  abdominal wall diastases.  3. Significant worsening of L3 compression fracture.  4. Jejunal wall thickening may be due to possible enteritis.        CTDI: 5.29 mGy  DLP: 249.1 mGy.cm        This study was performed with techniques to keep radiation doses as low  as reasonably achievable (ALARA). Individualized dose reduction  techniques using automated exposure control or adjustment of mA and/or  kV according to the patient size were employed.                 Images were reviewed, interpreted, and dictated by Dr. Angeles Collins MD  Transcribed by Reyna Gerard PA-C.     This report was signed and finalized on 7/22/2024 1:27 PM by Angeles Collins MD.               Echo:    Results for orders placed during the hospital encounter of 07/22/24    Adult Transthoracic Echo Limited W/ Cont if Necessary Per Protocol    Interpretation Summary    Left ventricular systolic function is mildly  to moderately decreased. Left ventricular ejection fraction appears to be 36 - 40%.    Regional wall motion abnormalities as below.    No left ventricular thrombus identified by contrasted study.    Condition on Discharge:      Stable.    Code status during the hospital stay:    Code Status and Medical Interventions:   Ordered at: 07/22/24 1625     Code Status (Patient has no pulse and is not breathing):    CPR (Attempt to Resuscitate)     Medical Interventions (Patient has pulse or is breathing):    Full Support     Discharge Disposition:    Rehab Facility or Unit (DC - External)    Discharge Medications:       Discharge Medications        New Medications        Instructions Start Date   cefdinir 300 MG capsule  Commonly known as: OMNICEF   300 mg, Oral, 2 Times Daily      metoprolol succinate XL 25 MG 24 hr tablet  Commonly known as: TOPROL-XL   25 mg, Oral, Every 24 Hours Scheduled   Start Date: July 27, 2024     metroNIDAZOLE 500 MG tablet  Commonly known as: Flagyl   500 mg, Oral, 3 Times Daily      oxyCODONE-acetaminophen 7.5-325 MG per tablet  Commonly known as: PERCOCET   1 tablet, Oral, Every 4 Hours PRN             Changes to Medications        Instructions Start Date   albuterol sulfate  (90 Base) MCG/ACT inhaler  Commonly known as: PROVENTIL HFA;VENTOLIN HFA;PROAIR HFA  What changed: how much to take   2 puffs, Inhalation, Every 4 Hours PRN      atorvastatin 40 MG tablet  Commonly known as: LIPITOR  What changed:   medication strength  how much to take   40 mg, Oral, Daily   Start Date: July 27, 2024            Continue These Medications        Instructions Start Date   amiodarone 200 MG tablet  Commonly known as: PACERONE   200 mg, Oral, Every 24 Hours Scheduled      apixaban 5 MG tablet tablet  Commonly known as: Eliquis   5 mg, Oral, Every 12 Hours      Breztri Aerosphere 160-9-4.8 MCG/ACT aerosol inhaler  Generic drug: Budeson-Glycopyrrol-Formoterol   2 puffs, Inhalation, 2 Times Daily, Rinse  mouth out after use      cetirizine 10 MG tablet  Commonly known as: zyrTEC   10 mg, Oral, Daily      clonazePAM 1 MG tablet  Commonly known as: KlonoPIN   1 mg, Oral, 2 Times Daily PRN      clotrimazole-betamethasone 1-0.05 % cream  Commonly known as: Lotrisone   1 Application, Topical, 2 Times Daily      Diclofenac Sodium 1 % gel gel  Commonly known as: VOLTAREN   4 g, Topical, 4 Times Daily PRN      DULoxetine 60 MG capsule  Commonly known as: CYMBALTA   60 mg, Oral, 2 Times Daily      fluticasone 50 MCG/ACT nasal spray  Commonly known as: FLONASE   2 sprays, Nasal, Daily      ipratropium-albuterol 0.5-2.5 mg/3 ml nebulizer  Commonly known as: DUO-NEB   USE 3 mL VIA NEBULIZER EVERY 4 HOURS AS NEEDED FOR WHEEZING      methocarbamol 750 MG tablet  Commonly known as: ROBAXIN   750 mg, Oral, 4 Times Daily      O2  Commonly known as: OXYGEN   2 L/min, Inhalation, As Needed      ondansetron ODT 4 MG disintegrating tablet  Commonly known as: ZOFRAN-ODT   4 mg, Translingual, Every 8 Hours PRN      pantoprazole 40 MG EC tablet  Commonly known as: PROTONIX   40 mg, Oral, Daily      QUEtiapine 25 MG tablet  Commonly known as: SEROquel   12.5 mg, Oral, Nightly      sertraline 100 MG tablet  Commonly known as: ZOLOFT   TAKE 1 & 1/2 TABLETS BY MOUTH DAILY      sodium chloride 0.65 % nasal spray   2 sprays, Nasal, As Needed      traZODone 100 MG tablet  Commonly known as: DESYREL   TAKE 1 TABLET EVERY NIGHT AT BEDTIME BY MOUTH AS NEEDED             Stop These Medications      dilTIAZem  MG 24 hr capsule  Commonly known as: CARDIZEM CD            Discharge Diet:     Diet Instructions       Diet: Liquid Diets; Full Liquid; Regular (IDDSI 7); Thin (IDDSI 0)      Discharge Diet: Liquid Diets    Liquid Diet: Full Liquid    Texture: Regular (IDDSI 7)    Fluid Consistency: Thin (IDDSI 0)          Activity at Discharge:     Activity Instructions       Activity as Tolerated            Follow-up Appointments:    Additional  Instructions for the Follow-ups that You Need to Schedule       Discharge Follow-up with PCP   As directed       Currently Documented PCP:    Krystina Saunders DO    PCP Phone Number:    [phone]     Follow Up Details: 1 week        Discharge Follow-up with Specified Provider: Dr Amor; 2 Weeks   As directed      To: Dr Amor   Follow Up: 2 Weeks               Contact information for follow-up providers       Krystina Saunders DO .    Specialty: Family Medicine  Why: 1 week  Contact information:  [address]  [address]  [facility] [address]  [phone]                       Contact information for after-discharge care       Destination       [facility] .    Service: Skilled Nursing  Contact information:  [address] [address]  [phone]                                 Future Appointments   Date Time Provider Department Center   7/29/2024  4:00 PM Krystina Saunders DO [facility]   8/16/2024  3:15 PM Doris Juárez APRN [facility]   11/6/2024  4:00 PM Taiwo Curry MD [facility]     Test Results Pending at Discharge:    Pending Labs       Order Current Status    Blood Culture With LILIANA - Blood, Arm, Left Preliminary result    Blood Culture With LILIANA - Blood, Hand, Left Preliminary result    Urine Culture - Urine, Urine, Clean Catch Preliminary result               George Durant DO  07/26/24  12:27 EDT    Time: I spent >30 minutes on this discharge activity which included: face-to-face encounter with the patient, reviewing the data in the system, coordination of the care with the nursing staff as well as consultants, documentation, and entering orders.     Dictated utilizing Dragon dictation.

## 2024-07-26 NOTE — CASE MANAGEMENT/SOCIAL WORK
10:47 DCP; [facility]. Met with pt and her daughter at bedside. Awaiting word on pt being able to go to [facility]. No other needs at this time. CM continues to follow.  12:30 Pt has been accepted to [facility], daughter updated. Pt medically ready and will be transferred today.

## 2024-07-26 NOTE — CASE MANAGEMENT/SOCIAL WORK
Case Management/Social Work    Patient Name:  Gabi Dudley  YOB: 1945  MRN: [id number]  Admit Date:  7/22/2024        LIBIA spoke with Cris/Eusebio Izquierdo who is reviewing pts medications and will call this SW back to determine if able to offer bed or not. LIBIA Following.     11:31 EDT Zac at [facility] states they can offer bed.     11:47 EDT LIBIA spoke with again with [facility] bed who states medications look good and Cris asked for updated notes from surgeon. Pt likely able to dc today to facility. LIBIA has been updating team in secure chat. LIBIA following.     12:17 EDT Eusebio Izquierdo called back. Pt able to admit today to facility. LIBIA updated team.       Electronically signed by:  SAGAR Willard  07/26/24 10:03 EDT

## 2024-07-26 NOTE — THERAPY TREATMENT NOTE
Patient Name: Gabi Dudley  : 1945    MRN: [id number]                              Today's Date: 2024       Admit Date: 2024    Visit Dx:     ICD-10-CM ICD-9-CM   1. NSTEMI (non-ST elevated myocardial infarction)  I21.4 410.70   2. Diverticulitis  K57.92 562.11   3. Diastasis of rectus abdominis  M62.08 728.84   4. Abdominal pain, unspecified abdominal location  R10.9 789.00   5. Nausea and vomiting, unspecified vomiting type  R11.2 787.01   6. Anxiety  F41.9 300.00   7. Primary osteoarthritis of both knees  M17.0 715.16     Patient Active Problem List   Diagnosis    Adrenal adenoma    Anxiety    Chronic fatigue    Fibromyalgia    Hyperlipidemia    Essential hypertension    Insomnia    Osteoarthritis of knee    Osteoporosis    Pulmonary emphysema    Simple renal cyst    Tension headache    Vitamin D deficiency    Diverticulosis    Inversion of nipple    Paroxysmal atrial fibrillation    Coronary artery disease involving native coronary artery of native heart without angina pectoris    Autonomic dysfunction    Gastroesophageal reflux disease without esophagitis    Urge incontinence    Erythrasma    Compression fracture of T5 vertebra    Lung nodule    COPD (chronic obstructive pulmonary disease)    Overweight (BMI 25.0-29.9)    Acute on chronic respiratory failure with hypoxia    Acute on chronic respiratory failure with hypoxia and hypercapnia    Iron deficiency anemia    Impaired mobility and ADLs    Acute respiratory failure with hypoxia and hypercapnia    Aspiration pneumonia of right lower lobe    Partial small bowel obstruction    Moderate malnutrition    Bowel obstruction    Enteritis    Diverticulitis    Elevated troponin     Past Medical History:   Diagnosis Date    Adrenal adenoma     Anemia     Arrhythmia     Asthma     Atrial fibrillation     Back pain     Benign colonic polyp     Benign tumor of adrenal gland     Cataract     bilateral    Cholelithiasis     Chronic bronchitis      Chronic bronchitis with COPD (chronic obstructive pulmonary disease)     COPD (chronic obstructive pulmonary disease) 2005    Coronary artery disease     Depression     Diverticulosis Years ago    Elevated cholesterol     Environmental and seasonal allergies     Fibromyalgia     Fibromyalgia, primary Sometime in the 90s    Gastritis     Generalized anxiety disorder     GERD (gastroesophageal reflux disease)     H/O mammogram     Headache Years ago    Hemorrhoids     History of blood transfusion 1985    History of blood transfusion 1985    History of echocardiogram     History of endometriosis     History of nuclear stress test     Hospitalization or health care facility admission within last 6 months     5 times between 11/2022-05/2023    Hypertension     IBS (irritable bowel syndrome)     Impaired functional mobility, balance, gait, and endurance     Impaired mobility     Inverted nipple     Kidney disease     Kidney stone     Liver cyst     Low back pain Years ago    Nodular radiologic density     Nodule of left lung     On home oxygen therapy     2 liters NC QHS    Osteoarthritis     Osteopenia Several years ago    Osteoporosis     PONV (postoperative nausea and vomiting)     Renal cyst     Sinus problem     2014    Sinusitis     Skin cancer     basal cell carcinoma    SOB (shortness of breath)     Tobacco use     Urinary frequency     Urinary tract infection Years ago    Frequent UTIs    Vitamin D deficiency     Wears glasses     Wears partial dentures     upper plate     Past Surgical History:   Procedure Laterality Date    APPENDECTOMY  1980s    CARDIAC CATHETERIZATION  2003    CATARACT EXTRACTION  2013    both eyes    CHOLECYSTECTOMY  2020    CHOLECYSTECTOMY WITH INTRAOPERATIVE CHOLANGIOGRAM N/A 10/30/2020    Procedure: CHOLECYSTECTOMY LAPAROSCOPIC INTRAOPERATIVE CHOLANGIOGRAPHY;  Surgeon: Sima Moses MD;  Location: [facility];  Service: General;  Laterality: N/A;    COLON SURGERY  2020    COLONOSCOPY   03/11/2013    COLONOSCOPY  06/21/2016    COLONOSCOPY N/A 07/24/2019    Procedure: COLONOSCOPY W/ COLD FORCEP POLYPECTOMIES; HOT SNARE POLYPECTOMIES; COLD SNARE POLYPECTOMY;  Surgeon: Goyo Nunez MD;  Location: [facility] ENDOSCOPY;  Service: Gastroenterology    COLONOSCOPY W/ BIOPSIES AND POLYPECTOMY      EXPLORATORY LAPAROTOMY N/A 11/23/2020    Procedure: colectomy, right, closure of enterotomy x 2, reduction of internal volvulus;  Surgeon: Sima Moses MD;  Location: [facility] OR;  Service: General;  Laterality: N/A;    EXPLORATORY LAPAROTOMY N/A 8/9/2023    Procedure: LAPAROTOMY EXPLORATORY WITH LYIS OF ADHESIONS AND  CENTRAL LINE INSERTION;  Surgeon: Bianca Isaac DO;  Location: [facility] OR;  Service: General;  Laterality: N/A;    HYSTERECTOMY  1980s    partial    LYSIS OF ABDOMINAL ADHESIONS      TONSILLECTOMY  1987    UPPER GASTROINTESTINAL ENDOSCOPY  01/13/2015    VAGINAL DELIVERY      x2      General Information       Row Name 07/26/24 1437          OT Time and Intention    Document Type therapy note (daily note)  -DB     Mode of Treatment occupational therapy  -DB               User Key  (r) = Recorded By, (t) = Taken By, (c) = Cosigned By      Initials Name Provider Type    DB Nicholas Fields OT Occupational Therapist                     Mobility/ADL's       Row Name 07/26/24 1437          Bed Mobility    Bed Mobility supine-sit  -DB     Supine-Sit [place] (Bed Mobility) contact guard;verbal cues  -DB     Assistive Device (Bed Mobility) bed rails;head of bed elevated  -DB       Row Name 07/26/24 1437          Bed-Chair Transfer    Bed-Chair [place] (Transfers) contact guard;minimum assist (75% patient effort);verbal cues;nonverbal cues (demo/gesture)  -DB       Row Name 07/26/24 1437          Sit-Stand Transfer    Sit-Stand [place] (Transfers) contact guard;minimum assist (75% patient effort);verbal cues  -DB     Assistive Device (Sit-Stand Transfers) --  HHA and gait belt  -DB                User Key  (r) = Recorded By, (t) = Taken By, (c) = Cosigned By      Initials Name Provider Type    Nicholas Elaine OT Occupational Therapist                   Obj/Interventions    No documentation.                  Goals/Plan    No documentation.                  Clinical Impression       Row Name 07/26/24 1446          Pain Assessment    Pretreatment Pain Rating 8/10  -DB     Posttreatment Pain Rating 8/10  -DB     Pain Location generalized  -DB     Pain Location - abdomen  -DB     Pain Intervention(s) Ambulation/increased activity;Repositioned  -DB       Row Name 07/26/24 1446          Plan of Care Review    Plan of Care Reviewed With patient  -DB     Progress improving  -DB     Outcome Evaluation Pt lying supine upon OT arrival. Pt agreeable to tx. Pt able to complete supine to sit EOB with CGA. Pt able to complete STS with CGA to min A and able to complete fxl mobility to chair with HHA and CGA. Pt able to sit in chair with chair alarm activated and needs in reach. OT to continue per POC.  -DB       Row Name 07/26/24 1446          Positioning and Restraints    Pre-Treatment Position in bed  -DB     Post Treatment Position chair  -DB     In Chair reclined;call light within reach;encouraged to call for assist;exit alarm on  -DB               User Key  (r) = Recorded By, (t) = Taken By, (c) = Cosigned By      Initials Name Provider Type    Nicholas Elaine OT Occupational Therapist                   Outcome Measures       Row Name 07/26/24 1447          How much help from another is currently needed...    Putting on and taking off regular lower body clothing? 2  -DB     Bathing (including washing, rinsing, and drying) 2  -DB     Toileting (which includes using toilet bed pan or urinal) 2  -DB     Putting on and taking off regular upper body clothing 3  -DB     Taking care of personal grooming (such as brushing teeth) 2  -DB     Eating meals 3  -DB     AM-PAC 6 Clicks Score (OT) 14   -DB       Row Name 07/26/24 1324 07/26/24 0800       How much help from another person do you currently need...    Turning from your back to your side while in flat bed without using bedrails? 4  -RM 4  -ML    Moving from lying on back to sitting on the side of a flat bed without bedrails? 4  -RM 4  -ML    Moving to and from a bed to a chair (including a wheelchair)? 3  -RM 3  -ML    Standing up from a chair using your arms (e.g., wheelchair, bedside chair)? 3  -RM 3  -ML    Climbing 3-5 steps with a railing? 2  -RM 2  -ML    To walk in hospital room? 3  -RM 2  -ML    AM-PAC 6 Clicks Score (PT) 19  -RM 18  -ML    Highest Level of Mobility Goal 6 --> Walk 10 steps or more  -RM 6 --> Walk 10 steps or more  -ML      Row Name 07/26/24 1449 07/26/24 1324       Functional Assessment    Outcome Measure Options AM-PAC 6 Clicks Daily Activity (OT)  -DB AM-PAC 6 Clicks Basic Mobility (PT)  -RM              User Key  (r) = Recorded By, (t) = Taken By, (c) = Cosigned By      Initials Name Provider Type    RM Bebeto David, PTA Physical Therapist Assistant    Socorro Odell RN Registered Nurse    Nicholas Elaine, OT Occupational Therapist                    Occupational Therapy Education       Title: PT OT SLP Therapies (Resolved)       Topic: Occupational Therapy (Resolved)       Point: ADL training (Resolved)       Description:   Instruct learner(s) on proper safety adaptation and remediation techniques during self care or transfers.   Instruct in proper use of assistive devices.                  Learning Progress Summary             Patient Acceptance, E,TB, VU by SD at 7/25/2024 1320    Comment: OT POC                         Point: Home exercise program (Resolved)       Description:   Instruct learner(s) on appropriate technique for monitoring, assisting and/or progressing therapeutic exercises/activities.                  Learner Progress:  Not documented in this visit.              Point: Precautions  (Resolved)       Description:   Instruct learner(s) on prescribed precautions during self-care and functional transfers.                  Learner Progress:  Not documented in this visit.              Point: Body mechanics (Resolved)       Description:   Instruct learner(s) on proper positioning and spine alignment during self-care, functional mobility activities and/or exercises.                  Learner Progress:  Not documented in this visit.                              User Key       Initials Effective Dates Name Provider Type Discipline    SD 06/16/21 -  Aimee Allan OT Occupational Therapist OT                  OT Recommendation and Plan     Plan of Care Review  Plan of Care Reviewed With: patient  Progress: improving  Outcome Evaluation: Pt lying supine upon OT arrival. Pt agreeable to tx. Pt able to complete supine to sit EOB with CGA. Pt able to complete STS with CGA to min A and able to complete fxl mobility to chair with HHA and CGA. Pt able to sit in chair with chair alarm activated and needs in reach. OT to continue per POC.     Time Calculation:         Time Calculation- OT       Row Name 07/26/24 1450             Time Calculation- OT    OT Start Time 1158  -DB      OT Stop Time 1216  -DB      OT Time Calculation (min) 18 min  -DB      OT Received On 07/26/24  -DB      OT Goal Re-Cert Due Date 08/06/24  -DB         Timed Charges    09369 - OT Therapeutic Activity Minutes 18  -DB         Total Minutes    Timed Charges Total Minutes 18  -DB       Total Minutes 18  -DB                User Key  (r) = Recorded By, (t) = Taken By, (c) = Cosigned By      Initials Name Provider Type    DB Nicholas Fields OT Occupational Therapist                  Therapy Charges for Today       Code Description Service Date Service Provider Modifiers Qty    [id number]  OT THERAPEUTIC ACT EA 15 MIN 7/26/2024 Nicholas Fields OT GO 1                 Nicholas Fields OT  7/26/2024

## 2024-07-26 NOTE — CASE MANAGEMENT/SOCIAL WORK
Case Management Discharge Note      Final Note: Pt discharging to [facility] via [facility].    Provided Post Acute Provider List?: Yes  Post Acute Provider List: Nursing Home  Provided Post Acute Provider Quality & Resource List?: Yes  Post Acute Provider Quality and Resource List: Home Health  Delivered To: Patient  Method of Delivery: In person    Selected Continued Care - Admitted Since 7/22/2024       Destination Coordination complete.      Service Provider Selected Services Address Phone Fax Patient Preferred    [address] [address] [phone] --              Durable Medical Equipment    No services have been selected for the patient.                Dialysis/Infusion    No services have been selected for the patient.                Home Medical Care    No services have been selected for the patient.                Therapy    No services have been selected for the patient.                Community Resources    No services have been selected for the patient.                Community & DME    No services have been selected for the patient.                    Transportation Services  Ambulance: [facility]    Final Discharge Disposition Code: 61 - hospital-based [facility]   October 3, 2017         Trinidad Valencia PA-C   [facility], [address], [address]   [place] [address]      RE:  Ramos Oconnor, MR# [id number],  1931      Dear Ms. ArteagaBoy:      It was a pleasure participating in the care of your patient, Mr. Ramos Oconnor.  As you know, he is an 86-year-old gentleman who I see today for congestive heart failure.      His past medical history is significant for the followin.  Type 1 diabetes.   2.  Hypertension.   3.  End-stage renal insufficiency, with a baseline GFR of 27 mL/min as of 2017.   4.  Hiatal hernia.   5.  Hypercoagulable state, status post TIA/stroke in the past with left facial numbness.   6.  Benign prostatic hypertrophy.   7.  Seizure disorder.   8.  Prior history of alcohol abuse.      Coronary angiogram, 2010, revealed the following:   Left main:  Normal.   LAD:  Mild proximal and mid disease with prior stent placement in , with 50% stenosis post-stent also identified.   Circumflex:  Mild disease.   RCA:  Normal.      He was admitted for diastolic heart failure in , and had swelling in his belly which became painful and distended.  His hands then swelled, and finally his ankles in the end, with significant weight gain.  Diuresis resulted in good resolution of symptoms.      He has also been followed for permanent atrial fibrillation.  His CHADS-VASc2 score is 6 for congestive heart failure, age, hypertension, diabetes, and prior neurologic event.      I last saw him on 2017.  At that time he was doing adequately.  He presents today for continuing care.      He has been followed in [facility].  However, he continues to have gained weight.  When I saw him last, his weight was 202 pounds, and his goal weight is somewhere in that region.  However, he has slowly been gaining weight, and his diuretic dosing has not been aggressively increased.  He did see the [facility] fairly recently,  and one dose of metolazone 2.5 mg was given, and he did lose 3 pounds, but no more doses were given, and his weight has steadily still been increasing.  He is short of breath and his belly feels extremely distended.      He otherwise denies chest pains.  He denies edema in the ankles.  He denies palpitations, syncope or near-syncope.  He denies bleeding      CURRENT MEDICATIONS:     1.  Lasix 80 mg twice daily.   2.  Apixaban.   3.  Imdur 60 mg a day.   4.  Gabapentin.   5.  Simvastatin 10 mg a day.   6.  Carvedilol 3.125 mg twice daily.      PHYSICAL EXAM:     VITAL SIGNS:  His blood pressure is 111/68 with a pulse of 74.  His weight is 213 pounds.   NECK:  Jugular venous pressure that is not readable.   LUNGS:  Coarse breath sounds bilaterally, wet.   CARDIAC:  Irregularly irregular rhythm.  No gross gallop appreciated.   BELLY:  Distended but nontender.   EXTREMITIES:  Without significant edema.      STUDIES:  Pharmacologic nuclear stress test, 03/25/2015, reveals no evidence for stress-induced ischemia.      Echocardiogram, 03/23/2015, reveals normal LV systolic function with mild MR.      09/19/2017:  Potassium 4.5, GFR 27.        IMPRESSION:      Ramos is an 86-year-old gentleman who has several active cardiac issues:     1.  [place] congestive heart failure.      The patient is clearly fluid overloaded.  He is at least 10 pounds up, and he has wet rales on examination today.  He experienced good diuresis with a single dose of metolazone, but the metolazone was not continued.  We will attempt to prevent hospitalization with oral therapy, at least for the moment.     2.  Coronary artery disease.      He had a stent placed in 2001 for left facial numbness radiating to his left neck and elbow.  He experienced good resolution of symptoms after revascularization.  He has not had any recurrent symptoms.     3.  Hypertension.  Blood pressures are well controlled.     4.  Permanent atrial fibrillation.      CHADS-VASc2  score of 6 for congestive heart failure, hypertension, age, diabetes, and prior neurologic event.  He does have significant renal insufficiency while on apixaban.  As he does have severe renal insufficiency, Coumadin would be the recommended agent.        PLAN:     1.  Due to severe renal insufficiency, and lack of renal metabolism with Coumadin as opposed to NOACs, he should switch from apixaban to Coumadin.  Would recommend continuing apixaban until the INR approaches 2, and then discontinuing apixaban.     2.  Add metolazone 2.5 mg starting tonight, 30 minutes prior to Lasix dose, and continue for 3 days.  Check Chem-7 on Monday with phone call with progress check.  Already has a follow-up appointment in [facility] next Thursday.     3.  He was instructed to seek immediate medical attention in the Emergency Room for hospital admission and IV diuresis if his clinical status should worsen, if he becomes more short of breath, or if his weight should continue to increase.     4.  Follow up with me in one month or earlier if needed.      Once again, it was a pleasure participating in the care of your patient, Mr. Hayder Oconnor.  Please feel free to contact me at any time if there are any questions regarding his care in the future.         Sincerely,      ALISIA COLBY MD             D: 10/03/2017 15:12   T: 10/04/2017 03:12   MT: MARGARET#[id number]      Name:     HAYDER OCONNOR   MRN:      [id number]        Account:      [id number]   :      1931      Document: [id number]       cc: Trinidad Valencia PA-C

## 2024-07-26 NOTE — NURSING NOTE
Patient is discharged as ordered by MD, PIV removed with canula intact. No s/s of phlebitis observed. Pt is going  to [facility] swing bed for rehabilitation, report called to Marissa KENNY at 1330, patient left the facility via ambulance accompanied by two attendants for [place], daughter is at the bedside, no acute distress noted no c/o voiced.

## 2024-07-27 LAB
BACTERIA SPEC AEROBE CULT: NORMAL
BACTERIA SPEC AEROBE CULT: NORMAL

## 2024-08-05 NOTE — PLAN OF CARE
[facility]  Discharge Summary  Hospital Medicine       Date of Admission:  8/4/2024  Date of Discharge:  8/4/2024  Discharging Provider: Nash Jara PA-C      Identification and Chief Compaint: Mora Reardon is a 37 year old female who presented on 8/4/2024 with complaint of neuropathy.    Discharge Diagnoses   Active Problems:    Hypokalemia    Paresthesias    Chronic hypokalemia    Gitelman syndrome    Bartter syndrome (H24)       Follow-ups Needed After Discharge   {Additional follow-up instructions/to-do's for PCP: potassium supplements, CMP, CBC    Unresulted Labs Ordered in the Past 30 Days of this Admission       No orders found from 7/5/2024 to 8/5/2024.            Hospital Course   Mora Reardon is a 37 year old female admitted on 8/4/2024. She presents with tingling sensation in bilateral fingers for the last 3 days.  Patient with a history of Bartter syndrome and is previously prescribed potassium supplementation daily. Patient was admitted at 1414 on 8/4 and left AMA at 2016 on 8/4. I did not get a chance to speak with her prior to her leaving AMA.      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  According to records patient has been poorly compliant with potassium segmentation in the past and has had multiple episodes of hypokalemia.  Denies any vomiting, diarrhea, or increased urinary frequency.  Patient's potassium 2.6 in the ED.  Bicarb 30, chloride 96.  Labs consistent with Bartter syndrome which presents with hypokalemic and metabolic alkalosis without hypertension.  Does not appear to follow with an outpatient nephrologist for ongoing management. Paraesthesias likely secondary electrolyte abnormalities and are likely transient when potassium approaches normal range.  Received 80 meq potassium  Goal Outcome Evaluation:  Plan of Care Reviewed With: patient        Progress: improving  Outcome Evaluation: Pt  supine in bed and  willing to participate withv treatment. Pt performed bed mobility with cga, STS from EOB with gait belt cga/min a and transferred bed to chair with cga/min a with use of gait belt. See flowsheet for details.  Cont PT per POC progressing to goals as pt tolerates.                                supplementation prior to recheck.  EKG shows sinus bradycardia without heart block.    -Initiated amiloride 5 mg daily, blocks epithelial sodium channels which subsequently helps patient retain more potassium. Patient without significant hypertension so preferred over spironolactone, which also has less downstream androgenic effects.      Bipolar disorder in full remission  Stable.  Noted in history.  No current medications.  -No interventions at this time     Tobacco use disorder  Smokes half pack a day 6-pack-year history.     Obesity, class I  BMI 30.5.   -Continue outpatient follow-ups with PCP for nutritional counseling and weight loss strategies     Chronic right-sided low back pain  Anterolisthesis  History of meralgia paresthetica  Stable.  Noted previously to have x-rays demonstrating spondylolytic anterolisthesis of L5.  Underwent directed physical therapy and conservative medical management.  Has used gabapentin as needed for back pain.  -Tylenol as needed for pain  -Completed course of outpatient physical therapy    Consultations This Hospital Stay   None    Code Status   Full Code      Time Spent on this Encounter   Total time on this discharge was 20 minutes.       Nash Jara PA-C  [facility]  ______________________________________________________________________    Physical Exam   Vital Signs: Temp: 97.8  F (36.6  C) Temp src: Oral BP: (!) 102/38 Pulse: 64   Resp: 14 SpO2: 97 % O2 Device: None (Room air)    Weight: 165 lbs 12.57 oz         Primary Care Physician   Norma Montes  [address] [place] [id number]     Discharge Disposition   Discharged to home  Left AMA  Condition at discharge: Fair    Significant Results and Procedures   Results for orders placed or performed in visit on 03/12/24   XR Chest 2 Views    Narrative    EXAM: XR CHEST 2 VIEWS  LOCATION: [facility]  DATE: 3/12/2024    INDICATION: worsening cough,  headache  COMPARISON: None.      Impression    IMPRESSION:   There is a probable patchy infiltrate in the retrocardiac left base posterior medially. On the lateral view this has a slightly nodular appearance. This is favored to represent an infectious infiltrate however given the nodular appearance a repeat PA and   lateral radiograph is recommended once the acute illness subsides to ensure resolution.    Chest otherwise negative.    __    NOTE: ABNORMAL REPORT    THE DICTATION ABOVE DESCRIBES AN ABNORMALITY FOR WHICH FOLLOW-UP IS NEEDED.        Procedures  None    Discharge Orders   No discharge procedures on file.  Discharge Medications   Current Discharge Medication List        CONTINUE these medications which have NOT CHANGED    Details   acetaminophen (TYLENOL) 325 MG tablet Take 325-650 mg by mouth every 6 hours as needed for mild pain      albuterol (PROAIR HFA/PROVENTIL HFA/VENTOLIN HFA) 108 (90 Base) MCG/ACT inhaler Inhale 2 puffs into the lungs every 4 hours as needed for shortness of breath, wheezing or cough  Qty: 18 g, Refills: 0    Comments: Pharmacy may dispense brand covered by insurance (Proair, or proventil or ventolin or generic albuterol inhaler)  Associated Diagnoses: Bronchitis      gabapentin (NEURONTIN) 300 MG capsule Take 1 capsule (300 mg) by mouth 3 times daily  Qty: 60 capsule, Refills: 1    Associated Diagnoses: Acute midline low back pain without sciatica; Spondylolisthesis, grade 2; Lumbosacral radiculopathy      potassium chloride ER (KLOR-CON M) 20 MEQ CR tablet Take 2 tablets (40 mEq) by mouth daily  Qty: 60 tablet, Refills: 5    Associated Diagnoses: Bartter's syndrome (H24)           Allergies   Allergies   Allergen Reactions    Keflex [Cephalexin Monohydrate] Hives and Rash

## 2024-08-13 ENCOUNTER — READMISSION MANAGEMENT (OUTPATIENT)
Dept: CALL CENTER | Facility: HOSPITAL | Age: 79
End: 2024-08-13
Payer: MEDICARE

## 2024-08-13 ENCOUNTER — TELEPHONE (OUTPATIENT)
Dept: INTERNAL MEDICINE | Facility: CLINIC | Age: 79
End: 2024-08-13

## 2024-08-13 DIAGNOSIS — I48.0 PAROXYSMAL ATRIAL FIBRILLATION: ICD-10-CM

## 2024-08-13 NOTE — TELEPHONE ENCOUNTER
Caller: [facility] FRANK    Best call back number: [phone]    New or established patient?  [] New  [x] Established    Date of discharge:8-13-24    Facility discharged from:  [facility]    Diagnosis/Symptoms: DECONDITIONING     Length of stay (If applicable): 7-26 TO 8-13

## 2024-08-13 NOTE — OUTREACH NOTE
Prep Survey      Flowsheet Row Responses   [facility] facility patient discharged from? Non-[facility]   Is LACE score < 7 ? Non- Discharge   Eligibility [facility]   Date of Admission 07/26/24   Date of Discharge 08/13/24   Discharge diagnosis DECONDITIONING   Does the patient have one of the following disease processes/diagnoses(primary or secondary)? Other   Prep survey completed? Yes            Brianna HOLBROOK - Registered Nurse

## 2024-08-14 ENCOUNTER — TRANSITIONAL CARE MANAGEMENT TELEPHONE ENCOUNTER (OUTPATIENT)
Dept: CALL CENTER | Facility: HOSPITAL | Age: 79
End: 2024-08-14
Payer: MEDICARE

## 2024-08-14 ENCOUNTER — TELEPHONE (OUTPATIENT)
Dept: INTERNAL MEDICINE | Facility: CLINIC | Age: 79
End: 2024-08-14
Payer: MEDICARE

## 2024-08-14 NOTE — TELEPHONE ENCOUNTER
Patient's daughter called the office needing to reschedule the hospital follow up that her mother has. They need a later time but Dr. aSunders only has 11:30 open. If MA could please call to work her in at a different time or give permission to see a different PCP. Patient is also wondering if PCP will call in more pain med that D/C provider prescribed until her appointment with pain management on 8/20.

## 2024-08-14 NOTE — OUTREACH NOTE
Call Center TCM Note      Flowsheet Row Responses   [facility] patient discharged from? Non-  [[facility]]   Does the patient have one of the following disease processes/diagnoses(primary or secondary)? Other   TCM attempt successful? No   Unsuccessful attempts Attempt 2  [Attempted to reach patient and daughter Malu, listed on PCP verbal release. No answer.]            Socorro Ramirez RN    8/14/2024, 17:35 EDT

## 2024-08-14 NOTE — OUTREACH NOTE
Call Center TCM Note      Flowsheet Row Responses   [facility] patient discharged from? Non-   Does the patient have one of the following disease processes/diagnoses(primary or secondary)? Other   TCM attempt successful? No   Unsuccessful attempts Attempt 1   Call Status Left message            Yessy Cuevas RN    8/14/2024, 11:39 EDT

## 2024-08-15 ENCOUNTER — TRANSITIONAL CARE MANAGEMENT TELEPHONE ENCOUNTER (OUTPATIENT)
Dept: CALL CENTER | Facility: HOSPITAL | Age: 79
End: 2024-08-15
Payer: MEDICARE

## 2024-08-19 ENCOUNTER — TELEPHONE (OUTPATIENT)
Dept: INTERNAL MEDICINE | Facility: CLINIC | Age: 79
End: 2024-08-19

## 2024-08-19 DIAGNOSIS — J44.9 CHRONIC OBSTRUCTIVE PULMONARY DISEASE, UNSPECIFIED COPD TYPE: ICD-10-CM

## 2024-08-19 RX ORDER — BUDESONIDE, GLYCOPYRROLATE, AND FORMOTEROL FUMARATE 160; 9; 4.8 UG/1; UG/1; UG/1
2 AEROSOL, METERED RESPIRATORY (INHALATION) 2 TIMES DAILY
Qty: 3 EACH | Refills: 3 | Status: SHIPPED | OUTPATIENT
Start: 2024-08-19

## 2024-08-19 NOTE — TELEPHONE ENCOUNTER
[facility] team member stopped by the office today asking if Dr. Saunders will still be seeing/taking care of Patient while she is being cared for by the home health team. They are requesting a call back as soon as possible.     Office:[phone]  Cell:[phone]

## 2024-08-20 ENCOUNTER — TELEMEDICINE (OUTPATIENT)
Dept: INTERNAL MEDICINE | Facility: CLINIC | Age: 79
End: 2024-08-20
Payer: MEDICARE

## 2024-08-20 ENCOUNTER — TELEPHONE (OUTPATIENT)
Dept: CARDIOLOGY | Facility: CLINIC | Age: 79
End: 2024-08-20
Payer: MEDICARE

## 2024-08-20 VITALS — BODY MASS INDEX: 24.95 KG/M2 | HEIGHT: 65 IN

## 2024-08-20 DIAGNOSIS — Z09 HOSPITAL DISCHARGE FOLLOW-UP: ICD-10-CM

## 2024-08-20 DIAGNOSIS — F41.9 ANXIETY: ICD-10-CM

## 2024-08-20 DIAGNOSIS — I10 ESSENTIAL HYPERTENSION: Primary | ICD-10-CM

## 2024-08-20 DIAGNOSIS — M17.0 PRIMARY OSTEOARTHRITIS OF BOTH KNEES: ICD-10-CM

## 2024-08-20 PROCEDURE — 1125F AMNT PAIN NOTED PAIN PRSNT: CPT | Performed by: STUDENT IN AN ORGANIZED HEALTH CARE EDUCATION/TRAINING PROGRAM

## 2024-08-20 PROCEDURE — 1159F MED LIST DOCD IN RCRD: CPT | Performed by: STUDENT IN AN ORGANIZED HEALTH CARE EDUCATION/TRAINING PROGRAM

## 2024-08-20 PROCEDURE — 99214 OFFICE O/P EST MOD 30 MIN: CPT | Performed by: STUDENT IN AN ORGANIZED HEALTH CARE EDUCATION/TRAINING PROGRAM

## 2024-08-20 PROCEDURE — G2211 COMPLEX E/M VISIT ADD ON: HCPCS | Performed by: STUDENT IN AN ORGANIZED HEALTH CARE EDUCATION/TRAINING PROGRAM

## 2024-08-20 PROCEDURE — 1160F RVW MEDS BY RX/DR IN RCRD: CPT | Performed by: STUDENT IN AN ORGANIZED HEALTH CARE EDUCATION/TRAINING PROGRAM

## 2024-08-20 RX ORDER — VALSARTAN 40 MG/1
40 TABLET ORAL DAILY
Qty: 90 TABLET | Refills: 1 | Status: SHIPPED | OUTPATIENT
Start: 2024-08-20

## 2024-08-20 RX ORDER — CLONAZEPAM 1 MG/1
1 TABLET ORAL 2 TIMES DAILY PRN
Qty: 180 TABLET | Refills: 0 | Status: SHIPPED | OUTPATIENT
Start: 2024-08-20

## 2024-08-20 RX ORDER — METOPROLOL SUCCINATE 25 MG/1
25 TABLET, EXTENDED RELEASE ORAL
Qty: 30 TABLET | Refills: 0 | Status: SHIPPED | OUTPATIENT
Start: 2024-08-20 | End: 2024-09-19

## 2024-08-20 RX ORDER — METHOCARBAMOL 750 MG/1
750 TABLET, FILM COATED ORAL 4 TIMES DAILY
Qty: 360 TABLET | Refills: 0 | Status: SHIPPED | OUTPATIENT
Start: 2024-08-20 | End: 2024-11-18

## 2024-08-20 RX ORDER — DULOXETIN HYDROCHLORIDE 60 MG/1
60 CAPSULE, DELAYED RELEASE ORAL 2 TIMES DAILY
Qty: 60 CAPSULE | Refills: 1 | Status: SHIPPED | OUTPATIENT
Start: 2024-08-20

## 2024-08-20 RX ORDER — CLONAZEPAM 1 MG/1
1 TABLET ORAL 2 TIMES DAILY PRN
Qty: 180 TABLET | Refills: 0 | Status: SHIPPED | OUTPATIENT
Start: 2024-08-20 | End: 2024-08-20

## 2024-08-20 NOTE — TELEPHONE ENCOUNTER
Name: Malu Aguilar    Relationship: Emergency Contact    Best Callback Number: [phone]    Incoming call to the Hub, requesting to  Reschedule their HFU appointment on 8.21.2024.     Per [facility] workflow, further review is needed before the task can be completed.    Result of Call: Transferred to [place] at the [facility]

## 2024-08-20 NOTE — PROGRESS NOTES
"Chief Complaint  Hospital Follow Up Visit (Bowel blockage )    Subjective           Gabi Dudley presents to [facility]  History of Present Illness  Pt presents for hospital follow up.Pt had l3 fracture, diverticulitis, shingles, type 2 mi, uti.   She has been continuing to have a lot of pain in back. Saw pain management this afternoon and was given percocet refills.   Anxiety - has been some better since increasing cymbalta at hospital and being on clonazepam. Denies panic attack si  Htn - stopped cardize inpatient and was started on metoprolol and valsartan. Has been doing well on them with no issues. Denies chest pain, soa, diaphoresis. Denies palpitations.     Objective   Vital Signs:   Ht 165.1 cm (65\")   BMI 24.95 kg/m²     Estimated body mass index is 24.95 kg/m² as calculated from the following:    Height as of this encounter: 165.1 cm (65\").    Weight as of 7/23/24: 68 kg (149 lb 14.6 oz).     Physical Exam   Constitutional: She appears well-developed and well-nourished.   HENT:   Head: Normocephalic and atraumatic.   Eyes: Conjunctivae are normal.   Neck: Neck normal appearance.  Pulmonary/Chest: Effort normal.   Neurological: She is alert.   Psychiatric: She has a normal mood and affect.     Result Review :                   Assessment and Plan      Diagnoses and all orders for this visit:    1. Essential hypertension (Primary)  -     valsartan (Diovan) 40 MG tablet; Take 1 tablet by mouth Daily.  Dispense: 90 tablet; Refill: 1  -     metoprolol succinate XL (TOPROL-XL) 25 MG 24 hr tablet; Take 1 tablet by mouth Daily for 30 days.  Dispense: 30 tablet; Refill: 0    2. Primary osteoarthritis of both knees  -     methocarbamol (ROBAXIN) 750 MG tablet; Take 1 tablet by mouth 4 (Four) Times a Day for 90 days.  Dispense: 360 tablet; Refill: 0    3. Anxiety  -     DULoxetine (CYMBALTA) 60 MG capsule; Take 1 capsule by mouth 2 (Two) Times a Day.  Dispense: 60 capsule; Refill: " 1  -     Discontinue: clonazePAM (KlonoPIN) 1 MG tablet; Take 1 tablet by mouth 2 (Two) Times a Day As Needed for Anxiety.  Dispense: 180 tablet; Refill: 0  -     clonazePAM (KlonoPIN) 1 MG tablet; Take 1 tablet by mouth 2 (Two) Times a Day As Needed for Anxiety.  Dispense: 180 tablet; Refill: 0    4. Hospital discharge follow-up        Follow Up     No follow-ups on file.  Patient was given instructions and counseling regarding her condition or for health maintenance advice. Please see specific information pulled into the AVS if appropriate.     Mode of Visit: Video  Location of patient: home  Location of provider: home  You have chosen to receive care through a telehealth visit.  Does the patient consent to use a video/audio connection for your medical care today? Yes  The visit included audio and video interaction. No technical issues occurred during this visit.

## 2024-09-03 ENCOUNTER — OUTSIDE FACILITY SERVICE (OUTPATIENT)
Dept: INTERNAL MEDICINE | Facility: CLINIC | Age: 79
End: 2024-09-03
Payer: MEDICARE

## 2024-09-09 RX ORDER — SERTRALINE HYDROCHLORIDE 100 MG/1
TABLET, FILM COATED ORAL
Qty: 45 TABLET | Refills: 0 | Status: SHIPPED | OUTPATIENT
Start: 2024-09-09

## 2024-09-24 ENCOUNTER — OFFICE VISIT (OUTPATIENT)
Dept: CARDIOLOGY | Facility: CLINIC | Age: 79
End: 2024-09-24
Payer: MEDICARE

## 2024-09-24 VITALS
HEART RATE: 69 BPM | RESPIRATION RATE: 15 BRPM | BODY MASS INDEX: 160.94 KG/M2 | OXYGEN SATURATION: 98 % | DIASTOLIC BLOOD PRESSURE: 68 MMHG | SYSTOLIC BLOOD PRESSURE: 110 MMHG | HEIGHT: 55 IN

## 2024-09-24 DIAGNOSIS — I48.0 PAROXYSMAL ATRIAL FIBRILLATION: ICD-10-CM

## 2024-09-24 DIAGNOSIS — E78.5 HYPERLIPIDEMIA, UNSPECIFIED HYPERLIPIDEMIA TYPE: ICD-10-CM

## 2024-09-24 DIAGNOSIS — I50.42 CHRONIC COMBINED SYSTOLIC AND DIASTOLIC CHF (CONGESTIVE HEART FAILURE): ICD-10-CM

## 2024-09-24 DIAGNOSIS — I10 ESSENTIAL HYPERTENSION: ICD-10-CM

## 2024-09-24 DIAGNOSIS — I25.10 CORONARY ARTERY DISEASE INVOLVING NATIVE CORONARY ARTERY OF NATIVE HEART WITHOUT ANGINA PECTORIS: ICD-10-CM

## 2024-09-24 DIAGNOSIS — I21.4 NSTEMI (NON-ST ELEVATED MYOCARDIAL INFARCTION): Primary | ICD-10-CM

## 2024-09-27 DIAGNOSIS — J44.9 CHRONIC OBSTRUCTIVE PULMONARY DISEASE, UNSPECIFIED COPD TYPE: ICD-10-CM

## 2024-09-27 RX ORDER — ALBUTEROL SULFATE 90 UG/1
2 INHALANT RESPIRATORY (INHALATION) EVERY 4 HOURS PRN
Qty: 18 G | Refills: 0 | Status: SHIPPED | OUTPATIENT
Start: 2024-09-27

## 2024-10-01 RX ORDER — FLUTICASONE PROPIONATE 50 UG/1
2 SPRAY, METERED NASAL DAILY
Qty: 15.8 ML | Refills: 0 | Status: SHIPPED | OUTPATIENT
Start: 2024-10-01

## 2024-10-02 ENCOUNTER — TELEPHONE (OUTPATIENT)
Dept: INTERNAL MEDICINE | Facility: CLINIC | Age: 79
End: 2024-10-02
Payer: MEDICARE

## 2024-10-02 NOTE — TELEPHONE ENCOUNTER
Caller: Malu Aguilar    Relationship: Emergency Contact    Best call back number: [phone]     What medication are you requesting: MYA PEARLS FOR COUGH, ATORVASTATIN    What are your current symptoms:     How long have you been experiencing symptoms:     Have you had these symptoms before:    [] Yes  [] No    Have you been treated for these symptoms before:   [] Yes  [] No    If a prescription is needed, what is your preferred pharmacy and phone number: [id number] - [place], [place] - [facility] [address]  - [phone] [facility] [phone]      Additional notes:

## 2024-10-03 RX ORDER — BENZONATATE 100 MG/1
100 CAPSULE ORAL 3 TIMES DAILY PRN
Qty: 30 CAPSULE | Refills: 0 | Status: SHIPPED | OUTPATIENT
Start: 2024-10-03

## 2024-10-03 RX ORDER — ATORVASTATIN CALCIUM 40 MG/1
40 TABLET, FILM COATED ORAL DAILY
Qty: 90 TABLET | Refills: 2 | Status: SHIPPED | OUTPATIENT
Start: 2024-10-03

## 2024-10-03 NOTE — TELEPHONE ENCOUNTER
Atorvastatin 40 mg was last prescribed by George Durant but only for 30 days. Did you want patient to continue? Both prescriptions pended to you for approval if appropriate.

## 2024-10-07 RX ORDER — SERTRALINE HYDROCHLORIDE 100 MG/1
TABLET, FILM COATED ORAL
Qty: 135 TABLET | Refills: 1 | Status: SHIPPED | OUTPATIENT
Start: 2024-10-07

## 2024-10-15 ENCOUNTER — PATIENT MESSAGE (OUTPATIENT)
Dept: INTERNAL MEDICINE | Facility: CLINIC | Age: 79
End: 2024-10-15
Payer: MEDICARE

## 2024-10-15 RX ORDER — BENZONATATE 100 MG/1
100 CAPSULE ORAL 3 TIMES DAILY PRN
Qty: 30 CAPSULE | Refills: 0 | Status: SHIPPED | OUTPATIENT
Start: 2024-10-15

## 2024-10-15 RX ORDER — ATORVASTATIN CALCIUM 40 MG/1
40 TABLET, FILM COATED ORAL DAILY
Qty: 90 TABLET | Refills: 2 | Status: SHIPPED | OUTPATIENT
Start: 2024-10-15

## 2024-10-15 RX ORDER — PANTOPRAZOLE SODIUM 40 MG/1
40 TABLET, DELAYED RELEASE ORAL DAILY
Qty: 90 TABLET | Refills: 3 | Status: SHIPPED | OUTPATIENT
Start: 2024-10-15

## 2024-10-15 RX ORDER — QUETIAPINE FUMARATE 25 MG/1
12.5 TABLET, FILM COATED ORAL NIGHTLY
Qty: 45 TABLET | Refills: 3 | Status: SHIPPED | OUTPATIENT
Start: 2024-10-15

## 2024-10-15 NOTE — TELEPHONE ENCOUNTER
Caller: Debra Aguilarly    Relationship: Emergency Contact    Best call back number: [phone] Requested Prescriptions:   Requested Prescriptions     Pending Prescriptions Disp Refills    benzonatate (TESSALON) 100 MG capsule 30 capsule 0     Sig: Take 1 capsule by mouth 3 (Three) Times a Day As Needed for Cough.        Pharmacy where request should be sent: [id number] [place], [place] - [address] [facility] - [phone]  - [phone]      Last office visit with prescribing clinician: 1/9/2024   Last telemedicine visit with prescribing clinician: 8/20/2024   Next office visit with prescribing clinician: Visit date not found     Additional details provided by patient:     Does the patient have less than a 3 day supply:  [] Yes  [x] No    Would you like a call back once the refill request has been completed: [] Yes [x] No    If the office needs to give you a call back, can they leave a voicemail: [] Yes [] No    Rafa Bhatti Rep   10/15/24 13:23 EDT

## 2024-10-21 DIAGNOSIS — J44.9 CHRONIC OBSTRUCTIVE PULMONARY DISEASE, UNSPECIFIED COPD TYPE: ICD-10-CM

## 2024-10-21 RX ORDER — ALBUTEROL SULFATE 0.83 MG/ML
2.5 SOLUTION RESPIRATORY (INHALATION) EVERY 4 HOURS PRN
Qty: 3 ML | Refills: 12 | Status: SHIPPED | OUTPATIENT
Start: 2024-10-21

## 2024-10-21 RX ORDER — ALBUTEROL SULFATE 90 UG/1
2 INHALANT RESPIRATORY (INHALATION) EVERY 4 HOURS PRN
Qty: 18 G | Refills: 1 | Status: SHIPPED | OUTPATIENT
Start: 2024-10-21

## 2024-10-21 RX ORDER — FLUTICASONE PROPIONATE 50 UG/1
2 SPRAY, METERED NASAL DAILY
Qty: 16 G | Refills: 0 | Status: SHIPPED | OUTPATIENT
Start: 2024-10-21

## 2024-10-21 NOTE — TELEPHONE ENCOUNTER
Caller: Malu Aguilar    Relationship: Emergency Contact    Best call back number: [phone]     Requested Prescriptions:   Requested Prescriptions     Pending Prescriptions Disp Refills    albuterol sulfate  (90 Base) MCG/ACT inhaler 18 g 0     Sig: Inhale 2 puffs Every 4 (Four) Hours As Needed for Wheezing.    fluticasone (FLONASE) 50 MCG/ACT nasal spray 15.8 mL 0     Sig: Administer 2 sprays into the nostril(s) as directed by provider Daily.   ALBUTEROL NEBULIZER SOLUTION     Pharmacy where request should be sent: [facility] [id number] - [address] - [phone]  - [phone] FX     Last office visit with prescribing clinician: 1/9/2024   Last telemedicine visit with prescribing clinician: 8/20/2024   Next office visit with prescribing clinician: Visit date not found     Additional details provided by patient:     Does the patient have less than a 3 day supply:  [x] Yes  [] No    Would you like a call back once the refill request has been completed: [] Yes [x] No    If the office needs to give you a call back, can they leave a voicemail: [] Yes [x] No    Rafa Retana Rep   10/21/24 08:57 EDT

## 2024-10-21 NOTE — TELEPHONE ENCOUNTER
Rx Refill Note  Requested Prescriptions     Pending Prescriptions Disp Refills    albuterol sulfate  (90 Base) MCG/ACT inhaler 18 g 0     Sig: Inhale 2 puffs Every 4 (Four) Hours As Needed for Wheezing.    fluticasone (FLONASE) 50 MCG/ACT nasal spray 15.8 mL 0     Sig: Administer 2 sprays into the nostril(s) as directed by provider Daily.      Last office visit with prescribing clinician: 1/9/2024   Last telemedicine visit with prescribing clinician: 8/20/2024   Next office visit with prescribing clinician: Visit date not found                         Would you like a call back once the refill request has been completed: [] Yes [] No    If the office needs to give you a call back, can they leave a voicemail: [] Yes [] No    Adrianna Myers MA  10/21/24, 11:10 EDT

## 2024-10-24 ENCOUNTER — TELEPHONE (OUTPATIENT)
Dept: CARDIOLOGY | Facility: CLINIC | Age: 79
End: 2024-10-24

## 2024-10-24 NOTE — TELEPHONE ENCOUNTER
Caller Name: Malu Aguilar      Relationship: Emergency Contact      Best Contact Number: [phone]      Patient is requesting samples of ELIQUIS      How many days of medication do you have left? NONE

## 2024-10-25 DIAGNOSIS — I48.0 PAROXYSMAL ATRIAL FIBRILLATION: ICD-10-CM

## 2024-10-28 DIAGNOSIS — F41.9 ANXIETY: ICD-10-CM

## 2024-10-28 RX ORDER — DULOXETIN HYDROCHLORIDE 60 MG/1
60 CAPSULE, DELAYED RELEASE ORAL 2 TIMES DAILY
Qty: 60 CAPSULE | Refills: 1 | Status: CANCELLED | OUTPATIENT
Start: 2024-10-28

## 2024-10-28 RX ORDER — DULOXETIN HYDROCHLORIDE 60 MG/1
60 CAPSULE, DELAYED RELEASE ORAL 2 TIMES DAILY
Qty: 180 CAPSULE | Refills: 1 | Status: SHIPPED | OUTPATIENT
Start: 2024-10-28

## 2024-12-16 DIAGNOSIS — I10 ESSENTIAL HYPERTENSION: ICD-10-CM

## 2024-12-16 DIAGNOSIS — J44.9 CHRONIC OBSTRUCTIVE PULMONARY DISEASE, UNSPECIFIED COPD TYPE: ICD-10-CM

## 2024-12-16 RX ORDER — METOPROLOL SUCCINATE 25 MG/1
25 TABLET, EXTENDED RELEASE ORAL
Qty: 90 TABLET | Refills: 1 | Status: SHIPPED | OUTPATIENT
Start: 2024-12-16

## 2024-12-16 RX ORDER — ALBUTEROL SULFATE 90 UG/1
2 INHALANT RESPIRATORY (INHALATION) EVERY 4 HOURS PRN
Qty: 18 G | Refills: 3 | Status: SHIPPED | OUTPATIENT
Start: 2024-12-16

## 2024-12-16 NOTE — TELEPHONE ENCOUNTER
Rx Refill Note  Requested Prescriptions     Pending Prescriptions Disp Refills    metoprolol succinate XL (TOPROL-XL) 25 MG 24 hr tablet 30 tablet 0     Sig: Take 1 tablet by mouth Daily for 30 days.    albuterol sulfate  (90 Base) MCG/ACT inhaler 18 g 1     Sig: Inhale 2 puffs Every 4 (Four) Hours As Needed for Wheezing.      Last office visit with prescribing clinician: 1/9/2024   Last telemedicine visit with prescribing clinician: 8/20/2024   Next office visit with prescribing clinician: Visit date not found       Adrianna Myers MA  12/16/24, 15:16 EST

## 2024-12-20 ENCOUNTER — APPOINTMENT (OUTPATIENT)
Dept: GENERAL RADIOLOGY | Facility: HOSPITAL | Age: 79
End: 2024-12-20
Payer: MEDICARE

## 2024-12-20 ENCOUNTER — HOSPITAL ENCOUNTER (INPATIENT)
Facility: HOSPITAL | Age: 79
LOS: 3 days | Discharge: HOME HEALTH | End: 2024-12-23
Attending: STUDENT IN AN ORGANIZED HEALTH CARE EDUCATION/TRAINING PROGRAM | Admitting: INTERNAL MEDICINE
Payer: MEDICARE

## 2024-12-20 DIAGNOSIS — I10 HYPERTENSION, UNSPECIFIED TYPE: ICD-10-CM

## 2024-12-20 DIAGNOSIS — J96.01 ACUTE RESPIRATORY FAILURE WITH HYPOXIA AND HYPERCAPNIA: ICD-10-CM

## 2024-12-20 DIAGNOSIS — J44.1 COPD EXACERBATION: ICD-10-CM

## 2024-12-20 DIAGNOSIS — J96.02 ACUTE RESPIRATORY FAILURE WITH HYPOXIA AND HYPERCAPNIA: ICD-10-CM

## 2024-12-20 DIAGNOSIS — E87.29 RESPIRATORY ACIDOSIS: ICD-10-CM

## 2024-12-20 DIAGNOSIS — J44.1 COPD WITH ACUTE EXACERBATION: Primary | ICD-10-CM

## 2024-12-20 LAB
ALBUMIN SERPL-MCNC: 3.7 G/DL (ref 3.5–5.2)
ALBUMIN/GLOB SERPL: 1.2 G/DL
ALP SERPL-CCNC: 165 U/L (ref 39–117)
ALT SERPL W P-5'-P-CCNC: 49 U/L (ref 1–33)
ANION GAP SERPL CALCULATED.3IONS-SCNC: 7.3 MMOL/L (ref 5–15)
AST SERPL-CCNC: 56 U/L (ref 1–32)
ATMOSPHERIC PRESS: 733 MMHG
BASE EXCESS BLDV CALC-SCNC: 14.8 MMOL/L (ref 0–2)
BASOPHILS # BLD AUTO: 0.03 10*3/MM3 (ref 0–0.2)
BASOPHILS NFR BLD AUTO: 0.6 % (ref 0–1.5)
BDY SITE: ABNORMAL
BILIRUB SERPL-MCNC: <0.2 MG/DL (ref 0–1.2)
BUN SERPL-MCNC: 18 MG/DL (ref 8–23)
BUN/CREAT SERPL: 32.7 (ref 7–25)
CALCIUM SPEC-SCNC: 9.2 MG/DL (ref 8.6–10.5)
CHLORIDE SERPL-SCNC: 96 MMOL/L (ref 98–107)
CO2 SERPL-SCNC: 33.7 MMOL/L (ref 22–29)
COHGB MFR BLD: 1 % (ref 0–5)
CREAT SERPL-MCNC: 0.55 MG/DL (ref 0.57–1)
DEPRECATED RDW RBC AUTO: 47.6 FL (ref 37–54)
EGFRCR SERPLBLD CKD-EPI 2021: 94.5 ML/MIN/1.73
EOSINOPHIL # BLD AUTO: 0.05 10*3/MM3 (ref 0–0.4)
EOSINOPHIL NFR BLD AUTO: 1 % (ref 0.3–6.2)
ERYTHROCYTE [DISTWIDTH] IN BLOOD BY AUTOMATED COUNT: 14.7 % (ref 12.3–15.4)
FLUAV RNA RESP QL NAA+PROBE: NOT DETECTED
FLUBV RNA RESP QL NAA+PROBE: NOT DETECTED
GEN 5 1HR TROPONIN T REFLEX: 12 NG/L
GLOBULIN UR ELPH-MCNC: 3.1 GM/DL
GLUCOSE SERPL-MCNC: 117 MG/DL (ref 65–99)
HCO3 BLDV-SCNC: 44 MMOL/L (ref 22–28)
HCT VFR BLD AUTO: 33.5 % (ref 34–46.6)
HGB BLD-MCNC: 9.7 G/DL (ref 12–15.9)
HYPOCHROMIA BLD QL: NORMAL
IMM GRANULOCYTES # BLD AUTO: 0.02 10*3/MM3 (ref 0–0.05)
IMM GRANULOCYTES NFR BLD AUTO: 0.4 % (ref 0–0.5)
INHALED O2 CONCENTRATION: <21 %
LYMPHOCYTES # BLD AUTO: 1 10*3/MM3 (ref 0.7–3.1)
LYMPHOCYTES NFR BLD AUTO: 19.3 % (ref 19.6–45.3)
Lab: ABNORMAL
Lab: ABNORMAL
MCH RBC QN AUTO: 25.5 PG (ref 26.6–33)
MCHC RBC AUTO-ENTMCNC: 29 G/DL (ref 31.5–35.7)
MCV RBC AUTO: 88.2 FL (ref 79–97)
METHGB BLD QL: 0.9 % (ref 0–3)
MODALITY: ABNORMAL
MONOCYTES # BLD AUTO: 0.38 10*3/MM3 (ref 0.1–0.9)
MONOCYTES NFR BLD AUTO: 7.3 % (ref 5–12)
NEUTROPHILS NFR BLD AUTO: 3.7 10*3/MM3 (ref 1.7–7)
NEUTROPHILS NFR BLD AUTO: 71.4 % (ref 42.7–76)
NOTIFIED BY: ABNORMAL
NOTIFIED WHO: ABNORMAL
NRBC BLD AUTO-RTO: 0 /100 WBC (ref 0–0.2)
NT-PROBNP SERPL-MCNC: 1444 PG/ML (ref 0–1800)
OXYHGB MFR BLDV: 20.5 % (ref 40–70)
PCO2 BLDV: 86.6 MM HG (ref 40–50)
PH BLDV: 7.31 PH UNITS (ref 7.32–7.42)
PLATELET # BLD AUTO: 172 10*3/MM3 (ref 140–450)
PMV BLD AUTO: 10.9 FL (ref 6–12)
PO2 BLDV: 16.8 MM HG (ref 30–50)
POTASSIUM SERPL-SCNC: 4.7 MMOL/L (ref 3.5–5.2)
PROT SERPL-MCNC: 6.8 G/DL (ref 6–8.5)
RBC # BLD AUTO: 3.8 10*6/MM3 (ref 3.77–5.28)
SAO2 % BLDCOV: 20.9 % (ref 45–75)
SARS-COV-2 RNA RESP QL NAA+PROBE: NOT DETECTED
SMALL PLATELETS BLD QL SMEAR: ADEQUATE
SODIUM SERPL-SCNC: 137 MMOL/L (ref 136–145)
TROPONIN T % DELTA: -20 %
TROPONIN T NUMERIC DELTA: -3 NG/L
TROPONIN T SERPL HS-MCNC: 15 NG/L
VENTILATOR MODE: ABNORMAL
WBC MORPH BLD: NORMAL
WBC NRBC COR # BLD AUTO: 5.18 10*3/MM3 (ref 3.4–10.8)

## 2024-12-20 PROCEDURE — 84484 ASSAY OF TROPONIN QUANT: CPT | Performed by: STUDENT IN AN ORGANIZED HEALTH CARE EDUCATION/TRAINING PROGRAM

## 2024-12-20 PROCEDURE — 94660 CPAP INITIATION&MGMT: CPT

## 2024-12-20 PROCEDURE — 94799 UNLISTED PULMONARY SVC/PX: CPT

## 2024-12-20 PROCEDURE — 36415 COLL VENOUS BLD VENIPUNCTURE: CPT

## 2024-12-20 PROCEDURE — 94762 N-INVAS EAR/PLS OXIMTRY CONT: CPT

## 2024-12-20 PROCEDURE — 87636 SARSCOV2 & INF A&B AMP PRB: CPT | Performed by: STUDENT IN AN ORGANIZED HEALTH CARE EDUCATION/TRAINING PROGRAM

## 2024-12-20 PROCEDURE — 25010000002 DEXAMETHASONE SODIUM PHOSPHATE 10 MG/ML SOLUTION: Performed by: STUDENT IN AN ORGANIZED HEALTH CARE EDUCATION/TRAINING PROGRAM

## 2024-12-20 PROCEDURE — 85007 BL SMEAR W/DIFF WBC COUNT: CPT | Performed by: STUDENT IN AN ORGANIZED HEALTH CARE EDUCATION/TRAINING PROGRAM

## 2024-12-20 PROCEDURE — 94760 N-INVAS EAR/PLS OXIMETRY 1: CPT

## 2024-12-20 PROCEDURE — 94640 AIRWAY INHALATION TREATMENT: CPT

## 2024-12-20 PROCEDURE — 25010000002 METHYLPREDNISOLONE PER 40 MG: Performed by: INTERNAL MEDICINE

## 2024-12-20 PROCEDURE — 25010000002 ONDANSETRON PER 1 MG: Performed by: INTERNAL MEDICINE

## 2024-12-20 PROCEDURE — 80053 COMPREHEN METABOLIC PANEL: CPT | Performed by: STUDENT IN AN ORGANIZED HEALTH CARE EDUCATION/TRAINING PROGRAM

## 2024-12-20 PROCEDURE — 99223 1ST HOSP IP/OBS HIGH 75: CPT | Performed by: INTERNAL MEDICINE

## 2024-12-20 PROCEDURE — 83880 ASSAY OF NATRIURETIC PEPTIDE: CPT | Performed by: STUDENT IN AN ORGANIZED HEALTH CARE EDUCATION/TRAINING PROGRAM

## 2024-12-20 PROCEDURE — 71045 X-RAY EXAM CHEST 1 VIEW: CPT

## 2024-12-20 PROCEDURE — 82805 BLOOD GASES W/O2 SATURATION: CPT

## 2024-12-20 PROCEDURE — 25010000002 HYDRALAZINE PER 20 MG: Performed by: INTERNAL MEDICINE

## 2024-12-20 PROCEDURE — 93005 ELECTROCARDIOGRAM TRACING: CPT | Performed by: STUDENT IN AN ORGANIZED HEALTH CARE EDUCATION/TRAINING PROGRAM

## 2024-12-20 PROCEDURE — 99291 CRITICAL CARE FIRST HOUR: CPT | Performed by: STUDENT IN AN ORGANIZED HEALTH CARE EDUCATION/TRAINING PROGRAM

## 2024-12-20 PROCEDURE — 82820 HEMOGLOBIN-OXYGEN AFFINITY: CPT

## 2024-12-20 PROCEDURE — 85025 COMPLETE CBC W/AUTO DIFF WBC: CPT | Performed by: STUDENT IN AN ORGANIZED HEALTH CARE EDUCATION/TRAINING PROGRAM

## 2024-12-20 PROCEDURE — 94761 N-INVAS EAR/PLS OXIMETRY MLT: CPT

## 2024-12-20 RX ORDER — ACETAMINOPHEN 325 MG/1
650 TABLET ORAL EVERY 4 HOURS PRN
Status: DISCONTINUED | OUTPATIENT
Start: 2024-12-20 | End: 2024-12-23 | Stop reason: HOSPADM

## 2024-12-20 RX ORDER — ACETAMINOPHEN 160 MG/5ML
650 SOLUTION ORAL EVERY 4 HOURS PRN
Status: DISCONTINUED | OUTPATIENT
Start: 2024-12-20 | End: 2024-12-23 | Stop reason: HOSPADM

## 2024-12-20 RX ORDER — HYDRALAZINE HYDROCHLORIDE 20 MG/ML
10 INJECTION INTRAMUSCULAR; INTRAVENOUS EVERY 4 HOURS PRN
Status: DISCONTINUED | OUTPATIENT
Start: 2024-12-20 | End: 2024-12-23 | Stop reason: HOSPADM

## 2024-12-20 RX ORDER — DIAZEPAM 2 MG/1
2 TABLET ORAL ONCE
Status: COMPLETED | OUTPATIENT
Start: 2024-12-20 | End: 2024-12-20

## 2024-12-20 RX ORDER — SODIUM CHLORIDE 0.9 % (FLUSH) 0.9 %
10 SYRINGE (ML) INJECTION AS NEEDED
Status: DISCONTINUED | OUTPATIENT
Start: 2024-12-20 | End: 2024-12-23 | Stop reason: HOSPADM

## 2024-12-20 RX ORDER — IPRATROPIUM BROMIDE AND ALBUTEROL SULFATE 2.5; .5 MG/3ML; MG/3ML
3 SOLUTION RESPIRATORY (INHALATION)
Status: DISCONTINUED | OUTPATIENT
Start: 2024-12-21 | End: 2024-12-23 | Stop reason: HOSPADM

## 2024-12-20 RX ORDER — PANTOPRAZOLE SODIUM 40 MG/1
40 TABLET, DELAYED RELEASE ORAL DAILY
Status: DISCONTINUED | OUTPATIENT
Start: 2024-12-20 | End: 2024-12-23 | Stop reason: HOSPADM

## 2024-12-20 RX ORDER — CETIRIZINE HYDROCHLORIDE 10 MG/1
10 TABLET ORAL DAILY
Status: DISCONTINUED | OUTPATIENT
Start: 2024-12-20 | End: 2024-12-23 | Stop reason: HOSPADM

## 2024-12-20 RX ORDER — SODIUM CHLORIDE 9 MG/ML
40 INJECTION, SOLUTION INTRAVENOUS AS NEEDED
Status: DISCONTINUED | OUTPATIENT
Start: 2024-12-20 | End: 2024-12-23 | Stop reason: HOSPADM

## 2024-12-20 RX ORDER — ASPIRIN 325 MG
325 TABLET ORAL ONCE
Status: COMPLETED | OUTPATIENT
Start: 2024-12-20 | End: 2024-12-20

## 2024-12-20 RX ORDER — BUDESONIDE 0.5 MG/2ML
0.5 INHALANT ORAL
Status: DISCONTINUED | OUTPATIENT
Start: 2024-12-20 | End: 2024-12-23 | Stop reason: HOSPADM

## 2024-12-20 RX ORDER — OXYCODONE AND ACETAMINOPHEN 7.5; 325 MG/1; MG/1
1 TABLET ORAL EVERY 4 HOURS PRN
Status: DISCONTINUED | OUTPATIENT
Start: 2024-12-20 | End: 2024-12-23 | Stop reason: HOSPADM

## 2024-12-20 RX ORDER — AMIODARONE HYDROCHLORIDE 200 MG/1
200 TABLET ORAL
Status: DISCONTINUED | OUTPATIENT
Start: 2024-12-20 | End: 2024-12-23 | Stop reason: HOSPADM

## 2024-12-20 RX ORDER — AMOXICILLIN 250 MG
2 CAPSULE ORAL 2 TIMES DAILY
Status: DISCONTINUED | OUTPATIENT
Start: 2024-12-20 | End: 2024-12-23 | Stop reason: HOSPADM

## 2024-12-20 RX ORDER — FLUTICASONE PROPIONATE 50 MCG
2 SPRAY, SUSPENSION (ML) NASAL DAILY
Status: DISCONTINUED | OUTPATIENT
Start: 2024-12-20 | End: 2024-12-23 | Stop reason: HOSPADM

## 2024-12-20 RX ORDER — NITROGLYCERIN 0.4 MG/1
0.4 TABLET SUBLINGUAL
Status: DISCONTINUED | OUTPATIENT
Start: 2024-12-20 | End: 2024-12-23 | Stop reason: HOSPADM

## 2024-12-20 RX ORDER — DULOXETIN HYDROCHLORIDE 30 MG/1
60 CAPSULE, DELAYED RELEASE ORAL 2 TIMES DAILY
Status: DISCONTINUED | OUTPATIENT
Start: 2024-12-20 | End: 2024-12-23 | Stop reason: HOSPADM

## 2024-12-20 RX ORDER — GUAIFENESIN 600 MG/1
600 TABLET, EXTENDED RELEASE ORAL EVERY 12 HOURS SCHEDULED
Status: DISCONTINUED | OUTPATIENT
Start: 2024-12-20 | End: 2024-12-23 | Stop reason: HOSPADM

## 2024-12-20 RX ORDER — BENZONATATE 100 MG/1
100 CAPSULE ORAL 3 TIMES DAILY PRN
Status: DISCONTINUED | OUTPATIENT
Start: 2024-12-20 | End: 2024-12-23 | Stop reason: HOSPADM

## 2024-12-20 RX ORDER — VALSARTAN 80 MG/1
40 TABLET ORAL DAILY
Status: DISCONTINUED | OUTPATIENT
Start: 2024-12-20 | End: 2024-12-23 | Stop reason: HOSPADM

## 2024-12-20 RX ORDER — IPRATROPIUM BROMIDE AND ALBUTEROL SULFATE 2.5; .5 MG/3ML; MG/3ML
3 SOLUTION RESPIRATORY (INHALATION)
Status: DISCONTINUED | OUTPATIENT
Start: 2024-12-20 | End: 2024-12-20

## 2024-12-20 RX ORDER — ONDANSETRON 2 MG/ML
4 INJECTION INTRAMUSCULAR; INTRAVENOUS EVERY 6 HOURS PRN
Status: DISCONTINUED | OUTPATIENT
Start: 2024-12-20 | End: 2024-12-23 | Stop reason: HOSPADM

## 2024-12-20 RX ORDER — BISACODYL 5 MG/1
5 TABLET, DELAYED RELEASE ORAL DAILY PRN
Status: DISCONTINUED | OUTPATIENT
Start: 2024-12-20 | End: 2024-12-23 | Stop reason: HOSPADM

## 2024-12-20 RX ORDER — METOPROLOL SUCCINATE 25 MG/1
25 TABLET, EXTENDED RELEASE ORAL
Status: DISCONTINUED | OUTPATIENT
Start: 2024-12-20 | End: 2024-12-23 | Stop reason: HOSPADM

## 2024-12-20 RX ORDER — ATORVASTATIN CALCIUM 40 MG/1
40 TABLET, FILM COATED ORAL DAILY
Status: DISCONTINUED | OUTPATIENT
Start: 2024-12-20 | End: 2024-12-23 | Stop reason: HOSPADM

## 2024-12-20 RX ORDER — TRAZODONE HYDROCHLORIDE 50 MG/1
50 TABLET, FILM COATED ORAL NIGHTLY PRN
Status: DISCONTINUED | OUTPATIENT
Start: 2024-12-20 | End: 2024-12-23 | Stop reason: HOSPADM

## 2024-12-20 RX ORDER — POLYETHYLENE GLYCOL 3350 17 G/17G
17 POWDER, FOR SOLUTION ORAL DAILY PRN
Status: DISCONTINUED | OUTPATIENT
Start: 2024-12-20 | End: 2024-12-23 | Stop reason: HOSPADM

## 2024-12-20 RX ORDER — BISACODYL 10 MG
10 SUPPOSITORY, RECTAL RECTAL DAILY PRN
Status: DISCONTINUED | OUTPATIENT
Start: 2024-12-20 | End: 2024-12-23 | Stop reason: HOSPADM

## 2024-12-20 RX ORDER — SODIUM CHLORIDE 0.9 % (FLUSH) 0.9 %
10 SYRINGE (ML) INJECTION EVERY 12 HOURS SCHEDULED
Status: DISCONTINUED | OUTPATIENT
Start: 2024-12-20 | End: 2024-12-23 | Stop reason: HOSPADM

## 2024-12-20 RX ORDER — IPRATROPIUM BROMIDE AND ALBUTEROL SULFATE 2.5; .5 MG/3ML; MG/3ML
3 SOLUTION RESPIRATORY (INHALATION) EVERY 4 HOURS PRN
Status: DISCONTINUED | OUTPATIENT
Start: 2024-12-20 | End: 2024-12-23 | Stop reason: HOSPADM

## 2024-12-20 RX ORDER — ACETAMINOPHEN 650 MG/1
650 SUPPOSITORY RECTAL EVERY 4 HOURS PRN
Status: DISCONTINUED | OUTPATIENT
Start: 2024-12-20 | End: 2024-12-23 | Stop reason: HOSPADM

## 2024-12-20 RX ORDER — DEXAMETHASONE SODIUM PHOSPHATE 10 MG/ML
10 INJECTION, SOLUTION INTRAMUSCULAR; INTRAVENOUS ONCE
Status: COMPLETED | OUTPATIENT
Start: 2024-12-20 | End: 2024-12-20

## 2024-12-20 RX ORDER — METHYLPREDNISOLONE SODIUM SUCCINATE 40 MG/ML
40 INJECTION, POWDER, LYOPHILIZED, FOR SOLUTION INTRAMUSCULAR; INTRAVENOUS EVERY 8 HOURS
Status: DISCONTINUED | OUTPATIENT
Start: 2024-12-20 | End: 2024-12-22

## 2024-12-20 RX ORDER — IPRATROPIUM BROMIDE AND ALBUTEROL SULFATE 2.5; .5 MG/3ML; MG/3ML
3 SOLUTION RESPIRATORY (INHALATION) ONCE
Status: COMPLETED | OUTPATIENT
Start: 2024-12-20 | End: 2024-12-20

## 2024-12-20 RX ORDER — QUETIAPINE FUMARATE 25 MG/1
12.5 TABLET, FILM COATED ORAL NIGHTLY
Status: DISCONTINUED | OUTPATIENT
Start: 2024-12-20 | End: 2024-12-23 | Stop reason: HOSPADM

## 2024-12-20 RX ORDER — CLONAZEPAM 0.5 MG/1
1 TABLET ORAL 2 TIMES DAILY PRN
Status: DISCONTINUED | OUTPATIENT
Start: 2024-12-20 | End: 2024-12-23 | Stop reason: HOSPADM

## 2024-12-20 RX ORDER — OXYCODONE AND ACETAMINOPHEN 5; 325 MG/1; MG/1
1 TABLET ORAL ONCE
Status: COMPLETED | OUTPATIENT
Start: 2024-12-20 | End: 2024-12-20

## 2024-12-20 RX ADMIN — GUAIFENESIN 600 MG: 600 TABLET, EXTENDED RELEASE ORAL at 10:58

## 2024-12-20 RX ADMIN — BUDESONIDE 0.5 MG: 0.5 SUSPENSION RESPIRATORY (INHALATION) at 18:40

## 2024-12-20 RX ADMIN — IPRATROPIUM BROMIDE AND ALBUTEROL SULFATE 3 ML: 2.5; .5 SOLUTION RESPIRATORY (INHALATION) at 11:51

## 2024-12-20 RX ADMIN — VALSARTAN 40 MG: 80 TABLET, FILM COATED ORAL at 10:58

## 2024-12-20 RX ADMIN — IPRATROPIUM BROMIDE AND ALBUTEROL SULFATE 3 ML: 2.5; .5 SOLUTION RESPIRATORY (INHALATION) at 03:53

## 2024-12-20 RX ADMIN — OXYCODONE HYDROCHLORIDE AND ACETAMINOPHEN 1 TABLET: 5; 325 TABLET ORAL at 05:02

## 2024-12-20 RX ADMIN — DULOXETINE HYDROCHLORIDE 60 MG: 30 CAPSULE, DELAYED RELEASE ORAL at 20:43

## 2024-12-20 RX ADMIN — APIXABAN 5 MG: 5 TABLET, FILM COATED ORAL at 20:43

## 2024-12-20 RX ADMIN — PANTOPRAZOLE SODIUM 40 MG: 40 TABLET, DELAYED RELEASE ORAL at 10:59

## 2024-12-20 RX ADMIN — METHYLPREDNISOLONE SODIUM SUCCINATE 40 MG: 40 INJECTION, POWDER, FOR SOLUTION INTRAMUSCULAR; INTRAVENOUS at 20:00

## 2024-12-20 RX ADMIN — DIAZEPAM 2 MG: 2 TABLET ORAL at 05:45

## 2024-12-20 RX ADMIN — IPRATROPIUM BROMIDE AND ALBUTEROL SULFATE 3 ML: 2.5; .5 SOLUTION RESPIRATORY (INHALATION) at 18:40

## 2024-12-20 RX ADMIN — DEXAMETHASONE SODIUM PHOSPHATE 10 MG: 10 INJECTION INTRAMUSCULAR; INTRAVENOUS at 04:35

## 2024-12-20 RX ADMIN — FLUTICASONE PROPIONATE 2 SPRAY: 50 SPRAY, METERED NASAL at 10:57

## 2024-12-20 RX ADMIN — ASPIRIN 325 MG: 325 TABLET ORAL at 04:35

## 2024-12-20 RX ADMIN — BUDESONIDE 0.5 MG: 0.5 SUSPENSION RESPIRATORY (INHALATION) at 12:44

## 2024-12-20 RX ADMIN — AMIODARONE HYDROCHLORIDE 200 MG: 200 TABLET ORAL at 10:55

## 2024-12-20 RX ADMIN — CETIRIZINE HYDROCHLORIDE 10 MG: 10 TABLET, FILM COATED ORAL at 10:56

## 2024-12-20 RX ADMIN — CLONAZEPAM 1 MG: 0.5 TABLET ORAL at 18:35

## 2024-12-20 RX ADMIN — DULOXETINE HYDROCHLORIDE 60 MG: 30 CAPSULE, DELAYED RELEASE ORAL at 10:57

## 2024-12-20 RX ADMIN — GUAIFENESIN 600 MG: 600 TABLET, EXTENDED RELEASE ORAL at 20:42

## 2024-12-20 RX ADMIN — Medication 10 ML: at 20:43

## 2024-12-20 RX ADMIN — METOPROLOL SUCCINATE 25 MG: 25 TABLET, EXTENDED RELEASE ORAL at 10:57

## 2024-12-20 RX ADMIN — METHYLPREDNISOLONE SODIUM SUCCINATE 40 MG: 40 INJECTION, POWDER, FOR SOLUTION INTRAMUSCULAR; INTRAVENOUS at 11:06

## 2024-12-20 RX ADMIN — ONDANSETRON 4 MG: 2 INJECTION INTRAMUSCULAR; INTRAVENOUS at 18:35

## 2024-12-20 RX ADMIN — APIXABAN 5 MG: 5 TABLET, FILM COATED ORAL at 10:55

## 2024-12-20 RX ADMIN — ATORVASTATIN CALCIUM 40 MG: 40 TABLET, FILM COATED ORAL at 10:56

## 2024-12-20 RX ADMIN — HYDRALAZINE HYDROCHLORIDE 10 MG: 20 INJECTION INTRAMUSCULAR; INTRAVENOUS at 18:00

## 2024-12-20 RX ADMIN — QUETIAPINE FUMARATE 12.5 MG: 25 TABLET ORAL at 20:43

## 2024-12-20 NOTE — CASE MANAGEMENT/SOCIAL WORK
Discharge Planning Assessment  [facility]     Patient Name: Gabi Dudley  MRN: [id number]  Today's Date: 12/20/2024    Admit Date: 12/20/2024    Plan: The patient is awake and able to answer questions.  She is a current patient of Dr. Saunders and gets her medications from [facility].  She elects to enroll in Meds to Bed.  She has the following DME:  rollator, oxygen ([facility]), C-pap ([facility]), nebulizer, bedside commode, and shower chair.  She denies the need for additional DME or services at [place].  At the time of DC the patient plans to return home with her daughter and granddaughter.  Questions and concerns were addressed at the time of this conversation; IMM delivered at earlier time.  Will provide additional resources and information upon patient request.   Discharge Needs Assessment       Row Name 12/20/24 1112       Living Environment    People in Home child(ashely), adult;grandchild(ashely)    Name(s) of People in Home Malu Aguilar, daughter and 13 year old granddaughter    Current Living Arrangements home    Duration at Residence 13 years    Potentially Unsafe Housing Conditions none    In the past 12 months has the electric, gas, oil, or water company threatened to shut off services in your home? No    Primary Care Provided by self    Provides Primary Care For no one, unable/limited ability to care for self    Family Caregiver if Needed none    Quality of Family Relationships involved;supportive;helpful    Able to Return to Prior Arrangements yes       Resource/Environmental Concerns    Resource/Environmental Concerns none    Transportation Concerns none       Transportation Needs    In the past 12 months, has lack of transportation kept you from medical appointments or from getting medications? no    In the past 12 months, has lack of transportation kept you from meetings, work, or from getting things needed for daily living? No       Food Insecurity    Within the past 12 months, you worried that your  food would run out before you got the money to buy more. Never true    Within the past 12 months, the food you bought just didn't last and you didn't have money to get more. Never true       Transition Planning    Patient/Family Anticipates Transition to home with family    Patient/Family Anticipated Services at Transition none    Transportation Anticipated family or friend will provide       Discharge Needs Assessment    Readmission Within the Last 30 Days no previous admission in last 30 days    Equipment Currently Used at Home rollator;oxygen;cpap;nebulizer;commode;shower chair    Concerns to be Addressed denies needs/concerns at this time    Do you want help finding or keeping work or a job? I do not need or want help    Do you want help with school or training? For example, starting or completing job training or getting a high school diploma, GED or equivalent No    Anticipated Changes Related to Illness none    Equipment Needed After Discharge none    Provided Post Acute Provider List? N/A    N/A Provider List Comment Patient plans to return home with same DME agencies; no new needs at this time    Provided Post Acute Provider Quality & Resource List? N/A    N/A Quality & Resource List Comment Patient plans to return home with same DME agencies; no new needs at this time    Offered/Gave Vendor List no    Patient's Choice of Community Agency(s) Current with [facility] and [facility]    Current Discharge Risk chronically ill                   Discharge Plan       Row Name 12/20/24 1114       Plan    Plan The patient is awake and able to answer questions.  She is a current patient of Dr. Saunders and gets her medications from [facility].  She elects to enroll in Meds to Bed.  She has the following DME:  rollator, oxygen ([facility]), C-pap ([facility]), nebulizer, bedside commode, and shower chair.  She denies the need for additional DME or services at [place].  At the time of DC the patient plans to return home with her  daughter and granddaughter.  Questions and concerns were addressed at the time of this conversation; IMM delivered at earlier time.  Will provide additional resources and information upon patient request.    Patient/Family in Agreement with Plan yes    Provided Post Acute Provider List? N/A    N/A Provider List Comment Patient plans to return home with same DME agencies; no new needs at this time    Provided Post Acute Provider Quality & Resource List? N/A    N/A Quality & Resource List Comment Patient plans to return home with same DME agencies; no new needs at this time    Plan Comments Patient denies needs at this time    Final Discharge Disposition Code 01 - home or self-care    Final Note Patient plans to return home with family                  Continued Care and Services - Admitted Since 12/20/2024    No active coordination exists for this encounter.          Demographic Summary       Row Name 12/20/24 1111       General Information    Admission Type inpatient    Arrived From emergency department    Required Notices Provided Important Message from Medicare    Referral Source admission list    Reason for Consult discharge planning    Preferred Language English       Contact Information    Permission Granted to Share Info With                    Functional Status       Row Name 12/20/24 1111       Functional Status    Usual Activity Tolerance good    Current Activity Tolerance moderate       Physical Activity    On average, how many days per week do you engage in moderate to strenuous exercise (like a brisk walk)? 0 days    On average, how many minutes do you engage in exercise at this level? 0 min    Number of minutes of exercise per week 0       Assessment of Health Literacy    How often do you have someone help you read hospital materials? Occasionally    How often do you have problems learning about your medical condition because of difficulty understanding written information? Occasionally     How often do you have a problem understanding what is told to you about your medical condition? Occasionally    How confident are you filling out medical forms by yourself? Quite a bit    Health Literacy Good       Functional Status, IADL    Medications independent    Meal Preparation assistive person    Housekeeping assistive person    Laundry assistive person    Shopping assistive person    If for any reason you need help with day-to-day activities such as bathing, preparing meals, shopping, managing finances, etc., do you get the help you need? I get all the help I need       Mental Status    General Appearance WDL WDL       Mental Status Summary    Recent Changes in Mental Status/Cognitive Functioning no changes                   Psychosocial       Row Name 12/20/24 1111       Values/Beliefs    Spiritual, Cultural Beliefs, Confucianist Practices, Values that Affect Care no       Behavior WDL    Behavior WDL WDL       Emotion Mood WDL    Emotion/Mood/Affect WDL WDL       Speech WDL    Speech WDL WDL       Perceptual State WDL    Perceptual State WDL WDL       Thought Process WDL    Thought Process WDL WDL       Intellectual Performance WDL    Intellectual Performance WDL WDL                   Abuse/Neglect       Row Name 12/20/24 1111       Personal Safety    Feels Unsafe at Home or Work/School no    Feels Threatened by Someone no    Does Anyone Try to Keep You From Having Contact with Others or Doing Things Outside Your Home? no    Physical Signs of Abuse Present no                   Legal       Row Name 12/20/24 1111       Financial Resource Strain    How hard is it for you to pay for the very basics like food, housing, medical care, and heating? Not hard                   Substance Abuse       Row Name 12/20/24 1111       Substance Use    Substance Use Status never used    Substance Use Comment Patient is exposed to second hand cigarette smoke                   Patient Forms       Row Name 12/20/24 1115        Patient Forms    Important Message from Medicare (Formerly [facility]) Delivered    Delivered to Patient    Method of delivery In person                      Zara Huang RN

## 2024-12-20 NOTE — H&P
[facility]   HISTORY AND PHYSICAL      Name:  aGbi Dudley   Age:  79 y.o.  Sex:  female  :  1945  MRN:  [id number]   Visit Number:  [id number]  Admission Date:  2024  Date Of Service:  24  Primary Care Physician:  Krystina Saunders DO    Chief Complaint:     Shortness of breath.    History Of Presenting Illness:      Gabi Dudley is a 79-year-old female with history of COPD on home oxygen at 4 L, hypertension, paroxysmal atrial fibrillation on apixaban, chronic back pain, GERD, generalized anxiety disorder, CAD, chronic anemia was brought to the emergency room by EMS with symptoms of shortness of breath.  Patient states that she has a CPAP machine at home but unfortunately has not been compliant.  She states that she quit smoking several years ago.  She lives with her daughter who apparently smokes.  Denies any fevers but does complain of generalized weakness and cough.    In the emergency room, she was afebrile but tachypneic at 32 with initial blood pressure 193/95 and pulse oxygen saturation of 97% on 4 L of nasal cannula oxygen.  Venous blood gas showed pH of 7.31, pCO2 87, pO2 17 and bicarb of 44.  Initial troponin 15 with repeat at 12.  proBNP 1444.  CMP was fairly unremarkable except for a CO2 of 34, alkaline phosphatase 165, AST 56, ALT 49.  CBC was unremarkable except for hemoglobin of 9.7 which seems to be her baseline.  COVID and flu test were negative.  Chest x-ray showed chronic bronchovascular markings.  EKG was negative for any acute changes.  Patient was given aspirin, dexamethasone, bronchodilator nebulizations in the emergency room.  She was placed on BiPAP therapy and due to poor tolerance, she was given Valium and oxycodone.  She was subsequently admitted to the medical floor with telemetry for management of acute COPD exacerbation, acute on chronic hypoxic and hypercapnic respiratory failure.    Review Of Systems:    All systems were  reviewed and negative except as mentioned in history of presenting illness, assessment and plan.    Past Medical History: Patient's  has a past medical history of Adrenal adenoma, Anemia, Arrhythmia, Asthma, Atrial fibrillation, Back pain, Benign colonic polyp, Benign tumor of adrenal gland, Cataract, Cholelithiasis, Chronic bronchitis, Chronic bronchitis with COPD (chronic obstructive pulmonary disease), COPD (chronic obstructive pulmonary disease) (2005), Coronary artery disease, Depression, Diverticulosis (Years ago), Elevated cholesterol, Environmental and seasonal allergies, Fibromyalgia, Fibromyalgia, primary (Sometime in the 90s), Gastritis, Generalized anxiety disorder, GERD (gastroesophageal reflux disease), H/O mammogram, Headache (Years ago), Hemorrhoids, History of blood transfusion (1985), History of blood transfusion (1985), History of echocardiogram, History of endometriosis, History of nuclear stress test, Hospitalization or health care facility admission within last 6 months, Hypertension, IBS (irritable bowel syndrome), Impaired functional mobility, balance, gait, and endurance, Impaired mobility, Inverted nipple, Kidney disease, Kidney stone, Liver cyst, Low back pain (Years ago), Nodular radiologic density, Nodule of left lung, NSTEMI (non-ST elevated myocardial infarction) (9/24/2024), On home oxygen therapy, Osteoarthritis, Osteopenia (Several years ago), Osteoporosis, PONV (postoperative nausea and vomiting), Renal cyst, Sinus problem, Sinusitis, Skin cancer, SOB (shortness of breath), Tobacco use, Urinary frequency, Urinary tract infection (Years ago), Vitamin D deficiency, Wears glasses, and Wears partial dentures.    Past Surgical History: Patient's  has a past surgical history that includes Appendectomy (1980s); Tonsillectomy (1987); Colonoscopy w/ biopsies and polypectomy; Colonoscopy (03/11/2013); Colonoscopy (06/21/2016); Upper gastrointestinal endoscopy (01/13/2015); Vaginal delivery;  Colonoscopy (N/A, 07/24/2019); Cataract extraction (2013); Hysterectomy (1980s); Cardiac catheterization (2003); Abdominal adhesion surgery; cholecystectomy with intraoperative cholangiogram (N/A, 10/30/2020); Exploratory Laparotomy (N/A, 11/23/2020); Cholecystectomy (2020); Colon surgery (2020); and Exploratory Laparotomy (N/A, 8/9/2023).    Social History: Patient's  reports that she quit smoking about 4 years ago. Her smoking use included cigarettes. She started smoking about 34 years ago. She has a 7.5 pack-year smoking history. She has been exposed to tobacco smoke. She has never used smokeless tobacco. She reports that she does not drink alcohol and does not use drugs.    Family History:  Patient's family history includes Arthritis in her father; Brain cancer in her father; Cancer in her father; Colon cancer in her maternal aunt; Heart disease in her mother; Hypertension in her father; Lung cancer in her paternal grandfather; Stomach cancer in her brother.    Allergies:      Ativan [lorazepam] and Doxycycline    Home Medications:    Prior to Admission Medications       Prescriptions Last Dose Informant Patient Reported? Taking?    albuterol (PROVENTIL) (2.5 MG/3ML) 0.083% nebulizer solution   No No    Take 2.5 mg by nebulization Every 4 (Four) Hours As Needed for Wheezing.    albuterol sulfate  (90 Base) MCG/ACT inhaler   No No    Inhale 2 puffs Every 4 (Four) Hours As Needed for Wheezing.    amiodarone (PACERONE) 200 MG tablet   No No    Take 1 tablet by mouth Daily.    apixaban (Eliquis) 5 MG tablet tablet   No No    Take 1 tablet by mouth Every 12 (Twelve) Hours.    atorvastatin (Lipitor) 40 MG tablet   No No    Take 1 tablet by mouth Daily.    benzonatate (TESSALON) 100 MG capsule   No No    Take 1 capsule by mouth 3 (Three) Times a Day As Needed for Cough.    Budeson-Glycopyrrol-Formoterol (Breztri Aerosphere) 160-9-4.8 MCG/ACT aerosol inhaler   No No    Inhale 2 puffs 2 (Two) Times a Day. Rinse  mouth out after use    cetirizine (zyrTEC) 10 MG tablet   No No    TAKE 1 TABLET BY MOUTH DAILY.    clonazePAM (KlonoPIN) 1 MG tablet   No No    Take 1 tablet by mouth 2 (Two) Times a Day As Needed for Anxiety.    clotrimazole-betamethasone (Lotrisone) 1-0.05 % cream   No No    Apply 1 Application topically to the appropriate area as directed 2 (Two) Times a Day.    Diclofenac Sodium (VOLTAREN) 1 % gel gel   No No    Apply 4 g topically to the appropriate area as directed 4 (Four) Times a Day As Needed (pain).    DULoxetine (CYMBALTA) 60 MG capsule   No No    TAKE 1 CAPSULE BY MOUTH 2 (TWO) TIMES A DAY.    fluticasone (FLONASE) 50 MCG/ACT nasal spray   No No    Administer 2 sprays into the nostril(s) as directed by provider Daily.    ipratropium-albuterol (DUO-NEB) 0.5-2.5 mg/3 ml nebulizer   No No    USE 3 mL VIA NEBULIZER EVERY 4 HOURS AS NEEDED FOR WHEEZING    metoprolol succinate XL (TOPROL-XL) 25 MG 24 hr tablet   No No    Take 1 tablet by mouth Daily.    O2 (OXYGEN)   Yes No    Inhale 2 L/min As Needed.    ondansetron ODT (ZOFRAN-ODT) 4 MG disintegrating tablet   No No    Place 1 tablet on the tongue Every 8 (Eight) Hours As Needed for Vomiting or Nausea.    oxyCODONE-acetaminophen (PERCOCET) 7.5-325 MG per tablet   No No    Take 1 tablet by mouth Every 4 (Four) Hours As Needed for Moderate Pain or Severe Pain for up to 5 doses.    pantoprazole (PROTONIX) 40 MG EC tablet   No No    Take 1 tablet by mouth Daily.    QUEtiapine (SEROquel) 25 MG tablet   No No    Take 0.5 tablets by mouth Every Night.    sertraline (ZOLOFT) 100 MG tablet   No No    TAKE 1 & 1/2 TABLETS BY MOUTH DAILY    sodium chloride 0.65 % nasal spray   No No    2 sprays into the nostril(s) as directed by provider As Needed (dry nose).    traZODone (DESYREL) 100 MG tablet   No No    TAKE 1 TABLET EVERY NIGHT AT BEDTIME BY MOUTH AS NEEDED    valsartan (Diovan) 40 MG tablet   No No    Take 1 tablet by mouth Daily.     ED  "Medications:    Medications   sodium chloride 0.9 % flush 10 mL (has no administration in time range)   nitroglycerin (NITROSTAT) SL tablet 0.4 mg (has no administration in time range)   ipratropium-albuterol (DUO-NEB) nebulizer solution 3 mL (3 mL Nebulization Given 12/20/24 9783)   dexAMETHasone sodium phosphate injection 10 mg (10 mg Intravenous Given 12/20/24 0435)   aspirin tablet 325 mg (325 mg Oral Given 12/20/24 0435)   oxyCODONE-acetaminophen (PERCOCET) 5-325 MG per tablet 1 tablet (1 tablet Oral Given 12/20/24 0502)   diazePAM (VALIUM) tablet 2 mg (2 mg Oral Given 12/20/24 0545)     Vital Signs:  Temp:  [98.7 °F (37.1 °C)] 98.7 °F (37.1 °C)  Heart Rate:  [68-90] 68  Resp:  [32] 32  BP: (125-193)/() 145/107        12/20/24  0332   Weight: 63.5 kg (140 lb)     Body mass index is 150.3 kg/m².    Physical Exam:     Most recent vital Signs: BP (!) 145/107   Pulse 68   Temp 98.7 °F (37.1 °C) (Oral)   Resp (!) 32   Ht 65 cm (25.59\")   Wt 63.5 kg (140 lb)   SpO2 94%   .30 kg/m²     Physical Exam  Constitutional:       General: She is not in acute distress.     Appearance: She is ill-appearing.   HENT:      Head: Normocephalic and atraumatic.      Right Ear: External ear normal.      Left Ear: External ear normal.      Nose: Nose normal.      Mouth/Throat:      Mouth: Mucous membranes are moist.   Eyes:      Extraocular Movements: Extraocular movements intact.      Conjunctiva/sclera: Conjunctivae normal.   Cardiovascular:      Rate and Rhythm: Normal rate and regular rhythm.      Pulses: Normal pulses.      Heart sounds: Normal heart sounds. No murmur heard.  Pulmonary:      Effort: No respiratory distress.      Breath sounds: Wheezing and rales present.   Abdominal:      General: Bowel sounds are normal.      Palpations: Abdomen is soft.      Tenderness: There is no abdominal tenderness. There is no guarding or rebound.   Musculoskeletal:         General: Normal range of motion.      Cervical " back: Neck supple.      Right lower leg: No edema.      Left lower leg: No edema.   Skin:     General: Skin is warm.      Coloration: Skin is pale.      Findings: No erythema or rash.   Neurological:      General: No focal deficit present.      Mental Status: She is alert and oriented to person, place, and time. Mental status is at baseline.   Psychiatric:         Mood and Affect: Mood normal.         Behavior: Behavior normal.       Laboratory data:    I have reviewed the labs done in the emergency room.    Results from last 7 days   Lab Units 12/20/24  0337   SODIUM mmol/L 137   POTASSIUM mmol/L 4.7   CHLORIDE mmol/L 96*   CO2 mmol/L 33.7*   BUN mg/dL 18   CREATININE mg/dL 0.55*   CALCIUM mg/dL 9.2   BILIRUBIN mg/dL <0.2   ALK PHOS U/L 165*   ALT (SGPT) U/L 49*   AST (SGOT) U/L 56*   GLUCOSE mg/dL 117*     Results from last 7 days   Lab Units 12/20/24  0337   WBC 10*3/mm3 5.18   HEMOGLOBIN g/dL 9.7*   HEMATOCRIT % 33.5*   PLATELETS 10*3/mm3 172       Results from last 7 days   Lab Units 12/20/24  0506 12/20/24  0337   HSTROP T ng/L 12 15*     Results from last 7 days   Lab Units 12/20/24  0337   PROBNP pg/mL 1,444.0     EKG:      EKG done in the emergency room was reviewed by me.  It shows sinus rhythm at 88 bpm.  Normal axis.  Borderline LVH noted.  ST flattening noted in the inferior limb leads.    Radiology:    Portable chest x-ray done in the emergency room was reviewed by me.  It shows prominent bronchovascular markings bilaterally.  No cardiomegaly.  No significant infiltrates noted.  An official report is currently pending.    Assessment:    Acute on chronic hypoxic and hypercapnic respiratory failure, POA.  Acute COPD exacerbation, POA.  Essential hypertension, uncontrolled, POA.  Paroxysmal atrial fibrillation on apixaban.  Mild transaminitis, of uncertain etiology.  Depression and anxiety.    Plan:    Respiratory failure/COPD exacerbation.  - Continue nasal cannula oxygen and intermittent BiPAP  therapy.  - Bronchodilators, budesonide, IV Solu-Medrol.  - COVID and flu test were negative.    Paroxysmal atrial fibrillation.  - Continue amiodarone, apixaban, Toprol-XL.    Essential hypertension.  - Continue Toprol-XL, losartan.    I will order physical therapy consult.  Discussed with nursing staff.    Risk Assessment: Moderate  DVT Prophylaxis: Apixaban  Code Status: Full  Diet: Cardiac      Lenny Dewitt MD  12/20/24  07:17 EST    Dictated utilizing Dragon dictation.

## 2024-12-20 NOTE — ED PROVIDER NOTES
[facility] EMERGENCY DEPARTMENT  Emergency Department Encounter  Emergency Medicine Physician Note       Pt Name: Gabi Dudley  MRN: [id number]  Pt :   1945  Room Number:    Date of encounter:  2024  PCP: Krystina Saunders DO  ED Provider: Den Vaughn MD    Historian: Patient and EMS      HPI:  Chief Complaint: Difficulty breathing        Context: Gabi Dudley is a 79 y.o. female who presents to the ED c/o difficulty breathing.  Patient states she has been having difficulty breathing since noon.  Was unable to get any of her family members to call an ambulance all day until finally tonight when she was screaming for help.  States that she has been having chest pain and difficulty breathing.  Normally works 4 L nasal cannula.  Did not take meds for her A-fib today.  Denies fall or trauma.  Denies fever.      PAST MEDICAL HISTORY  Past Medical History:   Diagnosis Date    Adrenal adenoma     Anemia     Arrhythmia     Asthma     Atrial fibrillation     Back pain     Benign colonic polyp     Benign tumor of adrenal gland     Cataract     bilateral    Cholelithiasis     Chronic bronchitis     Chronic bronchitis with COPD (chronic obstructive pulmonary disease)     COPD (chronic obstructive pulmonary disease)     Coronary artery disease     Depression     Diverticulosis Years ago    Elevated cholesterol     Environmental and seasonal allergies     Fibromyalgia     Fibromyalgia, primary Sometime in the 90s    Gastritis     Generalized anxiety disorder     GERD (gastroesophageal reflux disease)     H/O mammogram     Headache Years ago    Hemorrhoids     History of blood transfusion     History of blood transfusion     History of echocardiogram     History of endometriosis     History of nuclear stress test     Hospitalization or health care facility admission within last 6 months     5 times between 2022-2023    Hypertension     IBS (irritable bowel  syndrome)     Impaired functional mobility, balance, gait, and endurance     Impaired mobility     Inverted nipple     Kidney disease     Kidney stone     Liver cyst     Low back pain Years ago    Nodular radiologic density     Nodule of left lung     NSTEMI (non-ST elevated myocardial infarction) 9/24/2024    On home oxygen therapy     2 liters NC QHS    Osteoarthritis     Osteopenia Several years ago    Osteoporosis     PONV (postoperative nausea and vomiting)     Renal cyst     Sinus problem     2014    Sinusitis     Skin cancer     basal cell carcinoma    SOB (shortness of breath)     Tobacco use     Urinary frequency     Urinary tract infection Years ago    Frequent UTIs    Vitamin D deficiency     Wears glasses     Wears partial dentures     upper plate         PAST SURGICAL HISTORY  Past Surgical History:   Procedure Laterality Date    APPENDECTOMY  1980s    CARDIAC CATHETERIZATION  2003    CATARACT EXTRACTION  2013    both eyes    CHOLECYSTECTOMY  2020    CHOLECYSTECTOMY WITH INTRAOPERATIVE CHOLANGIOGRAM N/A 10/30/2020    Procedure: CHOLECYSTECTOMY LAPAROSCOPIC INTRAOPERATIVE CHOLANGIOGRAPHY;  Surgeon: Sima Moses MD;  Location: [facility] OR;  Service: General;  Laterality: N/A;    COLON SURGERY  2020    COLONOSCOPY  03/11/2013    COLONOSCOPY  06/21/2016    COLONOSCOPY N/A 07/24/2019    Procedure: COLONOSCOPY W/ COLD FORCEP POLYPECTOMIES; HOT SNARE POLYPECTOMIES; COLD SNARE POLYPECTOMY;  Surgeon: Goyo Nunez MD;  Location: [facility] ENDOSCOPY;  Service: Gastroenterology    COLONOSCOPY W/ BIOPSIES AND POLYPECTOMY      EXPLORATORY LAPAROTOMY N/A 11/23/2020    Procedure: colectomy, right, closure of enterotomy x 2, reduction of internal volvulus;  Surgeon: Sima Moses MD;  Location: [facility] OR;  Service: General;  Laterality: N/A;    EXPLORATORY LAPAROTOMY N/A 8/9/2023    Procedure: LAPAROTOMY EXPLORATORY WITH LYIS OF ADHESIONS AND  CENTRAL LINE INSERTION;  Surgeon: Bianca Isaac DO;  Location:   [facility] OR;  Service: General;  Laterality: N/A;    HYSTERECTOMY      partial    LYSIS OF ABDOMINAL ADHESIONS      TONSILLECTOMY      UPPER GASTROINTESTINAL ENDOSCOPY  2015    VAGINAL DELIVERY      x2         FAMILY HISTORY  Family History   Problem Relation Age of Onset    Stomach cancer Brother     Colon cancer Maternal Aunt     Brain cancer Father     Arthritis Father     Hypertension Father     Cancer Father         Father, brother, and aunt    Lung cancer Paternal Grandfather     Heart disease Mother         Mother passed away due to heart disease    Breast cancer Neg Hx     Ovarian cancer Neg Hx          SOCIAL HISTORY  Social History     Socioeconomic History    Marital status:    Tobacco Use    Smoking status: Former     Current packs/day: 0.00     Average packs/day: 0.3 packs/day for 30.0 years (7.5 ttl pk-yrs)     Types: Cigarettes     Start date: 1990     Quit date: 2020     Years since quittin.1     Passive exposure: Past    Smokeless tobacco: Never   Vaping Use    Vaping status: Never Used   Substance and Sexual Activity    Alcohol use: No    Drug use: No    Sexual activity: Not Currently     Birth control/protection: Post-menopausal         ALLERGIES  Ativan [lorazepam] and Doxycycline        REVIEW OF SYSTEMS  Review of Systems     All systems reviewed and negative except for those discussed in HPI.       PHYSICAL EXAM    I have reviewed the triage vital signs and nursing notes.    ED Triage Vitals   Temp Heart Rate Resp BP SpO2   24 0332 24 0332 24 0332 24 0332 24 0334   98.7 °F (37.1 °C) 90 (!) 32 (!) 193/95 97 %      Temp src Heart Rate Source Patient Position BP Location FiO2 (%)   24 0332 24 0332 24 0332 24 033 --   Oral Monitor Sitting Left arm        Physical Exam    General:  Awake, alert, elderly, frail, visible increased work of breathing  HEENT: Atraumatic, normocephalic, EOMI, PERRLA, mucous membranes  moist  NECK:  Supple, atraumatic  Cardiovascular:  Regular rate, regular rhythm, no murmurs, rubs, or gallops.  Extremities well perfused   Respiratory: Significant expiratory wheezing.  Tachypneic.  Abdominal:  Soft, nondistended, nontender.  No guarding or rebound.  No palpable masses  Extremity:  No visible bony abnormalities in all 4 extremities.  Full range of motion of all extremities.  Skin:  Warm and dry.  No rashes  Neuro:  AAOx3, GCS 15. Cranial nerves 2-12 grossly intact.  No focal strength or sensation deficits.  Psych:  Mood and affect appropriate.        LAB RESULTS  Recent Results (from the past 24 hours)   Comprehensive Metabolic Panel    Collection Time: 12/20/24  3:37 AM    Specimen: Blood   Result Value Ref Range    Glucose 117 (H) 65 - 99 mg/dL    BUN 18 8 - 23 mg/dL    Creatinine 0.55 (L) 0.57 - 1.00 mg/dL    Sodium 137 136 - 145 mmol/L    Potassium 4.7 3.5 - 5.2 mmol/L    Chloride 96 (L) 98 - 107 mmol/L    CO2 33.7 (H) 22.0 - 29.0 mmol/L    Calcium 9.2 8.6 - 10.5 mg/dL    Total Protein 6.8 6.0 - 8.5 g/dL    Albumin 3.7 3.5 - 5.2 g/dL    ALT (SGPT) 49 (H) 1 - 33 U/L    AST (SGOT) 56 (H) 1 - 32 U/L    Alkaline Phosphatase 165 (H) 39 - 117 U/L    Total Bilirubin <0.2 0.0 - 1.2 mg/dL    Globulin 3.1 gm/dL    A/G Ratio 1.2 g/dL    BUN/Creatinine Ratio 32.7 (H) 7.0 - 25.0    Anion Gap 7.3 5.0 - 15.0 mmol/L    eGFR 94.5 >60.0 mL/min/1.73   High Sensitivity Troponin T    Collection Time: 12/20/24  3:37 AM    Specimen: Blood   Result Value Ref Range    HS Troponin T 15 (H) <14 ng/L   CBC Auto Differential    Collection Time: 12/20/24  3:37 AM    Specimen: Blood   Result Value Ref Range    WBC 5.18 3.40 - 10.80 10*3/mm3    RBC 3.80 3.77 - 5.28 10*6/mm3    Hemoglobin 9.7 (L) 12.0 - 15.9 g/dL    Hematocrit 33.5 (L) 34.0 - 46.6 %    MCV 88.2 79.0 - 97.0 fL    MCH 25.5 (L) 26.6 - 33.0 pg    MCHC 29.0 (L) 31.5 - 35.7 g/dL    RDW 14.7 12.3 - 15.4 %    RDW-SD 47.6 37.0 - 54.0 fl    MPV 10.9 6.0 - 12.0 fL     Platelets 172 140 - 450 10*3/mm3    Neutrophil % 71.4 42.7 - 76.0 %    Lymphocyte % 19.3 (L) 19.6 - 45.3 %    Monocyte % 7.3 5.0 - 12.0 %    Eosinophil % 1.0 0.3 - 6.2 %    Basophil % 0.6 0.0 - 1.5 %    Immature Grans % 0.4 0.0 - 0.5 %    Neutrophils, Absolute 3.70 1.70 - 7.00 10*3/mm3    Lymphocytes, Absolute 1.00 0.70 - 3.10 10*3/mm3    Monocytes, Absolute 0.38 0.10 - 0.90 10*3/mm3    Eosinophils, Absolute 0.05 0.00 - 0.40 10*3/mm3    Basophils, Absolute 0.03 0.00 - 0.20 10*3/mm3    Immature Grans, Absolute 0.02 0.00 - 0.05 10*3/mm3    nRBC 0.0 0.0 - 0.2 /100 WBC   BNP    Collection Time: 12/20/24  3:37 AM    Specimen: Blood   Result Value Ref Range    proBNP 1,444.0 0.0 - 1,800.0 pg/mL   Scan Slide    Collection Time: 12/20/24  3:37 AM    Specimen: Blood   Result Value Ref Range    Hypochromia Slight/1+ None Seen    WBC Morphology Normal Normal    Platelet Estimate Adequate Normal   COVID-19 and FLU A/B PCR, 1 HR TAT - Swab, Nasopharynx    Collection Time: 12/20/24  3:40 AM    Specimen: Nasopharynx; Swab   Result Value Ref Range    COVID19 Not Detected Not Detected - Ref. Range    Influenza A PCR Not Detected Not Detected    Influenza B PCR Not Detected Not Detected   Blood Gas, Venous With Co-Ox    Collection Time: 12/20/24  4:25 AM    Specimen: Venous Blood   Result Value Ref Range    Site OTHER     pH, Venous 7.314 (L) 7.320 - 7.420 pH Units    pCO2, Venous 86.6 (H) 40.0 - 50.0 mm Hg    pO2, Venous 16.8 (L) 30.0 - 50.0 mm Hg    HCO3, Venous 44.0 (H) 22.0 - 28.0 mmol/L    Base Excess, Venous 14.8 (H) 0.0 - 2.0 mmol/L    O2 Saturation, Venous 20.9 (L) 45.0 - 75.0 %    Oxyhemoglobin Venous 20.5 (L) 40.0 - 70.0 %    Methemoglobin Venous 0.9 0.0 - 3.0 %    Carboxyhemoglobin Venous 1.0 0.0 - 5.0 %    Barometric Pressure for Blood Gas 733 mmHg    Modality Nasal Cannula     FIO2 <21 %    Ventilator Mode NA     Notified Who MD     Notified By [id number]     Notified Time 12/20/2024 04:26     Collected by [id number]    High  Sensitivity Troponin T 1Hr    Collection Time: 12/20/24  5:06 AM    Specimen: Blood   Result Value Ref Range    HS Troponin T 12 <14 ng/L    Troponin T Numeric Delta -3 ng/L    Troponin T % Delta -20 Abnormal if >/= 20% %       If labs were ordered, I independently evaluated the results and considered them in treating the patient.        RADIOLOGY  No Radiology Exams Resulted Within Past 24 Hours    I ordered and independently evaluated the above noted radiographic studies.     See radiologist's dictation for official interpretation.        PROCEDURES    Critical Care    Performed by: Den Vaughn MD  Authorized by: Den Vaughn MD    Critical care provider statement:     Critical care time (minutes):  36    Critical care time was exclusive of:  Separately billable procedures and treating other patients and teaching time    Critical care was necessary to treat or prevent imminent or life-threatening deterioration of the following conditions:  Respiratory failure    Critical care was time spent personally by me on the following activities:  Development of treatment plan with patient or surrogate, evaluation of patient's response to treatment, examination of patient, obtaining history from patient or surrogate, review of old charts, re-evaluation of patient's condition, pulse oximetry, ordering and review of radiographic studies, ordering and review of laboratory studies and ordering and performing treatments and interventions    I assumed direction of critical care for this patient from another provider in my specialty: no      Care discussed with: admitting provider        ECG 12 Lead Chest Pain   Final Result          MEDICATIONS GIVEN IN ER    Medications   sodium chloride 0.9 % flush 10 mL (has no administration in time range)   nitroglycerin (NITROSTAT) SL tablet 0.4 mg (has no administration in time range)   diazePAM (VALIUM) tablet 2 mg (has no administration in time range)   ipratropium-albuterol  (DUO-NEB) nebulizer solution 3 mL (3 mL Nebulization Given 12/20/24 8545)   dexAMETHasone sodium phosphate injection 10 mg (10 mg Intravenous Given 12/20/24 0135)   aspirin tablet 325 mg (325 mg Oral Given 12/20/24 0435)   oxyCODONE-acetaminophen (PERCOCET) 5-325 MG per tablet 1 tablet (1 tablet Oral Given 12/20/24 8252)         MEDICAL DECISION MAKING, PROGRESS, and CONSULTS    All labs, if obtained, have been independently reviewed by me.  All radiology studies, if obtained, have been evaluated by me and the radiologist dictating the report.  All EKG's, if obtained, have been independently viewed and interpreted by me.      Discussion below represents my analysis of pertinent findings related to patient's condition, differential diagnosis, treatment plan and final disposition.    Gabi Dudley is a 79 y.o. female who presents to the ED c/o difficulty breathing.  Significantly hypertensive on arrival with blood pressure 193/95.  Tachypneic as well with increased work of breathing and wheezing.  Oxygen saturation of 97% on 4 L nasal cannula.  Differential includes was not limited to COPD exacerbation, pneumothorax, pulmonary edema, myocardial infarction, angina, pneumonia, pleural effusion.  Extensive labs ordered along with chest x-ray and EKG. given duration of time patient states that she was having symptoms and lack of reported response from family, concern for APSO report was started.    EKG personally interpreted showing normal sinus rhythm with rate of 88 with no evidence of acute ischemic change/STEMI.  Patient placed on cardiac monitoring.    Patient given DuoNeb treatment along with IV dexamethasone.    Chest x-ray personally visualized and interpreted showed emphysematous lungs but no evidence of large focal pneumonia or acute cardiopulmonary abnormality.    Breathing and blood pressure significantly improving following nebulizer treatment and steroid.    Labs show no significant leukocytosis.  Chronic  anemia with hemoglobin of 9.7 and hematocrit of 33.5.  VBG obtained which shows evidence of respiratory acidosis with pH of 7.314 and elevated pCO2 of 86.6 along with decreased pO2 of 16.8.  Attempted BiPAP however patient did not tolerating it well.  Offered patient anxiolysis and patient agreeable to try BiPAP/CPAP again after oral Valium.  Patient given 2 mg of oral Valium.    Discussed patient's case with hospitalist who is agreeable with plan for admission for further treatment.                                   Orders placed during this visit:  Orders Placed This Encounter   Procedures    Critical Care    COVID-19 and FLU A/B PCR, 1 HR TAT - Swab, Nasopharynx    XR Chest 1 View    Comprehensive Metabolic Panel    High Sensitivity Troponin T    BNP    CBC Auto Differential    Blood Gas, Venous -With Co-Ox Panel: Yes    Scan Slide    High Sensitivity Troponin T 1Hr    Blood Gas, Venous With Co-Ox    Contact adult protection    NIPPV (CPAP or BIPAP)    ECG 12 Lead Chest Pain    Insert Peripheral IV    CBC & Differential         ED Course:    Consultants: Hospitalist, Dr. Urban                Shared Decision Making:  After my consideration of clinical presentation and any laboratory/radiology studies obtained, I discussed the findings with the patient/patient representative who is in agreement with the treatment plan and the final disposition.   Risks and benefits of discharge and/or observation/admission were discussed.      AS OF 05:41 EST VITALS:    BP - 145/72  HR - 77  TEMP - 98.7 °F (37.1 °C) (Oral)  O2 SATS - 98%                  DIAGNOSIS  Final diagnoses:   COPD with acute exacerbation   Acute respiratory failure with hypoxia and hypercapnia   Respiratory acidosis   Hypertension, unspecified type         DISPOSITION  Admit      Please note that portions of this document were completed with voice recognition software.        Den Vaughn MD  12/20/24 7608

## 2024-12-21 LAB
ANION GAP SERPL CALCULATED.3IONS-SCNC: 8.2 MMOL/L (ref 5–15)
BUN SERPL-MCNC: 22 MG/DL (ref 8–23)
BUN/CREAT SERPL: 45.8 (ref 7–25)
CALCIUM SPEC-SCNC: 9.6 MG/DL (ref 8.6–10.5)
CHLORIDE SERPL-SCNC: 98 MMOL/L (ref 98–107)
CO2 SERPL-SCNC: 34.8 MMOL/L (ref 22–29)
CREAT SERPL-MCNC: 0.48 MG/DL (ref 0.57–1)
DEPRECATED RDW RBC AUTO: 46.3 FL (ref 37–54)
EGFRCR SERPLBLD CKD-EPI 2021: 97.7 ML/MIN/1.73
ERYTHROCYTE [DISTWIDTH] IN BLOOD BY AUTOMATED COUNT: 15.2 % (ref 12.3–15.4)
GLUCOSE SERPL-MCNC: 133 MG/DL (ref 65–99)
HCT VFR BLD AUTO: 32.7 % (ref 34–46.6)
HGB BLD-MCNC: 9.8 G/DL (ref 12–15.9)
MCH RBC QN AUTO: 25.2 PG (ref 26.6–33)
MCHC RBC AUTO-ENTMCNC: 30 G/DL (ref 31.5–35.7)
MCV RBC AUTO: 84.1 FL (ref 79–97)
PLATELET # BLD AUTO: 186 10*3/MM3 (ref 140–450)
PMV BLD AUTO: 10.5 FL (ref 6–12)
POTASSIUM SERPL-SCNC: 4.2 MMOL/L (ref 3.5–5.2)
RBC # BLD AUTO: 3.89 10*6/MM3 (ref 3.77–5.28)
SODIUM SERPL-SCNC: 141 MMOL/L (ref 136–145)
WBC NRBC COR # BLD AUTO: 5.45 10*3/MM3 (ref 3.4–10.8)

## 2024-12-21 PROCEDURE — 80048 BASIC METABOLIC PNL TOTAL CA: CPT | Performed by: INTERNAL MEDICINE

## 2024-12-21 PROCEDURE — 85027 COMPLETE CBC AUTOMATED: CPT | Performed by: INTERNAL MEDICINE

## 2024-12-21 PROCEDURE — 99232 SBSQ HOSP IP/OBS MODERATE 35: CPT | Performed by: INTERNAL MEDICINE

## 2024-12-21 PROCEDURE — 25010000002 METHYLPREDNISOLONE PER 40 MG: Performed by: INTERNAL MEDICINE

## 2024-12-21 RX ORDER — LIDOCAINE 4 G/G
1 PATCH TOPICAL
Status: DISCONTINUED | OUTPATIENT
Start: 2024-12-21 | End: 2024-12-23 | Stop reason: HOSPADM

## 2024-12-21 RX ADMIN — LIDOCAINE 1 PATCH: 4 PATCH TOPICAL at 08:40

## 2024-12-21 RX ADMIN — OXYCODONE HYDROCHLORIDE AND ACETAMINOPHEN 1 TABLET: 7.5; 325 TABLET ORAL at 08:39

## 2024-12-21 RX ADMIN — METOPROLOL SUCCINATE 25 MG: 25 TABLET, EXTENDED RELEASE ORAL at 08:40

## 2024-12-21 RX ADMIN — QUETIAPINE FUMARATE 12.5 MG: 25 TABLET ORAL at 20:33

## 2024-12-21 RX ADMIN — DULOXETINE HYDROCHLORIDE 60 MG: 30 CAPSULE, DELAYED RELEASE ORAL at 08:40

## 2024-12-21 RX ADMIN — VALSARTAN 40 MG: 80 TABLET, FILM COATED ORAL at 08:40

## 2024-12-21 RX ADMIN — ATORVASTATIN CALCIUM 40 MG: 40 TABLET, FILM COATED ORAL at 08:39

## 2024-12-21 RX ADMIN — PANTOPRAZOLE SODIUM 40 MG: 40 TABLET, DELAYED RELEASE ORAL at 05:00

## 2024-12-21 RX ADMIN — APIXABAN 5 MG: 5 TABLET, FILM COATED ORAL at 20:33

## 2024-12-21 RX ADMIN — CLONAZEPAM 1 MG: 0.5 TABLET ORAL at 10:58

## 2024-12-21 RX ADMIN — BENZONATATE 100 MG: 100 CAPSULE ORAL at 08:40

## 2024-12-21 RX ADMIN — METHYLPREDNISOLONE SODIUM SUCCINATE 40 MG: 40 INJECTION, POWDER, FOR SOLUTION INTRAMUSCULAR; INTRAVENOUS at 20:33

## 2024-12-21 RX ADMIN — SENNOSIDES AND DOCUSATE SODIUM 2 TABLET: 50; 8.6 TABLET ORAL at 08:39

## 2024-12-21 RX ADMIN — CETIRIZINE HYDROCHLORIDE 10 MG: 10 TABLET, FILM COATED ORAL at 08:40

## 2024-12-21 RX ADMIN — METHYLPREDNISOLONE SODIUM SUCCINATE 40 MG: 40 INJECTION, POWDER, FOR SOLUTION INTRAMUSCULAR; INTRAVENOUS at 04:57

## 2024-12-21 RX ADMIN — Medication 10 ML: at 04:57

## 2024-12-21 RX ADMIN — METHYLPREDNISOLONE SODIUM SUCCINATE 40 MG: 40 INJECTION, POWDER, FOR SOLUTION INTRAMUSCULAR; INTRAVENOUS at 13:06

## 2024-12-21 RX ADMIN — SENNOSIDES AND DOCUSATE SODIUM 2 TABLET: 50; 8.6 TABLET ORAL at 20:33

## 2024-12-21 RX ADMIN — Medication 10 ML: at 20:33

## 2024-12-21 RX ADMIN — AMIODARONE HYDROCHLORIDE 200 MG: 200 TABLET ORAL at 08:39

## 2024-12-21 RX ADMIN — GUAIFENESIN 600 MG: 600 TABLET, EXTENDED RELEASE ORAL at 20:33

## 2024-12-21 RX ADMIN — DULOXETINE HYDROCHLORIDE 60 MG: 30 CAPSULE, DELAYED RELEASE ORAL at 20:33

## 2024-12-21 RX ADMIN — Medication 10 ML: at 08:41

## 2024-12-21 RX ADMIN — APIXABAN 5 MG: 5 TABLET, FILM COATED ORAL at 08:39

## 2024-12-21 RX ADMIN — GUAIFENESIN 600 MG: 600 TABLET, EXTENDED RELEASE ORAL at 08:39

## 2024-12-21 NOTE — PLAN OF CARE
Goal Outcome Evaluation:           Progress: improving  Outcome Evaluation: Oriented x4.  VSS on 3L O2 via nasal cannula.  SR noted on telemetry.  Intake and output monitored.  Up to bedside commode; tolerated well.  No complaints or acute events noted during shift. Continue with plan of care.

## 2024-12-21 NOTE — PROGRESS NOTES
[facility]    Name:  Gabi Dudley   Age:  79 y.o.  Sex:  female  :  1945  MRN:  [id number]   Visit Number:  [id number]  Admission Date:  2024  Date Of Service:  24  Primary Care Physician:  Krystina Saunders DO     LOS: 1 day :    Chief Complaint:      Generalized weakness and shortness of breath.    Subjective:    Gabi Dudley was seen and examined this morning.  She is currently lying down in the bed and is comfortable at rest but still has shortness of breath.  She is on 3 L of nasal cannula oxygen and saturating in the mid 90s.  She states that she has been able to get to the bedside commode but does get short of breath on minimal exertion.  No fevers.  Denies any chest pain.    Hospital Course:    Gabi Dudley is a 79-year-old female with history of COPD on home oxygen at 4 L, hypertension, paroxysmal atrial fibrillation on apixaban, chronic back pain, GERD, generalized anxiety disorder, CAD, chronic anemia was brought to the emergency room by EMS with symptoms of shortness of breath.  Patient states that she has a CPAP machine at home but unfortunately has not been compliant.  She states that she quit smoking several years ago.  She lives with her daughter who apparently smokes.  Denies any fevers but does complain of generalized weakness and cough.     In the emergency room, she was afebrile but tachypneic at 32 with initial blood pressure 193/95 and pulse oxygen saturation of 97% on 4 L of nasal cannula oxygen.  Venous blood gas showed pH of 7.31, pCO2 87, pO2 17 and bicarb of 44.  Initial troponin 15 with repeat at 12.  proBNP 1444.  CMP was fairly unremarkable except for a CO2 of 34, alkaline phosphatase 165, AST 56, ALT 49.  CBC was unremarkable except for hemoglobin of 9.7 which seems to be her baseline.  COVID and flu test were negative.  Chest x-ray showed chronic bronchovascular markings.  EKG was negative for any acute  changes.  Patient was given aspirin, dexamethasone, bronchodilator nebulizations in the emergency room.  She was placed on BiPAP therapy and due to poor tolerance, she was given Valium and oxycodone.  She was subsequently admitted to the medical floor with telemetry for management of acute COPD exacerbation, acute on chronic hypoxic and hypercapnic respiratory failure.    Review of Systems:     All systems were reviewed and negative except as mentioned in subjective, assessment and plan.    Vital Signs:    Temp:  [97.8 °F (36.6 °C)-98.7 °F (37.1 °C)] 97.8 °F (36.6 °C)  Heart Rate:  [69-81] 70  Resp:  [16-20] 20  BP: (121-172)/() 126/58    Intake and output:    I/O last 3 completed shifts:  In: 60 [P.O.:60]  Out: -   I/O this shift:  In: 240 [P.O.:240]  Out: -     Physical Examination:    General Appearance:  Alert and cooperative.    Head:  Atraumatic and normocephalic.   Eyes: Conjunctivae and sclerae normal, no icterus. No pallor.   Throat: No oral lesions, no thrush, oral mucosa moist.   Neck: Supple, trachea midline, no thyromegaly.   Lungs:   Breath sounds heard bilaterally equally.  No crackles.  Scattered wheezing heard.  No Pleural rub or bronchial breathing.   Heart:  Normal S1 and S2, no murmur, no gallop, no rub. No JVD.   Abdomen:   Normal bowel sounds, no masses, no organomegaly. Soft, nontender, nondistended, no rebound tenderness.   Extremities: Supple, no edema, no cyanosis, no clubbing.   Skin: No bleeding or rash.   Neurologic: Alert and oriented x 3. No facial asymmetry. Moves all four limbs. No tremors.    Laboratory results:    Results from last 7 days   Lab Units 12/21/24  0548 12/20/24  0337   SODIUM mmol/L 141 137   POTASSIUM mmol/L 4.2 4.7   CHLORIDE mmol/L 98 96*   CO2 mmol/L 34.8* 33.7*   BUN mg/dL 22 18   CREATININE mg/dL 0.48* 0.55*   CALCIUM mg/dL 9.6 9.2   BILIRUBIN mg/dL  --  <0.2   ALK PHOS U/L  --  165*   ALT (SGPT) U/L  --  49*   AST (SGOT) U/L  --  56*   GLUCOSE mg/dL 133*  117*     Results from last 7 days   Lab Units 12/21/24  0548 12/20/24  0337   WBC 10*3/mm3 5.45 5.18   HEMOGLOBIN g/dL 9.8* 9.7*   HEMATOCRIT % 32.7* 33.5*   PLATELETS 10*3/mm3 186 172       Results from last 7 days   Lab Units 12/20/24  0506 12/20/24  0337   HSTROP T ng/L 12 15*     I have reviewed the patient's laboratory results.    Radiology results:    XR Chest 1 View    Result Date: 12/20/2024   PROCEDURE: XR CHEST 1 VW-  HISTORY: Chest pain difficulty breathing  COMPARISON: August 15, 2023..  FINDINGS: The heart is mildly enlarged, similar to prior. Medial apices not visualized on this exam secondary to patient's head position.. The mediastinum is unremarkable. Decreased inspiratory effort noted with crowding of the lung markings in both lung bases. No acute infiltrate noted. There is mild, bilateral interstitial disease, similar to prior. Patient is rotated to the right.. There is no pneumothorax.  There are no acute osseous abnormalities.      Impression: Cardiomegaly and bilateral interstitial disease, similar to prior..  .    This report was signed and finalized on 12/20/2024 7:48 AM by Angeles Collins MD.     I have reviewed the patient's radiology reports.    Medication Review:     I have reviewed the patient's active and prn medications.     Problem List:      COPD exacerbation    Essential hypertension    Paroxysmal atrial fibrillation    Acute on chronic respiratory failure with hypoxia and hypercapnia    Assessment:    Acute on chronic hypoxic and hypercapnic respiratory failure, POA.  Acute COPD exacerbation, POA.  Essential hypertension, uncontrolled, POA.  Paroxysmal atrial fibrillation on apixaban.  Mild transaminitis, of uncertain etiology.  Depression and anxiety.  Chronic anemia.    Plan:    espiratory failure/COPD exacerbation.  - Continue nasal cannula oxygen and intermittent BiPAP therapy.  - Bronchodilators, budesonide, IV Solu-Medrol.  - COVID and flu test were negative.     Paroxysmal  atrial fibrillation.  - Continue amiodarone, apixaban, Toprol-XL.     Essential hypertension.  - Continue Toprol-XL, valsartan.    Mild transaminitis.  - Exact etiology of this is uncertain but could be related to statin therapy.  - Previous imaging did not show any evidence of fatty liver disease.  - Outpatient follow-up with PCP.    Discussed with nursing staff.    I have reviewed the copied text and it is accurate as of 12/21/24    DVT Prophylaxis: Apixaban  Code Status: Full  Diet: Cardiac  Discharge Plan: Hopefully, home in 2 to 3 days.    Lenny Dewitt MD  12/21/24  13:12 EST    Dictated utilizing Dragon dictation.

## 2024-12-21 NOTE — THERAPY EVALUATION
Will hold PT evaluation at this time due to pt fatigue as well as pt in process of being moved to  209. Will plan to check back with pt tomorrow.

## 2024-12-22 LAB
A-A DO2: ABNORMAL
ALBUMIN SERPL-MCNC: 3.6 G/DL (ref 3.5–5.2)
ALBUMIN/GLOB SERPL: 1.3 G/DL
ALP SERPL-CCNC: 127 U/L (ref 39–117)
ALT SERPL W P-5'-P-CCNC: 51 U/L (ref 1–33)
ANION GAP SERPL CALCULATED.3IONS-SCNC: 5.4 MMOL/L (ref 5–15)
ARTERIAL PATENCY WRIST A: ABNORMAL
AST SERPL-CCNC: 45 U/L (ref 1–32)
ATMOSPHERIC PRESS: 742 MMHG
BASE EXCESS BLDA CALC-SCNC: 11.9 MMOL/L (ref 0–2)
BDY SITE: ABNORMAL
BILIRUB SERPL-MCNC: 0.2 MG/DL (ref 0–1.2)
BUN SERPL-MCNC: 34 MG/DL (ref 8–23)
BUN/CREAT SERPL: 59.6 (ref 7–25)
CALCIUM SPEC-SCNC: 9.2 MG/DL (ref 8.6–10.5)
CHLORIDE SERPL-SCNC: 100 MMOL/L (ref 98–107)
CO2 SERPL-SCNC: 36.6 MMOL/L (ref 22–29)
COHGB MFR BLD: 0.8 % (ref 0–2)
CREAT SERPL-MCNC: 0.57 MG/DL (ref 0.57–1)
EGFRCR SERPLBLD CKD-EPI 2021: 93.7 ML/MIN/1.73
GAS FLOW AIRWAY: 5 LPM
GLOBULIN UR ELPH-MCNC: 2.7 GM/DL
GLUCOSE SERPL-MCNC: 97 MG/DL (ref 65–99)
HCO3 BLDA-SCNC: 40.9 MMOL/L (ref 22–28)
HCT VFR BLD CALC: 32.8 %
Lab: ABNORMAL
Lab: ABNORMAL
METHGB BLD QL: 1 % (ref 0–1.5)
MODALITY: ABNORMAL
NOTIFIED BY: ABNORMAL
NOTIFIED WHO: ABNORMAL
OXYHGB MFR BLDV: 97.3 % (ref 94–99)
PCO2 BLDA: 81.2 MM HG (ref 35–45)
PCO2 TEMP ADJ BLD: ABNORMAL MM[HG]
PH BLDA: 7.31 PH UNITS (ref 7.3–7.5)
PH, TEMP CORRECTED: ABNORMAL
PO2 BLDA: 128 MM HG (ref 75–100)
PO2 TEMP ADJ BLD: ABNORMAL MM[HG]
POTASSIUM SERPL-SCNC: 4 MMOL/L (ref 3.5–5.2)
PROT SERPL-MCNC: 6.3 G/DL (ref 6–8.5)
SAO2 % BLDCOA: 99.1 % (ref 94–100)
SODIUM SERPL-SCNC: 142 MMOL/L (ref 136–145)
VENTILATOR MODE: ABNORMAL

## 2024-12-22 PROCEDURE — 83050 HGB METHEMOGLOBIN QUAN: CPT

## 2024-12-22 PROCEDURE — 94799 UNLISTED PULMONARY SVC/PX: CPT

## 2024-12-22 PROCEDURE — 80053 COMPREHEN METABOLIC PANEL: CPT | Performed by: INTERNAL MEDICINE

## 2024-12-22 PROCEDURE — 99232 SBSQ HOSP IP/OBS MODERATE 35: CPT | Performed by: INTERNAL MEDICINE

## 2024-12-22 PROCEDURE — 25010000002 METHYLPREDNISOLONE PER 40 MG: Performed by: INTERNAL MEDICINE

## 2024-12-22 PROCEDURE — 82375 ASSAY CARBOXYHB QUANT: CPT

## 2024-12-22 PROCEDURE — 82805 BLOOD GASES W/O2 SATURATION: CPT

## 2024-12-22 PROCEDURE — 36600 WITHDRAWAL OF ARTERIAL BLOOD: CPT

## 2024-12-22 PROCEDURE — 94761 N-INVAS EAR/PLS OXIMETRY MLT: CPT

## 2024-12-22 RX ORDER — METHYLPREDNISOLONE SODIUM SUCCINATE 40 MG/ML
40 INJECTION, POWDER, LYOPHILIZED, FOR SOLUTION INTRAMUSCULAR; INTRAVENOUS EVERY 12 HOURS
Status: DISCONTINUED | OUTPATIENT
Start: 2024-12-23 | End: 2024-12-23 | Stop reason: HOSPADM

## 2024-12-22 RX ADMIN — AMIODARONE HYDROCHLORIDE 200 MG: 200 TABLET ORAL at 08:59

## 2024-12-22 RX ADMIN — APIXABAN 5 MG: 5 TABLET, FILM COATED ORAL at 08:59

## 2024-12-22 RX ADMIN — OXYCODONE HYDROCHLORIDE AND ACETAMINOPHEN 1 TABLET: 7.5; 325 TABLET ORAL at 20:17

## 2024-12-22 RX ADMIN — BUDESONIDE 0.5 MG: 0.5 SUSPENSION RESPIRATORY (INHALATION) at 19:40

## 2024-12-22 RX ADMIN — GUAIFENESIN 600 MG: 600 TABLET, EXTENDED RELEASE ORAL at 20:17

## 2024-12-22 RX ADMIN — LIDOCAINE 1 PATCH: 4 PATCH TOPICAL at 08:58

## 2024-12-22 RX ADMIN — TRAZODONE HYDROCHLORIDE 50 MG: 50 TABLET ORAL at 20:17

## 2024-12-22 RX ADMIN — SENNOSIDES AND DOCUSATE SODIUM 2 TABLET: 50; 8.6 TABLET ORAL at 08:58

## 2024-12-22 RX ADMIN — GUAIFENESIN 600 MG: 600 TABLET, EXTENDED RELEASE ORAL at 08:59

## 2024-12-22 RX ADMIN — PANTOPRAZOLE SODIUM 40 MG: 40 TABLET, DELAYED RELEASE ORAL at 05:00

## 2024-12-22 RX ADMIN — ATORVASTATIN CALCIUM 40 MG: 40 TABLET, FILM COATED ORAL at 08:59

## 2024-12-22 RX ADMIN — APIXABAN 5 MG: 5 TABLET, FILM COATED ORAL at 20:17

## 2024-12-22 RX ADMIN — DULOXETINE HYDROCHLORIDE 60 MG: 30 CAPSULE, DELAYED RELEASE ORAL at 20:17

## 2024-12-22 RX ADMIN — VALSARTAN 40 MG: 80 TABLET, FILM COATED ORAL at 08:58

## 2024-12-22 RX ADMIN — IPRATROPIUM BROMIDE AND ALBUTEROL SULFATE 3 ML: 2.5; .5 SOLUTION RESPIRATORY (INHALATION) at 19:40

## 2024-12-22 RX ADMIN — Medication 10 ML: at 09:00

## 2024-12-22 RX ADMIN — OXYCODONE HYDROCHLORIDE AND ACETAMINOPHEN 1 TABLET: 7.5; 325 TABLET ORAL at 06:27

## 2024-12-22 RX ADMIN — QUETIAPINE FUMARATE 12.5 MG: 25 TABLET ORAL at 20:18

## 2024-12-22 RX ADMIN — METHYLPREDNISOLONE SODIUM SUCCINATE 40 MG: 40 INJECTION, POWDER, FOR SOLUTION INTRAMUSCULAR; INTRAVENOUS at 04:58

## 2024-12-22 RX ADMIN — CLONAZEPAM 1 MG: 0.5 TABLET ORAL at 08:59

## 2024-12-22 RX ADMIN — CLONAZEPAM 1 MG: 0.5 TABLET ORAL at 20:17

## 2024-12-22 RX ADMIN — FLUTICASONE PROPIONATE 2 SPRAY: 50 SPRAY, METERED NASAL at 08:58

## 2024-12-22 RX ADMIN — CETIRIZINE HYDROCHLORIDE 10 MG: 10 TABLET, FILM COATED ORAL at 08:59

## 2024-12-22 RX ADMIN — METHYLPREDNISOLONE SODIUM SUCCINATE 40 MG: 40 INJECTION, POWDER, FOR SOLUTION INTRAMUSCULAR; INTRAVENOUS at 11:48

## 2024-12-22 RX ADMIN — DULOXETINE HYDROCHLORIDE 60 MG: 30 CAPSULE, DELAYED RELEASE ORAL at 08:59

## 2024-12-22 RX ADMIN — METOPROLOL SUCCINATE 25 MG: 25 TABLET, EXTENDED RELEASE ORAL at 08:59

## 2024-12-22 RX ADMIN — BENZONATATE 100 MG: 100 CAPSULE ORAL at 20:17

## 2024-12-22 RX ADMIN — Medication 10 ML: at 20:20

## 2024-12-22 NOTE — PLAN OF CARE
Goal Outcome Evaluation:      Interventions implemented as appropriate.

## 2024-12-22 NOTE — PROGRESS NOTES
RT EQUIPMENT DEVICE RELATED - SKIN ASSESSMENT    Jed Score:  Jed Score: 19     RT Medical Equipment/Device:     NIV Mask:  Under-the-nose   size:  b    Skin Assessment:      Nose:  Intact    Device Skin Pressure Protection:  Skin-to-device areas padded:  None Required    Nurse Notification:  Cora Celeste, RRT

## 2024-12-22 NOTE — THERAPY EVALUATION
Attempted PT evaluation this am. Pt presented on L side in bed. Pt looked at therapist when therapist identified herself and explained purpose for visit. Pt shook her head no, closed her eyes, and covered her head partially. Therapist tried to identify reason that pt declined services but pt only nodding her head yes or no and did not speak to therapist. It appears that pt is too fatigued for evaluation and wished to wait until tomorrow. Nursing notified.

## 2024-12-22 NOTE — PROGRESS NOTES
[facility]    Name:  Gabi Dudley   Age:  79 y.o.  Sex:  female  :  1945  MRN:  [id number]   Visit Number:  [id number]  Admission Date:  2024  Date Of Service:  24  Primary Care Physician:  Krystina Saunders DO     LOS: 2 days :    Chief Complaint:      Generalized weakness and shortness of breath.    Subjective:    Gabi Dudley was seen and examined this morning.  Patient is currently lying down on the bed and is comfortable at rest.  She is still having some shortness of breath and generalized weakness.  Denies any chest pain.  She declined to work with physical therapy today.    Hospital Course:    Gabi Dudley is a 79-year-old female with history of COPD on home oxygen at 4 L, hypertension, paroxysmal atrial fibrillation on apixaban, chronic back pain, GERD, generalized anxiety disorder, CAD, chronic anemia was brought to the emergency room by EMS with symptoms of shortness of breath.  Patient states that she has a CPAP machine at home but unfortunately has not been compliant.  She states that she quit smoking several years ago.  She lives with her daughter who apparently smokes.  Denies any fevers but does complain of generalized weakness and cough.     In the emergency room, she was afebrile but tachypneic at 32 with initial blood pressure 193/95 and pulse oxygen saturation of 97% on 4 L of nasal cannula oxygen.  Venous blood gas showed pH of 7.31, pCO2 87, pO2 17 and bicarb of 44.  Initial troponin 15 with repeat at 12.  proBNP 1444.  CMP was fairly unremarkable except for a CO2 of 34, alkaline phosphatase 165, AST 56, ALT 49.  CBC was unremarkable except for hemoglobin of 9.7 which seems to be her baseline.  COVID and flu test were negative.  Chest x-ray showed chronic bronchovascular markings.  EKG was negative for any acute changes.  Patient was given aspirin, dexamethasone, bronchodilator nebulizations in the emergency room.   She was placed on BiPAP therapy and due to poor tolerance, she was given Valium and oxycodone.  She was subsequently admitted to the medical floor with telemetry for management of acute COPD exacerbation, acute on chronic hypoxic and hypercapnic respiratory failure.    Review of Systems:     All systems were reviewed and negative except as mentioned in subjective, assessment and plan.    Vital Signs:    Temp:  [97.4 °F (36.3 °C)-98.6 °F (37 °C)] 98.6 °F (37 °C)  Heart Rate:  [67-69] 67  Resp:  [16] 16  BP: (102-156)/(56-69) 102/56    Intake and output:    I/O last 3 completed shifts:  In: 630 [P.O.:630]  Out: 250 [Urine:250]  I/O this shift:  In: 120 [P.O.:120]  Out: -     Physical Examination:    General Appearance:  Alert and cooperative.    Head:  Atraumatic and normocephalic.   Eyes: Conjunctivae and sclerae normal, no icterus. No pallor.   Throat: No oral lesions, no thrush, oral mucosa moist.   Neck: Supple, trachea midline, no thyromegaly.   Lungs:   Breath sounds heard bilaterally equally.  No crackles.  Scattered wheezing heard.  No Pleural rub or bronchial breathing.   Heart:  Normal S1 and S2, no murmur, no gallop, no rub. No JVD.   Abdomen:   Normal bowel sounds, no masses, no organomegaly. Soft, nontender, nondistended, no rebound tenderness.   Extremities: Supple, no edema, no cyanosis, no clubbing.   Skin: No bleeding or rash.   Neurologic: Alert and oriented x 3. No facial asymmetry. Moves all four limbs. No tremors.    Laboratory results:    Results from last 7 days   Lab Units 12/22/24  0559 12/21/24  0548 12/20/24  0337   SODIUM mmol/L 142 141 137   POTASSIUM mmol/L 4.0 4.2 4.7   CHLORIDE mmol/L 100 98 96*   CO2 mmol/L 36.6* 34.8* 33.7*   BUN mg/dL 34* 22 18   CREATININE mg/dL 0.57 0.48* 0.55*   CALCIUM mg/dL 9.2 9.6 9.2   BILIRUBIN mg/dL 0.2  --  <0.2   ALK PHOS U/L 127*  --  165*   ALT (SGPT) U/L 51*  --  49*   AST (SGOT) U/L 45*  --  56*   GLUCOSE mg/dL 97 133* 117*     Results from last 7 days    Lab Units 12/21/24  0548 12/20/24  0337   WBC 10*3/mm3 5.45 5.18   HEMOGLOBIN g/dL 9.8* 9.7*   HEMATOCRIT % 32.7* 33.5*   PLATELETS 10*3/mm3 186 172       Results from last 7 days   Lab Units 12/20/24  0506 12/20/24  0337   HSTROP T ng/L 12 15*     I have reviewed the patient's laboratory results.    Radiology results:    No radiology results from the last 24 hrs    Medication Review:     I have reviewed the patient's active and prn medications.     Problem List:      COPD exacerbation    Essential hypertension    Paroxysmal atrial fibrillation    Acute on chronic respiratory failure with hypoxia and hypercapnia    Assessment:    Acute on chronic hypoxic and hypercapnic respiratory failure, POA.  Acute COPD exacerbation, POA.  Essential hypertension, uncontrolled, POA.  Paroxysmal atrial fibrillation on apixaban.  Mild transaminitis, of uncertain etiology.  Depression and anxiety.  Chronic anemia.    Plan:    Respiratory failure/COPD exacerbation.  - Continue nasal cannula oxygen and intermittent BiPAP therapy.  - Bronchodilators, budesonide.  - Decrease IV Solu-Medrol every 12 hours.  - COVID and flu test were negative.  - Patient is slightly drowsy and tremulous, we will get an ABG to rule out hypercapnia.  - I strongly advised the patient to be compliant with BiPAP therapy.     Paroxysmal atrial fibrillation.  - Continue amiodarone, apixaban, Toprol-XL.     Essential hypertension.  - Continue Toprol-XL, valsartan.    Mild transaminitis.  - Exact etiology of this is uncertain but could be related to statin therapy.  - Previous imaging did not show any evidence of fatty liver disease.  - Outpatient follow-up with PCP.    I have strongly advised the patient to work with physical therapy.    Discussed with nursing staff.  I have discussed the patient's condition and treatment plan with her daughter Farida over the phone.  Patient does have oxygen as well as BiPAP at home but unfortunately has not been using the BiPAP  therapy.  Patient is agreeable for home health.    I have reviewed the copied text and it is accurate as of 12/22/24    DVT Prophylaxis: Apixaban  Code Status: Full  Diet: Cardiac  Discharge Plan: Home with home health in 1 to 2 days.    Lenny Dewitt MD  12/22/24  12:51 EST    Dictated utilizing Dragon dictation.

## 2024-12-22 NOTE — PLAN OF CARE
Goal Outcome Evaluation:           Progress: improving  Outcome Evaluation: VSS on 3L O2 via nasal cannula.  IV Solu-Medrol administered per order (see MAR).  Intake and output monitored.  Up to bedside commode; tolerated well.  No complaints or acute events noted during shift.  Continue with plan of care.

## 2024-12-23 ENCOUNTER — READMISSION MANAGEMENT (OUTPATIENT)
Dept: CALL CENTER | Facility: HOSPITAL | Age: 79
End: 2024-12-23
Payer: MEDICARE

## 2024-12-23 VITALS
BODY MASS INDEX: 32.4 KG/M2 | OXYGEN SATURATION: 100 % | HEART RATE: 72 BPM | WEIGHT: 139.99 LBS | TEMPERATURE: 97.5 F | HEIGHT: 55 IN | RESPIRATION RATE: 16 BRPM | DIASTOLIC BLOOD PRESSURE: 60 MMHG | SYSTOLIC BLOOD PRESSURE: 140 MMHG

## 2024-12-23 LAB
A-A DO2: 65.2 MMHG
ARTERIAL PATENCY WRIST A: POSITIVE
ATMOSPHERIC PRESS: 742 MMHG
BASE EXCESS BLDA CALC-SCNC: 11.7 MMOL/L (ref 0–2)
BDY SITE: ABNORMAL
COHGB MFR BLD: 0.8 % (ref 0–2)
GAS FLOW AIRWAY: 3 LPM
HCO3 BLDA-SCNC: 39.1 MMOL/L (ref 22–28)
HCT VFR BLD CALC: 31.6 %
INHALED O2 CONCENTRATION: 32 %
Lab: ABNORMAL
Lab: ABNORMAL
METHGB BLD QL: 0.8 % (ref 0–1.5)
MODALITY: ABNORMAL
NOTIFIED BY: ABNORMAL
NOTIFIED WHO: ABNORMAL
OXYHGB MFR BLDV: 95.1 % (ref 94–99)
PCO2 BLDA: 67.1 MM HG (ref 35–45)
PCO2 TEMP ADJ BLD: ABNORMAL MM[HG]
PH BLDA: 7.37 PH UNITS (ref 7.3–7.5)
PH, TEMP CORRECTED: ABNORMAL
PO2 BLDA: 82.6 MM HG (ref 75–100)
PO2 TEMP ADJ BLD: ABNORMAL MM[HG]
SAO2 % BLDCOA: 96.6 % (ref 94–100)
VENTILATOR MODE: ABNORMAL

## 2024-12-23 PROCEDURE — 94761 N-INVAS EAR/PLS OXIMETRY MLT: CPT

## 2024-12-23 PROCEDURE — 94799 UNLISTED PULMONARY SVC/PX: CPT

## 2024-12-23 PROCEDURE — 25010000002 METHYLPREDNISOLONE PER 40 MG: Performed by: INTERNAL MEDICINE

## 2024-12-23 PROCEDURE — 94660 CPAP INITIATION&MGMT: CPT

## 2024-12-23 PROCEDURE — 82805 BLOOD GASES W/O2 SATURATION: CPT

## 2024-12-23 PROCEDURE — 83050 HGB METHEMOGLOBIN QUAN: CPT

## 2024-12-23 PROCEDURE — 99239 HOSP IP/OBS DSCHRG MGMT >30: CPT | Performed by: INTERNAL MEDICINE

## 2024-12-23 PROCEDURE — 94664 DEMO&/EVAL PT USE INHALER: CPT

## 2024-12-23 PROCEDURE — 82375 ASSAY CARBOXYHB QUANT: CPT

## 2024-12-23 PROCEDURE — 36600 WITHDRAWAL OF ARTERIAL BLOOD: CPT

## 2024-12-23 RX ORDER — PREDNISONE 10 MG/1
TABLET ORAL
Qty: 18 TABLET | Refills: 0 | Status: SHIPPED | OUTPATIENT
Start: 2024-12-23 | End: 2025-01-01

## 2024-12-23 RX ADMIN — Medication 10 ML: at 08:57

## 2024-12-23 RX ADMIN — PANTOPRAZOLE SODIUM 40 MG: 40 TABLET, DELAYED RELEASE ORAL at 05:38

## 2024-12-23 RX ADMIN — LIDOCAINE 1 PATCH: 4 PATCH TOPICAL at 08:57

## 2024-12-23 RX ADMIN — APIXABAN 5 MG: 5 TABLET, FILM COATED ORAL at 08:56

## 2024-12-23 RX ADMIN — BUDESONIDE 0.5 MG: 0.5 SUSPENSION RESPIRATORY (INHALATION) at 06:53

## 2024-12-23 RX ADMIN — METOPROLOL SUCCINATE 25 MG: 25 TABLET, EXTENDED RELEASE ORAL at 08:56

## 2024-12-23 RX ADMIN — CETIRIZINE HYDROCHLORIDE 10 MG: 10 TABLET, FILM COATED ORAL at 08:56

## 2024-12-23 RX ADMIN — AMIODARONE HYDROCHLORIDE 200 MG: 200 TABLET ORAL at 08:56

## 2024-12-23 RX ADMIN — ATORVASTATIN CALCIUM 40 MG: 40 TABLET, FILM COATED ORAL at 08:56

## 2024-12-23 RX ADMIN — GUAIFENESIN 600 MG: 600 TABLET, EXTENDED RELEASE ORAL at 08:56

## 2024-12-23 RX ADMIN — IPRATROPIUM BROMIDE AND ALBUTEROL SULFATE 3 ML: 2.5; .5 SOLUTION RESPIRATORY (INHALATION) at 06:53

## 2024-12-23 RX ADMIN — VALSARTAN 40 MG: 80 TABLET, FILM COATED ORAL at 08:56

## 2024-12-23 RX ADMIN — METHYLPREDNISOLONE SODIUM SUCCINATE 40 MG: 40 INJECTION, POWDER, FOR SOLUTION INTRAMUSCULAR; INTRAVENOUS at 00:24

## 2024-12-23 RX ADMIN — DULOXETINE HYDROCHLORIDE 60 MG: 30 CAPSULE, DELAYED RELEASE ORAL at 08:56

## 2024-12-23 NOTE — DISCHARGE PLACEMENT REQUEST
"Gabi Eric (79 y.o. Female)       Date of Birth   1945    Social Security Number       Address   [address] [place] [address]    Home Phone   [phone]    MRN   [id number]       Spiritism   Pentec[employer]al    Marital Status                               Admission Date   12/20/24    Admission Type   Emergency    Admitting Provider   Lenny Dewitt MD    Attending Provider   Lenny Dewitt MD    Department, Room/Bed   [facility] TELEMETRY 3, 328/1       Discharge Date       Discharge Disposition   Home-Health Care [facility]    Discharge Destination                                 Attending Provider: Lenny Dewitt MD    Allergies: Ativan [Lorazepam], Doxycycline    Isolation: None   Infection: None   Code Status: CPR    Ht: 65 cm (25.59\")   Wt: 63.5 kg (139 lb 15.9 oz)    Admission Cmt: None   Principal Problem: COPD exacerbation [J44.1]                   Active Insurance as of 12/20/2024       Primary Coverage       Payor Plan Insurance Group Employer/Plan Group    MEDICARE MEDICARE A & B        Payor Plan Address Payor Plan Phone Number Payor Plan Fax Number Effective Dates    [address] [phone]  2/1/2012 - None Entered    [facility] [id number]         Subscriber Name Subscriber Birth Date Member ID       GABI ERIC 1945 [id number]               Secondary Coverage       Payor Plan Insurance Group Employer/Plan Group    AARP MC SUP A[employer] HEALTH CARE OPTIONS        Payor Plan Address Payor Plan Phone Number Payor Plan Fax Number Effective Dates    [facility] [phone]  1/1/2016 - None Entered    [address]       [facility] [address]         Subscriber Name Subscriber Birth Date Member ID       GABI ERIC 1945 [id number]                     Emergency Contacts        (Rel.) Home Phone Work Phone Mobile Phone    Malu Aguilar (Daughter) [phone] -- [phone]              Referral Orders (last 24 hours) (24h ago, onward)       Start    "  Ordered    12/23/24 0000  Ambulatory Referral to Home Health (Hospital)        Question Answer Comment   Face to Face Visit Date: 12/23/2024    Follow-up provider for Plan of Care? I treated the patient in an acute care facility and will not continue treatment after discharge.    Follow-up provider: JACKSON RUBIO    Reason/Clinical Findings COPD, respiratory failure, hypercapnia, atrial fibrillation, hypertension.    Describe mobility limitations that make leaving home difficult: Generalized weakness, respiratory failure    Nursing/Therapeutic Services Requested Skilled Nursing    Nursing/Therapeutic Services Requested Physical Therapy    Nursing/Therapeutic Services Requested Occupational Therapy    Skilled nursing orders: Medication education BiPAP compliance   Skilled nursing orders: O2 instruction    Skilled nursing orders: Mini-nebs    Skilled nursing orders: COPD management    Skilled nursing orders: Cardiopulmonary assessments    Skilled nursing orders: Other    PT orders: Therapeutic exercise    PT orders: Gait Training    PT orders: Transfer training    PT orders: Strengthening    Weight Bearing Status As Tolerated    Occupational orders: Activities of daily living    Occupational orders: Energy conservation    Occupational orders: Strengthening    Occupational orders: Fine motor    Frequency: 1 Week 1        12/23/24 1027    12/23/24 0000  Ambulatory Referral to Disease State Management        Question Answer Comment   To dept:  [facility]    What program(s) are you referring for? COPD    Follow-up needed: Yes        12/23/24 1037

## 2024-12-23 NOTE — CASE MANAGEMENT/SOCIAL WORK
Spoke  to pt regarding Home Health She does not have a preference  Confirmed this with pt Daughter  Referral sent to [facility]   [facility] due to staffing Estrella is able to accept start of care 12/27

## 2024-12-23 NOTE — DISCHARGE PLACEMENT REQUEST
"  Pamela Dubon RN  [phone]   Aixa Dudley (79 y.o. Female)       Date of Birth   1945    Social Security Number       Address   [address] [place] [address]    Home Phone   [phone]    MRN   [id number]       Orthodoxy   Jain    Marital Status                               Admission Date   12/20/24    Admission Type   Emergency    Admitting Provider   Lenny Dewitt MD    Attending Provider   Lenny Dewitt MD    Department, Room/Bed   [facility] TELEMETRY 3, 328/1       Discharge Date       [place] [facility] [place]                                 Attending Provider: Lenny Dewitt MD    Allergies: Ativan [Lorazepam], Doxycycline    Isolation: None   Infection: None   Code Status: CPR    Ht: 65 cm (25.59\")   Wt: 63.5 kg (139 lb 15.9 oz)    Admission Cmt: None   Principal Problem: COPD exacerbation [J44.1]                   Active Insurance as of 12/20/2024       Primary Coverage       Payor Plan Insurance Group Employer/Plan Group    MEDICARE MEDICARE A & B        Payor Plan Address Payor Plan Phone Number Payor Plan Fax Number Effective Dates    [address] [phone]  2/1/2012 - None Entered    [facility], [facility] [address]         Subscriber Name Subscriber Birth Date Member ID       AIXA DUDLEY 1945 [id number]               Secondary Coverage       Payor Plan Insurance Group Employer/Plan Group    [facility]        Payor Plan Address Payor Plan Phone Number Payor Plan Fax Number Effective Dates    [facility] [phone]  1/1/2016 - None Entered    [address]       [facility] [address]         Subscriber Name Subscriber Birth Date Member ID       AIXA DUDLEY 1945 [id number]                     Emergency Contacts        (Rel.) Home Phone Work Phone Mobile Phone    Malu Aguilar (Daughter) [phone] -- [phone]                 Discharge Summary    "     Lenny Dewitt MD at 24 1030              [facility]   DISCHARGE SUMMARY      Name:  Gabi Dudley   Age:  79 y.o.  Sex:  female  :  1945  MRN:  [id number]   Visit Number:  [id number]    Admission Date:  2024  Date of Discharge:  2024  Primary Care Physician:  Krystina Saunders,     Important issues to note:    1.  Patient was admitted with acute COPD exacerbation and hypercapnia and was treated with bronchodilators, IV Solu-Medrol as well as BiPAP therapy.  Patient has been noncompliant with her home BiPAP therapy and has been strongly advised to be compliant with BiPAP.  2.  Patient will be discharged home with home health with prednisone taper.  3.  Follow-up with COPD discharge clinic.  4.  Patient does have chronic systolic heart failure and will follow-up with Dr. Curry as scheduled on 2024.  5.  Patient does live with her daughter and will be arranged home health services.  She already has home oxygen.    Discharge Diagnoses:     Acute on chronic hypoxic and hypercapnic respiratory failure, POA.  Acute COPD exacerbation, POA.  Essential hypertension, uncontrolled, POA.  Paroxysmal atrial fibrillation on apixaban.  Chronic systolic heart failure (likely secondary to hypertensive heart disease), no exacerbation.  Mild transaminitis, of uncertain etiology.  Depression and anxiety.  Chronic anemia.    Problem List:     Active Hospital Problems    Diagnosis  POA    **COPD exacerbation [J44.1]  Yes    Acute on chronic respiratory failure with hypoxia and hypercapnia [J96.21, J96.22]  Yes    Paroxysmal atrial fibrillation [I48.0]  Yes    Essential hypertension [I10]  Yes      Resolved Hospital Problems   No resolved problems to display.     Presenting Problem:    Chief Complaint   Patient presents with    Shortness of Breath      Consults:     Consulting Physician(s)                     None              Procedures Performed:    None.    History of  presenting illness/Hospital Course:    Gabi Dudley is a 79-year-old female with history of COPD on home oxygen at 4 L, chronic systolic heart failure, hypertension, paroxysmal atrial fibrillation on apixaban, chronic back pain, GERD, generalized anxiety disorder, CAD, chronic anemia was brought to the emergency room by EMS with symptoms of shortness of breath.  Patient states that she has a CPAP machine at home but unfortunately has not been compliant.  She states that she quit smoking several years ago.  She lives with her daughter who apparently smokes.  Denies any fevers but does complain of generalized weakness and cough.     In the emergency room, she was afebrile but tachypneic at 32 with initial blood pressure 193/95 and pulse oxygen saturation of 97% on 4 L of nasal cannula oxygen.  Venous blood gas showed pH of 7.31, pCO2 87, pO2 17 and bicarb of 44.  Initial troponin 15 with repeat at 12.  proBNP 1444.  CMP was fairly unremarkable except for a CO2 of 34, alkaline phosphatase 165, AST 56, ALT 49.  CBC was unremarkable except for hemoglobin of 9.7 which seems to be her baseline.  COVID and flu test were negative.  Chest x-ray showed chronic bronchovascular markings.  EKG was negative for any acute changes.  Patient was given aspirin, dexamethasone, bronchodilator nebulizations in the emergency room.  She was placed on BiPAP therapy and due to poor tolerance, she was given Valium and oxycodone.  She was subsequently admitted to the medical floor with telemetry for management of acute COPD exacerbation, acute on chronic hypoxic and hypercapnic respiratory failure.    Patient slowly improved with treatment with budesonide, IV Solu-Medrol.  Procalcitonin levels are within normal range.  She was placed on BiPAP therapy with improvement in her CO2 levels.  Due to noncompliance with BiPAP, he is at high risk for readmission.  Patient states that she has quit smoking.  She also has chronic systolic heart failure  and needs to follow-up with Dr. Curry as scheduled.  Patient will be arranged home health services.  Follow-up with primary care provider in 1 week.    Respiratory failure/COPD exacerbation.  - Continue nasal cannula oxygen and intermittent BiPAP therapy.  - Bronchodilators, budesonide.  - Patient was treated with IV Solu-Medrol and will be discharged on prednisone taper.  - COVID and flu test were negative.  - Patient does have chronic hypercapnia possibly with a baseline CO2 of 60 and has been strongly advised to be compliant with her home BiPAP therapy.     Paroxysmal atrial fibrillation.  - Continue amiodarone, apixaban, Toprol-XL.    Chronic systolic heart failure.  - 2D echocardiogram from July 2024 showed a left ventricular ejection fraction of 30 to 35%.  - Likely due to combination of hypertensive heart disease and tachycardia induced cardiomyopathy.  - Continue Toprol-XL, valsartan.  - Refused SGLT2 secondary to frequent UTIs.  - We will hold off on spironolactone due to upper limit of normal potassium levels.  - Patient follows up with Dr. Curry.     Essential hypertension.  - Continue Toprol-XL, valsartan.     Mild transaminitis.  - Exact etiology of this is uncertain but could be related to statin therapy.  - Previous imaging did not show any evidence of fatty liver disease.  - Outpatient follow-up with PCP.    Vital Signs:    Temp:  [97.5 °F (36.4 °C)-98.6 °F (37 °C)] 97.5 °F (36.4 °C)  Heart Rate:  [64-72] 72  Resp:  [16-18] 16  BP: (102-142)/(55-75) 140/60    Physical Exam:    General Appearance:  Alert and cooperative.  Elderly frail female.  Chronic dyspnea at rest.   Head:  Atraumatic and normocephalic.   Eyes: Conjunctivae and sclerae normal, no icterus.  Looks pale.   Ears:  Ears with no abnormalities noted.   Throat: No oral lesions, no thrush, oral mucosa moist.   Neck: Supple, trachea midline, no thyromegaly.   Back:   No kyphoscoliosis present. No tenderness to palpation.   Lungs:   Breath  sounds heard bilaterally equally.  No crackles.  Occasional scattered wheezing. No Pleural rub or bronchial breathing.   Heart:  Normal S1 and S2, no murmur, no gallop, no rub. No JVD.   Abdomen:   Normal bowel sounds, no masses, no organomegaly. Soft, nontender, nondistended, no rebound tenderness.   Extremities: Supple, no edema, no cyanosis, no clubbing.   Pulses: Pulses palpable bilaterally.   Skin: No bleeding or rash.   Neurologic: Alert and oriented x 3. No facial asymmetry. Moves all four limbs but does have generalized weakness. No tremors.     Pertinent Lab Results:     Results from last 7 days   Lab Units 12/22/24  0559 12/21/24  0548 12/20/24  0337   SODIUM mmol/L 142 141 137   POTASSIUM mmol/L 4.0 4.2 4.7   CHLORIDE mmol/L 100 98 96*   CO2 mmol/L 36.6* 34.8* 33.7*   BUN mg/dL 34* 22 18   CREATININE mg/dL 0.57 0.48* 0.55*   CALCIUM mg/dL 9.2 9.6 9.2   BILIRUBIN mg/dL 0.2  --  <0.2   ALK PHOS U/L 127*  --  165*   ALT (SGPT) U/L 51*  --  49*   AST (SGOT) U/L 45*  --  56*   GLUCOSE mg/dL 97 133* 117*     Results from last 7 days   Lab Units 12/21/24  0548 12/20/24  0337   WBC 10*3/mm3 5.45 5.18   HEMOGLOBIN g/dL 9.8* 9.7*   HEMATOCRIT % 32.7* 33.5*   PLATELETS 10*3/mm3 186 172       Results from last 7 days   Lab Units 12/20/24  0506 12/20/24  0337   HSTROP T ng/L 12 15*     Results from last 7 days   Lab Units 12/20/24  0337   PROBNP pg/mL 1,444.0       Results from last 7 days   Lab Units 12/23/24  0839   PH, ARTERIAL pH units 7.373   PO2 ART mm Hg 82.6   PCO2, ARTERIAL mm Hg 67.1*   HCO3 ART mmol/L 39.1*     Pertinent Radiology Results:    Imaging Results (All)       Procedure Component Value Units Date/Time    XR Chest 1 View [[id number]] Collected: 12/20/24 0746     Updated: 12/20/24 0750    Narrative:       PROCEDURE: XR CHEST 1 VW-     HISTORY: Chest pain difficulty breathing     COMPARISON: August 15, 2023..     FINDINGS: The heart is mildly enlarged, similar to prior. Medial apices  not visualized  on this exam secondary to patient's head position.. The  mediastinum is unremarkable. Decreased inspiratory effort noted with  crowding of the lung markings in both lung bases. No acute infiltrate  noted. There is mild, bilateral interstitial disease, similar to prior.  Patient is rotated to the right.. There is no pneumothorax.  There are  no acute osseous abnormalities.       Impression:      Cardiomegaly and bilateral interstitial disease, similar to  prior..     This report was signed and finalized on 12/20/2024 7:48 AM by Angeles Collins MD.        Echo:    Results for orders placed during the hospital encounter of 07/22/24    Adult Transthoracic Echo Limited W/ Cont if Necessary Per Protocol    Interpretation Summary    Left ventricular systolic function is mildly to moderately decreased. Left ventricular ejection fraction appears to be 36 - 40%.    Regional wall motion abnormalities as below.    No left ventricular thrombus identified by contrasted study.    Condition on Discharge:      Stable.    Code status during the hospital stay:    Code Status and Medical Interventions: CPR (Attempt to Resuscitate); Full Support   Ordered at: 12/20/24 1439     Code Status (Patient has no pulse and is not breathing):    CPR (Attempt to Resuscitate)     Medical Interventions (Patient has pulse or is breathing):    Full Support     Discharge Disposition:    Home-Health Care [facility]    Discharge Medications:       Discharge Medications        New Medications        Instructions Start Date   predniSONE 10 MG tablet  Commonly known as: DELTASONE   Take 3 tablets by mouth Daily for 3 days, THEN 2 tablets Daily for 3 days, THEN 1 tablet Daily for 3 days.   Start Date: December 23, 2024            Continue These Medications        Instructions Start Date   albuterol (2.5 MG/3ML) 0.083% nebulizer solution  Commonly known as: PROVENTIL   2.5 mg, Nebulization, Every 4 Hours PRN      albuterol sulfate  (90 Base) MCG/ACT  inhaler  Commonly known as: PROVENTIL HFA;VENTOLIN HFA;PROAIR HFA   2 puffs, Inhalation, Every 4 Hours PRN      amiodarone 200 MG tablet  Commonly known as: PACERONE   200 mg, Oral, Every 24 Hours Scheduled      apixaban 5 MG tablet tablet  Commonly known as: Eliquis   5 mg, Oral, Every 12 Hours      atorvastatin 40 MG tablet  Commonly known as: Lipitor   40 mg, Oral, Daily      benzonatate 100 MG capsule  Commonly known as: TESSALON   100 mg, Oral, 3 Times Daily PRN      Breztri Aerosphere 160-9-4.8 MCG/ACT aerosol inhaler  Generic drug: Budeson-Glycopyrrol-Formoterol   2 puffs, Inhalation, 2 Times Daily, Rinse mouth out after use      cetirizine 10 MG tablet  Commonly known as: zyrTEC   10 mg, Oral, Daily      clonazePAM 1 MG tablet  Commonly known as: KlonoPIN   1 mg, Oral, 2 Times Daily PRN      clotrimazole-betamethasone 1-0.05 % cream  Commonly known as: Lotrisone   1 Application, Topical, 2 Times Daily      Diclofenac Sodium 1 % gel gel  Commonly known as: VOLTAREN   4 g, Topical, 4 Times Daily PRN      DULoxetine 60 MG capsule  Commonly known as: CYMBALTA   60 mg, Oral, 2 Times Daily      fluticasone 50 MCG/ACT nasal spray  Commonly known as: FLONASE   2 sprays, Nasal, Daily      ipratropium-albuterol 0.5-2.5 mg/3 ml nebulizer  Commonly known as: DUO-NEB   USE 3 mL VIA NEBULIZER EVERY 4 HOURS AS NEEDED FOR WHEEZING      metoprolol succinate XL 25 MG 24 hr tablet  Commonly known as: TOPROL-XL   25 mg, Oral, Every 24 Hours Scheduled      O2  Commonly known as: OXYGEN   2 L/min, Inhalation, As Needed      ondansetron ODT 4 MG disintegrating tablet  Commonly known as: ZOFRAN-ODT   4 mg, Translingual, Every 8 Hours PRN      oxyCODONE-acetaminophen 7.5-325 MG per tablet  Commonly known as: PERCOCET   1 tablet, Oral, Every 4 Hours PRN      pantoprazole 40 MG EC tablet  Commonly known as: PROTONIX   40 mg, Oral, Daily      QUEtiapine 25 MG tablet  Commonly known as: SEROquel   12.5 mg, Oral, Nightly      sertraline  100 MG tablet  Commonly known as: ZOLOFT   TAKE 1 & 1/2 TABLETS BY MOUTH DAILY      sodium chloride 0.65 % nasal spray   2 sprays, Nasal, As Needed      traZODone 100 MG tablet  Commonly known as: DESYREL   TAKE 1 TABLET EVERY NIGHT AT BEDTIME BY MOUTH AS NEEDED      valsartan 40 MG tablet  Commonly known as: Diovan   40 mg, Oral, Daily             Discharge Diet:     Diet Instructions       Diet: Cardiac Diets; Healthy Heart (2-3 Na+); Regular (IDDSI 7); Thin (IDDSI 0)      Discharge Diet: Cardiac Diets    Cardiac Diet: Healthy Heart (2-3 Na+)    Texture: Regular (IDDSI 7)    Fluid Consistency: Thin (IDDSI 0)          Activity at Discharge:     Activity Instructions       Activity as Tolerated            Follow-up Appointments:    Additional Instructions for the Follow-ups that You Need to Schedule       Ambulatory Referral to Disease State Management   As directed      [facility] dept: [facility] [[id number]]   What program(s) are you referring for?: COPD   Follow-up needed: Yes        Ambulatory Referral to Home Health (Hospital)   As directed      Face to Face Visit Date: 12/23/2024   Follow-up provider for Plan of Care?: I treated the patient in an acute care facility and will not continue treatment after discharge.   Follow-up provider: JACKSON RUBIO [[id number]]   Reason/Clinical Findings: COPD, respiratory failure, hypercapnia, atrial fibrillation, hypertension.   Describe mobility limitations that make leaving home difficult: Generalized weakness, respiratory failure   Nursing/Therapeutic Services Requested: Skilled Nursing Physical Therapy Occupational Therapy   Skilled nursing orders: Medication education (BiPAP compliance) O2 instruction Mini-nebs COPD management Cardiopulmonary assessments Other   PT orders: Therapeutic exercise Gait Training Transfer training Strengthening   Weight Bearing Status: As Tolerated   Occupational orders: Activities of daily living Energy conservation Strengthening Fine  motor   Frequency: 1 Week 1               Follow-up Information       Krystina Saunders DO .    Specialty: Family Medicine  Why: Hospital Follow up on Friday January 3, 2025 at 11:00AM.  Contact information:  [address] [facility] [address]  [facility] [address] [phone]                           Future Appointments   Date Time Provider Department Center   1/3/2025 11:30 AM Krystina Saunders DO [facility]   1/30/2025  2:15 PM Taiwo Curry MD [facility]     Test Results Pending at Discharge:    Pending Results       None             Lenny Dewitt MD  12/23/24  10:37 EST    Time: I spent 35 minutes on this discharge activity which included: face-to-face encounter with the patient, reviewing the data in the system, coordination of the care with the nursing staff as well as consultants, documentation, and entering orders.     Dictated utilizing Dragon dictation.        Electronically signed by Lenny Dewitt MD at 12/23/24 [id number]

## 2024-12-23 NOTE — OUTREACH NOTE
Prep Survey      Flowsheet Row Responses   [facility] patient discharged from? [place]   Is LACE score < 7 ? No   Eligibility [facility]   Date of Admission 12/20/24   Date of Discharge 12/23/24   Discharge Disposition Home or Self Care   Discharge diagnosis COPD exacerbation   Does the patient have one of the following disease processes/diagnoses(primary or secondary)? COPD   Does the patient have Home health ordered? Yes   What is the Home health agency?  [facility]   Is there a DME ordered? No   Prep survey completed? Yes            Kianna HOLBROOK - Registered Nurse

## 2024-12-23 NOTE — PROGRESS NOTES
RT EQUIPMENT DEVICE RELATED - SKIN ASSESSMENT    Jed Score:  Jed Score: 19     RT Medical Equipment/Device:     NIV Mask:  Under-the-nose   size:  b    Skin Assessment:      Nose:  Intact    Device Skin Pressure Protection:   none    Nurse Notification:  No    Cortney Wood, CRT

## 2024-12-23 NOTE — DISCHARGE SUMMARY
[facility]   DISCHARGE SUMMARY      Name:  Gabi Dudley   Age:  79 y.o.  Sex:  female  :  1945  MRN:  [id number]   Visit Number:  [id number]    Admission Date:  2024  Date of Discharge:  2024  Primary Care Physician:  Krystina Saunders,     Important issues to note:    1.  Patient was admitted with acute COPD exacerbation and hypercapnia and was treated with bronchodilators, IV Solu-Medrol as well as BiPAP therapy.  Patient has been noncompliant with her home BiPAP therapy and has been strongly advised to be compliant with BiPAP.  2.  Patient will be discharged home with home health with prednisone taper.  3.  Follow-up with COPD discharge clinic.  4.  Patient does have chronic systolic heart failure and will follow-up with Dr. Curry as scheduled on 2024.  5.  Patient does live with her daughter and will be arranged home health services.  She already has home oxygen.    Discharge Diagnoses:     Acute on chronic hypoxic and hypercapnic respiratory failure, POA.  Acute COPD exacerbation, POA.  Essential hypertension, uncontrolled, POA.  Paroxysmal atrial fibrillation on apixaban.  Chronic systolic heart failure (likely secondary to hypertensive heart disease), no exacerbation.  Mild transaminitis, of uncertain etiology.  Depression and anxiety.  Chronic anemia.    Problem List:     Active Hospital Problems    Diagnosis  POA    **COPD exacerbation [J44.1]  Yes    Acute on chronic respiratory failure with hypoxia and hypercapnia [J96.21, J96.22]  Yes    Paroxysmal atrial fibrillation [I48.0]  Yes    Essential hypertension [I10]  Yes      Resolved Hospital Problems   No resolved problems to display.     Presenting Problem:    Chief Complaint   Patient presents with    Shortness of Breath      Consults:     Consulting Physician(s)                     None              Procedures Performed:    None.    History of presenting illness/Hospital Course:    Gabi Wells  Octavia is a 79-year-old female with history of COPD on home oxygen at 4 L, chronic systolic heart failure, hypertension, paroxysmal atrial fibrillation on apixaban, chronic back pain, GERD, generalized anxiety disorder, CAD, chronic anemia was brought to the emergency room by EMS with symptoms of shortness of breath.  Patient states that she has a CPAP machine at home but unfortunately has not been compliant.  She states that she quit smoking several years ago.  She lives with her daughter who apparently smokes.  Denies any fevers but does complain of generalized weakness and cough.     In the emergency room, she was afebrile but tachypneic at 32 with initial blood pressure 193/95 and pulse oxygen saturation of 97% on 4 L of nasal cannula oxygen.  Venous blood gas showed pH of 7.31, pCO2 87, pO2 17 and bicarb of 44.  Initial troponin 15 with repeat at 12.  proBNP 1444.  CMP was fairly unremarkable except for a CO2 of 34, alkaline phosphatase 165, AST 56, ALT 49.  CBC was unremarkable except for hemoglobin of 9.7 which seems to be her baseline.  COVID and flu test were negative.  Chest x-ray showed chronic bronchovascular markings.  EKG was negative for any acute changes.  Patient was given aspirin, dexamethasone, bronchodilator nebulizations in the emergency room.  She was placed on BiPAP therapy and due to poor tolerance, she was given Valium and oxycodone.  She was subsequently admitted to the medical floor with telemetry for management of acute COPD exacerbation, acute on chronic hypoxic and hypercapnic respiratory failure.    Patient slowly improved with treatment with budesonide, IV Solu-Medrol.  Procalcitonin levels are within normal range.  She was placed on BiPAP therapy with improvement in her CO2 levels.  Due to noncompliance with BiPAP, he is at high risk for readmission.  Patient states that she has quit smoking.  She also has chronic systolic heart failure and needs to follow-up with Dr. Curry as  scheduled.  Patient will be arranged home health services.  Follow-up with primary care provider in 1 week.    Respiratory failure/COPD exacerbation.  - Continue nasal cannula oxygen and intermittent BiPAP therapy.  - Bronchodilators, budesonide.  - Patient was treated with IV Solu-Medrol and will be discharged on prednisone taper.  - COVID and flu test were negative.  - Patient does have chronic hypercapnia possibly with a baseline CO2 of 60 and has been strongly advised to be compliant with her home BiPAP therapy.     Paroxysmal atrial fibrillation.  - Continue amiodarone, apixaban, Toprol-XL.    Chronic systolic heart failure.  - 2D echocardiogram from July 2024 showed a left ventricular ejection fraction of 30 to 35%.  - Likely due to combination of hypertensive heart disease and tachycardia induced cardiomyopathy.  - Continue Toprol-XL, valsartan.  - Refused SGLT2 secondary to frequent UTIs.  - We will hold off on spironolactone due to upper limit of normal potassium levels.  - Patient follows up with Dr. Curry.     Essential hypertension.  - Continue Toprol-XL, valsartan.     Mild transaminitis.  - Exact etiology of this is uncertain but could be related to statin therapy.  - Previous imaging did not show any evidence of fatty liver disease.  - Outpatient follow-up with PCP.    Vital Signs:    Temp:  [97.5 °F (36.4 °C)-98.6 °F (37 °C)] 97.5 °F (36.4 °C)  Heart Rate:  [64-72] 72  Resp:  [16-18] 16  BP: (102-142)/(55-75) 140/60    Physical Exam:    General Appearance:  Alert and cooperative.  Elderly frail female.  Chronic dyspnea at rest.   Head:  Atraumatic and normocephalic.   Eyes: Conjunctivae and sclerae normal, no icterus.  Looks pale.   Ears:  Ears with no abnormalities noted.   Throat: No oral lesions, no thrush, oral mucosa moist.   Neck: Supple, trachea midline, no thyromegaly.   Back:   No kyphoscoliosis present. No tenderness to palpation.   Lungs:   Breath sounds heard bilaterally equally.  No  crackles.  Occasional scattered wheezing. No Pleural rub or bronchial breathing.   Heart:  Normal S1 and S2, no murmur, no gallop, no rub. No JVD.   Abdomen:   Normal bowel sounds, no masses, no organomegaly. Soft, nontender, nondistended, no rebound tenderness.   Extremities: Supple, no edema, no cyanosis, no clubbing.   Pulses: Pulses palpable bilaterally.   Skin: No bleeding or rash.   Neurologic: Alert and oriented x 3. No facial asymmetry. Moves all four limbs but does have generalized weakness. No tremors.     Pertinent Lab Results:     Results from last 7 days   Lab Units 12/22/24  0559 12/21/24  0548 12/20/24  0337   SODIUM mmol/L 142 141 137   POTASSIUM mmol/L 4.0 4.2 4.7   CHLORIDE mmol/L 100 98 96*   CO2 mmol/L 36.6* 34.8* 33.7*   BUN mg/dL 34* 22 18   CREATININE mg/dL 0.57 0.48* 0.55*   CALCIUM mg/dL 9.2 9.6 9.2   BILIRUBIN mg/dL 0.2  --  <0.2   ALK PHOS U/L 127*  --  165*   ALT (SGPT) U/L 51*  --  49*   AST (SGOT) U/L 45*  --  56*   GLUCOSE mg/dL 97 133* 117*     Results from last 7 days   Lab Units 12/21/24  0548 12/20/24  0337   WBC 10*3/mm3 5.45 5.18   HEMOGLOBIN g/dL 9.8* 9.7*   HEMATOCRIT % 32.7* 33.5*   PLATELETS 10*3/mm3 186 172       Results from last 7 days   Lab Units 12/20/24  0506 12/20/24  0337   HSTROP T ng/L 12 15*     Results from last 7 days   Lab Units 12/20/24  0337   PROBNP pg/mL 1,444.0       Results from last 7 days   Lab Units 12/23/24  0839   PH, ARTERIAL pH units 7.373   PO2 ART mm Hg 82.6   PCO2, ARTERIAL mm Hg 67.1*   HCO3 ART mmol/L 39.1*     Pertinent Radiology Results:    Imaging Results (All)       Procedure Component Value Units Date/Time    XR Chest 1 View [[id number]] Collected: 12/20/24 0746     Updated: 12/20/24 0750    Narrative:       PROCEDURE: XR CHEST 1 VW-     HISTORY: Chest pain difficulty breathing     COMPARISON: August 15, 2023..     FINDINGS: The heart is mildly enlarged, similar to prior. Medial apices  not visualized on this exam secondary to patient's  head position.. The  mediastinum is unremarkable. Decreased inspiratory effort noted with  crowding of the lung markings in both lung bases. No acute infiltrate  noted. There is mild, bilateral interstitial disease, similar to prior.  Patient is rotated to the right.. There is no pneumothorax.  There are  no acute osseous abnormalities.       Impression:      Cardiomegaly and bilateral interstitial disease, similar to  prior..     This report was signed and finalized on 12/20/2024 7:48 AM by Angeles Collins MD.        Echo:    Results for orders placed during the hospital encounter of 07/22/24    Adult Transthoracic Echo Limited W/ Cont if Necessary Per Protocol    Interpretation Summary    Left ventricular systolic function is mildly to moderately decreased. Left ventricular ejection fraction appears to be 36 - 40%.    Regional wall motion abnormalities as below.    No left ventricular thrombus identified by contrasted study.    Condition on Discharge:      Stable.    Code status during the hospital stay:    Code Status and Medical Interventions: CPR (Attempt to Resuscitate); Full Support   Ordered at: 12/20/24 1439     Code Status (Patient has no pulse and is not breathing):    CPR (Attempt to Resuscitate)     Medical Interventions (Patient has pulse or is breathing):    Full Support     [place] [facility] [place] Medications:       Discharge Medications        New Medications        Instructions Start Date   predniSONE 10 MG tablet  Commonly known as: DELTASONE   Take 3 tablets by mouth Daily for 3 days, THEN 2 tablets Daily for 3 days, THEN 1 tablet Daily for 3 days.   Start Date: December 23, 2024            Continue These Medications        Instructions Start Date   albuterol (2.5 MG/3ML) 0.083% nebulizer solution  Commonly known as: PROVENTIL   2.5 mg, Nebulization, Every 4 Hours PRN      albuterol sulfate  (90 Base) MCG/ACT inhaler  Commonly known as: PROVENTIL  HFA;VENTOLIN HFA;PROAIR HFA   2 puffs, Inhalation, Every 4 Hours PRN      amiodarone 200 MG tablet  Commonly known as: PACERONE   200 mg, Oral, Every 24 Hours Scheduled      apixaban 5 MG tablet tablet  Commonly known as: Eliquis   5 mg, Oral, Every 12 Hours      atorvastatin 40 MG tablet  Commonly known as: Lipitor   40 mg, Oral, Daily      benzonatate 100 MG capsule  Commonly known as: TESSALON   100 mg, Oral, 3 Times Daily PRN      Breztri Aerosphere 160-9-4.8 MCG/ACT aerosol inhaler  Generic drug: Budeson-Glycopyrrol-Formoterol   2 puffs, Inhalation, 2 Times Daily, Rinse mouth out after use      cetirizine 10 MG tablet  Commonly known as: zyrTEC   10 mg, Oral, Daily      clonazePAM 1 MG tablet  Commonly known as: KlonoPIN   1 mg, Oral, 2 Times Daily PRN      clotrimazole-betamethasone 1-0.05 % cream  Commonly known as: Lotrisone   1 Application, Topical, 2 Times Daily      Diclofenac Sodium 1 % gel gel  Commonly known as: VOLTAREN   4 g, Topical, 4 Times Daily PRN      DULoxetine 60 MG capsule  Commonly known as: CYMBALTA   60 mg, Oral, 2 Times Daily      fluticasone 50 MCG/ACT nasal spray  Commonly known as: FLONASE   2 sprays, Nasal, Daily      ipratropium-albuterol 0.5-2.5 mg/3 ml nebulizer  Commonly known as: DUO-NEB   USE 3 mL VIA NEBULIZER EVERY 4 HOURS AS NEEDED FOR WHEEZING      metoprolol succinate XL 25 MG 24 hr tablet  Commonly known as: TOPROL-XL   25 mg, Oral, Every 24 Hours Scheduled      O2  Commonly known as: OXYGEN   2 L/min, Inhalation, As Needed      ondansetron ODT 4 MG disintegrating tablet  Commonly known as: ZOFRAN-ODT   4 mg, Translingual, Every 8 Hours PRN      oxyCODONE-acetaminophen 7.5-325 MG per tablet  Commonly known as: PERCOCET   1 tablet, Oral, Every 4 Hours PRN      pantoprazole 40 MG EC tablet  Commonly known as: PROTONIX   40 mg, Oral, Daily      QUEtiapine 25 MG tablet  Commonly known as: SEROquel   12.5 mg, Oral, Nightly      sertraline 100 MG tablet  Commonly known as:  ZOLOFT   TAKE 1 & 1/2 TABLETS BY MOUTH DAILY      sodium chloride 0.65 % nasal spray   2 sprays, Nasal, As Needed      traZODone 100 MG tablet  Commonly known as: DESYREL   TAKE 1 TABLET EVERY NIGHT AT BEDTIME BY MOUTH AS NEEDED      valsartan 40 MG tablet  Commonly known as: Diovan   40 mg, Oral, Daily             Discharge Diet:     Diet Instructions       Diet: Cardiac Diets; Healthy Heart (2-3 Na+); Regular (IDDSI 7); Thin (IDDSI 0)      Discharge Diet: Cardiac Diets    Cardiac Diet: Healthy Heart (2-3 Na+)    Texture: Regular (IDDSI 7)    Fluid Consistency: Thin (IDDSI 0)          Activity at Discharge:     Activity Instructions       Activity as Tolerated            Follow-up Appointments:    Additional Instructions for the Follow-ups that You Need to Schedule       Ambulatory Referral to Disease State Management   As directed      [facility] dept: [facility] [[id number]]   What program(s) are you referring for?: COPD   Follow-up needed: Yes        Ambulatory Referral to Home Health (Hospital)   As directed      Face to Face Visit Date: 12/23/2024   Follow-up provider for Plan of Care?: I treated the patient in an acute care facility and will not continue treatment after discharge.   Follow-up provider: JACKSON RUBIO [[id number]]   Reason/Clinical Findings: COPD, respiratory failure, hypercapnia, atrial fibrillation, hypertension.   Describe mobility limitations that make leaving home difficult: Generalized weakness, respiratory failure   Nursing/Therapeutic Services Requested: Skilled Nursing Physical Therapy Occupational Therapy   Skilled nursing orders: Medication education (BiPAP compliance) O2 instruction Mini-nebs COPD management Cardiopulmonary assessments Other   PT orders: Therapeutic exercise Gait Training Transfer training Strengthening   Weight Bearing Status: As Tolerated   Occupational orders: Activities of daily living Energy conservation Strengthening Fine motor   Frequency: 1 Week 1                Follow-up Information       Krystina Saunders DO .    Specialty: Family Medicine  Why: Hospital Follow up on Friday January 3, 2025 at 11:00AM.  Contact information:  [address]  [facility] [address]  [place] [address] [phone]                           Future Appointments   Date Time Provider Department Center   1/3/2025 11:30 AM Krystina Saunders DO [facility]   1/30/2025  2:15 PM Taiwo Curry MD [facility]     Test Results Pending at Discharge:    Pending Results       None             Lenny Dewitt MD  12/23/24  10:37 EST    Time: I spent 35 minutes on this discharge activity which included: face-to-face encounter with the patient, reviewing the data in the system, coordination of the care with the nursing staff as well as consultants, documentation, and entering orders.     Dictated utilizing Dragon dictation.

## 2024-12-24 ENCOUNTER — TRANSITIONAL CARE MANAGEMENT TELEPHONE ENCOUNTER (OUTPATIENT)
Dept: CALL CENTER | Facility: HOSPITAL | Age: 79
End: 2024-12-24
Payer: MEDICARE

## 2024-12-24 ENCOUNTER — TELEPHONE (OUTPATIENT)
Dept: INTERNAL MEDICINE | Facility: CLINIC | Age: 79
End: 2024-12-24
Payer: MEDICARE

## 2024-12-24 NOTE — OUTREACH NOTE
"Call Center TCM Note      Flowsheet Row Responses   [facility] patient discharged from? [facility]   Does the patient have one of the following disease processes/diagnoses(primary or secondary)? COPD   TCM attempt successful? Yes   Call start time 1130   Call end time 1137   Discharge diagnosis COPD exacerbation   Person spoke with today (if not patient) and relationship daughter Malu Guerrero reviewed with patient/caregiver? Yes   Is the patient having any side effects they believe may be caused by any medication additions or changes? No   Does the patient have all medications ordered at discharge? Yes   Is the patient taking all medications as directed (includes completed medication regime)? Yes   Comments Appt on 1/3 @ 11:30 with Dr. Saunders.  Daughter states this is early for them and if later in the day appt can be made.  Will message offie regarding this.   Does the patient have an appointment with their PCP within 7-14 days of discharge? Yes   What is the Home health agency?  [facility]   Has home health visited the patient within 72 hours of discharge? Call prior to 72 hours   Pulse Ox monitoring Intermittent   O2 Sat comments High 90s on 4L   O2 Sat: education provided Sat levels, When to seek care   Psychosocial issues? No   Did the patient receive a copy of their discharge instructions? Yes   Nursing interventions Reviewed instructions with patient   What is the patient's perception of their health status since discharge? Improving   Nursing Interventions Nurse provided patient education   If the patient is a current smoker, are they able to teach back resources for cessation? Not a smoker   Is the patient/caregiver able to teach back the hierarchy of who to call/visit for symptoms/problems? PCP, Specialist, Home health nurse, Urgent Care, ED, 911 Yes   Additional teach back comments States she is doing well but just \"super super tired\".  She is going to contact [employer] regarding her bipap to make " adjustments.   Patient reports what zone on this call? Green Zone   Green Zone Reports doing well, Slept well last night, Appetite is good   Green Zone interventions: Take daily medications, Use oxygen as prescribed, Continue regular exercise/diet plan   TCM call completed? Yes   Wrap up additional comments Will message PCP for later in the day appt.   Call end time [id number]            Shu Buckner LPN    12/24/2024, 11:39 EST

## 2024-12-24 NOTE — TELEPHONE ENCOUNTER
Meghan with [facility] called and wants to know if Dr Saunders will follow pt with [facility] once d/c from hospital? Please call [phone]

## 2024-12-26 ENCOUNTER — TELEPHONE (OUTPATIENT)
Dept: INTERNAL MEDICINE | Facility: CLINIC | Age: 79
End: 2024-12-26
Payer: MEDICARE

## 2024-12-26 NOTE — TELEPHONE ENCOUNTER
Caller: [facility]    Relationship:     Best call back number: [phone]     What is the best time to reach you: ANY    Who are you requesting to speak with (clinical staff, provider,  specific staff member): NURSE    Do you know the name of the person who called: RD    What was the call regarding: WILL DR RUBIO FOLLOW HOME HEALTH ORDERS FOR THIS PATIENT?    Is it okay if the provider responds through MyChart: PHONE CALL PLEASE

## 2024-12-26 NOTE — TELEPHONE ENCOUNTER
Meghan with [facility] stated that na is her  and would like her to do PT and HH. I tried to call pt and left a voicemail. They wanted to know if Gabi was agreeable if this is something you would be okay with?

## 2025-01-02 ENCOUNTER — HOSPITAL ENCOUNTER (EMERGENCY)
Facility: HOSPITAL | Age: 80
Discharge: OTHER INSTITUTION | End: 2025-01-02
Attending: EMERGENCY MEDICINE
Payer: MEDICARE

## 2025-01-02 ENCOUNTER — APPOINTMENT (OUTPATIENT)
Dept: CT IMAGING | Facility: HOSPITAL | Age: 80
End: 2025-01-02
Payer: MEDICARE

## 2025-01-02 ENCOUNTER — APPOINTMENT (OUTPATIENT)
Dept: GENERAL RADIOLOGY | Facility: HOSPITAL | Age: 80
End: 2025-01-02
Payer: MEDICARE

## 2025-01-02 VITALS
RESPIRATION RATE: 20 BRPM | WEIGHT: 140 LBS | DIASTOLIC BLOOD PRESSURE: 59 MMHG | OXYGEN SATURATION: 96 % | TEMPERATURE: 97.1 F | SYSTOLIC BLOOD PRESSURE: 123 MMHG | HEART RATE: 68 BPM | BODY MASS INDEX: 23.32 KG/M2 | HEIGHT: 65 IN

## 2025-01-02 DIAGNOSIS — S22.020A COMPRESSION FRACTURE OF T2 VERTEBRA, INITIAL ENCOUNTER: ICD-10-CM

## 2025-01-02 DIAGNOSIS — S32.030A CLOSED COMPRESSION FRACTURE OF L3 VERTEBRA, INITIAL ENCOUNTER: ICD-10-CM

## 2025-01-02 DIAGNOSIS — K57.92 ACUTE DIVERTICULITIS: ICD-10-CM

## 2025-01-02 DIAGNOSIS — S22.050A COMPRESSION FRACTURE OF T5 VERTEBRA, INITIAL ENCOUNTER: ICD-10-CM

## 2025-01-02 DIAGNOSIS — J96.12 CHRONIC RESPIRATORY FAILURE WITH HYPERCAPNIA: ICD-10-CM

## 2025-01-02 DIAGNOSIS — R29.6 RECURRENT FALLS: Primary | ICD-10-CM

## 2025-01-02 DIAGNOSIS — D64.9 ANEMIA, UNSPECIFIED TYPE: ICD-10-CM

## 2025-01-02 DIAGNOSIS — N28.89 RENAL MASS: ICD-10-CM

## 2025-01-02 DIAGNOSIS — S22.060A COMPRESSION FRACTURE OF T7 VERTEBRA, INITIAL ENCOUNTER: ICD-10-CM

## 2025-01-02 LAB
A-A DO2: ABNORMAL
ALBUMIN SERPL-MCNC: 3.8 G/DL (ref 3.5–5.2)
ALBUMIN/GLOB SERPL: 1.7 G/DL
ALP SERPL-CCNC: 130 U/L (ref 39–117)
ALT SERPL W P-5'-P-CCNC: 48 U/L (ref 1–33)
ANION GAP SERPL CALCULATED.3IONS-SCNC: 4.9 MMOL/L (ref 5–15)
ARTERIAL PATENCY WRIST A: POSITIVE
AST SERPL-CCNC: 31 U/L (ref 1–32)
ATMOSPHERIC PRESS: 740 MMHG
B PARAPERT DNA SPEC QL NAA+PROBE: NOT DETECTED
B PERT DNA SPEC QL NAA+PROBE: NOT DETECTED
BASE EXCESS BLDA CALC-SCNC: 10.6 MMOL/L (ref 0–2)
BASOPHILS # BLD AUTO: 0.02 10*3/MM3 (ref 0–0.2)
BASOPHILS NFR BLD AUTO: 0.2 % (ref 0–1.5)
BDY SITE: ABNORMAL
BILIRUB SERPL-MCNC: 0.2 MG/DL (ref 0–1.2)
BUN SERPL-MCNC: 21 MG/DL (ref 8–23)
BUN/CREAT SERPL: 33.9 (ref 7–25)
C PNEUM DNA NPH QL NAA+NON-PROBE: NOT DETECTED
CALCIUM SPEC-SCNC: 8.6 MG/DL (ref 8.6–10.5)
CHLORIDE SERPL-SCNC: 99 MMOL/L (ref 98–107)
CK MB SERPL-CCNC: 1.99 NG/ML
CK SERPL-CCNC: 19 U/L (ref 20–180)
CO2 SERPL-SCNC: 36.1 MMOL/L (ref 22–29)
COHGB MFR BLD: 0.9 % (ref 0–2)
CREAT SERPL-MCNC: 0.62 MG/DL (ref 0.57–1)
D-LACTATE SERPL-SCNC: 0.6 MMOL/L (ref 0.5–2)
DEPRECATED RDW RBC AUTO: 50.2 FL (ref 37–54)
EGFRCR SERPLBLD CKD-EPI 2021: 91.9 ML/MIN/1.73
EOSINOPHIL # BLD AUTO: 0.09 10*3/MM3 (ref 0–0.4)
EOSINOPHIL NFR BLD AUTO: 1.1 % (ref 0.3–6.2)
ERYTHROCYTE [DISTWIDTH] IN BLOOD BY AUTOMATED COUNT: 15.9 % (ref 12.3–15.4)
FLUAV SUBTYP SPEC NAA+PROBE: NOT DETECTED
FLUBV RNA ISLT QL NAA+PROBE: NOT DETECTED
GAS FLOW AIRWAY: 4 LPM
GEN 5 1HR TROPONIN T REFLEX: 18 NG/L
GLOBULIN UR ELPH-MCNC: 2.2 GM/DL
GLUCOSE SERPL-MCNC: 86 MG/DL (ref 65–99)
HADV DNA SPEC NAA+PROBE: NOT DETECTED
HCO3 BLDA-SCNC: 37.7 MMOL/L (ref 22–28)
HCOV 229E RNA SPEC QL NAA+PROBE: NOT DETECTED
HCOV HKU1 RNA SPEC QL NAA+PROBE: NOT DETECTED
HCOV NL63 RNA SPEC QL NAA+PROBE: NOT DETECTED
HCOV OC43 RNA SPEC QL NAA+PROBE: NOT DETECTED
HCT VFR BLD AUTO: 33 % (ref 34–46.6)
HCT VFR BLD CALC: 28.7 %
HGB BLD-MCNC: 9.8 G/DL (ref 12–15.9)
HMPV RNA NPH QL NAA+NON-PROBE: NOT DETECTED
HOLD SPECIMEN: NORMAL
HOLD SPECIMEN: NORMAL
HPIV1 RNA ISLT QL NAA+PROBE: NOT DETECTED
HPIV2 RNA SPEC QL NAA+PROBE: NOT DETECTED
HPIV3 RNA NPH QL NAA+PROBE: NOT DETECTED
HPIV4 P GENE NPH QL NAA+PROBE: NOT DETECTED
HYPOCHROMIA BLD QL: NORMAL
IMM GRANULOCYTES # BLD AUTO: 0.05 10*3/MM3 (ref 0–0.05)
IMM GRANULOCYTES NFR BLD AUTO: 0.6 % (ref 0–0.5)
LYMPHOCYTES # BLD AUTO: 1.5 10*3/MM3 (ref 0.7–3.1)
LYMPHOCYTES NFR BLD AUTO: 17.8 % (ref 19.6–45.3)
Lab: ABNORMAL
M PNEUMO IGG SER IA-ACNC: NOT DETECTED
MAGNESIUM SERPL-MCNC: 1.8 MG/DL (ref 1.6–2.4)
MCH RBC QN AUTO: 26 PG (ref 26.6–33)
MCHC RBC AUTO-ENTMCNC: 29.7 G/DL (ref 31.5–35.7)
MCV RBC AUTO: 87.5 FL (ref 79–97)
METHGB BLD QL: 0.8 % (ref 0–1.5)
MODALITY: ABNORMAL
MONOCYTES # BLD AUTO: 0.5 10*3/MM3 (ref 0.1–0.9)
MONOCYTES NFR BLD AUTO: 5.9 % (ref 5–12)
NEUTROPHILS NFR BLD AUTO: 6.28 10*3/MM3 (ref 1.7–7)
NEUTROPHILS NFR BLD AUTO: 74.4 % (ref 42.7–76)
NRBC BLD AUTO-RTO: 0 /100 WBC (ref 0–0.2)
OXYHGB MFR BLDV: 94.9 % (ref 94–99)
PCO2 BLDA: 66 MM HG (ref 35–45)
PCO2 TEMP ADJ BLD: ABNORMAL MM[HG]
PH BLDA: 7.37 PH UNITS (ref 7.3–7.5)
PH, TEMP CORRECTED: ABNORMAL
PLAT MORPH BLD: NORMAL
PLATELET # BLD AUTO: 176 10*3/MM3 (ref 140–450)
PMV BLD AUTO: 10.6 FL (ref 6–12)
PO2 BLDA: 84 MM HG (ref 75–100)
PO2 TEMP ADJ BLD: ABNORMAL MM[HG]
POTASSIUM SERPL-SCNC: 4.2 MMOL/L (ref 3.5–5.2)
PROCALCITONIN SERPL-MCNC: 0.07 NG/ML (ref 0–0.25)
PROT SERPL-MCNC: 6 G/DL (ref 6–8.5)
RBC # BLD AUTO: 3.77 10*6/MM3 (ref 3.77–5.28)
RHINOVIRUS RNA SPEC NAA+PROBE: NOT DETECTED
RSV RNA NPH QL NAA+NON-PROBE: NOT DETECTED
SAO2 % BLDCOA: 96.6 % (ref 94–100)
SARS-COV-2 RNA NPH QL NAA+NON-PROBE: NOT DETECTED
SODIUM SERPL-SCNC: 140 MMOL/L (ref 136–145)
TROPONIN T % DELTA: -10 %
TROPONIN T NUMERIC DELTA: -2 NG/L
TROPONIN T SERPL HS-MCNC: 20 NG/L
VENTILATOR MODE: ABNORMAL
WBC MORPH BLD: NORMAL
WBC NRBC COR # BLD AUTO: 8.44 10*3/MM3 (ref 3.4–10.8)
WHOLE BLOOD HOLD COAG: NORMAL
WHOLE BLOOD HOLD SPECIMEN: NORMAL

## 2025-01-02 PROCEDURE — 72131 CT LUMBAR SPINE W/O DYE: CPT

## 2025-01-02 PROCEDURE — 82805 BLOOD GASES W/O2 SATURATION: CPT

## 2025-01-02 PROCEDURE — 80053 COMPREHEN METABOLIC PANEL: CPT | Performed by: EMERGENCY MEDICINE

## 2025-01-02 PROCEDURE — 82553 CREATINE MB FRACTION: CPT

## 2025-01-02 PROCEDURE — 84484 ASSAY OF TROPONIN QUANT: CPT | Performed by: EMERGENCY MEDICINE

## 2025-01-02 PROCEDURE — 84145 PROCALCITONIN (PCT): CPT

## 2025-01-02 PROCEDURE — 25010000002 CEFTRIAXONE PER 250 MG

## 2025-01-02 PROCEDURE — 25010000002 ONDANSETRON PER 1 MG

## 2025-01-02 PROCEDURE — 82375 ASSAY CARBOXYHB QUANT: CPT

## 2025-01-02 PROCEDURE — 94640 AIRWAY INHALATION TREATMENT: CPT

## 2025-01-02 PROCEDURE — 96367 TX/PROPH/DG ADDL SEQ IV INF: CPT

## 2025-01-02 PROCEDURE — 0202U NFCT DS 22 TRGT SARS-COV-2: CPT

## 2025-01-02 PROCEDURE — 71045 X-RAY EXAM CHEST 1 VIEW: CPT

## 2025-01-02 PROCEDURE — 93005 ELECTROCARDIOGRAM TRACING: CPT | Performed by: EMERGENCY MEDICINE

## 2025-01-02 PROCEDURE — 96365 THER/PROPH/DIAG IV INF INIT: CPT

## 2025-01-02 PROCEDURE — 36415 COLL VENOUS BLD VENIPUNCTURE: CPT

## 2025-01-02 PROCEDURE — 87040 BLOOD CULTURE FOR BACTERIA: CPT

## 2025-01-02 PROCEDURE — 25510000001 IOPAMIDOL 61 % SOLUTION: Performed by: EMERGENCY MEDICINE

## 2025-01-02 PROCEDURE — 25010000002 DEXAMETHASONE SODIUM PHOSPHATE 10 MG/ML SOLUTION

## 2025-01-02 PROCEDURE — 25010000002 METRONIDAZOLE 500 MG/100ML SOLUTION

## 2025-01-02 PROCEDURE — 83050 HGB METHEMOGLOBIN QUAN: CPT

## 2025-01-02 PROCEDURE — 72125 CT NECK SPINE W/O DYE: CPT

## 2025-01-02 PROCEDURE — 82550 ASSAY OF CK (CPK): CPT

## 2025-01-02 PROCEDURE — 85025 COMPLETE CBC W/AUTO DIFF WBC: CPT | Performed by: EMERGENCY MEDICINE

## 2025-01-02 PROCEDURE — 83735 ASSAY OF MAGNESIUM: CPT | Performed by: EMERGENCY MEDICINE

## 2025-01-02 PROCEDURE — 74177 CT ABD & PELVIS W/CONTRAST: CPT

## 2025-01-02 PROCEDURE — 85007 BL SMEAR W/DIFF WBC COUNT: CPT | Performed by: EMERGENCY MEDICINE

## 2025-01-02 PROCEDURE — 36600 WITHDRAWAL OF ARTERIAL BLOOD: CPT

## 2025-01-02 PROCEDURE — 25010000002 HYDROMORPHONE 1 MG/ML SOLUTION: Performed by: EMERGENCY MEDICINE

## 2025-01-02 PROCEDURE — 70450 CT HEAD/BRAIN W/O DYE: CPT

## 2025-01-02 PROCEDURE — 96375 TX/PRO/DX INJ NEW DRUG ADDON: CPT

## 2025-01-02 PROCEDURE — 83605 ASSAY OF LACTIC ACID: CPT

## 2025-01-02 PROCEDURE — 99291 CRITICAL CARE FIRST HOUR: CPT | Performed by: EMERGENCY MEDICINE

## 2025-01-02 PROCEDURE — 72128 CT CHEST SPINE W/O DYE: CPT

## 2025-01-02 PROCEDURE — 71275 CT ANGIOGRAPHY CHEST: CPT

## 2025-01-02 RX ORDER — DEXAMETHASONE SODIUM PHOSPHATE 10 MG/ML
10 INJECTION, SOLUTION INTRAMUSCULAR; INTRAVENOUS ONCE
Status: COMPLETED | OUTPATIENT
Start: 2025-01-02 | End: 2025-01-02

## 2025-01-02 RX ORDER — ONDANSETRON 2 MG/ML
4 INJECTION INTRAMUSCULAR; INTRAVENOUS ONCE
Status: COMPLETED | OUTPATIENT
Start: 2025-01-02 | End: 2025-01-02

## 2025-01-02 RX ORDER — METRONIDAZOLE 500 MG/100ML
500 INJECTION, SOLUTION INTRAVENOUS ONCE
Status: COMPLETED | OUTPATIENT
Start: 2025-01-02 | End: 2025-01-02

## 2025-01-02 RX ORDER — SODIUM CHLORIDE 0.9 % (FLUSH) 0.9 %
10 SYRINGE (ML) INJECTION AS NEEDED
Status: DISCONTINUED | OUTPATIENT
Start: 2025-01-02 | End: 2025-01-02 | Stop reason: HOSPADM

## 2025-01-02 RX ORDER — IOPAMIDOL 612 MG/ML
100 INJECTION, SOLUTION INTRAVASCULAR
Status: COMPLETED | OUTPATIENT
Start: 2025-01-02 | End: 2025-01-02

## 2025-01-02 RX ORDER — IPRATROPIUM BROMIDE AND ALBUTEROL SULFATE 2.5; .5 MG/3ML; MG/3ML
3 SOLUTION RESPIRATORY (INHALATION) ONCE
Status: COMPLETED | OUTPATIENT
Start: 2025-01-02 | End: 2025-01-02

## 2025-01-02 RX ORDER — ACETAMINOPHEN 325 MG/1
975 TABLET ORAL ONCE
Status: COMPLETED | OUTPATIENT
Start: 2025-01-02 | End: 2025-01-02

## 2025-01-02 RX ADMIN — METRONIDAZOLE 500 MG: 5 INJECTION, SOLUTION INTRAVENOUS at 21:09

## 2025-01-02 RX ADMIN — IOPAMIDOL 100 ML: 612 INJECTION, SOLUTION INTRAVENOUS at 18:11

## 2025-01-02 RX ADMIN — IPRATROPIUM BROMIDE AND ALBUTEROL SULFATE 3 ML: .5; 3 SOLUTION RESPIRATORY (INHALATION) at 19:21

## 2025-01-02 RX ADMIN — ONDANSETRON 4 MG: 2 INJECTION INTRAMUSCULAR; INTRAVENOUS at 17:53

## 2025-01-02 RX ADMIN — SODIUM CHLORIDE 2000 MG: 9 INJECTION, SOLUTION INTRAVENOUS at 20:25

## 2025-01-02 RX ADMIN — HYDROMORPHONE HYDROCHLORIDE 0.5 MG: 1 INJECTION, SOLUTION INTRAMUSCULAR; INTRAVENOUS; SUBCUTANEOUS at 20:25

## 2025-01-02 RX ADMIN — DEXAMETHASONE SODIUM PHOSPHATE 10 MG: 10 INJECTION INTRAMUSCULAR; INTRAVENOUS at 18:54

## 2025-01-02 RX ADMIN — ACETAMINOPHEN 975 MG: 325 TABLET, FILM COATED ORAL at 17:53

## 2025-01-02 NOTE — ED PROVIDER NOTES
"Subjective  History of Present Illness:    Patient is a 79-year-old female, history of anemia, atrial fibrillation, anticoagulated on Eliquis except for in the recent weeks patient has not had access to her Eliquis due to cardiology office not having free samples for her, she has been off her Eliquis now for over a week.  She reports this morning that she had an episode of chest pain with 5, \"thumps\".  She reports associated shortness of breath which is relatively chronic, on baseline oxygen.  She reports a cough productive of sputum.  No known fevers or sick contacts.  No urinary symptoms patient's daughter is at bedside with her, reports history of 3 falls today.  Patient reports that she has had dizzy spells for quite some time that occur with standing.  Patient reports that this occurred 3 separate times today causing her to fall, reports that each time she fell, she laid there until she \"came to her senses\", but denies any loss of consciousness.  She reports that she is sore all over including in her back.  Has a history of 2 remote compression fractures.  Patient reports that she has had difficulty ambulating today due to generalized weakness, denies any numbness or tingling.  Reports headaches post fall.  Denies any hematochezia or melena, however daughter and patient both report that she has had some diarrhea and nausea over the last couple days but denies abdominal pain      Nurses Notes reviewed and agree, including vitals, allergies, social history and prior medical history.     REVIEW OF SYSTEMS: All systems reviewed and not pertinent unless noted.  Review of Systems   Constitutional:  Negative for fever.   Respiratory:  Positive for cough and shortness of breath.    Cardiovascular:  Positive for chest pain. Negative for leg swelling.   Gastrointestinal:  Positive for diarrhea and nausea. Negative for abdominal pain, blood in stool and vomiting.   Genitourinary:  Negative for dysuria.   Neurological:  " Positive for dizziness, light-headedness and headaches. Negative for facial asymmetry, speech difficulty and numbness.   All other systems reviewed and are negative.      Past Medical History:   Diagnosis Date    Adrenal adenoma     Anemia     Arrhythmia     Asthma     Atrial fibrillation     Back pain     Benign colonic polyp     Benign tumor of adrenal gland     Cataract     bilateral    Cholelithiasis     Chronic bronchitis     Chronic bronchitis with COPD (chronic obstructive pulmonary disease)     COPD (chronic obstructive pulmonary disease) 2005    Coronary artery disease     Depression     Diverticulosis Years ago    Elevated cholesterol     Environmental and seasonal allergies     Fibromyalgia     Fibromyalgia, primary Sometime in the 90s    Gastritis     Generalized anxiety disorder     GERD (gastroesophageal reflux disease)     H/O mammogram     Headache Years ago    Hemorrhoids     History of blood transfusion 1985    History of blood transfusion 1985    History of echocardiogram     History of endometriosis     History of nuclear stress test     Hospitalization or health care facility admission within last 6 months     5 times between 11/2022-05/2023    Hypertension     IBS (irritable bowel syndrome)     Impaired functional mobility, balance, gait, and endurance     Impaired mobility     Inverted nipple     Kidney disease     Kidney stone     Liver cyst     Low back pain Years ago    Nodular radiologic density     Nodule of left lung     NSTEMI (non-ST elevated myocardial infarction) 9/24/2024    On home oxygen therapy     2 liters NC QHS    Osteoarthritis     Osteopenia Several years ago    Osteoporosis     PONV (postoperative nausea and vomiting)     Renal cyst     Sinus problem     2014    Sinusitis     Skin cancer     basal cell carcinoma    SOB (shortness of breath)     Tobacco use     Urinary frequency     Urinary tract infection Years ago    Frequent UTIs    Vitamin D deficiency     Wears glasses      Wears partial dentures     upper plate       Allergies:    Ativan [lorazepam] and Doxycycline      Past Surgical History:   Procedure Laterality Date    APPENDECTOMY      CARDIAC CATHETERIZATION      CATARACT EXTRACTION      both eyes    CHOLECYSTECTOMY      CHOLECYSTECTOMY WITH INTRAOPERATIVE CHOLANGIOGRAM N/A 10/30/2020    Procedure: CHOLECYSTECTOMY LAPAROSCOPIC INTRAOPERATIVE CHOLANGIOGRAPHY;  Surgeon: Sima Moses MD;  Location: [facility] OR;  Service: General;  Laterality: N/A;    COLON SURGERY      COLONOSCOPY  2013    COLONOSCOPY  2016    COLONOSCOPY N/A 2019    Procedure: COLONOSCOPY W/ COLD FORCEP POLYPECTOMIES; HOT SNARE POLYPECTOMIES; COLD SNARE POLYPECTOMY;  Surgeon: Goyo Nunez MD;  Location: [facility] ENDOSCOPY;  Service: Gastroenterology    COLONOSCOPY W/ BIOPSIES AND POLYPECTOMY      EXPLORATORY LAPAROTOMY N/A 2020    Procedure: colectomy, right, closure of enterotomy x 2, reduction of internal volvulus;  Surgeon: Sima Moses MD;  Location: [facility] OR;  Service: General;  Laterality: N/A;    EXPLORATORY LAPAROTOMY N/A 2023    Procedure: LAPAROTOMY EXPLORATORY WITH LYIS OF ADHESIONS AND  CENTRAL LINE INSERTION;  Surgeon: Bianca Isaac DO;  Location: [facility] OR;  Service: General;  Laterality: N/A;    HYSTERECTOMY      partial    LYSIS OF ABDOMINAL ADHESIONS      TONSILLECTOMY      UPPER GASTROINTESTINAL ENDOSCOPY  2015    VAGINAL DELIVERY      x2         Social History     Socioeconomic History    Marital status:    Tobacco Use    Smoking status: Former     Current packs/day: 0.00     Average packs/day: 0.3 packs/day for 30.0 years (7.5 ttl pk-yrs)     Types: Cigarettes     Start date: 1990     Quit date: 2020     Years since quittin.1     Passive exposure: Past    Smokeless tobacco: Never   Vaping Use    Vaping status: Never Used   Substance and Sexual Activity    Alcohol use: No    Drug use: No     Sexual activity: Not Currently     Birth control/protection: Post-menopausal         Family History   Problem Relation Age of Onset    Stomach cancer Brother     Colon cancer Maternal Aunt     Brain cancer Father     Arthritis Father     Hypertension Father     Cancer Father         Father, brother, and aunt    Lung cancer Paternal Grandfather     Heart disease Mother         Mother passed away due to heart disease    Breast cancer Neg Hx     Ovarian cancer Neg Hx        Objective  Physical Exam:  /54   Pulse 72   Temp 97.1 °F (36.2 °C) (Oral)   Resp 22   Wt 63.5 kg (140 lb)   SpO2 97%   .30 kg/m²      Physical Exam  Vitals and nursing note reviewed.   Constitutional:       General: She is not in acute distress.     Appearance: She is not ill-appearing, toxic-appearing or diaphoretic.      Comments: Chronically ill-appearing   HENT:      Head: Normocephalic and atraumatic.      Nose: Nose normal.      Mouth/Throat:      Mouth: Mucous membranes are moist.      Pharynx: Oropharynx is clear.   Eyes:      Extraocular Movements: Extraocular movements intact.      Conjunctiva/sclera: Conjunctivae normal.      Pupils: Pupils are equal, round, and reactive to light.   Cardiovascular:      Rate and Rhythm: Normal rate and regular rhythm.      Pulses: Normal pulses.      Heart sounds: Normal heart sounds.   Pulmonary:      Effort: Pulmonary effort is normal. No respiratory distress.      Breath sounds: No stridor. No wheezing, rhonchi or rales.      Comments: Decreased sounds bilateral upper lobes  Chest:      Chest wall: No tenderness.   Abdominal:      General: There is no distension.      Palpations: Abdomen is soft.      Tenderness: There is no abdominal tenderness. There is no guarding or rebound.   Musculoskeletal:         General: Normal range of motion.      Cervical back: Normal range of motion and neck supple.   Skin:     General: Skin is warm and dry.      Capillary Refill: Capillary refill takes  less than 2 seconds.   Neurological:      General: No focal deficit present.      Mental Status: She is alert and oriented to person, place, and time.      Cranial Nerves: No cranial nerve deficit.      Sensory: No sensory deficit.      Motor: No weakness.      Coordination: Coordination normal.   Psychiatric:         Mood and Affect: Mood normal.         Behavior: Behavior normal.         Thought Content: Thought content normal.         Judgment: Judgment normal.               Critical Care    Performed by: Brady Redmond PA-C  Authorized by: Sofia Watson MD    Critical care provider statement:     Critical care time (minutes):  45    Critical care was necessary to treat or prevent imminent or life-threatening deterioration of the following conditions:  Trauma    Critical care was time spent personally by me on the following activities:  Blood draw for specimens, development of treatment plan with patient or surrogate, discussions with consultants, examination of patient, ordering and performing treatments and interventions, ordering and review of laboratory studies, ordering and review of radiographic studies, pulse oximetry, re-evaluation of patient's condition, review of old charts and evaluation of patient's response to treatment    Care discussed with: accepting provider at another facility        ED Course:    ED Course as of 01/02/25 1959 Thu Jan 02, 2025   1836 pCO2, Arterial(!!): 66.0 [JR]      ED Course User Index  [JR] Brady Redmond PA-C       Lab Results (last 24 hours)       Procedure Component Value Units Date/Time    CBC & Differential [[id number]]  (Abnormal) Collected: 01/02/25 1721    Specimen: Blood Updated: 01/02/25 1757    Narrative:      The following orders were created for panel order CBC & Differential.  Procedure                               Abnormality         Status                     ---------                               -----------         ------                      CBC Auto Differential[[id number]]        Abnormal            Final result               Scan Slide[[id number]]                                       Final result                 Please view results for these tests on the individual orders.    Comprehensive Metabolic Panel [[id number]]  (Abnormal) Collected: 01/02/25 1721    Specimen: Blood Updated: 01/02/25 1754     Glucose 86 mg/dL      BUN 21 mg/dL      Creatinine 0.62 mg/dL      Sodium 140 mmol/L      Potassium 4.2 mmol/L      Chloride 99 mmol/L      CO2 36.1 mmol/L      Calcium 8.6 mg/dL      Total Protein 6.0 g/dL      Albumin 3.8 g/dL      ALT (SGPT) 48 U/L      AST (SGOT) 31 U/L      Alkaline Phosphatase 130 U/L      Total Bilirubin 0.2 mg/dL      Globulin 2.2 gm/dL      A/G Ratio 1.7 g/dL      BUN/Creatinine Ratio 33.9     Anion Gap 4.9 mmol/L      eGFR 91.9 mL/min/1.73     Narrative:      GFR Categories in Chronic Kidney Disease (CKD)      GFR Category          GFR (mL/min/1.73)    Interpretation  G1                     90 or greater         Normal or high (1)  G2                      60-89                Mild decrease (1)  G3a                   45-59                Mild to moderate decrease  G3b                   30-44                Moderate to severe decrease  G4                    15-29                Severe decrease  G5                    14 or less           Kidney failure          (1)In the absence of evidence of kidney disease, neither GFR category G1 or G2 fulfill the criteria for CKD.    eGFR calculation 2021 CKD-EPI creatinine equation, which does not include race as a factor    High Sensitivity Troponin T [[id number]]  (Abnormal) Collected: 01/02/25 1721    Specimen: Blood Updated: 01/02/25 1754     HS Troponin T 20 ng/L     Narrative:      High Sensitive Troponin T Reference Range:  <14.0 ng/L- Negative Female for AMI  <22.0 ng/L- Negative Male for AMI  >=14 - Abnormal Female indicating possible myocardial injury.  >=22 - Abnormal Male indicating  "possible myocardial injury.   Clinicians would have to utilize clinical acumen, EKG, Troponin, and serial changes to determine if it is an Acute Myocardial Infarction or myocardial injury due to an underlying chronic condition.         Magnesium [[id number]]  (Normal) Collected: 01/02/25 1721    Specimen: Blood Updated: 01/02/25 1754     Magnesium 1.8 mg/dL     CBC Auto Differential [[id number]]  (Abnormal) Collected: 01/02/25 1721    Specimen: Blood Updated: 01/02/25 1757     WBC 8.44 10*3/mm3      RBC 3.77 10*6/mm3      Hemoglobin 9.8 g/dL      Hematocrit 33.0 %      MCV 87.5 fL      MCH 26.0 pg      MCHC 29.7 g/dL      RDW 15.9 %      RDW-SD 50.2 fl      MPV 10.6 fL      Platelets 176 10*3/mm3      Neutrophil % 74.4 %      Lymphocyte % 17.8 %      Monocyte % 5.9 %      Eosinophil % 1.1 %      Basophil % 0.2 %      Immature Grans % 0.6 %      Neutrophils, Absolute 6.28 10*3/mm3      Lymphocytes, Absolute 1.50 10*3/mm3      Monocytes, Absolute 0.50 10*3/mm3      Eosinophils, Absolute 0.09 10*3/mm3      Basophils, Absolute 0.02 10*3/mm3      Immature Grans, Absolute 0.05 10*3/mm3      nRBC 0.0 /100 WBC     Procalcitonin [[id number]]  (Normal) Collected: 01/02/25 1721    Specimen: Blood Updated: 01/02/25 1820     Procalcitonin 0.07 ng/mL     Narrative:      As a Marker for Sepsis (Non-Neonates):    1. <0.5 ng/mL represents a low risk of severe sepsis and/or septic shock.  2. >2 ng/mL represents a high risk of severe sepsis and/or septic shock.    As a Marker for Lower Respiratory Tract Infections that require antibiotic therapy:    PCT on Admission    Antibiotic Therapy       6-12 Hrs later    >0.5                Strongly Recommended  >0.25 - <0.5        Recommended   0.1 - 0.25          Discouraged              Remeasure/reassess PCT  <0.1                Strongly Discouraged     Remeasure/reassess PCT    As 28 day mortality risk marker: \"Change in Procalcitonin Result\" (>80% or <=80%) if Day 0 (or Day 1) and Day 4 " values are available. Refer to http://www.[facility]-pct-calculator.com    Change in PCT <=80%  A decrease of PCT levels below or equal to 80% defines a positive change in PCT test result representing a higher risk for 28-day all-cause mortality of patients diagnosed with severe sepsis for septic shock.    Change in PCT >80%  A decrease of PCT levels of more than 80% defines a negative change in PCT result representing a lower risk for 28-day all-cause mortality of patients diagnosed with severe sepsis or septic shock.       Lactic Acid, Plasma [[id number]]  (Normal) Collected: 01/02/25 1721    Specimen: Blood Updated: 01/02/25 1751     Lactate 0.6 mmol/L     CK Total & CKMB [[id number]]  (Abnormal) Collected: 01/02/25 1721    Specimen: Blood Updated: 01/02/25 1807     CKMB 1.99 ng/mL      Creatine Kinase 19 U/L     Narrative:      CKMB results may be falsely decreased if patient taking Biotin.    Scan Slide [[id number]] Collected: 01/02/25 1721    Specimen: Blood Updated: 01/02/25 1757     Hypochromia Mod/2+     WBC Morphology Normal     Platelet Morphology Normal    Respiratory Panel PCR w/COVID-19(SARS-CoV-2) SAMMIE/NAMITA/GREGORIO/PAD/COR/KRYSTAL In-House, NP Swab in UTM/VTM, 2 HR TAT - Swab, Nasopharynx [[id number]]  (Normal) Collected: 01/02/25 1815    Specimen: Swab from Nasopharynx Updated: 01/02/25 1917     ADENOVIRUS, PCR Not Detected     Coronavirus 229E Not Detected     Coronavirus HKU1 Not Detected     Coronavirus NL63 Not Detected     Coronavirus OC43 Not Detected     COVID19 Not Detected     Human Metapneumovirus Not Detected     Human Rhinovirus/Enterovirus Not Detected     Influenza A PCR Not Detected     Influenza B PCR Not Detected     Parainfluenza Virus 1 Not Detected     Parainfluenza Virus 2 Not Detected     Parainfluenza Virus 3 Not Detected     Parainfluenza Virus 4 Not Detected     RSV, PCR Not Detected     Bordetella pertussis pcr Not Detected     Bordetella parapertussis PCR Not Detected     Chlamydophila  pneumoniae PCR Not Detected     Mycoplasma pneumo by PCR Not Detected    Narrative:      In the setting of a positive respiratory panel with a viral infection PLUS a negative procalcitonin without other underlying concern for bacterial infection, consider observing off antibiotics or discontinuation of antibiotics and continue supportive care. If the respiratory panel is positive for atypical bacterial infection (Bordetella pertussis, Chlamydophila pneumoniae, or Mycoplasma pneumoniae), consider antibiotic de-escalation to target atypical bacterial infection.    High Sensitivity Troponin T 1Hr [[id number]]  (Abnormal) Collected: 01/02/25 1816    Specimen: Blood Updated: 01/02/25 1849     HS Troponin T 18 ng/L      Troponin T Numeric Delta -2 ng/L      Troponin T % Delta -10 %     Narrative:      High Sensitive Troponin T Reference Range:  <14.0 ng/L- Negative Female for AMI  <22.0 ng/L- Negative Male for AMI  >=14 - Abnormal Female indicating possible myocardial injury.  >=22 - Abnormal Male indicating possible myocardial injury.   Clinicians would have to utilize clinical acumen, EKG, Troponin, and serial changes to determine if it is an Acute Myocardial Infarction or myocardial injury due to an underlying chronic condition.         Blood Gas, Arterial With Co-Ox [[id number]]  (Abnormal) Collected: 01/02/25 1825    Specimen: Arterial Blood Updated: 01/02/25 1825     Site Right Brachial     Glenn's Test Positive     pH, Arterial 7.365 pH units      pCO2, Arterial 66.0 mm Hg      Comment: 83 Value above reference range        pO2, Arterial 84.0 mm Hg      HCO3, Arterial 37.7 mmol/L      Comment: 83 Value above reference range        Base Excess, Arterial 10.6 mmol/L      Comment: 83 Value above reference range        O2 Saturation, Arterial 96.6 %      Hematocrit, Blood Gas 28.7 %      Comment: 84 Value below reference range        Oxyhemoglobin 94.9 %      Methemoglobin 0.80 %      Carboxyhemoglobin 0.9 %      A-a DO2  --     Comment: UNABLE TO CALCULATE        Barometric Pressure for Blood Gas 740 mmHg      Modality Nasal Cannula     Flow Rate 4.0 lpm      Ventilator Mode NA     Collected by [id number]     Comment: Meter: [id number]     :  [id number]        pH, Temp Corrected --     pCO2, Temperature Corrected --     pO2, Temperature Corrected --             CT Angiogram Chest Pulmonary Embolism    Result Date: 1/2/2025  FINAL REPORT TECHNIQUE: null CLINICAL HISTORY: Chest pain, shortness of breath, off anticoagulation recently, eval PE COMPARISON: null FINDINGS: CT angiography chest with contrast. 3D Postprocessing. Comparison: CT - CT ANGIOGRAM CHEST PULMONARY EMBOLISM - 5/1/23 23:25 EDT Findings: The prior CT examination report is not available for review. The heart size is normal. RV/LV ratio is normal. The thoracic aorta is normal caliber. No pulmonary artery filling defects. The visualized thyroid and mediastinum are unremarkable. No consolidation or effusion. Emphysema. Compression fracture of the T2, T5 and T7. Multiple chronic bilateral rib fractures.     Impression: IMPRESSION: 1. No pulmonary emboli. Additional findings as above. Authenticated and Electronically Signed by Ra Gutiérrez on 01/02/2025 07:28:33 PM    CT Cervical Spine Without Contrast    Addendum Date: 1/2/2025    ADDENDUM REPORT ADDENDUM: Atherosclerosis calcification of the carotid bulbs. Authenticated and Electronically Signed by Ra Gutiérrez on 01/02/2025 07:27:41 PM    Result Date: 1/2/2025  FINAL REPORT TECHNIQUE: null CLINICAL HISTORY: Fall, eval C-spine fracture COMPARISON: null FINDINGS: CT cervical spine without contrast Comparison: None Findings: Normal vertebral body alignment. There is degenerative change. No acute fractures or dislocations. Visualized intracranial contents are unremarkable. No cervical fluid collections or masses. Lung apices are clear.     Impression: IMPRESSION: No acute findings. Authenticated and Electronically  Signed by Ra Gutiérrez on 01/02/2025 06:51:38 PM    CT Abdomen Pelvis With Contrast    Result Date: 1/2/2025  FINAL REPORT TECHNIQUE: null CLINICAL HISTORY: Diarrhea, vomiting, fall, hip pain, eval colitis or hip fracture COMPARISON: null FINDINGS: CT abdomen and pelvis with contrast Comparison: CT - CT ABDOMEN PELVIS WO CONTRAST - 6/13/24 18:26 EDT Findings: The prior CT report is not available for review. Emphysema. Chronic right rib fractures. The liver is normal in size without suspicious focal hepatic lesions. Hypodensity in the liver dome is likely to be a cyst. No intrahepatic or extrahepatic ductal dilatation is seen. The hepatic and portal veins are patent. Cholecystectomy. Pancreas, spleen are normal in appearance. 3 cm left adrenal nodule is likely to be an adenoma. There are bilateral renal cysts. 5 cm right renal cyst with rim calcification. No hydronephrosis or calculi. The abdominal aorta demonstrates no evidence of aneurysmal dilatation or dissection. Atherosclerosis calcification. No bowel obstruction. Appendix is not visualized. Colonic diverticulosis with bowel wall thickening of the sigmoid colon. Post hysterectomy. No intraperitoneal free air or fluid is visualized. No pathologic lymphadenopathy is seen. Bowel loop containing supraumbilical ventral hernia. Severe compression fracture of the L3 vertebral body.     Impression: IMPRESSION: Colonic diverticulosis with bowel wall thickening of the sigmoid colon concerning for acute diverticulitis. Possible left adrenal adenoma. Complex cyst of the right kidney. Ultrasound follow-up as indicated. Bowel loop containing supraumbilical ventral hernia with no evidence of bowel obstruction. Severe compression fracture of the L3. Additional findings as above. Authenticated and Electronically Signed by Ra Gutiérrez on 01/02/2025 07:22:32 PM    CT Lumbar Spine Without Contrast    Addendum Date: 1/2/2025    ADDENDUM REPORT ADDENDUM: This report was discussed  with Pierce Redmond MD on Jan 02, 2025 19:12:00 EST. Authenticated and Electronically Signed by Ra Gutiérrez on 01/02/2025 07:12:50 PM    Result Date: 1/2/2025  FINAL REPORT TECHNIQUE: null CLINICAL HISTORY: Fall, eval L-spine fracture COMPARISON: null FINDINGS: CT lumbar spine without contrast Comparison: CT - CT LUMBAR SPINE WO CONTRAST - 6/13/24 18:26 EDT Findings: Vertebral alignment is within normal limits. Severe compression fracture of the L3 vertebral body with retropulsion of the fragment into the spinal canal. Severe spinal stenosis at the level. Multilevel degenerative change. Right renal cyst.     Impression: IMPRESSION: Severe (near complete) compression fracture of the L3 vertebral body with interval progression. There is retropulsion of the fracture fragment into the spinal canal with severe spinal stenosis. Further evaluation by MRI is recommended as indicated to evaluate for cord compression. Authenticated and Electronically Signed by Ra Gutiérrez on 01/02/2025 07:09:44 PM    CT Thoracic Spine Without Contrast    Result Date: 1/2/2025  FINAL REPORT TECHNIQUE: null CLINICAL HISTORY: Fall, eval T-spine fracture COMPARISON: null FINDINGS: CT thoracic spine without contrast Comparison: CT - CT ANGIOGRAM CHEST PULMONARY EMBOLISM - 5/1/23 23:25 EDT Findings: Normal vertebral body alignment. Mild compression fracture of the T2. Severe compression fracture of the T5 and T7 vertebral bodies. No significant degenerative change. Emphysema. Upper abdominal contents unremarkable.     Impression: IMPRESSION: New mild compression fracture of the T2. Unchanged severe compression fracture of the T5 and T7 vertebral bodies. Authenticated and Electronically Signed by Ra Gutiérrez on 01/02/2025 07:00:52 PM    CT Head Without Contrast    Result Date: 1/2/2025  FINAL REPORT TECHNIQUE: null CLINICAL HISTORY: Fall, dizziness, eval intracranial hemorrhage COMPARISON: null FINDINGS: CT Brain without contrast Comparison:  None FINDINGS: Cortical sulci: There is diffuse prominence of the cortical sulci compatible with age-related atrophy. Ventricles: Normal for age Brain parenchyma: There is patchy lucency throughout the deep white matter indicating chronic microvascular leukomalacia. Extra axial spaces: Normal Posterior Fossa: Normal Extracranial soft tissues: Normal Additional abnormality: None     Impression: IMPRESSION: Age-related atrophy with chronic microvascular leukomalacia. No hemorrhage, mass effect, or acute findings identified. Authenticated and Electronically Signed by Ra Gutiérrez on 01/02/2025 06:49:26 PM        Joint [facility]      Initial impression of presenting illness: Patient is a 79-year-old female, with recent admission to this facility for COPD exacerbation, presenting today for evaluation of multiple falls    DDX: includes but is not limited to: Muscular deconditioning, intracranial abnormality, CVA, subacute CVA, electrolyte imbalance, arrhythmia, fracture, dislocation, strain sprain contusion, respiratory failure, COPD exacerbation, pneumonia, viral illness, viral upper respiratory tract infection, dehydration, urinary tract infection, encephalopathy, sepsis, rhabdomyolysis, NSTEMI, STEMI, others    Patient arrives hemodynamically stable, nontachycardic, nontachypneic, 95% on baseline oxygen requirement via nasal cannula with vitals interpreted by myself.     Pertinent features from physical exam: Chronically ill-appearing 79-year-old female, abdomen soft nonreactive, cranial nerves II through XII are grossly intact without focal deficit noted, her smile is symmetric, speech is normal, she is able to set up at bedside, generalized weakness present without focal deficit.  No hip or pelvic instability.  She does have mild thoracic and lumbar spine tenderness palpated without step-off or deformities.  No midline cervical spine tenderness, head appears atraumatic, pupils PERRLA, speech is normal, lungs grossly clear  except for decreased lung sounds in the upper lobes, cardiac auscultation regular and rhythm.    Initial diagnostic plan: CBC CMP magnesium troponin urinalysis respiratory panel Pro-Aung lactic acid CK total and CK-MB EKG CT head cervical thoracic and lumbar spine without contrast, CT chest PE and CT abdomen pelvis with contrast    Results from initial plan were reviewed and interpreted by me revealing CT scans with T2 compression deformity, L3 near-complete compression fracture, CT abdomen concerning for acute diverticulitis per radiology.  ABG with hypercarbia, appears compensated, respiratory panel negative, troponin downtrended by 2 ACS not suspected, blood cultures x 2 pending.  CK MB and creatinine kinase within normal limits, CBC stable anemia.  CMP nonactionable.  Lactic acid normal.  Sepsis not suspected.    Diagnostic information from other sources: Prior records reviewed    Interventions / Re-evaluation:   Medications   sodium chloride 0.9 % flush 10 mL (has no administration in time range)   cefTRIAXone (ROCEPHIN) 2,000 mg in sodium chloride 0.9 % 100 mL IVPB-VTB (has no administration in time range)   metroNIDAZOLE (FLAGYL) IVPB 500 mg (has no administration in time range)   HYDROmorphone (DILAUDID) injection 0.5 mg (has no administration in time range)   ondansetron (ZOFRAN) injection 4 mg (4 mg Intravenous Given 1/2/25 1753)   acetaminophen (TYLENOL) tablet 975 mg (975 mg Oral Given 1/2/25 1753)   iopamidol (ISOVUE-300) 61 % injection 100 mL (100 mL Intravenous Given 1/2/25 1811)   ipratropium-albuterol (DUO-NEB) nebulizer solution 3 mL (3 mL Nebulization Given 1/2/25 1921)   dexAMETHasone sodium phosphate injection 10 mg (10 mg Intravenous Given 1/2/25 1854)       Results/clinical rationale were discussed with patient at bedside, patient and family agreeable to transfer.  Patient did have chronic T5 T7 compression fractures but new T2 compression fracture and significant acute worsening of L-spine  compression fracture, given this likely in the setting of traumatic injury and fall, recommended transfer to [facility] for evaluation by trauma/neurosurgery if indicated.  Family was agreeable to transfer to higher level of care with specialist availability.     Consultations/Discussion of results with other physicians: Discussed with ED attending physician, discussed with  transfer physician, Dr. Grayson, agreeable to transfer patient to  emergency department [facility] in [facility].    Disposition plan: Transfer to [facility] emergency department  -----    Final diagnoses:   Recurrent falls   Chronic respiratory failure with hypercapnia   Anemia, unspecified type   Renal mass   Acute diverticulitis   Compression fracture of T2 vertebra, initial encounter   Closed compression fracture of L3 vertebra, initial encounter   Compression fracture of T5 vertebra, initial encounter   Compression fracture of T7 vertebra, initial encounter          Brady Redmond PA-C  01/02/25 2001

## 2025-01-07 LAB
BACTERIA SPEC AEROBE CULT: NORMAL
BACTERIA SPEC AEROBE CULT: NORMAL

## 2025-02-03 DIAGNOSIS — J30.1 NON-SEASONAL ALLERGIC RHINITIS DUE TO POLLEN: ICD-10-CM

## 2025-02-03 RX ORDER — CETIRIZINE HYDROCHLORIDE 10 MG/1
10 TABLET ORAL DAILY
Qty: 90 TABLET | Refills: 3 | OUTPATIENT
Start: 2025-02-03

## 2025-02-11 DIAGNOSIS — I10 ESSENTIAL HYPERTENSION: ICD-10-CM

## 2025-02-11 RX ORDER — VALSARTAN 40 MG/1
40 TABLET ORAL DAILY
Qty: 90 TABLET | Refills: 1 | Status: SHIPPED | OUTPATIENT
Start: 2025-02-11

## 2025-02-11 RX ORDER — METOPROLOL SUCCINATE 25 MG/1
25 TABLET, EXTENDED RELEASE ORAL
Qty: 90 TABLET | Refills: 1 | Status: SHIPPED | OUTPATIENT
Start: 2025-02-11

## 2025-02-12 RX ORDER — AMIODARONE HYDROCHLORIDE 200 MG/1
200 TABLET ORAL
Qty: 90 TABLET | Refills: 3 | Status: SHIPPED | OUTPATIENT
Start: 2025-02-12

## 2025-02-13 ENCOUNTER — OFFICE VISIT (OUTPATIENT)
Dept: INTERNAL MEDICINE | Facility: CLINIC | Age: 80
End: 2025-02-13
Payer: MEDICARE

## 2025-02-13 VITALS
BODY MASS INDEX: 21.33 KG/M2 | SYSTOLIC BLOOD PRESSURE: 132 MMHG | RESPIRATION RATE: 20 BRPM | DIASTOLIC BLOOD PRESSURE: 68 MMHG | WEIGHT: 128 LBS | HEART RATE: 81 BPM | HEIGHT: 65 IN | OXYGEN SATURATION: 99 %

## 2025-02-13 DIAGNOSIS — R11.0 NAUSEA: ICD-10-CM

## 2025-02-13 DIAGNOSIS — F41.9 ANXIETY: ICD-10-CM

## 2025-02-13 DIAGNOSIS — J18.9 COMMUNITY ACQUIRED PNEUMONIA, UNSPECIFIED LATERALITY: Primary | ICD-10-CM

## 2025-02-13 DIAGNOSIS — M89.9 BONE LESION: ICD-10-CM

## 2025-02-13 DIAGNOSIS — F51.01 PRIMARY INSOMNIA: ICD-10-CM

## 2025-02-13 DIAGNOSIS — Z09 HOSPITAL DISCHARGE FOLLOW-UP: ICD-10-CM

## 2025-02-13 DIAGNOSIS — E53.8 B12 DEFICIENCY: ICD-10-CM

## 2025-02-13 RX ORDER — METHOCARBAMOL 500 MG/1
500 TABLET, FILM COATED ORAL 4 TIMES DAILY
COMMUNITY
Start: 2025-01-07

## 2025-02-13 RX ORDER — QUETIAPINE FUMARATE 25 MG/1
25 TABLET, FILM COATED ORAL NIGHTLY
Qty: 90 TABLET | Refills: 0 | Status: SHIPPED | OUTPATIENT
Start: 2025-02-13

## 2025-02-13 RX ORDER — AZITHROMYCIN 250 MG/1
TABLET, FILM COATED ORAL
Qty: 6 TABLET | Refills: 0 | Status: SHIPPED | OUTPATIENT
Start: 2025-02-13

## 2025-02-13 RX ORDER — CLONAZEPAM 1 MG/1
1 TABLET ORAL 2 TIMES DAILY PRN
Qty: 180 TABLET | Refills: 0 | Status: SHIPPED | OUTPATIENT
Start: 2025-02-13

## 2025-02-13 RX ORDER — PROCHLORPERAZINE MALEATE 5 MG/1
5 TABLET ORAL EVERY 6 HOURS PRN
Qty: 30 TABLET | Refills: 0 | Status: SHIPPED | OUTPATIENT
Start: 2025-02-13

## 2025-02-27 NOTE — PROGRESS NOTES
Subjective   Gabi Dudley is a 79 y.o. female.     History of Present Illness  Presents for hospital follow up;.  Was seen for lumbar fracture. Went to inpatient rehab but signed out because they were not please with the care. She has been doing well at home.   Has been coughing with green sputum production and soa increased        The following portions of the patient's history were reviewed and updated as appropriate: allergies, current medications, past family history, past medical history, past social history, past surgical history, and problem list.    Review of Systems   All other systems reviewed and are negative.      Objective   Physical Exam  Vitals and nursing note reviewed.   Constitutional:       Appearance: Normal appearance.   HENT:      Head: Normocephalic and atraumatic.      Right Ear: External ear normal.      Left Ear: External ear normal.      Nose: Nose normal.      Mouth/Throat:      Mouth: Mucous membranes are moist.      Pharynx: Oropharynx is clear. No oropharyngeal exudate or posterior oropharyngeal erythema.   Eyes:      Extraocular Movements: Extraocular movements intact.      Conjunctiva/sclera: Conjunctivae normal.      Pupils: Pupils are equal, round, and reactive to light.   Cardiovascular:      Rate and Rhythm: Regular rhythm.      Pulses: Normal pulses.      Heart sounds: Normal heart sounds.   Pulmonary:      Effort: Pulmonary effort is normal.   Abdominal:      General: Abdomen is flat. Bowel sounds are normal.      Palpations: Abdomen is soft.   Musculoskeletal:         General: Normal range of motion.      Cervical back: Normal range of motion.   Skin:     General: Skin is warm.      Capillary Refill: Capillary refill takes less than 2 seconds.   Neurological:      General: No focal deficit present.      Mental Status: She is alert and oriented to person, place, and time. Mental status is at baseline.   Psychiatric:         Mood and Affect: Mood normal.         Behavior:  Behavior normal.         Thought Content: Thought content normal.         Judgment: Judgment normal.         Assessment & Plan   Diagnoses and all orders for this visit:    1. Community acquired pneumonia, unspecified laterality (Primary)  -     XR Chest PA & Lateral; Future  -     amoxicillin-clavulanate (AUGMENTIN) 875-125 MG per tablet; Take 1 tablet by mouth 2 (Two) Times a Day.  Dispense: 20 tablet; Refill: 0  -     azithromycin (Zithromax Z-Demian) 250 MG tablet; Take 2 tablets by mouth on day 1, then 1 tablet daily on days 2-5  Dispense: 6 tablet; Refill: 0    2. Bone lesion    3. Hospital discharge follow-up    4. Anxiety  -     QUEtiapine (SEROquel) 25 MG tablet; Take 1 tablet by mouth Every Night.  Dispense: 90 tablet; Refill: 0  -     clonazePAM (KlonoPIN) 1 MG tablet; Take 1 tablet by mouth 2 (Two) Times a Day As Needed for Anxiety.  Dispense: 180 tablet; Refill: 0    5. Primary insomnia  -     QUEtiapine (SEROquel) 25 MG tablet; Take 1 tablet by mouth Every Night.  Dispense: 90 tablet; Refill: 0    6. Nausea  -     prochlorperazine (COMPAZINE) 5 MG tablet; Take 1 tablet by mouth Every 6 (Six) Hours As Needed for Nausea or Vomiting.  Dispense: 30 tablet; Refill: 0    7. B12 deficiency  -     Vitamin B12

## 2025-03-20 DIAGNOSIS — I48.0 PAROXYSMAL ATRIAL FIBRILLATION: ICD-10-CM

## 2025-03-20 NOTE — TELEPHONE ENCOUNTER
Pts daughter called asking for samples of Eliquis while waiting on the processing time for patient assistance.  LOT: [id number]  EXP: 2/2026  2 BOXES GIVEN

## 2025-04-09 ENCOUNTER — APPOINTMENT (OUTPATIENT)
Dept: GENERAL RADIOLOGY | Facility: HOSPITAL | Age: 80
End: 2025-04-09
Payer: MEDICARE

## 2025-04-09 ENCOUNTER — HOSPITAL ENCOUNTER (EMERGENCY)
Facility: HOSPITAL | Age: 80
Discharge: HOME | End: 2025-04-10
Attending: STUDENT IN AN ORGANIZED HEALTH CARE EDUCATION/TRAINING PROGRAM
Payer: MEDICARE

## 2025-04-09 DIAGNOSIS — J44.1 COPD WITH ACUTE EXACERBATION: Primary | ICD-10-CM

## 2025-04-09 LAB
ALBUMIN SERPL-MCNC: 4 G/DL (ref 3.5–5.2)
ALBUMIN/GLOB SERPL: 1.5 G/DL
ALP SERPL-CCNC: 99 U/L (ref 39–117)
ALT SERPL W P-5'-P-CCNC: 46 U/L (ref 1–33)
ANION GAP SERPL CALCULATED.3IONS-SCNC: 7 MMOL/L (ref 5–15)
AST SERPL-CCNC: 65 U/L (ref 1–32)
BASOPHILS # BLD AUTO: 0.03 10*3/MM3 (ref 0–0.2)
BASOPHILS NFR BLD AUTO: 0.6 % (ref 0–1.5)
BILIRUB SERPL-MCNC: 0.3 MG/DL (ref 0–1.2)
BUN SERPL-MCNC: 16 MG/DL (ref 8–23)
BUN/CREAT SERPL: 27.1 (ref 7–25)
CALCIUM SPEC-SCNC: 9.3 MG/DL (ref 8.6–10.5)
CHLORIDE SERPL-SCNC: 98 MMOL/L (ref 98–107)
CO2 SERPL-SCNC: 38 MMOL/L (ref 22–29)
CREAT SERPL-MCNC: 0.59 MG/DL (ref 0.57–1)
CRP SERPL-MCNC: 1.14 MG/DL (ref 0–0.5)
DEPRECATED RDW RBC AUTO: 48.1 FL (ref 37–54)
EGFRCR SERPLBLD CKD-EPI 2021: 92.4 ML/MIN/1.73
EOSINOPHIL # BLD AUTO: 0.03 10*3/MM3 (ref 0–0.4)
EOSINOPHIL NFR BLD AUTO: 0.6 % (ref 0.3–6.2)
ERYTHROCYTE [DISTWIDTH] IN BLOOD BY AUTOMATED COUNT: 15 % (ref 12.3–15.4)
FLUAV SUBTYP SPEC NAA+PROBE: NOT DETECTED
FLUBV RNA ISLT QL NAA+PROBE: NOT DETECTED
GEN 5 1HR TROPONIN T REFLEX: 14 NG/L
GLOBULIN UR ELPH-MCNC: 2.7 GM/DL
GLUCOSE SERPL-MCNC: 113 MG/DL (ref 65–99)
HCT VFR BLD AUTO: 37 % (ref 34–46.6)
HGB BLD-MCNC: 11 G/DL (ref 12–15.9)
HOLD SPECIMEN: NORMAL
HOLD SPECIMEN: NORMAL
IMM GRANULOCYTES # BLD AUTO: 0.03 10*3/MM3 (ref 0–0.05)
IMM GRANULOCYTES NFR BLD AUTO: 0.6 % (ref 0–0.5)
LYMPHOCYTES # BLD AUTO: 1.2 10*3/MM3 (ref 0.7–3.1)
LYMPHOCYTES NFR BLD AUTO: 22.1 % (ref 19.6–45.3)
MAGNESIUM SERPL-MCNC: 1.9 MG/DL (ref 1.6–2.4)
MCH RBC QN AUTO: 26.3 PG (ref 26.6–33)
MCHC RBC AUTO-ENTMCNC: 29.7 G/DL (ref 31.5–35.7)
MCV RBC AUTO: 88.3 FL (ref 79–97)
MONOCYTES # BLD AUTO: 0.3 10*3/MM3 (ref 0.1–0.9)
MONOCYTES NFR BLD AUTO: 5.5 % (ref 5–12)
NEUTROPHILS NFR BLD AUTO: 3.84 10*3/MM3 (ref 1.7–7)
NEUTROPHILS NFR BLD AUTO: 70.6 % (ref 42.7–76)
NRBC BLD AUTO-RTO: 0 /100 WBC (ref 0–0.2)
NT-PROBNP SERPL-MCNC: 2479 PG/ML (ref 0–1800)
PLATELET # BLD AUTO: 143 10*3/MM3 (ref 140–450)
PMV BLD AUTO: 10.7 FL (ref 6–12)
POTASSIUM SERPL-SCNC: 4.3 MMOL/L (ref 3.5–5.2)
PROCALCITONIN SERPL-MCNC: 0.06 NG/ML (ref 0–0.25)
PROT SERPL-MCNC: 6.7 G/DL (ref 6–8.5)
RBC # BLD AUTO: 4.19 10*6/MM3 (ref 3.77–5.28)
SARS-COV-2 RNA RESP QL NAA+PROBE: NOT DETECTED
SODIUM SERPL-SCNC: 143 MMOL/L (ref 136–145)
TROPONIN T % DELTA: -7
TROPONIN T NUMERIC DELTA: -1 NG/L
TROPONIN T SERPL HS-MCNC: 15 NG/L
WBC NRBC COR # BLD AUTO: 5.43 10*3/MM3 (ref 3.4–10.8)
WHOLE BLOOD HOLD COAG: NORMAL
WHOLE BLOOD HOLD SPECIMEN: NORMAL

## 2025-04-09 PROCEDURE — 84145 PROCALCITONIN (PCT): CPT | Performed by: STUDENT IN AN ORGANIZED HEALTH CARE EDUCATION/TRAINING PROGRAM

## 2025-04-09 PROCEDURE — 96375 TX/PRO/DX INJ NEW DRUG ADDON: CPT

## 2025-04-09 PROCEDURE — 86140 C-REACTIVE PROTEIN: CPT | Performed by: STUDENT IN AN ORGANIZED HEALTH CARE EDUCATION/TRAINING PROGRAM

## 2025-04-09 PROCEDURE — 83735 ASSAY OF MAGNESIUM: CPT | Performed by: STUDENT IN AN ORGANIZED HEALTH CARE EDUCATION/TRAINING PROGRAM

## 2025-04-09 PROCEDURE — 83880 ASSAY OF NATRIURETIC PEPTIDE: CPT | Performed by: STUDENT IN AN ORGANIZED HEALTH CARE EDUCATION/TRAINING PROGRAM

## 2025-04-09 PROCEDURE — 87636 SARSCOV2 & INF A&B AMP PRB: CPT | Performed by: STUDENT IN AN ORGANIZED HEALTH CARE EDUCATION/TRAINING PROGRAM

## 2025-04-09 PROCEDURE — 71045 X-RAY EXAM CHEST 1 VIEW: CPT

## 2025-04-09 PROCEDURE — 94640 AIRWAY INHALATION TREATMENT: CPT

## 2025-04-09 PROCEDURE — 93005 ELECTROCARDIOGRAM TRACING: CPT | Performed by: STUDENT IN AN ORGANIZED HEALTH CARE EDUCATION/TRAINING PROGRAM

## 2025-04-09 PROCEDURE — 25010000002 MAGNESIUM SULFATE 2 GM/50ML SOLUTION: Performed by: STUDENT IN AN ORGANIZED HEALTH CARE EDUCATION/TRAINING PROGRAM

## 2025-04-09 PROCEDURE — 85025 COMPLETE CBC W/AUTO DIFF WBC: CPT | Performed by: STUDENT IN AN ORGANIZED HEALTH CARE EDUCATION/TRAINING PROGRAM

## 2025-04-09 PROCEDURE — 25010000002 DEXAMETHASONE SODIUM PHOSPHATE 10 MG/ML SOLUTION: Performed by: STUDENT IN AN ORGANIZED HEALTH CARE EDUCATION/TRAINING PROGRAM

## 2025-04-09 PROCEDURE — 80053 COMPREHEN METABOLIC PANEL: CPT | Performed by: STUDENT IN AN ORGANIZED HEALTH CARE EDUCATION/TRAINING PROGRAM

## 2025-04-09 PROCEDURE — 84484 ASSAY OF TROPONIN QUANT: CPT | Performed by: STUDENT IN AN ORGANIZED HEALTH CARE EDUCATION/TRAINING PROGRAM

## 2025-04-09 PROCEDURE — 96365 THER/PROPH/DIAG IV INF INIT: CPT

## 2025-04-09 PROCEDURE — 99284 EMERGENCY DEPT VISIT MOD MDM: CPT | Performed by: STUDENT IN AN ORGANIZED HEALTH CARE EDUCATION/TRAINING PROGRAM

## 2025-04-09 PROCEDURE — 25010000002 FUROSEMIDE PER 20 MG: Performed by: STUDENT IN AN ORGANIZED HEALTH CARE EDUCATION/TRAINING PROGRAM

## 2025-04-09 RX ORDER — SODIUM CHLORIDE 0.9 % (FLUSH) 0.9 %
10 SYRINGE (ML) INJECTION AS NEEDED
Status: DISCONTINUED | OUTPATIENT
Start: 2025-04-09 | End: 2025-04-10 | Stop reason: HOSPADM

## 2025-04-09 RX ORDER — MAGNESIUM SULFATE HEPTAHYDRATE 40 MG/ML
2 INJECTION, SOLUTION INTRAVENOUS ONCE
Status: COMPLETED | OUTPATIENT
Start: 2025-04-09 | End: 2025-04-09

## 2025-04-09 RX ORDER — IPRATROPIUM BROMIDE AND ALBUTEROL SULFATE 2.5; .5 MG/3ML; MG/3ML
3 SOLUTION RESPIRATORY (INHALATION) ONCE
Status: COMPLETED | OUTPATIENT
Start: 2025-04-09 | End: 2025-04-09

## 2025-04-09 RX ORDER — DEXAMETHASONE SODIUM PHOSPHATE 10 MG/ML
10 INJECTION, SOLUTION INTRAMUSCULAR; INTRAVENOUS ONCE
Status: COMPLETED | OUTPATIENT
Start: 2025-04-09 | End: 2025-04-09

## 2025-04-09 RX ORDER — FUROSEMIDE 10 MG/ML
80 INJECTION INTRAMUSCULAR; INTRAVENOUS ONCE
Status: COMPLETED | OUTPATIENT
Start: 2025-04-09 | End: 2025-04-09

## 2025-04-09 RX ADMIN — MAGNESIUM SULFATE HEPTAHYDRATE 2 G: 40 INJECTION, SOLUTION INTRAVENOUS at 21:53

## 2025-04-09 RX ADMIN — DEXAMETHASONE SODIUM PHOSPHATE 10 MG: 10 INJECTION INTRAMUSCULAR; INTRAVENOUS at 21:46

## 2025-04-09 RX ADMIN — FUROSEMIDE 80 MG: 10 INJECTION, SOLUTION INTRAMUSCULAR; INTRAVENOUS at 23:03

## 2025-04-09 RX ADMIN — IPRATROPIUM BROMIDE AND ALBUTEROL SULFATE 3 ML: 2.5; .5 SOLUTION RESPIRATORY (INHALATION) at 21:51

## 2025-04-09 NOTE — Clinical Note
[facility] EMERGENCY DEPARTMENT  [address] [address]  Phone: [phone]    Gabi Dudley was seen and treated in our emergency department on 4/9/2025.  She may return to work on 04/14/2025.         Thank you for choosing [facility].    John Norton MD

## 2025-04-10 VITALS
WEIGHT: 130 LBS | RESPIRATION RATE: 18 BRPM | OXYGEN SATURATION: 98 % | HEIGHT: 65 IN | HEART RATE: 70 BPM | TEMPERATURE: 98.2 F | SYSTOLIC BLOOD PRESSURE: 120 MMHG | DIASTOLIC BLOOD PRESSURE: 70 MMHG | BODY MASS INDEX: 21.66 KG/M2

## 2025-04-10 DIAGNOSIS — I48.0 PAROXYSMAL ATRIAL FIBRILLATION: ICD-10-CM

## 2025-04-10 LAB
BILIRUB UR QL STRIP: NEGATIVE
CLARITY UR: CLEAR
COLOR UR: YELLOW
GLUCOSE UR STRIP-MCNC: NEGATIVE MG/DL
HGB UR QL STRIP.AUTO: NEGATIVE
KETONES UR QL STRIP: NEGATIVE
LEUKOCYTE ESTERASE UR QL STRIP.AUTO: NEGATIVE
NITRITE UR QL STRIP: NEGATIVE
PH UR STRIP.AUTO: 7.5 [PH] (ref 5–8)
PROT UR QL STRIP: NEGATIVE
SP GR UR STRIP: <=1.005 (ref 1–1.03)
UROBILINOGEN UR QL STRIP: NORMAL

## 2025-04-10 PROCEDURE — 81003 URINALYSIS AUTO W/O SCOPE: CPT | Performed by: STUDENT IN AN ORGANIZED HEALTH CARE EDUCATION/TRAINING PROGRAM

## 2025-04-10 RX ORDER — FUROSEMIDE 20 MG/1
20 TABLET ORAL DAILY
Qty: 2 TABLET | Refills: 0 | Status: SHIPPED | OUTPATIENT
Start: 2025-04-10 | End: 2025-04-12

## 2025-04-10 RX ORDER — PREDNISONE 20 MG/1
40 TABLET ORAL DAILY
Qty: 10 TABLET | Refills: 0 | Status: SHIPPED | OUTPATIENT
Start: 2025-04-10 | End: 2025-04-15

## 2025-04-10 RX ORDER — AZITHROMYCIN 250 MG/1
TABLET, FILM COATED ORAL
Qty: 6 TABLET | Refills: 0 | Status: SHIPPED | OUTPATIENT
Start: 2025-04-10

## 2025-04-10 NOTE — ED PROVIDER NOTES
Subjective:  History of Present Illness:    Patient is a 80-year-old female with history of COPD on 4 L nasal cannula at all times, atrial fibrillation, CAD, fibromyalgia, hyperlipidemia, GERD, anxiety, presents today with shortness of breath.  Reports increasing cough congestion shortness of breath over the last 2 days.  States she is coughing up thick yellow sputum.  Denies any fevers.  No nausea vomiting or diarrhea.  Denies any new leg swelling or leg pain.  Does endorse some dysuria.      Nurses Notes reviewed and agree, including vitals, allergies, social history and prior medical history.     REVIEW OF SYSTEMS: All systems reviewed and not pertinent unless noted.  Review of Systems   Constitutional:  Positive for activity change, chills and fatigue. Negative for appetite change and fever.   HENT:  Positive for congestion and rhinorrhea. Negative for sinus pressure and sinus pain.    Eyes:  Negative for discharge and itching.   Respiratory:  Positive for cough, shortness of breath and stridor.    Cardiovascular:  Negative for chest pain and leg swelling.   Gastrointestinal:  Negative for abdominal distention, abdominal pain, diarrhea, nausea and vomiting.   Endocrine: Negative for cold intolerance and heat intolerance.   Genitourinary:  Positive for dysuria. Negative for decreased urine volume, difficulty urinating, flank pain, frequency, urgency, vaginal bleeding, vaginal discharge and vaginal pain.   Musculoskeletal:  Negative for gait problem, neck pain and neck stiffness.   Skin:  Negative for color change.   Allergic/Immunologic: Negative for environmental allergies.   Neurological:  Negative for seizures, syncope, facial asymmetry and speech difficulty.   Psychiatric/Behavioral:  Negative for self-injury and suicidal ideas.        Past Medical History:   Diagnosis Date    Adrenal adenoma     Anemia     Arrhythmia     Asthma     Atrial fibrillation     Back pain     Benign colonic polyp     Benign tumor  of adrenal gland     Cataract     bilateral    Cholelithiasis     Chronic bronchitis     Chronic bronchitis with COPD (chronic obstructive pulmonary disease)     COPD (chronic obstructive pulmonary disease) 2005    Coronary artery disease     Depression     Diverticulosis Years ago    Elevated cholesterol     Environmental and seasonal allergies     Fibromyalgia     Fibromyalgia, primary Sometime in the 90s    Gastritis     Generalized anxiety disorder     GERD (gastroesophageal reflux disease)     H/O mammogram     Headache Years ago    Hemorrhoids     History of blood transfusion 1985    History of blood transfusion 1985    History of echocardiogram     History of endometriosis     History of nuclear stress test     Hospitalization or health care facility admission within last 6 months     5 times between 11/2022-05/2023    Hypertension     IBS (irritable bowel syndrome)     Impaired functional mobility, balance, gait, and endurance     Impaired mobility     Inverted nipple     Kidney disease     Kidney stone     Liver cyst     Low back pain Years ago    Nodular radiologic density     Nodule of left lung     NSTEMI (non-ST elevated myocardial infarction) 9/24/2024    On home oxygen therapy     2 liters NC QHS    Osteoarthritis     Osteopenia Several years ago    Osteoporosis     PONV (postoperative nausea and vomiting)     Renal cyst     Sinus problem     2014    Sinusitis     Skin cancer     basal cell carcinoma    SOB (shortness of breath)     Tobacco use     Urinary frequency     Urinary tract infection Years ago    Frequent UTIs    Vitamin D deficiency     Wears glasses     Wears partial dentures     upper plate       Allergies:    Ativan [lorazepam] and Doxycycline      Past Surgical History:   Procedure Laterality Date    APPENDECTOMY  1980s    CARDIAC CATHETERIZATION  2003    CATARACT EXTRACTION  2013    both eyes    CHOLECYSTECTOMY  2020    CHOLECYSTECTOMY WITH INTRAOPERATIVE CHOLANGIOGRAM N/A 10/30/2020     Procedure: CHOLECYSTECTOMY LAPAROSCOPIC INTRAOPERATIVE CHOLANGIOGRAPHY;  Surgeon: Sima Moses MD;  Location: [facility] OR;  Service: General;  Laterality: N/A;    COLON SURGERY      COLONOSCOPY  2013    COLONOSCOPY  2016    COLONOSCOPY N/A 2019    Procedure: COLONOSCOPY W/ COLD FORCEP POLYPECTOMIES; HOT SNARE POLYPECTOMIES; COLD SNARE POLYPECTOMY;  Surgeon: Goyo Nunez MD;  Location: [facility] ENDOSCOPY;  Service: Gastroenterology    COLONOSCOPY W/ BIOPSIES AND POLYPECTOMY      EXPLORATORY LAPAROTOMY N/A 2020    Procedure: colectomy, right, closure of enterotomy x 2, reduction of internal volvulus;  Surgeon: Sima Mosse MD;  Location: [facility] OR;  Service: General;  Laterality: N/A;    EXPLORATORY LAPAROTOMY N/A 2023    Procedure: LAPAROTOMY EXPLORATORY WITH LYIS OF ADHESIONS AND  CENTRAL LINE INSERTION;  Surgeon: Bianca Isaac DO;  Location: [facility] OR;  Service: General;  Laterality: N/A;    HYSTERECTOMY  1980s    partial    LYSIS OF ABDOMINAL ADHESIONS      TONSILLECTOMY      UPPER GASTROINTESTINAL ENDOSCOPY  2015    VAGINAL DELIVERY      x2         Social History     Socioeconomic History    Marital status:    Tobacco Use    Smoking status: Former     Current packs/day: 0.00     Average packs/day: 0.3 packs/day for 30.0 years (7.5 ttl pk-yrs)     Types: Cigarettes     Start date: 1990     Quit date: 2020     Years since quittin.4     Passive exposure: Past    Smokeless tobacco: Never   Vaping Use    Vaping status: Never Used   Substance and Sexual Activity    Alcohol use: No    Drug use: No    Sexual activity: Not Currently     Birth control/protection: Post-menopausal         Family History   Problem Relation Age of Onset    Stomach cancer Brother     Colon cancer Maternal Aunt     Brain cancer Father     Arthritis Father     Hypertension Father     Cancer Father         Father, brother, and aunt    Lung cancer Paternal Grandfather   "   Heart disease Mother         Mother passed away due to heart disease    Breast cancer Neg Hx     Ovarian cancer Neg Hx        Objective  Physical Exam:  /70 (BP Location: Left arm, Patient Position: Sitting)   Pulse 70   Temp 98.2 °F (36.8 °C) (Oral)   Resp 18   Ht 165.1 cm (65\")   Wt 59 kg (130 lb)   SpO2 98%   BMI 21.63 kg/m²      Physical Exam  Constitutional:       General: She is not in acute distress.     Appearance: Normal appearance. She is obese. She is not ill-appearing.   HENT:      Head: Normocephalic and atraumatic.      Nose: Nose normal. No congestion or rhinorrhea.      Mouth/Throat:      Mouth: Mucous membranes are dry.      Pharynx: Oropharynx is clear. No oropharyngeal exudate or posterior oropharyngeal erythema.   Eyes:      Extraocular Movements: Extraocular movements intact.      Conjunctiva/sclera: Conjunctivae normal.      Pupils: Pupils are equal, round, and reactive to light.   Cardiovascular:      Rate and Rhythm: Normal rate and regular rhythm.      Pulses: Normal pulses.      Heart sounds: Normal heart sounds.   Pulmonary:      Effort: Pulmonary effort is normal. No tachypnea, accessory muscle usage or respiratory distress.      Breath sounds: Normal breath sounds. No wheezing.   Abdominal:      General: Abdomen is flat. Bowel sounds are normal. There is no distension.      Palpations: Abdomen is soft.      Tenderness: There is no abdominal tenderness.   Musculoskeletal:         General: No swelling or tenderness. Normal range of motion.      Cervical back: Normal range of motion and neck supple.      Right lower leg: No edema.      Left lower leg: No edema.   Skin:     General: Skin is warm and dry.      Capillary Refill: Capillary refill takes less than 2 seconds.   Neurological:      General: No focal deficit present.      Mental Status: She is alert and oriented to person, place, and time. Mental status is at baseline.      Cranial Nerves: No cranial nerve deficit.    "   Sensory: No sensory deficit.      Motor: No weakness.      Coordination: Coordination normal.   Psychiatric:         Mood and Affect: Mood normal.         Behavior: Behavior normal.         Thought Content: Thought content normal.         Judgment: Judgment normal.         Procedures    ED Course:    ED Course as of 04/10/25 0333   Wed Apr 09, 2025   2145 EKG interpreted by me, normal sinus rhythm with no concerning ST changes noted, T wave inversion in lead v4, no reciprocal change, rate of 71, abnormal EKG [JE]   2150 Chest x-ray independently interpreted by me showed no acute process [JE]      ED Course User Index  [JE] John Norton MD       Lab Results (last 24 hours)       Procedure Component Value Units Date/Time    CBC & Differential [[id number]]  (Abnormal) Collected: 04/09/25 2123    Specimen: Blood Updated: 04/09/25 2133    Narrative:      The following orders were created for panel order CBC & Differential.  Procedure                               Abnormality         Status                     ---------                               -----------         ------                     CBC Auto Differential[[id number]]        Abnormal            Final result               Scan Slide[[id number]]                                                                    Please view results for these tests on the individual orders.    Comprehensive Metabolic Panel [[id number]]  (Abnormal) Collected: 04/09/25 2123    Specimen: Blood Updated: 04/09/25 2159     Glucose 113 mg/dL      BUN 16 mg/dL      Creatinine 0.59 mg/dL      Sodium 143 mmol/L      Potassium 4.3 mmol/L      Chloride 98 mmol/L      CO2 38.0 mmol/L      Calcium 9.3 mg/dL      Total Protein 6.7 g/dL      Albumin 4.0 g/dL      ALT (SGPT) 46 U/L      AST (SGOT) 65 U/L      Alkaline Phosphatase 99 U/L      Total Bilirubin 0.3 mg/dL      Globulin 2.7 gm/dL      A/G Ratio 1.5 g/dL      BUN/Creatinine Ratio 27.1     Anion Gap 7.0 mmol/L      eGFR 92.4  mL/min/1.73     Narrative:      GFR Categories in Chronic Kidney Disease (CKD)      GFR Category          GFR (mL/min/1.73)    Interpretation  G1                     90 or greater         Normal or high (1)  G2                      60-89                Mild decrease (1)  G3a                   45-59                Mild to moderate decrease  G3b                   30-44                Moderate to severe decrease  G4                    15-29                Severe decrease  G5                    14 or less           Kidney failure          (1)In the absence of evidence of kidney disease, neither GFR category G1 or G2 fulfill the criteria for CKD.    eGFR calculation 2021 CKD-EPI creatinine equation, which does not include race as a factor    BNP [[id number]]  (Abnormal) Collected: 04/09/25 2123    Specimen: Blood Updated: 04/09/25 2200     proBNP 2,479.0 pg/mL     Narrative:      This assay is used as an aid in the diagnosis of individuals suspected of having heart failure. It can be used as an aid in the diagnosis of acute decompensated heart failure (ADHF) in patients presenting with signs and symptoms of ADHF to the emergency department (ED). In addition, NT-proBNP of <300 pg/mL indicates ADHF is not likely.    Age Range Result Interpretation  NT-proBNP Concentration (pg/mL:      <50             Positive            >450                   Gray                 300-450                    Negative             <300    50-75           Positive            >900                  Gray                300-900                  Negative            <300      >75             Positive            >1800                  Gray                300-1800                  Negative            <300    High Sensitivity Troponin T [[id number]]  (Abnormal) Collected: 04/09/25 2123    Specimen: Blood Updated: 04/09/25 2159     HS Troponin T 15 ng/L     Narrative:      High Sensitive Troponin T Reference Range:  <14.0 ng/L- Negative Female for  AMI  <22.0 ng/L- Negative Male for AMI  >=14 - Abnormal Female indicating possible myocardial injury.  >=22 - Abnormal Male indicating possible myocardial injury.   Clinicians would have to utilize clinical acumen, EKG, Troponin, and serial changes to determine if it is an Acute Myocardial Infarction or myocardial injury due to an underlying chronic condition.         COVID-19 and FLU A/B PCR, 1 HR TAT - Swab, Nasopharynx [[id number]]  (Normal) Collected: 04/09/25 2123    Specimen: Swab from Nasopharynx Updated: 04/09/25 2159     COVID19 Not Detected     Influenza A PCR Not Detected     Influenza B PCR Not Detected    Narrative:      Fact sheet for providers: https://www.fda.gov/media/940781/download    Fact sheet for patients: https://www.fda.gov/media/853352/download    Test performed by PCR.    CBC Auto Differential [[id number]]  (Abnormal) Collected: 04/09/25 2123    Specimen: Blood Updated: 04/09/25 2133     WBC 5.43 10*3/mm3      RBC 4.19 10*6/mm3      Hemoglobin 11.0 g/dL      Hematocrit 37.0 %      MCV 88.3 fL      MCH 26.3 pg      MCHC 29.7 g/dL      RDW 15.0 %      RDW-SD 48.1 fl      MPV 10.7 fL      Platelets 143 10*3/mm3      Neutrophil % 70.6 %      Lymphocyte % 22.1 %      Monocyte % 5.5 %      Eosinophil % 0.6 %      Basophil % 0.6 %      Immature Grans % 0.6 %      Neutrophils, Absolute 3.84 10*3/mm3      Lymphocytes, Absolute 1.20 10*3/mm3      Monocytes, Absolute 0.30 10*3/mm3      Eosinophils, Absolute 0.03 10*3/mm3      Basophils, Absolute 0.03 10*3/mm3      Immature Grans, Absolute 0.03 10*3/mm3      nRBC 0.0 /100 WBC     C-reactive Protein [[id number]]  (Abnormal) Collected: 04/09/25 2123    Specimen: Blood Updated: 04/09/25 2147     C-Reactive Protein 1.14 mg/dL     Procalcitonin [[id number]]  (Normal) Collected: 04/09/25 2123    Specimen: Blood Updated: 04/09/25 2158     Procalcitonin 0.06 ng/mL     Narrative:      As a Marker for Sepsis (Non-Neonates):    1. <0.5 ng/mL represents a low risk of  "severe sepsis and/or septic shock.  2. >2 ng/mL represents a high risk of severe sepsis and/or septic shock.    As a Marker for Lower Respiratory Tract Infections that require antibiotic therapy:    PCT on Admission    Antibiotic Therapy       6-12 Hrs later    >0.5                Strongly Recommended  >0.25 - <0.5        Recommended   0.1 - 0.25          Discouraged              Remeasure/reassess PCT  <0.1                Strongly Discouraged     Remeasure/reassess PCT    As 28 day mortality risk marker: \"Change in Procalcitonin Result\" (>80% or <=80%) if Day 0 (or Day 1) and Day 4 values are available. Refer to http://www.[facility] [place]p[place]cal[place]    Change in PCT <=80%  A decrease of PCT levels below or equal to 80% defines a positive change in PCT test result representing a higher risk for 28-day all-cause mortality of patients diagnosed with severe sepsis for septic shock.    Change in PCT >80%  A decrease of PCT levels of more than 80% defines a negative change in PCT result representing a lower risk for 28-day all-cause mortality of patients diagnosed with severe sepsis or septic shock.       Magnesium [[id number]]  (Normal) Collected: 04/09/25 2123    Specimen: Blood Updated: 04/09/25 2147     Magnesium 1.9 mg/dL     High Sensitivity Troponin T 1Hr [[id number]]  (Abnormal) Collected: 04/09/25 2226    Specimen: Blood Updated: 04/09/25 2249     HS Troponin T 14 ng/L      Troponin T Numeric Delta -1 ng/L      Troponin T % Delta -7    Narrative:      High Sensitive Troponin T Reference Range:  <14.0 ng/L- Negative Female for AMI  <22.0 ng/L- Negative Male for AMI  >=14 - Abnormal Female indicating possible myocardial injury.  >=22 - Abnormal Male indicating possible myocardial injury.   Clinicians would have to utilize clinical acumen, EKG, Troponin, and serial changes to determine if it is an Acute Myocardial Infarction or myocardial injury due to an underlying chronic condition.         Urinalysis With Culture " If Indicated - Urine, Clean Catch [[id number]]  (Normal) Collected: 04/10/25 0056    Specimen: Urine, Clean Catch Updated: 04/10/25 0110     Color, UA Yellow     Appearance, UA Clear     pH, UA 7.5     Specific Gravity, UA <=1.005     Glucose, UA Negative     Ketones, UA Negative     Bilirubin, UA Negative     Blood, UA Negative     Protein, UA Negative     Leuk Esterase, UA Negative     Nitrite, UA Negative     Urobilinogen, UA 0.2 E.U./dL    Narrative:      In absence of clinical symptoms, the presence of pyuria, bacteria, and/or nitrites on the urinalysis result does not correlate with infection.  Urine microscopic not indicated.             No radiology results from the last 24 hrs       MDM     Amount and/or Complexity of Data Reviewed  Decide to obtain previous medical records or to obtain history from someone other than the patient: yes  Independent visualization of images, tracings, or specimens: yes        Initial impression of presenting illness: Shortness of breath    DDX: includes but is not limited to: Bacterial pneumonia, viral URI, ACS, MI, COPD exacerbation, PE    Patient arrives stable with vitals interpreted by myself.     Pertinent features from physical exam: Clear to auscultation, regular rate and rhythm, no murmur, nontender to abdominal palpation, stable on patient's home O2.    Initial diagnostic plan: CBC, CMP, troponin, BNP, EKG, chest x-ray, CRP, Pro-Aung, magnesium    Results from initial plan were reviewed and interpreted by me revealing patient with no concerns for bacterial pneumonia on chest x-ray per my independent interpretation, patient also with elevated BNP on workup, no concern for acute cardiac process    Diagnostic information from other sources: Discussed with EMS on arrival and reviewed past medical records    Interventions / Re-evaluation: Given Solu-Medrol, DuoNeb, magnesium given concerns for COPD exacerbation, given dose of IV Lasix due to concerns for elevated BNP,  patient remains without hypoxia on patient's home O2 settings    Results/clinical rationale were discussed with patient at bedside    Consultations/Discussion of results with other physicians: Discussed my concern for COPD exacerbation as etiology of patient symptoms today.  Also discussed possibility of CHF complicating patient's picture.  Will prescribe prednisone and azithromycin for treatment of COPD exacerbation will prescribe 2 extra days of extra diuretic for the patient and have referred patient to disease management clinic to ensure that she improves appropriately after these medication changes.  Given strict turn precaution for worsening shortness of breath in spite of this therapy.    Disposition plan: Discharge  -----        Final diagnoses:   COPD with acute exacerbation          John Norton MD  04/10/25 0334

## 2025-04-10 NOTE — DISCHARGE INSTRUCTIONS
You were evaluated for shortness of breath.  We got lab work and a chest x-ray that all showed no concerns for any bacterial pneumonia or other acute process.  As we discussed, we believe that you have a COPD exacerbation as etiology of your symptoms.  You also had lab findings of increased fluid on your lungs.  We also gave you diuretics to help with that.  You are now stable for discharge.  We have prescribed you 2 days of extra diuretic and have prescribed you prednisone and azithromycin to help treat your COPD exacerbation.  We would recommend that you follow with your primary care doctor to ensure that you improve appropriately and have also sent a referral for you to follow-up in our COPD clinic to ensure that you are improving appropriately.  If you have worsening shortness of breath in spite of this therapy, please come back to the to the Emergency Department for further evaluation.  You are now stable for discharge.

## 2025-04-10 NOTE — ED NOTES
Pt left in stable condition and a&o x4. Pt left in a wheelchair accompanied by ER tech and driven by family member via personal vehicle. Pt and family had no further questions following discharge teaching.

## 2025-04-10 NOTE — TELEPHONE ENCOUNTER
Pt called requested samples of Eliquis 5mg. 2 boxes left at the  for .    Lot: [id number]  Exp: 6/31/26

## 2025-04-14 ENCOUNTER — OFFICE VISIT (OUTPATIENT)
Dept: INTERNAL MEDICINE | Facility: CLINIC | Age: 80
End: 2025-04-14
Payer: MEDICARE

## 2025-04-14 VITALS
WEIGHT: 131 LBS | BODY MASS INDEX: 21.83 KG/M2 | TEMPERATURE: 98.4 F | HEART RATE: 72 BPM | SYSTOLIC BLOOD PRESSURE: 136 MMHG | DIASTOLIC BLOOD PRESSURE: 72 MMHG | HEIGHT: 65 IN | OXYGEN SATURATION: 98 %

## 2025-04-14 DIAGNOSIS — B37.9 ANTIBIOTIC-INDUCED YEAST INFECTION: ICD-10-CM

## 2025-04-14 DIAGNOSIS — H81.13 BENIGN PAROXYSMAL POSITIONAL VERTIGO DUE TO BILATERAL VESTIBULAR DISORDER: Primary | ICD-10-CM

## 2025-04-14 DIAGNOSIS — T36.95XA ANTIBIOTIC-INDUCED YEAST INFECTION: ICD-10-CM

## 2025-04-14 DIAGNOSIS — F41.0 PANIC ATTACK: ICD-10-CM

## 2025-04-14 DIAGNOSIS — Z09 HOSPITAL DISCHARGE FOLLOW-UP: ICD-10-CM

## 2025-04-14 RX ORDER — DIAZEPAM 5 MG/1
5 TABLET ORAL EVERY 8 HOURS PRN
Qty: 90 TABLET | Refills: 1 | Status: SHIPPED | OUTPATIENT
Start: 2025-04-14

## 2025-04-14 RX ORDER — MECLIZINE HYDROCHLORIDE 25 MG/1
25 TABLET ORAL 3 TIMES DAILY PRN
Qty: 90 TABLET | Refills: 0 | Status: SHIPPED | OUTPATIENT
Start: 2025-04-14

## 2025-04-14 RX ORDER — FLUCONAZOLE 100 MG/1
100 TABLET ORAL DAILY
Qty: 3 TABLET | Refills: 0 | Status: SHIPPED | OUTPATIENT
Start: 2025-04-14

## 2025-04-15 NOTE — PROGRESS NOTES
Subjective   Gabi Dudley is a 80 y.o. female.     History of Present Illness  Presents for hospital follow up and complaints.   Was seen for copd exacerbation. Is on azithromycin and prednisone. Seems to be working. But she has been having more anxiety and panic attacks. Had active panic attack in office today. Had to have fan placed on her face because she felt like she couldn't get air in and was very hot and said she just needed to go home and lie down. With fan on she calmed down significantly and started feeling much better. Oxygen levels were above 90. States that her klonipin is just not working for her at all. She is panicking frequently and cannot sleep. Denies si hi.  Has yeast infection from zpack      The following portions of the patient's history were reviewed and updated as appropriate: allergies, current medications, past family history, past medical history, past social history, past surgical history, and problem list.    Review of Systems   All other systems reviewed and are negative.      Objective   Physical Exam  Vitals and nursing note reviewed.   Constitutional:       Appearance: Normal appearance. She is ill-appearing.   HENT:      Head: Normocephalic and atraumatic.      Right Ear: External ear normal.      Left Ear: External ear normal.      Nose: Nose normal.      Mouth/Throat:      Mouth: Mucous membranes are moist.      Pharynx: Oropharynx is clear. No oropharyngeal exudate or posterior oropharyngeal erythema.   Eyes:      Extraocular Movements: Extraocular movements intact.      Conjunctiva/sclera: Conjunctivae normal.      Pupils: Pupils are equal, round, and reactive to light.   Cardiovascular:      Rate and Rhythm: Normal rate and regular rhythm.      Pulses: Normal pulses.      Heart sounds: Normal heart sounds.   Pulmonary:      Effort: Pulmonary effort is normal.      Breath sounds: Normal breath sounds.   Abdominal:      General: Abdomen is flat. Bowel sounds are normal.       Palpations: Abdomen is soft.   Musculoskeletal:         General: Normal range of motion.      Cervical back: Normal range of motion.   Skin:     General: Skin is warm.      Capillary Refill: Capillary refill takes less than 2 seconds.   Neurological:      General: No focal deficit present.      Mental Status: She is alert and oriented to person, place, and time. Mental status is at baseline.   Psychiatric:         Mood and Affect: Mood normal.         Behavior: Behavior normal.         Thought Content: Thought content normal.         Judgment: Judgment normal.         Assessment & Plan   Diagnoses and all orders for this visit:    1. Benign paroxysmal positional vertigo due to bilateral vestibular disorder (Primary)  -     meclizine (ANTIVERT) 25 MG tablet; Take 1 tablet by mouth 3 (Three) Times a Day As Needed for Dizziness.  Dispense: 90 tablet; Refill: 0    2. Panic attack  -     diazePAM (Valium) 5 MG tablet; Take 1 tablet by mouth Every 8 (Eight) Hours As Needed for Anxiety.  Dispense: 90 tablet; Refill: 1    3. Antibiotic-induced yeast infection  -     fluconazole (Diflucan) 100 MG tablet; Take 1 tablet by mouth Daily.  Dispense: 3 tablet; Refill: 0    4. Hospital discharge follow-up     Stop klonipin. Start valium. States she was on it before and felt it may have worked better for her

## 2025-04-23 NOTE — PLAN OF CARE
Goal Outcome Evaluation:  Plan of Care Reviewed With: patient        Progress: improving  Outcome Evaluation: Pt participated in skilled OT interventions to address self care, transfers and endurance. Pt received sitting on the BSC on 3L O2 with sats >92%. Pt required min A to stand with gait belt and mod A to maintain static standing balance during perineal hygeine. Pt unable to participate in perineal hygeine secondary to poor endurance, balance and standing tolerance requiring total assist. Pt fatigued with one minute of standing and required seated rest break prior to standing again for perineal hygeine. Pt fatigued quickly and requested to sit back down. On third stand perineal hygeine completed and pt able to pivot to the chair with mod A. Pt positioned for comfort in the chair. She is progressing with skilled OT services and will continue to benfit from OT during hospitilization to maximize independence.          23-Apr-2025

## 2025-04-28 DIAGNOSIS — I48.0 PAROXYSMAL ATRIAL FIBRILLATION: ICD-10-CM

## 2025-05-01 ENCOUNTER — TELEPHONE (OUTPATIENT)
Dept: CASE MANAGEMENT | Facility: OTHER | Age: 80
End: 2025-05-01
Payer: MEDICARE

## 2025-05-01 ENCOUNTER — OFFICE VISIT (OUTPATIENT)
Dept: INTERNAL MEDICINE | Facility: CLINIC | Age: 80
End: 2025-05-01
Payer: MEDICARE

## 2025-05-01 VITALS
SYSTOLIC BLOOD PRESSURE: 120 MMHG | HEIGHT: 65 IN | BODY MASS INDEX: 21.66 KG/M2 | OXYGEN SATURATION: 99 % | DIASTOLIC BLOOD PRESSURE: 68 MMHG | WEIGHT: 130 LBS | TEMPERATURE: 98.2 F | HEART RATE: 71 BPM

## 2025-05-01 DIAGNOSIS — R04.0 BLEEDING NOSE: ICD-10-CM

## 2025-05-01 DIAGNOSIS — R11.0 NAUSEA: ICD-10-CM

## 2025-05-01 DIAGNOSIS — T36.95XA ANTIBIOTIC-INDUCED YEAST INFECTION: ICD-10-CM

## 2025-05-01 DIAGNOSIS — J01.10 ACUTE NON-RECURRENT FRONTAL SINUSITIS: ICD-10-CM

## 2025-05-01 DIAGNOSIS — J96.22 ACUTE ON CHRONIC RESPIRATORY FAILURE WITH HYPOXIA AND HYPERCAPNIA: Primary | ICD-10-CM

## 2025-05-01 DIAGNOSIS — J96.21 ACUTE ON CHRONIC RESPIRATORY FAILURE WITH HYPOXIA AND HYPERCAPNIA: Primary | ICD-10-CM

## 2025-05-01 DIAGNOSIS — J96.21 ACUTE ON CHRONIC RESPIRATORY FAILURE WITH HYPOXIA: Primary | ICD-10-CM

## 2025-05-01 DIAGNOSIS — B37.9 ANTIBIOTIC-INDUCED YEAST INFECTION: ICD-10-CM

## 2025-05-01 DIAGNOSIS — J44.1 COPD EXACERBATION: ICD-10-CM

## 2025-05-01 RX ORDER — ONDANSETRON 8 MG/1
8 TABLET, ORALLY DISINTEGRATING ORAL EVERY 8 HOURS PRN
Qty: 30 TABLET | Refills: 0 | Status: ON HOLD | OUTPATIENT
Start: 2025-05-01

## 2025-05-01 RX ORDER — LEVOCETIRIZINE DIHYDROCHLORIDE 5 MG/1
5 TABLET, FILM COATED ORAL EVERY EVENING
Qty: 90 TABLET | Refills: 0 | Status: ON HOLD | OUTPATIENT
Start: 2025-05-01

## 2025-05-01 RX ORDER — FLUCONAZOLE 100 MG/1
100 TABLET ORAL DAILY
Qty: 3 TABLET | Refills: 0 | Status: ON HOLD | OUTPATIENT
Start: 2025-05-01

## 2025-05-01 RX ORDER — SODIUM CHLORIDE/ALOE VERA
1 GEL (GRAM) NASAL AS NEEDED
Qty: 14.1 G | Refills: 0 | Status: ON HOLD | OUTPATIENT
Start: 2025-05-01

## 2025-05-01 NOTE — PROGRESS NOTES
Subjective   Gabi Dudley is a 80 y.o. female.     History of Present Illness  Presents with complaints  Sinus pressure for 2 weeks. Bloody nose off and on. Nasal drainage cycling with nasal dryness. Fatigue. Oxygen has been stable in upper 90s  Has been having nausea and diarrhea for some itme. States she cycles between diarrhea and constipation all the time. Nausea has been worse lately. Throwing up some too. States this happens to her frequently. States she does not want to see a gi doctor. She knows she has diverticulosis and it flares at times. No fever or chills. No bloody bm      The following portions of the patient's history were reviewed and updated as appropriate: allergies, current medications, past family history, past medical history, past social history, past surgical history, and problem list.    Review of Systems   All other systems reviewed and are negative.      Objective   Physical Exam  Vitals and nursing note reviewed.   Constitutional:       Appearance: Normal appearance.   HENT:      Head: Normocephalic and atraumatic.      Right Ear: External ear normal.      Left Ear: External ear normal.      Nose: Nose normal.      Mouth/Throat:      Mouth: Mucous membranes are moist.      Pharynx: Oropharynx is clear. No oropharyngeal exudate or posterior oropharyngeal erythema.   Eyes:      Extraocular Movements: Extraocular movements intact.      Conjunctiva/sclera: Conjunctivae normal.      Pupils: Pupils are equal, round, and reactive to light.   Cardiovascular:      Rate and Rhythm: Normal rate and regular rhythm.      Pulses: Normal pulses.      Heart sounds: Normal heart sounds.   Pulmonary:      Effort: Pulmonary effort is normal.   Abdominal:      General: Abdomen is flat. Bowel sounds are normal.      Palpations: Abdomen is soft.   Musculoskeletal:         General: Normal range of motion.      Cervical back: Normal range of motion.   Skin:     General: Skin is warm.      Capillary Refill:  Capillary refill takes less than 2 seconds.   Neurological:      General: No focal deficit present.      Mental Status: She is alert and oriented to person, place, and time. Mental status is at baseline.   Psychiatric:         Mood and Affect: Mood normal.         Behavior: Behavior normal.         Thought Content: Thought content normal.         Judgment: Judgment normal.         Assessment & Plan   Diagnoses and all orders for this visit:    1. Acute on chronic respiratory failure with hypoxia (Primary)  -     levocetirizine (XYZAL) 5 MG tablet; Take 1 tablet by mouth Every Evening.  Dispense: 90 tablet; Refill: 0    2. Bleeding nose  -     saline (AYR) gel nasal gel; Apply 1 Application topically to the appropriate area as directed As Needed (nasal dryness nose bleeds).  Dispense: 14.1 g; Refill: 0    3. Acute non-recurrent frontal sinusitis  -     amoxicillin-clavulanate (AUGMENTIN) 875-125 MG per tablet; Take 1 tablet by mouth 2 (Two) Times a Day.  Dispense: 20 tablet; Refill: 0    4. Antibiotic-induced yeast infection  -     fluconazole (Diflucan) 100 MG tablet; Take 1 tablet by mouth Daily.  Dispense: 3 tablet; Refill: 0    5. Nausea  -     ondansetron ODT (ZOFRAN-ODT) 8 MG disintegrating tablet; Place 1 tablet on the tongue Every 8 (Eight) Hours As Needed for Nausea.  Dispense: 30 tablet; Refill: 0

## 2025-05-01 NOTE — TELEPHONE ENCOUNTER
ANANT CM outreach with Patient; left a voicemail requesting a return call to EFRAÍN Rowe with [facility] at [phone].

## 2025-05-02 ENCOUNTER — TELEPHONE (OUTPATIENT)
Dept: CASE MANAGEMENT | Facility: OTHER | Age: 80
End: 2025-05-02
Payer: MEDICARE

## 2025-05-02 DIAGNOSIS — J96.21 ACUTE ON CHRONIC RESPIRATORY FAILURE WITH HYPOXIA AND HYPERCAPNIA: Primary | ICD-10-CM

## 2025-05-02 DIAGNOSIS — J44.1 COPD EXACERBATION: ICD-10-CM

## 2025-05-02 DIAGNOSIS — J96.22 ACUTE ON CHRONIC RESPIRATORY FAILURE WITH HYPOXIA AND HYPERCAPNIA: Primary | ICD-10-CM

## 2025-05-02 NOTE — TELEPHONE ENCOUNTER
ANANT CM outreach with Patient; left a voicemail requesting a return call to EFRAÍN Rowe with [facility] at [phone].

## 2025-05-03 ENCOUNTER — HOSPITAL ENCOUNTER (INPATIENT)
Facility: HOSPITAL | Age: 80
LOS: 1 days | Discharge: HOME HEALTH | End: 2025-05-06
Attending: STUDENT IN AN ORGANIZED HEALTH CARE EDUCATION/TRAINING PROGRAM | Admitting: FAMILY MEDICINE
Payer: MEDICARE

## 2025-05-03 ENCOUNTER — APPOINTMENT (OUTPATIENT)
Dept: GENERAL RADIOLOGY | Facility: HOSPITAL | Age: 80
End: 2025-05-03
Payer: MEDICARE

## 2025-05-03 DIAGNOSIS — I50.23 ACUTE ON CHRONIC SYSTOLIC CONGESTIVE HEART FAILURE: Primary | ICD-10-CM

## 2025-05-03 DIAGNOSIS — J44.0 CHRONIC OBSTRUCTIVE PULMONARY DISEASE WITH ACUTE LOWER RESPIRATORY INFECTION: ICD-10-CM

## 2025-05-03 DIAGNOSIS — J44.1 COPD EXACERBATION: ICD-10-CM

## 2025-05-03 LAB
A-A DO2: 76.6 MMHG
ALBUMIN SERPL-MCNC: 3.7 G/DL (ref 3.5–5.2)
ALBUMIN/GLOB SERPL: 1.4 G/DL
ALP SERPL-CCNC: 96 U/L (ref 39–117)
ALT SERPL W P-5'-P-CCNC: 19 U/L (ref 1–33)
ANION GAP SERPL CALCULATED.3IONS-SCNC: 1.2 MMOL/L (ref 5–15)
ARTERIAL PATENCY WRIST A: POSITIVE
AST SERPL-CCNC: 29 U/L (ref 1–32)
ATMOSPHERIC PRESS: 728 MMHG
B PARAPERT DNA SPEC QL NAA+PROBE: NOT DETECTED
B PERT DNA SPEC QL NAA+PROBE: NOT DETECTED
BASE EXCESS BLDA CALC-SCNC: 18.4 MMOL/L (ref 0–2)
BASOPHILS # BLD AUTO: 0.03 10*3/MM3 (ref 0–0.2)
BASOPHILS NFR BLD AUTO: 0.6 % (ref 0–1.5)
BDY SITE: ABNORMAL
BILIRUB SERPL-MCNC: 0.2 MG/DL (ref 0–1.2)
BUN SERPL-MCNC: 17 MG/DL (ref 8–23)
BUN/CREAT SERPL: 29.8 (ref 7–25)
C PNEUM DNA NPH QL NAA+NON-PROBE: NOT DETECTED
CALCIUM SPEC-SCNC: 9.3 MG/DL (ref 8.6–10.5)
CHLORIDE SERPL-SCNC: 96 MMOL/L (ref 98–107)
CO2 SERPL-SCNC: 45.8 MMOL/L (ref 22–29)
COHGB MFR BLD: 1.1 % (ref 0–2)
CREAT SERPL-MCNC: 0.57 MG/DL (ref 0.57–1)
DEPRECATED RDW RBC AUTO: 51.6 FL (ref 37–54)
EGFRCR SERPLBLD CKD-EPI 2021: 93.2 ML/MIN/1.73
EOSINOPHIL # BLD AUTO: 0.03 10*3/MM3 (ref 0–0.4)
EOSINOPHIL NFR BLD AUTO: 0.6 % (ref 0.3–6.2)
ERYTHROCYTE [DISTWIDTH] IN BLOOD BY AUTOMATED COUNT: 14.9 % (ref 12.3–15.4)
FLUAV SUBTYP SPEC NAA+PROBE: NOT DETECTED
FLUBV RNA ISLT QL NAA+PROBE: NOT DETECTED
GAS FLOW AIRWAY: 4 LPM
GEN 5 1HR TROPONIN T REFLEX: 13 NG/L
GLOBULIN UR ELPH-MCNC: 2.7 GM/DL
GLUCOSE SERPL-MCNC: 127 MG/DL (ref 65–99)
HADV DNA SPEC NAA+PROBE: NOT DETECTED
HCO3 BLDA-SCNC: 47.1 MMOL/L (ref 22–28)
HCOV 229E RNA SPEC QL NAA+PROBE: NOT DETECTED
HCOV HKU1 RNA SPEC QL NAA+PROBE: NOT DETECTED
HCOV NL63 RNA SPEC QL NAA+PROBE: NOT DETECTED
HCOV OC43 RNA SPEC QL NAA+PROBE: NOT DETECTED
HCT VFR BLD AUTO: 35.7 % (ref 34–46.6)
HCT VFR BLD CALC: 29.3 %
HGB BLD-MCNC: 10 G/DL (ref 12–15.9)
HMPV RNA NPH QL NAA+NON-PROBE: NOT DETECTED
HOLD SPECIMEN: NORMAL
HOLD SPECIMEN: NORMAL
HPIV1 RNA ISLT QL NAA+PROBE: NOT DETECTED
HPIV2 RNA SPEC QL NAA+PROBE: NOT DETECTED
HPIV3 RNA NPH QL NAA+PROBE: NOT DETECTED
HPIV4 P GENE NPH QL NAA+PROBE: NOT DETECTED
HYPOCHROMIA BLD QL: NORMAL
IMM GRANULOCYTES # BLD AUTO: 0.02 10*3/MM3 (ref 0–0.05)
IMM GRANULOCYTES NFR BLD AUTO: 0.4 % (ref 0–0.5)
INHALED O2 CONCENTRATION: 36 %
LYMPHOCYTES # BLD AUTO: 1.12 10*3/MM3 (ref 0.7–3.1)
LYMPHOCYTES NFR BLD AUTO: 22.6 % (ref 19.6–45.3)
Lab: ABNORMAL
Lab: ABNORMAL
M PNEUMO IGG SER IA-ACNC: NOT DETECTED
MCH RBC QN AUTO: 26.3 PG (ref 26.6–33)
MCHC RBC AUTO-ENTMCNC: 28 G/DL (ref 31.5–35.7)
MCV RBC AUTO: 93.9 FL (ref 79–97)
METHGB BLD QL: 0.8 % (ref 0–1.5)
MODALITY: ABNORMAL
MONOCYTES # BLD AUTO: 0.35 10*3/MM3 (ref 0.1–0.9)
MONOCYTES NFR BLD AUTO: 7.1 % (ref 5–12)
NEUTROPHILS NFR BLD AUTO: 3.4 10*3/MM3 (ref 1.7–7)
NEUTROPHILS NFR BLD AUTO: 68.7 % (ref 42.7–76)
NOTIFIED BY: ABNORMAL
NOTIFIED WHO: ABNORMAL
NRBC BLD AUTO-RTO: 0 /100 WBC (ref 0–0.2)
NT-PROBNP SERPL-MCNC: 2052 PG/ML (ref 0–1800)
OXYHGB MFR BLDV: 94.2 % (ref 94–99)
PCO2 BLDA: 85.4 MM HG (ref 35–45)
PCO2 TEMP ADJ BLD: ABNORMAL MM[HG]
PH BLDA: 7.35 PH UNITS (ref 7.3–7.5)
PH, TEMP CORRECTED: ABNORMAL
PLATELET # BLD AUTO: 167 10*3/MM3 (ref 140–450)
PMV BLD AUTO: 11.2 FL (ref 6–12)
PO2 BLDA: 74.2 MM HG (ref 75–100)
PO2 TEMP ADJ BLD: ABNORMAL MM[HG]
POTASSIUM SERPL-SCNC: 4.3 MMOL/L (ref 3.5–5.2)
PROT SERPL-MCNC: 6.4 G/DL (ref 6–8.5)
RBC # BLD AUTO: 3.8 10*6/MM3 (ref 3.77–5.28)
RHINOVIRUS RNA SPEC NAA+PROBE: NOT DETECTED
RSV RNA NPH QL NAA+NON-PROBE: NOT DETECTED
SAO2 % BLDCOA: 96 % (ref 94–100)
SARS-COV-2 RNA RESP QL NAA+PROBE: NOT DETECTED
SMALL PLATELETS BLD QL SMEAR: ADEQUATE
SODIUM SERPL-SCNC: 143 MMOL/L (ref 136–145)
TROPONIN T NUMERIC DELTA: 0 NG/L
TROPONIN T SERPL HS-MCNC: 13 NG/L
VENTILATOR MODE: ABNORMAL
WBC MORPH BLD: NORMAL
WBC NRBC COR # BLD AUTO: 4.95 10*3/MM3 (ref 3.4–10.8)
WHOLE BLOOD HOLD COAG: NORMAL
WHOLE BLOOD HOLD SPECIMEN: NORMAL

## 2025-05-03 PROCEDURE — 94799 UNLISTED PULMONARY SVC/PX: CPT

## 2025-05-03 PROCEDURE — 94640 AIRWAY INHALATION TREATMENT: CPT

## 2025-05-03 PROCEDURE — G0378 HOSPITAL OBSERVATION PER HR: HCPCS

## 2025-05-03 PROCEDURE — 71045 X-RAY EXAM CHEST 1 VIEW: CPT

## 2025-05-03 PROCEDURE — 83880 ASSAY OF NATRIURETIC PEPTIDE: CPT | Performed by: STUDENT IN AN ORGANIZED HEALTH CARE EDUCATION/TRAINING PROGRAM

## 2025-05-03 PROCEDURE — 83050 HGB METHEMOGLOBIN QUAN: CPT

## 2025-05-03 PROCEDURE — 82375 ASSAY CARBOXYHB QUANT: CPT

## 2025-05-03 PROCEDURE — 25010000002 FUROSEMIDE PER 20 MG: Performed by: STUDENT IN AN ORGANIZED HEALTH CARE EDUCATION/TRAINING PROGRAM

## 2025-05-03 PROCEDURE — 85025 COMPLETE CBC W/AUTO DIFF WBC: CPT | Performed by: STUDENT IN AN ORGANIZED HEALTH CARE EDUCATION/TRAINING PROGRAM

## 2025-05-03 PROCEDURE — 0202U NFCT DS 22 TRGT SARS-COV-2: CPT | Performed by: STUDENT IN AN ORGANIZED HEALTH CARE EDUCATION/TRAINING PROGRAM

## 2025-05-03 PROCEDURE — 25010000002 METHYLPREDNISOLONE PER 125 MG: Performed by: STUDENT IN AN ORGANIZED HEALTH CARE EDUCATION/TRAINING PROGRAM

## 2025-05-03 PROCEDURE — 99285 EMERGENCY DEPT VISIT HI MDM: CPT | Performed by: STUDENT IN AN ORGANIZED HEALTH CARE EDUCATION/TRAINING PROGRAM

## 2025-05-03 PROCEDURE — 94761 N-INVAS EAR/PLS OXIMETRY MLT: CPT

## 2025-05-03 PROCEDURE — 84484 ASSAY OF TROPONIN QUANT: CPT | Performed by: STUDENT IN AN ORGANIZED HEALTH CARE EDUCATION/TRAINING PROGRAM

## 2025-05-03 PROCEDURE — 82805 BLOOD GASES W/O2 SATURATION: CPT

## 2025-05-03 PROCEDURE — 93005 ELECTROCARDIOGRAM TRACING: CPT | Performed by: STUDENT IN AN ORGANIZED HEALTH CARE EDUCATION/TRAINING PROGRAM

## 2025-05-03 PROCEDURE — 80053 COMPREHEN METABOLIC PANEL: CPT | Performed by: STUDENT IN AN ORGANIZED HEALTH CARE EDUCATION/TRAINING PROGRAM

## 2025-05-03 PROCEDURE — 99222 1ST HOSP IP/OBS MODERATE 55: CPT | Performed by: FAMILY MEDICINE

## 2025-05-03 PROCEDURE — 85007 BL SMEAR W/DIFF WBC COUNT: CPT | Performed by: STUDENT IN AN ORGANIZED HEALTH CARE EDUCATION/TRAINING PROGRAM

## 2025-05-03 PROCEDURE — 36600 WITHDRAWAL OF ARTERIAL BLOOD: CPT

## 2025-05-03 PROCEDURE — 25010000002 PIPERACILLIN SOD-TAZOBACTAM PER 1 G: Performed by: FAMILY MEDICINE

## 2025-05-03 RX ORDER — NITROGLYCERIN 0.4 MG/1
0.4 TABLET SUBLINGUAL
Status: DISCONTINUED | OUTPATIENT
Start: 2025-05-03 | End: 2025-05-06 | Stop reason: HOSPADM

## 2025-05-03 RX ORDER — HYDROXYZINE HYDROCHLORIDE 25 MG/1
25 TABLET, FILM COATED ORAL ONCE
Status: COMPLETED | OUTPATIENT
Start: 2025-05-03 | End: 2025-05-03

## 2025-05-03 RX ORDER — IPRATROPIUM BROMIDE AND ALBUTEROL SULFATE 2.5; .5 MG/3ML; MG/3ML
3 SOLUTION RESPIRATORY (INHALATION) EVERY 4 HOURS PRN
Status: DISCONTINUED | OUTPATIENT
Start: 2025-05-03 | End: 2025-05-06 | Stop reason: HOSPADM

## 2025-05-03 RX ORDER — ENOXAPARIN SODIUM 100 MG/ML
40 INJECTION SUBCUTANEOUS EVERY 24 HOURS
Status: DISCONTINUED | OUTPATIENT
Start: 2025-05-03 | End: 2025-05-03

## 2025-05-03 RX ORDER — METOPROLOL SUCCINATE 25 MG/1
25 TABLET, EXTENDED RELEASE ORAL
Status: DISCONTINUED | OUTPATIENT
Start: 2025-05-03 | End: 2025-05-05

## 2025-05-03 RX ORDER — SODIUM CHLORIDE 0.9 % (FLUSH) 0.9 %
10 SYRINGE (ML) INJECTION AS NEEDED
Status: DISCONTINUED | OUTPATIENT
Start: 2025-05-03 | End: 2025-05-06 | Stop reason: HOSPADM

## 2025-05-03 RX ORDER — ATORVASTATIN CALCIUM 40 MG/1
40 TABLET, FILM COATED ORAL DAILY
Status: DISCONTINUED | OUTPATIENT
Start: 2025-05-04 | End: 2025-05-06 | Stop reason: HOSPADM

## 2025-05-03 RX ORDER — METHYLPREDNISOLONE SODIUM SUCCINATE 125 MG/2ML
62.5 INJECTION, POWDER, LYOPHILIZED, FOR SOLUTION INTRAMUSCULAR; INTRAVENOUS DAILY
Status: DISCONTINUED | OUTPATIENT
Start: 2025-05-04 | End: 2025-05-05

## 2025-05-03 RX ORDER — SODIUM CHLORIDE 9 MG/ML
40 INJECTION, SOLUTION INTRAVENOUS AS NEEDED
Status: DISCONTINUED | OUTPATIENT
Start: 2025-05-03 | End: 2025-05-06 | Stop reason: HOSPADM

## 2025-05-03 RX ORDER — AMOXICILLIN 250 MG
2 CAPSULE ORAL 2 TIMES DAILY PRN
Status: DISCONTINUED | OUTPATIENT
Start: 2025-05-03 | End: 2025-05-06 | Stop reason: HOSPADM

## 2025-05-03 RX ORDER — ACETAMINOPHEN 650 MG/1
650 SUPPOSITORY RECTAL EVERY 4 HOURS PRN
Status: DISCONTINUED | OUTPATIENT
Start: 2025-05-03 | End: 2025-05-06 | Stop reason: HOSPADM

## 2025-05-03 RX ORDER — VALSARTAN 80 MG/1
40 TABLET ORAL DAILY
Status: DISCONTINUED | OUTPATIENT
Start: 2025-05-04 | End: 2025-05-06 | Stop reason: HOSPADM

## 2025-05-03 RX ORDER — POLYETHYLENE GLYCOL 3350 17 G/17G
17 POWDER, FOR SOLUTION ORAL DAILY PRN
Status: DISCONTINUED | OUTPATIENT
Start: 2025-05-03 | End: 2025-05-06 | Stop reason: HOSPADM

## 2025-05-03 RX ORDER — ACETAMINOPHEN 325 MG/1
650 TABLET ORAL EVERY 4 HOURS PRN
Status: DISCONTINUED | OUTPATIENT
Start: 2025-05-03 | End: 2025-05-06 | Stop reason: HOSPADM

## 2025-05-03 RX ORDER — BISACODYL 5 MG/1
5 TABLET, DELAYED RELEASE ORAL DAILY PRN
Status: DISCONTINUED | OUTPATIENT
Start: 2025-05-03 | End: 2025-05-06 | Stop reason: HOSPADM

## 2025-05-03 RX ORDER — SODIUM CHLORIDE 0.9 % (FLUSH) 0.9 %
10 SYRINGE (ML) INJECTION EVERY 12 HOURS SCHEDULED
Status: DISCONTINUED | OUTPATIENT
Start: 2025-05-03 | End: 2025-05-06 | Stop reason: HOSPADM

## 2025-05-03 RX ORDER — BUDESONIDE 0.5 MG/2ML
0.5 INHALANT ORAL
Status: DISCONTINUED | OUTPATIENT
Start: 2025-05-03 | End: 2025-05-06 | Stop reason: HOSPADM

## 2025-05-03 RX ORDER — TRAZODONE HYDROCHLORIDE 50 MG/1
100 TABLET ORAL NIGHTLY
Status: DISCONTINUED | OUTPATIENT
Start: 2025-05-03 | End: 2025-05-05

## 2025-05-03 RX ORDER — FUROSEMIDE 10 MG/ML
60 INJECTION INTRAMUSCULAR; INTRAVENOUS
Status: DISCONTINUED | OUTPATIENT
Start: 2025-05-04 | End: 2025-05-05

## 2025-05-03 RX ORDER — DIAZEPAM 5 MG/1
5 TABLET ORAL EVERY 8 HOURS PRN
Status: DISCONTINUED | OUTPATIENT
Start: 2025-05-03 | End: 2025-05-06 | Stop reason: HOSPADM

## 2025-05-03 RX ORDER — ALBUTEROL SULFATE 0.83 MG/ML
10 SOLUTION RESPIRATORY (INHALATION) ONCE
Status: COMPLETED | OUTPATIENT
Start: 2025-05-03 | End: 2025-05-03

## 2025-05-03 RX ORDER — ARFORMOTEROL TARTRATE 15 UG/2ML
15 SOLUTION RESPIRATORY (INHALATION)
Status: DISCONTINUED | OUTPATIENT
Start: 2025-05-03 | End: 2025-05-06 | Stop reason: HOSPADM

## 2025-05-03 RX ORDER — METHYLPREDNISOLONE SODIUM SUCCINATE 125 MG/2ML
125 INJECTION, POWDER, LYOPHILIZED, FOR SOLUTION INTRAMUSCULAR; INTRAVENOUS ONCE
Status: COMPLETED | OUTPATIENT
Start: 2025-05-03 | End: 2025-05-03

## 2025-05-03 RX ORDER — ONDANSETRON 2 MG/ML
4 INJECTION INTRAMUSCULAR; INTRAVENOUS EVERY 6 HOURS PRN
Status: DISCONTINUED | OUTPATIENT
Start: 2025-05-03 | End: 2025-05-06 | Stop reason: HOSPADM

## 2025-05-03 RX ORDER — AMIODARONE HYDROCHLORIDE 200 MG/1
200 TABLET ORAL
Status: DISCONTINUED | OUTPATIENT
Start: 2025-05-03 | End: 2025-05-06 | Stop reason: HOSPADM

## 2025-05-03 RX ORDER — ACETAMINOPHEN 160 MG/5ML
650 SOLUTION ORAL EVERY 4 HOURS PRN
Status: DISCONTINUED | OUTPATIENT
Start: 2025-05-03 | End: 2025-05-06 | Stop reason: HOSPADM

## 2025-05-03 RX ORDER — DULOXETIN HYDROCHLORIDE 30 MG/1
60 CAPSULE, DELAYED RELEASE ORAL 2 TIMES DAILY
Status: DISCONTINUED | OUTPATIENT
Start: 2025-05-03 | End: 2025-05-06 | Stop reason: HOSPADM

## 2025-05-03 RX ORDER — BISACODYL 10 MG
10 SUPPOSITORY, RECTAL RECTAL DAILY PRN
Status: DISCONTINUED | OUTPATIENT
Start: 2025-05-03 | End: 2025-05-06 | Stop reason: HOSPADM

## 2025-05-03 RX ORDER — LORAZEPAM 2 MG/ML
0.5 INJECTION INTRAMUSCULAR ONCE
Status: DISCONTINUED | OUTPATIENT
Start: 2025-05-03 | End: 2025-05-03

## 2025-05-03 RX ORDER — FUROSEMIDE 10 MG/ML
40 INJECTION INTRAMUSCULAR; INTRAVENOUS ONCE
Status: COMPLETED | OUTPATIENT
Start: 2025-05-03 | End: 2025-05-03

## 2025-05-03 RX ORDER — GUAIFENESIN 600 MG/1
600 TABLET, EXTENDED RELEASE ORAL EVERY 12 HOURS SCHEDULED
Status: DISCONTINUED | OUTPATIENT
Start: 2025-05-03 | End: 2025-05-06 | Stop reason: HOSPADM

## 2025-05-03 RX ADMIN — FUROSEMIDE 40 MG: 10 INJECTION, SOLUTION INTRAMUSCULAR; INTRAVENOUS at 16:17

## 2025-05-03 RX ADMIN — IPRATROPIUM BROMIDE 1 MG: 0.5 SOLUTION RESPIRATORY (INHALATION) at 14:18

## 2025-05-03 RX ADMIN — DIAZEPAM 5 MG: 5 TABLET ORAL at 20:46

## 2025-05-03 RX ADMIN — ARFORMOTEROL TARTRATE 15 MCG: 15 SOLUTION RESPIRATORY (INHALATION) at 19:15

## 2025-05-03 RX ADMIN — GUAIFENESIN 600 MG: 600 TABLET, EXTENDED RELEASE ORAL at 20:46

## 2025-05-03 RX ADMIN — DULOXETINE HYDROCHLORIDE 60 MG: 30 CAPSULE, DELAYED RELEASE ORAL at 20:46

## 2025-05-03 RX ADMIN — AMIODARONE HYDROCHLORIDE 200 MG: 200 TABLET ORAL at 17:51

## 2025-05-03 RX ADMIN — Medication 10 ML: at 20:46

## 2025-05-03 RX ADMIN — METHYLPREDNISOLONE SODIUM SUCCINATE 125 MG: 125 INJECTION, POWDER, FOR SOLUTION INTRAMUSCULAR; INTRAVENOUS at 14:13

## 2025-05-03 RX ADMIN — ALBUTEROL SULFATE 10 MG: 2.5 SOLUTION RESPIRATORY (INHALATION) at 14:18

## 2025-05-03 RX ADMIN — PIPERACILLIN AND TAZOBACTAM 3.38 G: 3; .375 INJECTION, POWDER, FOR SOLUTION INTRAVENOUS at 17:51

## 2025-05-03 RX ADMIN — BUDESONIDE INHALATION 0.5 MG: 0.5 SUSPENSION RESPIRATORY (INHALATION) at 19:15

## 2025-05-03 RX ADMIN — APIXABAN 5 MG: 5 TABLET, FILM COATED ORAL at 20:46

## 2025-05-03 RX ADMIN — METOPROLOL SUCCINATE 25 MG: 25 TABLET, EXTENDED RELEASE ORAL at 17:51

## 2025-05-03 RX ADMIN — HYDROXYZINE HYDROCHLORIDE 25 MG: 25 TABLET ORAL at 15:13

## 2025-05-03 RX ADMIN — TRAZODONE HYDROCHLORIDE 100 MG: 50 TABLET ORAL at 22:38

## 2025-05-03 NOTE — PLAN OF CARE
Goal Outcome Evaluation:         New admission.    Problem: Adult Inpatient Plan of Care  Goal: Plan of Care Review  Outcome: Progressing  Goal: Patient-Specific Goal (Individualized)  Outcome: Progressing  Goal: Absence of Hospital-Acquired Illness or Injury  Outcome: Progressing  Goal: Optimal Comfort and Wellbeing  Outcome: Progressing  Goal: Readiness for Transition of Care  Outcome: Progressing  Intervention: Mutually Develop Transition Plan  Description: Identify available resources for support (e.g., family, friends, community).Identify and address barriers to ongoing treatment and home management (e.g., environmental, financial).Provide opportunities to practice self-management skills.Assess and monitor emotional readiness for transition.Establish or reconnect linkage with outpatient providers or community-based services.  Recent Flowsheet Documentation  Taken 5/3/2025 1815 by Jhoana Shea RN  Equipment Currently Used at Home: walker, standard  Taken 5/3/2025 1812 by Jhoana Shea, EFRAÍN  Equipment Currently Used at Home: walker, standard  Taken 5/3/2025 1811 by Jhoana Shea RN  Transportation Anticipated: family or friend will provide  Patient/Family Anticipated Services at Transition: home health care  Patient/Family Anticipates Transition to: home with family  Taken 5/3/2025 1810 by Jhoana Shea RN  Equipment Currently Used at Home: walker, standard     Problem: Comorbidity Management  Goal: Maintenance of COPD Symptom Control  Outcome: Progressing     Problem: Skin Injury Risk Increased  Goal: Skin Health and Integrity  Outcome: Progressing

## 2025-05-03 NOTE — ED PROVIDER NOTES
"     [facility]  Emergency Department Encounter  Emergency Medicine Physician Note       Pt Name: Gabi Dudley  MRN: [id number]  Pt :   1945  Room Number:  W209/1  Date of encounter:  5/3/2025  PCP: Krystina Saunders DO  ED Physician: Ricardo Read DO    HPI:  Gabi Dudley is a 80 y.o. female who presents to the ED with chief complaint of shortness of breath.  Past medical history of CHF with decreased EF, COPD and chronic respiratory failure with hypoxia on baseline 4 L nasal cannula.  Patient reports she has felt short of breath for \"a while\".  States that symptoms worsened this morning.  States that she cannot catch her breath.  Duration constant.  No modifying factors.  Associated cough.  No fever, chills, chest pain, back pain, abdominal pain, problems with urination or bowel movements, leg pain or swelling.    Patient presents via EMS.  Vital signs stable en route.  No interventions reported.    PAST MEDICAL HISTORY  Past Medical History:   Diagnosis Date    Adrenal adenoma     Anemia     Arrhythmia     Asthma     Atrial fibrillation     Back pain     Benign colonic polyp     Benign tumor of adrenal gland     Cataract     bilateral    Cholelithiasis     Chronic bronchitis     Chronic bronchitis with COPD (chronic obstructive pulmonary disease)     COPD (chronic obstructive pulmonary disease)     Coronary artery disease     Depression     Diverticulosis Years ago    Elevated cholesterol     Environmental and seasonal allergies     Fibromyalgia     Fibromyalgia, primary Sometime in the     Gastritis     Generalized anxiety disorder     GERD (gastroesophageal reflux disease)     H/O mammogram     Headache Years ago    Hemorrhoids     History of blood transfusion     History of blood transfusion     History of echocardiogram     History of endometriosis     History of nuclear stress test     Hospitalization or health care facility admission within last 6 months     5 " times between 11/2022-05/2023    Hypertension     IBS (irritable bowel syndrome)     Impaired functional mobility, balance, gait, and endurance     Impaired mobility     Inverted nipple     Kidney disease     Kidney stone     Liver cyst     Low back pain Years ago    Nodular radiologic density     Nodule of left lung     NSTEMI (non-ST elevated myocardial infarction) 9/24/2024    On home oxygen therapy     2 liters NC QHS    Osteoarthritis     Osteopenia Several years ago    Osteoporosis     PONV (postoperative nausea and vomiting)     Renal cyst     Sinus problem     2014    Sinusitis     Skin cancer     basal cell carcinoma    SOB (shortness of breath)     Tobacco use     Urinary frequency     Urinary tract infection Years ago    Frequent UTIs    Vitamin D deficiency     Wears glasses     Wears partial dentures     upper plate     Current Outpatient Medications   Medication Instructions    albuterol (PROVENTIL) 2.5 mg, Nebulization, Every 4 Hours PRN    albuterol sulfate  (90 Base) MCG/ACT inhaler 2 puffs, Inhalation, Every 4 Hours PRN    amiodarone (PACERONE) 200 mg, Oral, Every 24 Hours Scheduled    amoxicillin-clavulanate (AUGMENTIN) 875-125 MG per tablet 1 tablet, Oral, 2 Times Daily    apixaban (ELIQUIS) 5 mg, Oral, Every 12 Hours Scheduled    atorvastatin (LIPITOR) 40 mg, Oral, Daily    benzonatate (TESSALON) 100 mg, Oral, 3 Times Daily PRN    Budeson-Glycopyrrol-Formoterol (Breztri Aerosphere) 160-9-4.8 MCG/ACT aerosol inhaler 2 puffs, Inhalation, 2 Times Daily, Rinse mouth out after use    clotrimazole-betamethasone (Lotrisone) 1-0.05 % cream 1 Application, Topical, 2 Times Daily    diazePAM (VALIUM) 5 mg, Oral, Every 8 Hours PRN    Diclofenac Sodium (VOLTAREN) 4 g, Topical, 4 Times Daily PRN    DULoxetine (CYMBALTA) 60 mg, Oral, 2 Times Daily    fluconazole (DIFLUCAN) 100 mg, Oral, Daily    fluticasone (FLONASE) 50 MCG/ACT nasal spray 2 sprays, Nasal, Daily    furosemide (LASIX) 20 mg, Oral, Daily     ipratropium-albuterol (DUO-NEB) 0.5-2.5 mg/3 ml nebulizer USE 3 mL VIA NEBULIZER EVERY 4 HOURS AS NEEDED FOR WHEEZING    levocetirizine (XYZAL) 5 mg, Oral, Every Evening    meclizine (ANTIVERT) 25 mg, Oral, 3 Times Daily PRN    methocarbamol (ROBAXIN) 500 mg, 4 Times Daily    metoprolol succinate XL (TOPROL-XL) 25 mg, Oral, Every 24 Hours Scheduled    naloxone (NARCAN) 4 mg    O2 (OXYGEN) 2 L/min, As Needed    ondansetron ODT (ZOFRAN-ODT) 8 mg, Translingual, Every 8 Hours PRN    oxyCODONE-acetaminophen (PERCOCET) 7.5-325 MG per tablet 1 tablet, Oral, Every 4 Hours PRN    pantoprazole (PROTONIX) 40 mg, Oral, Daily    prochlorperazine (COMPAZINE) 5 mg, Oral, Every 6 Hours PRN    QUEtiapine (SEROQUEL) 25 mg, Oral, Nightly    saline (AYR) gel nasal gel 1 Application, Topical, As Needed    sertraline (ZOLOFT) 100 MG tablet TAKE 1 & 1/2 TABLETS BY MOUTH DAILY    sodium chloride 0.65 % nasal spray 2 sprays, Nasal, As Needed    traZODone (DESYREL) 100 MG tablet TAKE 1 TABLET EVERY NIGHT AT BEDTIME BY MOUTH AS NEEDED    valsartan (DIOVAN) 40 mg, Oral, Daily      PAST SURGICAL HISTORY  Past Surgical History:   Procedure Laterality Date    APPENDECTOMY  1980s    CARDIAC CATHETERIZATION  2003    CATARACT EXTRACTION  2013    both eyes    CHOLECYSTECTOMY  2020    CHOLECYSTECTOMY WITH INTRAOPERATIVE CHOLANGIOGRAM N/A 10/30/2020    Procedure: CHOLECYSTECTOMY LAPAROSCOPIC INTRAOPERATIVE CHOLANGIOGRAPHY;  Surgeon: Sima Moses MD;  Location: [facility] OR;  Service: General;  Laterality: N/A;    COLON SURGERY  2020    COLONOSCOPY  03/11/2013    COLONOSCOPY  06/21/2016    COLONOSCOPY N/A 07/24/2019    Procedure: COLONOSCOPY W/ COLD FORCEP POLYPECTOMIES; HOT SNARE POLYPECTOMIES; COLD SNARE POLYPECTOMY;  Surgeon: Goyo Nunez MD;  Location: [facility] ENDOSCOPY;  Service: Gastroenterology    COLONOSCOPY W/ BIOPSIES AND POLYPECTOMY      EXPLORATORY LAPAROTOMY N/A 11/23/2020    Procedure: colectomy, right, closure of enterotomy x 2,  reduction of internal volvulus;  Surgeon: Sima Moses MD;  Location: [facility] OR;  Service: General;  Laterality: N/A;    EXPLORATORY LAPAROTOMY N/A 2023    Procedure: LAPAROTOMY EXPLORATORY WITH LYIS OF ADHESIONS AND  CENTRAL LINE INSERTION;  Surgeon: Bianca Isaac DO;  Location: [facility] OR;  Service: General;  Laterality: N/A;    HYSTERECTOMY      partial    LYSIS OF ABDOMINAL ADHESIONS      TONSILLECTOMY      UPPER GASTROINTESTINAL ENDOSCOPY  2015    VAGINAL DELIVERY      x2       FAMILY HISTORY  Family History   Problem Relation Age of Onset    Stomach cancer Brother     Colon cancer Maternal Aunt     Brain cancer Father     Arthritis Father     Hypertension Father     Cancer Father         Father, brother, and aunt    Lung cancer Paternal Grandfather     Heart disease Mother         Mother passed away due to heart disease    Breast cancer Neg Hx     Ovarian cancer Neg Hx        SOCIAL HISTORY  Social History     Socioeconomic History    Marital status:    Tobacco Use    Smoking status: Former     Current packs/day: 0.00     Average packs/day: 0.3 packs/day for 30.0 years (7.5 ttl pk-yrs)     Types: Cigarettes     Start date: 1990     Quit date: 2020     Years since quittin.5     Passive exposure: Past    Smokeless tobacco: Never   Vaping Use    Vaping status: Never Used   Substance and Sexual Activity    Alcohol use: No    Drug use: No    Sexual activity: Not Currently     Birth control/protection: Post-menopausal     ALLERGIES  Ativan [lorazepam] and Doxycycline    REVIEW OF SYSTEMS  All systems reviewed and negative except for those discussed in HPI.     PHYSICAL EXAM  ED Triage Vitals [25 1401]   Temp Heart Rate Resp BP SpO2   -- 79 24 133/78 95 %      Temp src Heart Rate Source Patient Position BP Location FiO2 (%)   Oral Monitor Lying Left arm --     I have reviewed the triage vital signs and nursing notes.    General: Alert.  Chronically ill, but  nontoxic.  No acute distress.  Head: Normocephalic.  Atraumatic.  Eyes: No scleral icterus.  ENT: Moist mucous membranes.  Cardiovascular: Regular rate and rhythm.  No murmurs.  No rubs.  2+ distal pulses bilaterally.  Respiratory: No wheezing. No rales.  No rhonchi.  Decreased air entry bilateral bases.  Mild conversational dyspnea.  No respiratory distress.  GI: Abdomen is soft.  Nondistended.  Nontender to palpation.  No rebound.  No guarding.  No CVA tenderness.  MSK: Moves all 4 extremities.  Neurologic: Oriented x 3.  No focal deficits.  Skin: No edema. No erythema. No pallor. No cyanosis.  Psych: Normal mood and affect.    LAB RESULTS  Recent Results (from the past 24 hours)   Comprehensive Metabolic Panel    Collection Time: 05/03/25  2:06 PM    Specimen: Blood   Result Value Ref Range    Glucose 127 (H) 65 - 99 mg/dL    BUN 17 8 - 23 mg/dL    Creatinine 0.57 0.57 - 1.00 mg/dL    Sodium 143 136 - 145 mmol/L    Potassium 4.3 3.5 - 5.2 mmol/L    Chloride 96 (L) 98 - 107 mmol/L    CO2 45.8 (H) 22.0 - 29.0 mmol/L    Calcium 9.3 8.6 - 10.5 mg/dL    Total Protein 6.4 6.0 - 8.5 g/dL    Albumin 3.7 3.5 - 5.2 g/dL    ALT (SGPT) 19 1 - 33 U/L    AST (SGOT) 29 1 - 32 U/L    Alkaline Phosphatase 96 39 - 117 U/L    Total Bilirubin 0.2 0.0 - 1.2 mg/dL    Globulin 2.7 gm/dL    A/G Ratio 1.4 g/dL    BUN/Creatinine Ratio 29.8 (H) 7.0 - 25.0    Anion Gap 1.2 (L) 5.0 - 15.0 mmol/L    eGFR 93.2 >60.0 mL/min/1.73   BNP    Collection Time: 05/03/25  2:06 PM    Specimen: Blood   Result Value Ref Range    proBNP 2,052.0 (H) 0.0 - 1,800.0 pg/mL   High Sensitivity Troponin T    Collection Time: 05/03/25  2:06 PM    Specimen: Blood   Result Value Ref Range    HS Troponin T 13 <14 ng/L   Green Top (Gel)    Collection Time: 05/03/25  2:06 PM   Result Value Ref Range    Extra Tube Hold for add-ons.    Lavender Top    Collection Time: 05/03/25  2:06 PM   Result Value Ref Range    Extra Tube hold for add-on    Gold Top - SST    Collection  Time: 05/03/25  2:06 PM   Result Value Ref Range    Extra Tube Hold for add-ons.    Light Blue Top    Collection Time: 05/03/25  2:06 PM   Result Value Ref Range    Extra Tube Hold for add-ons.    CBC Auto Differential    Collection Time: 05/03/25  2:06 PM    Specimen: Blood   Result Value Ref Range    WBC 4.95 3.40 - 10.80 10*3/mm3    RBC 3.80 3.77 - 5.28 10*6/mm3    Hemoglobin 10.0 (L) 12.0 - 15.9 g/dL    Hematocrit 35.7 34.0 - 46.6 %    MCV 93.9 79.0 - 97.0 fL    MCH 26.3 (L) 26.6 - 33.0 pg    MCHC 28.0 (L) 31.5 - 35.7 g/dL    RDW 14.9 12.3 - 15.4 %    RDW-SD 51.6 37.0 - 54.0 fl    MPV 11.2 6.0 - 12.0 fL    Platelets 167 140 - 450 10*3/mm3    Neutrophil % 68.7 42.7 - 76.0 %    Lymphocyte % 22.6 19.6 - 45.3 %    Monocyte % 7.1 5.0 - 12.0 %    Eosinophil % 0.6 0.3 - 6.2 %    Basophil % 0.6 0.0 - 1.5 %    Immature Grans % 0.4 0.0 - 0.5 %    Neutrophils, Absolute 3.40 1.70 - 7.00 10*3/mm3    Lymphocytes, Absolute 1.12 0.70 - 3.10 10*3/mm3    Monocytes, Absolute 0.35 0.10 - 0.90 10*3/mm3    Eosinophils, Absolute 0.03 0.00 - 0.40 10*3/mm3    Basophils, Absolute 0.03 0.00 - 0.20 10*3/mm3    Immature Grans, Absolute 0.02 0.00 - 0.05 10*3/mm3    nRBC 0.0 0.0 - 0.2 /100 WBC   Scan Slide    Collection Time: 05/03/25  2:06 PM    Specimen: Blood   Result Value Ref Range    Hypochromia Slight/1+ None Seen    WBC Morphology Normal Normal    Platelet Estimate Adequate Normal   Respiratory Panel PCR w/COVID-19(SARS-CoV-2) SAMMIE/NAMITA/GREGORIO/PAD/COR/KRYSTAL In-House, NP Swab in [facility], 2 HR TAT - Swab, Nasopharynx    Collection Time: 05/03/25  2:50 PM    Specimen: Nasopharynx; Swab   Result Value Ref Range    ADENOVIRUS, PCR Not Detected Not Detected    Coronavirus 229E Not Detected Not Detected    Coronavirus HKU1 Not Detected Not Detected    Coronavirus NL63 Not Detected Not Detected    Coronavirus OC43 Not Detected Not Detected    COVID19 Not Detected Not Detected - Ref. Range    Human Metapneumovirus Not Detected Not Detected    Human  Rhinovirus/Enterovirus Not Detected Not Detected    Influenza A PCR Not Detected Not Detected    Influenza B PCR Not Detected Not Detected    Parainfluenza Virus 1 Not Detected Not Detected    Parainfluenza Virus 2 Not Detected Not Detected    Parainfluenza Virus 3 Not Detected Not Detected    Parainfluenza Virus 4 Not Detected Not Detected    RSV, PCR Not Detected Not Detected    Bordetella pertussis pcr Not Detected Not Detected    Bordetella parapertussis PCR Not Detected Not Detected    Chlamydophila pneumoniae PCR Not Detected Not Detected    Mycoplasma pneumo by PCR Not Detected Not Detected   High Sensitivity Troponin T 1Hr    Collection Time: 05/03/25  3:16 PM    Specimen: Blood   Result Value Ref Range    HS Troponin T 13 <14 ng/L    Troponin T Numeric Delta 0 Abnormal if >/=3 ng/L   Blood Gas, Arterial With Co-Ox    Collection Time: 05/03/25  4:13 PM    Specimen: Arterial Blood   Result Value Ref Range    Site Left Radial     Glenn's Test Positive     pH, Arterial 7.350 7.300 - 7.500 pH units    pCO2, Arterial 85.4 (C) 35.0 - 45.0 mm Hg    pO2, Arterial 74.2 (L) 75.0 - 100.0 mm Hg    HCO3, Arterial 47.1 (H) 22.0 - 28.0 mmol/L    Base Excess, Arterial 18.4 (H) 0.0 - 2.0 mmol/L    O2 Saturation, Arterial 96.0 94.0 - 100.0 %    Hematocrit, Blood Gas 29.3 %    Oxyhemoglobin 94.2 94 - 99 %    Methemoglobin 0.80 0.00 - 1.50 %    Carboxyhemoglobin 1.1 0 - 2 %    A-a DO2 76.6 mmHg    Barometric Pressure for Blood Gas 728 mmHg    Modality Nasal Cannula     FIO2 36 %    Flow Rate 4.0 lpm    Ventilator Mode NA     Notified Who MD     Notified By [id number]     Notified Time 05/03/2025 16:12     Collected by SB     pH, Temp Corrected      pCO2, Temperature Corrected      pO2, Temperature Corrected     Basic Metabolic Panel    Collection Time: 05/04/25  6:25 AM    Specimen: Blood   Result Value Ref Range    Glucose 135 (H) 65 - 99 mg/dL    BUN 23 8 - 23 mg/dL    Creatinine 0.65 0.57 - 1.00 mg/dL    Sodium 143 136 - 145  mmol/L    Potassium 4.3 3.5 - 5.2 mmol/L    Chloride 95 (L) 98 - 107 mmol/L    CO2 43.7 (H) 22.0 - 29.0 mmol/L    Calcium 9.1 8.6 - 10.5 mg/dL    BUN/Creatinine Ratio 35.4 (H) 7.0 - 25.0    Anion Gap 4.3 (L) 5.0 - 15.0 mmol/L    eGFR 90.2 >60.0 mL/min/1.73   CBC Auto Differential    Collection Time: 05/04/25  6:25 AM    Specimen: Blood   Result Value Ref Range    WBC 5.37 3.40 - 10.80 10*3/mm3    RBC 3.62 (L) 3.77 - 5.28 10*6/mm3    Hemoglobin 9.5 (L) 12.0 - 15.9 g/dL    Hematocrit 32.8 (L) 34.0 - 46.6 %    MCV 90.6 79.0 - 97.0 fL    MCH 26.2 (L) 26.6 - 33.0 pg    MCHC 29.0 (L) 31.5 - 35.7 g/dL    RDW 14.8 12.3 - 15.4 %    RDW-SD 49.3 37.0 - 54.0 fl    MPV 11.1 6.0 - 12.0 fL    Platelets 160 140 - 450 10*3/mm3    Neutrophil % 89.0 (H) 42.7 - 76.0 %    Lymphocyte % 8.0 (L) 19.6 - 45.3 %    Monocyte % 2.4 (L) 5.0 - 12.0 %    Eosinophil % 0.0 (L) 0.3 - 6.2 %    Basophil % 0.0 0.0 - 1.5 %    Immature Grans % 0.6 (H) 0.0 - 0.5 %    Neutrophils, Absolute 4.78 1.70 - 7.00 10*3/mm3    Lymphocytes, Absolute 0.43 (L) 0.70 - 3.10 10*3/mm3    Monocytes, Absolute 0.13 0.10 - 0.90 10*3/mm3    Eosinophils, Absolute 0.00 0.00 - 0.40 10*3/mm3    Basophils, Absolute 0.00 0.00 - 0.20 10*3/mm3    Immature Grans, Absolute 0.03 0.00 - 0.05 10*3/mm3    nRBC 0.0 0.0 - 0.2 /100 WBC   ECG 12 Lead Rhythm Change    Collection Time: 05/04/25 10:56 AM   Result Value Ref Range    QT Interval 344 ms    QTC Interval 474 ms   Adult Transthoracic Echo Complete W/ Cont if Necessary Per Protocol    Collection Time: 05/04/25 11:05 AM   Result Value Ref Range    EF(MOD-bp) 57.3 %    LVIDd 4.2 cm    LVIDs 2.30 cm    IVSd 1.35 cm    LVPWd 0.90 cm    FS 44.7 %    IVS/LVPW 1.50 cm    ESV(cubed) 12.2 ml    LV Sys Vol (BSA corrected) 13.3 cm2    EDV(cubed) 72.0 ml    LV Kelley Vol (BSA corrected) 34.5 cm2    LV mass(C)d 159.8 grams    LVOT area 2.06 cm2    LVOT diam 1.62 cm    EDV(MOD-sp2) 31.7 ml    EDV(MOD-sp4) 57.0 ml    ESV(MOD-sp2) 15.7 ml    ESV(MOD-sp4)  22.0 ml    SV(MOD-sp2) 16.0 ml    SV(MOD-sp4) 35.0 ml    SVi(MOD-SP2) 9.7 ml/m2    SVi(MOD-SP4) 21.2 ml/m2    SVi (LVOT) 23.7 ml/m2    EF(MOD-sp2) 50.5 %    EF(MOD-sp4) 61.4 %    MV E max brennon 113.0 cm/sec    MV dec time 0.15 sec    LA ESV Index (BP) 35.9 ml/m2    Med Peak E' Brennon 8.1 cm/sec    Lat Peak E' Brennon 10.8 cm/sec    TR max brennon 228.0 cm/sec    Avg E/e' ratio 11.96     SV(LVOT) 39.2 ml    RV Base 3.6 cm    RV Mid 2.46 cm    TAPSE (>1.6) 1.09 cm    RV S' 9.3 cm/sec    LA dimension (2D)  3.3 cm    LV V1 max 137.0 cm/sec    LV V1 max PG 7.5 mmHg    LV V1 mean PG 4.0 mmHg    LV V1 VTI 19.0 cm    Ao pk brennon 201.0 cm/sec    Ao max PG 16.2 mmHg    Ao mean PG 8.0 mmHg    Ao V2 VTI 27.7 cm    CHERRIE(I,D) 1.41 cm2    Dimensionless Index 0.69 (DI)    MV max PG 6.5 mmHg    MV mean PG 3.0 mmHg    MV V2 VTI 12.4 cm    MVA(VTI) 3.2 cm2    MV dec slope 758.0 cm/sec2    TR max PG 20.8 mmHg    RVSP(TR) 23.8 mmHg    RAP systole 3.0 mmHg    RV V1 max PG 2.40 mmHg    RV V1 max 77.4 cm/sec    RV V1 VTI 10.2 cm    PA V2 max 119.0 cm/sec    PA acc time 0.05 sec    Ao root diam 2.47 cm     CV ECHO SHUNT ASSESSMENT PERFORMED (HIDDEN SCRIPTING) 1        RADIOLOGY  Adult Transthoracic Echo Complete W/ Cont if Necessary Per Protocol  Result Date: 5/4/2025    Left ventricular systolic function is normal. Calculated left ventricular EF = 57.3%   Left ventricular wall thickness is consistent with mild septal asymmetric hypertrophy.   Left ventricular diastolic function was indeterminate.   Moderately reduced right ventricular systolic function noted.   Left atrial volume is mildly increased.   Mild aortic valve stenosis is present.   Estimated right ventricular systolic pressure from tricuspid regurgitation is normal (<35 mmHg).   Saline test results are negative for intracardiac shunting.     XR Chest 1 View  Result Date: 5/3/2025  PROCEDURE: XR CHEST 1 VW-  INDICATION:  SOA Triage Protocol  FINDINGS:  A portable view of the chest was  obtained.  Comparison is made to a prior exam dated 4/9/2025.   There is stable mild cardiomegaly. There is been slight worsening of bilateral interstitial opacities. This may represent pulmonary edema. No pleural effusion or pneumothorax.      Worsening interstitial opacities. Favor pulmonary edema.   This report was signed and finalized on 5/3/2025 2:36 PM by Loan Carbone MD.        PROCEDURES  Procedures    RISK STRATIFICATION    MEDICAL DECISION MAKING  80 y.o. female with past medical history listed above who presents with shortness of breath.    Patient arrives via EMS.  I have reviewed the EMS documentation/notes and included that information in my HPI.    Vital signs within normal limits.  Pulse ox 94% on baseline 4 L nasal cannula.    Based on clinical presentation and physical exam, differential diagnosis includes, but is not limited to, COPD exacerbation, CHF exacerbation, pneumonia, pneumothorax, acute coronary syndrome, viral URI/bronchitis.    At least 3 different tests have been ordered on this patient.    Medications administered in ED:  Medications   sodium chloride 0.9 % flush 10 mL (has no administration in time range)   nitroglycerin (NITROSTAT) SL tablet 0.4 mg (has no administration in time range)   sodium chloride 0.9 % flush 10 mL (10 mL Intravenous Given 5/4/25 0925)   sodium chloride 0.9 % flush 10 mL (has no administration in time range)   sodium chloride 0.9 % infusion 40 mL (has no administration in time range)   ondansetron (ZOFRAN) injection 4 mg (4 mg Intravenous Given 5/4/25 1216)   furosemide (LASIX) injection 60 mg (60 mg Intravenous Given 5/4/25 0924)   Potassium Replacement - Follow Nurse / BPA Driven Protocol (has no administration in time range)   Magnesium Cardiology Dose Replacement - Follow Nurse / BPA Driven Protocol (has no administration in time range)   Phosphorus Replacement - Follow Nurse / BPA Driven Protocol (has no administration in time range)   Calcium  Replacement - Follow Nurse / BPA Driven Protocol (has no administration in time range)   acetaminophen (TYLENOL) tablet 650 mg (650 mg Oral Given 5/4/25 1057)     Or   acetaminophen (TYLENOL) 160 MG/5ML oral solution 650 mg ( Oral Not Given:  See Alt 5/4/25 1057)     Or   acetaminophen (TYLENOL) suppository 650 mg ( Rectal Not Given:  See Alt 5/4/25 1057)   piperacillin-tazobactam (ZOSYN) IVPB 3.375 g IVPB in 100 mL NS (VTB) (3.375 g Intravenous New Bag 5/4/25 0921)   methylPREDNISolone sodium succinate (SOLU-Medrol) injection 62.5 mg (62.5 mg Intravenous Given 5/4/25 0924)   ipratropium-albuterol (DUO-NEB) nebulizer solution 3 mL (has no administration in time range)   arformoterol (BROVANA) nebulizer solution 15 mcg (15 mcg Nebulization Given 5/4/25 0705)     And   budesonide (PULMICORT) nebulizer solution 0.5 mg (0.5 mg Nebulization Given 5/4/25 0705)     And   revefenacin (YUPELRI) nebulizer solution 175 mcg (175 mcg Nebulization Given 5/4/25 0705)   sennosides-docusate (PERICOLACE) 8.6-50 MG per tablet 2 tablet (has no administration in time range)     And   polyethylene glycol (MIRALAX) packet 17 g (has no administration in time range)     And   bisacodyl (DULCOLAX) EC tablet 5 mg (has no administration in time range)     And   bisacodyl (DULCOLAX) suppository 10 mg (has no administration in time range)   guaiFENesin (MUCINEX) 12 hr tablet 600 mg (600 mg Oral Given 5/4/25 0924)   apixaban (ELIQUIS) tablet 5 mg (5 mg Oral Given 5/4/25 0924)   metoprolol succinate XL (TOPROL-XL) 24 hr tablet 25 mg (25 mg Oral Given 5/4/25 0925)   amiodarone (PACERONE) tablet 200 mg (200 mg Oral Given 5/4/25 0924)   atorvastatin (LIPITOR) tablet 40 mg (40 mg Oral Given 5/4/25 0925)   DULoxetine (CYMBALTA) DR capsule 60 mg (60 mg Oral Given 5/4/25 0924)   valsartan (DIOVAN) tablet 40 mg ( Oral Dose Auto Held 5/12/25 0900)   diazePAM (VALIUM) tablet 5 mg (5 mg Oral Given 5/3/25 2046)   traZODone (DESYREL) tablet 100 mg (100 mg Oral  Given 5/3/25 2238)   oxyCODONE-acetaminophen (PERCOCET) 7.5-325 MG per tablet 1 tablet (1 tablet Oral Given 5/4/25 0724)   dilTIAZem (CARDIZEM) tablet 30 mg (30 mg Oral Given 5/4/25 1247)   albuterol (PROVENTIL) nebulizer solution 0.083% 2.5 mg/3mL (10 mg Nebulization Given 5/3/25 1418)   ipratropium (ATROVENT) nebulizer solution 1 mg (1 mg Nebulization Given 5/3/25 1418)   methylPREDNISolone sodium succinate (SOLU-Medrol) injection 125 mg (125 mg Intravenous Given 5/3/25 1413)   hydrOXYzine (ATARAX) tablet 25 mg (25 mg Oral Given 5/3/25 1513)   furosemide (LASIX) injection 40 mg (40 mg Intravenous Given 5/3/25 1617)   piperacillin-tazobactam (ZOSYN) IVPB 3.375 g IVPB in 100 mL NS (VTB) (3.375 g Intravenous New Bag 5/3/25 1751)     Please see ED course below for my interpretation of the ED workup.  ED Course as of 05/04/25 1340   Sat May 03, 2025   1415 ECG 12 Lead Dyspnea  EKG per my interpretation normal sinus rhythm, rate 79, normal axis, no STEMI, normal QRS QTC intervals.   [JS]   1429 XR Chest 1 View  I have independently reviewed and interpreted the chest x-ray.  My interpretation is negative pneumothorax.    Radiologist notes the following: Worsening interstitial opacities. Favor pulmonary edema. [JS]   1539 I reviewed the labs listed above.     Notable findings are highlighted below.    Old laboratory data was reviewed from the medical records and compared to today's results.   [JS]   1539 Hemoglobin(!): 10.0 [JS]   1539 Glucose(!): 127 [JS]   1540 proBNP(!): 2,052.0 [JS]   1630 On re-evaluation, patient resting comfortably. States symptoms have improved following nebulizer therapy. Vital signs remained stable on baseline oxygen. Rx IV lasix for pulmonary edema. Will proceed with medical admission for further workup and management.  I discussed the findings of the ED workup with the patient and her daughter including my recommendation for admission.  Patient agreeable with plan and disposition.    Case  discussed with Dr. Brewer (hospitalist) who agrees to evaluate and admit the patient.  We discussed the HPI, pertinent PMHx, ED course and workup.    Chronic conditions affecting care: COPD, CHF    Social determinants of health impacting treatment or disposition: None [JS]      ED Course User Index  [JS] Ricardo Read DO     REPEAT VITAL SIGNS  AS OF 13:40 EDT VITALS:  BP - 99/61  HR - 78  TEMP - 98.1 °F (36.7 °C) (Tympanic)  O2 [facility] - 94%    DIAGNOSIS  Final diagnoses:   Acute on chronic systolic congestive heart failure   COPD exacerbation     DISPOSITION  ED Disposition       ED Disposition   Decision to Admit    Condition   --    Comment   Level of Care: Telemetry [5]   Diagnosis: COPD exacerbation [592174]   Admitting Physician: LASHON BREWER [[id number]]   Attending Physician: LASHON BREWER [[id number]]               Please note that portions of this document were completed with voice recognition software.        Ricardo Read DO  05/04/25 1343

## 2025-05-03 NOTE — H&P
"  [facility]      Name:  Gabi Dudley   Age:  80 y.o.  Sex:  female  :  1945  MRN:  [id number]   Visit Number:  [id number]  Admission Date:  5/3/2025  Date Of Service:  25  Primary Care Physician:  Krystina Saunders DO    Chief Complaint:     Dyspnea    History Of Presenting Illness:      Patient is a 80-year-old female brought to the emergency department for evaluation of dyspnea.  Patient has a known history of chronic respiratory failure, is on baseline 4 L nasal cannula.  Patient reports that she has been short of breath for approximately 2 weeks duration.  She was recently seen by a provider, who has started her on antibiotics.  She states that she has completed 1 round of azithromycin, started on Augmentin yesterday.  She denies any improvement despite these oral antibiotics.  She states she \"cannot catch my breath\".  No alleviating factors, exacerbated with exertion or ambulation.  Denies any recent changes in medications, sick contacts, travel history.  Workup in the ED showed CHF and COPD exacerbations.  Hospitalist services consulted for admission.    Review Of Systems:    All systems were reviewed and negative except as mentioned in history of presenting illness, assessment and plan.    Past Medical History: Patient  has a past medical history of Adrenal adenoma, Anemia, Arrhythmia, Asthma, Atrial fibrillation, Back pain, Benign colonic polyp, Benign tumor of adrenal gland, Cataract, Cholelithiasis, Chronic bronchitis, Chronic bronchitis with COPD (chronic obstructive pulmonary disease), COPD (chronic obstructive pulmonary disease) (), Coronary artery disease, Depression, Diverticulosis (Years ago), Elevated cholesterol, Environmental and seasonal allergies, Fibromyalgia, Fibromyalgia, primary (Sometime in the ), Gastritis, Generalized anxiety disorder, GERD (gastroesophageal reflux disease), H/O mammogram, Headache (Years ago), " Hemorrhoids, History of blood transfusion (1985), History of blood transfusion (1985), History of echocardiogram, History of endometriosis, History of nuclear stress test, Hospitalization or health care facility admission within last 6 months, Hypertension, IBS (irritable bowel syndrome), Impaired functional mobility, balance, gait, and endurance, Impaired mobility, Inverted nipple, Kidney disease, Kidney stone, Liver cyst, Low back pain (Years ago), Nodular radiologic density, Nodule of left lung, NSTEMI (non-ST elevated myocardial infarction) (9/24/2024), On home oxygen therapy, Osteoarthritis, Osteopenia (Several years ago), Osteoporosis, PONV (postoperative nausea and vomiting), Renal cyst, Sinus problem, Sinusitis, Skin cancer, SOB (shortness of breath), Tobacco use, Urinary frequency, Urinary tract infection (Years ago), Vitamin D deficiency, Wears glasses, and Wears partial dentures.    Past Surgical History: Patient  has a past surgical history that includes Appendectomy (1980s); Tonsillectomy (1987); Colonoscopy w/ biopsies and polypectomy; Colonoscopy (03/11/2013); Colonoscopy (06/21/2016); Upper gastrointestinal endoscopy (01/13/2015); Vaginal delivery; Colonoscopy (N/A, 07/24/2019); Cataract extraction (2013); Hysterectomy (1980s); Cardiac catheterization (2003); Abdominal adhesion surgery; cholecystectomy with intraoperative cholangiogram (N/A, 10/30/2020); Exploratory Laparotomy (N/A, 11/23/2020); Cholecystectomy (2020); Colon surgery (2020); and Exploratory Laparotomy (N/A, 8/9/2023).    Social History: Patient  reports that she quit smoking about 4 years ago. Her smoking use included cigarettes. She started smoking about 34 years ago. She has a 7.5 pack-year smoking history. She has been exposed to tobacco smoke. She has never used smokeless tobacco. She reports that she does not drink alcohol and does not use drugs.    Family History:  Patient's family history has been reviewed and found to be  noncontributory.     Allergies:      Ativan [lorazepam] and Doxycycline    Home Medications:    Prior to Admission Medications       Prescriptions Last Dose Informant Patient Reported? Taking?    albuterol (PROVENTIL) (2.5 MG/3ML) 0.083% nebulizer solution   No No    Take 2.5 mg by nebulization Every 4 (Four) Hours As Needed for Wheezing.    albuterol sulfate  (90 Base) MCG/ACT inhaler   No No    Inhale 2 puffs Every 4 (Four) Hours As Needed for Wheezing.    amiodarone (PACERONE) 200 MG tablet   No No    Take 1 tablet by mouth Daily.    amoxicillin-clavulanate (AUGMENTIN) 875-125 MG per tablet   No No    Take 1 tablet by mouth 2 (Two) Times a Day.    apixaban (Eliquis) 5 MG tablet tablet   No No    Take 1 tablet by mouth Every 12 (Twelve) Hours.    atorvastatin (Lipitor) 40 MG tablet   No No    Take 1 tablet by mouth Daily.    benzonatate (TESSALON) 100 MG capsule   No No    Take 1 capsule by mouth 3 (Three) Times a Day As Needed for Cough.    Budeson-Glycopyrrol-Formoterol (Breztri Aerosphere) 160-9-4.8 MCG/ACT aerosol inhaler   No No    Inhale 2 puffs 2 (Two) Times a Day. Rinse mouth out after use    clotrimazole-betamethasone (Lotrisone) 1-0.05 % cream   No No    Apply 1 Application topically to the appropriate area as directed 2 (Two) Times a Day.    diazePAM (Valium) 5 MG tablet   No No    Take 1 tablet by mouth Every 8 (Eight) Hours As Needed for Anxiety.    Diclofenac Sodium (VOLTAREN) 1 % gel gel   No No    Apply 4 g topically to the appropriate area as directed 4 (Four) Times a Day As Needed (pain).    DULoxetine (CYMBALTA) 60 MG capsule   No No    TAKE 1 CAPSULE BY MOUTH 2 (TWO) TIMES A DAY.    fluconazole (Diflucan) 100 MG tablet   No No    Take 1 tablet by mouth Daily.    fluticasone (FLONASE) 50 MCG/ACT nasal spray   No No    Administer 2 sprays into the nostril(s) as directed by provider Daily.    furosemide (LASIX) 20 MG tablet   No No    Take 1 tablet by mouth Daily for 2 days.     ipratropium-albuterol (DUO-NEB) 0.5-2.5 mg/3 ml nebulizer   No No    USE 3 mL VIA NEBULIZER EVERY 4 HOURS AS NEEDED FOR WHEEZING    levocetirizine (XYZAL) 5 MG tablet   No No    Take 1 tablet by mouth Every Evening.    meclizine (ANTIVERT) 25 MG tablet   No No    Take 1 tablet by mouth 3 (Three) Times a Day As Needed for Dizziness.    methocarbamol (ROBAXIN) 500 MG tablet   Yes No    Take 1 tablet by mouth 4 (Four) Times a Day.    metoprolol succinate XL (TOPROL-XL) 25 MG 24 hr tablet   No No    Take 1 tablet by mouth Daily.    naloxone (NARCAN) 4 MG/0.1ML nasal spray   Yes No    Administer 1 spray into the nostril(s) as directed by provider.    O2 (OXYGEN)   Yes No    Inhale 2 L/min As Needed.    ondansetron ODT (ZOFRAN-ODT) 8 MG disintegrating tablet   No No    Place 1 tablet on the tongue Every 8 (Eight) Hours As Needed for Nausea.    oxyCODONE-acetaminophen (PERCOCET) 7.5-325 MG per tablet   No No    Take 1 tablet by mouth Every 4 (Four) Hours As Needed for Moderate Pain or Severe Pain for up to 5 doses.    pantoprazole (PROTONIX) 40 MG EC tablet   No No    Take 1 tablet by mouth Daily.    prochlorperazine (COMPAZINE) 5 MG tablet   No No    Take 1 tablet by mouth Every 6 (Six) Hours As Needed for Nausea or Vomiting.    QUEtiapine (SEROquel) 25 MG tablet   No No    Take 1 tablet by mouth Every Night.    saline (AYR) gel nasal gel   No No    Apply 1 Application topically to the appropriate area as directed As Needed (nasal dryness nose bleeds).    sertraline (ZOLOFT) 100 MG tablet   No No    TAKE 1 & 1/2 TABLETS BY MOUTH DAILY    sodium chloride 0.65 % nasal spray   No No    2 sprays into the nostril(s) as directed by provider As Needed (dry nose).    traZODone (DESYREL) 100 MG tablet   No No    TAKE 1 TABLET EVERY NIGHT AT BEDTIME BY MOUTH AS NEEDED    valsartan (Diovan) 40 MG tablet   No No    Take 1 tablet by mouth Daily.          ED Medications:    Medications   sodium chloride 0.9 % flush 10 mL (has no  administration in time range)   nitroglycerin (NITROSTAT) SL tablet 0.4 mg (has no administration in time range)   sodium chloride 0.9 % flush 10 mL (has no administration in time range)   sodium chloride 0.9 % flush 10 mL (has no administration in time range)   sodium chloride 0.9 % infusion 40 mL (has no administration in time range)   ondansetron (ZOFRAN) injection 4 mg (has no administration in time range)   furosemide (LASIX) injection 60 mg (has no administration in time range)   enoxaparin sodium (LOVENOX) syringe 40 mg (has no administration in time range)   Potassium Replacement - Follow Nurse / BPA Driven Protocol (has no administration in time range)   Magnesium Cardiology Dose Replacement - Follow Nurse / BPA Driven Protocol (has no administration in time range)   Phosphorus Replacement - Follow Nurse / BPA Driven Protocol (has no administration in time range)   Calcium Replacement - Follow Nurse / BPA Driven Protocol (has no administration in time range)   acetaminophen (TYLENOL) tablet 650 mg (has no administration in time range)     Or   acetaminophen (TYLENOL) 160 MG/5ML oral solution 650 mg (has no administration in time range)     Or   acetaminophen (TYLENOL) suppository 650 mg (has no administration in time range)   piperacillin-tazobactam (ZOSYN) IVPB 3.375 g IVPB in 100 mL NS (VTB) (has no administration in time range)   piperacillin-tazobactam (ZOSYN) IVPB 3.375 g IVPB in 100 mL NS (VTB) (has no administration in time range)   methylPREDNISolone sodium succinate (SOLU-Medrol) injection 62.5 mg (has no administration in time range)   ipratropium-albuterol (DUO-NEB) nebulizer solution 3 mL (has no administration in time range)   arformoterol (BROVANA) nebulizer solution 15 mcg (has no administration in time range)     And   budesonide (PULMICORT) nebulizer solution 0.5 mg (has no administration in time range)     And   revefenacin (YUPELRI) nebulizer solution 175 mcg (has no administration in  "time range)   sennosides-docusate (PERICOLACE) 8.6-50 MG per tablet 2 tablet (has no administration in time range)     And   polyethylene glycol (MIRALAX) packet 17 g (has no administration in time range)     And   bisacodyl (DULCOLAX) EC tablet 5 mg (has no administration in time range)     And   bisacodyl (DULCOLAX) suppository 10 mg (has no administration in time range)   guaiFENesin (MUCINEX) 12 hr tablet 600 mg (has no administration in time range)   albuterol (PROVENTIL) nebulizer solution 0.083% 2.5 mg/3mL (10 mg Nebulization Given 5/3/25 1418)   ipratropium (ATROVENT) nebulizer solution 1 mg (1 mg Nebulization Given 5/3/25 1418)   methylPREDNISolone sodium succinate (SOLU-Medrol) injection 125 mg (125 mg Intravenous Given 5/3/25 1413)   hydrOXYzine (ATARAX) tablet 25 mg (25 mg Oral Given 5/3/25 1513)   furosemide (LASIX) injection 40 mg (40 mg Intravenous Given 5/3/25 1617)     Vital Signs:  Temp:  [98.2 °F (36.8 °C)] 98.2 °F (36.8 °C)  Heart Rate:  [69-82] 82  Resp:  [22-24] 22  BP: (106-133)/(57-82) 110/57        05/03/25  1401   Weight: 59 kg (130 lb)     Body mass index is 21.63 kg/m².    Physical Exam:     Most recent vital Signs: /57   Pulse 82   Temp 98.2 °F (36.8 °C) (Oral)   Resp 22   Ht 165.1 cm (65\")   Wt 59 kg (130 lb)   SpO2 93%   BMI 21.63 kg/m²     Physical Exam  Constitutional: Awake, alert.  Conversationally dyspneic.  Chronically ill-appearing.  Eyes: PERRLA, sclerae anicteric, no conjunctival injection  HENT: NCAT, mucous membranes moist  Neck: Supple, no thyromegaly, no lymphadenopathy, trachea midline  Respiratory: Poor breath sounds in all lung fields, scattered wheezing at the apices, faint Rales at the right lung base, and or costal accessory muscle use noted  Cardiovascular: RRR, no murmurs, rubs, or gallops, palpable pedal pulses bilaterally  Gastrointestinal: Positive bowel sounds, soft, nontender, nondistended  Musculoskeletal: No bilateral ankle edema, no clubbing or " cyanosis to extremities  Psychiatric: Appropriate affect, cooperative  Neurologic: Oriented x 3, strength symmetric in all extremities, Cranial Nerves grossly intact to confrontation, speech clear  Skin: No rashes     Laboratory data:    I have reviewed the labs done in the emergency room.    Results from last 7 days   Lab Units 05/03/25  1406   SODIUM mmol/L 143   POTASSIUM mmol/L 4.3   CHLORIDE mmol/L 96*   CO2 mmol/L 45.8*   BUN mg/dL 17   CREATININE mg/dL 0.57   CALCIUM mg/dL 9.3   BILIRUBIN mg/dL 0.2   ALK PHOS U/L 96   ALT (SGPT) U/L 19   AST (SGOT) U/L 29   GLUCOSE mg/dL 127*     Results from last 7 days   Lab Units 05/03/25  1406   WBC 10*3/mm3 4.95   HEMOGLOBIN g/dL 10.0*   HEMATOCRIT % 35.7   PLATELETS 10*3/mm3 167         Results from last 7 days   Lab Units 05/03/25  1516 05/03/25  1406   HSTROP T ng/L 13 13     Results from last 7 days   Lab Units 05/03/25  1406   PROBNP pg/mL 2,052.0*             Results from last 7 days   Lab Units 05/03/25  1613   PH, ARTERIAL pH units 7.350   PO2 ART mm Hg 74.2*   PCO2, ARTERIAL mm Hg 85.4*   HCO3 ART mmol/L 47.1*     Radiology:    XR Chest 1 View  Result Date: 5/3/2025  PROCEDURE: XR CHEST 1 VW-  INDICATION:  SOA Triage Protocol  FINDINGS:  A portable view of the chest was obtained.  Comparison is made to a prior exam dated 4/9/2025.   There is stable mild cardiomegaly. There is been slight worsening of bilateral interstitial opacities. This may represent pulmonary edema. No pleural effusion or pneumothorax.      Worsening interstitial opacities. Favor pulmonary edema.   This report was signed and finalized on 5/3/2025 2:36 PM by Loan Carbone MD.        Assessment:    Acute on chronic hypoxic hypercapnic respiratory failure  Acute exacerbation of COPD  Acute exacerbation of systolic congestive heart failure  Atrial fibrillation  Hypertension  Hyperlipidemia  Tobacco use disorder  Osteoporosis  Osteoarthritis  Fibromyalgia  Anxiety  Depression  Vitamin D  deficiency    Plan:    Acute on chronic hypoxic hypercapnic respiratory failure  Acute exacerbation of COPD  Acute exacerbation of systolic congestive heart failure  Supportive oxygen.  BiPAP if necessary.  IV Zosyn due to failure of outpatient antibiotic therapies  DuoNebs, Solu-Medrol, Mucinex  Brovana, Pulmicort, Yupelri  Flutter valve.  Atrial fibrillation  Resume Eliquis, amiodarone, metoprolol  Hypertension  Hyperlipidemia  Tobacco use disorder  Osteoporosis  Osteoarthritis  Fibromyalgia  Anxiety  Depression  Vitamin D deficiency  Resume home medications as appropriate    Risk Assessment: High  DVT Prophylaxis: Eliquis  Code Status: Full code  Diet: Consistent carb, cardiac    Ramses Chaudhari DO  05/03/25  16:42 EDT    Dictated utilizing Dragon dictation.

## 2025-05-04 ENCOUNTER — APPOINTMENT (OUTPATIENT)
Dept: CARDIOLOGY | Facility: HOSPITAL | Age: 80
End: 2025-05-04
Payer: MEDICARE

## 2025-05-04 LAB
ANION GAP SERPL CALCULATED.3IONS-SCNC: 4.3 MMOL/L (ref 5–15)
AORTIC DIMENSIONLESS INDEX: 0.69 (DI)
AV MEAN PRESS GRAD SYS DOP V1V2: 8 MMHG
AV VMAX SYS DOP: 201 CM/SEC
BASOPHILS # BLD AUTO: 0 10*3/MM3 (ref 0–0.2)
BASOPHILS NFR BLD AUTO: 0 % (ref 0–1.5)
BH CV ECHO MEAS - AO MAX PG: 16.2 MMHG
BH CV ECHO MEAS - AO ROOT DIAM: 2.47 CM
BH CV ECHO MEAS - AO V2 VTI: 27.7 CM
BH CV ECHO MEAS - AVA(I,D): 1.41 CM2
BH CV ECHO MEAS - EDV(CUBED): 72 ML
BH CV ECHO MEAS - EDV(MOD-SP2): 31.7 ML
BH CV ECHO MEAS - EDV(MOD-SP4): 57 ML
BH CV ECHO MEAS - EF(MOD-SP2): 50.5 %
BH CV ECHO MEAS - EF(MOD-SP4): 61.4 %
BH CV ECHO MEAS - ESV(CUBED): 12.2 ML
BH CV ECHO MEAS - ESV(MOD-SP2): 15.7 ML
BH CV ECHO MEAS - ESV(MOD-SP4): 22 ML
BH CV ECHO MEAS - FS: 44.7 %
BH CV ECHO MEAS - IVS/LVPW: 1.5 CM
BH CV ECHO MEAS - IVSD: 1.35 CM
BH CV ECHO MEAS - LA DIMENSION: 3.3 CM
BH CV ECHO MEAS - LAT PEAK E' VEL: 10.8 CM/SEC
BH CV ECHO MEAS - LV DIASTOLIC VOL/BSA (35-75): 34.5 CM2
BH CV ECHO MEAS - LV MASS(C)D: 159.8 GRAMS
BH CV ECHO MEAS - LV MAX PG: 7.5 MMHG
BH CV ECHO MEAS - LV MEAN PG: 4 MMHG
BH CV ECHO MEAS - LV SYSTOLIC VOL/BSA (12-30): 13.3 CM2
BH CV ECHO MEAS - LV V1 MAX: 137 CM/SEC
BH CV ECHO MEAS - LV V1 VTI: 19 CM
BH CV ECHO MEAS - LVIDD: 4.2 CM
BH CV ECHO MEAS - LVIDS: 2.3 CM
BH CV ECHO MEAS - LVOT AREA: 2.06 CM2
BH CV ECHO MEAS - LVOT DIAM: 1.62 CM
BH CV ECHO MEAS - LVPWD: 0.9 CM
BH CV ECHO MEAS - MED PEAK E' VEL: 8.1 CM/SEC
BH CV ECHO MEAS - MV DEC SLOPE: 758 CM/SEC2
BH CV ECHO MEAS - MV DEC TIME: 0.15 SEC
BH CV ECHO MEAS - MV E MAX VEL: 113 CM/SEC
BH CV ECHO MEAS - MV MAX PG: 6.5 MMHG
BH CV ECHO MEAS - MV MEAN PG: 3 MMHG
BH CV ECHO MEAS - MV V2 VTI: 12.4 CM
BH CV ECHO MEAS - MVA(VTI): 3.2 CM2
BH CV ECHO MEAS - PA ACC TIME: 0.05 SEC
BH CV ECHO MEAS - PA V2 MAX: 119 CM/SEC
BH CV ECHO MEAS - RAP SYSTOLE: 3 MMHG
BH CV ECHO MEAS - RV MAX PG: 2.4 MMHG
BH CV ECHO MEAS - RV V1 MAX: 77.4 CM/SEC
BH CV ECHO MEAS - RV V1 VTI: 10.2 CM
BH CV ECHO MEAS - RVSP: 23.8 MMHG
BH CV ECHO MEAS - SV(LVOT): 39.2 ML
BH CV ECHO MEAS - SV(MOD-SP2): 16 ML
BH CV ECHO MEAS - SV(MOD-SP4): 35 ML
BH CV ECHO MEAS - SVI(LVOT): 23.7 ML/M2
BH CV ECHO MEAS - SVI(MOD-SP2): 9.7 ML/M2
BH CV ECHO MEAS - SVI(MOD-SP4): 21.2 ML/M2
BH CV ECHO MEAS - TAPSE (>1.6): 1.09 CM
BH CV ECHO MEAS - TR MAX PG: 20.8 MMHG
BH CV ECHO MEAS - TR MAX VEL: 228 CM/SEC
BH CV ECHO MEASUREMENTS AVERAGE E/E' RATIO: 11.96
BH CV ECHO SHUNT ASSESSMENT PERFORMED (HIDDEN SCRIPTING): 1
BH CV XLRA - RV BASE: 3.6 CM
BH CV XLRA - RV MID: 2.46 CM
BH CV XLRA - TDI S': 9.3 CM/SEC
BUN SERPL-MCNC: 23 MG/DL (ref 8–23)
BUN/CREAT SERPL: 35.4 (ref 7–25)
CALCIUM SPEC-SCNC: 9.1 MG/DL (ref 8.6–10.5)
CHLORIDE SERPL-SCNC: 95 MMOL/L (ref 98–107)
CO2 SERPL-SCNC: 43.7 MMOL/L (ref 22–29)
CREAT SERPL-MCNC: 0.65 MG/DL (ref 0.57–1)
DEPRECATED RDW RBC AUTO: 49.3 FL (ref 37–54)
EGFRCR SERPLBLD CKD-EPI 2021: 90.2 ML/MIN/1.73
EOSINOPHIL # BLD AUTO: 0 10*3/MM3 (ref 0–0.4)
EOSINOPHIL NFR BLD AUTO: 0 % (ref 0.3–6.2)
ERYTHROCYTE [DISTWIDTH] IN BLOOD BY AUTOMATED COUNT: 14.8 % (ref 12.3–15.4)
GLUCOSE SERPL-MCNC: 135 MG/DL (ref 65–99)
HCT VFR BLD AUTO: 32.8 % (ref 34–46.6)
HGB BLD-MCNC: 9.5 G/DL (ref 12–15.9)
IMM GRANULOCYTES # BLD AUTO: 0.03 10*3/MM3 (ref 0–0.05)
IMM GRANULOCYTES NFR BLD AUTO: 0.6 % (ref 0–0.5)
LEFT ATRIUM VOLUME INDEX: 35.9 ML/M2
LV EF BIPLANE MOD: 57.3 %
LYMPHOCYTES # BLD AUTO: 0.43 10*3/MM3 (ref 0.7–3.1)
LYMPHOCYTES NFR BLD AUTO: 8 % (ref 19.6–45.3)
MCH RBC QN AUTO: 26.2 PG (ref 26.6–33)
MCHC RBC AUTO-ENTMCNC: 29 G/DL (ref 31.5–35.7)
MCV RBC AUTO: 90.6 FL (ref 79–97)
MONOCYTES # BLD AUTO: 0.13 10*3/MM3 (ref 0.1–0.9)
MONOCYTES NFR BLD AUTO: 2.4 % (ref 5–12)
NEUTROPHILS NFR BLD AUTO: 4.78 10*3/MM3 (ref 1.7–7)
NEUTROPHILS NFR BLD AUTO: 89 % (ref 42.7–76)
NRBC BLD AUTO-RTO: 0 /100 WBC (ref 0–0.2)
PLATELET # BLD AUTO: 160 10*3/MM3 (ref 140–450)
PMV BLD AUTO: 11.1 FL (ref 6–12)
POTASSIUM SERPL-SCNC: 4.3 MMOL/L (ref 3.5–5.2)
RBC # BLD AUTO: 3.62 10*6/MM3 (ref 3.77–5.28)
SODIUM SERPL-SCNC: 143 MMOL/L (ref 136–145)
WBC NRBC COR # BLD AUTO: 5.37 10*3/MM3 (ref 3.4–10.8)

## 2025-05-04 PROCEDURE — 25010000002 FUROSEMIDE PER 20 MG: Performed by: FAMILY MEDICINE

## 2025-05-04 PROCEDURE — 25010000002 ONDANSETRON PER 1 MG: Performed by: FAMILY MEDICINE

## 2025-05-04 PROCEDURE — 99233 SBSQ HOSP IP/OBS HIGH 50: CPT | Performed by: INTERNAL MEDICINE

## 2025-05-04 PROCEDURE — 93306 TTE W/DOPPLER COMPLETE: CPT

## 2025-05-04 PROCEDURE — 93005 ELECTROCARDIOGRAM TRACING: CPT | Performed by: INTERNAL MEDICINE

## 2025-05-04 PROCEDURE — 97162 PT EVAL MOD COMPLEX 30 MIN: CPT

## 2025-05-04 PROCEDURE — 80048 BASIC METABOLIC PNL TOTAL CA: CPT | Performed by: FAMILY MEDICINE

## 2025-05-04 PROCEDURE — G0378 HOSPITAL OBSERVATION PER HR: HCPCS

## 2025-05-04 PROCEDURE — 85025 COMPLETE CBC W/AUTO DIFF WBC: CPT | Performed by: FAMILY MEDICINE

## 2025-05-04 PROCEDURE — 93306 TTE W/DOPPLER COMPLETE: CPT | Performed by: INTERNAL MEDICINE

## 2025-05-04 PROCEDURE — 25010000002 PIPERACILLIN SOD-TAZOBACTAM PER 1 G: Performed by: FAMILY MEDICINE

## 2025-05-04 PROCEDURE — 94799 UNLISTED PULMONARY SVC/PX: CPT

## 2025-05-04 PROCEDURE — 25010000002 METHYLPREDNISOLONE PER 125 MG: Performed by: FAMILY MEDICINE

## 2025-05-04 PROCEDURE — 63710000001 REVEFENACIN 175 MCG/3ML SOLUTION: Performed by: FAMILY MEDICINE

## 2025-05-04 PROCEDURE — 93010 ELECTROCARDIOGRAM REPORT: CPT | Performed by: INTERNAL MEDICINE

## 2025-05-04 PROCEDURE — 94664 DEMO&/EVAL PT USE INHALER: CPT

## 2025-05-04 PROCEDURE — 94761 N-INVAS EAR/PLS OXIMETRY MLT: CPT

## 2025-05-04 RX ORDER — DILTIAZEM HCL 60 MG
30 TABLET ORAL EVERY 6 HOURS SCHEDULED
Status: DISCONTINUED | OUTPATIENT
Start: 2025-05-04 | End: 2025-05-06 | Stop reason: HOSPADM

## 2025-05-04 RX ORDER — OXYCODONE AND ACETAMINOPHEN 7.5; 325 MG/1; MG/1
1 TABLET ORAL EVERY 6 HOURS PRN
Refills: 0 | Status: DISCONTINUED | OUTPATIENT
Start: 2025-05-04 | End: 2025-05-06 | Stop reason: HOSPADM

## 2025-05-04 RX ADMIN — METHYLPREDNISOLONE SODIUM SUCCINATE 62.5 MG: 125 INJECTION INTRAMUSCULAR; INTRAVENOUS at 09:24

## 2025-05-04 RX ADMIN — GUAIFENESIN 600 MG: 600 TABLET, EXTENDED RELEASE ORAL at 09:24

## 2025-05-04 RX ADMIN — DULOXETINE HYDROCHLORIDE 60 MG: 30 CAPSULE, DELAYED RELEASE ORAL at 09:24

## 2025-05-04 RX ADMIN — TRAZODONE HYDROCHLORIDE 100 MG: 50 TABLET ORAL at 20:31

## 2025-05-04 RX ADMIN — Medication 10 ML: at 09:25

## 2025-05-04 RX ADMIN — Medication 10 ML: at 20:32

## 2025-05-04 RX ADMIN — PIPERACILLIN AND TAZOBACTAM 3.38 G: 3; .375 INJECTION, POWDER, FOR SOLUTION INTRAVENOUS at 09:21

## 2025-05-04 RX ADMIN — FUROSEMIDE 60 MG: 10 INJECTION, SOLUTION INTRAMUSCULAR; INTRAVENOUS at 09:24

## 2025-05-04 RX ADMIN — METOPROLOL SUCCINATE 25 MG: 25 TABLET, EXTENDED RELEASE ORAL at 09:25

## 2025-05-04 RX ADMIN — APIXABAN 5 MG: 5 TABLET, FILM COATED ORAL at 09:24

## 2025-05-04 RX ADMIN — ONDANSETRON 4 MG: 2 INJECTION INTRAMUSCULAR; INTRAVENOUS at 12:16

## 2025-05-04 RX ADMIN — OXYCODONE HYDROCHLORIDE AND ACETAMINOPHEN 1 TABLET: 7.5; 325 TABLET ORAL at 07:24

## 2025-05-04 RX ADMIN — APIXABAN 5 MG: 5 TABLET, FILM COATED ORAL at 20:31

## 2025-05-04 RX ADMIN — FUROSEMIDE 60 MG: 10 INJECTION, SOLUTION INTRAMUSCULAR; INTRAVENOUS at 17:29

## 2025-05-04 RX ADMIN — BUDESONIDE INHALATION 0.5 MG: 0.5 SUSPENSION RESPIRATORY (INHALATION) at 18:51

## 2025-05-04 RX ADMIN — GUAIFENESIN 600 MG: 600 TABLET, EXTENDED RELEASE ORAL at 20:31

## 2025-05-04 RX ADMIN — DILTIAZEM HYDROCHLORIDE 30 MG: 60 TABLET, FILM COATED ORAL at 12:47

## 2025-05-04 RX ADMIN — AMIODARONE HYDROCHLORIDE 200 MG: 200 TABLET ORAL at 09:24

## 2025-05-04 RX ADMIN — BUDESONIDE INHALATION 0.5 MG: 0.5 SUSPENSION RESPIRATORY (INHALATION) at 07:05

## 2025-05-04 RX ADMIN — PIPERACILLIN AND TAZOBACTAM 3.38 G: 3; .375 INJECTION, POWDER, FOR SOLUTION INTRAVENOUS at 00:02

## 2025-05-04 RX ADMIN — REVEFENACIN 175 MCG: 175 SOLUTION RESPIRATORY (INHALATION) at 07:05

## 2025-05-04 RX ADMIN — ARFORMOTEROL TARTRATE 15 MCG: 15 SOLUTION RESPIRATORY (INHALATION) at 07:05

## 2025-05-04 RX ADMIN — VALSARTAN 40 MG: 80 TABLET, FILM COATED ORAL at 09:24

## 2025-05-04 RX ADMIN — ATORVASTATIN CALCIUM 40 MG: 40 TABLET, FILM COATED ORAL at 09:25

## 2025-05-04 RX ADMIN — DILTIAZEM HYDROCHLORIDE 30 MG: 60 TABLET, FILM COATED ORAL at 17:28

## 2025-05-04 RX ADMIN — ACETAMINOPHEN 650 MG: 325 TABLET, FILM COATED ORAL at 10:57

## 2025-05-04 RX ADMIN — PIPERACILLIN AND TAZOBACTAM 3.38 G: 3; .375 INJECTION, POWDER, FOR SOLUTION INTRAVENOUS at 17:29

## 2025-05-04 RX ADMIN — ARFORMOTEROL TARTRATE 15 MCG: 15 SOLUTION RESPIRATORY (INHALATION) at 18:50

## 2025-05-04 RX ADMIN — DULOXETINE HYDROCHLORIDE 60 MG: 30 CAPSULE, DELAYED RELEASE ORAL at 20:32

## 2025-05-04 NOTE — THERAPY EVALUATION
Patient Name: Gabi Dudley  : 1945    MRN: [id number]                              Today's Date: 2025       Admit Date: 5/3/2025    Visit Dx: No diagnosis found.  Patient Active Problem List   Diagnosis    Adrenal adenoma    Anxiety    Chronic fatigue    Fibromyalgia    Hyperlipidemia    Essential hypertension    Insomnia    Osteoarthritis of knee    Osteoporosis    Pulmonary emphysema    Simple renal cyst    Tension headache    Vitamin D deficiency    Diverticulosis    Inversion of nipple    Paroxysmal atrial fibrillation    Coronary artery disease involving native coronary artery of native heart without angina pectoris    Autonomic dysfunction    Gastroesophageal reflux disease without esophagitis    Urge incontinence    Erythrasma    Compression fracture of T5 vertebra    Lung nodule    COPD (chronic obstructive pulmonary disease)    Overweight (BMI 25.0-29.9)    Acute on chronic respiratory failure with hypoxia    Acute on chronic respiratory failure with hypoxia and hypercapnia    Iron deficiency anemia    Impaired mobility and ADLs    Acute respiratory failure with hypoxia and hypercapnia    Aspiration pneumonia of right lower lobe    Partial small bowel obstruction    Moderate malnutrition    Bowel obstruction    Enteritis    Diverticulitis    Elevated troponin    Chronic combined systolic and diastolic CHF (congestive heart failure)    NSTEMI (non-ST elevated myocardial infarction)    COPD exacerbation     Past Medical History:   Diagnosis Date    Adrenal adenoma     Anemia     Arrhythmia     Asthma     Atrial fibrillation     Back pain     Benign colonic polyp     Benign tumor of adrenal gland     Cataract     bilateral    Cholelithiasis     Chronic bronchitis     Chronic bronchitis with COPD (chronic obstructive pulmonary disease)     COPD (chronic obstructive pulmonary disease)     Coronary artery disease     Depression     Diverticulosis Years ago    Elevated cholesterol      Environmental and seasonal allergies     Fibromyalgia     Fibromyalgia, primary Sometime in the 90s    Gastritis     Generalized anxiety disorder     GERD (gastroesophageal reflux disease)     H/O mammogram     Headache Years ago    Hemorrhoids     History of blood transfusion 1985    History of blood transfusion 1985    History of echocardiogram     History of endometriosis     History of nuclear stress test     Hospitalization or health care facility admission within last 6 months     5 times between 11/2022-05/2023    Hypertension     IBS (irritable bowel syndrome)     Impaired functional mobility, balance, gait, and endurance     Impaired mobility     Inverted nipple     Kidney disease     Kidney stone     Liver cyst     Low back pain Years ago    Nodular radiologic density     Nodule of left lung     NSTEMI (non-ST elevated myocardial infarction) 9/24/2024    On home oxygen therapy     2 liters NC QHS    Osteoarthritis     Osteopenia Several years ago    Osteoporosis     PONV (postoperative nausea and vomiting)     Renal cyst     Sinus problem     2014    Sinusitis     Skin cancer     basal cell carcinoma    SOB (shortness of breath)     Tobacco use     Urinary frequency     Urinary tract infection Years ago    Frequent UTIs    Vitamin D deficiency     Wears glasses     Wears partial dentures     upper plate     Past Surgical History:   Procedure Laterality Date    APPENDECTOMY  1980s    CARDIAC CATHETERIZATION  2003    CATARACT EXTRACTION  2013    both eyes    CHOLECYSTECTOMY  2020    CHOLECYSTECTOMY WITH INTRAOPERATIVE CHOLANGIOGRAM N/A 10/30/2020    Procedure: CHOLECYSTECTOMY LAPAROSCOPIC INTRAOPERATIVE CHOLANGIOGRAPHY;  Surgeon: Sima Moses MD;  Location: [facility];  Service: General;  Laterality: N/A;    COLON SURGERY  2020    COLONOSCOPY  03/11/2013    COLONOSCOPY  06/21/2016    COLONOSCOPY N/A 07/24/2019    Procedure: COLONOSCOPY W/ COLD FORCEP POLYPECTOMIES; HOT SNARE POLYPECTOMIES; COLD SNARE  POLYPECTOMY;  Surgeon: Goyo Nunez MD;  Location: [facility] ENDOSCOPY;  Service: Gastroenterology    COLONOSCOPY W/ BIOPSIES AND POLYPECTOMY      EXPLORATORY LAPAROTOMY N/A 11/23/2020    Procedure: colectomy, right, closure of enterotomy x 2, reduction of internal volvulus;  Surgeon: Sima Moses MD;  Location: [facility] OR;  Service: General;  Laterality: N/A;    EXPLORATORY LAPAROTOMY N/A 8/9/2023    Procedure: LAPAROTOMY EXPLORATORY WITH LYIS OF ADHESIONS AND  CENTRAL LINE INSERTION;  Surgeon: Bianca Isaac DO;  Location: [facility] OR;  Service: General;  Laterality: N/A;    HYSTERECTOMY  1980s    partial    LYSIS OF ABDOMINAL ADHESIONS      TONSILLECTOMY  1987    UPPER GASTROINTESTINAL ENDOSCOPY  01/13/2015    VAGINAL DELIVERY      x2      General Information       Row Name 05/04/25 1047          Physical Therapy Time and Intention    Document Type evaluation  -TW     Mode of Treatment physical therapy;individual therapy  -TW       Row Name 05/04/25 1047          General Information    Patient Profile Reviewed yes  -TW     Prior Level of Function independent:;all household mobility;min assist:;ADL's  pt is usually able to walk with her rollator without assist short distances. Has BSC, shower seat, and home O2 at 4 L.  -TW     Existing Precautions/Restrictions fall;cardiac;oxygen therapy device and L/min  -TW     Barriers to Rehab medically complex;previous functional deficit;cognitive status  -TW       Row Name 05/04/25 1047          Living Environment    Current Living Arrangements home  -TW     People in Home child(ashely), adult  -TW       Row Name 05/04/25 1047          Home Main Entrance    Number of Stairs, Main Entrance none  -TW       Row Name 05/04/25 1047          Stairs Within Home, Primary    Number of Stairs, Within Home, Primary none  -TW       Row Name 05/04/25 1047          Cognition    Orientation Status (Cognition) oriented to;person;place;other (see comments)  Pt does have increased anxiety  when asked question after question. Pt reassured that it was just to get to know her better.  -       Row Name 05/04/25 1047          Safety Issues/Impairments Affecting Functional Mobility    Safety Issues Affecting Function (Mobility) impulsivity;at risk behavior observed;safety precaution awareness;safety precautions follow-through/compliance;insight into deficits/self-awareness;sequencing abilities;ability to follow commands;awareness of need for assistance;judgment;problem-solving  -TW     Impairments Affecting Function (Mobility) balance;cognition;endurance/activity tolerance;shortness of breath;strength  -TW     Cognitive Impairments, Mobility Safety/Performance awareness, need for assistance;impulsivity;insight into deficits/self-awareness;judgment;problem-solving/reasoning;safety precaution awareness;safety precaution follow-through;sequencing abilities  -TW               User Key  (r) = Recorded By, (t) = Taken By, (c) = Cosigned By      Initials Name Provider Type    TW Alice Laura, HANS Physical Therapist                   Mobility       Row Name 05/04/25 1047          Bed Mobility    Bed Mobility supine-sit;sit-supine  -TW     Supine-Sit [place] (Bed Mobility) verbal cues;standby assist  -TW     Sit-Supine [place] (Bed Mobility) contact guard;verbal cues  -TW     Assistive Device (Bed Mobility) head of bed elevated;bed rails  -TW     Comment, (Bed Mobility) Pt is impulsive. As soon as she stated she needed to get up to [facility] she began throwing off covers and trying to get up on EOB. Pt was able to come to sit on EOB with use of rail and HOB raised with cues for safety. Pt was able to sit in midline without assist.  -       Row Name 05/04/25 1043          Transfers    Comment, (Transfers) At home pt pivots to her BS without an assistive device. During evaluation pt found to have sequencing difficulty and increased anxiety with mobility. At one point pt had pivoted to the BS but was not able  to understand that in order to sit safely onto the [facility] she needed to let go of the bed rail (which was infront of her). Trouble relaxing hips to sit.  -TW       Row Name 05/04/25 1047          Bed-Chair Transfer    Bed-Chair [place] (Transfers) verbal cues;nonverbal cues (demo/gesture);contact guard  -TW     Assistive Device (Bed-Chair Transfers) other (see comments)  gait belt  -TW     Comment, (Bed-Chair Transfer) pt was short of breath post 2 transfers with O2 sat at 93% post second transfer on 4 L.  -TW       Row Name 05/04/25 1047          Sit-Stand Transfer    Sit-Stand [place] (Transfers) contact guard;nonverbal cues (demo/gesture);verbal cues  -TW     Assistive Device (Sit-Stand Transfers) other (see comments)  gt belt  -TW       Row Name 05/04/25 1047          Gait/Stairs (Locomotion)    [place] Level (Gait) not tested  -TW     Patient was able to Ambulate no, other medical factors prevent ambulation  -TW     Reason Patient was unable to Ambulate Hypoxia/Respiratory Distress;Other (Comment)  pt states she is too tired and wishes to return to bed.  -TW               User Key  (r) = Recorded By, (t) = Taken By, (c) = Cosigned By      Initials Name Provider Type    TW Alice Laura, PT Physical Therapist                   Obj/Interventions       Row Name 05/04/25 1047          Range of Motion Comprehensive    Comment, General Range of Motion BLE groslsy WFLs  -       Row Name 05/04/25 1047          Strength Comprehensive (MMT)    Comment, General Manual Muscle Testing (MMT) Assessment BLE grossly 3 to 3+/5 with functional movement.  -TW       Row Name 05/04/25 1047          Balance    Balance Assessment sitting static balance;standing static balance;sitting dynamic balance;standing dynamic balance  -TW     Static Sitting Balance standby assist  -TW     Dynamic Sitting Balance standby assist  -TW     Position, Sitting Balance unsupported;sitting edge of bed  -TW     Static Standing Balance  verbal cues;non-verbal cues (demo/gesture);contact guard  -TW     Dynamic Standing Balance verbal cues;non-verbal cues (demo/gesture);contact guard  -TW     Position/Device Used, Standing Balance unsupported;supported  -TW               User Key  (r) = Recorded By, (t) = Taken By, (c) = Cosigned By      Initials Name Provider Type    TW Keya, Alice, PT Physical Therapist                   Goals/Plan       Row Name 05/04/25 1047          Bed Mobility Goal 1 (PT)    Activity/Assistive Device (Bed Mobility Goal 1, PT) bed mobility activities, all  -TW     [place] Level/Cues Needed (Bed Mobility Goal 1, PT) standby assist;verbal cues required  -TW     Time Frame (Bed Mobility Goal 1, PT) 5 days  -TW     Progress/Outcomes (Bed Mobility Goal 1, PT) goal ongoing  -TW       Row Name 05/04/25 1047          Transfer Goal 1 (PT)    Activity/Assistive Device (Transfer Goal 1, PT) sit-to-stand/stand-to-sit;bed-to-chair/chair-to-bed;toilet;walker, rolling  -TW     [place] Level/Cues Needed (Transfer Goal 1, PT) standby assist;verbal cues required  -TW     Time Frame (Transfer Goal 1, PT) long term goal (LTG);10 days  -TW     Progress/Outcome (Transfer Goal 1, PT) goal ongoing  -TW       Row Name 05/04/25 1047          Gait Training Goal 1 (PT)    Activity/Assistive Device (Gait Training Goal 1, PT) gait (walking locomotion);assistive device use;increase endurance/gait distance;decrease fall risk  -TW     [place] Level (Gait Training Goal 1, PT) standby assist  -TW     Distance (Gait Training Goal 1, PT) 100 ft  -TW     Time Frame (Gait Training Goal 1, PT) long term goal (LTG);10 days  -TW     Strategies/Barriers (Gait Training Goal 1, PT) with O2 sat above 90%  -TW     Progress/Outcome (Gait Training Goal 1, PT) goal ongoing  -TW       Row Name 05/04/25 1047          Patient Education Goal (PT)    Activity (Patient Education Goal, PT) BLE x10  -TW     [place]/Cues/Accuracy (Memory Goal 2, PT) demonstrates  adequately  -TW     Time Frame (Patient Education Goal, PT) long term goal (LTG);10 days  -TW     Progress/Outcome (Patient Education Goal, PT) goal ongoing  -TW       Row Name 05/04/25 1047          Therapy Assessment/Plan (PT)    Planned Therapy Interventions (PT) balance training;bed mobility training;gait training;transfer training;strengthening;patient/family education  -TW               User Key  (r) = Recorded By, (t) = Taken By, (c) = Cosigned By      Initials Name Provider Type    TW Alice Laura, HANS Physical Therapist                   Clinical Impression       Row Name 05/04/25 1047          Pain    Pretreatment Pain Rating 0/10 - no pain  -TW     Posttreatment Pain Rating 0/10 - no pain  -TW     Pre/Posttreatment Pain Comment Only c/o being short of breath and tired.  -TW       Row Name 05/04/25 1047          Plan of Care Review    Plan of Care Reviewed With patient;child  -TW     Outcome Evaluation PT evaluation completed this date with pt presenting supine in bed, no c/o pain, and O x 2 and with cues x 3. Pt was on 4 L NC with O2 sat at 96% and  b/min. Pt expressed that she was too tired to work with PT because she did not sleep well but then pt needed to use BSC so therapist was able to evaluate pt's mobility. Pt was very impulsive and as soon as she said she needed to urinated began getting OOB. Therapsit provided pt with safety cues due to multiple medical lines. Pt was able to come to sit on EOB with HOB raised using L rail. Pt was able to sit on EOB with SBA and cues. During stand pivot to BSC, gt belt in use and pt able to come to stand with CGA and begin her pivot to BSC but had trouble understanding that in order to sit she needed to let go of bed rail. Pt expressed fear of falling as therapist assisted pt in reaching back to arm rest to sit. For pivot back to bed pt was able to process the transfers with less cues. Pt was short of breath post second transfer with O2 sat at 93% and pt  kept asking to lay back down. Pt was able to get BLE's up into bed by herself. Pt presents with deficits in endurance, balance, and strength. She is expected to improve her functional mobility with continued PT services prior to d/c.  -TW       Row Name 05/04/25 1047          Therapy Assessment/Plan (PT)    Patient/Family Therapy Goals Statement (PT) to return home with her daughter.  -TW     Rehab Potential (PT) good  -TW     Criteria for Skilled Interventions Met (PT) yes;meets criteria  -TW     Therapy Frequency (PT) 5 times/wk  -TW     Predicted Duration of Therapy Intervention (PT) 10 days  -TW       Row Name 05/04/25 1047          Vital Signs    Pretreatment Heart Rate (beats/min) 118  -TW     Posttreatment Heart Rate (beats/min) 122  -TW     Pre SpO2 (%) 96  -TW     O2 Delivery Pre Treatment supplemental O2  4 L  -TW     Intra SpO2 (%) 93  -TW     O2 Delivery Intra Treatment supplemental O2  4 L  -TW     Post SpO2 (%) 95  -TW     O2 Delivery Post Treatment supplemental O2  4 L  -TW     Pre Patient Position Supine  -TW     Intra Patient Position Standing  -TW     Post Patient Position Supine  -TW       Row Name 05/04/25 1047          Positioning and Restraints    Pre-Treatment Position in bed  -TW     Post Treatment Position bed  -TW     In Bed notified nsg;supine;call light within reach;encouraged to call for assist;side rails up x3;with family/caregiver  -TW               User Key  (r) = Recorded By, (t) = Taken By, (c) = Cosigned By      Initials Name Provider Type    TW Alice Laura, PT Physical Therapist                   Outcome Measures       Row Name 05/04/25 1047 05/04/25 0800       How much help from another person do you currently need...    Turning from your back to your side while in flat bed without using bedrails? 3  -TW 3  -SC    Moving from lying on back to sitting on the side of a flat bed without bedrails? 3  -TW 3  -SC    Moving to and from a bed to a chair (including a wheelchair)? 3  -TW  3  -SC    Standing up from a chair using your arms (e.g., wheelchair, bedside chair)? 3  -TW 3  -SC    Climbing 3-5 steps with a railing? 3  -TW 3  -SC    To walk in hospital room? 3  -TW 3  -SC    AM-PAC 6 Clicks Score (PT) 18  -TW 18  -SC    Highest Level of Mobility Goal 6 --> Walk 10 steps or more  -TW 6 --> Walk 10 steps or more  -SC      Row Name 05/04/25 1047          Functional Assessment    Outcome Measure Options AM-PAC 6 Clicks Basic Mobility (PT)  -               User Key  (r) = Recorded By, (t) = Taken By, (c) = Cosigned By      Initials Name Provider Type    TW Alice Laura, PT Physical Therapist    Lisette Stewart, RN Registered Nurse                                 Physical Therapy Education       Title: PT OT SLP Therapies (In Progress)       Topic: Physical Therapy (In Progress)       Point: Mobility training (Done)       Learning Progress Summary            Patient Acceptance, E, VU,NR by TW at 5/4/2025 1047    Comment: Pt and her daughter educated on purpose of PT evaluation and inpt POC as well as pt being educated on waiting for assist for mobility. Pt will need reinforcement of fall precautions.   Family Acceptance, E, VU,NR by TW at 5/4/2025 1047    Comment: Pt and her daughter educated on purpose of PT evaluation and inpt POC as well as pt being educated on waiting for assist for mobility. Pt will need reinforcement of fall precautions.                      Point: Home exercise program (Not Started)       Learner Progress:  Not documented in this visit.              Point: Body mechanics (Not Started)       Learner Progress:  Not documented in this visit.              Point: Precautions (Done)       Learning Progress Summary            Patient Acceptance, E, VU,NR by TW at 5/4/2025 1047    Comment: Pt and her daughter educated on purpose of PT evaluation and inpt POC as well as pt being educated on waiting for assist for mobility. Pt will need reinforcement of fall precautions.    Family Acceptance, E, KOFI,NR by TW at 5/4/2025 3207    Comment: Pt and her daughter educated on purpose of PT evaluation and inpt POC as well as pt being educated on waiting for assist for mobility. Pt will need reinforcement of fall precautions.                                      User Key       Initials Effective Dates Name Provider Type Discipline     06/16/21 -  Alice Laura PT Physical Therapist PT                  PT Recommendation and Plan  Planned Therapy Interventions (PT): balance training, bed mobility training, gait training, transfer training, strengthening, patient/family education  Outcome Evaluation: PT evaluation completed this date with pt presenting supine in bed, no c/o pain, and O x 2 and with cues x 3. Pt was on 4 L NC with O2 sat at 96% and  b/min. Pt expressed that she was too tired to work with PT because she did not sleep well but then pt needed to use BSC so therapist was able to evaluate pt's mobility. Pt was very impulsive and as soon as she said she needed to urinated began getting OOB. Therapsit provided pt with safety cues due to multiple medical lines. Pt was able to come to sit on EOB with HOB raised using L rail. Pt was able to sit on EOB with SBA and cues. During stand pivot to BSC, gt belt in use and pt able to come to stand with CGA and begin her pivot to BSC but had trouble understanding that in order to sit she needed to let go of bed rail. Pt expressed fear of falling as therapist assisted pt in reaching back to arm rest to sit. For pivot back to bed pt was able to process the transfers with less cues. Pt was short of breath post second transfer with O2 sat at 93% and pt kept asking to lay back down. Pt was able to get BLE's up into bed by herself. Pt presents with deficits in endurance, balance, and strength. She is expected to improve her functional mobility with continued PT services prior to d/c.     Time Calculation:   PT Evaluation Complexity  History, PT  Evaluation Complexity: 3 or more personal factors and/or comorbidities  Examination of Body Systems (PT Eval Complexity): total of 3 or more elements  Clinical Presentation (PT Evaluation Complexity): evolving  Clinical Decision Making (PT Evaluation Complexity): moderate complexity  Overall Complexity (PT Evaluation Complexity): moderate complexity     PT Charges       Row Name 05/04/25 1047             Time Calculation    Stop Time 1047  -TW      PT Received On 05/04/25 -TW      PT Goal Re-Cert Due Date 05/14/25 -TW                User Key  (r) = Recorded By, (t) = Taken By, (c) = Cosigned By      Initials Name Provider Type    TW Alice Laura, PT Physical Therapist                  Therapy Charges for Today       Code Description Service Date Service Provider Modifiers Qty    30881510329 HC PT EVAL MOD COMPLEXITY 3 5/4/2025 Alice Laura PT GP 1            PT G-Codes  Outcome Measure Options: AM-PAC 6 Clicks Basic Mobility (PT)  AM-PAC 6 Clicks Score (PT): 18  PT Discharge Summary  Anticipated Discharge Disposition (PT): home with assist, home with home health    Alice Laura PT  5/4/2025

## 2025-05-04 NOTE — PLAN OF CARE
Goal Outcome Evaluation:      Pt transferred from second floor.

## 2025-05-04 NOTE — PROGRESS NOTES
"      [facility]  INTERNAL MEDICINE PROGRESS NOTE    Name:  Gabi Dudley   Age:  80 y.o.  Sex:  female  :  1945  MRN:  [id number]   Visit Number:  [id number]  Admission Date:  5/3/2025  Date Of Service:  25  Primary Care Physician:  Krystina Saunders DO     LOS: 0 days :  Patient Care Team:  Krystina Saunders DO as PCP - General (Family Medicine)  Sima Moses MD as Consulting Physician (General Surgery)  Taiwo Curry MD as Consulting Physician (Cardiology)  Soledad Stauffer, EFRAÍN as Ambulatory  ([facility]):      Subjective / Interval History:   80-year-old female brought to the emergency department for evaluation of dyspnea.  Patient has a known history of chronic respiratory failure, is on baseline 4 L nasal cannula.  Patient reports that she has been short of breath for approximately 2 weeks duration.  She was recently seen by a provider, who has started her on antibiotics.  She states that she has completed 1 round of azithromycin, started on Augmentin yesterday.  She denies any improvement despite these oral antibiotics.  She states she \"cannot catch my breath\".  No alleviating factors, exacerbated with exertion or ambulation.  Denies any recent changes in medications, sick contacts, travel history.  Workup in the ED showed CHF and COPD exacerbations.  Hospitalist services consulted for admission.     After admission overnight patient has been on oxygen with diuretics as well as IV steroids.  She has had gone into spontaneous atrial flutter this morning.  Her heart rate went up to the 130s and EKG done shows at that time it was atrial flutter with block.  She says that she did not have any chest pain.        Vital Signs:    Temp:  [97.5 °F (36.4 °C)-98.2 °F (36.8 °C)] 98.1 °F (36.7 °C)  Heart Rate:  [] 78  Resp:  [16-24] 16  BP: ()/(42-82) 99/61    Intake and output:    No intake/output data recorded.  I/O this shift:  In: 210 " [P.O.:210]  Out: 430 [Urine:430]    Physical Examination:    General Appearance:    Alert and cooperative, not in any acute distress.   Head:    Atraumatic and normocephalic, without obvious abnormality.   Eyes:            PERRLA,  No pallor. Extraocular movements are within normal limits.   Neck:   Supple,  No lymph glands, no bruit.   Lungs:     Chest shape is normal. Breath sounds heard bilaterally equally.  Fine crepitations on both side but no wheezing    Heart:    Normal S1 and S2, no murmur, no JVD.  Irregular heart rate   Abdomen:     Normal bowel sounds, no masses, no organomegaly. Soft     nontender, no guarding, no rebound tenderness.   Extremities:   Moves all extremities well, no edema, no cyanosis,    Skin:   No  bruising or rash.   Neurologic:   Grossly nonfocal and moves all extremities.      Laboratory results:  Results from last 7 days   Lab Units 05/04/25  0625 05/03/25  1406   SODIUM mmol/L 143 143   POTASSIUM mmol/L 4.3 4.3   CHLORIDE mmol/L 95* 96*   CO2 mmol/L 43.7* 45.8*   BUN mg/dL 23 17   CREATININE mg/dL 0.65 0.57   CALCIUM mg/dL 9.1 9.3   BILIRUBIN mg/dL  --  0.2   ALK PHOS U/L  --  96   ALT (SGPT) U/L  --  19   AST (SGOT) U/L  --  29   GLUCOSE mg/dL 135* 127*     Results from last 7 days   Lab Units 05/04/25  0625 05/03/25  1406   WBC 10*3/mm3 5.37 4.95   HEMOGLOBIN g/dL 9.5* 10.0*   HEMATOCRIT % 32.8* 35.7   PLATELETS 10*3/mm3 160 167         Results from last 7 days   Lab Units 05/03/25  1516 05/03/25  1406   HSTROP T ng/L 13 13           Radiology results:    Imaging Results (Last 24 Hours)       Procedure Component Value Units Date/Time    XR Chest 1 View [[id number]] Collected: 05/03/25 1435     Updated: 05/03/25 1438    Narrative:      PROCEDURE: XR CHEST 1 VW-     INDICATION:  SOA Triage Protocol     FINDINGS:  A portable view of the chest was obtained.  Comparison is  made to a prior exam dated 4/9/2025.   There is stable mild  cardiomegaly. There is been slight worsening of  bilateral interstitial  opacities. This may represent pulmonary edema. No pleural effusion or  pneumothorax.       Impression:      Worsening interstitial opacities. Favor pulmonary edema.        This report was signed and finalized on 5/3/2025 2:36 PM by Loan Carbone MD.               I have reviewed the patient's radiology reports.    Medication Review:     I have reviewed the patient's active and prn medications.     Assessment:      COPD exacerbation  Acute on chronic hypoxic hypercapnic respiratory failure  Acute exacerbation of COPD  Acute exacerbation of systolic congestive heart failure  Atrial flutter with blocker with a history of A-fib  Hypertension  Hyperlipidemia  Tobacco use disorder  Osteoporosis  Osteoarthritis  Fibromyalgia      Plan:    1.  Acute hypoxic respiratory failure-this seems to be combination with COPD and volume overload.  Keep oxygen to keep saturation 90% and above    2.  Acute exacerbation of COPD-currently on IV steroids nebs and bronchodilators    3.  Exacerbation of CHF-she is on IV diuretic and has been helping.  She is a negative balance since admission and keep a watch on the renal function for prerenal azotemia    4.  Atrial flutter with block-this seems to be new she is on amiodarone and metoprolol as well as Eliquis because of her A-fib and will consult cardiology.  Diltiazem dose has been recommended and if she continues to have uncontrolled rate she may need cardioversion tomorrow    Continue rest of the medication as per orders and goals of treatment plan of care has been addressed with the patient in details    Jonathon Faust MD  05/04/25  13:48 EDT      Please note that portions of this note were completed with a voice recognition program.

## 2025-05-04 NOTE — CASE MANAGEMENT/SOCIAL WORK
Discharge Planning Assessment   Ted     Patient Name: Gabi Dudley  MRN: [id number]  Today's Date: 5/4/2025    Admit Date: 5/3/2025    Plan: DCP retrun home with Home health lives with daughter   Discharge Needs Assessment       Row Name 05/04/25 1454       Living Environment    People in Home child(ashely), adult    Current Living Arrangements home    Potentially Unsafe Housing Conditions none    In the past 12 months has the electric, gas, oil, or water company threatened to shut off services in your home? No    Primary Care Provided by child(ashely)    Provides Primary Care For no one, unable/limited ability to care for self    Family Caregiver if Needed child(ashely), adult    Quality of Family Relationships involved    Able to Return to Prior Arrangements yes       Resource/Environmental Concerns    Resource/Environmental Concerns none    Transportation Concerns none       Transportation Needs    In the past 12 months, has lack of transportation kept you from medical appointments or from getting medications? no    In the past 12 months, has lack of transportation kept you from meetings, work, or from getting things needed for daily living? No       Food Insecurity    Within the past 12 months, you worried that your food would run out before you got the money to buy more. Never true    Within the past 12 months, the food you bought just didn't last and you didn't have money to get more. Never true       Transition Planning    Patient/Family Anticipates Transition to home with help/services    Patient/Family Anticipated Services at Transition home health care    Transportation Anticipated family or friend will provide       Discharge Needs Assessment    Readmission Within the Last 30 Days no previous admission in last 30 days    Equipment Currently Used at Home cpap;oxygen;wheelchair;Rolator    Concerns to be Addressed discharge planning    Do you want help finding or keeping work or a job? I do not need or want  help    Do you want help with school or training? For example, starting or completing job training or getting a high school diploma, GED or equivalent No                   Discharge Plan       Row Name 05/04/25 1501       Plan    Plan DCP return home with Home health lives with daughter    Plan Comments Spoke to pt regarding discharge plans .Confirmed address ,phone number and primary care provider as being correct  on face sheet .She lives with her daughter dependent for ADLS .Reports  has oxygen 4 NC unsure of DME  per records it is [facility] and per record has CPAP with [facility] She also reports  has home health unsure of provider per  records it is [facility]. at this  time plan is to return home                  Selected Continued Care - Episodes Includes continued care and service providers with selected services from the active episodes listed below             Demographic Summary       Row Name 05/04/25 9131       General Information    Admission Type observation    Referral Source admission list    Reason for Consult discharge planning    Preferred Language English                   Functional Status       Row Name 05/04/25 8231       Physical Activity    On average, how many days per week do you engage in moderate to strenuous exercise (like a brisk walk)? 0 days    On average, how many minutes do you engage in exercise at this level? 0 min    Number of minutes of exercise per week 0       Functional Status, IADL    Medications completely dependent    Meal Preparation completely dependent    Housekeeping completely dependent    Laundry completely dependent    Shopping completely dependent    If for any reason you need help with day-to-day activities such as bathing, preparing meals, shopping, managing finances, etc., do you get the help you need? I get all the help I need       Mental Status    General Appearance WDL WDL                   Psychosocial    No documentation.                  Abuse/Neglect    No  documentation.                  Legal    No documentation.                  Substance Abuse    No documentation.                  Patient Forms    No documentation.                     Pamela Dubon RN

## 2025-05-04 NOTE — PLAN OF CARE
Goal Outcome Evaluation:  Plan of Care Reviewed With: patient, child           Outcome Evaluation: PT evaluation completed this date with pt presenting supine in bed, no c/o pain, and O x 2 and with cues x 3. Pt was on 4 L NC with O2 sat at 96% and  b/min. Pt expressed that she was too tired to work with PT because she did not sleep well but then pt needed to use BSC so therapist was able to evaluate pt's mobility. Pt was very impulsive and as soon as she said she needed to urinated began getting OOB. Therapsit provided pt with safety cues due to multiple medical lines. Pt was able to come to sit on EOB with HOB raised using L rail. Pt was able to sit on EOB with SBA and cues. During stand pivot to BSC, gt belt in use and pt able to come to stand with CGA and begin her pivot to BSC but had trouble understanding that in order to sit she needed to let go of bed rail. Pt expressed fear of falling as therapist assisted pt in reaching back to arm rest to sit. For pivot back to bed pt was able to process the transfers with less cues. Pt was short of breath post second transfer with O2 sat at 93% and pt kept asking to lay back down. Pt was able to get BLE's up into bed by herself. Pt presents with deficits in endurance, balance, and strength. She is expected to improve her functional mobility with continued PT services prior to d/c.    Anticipated Discharge Disposition (PT): home with assist, home with home health

## 2025-05-04 NOTE — CONSULTS
[facility]   Cardiology Consult Note    Patient Name: Gabi Dudley  : 1945  MRN: [id number]  Primary Care Physician:  Krystina Saunders DO  Referring Physician: Ramses Chaudhari DO  Date of admission: 5/3/2025    Subjective   Subjective     Reason for Consult/ Chief Complaint: Atrial flutter with rapid ventricular response    HPI:  Gabi Dudley is a 80 y.o. female with past medical history as outlined below, remarkable for paroxysmal atrial fibrillation/flutter, on Eliquis and amiodarone, COPD, on supplemental oxygen at home, generalized anxiety disorder, presents to the emergency room with worsening shortness of breath for about 2 weeks.  She reports getting short of breath with minimal effort.  She failed outpatient antibiotic treatment for acute bronchitis.  She was getting short of breath at rest and started getting confused which prompted her family to bring her to the emergency room for further evaluation.  She was found to be in acute COPD exacerbation.  She was admitted for further management.  This morning, she went into atrial flutter with rapid ventricular response which prompted this consultation.  She reports feeling tired today.  She could not get any sleep last  night.  She has no chest pain, dizziness or other complaints.    Review of Systems   All systems were reviewed and negative except for: As above    Personal History     Past Medical History:   Diagnosis Date    Adrenal adenoma     Anemia     Arrhythmia     Asthma     Atrial fibrillation     Back pain     Benign colonic polyp     Benign tumor of adrenal gland     Cataract     bilateral    Cholelithiasis     Chronic bronchitis     Chronic bronchitis with COPD (chronic obstructive pulmonary disease)     COPD (chronic obstructive pulmonary disease)     Coronary artery disease     Depression     Diverticulosis Years ago    Elevated cholesterol     Environmental and seasonal allergies     Fibromyalgia     Fibromyalgia, primary  Sometime in the 90s    Gastritis     Generalized anxiety disorder     GERD (gastroesophageal reflux disease)     H/O mammogram     Headache Years ago    Hemorrhoids     History of blood transfusion 1985    History of blood transfusion 1985    History of echocardiogram     History of endometriosis     History of nuclear stress test     Hospitalization or health care facility admission within last 6 months     5 times between 11/2022-05/2023    Hypertension     IBS (irritable bowel syndrome)     Impaired functional mobility, balance, gait, and endurance     Impaired mobility     Inverted nipple     Kidney disease     Kidney stone     Liver cyst     Low back pain Years ago    Nodular radiologic density     Nodule of left lung     NSTEMI (non-ST elevated myocardial infarction) 9/24/2024    On home oxygen therapy     2 liters NC QHS    Osteoarthritis     Osteopenia Several years ago    Osteoporosis     PONV (postoperative nausea and vomiting)     Renal cyst     Sinus problem     2014    Sinusitis     Skin cancer     basal cell carcinoma    SOB (shortness of breath)     Tobacco use     Urinary frequency     Urinary tract infection Years ago    Frequent UTIs    Vitamin D deficiency     Wears glasses     Wears partial dentures     upper plate          Family History: family history includes Arthritis in her father; Brain cancer in her father; Cancer in her father; Colon cancer in her maternal aunt; Heart disease in her mother; Hypertension in her father; Lung cancer in her paternal grandfather; Stomach cancer in her brother. Otherwise pertinent FHx was reviewed and not pertinent to current issue.    Social History:  reports that she quit smoking about 4 years ago. Her smoking use included cigarettes. She started smoking about 34 years ago. She has a 7.5 pack-year smoking history. She has been exposed to tobacco smoke. She has never used smokeless tobacco. She reports that she does not drink alcohol and does not use  drugs.    Home Medications:  Budeson-Glycopyrrol-Formoterol, DULoxetine, Diclofenac Sodium, O2, QUEtiapine, albuterol, albuterol sulfate HFA, amiodarone, amoxicillin-clavulanate, apixaban, atorvastatin, benzonatate, clotrimazole-betamethasone, diazePAM, fluconazole, fluticasone, furosemide, ipratropium-albuterol, levocetirizine, meclizine, methocarbamol, metoprolol succinate XL, naloxone, ondansetron ODT, oxyCODONE-acetaminophen, pantoprazole, prochlorperazine, saline, sertraline, sodium chloride, traZODone, and valsartan    Allergies:  Allergies   Allergen Reactions    Ativan [Lorazepam] Mental Status Change    Doxycycline GI Intolerance       Objective    Objective     Vitals:   Temp:  [97.5 °F (36.4 °C)-98.2 °F (36.8 °C)] 98 °F (36.7 °C)  Heart Rate:  [] 115  Resp:  [16-24] 16  BP: (102-145)/(42-82) 102/63  Flow (L/min) (Oxygen Therapy):  [4-6] 4      Physical Exam:   Constitutional: Awake, alert, in mild respiratory distress, chronically ill-appearing   Eyes: PERRLA, sclerae anicteric, no conjunctival injection   HENT: NCAT, mucous membranes moist   Neck: Supple, no thyromegaly, no lymphadenopathy, trachea midline   Respiratory: Diffuse reduced airway entry with diffuse rhonchi and scattered end expiratory wheezing   Cardiovascular: Tachycardic, irregular rhythm, distant heart sounds, no rubs, or gallops, palpable pedal pulses bilaterally   Gastrointestinal: Positive bowel sounds, soft, nontender, nondistended   Musculoskeletal: No bilateral ankle edema, no clubbing or cyanosis to extremities   Psychiatric: Appropriate affect, cooperative   Neurologic: Grossly nonfocal   Skin: No rashes     Result Review    Result Review:  I have personally reviewed the results from the time of this admission to 5/4/2025 11:55 EDT and agree with these findings:  [x]  Laboratory  []  Microbiology  [x]  Radiology  [x]  EKG/Telemetry   [x]  Cardiology/Vascular   []  Pathology  [x]  Old records  []  Other:  Most notable  findings include:     CMP          4/9/2025    21:23 5/3/2025    14:06 5/4/2025    06:25   CMP   Glucose 113  127  135    BUN 16  17  23    Creatinine 0.59  0.57  0.65    EGFR 92.4  93.2  90.2    Sodium 143  143  143    Potassium 4.3  4.3  4.3    Chloride 98  96  95    Calcium 9.3  9.3  9.1    Total Protein 6.7  6.4     Albumin 4.0  3.7     Globulin 2.7  2.7     Total Bilirubin 0.3  0.2     Alkaline Phosphatase 99  96     AST (SGOT) 65  29     ALT (SGPT) 46  19     Albumin/Globulin Ratio 1.5  1.4     BUN/Creatinine Ratio 27.1  29.8  35.4    Anion Gap 7.0  1.2  4.3       CBC          4/9/2025    21:23 5/3/2025    14:06 5/4/2025    06:25   CBC   WBC 5.43  4.95  5.37    RBC 4.19  3.80  3.62    Hemoglobin 11.0  10.0  9.5    Hematocrit 37.0  35.7  32.8    MCV 88.3  93.9  90.6    MCH 26.3  26.3  26.2    MCHC 29.7  28.0  29.0    RDW 15.0  14.9  14.8    Platelets 143  167  160       Lab Results   Component Value Date    TROPONINT 13 05/03/2025         Assessment & Plan   Assessment / Plan     Assessment:    Paroxysmal atrial flutter with rapid ventricular response, likely precipitated by COPD exacerbation.  History of paroxysmal atrial fibrillation, on amiodarone and Eliquis.  Acute exacerbation of COPD.  Chronic HFrEF with normalization of LV systolic function on echocardiogram from today.  She mild fluid overload by exam.  Essential hypertension, stable.  Dyslipidemia.  Generalized anxiety disorder.  Tobacco use disorder.    Plan:   Will start diltiazem 30 mg every 6 hours.  Hold for systolic blood pressure less than 90.  Continue Toprol-XL 25 mg daily.  Continue amiodarone 200 mg daily.  Continue Eliquis 5 mg twice daily.  If she does not convert to sinus rhythm over the next 1 to 2 days, will proceed with DC cardioversion.  Continue furosemide.  Monitor fluid status.  Will hold valsartan due to soft blood pressure.  Management of COPD exacerbation as per primary team.    Thank for the consultation.  Please call with any  questions.    Electronically signed by Gabriel Grant MD, 05/04/25, 11:55 AM EDT.

## 2025-05-04 NOTE — PLAN OF CARE
Goal Outcome Evaluation:   VSS. No acute events noted through this shift. Daughter at bedside. Care continues.

## 2025-05-05 LAB
ALBUMIN SERPL-MCNC: 3.2 G/DL (ref 3.5–5.2)
ALBUMIN/GLOB SERPL: 1.3 G/DL
ALP SERPL-CCNC: 73 U/L (ref 39–117)
ALT SERPL W P-5'-P-CCNC: 28 U/L (ref 1–33)
ANION GAP SERPL CALCULATED.3IONS-SCNC: 1.2 MMOL/L (ref 5–15)
AST SERPL-CCNC: 31 U/L (ref 1–32)
BILIRUB SERPL-MCNC: 0.2 MG/DL (ref 0–1.2)
BUN SERPL-MCNC: 34 MG/DL (ref 8–23)
BUN/CREAT SERPL: 52.3 (ref 7–25)
CALCIUM SPEC-SCNC: 8.9 MG/DL (ref 8.6–10.5)
CHLORIDE SERPL-SCNC: 97 MMOL/L (ref 98–107)
CO2 SERPL-SCNC: 46.8 MMOL/L (ref 22–29)
CREAT SERPL-MCNC: 0.65 MG/DL (ref 0.57–1)
DEPRECATED RDW RBC AUTO: 50 FL (ref 37–54)
EGFRCR SERPLBLD CKD-EPI 2021: 90.2 ML/MIN/1.73
ERYTHROCYTE [DISTWIDTH] IN BLOOD BY AUTOMATED COUNT: 15.1 % (ref 12.3–15.4)
GLOBULIN UR ELPH-MCNC: 2.5 GM/DL
GLUCOSE SERPL-MCNC: 108 MG/DL (ref 65–99)
HCT VFR BLD AUTO: 31.6 % (ref 34–46.6)
HGB BLD-MCNC: 8.9 G/DL (ref 12–15.9)
MCH RBC QN AUTO: 25.7 PG (ref 26.6–33)
MCHC RBC AUTO-ENTMCNC: 28.2 G/DL (ref 31.5–35.7)
MCV RBC AUTO: 91.3 FL (ref 79–97)
PLATELET # BLD AUTO: 197 10*3/MM3 (ref 140–450)
PMV BLD AUTO: 11 FL (ref 6–12)
POTASSIUM SERPL-SCNC: 4.3 MMOL/L (ref 3.5–5.2)
PROT SERPL-MCNC: 5.7 G/DL (ref 6–8.5)
QT INTERVAL: 392 MS
QTC INTERVAL: 540 MS
RBC # BLD AUTO: 3.46 10*6/MM3 (ref 3.77–5.28)
SODIUM SERPL-SCNC: 145 MMOL/L (ref 136–145)
WBC NRBC COR # BLD AUTO: 9.47 10*3/MM3 (ref 3.4–10.8)

## 2025-05-05 PROCEDURE — 63710000001 PREDNISONE PER 1 MG: Performed by: NURSE PRACTITIONER

## 2025-05-05 PROCEDURE — 94799 UNLISTED PULMONARY SVC/PX: CPT

## 2025-05-05 PROCEDURE — 25010000002 PIPERACILLIN SOD-TAZOBACTAM PER 1 G: Performed by: FAMILY MEDICINE

## 2025-05-05 PROCEDURE — 94761 N-INVAS EAR/PLS OXIMETRY MLT: CPT

## 2025-05-05 PROCEDURE — 94664 DEMO&/EVAL PT USE INHALER: CPT

## 2025-05-05 PROCEDURE — 99231 SBSQ HOSP IP/OBS SF/LOW 25: CPT | Performed by: INTERNAL MEDICINE

## 2025-05-05 PROCEDURE — 80053 COMPREHEN METABOLIC PANEL: CPT | Performed by: INTERNAL MEDICINE

## 2025-05-05 PROCEDURE — 63710000001 REVEFENACIN 175 MCG/3ML SOLUTION: Performed by: FAMILY MEDICINE

## 2025-05-05 PROCEDURE — 85027 COMPLETE CBC AUTOMATED: CPT | Performed by: INTERNAL MEDICINE

## 2025-05-05 PROCEDURE — 99232 SBSQ HOSP IP/OBS MODERATE 35: CPT | Performed by: NURSE PRACTITIONER

## 2025-05-05 PROCEDURE — 25010000002 METHYLPREDNISOLONE PER 125 MG: Performed by: FAMILY MEDICINE

## 2025-05-05 RX ORDER — METOPROLOL SUCCINATE 50 MG/1
50 TABLET, EXTENDED RELEASE ORAL
Status: DISCONTINUED | OUTPATIENT
Start: 2025-05-05 | End: 2025-05-06 | Stop reason: HOSPADM

## 2025-05-05 RX ORDER — PREDNISONE 20 MG/1
40 TABLET ORAL
Status: DISCONTINUED | OUTPATIENT
Start: 2025-05-05 | End: 2025-05-06 | Stop reason: HOSPADM

## 2025-05-05 RX ORDER — TRAZODONE HYDROCHLORIDE 50 MG/1
25 TABLET ORAL NIGHTLY
Status: DISCONTINUED | OUTPATIENT
Start: 2025-05-05 | End: 2025-05-06 | Stop reason: HOSPADM

## 2025-05-05 RX ORDER — SIMETHICONE 80 MG
40 TABLET,CHEWABLE ORAL 4 TIMES DAILY PRN
Status: DISCONTINUED | OUTPATIENT
Start: 2025-05-05 | End: 2025-05-06 | Stop reason: HOSPADM

## 2025-05-05 RX ADMIN — SIMETHICONE 40 MG: 80 TABLET, CHEWABLE ORAL at 11:46

## 2025-05-05 RX ADMIN — OXYCODONE HYDROCHLORIDE AND ACETAMINOPHEN 1 TABLET: 7.5; 325 TABLET ORAL at 13:03

## 2025-05-05 RX ADMIN — BUDESONIDE INHALATION 0.5 MG: 0.5 SUSPENSION RESPIRATORY (INHALATION) at 07:12

## 2025-05-05 RX ADMIN — METOPROLOL SUCCINATE 50 MG: 50 TABLET, EXTENDED RELEASE ORAL at 08:51

## 2025-05-05 RX ADMIN — APIXABAN 5 MG: 5 TABLET, FILM COATED ORAL at 21:15

## 2025-05-05 RX ADMIN — AMIODARONE HYDROCHLORIDE 200 MG: 200 TABLET ORAL at 08:52

## 2025-05-05 RX ADMIN — Medication 10 ML: at 08:50

## 2025-05-05 RX ADMIN — Medication 10 ML: at 21:16

## 2025-05-05 RX ADMIN — PREDNISONE 40 MG: 20 TABLET ORAL at 13:03

## 2025-05-05 RX ADMIN — ARFORMOTEROL TARTRATE 15 MCG: 15 SOLUTION RESPIRATORY (INHALATION) at 07:12

## 2025-05-05 RX ADMIN — GUAIFENESIN 600 MG: 600 TABLET, EXTENDED RELEASE ORAL at 08:48

## 2025-05-05 RX ADMIN — BUDESONIDE INHALATION 0.5 MG: 0.5 SUSPENSION RESPIRATORY (INHALATION) at 19:04

## 2025-05-05 RX ADMIN — PIPERACILLIN AND TAZOBACTAM 3.38 G: 3; .375 INJECTION, POWDER, FOR SOLUTION INTRAVENOUS at 00:53

## 2025-05-05 RX ADMIN — ARFORMOTEROL TARTRATE 15 MCG: 15 SOLUTION RESPIRATORY (INHALATION) at 19:04

## 2025-05-05 RX ADMIN — DULOXETINE HYDROCHLORIDE 60 MG: 30 CAPSULE, DELAYED RELEASE ORAL at 21:15

## 2025-05-05 RX ADMIN — DULOXETINE HYDROCHLORIDE 60 MG: 30 CAPSULE, DELAYED RELEASE ORAL at 08:48

## 2025-05-05 RX ADMIN — TRAZODONE HYDROCHLORIDE 25 MG: 50 TABLET ORAL at 21:15

## 2025-05-05 RX ADMIN — APIXABAN 5 MG: 5 TABLET, FILM COATED ORAL at 08:49

## 2025-05-05 RX ADMIN — ATORVASTATIN CALCIUM 40 MG: 40 TABLET, FILM COATED ORAL at 08:48

## 2025-05-05 RX ADMIN — REVEFENACIN 175 MCG: 175 SOLUTION RESPIRATORY (INHALATION) at 07:12

## 2025-05-05 RX ADMIN — DILTIAZEM HYDROCHLORIDE 30 MG: 60 TABLET, FILM COATED ORAL at 06:52

## 2025-05-05 RX ADMIN — METHYLPREDNISOLONE SODIUM SUCCINATE 62.5 MG: 125 INJECTION INTRAMUSCULAR; INTRAVENOUS at 08:50

## 2025-05-05 RX ADMIN — GUAIFENESIN 600 MG: 600 TABLET, EXTENDED RELEASE ORAL at 21:15

## 2025-05-05 NOTE — THERAPY EVALUATION
OT jr attempted x 2 this date. This am held per patient request d/t fatigue and also hypotensive, this pm patient declined d/t increased pain. Will follow up with patient tomorrow.

## 2025-05-05 NOTE — PLAN OF CARE
Goal Outcome Evaluation:  Plan of Care Reviewed With: patient         VSS, pt maintaining sats >90% on 4 L NC, and pt NSR on telemetry att. Medications administered per orders. Discharge is pending.

## 2025-05-05 NOTE — PROGRESS NOTES
[facility] HOSPITALIST    PROGRESS NOTE    Name:  Gabi Dudley   Age:  80 y.o.  Sex:  female  :  1945  MRN:  [id number]   Visit Number:  [id number]  Admission Date:  5/3/2025  Date Of Service:  25  Primary Care Physician:  Krystina Saunders DO     LOS: 0 days :    Chief Complaint:      Shortness of air    Subjective:    Patient seen and examined, on 3 L nasal cannula, states feeling improved.  Blood pressure soft, advised would hold any further Lasix and diltiazem at this time.  Will continue the amiodarone and metoprolol.    Hospital Course:    Ms. Reed is a pleasant 80-year-old female with pertinent past medical history of chronic respiratory failure with COPD on 4 L nasal cannula, hypertension, paroxysmal atrial fibrillation on Eliquis, chronic systolic heart failure, depression and anxiety, anemia who presented to emergency department due to worsening shortness of air, onset 2 weeks duration, recently initiated on antibiotics with azithromycin and Augmentin with overall no improvement.  Patient currently lives with her daughter.  Denied fever or swelling.    Upon ED presentation patient noted to require 6 L of oxygen, otherwise hemodynamically stable.  Pertinent labs and imaging noted proBNP 2000, respiratory panel negative, pCO2 85.4 with pH 7.3 and PO2 of 74, hemoglobin 9.5, chest x-ray worsening interstitial opacities favor pulmonary edema.  Hospitalist consulted for further medical management.  Initiated on Zosyn/IV Solu-Medrol/Lasix.  Cardiology consulted as patient A-fib with RVR, added diltiazem, suggested cardioversion if does not convert to sinus rhythm.    Review of Systems:     All systems were reviewed and negative except as mentioned in subjective, assessment and plan.    Vital Signs:    Temp:  [97.5 °F (36.4 °C)-98.5 °F (36.9 °C)] 97.5 °F (36.4 °C)  Heart Rate:  [] 71  Resp:  [16-20] 16  BP: ()/(46-77) 98/58    Intake and output:    I/O last 3  completed shifts:  In: 210 [P.O.:210]  Out: 480 [Urine:480]  No intake/output data recorded.    Physical Examination:    General Appearance:  Alert and cooperative.  Chronically ill middle-aged female.   Head:  Atraumatic and normocephalic.   Eyes: Conjunctivae and sclerae normal, no icterus. No pallor.   Throat: No oral lesions, no thrush, oral mucosa moist.   Neck: Supple, trachea midline, no thyromegaly.   Lungs:   Breath sounds heard bilaterally equally.  On 3 L nasal cannula unlabored.   Heart:  Normal S1 and S2, no murmur, no gallop, no rub. No JVD.   Abdomen:   Normal bowel sounds, no masses, no organomegaly. Soft, nontender, nondistended, no rebound tenderness.   Extremities: Supple, no edema, no cyanosis, no clubbing.   Skin: No bleeding or rash.   Neurologic: Alert and oriented x 3. No facial asymmetry. Moves all four limbs. No tremors.  Severe generalized weakness.     Laboratory results:    Results from last 7 days   Lab Units 05/05/25  0537 05/04/25  0625 05/03/25  1406   SODIUM mmol/L 145 143 143   POTASSIUM mmol/L 4.3 4.3 4.3   CHLORIDE mmol/L 97* 95* 96*   CO2 mmol/L 46.8* 43.7* 45.8*   BUN mg/dL 34* 23 17   CREATININE mg/dL 0.65 0.65 0.57   CALCIUM mg/dL 8.9 9.1 9.3   BILIRUBIN mg/dL 0.2  --  0.2   ALK PHOS U/L 73  --  96   ALT (SGPT) U/L 28  --  19   AST (SGOT) U/L 31  --  29   GLUCOSE mg/dL 108* 135* 127*     Results from last 7 days   Lab Units 05/05/25  0537 05/04/25  0625 05/03/25  1406   WBC 10*3/mm3 9.47 5.37 4.95   HEMOGLOBIN g/dL 8.9* 9.5* 10.0*   HEMATOCRIT % 31.6* 32.8* 35.7   PLATELETS 10*3/mm3 197 160 167         Results from last 7 days   Lab Units 05/03/25  1516 05/03/25  1406   HSTROP T ng/L 13 13         Recent Labs     12/23/24  0839 01/02/25  1825 01/03/25  0012 05/03/25  1613   PHART 7.373 7.365  --  7.350   KQO5DEJ 67.1* 66.0*  --  85.4*   PO2ART 82.6 84.0  --  74.2*   YGM0FWQ 39.1* 37.7*  --  47.1*   BASEEXCESS 11.7* 10.6* 7.7* 18.4*      I have reviewed the patient's  laboratory results.    Radiology results:    XR Chest 1 View  Result Date: 5/3/2025  PROCEDURE: XR CHEST 1 VW-  INDICATION:  SOA Triage Protocol  FINDINGS:  A portable view of the chest was obtained.  Comparison is made to a prior exam dated 4/9/2025.   There is stable mild cardiomegaly. There is been slight worsening of bilateral interstitial opacities. This may represent pulmonary edema. No pleural effusion or pneumothorax.      Impression: Worsening interstitial opacities. Favor pulmonary edema.   This report was signed and finalized on 5/3/2025 2:36 PM by Loan Carbone MD.      I have reviewed the patient's radiology reports.    Medication Review:     I have reviewed the patient's active and prn medications.     Problem List:      COPD exacerbation    COPD (chronic obstructive pulmonary disease)      Assessment:    Acute on chronic hypoxic hypercapnic respiratory failure  Acute exacerbation of COPD  Acute exacerbation of systolic congestive heart failure  Atrial fibrillation on Eliquis  Hypertension  Hyperlipidemia  Tobacco use disorder  Osteoporosis  Osteoarthritis  Fibromyalgia  Anxiety  Depression  Vitamin D deficiency    Plan:    Acute on chronic hypoxic hypercapnic respiratory failure  Acute exacerbation of COPD  Acute exacerbation of systolic congestive heart failure  Supportive oxygen.  BiPAP if necessary.  Apparently patient has BiPAP at home and does not use.  Discontinue IV Zosyn at this time, do not feel as if patient has bacterial pneumonia.  DuoNebs, Solu-Medrol, Mucinex  Brovana, Pulmicort, Yupelri  Flutter valve.  Patient appears euvolemic, continue GDMT as tolerated.  Will hold further Lasix at this time as she does appear quite dry.  Will follow-up with heart failure clinic outpatient.  Will follow-up with COPD clinic outpatient.  Atrial fibrillation  Resume Eliquis, amiodarone, metoprolol  Episode of RVR noted with cardiology recommending diltiazem, unable to tolerate due to soft blood  pressure.  Will increase metoprolol.  Currently is in sinus rhythm.  Hypertension  Hyperlipidemia  Tobacco use disorder  Osteoporosis  Osteoarthritis  Fibromyalgia  Anxiety  Depression  Vitamin D deficiency  Resume home medications as appropriate    Goal is to return home with home health.    I have reviewed the copied text and it is accurate as of 5/5/2025      DVT Prophylaxis: Eliquis  Code Status: Full code  Diet: Consistent carb, cardiac  Discharge Plan: Home in 1 to 2 days.    Britt Nascimento, REGINA  05/05/25  12:30 EDT    Dictated utilizing Dragon dictation.

## 2025-05-05 NOTE — CASE MANAGEMENT/SOCIAL WORK
Case Management/Social Work    Patient Name:  Gabi Dudley  YOB: 1945  MRN: [id number]  Admit Date:  5/3/2025        10:10 EDT   Discharge Plan       Row Name 05/05/25 1010       Plan    Plan DCP; Home with HH.    Plan Comments Met with pt at bedside. Pt had HH in the past but could not remember the name of the agency. She asked me to call her daughter. No immediate needs. CM continues to follow.  12:55 LM on daughters VM to call back to discuss HH agencies.  16:30 Spoke with daughter Malu over the phone. No preference for HH agencies. She is also interested in the disease state management clinic at discharge. [facility] delivered at the time of this conversation.                        Electronically signed by:  Janiya Sotelo RN  05/05/25 12:56 EDT

## 2025-05-05 NOTE — PROGRESS NOTES
"[facility]-Cardiology Progress note     LOS: 0 days   Patient Care Team:  Krystina Saunders DO as PCP - General (Family Medicine)  Sima Moses MD as Consulting Physician (General Surgery)  Taiwo Curry MD as Consulting Physician (Cardiology)  Soledad Stauffer, EFRAÍN as Ambulatory  ([facility])    Chief Complaint: Shortness of breath    Subjective:    Interval History:     Patient Complaints: none  Patient Denies:   Chest pain, shortness of breath, orthopnea, peripheral edema, palpitations, cough, sputum production, hemoptysis, abdominal pain, nausea, vomiting, diarrhea, fevers chills or night sweats  History taken from: patient    Review of Systems:   All systems were reviewed and negative       Objective:    Vital Sign Min/Max for last 24 hours  Temp  Min: 97.5 °F (36.4 °C)  Max: 98 °F (36.7 °C)   BP  Min: 93/46  Max: 116/64   Pulse  Min: 63  Max: 116   Resp  Min: 12  Max: 20   SpO2  Min: 90 %  Max: 100 %   Flow (L/min) (Oxygen Therapy)  Min: 4  Max: 4   No data recorded     Flowsheet Rows      Flowsheet Row First Filed Value   Admission Height 165.1 cm (65\") Documented at 05/03/2025 1401   Admission Weight 59 kg (130 lb) Documented at 05/03/2025 1401            Physical Exam:     General Appearance:  Alert, cooperative, in no acute distress.  Elderly, frail, cachectic   Head:  Normocephalic, without obvious abnormality, atraumatic   Eyes:  Lids and lashes normal, conjunctivae and sclerae normal, no icterus, no pallor, corneas clear, PERRLA   Ears:  Ears appear intact with no abnormalities noted   Throat:  No oral lesions, no thrush, oral mucosa moist   Neck:  No adenopathy, supple, trachea midline, no thyromegaly, no carotid bruit, no JVD   Back:  No kyphosis present, no scoliosis present, no skin lesions, erythema or scars, no tenderness to percussion, or palpation, range of motion normal   Lungs:  Clear to auscultation,respirations regular, even and unlabored    Heart:  Regular rhythm and " normal rate, normal S1 and S2, no murmur, no gallop, no rub, no click   Breast Exam:  Deferred   Abdomen:  Normal bowel sounds, no masses, no organomegaly, soft non-tender, non-distended, no guarding, no rebound tenderness   Genitalia:  Deferred   Extremities:  Moves all extremities well, no edema, no cyanosis, no redness   Pulses:  Pulses palpable and equal bilaterally   Skin:  No bleeding, bruising or rash   Lymph nodes:  No palpable adenopathy   Neurologic:  Cranial nerves 2 - 12 grossly intact, sensation intact, DTR present and equal bilaterally        Results Review:     I reviewed the patient's new clinical results.      Results from last 7 days   Lab Units 05/05/25  0537   SODIUM mmol/L 145   POTASSIUM mmol/L 4.3   CHLORIDE mmol/L 97*   CO2 mmol/L 46.8*   BUN mg/dL 34*   CREATININE mg/dL 0.65   GLUCOSE mg/dL 108*   CALCIUM mg/dL 8.9     Results from last 7 days   Lab Units 05/05/25  0537 05/04/25  0625 05/03/25  1406   WBC 10*3/mm3 9.47 5.37 4.95   HEMOGLOBIN g/dL 8.9* 9.5* 10.0*   HEMATOCRIT % 31.6* 32.8* 35.7   PLATELETS 10*3/mm3 197 160 167           Echo EF Estimated  Lab Results   Component Value Date    ECHOEFEST 40.0 07/23/2024         Physical Exam    Medication Review: yes    Assessment/Plan:      COPD exacerbation    COPD (chronic obstructive pulmonary disease)      Pharmacologic cardioversion of atrial flutter to sinus rhythm with amiodarone therapy.  Discharged to home with Eliquis 5 mg orally twice daily and amiodarone 200 mg once per day.  Outpatient cardiology follow-up with her established cardiologist-Dr. Curry.    Plan for disposition:Where: home and When:  tomorrow    Gregory Carrera MD  05/05/25  16:52 EDT

## 2025-05-06 ENCOUNTER — READMISSION MANAGEMENT (OUTPATIENT)
Dept: CALL CENTER | Facility: HOSPITAL | Age: 80
End: 2025-05-06
Payer: MEDICARE

## 2025-05-06 ENCOUNTER — TELEPHONE (OUTPATIENT)
Dept: INTERNAL MEDICINE | Facility: CLINIC | Age: 80
End: 2025-05-06
Payer: MEDICARE

## 2025-05-06 VITALS
SYSTOLIC BLOOD PRESSURE: 109 MMHG | HEART RATE: 65 BPM | OXYGEN SATURATION: 99 % | BODY MASS INDEX: 21.85 KG/M2 | WEIGHT: 131.17 LBS | RESPIRATION RATE: 16 BRPM | TEMPERATURE: 98.1 F | HEIGHT: 65 IN | DIASTOLIC BLOOD PRESSURE: 57 MMHG

## 2025-05-06 LAB
ANION GAP SERPL CALCULATED.3IONS-SCNC: 1.4 MMOL/L (ref 5–15)
BASOPHILS # BLD AUTO: 0.01 10*3/MM3 (ref 0–0.2)
BASOPHILS NFR BLD AUTO: 0.1 % (ref 0–1.5)
BUN SERPL-MCNC: 34 MG/DL (ref 8–23)
BUN/CREAT SERPL: 57.6 (ref 7–25)
CALCIUM SPEC-SCNC: 8.9 MG/DL (ref 8.6–10.5)
CHLORIDE SERPL-SCNC: 96 MMOL/L (ref 98–107)
CO2 SERPL-SCNC: 43.6 MMOL/L (ref 22–29)
CREAT SERPL-MCNC: 0.59 MG/DL (ref 0.57–1)
DEPRECATED RDW RBC AUTO: 49.9 FL (ref 37–54)
EGFRCR SERPLBLD CKD-EPI 2021: 92.4 ML/MIN/1.73
EOSINOPHIL # BLD AUTO: 0 10*3/MM3 (ref 0–0.4)
EOSINOPHIL NFR BLD AUTO: 0 % (ref 0.3–6.2)
ERYTHROCYTE [DISTWIDTH] IN BLOOD BY AUTOMATED COUNT: 15.1 % (ref 12.3–15.4)
GLUCOSE SERPL-MCNC: 119 MG/DL (ref 65–99)
HCT VFR BLD AUTO: 31.6 % (ref 34–46.6)
HGB BLD-MCNC: 9.2 G/DL (ref 12–15.9)
IMM GRANULOCYTES # BLD AUTO: 0.06 10*3/MM3 (ref 0–0.05)
IMM GRANULOCYTES NFR BLD AUTO: 0.7 % (ref 0–0.5)
LYMPHOCYTES # BLD AUTO: 0.66 10*3/MM3 (ref 0.7–3.1)
LYMPHOCYTES NFR BLD AUTO: 7.5 % (ref 19.6–45.3)
MCH RBC QN AUTO: 26.4 PG (ref 26.6–33)
MCHC RBC AUTO-ENTMCNC: 29.1 G/DL (ref 31.5–35.7)
MCV RBC AUTO: 90.5 FL (ref 79–97)
MONOCYTES # BLD AUTO: 0.39 10*3/MM3 (ref 0.1–0.9)
MONOCYTES NFR BLD AUTO: 4.4 % (ref 5–12)
NEUTROPHILS NFR BLD AUTO: 7.68 10*3/MM3 (ref 1.7–7)
NEUTROPHILS NFR BLD AUTO: 87.3 % (ref 42.7–76)
NRBC BLD AUTO-RTO: 0 /100 WBC (ref 0–0.2)
PLATELET # BLD AUTO: 196 10*3/MM3 (ref 140–450)
PMV BLD AUTO: 10.7 FL (ref 6–12)
POTASSIUM SERPL-SCNC: 4.2 MMOL/L (ref 3.5–5.2)
RBC # BLD AUTO: 3.49 10*6/MM3 (ref 3.77–5.28)
SODIUM SERPL-SCNC: 141 MMOL/L (ref 136–145)
WBC NRBC COR # BLD AUTO: 8.8 10*3/MM3 (ref 3.4–10.8)

## 2025-05-06 PROCEDURE — 94761 N-INVAS EAR/PLS OXIMETRY MLT: CPT

## 2025-05-06 PROCEDURE — 63710000001 PREDNISONE PER 1 MG: Performed by: NURSE PRACTITIONER

## 2025-05-06 PROCEDURE — 94799 UNLISTED PULMONARY SVC/PX: CPT

## 2025-05-06 PROCEDURE — 85025 COMPLETE CBC W/AUTO DIFF WBC: CPT | Performed by: NURSE PRACTITIONER

## 2025-05-06 PROCEDURE — 97166 OT EVAL MOD COMPLEX 45 MIN: CPT

## 2025-05-06 PROCEDURE — 97116 GAIT TRAINING THERAPY: CPT

## 2025-05-06 PROCEDURE — 25010000002 ONDANSETRON PER 1 MG: Performed by: FAMILY MEDICINE

## 2025-05-06 PROCEDURE — 63710000001 REVEFENACIN 175 MCG/3ML SOLUTION: Performed by: FAMILY MEDICINE

## 2025-05-06 PROCEDURE — 97530 THERAPEUTIC ACTIVITIES: CPT

## 2025-05-06 PROCEDURE — 99239 HOSP IP/OBS DSCHRG MGMT >30: CPT | Performed by: NURSE PRACTITIONER

## 2025-05-06 PROCEDURE — 80048 BASIC METABOLIC PNL TOTAL CA: CPT | Performed by: NURSE PRACTITIONER

## 2025-05-06 RX ORDER — FUROSEMIDE 20 MG/1
20 TABLET ORAL DAILY PRN
Qty: 30 TABLET | Refills: 0 | Status: SHIPPED | OUTPATIENT
Start: 2025-05-06 | End: 2025-06-05

## 2025-05-06 RX ORDER — PREDNISONE 20 MG/1
40 TABLET ORAL
Qty: 6 TABLET | Refills: 0 | Status: SHIPPED | OUTPATIENT
Start: 2025-05-07 | End: 2025-05-10

## 2025-05-06 RX ORDER — METOPROLOL SUCCINATE 50 MG/1
50 TABLET, EXTENDED RELEASE ORAL
Qty: 30 TABLET | Refills: 0 | Status: SHIPPED | OUTPATIENT
Start: 2025-05-07

## 2025-05-06 RX ORDER — TRAZODONE HYDROCHLORIDE 50 MG/1
25 TABLET ORAL NIGHTLY
Qty: 30 TABLET | Refills: 0 | Status: SHIPPED | OUTPATIENT
Start: 2025-05-06 | End: 2025-05-16 | Stop reason: HOSPADM

## 2025-05-06 RX ADMIN — PREDNISONE 40 MG: 20 TABLET ORAL at 08:52

## 2025-05-06 RX ADMIN — GUAIFENESIN 600 MG: 600 TABLET, EXTENDED RELEASE ORAL at 08:53

## 2025-05-06 RX ADMIN — ARFORMOTEROL TARTRATE 15 MCG: 15 SOLUTION RESPIRATORY (INHALATION) at 07:34

## 2025-05-06 RX ADMIN — BUDESONIDE INHALATION 0.5 MG: 0.5 SUSPENSION RESPIRATORY (INHALATION) at 07:34

## 2025-05-06 RX ADMIN — METOPROLOL SUCCINATE 50 MG: 50 TABLET, EXTENDED RELEASE ORAL at 08:52

## 2025-05-06 RX ADMIN — DULOXETINE HYDROCHLORIDE 60 MG: 30 CAPSULE, DELAYED RELEASE ORAL at 08:52

## 2025-05-06 RX ADMIN — Medication 10 ML: at 08:57

## 2025-05-06 RX ADMIN — ATORVASTATIN CALCIUM 40 MG: 40 TABLET, FILM COATED ORAL at 08:52

## 2025-05-06 RX ADMIN — REVEFENACIN 175 MCG: 175 SOLUTION RESPIRATORY (INHALATION) at 07:34

## 2025-05-06 RX ADMIN — ONDANSETRON 4 MG: 2 INJECTION INTRAMUSCULAR; INTRAVENOUS at 12:07

## 2025-05-06 RX ADMIN — AMIODARONE HYDROCHLORIDE 200 MG: 200 TABLET ORAL at 08:52

## 2025-05-06 RX ADMIN — APIXABAN 5 MG: 5 TABLET, FILM COATED ORAL at 08:52

## 2025-05-06 RX ADMIN — OXYCODONE HYDROCHLORIDE AND ACETAMINOPHEN 1 TABLET: 7.5; 325 TABLET ORAL at 08:53

## 2025-05-06 NOTE — OUTREACH NOTE
Prep Survey      Flowsheet Row Responses   [facility] patient discharged from? [place]   Is LACE score < 7 ? No   Eligibility [facility]   Date of Admission 05/03/25   Date of Discharge 05/06/25   Discharge Disposition Home-Health Care Sv   Discharge diagnosis COPD exacerbation   Does the patient have one of the following disease processes/diagnoses(primary or secondary)? COPD   Does the patient have Home health ordered? Yes   What is the Home health agency?  [facility]    Is there a DME ordered? No   Prep survey completed? Yes            Kianna HOLBROOK - Registered Nurse

## 2025-05-06 NOTE — CASE MANAGEMENT/SOCIAL WORK
Case Management/Social Work    Patient Name:  Gabi Dudley  YOB: 1945  MRN: [id number]  Admit Date:  5/3/2025        10:20 EDT   Discharge Plan       Row Name 05/06/25 1020       Plan    Plan DCP; Home with HH    Plan Comments Met with pt and her daughter at bedside. Daughter inquired about paid caregivers. List of local agencies provided. No preference for HH agencies. Daughter will provide transportation home at discharge.                        Electronically signed by:  Janiya Sotelo RN  05/06/25 12:39 EDT

## 2025-05-06 NOTE — THERAPY TREATMENT NOTE
Patient Name: Gabi Dudley  : 1945    MRN: [id number]                              Today's Date: 2025       Admit Date: 5/3/2025    Visit Dx:     ICD-10-CM ICD-9-CM   1. Acute on chronic systolic congestive heart failure  I50.23 428.23     428.0   2. COPD exacerbation  J44.1 491.21   3. Chronic obstructive pulmonary disease with acute lower respiratory infection  J44.0 496     519.8     Patient Active Problem List   Diagnosis    Adrenal adenoma    Anxiety    Chronic fatigue    Fibromyalgia    Hyperlipidemia    Essential hypertension    Insomnia    Osteoarthritis of knee    Osteoporosis    Pulmonary emphysema    Simple renal cyst    Tension headache    Vitamin D deficiency    Diverticulosis    Inversion of nipple    Paroxysmal atrial fibrillation    Coronary artery disease involving native coronary artery of native heart without angina pectoris    Autonomic dysfunction    Gastroesophageal reflux disease without esophagitis    Urge incontinence    Erythrasma    Compression fracture of T5 vertebra    Lung nodule    COPD (chronic obstructive pulmonary disease)    Overweight (BMI 25.0-29.9)    Acute on chronic respiratory failure with hypoxia    Acute on chronic respiratory failure with hypoxia and hypercapnia    Iron deficiency anemia    Impaired mobility and ADLs    Acute respiratory failure with hypoxia and hypercapnia    Aspiration pneumonia of right lower lobe    Partial small bowel obstruction    Moderate malnutrition    Bowel obstruction    Enteritis    Diverticulitis    Elevated troponin    Chronic combined systolic and diastolic CHF (congestive heart failure)    NSTEMI (non-ST elevated myocardial infarction)    COPD exacerbation     Past Medical History:   Diagnosis Date    Adrenal adenoma     Anemia     Arrhythmia     Asthma     Atrial fibrillation     Back pain     Benign colonic polyp     Benign tumor of adrenal gland     Cataract     bilateral    Cholelithiasis     Chronic bronchitis      Chronic bronchitis with COPD (chronic obstructive pulmonary disease)     COPD (chronic obstructive pulmonary disease) 2005    Coronary artery disease     Depression     Diverticulosis Years ago    Elevated cholesterol     Environmental and seasonal allergies     Fibromyalgia     Fibromyalgia, primary Sometime in the 90s    Gastritis     Generalized anxiety disorder     GERD (gastroesophageal reflux disease)     H/O mammogram     Headache Years ago    Hemorrhoids     History of blood transfusion 1985    History of blood transfusion 1985    History of echocardiogram     History of endometriosis     History of nuclear stress test     Hospitalization or health care facility admission within last 6 months     5 times between 11/2022-05/2023    Hypertension     IBS (irritable bowel syndrome)     Impaired functional mobility, balance, gait, and endurance     Impaired mobility     Inverted nipple     Kidney disease     Kidney stone     Liver cyst     Low back pain Years ago    Nodular radiologic density     Nodule of left lung     NSTEMI (non-ST elevated myocardial infarction) 9/24/2024    On home oxygen therapy     2 liters NC QHS    Osteoarthritis     Osteopenia Several years ago    Osteoporosis     PONV (postoperative nausea and vomiting)     Renal cyst     Sinus problem     2014    Sinusitis     Skin cancer     basal cell carcinoma    SOB (shortness of breath)     Tobacco use     Urinary frequency     Urinary tract infection Years ago    Frequent UTIs    Vitamin D deficiency     Wears glasses     Wears partial dentures     upper plate     Past Surgical History:   Procedure Laterality Date    APPENDECTOMY  1980s    CARDIAC CATHETERIZATION  2003    CATARACT EXTRACTION  2013    both eyes    CHOLECYSTECTOMY  2020    CHOLECYSTECTOMY WITH INTRAOPERATIVE CHOLANGIOGRAM N/A 10/30/2020    Procedure: CHOLECYSTECTOMY LAPAROSCOPIC INTRAOPERATIVE CHOLANGIOGRAPHY;  Surgeon: Sima Moses MD;  Location: [facility];  Service: General;   Laterality: N/A;    COLON SURGERY  2020    COLONOSCOPY  03/11/2013    COLONOSCOPY  06/21/2016    COLONOSCOPY N/A 07/24/2019    Procedure: COLONOSCOPY W/ COLD FORCEP POLYPECTOMIES; HOT SNARE POLYPECTOMIES; COLD SNARE POLYPECTOMY;  Surgeon: Goyo Nunez MD;  Location: [facility] ENDOSCOPY;  Service: Gastroenterology    COLONOSCOPY W/ BIOPSIES AND POLYPECTOMY      EXPLORATORY LAPAROTOMY N/A 11/23/2020    Procedure: colectomy, right, closure of enterotomy x 2, reduction of internal volvulus;  Surgeon: Sima Moses MD;  Location: [facility] OR;  Service: General;  Laterality: N/A;    EXPLORATORY LAPAROTOMY N/A 8/9/2023    Procedure: LAPAROTOMY EXPLORATORY WITH LYIS OF ADHESIONS AND  CENTRAL LINE INSERTION;  Surgeon: Bianca Isaac DO;  Location: [facility] OR;  Service: General;  Laterality: N/A;    HYSTERECTOMY  1980s    partial    LYSIS OF ABDOMINAL ADHESIONS      TONSILLECTOMY  1987    UPPER GASTROINTESTINAL ENDOSCOPY  01/13/2015    VAGINAL DELIVERY      x2      General Information       Row Name 05/06/25 1031          Physical Therapy Time and Intention    Document Type therapy note (daily note)  -LM     Mode of Treatment physical therapy  -       Row Name 05/06/25 1031          General Information    Patient Profile Reviewed yes  -LM     Existing Precautions/Restrictions fall;cardiac;oxygen therapy device and L/min  -LM     Barriers to Rehab medically complex;previous functional deficit  -LM       Row Name 05/06/25 1031          Safety Issues/Impairments Affecting Functional Mobility    Safety Issues Affecting Function (Mobility) impulsivity;at risk behavior observed;safety precaution awareness;safety precautions follow-through/compliance;insight into deficits/self-awareness;judgment;awareness of need for assistance;sequencing abilities  -LM     Impairments Affecting Function (Mobility) balance;cognition;endurance/activity tolerance;shortness of breath;strength  -LM     Cognitive Impairments, Mobility  Safety/Performance awareness, need for assistance;insight into deficits/self-awareness;judgment;problem-solving/reasoning;safety precaution awareness;safety precaution follow-through;sequencing abilities  -LM               User Key  (r) = Recorded By, (t) = Taken By, (c) = Cosigned By      Initials Name Provider Type    Jeaneth Cisse PT Physical Therapist                   Mobility       Row Name 05/06/25 1031          Bed Mobility    Bed Mobility supine-sit  -LM     Supine-Sit [place] (Bed Mobility) verbal cues;standby assist  -LM     Assistive Device (Bed Mobility) head of bed elevated;bed rails  -LM       Row Name 05/06/25 1031          Sit-Stand Transfer    Sit-Stand [place] (Transfers) verbal cues;nonverbal cues (demo/gesture);contact guard  -LM     Assistive Device (Sit-Stand Transfers) walker, front-wheeled  -LM       Row Name 05/06/25 1031          Gait/Stairs (Locomotion)    [place] Level (Gait) verbal cues;nonverbal cues (demo/gesture);contact guard;maximum assist (25% patient effort)  -LM     Assistive Device (Gait) walker, front-wheeled  -LM     Patient was able to Ambulate yes  -LM     Distance in Feet (Gait) 40  20'x2 with RW and one sitting rest break; fall risk d/t uncontrolled patient anxiety during ambulation.  -LM     Deviations/Abnormal Patterns (Gait) base of support, wide;festinating/shuffling;gait speed decreased;other (see comments)  uncontrolled jerking d/t anxiety with ambulation  -LM               User Key  (r) = Recorded By, (t) = Taken By, (c) = Cosigned By      Initials Name Provider Type    Jeaneth Cisse PT Physical Therapist                   Obj/Interventions       Row Name 05/06/25 1031          Balance    Balance Assessment sitting static balance;sitting dynamic balance;standing static balance;standing dynamic balance  -LM     Static Sitting Balance standby assist  -LM     Dynamic Sitting Balance standby assist  -LM     Position, Sitting Balance  unsupported;sitting edge of bed  -LM     Static Standing Balance contact guard;verbal cues  -LM     Dynamic Standing Balance supervision;contact guard  -LM     Position/Device Used, Standing Balance supported;walker, rolling  -LM               User Key  (r) = Recorded By, (t) = Taken By, (c) = Cosigned By      Initials Name Provider Type    LM Jeaneth Modi, PT Physical Therapist                   Goals/Plan    No documentation.                  Clinical Impression       Row Name 05/06/25 1031          Pain    Pretreatment Pain Rating 7/10  -LM     Posttreatment Pain Rating 7/10  -LM     Pain Location other (see comments)  generalized  -LM     Pain Side/Orientation generalized  -LM     Pain Management Interventions exercise or physical activity utilized  -LM     Response to Pain Interventions activity participation with tolerable pain  -LM       Row Name 05/06/25 1031          Plan of Care Review    Plan of Care Reviewed With patient  -LM     Progress improving  -LM     Outcome Evaluation Patient received lying in bed on 4 LPM O2. NAD but with c/o 7/10 generalized pain. Cooperative to work with PT. Daughter present at bedside. She requires verbal cues and SBA to transition sup to sit. Good sitting balance. Reports dizziness with change of position. BP as follows: 107/68 supine, 118/81 sitting. Dizziness clears with a short rest break. Sit to standfrom EOB with verbal cues and CGA. Impulsive behavior despite cues. Able to ambulate 20'x2 with RW and assist varying between CGA-max A d/t patient wth uncontrolled jerking and increased fall risk d/t anxiety. Required a sitting rest break to complete ambulation. Patient left sitting up in recliner chair on 4 LPM O2. Cont PT per POC. Discussed with patient that PT feels she would benefit from inpatient rehab but patient and her daughter decline placement.  -LM       Row Name 05/06/25 1031          Therapy Assessment/Plan (PT)    Therapy Frequency (PT) 5 times/wk  -LM        Row Name 05/06/25 1031          Vital Signs    Pre Systolic BP Rehab 107  -LM     Pre Treatment Diastolic BP 68  -LM     Intra Systolic BP Rehab 118  -LM     Intra Treatment Diastolic BP 81  -LM     O2 Delivery Pre Treatment supplemental O2  -LM     O2 Delivery Intra Treatment supplemental O2  -LM     O2 Delivery Post Treatment supplemental O2  -LM       Row Name 05/06/25 1031          Positioning and Restraints    Pre-Treatment Position in bed  -LM     Post Treatment Position chair  -LM     In Chair reclined;call light within reach;encouraged to call for assist;exit alarm on  -LM               User Key  (r) = Recorded By, (t) = Taken By, (c) = Cosigned By      Initials Name Provider Type    Jeaneth Cisse, HANS Physical Therapist                   Outcome Measures       Row Name 05/06/25 1031 05/06/25 0852       How much help from another person do you currently need...    Turning from your back to your side while in flat bed without using bedrails? 4  -LM 4  -MH    Moving from lying on back to sitting on the side of a flat bed without bedrails? 4  -LM 3  -MH    Moving to and from a bed to a chair (including a wheelchair)? 3  -LM 3  -MH    Standing up from a chair using your arms (e.g., wheelchair, bedside chair)? 3  -LM 3  -MH    Climbing 3-5 steps with a railing? 2  -LM 2  -MH    To walk in hospital room? 3  -LM 3  -MH    AM-PAC 6 Clicks Score (PT) 19  -LM 18  -MH    Highest Level of Mobility Goal 6 --> Walk 10 steps or more  -LM 6 --> Walk 10 steps or more  -MH      Row Name 05/06/25 1254 05/06/25 1031       Functional Assessment    Outcome Measure Options AM-PAC 6 Clicks Daily Activity (OT)  -SD AM-PAC 6 Clicks Basic Mobility (PT)  -LM              User Key  (r) = Recorded By, (t) = Taken By, (c) = Cosigned By      Initials Name Provider Type    Jeaneth Cisse, PT Physical Therapist    Aimee Bolanos, OT Occupational Therapist    Susan Linton, RN Registered Nurse                                  Physical Therapy Education       Title: PT OT SLP Therapies (Resolved)       Topic: Physical Therapy (Resolved)       Point: Mobility training (Resolved)       Learning Progress Summary            Patient Acceptance, E, VU,NR by TW at 5/4/2025 1047    Comment: Pt and her daughter educated on purpose of PT evaluation and inpt POC as well as pt being educated on waiting for assist for mobility. Pt will need reinforcement of fall precautions.   Family Acceptance, E, VU,NR by TW at 5/4/2025 1047    Comment: Pt and her daughter educated on purpose of PT evaluation and inpt POC as well as pt being educated on waiting for assist for mobility. Pt will need reinforcement of fall precautions.                      Point: Home exercise program (Resolved)       Learner Progress:  Not documented in this visit.              Point: Body mechanics (Resolved)       Learner Progress:  Not documented in this visit.              Point: Precautions (Resolved)       Learning Progress Summary            Patient Acceptance, E, VU,NR by TW at 5/4/2025 1047    Comment: Pt and her daughter educated on purpose of PT evaluation and inpt POC as well as pt being educated on waiting for assist for mobility. Pt will need reinforcement of fall precautions.   Family Acceptance, E, VU,NR by TW at 5/4/2025 1047    Comment: Pt and her daughter educated on purpose of PT evaluation and inpt POC as well as pt being educated on waiting for assist for mobility. Pt will need reinforcement of fall precautions.                                      User Key       Initials Effective Dates Name Provider Type Discipline    TW 06/16/21 -  Alice Laura PT Physical Therapist PT                  PT Recommendation and Plan     Progress: improving  Outcome Evaluation: Patient received lying in bed on 4 LPM O2. NAD but with c/o 7/10 generalized pain. Cooperative to work with PT. Daughter present at bedside. She requires verbal cues and SBA to transition sup to sit.  Good sitting balance. Reports dizziness with change of position. BP as follows: 107/68 supine, 118/81 sitting. Dizziness clears with a short rest break. Sit to standfrom EOB with verbal cues and CGA. Impulsive behavior despite cues. Able to ambulate 20'x2 with RW and assist varying between CGA-max A d/t patient wth uncontrolled jerking and increased fall risk d/t anxiety. Required a sitting rest break to complete ambulation. Patient left sitting up in recliner chair on 4 LPM O2. Cont PT per POC. Discussed with patient that PT feels she would benefit from inpatient rehab but patient and her daughter decline placement.     Time Calculation:          Therapy Charges for Today       Code Description Service Date Service Provider Modifiers Qty    [id number] HC GAIT TRAINING EA 15 MIN 5/6/2025 Jeaneth Modi, PT GP 1    [id number]  PT THERAPEUTIC ACT EA 15 MIN 5/6/2025 Jeaneth Modi, PT GP 1            PT G-Codes  Outcome Measure Options: AM-PAC 6 Clicks Daily Activity (OT)  AM-PAC 6 Clicks Score (PT): 19  AM-PAC 6 Clicks Score (OT): 19  PT Discharge Summary  Anticipated Discharge Disposition (PT): inpatient rehabilitation facility (Patient and daughter declines.)    Jeaneth Modi, PT  5/6/2025

## 2025-05-06 NOTE — TELEPHONE ENCOUNTER
Jerilyn almaraz called to schedule hospital followup this week for patient discharging today for copd. The soonest opening was 5/15/25 however they state patient needs to be seen sooner if at all possible. Please advise.

## 2025-05-06 NOTE — DISCHARGE INSTRUCTIONS
Patient to be discharged home with home health.  Continue prednisone/Lasix as needed.  Toprol dose increased to 50 mg.  Continue amiodarone.  Do not take valsartan as it lowers blood pressure to low, unable to tolerate.  If Lasix taken blood pressure will also decrease.  Monitor closely.  Trazodone dose, decreased as can cause worsening difficulty breathing and confusion.  Wear BiPAP as often as possible throughout the day as needed and at night.  Follow with PCP in 2 to 3 days.  Follow with COPD clinic and heart failure clinic.  Return to emergency department for any worsening symptoms.

## 2025-05-06 NOTE — THERAPY DISCHARGE NOTE
Acute Care - Occupational Therapy Discharge  [facility]    Patient Name: Gabi Dudley  : 1945    MRN: [id number]                              Today's Date: 2025       Admit Date: 5/3/2025    Visit Dx:     ICD-10-CM ICD-9-CM   1. Acute on chronic systolic congestive heart failure  I50.23 428.23     428.0   2. COPD exacerbation  J44.1 491.21   3. Chronic obstructive pulmonary disease with acute lower respiratory infection  J44.0 496     519.8     Patient Active Problem List   Diagnosis    Adrenal adenoma    Anxiety    Chronic fatigue    Fibromyalgia    Hyperlipidemia    Essential hypertension    Insomnia    Osteoarthritis of knee    Osteoporosis    Pulmonary emphysema    Simple renal cyst    Tension headache    Vitamin D deficiency    Diverticulosis    Inversion of nipple    Paroxysmal atrial fibrillation    Coronary artery disease involving native coronary artery of native heart without angina pectoris    Autonomic dysfunction    Gastroesophageal reflux disease without esophagitis    Urge incontinence    Erythrasma    Compression fracture of T5 vertebra    Lung nodule    COPD (chronic obstructive pulmonary disease)    Overweight (BMI 25.0-29.9)    Acute on chronic respiratory failure with hypoxia    Acute on chronic respiratory failure with hypoxia and hypercapnia    Iron deficiency anemia    Impaired mobility and ADLs    Acute respiratory failure with hypoxia and hypercapnia    Aspiration pneumonia of right lower lobe    Partial small bowel obstruction    Moderate malnutrition    Bowel obstruction    Enteritis    Diverticulitis    Elevated troponin    Chronic combined systolic and diastolic CHF (congestive heart failure)    NSTEMI (non-ST elevated myocardial infarction)    COPD exacerbation     Past Medical History:   Diagnosis Date    Adrenal adenoma     Anemia     Arrhythmia     Asthma     Atrial fibrillation     Back pain     Benign colonic polyp     Benign tumor of adrenal gland     Cataract      bilateral    Cholelithiasis     Chronic bronchitis     Chronic bronchitis with COPD (chronic obstructive pulmonary disease)     COPD (chronic obstructive pulmonary disease) 2005    Coronary artery disease     Depression     Diverticulosis Years ago    Elevated cholesterol     Environmental and seasonal allergies     Fibromyalgia     Fibromyalgia, primary Sometime in the 90s    Gastritis     Generalized anxiety disorder     GERD (gastroesophageal reflux disease)     H/O mammogram     Headache Years ago    Hemorrhoids     History of blood transfusion 1985    History of blood transfusion 1985    History of echocardiogram     History of endometriosis     History of nuclear stress test     Hospitalization or health care facility admission within last 6 months     5 times between 11/2022-05/2023    Hypertension     IBS (irritable bowel syndrome)     Impaired functional mobility, balance, gait, and endurance     Impaired mobility     Inverted nipple     Kidney disease     Kidney stone     Liver cyst     Low back pain Years ago    Nodular radiologic density     Nodule of left lung     NSTEMI (non-ST elevated myocardial infarction) 9/24/2024    On home oxygen therapy     2 liters NC QHS    Osteoarthritis     Osteopenia Several years ago    Osteoporosis     PONV (postoperative nausea and vomiting)     Renal cyst     Sinus problem     2014    Sinusitis     Skin cancer     basal cell carcinoma    SOB (shortness of breath)     Tobacco use     Urinary frequency     Urinary tract infection Years ago    Frequent UTIs    Vitamin D deficiency     Wears glasses     Wears partial dentures     upper plate     Past Surgical History:   Procedure Laterality Date    APPENDECTOMY  1980s    CARDIAC CATHETERIZATION  2003    CATARACT EXTRACTION  2013    both eyes    CHOLECYSTECTOMY  2020    CHOLECYSTECTOMY WITH INTRAOPERATIVE CHOLANGIOGRAM N/A 10/30/2020    Procedure: CHOLECYSTECTOMY LAPAROSCOPIC INTRAOPERATIVE CHOLANGIOGRAPHY;  Surgeon: Aurelia  Sima WEISS MD;  Location: [facility] OR;  Service: General;  Laterality: N/A;    COLON SURGERY  2020    COLONOSCOPY  03/11/2013    COLONOSCOPY  06/21/2016    COLONOSCOPY N/A 07/24/2019    Procedure: COLONOSCOPY W/ COLD FORCEP POLYPECTOMIES; HOT SNARE POLYPECTOMIES; COLD SNARE POLYPECTOMY;  Surgeon: Goyo Nunez MD;  Location: [facility] ENDOSCOPY;  Service: Gastroenterology    COLONOSCOPY W/ BIOPSIES AND POLYPECTOMY      EXPLORATORY LAPAROTOMY N/A 11/23/2020    Procedure: colectomy, right, closure of enterotomy x 2, reduction of internal volvulus;  Surgeon: Sima Moses MD;  Location: [facility] OR;  Service: General;  Laterality: N/A;    EXPLORATORY LAPAROTOMY N/A 8/9/2023    Procedure: LAPAROTOMY EXPLORATORY WITH LYIS OF ADHESIONS AND  CENTRAL LINE INSERTION;  Surgeon: Bianca Isaac DO;  Location: [facility] OR;  Service: General;  Laterality: N/A;    HYSTERECTOMY  1980s    partial    LYSIS OF ABDOMINAL ADHESIONS      TONSILLECTOMY  1987    UPPER GASTROINTESTINAL ENDOSCOPY  01/13/2015    VAGINAL DELIVERY      x2      General Information       Row Name 05/06/25 1236          OT Time and Intention    Subjective Information complains of;pain  -SD     Document Type discharge evaluation/summary  -SD     Mode of Treatment occupational therapy  -SD     Patient Effort good  -SD     Symptoms Noted During/After Treatment fatigue  -SD       Row Name 05/06/25 1236          General Information    Patient Profile Reviewed yes  -SD     Prior Level of Function independent:;all household mobility;min assist:;ADL's  -SD     Existing Precautions/Restrictions fall;oxygen therapy device and L/min  -SD     Barriers to Rehab medically complex;previous functional deficit  -SD       Row Name 05/06/25 1236          Living Environment    Current Living Arrangements home  -SD     People in Home child(ashely), adult  -SD       Row Name 05/06/25 1236          Home Main Entrance    Number of Stairs, Main Entrance none  -SD       Row Name 05/06/25 1236           Stairs Within Home, Primary    Number of Stairs, Within Home, Primary none  -SD       Row Name 05/06/25 1236          Cognition    Orientation Status (Cognition) oriented x 4  -SD       Row Name 05/06/25 1236          Safety Issues/Impairments Affecting Functional Mobility    Safety Issues Affecting Function (Mobility) safety precautions follow-through/compliance;safety precaution awareness  -SD     Impairments Affecting Function (Mobility) balance;endurance/activity tolerance;strength;shortness of breath;pain  -SD               User Key  (r) = Recorded By, (t) = Taken By, (c) = Cosigned By      Initials Name Provider Type    SD Aimee Allan OT Occupational Therapist                   Mobility/ADL's       Row Name 05/06/25 1238          Bed Mobility    Bed Mobility supine-sit  -SD     Supine-Sit [place] (Bed Mobility) standby assist  -SD     Assistive Device (Bed Mobility) bed rails;head of bed elevated  -SD       [facility] Name 05/06/25 1238          Transfers    Transfers sit-stand transfer  -SD       [facility] Name 05/06/25 1238          Sit-Stand Transfer    Sit-Stand [place] (Transfers) contact guard  -SD     Assistive Device (Sit-Stand Transfers) walker, front-wheeled  -SD       [facility] Name 05/06/25 1238          Functional Mobility    Functional Mobility- Ind. Level contact guard assist  -SD     Functional Mobility- Device walker, front-wheeled  -SD     Functional Mobility-Distance (Feet) 20  x2  -SD     Functional Mobility- Comment CGA declining to max A d/t increased anxiety, shaking and knees buckling. Requiring seated rest  -SD       Row Name 05/06/25 1238          Activities of Daily Living    BADL Assessment/Intervention bathing;upper body dressing;lower body dressing;grooming;feeding;toileting  -SD       Row Name 05/06/25 1238          Bathing Assessment/Intervention    [place] Level (Bathing) minimum assist (75% patient effort)  -SD       [facility] Name 05/06/25 1238          Upper Body  Dressing Assessment/Training    [place] Level (Upper Body Dressing) set up  -SD       [facility] Name 05/06/25 1238          Lower Body Dressing Assessment/Training    [place] Level (Lower Body Dressing) contact guard assist;minimum assist (75% patient effort)  -SD       [facility] Name 05/06/25 1238          Grooming Assessment/Training    [place] Level (Grooming) set up  -SD       Row Name 05/06/25 1238          Self-Feeding Assessment/Training    [place] Level (Feeding) set up  -SD       Row Name 05/06/25 1238          Toileting Assessment/Training    [place] Level (Toileting) minimum assist (75% patient effort)  -SD     Assistive Devices (Toileting) commode, bedside without drop arms  -SD               User Key  (r) = Recorded By, (t) = Taken By, (c) = Cosigned By      Initials Name Provider Type    Aimee Bolanos OT Occupational Therapist                   Obj/Interventions       Row Name 05/06/25 1244          Range of Motion Comprehensive    General Range of Motion bilateral upper extremity ROM WFL  -SD       Row Name 05/06/25 1244          Strength Comprehensive (MMT)    General Manual Muscle Testing (MMT) Assessment upper extremity strength deficits identified  -SD     Comment, General Manual Muscle Testing (MMT) Assessment UB 3+/5 - 4-/5  -SD               User Key  (r) = Recorded By, (t) = Taken By, (c) = Cosigned By      Initials Name Provider Type    Aimee Bolanos OT Occupational Therapist                   Goals/Plan    No documentation.                  Clinical Impression       Row Name 05/06/25 1244          Pain Assessment    Pretreatment Pain Rating 7/10  -SD     Posttreatment Pain Rating 6/10  -SD     Pain Side/Orientation generalized  -SD     Pain Management Interventions exercise or physical activity utilized  -SD     Response to Pain Interventions activity participation with decreased pain  -SD       [facility] Name 05/06/25 1244          Plan of Care Review    Plan of Care  Reviewed With patient;daughter  -SD     Progress no change  -SD     Outcome Evaluation OT eval completed. Patient is supine in bed, daughter is present at bedside. Patient is Ox4, reports 7/10 generalized pain. Patient lives with her daughter, doesn't have to go up/down stairs. Patient walks short distances using a rollator, her daughter assists with ADLs as needed, but she works during the day. Patient has a walk in shower with shower chair and a BSC. Patient on 4L O2 satting 95%, wears 4L at baseline. Patient performed supine to sit SBA, able to ofe socks sitting EOB SBA. Performed sit to stand CGA, walked 20' using RW CGA, during turn patient became anxious, began shaking, knees buckling; required max A to prevent from falling until nurse could get the chair. Patient walked 20' back to chair. Discussed benefit of rehab with patient as her daughter works during the day as well as reports of increased falls. Patient states she prefers [facility] or the [facility], but then denies going to rehab when case management goes to discuss options with her. Patient to return home with daughter and  services.  -SD       Row Name 05/06/25 1244          Therapy Assessment/Plan (OT)    Patient/Family Therapy Goal Statement (OT) home  -SD     Rehab Potential (OT) fair  -SD     Criteria for Skilled Therapeutic Interventions Met (OT) skilled treatment is necessary  -SD     Therapy Frequency (OT) evaluation only  -SD       Row Name 05/06/25 1244          Therapy Plan Review/Discharge Plan (OT)    Anticipated Discharge Disposition (OT) inpatient rehabilitation facility  -SD       Row Name 05/06/25 1244          Vital Signs    O2 Delivery Pre Treatment supplemental O2  -SD     O2 Delivery Intra Treatment supplemental O2  -SD     O2 Delivery Post Treatment supplemental O2  -SD       Row Name 05/06/25 1244          Positioning and Restraints    Pre-Treatment Position in bed  -SD     Post Treatment Position chair  -SD     In Chair  reclined;call light within reach;encouraged to call for assist  -SD               User Key  (r) = Recorded By, (t) = Taken By, (c) = Cosigned By      Initials Name Provider Type    Aimee Bolanos OT Occupational Therapist                   Outcome Measures       Row Name 05/06/25 1254          How much help from another is currently needed...    Putting on and taking off regular lower body clothing? 3  -SD     Bathing (including washing, rinsing, and drying) 3  -SD     Toileting (which includes using toilet bed pan or urinal) 3  -SD     Putting on and taking off regular upper body clothing 3  -SD     Taking care of personal grooming (such as brushing teeth) 3  -SD     Eating meals 4  -SD     AM-PAC 6 Clicks Score (OT) 19  -SD       Row Name 05/06/25 0852          How much help from another person do you currently need...    Turning from your back to your side while in flat bed without using bedrails? 4  -MH     Moving from lying on back to sitting on the side of a flat bed without bedrails? 3  -MH     Moving to and from a bed to a chair (including a wheelchair)? 3  -MH     Standing up from a chair using your arms (e.g., wheelchair, bedside chair)? 3  -MH     Climbing 3-5 steps with a railing? 2  -MH     To walk in hospital room? 3  -MH     AM-PAC 6 Clicks Score (PT) 18  -MH     Highest Level of Mobility Goal 6 --> Walk 10 steps or more  -       Row Name 05/06/25 1254          Functional Assessment    Outcome Measure Options AM-PAC 6 Clicks Daily Activity (OT)  -SD               User Key  (r) = Recorded By, (t) = Taken By, (c) = Cosigned By      Initials Name Provider Type    Aimee Bolanos OT Occupational Therapist    Susan Linton, RN Registered Nurse                    OT Recommendation and Plan  Therapy Frequency (OT): evaluation only  Plan of Care Review  Plan of Care Reviewed With: patient, daughter  Progress: no change  Outcome Evaluation: OT eval completed. Patient is supine in bed,  daughter is present at bedside. Patient is Ox4, reports 7/10 generalized pain. Patient lives with her daughter, doesn't have to go up/down stairs. Patient walks short distances using a rollator, her daughter assists with ADLs as needed, but she works during the day. Patient has a walk in shower with shower chair and a BSC. Patient on 4L O2 satting 95%, wears 4L at baseline. Patient performed supine to sit SBA, able to ofe socks sitting EOB SBA. Performed sit to stand CGA, walked 20' using RW CGA, during turn patient became anxious, began shaking, knees buckling; required max A to prevent from falling until nurse could get the chair. Patient walked 20' back to chair. Discussed benefit of rehab with patient as her daughter works during the day as well as reports of increased falls. Patient states she prefers [facility] or the [facility], but then denies going to rehab when case management goes to discuss options with her. Patient to return home with daughter and  services.  Plan of Care Reviewed With: patient, daughter  Outcome Evaluation: OT eval completed. Patient is supine in bed, daughter is present at bedside. Patient is Ox4, reports 7/10 generalized pain. Patient lives with her daughter, doesn't have to go up/down stairs. Patient walks short distances using a rollator, her daughter assists with ADLs as needed, but she works during the day. Patient has a walk in shower with shower chair and a BSC. Patient on 4L O2 satting 95%, wears 4L at baseline. Patient performed supine to sit SBA, able to ofe socks sitting EOB SBA. Performed sit to stand CGA, walked 20' using RW CGA, during turn patient became anxious, began shaking, knees buckling; required max A to prevent from falling until nurse could get the chair. Patient walked 20' back to chair. Discussed benefit of rehab with patient as her daughter works during the day as well as reports of increased falls. Patient states she prefers [facility] or the  [facility], but then denies going to rehab when case management goes to discuss options with her. Patient to return home with daughter and  services.     Time Calculation:   Evaluation Complexity (OT)  Review Occupational Profile/Medical/Therapy History Complexity: expanded/moderate complexity  Assessment, Occupational Performance/Identification of Deficit Complexity: 3-5 performance deficits  Clinical Decision Making Complexity (OT): detailed assessment/moderate complexity  Overall Complexity of Evaluation (OT): moderate complexity     Time Calculation- OT       Row Name 05/06/25 1254             Time Calculation- OT    OT Start Time 1032  -SD      OT Received On 05/06/25  -SD         Untimed Charges    OT Eval/Re-eval Minutes 45  -SD         Total Minutes    Untimed Charges Total Minutes 45  -SD       Total Minutes 45  -SD                User Key  (r) = Recorded By, (t) = Taken By, (c) = Cosigned By      Initials Name Provider Type    Aimee Bolanos OT Occupational Therapist                  Therapy Charges for Today       Code Description Service Date Service Provider Modifiers Qty    [id number] HC OT EVAL MOD COMPLEXITY 3 5/6/2025 Aimee Allan OT GO 1               OT Discharge Summary  Anticipated Discharge Disposition (OT): inpatient rehabilitation facility    Aimee Allan OT  5/6/2025

## 2025-05-06 NOTE — DISCHARGE SUMMARY
[facility]   DISCHARGE SUMMARY      Name:  Gabi Dudley   Age:  80 y.o.  Sex:  female  :  1945  MRN:  [id number]   Visit Number:  [id number]    Admission Date:  5/3/2025  Date of Discharge:  2025  Primary Care Physician:  Krystina Saunders, DO    Important issues to note:    -Patient admitted with acute on chronic hypoxic/hypercapnic respiratory failure, does have BiPAP at home for which patient is noncompliant with.  - A-fib with RVR also likely contributing to patient dyspnea.  Metoprolol increased during admission.  Attempted to add diltiazem with blood pressure not tolerating.  She was diuresed with IV Lasix which did seem to improve.  She will continue increased dose of metoprolol and monitor blood pressure closely.  Lasix as tolerated/needed.  - Continue prednisone 40 mg daily x 5 days.  - COPD/heart failure clinic.  - PCP follow-up.  - Home health ordered.  - Advised decrease trazodone dose/sedating medications as likely contributing to hypercapnia as well.    Discharge Diagnoses:     Acute on chronic hypoxic hypercapnic respiratory failure  Acute exacerbation of COPD  Acute exacerbation of systolic congestive heart failure  Atrial fibrillation on Eliquis  Hypertension  Hyperlipidemia  Tobacco use disorder  Osteoporosis  Osteoarthritis  Fibromyalgia  Anxiety  Depression  Vitamin D deficiency    Problem List:     Active Hospital Problems    Diagnosis  POA    **COPD exacerbation [J44.1]  Yes    COPD (chronic obstructive pulmonary disease) [J44.9]  Yes      Resolved Hospital Problems   No resolved problems to display.     Presenting Problem:    Chief Complaint   Patient presents with    Shortness of Breath      Consults:     Consulting Physician(s)         Provider   Role Specialty     Herbert Rios MD      Consulting Physician Cardiology          Procedures Performed:        History of presenting illness/Hospital Course:    Ms. Reed is a pleasant 80-year-old  female with pertinent past medical history of chronic respiratory failure with COPD on 4 L nasal cannula, hypertension, paroxysmal atrial fibrillation on Eliquis, chronic systolic heart failure, depression and anxiety, anemia who presented to emergency department due to worsening shortness of air, onset 2 weeks duration, recently initiated on antibiotics with azithromycin and Augmentin with overall no improvement.  Patient currently lives with her daughter.  Denied fever or swelling.     Upon ED presentation patient noted to require 6 L of oxygen, otherwise hemodynamically stable.  Pertinent labs and imaging noted proBNP 2000, respiratory panel negative, pCO2 85.4 with pH 7.3 and PO2 of 74, hemoglobin 9.5, chest x-ray worsening interstitial opacities favor pulmonary edema.  Hospitalist consulted for further medical management.  Initiated on Zosyn/IV Solu-Medrol/Lasix.  Cardiology consulted as patient A-fib with RVR, added diltiazem, suggested cardioversion if does not convert to sinus rhythm.  Thankfully patient A-arina more controlled.  She did not tolerate diltiazem due to hypotension.  Otherwise reassuring labs and vitals noted.  She will continue prednisone/Lasix as needed/decrease sedating medications as likely contributing to overall hypercapnia.  Wear BiPAP at home as needed and at night.  She will follow closely with COPD/heart failure clinic.  Did discuss CODE STATUS during admission with patient agreeing to DNR/DNI.  Advised high risk for readmission given overall debility.  She is agreeable for home health at this time.    Vital Signs:    Temp:  [97.3 °F (36.3 °C)-98.1 °F (36.7 °C)] 98.1 °F (36.7 °C)  Heart Rate:  [65-72] 65  Resp:  [12-18] 16  BP: (102-140)/(57-66) 109/57    Physical Exam:    General Appearance:  Alert and cooperative.  Chronically ill/frail appearing middle-aged female.   Head:  Atraumatic and normocephalic.   Eyes: Conjunctivae and sclerae normal, no icterus. No pallor.   Ears:  Ears  with no abnormalities noted.   Throat: No oral lesions, no thrush, oral mucosa moist.   Neck: Supple, trachea midline, no thyromegaly.   Back:   No kyphoscoliosis present. No tenderness to palpation.   Lungs:   Breath sounds heard bilaterally equally.  On 4 L nasal cannula unlabored.   Heart:  Normal S1 and S2, regularly irregular, no murmur, no gallop, no rub. No JVD.   Abdomen:   Normal bowel sounds, no masses, no organomegaly. Soft, nontender, nondistended, no rebound tenderness.   Extremities: Supple, no edema, no cyanosis, no clubbing.   Pulses: Pulses palpable bilaterally.   Skin: No bleeding or rash.   Neurologic: Alert and oriented x 3. No facial asymmetry. Moves all four limbs.  Body tremor.  Severe generalized weakness.     Pertinent Lab Results:     Results from last 7 days   Lab Units 05/06/25  0540 05/05/25  0537 05/04/25  0625 05/03/25  1406   SODIUM mmol/L 141 145 143 143   POTASSIUM mmol/L 4.2 4.3 4.3 4.3   CHLORIDE mmol/L 96* 97* 95* 96*   CO2 mmol/L 43.6* 46.8* 43.7* 45.8*   BUN mg/dL 34* 34* 23 17   CREATININE mg/dL 0.59 0.65 0.65 0.57   CALCIUM mg/dL 8.9 8.9 9.1 9.3   BILIRUBIN mg/dL  --  0.2  --  0.2   ALK PHOS U/L  --  73  --  96   ALT (SGPT) U/L  --  28  --  19   AST (SGOT) U/L  --  31  --  29   GLUCOSE mg/dL 119* 108* 135* 127*     Results from last 7 days   Lab Units 05/06/25  0540 05/05/25  0537 05/04/25  0625   WBC 10*3/mm3 8.80 9.47 5.37   HEMOGLOBIN g/dL 9.2* 8.9* 9.5*   HEMATOCRIT % 31.6* 31.6* 32.8*   PLATELETS 10*3/mm3 196 197 160         Results from last 7 days   Lab Units 05/03/25  1516 05/03/25  1406   HSTROP T ng/L 13 13     Results from last 7 days   Lab Units 05/03/25  1406   PROBNP pg/mL 2,052.0*             Results from last 7 days   Lab Units 05/03/25  1613   PH, ARTERIAL pH units 7.350   PO2 ART mm Hg 74.2*   PCO2, ARTERIAL mm Hg 85.4*   HCO3 ART mmol/L 47.1*           Pertinent Radiology Results:    Imaging Results (All)       Procedure Component Value Units Date/Time     XR Chest 1 View [[id number]] Collected: 05/03/25 1435     Updated: 05/03/25 1438    Narrative:      PROCEDURE: XR CHEST 1 VW-     INDICATION:  SOA Triage Protocol     FINDINGS:  A portable view of the chest was obtained.  Comparison is  made to a prior exam dated 4/9/2025.   There is stable mild  cardiomegaly. There is been slight worsening of bilateral interstitial  opacities. This may represent pulmonary edema. No pleural effusion or  pneumothorax.       Impression:      Worsening interstitial opacities. Favor pulmonary edema.        This report was signed and finalized on 5/3/2025 2:36 PM by Loan Carbone MD.               Echo:    Results for orders placed during the hospital encounter of 05/03/25    Adult Transthoracic Echo Complete W/ Cont if Necessary Per Protocol    Interpretation Summary    Left ventricular systolic function is normal. Calculated left ventricular EF = 57.3%    Left ventricular wall thickness is consistent with mild septal asymmetric hypertrophy.    Left ventricular diastolic function was indeterminate.    Moderately reduced right ventricular systolic function noted.    Left atrial volume is mildly increased.    Mild aortic valve stenosis is present.    Estimated right ventricular systolic pressure from tricuspid regurgitation is normal (<35 mmHg).    Saline test results are negative for intracardiac shunting.    Condition on Discharge:      Stable.    Code status during the hospital stay:    Code Status and Medical Interventions: No CPR (Do Not Attempt to Resuscitate); Limited Support; No intubation (DNI); daughter at bedside   Ordered at: 05/06/25 1225     Code Status (Patient has no pulse and is not breathing):    No CPR (Do Not Attempt to Resuscitate)     Medical Interventions (Patient has pulse or is breathing):    Limited Support     Medical Intervention Limits:    No intubation (DNI)     Comments:    daughter at bedside     Level Of Support Discussed With:    Patient     Discharge  Disposition:    [place] [facility] [place]    Discharge Medications:       Discharge Medications        New Medications        Instructions Start Date   predniSONE 20 MG tablet  Commonly known as: DELTASONE   40 mg, Oral, Daily With Breakfast   Start Date: May 7, 2025            Changes to Medications        Instructions Start Date   furosemide 20 MG tablet  Commonly known as: LASIX  What changed:   when to take this  reasons to take this   20 mg, Oral, Daily PRN      metoprolol succinate XL 50 MG 24 hr tablet  Commonly known as: TOPROL-XL  What changed:   medication strength  how much to take   50 mg, Oral, Every 24 Hours Scheduled   Start Date: May 7, 2025     traZODone 50 MG tablet  Commonly known as: DESYREL  What changed:   medication strength  See the new instructions.   25 mg, Oral, Nightly             Continue These Medications        Instructions Start Date   albuterol (2.5 MG/3ML) 0.083% nebulizer solution  Commonly known as: PROVENTIL   2.5 mg, Nebulization, Every 4 Hours PRN      albuterol sulfate  (90 Base) MCG/ACT inhaler  Commonly known as: PROVENTIL HFA;VENTOLIN HFA;PROAIR HFA   2 puffs, Inhalation, Every 4 Hours PRN      amiodarone 200 MG tablet  Commonly known as: PACERONE   200 mg, Oral, Every 24 Hours Scheduled      apixaban 5 MG tablet tablet  Commonly known as: Eliquis   5 mg, Oral, Every 12 Hours Scheduled      atorvastatin 40 MG tablet  Commonly known as: Lipitor   40 mg, Oral, Daily      benzonatate 100 MG capsule  Commonly known as: TESSALON   100 mg, Oral, 3 Times Daily PRN      Breztri Aerosphere 160-9-4.8 MCG/ACT aerosol inhaler  Generic drug: Budeson-Glycopyrrol-Formoterol   2 puffs, Inhalation, 2 Times Daily, Rinse mouth out after use      clotrimazole-betamethasone 1-0.05 % cream  Commonly known as: Lotrisone   1 Application, Topical, 2 Times Daily      diazePAM 5 MG tablet  Commonly known as: Valium   5 mg, Oral, Every 8 Hours PRN      Diclofenac Sodium 1 % gel gel  Commonly known  as: VOLTAREN   4 g, Topical, 4 Times Daily PRN      DULoxetine 60 MG capsule  Commonly known as: CYMBALTA   60 mg, Oral, 2 Times Daily      fluconazole 100 MG tablet  Commonly known as: Diflucan   100 mg, Oral, Daily      fluticasone 50 MCG/ACT nasal spray  Commonly known as: FLONASE   2 sprays, Nasal, Daily      ipratropium-albuterol 0.5-2.5 mg/3 ml nebulizer  Commonly known as: DUO-NEB   USE 3 mL VIA NEBULIZER EVERY 4 HOURS AS NEEDED FOR WHEEZING      meclizine 25 MG tablet  Commonly known as: ANTIVERT   25 mg, Oral, 3 Times Daily PRN      naloxone 4 MG/0.1ML nasal spray  Commonly known as: NARCAN   4 mg      O2  Commonly known as: OXYGEN   2 L/min, As Needed      ondansetron ODT 8 MG disintegrating tablet  Commonly known as: ZOFRAN-ODT   8 mg, Translingual, Every 8 Hours PRN      oxyCODONE-acetaminophen 7.5-325 MG per tablet  Commonly known as: PERCOCET   1 tablet, Oral, Every 4 Hours PRN      pantoprazole 40 MG EC tablet  Commonly known as: PROTONIX   40 mg, Oral, Daily      prochlorperazine 5 MG tablet  Commonly known as: COMPAZINE   5 mg, Oral, Every 6 Hours PRN      sertraline 100 MG tablet  Commonly known as: ZOLOFT   TAKE 1 & 1/2 TABLETS BY MOUTH DAILY      sodium chloride 0.65 % nasal spray   2 sprays, Nasal, As Needed      saline gel nasal gel   1 Application, Topical, As Needed             Stop These Medications      amoxicillin-clavulanate 875-125 MG per tablet  Commonly known as: AUGMENTIN     levocetirizine 5 MG tablet  Commonly known as: XYZAL     methocarbamol 500 MG tablet  Commonly known as: ROBAXIN     QUEtiapine 25 MG tablet  Commonly known as: SEROquel     valsartan 40 MG tablet  Commonly known as: Diovan            Discharge Diet:     Diet Instructions       Diet: Cardiac Diets; Healthy Heart (2-3 Na+); Regular (IDDSI 7); Thin (IDDSI 0)      Discharge Diet: Cardiac Diets    Cardiac Diet: Healthy Heart (2-3 Na+)    Texture: Regular (IDDSI 7)    Fluid Consistency: Thin (IDDSI 0)           Activity at Discharge:     Activity Instructions       Activity as Tolerated            Follow-up Appointments:    Additional Instructions for the Follow-ups that You Need to Schedule       Ambulatory Referral to Disease State Management   As directed      [id number]   What program(s) are you referring for?: COPD Heart Failure   Follow-up needed: Yes        Ambulatory Referral to Home Health   As directed      Face to Face Visit Date: 5/6/2025   Follow-up provider for Plan of Care?: I treated the patient in an acute care facility and will not continue treatment after discharge.   Follow-up provider: KRYSTINA SAUNDERS [[id number]]   Reason/Clinical Findings: debility   Describe mobility limitations that make leaving home difficult: debility   Nursing/Therapeutic Services Requested: Skilled Nursing Physical Therapy Occupational Therapy   Skilled nursing orders: CHF management O2 instruction COPD management               Follow-up Information       Krystina Saunders, DO Follow up in 2 day(s).    Specialty: Family Medicine  Contact information:  [address] [place] [address] [phone]                           Future Appointments   Date Time Provider Department Center   5/15/2025  4:15 PM Krystina Saunders DO [facility]   5/22/2025  3:30 PM Taiwo Curry MD [facility]     Test Results Pending at Discharge:    Pending Results       None                 Britt Nascimento, APRN  05/06/25  12:25 EDT    Time: I spent >30 minutes on this discharge activity which included: face-to-face encounter with the patient, reviewing the data in the system, coordination of the care with the nursing staff as well as consultants, documentation, and entering orders.     Dictated utilizing Dragon dictation.

## 2025-05-06 NOTE — PLAN OF CARE
Goal Outcome Evaluation:  Plan of Care Reviewed With: patient, daughter        Progress: no change  Outcome Evaluation: OT eval completed. Patient is supine in bed, daughter is present at bedside. Patient is Ox4, reports 7/10 generalized pain. Patient lives with her daughter, doesn't have to go up/down stairs. Patient walks short distances using a rollator, her daughter assists with ADLs as needed, but she works during the day. Patient has a walk in shower with shower chair and a BSC. Patient on 4L O2 satting 95%, wears 4L at baseline. Patient performed supine to sit SBA, able to ofe socks sitting EOB SBA. Performed sit to stand CGA, walked 20' using RW CGA, during turn patient became anxious, began shaking, knees buckling; required max A to prevent from falling until nurse could get the chair. Patient walked 20' back to chair. Discussed benefit of rehab with patient as her daughter works during the day as well as reports of increased falls. Patient states she prefers [facility] or the [facility], but then denies going to rehab when case management goes to discuss options with her. Patient to return home with daughter and  services.    Anticipated Discharge Disposition (OT): inpatient rehabilitation facility

## 2025-05-06 NOTE — PLAN OF CARE
Goal Outcome Evaluation:  Plan of Care Reviewed With: patient        Progress: improving  Outcome Evaluation: Patient received lying in bed on 4 LPM O2. NAD but with c/o 7/10 generalized pain. Cooperative to work with PT. Daughter present at bedside. She requires verbal cues and SBA to transition sup to sit. Good sitting balance. Reports dizziness with change of position. BP as follows: 107/68 supine, 118/81 sitting. Dizziness clears with a short rest break. Sit to standfrom EOB with verbal cues and CGA. Impulsive behavior despite cues. Able to ambulate 20'x2 with RW and assist varying between CGA-max A d/t patient wth uncontrolled jerking and increased fall risk d/t anxiety. Required a sitting rest break to complete ambulation. Patient left sitting up in recliner chair on 4 LPM O2. Cont PT per POC. Discussed with patient that PT feels she would benefit from inpatient rehab but patient and her daughter decline placement.    Anticipated Discharge Disposition (PT): inpatient rehabilitation facility (Patient and daughter declines.)

## 2025-05-06 NOTE — PLAN OF CARE
Goal Outcome Evaluation:  Plan of Care Reviewed With: patient        Progress: improving             Patient has been stable this shift with no acute changes. VSS. Plan of care ongoing.

## 2025-05-06 NOTE — CASE MANAGEMENT/SOCIAL WORK
Case Management Discharge Note      Final Note: Pt discharged home with daughter via private car. No DME needs. New order for [facility] will follow up with pt.         Selected Continued Care - Discharged on 5/6/2025 Admission date: 5/3/2025 - Discharge disposition: Home-Health Care c      Destination    No services have been selected for the patient.                Durable Medical Equipment    No services have been selected for the patient.                Dialysis/Infusion    No services have been selected for the patient.                Home Medical Care Coordination complete.      Service Provider Services Address Phone Fax Patient Preferred    [facility] [address], [address] [address] [phone] --              Therapy    No services have been selected for the patient.                Community Resources    No services have been selected for the patient.                Community & DME    No services have been selected for the patient.                    Selected Continued Care - Episodes Includes continued care and service providers with selected services from the active episodes listed below          Transportation Services  Private: Car    Final Discharge Disposition Code: 06 - home with home health care

## 2025-05-07 ENCOUNTER — TRANSITIONAL CARE MANAGEMENT TELEPHONE ENCOUNTER (OUTPATIENT)
Dept: CALL CENTER | Facility: HOSPITAL | Age: 80
End: 2025-05-07
Payer: MEDICARE

## 2025-05-07 NOTE — OUTREACH NOTE
Call Center TCM Note      Flowsheet Row Responses   [facility] facility patient discharged from? [facility]   Does the patient have one of the following disease processes/diagnoses(primary or secondary)? COPD   TCM attempt successful? No   Unsuccessful attempts Attempt 2            Sylwia Olguin Registered Nurse    5/7/2025, 16:12 EDT

## 2025-05-08 ENCOUNTER — TRANSITIONAL CARE MANAGEMENT TELEPHONE ENCOUNTER (OUTPATIENT)
Dept: CALL CENTER | Facility: HOSPITAL | Age: 80
End: 2025-05-08
Payer: MEDICARE

## 2025-05-08 ENCOUNTER — PATIENT OUTREACH (OUTPATIENT)
Dept: CASE MANAGEMENT | Facility: OTHER | Age: 80
End: 2025-05-08
Payer: MEDICARE

## 2025-05-08 DIAGNOSIS — J96.22 ACUTE ON CHRONIC RESPIRATORY FAILURE WITH HYPOXIA AND HYPERCAPNIA: Primary | ICD-10-CM

## 2025-05-08 DIAGNOSIS — J44.1 COPD EXACERBATION: ICD-10-CM

## 2025-05-08 DIAGNOSIS — J96.21 ACUTE ON CHRONIC RESPIRATORY FAILURE WITH HYPOXIA AND HYPERCAPNIA: Primary | ICD-10-CM

## 2025-05-08 NOTE — OUTREACH NOTE
Call Center TCM Note      Flowsheet Row Responses   [facility] facility patient discharged from? [facility]   Does the patient have one of the following disease processes/diagnoses(primary or secondary)? COPD   TCM attempt successful? No   Unsuccessful attempts Attempt 3            Nevin HOLBROOK - Registered Nurse    5/8/2025, 12:48 EDT

## 2025-05-08 NOTE — OUTREACH NOTE
AMBULATORY CASE MANAGEMENT NOTE    Names and Relationships of Patient/Support Persons: Contact: Malu Agiular; Relationship: Emergency Contact -     [facility] Interim Update    ANANT RICE outreach with Patient. Patient had an ED visit with [facility] on 05/03/25. Patient was admitted and discharged to home with primary care follow up as instructed. Medication changes at discharge include the addition of Prednisone 40 mg with changes to Furosemide 20 mg, Metoprolol Succinate XL 50 mg and Trazodone 25 mg and/or discontinuation of amoxicillin, xyzal, robaxin, seroquel and diovan due to sedating effects and hypotension.    AVS, education, labs, discharge medications and recommended f/u reviewed. Patient v/u and compliance with prescribed medication. An extensive amount of time was spent reviewing changes to medications and encouraging use of BiPap. Explained that Patient did not meet criteria for INOGEN POC and that she is at high risk for rehospitalization due to hypercapnia.     Daughter/Farida expressed frustrations with Patient being non adherent to use of BIPAP even when awake for a couple hours a day and her unwillingness to accommodate the flexibility of home health services. No additional questions at this time. Contact information is known to Pt/family. Provided [facility] 24/7 Nurse line via [employer].     Purpose and potential benefits of chronic care management program explained. Patient verbally consented to program participation after voicing understanding of program requirements. Next outreach scheduled.    Care Coordination    Care coordination with ANANT REZA. Referral placed to ANANT REZA for help with home care and ADL available resources and programs.    Adult Patient Profile  Questions/Answers      Flowsheet Row Most Recent Value   Symptoms/Conditions Managed at Home respiratory, cardiovascular   Barriers to Managing Health none   Cardiovascular Symptoms/Conditions coronary artery disease, heart failure,  "dysrhythmia, high blood cholesterol, hypertension   Cardiovascular Management Strategies medication therapy, routine screening   Cardiovascular Self-Management Outcome unsure   Respiratory Symptoms/Conditions sleep disordered breathing, shortness of breath, seasonal allergies, COPD, other (see comments)  [O2 at 6L, non adherence to use of BiPap]   Respiratory Management Strategies BiPAP, breathing techniques, medication therapy, nebulizer therapy, oxygen therapy, routine screening   Oxygen Therapy Device nasal cannula   Oxygen Therapy Times continuous   Oxygen Flow (L/min) 6   BiPAP Settings Managed by pulmonary   Respiratory Self-Management Outcome not well   Respiratory Comment Non adherence to use of BiPap, recent hospitalization for hypercapnia   How to be Addressed \"Gabi\"   How Well Do You Speak English? very well   Source of Information patient, family, health record   Admission in Past 90 Days hospital  [[facility] 5/3-5/6/25]   Current or Previous  Service none   Hearing Difficulty or Deaf yes   Hearing Management hard of hearing   Visual Difficulty or Blind yes   Vision Management vision loss, glasses   Difficulty Concentrating, Remembering or Making Decisions no   Communication Difficulty no   Eating/Swallowing Difficulty no   Walking or Climbing Stairs Difficulty yes   Walking or Climbing Stairs ambulation difficulty, assistance 1 person, stair climbing difficulty, dependent, transferring difficulty, assistance 1 person   Mobility Management DME and SBA as needed   Dressing/Bathing Difficulty yes   Dressing/Bathing bathing difficulty, dependent, dressing difficulty, assistance 1 person   Dressing/Bathing Management Dtr assists with ADL's   Difficulty Managing Errands Independently yes   Errands Management Dtr manages all medications, transportation, finances and follow up appointments   Equipment Currently Used at Home nebulizer, oxygen, power chair,(recliner lift), pulse ox, respiratory supplies, " rollator, scales, shower chair, walker, rolling, wheelchair   Primary Source of Support/Comfort child(ashely)   Name of Support/Comfort Primary Source Dtr/Farida   People in Home child(ashely), adult   Name(s) of People in Home Dtr/Farida   Primary Roles/Responsibilities disabled   Current Living Arrangements home   Resource/Environmental Concerns none          SDOH updated and reviewed with the patient during this program:  --     Alcohol Use: Not At Risk (5/3/2025)    AUDIT-C     Frequency of Alcohol Consumption: Never     Average Number of Drinks: Patient does not drink     Frequency of Binge Drinking: Never      --     Depression: Not at risk (5/4/2025)    PHQ-2     PHQ-2 Score: 0      --     Disabilities: At Risk (5/8/2025)    Disabilities     Concentrating, Remembering, or Making Decisions Difficulty: no     Doing Errands Independently Difficulty: yes      --     Employment: Not At Risk (5/4/2025)    Employment     Do you want help finding or keeping work or a job?: I do not need or want help      Financial Resource Strain: Low Risk  (5/4/2025)    Overall Financial Resource Strain (CARDIA)     Difficulty of Paying Living Expenses: Not very hard      --     Food Insecurity: No Food Insecurity (5/4/2025)    Hunger Vital Sign     Worried About Running Out of Food in the Last Year: Never true     Ran Out of Food in the Last Year: Never true      --     Health Literacy: Not At Risk (5/4/2025)    Education     Help with school or training?: No     Preferred Language: English      --     Housing Stability: Not At Risk (5/8/2025)    Housing Stability     Current Living Arrangements: home     Potentially Unsafe Housing Conditions: none      --     Interpersonal Safety: Not At Risk (5/3/2025)    Abuse Screen     Unsafe at Home or Work/School: no     Feels Threatened by Someone?: no     Does Anyone Keep You from Contacting Others or Doint Things Outside the Home?: no     Physical Sign of Abuse Present: no      --     Physical  Activity: Inactive (5/4/2025)    Exercise Vital Sign     Days of Exercise per Week: 0 days     Minutes of Exercise per Session: 0 min      --     Social Connections: Not At Risk (5/4/2025)    Family and Community Support     Help with Day-to-Day Activities: I get all the help I need     Lonely or Isolated: Never      --     Stress: Stress Concern Present (5/4/2025)    [employer] - Occupational Stress Questionnaire     Feeling of Stress : Rather much      --     Tobacco Use: Medium Risk (5/4/2025)    Patient History     Smoking Tobacco Use: Former     Smokeless Tobacco Use: Never     Passive Exposure: Past      --     Transportation Needs: No Transportation Needs (5/4/2025)    PRAPARE - Transportation     Lack of Transportation (Medical): No     Lack of Transportation (Non-Medical): No      --     Utilities: Not At Risk (5/4/2025)    [facility] Utilities     Threatened with loss of utilities: No         Education Documentation  VTE symptoms, taught by Soledad Stauffer RN at 5/8/2025 11:43 AM.  Learner: Caregiver, Family  Readiness: Acceptance  Method: Explanation, Teach Back  Response: Needs Reinforcement, Verbalizes Understanding    unresolved/worsening symptoms, taught by Soledad Stauffer RN at 5/8/2025 11:43 AM.  Learner: Caregiver, Family  Readiness: Acceptance  Method: Explanation, Teach Back  Response: Needs Reinforcement, Verbalizes Understanding    pulmonary hygiene, taught by Soledad Stauffer RN at 5/8/2025 11:43 AM.  Learner: Caregiver, Family  Readiness: Acceptance  Method: Explanation, Teach Back  Response: Needs Reinforcement, Verbalizes Understanding    provider follow-up, taught by Soledad Stauffer RN at 5/8/2025 11:43 AM.  Learner: Caregiver, Family  Readiness: Acceptance  Method: Explanation, Teach Back  Response: Needs Reinforcement, Verbalizes Understanding    oxygen safety, taught by Soledad Stauffer RN at 5/8/2025 11:43 AM.  Learner: Caregiver, Family  Readiness:  Acceptance  Method: Explanation, Teach Back  Response: Needs Reinforcement, Verbalizes Understanding    infection prevention, taught by Soledad Stauffer RN at 5/8/2025 11:43 AM.  Learner: Caregiver, Family  Readiness: Acceptance  Method: Explanation, Teach Back  Response: Needs Reinforcement, Verbalizes Understanding    COPD action plan, taught by Soledad Stauffer RN at 5/8/2025 11:43 AM.  Learner: Caregiver, Family  Readiness: Acceptance  Method: Explanation, Teach Back  Response: Needs Reinforcement, Verbalizes Understanding    severity of underlying COPD, taught by Soledad Stauffer RN at 5/8/2025 11:43 AM.  Learner: Caregiver, Family  Readiness: Acceptance  Method: Explanation, Teach Back  Response: Needs Reinforcement, Verbalizes Understanding    nonadherence to treatment plan, taught by Soledad Stauffer RN at 5/8/2025 11:43 AM.  Learner: Caregiver, Family  Readiness: Acceptance  Method: Explanation, Teach Back  Response: Needs Reinforcement, Verbalizes Understanding    medical comorbidity, taught by Soledad Stauffer RN at 5/8/2025 11:43 AM.  Learner: Caregiver, Family  Readiness: Acceptance  Method: Explanation, Teach Back  Response: Needs Reinforcement, Verbalizes Understanding    signs/symptoms, taught by Soledad Stauffer RN at 5/8/2025 11:43 AM.  Learner: Caregiver, Family  Readiness: Acceptance  Method: Explanation, Teach Back  Response: Needs Reinforcement, Verbalizes Understanding          Soledad JACOBS  Ambulatory Case Management    5/8/2025, 11:44 EDT

## 2025-05-08 NOTE — PLAN OF CARE
Problem: COPD  Goal: Track and Manage My Symptoms  Outcome: Progressing  Intervention: My COPD Symptoms To Do List  Description: Why is this important?Tracking your symptoms and other information about your health helps your doctor plan your care.Write down the symptoms, the time of day, what you were doing and what medicine you are taking.You will soon learn how to manage your symptoms.  Flowsheets (Taken 5/8/2025 1135)  My COPD Symptoms To Do List: follow rescue plan if symptoms flare up  Goal: Track and Manage My Triggers  Outcome: Progressing  Intervention: My COPD Triggers To Do List  Description: Why is this important?Triggers are activities or things, like tobacco smoke or cold weather, that make your COPD (chronic obstructive pulmonary disease) flare up.Knowing these triggers helps you plan how to stay away from them.When you cannot remove them, you can learn how to manage them.  Flowsheets (Taken 5/8/2025 1135)  My COPD Triggers To Do List: listen for public air quality announcements every day  Goal: Manage My Fatigue (Tiredness)  Outcome: Progressing  Intervention: My Fatigue Management To Do List  Description: Why is this important?Feeling tired or worn out is a common symptom of COPD (chronic obstructive pulmonary disease).Learning when you feel your best and when you need rest is important.Managing the tiredness (fatigue) will help you be active and enjoy life.  Flowsheets (Taken 5/8/2025 1135)  My Fatigue Management To Do List:   keep bedroom cool and dark   develop a new routine to improve sleep   limit daytime naps   eat healthy  Goal: Learn and Do Breathing Exercises  Outcome: Progressing  Intervention: My Breathing Exercises To Do List  Description: Why is this important?Breathing exercises can help lessen the cough that comes with chronic obstructive pulmonary disease.Doing the exercises will give you more energy.They will also help you to control your symptoms.  Flowsheets (Taken 5/8/2025  Pt informed. He states he is feeling pretty good.  He states his weight is 189lbs. He states this has been stable.  He states he used to be lower and would like to get it down some more, but it has been stable 187-191lbs the last couple months.  He states his left foot/ankle is a little swollen, but this has \"been like that for a long time.\"  He denies SOB, chest pain.  He states his belly is \"full\", but this is not new or worsening.    Pt takes furosemide only prn.  He took it for three days earlier this month (6/8, 6/9/ 6/10) and has not taken any since.    When I told him it was recommended to get labs in 2 weeks, he said he would get them done \"sometime\" and wasn't too excited about having them done again so soon.    RASHARD update routed to [employer].   1135)  My Breathing Exercises To Do List: do exercises in a comfortable position that makes breathing as easy as possible  Goal: Optimal Care Coordination of a Patient Experiencing COPD  Outcome: Progressing  Intervention: Support Psychosocial Response to COPD  Flowsheets (Taken 5/8/2025 1135)  Support Psychosocial Response to COPD:   caregiver support provided   emotional support provided   family involvement promoted   verbalization of feelings encouraged  Intervention: Alleviate Barriers to COPD Management  Flowsheets (Taken 5/8/2025 1135)  Alleviate Barriers to COPD Management:   action plan reviewed   communicable disease prevention promoted   medication-adherence assessment completed   self-awareness of symptom triggers encouraged  Intervention: Identify and Minimize Risk of COPD Exacerbation  Flowsheets (Taken 5/8/2025 1135)  Identify and Minimize Risk of COPD Exacerbation:   breathing techniques encouraged   participation in pulmonary rehabilitation encouraged   rescue (action) plan reviewed   signs/symptoms of worsening disease assessed   barriers to treatment reviewed and addressed     Problem: COPD  Goal: Track and Manage My Symptoms  Outcome: Progressing  Intervention: My COPD Symptoms To Do List  Description: Why is this important?Tracking your symptoms and other information about your health helps your doctor plan your care.Write down the symptoms, the time of day, what you were doing and what medicine you are taking.You will soon learn how to manage your symptoms.  Flowsheets (Taken 5/8/2025 1135)  My COPD Symptoms To Do List: follow rescue plan if symptoms flare up  Goal: Track and Manage My Triggers  Outcome: Progressing  Intervention: My COPD Triggers To Do List  Description: Why is this important?Triggers are activities or things, like tobacco smoke or cold weather, that make your COPD (chronic obstructive pulmonary disease) flare up.Knowing these triggers helps you plan how to stay away from  them.When you cannot remove them, you can learn how to manage them.  Flowsheets (Taken 5/8/2025 1135)  My COPD Triggers To Do List: listen for public air quality announcements every day  Goal: Manage My Fatigue (Tiredness)  Outcome: Progressing  Intervention: My Fatigue Management To Do List  Description: Why is this important?Feeling tired or worn out is a common symptom of COPD (chronic obstructive pulmonary disease).Learning when you feel your best and when you need rest is important.Managing the tiredness (fatigue) will help you be active and enjoy life.  Flowsheets (Taken 5/8/2025 1135)  My Fatigue Management To Do List:   keep bedroom cool and dark   develop a new routine to improve sleep   limit daytime naps   eat healthy  Goal: Learn and Do Breathing Exercises  Outcome: Progressing  Intervention: My Breathing Exercises To Do List  Description: Why is this important?Breathing exercises can help lessen the cough that comes with chronic obstructive pulmonary disease.Doing the exercises will give you more energy.They will also help you to control your symptoms.  Flowsheets (Taken 5/8/2025 1135)  My Breathing Exercises To Do List: do exercises in a comfortable position that makes breathing as easy as possible  Goal: Optimal Care Coordination of a Patient Experiencing COPD  Outcome: Progressing  Intervention: Support Psychosocial Response to COPD  Flowsheets (Taken 5/8/2025 1135)  Support Psychosocial Response to COPD:   caregiver support provided   emotional support provided   family involvement promoted   verbalization of feelings encouraged  Intervention: Alleviate Barriers to COPD Management  Flowsheets (Taken 5/8/2025 1135)  Alleviate Barriers to COPD Management:   action plan reviewed   communicable disease prevention promoted   medication-adherence assessment completed   self-awareness of symptom triggers encouraged  Intervention: Identify and Minimize Risk of COPD Exacerbation  Flowsheets (Taken 5/8/2025  1135)  Identify and Minimize Risk of COPD Exacerbation:   breathing techniques encouraged   participation in pulmonary rehabilitation encouraged   rescue (action) plan reviewed   signs/symptoms of worsening disease assessed   barriers to treatment reviewed and addressed

## 2025-05-09 ENCOUNTER — PATIENT OUTREACH (OUTPATIENT)
Age: 80
End: 2025-05-09
Payer: MEDICARE

## 2025-05-09 NOTE — OUTREACH NOTE
SW attempted contact with [facility]. SW left a voicemail. SW will attempt again in a couple of business days.     Hunter LEES -   Ambulatory Case Management    5/9/2025, 13:37 EDT

## 2025-05-12 ENCOUNTER — HOSPITAL ENCOUNTER (INPATIENT)
Facility: HOSPITAL | Age: 80
LOS: 4 days | Discharge: HOME HEALTH | DRG: 070 | End: 2025-05-16
Attending: STUDENT IN AN ORGANIZED HEALTH CARE EDUCATION/TRAINING PROGRAM | Admitting: FAMILY MEDICINE
Payer: MEDICARE

## 2025-05-12 ENCOUNTER — APPOINTMENT (OUTPATIENT)
Dept: GENERAL RADIOLOGY | Facility: HOSPITAL | Age: 80
DRG: 070 | End: 2025-05-12
Payer: MEDICARE

## 2025-05-12 ENCOUNTER — APPOINTMENT (OUTPATIENT)
Dept: CT IMAGING | Facility: HOSPITAL | Age: 80
DRG: 070 | End: 2025-05-12
Payer: MEDICARE

## 2025-05-12 DIAGNOSIS — J44.1 COPD EXACERBATION: ICD-10-CM

## 2025-05-12 DIAGNOSIS — J96.10 CHRONIC RESPIRATORY FAILURE: ICD-10-CM

## 2025-05-12 DIAGNOSIS — G93.40 ACUTE ENCEPHALOPATHY: Primary | ICD-10-CM

## 2025-05-12 DIAGNOSIS — J42 CHRONIC BRONCHITIS, UNSPECIFIED CHRONIC BRONCHITIS TYPE: ICD-10-CM

## 2025-05-12 DIAGNOSIS — R40.4 TRANSIENT ALTERATION OF AWARENESS: ICD-10-CM

## 2025-05-12 DIAGNOSIS — J44.9 CHRONIC OBSTRUCTIVE PULMONARY DISEASE (COPD): ICD-10-CM

## 2025-05-12 PROBLEM — R41.82 ALTERED MENTAL STATUS: Status: ACTIVE | Noted: 2025-05-12

## 2025-05-12 PROBLEM — R41.82 ALTERED MENTAL STATUS, UNSPECIFIED: Status: ACTIVE | Noted: 2025-05-12

## 2025-05-12 LAB
A-A DO2: 79.6 MMHG
ALBUMIN SERPL-MCNC: 3.4 G/DL (ref 3.5–5.2)
ALBUMIN/GLOB SERPL: 1.5 G/DL
ALP SERPL-CCNC: 107 U/L (ref 39–117)
ALT SERPL W P-5'-P-CCNC: 30 U/L (ref 1–33)
AMMONIA BLD-SCNC: 24 UMOL/L (ref 11–51)
ANION GAP SERPL CALCULATED.3IONS-SCNC: 0.7 MMOL/L (ref 5–15)
APTT PPP: 28.1 SECONDS (ref 70–100)
ARTERIAL PATENCY WRIST A: POSITIVE
AST SERPL-CCNC: 20 U/L (ref 1–32)
ATMOSPHERIC PRESS: 734 MMHG
BASE EXCESS BLDA CALC-SCNC: 19.7 MMOL/L (ref 0–2)
BASOPHILS # BLD AUTO: 0.01 10*3/MM3 (ref 0–0.2)
BASOPHILS NFR BLD AUTO: 0.2 % (ref 0–1.5)
BDY SITE: ABNORMAL
BILIRUB SERPL-MCNC: 0.2 MG/DL (ref 0–1.2)
BILIRUB UR QL STRIP: NEGATIVE
BUN SERPL-MCNC: 24 MG/DL (ref 8–23)
BUN/CREAT SERPL: 41.4 (ref 7–25)
CALCIUM SPEC-SCNC: 8.6 MG/DL (ref 8.6–10.5)
CHLORIDE SERPL-SCNC: 101 MMOL/L (ref 98–107)
CLARITY UR: CLEAR
CO2 SERPL-SCNC: 46.3 MMOL/L (ref 22–29)
COHGB MFR BLD: 1.2 % (ref 0–2)
COLOR UR: YELLOW
CORTIS SERPL-MCNC: 18.6 MCG/DL
CREAT SERPL-MCNC: 0.58 MG/DL (ref 0.57–1)
CRP SERPL-MCNC: 3.48 MG/DL (ref 0–0.5)
DEPRECATED RDW RBC AUTO: 53.1 FL (ref 37–54)
EGFRCR SERPLBLD CKD-EPI 2021: 92.8 ML/MIN/1.73
EOSINOPHIL # BLD AUTO: 0.14 10*3/MM3 (ref 0–0.4)
EOSINOPHIL NFR BLD AUTO: 2.5 % (ref 0.3–6.2)
ERYTHROCYTE [DISTWIDTH] IN BLOOD BY AUTOMATED COUNT: 15.4 % (ref 12.3–15.4)
FOLATE SERPL-MCNC: 8.28 NG/ML (ref 4.78–24.2)
GAS FLOW AIRWAY: 3 LPM
GEN 5 1HR TROPONIN T REFLEX: 17 NG/L
GLOBULIN UR ELPH-MCNC: 2.2 GM/DL
GLUCOSE SERPL-MCNC: 105 MG/DL (ref 65–99)
GLUCOSE UR STRIP-MCNC: NEGATIVE MG/DL
HCO3 BLDA-SCNC: 47.2 MMOL/L (ref 22–28)
HCT VFR BLD AUTO: 32.4 % (ref 34–46.6)
HCT VFR BLD CALC: 28.4 %
HGB BLD-MCNC: 9.1 G/DL (ref 12–15.9)
HGB UR QL STRIP.AUTO: NEGATIVE
IMM GRANULOCYTES # BLD AUTO: 0.04 10*3/MM3 (ref 0–0.05)
IMM GRANULOCYTES NFR BLD AUTO: 0.7 % (ref 0–0.5)
INHALED O2 CONCENTRATION: 32 %
INR PPP: 1.13 (ref 0.9–1.1)
KETONES UR QL STRIP: NEGATIVE
LEUKOCYTE ESTERASE UR QL STRIP.AUTO: NEGATIVE
LYMPHOCYTES # BLD AUTO: 1.4 10*3/MM3 (ref 0.7–3.1)
LYMPHOCYTES NFR BLD AUTO: 24.7 % (ref 19.6–45.3)
Lab: ABNORMAL
Lab: ABNORMAL
MAGNESIUM SERPL-MCNC: 2.1 MG/DL (ref 1.6–2.4)
MCH RBC QN AUTO: 26.5 PG (ref 26.6–33)
MCHC RBC AUTO-ENTMCNC: 28.1 G/DL (ref 31.5–35.7)
MCV RBC AUTO: 94.5 FL (ref 79–97)
METHGB BLD QL: 0.1 % (ref 0–1.5)
MODALITY: ABNORMAL
MONOCYTES # BLD AUTO: 0.51 10*3/MM3 (ref 0.1–0.9)
MONOCYTES NFR BLD AUTO: 9 % (ref 5–12)
NEUTROPHILS NFR BLD AUTO: 3.57 10*3/MM3 (ref 1.7–7)
NEUTROPHILS NFR BLD AUTO: 62.9 % (ref 42.7–76)
NITRITE UR QL STRIP: NEGATIVE
NOTIFIED BY: ABNORMAL
NOTIFIED WHO: ABNORMAL
NRBC BLD AUTO-RTO: 0 /100 WBC (ref 0–0.2)
OXYHGB MFR BLDV: 91.2 % (ref 94–99)
PCO2 BLDA: 74.5 MM HG (ref 35–45)
PCO2 TEMP ADJ BLD: ABNORMAL MM[HG]
PH BLDA: 7.41 PH UNITS (ref 7.3–7.5)
PH UR STRIP.AUTO: 7 [PH] (ref 5–8)
PH, TEMP CORRECTED: ABNORMAL
PLATELET # BLD AUTO: 166 10*3/MM3 (ref 140–450)
PMV BLD AUTO: 10.9 FL (ref 6–12)
PO2 BLDA: 57.6 MM HG (ref 75–100)
PO2 TEMP ADJ BLD: ABNORMAL MM[HG]
POTASSIUM SERPL-SCNC: 4.4 MMOL/L (ref 3.5–5.2)
PROT SERPL-MCNC: 5.6 G/DL (ref 6–8.5)
PROT UR QL STRIP: NEGATIVE
PROTHROMBIN TIME: 15.3 SECONDS (ref 12.3–15.1)
RBC # BLD AUTO: 3.43 10*6/MM3 (ref 3.77–5.28)
RBC MORPH BLD: NORMAL
SAO2 % BLDCOA: 92.4 % (ref 94–100)
SMALL PLATELETS BLD QL SMEAR: ADEQUATE
SODIUM SERPL-SCNC: 148 MMOL/L (ref 136–145)
SP GR UR STRIP: 1.02 (ref 1–1.03)
T4 FREE SERPL-MCNC: 1.24 NG/DL (ref 0.92–1.68)
TROPONIN T % DELTA: 0
TROPONIN T NUMERIC DELTA: 0 NG/L
TROPONIN T SERPL HS-MCNC: 17 NG/L
TSH SERPL DL<=0.05 MIU/L-ACNC: 4.36 UIU/ML (ref 0.27–4.2)
TSH SERPL DL<=0.05 MIU/L-ACNC: 7.51 UIU/ML (ref 0.27–4.2)
UROBILINOGEN UR QL STRIP: NORMAL
VENTILATOR MODE: ABNORMAL
VIT B12 BLD-MCNC: 342 PG/ML (ref 211–946)
WBC MORPH BLD: NORMAL
WBC NRBC COR # BLD AUTO: 5.67 10*3/MM3 (ref 3.4–10.8)

## 2025-05-12 PROCEDURE — 94640 AIRWAY INHALATION TREATMENT: CPT

## 2025-05-12 PROCEDURE — 85610 PROTHROMBIN TIME: CPT | Performed by: EMERGENCY MEDICINE

## 2025-05-12 PROCEDURE — 25010000002 METHYLPREDNISOLONE PER 125 MG: Performed by: FAMILY MEDICINE

## 2025-05-12 PROCEDURE — 84443 ASSAY THYROID STIM HORMONE: CPT | Performed by: FAMILY MEDICINE

## 2025-05-12 PROCEDURE — 85730 THROMBOPLASTIN TIME PARTIAL: CPT | Performed by: EMERGENCY MEDICINE

## 2025-05-12 PROCEDURE — 36600 WITHDRAWAL OF ARTERIAL BLOOD: CPT

## 2025-05-12 PROCEDURE — 74177 CT ABD & PELVIS W/CONTRAST: CPT

## 2025-05-12 PROCEDURE — 86140 C-REACTIVE PROTEIN: CPT | Performed by: EMERGENCY MEDICINE

## 2025-05-12 PROCEDURE — 97162 PT EVAL MOD COMPLEX 30 MIN: CPT

## 2025-05-12 PROCEDURE — 25010000002 PIPERACILLIN SOD-TAZOBACTAM PER 1 G: Performed by: FAMILY MEDICINE

## 2025-05-12 PROCEDURE — 83735 ASSAY OF MAGNESIUM: CPT | Performed by: EMERGENCY MEDICINE

## 2025-05-12 PROCEDURE — 82533 TOTAL CORTISOL: CPT | Performed by: FAMILY MEDICINE

## 2025-05-12 PROCEDURE — 80053 COMPREHEN METABOLIC PANEL: CPT | Performed by: EMERGENCY MEDICINE

## 2025-05-12 PROCEDURE — 85007 BL SMEAR W/DIFF WBC COUNT: CPT | Performed by: EMERGENCY MEDICINE

## 2025-05-12 PROCEDURE — 85025 COMPLETE CBC W/AUTO DIFF WBC: CPT | Performed by: EMERGENCY MEDICINE

## 2025-05-12 PROCEDURE — 70450 CT HEAD/BRAIN W/O DYE: CPT

## 2025-05-12 PROCEDURE — 82805 BLOOD GASES W/O2 SATURATION: CPT

## 2025-05-12 PROCEDURE — 94799 UNLISTED PULMONARY SVC/PX: CPT

## 2025-05-12 PROCEDURE — 82746 ASSAY OF FOLIC ACID SERUM: CPT | Performed by: FAMILY MEDICINE

## 2025-05-12 PROCEDURE — 84439 ASSAY OF FREE THYROXINE: CPT | Performed by: STUDENT IN AN ORGANIZED HEALTH CARE EDUCATION/TRAINING PROGRAM

## 2025-05-12 PROCEDURE — 82607 VITAMIN B-12: CPT | Performed by: FAMILY MEDICINE

## 2025-05-12 PROCEDURE — 84145 PROCALCITONIN (PCT): CPT | Performed by: NURSE PRACTITIONER

## 2025-05-12 PROCEDURE — 71045 X-RAY EXAM CHEST 1 VIEW: CPT

## 2025-05-12 PROCEDURE — 81003 URINALYSIS AUTO W/O SCOPE: CPT | Performed by: EMERGENCY MEDICINE

## 2025-05-12 PROCEDURE — 82375 ASSAY CARBOXYHB QUANT: CPT

## 2025-05-12 PROCEDURE — 99285 EMERGENCY DEPT VISIT HI MDM: CPT | Performed by: STUDENT IN AN ORGANIZED HEALTH CARE EDUCATION/TRAINING PROGRAM

## 2025-05-12 PROCEDURE — 84443 ASSAY THYROID STIM HORMONE: CPT | Performed by: STUDENT IN AN ORGANIZED HEALTH CARE EDUCATION/TRAINING PROGRAM

## 2025-05-12 PROCEDURE — 83050 HGB METHEMOGLOBIN QUAN: CPT

## 2025-05-12 PROCEDURE — 25510000001 IOPAMIDOL 61 % SOLUTION: Performed by: STUDENT IN AN ORGANIZED HEALTH CARE EDUCATION/TRAINING PROGRAM

## 2025-05-12 PROCEDURE — 97165 OT EVAL LOW COMPLEX 30 MIN: CPT

## 2025-05-12 PROCEDURE — P9612 CATHETERIZE FOR URINE SPEC: HCPCS

## 2025-05-12 PROCEDURE — 99222 1ST HOSP IP/OBS MODERATE 55: CPT | Performed by: FAMILY MEDICINE

## 2025-05-12 PROCEDURE — 82140 ASSAY OF AMMONIA: CPT | Performed by: EMERGENCY MEDICINE

## 2025-05-12 PROCEDURE — 84484 ASSAY OF TROPONIN QUANT: CPT | Performed by: EMERGENCY MEDICINE

## 2025-05-12 PROCEDURE — 93005 ELECTROCARDIOGRAM TRACING: CPT | Performed by: STUDENT IN AN ORGANIZED HEALTH CARE EDUCATION/TRAINING PROGRAM

## 2025-05-12 RX ORDER — BUDESONIDE 0.5 MG/2ML
0.5 INHALANT ORAL
Status: DISCONTINUED | OUTPATIENT
Start: 2025-05-12 | End: 2025-05-13

## 2025-05-12 RX ORDER — ACETAMINOPHEN 325 MG/1
650 TABLET ORAL EVERY 4 HOURS PRN
Status: DISCONTINUED | OUTPATIENT
Start: 2025-05-12 | End: 2025-05-16 | Stop reason: HOSPADM

## 2025-05-12 RX ORDER — OXYCODONE AND ACETAMINOPHEN 7.5; 325 MG/1; MG/1
1 TABLET ORAL EVERY 8 HOURS PRN
Refills: 0 | Status: DISCONTINUED | OUTPATIENT
Start: 2025-05-12 | End: 2025-05-16 | Stop reason: HOSPADM

## 2025-05-12 RX ORDER — ARFORMOTEROL TARTRATE 15 UG/2ML
15 SOLUTION RESPIRATORY (INHALATION)
Status: DISCONTINUED | OUTPATIENT
Start: 2025-05-12 | End: 2025-05-13

## 2025-05-12 RX ORDER — ACETAMINOPHEN 650 MG/1
650 SUPPOSITORY RECTAL EVERY 4 HOURS PRN
Status: DISCONTINUED | OUTPATIENT
Start: 2025-05-12 | End: 2025-05-16 | Stop reason: HOSPADM

## 2025-05-12 RX ORDER — IOPAMIDOL 612 MG/ML
85 INJECTION, SOLUTION INTRAVASCULAR
Status: COMPLETED | OUTPATIENT
Start: 2025-05-12 | End: 2025-05-12

## 2025-05-12 RX ORDER — PANTOPRAZOLE SODIUM 40 MG/1
40 TABLET, DELAYED RELEASE ORAL DAILY
Status: DISCONTINUED | OUTPATIENT
Start: 2025-05-12 | End: 2025-05-16 | Stop reason: HOSPADM

## 2025-05-12 RX ORDER — BISACODYL 5 MG/1
5 TABLET, DELAYED RELEASE ORAL DAILY PRN
Status: DISCONTINUED | OUTPATIENT
Start: 2025-05-12 | End: 2025-05-16 | Stop reason: HOSPADM

## 2025-05-12 RX ORDER — TRAZODONE HYDROCHLORIDE 50 MG/1
25 TABLET ORAL NIGHTLY
Status: DISCONTINUED | OUTPATIENT
Start: 2025-05-12 | End: 2025-05-13

## 2025-05-12 RX ORDER — SODIUM CHLORIDE 0.9 % (FLUSH) 0.9 %
10 SYRINGE (ML) INJECTION EVERY 12 HOURS SCHEDULED
Status: DISCONTINUED | OUTPATIENT
Start: 2025-05-12 | End: 2025-05-16 | Stop reason: HOSPADM

## 2025-05-12 RX ORDER — IPRATROPIUM BROMIDE AND ALBUTEROL SULFATE 2.5; .5 MG/3ML; MG/3ML
3 SOLUTION RESPIRATORY (INHALATION) EVERY 4 HOURS PRN
Status: DISCONTINUED | OUTPATIENT
Start: 2025-05-12 | End: 2025-05-16 | Stop reason: HOSPADM

## 2025-05-12 RX ORDER — FUROSEMIDE 20 MG/1
20 TABLET ORAL DAILY PRN
Status: DISCONTINUED | OUTPATIENT
Start: 2025-05-12 | End: 2025-05-16 | Stop reason: HOSPADM

## 2025-05-12 RX ORDER — SODIUM CHLORIDE 0.9 % (FLUSH) 0.9 %
10 SYRINGE (ML) INJECTION AS NEEDED
Status: DISCONTINUED | OUTPATIENT
Start: 2025-05-12 | End: 2025-05-16 | Stop reason: HOSPADM

## 2025-05-12 RX ORDER — ONDANSETRON 2 MG/ML
4 INJECTION INTRAMUSCULAR; INTRAVENOUS EVERY 6 HOURS PRN
Status: DISCONTINUED | OUTPATIENT
Start: 2025-05-12 | End: 2025-05-16 | Stop reason: HOSPADM

## 2025-05-12 RX ORDER — MULTIPLE VITAMINS W/ MINERALS TAB 9MG-400MCG
1 TAB ORAL DAILY
Status: DISCONTINUED | OUTPATIENT
Start: 2025-05-12 | End: 2025-05-16 | Stop reason: HOSPADM

## 2025-05-12 RX ORDER — METOPROLOL SUCCINATE 50 MG/1
50 TABLET, EXTENDED RELEASE ORAL
Status: DISCONTINUED | OUTPATIENT
Start: 2025-05-12 | End: 2025-05-16 | Stop reason: HOSPADM

## 2025-05-12 RX ORDER — DIAZEPAM 5 MG/1
5 TABLET ORAL EVERY 8 HOURS PRN
Status: DISCONTINUED | OUTPATIENT
Start: 2025-05-12 | End: 2025-05-16 | Stop reason: HOSPADM

## 2025-05-12 RX ORDER — NITROGLYCERIN 0.4 MG/1
0.4 TABLET SUBLINGUAL
Status: DISCONTINUED | OUTPATIENT
Start: 2025-05-12 | End: 2025-05-16 | Stop reason: HOSPADM

## 2025-05-12 RX ORDER — AMOXICILLIN 250 MG
2 CAPSULE ORAL 2 TIMES DAILY PRN
Status: DISCONTINUED | OUTPATIENT
Start: 2025-05-12 | End: 2025-05-16 | Stop reason: HOSPADM

## 2025-05-12 RX ORDER — DULOXETIN HYDROCHLORIDE 30 MG/1
60 CAPSULE, DELAYED RELEASE ORAL 2 TIMES DAILY
Status: DISCONTINUED | OUTPATIENT
Start: 2025-05-12 | End: 2025-05-16 | Stop reason: HOSPADM

## 2025-05-12 RX ORDER — AMIODARONE HYDROCHLORIDE 200 MG/1
200 TABLET ORAL
Status: DISCONTINUED | OUTPATIENT
Start: 2025-05-12 | End: 2025-05-16 | Stop reason: HOSPADM

## 2025-05-12 RX ORDER — BISACODYL 10 MG
10 SUPPOSITORY, RECTAL RECTAL DAILY PRN
Status: DISCONTINUED | OUTPATIENT
Start: 2025-05-12 | End: 2025-05-16 | Stop reason: HOSPADM

## 2025-05-12 RX ORDER — ATORVASTATIN CALCIUM 40 MG/1
40 TABLET, FILM COATED ORAL DAILY
Status: DISCONTINUED | OUTPATIENT
Start: 2025-05-12 | End: 2025-05-16 | Stop reason: HOSPADM

## 2025-05-12 RX ORDER — DIAZEPAM 5 MG/1
5 TABLET ORAL ONCE
Status: COMPLETED | OUTPATIENT
Start: 2025-05-12 | End: 2025-05-12

## 2025-05-12 RX ORDER — SODIUM CHLORIDE 9 MG/ML
40 INJECTION, SOLUTION INTRAVENOUS AS NEEDED
Status: DISCONTINUED | OUTPATIENT
Start: 2025-05-12 | End: 2025-05-16 | Stop reason: HOSPADM

## 2025-05-12 RX ORDER — POLYETHYLENE GLYCOL 3350 17 G/17G
17 POWDER, FOR SOLUTION ORAL DAILY PRN
Status: DISCONTINUED | OUTPATIENT
Start: 2025-05-12 | End: 2025-05-16 | Stop reason: HOSPADM

## 2025-05-12 RX ORDER — GUAIFENESIN 600 MG/1
600 TABLET, EXTENDED RELEASE ORAL EVERY 12 HOURS SCHEDULED
Status: DISCONTINUED | OUTPATIENT
Start: 2025-05-12 | End: 2025-05-16 | Stop reason: HOSPADM

## 2025-05-12 RX ORDER — METHYLPREDNISOLONE SODIUM SUCCINATE 125 MG/2ML
62.5 INJECTION, POWDER, LYOPHILIZED, FOR SOLUTION INTRAMUSCULAR; INTRAVENOUS DAILY
Status: DISCONTINUED | OUTPATIENT
Start: 2025-05-12 | End: 2025-05-13

## 2025-05-12 RX ORDER — ACETAMINOPHEN 160 MG/5ML
650 SOLUTION ORAL EVERY 4 HOURS PRN
Status: DISCONTINUED | OUTPATIENT
Start: 2025-05-12 | End: 2025-05-16 | Stop reason: HOSPADM

## 2025-05-12 RX ADMIN — IOPAMIDOL 85 ML: 612 INJECTION, SOLUTION INTRAVENOUS at 06:51

## 2025-05-12 RX ADMIN — ARFORMOTEROL TARTRATE 15 MCG: 15 SOLUTION RESPIRATORY (INHALATION) at 18:50

## 2025-05-12 RX ADMIN — Medication 10 ML: at 21:10

## 2025-05-12 RX ADMIN — GUAIFENESIN 600 MG: 600 TABLET, EXTENDED RELEASE ORAL at 21:05

## 2025-05-12 RX ADMIN — AMIODARONE HYDROCHLORIDE 200 MG: 200 TABLET ORAL at 14:26

## 2025-05-12 RX ADMIN — METHYLPREDNISOLONE SODIUM SUCCINATE 62.5 MG: 125 INJECTION, POWDER, FOR SOLUTION INTRAMUSCULAR; INTRAVENOUS at 14:26

## 2025-05-12 RX ADMIN — DIAZEPAM 5 MG: 5 TABLET ORAL at 07:20

## 2025-05-12 RX ADMIN — SERTRALINE 150 MG: 50 TABLET, FILM COATED ORAL at 21:05

## 2025-05-12 RX ADMIN — DULOXETINE 60 MG: 30 CAPSULE, DELAYED RELEASE ORAL at 21:05

## 2025-05-12 RX ADMIN — PANTOPRAZOLE SODIUM 40 MG: 40 TABLET, DELAYED RELEASE ORAL at 14:26

## 2025-05-12 RX ADMIN — Medication 10 ML: at 12:11

## 2025-05-12 RX ADMIN — PIPERACILLIN AND TAZOBACTAM 3.38 G: 3; .375 INJECTION, POWDER, FOR SOLUTION INTRAVENOUS at 21:10

## 2025-05-12 RX ADMIN — APIXABAN 5 MG: 5 TABLET, FILM COATED ORAL at 21:05

## 2025-05-12 RX ADMIN — BUDESONIDE 0.5 MG: 0.5 SUSPENSION RESPIRATORY (INHALATION) at 18:49

## 2025-05-12 RX ADMIN — APIXABAN 5 MG: 5 TABLET, FILM COATED ORAL at 14:26

## 2025-05-12 RX ADMIN — ATORVASTATIN CALCIUM 40 MG: 40 TABLET, FILM COATED ORAL at 14:26

## 2025-05-12 RX ADMIN — TRAZODONE HYDROCHLORIDE 25 MG: 50 TABLET ORAL at 21:05

## 2025-05-12 RX ADMIN — METOPROLOL SUCCINATE 50 MG: 50 TABLET, EXTENDED RELEASE ORAL at 14:26

## 2025-05-12 RX ADMIN — Medication 1 TABLET: at 12:11

## 2025-05-12 RX ADMIN — PIPERACILLIN AND TAZOBACTAM 3.38 G: 3; .375 INJECTION, POWDER, LYOPHILIZED, FOR SOLUTION INTRAVENOUS; PARENTERAL at 14:25

## 2025-05-12 RX ADMIN — GUAIFENESIN 600 MG: 600 TABLET, EXTENDED RELEASE ORAL at 14:26

## 2025-05-12 NOTE — THERAPY EVALUATION
Patient Name: Gabi Dudley  : 1945    MRN: [id number]                              Today's Date: 2025       Admit Date: 2025    Visit Dx:     ICD-10-CM ICD-9-CM   1. Acute encephalopathy  G93.40 348.30     Patient Active Problem List   Diagnosis    Adrenal adenoma    Anxiety    Chronic fatigue    Fibromyalgia    Hyperlipidemia    Essential hypertension    Insomnia    Osteoarthritis of knee    Osteoporosis    Pulmonary emphysema    Simple renal cyst    Tension headache    Vitamin D deficiency    Diverticulosis    Inversion of nipple    Paroxysmal atrial fibrillation    Coronary artery disease involving native coronary artery of native heart without angina pectoris    Autonomic dysfunction    Gastroesophageal reflux disease without esophagitis    Urge incontinence    Erythrasma    Compression fracture of T5 vertebra    Lung nodule    COPD (chronic obstructive pulmonary disease)    Overweight (BMI 25.0-29.9)    Acute on chronic respiratory failure with hypoxia    Acute on chronic respiratory failure with hypoxia and hypercapnia    Iron deficiency anemia    Impaired mobility and ADLs    Acute respiratory failure with hypoxia and hypercapnia    Aspiration pneumonia of right lower lobe    Partial small bowel obstruction    Moderate malnutrition    Bowel obstruction    Enteritis    Diverticulitis    Elevated troponin    Chronic combined systolic and diastolic CHF (congestive heart failure)    NSTEMI (non-ST elevated myocardial infarction)    COPD exacerbation    Altered mental status    Altered mental status, unspecified     Past Medical History:   Diagnosis Date    Adrenal adenoma     Anemia     Arrhythmia     Asthma     Atrial fibrillation     Back pain     Benign colonic polyp     Benign tumor of adrenal gland     Cataract     bilateral    Cholelithiasis     Chronic bronchitis     Chronic bronchitis with COPD (chronic obstructive pulmonary disease)     COPD (chronic obstructive pulmonary disease)  2005    Coronary artery disease     Depression     Diverticulosis Years ago    Elevated cholesterol     Environmental and seasonal allergies     Fibromyalgia     Fibromyalgia, primary Sometime in the 90s    Gastritis     Generalized anxiety disorder     GERD (gastroesophageal reflux disease)     H/O mammogram     Headache Years ago    Hemorrhoids     History of blood transfusion 1985    History of blood transfusion 1985    History of echocardiogram     History of endometriosis     History of nuclear stress test     Hospitalization or health care facility admission within last 6 months     5 times between 11/2022-05/2023    Hypertension     IBS (irritable bowel syndrome)     Impaired functional mobility, balance, gait, and endurance     Impaired mobility     Inverted nipple     Kidney disease     Kidney stone     Liver cyst     Low back pain Years ago    Nodular radiologic density     Nodule of left lung     NSTEMI (non-ST elevated myocardial infarction) 9/24/2024    On home oxygen therapy     2 liters NC QHS    Osteoarthritis     Osteopenia Several years ago    Osteoporosis     PONV (postoperative nausea and vomiting)     Renal cyst     Sinus problem     2014    Sinusitis     Skin cancer     basal cell carcinoma    SOB (shortness of breath)     Tobacco use     Urinary frequency     Urinary tract infection Years ago    Frequent UTIs    Vitamin D deficiency     Wears glasses     Wears partial dentures     upper plate     Past Surgical History:   Procedure Laterality Date    APPENDECTOMY  1980s    CARDIAC CATHETERIZATION  2003    CATARACT EXTRACTION  2013    both eyes    CHOLECYSTECTOMY  2020    CHOLECYSTECTOMY WITH INTRAOPERATIVE CHOLANGIOGRAM N/A 10/30/2020    Procedure: CHOLECYSTECTOMY LAPAROSCOPIC INTRAOPERATIVE CHOLANGIOGRAPHY;  Surgeon: Sima Moses MD;  Location: [facility];  Service: General;  Laterality: N/A;    COLON SURGERY  2020    COLONOSCOPY  03/11/2013    COLONOSCOPY  06/21/2016    COLONOSCOPY N/A  07/24/2019    Procedure: COLONOSCOPY W/ COLD FORCEP POLYPECTOMIES; HOT SNARE POLYPECTOMIES; COLD SNARE POLYPECTOMY;  Surgeon: Goyo Nunez MD;  Location: [facility] ENDOSCOPY;  Service: Gastroenterology    COLONOSCOPY W/ BIOPSIES AND POLYPECTOMY      EXPLORATORY LAPAROTOMY N/A 11/23/2020    Procedure: colectomy, right, closure of enterotomy x 2, reduction of internal volvulus;  Surgeon: Sima Moses MD;  Location: [facility] OR;  Service: General;  Laterality: N/A;    EXPLORATORY LAPAROTOMY N/A 8/9/2023    Procedure: LAPAROTOMY EXPLORATORY WITH LYIS OF ADHESIONS AND  CENTRAL LINE INSERTION;  Surgeon: Bianca Isaac DO;  Location: [facility] OR;  Service: General;  Laterality: N/A;    HYSTERECTOMY  1980s    partial    LYSIS OF ABDOMINAL ADHESIONS      TONSILLECTOMY  1987    UPPER GASTROINTESTINAL ENDOSCOPY  01/13/2015    VAGINAL DELIVERY      x2      General Information       Row Name 05/12/25 1542          Physical Therapy Time and Intention    Document Type evaluation  -MS     Mode of Treatment physical therapy  -MS       Row Name 05/12/25 1542          General Information    Patient Profile Reviewed yes  -MS     Prior Level of Function independent:;gait;transfer  -MS     Existing Precautions/Restrictions fall;oxygen therapy device and L/min  -MS     Barriers to Rehab medically complex;previous functional deficit  -MS       Row Name 05/12/25 1542          Living Environment    Current Living Arrangements home  -MS     People in Home child(ashely), adult  -MS       Row Name 05/12/25 1542          Cognition    Orientation Status (Cognition) oriented x 4  -MS       Row Name 05/12/25 1542          Safety Issues/Impairments Affecting Functional Mobility    Safety Issues Affecting Function (Mobility) insight into deficits/self-awareness;safety precautions follow-through/compliance;positioning of assistive device;sequencing abilities;safety precaution awareness;impulsivity  -MS     Impairments Affecting Function (Mobility)  balance;strength;shortness of breath;pain;endurance/activity tolerance  -MS               User Key  (r) = Recorded By, (t) = Taken By, (c) = Cosigned By      Initials Name Provider Type    Farzad Arndt PT Physical Therapist                   Mobility       Row Name 05/12/25 1543          Bed Mobility    Bed Mobility supine-sit  -MS     Supine-Sit [place] (Bed Mobility) contact guard  -MS       Row Name 05/12/25 1543          Bed-Chair Transfer    Bed-Chair [place] (Transfers) minimum assist (75% patient effort)  -MS     Assistive Device (Bed-Chair Transfers) walker, front-wheeled  -MS       Row Name 05/12/25 1543          Sit-Stand Transfer    Sit-Stand [place] (Transfers) minimum assist (75% patient effort)  -MS     Assistive Device (Sit-Stand Transfers) walker, 4-wheeled  -MS       Row Name 05/12/25 1543          Gait/Stairs (Locomotion)    [place] Level (Gait) minimum assist (75% patient effort)  -MS     Assistive Device (Gait) walker, front-wheeled  -MS     Patient was able to Ambulate yes  -MS     Distance in Feet (Gait) 2  x2  -MS     Deviations/Abnormal Patterns (Gait) festinating/shuffling  -MS     Bilateral Gait Deviations forward flexed posture  -MS               User Key  (r) = Recorded By, (t) = Taken By, (c) = Cosigned By      Initials Name Provider Type    Farzad Arndt PT Physical Therapist                   Obj/Interventions       Row Name 05/12/25 1544          Range of Motion Comprehensive    General Range of Motion bilateral lower extremity ROM WFL  -MS       Row Name 05/12/25 1544          Strength Comprehensive (MMT)    General Manual Muscle Testing (MMT) Assessment lower extremity strength deficits identified  -MS     Comment, General Manual Muscle Testing (MMT) Assessment B LE 4-/5  -MS               User Key  (r) = Recorded By, (t) = Taken By, (c) = Cosigned By      Initials Name Provider Type    Farzad Arndt, HANS Physical Therapist                    Goals/Plan       Row Name 05/12/25 1548          Bed Mobility Goal 1 (PT)    Activity/Assistive Device (Bed Mobility Goal 1, PT) bed mobility activities, all  -MS     [place] Level/Cues Needed (Bed Mobility Goal 1, PT) modified independence  -MS     Time Frame (Bed Mobility Goal 1, PT) long term goal (LTG);2 weeks  -MS       Row Name 05/12/25 1548          Transfer Goal 1 (PT)    Activity/Assistive Device (Transfer Goal 1, PT) transfers, all;sit-to-stand/stand-to-sit  -MS     [place] Level/Cues Needed (Transfer Goal 1, PT) standby assist  -MS     Time Frame (Transfer Goal 1, PT) long term goal (LTG);2 weeks  -MS       Row Name 05/12/25 1548          Gait Training Goal 1 (PT)    Activity/Assistive Device (Gait Training Goal 1, PT) gait (walking locomotion);assistive device use  -MS     [place] Level (Gait Training Goal 1, PT) standby assist  -MS     Distance (Gait Training Goal 1, PT) 25ft  -MS     Time Frame (Gait Training Goal 1, PT) long term goal (LTG);2 weeks  -MS       Row Name 05/12/25 1543          Patient Education Goal (PT)    Activity (Patient Education Goal, PT) ther exer x15 reps ea  -MS     [place]/Cues/Accuracy (Memory Goal 2, PT) demonstrates adequately  -MS     Time Frame (Patient Education Goal, PT) short term goal (STG);1 week  -MS       [facility] Name 05/12/25 154          Therapy Assessment/Plan (PT)    Planned Therapy Interventions (PT) balance training;bed mobility training;gait training;home exercise program;ROM (range of motion);stair training;strengthening;patient/family education;transfer training  -MS               User Key  (r) = Recorded By, (t) = Taken By, (c) = Cosigned By      Initials Name Provider Type    Farzad Arndt, PT Physical Therapist                   Clinical Impression       Row Name 05/12/25 1545          Pain    Pretreatment Pain Rating 0/10 - no pain  -MS     Posttreatment Pain Rating 0/10 - no pain  -MS       Row Name 05/12/25 1545          Plan of  Care Review    Plan of Care Reviewed With patient  -MS     Progress no change  -MS     Outcome Evaluation Pt participated in PT eval this date. Pt presents from home where she lives with her daughter and granddaughter. Pt uses a rollator and states she could walk herself. Pt was CGA to EOB. Pt was Alice for STS transfer. Pt was Alice to t/f to the [facility] [place]. Pt then transferred to the chair.  Pt is expected to benefit from skilled PT tx during this inpatient stay. Pt frequently denies need for STR. pt may benefit from HHPT at d/c.  -MS       Row Name 05/12/25 1545          Therapy Assessment/Plan (PT)    Patient/Family Therapy Goals Statement (PT) to go home  -MS     Rehab Potential (PT) good  -MS     Criteria for Skilled Interventions Met (PT) yes;meets criteria  -MS     Therapy Frequency (PT) 3 times/wk  -MS       Row Name 05/12/25 1545          Vital Signs    O2 Delivery Pre Treatment supplemental O2  -MS     O2 Delivery Intra Treatment supplemental O2  -MS     O2 Delivery Post Treatment supplemental O2  -MS     Pre Patient Position Supine  -MS     Intra Patient Position Standing  -MS     Post Patient Position Sitting  -MS       Row Name 05/12/25 1545          Positioning and Restraints    Pre-Treatment Position in bed  -MS     Post Treatment Position chair  -MS     In Chair call light within reach;encouraged to call for assist;exit alarm on;with family/caregiver  -MS               User Key  (r) = Recorded By, (t) = Taken By, (c) = Cosigned By      Initials Name Provider Type    MS Farzad Crowder, PT Physical Therapist                   Outcome Measures       Row Name 05/12/25 1551 05/12/25 1024       How much help from another person do you currently need...    Turning from your back to your side while in flat bed without using bedrails? 4  -MS 4  -RS    Moving from lying on back to sitting on the side of a flat bed without bedrails? 3  -MS 3  -RS    Moving to and from a bed to a chair (including a wheelchair)? 3  -MS  3  -RS    Standing up from a chair using your arms (e.g., wheelchair, bedside chair)? 3  -MS 3  -RS    Climbing 3-5 steps with a railing? 2  -MS 2  -RS    To walk in hospital room? 3  -MS 3  -RS    AM-PAC 6 Clicks Score (PT) 18  -MS 18  -RS              User Key  (r) = Recorded By, (t) = Taken By, (c) = Cosigned By      Initials Name Provider Type    MS Farzad Crowder PT Physical Therapist    Dewayne Pineda, RN Registered Nurse                                 Physical Therapy Education       Title: PT OT SLP Therapies (In Progress)       Topic: Physical Therapy (In Progress)       Point: Mobility training (Done)       Learning Progress Summary            Patient Acceptance, E, VU by MS at 5/12/2025 1552    Comment: importance of mobility                      Point: Home exercise program (Done)       Learning Progress Summary            Patient Acceptance, E, VU by MS at 5/12/2025 1552    Comment: importance of mobility                      Point: Body mechanics (Not Started)       Learner Progress:  Not documented in this visit.              Point: Precautions (Not Started)       Learner Progress:  Not documented in this visit.                              User Key       Initials Effective Dates Name Provider Type Discipline    MS 08/22/23 -  Farzad Crowder PT Physical Therapist PT                  PT Recommendation and Plan  Planned Therapy Interventions (PT): balance training, bed mobility training, gait training, home exercise program, ROM (range of motion), stair training, strengthening, patient/family education, transfer training  Progress: no change  Outcome Evaluation: Pt participated in PT eval this date. Pt presents from home where she lives with her daughter and granddaughter. Pt uses a rollator and states she could walk herself. Pt was CGA to EOB. Pt was [facility] for STS transfer. Pt was Alice to t/f to the [facility]. Pt then transferred to the chair.  Pt is expected to benefit from skilled PT tx during this  inpatient stay. Pt frequently denies need for STR. pt may benefit from HHPT at d/c.     Time Calculation:   PT Evaluation Complexity  History, PT Evaluation Complexity: 1-2 personal factors and/or comorbidities  Examination of Body Systems (PT Eval Complexity): total of 3 or more elements  Clinical Presentation (PT Evaluation Complexity): evolving  Clinical Decision Making (PT Evaluation Complexity): moderate complexity  Overall Complexity (PT Evaluation Complexity): moderate complexity     PT Charges       Row Name 05/12/25 1553             Time Calculation    Start Time 1511  -MS      PT Received On 05/12/25  -MS      PT Goal Re-Cert Due Date 05/22/25  -MS         Untimed Charges    PT Eval/Re-eval Minutes 47  -MS         Total Minutes    Untimed Charges Total Minutes 47  -MS       Total Minutes 47  -MS                User Key  (r) = Recorded By, (t) = Taken By, (c) = Cosigned By      Initials Name Provider Type    Farzad Arndt, PT Physical Therapist                  Therapy Charges for Today       Code Description Service Date Service Provider Modifiers Qty    [id number] HC PT EVAL MOD COMPLEXITY 3 5/12/2025 Farzad Crowder, PT GP 1            PT G-Codes  AM-PAC 6 Clicks Score (PT): 18  PT Discharge Summary  Anticipated Discharge Disposition (PT): home with assist, home with home health    Farzad Crowder PT  5/12/2025

## 2025-05-12 NOTE — PLAN OF CARE
Goal Outcome Evaluation:  Plan of Care Reviewed With: patient        Progress: no change  Outcome Evaluation: Pt participated in PT eval this date. Pt presents from home where she lives with her daughter and granddaughter. Pt uses a rollator and states she could walk herself. Pt was CGA to EOB. Pt was [facility] for STS transfer. Pt was Alice to t/f to the [facility]. Pt then transferred to the chair.  Pt is expected to benefit from skilled PT tx during this inpatient stay. Pt frequently denies need for STR. pt may benefit from HHPT at d/c.    Anticipated Discharge Disposition (PT): home with assist, home with home health

## 2025-05-12 NOTE — THERAPY EVALUATION
Patient Name: Gabi Dudley  : 1945    MRN: [id number]                              Today's Date: 2025       Admit Date: 2025    Visit Dx:     ICD-10-CM ICD-9-CM   1. Acute encephalopathy  G93.40 348.30     Patient Active Problem List   Diagnosis    Adrenal adenoma    Anxiety    Chronic fatigue    Fibromyalgia    Hyperlipidemia    Essential hypertension    Insomnia    Osteoarthritis of knee    Osteoporosis    Pulmonary emphysema    Simple renal cyst    Tension headache    Vitamin D deficiency    Diverticulosis    Inversion of nipple    Paroxysmal atrial fibrillation    Coronary artery disease involving native coronary artery of native heart without angina pectoris    Autonomic dysfunction    Gastroesophageal reflux disease without esophagitis    Urge incontinence    Erythrasma    Compression fracture of T5 vertebra    Lung nodule    COPD (chronic obstructive pulmonary disease)    Overweight (BMI 25.0-29.9)    Acute on chronic respiratory failure with hypoxia    Acute on chronic respiratory failure with hypoxia and hypercapnia    Iron deficiency anemia    Impaired mobility and ADLs    Acute respiratory failure with hypoxia and hypercapnia    Aspiration pneumonia of right lower lobe    Partial small bowel obstruction    Moderate malnutrition    Bowel obstruction    Enteritis    Diverticulitis    Elevated troponin    Chronic combined systolic and diastolic CHF (congestive heart failure)    NSTEMI (non-ST elevated myocardial infarction)    COPD exacerbation    Altered mental status    Altered mental status, unspecified     Past Medical History:   Diagnosis Date    Adrenal adenoma     Anemia     Arrhythmia     Asthma     Atrial fibrillation     Back pain     Benign colonic polyp     Benign tumor of adrenal gland     Cataract     bilateral    Cholelithiasis     Chronic bronchitis     Chronic bronchitis with COPD (chronic obstructive pulmonary disease)     COPD (chronic obstructive pulmonary disease)  2005    Coronary artery disease     Depression     Diverticulosis Years ago    Elevated cholesterol     Environmental and seasonal allergies     Fibromyalgia     Fibromyalgia, primary Sometime in the 90s    Gastritis     Generalized anxiety disorder     GERD (gastroesophageal reflux disease)     H/O mammogram     Headache Years ago    Hemorrhoids     History of blood transfusion 1985    History of blood transfusion 1985    History of echocardiogram     History of endometriosis     History of nuclear stress test     Hospitalization or health care facility admission within last 6 months     5 times between 11/2022-05/2023    Hypertension     IBS (irritable bowel syndrome)     Impaired functional mobility, balance, gait, and endurance     Impaired mobility     Inverted nipple     Kidney disease     Kidney stone     Liver cyst     Low back pain Years ago    Nodular radiologic density     Nodule of left lung     NSTEMI (non-ST elevated myocardial infarction) 9/24/2024    On home oxygen therapy     2 liters NC QHS    Osteoarthritis     Osteopenia Several years ago    Osteoporosis     PONV (postoperative nausea and vomiting)     Renal cyst     Sinus problem     2014    Sinusitis     Skin cancer     basal cell carcinoma    SOB (shortness of breath)     Tobacco use     Urinary frequency     Urinary tract infection Years ago    Frequent UTIs    Vitamin D deficiency     Wears glasses     Wears partial dentures     upper plate     Past Surgical History:   Procedure Laterality Date    APPENDECTOMY  1980s    CARDIAC CATHETERIZATION  2003    CATARACT EXTRACTION  2013    both eyes    CHOLECYSTECTOMY  2020    CHOLECYSTECTOMY WITH INTRAOPERATIVE CHOLANGIOGRAM N/A 10/30/2020    Procedure: CHOLECYSTECTOMY LAPAROSCOPIC INTRAOPERATIVE CHOLANGIOGRAPHY;  Surgeon: Sima Moses MD;  Location: [facility];  Service: General;  Laterality: N/A;    COLON SURGERY  2020    COLONOSCOPY  03/11/2013    COLONOSCOPY  06/21/2016    COLONOSCOPY N/A  07/24/2019    Procedure: COLONOSCOPY W/ COLD FORCEP POLYPECTOMIES; HOT SNARE POLYPECTOMIES; COLD SNARE POLYPECTOMY;  Surgeon: Goyo Nunez MD;  Location: [facility] ENDOSCOPY;  Service: Gastroenterology    COLONOSCOPY W/ BIOPSIES AND POLYPECTOMY      EXPLORATORY LAPAROTOMY N/A 11/23/2020    Procedure: colectomy, right, closure of enterotomy x 2, reduction of internal volvulus;  Surgeon: Sima Moses MD;  Location: [facility] OR;  Service: General;  Laterality: N/A;    EXPLORATORY LAPAROTOMY N/A 8/9/2023    Procedure: LAPAROTOMY EXPLORATORY WITH LYIS OF ADHESIONS AND  CENTRAL LINE INSERTION;  Surgeon: Bianca Isaac DO;  Location: [facility] OR;  Service: General;  Laterality: N/A;    HYSTERECTOMY  1980s    partial    LYSIS OF ABDOMINAL ADHESIONS      TONSILLECTOMY  1987    UPPER GASTROINTESTINAL ENDOSCOPY  01/13/2015    VAGINAL DELIVERY      x2      General Information       Row Name 05/12/25 1658          OT Time and Intention    Document Type evaluation  -SP     Mode of Treatment occupational therapy  -SP     Patient Effort good  -SP       Row Name 05/12/25 9896          General Information    Patient Profile Reviewed yes  -SP     Prior Level of Function independent:;all household mobility;min assist:;ADL's  -SP     Existing Precautions/Restrictions fall;oxygen therapy device and L/min  -SP     Barriers to Rehab medically complex;previous functional deficit  -SP       Row Name 05/12/25 1656          Living Environment    Current Living Arrangements home  -SP     People in Home child(ashely), adult  -SP       Row Name 05/12/25 1656          Home Main Entrance    Number of Stairs, Main Entrance other (see comments)  -SP       Row Name 05/12/25 1656          Stairs Within Home, Primary    Stairs, Within Home, Primary pt reports she has a ramped entry  -SP     Number of Stairs, Within Home, Primary other (see comments)  -SP     Stairs Comment, Within Home, Primary pt reports it is a multi-lvl home but she is able to live  on the primary lvl  -SP       Row Name 05/12/25 1656          Cognition    Orientation Status (Cognition) oriented x 4  -SP       Row Name 05/12/25 1656          Safety Issues/Impairments Affecting Functional Mobility    Safety Issues Affecting Function (Mobility) insight into deficits/self-awareness;safety precautions follow-through/compliance;safety precaution awareness  -SP     Impairments Affecting Function (Mobility) balance;strength;shortness of breath;pain;endurance/activity tolerance  -SP               User Key  (r) = Recorded By, (t) = Taken By, (c) = Cosigned By      Initials Name Provider Type    Zandra Butt OT Occupational Therapist                     Mobility/ADL's       Row Name 05/12/25 1659          Bed Mobility    Bed Mobility supine-sit  -SP     Supine-Sit [place] (Bed Mobility) contact guard  -SP       Row Name 05/12/25 1659          Transfers    Transfers sit-stand transfer;bed-chair transfer  -SP       Row Name 05/12/25 1659          Bed-Chair Transfer    Bed-Chair [place] (Transfers) minimum assist (75% patient effort)  -SP     Assistive Device (Bed-Chair Transfers) walker, front-wheeled  -SP       Row Name 05/12/25 1659          Sit-Stand Transfer    Sit-Stand [place] (Transfers) minimum assist (75% patient effort)  -SP     Assistive Device (Sit-Stand Transfers) walker, 4-wheeled  -SP       Row Name 05/12/25 1659          Functional Mobility    Functional Mobility- Ind. Level minimum assist (75% patient effort);verbal cues required  -SP     Functional Mobility- Device walker, front-wheeled  -SP     Functional Mobility-Distance (Feet) --  4ft + 6ft  -SP     Patient was able to Ambulate yes  -SP       Row Name 05/12/25 1659          Activities of Daily Living    BADL Assessment/Intervention bathing;upper body dressing;lower body dressing;grooming;feeding;toileting  -SP       Row Name 05/12/25 1659          Bathing Assessment/Intervention    [place] Level (Bathing)  bathing skills;minimum assist (75% patient effort)  -SP       Row Name 05/12/25 1659          Upper Body Dressing Assessment/Training    [place] Level (Upper Body Dressing) upper body dressing skills;contact guard assist;set up  -SP       Row Name 05/12/25 1659          Lower Body Dressing Assessment/Training    [place] Level (Lower Body Dressing) lower body dressing skills;minimum assist (75% patient effort)  -SP       Row Name 05/12/25 1659          Grooming Assessment/Training    [place] Level (Grooming) grooming skills;set up;standby assist  -SP       Row Name 05/12/25 1659          Self-Feeding Assessment/Training    [place] Level (Feeding) feeding skills;set up  -SP       [facility] Name 05/12/25 1659          Toileting Assessment/Training    [place] Level (Toileting) toileting skills;contact guard assist  -SP               User Key  (r) = Recorded By, (t) = Taken By, (c) = Cosigned By      Initials Name Provider Type    Zandra Butt OT Occupational Therapist                   Obj/Interventions       Row Name 05/12/25 1701          Range of Motion Comprehensive    General Range of Motion bilateral upper extremity ROM WFL  -SP       [facility] Name 05/12/25 1701          Strength Comprehensive (MMT)    General Manual Muscle Testing (MMT) Assessment upper extremity strength deficits identified  -SP       [facility] Name 05/12/25 1701          Upper Extremity (Manual Muscle Testing)    Comment, MMT: Upper Extremity B UE grossl 4-/5  -SP       [facility] Name 05/12/25 1701          Balance    Balance Assessment sitting static balance;sitting dynamic balance;standing static balance;standing dynamic balance  -SP     Static Sitting Balance standby assist  -SP     Dynamic Sitting Balance standby assist  -SP     Position, Sitting Balance unsupported;sitting edge of bed  -SP     Static Standing Balance contact guard  -SP     Dynamic Standing Balance minimal assist  -SP     Position/Device Used, Standing Balance  supported;walker, front-wheeled  -SP               User Key  (r) = Recorded By, (t) = Taken By, (c) = Cosigned By      Initials Name Provider Type    Zandra Butt OT Occupational Therapist                   Goals/Plan       Row Name 05/12/25 1705          Transfer Goal 1 (OT)    Activity/Assistive Device (Transfer Goal 1, OT) commode  -SP     [place] Level/Cues Needed (Transfer Goal 1, OT) standby assist  -SP     Time Frame (Transfer Goal 1, OT) short term goal (STG);5 days  -SP     Progress/Outcome (Transfer Goal 1, OT) goal ongoing  -SP       Row Name 05/12/25 1705          Bathing Goal 1 (OT)    Activity/Device (Bathing Goal 1, OT) bathing skills, all  -SP     [place] Level/Cues Needed (Bathing Goal 1, OT) standby assist  -SP     Time Frame (Bathing Goal 1, OT) long term goal (LTG);10 days  -SP     Progress/Outcomes (Bathing Goal 1, OT) goal ongoing  -SP       Row Name 05/12/25 1705          Dressing Goal 1 (OT)    Activity/Device (Dressing Goal 1, OT) dressing skills, all  -SP     [place]/Cues Needed (Dressing Goal 1, OT) standby assist  -SP     Time Frame (Dressing Goal 1, OT) long term goal (LTG);10 days  -SP     Progress/Outcome (Dressing Goal 1, OT) goal ongoing  -SP       Row Name 05/12/25 1705          Toileting Goal 1 (OT)    Activity/Device (Toileting Goal 1, OT) toileting skills, all  -SP     [place] Level/Cues Needed (Toileting Goal 1, OT) modified independence;standby assist  -SP     Time Frame (Toileting Goal 1, OT) short term goal (STG);5 days  -SP     Progress/Outcome (Toileting Goal 1, OT) goal ongoing  -SP       Row Name 05/12/25 1705          Strength Goal 1 (OT)    Strength Goal 1 (OT) Pt will demonstrate independence with an UE HEP to maximize her strength and endurance for ADL and functional mobility engagement.  -SP     Time Frame (Strength Goal 1, OT) short term goal (STG);5 days  -SP     Progress/Outcome (Strength Goal 1, OT) goal ongoing  -SP       Row Name  05/12/25 1700          Therapy Assessment/Plan (OT)    Planned Therapy Interventions (OT) activity tolerance training;BADL retraining;functional balance retraining;IADL retraining;occupation/activity based interventions;patient/caregiver education/training;ROM/therapeutic exercise;strengthening exercise;transfer/mobility retraining  -SP               User Key  (r) = Recorded By, (t) = Taken By, (c) = Cosigned By      Initials Name Provider Type    SP Zandra Cox OT Occupational Therapist                   Clinical Impression       Row Name 05/12/25 1702          Pain Assessment    Pretreatment Pain Rating 0/10 - no pain  -SP     Posttreatment Pain Rating 0/10 - no pain  -SP       Row Name 05/12/25 1702          Plan of Care Review    Plan of Care Reviewed With patient  -SP     Progress no change  -SP     Outcome Evaluation Pt participated in the OT evaluation. She was received in the bed and slightly drowsy prior to mobilizing. She was on 4L nc and oriented x4 with cueing. Pt reports living with her daughter and granddaughter in a multi-lvl home with a ramped entry. She states she is able to live on the primary lvl and ambulates household distances with a rollator and requires min A from her daughter for ADLs occassionally. On assessment she moved wot ob with CGA. She was able to transfer to standing with min A using the Rwx and ambulate 4ft to the BSC with min A and the Rwx. Pt completed toileting in standing with CGA and Rwx balance support with increased time and she was able to ambulate to the chair x6ft with CGA. She was positioned for comfort with all needs in reach and the chair alarm activated. Recommend continued skilled OT services to address ADL, functional mobility, strength, endurance and activity tolerance training. Recommend she dc home with family assistance and HH PT/OT when medically ready.  -SP       Row Name 05/12/25 1702          Therapy Assessment/Plan (OT)    Rehab Potential (OT) good  -SP      Criteria for Skilled Therapeutic Interventions Met (OT) yes;meets criteria  -SP     Therapy Frequency (OT) 3 times/wk  -SP       Row Name 05/12/25 1702          Therapy Plan Review/Discharge Plan (OT)    Anticipated Discharge Disposition (OT) home with assist;home with home health  -SP       Row Name 05/12/25 1702          Vital Signs    O2 Delivery Pre Treatment supplemental O2  4L  -SP     O2 Delivery Intra Treatment supplemental O2  -SP     O2 Delivery Post Treatment supplemental O2  -SP     Pre Patient Position Supine  -SP     Intra Patient Position Standing  -SP     Post Patient Position Sitting  -SP       Row Name 05/12/25 1702          Positioning and Restraints    Pre-Treatment Position in bed  -SP     Post Treatment Position chair  -SP     In Chair notified nsg;sitting;call light within reach;encouraged to call for assist;exit alarm on  -SP               User Key  (r) = Recorded By, (t) = Taken By, (c) = Cosigned By      Initials Name Provider Type    Zandra Butt, OT Occupational Therapist                   Outcome Measures       Row Name 05/12/25 1707          How much help from another is currently needed...    Putting on and taking off regular lower body clothing? 3  -SP     Bathing (including washing, rinsing, and drying) 3  -SP     Toileting (which includes using toilet bed pan or urinal) 3  -SP     Putting on and taking off regular upper body clothing 3  -SP     Taking care of personal grooming (such as brushing teeth) 3  -SP     Eating meals 3  -SP     AM-PAC 6 Clicks Score (OT) 18  -SP       Row Name 05/12/25 1551 05/12/25 1024       How much help from another person do you currently need...    Turning from your back to your side while in flat bed without using bedrails? 4  -MS 4  -RS    Moving from lying on back to sitting on the side of a flat bed without bedrails? 3  -MS 3  -RS    Moving to and from a bed to a chair (including a wheelchair)? 3  -MS 3  -RS    Standing up from a chair using  your arms (e.g., wheelchair, bedside chair)? 3  -MS 3  -RS    Climbing 3-5 steps with a railing? 2  -MS 2  -RS    To walk in hospital room? 3  -MS 3  -RS    AM-PAC 6 Clicks Score (PT) 18  -MS 18  -RS      Row Name 05/12/25 1707          Functional Assessment    Outcome Measure Options AM-PAC 6 Clicks Daily Activity (OT)  -SP               User Key  (r) = Recorded By, (t) = Taken By, (c) = Cosigned By      Initials Name Provider Type    MS Farzad Crowder, PT Physical Therapist    Zandra Butt, OT Occupational Therapist    RS Dewayne Marr, RN Registered Nurse                    Occupational Therapy Education       Title: PT OT SLP Therapies (In Progress)       Topic: Occupational Therapy (In Progress)       Point: ADL training (Done)       Learning Progress Summary            Patient Acceptance, E, VU by SP at 5/12/2025 1707    Comment: Pt was educated on the purpose of the OT eval and POC.                                      User Key       Initials Effective Dates Name Provider Type Discipline    SP 09/08/22 -  Zandra Cox, OT Occupational Therapist OT                  OT Recommendation and Plan  Planned Therapy Interventions (OT): activity tolerance training, BADL retraining, functional balance retraining, IADL retraining, occupation/activity based interventions, patient/caregiver education/training, ROM/therapeutic exercise, strengthening exercise, transfer/mobility retraining  Therapy Frequency (OT): 3 times/wk  Plan of Care Review  Plan of Care Reviewed With: patient  Progress: no change  Outcome Evaluation: Pt participated in the OT evaluation. She was received in the bed and slightly drowsy prior to mobilizing. She was on 4L nc and oriented x4 with cueing. Pt reports living with her daughter and granddaughter in a multi-lvl home with a ramped entry. She states she is able to live on the primary lvl and ambulates household distances with a rollator and requires min A from her daughter for ADLs  occassionally. On assessment she moved wot ob with CGA. She was able to transfer to standing with min A using the Rwx and ambulate 4ft to the BSC with min A and the Rwx. Pt completed toileting in standing with CGA and Rwx balance support with increased time and she was able to ambulate to the chair x6ft with CGA. She was positioned for comfort with all needs in reach and the chair alarm activated. Recommend continued skilled OT services to address ADL, functional mobility, strength, endurance and activity tolerance training. Recommend she dc home with family assistance and HH PT/OT when medically ready.     Time Calculation:   Evaluation Complexity (OT)  Review Occupational Profile/Medical/Therapy History Complexity: brief/low complexity  Assessment, Occupational Performance/Identification of Deficit Complexity: 3-5 performance deficits  Clinical Decision Making Complexity (OT): problem focused assessment/low complexity  Overall Complexity of Evaluation (OT): low complexity     Time Calculation- OT       Row Name 05/12/25 1708             Time Calculation- OT    OT Start Time 1507  -SP      OT Received On 05/12/25  -SP      OT Goal Re-Cert Due Date 05/22/25  -SP         Untimed Charges    OT Eval/Re-eval Minutes 42  -SP         Total Minutes    Untimed Charges Total Minutes 42  -SP       Total Minutes 42  -SP                User Key  (r) = Recorded By, (t) = Taken By, (c) = Cosigned By      Initials Name Provider Type    SP Zandra Cox OT Occupational Therapist                  Therapy Charges for Today       Code Description Service Date Service Provider Modifiers Qty    [id number] HC OT EVAL LOW COMPLEXITY 3 5/12/2025 Zandra Cox OT GO 1                 Zandra Cox OT  5/12/2025

## 2025-05-12 NOTE — PLAN OF CARE
Problem: Violence Risk or Actual  Goal: Anger and Impulse Control  Outcome: Progressing  Intervention: Minimize Safety Risk  Recent Flowsheet Documentation  Taken 5/12/2025 1600 by Dewayne Marr RN  Enhanced Safety Measures:   family to remain at bedside   chair alarm set  Taken 5/12/2025 1400 by Dewayne Marr RN  Enhanced Safety Measures:   bed alarm set   family to remain at bedside  Taken 5/12/2025 1200 by Dewayne Marr RN  Enhanced Safety Measures:   bed alarm set   family to remain at bedside  Taken 5/12/2025 1030 by Dewayne Marr RN  Behavior Management: boundaries reinforced  Enhanced Safety Measures:   bed alarm set   family to remain at bedside  Intervention: Promote Self-Control  Recent Flowsheet Documentation  Taken 5/12/2025 1030 by Dewayne Marr RN  Supportive Measures:   relaxation techniques promoted   positive reinforcement provided     Problem: Adult Inpatient Plan of Care  Goal: Plan of Care Review  Outcome: Progressing  Goal: Patient-Specific Goal (Individualized)  Outcome: Progressing  Goal: Absence of Hospital-Acquired Illness or Injury  Outcome: Progressing  Intervention: Identify and Manage Fall Risk  Recent Flowsheet Documentation  Taken 5/12/2025 1600 by Dewayne Marr RN  Safety Promotion/Fall Prevention: safety round/check completed  Taken 5/12/2025 1400 by Dewayne Marr RN  Safety Promotion/Fall Prevention: safety round/check completed  Taken 5/12/2025 1200 by Dewayne Marr RN  Safety Promotion/Fall Prevention: safety round/check completed  Taken 5/12/2025 1030 by Dewayne Marr RN  Safety Promotion/Fall Prevention: safety round/check completed  Intervention: Prevent Skin Injury  Recent Flowsheet Documentation  Taken 5/12/2025 1600 by Dewayne Marr RN  Body Position:   legs elevated   position changed independently  Taken 5/12/2025 1400 by Dewayne Marr RN  Body Position:   position changed independently   supine   weight shifting  Taken 5/12/2025 1200 by Dewayne Marr  RN  Body Position:   position changed independently   side-lying   right  Taken 5/12/2025 1030 by Dewayne Marr RN  Body Position:   position changed independently   side-lying   left  Skin Protection: incontinence pads utilized  Intervention: Prevent and Manage VTE (Venous Thromboembolism) Risk  Recent Flowsheet Documentation  Taken 5/12/2025 1032 by Dewayne Marr RN  VTE Prevention/Management:   SCDs (sequential compression devices) off   patient refused intervention  Goal: Optimal Comfort and Wellbeing  Outcome: Progressing  Intervention: Provide Person-Centered Care  Recent Flowsheet Documentation  Taken 5/12/2025 1030 by Dewayne Marr RN  Trust Relationship/Rapport:   care explained   choices provided   emotional support provided   empathic listening provided   questions answered   questions encouraged   reassurance provided   thoughts/feelings acknowledged  Goal: Readiness for Transition of Care  Outcome: Progressing  Intervention: Mutually Develop Transition Plan  Recent Flowsheet Documentation  Taken 5/12/2025 1025 by Dewayne Marr RN  Equipment Currently Used at Home:   wheelchair   rollator   bipap   oxygen   nebulizer   shower chair     Problem: Fall Injury Risk  Goal: Absence of Fall and Fall-Related Injury  Outcome: Progressing  Intervention: Identify and Manage Contributors  Recent Flowsheet Documentation  Taken 5/12/2025 1030 by Dewayne Marr RN  Medication Review/Management: medications reviewed  Intervention: Promote Injury-Free Environment  Recent Flowsheet Documentation  Taken 5/12/2025 1600 by Dewayne Marr RN  Safety Promotion/Fall Prevention: safety round/check completed  Taken 5/12/2025 1400 by Dewayne Marr RN  Safety Promotion/Fall Prevention: safety round/check completed  Taken 5/12/2025 1200 by Dewayne Marr RN  Safety Promotion/Fall Prevention: safety round/check completed  Taken 5/12/2025 1030 by Dewayne Marr RN  Safety Promotion/Fall Prevention: safety round/check completed      Problem: Skin Injury Risk Increased  Goal: Skin Health and Integrity  Outcome: Progressing  Intervention: Optimize Skin Protection  Recent Flowsheet Documentation  Taken 5/12/2025 1600 by Dewayne Marr RN  Activity Management:   up in chair   activity encouraged  Taken 5/12/2025 1400 by Dewayne Marr RN  Activity Management: activity encouraged  Head of Bed (HOB) Positioning: HOB lowered  Taken 5/12/2025 1200 by Dewayne Marr RN  Activity Management:   activity encouraged   up to bedside commode  Head of Bed (HOB) Positioning: HOB lowered  Taken 5/12/2025 1030 by Dewayne aMrr RN  Activity Management: activity encouraged  Pressure Reduction Techniques:   frequent weight shift encouraged   pressure points protected  Head of Bed (HOB) Positioning: HOB lowered  Pressure Reduction Devices: positioning supports utilized  Skin Protection: incontinence pads utilized     Problem: Comorbidity Management  Goal: Maintenance of COPD Symptom Control  Outcome: Progressing  Intervention: Maintain COPD (Chronic Obstructive Pulmonary Disease) Symptom Control  Recent Flowsheet Documentation  Taken 5/12/2025 1030 by Dewayne Marr RN  Medication Review/Management: medications reviewed  Goal: Maintenance of Heart Failure Symptom Control  Outcome: Progressing  Intervention: Maintain Heart Failure Management  Recent Flowsheet Documentation  Taken 5/12/2025 1030 by Dewayne Marr RN  Medication Review/Management: medications reviewed  Goal: Blood Pressure in Desired Range  Outcome: Progressing  Intervention: Maintain Blood Pressure Management  Recent Flowsheet Documentation  Taken 5/12/2025 1030 by Dewayne Marr RN  Medication Review/Management: medications reviewed   Goal Outcome Evaluation:

## 2025-05-12 NOTE — H&P
[facility] HOSPITALIST   HISTORY AND PHYSICAL      Name:  Gabi Dudley   Age:  80 y.o.  Sex:  female  :  1945  MRN:  [id number]   Visit Number:  [id number]  Admission Date:  2025  Date Of Service:  25  Primary Care Physician:  Krystina Saunders DO    Chief Complaint:     Altered mental status    History Of Presenting Illness:      Patient is a 80-year-old female brought to the emergency department for evaluation of altered mental status.  Patient is a poor historian, at the time of evaluation she was oriented to person and place only.  History of present illness was obtained from review of medical records, inflation of family over the bedside.  Patient was brought in from home via EMS.  Patient reportedly was last known normal yesterday, with a baseline alert and oriented to person place and time, able to carry on a conversation, and interact without difficulty.  Patient reportedly was recently discharged from the hospital for COPD exacerbation.  Patient herself does report that her oxygen concentration at home does not work, and she has been having worsening shortness of breath.  Workup in the emergency department showed hypoxic hypercapnic respiratory failure.  Hospitalist services consulted for admission of altered mental status and respiratory failure.    Review Of Systems:    All systems were reviewed and negative except as mentioned in history of presenting illness, assessment and plan.    Past Medical History: Patient  has a past medical history of Adrenal adenoma, Anemia, Arrhythmia, Asthma, Atrial fibrillation, Back pain, Benign colonic polyp, Benign tumor of adrenal gland, Cataract, Cholelithiasis, Chronic bronchitis, Chronic bronchitis with COPD (chronic obstructive pulmonary disease), COPD (chronic obstructive pulmonary disease) (), Coronary artery disease, Depression, Diverticulosis (Years ago), Elevated cholesterol, Environmental and seasonal allergies,  Fibromyalgia, Fibromyalgia, primary (Sometime in the 90s), Gastritis, Generalized anxiety disorder, GERD (gastroesophageal reflux disease), H/O mammogram, Headache (Years ago), Hemorrhoids, History of blood transfusion (1985), History of blood transfusion (1985), History of echocardiogram, History of endometriosis, History of nuclear stress test, Hospitalization or health care facility admission within last 6 months, Hypertension, IBS (irritable bowel syndrome), Impaired functional mobility, balance, gait, and endurance, Impaired mobility, Inverted nipple, Kidney disease, Kidney stone, Liver cyst, Low back pain (Years ago), Nodular radiologic density, Nodule of left lung, NSTEMI (non-ST elevated myocardial infarction) (9/24/2024), On home oxygen therapy, Osteoarthritis, Osteopenia (Several years ago), Osteoporosis, PONV (postoperative nausea and vomiting), Renal cyst, Sinus problem, Sinusitis, Skin cancer, SOB (shortness of breath), Tobacco use, Urinary frequency, Urinary tract infection (Years ago), Vitamin D deficiency, Wears glasses, and Wears partial dentures.    Past Surgical History: Patient  has a past surgical history that includes Appendectomy (1980s); Tonsillectomy (1987); Colonoscopy w/ biopsies and polypectomy; Colonoscopy (03/11/2013); Colonoscopy (06/21/2016); Upper gastrointestinal endoscopy (01/13/2015); Vaginal delivery; Colonoscopy (N/A, 07/24/2019); Cataract extraction (2013); Hysterectomy (1980s); Cardiac catheterization (2003); Abdominal adhesion surgery; cholecystectomy with intraoperative cholangiogram (N/A, 10/30/2020); Exploratory Laparotomy (N/A, 11/23/2020); Cholecystectomy (2020); Colon surgery (2020); and Exploratory Laparotomy (N/A, 8/9/2023).    Social History: Patient  reports that she quit smoking about 4 years ago. Her smoking use included cigarettes. She started smoking about 34 years ago. She has a 7.5 pack-year smoking history. She has been exposed to tobacco smoke. She has never  used smokeless tobacco. She reports that she does not drink alcohol and does not use drugs.    Family History:  Patient's family history has been reviewed and found to be noncontributory.     Allergies:      Ativan [lorazepam] and Doxycycline    Home Medications:    Prior to Admission Medications       Prescriptions Last Dose Informant Patient Reported? Taking?    albuterol (PROVENTIL) (2.5 MG/3ML) 0.083% nebulizer solution 5/11/2025  No Yes    Take 2.5 mg by nebulization Every 4 (Four) Hours As Needed for Wheezing.    albuterol sulfate  (90 Base) MCG/ACT inhaler 5/11/2025  No Yes    Inhale 2 puffs Every 4 (Four) Hours As Needed for Wheezing.    amiodarone (PACERONE) 200 MG tablet 5/11/2025  No Yes    Take 1 tablet by mouth Daily.    apixaban (Eliquis) 5 MG tablet tablet 5/11/2025  No Yes    Take 1 tablet by mouth Every 12 (Twelve) Hours.    atorvastatin (Lipitor) 40 MG tablet 5/11/2025  No Yes    Take 1 tablet by mouth Daily.    benzonatate (TESSALON) 100 MG capsule Past Week  No Yes    Take 1 capsule by mouth 3 (Three) Times a Day As Needed for Cough.    Budeson-Glycopyrrol-Formoterol (Breztri Aerosphere) 160-9-4.8 MCG/ACT aerosol inhaler 5/11/2025  No Yes    Inhale 2 puffs 2 (Two) Times a Day. Rinse mouth out after use    clotrimazole-betamethasone (Lotrisone) 1-0.05 % cream Past Week  No Yes    Apply 1 Application topically to the appropriate area as directed 2 (Two) Times a Day.    diazePAM (Valium) 5 MG tablet 5/12/2025  No Yes    Take 1 tablet by mouth Every 8 (Eight) Hours As Needed for Anxiety.    Diclofenac Sodium (VOLTAREN) 1 % gel gel Past Week  No Yes    Apply 4 g topically to the appropriate area as directed 4 (Four) Times a Day As Needed (pain).    DULoxetine (CYMBALTA) 60 MG capsule 5/11/2025  No Yes    TAKE 1 CAPSULE BY MOUTH 2 (TWO) TIMES A DAY.    fluconazole (Diflucan) 100 MG tablet Unknown  No No    Take 1 tablet by mouth Daily.    fluticasone (FLONASE) 50 MCG/ACT nasal spray Past Week   No Yes    Administer 2 sprays into the nostril(s) as directed by provider Daily.    furosemide (LASIX) 20 MG tablet Unknown  No No    Take 1 tablet by mouth Daily As Needed (SOA-edema- Greater than 2 lb weight gain) for up to 30 days.    ipratropium-albuterol (DUO-NEB) 0.5-2.5 mg/3 ml nebulizer Past Week  No Yes    USE 3 mL VIA NEBULIZER EVERY 4 HOURS AS NEEDED FOR WHEEZING    meclizine (ANTIVERT) 25 MG tablet Past Week  No Yes    Take 1 tablet by mouth 3 (Three) Times a Day As Needed for Dizziness.    metoprolol succinate XL (TOPROL-XL) 50 MG 24 hr tablet 5/11/2025  No Yes    Take 1 tablet by mouth Daily.    naloxone (NARCAN) 4 MG/0.1ML nasal spray   Yes No    Administer 1 spray into the nostril(s) as directed by provider.    O2 (OXYGEN)   Yes No    Inhale 2 L/min As Needed.    ondansetron ODT (ZOFRAN-ODT) 8 MG disintegrating tablet Unknown  No No    Place 1 tablet on the tongue Every 8 (Eight) Hours As Needed for Nausea.    oxyCODONE-acetaminophen (PERCOCET) 7.5-325 MG per tablet 5/11/2025  No Yes    Take 1 tablet by mouth Every 4 (Four) Hours As Needed for Moderate Pain or Severe Pain for up to 5 doses.    pantoprazole (PROTONIX) 40 MG EC tablet 5/11/2025  No Yes    Take 1 tablet by mouth Daily.    prochlorperazine (COMPAZINE) 5 MG tablet Past Week  No Yes    Take 1 tablet by mouth Every 6 (Six) Hours As Needed for Nausea or Vomiting.    saline (AYR) gel nasal gel Unknown  No No    Apply 1 Application topically to the appropriate area as directed As Needed (nasal dryness nose bleeds).    sertraline (ZOLOFT) 100 MG tablet 5/11/2025  No Yes    TAKE 1 & 1/2 TABLETS BY MOUTH DAILY    sodium chloride 0.65 % nasal spray Past Week  No Yes    2 sprays into the nostril(s) as directed by provider As Needed (dry nose).    traZODone (DESYREL) 50 MG tablet 5/11/2025  No Yes    Take 0.5 tablets by mouth Every Night.          ED Medications:    Medications   sodium chloride 0.9 % flush 10 mL (has no administration in time  range)   nitroglycerin (NITROSTAT) SL tablet 0.4 mg (has no administration in time range)   sodium chloride 0.9 % flush 10 mL (10 mL Intravenous Given 5/12/25 1211)   sodium chloride 0.9 % flush 10 mL (has no administration in time range)   sodium chloride 0.9 % infusion 40 mL (has no administration in time range)   ondansetron (ZOFRAN) injection 4 mg (has no administration in time range)   Potassium Replacement - Follow Nurse / BPA Driven Protocol (has no administration in time range)   Magnesium Cardiology Dose Replacement - Follow Nurse / BPA Driven Protocol (has no administration in time range)   Phosphorus Replacement - Follow Nurse / BPA Driven Protocol (has no administration in time range)   Calcium Replacement - Follow Nurse / BPA Driven Protocol (has no administration in time range)   acetaminophen (TYLENOL) tablet 650 mg (has no administration in time range)     Or   acetaminophen (TYLENOL) 160 MG/5ML oral solution 650 mg (has no administration in time range)     Or   acetaminophen (TYLENOL) suppository 650 mg (has no administration in time range)   sennosides-docusate (PERICOLACE) 8.6-50 MG per tablet 2 tablet (has no administration in time range)     And   polyethylene glycol (MIRALAX) packet 17 g (has no administration in time range)     And   bisacodyl (DULCOLAX) EC tablet 5 mg (has no administration in time range)     And   bisacodyl (DULCOLAX) suppository 10 mg (has no administration in time range)   multivitamin with minerals 1 tablet (1 tablet Oral Given 5/12/25 1211)   methylPREDNISolone sodium succinate (SOLU-Medrol) injection 62.5 mg (has no administration in time range)   ipratropium-albuterol (DUO-NEB) nebulizer solution 3 mL (has no administration in time range)   arformoterol (BROVANA) nebulizer solution 15 mcg (has no administration in time range)     And   budesonide (PULMICORT) nebulizer solution 0.5 mg (has no administration in time range)     And   revefenacin (YUPELRI) nebulizer  "solution 175 mcg (has no administration in time range)   piperacillin-tazobactam (ZOSYN) IVPB 3.375 g IVPB in 100 mL NS (VTB) (has no administration in time range)   piperacillin-tazobactam (ZOSYN) IVPB 3.375 g IVPB in 100 mL NS (VTB) (has no administration in time range)   guaiFENesin (MUCINEX) 12 hr tablet 600 mg (has no administration in time range)   iopamidol (ISOVUE-300) 61 % injection 85 mL (85 mL Intravenous Given 5/12/25 0651)   diazePAM (VALIUM) tablet 5 mg (5 mg Oral Given 5/12/25 0720)     Vital Signs:  Temp:  [98.5 °F (36.9 °C)-98.9 °F (37.2 °C)] 98.9 °F (37.2 °C)  Heart Rate:  [65-73] 65  Resp:  [16-19] 16  BP: (140-151)/(59-94) 142/59        05/12/25  0536 05/12/25  1031   Weight: 59 kg (130 lb) 62 kg (136 lb 11 oz)     Body mass index is 22.75 kg/m².    Physical Exam:     Most recent vital Signs: /59 (BP Location: Left arm, Patient Position: Lying)   Pulse 65   Temp 98.9 °F (37.2 °C) (Axillary)   Resp 16   Ht 165.1 cm (65\")   Wt 62 kg (136 lb 11 oz)   SpO2 98%   BMI 22.75 kg/m²     Physical Exam  Constitutional: Awake, alert  Eyes: PERRLA, sclerae anicteric, no conjunctival injection  HENT: NCAT, mucous membranes moist  Neck: Supple, no thyromegaly, no lymphadenopathy, trachea midline  Respiratory: Poor breath sounds in all lung fields, scattered wheezing, worse at the apices.  Conversationally dyspneic.    Cardiovascular: RRR, no murmurs, rubs, or gallops, palpable pedal pulses bilaterally  Gastrointestinal: Positive bowel sounds, soft, nontender, nondistended  Musculoskeletal: No bilateral ankle edema, no clubbing or cyanosis to extremities  Psychiatric: Appropriate affect, cooperative  Neurologic: Oriented x2, strength symmetric in all extremities, Cranial Nerves grossly intact to confrontation, speech clear.  Skin: No rashes     Laboratory data:    I have reviewed the labs done in the emergency room.    Results from last 7 days   Lab Units 05/12/25  0540 05/06/25  0540   SODIUM " mmol/L 148* 141   POTASSIUM mmol/L 4.4 4.2   CHLORIDE mmol/L 101 96*   CO2 mmol/L 46.3* 43.6*   BUN mg/dL 24* 34*   CREATININE mg/dL 0.58 0.59   CALCIUM mg/dL 8.6 8.9   BILIRUBIN mg/dL 0.2  --    ALK PHOS U/L 107  --    ALT (SGPT) U/L 30  --    AST (SGOT) U/L 20  --    GLUCOSE mg/dL 105* 119*     Results from last 7 days   Lab Units 05/12/25  0540 05/06/25  0540   WBC 10*3/mm3 5.67 8.80   HEMOGLOBIN g/dL 9.1* 9.2*   HEMATOCRIT % 32.4* 31.6*   PLATELETS 10*3/mm3 166 196     Results from last 7 days   Lab Units 05/12/25  0540   INR  1.13*     Results from last 7 days   Lab Units 05/12/25  0814 05/12/25  0540   HSTROP T ng/L 17* 17*                 Results from last 7 days   Lab Units 05/12/25  0558   PH, ARTERIAL pH units 7.410   PO2 ART mm Hg 57.6*   PCO2, ARTERIAL mm Hg 74.5*   HCO3 ART mmol/L 47.2*     Results from last 7 days   Lab Units 05/12/25  0545   COLOR UA  Yellow   GLUCOSE UA  Negative   KETONES UA  Negative   BLOOD UA  Negative   LEUKOCYTES UA  Negative   PH, URINE  7.0   BILIRUBIN UA  Negative   UROBILINOGEN UA  0.2 E.U./dL       Pain Management Panel          Latest Ref Rng & Units 8/11/2023   Pain Management Panel   Creatinine, Urine mg/dL 105.2      Radiology:    XR Chest 1 View  Result Date: 5/12/2025  PROCEDURE: XR CHEST 1 VW-  HISTORY: weakness, shortness of breath  COMPARISON: 5/3/2025  FINDINGS:  Portable view of the chest demonstrates emphysematous changes. No acute pulmonary density is seen. There is no evidence of effusion, pneumothorax or other significant pleural disease. The mediastinum is unremarkable.  The heart size is normal.      No acute findings    This report was signed and finalized on 5/12/2025 8:24 AM by Dillon Veliz MD.      CT Abdomen Pelvis With Contrast  Result Date: 5/12/2025  PROCEDURE: CT ABDOMEN PELVIS W CONTRAST-  TECHNIQUE: IV contrast enhanced exam  HISTORY: Abdominal pain, AMS  COMPARISON: 1/2/2025.  FINDINGS:  ABDOMEN: Small hypodense lesions are seen in the liver  parenchyma compatible with benign cyst. Bilateral renal cysts are noted stable. Right renal cyst is complex with calcified thin septation. Left adrenal mass is stable compatible with benign adenoma. Remaining solid organs are normal. No bowel obstruction is seen. Moderate fecal impaction is noted. Small midline abdominal wall hernia contains transverse colon without obstruction.  PELVIS: Surgical changes are seen of the cecum. Pelvic bowel loops are unremarkable. Bladder has a normal appearance. Patient is status post hysterectomy. Appendix is not visualized but no secondary findings of appendicitis are seen. Chronic severe compression fracture of L3 is noted.      Chronic findings without acute process   This study was performed with techniques to keep radiation doses as low as reasonably achievable (ALARA). Individualized dose reduction techniques using automated exposure control or adjustment of vA and/or kV according to the patient size were employed.  This report was signed and finalized on 5/12/2025 7:57 AM by Dillon Veliz MD.      CT Head Without Contrast  Result Date: 5/12/2025  FINAL REPORT TECHNIQUE: null CLINICAL HISTORY: altered mental status, confusion, weakness COMPARISON: null FINDINGS: CT head without contrast Comparison: CT/SR - CT HEAD WO CONTRAST - 1/2/25 17:57 EST Findings: No intra-axial mass, midline shift, hydrocephalus, or acute hemorrhage. Mild global volume loss with subcortical and periventricular white matter hypodensity suggesting chronic microangiopathy. The visualized paranasal sinuses and mastoid air cells are normal. The orbits are unremarkable. No skull fracture.     IMPRESSION: 1. No acute intracranial findings. Authenticated and Electronically Signed by Larissa Spivey on 05/12/2025 06:52:38 AM      Assessment:    Acute exacerbation of COPD  Acute on chronic hypoxic hypercapnic respiratory failure  Metabolic encephalopathy  Chronic systolic congestive heart failure  Atrial  fibrillation  Hypertension  Hyperlipidemia  Tobacco use disorder  Osteoporosis  Osteoarthritis  Fibromyalgia  Anxiety  Depression  Vitamin D deficiency    Plan:    Acute on chronic hypoxic hypercapnic respiratory failure  Acute exacerbation of COPD  Acute exacerbation of systolic congestive heart failure  Supportive oxygen.  BiPAP if necessary.  IV Zosyn due to failure of outpatient antibiotic therapies.  Patient should be continued on PO antibiotics/steroid taper at discharge to complete 7-10 day course of Abx.  DuoNebs, Solu-Medrol, Mucinex  Brovana, Pulmicort, Yupelri  Flutter valve.  Chronic systolic congestive heart failure  Atrial fibrillation  Resume Eliquis, amiodarone, metoprolol  Hypertension  Hyperlipidemia  Tobacco use disorder  Osteoporosis  Osteoarthritis  Fibromyalgia  Anxiety  Depression  Vitamin D deficiency  Resume home medications as appropriate     Risk Assessment: High  DVT Prophylaxis: Eliquis  Code Status: Full code  Diet: Consistent carb, cardiac      Ramses Chaudhari DO  05/12/25  13:13 EDT    Dictated utilizing Dragon dictation.

## 2025-05-12 NOTE — CASE MANAGEMENT/SOCIAL WORK
Discharge Planning Assessment   Jean     Patient Name: Gabi Dudley  MRN: [id number]  Today's Date: 5/12/2025    Admit Date: 5/12/2025    Plan: Plan for patient to return home with daughter and HH; [facility] services   Discharge Needs Assessment       Row Name 05/12/25 1023       Living Environment    People in Home child(ashely), adult    Name(s) of People in Home Denise Stanley    Current Living Arrangements home    Duration at Residence Since 2010    Potentially Unsafe Housing Conditions patient unable to answer    In the past 12 months has the electric, gas, oil, or water company threatened to shut off services in your home? Pt Unable    Primary Care Provided by self;child(ashely)    Provides Primary Care For no one, unable/limited ability to care for self    Family Caregiver if Needed none    Quality of Family Relationships helpful;involved;supportive    Able to Return to Prior Arrangements yes       Resource/Environmental Concerns    Resource/Environmental Concerns none    Transportation Concerns none       Transportation Needs    In the past 12 months, has lack of transportation kept you from medical appointments or from getting medications? Pt Unable    In the past 12 months, has lack of transportation kept you from meetings, work, or from getting things needed for daily living? Pt Unable       Food Insecurity    Within the past 12 months, you worried that your food would run out before you got the money to buy more. Pt Unable    Within the past 12 months, the food you bought just didn't last and you didn't have money to get more. Pt Unable       Transition Planning    Patient/Family Anticipates Transition to home with help/services    Patient/Family Anticipated Services at Transition home health care    Transportation Anticipated family or friend will provide       Discharge Needs Assessment    Readmission Within the Last 30 Days current reason for admission unrelated to previous  admission    Current Outpatient/Agency/Support Group homecare agency  Patient has not had her first home visit from [facility] yet    Equipment Currently Used at Home wheelchair;rollator;bipap;oxygen    Concerns to be Addressed discharge planning    Do you want help finding or keeping work or a job? Patient unable to answer    Do you want help with school or training? For example, starting or completing job training or getting a high school diploma, GED or equivalent Patient unable to answer    Anticipated Changes Related to Illness inability to care for self    Equipment Needed After Discharge none    Outpatient/Agency/Support Group Needs homecare agency    Discharge Facility/Level of Care Needs home with home health    Provided Post Acute Provider List? N/A    Provided Post Acute Provider Quality & Resource List? N/A    Offered/Gave Vendor List no    Patient's Choice of Community Agency(s) [facility]    Current Discharge Risk chronically ill                   Discharge Plan       Row Name 05/12/25 1024       Plan    Plan Plan for patient to return home with daughter and ; [facility] for  services    Patient/Family in Agreement with Plan yes    Provided Post Acute Provider List? N/A    Provided Post Acute Provider Quality & Resource List? N/A    Plan Comments Patient is currently confused and unable to answer questions; her daughterMalu is at bedside and is able to assist with this assessment.  She is a current patient of Dr. Saunders and gets her medications from [facility] and [facility]; daughter elects to enroll patient in [facility].  She uses the following DME: rollator, wheelchair, oxygen ([employer]), C-pap ([facility]).  She is also a new patient of [facility], however, she has not had her first home visit yet.  She also receives care for [facility] and Outpatient case management through her PCP. Daughter denies need for additional DME or services at [facility].  At the time  of SD the plan is for the patient to return home with daughter and HH.  Questions and concerns were addressed, IMM delivered, and 30 day readmission assessment completed with daughter at the time of this conversation. Will provide additional resources and information upon request.    Final Note Plan to DC home with daughter and HH                  Selected Continued Care - Episodes Includes continued care and service providers with selected services from the active episodes listed below          Selected Continued Care - Prior Encounters Includes continued care and service providers with selected services from prior encounters from 2/11/2025 to 5/12/2025      Discharged on 5/6/2025 Admission date: 5/3/2025 - [place] [facility] [place]       Service Provider Services Address Phone Fax Patient Preferred    [facility] [address], [address] [address] [phone] --                             Demographic Summary       Row Name 05/12/25 1018       General Information    Admission Type inpatient    Arrived From emergency department    Required Notices Provided Important Message from Medicare    Referral Source admission list    Reason for Consult discharge planning    Preferred Language English       Contact Information    Permission Granted to Share Info With other (see comments)  Patient is currently confused and unable to answer questions; her daughterMalu is at bedside and is able to assist with this assessment.    Contact Information Comments Patient is currently confused and unable to answer questions; her daughterMalu is at bedside and is able to assist with this assessment.                   Functional Status       Row Name 05/12/25 1019       Functional Status    Usual Activity Tolerance good    Current Activity Tolerance poor       Physical Activity    On average, how  many days per week do you engage in moderate to strenuous exercise (like a brisk walk)? Pt Unable    On average, how many minutes do you engage in exercise at this level? Pt Unable       Assessment of Health Literacy    How often do you have someone help you read hospital materials? --  Patient is currently confused and unable to answer questions; her daughter, Malu is at bedside and is able to assist with this assessment.       Functional Status, IADL    Medications assistive person    Meal Preparation assistive person    Housekeeping assistive person    Laundry assistive person    Shopping assistive person    If for any reason you need help with day-to-day activities such as bathing, preparing meals, shopping, managing finances, etc., do you get the help you need? Patient unable to answer       Mental Status    General Appearance WDL WDL       Mental Status Summary    Recent Changes in Mental Status/Cognitive Functioning unable to assess    Mental Status Comments Patient is currently confused and unable to answer questions; her daughter, Malu is at bedside and is able to assist with this assessment.                   Psychosocial    No documentation.                  Abuse/Neglect       Row Name 05/12/25 1020       Personal Safety    Feels Unsafe at Home or Work/School unable to answer (comment required)    Feels Threatened by Someone unable to answer (comment required)    Does Anyone Try to Keep You From Having Contact with Others or Doing Things Outside Your Home? unable to answer (comment required)    Physical Signs of Abuse Present patient unable to answer                   Legal       Row Name 05/12/25 1020       Financial Resource Strain    How hard is it for you to pay for the very basics like food, housing, medical care, and heating? Pt Unable                   Substance Abuse       Row Name 05/12/25 1020       Substance Use    Substance Use Status never used                   Patient Forms        Row Name 05/12/25 1028       Patient Forms    Important Message from Medicare (IMM) Delivered    Delivered to Support person  Malu Aguilar, Daughter    Method of delivery In person                      Zara Huang RN

## 2025-05-12 NOTE — PLAN OF CARE
Goal Outcome Evaluation:  Plan of Care Reviewed With: patient        Progress: no change  Outcome Evaluation: Pt participated in the OT evaluation. She was received in the bed and slightly drowsy prior to mobilizing. She was on 4L nc and oriented x4 with cueing. Pt reports living with her daughter and granddaughter in a multi-lvl home with a ramped entry. She states she is able to live on the primary lvl and ambulates household distances with a rollator and requires min A from her daughter for ADLs occassionally. On assessment she moved wot ob with CGA. She was able to transfer to standing with min A using the Rwx and ambulated 4ft to the BSC with min A and the Rwx. Pt completed toileting in standing with CGA and Rwx balance support with increased time and she was able to ambulate to the chair x6ft with CGA. She was positioned for comfort with all needs in reach and the chair alarm activated. Recommend continued skilled OT services to address ADL, functional mobility, strength, endurance and activity tolerance training. Recommend she dc home with family assistance and HH PT/OT when medically ready.    Anticipated Discharge Disposition (OT): home with assist, home with home health

## 2025-05-12 NOTE — ED PROVIDER NOTES
[facility]  Emergency Department Encounter  Emergency Medicine Physician Note       Pt Name: Gabi Dudley  MRN: [id number]  Pt :   1945  Room Number:    Date of encounter:  2025  PCP: Krystina Saunders DO  ED Physician: Ricardo Read DO    HPI:  Gabi Dudley is a 80 y.o. female who presents to the ED with chief complaint of altered mental status.  Patient is accompanied by her daughter who contributes to HPI.  Reports patient was in normal mental status last night when she went to bed.  Woke her up just prior to arrival screaming and moaning.  Patient noted to be altered and confused.  EMS was called.  Patient brought to the ED for evaluation.  Upon arrival, patient remains confused.  Unable to contribute to HPI. Patient normally alert and orient x 4. Daughter reports that patient did fall back onto her bed this morning, but denies any injuries.  Reports patient was just discharged in the hospital for COPD exacerbation.    Pertinent medical history: CHF with decreased EF, COPD, chronic respiratory failure with hypoxia on baseline 4 L nasal cannula    HPI limited secondary to mental status.    PAST MEDICAL HISTORY  Past Medical History:   Diagnosis Date    Adrenal adenoma     Anemia     Arrhythmia     Asthma     Atrial fibrillation     Back pain     Benign colonic polyp     Benign tumor of adrenal gland     Cataract     bilateral    Cholelithiasis     Chronic bronchitis     Chronic bronchitis with COPD (chronic obstructive pulmonary disease)     COPD (chronic obstructive pulmonary disease)     Coronary artery disease     Depression     Diverticulosis Years ago    Elevated cholesterol     Environmental and seasonal allergies     Fibromyalgia     Fibromyalgia, primary Sometime in the s    Gastritis     Generalized anxiety disorder     GERD (gastroesophageal reflux disease)     H/O mammogram     Headache Years ago    Hemorrhoids     History of blood transfusion      History of blood transfusion 1985    History of echocardiogram     History of endometriosis     History of nuclear stress test     Hospitalization or health care facility admission within last 6 months     5 times between 11/2022-05/2023    Hypertension     IBS (irritable bowel syndrome)     Impaired functional mobility, balance, gait, and endurance     Impaired mobility     Inverted nipple     Kidney disease     Kidney stone     Liver cyst     Low back pain Years ago    Nodular radiologic density     Nodule of left lung     NSTEMI (non-ST elevated myocardial infarction) 9/24/2024    On home oxygen therapy     2 liters NC QHS    Osteoarthritis     Osteopenia Several years ago    Osteoporosis     PONV (postoperative nausea and vomiting)     Renal cyst     Sinus problem     2014    Sinusitis     Skin cancer     basal cell carcinoma    SOB (shortness of breath)     Tobacco use     Urinary frequency     Urinary tract infection Years ago    Frequent UTIs    Vitamin D deficiency     Wears glasses     Wears partial dentures     upper plate     Current Outpatient Medications   Medication Instructions    albuterol (PROVENTIL) 2.5 mg, Nebulization, Every 4 Hours PRN    albuterol sulfate  (90 Base) MCG/ACT inhaler 2 puffs, Inhalation, Every 4 Hours PRN    amiodarone (PACERONE) 200 mg, Oral, Every 24 Hours Scheduled    apixaban (ELIQUIS) 5 mg, Oral, Every 12 Hours Scheduled    atorvastatin (LIPITOR) 40 mg, Oral, Daily    benzonatate (TESSALON) 100 mg, Oral, 3 Times Daily PRN    Budeson-Glycopyrrol-Formoterol (Breztri Aerosphere) 160-9-4.8 MCG/ACT aerosol inhaler 2 puffs, Inhalation, 2 Times Daily, Rinse mouth out after use    clotrimazole-betamethasone (Lotrisone) 1-0.05 % cream 1 Application, Topical, 2 Times Daily    diazePAM (VALIUM) 5 mg, Oral, Every 8 Hours PRN    Diclofenac Sodium (VOLTAREN) 4 g, Topical, 4 Times Daily PRN    DULoxetine (CYMBALTA) 60 mg, Oral, 2 Times Daily    fluconazole (DIFLUCAN) 100 mg, Oral,  Daily    fluticasone (FLONASE) 50 MCG/ACT nasal spray 2 sprays, Nasal, Daily    furosemide (LASIX) 20 mg, Oral, Daily PRN    ipratropium-albuterol (DUO-NEB) 0.5-2.5 mg/3 ml nebulizer USE 3 mL VIA NEBULIZER EVERY 4 HOURS AS NEEDED FOR WHEEZING    meclizine (ANTIVERT) 25 mg, Oral, 3 Times Daily PRN    metoprolol succinate XL (TOPROL-XL) 50 mg, Oral, Every 24 Hours Scheduled    naloxone (NARCAN) 4 mg    O2 (OXYGEN) 2 L/min, As Needed    ondansetron ODT (ZOFRAN-ODT) 8 mg, Translingual, Every 8 Hours PRN    oxyCODONE-acetaminophen (PERCOCET) 7.5-325 MG per tablet 1 tablet, Oral, Every 4 Hours PRN    pantoprazole (PROTONIX) 40 mg, Oral, Daily    prochlorperazine (COMPAZINE) 5 mg, Oral, Every 6 Hours PRN    saline (AYR) gel nasal gel 1 Application, Topical, As Needed    sertraline (ZOLOFT) 100 MG tablet TAKE 1 & 1/2 TABLETS BY MOUTH DAILY    sodium chloride 0.65 % nasal spray 2 sprays, Nasal, As Needed    traZODone (DESYREL) 25 mg, Oral, Nightly      PAST SURGICAL HISTORY  Past Surgical History:   Procedure Laterality Date    APPENDECTOMY  1980s    CARDIAC CATHETERIZATION  2003    CATARACT EXTRACTION  2013    both eyes    CHOLECYSTECTOMY  2020    CHOLECYSTECTOMY WITH INTRAOPERATIVE CHOLANGIOGRAM N/A 10/30/2020    Procedure: CHOLECYSTECTOMY LAPAROSCOPIC INTRAOPERATIVE CHOLANGIOGRAPHY;  Surgeon: Sima Moses MD;  Location: [facility] OR;  Service: General;  Laterality: N/A;    COLON SURGERY  2020    COLONOSCOPY  03/11/2013    COLONOSCOPY  06/21/2016    COLONOSCOPY N/A 07/24/2019    Procedure: COLONOSCOPY W/ COLD FORCEP POLYPECTOMIES; HOT SNARE POLYPECTOMIES; COLD SNARE POLYPECTOMY;  Surgeon: Goyo Nunez MD;  Location: [facility] ENDOSCOPY;  Service: Gastroenterology    COLONOSCOPY W/ BIOPSIES AND POLYPECTOMY      EXPLORATORY LAPAROTOMY N/A 11/23/2020    Procedure: colectomy, right, closure of enterotomy x 2, reduction of internal volvulus;  Surgeon: Sima Moses MD;  Location: [facility] OR;  Service: General;  Laterality:  N/A;    EXPLORATORY LAPAROTOMY N/A 2023    Procedure: LAPAROTOMY EXPLORATORY WITH LYIS OF ADHESIONS AND  CENTRAL LINE INSERTION;  Surgeon: Bianca Isaac DO;  Location: [facility] OR;  Service: General;  Laterality: N/A;    HYSTERECTOMY      partial    LYSIS OF ABDOMINAL ADHESIONS      TONSILLECTOMY      UPPER GASTROINTESTINAL ENDOSCOPY  2015    VAGINAL DELIVERY      x2       FAMILY HISTORY  Family History   Problem Relation Age of Onset    Stomach cancer Brother     Colon cancer Maternal Aunt     Brain cancer Father     Arthritis Father     Hypertension Father     Cancer Father         Father, brother, and aunt    Lung cancer Paternal Grandfather     Heart disease Mother         Mother passed away due to heart disease    Breast cancer Neg Hx     Ovarian cancer Neg Hx        SOCIAL HISTORY  Social History     Socioeconomic History    Marital status:    Tobacco Use    Smoking status: Former     Current packs/day: 0.00     Average packs/day: 0.3 packs/day for 30.0 years (7.5 ttl pk-yrs)     Types: Cigarettes     Start date: 1990     Quit date: 2020     Years since quittin.5     Passive exposure: Past    Smokeless tobacco: Never   Vaping Use    Vaping status: Never Used   Substance and Sexual Activity    Alcohol use: No    Drug use: No    Sexual activity: Not Currently     Birth control/protection: Post-menopausal     ALLERGIES  Ativan [lorazepam] and Doxycycline    REVIEW OF SYSTEMS  All systems reviewed and negative except for those discussed in HPI.     PHYSICAL EXAM  ED Triage Vitals [25 0536]   Temp Heart Rate Resp BP SpO2   98.5 °F (36.9 °C) 73 19 140/94 94 %      Temp src Heart Rate Source Patient Position BP Location FiO2 (%)   Axillary Monitor Sitting Left arm --     I have reviewed the triage vital signs and nursing notes.    General: Alert.  Appears chronically ill, but nontoxic.  No acute distress.  Head: Normocephalic.  Atraumatic.  Eyes: No scleral  icterus.  ENT: Moist mucous membranes.  Cardiovascular: Regular rate and rhythm.  No murmurs.  No rubs.  2+ distal pulses bilaterally.  Respiratory: No wheezing. No rales.  No rhonchi.  GI: Abdomen is soft.  Nondistended.  + Generalized tenderness to palpation.  No rebound.  No guarding.  No CVA tenderness.  MSK: Moves all 4 extremities.  Neurologic: Confused.  No focal deficits.  Moves all 4 extremities.  Skin: No edema. No erythema. No pallor. No cyanosis.  No signs of external trauma.    LAB RESULTS  Recent Results (from the past 24 hours)   Comprehensive Metabolic Panel    Collection Time: 05/12/25  5:40 AM    Specimen: Blood   Result Value Ref Range    Glucose 105 (H) 65 - 99 mg/dL    BUN 24 (H) 8 - 23 mg/dL    Creatinine 0.58 0.57 - 1.00 mg/dL    Sodium 148 (H) 136 - 145 mmol/L    Potassium 4.4 3.5 - 5.2 mmol/L    Chloride 101 98 - 107 mmol/L    CO2 46.3 (H) 22.0 - 29.0 mmol/L    Calcium 8.6 8.6 - 10.5 mg/dL    Total Protein 5.6 (L) 6.0 - 8.5 g/dL    Albumin 3.4 (L) 3.5 - 5.2 g/dL    ALT (SGPT) 30 1 - 33 U/L    AST (SGOT) 20 1 - 32 U/L    Alkaline Phosphatase 107 39 - 117 U/L    Total Bilirubin 0.2 0.0 - 1.2 mg/dL    Globulin 2.2 gm/dL    A/G Ratio 1.5 g/dL    BUN/Creatinine Ratio 41.4 (H) 7.0 - 25.0    Anion Gap 0.7 (L) 5.0 - 15.0 mmol/L    eGFR 92.8 >60.0 mL/min/1.73   Protime-INR    Collection Time: 05/12/25  5:40 AM    Specimen: Blood   Result Value Ref Range    Protime 15.3 (H) 12.3 - 15.1 Seconds    INR 1.13 (H) 0.90 - 1.10   aPTT    Collection Time: 05/12/25  5:40 AM    Specimen: Blood   Result Value Ref Range    PTT 28.1 (L) 70.0 - 100.0 seconds   High Sensitivity Troponin T    Collection Time: 05/12/25  5:40 AM    Specimen: Blood   Result Value Ref Range    HS Troponin T 17 (H) <14 ng/L   C-reactive Protein    Collection Time: 05/12/25  5:40 AM    Specimen: Blood   Result Value Ref Range    C-Reactive Protein 3.48 (H) 0.00 - 0.50 mg/dL   Ammonia    Collection Time: 05/12/25  5:40 AM    Specimen: Blood    Result Value Ref Range    Ammonia 24 11 - 51 umol/L   Magnesium    Collection Time: 05/12/25  5:40 AM    Specimen: Blood   Result Value Ref Range    Magnesium 2.1 1.6 - 2.4 mg/dL   CBC Auto Differential    Collection Time: 05/12/25  5:40 AM    Specimen: Blood   Result Value Ref Range    WBC 5.67 3.40 - 10.80 10*3/mm3    RBC 3.43 (L) 3.77 - 5.28 10*6/mm3    Hemoglobin 9.1 (L) 12.0 - 15.9 g/dL    Hematocrit 32.4 (L) 34.0 - 46.6 %    MCV 94.5 79.0 - 97.0 fL    MCH 26.5 (L) 26.6 - 33.0 pg    MCHC 28.1 (L) 31.5 - 35.7 g/dL    RDW 15.4 12.3 - 15.4 %    RDW-SD 53.1 37.0 - 54.0 fl    MPV 10.9 6.0 - 12.0 fL    Platelets 166 140 - 450 10*3/mm3    Neutrophil % 62.9 42.7 - 76.0 %    Lymphocyte % 24.7 19.6 - 45.3 %    Monocyte % 9.0 5.0 - 12.0 %    Eosinophil % 2.5 0.3 - 6.2 %    Basophil % 0.2 0.0 - 1.5 %    Immature Grans % 0.7 (H) 0.0 - 0.5 %    Neutrophils, Absolute 3.57 1.70 - 7.00 10*3/mm3    Lymphocytes, Absolute 1.40 0.70 - 3.10 10*3/mm3    Monocytes, Absolute 0.51 0.10 - 0.90 10*3/mm3    Eosinophils, Absolute 0.14 0.00 - 0.40 10*3/mm3    Basophils, Absolute 0.01 0.00 - 0.20 10*3/mm3    Immature Grans, Absolute 0.04 0.00 - 0.05 10*3/mm3    nRBC 0.0 0.0 - 0.2 /100 WBC   Scan Slide    Collection Time: 05/12/25  5:40 AM    Specimen: Blood   Result Value Ref Range    RBC Morphology Normal Normal    WBC Morphology Normal Normal    Platelet Estimate Adequate Normal   TSH Rfx On Abnormal To Free T4    Collection Time: 05/12/25  5:40 AM    Specimen: Blood   Result Value Ref Range    TSH 7.510 (H) 0.270 - 4.200 uIU/mL   T4, Free    Collection Time: 05/12/25  5:40 AM    Specimen: Blood   Result Value Ref Range    Free T4 1.24 0.92 - 1.68 ng/dL   Urinalysis With Microscopic If Indicated (No Culture) - Straight Cath    Collection Time: 05/12/25  5:45 AM    Specimen: Straight Cath; Urine   Result Value Ref Range    Color, UA Yellow Yellow, Straw    Appearance, UA Clear Clear    pH, UA 7.0 5.0 - 8.0    Specific Gravity, UA 1.017  1.005 - 1.030    Glucose, UA Negative Negative    Ketones, UA Negative Negative    Bilirubin, UA Negative Negative    Blood, UA Negative Negative    Protein, UA Negative Negative    Leuk Esterase, UA Negative Negative    Nitrite, UA Negative Negative    Urobilinogen, UA 0.2 E.U./dL 0.2 - 1.0 E.U./dL   Blood Gas, Arterial With Co-Ox    Collection Time: 05/12/25  5:58 AM    Specimen: Arterial Blood   Result Value Ref Range    Site Right Radial     Glenn's Test Positive     pH, Arterial 7.410 7.300 - 7.500 pH units    pCO2, Arterial 74.5 (C) 35.0 - 45.0 mm Hg    pO2, Arterial 57.6 (L) 75.0 - 100.0 mm Hg    HCO3, Arterial 47.2 (H) 22.0 - 28.0 mmol/L    Base Excess, Arterial 19.7 (H) 0.0 - 2.0 mmol/L    O2 Saturation, Arterial 92.4 (L) 94.0 - 100.0 %    Hematocrit, Blood Gas 28.4 %    Oxyhemoglobin 91.2 (L) 94 - 99 %    Methemoglobin 0.10 0.00 - 1.50 %    Carboxyhemoglobin 1.2 0 - 2 %    A-a DO2 79.6 mmHg    Barometric Pressure for Blood Gas 734 mmHg    Modality N/A     FIO2 32 %    Flow Rate 3.0 lpm    Ventilator Mode NA     Notified Who MD     Notified By [id number]     Notified Time 05/12/2025 05:58     Collected by      pH, Temp Corrected      pCO2, Temperature Corrected      pO2, Temperature Corrected     High Sensitivity Troponin T 1Hr    Collection Time: 05/12/25  8:14 AM    Specimen: Blood   Result Value Ref Range    HS Troponin T 17 (H) <14 ng/L    Troponin T Numeric Delta 0 ng/L    Troponin T % Delta 0 Abnormal if >/= 20%       RADIOLOGY  XR Chest 1 View  Result Date: 5/12/2025  PROCEDURE: XR CHEST 1 VW-  HISTORY: weakness, shortness of breath  COMPARISON: 5/3/2025  FINDINGS:  Portable view of the chest demonstrates emphysematous changes. No acute pulmonary density is seen. There is no evidence of effusion, pneumothorax or other significant pleural disease. The mediastinum is unremarkable.  The heart size is normal.      No acute findings    This report was signed and finalized on 5/12/2025 8:24 AM by Dillon  MD Keren.      CT Abdomen Pelvis With Contrast  Result Date: 5/12/2025  PROCEDURE: CT ABDOMEN PELVIS W CONTRAST-  TECHNIQUE: IV contrast enhanced exam  HISTORY: Abdominal pain, AMS  COMPARISON: 1/2/2025.  FINDINGS:  ABDOMEN: Small hypodense lesions are seen in the liver parenchyma compatible with benign cyst. Bilateral renal cysts are noted stable. Right renal cyst is complex with calcified thin septation. Left adrenal mass is stable compatible with benign adenoma. Remaining solid organs are normal. No bowel obstruction is seen. Moderate fecal impaction is noted. Small midline abdominal wall hernia contains transverse colon without obstruction.  PELVIS: Surgical changes are seen of the cecum. Pelvic bowel loops are unremarkable. Bladder has a normal appearance. Patient is status post hysterectomy. Appendix is not visualized but no secondary findings of appendicitis are seen. Chronic severe compression fracture of L3 is noted.      Chronic findings without acute process   This study was performed with techniques to keep radiation doses as low as reasonably achievable (ALARA). Individualized dose reduction techniques using automated exposure control or adjustment of vA and/or kV according to the patient size were employed.  This report was signed and finalized on 5/12/2025 7:57 AM by Dillon Veliz MD.      CT Head Without Contrast  Result Date: 5/12/2025  FINAL REPORT TECHNIQUE: null CLINICAL HISTORY: altered mental status, confusion, weakness COMPARISON: null FINDINGS: CT head without contrast Comparison: CT/SR - CT HEAD WO CONTRAST - 1/2/25 17:57 EST Findings: No intra-axial mass, midline shift, hydrocephalus, or acute hemorrhage. Mild global volume loss with subcortical and periventricular white matter hypodensity suggesting chronic microangiopathy. The visualized paranasal sinuses and mastoid air cells are normal. The orbits are unremarkable. No skull fracture.     IMPRESSION: 1. No acute intracranial findings.  Authenticated and Electronically Signed by Larissa Spivey on 05/12/2025 06:52:38 AM      PROCEDURES  Procedures    RISK STRATIFICATION    MEDICAL DECISION MAKING  80 y.o. female with past medical history listed above who presents with altered mental status.    Patient arrives via EMS.  I have reviewed the EMS documentation/notes and included that information in my HPI.    Vital signs within normal limits.  Pulse ox 94% on baseline oxygen.  Afebrile.    Based on clinical presentation and physical exam, differential diagnosis includes, but is not limited to, metabolic versus toxic encephalopathy, hypercapnic respiratory failure, intracranial structural normality, UTI, pneumonia, intra-abdominal process.    At least 3 different tests have been ordered on this patient.    Medications administered in ED:  Medications   sodium chloride 0.9 % flush 10 mL (has no administration in time range)   iopamidol (ISOVUE-300) 61 % injection 85 mL (85 mL Intravenous Given 5/12/25 0651)   diazePAM (VALIUM) tablet 5 mg (5 mg Oral Given 5/12/25 0720)     Please see ED course below for my interpretation of the ED workup.  ED Course as of 05/12/25 0934   Mon May 12, 2025   0612 ECG 12 Lead Altered Mental Status  EKG per my interpretation normal sinus rhythm, rate 71, normal axis, no STEMI, normal QRS QTC intervals.   [JS]   0703 Informed by nursing staff that patient extremely anxious and paranoid after returning from CT scan. Patient re-evaluated. Vital signs stable. Daughter states that she takes Valium at home and is requesting dose here.    I have discussed the indication, risk, and alternatives of the following high risk medications: Oral Valium   [JS]   0817 I reviewed the labs listed above. Clinically unremarkable.    Notable findings are highlighted below.    Old laboratory data was reviewed from the medical records and compared to today's results.   [JS]   0817 Hemoglobin(!): 9.1 [JS]   0817 Sodium(!): 148 [JS]   0817 C-Reactive  Protein(!): 3.48 [JS]   0817 HS Troponin T(!): 17 [JS]   0817 TSH Baseline(!): 7.510 [JS]   0817 Blood Gas, Arterial With Co-Ox(!!) [JS]   0817 pH, Arterial: 7.410 [JS]   0817 pCO2, Arterial(!!): 74.5 [JS]   0817 I have independently reviewed and interpreted the imaging listed above.  My interpretations are listed below:    - CT head negative for intracranial hemorrhage or mass effect.    - CT abdomen and pelvis negative for small bowel obstruction.    - Chest x-ray negative for infiltrate.     [JS]   0908 HS Troponin T(!): 17 [JS]   0908 Troponin T Numeric Delta: 0 [JS]   0933 On re-evaluation, patient resting comfortably.  Vital signs remained stable on supplemental oxygen. Will proceed with medical admission for further workup and management.  I discussed the findings of the ED workup with the patient's daughter including my recommendation for admission.  Daughter agreeable with plan and disposition.    Case discussed with Dr. Brewer (hospitalist) who agrees to evaluate and admit the patient.  We discussed the HPI, pertinent PMHx, ED course and workup.    Chronic conditions affecting care: COPD    Social determinants of health impacting treatment or disposition: Tobacco abuse   [JS]      ED Course User Index  [JS] Ricardo Read DO     REPEAT VITAL SIGNS  AS OF 09:34 EDT VITALS:  BP - 140/94  HR - 73  TEMP - 98.5 °F (36.9 °C) (Axillary)  O2 SATS - 94%    DIAGNOSIS  Final diagnoses:   Acute encephalopathy     DISPOSITION  ED Disposition       ED Disposition   Decision to Admit    Condition   --    Comment   Level of Care: Telemetry [5]   Diagnosis: Altered mental status [780.97.ICD-9-CM]   Admitting Physician: LASHON BREWER [[id number]]   Attending Physician: LASHON BREWER [[id number]]               Please note that portions of this document were completed with voice recognition software.        Ricardo Read DO  05/12/25 0971

## 2025-05-13 LAB
ANION GAP SERPL CALCULATED.3IONS-SCNC: 2.8 MMOL/L (ref 5–15)
BASOPHILS # BLD AUTO: 0.01 10*3/MM3 (ref 0–0.2)
BASOPHILS NFR BLD AUTO: 0.1 % (ref 0–1.5)
BUN SERPL-MCNC: 20 MG/DL (ref 8–23)
BUN/CREAT SERPL: 43.5 (ref 7–25)
CALCIUM SPEC-SCNC: 8.6 MG/DL (ref 8.6–10.5)
CHLORIDE SERPL-SCNC: 101 MMOL/L (ref 98–107)
CO2 SERPL-SCNC: 39.2 MMOL/L (ref 22–29)
CREAT SERPL-MCNC: 0.46 MG/DL (ref 0.57–1)
DEPRECATED RDW RBC AUTO: 49.1 FL (ref 37–54)
EGFRCR SERPLBLD CKD-EPI 2021: 98.1 ML/MIN/1.73
EOSINOPHIL # BLD AUTO: 0 10*3/MM3 (ref 0–0.4)
EOSINOPHIL NFR BLD AUTO: 0 % (ref 0.3–6.2)
ERYTHROCYTE [DISTWIDTH] IN BLOOD BY AUTOMATED COUNT: 14.9 % (ref 12.3–15.4)
GLUCOSE SERPL-MCNC: 136 MG/DL (ref 65–99)
HCT VFR BLD AUTO: 34.1 % (ref 34–46.6)
HGB BLD-MCNC: 10.1 G/DL (ref 12–15.9)
IMM GRANULOCYTES # BLD AUTO: 0.08 10*3/MM3 (ref 0–0.05)
IMM GRANULOCYTES NFR BLD AUTO: 1.2 % (ref 0–0.5)
LYMPHOCYTES # BLD AUTO: 0.66 10*3/MM3 (ref 0.7–3.1)
LYMPHOCYTES NFR BLD AUTO: 9.7 % (ref 19.6–45.3)
MCH RBC QN AUTO: 26.9 PG (ref 26.6–33)
MCHC RBC AUTO-ENTMCNC: 29.6 G/DL (ref 31.5–35.7)
MCV RBC AUTO: 90.7 FL (ref 79–97)
MONOCYTES # BLD AUTO: 0.19 10*3/MM3 (ref 0.1–0.9)
MONOCYTES NFR BLD AUTO: 2.8 % (ref 5–12)
NEUTROPHILS NFR BLD AUTO: 5.83 10*3/MM3 (ref 1.7–7)
NEUTROPHILS NFR BLD AUTO: 86.2 % (ref 42.7–76)
NRBC BLD AUTO-RTO: 0 /100 WBC (ref 0–0.2)
PLATELET # BLD AUTO: 156 10*3/MM3 (ref 140–450)
PMV BLD AUTO: 10.5 FL (ref 6–12)
POTASSIUM SERPL-SCNC: 4.4 MMOL/L (ref 3.5–5.2)
PROCALCITONIN SERPL-MCNC: 0.07 NG/ML (ref 0–0.25)
RBC # BLD AUTO: 3.76 10*6/MM3 (ref 3.77–5.28)
SODIUM SERPL-SCNC: 143 MMOL/L (ref 136–145)
WBC NRBC COR # BLD AUTO: 6.77 10*3/MM3 (ref 3.4–10.8)

## 2025-05-13 PROCEDURE — 99232 SBSQ HOSP IP/OBS MODERATE 35: CPT | Performed by: NURSE PRACTITIONER

## 2025-05-13 PROCEDURE — 85025 COMPLETE CBC W/AUTO DIFF WBC: CPT | Performed by: FAMILY MEDICINE

## 2025-05-13 PROCEDURE — 94799 UNLISTED PULMONARY SVC/PX: CPT

## 2025-05-13 PROCEDURE — 94660 CPAP INITIATION&MGMT: CPT

## 2025-05-13 PROCEDURE — 99223 1ST HOSP IP/OBS HIGH 75: CPT | Performed by: INTERNAL MEDICINE

## 2025-05-13 PROCEDURE — 80048 BASIC METABOLIC PNL TOTAL CA: CPT | Performed by: FAMILY MEDICINE

## 2025-05-13 PROCEDURE — 25010000002 METHYLPREDNISOLONE PER 80 MG: Performed by: INTERNAL MEDICINE

## 2025-05-13 PROCEDURE — 25010000002 METHYLPREDNISOLONE PER 40 MG: Performed by: NURSE PRACTITIONER

## 2025-05-13 PROCEDURE — 25010000002 METHYLPREDNISOLONE PER 125 MG: Performed by: FAMILY MEDICINE

## 2025-05-13 PROCEDURE — 25010000002 FUROSEMIDE PER 20 MG: Performed by: NURSE PRACTITIONER

## 2025-05-13 PROCEDURE — 94761 N-INVAS EAR/PLS OXIMETRY MLT: CPT

## 2025-05-13 PROCEDURE — 94664 DEMO&/EVAL PT USE INHALER: CPT

## 2025-05-13 PROCEDURE — 25010000002 PIPERACILLIN SOD-TAZOBACTAM PER 1 G: Performed by: FAMILY MEDICINE

## 2025-05-13 PROCEDURE — 63710000001 REVEFENACIN 175 MCG/3ML SOLUTION: Performed by: FAMILY MEDICINE

## 2025-05-13 RX ORDER — BUDESONIDE 0.5 MG/2ML
1 INHALANT ORAL
Status: DISCONTINUED | OUTPATIENT
Start: 2025-05-13 | End: 2025-05-16 | Stop reason: HOSPADM

## 2025-05-13 RX ORDER — ARFORMOTEROL TARTRATE 15 UG/2ML
15 SOLUTION RESPIRATORY (INHALATION)
Status: DISCONTINUED | OUTPATIENT
Start: 2025-05-13 | End: 2025-05-16 | Stop reason: HOSPADM

## 2025-05-13 RX ORDER — METHYLPREDNISOLONE SODIUM SUCCINATE 40 MG/ML
40 INJECTION, POWDER, LYOPHILIZED, FOR SOLUTION INTRAMUSCULAR; INTRAVENOUS DAILY
Status: DISCONTINUED | OUTPATIENT
Start: 2025-05-14 | End: 2025-05-13

## 2025-05-13 RX ORDER — FUROSEMIDE 10 MG/ML
40 INJECTION INTRAMUSCULAR; INTRAVENOUS ONCE
Status: COMPLETED | OUTPATIENT
Start: 2025-05-13 | End: 2025-05-13

## 2025-05-13 RX ORDER — METHYLPREDNISOLONE SODIUM SUCCINATE 40 MG/ML
40 INJECTION, POWDER, LYOPHILIZED, FOR SOLUTION INTRAMUSCULAR; INTRAVENOUS EVERY 12 HOURS
Status: DISCONTINUED | OUTPATIENT
Start: 2025-05-13 | End: 2025-05-15

## 2025-05-13 RX ORDER — METHYLPREDNISOLONE ACETATE 80 MG/ML
80 INJECTION, SUSPENSION INTRA-ARTICULAR; INTRALESIONAL; INTRAMUSCULAR; SOFT TISSUE ONCE
Status: COMPLETED | OUTPATIENT
Start: 2025-05-13 | End: 2025-05-13

## 2025-05-13 RX ORDER — METHYLPREDNISOLONE SODIUM SUCCINATE 40 MG/ML
40 INJECTION, POWDER, LYOPHILIZED, FOR SOLUTION INTRAMUSCULAR; INTRAVENOUS EVERY 8 HOURS
Status: DISCONTINUED | OUTPATIENT
Start: 2025-05-13 | End: 2025-05-13

## 2025-05-13 RX ADMIN — ATORVASTATIN CALCIUM 40 MG: 40 TABLET, FILM COATED ORAL at 08:33

## 2025-05-13 RX ADMIN — Medication 10 ML: at 20:01

## 2025-05-13 RX ADMIN — REVEFENACIN 175 MCG: 175 SOLUTION RESPIRATORY (INHALATION) at 07:16

## 2025-05-13 RX ADMIN — BUDESONIDE 1 MG: 0.5 SUSPENSION RESPIRATORY (INHALATION) at 18:27

## 2025-05-13 RX ADMIN — SERTRALINE 150 MG: 50 TABLET, FILM COATED ORAL at 20:01

## 2025-05-13 RX ADMIN — OXYCODONE HYDROCHLORIDE AND ACETAMINOPHEN 1 TABLET: 7.5; 325 TABLET ORAL at 20:05

## 2025-05-13 RX ADMIN — ARFORMOTEROL TARTRATE 15 MCG: 15 SOLUTION RESPIRATORY (INHALATION) at 07:15

## 2025-05-13 RX ADMIN — METHYLPREDNISOLONE SODIUM SUCCINATE 40 MG: 40 INJECTION, POWDER, FOR SOLUTION INTRAMUSCULAR; INTRAVENOUS at 17:35

## 2025-05-13 RX ADMIN — APIXABAN 5 MG: 5 TABLET, FILM COATED ORAL at 20:01

## 2025-05-13 RX ADMIN — PANTOPRAZOLE SODIUM 40 MG: 40 TABLET, DELAYED RELEASE ORAL at 08:33

## 2025-05-13 RX ADMIN — Medication 10 ML: at 08:33

## 2025-05-13 RX ADMIN — DIAZEPAM 5 MG: 5 TABLET ORAL at 16:42

## 2025-05-13 RX ADMIN — DULOXETINE 60 MG: 30 CAPSULE, DELAYED RELEASE ORAL at 08:32

## 2025-05-13 RX ADMIN — BUDESONIDE 0.5 MG: 0.5 SUSPENSION RESPIRATORY (INHALATION) at 07:15

## 2025-05-13 RX ADMIN — FUROSEMIDE 40 MG: 10 INJECTION, SOLUTION INTRAMUSCULAR; INTRAVENOUS at 16:02

## 2025-05-13 RX ADMIN — PIPERACILLIN AND TAZOBACTAM 3.38 G: 3; .375 INJECTION, POWDER, FOR SOLUTION INTRAVENOUS at 05:02

## 2025-05-13 RX ADMIN — METHYLPREDNISOLONE SODIUM SUCCINATE 62.5 MG: 125 INJECTION, POWDER, FOR SOLUTION INTRAMUSCULAR; INTRAVENOUS at 08:32

## 2025-05-13 RX ADMIN — Medication 1 TABLET: at 08:33

## 2025-05-13 RX ADMIN — DULOXETINE 60 MG: 30 CAPSULE, DELAYED RELEASE ORAL at 20:01

## 2025-05-13 RX ADMIN — GUAIFENESIN 600 MG: 600 TABLET, EXTENDED RELEASE ORAL at 08:32

## 2025-05-13 RX ADMIN — AMIODARONE HYDROCHLORIDE 200 MG: 200 TABLET ORAL at 08:32

## 2025-05-13 RX ADMIN — GUAIFENESIN 600 MG: 600 TABLET, EXTENDED RELEASE ORAL at 20:01

## 2025-05-13 RX ADMIN — IPRATROPIUM BROMIDE AND ALBUTEROL SULFATE 3 ML: 2.5; .5 SOLUTION RESPIRATORY (INHALATION) at 16:53

## 2025-05-13 RX ADMIN — METHYLPREDNISOLONE ACETATE 80 MG: 80 INJECTION, SUSPENSION INTRA-ARTICULAR; INTRALESIONAL; INTRAMUSCULAR; SOFT TISSUE at 17:43

## 2025-05-13 RX ADMIN — ARFORMOTEROL TARTRATE 15 MCG: 15 SOLUTION RESPIRATORY (INHALATION) at 18:27

## 2025-05-13 RX ADMIN — METOPROLOL SUCCINATE 50 MG: 50 TABLET, EXTENDED RELEASE ORAL at 08:33

## 2025-05-13 RX ADMIN — APIXABAN 5 MG: 5 TABLET, FILM COATED ORAL at 08:32

## 2025-05-13 NOTE — CASE MANAGEMENT/SOCIAL WORK
Case Management/Social Work    Patient Name:  Gabi Dudley  YOB: 1945  MRN: [id number]  Admit Date:  5/12/2025        17:19 EDT   Discharge Plan    No documentation.               1210: DCP remains home with daughter and /[place].        Electronically signed by:  Ashlyn Palacio RN  05/13/25 17:19 EDT

## 2025-05-13 NOTE — THERAPY TREATMENT NOTE
OT attempted to see pt for treatment. She was hesitant to want to get up with therapy and became frustrated atfter moving to eob with mention of ADL and exercise participation and pt returned to side-lying and closed her eyes. OT explained benefits of therapy and OT will f/u at a later time as the pt is agreeable to participate.

## 2025-05-13 NOTE — CONSULTS
Date of admission:  5/12/2025    Date of consultation:   May 13, 2025    Requested by:    Hospitalist Service.     PCP: Krystina Saunders DO    Reason:  Acute respiratory failure.  COPD exacerbation.  Noncompliance with NIV device.    History of Present Illness:  80 y.o. female with past medical history significant for severe COPD and issues with compliance with NIV device who was discharged 1 week before being readmitted to the hospital with similar symptoms of shortness of breath, increased cough and phlegm production over the past few days.    The patient says that she started having shortness of breath, cough and phlegm production 2-3 days after she was discharged and also she tried her usual medication including nebulized treatments, her symptoms never completely recovered.  She actually continued to worsen and came to the ER.  She was found to have hypoxic and hypercarbic respiratory failure and was readmitted to the hospital.    The patient does admit to being noncompliant with NIV device but offers the reason as issues with the mask but then also mentions difficulty tolerating the pressure on occasion.  She also mention anxiety.    The patient's symptoms continued to worsen and she was admitted to the hospital and pulmonary Consultation was requested for further recommendations.       Review of System: All other review of systems negative except indicated in HPI     Past Medical History: Pertinent history reviewed, as appropriate. Negative, except noted below or in HPI.  If this history could not be obtained due to a reason, the reason is listed in the HPI.  Past Medical History:   Diagnosis Date    Adrenal adenoma     Anemia     Arrhythmia     Asthma     Atrial fibrillation     Back pain     Benign colonic polyp     Benign tumor of adrenal gland     Cataract     bilateral    Cholelithiasis     Chronic bronchitis     Chronic bronchitis with COPD (chronic obstructive pulmonary disease)     COPD (chronic  obstructive pulmonary disease) 2005    Coronary artery disease     Depression     Diverticulosis Years ago    Elevated cholesterol     Environmental and seasonal allergies     Fibromyalgia     Fibromyalgia, primary Sometime in the 90s    Gastritis     Generalized anxiety disorder     GERD (gastroesophageal reflux disease)     H/O mammogram     Headache Years ago    Hemorrhoids     History of blood transfusion 1985    History of blood transfusion 1985    History of echocardiogram     History of endometriosis     History of nuclear stress test     Hospitalization or health care facility admission within last 6 months     5 times between 11/2022-05/2023    Hypertension     IBS (irritable bowel syndrome)     Impaired functional mobility, balance, gait, and endurance     Impaired mobility     Inverted nipple     Kidney disease     Kidney stone     Liver cyst     Low back pain Years ago    Nodular radiologic density     Nodule of left lung     NSTEMI (non-ST elevated myocardial infarction) 9/24/2024    On home oxygen therapy     2 liters NC QHS    Osteoarthritis     Osteopenia Several years ago    Osteoporosis     PONV (postoperative nausea and vomiting)     Renal cyst     Sinus problem     2014    Sinusitis     Skin cancer     basal cell carcinoma    SOB (shortness of breath)     Tobacco use     Urinary frequency     Urinary tract infection Years ago    Frequent UTIs    Vitamin D deficiency     Wears glasses     Wears partial dentures     upper plate         Past Surgical History: Pertinent history reviewed, as appropriate. Negative, except noted below or in HPI. If this history could not be obtained due to a reason, the reason is listed in the HPI.  Past Surgical History:   Procedure Laterality Date    APPENDECTOMY  1980s    CARDIAC CATHETERIZATION  2003    CATARACT EXTRACTION  2013    both eyes    CHOLECYSTECTOMY  2020    CHOLECYSTECTOMY WITH INTRAOPERATIVE CHOLANGIOGRAM N/A 10/30/2020    Procedure: CHOLECYSTECTOMY  LAPAROSCOPIC INTRAOPERATIVE CHOLANGIOGRAPHY;  Surgeon: Sima Moses MD;  Location: [facility] OR;  Service: General;  Laterality: N/A;    COLON SURGERY  2020    COLONOSCOPY  03/11/2013    COLONOSCOPY  06/21/2016    COLONOSCOPY N/A 07/24/2019    Procedure: COLONOSCOPY W/ COLD FORCEP POLYPECTOMIES; HOT SNARE POLYPECTOMIES; COLD SNARE POLYPECTOMY;  Surgeon: Goyo Nunez MD;  Location: [facility] ENDOSCOPY;  Service: Gastroenterology    COLONOSCOPY W/ BIOPSIES AND POLYPECTOMY      EXPLORATORY LAPAROTOMY N/A 11/23/2020    Procedure: colectomy, right, closure of enterotomy x 2, reduction of internal volvulus;  Surgeon: Sima Moses MD;  Location: [facility] OR;  Service: General;  Laterality: N/A;    EXPLORATORY LAPAROTOMY N/A 8/9/2023    Procedure: LAPAROTOMY EXPLORATORY WITH LYIS OF ADHESIONS AND  CENTRAL LINE INSERTION;  Surgeon: Bianca Isaac DO;  Location: [facility] OR;  Service: General;  Laterality: N/A;    HYSTERECTOMY  1980s    partial    LYSIS OF ABDOMINAL ADHESIONS      TONSILLECTOMY  1987    UPPER GASTROINTESTINAL ENDOSCOPY  01/13/2015    VAGINAL DELIVERY      x2         Family History: Pertinent history reviewed, as appropriate. Negative, except noted below or in HPI. If this history could not be obtained due to a reason, the reason is listed in the HPI.  Family History   Problem Relation Age of Onset    Stomach cancer Brother     Colon cancer Maternal Aunt     Brain cancer Father     Arthritis Father     Hypertension Father     Cancer Father         Father, brother, and aunt    Lung cancer Paternal Grandfather     Heart disease Mother         Mother passed away due to heart disease    Breast cancer Neg Hx     Ovarian cancer Neg Hx          Social History: Pertinent history reviewed, as appropriate. Negative, except noted below or in HPI. If this history could not be obtained due to a reason, the reason is listed in the HPI.  Social History     Socioeconomic History    Marital status:    Tobacco Use  "   Smoking status: Former     Current packs/day: 0.00     Average packs/day: 0.3 packs/day for 30.0 years (7.5 ttl pk-yrs)     Types: Cigarettes     Start date: 1990     Quit date: 2020     Years since quittin.5     Passive exposure: Past    Smokeless tobacco: Never   Vaping Use    Vaping status: Never Used   Substance and Sexual Activity    Alcohol use: No    Drug use: No    Sexual activity: Not Currently     Birth control/protection: Post-menopausal             Physical Exam:  BP (!) 187/76 (BP Location: Left arm, Patient Position: Lying)   Pulse 57   Temp 98.1 °F (36.7 °C) (Oral)   Resp 18   Ht 165.1 cm (65\")   Wt 60.5 kg (133 lb 6.1 oz)   SpO2 98%   BMI 22.20 kg/m²     Constitutional:            Vital signs reviewed                     General: Moderate respiratory distress noted.    Eyes:            Extraocular movement was intact.            Pupils appeared equal            Conjunctiva: Pink     ENT:             Hearing was intact              No nasal erythema noted.              Oropharynx was somewhat dry. No lesions noted.     Neck:             Supple. No JVD noted.              Thyroid gland did not seem to be enlarged    Cardiovascular:              S1 + S2. Regular    Lungs/Respiratory:            Respiratory effort was moderately labored            Hyperresonance to percussion.            Decreased Air Entry Bilaterally with mild wheezing.    Abdomen/GI:            Soft.  Bowel sounds were positive.  No obvious organomegaly.    Musculoskeletal/Extremities:            No edema noted.            No cyanosis noted            No clubbing noted            Gait could not be assessed at this time.    Neurologic:             Awake, alert and oriented x 3.             Able to follow simple commands.    Psychiatric:              Affect appeared fair.              Awake, alert and oriented x 3.    Skin:             No rash noted.             Warm and dry        Labs: Reviewed. Pertinent labs " "were noted.     Results from last 7 days   Lab Units 05/13/25  0623 05/12/25  0540   WBC 10*3/mm3 6.77 5.67   HEMOGLOBIN g/dL 10.1* 9.1*   HEMATOCRIT % 34.1 32.4*   PLATELETS 10*3/mm3 156 166   NEUTROPHIL % % 86.2* 62.9   NEUTROS ABS 10*3/mm3 5.83 3.57   EOSINOPHIL % % 0.0* 2.5   EOS ABS 10*3/mm3 0.00 0.14   LYMPHOCYTE % % 9.7* 24.7   LYMPHS ABS 10*3/mm3 0.66* 1.40       Lab Results   Component Value Date    PROCALCITO 0.07 05/12/2025    PROCALCITO 0.06 04/09/2025    PROCALCITO 0.07 01/02/2025       Lab Results   Component Value Date    CRP 3.48 (H) 05/12/2025    CRP 1.14 (H) 04/09/2025       No results found for: \"SEDRATE\"    Lab Results   Component Value Date    PROBNP 2,052.0 (H) 05/03/2025    PROBNP 2,479.0 (H) 04/09/2025    PROBNP 1,444.0 12/20/2024       Results from last 7 days   Lab Units 05/13/25  0623 05/12/25  0540   SODIUM mmol/L 143 148*   POTASSIUM mmol/L 4.4 4.4   CHLORIDE mmol/L 101 101   CO2 mmol/L 39.2* 46.3*   BUN mg/dL 20 24*   CREATININE mg/dL 0.46* 0.58   CALCIUM mg/dL 8.6 8.6   ANION GAP mmol/L 2.8* 0.7*   BILIRUBIN mg/dL  --  0.2   ALK PHOS U/L  --  107   ALT (SGPT) U/L  --  30   AST (SGOT) U/L  --  20   GLUCOSE mg/dL 136* 105*   TOTAL PROTEIN g/dL  --  5.6*   ALBUMIN g/dL  --  3.4*       Results from last 7 days   Lab Units 05/12/25  0540   MAGNESIUM mg/dL 2.1       Lab Results   Component Value Date    TSH 4.360 (H) 05/12/2025    TSH 7.510 (H) 05/12/2025    TSH 0.633 05/06/2022       Lab Results   Component Value Date    FREET4 1.24 05/12/2025       Lab Results   Component Value Date    INR 1.13 (H) 05/12/2025    INR 0.9 01/03/2025    INR 1.07 11/16/2021       Lab Results   Component Value Date    CKTOTAL 19 (L) 01/02/2025    CKTOTAL 126 11/16/2021    CKTOTAL 121 10/27/2020       No components found for: \"HSTROPT\"    Lab Results   Component Value Date    TROPONINT 17 (H) 05/12/2025    TROPONINT 17 (H) 05/12/2025    TROPONINT 13 05/03/2025       No results found for: \"DDIMER\"    Lab Results " "  Component Value Date    LIPASE 10 (L) 07/22/2024    LIPASE 17 06/24/2024    LIPASE 23 08/07/2023       Brief Urine Lab Results  (Last result in the past 365 days)        Color   Clarity   Blood   Leuk Est   Nitrite   Protein   CREAT   Urine HCG        05/12/25 0545 Yellow   Clear   Negative   Negative   Negative   Negative                     Micro: As of May 13, 2025   No results found for: \"RESPCX\"  No results found for: \"BCIDPCR\"  Lab Results   Component Value Date    BLOODCX No growth at 5 days 01/02/2025    BLOODCX No growth at 5 days 01/02/2025    BLOODCX No growth at 5 days 07/22/2024    BLOODCX No growth at 5 days 07/22/2024     Lab Results   Component Value Date    URINECX No growth 07/24/2024    URINECX 25,000 CFU/mL Mixed Doris Isolated 07/10/2024    URINECX Final report 01/09/2024     No results found for: \"MRSACX\"  Lab Results   Component Value Date    MRSAPCR No MRSA Detected 08/13/2023    MRSAPCR MRSA Detected (A) 05/23/2023     No results found for: \"URCX\"  No components found for: \"LOWRESPCF\"  No results found for: \"THROATCX\"  No results found for: \"CULTURES\"  No components found for: \"STREPBCX\"  No results found for: \"STREPPNEUAG\"  No results found for: \"LEGIONELLA\"  No results found for: \"LEGANTIGENUR\"  No results found for: \"MYCOPLASCX\"  No results found for: \"GCCX\"  No results found for: \"WOUNDCX\"  No results found for: \"BODYFLDCX\"    Lab Results   Component Value Date    FLU Negative 01/22/2018    FLU Negative 01/22/2018       Lab Results   Component Value Date    ADENOVIRUS Not Detected 05/03/2025     Lab Results   Component Value Date    GZ184O Not Detected 05/03/2025     Lab Results   Component Value Date    CVHKU1 Not Detected 05/03/2025     Lab Results   Component Value Date    CVNL63 Not Detected 05/03/2025     Lab Results   Component Value Date    CVOC43 Not Detected 05/03/2025     Lab Results   Component Value Date    HUMETPNEVS Not Detected 05/03/2025     Lab Results   Component " "Value Date    HURVEV Not Detected 05/03/2025     Lab Results   Component Value Date    FLUBPCR Not Detected 05/03/2025     Lab Results   Component Value Date    PARAINFLUE Not Detected 05/03/2025     Lab Results   Component Value Date    PARAFLUV2 Not Detected 05/03/2025     Lab Results   Component Value Date    PARAFLUV3 Not Detected 05/03/2025     Lab Results   Component Value Date    PARAFLUV4 Not Detected 05/03/2025     Lab Results   Component Value Date    BPERTPCR Not Detected 05/03/2025     No results found for: \"[id number]\"  Lab Results   Component Value Date    CPNEUPCR Not Detected 05/03/2025     Lab Results   Component Value Date    MPNEUMO Not Detected 05/03/2025     Lab Results   Component Value Date    FLUAPCR Not Detected 05/03/2025     No results found for: \"FLUAH3\"  No results found for: \"FLUAH1\"  Lab Results   Component Value Date    RSV Not Detected 05/03/2025     Lab Results   Component Value Date    BPARAPCR Not Detected 05/03/2025       COVID 19:  Lab Results   Component Value Date    COVID19 Not Detected 05/03/2025           No results found for: \"THCURSCR\"  No results found for: \"PCPUR\"  No results found for: \"COCAINEUR\"  No results found for: \"METAMPSCNUR\"  No results found for: \"LABOPIASCN\"  No results found for: \"AMPHETSCREEN\"  No results found for: \"LABBENZSCN\"  No results found for: \"TRICYCLICSCN\"  No results found for: \"LABMETHSCN\"  No results found for: \"BARBITSCNUR\"  No results found for: \"OXYCODONESCN\"  No results found for: \"PROPOXSCN\"  No results found for: \"BUPRENORSCNU\"  No results found for: \"ETHANOLMGDL\"  No results found for: \"ETOHPCT\"    ABG:  Recent Labs     01/02/25  1825 01/03/25  0012 05/03/25  1613 05/12/25  0558   PHART 7.365  --  7.350 7.410   UPN2JPG 66.0*  --  85.4* 74.5*   PO2ART 84.0  --  74.2* 57.6*   AIB1SPZ 37.7*  --  47.1* 47.2*   BASEEXCESS 10.6* 7.7* 18.4* 19.7*       Lab Results   Component Value Date    LACTATE 2.2 01/03/2025    LACTATE 0.6 01/02/2025    " LACTATE 1.2 07/22/2024       Imaging Study: Latest imaging studies was reviewed personally.   Imaging Results (Last 72 Hours)       Procedure Component Value Units Date/Time    XR Chest 1 View [[id number]] Collected: 05/12/25 0824     Updated: 05/12/25 0826    Narrative:      PROCEDURE: XR CHEST 1 VW-     HISTORY: weakness, shortness of breath     COMPARISON: 5/3/2025     FINDINGS:  Portable view of the chest demonstrates emphysematous  changes. No acute pulmonary density is seen. There is no evidence of  effusion, pneumothorax or other significant pleural disease. The  mediastinum is unremarkable.     The heart size is normal.       Impression:      No acute findings           This report was signed and finalized on 5/12/2025 8:24 AM by Dillon Veliz MD.       CT Abdomen Pelvis With Contrast [[id number]] Collected: 05/12/25 0753     Updated: 05/12/25 0759    Narrative:      PROCEDURE: CT ABDOMEN PELVIS W CONTRAST-     TECHNIQUE: IV contrast enhanced exam     HISTORY: Abdominal pain, AMS     COMPARISON: 1/2/2025.     FINDINGS:     ABDOMEN: Small hypodense lesions are seen in the liver parenchyma  compatible with benign cyst. Bilateral renal cysts are noted stable.  Right renal cyst is complex with calcified thin septation. Left adrenal  mass is stable compatible with benign adenoma. Remaining solid organs  are normal. No bowel obstruction is seen. Moderate fecal impaction is  noted. Small midline abdominal wall hernia contains transverse colon  without obstruction.     PELVIS: Surgical changes are seen of the cecum. Pelvic bowel loops are  unremarkable. Bladder has a normal appearance. Patient is status post  hysterectomy. Appendix is not visualized but no secondary findings of  appendicitis are seen. Chronic severe compression fracture of L3 is  noted.       Impression:      Chronic findings without acute process        This study was performed with techniques to keep radiation doses as low  as reasonably achievable  (ALARA). Individualized dose reduction  techniques using automated exposure control or adjustment of vA and/or  kV according to the patient size were employed.     This report was signed and finalized on 5/12/2025 7:57 AM by Dillon Veliz MD.       CT Head Without Contrast [[id number]] Collected: 05/12/25 0652     Updated: 05/12/25 0654    Narrative:      FINAL REPORT    TECHNIQUE:  null    CLINICAL HISTORY:  altered mental status, confusion, weakness    COMPARISON:  null    FINDINGS:  CT head without contrast    Comparison: CT/SR - CT HEAD WO CONTRAST - 1/2/25 17:57 EST    Findings:    No intra-axial mass, midline shift, hydrocephalus, or acute hemorrhage.    Mild global volume loss with subcortical and periventricular white matter hypodensity suggesting chronic microangiopathy.    The visualized paranasal sinuses and mastoid air cells are normal.    The orbits are unremarkable.    No skull fracture.      Impression:      IMPRESSION:    1. No acute intracranial findings.    Authenticated and Electronically Signed by Larissa Spivey on  05/12/2025 06:52:38 AM            ECHO:  Results for orders placed during the hospital encounter of 05/03/25    Adult Transthoracic Echo Complete W/ Cont if Necessary Per Protocol    Interpretation Summary    Left ventricular systolic function is normal. Calculated left ventricular EF = 57.3%    Left ventricular wall thickness is consistent with mild septal asymmetric hypertrophy.    Left ventricular diastolic function was indeterminate.    Moderately reduced right ventricular systolic function noted.    Left atrial volume is mildly increased.    Mild aortic valve stenosis is present.    Estimated right ventricular systolic pressure from tricuspid regurgitation is normal (<35 mmHg).    Saline test results are negative for intracardiac shunting.      Results for orders placed during the hospital encounter of 07/22/24    Adult Transthoracic Echo Limited W/ Cont if Necessary Per  Protocol    Interpretation Summary    Left ventricular systolic function is mildly to moderately decreased. Left ventricular ejection fraction appears to be 36 - 40%.    Regional wall motion abnormalities as below.    No left ventricular thrombus identified by contrasted study.      Adult Transthoracic Echo Complete W/ Cont if Necessary Per Protocol    Interpretation Summary    Left ventricular systolic function is mildly decreased. Calculated left ventricular EF = 41.1% Left ventricular ejection fraction appears to be 41 - 45%.  Grade I diastolic dysfunction present.    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  No obvious large mass or thrombus was noted but small thrombus cannot be definitively excluded.  Recommend repeat limited echo with contrast for further evaluation.    Right ventricle of normal size with borderline systolic function.    Mild mitral valve regurgitation is present with an anteriorly-directed jet noted.    A prominent Chiari network may be present in the right atrium.               Assessment:  1.  Acute Exacerbation of COPD   2.  Severe chronic respiratory acidosis  3.  Severe underlying COPD  4.  Noncompliance with NIV use  5.  Anemia    Discussion/Recommendations:   I have adjusted the nebulized treatments as appropriate.     I have also adjusted the dose of steroids, especially since the patient continues to have significant symptoms and was clearly in respiratory distress at the time of examination.    Will administer 1 dose of Depo-Medrol    ABG will be repeated, as clinically indicated.    I have a long discussion with the patient regarding the need to be compliant with NIV device.    Although the patient has not used NIV device in a few weeks, and even in the past she was using it only sparingly, I feel that her issues are mostly due to either inappropriate settings or issues with the mask.    I will try the patient on our NIV device with somewhat of a low setting and a  modified fullface mask.    The patient says that she will try to use NIV device 2 hours on and 2 hours off.    I reviewed the patient's last PFTs, May 2022, and she had severe obstructive defect with FEV1 of only 31% predicted.    The plan was discussed with the patient.  I have also discussed the case with the nursing staff.    All relevant recommendations were, and will be, discussed with referring provider, REGINA Coleman (hospitalist service), as indicated    I would like to thank you for the opportunity to participate in the care of this patient.  We will communicate changes and recommendations, if and when necessary.      This document was electronically signed by Herbert Poe MD on 05/13/25 at 16:49 EDT      Dictated utilizing Dragon dictation.

## 2025-05-13 NOTE — PLAN OF CARE
Goal Outcome Evaluation:  Plan of Care Reviewed With: patient        Progress: no change  Outcome Evaluation: Pt has been alert and oriented throughout shift, appeared to sleep well between care. Maintaining o2 sats >90% on 3L NC, HR did flip back into afib this morning rate 70's.

## 2025-05-13 NOTE — PLAN OF CARE
Problem: Violence Risk or Actual  Goal: Anger and Impulse Control  Outcome: Progressing  Intervention: Minimize Safety Risk  Recent Flowsheet Documentation  Taken 5/13/2025 1400 by Dewayne Marr RN  Enhanced Safety Measures: bed alarm set  Taken 5/13/2025 1200 by Dewayne Marr RN  Enhanced Safety Measures: bed alarm set  Taken 5/13/2025 1000 by Dewayne Marr RN  Enhanced Safety Measures:   bed alarm refused   bed alarm set  Taken 5/13/2025 0800 by Dewayne Marr RN  Enhanced Safety Measures: bed alarm set     Problem: Adult Inpatient Plan of Care  Goal: Plan of Care Review  Outcome: Progressing  Goal: Patient-Specific Goal (Individualized)  Outcome: Progressing  Goal: Absence of Hospital-Acquired Illness or Injury  Outcome: Progressing  Intervention: Identify and Manage Fall Risk  Recent Flowsheet Documentation  Taken 5/13/2025 1400 by Dewayne Marr RN  Safety Promotion/Fall Prevention: safety round/check completed  Taken 5/13/2025 1200 by Dewayne Marr RN  Safety Promotion/Fall Prevention: safety round/check completed  Taken 5/13/2025 1000 by Dewayne Marr RN  Safety Promotion/Fall Prevention: safety round/check completed  Taken 5/13/2025 0800 by Dewayne Marr RN  Safety Promotion/Fall Prevention:   safety round/check completed   assistive device/personal items within reach  Intervention: Prevent Skin Injury  Recent Flowsheet Documentation  Taken 5/13/2025 1400 by Dewayne Marr RN  Body Position:   side-lying   right   position changed independently  Taken 5/13/2025 1200 by Dewayne Marr RN  Body Position: dangle, side of bed  Taken 5/13/2025 1000 by Dewayne Marr RN  Body Position:   side-lying   right   position changed independently  Taken 5/13/2025 0800 by Dewayne Marr RN  Body Position:   dangle, side of bed   position changed independently  Skin Protection: incontinence pads utilized  Intervention: Prevent and Manage VTE (Venous Thromboembolism) Risk  Recent Flowsheet Documentation  Taken  5/13/2025 0800 by Dewayne Marr RN  VTE Prevention/Management: patient refused intervention  Goal: Optimal Comfort and Wellbeing  Outcome: Progressing  Intervention: Provide Person-Centered Care  Recent Flowsheet Documentation  Taken 5/13/2025 0800 by Dewayne Marr RN  Trust Relationship/Rapport:   care explained   choices provided   emotional support provided   empathic listening provided   questions answered   questions encouraged   reassurance provided   thoughts/feelings acknowledged  Goal: Readiness for Transition of Care  Outcome: Progressing     Problem: Fall Injury Risk  Goal: Absence of Fall and Fall-Related Injury  Outcome: Progressing  Intervention: Identify and Manage Contributors  Recent Flowsheet Documentation  Taken 5/13/2025 0800 by Dewayne Marr RN  Medication Review/Management: medications reviewed  Intervention: Promote Injury-Free Environment  Recent Flowsheet Documentation  Taken 5/13/2025 1400 by Dewayne Marr RN  Safety Promotion/Fall Prevention: safety round/check completed  Taken 5/13/2025 1200 by Dewayne Marr RN  Safety Promotion/Fall Prevention: safety round/check completed  Taken 5/13/2025 1000 by Dewayne Marr RN  Safety Promotion/Fall Prevention: safety round/check completed  Taken 5/13/2025 0800 by Dewayne Marr RN  Safety Promotion/Fall Prevention:   safety round/check completed   assistive device/personal items within reach     Problem: Skin Injury Risk Increased  Goal: Skin Health and Integrity  Outcome: Progressing  Intervention: Optimize Skin Protection  Recent Flowsheet Documentation  Taken 5/13/2025 1400 by Dewayne Marr RN  Activity Management: activity encouraged  Head of Bed (HOB) Positioning: HOB lowered  Taken 5/13/2025 1200 by Dewayne Marr RN  Activity Management: activity encouraged  Head of Bed (HOB) Positioning: HOB lowered  Taken 5/13/2025 1000 by Dewayne Marr RN  Activity Management: activity encouraged  Head of Bed (HOB) Positioning: HOB lowered  Taken  5/13/2025 0800 by Dewayne Marr RN  Activity Management:   activity encouraged   up to bedside commode  Pressure Reduction Techniques:   frequent weight shift encouraged   pressure points protected  Head of Bed (HOB) Positioning: HOB lowered  Pressure Reduction Devices: positioning supports utilized  Skin Protection: incontinence pads utilized     Problem: Comorbidity Management  Goal: Maintenance of COPD Symptom Control  Outcome: Progressing  Intervention: Maintain COPD (Chronic Obstructive Pulmonary Disease) Symptom Control  Recent Flowsheet Documentation  Taken 5/13/2025 0800 by Dewayne Marr RN  Medication Review/Management: medications reviewed  Goal: Maintenance of Heart Failure Symptom Control  Outcome: Progressing  Intervention: Maintain Heart Failure Management  Recent Flowsheet Documentation  Taken 5/13/2025 0800 by Dewayne Marr RN  Medication Review/Management: medications reviewed  Goal: Blood Pressure in Desired Range  Outcome: Progressing  Intervention: Maintain Blood Pressure Management  Recent Flowsheet Documentation  Taken 5/13/2025 0800 by Dewayne Marr RN  Medication Review/Management: medications reviewed   Goal Outcome Evaluation:

## 2025-05-13 NOTE — PROGRESS NOTES
[facility] HOSPITALIST    PROGRESS NOTE    Name:  Gabi Dudley   Age:  80 y.o.  Sex:  female  :  1945  MRN:  [id number]   Visit Number:  [id number]  Admission Date:  2025  Date Of Service:  25  Primary Care Physician:  Krystina Saunders DO     LOS: 1 day :    Chief Complaint:      Dyspnea    Subjective:    Patient seen and examined with daughter at bedside.  Per daughter patient awakened her confused.  Had not been compliant with BiPAP therapy.  Denied change in sputum or fever.  Had been on Seroquel, trazodone, Valium with last hospitalization.    Hospital Course:    Ms. Reed is a pleasant 80-year-old female with pertinent past medical history of chronic respiratory failure with COPD on 4 L nasal cannula, hypertension, paroxysmal atrial fibrillation on Eliquis, chronic systolic heart failure, depression and anxiety, anemia who presented to emergency department due to worsening shortness of air/confusion.  Patient noncompliant with BiPAP at home.  Recent admission for COPD exacerbation with A-fib with RVR.    Upon ED presentation patient requiring 4 L nasal cannula which is baseline, otherwise hemodynamically stable.  Pertinent labs and imaging noted procalcitonin 0.07, WBC 5.67, initial troponin 17 with repeat 17, sodium 148, ABG noted compensated pH is 7.4 with pCO2 74.5, chest x-ray with no acute finding, CT of head negative for intracranial findings, CT abdomen pelvis chronic findings with no acute process.  Hospitalist consulted for further medical management.      Review of Systems:     All systems were reviewed and negative except as mentioned in subjective, assessment and plan.    Vital Signs:    Temp:  [97.5 °F (36.4 °C)-98.2 °F (36.8 °C)] 98.2 °F (36.8 °C)  Heart Rate:  [57-62] 57  Resp:  [16-18] 18  BP: (140-180)/(56-76) 180/76    Intake and output:    I/O last 3 completed shifts:  In: 726 [P.O.:440; I.V.:286]  Out: -   I/O this shift:  In: 240 [P.O.:240]  Out: -      Physical Examination:    General Appearance:  Alert and cooperative.  Chronically ill middle-age female.   Head:  Atraumatic and normocephalic.   Eyes: Conjunctivae and sclerae normal, no icterus. No pallor.   Throat: No oral lesions, no thrush, oral mucosa moist.   Neck: Supple, trachea midline, no thyromegaly.   Lungs:   Breath sounds heard bilaterally equally.  No wheezing or crackles. No Pleural rub or bronchial breathing.  Slightly dyspneic on 4 L nasal cannula.   Heart:  Normal S1 and S2, no murmur, no gallop, no rub. No JVD.   Abdomen:   Normal bowel sounds, no masses, no organomegaly. Soft, nontender, nondistended, no rebound tenderness.   Extremities: Supple, no edema, no cyanosis, no clubbing.   Skin: No bleeding or rash.   Neurologic: Alert and oriented x 3. No facial asymmetry. Moves all four limbs. No tremors.  Severe generalized weakness.     Laboratory results:    Results from last 7 days   Lab Units 05/13/25  0623 05/12/25  0540   SODIUM mmol/L 143 148*   POTASSIUM mmol/L 4.4 4.4   CHLORIDE mmol/L 101 101   CO2 mmol/L 39.2* 46.3*   BUN mg/dL 20 24*   CREATININE mg/dL 0.46* 0.58   CALCIUM mg/dL 8.6 8.6   BILIRUBIN mg/dL  --  0.2   ALK PHOS U/L  --  107   ALT (SGPT) U/L  --  30   AST (SGOT) U/L  --  20   GLUCOSE mg/dL 136* 105*     Results from last 7 days   Lab Units 05/13/25  0623 05/12/25  0540   WBC 10*3/mm3 6.77 5.67   HEMOGLOBIN g/dL 10.1* 9.1*   HEMATOCRIT % 34.1 32.4*   PLATELETS 10*3/mm3 156 166     Results from last 7 days   Lab Units 05/12/25  0540   INR  1.13*     Results from last 7 days   Lab Units 05/12/25  0814 05/12/25  0540   HSTROP T ng/L 17* 17*         Recent Labs     01/02/25  1825 01/03/25  0012 05/03/25  1613 05/12/25  0558   PHART 7.365  --  7.350 7.410   [id number] 66.0*  --  85.4* 74.5*   PO2ART 84.0  --  74.2* 57.6*   KQS9TOF 37.7*  --  47.1* 47.2*   BASEEXCESS 10.6* 7.7* 18.4* 19.7*      I have reviewed the patient's laboratory results.    Radiology results:    XR Chest 1  View  Result Date: 5/12/2025  PROCEDURE: XR CHEST 1 VW-  HISTORY: weakness, shortness of breath  COMPARISON: 5/3/2025  FINDINGS:  Portable view of the chest demonstrates emphysematous changes. No acute pulmonary density is seen. There is no evidence of effusion, pneumothorax or other significant pleural disease. The mediastinum is unremarkable.  The heart size is normal.      Impression: No acute findings    This report was signed and finalized on 5/12/2025 8:24 AM by Dillon Veliz MD.      CT Abdomen Pelvis With Contrast  Result Date: 5/12/2025  PROCEDURE: CT ABDOMEN PELVIS W CONTRAST-  TECHNIQUE: IV contrast enhanced exam  HISTORY: Abdominal pain, AMS  COMPARISON: 1/2/2025.  FINDINGS:  ABDOMEN: Small hypodense lesions are seen in the liver parenchyma compatible with benign cyst. Bilateral renal cysts are noted stable. Right renal cyst is complex with calcified thin septation. Left adrenal mass is stable compatible with benign adenoma. Remaining solid organs are normal. No bowel obstruction is seen. Moderate fecal impaction is noted. Small midline abdominal wall hernia contains transverse colon without obstruction.  PELVIS: Surgical changes are seen of the cecum. Pelvic bowel loops are unremarkable. Bladder has a normal appearance. Patient is status post hysterectomy. Appendix is not visualized but no secondary findings of appendicitis are seen. Chronic severe compression fracture of L3 is noted.      Impression: Chronic findings without acute process   This study was performed with techniques to keep radiation doses as low as reasonably achievable (ALARA). Individualized dose reduction techniques using automated exposure control or adjustment of vA and/or kV according to the patient size were employed.  This report was signed and finalized on 5/12/2025 7:57 AM by Dillon Veliz MD.      CT Head Without Contrast  Result Date: 5/12/2025  FINAL REPORT TECHNIQUE: null CLINICAL HISTORY: altered mental status, confusion,  weakness COMPARISON: null FINDINGS: CT head without contrast Comparison: CT/SR - CT HEAD WO CONTRAST - 1/2/25 17:57 EST Findings: No intra-axial mass, midline shift, hydrocephalus, or acute hemorrhage. Mild global volume loss with subcortical and periventricular white matter hypodensity suggesting chronic microangiopathy. The visualized paranasal sinuses and mastoid air cells are normal. The orbits are unremarkable. No skull fracture.     Impression: IMPRESSION: 1. No acute intracranial findings. Authenticated and Electronically Signed by Larissa Spivey on 05/12/2025 06:52:38 AM    I have reviewed the patient's radiology reports.    Medication Review:     I have reviewed the patient's active and prn medications.     Problem List:      Altered mental status    COPD exacerbation    Altered mental status, unspecified      Assessment:    Chronic hypoxic hypercapnic respiratory failure  Acute exacerbation of COPD  Chronic systolic congestive heart failure  Atrial fibrillation on Eliquis  Hypertension  Hyperlipidemia  Tobacco use disorder  Osteoporosis  Osteoarthritis  Fibromyalgia  Anxiety  Depression  Vitamin D deficiency    Plan:    Chronic hypoxic/hypercapnic respiratory failure  - Will consult pulmonology at this time, patient noncompliant with BiPAP.  She is currently alert and oriented x 3.  Advised to discontinue trazodone at this time.  - Will continue IV steroids at this time.  Transition to oral as soon as possible as patient does have history of A-fib with RVR.  - Duonebs.   - Home medications as appropriate.  - Did discuss CODE STATUS in depth with last hospitalization, patient wishes to continue DNR/DNI status.    DVT Prophylaxis: Eliquis  Code Status: DNR/DNI  Diet: Regular  Discharge Plan: Home in 1-2 days    Britt Nascimento, APRN  05/13/25  12:18 EDT    Dictated utilizing Dragon dictation.

## 2025-05-14 LAB
ANION GAP SERPL CALCULATED.3IONS-SCNC: 7 MMOL/L (ref 5–15)
BASOPHILS # BLD AUTO: 0.02 10*3/MM3 (ref 0–0.2)
BASOPHILS NFR BLD AUTO: 0.1 % (ref 0–1.5)
BUN SERPL-MCNC: 20 MG/DL (ref 8–23)
BUN/CREAT SERPL: 43.5 (ref 7–25)
CALCIUM SPEC-SCNC: 9 MG/DL (ref 8.6–10.5)
CHLORIDE SERPL-SCNC: 97 MMOL/L (ref 98–107)
CO2 SERPL-SCNC: 37 MMOL/L (ref 22–29)
CREAT SERPL-MCNC: 0.46 MG/DL (ref 0.57–1)
DEPRECATED RDW RBC AUTO: 48.8 FL (ref 37–54)
EGFRCR SERPLBLD CKD-EPI 2021: 98.1 ML/MIN/1.73
EOSINOPHIL # BLD AUTO: 0 10*3/MM3 (ref 0–0.4)
EOSINOPHIL NFR BLD AUTO: 0 % (ref 0.3–6.2)
ERYTHROCYTE [DISTWIDTH] IN BLOOD BY AUTOMATED COUNT: 15.3 % (ref 12.3–15.4)
GLUCOSE SERPL-MCNC: 130 MG/DL (ref 65–99)
HCT VFR BLD AUTO: 34.8 % (ref 34–46.6)
HGB BLD-MCNC: 10.5 G/DL (ref 12–15.9)
IMM GRANULOCYTES # BLD AUTO: 0.11 10*3/MM3 (ref 0–0.05)
IMM GRANULOCYTES NFR BLD AUTO: 0.8 % (ref 0–0.5)
LYMPHOCYTES # BLD AUTO: 0.68 10*3/MM3 (ref 0.7–3.1)
LYMPHOCYTES NFR BLD AUTO: 5 % (ref 19.6–45.3)
MCH RBC QN AUTO: 26.3 PG (ref 26.6–33)
MCHC RBC AUTO-ENTMCNC: 30.2 G/DL (ref 31.5–35.7)
MCV RBC AUTO: 87.2 FL (ref 79–97)
MONOCYTES # BLD AUTO: 0.26 10*3/MM3 (ref 0.1–0.9)
MONOCYTES NFR BLD AUTO: 1.9 % (ref 5–12)
NEUTROPHILS NFR BLD AUTO: 12.62 10*3/MM3 (ref 1.7–7)
NEUTROPHILS NFR BLD AUTO: 92.2 % (ref 42.7–76)
NRBC BLD AUTO-RTO: 0 /100 WBC (ref 0–0.2)
PLATELET # BLD AUTO: 155 10*3/MM3 (ref 140–450)
PMV BLD AUTO: 11.9 FL (ref 6–12)
POTASSIUM SERPL-SCNC: 4 MMOL/L (ref 3.5–5.2)
RBC # BLD AUTO: 3.99 10*6/MM3 (ref 3.77–5.28)
SODIUM SERPL-SCNC: 141 MMOL/L (ref 136–145)
WBC NRBC COR # BLD AUTO: 13.69 10*3/MM3 (ref 3.4–10.8)

## 2025-05-14 PROCEDURE — 63710000001 REVEFENACIN 175 MCG/3ML SOLUTION: Performed by: INTERNAL MEDICINE

## 2025-05-14 PROCEDURE — 94664 DEMO&/EVAL PT USE INHALER: CPT

## 2025-05-14 PROCEDURE — 25010000002 METHYLPREDNISOLONE PER 40 MG: Performed by: NURSE PRACTITIONER

## 2025-05-14 PROCEDURE — 99233 SBSQ HOSP IP/OBS HIGH 50: CPT | Performed by: INTERNAL MEDICINE

## 2025-05-14 PROCEDURE — 94761 N-INVAS EAR/PLS OXIMETRY MLT: CPT

## 2025-05-14 PROCEDURE — 94799 UNLISTED PULMONARY SVC/PX: CPT

## 2025-05-14 PROCEDURE — 80048 BASIC METABOLIC PNL TOTAL CA: CPT | Performed by: NURSE PRACTITIONER

## 2025-05-14 PROCEDURE — 85025 COMPLETE CBC W/AUTO DIFF WBC: CPT | Performed by: NURSE PRACTITIONER

## 2025-05-14 PROCEDURE — 99232 SBSQ HOSP IP/OBS MODERATE 35: CPT | Performed by: NURSE PRACTITIONER

## 2025-05-14 RX ORDER — LEVOCETIRIZINE DIHYDROCHLORIDE 5 MG/1
5 TABLET, FILM COATED ORAL EVERY EVENING
COMMUNITY

## 2025-05-14 RX ADMIN — GUAIFENESIN 600 MG: 600 TABLET, EXTENDED RELEASE ORAL at 08:48

## 2025-05-14 RX ADMIN — APIXABAN 5 MG: 5 TABLET, FILM COATED ORAL at 08:48

## 2025-05-14 RX ADMIN — ARFORMOTEROL TARTRATE 15 MCG: 15 SOLUTION RESPIRATORY (INHALATION) at 06:49

## 2025-05-14 RX ADMIN — ATORVASTATIN CALCIUM 40 MG: 40 TABLET, FILM COATED ORAL at 08:48

## 2025-05-14 RX ADMIN — DIAZEPAM 5 MG: 5 TABLET ORAL at 21:55

## 2025-05-14 RX ADMIN — DIAZEPAM 5 MG: 5 TABLET ORAL at 12:30

## 2025-05-14 RX ADMIN — GUAIFENESIN 600 MG: 600 TABLET, EXTENDED RELEASE ORAL at 21:55

## 2025-05-14 RX ADMIN — METHYLPREDNISOLONE SODIUM SUCCINATE 40 MG: 40 INJECTION, POWDER, FOR SOLUTION INTRAMUSCULAR; INTRAVENOUS at 05:17

## 2025-05-14 RX ADMIN — OXYCODONE HYDROCHLORIDE AND ACETAMINOPHEN 1 TABLET: 7.5; 325 TABLET ORAL at 09:12

## 2025-05-14 RX ADMIN — DULOXETINE 60 MG: 30 CAPSULE, DELAYED RELEASE ORAL at 21:55

## 2025-05-14 RX ADMIN — METOPROLOL SUCCINATE 50 MG: 50 TABLET, EXTENDED RELEASE ORAL at 08:48

## 2025-05-14 RX ADMIN — ARFORMOTEROL TARTRATE 15 MCG: 15 SOLUTION RESPIRATORY (INHALATION) at 19:11

## 2025-05-14 RX ADMIN — Medication 10 ML: at 08:48

## 2025-05-14 RX ADMIN — AMIODARONE HYDROCHLORIDE 200 MG: 200 TABLET ORAL at 08:48

## 2025-05-14 RX ADMIN — PANTOPRAZOLE SODIUM 40 MG: 40 TABLET, DELAYED RELEASE ORAL at 08:48

## 2025-05-14 RX ADMIN — Medication 1 TABLET: at 08:48

## 2025-05-14 RX ADMIN — DULOXETINE 60 MG: 30 CAPSULE, DELAYED RELEASE ORAL at 08:48

## 2025-05-14 RX ADMIN — APIXABAN 5 MG: 5 TABLET, FILM COATED ORAL at 21:55

## 2025-05-14 RX ADMIN — BUDESONIDE 1 MG: 0.5 SUSPENSION RESPIRATORY (INHALATION) at 06:49

## 2025-05-14 RX ADMIN — Medication 10 ML: at 21:54

## 2025-05-14 RX ADMIN — METHYLPREDNISOLONE SODIUM SUCCINATE 40 MG: 40 INJECTION, POWDER, FOR SOLUTION INTRAMUSCULAR; INTRAVENOUS at 17:11

## 2025-05-14 RX ADMIN — BUDESONIDE 1 MG: 0.5 SUSPENSION RESPIRATORY (INHALATION) at 19:11

## 2025-05-14 RX ADMIN — SERTRALINE 150 MG: 50 TABLET, FILM COATED ORAL at 21:55

## 2025-05-14 RX ADMIN — REVEFENACIN 175 MCG: 175 SOLUTION RESPIRATORY (INHALATION) at 06:49

## 2025-05-14 NOTE — PLAN OF CARE
Goal Outcome Evaluation:  Plan of Care Reviewed With: patient        Progress: no change  Outcome Evaluation: VSS, pt alert and oriented x4, anxious at times, wore bipap for about 30minutes, otherwise maintaining o2 sats >90% on 2L NC.

## 2025-05-14 NOTE — THERAPY TREATMENT NOTE
Pt was up on the BSC and she reported being dizzy. Her bp was taken and was 109/61. OT offered to assist pt to the chair or the bed and she returned to the bed with her daughter and refused further therapy. OT will f/u at a later time as pt is agreeable to participate.

## 2025-05-14 NOTE — PROGRESS NOTES
"  CC: Acute Respiratory Failure.     S: Currently on AVAPS therapy.  Appears to be mildly tremulous.  According to the nursing staff the patient barely stays on AVAPS for 30 minutes before taking it off.  She sometimes would resist putting it back on.  When asked, the patient actually tries to say that she was never offered for the device to be put back on.?    ROS: Could not be reliably obtained as the patient is on PAP therapy.     O:Vital signs reviewed. FiO2: 45 %.    /74 (BP Location: Left arm, Patient Position: Lying)   Pulse 65   Temp 98.6 °F (37 °C) (Oral)   Resp 18   Ht 165.1 cm (65\")   Wt 60 kg (132 lb 4.4 oz)   SpO2 96%   BMI 22.01 kg/m²     Temp (24hrs), Av °F (36.7 °C), Min:97.3 °F (36.3 °C), Max:98.6 °F (37 °C)      I & Os reviewed.   Intake/Output         25 0700 - 25 0659 25 0700 - 05/15/25 0659    Intake (ml) 660 360    Output (ml) -- --    Net (ml) 660 360    Last Weight 60 kg (132 lb 4.4 oz) --            Net IO Since Admission: 1,746 mL [25 1031]    General/Constitutional: On PAP therapy. Appears to be in some distress.  Eyes: PERRL.   Neck: Supple without JVD. No obvious masses noted.   Cardiovascular: S1 + S2.  Regular at this time  Lungs/Respiratory: Transmitted Breath sounds noted.  Bilateral, somewhat diffuse, wheezing heard.  No significant crackles noted  GI/Abdomen: Soft. Bowel sounds positive  Musculoskeletal/Extremities: No edema noted. Gait could not be assessed at this time, as the patient was laying in bed.   Neurologic: Was able to follow commands. Detailed exam couldn't be performed due to her being on AVAPS therapy.   Psych: AAOx3. Seemed somewhat agitated.  Mild tremors noted      Labs: Reviewed.   Results from last 7 days   Lab Units 25  0735 25  0623 25  0540   WBC 10*3/mm3 13.69* 6.77 5.67   HEMOGLOBIN g/dL 10.5* 10.1* 9.1*   HEMATOCRIT % 34.8 34.1 32.4*   PLATELETS 10*3/mm3 155 156 166   NEUTROPHIL % % 92.2* 86.2* " "62.9   NEUTROS ABS 10*3/mm3 12.62* 5.83 3.57   EOSINOPHIL % % 0.0* 0.0* 2.5   EOS ABS 10*3/mm3 0.00 0.00 0.14   LYMPHOCYTE % % 5.0* 9.7* 24.7   LYMPHS ABS 10*3/mm3 0.68* 0.66* 1.40       Lab Results   Component Value Date    PROCALCITO 0.07 05/12/2025    PROCALCITO 0.06 04/09/2025    PROCALCITO 0.07 01/02/2025       Lab Results   Component Value Date    CRP 3.48 (H) 05/12/2025    CRP 1.14 (H) 04/09/2025       No results found for: \"SEDRATE\"    Lab Results   Component Value Date    PROBNP 2,052.0 (H) 05/03/2025    PROBNP 2,479.0 (H) 04/09/2025    PROBNP 1,444.0 12/20/2024       Results from last 7 days   Lab Units 05/14/25  0735 05/13/25  0623 05/12/25  0540   SODIUM mmol/L 141 143 148*   POTASSIUM mmol/L 4.0 4.4 4.4   CHLORIDE mmol/L 97* 101 101   CO2 mmol/L 37.0* 39.2* 46.3*   BUN mg/dL 20 20 24*   CREATININE mg/dL 0.46* 0.46* 0.58   CALCIUM mg/dL 9.0 8.6 8.6   ANION GAP mmol/L 7.0 2.8* 0.7*   BILIRUBIN mg/dL  --   --  0.2   ALK PHOS U/L  --   --  107   ALT (SGPT) U/L  --   --  30   AST (SGOT) U/L  --   --  20   GLUCOSE mg/dL 130* 136* 105*   TOTAL PROTEIN g/dL  --   --  5.6*   ALBUMIN g/dL  --   --  3.4*       Results from last 7 days   Lab Units 05/12/25  0540   MAGNESIUM mg/dL 2.1       Lab Results   Component Value Date    TSH 4.360 (H) 05/12/2025    TSH 7.510 (H) 05/12/2025    TSH 0.633 05/06/2022       Lab Results   Component Value Date    FREET4 1.24 05/12/2025       Results from last 7 days   Lab Units 05/12/25  0540   INR  1.13*       Lab Results   Component Value Date    CKTOTAL 19 (L) 01/02/2025    CKTOTAL 126 11/16/2021    CKTOTAL 121 10/27/2020       No components found for: \"HSTROPT\"    Lab Results   Component Value Date    TROPONINT 17 (H) 05/12/2025    TROPONINT 17 (H) 05/12/2025    TROPONINT 13 05/03/2025       No results found for: \"DDIMER\"    Lab Results   Component Value Date    LIPASE 10 (L) 07/22/2024    LIPASE 17 06/24/2024    LIPASE 23 08/07/2023       Brief Urine Lab Results  (Last result " "in the past 365 days)        Color   Clarity   Blood   Leuk Est   Nitrite   Protein   CREAT   Urine HCG        05/12/25 0545 Yellow   Clear   Negative   Negative   Negative   Negative                     Micro: As of May 14, 2025   No results found for: \"RESPCX\"  No results found for: \"BCIDPCR\"  Lab Results   Component Value Date    BLOODCX No growth at 5 days 01/02/2025    BLOODCX No growth at 5 days 01/02/2025    BLOODCX No growth at 5 days 07/22/2024    BLOODCX No growth at 5 days 07/22/2024     Lab Results   Component Value Date    URINECX No growth 07/24/2024    URINECX 25,000 CFU/mL Mixed Doris Isolated 07/10/2024    URINECX Final report 01/09/2024     No results found for: \"MRSACX\"  Lab Results   Component Value Date    MRSAPCR No MRSA Detected 08/13/2023    MRSAPCR MRSA Detected (A) 05/23/2023     No results found for: \"URCX\"  No components found for: \"LOWRESPCF\"  No results found for: \"THROATCX\"  No results found for: \"CULTURES\"  No components found for: \"STREPBCX\"  No results found for: \"STREPPNEUAG\"  No results found for: \"LEGIONELLA\"  No results found for: \"LEGANTIGENUR\"  No results found for: \"MYCOPLASCX\"  No results found for: \"GCCX\"  No results found for: \"WOUNDCX\"  No results found for: \"BODYFLDCX\"    Lab Results   Component Value Date    FLU Negative 01/22/2018    FLU Negative 01/22/2018       Lab Results   Component Value Date    ADENOVIRUS Not Detected 05/03/2025     Lab Results   Component Value Date    OZ728W Not Detected 05/03/2025     Lab Results   Component Value Date    CVHKU1 Not Detected 05/03/2025     Lab Results   Component Value Date    CVNL63 Not Detected 05/03/2025     Lab Results   Component Value Date    CVOC43 Not Detected 05/03/2025     Lab Results   Component Value Date    HUMETPNEVS Not Detected 05/03/2025     Lab Results   Component Value Date    HURVEV Not Detected 05/03/2025     Lab Results   Component Value Date    FLUBPCR Not Detected 05/03/2025     Lab Results   Component " "Value Date    PARAINFLUE Not Detected 05/03/2025     Lab Results   Component Value Date    PARAFLUV2 Not Detected 05/03/2025     Lab Results   Component Value Date    PARAFLUV3 Not Detected 05/03/2025     Lab Results   Component Value Date    PARAFLUV4 Not Detected 05/03/2025     Lab Results   Component Value Date    BPERTPCR Not Detected 05/03/2025     No results found for: \"[id number]\"  Lab Results   Component Value Date    CPNEUPCR Not Detected 05/03/2025     Lab Results   Component Value Date    MPNEUMO Not Detected 05/03/2025     Lab Results   Component Value Date    FLUAPCR Not Detected 05/03/2025     No results found for: \"FLUAH3\"  No results found for: \"FLUAH1\"  Lab Results   Component Value Date    RSV Not Detected 05/03/2025     Lab Results   Component Value Date    BPARAPCR Not Detected 05/03/2025       COVID 19:  Lab Results   Component Value Date    COVID19 Not Detected 05/03/2025           ABG:   Recent Labs     01/02/25  1825 01/03/25  0012 05/03/25  1613 05/12/25  0558   PHART 7.365  --  7.350 7.410   DTC7JSL 66.0*  --  85.4* 74.5*   PO2ART 84.0  --  74.2* 57.6*   RFF0DYR 37.7*  --  47.1* 47.2*   BASEEXCESS 10.6* 7.7* 18.4* 19.7*       Lab Results   Component Value Date    LACTATE 2.2 01/03/2025    LACTATE 0.6 01/02/2025    LACTATE 1.2 07/22/2024         Echo: Results for orders placed during the hospital encounter of 05/03/25    Adult Transthoracic Echo Complete W/ Cont if Necessary Per Protocol    Interpretation Summary    Left ventricular systolic function is normal. Calculated left ventricular EF = 57.3%    Left ventricular wall thickness is consistent with mild septal asymmetric hypertrophy.    Left ventricular diastolic function was indeterminate.    Moderately reduced right ventricular systolic function noted.    Left atrial volume is mildly increased.    Mild aortic valve stenosis is present.    Estimated right ventricular systolic pressure from tricuspid regurgitation is normal (<35 mmHg).   "  Saline test results are negative for intracardiac shunting.      Results for orders placed during the hospital encounter of 07/22/24    Adult Transthoracic Echo Limited W/ Cont if Necessary Per Protocol    Interpretation Summary    Left ventricular systolic function is mildly to moderately decreased. Left ventricular ejection fraction appears to be 36 - 40%.    Regional wall motion abnormalities as below.    No left ventricular thrombus identified by contrasted study.           CXRay: Latest imaging study was reviewed personally.   Imaging Results (Last 24 Hours)       ** No results found for the last 24 hours. **              Assessment & Recommendations/Plan:   1.  Acute Respiratory Failure  I had a very long discussion with the patient in the presence of the hospitalist REGINA at the bedside.  I told the patient that based on my discussion with the nursing staff, she has been able to tolerate AVAPS without any issues and I am not sure why she does not keep it on for more than 30 minutes at a time.  The patient says that she will definitely try to be more compliant.  I reviewed her latest ABG and reminded her that her last CO2 was still 74, which is extremely high, and suggestive of severe chronic respiratory acidosis.    2.  Acute exacerbation of COPD  Continues to have symptoms of exacerbation with very slow improvement.  Would recommend continuation of nebulized treatments at the current dose  Will also recommend against changing the dose of Solu-Medrol.  Status post Depo-Medrol on 5/13/2025.  COPD is severe with last FEV1 of 31% predicted (PFTs in 2022)    3.  Anemia  Will need to be followed  Appears to be mild  Lab Results   Component Value Date    HGB 10.5 (L) 05/14/2025    HGB 10.1 (L) 05/13/2025    HGB 9.1 (L) 05/12/2025     Lab Results   Component Value Date     05/14/2025     05/13/2025     05/12/2025     Lab Results   Component Value Date    INR 1.13 (H) 05/12/2025    INR 0.9  01/03/2025    INR 1.07 11/16/2021     She remains at high risk for further deterioration and will need to be monitored very closely.    I have discussed patient's overall clinical status with REGINA Coleman (hospitalist service) especially with regards to respiratory status and my recommendations which were conveyed to the patient in her presence.  We also discussed outpatient follow-up issues as the patient has difficulty with travel arrangements    Plan was also discussed with nursing staff, as necessary.     This document was electronically signed by Herbert Poe MD on 05/14/25 at 10:31 EDT      Dictated utilizing Dragon dictation.

## 2025-05-14 NOTE — CASE MANAGEMENT/SOCIAL WORK
Case Management/Social Work    Patient Name:  Gabi Dudley  YOB: 1945  MRN: [id number]  Admit Date:  5/12/2025        10:38 EDT   Discharge Plan       Row Name 05/14/25 1038       Plan    Plan Patient will return home with her daughter. Current with [facility].                        Electronically signed by:  Jesus Manuel Mathis RN  05/14/25 10:38 EDT

## 2025-05-14 NOTE — THERAPY TREATMENT NOTE
After being on BSC and getting back to bed, pt stated she wished to hold PT this date. Pt had been dizzy on BSC with /61. Pt's daughter had assisted pt back to bed with CGA. Will plan to check back with pt at a later date. Pt's nurse aware of pt declining PT this date.

## 2025-05-14 NOTE — PLAN OF CARE
Problem: Noninvasive Ventilation Acute  Goal: Effective Unassisted Ventilation and Oxygenation  Outcome: Progressing  Intervention: Monitor and Manage Noninvasive Ventilation  Flowsheets (Taken 5/13/2025 2300)  Airway/Ventilation Management:   positive pressure ventilation provided   oxygen therapy provided   Goal Outcome Evaluation:

## 2025-05-14 NOTE — PROGRESS NOTES
[facility] HOSPITALIST    PROGRESS NOTE    Name:  Gabi Dudley   Age:  80 y.o.  Sex:  female  :  1945  MRN:  [id number]   Visit Number:  [id number]  Admission Date:  2025  Date Of Service:  25  Primary Care Physician:  Krystina Saunders DO     LOS: 2 days :    Chief Complaint:      Dyspnea    Subjective:    Patient seen and examined with Dr. Poe at bedside. Only wore NIV 30 min overnight. She is more agreeable to wear today. Emphasized importance. BP elevated.    Hospital Course:    Ms. Reed is a pleasant 80-year-old female with pertinent past medical history of chronic respiratory failure with COPD on 4 L nasal cannula, hypertension, paroxysmal atrial fibrillation on Eliquis, chronic systolic heart failure, depression and anxiety, anemia who presented to emergency department due to worsening shortness of air/confusion.  Patient noncompliant with BiPAP at home.  Recent admission for COPD exacerbation with A-fib with RVR.    Upon ED presentation patient requiring 4 L nasal cannula which is baseline, otherwise hemodynamically stable.  Pertinent labs and imaging noted procalcitonin 0.07, WBC 5.67, initial troponin 17 with repeat 17, sodium 148, ABG noted compensated pH is 7.4 with pCO2 74.5, chest x-ray with no acute finding, CT of head negative for intracranial findings, CT abdomen pelvis chronic findings with no acute process.  Hospitalist consulted for further medical management. Pulmonology consulted for recommendations with hypercapnia and severe COPD.      Review of Systems:     All systems were reviewed and negative except as mentioned in subjective, assessment and plan.    Vital Signs:    Temp:  [97.3 °F (36.3 °C)-98.6 °F (37 °C)] 98.2 °F (36.8 °C)  Heart Rate:  [65-67] 65  Resp:  [18] 18  BP: (118-196)/(58-80) 118/58    Intake and output:    I/O last 3 completed shifts:  In: 1186 [P.O.:900; I.V.:286]  Out: -   I/O this shift:  In: 360 [P.O.:360]  Out: -     Physical  Examination:    General Appearance:  Alert and cooperative.  Chronically ill middle-age female.   Head:  Atraumatic and normocephalic.   Eyes: Conjunctivae and sclerae normal, no icterus. No pallor.   Throat: No oral lesions, no thrush, oral mucosa moist.   Neck: Supple, trachea midline, no thyromegaly.   Lungs:   Breath sounds heard bilaterally equally. Scant wheezing. No Pleural rub or bronchial breathing.  Slightly dyspneic on 3 L nasal cannula.   Heart:  Normal S1 and S2, no murmur, no gallop, no rub. No JVD.   Abdomen:   Normal bowel sounds, no masses, no organomegaly. Soft, nontender, nondistended, no rebound tenderness.   Extremities: Supple, no edema, no cyanosis, no clubbing.   Skin: No bleeding or rash.   Neurologic: Alert and oriented x 3. No facial asymmetry. Moves all four limbs. Tremors.  Severe generalized weakness.     Laboratory results:    Results from last 7 days   Lab Units 05/14/25  0735 05/13/25  0623 05/12/25  0540   SODIUM mmol/L 141 143 148*   POTASSIUM mmol/L 4.0 4.4 4.4   CHLORIDE mmol/L 97* 101 101   CO2 mmol/L 37.0* 39.2* 46.3*   BUN mg/dL 20 20 24*   CREATININE mg/dL 0.46* 0.46* 0.58   CALCIUM mg/dL 9.0 8.6 8.6   BILIRUBIN mg/dL  --   --  0.2   ALK PHOS U/L  --   --  107   ALT (SGPT) U/L  --   --  30   AST (SGOT) U/L  --   --  20   GLUCOSE mg/dL 130* 136* 105*     Results from last 7 days   Lab Units 05/14/25  0735 05/13/25  0623 05/12/25  0540   WBC 10*3/mm3 13.69* 6.77 5.67   HEMOGLOBIN g/dL 10.5* 10.1* 9.1*   HEMATOCRIT % 34.8 34.1 32.4*   PLATELETS 10*3/mm3 155 156 166     Results from last 7 days   Lab Units 05/12/25  0540   INR  1.13*     Results from last 7 days   Lab Units 05/12/25  0814 05/12/25  0540   HSTROP T ng/L 17* 17*         Recent Labs     01/02/25  1825 01/03/25  0012 05/03/25  1613 05/12/25  0558   PHART 7.365  --  7.350 7.410   [id number] 66.0*  --  85.4* 74.5*   PO2ART 84.0  --  74.2* 57.6*   [id number] 37.7*  --  47.1* 47.2*   BASEEXCESS 10.6* 7.7* 18.4* 19.7*      I  have reviewed the patient's laboratory results.    Radiology results:    No radiology results from the last 24 hrs    I have reviewed the patient's radiology reports.    Medication Review:     I have reviewed the patient's active and prn medications.     Problem List:      Altered mental status    COPD exacerbation    Altered mental status, unspecified      Assessment:    Chronic hypoxic hypercapnic respiratory failure  Acute exacerbation of COPD  Chronic systolic congestive heart failure  Atrial fibrillation on Eliquis  Hypertension  Hyperlipidemia  Tobacco use disorder  Osteoporosis  Osteoarthritis  Fibromyalgia  Anxiety  Depression  Vitamin D deficiency    Plan:    Chronic hypoxic/hypercapnic respiratory failure  - Will consult pulmonology at this time, patient noncompliant with BiPAP.  She is currently alert and oriented x 3.  Advised to discontinue trazodone at this time.  -NIV settings adjusted and encouraged per Pulmonology.  - Will continue IV steroids at this time.  Transition to oral as soon as possible as patient does have history of A-fib with RVR.  - Duonebs.   - Home medications as appropriate.  - Did discuss CODE STATUS in depth with last hospitalization, patient wishes to continue DNR/DNI status.    I have reviewed the copied text and it is accurate as of 5/14/2025     DVT Prophylaxis: Eliquis  Code Status: DNR/DNI  Diet: Regular  Discharge Plan: Home in 2-3 days    Britt Nascimento, APRN  05/14/25  12:44 EDT    Dictated utilizing Dragon dictation.

## 2025-05-14 NOTE — PLAN OF CARE
Problem: Violence Risk or Actual  Goal: Anger and Impulse Control  Outcome: Progressing  Intervention: Minimize Safety Risk  Recent Flowsheet Documentation  Taken 5/14/2025 1627 by Dewayne Marr RN  Enhanced Safety Measures: bed alarm set  Taken 5/14/2025 1421 by Dewayne Marr RN  Enhanced Safety Measures: bed alarm set  Taken 5/14/2025 1218 by Dewayne Marr RN  Enhanced Safety Measures:   bed alarm set   family to remain at bedside  Taken 5/14/2025 1000 by Dewayne Marr RN  Enhanced Safety Measures: bed alarm set  Taken 5/14/2025 0800 by Dewayne Marr RN  Enhanced Safety Measures: bed alarm set     Problem: Adult Inpatient Plan of Care  Goal: Plan of Care Review  Outcome: Progressing  Goal: Patient-Specific Goal (Individualized)  Outcome: Progressing  Goal: Absence of Hospital-Acquired Illness or Injury  Outcome: Progressing  Intervention: Identify and Manage Fall Risk  Recent Flowsheet Documentation  Taken 5/14/2025 1627 by Dewayne Marr RN  Safety Promotion/Fall Prevention: safety round/check completed  Taken 5/14/2025 1421 by Dewayne Marr RN  Safety Promotion/Fall Prevention: safety round/check completed  Taken 5/14/2025 1218 by Dewayne Marr RN  Safety Promotion/Fall Prevention: safety round/check completed  Taken 5/14/2025 1000 by Dewayne Marr RN  Safety Promotion/Fall Prevention: safety round/check completed  Taken 5/14/2025 0800 by Dewayne Marr RN  Safety Promotion/Fall Prevention: safety round/check completed  Intervention: Prevent Skin Injury  Recent Flowsheet Documentation  Taken 5/14/2025 1627 by Dewayne Marr RN  Body Position:   supine   legs elevated   position changed independently  Taken 5/14/2025 1421 by Dewayne Marr RN  Body Position:   side-lying   weight shifting   right   position changed independently  Taken 5/14/2025 1218 by Dewayne Marr RN  Body Position:   weight shifting   sitting up in bed   position changed independently  Taken 5/14/2025 1000 by Dewayne Marr  RN  Body Position:   side-lying   left  Taken 5/14/2025 0800 by Dewayne Marr RN  Body Position:   position changed independently   side-lying   right  Skin Protection: incontinence pads utilized  Intervention: Prevent and Manage VTE (Venous Thromboembolism) Risk  Recent Flowsheet Documentation  Taken 5/14/2025 0800 by Dewayne Marr RN  VTE Prevention/Management: (see mar) other (see comments)  Goal: Optimal Comfort and Wellbeing  Outcome: Progressing  Intervention: Provide Person-Centered Care  Recent Flowsheet Documentation  Taken 5/14/2025 0800 by Dewayne Marr RN  Trust Relationship/Rapport:   care explained   choices provided   emotional support provided   empathic listening provided   questions answered   questions encouraged   reassurance provided   thoughts/feelings acknowledged  Goal: Readiness for Transition of Care  Outcome: Progressing     Problem: Fall Injury Risk  Goal: Absence of Fall and Fall-Related Injury  Outcome: Progressing  Intervention: Identify and Manage Contributors  Recent Flowsheet Documentation  Taken 5/14/2025 0800 by Dewayne Marr RN  Medication Review/Management: medications reviewed  Intervention: Promote Injury-Free Environment  Recent Flowsheet Documentation  Taken 5/14/2025 1627 by Dewayne Marr RN  Safety Promotion/Fall Prevention: safety round/check completed  Taken 5/14/2025 1421 by Dewayne Marr RN  Safety Promotion/Fall Prevention: safety round/check completed  Taken 5/14/2025 1218 by Dewayne Marr RN  Safety Promotion/Fall Prevention: safety round/check completed  Taken 5/14/2025 1000 by Dewayne Marr RN  Safety Promotion/Fall Prevention: safety round/check completed  Taken 5/14/2025 0800 by Dewayne Marr RN  Safety Promotion/Fall Prevention: safety round/check completed     Problem: Skin Injury Risk Increased  Goal: Skin Health and Integrity  Outcome: Progressing  Intervention: Optimize Skin Protection  Recent Flowsheet Documentation  Taken 5/14/2025 1627 by Tejinder  EFRAÍN Buchanan  Activity Management: activity encouraged  Head of Bed (HOB) Positioning: HOB lowered  Taken 5/14/2025 1421 by Dewayne Marr RN  Activity Management: activity encouraged  Head of Bed (HOB) Positioning: HOB lowered  Taken 5/14/2025 1218 by Dewayne Marr RN  Activity Management: activity encouraged  Head of Bed (HOB) Positioning: HOB elevated  Taken 5/14/2025 1000 by Dewayne Marr RN  Activity Management: activity encouraged  Head of Bed (HOB) Positioning: HOB lowered  Taken 5/14/2025 0800 by Dewayne Marr RN  Activity Management: activity encouraged  Pressure Reduction Techniques:   frequent weight shift encouraged   pressure points protected  Head of Bed (HOB) Positioning: HOB lowered  Pressure Reduction Devices: positioning supports utilized  Skin Protection: incontinence pads utilized     Problem: Comorbidity Management  Goal: Maintenance of COPD Symptom Control  Outcome: Progressing  Intervention: Maintain COPD (Chronic Obstructive Pulmonary Disease) Symptom Control  Recent Flowsheet Documentation  Taken 5/14/2025 0800 by Dewayne Marr RN  Medication Review/Management: medications reviewed  Goal: Maintenance of Heart Failure Symptom Control  Outcome: Progressing  Intervention: Maintain Heart Failure Management  Recent Flowsheet Documentation  Taken 5/14/2025 0800 by Dewayne Marr RN  Medication Review/Management: medications reviewed  Goal: Blood Pressure in Desired Range  Outcome: Progressing  Intervention: Maintain Blood Pressure Management  Recent Flowsheet Documentation  Taken 5/14/2025 0800 by Dewayne Marr RN  Medication Review/Management: medications reviewed     Problem: Noninvasive Ventilation Acute  Goal: Effective Unassisted Ventilation and Oxygenation  Outcome: Progressing  Goal: Effective Unassisted Ventilation and Oxygenation  Outcome: Progressing   Goal Outcome Evaluation:

## 2025-05-14 NOTE — PROGRESS NOTES
RT EQUIPMENT DEVICE RELATED - SKIN ASSESSMENT    Jed Score:  Jed Score: 19     RT Medical Equipment/Device:     NIV Mask:  Under-the-nose   size:  b    Skin Assessment:      Nose:  Intact    Device Skin Pressure Protection:  Pressure points protected    Nurse Notification:  Cora Celeste, RRT

## 2025-05-15 LAB
A-A DO2: 108.9 MMHG
ANION GAP SERPL CALCULATED.3IONS-SCNC: 6.8 MMOL/L (ref 5–15)
ARTERIAL PATENCY WRIST A: ABNORMAL
ATMOSPHERIC PRESS: 727 MMHG
BASE EXCESS BLDA CALC-SCNC: 14 MMOL/L (ref 0–2)
BASOPHILS # BLD AUTO: 0.02 10*3/MM3 (ref 0–0.2)
BASOPHILS NFR BLD AUTO: 0.1 % (ref 0–1.5)
BDY SITE: ABNORMAL
BUN SERPL-MCNC: 30 MG/DL (ref 8–23)
BUN/CREAT SERPL: 50.8 (ref 7–25)
CALCIUM SPEC-SCNC: 9.2 MG/DL (ref 8.6–10.5)
CHLORIDE SERPL-SCNC: 97 MMOL/L (ref 98–107)
CO2 SERPL-SCNC: 37.2 MMOL/L (ref 22–29)
COHGB MFR BLD: 0.9 % (ref 0–2)
CREAT SERPL-MCNC: 0.59 MG/DL (ref 0.57–1)
DEPRECATED RDW RBC AUTO: 51.5 FL (ref 37–54)
EGFRCR SERPLBLD CKD-EPI 2021: 92.4 ML/MIN/1.73
EOSINOPHIL # BLD AUTO: 0 10*3/MM3 (ref 0–0.4)
EOSINOPHIL NFR BLD AUTO: 0 % (ref 0.3–6.2)
ERYTHROCYTE [DISTWIDTH] IN BLOOD BY AUTOMATED COUNT: 15.9 % (ref 12.3–15.4)
GLUCOSE SERPL-MCNC: 150 MG/DL (ref 65–99)
HCO3 BLDA-SCNC: 40.7 MMOL/L (ref 22–28)
HCT VFR BLD AUTO: 36.3 % (ref 34–46.6)
HCT VFR BLD CALC: 32.6 %
HGB BLD-MCNC: 10.8 G/DL (ref 12–15.9)
IMM GRANULOCYTES # BLD AUTO: 0.11 10*3/MM3 (ref 0–0.05)
IMM GRANULOCYTES NFR BLD AUTO: 0.7 % (ref 0–0.5)
INHALED O2 CONCENTRATION: 50 %
LYMPHOCYTES # BLD AUTO: 0.44 10*3/MM3 (ref 0.7–3.1)
LYMPHOCYTES NFR BLD AUTO: 2.7 % (ref 19.6–45.3)
MCH RBC QN AUTO: 26.3 PG (ref 26.6–33)
MCHC RBC AUTO-ENTMCNC: 29.8 G/DL (ref 31.5–35.7)
MCV RBC AUTO: 88.5 FL (ref 79–97)
METHGB BLD QL: 0.6 % (ref 0–1.5)
MODALITY: ABNORMAL
MONOCYTES # BLD AUTO: 0.18 10*3/MM3 (ref 0.1–0.9)
MONOCYTES NFR BLD AUTO: 1.1 % (ref 5–12)
NEUTROPHILS NFR BLD AUTO: 15.27 10*3/MM3 (ref 1.7–7)
NEUTROPHILS NFR BLD AUTO: 95.4 % (ref 42.7–76)
NRBC BLD AUTO-RTO: 0 /100 WBC (ref 0–0.2)
OXYHGB MFR BLDV: 98.4 % (ref 94–99)
PCO2 BLDA: 62.2 MM HG (ref 35–45)
PCO2 TEMP ADJ BLD: ABNORMAL MM[HG]
PH BLDA: 7.42 PH UNITS (ref 7.3–7.5)
PH, TEMP CORRECTED: ABNORMAL
PLATELET # BLD AUTO: 222 10*3/MM3 (ref 140–450)
PMV BLD AUTO: 11.1 FL (ref 6–12)
PO2 BLDA: 164 MM HG (ref 75–100)
PO2 TEMP ADJ BLD: ABNORMAL MM[HG]
POTASSIUM SERPL-SCNC: 3.5 MMOL/L (ref 3.5–5.2)
POTASSIUM SERPL-SCNC: 5.4 MMOL/L (ref 3.5–5.2)
POTASSIUM SERPL-SCNC: 6.2 MMOL/L (ref 3.5–5.2)
RBC # BLD AUTO: 4.1 10*6/MM3 (ref 3.77–5.28)
SAO2 % BLDCOA: 99.9 % (ref 94–100)
SODIUM SERPL-SCNC: 141 MMOL/L (ref 136–145)
VENTILATOR MODE: ABNORMAL
WBC NRBC COR # BLD AUTO: 16.02 10*3/MM3 (ref 3.4–10.8)

## 2025-05-15 PROCEDURE — 63710000001 PREDNISONE PER 1 MG: Performed by: INTERNAL MEDICINE

## 2025-05-15 PROCEDURE — 83050 HGB METHEMOGLOBIN QUAN: CPT

## 2025-05-15 PROCEDURE — 84132 ASSAY OF SERUM POTASSIUM: CPT | Performed by: NURSE PRACTITIONER

## 2025-05-15 PROCEDURE — 99232 SBSQ HOSP IP/OBS MODERATE 35: CPT | Performed by: INTERNAL MEDICINE

## 2025-05-15 PROCEDURE — 94664 DEMO&/EVAL PT USE INHALER: CPT

## 2025-05-15 PROCEDURE — 85025 COMPLETE CBC W/AUTO DIFF WBC: CPT | Performed by: NURSE PRACTITIONER

## 2025-05-15 PROCEDURE — 25010000002 ONDANSETRON PER 1 MG: Performed by: FAMILY MEDICINE

## 2025-05-15 PROCEDURE — 94761 N-INVAS EAR/PLS OXIMETRY MLT: CPT

## 2025-05-15 PROCEDURE — 84132 ASSAY OF SERUM POTASSIUM: CPT | Performed by: FAMILY MEDICINE

## 2025-05-15 PROCEDURE — 36600 WITHDRAWAL OF ARTERIAL BLOOD: CPT

## 2025-05-15 PROCEDURE — 63710000001 REVEFENACIN 175 MCG/3ML SOLUTION: Performed by: INTERNAL MEDICINE

## 2025-05-15 PROCEDURE — 94660 CPAP INITIATION&MGMT: CPT

## 2025-05-15 PROCEDURE — 80048 BASIC METABOLIC PNL TOTAL CA: CPT | Performed by: NURSE PRACTITIONER

## 2025-05-15 PROCEDURE — 94799 UNLISTED PULMONARY SVC/PX: CPT

## 2025-05-15 PROCEDURE — 82375 ASSAY CARBOXYHB QUANT: CPT

## 2025-05-15 PROCEDURE — 94760 N-INVAS EAR/PLS OXIMETRY 1: CPT

## 2025-05-15 PROCEDURE — 25010000002 METHYLPREDNISOLONE PER 40 MG: Performed by: NURSE PRACTITIONER

## 2025-05-15 PROCEDURE — 99232 SBSQ HOSP IP/OBS MODERATE 35: CPT | Performed by: NURSE PRACTITIONER

## 2025-05-15 PROCEDURE — 82805 BLOOD GASES W/O2 SATURATION: CPT

## 2025-05-15 RX ORDER — POTASSIUM CHLORIDE 1500 MG/1
40 TABLET, EXTENDED RELEASE ORAL EVERY 4 HOURS
Status: COMPLETED | OUTPATIENT
Start: 2025-05-15 | End: 2025-05-15

## 2025-05-15 RX ORDER — PREDNISONE 20 MG/1
40 TABLET ORAL
Status: DISCONTINUED | OUTPATIENT
Start: 2025-05-15 | End: 2025-05-16 | Stop reason: HOSPADM

## 2025-05-15 RX ADMIN — APIXABAN 5 MG: 5 TABLET, FILM COATED ORAL at 08:29

## 2025-05-15 RX ADMIN — REVEFENACIN 175 MCG: 175 SOLUTION RESPIRATORY (INHALATION) at 06:45

## 2025-05-15 RX ADMIN — Medication 10 ML: at 08:29

## 2025-05-15 RX ADMIN — POTASSIUM CHLORIDE 40 MEQ: 1500 TABLET, EXTENDED RELEASE ORAL at 11:57

## 2025-05-15 RX ADMIN — ARFORMOTEROL TARTRATE 15 MCG: 15 SOLUTION RESPIRATORY (INHALATION) at 18:44

## 2025-05-15 RX ADMIN — DULOXETINE 60 MG: 30 CAPSULE, DELAYED RELEASE ORAL at 08:29

## 2025-05-15 RX ADMIN — ATORVASTATIN CALCIUM 40 MG: 40 TABLET, FILM COATED ORAL at 08:29

## 2025-05-15 RX ADMIN — METOPROLOL SUCCINATE 50 MG: 50 TABLET, EXTENDED RELEASE ORAL at 08:29

## 2025-05-15 RX ADMIN — PREDNISONE 40 MG: 20 TABLET ORAL at 12:52

## 2025-05-15 RX ADMIN — DIAZEPAM 5 MG: 5 TABLET ORAL at 12:52

## 2025-05-15 RX ADMIN — POTASSIUM CHLORIDE 40 MEQ: 1500 TABLET, EXTENDED RELEASE ORAL at 16:12

## 2025-05-15 RX ADMIN — Medication 1 TABLET: at 08:29

## 2025-05-15 RX ADMIN — ARFORMOTEROL TARTRATE 15 MCG: 15 SOLUTION RESPIRATORY (INHALATION) at 06:45

## 2025-05-15 RX ADMIN — METHYLPREDNISOLONE SODIUM SUCCINATE 40 MG: 40 INJECTION, POWDER, FOR SOLUTION INTRAMUSCULAR; INTRAVENOUS at 06:20

## 2025-05-15 RX ADMIN — SERTRALINE 150 MG: 50 TABLET, FILM COATED ORAL at 20:00

## 2025-05-15 RX ADMIN — ONDANSETRON 4 MG: 2 INJECTION INTRAMUSCULAR; INTRAVENOUS at 17:03

## 2025-05-15 RX ADMIN — DULOXETINE 60 MG: 30 CAPSULE, DELAYED RELEASE ORAL at 20:00

## 2025-05-15 RX ADMIN — APIXABAN 5 MG: 5 TABLET, FILM COATED ORAL at 20:00

## 2025-05-15 RX ADMIN — DIAZEPAM 5 MG: 5 TABLET ORAL at 20:00

## 2025-05-15 RX ADMIN — BUDESONIDE 1 MG: 0.5 SUSPENSION RESPIRATORY (INHALATION) at 06:45

## 2025-05-15 RX ADMIN — GUAIFENESIN 600 MG: 600 TABLET, EXTENDED RELEASE ORAL at 20:00

## 2025-05-15 RX ADMIN — Medication 10 ML: at 20:00

## 2025-05-15 RX ADMIN — PANTOPRAZOLE SODIUM 40 MG: 40 TABLET, DELAYED RELEASE ORAL at 08:29

## 2025-05-15 RX ADMIN — GUAIFENESIN 600 MG: 600 TABLET, EXTENDED RELEASE ORAL at 08:29

## 2025-05-15 RX ADMIN — BUDESONIDE 1 MG: 0.5 SUSPENSION RESPIRATORY (INHALATION) at 18:44

## 2025-05-15 RX ADMIN — AMIODARONE HYDROCHLORIDE 200 MG: 200 TABLET ORAL at 08:29

## 2025-05-15 NOTE — PROGRESS NOTES
[facility] HOSPITALIST    PROGRESS NOTE    Name:  Gabi Dudley   Age:  80 y.o.  Sex:  female  :  1945  MRN:  [id number]   Visit Number:  [id number]  Admission Date:  2025  Date Of Service:  05/15/25  Primary Care Physician:  Krystina Saunders DO     LOS: 3 days :    Chief Complaint:      Dyspnea    Subjective:    Patient seen and examined.  States that she did wear her AVAPS overnight on and off.  Feels better this morning.    Hospital Course:    Ms. Reed is a pleasant 80-year-old female with pertinent past medical history of chronic respiratory failure with COPD on 4 L nasal cannula, hypertension, paroxysmal atrial fibrillation on Eliquis, chronic systolic heart failure, depression and anxiety, anemia who presented to emergency department due to worsening shortness of air/confusion.  Patient noncompliant with BiPAP at home.  Recent admission for COPD exacerbation with A-fib with RVR.    Upon ED presentation patient requiring 4 L nasal cannula which is baseline, otherwise hemodynamically stable.  Pertinent labs and imaging noted procalcitonin 0.07, WBC 5.67, initial troponin 17 with repeat 17, sodium 148, ABG noted compensated pH is 7.4 with pCO2 74.5, chest x-ray with no acute finding, CT of head negative for intracranial findings, CT abdomen pelvis chronic findings with no acute process.  Hospitalist consulted for further medical management. Pulmonology consulted for recommendations with hypercapnia and severe COPD.  AVAPS ordered for which patient tolerated well with overall improvement in ABG noted.      Review of Systems:     All systems were reviewed and negative except as mentioned in subjective, assessment and plan.    Vital Signs:    Temp:  [96.7 °F (35.9 °C)-98.4 °F (36.9 °C)] 98.4 °F (36.9 °C)  Heart Rate:  [59-67] 67  Resp:  [16-20] 20  BP: (114-172)/(60-81) 114/62    Intake and output:    I/O last 3 completed shifts:  In: 760 [P.O.:760]  Out: -   I/O this shift:  In:  120 [P.O.:120]  Out: -     Physical Examination:    General Appearance:  Alert and cooperative.  Chronically ill middle-age female.   Head:  Atraumatic and normocephalic.   Eyes: Conjunctivae and sclerae normal, no icterus. No pallor.   Throat: No oral lesions, no thrush, oral mucosa moist.   Neck: Supple, trachea midline, no thyromegaly.   Lungs:   Breath sounds heard bilaterally equally. Scant wheezing. No Pleural rub or bronchial breathing.  Slightly dyspneic on 3 L nasal cannula.   Heart:  Normal S1 and S2, no murmur, no gallop, no rub. No JVD.   Abdomen:   Normal bowel sounds, no masses, no organomegaly. Soft, nontender, nondistended, no rebound tenderness.   Extremities: Supple, no edema, no cyanosis, no clubbing.   Skin: No bleeding or rash.   Neurologic: Alert and oriented x 3. No facial asymmetry. Moves all four limbs. Tremors.  Severe generalized weakness.     Laboratory results:    Results from last 7 days   Lab Units 05/15/25  0827 05/14/25  0735 05/13/25  0623 05/12/25  0540   SODIUM mmol/L 141 141 143 148*   POTASSIUM mmol/L 3.5 4.0 4.4 4.4   CHLORIDE mmol/L 97* 97* 101 101   CO2 mmol/L 37.2* 37.0* 39.2* 46.3*   BUN mg/dL 30* 20 20 24*   CREATININE mg/dL 0.59 0.46* 0.46* 0.58   CALCIUM mg/dL 9.2 9.0 8.6 8.6   BILIRUBIN mg/dL  --   --   --  0.2   ALK PHOS U/L  --   --   --  107   ALT (SGPT) U/L  --   --   --  30   AST (SGOT) U/L  --   --   --  20   GLUCOSE mg/dL 150* 130* 136* 105*     Results from last 7 days   Lab Units 05/15/25  0827 05/14/25  0735 05/13/25  0623   WBC 10*3/mm3 16.02* 13.69* 6.77   HEMOGLOBIN g/dL 10.8* 10.5* 10.1*   HEMATOCRIT % 36.3 34.8 34.1   PLATELETS 10*3/mm3 222 155 156     Results from last 7 days   Lab Units 05/12/25  0540   INR  1.13*     Results from last 7 days   Lab Units 05/12/25  0814 05/12/25  0540   HSTROP T ng/L 17* 17*         Recent Labs     05/03/25  1613 05/12/25  0558 05/15/25  0651   PHART 7.350 7.410 7.424   [id number] 85.4* 74.5* 62.2*   PO2ART 74.2* 57.6*  164.0*   NSU2TFG 47.1* 47.2* 40.7*   BASEEXCESS 18.4* 19.7* 14.0*      I have reviewed the patient's laboratory results.    Radiology results:    No radiology results from the last 24 hrs    I have reviewed the patient's radiology reports.    Medication Review:     I have reviewed the patient's active and prn medications.     Problem List:      Altered mental status    COPD exacerbation    Altered mental status, unspecified      Assessment:    Chronic hypoxic hypercapnic respiratory failure  Acute exacerbation of COPD  Chronic systolic congestive heart failure  Atrial fibrillation on Eliquis  Hypertension  Hyperlipidemia  Tobacco use disorder  Osteoporosis  Osteoarthritis  Fibromyalgia  Anxiety  Depression  Vitamin D deficiency    Plan:    Chronic hypoxic/hypercapnic respiratory failure  - Pulmonology following.  Patient noncompliant with BiPAP as she is not able to tolerate.  She is currently alert and oriented x 3.  Advised to discontinue trazodone at this time.  -AVAPS ordered per pulmonology with patient tolerating well, CO2 did improve with morning ABG.  - Continue oral prednisone.  - Duonebs.   - Home medications as appropriate.  - Did discuss CODE STATUS in depth with last hospitalization, patient wishes to continue DNR/DNI status.    -Patient otherwise medically stable at this time.  Will continue prednisone and encourage AVAPS use.  Will continue home health upon discharge.  Discharge pending delivery of AVAPS at this time.    I have reviewed the copied text and it is accurate as of 5/15/2025     DVT Prophylaxis: Eliquis  Code Status: DNR/DNI  Diet: Regular  Discharge Plan: Home in 1 to 2 days.    Britt Nascimento, APRN  05/15/25  12:45 EDT    Dictated utilizing Dragon dictation.

## 2025-05-15 NOTE — PLAN OF CARE
Problem: Noninvasive Ventilation Acute  Goal: Effective Unassisted Ventilation and Oxygenation  Outcome: Progressing   Goal Outcome Evaluation:                                          RT EQUIPMENT DEVICE RELATED - SKIN ASSESSMENT    Jed Score:  Jed Score: 18     RT Medical Equipment/Device:     NIV Mask:  Under-the-nose   size:  b    Skin Assessment:      Nose:  Intact    Device Skin Pressure Protection:  Pressure points protected    Nurse Notification:  Cora Joel, CRT

## 2025-05-15 NOTE — CASE MANAGEMENT/SOCIAL WORK
Case Management/Social Work    Patient Name:  Gabi Dudley  YOB: 1945  MRN: [id number]  Admit Date:  5/12/2025        11:51 EDT   Discharge Plan       Row Name 05/15/25 1151       Plan    Plan Home with daughter and [facility] once AVAPS delivered.                  11:52 EDT  Called [facility] and they stated they did not see patient on their current patient list. Checked patient's equipment in the room and attempted to call Laith Garrido, MARTHA, who was listed on her equipment. Number is out of service. Contacted Rosalie with [facility] to set up her new AVAPS.        Electronically signed by:  Jesus Manuel Mathis RN  05/15/25 11:51 EDT

## 2025-05-15 NOTE — PLAN OF CARE
Problem: Violence Risk or Actual  Goal: Anger and Impulse Control  Outcome: Progressing     Problem: Adult Inpatient Plan of Care  Goal: Plan of Care Review  Outcome: Progressing  Goal: Patient-Specific Goal (Individualized)  Outcome: Progressing  Goal: Absence of Hospital-Acquired Illness or Injury  Outcome: Progressing  Intervention: Identify and Manage Fall Risk  Recent Flowsheet Documentation  Taken 5/15/2025 1400 by Susan Aguilar RN  Safety Promotion/Fall Prevention: safety round/check completed  Taken 5/15/2025 1000 by Susan Aguilar RN  Safety Promotion/Fall Prevention: safety round/check completed  Taken 5/15/2025 0825 by Susan Aguilar RN  Safety Promotion/Fall Prevention: safety round/check completed  Intervention: Prevent Skin Injury  Recent Flowsheet Documentation  Taken 5/15/2025 1400 by Susan Aguilar RN  Body Position:   position changed independently   weight shifting  Taken 5/15/2025 1000 by Susan Aguilar RN  Body Position:   position changed independently   weight shifting   tilted   right  Taken 5/15/2025 0825 by Susan Aguilar RN  Body Position:   position changed independently   weight shifting  Intervention: Prevent and Manage VTE (Venous Thromboembolism) Risk  Recent Flowsheet Documentation  Taken 5/15/2025 0825 by Susan Aguilar RN  VTE Prevention/Management: (see MAR) other (see comments)  Intervention: Prevent Infection  Recent Flowsheet Documentation  Taken 5/15/2025 1400 by Susan Aguilar RN  Infection Prevention: single patient room provided  Taken 5/15/2025 1000 by Susan Aguilar RN  Infection Prevention: single patient room provided  Taken 5/15/2025 0825 by Susan Aguilar RN  Infection Prevention: single patient room provided  Goal: Optimal Comfort and Wellbeing  Outcome: Progressing  Intervention: Provide Person-Centered Care  Recent Flowsheet Documentation  Taken 5/15/2025 0825 by Susan Aguilar RN  Trust Relationship/Rapport:   care explained   choices  provided  Goal: Readiness for Transition of Care  Outcome: Progressing     Problem: Fall Injury Risk  Goal: Absence of Fall and Fall-Related Injury  Outcome: Progressing  Intervention: Identify and Manage Contributors  Recent Flowsheet Documentation  Taken 5/15/2025 1400 by Susan Aguilar RN  Medication Review/Management: medications reviewed  Taken 5/15/2025 1000 by Susan Aguilar RN  Medication Review/Management: medications reviewed  Taken 5/15/2025 0825 by Susan Aguilar RN  Medication Review/Management: medications reviewed  Intervention: Promote Injury-Free Environment  Recent Flowsheet Documentation  Taken 5/15/2025 1400 by Susan Aguilar RN  Safety Promotion/Fall Prevention: safety round/check completed  Taken 5/15/2025 1000 by Susan Aguilar RN  Safety Promotion/Fall Prevention: safety round/check completed  Taken 5/15/2025 0825 by Susan Aguilar RN  Safety Promotion/Fall Prevention: safety round/check completed     Problem: Skin Injury Risk Increased  Goal: Skin Health and Integrity  Outcome: Progressing  Intervention: Optimize Skin Protection  Recent Flowsheet Documentation  Taken 5/15/2025 1400 by Susan Aguilar RN  Activity Management: activity encouraged  Taken 5/15/2025 1000 by Susan Aguilar RN  Activity Management: activity encouraged  Taken 5/15/2025 0825 by Susan Aguilar RN  Activity Management: activity encouraged     Problem: Comorbidity Management  Goal: Maintenance of COPD Symptom Control  Outcome: Progressing  Intervention: Maintain COPD (Chronic Obstructive Pulmonary Disease) Symptom Control  Recent Flowsheet Documentation  Taken 5/15/2025 1400 by Susan Aguilar RN  Medication Review/Management: medications reviewed  Taken 5/15/2025 1000 by Susan Aguilar RN  Medication Review/Management: medications reviewed  Taken 5/15/2025 0825 by Susan Aguilar RN  Medication Review/Management: medications reviewed  Goal: Maintenance of Heart Failure Symptom Control  Outcome:  Progressing  Intervention: Maintain Heart Failure Management  Recent Flowsheet Documentation  Taken 5/15/2025 1400 by Susan Aguilar RN  Medication Review/Management: medications reviewed  Taken 5/15/2025 1000 by Susan Aguilar RN  Medication Review/Management: medications reviewed  Taken 5/15/2025 0825 by Susan Aguilar RN  Medication Review/Management: medications reviewed  Goal: Blood Pressure in Desired Range  Outcome: Progressing  Intervention: Maintain Blood Pressure Management  Recent Flowsheet Documentation  Taken 5/15/2025 1400 by Susan Aguilar RN  Medication Review/Management: medications reviewed  Taken 5/15/2025 1000 by Susan Aguilar RN  Medication Review/Management: medications reviewed  Taken 5/15/2025 0825 by Susan Aguilar RN  Medication Review/Management: medications reviewed     Problem: Noninvasive Ventilation Acute  Goal: Effective Unassisted Ventilation and Oxygenation  Outcome: Progressing  Intervention: Monitor and Manage Noninvasive Ventilation  Recent Flowsheet Documentation  Taken 5/15/2025 0825 by Susan Aguilar RN  Airway/Ventilation Management:   positive pressure ventilation provided   oxygen therapy provided  Goal: Effective Unassisted Ventilation and Oxygenation  Outcome: Progressing  Intervention: Monitor and Manage Noninvasive Ventilation  Recent Flowsheet Documentation  Taken 5/15/2025 0825 by Susan Aguilar RN  Airway/Ventilation Management:   positive pressure ventilation provided   oxygen therapy provided   Goal Outcome Evaluation:

## 2025-05-15 NOTE — PROGRESS NOTES
"  CC: Acute Respiratory Failure.     S: Used NIV overnight and now on 2LNC. PCO2 improved with AVAPS.  Overall, feeling better.  States she is tired of people asking her if she wore her mask overnight.     ROS: Intermittent dry cough, no current wheezing, no fevers or chills, no chest pain     O:Vital signs reviewed.   /66 (BP Location: Left arm, Patient Position: Sitting)   Pulse 67   Temp 97.6 °F (36.4 °C) (Oral)   Resp 18   Ht 165.1 cm (65\")   Wt 60.4 kg (133 lb 2.5 oz)   SpO2 100%   BMI 22.16 kg/m²     Temp (24hrs), Av.5 °F (36.4 °C), Min:96.7 °F (35.9 °C), Max:98.2 °F (36.8 °C)      I & Os reviewed.   Intake/Output         25 0700 - 05/15/25 0659 05/15/25 0700 - 25 0659    Intake (ml) 580 120    Output (ml) -- --    Net (ml) 580 120    Last Weight 60.4 kg (133 lb 2.5 oz) --            Net IO Since Admission: 2,086 mL [05/15/25 0858]    General/Constitutional: Appears chronically ill, on nasal cannula, no respiratory distress when speaking in complete sentences   eyes: Extraocular muscles are intact, pupils equal  Neck: Supple without JVD. No obvious masses noted.   Cardiovascular: S1 + S2.  Regular rate   lungs/Respiratory: Decreased breath sounds throughout with scattered wheezing   GI/Abdomen: Soft. Bowel sounds present, no obvious tenderness to palpation  Musculoskeletal/Extremities: No edema noted.  No cyanosis.    Neurologic: Was able to follow commands.  Spontaneously moving all 4 extremities, answering questions appropriately   psych: AAOx3.     Labs: Reviewed.   Results from last 7 days   Lab Units 25  0735 25  0623 25  0540   WBC 10*3/mm3 13.69* 6.77 5.67   HEMOGLOBIN g/dL 10.5* 10.1* 9.1*   HEMATOCRIT % 34.8 34.1 32.4*   PLATELETS 10*3/mm3 155 156 166   NEUTROPHIL % % 92.2* 86.2* 62.9   NEUTROS ABS 10*3/mm3 12.62* 5.83 3.57   EOSINOPHIL % % 0.0* 0.0* 2.5   EOS ABS 10*3/mm3 0.00 0.00 0.14   LYMPHOCYTE % % 5.0* 9.7* 24.7   LYMPHS ABS 10*3/mm3 0.68* 0.66* " "1.40       Lab Results   Component Value Date    PROCALCITO 0.07 05/12/2025    PROCALCITO 0.06 04/09/2025    PROCALCITO 0.07 01/02/2025       Lab Results   Component Value Date    CRP 3.48 (H) 05/12/2025    CRP 1.14 (H) 04/09/2025       No results found for: \"SEDRATE\"    Lab Results   Component Value Date    PROBNP 2,052.0 (H) 05/03/2025    PROBNP 2,479.0 (H) 04/09/2025    PROBNP 1,444.0 12/20/2024       Results from last 7 days   Lab Units 05/14/25  0735 05/13/25  0623 05/12/25  0540   SODIUM mmol/L 141 143 148*   POTASSIUM mmol/L 4.0 4.4 4.4   CHLORIDE mmol/L 97* 101 101   CO2 mmol/L 37.0* 39.2* 46.3*   BUN mg/dL 20 20 24*   CREATININE mg/dL 0.46* 0.46* 0.58   CALCIUM mg/dL 9.0 8.6 8.6   ANION GAP mmol/L 7.0 2.8* 0.7*   BILIRUBIN mg/dL  --   --  0.2   ALK PHOS U/L  --   --  107   ALT (SGPT) U/L  --   --  30   AST (SGOT) U/L  --   --  20   GLUCOSE mg/dL 130* 136* 105*   TOTAL PROTEIN g/dL  --   --  5.6*   ALBUMIN g/dL  --   --  3.4*       Results from last 7 days   Lab Units 05/12/25  0540   MAGNESIUM mg/dL 2.1       Lab Results   Component Value Date    TSH 4.360 (H) 05/12/2025    TSH 7.510 (H) 05/12/2025    TSH 0.633 05/06/2022       Lab Results   Component Value Date    FREET4 1.24 05/12/2025       Results from last 7 days   Lab Units 05/12/25  0540   INR  1.13*       Lab Results   Component Value Date    CKTOTAL 19 (L) 01/02/2025    CKTOTAL 126 11/16/2021    CKTOTAL 121 10/27/2020       No components found for: \"HSTROPT\"    Lab Results   Component Value Date    TROPONINT 17 (H) 05/12/2025    TROPONINT 17 (H) 05/12/2025    TROPONINT 13 05/03/2025       No results found for: \"DDIMER\"    Lab Results   Component Value Date    LIPASE 10 (L) 07/22/2024    LIPASE 17 06/24/2024    LIPASE 23 08/07/2023       Brief Urine Lab Results  (Last result in the past 365 days)        Color   Clarity   Blood   Leuk Est   Nitrite   Protein   CREAT   Urine HCG        05/12/25 0545 Yellow   Clear   Negative   Negative   Negative   " "Negative                     Micro: As of May 15, 2025   No results found for: \"RESPCX\"  No results found for: \"BCIDPCR\"  Lab Results   Component Value Date    BLOODCX No growth at 5 days 01/02/2025    BLOODCX No growth at 5 days 01/02/2025    BLOODCX No growth at 5 days 07/22/2024    BLOODCX No growth at 5 days 07/22/2024     Lab Results   Component Value Date    URINECX No growth 07/24/2024    URINECX 25,000 CFU/mL Mixed Doris Isolated 07/10/2024    URINECX Final report 01/09/2024     No results found for: \"MRSACX\"  Lab Results   Component Value Date    MRSAPCR No MRSA Detected 08/13/2023    MRSAPCR MRSA Detected (A) 05/23/2023     No results found for: \"URCX\"  No components found for: \"LOWRESPCF\"  No results found for: \"THROATCX\"  No results found for: \"CULTURES\"  No components found for: \"STREPBCX\"  No results found for: \"STREPPNEUAG\"  No results found for: \"LEGIONELLA\"  No results found for: \"LEGANTIGENUR\"  No results found for: \"MYCOPLASCX\"  No results found for: \"GCCX\"  No results found for: \"WOUNDCX\"  No results found for: \"BODYFLDCX\"    Lab Results   Component Value Date    FLU Negative 01/22/2018    FLU Negative 01/22/2018       Lab Results   Component Value Date    ADENOVIRUS Not Detected 05/03/2025     Lab Results   Component Value Date    GG959L Not Detected 05/03/2025     Lab Results   Component Value Date    CVHKU1 Not Detected 05/03/2025     Lab Results   Component Value Date    CVNL63 Not Detected 05/03/2025     Lab Results   Component Value Date    CVOC43 Not Detected 05/03/2025     Lab Results   Component Value Date    HUMETPNEVS Not Detected 05/03/2025     Lab Results   Component Value Date    HURVEV Not Detected 05/03/2025     Lab Results   Component Value Date    FLUBPCR Not Detected 05/03/2025     Lab Results   Component Value Date    PARAINFLUE Not Detected 05/03/2025     Lab Results   Component Value Date    PARAFLUV2 Not Detected 05/03/2025     Lab Results   Component Value Date    " "PARAFLUV3 Not Detected 05/03/2025     Lab Results   Component Value Date    PARAFLUV4 Not Detected 05/03/2025     Lab Results   Component Value Date    BPERTPCR Not Detected 05/03/2025     No results found for: \"[id number]\"  Lab Results   Component Value Date    CPNEUPCR Not Detected 05/03/2025     Lab Results   Component Value Date    MPNEUMO Not Detected 05/03/2025     Lab Results   Component Value Date    FLUAPCR Not Detected 05/03/2025     No results found for: \"FLUAH3\"  No results found for: \"FLUAH1\"  Lab Results   Component Value Date    RSV Not Detected 05/03/2025     Lab Results   Component Value Date    BPARAPCR Not Detected 05/03/2025       COVID 19:  Lab Results   Component Value Date    COVID19 Not Detected 05/03/2025           ABG:   Recent Labs     05/03/25  1613 05/12/25  0558 05/15/25  0651   PHART 7.350 7.410 7.424   INT4RDE 85.4* 74.5* 62.2*   PO2ART 74.2* 57.6* 164.0*   HCI1OVW 47.1* 47.2* 40.7*   BASEEXCESS 18.4* 19.7* 14.0*       Lab Results   Component Value Date    LACTATE 2.2 01/03/2025    LACTATE 0.6 01/02/2025    LACTATE 1.2 07/22/2024         Echo: Results for orders placed during the hospital encounter of 05/03/25    Adult Transthoracic Echo Complete W/ Cont if Necessary Per Protocol    Interpretation Summary    Left ventricular systolic function is normal. Calculated left ventricular EF = 57.3%    Left ventricular wall thickness is consistent with mild septal asymmetric hypertrophy.    Left ventricular diastolic function was indeterminate.    Moderately reduced right ventricular systolic function noted.    Left atrial volume is mildly increased.    Mild aortic valve stenosis is present.    Estimated right ventricular systolic pressure from tricuspid regurgitation is normal (<35 mmHg).    Saline test results are negative for intracardiac shunting.      Results for orders placed during the hospital encounter of 07/22/24    Adult Transthoracic Echo Limited W/ Cont if Necessary Per " Protocol    Interpretation Summary    Left ventricular systolic function is mildly to moderately decreased. Left ventricular ejection fraction appears to be 36 - 40%.    Regional wall motion abnormalities as below.    No left ventricular thrombus identified by contrasted study.           CXRay: Latest imaging study was reviewed personally.   Imaging Results (Last 24 Hours)       ** No results found for the last 24 hours. **              Assessment & Recommendations/Plan:   1.  Acute hypoxic and chronic hypercapnic respiratory Failure  Overall improved with AVAPS and she was able to tolerate AVAPS overnight with current settings.    Patient will benefit from AVAPS as BiPAP has been inadequate to treat her condition.  Patient requires volume assured breast with alarm monitoring and battery backup that is not available on BiPAP.  Without the use of AVAPS, patient will be at high risk for worsening of her underlying condition and recurrent hospitalizations.     Discussed with case management and AVAPS will need to be arranged prior to hospital discharge.    AVAPS Settings:     TV: 400cc  IPAP Max: 80whH23  IPAP Min: 15cm H20  EPAP Max: 8 cm H20  EPAP Min: 5cm H20  Backup Rate: 10       2.  Acute exacerbation of COPD  COPD is severe with last FEV1 of 31% predicted (PFTs in 2022)  Continues on Pulmicort, Brovana and Yupelri  On Solu-Medrol 40 mg every 12 hours and will transition to oral prednisone    3.  Anemia  Stable and will continue to monitor  Lab Results   Component Value Date    HGB 10.5 (L) 05/14/2025    HGB 10.1 (L) 05/13/2025    HGB 9.1 (L) 05/12/2025     Lab Results   Component Value Date     05/14/2025     05/13/2025     05/12/2025     Lab Results   Component Value Date    INR 1.13 (H) 05/12/2025    INR 0.9 01/03/2025    INR 1.07 11/16/2021     We have updated the admitting attending and nursing staff, as appropriate, on the patient's current status and plan. I will be going off shift  tonight and will be unavailable.      This document was electronically signed by Juliet Quiroz MD on 05/15/25 at 08:58 EDT      Dictated utilizing Dragon dictation.

## 2025-05-16 ENCOUNTER — READMISSION MANAGEMENT (OUTPATIENT)
Dept: CALL CENTER | Facility: HOSPITAL | Age: 80
End: 2025-05-16
Payer: MEDICARE

## 2025-05-16 VITALS
WEIGHT: 136.91 LBS | RESPIRATION RATE: 16 BRPM | BODY MASS INDEX: 22.81 KG/M2 | SYSTOLIC BLOOD PRESSURE: 140 MMHG | TEMPERATURE: 97.9 F | HEART RATE: 67 BPM | DIASTOLIC BLOOD PRESSURE: 61 MMHG | HEIGHT: 65 IN | OXYGEN SATURATION: 98 %

## 2025-05-16 LAB
ANION GAP SERPL CALCULATED.3IONS-SCNC: 6.2 MMOL/L (ref 5–15)
BASOPHILS # BLD AUTO: 0.01 10*3/MM3 (ref 0–0.2)
BASOPHILS NFR BLD AUTO: 0.1 % (ref 0–1.5)
BUN SERPL-MCNC: 29 MG/DL (ref 8–23)
BUN/CREAT SERPL: 43.3 (ref 7–25)
CALCIUM SPEC-SCNC: 8.8 MG/DL (ref 8.6–10.5)
CHLORIDE SERPL-SCNC: 102 MMOL/L (ref 98–107)
CO2 SERPL-SCNC: 33.8 MMOL/L (ref 22–29)
CREAT SERPL-MCNC: 0.67 MG/DL (ref 0.57–1)
DEPRECATED RDW RBC AUTO: 51.9 FL (ref 37–54)
EGFRCR SERPLBLD CKD-EPI 2021: 89.6 ML/MIN/1.73
EOSINOPHIL # BLD AUTO: 0 10*3/MM3 (ref 0–0.4)
EOSINOPHIL NFR BLD AUTO: 0 % (ref 0.3–6.2)
ERYTHROCYTE [DISTWIDTH] IN BLOOD BY AUTOMATED COUNT: 16.1 % (ref 12.3–15.4)
GLUCOSE SERPL-MCNC: 122 MG/DL (ref 65–99)
HCT VFR BLD AUTO: 34.7 % (ref 34–46.6)
HGB BLD-MCNC: 10.3 G/DL (ref 12–15.9)
IMM GRANULOCYTES # BLD AUTO: 0.11 10*3/MM3 (ref 0–0.05)
IMM GRANULOCYTES NFR BLD AUTO: 0.7 % (ref 0–0.5)
LYMPHOCYTES # BLD AUTO: 0.75 10*3/MM3 (ref 0.7–3.1)
LYMPHOCYTES NFR BLD AUTO: 4.6 % (ref 19.6–45.3)
MCH RBC QN AUTO: 26.5 PG (ref 26.6–33)
MCHC RBC AUTO-ENTMCNC: 29.7 G/DL (ref 31.5–35.7)
MCV RBC AUTO: 89.2 FL (ref 79–97)
MONOCYTES # BLD AUTO: 0.56 10*3/MM3 (ref 0.1–0.9)
MONOCYTES NFR BLD AUTO: 3.4 % (ref 5–12)
NEUTROPHILS NFR BLD AUTO: 14.95 10*3/MM3 (ref 1.7–7)
NEUTROPHILS NFR BLD AUTO: 91.2 % (ref 42.7–76)
NRBC BLD AUTO-RTO: 0 /100 WBC (ref 0–0.2)
PLATELET # BLD AUTO: 185 10*3/MM3 (ref 140–450)
PMV BLD AUTO: 11.3 FL (ref 6–12)
POTASSIUM SERPL-SCNC: 4 MMOL/L (ref 3.5–5.2)
RBC # BLD AUTO: 3.89 10*6/MM3 (ref 3.77–5.28)
RBC MORPH BLD: NORMAL
SMALL PLATELETS BLD QL SMEAR: ADEQUATE
SODIUM SERPL-SCNC: 142 MMOL/L (ref 136–145)
WBC MORPH BLD: NORMAL
WBC NRBC COR # BLD AUTO: 16.38 10*3/MM3 (ref 3.4–10.8)

## 2025-05-16 PROCEDURE — 94761 N-INVAS EAR/PLS OXIMETRY MLT: CPT

## 2025-05-16 PROCEDURE — 85007 BL SMEAR W/DIFF WBC COUNT: CPT | Performed by: NURSE PRACTITIONER

## 2025-05-16 PROCEDURE — 99239 HOSP IP/OBS DSCHRG MGMT >30: CPT | Performed by: NURSE PRACTITIONER

## 2025-05-16 PROCEDURE — 63710000001 PREDNISONE PER 1 MG: Performed by: INTERNAL MEDICINE

## 2025-05-16 PROCEDURE — 63710000001 REVEFENACIN 175 MCG/3ML SOLUTION: Performed by: INTERNAL MEDICINE

## 2025-05-16 PROCEDURE — 97116 GAIT TRAINING THERAPY: CPT

## 2025-05-16 PROCEDURE — 85025 COMPLETE CBC W/AUTO DIFF WBC: CPT | Performed by: NURSE PRACTITIONER

## 2025-05-16 PROCEDURE — 94799 UNLISTED PULMONARY SVC/PX: CPT

## 2025-05-16 PROCEDURE — 80048 BASIC METABOLIC PNL TOTAL CA: CPT | Performed by: NURSE PRACTITIONER

## 2025-05-16 PROCEDURE — 97530 THERAPEUTIC ACTIVITIES: CPT

## 2025-05-16 RX ORDER — LOSARTAN POTASSIUM 25 MG/1
25 TABLET ORAL DAILY
Qty: 30 TABLET | Refills: 0 | Status: SHIPPED | OUTPATIENT
Start: 2025-05-16

## 2025-05-16 RX ORDER — PREDNISONE 20 MG/1
40 TABLET ORAL
Qty: 10 TABLET | Refills: 0 | Status: SHIPPED | OUTPATIENT
Start: 2025-05-17 | End: 2025-05-22

## 2025-05-16 RX ADMIN — PREDNISONE 40 MG: 20 TABLET ORAL at 10:04

## 2025-05-16 RX ADMIN — ATORVASTATIN CALCIUM 40 MG: 40 TABLET, FILM COATED ORAL at 10:05

## 2025-05-16 RX ADMIN — BUDESONIDE 1 MG: 0.5 SUSPENSION RESPIRATORY (INHALATION) at 07:11

## 2025-05-16 RX ADMIN — Medication 10 ML: at 10:07

## 2025-05-16 RX ADMIN — REVEFENACIN 175 MCG: 175 SOLUTION RESPIRATORY (INHALATION) at 07:12

## 2025-05-16 RX ADMIN — DIAZEPAM 5 MG: 5 TABLET ORAL at 11:39

## 2025-05-16 RX ADMIN — ARFORMOTEROL TARTRATE 15 MCG: 15 SOLUTION RESPIRATORY (INHALATION) at 07:11

## 2025-05-16 RX ADMIN — GUAIFENESIN 600 MG: 600 TABLET, EXTENDED RELEASE ORAL at 10:04

## 2025-05-16 RX ADMIN — PANTOPRAZOLE SODIUM 40 MG: 40 TABLET, DELAYED RELEASE ORAL at 10:04

## 2025-05-16 RX ADMIN — Medication 1 TABLET: at 10:05

## 2025-05-16 RX ADMIN — OXYCODONE HYDROCHLORIDE AND ACETAMINOPHEN 1 TABLET: 7.5; 325 TABLET ORAL at 11:39

## 2025-05-16 RX ADMIN — DULOXETINE 60 MG: 30 CAPSULE, DELAYED RELEASE ORAL at 10:05

## 2025-05-16 RX ADMIN — AMIODARONE HYDROCHLORIDE 200 MG: 200 TABLET ORAL at 10:05

## 2025-05-16 RX ADMIN — APIXABAN 5 MG: 5 TABLET, FILM COATED ORAL at 10:05

## 2025-05-16 RX ADMIN — METOPROLOL SUCCINATE 50 MG: 50 TABLET, EXTENDED RELEASE ORAL at 10:05

## 2025-05-16 NOTE — PLAN OF CARE
Goal Outcome Evaluation:  Plan of Care Reviewed With: patient        Progress: no change  Outcome Evaluation: Patient participated well in PT treatment and demonstrated improving strength and balance.  She was able to perform bed mobility with CGA.  Patient required minimal assistance to perform transfers and gait x 12 feet with HHA and gait belt.  She is settled in the chair with the exit alarm in place.  She declined further work with therapy at this time.  She is expected to benefit from additional PT per POC.

## 2025-05-16 NOTE — CASE MANAGEMENT/SOCIAL WORK
Case Management/Social Work    Patient Name:  Gabi Dudley  YOB: 1945  MRN: [id number]  Admit Date:  5/12/2025        13:30 EDT   Discharge Plan       Row Name 05/16/25 1616       Plan    Plan DCP; Home with daughter today.    Plan Comments Met with pt and daughter at bedside. She is being dscharged today. She is ready to go home! IMM delivered at the time of this conversation. NIPPV will be delivered to pts home after discharge by Wm.                        Electronically signed by:  Janiya Sotelo RN  05/16/25 14:27 EDT

## 2025-05-16 NOTE — PLAN OF CARE
Goal Outcome Evaluation:  Plan of Care Reviewed With: patient        Progress: no change  Outcome Evaluation: Pt participated in OT tx. She reports generalized pain all over and is hesistant to participate in therapy. She moved to eob with CGA and deferred ADL interventions. Pt transferred with min A and HHA and ambulated 12ft to the [facility] with min A and HHA. She was positioned for safety in the chair with the chair alarm activated and all needs in reach. She deferred additional interventions reporting nausea. Her O2 was maintained >90% on 3L throughout the session. Cont current POC.    Anticipated Discharge Disposition (OT): home with assist, home with home health

## 2025-05-16 NOTE — CASE MANAGEMENT/SOCIAL WORK
Case Management Discharge Note      Final Note: Pt discharged home with family via private car. New order for [facility] will follow. NIPPV will be delivered to her home by W[facility]. Daughter has already been contacted by [facility]. Daughter told me she was intructed to call them once they are home and it will be delivered. No other needs at discharge.    Provided Post Acute Provider List?: N/A  Provided Post Acute Provider Quality & Resource List?: N/A    Selected Continued Care - Discharged on 5/16/2025 Admission date: 5/12/2025 - Discharge disposition: Home-Health Care Svc      Destination    No services have been selected for the patient.                Durable Medical Equipment Coordination complete.      Service Provider Services Address Phone Fax Patient Preferred    [facility] [address] [place] [address] DR ZAMORA [address] [place] [address] [phone] --              Dialysis/Infusion    No services have been selected for the patient.                Home Medical Care Coordination complete.      Service Provider Services Address Phone Fax Patient Preferred    [facility] [address], [address], [place] [id number] [phone] --              Therapy    No services have been selected for the patient.                Community Resources    No services have been selected for the patient.                Community & DME    No services have been selected for the patient.                    Selected Continued Care - Episodes Includes continued care and service providers with selected services from the active episodes listed below          Selected Continued Care - Prior Encounters Includes continued care and service providers with selected services from prior encounters from 2/11/2025 to 5/16/2025      Discharged on 5/6/2025 Admission date: 5/3/2025 - Discharge disposition: Home-Health Care [facility]       Home Medical Care       Service Provider Services Address Phone Fax Patient Preferred    [facility] [address] [facility], [address], [place] [id number] [phone] --                          Transportation Services  Private: Car    Final Discharge Disposition Code: 06 - home with home health care

## 2025-05-16 NOTE — DISCHARGE INSTRUCTIONS
Patient to be discharged home with home health.  AVAPS at night and on 2 hours off 1 hour during the day.  Continue prednisone as prescribed.  Follow with PCP/COPD clinic as scheduled.  Stop trazodone.  Losartan added for blood pressure control.  Return to emergency department for any worsening symptoms.

## 2025-05-16 NOTE — THERAPY TREATMENT NOTE
Patient Name: Gabi Dudley  : 1945    MRN: [id number]                              Today's Date: 2025       Admit Date: 2025    Visit Dx:     ICD-10-CM ICD-9-CM   1. Acute encephalopathy  G93.40 348.30   2. Chronic respiratory failure  J96.10 518.83   3. Chronic obstructive pulmonary disease (COPD)  J44.9 496   4. COPD exacerbation  J44.1 491.21     Patient Active Problem List   Diagnosis    Adrenal adenoma    Anxiety    Chronic fatigue    Fibromyalgia    Hyperlipidemia    Essential hypertension    Insomnia    Osteoarthritis of knee    Osteoporosis    Pulmonary emphysema    Simple renal cyst    Tension headache    Vitamin D deficiency    Diverticulosis    Inversion of nipple    Paroxysmal atrial fibrillation    Coronary artery disease involving native coronary artery of native heart without angina pectoris    Autonomic dysfunction    Gastroesophageal reflux disease without esophagitis    Urge incontinence    Erythrasma    Compression fracture of T5 vertebra    Lung nodule    COPD (chronic obstructive pulmonary disease)    Overweight (BMI 25.0-29.9)    Acute on chronic respiratory failure with hypoxia    Acute on chronic respiratory failure with hypoxia and hypercapnia    Iron deficiency anemia    Impaired mobility and ADLs    Acute respiratory failure with hypoxia and hypercapnia    Aspiration pneumonia of right lower lobe    Partial small bowel obstruction    Moderate malnutrition    Bowel obstruction    Enteritis    Diverticulitis    Elevated troponin    Chronic combined systolic and diastolic CHF (congestive heart failure)    NSTEMI (non-ST elevated myocardial infarction)    COPD exacerbation    Altered mental status    Altered mental status, unspecified     Past Medical History:   Diagnosis Date    Adrenal adenoma     Anemia     Arrhythmia     Asthma     Atrial fibrillation     Back pain     Benign colonic polyp     Benign tumor of adrenal gland     Cataract     bilateral    Cholelithiasis      Chronic bronchitis     Chronic bronchitis with COPD (chronic obstructive pulmonary disease)     COPD (chronic obstructive pulmonary disease) 2005    Coronary artery disease     Depression     Diverticulosis Years ago    Elevated cholesterol     Environmental and seasonal allergies     Fibromyalgia     Fibromyalgia, primary Sometime in the 90s    Gastritis     Generalized anxiety disorder     GERD (gastroesophageal reflux disease)     H/O mammogram     Headache Years ago    Hemorrhoids     History of blood transfusion 1985    History of blood transfusion 1985    History of echocardiogram     History of endometriosis     History of nuclear stress test     Hospitalization or health care facility admission within last 6 months     5 times between 11/2022-05/2023    Hypertension     IBS (irritable bowel syndrome)     Impaired functional mobility, balance, gait, and endurance     Impaired mobility     Inverted nipple     Kidney disease     Kidney stone     Liver cyst     Low back pain Years ago    Nodular radiologic density     Nodule of left lung     NSTEMI (non-ST elevated myocardial infarction) 9/24/2024    On home oxygen therapy     2 liters NC QHS    Osteoarthritis     Osteopenia Several years ago    Osteoporosis     PONV (postoperative nausea and vomiting)     Renal cyst     Sinus problem     2014    Sinusitis     Skin cancer     basal cell carcinoma    SOB (shortness of breath)     Tobacco use     Urinary frequency     Urinary tract infection Years ago    Frequent UTIs    Vitamin D deficiency     Wears glasses     Wears partial dentures     upper plate     Past Surgical History:   Procedure Laterality Date    APPENDECTOMY  1980s    CARDIAC CATHETERIZATION  2003    CATARACT EXTRACTION  2013    both eyes    CHOLECYSTECTOMY  2020    CHOLECYSTECTOMY WITH INTRAOPERATIVE CHOLANGIOGRAM N/A 10/30/2020    Procedure: CHOLECYSTECTOMY LAPAROSCOPIC INTRAOPERATIVE CHOLANGIOGRAPHY;  Surgeon: Sima Moses MD;  Location:   [facility];  Service: General;  Laterality: N/A;    COLON SURGERY  2020    COLONOSCOPY  03/11/2013    COLONOSCOPY  06/21/2016    COLONOSCOPY N/A 07/24/2019    Procedure: COLONOSCOPY W/ COLD FORCEP POLYPECTOMIES; HOT SNARE POLYPECTOMIES; COLD SNARE POLYPECTOMY;  Surgeon: Goyo Nunez MD;  Location: [facility];  Service: Gastroenterology    COLONOSCOPY W/ BIOPSIES AND POLYPECTOMY      EXPLORATORY LAPAROTOMY N/A 11/23/2020    Procedure: colectomy, right, closure of enterotomy x 2, reduction of internal volvulus;  Surgeon: Sima Moses MD;  Location: [facility] OR;  Service: General;  Laterality: N/A;    EXPLORATORY LAPAROTOMY N/A 8/9/2023    Procedure: LAPAROTOMY EXPLORATORY WITH LYIS OF ADHESIONS AND  CENTRAL LINE INSERTION;  Surgeon: Bianca Isaac DO;  Location: [facility] OR;  Service: General;  Laterality: N/A;    HYSTERECTOMY  1980s    partial    LYSIS OF ABDOMINAL ADHESIONS      TONSILLECTOMY  1987    UPPER GASTROINTESTINAL ENDOSCOPY  01/13/2015    VAGINAL DELIVERY      x2      General Information       Row Name 05/16/25 1158          Physical Therapy Time and Intention    Document Type therapy note (daily note)  -     Mode of Treatment physical therapy  -       Row Name 05/16/25 1158          General Information    Patient Profile Reviewed yes  -               User Key  (r) = Recorded By, (t) = Taken By, (c) = Cosigned By      Initials Name Provider Type    JR Cait Tubbs, PT Physical Therapist                   Mobility       Row Name 05/16/25 1158          Bed Mobility    Bed Mobility supine-sit  -     Supine-Sit [place] (Bed Mobility) contact guard  -       Row Name 05/16/25 1158          Bed-Chair Transfer    Bed-Chair [place] (Transfers) minimum assist (75% patient effort)  -     Assistive Device (Bed-Chair Transfers) walker, front-wheeled  -       Row Name 05/16/25 1158          Sit-Stand Transfer    Sit-Stand [place] (Transfers) minimum assist (75% patient effort)  -      Assistive Device (Sit-Stand Transfers) walker, 4-wheeled  -JR       Row Name 05/16/25 1158          Gait/Stairs (Locomotion)    [place] Level (Gait) minimum assist (75% patient effort)  -JR     Assistive Device (Gait) other (see comments)  HHA and gait belt  -JR     Patient was able to Ambulate yes  -JR     Distance in Feet (Gait) 12  -JR     Deviations/Abnormal Patterns (Gait) festinating/shuffling;stride length decreased;gait speed decreased;base of support, narrow  -JR     Bilateral Gait Deviations forward flexed posture;heel strike decreased  -JR               User Key  (r) = Recorded By, (t) = Taken By, (c) = Cosigned By      Initials Name Provider Type    Cait Ruvalcaba PT Physical Therapist                   Obj/Interventions       Row Name 05/16/25 1158          Balance    Balance Assessment sitting static balance;sitting dynamic balance;standing static balance;standing dynamic balance  -JR     Static Sitting Balance standby assist  -JR     Dynamic Sitting Balance standby assist  -JR     Position, Sitting Balance unsupported;sitting edge of bed  -JR     Static Standing Balance contact guard  -JR     Dynamic Standing Balance minimal assist  -JR     Position/Device Used, Standing Balance supported;walker, front-wheeled  -JR               User Key  (r) = Recorded By, (t) = Taken By, (c) = Cosigned By      Initials Name Provider Type    Cait Ruvalcaba, PT Physical Therapist                   Goals/Plan    No documentation.                  Clinical Impression       Row Name 05/16/25 1158          Pain    Pretreatment Pain Rating 0/10 - no pain  -JR     Posttreatment Pain Rating 0/10 - no pain  -JR       Row Name 05/16/25 1158          Plan of Care Review    Plan of Care Reviewed With patient  -JR     Progress no change  -JR     Outcome Evaluation Patient participated well in PT treatment and demonstrated improving strength and balance.  She was able to perform bed mobility with CGA.  Patient required  minimal assistance to perform transfers and gait x 12 feet with HHA and gait belt.  She is settled in the chair with the exit alarm in place.  She declined further work with therapy at this time.  She is expected to benefit from additional PT per POC.  -JR       Row Name 05/16/25 1158          Positioning and Restraints    Pre-Treatment Position in bed  -JR     Post Treatment Position chair  -JR     In Chair reclined;call light within reach;encouraged to call for assist;exit alarm on;notified nsg  -               User Key  (r) = Recorded By, (t) = Taken By, (c) = Cosigned By      Initials Name Provider Type    Cait Ruvalcaba, PT Physical Therapist                   Outcome Measures       Row Name 05/16/25 1158 05/16/25 0800       How much help from another person do you currently need...    Turning from your back to your side while in flat bed without using bedrails? 4  -JR 4  -MH    Moving from lying on back to sitting on the side of a flat bed without bedrails? 4  -JR 4  -MH    Moving to and from a bed to a chair (including a wheelchair)? 3  -JR 3  -MH    Standing up from a chair using your arms (e.g., wheelchair, bedside chair)? 3  -JR 3  -MH    Climbing 3-5 steps with a railing? 2  -JR 2  -MH    To walk in hospital room? 3  -JR 3  -MH    AM-PAC 6 Clicks Score (PT) 19  -JR 19  -MH    Highest Level of Mobility Goal Walk 10 Steps or More-6  -JR Walk 10 Steps or More-6  -MH      Row Name 05/16/25 0600 05/16/25 0400       How much help from another person do you currently need...    Turning from your back to your side while in flat bed without using bedrails? 4  -KP 4  -KP    Moving from lying on back to sitting on the side of a flat bed without bedrails? 4  -KP 4  -KP    Moving to and from a bed to a chair (including a wheelchair)? 3  -KP 3  -KP    Standing up from a chair using your arms (e.g., wheelchair, bedside chair)? 3  -KP 3  -KP    Climbing 3-5 steps with a railing? 2  -KP 2  -KP    To walk in hospital  room? 3  -KP 3  -KP    AM-PAC 6 Clicks Score (PT) 19  -KP 19  -KP    Highest Level of Mobility Goal Walk 10 Steps or More-6  -KP Walk 10 Steps or More-6  -KP      Row Name 05/16/25 0200          How much help from another person do you currently need...    Turning from your back to your side while in flat bed without using bedrails? 4  -KP     Moving from lying on back to sitting on the side of a flat bed without bedrails? 4  -KP     Moving to and from a bed to a chair (including a wheelchair)? 3  -KP     Standing up from a chair using your arms (e.g., wheelchair, bedside chair)? 3  -KP     Climbing 3-5 steps with a railing? 2  -KP     To walk in hospital room? 3  -KP     AM-PAC 6 Clicks Score (PT) 19  -KP     Highest Level of Mobility Goal Walk 10 Steps or More-6  -KP       Row Name 05/16/25 1158          Functional Assessment    Outcome Measure Options AM-PAC 6 Clicks Basic Mobility (PT)  -               User Key  (r) = Recorded By, (t) = Taken By, (c) = Cosigned By      Initials Name Provider Type    Cait Ruvalcaba, PT Physical Therapist     Susan Aguilar, RN Registered Nurse     Yamilet Overton, RN Registered Nurse                                 Physical Therapy Education       Title: PT OT SLP Therapies (Resolved)       Topic: Physical Therapy (Resolved)       Point: Mobility training (Resolved)       Learning Progress Summary            Patient Acceptance, E, VU by MS at 5/12/2025 1552    Comment: importance of mobility                      Point: Home exercise program (Resolved)       Learning Progress Summary            Patient Acceptance, E, VU by MS at 5/12/2025 1552    Comment: importance of mobility                      Point: Body mechanics (Resolved)       Learner Progress:  Not documented in this visit.              Point: Precautions (Resolved)       Learner Progress:  Not documented in this visit.                              User Key       Initials Effective Dates Name Provider Type  Discipline    MS 08/22/23 -  Farzad Crowder PT Physical Therapist PT                  PT Recommendation and Plan     Progress: no change  Outcome Evaluation: Patient participated well in PT treatment and demonstrated improving strength and balance.  She was able to perform bed mobility with CGA.  Patient required minimal assistance to perform transfers and gait x 12 feet with HHA and gait belt.  She is settled in the chair with the exit alarm in place.  She declined further work with therapy at this time.  She is expected to benefit from additional PT per POC.     Time Calculation:         PT Charges       Row Name 05/16/25 1158             Time Calculation    Start Time 1158  -JR      Stop Time 1208  -JR      Time Calculation (min) 10 min  -JR      PT Received On 05/16/25  -JR      PT Goal Re-Cert Due Date 05/22/25  -JR         Time Calculation- PT    Total Timed Code Minutes- PT 10 minute(s)  -JR         Timed Charges    65948 - Gait Training Minutes  10  -JR         Total Minutes    Timed Charges Total Minutes 10  -JR       Total Minutes 10  -JR                User Key  (r) = Recorded By, (t) = Taken By, (c) = Cosigned By      Initials Name Provider Type    Cait Ruvalcaba PT Physical Therapist                  Therapy Charges for Today       Code Description Service Date Service Provider Modifiers Qty    [id number] HC GAIT TRAINING EA 15 MIN 5/16/2025 Cait Tubbs, PT GP 1            PT G-Codes  Outcome Measure Options: AM-PAC 6 Clicks Basic Mobility (PT)  AM-PAC 6 Clicks Score (PT): 19  AM-PAC 6 Clicks Score (OT): 18       Cait Tubbs PT  5/16/2025

## 2025-05-16 NOTE — PLAN OF CARE
Goal Outcome Evaluation:   VSS during shift. Pt with c/o anxiety during shift. Medication given per order, see MAR. No s/s of acute distress noted at this time. POC ongoing.

## 2025-05-16 NOTE — THERAPY TREATMENT NOTE
Patient Name: Gabi Dudley  : 1945    MRN: [id number]                              Today's Date: 2025       Admit Date: 2025    Visit Dx:     ICD-10-CM ICD-9-CM   1. Acute encephalopathy  G93.40 348.30   2. Chronic respiratory failure  J96.10 518.83   3. Chronic obstructive pulmonary disease (COPD)  J44.9 496   4. COPD exacerbation  J44.1 491.21     Patient Active Problem List   Diagnosis    Adrenal adenoma    Anxiety    Chronic fatigue    Fibromyalgia    Hyperlipidemia    Essential hypertension    Insomnia    Osteoarthritis of knee    Osteoporosis    Pulmonary emphysema    Simple renal cyst    Tension headache    Vitamin D deficiency    Diverticulosis    Inversion of nipple    Paroxysmal atrial fibrillation    Coronary artery disease involving native coronary artery of native heart without angina pectoris    Autonomic dysfunction    Gastroesophageal reflux disease without esophagitis    Urge incontinence    Erythrasma    Compression fracture of T5 vertebra    Lung nodule    COPD (chronic obstructive pulmonary disease)    Overweight (BMI 25.0-29.9)    Acute on chronic respiratory failure with hypoxia    Acute on chronic respiratory failure with hypoxia and hypercapnia    Iron deficiency anemia    Impaired mobility and ADLs    Acute respiratory failure with hypoxia and hypercapnia    Aspiration pneumonia of right lower lobe    Partial small bowel obstruction    Moderate malnutrition    Bowel obstruction    Enteritis    Diverticulitis    Elevated troponin    Chronic combined systolic and diastolic CHF (congestive heart failure)    NSTEMI (non-ST elevated myocardial infarction)    COPD exacerbation    Altered mental status    Altered mental status, unspecified     Past Medical History:   Diagnosis Date    Adrenal adenoma     Anemia     Arrhythmia     Asthma     Atrial fibrillation     Back pain     Benign colonic polyp     Benign tumor of adrenal gland     Cataract     bilateral    Cholelithiasis      Chronic bronchitis     Chronic bronchitis with COPD (chronic obstructive pulmonary disease)     COPD (chronic obstructive pulmonary disease) 2005    Coronary artery disease     Depression     Diverticulosis Years ago    Elevated cholesterol     Environmental and seasonal allergies     Fibromyalgia     Fibromyalgia, primary Sometime in the 90s    Gastritis     Generalized anxiety disorder     GERD (gastroesophageal reflux disease)     H/O mammogram     Headache Years ago    Hemorrhoids     History of blood transfusion 1985    History of blood transfusion 1985    History of echocardiogram     History of endometriosis     History of nuclear stress test     Hospitalization or health care facility admission within last 6 months     5 times between 11/2022-05/2023    Hypertension     IBS (irritable bowel syndrome)     Impaired functional mobility, balance, gait, and endurance     Impaired mobility     Inverted nipple     Kidney disease     Kidney stone     Liver cyst     Low back pain Years ago    Nodular radiologic density     Nodule of left lung     NSTEMI (non-ST elevated myocardial infarction) 9/24/2024    On home oxygen therapy     2 liters NC QHS    Osteoarthritis     Osteopenia Several years ago    Osteoporosis     PONV (postoperative nausea and vomiting)     Renal cyst     Sinus problem     2014    Sinusitis     Skin cancer     basal cell carcinoma    SOB (shortness of breath)     Tobacco use     Urinary frequency     Urinary tract infection Years ago    Frequent UTIs    Vitamin D deficiency     Wears glasses     Wears partial dentures     upper plate     Past Surgical History:   Procedure Laterality Date    APPENDECTOMY  1980s    CARDIAC CATHETERIZATION  2003    CATARACT EXTRACTION  2013    both eyes    CHOLECYSTECTOMY  2020    CHOLECYSTECTOMY WITH INTRAOPERATIVE CHOLANGIOGRAM N/A 10/30/2020    Procedure: CHOLECYSTECTOMY LAPAROSCOPIC INTRAOPERATIVE CHOLANGIOGRAPHY;  Surgeon: Sima Moses MD;  Location:   [facility];  Service: General;  Laterality: N/A;    COLON SURGERY  2020    COLONOSCOPY  03/11/2013    COLONOSCOPY  06/21/2016    COLONOSCOPY N/A 07/24/2019    Procedure: COLONOSCOPY W/ COLD FORCEP POLYPECTOMIES; HOT SNARE POLYPECTOMIES; COLD SNARE POLYPECTOMY;  Surgeon: Goyo Nunez MD;  Location: [facility] ENDOSCOPY;  Service: Gastroenterology    COLONOSCOPY W/ BIOPSIES AND POLYPECTOMY      EXPLORATORY LAPAROTOMY N/A 11/23/2020    Procedure: colectomy, right, closure of enterotomy x 2, reduction of internal volvulus;  Surgeon: Sima Moses MD;  Location: [facility] OR;  Service: General;  Laterality: N/A;    EXPLORATORY LAPAROTOMY N/A 8/9/2023    Procedure: LAPAROTOMY EXPLORATORY WITH LYIS OF ADHESIONS AND  CENTRAL LINE INSERTION;  Surgeon: Bianca Isaac DO;  Location: [facility] OR;  Service: General;  Laterality: N/A;    HYSTERECTOMY  1980s    partial    LYSIS OF ABDOMINAL ADHESIONS      TONSILLECTOMY  1987    UPPER GASTROINTESTINAL ENDOSCOPY  01/13/2015    VAGINAL DELIVERY      x2      General Information       Row Name 05/16/25 1345          OT Time and Intention    Subjective Information complains of;weakness;fatigue;pain  -SP     Document Type therapy note (daily note)  -SP     Mode of Treatment occupational therapy  -SP     Symptoms Noted During/After Treatment fatigue;shortness of breath  -SP       Row Name 05/16/25 4382          General Information    Patient Profile Reviewed yes  -SP     Existing Precautions/Restrictions fall;oxygen therapy device and L/min  -SP       Row Name 05/16/25 1348          Safety Issues/Impairments Affecting Functional Mobility    Safety Issues Affecting Function (Mobility) safety precaution awareness;safety precautions follow-through/compliance;insight into deficits/self-awareness  -SP     Impairments Affecting Function (Mobility) balance;strength;shortness of breath;pain;endurance/activity tolerance  -SP               User Key  (r) = Recorded By, (t) = Taken By, (c) = Cosigned By       Initials Name Provider Type    SP Zandra Cox OT Occupational Therapist                     Mobility/ADL's       Row Name 05/16/25 1346          Bed Mobility    Bed Mobility supine-sit  -SP     Supine-Sit [place] (Bed Mobility) contact guard  -SP       Row Name 05/16/25 1346          Transfers    Transfers sit-stand transfer  -SP       Row Name 05/16/25 1346          Sit-Stand Transfer    Sit-Stand [place] (Transfers) minimum assist (75% patient effort)  -SP     Assistive Device (Sit-Stand Transfers) other (see comments)  HHA  -SP       Row Name 05/16/25 1346          Functional Mobility    Functional Mobility- Ind. Level minimum assist (75% patient effort);verbal cues required  -SP     Functional Mobility- Device other (see comments)  -SP     Functional Mobility-Distance (Feet) 12  -SP     Functional Mobility- Safety Issues supplemental O2  -SP     Patient was able to Ambulate yes  -SP               User Key  (r) = Recorded By, (t) = Taken By, (c) = Cosigned By      Initials Name Provider Type    SP Zandra Cox OT Occupational Therapist                   Obj/Interventions    No documentation.                  Goals/Plan    No documentation.                  Clinical Impression       Row Name 05/16/25 1347          Pain Assessment    Pain Side/Orientation generalized  -SP     Pre/Posttreatment Pain Comment pt did not provide pain value, reports generalized pain all over  -SP       Row Name 05/16/25 1347          Plan of Care Review    Plan of Care Reviewed With patient  -SP     Progress no change  -SP     Outcome Evaluation Pt participated in OT tx. She reports generalized pain all over and is hesistant to participate in therapy. She moved to eob with CGA and deferred ADL interventions. Pt transferred with min A and HHA and ambulated 12ft to the [facility] with min A and HHA. She was positioned for safety in the chair with the chair alarm activated and all needs in reach. She deferred additional  interventions reporting nausea. Her O2 was maintained >90% on 3L throughout the session. Cont current POC.  -SP       Row Name 05/16/25 1347          Vital Signs    Pre SpO2 (%) 99  -SP     O2 Delivery Pre Treatment supplemental O2  3L  -SP     Intra SpO2 (%) 92  -SP     O2 Delivery Intra Treatment supplemental O2  -SP     Post SpO2 (%) 97  -SP     O2 Delivery Post Treatment supplemental O2  -SP     Pre Patient Position Supine  -SP     Intra Patient Position Standing  -SP     Post Patient Position Sitting  -SP       Row Name 05/16/25 1347          Positioning and Restraints    Pre-Treatment Position in bed  -SP     Post Treatment Position chair  -SP     In Chair reclined;call light within reach;encouraged to call for assist;exit alarm on;notified nsg  -SP               User Key  (r) = Recorded By, (t) = Taken By, (c) = Cosigned By      Initials Name Provider Type    Zandra Butt, OT Occupational Therapist                   Outcome Measures       Row Name 05/16/25 1350          How much help from another is currently needed...    Putting on and taking off regular lower body clothing? 3  -SP     Bathing (including washing, rinsing, and drying) 2  -SP     Toileting (which includes using toilet bed pan or urinal) 3  -SP     Putting on and taking off regular upper body clothing 3  -SP     Taking care of personal grooming (such as brushing teeth) 3  -SP     Eating meals 3  -SP     AM-PAC 6 Clicks Score (OT) 17  -SP       Row Name 05/16/25 1158 05/16/25 0800       How much help from another person do you currently need...    Turning from your back to your side while in flat bed without using bedrails? 4  -JR 4  -MH    Moving from lying on back to sitting on the side of a flat bed without bedrails? 4  -JR 4  -MH    Moving to and from a bed to a chair (including a wheelchair)? 3  -JR 3  -MH    Standing up from a chair using your arms (e.g., wheelchair, bedside chair)? 3  -JR 3  -MH    Climbing 3-5 steps with a railing? 2   -JR 2  -MH    To walk in hospital room? 3  -JR 3  -MH    AM-PAC 6 Clicks Score (PT) 19  -JR 19  -MH    Highest Level of Mobility Goal Walk 10 Steps or More-6  -JR Walk 10 Steps or More-6  -MH      Row Name 05/16/25 0600 05/16/25 0400       How much help from another person do you currently need...    Turning from your back to your side while in flat bed without using bedrails? 4  -KP 4  -KP    Moving from lying on back to sitting on the side of a flat bed without bedrails? 4  -KP 4  -KP    Moving to and from a bed to a chair (including a wheelchair)? 3  -KP 3  -KP    Standing up from a chair using your arms (e.g., wheelchair, bedside chair)? 3  -KP 3  -KP    Climbing 3-5 steps with a railing? 2  -KP 2  -KP    To walk in hospital room? 3  -KP 3  -KP    AM-PAC 6 Clicks Score (PT) 19  -KP 19  -KP    Highest Level of Mobility Goal Walk 10 Steps or More-6  -KP Walk 10 Steps or More-6  -KP      Row Name 05/16/25 0200          How much help from another person do you currently need...    Turning from your back to your side while in flat bed without using bedrails? 4  -KP     Moving from lying on back to sitting on the side of a flat bed without bedrails? 4  -KP     Moving to and from a bed to a chair (including a wheelchair)? 3  -KP     Standing up from a chair using your arms (e.g., wheelchair, bedside chair)? 3  -KP     Climbing 3-5 steps with a railing? 2  -KP     To walk in hospital room? 3  -KP     AM-PAC 6 Clicks Score (PT) 19  -KP     Highest Level of Mobility Goal Walk 10 Steps or More-6  -KP       Row Name 05/16/25 1350 05/16/25 1158       Functional Assessment    Outcome Measure Options AM-PAC 6 Clicks Daily Activity (OT)  -SP AM-PAC 6 Clicks Basic Mobility (PT)  -              User Key  (r) = Recorded By, (t) = Taken By, (c) = Cosigned By      Initials Name Provider Type    Cait Ruvalcaba, PT Physical Therapist    Zandra Butt, OT Occupational Therapist    Susan Linton, RN Registered Nurse    KP  Yamilet Overton, RN Registered Nurse                      OT Recommendation and Plan  Planned Therapy Interventions (OT): activity tolerance training, BADL retraining, functional balance retraining, IADL retraining, occupation/activity based interventions, patient/caregiver education/training, ROM/therapeutic exercise, strengthening exercise, transfer/mobility retraining  Therapy Frequency (OT): 3 times/wk  Plan of Care Review  Plan of Care Reviewed With: patient  Progress: no change  Outcome Evaluation: Pt participated in OT tx. She reports generalized pain all over and is hesistant to participate in therapy. She moved to eob with CGA and deferred ADL interventions. Pt transferred with min A and HHA and ambulated 12ft to the [facility] with min A and HHA. She was positioned for safety in the chair with the chair alarm activated and all needs in reach. She deferred additional interventions reporting nausea. Her O2 was maintained >90% on 3L throughout the session. Cont current POC.     Time Calculation:   Evaluation Complexity (OT)  Review Occupational Profile/Medical/Therapy History Complexity: brief/low complexity  Assessment, Occupational Performance/Identification of Deficit Complexity: 3-5 performance deficits  Clinical Decision Making Complexity (OT): problem focused assessment/low complexity  Overall Complexity of Evaluation (OT): low complexity     Time Calculation- OT       Row Name 05/16/25 1350 05/16/25 1158          Time Calculation- OT    OT Start Time 1157  -SP --     OT Stop Time 1207  -SP --     OT Time Calculation (min) 10 min  -SP --     OT Received On 05/16/25  -SP --     OT Goal Re-Cert Due Date 05/22/25  -SP --        Timed Charges    40109 - Gait Training Minutes  -- 10  -JR     98102 - OT Therapeutic Activity Minutes 10  -SP --        Total Minutes    Timed Charges Total Minutes 10  -SP 10  -JR      Total Minutes 10  -SP 10  -JR               User Key  (r) = Recorded By, (t) = Taken By, (c) =  Cosigned By      Initials Name Provider Type    JR Cait Tubbs, PT Physical Therapist    SP Zandra Cox OT Occupational Therapist                  Therapy Charges for Today       Code Description Service Date Service Provider Modifiers Qty    [id number] HC OT THERAPEUTIC ACT EA 15 MIN 5/16/2025 Zandra Cox OT GO 1                 Zandra Cox OT  5/16/2025

## 2025-05-16 NOTE — DISCHARGE SUMMARY
[facility]      Name:  Gabi Dudley   Age:  80 y.o.  Sex:  female  :  1945  MRN:  [id number]   Visit Number:  [id number]    Admission Date:  2025  Date of Discharge:  2025  Primary Care Physician:  Krystina Saunders, DO    Important issues to note:    -Patient admitted for chronic hypoxic hypercapnic respiratory failure with acute COPD exacerbation.  Initiated on supportive care with IV Solu-Medrol.  Seen by pulmonology who is agreeable patient would benefit from AVAPS.  - Goals of care discussion with patient deciding DNR/DNI.  If worsening or decline would consider hospice services.  - Continue prednisone 40 mg x 5 days with COPD clinic follow-up.  - Home health ordered.  - Losartan ordered for blood pressure control.  -Advised to stop trazodone, limit sedating medications as will worsen hypercapnia.  -Patient has reached maximum medical improvement of inpatient hospital stay.   -Patient is discharged in stable condition with otherwise reassuring labs and vitals.  -Strict return precautions given.  -Follow with PCP as scheduled.     Discharge Diagnoses:     Chronic hypoxic hypercapnic respiratory failure  Acute exacerbation of COPD  Chronic systolic congestive heart failure  Atrial fibrillation on Eliquis  Hypertension  Hyperlipidemia  Tobacco use disorder  Osteoporosis  Osteoarthritis  Fibromyalgia  Anxiety  Depression  Vitamin D deficiency    Problem List:     Active Hospital Problems    Diagnosis  POA    **Altered mental status [R41.82]  Yes    Altered mental status, unspecified [R41.82]  Yes    COPD exacerbation [J44.1]  Yes      Resolved Hospital Problems   No resolved problems to display.     Presenting Problem:    Chief Complaint   Patient presents with    Altered Mental Status      Consults:     Consulting Physician(s)         Provider   Role Specialty     Herbert Poe MD      Consulting Physician Pulmonary Disease           Procedures Performed:        History of presenting illness/Hospital Course:    Ms. Reed is a pleasant 80-year-old female with pertinent past medical history of chronic respiratory failure with COPD on 4 L nasal cannula, hypertension, paroxysmal atrial fibrillation on Eliquis, chronic systolic heart failure, depression and anxiety, anemia who presented to emergency department due to worsening shortness of air/confusion.  Patient noncompliant with BiPAP at home.  Recent admission for COPD exacerbation with A-fib with RVR.     Upon ED presentation patient requiring 4 L nasal cannula which is baseline, otherwise hemodynamically stable.  Pertinent labs and imaging noted procalcitonin 0.07, WBC 5.67, initial troponin 17 with repeat 17, sodium 148, ABG noted compensated pH is 7.4 with pCO2 74.5, chest x-ray with no acute finding, CT of head negative for intracranial findings, CT abdomen pelvis chronic findings with no acute process.  Hospitalist consulted for further medical management. Pulmonology consulted for recommendations with hypercapnia and severe COPD.  AVAPS ordered for which patient tolerated well with overall improvement in ABG noted.  Patient to be discharged home with home health.  If patient continues to decline would consider hospice consult.  Follow with PCP/COPD disease state management clinic.  Strict return precautions given.  Discussed plan of care with patient and daughter at bedside Farida, agreeable with plan of care.    Vital Signs:    Temp:  [95.9 °F (35.5 °C)-99.5 °F (37.5 °C)] 97.9 °F (36.6 °C)  Heart Rate:  [67-70] 67  Resp:  [16-18] 16  BP: (129-152)/(58-71) 140/61    Physical Exam:    General Appearance:  Alert and cooperative.  Chronically ill middle-aged female.   Head:  Atraumatic and normocephalic.   Eyes: Conjunctivae and sclerae normal, no icterus. No pallor.   Ears:  Ears with no abnormalities noted.   Throat: No oral lesions, no thrush, oral mucosa moist.   Neck: Supple, trachea  midline, no thyromegaly.   Back:   No kyphoscoliosis present. No tenderness to palpation.   Lungs:   Breath sounds heard bilaterally equally.  No crackles or wheezing. No Pleural rub or bronchial breathing.  Slightly labored at baseline on 3 L nasal cannula.   Heart:  Normal S1 and S2, no murmur, no gallop, no rub. No JVD.   Abdomen:   Normal bowel sounds, no masses, no organomegaly. Soft, nontender, nondistended, no rebound tenderness.   Extremities: Supple, no edema, no cyanosis, no clubbing.   Pulses: Pulses palpable bilaterally.   Skin: No bleeding or rash.   Neurologic: Alert and oriented x 3. No facial asymmetry. Moves all four limbs. Tremors.  Severe generalized weakness.     Pertinent Lab Results:     Results from last 7 days   Lab Units 05/16/25  0809 05/15/25  2309 05/15/25  1949 05/15/25  0827 05/14/25  0735 05/13/25  0623 05/12/25  0540   SODIUM mmol/L 142  --   --  141 141   < > 148*   POTASSIUM mmol/L 4.0 5.4* 6.2* 3.5 4.0   < > 4.4   CHLORIDE mmol/L 102  --   --  97* 97*   < > 101   CO2 mmol/L 33.8*  --   --  37.2* 37.0*   < > 46.3*   BUN mg/dL 29*  --   --  30* 20   < > 24*   CREATININE mg/dL 0.67  --   --  0.59 0.46*   < > 0.58   CALCIUM mg/dL 8.8  --   --  9.2 9.0   < > 8.6   BILIRUBIN mg/dL  --   --   --   --   --   --  0.2   ALK PHOS U/L  --   --   --   --   --   --  107   ALT (SGPT) U/L  --   --   --   --   --   --  30   AST (SGOT) U/L  --   --   --   --   --   --  20   GLUCOSE mg/dL 122*  --   --  150* 130*   < > 105*    < > = values in this interval not displayed.     Results from last 7 days   Lab Units 05/16/25  0809 05/15/25  0827 05/14/25  0735   WBC 10*3/mm3 16.38* 16.02* 13.69*   HEMOGLOBIN g/dL 10.3* 10.8* 10.5*   HEMATOCRIT % 34.7 36.3 34.8   PLATELETS 10*3/mm3 185 222 155     Results from last 7 days   Lab Units 05/12/25  0540   INR  1.13*     Results from last 7 days   Lab Units 05/12/25  0814 05/12/25  0540   HSTROP T ng/L 17* 17*                 Results from last 7 days   Lab Units  05/15/25  0651   PH, ARTERIAL pH units 7.424   PO2 ART mm Hg 164.0*   PCO2, ARTERIAL mm Hg 62.2*   HCO3 ART mmol/L 40.7*           Pertinent Radiology Results:    Imaging Results (All)       Procedure Component Value Units Date/Time    XR Chest 1 View [[id number]] Collected: 05/12/25 0824     Updated: 05/12/25 0826    Narrative:      PROCEDURE: XR CHEST 1 VW-     HISTORY: weakness, shortness of breath     COMPARISON: 5/3/2025     FINDINGS:  Portable view of the chest demonstrates emphysematous  changes. No acute pulmonary density is seen. There is no evidence of  effusion, pneumothorax or other significant pleural disease. The  mediastinum is unremarkable.     The heart size is normal.       Impression:      No acute findings           This report was signed and finalized on 5/12/2025 8:24 AM by Dillon Veliz MD.       CT Abdomen Pelvis With Contrast [[id number]] Collected: 05/12/25 0753     Updated: 05/12/25 0759    Narrative:      PROCEDURE: CT ABDOMEN PELVIS W CONTRAST-     TECHNIQUE: IV contrast enhanced exam     HISTORY: Abdominal pain, AMS     COMPARISON: 1/2/2025.     FINDINGS:     ABDOMEN: Small hypodense lesions are seen in the liver parenchyma  compatible with benign cyst. Bilateral renal cysts are noted stable.  Right renal cyst is complex with calcified thin septation. Left adrenal  mass is stable compatible with benign adenoma. Remaining solid organs  are normal. No bowel obstruction is seen. Moderate fecal impaction is  noted. Small midline abdominal wall hernia contains transverse colon  without obstruction.     PELVIS: Surgical changes are seen of the cecum. Pelvic bowel loops are  unremarkable. Bladder has a normal appearance. Patient is status post  hysterectomy. Appendix is not visualized but no secondary findings of  appendicitis are seen. Chronic severe compression fracture of L3 is  noted.       Impression:      Chronic findings without acute process        This study was performed with  techniques to keep radiation doses as low  as reasonably achievable (ALARA). Individualized dose reduction  techniques using automated exposure control or adjustment of vA and/or  kV according to the patient size were employed.     This report was signed and finalized on 5/12/2025 7:57 AM by Dillon Veliz MD.       CT Head Without Contrast [[id number]] Collected: 05/12/25 0652     Updated: 05/12/25 0654    Narrative:      FINAL REPORT    TECHNIQUE:  null    CLINICAL HISTORY:  altered mental status, confusion, weakness    COMPARISON:  null    FINDINGS:  CT head without contrast    Comparison: CT/SR - CT HEAD WO CONTRAST - 1/2/25 17:57 EST    Findings:    No intra-axial mass, midline shift, hydrocephalus, or acute hemorrhage.    Mild global volume loss with subcortical and periventricular white matter hypodensity suggesting chronic microangiopathy.    The visualized paranasal sinuses and mastoid air cells are normal.    The orbits are unremarkable.    No skull fracture.      Impression:      IMPRESSION:    1. No acute intracranial findings.    Authenticated and Electronically Signed by Larissa Spivey on  05/12/2025 06:52:38 AM            Echo:    Results for orders placed during the hospital encounter of 05/03/25    Adult Transthoracic Echo Complete W/ Cont if Necessary Per Protocol    Interpretation Summary    Left ventricular systolic function is normal. Calculated left ventricular EF = 57.3%    Left ventricular wall thickness is consistent with mild septal asymmetric hypertrophy.    Left ventricular diastolic function was indeterminate.    Moderately reduced right ventricular systolic function noted.    Left atrial volume is mildly increased.    Mild aortic valve stenosis is present.    Estimated right ventricular systolic pressure from tricuspid regurgitation is normal (<35 mmHg).    Saline test results are negative for intracardiac shunting.    Condition on Discharge:      Stable.    Code status during the  hospital stay:    Code Status and Medical Interventions: No CPR (Do Not Attempt to Resuscitate); Limited Support; No intubation (DNI)   Ordered at: 05/13/25 1228     Code Status (Patient has no pulse and is not breathing):    No CPR (Do Not Attempt to Resuscitate)     Medical Interventions (Patient has pulse or is breathing):    Limited Support     Medical Intervention Limits:    No intubation (DNI)     Level Of Support Discussed With:    Patient     Discharge Disposition:        Discharge Medications:       Discharge Medications        ASK your doctor about these medications        Instructions Start Date   albuterol (2.5 MG/3ML) 0.083% nebulizer solution  Commonly known as: PROVENTIL   2.5 mg, Nebulization, Every 4 Hours PRN      albuterol sulfate  (90 Base) MCG/ACT inhaler  Commonly known as: PROVENTIL HFA;VENTOLIN HFA;PROAIR HFA   2 puffs, Inhalation, Every 4 Hours PRN      amiodarone 200 MG tablet  Commonly known as: PACERONE   200 mg, Oral, Every 24 Hours Scheduled      apixaban 5 MG tablet tablet  Commonly known as: Eliquis   5 mg, Oral, Every 12 Hours Scheduled      atorvastatin 40 MG tablet  Commonly known as: Lipitor   40 mg, Oral, Daily      benzonatate 100 MG capsule  Commonly known as: TESSALON   100 mg, Oral, 3 Times Daily PRN      Breztri Aerosphere 160-9-4.8 MCG/ACT aerosol inhaler  Generic drug: Budeson-Glycopyrrol-Formoterol   2 puffs, Inhalation, 2 Times Daily, Rinse mouth out after use      clotrimazole-betamethasone 1-0.05 % cream  Commonly known as: Lotrisone   1 Application, Topical, 2 Times Daily      diazePAM 5 MG tablet  Commonly known as: Valium   5 mg, Oral, Every 8 Hours PRN      Diclofenac Sodium 1 % gel gel  Commonly known as: VOLTAREN   4 g, Topical, 4 Times Daily PRN      DULoxetine 60 MG capsule  Commonly known as: CYMBALTA   60 mg, Oral, 2 Times Daily      fluconazole 100 MG tablet  Commonly known as: Diflucan   100 mg, Oral, Daily      fluticasone 50 MCG/ACT nasal  spray  Commonly known as: FLONASE   2 sprays, Nasal, Daily      furosemide 20 MG tablet  Commonly known as: LASIX   20 mg, Oral, Daily PRN      ipratropium-albuterol 0.5-2.5 mg/3 ml nebulizer  Commonly known as: DUO-NEB   USE 3 mL VIA NEBULIZER EVERY 4 HOURS AS NEEDED FOR WHEEZING      levocetirizine 5 MG tablet  Commonly known as: XYZAL   5 mg, Every Evening      meclizine 25 MG tablet  Commonly known as: ANTIVERT   25 mg, Oral, 3 Times Daily PRN      metoprolol succinate XL 50 MG 24 hr tablet  Commonly known as: TOPROL-XL   50 mg, Oral, Every 24 Hours Scheduled      naloxone 4 MG/0.1ML nasal spray  Commonly known as: NARCAN   4 mg      O2  Commonly known as: OXYGEN   2 L/min, As Needed      ondansetron ODT 8 MG disintegrating tablet  Commonly known as: ZOFRAN-ODT   8 mg, Translingual, Every 8 Hours PRN      oxyCODONE-acetaminophen 7.5-325 MG per tablet  Commonly known as: PERCOCET   1 tablet, Oral, Every 4 Hours PRN      pantoprazole 40 MG EC tablet  Commonly known as: PROTONIX   40 mg, Oral, Daily      prochlorperazine 5 MG tablet  Commonly known as: COMPAZINE   5 mg, Oral, Every 6 Hours PRN      sertraline 100 MG tablet  Commonly known as: ZOLOFT   TAKE 1 & 1/2 TABLETS BY MOUTH DAILY      sodium chloride 0.65 % nasal spray   2 sprays, Nasal, As Needed      saline gel nasal gel   1 Application, Topical, As Needed      traZODone 50 MG tablet  Commonly known as: DESYREL   25 mg, Oral, Nightly             Discharge Diet:       Activity at Discharge:       Follow-up Appointments:      Future Appointments   Date Time Provider Department Center   5/22/2025  3:30 PM Taiwo Curry MD [facility]   5/27/2025  1:30 PM Doris Juárez APRN [facility]   5/29/2025  2:00 PM Britt Farrar APRN [facility]     Test Results Pending at Discharge:    Pending Results       None                 REGINA Prado  05/16/25  12:33 EDT    Time: I spent >30 minutes on this discharge activity which  included: face-to-face encounter with the patient, reviewing the data in the system, coordination of the care with the nursing staff as well as consultants, documentation, and entering orders.     Dictated utilizing Dragon dictation.

## 2025-05-19 ENCOUNTER — TRANSITIONAL CARE MANAGEMENT TELEPHONE ENCOUNTER (OUTPATIENT)
Dept: CALL CENTER | Facility: HOSPITAL | Age: 80
End: 2025-05-19
Payer: MEDICARE

## 2025-05-19 ENCOUNTER — PATIENT OUTREACH (OUTPATIENT)
Age: 80
End: 2025-05-19
Payer: MEDICARE

## 2025-05-19 NOTE — OUTREACH NOTE
SW attempted to contact pt a second time, and scheduled a third call for one week out. SW left a voicemail requesting a call back. SW will attempt again in a week.     Hunter LEES -   Ambulatory Case Management    5/19/2025, 09:54 EDT

## 2025-05-19 NOTE — OUTREACH NOTE
Call Center TCM Note      Flowsheet Row Responses   [facility] patient discharged from? [facility]   Does the patient have one of the following disease processes/diagnoses(primary or secondary)? COPD   TCM attempt successful? No   Unsuccessful attempts Attempt Bogdan FERRERA - Registered Nurse    5/19/2025, 10:55 EDT

## 2025-05-19 NOTE — OUTREACH NOTE
Call Center TCM Note      Flowsheet Row Responses   [facility] facility patient discharged from? [facility]   Does the patient have one of the following disease processes/diagnoses(primary or secondary)? COPD   TCM attempt successful? No   Unsuccessful attempts Attempt 1  [Malu daughter on verbal release,  no answer]            Cande FERRERA - Registered Nurse    5/19/2025, 10:35 EDT

## 2025-05-20 ENCOUNTER — PATIENT OUTREACH (OUTPATIENT)
Age: 80
End: 2025-05-20
Payer: MEDICARE

## 2025-05-20 ENCOUNTER — TRANSITIONAL CARE MANAGEMENT TELEPHONE ENCOUNTER (OUTPATIENT)
Dept: CALL CENTER | Facility: HOSPITAL | Age: 80
End: 2025-05-20
Payer: MEDICARE

## 2025-05-20 NOTE — OUTREACH NOTE
Call Center TCM Note      Flowsheet Row Responses   [facility] patient discharged from? [facility]   Does the patient have one of the following disease processes/diagnoses(primary or secondary)? Other   TCM attempt successful? No   Unsuccessful attempts Attempt 3   Call Status Voice mail issues   Comments Cardiology 8/21/24   Does the patient have an appointment with their PCP within 7-14 days of discharge? Yes  [8/19/2024 at 11:30 AM]            Daisy Fam RN    8/15/2024, 12:05 EDT         [id number]

## 2025-05-20 NOTE — OUTREACH NOTE
Call Center TCM Note      Flowsheet Row Responses   [facility] patient discharged from? [facility]   Does the patient have one of the following disease processes/diagnoses(primary or secondary)? COPD   TCM attempt successful? No  [verbal release for Malu Aguilar]   Unsuccessful attempts Attempt 3  [attempted pt as well as daughter]            EILEEN TRAMMELL - Registered Nurse    5/20/2025, 08:54 EDT

## 2025-05-20 NOTE — OUTREACH NOTE
UTRx3. SW has attempted to contact pt with UTRx3. SW will D/c pt due to UTR, and left a voicemail requesting a return call. Pt may contact SW and received services, should they be needed in the future.     Hunter LEES -   Ambulatory Case Management    5/20/2025, 13:16 EDT

## 2025-05-28 ENCOUNTER — PATIENT OUTREACH (OUTPATIENT)
Age: 80
End: 2025-05-28
Payer: MEDICARE

## 2025-05-28 DIAGNOSIS — I48.0 PAROXYSMAL ATRIAL FIBRILLATION: ICD-10-CM

## 2025-05-28 NOTE — OUTREACH NOTE
UTRx3. SW has attempted to contact pt with UTRx3. SW will D/c pt due to UTR, if no response within a week. SW left a message with pt daughter requesting a call back. Pt may contact SW and received services, should they be needed in the future.     Hunter LEES -   Ambulatory Case Management    5/28/2025, 11:46 EDT

## 2025-05-30 ENCOUNTER — PATIENT OUTREACH (OUTPATIENT)
Dept: CASE MANAGEMENT | Facility: OTHER | Age: 80
End: 2025-05-30
Payer: MEDICARE

## 2025-05-30 DIAGNOSIS — J96.22 ACUTE ON CHRONIC RESPIRATORY FAILURE WITH HYPOXIA AND HYPERCAPNIA: Primary | ICD-10-CM

## 2025-05-30 DIAGNOSIS — J44.1 COPD EXACERBATION: ICD-10-CM

## 2025-05-30 DIAGNOSIS — J96.21 ACUTE ON CHRONIC RESPIRATORY FAILURE WITH HYPOXIA AND HYPERCAPNIA: Primary | ICD-10-CM

## 2025-05-30 NOTE — OUTREACH NOTE
[facility] End of Month Documentation    This Chronic Medical Management Care Plan for Gabi Dudley, 80 y.o. female, has been a new plan of care implemented; monitored and managed and a new plan of care implemented for the month of May.  A cumulative time of 112  minutes was spent on this patient record this month, including phone call with patient; phone call with relative; phone call with care giver; electronic communication with patient; electronic communication with primary care provider; chart review, [facility], 24/7RN, R ADM, Medications, home health, referral to , recommended F/U.    Regarding the patient's problems: has Adrenal adenoma; Anxiety; Chronic fatigue; Fibromyalgia; Hyperlipidemia; Essential hypertension; Insomnia; Osteoarthritis of knee; Osteoporosis; Pulmonary emphysema; Simple renal cyst; Tension headache; Vitamin D deficiency; Diverticulosis; Inversion of nipple; Paroxysmal atrial fibrillation; Coronary artery disease involving native coronary artery of native heart without angina pectoris; Autonomic dysfunction; Gastroesophageal reflux disease without esophagitis; Urge incontinence; Erythrasma; Compression fracture of T5 vertebra; Lung nodule; COPD (chronic obstructive pulmonary disease); Overweight (BMI 25.0-29.9); Acute on chronic respiratory failure with hypoxia; Acute on chronic respiratory failure with hypoxia and hypercapnia; Iron deficiency anemia; Impaired mobility and ADLs; Acute respiratory failure with hypoxia and hypercapnia; Aspiration pneumonia of right lower lobe; Partial small bowel obstruction; Moderate malnutrition; Bowel obstruction; Enteritis; Diverticulitis; Elevated troponin; Chronic combined systolic and diastolic CHF (congestive heart failure); NSTEMI (non-ST elevated myocardial infarction); COPD exacerbation; Altered mental status; and Altered mental status, unspecified on their problem list., the following items were addressed: medications; medical records; referrals to  community service providers, [facility], 24/7RN, R ADM, Medications, home health, referral to [facility], recommended F/U and any changes can be found within the plan section of the note.  A detailed listing of time spent for chronic care management is tracked within each outreach encounter.  Current medications include:  has a current medication list which includes the following prescription(s): albuterol, albuterol sulfate hfa, amiodarone, apixaban, atorvastatin, benzonatate, breztri aerosphere, clotrimazole-betamethasone, diazepam, diclofenac sodium, duloxetine, fluticasone, furosemide, ipratropium-albuterol, levocetirizine, losartan, meclizine, metoprolol succinate xl, naloxone, o2, ondansetron odt, oxycodone-acetaminophen, pantoprazole, prochlorperazine, saline, sertraline, and trazodone. and the patient is reported to be patient is compliant with medication protocol; caregiver will take responsibility for med compliance, Non adherent to BiPAP,  Medications are reported to be non-effective in controlling symptoms and changes have been made to the medication protocol.  Regarding these diagnoses, referrals were made to the following provider(s):  [facility].  All notes on chart for PCP to review.    The patient was monitored remotely for medications; weight, Recommended daily weights; monitoring for fluid/edema, 2-3lb wt gain in 24 hours or 5/week.    The patient's physical needs include:  needs assistance with ADLs; help taking medications as prescribed; DME supplies; medication education, Requesting hospital bed.     The patient's mental support needs include:  not applicable; needs met    The patient's cognitive support needs include:  not applicable; needs met    The patient's psychosocial support needs include:  needs met; not applicable    The patient's functional needs include: DME; needs assistance for ADLs; medication education, Requesting hospital bed, referral placed to ANANT REZA    The patient's environmental needs  include:  not applicable, Resides with daughter, daughter works from home    Care Plan overall comments:  CCM, BH24/7RN, [facility] ADM, Medications, home health, referral to SW, recommended F/U    Refer to previous outreach notes for more information on the areas listed above.    Monthly Billing Diagnoses  (J96.21,  J96.22) Acute on chronic respiratory failure with hypoxia and hypercapnia    (J44.1) COPD exacerbation    Medications   Medications have been reconciled    Care Plan progress this month:      Recently Modified Care Plans Updates made since 4/29/2025 12:00 AM      No recently modified care plans.             COPD       Track and Manage My Symptoms (Progressing)       Start:  05/08/25    Expected End:  06/30/27         My COPD Symptoms To Do List       Start:  05/08/25       Why is this important?Tracking your symptoms and other information about your health helps your doctor plan your care.Write down the symptoms, the time of day, what you were doing and what medicine you are taking.You will soon learn how to manage your symptoms.       Initial    My COPD Symptoms To Do List: follow rescue plan if symptoms flare up taken at 05/08/25            Track and Manage My Triggers (Progressing)       Start:  05/08/25    Expected End:  06/30/27         My COPD Triggers To Do List       Start:  05/08/25       Why is this important?Triggers are activities or things, like tobacco smoke or cold weather, that make your COPD (chronic obstructive pulmonary disease) flare up.Knowing these triggers helps you plan how to stay away from them.When you cannot remove them, you can learn how to manage them.       Initial    My COPD Triggers To Do List: listen for public air quality announcements every day taken at 05/08/25            Manage My Fatigue (Tiredness) (Progressing)       Start:  05/08/25    Expected End:  06/30/27         My Fatigue Management To Do List       Start:  05/08/25       Why is this important?Feeling tired or  worn out is a common symptom of COPD (chronic obstructive pulmonary disease).Learning when you feel your best and when you need rest is important.Managing the tiredness (fatigue) will help you be active and enjoy life.       Initial    My Fatigue Management To Do List: keep bedroom cool and dark;develop a new routine to improve sleep;limit daytime naps;eat healthy taken at 05/08/25            Learn and Do Breathing Exercises (Progressing)       Start:  05/08/25    Expected End:  06/30/27         My Breathing Exercises To Do List       Start:  05/08/25       Why is this important?Breathing exercises can help lessen the cough that comes with chronic obstructive pulmonary disease.Doing the exercises will give you more energy.They will also help you to control your symptoms.       Initial    My Breathing Exercises To Do List: do exercises in a comfortable position that makes breathing as easy as possible taken at 05/08/25            Optimal Care Coordination of a Patient Experiencing COPD (Progressing)       Start:  05/08/25    Expected End:  06/30/27         Support Psychosocial Response to COPD       Start:  05/08/25          Initial    Support Psychosocial Response to COPD: caregiver support provided;emotional support provided;family involvement promoted;verbalization of feelings encouraged taken at 05/08/25         Alleviate Barriers to COPD Management       Start:  05/08/25          Initial    Alleviate Barriers to COPD Management: action plan reviewed;communicable disease prevention promoted;medication-adherence assessment completed;self-awareness of symptom triggers encouraged taken at 05/08/25         Identify and Minimize Risk of COPD Exacerbation       Start:  05/08/25          Initial    Identify and Minimize Risk of COPD Exacerbation: breathing techniques encouraged;participation in pulmonary rehabilitation encouraged;rescue (action) plan reviewed;signs/symptoms of worsening disease assessed;barriers to  treatment reviewed and addressed taken at 05/08/25             Current Specialty Plan of Care Status signed by both patient and provider    Instructions   Patient was provided an electronic copy of care plan  CCM services were explained and offered and patient has accepted these services.  Patient has given their written consent to receive CCM services and understands that this includes the authorization of electronic communication of medical information with the other treating providers.  Patient understands that they may stop CCM services at any time and these changes will be effective at the end of the calendar month and will effectively revocate the agreement of CCM services.  Patient understands that only one practitioner can furnish and be paid for CCM services during one calendar month.  Patient also understands that there may be co-payment or deductible fees in association with CCM services.  Patient will continue with at least monthly follow-up calls with the Ambulatory .    Soledad JACOBS  Ambulatory Case Management    5/30/2025, 11:22 EDT

## 2025-06-06 ENCOUNTER — PATIENT OUTREACH (OUTPATIENT)
Age: 80
End: 2025-06-06
Payer: MEDICARE

## 2025-06-06 NOTE — OUTREACH NOTE
Social Work Assessment  Questions/Answers      Flowsheet Row Most Recent Value   Referral Source patient   Reason for Consult community resources   Preferred Language English   People in Home child(ashely), adult   Current Living Arrangements home   Potentially Unsafe Housing Conditions none   In the past 12 months has the electric, gas, oil, or water company threatened to shut off services in your home? No   Primary Care Provided by child(ashely)   Provides Primary Care For no one, unable/limited ability to care for self   Family Caregiver if Needed child(ashely), adult   Family Caregiver Names matthew leigh   Quality of Family Relationships supportive   Source of Income social security   Financial/Environmental Concerns other (see comments)   Application for Public Assistance obtained public assistance pending number   Medications assistive equipment and person   Meal Preparation assistive equipment and person   Housekeeping assistive equipment and person   Laundry assistive equipment and person   Shopping assistive equipment and person   If for any reason you need help with day-to-day activities such as bathing, preparing meals, shopping, managing finances, etc., do you get the help you need? I need a lot more help   Q1: How often do you have a drink containing alcohol? Never   Q2: How many drinks containing alcohol do you have on a typical day when you are drinking? None   Q3: How often do you have six or more drinks on one occasion? Never   Audit-C Score 0        SDOH updated and reviewed with the patient during this program:  --     Alcohol Use: Not At Risk (6/6/2025)    AUDIT-C     Frequency of Alcohol Consumption: Never     Average Number of Drinks: Patient does not drink     Frequency of Binge Drinking: Never      --     Depression: Not at risk (5/4/2025)    PHQ-2     PHQ-2 Score: 0      --     Disabilities: At Risk (5/12/2025)    Disabilities     Concentrating, Remembering, or Making Decisions Difficulty: yes      Doing Errands Independently Difficulty: yes      --     Employment: Unknown (5/12/2025)    Employment     Do you want help finding or keeping work or a job?: Patient unable to answer      Financial Resource Strain: Low Risk  (6/6/2025)    Overall Financial Resource Strain (CARDIA)     Difficulty of Paying Living Expenses: Not very hard      --     Food Insecurity: No Food Insecurity (6/6/2025)    Hunger Vital Sign     Worried About Running Out of Food in the Last Year: Never true     Ran Out of Food in the Last Year: Never true      --     Health Literacy: Unknown (6/6/2025)    Education     Help with school or training?: Patient unable to answer     Preferred Language: English      --     Housing Stability: Unknown (6/6/2025)    Housing Stability Vital Sign     Unable to Pay for Housing in the Last Year: No     Homeless in the Last Year: No      --     Interpersonal Safety: Not At Risk (6/6/2025)    Humiliation, Afraid, Rape, and Kick questionnaire     Fear of Current or Ex-Partner: No     Emotionally Abused: No     Physically Abused: No     Sexually Abused: No      --     Physical Activity: Patient Declined (6/6/2025)    Exercise Vital Sign     Days of Exercise per Week: Patient declined     Minutes of Exercise per Session: Patient declined   Recent Concern: Physical Activity - Inactive (5/4/2025)    Exercise Vital Sign     Days of Exercise per Week: 0 days     Minutes of Exercise per Session: 0 min      --     Social Connections: Unknown (6/6/2025)    Social Connection and Isolation Panel [NHANES]     Marital Status:    Recent Concern: Social Connections - At Risk (6/6/2025)    Family and Community Support     Help with Day-to-Day Activities: I need a lot more help     Lonely or Isolated: Patient unable to answer      --     Stress: Stress Concern Present (6/6/2025)    [employer] - Occupational Stress Questionnaire     Feeling of Stress : To some extent      --     Tobacco  Use: Medium Risk (5/12/2025)    Patient History     Smoking Tobacco Use: Former     Smokeless Tobacco Use: Never     Passive Exposure: Past      --     Transportation Needs: No Transportation Needs (6/6/2025)    PRAPARE - Transportation     Lack of Transportation (Medical): No     Lack of Transportation (Non-Medical): No      --     Utilities: Not At Risk (6/6/2025)    [facility] Utilities     Threatened with loss of utilities: No      Continuing Care   Community & [facility] AGENCY ON AGING & INDEPENDENT LIVING    [address] [facility] [address]    Phone: [phone]    Request Status: Accepted    Services: Elder Community Support/Recreation    Resource for: Social Connections   SDOH updated and reviewed with the patient during this program:  --     Alcohol Use: Not At Risk (6/6/2025)    AUDIT-C     Frequency of Alcohol Consumption: Never     Average Number of Drinks: Patient does not drink     Frequency of Binge Drinking: Never      --     Depression: Not at risk (5/4/2025)    PHQ-2     PHQ-2 Score: 0      --     Disabilities: At Risk (5/12/2025)    Disabilities     Concentrating, Remembering, or Making Decisions Difficulty: yes     Doing Errands Independently Difficulty: yes      --     Employment: Unknown (5/12/2025)    Employment     Do you want help finding or keeping work or a job?: Patient unable to answer      Financial Resource Strain: Low Risk  (6/6/2025)    Overall Financial Resource Strain (CARDIA)     Difficulty of Paying Living Expenses: Not very hard      --     Food Insecurity: No Food Insecurity (6/6/2025)    Hunger Vital Sign     Worried About Running Out of Food in the Last Year: Never true     Ran Out of Food in the Last Year: Never true      --     Health Literacy: Unknown (6/6/2025)    Education     Help with school or training?: Patient unable to answer     Preferred Language: English      --     Housing Stability: Unknown (6/6/2025)    Housing Stability Vital Sign     Unable to Pay  for Housing in the Last Year: No     Homeless in the Last Year: No      --     Interpersonal Safety: Not At Risk (6/6/2025)    Humiliation, Afraid, Rape, and Kick questionnaire     Fear of Current or Ex-Partner: No     Emotionally Abused: No     Physically Abused: No     Sexually Abused: No      --     Physical Activity: Patient Declined (6/6/2025)    Exercise Vital Sign     Days of Exercise per Week: Patient declined     Minutes of Exercise per Session: Patient declined   Recent Concern: Physical Activity - Inactive (5/4/2025)    Exercise Vital Sign     Days of Exercise per Week: 0 days     Minutes of Exercise per Session: 0 min      --     Social Connections: Unknown (6/6/2025)    Social Connection and Isolation Panel [NHANES]     Marital Status:    Recent Concern: Social Connections - At Risk (6/6/2025)    Family and Community Support     Help with Day-to-Day Activities: I need a lot more help     Lonely or Isolated: Patient unable to answer      --     Stress: Stress Concern Present (6/6/2025)    [facility] - Occupational Stress Questionnaire     Feeling of Stress : To some extent      --     Tobacco Use: Medium Risk (5/12/2025)    Patient History     Smoking Tobacco Use: Former     Smokeless Tobacco Use: Never     Passive Exposure: Past      --     Transportation Needs: No Transportation Needs (6/6/2025)    PRAPARE - Transportation     Lack of Transportation (Medical): No     Lack of Transportation (Non-Medical): No      --     Utilities: Not At Risk (6/6/2025)    [facility] Utilities     Threatened with loss of utilities: No      Continuing Care   Community & [facility] AGENCY ON AGING & INDEPENDENT LIVING    [address] [facility] [address]    Phone: [phone]    Request Status: Accepted    Services: Elder Community Support/Recreation    Resource for: Social Connections   Patient Outreach    SW spoke with pt daughter, Malu, re pt referral for home care needs.  SW and pt daughter discussed HCBW waiver and SW provided her with the number to call [facility] to get further help with this task of being screened and waitlisted. Additionally, SW and pt daughter discussed [facility] AD and that speaking with them re any programming outside of waiver that may be available may be worth exploration. SW also provided her with contact information for [facility] program contact information and over view. Pt daughter expressive of thanks. [facility] will continue to monitor for the next thirty days.     Hunter LEES -   Ambulatory Case Management    6/6/2025, 10:13 EDT

## 2025-06-07 ENCOUNTER — HOSPITAL ENCOUNTER (INPATIENT)
Facility: HOSPITAL | Age: 80
LOS: 5 days | Discharge: NURSING, REHAB OR LONG-TERM CARE | DRG: 189 | End: 2025-06-12
Attending: EMERGENCY MEDICINE | Admitting: FAMILY MEDICINE
Payer: MEDICARE

## 2025-06-07 ENCOUNTER — APPOINTMENT (OUTPATIENT)
Dept: CT IMAGING | Facility: HOSPITAL | Age: 80
DRG: 189 | End: 2025-06-07
Payer: MEDICARE

## 2025-06-07 ENCOUNTER — APPOINTMENT (OUTPATIENT)
Dept: GENERAL RADIOLOGY | Facility: HOSPITAL | Age: 80
DRG: 189 | End: 2025-06-07
Payer: MEDICARE

## 2025-06-07 DIAGNOSIS — J44.1 COPD EXACERBATION: Primary | ICD-10-CM

## 2025-06-07 DIAGNOSIS — G93.41 METABOLIC ENCEPHALOPATHY: ICD-10-CM

## 2025-06-07 DIAGNOSIS — R53.82 CHRONIC FATIGUE: ICD-10-CM

## 2025-06-07 DIAGNOSIS — F41.9 ANXIETY: ICD-10-CM

## 2025-06-07 LAB
ALBUMIN SERPL-MCNC: 3.2 G/DL (ref 3.5–5.2)
ALBUMIN/GLOB SERPL: 1.2 G/DL
ALP SERPL-CCNC: 86 U/L (ref 39–117)
ALT SERPL W P-5'-P-CCNC: 34 U/L (ref 1–33)
AMPHET+METHAMPHET UR QL: NEGATIVE
AMPHETAMINES UR QL: NEGATIVE
ANION GAP SERPL CALCULATED.3IONS-SCNC: 5 MMOL/L (ref 5–15)
AST SERPL-CCNC: 34 U/L (ref 1–32)
ATMOSPHERIC PRESS: 729 MMHG
B PARAPERT DNA SPEC QL NAA+PROBE: NOT DETECTED
B PERT DNA SPEC QL NAA+PROBE: NOT DETECTED
BARBITURATES UR QL SCN: NEGATIVE
BASE EXCESS BLDV CALC-SCNC: 20.1 MMOL/L (ref 0–2)
BASOPHILS # BLD AUTO: 0.01 10*3/MM3 (ref 0–0.2)
BASOPHILS NFR BLD AUTO: 0.3 % (ref 0–1.5)
BDY SITE: ABNORMAL
BENZODIAZ UR QL SCN: POSITIVE
BILIRUB SERPL-MCNC: 0.2 MG/DL (ref 0–1.2)
BILIRUB UR QL STRIP: NEGATIVE
BUN SERPL-MCNC: 22 MG/DL (ref 8–23)
BUN/CREAT SERPL: 44.9 (ref 7–25)
BUPRENORPHINE SERPL-MCNC: NEGATIVE NG/ML
C PNEUM DNA NPH QL NAA+NON-PROBE: NOT DETECTED
CALCIUM SPEC-SCNC: 9.5 MG/DL (ref 8.6–10.5)
CANNABINOIDS SERPL QL: NEGATIVE
CHLORIDE SERPL-SCNC: 97 MMOL/L (ref 98–107)
CLARITY UR: CLEAR
CO2 SERPL-SCNC: 44 MMOL/L (ref 22–29)
COCAINE UR QL: NEGATIVE
COHGB MFR BLD: 1.3 % (ref 0–5)
COLOR UR: YELLOW
CREAT SERPL-MCNC: 0.49 MG/DL (ref 0.57–1)
D-LACTATE SERPL-SCNC: 0.5 MMOL/L (ref 0.5–2)
DEPRECATED RDW RBC AUTO: 52.5 FL (ref 37–54)
EGFRCR SERPLBLD CKD-EPI 2021: 96.6 ML/MIN/1.73
EOSINOPHIL # BLD AUTO: 0.03 10*3/MM3 (ref 0–0.4)
EOSINOPHIL NFR BLD AUTO: 0.8 % (ref 0.3–6.2)
ERYTHROCYTE [DISTWIDTH] IN BLOOD BY AUTOMATED COUNT: 15.2 % (ref 12.3–15.4)
FENTANYL UR-MCNC: NEGATIVE NG/ML
FLUAV SUBTYP SPEC NAA+PROBE: NOT DETECTED
FLUBV RNA ISLT QL NAA+PROBE: NOT DETECTED
GAS FLOW AIRWAY: 2 LPM
GLOBULIN UR ELPH-MCNC: 2.7 GM/DL
GLUCOSE BLDC GLUCOMTR-MCNC: 90 MG/DL (ref 70–130)
GLUCOSE SERPL-MCNC: 81 MG/DL (ref 65–99)
GLUCOSE UR STRIP-MCNC: NEGATIVE MG/DL
HADV DNA SPEC NAA+PROBE: NOT DETECTED
HCO3 BLDV-SCNC: 49.9 MMOL/L (ref 22–28)
HCOV 229E RNA SPEC QL NAA+PROBE: NOT DETECTED
HCOV HKU1 RNA SPEC QL NAA+PROBE: NOT DETECTED
HCOV NL63 RNA SPEC QL NAA+PROBE: NOT DETECTED
HCOV OC43 RNA SPEC QL NAA+PROBE: NOT DETECTED
HCT VFR BLD AUTO: 32.7 % (ref 34–46.6)
HGB BLD-MCNC: 9.1 G/DL (ref 12–15.9)
HGB UR QL STRIP.AUTO: NEGATIVE
HMPV RNA NPH QL NAA+NON-PROBE: NOT DETECTED
HOLD SPECIMEN: NORMAL
HOLD SPECIMEN: NORMAL
HPIV1 RNA ISLT QL NAA+PROBE: NOT DETECTED
HPIV2 RNA SPEC QL NAA+PROBE: NOT DETECTED
HPIV3 RNA NPH QL NAA+PROBE: NOT DETECTED
HPIV4 P GENE NPH QL NAA+PROBE: NOT DETECTED
HYPOCHROMIA BLD QL: NORMAL
IMM GRANULOCYTES # BLD AUTO: 0.02 10*3/MM3 (ref 0–0.05)
IMM GRANULOCYTES NFR BLD AUTO: 0.5 % (ref 0–0.5)
KETONES UR QL STRIP: NEGATIVE
LEUKOCYTE ESTERASE UR QL STRIP.AUTO: NEGATIVE
LYMPHOCYTES # BLD AUTO: 0.92 10*3/MM3 (ref 0.7–3.1)
LYMPHOCYTES NFR BLD AUTO: 24.1 % (ref 19.6–45.3)
Lab: ABNORMAL
Lab: ABNORMAL
M PNEUMO IGG SER IA-ACNC: NOT DETECTED
MCH RBC QN AUTO: 26.4 PG (ref 26.6–33)
MCHC RBC AUTO-ENTMCNC: 27.8 G/DL (ref 31.5–35.7)
MCV RBC AUTO: 94.8 FL (ref 79–97)
METHADONE UR QL SCN: NEGATIVE
METHGB BLD QL: 0.8 % (ref 0–3)
MODALITY: ABNORMAL
MONOCYTES # BLD AUTO: 0.29 10*3/MM3 (ref 0.1–0.9)
MONOCYTES NFR BLD AUTO: 7.6 % (ref 5–12)
NEUTROPHILS NFR BLD AUTO: 2.54 10*3/MM3 (ref 1.7–7)
NEUTROPHILS NFR BLD AUTO: 66.7 % (ref 42.7–76)
NITRITE UR QL STRIP: NEGATIVE
NOTIFIED BY: ABNORMAL
NOTIFIED WHO: ABNORMAL
NRBC BLD AUTO-RTO: 0 /100 WBC (ref 0–0.2)
NT-PROBNP SERPL-MCNC: 2739 PG/ML (ref 0–1800)
OPIATES UR QL: NEGATIVE
OXYCODONE UR QL SCN: POSITIVE
OXYHGB MFR BLDV: 41.6 % (ref 40–70)
PCO2 BLDV: 104 MM HG (ref 40–50)
PCP UR QL SCN: NEGATIVE
PH BLDV: 7.29 PH UNITS (ref 7.32–7.42)
PH UR STRIP.AUTO: 6 [PH] (ref 5–8)
PLATELET # BLD AUTO: 188 10*3/MM3 (ref 140–450)
PMV BLD AUTO: 10.2 FL (ref 6–12)
PO2 BLDV: 26.6 MM HG (ref 30–50)
POTASSIUM SERPL-SCNC: 4.2 MMOL/L (ref 3.5–5.2)
PROCALCITONIN SERPL-MCNC: 0.07 NG/ML (ref 0–0.25)
PROT SERPL-MCNC: 5.9 G/DL (ref 6–8.5)
PROT UR QL STRIP: NEGATIVE
RBC # BLD AUTO: 3.45 10*6/MM3 (ref 3.77–5.28)
RHINOVIRUS RNA SPEC NAA+PROBE: NOT DETECTED
RSV RNA NPH QL NAA+NON-PROBE: NOT DETECTED
SAO2 % BLDCOV: 42.5 % (ref 45–75)
SARS-COV-2 RNA RESP QL NAA+PROBE: NOT DETECTED
SMALL PLATELETS BLD QL SMEAR: ADEQUATE
SODIUM SERPL-SCNC: 146 MMOL/L (ref 136–145)
SP GR UR STRIP: 1.02 (ref 1–1.03)
TRICYCLICS UR QL SCN: NEGATIVE
UROBILINOGEN UR QL STRIP: NORMAL
VENTILATOR MODE: ABNORMAL
WBC MORPH BLD: NORMAL
WBC NRBC COR # BLD AUTO: 3.81 10*3/MM3 (ref 3.4–10.8)
WHOLE BLOOD HOLD COAG: NORMAL
WHOLE BLOOD HOLD SPECIMEN: NORMAL

## 2025-06-07 PROCEDURE — P9612 CATHETERIZE FOR URINE SPEC: HCPCS

## 2025-06-07 PROCEDURE — 94761 N-INVAS EAR/PLS OXIMETRY MLT: CPT

## 2025-06-07 PROCEDURE — 94799 UNLISTED PULMONARY SVC/PX: CPT

## 2025-06-07 PROCEDURE — 70450 CT HEAD/BRAIN W/O DYE: CPT

## 2025-06-07 PROCEDURE — 99285 EMERGENCY DEPT VISIT HI MDM: CPT | Performed by: EMERGENCY MEDICINE

## 2025-06-07 PROCEDURE — 85007 BL SMEAR W/DIFF WBC COUNT: CPT | Performed by: EMERGENCY MEDICINE

## 2025-06-07 PROCEDURE — 80307 DRUG TEST PRSMV CHEM ANLYZR: CPT | Performed by: FAMILY MEDICINE

## 2025-06-07 PROCEDURE — 5A09457 ASSISTANCE WITH RESPIRATORY VENTILATION, 24-96 CONSECUTIVE HOURS, CONTINUOUS POSITIVE AIRWAY PRESSURE: ICD-10-PCS | Performed by: INTERNAL MEDICINE

## 2025-06-07 PROCEDURE — 85025 COMPLETE CBC W/AUTO DIFF WBC: CPT | Performed by: EMERGENCY MEDICINE

## 2025-06-07 PROCEDURE — 0202U NFCT DS 22 TRGT SARS-COV-2: CPT | Performed by: FAMILY MEDICINE

## 2025-06-07 PROCEDURE — 81003 URINALYSIS AUTO W/O SCOPE: CPT | Performed by: EMERGENCY MEDICINE

## 2025-06-07 PROCEDURE — 94660 CPAP INITIATION&MGMT: CPT

## 2025-06-07 PROCEDURE — 93005 ELECTROCARDIOGRAM TRACING: CPT | Performed by: EMERGENCY MEDICINE

## 2025-06-07 PROCEDURE — 63710000001 REVEFENACIN 175 MCG/3ML SOLUTION: Performed by: FAMILY MEDICINE

## 2025-06-07 PROCEDURE — 83605 ASSAY OF LACTIC ACID: CPT | Performed by: FAMILY MEDICINE

## 2025-06-07 PROCEDURE — 80053 COMPREHEN METABOLIC PANEL: CPT | Performed by: EMERGENCY MEDICINE

## 2025-06-07 PROCEDURE — 99291 CRITICAL CARE FIRST HOUR: CPT

## 2025-06-07 PROCEDURE — 25010000002 METHYLPREDNISOLONE PER 125 MG: Performed by: EMERGENCY MEDICINE

## 2025-06-07 PROCEDURE — 94640 AIRWAY INHALATION TREATMENT: CPT

## 2025-06-07 PROCEDURE — 99223 1ST HOSP IP/OBS HIGH 75: CPT | Performed by: FAMILY MEDICINE

## 2025-06-07 PROCEDURE — 25010000002 METHYLPREDNISOLONE PER 40 MG: Performed by: FAMILY MEDICINE

## 2025-06-07 PROCEDURE — 82805 BLOOD GASES W/O2 SATURATION: CPT

## 2025-06-07 PROCEDURE — 82948 REAGENT STRIP/BLOOD GLUCOSE: CPT

## 2025-06-07 PROCEDURE — 71045 X-RAY EXAM CHEST 1 VIEW: CPT

## 2025-06-07 PROCEDURE — 84145 PROCALCITONIN (PCT): CPT | Performed by: FAMILY MEDICINE

## 2025-06-07 PROCEDURE — 83880 ASSAY OF NATRIURETIC PEPTIDE: CPT | Performed by: FAMILY MEDICINE

## 2025-06-07 PROCEDURE — 82820 HEMOGLOBIN-OXYGEN AFFINITY: CPT

## 2025-06-07 RX ORDER — ONDANSETRON 4 MG/1
4 TABLET, ORALLY DISINTEGRATING ORAL EVERY 6 HOURS PRN
Status: DISCONTINUED | OUTPATIENT
Start: 2025-06-07 | End: 2025-06-12 | Stop reason: HOSPADM

## 2025-06-07 RX ORDER — AMOXICILLIN 250 MG
2 CAPSULE ORAL 2 TIMES DAILY PRN
Status: DISCONTINUED | OUTPATIENT
Start: 2025-06-07 | End: 2025-06-12 | Stop reason: HOSPADM

## 2025-06-07 RX ORDER — NITROGLYCERIN 0.4 MG/1
0.4 TABLET SUBLINGUAL
Status: DISCONTINUED | OUTPATIENT
Start: 2025-06-07 | End: 2025-06-12 | Stop reason: HOSPADM

## 2025-06-07 RX ORDER — METHYLPREDNISOLONE SODIUM SUCCINATE 125 MG/2ML
125 INJECTION, POWDER, LYOPHILIZED, FOR SOLUTION INTRAMUSCULAR; INTRAVENOUS ONCE
Status: COMPLETED | OUTPATIENT
Start: 2025-06-07 | End: 2025-06-07

## 2025-06-07 RX ORDER — ARFORMOTEROL TARTRATE 15 UG/2ML
15 SOLUTION RESPIRATORY (INHALATION)
Status: DISCONTINUED | OUTPATIENT
Start: 2025-06-07 | End: 2025-06-12 | Stop reason: HOSPADM

## 2025-06-07 RX ORDER — LOSARTAN POTASSIUM 25 MG/1
25 TABLET ORAL DAILY
Status: DISCONTINUED | OUTPATIENT
Start: 2025-06-07 | End: 2025-06-12 | Stop reason: HOSPADM

## 2025-06-07 RX ORDER — ONDANSETRON 2 MG/ML
4 INJECTION INTRAMUSCULAR; INTRAVENOUS EVERY 6 HOURS PRN
Status: DISCONTINUED | OUTPATIENT
Start: 2025-06-07 | End: 2025-06-12 | Stop reason: HOSPADM

## 2025-06-07 RX ORDER — FUROSEMIDE 20 MG/1
20 TABLET ORAL DAILY
Status: DISCONTINUED | OUTPATIENT
Start: 2025-06-07 | End: 2025-06-12 | Stop reason: HOSPADM

## 2025-06-07 RX ORDER — AMIODARONE HYDROCHLORIDE 200 MG/1
200 TABLET ORAL
Status: DISCONTINUED | OUTPATIENT
Start: 2025-06-07 | End: 2025-06-12 | Stop reason: HOSPADM

## 2025-06-07 RX ORDER — METOPROLOL SUCCINATE 50 MG/1
50 TABLET, EXTENDED RELEASE ORAL DAILY
Status: DISCONTINUED | OUTPATIENT
Start: 2025-06-07 | End: 2025-06-12 | Stop reason: HOSPADM

## 2025-06-07 RX ORDER — IPRATROPIUM BROMIDE AND ALBUTEROL SULFATE 2.5; .5 MG/3ML; MG/3ML
3 SOLUTION RESPIRATORY (INHALATION) EVERY 4 HOURS PRN
Status: DISCONTINUED | OUTPATIENT
Start: 2025-06-07 | End: 2025-06-12 | Stop reason: HOSPADM

## 2025-06-07 RX ORDER — IPRATROPIUM BROMIDE AND ALBUTEROL SULFATE 2.5; .5 MG/3ML; MG/3ML
3 SOLUTION RESPIRATORY (INHALATION) ONCE
Status: COMPLETED | OUTPATIENT
Start: 2025-06-07 | End: 2025-06-07

## 2025-06-07 RX ORDER — ATORVASTATIN CALCIUM 40 MG/1
40 TABLET, FILM COATED ORAL DAILY
Status: DISCONTINUED | OUTPATIENT
Start: 2025-06-07 | End: 2025-06-12 | Stop reason: HOSPADM

## 2025-06-07 RX ORDER — METHYLPREDNISOLONE SODIUM SUCCINATE 40 MG/ML
40 INJECTION, POWDER, LYOPHILIZED, FOR SOLUTION INTRAMUSCULAR; INTRAVENOUS EVERY 12 HOURS
Status: DISCONTINUED | OUTPATIENT
Start: 2025-06-07 | End: 2025-06-10

## 2025-06-07 RX ORDER — BENZONATATE 100 MG/1
100 CAPSULE ORAL 3 TIMES DAILY PRN
Status: DISCONTINUED | OUTPATIENT
Start: 2025-06-07 | End: 2025-06-12 | Stop reason: HOSPADM

## 2025-06-07 RX ORDER — BUDESONIDE 0.5 MG/2ML
0.5 INHALANT ORAL
Status: DISCONTINUED | OUTPATIENT
Start: 2025-06-07 | End: 2025-06-12 | Stop reason: HOSPADM

## 2025-06-07 RX ORDER — POLYETHYLENE GLYCOL 3350 17 G/17G
17 POWDER, FOR SOLUTION ORAL DAILY PRN
Status: DISCONTINUED | OUTPATIENT
Start: 2025-06-07 | End: 2025-06-12 | Stop reason: HOSPADM

## 2025-06-07 RX ORDER — IPRATROPIUM BROMIDE AND ALBUTEROL SULFATE 2.5; .5 MG/3ML; MG/3ML
6 SOLUTION RESPIRATORY (INHALATION) ONCE
Status: COMPLETED | OUTPATIENT
Start: 2025-06-07 | End: 2025-06-07

## 2025-06-07 RX ORDER — DULOXETIN HYDROCHLORIDE 30 MG/1
60 CAPSULE, DELAYED RELEASE ORAL 2 TIMES DAILY
Status: DISCONTINUED | OUTPATIENT
Start: 2025-06-07 | End: 2025-06-12 | Stop reason: HOSPADM

## 2025-06-07 RX ORDER — QUETIAPINE FUMARATE 25 MG/1
25 TABLET, FILM COATED ORAL EVERY 6 HOURS PRN
Status: DISCONTINUED | OUTPATIENT
Start: 2025-06-07 | End: 2025-06-12 | Stop reason: HOSPADM

## 2025-06-07 RX ORDER — SODIUM CHLORIDE 0.9 % (FLUSH) 0.9 %
10 SYRINGE (ML) INJECTION AS NEEDED
Status: DISCONTINUED | OUTPATIENT
Start: 2025-06-07 | End: 2025-06-12 | Stop reason: HOSPADM

## 2025-06-07 RX ORDER — ACETAMINOPHEN 160 MG/5ML
650 SOLUTION ORAL EVERY 4 HOURS PRN
Status: DISCONTINUED | OUTPATIENT
Start: 2025-06-07 | End: 2025-06-12 | Stop reason: HOSPADM

## 2025-06-07 RX ORDER — PANTOPRAZOLE SODIUM 40 MG/1
40 TABLET, DELAYED RELEASE ORAL DAILY
Status: DISCONTINUED | OUTPATIENT
Start: 2025-06-07 | End: 2025-06-12 | Stop reason: HOSPADM

## 2025-06-07 RX ORDER — SODIUM CHLORIDE 0.9 % (FLUSH) 0.9 %
10 SYRINGE (ML) INJECTION EVERY 12 HOURS SCHEDULED
Status: DISCONTINUED | OUTPATIENT
Start: 2025-06-07 | End: 2025-06-12 | Stop reason: HOSPADM

## 2025-06-07 RX ORDER — SODIUM CHLORIDE 9 MG/ML
40 INJECTION, SOLUTION INTRAVENOUS AS NEEDED
Status: DISCONTINUED | OUTPATIENT
Start: 2025-06-07 | End: 2025-06-12 | Stop reason: HOSPADM

## 2025-06-07 RX ORDER — ACETAMINOPHEN 650 MG/1
650 SUPPOSITORY RECTAL EVERY 4 HOURS PRN
Status: DISCONTINUED | OUTPATIENT
Start: 2025-06-07 | End: 2025-06-12 | Stop reason: HOSPADM

## 2025-06-07 RX ORDER — BISACODYL 5 MG/1
5 TABLET, DELAYED RELEASE ORAL DAILY PRN
Status: DISCONTINUED | OUTPATIENT
Start: 2025-06-07 | End: 2025-06-12 | Stop reason: HOSPADM

## 2025-06-07 RX ORDER — ALUMINA, MAGNESIA, AND SIMETHICONE 2400; 2400; 240 MG/30ML; MG/30ML; MG/30ML
15 SUSPENSION ORAL EVERY 6 HOURS PRN
Status: DISCONTINUED | OUTPATIENT
Start: 2025-06-07 | End: 2025-06-12 | Stop reason: HOSPADM

## 2025-06-07 RX ORDER — ACETAMINOPHEN 325 MG/1
650 TABLET ORAL EVERY 4 HOURS PRN
Status: DISCONTINUED | OUTPATIENT
Start: 2025-06-07 | End: 2025-06-12 | Stop reason: HOSPADM

## 2025-06-07 RX ORDER — BISACODYL 10 MG
10 SUPPOSITORY, RECTAL RECTAL DAILY PRN
Status: DISCONTINUED | OUTPATIENT
Start: 2025-06-07 | End: 2025-06-12 | Stop reason: HOSPADM

## 2025-06-07 RX ADMIN — IPRATROPIUM BROMIDE AND ALBUTEROL SULFATE 3 ML: 2.5; .5 SOLUTION RESPIRATORY (INHALATION) at 03:49

## 2025-06-07 RX ADMIN — ATORVASTATIN CALCIUM 40 MG: 40 TABLET, FILM COATED ORAL at 09:01

## 2025-06-07 RX ADMIN — SERTRALINE 100 MG: 50 TABLET, FILM COATED ORAL at 09:01

## 2025-06-07 RX ADMIN — METOPROLOL SUCCINATE 50 MG: 50 TABLET, EXTENDED RELEASE ORAL at 09:01

## 2025-06-07 RX ADMIN — ARFORMOTEROL TARTRATE 15 MCG: 15 SOLUTION RESPIRATORY (INHALATION) at 07:29

## 2025-06-07 RX ADMIN — DULOXETINE 60 MG: 30 CAPSULE, DELAYED RELEASE ORAL at 09:01

## 2025-06-07 RX ADMIN — BUDESONIDE 0.5 MG: 0.5 SUSPENSION RESPIRATORY (INHALATION) at 18:42

## 2025-06-07 RX ADMIN — IPRATROPIUM BROMIDE AND ALBUTEROL SULFATE 6 ML: 2.5; .5 SOLUTION RESPIRATORY (INHALATION) at 04:56

## 2025-06-07 RX ADMIN — ACETAMINOPHEN 650 MG: 325 TABLET, FILM COATED ORAL at 17:24

## 2025-06-07 RX ADMIN — FUROSEMIDE 20 MG: 20 TABLET ORAL at 09:00

## 2025-06-07 RX ADMIN — METHYLPREDNISOLONE SODIUM SUCCINATE 40 MG: 40 INJECTION INTRAMUSCULAR; INTRAVENOUS at 16:17

## 2025-06-07 RX ADMIN — APIXABAN 5 MG: 5 TABLET, FILM COATED ORAL at 09:01

## 2025-06-07 RX ADMIN — PANTOPRAZOLE SODIUM 40 MG: 40 TABLET, DELAYED RELEASE ORAL at 09:01

## 2025-06-07 RX ADMIN — ARFORMOTEROL TARTRATE 15 MCG: 15 SOLUTION RESPIRATORY (INHALATION) at 18:42

## 2025-06-07 RX ADMIN — REVEFENACIN 175 MCG: 175 SOLUTION RESPIRATORY (INHALATION) at 07:29

## 2025-06-07 RX ADMIN — QUETIAPINE FUMARATE 25 MG: 25 TABLET ORAL at 17:24

## 2025-06-07 RX ADMIN — METHYLPREDNISOLONE SODIUM SUCCINATE 125 MG: 125 INJECTION, POWDER, FOR SOLUTION INTRAMUSCULAR; INTRAVENOUS at 04:17

## 2025-06-07 RX ADMIN — Medication 10 ML: at 22:04

## 2025-06-07 RX ADMIN — LOSARTAN POTASSIUM 25 MG: 25 TABLET, FILM COATED ORAL at 09:01

## 2025-06-07 RX ADMIN — AMIODARONE HYDROCHLORIDE 200 MG: 200 TABLET ORAL at 09:01

## 2025-06-07 RX ADMIN — BUDESONIDE 0.5 MG: 0.5 SUSPENSION RESPIRATORY (INHALATION) at 07:29

## 2025-06-07 RX ADMIN — Medication 10 ML: at 09:00

## 2025-06-07 NOTE — PROGRESS NOTES
RT EQUIPMENT DEVICE RELATED - SKIN ASSESSMENT    Jed Score:  Jed Score: 15     RT Medical Equipment/Device:     NIV Mask:  Under-the-nose   size:  b    Skin Assessment:      Cheek:  Intact  Nose:  Intact    Device Skin Pressure Protection:  Pressure points protected    Nurse Notification:  Cora Serra, CRT

## 2025-06-07 NOTE — THERAPY EVALUATION
Attempted PT evaluation x 2 this date. On first attempt, pt sleeping on bipap. On second attempt pt was awake and on 4L NC but was too confused to safely follow directions for evaluation. Will plan to check back in a.m.

## 2025-06-07 NOTE — CASE MANAGEMENT/SOCIAL WORK
Discharge Planning Assessment   Ted     Patient Name: Gabi Dudley  MRN: [id number]  Today's Date: 6/7/2025    Admit Date: 6/7/2025    Plan: to be determined pending Palliative care consult   Discharge Needs Assessment       Row Name 06/07/25 1418       Living Environment    People in Home child(ashely), adult    Current Living Arrangements home    Potentially Unsafe Housing Conditions none    In the past 12 months has the electric, gas, oil, or water company threatened to shut off services in your home? No    Primary Care Provided by child(ashely)    Provides Primary Care For no one, unable/limited ability to care for self    Family Caregiver if Needed child(ashely), adult    Quality of Family Relationships helpful    Able to Return to Prior Arrangements yes       Resource/Environmental Concerns    Resource/Environmental Concerns none    Transportation Concerns none       Transportation Needs    In the past 12 months, has lack of transportation kept you from medical appointments or from getting medications? no    In the past 12 months, has lack of transportation kept you from meetings, work, or from getting things needed for daily living? No       Food Insecurity    Within the past 12 months, you worried that your food would run out before you got the money to buy more. Never true    Within the past 12 months, the food you bought just didn't last and you didn't have money to get more. Never true       Transition Planning    Patient/Family Anticipates Transition to home with help/services    Patient/Family Anticipated Services at Transition hospice care    Transportation Anticipated family or friend will provide       Discharge Needs Assessment    Readmission Within the Last 30 Days previous discharge plan unsuccessful    Equipment Currently Used at Home commode;oxygen;negative pressure wound therapy device;Rolator    Do you want help finding or keeping work or a job? Patient unable to answer    Do you want help  with school or training? For example, starting or completing job training or getting a high school diploma, GED or equivalent Patient unable to answer    Anticipated Changes Related to Illness none                   Discharge Plan       Row Name 06/07/25 1423       Plan    Plan to be determined pending Palliative care consult    Patient/Family in Agreement with Plan yes    Plan Comments Pt sleeping soundly on a BIPAP .Called spoke to pt daughter confirmed address,phone number and primary care provider as being correct  .She lives with her daughter and until a few days ago could walk royal 15 feet . Has oxygen 4 LNC continuously DME [facility] .has a NIV not been using for the last few days Does not Have Home health Declined by patient  . Plan is for a Palliative Care consult  Monday                  Selected Continued Care - Episodes Includes continued care and service providers with selected services from the active episodes listed below      Ambulatory Social Work Case Management Episode start date: 6/6/2025      Community & DME       Service Provider Services Address Phone Fax Patient Preferred    [facility]/Recreation [address] [facility] [address] [phone] -- --                          Selected Continued Care - Prior Encounters Includes continued care and service providers with selected services from prior encounters from 3/9/2025 to 6/7/2025      Discharged on 5/16/2025 Admission date: 5/12/2025 - Discharge disposition: Home-Health Care Svc      Durable Medical Equipment       Service Provider Services Address Phone Fax Patient Preferred    [facility] [address] [place] [id number] [phone] --       Internal Comment last updated by Janiya Sotelo, RN 5/16/2025 1506    NIPPV will be delivered to pts home after discharge.                         Home Medical Care        Service Provider Services Address Phone Fax Patient Preferred    [facility] [address], [address] [address] [phone] --                      Discharged on 5/6/2025 Admission date: 5/3/2025 - Discharge disposition: Home-Health Care [facility]      Home Medical Care       Service Provider Services Address Phone Fax Patient Preferred    [facility] [address], [address] [address] [phone] --                             Demographic Summary       Row Name 06/07/25 1417       General Information    Admission Type inpatient    Arrived From emergency department    Required Notices Provided Important Message from Medicare    Referral Source admission list    Reason for Consult discharge planning    Preferred Language English                   Functional Status       Row Name 06/07/25 1417       Functional Status    Usual Activity Tolerance poor    Current Activity Tolerance poor       Physical Activity    On average, how many days per week do you engage in moderate to strenuous exercise (like a brisk walk)? 0 days    On average, how many minutes do you engage in exercise at this level? 0 min    Number of minutes of exercise per week 0       Functional Status, IADL    Medications completely dependent    Meal Preparation completely dependent    Housekeeping completely dependent    Laundry completely dependent    Shopping completely dependent    If for any reason you need help with day-to-day activities such as bathing, preparing meals, shopping, managing finances, etc., do you get the help you need? Patient unable to answer                   Psychosocial    No documentation.                  Abuse/Neglect    No documentation.                  Legal    No documentation.                  Substance Abuse    No documentation.                  Patient  Forms    No documentation.                     Pamela Dubon RN

## 2025-06-07 NOTE — H&P
[facility]   HISTORY AND PHYSICAL      Name:  Gabi Dudley   Age:  80 y.o.  Sex:  female  :  1945  MRN:  [id number]   Visit Number:  [id number]  Admission Date:  2025  Date Of Service:  25  Primary Care Physician:  Krystina Saunders DO    Chief Complaint:     Shortness of breath, confusion    History Of Presenting Illness:      Chronically ill 80-year-old with a history of end-stage COPD with chronic hypoxic/hypercapnic respiratory failure, paroxysmal atrial fibrillation/flutter, history of prior tobacco abuse, history of medical noncompliance, hypertension, GERD, bowel obstruction, anxiety, who presented to the emergency room via EMS with shortness of breath and confusion.  History somewhat limited due to patient condition at this time.  Apparently she lives with daughter, had increasing confusion for the last day or so.  Weakness, more short of breath than typical.  Patient currently arousable, but encephalopathic.  No focal deficit.  She is currently on BiPAP.    In the ER she was on BiPAP, heart rate in the 55 range afebrile blood pressure 130/80.  CBC with a white count of 3800, hemoglobin 9 platelets 188.  CMP creatinine 0.49 with a sodium 146 CO2 of 44.  Glucose of 81.  Lactic acid 0.5.  VBG pH 7.29 with base of 204.  Urinalysis unremarkable.  Chest x-ray appears similar to x-ray from May, no acute opacity.  Head CT reportedly unremarkable.  Procalcitonin, UDS, respiratory panel pending.  Patient given DuoNeb, Solu-Medrol, and placed on BiPAP.  We were asked to admit    Review Of Systems:    All systems were reviewed and negative except as mentioned in history of presenting illness, assessment and plan.    Past Medical History: Patient  has a past medical history of Adrenal adenoma, Anemia, Arrhythmia, Asthma, Atrial fibrillation, Back pain, Benign colonic polyp, Benign tumor of adrenal gland, Cataract, Cholelithiasis, Chronic bronchitis, Chronic bronchitis  with COPD (chronic obstructive pulmonary disease), COPD (chronic obstructive pulmonary disease) (2005), Coronary artery disease, Depression, Diverticulosis (Years ago), Elevated cholesterol, Environmental and seasonal allergies, Fibromyalgia, Fibromyalgia, primary (Sometime in the 90s), Gastritis, Generalized anxiety disorder, GERD (gastroesophageal reflux disease), H/O mammogram, Headache (Years ago), Hemorrhoids, History of blood transfusion (1985), History of blood transfusion (1985), History of echocardiogram, History of endometriosis, History of nuclear stress test, Hospitalization or health care facility admission within last 6 months, Hypertension, IBS (irritable bowel syndrome), Impaired functional mobility, balance, gait, and endurance, Impaired mobility, Inverted nipple, Kidney disease, Kidney stone, Liver cyst, Low back pain (Years ago), Nodular radiologic density, Nodule of left lung, NSTEMI (non-ST elevated myocardial infarction) (9/24/2024), On home oxygen therapy, Osteoarthritis, Osteopenia (Several years ago), Osteoporosis, PONV (postoperative nausea and vomiting), Renal cyst, Sinus problem, Sinusitis, Skin cancer, SOB (shortness of breath), Tobacco use, Urinary frequency, Urinary tract infection (Years ago), Vitamin D deficiency, Wears glasses, and Wears partial dentures.    Past Surgical History: Patient  has a past surgical history that includes Appendectomy (1980s); Tonsillectomy (1987); Colonoscopy w/ biopsies and polypectomy; Colonoscopy (03/11/2013); Colonoscopy (06/21/2016); Upper gastrointestinal endoscopy (01/13/2015); Vaginal delivery; Colonoscopy (N/A, 07/24/2019); Cataract extraction (2013); Hysterectomy (1980s); Cardiac catheterization (2003); Abdominal adhesion surgery; cholecystectomy with intraoperative cholangiogram (N/A, 10/30/2020); Exploratory Laparotomy (N/A, 11/23/2020); Cholecystectomy (2020); Colon surgery (2020); and Exploratory Laparotomy (N/A, 8/9/2023).    Social History:  Patient  reports that she quit smoking about 4 years ago. Her smoking use included cigarettes. She started smoking about 34 years ago. She has a 7.5 pack-year smoking history. She has been exposed to tobacco smoke. She has never used smokeless tobacco. She reports that she does not drink alcohol and does not use drugs.    Family History:  Patient's family history has been reviewed and found to be noncontributory.     Allergies:      Ativan [lorazepam] and Doxycycline    Home Medications:    Prior to Admission Medications       Prescriptions Last Dose Informant Patient Reported? Taking?    albuterol (PROVENTIL) (2.5 MG/3ML) 0.083% nebulizer solution   No No    Take 2.5 mg by nebulization Every 4 (Four) Hours As Needed for Wheezing.    albuterol sulfate  (90 Base) MCG/ACT inhaler   No No    Inhale 2 puffs Every 4 (Four) Hours As Needed for Wheezing.    amiodarone (PACERONE) 200 MG tablet   No No    Take 1 tablet by mouth Daily.    apixaban (Eliquis) 5 MG tablet tablet   No No    Take 1 tablet by mouth Every 12 (Twelve) Hours.    atorvastatin (Lipitor) 40 MG tablet   No No    Take 1 tablet by mouth Daily.    benzonatate (TESSALON) 100 MG capsule   No No    Take 1 capsule by mouth 3 (Three) Times a Day As Needed for Cough.    Budeson-Glycopyrrol-Formoterol (Breztri Aerosphere) 160-9-4.8 MCG/ACT aerosol inhaler   No No    Inhale 2 puffs 2 (Two) Times a Day. Rinse mouth out after use    clotrimazole-betamethasone (Lotrisone) 1-0.05 % cream   No No    Apply 1 Application topically to the appropriate area as directed 2 (Two) Times a Day.    diazePAM (Valium) 5 MG tablet   No No    Take 1 tablet by mouth Every 8 (Eight) Hours As Needed for Anxiety.    Diclofenac Sodium (VOLTAREN) 1 % gel gel   No No    Apply 4 g topically to the appropriate area as directed 4 (Four) Times a Day As Needed (pain).    DULoxetine (CYMBALTA) 60 MG capsule   No No    TAKE 1 CAPSULE BY MOUTH 2 (TWO) TIMES A DAY.    fluticasone (FLONASE) 50  MCG/ACT nasal spray   No No    Administer 2 sprays into the nostril(s) as directed by provider Daily.    furosemide (LASIX) 20 MG tablet   No No    Take 1 tablet by mouth Daily As Needed (SOA-edema- Greater than 2 lb weight gain) for up to 30 days.    ipratropium-albuterol (DUO-NEB) 0.5-2.5 mg/3 ml nebulizer   No No    USE 3 mL VIA NEBULIZER EVERY 4 HOURS AS NEEDED FOR WHEEZING    levocetirizine (XYZAL) 5 MG tablet   Yes No    Take 1 tablet by mouth Every Evening.    Patient not taking:  Reported on 5/29/2025    losartan (Cozaar) 25 MG tablet   No No    Take 1 tablet by mouth Daily.    meclizine (ANTIVERT) 25 MG tablet   No No    Take 1 tablet by mouth 3 (Three) Times a Day As Needed for Dizziness.    metoprolol succinate XL (Toprol XL) 100 MG 24 hr tablet   No No    Take 1 tablet by mouth Daily.    naloxone (NARCAN) 4 MG/0.1ML nasal spray   Yes No    Administer 1 spray into the nostril(s) as directed by provider.    Patient not taking:  Reported on 5/29/2025    O2 (OXYGEN)   Yes No    Inhale 4 L/min As Needed.    ondansetron ODT (ZOFRAN-ODT) 8 MG disintegrating tablet   No No    Place 1 tablet on the tongue Every 8 (Eight) Hours As Needed for Nausea.    oxyCODONE-acetaminophen (PERCOCET) 7.5-325 MG per tablet   No No    Take 1 tablet by mouth Every 4 (Four) Hours As Needed for Moderate Pain or Severe Pain for up to 5 doses.    pantoprazole (PROTONIX) 40 MG EC tablet   No No    Take 1 tablet by mouth Daily.    prochlorperazine (COMPAZINE) 5 MG tablet   No No    Take 1 tablet by mouth Every 6 (Six) Hours As Needed for Nausea or Vomiting.    saline (AYR) gel nasal gel   No No    Apply 1 Application topically to the appropriate area as directed As Needed (nasal dryness nose bleeds).    sertraline (ZOLOFT) 100 MG tablet   No No    TAKE 1 & 1/2 TABLETS BY MOUTH DAILY    traZODone (DESYREL) 50 MG tablet   Yes No    Take 1 tablet by mouth Every Night.          ED Medications:    Medications   sodium chloride 0.9 % flush 10  "mL (has no administration in time range)   methylPREDNISolone sodium succinate (SOLU-Medrol) injection 125 mg (125 mg Intravenous Given 6/7/25 9717)   ipratropium-albuterol (DUO-NEB) nebulizer solution 3 mL (3 mL Nebulization Given 6/7/25 8809)   ipratropium-albuterol (DUO-NEB) nebulizer solution 6 mL (6 mL Nebulization Given 6/7/25 6666)     Vital Signs:  Temp:  [97.6 °F (36.4 °C)] 97.6 °F (36.4 °C)  Heart Rate:  [53-55] 53  Resp:  [18] 18  BP: (117-136)/(59-90) 130/90        06/07/25  0328   Weight: 59.4 kg (131 lb)     Body mass index is 21.8 kg/m².    Physical Exam:     Most recent vital Signs: /90   Pulse 53   Temp 97.6 °F (36.4 °C) (Oral)   Resp 18   Ht 165.1 cm (65\")   Wt 59.4 kg (131 lb)   SpO2 99%   BMI 21.80 kg/m²     Physical Exam  Constitutional:       Appearance: She is ill-appearing.   HENT:      Mouth/Throat:      Mouth: Mucous membranes are dry.   Eyes:      Extraocular Movements: Extraocular movements intact.      Pupils: Pupils are equal, round, and reactive to light.   Cardiovascular:      Rate and Rhythm: Regular rhythm. Bradycardia present.      Heart sounds: No murmur heard.     No gallop.   Pulmonary:      Effort: Respiratory distress present.      Breath sounds: Wheezing and rhonchi present.   Abdominal:      General: There is no distension.      Tenderness: There is no abdominal tenderness.   Musculoskeletal:      Right lower leg: No edema.      Left lower leg: No edema.   Skin:     General: Skin is dry.      Capillary Refill: Capillary refill takes less than 2 seconds.   Neurological:      Mental Status: She is disoriented.      Motor: Weakness present.         Laboratory data:    I have reviewed the labs done in the emergency room.    Results from last 7 days   Lab Units 06/07/25  0314   SODIUM mmol/L 146*   POTASSIUM mmol/L 4.2   CHLORIDE mmol/L 97*   CO2 mmol/L 44.0*   BUN mg/dL 22.0   CREATININE mg/dL 0.49*   CALCIUM mg/dL 9.5   BILIRUBIN mg/dL 0.2   ALK PHOS U/L 86   ALT " (SGPT) U/L 34*   AST (SGOT) U/L 34*   GLUCOSE mg/dL 81     Results from last 7 days   Lab Units 06/07/25  0314   WBC 10*3/mm3 3.81   HEMOGLOBIN g/dL 9.1*   HEMATOCRIT % 32.7*   PLATELETS 10*3/mm3 188                             Results from last 7 days   Lab Units 06/07/25  0402   COLOR UA  Yellow   GLUCOSE UA  Negative   KETONES UA  Negative   BLOOD UA  Negative   LEUKOCYTES UA  Negative   PH, URINE  6.0   BILIRUBIN UA  Negative   UROBILINOGEN UA  1.0 E.U./dL       Pain Management Panel          Latest Ref Rng & Units 8/11/2023   Pain Management Panel   Creatinine, Urine mg/dL 105.2        EKG:      Pending    Radiology:    No radiology results for the last 3 days    Assessment:    COPD exacerbation  Acute on chronic hypoxic/hypercapnic respiratory failure  Acute metabolic encephalopathy likely secondary to #2  History of paroxysmal atrial fibrillation/flutter  History of diastolic/systolic CHF  Anxiety depression  History of medical noncompliance    Plan:    Admit as inpatient    Respiratory failure/COPD/encephalopathy:  Patient on sedating medications, in setting of end-stage COPD and advanced hypercapnic respiratory failure with history of noncompliance with AVAPS.  No obvious infectious source, will check respiratory panel and procalcitonin.  Placed on bronchodilators, Solu-Medrol, continue use of BiPAP.  Anticipate mental status will improve with treatment of CO2 narcosis    Atrial fibrillation/flutter/combined CHF:  Check proBNP.  Volume status appears fair on exam.  Continue with oral diuretic.  Continue with amiodarone and Eliquis.  Telemetry monitoring.    Impaired mobility and ADLs/anxiety/depression:  Overall prognosis fairly poor due to medical noncompliance and advanced lung disease.  Palliative care consultation recommended.    Further recommendation depend upon clinical course.  Per chart review patient is a DNI/DNR.    Risk Assessment: High  DVT Prophylaxis: Apixaban  Code Status: DNR  Diet: Cardiac  once awake    Advance Care Planning   ACP discussion was held with the patient during this visit. Patient does not have an advance directive, declines further assistance.           Lucila Juan DO  06/07/25  05:40 EDT    Dictated utilizing Dragon dictation.

## 2025-06-07 NOTE — PROGRESS NOTES
[facility]    [facility]    Name:  Gabi Dudley   Age:  80 y.o.  Sex:  female  :  1945  MRN:  [id number]   Visit Number:  [id number]  Admission Date:  2025  Date Of Service:  25  Primary Care Physician:  Krystina Saunders DO     LOS: 0 days :    Chief Complaint:      dyspnea    Subjective:    2025: Admitting provider documentation reviewed. Still confused. Confusion better than yesterday. Breathing stable/ improved.     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  History somewhat limited due to patient condition at this time.  Apparently she lives with daughter, had increasing confusion for the last day or so.  Weakness, more short of breath than typical.  Patient currently arousable, but encephalopathic.  No focal deficit.  She is currently on BiPAP.     In the ER she was on BiPAP, heart rate in the 55 range afebrile blood pressure 130/80.  CBC with a white count of 3800, hemoglobin 9 platelets 188.  CMP creatinine 0.49 with a sodium 146 CO2 of 44.  Glucose of 81.  Lactic acid 0.5.  VBG pH 7.29 with base of 204.  Urinalysis unremarkable.  Chest x-ray appears similar to x-ray from May, no acute opacity.  Head CT reportedly unremarkable.  Procalcitonin, UDS, respiratory panel pending.  Patient given DuoNeb, Solu-Medrol, and placed on BiPAP.  We were asked to admit  Edited by: Milton Urban DO at 2025 1228     Review of Systems:     All systems were reviewed and negative except as mentioned in subjective, assessment and plan.    Vital Signs:    Temp:  [97.6 °F (36.4 °C)-98.7 °F (37.1 °C)] 98.7 °F (37.1  °C)  Heart Rate:  [53-72] 72  Resp:  [16-20] 16  BP: ()/(59-90) 121/59    Intake and output:    No intake/output data recorded.  I/O this shift:  In: 240 [P.O.:240]  Out: -     Physical Examination:    Constitutional: No acute distress, awake, alert  HENT: NCAT, mucous membranes moist  Respiratory: Wheeze and rhonchi bilaterally, respiratory effort increased  Cardiovascular: Heart slow but regular, no murmurs, rubs, or gallops  Gastrointestinal: Positive bowel sounds, soft, nontender, nondistended  Musculoskeletal: No bilateral ankle edema, diffuse weakness  Psychiatric: Appropriate affect, cooperative  Neurologic: Disoriented, speech clear  Skin: No rashes  Edited by: Milton Urban DO at 6/7/2025 0805     Laboratory results:    Results from last 7 days   Lab Units 06/07/25  0314   SODIUM mmol/L 146*   POTASSIUM mmol/L 4.2   CHLORIDE mmol/L 97*   CO2 mmol/L 44.0*   BUN mg/dL 22.0   CREATININE mg/dL 0.49*   CALCIUM mg/dL 9.5   BILIRUBIN mg/dL 0.2   ALK PHOS U/L 86   ALT (SGPT) U/L 34*   AST (SGOT) U/L 34*   GLUCOSE mg/dL 81     Results from last 7 days   Lab Units 06/07/25  0314   WBC 10*3/mm3 3.81   HEMOGLOBIN g/dL 9.1*   HEMATOCRIT % 32.7*   PLATELETS 10*3/mm3 188                 Recent Labs     05/03/25  1613 05/12/25  0558 05/15/25  0651   PHART 7.350 7.410 7.424   VUG5RHK 85.4* 74.5* 62.2*   PO2ART 74.2* 57.6* 164.0*   KOX6SNR 47.1* 47.2* 40.7*   BASEEXCESS 18.4* 19.7* 14.0*      I have reviewed the patient's laboratory results.    Radiology results:    XR Chest 1 View  Result Date: 6/7/2025  CHEST SINGLE VIEW  COMPARISON: Chest from 05/12/2025.  HISTORY: Mental status change.  FINDINGS: Cardiac silhouette is mildly enlarged.  Film was obtained with lordotic positioning. Mild coarse interstitial opacity is seen in both lungs, likely chronic.      Impression: Coarse, chronic-appearing interstitial opacity in both lungs.   This report was signed and finalized on 6/7/2025 9:16 AM by Moustapha Lambert MD.       CT Head Without Contrast  Result Date: 6/7/2025  FINAL REPORT TECHNIQUE: null CLINICAL HISTORY: Encephalopathy, AMS, SOA COMPARISON: null FINDINGS: CT head without contrast Comparison: 05/12/2025. Findings: Unenhanced CT survey of head performed in axial plane. Coronal and sagittal reconstruction images obtained. No intra-or extra-axial hemorrhage, deep white matter edema, or mass effect including midline shift. Generalized atrophy. Scattered bilateral white matter lucencies particularly periventricular nonspecific but most likely related to chronic ischemic changes. Atherosclerosis. Mild mucosal thickening right side sphenoid sinus decreased. Minimal mucosal thickening bilateral ethmoid air cells decreased. Mastoid air cells are essentially clear. No fracture seen.     Impression: Impression: 1. Negative for acute intracranial CT abnormality. 2. Mild mucosal thickening paranasal sinuses decreased. Authenticated and Electronically Signed by Rex Severino MD on 06/07/2025 06:29:50 AM    I have reviewed the patient's radiology reports.    Medication Review:     I have reviewed the patient's active and prn medications.     Problem List:      Acute on chronic respiratory failure with hypoxia and hypercapnia      Assessment/Plan:    COPD exacerbation  Acute on chronic hypoxic/hypercapnic respiratory failure  Acute metabolic encephalopathy likely secondary to #2  History of paroxysmal atrial fibrillation/flutter  History of diastolic/systolic CHF  Anxiety depression  History of medical noncompliance     Plan:     Respiratory failure/COPD/encephalopathy:  CO2 narcosis improved with bipap steroids breathing treatment. Patient on sedating medications, in setting of end-stage COPD and advanced hypercapnic respiratory failure with history of noncompliance with AVAPS. Must spend time every day. Bipap. Add seroquel.     Atrial fibrillation/flutter/combined CHF:  Chronically elevated not far from baseline proBNP.  Volume  status appears fair on exam.  Continue with oral diuretic.  Continue with amiodarone and Eliquis.  Telemetry monitoring.     Impaired mobility and ADLs/anxiety/depression:  Overall prognosis fairly poor due to medical noncompliance and advanced lung disease.  Palliative care consultation recommended.     DVT Prophylaxis: Apixaban  Code Status: DNR/DNI  Diet: Cardiac once awake  Disposition: PT/OT/palliative to see anticipate greater than 48 hours admission  Edited by: Milton Urban DO at 6/7/2025 1232           Milton Urban DO  06/07/25  12:33 EDT    Dictated utilizing Dragon dictation.

## 2025-06-07 NOTE — ED PROVIDER NOTES
EMERGENCY DEPARTMENT ENCOUNTER    Pt Name: Gabi Dudley  MRN: [id number]  Pt :   1945  Room Number:  10/10  Date of encounter:  2025  PCP: Krystina Saunders DO  ED Provider: Maximo De Oliveira MD    Historian: Daughter      HPI:  Chief Complaint: Confusion        Context: Gabi Dudley is a 80 y.o. female who presents to the ED c/o confusion.  Patient has a past medical history significant for COPD, hypertension, paroxysmal atrial fibrillation, GERD, anxiety, heart disease, and chronic anemia. Daughter says the patient has been more confused throughout the day.  Patient arrives to the Emergency Department by EMS.  At time of my evaluation patient is confused and is unable to provide reliable history.      PAST MEDICAL HISTORY  Past Medical History:   Diagnosis Date    Adrenal adenoma     Anemia     Arrhythmia     Asthma     Atrial fibrillation     Back pain     Benign colonic polyp     Benign tumor of adrenal gland     Cataract     bilateral    Cholelithiasis     Chronic bronchitis     Chronic bronchitis with COPD (chronic obstructive pulmonary disease)     COPD (chronic obstructive pulmonary disease)     Coronary artery disease     Depression     Diverticulosis Years ago    Elevated cholesterol     Environmental and seasonal allergies     Fibromyalgia     Fibromyalgia, primary Sometime in the     Gastritis     Generalized anxiety disorder     GERD (gastroesophageal reflux disease)     H/O mammogram     Headache Years ago    Hemorrhoids     History of blood transfusion     History of blood transfusion     History of echocardiogram     History of endometriosis     History of nuclear stress test     Hospitalization or health care facility admission within last 6 months     5 times between 2022-2023    Hypertension     IBS (irritable bowel syndrome)     Impaired functional mobility, balance, gait, and endurance     Impaired mobility     Inverted nipple     Kidney disease     Kidney  stone     Liver cyst     Low back pain Years ago    Nodular radiologic density     Nodule of left lung     NSTEMI (non-ST elevated myocardial infarction) 9/24/2024    On home oxygen therapy     2 liters NC QHS    Osteoarthritis     Osteopenia Several years ago    Osteoporosis     PONV (postoperative nausea and vomiting)     Renal cyst     Sinus problem     2014    Sinusitis     Skin cancer     basal cell carcinoma    SOB (shortness of breath)     Tobacco use     Urinary frequency     Urinary tract infection Years ago    Frequent UTIs    Vitamin D deficiency     Wears glasses     Wears partial dentures     upper plate         PAST SURGICAL HISTORY  Past Surgical History:   Procedure Laterality Date    APPENDECTOMY  1980s    CARDIAC CATHETERIZATION  2003    CATARACT EXTRACTION  2013    both eyes    CHOLECYSTECTOMY  2020    CHOLECYSTECTOMY WITH INTRAOPERATIVE CHOLANGIOGRAM N/A 10/30/2020    Procedure: CHOLECYSTECTOMY LAPAROSCOPIC INTRAOPERATIVE CHOLANGIOGRAPHY;  Surgeon: Sima Moses MD;  Location: [facility] OR;  Service: General;  Laterality: N/A;    COLON SURGERY  2020    COLONOSCOPY  03/11/2013    COLONOSCOPY  06/21/2016    COLONOSCOPY N/A 07/24/2019    Procedure: COLONOSCOPY W/ COLD FORCEP POLYPECTOMIES; HOT SNARE POLYPECTOMIES; COLD SNARE POLYPECTOMY;  Surgeon: Goyo Nunez MD;  Location: [facility] ENDOSCOPY;  Service: Gastroenterology    COLONOSCOPY W/ BIOPSIES AND POLYPECTOMY      EXPLORATORY LAPAROTOMY N/A 11/23/2020    Procedure: colectomy, right, closure of enterotomy x 2, reduction of internal volvulus;  Surgeon: Sima Moses MD;  Location: [facility] OR;  Service: General;  Laterality: N/A;    EXPLORATORY LAPAROTOMY N/A 8/9/2023    Procedure: LAPAROTOMY EXPLORATORY WITH LYIS OF ADHESIONS AND  CENTRAL LINE INSERTION;  Surgeon: Bianca Isaac DO;  Location: [facility] OR;  Service: General;  Laterality: N/A;    HYSTERECTOMY  1980s    partial    LYSIS OF ABDOMINAL ADHESIONS      TONSILLECTOMY  1987    UPPER  GASTROINTESTINAL ENDOSCOPY  2015    VAGINAL DELIVERY      x2         FAMILY HISTORY  Family History   Problem Relation Age of Onset    Stomach cancer Brother     Colon cancer Maternal Aunt     Brain cancer Father     Arthritis Father     Hypertension Father     Cancer Father         Father, brother, and aunt    Lung cancer Paternal Grandfather     Heart disease Mother         Mother passed away due to heart disease    Breast cancer Neg Hx     Ovarian cancer Neg Hx          SOCIAL HISTORY  Social History     Socioeconomic History    Marital status:    Tobacco Use    Smoking status: Former     Current packs/day: 0.00     Average packs/day: 0.3 packs/day for 30.0 years (7.5 ttl pk-yrs)     Types: Cigarettes     Start date: 1990     Quit date: 2020     Years since quittin.6     Passive exposure: Past    Smokeless tobacco: Never   Vaping Use    Vaping status: Never Used   Substance and Sexual Activity    Alcohol use: No    Drug use: No    Sexual activity: Not Currently     Birth control/protection: Post-menopausal         ALLERGIES  Ativan [lorazepam] and Doxycycline        REVIEW OF SYSTEMS    All systems reviewed and negative except for those discussed in HPI.       PHYSICAL EXAM    I have reviewed the triage vital signs and nursing notes.    ED Triage Vitals [25 0328]   Temp Heart Rate Resp BP SpO2   -- 54 18 129/68 98 %      Temp src Heart Rate Source Patient Position BP Location FiO2 (%)   -- Monitor Sitting Left arm --         General: Moderate acute distress  Skin: normal color, warm and dry  Head: normocephalic, atraumatic  Nose: normal nasal mucosa, no visible deformity.  Mouth: moist mucous membranes.  Neck: supple.  Chest: no retractions, no visible deformity  Cardiovascular: Regular rate and rhythm.  Lungs: Decreased bilateral breath sounds.  Abdomen: soft, non-tender, non-distended. No rebound tenderness, no guarding.  Extremities: no cyanosis or edema. Palpable radial  pulses bilaterally.   Neuro:  alert, opens eyes spontaneously, follows commands.  Moves extremity spontaneously.  Confused speech.      LAB RESULTS  Recent Results (from the past 24 hours)   Comprehensive Metabolic Panel    Collection Time: 06/07/25  3:14 AM    Specimen: Blood   Result Value Ref Range    Glucose 81 65 - 99 mg/dL    BUN 22.0 8.0 - 23.0 mg/dL    Creatinine 0.49 (L) 0.57 - 1.00 mg/dL    Sodium 146 (H) 136 - 145 mmol/L    Potassium 4.2 3.5 - 5.2 mmol/L    Chloride 97 (L) 98 - 107 mmol/L    CO2 44.0 (H) 22.0 - 29.0 mmol/L    Calcium 9.5 8.6 - 10.5 mg/dL    Total Protein 5.9 (L) 6.0 - 8.5 g/dL    Albumin 3.2 (L) 3.5 - 5.2 g/dL    ALT (SGPT) 34 (H) 1 - 33 U/L    AST (SGOT) 34 (H) 1 - 32 U/L    Alkaline Phosphatase 86 39 - 117 U/L    Total Bilirubin 0.2 0.0 - 1.2 mg/dL    Globulin 2.7 gm/dL    A/G Ratio 1.2 g/dL    BUN/Creatinine Ratio 44.9 (H) 7.0 - 25.0    Anion Gap 5.0 5.0 - 15.0 mmol/L    eGFR 96.6 >60.0 mL/min/1.73   Green Top (Gel)    Collection Time: 06/07/25  3:14 AM   Result Value Ref Range    Extra Tube Hold for add-ons.    Lavender Top    Collection Time: 06/07/25  3:14 AM   Result Value Ref Range    Extra Tube hold for add-on    Gold Top - SST    Collection Time: 06/07/25  3:14 AM   Result Value Ref Range    Extra Tube Hold for add-ons.    Light Blue Top    Collection Time: 06/07/25  3:14 AM   Result Value Ref Range    Extra Tube Hold for add-ons.    CBC Auto Differential    Collection Time: 06/07/25  3:14 AM    Specimen: Blood   Result Value Ref Range    WBC 3.81 3.40 - 10.80 10*3/mm3    RBC 3.45 (L) 3.77 - 5.28 10*6/mm3    Hemoglobin 9.1 (L) 12.0 - 15.9 g/dL    Hematocrit 32.7 (L) 34.0 - 46.6 %    MCV 94.8 79.0 - 97.0 fL    MCH 26.4 (L) 26.6 - 33.0 pg    MCHC 27.8 (L) 31.5 - 35.7 g/dL    RDW 15.2 12.3 - 15.4 %    RDW-SD 52.5 37.0 - 54.0 fl    MPV 10.2 6.0 - 12.0 fL    Platelets 188 140 - 450 10*3/mm3    Neutrophil % 66.7 42.7 - 76.0 %    Lymphocyte % 24.1 19.6 - 45.3 %    Monocyte % 7.6 5.0 -  12.0 %    Eosinophil % 0.8 0.3 - 6.2 %    Basophil % 0.3 0.0 - 1.5 %    Immature Grans % 0.5 0.0 - 0.5 %    Neutrophils, Absolute 2.54 1.70 - 7.00 10*3/mm3    Lymphocytes, Absolute 0.92 0.70 - 3.10 10*3/mm3    Monocytes, Absolute 0.29 0.10 - 0.90 10*3/mm3    Eosinophils, Absolute 0.03 0.00 - 0.40 10*3/mm3    Basophils, Absolute 0.01 0.00 - 0.20 10*3/mm3    Immature Grans, Absolute 0.02 0.00 - 0.05 10*3/mm3    nRBC 0.0 0.0 - 0.2 /100 WBC   Scan Slide    Collection Time: 06/07/25  3:14 AM    Specimen: Blood   Result Value Ref Range    Hypochromia Slight/1+ None Seen    WBC Morphology Normal Normal    Platelet Estimate Adequate Normal   Lactic Acid, Plasma    Collection Time: 06/07/25  3:15 AM    Specimen: Blood   Result Value Ref Range    Lactate 0.5 0.5 - 2.0 mmol/L   POC Glucose Once    Collection Time: 06/07/25  3:37 AM    Specimen: Blood   Result Value Ref Range    Glucose 90 70 - 130 mg/dL   Blood Gas, Venous With Co-Ox    Collection Time: 06/07/25  3:56 AM    Specimen: Venous Blood   Result Value Ref Range    Site OTHER     pH, Venous 7.291 (L) 7.320 - 7.420 pH Units    pCO2, Venous 104.0 (H) 40.0 - 50.0 mm Hg    pO2, Venous 26.6 (L) 30.0 - 50.0 mm Hg    HCO3, Venous 49.9 (H) 22.0 - 28.0 mmol/L    Base Excess, Venous 20.1 (H) 0.0 - 2.0 mmol/L    O2 Saturation, Venous 42.5 (L) 45.0 - 75.0 %    Oxyhemoglobin Venous 41.6 40.0 - 70.0 %    Methemoglobin Venous 0.8 0.0 - 3.0 %    Carboxyhemoglobin Venous 1.3 0.0 - 5.0 %    Barometric Pressure for Blood Gas 729 mmHg    Modality Nasal Cannula     Flow Rate 2.0 lpm    Ventilator Mode NA     Notified Who MD     Notified By [id number]     Notified Time 06/07/2025 03:57     Collected by [id number]    Urinalysis With Microscopic If Indicated (No Culture) - Urine, Catheter    Collection Time: 06/07/25  4:02 AM    Specimen: Urine, Catheter   Result Value Ref Range    Color, UA Yellow Yellow, Straw    Appearance, UA Clear Clear    pH, UA 6.0 5.0 - 8.0    Specific Gravity, UA 1.019  1.005 - 1.030    Glucose, UA Negative Negative    Ketones, UA Negative Negative    Bilirubin, UA Negative Negative    Blood, UA Negative Negative    Protein, UA Negative Negative    Leuk Esterase, UA Negative Negative    Nitrite, UA Negative Negative    Urobilinogen, UA 1.0 E.U./dL 0.2 - 1.0 E.U./dL       If labs were ordered, I independently reviewed the results and considered them in treating the patient.  See medical decision making discussion section for my interpretation of lab results.        RADIOLOGY  No Radiology Exams Resulted Within Past 24 Hours    I ordered and independently reviewed the above noted radiographic studies.  See radiologist's dictation for official interpretation.    Per my independent reading:      Chest radiograph is obtained and is negative for acute cardiopulmonary findings based on my independent reading.    CT head without based on my independent initial review negative for evidence of hemorrhage, mass effect or midline shift.      PROCEDURES    Procedures    ECG 12 Lead Dyspnea   Final Result          MEDICATIONS GIVEN IN ER    Medications   sodium chloride 0.9 % flush 10 mL (has no administration in time range)   amiodarone (PACERONE) tablet 200 mg (has no administration in time range)   apixaban (ELIQUIS) tablet 5 mg (has no administration in time range)   atorvastatin (LIPITOR) tablet 40 mg (has no administration in time range)   benzonatate (TESSALON) capsule 100 mg (has no administration in time range)   arformoterol (BROVANA) nebulizer solution 15 mcg (has no administration in time range)     And   budesonide (PULMICORT) nebulizer solution 0.5 mg (has no administration in time range)     And   revefenacin (YUPELRI) nebulizer solution 175 mcg (has no administration in time range)   Diclofenac Sodium (VOLTAREN) 1 % gel 4 g (has no administration in time range)   DULoxetine (CYMBALTA) DR capsule 60 mg (has no administration in time range)   furosemide (LASIX) tablet 20 mg (has no  administration in time range)   ipratropium-albuterol (DUO-NEB) nebulizer solution 3 mL (has no administration in time range)   losartan (COZAAR) tablet 25 mg (has no administration in time range)   metoprolol succinate XL (TOPROL-XL) 24 hr tablet 50 mg (has no administration in time range)   pantoprazole (PROTONIX) EC tablet 40 mg (has no administration in time range)   sertraline (ZOLOFT) tablet 100 mg (has no administration in time range)   sodium chloride 0.9 % flush 10 mL (has no administration in time range)   sodium chloride 0.9 % flush 10 mL (has no administration in time range)   sodium chloride 0.9 % infusion 40 mL (has no administration in time range)   nitroglycerin (NITROSTAT) SL tablet 0.4 mg (has no administration in time range)   methylPREDNISolone sodium succinate (SOLU-Medrol) injection 40 mg (has no administration in time range)   acetaminophen (TYLENOL) tablet 650 mg (has no administration in time range)     Or   acetaminophen (TYLENOL) 160 MG/5ML oral solution 650 mg (has no administration in time range)     Or   acetaminophen (TYLENOL) suppository 650 mg (has no administration in time range)   sennosides-docusate (PERICOLACE) 8.6-50 MG per tablet 2 tablet (has no administration in time range)     And   polyethylene glycol (MIRALAX) packet 17 g (has no administration in time range)     And   bisacodyl (DULCOLAX) EC tablet 5 mg (has no administration in time range)     And   bisacodyl (DULCOLAX) suppository 10 mg (has no administration in time range)   ondansetron ODT (ZOFRAN-ODT) disintegrating tablet 4 mg (has no administration in time range)     Or   ondansetron (ZOFRAN) injection 4 mg (has no administration in time range)   aluminum-magnesium hydroxide-simethicone (MAALOX MAX) 400-400-40 MG/5ML suspension 15 mL (has no administration in time range)   methylPREDNISolone sodium succinate (SOLU-Medrol) injection 125 mg (125 mg Intravenous Given 6/7/25 7637)   ipratropium-albuterol (DUO-NEB)  nebulizer solution 3 mL (3 mL Nebulization Given 6/7/25 1764)   ipratropium-albuterol (DUO-NEB) nebulizer solution 6 mL (6 mL Nebulization Given 6/7/25 3890)         MEDICAL DECISION MAKING, PROGRESS, and CONSULTS    All labs, if obtained, have been independently reviewed by me.  All radiology studies, if obtained, have been reviewed by me and the radiologist dictating the report.  All EKG's, if obtained, have been independently viewed and interpreted by me/my attending physician.      Discussion below represents my analysis of pertinent findings related to patient's condition, differential diagnosis, treatment plan and final disposition.                         Differential diagnosis:    Differential includes COPD exacerbation with hypercarbia, UTI, pneumonia, acute coronary syndrome, hypoglycemia, other acute emergency.    Medical Decision Making Discussion:    Vitals reviewed and demonstrate bradycardia but otherwise are normal.    EKG obtained and based on my independent review demonstrates sinus bradycardia.  No acute ischemic changes.  No AV block.  Normal AL, QRS intervals.  Borderline prolonged QT interval which is likely rate related.    Venous blood gas obtained which shows respiratory acidosis.    Clinically, patient likely has hypercarbic encephalopathy.  Patient to be given IV Solu-Medrol and 3 back-to-back DuoNeb's.  Patient placed on BiPAP.    Case discussed with Dr. Juan who accepted the patient for hospitalization.    50 minutes of critical care provided. This time excludes other billable procedures. Time does include preparation of documents, medical consultations, review of old records, and direct bedside care. Patient is at high risk for life-threatening deterioration due to metabolic encephalopathy, hypercarbic respiratory failure.      Additional sources:    - Discussed/ obtained information from independent historians: Daughter    - External (non-ED) record review: History physical from May  2025 documenting history of COPD, hypertension, paroxysmal atrial fibrillation, GERD, anxiety, heart disease, chronic anemia.  Patient hospitalized with acute on chronic hypercarbic respiratory failure.    - Chronic or social conditions impacting care: COPD, hypertension, atrial fibrillation, heart disease    Shared Decision Making:  After my consideration of clinical presentation and any laboratory/radiology studies obtained, I discussed the findings with the patient/patient representative who is in agreement with the treatment plan and the final disposition.   Risks and benefits of discharge and/or observation/admission were discussed.    Orders placed during this visit:  Orders Placed This Encounter   Procedures    Respiratory Panel PCR w/COVID-19(SARS-CoV-2) SAMMIE/NAMITA/GREGORIO/PAD/COR/KRYSTAL In-House, NP Swab in UTM/VTM, 2 HR TAT - Swab, Nasopharynx    XR Chest 1 View    CT Head Without Contrast    [place] Draw    Comprehensive Metabolic Panel    Urinalysis With Microscopic If Indicated (No Culture) - Urine, Catheter    CBC Auto Differential    Scan Slide    Blood Gas, Venous -With Co-Ox Panel: Yes    Blood Gas, Venous With Co-Ox    Procalcitonin    Lactic Acid, Plasma    Urine Drug Screen - Urine, Clean Catch    BNP    Fentanyl, Urine - Urine, Clean Catch    Diet: Cardiac; Healthy Heart (2-3 Na+); Fluid Consistency: Thin (IDDSI 0)    Continuous Pulse Oximetry    Vital Signs    Straight Cath    Reason for COPD Admission: Noncompliance; Indicate COPD Diagnosis For Problem List: Acute on chronic respiratory failure with hypoxia and hypercapnia    Tobacco Cessation Education    Respiratory Treatment Education (MDI / Spacer / Nebulizer)    COPD Education    Cough / Deep Breathe    Vital Signs    Intake & Output    Weigh Patient    Oral Care    Place Sequential Compression Device    Maintain Sequential Compression Device    Maintain IV Access    Telemetry - Place Orders & Notify Provider of Results When Patient Experiences  Acute Chest Pain, Dysrhythmia or Respiratory Distress    May Be Off Telemetry for Tests    Code Status and Medical Interventions: No CPR (Do Not Attempt to Resuscitate); Limited Support; No intubation (DNI)    Inpatient Palliative Care Team Consult    OT Consult: Eval & Treat Discharge Placement Assessment    PT Consult: Eval & Treat Functional Mobility Below Baseline    Oxygen Therapy- Nasal Cannula; Titrate 1-6 LPM Per SpO2; 90 - 95%    NIPPV (CPAP or BIPAP)    Document Pulse Oximetry - On Room Air / Home O2 Level    Oscillating Positive Expiratory Pressure (OPEP)    POC Glucose Once    POC Glucose Once    ECG 12 Lead Dyspnea    Insert Peripheral IV    Insert Peripheral IV    Inpatient Admission    Fall Precautions    CBC & Differential    Green Top (Gel)    Lavender Top    Gold Top - SST    Light Blue Top         AS OF 06:09 EDT VITALS:    BP - 130/90  HR - 53  TEMP - 97.6 °F (36.4 °C) (Oral)  O2 SATS - 99%                  DIAGNOSIS  Final diagnoses:   COPD exacerbation   Metabolic encephalopathy         DISPOSITION  Admit      Please note that portions of this document were completed with voice recognition software.        Maximo De Oliveira MD  06/07/25 0639

## 2025-06-07 NOTE — ED NOTES
Pt left ER in stable condition, alert and active. Pt left via stretcher to the third floor accompanied by ER tech. Pt and family had no further questions following admission details.

## 2025-06-07 NOTE — PLAN OF CARE
Goal Outcome Evaluation:           Problem: Adult Inpatient Plan of Care  Goal: Plan of Care Review  Outcome: Progressing         Pt resting during shift today; she is working to breathe when not on bipap, more confused early afternoon, placed on bipap.  Pt wore bipap for maybe 2 hours and then wanted back to 4L NC which is baseline.  Pt is in and out of afib to SB rate of 58.  Pt is very weak and lethargic but does arouse and follows commands.  Daughter at bedside this morning.  Pt encouraged to turn every 2 hours, pt up to BSC with assistance.

## 2025-06-08 LAB
ANION GAP SERPL CALCULATED.3IONS-SCNC: 10.7 MMOL/L (ref 5–15)
ATMOSPHERIC PRESS: 726 MMHG
BASE EXCESS BLDV CALC-SCNC: 15.6 MMOL/L (ref 0–2)
BDY SITE: ABNORMAL
BUN SERPL-MCNC: 25 MG/DL (ref 8–23)
BUN/CREAT SERPL: 44.6 (ref 7–25)
CALCIUM SPEC-SCNC: 9.5 MG/DL (ref 8.6–10.5)
CHLORIDE SERPL-SCNC: 98 MMOL/L (ref 98–107)
CO2 SERPL-SCNC: 36.3 MMOL/L (ref 22–29)
COHGB MFR BLD: 1.1 % (ref 0–5)
CREAT SERPL-MCNC: 0.56 MG/DL (ref 0.57–1)
DEPRECATED RDW RBC AUTO: 49.2 FL (ref 37–54)
EGFRCR SERPLBLD CKD-EPI 2021: 93.5 ML/MIN/1.73
ERYTHROCYTE [DISTWIDTH] IN BLOOD BY AUTOMATED COUNT: 15 % (ref 12.3–15.4)
GAS FLOW AIRWAY: 4 LPM
GLUCOSE SERPL-MCNC: 101 MG/DL (ref 65–99)
HCO3 BLDV-SCNC: 43.1 MMOL/L (ref 22–28)
HCT VFR BLD AUTO: 28.9 % (ref 34–46.6)
HGB BLD-MCNC: 8.5 G/DL (ref 12–15.9)
Lab: ABNORMAL
MCH RBC QN AUTO: 26.2 PG (ref 26.6–33)
MCHC RBC AUTO-ENTMCNC: 29.4 G/DL (ref 31.5–35.7)
MCV RBC AUTO: 88.9 FL (ref 79–97)
METHGB BLD QL: 0.4 % (ref 0–3)
MODALITY: ABNORMAL
OXYHGB MFR BLDV: 76.7 % (ref 40–70)
PCO2 BLDV: 73.8 MM HG (ref 40–50)
PH BLDV: 7.38 PH UNITS (ref 7.32–7.42)
PLATELET # BLD AUTO: 228 10*3/MM3 (ref 140–450)
PMV BLD AUTO: 10.6 FL (ref 6–12)
PO2 BLDV: 43.1 MM HG (ref 30–50)
POTASSIUM SERPL-SCNC: 3.9 MMOL/L (ref 3.5–5.2)
RBC # BLD AUTO: 3.25 10*6/MM3 (ref 3.77–5.28)
SAO2 % BLDCOV: 77.9 % (ref 45–75)
SODIUM SERPL-SCNC: 145 MMOL/L (ref 136–145)
VENTILATOR MODE: ABNORMAL
WBC NRBC COR # BLD AUTO: 6.84 10*3/MM3 (ref 3.4–10.8)

## 2025-06-08 PROCEDURE — 94660 CPAP INITIATION&MGMT: CPT

## 2025-06-08 PROCEDURE — 82805 BLOOD GASES W/O2 SATURATION: CPT

## 2025-06-08 PROCEDURE — 82820 HEMOGLOBIN-OXYGEN AFFINITY: CPT

## 2025-06-08 PROCEDURE — 99232 SBSQ HOSP IP/OBS MODERATE 35: CPT | Performed by: INTERNAL MEDICINE

## 2025-06-08 PROCEDURE — 63710000001 REVEFENACIN 175 MCG/3ML SOLUTION: Performed by: FAMILY MEDICINE

## 2025-06-08 PROCEDURE — 97162 PT EVAL MOD COMPLEX 30 MIN: CPT

## 2025-06-08 PROCEDURE — 25010000002 METHYLPREDNISOLONE PER 40 MG: Performed by: FAMILY MEDICINE

## 2025-06-08 PROCEDURE — 94761 N-INVAS EAR/PLS OXIMETRY MLT: CPT

## 2025-06-08 PROCEDURE — 94664 DEMO&/EVAL PT USE INHALER: CPT

## 2025-06-08 PROCEDURE — 80048 BASIC METABOLIC PNL TOTAL CA: CPT | Performed by: INTERNAL MEDICINE

## 2025-06-08 PROCEDURE — 94799 UNLISTED PULMONARY SVC/PX: CPT

## 2025-06-08 PROCEDURE — 85027 COMPLETE CBC AUTOMATED: CPT | Performed by: INTERNAL MEDICINE

## 2025-06-08 RX ADMIN — QUETIAPINE FUMARATE 25 MG: 25 TABLET ORAL at 20:34

## 2025-06-08 RX ADMIN — SERTRALINE 100 MG: 50 TABLET, FILM COATED ORAL at 09:06

## 2025-06-08 RX ADMIN — LOSARTAN POTASSIUM 25 MG: 25 TABLET, FILM COATED ORAL at 09:06

## 2025-06-08 RX ADMIN — ATORVASTATIN CALCIUM 40 MG: 40 TABLET, FILM COATED ORAL at 09:13

## 2025-06-08 RX ADMIN — ARFORMOTEROL TARTRATE 15 MCG: 15 SOLUTION RESPIRATORY (INHALATION) at 09:49

## 2025-06-08 RX ADMIN — FUROSEMIDE 20 MG: 20 TABLET ORAL at 09:06

## 2025-06-08 RX ADMIN — DULOXETINE 60 MG: 30 CAPSULE, DELAYED RELEASE ORAL at 20:34

## 2025-06-08 RX ADMIN — DULOXETINE 60 MG: 30 CAPSULE, DELAYED RELEASE ORAL at 09:06

## 2025-06-08 RX ADMIN — METOPROLOL SUCCINATE 50 MG: 50 TABLET, EXTENDED RELEASE ORAL at 09:06

## 2025-06-08 RX ADMIN — APIXABAN 5 MG: 5 TABLET, FILM COATED ORAL at 20:34

## 2025-06-08 RX ADMIN — METHYLPREDNISOLONE SODIUM SUCCINATE 40 MG: 40 INJECTION INTRAMUSCULAR; INTRAVENOUS at 17:23

## 2025-06-08 RX ADMIN — AMIODARONE HYDROCHLORIDE 200 MG: 200 TABLET ORAL at 09:06

## 2025-06-08 RX ADMIN — SENNOSIDES, DOCUSATE SODIUM 2 TABLET: 50; 8.6 TABLET, FILM COATED ORAL at 09:06

## 2025-06-08 RX ADMIN — PANTOPRAZOLE SODIUM 40 MG: 40 TABLET, DELAYED RELEASE ORAL at 09:06

## 2025-06-08 RX ADMIN — ACETAMINOPHEN 650 MG: 325 TABLET, FILM COATED ORAL at 09:12

## 2025-06-08 RX ADMIN — ARFORMOTEROL TARTRATE 15 MCG: 15 SOLUTION RESPIRATORY (INHALATION) at 19:33

## 2025-06-08 RX ADMIN — APIXABAN 5 MG: 5 TABLET, FILM COATED ORAL at 09:06

## 2025-06-08 RX ADMIN — BUDESONIDE 0.5 MG: 0.5 SUSPENSION RESPIRATORY (INHALATION) at 19:33

## 2025-06-08 RX ADMIN — REVEFENACIN 175 MCG: 175 SOLUTION RESPIRATORY (INHALATION) at 09:49

## 2025-06-08 RX ADMIN — Medication 10 ML: at 20:35

## 2025-06-08 RX ADMIN — BUDESONIDE 0.5 MG: 0.5 SUSPENSION RESPIRATORY (INHALATION) at 09:49

## 2025-06-08 RX ADMIN — METHYLPREDNISOLONE SODIUM SUCCINATE 40 MG: 40 INJECTION INTRAMUSCULAR; INTRAVENOUS at 03:38

## 2025-06-08 RX ADMIN — Medication 10 ML: at 09:06

## 2025-06-08 NOTE — PROGRESS NOTES
[facility]    Name:  Gabi Dudley   Age:  80 y.o.  Sex:  female  :  1945  MRN:  [id number]   Visit Number:  [id number]  Admission Date:  2025  Date Of Service:  25  Primary Care Physician:  Krystina Saunders DO     LOS: 1 day :    Chief Complaint:      weakness    Subjective:    2025: Still pleasantly confused more alert. Wore bipap overnight. VBG pending. Worked with therapy. Called daughter Farida. No answer.    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  History somewhat limited due to patient condition at this time.  Apparently she lives with daughter, had increasing confusion for the last day or so.  Weakness, more short of breath than typical.  Patient currently arousable, but encephalopathic.  No focal deficit.  She is currently on BiPAP.     In the ER she was on BiPAP, heart rate in the 55 range afebrile blood pressure 130/80.  CBC with a white count of 3800, hemoglobin 9 platelets 188.  CMP creatinine 0.49 with a sodium 146 CO2 of 44.  Glucose of 81.  Lactic acid 0.5.  VBG pH 7.29 with base of 204.  Urinalysis unremarkable.  Chest x-ray appears similar to x-ray from May, no acute opacity.  Head CT reportedly unremarkable.  Procalcitonin, UDS, respiratory panel pending.  Patient given DuoNeb, Solu-Medrol, and placed on BiPAP.  We were asked to admit  Edited by: Milton Urban DO at 2025 1101     Review of Systems:     All systems were reviewed and negative except as mentioned in subjective, assessment and plan.    Vital Signs:    Temp:  [98.2 °F (36.8 °C)-98.9 °F (37.2 °C)] 98.5 °F (36.9  °C)  Heart Rate:  [63-78] 78  Resp:  [16-20] 20  BP: (100-154)/(46-93) 107/93    Intake and output:    I/O last 3 completed shifts:  In: 720 [P.O.:720]  Out: 300 [Urine:300]  I/O this shift:  In: 120 [P.O.:120]  Out: -     Physical Examination:    Constitutional: No acute distress, awake, alert  HENT: NCAT, mucous membranes moist  Respiratory: Wheeze and rhonchi bilaterally, respiratory effort increased  Cardiovascular: Heart slow but regular, no murmurs, rubs, or gallops  Gastrointestinal: Positive bowel sounds, soft, nontender, nondistended  Musculoskeletal: No bilateral ankle edema, diffuse weakness  Psychiatric: Appropriate affect, cooperative  Neurologic: Disoriented, speech clear  Skin: No rashes  Exam stable 6/8/25  Edited by: Milton Urban, DO at 6/8/2025 1101     Laboratory results:    Results from last 7 days   Lab Units 06/08/25  0707 06/07/25  0314   SODIUM mmol/L 145 146*   POTASSIUM mmol/L 3.9 4.2   CHLORIDE mmol/L 98 97*   CO2 mmol/L 36.3* 44.0*   BUN mg/dL 25.0* 22.0   CREATININE mg/dL 0.56* 0.49*   CALCIUM mg/dL 9.5 9.5   BILIRUBIN mg/dL  --  0.2   ALK PHOS U/L  --  86   ALT (SGPT) U/L  --  34*   AST (SGOT) U/L  --  34*   GLUCOSE mg/dL 101* 81     Results from last 7 days   Lab Units 06/08/25  0707 06/07/25  0314   WBC 10*3/mm3 6.84 3.81   HEMOGLOBIN g/dL 8.5* 9.1*   HEMATOCRIT % 28.9* 32.7*   PLATELETS 10*3/mm3 228 188                 Recent Labs     05/03/25  1613 05/12/25  0558 05/15/25  0651   PHART 7.350 7.410 7.424   TWN3VZO 85.4* 74.5* 62.2*   PO2ART 74.2* 57.6* 164.0*   HQU8YJQ 47.1* 47.2* 40.7*   BASEEXCESS 18.4* 19.7* 14.0*      I have reviewed the patient's laboratory results.    Radiology results:    XR Chest 1 View  Result Date: 6/7/2025  CHEST SINGLE VIEW  COMPARISON: Chest from 05/12/2025.  HISTORY: Mental status change.  FINDINGS: Cardiac silhouette is mildly enlarged.  Film was obtained with lordotic positioning. Mild coarse interstitial opacity is seen in both lungs, likely  chronic.      Impression: Coarse, chronic-appearing interstitial opacity in both lungs.   This report was signed and finalized on 6/7/2025 9:16 AM by Moustapha Lambert MD.      CT Head Without Contrast  Result Date: 6/7/2025  FINAL REPORT TECHNIQUE: null CLINICAL HISTORY: Encephalopathy, AMS, SOA COMPARISON: null FINDINGS: CT head without contrast Comparison: 05/12/2025. Findings: Unenhanced CT survey of head performed in axial plane. Coronal and sagittal reconstruction images obtained. No intra-or extra-axial hemorrhage, deep white matter edema, or mass effect including midline shift. Generalized atrophy. Scattered bilateral white matter lucencies particularly periventricular nonspecific but most likely related to chronic ischemic changes. Atherosclerosis. Mild mucosal thickening right side sphenoid sinus decreased. Minimal mucosal thickening bilateral ethmoid air cells decreased. Mastoid air cells are essentially clear. No fracture seen.     Impression: Impression: 1. Negative for acute intracranial CT abnormality. 2. Mild mucosal thickening paranasal sinuses decreased. Authenticated and Electronically Signed by Rex Severino MD on 06/07/2025 06:29:50 AM    I have reviewed the patient's radiology reports.    Medication Review:     I have reviewed the patient's active and prn medications.     Problem List:      Acute on chronic respiratory failure with hypoxia and hypercapnia      Assessment/Plan:    COPD exacerbation  Acute on chronic hypoxic/hypercapnic respiratory failure  Acute metabolic encephalopathy likely secondary to #2  History of paroxysmal atrial fibrillation/flutter  History of diastolic/systolic CHF  Anxiety depression  History of medical noncompliance     Plan:     Respiratory failure/COPD/encephalopathy:  CO2 narcosis improved with bipap steroids breathing treatment. Patient on sedating medications, in setting of end-stage COPD and advanced hypercapnic respiratory failure with history of noncompliance  with AVAPS. Must spend time every day. Bipap. Add seroquel. Repeat VBG ordered.     Atrial fibrillation/flutter/combined CHF:  Chronically elevated not far from baseline proBNP.  Volume status appears euvolemic on exam.  Continue with oral diuretic.  Continue with amiodarone and Eliquis.  Telemetry monitoring.     Impaired mobility and ADLs/anxiety/depression:  Overall prognosis fairly poor due to medical noncompliance and advanced lung disease.  Palliative care consultation recommended.     DVT Prophylaxis: Apixaban  Code Status: DNR/DNI  Diet: Cardiac   Disposition: PT/OT/palliative to see anticipate  STR in 1 to 2 days  Edited by: Milton Urban DO at 6/8/2025 1102           Milton Urban DO  06/08/25  11:02 EDT    Dictated utilizing Dragon dictation.

## 2025-06-08 NOTE — PLAN OF CARE
Goal Outcome Evaluation:                 Problem: Adult Inpatient Plan of Care  Goal: Plan of Care Review  Outcome: Progressing        Pt remains confused but more alert today; pt tried to wear bipap for a short time but was unable to, pt sat in chair, VBG obtained, pt to d/c home with HH or SNF, pt requires 24 hour care, multiple bowel movements today.

## 2025-06-08 NOTE — THERAPY EVALUATION
Patient Name: Gabi Dudley  : 1945    MRN: [id number]                              Today's Date: 2025       Admit Date: 2025    Visit Dx:     ICD-10-CM ICD-9-CM   1. COPD exacerbation  J44.1 491.21   2. Metabolic encephalopathy  G93.41 348.31     Patient Active Problem List   Diagnosis    Adrenal adenoma    Anxiety    Chronic fatigue    Fibromyalgia    Hyperlipidemia    Essential hypertension    Insomnia    Osteoarthritis of knee    Osteoporosis    Pulmonary emphysema    Simple renal cyst    Tension headache    Vitamin D deficiency    Diverticulosis    Inversion of nipple    Paroxysmal atrial fibrillation    Coronary artery disease involving native coronary artery of native heart without angina pectoris    Autonomic dysfunction    Gastroesophageal reflux disease without esophagitis    Urge incontinence    Erythrasma    Compression fracture of T5 vertebra    Lung nodule    COPD (chronic obstructive pulmonary disease)    Overweight (BMI 25.0-29.9)    Acute on chronic respiratory failure with hypoxia    Acute on chronic respiratory failure with hypoxia and hypercapnia    Iron deficiency anemia    Impaired mobility and ADLs    Acute respiratory failure with hypoxia and hypercapnia    Aspiration pneumonia of right lower lobe    Partial small bowel obstruction    Moderate malnutrition    Bowel obstruction    Enteritis    Diverticulitis    Elevated troponin    Chronic combined systolic and diastolic CHF (congestive heart failure)    NSTEMI (non-ST elevated myocardial infarction)    COPD exacerbation    Altered mental status    Altered mental status, unspecified     Past Medical History:   Diagnosis Date    Adrenal adenoma     Anemia     Arrhythmia     Asthma     Atrial fibrillation     Back pain     Benign colonic polyp     Benign tumor of adrenal gland     Cataract     bilateral    Cholelithiasis     Chronic bronchitis     Chronic bronchitis with COPD (chronic obstructive pulmonary disease)     COPD  (chronic obstructive pulmonary disease) 2005    Coronary artery disease     Depression     Diverticulosis Years ago    Elevated cholesterol     Environmental and seasonal allergies     Fibromyalgia     Fibromyalgia, primary Sometime in the 90s    Gastritis     Generalized anxiety disorder     GERD (gastroesophageal reflux disease)     H/O mammogram     Headache Years ago    Hemorrhoids     History of blood transfusion 1985    History of blood transfusion 1985    History of echocardiogram     History of endometriosis     History of nuclear stress test     Hospitalization or health care facility admission within last 6 months     5 times between 11/2022-05/2023    Hypertension     IBS (irritable bowel syndrome)     Impaired functional mobility, balance, gait, and endurance     Impaired mobility     Inverted nipple     Kidney disease     Kidney stone     Liver cyst     Low back pain Years ago    Nodular radiologic density     Nodule of left lung     NSTEMI (non-ST elevated myocardial infarction) 9/24/2024    On home oxygen therapy     2 liters NC QHS    Osteoarthritis     Osteopenia Several years ago    Osteoporosis     PONV (postoperative nausea and vomiting)     Renal cyst     Sinus problem     2014    Sinusitis     Skin cancer     basal cell carcinoma    SOB (shortness of breath)     Tobacco use     Urinary frequency     Urinary tract infection Years ago    Frequent UTIs    Vitamin D deficiency     Wears glasses     Wears partial dentures     upper plate     Past Surgical History:   Procedure Laterality Date    APPENDECTOMY  1980s    CARDIAC CATHETERIZATION  2003    CATARACT EXTRACTION  2013    both eyes    CHOLECYSTECTOMY  2020    CHOLECYSTECTOMY WITH INTRAOPERATIVE CHOLANGIOGRAM N/A 10/30/2020    Procedure: CHOLECYSTECTOMY LAPAROSCOPIC INTRAOPERATIVE CHOLANGIOGRAPHY;  Surgeon: Sima Moses MD;  Location: [facility];  Service: General;  Laterality: N/A;    COLON SURGERY  2020    COLONOSCOPY  03/11/2013     COLONOSCOPY  06/21/2016    COLONOSCOPY N/A 07/24/2019    Procedure: COLONOSCOPY W/ COLD FORCEP POLYPECTOMIES; HOT SNARE POLYPECTOMIES; COLD SNARE POLYPECTOMY;  Surgeon: Goyo Nunez MD;  Location: [facility] ENDOSCOPY;  Service: Gastroenterology    COLONOSCOPY W/ BIOPSIES AND POLYPECTOMY      EXPLORATORY LAPAROTOMY N/A 11/23/2020    Procedure: colectomy, right, closure of enterotomy x 2, reduction of internal volvulus;  Surgeon: Sima Moses MD;  Location: [facility] OR;  Service: General;  Laterality: N/A;    EXPLORATORY LAPAROTOMY N/A 8/9/2023    Procedure: LAPAROTOMY EXPLORATORY WITH LYIS OF ADHESIONS AND  CENTRAL LINE INSERTION;  Surgeon: Bianca Isaac DO;  Location: [facility] OR;  Service: General;  Laterality: N/A;    HYSTERECTOMY  1980s    partial    LYSIS OF ABDOMINAL ADHESIONS      TONSILLECTOMY  1987    UPPER GASTROINTESTINAL ENDOSCOPY  01/13/2015    VAGINAL DELIVERY      x2      General Information       Row Name 06/08/25 1025          Physical Therapy Time and Intention    Document Type evaluation  -TW     Mode of Treatment physical therapy  -TW       Row Name 06/08/25 1025          General Information    Patient Profile Reviewed yes  -TW     Prior Level of Function all household mobility  Pt not able to provide info. From previous admissions pt uses a rollator at home but it appears that she has been needing increased assist with mobility at home per chart review.  -TW     Existing Precautions/Restrictions fall;oxygen therapy device and L/min  -TW     Barriers to Rehab medically complex;previous functional deficit;cognitive status  -TW       Row Name 06/08/25 1025          Living Environment    Current Living Arrangements home  -TW     People in Home child(ashely), adult  -TW       Row Name 06/08/25 1025          Stairs Within Home, Primary    Number of Stairs, Within Home, Primary none  -TW       Row Name 06/08/25 1025          Cognition    Orientation Status (Cognition) oriented to;person;other (see  comments)  Pt very confused this date. She kept thinking she was in bed when she was sitting in recliner trying to roll onto her R side. Did not remember her age, place or day/year.  -       Row Name 06/08/25 1025          Safety Issues/Impairments Affecting Functional Mobility    Safety Issues Affecting Function (Mobility) ability to follow commands;at risk behavior observed;awareness of need for assistance;insight into deficits/self-awareness;judgment;positioning of assistive device;problem-solving;safety precautions follow-through/compliance;sequencing abilities  -     Impairments Affecting Function (Mobility) balance;cognition;endurance/activity tolerance;coordination;strength  -TW     Cognitive Impairments, Mobility Safety/Performance attention;awareness, need for assistance;impulsivity;insight into deficits/self-awareness;judgment;problem-solving/reasoning;safety precaution awareness;safety precaution follow-through;sequencing abilities  -TW               User Key  (r) = Recorded By, (t) = Taken By, (c) = Cosigned By      Initials Name Provider Type    TW Alice Laura, HANS Physical Therapist                   Mobility       Row Name 06/08/25 1025          Bed Mobility    Bed Mobility sit-supine;rolling right  -TW     Rolling Right [place] (Bed Mobility) standby assist;verbal cues  -     Sit-Supine [place] (Bed Mobility) standby assist;verbal cues  -     Assistive Device (Bed Mobility) bed rails  -       Row Name 06/08/25 1025          Transfers    Comment, (Transfers) Chair to BSC  -       Row Name 06/08/25 1025          Bed-Chair Transfer    Bed-Chair [place] (Transfers) verbal cues;contact guard  -     Assistive Device (Bed-Chair Transfers) walker, front-wheeled  -       Row Name 06/08/25 1025          Sit-Stand Transfer    Sit-Stand [place] (Transfers) contact guard;verbal cues  -     Assistive Device (Sit-Stand Transfers) walker, front-wheeled  -       Row Name  06/08/25 1025          Gait/Stairs (Locomotion)    [place] Level (Gait) minimum assist (75% patient effort);verbal cues  -TW     Assistive Device (Gait) walker, front-wheeled  -TW     Patient was able to Ambulate yes  -TW     Distance in Feet (Gait) 5  -TW     Deviations/Abnormal Patterns (Gait) festinating/shuffling  -TW     Bilateral Gait Deviations forward flexed posture;heel strike decreased  -TW     Comment, (Gait/Stairs) cues to stay up inside the walker and to assist with safe turning of FWW to sit down safely.  -TW               User Key  (r) = Recorded By, (t) = Taken By, (c) = Cosigned By      Initials Name Provider Type    TW Alice Laura PT Physical Therapist                   Obj/Interventions       Row Name 06/08/25 1025          Range of Motion Comprehensive    Comment, General Range of Motion BLE grossly WFLS  -TW       Row Name 06/08/25 1025          Strength Comprehensive (MMT)    Comment, General Manual Muscle Testing (MMT) Assessment It was difficult for pt to folloow MMT directions. Pt's functional strength grossly 3/5  -TW       Row Name 06/08/25 1025          Balance    Balance Assessment sitting static balance;sitting dynamic balance;standing static balance;standing dynamic balance  -TW     Static Sitting Balance standby assist  -TW     Dynamic Sitting Balance standby assist  -TW     Position, Sitting Balance unsupported;sitting edge of bed  -TW     Static Standing Balance contact guard  -TW     Dynamic Standing Balance minimal assist  -TW     Position/Device Used, Standing Balance supported;walker, rolling  -TW               User Key  (r) = Recorded By, (t) = Taken By, (c) = Cosigned By      Initials Name Provider Type    TW Alice Laura PT Physical Therapist                   Goals/Plan       Row Name 06/08/25 1025          Bed Mobility Goal 1 (PT)    Activity/Assistive Device (Bed Mobility Goal 1, PT) bed mobility activities, all  -TW     [place] Level/Cues Needed (Bed  Mobility Goal 1, PT) supervision required  -TW     Time Frame (Bed Mobility Goal 1, PT) short term goal (STG);5 days  -TW     Strategies/Barriers (Bed Mobility Goal 1, PT) Demonstrating good safety awareness.  -TW     Progress/Outcomes (Bed Mobility Goal 1, PT) goal ongoing  -TW       Row Name 06/08/25 1025          Transfer Goal 1 (PT)    Activity/Assistive Device (Transfer Goal 1, PT) sit-to-stand/stand-to-sit;bed-to-chair/chair-to-bed;toilet  -TW     [place] Level/Cues Needed (Transfer Goal 1, PT) supervision required;verbal cues required  -TW     Time Frame (Transfer Goal 1, PT) long term goal (LTG);2 weeks  -TW     Strategies/Barriers (Transfers Goal 1, PT) Demonstrating good safety awareness.  -TW     Progress/Outcome (Transfer Goal 1, PT) goal ongoing  -TW       Row Name 06/08/25 1025          Gait Training Goal 1 (PT)    Activity/Assistive Device (Gait Training Goal 1, PT) assistive device use;gait (walking locomotion);decrease fall risk;increase endurance/gait distance  -TW     [place] Level (Gait Training Goal 1, PT) standby assist;verbal cues required  -TW     Distance (Gait Training Goal 1, PT) 50 ft x 2  -TW     Time Frame (Gait Training Goal 1, PT) long term goal (LTG);2 weeks  -TW     Strategies/Barriers (Gait Training Goal 1, PT) Demonstrating good safety awareness. with O2 sat above 90%  -TW     Progress/Outcome (Gait Training Goal 1, PT) goal ongoing  -TW       Row Name 06/08/25 1025          Patient Education Goal (PT)    Activity (Patient Education Goal, PT) BLE ther ex 1 x 10  -TW     [place]/Cues/Accuracy (Memory Goal 2, PT) demonstrates adequately  -TW     Time Frame (Patient Education Goal, PT) long term goal (LTG)  -TW     Progress/Outcome (Patient Education Goal, PT) goal ongoing  -TW       Row Name 06/08/25 1025          Therapy Assessment/Plan (PT)    Planned Therapy Interventions (PT) balance training;bed mobility training;gait training;patient/family education;transfer  training;strengthening  -TW               User Key  (r) = Recorded By, (t) = Taken By, (c) = Cosigned By      Initials Name Provider Type    TW Alice Laura, HANS Physical Therapist                   Clinical Impression       Row Name 06/08/25 1025          Pain    Pretreatment Pain Rating 0/10 - no pain  -TW     Posttreatment Pain Rating 0/10 - no pain  -TW       Row Name 06/08/25 1025          Plan of Care Review    Plan of Care Reviewed With patient  -TW     Outcome Evaluation PT evaluation completed this am with pt presenting up in recliner, demonstrating restles behavior, and trying to get onto her R side. Pt was only consistently oriented to herself and demonstrated poor safety awareness throughout the session. Pt had no c/o pain but did state she was tired. Pt was on 2L NC but her pulse ox sensor had been pulled off. Pt was assisted to BSC with use of FWW and CGA. Then pt amb 5 ft from BSC to bed with FWW and verbal cues and min assist. Pt was able to transition herself to supine from sitting on EOB with modified independence and rolled onto her R side with modified independence. New pulse Ox sensor donned and pt's O2 sat 98%. Pt does present with deficits in strength, balance, and endurance. As compared to her last hospital admission pt is not able to follow directions as well and is more confused and may limit her participation and progress toward her inpt PT goals.  -TW       Row Name 06/08/25 1025          Therapy Assessment/Plan (PT)    Patient/Family Therapy Goals Statement (PT) None stated.  -TW     Rehab Potential (PT) fair  -TW     Criteria for Skilled Interventions Met (PT) yes;meets criteria  -TW     Therapy Frequency (PT) 5 times/wk  -TW     Predicted Duration of Therapy Intervention (PT) 2 wks  -TW       Row Name 06/08/25 1025          Vital Signs    O2 Delivery Pre Treatment supplemental O2  -TW     Post SpO2 (%) 98  -TW     O2 Delivery Post Treatment supplemental O2  2 L  -TW     Pre Patient  Position Sitting  -TW     Intra Patient Position Standing  -TW     Post Patient Position Side Lying  -TW       Row Name 06/08/25 1025          Positioning and Restraints    Pre-Treatment Position sitting in chair/recliner  -TW     Post Treatment Position bed  -TW     In Bed notified nsg;side lying right;call light within reach;encouraged to call for assist;exit alarm on;side rails up x3  -TW               User Key  (r) = Recorded By, (t) = Taken By, (c) = Cosigned By      Initials Name Provider Type    TW Alice Laura PT Physical Therapist                   Outcome Measures       Row Name 06/08/25 1025 06/08/25 0912       How much help from another person do you currently need...    Turning from your back to your side while in flat bed without using bedrails? 4  -TW 3  -CC    Moving from lying on back to sitting on the side of a flat bed without bedrails? 3  -TW 3  -CC    Moving to and from a bed to a chair (including a wheelchair)? 3  -TW 3  -CC    Standing up from a chair using your arms (e.g., wheelchair, bedside chair)? 3  -TW 3  -CC    Climbing 3-5 steps with a railing? 2  -TW 2  -CC    To walk in hospital room? 3  -TW 3  -CC    AM-PAC 6 Clicks Score (PT) 18  -TW 17  -CC    Highest Level of Mobility Goal Walk 10 Steps or More-6  -TW Stand (1 or More Minutes)-5  -CC      Row Name 06/08/25 1025          Functional Assessment    Outcome Measure Options AM-PAC 6 Clicks Basic Mobility (PT)  -TW               User Key  (r) = Recorded By, (t) = Taken By, (c) = Cosigned By      Initials Name Provider Type    Liberty Leyva, RN Registered Nurse    Alice Avila PT Physical Therapist                                 Physical Therapy Education       Title: PT OT SLP Therapies (In Progress)       Topic: Physical Therapy (In Progress)       Point: Mobility training (Done)       Learning Progress Summary            Patient Acceptance, EKOFI,NR by TW at 6/8/2025 1025    Comment: Pt education for purpose of PT  evaluation and pt stated she understood but then would forget why therapist was there.                      Point: Home exercise program (Not Started)       Learner Progress:  Not documented in this visit.              Point: Body mechanics (Not Started)       Learner Progress:  Not documented in this visit.              Point: Precautions (Not Started)       Learner Progress:  Not documented in this visit.                              User Key       Initials Effective Dates Name Provider Type Discipline     06/16/21 -  Alice Laura, PT Physical Therapist PT                  PT Recommendation and Plan  Planned Therapy Interventions (PT): balance training, bed mobility training, gait training, patient/family education, transfer training, strengthening  Outcome Evaluation: PT evaluation completed this am with pt presenting up in recliner, demonstrating restles behavior, and trying to get onto her R side. Pt was only consistently oriented to herself and demonstrated poor safety awareness throughout the session. Pt had no c/o pain but did state she was tired. Pt was on 2L NC but her pulse ox sensor had been pulled off. Pt was assisted to BSC with use of FWW and CGA. Then pt amb 5 ft from BSC to bed with FWW and verbal cues and min assist. Pt was able to transition herself to supine from sitting on EOB with modified independence and rolled onto her R side with modified independence. New pulse Ox sensor donned and pt's O2 sat 98%. Pt does present with deficits in strength, balance, and endurance. As compared to her last hospital admission pt is not able to follow directions as well and is more confused and may limit her participation and progress toward her inpt PT goals.     Time Calculation:   PT Evaluation Complexity  History, PT Evaluation Complexity: 3 or more personal factors and/or comorbidities  Examination of Body Systems (PT Eval Complexity): total of 4 or more elements  Clinical Presentation (PT Evaluation  Complexity): unstable  Clinical Decision Making (PT Evaluation Complexity): moderate complexity  Overall Complexity (PT Evaluation Complexity): moderate complexity     PT Charges       Row Name 06/08/25 1025             Time Calculation    Stop Time 1025  -TW      PT Received On 06/08/25 -TW      PT Goal Re-Cert Due Date 06/18/25 -TW                User Key  (r) = Recorded By, (t) = Taken By, (c) = Cosigned By      Initials Name Provider Type    TW Alice Laura, PT Physical Therapist                  Therapy Charges for Today       Code Description Service Date Service Provider Modifiers Qty    [id number] HC PT EVAL MOD COMPLEXITY 3 6/8/2025 Alice Laura PT GP 1            PT G-Codes  Outcome Measure Options: AM-PAC 6 Clicks Basic Mobility (PT)  AM-PAC 6 Clicks Score (PT): 18  PT Discharge Summary  Anticipated Discharge Disposition (PT): home with 24/7 care, skilled nursing facility    Alice Laura PT  6/8/2025

## 2025-06-08 NOTE — PLAN OF CARE
Problem: Noninvasive Ventilation Acute  Goal: Effective Unassisted Ventilation and Oxygenation  Outcome: Progressing  Intervention: Monitor and Manage Noninvasive Ventilation  Recent Flowsheet Documentation  Taken 6/8/2025 0949 by Moustapha Rodrigues, RRT  Airway/Ventilation Management: airway patency maintained  NPPV/CPAP Maintenance: proper fit/secure   Goal Outcome Evaluation:               RT EQUIPMENT DEVICE RELATED - SKIN ASSESSMENT    Jed Score:  Jed Score: 16     RT Medical Equipment/Device:     NIV Mask:  Under-the-nose   size:       Skin Assessment:      Nose:  Redness and Intact    Device Skin Pressure Protection:       Nurse Notification:  No    Moustapha Rodrigues, RRT

## 2025-06-08 NOTE — PLAN OF CARE
Goal Outcome Evaluation:  Plan of Care Reviewed With: patient           Outcome Evaluation: PT evaluation completed this am with pt presenting up in recliner, demonstrating restles behavior, and trying to get onto her R side. Pt was only consistently oriented to herself and demonstrated poor safety awareness throughout the session. Pt had no c/o pain but did state she was tired. Pt was on 2L NC but her pulse ox sensor had been pulled off. Pt was assisted to BSC with use of FWW and CGA. Then pt amb 5 ft from BSC to bed with FWW and verbal cues and min assist. Pt was able to transition herself to supine from sitting on EOB with modified independence and rolled onto her R side with modified independence. New pulse Ox sensor donned and pt's O2 sat 98%. Pt does present with deficits in strength, balance, and endurance. As compared to her last hospital admission pt is not able to follow directions as well and is more confused and may limit her participation and progress toward her inpt PT goals.    Anticipated Discharge Disposition (PT): home with 24/7 care, skilled nursing facility

## 2025-06-08 NOTE — PLAN OF CARE
Goal Outcome Evaluation:         No acute events overnight. Compliant with Bipap most of the night. VSS on 2L NC. POC continues.

## 2025-06-08 NOTE — PLAN OF CARE
Problem: Noninvasive Ventilation Acute  Goal: Effective Unassisted Ventilation and Oxygenation  6/8/2025 0017 by Everton Serra, ESTELA  Outcome: Progressing  6/7/2025 2208 by Everton Serra, ESTELA  Outcome: Not Progressing  Intervention: Monitor and Manage Noninvasive Ventilation  Flowsheets (Taken 6/8/2025 0017)  Airway/Ventilation Management:   airway patency maintained   oxygen therapy provided   positive pressure ventilation provided  NPPV/CPAP Maintenance: proper fit/secure   Goal Outcome Evaluation:         Placed by rn

## 2025-06-09 LAB
ANION GAP SERPL CALCULATED.3IONS-SCNC: 9.7 MMOL/L (ref 5–15)
ATMOSPHERIC PRESS: 729 MMHG
BASE EXCESS BLDV CALC-SCNC: 16.2 MMOL/L (ref 0–2)
BDY SITE: ABNORMAL
BUN SERPL-MCNC: 28 MG/DL (ref 8–23)
BUN/CREAT SERPL: 54.9 (ref 7–25)
CALCIUM SPEC-SCNC: 9.2 MG/DL (ref 8.6–10.5)
CHLORIDE SERPL-SCNC: 98 MMOL/L (ref 98–107)
CO2 SERPL-SCNC: 35.3 MMOL/L (ref 22–29)
COHGB MFR BLD: 1.1 % (ref 0–5)
CREAT SERPL-MCNC: 0.51 MG/DL (ref 0.57–1)
DEPRECATED RDW RBC AUTO: 49.8 FL (ref 37–54)
EGFRCR SERPLBLD CKD-EPI 2021: 95.7 ML/MIN/1.73
ERYTHROCYTE [DISTWIDTH] IN BLOOD BY AUTOMATED COUNT: 15.2 % (ref 12.3–15.4)
GAS FLOW AIRWAY: 4 LPM
GLUCOSE SERPL-MCNC: 94 MG/DL (ref 65–99)
HCO3 BLDV-SCNC: 43.5 MMOL/L (ref 22–28)
HCT VFR BLD AUTO: 31.8 % (ref 34–46.6)
HGB BLD-MCNC: 9.5 G/DL (ref 12–15.9)
Lab: ABNORMAL
MCH RBC QN AUTO: 27 PG (ref 26.6–33)
MCHC RBC AUTO-ENTMCNC: 29.9 G/DL (ref 31.5–35.7)
MCV RBC AUTO: 90.3 FL (ref 79–97)
METHGB BLD QL: 0.3 % (ref 0–3)
MODALITY: ABNORMAL
OXYHGB MFR BLDV: 65.3 % (ref 40–70)
PCO2 BLDV: 70.1 MM HG (ref 40–50)
PH BLDV: 7.4 PH UNITS (ref 7.32–7.42)
PLATELET # BLD AUTO: 255 10*3/MM3 (ref 140–450)
PMV BLD AUTO: 10.5 FL (ref 6–12)
PO2 BLDV: 35.5 MM HG (ref 30–50)
POTASSIUM SERPL-SCNC: 3.9 MMOL/L (ref 3.5–5.2)
RBC # BLD AUTO: 3.52 10*6/MM3 (ref 3.77–5.28)
SAO2 % BLDCOV: 66.2 % (ref 45–75)
SODIUM SERPL-SCNC: 143 MMOL/L (ref 136–145)
VENTILATOR MODE: ABNORMAL
WBC NRBC COR # BLD AUTO: 7.5 10*3/MM3 (ref 3.4–10.8)

## 2025-06-09 PROCEDURE — 82805 BLOOD GASES W/O2 SATURATION: CPT

## 2025-06-09 PROCEDURE — 94660 CPAP INITIATION&MGMT: CPT

## 2025-06-09 PROCEDURE — 94799 UNLISTED PULMONARY SVC/PX: CPT

## 2025-06-09 PROCEDURE — 85027 COMPLETE CBC AUTOMATED: CPT | Performed by: INTERNAL MEDICINE

## 2025-06-09 PROCEDURE — 82820 HEMOGLOBIN-OXYGEN AFFINITY: CPT

## 2025-06-09 PROCEDURE — 99232 SBSQ HOSP IP/OBS MODERATE 35: CPT | Performed by: INTERNAL MEDICINE

## 2025-06-09 PROCEDURE — 25010000002 METHYLPREDNISOLONE PER 40 MG: Performed by: FAMILY MEDICINE

## 2025-06-09 PROCEDURE — 63710000001 REVEFENACIN 175 MCG/3ML SOLUTION: Performed by: FAMILY MEDICINE

## 2025-06-09 PROCEDURE — 94664 DEMO&/EVAL PT USE INHALER: CPT

## 2025-06-09 PROCEDURE — 94761 N-INVAS EAR/PLS OXIMETRY MLT: CPT

## 2025-06-09 PROCEDURE — 80048 BASIC METABOLIC PNL TOTAL CA: CPT | Performed by: INTERNAL MEDICINE

## 2025-06-09 PROCEDURE — 97530 THERAPEUTIC ACTIVITIES: CPT

## 2025-06-09 PROCEDURE — 97116 GAIT TRAINING THERAPY: CPT

## 2025-06-09 PROCEDURE — 97166 OT EVAL MOD COMPLEX 45 MIN: CPT

## 2025-06-09 RX ORDER — OXYCODONE AND ACETAMINOPHEN 7.5; 325 MG/1; MG/1
1 TABLET ORAL EVERY 8 HOURS PRN
Status: DISCONTINUED | OUTPATIENT
Start: 2025-06-09 | End: 2025-06-12 | Stop reason: HOSPADM

## 2025-06-09 RX ADMIN — DULOXETINE 60 MG: 30 CAPSULE, DELAYED RELEASE ORAL at 08:04

## 2025-06-09 RX ADMIN — APIXABAN 5 MG: 5 TABLET, FILM COATED ORAL at 08:04

## 2025-06-09 RX ADMIN — ARFORMOTEROL TARTRATE 15 MCG: 15 SOLUTION RESPIRATORY (INHALATION) at 07:37

## 2025-06-09 RX ADMIN — ARFORMOTEROL TARTRATE 15 MCG: 15 SOLUTION RESPIRATORY (INHALATION) at 21:31

## 2025-06-09 RX ADMIN — METHYLPREDNISOLONE SODIUM SUCCINATE 40 MG: 40 INJECTION INTRAMUSCULAR; INTRAVENOUS at 05:00

## 2025-06-09 RX ADMIN — REVEFENACIN 175 MCG: 175 SOLUTION RESPIRATORY (INHALATION) at 07:37

## 2025-06-09 RX ADMIN — DULOXETINE 60 MG: 30 CAPSULE, DELAYED RELEASE ORAL at 20:05

## 2025-06-09 RX ADMIN — SERTRALINE 100 MG: 50 TABLET, FILM COATED ORAL at 08:04

## 2025-06-09 RX ADMIN — LOSARTAN POTASSIUM 25 MG: 25 TABLET, FILM COATED ORAL at 08:04

## 2025-06-09 RX ADMIN — Medication 10 ML: at 08:04

## 2025-06-09 RX ADMIN — ACETAMINOPHEN 650 MG: 325 TABLET, FILM COATED ORAL at 03:20

## 2025-06-09 RX ADMIN — Medication 10 ML: at 20:06

## 2025-06-09 RX ADMIN — ATORVASTATIN CALCIUM 40 MG: 40 TABLET, FILM COATED ORAL at 08:04

## 2025-06-09 RX ADMIN — OXYCODONE HYDROCHLORIDE AND ACETAMINOPHEN 1 TABLET: 7.5; 325 TABLET ORAL at 08:03

## 2025-06-09 RX ADMIN — AMIODARONE HYDROCHLORIDE 200 MG: 200 TABLET ORAL at 08:04

## 2025-06-09 RX ADMIN — PANTOPRAZOLE SODIUM 40 MG: 40 TABLET, DELAYED RELEASE ORAL at 08:03

## 2025-06-09 RX ADMIN — METOPROLOL SUCCINATE 50 MG: 50 TABLET, EXTENDED RELEASE ORAL at 08:03

## 2025-06-09 RX ADMIN — OXYCODONE HYDROCHLORIDE AND ACETAMINOPHEN 1 TABLET: 7.5; 325 TABLET ORAL at 16:55

## 2025-06-09 RX ADMIN — FUROSEMIDE 20 MG: 20 TABLET ORAL at 08:04

## 2025-06-09 RX ADMIN — QUETIAPINE FUMARATE 25 MG: 25 TABLET ORAL at 20:06

## 2025-06-09 RX ADMIN — BUDESONIDE 0.5 MG: 0.5 SUSPENSION RESPIRATORY (INHALATION) at 07:37

## 2025-06-09 RX ADMIN — METHYLPREDNISOLONE SODIUM SUCCINATE 40 MG: 40 INJECTION INTRAMUSCULAR; INTRAVENOUS at 15:09

## 2025-06-09 RX ADMIN — BUDESONIDE 0.5 MG: 0.5 SUSPENSION RESPIRATORY (INHALATION) at 21:31

## 2025-06-09 RX ADMIN — APIXABAN 5 MG: 5 TABLET, FILM COATED ORAL at 20:05

## 2025-06-09 NOTE — PLAN OF CARE
Problem: Noninvasive Ventilation Acute  Goal: Effective Unassisted Ventilation and Oxygenation  Outcome: Progressing   Goal Outcome Evaluation:         RT EQUIPMENT DEVICE RELATED - SKIN ASSESSMENT    Jed Score:  Jed Score: 14     RT Medical Equipment/Device:     NIV Mask:  Under-the-nose   size:       Skin Assessment:      Nose:  Intact    Device Skin Pressure Protection:  Pressure points protected    Nurse Notification:  Cora Joel, CRT

## 2025-06-09 NOTE — PROGRESS NOTES
Patient Name: Gabi Dudley  YOB: 1945  MRN: [id number]  Admission date: 6/7/2025  Reason for Encounter: MST 2-3 or Nursing Admission Screen    [facility] Clinical Nutrition Assessment     Subjective    Subjective Information     6/9: MST=3 d/t unsure wt loss and eating poorly. Average PO intake 50% x 5 meals. EMR does show wt loss of 9# (6.4%) within 5 months. Wt loss is not significant to malnutrition timeframe criteria. Will order Boost Original daily to promote PO intake/wt stability.      Assessment    H&P and Current Problems      H&P  Past Medical History:   Diagnosis Date    Adrenal adenoma     Anemia     Arrhythmia     Asthma     Atrial fibrillation     Back pain     Benign colonic polyp     Benign tumor of adrenal gland     Cataract     bilateral    Cholelithiasis     Chronic bronchitis     Chronic bronchitis with COPD (chronic obstructive pulmonary disease)     COPD (chronic obstructive pulmonary disease) 2005    Coronary artery disease     Depression     Diverticulosis Years ago    Elevated cholesterol     Environmental and seasonal allergies     Fibromyalgia     Fibromyalgia, primary Sometime in the 90s    Gastritis     Generalized anxiety disorder     GERD (gastroesophageal reflux disease)     H/O mammogram     Headache Years ago    Hemorrhoids     History of blood transfusion 1985    History of blood transfusion 1985    History of echocardiogram     History of endometriosis     History of nuclear stress test     Hospitalization or health care facility admission within last 6 months     5 times between 11/2022-05/2023    Hypertension     IBS (irritable bowel syndrome)     Impaired functional mobility, balance, gait, and endurance     Impaired mobility     Inverted nipple     Kidney disease     Kidney stone     Liver cyst     Low back pain Years ago    Nodular radiologic density     Nodule of left lung     NSTEMI (non-ST elevated myocardial infarction) 9/24/2024    On home oxygen  therapy     2 liters [facility]    Osteoarthritis     Osteopenia Several years ago    Osteoporosis     PONV (postoperative nausea and vomiting)     Renal cyst     Sinus problem     2014    Sinusitis     Skin cancer     basal cell carcinoma    SOB (shortness of breath)     Tobacco use     Urinary frequency     Urinary tract infection Years ago    Frequent UTIs    Vitamin D deficiency     Wears glasses     Wears partial dentures     upper plate      Past Surgical History:   Procedure Laterality Date    APPENDECTOMY  1980s    CARDIAC CATHETERIZATION  2003    CATARACT EXTRACTION  2013    both eyes    CHOLECYSTECTOMY  2020    CHOLECYSTECTOMY WITH INTRAOPERATIVE CHOLANGIOGRAM N/A 10/30/2020    Procedure: CHOLECYSTECTOMY LAPAROSCOPIC INTRAOPERATIVE CHOLANGIOGRAPHY;  Surgeon: Sima Moses MD;  Location: [facility] OR;  Service: General;  Laterality: N/A;    COLON SURGERY  2020    COLONOSCOPY  03/11/2013    COLONOSCOPY  06/21/2016    COLONOSCOPY N/A 07/24/2019    Procedure: COLONOSCOPY W/ COLD FORCEP POLYPECTOMIES; HOT SNARE POLYPECTOMIES; COLD SNARE POLYPECTOMY;  Surgeon: Goyo Nunez MD;  Location: [facility] ENDOSCOPY;  Service: Gastroenterology    COLONOSCOPY W/ BIOPSIES AND POLYPECTOMY      EXPLORATORY LAPAROTOMY N/A 11/23/2020    Procedure: colectomy, right, closure of enterotomy x 2, reduction of internal volvulus;  Surgeon: Sima Moses MD;  Location: [facility] OR;  Service: General;  Laterality: N/A;    EXPLORATORY LAPAROTOMY N/A 8/9/2023    Procedure: LAPAROTOMY EXPLORATORY WITH LYIS OF ADHESIONS AND  CENTRAL LINE INSERTION;  Surgeon: Bianca Isaac DO;  Location: [facility] OR;  Service: General;  Laterality: N/A;    HYSTERECTOMY  1980s    partial    LYSIS OF ABDOMINAL ADHESIONS      TONSILLECTOMY  1987    UPPER GASTROINTESTINAL ENDOSCOPY  01/13/2015    VAGINAL DELIVERY      x2      Current Problems   Admission Diagnosis:  Acute on chronic respiratory failure with hypoxia and hypercapnia [J96.21, J96.22]    Problem  "List:    Acute on chronic respiratory failure with hypoxia and hypercapnia      Other Applicable Nutrition Information:        Anthropometrics      BMI, Height, Weight Estimated body mass index is 21.8 kg/m² as calculated from the following:    Height as of this encounter: 165.1 cm (65\").    Weight as of this encounter: 59.4 kg (131 lb).    Weight Method: Stated       Trending Weight Changes weight loss of 9 lbs (6.4%) over 5 month(s)    Significant?  No       Weight History  Wt Readings from Last 20 Encounters:   06/07/25 0328 59.4 kg (131 lb)   05/29/25 1345 59.4 kg (131 lb)   05/16/25 0456 62.1 kg (136 lb 14.5 oz)   05/15/25 0630 60.4 kg (133 lb 2.5 oz)   05/14/25 0428 60 kg (132 lb 4.4 oz)   05/13/25 0600 60.5 kg (133 lb 6.1 oz)   05/12/25 1031 62 kg (136 lb 11 oz)   05/12/25 0536 59 kg (130 lb)   05/06/25 0600 59.5 kg (131 lb 2.8 oz)   05/04/25 1105 59.4 kg (130 lb 15.3 oz)   05/03/25 1640 59.4 kg (131 lb)   05/03/25 1401 59 kg (130 lb)   05/01/25 1535 59 kg (130 lb)   04/14/25 1628 59.4 kg (131 lb)   04/09/25 2119 59 kg (130 lb)   02/13/25 1615 58.1 kg (128 lb)   01/02/25 1628 63.5 kg (140 lb)   12/20/24 1439 63.5 kg (139 lb 15.9 oz)   12/20/24 0332 63.5 kg (140 lb)   07/23/24 1445 68 kg (149 lb 14.6 oz)   07/23/24 1229 68 kg (149 lb 14.6 oz)   07/22/24 0941 68 kg (149 lb 14.6 oz)   07/10/24 1712 68 kg (150 lb)   06/27/24 2334 68 kg (150 lb)   06/13/24 1735 68 kg (150 lb)   06/05/24 1653 68 kg (150 lb)   01/25/24 1435 64.9 kg (143 lb)   01/09/24 1626 64.9 kg (143 lb)   12/11/23 1641 65.8 kg (145 lb)   10/31/23 1610 65.8 kg (145 lb)   09/26/23 1538 66.2 kg (146 lb)        Labs      Comment:      Results from last 7 days   Lab Units 06/09/25  0737 06/08/25  0707 06/07/25  0315 06/07/25  0314   SODIUM mmol/L 143 145  --  146*   POTASSIUM mmol/L 3.9 3.9  --  4.2   GLUCOSE mg/dL 94 101*  --  81   BUN mg/dL 28.0* 25.0*  --  22.0   CREATININE mg/dL 0.51* 0.56*  --  0.49*   CALCIUM mg/dL 9.2 9.5  --  9.5   ALBUMIN " g/dL  --   --   --  3.2*   LACTATE mmol/L  --   --  0.5  --    BILIRUBIN mg/dL  --   --   --  0.2   ALK PHOS U/L  --   --   --  86   AST (SGOT) U/L  --   --   --  34*   ALT (SGPT) U/L  --   --   --  34*   PROBNP pg/mL  --   --   --  2,739.0*     Results from last 7 days   Lab Units 06/09/25  0737 06/08/25  0707 06/07/25  0314   PLATELETS 10*3/mm3 255 228 188   HEMOGLOBIN g/dL 9.5* 8.5* 9.1*   HEMATOCRIT % 31.8* 28.9* 32.7*     Lab Results   Component Value Date    HGBA1C 6.20 (H) 01/09/2017          Medications       Scheduled Medications amiodarone, 200 mg, Oral, Q24H  apixaban, 5 mg, Oral, Q12H  arformoterol, 15 mcg, Nebulization, BID - RT   And  budesonide, 0.5 mg, Nebulization, BID - RT   And  revefenacin, 175 mcg, Nebulization, Daily - RT  atorvastatin, 40 mg, Oral, Daily  DULoxetine, 60 mg, Oral, BID  furosemide, 20 mg, Oral, Daily  losartan, 25 mg, Oral, Daily  methylPREDNISolone sodium succinate, 40 mg, Intravenous, Q12H  metoprolol succinate XL, 50 mg, Oral, Daily  pantoprazole, 40 mg, Oral, Daily  sertraline, 100 mg, Oral, Daily  sodium chloride, 10 mL, Intravenous, Q12H        Infusions      PRN Medications   acetaminophen **OR** acetaminophen **OR** acetaminophen    aluminum-magnesium hydroxide-simethicone    benzonatate    senna-docusate sodium **AND** polyethylene glycol **AND** bisacodyl **AND** bisacodyl    Diclofenac Sodium    ipratropium-albuterol    nitroglycerin    ondansetron ODT **OR** ondansetron    oxyCODONE-acetaminophen    QUEtiapine    sodium chloride    sodium chloride    sodium chloride     Physical Findings      Chewing/Swallowing  Teeth Status: Mouth/Teeth WDL: .WDL except, teeth Teeth Symptoms: dental appliance present, tooth/teeth missing  Chewing/Swallowing Issues: No issues identified at this time   Edema                            Bowel Function  Stool Output  Stool Unmeasured Occurrence: 1 (06/08/25 1833)  Bowel Incontinence: No (06/08/25 1833)  Stool Amount: Small (06/08/25  1833)  Stool Consistency: formed (06/08/25 1833)  Perineal Care: perineum cleansed (06/08/25 1833)  Last Bowel Movement: 06/08/25   I/Os  Intake & Output (last 3 days)         06/06 0701  06/07 0700 06/07 0701  06/08 0700 06/08 0701  06/09 0700 06/09 0701  06/10 0700    P.O.  720 120 120    Total Intake(mL/kg)  720 (12.1) 120 (2) 120 (2)    Urine (mL/kg/hr)  300 (0.2)      Stool  0      Total Output  300      Net  +420 +120 +120            Urine Unmeasured Occurrence  4 x 6 x 1 x    Stool Unmeasured Occurrence  2 x 4 x            Lines, Drains, Airways, & Wounds       Active LDAs       Name Placement date Placement time Site Days Last dressing change    Peripheral IV 18 G Right Antecubital --  --  Antecubital  --     Wound 05/12/25 1013 perineum 05/12/25  1013  -- 27                       Nutrition Focused Physical Exam     Trending NFPE      Malnutrition Severity Assessment              Estimated Needs      Energy Requirements    Weight for Calculation 59.4 kg    Method for Estimation  30-35 kcals/kg   Daily Needs (kcal/day) 0115-7772 kcal    Protein Requirements    Weight for Calculation 59.4 kg    Method for Estimation 1.2-1.5 gm/kg   Daily Needs (g/day) 71-89 g pro   Fluid Requirements     Method for Estimation 1 mL/kcal    Daily Needs (mL/day) 9676-5806 mL     Current Nutrition Orders & Evaluation of Intake      Oral Nutrition     Food Allergies  and Intolerances NKFA   Current PO Diet Diet: Cardiac; Healthy Heart (2-3 Na+); Fluid Consistency: Thin (IDDSI 0)   Oral Nutrition Supplement None     Trending % PO Intake 6/9: 50% x 4 meals     Enteral Nutrition     Current EN Order Patient isn't on Tube Feeding  Patient doesn't have any tube feeding modular orders     EN Route      EN Tolerance     EN Observation/Intake         Parenteral Nutrition     Current TPN Order    TPN Route    Lipids (mL/%/frequency)     Total # Days on TPN    TPN Observation/Intake       Assessment & Plan   Nutrition Diagnosis and Goals        Nutrition Diagnosis 1 Underweight r/t COPD AEB BMI=21.8.       Nutrition Diagnosis 2         Goal(s) Increase PO Intake  and Accepts Oral Nutrition Supplement     Nutrition Intervention and Prescription       Intervention  Start oral nutrition supplement      Diet Prescription HH    Supplement Prescription Boost original daily      Enteral Prescription        TPN Prescription        Education Provided       Monitoring/Evaluation       Monitor/Evaluation Per Protocol, I&O, PO Intake, Oral Nutrition Supplement Intake, Pertinent Labs, Weight, Skin Status, GI Status, Symptoms, and POC/GOC     RD Follow-Up Encounter 1-2 days     Electronically signed by:  Zandra Flores RD  06/09/25 09:01 EDT

## 2025-06-09 NOTE — CASE MANAGEMENT/SOCIAL WORK
Per huddle update, pt/family would like STR. Met with pt, she does not want [facility]. Phoned her daughter Farida, message left for her. Will follow.

## 2025-06-09 NOTE — PLAN OF CARE
Goal Outcome Evaluation:            Patient has been stable throughout shift with no acute changes. Received prn pain medication. VSS. Plan of care ongoing.

## 2025-06-09 NOTE — CASE MANAGEMENT/SOCIAL WORK
Case Management/Social Work    Patient Name:  Gabi Dudley  YOB: 1945  MRN: [id number]  Admit Date:  6/7/2025        09:40 EDT   Discharge Plan       Row Name 06/09/25 0903       Plan    Plan pall consult tbd dcp                        Electronically signed by:  Carolyn Lucas RN  06/09/25 09:40 EDT

## 2025-06-09 NOTE — PLAN OF CARE
"Goal Outcome Evaluation:  Plan of Care Reviewed With: patient        Progress: improving  Outcome Evaluation: Patient demonstrated improving strength and balance as seen by increased gait distance and quality.  She required no more than stand by assistance to perform bed mobility.  She reports feeling dizzy and nauseated upon initial sitting.  She returns to sidelying and once she feels better she agrees to attempt walking.  She is able to walk 20 feet with RW and CGA before stating she has to sit down of she will fall down due to \"the tremblies\"  She then sat on the edge of the bed and is shaking in her arms and leg.  She is returned to right sidelying with call light in place.  Plan to continue PT per POC.                             "

## 2025-06-09 NOTE — THERAPY TREATMENT NOTE
Patient Name: Gabi Dudley  : 1945    MRN: [id number]                              Today's Date: 2025       Admit Date: 2025    Visit Dx:     ICD-10-CM ICD-9-CM   1. COPD exacerbation  J44.1 491.21   2. Metabolic encephalopathy  G93.41 348.31     Patient Active Problem List   Diagnosis    Adrenal adenoma    Anxiety    Chronic fatigue    Fibromyalgia    Hyperlipidemia    Essential hypertension    Insomnia    Osteoarthritis of knee    Osteoporosis    Pulmonary emphysema    Simple renal cyst    Tension headache    Vitamin D deficiency    Diverticulosis    Inversion of nipple    Paroxysmal atrial fibrillation    Coronary artery disease involving native coronary artery of native heart without angina pectoris    Autonomic dysfunction    Gastroesophageal reflux disease without esophagitis    Urge incontinence    Erythrasma    Compression fracture of T5 vertebra    Lung nodule    COPD (chronic obstructive pulmonary disease)    Overweight (BMI 25.0-29.9)    Acute on chronic respiratory failure with hypoxia    Acute on chronic respiratory failure with hypoxia and hypercapnia    Iron deficiency anemia    Impaired mobility and ADLs    Acute respiratory failure with hypoxia and hypercapnia    Aspiration pneumonia of right lower lobe    Partial small bowel obstruction    Moderate malnutrition    Bowel obstruction    Enteritis    Diverticulitis    Elevated troponin    Chronic combined systolic and diastolic CHF (congestive heart failure)    NSTEMI (non-ST elevated myocardial infarction)    COPD exacerbation    Altered mental status    Altered mental status, unspecified     Past Medical History:   Diagnosis Date    Adrenal adenoma     Anemia     Arrhythmia     Asthma     Atrial fibrillation     Back pain     Benign colonic polyp     Benign tumor of adrenal gland     Cataract     bilateral    Cholelithiasis     Chronic bronchitis     Chronic bronchitis with COPD (chronic obstructive pulmonary disease)     COPD  (chronic obstructive pulmonary disease) 2005    Coronary artery disease     Depression     Diverticulosis Years ago    Elevated cholesterol     Environmental and seasonal allergies     Fibromyalgia     Fibromyalgia, primary Sometime in the 90s    Gastritis     Generalized anxiety disorder     GERD (gastroesophageal reflux disease)     H/O mammogram     Headache Years ago    Hemorrhoids     History of blood transfusion 1985    History of blood transfusion 1985    History of echocardiogram     History of endometriosis     History of nuclear stress test     Hospitalization or health care facility admission within last 6 months     5 times between 11/2022-05/2023    Hypertension     IBS (irritable bowel syndrome)     Impaired functional mobility, balance, gait, and endurance     Impaired mobility     Inverted nipple     Kidney disease     Kidney stone     Liver cyst     Low back pain Years ago    Nodular radiologic density     Nodule of left lung     NSTEMI (non-ST elevated myocardial infarction) 9/24/2024    On home oxygen therapy     2 liters NC QHS    Osteoarthritis     Osteopenia Several years ago    Osteoporosis     PONV (postoperative nausea and vomiting)     Renal cyst     Sinus problem     2014    Sinusitis     Skin cancer     basal cell carcinoma    SOB (shortness of breath)     Tobacco use     Urinary frequency     Urinary tract infection Years ago    Frequent UTIs    Vitamin D deficiency     Wears glasses     Wears partial dentures     upper plate     Past Surgical History:   Procedure Laterality Date    APPENDECTOMY  1980s    CARDIAC CATHETERIZATION  2003    CATARACT EXTRACTION  2013    both eyes    CHOLECYSTECTOMY  2020    CHOLECYSTECTOMY WITH INTRAOPERATIVE CHOLANGIOGRAM N/A 10/30/2020    Procedure: CHOLECYSTECTOMY LAPAROSCOPIC INTRAOPERATIVE CHOLANGIOGRAPHY;  Surgeon: Sima Moses MD;  Location: [facility];  Service: General;  Laterality: N/A;    COLON SURGERY  2020    COLONOSCOPY  03/11/2013     COLONOSCOPY  06/21/2016    COLONOSCOPY N/A 07/24/2019    Procedure: COLONOSCOPY W/ COLD FORCEP POLYPECTOMIES; HOT SNARE POLYPECTOMIES; COLD SNARE POLYPECTOMY;  Surgeon: Goyo Nunez MD;  Location: [facility] ENDOSCOPY;  Service: Gastroenterology    COLONOSCOPY W/ BIOPSIES AND POLYPECTOMY      EXPLORATORY LAPAROTOMY N/A 11/23/2020    Procedure: colectomy, right, closure of enterotomy x 2, reduction of internal volvulus;  Surgeon: Sima Moses MD;  Location: [facility] OR;  Service: General;  Laterality: N/A;    EXPLORATORY LAPAROTOMY N/A 8/9/2023    Procedure: LAPAROTOMY EXPLORATORY WITH LYIS OF ADHESIONS AND  CENTRAL LINE INSERTION;  Surgeon: Bianca Isaac DO;  Location: [facility] OR;  Service: General;  Laterality: N/A;    HYSTERECTOMY  1980s    partial    LYSIS OF ABDOMINAL ADHESIONS      TONSILLECTOMY  1987    UPPER GASTROINTESTINAL ENDOSCOPY  01/13/2015    VAGINAL DELIVERY      x2      General Information       [facility] Name 06/09/25 1128          Physical Therapy Time and Intention    Document Type therapy note (daily note)  -     Mode of Treatment physical therapy  -       Row Name 06/09/25 1128          General Information    Patient Profile Reviewed yes  -               User Key  (r) = Recorded By, (t) = Taken By, (c) = Cosigned By      Initials Name Provider Type     Cait Tubbs, PT Physical Therapist                   Mobility       Row Name 06/09/25 1128          Bed Mobility    Bed Mobility supine-sit;sit-supine  -     Supine-Sit Russell (Bed Mobility) standby assist  -     Sit-Supine Russell (Bed Mobility) standby assist  -     Assistive Device (Bed Mobility) bed rails;head of bed elevated  -       Row Name 06/09/25 1128          Sit-Stand Transfer    Sit-Stand Russell (Transfers) contact guard;verbal cues  -     Assistive Device (Sit-Stand Transfers) walker, front-wheeled  -       Row Name 06/09/25 1128          Gait/Stairs (Locomotion)    Russell Level (Gait)  contact guard;verbal cues  -JR     Assistive Device (Gait) walker, front-wheeled  -JR     Patient was able to Ambulate yes  -JR     Distance in Feet (Gait) 20  -JR     Deviations/Abnormal Patterns (Gait) festinating/shuffling  -JR     Bilateral Gait Deviations forward flexed posture;heel strike decreased  -JR               User Key  (r) = Recorded By, (t) = Taken By, (c) = Cosigned By      Initials Name Provider Type    Cait Ruvalcaba PT Physical Therapist                   Obj/Interventions       Row Name 06/09/25 1128          Balance    Balance Assessment sitting static balance;sitting dynamic balance;standing static balance;standing dynamic balance  -JR     Static Sitting Balance standby assist  -JR     Dynamic Sitting Balance standby assist  -JR     Position, Sitting Balance unsupported;sitting edge of bed  -JR     Static Standing Balance standby assist  -JR     Dynamic Standing Balance standby assist;contact guard  -JR     Position/Device Used, Standing Balance walker, rolling;supported  -JR               User Key  (r) = Recorded By, (t) = Taken By, (c) = Cosigned By      Initials Name Provider Type    Cait Ruvalcaba PT Physical Therapist                   Goals/Plan    No documentation.                  Clinical Impression       Row Name 06/09/25 1128          Pain    Pretreatment Pain Rating 0/10 - no pain  -JR     Posttreatment Pain Rating 0/10 - no pain  -JR     Pain Side/Orientation generalized  -JR       [facility] Name 06/09/25 1128          Plan of Care Review    Plan of Care Reviewed With patient  -JR     Progress improving  -     Outcome Evaluation Patient demonstrated improving strength and balance as seen by increased gait distance and quality.  She required no more than stand by assistance to perform bed mobility.  She reports feeling dizzy and nauseated upon initial sitting.  She returns to sidelying and once she feels better she agrees to attempt walking.  She is able to walk 20 feet with RW and  "CGA before stating she has to sit down of she will fall down due to \"the tremblies\"  She then sat on the edge of the bed and is shaking in her arms and leg.  She is returned to right sidelying with call light in place.  Plan to continue PT per POC.  -JR       Row Name 06/09/25 1128          Positioning and Restraints    Pre-Treatment Position sitting in chair/recliner  -JR     Post Treatment Position bed  -JR     In Bed side lying right;call light within reach;encouraged to call for assist;notified nsg  -JR               User Key  (r) = Recorded By, (t) = Taken By, (c) = Cosigned By      Initials Name Provider Type    Cait Ruvalcaba, PT Physical Therapist                   Outcome Measures       Row Name 06/09/25 1128 06/09/25 0803       How much help from another person do you currently need...    Turning from your back to your side while in flat bed without using bedrails? 3  -JR 3  -CC    Moving from lying on back to sitting on the side of a flat bed without bedrails? 3  -JR 3  -CC    Moving to and from a bed to a chair (including a wheelchair)? 3  -JR 3  -CC    Standing up from a chair using your arms (e.g., wheelchair, bedside chair)? 3  -JR 3  -CC    Climbing 3-5 steps with a railing? 2  -JR 2  -CC    To walk in hospital room? 3  -JR 2  -CC    AM-PAC 6 Clicks Score (PT) 17  -JR 16  -CC    Highest Level of Mobility Goal Stand (1 or More Minutes)-5  -JR Stand (1 or More Minutes)-5  -CC      Row Name 06/09/25 0500          How much help from another person do you currently need...    Turning from your back to your side while in flat bed without using bedrails? 3  -ARABELLA     Moving from lying on back to sitting on the side of a flat bed without bedrails? 3  -ARABELLA     Moving to and from a bed to a chair (including a wheelchair)? 3  -ARABELLA     Standing up from a chair using your arms (e.g., wheelchair, bedside chair)? 3  -ARABELLA     Climbing 3-5 steps with a railing? 2  -ARABELLA     To walk in hospital room? 3  -ARABELLA     AM-PAC 6 " Clicks Score (PT) 17  -ARABELLA     Highest Level of Mobility Goal Stand (1 or More Minutes)-5  -ARABELLA       Row Name 06/09/25 1128          Functional Assessment    Outcome Measure Options AM-PAC 6 Clicks Basic Mobility (PT)  -               User Key  (r) = Recorded By, (t) = Taken By, (c) = Cosigned By      Initials Name Provider Type    Cait Ruvalcaba, PT Physical Therapist    Liberty Leyva, RN Registered Nurse    Nicolle Garcia RN Registered Nurse                                 Physical Therapy Education       Title: PT OT SLP Therapies (In Progress)       Topic: Physical Therapy (In Progress)       Point: Mobility training (Done)       Learning Progress Summary            Patient Acceptance, E, VU,NR by  at 6/8/2025 1025    Comment: Pt education for purpose of PT evaluation and pt stated she understood but then would forget why therapist was there.                      Point: Home exercise program (Not Started)       Learner Progress:  Not documented in this visit.              Point: Body mechanics (Done)       Learning Progress Summary            Patient Acceptance, E,TB, VU by  at 6/9/2025 1333    Comment: Posture with standing and gait                      Point: Precautions (Not Started)       Learner Progress:  Not documented in this visit.                              User Key       Initials Effective Dates Name Provider Type [facility] 08/22/23 -  Cait Tubbs, PT Physical Therapist PT     06/16/21 -  Alice Laura, [facility] Physical Therapist PT                  PT Recommendation and Plan     Progress: improving  Outcome Evaluation: Patient demonstrated improving strength and balance as seen by increased gait distance and quality.  She required no more than stand by assistance to perform bed mobility.  She reports feeling dizzy and nauseated upon initial sitting.  She returns to sidelying and once she feels better she agrees to attempt walking.  She is able to walk 20 feet with RW and CGA  "before stating she has to sit down of she will fall down due to \"the tremblies\"  She then sat on the edge of the bed and is shaking in her arms and leg.  She is returned to right sidelying with call light in place.  Plan to continue PT per POC.     Time Calculation:         PT Charges       Row Name 06/09/25 1128             Time Calculation    Start Time 1128  -JR      Stop Time 1153  -JR      Time Calculation (min) 25 min  -JR      PT Received On 06/09/25  -JR      PT Goal Re-Cert Due Date 06/18/25  -JR         Time Calculation- PT    Total Timed Code Minutes- PT 25 minute(s)  -JR         Timed Charges    34715 - Gait Training Minutes  10  -JR      27685 - PT Therapeutic Activity Minutes 15  -JR         Total Minutes    Timed Charges Total Minutes 25  -JR       Total Minutes 25  -JR                User Key  (r) = Recorded By, (t) = Taken By, (c) = Cosigned By      Initials Name Provider Type    Cait Ruvalcaba, PT Physical Therapist                  Therapy Charges for Today       Code Description Service Date Service Provider Modifiers Qty    18420713490 HC PT THERAPEUTIC ACT EA 15 MIN 6/9/2025 Cait Tubbs, PT GP 1    72887755089 HC GAIT TRAINING EA 15 MIN 6/9/2025 Cait Tubbs, PT GP 1            PT G-Codes  Outcome Measure Options: AM-PAC 6 Clicks Basic Mobility (PT)  AM-PAC 6 Clicks Score (PT): 17       Cait Tubbs, PT  6/9/2025    "

## 2025-06-09 NOTE — PLAN OF CARE
"Goal Outcome Evaluation:      Inpatient Palliative Care Consult received for Goals of Care Discussion/ACP, Hospice Referral/Discussion    Pt is currently CODE STATUS of No CPR, Limited Support --No Intubation.  She does not have an Advance Directive in the EMR    Pt is a Readmission from admission 5/12/2025--5/16/2025 for Chronic Hypoxic Hypercapnic Respiratory Failure with Acute COPD exacerbation.   Documentation revealed Goals of Care discussion was held during this admission with pt deciding on Code Status of DNR/DNI.  \"If worsening or decline (pt)  would consider hospice services\".     Admitted via ED with C/O SOA, Confusion.  Palliative Care Nurse attempted to visit pt this afternoon however , pt had just requested use of BSC.   PC will return in am to visit with pt.                                          "

## 2025-06-09 NOTE — THERAPY EVALUATION
Patient Name: Gabi Dudley  : 1945    MRN: [id number]                              Today's Date: 2025       Admit Date: 2025    Visit Dx:     ICD-10-CM ICD-9-CM   1. COPD exacerbation  J44.1 491.21   2. Metabolic encephalopathy  G93.41 348.31     Patient Active Problem List   Diagnosis    Adrenal adenoma    Anxiety    Chronic fatigue    Fibromyalgia    Hyperlipidemia    Essential hypertension    Insomnia    Osteoarthritis of knee    Osteoporosis    Pulmonary emphysema    Simple renal cyst    Tension headache    Vitamin D deficiency    Diverticulosis    Inversion of nipple    Paroxysmal atrial fibrillation    Coronary artery disease involving native coronary artery of native heart without angina pectoris    Autonomic dysfunction    Gastroesophageal reflux disease without esophagitis    Urge incontinence    Erythrasma    Compression fracture of T5 vertebra    Lung nodule    COPD (chronic obstructive pulmonary disease)    Overweight (BMI 25.0-29.9)    Acute on chronic respiratory failure with hypoxia    Acute on chronic respiratory failure with hypoxia and hypercapnia    Iron deficiency anemia    Impaired mobility and ADLs    Acute respiratory failure with hypoxia and hypercapnia    Aspiration pneumonia of right lower lobe    Partial small bowel obstruction    Moderate malnutrition    Bowel obstruction    Enteritis    Diverticulitis    Elevated troponin    Chronic combined systolic and diastolic CHF (congestive heart failure)    NSTEMI (non-ST elevated myocardial infarction)    COPD exacerbation    Altered mental status    Altered mental status, unspecified     Past Medical History:   Diagnosis Date    Adrenal adenoma     Anemia     Arrhythmia     Asthma     Atrial fibrillation     Back pain     Benign colonic polyp     Benign tumor of adrenal gland     Cataract     bilateral    Cholelithiasis     Chronic bronchitis     Chronic bronchitis with COPD (chronic obstructive pulmonary disease)     COPD  (chronic obstructive pulmonary disease) 2005    Coronary artery disease     Depression     Diverticulosis Years ago    Elevated cholesterol     Environmental and seasonal allergies     Fibromyalgia     Fibromyalgia, primary Sometime in the 90s    Gastritis     Generalized anxiety disorder     GERD (gastroesophageal reflux disease)     H/O mammogram     Headache Years ago    Hemorrhoids     History of blood transfusion 1985    History of blood transfusion 1985    History of echocardiogram     History of endometriosis     History of nuclear stress test     Hospitalization or health care facility admission within last 6 months     5 times between 11/2022-05/2023    Hypertension     IBS (irritable bowel syndrome)     Impaired functional mobility, balance, gait, and endurance     Impaired mobility     Inverted nipple     Kidney disease     Kidney stone     Liver cyst     Low back pain Years ago    Nodular radiologic density     Nodule of left lung     NSTEMI (non-ST elevated myocardial infarction) 9/24/2024    On home oxygen therapy     2 liters NC QHS    Osteoarthritis     Osteopenia Several years ago    Osteoporosis     PONV (postoperative nausea and vomiting)     Renal cyst     Sinus problem     2014    Sinusitis     Skin cancer     basal cell carcinoma    SOB (shortness of breath)     Tobacco use     Urinary frequency     Urinary tract infection Years ago    Frequent UTIs    Vitamin D deficiency     Wears glasses     Wears partial dentures     upper plate     Past Surgical History:   Procedure Laterality Date    APPENDECTOMY  1980s    CARDIAC CATHETERIZATION  2003    CATARACT EXTRACTION  2013    both eyes    CHOLECYSTECTOMY  2020    CHOLECYSTECTOMY WITH INTRAOPERATIVE CHOLANGIOGRAM N/A 10/30/2020    Procedure: CHOLECYSTECTOMY LAPAROSCOPIC INTRAOPERATIVE CHOLANGIOGRAPHY;  Surgeon: Sima Moses MD;  Location: [facility];  Service: General;  Laterality: N/A;    COLON SURGERY  2020    COLONOSCOPY  03/11/2013     COLONOSCOPY  06/21/2016    COLONOSCOPY N/A 07/24/2019    Procedure: COLONOSCOPY W/ COLD FORCEP POLYPECTOMIES; HOT SNARE POLYPECTOMIES; COLD SNARE POLYPECTOMY;  Surgeon: Goyo Nunez MD;  Location: [facility] ENDOSCOPY;  Service: Gastroenterology    COLONOSCOPY W/ BIOPSIES AND POLYPECTOMY      EXPLORATORY LAPAROTOMY N/A 11/23/2020    Procedure: colectomy, right, closure of enterotomy x 2, reduction of internal volvulus;  Surgeon: Sima Moses MD;  Location: [facility] OR;  Service: General;  Laterality: N/A;    EXPLORATORY LAPAROTOMY N/A 8/9/2023    Procedure: LAPAROTOMY EXPLORATORY WITH LYIS OF ADHESIONS AND  CENTRAL LINE INSERTION;  Surgeon: Bianca Isaac DO;  Location: [facility] OR;  Service: General;  Laterality: N/A;    HYSTERECTOMY  1980s    partial    LYSIS OF ABDOMINAL ADHESIONS      TONSILLECTOMY  1987    UPPER GASTROINTESTINAL ENDOSCOPY  01/13/2015    VAGINAL DELIVERY      x2      General Information       Row Name 06/09/25 1331          OT Time and Intention    Document Type evaluation  -DB     Mode of Treatment occupational therapy  -DB       Row Name 06/09/25 1339          General Information    Patient Profile Reviewed yes  -DB     Prior Level of Function min assist:;mod assist:;ADL's  -DB     Existing Precautions/Restrictions fall;oxygen therapy device and L/min  -DB     Barriers to Rehab medically complex;previous functional deficit;cognitive status  -DB       Row Name 06/09/25 1336          Occupational Profile    Reason for Services/Referral (Occupational Profile) ADL decline  -DB       Row Name 06/09/25 133          Living Environment    Current Living Arrangements home  -DB     People in Home child(ashely), adult  -DB       Row Name 06/09/25 1332          Cognition    Orientation Status (Cognition) oriented to;person;place  -DB       Row Name 06/09/25 1332          Safety Issues/Impairments Affecting Functional Mobility    Safety Issues Affecting Function (Mobility) ability to follow  commands;awareness of need for assistance;insight into deficits/self-awareness;judgment;positioning of assistive device;problem-solving;safety precaution awareness;safety precautions follow-through/compliance;sequencing abilities  -DB     Impairments Affecting Function (Mobility) balance;cognition;endurance/activity tolerance;coordination;strength  -DB     Cognitive Impairments, Mobility Safety/Performance attention;awareness, need for assistance;impulsivity;insight into deficits/self-awareness;judgment;problem-solving/reasoning;safety precaution awareness;safety precaution follow-through;sequencing abilities  -DB               User Key  (r) = Recorded By, (t) = Taken By, (c) = Cosigned By      Initials Name Provider Type    DB Nicholas Fields OT Occupational Therapist                     Mobility/ADL's       Row Name 06/09/25 1346          Bed Mobility    Supine-Sit [place] (Bed Mobility) standby assist  -DB     Sit-Supine [place] (Bed Mobility) standby assist  -DB     Assistive Device (Bed Mobility) bed rails;head of bed elevated  -DB       Row Name 06/09/25 1346          Transfers    Transfers sit-stand transfer  -DB       Row Name 06/09/25 1346          Sit-Stand Transfer    Sit-Stand [place] (Transfers) contact guard;verbal cues  -DB     Assistive Device (Sit-Stand Transfers) walker, front-wheeled  -DB       Row Name 06/09/25 1346          Functional Mobility    Functional Mobility- Ind. Level contact guard assist  -DB     Functional Mobility- Device walker, front-wheeled  -DB     Functional Mobility-Distance (Feet) 20  -DB     Patient was able to Ambulate yes  -DB       Row Name 06/09/25 1346          Activities of Daily Living    BADL Assessment/Intervention bathing;upper body dressing;lower body dressing;grooming;feeding;toileting  -DB       Row Name 06/09/25 1346          Bathing Assessment/Intervention    [place] Level (Bathing) bathing skills;moderate assist (50% patient effort)   -DB       Row Name 06/09/25 1346          Upper Body Dressing Assessment/Training    [place] Level (Upper Body Dressing) upper body dressing skills;minimum assist (75% patient effort)  -DB       Row Name 06/09/25 1346          Lower Body Dressing Assessment/Training    [place] Level (Lower Body Dressing) lower body dressing skills;minimum assist (75% patient effort)  -DB       Row Name 06/09/25 1346          Grooming Assessment/Training    [place] Level (Grooming) grooming skills;set up  -DB       Row Name 06/09/25 1346          Self-Feeding Assessment/Training    [place] Level (Feeding) feeding skills;set up  -DB       Row Name 06/09/25 1346          Toileting Assessment/Training    [place] Level (Toileting) toileting skills;minimum assist (75% patient effort)  -DB               User Key  (r) = Recorded By, (t) = Taken By, (c) = Cosigned By      Initials Name Provider Type    DB Nicholas Fields, OT Occupational Therapist                   Obj/Interventions       Row Name 06/09/25 1348          Range of Motion Comprehensive    General Range of Motion bilateral upper extremity ROM WFL  -DB       [facility] Name 06/09/25 1348          Strength Comprehensive (MMT)    General Manual Muscle Testing (MMT) Assessment upper extremity strength deficits identified  -DB       [facility] Name 06/09/25 1348          Upper Extremity (Manual Muscle Testing)    Comment, MMT: Upper Extremity BUEs grossly 3+/5  -DB               User Key  (r) = Recorded By, (t) = Taken By, (c) = Cosigned By      Initials Name Provider Type    DB Nicholas Fields, OT Occupational Therapist                   Goals/Plan       Row Name 06/09/25 1354          Bed Mobility Goal 1 (OT)    Activity/Assistive Device (Bed Mobility Goal 1, OT) bed mobility activities, all  -DB     [place] Level/Cues Needed (Bed Mobility Goal 1, OT) independent  -DB     Time Frame (Bed Mobility Goal 1, OT) by discharge  -DB     Progress/Outcomes (Bed  Mobility Goal 1, OT) goal ongoing  -DB       Row Name 06/09/25 1354          Transfer Goal 1 (OT)    Activity/Assistive Device (Transfer Goal 1, OT) toilet  -DB     [place] Level/Cues Needed (Transfer Goal 1, OT) modified independence  -DB     Time Frame (Transfer Goal 1, OT) by discharge  -DB     Progress/Outcome (Transfer Goal 1, OT) goal ongoing  -DB       Row Name 06/09/25 1354          Toileting Goal 1 (OT)    Activity/Device (Toileting Goal 1, OT) toileting skills, all  -DB     [place] Level/Cues Needed (Toileting Goal 1, OT) standby assist  -DB     Time Frame (Toileting Goal 1, OT) by discharge  -DB     Progress/Outcome (Toileting Goal 1, OT) goal ongoing  -DB       Row Name 06/09/25 1354          Grooming Goal 1 (OT)    Activity/Device (Grooming Goal 1, OT) grooming skills, all  -DB     [place] (Grooming Goal 1, OT) modified independence  -DB     Time Frame (Grooming Goal 1, OT) by discharge  -DB     Progress/Outcome (Grooming Goal 1, OT) goal ongoing  -DB       Row Name 06/09/25 1354          Strength Goal 1 (OT)    Strength Goal 1 (OT) Pt to tolerate resistance band exercises to increase strength and endurance needed for ADL mgmt  -DB     Time Frame (Strength Goal 1, OT) long term goal (LTG)  -DB     Progress/Outcome (Strength Goal 1, OT) goal ongoing  -DB       Row Name 06/09/25 1354          Therapy Assessment/Plan (OT)    Planned Therapy Interventions (OT) activity tolerance training;adaptive equipment training;BADL retraining;functional balance retraining;occupation/activity based interventions;ROM/therapeutic exercise;strengthening exercise;transfer/mobility retraining;patient/caregiver education/training  -DB               User Key  (r) = Recorded By, (t) = Taken By, (c) = Cosigned By      Initials Name Provider Type    Nicholas Elaine OT Occupational Therapist                   Clinical Impression       Row Name 06/09/25 0220          Pain Assessment    Pretreatment Pain  "Rating 0/10 - no pain  -DB     Posttreatment Pain Rating 0/10 - no pain  -DB       Row Name 06/09/25 1348          Plan of Care Review    Plan of Care Reviewed With patient  -DB     Progress improving  -DB     Outcome Evaluation OT eval completed. Pt alert and oriented to self. Pt supine in bed upon OT arrival. Pt reports she lives with daughter and her daughter provides min to mod A with ADLs. Pt able to complete supine to sit EOB with CGA. Pt reports increased dizziness and requested to return sidelying. Once dizziness resolved, pt able to return to EOB. Pt performed STS with CGA and RW. Pt able to complete transfer to BSC with CGA. Pt performed toileting tasks with min A for clothing mgmt. Pt then able to complete fxl mobility x20ft with CGA and RW. Pt requested to return supine due to having the \"trembles.\" Pt left sidelying with needs in reach and bed alarm activated. Pt to benefit from skilled OT services to address strength, endurance, fxl mobility, transfers, safety awareness, and ADL mgmt.  -DB       Row Name 06/09/25 1346          Therapy Assessment/Plan (OT)    Criteria for Skilled Therapeutic Interventions Met (OT) yes;skilled treatment is necessary  -DB     Therapy Frequency (OT) 2 times/wk  -DB       Row Name 06/09/25 1346          Therapy Plan Review/Discharge Plan (OT)    Anticipated Discharge Disposition (OT) skilled nursing facility;home with home health  -DB       Row Name 06/09/25 1348          Vital Signs    O2 Delivery Pre Treatment supplemental O2  -DB     O2 Delivery Intra Treatment supplemental O2  -DB     O2 Delivery Post Treatment supplemental O2  -DB     Pre Patient Position Sitting  -DB     Intra Patient Position Standing  -DB     Post Patient Position Side Lying  -DB       Row Name 06/09/25 1348          Positioning and Restraints    Pre-Treatment Position in bed  -DB     Post Treatment Position bed  -DB     In Bed side lying right;call light within reach;encouraged to call for " assist;exit alarm on  -DB               User Key  (r) = Recorded By, (t) = Taken By, (c) = Cosigned By      Initials Name Provider Type    Nicholas Elaine, RYAN Occupational Therapist                   Outcome Measures       Row Name 06/09/25 1355          How much help from another is currently needed...    Putting on and taking off regular lower body clothing? 2  -DB     Bathing (including washing, rinsing, and drying) 3  -DB     Toileting (which includes using toilet bed pan or urinal) 3  -DB     Putting on and taking off regular upper body clothing 3  -DB     Taking care of personal grooming (such as brushing teeth) 3  -DB     Eating meals 3  -DB     AM-PAC 6 Clicks Score (OT) 17  -DB       Row Name 06/09/25 1128 06/09/25 0803       How much help from another person do you currently need...    Turning from your back to your side while in flat bed without using bedrails? 3  -JR 3  -CC    Moving from lying on back to sitting on the side of a flat bed without bedrails? 3  -JR 3  -CC    Moving to and from a bed to a chair (including a wheelchair)? 3  -JR 3  -CC    Standing up from a chair using your arms (e.g., wheelchair, bedside chair)? 3  -JR 3  -CC    Climbing 3-5 steps with a railing? 2  -JR 2  -CC    To walk in hospital room? 3  -JR 2  -CC    AM-PAC 6 Clicks Score (PT) 17  -JR 16  -CC    Highest Level of Mobility Goal Stand (1 or More Minutes)-5  -JR Stand (1 or More Minutes)-5  -CC      Row Name 06/09/25 0500          How much help from another person do you currently need...    Turning from your back to your side while in flat bed without using bedrails? 3  -ARABELLA     Moving from lying on back to sitting on the side of a flat bed without bedrails? 3  -ARABELLA     Moving to and from a bed to a chair (including a wheelchair)? 3  -ARABELLA     Standing up from a chair using your arms (e.g., wheelchair, bedside chair)? 3  -ARABELLA     Climbing 3-5 steps with a railing? 2  -ARABELLA     To walk in hospital room? 3  -ARABELLA     AM-PAC  6 Clicks Score (PT) 17  -ARABELLA     Highest Level of Mobility Goal Stand (1 or More Minutes)-5  -ARABELLA       Row Name 06/09/25 1355 06/09/25 1128       Functional Assessment    Outcome Measure Options AM-PAC 6 Clicks Daily Activity (OT)  -DB AM-PAC 6 Clicks Basic Mobility (PT)  -JR              User Key  (r) = Recorded By, (t) = Taken By, (c) = Cosigned By      Initials Name Provider Type    JR Cait Tubbs, PT Physical Therapist    Liberty Levya RN Registered Nurse    Nicholas Elaine, RYAN Occupational Therapist    Nicolle Garcia RN Registered Nurse                    Occupational Therapy Education       Title: PT OT SLP Therapies (In Progress)       Topic: Occupational Therapy (In Progress)       Point: ADL training (Done)       Learning Progress Summary            Patient Acceptance, E,TB, VU,NR by  at 6/9/2025 1355    Comment: ADL retraining                                      User Key       Initials Effective Dates Name Provider Type Discipline    DB 07/06/23 -  Nicholas Fields, OT Occupational Therapist OT                  OT Recommendation and Plan  Planned Therapy Interventions (OT): activity tolerance training, adaptive equipment training, BADL retraining, functional balance retraining, occupation/activity based interventions, ROM/therapeutic exercise, strengthening exercise, transfer/mobility retraining, patient/caregiver education/training  Therapy Frequency (OT): 2 times/wk  Plan of Care Review  Plan of Care Reviewed With: patient  Progress: improving  Outcome Evaluation: OT eval completed. Pt alert and oriented to self. Pt supine in bed upon OT arrival. Pt reports she lives with daughter and her daughter provides min to mod A with ADLs. Pt able to complete supine to sit EOB with CGA. Pt reports increased dizziness and requested to return sidelying. Once dizziness resolved, pt able to return to EOB. Pt performed STS with CGA and RW. Pt able to complete transfer to BSC with CGA. Pt  "performed toileting tasks with min A for clothing mgmt. Pt then able to complete fxl mobility x20ft with CGA and RW. Pt requested to return supine due to having the \"trembles.\" Pt left sidelying with needs in reach and bed alarm activated. Pt to benefit from skilled OT services to address strength, endurance, fxl mobility, transfers, safety awareness, and ADL mgmt.     Time Calculation:   Evaluation Complexity (OT)  Review Occupational Profile/Medical/Therapy History Complexity: expanded/moderate complexity  Assessment, Occupational Performance/Identification of Deficit Complexity: 3-5 performance deficits  Clinical Decision Making Complexity (OT): detailed assessment/moderate complexity  Overall Complexity of Evaluation (OT): moderate complexity     Time Calculation- OT       Row Name 06/09/25 1356 06/09/25 1128          Time Calculation- OT    OT Start Time 1131  -DB --     OT Received On 06/09/25  -DB --     OT Goal Re-Cert Due Date 06/19/25  -DB --        Timed Charges    16726 - Gait Training Minutes  -- 10  -JR        Untimed Charges    OT Eval/Re-eval Minutes 39  -DB --        Total Minutes    Timed Charges Total Minutes -- 10  -JR     Untimed Charges Total Minutes 39  -DB --      Total Minutes 39  -DB 10  -JR               User Key  (r) = Recorded By, (t) = Taken By, (c) = Cosigned By      Initials Name Provider Type    Cait Ruvalcaba, PT Physical Therapist    DB Nicholas Fields OT Occupational Therapist                  Therapy Charges for Today       Code Description Service Date Service Provider Modifiers Qty    07411327760 HC OT EVAL MOD COMPLEXITY 3 6/9/2025 Nicholas Fields OT GO 1                 Nicholas Fields OT  6/9/2025  "

## 2025-06-09 NOTE — PLAN OF CARE
"Goal Outcome Evaluation:  Plan of Care Reviewed With: patient        Progress: improving  Outcome Evaluation: OT eval completed. Pt alert and oriented to self. Pt supine in bed upon OT arrival. Pt reports she lives with daughter and her daughter provides min to mod A with ADLs. Pt able to complete supine to sit EOB with CGA. Pt reports increased dizziness and requested to return sidelying. Once dizziness resolved, pt able to return to EOB. Pt performed STS with CGA and RW. Pt able to complete transfer to BSC with CGA. Pt performed toileting tasks with min A for clothing mgmt. Pt then able to complete fxl mobility x20ft with CGA and RW. Pt requested to return supine due to having the \"trembles.\" Pt left sidelying with needs in reach and bed alarm activated. Pt to benefit from skilled OT services to address strength, endurance, fxl mobility, transfers, safety awareness, and ADL mgmt.    Anticipated Discharge Disposition (OT): skilled nursing facility, home with home health                        "

## 2025-06-09 NOTE — DISCHARGE PLACEMENT REQUEST
"STR Referral     Aixa Dudley \"Eileen\" (80 y.o. Female)       Date of Birth   1945    Social Security Number       Address   [address] [place], [place] [address]    Home Phone   [phone]    MRN   [id number]       Mu-ism   Nondenominational    Marital Status                               Admission Date   6/7/2025    Admission Type   Emergency    Admitting Provider   Lucila Juan DO    Attending Provider   Milton Urban DO    Department, Room/Bed   [facility] TELEMETRY 3, 318/1       Discharge Date       Discharge Disposition       Discharge Destination                                 Attending Provider: Milton Urban DO    Allergies: Ativan [Lorazepam], Doxycycline    Isolation: None   Infection: None   Code Status: No CPR    Ht: 165.1 cm (65\")   Wt: 59.4 kg (131 lb)    Admission Cmt: None   Principal Problem: Acute on chronic respiratory failure with hypoxia and hypercapnia [J96.21,J96.22]                   Active Insurance as of 6/7/2025       Primary Coverage       Payor Plan Insurance Group Employer/Plan Group    MEDICARE MEDICARE A & B        Payor Plan Address Payor Plan Phone Number Payor Plan Fax Number Effective Dates    [address] [phone]  2/1/2012 - None Entered    [facility] [id number]         Subscriber Name Subscriber Birth Date Member ID       AIXA DUDLEY 1945 [id number]               Secondary Coverage       Payor Plan Insurance Group Employer/Plan Group    [employer] MC SUP AAR HEALTH CARE OPTIONS        Payor Plan Address Payor Plan Phone Number Payor Plan Fax Number Effective Dates    [facility] [phone]  1/1/2016 - None Entered    [address]       [facility] [address]         Subscriber Name Subscriber Birth Date Member ID       AIXA DUDLEY 1945 [id number]                     Emergency Contacts        (Rel.) Home Phone Work Phone Mobile Phone    Malu Aguilar (Daughter) [phone] -- [phone] "                 History & Physical        Lucila JuanDO at 25 0540            [facility]   HISTORY AND PHYSICAL      Name:  Gabi Dudley   Age:  80 y.o.  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  History somewhat limited due to patient condition at this time.  Apparently she lives with daughter, had increasing confusion for the last day or so.  Weakness, more short of breath than typical.  Patient currently arousable, but encephalopathic.  No focal deficit.  She is currently on BiPAP.    In the ER she was on BiPAP, heart rate in the 55 range afebrile blood pressure 130/80.  CBC with a white count of 3800, hemoglobin 9 platelets 188.  CMP creatinine 0.49 with a sodium 146 CO2 of 44.  Glucose of 81.  Lactic acid 0.5.  VBG pH 7.29 with base of 204.  Urinalysis unremarkable.  Chest x-ray appears similar to x-ray from May, no acute opacity.  Head CT reportedly unremarkable.  Procalcitonin, UDS, respiratory panel pending.  Patient given DuoNeb, Solu-Medrol, and placed on BiPAP.  We were asked to admit    Review Of Systems:    All systems were reviewed and negative except as mentioned in history of presenting illness, assessment and plan.    Past Medical History: Patient  has a past medical history of Adrenal adenoma, Anemia, Arrhythmia, Asthma, Atrial fibrillation, Back pain, Benign colonic polyp,  Benign tumor of adrenal gland, Cataract, Cholelithiasis, Chronic bronchitis, Chronic bronchitis with COPD (chronic obstructive pulmonary disease), COPD (chronic obstructive pulmonary disease) (2005), Coronary artery disease, Depression, Diverticulosis (Years ago), Elevated cholesterol, Environmental and seasonal allergies, Fibromyalgia, Fibromyalgia, primary (Sometime in the 90s), Gastritis, Generalized anxiety disorder, GERD (gastroesophageal reflux disease), H/O mammogram, Headache (Years ago), Hemorrhoids, History of blood transfusion (1985), History of blood transfusion (1985), History of echocardiogram, History of endometriosis, History of nuclear stress test, Hospitalization or health care facility admission within last 6 months, Hypertension, IBS (irritable bowel syndrome), Impaired functional mobility, balance, gait, and endurance, Impaired mobility, Inverted nipple, Kidney disease, Kidney stone, Liver cyst, Low back pain (Years ago), Nodular radiologic density, Nodule of left lung, NSTEMI (non-ST elevated myocardial infarction) (9/24/2024), On home oxygen therapy, Osteoarthritis, Osteopenia (Several years ago), Osteoporosis, PONV (postoperative nausea and vomiting), Renal cyst, Sinus problem, Sinusitis, Skin cancer, SOB (shortness of breath), Tobacco use, Urinary frequency, Urinary tract infection (Years ago), Vitamin D deficiency, Wears glasses, and Wears partial dentures.    Past Surgical History: Patient  has a past surgical history that includes Appendectomy (1980s); Tonsillectomy (1987); Colonoscopy w/ biopsies and polypectomy; Colonoscopy (03/11/2013); Colonoscopy (06/21/2016); Upper gastrointestinal endoscopy (01/13/2015); Vaginal delivery; Colonoscopy (N/A, 07/24/2019); Cataract extraction (2013); Hysterectomy (1980s); Cardiac catheterization (2003); Abdominal adhesion surgery; cholecystectomy with intraoperative cholangiogram (N/A, 10/30/2020); Exploratory Laparotomy (N/A, 11/23/2020);  Cholecystectomy (2020); Colon surgery (2020); and Exploratory Laparotomy (N/A, 8/9/2023).    Social History: Patient  reports that she quit smoking about 4 years ago. Her smoking use included cigarettes. She started smoking about 34 years ago. She has a 7.5 pack-year smoking history. She has been exposed to tobacco smoke. She has never used smokeless tobacco. She reports that she does not drink alcohol and does not use drugs.    Family History:  Patient's family history has been reviewed and found to be noncontributory.     Allergies:      Ativan [lorazepam] and Doxycycline    Home Medications:    Prior to Admission Medications       Prescriptions Last Dose Informant Patient Reported? Taking?    albuterol (PROVENTIL) (2.5 MG/3ML) 0.083% nebulizer solution   No No    Take 2.5 mg by nebulization Every 4 (Four) Hours As Needed for Wheezing.    albuterol sulfate  (90 Base) MCG/ACT inhaler   No No    Inhale 2 puffs Every 4 (Four) Hours As Needed for Wheezing.    amiodarone (PACERONE) 200 MG tablet   No No    Take 1 tablet by mouth Daily.    apixaban (Eliquis) 5 MG tablet tablet   No No    Take 1 tablet by mouth Every 12 (Twelve) Hours.    atorvastatin (Lipitor) 40 MG tablet   No No    Take 1 tablet by mouth Daily.    benzonatate (TESSALON) 100 MG capsule   No No    Take 1 capsule by mouth 3 (Three) Times a Day As Needed for Cough.    Budeson-Glycopyrrol-Formoterol (Breztri Aerosphere) 160-9-4.8 MCG/ACT aerosol inhaler   No No    Inhale 2 puffs 2 (Two) Times a Day. Rinse mouth out after use    clotrimazole-betamethasone (Lotrisone) 1-0.05 % cream   No No    Apply 1 Application topically to the appropriate area as directed 2 (Two) Times a Day.    diazePAM (Valium) 5 MG tablet   No No    Take 1 tablet by mouth Every 8 (Eight) Hours As Needed for Anxiety.    Diclofenac Sodium (VOLTAREN) 1 % gel gel   No No    Apply 4 g topically to the appropriate area as directed 4 (Four) Times a Day As Needed (pain).    DULoxetine  (CYMBALTA) 60 MG capsule   No No    TAKE 1 CAPSULE BY MOUTH 2 (TWO) TIMES A DAY.    fluticasone (FLONASE) 50 MCG/ACT nasal spray   No No    Administer 2 sprays into the nostril(s) as directed by provider Daily.    furosemide (LASIX) 20 MG tablet   No No    Take 1 tablet by mouth Daily As Needed (SOA-edema- Greater than 2 lb weight gain) for up to 30 days.    ipratropium-albuterol (DUO-NEB) 0.5-2.5 mg/3 ml nebulizer   No No    USE 3 mL VIA NEBULIZER EVERY 4 HOURS AS NEEDED FOR WHEEZING    levocetirizine (XYZAL) 5 MG tablet   Yes No    Take 1 tablet by mouth Every Evening.    Patient not taking:  Reported on 5/29/2025    losartan (Cozaar) 25 MG tablet   No No    Take 1 tablet by mouth Daily.    meclizine (ANTIVERT) 25 MG tablet   No No    Take 1 tablet by mouth 3 (Three) Times a Day As Needed for Dizziness.    metoprolol succinate XL (Toprol XL) 100 MG 24 hr tablet   No No    Take 1 tablet by mouth Daily.    naloxone (NARCAN) 4 MG/0.1ML nasal spray   Yes No    Administer 1 spray into the nostril(s) as directed by provider.    Patient not taking:  Reported on 5/29/2025    O2 (OXYGEN)   Yes No    Inhale 4 L/min As Needed.    ondansetron ODT (ZOFRAN-ODT) 8 MG disintegrating tablet   No No    Place 1 tablet on the tongue Every 8 (Eight) Hours As Needed for Nausea.    oxyCODONE-acetaminophen (PERCOCET) 7.5-325 MG per tablet   No No    Take 1 tablet by mouth Every 4 (Four) Hours As Needed for Moderate Pain or Severe Pain for up to 5 doses.    pantoprazole (PROTONIX) 40 MG EC tablet   No No    Take 1 tablet by mouth Daily.    prochlorperazine (COMPAZINE) 5 MG tablet   No No    Take 1 tablet by mouth Every 6 (Six) Hours As Needed for Nausea or Vomiting.    saline (AYR) gel nasal gel   No No    Apply 1 Application topically to the appropriate area as directed As Needed (nasal dryness nose bleeds).    sertraline (ZOLOFT) 100 MG tablet   No No    TAKE 1 & 1/2 TABLETS BY MOUTH DAILY    traZODone (DESYREL) 50 MG tablet   Yes No     "Take 1 tablet by mouth Every Night.          ED Medications:    Medications   sodium chloride 0.9 % flush 10 mL (has no administration in time range)   methylPREDNISolone sodium succinate (SOLU-Medrol) injection 125 mg (125 mg Intravenous Given 6/7/25 9667)   ipratropium-albuterol (DUO-NEB) nebulizer solution 3 mL (3 mL Nebulization Given 6/7/25 4199)   ipratropium-albuterol (DUO-NEB) nebulizer solution 6 mL (6 mL Nebulization Given 6/7/25 7546)     Vital Signs:  Temp:  [97.6 °F (36.4 °C)] 97.6 °F (36.4 °C)  Heart Rate:  [53-55] 53  Resp:  [18] 18  BP: (117-136)/(59-90) 130/90        06/07/25  0328   Weight: 59.4 kg (131 lb)     Body mass index is 21.8 kg/m².    Physical Exam:     Most recent vital Signs: /90   Pulse 53   Temp 97.6 °F (36.4 °C) (Oral)   Resp 18   Ht 165.1 cm (65\")   Wt 59.4 kg (131 lb)   SpO2 99%   BMI 21.80 kg/m²     Physical Exam  Constitutional:       Appearance: She is ill-appearing.   HENT:      Mouth/Throat:      Mouth: Mucous membranes are dry.   Eyes:      Extraocular Movements: Extraocular movements intact.      Pupils: Pupils are equal, round, and reactive to light.   Cardiovascular:      Rate and Rhythm: Regular rhythm. Bradycardia present.      Heart sounds: No murmur heard.     No gallop.   Pulmonary:      Effort: Respiratory distress present.      Breath sounds: Wheezing and rhonchi present.   Abdominal:      General: There is no distension.      Tenderness: There is no abdominal tenderness.   Musculoskeletal:      Right lower leg: No edema.      Left lower leg: No edema.   Skin:     General: Skin is dry.      Capillary Refill: Capillary refill takes less than 2 seconds.   Neurological:      Mental Status: She is disoriented.      Motor: Weakness present.         Laboratory data:    I have reviewed the labs done in the emergency room.    Results from last 7 days   Lab Units 06/07/25  0314   SODIUM mmol/L 146*   POTASSIUM mmol/L 4.2   CHLORIDE mmol/L 97*   CO2 mmol/L 44.0* "   BUN mg/dL 22.0   CREATININE mg/dL 0.49*   CALCIUM mg/dL 9.5   BILIRUBIN mg/dL 0.2   ALK PHOS U/L 86   ALT (SGPT) U/L 34*   AST (SGOT) U/L 34*   GLUCOSE mg/dL 81     Results from last 7 days   Lab Units 06/07/25  0314   WBC 10*3/mm3 3.81   HEMOGLOBIN g/dL 9.1*   HEMATOCRIT % 32.7*   PLATELETS 10*3/mm3 188                             Results from last 7 days   Lab Units 06/07/25  0402   COLOR UA  Yellow   GLUCOSE UA  Negative   KETONES UA  Negative   BLOOD UA  Negative   LEUKOCYTES UA  Negative   PH, URINE  6.0   BILIRUBIN UA  Negative   UROBILINOGEN UA  1.0 E.U./dL       Pain Management Panel          Latest Ref Rng & Units 8/11/2023   Pain Management Panel   Creatinine, Urine mg/dL 105.2        EKG:      Pending    Radiology:    No radiology results for the last 3 days    Assessment:    COPD exacerbation  Acute on chronic hypoxic/hypercapnic respiratory failure  Acute metabolic encephalopathy likely secondary to #2  History of paroxysmal atrial fibrillation/flutter  History of diastolic/systolic CHF  Anxiety depression  History of medical noncompliance    Plan:    Admit as inpatient    Respiratory failure/COPD/encephalopathy:  Patient on sedating medications, in setting of end-stage COPD and advanced hypercapnic respiratory failure with history of noncompliance with AVAPS.  No obvious infectious source, will check respiratory panel and procalcitonin.  Placed on bronchodilators, Solu-Medrol, continue use of BiPAP.  Anticipate mental status will improve with treatment of CO2 narcosis    Atrial fibrillation/flutter/combined CHF:  Check proBNP.  Volume status appears fair on exam.  Continue with oral diuretic.  Continue with amiodarone and Eliquis.  Telemetry monitoring.    Impaired mobility and ADLs/anxiety/depression:  Overall prognosis fairly poor due to medical noncompliance and advanced lung disease.  Palliative care consultation recommended.    Further recommendation depend upon clinical course.  Per chart  review patient is a DNI/DNR.    Risk Assessment: High  DVT Prophylaxis: Apixaban  Code Status: DNR  Diet: Cardiac once awake    Advance Care Planning  ACP discussion was held with the patient during this visit. Patient does not have an advance directive, declines further assistance.           Lucila Juan DO  06/07/25  05:40 EDT    Dictated utilizing Dragon dictation.    Electronically signed by Lucila Juan DO at 06/07/25 0632       Current Facility-Administered Medications   Medication Dose Route Frequency Provider Last Rate Last Admin    acetaminophen (TYLENOL) tablet 650 mg  650 mg Oral Q4H PRN Lucila Juan DO   650 mg at 06/09/25 0320    Or    acetaminophen (TYLENOL) 160 MG/5ML oral solution 650 mg  650 mg Oral Q4H PRN Lucila Juan DO        Or    acetaminophen (TYLENOL) suppository 650 mg  650 mg Rectal Q4H PRN Lucila Juan DO        aluminum-magnesium hydroxide-simethicone (MAALOX MAX) 400-400-40 MG/5ML suspension 15 mL  15 mL Oral Q6H PRN Lucila Juan DO        amiodarone (PACERONE) tablet 200 mg  200 mg Oral Q24H Lucila Juan DO   200 mg at 06/09/25 0804    apixaban (ELIQUIS) tablet 5 mg  5 mg Oral Q12H Lucila Juan, DO   5 mg at 06/09/25 0804    arformoterol (BROVANA) nebulizer solution 15 mcg  15 mcg Nebulization BID - RT Lucila Juan DO   15 mcg at 06/09/25 0737    And    budesonide (PULMICORT) nebulizer solution 0.5 mg  0.5 mg Nebulization BID - RT Lucila Juan DO   0.5 mg at 06/09/25 0737    And    revefenacin (YUPELRI) nebulizer solution 175 mcg  175 mcg Nebulization Daily - RT Lucila Juan DO   175 mcg at 06/09/25 0737    atorvastatin (LIPITOR) tablet 40 mg  40 mg Oral Daily Lucila Juan DO   40 mg at 06/09/25 0804    benzonatate (TESSALON) capsule 100 mg  100 mg Oral TID PRN Lucila Juan, DO        sennosides-docusate (PERICOLACE) 8.6-50 MG per tablet 2 tablet  2  tablet Oral BID PRN Lucila Juan DO   2 tablet at 06/08/25 0906    And    polyethylene glycol (MIRALAX) packet 17 g  17 g Oral Daily PRN Lucila Juan DO        And    bisacodyl (DULCOLAX) EC tablet 5 mg  5 mg Oral Daily PRN Lucila Juan DO        And    bisacodyl (DULCOLAX) suppository 10 mg  10 mg Rectal Daily PRN Lucila Juan DO        Diclofenac Sodium (VOLTAREN) 1 % gel 4 g  4 g Topical 4x Daily PRN Lucila Juan DO        DULoxetine (CYMBALTA) DR capsule 60 mg  60 mg Oral BID Lucila Juan DO   60 mg at 06/09/25 0804    furosemide (LASIX) tablet 20 mg  20 mg Oral Daily Lucila Juan DO   20 mg at 06/09/25 0804    ipratropium-albuterol (DUO-NEB) nebulizer solution 3 mL  3 mL Nebulization Q4H PRN Lucila Juan DO        losartan (COZAAR) tablet 25 mg  25 mg Oral Daily Lucila Juan DO   25 mg at 06/09/25 0804    methylPREDNISolone sodium succinate (SOLU-Medrol) injection 40 mg  40 mg Intravenous Q12H Lucila Juan DO   40 mg at 06/09/25 0500    metoprolol succinate XL (TOPROL-XL) 24 hr tablet 50 mg  50 mg Oral Daily Lucila Juan DO   50 mg at 06/09/25 0803    nitroglycerin (NITROSTAT) SL tablet 0.4 mg  0.4 mg Sublingual Q5 Min PRN Lucial Juan DO        ondansetron ODT (ZOFRAN-ODT) disintegrating tablet 4 mg  4 mg Oral Q6H PRN Lucila Juan DO        Or    ondansetron (ZOFRAN) injection 4 mg  4 mg Intravenous Q6H PRN Lucila Juan DO        oxyCODONE-acetaminophen (PERCOCET) 7.5-325 MG per tablet 1 tablet  1 tablet Oral Q8H PRN Lucila Juan DO   1 tablet at 06/09/25 0803    pantoprazole (PROTONIX) EC tablet 40 mg  40 mg Oral Daily Lucila Juan,    40 mg at 06/09/25 0803    QUEtiapine (SEROquel) tablet 25 mg  25 mg Oral Q6H PRN Milton Urban, DO   25 mg at 06/08/25 2034    sertraline (ZOLOFT) tablet 100 mg  100 mg Oral Daily Lucila Juan  Diego, DO   100 mg at 25 0804    sodium chloride 0.9 % flush 10 mL  10 mL Intravenous PRN KarLucila oneal Diego, DO        sodium chloride 0.9 % flush 10 mL  10 mL Intravenous Q12H Lucila Juanus, DO   10 mL at 25 0804    sodium chloride 0.9 % flush 10 mL  10 mL Intravenous PRN Karjm, Lucila Diego, DO        sodium chloride 0.9 % infusion 40 mL  40 mL Intravenous PRN Karjm, Lucila Diego, DO            Physician Progress Notes (most recent note)        Milton Urban DO at 25 1205              [facility]    Name:  Gabi Dudley   Age:  80 y.o.  Sex:  female  :  1945  MRN:  [id number]   Visit Number:  [id number]  Admission Date:  2025  Date Of Service:  25  Primary Care Physician:  Krystina Saunders DO     LOS: 2 days :    Chief Complaint:      weakness    Subjective:    2025: Discussed with daughter later in the day yesterday. Called again this morning.  Labs vitals have been stable thus far this admission.  DC plan pending.  Palliative discussion pending.  Continues to have difficulty with BiPAP tolerance. Participating in therapy. Weakness improved.    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  History somewhat limited due to patient condition at this time.  Apparently she lives with daughter, had increasing confusion for the last day or so.  Weakness, more short of breath than typical.  Patient currently arousable, but encephalopathic.  No focal deficit.  She is currently on BiPAP.     In the ER she was on BiPAP, heart rate in the 55 range afebrile blood pressure  130/80.  CBC with a white count of 3800, hemoglobin 9 platelets 188.  CMP creatinine 0.49 with a sodium 146 CO2 of 44.  Glucose of 81.  Lactic acid 0.5.  VBG pH 7.29 with base of 204.  Urinalysis unremarkable.  Chest x-ray appears similar to x-ray from May, no acute opacity.  Head CT reportedly unremarkable.  Procalcitonin, UDS, respiratory panel pending.  Patient given DuoNeb, Solu-Medrol, and placed on BiPAP.  We were asked to admit  Edited by: Milton Urban DO at 6/9/2025 1204     Review of Systems:     All systems were reviewed and negative except as mentioned in subjective, assessment and plan.    Vital Signs:    Temp:  [98 °F (36.7 °C)-99.1 °F (37.3 °C)] 98.4 °F (36.9 °C)  Heart Rate:  [65-69] 69  Resp:  [16-22] 16  BP: (117-173)/(56-83) 147/76    Intake and output:    I/O last 3 completed shifts:  In: 120 [P.O.:120]  Out: 300 [Urine:300]  I/O this shift:  In: 240 [P.O.:240]  Out: -     Physical Examination:    Constitutional: No acute distress, awake, alert  HENT: NCAT, mucous membranes moist  Respiratory: Wheeze and rhonchi bilaterally, respiratory effort increased  Cardiovascular: Heart slow but regular, no murmurs, rubs, or gallops  Gastrointestinal: Positive bowel sounds, soft, nontender, nondistended  Musculoskeletal: No bilateral ankle edema, diffuse weakness  Psychiatric: Appropriate affect, cooperative  Neurologic: Oriented x3, speech clear  Skin: No rashes  Exam stable 6/9/25  Edited by: Milton Urban DO at 6/9/2025 1203     Laboratory results:    Results from last 7 days   Lab Units 06/09/25  0737 06/08/25  0707 06/07/25  0314   SODIUM mmol/L 143 145 146*   POTASSIUM mmol/L 3.9 3.9 4.2   CHLORIDE mmol/L 98 98 97*   CO2 mmol/L 35.3* 36.3* 44.0*   BUN mg/dL 28.0* 25.0* 22.0   CREATININE mg/dL 0.51* 0.56* 0.49*   CALCIUM mg/dL 9.2 9.5 9.5   BILIRUBIN mg/dL  --   --  0.2   ALK PHOS U/L  --   --  86   ALT (SGPT) U/L  --   --  34*   AST (SGOT) U/L  --   --  34*   GLUCOSE mg/dL 94 101* 81      Results from last 7 days   Lab Units 06/09/25  0737 06/08/25  0707 06/07/25  0314   WBC 10*3/mm3 7.50 6.84 3.81   HEMOGLOBIN g/dL 9.5* 8.5* 9.1*   HEMATOCRIT % 31.8* 28.9* 32.7*   PLATELETS 10*3/mm3 255 228 188                 Recent Labs     05/03/25  1613 05/12/25  0558 05/15/25  0651   PHART 7.350 7.410 7.424   BQQ0BSY 85.4* 74.5* 62.2*   PO2ART 74.2* 57.6* 164.0*   [id number] 47.1* 47.2* 40.7*   BASEEXCESS 18.4* 19.7* 14.0*      I have reviewed the patient's laboratory results.    Radiology results:    No radiology results from the last 24 hrs  I have reviewed the patient's radiology reports.    Medication Review:     I have reviewed the patient's active and prn medications.     Problem List:      Acute on chronic respiratory failure with hypoxia and hypercapnia      Assessment/Plan:    COPD exacerbation  Acute on chronic hypoxic/hypercapnic respiratory failure  Acute metabolic encephalopathy likely secondary to #2  History of paroxysmal atrial fibrillation/flutter  History of diastolic/systolic CHF  Anxiety depression  History of medical noncompliance     Plan:     Respiratory failure/COPD/encephalopathy:  CO2 narcosis improved with bipap steroids breathing treatment. Patient on sedating medications, in setting of end-stage COPD and advanced hypercapnic respiratory failure with history of noncompliance with AVAPS. Must spend time every day. Bipap. Add seroquel. Repeat VBG reviewed.     Atrial fibrillation/flutter/combined CHF:  Chronically elevated not far from baseline proBNP.  Volume status appears euvolemic on exam.  Continue with oral diuretic.  Continue with amiodarone and Eliquis.  Telemetry monitoring.     Impaired mobility and ADLs/anxiety/depression:  Overall prognosis fairly poor due to medical noncompliance and advanced lung disease.  Palliative care consultation recommended.     DVT Prophylaxis: Apixaban  Code Status: DNR/DNI  Diet: Cardiac   Disposition: palliative to see anticipate  STR in 1 to 2  days, patient agreeable, medically stable  Edited by: Milton Urban DO at 2025 1204           Milton Urban DO  25  12:05 EDT    Dictated utilizing Dragon dictation.        Electronically signed by Milton Urban DO at 25 1205          Physical Therapy Notes (most recent note)        Cait Tubbs, PT at 25 1337  Version 1 of 1         Patient Name: Gabi Dudley  : 1945    MRN: [id number]                              Today's Date: 2025       Admit Date: 2025    Visit Dx:     ICD-10-CM ICD-9-CM   1. COPD exacerbation  J44.1 491.21   2. Metabolic encephalopathy  G93.41 348.31     Patient Active Problem List   Diagnosis    Adrenal adenoma    Anxiety    Chronic fatigue    Fibromyalgia    Hyperlipidemia    Essential hypertension    Insomnia    Osteoarthritis of knee    Osteoporosis    Pulmonary emphysema    Simple renal cyst    Tension headache    Vitamin D deficiency    Diverticulosis    Inversion of nipple    Paroxysmal atrial fibrillation    Coronary artery disease involving native coronary artery of native heart without angina pectoris    Autonomic dysfunction    Gastroesophageal reflux disease without esophagitis    Urge incontinence    Erythrasma    Compression fracture of T5 vertebra    Lung nodule    COPD (chronic obstructive pulmonary disease)    Overweight (BMI 25.0-29.9)    Acute on chronic respiratory failure with hypoxia    Acute on chronic respiratory failure with hypoxia and hypercapnia    Iron deficiency anemia    Impaired mobility and ADLs    Acute respiratory failure with hypoxia and hypercapnia    Aspiration pneumonia of right lower lobe    Partial small bowel obstruction    Moderate malnutrition    Bowel obstruction    Enteritis    Diverticulitis    Elevated troponin    Chronic combined systolic and diastolic CHF (congestive heart failure)    NSTEMI (non-ST elevated myocardial infarction)    COPD exacerbation    Altered mental status    Altered  mental status, unspecified     Past Medical History:   Diagnosis Date    Adrenal adenoma     Anemia     Arrhythmia     Asthma     Atrial fibrillation     Back pain     Benign colonic polyp     Benign tumor of adrenal gland     Cataract     bilateral    Cholelithiasis     Chronic bronchitis     Chronic bronchitis with COPD (chronic obstructive pulmonary disease)     COPD (chronic obstructive pulmonary disease) 2005    Coronary artery disease     Depression     Diverticulosis Years ago    Elevated cholesterol     Environmental and seasonal allergies     Fibromyalgia     Fibromyalgia, primary Sometime in the 90s    Gastritis     Generalized anxiety disorder     GERD (gastroesophageal reflux disease)     H/O mammogram     Headache Years ago    Hemorrhoids     History of blood transfusion 1985    History of blood transfusion 1985    History of echocardiogram     History of endometriosis     History of nuclear stress test     Hospitalization or health care facility admission within last 6 months     5 times between 11/2022-05/2023    Hypertension     IBS (irritable bowel syndrome)     Impaired functional mobility, balance, gait, and endurance     Impaired mobility     Inverted nipple     Kidney disease     Kidney stone     Liver cyst     Low back pain Years ago    Nodular radiologic density     Nodule of left lung     NSTEMI (non-ST elevated myocardial infarction) 9/24/2024    On home oxygen therapy     2 liters NC QHS    Osteoarthritis     Osteopenia Several years ago    Osteoporosis     PONV (postoperative nausea and vomiting)     Renal cyst     Sinus problem     2014    Sinusitis     Skin cancer     basal cell carcinoma    SOB (shortness of breath)     Tobacco use     Urinary frequency     Urinary tract infection Years ago    Frequent UTIs    Vitamin D deficiency     Wears glasses     Wears partial dentures     upper plate     Past Surgical History:   Procedure Laterality Date    APPENDECTOMY  1980s    CARDIAC  CATHETERIZATION  2003    CATARACT EXTRACTION  2013    both eyes    CHOLECYSTECTOMY  2020    CHOLECYSTECTOMY WITH INTRAOPERATIVE CHOLANGIOGRAM N/A 10/30/2020    Procedure: CHOLECYSTECTOMY LAPAROSCOPIC INTRAOPERATIVE CHOLANGIOGRAPHY;  Surgeon: Sima Moses MD;  Location: [facility];  Service: General;  Laterality: N/A;    COLON SURGERY  2020    COLONOSCOPY  03/11/2013    COLONOSCOPY  06/21/2016    COLONOSCOPY N/A 07/24/2019    Procedure: COLONOSCOPY W/ COLD FORCEP POLYPECTOMIES; HOT SNARE POLYPECTOMIES; COLD SNARE POLYPECTOMY;  Surgeon: Goyo Nunez MD;  Location: [facility];  Service: Gastroenterology    COLONOSCOPY W/ BIOPSIES AND POLYPECTOMY      EXPLORATORY LAPAROTOMY N/A 11/23/2020    Procedure: colectomy, right, closure of enterotomy x 2, reduction of internal volvulus;  Surgeon: Sima Moses MD;  Location: [facility];  Service: General;  Laterality: N/A;    EXPLORATORY LAPAROTOMY N/A 8/9/2023    Procedure: LAPAROTOMY EXPLORATORY WITH LYIS OF ADHESIONS AND  CENTRAL LINE INSERTION;  Surgeon: Bianca Isaac DO;  Location: [facility];  Service: General;  Laterality: N/A;    HYSTERECTOMY  1980s    partial    LYSIS OF ABDOMINAL ADHESIONS      TONSILLECTOMY  1987    UPPER GASTROINTESTINAL ENDOSCOPY  01/13/2015    VAGINAL DELIVERY      x2      General Information       Row Name 06/09/25 1128          Physical Therapy Time and Intention    Document Type therapy note (daily note)  -     Mode of Treatment physical therapy  -       Row Name 06/09/25 1128          General Information    Patient Profile Reviewed yes  -               User Key  (r) = Recorded By, (t) = Taken By, (c) = Cosigned By      Initials Name Provider Type     Cait Tubbs PT Physical Therapist                   Mobility       Row Name 06/09/25 1128          Bed Mobility    Bed Mobility supine-sit;sit-supine  -JR     Supine-Sit [place] (Bed Mobility) standby assist  -JR     Sit-Supine [place] (Bed Mobility) standby  assist  -JR     Assistive Device (Bed Mobility) bed rails;head of bed elevated  -JR       Row Name 06/09/25 1128          Sit-Stand Transfer    Sit-Stand [place] (Transfers) contact guard;verbal cues  -     Assistive Device (Sit-Stand Transfers) walker, front-wheeled  -JR       Row Name 06/09/25 1128          Gait/Stairs (Locomotion)    [place] Level (Gait) contact guard;verbal cues  -     Assistive Device (Gait) walker, front-wheeled  -JR     Patient was able to Ambulate yes  -JR     Distance in Feet (Gait) 20  -JR     Deviations/Abnormal Patterns (Gait) festinating/shuffling  -JR     Bilateral Gait Deviations forward flexed posture;heel strike decreased  -               User Key  (r) = Recorded By, (t) = Taken By, (c) = Cosigned By      Initials Name Provider Type    Cait Ruvalcaba, PT Physical Therapist                   Obj/Interventions       Row Name 06/09/25 1128          Balance    Balance Assessment sitting static balance;sitting dynamic balance;standing static balance;standing dynamic balance  -     Static Sitting Balance standby assist  -     Dynamic Sitting Balance standby assist  -JR     Position, Sitting Balance unsupported;sitting edge of bed  -     Static Standing Balance standby assist  -JR     Dynamic Standing Balance standby assist;contact guard  -     Position/Device Used, Standing Balance walker, rolling;supported  -               User Key  (r) = Recorded By, (t) = Taken By, (c) = Cosigned By      Initials Name Provider Type    Cait Ruvalcaba, PT Physical Therapist                   Goals/Plan    No documentation.                  Clinical Impression       Row Name 06/09/25 1128          Pain    Pretreatment Pain Rating 0/10 - no pain  -     Posttreatment Pain Rating 0/10 - no pain  -JR     Pain Side/Orientation generalized  -       Row Name 06/09/25 1128          Plan of Care Review    Plan of Care Reviewed With patient  -     Progress improving  -      "Outcome Evaluation Patient demonstrated improving strength and balance as seen by increased gait distance and quality.  She required no more than stand by assistance to perform bed mobility.  She reports feeling dizzy and nauseated upon initial sitting.  She returns to sidelying and once she feels better she agrees to attempt walking.  She is able to walk 20 feet with RW and CGA before stating she has to sit down of she will fall down due to \"the tremblies\"  She then sat on the edge of the bed and is shaking in her arms and leg.  She is returned to right sidelying with call light in place.  Plan to continue PT per POC.  -JR       Row Name 06/09/25 1128          Positioning and Restraints    Pre-Treatment Position sitting in chair/recliner  -JR     Post Treatment Position bed  -JR     In Bed side lying right;call light within reach;encouraged to call for assist;notified nsg  -JR               User Key  (r) = Recorded By, (t) = Taken By, (c) = Cosigned By      Initials Name Provider Type    Cait Ruvalcaba, PT Physical Therapist                   Outcome Measures       Row Name 06/09/25 1128 06/09/25 0803       How much help from another person do you currently need...    Turning from your back to your side while in flat bed without using bedrails? 3  -JR 3  -CC    Moving from lying on back to sitting on the side of a flat bed without bedrails? 3  -JR 3  -CC    Moving to and from a bed to a chair (including a wheelchair)? 3  -JR 3  -CC    Standing up from a chair using your arms (e.g., wheelchair, bedside chair)? 3  -JR 3  -CC    Climbing 3-5 steps with a railing? 2  -JR 2  -CC    To walk in hospital room? 3  -JR 2  -CC    AM-PAC 6 Clicks Score (PT) 17  -JR 16  -CC    Highest Level of Mobility Goal Stand (1 or More Minutes)-5  -JR Stand (1 or More Minutes)-5  -CC      Row Name 06/09/25 0500          How much help from another person do you currently need...    Turning from your back to your side while in flat bed " without using bedrails? 3  -ARABELLA     Moving from lying on back to sitting on the side of a flat bed without bedrails? 3  -ARABELLA     Moving to and from a bed to a chair (including a wheelchair)? 3  -ARABELLA     Standing up from a chair using your arms (e.g., wheelchair, bedside chair)? 3  -ARABELLA     Climbing 3-5 steps with a railing? 2  -ARABELLA     To walk in hospital room? 3  -ARABELLA     AM-PAC 6 Clicks Score (PT) 17  -ARABELLA     Highest Level of Mobility Goal Stand (1 or More Minutes)-5  -ARABELLA       Row Name 06/09/25 1128          Functional Assessment    Outcome Measure Options AM-PAC 6 Clicks Basic Mobility (PT)  -               User Key  (r) = Recorded By, (t) = Taken By, (c) = Cosigned By      Initials Name Provider Type     Cait Tubbs, PT Physical Therapist    Liberty Leyva RN Registered Nurse    Nicolle Garcia RN Registered Nurse                                 Physical Therapy Education       Title: PT OT SLP Therapies (In Progress)       Topic: Physical Therapy (In Progress)       Point: Mobility training (Done)       Learning Progress Summary            Patient Acceptance, E, VU,NR by  at 6/8/2025 1025    Comment: Pt education for purpose of PT evaluation and pt stated she understood but then would forget why therapist was there.                      Point: Home exercise program (Not Started)       Learner Progress:  Not documented in this visit.              Point: Body mechanics (Done)       Learning Progress Summary            Patient Acceptance, E,TB, VU by  at 6/9/2025 1333    Comment: Posture with standing and gait                      Point: Precautions (Not Started)       Learner Progress:  Not documented in this visit.                              User Key       Initials Effective Dates Name Provider Type [facility] 08/22/23 -  Cait Tubbs, PT Physical Therapist PT    TW 06/16/21 -  Alice Laura, HANS Physical Therapist PT                  PT Recommendation and Plan     Progress: improving  Outcome  "Evaluation: Patient demonstrated improving strength and balance as seen by increased gait distance and quality.  She required no more than stand by assistance to perform bed mobility.  She reports feeling dizzy and nauseated upon initial sitting.  She returns to sidelying and once she feels better she agrees to attempt walking.  She is able to walk 20 feet with RW and CGA before stating she has to sit down of she will fall down due to \"the tremblies\"  She then sat on the edge of the bed and is shaking in her arms and leg.  She is returned to right sidelying with call light in place.  Plan to continue PT per POC.     Time Calculation:         PT Charges       Row Name 25 1128             Time Calculation    Start Time 1128  -JR      Stop Time 1153  -JR      Time Calculation (min) 25 min  -JR      PT Received On 25  -JR      PT Goal Re-Cert Due Date 25  -JR         Time Calculation- PT    Total Timed Code Minutes- PT 25 minute(s)  -JR         Timed Charges    01569 - Gait Training Minutes  10  -JR      83785 - PT Therapeutic Activity Minutes 15  -JR         Total Minutes    Timed Charges Total Minutes 25  -JR       Total Minutes 25  -JR                User Key  (r) = Recorded By, (t) = Taken By, (c) = Cosigned By      Initials Name Provider Type    JR Cait Tubbs, PT Physical Therapist                  Therapy Charges for Today       Code Description Service Date Service Provider Modifiers Qty    62983901516 HC PT THERAPEUTIC ACT EA 15 MIN 2025 Cait Tubbs, PT GP 1    59779257549 HC GAIT TRAINING EA 15 MIN 2025 Cait Tubbs, PT GP 1            PT G-Codes  Outcome Measure Options: AM-PAC 6 Clicks Basic Mobility (PT)  AM-PAC 6 Clicks Score (PT): 17       Cait Tubbs PT  2025      Electronically signed by Cait Tubbs PT at 25 1337          Occupational Therapy Notes (most recent note)        Nicholas Fields, OT at 25 1356          Patient Name: Gabi Dudley  : " 1945    MRN: [id number]                              Today's Date: 6/9/2025       Admit Date: 6/7/2025    Visit Dx:     ICD-10-CM ICD-9-CM   1. COPD exacerbation  J44.1 491.21   2. Metabolic encephalopathy  G93.41 348.31     Patient Active Problem List   Diagnosis    Adrenal adenoma    Anxiety    Chronic fatigue    Fibromyalgia    Hyperlipidemia    Essential hypertension    Insomnia    Osteoarthritis of knee    Osteoporosis    Pulmonary emphysema    Simple renal cyst    Tension headache    Vitamin D deficiency    Diverticulosis    Inversion of nipple    Paroxysmal atrial fibrillation    Coronary artery disease involving native coronary artery of native heart without angina pectoris    Autonomic dysfunction    Gastroesophageal reflux disease without esophagitis    Urge incontinence    Erythrasma    Compression fracture of T5 vertebra    Lung nodule    COPD (chronic obstructive pulmonary disease)    Overweight (BMI 25.0-29.9)    Acute on chronic respiratory failure with hypoxia    Acute on chronic respiratory failure with hypoxia and hypercapnia    Iron deficiency anemia    Impaired mobility and ADLs    Acute respiratory failure with hypoxia and hypercapnia    Aspiration pneumonia of right lower lobe    Partial small bowel obstruction    Moderate malnutrition    Bowel obstruction    Enteritis    Diverticulitis    Elevated troponin    Chronic combined systolic and diastolic CHF (congestive heart failure)    NSTEMI (non-ST elevated myocardial infarction)    COPD exacerbation    Altered mental status    Altered mental status, unspecified     Past Medical History:   Diagnosis Date    Adrenal adenoma     Anemia     Arrhythmia     Asthma     Atrial fibrillation     Back pain     Benign colonic polyp     Benign tumor of adrenal gland     Cataract     bilateral    Cholelithiasis     Chronic bronchitis     Chronic bronchitis with COPD (chronic obstructive pulmonary disease)     COPD (chronic obstructive pulmonary disease)  2005    Coronary artery disease     Depression     Diverticulosis Years ago    Elevated cholesterol     Environmental and seasonal allergies     Fibromyalgia     Fibromyalgia, primary Sometime in the 90s    Gastritis     Generalized anxiety disorder     GERD (gastroesophageal reflux disease)     H/O mammogram     Headache Years ago    Hemorrhoids     History of blood transfusion 1985    History of blood transfusion 1985    History of echocardiogram     History of endometriosis     History of nuclear stress test     Hospitalization or health care facility admission within last 6 months     5 times between 11/2022-05/2023    Hypertension     IBS (irritable bowel syndrome)     Impaired functional mobility, balance, gait, and endurance     Impaired mobility     Inverted nipple     Kidney disease     Kidney stone     Liver cyst     Low back pain Years ago    Nodular radiologic density     Nodule of left lung     NSTEMI (non-ST elevated myocardial infarction) 9/24/2024    On home oxygen therapy     2 liters NC QHS    Osteoarthritis     Osteopenia Several years ago    Osteoporosis     PONV (postoperative nausea and vomiting)     Renal cyst     Sinus problem     2014    Sinusitis     Skin cancer     basal cell carcinoma    SOB (shortness of breath)     Tobacco use     Urinary frequency     Urinary tract infection Years ago    Frequent UTIs    Vitamin D deficiency     Wears glasses     Wears partial dentures     upper plate     Past Surgical History:   Procedure Laterality Date    APPENDECTOMY  1980s    CARDIAC CATHETERIZATION  2003    CATARACT EXTRACTION  2013    both eyes    CHOLECYSTECTOMY  2020    CHOLECYSTECTOMY WITH INTRAOPERATIVE CHOLANGIOGRAM N/A 10/30/2020    Procedure: CHOLECYSTECTOMY LAPAROSCOPIC INTRAOPERATIVE CHOLANGIOGRAPHY;  Surgeon: Sima Moses MD;  Location: [facility];  Service: General;  Laterality: N/A;    COLON SURGERY  2020    COLONOSCOPY  03/11/2013    COLONOSCOPY  06/21/2016    COLONOSCOPY N/A  07/24/2019    Procedure: COLONOSCOPY W/ COLD FORCEP POLYPECTOMIES; HOT SNARE POLYPECTOMIES; COLD SNARE POLYPECTOMY;  Surgeon: Goyo Nunez MD;  Location: [facility]COPY;  Service: Gastroenterology    COLONOSCOPY W/ BIOPSIES AND POLYPECTOMY      EXPLORATORY LAPAROTOMY N/A 11/23/2020    Procedure: colectomy, right, closure of enterotomy x 2, reduction of internal volvulus;  Surgeon: Sima Moses MD;  Location: [facility] OR;  Service: General;  Laterality: N/A;    EXPLORATORY LAPAROTOMY N/A 8/9/2023    Procedure: LAPAROTOMY EXPLORATORY WITH LYIS OF ADHESIONS AND  CENTRAL LINE INSERTION;  Surgeon: Bianca Isaac DO;  Location: [facility] OR;  Service: General;  Laterality: N/A;    HYSTERECTOMY  1980s    partial    LYSIS OF ABDOMINAL ADHESIONS      TONSILLECTOMY  1987    UPPER GASTROINTESTINAL ENDOSCOPY  01/13/2015    VAGINAL DELIVERY      x2      General Information       Row Name 06/09/25 1331          OT Time and Intention    Document Type evaluation  -DB     Mode of Treatment occupational therapy  -DB       Row Name 06/09/25 1331          General Information    Patient Profile Reviewed yes  -DB     Prior Level of Function min assist:;mod assist:;ADL's  -DB     Existing Precautions/Restrictions fall;oxygen therapy device and L/min  -DB     Barriers to Rehab medically complex;previous functional deficit;cognitive status  -DB       Row Name 06/09/25 2588          Occupational Profile    Reason for Services/Referral (Occupational Profile) ADL decline  -DB       Row Name 06/09/25 1336          Living Environment    Current Living Arrangements home  -DB     People in Home child(ashely), adult  -DB       Row Name 06/09/25 1331          Cognition    Orientation Status (Cognition) oriented to;person;place  -DB       Row Name 06/09/25 1335          Safety Issues/Impairments Affecting Functional Mobility    Safety Issues Affecting Function (Mobility) ability to follow commands;awareness of need for assistance;insight into  deficits/self-awareness;judgment;positioning of assistive device;problem-solving;safety precaution awareness;safety precautions follow-through/compliance;sequencing abilities  -DB     Impairments Affecting Function (Mobility) balance;cognition;endurance/activity tolerance;coordination;strength  -DB     Cognitive Impairments, Mobility Safety/Performance attention;awareness, need for assistance;impulsivity;insight into deficits/self-awareness;judgment;problem-solving/reasoning;safety precaution awareness;safety precaution follow-through;sequencing abilities  -DB               User Key  (r) = Recorded By, (t) = Taken By, (c) = Cosigned By      Initials Name Provider Type    DB Nicholas Fields, OT Occupational Therapist                     Mobility/ADL's       Row Name 06/09/25 1346          Bed Mobility    Supine-Sit [place] (Bed Mobility) standby assist  -DB     Sit-Supine [place] (Bed Mobility) standby assist  -DB     Assistive Device (Bed Mobility) bed rails;head of bed elevated  -DB       Row Name 06/09/25 1346          Transfers    Transfers sit-stand transfer  -       Row Name 06/09/25 1346          Sit-Stand Transfer    Sit-Stand [place] (Transfers) contact guard;verbal cues  -DB     Assistive Device (Sit-Stand Transfers) walker, front-wheeled  -DB       Row Name 06/09/25 1346          Functional Mobility    Functional Mobility- Ind. Level contact guard assist  -DB     Functional Mobility- Device walker, front-wheeled  -DB     Functional Mobility-Distance (Feet) 20  -DB     Patient was able to Ambulate yes  -DB       Row Name 06/09/25 1346          Activities of Daily Living    BADL Assessment/Intervention bathing;upper body dressing;lower body dressing;grooming;feeding;toileting  -DB       Row Name 06/09/25 1346          Bathing Assessment/Intervention    [place] Level (Bathing) bathing skills;moderate assist (50% patient effort)  -DB       Row Name 06/09/25 1346          Upper Body  Dressing Assessment/Training    [place] Level (Upper Body Dressing) upper body dressing skills;minimum assist (75% patient effort)  -DB       Row Name 06/09/25 1346          Lower Body Dressing Assessment/Training    [place] Level (Lower Body Dressing) lower body dressing skills;minimum assist (75% patient effort)  -DB       Row Name 06/09/25 1346          Grooming Assessment/Training    [place] Level (Grooming) grooming skills;set up  -DB       Row Name 06/09/25 1346          Self-Feeding Assessment/Training    [place] Level (Feeding) feeding skills;set up  -DB       Row Name 06/09/25 1346          Toileting Assessment/Training    [place] Level (Toileting) toileting skills;minimum assist (75% patient effort)  -DB               User Key  (r) = Recorded By, (t) = Taken By, (c) = Cosigned By      Initials Name Provider Type    DB Nicholas Fields, OT Occupational Therapist                   Obj/Interventions       Row Name 06/09/25 1348          Range of Motion Comprehensive    General Range of Motion bilateral upper extremity ROM WFL  -DB       Row Name 06/09/25 1348          Strength Comprehensive (MMT)    General Manual Muscle Testing (MMT) Assessment upper extremity strength deficits identified  -DB       Row Name 06/09/25 1348          Upper Extremity (Manual Muscle Testing)    Comment, MMT: Upper Extremity BUEs grossly 3+/5  -DB               User Key  (r) = Recorded By, (t) = Taken By, (c) = Cosigned By      Initials Name Provider Type    Nicholas Elaine, OT Occupational Therapist                   Goals/Plan       Row Name 06/09/25 1354          Bed Mobility Goal 1 (OT)    Activity/Assistive Device (Bed Mobility Goal 1, OT) bed mobility activities, all  -DB     [place] Level/Cues Needed (Bed Mobility Goal 1, OT) independent  -DB     Time Frame (Bed Mobility Goal 1, OT) by discharge  -DB     Progress/Outcomes (Bed Mobility Goal 1, OT) goal ongoing  -DB       Row Name  06/09/25 1354          Transfer Goal 1 (OT)    Activity/Assistive Device (Transfer Goal 1, OT) toilet  -DB     [place] Level/Cues Needed (Transfer Goal 1, OT) modified independence  -DB     Time Frame (Transfer Goal 1, OT) by discharge  -DB     Progress/Outcome (Transfer Goal 1, OT) goal ongoing  -DB       Row Name 06/09/25 1354          Toileting Goal 1 (OT)    Activity/Device (Toileting Goal 1, OT) toileting skills, all  -DB     [place] Level/Cues Needed (Toileting Goal 1, OT) standby assist  -DB     Time Frame (Toileting Goal 1, OT) by discharge  -DB     Progress/Outcome (Toileting Goal 1, OT) goal ongoing  -DB       Row Name 06/09/25 1354          Grooming Goal 1 (OT)    Activity/Device (Grooming Goal 1, OT) grooming skills, all  -DB     [place] (Grooming Goal 1, OT) modified independence  -DB     Time Frame (Grooming Goal 1, OT) by discharge  -DB     Progress/Outcome (Grooming Goal 1, OT) goal ongoing  -DB       Row Name 06/09/25 1354          Strength Goal 1 (OT)    Strength Goal 1 (OT) Pt to tolerate resistance band exercises to increase strength and endurance needed for ADL mgmt  -DB     Time Frame (Strength Goal 1, OT) long term goal (LTG)  -DB     Progress/Outcome (Strength Goal 1, OT) goal ongoing  -DB       Row Name 06/09/25 1354          Therapy Assessment/Plan (OT)    Planned Therapy Interventions (OT) activity tolerance training;adaptive equipment training;BADL retraining;functional balance retraining;occupation/activity based interventions;ROM/therapeutic exercise;strengthening exercise;transfer/mobility retraining;patient/caregiver education/training  -DB               User Key  (r) = Recorded By, (t) = Taken By, (c) = Cosigned By      Initials Name Provider Type    DB Nicholas Fields, OT Occupational Therapist                   Clinical Impression       Row Name 06/09/25 1347          Pain Assessment    Pretreatment Pain Rating 0/10 - no pain  -DB     Posttreatment Pain Rating  "0/10 - no pain  -DB       Row Name 06/09/25 1346          Plan of Care Review    Plan of Care Reviewed With patient  -DB     Progress improving  -DB     Outcome Evaluation OT eval completed. Pt alert and oriented to self. Pt supine in bed upon OT arrival. Pt reports she lives with daughter and her daughter provides min to mod A with ADLs. Pt able to complete supine to sit EOB with CGA. Pt reports increased dizziness and requested to return sidelying. Once dizziness resolved, pt able to return to EOB. Pt performed STS with CGA and RW. Pt able to complete transfer to BSC with CGA. Pt performed toileting tasks with min A for clothing mgmt. Pt then able to complete fxl mobility x20ft with CGA and RW. Pt requested to return supine due to having the \"trembles.\" Pt left sidelying with needs in reach and bed alarm activated. Pt to benefit from skilled OT services to address strength, endurance, fxl mobility, transfers, safety awareness, and ADL mgmt.  -DB       Row Name 06/09/25 1343          Therapy Assessment/Plan (OT)    Criteria for Skilled Therapeutic Interventions Met (OT) yes;skilled treatment is necessary  -DB     Therapy Frequency (OT) 2 times/wk  -DB       Row Name 06/09/25 1343          Therapy Plan Review/Discharge Plan (OT)    Anticipated Discharge Disposition (OT) skilled nursing facility;home with home health  -DB       Row Name 06/09/25 1348          Vital Signs    O2 Delivery Pre Treatment supplemental O2  -DB     O2 Delivery Intra Treatment supplemental O2  -DB     O2 Delivery Post Treatment supplemental O2  -DB     Pre Patient Position Sitting  -DB     Intra Patient Position Standing  -DB     Post Patient Position Side Lying  -DB       Row Name 06/09/25 1344          Positioning and Restraints    Pre-Treatment Position in bed  -DB     Post Treatment Position bed  -DB     In Bed side lying right;call light within reach;encouraged to call for assist;exit alarm on  -DB               User Key  (r) = Recorded " By, (t) = Taken By, (c) = Cosigned By      Initials Name Provider Type    DB Nicholas Fields, RYAN Occupational Therapist                   Outcome Measures       Row Name 06/09/25 1355          How much help from another is currently needed...    Putting on and taking off regular lower body clothing? 2  -DB     Bathing (including washing, rinsing, and drying) 3  -DB     Toileting (which includes using toilet bed pan or urinal) 3  -DB     Putting on and taking off regular upper body clothing 3  -DB     Taking care of personal grooming (such as brushing teeth) 3  -DB     Eating meals 3  -DB     AM-PAC 6 Clicks Score (OT) 17  -DB       Row Name 06/09/25 1128 06/09/25 0803       How much help from another person do you currently need...    Turning from your back to your side while in flat bed without using bedrails? 3  -JR 3  -CC    Moving from lying on back to sitting on the side of a flat bed without bedrails? 3  -JR 3  -CC    Moving to and from a bed to a chair (including a wheelchair)? 3  -JR 3  -CC    Standing up from a chair using your arms (e.g., wheelchair, bedside chair)? 3  -JR 3  -CC    Climbing 3-5 steps with a railing? 2  -JR 2  -CC    To walk in hospital room? 3  -JR 2  -CC    AM-PAC 6 Clicks Score (PT) 17  -JR 16  -CC    Highest Level of Mobility Goal Stand (1 or More Minutes)-5  -JR Stand (1 or More Minutes)-5  -CC      Row Name 06/09/25 0500          How much help from another person do you currently need...    Turning from your back to your side while in flat bed without using bedrails? 3  -ARABELLA     Moving from lying on back to sitting on the side of a flat bed without bedrails? 3  -ARABELLA     Moving to and from a bed to a chair (including a wheelchair)? 3  -ARABELLA     Standing up from a chair using your arms (e.g., wheelchair, bedside chair)? 3  -ARABELLA     Climbing 3-5 steps with a railing? 2  -ARABELLA     To walk in hospital room? 3  -ARABELLA     AM-PAC 6 Clicks Score (PT) 17  -ARABELLA     Highest Level of Mobility Goal  Stand (1 or More Minutes)-5  -ARABELLA       Row Name 06/09/25 1355 06/09/25 1128       Functional Assessment    Outcome Measure Options AM-PAC 6 Clicks Daily Activity (OT)  -DB AM-PAC 6 Clicks Basic Mobility (PT)  -              User Key  (r) = Recorded By, (t) = Taken By, (c) = Cosigned By      Initials Name Provider Type    JR Cait Tubbs, PT Physical Therapist    Liberty Leyva, RN Registered Nurse    Nicholas Elaine OT Occupational Therapist    Nicolle Garcia RN Registered Nurse                    Occupational Therapy Education       Title: PT OT SLP Therapies (In Progress)       Topic: Occupational Therapy (In Progress)       Point: ADL training (Done)       Learning Progress Summary            Patient Acceptance, E,TB, VU,NR by  at 6/9/2025 1355    Comment: ADL retraining                                      User Key       Initials Effective Dates Name Provider Type Discipline    DB 07/06/23 -  Nicholas Fields, RYAN Occupational Therapist OT                  OT Recommendation and Plan  Planned Therapy Interventions (OT): activity tolerance training, adaptive equipment training, BADL retraining, functional balance retraining, occupation/activity based interventions, ROM/therapeutic exercise, strengthening exercise, transfer/mobility retraining, patient/caregiver education/training  Therapy Frequency (OT): 2 times/wk  Plan of Care Review  Plan of Care Reviewed With: patient  Progress: improving  Outcome Evaluation: OT eval completed. Pt alert and oriented to self. Pt supine in bed upon OT arrival. Pt reports she lives with daughter and her daughter provides min to mod A with ADLs. Pt able to complete supine to sit EOB with CGA. Pt reports increased dizziness and requested to return sidelying. Once dizziness resolved, pt able to return to EOB. Pt performed STS with CGA and RW. Pt able to complete transfer to BSC with CGA. Pt performed toileting tasks with min A for clothing mgmt. Pt then able  "to complete fxl mobility x20ft with CGA and RW. Pt requested to return supine due to having the \"trembles.\" Pt left sidelying with needs in reach and bed alarm activated. Pt to benefit from skilled OT services to address strength, endurance, fxl mobility, transfers, safety awareness, and ADL mgmt.     Time Calculation:   Evaluation Complexity (OT)  Review Occupational Profile/Medical/Therapy History Complexity: expanded/moderate complexity  Assessment, Occupational Performance/Identification of Deficit Complexity: 3-5 performance deficits  Clinical Decision Making Complexity (OT): detailed assessment/moderate complexity  Overall Complexity of Evaluation (OT): moderate complexity     Time Calculation- OT       Row Name 06/09/25 1356 06/09/25 1128          Time Calculation- OT    OT Start Time 1131  -DB --     OT Received On 06/09/25  -DB --     OT Goal Re-Cert Due Date 06/19/25  -DB --        Timed Charges    06627 - Gait Training Minutes  -- 10  -JR        Untimed Charges    OT Eval/Re-eval Minutes 39  -DB --        Total Minutes    Timed Charges Total Minutes -- 10  -JR     Untimed Charges Total Minutes 39  -DB --      Total Minutes 39  -DB 10  -JR               User Key  (r) = Recorded By, (t) = Taken By, (c) = Cosigned By      Initials Name Provider Type    Cait Ruvalcaba, PT Physical Therapist    DB Nicholas Fields OT Occupational Therapist                  Therapy Charges for Today       Code Description Service Date Service Provider Modifiers Qty    82605821863 HC OT EVAL MOD COMPLEXITY 3 6/9/2025 Nicholas Fields OT GO 1                 Nicholas Fields OT  6/9/2025    Electronically signed by Nicholas Fields OT at 06/09/25 1356       "

## 2025-06-09 NOTE — PROGRESS NOTES
[facility]    PROGRESS NOTE    Name:  Gabi Dudley   Age:  80 y.o.  Sex:  female  :  1945  MRN:  [id number]   Visit Number:  [id number]  Admission Date:  2025  Date Of Service:  25  Primary Care Physician:  Krystina Saunders DO     LOS: 2 days :    Chief Complaint:      weakness    Subjective:    2025: Discussed with daughter later in the day yesterday. Called again this morning.  Labs vitals have been stable thus far this admission.  DC plan pending.  Palliative discussion pending.  Continues to have difficulty with BiPAP tolerance. Participating in therapy. Weakness improved.      Hospital Course:       Chronically ill 80-year-old with a history of end-stage COPD with chronic hypoxic/hypercapnic respiratory failure, paroxysmal atrial fibrillation/flutter, history of prior tobacco abuse, history of medical noncompliance, hypertension, GERD, bowel obstruction, anxiety, who presented to the emergency room via EMS with shortness of breath and confusion.  History somewhat limited due to patient condition at this time.  Apparently she lives with daughter, had increasing confusion for the last day or so.  Weakness, more short of breath than typical.  Patient currently arousable, but encephalopathic.  No focal deficit.  She is currently on BiPAP.     In the ER she was on BiPAP, heart rate in the 55 range afebrile blood pressure 130/80.  CBC with a white count of 3800, hemoglobin 9 platelets 188.  CMP creatinine 0.49 with a sodium 146 CO2 of 44.  Glucose of 81.  Lactic acid 0.5.  VBG pH 7.29 with base of 204.  Urinalysis unremarkable.  Chest x-ray appears similar to x-ray from May, no acute opacity.  Head CT reportedly unremarkable.  Procalcitonin, UDS, respiratory panel pending.  Patient given DuoNeb, Solu-Medrol, and placed on BiPAP.  We were asked to admit    Patient with end-stage COPD.  Carbon dioxide some better with BiPAP use which has improved her thinking.   Anticipate discharge to rehab when a bed available.  Edited by: Milton Urban DO at 6/9/2025 1204     Review of Systems:     All systems were reviewed and negative except as mentioned in subjective, assessment and plan.    Vital Signs:    Temp:  [98 °F (36.7 °C)-99.1 °F (37.3 °C)] 98.4 °F (36.9 °C)  Heart Rate:  [65-69] 69  Resp:  [16-22] 16  BP: (117-173)/(56-83) 147/76    Intake and output:    I/O last 3 completed shifts:  In: 120 [P.O.:120]  Out: 300 [Urine:300]  I/O this shift:  In: 240 [P.O.:240]  Out: -     Physical Examination:    Constitutional: No acute distress, awake, alert  HENT: NCAT, mucous membranes moist  Respiratory: Wheeze and rhonchi bilaterally, respiratory effort increased  Cardiovascular: Heart slow but regular, no murmurs, rubs, or gallops  Gastrointestinal: Positive bowel sounds, soft, nontender, nondistended  Musculoskeletal: No bilateral ankle edema, diffuse weakness  Psychiatric: Appropriate affect, cooperative  Neurologic: Oriented x3, speech clear  Skin: No rashes  Exam stable 6/9/25  Edited by: Milton Urban DO at 6/9/2025 1204     Laboratory results:    Results from last 7 days   Lab Units 06/09/25  0737 06/08/25  0707 06/07/25  0314   SODIUM mmol/L 143 145 146*   POTASSIUM mmol/L 3.9 3.9 4.2   CHLORIDE mmol/L 98 98 97*   CO2 mmol/L 35.3* 36.3* 44.0*   BUN mg/dL 28.0* 25.0* 22.0   CREATININE mg/dL 0.51* 0.56* 0.49*   CALCIUM mg/dL 9.2 9.5 9.5   BILIRUBIN mg/dL  --   --  0.2   ALK PHOS U/L  --   --  86   ALT (SGPT) U/L  --   --  34*   AST (SGOT) U/L  --   --  34*   GLUCOSE mg/dL 94 101* 81     Results from last 7 days   Lab Units 06/09/25  0737 06/08/25  0707 06/07/25  0314   WBC 10*3/mm3 7.50 6.84 3.81   HEMOGLOBIN g/dL 9.5* 8.5* 9.1*   HEMATOCRIT % 31.8* 28.9* 32.7*   PLATELETS 10*3/mm3 255 228 188                 Recent Labs     05/03/25  1613 05/12/25  0558 05/15/25  0651   PHART 7.350 7.410 7.424   HDO2UOY 85.4* 74.5* 62.2*   PO2ART 74.2* 57.6* 164.0*   KBP2ICL 47.1*  47.2* 40.7*   BASEEXCESS 18.4* 19.7* 14.0*      I have reviewed the patient's laboratory results.    Radiology results:    No radiology results from the last 24 hrs  I have reviewed the patient's radiology reports.    Medication Review:     I have reviewed the patient's active and prn medications.     Problem List:      Acute on chronic respiratory failure with hypoxia and hypercapnia      Assessment/Plan:    COPD exacerbation  Acute on chronic hypoxic/hypercapnic respiratory failure  Acute metabolic encephalopathy likely secondary to #2  History of paroxysmal atrial fibrillation/flutter  History of diastolic/systolic CHF  Anxiety depression  History of medical noncompliance     Plan:     Respiratory failure/COPD/encephalopathy:  CO2 narcosis improved with bipap steroids breathing treatment. Patient on sedating medications, in setting of end-stage COPD and advanced hypercapnic respiratory failure with history of noncompliance with AVAPS. Must spend time every day. Bipap. Add seroquel. Repeat VBG reviewed.     Atrial fibrillation/flutter/combined CHF:  Chronically elevated not far from baseline proBNP.  Volume status appears euvolemic on exam.  Continue with oral diuretic.  Continue with amiodarone and Eliquis.  Telemetry monitoring.     Impaired mobility and ADLs/anxiety/depression:  Overall prognosis fairly poor due to medical noncompliance and advanced lung disease.  Palliative care consultation recommended.     DVT Prophylaxis: Apixaban  Code Status: DNR/DNI  Diet: Cardiac   Disposition: palliative to see anticipate  STR in 1 to 2 days, patient agreeable, medically stable  Edited by: Milton Urban DO at 6/9/2025 1204           Milton Urban DO  06/09/25  12:05 EDT    Dictated utilizing Dragon dictation.

## 2025-06-09 NOTE — PLAN OF CARE
Problem: Noninvasive Ventilation Acute  Goal: Effective Unassisted Ventilation and Oxygenation  Outcome: Progressing  Intervention: Monitor and Manage Noninvasive Ventilation  Recent Flowsheet Documentation  Taken 6/9/2025 7918 by Moustapha Rodrigues, RRT  Airway/Ventilation Management: airway patency maintained  NPPV/CPAP Maintenance: proper fit/secure   Goal Outcome Evaluation:            RT EQUIPMENT DEVICE RELATED - SKIN ASSESSMENT    Jed Score:  Jed Score: 17     RT Medical Equipment/Device:     NIV Mask:  Under-the-nose   size:       Skin Assessment:      Nose:  intact    Device Skin Pressure Protection:       Nurse Notification:  No    Moustapha Rodrigues, RRT

## 2025-06-09 NOTE — CASE MANAGEMENT/SOCIAL WORK
Case Management/Social Work    Patient Name:  Gabi Dudley  YOB: 1945  MRN: [id number]  Admit Date:  6/7/2025        SW sent referrals for STR to [facility]. SW Following.       Electronically signed by:  SAGAR Willard  06/09/25 14:44 EDT

## 2025-06-09 NOTE — PLAN OF CARE
Goal Outcome Evaluation:           Problem: Adult Inpatient Plan of Care  Goal: Plan of Care Review  Outcome: Progressing           Pt doing the same today, up to chair, remains on 4L NC baseline home O2, working with PT, pt will d/c to STR when approved and palliative care consulted.  Encouraged OOB and turning.

## 2025-06-10 LAB
ANION GAP SERPL CALCULATED.3IONS-SCNC: 5.9 MMOL/L (ref 5–15)
BACTERIA UR QL AUTO: ABNORMAL /HPF
BILIRUB UR QL STRIP: NEGATIVE
BUN SERPL-MCNC: 30 MG/DL (ref 8–23)
BUN/CREAT SERPL: 55.6 (ref 7–25)
CALCIUM SPEC-SCNC: 9.4 MG/DL (ref 8.6–10.5)
CHLORIDE SERPL-SCNC: 100 MMOL/L (ref 98–107)
CLARITY UR: CLEAR
CO2 SERPL-SCNC: 37.1 MMOL/L (ref 22–29)
COLOR UR: YELLOW
CREAT SERPL-MCNC: 0.54 MG/DL (ref 0.57–1)
DEPRECATED RDW RBC AUTO: 50.1 FL (ref 37–54)
EGFRCR SERPLBLD CKD-EPI 2021: 94.4 ML/MIN/1.73
ERYTHROCYTE [DISTWIDTH] IN BLOOD BY AUTOMATED COUNT: 15.2 % (ref 12.3–15.4)
GLUCOSE SERPL-MCNC: 112 MG/DL (ref 65–99)
GLUCOSE UR STRIP-MCNC: NEGATIVE MG/DL
HCT VFR BLD AUTO: 30.5 % (ref 34–46.6)
HGB BLD-MCNC: 9.2 G/DL (ref 12–15.9)
HGB UR QL STRIP.AUTO: ABNORMAL
HYALINE CASTS UR QL AUTO: ABNORMAL /LPF
KETONES UR QL STRIP: ABNORMAL
LEUKOCYTE ESTERASE UR QL STRIP.AUTO: ABNORMAL
MCH RBC QN AUTO: 26.8 PG (ref 26.6–33)
MCHC RBC AUTO-ENTMCNC: 30.2 G/DL (ref 31.5–35.7)
MCV RBC AUTO: 88.9 FL (ref 79–97)
MUCOUS THREADS URNS QL MICRO: ABNORMAL /HPF
NITRITE UR QL STRIP: NEGATIVE
PH UR STRIP.AUTO: 6 [PH] (ref 5–8)
PLATELET # BLD AUTO: 245 10*3/MM3 (ref 140–450)
PMV BLD AUTO: 10.5 FL (ref 6–12)
POTASSIUM SERPL-SCNC: 3.9 MMOL/L (ref 3.5–5.2)
PROT UR QL STRIP: ABNORMAL
RBC # BLD AUTO: 3.43 10*6/MM3 (ref 3.77–5.28)
RBC # UR STRIP: ABNORMAL /HPF
REF LAB TEST METHOD: ABNORMAL
SODIUM SERPL-SCNC: 143 MMOL/L (ref 136–145)
SP GR UR STRIP: 1.02 (ref 1–1.03)
SQUAMOUS #/AREA URNS HPF: ABNORMAL /HPF
UROBILINOGEN UR QL STRIP: ABNORMAL
WBC # UR STRIP: ABNORMAL /HPF
WBC NRBC COR # BLD AUTO: 6.36 10*3/MM3 (ref 3.4–10.8)

## 2025-06-10 PROCEDURE — 25010000002 METHYLPREDNISOLONE PER 40 MG: Performed by: FAMILY MEDICINE

## 2025-06-10 PROCEDURE — 81001 URINALYSIS AUTO W/SCOPE: CPT | Performed by: NURSE PRACTITIONER

## 2025-06-10 PROCEDURE — 63710000001 PREDNISONE PER 1 MG: Performed by: NURSE PRACTITIONER

## 2025-06-10 PROCEDURE — 94799 UNLISTED PULMONARY SVC/PX: CPT

## 2025-06-10 PROCEDURE — 94660 CPAP INITIATION&MGMT: CPT

## 2025-06-10 PROCEDURE — 99232 SBSQ HOSP IP/OBS MODERATE 35: CPT | Performed by: NURSE PRACTITIONER

## 2025-06-10 PROCEDURE — 63710000001 REVEFENACIN 175 MCG/3ML SOLUTION: Performed by: FAMILY MEDICINE

## 2025-06-10 PROCEDURE — 80048 BASIC METABOLIC PNL TOTAL CA: CPT | Performed by: INTERNAL MEDICINE

## 2025-06-10 PROCEDURE — 85027 COMPLETE CBC AUTOMATED: CPT | Performed by: INTERNAL MEDICINE

## 2025-06-10 PROCEDURE — 94761 N-INVAS EAR/PLS OXIMETRY MLT: CPT

## 2025-06-10 RX ORDER — PREDNISONE 20 MG/1
40 TABLET ORAL
Status: DISCONTINUED | OUTPATIENT
Start: 2025-06-10 | End: 2025-06-12 | Stop reason: HOSPADM

## 2025-06-10 RX ORDER — CLONAZEPAM 0.5 MG/1
0.5 TABLET ORAL 2 TIMES DAILY PRN
Status: DISCONTINUED | OUTPATIENT
Start: 2025-06-10 | End: 2025-06-12 | Stop reason: HOSPADM

## 2025-06-10 RX ADMIN — LOSARTAN POTASSIUM 25 MG: 25 TABLET, FILM COATED ORAL at 08:12

## 2025-06-10 RX ADMIN — AMIODARONE HYDROCHLORIDE 200 MG: 200 TABLET ORAL at 08:11

## 2025-06-10 RX ADMIN — OXYCODONE HYDROCHLORIDE AND ACETAMINOPHEN 1 TABLET: 7.5; 325 TABLET ORAL at 13:10

## 2025-06-10 RX ADMIN — CLONAZEPAM 0.5 MG: 0.5 TABLET ORAL at 08:56

## 2025-06-10 RX ADMIN — METHYLPREDNISOLONE SODIUM SUCCINATE 40 MG: 40 INJECTION INTRAMUSCULAR; INTRAVENOUS at 04:07

## 2025-06-10 RX ADMIN — ARFORMOTEROL TARTRATE 15 MCG: 15 SOLUTION RESPIRATORY (INHALATION) at 07:26

## 2025-06-10 RX ADMIN — Medication 10 ML: at 08:12

## 2025-06-10 RX ADMIN — PREDNISONE 40 MG: 20 TABLET ORAL at 11:42

## 2025-06-10 RX ADMIN — Medication 10 ML: at 20:47

## 2025-06-10 RX ADMIN — APIXABAN 5 MG: 5 TABLET, FILM COATED ORAL at 08:11

## 2025-06-10 RX ADMIN — ATORVASTATIN CALCIUM 40 MG: 40 TABLET, FILM COATED ORAL at 08:11

## 2025-06-10 RX ADMIN — BUDESONIDE 0.5 MG: 0.5 SUSPENSION RESPIRATORY (INHALATION) at 07:26

## 2025-06-10 RX ADMIN — DULOXETINE 60 MG: 30 CAPSULE, DELAYED RELEASE ORAL at 08:12

## 2025-06-10 RX ADMIN — SERTRALINE 100 MG: 50 TABLET, FILM COATED ORAL at 08:12

## 2025-06-10 RX ADMIN — APIXABAN 5 MG: 5 TABLET, FILM COATED ORAL at 20:23

## 2025-06-10 RX ADMIN — BUDESONIDE 0.5 MG: 0.5 SUSPENSION RESPIRATORY (INHALATION) at 19:31

## 2025-06-10 RX ADMIN — METOPROLOL SUCCINATE 50 MG: 50 TABLET, EXTENDED RELEASE ORAL at 08:12

## 2025-06-10 RX ADMIN — QUETIAPINE FUMARATE 25 MG: 25 TABLET ORAL at 20:23

## 2025-06-10 RX ADMIN — REVEFENACIN 175 MCG: 175 SOLUTION RESPIRATORY (INHALATION) at 07:26

## 2025-06-10 RX ADMIN — DULOXETINE 60 MG: 30 CAPSULE, DELAYED RELEASE ORAL at 20:23

## 2025-06-10 RX ADMIN — PANTOPRAZOLE SODIUM 40 MG: 40 TABLET, DELAYED RELEASE ORAL at 08:12

## 2025-06-10 RX ADMIN — FUROSEMIDE 20 MG: 20 TABLET ORAL at 08:12

## 2025-06-10 RX ADMIN — ARFORMOTEROL TARTRATE 15 MCG: 15 SOLUTION RESPIRATORY (INHALATION) at 19:31

## 2025-06-10 NOTE — CASE MANAGEMENT/SOCIAL WORK
Case Management/Social Work    Patient Name:  Gabi Dudley  YOB: 1945  MRN: [id number]  Admit Date:  6/7/2025        10:27 EDT   Discharge Plan       Row Name 06/10/25 1027       Plan    Plan Spoke with patient's daughter who has requested [facility] choice with [facility] and [facility] as backup. SW notified.                14:51 EDT  Patient received bed offer from [facility], patient and daughter notified.        Electronically signed by:  Jesus Manuel Mathis RN  06/10/25 10:27 EDT

## 2025-06-10 NOTE — PROGRESS NOTES
[facility] HOSPITALIST    PROGRESS NOTE    Name:  Gabi Dudley   Age:  80 y.o.  Sex:  female  :  1945  MRN:  [id number]   Visit Number:  [id number]  Admission Date:  2025  Date Of Service:  06/10/25  Primary Care Physician:  Krystina Saunders DO     LOS: 3 days :    Chief Complaint:      Weakness    Subjective:    Patient seen and examined.  She is very irritable and tearful.  Advised would restart Klonopin home medication at lower dose. She is agreeable to STR. Advised would transition to PO Prednisone.    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  Apparently she is currently living with daughter, had increasing confusion for the last day or so.  Weakness, more short of breath than typical.  Daughter stated patient compliant with BiPAP for several days after discharge and then started feeling better, became noncompliant.     In the ER she was on BiPAP, heart rate in the 55 range afebrile blood pressure 130/80.  CBC with a white count of 3.8, hemoglobin 9 platelets 188.  CMP creatinine 0.49 with a sodium 146 CO2 of 44.  Glucose of 81.  Lactic acid 0.5.  VBG pH 7.29 with base of 204.  Urinalysis unremarkable.  Chest x-ray appears similar to x-ray from May, no acute opacity.  Head CT reportedly unremarkable.  Procalcitonin, UDS, respiratory panel negative.  Patient given DuoNeb, Solu-Medrol, and placed on BiPAP.  Hospitalist consulted for further medical management.     Patient with end-stage COPD.  CO2 and confusion improved.  Klonopin dose decreased which may have been contributing to hypercapnia as well.  PT/OT/case management consulted as patient  agreeable for short-term rehab.    Review of Systems:     All systems were reviewed and negative except as mentioned in subjective, assessment and plan.    Vital Signs:    Temp:  [97.5 °F (36.4 °C)-98 °F (36.7 °C)] 97.5 °F (36.4 °C)  Heart Rate:  [74-80] 80  Resp:  [16-18] 18  BP: (112-172)/(47-86) 172/83    Intake and output:    I/O last 3 completed shifts:  In: 480 [P.O.:480]  Out: 200 [Urine:200]  I/O this shift:  In: 240 [P.O.:240]  Out: -     Physical Examination:    General Appearance:  Alert-irritable and tearful.   Head:  Atraumatic and normocephalic.   Eyes: Conjunctivae and sclerae normal, no icterus. No pallor.   Throat: No oral lesions, no thrush, oral mucosa moist.   Neck: Supple, trachea midline, no thyromegaly.   Lungs:   Breath sounds heard bilaterally equally.  No wheezing or crackles. No Pleural rub or bronchial breathing.  Unlabored on oxygen.   Heart:  Normal S1 and S2, no murmur, no gallop, no rub. No JVD.   Abdomen:   Normal bowel sounds, no masses, no organomegaly. Soft, nontender, nondistended, no rebound tenderness.   Extremities: Supple, no edema, no cyanosis, no clubbing.   Skin: No bleeding or rash.   Neurologic: Alert and oriented x 3 with mild confusion noted.  No facial asymmetry. Moves all four limbs.  Generalized weakness with tremor.     Laboratory results:    Results from last 7 days   Lab Units 06/10/25  0658 06/09/25  0737 06/08/25  0707 06/07/25  0314   SODIUM mmol/L 143 143 145 146*   POTASSIUM mmol/L 3.9 3.9 3.9 4.2   CHLORIDE mmol/L 100 98 98 97*   CO2 mmol/L 37.1* 35.3* 36.3* 44.0*   BUN mg/dL 30.0* 28.0* 25.0* 22.0   CREATININE mg/dL 0.54* 0.51* 0.56* 0.49*   CALCIUM mg/dL 9.4 9.2 9.5 9.5   BILIRUBIN mg/dL  --   --   --  0.2   ALK PHOS U/L  --   --   --  86   ALT (SGPT) U/L  --   --   --  34*   AST (SGOT) U/L  --   --   --  34*   GLUCOSE mg/dL 112* 94 101* 81     Results from last 7 days   Lab Units 06/10/25  0658 06/09/25  0737 06/08/25  0707   WBC 10*3/mm3 6.36 7.50 6.84    HEMOGLOBIN g/dL 9.2* 9.5* 8.5*   HEMATOCRIT % 30.5* 31.8* 28.9*   PLATELETS 10*3/mm3 245 255 228                 Recent Labs     05/03/25  1613 05/12/25  0558 05/15/25  0651   PHART 7.350 7.410 7.424   NSU4MXG 85.4* 74.5* 62.2*   PO2ART 74.2* 57.6* 164.0*   DLT4GKK 47.1* 47.2* 40.7*   BASEEXCESS 18.4* 19.7* 14.0*      I have reviewed the patient's laboratory results.    Radiology results:    No radiology results from the last 24 hrs  I have reviewed the patient's radiology reports.    Medication Review:     I have reviewed the patient's active and prn medications.     Problem List:      Acute on chronic respiratory failure with hypoxia and hypercapnia      Assessment:    COPD exacerbation  Acute on chronic hypoxic/hypercapnic respiratory failure  Acute metabolic encephalopathy likely secondary to #2  History of paroxysmal atrial fibrillation/flutter  History of diastolic/systolic CHF  Anxiety depression  History of medical noncompliance    Plan:    Respiratory failure/COPD/encephalopathy:  -CO2 narcosis improved with bipap steroids breathing treatment. -Patient on sedating medications, in setting of end-stage COPD and advanced hypercapnic respiratory failure with history of noncompliance with AVAPS.   - Klonopin dose reduced.  -Continue to monitor confusion and respiratory status.  -Transition to p.o. prednisone, continue DuoNeb/Brovana/Pulmicort/Yupelri.     Atrial fibrillation/flutter/combined CHF:  Chronically elevated not far from baseline proBNP.    -Volume status appears euvolemic on exam.    -Continue with oral diuretic.  Continue with amiodarone and Eliquis.    -Telemetry monitoring.     Impaired mobility and ADLs/anxiety/depression:  -Overall prognosis fairly poor due to medical noncompliance and advanced lung disease.    -Palliative care consultation recommended.    - Updated daughter Farida on overall plan of care.    I have reviewed the copied text and it is accurate as of 6/10/2025      DVT Prophylaxis:  Apixaban  Code Status: DNR/DNI  Diet: Cardiac   Disposition: palliative to see anticipate  STR in 1 to 2 days, patient agreeable, medically stable    REGINA Prado  06/10/25  11:10 EDT    Dictated utilizing Dragon dictation.

## 2025-06-10 NOTE — PLAN OF CARE
Goal Outcome Evaluation:  Plan of Care Reviewed With: patient        Progress: improving             Patient has been stable throughout shift with no significant changes. Pleasantly confused but able to response to all questions. Utilized BIPAP for a short time this shift. VSS. Plan of care ongoing.

## 2025-06-10 NOTE — PLAN OF CARE
Problem: Noninvasive Ventilation Acute  Goal: Effective Unassisted Ventilation and Oxygenation  Outcome: Not Progressing   Goal Outcome Evaluation:      Refuses to wear

## 2025-06-10 NOTE — THERAPY TREATMENT NOTE
Patient reports that she is too tired to work with therapy today.  She states she has been up with nursing several times already today.  PT will follow up tomorrow.

## 2025-06-10 NOTE — PLAN OF CARE
"Goal Outcome Evaluation:      PC RN attempted to speak with pt again today.  First attempt pt was eating lunch.   Second attempt pt was sleeping. BiPAP Mask in bed with pt.  Pt had pillow over her head.   Pt awakened easily with verbal stimuli.    I informed pt I was with the Palliative Care Team and requested to speak with her.  Pt reported she did not want to talk with me at this time.  I inquired if I could call her daughter.  She looked at me and replied \" I guess you can\"  Pt indicated she wanted to go back to sleep.  I asked if she would speak with me tomorrow.  She didn't know but \"maybe or the day after tomorrow\".   I informed pt I would visit again tomorrow and if she doesn't want to speak I will contact her daughter.  Pt was agreeable with the plan.     Up dated pt's primary nurse, Linda KENNY, who reported I probably would have better results speaking with her daughter when she is here.  Informed her I would try again tomorrow.                                    "

## 2025-06-10 NOTE — CASE MANAGEMENT/SOCIAL WORK
Case Management/Social Work    Patient Name:  Gabi Dudley  YOB: 1945  MRN: [id number]  Admit Date:  6/7/2025        Pt is wanting [facility] as first choice for STR. SW left message for Larissa/Admissions to see if received referral. SW following.     13:31 EDT Larissa is reviewing referral at [facility]. [facility] can offer bed. SW following.     14:48 EDT [facility] offer bed. Pt will be ready tomorrow. CM updated.       Electronically signed by:  SAGAR Willard  06/10/25 10:04 EDT

## 2025-06-10 NOTE — PLAN OF CARE
Problem: Noninvasive Ventilation Acute  Goal: Effective Unassisted Ventilation and Oxygenation  Outcome: Progressing     Problem: Adult Inpatient Plan of Care  Goal: Plan of Care Review  Outcome: Progressing  Goal: Patient-Specific Goal (Individualized)  Outcome: Progressing  Goal: Absence of Hospital-Acquired Illness or Injury  Outcome: Progressing  Intervention: Identify and Manage Fall Risk  Recent Flowsheet Documentation  Taken 6/10/2025 1600 by Linda Pollard RN  Safety Promotion/Fall Prevention:   nonskid shoes/slippers when out of bed   safety round/check completed  Taken 6/10/2025 1400 by Linda Pollard RN  Safety Promotion/Fall Prevention:   nonskid shoes/slippers when out of bed   safety round/check completed  Taken 6/10/2025 1200 by Linda Pollard RN  Safety Promotion/Fall Prevention:   nonskid shoes/slippers when out of bed   safety round/check completed  Taken 6/10/2025 1000 by Linda Pollard RN  Safety Promotion/Fall Prevention:   nonskid shoes/slippers when out of bed   safety round/check completed  Taken 6/10/2025 0800 by Linda Pollard RN  Safety Promotion/Fall Prevention:   nonskid shoes/slippers when out of bed   safety round/check completed  Intervention: Prevent Skin Injury  Recent Flowsheet Documentation  Taken 6/10/2025 1600 by Linda Pollard RN  Body Position: position changed independently  Taken 6/10/2025 1400 by Linda Pollard RN  Body Position: position changed independently  Taken 6/10/2025 1200 by Linda Pollard RN  Body Position: position changed independently  Taken 6/10/2025 1000 by Linda Pollard RN  Body Position: side-lying  Taken 6/10/2025 0800 by Linda Pollard RN  Body Position: sitting up in bed  Intervention: Prevent Infection  Recent Flowsheet Documentation  Taken 6/10/2025 1000 by Linda Pollard RN  Infection Prevention: environmental surveillance performed  Taken 6/10/2025 0800 by Linda Pollard RN  Infection Prevention: environmental surveillance  performed  Goal: Optimal Comfort and Wellbeing  Outcome: Progressing  Goal: Readiness for Transition of Care  Outcome: Progressing     Problem: Skin Injury Risk Increased  Goal: Skin Health and Integrity  Outcome: Progressing  Intervention: Optimize Skin Protection  Recent Flowsheet Documentation  Taken 6/10/2025 1600 by Linda Pollard RN  Activity Management: activity encouraged  Head of Bed (HOB) Positioning: HOB elevated  Taken 6/10/2025 1400 by Linda Pollard RN  Activity Management: activity encouraged  Head of Bed (HOB) Positioning: HOB elevated  Taken 6/10/2025 1310 by Linda Pollard RN  Activity Management: up to bedside commode  Taken 6/10/2025 1200 by Linda Pollard RN  Activity Management: activity encouraged  Head of Bed (HOB) Positioning: HOB elevated  Taken 6/10/2025 1130 by Linda Pollard RN  Activity Management: up to bedside commode  Taken 6/10/2025 1000 by Linda Pollard RN  Activity Management: activity encouraged  Head of Bed (HOB) Positioning: HOB elevated  Taken 6/10/2025 0930 by Linda Pollard RN  Activity Management: up to bedside commode  Taken 6/10/2025 0800 by Linda Pollard RN  Activity Management: activity encouraged  Head of Bed (HOB) Positioning: HOB elevated     Problem: Comorbidity Management  Goal: Maintenance of Behavioral Health Symptom Control  Outcome: Progressing  Intervention: Maintain Behavioral Health Symptom Control  Recent Flowsheet Documentation  Taken 6/10/2025 1600 by Linda Pollard RN  Medication Review/Management: medications reviewed  Taken 6/10/2025 1400 by Linda Pollard RN  Medication Review/Management: medications reviewed  Goal: Maintenance of COPD Symptom Control  Outcome: Progressing  Intervention: Maintain COPD (Chronic Obstructive Pulmonary Disease) Symptom Control  Recent Flowsheet Documentation  Taken 6/10/2025 1600 by Linda Pollard RN  Medication Review/Management: medications reviewed  Taken 6/10/2025 1400 by Linda Pollard  RN  Medication Review/Management: medications reviewed  Goal: Maintenance of Heart Failure Symptom Control  Outcome: Progressing  Intervention: Maintain Heart Failure Management  Recent Flowsheet Documentation  Taken 6/10/2025 1600 by Linda Pollard RN  Medication Review/Management: medications reviewed  Taken 6/10/2025 1400 by Linda Pollard RN  Medication Review/Management: medications reviewed  Goal: Blood Pressure in Desired Range  Outcome: Progressing  Intervention: Maintain Blood Pressure Management  Recent Flowsheet Documentation  Taken 6/10/2025 1600 by Linda Pollard RN  Medication Review/Management: medications reviewed  Taken 6/10/2025 1400 by Linda Pollard RN  Medication Review/Management: medications reviewed     Problem: Fall Injury Risk  Goal: Absence of Fall and Fall-Related Injury  Outcome: Progressing  Intervention: Identify and Manage Contributors  Recent Flowsheet Documentation  Taken 6/10/2025 1600 by Linda Pollard RN  Medication Review/Management: medications reviewed  Taken 6/10/2025 1400 by Linda Pollard RN  Medication Review/Management: medications reviewed  Intervention: Promote Injury-Free Environment  Recent Flowsheet Documentation  Taken 6/10/2025 1600 by Linda Pollard RN  Safety Promotion/Fall Prevention:   nonskid shoes/slippers when out of bed   safety round/check completed  Taken 6/10/2025 1400 by Linda Pollard RN  Safety Promotion/Fall Prevention:   nonskid shoes/slippers when out of bed   safety round/check completed  Taken 6/10/2025 1200 by Linda Pollard RN  Safety Promotion/Fall Prevention:   nonskid shoes/slippers when out of bed   safety round/check completed  Taken 6/10/2025 1000 by Linda Pollard RN  Safety Promotion/Fall Prevention:   nonskid shoes/slippers when out of bed   safety round/check completed  Taken 6/10/2025 0800 by Linda Pollard RN  Safety Promotion/Fall Prevention:   nonskid shoes/slippers when out of bed   safety round/check  completed     Problem: Sepsis/Septic Shock  Goal: Optimal Coping  Outcome: Progressing  Goal: Absence of Bleeding  Outcome: Progressing  Goal: Blood Glucose Level Within Target Range  Outcome: Progressing  Goal: Absence of Infection Signs and Symptoms  Outcome: Progressing  Intervention: Initiate Sepsis Management  Recent Flowsheet Documentation  Taken 6/10/2025 1000 by Linda Pollard RN  Infection Prevention: environmental surveillance performed  Taken 6/10/2025 0800 by Linda Pollard RN  Infection Prevention: environmental surveillance performed  Intervention: Promote Recovery  Recent Flowsheet Documentation  Taken 6/10/2025 1600 by Linda Pollard RN  Activity Management: activity encouraged  Taken 6/10/2025 1400 by Linda Pollard RN  Activity Management: activity encouraged  Taken 6/10/2025 1310 by Linda Pollard RN  Activity Management: up to bedside commode  Taken 6/10/2025 1200 by Linda Pollard RN  Activity Management: activity encouraged  Taken 6/10/2025 1130 by Linda Pollard RN  Activity Management: up to bedside commode  Taken 6/10/2025 1000 by Linda Pollard RN  Activity Management: activity encouraged  Taken 6/10/2025 0930 by Linda Pollard RN  Activity Management: up to bedside commode  Taken 6/10/2025 0800 by Linda Pollard RN  Activity Management: activity encouraged  Goal: Optimal Nutrition Delivery  Outcome: Progressing   Goal Outcome Evaluation:

## 2025-06-11 PROCEDURE — 63710000001 PREDNISONE PER 1 MG: Performed by: NURSE PRACTITIONER

## 2025-06-11 PROCEDURE — 94799 UNLISTED PULMONARY SVC/PX: CPT

## 2025-06-11 PROCEDURE — 99232 SBSQ HOSP IP/OBS MODERATE 35: CPT | Performed by: NURSE PRACTITIONER

## 2025-06-11 PROCEDURE — 63710000001 REVEFENACIN 175 MCG/3ML SOLUTION: Performed by: FAMILY MEDICINE

## 2025-06-11 PROCEDURE — 94761 N-INVAS EAR/PLS OXIMETRY MLT: CPT

## 2025-06-11 PROCEDURE — 97110 THERAPEUTIC EXERCISES: CPT

## 2025-06-11 PROCEDURE — 25010000002 ONDANSETRON PER 1 MG: Performed by: FAMILY MEDICINE

## 2025-06-11 PROCEDURE — 94660 CPAP INITIATION&MGMT: CPT

## 2025-06-11 PROCEDURE — 97535 SELF CARE MNGMENT TRAINING: CPT

## 2025-06-11 RX ADMIN — PANTOPRAZOLE SODIUM 40 MG: 40 TABLET, DELAYED RELEASE ORAL at 08:06

## 2025-06-11 RX ADMIN — AMIODARONE HYDROCHLORIDE 200 MG: 200 TABLET ORAL at 08:06

## 2025-06-11 RX ADMIN — ARFORMOTEROL TARTRATE 15 MCG: 15 SOLUTION RESPIRATORY (INHALATION) at 19:03

## 2025-06-11 RX ADMIN — REVEFENACIN 175 MCG: 175 SOLUTION RESPIRATORY (INHALATION) at 07:11

## 2025-06-11 RX ADMIN — APIXABAN 5 MG: 5 TABLET, FILM COATED ORAL at 20:42

## 2025-06-11 RX ADMIN — METOPROLOL SUCCINATE 50 MG: 50 TABLET, EXTENDED RELEASE ORAL at 08:07

## 2025-06-11 RX ADMIN — BUDESONIDE 0.5 MG: 0.5 SUSPENSION RESPIRATORY (INHALATION) at 07:11

## 2025-06-11 RX ADMIN — Medication 10 ML: at 08:08

## 2025-06-11 RX ADMIN — ONDANSETRON 4 MG: 2 INJECTION INTRAMUSCULAR; INTRAVENOUS at 07:37

## 2025-06-11 RX ADMIN — SERTRALINE 100 MG: 50 TABLET, FILM COATED ORAL at 08:06

## 2025-06-11 RX ADMIN — APIXABAN 5 MG: 5 TABLET, FILM COATED ORAL at 08:07

## 2025-06-11 RX ADMIN — CLONAZEPAM 0.5 MG: 0.5 TABLET ORAL at 08:06

## 2025-06-11 RX ADMIN — BUDESONIDE 0.5 MG: 0.5 SUSPENSION RESPIRATORY (INHALATION) at 19:03

## 2025-06-11 RX ADMIN — ARFORMOTEROL TARTRATE 15 MCG: 15 SOLUTION RESPIRATORY (INHALATION) at 07:11

## 2025-06-11 RX ADMIN — FUROSEMIDE 20 MG: 20 TABLET ORAL at 08:07

## 2025-06-11 RX ADMIN — DULOXETINE 60 MG: 30 CAPSULE, DELAYED RELEASE ORAL at 08:06

## 2025-06-11 RX ADMIN — CLONAZEPAM 0.5 MG: 0.5 TABLET ORAL at 21:04

## 2025-06-11 RX ADMIN — LOSARTAN POTASSIUM 25 MG: 25 TABLET, FILM COATED ORAL at 08:07

## 2025-06-11 RX ADMIN — ATORVASTATIN CALCIUM 40 MG: 40 TABLET, FILM COATED ORAL at 08:07

## 2025-06-11 RX ADMIN — DULOXETINE 60 MG: 30 CAPSULE, DELAYED RELEASE ORAL at 20:42

## 2025-06-11 RX ADMIN — Medication 10 ML: at 20:42

## 2025-06-11 RX ADMIN — PREDNISONE 40 MG: 20 TABLET ORAL at 08:06

## 2025-06-11 NOTE — THERAPY TREATMENT NOTE
Patient Name: Gabi Dudley  : 1945    MRN: [id number]                              Today's Date: 2025       Admit Date: 2025    Visit Dx:     ICD-10-CM ICD-9-CM   1. COPD exacerbation  J44.1 491.21   2. Metabolic encephalopathy  G93.41 348.31     Patient Active Problem List   Diagnosis    Adrenal adenoma    Anxiety    Chronic fatigue    Fibromyalgia    Hyperlipidemia    Essential hypertension    Insomnia    Osteoarthritis of knee    Osteoporosis    Pulmonary emphysema    Simple renal cyst    Tension headache    Vitamin D deficiency    Diverticulosis    Inversion of nipple    Paroxysmal atrial fibrillation    Coronary artery disease involving native coronary artery of native heart without angina pectoris    Autonomic dysfunction    Gastroesophageal reflux disease without esophagitis    Urge incontinence    Erythrasma    Compression fracture of T5 vertebra    Lung nodule    COPD (chronic obstructive pulmonary disease)    Overweight (BMI 25.0-29.9)    Acute on chronic respiratory failure with hypoxia    Acute on chronic respiratory failure with hypoxia and hypercapnia    Iron deficiency anemia    Impaired mobility and ADLs    Acute respiratory failure with hypoxia and hypercapnia    Aspiration pneumonia of right lower lobe    Partial small bowel obstruction    Moderate malnutrition    Bowel obstruction    Enteritis    Diverticulitis    Elevated troponin    Chronic combined systolic and diastolic CHF (congestive heart failure)    NSTEMI (non-ST elevated myocardial infarction)    COPD exacerbation    Altered mental status    Altered mental status, unspecified     Past Medical History:   Diagnosis Date    Adrenal adenoma     Anemia     Arrhythmia     Asthma     Atrial fibrillation     Back pain     Benign colonic polyp     Benign tumor of adrenal gland     Cataract     bilateral    Cholelithiasis     Chronic bronchitis     Chronic bronchitis with COPD (chronic obstructive pulmonary disease)     COPD  (chronic obstructive pulmonary disease) 2005    Coronary artery disease     Depression     Diverticulosis Years ago    Elevated cholesterol     Environmental and seasonal allergies     Fibromyalgia     Fibromyalgia, primary Sometime in the 90s    Gastritis     Generalized anxiety disorder     GERD (gastroesophageal reflux disease)     H/O mammogram     Headache Years ago    Hemorrhoids     History of blood transfusion 1985    History of blood transfusion 1985    History of echocardiogram     History of endometriosis     History of nuclear stress test     Hospitalization or health care facility admission within last 6 months     5 times between 11/2022-05/2023    Hypertension     IBS (irritable bowel syndrome)     Impaired functional mobility, balance, gait, and endurance     Impaired mobility     Inverted nipple     Kidney disease     Kidney stone     Liver cyst     Low back pain Years ago    Nodular radiologic density     Nodule of left lung     NSTEMI (non-ST elevated myocardial infarction) 9/24/2024    On home oxygen therapy     2 liters NC QHS    Osteoarthritis     Osteopenia Several years ago    Osteoporosis     PONV (postoperative nausea and vomiting)     Renal cyst     Sinus problem     2014    Sinusitis     Skin cancer     basal cell carcinoma    SOB (shortness of breath)     Tobacco use     Urinary frequency     Urinary tract infection Years ago    Frequent UTIs    Vitamin D deficiency     Wears glasses     Wears partial dentures     upper plate     Past Surgical History:   Procedure Laterality Date    APPENDECTOMY  1980s    CARDIAC CATHETERIZATION  2003    CATARACT EXTRACTION  2013    both eyes    CHOLECYSTECTOMY  2020    CHOLECYSTECTOMY WITH INTRAOPERATIVE CHOLANGIOGRAM N/A 10/30/2020    Procedure: CHOLECYSTECTOMY LAPAROSCOPIC INTRAOPERATIVE CHOLANGIOGRAPHY;  Surgeon: Sima Moses MD;  Location: [facility];  Service: General;  Laterality: N/A;    COLON SURGERY  2020    COLONOSCOPY  03/11/2013     COLONOSCOPY  06/21/2016    COLONOSCOPY N/A 07/24/2019    Procedure: COLONOSCOPY W/ COLD FORCEP POLYPECTOMIES; HOT SNARE POLYPECTOMIES; COLD SNARE POLYPECTOMY;  Surgeon: Goyo Nunez MD;  Location: [facility] ENDOSCOPY;  Service: Gastroenterology    COLONOSCOPY W/ BIOPSIES AND POLYPECTOMY      EXPLORATORY LAPAROTOMY N/A 11/23/2020    Procedure: colectomy, right, closure of enterotomy x 2, reduction of internal volvulus;  Surgeon: Sima Moses MD;  Location: [facility] OR;  Service: General;  Laterality: N/A;    EXPLORATORY LAPAROTOMY N/A 8/9/2023    Procedure: LAPAROTOMY EXPLORATORY WITH LYIS OF ADHESIONS AND  CENTRAL LINE INSERTION;  Surgeon: Bianca Isaac DO;  Location: [facility] OR;  Service: General;  Laterality: N/A;    HYSTERECTOMY  1980s    partial    LYSIS OF ABDOMINAL ADHESIONS      TONSILLECTOMY  1987    UPPER GASTROINTESTINAL ENDOSCOPY  01/13/2015    VAGINAL DELIVERY      x2      General Information       Row Name 06/11/25 1218          OT Time and Intention    Subjective Information complains of;pain  -TL     Document Type therapy note (daily note)  -TL     Mode of Treatment occupational therapy  -TL     Patient Effort good  -TL       Row Name 06/11/25 1218          General Information    Patient Profile Reviewed yes  -TL     Existing Precautions/Restrictions fall;oxygen therapy device and L/min  -TL               User Key  (r) = Recorded By, (t) = Taken By, (c) = Cosigned By      Initials Name Provider Type    TL Bo, Tesha, OTR Occupational Therapist                     Mobility/ADL's       Row Name 06/11/25 1219          Bed Mobility    Supine-Sit [place] (Bed Mobility) standby assist  -TL     Sit-Supine [place] (Bed Mobility) standby assist  -TL     Assistive Device (Bed Mobility) bed rails;head of bed elevated  -TL       Row Name 06/11/25 1229          Transfers    Transfers toilet transfer  -TL       Row Name 06/11/25 1229          Toilet Transfer    Type (Toilet Transfer)  sit-stand;stand-sit  -TL     [place] Level (Toilet Transfer) contact guard  -TL     Assistive Device (Toilet Transfer) commode, bedside without drop arms  -TL     Comment, (Toilet Transfer) gait belt  -TL       Row Name 06/11/25 1229          Toileting Assessment/Training    [place] Level (Toileting) toileting skills;adjust/manage clothing;standby assist  -TL     Assistive Devices (Toileting) commode, bedside without drop arms  -TL               User Key  (r) = Recorded By, (t) = Taken By, (c) = Cosigned By      Initials Name Provider Type    TL Tesha Soriano OTR Occupational Therapist                   Obj/Interventions       Row Name 06/11/25 1220          Shoulder (Therapeutic Exercise)    Shoulder (Therapeutic Exercise) strengthening exercise  -TL     Shoulder Strengthening (Therapeutic Exercise) bilateral;flexion;extension;aBduction;aDduction;horizontal aBduction/aDduction;sitting;resistance band;yellow;10 repetitions  -TL       Row Name 06/11/25 1220          Elbow/Forearm (Therapeutic Exercise)    Elbow/Forearm (Therapeutic Exercise) strengthening exercise  -TL     Elbow/Forearm Strengthening (Therapeutic Exercise) bilateral;flexion;extension;resistance band;yellow;sitting;10 repetitions  -TL       Row Name 06/11/25 1220          Motor Skills    Therapeutic Exercise shoulder;elbow/forearm  -TL       Row Name 06/11/25 1220          Balance    Balance Assessment sitting static balance;sitting dynamic balance  -TL     Static Sitting Balance standby assist  -TL     Dynamic Sitting Balance standby assist  -TL     Balance Interventions sitting;UE activity with balance activity  -TL               User Key  (r) = Recorded By, (t) = Taken By, (c) = Cosigned By      Initials Name Provider Type    TL Tesha Soriano OTR Occupational Therapist                   Goals/Plan    No documentation.                  Clinical Impression       Row Name 06/11/25 1223          Pain Assessment    Pretreatment Pain Rating 9/10   -TL     Posttreatment Pain Rating 9/10  -TL     Pain Side/Orientation generalized  -TL     Pain Management Interventions activity modification encouraged;breathing exercises utilized;exercise or physical activity utilized;positioning techniques utilized  -TL     Response to Pain Interventions activity participation with tolerable pain  -TL     Pre/Posttreatment Pain Comment Patient reported 9/10 pain but was able to perform all B UE exercises  -TL       Row Name 06/11/25 1223          Plan of Care Review    Plan of Care Reviewed With patient  -TL     Progress improving  -TL     Outcome Evaluation Patient was SBA for bed mobility exercises supine to EOB and back to supine in bed.  Patient completed B UE yellow t-band exercises while sitting on the EOB 10 reps each.  Patient was SBA for static and dynamic sitting while participating in B UE exercises. Patient required moderate cuing for pacing and breathing coordination with B UE exercises.  Continue with OT focusing on increasing strength, mobility, and ADL function.  -TL       Row Name 06/11/25 1223          Therapy Assessment/Plan (OT)    Rehab Potential (OT) good  -TL     Criteria for Skilled Therapeutic Interventions Met (OT) yes;skilled treatment is necessary  -TL       Row Name 06/11/25 1223          Vital Signs    Pre SpO2 (%) 99  -TL     O2 Delivery Pre Treatment supplemental O2  -TL     Intra SpO2 (%) 96  -TL     O2 Delivery Intra Treatment supplemental O2  -TL     Post SpO2 (%) 98  -TL     O2 Delivery Post Treatment supplemental O2  -TL     Pre Patient Position Supine  -TL     Intra Patient Position Sitting  -TL     Post Patient Position Supine  -TL       Row Name 06/11/25 1223          Positioning and Restraints    Pre-Treatment Position in bed  -TL     Post Treatment Position bed  -TL     In Bed notified nsg;supine;call light within reach;encouraged to call for assist  -TL               User Key  (r) = Recorded By, (t) = Taken By, (c) = Cosigned By       Initials Name Provider Type    Tesha Moss OTR Occupational Therapist                   Outcome Measures       Row Name 06/11/25 1233          How much help from another is currently needed...    Putting on and taking off regular lower body clothing? 2  -TL     Bathing (including washing, rinsing, and drying) 3  -TL     Toileting (which includes using toilet bed pan or urinal) 3  -TL     Putting on and taking off regular upper body clothing 3  -TL     Taking care of personal grooming (such as brushing teeth) 3  -TL     Eating meals 4  -TL     AM-PAC 6 Clicks Score (OT) 18  -TL       Row Name 06/11/25 0800          How much help from another person do you currently need...    Turning from your back to your side while in flat bed without using bedrails? 3  -MH     Moving from lying on back to sitting on the side of a flat bed without bedrails? 3  -MH     Moving to and from a bed to a chair (including a wheelchair)? 3  -MH     Standing up from a chair using your arms (e.g., wheelchair, bedside chair)? 3  -MH     Climbing 3-5 steps with a railing? 2  -MH     To walk in hospital room? 3  -MH     AM-PAC 6 Clicks Score (PT) 17  -MH     Highest Level of Mobility Goal Stand (1 or More Minutes)-5  -MH               User Key  (r) = Recorded By, (t) = Taken By, (c) = Cosigned By      Initials Name Provider Type    Tesha Moss OTR Occupational Therapist    Linda Kim, RN Registered Nurse                    Occupational Therapy Education       Title: PT OT SLP Therapies (In Progress)       Topic: Occupational Therapy (In Progress)       Point: ADL training (Done)       Learning Progress Summary            Patient Acceptance, E,TB, VU,NR by DB at 6/9/2025 1355    Comment: ADL retraining                      Point: Precautions (Done)       Learning Progress Summary            Patient Acceptance, E,TB, DU,VU,NR by TL at 6/11/2025 1233    Comment: Patient educated on pacing techniques and breathing coordination with  activity.                      Point: Body mechanics (Done)       Learning Progress Summary            Patient Acceptance, E,TB, DU,VU,NR by  at 6/11/2025 1233    Comment: Patient educated on pacing techniques and breathing coordination with activity.                                      User Key       Initials Effective Dates Name Provider Type Discipline     06/16/21 -  Tesha Soriano OTR Occupational Therapist OT    DB 07/06/23 -  Nicholas Fields OT Occupational Therapist OT                  OT Recommendation and Plan     Plan of Care Review  Plan of Care Reviewed With: patient  Progress: improving  Outcome Evaluation: Patient was SBA for bed mobility exercises supine to EOB and back to supine in bed.  Patient completed B UE yellow t-band exercises while sitting on the EOB 10 reps each.  Patient was SBA for static and dynamic sitting while participating in B UE exercises. Patient required moderate cuing for pacing and breathing coordination with B UE exercises.  Continue with OT focusing on increasing strength, mobility, and ADL function.     Time Calculation:         Time Calculation- OT       Row Name 06/11/25 1234             Time Calculation- OT    OT Start Time 1006  -TL      OT Stop Time 1030  -TL      OT Time Calculation (min) 24 min  -TL      OT Received On 06/11/25  -TL         Timed Charges    77957 - OT Therapeutic Exercise Minutes 14  -TL      31629 - OT Self Care/Mgmt Minutes 10  -TL         Total Minutes    Timed Charges Total Minutes 24  -TL       Total Minutes 24  -TL                User Key  (r) = Recorded By, (t) = Taken By, (c) = Cosigned By      Initials Name Provider Type    TL Tesha Soriano OTCHASE Occupational Therapist                  Therapy Charges for Today       Code Description Service Date Service Provider Modifiers Qty    [id number]  OT THER PROC EA 15 MIN 6/11/2025 Tesha Soriano OTR GO 1    [id number]  OT SELF CARE/MGMT/TRAIN EA 15 MIN 6/11/2025 Tesha Soriano OTR GO 1                  Tesha Soriano, OTR  6/11/2025

## 2025-06-11 NOTE — PLAN OF CARE
Problem: Noninvasive Ventilation Acute  Goal: Effective Unassisted Ventilation and Oxygenation  Outcome: Not Progressing   Goal Outcome Evaluation:   Pt does not wear as prescribed

## 2025-06-11 NOTE — PLAN OF CARE
Problem: Noninvasive Ventilation Acute  Goal: Effective Unassisted Ventilation and Oxygenation  Outcome: Progressing   Goal Outcome Evaluation:                 RT EQUIPMENT DEVICE RELATED - SKIN ASSESSMENT    Jed Score:  Jed Score: 16     RT Medical Equipment/Device:     NIV Mask:  Under-the-nose   size:       Skin Assessment:      Nose:  Intact    Device Skin Pressure Protection:  Pressure points protected    Nurse Notification:  Cora Joel, CRT

## 2025-06-11 NOTE — CASE MANAGEMENT/SOCIAL WORK
Case Management/Social Work    Patient Name:  Gabi Dudley  YOB: 1945  MRN: [id number]  Admit Date:  6/7/2025        Pt can admit to [facility] when medically stable. SW following.     10:05 EDT Pt actually can admit tomorrow per facility. [facility] following,.       Electronically signed by:  SAGAR Willard  06/11/25 09:43 EDT

## 2025-06-11 NOTE — PROGRESS NOTES
[facility] HOSPITALIST    PROGRESS NOTE    Name:  Gabi Dudley   Age:  80 y.o.  Sex:  female  :  1945  MRN:  [id number]   Visit Number:  [id number]  Admission Date:  2025  Date Of Service:  25  Primary Care Physician:  Krystina Saunders DO     LOS: 4 days :    Chief Complaint:      Weakness    Subjective:    Patient seen and examined.  States needing to take a nap.  Was noted to wear AVAPS on and off throughout the night.    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  Apparently she is currently living with daughter, had increasing confusion for the last day or so.  Weakness, more short of breath than typical.  Daughter stated patient compliant with BiPAP for several days after discharge and then started feeling better, became noncompliant.     In the ER she was on BiPAP, heart rate in the 55 range afebrile blood pressure 130/80.  CBC with a white count of 3.8, hemoglobin 9 platelets 188.  CMP creatinine 0.49 with a sodium 146 CO2 of 44.  Glucose of 81.  Lactic acid 0.5.  VBG pH 7.29 with base of 204.  Urinalysis unremarkable.  Chest x-ray appears similar to x-ray from May, no acute opacity.  Head CT reportedly unremarkable.  Procalcitonin, UDS, respiratory panel negative.  Patient given DuoNeb, Solu-Medrol, and placed on BiPAP.  Hospitalist consulted for further medical management.     Patient with end-stage COPD.  CO2 and confusion improved.  Klonopin dose decreased which may have been contributing to hypercapnia as well.  PT/OT/case management consulted as patient agreeable for short-term rehab.    Review of Systems:     All systems were reviewed  and negative except as mentioned in subjective, assessment and plan.    Vital Signs:    Temp:  [97.6 °F (36.4 °C)-98.5 °F (36.9 °C)] 98.5 °F (36.9 °C)  Heart Rate:  [63-65] 63  Resp:  [16-18] 18  BP: (107-172)/(52-77) 165/73    Intake and output:    I/O last 3 completed shifts:  In: 480 [P.O.:480]  Out: 200 [Urine:200]  No intake/output data recorded.    Physical Examination:    General Appearance:  Alert-chronically ill middle-age female.  Appears more calm today.   Head:  Atraumatic and normocephalic.   Eyes: Conjunctivae and sclerae normal, no icterus. No pallor.   Throat: No oral lesions, no thrush, oral mucosa moist.   Neck: Supple, trachea midline, no thyromegaly.   Lungs:   Breath sounds heard bilaterally equally.  No wheezing or crackles. No Pleural rub or bronchial breathing.  Unlabored on oxygen.   Heart:  Normal S1 and S2, no murmur, no gallop, no rub. No JVD.   Abdomen:   Normal bowel sounds, no masses, no organomegaly. Soft, nontender, nondistended, no rebound tenderness.   Extremities: Supple, no edema, no cyanosis, no clubbing.   Skin: No bleeding or rash.   Neurologic: Alert and oriented x 3 with mild confusion noted.  No facial asymmetry. Moves all four limbs.  Generalized weakness with tremor.     Laboratory results:    Results from last 7 days   Lab Units 06/10/25  0658 06/09/25  0737 06/08/25  0707 06/07/25  0314   SODIUM mmol/L 143 143 145 146*   POTASSIUM mmol/L 3.9 3.9 3.9 4.2   CHLORIDE mmol/L 100 98 98 97*   CO2 mmol/L 37.1* 35.3* 36.3* 44.0*   BUN mg/dL 30.0* 28.0* 25.0* 22.0   CREATININE mg/dL 0.54* 0.51* 0.56* 0.49*   CALCIUM mg/dL 9.4 9.2 9.5 9.5   BILIRUBIN mg/dL  --   --   --  0.2   ALK PHOS U/L  --   --   --  86   ALT (SGPT) U/L  --   --   --  34*   AST (SGOT) U/L  --   --   --  34*   GLUCOSE mg/dL 112* 94 101* 81     Results from last 7 days   Lab Units 06/10/25  0658 06/09/25  0737 06/08/25  0707   WBC 10*3/mm3 6.36 7.50 6.84   HEMOGLOBIN g/dL 9.2* 9.5* 8.5*   HEMATOCRIT % 30.5*  31.8* 28.9*   PLATELETS 10*3/mm3 245 255 228                 Recent Labs     05/03/25  1613 05/12/25  0558 05/15/25  0651   PHART 7.350 7.410 7.424   EQG4JQC 85.4* 74.5* 62.2*   PO2ART 74.2* 57.6* 164.0*   UHC9CPM 47.1* 47.2* 40.7*   BASEEXCESS 18.4* 19.7* 14.0*      I have reviewed the patient's laboratory results.    Radiology results:    No radiology results from the last 24 hrs  I have reviewed the patient's radiology reports.    Medication Review:     I have reviewed the patient's active and prn medications.     Problem List:      Acute on chronic respiratory failure with hypoxia and hypercapnia      Assessment:    COPD exacerbation  Acute on chronic hypoxic/hypercapnic respiratory failure  Acute metabolic encephalopathy likely secondary to #2  History of paroxysmal atrial fibrillation/flutter  History of diastolic/systolic CHF  Anxiety depression  History of medical noncompliance    Plan:    Respiratory failure/COPD/encephalopathy:  -CO2 narcosis improved with bipap steroids breathing treatment. -Patient on sedating medications, in setting of end-stage COPD and advanced hypercapnic respiratory failure with history of noncompliance with AVAPS.   - Klonopin dose reduced.  -Continue to monitor confusion and respiratory status.  -Transition to p.o. prednisone, continue DuoNeb/Brovana/Pulmicort/Yupelri.  -Continue to encourage AVAPS use throughout the day and at night.     Atrial fibrillation/flutter/combined CHF:  Chronically elevated not far from baseline proBNP.    -Volume status appears euvolemic on exam.    -Continue with oral diuretic.  Continue with amiodarone and Eliquis.    -Telemetry monitoring.     Impaired mobility and ADLs/anxiety/depression:  -Overall prognosis fairly poor due to medical noncompliance and advanced lung disease.    -Palliative care consultation recommended.    - Attempted to call daughter Farida with no answer.    I have reviewed the copied text and it is accurate as of 6/11/2025      DVT  Prophylaxis: Apixaban  Code Status: DNR/DNI  Diet: Cardiac   Disposition: [place] swing bed tomorrow.    Britt Nascimento, REGINA  06/11/25  11:51 EDT    Dictated utilizing Dragon dictation.

## 2025-06-11 NOTE — PLAN OF CARE
Goal Outcome Evaluation:         No acute changes overnight, pt switched back and forth with NC and bipap

## 2025-06-11 NOTE — CASE MANAGEMENT/SOCIAL WORK
Case Management/Social Work    Patient Name:  Gabi Dudley  YOB: 1945  MRN: [id number]  Admit Date:  6/7/2025        08:45 EDT   Discharge Plan       Row Name 06/11/25 0844       Plan    [facility]                        Electronically signed by:  Carolyn Lucas RN  06/11/25 08:45 EDT

## 2025-06-11 NOTE — PROGRESS NOTES
"Dietitian Follow-up    Patient Name: Gabi Dudley  YOB: 1945  MRN: [id number]  Admission date: 6/7/2025    Comment:    Clinical Nutrition Follow-up   Encounter Information        Trending Narrative     6/11: PO intake averaging 67% x 3 meals. Supplementation ordered.     6/9: MST=3 d/t unsure wt loss and eating poorly. Average PO intake 50% x 5 meals. EMR does show wt loss of 9# (6.4%) within 5 months. Wt loss is not significant to malnutrition timeframe criteria. Will order Boost Original daily to promote PO intake/wt stability.      Anthropometrics        Current Height, Weight Height: 165.1 cm (65\")  Weight: 59.4 kg (131 lb) (06/07/25 0328)       Trending Weight Hx     This admission:              PTA:     Wt Readings from Last 30 Encounters:   06/07/25 0328 59.4 kg (131 lb)   05/29/25 1345 59.4 kg (131 lb)   05/16/25 0456 62.1 kg (136 lb 14.5 oz)   05/15/25 0630 60.4 kg (133 lb 2.5 oz)   05/14/25 0428 60 kg (132 lb 4.4 oz)   05/13/25 0600 60.5 kg (133 lb 6.1 oz)   05/12/25 1031 62 kg (136 lb 11 oz)   05/12/25 0536 59 kg (130 lb)   05/06/25 0600 59.5 kg (131 lb 2.8 oz)   05/04/25 1105 59.4 kg (130 lb 15.3 oz)   05/03/25 1640 59.4 kg (131 lb)   05/03/25 1401 59 kg (130 lb)   05/01/25 1535 59 kg (130 lb)   04/14/25 1628 59.4 kg (131 lb)   04/09/25 2119 59 kg (130 lb)   02/13/25 1615 58.1 kg (128 lb)   01/02/25 1628 63.5 kg (140 lb)   12/20/24 1439 63.5 kg (139 lb 15.9 oz)   12/20/24 0332 63.5 kg (140 lb)   07/23/24 1445 68 kg (149 lb 14.6 oz)   07/23/24 1229 68 kg (149 lb 14.6 oz)   07/22/24 0941 68 kg (149 lb 14.6 oz)   07/10/24 1712 68 kg (150 lb)   06/27/24 2334 68 kg (150 lb)   06/13/24 1735 68 kg (150 lb)   06/05/24 1653 68 kg (150 lb)   01/25/24 1435 64.9 kg (143 lb)   01/09/24 1626 64.9 kg (143 lb)   12/11/23 1641 65.8 kg (145 lb)   10/31/23 1610 65.8 kg (145 lb)   09/26/23 1538 66.2 kg (146 lb)   09/19/23 1547 66.7 kg (147 lb 1.9 oz)   09/18/23 1305 67.6 kg (149 lb)   09/01/23 1057 67.6 kg " (149 lb)   08/18/23 0400 75.3 kg (166 lb 0.1 oz)   08/17/23 0300 74.5 kg (164 lb 3.9 oz)   08/16/23 0300 76.7 kg (169 lb 1.5 oz)   08/14/23 0400 76.1 kg (167 lb 12.3 oz)   08/13/23 0300 77.2 kg (170 lb 3.1 oz)   08/12/23 0300 75.7 kg (166 lb 14.2 oz)   08/11/23 0300 76.4 kg (168 lb 6.9 oz)   08/10/23 0336 74 kg (163 lb 2.3 oz)   08/09/23 0444 75.1 kg (165 lb 9.1 oz)   08/08/23 0500 72.7 kg (160 lb 4.4 oz)   08/07/23 2001 72.7 kg (160 lb 4.4 oz)   08/07/23 1514 72.6 kg (160 lb)   07/30/23 1900 72.6 kg (160 lb)   07/20/23 1721 72.8 kg (160 lb 6.4 oz)   07/20/23 1524 72.6 kg (160 lb)   06/27/23 1519 73.5 kg (162 lb)   06/26/23 1332 73.5 kg (162 lb)   06/02/23 1525 73.5 kg (162 lb)      BMI kg/m2 Body mass index is 21.8 kg/m².     Labs        Pertinent Labs Results from last 7 days   Lab Units 06/10/25  0658 06/09/25  0737 06/08/25  0707 06/07/25  0314   SODIUM mmol/L 143 143 145 146*   POTASSIUM mmol/L 3.9 3.9 3.9 4.2   CHLORIDE mmol/L 100 98 98 97*   CO2 mmol/L 37.1* 35.3* 36.3* 44.0*   BUN mg/dL 30.0* 28.0* 25.0* 22.0   CREATININE mg/dL 0.54* 0.51* 0.56* 0.49*   CALCIUM mg/dL 9.4 9.2 9.5 9.5   BILIRUBIN mg/dL  --   --   --  0.2   ALK PHOS U/L  --   --   --  86   ALT (SGPT) U/L  --   --   --  34*   AST (SGOT) U/L  --   --   --  34*   GLUCOSE mg/dL 112* 94 101* 81     Results from last 7 days   Lab Units 06/10/25  0658   HEMOGLOBIN g/dL 9.2*   HEMATOCRIT % 30.5*         Medications    Scheduled Medications amiodarone, 200 mg, Oral, Q24H  apixaban, 5 mg, Oral, Q12H  arformoterol, 15 mcg, Nebulization, BID - RT   And  budesonide, 0.5 mg, Nebulization, BID - RT   And  revefenacin, 175 mcg, Nebulization, Daily - RT  atorvastatin, 40 mg, Oral, Daily  DULoxetine, 60 mg, Oral, BID  furosemide, 20 mg, Oral, Daily  losartan, 25 mg, Oral, Daily  metoprolol succinate XL, 50 mg, Oral, Daily  pantoprazole, 40 mg, Oral, Daily  predniSONE, 40 mg, Oral, Daily With Breakfast  sertraline, 100 mg, Oral, Daily  sodium chloride, 10 mL,  Intravenous, Q12H        Infusions      PRN Medications   acetaminophen **OR** acetaminophen **OR** acetaminophen    aluminum-magnesium hydroxide-simethicone    benzonatate    senna-docusate sodium **AND** polyethylene glycol **AND** bisacodyl **AND** bisacodyl    clonazePAM    Diclofenac Sodium    ipratropium-albuterol    nitroglycerin    ondansetron ODT **OR** ondansetron    oxyCODONE-acetaminophen    QUEtiapine    sodium chloride    sodium chloride    sodium chloride     Physical Findings        Trending Physical   Appearance, NFPE    --  Edema                            Bowel Function Stool Output  Stool Unmeasured Occurrence: 1 (06/11/25 0711)  Bowel Incontinence: No (06/11/25 0711)  Stool Amount: Medium (06/11/25 0711)  Stool Consistency: soft (06/11/25 0711)  Perineal Care: perineum cleansed (06/11/25 0417)  Last Bowel Movement: 06/10/25   Chewing/Swallowing Teeth Status:Mouth/Teeth WDL: .WDL except, teeth Teeth Symptoms: tooth/teeth missing  Chewing/Swallowing Issues: No issues identified at this time   Skin Lines, Drains, Airways, & Wounds       Active LDAs       Name Placement date Placement time Site Days Last dressing change    Peripheral IV 18 G Right Antecubital --  --  Antecubital  --     Wound 05/12/25 1013 perineum 05/12/25  1013  -- 30                     --  Current Nutrition Orders & Evaluation of Intake       Oral Nutrition     Food Allergies NKFA   Current PO Diet Diet: Cardiac; Healthy Heart (2-3 Na+); Fluid Consistency: Thin (IDDSI 0)   Supplement Boost Original BID   PO Evaluation     Trending % PO Intake 6/11: 67% x 3 meals   6/9: 50% x 4 meals        Enteral Nutrition    Current EN Order Patient isn't on Tube Feeding    Patient doesn't have any tube feeding modular orders    EN Route    EN Tolerance    EN Observation        Parenteral Nutrition    Current TPN Order    TPN Route    Lipids (mL/%/frequency)    MVI Frequency    Trace Element Frequency    Total # Days on TPN    TPN Observation       Nutrition Diagnosis         Nutrition Dx Problem 1 Underweight r/t COPD AEB BMI=21.8.       Nutrition Dx Problem 2      Goal(s) Increase PO Intake , Meets Estimated Needs , Accepts Oral Nutrition Supplement, Maintain Weight, and No Significant Weight Loss     Intervention         Intervention  Continue to monitor for plan of care and Continue with current interventions       Diet Prescription HH   Supplement Prescription  Boost Original daily        Enteral Prescription         TPN Prescription         Education Provided         Monitor/Evaluation        Monitor Per Protocol, PO Intake, Oral Nutrition Supplement Intake, Pertinent Labs, Weight, Skin Status, GI Status, Symptoms, Swallow Function, and Hemodynamic Stability     RD Follow-up Encounter 1-2 days     Electronically signed by:  Sergey Arredondo RD  06/11/25 13:14 EDT

## 2025-06-11 NOTE — PLAN OF CARE
Goal Outcome Evaluation:  Plan of Care Reviewed With: patient        Progress: improving  Outcome Evaluation: Patient was SBA for bed mobility exercises supine to EOB and back to supine in bed.  Patient completed B UE yellow t-band exercises while sitting on the EOB 10 reps each.  Patient was SBA for static and dynamic sitting while participating in B UE exercises. Patient required moderate cuing for pacing and breathing coordination with B UE exercises.  Continue with OT focusing on increasing strength, mobility, and ADL function.

## 2025-06-11 NOTE — PLAN OF CARE
Problem: Noninvasive Ventilation Acute  Goal: Effective Unassisted Ventilation and Oxygenation  Outcome: Progressing     Problem: Adult Inpatient Plan of Care  Goal: Plan of Care Review  Outcome: Progressing  Goal: Patient-Specific Goal (Individualized)  Outcome: Progressing  Goal: Absence of Hospital-Acquired Illness or Injury  Outcome: Progressing  Intervention: Identify and Manage Fall Risk  Recent Flowsheet Documentation  Taken 6/11/2025 1600 by Linda Pollard RN  Safety Promotion/Fall Prevention:   nonskid shoes/slippers when out of bed   safety round/check completed  Taken 6/11/2025 1400 by Linda Pollard RN  Safety Promotion/Fall Prevention:   nonskid shoes/slippers when out of bed   safety round/check completed  Taken 6/11/2025 1200 by Linda Pollard RN  Safety Promotion/Fall Prevention:   nonskid shoes/slippers when out of bed   safety round/check completed  Taken 6/11/2025 1000 by Linda Pollard RN  Safety Promotion/Fall Prevention:   nonskid shoes/slippers when out of bed   safety round/check completed  Taken 6/11/2025 0800 by Linda Pollard RN  Safety Promotion/Fall Prevention:   nonskid shoes/slippers when out of bed   safety round/check completed  Intervention: Prevent Skin Injury  Recent Flowsheet Documentation  Taken 6/11/2025 1600 by Linda Pollard RN  Body Position: position changed independently  Taken 6/11/2025 1400 by Linda Pollard RN  Body Position: position changed independently  Taken 6/11/2025 1200 by Linda Pollard RN  Body Position: position changed independently  Taken 6/11/2025 1000 by Linda Pollard RN  Body Position: position changed independently  Taken 6/11/2025 0800 by Linda Pollard RN  Body Position: position changed independently  Intervention: Prevent Infection  Recent Flowsheet Documentation  Taken 6/11/2025 1200 by Linda Pollard RN  Infection Prevention: environmental surveillance performed  Goal: Optimal Comfort and Wellbeing  Outcome:  Progressing  Goal: Readiness for Transition of Care  Outcome: Progressing     Problem: Skin Injury Risk Increased  Goal: Skin Health and Integrity  Outcome: Progressing  Intervention: Optimize Skin Protection  Recent Flowsheet Documentation  Taken 6/11/2025 1600 by Linda Pollard RN  Activity Management: activity encouraged  Head of Bed (HOB) Positioning: HOB elevated  Taken 6/11/2025 1400 by Linda Pollard RN  Activity Management: activity encouraged  Head of Bed (HOB) Positioning: HOB elevated  Taken 6/11/2025 1200 by Linda Pollard RN  Activity Management: activity encouraged  Head of Bed (HOB) Positioning: HOB elevated  Taken 6/11/2025 1000 by Linda Pollard RN  Activity Management: activity encouraged  Head of Bed (HOB) Positioning: HOB elevated  Taken 6/11/2025 0800 by Linda Pollard RN  Activity Management: activity encouraged  Head of Bed (HOB) Positioning: HOB elevated     Problem: Comorbidity Management  Goal: Maintenance of Behavioral Health Symptom Control  Outcome: Progressing  Intervention: Maintain Behavioral Health Symptom Control  Recent Flowsheet Documentation  Taken 6/11/2025 0800 by Linda Pollard RN  Medication Review/Management: medications reviewed  Goal: Maintenance of COPD Symptom Control  Outcome: Progressing  Intervention: Maintain COPD (Chronic Obstructive Pulmonary Disease) Symptom Control  Recent Flowsheet Documentation  Taken 6/11/2025 0800 by Linda Pollard RN  Medication Review/Management: medications reviewed  Goal: Maintenance of Heart Failure Symptom Control  Outcome: Progressing  Intervention: Maintain Heart Failure Management  Recent Flowsheet Documentation  Taken 6/11/2025 0800 by Linda Pollard RN  Medication Review/Management: medications reviewed  Goal: Blood Pressure in Desired Range  Outcome: Progressing  Intervention: Maintain Blood Pressure Management  Recent Flowsheet Documentation  Taken 6/11/2025 0800 by Linda Pollard RN  Medication  Review/Management: medications reviewed     Problem: Fall Injury Risk  Goal: Absence of Fall and Fall-Related Injury  Outcome: Progressing  Intervention: Identify and Manage Contributors  Recent Flowsheet Documentation  Taken 6/11/2025 0800 by Linda Pollard RN  Medication Review/Management: medications reviewed  Intervention: Promote Injury-Free Environment  Recent Flowsheet Documentation  Taken 6/11/2025 1600 by Linda Pollard RN  Safety Promotion/Fall Prevention:   nonskid shoes/slippers when out of bed   safety round/check completed  Taken 6/11/2025 1400 by Linda Pollard RN  Safety Promotion/Fall Prevention:   nonskid shoes/slippers when out of bed   safety round/check completed  Taken 6/11/2025 1200 by Linda Pollard RN  Safety Promotion/Fall Prevention:   nonskid shoes/slippers when out of bed   safety round/check completed  Taken 6/11/2025 1000 by Linda Pollard RN  Safety Promotion/Fall Prevention:   nonskid shoes/slippers when out of bed   safety round/check completed  Taken 6/11/2025 0800 by Linda Pollard RN  Safety Promotion/Fall Prevention:   nonskid shoes/slippers when out of bed   safety round/check completed     Problem: Sepsis/Septic Shock  Goal: Optimal Coping  Outcome: Progressing  Goal: Absence of Bleeding  Outcome: Progressing  Goal: Blood Glucose Level Within Target Range  Outcome: Progressing  Goal: Absence of Infection Signs and Symptoms  Outcome: Progressing  Intervention: Initiate Sepsis Management  Recent Flowsheet Documentation  Taken 6/11/2025 1200 by Linda Pollard RN  Infection Prevention: environmental surveillance performed  Intervention: Promote Recovery  Recent Flowsheet Documentation  Taken 6/11/2025 1600 by Linda Pollard RN  Activity Management: activity encouraged  Taken 6/11/2025 1400 by Linda Pollard RN  Activity Management: activity encouraged  Taken 6/11/2025 1200 by Linda Pollard RN  Activity Management: activity encouraged  Taken 6/11/2025 1000 by  Linda Pollard, RN  Activity Management: activity encouraged  Taken 6/11/2025 0800 by Linda Pollard, RN  Activity Management: activity encouraged  Goal: Optimal Nutrition Delivery  Outcome: Progressing   Goal Outcome Evaluation:

## 2025-06-11 NOTE — CASE MANAGEMENT/SOCIAL WORK
Met with pt and her daughter to explain [facility] swing[facility] would have bed available tomorrow, not today. IMM obtained.

## 2025-06-11 NOTE — CONSULTS
"Goal Outcome Evaluation:         Inpatient Palliative CareTeam Consult received for Goals of Care Discussion/ACP, Hospice Referral Discussion    Pt is CODE STATUS of CPR, Limited Support--No Intubation Pt does not have a POA or Living Will in the EMR..    Pt admitted via ED with SOA, Confusion.      She is a readmission from admission 5/12/25--5/16/2025 for Chroinic Hypoxic Hypercapnic Respiratory Failure with Acute COPD exacerbation.  Goals of Care Discussion was initiated that admission with pt deciding on CODE STATUS of DNR/DNI  It was documented \"if worsening or decline (pt ) would consider hospice services\".    Pt lives with daughter and the confusion was reported as increasing for last day or so.  Pt was found to be arousable but encephalopathic.  Pt was placed on BiPAP in ED.    Admission Assessment:   COPD exacerbation, Acute on Chronic Hypoxic/Hypercapnic Resp Failure, Acute Metabolic Encephalopathy, Hx Paroxysmal A. Fib/Flutter, Hx Diastolic/Systolic CHF, Anxiety depression, Hx medical non-compliance.      PMHx includes: Adrenal Adenoma, Anemia, Arrhythmia, Asthma, A. Fib, Benign tumor of adrenal gland, Chronic Bronchitis with COPD, CAD, Depression, Gastritis, GERD,   HTN, Fibromyalia, Kidney stone, Osteopenia, Skin cancer.    Attempted to visit pt yesterday 6/10/2025 but pt did not want to talk.  No family present.  When asked she said I could call her daughter.  Informed her I would visit again tomorrow and if she did not feel like speaking would call her daughter.    1200 Visited pt today.  She was sitting on side of bed and when I inquired if she was going to try to get up--she said \"No\".  I reminded her I was there yesterday and am with the Palliative Care Team that visits pts with chronic conditions and make sure that we are doing what she wants and not doing what she doesn't want done.     When asked pt reported having pain and that she usually does have pain with Fibromyalgia. PT has had one does " "of Percocet 7.5/325mg  yesterday.  None thus far today.  Pt invited me to sit down in a chair which I did.  She told me she lives with her daughter and granddaughter who is 13 yrs old.  She smiles when she speaks of her granddaughter who is named \"Rea\". She added that she was named after a family member.      Pt then felt she needed to lay down.  Complained of nausea. I offered to tell her nurse about the nausea but she reported her nurse knows when to bring it.  I asked if she would like me to call and talk with her daughter--she did.  Pt had Zofran 4mg IV at 0737 today.    1420  Called pt's daughter, Malu Aguilar [phone], but had to leave a voice message.    PC will continue to follow                                      "

## 2025-06-12 VITALS
WEIGHT: 131 LBS | RESPIRATION RATE: 20 BRPM | DIASTOLIC BLOOD PRESSURE: 64 MMHG | HEART RATE: 62 BPM | TEMPERATURE: 97.8 F | OXYGEN SATURATION: 95 % | HEIGHT: 65 IN | SYSTOLIC BLOOD PRESSURE: 156 MMHG | BODY MASS INDEX: 21.83 KG/M2

## 2025-06-12 PROCEDURE — 94761 N-INVAS EAR/PLS OXIMETRY MLT: CPT

## 2025-06-12 PROCEDURE — 99239 HOSP IP/OBS DSCHRG MGMT >30: CPT | Performed by: NURSE PRACTITIONER

## 2025-06-12 PROCEDURE — 94664 DEMO&/EVAL PT USE INHALER: CPT

## 2025-06-12 PROCEDURE — 63710000001 PREDNISONE PER 1 MG: Performed by: NURSE PRACTITIONER

## 2025-06-12 PROCEDURE — 94799 UNLISTED PULMONARY SVC/PX: CPT

## 2025-06-12 PROCEDURE — 63710000001 REVEFENACIN 175 MCG/3ML SOLUTION: Performed by: FAMILY MEDICINE

## 2025-06-12 PROCEDURE — 25010000002 ONDANSETRON PER 1 MG: Performed by: FAMILY MEDICINE

## 2025-06-12 RX ORDER — LOPERAMIDE HYDROCHLORIDE 2 MG/1
2 CAPSULE ORAL 4 TIMES DAILY PRN
Start: 2025-06-12

## 2025-06-12 RX ORDER — CLONAZEPAM 0.5 MG/1
0.5 TABLET ORAL 2 TIMES DAILY PRN
Start: 2025-06-12 | End: 2025-06-15

## 2025-06-12 RX ORDER — QUETIAPINE FUMARATE 25 MG/1
12.5 TABLET, FILM COATED ORAL EVERY 6 HOURS PRN
Start: 2025-06-12

## 2025-06-12 RX ORDER — LOPERAMIDE HYDROCHLORIDE 2 MG/1
2 CAPSULE ORAL 4 TIMES DAILY PRN
Status: DISCONTINUED | OUTPATIENT
Start: 2025-06-12 | End: 2025-06-12 | Stop reason: HOSPADM

## 2025-06-12 RX ORDER — PREDNISONE 20 MG/1
40 TABLET ORAL
Qty: 6 TABLET | Refills: 0 | Status: SHIPPED | OUTPATIENT
Start: 2025-06-12 | End: 2025-06-15

## 2025-06-12 RX ORDER — METOPROLOL SUCCINATE 50 MG/1
50 TABLET, EXTENDED RELEASE ORAL DAILY
Start: 2025-06-12

## 2025-06-12 RX ADMIN — LOSARTAN POTASSIUM 25 MG: 25 TABLET, FILM COATED ORAL at 08:59

## 2025-06-12 RX ADMIN — REVEFENACIN 175 MCG: 175 SOLUTION RESPIRATORY (INHALATION) at 07:16

## 2025-06-12 RX ADMIN — PREDNISONE 40 MG: 20 TABLET ORAL at 09:04

## 2025-06-12 RX ADMIN — AMIODARONE HYDROCHLORIDE 200 MG: 200 TABLET ORAL at 08:59

## 2025-06-12 RX ADMIN — ACETAMINOPHEN 650 MG: 325 TABLET, FILM COATED ORAL at 09:08

## 2025-06-12 RX ADMIN — ONDANSETRON 4 MG: 2 INJECTION INTRAMUSCULAR; INTRAVENOUS at 02:42

## 2025-06-12 RX ADMIN — ATORVASTATIN CALCIUM 40 MG: 40 TABLET, FILM COATED ORAL at 09:00

## 2025-06-12 RX ADMIN — SERTRALINE 100 MG: 50 TABLET, FILM COATED ORAL at 09:05

## 2025-06-12 RX ADMIN — CLONAZEPAM 0.5 MG: 0.5 TABLET ORAL at 10:52

## 2025-06-12 RX ADMIN — Medication 10 ML: at 09:12

## 2025-06-12 RX ADMIN — FUROSEMIDE 20 MG: 20 TABLET ORAL at 09:00

## 2025-06-12 RX ADMIN — DULOXETINE 60 MG: 30 CAPSULE, DELAYED RELEASE ORAL at 09:00

## 2025-06-12 RX ADMIN — BUDESONIDE 0.5 MG: 0.5 SUSPENSION RESPIRATORY (INHALATION) at 07:16

## 2025-06-12 RX ADMIN — OXYCODONE HYDROCHLORIDE AND ACETAMINOPHEN 1 TABLET: 7.5; 325 TABLET ORAL at 06:12

## 2025-06-12 RX ADMIN — APIXABAN 5 MG: 5 TABLET, FILM COATED ORAL at 09:00

## 2025-06-12 RX ADMIN — PANTOPRAZOLE SODIUM 40 MG: 40 TABLET, DELAYED RELEASE ORAL at 09:05

## 2025-06-12 RX ADMIN — ARFORMOTEROL TARTRATE 15 MCG: 15 SOLUTION RESPIRATORY (INHALATION) at 07:16

## 2025-06-12 NOTE — PLAN OF CARE
Goal Outcome Evaluation:  Plan of Care Reviewed With: patient           Outcome Evaluation: VSS, PT USING BIPAP FOR 2 HOUR INTERVALS THIS SHIFT,  O2 AT 4 LITERS N/C OTHERWISE,  NO ACUTE EVENTS OVERNIGHT.

## 2025-06-12 NOTE — DISCHARGE SUMMARY
[facility]   DISCHARGE SUMMARY      Name:  Gabi Dudley   Age:  80 y.o.  Sex:  female  :  1945  MRN:  [id number]   Visit Number:  [id number]    Admission Date:  2025  Date of Discharge:  2025  Primary Care Physician:  Krystina Saunders, DO      Discharge Diagnoses:     COPD exacerbation  Acute on chronic hypoxic/hypercapnic respiratory failure  Acute metabolic encephalopathy likely secondary to #2  History of paroxysmal atrial fibrillation/flutter on Eliquis  History of diastolic/systolic CHF  Anxiety depression  History of medical noncompliance    Problem List:     Active Hospital Problems    Diagnosis  POA    **Acute on chronic respiratory failure with hypoxia and hypercapnia [J96.21, J96.22]  Yes      Resolved Hospital Problems   No resolved problems to display.     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  Apparently she is currently living with daughter, had increasing confusion for the last day or so.  Weakness, more short of breath than typical.  Daughter stated patient compliant with BiPAP for several days after discharge and then started feeling better, became noncompliant.     In the ER she was on BiPAP, heart rate in the 55 range afebrile blood pressure 130/80.  CBC with a white count of 3.8, hemoglobin 9 platelets 188.  CMP creatinine 0.49 with a sodium 146 CO2 of 44.  Glucose of  81.  Lactic acid 0.5.  VBG pH 7.29 with base of 204.  Urinalysis unremarkable.  Chest x-ray appears similar to x-ray from May, no acute opacity.  Head CT reportedly unremarkable.  Procalcitonin, UDS, respiratory panel negative.  Patient given DuoNeb, Solu-Medrol, and placed on BiPAP.  Hospitalist consulted for further medical management.  Palliative health was also consulted during admission.  Patient is DNR/DNI.     Patient with end-stage COPD.  CO2 and confusion improved.  Klonopin dose decreased which may have been contributing to hypercapnia as well.  PT/OT/case management consulted as patient agreeable for short-term rehab.She will continue 3 days of prednisone. Seroquel as needed for AVAPS use.Encouraged to use AVAPS at night and during the day on 2 hours and off 1. Strict return precautions.    Vital Signs:    Temp:  [97.8 °F (36.6 °C)-98.5 °F (36.9 °C)] 97.8 °F (36.6 °C)  Heart Rate:  [54-64] 64  Resp:  [16-20] 20  BP: (122-174)/(51-73) 156/64    Physical Exam:    General Appearance:  Alert and cooperative.  Middle-age female.   Head:  Atraumatic and normocephalic.   Eyes: Conjunctivae and sclerae normal, no icterus. No pallor.   Ears:  Ears with no abnormalities noted.   Throat: No oral lesions, no thrush, oral mucosa moist.   Neck: Supple, trachea midline, no thyromegaly.   Back:   No kyphoscoliosis present. No tenderness to palpation.   Lungs:   Breath sounds heard bilaterally equally.  No crackles or wheezing. No Pleural rub or bronchial breathing.  4 L nasal cannula unlabored.   Heart:  Normal S1 and S2, no murmur, no gallop, no rub. No JVD.   Abdomen:   Normal bowel sounds, no masses, no organomegaly. Soft, nontender, nondistended, no rebound tenderness.   Extremities: Supple, no edema, no cyanosis, no clubbing.   Pulses: Pulses palpable bilaterally.   Skin: No bleeding or rash.  Scattered ecchymosis.   Neurologic: Alert and oriented x 3. No facial asymmetry. Moves all four limbs.  Generalized  weakness, tremors.      Pertinent Lab Results:     Results from last 7 days   Lab Units 06/10/25  0658 06/09/25  0737 06/08/25  0707 06/07/25  0314   SODIUM mmol/L 143 143 145 146*   POTASSIUM mmol/L 3.9 3.9 3.9 4.2   CHLORIDE mmol/L 100 98 98 97*   CO2 mmol/L 37.1* 35.3* 36.3* 44.0*   BUN mg/dL 30.0* 28.0* 25.0* 22.0   CREATININE mg/dL 0.54* 0.51* 0.56* 0.49*   CALCIUM mg/dL 9.4 9.2 9.5 9.5   BILIRUBIN mg/dL  --   --   --  0.2   ALK PHOS U/L  --   --   --  86   ALT (SGPT) U/L  --   --   --  34*   AST (SGOT) U/L  --   --   --  34*   GLUCOSE mg/dL 112* 94 101* 81     Results from last 7 days   Lab Units 06/10/25  0658 06/09/25 0737 06/08/25 0707   WBC 10*3/mm3 6.36 7.50 6.84   HEMOGLOBIN g/dL 9.2* 9.5* 8.5*   HEMATOCRIT % 30.5* 31.8* 28.9*   PLATELETS 10*3/mm3 245 255 228             Results from last 7 days   Lab Units 06/07/25 0314   PROBNP pg/mL 2,739.0*                       Pertinent Radiology Results:    Imaging Results (All)       Procedure Component Value Units Date/Time    XR Chest 1 View [[id number]] Collected: 06/07/25 0803     Updated: 06/07/25 0918    Narrative:      CHEST SINGLE VIEW     COMPARISON: Chest from 05/12/2025.     HISTORY: Mental status change.     FINDINGS: Cardiac silhouette is mildly enlarged.     Film was obtained with lordotic positioning. Mild coarse interstitial  opacity is seen in both lungs, likely chronic.       Impression:      Coarse, chronic-appearing interstitial opacity in both  lungs.        This report was signed and finalized on 6/7/2025 9:16 AM by Moustapha Lambert MD.       CT Head Without Contrast [[id number]] Collected: 06/07/25 0629     Updated: 06/07/25 0630    Narrative:      FINAL REPORT    TECHNIQUE:  null    CLINICAL HISTORY:  Encephalopathy, AMS, SOA    COMPARISON:  null    FINDINGS:  CT head without contrast    Comparison: 05/12/2025.    Findings:    Unenhanced CT survey of head performed in axial plane. Coronal and sagittal reconstruction images obtained.    No  intra-or extra-axial hemorrhage, deep white matter edema, or mass effect including midline shift. Generalized atrophy. Scattered bilateral white matter lucencies particularly periventricular nonspecific but most likely related to chronic ischemic   changes. Atherosclerosis. Mild mucosal thickening right side sphenoid sinus decreased. Minimal mucosal thickening bilateral ethmoid air cells decreased. Mastoid air cells are essentially clear. No fracture seen.      Impression:      Impression:    1. Negative for acute intracranial CT abnormality.    2. Mild mucosal thickening paranasal sinuses decreased.    Authenticated and Electronically Signed by Rex Severino MD on  06/07/2025 06:29:50 AM            Echo:    Results for orders placed during the hospital encounter of 05/03/25    Adult Transthoracic Echo Complete W/ Cont if Necessary Per Protocol    Interpretation Summary    Left ventricular systolic function is normal. Calculated left ventricular EF = 57.3%    Left ventricular wall thickness is consistent with mild septal asymmetric hypertrophy.    Left ventricular diastolic function was indeterminate.    Moderately reduced right ventricular systolic function noted.    Left atrial volume is mildly increased.    Mild aortic valve stenosis is present.    Estimated right ventricular systolic pressure from tricuspid regurgitation is normal (<35 mmHg).    Saline test results are negative for intracardiac shunting.    Condition on Discharge:      Stable.    Code status during the hospital stay:    Code Status and Medical Interventions: No CPR (Do Not Attempt to Resuscitate); Limited Support; No intubation (DNI)   Ordered at: 06/07/25 0544     Code Status (Patient has no pulse and is not breathing):    No CPR (Do Not Attempt to Resuscitate)     Medical Interventions (Patient has pulse or is breathing):    Limited Support     Medical Intervention Limits:    No intubation (DNI)     Discharge Disposition:    Rehab Facility or  Unit (DC - External)    Discharge Medications:       Discharge Medications        New Medications        Instructions Start Date   clonazePAM 0.5 MG tablet  Commonly known as: KlonoPIN   0.5 mg, Oral, 2 Times Daily PRN      loperamide 2 MG capsule  Commonly known as: IMODIUM   2 mg, Oral, 4 Times Daily PRN      predniSONE 20 MG tablet  Commonly known as: DELTASONE   40 mg, Oral, Daily With Breakfast      QUEtiapine 25 MG tablet  Commonly known as: SEROquel   12.5 mg, Oral, Every 6 Hours PRN             Changes to Medications        Instructions Start Date   albuterol sulfate  (90 Base) MCG/ACT inhaler  Commonly known as: PROVENTIL HFA;VENTOLIN HFA;PROAIR HFA  What changed: Another medication with the same name was removed. Continue taking this medication, and follow the directions you see here.   2 puffs, Inhalation, Every 4 Hours PRN      metoprolol succinate XL 50 MG 24 hr tablet  Commonly known as: TOPROL-XL  What changed:   medication strength  how much to take   50 mg, Oral, Daily             Continue These Medications        Instructions Start Date   amiodarone 200 MG tablet  Commonly known as: PACERONE   200 mg, Oral, Every 24 Hours Scheduled      apixaban 5 MG tablet tablet  Commonly known as: Eliquis   5 mg, Oral, Every 12 Hours Scheduled      atorvastatin 40 MG tablet  Commonly known as: Lipitor   40 mg, Oral, Daily      benzonatate 100 MG capsule  Commonly known as: TESSALON   100 mg, Oral, 3 Times Daily PRN      Breztri Aerosphere 160-9-4.8 MCG/ACT aerosol inhaler  Generic drug: Budeson-Glycopyrrol-Formoterol   2 puffs, Inhalation, 2 Times Daily, Rinse mouth out after use      Diclofenac Sodium 1 % gel gel  Commonly known as: VOLTAREN   4 g, Topical, 4 Times Daily PRN      DULoxetine 60 MG capsule  Commonly known as: CYMBALTA   60 mg, Oral, 2 Times Daily      fluticasone 50 MCG/ACT nasal spray  Commonly known as: FLONASE   2 sprays, Nasal, Daily      furosemide 20 MG tablet  Commonly known as:  LASIX   20 mg, Oral, Daily PRN      ipratropium-albuterol 0.5-2.5 mg/3 ml nebulizer  Commonly known as: DUO-NEB   USE 3 mL VIA NEBULIZER EVERY 4 HOURS AS NEEDED FOR WHEEZING      losartan 25 MG tablet  Commonly known as: Cozaar   25 mg, Oral, Daily      meclizine 25 MG tablet  Commonly known as: ANTIVERT   25 mg, Oral, 3 Times Daily PRN      O2  Commonly known as: OXYGEN   4 L/min, Inhalation, As Needed      ondansetron ODT 8 MG disintegrating tablet  Commonly known as: ZOFRAN-ODT   8 mg, Translingual, Every 8 Hours PRN      oxyCODONE-acetaminophen 7.5-325 MG per tablet  Commonly known as: PERCOCET   1 tablet, Oral, Every 4 Hours PRN      pantoprazole 40 MG EC tablet  Commonly known as: PROTONIX   40 mg, Oral, Daily      prochlorperazine 5 MG tablet  Commonly known as: COMPAZINE   5 mg, Oral, Every 6 Hours PRN      saline gel nasal gel   1 Application, Topical, As Needed      sertraline 100 MG tablet  Commonly known as: ZOLOFT   TAKE 1 & 1/2 TABLETS BY MOUTH DAILY             Stop These Medications      clotrimazole-betamethasone 1-0.05 % cream  Commonly known as: Lotrisone     diazePAM 5 MG tablet  Commonly known as: Valium     levocetirizine 5 MG tablet  Commonly known as: XYZAL     naloxone 4 MG/0.1ML nasal spray  Commonly known as: NARCAN     traZODone 50 MG tablet  Commonly known as: DESYREL            Discharge Diet:     Diet Instructions       Diet: Cardiac Diets; Healthy Heart (2-3 Na+); Thin (IDDSI 0)      Discharge Diet: Cardiac Diets    Cardiac Diet: Healthy Heart (2-3 Na+)    Fluid Consistency: Thin (IDDSI 0)          Activity at Discharge:     Activity Instructions       Activity as Tolerated            Follow-up Appointments:    Additional Instructions for the Follow-ups that You Need to Schedule       Ambulatory Referral to Disease State Management   As directed      [facility] dept: [facility] [[id number]]   What program(s) are you referring for?: COPD               Contact information for  follow-up providers       Krystina Saunders DO .    Specialty: Family Medicine  Contact information:  [address]  [facility] [address]  [phone]                       Contact information for after-discharge care       Destination       [facility] .    Service: Skilled Nursing  Contact information:  [address] [facility] [address]  [phone]                                 Future Appointments   Date Time Provider Department Center   8/25/2025  3:15 PM Taiwo Curry MD [facility]     Test Results Pending at Discharge:    Pending Results       None                 Britt Nascimento, APRN  06/12/25  08:53 EDT    Time: I spent >30 minutes on this discharge activity which included: face-to-face encounter with the patient, reviewing the data in the system, coordination of the care with the nursing staff as well as consultants, documentation, and entering orders.     Dictated utilizing Dragon dictation.

## 2025-06-12 NOTE — PROGRESS NOTES
RT EQUIPMENT DEVICE RELATED - SKIN ASSESSMENT    Jed Score:  Jed Score: 16     RT Medical Equipment/Device:     NIV Mask:  Full-face    size: m    Skin Assessment:      Nose:  Intact    Device Skin Pressure Protection:  Pressure points protected    Nurse Notification:  No    Stanford Celeste, RRT

## 2025-06-12 NOTE — PLAN OF CARE
Problem: Noninvasive Ventilation Acute  Goal: Effective Unassisted Ventilation and Oxygenation  Outcome: Progressing  Intervention: Monitor and Manage Noninvasive Ventilation  Flowsheets (Taken 6/12/2025 0010)  Airway/Ventilation Management:   positive pressure ventilation provided   oxygen therapy provided   Goal Outcome Evaluation:

## 2025-06-12 NOTE — CASE MANAGEMENT/SOCIAL WORK
Case Management Discharge Note                Selected Continued Care - Admitted Since 6/7/2025       Destination Coordination complete.      Service Provider Services Address Phone Fax Patient Preferred    [facility] [address] [place] [address] [phone] --              Durable Medical Equipment    No services have been selected for the patient.                Dialysis/Infusion    No services have been selected for the patient.                Home Medical Care    No services have been selected for the patient.                Therapy    No services have been selected for the patient.                Community Resources    No services have been selected for the patient.                Community & DME    No services have been selected for the patient.                    Selected Continued Care - Episodes Includes continued care and service providers with selected services from the active episodes listed below      Ambulatory Social Work Case Management Episode start date: 6/6/2025      Community & DME       Service Provider Services Address Phone Fax Patient Preferred    [facility] Community Support/Recreation [address] [facility] [address] [phone] -- --                          Selected Continued Care - Prior Encounters Includes continued care and service providers with selected services from prior encounters from 3/9/2025 to 6/12/2025      Discharged on 5/16/2025 Admission date: 5/12/2025 - Discharge disposition: Home-Health Care Svc      Durable Medical Equipment       Service Provider Services Address Phone Fax Patient Preferred    [facility] [employer] [facility] [employer] [address] [place] [address] [place] [address] [phone] --       Internal Comment last updated by Janiya Sotelo, RN 5/16/2025 1506    NIPPV will be delivered to pts home after discharge.                         Home  Medical Care       Service Provider Services Address Phone Fax Patient Preferred    [facility] [address], [address] [address] [phone] --                      Discharged on 5/6/2025 Admission date: 5/3/2025 - Discharge disposition: Home-Health Care [facility]      Home Medical Care       Service Provider Services Address Phone Fax Patient Preferred    [facility] [address], [address] [address] [phone] --                          Transportation Services  Ambulance: [facility]    Final Discharge Disposition Code: 61 - hospital-based swing bed

## 2025-06-12 NOTE — PLAN OF CARE
Goal Outcome Evaluation: Patient being discharged to [facility] bed via EMS today

## 2025-06-14 NOTE — PLAN OF CARE
Goal Outcome Evaluation:      Visited pt briefly this am.  Plan is for pt to DC to [facility] for short term rehab.  Daughter present at time of visit helping pt dress.    Pt discharged to [facility] 6/12/2025 @ 1132 via [facility].    EMS/DNR was signed by daughter prior to transport.

## 2025-06-19 ENCOUNTER — READMISSION MANAGEMENT (OUTPATIENT)
Dept: CALL CENTER | Facility: HOSPITAL | Age: 80
End: 2025-06-19
Payer: MEDICARE

## 2025-06-19 ENCOUNTER — PATIENT OUTREACH (OUTPATIENT)
Dept: CASE MANAGEMENT | Facility: OTHER | Age: 80
End: 2025-06-19
Payer: MEDICARE

## 2025-06-19 DIAGNOSIS — J44.1 COPD EXACERBATION: ICD-10-CM

## 2025-06-19 DIAGNOSIS — J96.22 ACUTE ON CHRONIC RESPIRATORY FAILURE WITH HYPOXIA AND HYPERCAPNIA: Primary | ICD-10-CM

## 2025-06-19 DIAGNOSIS — J96.21 ACUTE ON CHRONIC RESPIRATORY FAILURE WITH HYPOXIA AND HYPERCAPNIA: Primary | ICD-10-CM

## 2025-06-19 NOTE — OUTREACH NOTE
Prep Survey      Flowsheet Row Responses   [facility] facility patient discharged from? Non-BH   Is LACE score < 7 ? Non-BH Discharge   Eligibility [facility]   H[facility]   Date of Admission 06/12/25   Date of Discharge 06/23/25   Discharge Disposition Home-Health Care [facility]   Discharge diagnosis COPD exacerbation   Does the patient have one of the following disease processes/diagnoses(primary or secondary)? COPD   Does the patient have Home health ordered? Yes   What is the Home health agency?  [facility]   General alerts for this patient 40 feet with RW and SBA, O2@4L cont   Prep survey completed? Yes            Queta FERRERA - Registered Nurse

## 2025-06-19 NOTE — OUTREACH NOTE
AMBULATORY CASE MANAGEMENT NOTE    Names and Relationships of Patient/Support Persons: Contact: Larissa-KAYLA Frye with [facility]; Relationship: Other -     Care Coordination    Care coordination with Larissa, Swing Bed Coordinator with [facility]. Larissa states that Patient will discharge possibly on Friday, June 20th but most likely on Monday, June 23rd. She declined any assistance with scheduling post discharge follow up and reported no medication changes.    SNF Follow-up    Questions/Answers      Flowsheet Row Responses   Acute Facility Discharged From [facility]   Acute Discharge Date 06/12/25   Acute Facility Diagnoses COPD Exacerbation   Name of the Skilled Nursing Facility? [facility] at [facility]   Purpose of SNF Admission PT, OT   Estimated length of stay for the patient? 10 days   Who is the insurance provider or payor of patient stay? Medicare   Progression of Patient? 40 feet with RW and SBA, O2@4L cont   Skilled Nursing Discharge Date? 06/23/25   Where was the patient discharged to? Home   Home Health Agency at discharge? Yes   Name of Home Health Agency? [facility]   DME Needed at Discharge? No   Are there any medication concerns at Discharge No   Does the patient have a follow-up appointment? No          Soledad JACOBS  Ambulatory Case Management    6/19/2025, 14:56 EDT

## 2025-06-23 ENCOUNTER — TELEPHONE (OUTPATIENT)
Dept: INTERNAL MEDICINE | Facility: CLINIC | Age: 80
End: 2025-06-23
Payer: MEDICARE

## 2025-06-23 NOTE — TELEPHONE ENCOUNTER
Caller: [facility] [employer]    Relationship:     Best call back number: [phone]    What orders are you requesting (i.e. lab or imaging):     DISCHARGED [facility] L[facility]PEZ 6/20    NEEDS NURSING, PHYSICAL THERAPY AND OCCUPATIONAL THERAPY. NEEDS VERBAL CARICO WILL FOLLOW HOME H

## 2025-06-24 ENCOUNTER — TRANSITIONAL CARE MANAGEMENT TELEPHONE ENCOUNTER (OUTPATIENT)
Dept: CALL CENTER | Facility: HOSPITAL | Age: 80
End: 2025-06-24
Payer: MEDICARE

## 2025-06-24 NOTE — OUTREACH NOTE
Call Center TCM Note      Flowsheet Row Responses   Mu-ism facility patient discharged from? Non-BH   Does the patient have one of the following disease processes/diagnoses(primary or secondary)? COPD   TCM attempt successful? No   Unsuccessful attempts Attempt 2            Zayda Olguin Registered Nurse    6/24/2025, 15:38 EDT

## 2025-06-24 NOTE — OUTREACH NOTE
Call Center TCM Note      Flowsheet Row Responses   [facility] patient discharged from? Non-  [[facility]   Does the patient have one of the following disease processes/diagnoses(primary or secondary)? COPD   TCM attempt successful? No  [[facility]   Unsuccessful attempts Attempt 1            Zayda Olguin Registered Nurse    6/24/2025, 13:48 EDT

## 2025-06-25 ENCOUNTER — TRANSITIONAL CARE MANAGEMENT TELEPHONE ENCOUNTER (OUTPATIENT)
Dept: CALL CENTER | Facility: HOSPITAL | Age: 80
End: 2025-06-25
Payer: MEDICARE

## 2025-06-25 NOTE — OUTREACH NOTE
Call Center TCM Note      Flowsheet Row Responses   [facility] patient discharged from? [facility]BH  [facility]   Does the patient have one of the following disease processes/diagnoses(primary or secondary)? Other   TCM attempt successful? Yes   Call start time 1434   Call end time 1436   Discharge diagnosis COPD exacerbation   Does the patient have all medications ordered at discharge? Yes   Is the patient taking all medications as directed (includes completed medication regime)? Yes   Medication comments Pt unsure on all meds - daughter helps. She states she is taking her meds and there has been some changes.   Comments HOSP DC FU appt 6/26/25 1130   Does the patient have an appointment with their PCP within 7-14 days of discharge? Yes   What is the Home health agency?  [facility]   Has home health visited the patient within 72 hours of discharge? Unsure   Psychosocial issues? No   Did the patient receive a copy of their discharge instructions? Yes   Nursing interventions Reviewed instructions with patient   What is the patient's perception of their health status since discharge? Improving   Is the patient/caregiver able to teach back the hierarchy of who to call/visit for symptoms/problems? PCP, Specialist, Home health nurse, Urgent Care, ED, 911 Yes   TCM call completed? Yes   Wrap up additional comments Pt reports she is feeling better. Pt states daughter takes care of Appts and meds.   Call end time 1436            ALL FERRERA - Registered Nurse    6/25/2025, 14:37 EDT

## 2025-06-26 ENCOUNTER — PATIENT OUTREACH (OUTPATIENT)
Dept: CASE MANAGEMENT | Facility: OTHER | Age: 80
End: 2025-06-26
Payer: MEDICARE

## 2025-06-26 DIAGNOSIS — J96.22 ACUTE ON CHRONIC RESPIRATORY FAILURE WITH HYPOXIA AND HYPERCAPNIA: Primary | ICD-10-CM

## 2025-06-26 DIAGNOSIS — J96.21 ACUTE ON CHRONIC RESPIRATORY FAILURE WITH HYPOXIA AND HYPERCAPNIA: Primary | ICD-10-CM

## 2025-06-26 DIAGNOSIS — J44.1 COPD EXACERBATION: ICD-10-CM

## 2025-06-26 NOTE — PLAN OF CARE
Problem: COPD  Goal: Track and Manage My Symptoms  Outcome: Progressing  Intervention: My COPD Symptoms To Do List  Description: Why is this important?Tracking your symptoms and other information about your health helps your doctor plan your care.Write down the symptoms, the time of day, what you were doing and what medicine you are taking.You will soon learn how to manage your symptoms.  Flowsheets (Taken 6/26/2025 1400)  My COPD Symptoms To Do List:   arrange in-home help services   follow rescue plan if symptoms flare up   use an extra pillow to sleep   keep follow-up appointments  Goal: Track and Manage My Triggers  Outcome: Progressing  Intervention: My COPD Triggers To Do List  Description: Why is this important?Triggers are activities or things, like tobacco smoke or cold weather, that make your COPD (chronic obstructive pulmonary disease) flare up.Knowing these triggers helps you plan how to stay away from them.When you cannot remove them, you can learn how to manage them.  Flowsheets (Taken 6/26/2025 1400)  My COPD Triggers To Do List: listen for public air quality announcements every day  Goal: Manage My Fatigue (Tiredness)  Outcome: Progressing  Intervention: My Fatigue Management To Do List  Description: Why is this important?Feeling tired or worn out is a common symptom of COPD (chronic obstructive pulmonary disease).Learning when you feel your best and when you need rest is important.Managing the tiredness (fatigue) will help you be active and enjoy life.  Flowsheets (Taken 6/26/2025 1400)  My Fatigue Management To Do List:   keep bedroom cool and dark   limit daytime naps   use a fan or white noise in bedroom  Goal: Learn and Do Breathing Exercises  Outcome: Progressing  Intervention: My Breathing Exercises To Do List  Description: Why is this important?Breathing exercises can help lessen the cough that comes with chronic obstructive pulmonary disease.Doing the exercises will give you more  energy.They will also help you to control your symptoms.  Flowsheets (Taken 6/26/2025 1400)  My Breathing Exercises To Do List: do exercises in a comfortable position that makes breathing as easy as possible  Goal: Optimal Care Coordination of a Patient Experiencing COPD  Outcome: Progressing  Intervention: Support Psychosocial Response to COPD  Flowsheets (Taken 6/26/2025 1400)  Support Psychosocial Response to COPD:   caregiver stress acknowledged   decision-making supported   emotional support provided   verbalization of feelings encouraged  Intervention: Alleviate Barriers to COPD Management  Flowsheets (Taken 6/26/2025 1400)  Alleviate Barriers to COPD Management:   breathing techniques encouraged   self-awareness of symptom triggers encouraged  Intervention: Identify and Minimize Risk of COPD Exacerbation  Flowsheets (Taken 6/26/2025 1400)  Identify and Minimize Risk of COPD Exacerbation:   rescue (action) plan reviewed   signs/symptoms of worsening disease assessed     Problem: COPD  Goal: Track and Manage My Symptoms  Outcome: Progressing  Intervention: My COPD Symptoms To Do List  Description: Why is this important?Tracking your symptoms and other information about your health helps your doctor plan your care.Write down the symptoms, the time of day, what you were doing and what medicine you are taking.You will soon learn how to manage your symptoms.  Flowsheets (Taken 6/26/2025 1400)  My COPD Symptoms To Do List:   arrange in-home help services   follow rescue plan if symptoms flare up   use an extra pillow to sleep   keep follow-up appointments  Goal: Track and Manage My Triggers  Outcome: Progressing  Intervention: My COPD Triggers To Do List  Description: Why is this important?Triggers are activities or things, like tobacco smoke or cold weather, that make your COPD (chronic obstructive pulmonary disease) flare up.Knowing these triggers helps you plan how to stay away from them.When you cannot remove  them, you can learn how to manage them.  Flowsheets (Taken 6/26/2025 1400)  My COPD Triggers To Do List: listen for public air quality announcements every day  Goal: Manage My Fatigue (Tiredness)  Outcome: Progressing  Intervention: My Fatigue Management To Do List  Description: Why is this important?Feeling tired or worn out is a common symptom of COPD (chronic obstructive pulmonary disease).Learning when you feel your best and when you need rest is important.Managing the tiredness (fatigue) will help you be active and enjoy life.  Flowsheets (Taken 6/26/2025 1400)  My Fatigue Management To Do List:   keep bedroom cool and dark   limit daytime naps   use a fan or white noise in bedroom  Goal: Learn and Do Breathing Exercises  Outcome: Progressing  Intervention: My Breathing Exercises To Do List  Description: Why is this important?Breathing exercises can help lessen the cough that comes with chronic obstructive pulmonary disease.Doing the exercises will give you more energy.They will also help you to control your symptoms.  Flowsheets (Taken 6/26/2025 1400)  My Breathing Exercises To Do List: do exercises in a comfortable position that makes breathing as easy as possible  Goal: Optimal Care Coordination of a Patient Experiencing COPD  Outcome: Progressing  Intervention: Support Psychosocial Response to COPD  Flowsheets (Taken 6/26/2025 1400)  Support Psychosocial Response to COPD:   caregiver stress acknowledged   decision-making supported   emotional support provided   verbalization of feelings encouraged  Intervention: Alleviate Barriers to COPD Management  Flowsheets (Taken 6/26/2025 1400)  Alleviate Barriers to COPD Management:   breathing techniques encouraged   self-awareness of symptom triggers encouraged  Intervention: Identify and Minimize Risk of COPD Exacerbation  Flowsheets (Taken 6/26/2025 1400)  Identify and Minimize Risk of COPD Exacerbation:   rescue (action) plan reviewed   signs/symptoms of  worsening disease assessed

## 2025-06-26 NOTE — OUTREACH NOTE
AMBULATORY CASE MANAGEMENT NOTE    Names and Relationships of Patient/Support Persons: Contact: Malu Aguilar; Relationship: Emergency Contact -     [facility] Interim Update    [facility] outreach with Daughter/Malu. Reports that Patient is doing well. Stated that they canceled today's hospital follow up due to the heat and waiting on the AC repair team to arrive. Reports their AC went out yesterday and she has it scheduled for repair today due to her mother's COPD. Reviewed S/S oc COPD exac and ways to stay cool until AC is repaired.     Reports that no one from home health has contacted them but she will call [facility] tomorrow to follow up on orders and schedule SOC. She is working with [facility] and local health department for HCBW program. Declined assistance with rescheduling office visit at this time. Denied any additional questions at this time.Next outreach scheduled.    Education Documentation  unresolved/worsening symptoms, taught by Soledad Stauffer, RN at 6/26/2025  2:03 PM.  Learner: Family  Readiness: Acceptance  Method: Explanation, Teach Back  Response: Verbalizes Understanding, Needs Reinforcement    provider follow-up, taught by Soledad Stauffer, RN at 6/26/2025  2:03 PM.  Learner: Family  Readiness: Acceptance  Method: Explanation, Teach Back  Response: Verbalizes Understanding, Needs Reinforcement    coping, taught by Soledad Stauffer, RN at 6/26/2025  2:03 PM.  Learner: Family  Readiness: Acceptance  Method: Explanation, Teach Back  Response: Verbalizes Understanding, Needs Reinforcement    COPD action plan, taught by Soledad Stauffer, RN at 6/26/2025  2:03 PM.  Learner: Family  Readiness: Acceptance  Method: Explanation, Teach Back  Response: Verbalizes Understanding, Needs Reinforcement    environmental exposure, taught by Soledad Stauffer, RN at 6/26/2025  2:03 PM.  Learner: Family  Readiness: Acceptance  Method: Explanation, Teach Back  Response: Verbalizes Understanding, Needs  Reinforcement    signs/symptoms, taught by Soledad Stauffer, RN at 6/26/2025  2:03 PM.  Learner: Family  Readiness: Acceptance  Method: Explanation, Teach Back  Response: Verbalizes Understanding, Needs Reinforcement          Soledad JACOBS  Ambulatory Case Management    6/26/2025, 14:03 EDT

## 2025-06-30 ENCOUNTER — PATIENT OUTREACH (OUTPATIENT)
Dept: CASE MANAGEMENT | Facility: OTHER | Age: 80
End: 2025-06-30
Payer: MEDICARE

## 2025-06-30 DIAGNOSIS — J44.1 COPD EXACERBATION: ICD-10-CM

## 2025-06-30 DIAGNOSIS — J96.21 ACUTE ON CHRONIC RESPIRATORY FAILURE WITH HYPOXIA AND HYPERCAPNIA: Primary | ICD-10-CM

## 2025-06-30 DIAGNOSIS — J96.22 ACUTE ON CHRONIC RESPIRATORY FAILURE WITH HYPOXIA AND HYPERCAPNIA: Primary | ICD-10-CM

## 2025-06-30 RX ORDER — ATORVASTATIN CALCIUM 40 MG/1
40 TABLET, FILM COATED ORAL DAILY
Qty: 90 TABLET | Refills: 2 | Status: SHIPPED | OUTPATIENT
Start: 2025-06-30

## 2025-06-30 NOTE — OUTREACH NOTE
[facility] End of Month Documentation    This Chronic Medical Management Care Plan for Gabi Dudley, 80 y.o. female, has been monitored and managed and a new plan of care implemented for the month of June.  A cumulative time of 43  minutes was spent on this patient record this month, including chart review; electronic communication with other providers; phone call with other providers; phone call with relative, BHR ADM to rehab with [facility]ICK[facility]AR[facility] and home [facility], canceled hosp F/U due to heat.    Regarding the patient's problems: has Adrenal adenoma; Anxiety; Chronic fatigue; Fibromyalgia; Hyperlipidemia; Essential hypertension; Insomnia; Osteoarthritis of knee; Osteoporosis; Pulmonary emphysema; Simple renal cyst; Tension headache; Vitamin D deficiency; Diverticulosis; Inversion of nipple; Paroxysmal atrial fibrillation; Coronary artery disease involving native coronary artery of native heart without angina pectoris; Autonomic dysfunction; Gastroesophageal reflux disease without esophagitis; Urge incontinence; Erythrasma; Compression fracture of T5 vertebra; Lung nodule; COPD (chronic obstructive pulmonary disease); Overweight (BMI 25.0-29.9); Acute on chronic respiratory failure with hypoxia; Acute on chronic respiratory failure with hypoxia and hypercapnia; Iron deficiency anemia; Impaired mobility and ADLs; Acute respiratory failure with hypoxia and hypercapnia; Aspiration pneumonia of right lower lobe; Partial small bowel obstruction; Moderate malnutrition; Bowel obstruction; Enteritis; Diverticulitis; Elevated troponin; Chronic combined systolic and diastolic CHF (congestive heart failure); NSTEMI (non-ST elevated myocardial infarction); COPD exacerbation; Altered mental status; and Altered mental status, unspecified on their problem list., the following items were addressed: medical records; changes to medical care; referrals to community service providers; medications, BHR ADM to  rehab with S[facility] and home with [facility], canceled hosp F/U due to heat and any changes can be found within the plan section of the note.  A detailed listing of time spent for chronic care management is tracked within each outreach encounter.  Current medications include:  has a current medication list which includes the following prescription(s): albuterol sulfate hfa, amiodarone, apixaban, atorvastatin, benzonatate, breztri aerosphere, clonazepam, diclofenac sodium, duloxetine, fluticasone, furosemide, ipratropium-albuterol, loperamide, losartan, meclizine, metoprolol succinate xl, o2, ondansetron odt, oxycodone-acetaminophen, pantoprazole, prochlorperazine, quetiapine, saline, and sertraline. and the patient is reported to be patient is compliant with medication protocol; caregiver will take responsibility for med compliance, Non adherent to BiPAP,  Medications are reported to be non-effective in controlling symptoms and changes have been made to the medication protocol, Hospitalization with short term rehab in the last 30 days.  Regarding these diagnoses, referrals were made to the following provider(s):  None.  All notes on chart for PCP to review.    The patient was monitored remotely for medications; weight, Recommended daily weights; monitoring for fluid/edema, 2-3lb wt gain in 24 hours or 5/week.    The patient's physical needs include:  needs assistance with ADLs; help taking medications as prescribed; DME supplies; medication education, Requesting hospital bed-completed.     The patient's mental support needs include:  not applicable; needs met    The patient's cognitive support needs include:  not applicable; needs met    The patient's psychosocial support needs include:  needs met; not applicable    The patient's functional needs include: DME; needs assistance for ADLs; medication education, Requesting hospital bed, referral placed to [facility]    The patient's  environmental needs include:  not applicable, Resides with daughter, daughter works from home    Care Plan overall comments:  BHR ADM to rehab with [facility] and home with [facility], canceled hosp F/U due to heat    Refer to previous outreach notes for more information on the areas listed above.    Monthly Billing Diagnoses  (J96.21,  J96.22) Acute on chronic respiratory failure with hypoxia and hypercapnia    (J44.1) COPD exacerbation    Medications   Medications have been reconciled    Care Plan progress this month:      Recently Modified Care Plans Updates made since 5/30/2025 12:00 AM      No recently modified care plans.             COPD       Track and Manage My Symptoms (Progressing)       Start:  05/08/25    Expected End:  06/30/27         My COPD Symptoms To Do List       Start:  05/08/25       Why is this important?Tracking your symptoms and other information about your health helps your doctor plan your care.Write down the symptoms, the time of day, what you were doing and what medicine you are taking.You will soon learn how to manage your symptoms.       Initial Last    My COPD Symptoms To Do List: follow rescue plan if symptoms flare up taken at 05/08/25 arrange in-home help services;follow rescue plan if symptoms flare up;use an extra pillow to sleep;keep follow-up appointments taken at 06/26/25            Track and Manage My Triggers (Progressing)       Start:  05/08/25    Expected End:  06/30/27         My COPD Triggers To Do List       Start:  05/08/25       Why is this important?Triggers are activities or things, like tobacco smoke or cold weather, that make your COPD (chronic obstructive pulmonary disease) flare up.Knowing these triggers helps you plan how to stay away from them.When you cannot remove them, you can learn how to manage them.       Initial Last    My COPD Triggers To Do List: listen for public air quality announcements every day taken at 05/08/25 listen  for public air quality announcements every day taken at 06/26/25            Manage My Fatigue (Tiredness) (Progressing)       Start:  05/08/25    Expected End:  06/30/27         My Fatigue Management To Do List       Start:  05/08/25       Why is this important?Feeling tired or worn out is a common symptom of COPD (chronic obstructive pulmonary disease).Learning when you feel your best and when you need rest is important.Managing the tiredness (fatigue) will help you be active and enjoy life.       Initial Last    My Fatigue Management To Do List: keep bedroom cool and dark;develop a new routine to improve sleep;limit daytime naps;eat healthy taken at 05/08/25 keep bedroom cool and dark;limit daytime naps;use a fan or white noise in bedroom taken at 06/26/25            Learn and Do Breathing Exercises (Progressing)       Start:  05/08/25    Expected End:  06/30/27         My Breathing Exercises To Do List       Start:  05/08/25       Why is this important?Breathing exercises can help lessen the cough that comes with chronic obstructive pulmonary disease.Doing the exercises will give you more energy.They will also help you to control your symptoms.       Initial Last    My Breathing Exercises To Do List: do exercises in a comfortable position that makes breathing as easy as possible taken at 05/08/25 do exercises in a comfortable position that makes breathing as easy as possible taken at 06/26/25            Optimal Care Coordination of a Patient Experiencing COPD (Progressing)       Start:  05/08/25    Expected End:  06/30/27         Support Psychosocial Response to COPD       Start:  05/08/25          Initial Last    Support Psychosocial Response to COPD: caregiver support provided;emotional support provided;family involvement promoted;verbalization of feelings encouraged taken at 05/08/25 caregiver stress acknowledged;decision-making supported;emotional support provided;verbalization of feelings encouraged taken at  06/26/25         Alleviate Barriers to COPD Management       Start:  05/08/25          Initial Last    Alleviate Barriers to COPD Management: action plan reviewed;communicable disease prevention promoted;medication-adherence assessment completed;self-awareness of symptom triggers encouraged taken at 05/08/25 breathing techniques encouraged;self-awareness of symptom triggers encouraged taken at 06/26/25         Identify and Minimize Risk of COPD Exacerbation       Start:  05/08/25          Initial Last    Identify and Minimize Risk of COPD Exacerbation: breathing techniques encouraged;participation in pulmonary rehabilitation encouraged;rescue (action) plan reviewed;signs/symptoms of worsening disease assessed;barriers to treatment reviewed and addressed taken at 05/08/25 rescue (action) plan reviewed;signs/symptoms of worsening disease assessed taken at 06/26/25             Current Specialty Plan of Care Status signed by both patient and provider    Instructions   Patient was provided an electronic copy of care plan  CCM services were explained and offered and patient has accepted these services.  Patient has given their written consent to receive CCM services and understands that this includes the authorization of electronic communication of medical information with the other treating providers.  Patient understands that they may stop CCM services at any time and these changes will be effective at the end of the calendar month and will effectively revocate the agreement of CCM services.  Patient understands that only one practitioner can furnish and be paid for CCM services during one calendar month.  Patient also understands that there may be co-payment or deductible fees in association with CCM services.  Patient will continue with at least monthly follow-up calls with the Ambulatory .    Soledad JACOBS  Ambulatory Case Management    6/30/2025, 10:30 EDT

## 2025-07-02 NOTE — CASE MANAGEMENT/SOCIAL WORK
Abdomen soft, non-distended.  Discharge Planning Assessment  [facility]     Patient Name: Gabi Dudley  MRN: [id number]  Today's Date: 5/23/2023    Admit Date: 5/22/2023    Plan: Home with familyt   Discharge Needs Assessment     Row Name 05/23/23 1414       Living Environment    People in Home child(ashely), adult    Current Living Arrangements home    Primary Care Provided by self    Provides Primary Care For no one    Family Caregiver if Needed child(ashely), adult       Resource/Environmental Concerns    Resource/Environmental Concerns none    Transportation Concerns none       Transition Planning    Patient/Family Anticipates Transition to home with family    Transportation Anticipated family or friend will provide       Discharge Needs Assessment    Equipment Currently Used at Home nebulizer;noninvasive ventilator;oxygen;walker, rolling    Concerns to be Addressed discharge planning    Anticipated Changes Related to Illness inability to care for self               Discharge Plan     Row Name 05/23/23 1423       Plan    Plan Home with family    Plan Comments Spoke to pt  regarding discharge plans .Confirmed address and phone number as being correct on face sheet. She lives with her daughter.Has Oxygen 4 LNC Continuously unsure of DME Has a Trilogy and Nebulizer  Uses a walker at times .At this time she is planning onj retuning home When asked about Home Health She wants to discuss this with her daughter When asked if I could speak to her daughter she  was at work and not to call her . Called [facility] She was there for one day signed out AMA .Case Management to follow              Continued Care and Services - Admitted Since 5/22/2023     Destination     Service Provider Request Status Selected Services Address Phone Fax Patient Preferred    [facility] Pending - No Request Sent N/A [address] [address] [phone] --            Selected Continued Care - Episodes Includes  continued care and service providers with selected services from the active episodes listed below    High Risk Care Management Episode start date: 4/3/2023   There are no active outsourced providers for this episode.             Selected Continued Care - Prior Encounters Includes continued care and service providers with selected services from prior encounters from 2/21/2023 to 5/23/2023    Discharged on 5/11/2023 Admission date: 5/5/2023 - Discharge disposition: Skilled Nursing Facility (DC - External)    Destination     Service Provider Selected Services Address Phone Fax Patient Preferred    [facility] [address] [address] [phone] --          Durable Medical Equipment     Service Provider Selected Services Address Phone Fax Patient Preferred    [facility] -- [phone] --       Internal Comment last updated by Malu Holden, APRN 5/5/2023 1518    Supplies pt NIV               [facility] Durable Medical Equipment [address] [place], [place] [address]ATE DR ZAMORA [address] [place] [address] [phone] --       Internal Comment last updated by Malu Holden, APRN 5/5/2023 1519    Supplies pt O2                               Expected Discharge Date and Time     Expected Discharge Date Expected Discharge Time    May 27, 2023          Demographic Summary     Row Name 05/23/23 1413       General Information    Admission Type inpatient    Referral Source admission list               Functional Status     Row Name 05/23/23 1413       Functional Status    Usual Activity Tolerance fair       Mental Status    General Appearance WDL WDL               Psychosocial    No documentation.                Abuse/Neglect    No documentation.                Legal    No documentation.                Substance Abuse    No documentation.                Patient Forms    No documentation.                    Pamela Dubon, RN

## 2025-07-07 DIAGNOSIS — F41.9 ANXIETY: ICD-10-CM

## 2025-07-08 ENCOUNTER — TELEPHONE (OUTPATIENT)
Dept: INTERNAL MEDICINE | Facility: CLINIC | Age: 80
End: 2025-07-08
Payer: MEDICARE

## 2025-07-08 ENCOUNTER — PATIENT OUTREACH (OUTPATIENT)
Age: 80
End: 2025-07-08
Payer: MEDICARE

## 2025-07-08 RX ORDER — SERTRALINE HYDROCHLORIDE 100 MG/1
TABLET, FILM COATED ORAL
Qty: 135 TABLET | Refills: 1 | Status: SHIPPED | OUTPATIENT
Start: 2025-07-08

## 2025-07-08 RX ORDER — DULOXETIN HYDROCHLORIDE 60 MG/1
60 CAPSULE, DELAYED RELEASE ORAL 2 TIMES DAILY
Qty: 180 CAPSULE | Refills: 1 | OUTPATIENT
Start: 2025-07-08

## 2025-07-08 RX ORDER — DULOXETIN HYDROCHLORIDE 60 MG/1
60 CAPSULE, DELAYED RELEASE ORAL 2 TIMES DAILY
Qty: 180 CAPSULE | Refills: 1 | Status: SHIPPED | OUTPATIENT
Start: 2025-07-08

## 2025-07-08 RX ORDER — CLOTRIMAZOLE 1 %
1 CREAM (GRAM) TOPICAL 2 TIMES DAILY
Qty: 113 G | Refills: 3 | Status: SHIPPED | OUTPATIENT
Start: 2025-07-08

## 2025-07-08 NOTE — TELEPHONE ENCOUNTER
Caller: AguilarMalu    Relationship: Emergency Contact    Best call back number: [phone]     Requested Prescriptions:   Requested Prescriptions     Pending Prescriptions Disp Refills    sertraline (ZOLOFT) 100 MG tablet 135 tablet 1     Sig: TAKE 1 & 1/2 TABLETS BY MOUTH DAILY        Pharmacy where request should be sent: [id number] - [place], [place] - [address]  - [phone]  - [phone] FX     Last office visit with prescribing clinician: 5/1/2025   Last telemedicine visit with prescribing clinician: Visit date not found   Next office visit with prescribing clinician: Visit date not found     Additional details provided by patient: PATIENT IS OUT    Does the patient have less than a 3 day supply:  [x] Yes  [] No    Would you like a call back once the refill request has been completed: [] Yes [x] No    If the office needs to give you a call back, can they leave a voicemail: [] Yes [x] No    Rafa Vásquez Rep   07/08/25 08:49 EDT

## 2025-07-08 NOTE — OUTREACH NOTE
SW to D/c pt due to no new needs surfacing in monitoring period.     Hunter LEES -   Ambulatory Case Management    7/8/2025, 13:37 EDT

## 2025-07-08 NOTE — TELEPHONE ENCOUNTER
Caller: Malu Aguilar    Relationship: Emergency Contact    Best call back number: [phone]     What medication are you requesting: CLOTRIMAZOLE ANTI-FUNGAL CREAM    If a prescription is needed, what is your preferred pharmacy and phone number: [facility] - [place] [address] [address]  - [phone]  - [phone]      Additional notes: PATIENT'S DAUGHTER STATES THE PATIENT USES THIS DAILY AND HAS BEEN FOR YEARS. SHE CURRENTLY HAS ENOUGH FOR THIS WEEK

## 2025-07-09 DIAGNOSIS — I48.0 PAROXYSMAL ATRIAL FIBRILLATION: ICD-10-CM

## 2025-07-09 DIAGNOSIS — I10 ESSENTIAL HYPERTENSION: Primary | ICD-10-CM

## 2025-07-09 RX ORDER — LOSARTAN POTASSIUM 25 MG/1
25 TABLET ORAL DAILY
Qty: 90 TABLET | Refills: 3 | Status: SHIPPED | OUTPATIENT
Start: 2025-07-09

## 2025-07-24 ENCOUNTER — TELEPHONE (OUTPATIENT)
Dept: CASE MANAGEMENT | Facility: OTHER | Age: 80
End: 2025-07-24
Payer: MEDICARE

## 2025-07-24 DIAGNOSIS — J96.22 ACUTE ON CHRONIC RESPIRATORY FAILURE WITH HYPOXIA AND HYPERCAPNIA: Primary | ICD-10-CM

## 2025-07-24 DIAGNOSIS — J44.1 COPD EXACERBATION: ICD-10-CM

## 2025-07-24 DIAGNOSIS — J96.21 ACUTE ON CHRONIC RESPIRATORY FAILURE WITH HYPOXIA AND HYPERCAPNIA: Primary | ICD-10-CM

## 2025-07-24 NOTE — TELEPHONE ENCOUNTER
[facility] CM outreach with Patient; left a voicemail requesting a return call to EFRAÍN Rowe with Chronic Care Management at [phone].

## 2025-08-04 ENCOUNTER — APPOINTMENT (OUTPATIENT)
Dept: CT IMAGING | Facility: HOSPITAL | Age: 80
End: 2025-08-04
Payer: MEDICARE

## 2025-08-04 ENCOUNTER — APPOINTMENT (OUTPATIENT)
Dept: GENERAL RADIOLOGY | Facility: HOSPITAL | Age: 80
End: 2025-08-04
Payer: MEDICARE

## 2025-08-04 ENCOUNTER — HOSPITAL ENCOUNTER (EMERGENCY)
Facility: HOSPITAL | Age: 80
Discharge: HOME | End: 2025-08-04
Attending: STUDENT IN AN ORGANIZED HEALTH CARE EDUCATION/TRAINING PROGRAM | Admitting: STUDENT IN AN ORGANIZED HEALTH CARE EDUCATION/TRAINING PROGRAM
Payer: MEDICARE

## 2025-08-04 VITALS
TEMPERATURE: 98.4 F | OXYGEN SATURATION: 98 % | DIASTOLIC BLOOD PRESSURE: 159 MMHG | HEIGHT: 65 IN | WEIGHT: 131 LBS | BODY MASS INDEX: 21.83 KG/M2 | HEART RATE: 59 BPM | RESPIRATION RATE: 18 BRPM | SYSTOLIC BLOOD PRESSURE: 169 MMHG

## 2025-08-04 DIAGNOSIS — J44.1 COPD EXACERBATION: Primary | ICD-10-CM

## 2025-08-04 LAB
A-A DO2: ABNORMAL
ALBUMIN SERPL-MCNC: 3.4 G/DL (ref 3.5–5.2)
ALBUMIN/GLOB SERPL: 1.2 G/DL
ALP SERPL-CCNC: 84 U/L (ref 39–117)
ALT SERPL W P-5'-P-CCNC: 23 U/L (ref 1–33)
ANION GAP SERPL CALCULATED.3IONS-SCNC: 7.1 MMOL/L (ref 5–15)
ARTERIAL PATENCY WRIST A: POSITIVE
AST SERPL-CCNC: 45 U/L (ref 1–32)
ATMOSPHERIC PRESS: 735 MMHG
BACTERIA UR QL AUTO: ABNORMAL /HPF
BASE EXCESS BLDA CALC-SCNC: 15.8 MMOL/L (ref 0–2)
BASOPHILS # BLD AUTO: 0.04 10*3/MM3 (ref 0–0.2)
BASOPHILS NFR BLD AUTO: 0.5 % (ref 0–1.5)
BDY SITE: ABNORMAL
BILIRUB SERPL-MCNC: 0.2 MG/DL (ref 0–1.2)
BILIRUB UR QL STRIP: NEGATIVE
BUN SERPL-MCNC: 18 MG/DL (ref 8–23)
BUN/CREAT SERPL: 40.9 (ref 7–25)
CALCIUM SPEC-SCNC: 8.8 MG/DL (ref 8.6–10.5)
CHLORIDE SERPL-SCNC: 97 MMOL/L (ref 98–107)
CLARITY UR: CLEAR
CO2 SERPL-SCNC: 39.9 MMOL/L (ref 22–29)
COHGB MFR BLD: 1.5 % (ref 0–2)
COLOR UR: YELLOW
CREAT SERPL-MCNC: 0.44 MG/DL (ref 0.57–1)
CRP SERPL-MCNC: 2.42 MG/DL (ref 0–0.5)
D-LACTATE SERPL-SCNC: 0.8 MMOL/L (ref 0.5–2)
DEPRECATED RDW RBC AUTO: 49.1 FL (ref 37–54)
EGFRCR SERPLBLD CKD-EPI 2021: 99.1 ML/MIN/1.73
EOSINOPHIL # BLD AUTO: 0.07 10*3/MM3 (ref 0–0.4)
EOSINOPHIL NFR BLD AUTO: 0.9 % (ref 0.3–6.2)
ERYTHROCYTE [DISTWIDTH] IN BLOOD BY AUTOMATED COUNT: 14.6 % (ref 12.3–15.4)
FLUAV SUBTYP SPEC NAA+PROBE: NOT DETECTED
FLUBV RNA NPH QL NAA+NON-PROBE: NOT DETECTED
GAS FLOW AIRWAY: 3 LPM
GEN 5 1HR TROPONIN T REFLEX: 13 NG/L
GLOBULIN UR ELPH-MCNC: 2.8 GM/DL
GLUCOSE SERPL-MCNC: 101 MG/DL (ref 65–99)
GLUCOSE UR STRIP-MCNC: NEGATIVE MG/DL
HCO3 BLDA-SCNC: 44.4 MMOL/L (ref 22–28)
HCT VFR BLD AUTO: 32.6 % (ref 34–46.6)
HCT VFR BLD CALC: 28.5 %
HGB BLD-MCNC: 9.3 G/DL (ref 12–15.9)
HGB UR QL STRIP.AUTO: ABNORMAL
HYALINE CASTS UR QL AUTO: ABNORMAL /LPF
HYPOCHROMIA BLD QL: NORMAL
IMM GRANULOCYTES # BLD AUTO: 0.04 10*3/MM3 (ref 0–0.05)
IMM GRANULOCYTES NFR BLD AUTO: 0.5 % (ref 0–0.5)
KETONES UR QL STRIP: NEGATIVE
LEUKOCYTE ESTERASE UR QL STRIP.AUTO: ABNORMAL
LYMPHOCYTES # BLD AUTO: 1.18 10*3/MM3 (ref 0.7–3.1)
LYMPHOCYTES NFR BLD AUTO: 14.6 % (ref 19.6–45.3)
Lab: ABNORMAL
Lab: ABNORMAL
MAGNESIUM SERPL-MCNC: 1.9 MG/DL (ref 1.6–2.4)
MCH RBC QN AUTO: 25.9 PG (ref 26.6–33)
MCHC RBC AUTO-ENTMCNC: 28.5 G/DL (ref 31.5–35.7)
MCV RBC AUTO: 90.8 FL (ref 79–97)
METHGB BLD QL: 0.6 % (ref 0–1.5)
MODALITY: ABNORMAL
MONOCYTES # BLD AUTO: 0.58 10*3/MM3 (ref 0.1–0.9)
MONOCYTES NFR BLD AUTO: 7.2 % (ref 5–12)
NEUTROPHILS NFR BLD AUTO: 6.17 10*3/MM3 (ref 1.7–7)
NEUTROPHILS NFR BLD AUTO: 76.3 % (ref 42.7–76)
NITRITE UR QL STRIP: NEGATIVE
NOTIFIED BY: ABNORMAL
NOTIFIED WHO: ABNORMAL
NRBC BLD AUTO-RTO: 0 /100 WBC (ref 0–0.2)
NT-PROBNP SERPL-MCNC: 2448 PG/ML (ref 0–1800)
OXYHGB MFR BLDV: 91.3 % (ref 94–99)
PCO2 BLDA: 84.5 MM HG (ref 35–45)
PCO2 TEMP ADJ BLD: ABNORMAL MM[HG]
PH BLDA: 7.33 PH UNITS (ref 7.3–7.5)
PH UR STRIP.AUTO: 6 [PH] (ref 5–8)
PH, TEMP CORRECTED: ABNORMAL
PLAT MORPH BLD: NORMAL
PLATELET # BLD AUTO: 206 10*3/MM3 (ref 140–450)
PMV BLD AUTO: 10.5 FL (ref 6–12)
PO2 BLDA: 65.7 MM HG (ref 75–100)
PO2 TEMP ADJ BLD: ABNORMAL MM[HG]
POTASSIUM SERPL-SCNC: 4.8 MMOL/L (ref 3.5–5.2)
PROCALCITONIN SERPL-MCNC: 0.05 NG/ML (ref 0–0.25)
PROT SERPL-MCNC: 6.2 G/DL (ref 6–8.5)
PROT UR QL STRIP: ABNORMAL
RBC # BLD AUTO: 3.59 10*6/MM3 (ref 3.77–5.28)
RBC # UR STRIP: ABNORMAL /HPF
REF LAB TEST METHOD: ABNORMAL
SAO2 % BLDCOA: 93.2 % (ref 94–100)
SARS-COV-2 RNA RESP QL NAA+PROBE: NOT DETECTED
SODIUM SERPL-SCNC: 144 MMOL/L (ref 136–145)
SP GR UR STRIP: 1.02 (ref 1–1.03)
SQUAMOUS #/AREA URNS HPF: ABNORMAL /HPF
TROPONIN T NUMERIC DELTA: 0 NG/L
TROPONIN T SERPL HS-MCNC: 13 NG/L
UROBILINOGEN UR QL STRIP: ABNORMAL
VENTILATOR MODE: ABNORMAL
WBC # UR STRIP: ABNORMAL /HPF
WBC MORPH BLD: NORMAL
WBC NRBC COR # BLD AUTO: 8.08 10*3/MM3 (ref 3.4–10.8)

## 2025-08-04 PROCEDURE — 84484 ASSAY OF TROPONIN QUANT: CPT | Performed by: STUDENT IN AN ORGANIZED HEALTH CARE EDUCATION/TRAINING PROGRAM

## 2025-08-04 PROCEDURE — 83605 ASSAY OF LACTIC ACID: CPT | Performed by: STUDENT IN AN ORGANIZED HEALTH CARE EDUCATION/TRAINING PROGRAM

## 2025-08-04 PROCEDURE — 83050 HGB METHEMOGLOBIN QUAN: CPT

## 2025-08-04 PROCEDURE — 85007 BL SMEAR W/DIFF WBC COUNT: CPT | Performed by: STUDENT IN AN ORGANIZED HEALTH CARE EDUCATION/TRAINING PROGRAM

## 2025-08-04 PROCEDURE — 93005 ELECTROCARDIOGRAM TRACING: CPT | Performed by: STUDENT IN AN ORGANIZED HEALTH CARE EDUCATION/TRAINING PROGRAM

## 2025-08-04 PROCEDURE — 36600 WITHDRAWAL OF ARTERIAL BLOOD: CPT

## 2025-08-04 PROCEDURE — 25010000002 METHYLPREDNISOLONE PER 125 MG: Performed by: STUDENT IN AN ORGANIZED HEALTH CARE EDUCATION/TRAINING PROGRAM

## 2025-08-04 PROCEDURE — 86140 C-REACTIVE PROTEIN: CPT | Performed by: STUDENT IN AN ORGANIZED HEALTH CARE EDUCATION/TRAINING PROGRAM

## 2025-08-04 PROCEDURE — 96365 THER/PROPH/DIAG IV INF INIT: CPT

## 2025-08-04 PROCEDURE — 94799 UNLISTED PULMONARY SVC/PX: CPT

## 2025-08-04 PROCEDURE — 83735 ASSAY OF MAGNESIUM: CPT | Performed by: STUDENT IN AN ORGANIZED HEALTH CARE EDUCATION/TRAINING PROGRAM

## 2025-08-04 PROCEDURE — 84145 PROCALCITONIN (PCT): CPT | Performed by: STUDENT IN AN ORGANIZED HEALTH CARE EDUCATION/TRAINING PROGRAM

## 2025-08-04 PROCEDURE — 85025 COMPLETE CBC W/AUTO DIFF WBC: CPT | Performed by: STUDENT IN AN ORGANIZED HEALTH CARE EDUCATION/TRAINING PROGRAM

## 2025-08-04 PROCEDURE — 71045 X-RAY EXAM CHEST 1 VIEW: CPT

## 2025-08-04 PROCEDURE — 94760 N-INVAS EAR/PLS OXIMETRY 1: CPT

## 2025-08-04 PROCEDURE — 82805 BLOOD GASES W/O2 SATURATION: CPT

## 2025-08-04 PROCEDURE — 81001 URINALYSIS AUTO W/SCOPE: CPT | Performed by: STUDENT IN AN ORGANIZED HEALTH CARE EDUCATION/TRAINING PROGRAM

## 2025-08-04 PROCEDURE — 70450 CT HEAD/BRAIN W/O DYE: CPT

## 2025-08-04 PROCEDURE — 25010000002 MAGNESIUM SULFATE 2 GM/50ML SOLUTION: Performed by: STUDENT IN AN ORGANIZED HEALTH CARE EDUCATION/TRAINING PROGRAM

## 2025-08-04 PROCEDURE — 80053 COMPREHEN METABOLIC PANEL: CPT | Performed by: STUDENT IN AN ORGANIZED HEALTH CARE EDUCATION/TRAINING PROGRAM

## 2025-08-04 PROCEDURE — 87636 SARSCOV2 & INF A&B AMP PRB: CPT | Performed by: STUDENT IN AN ORGANIZED HEALTH CARE EDUCATION/TRAINING PROGRAM

## 2025-08-04 PROCEDURE — 96375 TX/PRO/DX INJ NEW DRUG ADDON: CPT

## 2025-08-04 PROCEDURE — 82375 ASSAY CARBOXYHB QUANT: CPT

## 2025-08-04 PROCEDURE — 94640 AIRWAY INHALATION TREATMENT: CPT

## 2025-08-04 PROCEDURE — 83880 ASSAY OF NATRIURETIC PEPTIDE: CPT | Performed by: STUDENT IN AN ORGANIZED HEALTH CARE EDUCATION/TRAINING PROGRAM

## 2025-08-04 PROCEDURE — 36415 COLL VENOUS BLD VENIPUNCTURE: CPT

## 2025-08-04 PROCEDURE — 99284 EMERGENCY DEPT VISIT MOD MDM: CPT | Performed by: STUDENT IN AN ORGANIZED HEALTH CARE EDUCATION/TRAINING PROGRAM

## 2025-08-04 RX ORDER — PREDNISONE 20 MG/1
40 TABLET ORAL DAILY
Qty: 10 TABLET | Refills: 0 | Status: SHIPPED | OUTPATIENT
Start: 2025-08-04 | End: 2025-08-09

## 2025-08-04 RX ORDER — METHYLPREDNISOLONE SODIUM SUCCINATE 125 MG/2ML
125 INJECTION, POWDER, LYOPHILIZED, FOR SOLUTION INTRAMUSCULAR; INTRAVENOUS ONCE
Status: COMPLETED | OUTPATIENT
Start: 2025-08-04 | End: 2025-08-04

## 2025-08-04 RX ORDER — IPRATROPIUM BROMIDE AND ALBUTEROL SULFATE 2.5; .5 MG/3ML; MG/3ML
3 SOLUTION RESPIRATORY (INHALATION) ONCE
Status: COMPLETED | OUTPATIENT
Start: 2025-08-04 | End: 2025-08-04

## 2025-08-04 RX ORDER — AZITHROMYCIN 250 MG/1
TABLET, FILM COATED ORAL
Qty: 6 TABLET | Refills: 0 | Status: ON HOLD | OUTPATIENT
Start: 2025-08-04

## 2025-08-04 RX ORDER — MAGNESIUM SULFATE HEPTAHYDRATE 40 MG/ML
2 INJECTION, SOLUTION INTRAVENOUS ONCE
Status: COMPLETED | OUTPATIENT
Start: 2025-08-04 | End: 2025-08-04

## 2025-08-04 RX ADMIN — MAGNESIUM SULFATE HEPTAHYDRATE 2 G: 40 INJECTION, SOLUTION INTRAVENOUS at 15:05

## 2025-08-04 RX ADMIN — METHYLPREDNISOLONE SODIUM SUCCINATE 125 MG: 125 INJECTION, POWDER, FOR SOLUTION INTRAMUSCULAR; INTRAVENOUS at 15:05

## 2025-08-04 RX ADMIN — IPRATROPIUM BROMIDE AND ALBUTEROL SULFATE 3 ML: .5; 3 SOLUTION RESPIRATORY (INHALATION) at 15:40

## 2025-08-05 ENCOUNTER — TELEPHONE (OUTPATIENT)
Dept: CASE MANAGEMENT | Facility: OTHER | Age: 80
End: 2025-08-05
Payer: MEDICARE

## 2025-08-10 ENCOUNTER — APPOINTMENT (OUTPATIENT)
Dept: CT IMAGING | Facility: HOSPITAL | Age: 80
DRG: 190 | End: 2025-08-10
Payer: MEDICARE

## 2025-08-10 ENCOUNTER — APPOINTMENT (OUTPATIENT)
Dept: GENERAL RADIOLOGY | Facility: HOSPITAL | Age: 80
DRG: 190 | End: 2025-08-10
Payer: MEDICARE

## 2025-08-10 ENCOUNTER — HOSPITAL ENCOUNTER (INPATIENT)
Facility: HOSPITAL | Age: 80
LOS: 3 days | Discharge: HOME HEALTH | DRG: 190 | End: 2025-08-14
Attending: STUDENT IN AN ORGANIZED HEALTH CARE EDUCATION/TRAINING PROGRAM | Admitting: INTERNAL MEDICINE
Payer: MEDICARE

## 2025-08-10 DIAGNOSIS — J44.1 COPD EXACERBATION: ICD-10-CM

## 2025-08-10 DIAGNOSIS — J96.02 ACUTE RESPIRATORY FAILURE WITH HYPOXIA AND HYPERCAPNIA: Primary | ICD-10-CM

## 2025-08-10 DIAGNOSIS — J96.01 ACUTE RESPIRATORY FAILURE WITH HYPOXIA AND HYPERCAPNIA: Primary | ICD-10-CM

## 2025-08-10 DIAGNOSIS — J18.9 ATYPICAL PNEUMONIA: ICD-10-CM

## 2025-08-10 LAB
ALBUMIN SERPL-MCNC: 3.5 G/DL (ref 3.5–5.2)
ALBUMIN/GLOB SERPL: 1.3 G/DL
ALP SERPL-CCNC: 85 U/L (ref 39–117)
ALT SERPL W P-5'-P-CCNC: 36 U/L (ref 1–33)
ANION GAP SERPL CALCULATED.3IONS-SCNC: 8.8 MMOL/L (ref 5–15)
AST SERPL-CCNC: 33 U/L (ref 1–32)
ATMOSPHERIC PRESS: 736 MMHG
B PARAPERT DNA SPEC QL NAA+PROBE: NOT DETECTED
B PERT DNA SPEC QL NAA+PROBE: NOT DETECTED
BASE EXCESS BLDV CALC-SCNC: 13.8 MMOL/L (ref 0–2)
BASOPHILS # BLD AUTO: 0.02 10*3/MM3 (ref 0–0.2)
BASOPHILS NFR BLD AUTO: 0.2 % (ref 0–1.5)
BDY SITE: ABNORMAL
BILIRUB SERPL-MCNC: 0.4 MG/DL (ref 0–1.2)
BUN SERPL-MCNC: 23 MG/DL (ref 8–23)
BUN/CREAT SERPL: 43.4 (ref 7–25)
C PNEUM DNA NPH QL NAA+NON-PROBE: NOT DETECTED
CALCIUM SPEC-SCNC: 8.3 MG/DL (ref 8.6–10.5)
CHLORIDE SERPL-SCNC: 95 MMOL/L (ref 98–107)
CO2 SERPL-SCNC: 36.2 MMOL/L (ref 22–29)
COHGB MFR BLD: 1.2 % (ref 0–5)
CREAT SERPL-MCNC: 0.53 MG/DL (ref 0.57–1)
DEPRECATED RDW RBC AUTO: 48.5 FL (ref 37–54)
EGFRCR SERPLBLD CKD-EPI 2021: 94.8 ML/MIN/1.73
EOSINOPHIL # BLD AUTO: 0.05 10*3/MM3 (ref 0–0.4)
EOSINOPHIL NFR BLD AUTO: 0.5 % (ref 0.3–6.2)
ERYTHROCYTE [DISTWIDTH] IN BLOOD BY AUTOMATED COUNT: 15.1 % (ref 12.3–15.4)
FLUAV SUBTYP SPEC NAA+PROBE: NOT DETECTED
FLUBV RNA NPH QL NAA+NON-PROBE: NOT DETECTED
GAS FLOW AIRWAY: 4 LPM
GEN 5 1HR TROPONIN T REFLEX: 24 NG/L
GLOBULIN UR ELPH-MCNC: 2.6 GM/DL
GLUCOSE SERPL-MCNC: 120 MG/DL (ref 65–99)
HADV DNA SPEC NAA+PROBE: NOT DETECTED
HCO3 BLDV-SCNC: 42.6 MMOL/L (ref 22–28)
HCOV 229E RNA SPEC QL NAA+PROBE: NOT DETECTED
HCOV HKU1 RNA SPEC QL NAA+PROBE: NOT DETECTED
HCOV NL63 RNA SPEC QL NAA+PROBE: NOT DETECTED
HCOV OC43 RNA SPEC QL NAA+PROBE: NOT DETECTED
HCT VFR BLD AUTO: 32.1 % (ref 34–46.6)
HGB BLD-MCNC: 9.1 G/DL (ref 12–15.9)
HMPV RNA NPH QL NAA+NON-PROBE: NOT DETECTED
HPIV1 RNA ISLT QL NAA+PROBE: NOT DETECTED
HPIV2 RNA SPEC QL NAA+PROBE: NOT DETECTED
HPIV3 RNA NPH QL NAA+PROBE: NOT DETECTED
HPIV4 P GENE NPH QL NAA+PROBE: NOT DETECTED
HYPOCHROMIA BLD QL: NORMAL
IMM GRANULOCYTES # BLD AUTO: 0.07 10*3/MM3 (ref 0–0.05)
IMM GRANULOCYTES NFR BLD AUTO: 0.7 % (ref 0–0.5)
LYMPHOCYTES # BLD AUTO: 1.09 10*3/MM3 (ref 0.7–3.1)
LYMPHOCYTES NFR BLD AUTO: 10.6 % (ref 19.6–45.3)
Lab: ABNORMAL
Lab: ABNORMAL
M PNEUMO IGG SER IA-ACNC: NOT DETECTED
MCH RBC QN AUTO: 25.1 PG (ref 26.6–33)
MCHC RBC AUTO-ENTMCNC: 28.3 G/DL (ref 31.5–35.7)
MCV RBC AUTO: 88.7 FL (ref 79–97)
METHGB BLD QL: 0.5 % (ref 0–3)
MODALITY: ABNORMAL
MONOCYTES # BLD AUTO: 0.56 10*3/MM3 (ref 0.1–0.9)
MONOCYTES NFR BLD AUTO: 5.4 % (ref 5–12)
NEUTROPHILS NFR BLD AUTO: 8.53 10*3/MM3 (ref 1.7–7)
NEUTROPHILS NFR BLD AUTO: 82.6 % (ref 42.7–76)
NOTIFIED BY: ABNORMAL
NOTIFIED WHO: ABNORMAL
NRBC BLD AUTO-RTO: 0 /100 WBC (ref 0–0.2)
NT-PROBNP SERPL-MCNC: 5502 PG/ML (ref 0–1800)
OXYHGB MFR BLDV: 32.9 % (ref 40–70)
PCO2 BLDV: 84.9 MM HG (ref 40–50)
PH BLDV: 7.31 PH UNITS (ref 7.32–7.42)
PLATELET # BLD AUTO: 155 10*3/MM3 (ref 140–450)
PMV BLD AUTO: 10.5 FL (ref 6–12)
PO2 BLDV: 22.8 MM HG (ref 30–50)
POTASSIUM SERPL-SCNC: 4.3 MMOL/L (ref 3.5–5.2)
PROT SERPL-MCNC: 6.1 G/DL (ref 6–8.5)
RBC # BLD AUTO: 3.62 10*6/MM3 (ref 3.77–5.28)
RHINOVIRUS RNA SPEC NAA+PROBE: NOT DETECTED
RSV RNA NPH QL NAA+NON-PROBE: NOT DETECTED
SAO2 % BLDCOV: 33.5 % (ref 45–75)
SARS-COV-2 RNA RESP QL NAA+PROBE: NOT DETECTED
SMALL PLATELETS BLD QL SMEAR: ADEQUATE
SODIUM SERPL-SCNC: 140 MMOL/L (ref 136–145)
TROPONIN T % DELTA: 9
TROPONIN T NUMERIC DELTA: 2 NG/L
TROPONIN T SERPL HS-MCNC: 22 NG/L
VENTILATOR MODE: ABNORMAL
WBC MORPH BLD: NORMAL
WBC NRBC COR # BLD AUTO: 10.32 10*3/MM3 (ref 3.4–10.8)

## 2025-08-10 PROCEDURE — 94640 AIRWAY INHALATION TREATMENT: CPT

## 2025-08-10 PROCEDURE — 0202U NFCT DS 22 TRGT SARS-COV-2: CPT | Performed by: STUDENT IN AN ORGANIZED HEALTH CARE EDUCATION/TRAINING PROGRAM

## 2025-08-10 PROCEDURE — 82820 HEMOGLOBIN-OXYGEN AFFINITY: CPT

## 2025-08-10 PROCEDURE — 25510000001 IOPAMIDOL 61 % SOLUTION: Performed by: STUDENT IN AN ORGANIZED HEALTH CARE EDUCATION/TRAINING PROGRAM

## 2025-08-10 PROCEDURE — 71275 CT ANGIOGRAPHY CHEST: CPT

## 2025-08-10 PROCEDURE — 99291 CRITICAL CARE FIRST HOUR: CPT | Performed by: STUDENT IN AN ORGANIZED HEALTH CARE EDUCATION/TRAINING PROGRAM

## 2025-08-10 PROCEDURE — 36415 COLL VENOUS BLD VENIPUNCTURE: CPT

## 2025-08-10 PROCEDURE — 80053 COMPREHEN METABOLIC PANEL: CPT | Performed by: STUDENT IN AN ORGANIZED HEALTH CARE EDUCATION/TRAINING PROGRAM

## 2025-08-10 PROCEDURE — 85007 BL SMEAR W/DIFF WBC COUNT: CPT | Performed by: STUDENT IN AN ORGANIZED HEALTH CARE EDUCATION/TRAINING PROGRAM

## 2025-08-10 PROCEDURE — 25010000002 DEXAMETHASONE SODIUM PHOSPHATE 10 MG/ML SOLUTION: Performed by: STUDENT IN AN ORGANIZED HEALTH CARE EDUCATION/TRAINING PROGRAM

## 2025-08-10 PROCEDURE — 82805 BLOOD GASES W/O2 SATURATION: CPT

## 2025-08-10 PROCEDURE — 93005 ELECTROCARDIOGRAM TRACING: CPT | Performed by: STUDENT IN AN ORGANIZED HEALTH CARE EDUCATION/TRAINING PROGRAM

## 2025-08-10 PROCEDURE — 85025 COMPLETE CBC W/AUTO DIFF WBC: CPT | Performed by: STUDENT IN AN ORGANIZED HEALTH CARE EDUCATION/TRAINING PROGRAM

## 2025-08-10 PROCEDURE — 99223 1ST HOSP IP/OBS HIGH 75: CPT | Performed by: INTERNAL MEDICINE

## 2025-08-10 PROCEDURE — 71045 X-RAY EXAM CHEST 1 VIEW: CPT

## 2025-08-10 PROCEDURE — 84484 ASSAY OF TROPONIN QUANT: CPT | Performed by: STUDENT IN AN ORGANIZED HEALTH CARE EDUCATION/TRAINING PROGRAM

## 2025-08-10 PROCEDURE — 83880 ASSAY OF NATRIURETIC PEPTIDE: CPT | Performed by: STUDENT IN AN ORGANIZED HEALTH CARE EDUCATION/TRAINING PROGRAM

## 2025-08-10 RX ORDER — SODIUM CHLORIDE 0.9 % (FLUSH) 0.9 %
10 SYRINGE (ML) INJECTION AS NEEDED
Status: DISCONTINUED | OUTPATIENT
Start: 2025-08-10 | End: 2025-08-14 | Stop reason: HOSPADM

## 2025-08-10 RX ORDER — IOPAMIDOL 612 MG/ML
100 INJECTION, SOLUTION INTRAVASCULAR
Status: COMPLETED | OUTPATIENT
Start: 2025-08-10 | End: 2025-08-10

## 2025-08-10 RX ORDER — IPRATROPIUM BROMIDE AND ALBUTEROL SULFATE 2.5; .5 MG/3ML; MG/3ML
3 SOLUTION RESPIRATORY (INHALATION) ONCE
Status: COMPLETED | OUTPATIENT
Start: 2025-08-10 | End: 2025-08-10

## 2025-08-10 RX ORDER — DEXAMETHASONE SODIUM PHOSPHATE 10 MG/ML
10 INJECTION, SOLUTION INTRAMUSCULAR; INTRAVENOUS ONCE
Status: COMPLETED | OUTPATIENT
Start: 2025-08-10 | End: 2025-08-10

## 2025-08-10 RX ADMIN — DEXAMETHASONE SODIUM PHOSPHATE 10 MG: 10 INJECTION, SOLUTION INTRAMUSCULAR; INTRAVENOUS at 22:04

## 2025-08-10 RX ADMIN — IPRATROPIUM BROMIDE AND ALBUTEROL SULFATE 3 ML: .5; 3 SOLUTION RESPIRATORY (INHALATION) at 21:42

## 2025-08-10 RX ADMIN — IOPAMIDOL 80 ML: 612 INJECTION, SOLUTION INTRAVENOUS at 22:22

## 2025-08-11 PROBLEM — J96.90 RESPIRATORY FAILURE: Status: ACTIVE | Noted: 2025-08-11

## 2025-08-11 LAB
ALBUMIN SERPL-MCNC: 3.6 G/DL (ref 3.5–5.2)
ALBUMIN/GLOB SERPL: 1.4 G/DL
ALP SERPL-CCNC: 76 U/L (ref 39–117)
ALT SERPL W P-5'-P-CCNC: 33 U/L (ref 1–33)
ANION GAP SERPL CALCULATED.3IONS-SCNC: 9.9 MMOL/L (ref 5–15)
AST SERPL-CCNC: 28 U/L (ref 1–32)
BASOPHILS # BLD AUTO: 0.01 10*3/MM3 (ref 0–0.2)
BASOPHILS NFR BLD AUTO: 0.1 % (ref 0–1.5)
BILIRUB SERPL-MCNC: 0.4 MG/DL (ref 0–1.2)
BUN SERPL-MCNC: 19 MG/DL (ref 8–23)
BUN/CREAT SERPL: 36.5 (ref 7–25)
CALCIUM SPEC-SCNC: 9.1 MG/DL (ref 8.6–10.5)
CHLORIDE SERPL-SCNC: 96 MMOL/L (ref 98–107)
CO2 SERPL-SCNC: 36.1 MMOL/L (ref 22–29)
CREAT SERPL-MCNC: 0.52 MG/DL (ref 0.57–1)
DEPRECATED RDW RBC AUTO: 48.2 FL (ref 37–54)
EGFRCR SERPLBLD CKD-EPI 2021: 95.2 ML/MIN/1.73
EOSINOPHIL # BLD AUTO: 0 10*3/MM3 (ref 0–0.4)
EOSINOPHIL NFR BLD AUTO: 0 % (ref 0.3–6.2)
ERYTHROCYTE [DISTWIDTH] IN BLOOD BY AUTOMATED COUNT: 15 % (ref 12.3–15.4)
GLOBULIN UR ELPH-MCNC: 2.6 GM/DL
GLUCOSE SERPL-MCNC: 152 MG/DL (ref 65–99)
HCT VFR BLD AUTO: 32 % (ref 34–46.6)
HGB BLD-MCNC: 9.3 G/DL (ref 12–15.9)
IMM GRANULOCYTES # BLD AUTO: 0.07 10*3/MM3 (ref 0–0.05)
IMM GRANULOCYTES NFR BLD AUTO: 0.7 % (ref 0–0.5)
LYMPHOCYTES # BLD AUTO: 0.48 10*3/MM3 (ref 0.7–3.1)
LYMPHOCYTES NFR BLD AUTO: 4.8 % (ref 19.6–45.3)
MCH RBC QN AUTO: 25.6 PG (ref 26.6–33)
MCHC RBC AUTO-ENTMCNC: 29.1 G/DL (ref 31.5–35.7)
MCV RBC AUTO: 88.2 FL (ref 79–97)
MONOCYTES # BLD AUTO: 0.06 10*3/MM3 (ref 0.1–0.9)
MONOCYTES NFR BLD AUTO: 0.6 % (ref 5–12)
NEUTROPHILS NFR BLD AUTO: 9.4 10*3/MM3 (ref 1.7–7)
NEUTROPHILS NFR BLD AUTO: 93.8 % (ref 42.7–76)
NRBC BLD AUTO-RTO: 0 /100 WBC (ref 0–0.2)
PLATELET # BLD AUTO: 167 10*3/MM3 (ref 140–450)
PMV BLD AUTO: 11.1 FL (ref 6–12)
POTASSIUM SERPL-SCNC: 5.3 MMOL/L (ref 3.5–5.2)
PROT SERPL-MCNC: 6.2 G/DL (ref 6–8.5)
RBC # BLD AUTO: 3.63 10*6/MM3 (ref 3.77–5.28)
SODIUM SERPL-SCNC: 142 MMOL/L (ref 136–145)
WBC NRBC COR # BLD AUTO: 10.02 10*3/MM3 (ref 3.4–10.8)

## 2025-08-11 PROCEDURE — 99232 SBSQ HOSP IP/OBS MODERATE 35: CPT | Performed by: INTERNAL MEDICINE

## 2025-08-11 PROCEDURE — 25010000002 METHYLPREDNISOLONE PER 40 MG: Performed by: INTERNAL MEDICINE

## 2025-08-11 PROCEDURE — 87040 BLOOD CULTURE FOR BACTERIA: CPT | Performed by: INTERNAL MEDICINE

## 2025-08-11 PROCEDURE — 94799 UNLISTED PULMONARY SVC/PX: CPT

## 2025-08-11 PROCEDURE — 85025 COMPLETE CBC W/AUTO DIFF WBC: CPT | Performed by: INTERNAL MEDICINE

## 2025-08-11 PROCEDURE — 94660 CPAP INITIATION&MGMT: CPT

## 2025-08-11 PROCEDURE — 80053 COMPREHEN METABOLIC PANEL: CPT | Performed by: INTERNAL MEDICINE

## 2025-08-11 PROCEDURE — 25010000002 CEFTRIAXONE PER 250 MG: Performed by: INTERNAL MEDICINE

## 2025-08-11 PROCEDURE — 94761 N-INVAS EAR/PLS OXIMETRY MLT: CPT

## 2025-08-11 PROCEDURE — 94760 N-INVAS EAR/PLS OXIMETRY 1: CPT

## 2025-08-11 RX ORDER — SODIUM CHLORIDE 0.9 % (FLUSH) 0.9 %
10 SYRINGE (ML) INJECTION AS NEEDED
Status: DISCONTINUED | OUTPATIENT
Start: 2025-08-10 | End: 2025-08-14 | Stop reason: HOSPADM

## 2025-08-11 RX ORDER — BISACODYL 5 MG/1
5 TABLET, DELAYED RELEASE ORAL DAILY PRN
Status: DISCONTINUED | OUTPATIENT
Start: 2025-08-11 | End: 2025-08-14 | Stop reason: HOSPADM

## 2025-08-11 RX ORDER — BENZONATATE 100 MG/1
100 CAPSULE ORAL 3 TIMES DAILY PRN
Status: DISCONTINUED | OUTPATIENT
Start: 2025-08-10 | End: 2025-08-14 | Stop reason: HOSPADM

## 2025-08-11 RX ORDER — DULOXETIN HYDROCHLORIDE 30 MG/1
60 CAPSULE, DELAYED RELEASE ORAL 2 TIMES DAILY
Status: DISCONTINUED | OUTPATIENT
Start: 2025-08-11 | End: 2025-08-14 | Stop reason: HOSPADM

## 2025-08-11 RX ORDER — CLONAZEPAM 0.5 MG/1
0.5 TABLET ORAL 2 TIMES DAILY PRN
Status: DISCONTINUED | OUTPATIENT
Start: 2025-08-10 | End: 2025-08-14 | Stop reason: HOSPADM

## 2025-08-11 RX ORDER — BISACODYL 10 MG
10 SUPPOSITORY, RECTAL RECTAL DAILY PRN
Status: DISCONTINUED | OUTPATIENT
Start: 2025-08-11 | End: 2025-08-14 | Stop reason: HOSPADM

## 2025-08-11 RX ORDER — PANTOPRAZOLE SODIUM 40 MG/1
40 TABLET, DELAYED RELEASE ORAL DAILY
Status: DISCONTINUED | OUTPATIENT
Start: 2025-08-11 | End: 2025-08-14 | Stop reason: HOSPADM

## 2025-08-11 RX ORDER — ACETAMINOPHEN 650 MG/1
650 SUPPOSITORY RECTAL EVERY 4 HOURS PRN
Status: DISCONTINUED | OUTPATIENT
Start: 2025-08-11 | End: 2025-08-14 | Stop reason: HOSPADM

## 2025-08-11 RX ORDER — ALBUTEROL SULFATE 0.83 MG/ML
2.5 SOLUTION RESPIRATORY (INHALATION) EVERY 6 HOURS PRN
Status: DISCONTINUED | OUTPATIENT
Start: 2025-08-10 | End: 2025-08-14 | Stop reason: HOSPADM

## 2025-08-11 RX ORDER — METOPROLOL SUCCINATE 50 MG/1
50 TABLET, EXTENDED RELEASE ORAL DAILY
Status: DISCONTINUED | OUTPATIENT
Start: 2025-08-11 | End: 2025-08-14 | Stop reason: HOSPADM

## 2025-08-11 RX ORDER — AMIODARONE HYDROCHLORIDE 200 MG/1
200 TABLET ORAL
Status: DISCONTINUED | OUTPATIENT
Start: 2025-08-11 | End: 2025-08-14 | Stop reason: HOSPADM

## 2025-08-11 RX ORDER — OXYCODONE AND ACETAMINOPHEN 7.5; 325 MG/1; MG/1
1 TABLET ORAL ONCE AS NEEDED
Status: COMPLETED | OUTPATIENT
Start: 2025-08-11 | End: 2025-08-11

## 2025-08-11 RX ORDER — LOSARTAN POTASSIUM 25 MG/1
25 TABLET ORAL DAILY
Status: DISCONTINUED | OUTPATIENT
Start: 2025-08-11 | End: 2025-08-13

## 2025-08-11 RX ORDER — POLYETHYLENE GLYCOL 3350 17 G/17G
17 POWDER, FOR SOLUTION ORAL DAILY PRN
Status: DISCONTINUED | OUTPATIENT
Start: 2025-08-11 | End: 2025-08-14 | Stop reason: HOSPADM

## 2025-08-11 RX ORDER — AMOXICILLIN 250 MG
2 CAPSULE ORAL 2 TIMES DAILY PRN
Status: DISCONTINUED | OUTPATIENT
Start: 2025-08-11 | End: 2025-08-14 | Stop reason: HOSPADM

## 2025-08-11 RX ORDER — ACETAMINOPHEN 160 MG/5ML
650 SOLUTION ORAL EVERY 4 HOURS PRN
Status: DISCONTINUED | OUTPATIENT
Start: 2025-08-11 | End: 2025-08-14 | Stop reason: HOSPADM

## 2025-08-11 RX ORDER — SODIUM CHLORIDE 9 MG/ML
40 INJECTION, SOLUTION INTRAVENOUS AS NEEDED
Status: DISCONTINUED | OUTPATIENT
Start: 2025-08-10 | End: 2025-08-14 | Stop reason: HOSPADM

## 2025-08-11 RX ORDER — ONDANSETRON 2 MG/ML
4 INJECTION INTRAMUSCULAR; INTRAVENOUS EVERY 6 HOURS PRN
Status: DISCONTINUED | OUTPATIENT
Start: 2025-08-11 | End: 2025-08-14 | Stop reason: HOSPADM

## 2025-08-11 RX ORDER — ATORVASTATIN CALCIUM 40 MG/1
40 TABLET, FILM COATED ORAL DAILY
Status: DISCONTINUED | OUTPATIENT
Start: 2025-08-11 | End: 2025-08-14 | Stop reason: HOSPADM

## 2025-08-11 RX ORDER — IPRATROPIUM BROMIDE AND ALBUTEROL SULFATE 2.5; .5 MG/3ML; MG/3ML
3 SOLUTION RESPIRATORY (INHALATION)
Status: DISCONTINUED | OUTPATIENT
Start: 2025-08-11 | End: 2025-08-11

## 2025-08-11 RX ORDER — SODIUM CHLORIDE 0.9 % (FLUSH) 0.9 %
10 SYRINGE (ML) INJECTION EVERY 12 HOURS SCHEDULED
Status: DISCONTINUED | OUTPATIENT
Start: 2025-08-11 | End: 2025-08-14 | Stop reason: HOSPADM

## 2025-08-11 RX ORDER — ACETAMINOPHEN 325 MG/1
650 TABLET ORAL EVERY 4 HOURS PRN
Status: DISCONTINUED | OUTPATIENT
Start: 2025-08-11 | End: 2025-08-14 | Stop reason: HOSPADM

## 2025-08-11 RX ORDER — AZITHROMYCIN 250 MG/1
250 TABLET, FILM COATED ORAL
Status: DISCONTINUED | OUTPATIENT
Start: 2025-08-11 | End: 2025-08-12

## 2025-08-11 RX ORDER — NITROGLYCERIN 0.4 MG/1
0.4 TABLET SUBLINGUAL
Status: DISCONTINUED | OUTPATIENT
Start: 2025-08-10 | End: 2025-08-14 | Stop reason: HOSPADM

## 2025-08-11 RX ORDER — METHYLPREDNISOLONE SODIUM SUCCINATE 40 MG/ML
40 INJECTION, POWDER, LYOPHILIZED, FOR SOLUTION INTRAMUSCULAR; INTRAVENOUS EVERY 12 HOURS
Status: DISCONTINUED | OUTPATIENT
Start: 2025-08-11 | End: 2025-08-12

## 2025-08-11 RX ORDER — IPRATROPIUM BROMIDE AND ALBUTEROL SULFATE 2.5; .5 MG/3ML; MG/3ML
3 SOLUTION RESPIRATORY (INHALATION)
Status: DISCONTINUED | OUTPATIENT
Start: 2025-08-11 | End: 2025-08-14 | Stop reason: HOSPADM

## 2025-08-11 RX ADMIN — BENZONATATE 100 MG: 100 CAPSULE ORAL at 20:54

## 2025-08-11 RX ADMIN — Medication 10 ML: at 20:56

## 2025-08-11 RX ADMIN — DULOXETINE 60 MG: 30 CAPSULE, DELAYED RELEASE ORAL at 20:54

## 2025-08-11 RX ADMIN — LOSARTAN POTASSIUM 25 MG: 25 TABLET, FILM COATED ORAL at 08:14

## 2025-08-11 RX ADMIN — APIXABAN 5 MG: 5 TABLET, FILM COATED ORAL at 08:14

## 2025-08-11 RX ADMIN — AMIODARONE HYDROCHLORIDE 200 MG: 200 TABLET ORAL at 08:14

## 2025-08-11 RX ADMIN — ACETAMINOPHEN 650 MG: 325 TABLET ORAL at 10:10

## 2025-08-11 RX ADMIN — METHYLPREDNISOLONE SODIUM SUCCINATE 40 MG: 40 INJECTION, POWDER, FOR SOLUTION INTRAMUSCULAR; INTRAVENOUS at 20:53

## 2025-08-11 RX ADMIN — Medication 10 ML: at 01:49

## 2025-08-11 RX ADMIN — IPRATROPIUM BROMIDE AND ALBUTEROL SULFATE 3 ML: 2.5; .5 SOLUTION RESPIRATORY (INHALATION) at 14:12

## 2025-08-11 RX ADMIN — CLONAZEPAM 0.5 MG: 0.5 TABLET ORAL at 17:07

## 2025-08-11 RX ADMIN — PANTOPRAZOLE SODIUM 40 MG: 40 TABLET, DELAYED RELEASE ORAL at 08:14

## 2025-08-11 RX ADMIN — CLONAZEPAM 0.5 MG: 0.5 TABLET ORAL at 02:23

## 2025-08-11 RX ADMIN — IPRATROPIUM BROMIDE AND ALBUTEROL SULFATE 3 ML: 2.5; .5 SOLUTION RESPIRATORY (INHALATION) at 20:08

## 2025-08-11 RX ADMIN — APIXABAN 5 MG: 5 TABLET, FILM COATED ORAL at 20:54

## 2025-08-11 RX ADMIN — Medication 10 ML: at 08:14

## 2025-08-11 RX ADMIN — CEFTRIAXONE SODIUM 1000 MG: 1 INJECTION, POWDER, FOR SOLUTION INTRAMUSCULAR; INTRAVENOUS at 23:10

## 2025-08-11 RX ADMIN — ATORVASTATIN CALCIUM 40 MG: 40 TABLET, FILM COATED ORAL at 08:14

## 2025-08-11 RX ADMIN — AZITHROMYCIN DIHYDRATE 250 MG: 250 TABLET ORAL at 08:14

## 2025-08-11 RX ADMIN — METOPROLOL SUCCINATE 50 MG: 25 TABLET, EXTENDED RELEASE ORAL at 08:13

## 2025-08-11 RX ADMIN — METHYLPREDNISOLONE SODIUM SUCCINATE 40 MG: 40 INJECTION, POWDER, FOR SOLUTION INTRAMUSCULAR; INTRAVENOUS at 08:14

## 2025-08-11 RX ADMIN — OXYCODONE HYDROCHLORIDE AND ACETAMINOPHEN 1 TABLET: 7.5; 325 TABLET ORAL at 23:29

## 2025-08-11 RX ADMIN — DULOXETINE 60 MG: 30 CAPSULE, DELAYED RELEASE ORAL at 08:51

## 2025-08-11 RX ADMIN — BISACODYL 5 MG: 5 TABLET, COATED ORAL at 20:54

## 2025-08-11 RX ADMIN — IPRATROPIUM BROMIDE AND ALBUTEROL SULFATE 3 ML: 2.5; .5 SOLUTION RESPIRATORY (INHALATION) at 07:17

## 2025-08-11 RX ADMIN — CEFTRIAXONE SODIUM 1000 MG: 1 INJECTION, POWDER, FOR SOLUTION INTRAMUSCULAR; INTRAVENOUS at 01:48

## 2025-08-12 LAB
A-A DO2: ABNORMAL
ALBUMIN SERPL-MCNC: 3.5 G/DL (ref 3.5–5.2)
ALBUMIN/GLOB SERPL: 1.3 G/DL
ALP SERPL-CCNC: 74 U/L (ref 39–117)
ALT SERPL W P-5'-P-CCNC: 33 U/L (ref 1–33)
ANION GAP SERPL CALCULATED.3IONS-SCNC: 6.8 MMOL/L (ref 5–15)
ARTERIAL PATENCY WRIST A: ABNORMAL
AST SERPL-CCNC: 28 U/L (ref 1–32)
ATMOSPHERIC PRESS: 734 MMHG
BASE EXCESS BLDA CALC-SCNC: 11.2 MMOL/L (ref 0–2)
BDY SITE: ABNORMAL
BILIRUB SERPL-MCNC: 0.2 MG/DL (ref 0–1.2)
BUN SERPL-MCNC: 19 MG/DL (ref 8–23)
BUN/CREAT SERPL: 32.8 (ref 7–25)
CALCIUM SPEC-SCNC: 9 MG/DL (ref 8.6–10.5)
CHLORIDE SERPL-SCNC: 101 MMOL/L (ref 98–107)
CO2 SERPL-SCNC: 37.2 MMOL/L (ref 22–29)
COHGB MFR BLD: 1.1 % (ref 0–2)
CREAT SERPL-MCNC: 0.58 MG/DL (ref 0.57–1)
DEPRECATED RDW RBC AUTO: 50.1 FL (ref 37–54)
EGFRCR SERPLBLD CKD-EPI 2021: 92.8 ML/MIN/1.73
ERYTHROCYTE [DISTWIDTH] IN BLOOD BY AUTOMATED COUNT: 15.6 % (ref 12.3–15.4)
GAS FLOW AIRWAY: 4 LPM
GLOBULIN UR ELPH-MCNC: 2.7 GM/DL
GLUCOSE SERPL-MCNC: 99 MG/DL (ref 65–99)
HCO3 BLDA-SCNC: 38.8 MMOL/L (ref 22–28)
HCT VFR BLD AUTO: 33.6 % (ref 34–46.6)
HCT VFR BLD CALC: 29.1 %
HGB BLD-MCNC: 9.6 G/DL (ref 12–15.9)
Lab: ABNORMAL
MCH RBC QN AUTO: 25.2 PG (ref 26.6–33)
MCHC RBC AUTO-ENTMCNC: 28.6 G/DL (ref 31.5–35.7)
MCV RBC AUTO: 88.2 FL (ref 79–97)
METHGB BLD QL: 0.3 % (ref 0–1.5)
MODALITY: ABNORMAL
NOTIFIED BY: ABNORMAL
NOTIFIED WHO: ABNORMAL
OXYHGB MFR BLDV: 97.7 % (ref 94–99)
PCO2 BLDA: 70.9 MM HG (ref 35–45)
PCO2 TEMP ADJ BLD: ABNORMAL MM[HG]
PH BLDA: 7.35 PH UNITS (ref 7.3–7.5)
PH, TEMP CORRECTED: ABNORMAL
PLATELET # BLD AUTO: 221 10*3/MM3 (ref 140–450)
PMV BLD AUTO: 11.2 FL (ref 6–12)
PO2 BLDA: 121 MM HG (ref 75–100)
PO2 TEMP ADJ BLD: ABNORMAL MM[HG]
POTASSIUM SERPL-SCNC: 4.3 MMOL/L (ref 3.5–5.2)
PROT SERPL-MCNC: 6.2 G/DL (ref 6–8.5)
RBC # BLD AUTO: 3.81 10*6/MM3 (ref 3.77–5.28)
SAO2 % BLDCOA: 99.1 % (ref 94–100)
SODIUM SERPL-SCNC: 145 MMOL/L (ref 136–145)
VENTILATOR MODE: ABNORMAL
WBC NRBC COR # BLD AUTO: 14.94 10*3/MM3 (ref 3.4–10.8)

## 2025-08-12 PROCEDURE — 82375 ASSAY CARBOXYHB QUANT: CPT

## 2025-08-12 PROCEDURE — 25010000002 METHYLPREDNISOLONE PER 40 MG: Performed by: INTERNAL MEDICINE

## 2025-08-12 PROCEDURE — 25010000002 METHYLPREDNISOLONE PER 125 MG: Performed by: INTERNAL MEDICINE

## 2025-08-12 PROCEDURE — 94799 UNLISTED PULMONARY SVC/PX: CPT

## 2025-08-12 PROCEDURE — 97161 PT EVAL LOW COMPLEX 20 MIN: CPT

## 2025-08-12 PROCEDURE — 82805 BLOOD GASES W/O2 SATURATION: CPT

## 2025-08-12 PROCEDURE — 83050 HGB METHEMOGLOBIN QUAN: CPT

## 2025-08-12 PROCEDURE — 97165 OT EVAL LOW COMPLEX 30 MIN: CPT

## 2025-08-12 PROCEDURE — 80053 COMPREHEN METABOLIC PANEL: CPT | Performed by: INTERNAL MEDICINE

## 2025-08-12 PROCEDURE — 36600 WITHDRAWAL OF ARTERIAL BLOOD: CPT

## 2025-08-12 PROCEDURE — 99232 SBSQ HOSP IP/OBS MODERATE 35: CPT | Performed by: INTERNAL MEDICINE

## 2025-08-12 PROCEDURE — 85027 COMPLETE CBC AUTOMATED: CPT | Performed by: INTERNAL MEDICINE

## 2025-08-12 RX ORDER — OXYCODONE AND ACETAMINOPHEN 5; 325 MG/1; MG/1
1 TABLET ORAL EVERY 6 HOURS PRN
Refills: 0 | Status: DISCONTINUED | OUTPATIENT
Start: 2025-08-12 | End: 2025-08-12

## 2025-08-12 RX ORDER — QUETIAPINE FUMARATE 25 MG/1
12.5 TABLET, FILM COATED ORAL ONCE
Status: COMPLETED | OUTPATIENT
Start: 2025-08-12 | End: 2025-08-12

## 2025-08-12 RX ORDER — METHYLPREDNISOLONE SODIUM SUCCINATE 125 MG/2ML
60 INJECTION, POWDER, LYOPHILIZED, FOR SOLUTION INTRAMUSCULAR; INTRAVENOUS EVERY 8 HOURS
Status: DISCONTINUED | OUTPATIENT
Start: 2025-08-12 | End: 2025-08-13

## 2025-08-12 RX ORDER — OXYCODONE AND ACETAMINOPHEN 7.5; 325 MG/1; MG/1
1 TABLET ORAL EVERY 4 HOURS PRN
Refills: 0 | Status: DISCONTINUED | OUTPATIENT
Start: 2025-08-12 | End: 2025-08-14 | Stop reason: HOSPADM

## 2025-08-12 RX ADMIN — QUETIAPINE FUMARATE 12.5 MG: 25 TABLET ORAL at 00:46

## 2025-08-12 RX ADMIN — APIXABAN 5 MG: 5 TABLET, FILM COATED ORAL at 09:42

## 2025-08-12 RX ADMIN — ATORVASTATIN CALCIUM 40 MG: 40 TABLET, FILM COATED ORAL at 09:41

## 2025-08-12 RX ADMIN — ACETAMINOPHEN 650 MG: 325 TABLET ORAL at 14:33

## 2025-08-12 RX ADMIN — Medication 10 ML: at 21:05

## 2025-08-12 RX ADMIN — IPRATROPIUM BROMIDE AND ALBUTEROL SULFATE 3 ML: 2.5; .5 SOLUTION RESPIRATORY (INHALATION) at 12:10

## 2025-08-12 RX ADMIN — AZITHROMYCIN DIHYDRATE 250 MG: 250 TABLET ORAL at 09:42

## 2025-08-12 RX ADMIN — IPRATROPIUM BROMIDE AND ALBUTEROL SULFATE 3 ML: 2.5; .5 SOLUTION RESPIRATORY (INHALATION) at 19:01

## 2025-08-12 RX ADMIN — CLONAZEPAM 0.5 MG: 0.5 TABLET ORAL at 21:04

## 2025-08-12 RX ADMIN — DULOXETINE 60 MG: 30 CAPSULE, DELAYED RELEASE ORAL at 21:04

## 2025-08-12 RX ADMIN — AMIODARONE HYDROCHLORIDE 200 MG: 200 TABLET ORAL at 09:42

## 2025-08-12 RX ADMIN — PANTOPRAZOLE SODIUM 40 MG: 40 TABLET, DELAYED RELEASE ORAL at 09:42

## 2025-08-12 RX ADMIN — DULOXETINE 60 MG: 30 CAPSULE, DELAYED RELEASE ORAL at 09:41

## 2025-08-12 RX ADMIN — OXYCODONE HYDROCHLORIDE AND ACETAMINOPHEN 1 TABLET: 5; 325 TABLET ORAL at 11:58

## 2025-08-12 RX ADMIN — METOPROLOL SUCCINATE 50 MG: 25 TABLET, EXTENDED RELEASE ORAL at 09:41

## 2025-08-12 RX ADMIN — CLONAZEPAM 0.5 MG: 0.5 TABLET ORAL at 06:28

## 2025-08-12 RX ADMIN — METHYLPREDNISOLONE SODIUM SUCCINATE 60 MG: 125 INJECTION INTRAMUSCULAR; INTRAVENOUS at 17:55

## 2025-08-12 RX ADMIN — Medication 10 ML: at 09:42

## 2025-08-12 RX ADMIN — APIXABAN 5 MG: 5 TABLET, FILM COATED ORAL at 21:04

## 2025-08-12 RX ADMIN — METHYLPREDNISOLONE SODIUM SUCCINATE 40 MG: 40 INJECTION, POWDER, FOR SOLUTION INTRAMUSCULAR; INTRAVENOUS at 09:42

## 2025-08-12 RX ADMIN — LOSARTAN POTASSIUM 25 MG: 25 TABLET, FILM COATED ORAL at 09:42

## 2025-08-13 LAB
ANION GAP SERPL CALCULATED.3IONS-SCNC: 7.1 MMOL/L (ref 5–15)
BUN SERPL-MCNC: 28 MG/DL (ref 8–23)
BUN/CREAT SERPL: 53.8 (ref 7–25)
CALCIUM SPEC-SCNC: 9 MG/DL (ref 8.6–10.5)
CHLORIDE SERPL-SCNC: 99 MMOL/L (ref 98–107)
CO2 SERPL-SCNC: 35.9 MMOL/L (ref 22–29)
CREAT SERPL-MCNC: 0.52 MG/DL (ref 0.57–1)
DEPRECATED RDW RBC AUTO: 49.4 FL (ref 37–54)
EGFRCR SERPLBLD CKD-EPI 2021: 95.2 ML/MIN/1.73
ERYTHROCYTE [DISTWIDTH] IN BLOOD BY AUTOMATED COUNT: 15.5 % (ref 12.3–15.4)
GLUCOSE SERPL-MCNC: 149 MG/DL (ref 65–99)
HCT VFR BLD AUTO: 30.7 % (ref 34–46.6)
HGB BLD-MCNC: 8.8 G/DL (ref 12–15.9)
MCH RBC QN AUTO: 25.2 PG (ref 26.6–33)
MCHC RBC AUTO-ENTMCNC: 28.7 G/DL (ref 31.5–35.7)
MCV RBC AUTO: 88 FL (ref 79–97)
PLATELET # BLD AUTO: 183 10*3/MM3 (ref 140–450)
PMV BLD AUTO: 10.9 FL (ref 6–12)
POTASSIUM SERPL-SCNC: 4.5 MMOL/L (ref 3.5–5.2)
RBC # BLD AUTO: 3.49 10*6/MM3 (ref 3.77–5.28)
SODIUM SERPL-SCNC: 142 MMOL/L (ref 136–145)
WBC NRBC COR # BLD AUTO: 8.65 10*3/MM3 (ref 3.4–10.8)

## 2025-08-13 PROCEDURE — 25010000002 METHYLPREDNISOLONE PER 125 MG: Performed by: INTERNAL MEDICINE

## 2025-08-13 PROCEDURE — 94799 UNLISTED PULMONARY SVC/PX: CPT

## 2025-08-13 PROCEDURE — 94761 N-INVAS EAR/PLS OXIMETRY MLT: CPT

## 2025-08-13 PROCEDURE — 97116 GAIT TRAINING THERAPY: CPT

## 2025-08-13 PROCEDURE — 99232 SBSQ HOSP IP/OBS MODERATE 35: CPT | Performed by: INTERNAL MEDICINE

## 2025-08-13 PROCEDURE — 25010000002 CEFTRIAXONE PER 250 MG: Performed by: INTERNAL MEDICINE

## 2025-08-13 PROCEDURE — 80048 BASIC METABOLIC PNL TOTAL CA: CPT | Performed by: INTERNAL MEDICINE

## 2025-08-13 PROCEDURE — 85027 COMPLETE CBC AUTOMATED: CPT | Performed by: INTERNAL MEDICINE

## 2025-08-13 RX ORDER — METHYLPREDNISOLONE SODIUM SUCCINATE 125 MG/2ML
60 INJECTION, POWDER, LYOPHILIZED, FOR SOLUTION INTRAMUSCULAR; INTRAVENOUS EVERY 12 HOURS
Status: DISCONTINUED | OUTPATIENT
Start: 2025-08-13 | End: 2025-08-14 | Stop reason: HOSPADM

## 2025-08-13 RX ORDER — LOSARTAN POTASSIUM 50 MG/1
50 TABLET ORAL DAILY
Status: DISCONTINUED | OUTPATIENT
Start: 2025-08-13 | End: 2025-08-14 | Stop reason: HOSPADM

## 2025-08-13 RX ADMIN — APIXABAN 5 MG: 5 TABLET, FILM COATED ORAL at 20:15

## 2025-08-13 RX ADMIN — CEFTRIAXONE SODIUM 1000 MG: 1 INJECTION, POWDER, FOR SOLUTION INTRAMUSCULAR; INTRAVENOUS at 00:05

## 2025-08-13 RX ADMIN — CLONAZEPAM 0.5 MG: 0.5 TABLET ORAL at 21:54

## 2025-08-13 RX ADMIN — Medication 10 ML: at 09:02

## 2025-08-13 RX ADMIN — LOSARTAN POTASSIUM 50 MG: 50 TABLET, FILM COATED ORAL at 09:02

## 2025-08-13 RX ADMIN — DULOXETINE 60 MG: 30 CAPSULE, DELAYED RELEASE ORAL at 20:15

## 2025-08-13 RX ADMIN — OXYCODONE HYDROCHLORIDE AND ACETAMINOPHEN 1 TABLET: 7.5; 325 TABLET ORAL at 19:25

## 2025-08-13 RX ADMIN — METHYLPREDNISOLONE SODIUM SUCCINATE 60 MG: 125 INJECTION INTRAMUSCULAR; INTRAVENOUS at 00:05

## 2025-08-13 RX ADMIN — DULOXETINE 60 MG: 30 CAPSULE, DELAYED RELEASE ORAL at 09:01

## 2025-08-13 RX ADMIN — ATORVASTATIN CALCIUM 40 MG: 40 TABLET, FILM COATED ORAL at 09:01

## 2025-08-13 RX ADMIN — OXYCODONE HYDROCHLORIDE AND ACETAMINOPHEN 1 TABLET: 7.5; 325 TABLET ORAL at 02:16

## 2025-08-13 RX ADMIN — IPRATROPIUM BROMIDE AND ALBUTEROL SULFATE 3 ML: 2.5; .5 SOLUTION RESPIRATORY (INHALATION) at 19:19

## 2025-08-13 RX ADMIN — OXYCODONE HYDROCHLORIDE AND ACETAMINOPHEN 1 TABLET: 7.5; 325 TABLET ORAL at 12:43

## 2025-08-13 RX ADMIN — METHYLPREDNISOLONE SODIUM SUCCINATE 60 MG: 125 INJECTION, POWDER, FOR SOLUTION INTRAMUSCULAR; INTRAVENOUS at 12:16

## 2025-08-13 RX ADMIN — METOPROLOL SUCCINATE 50 MG: 25 TABLET, EXTENDED RELEASE ORAL at 09:01

## 2025-08-13 RX ADMIN — Medication 10 ML: at 20:15

## 2025-08-13 RX ADMIN — PANTOPRAZOLE SODIUM 40 MG: 40 TABLET, DELAYED RELEASE ORAL at 09:02

## 2025-08-13 RX ADMIN — ACETAMINOPHEN 650 MG: 325 TABLET ORAL at 06:28

## 2025-08-13 RX ADMIN — APIXABAN 5 MG: 5 TABLET, FILM COATED ORAL at 09:02

## 2025-08-13 RX ADMIN — AMIODARONE HYDROCHLORIDE 200 MG: 200 TABLET ORAL at 09:01

## 2025-08-13 RX ADMIN — IPRATROPIUM BROMIDE AND ALBUTEROL SULFATE 3 ML: 2.5; .5 SOLUTION RESPIRATORY (INHALATION) at 07:19

## 2025-08-13 RX ADMIN — CLONAZEPAM 0.5 MG: 0.5 TABLET ORAL at 09:01

## 2025-08-14 ENCOUNTER — READMISSION MANAGEMENT (OUTPATIENT)
Dept: CALL CENTER | Facility: HOSPITAL | Age: 80
End: 2025-08-14
Payer: MEDICARE

## 2025-08-14 VITALS
OXYGEN SATURATION: 94 % | TEMPERATURE: 97.9 F | RESPIRATION RATE: 18 BRPM | WEIGHT: 139.11 LBS | BODY MASS INDEX: 23.18 KG/M2 | DIASTOLIC BLOOD PRESSURE: 55 MMHG | HEART RATE: 97 BPM | HEIGHT: 65 IN | SYSTOLIC BLOOD PRESSURE: 157 MMHG

## 2025-08-14 DIAGNOSIS — I48.0 PAROXYSMAL ATRIAL FIBRILLATION: ICD-10-CM

## 2025-08-14 PROBLEM — J96.02 ACUTE RESPIRATORY FAILURE WITH HYPOXIA AND HYPERCAPNIA: Status: RESOLVED | Noted: 2023-05-22 | Resolved: 2025-08-14

## 2025-08-14 PROBLEM — J96.90 RESPIRATORY FAILURE: Status: RESOLVED | Noted: 2025-08-11 | Resolved: 2025-08-14

## 2025-08-14 PROBLEM — J96.01 ACUTE RESPIRATORY FAILURE WITH HYPOXIA AND HYPERCAPNIA: Status: RESOLVED | Noted: 2023-05-22 | Resolved: 2025-08-14

## 2025-08-14 LAB
ANION GAP SERPL CALCULATED.3IONS-SCNC: 6.6 MMOL/L (ref 5–15)
BUN SERPL-MCNC: 29 MG/DL (ref 8–23)
BUN/CREAT SERPL: 52.7 (ref 7–25)
CALCIUM SPEC-SCNC: 8.6 MG/DL (ref 8.6–10.5)
CHLORIDE SERPL-SCNC: 101 MMOL/L (ref 98–107)
CO2 SERPL-SCNC: 34.4 MMOL/L (ref 22–29)
CREAT SERPL-MCNC: 0.55 MG/DL (ref 0.57–1)
DEPRECATED RDW RBC AUTO: 49.3 FL (ref 37–54)
EGFRCR SERPLBLD CKD-EPI 2021: 94 ML/MIN/1.73
ERYTHROCYTE [DISTWIDTH] IN BLOOD BY AUTOMATED COUNT: 15.5 % (ref 12.3–15.4)
GLUCOSE SERPL-MCNC: 134 MG/DL (ref 65–99)
HCT VFR BLD AUTO: 31.4 % (ref 34–46.6)
HGB BLD-MCNC: 9 G/DL (ref 12–15.9)
MCH RBC QN AUTO: 25.1 PG (ref 26.6–33)
MCHC RBC AUTO-ENTMCNC: 28.7 G/DL (ref 31.5–35.7)
MCV RBC AUTO: 87.7 FL (ref 79–97)
PLATELET # BLD AUTO: 210 10*3/MM3 (ref 140–450)
PMV BLD AUTO: 10.8 FL (ref 6–12)
POTASSIUM SERPL-SCNC: 4.5 MMOL/L (ref 3.5–5.2)
RBC # BLD AUTO: 3.58 10*6/MM3 (ref 3.77–5.28)
SODIUM SERPL-SCNC: 142 MMOL/L (ref 136–145)
WBC NRBC COR # BLD AUTO: 11.92 10*3/MM3 (ref 3.4–10.8)

## 2025-08-14 PROCEDURE — 85027 COMPLETE CBC AUTOMATED: CPT | Performed by: INTERNAL MEDICINE

## 2025-08-14 PROCEDURE — 25010000002 METHYLPREDNISOLONE PER 125 MG: Performed by: INTERNAL MEDICINE

## 2025-08-14 PROCEDURE — 94664 DEMO&/EVAL PT USE INHALER: CPT

## 2025-08-14 PROCEDURE — 94761 N-INVAS EAR/PLS OXIMETRY MLT: CPT

## 2025-08-14 PROCEDURE — 94660 CPAP INITIATION&MGMT: CPT

## 2025-08-14 PROCEDURE — 80048 BASIC METABOLIC PNL TOTAL CA: CPT | Performed by: INTERNAL MEDICINE

## 2025-08-14 PROCEDURE — 94799 UNLISTED PULMONARY SVC/PX: CPT

## 2025-08-14 PROCEDURE — 25010000002 CEFTRIAXONE PER 250 MG: Performed by: INTERNAL MEDICINE

## 2025-08-14 PROCEDURE — 99239 HOSP IP/OBS DSCHRG MGMT >30: CPT | Performed by: INTERNAL MEDICINE

## 2025-08-14 RX ORDER — LOSARTAN POTASSIUM 50 MG/1
50 TABLET ORAL DAILY
Qty: 30 TABLET | Refills: 0 | Status: SHIPPED | OUTPATIENT
Start: 2025-08-14 | End: 2025-09-13

## 2025-08-14 RX ORDER — PREDNISONE 10 MG/1
TABLET ORAL
Qty: 63 TABLET | Refills: 0 | Status: SHIPPED | OUTPATIENT
Start: 2025-08-14 | End: 2025-09-01

## 2025-08-14 RX ADMIN — PANTOPRAZOLE SODIUM 40 MG: 40 TABLET, DELAYED RELEASE ORAL at 08:55

## 2025-08-14 RX ADMIN — DULOXETINE 60 MG: 30 CAPSULE, DELAYED RELEASE ORAL at 08:54

## 2025-08-14 RX ADMIN — IPRATROPIUM BROMIDE AND ALBUTEROL SULFATE 3 ML: 2.5; .5 SOLUTION RESPIRATORY (INHALATION) at 07:07

## 2025-08-14 RX ADMIN — CLONAZEPAM 0.5 MG: 0.5 TABLET ORAL at 11:23

## 2025-08-14 RX ADMIN — Medication 10 ML: at 08:57

## 2025-08-14 RX ADMIN — OXYCODONE HYDROCHLORIDE AND ACETAMINOPHEN 1 TABLET: 7.5; 325 TABLET ORAL at 08:54

## 2025-08-14 RX ADMIN — CEFTRIAXONE SODIUM 1000 MG: 1 INJECTION, POWDER, FOR SOLUTION INTRAMUSCULAR; INTRAVENOUS at 00:31

## 2025-08-14 RX ADMIN — METHYLPREDNISOLONE SODIUM SUCCINATE 60 MG: 125 INJECTION, POWDER, FOR SOLUTION INTRAMUSCULAR; INTRAVENOUS at 00:31

## 2025-08-14 RX ADMIN — OXYCODONE HYDROCHLORIDE AND ACETAMINOPHEN 1 TABLET: 7.5; 325 TABLET ORAL at 02:33

## 2025-08-14 RX ADMIN — LOSARTAN POTASSIUM 50 MG: 50 TABLET, FILM COATED ORAL at 08:54

## 2025-08-14 RX ADMIN — METOPROLOL SUCCINATE 50 MG: 25 TABLET, EXTENDED RELEASE ORAL at 08:54

## 2025-08-14 RX ADMIN — APIXABAN 5 MG: 5 TABLET, FILM COATED ORAL at 08:55

## 2025-08-14 RX ADMIN — ATORVASTATIN CALCIUM 40 MG: 40 TABLET, FILM COATED ORAL at 08:55

## 2025-08-14 RX ADMIN — AMIODARONE HYDROCHLORIDE 200 MG: 200 TABLET ORAL at 08:55

## 2025-08-15 ENCOUNTER — TRANSITIONAL CARE MANAGEMENT TELEPHONE ENCOUNTER (OUTPATIENT)
Dept: CALL CENTER | Facility: HOSPITAL | Age: 80
End: 2025-08-15
Payer: MEDICARE

## 2025-08-16 LAB
BACTERIA SPEC AEROBE CULT: NORMAL
BACTERIA SPEC AEROBE CULT: NORMAL

## 2025-08-18 ENCOUNTER — TELEPHONE (OUTPATIENT)
Dept: INTERNAL MEDICINE | Facility: CLINIC | Age: 80
End: 2025-08-18
Payer: MEDICARE

## 2025-08-21 ENCOUNTER — OFFICE VISIT (OUTPATIENT)
Dept: INTERNAL MEDICINE | Facility: CLINIC | Age: 80
End: 2025-08-21
Payer: MEDICARE

## 2025-08-21 VITALS
WEIGHT: 137.8 LBS | HEIGHT: 65 IN | TEMPERATURE: 97.8 F | RESPIRATION RATE: 20 BRPM | OXYGEN SATURATION: 97 % | SYSTOLIC BLOOD PRESSURE: 120 MMHG | DIASTOLIC BLOOD PRESSURE: 60 MMHG | BODY MASS INDEX: 22.96 KG/M2 | HEART RATE: 75 BPM

## 2025-08-21 DIAGNOSIS — K12.1 ORAL ULCER: ICD-10-CM

## 2025-08-21 DIAGNOSIS — Z09 HOSPITAL DISCHARGE FOLLOW-UP: ICD-10-CM

## 2025-08-21 DIAGNOSIS — J44.9 END STAGE COPD: ICD-10-CM

## 2025-08-21 DIAGNOSIS — B02.9 HERPES ZOSTER WITHOUT COMPLICATION: Primary | ICD-10-CM

## 2025-08-21 RX ORDER — ACYCLOVIR 400 MG/1
400 TABLET ORAL
Qty: 50 TABLET | Refills: 0 | Status: SHIPPED | OUTPATIENT
Start: 2025-08-21 | End: 2025-08-31

## 2025-08-21 RX ORDER — PHENOL 0.6 %
1 LIQUID (ML) MUCOUS MEMBRANE
Qty: 178 ML | Refills: 1 | Status: SHIPPED | OUTPATIENT
Start: 2025-08-21

## 2025-08-25 ENCOUNTER — READMISSION MANAGEMENT (OUTPATIENT)
Dept: CALL CENTER | Facility: HOSPITAL | Age: 80
End: 2025-08-25
Payer: MEDICARE

## 2025-08-29 ENCOUNTER — READMISSION MANAGEMENT (OUTPATIENT)
Dept: CALL CENTER | Facility: HOSPITAL | Age: 80
End: 2025-08-29
Payer: MEDICARE

## (undated) DEVICE — SUT PROLN 0 CT 30IN 8434H

## (undated) DEVICE — GLV SURG SENSICARE W/ALOE PF LF 6.5 STRL

## (undated) DEVICE — PROXIMATE SKIN STAPLERS (35 WIDE) CONTAINS 35 STAINLESS STEEL STAPLES (FIXED HEAD): Brand: PROXIMATE

## (undated) DEVICE — SUT SILK 0 TIES 30IN A306H

## (undated) DEVICE — RICH MAJOR PROCEDURE: Brand: MEDLINE INDUSTRIES, INC.

## (undated) DEVICE — TP ELECTRD LAP L WR SPLIT33CM

## (undated) DEVICE — KT CATH CHOLANGIOGRA PERC W/BALLO

## (undated) DEVICE — SUT SILK 2/0 SUTUPAK TIES 24IN SA75H

## (undated) DEVICE — PAD GRND REM POLYHESIVE A/ DISP

## (undated) DEVICE — TOWEL,OR,DSP,ST,BLUE,STD,4/PK,20PK/CS: Brand: MEDLINE

## (undated) DEVICE — Device

## (undated) DEVICE — GLV SURG SENSICARE POLYISPRN W/ALOE PF LF 6.5 GRN STRL

## (undated) DEVICE — HYBRID TUBING/CAP SET FOR OLYMPUS® SCOPES: Brand: ERBE

## (undated) DEVICE — BLD CLIP UNIV SURG GRY

## (undated) DEVICE — ELECTRD BLD EZ CLN STD 6.5IN

## (undated) DEVICE — QUICK CATCH IN-LINE SUCTION POLYP TRAP IS USED FOR SUCTION RETRIEVAL OF ENDOSCOPICALLY REMOVED POLYPS.

## (undated) DEVICE — SUT SILK 3/0 SH CR8 18IN C013D

## (undated) DEVICE — SNAR POLYP SENSATION STDOVL 27 240 BX40

## (undated) DEVICE — UNDYED BRAIDED (POLYGLACTIN 910), SYNTHETIC ABSORBABLE SUTURE: Brand: COATED VICRYL

## (undated) DEVICE — GLV SURG TRIUMPH MICRO PF LTX 7.5 STRL

## (undated) DEVICE — SOL NACL 0.9PCT 1000ML

## (undated) DEVICE — DISPOSABLE MONOPOLAR ENDOSCOPIC CORD 10 FT. (3M): Brand: KIRWAN

## (undated) DEVICE — 3M™ STERI-STRIP™ REINFORCED ADHESIVE SKIN CLOSURES, R1548, 1 IN X 5 IN (25 MM X 125 MM), 4 STRIPS/ENVELOPE: Brand: 3M™ STERI-STRIP™

## (undated) DEVICE — ENDOSCOPY PORT CONNECTOR FOR OLYMPUS® SCOPES: Brand: ERBE

## (undated) DEVICE — TOTAL TRAY, 16FR 10ML SIL FOLEY, URN: Brand: MEDLINE

## (undated) DEVICE — DRSNG WND GZ PAD BORDERED LF 4X5IN STRL

## (undated) DEVICE — RICH GENERAL LAPAROSCOPY: Brand: MEDLINE INDUSTRIES, INC.

## (undated) DEVICE — TOWEL,OR,DSP,ST,WHITE,DLX,XR,2/PK,40PK/C: Brand: MEDLINE

## (undated) DEVICE — SLV SCD CALF HEMOFORCE DVT THERP REPROC MD

## (undated) DEVICE — SUT ETHLN 3/0 KS 30IN 627H

## (undated) DEVICE — Device: Brand: DEFENDO AIR/WATER/SUCTION AND BIOPSY VALVE

## (undated) DEVICE — CLAVICLE STRAP: Brand: DEROYAL

## (undated) DEVICE — PROVIDES A STERILE INTERFACE BETWEEN THE OPERATING ROOM SURGICAL LAMPS (NON-STERILE) AND THE SURGEON OR NURSE (STERILE).: Brand: STERION®CLAMP COVER FABRIC

## (undated) DEVICE — 2, DISPOSABLE SUCTION/IRRIGATOR WITHOUT DISPOSABLE TIP: Brand: STRYKEFLOW

## (undated) DEVICE — DRSNG WND GZ PAD BORDERED 4X8IN STRL

## (undated) DEVICE — SPNG LAP 18X18IN LF STRL PK/5

## (undated) DEVICE — SILICONE CLAMP COVERS, STERILE, BLUE, 0.29" (7.40MM) X 5", 2/PKG: Brand: KEY SURGICAL SILICONE CLAMP COVERS

## (undated) DEVICE — SUT PROLN 0/0 CTX 30IN 8454H

## (undated) DEVICE — ENDOPATH XCEL UNIVERSAL TROCAR STABLILITY SLEEVES: Brand: ENDOPATH XCEL

## (undated) DEVICE — FRCP BIOP COLD ENDOJAW ALLGTR W/NDL 2.8X2300MM BLU

## (undated) DEVICE — ROYALSILK SURGICAL GOWN, L: Brand: CONVERTORS

## (undated) DEVICE — SUT VIC 0/0 UR6 27IN DYED J603H

## (undated) DEVICE — DRSNG WND BORDR/ADHS NONADHR/GZ LF 4X10IN STRL

## (undated) DEVICE — ENDOPATH XCEL BLADELESS TROCARS WITH STABILITY SLEEVES: Brand: ENDOPATH XCEL

## (undated) DEVICE — GLV SURG SENSICARE W/ALOE PF LF 7.5 STRL

## (undated) DEVICE — CUFF SCD HEMOFORCE SEQ CALF STD MD

## (undated) DEVICE — MONOPOLAR METZENBAUM SCISSOR, MINI BLADE TIP, DISPOSABLE: Brand: MONOPOLAR METZENBAUM SCISSOR, MINI BLADE TIP, DISPOSABLE

## (undated) DEVICE — TRY SKINPREP PVP SCRB W PAINT

## (undated) DEVICE — SUT PDS 1 TP1 48IN Z880G BX/12

## (undated) DEVICE — JELLY,LUBE,STERILE,FLIP TOP,TUBE,2-OZ: Brand: MEDLINE